# Patient Record
Sex: FEMALE | Race: BLACK OR AFRICAN AMERICAN | NOT HISPANIC OR LATINO
[De-identification: names, ages, dates, MRNs, and addresses within clinical notes are randomized per-mention and may not be internally consistent; named-entity substitution may affect disease eponyms.]

---

## 2017-04-04 ENCOUNTER — MEDICATION RENEWAL (OUTPATIENT)
Age: 62
End: 2017-04-04

## 2017-04-07 ENCOUNTER — EMERGENCY (EMERGENCY)
Facility: HOSPITAL | Age: 62
LOS: 1 days | Discharge: HOME | End: 2017-04-07
Attending: EMERGENCY MEDICINE | Admitting: EMERGENCY MEDICINE
Payer: COMMERCIAL

## 2017-04-07 VITALS
OXYGEN SATURATION: 100 % | DIASTOLIC BLOOD PRESSURE: 86 MMHG | HEART RATE: 100 BPM | SYSTOLIC BLOOD PRESSURE: 136 MMHG | RESPIRATION RATE: 20 BRPM

## 2017-04-07 PROCEDURE — 93010 ELECTROCARDIOGRAM REPORT: CPT | Mod: 59

## 2017-04-07 PROCEDURE — 99284 EMERGENCY DEPT VISIT MOD MDM: CPT | Mod: 25

## 2017-04-07 NOTE — ED PROVIDER NOTE - OBJECTIVE STATEMENT
60yo f pmh asthma, chf, DM, htn, thyroid cancer, presents s/p mechanical fall. Hit head. No LOC. Pt on ASA. Mild dizziness after the fall but able to ambulate. No nausea or vomiting. Pt now complaining of left head and shoulder pain.

## 2017-04-07 NOTE — ED ADULT NURSE NOTE - PMH
Asthma    CHF (congestive heart failure)  hypothyroid  DM (diabetes mellitus)    HTN (hypertension)    Thyroid ca

## 2017-04-07 NOTE — ED PROVIDER NOTE - ATTENDING CONTRIBUTION TO CARE
62 yo F past medical history of asthma, CHF, DM, Hypertension, thyroid cancer, presents s/p mechanical fall after slipping on Lysol on the floor. Hit head. No LOC, no N/V, no visual changes. Pt on daily ASA. Patient able to ambulate after fall. Patient states she struck her Left shoulder and chest wall.  JODEE LUCIANO, ATTENDING NOTE:  Patient is awake and alert and in no acute distress.  Normocephalic/atraumatic, No Romeo's sign, no Raccoon eyes..  Auricles are normal.  Neck supple.  Lungs CTAB, no wheeze, no rhonchi,  no rales.  Heart is regular rate and rhythm.  Abdomen is soft, not distended +BS.  Back is nontender, no CVAT.  Moving all 4 extremities.   Neurologically grossly intact.  Affect is appropriate.

## 2017-04-07 NOTE — ED PROVIDER NOTE - PROGRESS NOTE DETAILS
JODEE BUNCH MD: Patient received Tylenol for pain.  Will reassess for adequate pain control prior to discharge.

## 2017-04-07 NOTE — ED ADULT NURSE NOTE - OBJECTIVE STATEMENT
Terence RN: Patient received to room 18, Aox4 and ambulatory. States she was in her kitchen today when she slipped and fell and hit the left side of her head on the door frame then landed on her left shoulder on the floor. Denies being on anticoagulants. States hx of CHF. States she did not get dizzy. States she feels a little sleepy after hitting her head. Vitally stable, in no acute distress. denies any symptoms other than left sided head 6/10 pain scale pain radiating to her neck and left shoulder pain 6/10. Able to move shoulder. Oriented to person, place and time. Report given to Irma DOWNING.

## 2017-04-07 NOTE — ED PROVIDER NOTE - ENMT, MLM
Airway patent, Nasal mucosa clear. Mouth with normal mucosa. Throat has no vesicles, no oropharyngeal exudates and uvula is midline. Airway patent.  No stridor, no oropharyngeal edema.

## 2017-04-07 NOTE — ED ADULT TRIAGE NOTE - CHIEF COMPLAINT QUOTE
Patient states that she slipped and fell today in her kitchen, 4 :30 pm.  Hit her  head on the left side, denies any loc. Has left neck and shoulder pain, headache.

## 2017-04-08 VITALS
DIASTOLIC BLOOD PRESSURE: 80 MMHG | OXYGEN SATURATION: 98 % | RESPIRATION RATE: 15 BRPM | SYSTOLIC BLOOD PRESSURE: 110 MMHG | HEART RATE: 83 BPM

## 2017-04-08 PROCEDURE — 71020: CPT | Mod: 26

## 2017-04-08 PROCEDURE — 72125 CT NECK SPINE W/O DYE: CPT | Mod: 26

## 2017-04-08 PROCEDURE — 73030 X-RAY EXAM OF SHOULDER: CPT | Mod: 26,LT

## 2017-04-08 PROCEDURE — 70450 CT HEAD/BRAIN W/O DYE: CPT | Mod: 26

## 2017-04-08 RX ORDER — ACETAMINOPHEN 500 MG
650 TABLET ORAL ONCE
Qty: 0 | Refills: 0 | Status: COMPLETED | OUTPATIENT
Start: 2017-04-08 | End: 2017-04-08

## 2017-04-08 RX ADMIN — Medication 650 MILLIGRAM(S): at 01:17

## 2017-05-08 ENCOUNTER — EMERGENCY (EMERGENCY)
Facility: HOSPITAL | Age: 62
LOS: 1 days | Discharge: HOME | End: 2017-05-08
Attending: EMERGENCY MEDICINE | Admitting: EMERGENCY MEDICINE
Payer: COMMERCIAL

## 2017-05-08 VITALS
HEART RATE: 100 BPM | OXYGEN SATURATION: 100 % | TEMPERATURE: 98 F | DIASTOLIC BLOOD PRESSURE: 96 MMHG | SYSTOLIC BLOOD PRESSURE: 134 MMHG | RESPIRATION RATE: 20 BRPM

## 2017-05-08 VITALS
HEART RATE: 89 BPM | RESPIRATION RATE: 18 BRPM | TEMPERATURE: 98 F | HEIGHT: 67 IN | SYSTOLIC BLOOD PRESSURE: 117 MMHG | WEIGHT: 229.94 LBS | OXYGEN SATURATION: 99 % | DIASTOLIC BLOOD PRESSURE: 90 MMHG

## 2017-05-08 PROCEDURE — 99284 EMERGENCY DEPT VISIT MOD MDM: CPT | Mod: 25

## 2017-05-08 PROCEDURE — 93010 ELECTROCARDIOGRAM REPORT: CPT

## 2017-05-08 NOTE — ED ADULT TRIAGE NOTE - CHIEF COMPLAINT QUOTE
Pt c/o abd pain with SOB x1 week.  Hx CHF.  No pedal edema, vomiting, or diarrhea.  Appears comfortable.  Able to complete sentences

## 2017-05-08 NOTE — ED ADULT NURSE NOTE - OBJECTIVE STATEMENT
A&Ox3, respirations even and unlabored, lung sounds clear on auscultation, skin warm and dry good for color, skin intact, speaks in clear and full sentences, moves all extremities, ambulatory at baseline, no lower extremity swelling, + capillary refill, + pedal pulses. As per patient had abdominal pain with SOB x 8 days, states she feels "a bubbling feeling in my stomach and I feel short of breathe", loose stool last BM 8pm today. Denies chest pain, LOC, fevers, chills, dysuria, hematuria. Recent fall was April due to slip and fall and hit head, seen in [facility] ED for fall. HX of thyroidectomy s/p thyroid CA, CHF, borderline DM, cardiac arrest in 12/2013.

## 2017-05-09 LAB
ALBUMIN SERPL ELPH-MCNC: 4.3 G/DL — SIGNIFICANT CHANGE UP (ref 3.3–5)
ALP SERPL-CCNC: 160 U/L — HIGH (ref 40–120)
ALT FLD-CCNC: 118 U/L — HIGH (ref 4–33)
AST SERPL-CCNC: 74 U/L — HIGH (ref 4–32)
BASOPHILS # BLD AUTO: 0.05 K/UL — SIGNIFICANT CHANGE UP (ref 0–0.2)
BASOPHILS NFR BLD AUTO: 0.6 % — SIGNIFICANT CHANGE UP (ref 0–2)
BILIRUB SERPL-MCNC: 0.5 MG/DL — SIGNIFICANT CHANGE UP (ref 0.2–1.2)
BUN SERPL-MCNC: 19 MG/DL — SIGNIFICANT CHANGE UP (ref 7–23)
CALCIUM SERPL-MCNC: 6.6 MG/DL — LOW (ref 8.4–10.5)
CHLORIDE SERPL-SCNC: 95 MMOL/L — LOW (ref 98–107)
CO2 SERPL-SCNC: 27 MMOL/L — SIGNIFICANT CHANGE UP (ref 22–31)
CREAT SERPL-MCNC: 1.18 MG/DL — SIGNIFICANT CHANGE UP (ref 0.5–1.3)
EOSINOPHIL # BLD AUTO: 0.14 K/UL — SIGNIFICANT CHANGE UP (ref 0–0.5)
EOSINOPHIL NFR BLD AUTO: 1.6 % — SIGNIFICANT CHANGE UP (ref 0–6)
GLUCOSE SERPL-MCNC: 200 MG/DL — HIGH (ref 70–99)
HCT VFR BLD CALC: 38.6 % — SIGNIFICANT CHANGE UP (ref 34.5–45)
HGB BLD-MCNC: 12.5 G/DL — SIGNIFICANT CHANGE UP (ref 11.5–15.5)
IMM GRANULOCYTES NFR BLD AUTO: 0.3 % — SIGNIFICANT CHANGE UP (ref 0–1.5)
LYMPHOCYTES # BLD AUTO: 2.59 K/UL — SIGNIFICANT CHANGE UP (ref 1–3.3)
LYMPHOCYTES # BLD AUTO: 29.6 % — SIGNIFICANT CHANGE UP (ref 13–44)
MCHC RBC-ENTMCNC: 25.6 PG — LOW (ref 27–34)
MCHC RBC-ENTMCNC: 32.4 % — SIGNIFICANT CHANGE UP (ref 32–36)
MCV RBC AUTO: 79.1 FL — LOW (ref 80–100)
MONOCYTES # BLD AUTO: 0.62 K/UL — SIGNIFICANT CHANGE UP (ref 0–0.9)
MONOCYTES NFR BLD AUTO: 7.1 % — SIGNIFICANT CHANGE UP (ref 2–14)
NEUTROPHILS # BLD AUTO: 5.33 K/UL — SIGNIFICANT CHANGE UP (ref 1.8–7.4)
NEUTROPHILS NFR BLD AUTO: 60.8 % — SIGNIFICANT CHANGE UP (ref 43–77)
PLATELET # BLD AUTO: 279 K/UL — SIGNIFICANT CHANGE UP (ref 150–400)
PMV BLD: 11.5 FL — SIGNIFICANT CHANGE UP (ref 7–13)
POTASSIUM SERPL-MCNC: 4.3 MMOL/L — SIGNIFICANT CHANGE UP (ref 3.5–5.3)
POTASSIUM SERPL-SCNC: 4.3 MMOL/L — SIGNIFICANT CHANGE UP (ref 3.5–5.3)
PROT SERPL-MCNC: 7.8 G/DL — SIGNIFICANT CHANGE UP (ref 6–8.3)
RBC # BLD: 4.88 M/UL — SIGNIFICANT CHANGE UP (ref 3.8–5.2)
RBC # FLD: 16.3 % — HIGH (ref 10.3–14.5)
SODIUM SERPL-SCNC: 138 MMOL/L — SIGNIFICANT CHANGE UP (ref 135–145)
WBC # BLD: 8.76 K/UL — SIGNIFICANT CHANGE UP (ref 3.8–10.5)
WBC # FLD AUTO: 8.76 K/UL — SIGNIFICANT CHANGE UP (ref 3.8–10.5)

## 2017-05-09 RX ORDER — METOCLOPRAMIDE HCL 10 MG
10 TABLET ORAL ONCE
Qty: 0 | Refills: 0 | Status: COMPLETED | OUTPATIENT
Start: 2017-05-09 | End: 2017-05-09

## 2017-05-09 RX ORDER — METOCLOPRAMIDE HCL 10 MG
1 TABLET ORAL
Qty: 15 | Refills: 0 | OUTPATIENT
Start: 2017-05-09

## 2017-05-09 RX ADMIN — Medication 10 MILLIGRAM(S): at 02:05

## 2017-05-09 RX ADMIN — Medication 20 MILLIGRAM(S): at 00:53

## 2017-05-09 NOTE — ED PROVIDER NOTE - ATTENDING CONTRIBUTION TO CARE
pt presenting with a vague complaint of abdominal "bubbling" that at times radiates into her chest.  When she belches the symptoms resolve.  Has a history of CHF and DM.  Reports decrease in appetite recently as well as nausea.  Abd exam is benign and lungs are clear.  Is not consistent with ADHF or cardiac in etiology.  likely GI related along the lines of a gastroparesis.  Will treat symptomatically while ensuring normal lytes and HgB

## 2017-05-09 NOTE — ED PROVIDER NOTE - OBJECTIVE STATEMENT
60yo f pmh asthma, chf, DM, htn, thyroid cancer, p/w nn, stomach pain, sob x  1.5 weeks; last bm yesterday; no vomiting; has had  in past, no other abd surgeries; + sob when lies flat, + mild pedal edema; takes toresmide 20mg  and 10mg every other day;  no cp/ + lightheadedness intermittently for 1.5 weeks; + exerctional dyspnea, no recent stress test; sugars in 160s-    dr. tan On license of [facility];' 62yo f pmh asthma, chf, DM, htn, thyroid cancer, p/w nn, stomach pain, sob x  1.5 weeks; last bm yesterday; no vomiting; has had  in past, no other abd surgeries; + sob when lies flat, however at baseline, + mild pedal edema; takes toresmide 20mg  and 10mg every other day;  no cp/ + lightheadedness intermittently for 1.5 weeks; + exerctional dyspnea when belly bloating is present, no recent stress test; sugars in 160s-    dr. [facility];'

## 2017-05-13 ENCOUNTER — INPATIENT (INPATIENT)
Facility: HOSPITAL | Age: 62
LOS: 4 days | Discharge: HOME | End: 2017-05-18
Attending: INTERNAL MEDICINE | Admitting: INTERNAL MEDICINE
Payer: COMMERCIAL

## 2017-05-13 VITALS
SYSTOLIC BLOOD PRESSURE: 133 MMHG | DIASTOLIC BLOOD PRESSURE: 85 MMHG | RESPIRATION RATE: 18 BRPM | HEART RATE: 89 BPM | TEMPERATURE: 98 F | OXYGEN SATURATION: 99 %

## 2017-05-13 DIAGNOSIS — I50.22 CHRONIC SYSTOLIC (CONGESTIVE) HEART FAILURE: ICD-10-CM

## 2017-05-13 DIAGNOSIS — R10.84 GENERALIZED ABDOMINAL PAIN: ICD-10-CM

## 2017-05-13 DIAGNOSIS — I10 ESSENTIAL (PRIMARY) HYPERTENSION: ICD-10-CM

## 2017-05-13 DIAGNOSIS — R11.0 NAUSEA: ICD-10-CM

## 2017-05-13 DIAGNOSIS — K80.20 CALCULUS OF GALLBLADDER WITHOUT CHOLECYSTITIS WITHOUT OBSTRUCTION: ICD-10-CM

## 2017-05-13 DIAGNOSIS — R10.11 RIGHT UPPER QUADRANT PAIN: ICD-10-CM

## 2017-05-13 DIAGNOSIS — E11.9 TYPE 2 DIABETES MELLITUS WITHOUT COMPLICATIONS: ICD-10-CM

## 2017-05-13 DIAGNOSIS — E87.1 HYPO-OSMOLALITY AND HYPONATREMIA: ICD-10-CM

## 2017-05-13 LAB
ALBUMIN SERPL ELPH-MCNC: 4 G/DL — SIGNIFICANT CHANGE UP (ref 3.3–5)
ALP SERPL-CCNC: 181 U/L — HIGH (ref 40–120)
ALT FLD-CCNC: 137 U/L — HIGH (ref 4–33)
AST SERPL-CCNC: 124 U/L — HIGH (ref 4–32)
BASOPHILS # BLD AUTO: 0.03 K/UL — SIGNIFICANT CHANGE UP (ref 0–0.2)
BASOPHILS NFR BLD AUTO: 0.3 % — SIGNIFICANT CHANGE UP (ref 0–2)
BILIRUB SERPL-MCNC: 1.3 MG/DL — HIGH (ref 0.2–1.2)
BUN SERPL-MCNC: 17 MG/DL — SIGNIFICANT CHANGE UP (ref 7–23)
CALCIUM SERPL-MCNC: 6.3 MG/DL — CRITICAL LOW (ref 8.4–10.5)
CHLORIDE SERPL-SCNC: 90 MMOL/L — LOW (ref 98–107)
CO2 SERPL-SCNC: 23 MMOL/L — SIGNIFICANT CHANGE UP (ref 22–31)
CREAT SERPL-MCNC: 1.06 MG/DL — SIGNIFICANT CHANGE UP (ref 0.5–1.3)
EOSINOPHIL # BLD AUTO: 0.07 K/UL — SIGNIFICANT CHANGE UP (ref 0–0.5)
EOSINOPHIL NFR BLD AUTO: 0.8 % — SIGNIFICANT CHANGE UP (ref 0–6)
GLUCOSE SERPL-MCNC: 247 MG/DL — HIGH (ref 70–99)
HCT VFR BLD CALC: 37.1 % — SIGNIFICANT CHANGE UP (ref 34.5–45)
HGB BLD-MCNC: 12.2 G/DL — SIGNIFICANT CHANGE UP (ref 11.5–15.5)
IMM GRANULOCYTES NFR BLD AUTO: 0.4 % — SIGNIFICANT CHANGE UP (ref 0–1.5)
LIDOCAIN IGE QN: 34.4 U/L — SIGNIFICANT CHANGE UP (ref 7–60)
LYMPHOCYTES # BLD AUTO: 2.18 K/UL — SIGNIFICANT CHANGE UP (ref 1–3.3)
LYMPHOCYTES # BLD AUTO: 23.9 % — SIGNIFICANT CHANGE UP (ref 13–44)
MCHC RBC-ENTMCNC: 25.6 PG — LOW (ref 27–34)
MCHC RBC-ENTMCNC: 32.9 % — SIGNIFICANT CHANGE UP (ref 32–36)
MCV RBC AUTO: 77.8 FL — LOW (ref 80–100)
MONOCYTES # BLD AUTO: 0.54 K/UL — SIGNIFICANT CHANGE UP (ref 0–0.9)
MONOCYTES NFR BLD AUTO: 5.9 % — SIGNIFICANT CHANGE UP (ref 2–14)
NEUTROPHILS # BLD AUTO: 6.26 K/UL — SIGNIFICANT CHANGE UP (ref 1.8–7.4)
NEUTROPHILS NFR BLD AUTO: 68.7 % — SIGNIFICANT CHANGE UP (ref 43–77)
PLATELET # BLD AUTO: 266 K/UL — SIGNIFICANT CHANGE UP (ref 150–400)
PMV BLD: 11.5 FL — SIGNIFICANT CHANGE UP (ref 7–13)
POTASSIUM SERPL-MCNC: 4.5 MMOL/L — SIGNIFICANT CHANGE UP (ref 3.5–5.3)
POTASSIUM SERPL-SCNC: 4.5 MMOL/L — SIGNIFICANT CHANGE UP (ref 3.5–5.3)
PROT SERPL-MCNC: 7.4 G/DL — SIGNIFICANT CHANGE UP (ref 6–8.3)
RBC # BLD: 4.77 M/UL — SIGNIFICANT CHANGE UP (ref 3.8–5.2)
RBC # FLD: 16.7 % — HIGH (ref 10.3–14.5)
SODIUM SERPL-SCNC: 132 MMOL/L — LOW (ref 135–145)
TROPONIN T SERPL-MCNC: < 0.06 NG/ML — SIGNIFICANT CHANGE UP (ref 0–0.06)
WBC # BLD: 9.12 K/UL — SIGNIFICANT CHANGE UP (ref 3.8–10.5)
WBC # FLD AUTO: 9.12 K/UL — SIGNIFICANT CHANGE UP (ref 3.8–10.5)

## 2017-05-13 PROCEDURE — 78226 HEPATOBILIARY SYSTEM IMAGING: CPT | Mod: 26,GC

## 2017-05-13 PROCEDURE — 78227 HEPATOBIL SYST IMAGE W/DRUG: CPT | Mod: 26,GC

## 2017-05-13 PROCEDURE — 99223 1ST HOSP IP/OBS HIGH 75: CPT

## 2017-05-13 PROCEDURE — 74177 CT ABD & PELVIS W/CONTRAST: CPT | Mod: 26

## 2017-05-13 PROCEDURE — 76705 ECHO EXAM OF ABDOMEN: CPT | Mod: 26

## 2017-05-13 PROCEDURE — 71020: CPT | Mod: 26

## 2017-05-13 RX ORDER — SODIUM CHLORIDE 9 MG/ML
1000 INJECTION INTRAMUSCULAR; INTRAVENOUS; SUBCUTANEOUS ONCE
Qty: 0 | Refills: 0 | Status: COMPLETED | OUTPATIENT
Start: 2017-05-13 | End: 2017-05-13

## 2017-05-13 RX ORDER — CARVEDILOL PHOSPHATE 80 MG/1
3.12 CAPSULE, EXTENDED RELEASE ORAL EVERY 12 HOURS
Qty: 0 | Refills: 0 | Status: DISCONTINUED | OUTPATIENT
Start: 2017-05-13 | End: 2017-05-18

## 2017-05-13 RX ORDER — LISINOPRIL 2.5 MG/1
10 TABLET ORAL DAILY
Qty: 0 | Refills: 0 | Status: DISCONTINUED | OUTPATIENT
Start: 2017-05-13 | End: 2017-05-18

## 2017-05-13 RX ORDER — MORPHINE SULFATE 50 MG/1
4 CAPSULE, EXTENDED RELEASE ORAL ONCE
Qty: 0 | Refills: 0 | Status: DISCONTINUED | OUTPATIENT
Start: 2017-05-13 | End: 2017-05-13

## 2017-05-13 RX ORDER — ERGOCALCIFEROL 1.25 MG/1
50000 CAPSULE ORAL
Qty: 0 | Refills: 0 | Status: DISCONTINUED | OUTPATIENT
Start: 2017-05-13 | End: 2017-05-18

## 2017-05-13 RX ORDER — KETOROLAC TROMETHAMINE 30 MG/ML
15 SYRINGE (ML) INJECTION ONCE
Qty: 0 | Refills: 0 | Status: DISCONTINUED | OUTPATIENT
Start: 2017-05-13 | End: 2017-05-13

## 2017-05-13 RX ORDER — METRONIDAZOLE 500 MG
500 TABLET ORAL ONCE
Qty: 0 | Refills: 0 | Status: COMPLETED | OUTPATIENT
Start: 2017-05-13 | End: 2017-05-13

## 2017-05-13 RX ORDER — LEVOTHYROXINE SODIUM 125 MCG
200 TABLET ORAL DAILY
Qty: 0 | Refills: 0 | Status: DISCONTINUED | OUTPATIENT
Start: 2017-05-13 | End: 2017-05-18

## 2017-05-13 RX ORDER — CALCIUM CARBONATE 500(1250)
3 TABLET ORAL
Qty: 0 | Refills: 0 | Status: DISCONTINUED | OUTPATIENT
Start: 2017-05-13 | End: 2017-05-18

## 2017-05-13 RX ORDER — SIMETHICONE 80 MG/1
80 TABLET, CHEWABLE ORAL ONCE
Qty: 0 | Refills: 0 | Status: COMPLETED | OUTPATIENT
Start: 2017-05-13 | End: 2017-05-13

## 2017-05-13 RX ORDER — METOCLOPRAMIDE HCL 10 MG
10 TABLET ORAL ONCE
Qty: 0 | Refills: 0 | Status: COMPLETED | OUTPATIENT
Start: 2017-05-13 | End: 2017-05-13

## 2017-05-13 RX ORDER — ONDANSETRON 8 MG/1
4 TABLET, FILM COATED ORAL ONCE
Qty: 0 | Refills: 0 | Status: COMPLETED | OUTPATIENT
Start: 2017-05-13 | End: 2017-05-13

## 2017-05-13 RX ORDER — INSULIN LISPRO 100/ML
VIAL (ML) SUBCUTANEOUS AT BEDTIME
Qty: 0 | Refills: 0 | Status: DISCONTINUED | OUTPATIENT
Start: 2017-05-13 | End: 2017-05-18

## 2017-05-13 RX ORDER — MORPHINE SULFATE 50 MG/1
4 CAPSULE, EXTENDED RELEASE ORAL EVERY 4 HOURS
Qty: 0 | Refills: 0 | Status: DISCONTINUED | OUTPATIENT
Start: 2017-05-13 | End: 2017-05-18

## 2017-05-13 RX ORDER — CALCITRIOL 0.5 UG/1
0.5 CAPSULE ORAL DAILY
Qty: 0 | Refills: 0 | Status: DISCONTINUED | OUTPATIENT
Start: 2017-05-13 | End: 2017-05-18

## 2017-05-13 RX ORDER — FAMOTIDINE 10 MG/ML
20 INJECTION INTRAVENOUS ONCE
Qty: 0 | Refills: 0 | Status: COMPLETED | OUTPATIENT
Start: 2017-05-13 | End: 2017-05-13

## 2017-05-13 RX ORDER — INSULIN LISPRO 100/ML
VIAL (ML) SUBCUTANEOUS
Qty: 0 | Refills: 0 | Status: DISCONTINUED | OUTPATIENT
Start: 2017-05-13 | End: 2017-05-18

## 2017-05-13 RX ORDER — CIPROFLOXACIN LACTATE 400MG/40ML
400 VIAL (ML) INTRAVENOUS ONCE
Qty: 0 | Refills: 0 | Status: COMPLETED | OUTPATIENT
Start: 2017-05-13 | End: 2017-05-13

## 2017-05-13 RX ORDER — ASPIRIN/CALCIUM CARB/MAGNESIUM 324 MG
81 TABLET ORAL DAILY
Qty: 0 | Refills: 0 | Status: DISCONTINUED | OUTPATIENT
Start: 2017-05-13 | End: 2017-05-18

## 2017-05-13 RX ORDER — SENNA PLUS 8.6 MG/1
2 TABLET ORAL AT BEDTIME
Qty: 0 | Refills: 0 | Status: DISCONTINUED | OUTPATIENT
Start: 2017-05-13 | End: 2017-05-18

## 2017-05-13 RX ADMIN — FAMOTIDINE 20 MILLIGRAM(S): 10 INJECTION INTRAVENOUS at 17:52

## 2017-05-13 RX ADMIN — Medication 200 MILLIGRAM(S): at 18:11

## 2017-05-13 RX ADMIN — Medication 100 MILLIGRAM(S): at 17:16

## 2017-05-13 RX ADMIN — ONDANSETRON 4 MILLIGRAM(S): 8 TABLET, FILM COATED ORAL at 12:10

## 2017-05-13 RX ADMIN — SODIUM CHLORIDE 1000 MILLILITER(S): 9 INJECTION INTRAMUSCULAR; INTRAVENOUS; SUBCUTANEOUS at 12:09

## 2017-05-13 RX ADMIN — Medication 15 MILLIGRAM(S): at 12:25

## 2017-05-13 RX ADMIN — Medication 15 MILLIGRAM(S): at 12:10

## 2017-05-13 RX ADMIN — Medication 3 TABLET(S): at 23:55

## 2017-05-13 RX ADMIN — Medication 10 MILLIGRAM(S): at 21:07

## 2017-05-13 NOTE — H&P ADULT. - RS GEN PE MLT RESP DETAILS PC
breath sounds equal/airway patent/clear to auscultation bilaterally/good air movement/respirations non-labored

## 2017-05-13 NOTE — H&P ADULT. - PROBLEM SELECTOR PLAN 1
CT abd/pelv showed cholelithiasis, NM hepatobiliary scan was normal w/ no evidence of acute cholecystitis. Supportive care for now, repeat CMP in AM, hold Abx.

## 2017-05-13 NOTE — H&P ADULT. - LAB RESULTS AND INTERPRETATION
Reviewed labs above, remarkable for hyponatremia, hyperglycemia, hypocalcemia, Transaminitis and elevated alk phos, bili

## 2017-05-13 NOTE — ED PROVIDER NOTE - PROGRESS NOTE DETAILS
Pt continues to have RUQ pain. Given morphine w/ some improvement in pain. HIDA scan negative for cholecystitis; will admit to medicine.

## 2017-05-13 NOTE — H&P ADULT. - HISTORY OF PRESENT ILLNESS
Patient is a 60 y/o F PMH DM2, sCHF, HTN, thyroid CA s/p thyroidectomy on synthroid and calcitriol, Cholelithiasis p/w abdominal pain, N/V, and diarrhea. Has been having upper abdominal pain x 2 weeks w/ postprandial worsening with belching and early satiety. Reports pain is constant, throughout the day, worsened with eating, relieved with passing gas. Pain is 8/10, now 0/10 s/p morphine in the ER. Patient was in the ER a few days ago with same complaint and had mildly elevated LFTs. Was discharged on reglan for presumed diabetic gastroparesis but did not fill prescription.

## 2017-05-13 NOTE — H&P ADULT. - PROBLEM SELECTOR PLAN 3
Patient w/ prolonged QTc, will hold off on Reglan for now, if severe nausea, will trial low dose ativan

## 2017-05-13 NOTE — ED ADULT NURSE NOTE - FALLEN IN THE PAST
yes/Pt. states she slipped and fell in her kitchen in April 2017. Pt. states she was evaluated after the fall.

## 2017-05-13 NOTE — ED PROVIDER NOTE - OBJECTIVE STATEMENT
60 y/o F with h/o DM, CHF, HTN, thyroid CA s/p thyroidectomy on synthroid and calcitriol presents with complaint of abdominal pain, nausea/vomiting and diarrhea. Patient has been having upper abdominal pain x 2 weeks worsened with meals and along with belching and early satiety. Reports that she has had constipation for the past 2 weeks and yesterday began to have some loose BMs. Also complaining of chronic SOB secondary to CHF. Patient presented 5 days ago with same complaint and had mildly elevated LFTs. Was discharged on reglan for presumed diabetic gastroparesis but did not fill prescription.

## 2017-05-13 NOTE — H&P ADULT. - PROBLEM SELECTOR PLAN 7
Patient not on any meds at home. Reports fasting AM FS of 150-170s (does not check regularly). HgA1C in AM, DM diet.

## 2017-05-13 NOTE — H&P ADULT. - MUSCULOSKELETAL
negative detailed exam no joint erythema/ROM intact/no calf tenderness/no joint swelling/no joint warmth

## 2017-05-13 NOTE — ED PROVIDER NOTE - ATTENDING CONTRIBUTION TO CARE
Attending note:   After face to face evaluation of this patient, I concur with above noted hx, pe, and care plan for this patient. +DM, +vomiting, mild ruq pain on exam.    Also c/o dark stool but patient on pepto-bismol.  evaluation in progress

## 2017-05-13 NOTE — ED ADULT TRIAGE NOTE - CHIEF COMPLAINT QUOTE
pt c/o n/v/ and diarrhea pt also reports shortness of breath, respirations even and unlabored in triage.

## 2017-05-13 NOTE — H&P ADULT. - GASTROINTESTINAL DETAILS
no masses palpable/bowel sounds normal/no rebound tenderness/nontender/no distention/no rigidity/soft/no organomegaly/no guarding

## 2017-05-13 NOTE — ED ADULT NURSE NOTE - OBJECTIVE STATEMENT
Patient received in room 24. Pt. is A&O x3 and ambulatory at baseline. Pt. c/o Patient received in room 24. Pt. is A&O x3 and ambulatory at baseline. Pt. c/o n/v/d and upper abdominal pain for the past 2 weeks that worsens with meals. Pt. also c/o belching and chronic SOB upon exertion and states "it's because of my CHF." Pt. states she vomited this morning after eating a yogurt and her last BM was today which was loose and dark colored as per pt. 20 gauge IV in right hand. Labs drawn and sent. VS as noted. Daughter at bedside.

## 2017-05-13 NOTE — H&P ADULT. - ASSESSMENT
Patient is a 60 y/o F PMH DM2, sCHF, HTN, thyroid CA s/p thyroidectomy on synthroid and calcitriol, Cholelithiasis p/w abdominal pain, N/V, and diarrhea, FTH cholelithiasis, transaminitis.

## 2017-05-13 NOTE — ED ADULT NURSE REASSESSMENT NOTE - NS ED NURSE REASSESS COMMENT FT1
Spoke to Sonja from Nuclear Medicine who said pt. must remain NPO until tomorrow morning for scan. MD Mcleod made aware. Pt. made aware.

## 2017-05-14 LAB
ALBUMIN SERPL ELPH-MCNC: 4 G/DL — SIGNIFICANT CHANGE UP (ref 3.3–5)
ALP SERPL-CCNC: 192 U/L — HIGH (ref 40–120)
ALT FLD-CCNC: 166 U/L — HIGH (ref 4–33)
AST SERPL-CCNC: 130 U/L — HIGH (ref 4–32)
BASOPHILS # BLD AUTO: 0.03 K/UL — SIGNIFICANT CHANGE UP (ref 0–0.2)
BASOPHILS NFR BLD AUTO: 0.3 % — SIGNIFICANT CHANGE UP (ref 0–2)
BILIRUB SERPL-MCNC: 1.6 MG/DL — HIGH (ref 0.2–1.2)
BUN SERPL-MCNC: 19 MG/DL — SIGNIFICANT CHANGE UP (ref 7–23)
CALCIUM SERPL-MCNC: 6.3 MG/DL — CRITICAL LOW (ref 8.4–10.5)
CHLORIDE SERPL-SCNC: 92 MMOL/L — LOW (ref 98–107)
CO2 SERPL-SCNC: 20 MMOL/L — LOW (ref 22–31)
CREAT SERPL-MCNC: 1.18 MG/DL — SIGNIFICANT CHANGE UP (ref 0.5–1.3)
EOSINOPHIL # BLD AUTO: 0.05 K/UL — SIGNIFICANT CHANGE UP (ref 0–0.5)
EOSINOPHIL NFR BLD AUTO: 0.4 % — SIGNIFICANT CHANGE UP (ref 0–6)
GLUCOSE SERPL-MCNC: 160 MG/DL — HIGH (ref 70–99)
HBA1C BLD-MCNC: 7.6 % — HIGH (ref 4–5.6)
HCT VFR BLD CALC: 37.3 % — SIGNIFICANT CHANGE UP (ref 34.5–45)
HGB BLD-MCNC: 12.1 G/DL — SIGNIFICANT CHANGE UP (ref 11.5–15.5)
IMM GRANULOCYTES NFR BLD AUTO: 0.7 % — SIGNIFICANT CHANGE UP (ref 0–1.5)
LYMPHOCYTES # BLD AUTO: 2.97 K/UL — SIGNIFICANT CHANGE UP (ref 1–3.3)
LYMPHOCYTES # BLD AUTO: 25.7 % — SIGNIFICANT CHANGE UP (ref 13–44)
MAGNESIUM SERPL-MCNC: 1.7 MG/DL — SIGNIFICANT CHANGE UP (ref 1.6–2.6)
MCHC RBC-ENTMCNC: 25.2 PG — LOW (ref 27–34)
MCHC RBC-ENTMCNC: 32.4 % — SIGNIFICANT CHANGE UP (ref 32–36)
MCV RBC AUTO: 77.5 FL — LOW (ref 80–100)
MONOCYTES # BLD AUTO: 0.75 K/UL — SIGNIFICANT CHANGE UP (ref 0–0.9)
MONOCYTES NFR BLD AUTO: 6.5 % — SIGNIFICANT CHANGE UP (ref 2–14)
NEUTROPHILS # BLD AUTO: 7.66 K/UL — HIGH (ref 1.8–7.4)
NEUTROPHILS NFR BLD AUTO: 66.4 % — SIGNIFICANT CHANGE UP (ref 43–77)
PHOSPHATE SERPL-MCNC: 5.9 MG/DL — HIGH (ref 2.5–4.5)
PLATELET # BLD AUTO: 278 K/UL — SIGNIFICANT CHANGE UP (ref 150–400)
PMV BLD: 11.5 FL — SIGNIFICANT CHANGE UP (ref 7–13)
POTASSIUM SERPL-MCNC: 4.3 MMOL/L — SIGNIFICANT CHANGE UP (ref 3.5–5.3)
POTASSIUM SERPL-SCNC: 4.3 MMOL/L — SIGNIFICANT CHANGE UP (ref 3.5–5.3)
PROT SERPL-MCNC: 7.3 G/DL — SIGNIFICANT CHANGE UP (ref 6–8.3)
RBC # BLD: 4.81 M/UL — SIGNIFICANT CHANGE UP (ref 3.8–5.2)
RBC # FLD: 16.6 % — HIGH (ref 10.3–14.5)
SODIUM SERPL-SCNC: 133 MMOL/L — LOW (ref 135–145)
WBC # BLD: 11.54 K/UL — HIGH (ref 3.8–10.5)
WBC # FLD AUTO: 11.54 K/UL — HIGH (ref 3.8–10.5)

## 2017-05-14 PROCEDURE — 99223 1ST HOSP IP/OBS HIGH 75: CPT | Mod: GC

## 2017-05-14 RX ORDER — ENOXAPARIN SODIUM 100 MG/ML
40 INJECTION SUBCUTANEOUS DAILY
Qty: 0 | Refills: 0 | Status: DISCONTINUED | OUTPATIENT
Start: 2017-05-14 | End: 2017-05-17

## 2017-05-14 RX ORDER — SIMETHICONE 80 MG/1
80 TABLET, CHEWABLE ORAL ONCE
Qty: 0 | Refills: 0 | Status: COMPLETED | OUTPATIENT
Start: 2017-05-14 | End: 2017-05-14

## 2017-05-14 RX ADMIN — Medication 2: at 17:25

## 2017-05-14 RX ADMIN — MORPHINE SULFATE 4 MILLIGRAM(S): 50 CAPSULE, EXTENDED RELEASE ORAL at 10:03

## 2017-05-14 RX ADMIN — Medication 0.5 MILLIGRAM(S): at 21:18

## 2017-05-14 RX ADMIN — Medication 2: at 08:40

## 2017-05-14 RX ADMIN — LISINOPRIL 10 MILLIGRAM(S): 2.5 TABLET ORAL at 06:11

## 2017-05-14 RX ADMIN — CARVEDILOL PHOSPHATE 3.12 MILLIGRAM(S): 80 CAPSULE, EXTENDED RELEASE ORAL at 17:26

## 2017-05-14 RX ADMIN — SENNA PLUS 2 TABLET(S): 8.6 TABLET ORAL at 21:18

## 2017-05-14 RX ADMIN — Medication 81 MILLIGRAM(S): at 11:15

## 2017-05-14 RX ADMIN — Medication 3 TABLET(S): at 17:26

## 2017-05-14 RX ADMIN — CALCITRIOL 0.5 MICROGRAM(S): 0.5 CAPSULE ORAL at 11:15

## 2017-05-14 RX ADMIN — MORPHINE SULFATE 4 MILLIGRAM(S): 50 CAPSULE, EXTENDED RELEASE ORAL at 09:48

## 2017-05-14 RX ADMIN — Medication 3 TABLET(S): at 21:18

## 2017-05-14 RX ADMIN — Medication 200 MICROGRAM(S): at 06:11

## 2017-05-14 RX ADMIN — Medication 3 TABLET(S): at 06:11

## 2017-05-14 RX ADMIN — SIMETHICONE 80 MILLIGRAM(S): 80 TABLET, CHEWABLE ORAL at 00:49

## 2017-05-14 RX ADMIN — Medication 0.5 MILLIGRAM(S): at 13:31

## 2017-05-14 RX ADMIN — Medication 10 MILLIGRAM(S): at 06:11

## 2017-05-14 RX ADMIN — CARVEDILOL PHOSPHATE 3.12 MILLIGRAM(S): 80 CAPSULE, EXTENDED RELEASE ORAL at 06:11

## 2017-05-14 RX ADMIN — Medication 3 TABLET(S): at 11:15

## 2017-05-15 LAB
24R-OH-CALCIDIOL SERPL-MCNC: 43.4 NG/ML — SIGNIFICANT CHANGE UP (ref 30–100)
ALBUMIN SERPL ELPH-MCNC: 3.7 G/DL — SIGNIFICANT CHANGE UP (ref 3.3–5)
ALP SERPL-CCNC: 178 U/L — HIGH (ref 40–120)
ALT FLD-CCNC: 168 U/L — HIGH (ref 4–33)
AST SERPL-CCNC: 123 U/L — HIGH (ref 4–32)
BILIRUB DIRECT SERPL-MCNC: 0.5 MG/DL — HIGH (ref 0.1–0.2)
BILIRUB SERPL-MCNC: 1.1 MG/DL — SIGNIFICANT CHANGE UP (ref 0.2–1.2)
BUN SERPL-MCNC: 22 MG/DL — SIGNIFICANT CHANGE UP (ref 7–23)
CALCIUM SERPL-MCNC: 6.3 MG/DL — CRITICAL LOW (ref 8.4–10.5)
CHLORIDE SERPL-SCNC: 93 MMOL/L — LOW (ref 98–107)
CO2 SERPL-SCNC: 23 MMOL/L — SIGNIFICANT CHANGE UP (ref 22–31)
CREAT SERPL-MCNC: 1.24 MG/DL — SIGNIFICANT CHANGE UP (ref 0.5–1.3)
GLUCOSE SERPL-MCNC: 157 MG/DL — HIGH (ref 70–99)
HCT VFR BLD CALC: 36.6 % — SIGNIFICANT CHANGE UP (ref 34.5–45)
HGB BLD-MCNC: 12 G/DL — SIGNIFICANT CHANGE UP (ref 11.5–15.5)
MCHC RBC-ENTMCNC: 25.4 PG — LOW (ref 27–34)
MCHC RBC-ENTMCNC: 32.8 % — SIGNIFICANT CHANGE UP (ref 32–36)
MCV RBC AUTO: 77.5 FL — LOW (ref 80–100)
PLATELET # BLD AUTO: 249 K/UL — SIGNIFICANT CHANGE UP (ref 150–400)
PMV BLD: 10.9 FL — SIGNIFICANT CHANGE UP (ref 7–13)
POTASSIUM SERPL-MCNC: 4.3 MMOL/L — SIGNIFICANT CHANGE UP (ref 3.5–5.3)
POTASSIUM SERPL-SCNC: 4.3 MMOL/L — SIGNIFICANT CHANGE UP (ref 3.5–5.3)
PROT SERPL-MCNC: 7 G/DL — SIGNIFICANT CHANGE UP (ref 6–8.3)
PTH-INTACT SERPL-MCNC: 20.64 PG/ML — SIGNIFICANT CHANGE UP (ref 15–65)
RBC # BLD: 4.72 M/UL — SIGNIFICANT CHANGE UP (ref 3.8–5.2)
RBC # FLD: 16.5 % — HIGH (ref 10.3–14.5)
SODIUM SERPL-SCNC: 134 MMOL/L — LOW (ref 135–145)
T4 FREE SERPL-MCNC: 1.8 NG/DL — SIGNIFICANT CHANGE UP (ref 0.9–1.8)
TSH SERPL-MCNC: 0.49 UIU/ML — SIGNIFICANT CHANGE UP (ref 0.27–4.2)
WBC # BLD: 10.9 K/UL — HIGH (ref 3.8–10.5)
WBC # FLD AUTO: 10.9 K/UL — HIGH (ref 3.8–10.5)

## 2017-05-15 PROCEDURE — 74181 MRI ABDOMEN W/O CONTRAST: CPT | Mod: 26

## 2017-05-15 RX ORDER — INSULIN GLARGINE 100 [IU]/ML
12 INJECTION, SOLUTION SUBCUTANEOUS AT BEDTIME
Qty: 0 | Refills: 0 | Status: DISCONTINUED | OUTPATIENT
Start: 2017-05-15 | End: 2017-05-18

## 2017-05-15 RX ORDER — DEXTROSE 50 % IN WATER 50 %
25 SYRINGE (ML) INTRAVENOUS ONCE
Qty: 0 | Refills: 0 | Status: DISCONTINUED | OUTPATIENT
Start: 2017-05-15 | End: 2017-05-18

## 2017-05-15 RX ORDER — SODIUM CHLORIDE 9 MG/ML
1000 INJECTION, SOLUTION INTRAVENOUS
Qty: 0 | Refills: 0 | Status: DISCONTINUED | OUTPATIENT
Start: 2017-05-15 | End: 2017-05-18

## 2017-05-15 RX ORDER — INSULIN GLARGINE 100 [IU]/ML
6 INJECTION, SOLUTION SUBCUTANEOUS ONCE
Qty: 0 | Refills: 0 | Status: COMPLETED | OUTPATIENT
Start: 2017-05-15 | End: 2017-05-15

## 2017-05-15 RX ORDER — SIMETHICONE 80 MG/1
80 TABLET, CHEWABLE ORAL ONCE
Qty: 0 | Refills: 0 | Status: COMPLETED | OUTPATIENT
Start: 2017-05-15 | End: 2017-05-15

## 2017-05-15 RX ORDER — GLUCAGON INJECTION, SOLUTION 0.5 MG/.1ML
1 INJECTION, SOLUTION SUBCUTANEOUS ONCE
Qty: 0 | Refills: 0 | Status: DISCONTINUED | OUTPATIENT
Start: 2017-05-15 | End: 2017-05-18

## 2017-05-15 RX ORDER — DEXTROSE 50 % IN WATER 50 %
1 SYRINGE (ML) INTRAVENOUS ONCE
Qty: 0 | Refills: 0 | Status: DISCONTINUED | OUTPATIENT
Start: 2017-05-15 | End: 2017-05-18

## 2017-05-15 RX ORDER — DEXTROSE 50 % IN WATER 50 %
12.5 SYRINGE (ML) INTRAVENOUS ONCE
Qty: 0 | Refills: 0 | Status: DISCONTINUED | OUTPATIENT
Start: 2017-05-15 | End: 2017-05-18

## 2017-05-15 RX ADMIN — Medication 3 TABLET(S): at 22:02

## 2017-05-15 RX ADMIN — Medication 81 MILLIGRAM(S): at 11:27

## 2017-05-15 RX ADMIN — CARVEDILOL PHOSPHATE 3.12 MILLIGRAM(S): 80 CAPSULE, EXTENDED RELEASE ORAL at 05:50

## 2017-05-15 RX ADMIN — LISINOPRIL 10 MILLIGRAM(S): 2.5 TABLET ORAL at 05:50

## 2017-05-15 RX ADMIN — SENNA PLUS 2 TABLET(S): 8.6 TABLET ORAL at 22:02

## 2017-05-15 RX ADMIN — CARVEDILOL PHOSPHATE 3.12 MILLIGRAM(S): 80 CAPSULE, EXTENDED RELEASE ORAL at 22:05

## 2017-05-15 RX ADMIN — Medication 200 MICROGRAM(S): at 05:50

## 2017-05-15 RX ADMIN — INSULIN GLARGINE 6 UNIT(S): 100 INJECTION, SOLUTION SUBCUTANEOUS at 22:07

## 2017-05-15 RX ADMIN — Medication 0.5 MILLIGRAM(S): at 22:02

## 2017-05-15 RX ADMIN — Medication 3 TABLET(S): at 11:27

## 2017-05-15 RX ADMIN — Medication 1: at 08:34

## 2017-05-15 RX ADMIN — Medication 2: at 17:25

## 2017-05-15 RX ADMIN — Medication 10 MILLIGRAM(S): at 05:49

## 2017-05-15 RX ADMIN — Medication 3 TABLET(S): at 17:25

## 2017-05-15 RX ADMIN — Medication 3 TABLET(S): at 05:49

## 2017-05-15 RX ADMIN — SIMETHICONE 80 MILLIGRAM(S): 80 TABLET, CHEWABLE ORAL at 19:37

## 2017-05-15 RX ADMIN — CALCITRIOL 0.5 MICROGRAM(S): 0.5 CAPSULE ORAL at 11:27

## 2017-05-16 LAB
BUN SERPL-MCNC: 19 MG/DL — SIGNIFICANT CHANGE UP (ref 7–23)
CA-I BLD-SCNC: 0.73 MMOL/L — CRITICAL LOW (ref 1.03–1.23)
CALCIUM SERPL-MCNC: 6.5 MG/DL — CRITICAL LOW (ref 8.4–10.5)
CHLORIDE SERPL-SCNC: 94 MMOL/L — LOW (ref 98–107)
CO2 SERPL-SCNC: 27 MMOL/L — SIGNIFICANT CHANGE UP (ref 22–31)
CREAT SERPL-MCNC: 1.19 MG/DL — SIGNIFICANT CHANGE UP (ref 0.5–1.3)
GLUCOSE SERPL-MCNC: 112 MG/DL — HIGH (ref 70–99)
HCT VFR BLD CALC: 37.4 % — SIGNIFICANT CHANGE UP (ref 34.5–45)
HGB BLD-MCNC: 12.1 G/DL — SIGNIFICANT CHANGE UP (ref 11.5–15.5)
MCHC RBC-ENTMCNC: 25.4 PG — LOW (ref 27–34)
MCHC RBC-ENTMCNC: 32.4 % — SIGNIFICANT CHANGE UP (ref 32–36)
MCV RBC AUTO: 78.4 FL — LOW (ref 80–100)
PLATELET # BLD AUTO: 264 K/UL — SIGNIFICANT CHANGE UP (ref 150–400)
PMV BLD: 11.4 FL — SIGNIFICANT CHANGE UP (ref 7–13)
POTASSIUM SERPL-MCNC: 3.6 MMOL/L — SIGNIFICANT CHANGE UP (ref 3.5–5.3)
POTASSIUM SERPL-SCNC: 3.6 MMOL/L — SIGNIFICANT CHANGE UP (ref 3.5–5.3)
RBC # BLD: 4.77 M/UL — SIGNIFICANT CHANGE UP (ref 3.8–5.2)
RBC # FLD: 17 % — HIGH (ref 10.3–14.5)
SODIUM SERPL-SCNC: 137 MMOL/L — SIGNIFICANT CHANGE UP (ref 135–145)
WBC # BLD: 7.84 K/UL — SIGNIFICANT CHANGE UP (ref 3.8–10.5)
WBC # FLD AUTO: 7.84 K/UL — SIGNIFICANT CHANGE UP (ref 3.8–10.5)

## 2017-05-16 PROCEDURE — 93010 ELECTROCARDIOGRAM REPORT: CPT

## 2017-05-16 PROCEDURE — 93306 TTE W/DOPPLER COMPLETE: CPT | Mod: 26

## 2017-05-16 RX ORDER — CALCIUM GLUCONATE 100 MG/ML
1 VIAL (ML) INTRAVENOUS DAILY
Qty: 0 | Refills: 0 | Status: DISCONTINUED | OUTPATIENT
Start: 2017-05-16 | End: 2017-05-18

## 2017-05-16 RX ORDER — DOCUSATE SODIUM 100 MG
100 CAPSULE ORAL
Qty: 0 | Refills: 0 | Status: DISCONTINUED | OUTPATIENT
Start: 2017-05-16 | End: 2017-05-18

## 2017-05-16 RX ORDER — SIMETHICONE 80 MG/1
80 TABLET, CHEWABLE ORAL EVERY 6 HOURS
Qty: 0 | Refills: 0 | Status: DISCONTINUED | OUTPATIENT
Start: 2017-05-16 | End: 2017-05-18

## 2017-05-16 RX ADMIN — CALCITRIOL 0.5 MICROGRAM(S): 0.5 CAPSULE ORAL at 15:20

## 2017-05-16 RX ADMIN — Medication 200 GRAM(S): at 17:10

## 2017-05-16 RX ADMIN — Medication 10 MILLIGRAM(S): at 18:40

## 2017-05-16 RX ADMIN — ERGOCALCIFEROL 50000 UNIT(S): 1.25 CAPSULE ORAL at 18:20

## 2017-05-16 RX ADMIN — SENNA PLUS 2 TABLET(S): 8.6 TABLET ORAL at 23:43

## 2017-05-16 RX ADMIN — Medication 3 TABLET(S): at 18:20

## 2017-05-16 RX ADMIN — Medication 3 TABLET(S): at 23:44

## 2017-05-16 RX ADMIN — Medication 81 MILLIGRAM(S): at 15:20

## 2017-05-16 RX ADMIN — Medication 200 MICROGRAM(S): at 14:29

## 2017-05-16 RX ADMIN — SIMETHICONE 80 MILLIGRAM(S): 80 TABLET, CHEWABLE ORAL at 23:44

## 2017-05-16 RX ADMIN — INSULIN GLARGINE 12 UNIT(S): 100 INJECTION, SOLUTION SUBCUTANEOUS at 23:43

## 2017-05-16 RX ADMIN — SIMETHICONE 80 MILLIGRAM(S): 80 TABLET, CHEWABLE ORAL at 18:00

## 2017-05-16 RX ADMIN — Medication 100 MILLIGRAM(S): at 18:21

## 2017-05-16 RX ADMIN — Medication: at 17:51

## 2017-05-16 RX ADMIN — CARVEDILOL PHOSPHATE 3.12 MILLIGRAM(S): 80 CAPSULE, EXTENDED RELEASE ORAL at 18:21

## 2017-05-16 RX ADMIN — Medication 3 TABLET(S): at 15:20

## 2017-05-17 ENCOUNTER — RESULT REVIEW (OUTPATIENT)
Age: 62
End: 2017-05-17

## 2017-05-17 LAB
BASOPHILS # BLD AUTO: 0.04 K/UL — SIGNIFICANT CHANGE UP (ref 0–0.2)
BASOPHILS NFR BLD AUTO: 0.5 % — SIGNIFICANT CHANGE UP (ref 0–2)
BUN SERPL-MCNC: 16 MG/DL — SIGNIFICANT CHANGE UP (ref 7–23)
CA-I BLD-SCNC: 0.79 MMOL/L — CRITICAL LOW (ref 1.03–1.23)
CALCIUM SERPL-MCNC: 7.5 MG/DL — LOW (ref 8.4–10.5)
CHLORIDE SERPL-SCNC: 97 MMOL/L — LOW (ref 98–107)
CO2 SERPL-SCNC: 26 MMOL/L — SIGNIFICANT CHANGE UP (ref 22–31)
CREAT SERPL-MCNC: 1.07 MG/DL — SIGNIFICANT CHANGE UP (ref 0.5–1.3)
EOSINOPHIL # BLD AUTO: 0.09 K/UL — SIGNIFICANT CHANGE UP (ref 0–0.5)
EOSINOPHIL NFR BLD AUTO: 1.2 % — SIGNIFICANT CHANGE UP (ref 0–6)
GLUCOSE SERPL-MCNC: 115 MG/DL — HIGH (ref 70–99)
HCT VFR BLD CALC: 38.7 % — SIGNIFICANT CHANGE UP (ref 34.5–45)
HGB BLD-MCNC: 12.5 G/DL — SIGNIFICANT CHANGE UP (ref 11.5–15.5)
IMM GRANULOCYTES NFR BLD AUTO: 0.3 % — SIGNIFICANT CHANGE UP (ref 0–1.5)
LYMPHOCYTES # BLD AUTO: 2.61 K/UL — SIGNIFICANT CHANGE UP (ref 1–3.3)
LYMPHOCYTES # BLD AUTO: 34.7 % — SIGNIFICANT CHANGE UP (ref 13–44)
MAGNESIUM SERPL-MCNC: 1.6 MG/DL — SIGNIFICANT CHANGE UP (ref 1.6–2.6)
MCHC RBC-ENTMCNC: 25.5 PG — LOW (ref 27–34)
MCHC RBC-ENTMCNC: 32.3 % — SIGNIFICANT CHANGE UP (ref 32–36)
MCV RBC AUTO: 78.8 FL — LOW (ref 80–100)
MONOCYTES # BLD AUTO: 0.58 K/UL — SIGNIFICANT CHANGE UP (ref 0–0.9)
MONOCYTES NFR BLD AUTO: 7.7 % — SIGNIFICANT CHANGE UP (ref 2–14)
NEUTROPHILS # BLD AUTO: 4.19 K/UL — SIGNIFICANT CHANGE UP (ref 1.8–7.4)
NEUTROPHILS NFR BLD AUTO: 55.6 % — SIGNIFICANT CHANGE UP (ref 43–77)
PLATELET # BLD AUTO: 249 K/UL — SIGNIFICANT CHANGE UP (ref 150–400)
PMV BLD: 10.8 FL — SIGNIFICANT CHANGE UP (ref 7–13)
POTASSIUM SERPL-MCNC: 3.7 MMOL/L — SIGNIFICANT CHANGE UP (ref 3.5–5.3)
POTASSIUM SERPL-SCNC: 3.7 MMOL/L — SIGNIFICANT CHANGE UP (ref 3.5–5.3)
RBC # BLD: 4.91 M/UL — SIGNIFICANT CHANGE UP (ref 3.8–5.2)
RBC # FLD: 17.2 % — HIGH (ref 10.3–14.5)
SODIUM SERPL-SCNC: 140 MMOL/L — SIGNIFICANT CHANGE UP (ref 135–145)
WBC # BLD: 7.53 K/UL — SIGNIFICANT CHANGE UP (ref 3.8–10.5)
WBC # FLD AUTO: 7.53 K/UL — SIGNIFICANT CHANGE UP (ref 3.8–10.5)

## 2017-05-17 PROCEDURE — 88312 SPECIAL STAINS GROUP 1: CPT | Mod: 26

## 2017-05-17 PROCEDURE — 88305 TISSUE EXAM BY PATHOLOGIST: CPT | Mod: 26

## 2017-05-17 RX ORDER — SUCRALFATE 1 G
1 TABLET ORAL
Qty: 0 | Refills: 0 | Status: DISCONTINUED | OUTPATIENT
Start: 2017-05-17 | End: 2017-05-18

## 2017-05-17 RX ORDER — HEPARIN SODIUM 5000 [USP'U]/ML
5000 INJECTION INTRAVENOUS; SUBCUTANEOUS EVERY 8 HOURS
Qty: 0 | Refills: 0 | Status: DISCONTINUED | OUTPATIENT
Start: 2017-05-17 | End: 2017-05-18

## 2017-05-17 RX ORDER — PANTOPRAZOLE SODIUM 20 MG/1
40 TABLET, DELAYED RELEASE ORAL
Qty: 0 | Refills: 0 | Status: DISCONTINUED | OUTPATIENT
Start: 2017-05-17 | End: 2017-05-18

## 2017-05-17 RX ADMIN — Medication 3 TABLET(S): at 22:58

## 2017-05-17 RX ADMIN — Medication 3 TABLET(S): at 18:22

## 2017-05-17 RX ADMIN — SIMETHICONE 80 MILLIGRAM(S): 80 TABLET, CHEWABLE ORAL at 18:22

## 2017-05-17 RX ADMIN — SIMETHICONE 80 MILLIGRAM(S): 80 TABLET, CHEWABLE ORAL at 22:58

## 2017-05-17 RX ADMIN — CARVEDILOL PHOSPHATE 3.12 MILLIGRAM(S): 80 CAPSULE, EXTENDED RELEASE ORAL at 18:22

## 2017-05-17 RX ADMIN — SENNA PLUS 2 TABLET(S): 8.6 TABLET ORAL at 22:58

## 2017-05-17 RX ADMIN — Medication 200 GRAM(S): at 13:20

## 2017-05-17 RX ADMIN — Medication 1 GRAM(S): at 18:22

## 2017-05-17 RX ADMIN — INSULIN GLARGINE 12 UNIT(S): 100 INJECTION, SOLUTION SUBCUTANEOUS at 22:58

## 2017-05-17 NOTE — PROGRESS NOTE ADULT - SUBJECTIVE AND OBJECTIVE BOX
Subjective: No anginal symptoms   	  MEDICATIONS:  MEDICATIONS  (STANDING):  aspirin enteric coated 81milliGRAM(s) Oral daily  lisinopril 10milliGRAM(s) Oral daily  calcium carbonate 1250 mG (OsCal) 3Tablet(s) Oral four times a day  carvedilol 3.125milliGRAM(s) Oral every 12 hours  torsemide 10milliGRAM(s) Oral daily  calcitriol   Capsule 0.5MICROGram(s) Oral daily  levothyroxine 200MICROGram(s) Oral daily  ergocalciferol 48085Nlcx(s) Oral <User Schedule>  senna 2Tablet(s) Oral at bedtime  insulin lispro (HumaLOG) corrective regimen sliding scale  SubCutaneous three times a day before meals  insulin lispro (HumaLOG) corrective regimen sliding scale  SubCutaneous at bedtime  enoxaparin Injectable 40milliGRAM(s) SubCutaneous daily  insulin glargine Injectable (LANTUS) 12Unit(s) SubCutaneous at bedtime  dextrose 5%. 1000milliLiter(s) IV Continuous <Continuous>  dextrose 50% Injectable 12.5Gram(s) IV Push once  dextrose 50% Injectable 25Gram(s) IV Push once  dextrose 50% Injectable 25Gram(s) IV Push once  simethicone 80milliGRAM(s) Chew every 6 hours  calcium gluconate IVPB 1Gram(s) IV Intermittent daily  docusate sodium 100milliGRAM(s) Oral two times a day      LABS:	 	                       12.5   7.53  )-----------( 249      ( 17 May 2017 06:05 )             38.7     05-17    140  |  97<L>  |  16  ----------------------------<  115<H>  3.7   |  26  |  1.07    Ca    7.5<L>      17 May 2017 06:05  Mg     1.6     05-17      PHYSICAL EXAM:  T(C): 36.8, Max: 37 (05-16 @ 13:24)  HR: 84 (70 - 84)  BP: 112/79 (112/79 - 119/87)  RR: 18 (17 - 18)  SpO2: 99% (99% - 99%)  Wt(kg): --  I&O's Summary    I & Os for current day (as of 17 May 2017 09:52)  =============================================  IN: 100 ml / OUT: 0 ml / NET: 100 ml        Appearance: Normal	  HEENT:   Normal oral mucosa, PERRL, EOMI	  Lymphatic: No lymphadenopathy , no edema  Cardiovascular: Normal S1 S2, No JVD, No murmurs , Peripheral pulses palpable 2+ bilaterally  Respiratory: Lungs clear to auscultation, normal effort 	  Gastrointestinal:  Soft, Non-tender, + BS	  Skin: No rashes, No ecchymoses, No cyanosis, warm to touch  Musculoskeletal: Normal range of motion, normal strength  Psychiatry:  Mood & affect appropriate    TELEMETRY: 	  NSR no events       DIAGNOSTIC TESTING:  [ ] Echocardiogram:   CONCLUSIONS:  1. Mitral annular calcification, otherwise normal mitral  valve. Moderate mitral regurgitation.  2. Moderately dilated left atrium.  LA volume index = 46  cc/m2.  3. Severe left ventricular enlargement.  4. Severe global left ventricular systolic dysfunction.  Endocardial visualization enhanced with intravenous  injection of echo contrast (Definity).  5. Normal right ventricular size with decreased right  ventricular systolic function.  6. Estimated right ventricular systolic pressure equals 53  mm Hg, assuming right atrial pressure equals 10 mm Hg,  consistent with moderate pulmonary hypertension.  *** Compared with echocardiogram of 5/22/2014, no  significant changes noted.  -------------------------------------------	      ASSESSMENT/PLAN: 	61y Female with HTN HLD DM thyroid cancer s/p thyroidectomy known RBBB with a known history of NICM with normal coronary arteries on cardiac cath 2014 admitted with complaints of abdominal pain with evidence of cholelithiasis on MRCP but no evidence of acute cholecystitis with hypocalcemia of unclear origin with no evidence of anginal symptoms or evidence of decompensated CHF :     1) Repeat TTE revealed unchanged severe global LV dysfunction with an EF of 22%-  We  have discussed the above results with her primary cardiologist Dr. Jacobo Davidson. He agrees there is no need for a repeat ischemic evaluation at this time and does agree that the patient should be offered a BI-V ICD for prevention of sudden cardiac death. The patient is considering an ICD. Ep follow up with Dr. Hallman is noted.   2) Would continue with her Coreg and Lisinopril for NICM.   3) She is optimized from a cardiac perspective for EGD as she has no anginal symptoms and no evidence of decompensated CHF.

## 2017-05-17 NOTE — PROGRESS NOTE ADULT - ASSESSMENT
Patient is a 60 y/o F PMH DM2, sCHF, HTN, thyroid CA s/p thyroidectomy on synthroid and calcitriol, Cholelithiasis p/w abdominal pain, N/V, and diarrhea, FTH cholelithiasis, transaminitis.    Problem/Plan - 1:  ·  Abd Pain Calculus of gallbladder without cholecystitis without obstruction. S/P US,CT Scan,MRI and for EGD today.     Problem/Plan - 2:  ·  Severe Non Ischemic Cardiomyopathy. EP following and possible AICD on Friday.     Problem/Plan - 3:  ·  Hypocalcemia : repleting.     Problem/Plan - 4:   Hyponatremia.  Corrected.     Problem/Plan - 5:  Chronic systolic congestive heart failure.  Plan:Plan: C/w coreg, lisinopril, ASA.     Problem/Plan - 6:   Essential hypertension. Plan:Plan: Coreg, lisinopril as above.    Problem/Plan - 7:   Diabetes mellitus type 2 in obese.  Endo helping and sugars better.       Discharge Planning:  ·  Tentative Discharge Location: home.

## 2017-05-17 NOTE — PROGRESS NOTE ADULT - SUBJECTIVE AND OBJECTIVE BOX
INTERVAL HPI/OVERNIGHT EVENTS: Upset that EGD is happening late. Feel much better after two BMs.   Vital Signs Last 24 Hrs  T(C): 36.8, Max: 36.8 (05-16 @ 23:05)  T(F): 98.3, Max: 98.3 (05-16 @ 23:05)  HR: 84 (84 - 84)  BP: 112/79 (112/79 - 112/79)  BP(mean): --  RR: 18 (18 - 18)  SpO2: 99% (99% - 99%)  I&O's Summary    I & Os for current day (as of 17 May 2017 16:05)  =============================================  IN: 100 ml / OUT: 0 ml / NET: 100 ml    MEDICATIONS  (STANDING):  aspirin enteric coated 81milliGRAM(s) Oral daily  lisinopril 10milliGRAM(s) Oral daily  calcium carbonate 1250 mG (OsCal) 3Tablet(s) Oral four times a day  carvedilol 3.125milliGRAM(s) Oral every 12 hours  torsemide 10milliGRAM(s) Oral daily  calcitriol   Capsule 0.5MICROGram(s) Oral daily  levothyroxine 200MICROGram(s) Oral daily  ergocalciferol 70863Auro(s) Oral <User Schedule>  senna 2Tablet(s) Oral at bedtime  insulin lispro (HumaLOG) corrective regimen sliding scale  SubCutaneous three times a day before meals  insulin lispro (HumaLOG) corrective regimen sliding scale  SubCutaneous at bedtime  enoxaparin Injectable 40milliGRAM(s) SubCutaneous daily  insulin glargine Injectable (LANTUS) 12Unit(s) SubCutaneous at bedtime  dextrose 5%. 1000milliLiter(s) IV Continuous <Continuous>  dextrose 50% Injectable 12.5Gram(s) IV Push once  dextrose 50% Injectable 25Gram(s) IV Push once  dextrose 50% Injectable 25Gram(s) IV Push once  simethicone 80milliGRAM(s) Chew every 6 hours  calcium gluconate IVPB 1Gram(s) IV Intermittent daily  docusate sodium 100milliGRAM(s) Oral two times a day  pantoprazole    Tablet 40milliGRAM(s) Oral before breakfast  sucralfate 1Gram(s) Oral two times a day    MEDICATIONS  (PRN):  morphine  - Injectable 4milliGRAM(s) IV Push every 4 hours PRN mod-severe pain  dextrose Gel 1Dose(s) Oral once PRN Blood Glucose LESS THAN 70 milliGRAM(s)/deciLiter  glucagon  Injectable 1milliGRAM(s) IntraMuscular once PRN Glucose <70 milliGRAM(s)/deciLiter    LABS:                        12.5   7.53  )-----------( 249      ( 17 May 2017 06:05 )             38.7     05-17    140  |  97<L>  |  16  ----------------------------<  115<H>  3.7   |  26  |  1.07    Ca    7.5<L>      17 May 2017 06:05  Mg     1.6     05-17          CAPILLARY BLOOD GLUCOSE  121 (17 May 2017 11:47)  126 (17 May 2017 08:32)  167 (16 May 2017 22:32)  203 (16 May 2017 17:26)          REVIEW OF SYSTEMS:  CONSTITUTIONAL: No fever, weight loss, or fatigue  EYES: No eye pain, visual disturbances, or discharge  ENMT:  No difficulty hearing, tinnitus, vertigo; No sinus or throat pain  NECK: No pain or stiffness  BREASTS: No pain, masses, or nipple discharge  RESPIRATORY: No cough, wheezing, chills or hemoptysis; No shortness of breath  CARDIOVASCULAR: No chest pain, palpitations, dizziness, or leg swelling  GASTROINTESTINAL: No abdominal or epigastric pain. No nausea, vomiting, or hematemesis; No diarrhea or constipation. No melena or hematochezia.  GENITOURINARY: No dysuria, frequency, hematuria, or incontinence  NEUROLOGICAL: No headaches, memory loss, loss of strength, numbness, or tremors  SKIN: No itching, burning, rashes, or lesions   LYMPH NODES: No enlarged glands  ENDOCRINE: No heat or cold intolerance; No hair loss  MUSCULOSKELETAL: No joint pain or swelling; No muscle, back, or extremity pain  PSYCHIATRIC: No depression, anxiety, mood swings, or difficulty sleeping  HEME/LYMPH: No easy bruising, or bleeding gums  ALLERY AND IMMUNOLOGIC: No hives or eczema    RADIOLOGY & ADDITIONAL TESTS:    Imaging Personally Reviewed:  [ ] YES  [ ] NO    Consultant(s) Notes Reviewed:  [x ] YES  [ ] NO    PHYSICAL EXAM:  GENERAL: NAD, well-groomed, well-developed  HEAD:  Atraumatic, Normocephalic  EYES: EOMI, PERRLA, conjunctiva and sclera clear  ENMT: No tonsillar erythema, exudates, or enlargement; Moist mucous membranes, Good dentition, No lesions  NECK: Supple, No JVD, Normal thyroid  NERVOUS SYSTEM:  Alert & Oriented X3, Good concentration; Motor Strength 5/5 B/L upper and lower extremities; DTRs 2+ intact and symmetric  CHEST/LUNG: Clear to percussion bilaterally; No rales, rhonchi, wheezing, or rubs  HEART: Regular rate and rhythm; No murmurs, rubs, or gallops  ABDOMEN: Soft, Nontender, Nondistended; Bowel sounds present  EXTREMITIES:  2+ Peripheral Pulses, No clubbing, cyanosis,  edema+  LYMPH: No lymphadenopathy noted  SKIN: No rashes or lesions    Care Discussed with Consultants/Other Providers [ x] YES  [ ] NO

## 2017-05-17 NOTE — PROGRESS NOTE ADULT - SUBJECTIVE AND OBJECTIVE BOX
Telemetry: SR with bifasicular block, NSVT    Subjective: no palpitations, no syncope, no angina  	  MEDICATIONS  (STANDING):  aspirin enteric coated 81milliGRAM(s) Oral daily  lisinopril 10milliGRAM(s) Oral daily  calcium carbonate 1250 mG (OsCal) 3Tablet(s) Oral four times a day  carvedilol 3.125milliGRAM(s) Oral every 12 hours  torsemide 10milliGRAM(s) Oral daily  calcitriol   Capsule 0.5MICROGram(s) Oral daily  levothyroxine 200MICROGram(s) Oral daily  ergocalciferol 06097Gxrf(s) Oral <User Schedule>  senna 2Tablet(s) Oral at bedtime  insulin lispro (HumaLOG) corrective regimen sliding scale  SubCutaneous three times a day before meals  insulin lispro (HumaLOG) corrective regimen sliding scale  SubCutaneous at bedtime  enoxaparin Injectable 40milliGRAM(s) SubCutaneous daily  insulin glargine Injectable (LANTUS) 12Unit(s) SubCutaneous at bedtime  dextrose 5%. 1000milliLiter(s) IV Continuous <Continuous>  dextrose 50% Injectable 12.5Gram(s) IV Push once  dextrose 50% Injectable 25Gram(s) IV Push once  dextrose 50% Injectable 25Gram(s) IV Push once  simethicone 80milliGRAM(s) Chew every 6 hours  calcium gluconate IVPB 1Gram(s) IV Intermittent daily  docusate sodium 100milliGRAM(s) Oral two times a day      LABS:	 	                        12.5   7.53  )-----------( 249      ( 17 May 2017 06:05 )             38.7     05-17    140  |  97<L>  |  16  ----------------------------<  115<H>  3.7   |  26  |  1.07    Ca    7.5<L>      17 May 2017 06:05  Mg     1.6     05-17          PHYSICAL EXAM:  T(C): 36.8, Max: 37 (05-16 @ 13:24)  HR: 84 (70 - 84)  BP: 112/79 (112/79 - 119/87)  RR: 18 (17 - 18)  SpO2: 99% (99% - 99%)  Wt(kg): --    Appearance: Normal	  HEENT:   Normal oral mucosa, PERRL, EOMI	  Lymphatic: No lymphadenopathy , no edema  Cardiovascular: Normal S1 S2, No JVD, No murmurs , Peripheral pulses palpable 2+ bilaterally  Respiratory: Lungs clear to auscultation, normal effort 	  Gastrointestinal:  Soft, Non-tender, + BS	  Skin: No rashes, No ecchymoses, No cyanosis, warm to touch  Psychiatry:  Mood & affect appropriate      ECHO: LVEF 22%    	  ASSESSMENT/PLAN: 	62 y/o female with a long standing history of a dilated non-ischemic cardiomyopathy. Despite greater than 9 months of optimal medical therapy she continues to have NYHA Class III CHF and a persistently reduced LVEF (22%). Previous cath was normal about 2 years ago. In addition her EKG shows bifascicular block with QRS > 120 ms.     -given the above I recommended BiV-ICD implantation for the primary prevention of sudden cardiac death. I cited a 3% risk of bleeding or infection, and a 1% risk of major complication including but not limited to heart attack, stroke, death or need for emergency open heart surgery. Understanding her R/B/A she elects to discuss my recommendation further with her /family. She fully understands the risks without an ICD - including preventible sudden cardiac death. I provided my contact information and informative literature. Awaiting patient/family decision regarding ICD implantation.  If she elects ICD implantation I will schedule surgery for Friday      Dariana Hallman M.D., [facility]  [phone]

## 2017-05-17 NOTE — PROGRESS NOTE ADULT - SUBJECTIVE AND OBJECTIVE BOX
INTERVAL HPI/OVERNIGHT EVENTS:    MEDICATIONS  (STANDING):  aspirin enteric coated 81milliGRAM(s) Oral daily  lisinopril 10milliGRAM(s) Oral daily  calcium carbonate 1250 mG (OsCal) 3Tablet(s) Oral four times a day  carvedilol 3.125milliGRAM(s) Oral every 12 hours  torsemide 10milliGRAM(s) Oral daily  calcitriol   Capsule 0.5MICROGram(s) Oral daily  levothyroxine 200MICROGram(s) Oral daily  ergocalciferol 56658Uvid(s) Oral <User Schedule>  senna 2Tablet(s) Oral at bedtime  insulin lispro (HumaLOG) corrective regimen sliding scale  SubCutaneous three times a day before meals  insulin lispro (HumaLOG) corrective regimen sliding scale  SubCutaneous at bedtime  enoxaparin Injectable 40milliGRAM(s) SubCutaneous daily  insulin glargine Injectable (LANTUS) 12Unit(s) SubCutaneous at bedtime  dextrose 5%. 1000milliLiter(s) IV Continuous <Continuous>  dextrose 50% Injectable 12.5Gram(s) IV Push once  dextrose 50% Injectable 25Gram(s) IV Push once  dextrose 50% Injectable 25Gram(s) IV Push once  simethicone 80milliGRAM(s) Chew every 6 hours  calcium gluconate IVPB 1Gram(s) IV Intermittent daily  docusate sodium 100milliGRAM(s) Oral two times a day    MEDICATIONS  (PRN):  morphine  - Injectable 4milliGRAM(s) IV Push every 4 hours PRN mod-severe pain  dextrose Gel 1Dose(s) Oral once PRN Blood Glucose LESS THAN 70 milliGRAM(s)/deciLiter  glucagon  Injectable 1milliGRAM(s) IntraMuscular once PRN Glucose <70 milliGRAM(s)/deciLiter      Allergies    penicillin (Rash)    Intolerances        ROS:   General:  No wt loss, fevers, chills, night sweats, fatigue,   Eyes:  Good vision, no reported pain  ENT:  No sore throat, pain, runny nose, dysphagia  CV:  No pain, palpitations, hypo/hypertension  Resp:  No dyspnea, cough, tachypnea, wheezing  GI:  No pain, No nausea, No vomiting, No diarrhea, No constipation, No weight loss, No fever, No pruritis, No rectal bleeding, No tarry stools, No dysphagia,  :  No pain, bleeding, incontinence, nocturia  Muscle:  No pain, weakness  Neuro:  No weakness, tingling, memory problems  Psych:  No fatigue, insomnia, mood problems, depression  Endocrine:  No polyuria, polydipsia, cold/heat intolerance  Heme:  No petechiae, ecchymosis, easy bruisability  Skin:  No rash, tattoos, scars, edema      PHYSICAL EXAM:   Vital Signs:  Vital Signs Last 24 Hrs  T(C): 36.8, Max: 36.8 ( @ 23:05)  T(F): 98.3, Max: 98.3 ( @ 23:05)  HR: 84 (84 - 84)  BP: 112/79 (112/79 - 112/79)  BP(mean): --  RR: 18 (18 - 18)  SpO2: 99% (99% - 99%)  Daily     Daily Weight in k (17 May 2017 07:30)    GENERAL:  Appears stated age, well-groomed, well-nourished, no distress  HEENT:  NC/AT,  conjunctivae clear and pink, no thyromegaly, nodules, adenopathy, no JVD, sclera -anicteric  CHEST:  Full & symmetric excursion, no increased effort, breath sounds clear  HEART:  Regular rhythm, S1, S2, no murmur/rub/S3/S4, no abdominal bruit, no edema  ABDOMEN:  Soft, non-tender, non-distended, normoactive bowel sounds,  no masses ,no hepato-splenomegaly, no signs of chronic liver disease  EXTEREMITIES:  no cyanosis,clubbing or edema  SKIN:  No rash/erythema/ecchymoses/petechiae/wounds/abscess/warm/dry  NEURO:  Alert, oriented, no asterixis, no tremor, no encephalopathy      LABS:                        12.5   7.53  )-----------( 249      ( 17 May 2017 06:05 )             38.7         140  |  97<L>  |  16  ----------------------------<  115<H>  3.7   |  26  |  1.07    Ca    7.5<L>      17 May 2017 06:05  Mg     1.6                 RADIOLOGY & ADDITIONAL TESTS:

## 2017-05-17 NOTE — PROGRESS NOTE ADULT - ASSESSMENT
Abdominal pain with increased LFTs and evidence of cholelithiasis on MRCP but no evidence of acute cholecystitis or choledocholithiasis  -am labs   -avoid hepatotoxins  -ppi qd  -carafate bid  -hepatitis panel P  -consider repeat US Abd    -simethicone q6h  -surgery appreciated

## 2017-05-18 ENCOUNTER — TRANSCRIPTION ENCOUNTER (OUTPATIENT)
Age: 62
End: 2017-05-18

## 2017-05-18 VITALS — WEIGHT: 239.42 LBS

## 2017-05-18 DIAGNOSIS — R10.9 UNSPECIFIED ABDOMINAL PAIN: ICD-10-CM

## 2017-05-18 DIAGNOSIS — I42.9 CARDIOMYOPATHY, UNSPECIFIED: ICD-10-CM

## 2017-05-18 LAB
ALBUMIN SERPL ELPH-MCNC: 3.7 G/DL — SIGNIFICANT CHANGE UP (ref 3.3–5)
ALP SERPL-CCNC: 204 U/L — HIGH (ref 40–120)
ALT FLD-CCNC: 150 U/L — HIGH (ref 4–33)
AST SERPL-CCNC: 85 U/L — HIGH (ref 4–32)
BASOPHILS # BLD AUTO: 0.05 K/UL — SIGNIFICANT CHANGE UP (ref 0–0.2)
BASOPHILS NFR BLD AUTO: 0.6 % — SIGNIFICANT CHANGE UP (ref 0–2)
BILIRUB DIRECT SERPL-MCNC: 0.3 MG/DL — HIGH (ref 0.1–0.2)
BILIRUB SERPL-MCNC: 0.9 MG/DL — SIGNIFICANT CHANGE UP (ref 0.2–1.2)
BUN SERPL-MCNC: 16 MG/DL — SIGNIFICANT CHANGE UP (ref 7–23)
CA-I BLD-SCNC: 0.91 MMOL/L — LOW (ref 1.03–1.23)
CALCIUM SERPL-MCNC: 7.6 MG/DL — LOW (ref 8.4–10.5)
CHLORIDE SERPL-SCNC: 96 MMOL/L — LOW (ref 98–107)
CO2 SERPL-SCNC: 32 MMOL/L — HIGH (ref 22–31)
CREAT SERPL-MCNC: 1.13 MG/DL — SIGNIFICANT CHANGE UP (ref 0.5–1.3)
EOSINOPHIL # BLD AUTO: 0.11 K/UL — SIGNIFICANT CHANGE UP (ref 0–0.5)
EOSINOPHIL NFR BLD AUTO: 1.4 % — SIGNIFICANT CHANGE UP (ref 0–6)
GLUCOSE SERPL-MCNC: 196 MG/DL — HIGH (ref 70–99)
HAV IGM SER-ACNC: NONREACTIVE — SIGNIFICANT CHANGE UP
HBV CORE IGM SER-ACNC: NONREACTIVE — SIGNIFICANT CHANGE UP
HBV SURFACE AG SER-ACNC: NONREACTIVE — SIGNIFICANT CHANGE UP
HCT VFR BLD CALC: 38.4 % — SIGNIFICANT CHANGE UP (ref 34.5–45)
HCV AB S/CO SERPL IA: 0.11 S/CO — SIGNIFICANT CHANGE UP
HCV AB SERPL-IMP: SIGNIFICANT CHANGE UP
HGB BLD-MCNC: 12.4 G/DL — SIGNIFICANT CHANGE UP (ref 11.5–15.5)
IMM GRANULOCYTES NFR BLD AUTO: 0.5 % — SIGNIFICANT CHANGE UP (ref 0–1.5)
LYMPHOCYTES # BLD AUTO: 2.26 K/UL — SIGNIFICANT CHANGE UP (ref 1–3.3)
LYMPHOCYTES # BLD AUTO: 29 % — SIGNIFICANT CHANGE UP (ref 13–44)
MAGNESIUM SERPL-MCNC: 1.6 MG/DL — SIGNIFICANT CHANGE UP (ref 1.6–2.6)
MCHC RBC-ENTMCNC: 25.4 PG — LOW (ref 27–34)
MCHC RBC-ENTMCNC: 32.3 % — SIGNIFICANT CHANGE UP (ref 32–36)
MCV RBC AUTO: 78.7 FL — LOW (ref 80–100)
MONOCYTES # BLD AUTO: 0.6 K/UL — SIGNIFICANT CHANGE UP (ref 0–0.9)
MONOCYTES NFR BLD AUTO: 7.7 % — SIGNIFICANT CHANGE UP (ref 2–14)
NEUTROPHILS # BLD AUTO: 4.73 K/UL — SIGNIFICANT CHANGE UP (ref 1.8–7.4)
NEUTROPHILS NFR BLD AUTO: 60.8 % — SIGNIFICANT CHANGE UP (ref 43–77)
PLATELET # BLD AUTO: 275 K/UL — SIGNIFICANT CHANGE UP (ref 150–400)
PMV BLD: 10.6 FL — SIGNIFICANT CHANGE UP (ref 7–13)
POTASSIUM SERPL-MCNC: 3.9 MMOL/L — SIGNIFICANT CHANGE UP (ref 3.5–5.3)
POTASSIUM SERPL-SCNC: 3.9 MMOL/L — SIGNIFICANT CHANGE UP (ref 3.5–5.3)
PROT SERPL-MCNC: 7 G/DL — SIGNIFICANT CHANGE UP (ref 6–8.3)
RBC # BLD: 4.88 M/UL — SIGNIFICANT CHANGE UP (ref 3.8–5.2)
RBC # FLD: 17.2 % — HIGH (ref 10.3–14.5)
SODIUM SERPL-SCNC: 141 MMOL/L — SIGNIFICANT CHANGE UP (ref 135–145)
SURGICAL PATHOLOGY STUDY: SIGNIFICANT CHANGE UP
WBC # BLD: 7.79 K/UL — SIGNIFICANT CHANGE UP (ref 3.8–10.5)
WBC # FLD AUTO: 7.79 K/UL — SIGNIFICANT CHANGE UP (ref 3.8–10.5)

## 2017-05-18 RX ORDER — CALCITRIOL 0.5 UG/1
1 CAPSULE ORAL
Qty: 0 | Refills: 0 | COMMUNITY
Start: 2017-05-18

## 2017-05-18 RX ORDER — LISINOPRIL 2.5 MG/1
1 TABLET ORAL
Qty: 0 | Refills: 0 | COMMUNITY

## 2017-05-18 RX ORDER — CALCIUM CARBONATE 500(1250)
3 TABLET ORAL
Qty: 0 | Refills: 0 | COMMUNITY
Start: 2017-05-18

## 2017-05-18 RX ORDER — POTASSIUM CHLORIDE 20 MEQ
10 PACKET (EA) ORAL ONCE
Qty: 0 | Refills: 0 | Status: COMPLETED | OUTPATIENT
Start: 2017-05-18 | End: 2017-05-18

## 2017-05-18 RX ORDER — CALCIUM GLUCONATE 100 MG/ML
1 VIAL (ML) INTRAVENOUS ONCE
Qty: 0 | Refills: 0 | Status: COMPLETED | OUTPATIENT
Start: 2017-05-18 | End: 2017-05-18

## 2017-05-18 RX ORDER — CALCITRIOL 0.5 UG/1
1 CAPSULE ORAL
Qty: 0 | Refills: 0 | COMMUNITY

## 2017-05-18 RX ORDER — CARVEDILOL PHOSPHATE 80 MG/1
1 CAPSULE, EXTENDED RELEASE ORAL
Qty: 0 | Refills: 0 | COMMUNITY
Start: 2017-05-18

## 2017-05-18 RX ORDER — LEVOTHYROXINE SODIUM 125 MCG
1 TABLET ORAL
Qty: 0 | Refills: 0 | COMMUNITY
Start: 2017-05-18

## 2017-05-18 RX ORDER — SUCRALFATE 1 G
1 TABLET ORAL
Qty: 60 | Refills: 0 | OUTPATIENT
Start: 2017-05-18 | End: 2017-06-17

## 2017-05-18 RX ORDER — LISINOPRIL 2.5 MG/1
1 TABLET ORAL
Qty: 0 | Refills: 0 | COMMUNITY
Start: 2017-05-18

## 2017-05-18 RX ORDER — ASPIRIN/CALCIUM CARB/MAGNESIUM 324 MG
1 TABLET ORAL
Qty: 0 | Refills: 0 | COMMUNITY
Start: 2017-05-18

## 2017-05-18 RX ADMIN — Medication 200 GRAM(S): at 13:55

## 2017-05-18 RX ADMIN — CARVEDILOL PHOSPHATE 3.12 MILLIGRAM(S): 80 CAPSULE, EXTENDED RELEASE ORAL at 05:16

## 2017-05-18 RX ADMIN — Medication 1: at 08:51

## 2017-05-18 RX ADMIN — Medication 200 MICROGRAM(S): at 05:16

## 2017-05-18 RX ADMIN — Medication 3 TABLET(S): at 11:33

## 2017-05-18 RX ADMIN — Medication 10 MILLIGRAM(S): at 05:15

## 2017-05-18 RX ADMIN — Medication 81 MILLIGRAM(S): at 11:33

## 2017-05-18 RX ADMIN — SIMETHICONE 80 MILLIGRAM(S): 80 TABLET, CHEWABLE ORAL at 05:16

## 2017-05-18 RX ADMIN — CALCITRIOL 0.5 MICROGRAM(S): 0.5 CAPSULE ORAL at 11:33

## 2017-05-18 RX ADMIN — LISINOPRIL 10 MILLIGRAM(S): 2.5 TABLET ORAL at 05:15

## 2017-05-18 RX ADMIN — PANTOPRAZOLE SODIUM 40 MILLIGRAM(S): 20 TABLET, DELAYED RELEASE ORAL at 05:15

## 2017-05-18 RX ADMIN — Medication 10 MILLIEQUIVALENT(S): at 11:34

## 2017-05-18 RX ADMIN — Medication 1: at 11:34

## 2017-05-18 RX ADMIN — Medication 3 TABLET(S): at 05:16

## 2017-05-18 RX ADMIN — SIMETHICONE 80 MILLIGRAM(S): 80 TABLET, CHEWABLE ORAL at 11:33

## 2017-05-18 RX ADMIN — Medication 1 GRAM(S): at 05:15

## 2017-05-18 RX ADMIN — Medication 200 GRAM(S): at 11:34

## 2017-05-18 NOTE — DISCHARGE NOTE ADULT - MEDICATION SUMMARY - MEDICATIONS TO STOP TAKING
I will STOP taking the medications listed below when I get home from the hospital:    metoclopramide 10 mg oral tablet, disintegrating  -- 1 tab(s) by mouth every 6 hours, As Needed - for nausea  -- Check with your doctor before becoming pregnant.  Do not drink alcoholic beverages when taking this medication.  May cause drowsiness or dizziness.  Obtain medical advice before taking any non-prescription drugs as some may affect the action of this medication.  Take medication on an empty stomach 1 hour before or 2 to 3 hours after a meal unless otherwise directed by your doctor.  This drug may impair the ability to drive or operate machinery.  Use care until you become familiar with its effects.

## 2017-05-18 NOTE — DISCHARGE NOTE ADULT - MEDICATION SUMMARY - MEDICATIONS TO TAKE
I will START or STAY ON the medications listed below when I get home from the hospital:    aspirin 81 mg oral delayed release tablet  -- 1 tab(s) by mouth once a day  -- Indication: For Cardioprotective     lisinopril 10 mg oral tablet  -- 1 tab(s) by mouth once a day  -- Indication: For Hypertension    calcium carbonate 1250 mg (500 mg elemental calcium) oral tablet  -- 3 tab(s) by mouth 4 times a day  -- Indication: For Calcium supplement     Janumet 50 mg-1000 mg oral tablet  -- 1 tab(s) by mouth 2 times a day  -- Indication: For Diabetes mellitus type 2 in obese    carvedilol 3.125 mg oral tablet  -- 1 tab(s) by mouth every 12 hours  -- Indication: For Hypertension, chronic systolic heart failure     torsemide 10 mg oral tablet  -- 1 tab(s) by mouth once a day  -- Indication: For Chronic systolic congestive heart failure    levothyroxine 200 mcg (0.2 mg) oral tablet  -- 1 tab(s) by mouth once a day  -- Indication: For Hypothyroidism     calcitriol 0.5 mcg oral capsule  -- 1 cap(s) by mouth once a day  -- Indication: For Vitamin D    ergocalciferol  -- 27873 unit(s) by mouth every 7 days; take on Tuesdays at 6pm  -- Indication: For Vitamin D I will START or STAY ON the medications listed below when I get home from the hospital:    aspirin 81 mg oral delayed release tablet  -- 1 tab(s) by mouth once a day  -- Indication: For Cardioprotective     lisinopril 10 mg oral tablet  -- 1 tab(s) by mouth once a day  -- Indication: For Hypertension    calcium carbonate 1250 mg (500 mg elemental calcium) oral tablet  -- 3 tab(s) by mouth 4 times a day  -- Indication: For Calcium supplement     Janumet 50 mg-1000 mg oral tablet  -- 1 tab(s) by mouth 2 times a day  -- Indication: For Diabetes mellitus type 2 in obese    carvedilol 3.125 mg oral tablet  -- 1 tab(s) by mouth every 12 hours  -- Indication: For Hypertension, chronic systolic heart failure     torsemide 10 mg oral tablet  -- 1 tab(s) by mouth once a day  -- Indication: For Chronic systolic congestive heart failure    sucralfate 1 g oral tablet  -- 1 tab(s) by mouth 2 times a day  -- Indication: For Acute gastritis     levothyroxine 200 mcg (0.2 mg) oral tablet  -- 1 tab(s) by mouth once a day  -- Indication: For Hypothyroidism     calcitriol 0.5 mcg oral capsule  -- 1 cap(s) by mouth once a day  -- Indication: For Vitamin D    ergocalciferol  -- 98614 unit(s) by mouth every 7 days; take on Tuesdays at 6pm  -- Indication: For Vitamin D

## 2017-05-18 NOTE — DIETITIAN INITIAL EVALUATION ADULT. - ENERGY NEEDS
Current weight: 239 pounds (108.6 kg) (5/18)  BMI: 38 kg/m^2  Ideal Body Weight: 135 pounds (61 kg) +/- 10%   *IBW used for protein calculation

## 2017-05-18 NOTE — PROGRESS NOTE ADULT - SUBJECTIVE AND OBJECTIVE BOX
Subjective: No anginal symptoms   	    aspirin enteric coated 81milliGRAM(s) Oral daily  lisinopril 10milliGRAM(s) Oral daily  calcium carbonate 1250 mG (OsCal) 3Tablet(s) Oral four times a day  carvedilol 3.125milliGRAM(s) Oral every 12 hours  torsemide 10milliGRAM(s) Oral daily  calcitriol   Capsule 0.5MICROGram(s) Oral daily  levothyroxine 200MICROGram(s) Oral daily  ergocalciferol 00126Xaxa(s) Oral <User Schedule>  senna 2Tablet(s) Oral at bedtime  morphine  - Injectable 4milliGRAM(s) IV Push every 4 hours PRN  insulin lispro (HumaLOG) corrective regimen sliding scale  SubCutaneous three times a day before meals  insulin lispro (HumaLOG) corrective regimen sliding scale  SubCutaneous at bedtime  insulin glargine Injectable (LANTUS) 12Unit(s) SubCutaneous at bedtime  dextrose 5%. 1000milliLiter(s) IV Continuous <Continuous>  dextrose Gel 1Dose(s) Oral once PRN  dextrose 50% Injectable 12.5Gram(s) IV Push once  dextrose 50% Injectable 25Gram(s) IV Push once  dextrose 50% Injectable 25Gram(s) IV Push once  glucagon  Injectable 1milliGRAM(s) IntraMuscular once PRN  simethicone 80milliGRAM(s) Chew every 6 hours  calcium gluconate IVPB 1Gram(s) IV Intermittent daily  docusate sodium 100milliGRAM(s) Oral two times a day  pantoprazole    Tablet 40milliGRAM(s) Oral before breakfast  sucralfate 1Gram(s) Oral two times a day  heparin  Injectable 5000Unit(s) SubCutaneous every 8 hours                            12.4   7.79  )-----------( 275      ( 18 May 2017 06:00 )             38.4       05-18    141  |  96<L>  |  16  ----------------------------<  196<H>  3.9   |  32<H>  |  1.13    Ca    7.6<L>      18 May 2017 06:00  Mg     1.6     05-18    TPro  7.0  /  Alb  3.7  /  TBili  0.9  /  DBili  0.3<H>  /  AST  85<H>  /  ALT  150<H>  /  AlkPhos  204<H>  05-18            T(C): 36.8, Max: 36.8 (05-18 @ 13:34)  HR: 69 (69 - 86)  BP: 100/69 (100/69 - 129/82)  RR: 18 (18 - 18)  SpO2: 100% (99% - 100%)  Wt(kg): --    I&O's Summary  I & Os for 24h ending 18 May 2017 07:00  =============================================  IN: 320 ml / OUT: 0 ml / NET: 320 ml    I & Os for current day (as of 18 May 2017 15:00)  =============================================  IN: 615 ml / OUT: 0 ml / NET: 615 ml        Appearance: Normal	  HEENT:   Normal oral mucosa, PERRL, EOMI	  Lymphatic: No lymphadenopathy , no edema  Cardiovascular: Normal S1 S2, No JVD, No murmurs , Peripheral pulses palpable 2+ bilaterally  Respiratory: Lungs clear to auscultation, normal effort 	  Gastrointestinal:  Soft, Non-tender, + BS	  Skin: No rashes, No ecchymoses, No cyanosis, warm to touch  Musculoskeletal: Normal range of motion, normal strength  Psychiatry:  Mood & affect appropriate    TELEMETRY: 	  NSR no events       DIAGNOSTIC TESTING:  [ ] Echocardiogram:   CONCLUSIONS:  1. Mitral annular calcification, otherwise normal mitral  valve. Moderate mitral regurgitation.  2. Moderately dilated left atrium.  LA volume index = 46  cc/m2.  3. Severe left ventricular enlargement.  4. Severe global left ventricular systolic dysfunction.  Endocardial visualization enhanced with intravenous  injection of echo contrast (Definity).  5. Normal right ventricular size with decreased right  ventricular systolic function.  6. Estimated right ventricular systolic pressure equals 53  mm Hg, assuming right atrial pressure equals 10 mm Hg,  consistent with moderate pulmonary hypertension.  *** Compared with echocardiogram of 5/22/2014, no  significant changes noted.  -------------------------------------------	      ASSESSMENT/PLAN: 	61y Female with HTN HLD DM thyroid cancer s/p thyroidectomy known RBBB with persistent NICM with normal coronary arteries on cardiac cath 2014 admitted with complaints of abdominal pain with evidence of cholelithiasis on MRCP but no evidence of acute cholecystitis with hypocalcemia of unclear origin with no evidence of anginal symptoms or evidence of decompensated CHF with gastritis and esophagitis on EGD with persistent LV dysfunction on repeat TTE despite optimal medical management:     1)The patient tolerated her EGD well with no complaints.  2)  We  have discussed the above results with her primary cardiologist Dr. Jacobo Davidson. He agrees there is no need for a repeat ischemic evaluation at this time and does agree that the patient should be offered a BI-V ICD for prevention of sudden cardiac death.  3) The patient was offered ICD and refused. She understands R/B/A including sudden cardiac death.   4) Follow up with her primary cardiology Dr. Jacobo Davidson.    5) Would continue with her Coreg and Lisinopril for NICM.     [facility]  O:  [phone]  P: [phone]

## 2017-05-18 NOTE — DISCHARGE NOTE ADULT - HOSPITAL COURSE
Abdominal pain with increased LFTs and evidence of cholelithiasis on MRCP but no evidence of acute cholecystitis or choledocholithiasis s/p EGD with esophagitis and gastritis   --EGD biopsy results testing  -regular diet Patient is a 62 y/o F PMH DM2, sCHF, HTN, thyroid CA s/p thyroidectomy on synthroid and calcitriol, Cholelithiasis p/w abdominal pain, N/V, and diarrhea. Has been having upper abdominal pain x 2 weeks w/ postprandial worsening with belching and early satiety. Reports pain is constant, throughout the day, worsened with eating, relieved with passing gas. Pain is 8/10, now 0/10 s/p morphine in the ER. Patient was in the ER a few days ago with same complaint and had mildly elevated LFTs. Was discharged on reglan for presumed diabetic gastroparesis but did not fill prescription.    The patient was offered and rejected a BI-V ICD for prevention of sudden cardiac death. She understands R/B/A including sudden cardiac death. --> continue with her Coreg and Lisinopril for NICM.     Abdominal pain with increased LFTs and evidence of cholelithiasis on MRCP but no evidence of acute cholecystitis or choledocholithiasis s/p EGD with esophagitis and gastritis   --EGD biopsy results testing;  -regular diet

## 2017-05-18 NOTE — DISCHARGE NOTE ADULT - SECONDARY DIAGNOSIS.
Essential hypertension Type 2 diabetes mellitus without complication, without long-term current use of insulin Chronic systolic congestive heart failure

## 2017-05-18 NOTE — PROGRESS NOTE ADULT - ATTENDING COMMENTS
Pt. seen and examined. Agree with above.   -TTE with severe LV dysfunction  -Continue with medical therapy of NICM  -EPS eval for AICD  -Primary care per medicine

## 2017-05-18 NOTE — DISCHARGE NOTE ADULT - CARE PROVIDER_API CALL
Stephanie Benoit), Internal Medicine  [address] [place] [address]  Phone: [phone]  Fax: [phone]    Efrain Washington), Cardiovascular Disease; Internal Medicine; Nuclear Cardiology  [address] [place] [address]  Phone: [phone]  Fax: [phone]    Carolina Lloyd), EndocrinologyMetabDiabetes; Internal Medicine  [address] [place] [address]  Phone: [phone]  Fax: [phone]    Katie Alva), EndocrinologyMetabDiabetes; Internal Medicine  [address] [place] [address]  Phone: [phone]  Fax: [phone]

## 2017-05-18 NOTE — DIETITIAN INITIAL EVALUATION ADULT. - NS AS NUTRI INTERV MEALS SNACK
1. Recommend to continue Consistent Carbohydrate diet, which remains appropriate at this time/General/healthful diet

## 2017-05-18 NOTE — DIETITIAN INITIAL EVALUATION ADULT. - SIGNS/SYMPTOMS
as evidenced by patient diet recall, not consuming meals for ~2 weeks and NPO status in-house as evidenced by elevated HbA1c 7.6% (5/14)

## 2017-05-18 NOTE — DISCHARGE NOTE ADULT - PATIENT PORTAL LINK FT
“You can access the FollowHealth Patient Portal, offered by [facility], by registering with the following website: [email or web address]”

## 2017-05-18 NOTE — DISCHARGE NOTE ADULT - CARE PROVIDERS DIRECT ADDRESSES
,[email or web address],DirectAddress_Unknown

## 2017-05-18 NOTE — PROGRESS NOTE ADULT - SUBJECTIVE AND OBJECTIVE BOX
INTERVAL HPI/OVERNIGHT EVENTS:    pt with no new gi events. denied n/v, abdominal pain improved    MEDICATIONS  (STANDING):  aspirin enteric coated 81milliGRAM(s) Oral daily  lisinopril 10milliGRAM(s) Oral daily  calcium carbonate 1250 mG (OsCal) 3Tablet(s) Oral four times a day  carvedilol 3.125milliGRAM(s) Oral every 12 hours  torsemide 10milliGRAM(s) Oral daily  calcitriol   Capsule 0.5MICROGram(s) Oral daily  levothyroxine 200MICROGram(s) Oral daily  ergocalciferol 48495Abuw(s) Oral <User Schedule>  senna 2Tablet(s) Oral at bedtime  insulin lispro (HumaLOG) corrective regimen sliding scale  SubCutaneous three times a day before meals  insulin lispro (HumaLOG) corrective regimen sliding scale  SubCutaneous at bedtime  enoxaparin Injectable 40milliGRAM(s) SubCutaneous daily  insulin glargine Injectable (LANTUS) 12Unit(s) SubCutaneous at bedtime  dextrose 5%. 1000milliLiter(s) IV Continuous <Continuous>  dextrose 50% Injectable 12.5Gram(s) IV Push once  dextrose 50% Injectable 25Gram(s) IV Push once  dextrose 50% Injectable 25Gram(s) IV Push once  simethicone 80milliGRAM(s) Chew every 6 hours  calcium gluconate IVPB 1Gram(s) IV Intermittent daily  docusate sodium 100milliGRAM(s) Oral two times a day    MEDICATIONS  (PRN):  morphine  - Injectable 4milliGRAM(s) IV Push every 4 hours PRN mod-severe pain  dextrose Gel 1Dose(s) Oral once PRN Blood Glucose LESS THAN 70 milliGRAM(s)/deciLiter  glucagon  Injectable 1milliGRAM(s) IntraMuscular once PRN Glucose <70 milliGRAM(s)/deciLiter      Allergies    penicillin (Rash)    Intolerances        ROS:   General:  No wt loss, fevers, chills, night sweats, fatigue,   Eyes:  Good vision, no reported pain  ENT:  No sore throat, pain, runny nose, dysphagia  CV:  No pain, palpitations, hypo/hypertension  Resp:  No dyspnea, cough, tachypnea, wheezing  GI:  No pain, No nausea, No vomiting, No diarrhea, No constipation, No weight loss, No fever, No pruritis, No rectal bleeding, No tarry stools, No dysphagia,  :  No pain, bleeding, incontinence, nocturia  Muscle:  No pain, weakness  Neuro:  No weakness, tingling, memory problems  Psych:  No fatigue, insomnia, mood problems, depression  Endocrine:  No polyuria, polydipsia, cold/heat intolerance  Heme:  No petechiae, ecchymosis, easy bruisability  Skin:  No rash, tattoos, scars, edema      PHYSICAL EXAM:   Vital Signs:   Vital Signs Last 24 Hrs  T(C): 36.7, Max: 36.7 ( @ 05:12)  T(F): 98.1, Max: 98.1 ( @ 05:12)  HR: 83 (77 - 86)  BP: 108/61 (108/61 - 129/82)  BP(mean): --  RR: 18 ( - )  SpO2: 99% (99% - 100%)  Daily     Daily Weight in k.4 (18 May 2017 11:28)      GENERAL:  Appears stated age, well-groomed, well-nourished, no distress  HEENT:  NC/AT,  conjunctivae clear and pink, no thyromegaly, nodules, adenopathy, no JVD, sclera -anicteric  CHEST:  Full & symmetric excursion, no increased effort, breath sounds clear  HEART:  Regular rhythm, S1, S2, no murmur/rub/S3/S4, no abdominal bruit, no edema  ABDOMEN:  Soft, non-tender, non-distended, normoactive bowel sounds,  no masses ,no hepato-splenomegaly, no signs of chronic liver disease  EXTEREMITIES:  no cyanosis,clubbing or edema  SKIN:  No rash/erythema/ecchymoses/petechiae/wounds/abscess/warm/dry  NEURO:  Alert, oriented, no asterixis, no tremor, no encephalopathy      LABS:                        12.4   7.79  )-----------( 275      ( 18 May 2017 06:00 )             38.4         141  |  96<L>  |  16  ----------------------------<  196<H>  3.9   |  32<H>  |  1.13    Ca    7.6<L>      18 May 2017 06:00  Mg     1.6     05-18    Hepatitis panel negative          RADIOLOGY & ADDITIONAL TESTS:  Impression:          - LA Grade B reflux esophagitis.                       - Acute gastritis. Biopsied.              - Erythematous duodenopathy.

## 2017-05-18 NOTE — DIETITIAN INITIAL EVALUATION ADULT. - INDICATOR
3. Monitor weights, labs, BM's, skin integrity, p.o. intake. RD to remain available, Georgina Rodriguez, DENITA, CDN

## 2017-05-18 NOTE — PROGRESS NOTE ADULT - PROBLEM SELECTOR PLAN 1
S/P EGDImpression:          - LA Grade B reflux esophagitis.                       - Acute gastritis. Biopsied.              - Erythematous duodenopathy.  Recommendation:      - Await pathology results.                                                                                  Outpt F/U

## 2017-05-18 NOTE — PROGRESS NOTE ADULT - SUBJECTIVE AND OBJECTIVE BOX
EP ATTENDING    EGD showed gastritis    Telemetry: SR with bifasicular block, NSVT    Subjective: no palpitations, no syncope, no angina  	  aspirin enteric coated 81milliGRAM(s) Oral daily  lisinopril 10milliGRAM(s) Oral daily  calcium carbonate 1250 mG (OsCal) 3Tablet(s) Oral four times a day  carvedilol 3.125milliGRAM(s) Oral every 12 hours  torsemide 10milliGRAM(s) Oral daily  calcitriol   Capsule 0.5MICROGram(s) Oral daily  levothyroxine 200MICROGram(s) Oral daily  ergocalciferol 33548Crcg(s) Oral <User Schedule>  senna 2Tablet(s) Oral at bedtime  morphine  - Injectable 4milliGRAM(s) IV Push every 4 hours PRN  insulin lispro (HumaLOG) corrective regimen sliding scale  SubCutaneous three times a day before meals  insulin lispro (HumaLOG) corrective regimen sliding scale  SubCutaneous at bedtime  insulin glargine Injectable (LANTUS) 12Unit(s) SubCutaneous at bedtime  dextrose 5%. 1000milliLiter(s) IV Continuous <Continuous>  dextrose Gel 1Dose(s) Oral once PRN  dextrose 50% Injectable 12.5Gram(s) IV Push once  dextrose 50% Injectable 25Gram(s) IV Push once  dextrose 50% Injectable 25Gram(s) IV Push once  glucagon  Injectable 1milliGRAM(s) IntraMuscular once PRN  simethicone 80milliGRAM(s) Chew every 6 hours  calcium gluconate IVPB 1Gram(s) IV Intermittent daily  docusate sodium 100milliGRAM(s) Oral two times a day  pantoprazole    Tablet 40milliGRAM(s) Oral before breakfast  sucralfate 1Gram(s) Oral two times a day  heparin  Injectable 5000Unit(s) SubCutaneous every 8 hours  calcium gluconate IVPB 1Gram(s) IV Intermittent once  potassium chloride    Tablet ER 10milliEquivalent(s) Oral once                            12.4   7.79  )-----------( 275      ( 18 May 2017 06:00 )             38.4       05-18    141  |  96<L>  |  16  ----------------------------<  196<H>  3.9   |  32<H>  |  1.13    Ca    7.6<L>      18 May 2017 06:00  Mg     1.6     05-18              T(C): 36.7, Max: 36.7 (05-18 @ 05:12)  HR: 83 (77 - 86)  BP: 108/61 (108/61 - 129/82)  RR: 18 (18 - 18)  SpO2: 99% (99% - 100%)  Wt(kg): --    I&O's Summary  I & Os for 24h ending 18 May 2017 07:00  =============================================  IN: 320 ml / OUT: 0 ml / NET: 320 ml    I & Os for current day (as of 18 May 2017 10:49)  =============================================  IN: 320 ml / OUT: 0 ml / NET: 320 ml    Appearance: Normal	  HEENT:   Normal oral mucosa, PERRL, EOMI	  Lymphatic: No lymphadenopathy , no edema  Cardiovascular: Normal S1 S2, No JVD, No murmurs , Peripheral pulses palpable 2+ bilaterally  Respiratory: Lungs clear to auscultation, normal effort 	  Gastrointestinal:  Soft, Non-tender, + BS	  Skin: No rashes, No ecchymoses, No cyanosis, warm to touch  Psychiatry:  Mood & affect appropriate      ECHO: LVEF 22%    	  ASSESSMENT/PLAN: 	60 y/o female with a long standing history of a dilated non-ischemic cardiomyopathy. Despite greater than 9 months of optimal medical therapy she continues to have NYHA Class III CHF and a persistently reduced LVEF (22%). Previous cath was normal about 2 years ago. In addition her EKG shows bifascicular block with QRS > 120 ms.     -given the above I recommended BiV-ICD implantation for the primary prevention of sudden cardiac death. I cited a 3% risk of bleeding or infection, and a 1% risk of major complication including but not limited to heart attack, stroke, death or need for emergency open heart surgery. Understanding her R/B/A she refuses ICD implantation at this time. She fully understands the risks without an ICD - including preventible sudden cardiac death. I provided my contact information and informative literature in case she wishes to pursue BIV-ICD implantation as an outpatient.  -d/c planning as per primary team      Dariana Hallman M.D., [facility]  [phone]

## 2017-05-18 NOTE — DIETITIAN INITIAL EVALUATION ADULT. - OTHER INFO
Patient seen for nutrition consult (BMI > 40 kg/m^2). Patient reports having persistent nausea/vomiting and poor appetite for 2 weeks prior to admission. Prior to GI distress patient reports she would eat well and would try to limit salt and sugar intake. Since admission patient has primarily been NPO for tests. Patient states she was able to tolerate dinner last night (5/17). Patient reports her nausea has improved. Patient denies any change in weight.

## 2017-05-18 NOTE — PROGRESS NOTE ADULT - ASSESSMENT
Abdominal pain with increased LFTs and evidence of cholelithiasis on MRCP but no evidence of acute cholecystitis or choledocholithiasis s/p EGD with esophagitis and gastritis   -am labs   -avoid hepatotoxins  -ppi qd  -carafate bid  -simethicone q6h  -EGD biopsy results testing  -regular diet  -cardiac alfred in progress

## 2017-05-18 NOTE — DISCHARGE NOTE ADULT - CARE PLAN
Principal Discharge DX:	Transaminitis  Goal:	to prevent future episodes  Instructions for follow-up, activity and diet:	follow up with PCP in  week, take medications as prescribed,  Secondary Diagnosis:	Essential hypertension  Goal:	follow up with PCP in  week, take medications as prescribed,  Secondary Diagnosis:	Type 2 diabetes mellitus without complication, without long-term current use of insulin  Goal:	follow up with Endocrine in  2 weeks, take medications as prescribed,  Secondary Diagnosis:	Chronic systolic congestive heart failure  Goal:	follow up with PCP in  week, take medications as prescribed, Principal Discharge DX:	Transaminitis  Goal:	to prevent future episodes  Instructions for follow-up, activity and diet:	follow up with PCP in  week, take medications as prescribed,  Secondary Diagnosis:	Essential hypertension  Goal:	follow up with PCP in  week, take medications as prescribed,  Secondary Diagnosis:	Type 2 diabetes mellitus without complication, without long-term current use of insulin  Goal:	follow up with Endocrine in  2 weeks, take medications as prescribed,  Instructions for follow-up, activity and diet:	pt was started on Janumet and will follow with endocrine in  weeks  Secondary Diagnosis:	Chronic systolic congestive heart failure  Goal:	follow up with PCP in  week, take medications as prescribed,

## 2017-05-18 NOTE — DISCHARGE NOTE ADULT - PLAN OF CARE
to prevent future episodes follow up with PCP in  week, take medications as prescribed, follow up with Endocrine in  2 weeks, take medications as prescribed, pt was started on Janumet and will follow with endocrine in  weeks

## 2017-05-18 NOTE — PROGRESS NOTE ADULT - SUBJECTIVE AND OBJECTIVE BOX
INTERVAL HPI/OVERNIGHT EVENTS: I feel great and want to go home. Will sit down with family and decide about AICD.   Vital Signs Last 24 Hrs  T(C): 36.8, Max: 36.8 (05-18 @ 13:34)  T(F): 98.2, Max: 98.2 (05-18 @ 13:34)  HR: 69 (69 - 86)  BP: 100/69 (100/69 - 129/82)  BP(mean): --  RR: 18 (18 - 18)  SpO2: 100% (99% - 100%)  I&O's Summary  I & Os for 24h ending 18 May 2017 07:00  =============================================  IN: 320 ml / OUT: 0 ml / NET: 320 ml    I & Os for current day (as of 18 May 2017 15:50)  =============================================  IN: 615 ml / OUT: 0 ml / NET: 615 ml    MEDICATIONS  (STANDING):  aspirin enteric coated 81milliGRAM(s) Oral daily  lisinopril 10milliGRAM(s) Oral daily  calcium carbonate 1250 mG (OsCal) 3Tablet(s) Oral four times a day  carvedilol 3.125milliGRAM(s) Oral every 12 hours  torsemide 10milliGRAM(s) Oral daily  calcitriol   Capsule 0.5MICROGram(s) Oral daily  levothyroxine 200MICROGram(s) Oral daily  ergocalciferol 55731Zlcp(s) Oral <User Schedule>  senna 2Tablet(s) Oral at bedtime  insulin lispro (HumaLOG) corrective regimen sliding scale  SubCutaneous three times a day before meals  insulin lispro (HumaLOG) corrective regimen sliding scale  SubCutaneous at bedtime  insulin glargine Injectable (LANTUS) 12Unit(s) SubCutaneous at bedtime  dextrose 5%. 1000milliLiter(s) IV Continuous <Continuous>  dextrose 50% Injectable 12.5Gram(s) IV Push once  dextrose 50% Injectable 25Gram(s) IV Push once  dextrose 50% Injectable 25Gram(s) IV Push once  simethicone 80milliGRAM(s) Chew every 6 hours  calcium gluconate IVPB 1Gram(s) IV Intermittent daily  docusate sodium 100milliGRAM(s) Oral two times a day  pantoprazole    Tablet 40milliGRAM(s) Oral before breakfast  sucralfate 1Gram(s) Oral two times a day  heparin  Injectable 5000Unit(s) SubCutaneous every 8 hours    MEDICATIONS  (PRN):  morphine  - Injectable 4milliGRAM(s) IV Push every 4 hours PRN mod-severe pain  dextrose Gel 1Dose(s) Oral once PRN Blood Glucose LESS THAN 70 milliGRAM(s)/deciLiter  glucagon  Injectable 1milliGRAM(s) IntraMuscular once PRN Glucose <70 milliGRAM(s)/deciLiter    LABS:                        12.4   7.79  )-----------( 275      ( 18 May 2017 06:00 )             38.4     05-18    141  |  96<L>  |  16  ----------------------------<  196<H>  3.9   |  32<H>  |  1.13    Ca    7.6<L>      18 May 2017 06:00  Mg     1.6     05-18    TPro  7.0  /  Alb  3.7  /  TBili  0.9  /  DBili  0.3<H>  /  AST  85<H>  /  ALT  150<H>  /  AlkPhos  204<H>  05-18        CAPILLARY BLOOD GLUCOSE  158 (18 May 2017 11:26)  200 (18 May 2017 08:28)  218 (17 May 2017 22:44)  115 (17 May 2017 18:14)          REVIEW OF SYSTEMS:  CONSTITUTIONAL: No fever, weight loss, or fatigue  EYES: No eye pain, visual disturbances, or discharge  ENMT:  No difficulty hearing, tinnitus, vertigo; No sinus or throat pain  NECK: No pain or stiffness  BREASTS: No pain, masses, or nipple discharge  RESPIRATORY: No cough, wheezing, chills or hemoptysis; No shortness of breath  CARDIOVASCULAR: No chest pain, palpitations, dizziness, or leg swelling  GASTROINTESTINAL: No abdominal or epigastric pain. No nausea, vomiting, or hematemesis; No diarrhea or constipation. No melena or hematochezia.  GENITOURINARY: No dysuria, frequency, hematuria, or incontinence  NEUROLOGICAL: No headaches, memory loss, loss of strength, numbness, or tremors  SKIN: No itching, burning, rashes, or lesions   LYMPH NODES: No enlarged glands  ENDOCRINE: No heat or cold intolerance; No hair loss  MUSCULOSKELETAL: No joint pain or swelling; No muscle, back, or extremity pain  PSYCHIATRIC: No depression, anxiety, mood swings, or difficulty sleeping  HEME/LYMPH: No easy bruising, or bleeding gums  ALLERY AND IMMUNOLOGIC: No hives or eczema    RADIOLOGY & ADDITIONAL TESTS:    Imaging Personally Reviewed:  [ ] YES  [ ] NO    Consultant(s) Notes Reviewed:  [x ] YES  [ ] NO    PHYSICAL EXAM:  GENERAL: NAD, well-groomed, well-developed  HEAD:  Atraumatic, Normocephalic  EYES: EOMI, PERRLA, conjunctiva and sclera clear  ENMT: No tonsillar erythema, exudates, or enlargement; Moist mucous membranes, Good dentition, No lesions  NECK: Supple, No JVD, Normal thyroid  NERVOUS SYSTEM:  Alert & Oriented X3, Good concentration; Motor Strength 5/5 B/L upper and lower extremities; DTRs 2+ intact and symmetric  CHEST/LUNG: Clear to percussion bilaterally; No rales, rhonchi, wheezing, or rubs  HEART: Regular rate and rhythm; No murmurs, rubs, or gallops  ABDOMEN: Soft, Nontender, Nondistended; Bowel sounds present  EXTREMITIES:  2+ Peripheral Pulses, No clubbing, cyanosis, or edema  LYMPH: No lymphadenopathy noted  SKIN: No rashes or lesions    Care Discussed with Consultants/Other Providers [ x] YES  [ ] NO

## 2017-06-07 ENCOUNTER — EMERGENCY (EMERGENCY)
Facility: HOSPITAL | Age: 62
LOS: 1 days | Discharge: HOME | End: 2017-06-07
Attending: EMERGENCY MEDICINE | Admitting: EMERGENCY MEDICINE
Payer: COMMERCIAL

## 2017-06-07 VITALS — TEMPERATURE: 98 F | HEART RATE: 94 BPM | RESPIRATION RATE: 18 BRPM | OXYGEN SATURATION: 100 %

## 2017-06-07 PROCEDURE — 99285 EMERGENCY DEPT VISIT HI MDM: CPT | Mod: 25

## 2017-06-07 PROCEDURE — 93010 ELECTROCARDIOGRAM REPORT: CPT

## 2017-06-07 NOTE — ED ADULT TRIAGE NOTE - CHIEF COMPLAINT QUOTE
Pt comes in for SOB and nausea/vomiting for last two weeks, reports abd pain and recent admission for similar symptoms. Pt in no acute distress, vs as noted.

## 2017-06-08 VITALS — HEART RATE: 91 BPM | SYSTOLIC BLOOD PRESSURE: 113 MMHG | DIASTOLIC BLOOD PRESSURE: 56 MMHG | TEMPERATURE: 98 F

## 2017-06-08 DIAGNOSIS — R10.10 UPPER ABDOMINAL PAIN, UNSPECIFIED: ICD-10-CM

## 2017-06-08 LAB
ALBUMIN SERPL ELPH-MCNC: 4.2 G/DL — SIGNIFICANT CHANGE UP (ref 3.3–5)
ALP SERPL-CCNC: 161 U/L — HIGH (ref 40–120)
ALT FLD-CCNC: 75 U/L — HIGH (ref 4–33)
AST SERPL-CCNC: 86 U/L — HIGH (ref 4–32)
BASE EXCESS BLDV CALC-SCNC: 2.6 MMOL/L — SIGNIFICANT CHANGE UP
BASOPHILS # BLD AUTO: 0.04 K/UL — SIGNIFICANT CHANGE UP (ref 0–0.2)
BASOPHILS NFR BLD AUTO: 0.5 % — SIGNIFICANT CHANGE UP (ref 0–2)
BILIRUB SERPL-MCNC: 0.7 MG/DL — SIGNIFICANT CHANGE UP (ref 0.2–1.2)
BLOOD GAS VENOUS - CREATININE: 1.12 MG/DL — SIGNIFICANT CHANGE UP (ref 0.5–1.3)
BUN SERPL-MCNC: 19 MG/DL — SIGNIFICANT CHANGE UP (ref 7–23)
CALCIUM SERPL-MCNC: 6 MG/DL — CRITICAL LOW (ref 8.4–10.5)
CHLORIDE BLDV-SCNC: 94 MMOL/L — LOW (ref 96–108)
CHLORIDE SERPL-SCNC: 89 MMOL/L — LOW (ref 98–107)
CO2 SERPL-SCNC: 22 MMOL/L — SIGNIFICANT CHANGE UP (ref 22–31)
CREAT SERPL-MCNC: 1.22 MG/DL — SIGNIFICANT CHANGE UP (ref 0.5–1.3)
EOSINOPHIL # BLD AUTO: 0.05 K/UL — SIGNIFICANT CHANGE UP (ref 0–0.5)
EOSINOPHIL NFR BLD AUTO: 0.6 % — SIGNIFICANT CHANGE UP (ref 0–6)
GAS PNL BLDV: 131 MMOL/L — LOW (ref 136–146)
GLUCOSE BLDV-MCNC: 231 — HIGH (ref 70–99)
GLUCOSE SERPL-MCNC: 222 MG/DL — HIGH (ref 70–99)
HCO3 BLDV-SCNC: 26 MMOL/L — SIGNIFICANT CHANGE UP (ref 20–27)
HCT VFR BLD CALC: 40.8 % — SIGNIFICANT CHANGE UP (ref 34.5–45)
HCT VFR BLDV CALC: 40.9 % — SIGNIFICANT CHANGE UP (ref 34.5–45)
HGB BLD-MCNC: 13.1 G/DL — SIGNIFICANT CHANGE UP (ref 11.5–15.5)
HGB BLDV-MCNC: 13.3 G/DL — SIGNIFICANT CHANGE UP (ref 11.5–15.5)
IMM GRANULOCYTES NFR BLD AUTO: 0.5 % — SIGNIFICANT CHANGE UP (ref 0–1.5)
LACTATE BLDV-MCNC: 2.3 MMOL/L — HIGH (ref 0.5–2)
LIDOCAIN IGE QN: 48.1 U/L — SIGNIFICANT CHANGE UP (ref 7–60)
LYMPHOCYTES # BLD AUTO: 1.66 K/UL — SIGNIFICANT CHANGE UP (ref 1–3.3)
LYMPHOCYTES # BLD AUTO: 19 % — SIGNIFICANT CHANGE UP (ref 13–44)
MCHC RBC-ENTMCNC: 25.5 PG — LOW (ref 27–34)
MCHC RBC-ENTMCNC: 32.1 % — SIGNIFICANT CHANGE UP (ref 32–36)
MCV RBC AUTO: 79.4 FL — LOW (ref 80–100)
MONOCYTES # BLD AUTO: 0.44 K/UL — SIGNIFICANT CHANGE UP (ref 0–0.9)
MONOCYTES NFR BLD AUTO: 5 % — SIGNIFICANT CHANGE UP (ref 2–14)
NEUTROPHILS # BLD AUTO: 6.51 K/UL — SIGNIFICANT CHANGE UP (ref 1.8–7.4)
NEUTROPHILS NFR BLD AUTO: 74.4 % — SIGNIFICANT CHANGE UP (ref 43–77)
PCO2 BLDV: 45 MMHG — SIGNIFICANT CHANGE UP (ref 41–51)
PH BLDV: 7.4 PH — SIGNIFICANT CHANGE UP (ref 7.32–7.43)
PLATELET # BLD AUTO: 331 K/UL — SIGNIFICANT CHANGE UP (ref 150–400)
PMV BLD: 11.5 FL — SIGNIFICANT CHANGE UP (ref 7–13)
PO2 BLDV: 40 MMHG — SIGNIFICANT CHANGE UP (ref 35–40)
POTASSIUM BLDV-SCNC: 7.3 MMOL/L — CRITICAL HIGH (ref 3.4–4.5)
POTASSIUM SERPL-MCNC: 5 MMOL/L — SIGNIFICANT CHANGE UP (ref 3.5–5.3)
POTASSIUM SERPL-SCNC: 5 MMOL/L — SIGNIFICANT CHANGE UP (ref 3.5–5.3)
PROT SERPL-MCNC: 8 G/DL — SIGNIFICANT CHANGE UP (ref 6–8.3)
RBC # BLD: 5.14 M/UL — SIGNIFICANT CHANGE UP (ref 3.8–5.2)
RBC # FLD: 16.4 % — HIGH (ref 10.3–14.5)
SAO2 % BLDV: 69.4 % — SIGNIFICANT CHANGE UP (ref 60–85)
SODIUM SERPL-SCNC: 133 MMOL/L — LOW (ref 135–145)
WBC # BLD: 8.74 K/UL — SIGNIFICANT CHANGE UP (ref 3.8–10.5)
WBC # FLD AUTO: 8.74 K/UL — SIGNIFICANT CHANGE UP (ref 3.8–10.5)

## 2017-06-08 PROCEDURE — 76705 ECHO EXAM OF ABDOMEN: CPT | Mod: 26

## 2017-06-08 PROCEDURE — 71010: CPT | Mod: 26

## 2017-06-08 RX ORDER — CALCIUM GLUCONATE 100 MG/ML
1 VIAL (ML) INTRAVENOUS ONCE
Qty: 0 | Refills: 0 | Status: COMPLETED | OUTPATIENT
Start: 2017-06-08 | End: 2017-06-08

## 2017-06-08 RX ORDER — FAMOTIDINE 10 MG/ML
20 INJECTION INTRAVENOUS ONCE
Qty: 0 | Refills: 0 | Status: COMPLETED | OUTPATIENT
Start: 2017-06-08 | End: 2017-06-08

## 2017-06-08 RX ORDER — MORPHINE SULFATE 50 MG/1
2 CAPSULE, EXTENDED RELEASE ORAL ONCE
Qty: 0 | Refills: 0 | Status: DISCONTINUED | OUTPATIENT
Start: 2017-06-08 | End: 2017-06-08

## 2017-06-08 RX ORDER — METOCLOPRAMIDE HCL 10 MG
10 TABLET ORAL ONCE
Qty: 0 | Refills: 0 | Status: COMPLETED | OUTPATIENT
Start: 2017-06-08 | End: 2017-06-08

## 2017-06-08 RX ADMIN — MORPHINE SULFATE 2 MILLIGRAM(S): 50 CAPSULE, EXTENDED RELEASE ORAL at 03:32

## 2017-06-08 RX ADMIN — FAMOTIDINE 20 MILLIGRAM(S): 10 INJECTION INTRAVENOUS at 01:24

## 2017-06-08 RX ADMIN — Medication 10 MILLIGRAM(S): at 05:31

## 2017-06-08 RX ADMIN — MORPHINE SULFATE 2 MILLIGRAM(S): 50 CAPSULE, EXTENDED RELEASE ORAL at 04:02

## 2017-06-08 RX ADMIN — Medication 30 MILLILITER(S): at 09:05

## 2017-06-08 RX ADMIN — Medication 200 GRAM(S): at 02:34

## 2017-06-08 RX ADMIN — Medication 30 MILLILITER(S): at 01:24

## 2017-06-08 NOTE — ED PROVIDER NOTE - OBJECTIVE STATEMENT
61F h/o CHF, DM, HTN, thyroid ca s/p resection p/w abd pain, n/v. Pt states admitted to hospital in May for same symptoms. US, HIDA, and MRI done to assess for tin, all showed stones but no acute issues. EGD at that time also showed gastritis. Pt states had some relief at discahrge but symptoms have now returned. Reports "turning" tight pain in upper adomen without radiation to back. Denies fever/chills. No diarrhea. Also reports reflux pain. States pain is simialr to what she was previously having at time of last hospitalization. 61F h/o CHF, DM, HTN, thyroid ca s/p resection p/w abd pain, n/v. Pt states admitted to hospital in May for same symptoms. US, HIDA, and MRI done to assess for tin, all showed stones but no acute issues. EGD at that time also showed gastritis. Pt states had some relief at discharge but symptoms have now returned. Reports "turning" tight pain in upper adomen without radiation to back. Denies fever/chills. No diarrhea. Also reports reflux pain. States pain is simialr to what she was previously having at time of last hospitalization. 61F h/o CHF, DM, HTN, thyroid ca s/p resection p/w abd pain, n/v. Pt states admitted to hospital in May for same symptoms. US, HIDA, and MRI done to assess for tin, all showed stones but no acute issues. EGD at that time also showed gastritis. Pt states had some relief at discharge but symptoms have now returned. Reports "turning" tight pain in upper abdomen without radiation to back. Denies fever/chills. No diarrhea. Also reports reflux pain. States pain is simialr to what she was previously having at time of last hospitalization.

## 2017-06-08 NOTE — ED PROVIDER NOTE - PROGRESS NOTE DETAILS
pt tolerated PO, seen and cleared by surgery.  calcium repleted, pt has appointment with PMD today at 4

## 2017-06-08 NOTE — ED PROVIDER NOTE - MEDICAL DECISION MAKING DETAILS
61F c/o abd pain w/ n/v w/ recent admission with thorough w/u for same pain showing gallstones and gastritis; check CBC, CMP, lipase; pepcid, maalox for pain, morphine if not improved; imaging from previous hospitalization reviewed; EKG and cardiac enxymes

## 2017-06-08 NOTE — CONSULT NOTE ADULT - SUBJECTIVE AND OBJECTIVE BOX
This is a morbidly obese 61- year old female who present the second time in a month with persistent vague symptoms of upper abdominal pain, nausea, vomiting, fatigue dyspnea and aches. She states she wakes "every morning" with abdominal pain and sometimes worsens with eating. Per the patient it is located in the upper abdomen. She describes the pain as dull non-radiating and constant. No alleviating factors could be elicited.  She is accompanied by her . She denies any recent trauma. The pain was without fever, chills, or, diarrhea. She has not had any recent sick exposures and denied having had foreign travel. She has not had urinary symptoms of urgency, frequency or dysuria. She is not sexually active.    Previous work-up on the admission this year was negative for Acute Cholecystitis. Including MRCP/EGD/CT Abdomen/Pelvis/RUQ US/HIDA    She has multiple medical comorbidities which would more likely explain her pain, including This is a morbidly obese 61- year old female who present the second time in a month with persistent vague symptoms of upper abdominal pain, nausea, vomiting, fatigue dyspnea and aches. She states she wakes "every morning" with abdominal pain and sometimes worsens with eating. Per the patient it is located in the upper abdomen. She describes the pain as dull non-radiating and constant. No alleviating factors could be elicited.  She is accompanied by her . She denies any recent trauma. The pain was without fever, chills, or, diarrhea. She has not had any recent sick exposures and denied having had foreign travel. She has not had urinary symptoms of urgency, frequency or dysuria. She is not sexually active.    Previous work-up on the admission this year was negative for Acute Cholecystitis. Including MRCP/EGD/CT Abdomen/Pelvis/RUQ US/HIDA.       MEDICAL & SURGICAL HISTORY: Asthma, CHF, hypothyroid, DM, HTN, Thyroid ca: S/P thyroidectomy  Allergies: penicillin (Rash)  Medications: Lasix, Calcitriol, Levothyroxine, sucralfate,  	lisinopril, aspirin, carvedilol, torsemide. ergocalciferol.   She does not use ethanol or tobacco.    Vital Signs T(C): 36.4, Max: 36.9 HR: 92 (90 - 94) BP: 130/94 (112/76 - 130/94) RR: 19 (18 - 22) SpO2: 98% (98% - 100%)    She was awake, alert and in no distress  She was anicteric and she did not have thrush. Her oropharyngeal mucosa was normal. She had reactive pupils and her extra-occular movements were intact. She did not have JVD. Her lungs were clear bilaterally and she had non-labored respirations. She had symmetrical chest wall movements. She had regular heart tones and she did not have a murmur or gallop. Her abdomen was obese, soft, nondistended with tenderness in the right upper quadrant and epigastrium. She did have rebound. - Psoas and obturator signs. There were no palpable masses or abdominal wall hernias. She had active bowel sounds. She had normal external genitalia. She did not have a rash. Her extremities were well perfused. She had a normal musculoskeletal exam.    An 22 gauge IV cannula was inserted into a vein of her left upper extremity and fluid was given.     CBC Full  -  ( 07 Jun 2017 00:30 )  WBC Count : 8.74 K/uL  Hemoglobin : 13.1 g/dL  Hematocrit : 40.8 %  Platelet Count - Automated : 331 K/uL  Mean Cell Volume : 79.4 fL  Mean Cell Hemoglobin : 25.5 pg  Mean Cell Hemoglobin Concentration : 32.1 %  Auto Neutrophil # : 6.51 K/uL  Auto Lymphocyte # : 1.66 K/uL  Auto Monocyte # : 0.44 K/uL  Auto Eosinophil # : 0.05 K/uL  Auto Basophil # : 0.04 K/uL  Auto Neutrophil % : 74.4 %  Auto Lymphocyte % : 19.0 %  Auto Monocyte % : 5.0 %  Auto Eosinophil % : 0.6 %  Auto Basophil % : 0.5 %    06-08    133<L>  |  89<L>  |  19  ----------------------------<  222<H>  5.0   |  22  |  1.22    Ca    6.0<LL>      08 Jun 2017 00:30    TPro  8.0  /  Alb  4.2  /  TBili  0.7  /  DBili  x   /  AST  86<H>  /  ALT  75<H>  /  AlkPhos  161<H>  06-08    Radiology:   EXAM:  US ABDOMEN LIMITED    PROCEDURE DATE:  Jun 8 2017   INTERPRETATION:  CLINICAL INFORMATION: Known history of gall stones. Worsening right upper quadrant pain.  COMPARISON: Right upper quadrant sonogram dated 5/13/2017.  TECHNIQUE: Sonography of the right upper quadrant.   FINDINGS: The study is limited by excessive bowel gas.  Liver: Not well visualized. The right hepatic lobe measures 17.8 cm in length.  Bile ducts: Normal caliber. Common duct measures up to 6 mm.   Gallbladder: Not identified. Unable to evaluate for sonographic Davila sign as patient received pain medication prior to the study.  Pancreas: Obscured by bowel gas but the visualized portions of the head and body are unremarkable.  Right kidney: 10.9 cm in length. No hydronephrosis or shadowing stones.  Ascites: None.  Aorta and IVC: Within normal limits.  IMPRESSION: Limited exam examination due to excessive bowel gas. The gallbladder is not identified. No biliary ductal dilatation.  JOHANNA CONROY M.D., RADIOLOGY RESIDENT  This document has been electronically signed.  BARI BALL M.D., ATTENDING RADIOLOGIST  This document has been electronically signed. Jun 8 2017  2:42AM    Historical Exams:    Location: [place] [address] Sonographer: Max Cole  Study quality: Technically Difficult  Referring Physician: Tiara Rojas MD  Blood Pressure: 152/71 mmHg  Height: 170 cm  Weight: 107 kg  BSA: 2.2 m2  ------------------------------------------------------------------------  PROCEDURE: Transthoracic echocardiogram with 2-D, M-Mode  and complete spectral and color flow Doppler.  Verbal consent was obtained for injection of echo contrast.  Following  intravenous injection of contrast, harmonic  imaging was performed.lot#[id number]  INDICATION: Unspecified systolic (congestive) heart failure  (I50.20)  ------------------------------------------------------------------------  DIMENSIONS:  Dimensions:     Normal Values:  LA:     4.6 cm    2.0 - 4.0 cm  Ao:     2.5 cm    2.0 - 3.8 cm  SEPTUM: 0.8 cm    0.6 - 1.2 cm  PWT:    0.7 cm    0.6 - 1.1 cm  LVIDd:  6.8 cm    3.0 - 5.6 cm  LVIDs:  6.1cm    1.8 - 4.0 cm  Derived Variables:  LVMI: 99 g/m2  RWT: 0.20  Fractional short: 10 %  Ejection Fraction (Teicholtz): 22 %  ------------------------------------------------------------------------  OBSERVATIONS:  Mitral Valve: Mitral annular calcification, otherwise  normal mitral valve. Moderate mitral regurgitation.  Aortic Root: Normal aortic root.  Aortic Valve: Calcified trileaflet aortic valve with normal  opening.  Left Atrium: Moderately dilated left atrium.  LA volume  index = 46 cc/m2.  Left Ventricle: Severe global left ventricular systolic  dysfunction.  Endocardial visualization enhanced with  intravenous injection of echo contrast (Definity). Severe  left ventricular enlargement.  Right Heart: Normal right atrium. Normal right ventricular  size with decreased right ventricular systolic function.  Normal tricuspid valve.  Moderate tricuspid regurgitation.  Normal pulmonic valve.  Pericardium/PleuraNormal pericardium with no pericardial  effusion.  Hemodynamic: Estimated right ventricular systolic pressure  equals 53 mm Hg, assuming right atrial pressure equals 10  mm Hg, consistent with moderate pulmonary hypertension.  ------------------------------------------------------------------------  CONCLUSIONS:  1. Mitral annular calcification, otherwise normal mitral  valve. Moderate mitral regurgitation.  2. Moderately dilated left atrium.  LA volume index = 46  cc/m2.  3. Severe left ventricular enlargement.  4. Severe global left ventricular systolic dysfunction.  Endocardial visualization enhanced with intravenous  injection of echo contrast (Definity).  5. Normal right ventricular size with decreased right  ventricular systolic function.  6. Estimated right ventricular systolic pressure equals 53  mm Hg, assuming right atrial pressure equals 10 mm Hg,  consistent with moderate pulmonary hypertension.  *** Compared with echocardiogram of 5/22/2014, no  significant changes noted.  ------------------------------------------------------------------------  Confirmed on  5/16/2017 - 11:49:02 by Xander Tyson M.D.  ------------------------------------------------------------------------  EXAM:  MRI UPPER ABDOMEN W O C    PROCEDURE DATE:  May 15 2017   INTERPRETATION:  CLINICAL INFORMATION: Elevated liver function tests and gallbladder sludge. Evaluate for stone.  COMPARISON: CT abdomen and pelvis 5/13/2017.  PROCEDURE: MRI/MRCP was performed without intravenous contrast. Radial and 3D MRCP sequences were obtained.   FINDINGS:  LOWER CHEST: Cardiomegaly.  LIVER: Within normal limits.  BILE DUCTS: No choledocholithiasis.  GALLBLADDER: Cholelithiasis. Trace pericholecystic fluid.  SPLEEN: Within normal limits.  PANCREAS: Within normal limits.  ADRENALS: Within normal limits.  KIDNEYS/URETERS: Subcentimeter cyst in the left kidney. No hydronephrosis.  VISUALIZED PORTIONS:  BOWEL: Within normal limits.   PERITONEUM: No ascites.  VESSELS: Within normal limits.  RETROPERITONEUM: No lymphadenopathy.    ABDOMINAL WALL: Within normal limits.    IMPRESSION: Cholelithiasis. No choledocholithiasis.  POLLO OGDEN M.D., RADIOLOGY RESIDENT  This document has been electronically signed.  JOSUÉ BABB M.D., ATTENDING RADIOLOGIST  This document has been electronically signed. May 15 2017  4:26PM    EXAM:  CT ABDOMEN AND PELVIS IC    PROCEDURE DATE:  May 13 2017   INTERPRETATION:  CLINICAL INFORMATION: Right upper quadrant abdominal pain  COMPARISON: None  PROCEDURE: CT of the Abdomen and Pelvis was performed with intravenous contrast. Intravenous contrast: 90 ml Omnipaque 350. 10 ml discarded.  Oral contrast: positive contrast was administered.Sagittal and coronal reformats were performed.  FINDINGS:  LOWER CHEST: Basilar subsegmental atelectasis.  LIVER: Diffusehepatic steatosis.  BILE DUCTS: Normal caliber.  GALLBLADDER: Contracted with cholelithiasis and  trace pericholecystic fluid.  SPLEEN: Within normal limits.  PANCREAS: Within normal limits.  ADRENALS: Within normal limits.  KIDNEYS/URETERS: Withinnormal limits.  BLADDER: Within normal limits.  REPRODUCTIVE ORGANS: No adnexal masses  BOWEL: No bowel obstruction. Appendix is normal.  PERITONEUM: No ascites.  VESSELS:  Calcified and noncalcified atherosclerotic plaque is noted throughout the aorta and its branches. Retrograde enhancement of the IVC and hepatic veins suggesting tricuspid insufficiency.  RETROPERITONEUM: No lymphadenopathy.    ABDOMINAL WALL: Within normal limits.  BONES: Degenerative changes of the thoracolumbar spine.  IMPRESSION: Gallbladder is contracted with cholelithiasis and trace pericholecystic fluid. If further evaluation is needed a HIDA may be performed.  TAMIKO DEL ROSARIO M.D., RADIOLOGY RESIDENT  This document has been electronically signed.  LILLIAN MARTINEZ M.D., ATTENDING RADIOLOGIST  This document has been electronically signed. May 13 2017  3:23PM      EXAM:  NM HEPATOBILIARY IMG    PROCEDURE DATE:  May 13 2017   INTERPRETATION:  CLINICAL INFORMATION: 61-year-old female with right upper quadrant abdominal pain and CT findings of cholelithiasis and trace   pericholecystic fluid, evaluatefor acute cholecystitis.  RADIOPHARMACEUTICAL: 3 mCi Tc-99m-Mebrofenin, I.V.; 2 doses  TECHNIQUE:  Dynamic imaging of the anterior abdomen was performed for 85 minutes after injection of radiotracer followed by static images of the   abdomen inthe anterior, right lateral and right anterior oblique projections.  Morphine 4 mg I.V. and a second dose of radiotracer were administered at 1 hour.    COMPARISON: No prior hepatobiliary scan available.  FINDINGS: There is prompt, homogeneous uptake of radiotracer by the hepatocytes. Activity is first seen in the bowel at 40 minutes. The   gallbladder is visualized 15 minutes after the administration of morphine. There is good clearance of activity from the liver at the end of the study.  IMPRESSION: Normal morphine-augmented hepatobiliary scan. No evidence of acute cholecystitis.  NELIDA ERVIN M.D., NUCLEAR MEDICINE ATTENDING  This document has been electronically signed. May 13 2017  8:16PM             [facility]  _______________________________________________________________________________  Patient Name: Pearl Carson           Procedure Date: 5/17/2017 4:28 PM  MRN: [id number]                     Account Number: [id number]  YOB: 1955             Admit Type: Inpatient  Room: [place] [address]                         Gender: Female  Attending MD: LAZARO PLAZA MD        _______________________________________________________________________________     Procedure:           Upper GI endoscopy  Indications:         Generalized abdominal pain  Providers:           LAZARO PLAZA MD  Medicines:           Monitored Anesthesia Care  Complications:       No immediate complications.  Procedure:           Pre-Anesthesia Assessment:                       - Prior to the procedure, a History and Physical was                        performed, and patient medications, allergies and                        sensitivities were reviewed. The patient's tolerance of                   previous anesthesia was reviewed.                       After obtaining informed consent, the endoscope was                        passed under direct vision. Throughout the procedure,                        the patient's blood pressure, pulse, and oxygen                        saturations were monitored continuously. The was                        introduced through the mouth, and advanced to the second                        part of duodenum.                                               Findings:       LA Grade B (one or more mucosal breaks greater than 5 mm, not extending        between the tops of two mucosal folds) esophagitis with no bleeding was        found.       Diffuse moderate inflammation was found in the gastric body and in the        gastric antrum. Biopsies were taken with a cold forceps for histology.        There was evidence of bile reflux.       Moderately erythematous mucosa without active bleeding and with no    stigmata of bleeding was found in the duodenal bulb.                                                                                   Impression:          - LA Grade B reflux esophagitis.                       - Acute gastritis. Biopsied.              - Erythematous duodenopathy.  Recommendation:      - Await pathology results.                                                                                   Attending Participation:       I personally performed the entire procedure.                                                                            _________________  LAZARO PLAZA MD  5/17/2017 5:00:15 PM  Number of Addenda: 0    Note Initiated On: 5/17/2017 4:28 PM This is a morbidly obese 61- year old female who present the second time in a month with persistent vague symptoms of upper abdominal pain, nausea, vomiting, fatigue dyspnea and aches. She states she wakes "every morning" with abdominal pain and sometimes worsens with eating. Per the patient it is located in the upper abdomen. She describes the pain as dull non-radiating and constant. No alleviating factors could be elicited.  She is accompanied by her . She denies any recent trauma. The pain was without fever, chills, or, diarrhea. She has not had any recent sick exposures and denied having had foreign travel. She has not had urinary symptoms of urgency, frequency or dysuria. She is not sexually active.    Previous work-up on the admission this year was negative for Acute Cholecystitis. Including MRCP/EGD/CT Abdomen/Pelvis/RUQ US/HIDA.     MEDICAL & SURGICAL HISTORY: Asthma, CHF, hypothyroid, DM, HTN, Thyroid ca: S/P thyroidectomy  Allergies: penicillin (Rash)  Medications: Lasix, Calcitriol, Levothyroxine, sucralfate,  	lisinopril, aspirin, carvedilol, torsemide. ergocalciferol.   She does not use ethanol or tobacco.    Vital Signs T(C): 36.4, Max: 36.9 HR: 92 (90 - 94) BP: 130/94 (112/76 - 130/94) RR: 19 (18 - 22) SpO2: 98% (98% - 100%)    She was awake, alert and in no distress  She was anicteric and she did not have thrush. Her oropharyngeal mucosa was normal. She had reactive pupils and her extra-occular movements were intact. She did not have JVD. Her lungs were clear bilaterally and she had non-labored respirations. She had symmetrical chest wall movements. She had regular heart tones and she did not have a murmur or gallop. Her abdomen was obese, soft, nondistended with tenderness in the right upper quadrant and epigastrium. There were no palpable masses or abdominal wall hernias. She had active bowel sounds. She had normal external genitalia. She did not have a rash. Her extremities were well perfused. She had a normal musculoskeletal exam.    An 22 gauge IV cannula was inserted into a vein of her left upper extremity and fluid was given.     CBC Full  -  ( 07 Jun 2017 00:30 )  WBC Count : 8.74 K/uL  Hemoglobin : 13.1 g/dL  Hematocrit : 40.8 %  Platelet Count - Automated : 331 K/uL  Mean Cell Volume : 79.4 fL  Mean Cell Hemoglobin : 25.5 pg  Mean Cell Hemoglobin Concentration : 32.1 %  Auto Neutrophil # : 6.51 K/uL  Auto Lymphocyte # : 1.66 K/uL  Auto Monocyte # : 0.44 K/uL  Auto Eosinophil # : 0.05 K/uL  Auto Basophil # : 0.04 K/uL  Auto Neutrophil % : 74.4 %  Auto Lymphocyte % : 19.0 %  Auto Monocyte % : 5.0 %  Auto Eosinophil % : 0.6 %  Auto Basophil % : 0.5 %    06-08    133<L>  |  89<L>  |  19  ----------------------------<  222<H>  5.0   |  22  |  1.22    Ca    6.0<LL>      08 Jun 2017 00:30    TPro  8.0  /  Alb  4.2  /  TBili  0.7  /  DBili  x   /  AST  86<H>  /  ALT  75<H>  /  AlkPhos  161<H>  06-08    Radiology:   EXAM:  US ABDOMEN LIMITED    PROCEDURE DATE:  Jun 8 2017   INTERPRETATION:  CLINICAL INFORMATION: Known history of gall stones. Worsening right upper quadrant pain.  COMPARISON: Right upper quadrant sonogram dated 5/13/2017.  TECHNIQUE: Sonography of the right upper quadrant.   FINDINGS: The study is limited by excessive bowel gas.  Liver: Not well visualized. The right hepatic lobe measures 17.8 cm in length.  Bile ducts: Normal caliber. Common duct measures up to 6 mm.   Gallbladder: Not identified. Unable to evaluate for sonographic Davila sign as patient received pain medication prior to the study.  Pancreas: Obscured by bowel gas but the visualized portions of the head and body are unremarkable.  Right kidney: 10.9 cm in length. No hydronephrosis or shadowing stones.  Ascites: None.  Aorta and IVC: Within normal limits.  IMPRESSION: Limited exam examination due to excessive bowel gas. The gallbladder is not identified. No biliary ductal dilatation.  JOHANNA CONROY M.D., RADIOLOGY RESIDENT  This document has been electronically signed.  BARI BALL M.D., ATTENDING RADIOLOGIST  This document has been electronically signed. Jun 8 2017  2:42AM    Historical Exams:    Location: [place] [address] Sonographer: Max Cole  Study quality: Technically Difficult  Referring Physician: Tiara Rojas MD  Blood Pressure: 152/71 mmHg  Height: 170 cm  Weight: 107 kg  BSA: 2.2 m2  ------------------------------------------------------------------------  PROCEDURE: Transthoracic echocardiogram with 2-D, M-Mode  and complete spectral and color flow Doppler.  Verbal consent was obtained for injection of echo contrast.  Following  intravenous injection of contrast, harmonic  imaging was performed.lot#[id number]  INDICATION: Unspecified systolic (congestive) heart failure  (I50.20)  ------------------------------------------------------------------------  DIMENSIONS:  Dimensions:     Normal Values:  LA:     4.6 cm    2.0 - 4.0 cm  Ao:     2.5 cm    2.0 - 3.8 cm  SEPTUM: 0.8 cm    0.6 - 1.2 cm  PWT:    0.7 cm    0.6 - 1.1 cm  LVIDd:  6.8 cm    3.0 - 5.6 cm  LVIDs:  6.1cm    1.8 - 4.0 cm  Derived Variables:  LVMI: 99 g/m2  RWT: 0.20  Fractional short: 10 %  Ejection Fraction (Teicholtz): 22 %  ------------------------------------------------------------------------  OBSERVATIONS:  Mitral Valve: Mitral annular calcification, otherwise  normal mitral valve. Moderate mitral regurgitation.  Aortic Root: Normal aortic root.  Aortic Valve: Calcified trileaflet aortic valve with normal  opening.  Left Atrium: Moderately dilated left atrium.  LA volume  index = 46 cc/m2.  Left Ventricle: Severe global left ventricular systolic  dysfunction.  Endocardial visualization enhanced with  intravenous injection of echo contrast (Definity). Severe  left ventricular enlargement.  Right Heart: Normal right atrium. Normal right ventricular  size with decreased right ventricular systolic function.  Normal tricuspid valve.  Moderate tricuspid regurgitation.  Normal pulmonic valve.  Pericardium/PleuraNormal pericardium with no pericardial  effusion.  Hemodynamic: Estimated right ventricular systolic pressure  equals 53 mm Hg, assuming right atrial pressure equals 10  mm Hg, consistent with moderate pulmonary hypertension.  ------------------------------------------------------------------------  CONCLUSIONS:  1. Mitral annular calcification, otherwise normal mitral  valve. Moderate mitral regurgitation.  2. Moderately dilated left atrium.  LA volume index = 46  cc/m2.  3. Severe left ventricular enlargement.  4. Severe global left ventricular systolic dysfunction.  Endocardial visualization enhanced with intravenous  injection of echo contrast (Definity).  5. Normal right ventricular size with decreased right  ventricular systolic function.  6. Estimated right ventricular systolic pressure equals 53  mm Hg, assuming right atrial pressure equals 10 mm Hg,  consistent with moderate pulmonary hypertension.  *** Compared with echocardiogram of 5/22/2014, no  significant changes noted.  ------------------------------------------------------------------------  Confirmed on  5/16/2017 - 11:49:02 by Xander Tyson M.D.  ------------------------------------------------------------------------  EXAM:  MRI UPPER ABDOMEN W O C    PROCEDURE DATE:  May 15 2017   INTERPRETATION:  CLINICAL INFORMATION: Elevated liver function tests and gallbladder sludge. Evaluate for stone.  COMPARISON: CT abdomen and pelvis 5/13/2017.  PROCEDURE: MRI/MRCP was performed without intravenous contrast. Radial and 3D MRCP sequences were obtained.   FINDINGS:  LOWER CHEST: Cardiomegaly.  LIVER: Within normal limits.  BILE DUCTS: No choledocholithiasis.  GALLBLADDER: Cholelithiasis. Trace pericholecystic fluid.  SPLEEN: Within normal limits.  PANCREAS: Within normal limits.  ADRENALS: Within normal limits.  KIDNEYS/URETERS: Subcentimeter cyst in the left kidney. No hydronephrosis.  VISUALIZED PORTIONS:  BOWEL: Within normal limits.   PERITONEUM: No ascites.  VESSELS: Within normal limits.  RETROPERITONEUM: No lymphadenopathy.    ABDOMINAL WALL: Within normal limits.    IMPRESSION: Cholelithiasis. No choledocholithiasis.  POLLO OGDEN M.D., RADIOLOGY RESIDENT  This document has been electronically signed.  JOSUÉ BABB M.D., ATTENDING RADIOLOGIST  This document has been electronically signed. May 15 2017  4:26PM    EXAM:  CT ABDOMEN AND PELVIS IC    PROCEDURE DATE:  May 13 2017   INTERPRETATION:  CLINICAL INFORMATION: Right upper quadrant abdominal pain  COMPARISON: None  PROCEDURE: CT of the Abdomen and Pelvis was performed with intravenous contrast. Intravenous contrast: 90 ml Omnipaque 350. 10 ml discarded.  Oral contrast: positive contrast was administered.Sagittal and coronal reformats were performed.  FINDINGS:  LOWER CHEST: Basilar subsegmental atelectasis.  LIVER: Diffusehepatic steatosis.  BILE DUCTS: Normal caliber.  GALLBLADDER: Contracted with cholelithiasis and  trace pericholecystic fluid.  SPLEEN: Within normal limits.  PANCREAS: Within normal limits.  ADRENALS: Within normal limits.  KIDNEYS/URETERS: Withinnormal limits.  BLADDER: Within normal limits.  REPRODUCTIVE ORGANS: No adnexal masses  BOWEL: No bowel obstruction. Appendix is normal.  PERITONEUM: No ascites.  VESSELS:  Calcified and noncalcified atherosclerotic plaque is noted throughout the aorta and its branches. Retrograde enhancement of the IVC and hepatic veins suggesting tricuspid insufficiency.  RETROPERITONEUM: No lymphadenopathy.    ABDOMINAL WALL: Within normal limits.  BONES: Degenerative changes of the thoracolumbar spine.  IMPRESSION: Gallbladder is contracted with cholelithiasis and trace pericholecystic fluid. If further evaluation is needed a HIDA may be performed.  TAMIKO DEL ROSARIO M.D., RADIOLOGY RESIDENT  This document has been electronically signed.  LILLIAN MARTINEZ M.D., ATTENDING RADIOLOGIST  This document has been electronically signed. May 13 2017  3:23PM      EXAM:  NM HEPATOBILIARY IMG    PROCEDURE DATE:  May 13 2017   INTERPRETATION:  CLINICAL INFORMATION: 61-year-old female with right upper quadrant abdominal pain and CT findings of cholelithiasis and trace   pericholecystic fluid, evaluatefor acute cholecystitis.  RADIOPHARMACEUTICAL: 3 mCi Tc-99m-Mebrofenin, I.V.; 2 doses  TECHNIQUE:  Dynamic imaging of the anterior abdomen was performed for 85 minutes after injection of radiotracer followed by static images of the   abdomen inthe anterior, right lateral and right anterior oblique projections.  Morphine 4 mg I.V. and a second dose of radiotracer were administered at 1 hour.    COMPARISON: No prior hepatobiliary scan available.  FINDINGS: There is prompt, homogeneous uptake of radiotracer by the hepatocytes. Activity is first seen in the bowel at 40 minutes. The   gallbladder is visualized 15 minutes after the administration of morphine. There is good clearance of activity from the liver at the end of the study.  IMPRESSION: Normal morphine-augmented hepatobiliary scan. No evidence of acute cholecystitis.  NELIDA ERVIN M.D., NUCLEAR MEDICINE ATTENDING  This document has been electronically signed. May 13 2017  8:16PM             [facility]  _______________________________________________________________________________  Patient Name: Pearl Carson           Procedure Date: 5/17/2017 4:28 PM  MRN: [id number]                     Account Number: [id number]  YOB: 1955             Admit Type: Inpatient  Room: [place] [address]                         Gender: Female  Attending MD: LAZARO PLAZA MD        _______________________________________________________________________________     Procedure:           Upper GI endoscopy  Indications:         Generalized abdominal pain  Providers:           LAZARO PLAZA MD  Medicines:           Monitored Anesthesia Care  Complications:       No immediate complications.  Procedure:           Pre-Anesthesia Assessment:                       - Prior to the procedure, a History and Physical was                        performed, and patient medications, allergies and                        sensitivities were reviewed. The patient's tolerance of                   previous anesthesia was reviewed.                       After obtaining informed consent, the endoscope was                        passed under direct vision. Throughout the procedure,                        the patient's blood pressure, pulse, and oxygen                        saturations were monitored continuously. The was                        introduced through the mouth, and advanced to the second                        part of duodenum.                                               Findings:       LA Grade B (one or more mucosal breaks greater than 5 mm, not extending        between the tops of two mucosal folds) esophagitis with no bleeding was        found.       Diffuse moderate inflammation was found in the gastric body and in the        gastric antrum. Biopsies were taken with a cold forceps for histology.        There was evidence of bile reflux.       Moderately erythematous mucosa without active bleeding and with no    stigmata of bleeding was found in the duodenal bulb.                                                                                   Impression:          - LA Grade B reflux esophagitis.                       - Acute gastritis. Biopsied.              - Erythematous duodenopathy.  Recommendation:      - Await pathology results.                                                                                   Attending Participation:       I personally performed the entire procedure.                                                                            _________________  LAZARO PLAZA MD  5/17/2017 5:00:15 PM  Number of Addenda: 0    Note Initiated On: 5/17/2017 4:28 PM

## 2017-06-08 NOTE — CONSULT NOTE ADULT - PROBLEM SELECTOR RECOMMENDATION 9
This patient's current condition does not warrant a general surgical intervention or cholecystectomy. However, considering this patient was previously diagnosed and treated for gastritis and has evidence of bile reflux, a PPI would be warranted. Pro-motility agents can be considered in patients with bile reflux causing duodenopathy, gastritis, esophagitis. Furthermore, correcting this patient's calcium to a normal level might help symptomatology.

## 2017-06-08 NOTE — CONSULT NOTE ADULT - ASSESSMENT
This patient is assessed as being a 61-year-old female with a contracted gallbladder and cholelithiasis as well as a constellation of symptoms more likely related to both her cardiac failure (EF 22% 5/17/17)/backward failure resulting in her dyspnea, fatigue, nausea, vomiting, right-sided abdominal pain (liver congestion/ persistent transaminitis), bloating, and, her persistent hypocalcemia which can also worsen her symptoms of dyspnea, fatigue, and abdominal pain.

## 2017-06-08 NOTE — ED PROVIDER NOTE - ATTENDING CONTRIBUTION TO CARE
I, Xenia Okeefe M.D. have examined the patient and confirmed the essential components of the history, physical examination, diagnosis, and treatment plan. I agree with the patient's care as documented by the resident and amended herein by me. See note above for complete details of service.  62 yo F Hx HTN, CHF, DM, Thyroid CA s/p resection who was recently presented  with upper abdominal pain associated with NV, dyspnea and transaminitis. Pt was admitted and had evidence of cholelithiasis on MRCP but no evidence of acute cholecystitis or choledocholithiasis and  EGD with esophagitis and gastritis. Pt states that symptoms returned and she is having bloating, epigastric discomfort, and sob. Symptoms are worsened with food. Pt has been using Torsemide for CHF but notes dyspnea not improved, exacerbated by abdominal pain. Denies chest pain, fever, chills or other complaints. Plan - labs, EKG, supportive measures/symptom control, CXR, repeat RUQ US. Reassess. I, Xenia Okeefe M.D. have examined the patient and confirmed the essential components of the history, physical examination, diagnosis, and treatment plan. I agree with the patient's care as documented by the resident and amended herein by me. See note above for complete details of service.  60 yo F Hx HTN, CHF, DM, Thyroid CA s/p resection who was recently presented  with upper abdominal pain associated with NV, dyspnea and transaminitis. Pt was admitted and had evidence of cholelithiasis on MRCP but no evidence of acute cholecystitis or choledocholithiasis and  EGD with esophagitis and gastritis. Pt states that symptoms returned and she is having bloating, epigastric discomfort, and sob. Symptoms are worsened with food. Pt has been using Torsemide for CHF but notes dyspnea not improved, exacerbated by abdominal pain. Denies chest pain, fever, chills or other complaints. Plan - labs, EKG, supportive measures/symptom control, CXR, repeat RUQ US. Reassess. Pt has a follow up appointment tomorrow.

## 2017-06-08 NOTE — ED ADULT NURSE NOTE - OBJECTIVE STATEMENT
61 years old female presents with complaints of nausea, vomiting, sharp epigastric pain accompanied with shortness of breath. Patient states that she was admitted one month ago with same issues. On arrival, patient is alert and oriented x 4, lungs are clear bilaterally, 100% on room air, normal sinus rhythm on cardiac monitor,  abdomen obese, soft but mid upper abdomen tender to palpation, +1 edema noted bilaterally, 20 gauge saline lock inserted on right AC, blood drawn and sent. Will follow up and monitor.

## 2017-06-08 NOTE — ED PROVIDER NOTE - ABDOMINAL TENDER
diffuse tenderness w/o rebound/guarding but reports worst in band like distribution across upper abdomen

## 2017-06-08 NOTE — ED ADULT NURSE NOTE - CHPI ED SYMPTOMS NEG
no dysuria/no blood in stool/no chills/no abdominal distension/no fever/no diarrhea/no hematuria/no burning urination

## 2017-06-09 ENCOUNTER — EMERGENCY (EMERGENCY)
Facility: HOSPITAL | Age: 62
LOS: 1 days | Discharge: HOME | End: 2017-06-09
Attending: EMERGENCY MEDICINE | Admitting: EMERGENCY MEDICINE
Payer: COMMERCIAL

## 2017-06-09 VITALS
OXYGEN SATURATION: 99 % | RESPIRATION RATE: 16 BRPM | TEMPERATURE: 97 F | HEART RATE: 88 BPM | SYSTOLIC BLOOD PRESSURE: 117 MMHG | DIASTOLIC BLOOD PRESSURE: 86 MMHG

## 2017-06-09 PROCEDURE — 99285 EMERGENCY DEPT VISIT HI MDM: CPT | Mod: 25

## 2017-06-09 NOTE — ED ADULT TRIAGE NOTE - CHIEF COMPLAINT QUOTE
Pt st" I have abd pain and vomiting....I was seen here on the 13th for the same thing they found some kind of ulcer in stomach started on pepcid but does not work...I was here yesterday and discharged ." Pt st" I have abd pain and vomiting....I was seen here on the 13th for the same thing they found some kind of ulcer in stomach started on pepcid but does not work...I was here yesterday and discharged .""I have gallstones don't know if I have any new stones."  Hx of DM, CHF.

## 2017-06-10 VITALS
SYSTOLIC BLOOD PRESSURE: 107 MMHG | OXYGEN SATURATION: 100 % | HEART RATE: 85 BPM | DIASTOLIC BLOOD PRESSURE: 62 MMHG | RESPIRATION RATE: 16 BRPM

## 2017-06-10 LAB
ALBUMIN SERPL ELPH-MCNC: 4.3 G/DL — SIGNIFICANT CHANGE UP (ref 3.3–5)
ALP SERPL-CCNC: 179 U/L — HIGH (ref 40–120)
ALT FLD-CCNC: 103 U/L — HIGH (ref 4–33)
AST SERPL-CCNC: 75 U/L — HIGH (ref 4–32)
BASE EXCESS BLDV CALC-SCNC: 2.4 MMOL/L — SIGNIFICANT CHANGE UP
BASOPHILS # BLD AUTO: 0.05 K/UL — SIGNIFICANT CHANGE UP (ref 0–0.2)
BASOPHILS NFR BLD AUTO: 0.5 % — SIGNIFICANT CHANGE UP (ref 0–2)
BILIRUB SERPL-MCNC: 1.7 MG/DL — HIGH (ref 0.2–1.2)
BLOOD GAS VENOUS - CREATININE: 1.23 MG/DL — SIGNIFICANT CHANGE UP (ref 0.5–1.3)
BUN SERPL-MCNC: 24 MG/DL — HIGH (ref 7–23)
CALCIUM SERPL-MCNC: 5.7 MG/DL — CRITICAL LOW (ref 8.4–10.5)
CHLORIDE BLDV-SCNC: 89 MMOL/L — LOW (ref 96–108)
CHLORIDE SERPL-SCNC: 86 MMOL/L — LOW (ref 98–107)
CO2 SERPL-SCNC: 22 MMOL/L — SIGNIFICANT CHANGE UP (ref 22–31)
CREAT SERPL-MCNC: 1.36 MG/DL — HIGH (ref 0.5–1.3)
EOSINOPHIL # BLD AUTO: 0.04 K/UL — SIGNIFICANT CHANGE UP (ref 0–0.5)
EOSINOPHIL NFR BLD AUTO: 0.4 % — SIGNIFICANT CHANGE UP (ref 0–6)
GAS PNL BLDV: 130 MMOL/L — LOW (ref 136–146)
GLUCOSE BLDV-MCNC: 176 — HIGH (ref 70–99)
GLUCOSE SERPL-MCNC: 180 MG/DL — HIGH (ref 70–99)
HCO3 BLDV-SCNC: 25 MMOL/L — SIGNIFICANT CHANGE UP (ref 20–27)
HCT VFR BLD CALC: 38.1 % — SIGNIFICANT CHANGE UP (ref 34.5–45)
HCT VFR BLDV CALC: 40 % — SIGNIFICANT CHANGE UP (ref 34.5–45)
HGB BLD-MCNC: 12.7 G/DL — SIGNIFICANT CHANGE UP (ref 11.5–15.5)
HGB BLDV-MCNC: 13 G/DL — SIGNIFICANT CHANGE UP (ref 11.5–15.5)
IMM GRANULOCYTES NFR BLD AUTO: 0.5 % — SIGNIFICANT CHANGE UP (ref 0–1.5)
LACTATE BLDV-MCNC: 3.8 MMOL/L — HIGH (ref 0.5–2)
LIDOCAIN IGE QN: 27.4 U/L — SIGNIFICANT CHANGE UP (ref 7–60)
LYMPHOCYTES # BLD AUTO: 2.57 K/UL — SIGNIFICANT CHANGE UP (ref 1–3.3)
LYMPHOCYTES # BLD AUTO: 23.5 % — SIGNIFICANT CHANGE UP (ref 13–44)
MCHC RBC-ENTMCNC: 25.7 PG — LOW (ref 27–34)
MCHC RBC-ENTMCNC: 33.3 % — SIGNIFICANT CHANGE UP (ref 32–36)
MCV RBC AUTO: 77 FL — LOW (ref 80–100)
MONOCYTES # BLD AUTO: 1.05 K/UL — HIGH (ref 0–0.9)
MONOCYTES NFR BLD AUTO: 9.6 % — SIGNIFICANT CHANGE UP (ref 2–14)
NEUTROPHILS # BLD AUTO: 7.17 K/UL — SIGNIFICANT CHANGE UP (ref 1.8–7.4)
NEUTROPHILS NFR BLD AUTO: 65.5 % — SIGNIFICANT CHANGE UP (ref 43–77)
PCO2 BLDV: 41 MMHG — SIGNIFICANT CHANGE UP (ref 41–51)
PH BLDV: 7.42 PH — SIGNIFICANT CHANGE UP (ref 7.32–7.43)
PLATELET # BLD AUTO: 319 K/UL — SIGNIFICANT CHANGE UP (ref 150–400)
PMV BLD: 11 FL — SIGNIFICANT CHANGE UP (ref 7–13)
PO2 BLDV: 30 MMHG — LOW (ref 35–40)
POTASSIUM BLDV-SCNC: 3.9 MMOL/L — SIGNIFICANT CHANGE UP (ref 3.4–4.5)
POTASSIUM SERPL-MCNC: 4 MMOL/L — SIGNIFICANT CHANGE UP (ref 3.5–5.3)
POTASSIUM SERPL-SCNC: 4 MMOL/L — SIGNIFICANT CHANGE UP (ref 3.5–5.3)
PROT SERPL-MCNC: 7.7 G/DL — SIGNIFICANT CHANGE UP (ref 6–8.3)
RBC # BLD: 4.95 M/UL — SIGNIFICANT CHANGE UP (ref 3.8–5.2)
RBC # FLD: 16.1 % — HIGH (ref 10.3–14.5)
SAO2 % BLDV: 48.2 % — LOW (ref 60–85)
SODIUM SERPL-SCNC: 133 MMOL/L — LOW (ref 135–145)
WBC # BLD: 10.94 K/UL — HIGH (ref 3.8–10.5)
WBC # FLD AUTO: 10.94 K/UL — HIGH (ref 3.8–10.5)

## 2017-06-10 PROCEDURE — 74177 CT ABD & PELVIS W/CONTRAST: CPT | Mod: 26

## 2017-06-10 RX ORDER — SODIUM CHLORIDE 9 MG/ML
1000 INJECTION INTRAMUSCULAR; INTRAVENOUS; SUBCUTANEOUS ONCE
Qty: 0 | Refills: 0 | Status: COMPLETED | OUTPATIENT
Start: 2017-06-10 | End: 2017-06-10

## 2017-06-10 RX ORDER — METOCLOPRAMIDE HCL 10 MG
10 TABLET ORAL ONCE
Qty: 0 | Refills: 0 | Status: COMPLETED | OUTPATIENT
Start: 2017-06-10 | End: 2017-06-10

## 2017-06-10 RX ORDER — FAMOTIDINE 10 MG/ML
20 INJECTION INTRAVENOUS ONCE
Qty: 0 | Refills: 0 | Status: COMPLETED | OUTPATIENT
Start: 2017-06-10 | End: 2017-06-10

## 2017-06-10 RX ORDER — SODIUM CHLORIDE 9 MG/ML
1000 INJECTION INTRAMUSCULAR; INTRAVENOUS; SUBCUTANEOUS ONCE
Qty: 0 | Refills: 0 | Status: DISCONTINUED | OUTPATIENT
Start: 2017-06-10 | End: 2017-06-10

## 2017-06-10 RX ORDER — ONDANSETRON 8 MG/1
4 TABLET, FILM COATED ORAL ONCE
Qty: 0 | Refills: 0 | Status: COMPLETED | OUTPATIENT
Start: 2017-06-10 | End: 2017-06-10

## 2017-06-10 RX ORDER — ONDANSETRON 8 MG/1
1 TABLET, FILM COATED ORAL
Qty: 30 | Refills: 0 | OUTPATIENT
Start: 2017-06-10 | End: 2017-06-20

## 2017-06-10 RX ORDER — CALCIUM GLUCONATE 100 MG/ML
1 VIAL (ML) INTRAVENOUS ONCE
Qty: 0 | Refills: 0 | Status: COMPLETED | OUTPATIENT
Start: 2017-06-10 | End: 2017-06-10

## 2017-06-10 RX ADMIN — ONDANSETRON 4 MILLIGRAM(S): 8 TABLET, FILM COATED ORAL at 02:04

## 2017-06-10 RX ADMIN — Medication 30 MILLILITER(S): at 02:03

## 2017-06-10 RX ADMIN — Medication 10 MILLIGRAM(S): at 03:31

## 2017-06-10 RX ADMIN — SODIUM CHLORIDE 1000 MILLILITER(S): 9 INJECTION INTRAMUSCULAR; INTRAVENOUS; SUBCUTANEOUS at 02:04

## 2017-06-10 RX ADMIN — Medication 100 GRAM(S): at 03:31

## 2017-06-10 RX ADMIN — FAMOTIDINE 20 MILLIGRAM(S): 10 INJECTION INTRAVENOUS at 02:04

## 2017-06-10 NOTE — ED PROVIDER NOTE - OBJECTIVE STATEMENT
61F hx of CHF, DM, HTN, HLD, thyroid CA s/p resection presents with n/v and abd pain. Pt was seen in ED yesterday for same reason and d/c home with pepcid. Pt still has nausea and vomiting and doesn't eat well and so came back to ED. Pt wants a GI doctor to see outpatient.

## 2017-06-10 NOTE — ED ADULT NURSE NOTE - CHIEF COMPLAINT QUOTE
Pt st" I have abd pain and vomiting....I was seen here on the 13th for the same thing they found some kind of ulcer in stomach started on pepcid but does not work...I was here yesterday and discharged .""I have gallstones don't know if I have any new stones."  Hx of DM, CHF.

## 2017-06-10 NOTE — ED ADULT NURSE NOTE - OBJECTIVE STATEMENT
Pt received into trauma room B CO Epigastric ABD pain nausea and vomiting today. Pt A&Ox4, appears uncomfortable.· H Pt was seen in ED yesterday for same reason and d/c home with pepcid. Pt still has nausea and vomiting and doesn't eat well and so came back to ED. Pt states she wants a GI doctor to see outpatient. Pt denies CP, SOB, pain radiation, blood in vomit. Md evaluated, VS as noted. 20 G IV inserted into L Ac, labs sent. Medicated as ordered. Family at bedside, will continue to monitor for safety and comofort.

## 2017-06-10 NOTE — ED PROVIDER NOTE - ATTENDING CONTRIBUTION TO CARE
JODEE BUNCH MD: 62 yo F with a past medical history of CHF, DM, Hypertension, hyperlipidemia, thyroid CA s/p resection presents with abd pain and N/V. Pt was seen in ED yesterday for the same symptoms and after feeling better, discharged home with Pepcid.  Patient would like a gastroenterologist to see as an outpatient.  JULIO C ZAMUDIO NOTE:  Patient is awake and alert and in no acute distress.  Normocephalic/atraumatic.  Auricles are normal.  Neck supple.  Lungs CTAB, no wheeze, no rhonchi,  no rales.  Heart is regular rate and rhythm.  Abdomen is soft, not distended mild diffuse tenderness to palpation, No R/G, +BS.  Back is nontender, no CVAT.  Moving all 4 extremities.   Neurologically grossly intact.  Affect is appropriate.   DR. LUCIANO, ATTENDING MD-  I performed a face to face bedside interview with patient regarding history of present illness, review of symptoms and past medical history. I completed an independent physical exam.  I have discussed patient's plan of care with Dr. Wilson.   I agree with note as stated above, having amended the EMR as needed to reflect my findings. I have discussed the assessment and plan of care.  This includes during the time I functioned as the attending physician for this patient.

## 2017-06-11 ENCOUNTER — INPATIENT (INPATIENT)
Facility: HOSPITAL | Age: 62
LOS: 2 days | Discharge: LEFT BEFORE DISCHARGE | End: 2017-06-14
Attending: INTERNAL MEDICINE | Admitting: INTERNAL MEDICINE
Payer: COMMERCIAL

## 2017-06-11 VITALS
TEMPERATURE: 99 F | DIASTOLIC BLOOD PRESSURE: 77 MMHG | RESPIRATION RATE: 16 BRPM | OXYGEN SATURATION: 100 % | HEART RATE: 88 BPM | SYSTOLIC BLOOD PRESSURE: 119 MMHG

## 2017-06-11 DIAGNOSIS — I50.9 HEART FAILURE, UNSPECIFIED: ICD-10-CM

## 2017-06-11 DIAGNOSIS — Z29.9 ENCOUNTER FOR PROPHYLACTIC MEASURES, UNSPECIFIED: ICD-10-CM

## 2017-06-11 DIAGNOSIS — N17.9 ACUTE KIDNEY FAILURE, UNSPECIFIED: ICD-10-CM

## 2017-06-11 DIAGNOSIS — R74.0 NONSPECIFIC ELEVATION OF LEVELS OF TRANSAMINASE AND LACTIC ACID DEHYDROGENASE [LDH]: ICD-10-CM

## 2017-06-11 DIAGNOSIS — R10.9 UNSPECIFIED ABDOMINAL PAIN: ICD-10-CM

## 2017-06-11 DIAGNOSIS — E87.1 HYPO-OSMOLALITY AND HYPONATREMIA: ICD-10-CM

## 2017-06-11 DIAGNOSIS — E11.9 TYPE 2 DIABETES MELLITUS WITHOUT COMPLICATIONS: ICD-10-CM

## 2017-06-11 DIAGNOSIS — I10 ESSENTIAL (PRIMARY) HYPERTENSION: ICD-10-CM

## 2017-06-11 DIAGNOSIS — R10.13 EPIGASTRIC PAIN: ICD-10-CM

## 2017-06-11 DIAGNOSIS — E83.51 HYPOCALCEMIA: ICD-10-CM

## 2017-06-11 LAB
ALBUMIN SERPL ELPH-MCNC: 4.1 G/DL — SIGNIFICANT CHANGE UP (ref 3.3–5)
ALBUMIN SERPL ELPH-MCNC: 4.2 G/DL — SIGNIFICANT CHANGE UP (ref 3.3–5)
ALP SERPL-CCNC: 209 U/L — HIGH (ref 40–120)
ALP SERPL-CCNC: 212 U/L — HIGH (ref 40–120)
ALT FLD-CCNC: 127 U/L — HIGH (ref 4–33)
ALT FLD-CCNC: 153 U/L — HIGH (ref 4–33)
AST SERPL-CCNC: 126 U/L — HIGH (ref 4–32)
AST SERPL-CCNC: 99 U/L — HIGH (ref 4–32)
BASE EXCESS BLDV CALC-SCNC: 3.8 MMOL/L — SIGNIFICANT CHANGE UP
BASOPHILS # BLD AUTO: 0.04 K/UL — SIGNIFICANT CHANGE UP (ref 0–0.2)
BASOPHILS NFR BLD AUTO: 0.3 % — SIGNIFICANT CHANGE UP (ref 0–2)
BILIRUB SERPL-MCNC: 1.7 MG/DL — HIGH (ref 0.2–1.2)
BILIRUB SERPL-MCNC: 2 MG/DL — HIGH (ref 0.2–1.2)
BLOOD GAS VENOUS - CREATININE: 1.41 MG/DL — HIGH (ref 0.5–1.3)
BUN SERPL-MCNC: 24 MG/DL — HIGH (ref 7–23)
BUN SERPL-MCNC: 24 MG/DL — HIGH (ref 7–23)
CALCIUM SERPL-MCNC: 5.6 MG/DL — CRITICAL LOW (ref 8.4–10.5)
CALCIUM SERPL-MCNC: 5.7 MG/DL — CRITICAL LOW (ref 8.4–10.5)
CHLORIDE BLDV-SCNC: 86 MMOL/L — LOW (ref 96–108)
CHLORIDE SERPL-SCNC: 84 MMOL/L — LOW (ref 98–107)
CHLORIDE SERPL-SCNC: 84 MMOL/L — LOW (ref 98–107)
CHLORIDE UR-SCNC: 4 MMOL/L — SIGNIFICANT CHANGE UP
CK MB BLD-MCNC: 0.6 — SIGNIFICANT CHANGE UP (ref 0–2.5)
CK MB BLD-MCNC: 5.66 NG/ML — HIGH (ref 1–4.7)
CK SERPL-CCNC: 856 U/L — HIGH (ref 25–170)
CK SERPL-CCNC: 928 U/L — HIGH (ref 25–170)
CO2 SERPL-SCNC: 21 MMOL/L — LOW (ref 22–31)
CO2 SERPL-SCNC: 24 MMOL/L — SIGNIFICANT CHANGE UP (ref 22–31)
CREAT SERPL-MCNC: 1.44 MG/DL — HIGH (ref 0.5–1.3)
CREAT SERPL-MCNC: 1.59 MG/DL — HIGH (ref 0.5–1.3)
EOSINOPHIL # BLD AUTO: 0.04 K/UL — SIGNIFICANT CHANGE UP (ref 0–0.5)
EOSINOPHIL NFR BLD AUTO: 0.3 % — SIGNIFICANT CHANGE UP (ref 0–6)
GAS PNL BLDV: 124 MMOL/L — LOW (ref 136–146)
GLUCOSE BLDV-MCNC: 194 — HIGH (ref 70–99)
GLUCOSE SERPL-MCNC: 167 MG/DL — HIGH (ref 70–99)
GLUCOSE SERPL-MCNC: 205 MG/DL — HIGH (ref 70–99)
HCO3 BLDV-SCNC: 26 MMOL/L — SIGNIFICANT CHANGE UP (ref 20–27)
HCT VFR BLD CALC: 37.4 % — SIGNIFICANT CHANGE UP (ref 34.5–45)
HCT VFR BLD CALC: 39.3 % — SIGNIFICANT CHANGE UP (ref 34.5–45)
HCT VFR BLDV CALC: 40.7 % — SIGNIFICANT CHANGE UP (ref 34.5–45)
HGB BLD-MCNC: 12.3 G/DL — SIGNIFICANT CHANGE UP (ref 11.5–15.5)
HGB BLD-MCNC: 13 G/DL — SIGNIFICANT CHANGE UP (ref 11.5–15.5)
HGB BLDV-MCNC: 13.2 G/DL — SIGNIFICANT CHANGE UP (ref 11.5–15.5)
IMM GRANULOCYTES NFR BLD AUTO: 0.6 % — SIGNIFICANT CHANGE UP (ref 0–1.5)
LACTATE BLDV-MCNC: 2.8 MMOL/L — HIGH (ref 0.5–2)
LIDOCAIN IGE QN: 40.2 U/L — SIGNIFICANT CHANGE UP (ref 7–60)
LYMPHOCYTES # BLD AUTO: 2.82 K/UL — SIGNIFICANT CHANGE UP (ref 1–3.3)
LYMPHOCYTES # BLD AUTO: 24.4 % — SIGNIFICANT CHANGE UP (ref 13–44)
MAGNESIUM SERPL-MCNC: 1.6 MG/DL — SIGNIFICANT CHANGE UP (ref 1.6–2.6)
MAGNESIUM SERPL-MCNC: 1.6 MG/DL — SIGNIFICANT CHANGE UP (ref 1.6–2.6)
MCHC RBC-ENTMCNC: 25.2 PG — LOW (ref 27–34)
MCHC RBC-ENTMCNC: 25.5 PG — LOW (ref 27–34)
MCHC RBC-ENTMCNC: 32.9 % — SIGNIFICANT CHANGE UP (ref 32–36)
MCHC RBC-ENTMCNC: 33.1 % — SIGNIFICANT CHANGE UP (ref 32–36)
MCV RBC AUTO: 76.5 FL — LOW (ref 80–100)
MCV RBC AUTO: 77.1 FL — LOW (ref 80–100)
MONOCYTES # BLD AUTO: 1.2 K/UL — HIGH (ref 0–0.9)
MONOCYTES NFR BLD AUTO: 10.4 % — SIGNIFICANT CHANGE UP (ref 2–14)
NEUTROPHILS # BLD AUTO: 7.41 K/UL — HIGH (ref 1.8–7.4)
NEUTROPHILS NFR BLD AUTO: 64 % — SIGNIFICANT CHANGE UP (ref 43–77)
OSMOLALITY UR: 432 MOSMO/KG — SIGNIFICANT CHANGE UP (ref 50–1200)
PCO2 BLDV: 47 MMHG — SIGNIFICANT CHANGE UP (ref 41–51)
PH BLDV: 7.4 PH — SIGNIFICANT CHANGE UP (ref 7.32–7.43)
PHOSPHATE SERPL-MCNC: 5.2 MG/DL — HIGH (ref 2.5–4.5)
PHOSPHATE SERPL-MCNC: 5.5 MG/DL — HIGH (ref 2.5–4.5)
PLATELET # BLD AUTO: 313 K/UL — SIGNIFICANT CHANGE UP (ref 150–400)
PLATELET # BLD AUTO: 327 K/UL — SIGNIFICANT CHANGE UP (ref 150–400)
PMV BLD: 11.1 FL — SIGNIFICANT CHANGE UP (ref 7–13)
PMV BLD: 11.2 FL — SIGNIFICANT CHANGE UP (ref 7–13)
PO2 BLDV: 26 MMHG — LOW (ref 35–40)
POTASSIUM BLDV-SCNC: 3.7 MMOL/L — SIGNIFICANT CHANGE UP (ref 3.4–4.5)
POTASSIUM SERPL-MCNC: 4.1 MMOL/L — SIGNIFICANT CHANGE UP (ref 3.5–5.3)
POTASSIUM SERPL-MCNC: 4.2 MMOL/L — SIGNIFICANT CHANGE UP (ref 3.5–5.3)
POTASSIUM SERPL-SCNC: 4.1 MMOL/L — SIGNIFICANT CHANGE UP (ref 3.5–5.3)
POTASSIUM SERPL-SCNC: 4.2 MMOL/L — SIGNIFICANT CHANGE UP (ref 3.5–5.3)
POTASSIUM UR-SCNC: 32.1 MEQ/L — SIGNIFICANT CHANGE UP
PROT SERPL-MCNC: 7.4 G/DL — SIGNIFICANT CHANGE UP (ref 6–8.3)
PROT SERPL-MCNC: 7.5 G/DL — SIGNIFICANT CHANGE UP (ref 6–8.3)
RBC # BLD: 4.89 M/UL — SIGNIFICANT CHANGE UP (ref 3.8–5.2)
RBC # BLD: 5.1 M/UL — SIGNIFICANT CHANGE UP (ref 3.8–5.2)
RBC # FLD: 16.2 % — HIGH (ref 10.3–14.5)
RBC # FLD: 16.2 % — HIGH (ref 10.3–14.5)
SAO2 % BLDV: 37.3 % — LOW (ref 60–85)
SODIUM SERPL-SCNC: 127 MMOL/L — LOW (ref 135–145)
SODIUM SERPL-SCNC: 129 MMOL/L — LOW (ref 135–145)
SODIUM UR-SCNC: 3 MEQ/L — SIGNIFICANT CHANGE UP
TROPONIN T SERPL-MCNC: < 0.06 NG/ML — SIGNIFICANT CHANGE UP (ref 0–0.06)
TROPONIN T SERPL-MCNC: < 0.06 NG/ML — SIGNIFICANT CHANGE UP (ref 0–0.06)
WBC # BLD: 10.08 K/UL — SIGNIFICANT CHANGE UP (ref 3.8–10.5)
WBC # BLD: 11.58 K/UL — HIGH (ref 3.8–10.5)
WBC # FLD AUTO: 10.08 K/UL — SIGNIFICANT CHANGE UP (ref 3.8–10.5)
WBC # FLD AUTO: 11.58 K/UL — HIGH (ref 3.8–10.5)

## 2017-06-11 PROCEDURE — 71010: CPT | Mod: 26

## 2017-06-11 PROCEDURE — 76705 ECHO EXAM OF ABDOMEN: CPT | Mod: 26

## 2017-06-11 RX ORDER — DEXTROSE 50 % IN WATER 50 %
1 SYRINGE (ML) INTRAVENOUS ONCE
Qty: 0 | Refills: 0 | Status: DISCONTINUED | OUTPATIENT
Start: 2017-06-11 | End: 2017-06-14

## 2017-06-11 RX ORDER — CARVEDILOL PHOSPHATE 80 MG/1
3.12 CAPSULE, EXTENDED RELEASE ORAL EVERY 12 HOURS
Qty: 0 | Refills: 0 | Status: DISCONTINUED | OUTPATIENT
Start: 2017-06-11 | End: 2017-06-14

## 2017-06-11 RX ORDER — CALCITRIOL 0.5 UG/1
0.5 CAPSULE ORAL DAILY
Qty: 0 | Refills: 0 | Status: DISCONTINUED | OUTPATIENT
Start: 2017-06-11 | End: 2017-06-11

## 2017-06-11 RX ORDER — SODIUM CHLORIDE 9 MG/ML
1000 INJECTION, SOLUTION INTRAVENOUS
Qty: 0 | Refills: 0 | Status: DISCONTINUED | OUTPATIENT
Start: 2017-06-11 | End: 2017-06-14

## 2017-06-11 RX ORDER — CALCITRIOL 0.5 UG/1
0.5 CAPSULE ORAL
Qty: 0 | Refills: 0 | Status: DISCONTINUED | OUTPATIENT
Start: 2017-06-11 | End: 2017-06-14

## 2017-06-11 RX ORDER — GLUCAGON INJECTION, SOLUTION 0.5 MG/.1ML
1 INJECTION, SOLUTION SUBCUTANEOUS ONCE
Qty: 0 | Refills: 0 | Status: DISCONTINUED | OUTPATIENT
Start: 2017-06-11 | End: 2017-06-14

## 2017-06-11 RX ORDER — MORPHINE SULFATE 50 MG/1
4 CAPSULE, EXTENDED RELEASE ORAL ONCE
Qty: 0 | Refills: 0 | Status: DISCONTINUED | OUTPATIENT
Start: 2017-06-11 | End: 2017-06-11

## 2017-06-11 RX ORDER — ONDANSETRON 8 MG/1
8 TABLET, FILM COATED ORAL ONCE
Qty: 0 | Refills: 0 | Status: COMPLETED | OUTPATIENT
Start: 2017-06-11 | End: 2017-06-11

## 2017-06-11 RX ORDER — FAMOTIDINE 10 MG/ML
20 INJECTION INTRAVENOUS ONCE
Qty: 0 | Refills: 0 | Status: COMPLETED | OUTPATIENT
Start: 2017-06-11 | End: 2017-06-11

## 2017-06-11 RX ORDER — LEVOTHYROXINE SODIUM 125 MCG
200 TABLET ORAL DAILY
Qty: 0 | Refills: 0 | Status: DISCONTINUED | OUTPATIENT
Start: 2017-06-11 | End: 2017-06-14

## 2017-06-11 RX ORDER — SODIUM CHLORIDE 9 MG/ML
500 INJECTION INTRAMUSCULAR; INTRAVENOUS; SUBCUTANEOUS ONCE
Qty: 0 | Refills: 0 | Status: COMPLETED | OUTPATIENT
Start: 2017-06-11 | End: 2017-06-11

## 2017-06-11 RX ORDER — CALCIUM GLUCONATE 100 MG/ML
1 VIAL (ML) INTRAVENOUS ONCE
Qty: 0 | Refills: 0 | Status: COMPLETED | OUTPATIENT
Start: 2017-06-11 | End: 2017-06-11

## 2017-06-11 RX ORDER — SODIUM CHLORIDE 9 MG/ML
1000 INJECTION, SOLUTION INTRAVENOUS
Qty: 0 | Refills: 0 | Status: DISCONTINUED | OUTPATIENT
Start: 2017-06-11 | End: 2017-06-12

## 2017-06-11 RX ORDER — INSULIN LISPRO 100/ML
VIAL (ML) SUBCUTANEOUS
Qty: 0 | Refills: 0 | Status: DISCONTINUED | OUTPATIENT
Start: 2017-06-11 | End: 2017-06-14

## 2017-06-11 RX ORDER — HEPARIN SODIUM 5000 [USP'U]/ML
5000 INJECTION INTRAVENOUS; SUBCUTANEOUS EVERY 8 HOURS
Qty: 0 | Refills: 0 | Status: DISCONTINUED | OUTPATIENT
Start: 2017-06-11 | End: 2017-06-14

## 2017-06-11 RX ORDER — DEXTROSE 50 % IN WATER 50 %
25 SYRINGE (ML) INTRAVENOUS ONCE
Qty: 0 | Refills: 0 | Status: DISCONTINUED | OUTPATIENT
Start: 2017-06-11 | End: 2017-06-14

## 2017-06-11 RX ORDER — ERGOCALCIFEROL 1.25 MG/1
50000 CAPSULE ORAL
Qty: 0 | Refills: 0 | Status: DISCONTINUED | OUTPATIENT
Start: 2017-06-11 | End: 2017-06-14

## 2017-06-11 RX ORDER — DEXTROSE 50 % IN WATER 50 %
12.5 SYRINGE (ML) INTRAVENOUS ONCE
Qty: 0 | Refills: 0 | Status: DISCONTINUED | OUTPATIENT
Start: 2017-06-11 | End: 2017-06-14

## 2017-06-11 RX ORDER — METOCLOPRAMIDE HCL 10 MG
10 TABLET ORAL ONCE
Qty: 0 | Refills: 0 | Status: COMPLETED | OUTPATIENT
Start: 2017-06-11 | End: 2017-06-11

## 2017-06-11 RX ORDER — ASPIRIN/CALCIUM CARB/MAGNESIUM 324 MG
81 TABLET ORAL DAILY
Qty: 0 | Refills: 0 | Status: DISCONTINUED | OUTPATIENT
Start: 2017-06-11 | End: 2017-06-14

## 2017-06-11 RX ORDER — SUCRALFATE 1 G
1 TABLET ORAL
Qty: 0 | Refills: 0 | Status: DISCONTINUED | OUTPATIENT
Start: 2017-06-11 | End: 2017-06-14

## 2017-06-11 RX ORDER — CALCIUM CARBONATE 500(1250)
1 TABLET ORAL
Qty: 0 | Refills: 0 | Status: DISCONTINUED | OUTPATIENT
Start: 2017-06-11 | End: 2017-06-14

## 2017-06-11 RX ORDER — METOCLOPRAMIDE HCL 10 MG
10 TABLET ORAL EVERY 6 HOURS
Qty: 0 | Refills: 0 | Status: DISCONTINUED | OUTPATIENT
Start: 2017-06-11 | End: 2017-06-14

## 2017-06-11 RX ADMIN — SODIUM CHLORIDE 50 MILLILITER(S): 9 INJECTION, SOLUTION INTRAVENOUS at 14:25

## 2017-06-11 RX ADMIN — Medication 81 MILLIGRAM(S): at 14:24

## 2017-06-11 RX ADMIN — SODIUM CHLORIDE 500 MILLILITER(S): 9 INJECTION INTRAMUSCULAR; INTRAVENOUS; SUBCUTANEOUS at 13:00

## 2017-06-11 RX ADMIN — HEPARIN SODIUM 5000 UNIT(S): 5000 INJECTION INTRAVENOUS; SUBCUTANEOUS at 21:53

## 2017-06-11 RX ADMIN — Medication 1: at 17:49

## 2017-06-11 RX ADMIN — FAMOTIDINE 20 MILLIGRAM(S): 10 INJECTION INTRAVENOUS at 07:59

## 2017-06-11 RX ADMIN — MORPHINE SULFATE 4 MILLIGRAM(S): 50 CAPSULE, EXTENDED RELEASE ORAL at 05:05

## 2017-06-11 RX ADMIN — ERGOCALCIFEROL 50000 UNIT(S): 1.25 CAPSULE ORAL at 14:24

## 2017-06-11 RX ADMIN — HEPARIN SODIUM 5000 UNIT(S): 5000 INJECTION INTRAVENOUS; SUBCUTANEOUS at 14:24

## 2017-06-11 RX ADMIN — Medication 200 GRAM(S): at 17:56

## 2017-06-11 RX ADMIN — Medication 200 GRAM(S): at 05:29

## 2017-06-11 RX ADMIN — Medication 30 MILLILITER(S): at 07:59

## 2017-06-11 RX ADMIN — MORPHINE SULFATE 4 MILLIGRAM(S): 50 CAPSULE, EXTENDED RELEASE ORAL at 04:19

## 2017-06-11 RX ADMIN — ONDANSETRON 8 MILLIGRAM(S): 8 TABLET, FILM COATED ORAL at 09:16

## 2017-06-11 RX ADMIN — Medication 1 TABLET(S): at 23:34

## 2017-06-11 RX ADMIN — Medication 10 MILLIGRAM(S): at 22:01

## 2017-06-11 RX ADMIN — CALCITRIOL 0.5 MICROGRAM(S): 0.5 CAPSULE ORAL at 14:24

## 2017-06-11 RX ADMIN — Medication 10 MILLIGRAM(S): at 14:33

## 2017-06-11 RX ADMIN — Medication 10 MILLIGRAM(S): at 04:18

## 2017-06-11 RX ADMIN — SODIUM CHLORIDE 500 MILLILITER(S): 9 INJECTION INTRAMUSCULAR; INTRAVENOUS; SUBCUTANEOUS at 04:19

## 2017-06-11 RX ADMIN — Medication 10 MILLIGRAM(S): at 08:03

## 2017-06-11 NOTE — H&P ADULT - NSHPREVIEWOFSYSTEMS_GEN_ALL_CORE
CONSTITUTIONAL: No weakness, fevers or chills  EYES/ENT: No visual changes;  No vertigo or throat pain   NECK: No pain or stiffness  RESPIRATORY: SOB on ambulation  CARDIOVASCULAR: No chest pain or palpitations  GASTROINTESTINAL: Abdominal pain w/ N/V  GENITOURINARY: No dysuria, frequency or hematuria  NEUROLOGICAL: No numbness or weakness  SKIN: No itching, burning, rashes, or lesions   All other review of systems is negative unless indicated above.  .

## 2017-06-11 NOTE — ED ADULT NURSE NOTE - OBJECTIVE STATEMENT
Break coverage RN received pt to spot 28 A&Ox4 c/o epigastric pain associated with N/V x several days seen and d/c'd earlier today but returned for worsening symptoms. Pt instructed to f/u with gastro but unable to get appointment until monday and couldn't wait. Pt appears uncomfortable on assessment, abdomen noted to be distended but pt reports looks normal for her. VS as noted, will reassess.

## 2017-06-11 NOTE — CONSULT NOTE ADULT - PROBLEM SELECTOR RECOMMENDATION 9
This patient's current condition does not warrant an acute general surgical intervention or cholecystectomy. However, considering this patient was previously diagnosed and treated for gastritis and has evidence of bile reflux, a PPI would be warranted. Pro-motility agents can be considered in patients with bile reflux causing duodenopathy, gastritis, esophagitis. Furthermore, correcting this patient's calcium to a normal level might help symptomatology. Although hyperbilirubinemia, nonspecific gallbladder wall thickening can be found in the setting of congestive hepatopathy and backward failure we recommend GI evaluation for previous work-up of gastroparesis as well as MRCP to evaluate for biliary obstruction. This patient's current condition does not warrant an acute general surgical intervention or cholecystectomy. However, considering this patient was previously diagnosed and treated for gastritis and has evidence of bile reflux, a PPI would be warranted. Pro-motility agents can be considered in patients with bile reflux causing duodenopathy, gastritis, esophagitis. Furthermore, correcting this patient's calcium to a normal level might help symptomatology. Although hyperbilirubinemia, nonspecific gallbladder wall thickening can be found in the setting of congestive hepatopathy and backward failure we recommend GI evaluation and Autoimmune hepatitis work-up. Also given previous concern for gastroparesis, recommend gastric emptying study.  Ursodiol for cholelithiasis. Discussed with Dr. Smalls

## 2017-06-11 NOTE — CONSULT NOTE ADULT - ASSESSMENT
This patient is assessed as being a 61-year-old female with a contracted gallbladder and cholelithiasis as well as a constellation of symptoms more likely related to both her cardiac failure (EF 22% 5/17/17)/backward failure resulting in her dyspnea, fatigue, nausea, vomiting, right-sided abdominal pain (liver congestion/ persistent transaminitis), bloating, and, her persistent hypocalcemia which can also worsen her symptoms of dyspnea, fatigue, and abdominal pain. Given her new hyperbilirubinemia recommend MRCP and GI Evaluation. Patient is at very high risk given her respiratory symptoms and congestive heart failure for any surgical intervention. This patient is assessed as being a 61-year-old female with a contracted gallbladder and cholelithiasis as well as a constellation of symptoms more likely related to both her cardiac failure (EF 22% 5/17/17)/backward failure resulting in her dyspnea, fatigue, nausea, vomiting, right-sided abdominal pain (liver congestion/ persistent transaminitis), bloating, and, her persistent hypocalcemia which can also worsen her symptoms of dyspnea, fatigue, and abdominal pain. Given her new hyperbilirubinemia recommend GI Evaluation. Patient is at very high risk given her respiratory symptoms and congestive heart failure for any surgical intervention.

## 2017-06-11 NOTE — H&P ADULT - HISTORY OF PRESENT ILLNESS
62 y/o F PMH DM2 (A1C 7.5 5/17), CHFrEF, HTN, thyroid CA s/p thyroidectomy c/b persistent hypocalcemia, Cholelithiasis p/w abdominal pain. Has been having persistent abdominal pain for past month with multiple ED visits, last yesterday, and one admission  in 5/17 for similar symptoms. She state pain is present in the upper abdomen R>L. She notes that it is a constant pain, dull throughout most of the day but worsens when she tries to eat but is often unable to keep any food down. She has been having nausea and vomiting throughout the duration of the abdominal pain but has occurred more frequently lately. On ROS she notes she intermittently has SOB while walking. Denies fever, chest pain, headache, constipation or diarrhea.   In ED VS T 98.8 HR 88 /77. Labs significant for Cr of 1.59, ca 5.6, Tbili 1.7 w/ elevated alk phos and transaminitis. Given 500 cc NS bolus and several doses of Reglan for n/v and 1x morphine

## 2017-06-11 NOTE — CONSULT NOTE ADULT - SUBJECTIVE AND OBJECTIVE BOX
HPI:  Ms. Carson is a 60 y/o woman with hypertension, type 2 diabetes mellitus, systolic CHF, thyroid CA s/p thyroidectomy c/b persistent hypocalcemia, and cholelithiasis who today was admitted at [facility] with upper abdominal pain for 1 month. She has visited the ER several times in the past month with abdominal pain, and given the persistence of her pain, she was admitted this time. The pain is dull, constant, more right than left sided, exacerbated by eating, and is associated with nausea and vomiting.     In the ER, her serum creatinine was 1.59 and her serum calcium was 5.6mg/dL. Therefore, a renal consultation was requested.    PAST MEDICAL & SURGICAL HISTORY:  Asthma  CHF (congestive heart failure): hypothyroid  DM (diabetes mellitus)  HTN (hypertension)  Thyroid ca  S/P thyroidectomy      MEDICATIONS  (STANDING):  heparin  Injectable 5000Unit(s) SubCutaneous every 8 hours  lactated ringers. 1000milliLiter(s) IV Continuous <Continuous>  sucralfate 1Gram(s) Oral two times a day  aspirin enteric coated 81milliGRAM(s) Oral daily  carvedilol 3.125milliGRAM(s) Oral every 12 hours  levothyroxine 200MICROGram(s) Oral daily  calcitriol   Capsule 0.5MICROGram(s) Oral daily  ergocalciferol 97993Nxyz(s) Oral every week  calcium carbonate 1250 mG (OsCal) 1Tablet(s) Oral four times a day  insulin lispro (HumaLOG) corrective regimen sliding scale  SubCutaneous three times a day before meals  dextrose 5%. 1000milliLiter(s) IV Continuous <Continuous>  dextrose 50% Injectable 12.5Gram(s) IV Push once  dextrose 50% Injectable 25Gram(s) IV Push once  dextrose 50% Injectable 25Gram(s) IV Push once      Allergies  penicillin (Rash)    SOCIAL HISTORY:  Denies ETOh,Smoking,     FAMILY HISTORY:  No pertinent family history in first degree relatives      REVIEW OF SYSTEMS:  CONSTITUTIONAL: No weakness, fevers or chills  EYES/ENT: No visual changes;  No vertigo or throat pain   NECK: No pain or stiffness  RESPIRATORY: (+)EDDY; no cough, wheezing, hemoptysis;   CARDIOVASCULAR: No chest pain or palpitations  GASTROINTESTINAL: (+)abdominal pain, (+)N, (+)V  GENITOURINARY: No dysuria, frequency or hematuria  NEUROLOGICAL: No numbness or weakness  SKIN: No itching, burning, rashes, or lesions   All other review of systems is negative unless indicated above.    VITAL:  T(C): , Max: 37.2 (06-11 @ 12:13)  T(F): , Max: 98.9 (06-11 @ 12:13)  HR: 77  BP: 115/87  RR: 19  SpO2: 100%    PHYSICAL EXAM:  Constitutional: NAD, Alert  HEENT: NCAT, MMM  Neck: Supple, No JVD  Respiratory: CTA-b/l  Cardiovascular: RRR s1s2, no m/r/g  Gastrointestinal: BS+, soft, NT/ND  Extremities: No peripheral edema b/l  Neurological: no focal deficits; strength grossly intact  Psychiatric: Normal mood, normal affect  Back: no CVAT b/l  Skin: No rashes, no nevi    LABS:                        12.3   10.08 )-----------( 313      ( 11 Jun 2017 15:20 )             37.4     Na(129)/K(4.2)/Cl(84)/HCO3(24)/BUN(24)/Cr(1.59)Glu(205)/Ca(5.6)/Mg(1.6)/PO4(5.5)    06-11 @ 03:45  Na(133)/K(4.0)/Cl(86)/HCO3(22)/BUN(24)/Cr(1.36)Glu(180)/Ca(5.7)/Mg(--)/PO4(--)    06-10 @ 02:15      IMAGING:    RUQUS 6/11/17 - (+)gallstones, (+)mild GB wall thickening  CT A/P O+I+ 6/10/17 (+)gallstones, no hydro b/l  CXR 6/11/17 clear      IMPRESSION:    (1)Renal - MAGNUS - likely contrast nephropathy, s/p CT I+ 6/10/17    (2)Hypocalcemia - surgically-induced hypoparathyroidism    (3)Hyponatremia - mild - unclear exact etiology. SIADH due to pain?    (4)Abdominal pain - due to gallstones/cholecystitis?      RECOMMEND:    (1)Follow up GI input  (2)BMP daily  (3)Calcium PO as ordered  (4)Increase calcitriol to 0.5mg po bid  (5)LR 50cc/h x 12 hours okay as ordered for now  (6)Avoid NSAIDs for pain  (7)Dose new meds for GFR 30ml/min for now      Thank you for involving [facility] in this patient's care.    With warm regards,      Brian Henry MD  [facility]  [phone] HPI:  Ms. Carson is a 60 y/o woman with hypertension, type 2 diabetes mellitus, systolic CHF, thyroid CA s/p thyroidectomy c/b persistent hypocalcemia, and cholelithiasis who today was admitted at [facility] with upper abdominal pain for 1 month. She has visited the ER several times in the past month with abdominal pain, and given the persistence of her pain, she was admitted this time. The pain is dull, constant, more right than left sided, exacerbated by eating, and is associated with nausea and vomiting.     In the ER, her serum creatinine was 1.59 and her serum calcium was 5.6mg/dL. Therefore, a renal consultation was requested. Patient is s/p 1gm of IV calcium gluconate.    PAST MEDICAL & SURGICAL HISTORY:  Asthma  CHF (congestive heart failure): hypothyroid  DM (diabetes mellitus)  HTN (hypertension)  Thyroid ca  S/P thyroidectomy      MEDICATIONS  (STANDING):  heparin  Injectable 5000Unit(s) SubCutaneous every 8 hours  lactated ringers. 1000milliLiter(s) IV Continuous <Continuous>  sucralfate 1Gram(s) Oral two times a day  aspirin enteric coated 81milliGRAM(s) Oral daily  carvedilol 3.125milliGRAM(s) Oral every 12 hours  levothyroxine 200MICROGram(s) Oral daily  calcitriol   Capsule 0.5MICROGram(s) Oral daily  ergocalciferol [id number](s) Oral every week  calcium carbonate 1250 mG (OsCal) 1Tablet(s) Oral four times a day  insulin lispro (HumaLOG) corrective regimen sliding scale  SubCutaneous three times a day before meals  dextrose 5%. 1000milliLiter(s) IV Continuous <Continuous>  dextrose 50% Injectable 12.5Gram(s) IV Push once  dextrose 50% Injectable 25Gram(s) IV Push once  dextrose 50% Injectable 25Gram(s) IV Push once      Allergies  penicillin (Rash)    SOCIAL HISTORY:  Denies ETOh,Smoking,     FAMILY HISTORY:  No pertinent family history in first degree relatives      REVIEW OF SYSTEMS:  CONSTITUTIONAL: No weakness, fevers or chills  EYES/ENT: No visual changes;  No vertigo or throat pain   NECK: No pain or stiffness  RESPIRATORY: (+)EDDY; no cough, wheezing, hemoptysis;   CARDIOVASCULAR: No chest pain or palpitations  GASTROINTESTINAL: (+)abdominal pain, (+)N, (+)V  GENITOURINARY: No dysuria, frequency or hematuria  NEUROLOGICAL: No numbness or weakness  SKIN: No itching, burning, rashes, or lesions   All other review of systems is negative unless indicated above.    VITAL:  T(C): , Max: 37.2 (06-11 @ 12:13)  T(F): , Max: 98.9 (06-11 @ 12:13)  HR: 77  BP: 115/87  RR: 19  SpO2: 100%    PHYSICAL EXAM:  Constitutional: NAD, Alert  HEENT: NCAT, MMM  Neck: Supple, No JVD  Respiratory: CTA-b/l  Cardiovascular: RRR s1s2, no m/r/g  Gastrointestinal: BS+, soft, NT/ND  Extremities: No peripheral edema b/l  Neurological: no focal deficits; strength grossly intact  Psychiatric: Normal mood, normal affect  Back: no CVAT b/l  Skin: No rashes, no nevi    LABS:                        12.3   10.08 )-----------( 313      ( 11 Jun 2017 15:20 )             37.4     Na(129)/K(4.2)/Cl(84)/HCO3(24)/BUN(24)/Cr(1.59)Glu(205)/Ca(5.6)/Mg(1.6)/PO4(5.5)    06-11 @ 03:45  Na(133)/K(4.0)/Cl(86)/HCO3(22)/BUN(24)/Cr(1.36)Glu(180)/Ca(5.7)/Mg(--)/PO4(--)    06-10 @ 02:15      IMAGING:    RUQUS 6/11/17 - (+)gallstones, (+)mild GB wall thickening  CT A/P O+I+ 6/10/17 (+)gallstones, no hydro b/l  CXR 6/11/17 clear      IMPRESSION:    (1)Renal - MAGNUS - likely contrast nephropathy, s/p CT I+ 6/10/17    (2)Hypocalcemia - surgically-induced hypoparathyroidism. Chronic.    (3)Hyponatremia - mild - unclear exact etiology. SIADH due to pain?    (4)Abdominal pain - due to gallstones/cholecystitis?      RECOMMEND:    (1)Follow up GI input  (2)BMP daily; IV calcium 1amp prn calcium 5.6-6.0; 2amp prn calcium <5.6  (3)Calcium PO as ordered  (4)Increase calcitriol to 0.5mg po bid  (5)LR 50cc/h x 12 hours okay as ordered for now  (6)Avoid NSAIDs for pain  (7)Dose new meds for GFR 30ml/min for now      Thank you for involving [facility] in this patient's care.    With warm regards,      Brian Henry MD  [facility]  [phone] HPI:  Ms. Carson is a 60 y/o woman with hypertension, type 2 diabetes mellitus, systolic CHF, thyroid CA s/p thyroidectomy c/b persistent hypocalcemia, and cholelithiasis who today was admitted at [facility] with upper abdominal pain for 1 month. She has visited the ER several times in the past month with abdominal pain, and given the persistence of her pain, she was admitted this time. The pain is dull, constant, more right than left sided, exacerbated by eating, and is associated with nausea and vomiting.     In the ER, her serum creatinine was 1.59 and her serum calcium was 5.6mg/dL. Therefore, a renal consultation was requested. Patient is s/p 1gm of IV calcium gluconate.    Ms. Carson denies CKD. She tells me that hypercalcemia has been an issue for her ever since she underwent thyroid surgery in 2000, but the numbers have trended down significantly further ever since she was placed on Torsemide in 2013. She has had to go from taking Calcium bid to taking it QID, and yet the numbers still trend significantly further below where they were years ago.    PAST MEDICAL & SURGICAL HISTORY:  Asthma  CHF (congestive heart failure): hypothyroid  DM (diabetes mellitus)  HTN (hypertension)  Thyroid ca  S/P thyroidectomy      MEDICATIONS  (STANDING):  heparin  Injectable 5000Unit(s) SubCutaneous every 8 hours  lactated ringers. 1000milliLiter(s) IV Continuous <Continuous>  sucralfate 1Gram(s) Oral two times a day  aspirin enteric coated 81milliGRAM(s) Oral daily  carvedilol 3.125milliGRAM(s) Oral every 12 hours  levothyroxine 200MICROGram(s) Oral daily  calcitriol   Capsule 0.5MICROGram(s) Oral daily  ergocalciferol 51861Expi(s) Oral every week  calcium carbonate 1250 mG (OsCal) 1Tablet(s) Oral four times a day  insulin lispro (HumaLOG) corrective regimen sliding scale  SubCutaneous three times a day before meals  dextrose 5%. 1000milliLiter(s) IV Continuous <Continuous>  dextrose 50% Injectable 12.5Gram(s) IV Push once  dextrose 50% Injectable 25Gram(s) IV Push once  dextrose 50% Injectable 25Gram(s) IV Push once      Allergies  penicillin (Rash)    SOCIAL HISTORY:  Denies ETOh,Smoking,     FAMILY HISTORY:  No pertinent family history in first degree relatives      REVIEW OF SYSTEMS:  CONSTITUTIONAL: No weakness, fevers or chills  EYES/ENT: No visual changes;  No vertigo or throat pain   NECK: No pain or stiffness  RESPIRATORY: (+)EDDY; no cough, wheezing, hemoptysis;   CARDIOVASCULAR: No chest pain or palpitations  GASTROINTESTINAL: (+)abdominal pain, (+)N, (+)V  GENITOURINARY: No dysuria, frequency or hematuria  NEUROLOGICAL: No numbness or weakness  SKIN: No itching, burning, rashes, or lesions   All other review of systems is negative unless indicated above.    VITAL:  T(C): , Max: 37.2 (06-11 @ 12:13)  T(F): , Max: 98.9 (06-11 @ 12:13)  HR: 77  BP: 115/87  RR: 19  SpO2: 100%    PHYSICAL EXAM:  Constitutional: NAD, Alert  HEENT: NCAT, MMM;  Neck: Supple, No JVD  Respiratory: CTA-b/l  Cardiovascular: RRR s1s2, no m/r/g  Gastrointestinal: BS+, soft, NT/ND  Extremities: No peripheral edema b/l  Neurological: no focal deficits; strength grossly intact; no tremor; Chvostek's negative  Psychiatric: Normal mood, normal affect  Back: no CVAT b/l  Skin: No rashes, no nevi    LABS:                        12.3   10.08 )-----------( 313      ( 11 Jun 2017 15:20 )             37.4     Na(129)/K(4.2)/Cl(84)/HCO3(24)/BUN(24)/Cr(1.59)Glu(205)/Ca(5.6)/Mg(1.6)/PO4(5.5)    06-11 @ 03:45  Na(133)/K(4.0)/Cl(86)/HCO3(22)/BUN(24)/Cr(1.36)Glu(180)/Ca(5.7)/Mg(--)/PO4(--)    06-10 @ 02:15      IMAGING:    RUQUS 6/11/17 - (+)gallstones, (+)mild GB wall thickening  CT A/P O+I+ 6/10/17 (+)gallstones, no hydro b/l  CXR 6/11/17 clear      IMPRESSION:    (1)Renal - MAGNUS - likely contrast nephropathy, s/p CT I+ 6/10/17    (2)Hypocalcemia - surgically-induced hypoparathyroidism; exacerbated by loop diuretics. Can we use thiazides instead of loop diuretics eventually? Would only look to do so if hyponatremia does not remain an issue for her.    (3)Hyponatremia - mild - unclear exact etiology. SIADH due to pain?    (4)Abdominal pain - due to gallstones/cholecystitis?      RECOMMEND:    (1)Follow up GI input  (2)BMP daily; IV calcium 1amp prn calcium 5.6-6.0; 2amp prn calcium <5.6  (3)Calcium PO as ordered  (4)Increase calcitriol to 0.5mg po bid  (5)LR 50cc/h x 12 hours okay as ordered for now; no diuretics for now  (6)Avoid NSAIDs for pain  (7)Dose new meds for GFR 30ml/min for now      Thank you for involving [facility] in this patient's care.    With warm regards,      Brian Henry MD  [facility]  [phone]

## 2017-06-11 NOTE — ED PROVIDER NOTE - MEDICAL DECISION MAKING DETAILS
61 F with gall stones, returning to ER for persistent RUQ pain, n/v. Will repeat labs, RUQ US, d/w surgery and admit to pain control, inability to tolerate PO

## 2017-06-11 NOTE — H&P ADULT - NSHPLABSRESULTS_GEN_ALL_CORE
13.0   11.58 )-----------( 327      ( 11 Jun 2017 03:45 )             39.3   06-11    129<L>  |  84<L>  |  24<H>  ----------------------------<  205<H>  4.2   |  24  |  1.59<H>    Ca    5.6<LL>      11 Jun 2017 03:45  Phos  5.5     06-11  Mg     1.6     06-11    TPro  7.5  /  Alb  4.2  /  TBili  1.7<H>  /  DBili  x   /  AST  99<H>  /  ALT  127<H>  /  AlkPhos  209<H>  06-11      Abominal U/S 6/11  FINDINGS:    Liver: Hepatic steatosis.  Bile ducts: Normal caliber. Common bile duct measures 6 mm.   Gallbladder:  Evaluation is somewhat limited due to overlying bowel gas.   Nonmobilegallstones. Suggestion of mild gallbladder wall thickening.   Sonographic Davila sign cannot be assessed in the setting of pain   medication.  Pancreas: Not well visualized due to overlying bowel gas.  Right kidney: 11.6 cm. No hydronephrosis.  Ascites: None  Aorta/IVC: The visualized portions are are within normal limits.    IMPRESSION:     Overlying bowel gas somewhat limits elevation of the right upper   quadrant.     Nonmobile gallstones and suggestion of mild gallbladder wall thickening.   Sonographic Davila sign cannot be assessed in setting of pain medication.    CT abdomen 6/10  FINDINGS:    LOWER CHEST: The heart size is enlarged.    LIVER: Within normal limits.  BILE DUCTS: Normal caliber.  GALLBLADDER: There is redemonstration of contractionof the gallbladder   with multiple intraluminal stones. There is mild nonspecific gallbladder   wall edema and/or thickening. Appearance is unchanged from the prior.  SPLEEN: Within normal limits.  PANCREAS: Within normal limits.  ADRENALS: Within normal limits.  KIDNEYS/URETERS: Within normal limits.    BLADDER: Within normal limits.  REPRODUCTIVE ORGANS: The uterus and adnexa are within normal limits.    BOWEL: No bowel obstruction. Appendix is normal.  PERITONEUM: No ascites.  VESSELS:  Withinnormal limits.  RETROPERITONEUM: No lymphadenopathy.    ABDOMINAL WALL: Within normal limits.  BONES: Within normal limits.    IMPRESSION: Contracted gallbladder with intraluminal stones. Nonspecific   gallbladder wall thickening or edema. Appearanceis unchanged from the   prior recent CT and MRI examinations.    HIDA 5/13/17  FINDINGS: There is prompt, homogeneous uptake of radiotracer by the   hepatocytes. Activity is first seen in the bowel at 40 minutes. The   gallbladder is visualized 15 minutes after the administration of   morphine. There is good clearance of activity from the liver at the end   of the study.    IMPRESSION: Normal morphine-augmented hepatobiliary scan.    No evidence of acute cholecystitis.

## 2017-06-11 NOTE — CONSULT NOTE ADULT - SUBJECTIVE AND OBJECTIVE BOX
[facility] General Surgical Consultation  Consultation of : Dr WENDY Smalls  B-Team Pager [phone]    This patient was previously evaluated on 6/9/17 AM for abdominal pain which has been persistent and previously been worked up to be negative for gallbladder pathology this year including but not limited to  MRCP/EGD/CT Abdomen/Pelvis/RUQ US/HIDA. In addition, she has been diagnosed and attempted to be treated for gastroparesis gastritis as of this year and recommended to establish care with gastroenterology, which she has not done. This patient was discharged from the ED the evening of 6/11/17 with the understanding she would follow-up with Gastroenterology, instead returned to the ED 6/10/17 with the complaint of persistent nausea and "bilious" vomiting. In short this is a morbidly obese 61- year old female who presents now a third time in a month with persistent vague symptoms of upper abdominal pain, nausea, vomiting, fatigue, dyspnea, and body aches. She states she wakes "every morning" with abdominal pain and sometimes worsens with eating. Per the patient it is located in the upper abdomen. She describes the pain as dull non-radiating and constant. No alleviating factors could be elicited.  She is accompanied by her daughter. She denies any recent trauma. The pain was without fever, chills, or, diarrhea. She has not had any recent sick exposures and denied having had foreign travel. She has not had urinary symptoms of urgency, frequency or dysuria. She is not sexually active.    Medical and Surgical History: Asthma, CHF, hypothyroid, DM, HTN, Hypocalcemia s/p thyroid cancer: S/P thyroidectomy  Allergies: penicillin (Rash)  Medications: Lasix, Calcitriol, Levothyroxine, sucralfate, lisinopril, aspirin, carvedilol, torsemide. ergocalciferol.   Social: No ethanol or tobacco use    Physical Exam:  Vital Signs: T(C): 36.4, Max: 37.2 HR: 77 (70 - 88) BP: 115/87 (104/67 - 119/77) RR: 19 (16 - 20) SpO2: 100% (95% - 100%)    She was awake, alert and in no distress  She was anicteric and she did not have thrush. Her oropharyngeal mucosa was normal. She had reactive pupils and her extra-occular movements were intact. She did not have JVD. Her lungs were clear bilaterally and she had non-labored respirations. She had symmetrical chest wall movements. She had regular heart tones and she did not have a murmur or gallop. Her abdomen was obese, soft, nondistended with tenderness in the right upper quadrant and epigastrium. There were no palpable masses or abdominal wall hernias. She had active bowel sounds. She had normal external genitalia. She did not have a rash. Her extremities were well perfused. She had a normal musculoskeletal exam.    An 22 gauge IV cannula was inserted into a vein of her left upper extremity and fluid was given.     CBC Full  -  ( 11 Jun 2017 15:20 )  WBC Count : 10.08 K/uL  Hemoglobin : 12.3 g/dL  Hematocrit : 37.4 %  Platelet Count - Automated : 313 K/uL  Mean Cell Volume : 76.5 fL  Mean Cell Hemoglobin : 25.2 pg  Mean Cell Hemoglobin Concentration : 32.9 %    127<L>  |  84<L>  |  24<H>  ----------------------------<  167<H>  4.1   |  21<L>  |  1.44<H>    Ca    5.7<LL>      11 Jun 2017 15:20  Phos  5.2     06-11  Mg     1.6     06-11    TPro  7.4  /  Alb  4.1  /  TBili  2.0<H>  /  DBili  x   /  AST  126<H>  /  ALT  153<H>  /  AlkPhos  212<H>  06-11    Radiology:   EXAM:  US ABDOMEN LIMITED    PROCEDURE DATE:  Jun 11 2017   INTERPRETATION:  CLINICAL INFORMATION: Right upper quadrant pain for 2 days.  COMPARISON: CT abdomen/pelvis from Renee 10, 2017. Right upper quadrant ultrasound from June 8,2017.  TECHNIQUE: Sonography of the right upper quadrant.   FINDINGS:  Liver: Hepatic steatosis.  Bile ducts: Normal caliber. Common bile duct measures 6 mm.   Gallbladder:  Evaluation is somewhat limited due to overlying bowel gas. Nonmobile gallstones. Suggestion of mild gallbladder wall thickening. Sonographic Davila sign cannot be assessed in the setting of pain medication.  Pancreas: Not well visualized due to overlying bowel gas.  Right kidney: 11.6 cm. No hydronephrosis.  Ascites: None  Aorta/IVC: The visualized portions are are within normal limits.  IMPRESSION:   Overlying bowel gas somewhat limits elevation of the right upper quadrant. Nonmobile gallstones and suggestion of mild gallbladder wall thickening. Sonographic Davila sign cannot be assessed in setting of pain medication.  STACI BRUNO M.D., ATTENDING RADIOLOGIST  This document has been electronically signed. Jun 11 2017  7:37AM      EXAM:  CT ABDOMEN AND PELVIS OC IC    PROCEDURE DATE:  Po 10 2017   INTERPRETATION:  CLINICAL INFORMATION: Abdominal pain  COMPARISON: CT abdomen and pelvis dated 5/30/2017. Comparison is also made to prior MRI examination of the abdomen dated 5/15/2017. Prior ultrasound examination of the abdomen from 6/8/2017.  PROCEDURE: CT of the Abdomen and Pelvis was performed with intravenous contrast. Intravenous contrast: 90 ml Omnipaque 350. 10 ml discarded.  Oral contrast: positive contrast was administered.  Sagittal and coronal reformats were performed.  FINDINGS:  LOWER CHEST: The heart size is enlarged.  LIVER: Within normal limits.  BILE DUCTS: Normal caliber.  GALLBLADDER: There is redemonstration of contraction of the gallbladder with multiple intraluminal stones. There is mild nonspecific gallbladder wall edema and/or thickening. Appearance is unchanged from the prior.  SPLEEN: Within normal limits.  PANCREAS: Within normal limits.  ADRENALS: Within normal limits.  KIDNEYS/URETERS: Within normal limits.  BLADDER: Within normal limits.  REPRODUCTIVE ORGANS: The uterus and adnexa are within normal limits.  BOWEL: No bowel obstruction. Appendix is normal.  PERITONEUM: No ascites.  VESSELS:  Within normal limits.  RETROPERITONEUM: No lymphadenopathy.    ABDOMINAL WALL: Within normal limits.  BONES: Within normal limits.  IMPRESSION: Contracted gallbladder with intraluminal stones. Nonspecific gallbladder wall thickening or edema. Appearance is unchanged from the prior recent CT and MRI examinations.  STACI BRUNO M.D., ATTENDING RADIOLOGIST  This document has been electronically signed. Po 10 2017  6:21AM    Historical Exams:      EXAM:  US ABDOMEN LIMITED    PROCEDURE DATE:  Jun 8 2017   INTERPRETATION:  CLINICAL INFORMATION: Known history of gall stones. Worsening right upper quadrant pain.  COMPARISON: Right upper quadrant sonogram dated 5/13/2017.  TECHNIQUE: Sonography of the right upper quadrant.   FINDINGS: The study is limited by excessive bowel gas.  Liver: Not well visualized. The right hepatic lobe measures 17.8 cm in length.  Bile ducts: Normal caliber. Common duct measures up to 6 mm.   Gallbladder: Not identified. Unable to evaluate for sonographic Davila sign as patient received pain medication prior to the study.  Pancreas: Obscured by bowel gas but the visualized portions of the head and body are unremarkable.  Right kidney: 10.9 cm in length. No hydronephrosis or shadowing stones.  Ascites: None.  Aorta and IVC: Within normal limits.  IMPRESSION: Limited exam examination due to excessive bowel gas. The gallbladder is not identified. No biliary ductal dilatation.  JOHANNA CONROY M.D., RADIOLOGY RESIDENT  This document has been electronically signed.  BARI BALL M.D., ATTENDING RADIOLOGIST  This document has been electronically signed. Jun 8 2017  2:42AM    PROCEDURE: Transthoracic echocardiogram with 2-D, M-Mode  and complete spectral and color flow Doppler.  Verbal consent was obtained for injection of echo contrast.  Following  intravenous injection of contrast, harmonic  imaging was performed.lot#[id number]  INDICATION: Unspecified systolic (congestive) heart failure  (I50.20)  ------------------------------------------------------------------------  DIMENSIONS:  Dimensions:     Normal Values:  LA:     4.6 cm    2.0 - 4.0 cm  Ao:     2.5 cm    2.0 - 3.8 cm  SEPTUM: 0.8 cm    0.6 - 1.2 cm  PWT:    0.7 cm    0.6 - 1.1 cm  LVIDd:  6.8 cm    3.0 - 5.6 cm  LVIDs:  6.1cm    1.8 - 4.0 cm  Derived Variables:  LVMI: 99 g/m2  RWT: 0.20  Fractional short: 10 %  Ejection Fraction (Teicholtz): 22 %  ------------------------------------------------------------------------  OBSERVATIONS:  Mitral Valve: Mitral annular calcification, otherwise  normal mitral valve. Moderate mitral regurgitation.  Aortic Root: Normal aortic root.  Aortic Valve: Calcified trileaflet aortic valve with normal  opening.  Left Atrium: Moderately dilated left atrium.  LA volume  index = 46 cc/m2.  Left Ventricle: Severe global left ventricular systolic  dysfunction.  Endocardial visualization enhanced with  intravenous injection of echo contrast (Definity). Severe  left ventricular enlargement.  Right Heart: Normal right atrium. Normal right ventricular  size with decreased right ventricular systolic function.  Normal tricuspid valve.  Moderate tricuspid regurgitation.  Normal pulmonic valve.  Pericardium/PleuraNormal pericardium with no pericardial  effusion.  Hemodynamic: Estimated right ventricular systolic pressure  equals 53 mm Hg, assuming right atrial pressure equals 10  mm Hg, consistent with moderate pulmonary hypertension.  ------------------------------------------------------------------------  CONCLUSIONS:  1. Mitral annular calcification, otherwise normal mitral  valve. Moderate mitral regurgitation.  2. Moderately dilated left atrium.  LA volume index = 46  cc/m2.  3. Severe left ventricular enlargement.  4. Severe global left ventricular systolic dysfunction.  Endocardial visualization enhanced with intravenous  injection of echo contrast (Definity).  5. Normal right ventricular size with decreased right  ventricular systolic function.  6. Estimated right ventricular systolic pressure equals 53  mm Hg, assuming right atrial pressure equals 10 mm Hg,  consistent with moderate pulmonary hypertension.  *** Compared with echocardiogram of 5/22/2014, no  significant changes noted.  ------------------------------------------------------------------------  Confirmed on  5/16/2017 - 11:49:02 by Xander Tyson M.D.  ------------------------------------------------------------------------  EXAM:  MRI UPPER ABDOMEN W O C    PROCEDURE DATE:  May 15 2017   INTERPRETATION:  CLINICAL INFORMATION: Elevated liver function tests and gallbladder sludge. Evaluate for stone.  COMPARISON: CT abdomen and pelvis 5/13/2017.  PROCEDURE: MRI/MRCP was performed without intravenous contrast. Radial and 3D MRCP sequences were obtained.   FINDINGS:  LOWER CHEST: Cardiomegaly.  LIVER: Within normal limits.  BILE DUCTS: No choledocholithiasis.  GALLBLADDER: Cholelithiasis. Trace pericholecystic fluid.  SPLEEN: Within normal limits.  PANCREAS: Within normal limits.  ADRENALS: Within normal limits.  KIDNEYS/URETERS: Subcentimeter cyst in the left kidney. No hydronephrosis.  VISUALIZED PORTIONS:  BOWEL: Within normal limits.   PERITONEUM: No ascites.  VESSELS: Within normal limits.  RETROPERITONEUM: No lymphadenopathy.    ABDOMINAL WALL: Within normal limits.    IMPRESSION: Cholelithiasis. No choledocholithiasis.  JOSUÉ BABB M.D., ATTENDING RADIOLOGIST  This document has been electronically signed. May 15 2017  4:26PM    EXAM:  CT ABDOMEN AND PELVIS IC    PROCEDURE DATE:  May 13 2017   INTERPRETATION:  CLINICAL INFORMATION: Right upper quadrant abdominal pain  COMPARISON: None  PROCEDURE: CT of the Abdomen and Pelvis was performed with intravenous contrast. Intravenous contrast: 90 ml Omnipaque 350. 10 ml discarded.  Oral contrast: positive contrast was administered.Sagittal and coronal reformats were performed.  FINDINGS:  LOWER CHEST: Basilar subsegmental atelectasis.  LIVER: Diffusehepatic steatosis.  BILE DUCTS: Normal caliber.  GALLBLADDER: Contracted with cholelithiasis and  trace pericholecystic fluid.  SPLEEN: Within normal limits.  PANCREAS: Within normal limits.  ADRENALS: Within normal limits.  KIDNEYS/URETERS: Withinnormal limits.  BLADDER: Within normal limits.  REPRODUCTIVE ORGANS: No adnexal masses  BOWEL: No bowel obstruction. Appendix is normal.  PERITONEUM: No ascites.  VESSELS:  Calcified and noncalcified atherosclerotic plaque is noted throughout the aorta and its branches. Retrograde enhancement of the IVC and hepatic veins suggesting tricuspid insufficiency.  RETROPERITONEUM: No lymphadenopathy.    ABDOMINAL WALL: Within normal limits.  BONES: Degenerative changes of the thoracolumbar spine.  IMPRESSION: Gallbladder is contracted with cholelithiasis and trace pericholecystic fluid. If further evaluation is needed a HIDA may be performed.  TAMIKO DEL ROSARIO M.D., RADIOLOGY RESIDENT  This document has been electronically signed.  LILLIAN MARTINEZ M.D., ATTENDING RADIOLOGIST  This document has been electronically signed. May 13 2017  3:23PM    EXAM:  NM HEPATOBILIARY IMG    PROCEDURE DATE:  May 13 2017   INTERPRETATION:  CLINICAL INFORMATION: 61-year-old female with right upper quadrant abdominal pain and CT findings of cholelithiasis and trace   pericholecystic fluid, evaluatefor acute cholecystitis.  RADIOPHARMACEUTICAL: 3 mCi Tc-99m-Mebrofenin, I.V.; 2 doses  TECHNIQUE:  Dynamic imaging of the anterior abdomen was performed for 85 minutes after injection of radiotracer followed by static images of the   abdomen inthe anterior, right lateral and right anterior oblique projections.  Morphine 4 mg I.V. and a second dose of radiotracer were administered at 1 hour.    COMPARISON: No prior hepatobiliary scan available.  FINDINGS: There is prompt, homogeneous uptake of radiotracer by the hepatocytes. Activity is first seen in the bowel at 40 minutes. The   gallbladder is visualized 15 minutes after the administration of morphine. There is good clearance of activity from the liver at the end of the study.  IMPRESSION: Normal morphine-augmented hepatobiliary scan. No evidence of acute cholecystitis.  NELIDA ERVIN M.D., NUCLEAR MEDICINE ATTENDING  This document has been electronically signed. May 13 2017  8:16PM     Procedure:           Upper GI endoscopy  Indications:         Generalized abdominal pain  Providers:           LAZARO PLAZA MD  Medicines:           Monitored Anesthesia Care  Complications:       No immediate complications.  Procedure:           Pre-Anesthesia Assessment:                       - Prior to the procedure, a History and Physical was                        performed, and patient medications, allergies and                        sensitivities were reviewed. The patient's tolerance of                   previous anesthesia was reviewed.                       After obtaining informed consent, the endoscope was                        passed under direct vision. Throughout the procedure,                        the patient's blood pressure, pulse, and oxygen                        saturations were monitored continuously. The was                        introduced through the mouth, and advanced to the second                        part of duodenum.                                               Findings:       LA Grade B (one or more mucosal breaks greater than 5 mm, not extending        between the tops of two mucosal folds) esophagitis with no bleeding was        found.       Diffuse moderate inflammation was found in the gastric body and in the        gastric antrum. Biopsies were taken with a cold forceps for histology.        There was evidence of bile reflux.       Moderately erythematous mucosa without active bleeding and with no    stigmata of bleeding was found in the duodenal bulb.                                                                                   Impression:          - LA Grade B reflux esophagitis.                       - Acute gastritis. Biopsied.              - Erythematous duodenopathy.  Recommendation:      - Await pathology results.                                                                                   LAZARO PLAZA MD  5/17/2017 5:00:15 PM

## 2017-06-11 NOTE — H&P ADULT - NSHPPHYSICALEXAM_GEN_ALL_CORE
VITAL SIGNS:  T(C): 37.2, Max: 37.2 (06-11 @ 12:13)  T(F): 98.9, Max: 98.9 (06-11 @ 12:13)  HR: 87 (70 - 88)  BP: 104/67 (104/67 - 119/77)  BP(mean): --  RR: 20 (16 - 20)  SpO2: 98% (95% - 100%)  Wt(kg): --    PHYSICAL EXAM:    Constitutional: Obese; NAD  Head: NC/AT  Eyes: PERRL, EOMI, anicteric sclera  ENT: no nasal discharge; uvula midline, no oropharyngeal erythema or exudates; MMM  Neck: supple; no JVD or thyromegaly  Respiratory: CTA B/L; no W/R/R, no retractions  Cardiac: +S1/S2; RRR; no M/R/G; PMI non-displaced  Gastrointestinal: Mild tenderness in epigastrum, RUQ and LUQ, No rebound or guarding, no HSM  Extremities: WWP, no clubbing or cyanosis; no peripheral edema  Musculoskeletal: NROM x4; no joint swelling, tenderness or erythema  Neurologic: AAOx3; CNII-XII grossly intact; no focal deficits  Psychiatric: affect and characteristics of appearance, verbalizations, behaviors are appropriate

## 2017-06-11 NOTE — ED ADULT TRIAGE NOTE - CHIEF COMPLAINT QUOTE
Pt c/o abd pain with N/V, recently dc this AM & returns for worsening symptoms. Also c/o HA, vision changes.

## 2017-06-11 NOTE — H&P ADULT - ASSESSMENT
60 y/o F PMH DM2 (A1C 7.5 5/17), CHFrEF, HTN, thyroid CA s/p thyroidectomy c/b persistent hypocalcemia, Cholelithiasis p/w persistent abdominal pain, nausea and vomiting. Patient has come to hospital, several ED visits and multiple medical admissions, in past month for similar symptoms. On current admission patient also has metabolic derangements of MAGNUS, hyponatremia likely in setting of dehydration from nausea and vomiting.

## 2017-06-11 NOTE — H&P ADULT - ATTENDING COMMENTS
Seen and examined. known to my service on last admission. [place] better for few days and then started throwing up again with abdominal pain.  No fever and chills. On exam . Vitals stable with no fever or RUQ tenderness. Labs and imaging noted so  GI, surgery and Renal consulted . Will consult house Cardiology also.

## 2017-06-11 NOTE — ED PROVIDER NOTE - PROGRESS NOTE DETAILS
Ca noted to be elevated, given calcium, also d/w surgery who feels pain is not related to gall bladder, will admit for pain control, inability to tolerate PO and ?GI vs srugery re gall bladder Ivan: Pt resting comfortably, states her abdominal pain is still present but has improved. No N/V, fever or chills. Awaiting final disposition.

## 2017-06-11 NOTE — H&P ADULT - PROBLEM SELECTOR PLAN 2
TTE in 5/17 showed severe LV dysfunction with mod pulm HTN  On ACE and Torsemide outpt- will hold both in setting of MAGNUS and restart when resolved  Was recommended to have AICD evaluation outpt but did not follow up- Cards will see pt for further evaluation

## 2017-06-11 NOTE — ED PROVIDER NOTE - ATTENDING CONTRIBUTION TO CARE
Seen and examined, mild distress after meds in ED, anicteric, clear lungs, soft abd, tender RUQ, no marques/guarding, no CVAT, no edema, NT calves. Pt. with mult. episodes of food-related vomiting and RUQ pains, +alfred for gallstones prev., also concern for gastroparesis or congestive hepatopathy. Seen in May for same c/o and after workup, was following up with GI, had neg. endoscopy. Seen more recently by surgery who felt not a gallbladder issue but now inc. vomiting and pain so returns to ED.

## 2017-06-11 NOTE — H&P ADULT - PROBLEM SELECTOR PLAN 1
Patient has had persistent abd pain for past month with workup including several abd u/s and CT abd significant for cholelithiasis w/out cholecystitis, negative HIDA scan, MRCP significant for cholecystitis and EGD significant for gastritis   -Sx likely 2/2 gastroparesis exacerbations with components of hypocalcemia + congestive hepatopathy   -C/w Reglan for nausea control, will attempt to avoid opiates as they should not be used for gastroparesis  -Reconsult GI for further recs on necessary workup

## 2017-06-12 DIAGNOSIS — I42.8 OTHER CARDIOMYOPATHIES: ICD-10-CM

## 2017-06-12 DIAGNOSIS — I50.23 ACUTE ON CHRONIC SYSTOLIC (CONGESTIVE) HEART FAILURE: ICD-10-CM

## 2017-06-12 LAB
ALBUMIN SERPL ELPH-MCNC: 4 G/DL — SIGNIFICANT CHANGE UP (ref 3.3–5)
ALP SERPL-CCNC: 209 U/L — HIGH (ref 40–120)
ALT FLD-CCNC: 207 U/L — HIGH (ref 4–33)
AST SERPL-CCNC: 197 U/L — HIGH (ref 4–32)
BASOPHILS # BLD AUTO: 0.04 K/UL — SIGNIFICANT CHANGE UP (ref 0–0.2)
BASOPHILS NFR BLD AUTO: 0.4 % — SIGNIFICANT CHANGE UP (ref 0–2)
BILIRUB SERPL-MCNC: 2.4 MG/DL — HIGH (ref 0.2–1.2)
BUN SERPL-MCNC: 22 MG/DL — SIGNIFICANT CHANGE UP (ref 7–23)
BUN SERPL-MCNC: 23 MG/DL — SIGNIFICANT CHANGE UP (ref 7–23)
CALCIUM SERPL-MCNC: 6.1 MG/DL — CRITICAL LOW (ref 8.4–10.5)
CALCIUM SERPL-MCNC: 6.3 MG/DL — CRITICAL LOW (ref 8.4–10.5)
CHLORIDE SERPL-SCNC: 82 MMOL/L — LOW (ref 98–107)
CHLORIDE SERPL-SCNC: 85 MMOL/L — LOW (ref 98–107)
CK MB BLD-MCNC: 0.6 — SIGNIFICANT CHANGE UP (ref 0–2.5)
CK MB BLD-MCNC: 0.7 — SIGNIFICANT CHANGE UP (ref 0–2.5)
CK MB BLD-MCNC: 5.04 NG/ML — HIGH (ref 1–4.7)
CK MB BLD-MCNC: 5.25 NG/ML — HIGH (ref 1–4.7)
CK SERPL-CCNC: 801 U/L — HIGH (ref 25–170)
CK SERPL-CCNC: 806 U/L — HIGH (ref 25–170)
CO2 SERPL-SCNC: 22 MMOL/L — SIGNIFICANT CHANGE UP (ref 22–31)
CO2 SERPL-SCNC: 23 MMOL/L — SIGNIFICANT CHANGE UP (ref 22–31)
CREAT SERPL-MCNC: 1.32 MG/DL — HIGH (ref 0.5–1.3)
CREAT SERPL-MCNC: 1.39 MG/DL — HIGH (ref 0.5–1.3)
EOSINOPHIL # BLD AUTO: 0.06 K/UL — SIGNIFICANT CHANGE UP (ref 0–0.5)
EOSINOPHIL NFR BLD AUTO: 0.6 % — SIGNIFICANT CHANGE UP (ref 0–6)
GLUCOSE SERPL-MCNC: 101 MG/DL — HIGH (ref 70–99)
GLUCOSE SERPL-MCNC: 220 MG/DL — HIGH (ref 70–99)
HBA1C BLD-MCNC: 6.8 % — HIGH (ref 4–5.6)
HCT VFR BLD CALC: 37.6 % — SIGNIFICANT CHANGE UP (ref 34.5–45)
HGB BLD-MCNC: 12.7 G/DL — SIGNIFICANT CHANGE UP (ref 11.5–15.5)
IMM GRANULOCYTES NFR BLD AUTO: 0.4 % — SIGNIFICANT CHANGE UP (ref 0–1.5)
LYMPHOCYTES # BLD AUTO: 3.25 K/UL — SIGNIFICANT CHANGE UP (ref 1–3.3)
LYMPHOCYTES # BLD AUTO: 31.4 % — SIGNIFICANT CHANGE UP (ref 13–44)
MCHC RBC-ENTMCNC: 25.6 PG — LOW (ref 27–34)
MCHC RBC-ENTMCNC: 33.8 % — SIGNIFICANT CHANGE UP (ref 32–36)
MCV RBC AUTO: 75.7 FL — LOW (ref 80–100)
MONOCYTES # BLD AUTO: 1.02 K/UL — HIGH (ref 0–0.9)
MONOCYTES NFR BLD AUTO: 9.9 % — SIGNIFICANT CHANGE UP (ref 2–14)
NEUTROPHILS # BLD AUTO: 5.93 K/UL — SIGNIFICANT CHANGE UP (ref 1.8–7.4)
NEUTROPHILS NFR BLD AUTO: 57.3 % — SIGNIFICANT CHANGE UP (ref 43–77)
NT-PROBNP SERPL-SCNC: 2278 PG/ML — SIGNIFICANT CHANGE UP
PLATELET # BLD AUTO: 301 K/UL — SIGNIFICANT CHANGE UP (ref 150–400)
PMV BLD: 11 FL — SIGNIFICANT CHANGE UP (ref 7–13)
POTASSIUM SERPL-MCNC: 4.3 MMOL/L — SIGNIFICANT CHANGE UP (ref 3.5–5.3)
POTASSIUM SERPL-MCNC: 4.4 MMOL/L — SIGNIFICANT CHANGE UP (ref 3.5–5.3)
POTASSIUM SERPL-SCNC: 4.3 MMOL/L — SIGNIFICANT CHANGE UP (ref 3.5–5.3)
POTASSIUM SERPL-SCNC: 4.4 MMOL/L — SIGNIFICANT CHANGE UP (ref 3.5–5.3)
PROT SERPL-MCNC: 7 G/DL — SIGNIFICANT CHANGE UP (ref 6–8.3)
RBC # BLD: 4.97 M/UL — SIGNIFICANT CHANGE UP (ref 3.8–5.2)
RBC # FLD: 16.2 % — HIGH (ref 10.3–14.5)
SODIUM SERPL-SCNC: 127 MMOL/L — LOW (ref 135–145)
SODIUM SERPL-SCNC: 129 MMOL/L — LOW (ref 135–145)
TROPONIN T SERPL-MCNC: < 0.06 NG/ML — SIGNIFICANT CHANGE UP (ref 0–0.06)
TROPONIN T SERPL-MCNC: < 0.06 NG/ML — SIGNIFICANT CHANGE UP (ref 0–0.06)
URATE SERPL-MCNC: 12.4 MG/DL — HIGH (ref 2.5–7)
WBC # BLD: 10.34 K/UL — SIGNIFICANT CHANGE UP (ref 3.8–10.5)
WBC # FLD AUTO: 10.34 K/UL — SIGNIFICANT CHANGE UP (ref 3.8–10.5)

## 2017-06-12 PROCEDURE — 71010: CPT | Mod: 26

## 2017-06-12 PROCEDURE — 93010 ELECTROCARDIOGRAM REPORT: CPT | Mod: 77

## 2017-06-12 PROCEDURE — 93010 ELECTROCARDIOGRAM REPORT: CPT

## 2017-06-12 RX ORDER — CALCIUM GLUCONATE 100 MG/ML
1 VIAL (ML) INTRAVENOUS ONCE
Qty: 0 | Refills: 0 | Status: COMPLETED | OUTPATIENT
Start: 2017-06-12 | End: 2017-06-12

## 2017-06-12 RX ORDER — PANTOPRAZOLE SODIUM 20 MG/1
40 TABLET, DELAYED RELEASE ORAL
Qty: 0 | Refills: 0 | Status: DISCONTINUED | OUTPATIENT
Start: 2017-06-12 | End: 2017-06-14

## 2017-06-12 RX ORDER — FUROSEMIDE 40 MG
40 TABLET ORAL ONCE
Qty: 0 | Refills: 0 | Status: COMPLETED | OUTPATIENT
Start: 2017-06-12 | End: 2017-06-12

## 2017-06-12 RX ORDER — URSODIOL 250 MG/1
300 TABLET, FILM COATED ORAL EVERY 8 HOURS
Qty: 0 | Refills: 0 | Status: DISCONTINUED | OUTPATIENT
Start: 2017-06-12 | End: 2017-06-14

## 2017-06-12 RX ADMIN — Medication 10 MILLIGRAM(S): at 18:46

## 2017-06-12 RX ADMIN — Medication 200 GRAM(S): at 20:57

## 2017-06-12 RX ADMIN — Medication 200 GRAM(S): at 17:39

## 2017-06-12 RX ADMIN — URSODIOL 300 MILLIGRAM(S): 250 TABLET, FILM COATED ORAL at 13:47

## 2017-06-12 RX ADMIN — Medication 1 GRAM(S): at 06:00

## 2017-06-12 RX ADMIN — Medication 1 TABLET(S): at 05:59

## 2017-06-12 RX ADMIN — CARVEDILOL PHOSPHATE 3.12 MILLIGRAM(S): 80 CAPSULE, EXTENDED RELEASE ORAL at 17:41

## 2017-06-12 RX ADMIN — ERGOCALCIFEROL 50000 UNIT(S): 1.25 CAPSULE ORAL at 12:57

## 2017-06-12 RX ADMIN — Medication 2: at 17:48

## 2017-06-12 RX ADMIN — URSODIOL 300 MILLIGRAM(S): 250 TABLET, FILM COATED ORAL at 22:22

## 2017-06-12 RX ADMIN — CALCITRIOL 0.5 MICROGRAM(S): 0.5 CAPSULE ORAL at 06:00

## 2017-06-12 RX ADMIN — Medication 40 MILLIGRAM(S): at 17:33

## 2017-06-12 RX ADMIN — PANTOPRAZOLE SODIUM 40 MILLIGRAM(S): 20 TABLET, DELAYED RELEASE ORAL at 12:47

## 2017-06-12 RX ADMIN — Medication 81 MILLIGRAM(S): at 12:41

## 2017-06-12 RX ADMIN — HEPARIN SODIUM 5000 UNIT(S): 5000 INJECTION INTRAVENOUS; SUBCUTANEOUS at 21:31

## 2017-06-12 RX ADMIN — HEPARIN SODIUM 5000 UNIT(S): 5000 INJECTION INTRAVENOUS; SUBCUTANEOUS at 13:47

## 2017-06-12 RX ADMIN — HEPARIN SODIUM 5000 UNIT(S): 5000 INJECTION INTRAVENOUS; SUBCUTANEOUS at 06:00

## 2017-06-12 RX ADMIN — Medication 200 MICROGRAM(S): at 06:00

## 2017-06-12 RX ADMIN — CARVEDILOL PHOSPHATE 3.12 MILLIGRAM(S): 80 CAPSULE, EXTENDED RELEASE ORAL at 06:00

## 2017-06-12 RX ADMIN — Medication 1 TABLET(S): at 12:41

## 2017-06-12 NOTE — PROVIDER CONTACT NOTE (OTHER) - ASSESSMENT
O2 sat 100% on room air. Pt feels need to sit up in order to breathe better. States chest feels tight. Lung sounds clear.

## 2017-06-12 NOTE — CONSULT NOTE ADULT - SUBJECTIVE AND OBJECTIVE BOX
HISTORY OF PRESENT ILLNESS:  Pearl Carson is a 61-year-old female with past medical history of hypertension, hyperlipidemia, chronic RBBB,  obesity, thyroid CA, history of nonischemic cardiomyopathy with an ejection fraction of about 25%and normal coronaries on a cardiac catheterization performed in 2014.  The patient states she wore a LifeVest for three months, however, after that time it was determined that she did not require a defibrillator. She follows with Dr. Xander Ji for cardiology. She was admitted last month with c/o RUQ discomfort and transaminitis and had an EGD that revealed gastritis. Her TTE at that time did reveal persistent LV dysfunction with an EF of 22%. Despite continuous symptoms of NYHA CHF, she refused ICD.   She is now readmittied with persistent aching abdominal discomfort with no evidence of acute cholecystitis on CT a/p or on abd  u/s. Surgery evaluation has been noted, and no acute surgical intervention has been deemed necessary at this time (though a GI consult was recommended).  She does admit to chronic EDDY (unchanged for many years) with no complaints of any active anginal symptoms.       PAST MEDICAL & SURGICAL HISTORY:  Asthma  CHF (congestive heart failure): hypothyroid  DM (diabetes mellitus)  HTN (hypertension)  Thyroid ca  S/P thyroidectomy    	    MEDICATIONS:  heparin  Injectable 5000Unit(s) SubCutaneous every 8 hours  aspirin enteric coated 81milliGRAM(s) Oral daily  carvedilol 3.125milliGRAM(s) Oral every 12 hours  metoclopramide Injectable 10milliGRAM(s) IV Push every 6 hours PRN  aluminum hydroxide/magnesium hydroxide/simethicone Suspension 30milliLiter(s) Oral every 4 hours PRN  sucralfate 1Gram(s) Oral two times a day  pantoprazole    Tablet 40milliGRAM(s) Oral before breakfast  ursodiol Capsule 300milliGRAM(s) Oral every 8 hours    levothyroxine 200MICROGram(s) Oral daily  insulin lispro (HumaLOG) corrective regimen sliding scale  SubCutaneous three times a day before meals  dextrose Gel 1Dose(s) Oral once PRN  dextrose 50% Injectable 12.5Gram(s) IV Push once  dextrose 50% Injectable 25Gram(s) IV Push once  dextrose 50% Injectable 25Gram(s) IV Push once  glucagon  Injectable 1milliGRAM(s) IntraMuscular once PRN    lactated ringers. 1000milliLiter(s) IV Continuous <Continuous>  ergocalciferol [id number](s) Oral every week  calcium carbonate 1250 mG (OsCal) 1Tablet(s) Oral four times a day  dextrose 5%. 1000milliLiter(s) IV Continuous <Continuous>  calcitriol   Capsule 0.5MICROGram(s) Oral two times a day      Allergies    penicillin (Rash)    Intolerances        FAMILY HISTORY:  No pertinent family history in first degree relatives      SOCIAL HISTORY:    [ ] Non-smoker  [ ] Smoker  [ ] Alcohol      REVIEW OF SYSTEMS:        CONSTITUTIONAL: No fever, weight loss, or fatigue  EYES: No eye pain, visual disturbances, or discharge  ENMT:  No difficulty hearing, tinnitus, vertigo; No sinus or throat pain  NECK: No pain or stiffness  RESPIRATORY: No cough, wheezing, chills or hemoptysis; CHRONIC EDDY  CARDIOVASCULAR: No chest pain, palpitations, passing out, dizziness, or leg swelling  GASTROINTESTINAL: +epigastric pain. No nausea, vomiting, or hematemesis; No diarrhea or constipation. No melena or hematochezia.  GENITOURINARY: No dysuria, frequency, hematuria, or incontinence  NEUROLOGICAL: No headaches, memory loss, loss of strength, numbness, or tremors  SKIN: No itching, burning, rashes, or lesions   LYMPH Nodes: No enlarged glands  ENDOCRINE: No heat or cold intolerance; No hair loss  MUSCULOSKELETAL: No joint pain or swelling; No muscle, back, or extremity pain  PSYCHIATRIC: No depression, anxiety, mood swings, or difficulty sleeping  HEME/LYMPH: No easy bruising, or bleeding gums  ALLERY AND IMMUNOLOGIC: No hives or eczema	    [ ] All others negative	  [ ] Unable to obtain    PHYSICAL EXAM:  T(C): 36.4, Max: 37.2 (06-11 @ 12:13)  HR: 84 (77 - 87)  BP: 113/82 (104/67 - 130/90)  RR: 18 (18 - 20)  SpO2: 100% (98% - 100%)  Wt(kg): --  I&O's Summary      Appearance: Normal	  HEENT:   Normal oral mucosa, PERRL, EOMI	  Lymphatic: No lymphadenopathy  Cardiovascular: Normal S1 S2, No JVD, No murmurs, No edema  Respiratory: Lungs clear to auscultation	  Psychiatry: A & O x 3, Mood & affect appropriate  Gastrointestinal:  Soft, Non-tender, + BS	  Skin: No rashes, No ecchymoses, No cyanosis	  Neurologic: Non-focal  Extremities: Normal range of motion, No clubbing, cyanosis or edema  Vascular: Peripheral pulses palpable 2+ bilaterally    TELEMETRY: no tele   	    ECG:  	NSR RBBB    RADIOLOGY: ct/ a p no non specific GB wall thickening   abd u/s - no acute tin     	    CARDIAC MARKERS:      CKMB: 5.04 ng/mL (06-12 @ 03:00)    CKMB Relative Index: 0.6 (06-12 @ 03:00)                            12.7   10.34 )-----------( 301      ( 12 Jun 2017 03:10 )             37.6       06-12    129<L>  |  85<L>  |  23  ----------------------------<  101<H>  4.3   |  22  |  1.39<H>    Ca    6.1<LL>      12 Jun 2017 03:00  Phos  5.2     06-11  Mg     1.6     06-11    TPro  7.0  /  Alb  4.0  /  TBili  2.4<H>  /  DBili  x   /  AST  197<H>  /  ALT  207<H>  /  AlkPhos  209<H>  06-12      proBNP:     Lipid Profile:     HgA1c: Hemoglobin A1C, Whole Blood: 6.8 % (06-12 @ 03:10)      TSH:     ASSESSMENT/PLAN: 	Pearl Carson is a 61-year-old female with past medical history of hypertension, hyperlipidemia, chronic RBBB,  obesity, thyroid CA, history of nonischemic cardiomyopathy with an ejection fraction of about 25%and normal coronaries on a cardiac catheterization performed in 2014.  The patient states she wore a LifeVest for three months, however, after that time it was determined that she did not require a defibrillator. She follows with Dr. Xander Ji for cardiology. She was admitted last month with c/o RUQ discomfort and transaminitis and had an EGD that revealed gastritis. Her TTE at that time did reveal persistent LV dysfunction with an EF of 22%. Despite continuous symptoms of NYHA CHF, she refused ICD.   She is now readmittied with persistent aching abdominal discomfort with no evidence of acute cholecystitis on CT a/p or on abd  u/s. Surgery evaluation has been noted, and no acute surgical intervention has been deemed necessary at this time (though a GI consult was recommended).  She does admit to chronic EDDY (unchanged for many years) with no complaints of any active anginal symptoms.    She was found to have MAGNUS on admission.   -  The patient has ruled out for acute coronary syndrome with negative serial cardiac enzymes  - There is no acute ischemia on EKG  - The patient should be continued on Coreg for h/o NICM - her diuretics and Lisinopril are on hold secondary to MAGNUS  - f/u renal  - EP follow up regarding ICD   - at this time no need for repeat ischemic evaluation or repeat TTE as patient with no anginal symptoms  - f/u GI eval --> possible gastritis vs gastroparesis  - no acute surg intervention per surgery eval  - d/w primary team

## 2017-06-12 NOTE — CONSULT NOTE ADULT - SUBJECTIVE AND OBJECTIVE BOX
EP ATTENDING    HISTORY OF PRESENT ILLNESS:  Ms. Carson is a pleasant 61-year-old female with past medical history of hypertension, hyperlipidemia, obesity, history of nonischemic cardiomyopathy with an ejection fraction of about 25% and normal coronaries on a cardiac catheterization performed in 2014.  Despite greater than 9 months of optimal medical therapy she continues to have NYHA Class III CHF and a persistently reduced LVEF. In addition her EKG shows bifascicular block with QRS > 150 ms. She denies syncope or angina      PAST MEDICAL & SURGICAL HISTORY:  Asthma  CHF (congestive heart failure) from dilated non-ischemic cardiomyopathy  hypothyroidism  DM (diabetes mellitus)  HTN (hypertension)  Thyroid ca  S/P thyroidectomy        MEDICATIONS  (STANDING):  heparin  Injectable 5000Unit(s) SubCutaneous every 8 hours  lactated ringers. 1000milliLiter(s) IV Continuous <Continuous>  sucralfate 1Gram(s) Oral two times a day  aspirin enteric coated 81milliGRAM(s) Oral daily  carvedilol 3.125milliGRAM(s) Oral every 12 hours  levothyroxine 200MICROGram(s) Oral daily  ergocalciferol 60144Koes(s) Oral every week  calcium carbonate 1250 mG (OsCal) 1Tablet(s) Oral four times a day  insulin lispro (HumaLOG) corrective regimen sliding scale  SubCutaneous three times a day before meals  dextrose 5%. 1000milliLiter(s) IV Continuous <Continuous>  dextrose 50% Injectable 12.5Gram(s) IV Push once  dextrose 50% Injectable 25Gram(s) IV Push once  dextrose 50% Injectable 25Gram(s) IV Push once  calcitriol   Capsule 0.5MICROGram(s) Oral two times a day  pantoprazole    Tablet 40milliGRAM(s) Oral before breakfast  ursodiol Capsule 300milliGRAM(s) Oral every 8 hours  dicyclomine 10milliGRAM(s) Oral three times a day before meals    Allergies penicillin (Rash)    FAMILY HISTORY:  No pertinent family history in first degree relatives    Non-contributary for premature coronary disease or sudden cardiac death    SOCIAL HISTORY:    [x ] Non-smoker  [ ] Smoker  [ ] Alcohol      REVIEW OF SYSTEMS:  [ ]chest pain  [ x ]shortness of breath  [  ]palpitations  [  ]syncope  [ ]near syncope [ ]upper extremity weakness   [ ] lower extremity weakness  [  ]diplopia  [  ]altered mental status   [  ]fevers  [ ]chills [ ]nausea  [ ]vomitting  [  ]dysphagia    [x ]abdominal pain  [ ]melena  [ ]BRBPR    [  ]epistaxis  [  ]rash    [x ]lower extremity edema        [x ] All others negative	  [ ] Unable to obtain    PHYSICAL EXAM:  T(C): 36.4, Max: 36.7 (06-11 @ 21:19)  HR: 84 (77 - 87)  BP: 113/82 (113/82 - 130/90)  RR: 18 (18 - 19)  SpO2: 100% (100% - 100%)  Wt(kg): --    Appearance: Normal	  HEENT:   Normal oral mucosa, PERRL, EOMI	  Lymphatic: No lymphadenopathy , no edema  Cardiovascular: Normal S1 S2, No JVD, No murmurs , Peripheral pulses palpable 2+ bilaterally  Respiratory: Lungs clear to auscultation, normal effort 	  Gastrointestinal:  Soft, Non-tender, + BS	  Skin: No rashes, No ecchymoses, No cyanosis, warm to touch  Musculoskeletal: Normal range of motion, normal strength  Psychiatry:  Mood & affect appropriate      TELEMETRY: none  ECG: NSR, bifascicular block,  ms    Echo: LVEF 22%  NST: n/a  Cath: normal cors  	  	  LABS:	 	                          12.7   10.34 )-----------( 301      ( 12 Jun 2017 03:10 )             37.6     06-12    129<L>  |  85<L>  |  23  ----------------------------<  101<H>  4.3   |  22  |  1.39<H>    Ca    6.1<LL>      12 Jun 2017 03:00  Phos  5.2     06-11  Mg     1.6     06-11    TPro  7.0  /  Alb  4.0  /  TBili  2.4<H>  /  DBili  x   /  AST  197<H>  /  ALT  207<H>  /  AlkPhos  209<H>  06-12    proBNP:   Lipid Profile:   HgA1c: Hemoglobin A1C, Whole Blood: 6.8 % (06-12 @ 03:10)    TSH:     A/P) Ms. Carson is a pleasant 61-year-old female with past medical history of hypertension, hyperlipidemia, obesity, history of nonischemic cardiomyopathy with an ejection fraction of about 25% and normal coronaries on a cardiac catheterization performed in 2014.  Despite greater than 9 months of optimal medical therapy she continues to have NYHA Class III CHF and a persistently reduced LVEF. In addition her EKG shows bifascicular block with QRS > 150 ms. She denies syncope or angina    -given the above I recommended a BiV-ICD. I cited a 3% risk of infection or bleeding and a 1% risk of major complication including but not limited to heart attack, stroke, death or need for emergency open heart surgery or pericardiocentesis. Understanding her R/B/A she is refusing BiV-ICD implantation at this time. She fully understands the risks of refusal, including preventible sudden cardiac death or life disabling syncope with injury. I provided my contact information as well as informative literature, and instructed her to contact me if she and her  wish to pursue BiV-ICD implantation.      Dariana Hallman M.D., [facility]  [phone]

## 2017-06-12 NOTE — PROGRESS NOTE ADULT - SUBJECTIVE AND OBJECTIVE BOX
INTERVAL HPI/OVERNIGHT EVENTS:Seen and examined. Feeling better .  Vital Signs Last 24 Hrs  T(C): 36.4, Max: 37.2 (06-11 @ 12:13)  T(F): 97.6, Max: 98.9 (06-11 @ 12:13)  HR: 84 (77 - 87)  BP: 113/82 (104/67 - 130/90)  BP(mean): --  RR: 18 (18 - 20)  SpO2: 100% (98% - 100%)  I&O's Summary    MEDICATIONS  (STANDING):  heparin  Injectable 5000Unit(s) SubCutaneous every 8 hours  lactated ringers. 1000milliLiter(s) IV Continuous <Continuous>  sucralfate 1Gram(s) Oral two times a day  aspirin enteric coated 81milliGRAM(s) Oral daily  carvedilol 3.125milliGRAM(s) Oral every 12 hours  levothyroxine 200MICROGram(s) Oral daily  ergocalciferol 64700Aizy(s) Oral every week  calcium carbonate 1250 mG (OsCal) 1Tablet(s) Oral four times a day  insulin lispro (HumaLOG) corrective regimen sliding scale  SubCutaneous three times a day before meals  dextrose 5%. 1000milliLiter(s) IV Continuous <Continuous>  dextrose 50% Injectable 12.5Gram(s) IV Push once  dextrose 50% Injectable 25Gram(s) IV Push once  dextrose 50% Injectable 25Gram(s) IV Push once  calcitriol   Capsule 0.5MICROGram(s) Oral two times a day  pantoprazole    Tablet 40milliGRAM(s) Oral before breakfast  ursodiol Capsule 300milliGRAM(s) Oral every 8 hours    MEDICATIONS  (PRN):  metoclopramide Injectable 10milliGRAM(s) IV Push every 6 hours PRN Nausea and/or Vomiting  aluminum hydroxide/magnesium hydroxide/simethicone Suspension 30milliLiter(s) Oral every 4 hours PRN Dyspepsia  dextrose Gel 1Dose(s) Oral once PRN Blood Glucose LESS THAN 70 milliGRAM(s)/deciliter  glucagon  Injectable 1milliGRAM(s) IntraMuscular once PRN Glucose LESS THAN 70 milligrams/deciliter    LABS:                        12.7   10.34 )-----------( 301      ( 12 Jun 2017 03:10 )             37.6     06-12    129<L>  |  85<L>  |  23  ----------------------------<  101<H>  4.3   |  22  |  1.39<H>    Ca    6.1<LL>      12 Jun 2017 03:00  Phos  5.2     06-11  Mg     1.6     06-11    TPro  7.0  /  Alb  4.0  /  TBili  2.4<H>  /  DBili  x   /  AST  197<H>  /  ALT  207<H>  /  AlkPhos  209<H>  06-12        CAPILLARY BLOOD GLUCOSE  94 (12 Jun 2017 08:55)  173 (11 Jun 2017 21:10)  186 (11 Jun 2017 17:21)  158 (11 Jun 2017 13:22)          REVIEW OF SYSTEMS:  CONSTITUTIONAL: No fever, weight loss, or fatigue  EYES: No eye pain, visual disturbances, or discharge  ENMT:  No difficulty hearing, tinnitus, vertigo; No sinus or throat pain  NECK: No pain or stiffness  BREASTS: No pain, masses, or nipple discharge  RESPIRATORY: No cough, wheezing, chills or hemoptysis; No shortness of breath  CARDIOVASCULAR: No chest pain, palpitations, dizziness, or leg swelling  GASTROINTESTINAL: No abdominal or epigastric pain. No nausea, vomiting, or hematemesis; No diarrhea or constipation. No melena or hematochezia.  GENITOURINARY: No dysuria, frequency, hematuria, or incontinence  NEUROLOGICAL: No headaches, memory loss, loss of strength, numbness, or tremors  SKIN: No itching, burning, rashes, or lesions   LYMPH NODES: No enlarged glands  ENDOCRINE: No heat or cold intolerance; No hair loss  MUSCULOSKELETAL: No joint pain or swelling; No muscle, back, or extremity pain  PSYCHIATRIC: No depression, anxiety, mood swings, or difficulty sleeping  HEME/LYMPH: No easy bruising, or bleeding gums  ALLERY AND IMMUNOLOGIC: No hives or eczema    RADIOLOGY & ADDITIONAL TESTS:    Imaging Personally Reviewed:  [ ] YES  [ ] NO    Consultant(s) Notes Reviewed:  [x ] YES  [ ] NO    PHYSICAL EXAM:  GENERAL: NAD, well-groomed, well-developed  HEAD:  Atraumatic, Normocephalic  EYES: EOMI, PERRLA, conjunctiva and sclera clear  ENMT: No tonsillar erythema, exudates, or enlargement; Moist mucous membranes, Good dentition, No lesions  NECK: Supple, No JVD, Normal thyroid  NERVOUS SYSTEM:  Alert & Oriented X3, Good concentration; Motor Strength 5/5 B/L upper and lower extremities; DTRs 2+ intact and symmetric  CHEST/LUNG: Clear to percussion bilaterally; No rales, rhonchi, wheezing, or rubs  HEART: Regular rate and rhythm; No murmurs, rubs, or gallops  ABDOMEN: Soft, Nontender, Nondistended; Bowel sounds present  EXTREMITIES:  2+ Peripheral Pulses, No clubbing, cyanosis, or edema  LYMPH: No lymphadenopathy noted  SKIN: No rashes or lesions    Care Discussed with Consultants/Other Providers [ x] YES  [ ] NO

## 2017-06-12 NOTE — CONSULT NOTE ADULT - SUBJECTIVE AND OBJECTIVE BOX
Chief Complaint:  Patient is a 61y old  Female who presents with a chief complaint of Abdominal pain (2017 12:31)    Asthma  CHF (congestive heart failure)  DM (diabetes mellitus)  HTN (hypertension)  Thyroid ca  S/P thyroidectomy     HPI:  60 y/o F PMH DM2 (A1C 7.5 ), CHFrEF,  thyroid CA s/p thyroidectomy c/b persistent hypocalcemia, hypertension, hyperlipidemia, chronic RBBB,  obesity, thyroid CA, history of nonischemic cardiomyopathy with an ejection fraction of about 25%and normal coronaries on a cardiac catheterization performed in .  The patient states she wore a LifeVest for three months, however, after that time it was determined that she did not require a defibrillator Cholelithiasis p/w abdominal pain. Has been having persistent abdominal pain for past month with multiple ED visits, last yesterday, and one admission  in  for similar symptoms. She state pain is present in the upper abdomen R>L. She notes that it is a constant pain, dull throughout most of the day but worsens when she tries to eat but is often unable to keep any food down. She has been having nausea and vomiting throughout the duration of the abdominal pain but has occurred more frequently lately. On ROS she notes she intermittently has SOB while walking and has not had any episodes of vomiting since Saturday and nausea is improved as well as the abdominal pain. Denies fever, chest pain, headache, constipation, diarrhea, melena, brbpr, hematemesis. Pt had an EGD by Dr. Shabazz 2017 showing LA Grade B reflux esophagitis. Acute gastritis and erythematous duodenopathy. In ED VS T 98.8 HR 88 /77. Labs significant for Cr of 1.59, ca 5.6, Tbili 1.7 w/ elevated alk phos and transaminitis.       penicillin (Rash)      metoclopramide Injectable 10milliGRAM(s) IV Push every 6 hours PRN  aluminum hydroxide/magnesium hydroxide/simethicone Suspension 30milliLiter(s) Oral every 4 hours PRN  heparin  Injectable 5000Unit(s) SubCutaneous every 8 hours  lactated ringers. 1000milliLiter(s) IV Continuous <Continuous>  sucralfate 1Gram(s) Oral two times a day  aspirin enteric coated 81milliGRAM(s) Oral daily  carvedilol 3.125milliGRAM(s) Oral every 12 hours  levothyroxine 200MICROGram(s) Oral daily  ergocalciferol 25437Rzmc(s) Oral every week  calcium carbonate 1250 mG (OsCal) 1Tablet(s) Oral four times a day  insulin lispro (HumaLOG) corrective regimen sliding scale  SubCutaneous three times a day before meals  dextrose 5%. 1000milliLiter(s) IV Continuous <Continuous>  dextrose Gel 1Dose(s) Oral once PRN  dextrose 50% Injectable 12.5Gram(s) IV Push once  dextrose 50% Injectable 25Gram(s) IV Push once  dextrose 50% Injectable 25Gram(s) IV Push once  glucagon  Injectable 1milliGRAM(s) IntraMuscular once PRN  calcitriol   Capsule 0.5MICROGram(s) Oral two times a day  pantoprazole    Tablet 40milliGRAM(s) Oral before breakfast  ursodiol Capsule 300milliGRAM(s) Oral every 8 hours        FAMILY HISTORY:  No pertinent family history in first degree relatives        Review of Systems:    General:  No wt loss, fevers, chills, night sweats,fatigue,   Eyes:  Good vision, no reported pain  ENT:  No sore throat, pain, runny nose, dysphagia  CV:  No pain, palpitatioins, hypo/hypertension  Resp:  No dyspnea, cough, tachypnea, wheezing  GI: +nausea, +bloating/abd pain R>L, vomiting at home   :  No pain, bleeding, incontinence, nocturia  Muscle:  No pain, weakness  Neuro:  No weakness, tingling, memory problems  Psych:  No fatigue, insomnia, mood problems, depression  Endocrine:  No polyuria, polydypsia, cold/heat intolerance  Heme:  No petechiae, ecchymosis, easy bruisability  Skin:  No rash, tattoos, scars, edema    Relevant Family History:       Relevant Social History:       Physical Exam:    Vital Signs:  Vital Signs Last 24 Hrs  T(C): 36.4, Max: 37.2 ( @ 12:13)  T(F): 97.6, Max: 98.9 ( @ 12:13)  HR: 84 (77 - 87)  BP: 113/82 (104/67 - 130/90)  BP(mean): --  RR: 18 (18 - 20)  SpO2: 100% (98% - 100%)  Daily Height in cm: 170.18 (2017 13:22)    Daily Weight in k.9 (2017 08:45)    General:  Appears stated age, well-groomed, well-nourished, no distressl; obese  HEENT:  NC/AT,  conjunctivae clear and pink, no thyromegaly, nodules, adenopathy, no JVD  Chest:  Full & symmetric excursion, no increased effort, breath sounds clear  Cardiovascular:  Regular rhythm, S1, S2, no murmur/rub/S3/S4, no abdominal bruit, no edema  Abdomen:  Soft, non-tender, non-distended, normoactive bowel sounds,  no masses   Extremities:  no cyanosis,clubbing or edema  Skin:  No rash/erythema/ecchymoses/petechiae/wounds/abscess/warm/dry  Neuro/Psych:  Alert, oriented, no asterixis, no tremor, no encephalopathy    Laboratory:                            12.7   10.34 )-----------( 301      ( 2017 03:10 )             37.6     06-12    129<L>  |  85<L>  |  23  ----------------------------<  101<H>  4.3   |  22  |  1.39<H>    Ca    6.1<LL>      2017 03:00  Phos  5.2     06-11  Mg     1.6     06-11    TPro  7.0  /  Alb  4.0  /  TBili  2.4<H>  /  DBili  x   /  AST  197<H>  /  ALT  207<H>  /  AlkPhos  209<H>  06-12    LIVER FUNCTIONS - ( 2017 03:00 )  Alb: 4.0 g/dL / Pro: 7.0 g/dL / ALK PHOS: 209 u/L / ALT: 207 u/L / AST: 197 u/L / GGT: x                 Imaging:    EGD 2017: LA Grade B reflux esophagitis. Acute gastritis and erythematous duodenopathy    CT Abd/Pelvis: 6/10/17  IMPRESSION: Contracted gallbladder with intraluminal stones. Nonspecific   gallbladder wall thickening or edema. Appearance is unchanged from the   prior recent CT and MRI examinations.    US Abd FINDINGS:  Liver: Hepatic steatosis.  Bile ducts: Normal caliber. Common bile duct measures 6 mm.   Gallbladder:  Evaluation is somewhat limited due to overlying bowel gas.   Nonmobilegallstones. Suggestion of mild gallbladder wall thickening.   Sonographic Davila sign cannot be assessed in the setting of pain   medication.  Pancreas: Not well visualized due to overlying bowel gas.  Right kidney: 11.6 cm. No hydronephrosis.  Ascites: None  Aorta/IVC: The visualized portions are are within normal limits.    IMPRESSION:   Overlying bowel gas somewhat limits elevation of the right upper   quadrant.     Nonmobile gallstones and suggestion of mild gallbladder wall thickening.   Sonographic Davila sign cannot be assessed in setting of pain medication. Chief Complaint:  Patient is a 61y old  Female who presents with a chief complaint of Abdominal pain (2017 12:31)    Asthma  CHF (congestive heart failure)  DM (diabetes mellitus)  HTN (hypertension)  Thyroid ca  S/P thyroidectomy     HPI:  62 y/o F PMH DM2 (A1C 7.5 ), CHFrEF,  thyroid CA s/p thyroidectomy c/b persistent hypocalcemia, hypertension, hyperlipidemia, chronic RBBB,  obesity, thyroid CA, history of nonischemic cardiomyopathy with an ejection fraction of about 25%and normal coronaries on a cardiac catheterization performed in .  The patient states she wore a LifeVest for three months, however, after that time it was determined that she did not require a defibrillator Cholelithiasis p/w abdominal pain. Has been having persistent abdominal pain for past month with multiple ED visits, last yesterday, and one admission  in  for similar symptoms. She state pain is present in the upper abdomen R>L. She notes that it is a constant pain, dull throughout most of the day but worsens when she tries to eat but is often unable to keep any food down. She has been having nausea and vomiting throughout the duration of the abdominal pain but has occurred more frequently lately. On ROS she notes she intermittently has SOB while walking and has not had any episodes of vomiting since Saturday and nausea is improved as well as the abdominal pain. Denies fever, chest pain, headache, constipation, diarrhea, melena, brbpr, hematemesis. Pt had an EGD by Dr. Shabazz 2017 showing LA Grade B reflux esophagitis. Acute gastritis and erythematous duodenopathy. In ED VS T 98.8 HR 88 /77. Labs significant for Cr of 1.59, ca 5.6, Tbili 1.7 w/ elevated alk phos and transaminitis.       penicillin (Rash)      metoclopramide Injectable 10milliGRAM(s) IV Push every 6 hours PRN  aluminum hydroxide/magnesium hydroxide/simethicone Suspension 30milliLiter(s) Oral every 4 hours PRN  heparin  Injectable 5000Unit(s) SubCutaneous every 8 hours  lactated ringers. 1000milliLiter(s) IV Continuous <Continuous>  sucralfate 1Gram(s) Oral two times a day  aspirin enteric coated 81milliGRAM(s) Oral daily  carvedilol 3.125milliGRAM(s) Oral every 12 hours  levothyroxine 200MICROGram(s) Oral daily  ergocalciferol 32381Ujcr(s) Oral every week  calcium carbonate 1250 mG (OsCal) 1Tablet(s) Oral four times a day  insulin lispro (HumaLOG) corrective regimen sliding scale  SubCutaneous three times a day before meals  dextrose 5%. 1000milliLiter(s) IV Continuous <Continuous>  dextrose Gel 1Dose(s) Oral once PRN  dextrose 50% Injectable 12.5Gram(s) IV Push once  dextrose 50% Injectable 25Gram(s) IV Push once  dextrose 50% Injectable 25Gram(s) IV Push once  glucagon  Injectable 1milliGRAM(s) IntraMuscular once PRN  calcitriol   Capsule 0.5MICROGram(s) Oral two times a day  pantoprazole    Tablet 40milliGRAM(s) Oral before breakfast  ursodiol Capsule 300milliGRAM(s) Oral every 8 hours        FAMILY HISTORY:  No pertinent family history in first degree relatives        Review of Systems:    General:  No wt loss, fevers, chills, night sweats,fatigue,   Eyes:  Good vision, no reported pain  ENT:  No sore throat, pain, runny nose, dysphagia  CV:  No pain, palpitatioins, hypo/hypertension  Resp:  No dyspnea, cough, tachypnea, wheezing  GI: +nausea, +bloating/abd pain R>L, vomiting at home   :  No pain, bleeding, incontinence, nocturia  Muscle:  No pain, weakness  Neuro:  No weakness, tingling, memory problems  Psych:  No fatigue, insomnia, mood problems, depression  Endocrine:  No polyuria, polydypsia, cold/heat intolerance  Heme:  No petechiae, ecchymosis, easy bruisability  Skin:  No rash, tattoos, scars, edema    Relevant Family History:       Relevant Social History:       Physical Exam:    Vital Signs:  Vital Signs Last 24 Hrs  T(C): 36.4, Max: 37.2 ( @ 12:13)  T(F): 97.6, Max: 98.9 ( @ 12:13)  HR: 84 (77 - 87)  BP: 113/82 (104/67 - 130/90)  BP(mean): --  RR: 18 (18 - 20)  SpO2: 100% (98% - 100%)  Daily Height in cm: 170.18 (2017 13:22)    Daily Weight in k.9 (2017 08:45)    General:  Appears stated age, well-groomed, well-nourished, no distressl; obese  HEENT:  NC/AT,  conjunctivae clear and pink, no thyromegaly, nodules, adenopathy, no JVD  Chest:  Full & symmetric excursion, no increased effort, breath sounds clear  Cardiovascular:  Regular rhythm, S1, S2, no murmur/rub/S3/S4, no abdominal bruit, no edema  Abdomen:  Soft, non-tender, non-distended, normoactive bowel sounds,  no masses   Extremities:  no cyanosis,clubbing or edema  Skin:  No rash/erythema/ecchymoses/petechiae/wounds/abscess/warm/dry  Neuro/Psych:  Alert, oriented, no asterixis, no tremor, no encephalopathy    Laboratory:                            12.7   10.34 )-----------( 301      ( 2017 03:10 )             37.6     06-12    129<L>  |  85<L>  |  23  ----------------------------<  101<H>  4.3   |  22  |  1.39<H>    Ca    6.1<LL>      2017 03:00  Phos  5.2     06-11  Mg     1.6     06-11    TPro  7.0  /  Alb  4.0  /  TBili  2.4<H>  /  DBili  x   /  AST  197<H>  /  ALT  207<H>  /  AlkPhos  209<H>  06-12    LIVER FUNCTIONS - ( 2017 03:00 )  Alb: 4.0 g/dL / Pro: 7.0 g/dL / ALK PHOS: 209 u/L / ALT: 207 u/L / AST: 197 u/L / GGT: x                 Imaging:    EGD 2017: LA Grade B reflux esophagitis. Acute gastritis and erythematous duodenopathy    CT Abd/Pelvis: 6/10/17  IMPRESSION: Contracted gallbladder with intraluminal stones. Nonspecific   gallbladder wall thickening or edema. Appearance is unchanged from the   prior recent CT and MRI examinations.    US Abd FINDINGS:  Liver: Hepatic steatosis.  Bile ducts: Normal caliber. Common bile duct measures 6 mm.   Gallbladder:  Evaluation is somewhat limited due to overlying bowel gas.   Nonmobilegallstones. Suggestion of mild gallbladder wall thickening.   Sonographic Davila sign cannot be assessed in the setting of pain   medication.  Pancreas: Not well visualized due to overlying bowel gas.  Right kidney: 11.6 cm. No hydronephrosis.  Ascites: None  Aorta/IVC: The visualized portions are are within normal limits.    IMPRESSION:   Overlying bowel gas somewhat limits elevation of the right upper   quadrant.     Nonmobile gallstones and suggestion of mild gallbladder wall thickening.   Sonographic Davila sign cannot be assessed in setting of pain medication.    MRCP   PROCEDURE:     MRI/MRCP was performed without intravenous contrast. Radial and 3D MRCP   sequences were obtained.     FINDINGS:    LOWER CHEST: Cardiomegaly.    LIVER: Within normal limits.  BILE DUCTS: No choledocholithiasis.  GALLBLADDER: Cholelithiasis. Trace pericholecystic fluid.  SPLEEN: Within normal limits.  PANCREAS: Within normal limits.  ADRENALS: Within normal limits.  KIDNEYS/URETERS: Subcentimeter cyst in the left kidney. No hydronephrosis.    VISUALIZED PORTIONS:    BOWEL: Within normal limits.   PERITONEUM: No ascites.  VESSELS: Within normal limits.  RETROPERITONEUM: No lymphadenopathy.    ABDOMINAL WALL: Within normal limits.    IMPRESSION:     Cholelithiasis. No choledocholithiasis.

## 2017-06-12 NOTE — PROGRESS NOTE ADULT - SUBJECTIVE AND OBJECTIVE BOX
No pain, no shortness of breath          VITAL:  T(C): , Max: 37.2 (06-11 @ 12:13)  T(F): , Max: 98.9 (06-11 @ 12:13)  HR: 84  BP: 113/82  BP(mean): --  RR: 18  SpO2: 100%    PHYSICAL EXAM:  Constitutional: NAD, Alert  HEENT: NCAT, MMM;  Neck: Supple, No JVD  Respiratory: CTA-b/l  Cardiovascular: RRR s1s2, no m/r/g  Gastrointestinal: BS+, soft, NT/ND  Extremities: No peripheral edema b/l  Neurological: no focal deficits; strength grossly intact; no tremor; Chvostek's negative  Psychiatric: Normal mood, normal affect    LABS:                        12.7   10.34 )-----------( 301      ( 12 Jun 2017 03:10 )             37.6     Na(129)/K(4.3)/Cl(85)/HCO3(22)/BUN(23)/Cr(1.39)Glu(101)/Ca(6.1)/Mg(--)/PO4(--)    06-12 @ 03:00  Na(127)/K(4.1)/Cl(84)/HCO3(21)/BUN(24)/Cr(1.44)Glu(167)/Ca(5.7)/Mg(1.6)/PO4(5.2)    06-11 @ 15:20  Na(129)/K(4.2)/Cl(84)/HCO3(24)/BUN(24)/Cr(1.59)Glu(205)/Ca(5.6)/Mg(1.6)/PO4(5.5)    06-11 @ 03:45  Na(133)/K(4.0)/Cl(86)/HCO3(22)/BUN(24)/Cr(1.36)Glu(180)/Ca(5.7)/Mg(--)/PO4(--)    06-10 @ 02:15    (6/12) - serum uric acid 12.4    Sodium, Random Urine: 3 meq/L (06-11 @ 17:30)  Potassium, Random Urine: 32.1 meq/L (06-11 @ 17:30)  Chloride, Random Urine: 4 mmol/L (06-11 @ 17:30)  Osmolality, Random Urine: 432 mosmo/kg (06-11 @ 17:30)      IMPRESSION:    (1)Renal - MAGNUS - prerenal +/- mild contrast nephropathy - resolving/resolved.    (2)Hypocalcemia - surgically-induced hypoparathyroidism; exacerbated by loop diuretics. Improving, with IV calcium repletion, increased calcitriol, and off loop diuretics    (3)Hyponatremia - hypovolemic - resolving off diuretics and s/p gentle volume repletion    (4)Abdominal pain - GI + Surgery on board    (5)CV - known systolic CHF - that being said, was hypovolemic on admission. Resolving/resolved.      RECOMMEND:  (1)Follow up GI + Surgery input  (2)BMP daily; IV calcium 1amp prn calcium 5.6-6.0; 2amp prn calcium <5.6  (3)Oscal/Calcitriol as ordered  (4)No IVF/No diuretics for now; eventual thiazide diuretics rather than loop diuretics, if possible, once diuretics are reinstituted  (5)1.2 liter free water restriction            Brian Henry MD  [facility]  [phone] No shortness of breath; (+)intermittent nausea and vomiting yesterday; no nausea today. No abdominal pain today.    VITAL:  T(C): , Max: 37.2 (06-11 @ 12:13)  T(F): , Max: 98.9 (06-11 @ 12:13)  HR: 84  BP: 113/82  RR: 18  SpO2: 100%    PHYSICAL EXAM:  Constitutional: NAD, Alert  HEENT: NCAT, DMM;  Neck: Supple, No JVD  Respiratory: CTA-b/l  Cardiovascular: RRR s1s2, no m/r/g  Gastrointestinal: BS+, soft, NT/ND  Extremities: No peripheral edema b/l      LABS:                        12.7   10.34 )-----------( 301      ( 12 Jun 2017 03:10 )             37.6     Na(129)/K(4.3)/Cl(85)/HCO3(22)/BUN(23)/Cr(1.39)Glu(101)/Ca(6.1)/Mg(--)/PO4(--)    06-12 @ 03:00  Na(127)/K(4.1)/Cl(84)/HCO3(21)/BUN(24)/Cr(1.44)Glu(167)/Ca(5.7)/Mg(1.6)/PO4(5.2)    06-11 @ 15:20  Na(129)/K(4.2)/Cl(84)/HCO3(24)/BUN(24)/Cr(1.59)Glu(205)/Ca(5.6)/Mg(1.6)/PO4(5.5)    06-11 @ 03:45  Na(133)/K(4.0)/Cl(86)/HCO3(22)/BUN(24)/Cr(1.36)Glu(180)/Ca(5.7)/Mg(--)/PO4(--)    06-10 @ 02:15    (6/12) - serum uric acid 12.4    Sodium, Random Urine: 3 meq/L (06-11 @ 17:30)  Potassium, Random Urine: 32.1 meq/L (06-11 @ 17:30)  Chloride, Random Urine: 4 mmol/L (06-11 @ 17:30)  Osmolality, Random Urine: 432 mosmo/kg (06-11 @ 17:30)      IMPRESSION:    (1)Renal - MAGNUS - prerenal +/- mild contrast nephropathy - resolving/resolved.    (2)Hypocalcemia - surgically-induced hypoparathyroidism; exacerbated by loop diuretics. Improving, with IV calcium repletion, increased calcitriol, and off loop diuretics    (3)Hyponatremia - hypovolemic - resolving off diuretics and s/p gentle volume repletion    (4)Abdominal pain - GI + Surgery on board    (5)CV - known systolic CHF - that being said, was hypovolemic on admission. Resolving/resolved.      RECOMMEND:  (1)Follow up GI + Surgery input  (2)BMP daily; IV calcium 1amp prn calcium 5.6-6.0; 2amp prn calcium <5.6  (3)Oscal/Calcitriol as ordered  (4)No IVF/No diuretics for now; eventual thiazide diuretics rather than loop diuretics? Favor eventual HCTZ 50mg po qd and no loop diuretics if sodium does not further worsen.  (5)1.2 liter free water restriction            Brian Henry MD  [facility]  [phone]

## 2017-06-12 NOTE — CHART NOTE - NSCHARTNOTEFT_GEN_A_CORE
HPI:  62 y/o F PMH DM2 (A1C 7.5 5/17), CHFrEF, HTN, thyroid CA s/p thyroidectomy c/b persistent hypocalcemia, Cholelithiasis p/w abdominal pain. Has been having persistent abdominal pain for past month with multiple ED visits, last yesterday, and one admission  in 5/17 for similar symptoms. She state pain is present in the upper abdomen R>L.     Patient complaining of increased SOB and chest pressure. called to patient bedside patient stating she has had increased SOB since the am. EKG reviewed RBBB w/ no acute changes from prior . respiratory exam showing mild crackles R>L LL. will check cxr. Considering hx of CHF w/ reduced EF and recent IVF. will check, probnp, and BMP  trop ckmb x 2 sets. vilma given lasix 40mg ivp x 1. Patient in no acute distress at time of exam .

## 2017-06-12 NOTE — CONSULT NOTE ADULT - ATTENDING COMMENTS
I suspect her abdominal pain and elevated lfts are from right heart failure  prior (extensive) gi workup is negative  discussed with patient and family at bedside I suspect her abdominal pain and elevated lfts are from right heart failure  prior (extensive) gi workup is negative  discussed with patient and family at bedside  I spoek to her cardiologist at [facility] he suspects gastroparesis, will order emptying study

## 2017-06-12 NOTE — PROGRESS NOTE ADULT - SUBJECTIVE AND OBJECTIVE BOX
Patient is a 61y old  Female who presents with a chief complaint of Abdominal pain (11 Jun 2017 12:31)      SUBJECTIVE / OVERNIGHT EVENTS: Pt still complaining of abdominal pain. Limited PO intake. Nausea but no vomiting. Denies chest pain, palpitations, SOB.     MEDICATIONS  (STANDING):  heparin  Injectable 5000Unit(s) SubCutaneous every 8 hours  lactated ringers. 1000milliLiter(s) IV Continuous <Continuous>  sucralfate 1Gram(s) Oral two times a day  aspirin enteric coated 81milliGRAM(s) Oral daily  carvedilol 3.125milliGRAM(s) Oral every 12 hours  levothyroxine 200MICROGram(s) Oral daily  ergocalciferol [id number](s) Oral every week  calcium carbonate 1250 mG (OsCal) 1Tablet(s) Oral four times a day  insulin lispro (HumaLOG) corrective regimen sliding scale  SubCutaneous three times a day before meals  dextrose 5%. 1000milliLiter(s) IV Continuous <Continuous>  dextrose 50% Injectable 12.5Gram(s) IV Push once  dextrose 50% Injectable 25Gram(s) IV Push once  dextrose 50% Injectable 25Gram(s) IV Push once  calcitriol   Capsule 0.5MICROGram(s) Oral two times a day  pantoprazole    Tablet 40milliGRAM(s) Oral before breakfast  ursodiol Capsule 300milliGRAM(s) Oral every 8 hours    MEDICATIONS  (PRN):  metoclopramide Injectable 10milliGRAM(s) IV Push every 6 hours PRN Nausea and/or Vomiting  aluminum hydroxide/magnesium hydroxide/simethicone Suspension 30milliLiter(s) Oral every 4 hours PRN Dyspepsia  dextrose Gel 1Dose(s) Oral once PRN Blood Glucose LESS THAN 70 milliGRAM(s)/deciliter  glucagon  Injectable 1milliGRAM(s) IntraMuscular once PRN Glucose LESS THAN 70 milligrams/deciliter      Vital Signs Last 24 Hrs  T(C): 36.4, Max: 37.2 (06-11 @ 12:13)  HR: 84 (77 - 87)  BP: 113/82 (104/67 - 130/90)  RR: 18 (18 - 20)  SpO2: 100% (98% - 100%)  Wt(kg): --  CAPILLARY BLOOD GLUCOSE  94 (12 Jun 2017 08:55)  173 (11 Jun 2017 21:10)  186 (11 Jun 2017 17:21)  158 (11 Jun 2017 13:22)    I&O's Summary      PHYSICAL EXAM:  GENERAL: NAD, obese   HEAD:  Atraumatic, Normocephalic  EYES: EOMI, PERRLA, conjunctiva and sclera clear  NECK: Supple, No JVD  CHEST/LUNG: Clear to auscultation bilaterally; No wheeze  HEART: Regular rate and rhythm; No murmurs, rubs, or gallops  ABDOMEN: Soft, Nontender, Nondistended; Bowel sounds present  EXTREMITIES:  2+ Peripheral Pulses, No clubbing, cyanosis, or edema  PSYCH: AAOx3  NEUROLOGY: non-focal  SKIN: No rashes or lesions    LABS:                        12.7   10.34 )-----------( 301      ( 12 Jun 2017 03:10 )             37.6     06-12    129<L>  |  85<L>  |  23  ----------------------------<  101<H>  4.3   |  22  |  1.39<H>    Ca    6.1<LL>      12 Jun 2017 03:00  Phos  5.2     06-11  Mg     1.6     06-11    TPro  7.0  /  Alb  4.0  /  TBili  2.4<H>  /  DBili  x   /  AST  197<H>  /  ALT  207<H>  /  AlkPhos  209<H>  06-12      CARDIAC MARKERS ( 12 Jun 2017 03:00 )  x     / < 0.06 ng/mL / 801 u/L / 5.04 ng/mL / x      CARDIAC MARKERS ( 11 Jun 2017 15:20 )  x     / < 0.06 ng/mL / 856 u/L / x     / x      CARDIAC MARKERS ( 11 Jun 2017 03:45 )  x     / < 0.06 ng/mL / 928 u/L / 5.66 ng/mL / x                Consultant(s) Notes Reviewed: Nephro, Surg    Care Discussed with Consultants/Other Providers: Cards

## 2017-06-12 NOTE — CONSULT NOTE ADULT - ATTENDING COMMENTS
Pt. seen and examined.  Agree with above.   -No clinical heart failure  -continue with coreg for NICM  -f/u EPS re: AICD  -f/u GI, surgery

## 2017-06-12 NOTE — CONSULT NOTE ADULT - ASSESSMENT
60yo F h/o DM2 (A1C 7.5 5/17), CHFrEF,  thyroid CA s/p thyroidectomy c/b persistent hypocalcemia, hypertension, hyperlipidemia, chronic RBBB,  obesity, thyroid CA, history of nonischemic cardiomyopathy with an ejection fraction of about 25%and normal coronaries on a cardiac catheterization performed in 2014.  The patient states she wore a LifeVest for three months, however, after that time it was determined that she did not require a defibrillator and Cholelithiasis p/w persistent abdominal pain with nausea and found to have transaminitis. On patients last admission in May 2017 she underwent EGD with esophagitis and duodenopathy and MRCP was also obtained and did not show any evidence of biliary obstruction at that time. Further imaging done on this admission with CT Abd showed 'Contracted gallbladder with intraluminal stones. Nonspecific   gallbladder wall thickening or edema. Appearance is unchanged from the prior recent CT and MRI examinations.' Surgery evaluated the patient and feels the RUQ abdominal pain with nausea and SOB are more likely related to the patient's CHF w/low EF that is causing hepatic congestion/transaminitis. The patient is also noted with increasing hyperbilirubinemia from previous admission. 60yo F h/o DM2 (A1C 7.5 5/17), CHFrEF,  thyroid CA s/p thyroidectomy c/b persistent hypocalcemia, hypertension, hyperlipidemia, chronic RBBB,  obesity, thyroid CA, history of nonischemic cardiomyopathy with an ejection fraction of about 25%and normal coronaries on a cardiac catheterization performed in 2014.  The patient states she wore a LifeVest for three months, however, after that time it was determined that she did not require a defibrillator and Cholelithiasis p/w persistent abdominal pain with nausea and found to have transaminitis. On patients last admission in May 2017 she underwent EGD with esophagitis and duodenopathy and MRCP was also obtained and did not show any evidence of biliary obstruction at that time. Further imaging done on this admission with CT Abd showed 'Contracted gallbladder with intraluminal stones. Nonspecific   gallbladder wall thickening or edema. Appearance is unchanged from the prior recent CT and MRI examinations.' Surgery evaluated the patient and feels the RUQ abdominal pain with nausea and SOB are more likely related to the patient's NICM/CHF w/low EF that is causing hepatic congestion/transaminitis. The patient is also noted with increasing hyperbilirubinemia from previous admission also likely a result of cardiac issues given recent MRCP and CT reveal no biliary obstruction

## 2017-06-13 DIAGNOSIS — I10 ESSENTIAL (PRIMARY) HYPERTENSION: ICD-10-CM

## 2017-06-13 DIAGNOSIS — K31.84 GASTROPARESIS: ICD-10-CM

## 2017-06-13 DIAGNOSIS — K83.8 OTHER SPECIFIED DISEASES OF BILIARY TRACT: ICD-10-CM

## 2017-06-13 LAB
ALBUMIN SERPL ELPH-MCNC: 3.8 G/DL — SIGNIFICANT CHANGE UP (ref 3.3–5)
ALP SERPL-CCNC: 218 U/L — HIGH (ref 40–120)
ALT FLD-CCNC: 414 U/L — HIGH (ref 4–33)
AST SERPL-CCNC: 373 U/L — HIGH (ref 4–32)
BILIRUB DIRECT SERPL-MCNC: 1.8 MG/DL — HIGH (ref 0.1–0.2)
BILIRUB SERPL-MCNC: 3.2 MG/DL — HIGH (ref 0.2–1.2)
BUN SERPL-MCNC: 20 MG/DL — SIGNIFICANT CHANGE UP (ref 7–23)
CALCIUM SERPL-MCNC: 6.2 MG/DL — CRITICAL LOW (ref 8.4–10.5)
CHLORIDE SERPL-SCNC: 83 MMOL/L — LOW (ref 98–107)
CK MB BLD-MCNC: 0.7 — SIGNIFICANT CHANGE UP (ref 0–2.5)
CK MB BLD-MCNC: 5.27 NG/ML — HIGH (ref 1–4.7)
CK SERPL-CCNC: 755 U/L — HIGH (ref 25–170)
CO2 SERPL-SCNC: 23 MMOL/L — SIGNIFICANT CHANGE UP (ref 22–31)
CREAT SERPL-MCNC: 1.26 MG/DL — SIGNIFICANT CHANGE UP (ref 0.5–1.3)
GLUCOSE SERPL-MCNC: 151 MG/DL — HIGH (ref 70–99)
HCT VFR BLD CALC: 37.1 % — SIGNIFICANT CHANGE UP (ref 34.5–45)
HGB BLD-MCNC: 12.5 G/DL — SIGNIFICANT CHANGE UP (ref 11.5–15.5)
MAGNESIUM SERPL-MCNC: 1.5 MG/DL — LOW (ref 1.6–2.6)
MCHC RBC-ENTMCNC: 25.6 PG — LOW (ref 27–34)
MCHC RBC-ENTMCNC: 33.7 % — SIGNIFICANT CHANGE UP (ref 32–36)
MCV RBC AUTO: 76 FL — LOW (ref 80–100)
PHOSPHATE SERPL-MCNC: 4.5 MG/DL — SIGNIFICANT CHANGE UP (ref 2.5–4.5)
PLATELET # BLD AUTO: 313 K/UL — SIGNIFICANT CHANGE UP (ref 150–400)
PMV BLD: 11.4 FL — SIGNIFICANT CHANGE UP (ref 7–13)
POTASSIUM SERPL-MCNC: 3.9 MMOL/L — SIGNIFICANT CHANGE UP (ref 3.5–5.3)
POTASSIUM SERPL-SCNC: 3.9 MMOL/L — SIGNIFICANT CHANGE UP (ref 3.5–5.3)
PROT SERPL-MCNC: 6.8 G/DL — SIGNIFICANT CHANGE UP (ref 6–8.3)
RBC # BLD: 4.88 M/UL — SIGNIFICANT CHANGE UP (ref 3.8–5.2)
RBC # FLD: 16.2 % — HIGH (ref 10.3–14.5)
SODIUM SERPL-SCNC: 127 MMOL/L — LOW (ref 135–145)
TROPONIN T SERPL-MCNC: < 0.06 NG/ML — SIGNIFICANT CHANGE UP (ref 0–0.06)
WBC # BLD: 9.35 K/UL — SIGNIFICANT CHANGE UP (ref 3.8–10.5)
WBC # FLD AUTO: 9.35 K/UL — SIGNIFICANT CHANGE UP (ref 3.8–10.5)

## 2017-06-13 PROCEDURE — 74181 MRI ABDOMEN W/O CONTRAST: CPT | Mod: 26

## 2017-06-13 RX ORDER — FUROSEMIDE 40 MG
40 TABLET ORAL
Qty: 0 | Refills: 0 | Status: DISCONTINUED | OUTPATIENT
Start: 2017-06-13 | End: 2017-06-13

## 2017-06-13 RX ORDER — METOCLOPRAMIDE HCL 10 MG
1 TABLET ORAL
Qty: 120 | Refills: 0 | OUTPATIENT
Start: 2017-06-13 | End: 2017-07-13

## 2017-06-13 RX ORDER — CALCIUM GLUCONATE 100 MG/ML
1 VIAL (ML) INTRAVENOUS ONCE
Qty: 0 | Refills: 0 | Status: COMPLETED | OUTPATIENT
Start: 2017-06-13 | End: 2017-06-13

## 2017-06-13 RX ORDER — MAGNESIUM SULFATE 500 MG/ML
2 VIAL (ML) INJECTION ONCE
Qty: 0 | Refills: 0 | Status: COMPLETED | OUTPATIENT
Start: 2017-06-13 | End: 2017-06-13

## 2017-06-13 RX ORDER — URSODIOL 250 MG/1
1 TABLET, FILM COATED ORAL
Qty: 90 | Refills: 0 | OUTPATIENT
Start: 2017-06-13 | End: 2017-07-13

## 2017-06-13 RX ORDER — FUROSEMIDE 40 MG
20 TABLET ORAL
Qty: 0 | Refills: 0 | Status: DISCONTINUED | OUTPATIENT
Start: 2017-06-13 | End: 2017-06-14

## 2017-06-13 RX ORDER — FUROSEMIDE 40 MG
20 TABLET ORAL
Qty: 0 | Refills: 0 | Status: DISCONTINUED | OUTPATIENT
Start: 2017-06-13 | End: 2017-06-13

## 2017-06-13 RX ORDER — PANTOPRAZOLE SODIUM 20 MG/1
1 TABLET, DELAYED RELEASE ORAL
Qty: 30 | Refills: 0 | OUTPATIENT
Start: 2017-06-13 | End: 2017-07-13

## 2017-06-13 RX ADMIN — Medication 1 TABLET(S): at 05:35

## 2017-06-13 RX ADMIN — Medication 1: at 09:12

## 2017-06-13 RX ADMIN — CARVEDILOL PHOSPHATE 3.12 MILLIGRAM(S): 80 CAPSULE, EXTENDED RELEASE ORAL at 18:02

## 2017-06-13 RX ADMIN — Medication 50 GRAM(S): at 18:02

## 2017-06-13 RX ADMIN — URSODIOL 300 MILLIGRAM(S): 250 TABLET, FILM COATED ORAL at 21:15

## 2017-06-13 RX ADMIN — Medication 10 MILLIGRAM(S): at 07:08

## 2017-06-13 RX ADMIN — Medication 50 GRAM(S): at 09:15

## 2017-06-13 RX ADMIN — CARVEDILOL PHOSPHATE 3.12 MILLIGRAM(S): 80 CAPSULE, EXTENDED RELEASE ORAL at 05:32

## 2017-06-13 RX ADMIN — URSODIOL 300 MILLIGRAM(S): 250 TABLET, FILM COATED ORAL at 15:19

## 2017-06-13 RX ADMIN — CALCITRIOL 0.5 MICROGRAM(S): 0.5 CAPSULE ORAL at 05:32

## 2017-06-13 RX ADMIN — Medication 1 TABLET(S): at 12:26

## 2017-06-13 RX ADMIN — URSODIOL 300 MILLIGRAM(S): 250 TABLET, FILM COATED ORAL at 05:35

## 2017-06-13 RX ADMIN — Medication 81 MILLIGRAM(S): at 12:26

## 2017-06-13 RX ADMIN — Medication 30 MILLILITER(S): at 16:32

## 2017-06-13 RX ADMIN — Medication 200 MICROGRAM(S): at 05:32

## 2017-06-13 RX ADMIN — Medication 20 MILLIGRAM(S): at 14:52

## 2017-06-13 RX ADMIN — Medication 20 MILLIGRAM(S): at 21:22

## 2017-06-13 RX ADMIN — CALCITRIOL 0.5 MICROGRAM(S): 0.5 CAPSULE ORAL at 18:01

## 2017-06-13 RX ADMIN — Medication 1 GRAM(S): at 05:32

## 2017-06-13 RX ADMIN — Medication 200 GRAM(S): at 11:41

## 2017-06-13 RX ADMIN — PANTOPRAZOLE SODIUM 40 MILLIGRAM(S): 20 TABLET, DELAYED RELEASE ORAL at 07:07

## 2017-06-13 RX ADMIN — HEPARIN SODIUM 5000 UNIT(S): 5000 INJECTION INTRAVENOUS; SUBCUTANEOUS at 21:15

## 2017-06-13 RX ADMIN — Medication 1: at 17:59

## 2017-06-13 RX ADMIN — Medication 1 GRAM(S): at 18:02

## 2017-06-13 RX ADMIN — Medication 3: at 12:27

## 2017-06-13 RX ADMIN — Medication 1 TABLET(S): at 00:26

## 2017-06-13 RX ADMIN — HEPARIN SODIUM 5000 UNIT(S): 5000 INJECTION INTRAVENOUS; SUBCUTANEOUS at 05:32

## 2017-06-13 RX ADMIN — Medication 1 TABLET(S): at 18:01

## 2017-06-13 NOTE — DISCHARGE NOTE ADULT - HOSPITAL COURSE
62 y/o F PMH DM2 (A1C 7.5 5/17), CHFrEF, HTN, thyroid CA s/p thyroidectomy c/b persistent hypocalcemia, Cholelithiasis p/w abdominal pain. Has been having persistent abdominal pain for past month with multiple ED visits, last yesterday, and one admission  in 5/17 for similar symptoms. She state pain is present in the upper abdomen R>L. She notes that it is a constant pain, dull throughout most of the day but worsens when she tries to eat but is often unable to keep any food down. She has been having nausea and vomiting throughout the duration of the abdominal pain but has occurred more frequently lately. On ROS she notes she intermittently has SOB while walking. Denies fever, chest pain, headache, constipation or diarrhea.   In ED VS T 98.8 HR 88 /77. Labs significant for Cr of 1.59, ca 5.6, Tbili 1.7 w/ elevated alk phos and transaminitis. Given 500 cc NS bolus and several doses of Reglan for n/v and 1x morphine  Patient was admitted to medicine for further evaluation. Surgery was reconsulted to evaluate for any surgical intervention and did not believe the patient's presentation warranted any surgical intervention at this time. Patient's diet was advanced and her nausea regimen was optimized with reglan and carafate. Nephrology was consulted for her metabolic abnormalities and recommended aggressive repletion of calcium and light hydration for MAGNUS. During her hospital course her MAGNUS resolved and her calcium stabilized to >6.0, her baseline. The admit was c/b worsening transaminitis and hyperbilirubinemia so GI was consulted. GI believed the uptrending LFTs were the result of worsening Right sided CHF causing hepatic congestion. Cardiology was consulted and believed 62 y/o F PMH DM2 (A1C 7.5 5/17), CHFrEF, HTN, thyroid CA s/p thyroidectomy c/b persistent hypocalcemia, Cholelithiasis p/w abdominal pain. Has been having persistent abdominal pain for past month with multiple ED visits, last yesterday, and one admission  in 5/17 for similar symptoms. She state pain is present in the upper abdomen R>L. She notes that it is a constant pain, dull throughout most of the day but worsens when she tries to eat but is often unable to keep any food down. She has been having nausea and vomiting throughout the duration of the abdominal pain but has occurred more frequently lately. On ROS she notes she intermittently has SOB while walking. Denies fever, chest pain, headache, constipation or diarrhea.   In ED VS T 98.8 HR 88 /77. Labs significant for Cr of 1.59, ca 5.6, Tbili 1.7 w/ elevated alk phos and transaminitis. Given 500 cc NS bolus and several doses of Reglan for n/v and 1x morphine  Patient was admitted to medicine for further evaluation. Surgery was reconsulted to evaluate for any surgical intervention and did not believe the patient's presentation warranted any surgical intervention at this time. Patient's diet was advanced and her nausea regimen was optimized with reglan and carafate. Nephrology was consulted for her metabolic abnormalities and recommended aggressive repletion of calcium and light hydration for MAGNUS. During her hospital course her MAGNUS resolved and her calcium stabilized to >6.0, her baseline. The admit was c/b worsening transaminitis and hyperbilirubinemia so GI was consulted. GI believed the uptrending LFTs were the result of worsening Right sided CHF causing hepatic congestion. Cardiology was consulted and believed it was still hepatic in etiology so another MRCP was performed which showed no abnormalities. The Tbili still continued to rise prompting team to offer patient right heart catheterization for further evaluation. She was explained procedure and she decided to not get the procedure and was adamant in wanting to leave the hospital. Dr. Vidal and myself explained to patient risks of leaving the hospital with her rising transaminitis, acute kidney injury and hyponatremia including worsening deterioration and death. She listened to the risks and understood them but still wanted to leave the hospital stating that she will get second opinion at [facility]. She will be discharged against medical advice.

## 2017-06-13 NOTE — DISCHARGE NOTE ADULT - CARE PROVIDER_API CALL
Manny Torres (DO), Internal Medicine  [address] [place] [address]  Phone: [phone]  Fax: [phone]

## 2017-06-13 NOTE — DISCHARGE NOTE ADULT - CARE PLAN
Principal Discharge DX:	Abdominal pain  Goal:	follow up  Instructions for follow-up, activity and diet:	Please take reglan as prescribed  Secondary Diagnosis:	MAGNUS (acute kidney injury)  Instructions for follow-up, activity and diet:	Your kidney function is rising. Please follow up to get creatinine checked to confirm status  Secondary Diagnosis:	CHF (congestive heart failure)  Instructions for follow-up, activity and diet:	You have been explained risks of denying AICD placement. Please follow up with cardiology for further questions  Secondary Diagnosis:	Transaminitis  Instructions for follow-up, activity and diet:	Your liver function tests have worsened but you have decided to leave when it was recommended you undergo further tests but you have decided to leave against medical advice  Secondary Diagnosis:	Hyponatremia  Instructions for follow-up, activity and diet:	Your sodium levels have gotten worse but you have decided to leave Principal Discharge DX:	Abdominal pain  Goal:	follow up  Instructions for follow-up, activity and diet:	Please take reglan as prescribed  Secondary Diagnosis:	MAGNUS (acute kidney injury)  Instructions for follow-up, activity and diet:	Your kidney function is rising. Please follow up to get creatinine checked to confirm status  Secondary Diagnosis:	CHF (congestive heart failure)  Instructions for follow-up, activity and diet:	You have been explained risks of denying AICD placement. Please follow up with cardiology for further questions  Secondary Diagnosis:	Transaminitis  Instructions for follow-up, activity and diet:	Your liver function tests have worsened but you have decided to leave when it was recommended you undergo further tests but you have decided to leave against medical advice  Secondary Diagnosis:	Hyponatremia  Instructions for follow-up, activity and diet:	Your sodium levels have gotten worse but you have decided to leave. Please limit fluid intake.  if you experience worsening weakness, nausea please seek emergent medical attention

## 2017-06-13 NOTE — PROVIDER CONTACT NOTE (MEDICATION) - SITUATION
Pt refusing to get medications although pt given educational materials and verbalized understanding.

## 2017-06-13 NOTE — PROGRESS NOTE ADULT - PROBLEM SELECTOR PLAN 3
surgically-induced hypoparathyroidism.  Stable.   - IV calcium 1amp prn calcium 5.6-6.0; 2amp prn calcium <5.6

## 2017-06-13 NOTE — PROGRESS NOTE ADULT - SUBJECTIVE AND OBJECTIVE BOX
No chest pain or sob.     metoclopramide Injectable 10milliGRAM(s) IV Push every 6 hours PRN  aluminum hydroxide/magnesium hydroxide/simethicone Suspension 30milliLiter(s) Oral every 4 hours PRN  heparin  Injectable 5000Unit(s) SubCutaneous every 8 hours  sucralfate 1Gram(s) Oral two times a day  aspirin enteric coated 81milliGRAM(s) Oral daily  carvedilol 3.125milliGRAM(s) Oral every 12 hours  levothyroxine 200MICROGram(s) Oral daily  ergocalciferol 55731Doqc(s) Oral every week  calcium carbonate 1250 mG (OsCal) 1Tablet(s) Oral four times a day  insulin lispro (HumaLOG) corrective regimen sliding scale  SubCutaneous three times a day before meals  dextrose 5%. 1000milliLiter(s) IV Continuous <Continuous>  dextrose Gel 1Dose(s) Oral once PRN  dextrose 50% Injectable 12.5Gram(s) IV Push once  dextrose 50% Injectable 25Gram(s) IV Push once  dextrose 50% Injectable 25Gram(s) IV Push once  glucagon  Injectable 1milliGRAM(s) IntraMuscular once PRN  calcitriol   Capsule 0.5MICROGram(s) Oral two times a day  pantoprazole    Tablet 40milliGRAM(s) Oral before breakfast  ursodiol Capsule 300milliGRAM(s) Oral every 8 hours  dicyclomine 10milliGRAM(s) Oral three times a day before meals  furosemide   Injectable 20milliGRAM(s) IV Push two times a day                            12.5   9.35  )-----------( 313      ( 13 Jun 2017 07:30 )             37.1       06-13    127<L>  |  83<L>  |  20  ----------------------------<  151<H>  3.9   |  23  |  1.26    Ca    6.2<LL>      13 Jun 2017 07:30  Phos  4.5     06-13  Mg     1.5     06-13    TPro  6.8  /  Alb  3.8  /  TBili  3.2<H>  /  DBili  1.8<H>  /  AST  373<H>  /  ALT  414<H>  /  AlkPhos  218<H>  06-13      CARDIAC MARKERS ( 12 Jun 2017 23:45 )  x     / < 0.06 ng/mL / 755 u/L / 5.27 ng/mL / x      CARDIAC MARKERS ( 12 Jun 2017 19:30 )  x     / < 0.06 ng/mL / 806 u/L / 5.25 ng/mL / x      CARDIAC MARKERS ( 12 Jun 2017 03:00 )  x     / < 0.06 ng/mL / 801 u/L / 5.04 ng/mL / x      CARDIAC MARKERS ( 11 Jun 2017 15:20 )  x     / < 0.06 ng/mL / 856 u/L / x     / x      CARDIAC MARKERS ( 11 Jun 2017 03:45 )  x     / < 0.06 ng/mL / 928 u/L / 5.66 ng/mL / x            T(C): 36.8, Max: 36.8 (06-13 @ 05:27)  HR: 78 (78 - 88)  BP: 114/72 (104/76 - 124/91)  RR: 19 (16 - 19)  SpO2: 95% (95% - 100%)  Wt(kg): --    I&O's Summary    I & Os for current day (as of 13 Jun 2017 14:28)  =============================================  IN: 0 ml / OUT: 200 ml / NET: -200 ml          T(C): 36.8, Max: 36.8 (06-13 @ 05:27)  HR: 78 (78 - 88)  BP: 114/72 (104/76 - 124/91)  RR: 19 (16 - 19)  SpO2: 95% (95% - 100%)  Wt(kg): --    Heart: nl s1, s2, rrr, no m/r/g  -Lungs: cta b/l  No jvd  Ext no pitting edema       A/P) Ms. Carson is a pleasant 61-year-old female with past medical history of hypertension, hyperlipidemia, obesity, history of nonischemic cardiomyopathy admitted with abdominal pain.     - Pt. does not appear to have clinical signs of heart failure.  I do not suspect her LFT's are secondary to heart failure due to the fact the patient has no other signs of chf such as jvd or volume overload.  Could more definitively assess her hemodynamics and right sided pressures with a right heart catheterization however the patient is refusing.  The patient has been explained all risks and benefits of right heart cath and further CV workup.  She understands all risks of refusal including but not limited to death and continues to refuse.      -EPS follow up appreciated - pt. refusing AICD    -Continue with medical management of NICM    -F/u GI

## 2017-06-13 NOTE — PROGRESS NOTE ADULT - SUBJECTIVE AND OBJECTIVE BOX
INTERVAL HPI/OVERNIGHT EVENTS: Feel fine and eating so want to go home.   Vital Signs Last 24 Hrs  T(C): 36.8, Max: 36.8 (06-13 @ 05:27)  T(F): 98.3, Max: 98.3 (06-13 @ 05:27)  HR: 78 (78 - 88)  BP: 114/72 (104/76 - 124/91)  BP(mean): --  RR: 19 (16 - 19)  SpO2: 95% (95% - 100%)  I&O's Summary    I & Os for current day (as of 13 Jun 2017 10:57)  =============================================  IN: 0 ml / OUT: 200 ml / NET: -200 ml    MEDICATIONS  (STANDING):  heparin  Injectable 5000Unit(s) SubCutaneous every 8 hours  sucralfate 1Gram(s) Oral two times a day  aspirin enteric coated 81milliGRAM(s) Oral daily  carvedilol 3.125milliGRAM(s) Oral every 12 hours  levothyroxine 200MICROGram(s) Oral daily  ergocalciferol [id number](s) Oral every week  calcium carbonate 1250 mG (OsCal) 1Tablet(s) Oral four times a day  insulin lispro (HumaLOG) corrective regimen sliding scale  SubCutaneous three times a day before meals  dextrose 5%. 1000milliLiter(s) IV Continuous <Continuous>  dextrose 50% Injectable 12.5Gram(s) IV Push once  dextrose 50% Injectable 25Gram(s) IV Push once  dextrose 50% Injectable 25Gram(s) IV Push once  calcitriol   Capsule 0.5MICROGram(s) Oral two times a day  pantoprazole    Tablet 40milliGRAM(s) Oral before breakfast  ursodiol Capsule 300milliGRAM(s) Oral every 8 hours  dicyclomine 10milliGRAM(s) Oral three times a day before meals  calcium gluconate IVPB 1Gram(s) IV Intermittent once  furosemide   Injectable 20milliGRAM(s) IV Push two times a day    MEDICATIONS  (PRN):  metoclopramide Injectable 10milliGRAM(s) IV Push every 6 hours PRN Nausea and/or Vomiting  aluminum hydroxide/magnesium hydroxide/simethicone Suspension 30milliLiter(s) Oral every 4 hours PRN Dyspepsia  dextrose Gel 1Dose(s) Oral once PRN Blood Glucose LESS THAN 70 milliGRAM(s)/deciliter  glucagon  Injectable 1milliGRAM(s) IntraMuscular once PRN Glucose LESS THAN 70 milligrams/deciliter    LABS:                        12.5   9.35  )-----------( 313      ( 13 Jun 2017 07:30 )             37.1     06-13    127<L>  |  83<L>  |  20  ----------------------------<  151<H>  3.9   |  23  |  1.26    Ca    6.2<LL>      13 Jun 2017 07:30  Phos  4.5     06-13  Mg     1.5     06-13    TPro  6.8  /  Alb  3.8  /  TBili  3.2<H>  /  DBili  1.8<H>  /  AST  373<H>  /  ALT  414<H>  /  AlkPhos  218<H>  06-13        CAPILLARY BLOOD GLUCOSE  171 (13 Jun 2017 08:40)  223 (12 Jun 2017 22:05)  225 (12 Jun 2017 17:27)  144 (12 Jun 2017 11:59)          REVIEW OF SYSTEMS:  CONSTITUTIONAL: No fever, weight loss, or fatigue  EYES: No eye pain, visual disturbances, or discharge  ENMT:  No difficulty hearing, tinnitus, vertigo; No sinus or throat pain  NECK: No pain or stiffness  BREASTS: No pain, masses, or nipple discharge  RESPIRATORY: No cough, wheezing, chills or hemoptysis; No shortness of breath  CARDIOVASCULAR: No chest pain, palpitations, dizziness, or leg swelling  GASTROINTESTINAL: No abdominal or epigastric pain. No nausea, vomiting, or hematemesis; No diarrhea or constipation. No melena or hematochezia.  GENITOURINARY: No dysuria, frequency, hematuria, or incontinence  NEUROLOGICAL: No headaches, memory loss, loss of strength, numbness, or tremors  SKIN: No itching, burning, rashes, or lesions   LYMPH NODES: No enlarged glands  ENDOCRINE: No heat or cold intolerance; No hair loss  MUSCULOSKELETAL: No joint pain or swelling; No muscle, back, or extremity pain  PSYCHIATRIC: No depression, anxiety, mood swings, or difficulty sleeping  HEME/LYMPH: No easy bruising, or bleeding gums  ALLERY AND IMMUNOLOGIC: No hives or eczema    RADIOLOGY & ADDITIONAL TESTS:    Imaging Personally Reviewed:  [ ] YES  [ ] NO    Consultant(s) Notes Reviewed:  [x ] YES  [ ] NO    PHYSICAL EXAM:  GENERAL: NAD, well-groomed, well-developed  HEAD:  Atraumatic, Normocephalic  EYES: EOMI, PERRLA, conjunctiva and sclera clear  ENMT: No tonsillar erythema, exudates, or enlargement; Moist mucous membranes, Good dentition, No lesions  NECK: Supple, No JVD, Normal thyroid  NERVOUS SYSTEM:  Alert & Oriented X3, Good concentration; Motor Strength 5/5 B/L upper and lower extremities; DTRs 2+ intact and symmetric  CHEST/LUNG: Clear to percussion bilaterally; No rales, rhonchi, wheezing, or rubs  HEART: Regular rate and rhythm; No murmurs, rubs, or gallops  ABDOMEN: Soft, Nontender, Nondistended; Bowel sounds present  EXTREMITIES:  2+ Peripheral Pulses, No clubbing, cyanosis, or edema  LYMPH: No lymphadenopathy noted  SKIN: No rashes or lesions    Care Discussed with Consultants/Other Providers [ x] YES  [ ] NO

## 2017-06-13 NOTE — PROGRESS NOTE ADULT - SUBJECTIVE AND OBJECTIVE BOX
INTERVAL HPI/OVERNIGHT EVENTS:    c/o nausea small vomit and abd pain, however, nausea improving  decreased po intake    MEDICATIONS  (STANDING):  heparin  Injectable 5000Unit(s) SubCutaneous every 8 hours  sucralfate 1Gram(s) Oral two times a day  aspirin enteric coated 81milliGRAM(s) Oral daily  carvedilol 3.125milliGRAM(s) Oral every 12 hours  levothyroxine 200MICROGram(s) Oral daily  ergocalciferol 08266Fyru(s) Oral every week  calcium carbonate 1250 mG (OsCal) 1Tablet(s) Oral four times a day  insulin lispro (HumaLOG) corrective regimen sliding scale  SubCutaneous three times a day before meals  dextrose 5%. 1000milliLiter(s) IV Continuous <Continuous>  dextrose 50% Injectable 12.5Gram(s) IV Push once  dextrose 50% Injectable 25Gram(s) IV Push once  dextrose 50% Injectable 25Gram(s) IV Push once  calcitriol   Capsule 0.5MICROGram(s) Oral two times a day  pantoprazole    Tablet 40milliGRAM(s) Oral before breakfast  ursodiol Capsule 300milliGRAM(s) Oral every 8 hours  dicyclomine 10milliGRAM(s) Oral three times a day before meals  calcium gluconate IVPB 1Gram(s) IV Intermittent once    MEDICATIONS  (PRN):  metoclopramide Injectable 10milliGRAM(s) IV Push every 6 hours PRN Nausea and/or Vomiting  aluminum hydroxide/magnesium hydroxide/simethicone Suspension 30milliLiter(s) Oral every 4 hours PRN Dyspepsia  dextrose Gel 1Dose(s) Oral once PRN Blood Glucose LESS THAN 70 milliGRAM(s)/deciliter  glucagon  Injectable 1milliGRAM(s) IntraMuscular once PRN Glucose LESS THAN 70 milligrams/deciliter      Allergies    penicillin (Rash)    Intolerances        Review of Systems:    General:  No wt loss, fevers, chills, night sweats,fatigue,   Eyes:  Good vision, no reported pain  ENT:  No sore throat, pain, runny nose, dysphagia  CV:  No pain, palpitatioins, hypo/hypertension  Resp:  No dyspnea, cough, tachypnea, wheezing  GI:  No pain, No nausea, No vomiting, No diarrhea, No constipatiion, No weight loss, No fever, No pruritis, No rectal bleeding, No tarry stools, No dysphagia,  :  No pain, bleeding, incontinence, nocturia  Muscle:  No pain, weakness  Neuro:  No weakness, tingling, memory problems  Psych:  No fatigue, insomnia, mood problems, depression  Endocrine:  No polyuria, polydypsia, cold/heat intolerance  Heme:  No petechiae, ecchymosis, easy bruisability  Skin:  No rash, tattoos, scars, edema      Vital Signs Last 24 Hrs  T(C): 36.8, Max: 36.8 (06-13 @ 05:27)  T(F): 98.3, Max: 98.3 (06-13 @ 05:27)  HR: 78 (78 - 88)  BP: 114/72 (104/76 - 124/91)  BP(mean): --  RR: 19 (16 - 19)  SpO2: 95% (95% - 100%)    PHYSICAL EXAM:    Constitutional: NAD, well-developed  HEENT: EOMI, throat clear  Neck: No LAD, supple  Respiratory: CTA and P  Cardiovascular: S1 and S2, RRR, no M  Gastrointestinal: BS+, soft, NT/ND, neg HSM,  Extremities: No peripheral edema, neg clubing, cyanosis  Vascular: 2+ peripheral pulses  Neurological: A/O x 3, no focal deficits  Psychiatric: Normal mood, normal affect  Skin: No rashes      LABS:                        12.5   9.35  )-----------( 313      ( 13 Jun 2017 07:30 )             37.1     06-13    127<L>  |  83<L>  |  20  ----------------------------<  151<H>  3.9   |  23  |  1.26    Ca    6.2<LL>      13 Jun 2017 07:30  Phos  4.5     06-13  Mg     1.5     06-13    TPro  6.8  /  Alb  3.8  /  TBili  3.2<H>  /  DBili  1.8<H>  /  AST  373<H>  /  ALT  414<H>  /  AlkPhos  218<H>  06-13          RADIOLOGY & ADDITIONAL TESTS:

## 2017-06-13 NOTE — DISCHARGE NOTE ADULT - PATIENT PORTAL LINK FT
“You can access the [facility] Patient Portal, offered by [facility], by registering with the following website: [email or web address]”

## 2017-06-13 NOTE — PROGRESS NOTE ADULT - SUBJECTIVE AND OBJECTIVE BOX
EP ATTENDING    No palpitations, no syncope, no angina    metoclopramide Injectable 10milliGRAM(s) IV Push every 6 hours PRN  aluminum hydroxide/magnesium hydroxide/simethicone Suspension 30milliLiter(s) Oral every 4 hours PRN  heparin  Injectable 5000Unit(s) SubCutaneous every 8 hours  sucralfate 1Gram(s) Oral two times a day  aspirin enteric coated 81milliGRAM(s) Oral daily  carvedilol 3.125milliGRAM(s) Oral every 12 hours  levothyroxine 200MICROGram(s) Oral daily  ergocalciferol 95534Owgt(s) Oral every week  calcium carbonate 1250 mG (OsCal) 1Tablet(s) Oral four times a day  insulin lispro (HumaLOG) corrective regimen sliding scale  SubCutaneous three times a day before meals  dextrose 5%. 1000milliLiter(s) IV Continuous <Continuous>  dextrose Gel 1Dose(s) Oral once PRN  dextrose 50% Injectable 12.5Gram(s) IV Push once  dextrose 50% Injectable 25Gram(s) IV Push once  dextrose 50% Injectable 25Gram(s) IV Push once  glucagon  Injectable 1milliGRAM(s) IntraMuscular once PRN  calcitriol   Capsule 0.5MICROGram(s) Oral two times a day  pantoprazole    Tablet 40milliGRAM(s) Oral before breakfast  ursodiol Capsule 300milliGRAM(s) Oral every 8 hours  dicyclomine 10milliGRAM(s) Oral three times a day before meals  furosemide   Injectable 20milliGRAM(s) IV Push two times a day                            12.5   9.35  )-----------( 313      ( 13 Jun 2017 07:30 )             37.1       06-13    127<L>  |  83<L>  |  20  ----------------------------<  151<H>  3.9   |  23  |  1.26    Ca    6.2<LL>      13 Jun 2017 07:30  Phos  4.5     06-13  Mg     1.5     06-13    TPro  6.8  /  Alb  3.8  /  TBili  3.2<H>  /  DBili  1.8<H>  /  AST  373<H>  /  ALT  414<H>  /  AlkPhos  218<H>  06-13      CARDIAC MARKERS ( 12 Jun 2017 23:45 )  x     / < 0.06 ng/mL / 755 u/L / 5.27 ng/mL / x      CARDIAC MARKERS ( 12 Jun 2017 19:30 )  x     / < 0.06 ng/mL / 806 u/L / 5.25 ng/mL / x      CARDIAC MARKERS ( 12 Jun 2017 03:00 )  x     / < 0.06 ng/mL / 801 u/L / 5.04 ng/mL / x      CARDIAC MARKERS ( 11 Jun 2017 15:20 )  x     / < 0.06 ng/mL / 856 u/L / x     / x      CARDIAC MARKERS ( 11 Jun 2017 03:45 )  x     / < 0.06 ng/mL / 928 u/L / 5.66 ng/mL / x            T(C): 36.8, Max: 36.8 (06-13 @ 05:27)  HR: 78 (78 - 88)  BP: 114/72 (104/76 - 124/91)  RR: 19 (16 - 19)  SpO2: 95% (95% - 100%)  Wt(kg): --  no JVD  RRR, no murmurs  Soft nt/nd  no c/c/e  CTAB      A/P) MsAlex Carson is a pleasant 61-year-old female with past medical history of hypertension, hyperlipidemia, obesity, history of nonischemic cardiomyopathy with an ejection fraction of about 25% and normal coronaries on a cardiac catheterization performed in 2014.  Despite greater than 9 months of optimal medical therapy she continues to have NYHA Class III CHF and a persistently reduced LVEF. In addition her EKG shows bifascicular block with QRS > 150 ms. She denies syncope or angina    -given the above I recommended a BiV-ICD. I cited a 3% risk of infection or bleeding and a 1% risk of major complication including but not limited to heart attack, stroke, death or need for emergency open heart surgery or pericardiocentesis. Understanding her R/B/A she is refusing BiV-ICD implantation at this time. She fully understands the risks of refusal, including preventible sudden cardiac death or life disabling syncope with injury. I provided my contact information as well as informative literature, and instructed her to contact me if she and her  wish to pursue BiV-ICD implantation.      Dariana Hallman M.D., [facility]  [phone]

## 2017-06-13 NOTE — DISCHARGE NOTE ADULT - PLAN OF CARE
follow up Please take reglan as prescribed Your kidney function is rising. Please follow up to get creatinine checked to confirm status You have been explained risks of denying AICD placement. Please follow up with cardiology for further questions Your liver function tests have worsened but you have decided to leave when it was recommended you undergo further tests but you have decided to leave against medical advice Your sodium levels have gotten worse but you have decided to leave Your sodium levels have gotten worse but you have decided to leave. Please limit fluid intake.  if you experience worsening weakness, nausea please seek emergent medical attention

## 2017-06-13 NOTE — PROGRESS NOTE ADULT - SUBJECTIVE AND OBJECTIVE BOX
Events noted yesterday for SOB. EKG with no changes and lasix 40 mg IV given with good effect.  Resting comfortably in bed with no complaints.                             12.5   9.35  )-----------( 313      ( 2017 07:30 )             37.1                           12.5   9.35  )-----------( 313      ( 2017 07:30 )             37.1     06    127<L>  |  83<L>  |  20  ----------------------------<  151<H>  3.9   |  23  |  1.26    Ca    6.2<LL>      2017 07:30  Phos  4.5       Mg     1.5         TPro  6.8  /  Alb  3.8  /  TBili  3.2<H>  /  DBili  1.8<H>  /  AST  373<H>  /  ALT  414<H>  /  AlkPhos  218<H>        I&O's Detail    I & Os for current day (as of 2017 12:08)  =============================================  IN:    Total IN: 0 ml  ---------------------------------------------  OUT:    Voided: 200 ml    Total OUT: 200 ml  ---------------------------------------------  Total NET: -200 ml      I&O's Summary    I & Os for current day (as of 2017 12:08)  =============================================  IN: 0 ml / OUT: 200 ml / NET: -200 ml      Daily     Daily Weight in k.3 (2017 08:38)    Vital Signs Last 24 Hrs  T(C): 36.8, Max: 36.8 ( @ 05:27)  T(F): 98.3, Max: 98.3 ( @ 05:27)  HR: 78 (78 - 88)  BP: 114/72 (104/76 - 124/91)  BP(mean): --  RR: 19 (16 - 19)  SpO2: 95% (95% - 100%)      MEDICATIONS  (STANDING):  heparin  Injectable 5000Unit(s) SubCutaneous every 8 hours  sucralfate 1Gram(s) Oral two times a day  aspirin enteric coated 81milliGRAM(s) Oral daily  carvedilol 3.125milliGRAM(s) Oral every 12 hours  levothyroxine 200MICROGram(s) Oral daily  ergocalciferol 38278Mxkz(s) Oral every week  calcium carbonate 1250 mG (OsCal) 1Tablet(s) Oral four times a day  insulin lispro (HumaLOG) corrective regimen sliding scale  SubCutaneous three times a day before meals  dextrose 5%. 1000milliLiter(s) IV Continuous <Continuous>  dextrose 50% Injectable 12.5Gram(s) IV Push once  dextrose 50% Injectable 25Gram(s) IV Push once  dextrose 50% Injectable 25Gram(s) IV Push once  calcitriol   Capsule 0.5MICROGram(s) Oral two times a day  pantoprazole    Tablet 40milliGRAM(s) Oral before breakfast  ursodiol Capsule 300milliGRAM(s) Oral every 8 hours  dicyclomine 10milliGRAM(s) Oral three times a day before meals  furosemide   Injectable 20milliGRAM(s) IV Push two times a day    MEDICATIONS  (PRN):  metoclopramide Injectable 10milliGRAM(s) IV Push every 6 hours PRN Nausea and/or Vomiting  aluminum hydroxide/magnesium hydroxide/simethicone Suspension 30milliLiter(s) Oral every 4 hours PRN Dyspepsia  dextrose Gel 1Dose(s) Oral once PRN Blood Glucose LESS THAN 70 milliGRAM(s)/deciliter  glucagon  Injectable 1milliGRAM(s) IntraMuscular once PRN Glucose LESS THAN 70 milligrams/deciliter            EXAM:  RAD CHEST PORTABLE URGENT        PROCEDURE DATE:  2017         INTERPRETATION:  EXAMINATION: RAD CHEST PORTABLE URGENT    CLINICAL INDICATION:  Shortness of Breath.     TECHNIQUE: Frontal radiograph of the chest was obtained on 2017 at   5:29 PM     COMPARISON: Frontal radiograph of the chest 2017 at 4:00 AM.    FINDINGS:     The cardiomediastinal silhouette is enlarged in size. There is no pleural   effusion. There is no pneumothorax. No focal consolidation identified.   Unremarkable osseous structures.    IMPRESSION:   Clear lungs. Stable cardiomegaly.

## 2017-06-13 NOTE — DISCHARGE NOTE ADULT - MEDICATION SUMMARY - MEDICATIONS TO TAKE
I will START or STAY ON the medications listed below when I get home from the hospital:    aspirin 81 mg oral delayed release tablet  -- 1 tab(s) by mouth once a day  -- Indication: For CAD    calcium carbonate 1250 mg (500 mg elemental calcium) oral tablet  -- 3 tab(s) by mouth 4 times a day  -- Indication: For Hypocalcemia    Tradjenta 5 mg oral tablet  -- 1 tab(s) by mouth once a day  -- Indication: For DM (diabetes mellitus)    Reglan 10 mg oral tablet  -- 1 tab(s) by mouth every 6 hours  -- It is very important that you take or use this exactly as directed.  Do not skip doses or discontinue unless directed by your doctor.  May cause drowsiness.  Alcohol may intensify this effect.  Use care when operating dangerous machinery.  Take medication on an empty stomach 1 hour before or 2 to 3 hours after a meal unless otherwise directed by your doctor.    -- Indication: For Nausea    carvedilol 3.125 mg oral tablet  -- 1 tab(s) by mouth every 12 hours  -- Indication: For HTN (hypertension)    metOLazone 5 mg oral tablet  -- 1 tab(s) by mouth once a day  -- Indication: For HTN (hypertension)    hydroCHLOROthiazide 12.5 mg oral tablet  -- 1 tab(s) by mouth 2 times a day  -- Indication: For HTN (hypertension)    dicyclomine 10 mg oral capsule  -- 1 cap(s) by mouth 3 times a day (before meals)  -- Indication: For PPx    ursodiol 300 mg oral capsule  -- 1 cap(s) by mouth every 8 hours  -- Indication: For Abdominal pain    sucralfate 1 g oral tablet  -- 1 tab(s) by mouth 2 times a day  -- Indication: For Abdominal pain    pantoprazole 40 mg oral delayed release tablet  -- 1 tab(s) by mouth once a day (before a meal)  -- Indication: For Abdominal pain    levothyroxine 200 mcg (0.2 mg) oral tablet  -- 1 tab(s) by mouth once a day  -- Indication: For Hypothyroidism    calcitriol 0.5 mcg oral capsule  -- 1 cap(s) by mouth once a day  -- Indication: For Hypocalcemia    ergocalciferol  -- 12091 unit(s) by mouth every 7 days; take on Tuesdays at 6pm  -- Indication: For Hypocalcemia

## 2017-06-14 VITALS — DIASTOLIC BLOOD PRESSURE: 91 MMHG | HEART RATE: 78 BPM | SYSTOLIC BLOOD PRESSURE: 124 MMHG

## 2017-06-14 LAB
ALBUMIN SERPL ELPH-MCNC: 4 G/DL — SIGNIFICANT CHANGE UP (ref 3.3–5)
ALP SERPL-CCNC: 221 U/L — HIGH (ref 40–120)
ALT FLD-CCNC: 492 U/L — HIGH (ref 4–33)
AST SERPL-CCNC: 398 U/L — HIGH (ref 4–32)
BILIRUB DIRECT SERPL-MCNC: 2.4 MG/DL — HIGH (ref 0.1–0.2)
BILIRUB SERPL-MCNC: 3.8 MG/DL — HIGH (ref 0.2–1.2)
BUN SERPL-MCNC: 21 MG/DL — SIGNIFICANT CHANGE UP (ref 7–23)
CALCIUM SERPL-MCNC: 6.2 MG/DL — CRITICAL LOW (ref 8.4–10.5)
CHLORIDE SERPL-SCNC: 79 MMOL/L — LOW (ref 98–107)
CO2 SERPL-SCNC: 20 MMOL/L — LOW (ref 22–31)
CREAT SERPL-MCNC: 1.36 MG/DL — HIGH (ref 0.5–1.3)
GLUCOSE SERPL-MCNC: 155 MG/DL — HIGH (ref 70–99)
HCT VFR BLD CALC: 37.9 % — SIGNIFICANT CHANGE UP (ref 34.5–45)
HGB BLD-MCNC: 12.7 G/DL — SIGNIFICANT CHANGE UP (ref 11.5–15.5)
MAGNESIUM SERPL-MCNC: 2 MG/DL — SIGNIFICANT CHANGE UP (ref 1.6–2.6)
MCHC RBC-ENTMCNC: 25.1 PG — LOW (ref 27–34)
MCHC RBC-ENTMCNC: 33.5 % — SIGNIFICANT CHANGE UP (ref 32–36)
MCV RBC AUTO: 74.9 FL — LOW (ref 80–100)
PHOSPHATE SERPL-MCNC: 4.4 MG/DL — SIGNIFICANT CHANGE UP (ref 2.5–4.5)
PLATELET # BLD AUTO: 311 K/UL — SIGNIFICANT CHANGE UP (ref 150–400)
PMV BLD: 11.5 FL — SIGNIFICANT CHANGE UP (ref 7–13)
POTASSIUM SERPL-MCNC: 4 MMOL/L — SIGNIFICANT CHANGE UP (ref 3.5–5.3)
POTASSIUM SERPL-SCNC: 4 MMOL/L — SIGNIFICANT CHANGE UP (ref 3.5–5.3)
PROT SERPL-MCNC: 7 G/DL — SIGNIFICANT CHANGE UP (ref 6–8.3)
RBC # BLD: 5.06 M/UL — SIGNIFICANT CHANGE UP (ref 3.8–5.2)
RBC # FLD: 16 % — HIGH (ref 10.3–14.5)
SODIUM SERPL-SCNC: 123 MMOL/L — LOW (ref 135–145)
WBC # BLD: 10.54 K/UL — HIGH (ref 3.8–10.5)
WBC # FLD AUTO: 10.54 K/UL — HIGH (ref 3.8–10.5)

## 2017-06-14 RX ORDER — FUROSEMIDE 40 MG
20 TABLET ORAL DAILY
Qty: 0 | Refills: 0 | Status: DISCONTINUED | OUTPATIENT
Start: 2017-06-15 | End: 2017-06-14

## 2017-06-14 RX ORDER — CARVEDILOL PHOSPHATE 80 MG/1
3.12 CAPSULE, EXTENDED RELEASE ORAL EVERY 12 HOURS
Qty: 0 | Refills: 0 | Status: DISCONTINUED | OUTPATIENT
Start: 2017-06-14 | End: 2017-06-14

## 2017-06-14 RX ORDER — CALCIUM GLUCONATE 100 MG/ML
1 VIAL (ML) INTRAVENOUS ONCE
Qty: 0 | Refills: 0 | Status: COMPLETED | OUTPATIENT
Start: 2017-06-14 | End: 2017-06-14

## 2017-06-14 RX ADMIN — Medication 20 MILLIGRAM(S): at 05:57

## 2017-06-14 RX ADMIN — HEPARIN SODIUM 5000 UNIT(S): 5000 INJECTION INTRAVENOUS; SUBCUTANEOUS at 05:58

## 2017-06-14 RX ADMIN — Medication 1 TABLET(S): at 05:57

## 2017-06-14 RX ADMIN — Medication 100 GRAM(S): at 09:54

## 2017-06-14 RX ADMIN — URSODIOL 300 MILLIGRAM(S): 250 TABLET, FILM COATED ORAL at 05:58

## 2017-06-14 RX ADMIN — Medication 81 MILLIGRAM(S): at 11:56

## 2017-06-14 RX ADMIN — Medication 10 MILLIGRAM(S): at 11:56

## 2017-06-14 RX ADMIN — Medication 10 MILLIGRAM(S): at 07:07

## 2017-06-14 RX ADMIN — Medication 1 TABLET(S): at 00:08

## 2017-06-14 RX ADMIN — Medication 200 MICROGRAM(S): at 05:57

## 2017-06-14 RX ADMIN — CARVEDILOL PHOSPHATE 3.12 MILLIGRAM(S): 80 CAPSULE, EXTENDED RELEASE ORAL at 05:57

## 2017-06-14 RX ADMIN — PANTOPRAZOLE SODIUM 40 MILLIGRAM(S): 20 TABLET, DELAYED RELEASE ORAL at 07:07

## 2017-06-14 RX ADMIN — Medication 1: at 09:53

## 2017-06-14 RX ADMIN — Medication 1 GRAM(S): at 05:57

## 2017-06-14 RX ADMIN — Medication 1 TABLET(S): at 11:56

## 2017-06-14 RX ADMIN — CALCITRIOL 0.5 MICROGRAM(S): 0.5 CAPSULE ORAL at 05:56

## 2017-06-14 RX ADMIN — Medication 10 MILLIGRAM(S): at 05:06

## 2017-06-14 NOTE — PROVIDER CONTACT NOTE (CRITICAL VALUE NOTIFICATION) - ASSESSMENT
Pt is A&OX4. VSS.
Pt asymptomatic.
Pt asymptomatic.
Pt asymptomatic, VSS, afebrile.
pt asymptomatic, no signs of distress noted.

## 2017-06-14 NOTE — PROGRESS NOTE ADULT - NSHPATTENDINGPLANDISCUSS_GEN_ALL_CORE
patient in detail . 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 I can take decision myself.
Daughter [phone]  in detail. Gastroentrology and cardiology.

## 2017-06-14 NOTE — PROGRESS NOTE ADULT - SUBJECTIVE AND OBJECTIVE BOX
S: patient complains of nausea, denies abdominal pain, she describes her urine as "orange"     O: Vital Signs Last 24 Hrs  T(C): 36.5, Max: 36.5 (06-14 @ 05:54)  T(F): 97.7, Max: 97.7 (06-14 @ 05:54)  HR: 75 (74 - 80)  BP: 128/91 (114/83 - 128/91)  BP(mean): --  RR: 18 (18 - 18)  SpO2: 98% (98% - 100%)    Gen: jaundiced   Abd: soft, ND, NTTP                             12.7   10.54 )-----------( 311      ( 14 Jun 2017 05:45 )             37.9     06-13    127<L>  |  83<L>  |  20  ----------------------------<  151<H>  3.9   |  23  |  1.26    Ca    6.2<LL>      13 Jun 2017 07:30  Phos  4.5     06-13  Mg     1.5     06-13    TPro  6.8  /  Alb  3.8  /  TBili  3.2<H>  /  DBili  1.8<H>  /  AST  373<H>  /  ALT  414<H>  /  AlkPhos  218<H>  06-13

## 2017-06-14 NOTE — PROGRESS NOTE ADULT - PROBLEM SELECTOR PLAN 5
Refusing ICD and will think about it.
BP meds.
Better.
Resolved. Tolerating po. Refusing tests to R/O gastroparesis.

## 2017-06-14 NOTE — PROGRESS NOTE ADULT - ASSESSMENT
62yo F h/o DM2 (A1C 7.5 5/17), CHFrEF,  thyroid CA s/p thyroidectomy c/b persistent hypocalcemia, hypertension, hyperlipidemia, chronic RBBB,  obesity, thyroid CA, history of nonischemic cardiomyopathy with an ejection fraction of about 25%and normal coronaries on a cardiac catheterization performed in 2014.  The patient states she wore a LifeVest for three months, however, after that time it was determined that she did not require a defibrillator and Cholelithiasis p/w persistent abdominal pain with nausea and found to have transaminitis. On patients last admission in May 2017 she underwent EGD with esophagitis and duodenopathy and MRCP was also obtained and did not show any evidence of biliary obstruction at that time. Further imaging done on this admission with CT Abd showed 'Contracted gallbladder with intraluminal stones. Nonspecific   gallbladder wall thickening or edema. Appearance is unchanged from the prior recent CT and MRI examinations.' Surgery evaluated the patient and feels the RUQ abdominal pain with nausea and SOB are more likely related to the patient's NICM/CHF w/low EF that is causing hepatic congestion/transaminitis. The patient is also noted with increasing hyperbilirubinemia from previous admission also likely a result of cardiac issues given recent MRCP and CT reveal no biliary obstruction
62yo F h/o DM2 (A1C 7.5 5/17), CHFrEF,  thyroid CA s/p thyroidectomy c/b persistent hypocalcemia, hypertension, hyperlipidemia, chronic RBBB,  obesity, thyroid CA, history of nonischemic cardiomyopathy with an ejection fraction of about 25%and normal coronaries on a cardiac catheterization performed in 2014.  The patient states she wore a LifeVest for three months, however, after that time it was determined that she did not require a defibrillator and Cholelithiasis p/w persistent abdominal pain with nausea and found to have transaminitis. On patients last admission in May 2017 she underwent EGD with esophagitis and duodenopathy and MRCP was also obtained and did not show any evidence of biliary obstruction at that time. Further imaging done on this admission with CT Abd showed 'Contracted gallbladder with intraluminal stones. Nonspecific   gallbladder wall thickening or edema. Appearance is unchanged from the prior recent CT and MRI examinations.' Surgery evaluated the patient and feels the RUQ abdominal pain with nausea and SOB are more likely related to the patient's NICM/CHF w/low EF that is causing hepatic congestion/transaminitis. The patient is also noted with increasing hyperbilirubinemia from previous admission also likely a result of cardiac issues given recent MRCP and CT reveal no biliary obstruction
A/P: This patient was previously evaluated on 6/9/17 AM for abdominal pain which has been persistent and previously been worked up to be negative for gallbladder pathology this year including but not limited to  MRCP/EGD/CT Abdomen/Pelvis/RUQ US/HIDA. In addition, she has been diagnosed and attempted to be treated for gastroparesis gastritis as of this year and recommended to establish care with gastroenterology, which she has not done. This patient was discharged from the ED the evening of 6/11/17 with the understanding she would follow-up with Gastroenterology, instead returned to the ED 6/10/17 with the complaint of persistent nausea and "bilious" vomiting. In short this is a morbidly obese 61- year old female who presents now a third time in a month with persistent vague symptoms of upper abdominal pain, nausea, vomiting, fatigue, dyspnea, and body aches. Her LFTs were elevated yesterday, she underwent an MRCP yesterday, awaiting final report.   - Trend LFTs   - Follow official report of MRCP.   - Obtain gastric emptying study.
60 y/o woman with sCHF, HTN, DM, thyroid CA s/p thyroidectomy c/b persistent hypocalcemia and cholelithiasis p/w upper abdominal pain.
60 y/o F PMH DM2 (A1C 7.5 5/17), CHFrEF, HTN, thyroid CA s/p thyroidectomy c/b persistent hypocalcemia, Cholelithiasis p/w persistent abdominal pain, nausea and vomiting. Patient has come to hospital, several ED visits and multiple medical admissions, in past month for similar symptoms. On current admission patient also has metabolic derangements of MAGNUS, hyponatremia likely in setting of dehydration from nausea and vomiting.

## 2017-06-14 NOTE — PROGRESS NOTE ADULT - PROBLEM SELECTOR PROBLEM 2
NICM (nonischemic cardiomyopathy)
Chronic systolic congestive heart failure
NICM (nonischemic cardiomyopathy)
MAGNUS (acute kidney injury)
NICM (nonischemic cardiomyopathy)
Transaminitis
Hyponatremia

## 2017-06-14 NOTE — PROGRESS NOTE ADULT - SUBJECTIVE AND OBJECTIVE BOX
No chest pain or sob.       aluminum hydroxide/magnesium hydroxide/simethicone Suspension 30milliLiter(s) Oral every 4 hours PRN  heparin  Injectable 5000Unit(s) SubCutaneous every 8 hours  sucralfate 1Gram(s) Oral two times a day  aspirin enteric coated 81milliGRAM(s) Oral daily  levothyroxine 200MICROGram(s) Oral daily  ergocalciferol 60486Zygr(s) Oral every week  calcium carbonate 1250 mG (OsCal) 1Tablet(s) Oral four times a day  insulin lispro (HumaLOG) corrective regimen sliding scale  SubCutaneous three times a day before meals  dextrose 5%. 1000milliLiter(s) IV Continuous <Continuous>  dextrose Gel 1Dose(s) Oral once PRN  dextrose 50% Injectable 12.5Gram(s) IV Push once  dextrose 50% Injectable 25Gram(s) IV Push once  dextrose 50% Injectable 25Gram(s) IV Push once  glucagon  Injectable 1milliGRAM(s) IntraMuscular once PRN  calcitriol   Capsule 0.5MICROGram(s) Oral two times a day  pantoprazole    Tablet 40milliGRAM(s) Oral before breakfast  ursodiol Capsule 300milliGRAM(s) Oral every 8 hours  dicyclomine 10milliGRAM(s) Oral three times a day before meals  carvedilol 3.125milliGRAM(s) Oral every 12 hours                            12.7   10.54 )-----------( 311      ( 14 Jun 2017 05:45 )             37.9       06-14    123<L>  |  79<L>  |  21  ----------------------------<  155<H>  4.0   |  20<L>  |  1.36<H>    Ca    6.2<LL>      14 Jun 2017 05:45  Phos  4.4     06-14  Mg     2.0     06-14    TPro  7.0  /  Alb  4.0  /  TBili  3.8<H>  /  DBili  2.4<H>  /  AST  398<H>  /  ALT  492<H>  /  AlkPhos  221<H>  06-14      CARDIAC MARKERS ( 12 Jun 2017 23:45 )  x     / < 0.06 ng/mL / 755 u/L / 5.27 ng/mL / x      CARDIAC MARKERS ( 12 Jun 2017 19:30 )  x     / < 0.06 ng/mL / 806 u/L / 5.25 ng/mL / x      CARDIAC MARKERS ( 12 Jun 2017 03:00 )  x     / < 0.06 ng/mL / 801 u/L / 5.04 ng/mL / x      CARDIAC MARKERS ( 11 Jun 2017 15:20 )  x     / < 0.06 ng/mL / 856 u/L / x     / x            T(C): 36.5, Max: 36.5 (06-14 @ 05:54)  HR: 78 (74 - 80)  BP: 124/91 (114/83 - 128/91)  RR: 18 (18 - 18)  SpO2: 98% (98% - 100%)  Wt(kg): --    I&O's Summary    I & Os for current day (as of 14 Jun 2017 14:16)  =============================================  IN: 0 ml / OUT: 400 ml / NET: -400 ml            T(C): 36.8, Max: 36.8 (06-13 @ 05:27)  HR: 78 (78 - 88)  BP: 114/72 (104/76 - 124/91)  RR: 19 (16 - 19)  SpO2: 95% (95% - 100%)  Wt(kg): --    Heart: nl s1, s2, rrr, no m/r/g  -Lungs: cta b/l  No jvd  Ext no pitting edema       A/P) Ms. Carson is a pleasant 61-year-old female with past medical history of hypertension, hyperlipidemia, obesity, history of nonischemic cardiomyopathy admitted with abdominal pain.     - No clinical heart failure on exam (no JVD, clear lungs)  - Pt. refusing further CV workup including AICD and possible RHC despite understanding risks of refusal including but not limited to death   - F/u GI re: abnormal LFT's  - Primary care per medicine

## 2017-06-14 NOTE — PROGRESS NOTE ADULT - PROBLEM SELECTOR PLAN 1
Patient has had persistent abd pain for past month with workup including several abd u/s and CT abd significant for cholelithiasis w/out cholecystitis, negative HIDA scan, MRCP significant for cholecystitis and EGD significant for gastritis   -Sx likely 2/2 gastroparesis exacerbations with components of hypocalcemia + congestive hepatopathy   -C/w Reglan for nausea control, will attempt to avoid opiates as they should not be used for gastroparesis  -No acute surg intervention- Ursodial for gallstones  -F/u GI recs in regards to rising Tbili
Exact cause not know.. S/P MRCP   IMPRESSION:   Suboptimal exam.  No obvious choledocholithiasis or biliary ductal dilatation.  Gallstones in a contracted gallbladder.    D/W GI attending and plan is RT heart Cath . If negative then Liver BX. Pt refusing both . Said I very well understand risks but will like to go to [place] for all tests.
IV lasix .
Resolved and was able to tolerate liquid diet.
cmp daily  avoid hepatotoxins  elevated lft's/bili & abdominal pain likely 2/2 RHF but r/o cbd stone with MRCP results pending
cmp daily  avoid hepatotoxins  elevated lft's/bili & abdominal pain likely 2/2 RHF
Prerenal. Renal function improving.

## 2017-06-14 NOTE — PROGRESS NOTE ADULT - ATTENDING COMMENTS
lfts worsening   mrcp does not show biliary obstruction  I suggested patient undergo right heart cath to eval right sided pressures, if that is negative patient may need liver biopsy  patient is refusing to stay here  she wants to go to her "own doctors at [facility]"  I advised her that since her liver enzymes are worse she should consider staying
Pt is very adamant in leaving today . Risks of leaving AMA explained in detail including worsening of  liver condition etc.  Said I very well understand and will go to [place] for further treatment.

## 2017-06-14 NOTE — PROVIDER CONTACT NOTE (CRITICAL VALUE NOTIFICATION) - SITUATION
Patient calcium levels 6.2
Pt with total calcium of 6.1
as above
calcium total 5.7
Pt with calcium of 6.3

## 2017-06-14 NOTE — PROGRESS NOTE ADULT - PROBLEM SELECTOR PLAN 2
Hold Diuretics in setting of MAGNUS  Doesn't appear to be in acute CHF   F/u cards recs in regards to EDG
Refusing AICD.
Renal consult noted and help appreciated.
Same plan as above.
care per cardiology
care per cardiology
Na+ stable. Lasix 40 mg IV once given last night. Lasix 20 mg IV bid ordered today  -  Favor HCTZ 50mg po qd and no loop diuretics if sodium does not further worsen

## 2017-06-14 NOTE — PROGRESS NOTE ADULT - SUBJECTIVE AND OBJECTIVE BOX
No pain, no shortness of breath      MEDICATIONS  (STANDING):  heparin  Injectable 5000Unit(s) SubCutaneous every 8 hours  sucralfate 1Gram(s) Oral two times a day  aspirin enteric coated 81milliGRAM(s) Oral daily  carvedilol 3.125milliGRAM(s) Oral every 12 hours  levothyroxine 200MICROGram(s) Oral daily  ergocalciferol 65406Ejfz(s) Oral every week  calcium carbonate 1250 mG (OsCal) 1Tablet(s) Oral four times a day  insulin lispro (HumaLOG) corrective regimen sliding scale  SubCutaneous three times a day before meals  dextrose 5%. 1000milliLiter(s) IV Continuous <Continuous>  dextrose 50% Injectable 12.5Gram(s) IV Push once  dextrose 50% Injectable 25Gram(s) IV Push once  dextrose 50% Injectable 25Gram(s) IV Push once  calcitriol   Capsule 0.5MICROGram(s) Oral two times a day  pantoprazole    Tablet 40milliGRAM(s) Oral before breakfast  ursodiol Capsule 300milliGRAM(s) Oral every 8 hours  dicyclomine 10milliGRAM(s) Oral three times a day before meals  furosemide   Injectable 20milliGRAM(s) IV Push two times a day      VITAL:  T(C): , Max: 36.5 (06-14 @ 05:54)  T(F): , Max: 97.7 (06-14 @ 05:54)  HR: 75  BP: 128/91  BP(mean): --  RR: 18  SpO2: 98%      PHYSICAL EXAM:  Constitutional: NAD, Alert  HEENT: NCAT, DMM;  Neck: Supple, No JVD  Respiratory: CTA-b/l  Cardiovascular: RRR s1s2, no m/r/g  Gastrointestinal: BS+, soft, NT/ND  Extremities: No peripheral edema b/l        LABS:                        12.7   10.54 )-----------( 311      ( 14 Jun 2017 05:45 )             37.9     Na(127)/K(3.9)/Cl(83)/HCO3(23)/BUN(20)/Cr(1.26)Glu(151)/Ca(6.2)/Mg(1.5)/PO4(4.5)    06-13 @ 07:30  Na(127)/K(4.4)/Cl(82)/HCO3(23)/BUN(22)/Cr(1.32)Glu(220)/Ca(6.3)/Mg(--)/PO4(--)    06-12 @ 19:30  Na(129)/K(4.3)/Cl(85)/HCO3(22)/BUN(23)/Cr(1.39)Glu(101)/Ca(6.1)/Mg(--)/PO4(--)    06-12 @ 03:00  Na(127)/K(4.1)/Cl(84)/HCO3(21)/BUN(24)/Cr(1.44)Glu(167)/Ca(5.7)/Mg(1.6)/PO4(5.2)    06-11 @ 15:20    IMPRESSION:  (1)Renal - MAGNUS - prerenal +/- CLAU; resolved.    (2)Hypocalcemia - surgically-induced hypoparathyroidism; exacerbated by loop diuretics. Somewhat improved relative to admission; labs today are pending.    (3)Hyponatremia - stable at 127 of late; labs today are pending.    (4)CV - systolic CHF - appreciate Cardiology input. Does she need the Lasix? She has been refusing RHC. I would favor a regimen that allows her not to become too hypocalcemic. If sodium allows, would maximize use of thiazide, and limit use of loop diuretics as able.    RECOMMEND:    (1)Oscal + Calcitriol as ordered  (2)1 liter free water restriction  (3)Lasix 20mg po qd + Metolazone 5qd                    Brian Henry MD  [facility]  [phone] No SOB. (+)crampy epigastric pain. (+)mild nausea.      MEDICATIONS  (STANDING):  heparin  Injectable 5000Unit(s) SubCutaneous every 8 hours  sucralfate 1Gram(s) Oral two times a day  aspirin enteric coated 81milliGRAM(s) Oral daily  carvedilol 3.125milliGRAM(s) Oral every 12 hours  levothyroxine 200MICROGram(s) Oral daily  ergocalciferol 30448Jegn(s) Oral every week  calcium carbonate 1250 mG (OsCal) 1Tablet(s) Oral four times a day  insulin lispro (HumaLOG) corrective regimen sliding scale  SubCutaneous three times a day before meals  dextrose 5%. 1000milliLiter(s) IV Continuous <Continuous>  dextrose 50% Injectable 12.5Gram(s) IV Push once  dextrose 50% Injectable 25Gram(s) IV Push once  dextrose 50% Injectable 25Gram(s) IV Push once  calcitriol   Capsule 0.5MICROGram(s) Oral two times a day  pantoprazole    Tablet 40milliGRAM(s) Oral before breakfast  ursodiol Capsule 300milliGRAM(s) Oral every 8 hours  dicyclomine 10milliGRAM(s) Oral three times a day before meals  furosemide   Injectable 20milliGRAM(s) IV Push two times a day      VITAL:  T(C): , Max: 36.5 (06-14 @ 05:54)  T(F): , Max: 97.7 (06-14 @ 05:54)  HR: 75  BP: 128/91  BP(mean): --  RR: 18  SpO2: 98%      PHYSICAL EXAM:  Constitutional: NAD, Alert  HEENT: NCAT, DMM;  Neck: Supple, No JVD  Respiratory: CTA-b/l  Cardiovascular: RRR s1s2, no m/r/g  Gastrointestinal: BS+, soft, NT; (+)mild distension  Extremities: No peripheral edema b/l        LABS:                        12.7   10.54 )-----------( 311      ( 14 Jun 2017 05:45 )             37.9     Na(127)/K(3.9)/Cl(83)/HCO3(23)/BUN(20)/Cr(1.26)Glu(151)/Ca(6.2)/Mg(1.5)/PO4(4.5)    06-13 @ 07:30  Na(127)/K(4.4)/Cl(82)/HCO3(23)/BUN(22)/Cr(1.32)Glu(220)/Ca(6.3)/Mg(--)/PO4(--)    06-12 @ 19:30  Na(129)/K(4.3)/Cl(85)/HCO3(22)/BUN(23)/Cr(1.39)Glu(101)/Ca(6.1)/Mg(--)/PO4(--)    06-12 @ 03:00  Na(127)/K(4.1)/Cl(84)/HCO3(21)/BUN(24)/Cr(1.44)Glu(167)/Ca(5.7)/Mg(1.6)/PO4(5.2)    06-11 @ 15:20    IMPRESSION:  (1)Renal - MAGNUS - prerenal +/- CLAU; resolved.    (2)Hypocalcemia - surgically-induced hypoparathyroidism; exacerbated by loop diuretics. Somewhat improved relative to admission; labs today are pending.    (3)Hyponatremia - stable at 127 of late; labs today are pending.    (4)CV - systolic CHF - appreciate Cardiology input. Does she need the Lasix? She has been refusing RHC. I would favor a regimen that allows her not to become too hypocalcemic. If sodium allows, would maximize use of thiazide, and limit use of loop diuretics as able.    RECOMMEND:    (1)Oscal + Calcitriol as ordered  (2)1 liter free water restriction  (3)Lasix 20mg po qd + Metolazone 5qd                    Brian Henry MD  [facility]  [phone]

## 2017-06-14 NOTE — PROGRESS NOTE ADULT - PROBLEM SELECTOR PLAN 4
BP meds.
Diuretics and refusing RT heart caath.
Replacing.
cardiology consult noted and needs AICD which she is d/w her cardiologist Dr Chavarria at [facility] .
Improving with no pain today. UGI Series pending

## 2017-06-14 NOTE — PROGRESS NOTE ADULT - SUBJECTIVE AND OBJECTIVE BOX
INTERVAL HPI/OVERNIGHT EVENTS: Patient dressed and said please send me home as I feel fine. resident in room with me. I am eating ok and no more abd pain,nausea or vomiting.   Vital Signs Last 24 Hrs  T(C): 36.5, Max: 36.5 (06-14 @ 05:54)  T(F): 97.7, Max: 97.7 (06-14 @ 05:54)  HR: 78 (74 - 80)  BP: 124/91 (114/83 - 128/91)  BP(mean): --  RR: 18 (18 - 18)  SpO2: 98% (98% - 100%)  I&O's Summary    I & Os for current day (as of 14 Jun 2017 13:56)  =============================================  IN: 0 ml / OUT: 400 ml / NET: -400 ml    MEDICATIONS  (STANDING):  heparin  Injectable 5000Unit(s) SubCutaneous every 8 hours  sucralfate 1Gram(s) Oral two times a day  aspirin enteric coated 81milliGRAM(s) Oral daily  levothyroxine 200MICROGram(s) Oral daily  ergocalciferol [id number](s) Oral every week  calcium carbonate 1250 mG (OsCal) 1Tablet(s) Oral four times a day  insulin lispro (HumaLOG) corrective regimen sliding scale  SubCutaneous three times a day before meals  dextrose 5%. 1000milliLiter(s) IV Continuous <Continuous>  dextrose 50% Injectable 12.5Gram(s) IV Push once  dextrose 50% Injectable 25Gram(s) IV Push once  dextrose 50% Injectable 25Gram(s) IV Push once  calcitriol   Capsule 0.5MICROGram(s) Oral two times a day  pantoprazole    Tablet 40milliGRAM(s) Oral before breakfast  ursodiol Capsule 300milliGRAM(s) Oral every 8 hours  dicyclomine 10milliGRAM(s) Oral three times a day before meals  carvedilol 3.125milliGRAM(s) Oral every 12 hours    MEDICATIONS  (PRN):  aluminum hydroxide/magnesium hydroxide/simethicone Suspension 30milliLiter(s) Oral every 4 hours PRN Dyspepsia  dextrose Gel 1Dose(s) Oral once PRN Blood Glucose LESS THAN 70 milliGRAM(s)/deciliter  glucagon  Injectable 1milliGRAM(s) IntraMuscular once PRN Glucose LESS THAN 70 milligrams/deciliter    LABS:                        12.7   10.54 )-----------( 311      ( 14 Jun 2017 05:45 )             37.9     06-14    123<L>  |  79<L>  |  21  ----------------------------<  155<H>  4.0   |  20<L>  |  1.36<H>    Ca    6.2<LL>      14 Jun 2017 05:45  Phos  4.4     06-14  Mg     2.0     06-14    TPro  7.0  /  Alb  4.0  /  TBili  3.8<H>  /  DBili  2.4<H>  /  AST  398<H>  /  ALT  492<H>  /  AlkPhos  221<H>  06-14        CAPILLARY BLOOD GLUCOSE  191 (14 Jun 2017 12:34)  151 (14 Jun 2017 08:28)  155 (13 Jun 2017 21:17)  184 (13 Jun 2017 17:21)          REVIEW OF SYSTEMS:  CONSTITUTIONAL: No fever, weight loss, or fatigue  EYES: No eye pain, visual disturbances, or discharge  ENMT:  No difficulty hearing, tinnitus, vertigo; No sinus or throat pain  NECK: No pain or stiffness  BREASTS: No pain, masses, or nipple discharge  RESPIRATORY: No cough, wheezing, chills or hemoptysis; No shortness of breath  CARDIOVASCULAR: No chest pain, palpitations, dizziness, or leg swelling  GASTROINTESTINAL: No abdominal or epigastric pain. No nausea, vomiting, or hematemesis; No diarrhea or constipation. No melena or hematochezia.  GENITOURINARY: No dysuria, frequency, hematuria, or incontinence  NEUROLOGICAL: No headaches, memory loss, loss of strength, numbness, or tremors  SKIN: No itching, burning, rashes, or lesions   LYMPH NODES: No enlarged glands  ENDOCRINE: No heat or cold intolerance; No hair loss  MUSCULOSKELETAL: No joint pain or swelling; No muscle, back, or extremity pain  PSYCHIATRIC: No depression, anxiety, mood swings, or difficulty sleeping  HEME/LYMPH: No easy bruising, or bleeding gums  ALLERY AND IMMUNOLOGIC: No hives or eczema    RADIOLOGY & ADDITIONAL TESTS:    Imaging Personally Reviewed:  [ ] YES  [ ] NO    Consultant(s) Notes Reviewed:  [x ] YES  [ ] NO    PHYSICAL EXAM:  GENERAL: NAD, well-groomed, well-developed  HEAD:  Atraumatic, Normocephalic  EYES: EOMI, PERRLA, conjunctiva and sclera clear  ENMT: No tonsillar erythema, exudates, or enlargement; Moist mucous membranes, Good dentition, No lesions  NECK: Supple, No JVD, Normal thyroid  NERVOUS SYSTEM:  Alert & Oriented X3, Good concentration; Motor Strength 5/5 B/L upper and lower extremities; DTRs 2+ intact and symmetric  CHEST/LUNG: Clear to percussion bilaterally; No rales, rhonchi, wheezing, or rubs  HEART: Regular rate and rhythm; No murmurs, rubs, or gallops  ABDOMEN: Soft, Nontender, Nondistended; Bowel sounds present  EXTREMITIES:  2+ Peripheral Pulses, No clubbing, cyanosis, or edema  LYMPH: No lymphadenopathy noted  SKIN: No rashes or lesions    Care Discussed with Consultants/Other Providers [ x] YES  [ ] NO

## 2017-06-14 NOTE — PROGRESS NOTE ADULT - PROBLEM SELECTOR PLAN 7
GI and Surgery helping and no intervention presently.
On replacement.
Resolved.
Likely in setting of dehydration  C/w IVF

## 2017-06-14 NOTE — PROGRESS NOTE ADULT - PROBLEM SELECTOR PROBLEM 3
Gastroparesis
Diabetes mellitus type 2 in obese
Gastroparesis
Hypocalcemia
NICM (nonischemic cardiomyopathy)
Obstructive jaundice
Hypocalcemia

## 2017-06-14 NOTE — PROGRESS NOTE ADULT - SUBJECTIVE AND OBJECTIVE BOX
INTERVAL HPI/OVERNIGHT EVENTS:    c/o nausea for which patient received reglan for with relief; no vomiting  denied abdominal pain     MEDICATIONS  (STANDING):  heparin  Injectable 5000Unit(s) SubCutaneous every 8 hours  sucralfate 1Gram(s) Oral two times a day  aspirin enteric coated 81milliGRAM(s) Oral daily  carvedilol 3.125milliGRAM(s) Oral every 12 hours  levothyroxine 200MICROGram(s) Oral daily  ergocalciferol 22651Qcfd(s) Oral every week  calcium carbonate 1250 mG (OsCal) 1Tablet(s) Oral four times a day  insulin lispro (HumaLOG) corrective regimen sliding scale  SubCutaneous three times a day before meals  dextrose 5%. 1000milliLiter(s) IV Continuous <Continuous>  dextrose 50% Injectable 12.5Gram(s) IV Push once  dextrose 50% Injectable 25Gram(s) IV Push once  dextrose 50% Injectable 25Gram(s) IV Push once  calcitriol   Capsule 0.5MICROGram(s) Oral two times a day  pantoprazole    Tablet 40milliGRAM(s) Oral before breakfast  ursodiol Capsule 300milliGRAM(s) Oral every 8 hours  dicyclomine 10milliGRAM(s) Oral three times a day before meals  calcium gluconate IVPB 1Gram(s) IV Intermittent once    MEDICATIONS  (PRN):  aluminum hydroxide/magnesium hydroxide/simethicone Suspension 30milliLiter(s) Oral every 4 hours PRN Dyspepsia  dextrose Gel 1Dose(s) Oral once PRN Blood Glucose LESS THAN 70 milliGRAM(s)/deciliter  glucagon  Injectable 1milliGRAM(s) IntraMuscular once PRN Glucose LESS THAN 70 milligrams/deciliter      Allergies    penicillin (Rash)    Intolerances        Review of Systems:    General:  No wt loss, fevers, chills, night sweats,fatigue,   Eyes:  Good vision, no reported pain  ENT:  No sore throat, pain, runny nose, dysphagia  CV:  No pain, palpitatioins, hypo/hypertension  Resp:  No dyspnea, cough, tachypnea, wheezing  GI:  No pain, No nausea, No vomiting, No diarrhea, No constipatiion, No weight loss, No fever, No pruritis, No rectal bleeding, No tarry stools, No dysphagia,  :  No pain, bleeding, incontinence, nocturia  Muscle:  No pain, weakness  Neuro:  No weakness, tingling, memory problems  Psych:  No fatigue, insomnia, mood problems, depression  Endocrine:  No polyuria, polydypsia, cold/heat intolerance  Heme:  No petechiae, ecchymosis, easy bruisability  Skin:  No rash, tattoos, scars, edema      Vital Signs Last 24 Hrs  T(C): 36.5, Max: 36.5 (06-14 @ 05:54)  T(F): 97.7, Max: 97.7 (06-14 @ 05:54)  HR: 75 (74 - 80)  BP: 128/91 (114/83 - 128/91)  BP(mean): --  RR: 18 (18 - 18)  SpO2: 98% (98% - 100%)    PHYSICAL EXAM:    Constitutional: NAD, well-developed  HEENT: EOMI, throat clear  Neck: No LAD, supple  Respiratory: CTA and P  Cardiovascular: S1 and S2, RRR, no M  Gastrointestinal: BS+, soft, NT/ND, neg HSM,  Extremities: No peripheral edema, neg clubing, cyanosis  Vascular: 2+ peripheral pulses  Neurological: A/O x 3, no focal deficits  Psychiatric: Normal mood, normal affect  Skin: No rashes      LABS:                        12.7   10.54 )-----------( 311      ( 14 Jun 2017 05:45 )             37.9     06-14    123<L>  |  79<L>  |  21  ----------------------------<  155<H>  4.0   |  20<L>  |  1.36<H>    Ca    6.2<LL>      14 Jun 2017 05:45  Phos  4.4     06-14  Mg     2.0     06-14    TPro  7.0  /  Alb  4.0  /  TBili  3.8<H>  /  DBili  2.4<H>  /  AST  398<H>  /  ALT  492<H>  /  AlkPhos  221<H>  06-14          RADIOLOGY & ADDITIONAL TESTS:    MRCP -- pending results

## 2017-06-14 NOTE — PROGRESS NOTE ADULT - PROBLEM SELECTOR PLAN 3
as per discussions with patients cardiologist at [facility], suspect nausea and abdominal pain associated with RHF and possibly gastroparesis  cont ppi/carafate  UGI Series pending; need to hold reglan for this test

## 2017-06-14 NOTE — PROVIDER CONTACT NOTE (CRITICAL VALUE NOTIFICATION) - BACKGROUND
Pt admitted w/ abdominal pain, nausea, vomiting / CHF.
Pt with CHF. Admit dx of abdominal pain, n/v. Hx of chronic hypocalcemia.
Pt dx with abdominal pain,. hx of CHF on Lasix IV
pt admitted with abdominal pain, nausea/ vomiting
Pt with admit dx of abdominal pain. Hx of CHF, DM II, and chronic hypocalcemia.

## 2017-06-14 NOTE — PROGRESS NOTE ADULT - PROBLEM SELECTOR PROBLEM 1
Abdominal pain
Transaminitis
Transaminitis
Abdominal pain
Acute on chronic systolic congestive heart failure
Obstructive jaundice
MAGNUS (acute kidney injury)

## 2017-06-14 NOTE — PROGRESS NOTE ADULT - PROBLEM SELECTOR PLAN 6
BP meds.
Contniue home regimen. Sugars in good range.
Fluid restrictions and off diuretics.
Cr elevated likely in setting of dehydration  C/w IVF

## 2017-06-14 NOTE — PROGRESS NOTE ADULT - SUBJECTIVE AND OBJECTIVE BOX
EP ATTENDING    No palpitations, no syncope, no angina    LFTs/bilirubin continue to rise despite no evidence of right sided volume overload on exam    aluminum hydroxide/magnesium hydroxide/simethicone Suspension 30milliLiter(s) Oral every 4 hours PRN  heparin  Injectable 5000Unit(s) SubCutaneous every 8 hours  sucralfate 1Gram(s) Oral two times a day  aspirin enteric coated 81milliGRAM(s) Oral daily  carvedilol 3.125milliGRAM(s) Oral every 12 hours  levothyroxine 200MICROGram(s) Oral daily  ergocalciferol 17099Kgia(s) Oral every week  calcium carbonate 1250 mG (OsCal) 1Tablet(s) Oral four times a day  insulin lispro (HumaLOG) corrective regimen sliding scale  SubCutaneous three times a day before meals  dextrose 5%. 1000milliLiter(s) IV Continuous <Continuous>  dextrose Gel 1Dose(s) Oral once PRN  dextrose 50% Injectable 12.5Gram(s) IV Push once  dextrose 50% Injectable 25Gram(s) IV Push once  dextrose 50% Injectable 25Gram(s) IV Push once  glucagon  Injectable 1milliGRAM(s) IntraMuscular once PRN  calcitriol   Capsule 0.5MICROGram(s) Oral two times a day  pantoprazole    Tablet 40milliGRAM(s) Oral before breakfast  ursodiol Capsule 300milliGRAM(s) Oral every 8 hours  dicyclomine 10milliGRAM(s) Oral three times a day before meals                            12.7   10.54 )-----------( 311      ( 14 Jun 2017 05:45 )             37.9       06-14    123<L>  |  79<L>  |  21  ----------------------------<  155<H>  4.0   |  20<L>  |  1.36<H>    Ca    6.2<LL>      14 Jun 2017 05:45  Phos  4.4     06-14  Mg     2.0     06-14    TPro  7.0  /  Alb  4.0  /  TBili  3.8<H>  /  DBili  2.4<H>  /  AST  398<H>  /  ALT  492<H>  /  AlkPhos  221<H>  06-14      CARDIAC MARKERS ( 12 Jun 2017 23:45 )  x     / < 0.06 ng/mL / 755 u/L / 5.27 ng/mL / x      CARDIAC MARKERS ( 12 Jun 2017 19:30 )  x     / < 0.06 ng/mL / 806 u/L / 5.25 ng/mL / x      CARDIAC MARKERS ( 12 Jun 2017 03:00 )  x     / < 0.06 ng/mL / 801 u/L / 5.04 ng/mL / x      CARDIAC MARKERS ( 11 Jun 2017 15:20 )  x     / < 0.06 ng/mL / 856 u/L / x     / x            T(C): 36.5, Max: 36.5 (06-14 @ 05:54)  HR: 75 (74 - 80)  BP: 116/74 (114/83 - 128/91)  RR: 18 (18 - 18)  SpO2: 98% (98% - 100%)  Wt(kg): --  no JVD  RRR, no murmurs  Soft nt/nd  no c/c/e  CTAB      A/P) Ms. Carson is a pleasant 61-year-old female with past medical history of hypertension, hyperlipidemia, obesity, history of nonischemic cardiomyopathy with an ejection fraction of about 25% and normal coronaries on a cardiac catheterization performed in 2014.  Despite greater than 9 months of optimal medical therapy she continues to have NYHA Class III CHF and a persistently reduced LVEF. In addition her EKG shows bifascicular block with QRS > 150 ms. She denies syncope or angina    -given the above I recommended a BiV-ICD. I cited a 3% risk of infection or bleeding and a 1% risk of major complication including but not limited to heart attack, stroke, death or need for emergency open heart surgery or pericardiocentesis. Understanding her R/B/A she is refusing BiV-ICD implantation at this time. She fully understands the risks of refusal, including preventible sudden cardiac death or life disabling syncope with injury. I provided my contact information as well as informative literature, and instructed her to contact me if she and her  wish to pursue BiV-ICD implantation.  -I discussed my recommendations with the patient's outpatient cardiologist at [facility], who agrees with my recommendations  -f/u GI regarding increasing LFTs and bilirubin    Dariana Hallman M.D., [facility]  [phone]

## 2017-06-14 NOTE — PROGRESS NOTE ADULT - PROBLEM SELECTOR PROBLEM 4
Essential hypertension
Acute on chronic systolic congestive heart failure
Acute on chronic systolic congestive heart failure
Hypocalcemia
Abdominal pain

## 2017-10-16 NOTE — ED PROVIDER NOTE - OBJECTIVE STATEMENT
61 F h/o DM, CHF, HTN recent admission in May for n/v found to have gall stones but no acute tin, EGD with gastritis, with n/v again x 1 week. This is patients 4th ED visit this week with similar syptoms. No fever/chills. Severe RUQ pain. Unable to tolerate any PO. Statement Selected 61 F h/o DM, CHF, HTN recent admission in May for n/v found to have gall stones but no acute tin, EGD with gastritis, with n/v again x 1 week. This is patients 4th ED visit this week with similar symptoms. No fever/chills. Severe RUQ pain. Unable to tolerate any PO.

## 2018-01-13 ENCOUNTER — EMERGENCY (EMERGENCY)
Facility: HOSPITAL | Age: 63
LOS: 1 days | Discharge: HOME | End: 2018-01-13
Attending: EMERGENCY MEDICINE | Admitting: EMERGENCY MEDICINE
Payer: COMMERCIAL

## 2018-01-13 VITALS
OXYGEN SATURATION: 100 % | RESPIRATION RATE: 18 BRPM | TEMPERATURE: 98 F | SYSTOLIC BLOOD PRESSURE: 123 MMHG | HEART RATE: 89 BPM | DIASTOLIC BLOOD PRESSURE: 80 MMHG

## 2018-01-13 LAB
ALBUMIN SERPL ELPH-MCNC: 4 G/DL — SIGNIFICANT CHANGE UP (ref 3.3–5)
ALP SERPL-CCNC: 204 U/L — HIGH (ref 40–120)
ALT FLD-CCNC: 70 U/L — HIGH (ref 4–33)
APPEARANCE UR: CLEAR — SIGNIFICANT CHANGE UP
AST SERPL-CCNC: 66 U/L — HIGH (ref 4–32)
BASE EXCESS BLDV CALC-SCNC: 6.1 MMOL/L — SIGNIFICANT CHANGE UP
BASOPHILS # BLD AUTO: 0.07 K/UL — SIGNIFICANT CHANGE UP (ref 0–0.2)
BASOPHILS NFR BLD AUTO: 1 % — SIGNIFICANT CHANGE UP (ref 0–2)
BILIRUB SERPL-MCNC: 1.4 MG/DL — HIGH (ref 0.2–1.2)
BILIRUB UR-MCNC: NEGATIVE — SIGNIFICANT CHANGE UP
BLOOD GAS VENOUS - CREATININE: 1.3 MG/DL — SIGNIFICANT CHANGE UP (ref 0.5–1.3)
BLOOD UR QL VISUAL: NEGATIVE — SIGNIFICANT CHANGE UP
BUN SERPL-MCNC: 17 MG/DL — SIGNIFICANT CHANGE UP (ref 7–23)
CALCIUM SERPL-MCNC: 6.1 MG/DL — CRITICAL LOW (ref 8.4–10.5)
CHLORIDE BLDV-SCNC: 94 MMOL/L — LOW (ref 96–108)
CHLORIDE SERPL-SCNC: 92 MMOL/L — LOW (ref 98–107)
CO2 SERPL-SCNC: 29 MMOL/L — SIGNIFICANT CHANGE UP (ref 22–31)
COLOR SPEC: YELLOW — SIGNIFICANT CHANGE UP
CREAT SERPL-MCNC: 1.24 MG/DL — SIGNIFICANT CHANGE UP (ref 0.5–1.3)
EOSINOPHIL # BLD AUTO: 0.06 K/UL — SIGNIFICANT CHANGE UP (ref 0–0.5)
EOSINOPHIL NFR BLD AUTO: 0.8 % — SIGNIFICANT CHANGE UP (ref 0–6)
GAS PNL BLDV: 132 MMOL/L — LOW (ref 136–146)
GLUCOSE BLDV-MCNC: 181 — HIGH (ref 70–99)
GLUCOSE SERPL-MCNC: 169 MG/DL — HIGH (ref 70–99)
GLUCOSE UR-MCNC: NEGATIVE — SIGNIFICANT CHANGE UP
HCO3 BLDV-SCNC: 28 MMOL/L — HIGH (ref 20–27)
HCT VFR BLD CALC: 36.5 % — SIGNIFICANT CHANGE UP (ref 34.5–45)
HCT VFR BLDV CALC: 38.2 % — SIGNIFICANT CHANGE UP (ref 34.5–45)
HGB BLD-MCNC: 11.9 G/DL — SIGNIFICANT CHANGE UP (ref 11.5–15.5)
HGB BLDV-MCNC: 12.4 G/DL — SIGNIFICANT CHANGE UP (ref 11.5–15.5)
IMM GRANULOCYTES # BLD AUTO: 0.04 # — SIGNIFICANT CHANGE UP
IMM GRANULOCYTES NFR BLD AUTO: 0.5 % — SIGNIFICANT CHANGE UP (ref 0–1.5)
KETONES UR-MCNC: NEGATIVE — SIGNIFICANT CHANGE UP
LACTATE BLDV-MCNC: 1.7 MMOL/L — SIGNIFICANT CHANGE UP (ref 0.5–2)
LEUKOCYTE ESTERASE UR-ACNC: NEGATIVE — SIGNIFICANT CHANGE UP
LIDOCAIN IGE QN: 33.5 U/L — SIGNIFICANT CHANGE UP (ref 7–60)
LYMPHOCYTES # BLD AUTO: 1.95 K/UL — SIGNIFICANT CHANGE UP (ref 1–3.3)
LYMPHOCYTES # BLD AUTO: 26.5 % — SIGNIFICANT CHANGE UP (ref 13–44)
MCHC RBC-ENTMCNC: 24.9 PG — LOW (ref 27–34)
MCHC RBC-ENTMCNC: 32.6 % — SIGNIFICANT CHANGE UP (ref 32–36)
MCV RBC AUTO: 76.4 FL — LOW (ref 80–100)
MONOCYTES # BLD AUTO: 0.68 K/UL — SIGNIFICANT CHANGE UP (ref 0–0.9)
MONOCYTES NFR BLD AUTO: 9.3 % — SIGNIFICANT CHANGE UP (ref 2–14)
NEUTROPHILS # BLD AUTO: 4.55 K/UL — SIGNIFICANT CHANGE UP (ref 1.8–7.4)
NEUTROPHILS NFR BLD AUTO: 61.9 % — SIGNIFICANT CHANGE UP (ref 43–77)
NITRITE UR-MCNC: NEGATIVE — SIGNIFICANT CHANGE UP
NRBC # FLD: 0 — SIGNIFICANT CHANGE UP
PCO2 BLDV: 49 MMHG — SIGNIFICANT CHANGE UP (ref 41–51)
PH BLDV: 7.41 PH — SIGNIFICANT CHANGE UP (ref 7.32–7.43)
PH UR: 7.5 — SIGNIFICANT CHANGE UP (ref 5–8)
PLATELET # BLD AUTO: 230 K/UL — SIGNIFICANT CHANGE UP (ref 150–400)
PMV BLD: 11.3 FL — SIGNIFICANT CHANGE UP (ref 7–13)
PO2 BLDV: 28 MMHG — LOW (ref 35–40)
POTASSIUM BLDV-SCNC: 4.4 MMOL/L — SIGNIFICANT CHANGE UP (ref 3.4–4.5)
POTASSIUM SERPL-MCNC: 4.4 MMOL/L — SIGNIFICANT CHANGE UP (ref 3.5–5.3)
POTASSIUM SERPL-SCNC: 4.4 MMOL/L — SIGNIFICANT CHANGE UP (ref 3.5–5.3)
PROT SERPL-MCNC: 7.4 G/DL — SIGNIFICANT CHANGE UP (ref 6–8.3)
PROT UR-MCNC: NEGATIVE MG/DL — SIGNIFICANT CHANGE UP
RBC # BLD: 4.78 M/UL — SIGNIFICANT CHANGE UP (ref 3.8–5.2)
RBC # FLD: 18.2 % — HIGH (ref 10.3–14.5)
RBC CASTS # UR COMP ASSIST: SIGNIFICANT CHANGE UP (ref 0–?)
SAO2 % BLDV: 42.5 % — LOW (ref 60–85)
SODIUM SERPL-SCNC: 135 MMOL/L — SIGNIFICANT CHANGE UP (ref 135–145)
SP GR SPEC: 1.01 — SIGNIFICANT CHANGE UP (ref 1–1.04)
UROBILINOGEN FLD QL: NORMAL MG/DL — SIGNIFICANT CHANGE UP
WBC # BLD: 7.35 K/UL — SIGNIFICANT CHANGE UP (ref 3.8–10.5)
WBC # FLD AUTO: 7.35 K/UL — SIGNIFICANT CHANGE UP (ref 3.8–10.5)
WBC UR QL: SIGNIFICANT CHANGE UP (ref 0–?)

## 2018-01-13 PROCEDURE — 76705 ECHO EXAM OF ABDOMEN: CPT | Mod: 26

## 2018-01-13 PROCEDURE — 99284 EMERGENCY DEPT VISIT MOD MDM: CPT

## 2018-01-13 RX ORDER — CALCIUM GLUCONATE 100 MG/ML
2 VIAL (ML) INTRAVENOUS ONCE
Qty: 0 | Refills: 0 | Status: COMPLETED | OUTPATIENT
Start: 2018-01-13 | End: 2018-01-13

## 2018-01-13 RX ORDER — SODIUM CHLORIDE 9 MG/ML
1000 INJECTION INTRAMUSCULAR; INTRAVENOUS; SUBCUTANEOUS ONCE
Qty: 0 | Refills: 0 | Status: COMPLETED | OUTPATIENT
Start: 2018-01-13 | End: 2018-01-13

## 2018-01-13 RX ORDER — ONDANSETRON 8 MG/1
4 TABLET, FILM COATED ORAL ONCE
Qty: 0 | Refills: 0 | Status: COMPLETED | OUTPATIENT
Start: 2018-01-13 | End: 2018-01-13

## 2018-01-13 RX ADMIN — Medication 200 GRAM(S): at 23:38

## 2018-01-13 RX ADMIN — SODIUM CHLORIDE 2000 MILLILITER(S): 9 INJECTION INTRAMUSCULAR; INTRAVENOUS; SUBCUTANEOUS at 21:29

## 2018-01-13 RX ADMIN — ONDANSETRON 4 MILLIGRAM(S): 8 TABLET, FILM COATED ORAL at 21:29

## 2018-01-13 NOTE — ED PROVIDER NOTE - PROGRESS NOTE DETAILS
emma: PT seen and reassessed.  Patient symptomatically improved.   AAOX3, NAD, VSS.  Discussed test results w/ patient. Patient verbalized understanding of hospital course and outpatient plans, has decisional making capacity.  Will f/u w/ pmd in the next few days; patient will call for an appointment. Will return to the ED if there is any worsening of symptoms.  Patient able to ambulate at baseline, is tolerating PO intake emma: pt s/o to me, has cholelithiasis, ct scan unremarkable, t bili high, but lower than baseline, will dc w/ surg f/u [facility]: Feeling much better, no abd pain. CT no acute pathology. Labs grossly at pt's baseline. Abd soft ntnd. Tolerating solid/liquid, given copy of results, comfortable for dc, outpt pmd/surg/gi f/u.

## 2018-01-13 NOTE — ED ADULT NURSE NOTE - OBJECTIVE STATEMENT
break coverage RN received pt to spot 27 A&ox4 c/o N/V worsening x 1 week immediately after eating, pt reports recent admission @ [facility] for similar complaints unable to report dx. Pt reports epigastric pain a/w vomiting, denies CP or SOB, denies burning on urination or difficulties with BMs, appears otherwise comfortable on assessment, abdomen noted to be soft nontender nondistended. IV placed, labs sent, VS as documented, MD at bedside, will continue to monitor.

## 2018-01-13 NOTE — ED ADULT NURSE NOTE - CHIEF COMPLAINT QUOTE
Pt c/o lower abd pain and vomiting since her discharge from [facility] 1 week ago. Pt states she was at [facility] "for the same thing". Pt also reports upper abd pain after she eats, and a lot of gas and indigestion after vomiting. Pt also states she has AICD which she feels "palpating" from time to time. Denies chest pain/SOB

## 2018-01-13 NOTE — ED ADULT TRIAGE NOTE - CHIEF COMPLAINT QUOTE
Pt c/o lower abd pain and vomiting since her discharge from [facility] 1 week ago. Pt states she was at [facility] "for the same thing". Pt also reports upper abd pain after she eats, and a lot of gas and indigestion after vomiting. Pt also states she has AICD which she feels "palpating" from time to time. Denies chest pain/SOB

## 2018-01-13 NOTE — ED PROVIDER NOTE - OBJECTIVE STATEMENT
62 YOF pmh CHF, AICD with pacemaker presents to ed with abdominal pain and distension, "pt feels gassy" Pt states pain is mainly post prandial and associated with bilious vomiting. Pt denies back pain, denies lower abdominal pain. Pt states abdominal pain is mainly epigastric and right upper quadrant abdominal pain. Pt denies chest pain, denies SOB. Pt had recent 10 day admission d/c last week from [facility]. Pt states she had a workup of her heart, unsure of what happened with her abdomen. Pt states this abdominal pain and distension has been intermittent since november and has become more constant lately. Pt had normal bowel movement this morning.

## 2018-01-13 NOTE — ED ADULT NURSE REASSESSMENT NOTE - CONDITION
rec'd pt in 20b ...c/o nausea...given zofran and ivf....reports nausea decreased..... no vomiting. calcium and ivf given as per order. for re-eval

## 2018-01-13 NOTE — ED PROVIDER NOTE - NS ED ROS FT
CONSTITUTIONAL: No fevers, no chills  Eyes: no visual changes  Ears: no ear drainage, no ear pain  Nose: no nasal congestion  Mouth/Throat: no sore throat  Cardiovascular: No Chest pain  Respiratory: No SOB  Gastrointestinal: +n/v and abdominal pain  Genitourinary: no dysuria, no hematuria  SKIN: no rashes.  NEURO: no headache  PSYCHIATRIC: no known mental health issues.

## 2018-01-13 NOTE — ED PROVIDER NOTE - CARE PLAN
Principal Discharge DX:	Calculus of gallbladder without cholecystitis without obstruction  Secondary Diagnosis:	LFT elevation

## 2018-01-13 NOTE — ED PROVIDER NOTE - ATTENDING CONTRIBUTION TO CARE
[place]: 63 yo female with extensive cardiac history c/o epigastric, RUQ abdominal pain with associated nausea and vomiting. These symptoms typically occur after eating. No diarrhea, fevesr, chills or dysuria. Pt had recent 10 day admission at an outside hospital and had an extensive cardiac workup but felt that no one addressed her abdominal pain. Pt reports that these symptoms have been occurring intermittently x 3 months. denies chest pain and SOB. Exam; +RUQ TTP, negative murphys sign. no guarding, abdomen is soft. cardiac and lung exam unremarkable. 2+ distal pulses x 4 extremities. No Le edema or calf TTp. A/P- 63 yo female with RUQ pain r/o cholecystitis- will obtain labs, RUQ US give antiemetics and pain control and reassess

## 2018-01-14 VITALS
RESPIRATION RATE: 18 BRPM | DIASTOLIC BLOOD PRESSURE: 91 MMHG | HEART RATE: 86 BPM | OXYGEN SATURATION: 100 % | SYSTOLIC BLOOD PRESSURE: 129 MMHG

## 2018-01-14 PROCEDURE — 74177 CT ABD & PELVIS W/CONTRAST: CPT | Mod: 26

## 2018-01-14 RX ORDER — ONDANSETRON 8 MG/1
1 TABLET, FILM COATED ORAL
Qty: 14 | Refills: 0 | OUTPATIENT
Start: 2018-01-14 | End: 2018-01-20

## 2018-01-15 LAB
BACTERIA UR CULT: SIGNIFICANT CHANGE UP
SPECIMEN SOURCE: SIGNIFICANT CHANGE UP

## 2018-01-16 ENCOUNTER — INPATIENT (INPATIENT)
Facility: HOSPITAL | Age: 63
LOS: 3 days | Discharge: HOME | End: 2018-01-20
Attending: INTERNAL MEDICINE | Admitting: INTERNAL MEDICINE
Payer: COMMERCIAL

## 2018-01-16 ENCOUNTER — APPOINTMENT (OUTPATIENT)
Dept: SURGERY | Facility: CLINIC | Age: 63
End: 2018-01-16
Payer: COMMERCIAL

## 2018-01-16 VITALS
HEART RATE: 94 BPM | HEIGHT: 67 IN | WEIGHT: 223 LBS | BODY MASS INDEX: 35 KG/M2 | SYSTOLIC BLOOD PRESSURE: 108 MMHG | DIASTOLIC BLOOD PRESSURE: 80 MMHG | TEMPERATURE: 98.5 F

## 2018-01-16 VITALS
OXYGEN SATURATION: 100 % | SYSTOLIC BLOOD PRESSURE: 134 MMHG | HEART RATE: 84 BPM | RESPIRATION RATE: 10 BRPM | DIASTOLIC BLOOD PRESSURE: 98 MMHG | TEMPERATURE: 98 F

## 2018-01-16 DIAGNOSIS — R10.9 UNSPECIFIED ABDOMINAL PAIN: ICD-10-CM

## 2018-01-16 DIAGNOSIS — Z82.5 FAMILY HISTORY OF ASTHMA AND OTHER CHRONIC LOWER RESPIRATORY DISEASES: ICD-10-CM

## 2018-01-16 DIAGNOSIS — K80.20 CALCULUS OF GALLBLADDER W/OUT CHOLECYSTITIS W/OUT OBSTRUCTION: ICD-10-CM

## 2018-01-16 LAB
ALBUMIN SERPL ELPH-MCNC: 3.7 G/DL — SIGNIFICANT CHANGE UP (ref 3.3–5)
ALP SERPL-CCNC: 238 U/L — HIGH (ref 40–120)
ALT FLD-CCNC: 89 U/L — HIGH (ref 4–33)
APTT BLD: 31.4 SEC — SIGNIFICANT CHANGE UP (ref 27.5–37.4)
AST SERPL-CCNC: 88 U/L — HIGH (ref 4–32)
BASOPHILS # BLD AUTO: 0.09 K/UL — SIGNIFICANT CHANGE UP (ref 0–0.2)
BASOPHILS NFR BLD AUTO: 1.3 % — SIGNIFICANT CHANGE UP (ref 0–2)
BILIRUB SERPL-MCNC: 2.2 MG/DL — HIGH (ref 0.2–1.2)
BLD GP AB SCN SERPL QL: NEGATIVE — SIGNIFICANT CHANGE UP
BUN SERPL-MCNC: 16 MG/DL — SIGNIFICANT CHANGE UP (ref 7–23)
CALCIUM SERPL-MCNC: 5.7 MG/DL — CRITICAL LOW (ref 8.4–10.5)
CHLORIDE SERPL-SCNC: 91 MMOL/L — LOW (ref 98–107)
CO2 SERPL-SCNC: 23 MMOL/L — SIGNIFICANT CHANGE UP (ref 22–31)
CREAT SERPL-MCNC: 1.37 MG/DL — HIGH (ref 0.5–1.3)
EOSINOPHIL # BLD AUTO: 0.06 K/UL — SIGNIFICANT CHANGE UP (ref 0–0.5)
EOSINOPHIL NFR BLD AUTO: 0.9 % — SIGNIFICANT CHANGE UP (ref 0–6)
GLUCOSE SERPL-MCNC: 144 MG/DL — HIGH (ref 70–99)
HBA1C BLD-MCNC: 7 % — HIGH (ref 4–5.6)
HCT VFR BLD CALC: 34.1 % — LOW (ref 34.5–45)
HGB BLD-MCNC: 11.7 G/DL — SIGNIFICANT CHANGE UP (ref 11.5–15.5)
IMM GRANULOCYTES # BLD AUTO: 0.05 # — SIGNIFICANT CHANGE UP
IMM GRANULOCYTES NFR BLD AUTO: 0.7 % — SIGNIFICANT CHANGE UP (ref 0–1.5)
INR BLD: 2.86 — HIGH (ref 0.88–1.17)
LIDOCAIN IGE QN: 31.8 U/L — SIGNIFICANT CHANGE UP (ref 7–60)
LYMPHOCYTES # BLD AUTO: 1.57 K/UL — SIGNIFICANT CHANGE UP (ref 1–3.3)
LYMPHOCYTES # BLD AUTO: 22.3 % — SIGNIFICANT CHANGE UP (ref 13–44)
MCHC RBC-ENTMCNC: 26.4 PG — LOW (ref 27–34)
MCHC RBC-ENTMCNC: 34.3 % — SIGNIFICANT CHANGE UP (ref 32–36)
MCV RBC AUTO: 76.8 FL — LOW (ref 80–100)
MONOCYTES # BLD AUTO: 0.71 K/UL — SIGNIFICANT CHANGE UP (ref 0–0.9)
MONOCYTES NFR BLD AUTO: 10.1 % — SIGNIFICANT CHANGE UP (ref 2–14)
NEUTROPHILS # BLD AUTO: 4.57 K/UL — SIGNIFICANT CHANGE UP (ref 1.8–7.4)
NEUTROPHILS NFR BLD AUTO: 64.7 % — SIGNIFICANT CHANGE UP (ref 43–77)
NRBC # FLD: 0.04 — SIGNIFICANT CHANGE UP
PLATELET # BLD AUTO: 219 K/UL — SIGNIFICANT CHANGE UP (ref 150–400)
PMV BLD: 10.9 FL — SIGNIFICANT CHANGE UP (ref 7–13)
POTASSIUM SERPL-MCNC: 4.3 MMOL/L — SIGNIFICANT CHANGE UP (ref 3.5–5.3)
POTASSIUM SERPL-SCNC: 4.3 MMOL/L — SIGNIFICANT CHANGE UP (ref 3.5–5.3)
PROT SERPL-MCNC: 7 G/DL — SIGNIFICANT CHANGE UP (ref 6–8.3)
PROTHROM AB SERPL-ACNC: 33.6 SEC — HIGH (ref 9.8–13.1)
RBC # BLD: 4.44 M/UL — SIGNIFICANT CHANGE UP (ref 3.8–5.2)
RBC # FLD: 18.8 % — HIGH (ref 10.3–14.5)
RH IG SCN BLD-IMP: POSITIVE — SIGNIFICANT CHANGE UP
SODIUM SERPL-SCNC: 133 MMOL/L — LOW (ref 135–145)
WBC # BLD: 7.05 K/UL — SIGNIFICANT CHANGE UP (ref 3.8–10.5)
WBC # FLD AUTO: 7.05 K/UL — SIGNIFICANT CHANGE UP (ref 3.8–10.5)

## 2018-01-16 PROCEDURE — 99202 OFFICE O/P NEW SF 15 MIN: CPT

## 2018-01-16 PROCEDURE — 99223 1ST HOSP IP/OBS HIGH 75: CPT

## 2018-01-16 RX ORDER — CALCIUM GLUCONATE 100 MG/ML
2 VIAL (ML) INTRAVENOUS ONCE
Qty: 0 | Refills: 0 | Status: COMPLETED | OUTPATIENT
Start: 2018-01-16 | End: 2018-01-16

## 2018-01-16 RX ORDER — METOCLOPRAMIDE HCL 10 MG
10 TABLET ORAL EVERY 6 HOURS
Qty: 0 | Refills: 0 | Status: DISCONTINUED | OUTPATIENT
Start: 2018-01-16 | End: 2018-01-17

## 2018-01-16 RX ORDER — ONDANSETRON 8 MG/1
4 TABLET, FILM COATED ORAL ONCE
Qty: 0 | Refills: 0 | Status: COMPLETED | OUTPATIENT
Start: 2018-01-16 | End: 2018-01-16

## 2018-01-16 RX ORDER — ONDANSETRON 8 MG/1
4 TABLET, FILM COATED ORAL EVERY 6 HOURS
Qty: 0 | Refills: 0 | Status: DISCONTINUED | OUTPATIENT
Start: 2018-01-16 | End: 2018-01-20

## 2018-01-16 RX ADMIN — Medication 200 GRAM(S): at 20:46

## 2018-01-16 RX ADMIN — ONDANSETRON 4 MILLIGRAM(S): 8 TABLET, FILM COATED ORAL at 20:38

## 2018-01-16 NOTE — CONSULT NOTE ADULT - SUBJECTIVE AND OBJECTIVE BOX
61yo F with PMHx CHF (AICD/PPM), DM, HTN, thyroid ca s/p thyroidectomy, now presents with persistent upper abdominal pain a/w n/v. She has been having these symptoms for the better part of a year. She was admitted last  with similar symptoms and was found to have a contracted gallbladder with stones but no signs/symptoms of acute cholecystitis; she was being worked up by GI and cardiology for possible cardiac etiology causing hepatic congestion but the pt left AMA to get seen at [place] by "her doctors." She more recently presented to [facility] ED last Saturday () again with abd pain and n/v; US and CT both showed cholelithiasis with no signs of acute cholecystitis; d/c'd home with GI/surg f/u. She saw Dr. Smalls today in the office and he sent her to the ED for further work-up.    Currently, her main complaints are upper abdominal pain that is difficult to localize. She also complains of poor PO tolerance with nausea/vomiting.    PMH  Asthma  CHF (congestive heart failure)  DM (diabetes mellitus)  HTN (hypertension)  Thyroid ca    PSH  S/P thyroidectomy   x1 (1990s)    Allergies  penicillin (Rash)      Physical Exam  T(C): 36.4 (18 @ 16:25), Max: 36.4 (18 @ 16:25)  HR: 84 (18 @ 16:25) (84 - 84)  BP: 134/98 (18 @ 16:25) (134/98 - 134/98)  RR: 10 (18 @ 16:25) (10 - 10)  SpO2: 100% (18 @ 16:25) (100% - 100%)  Wt(kg): --  Tmax: T(C): , Max: 36.4 (18 @ 16:25)  Wt(kg): --    Gen: NAD  Abd: Soft, obese, mildly tender though not focally tender in any area, no rebound, no guarding, no palpable organomegaly/masses  Ext: warm, no edema    Labs:                        11.7   7.05  )-----------( 219      ( 2018 18:41 )             34.1       PT/INR - ( 2018 18:41 )   PT: 33.6 SEC;   INR: 2.86       PTT - ( 2018 18:41 )  PTT:31.4 SEC      Imaging    < from: CT Abdomen and Pelvis w/ IV Cont (18 @ 01:57) >  IMPRESSION: Contracted gallbladder with intraluminal stones, unchanged.    < end of copied text >    < from: US Abdomen Limited (18 @ 18:58) >  IMPRESSION:     Cholelithiasis. No sonographic evidence of acute cholecystitis.    < end of copied text > 61yo F with PMHx CHF (AICD/PPM), DM, HTN, thyroid ca s/p thyroidectomy, now presents with persistent upper abdominal pain a/w n/v. She has been having these symptoms for the better part of a year. She was admitted last  with similar symptoms and was found to have a contracted gallbladder with stones but no signs/symptoms of acute cholecystitis; she was being worked up by GI and cardiology for possible cardiac etiology causing hepatic congestion but the pt left AMA to get seen at [place] by "her doctors." She more recently presented to [facility] ED last Saturday () again with abd pain and n/v; US and CT both showed cholelithiasis with no signs of acute cholecystitis; d/c'd home with GI/surg f/u. She saw Dr. Smalls today in the office and he sent her to the ED for further work-up.    Currently, her main complaints are upper abdominal pain that is difficult to localize. She also complains of poor PO tolerance with nausea/vomiting.    PMH  Asthma  CHF (congestive heart failure)  DM (diabetes mellitus)  HTN (hypertension)  Thyroid ca    PSH  S/P thyroidectomy   x1 (1990s)    Allergies  penicillin (Rash)      Physical Exam  T(C): 36.4 (18 @ 16:25), Max: 36.4 (18 @ 16:25)  HR: 84 (18 @ 16:25) (84 - 84)  BP: 134/98 (18 @ 16:25) (134/98 - 134/98)  RR: 10 (18 @ 16:25) (10 - 10)  SpO2: 100% (18 @ 16:25) (100% - 100%)  Wt(kg): --  Tmax: T(C): , Max: 36.4 (18 @ 16:25)  Wt(kg): --    Gen: NAD  Abd: Soft, obese, mildly tender though not focally tender in any area, no rebound, no guarding, no palpable organomegaly/masses  Ext: warm, no edema    Labs:             CBC Full  -  ( 2018 18:41 )  WBC Count : 7.05 K/uL  Hemoglobin : 11.7 g/dL  Hematocrit : 34.1 %  Platelet Count - Automated : 219 K/uL  Mean Cell Volume : 76.8 fL  Mean Cell Hemoglobin : 26.4 pg  Mean Cell Hemoglobin Concentration : 34.3 %  Auto Neutrophil # : 4.57 K/uL  Auto Lymphocyte # : 1.57 K/uL  Auto Monocyte # : 0.71 K/uL  Auto Eosinophil # : 0.06 K/uL  Auto Basophil # : 0.09 K/uL  Auto Neutrophil % : 64.7 %  Auto Lymphocyte % : 22.3 %  Auto Monocyte % : 10.1 %  Auto Eosinophil % : 0.9 %  Auto Basophil % : 1.3 %          133<L>  |  91<L>  |  16  ----------------------------<  144<H>  4.3   |  23  |  1.37<H>      TPro  7.0  /  Alb  3.7  /  TBili  2.2<H>  /  DBili  x   /  AST  88<H>  /  ALT  89<H>  /  AlkPhos  238<H>        Imaging    < from: CT Abdomen and Pelvis w/ IV Cont (18 @ 01:57) >  IMPRESSION: Contracted gallbladder with intraluminal stones, unchanged.    < end of copied text >    < from: US Abdomen Limited (18 @ 18:58) >  IMPRESSION:     Cholelithiasis. No sonographic evidence of acute cholecystitis.    < end of copied text >

## 2018-01-16 NOTE — H&P ADULT - NSHPPHYSICALEXAM_GEN_ALL_CORE
T(C): 36.4 (01-16-18 @ 16:25), Max: 36.4 (01-16-18 @ 16:25)  HR: 84 (01-16-18 @ 16:25) (84 - 84)  BP: 134/98 (01-16-18 @ 16:25) (134/98 - 134/98)  RR: 10 (01-16-18 @ 16:25) (10 - 10)  SpO2: 100% (01-16-18 @ 16:25) (100% - 100%)    GENERAL: No acute distress, obese  HEAD:  Atraumatic, Normocephalic  ENT: EOMI, PERRLA, conjunctiva and sclera clear, Neck supple, No JVD, moist mucosa, no pharyngeal erythema, no tonsillar enlargement or exudate  CHEST/LUNG: Clear to auscultation bilaterally; No wheeze, equal breath sounds bilaterally   HEART: Regular rate and rhythm; No murmurs, rubs, or gallops  ABDOMEN: Soft, Slightly tender in RUQ, Nondistended; Bowel sounds present, no organomegaly  EXTREMITIES:  2+ Peripheral Pulses, No clubbing, cyanosis, 1+ edema  PSYCH: AAOx3, normal affect   NEUROLOGY: non-focal, cranial nerves intact  SKIN: Normal color, No rashes or lesions

## 2018-01-16 NOTE — ED PROVIDER NOTE - PHYSICAL EXAMINATION
General: uncomfortable appearing female   Respiratory: normal work of breathing, lungs clear to auscultation bilaterally   Cardiac: regular rate and rhythm   Abdomen: soft, mild RUQ tenderness to palpation, no CVA tenderness, no guarding or rebound   MSK: no swelling or tenderness of lower extremities General: obese, nad  Respiratory: normal work of breathing, lungs clear to auscultation bilaterally   Cardiac: regular rate and rhythm   Abdomen: soft, nontender, no CVA tenderness, no guarding or rebound   MSK: no swelling or tenderness of lower extremities

## 2018-01-16 NOTE — ED PROVIDER NOTE - MEDICAL DECISION MAKING DETAILS
62F, PMH of DM, CHF with ICD sent in by surgeon for admission for cholecystitis. patient recently had abdominal ultrasound and CT. will consult surgery. plan for cbc, cmp, pt/inr. will reassess need for repeat imaging. 62F, PMH of DM, CHF with ICD sent in by surgeon for admission for medical optimization and cholecystectomy. patient recently had abdominal ultrasound and CT. will consult surgery. plan for cbc, cmp, pt/inr.

## 2018-01-16 NOTE — H&P ADULT - HISTORY OF PRESENT ILLNESS
63 yo F with h/o DM, HTN, HFrEF s/p ICD p/w abd pain x 1 week. Her abd pain is 10/10, intermittent, diffuse, crampy, non radiating, worse after eating and sometimes relieved after BMs. She has had associated NBNB vomiting, often provoked by coughing spells. She initially was constipated but started having diarrhea yesterday- no blood/melena was noted. She has not had any fevers or chills, no change in diet, no sick contacts, no recent travel. Of note, pt was recently hospitalized at [facility] 3 weeks ago- went for similar symptoms. During hospitalization she had cardiac cath done and was on milrinone drip for about 2 days. Since discharge she has not noticed any increase in weight, LE edema is stable, no SOB, no orthopnea or PND. She has had a dry cough.    ED VS:  134/ 98  84  97.5  10  100% on RA  Pt was given calcium gluconate 2g IV for hypocalcemia and zofran 4mg IV

## 2018-01-16 NOTE — ED ADULT TRIAGE NOTE - CHIEF COMPLAINT QUOTE
Pt diagnosed w cholecystitis 3 days ago (here at [facility]).  Seen by Surg MD and sent back for admission.  ****see md note.   Pt c/o generalized abd pain.   h/o ICD, DM, CHF

## 2018-01-16 NOTE — ED PROVIDER NOTE - ATTENDING CONTRIBUTION TO CARE
61 y/o present with from surgeons office for admission. Patient has had several months of intermittent upper abd pain. Worsened last week, b/l upper abdomen, worsens with eating. Has had extensive GI and cardiac eval both at [facility] (ama'd before gi w/u was complete in june), cardiac w/u at [place]-with possibility symptoms may be 2/2 complications from chf. Patient was evaluated in ed few days ago for the abd pain, had us and ct showing gallstones, was told to f/u with surgeon. Today saw dr. parham who told her to go to [place] to be admitted for cholecystectomy. Patient elected to come to [facility] instead. Denies fevers, chills, ha, cp, sob, vomiting, diarrhea, dysuria. Notes nausea and upper abd pain with eating.   exam  GEN - obese, appears comfortable; A+O x3   HEAD - NC/AT   EYES- PERRL, EOMI  ENT: Airway patent, mmm, Oral cavity and pharynx normal. No inflammation, swelling, exudate, or lesions.  NECK: Neck supple, non-tender without lymphadenopathy, no masses.  PULMONARY - CTA b/l, symmetric breath sounds.   CARDIAC -s1s2, RRR, no M,G,R  ABDOMEN - +BS, ND, NT, soft, no guarding, no rebound, no masses   BACK - no CVA tenderness, Normal  spine   EXTREMITIES - FROM, symmetric pulses, capillary refill < 2 seconds, no edema   SKIN - no rash or bruising   NEUROLOGIC - alert, speech clear, no focal deficits  PSYCH -nl mood/affect, nl insight.  a/p-61 y/o f sent in surgeon for admission (went to hospital surgeon does not come to), no abd ttp, nontoxic appearing, vss, will consult surgery, repeat labs, pain ctrl prn, monitor, reass.

## 2018-01-16 NOTE — H&P ADULT - PROBLEM SELECTOR PLAN 2
- Appears to be compensated at this time- resp status stable and minimal LE edema  - Recent admit at [place] for CHF exac?- though pt denies any resp symptoms at the time  - Will need to obtain records from [place]- Dr Chavarria  - Pt reports most recent EF of 20%  - Continue home meds - coreg, lisinopril and torsemide  - Daily weight, I/Os

## 2018-01-16 NOTE — ED PROVIDER NOTE - OBJECTIVE STATEMENT
62F, PMH of asthma, DM, CHF with ICD presenting for admission for cholecystitis. Patient was seen in the ED three days ago for similar symptoms, CT was negative for SBO, ultrasound showed gallstones. Patient pain was treated and patient was able to tolerate PO, was discharged home with surgery follow-up. Since then the patient reports her pain has returned and is worse with eating and has some vomiting. Patient was seen today by her surgeon Dr. David Smalls who sent the patient in for admission for cholecystitis. Also complaining of palpitations. Denies any chest pain, difficulty breathing, dysuria, pain or swelling in lower extremities. 62F, PMH of asthma, DM, CHF with ICD presenting for admission for medical optimization and cholecystectomy. Patient was seen in the ED three days ago for similar symptoms, CT was negative for SBO, ultrasound showed gallstones. Patient pain was treated and patient was able to tolerate PO, was discharged home with surgery follow-up. Since then the patient reports her pain has returned and is worse with eating and has some nausea. Patient was seen today by her surgeon Dr. David Smalls who sent the patient in for admission for. Denies any chest pain, difficulty breathing, dysuria, pain or swelling in lower extremities.

## 2018-01-16 NOTE — H&P ADULT - NSHPLABSRESULTS_GEN_ALL_CORE
.  LABS:                         11.7   7.05  )-----------( 219      ( 16 Jan 2018 18:41 )             34.1     01-16    133<L>  |  91<L>  |  16  ----------------------------<  144<H>  4.3   |  23  |  1.37<H>    Ca    5.7<LL>      16 Jan 2018 18:41    TPro  7.0  /  Alb  3.7  /  TBili  2.2<H>  /  DBili  x   /  AST  88<H>  /  ALT  89<H>  /  AlkPhos  238<H>  01-16    PT/INR - ( 16 Jan 2018 18:41 )   PT: 33.6 SEC;   INR: 2.86          PTT - ( 16 Jan 2018 18:41 )  PTT:31.4 SEC              RADIOLOGY, EKG & ADDITIONAL TESTS: Reviewed.

## 2018-01-16 NOTE — ED ADULT NURSE NOTE - OBJECTIVE STATEMENT
63 y/o female, awake, A&O x 3, NAD, presents to ED complaining of abdominal pain.  Seen in the ED 3 days for similar symptoms.  Returned to ED because she not able to tolerate PO intake.  Complaining of nausea and vomiting.  History of DM, CHF, ICD and Asthma.  Denies chest pain, SOB or dizziness at this time.  Patient examined by MD.

## 2018-01-16 NOTE — H&P ADULT - NSHPREVIEWOFSYSTEMS_GEN_ALL_CORE
REVIEW OF SYSTEMS:    CONSTITUTIONAL: No weakness, fevers or chills, no weight loss  EYES/ENT: No visual changes;  No dysphagia or odynophagia, no tinnitus  NECK: No pain or stiffness  RESPIRATORY: + cough, no wheezing, no hemoptysis; No shortness of breath  CARDIOVASCULAR: No chest pain or palpitations; No lower extremity edema  GASTROINTESTINAL: + abdominal pain. + nausea and vomiting, no hematemesis; + diarrhea, no constipation. No melena or hematochezia.  MUSCULOSKELETAL: No joint pain, swelling, erythema or warmth, no back pain  GENITOURINARY: No dysuria, frequency or hematuria, no suprapubic pain  NEUROLOGICAL: No numbness or weakness, no headache, no syncope, no gait abnormalities   SKIN: No itching, burning, rashes, or lesions   All other review of systems is negative unless indicated above.

## 2018-01-16 NOTE — ED PROVIDER NOTE - PROGRESS NOTE DETAILS
Admitted to medicine as donna attending [facility].  Surgery does not think this is acute tin, believes all 2/2 chf.    [facility] GI will consult on patient as well as nighat Arango  regarding admission (  admission MD requested by GI).  DONNA MAR admission.

## 2018-01-16 NOTE — ED ADULT NURSE NOTE - CHIEF COMPLAINT QUOTE
Pt diagnosed w cholecystitis 3 days ago (here at [facility]).  Seen by Surg MD and sent back for admission.  ****see md note.   Pt c/o generalized abd pain.   h/o ICD, DM, CHF

## 2018-01-17 DIAGNOSIS — I10 ESSENTIAL (PRIMARY) HYPERTENSION: ICD-10-CM

## 2018-01-17 DIAGNOSIS — I50.22 CHRONIC SYSTOLIC (CONGESTIVE) HEART FAILURE: ICD-10-CM

## 2018-01-17 DIAGNOSIS — R11.2 NAUSEA WITH VOMITING, UNSPECIFIED: ICD-10-CM

## 2018-01-17 DIAGNOSIS — R74.0 NONSPECIFIC ELEVATION OF LEVELS OF TRANSAMINASE AND LACTIC ACID DEHYDROGENASE [LDH]: ICD-10-CM

## 2018-01-17 DIAGNOSIS — R10.84 GENERALIZED ABDOMINAL PAIN: ICD-10-CM

## 2018-01-17 DIAGNOSIS — E03.9 HYPOTHYROIDISM, UNSPECIFIED: ICD-10-CM

## 2018-01-17 DIAGNOSIS — E11.9 TYPE 2 DIABETES MELLITUS WITHOUT COMPLICATIONS: ICD-10-CM

## 2018-01-17 LAB
ALBUMIN SERPL ELPH-MCNC: 3.6 G/DL — SIGNIFICANT CHANGE UP (ref 3.3–5)
ALP SERPL-CCNC: 229 U/L — HIGH (ref 40–120)
ALT FLD-CCNC: 87 U/L — HIGH (ref 4–33)
AST SERPL-CCNC: 83 U/L — HIGH (ref 4–32)
BASOPHILS # BLD AUTO: 0.1 K/UL — SIGNIFICANT CHANGE UP (ref 0–0.2)
BASOPHILS NFR BLD AUTO: 1.5 % — SIGNIFICANT CHANGE UP (ref 0–2)
BILIRUB SERPL-MCNC: 1.8 MG/DL — HIGH (ref 0.2–1.2)
BUN SERPL-MCNC: 18 MG/DL — SIGNIFICANT CHANGE UP (ref 7–23)
CA-I BLD-SCNC: 0.67 MMOL/L — CRITICAL LOW (ref 1.03–1.23)
CALCIUM SERPL-MCNC: 6 MG/DL — CRITICAL LOW (ref 8.4–10.5)
CHLORIDE SERPL-SCNC: 91 MMOL/L — LOW (ref 98–107)
CO2 SERPL-SCNC: 26 MMOL/L — SIGNIFICANT CHANGE UP (ref 22–31)
CREAT SERPL-MCNC: 1.48 MG/DL — HIGH (ref 0.5–1.3)
EOSINOPHIL # BLD AUTO: 0.1 K/UL — SIGNIFICANT CHANGE UP (ref 0–0.5)
EOSINOPHIL NFR BLD AUTO: 1.5 % — SIGNIFICANT CHANGE UP (ref 0–6)
GLUCOSE BLDC GLUCOMTR-MCNC: 119 MG/DL — HIGH (ref 70–99)
GLUCOSE BLDC GLUCOMTR-MCNC: 129 MG/DL — HIGH (ref 70–99)
GLUCOSE BLDC GLUCOMTR-MCNC: 133 MG/DL — HIGH (ref 70–99)
GLUCOSE BLDC GLUCOMTR-MCNC: 174 MG/DL — HIGH (ref 70–99)
GLUCOSE SERPL-MCNC: 128 MG/DL — HIGH (ref 70–99)
HCT VFR BLD CALC: 35.9 % — SIGNIFICANT CHANGE UP (ref 34.5–45)
HGB BLD-MCNC: 11.7 G/DL — SIGNIFICANT CHANGE UP (ref 11.5–15.5)
IMM GRANULOCYTES # BLD AUTO: 0.03 # — SIGNIFICANT CHANGE UP
IMM GRANULOCYTES NFR BLD AUTO: 0.5 % — SIGNIFICANT CHANGE UP (ref 0–1.5)
LYMPHOCYTES # BLD AUTO: 1.41 K/UL — SIGNIFICANT CHANGE UP (ref 1–3.3)
LYMPHOCYTES # BLD AUTO: 21.2 % — SIGNIFICANT CHANGE UP (ref 13–44)
MAGNESIUM SERPL-MCNC: 1.4 MG/DL — LOW (ref 1.6–2.6)
MCHC RBC-ENTMCNC: 24.9 PG — LOW (ref 27–34)
MCHC RBC-ENTMCNC: 32.6 % — SIGNIFICANT CHANGE UP (ref 32–36)
MCV RBC AUTO: 76.5 FL — LOW (ref 80–100)
MONOCYTES # BLD AUTO: 0.58 K/UL — SIGNIFICANT CHANGE UP (ref 0–0.9)
MONOCYTES NFR BLD AUTO: 8.7 % — SIGNIFICANT CHANGE UP (ref 2–14)
NEUTROPHILS # BLD AUTO: 4.43 K/UL — SIGNIFICANT CHANGE UP (ref 1.8–7.4)
NEUTROPHILS NFR BLD AUTO: 66.6 % — SIGNIFICANT CHANGE UP (ref 43–77)
NRBC # FLD: 0.02 — SIGNIFICANT CHANGE UP
PHOSPHATE SERPL-MCNC: 5.8 MG/DL — HIGH (ref 2.5–4.5)
PLATELET # BLD AUTO: 264 K/UL — SIGNIFICANT CHANGE UP (ref 150–400)
PMV BLD: 11.3 FL — SIGNIFICANT CHANGE UP (ref 7–13)
POTASSIUM SERPL-MCNC: 3.9 MMOL/L — SIGNIFICANT CHANGE UP (ref 3.5–5.3)
POTASSIUM SERPL-SCNC: 3.9 MMOL/L — SIGNIFICANT CHANGE UP (ref 3.5–5.3)
PROT SERPL-MCNC: 6.8 G/DL — SIGNIFICANT CHANGE UP (ref 6–8.3)
RBC # BLD: 4.69 M/UL — SIGNIFICANT CHANGE UP (ref 3.8–5.2)
RBC # FLD: 18.7 % — HIGH (ref 10.3–14.5)
SODIUM SERPL-SCNC: 134 MMOL/L — LOW (ref 135–145)
WBC # BLD: 6.65 K/UL — SIGNIFICANT CHANGE UP (ref 3.8–10.5)
WBC # FLD AUTO: 6.65 K/UL — SIGNIFICANT CHANGE UP (ref 3.8–10.5)

## 2018-01-17 RX ORDER — DEXTROSE 50 % IN WATER 50 %
12.5 SYRINGE (ML) INTRAVENOUS ONCE
Qty: 0 | Refills: 0 | Status: DISCONTINUED | OUTPATIENT
Start: 2018-01-17 | End: 2018-01-20

## 2018-01-17 RX ORDER — BUMETANIDE 0.25 MG/ML
1 INJECTION INTRAMUSCULAR; INTRAVENOUS EVERY 12 HOURS
Qty: 0 | Refills: 0 | Status: DISCONTINUED | OUTPATIENT
Start: 2018-01-17 | End: 2018-01-17

## 2018-01-17 RX ORDER — SIMETHICONE 80 MG/1
80 TABLET, CHEWABLE ORAL
Qty: 0 | Refills: 0 | Status: DISCONTINUED | OUTPATIENT
Start: 2018-01-17 | End: 2018-01-17

## 2018-01-17 RX ORDER — ASPIRIN/CALCIUM CARB/MAGNESIUM 324 MG
81 TABLET ORAL DAILY
Qty: 0 | Refills: 0 | Status: DISCONTINUED | OUTPATIENT
Start: 2018-01-17 | End: 2018-01-20

## 2018-01-17 RX ORDER — BUMETANIDE 0.25 MG/ML
1 INJECTION INTRAMUSCULAR; INTRAVENOUS
Qty: 0 | Refills: 0 | Status: DISCONTINUED | OUTPATIENT
Start: 2018-01-17 | End: 2018-01-18

## 2018-01-17 RX ORDER — INFLUENZA VIRUS VACCINE 15; 15; 15; 15 UG/.5ML; UG/.5ML; UG/.5ML; UG/.5ML
0.5 SUSPENSION INTRAMUSCULAR ONCE
Qty: 0 | Refills: 0 | Status: COMPLETED | OUTPATIENT
Start: 2018-01-17 | End: 2018-01-17

## 2018-01-17 RX ORDER — SODIUM CHLORIDE 9 MG/ML
1000 INJECTION, SOLUTION INTRAVENOUS
Qty: 0 | Refills: 0 | Status: DISCONTINUED | OUTPATIENT
Start: 2018-01-17 | End: 2018-01-20

## 2018-01-17 RX ORDER — CARVEDILOL PHOSPHATE 80 MG/1
3.12 CAPSULE, EXTENDED RELEASE ORAL EVERY 12 HOURS
Qty: 0 | Refills: 0 | Status: DISCONTINUED | OUTPATIENT
Start: 2018-01-17 | End: 2018-01-17

## 2018-01-17 RX ORDER — DEXTROSE 50 % IN WATER 50 %
25 SYRINGE (ML) INTRAVENOUS ONCE
Qty: 0 | Refills: 0 | Status: DISCONTINUED | OUTPATIENT
Start: 2018-01-17 | End: 2018-01-20

## 2018-01-17 RX ORDER — CALCITRIOL 0.5 UG/1
0.5 CAPSULE ORAL DAILY
Qty: 0 | Refills: 0 | Status: DISCONTINUED | OUTPATIENT
Start: 2018-01-17 | End: 2018-01-20

## 2018-01-17 RX ORDER — CARVEDILOL PHOSPHATE 80 MG/1
3.12 CAPSULE, EXTENDED RELEASE ORAL EVERY 12 HOURS
Qty: 0 | Refills: 0 | Status: DISCONTINUED | OUTPATIENT
Start: 2018-01-17 | End: 2018-01-20

## 2018-01-17 RX ORDER — CALCIUM GLUCONATE 100 MG/ML
1 VIAL (ML) INTRAVENOUS ONCE
Qty: 0 | Refills: 0 | Status: COMPLETED | OUTPATIENT
Start: 2018-01-17 | End: 2018-01-17

## 2018-01-17 RX ORDER — PANTOPRAZOLE SODIUM 20 MG/1
40 TABLET, DELAYED RELEASE ORAL
Qty: 0 | Refills: 0 | Status: DISCONTINUED | OUTPATIENT
Start: 2018-01-17 | End: 2018-01-20

## 2018-01-17 RX ORDER — INSULIN LISPRO 100/ML
VIAL (ML) SUBCUTANEOUS
Qty: 0 | Refills: 0 | Status: DISCONTINUED | OUTPATIENT
Start: 2018-01-17 | End: 2018-01-20

## 2018-01-17 RX ORDER — HEPARIN SODIUM 5000 [USP'U]/ML
5000 INJECTION INTRAVENOUS; SUBCUTANEOUS EVERY 12 HOURS
Qty: 0 | Refills: 0 | Status: DISCONTINUED | OUTPATIENT
Start: 2018-01-17 | End: 2018-01-20

## 2018-01-17 RX ORDER — CALCIUM GLUCONATE 100 MG/ML
2 VIAL (ML) INTRAVENOUS ONCE
Qty: 0 | Refills: 0 | Status: COMPLETED | OUTPATIENT
Start: 2018-01-18 | End: 2018-01-18

## 2018-01-17 RX ORDER — SIMETHICONE 80 MG/1
80 TABLET, CHEWABLE ORAL EVERY 6 HOURS
Qty: 0 | Refills: 0 | Status: DISCONTINUED | OUTPATIENT
Start: 2018-01-17 | End: 2018-01-20

## 2018-01-17 RX ORDER — LEVOTHYROXINE SODIUM 125 MCG
150 TABLET ORAL DAILY
Qty: 0 | Refills: 0 | Status: DISCONTINUED | OUTPATIENT
Start: 2018-01-17 | End: 2018-01-20

## 2018-01-17 RX ORDER — DEXTROSE 50 % IN WATER 50 %
1 SYRINGE (ML) INTRAVENOUS ONCE
Qty: 0 | Refills: 0 | Status: DISCONTINUED | OUTPATIENT
Start: 2018-01-17 | End: 2018-01-20

## 2018-01-17 RX ORDER — CALCIUM ACETATE 667 MG
667 TABLET ORAL
Qty: 0 | Refills: 0 | Status: DISCONTINUED | OUTPATIENT
Start: 2018-01-17 | End: 2018-01-20

## 2018-01-17 RX ORDER — CALCIUM GLUCONATE 100 MG/ML
2 VIAL (ML) INTRAVENOUS ONCE
Qty: 0 | Refills: 0 | Status: DISCONTINUED | OUTPATIENT
Start: 2018-01-17 | End: 2018-01-17

## 2018-01-17 RX ORDER — MAGNESIUM OXIDE 400 MG ORAL TABLET 241.3 MG
400 TABLET ORAL
Qty: 0 | Refills: 0 | Status: DISCONTINUED | OUTPATIENT
Start: 2018-01-17 | End: 2018-01-20

## 2018-01-17 RX ORDER — BUMETANIDE 0.25 MG/ML
1 INJECTION INTRAMUSCULAR; INTRAVENOUS DAILY
Qty: 0 | Refills: 0 | Status: DISCONTINUED | OUTPATIENT
Start: 2018-01-17 | End: 2018-01-17

## 2018-01-17 RX ORDER — GLUCAGON INJECTION, SOLUTION 0.5 MG/.1ML
1 INJECTION, SOLUTION SUBCUTANEOUS ONCE
Qty: 0 | Refills: 0 | Status: DISCONTINUED | OUTPATIENT
Start: 2018-01-17 | End: 2018-01-20

## 2018-01-17 RX ORDER — LISINOPRIL 2.5 MG/1
10 TABLET ORAL DAILY
Qty: 0 | Refills: 0 | Status: DISCONTINUED | OUTPATIENT
Start: 2018-01-17 | End: 2018-01-19

## 2018-01-17 RX ORDER — LISINOPRIL 2.5 MG/1
10 TABLET ORAL DAILY
Qty: 0 | Refills: 0 | Status: DISCONTINUED | OUTPATIENT
Start: 2018-01-17 | End: 2018-01-17

## 2018-01-17 RX ADMIN — Medication 200 GRAM(S): at 10:23

## 2018-01-17 RX ADMIN — Medication 81 MILLIGRAM(S): at 11:47

## 2018-01-17 RX ADMIN — Medication 100 MILLIGRAM(S): at 07:45

## 2018-01-17 RX ADMIN — Medication 100 MILLIGRAM(S): at 21:23

## 2018-01-17 RX ADMIN — Medication 150 MICROGRAM(S): at 06:36

## 2018-01-17 RX ADMIN — LISINOPRIL 10 MILLIGRAM(S): 2.5 TABLET ORAL at 11:47

## 2018-01-17 RX ADMIN — Medication 20 MILLIGRAM(S): at 11:47

## 2018-01-17 RX ADMIN — CALCITRIOL 0.5 MICROGRAM(S): 0.5 CAPSULE ORAL at 11:46

## 2018-01-17 RX ADMIN — CARVEDILOL PHOSPHATE 3.12 MILLIGRAM(S): 80 CAPSULE, EXTENDED RELEASE ORAL at 17:35

## 2018-01-17 RX ADMIN — Medication 100 MILLIGRAM(S): at 14:31

## 2018-01-17 RX ADMIN — PANTOPRAZOLE SODIUM 40 MILLIGRAM(S): 20 TABLET, DELAYED RELEASE ORAL at 07:45

## 2018-01-17 NOTE — CONSULT NOTE ADULT - PROBLEM SELECTOR RECOMMENDATION 4
- avoid hepatotoxins   - elevated lft's/bili & abdominal pain likely 2/2 RHF  - US Abd pending to assess gallbladder and CBD

## 2018-01-17 NOTE — CONSULT NOTE ADULT - PROBLEM SELECTOR RECOMMENDATION 2
- zofran prn   - diet as tolerated  - avoid reglan and opioids for possible gastric emptying study vs UGI series - zofran prn   - diet as tolerated  - avoid reglan and opioids for gastric emptying study

## 2018-01-17 NOTE — CONSULT NOTE ADULT - SUBJECTIVE AND OBJECTIVE BOX
Chief Complaint:  Patient is a 62y old  Female who presents with a chief complaint of Abdominal pain (16 Jan 2018 23:50)    Asthma  CHF (congestive heart failure)  DM (diabetes mellitus)  HTN (hypertension)  Thyroid ca  S/P thyroidectomy     HPI:  61yo F h/o DM2 (A1C 7.5 5/17), CHFrEF,  thyroid CA s/p thyroidectomy c/b persistent hypocalcemia, hypertension, hyperlipidemia, chronic RBBB,  obesity, thyroid CA, history of nonischemic cardiomyopathy HFrEF s/p ICD, EGD 5/2017 with esophagitis, gastritis and duodenopathy p/w abd pain x 1 week. Her abd pain is 10/10, intermittent, diffuse, crampy, non radiating, worse after eating and sometimes relieved after BMs. She has had associated NBNB vomiting, often provoked by coughing spells. She initially was constipated but started having diarrhea yesterday- no blood/melena was noted. She has not had any fevers or chills, no change in diet, no sick contacts, no recent travel. Of note, pt was recently hospitalized at [facility] 3 weeks ago- went for similar symptoms. During hospitalization she had cardiac cath done and was on milrinone drip for about 2 days. Since discharge she has not noticed any increase in weight, LE edema is stable, no SOB, no orthopnea or PND. She has had a dry cough. Pt reports that her abdominal pain is is diffuse and usually comes on with coughing and increased phlegm which is what she is "vomiting" up     ED VS:  134/ 98  84  97.5  10  100% on RA  Pt was given calcium gluconate 2g IV for hypocalcemia and zofran 4mg IV (16 Jan 2018 23:50)      penicillin (Rash)      aluminum hydroxide/magnesium hydroxide/simethicone Suspension 30 milliLiter(s) Oral every 4 hours PRN  aspirin enteric coated 81 milliGRAM(s) Oral daily  benzonatate 100 milliGRAM(s) Oral every 8 hours  calcitriol   Capsule 0.5 MICROGram(s) Oral daily  carvedilol 3.125 milliGRAM(s) Oral every 12 hours  dextrose 5%. 1000 milliLiter(s) IV Continuous <Continuous>  dextrose 50% Injectable 12.5 Gram(s) IV Push once  dextrose 50% Injectable 25 Gram(s) IV Push once  dextrose 50% Injectable 25 Gram(s) IV Push once  dextrose Gel 1 Dose(s) Oral once PRN  glucagon  Injectable 1 milliGRAM(s) IntraMuscular once PRN  influenza   Vaccine 0.5 milliLiter(s) IntraMuscular once  insulin lispro (HumaLOG) corrective regimen sliding scale   SubCutaneous three times a day before meals  levothyroxine 150 MICROGram(s) Oral daily  lisinopril 10 milliGRAM(s) Oral daily  metoclopramide Injectable 10 milliGRAM(s) IV Push every 6 hours PRN  ondansetron Injectable 4 milliGRAM(s) IV Push every 6 hours PRN  pantoprazole    Tablet 40 milliGRAM(s) Oral before breakfast  simethicone 80 milliGRAM(s) Chew four times a day PRN  torsemide 20 milliGRAM(s) Oral every other day        FAMILY HISTORY:  No pertinent family history in first degree relatives        Review of Systems:    General:  No wt loss, fevers, chills, night sweats, fatigue  Eyes:  Good vision, no reported pain  ENT:  No sore throat, pain, runny nose, dysphagia  CV:  No pain, palpitations, no lightheadedness  Resp:  No dyspnea, cough, tachypnea, wheezing  GI: as above  :  No pain, bleeding, incontinence, nocturia  Muscle:  No pain, weakness  Neuro:  No weakness, tingling, memory problems  Psych:  No fatigue, insomnia, mood problems, depression  Endocrine:  No polyuria, polydypsia, cold/heat intolerance  Heme:  No petechiae, ecchymosis, easy bruisability  Skin:  No rash, tattoos, scars, edema    Relevant Family History:   n/c    Relevant Social History: n/c      Physical Exam:    Vital Signs:  Vital Signs Last 24 Hrs  T(C): 37.1 (17 Jan 2018 10:37), Max: 37.1 (17 Jan 2018 10:37)  T(F): 98.8 (17 Jan 2018 10:37), Max: 98.8 (17 Jan 2018 10:37)  HR: 82 (17 Jan 2018 10:37) (62 - 84)  BP: 111/84 (17 Jan 2018 10:37) (100/65 - 134/98)  BP(mean): --  RR: 16 (17 Jan 2018 10:37) (10 - 16)  SpO2: 100% (17 Jan 2018 10:37) (98% - 100%)  Daily Height in cm: 170.18 (17 Jan 2018 12:20)    Daily     General:  Appears stated age, well-groomed, nad  HEENT:  NC/AT,  conjunctivae clear and pink, no thyromegaly, nodules, adenopathy, no JVD  Chest:  Full & symmetric excursion, no increased effort, breath sounds clear  Cardiovascular:  Regular rhythm, S1, S2, no murmur/rub/S3/S4, no abdominal bruit, no edema  Abdomen:  Soft, non-tender, non-distended, normoactive bowel sounds,  no masses ,no hepatosplenomeagaly, no signs of chronic liver disease  Extremities:  no cyanosis,clubbing or edema  Skin:  No rash/erythema/ecchymoses/petechiae/wounds/abscess/warm/dry  Neuro/Psych:  A&Ox3  , no asterixis, no tremor, no encephalopathy    Laboratory:                            11.7   6.65  )-----------( 264      ( 17 Jan 2018 06:12 )             35.9     01-17    134<L>  |  91<L>  |  18  ----------------------------<  128<H>  3.9   |  26  |  1.48<H>    Ca    6.0<LL>      17 Jan 2018 06:12  Phos  5.8     01-17  Mg     1.4     01-17    TPro  6.8  /  Alb  3.6  /  TBili  1.8<H>  /  DBili  x   /  AST  83<H>  /  ALT  87<H>  /  AlkPhos  229<H>  01-17    LIVER FUNCTIONS - ( 17 Jan 2018 06:12 )  Alb: 3.6 g/dL / Pro: 6.8 g/dL / ALK PHOS: 229 u/L / ALT: 87 u/L / AST: 83 u/L / GGT: x           PT/INR - ( 16 Jan 2018 18:41 )   PT: 33.6 SEC;   INR: 2.86          PTT - ( 16 Jan 2018 18:41 )  PTT:31.4 SEC    Amylase Serum--      Lipase serum31.8       Ammonia--    Imaging:      < from: Upper Endoscopy (05.17.17 @ 16:28) >    [facility]  _______________________________________________________________________________  Patient Name: Pearl Carson           Procedure Date: 5/17/2017 4:28 PM  MRN: [id number]                     Account Number: [id number]  YOB: 1955             Admit Type: Inpatient  Room: [place] [address]                         Gender: Female  Attending MD: LAZARO PLAZA MD        _______________________________________________________________________________     Procedure:           Upper GI endoscopy  Indications:         Generalized abdominal pain  Providers:           LAZARO PLAZA MD  Medicines:           Monitored Anesthesia Care  Complications:       No immediate complications.  Procedure:           Pre-Anesthesia Assessment:                       - Prior to the procedure, a History and Physical was                        performed, and patient medications, allergies and                        sensitivities were reviewed. The patient's tolerance of                   previous anesthesia was reviewed.                       After obtaining informed consent, the endoscope was                        passed under direct vision. Throughout the procedure,                        the patient's blood pressure, pulse, and oxygen                        saturations were monitored continuously. The was                        introduced through the mouth, and advanced to the second                        part of duodenum.                                               Findings:       LA Grade B (one or more mucosal breaks greater than 5 mm, not extending        between the tops of two mucosal folds) esophagitis with no bleeding was        found.       Diffuse moderate inflammation was found in the gastric body and in the        gastric antrum. Biopsies were taken with a cold forceps for histology.        There was evidence of bile reflux.       Moderately erythematous mucosa without active bleeding and with no    stigmata of bleeding was found in the duodenal bulb.                                                                                   Impression:          - LA Grade B reflux esophagitis.                       - Acute gastritis. Biopsied.              - Erythematous duodenopathy.  Recommendation:      - Await pathology results.                                                                                   Attending Participation:       I personally performed the entire procedure.                                                                                   _________________  LAZARO PLAZA MD  5/17/2017 5:00:15 PM  Number of Addenda: 0    Note Initiated On: 5/17/2017 4:28 PM    < end of copied text >    Surgical Pathology Report (05.17.17 @ 17:00)    Surgical Pathology Report:   ACCESSION No:  [id number]    PEARL CARSON                        1        Surgical Final Report          Final Diagnosis    1. Stomach, antrum, biopsy  - Mild chronic gastritis  - No H. pylori seen on Warthin-Starry stain    SP DAWSON MD  (Electronic Signature)  Reported on: 05/18/17    Clinical History  Abdominal pain, gastritis, EGD    Specimen(s) Submitted  1- Antral bx    Gross Description  05/17/17 21:14  The specimen is received in formalin and the specimen container  is labeled: Antral biopsy.  It consists of two tissue fragments  measuring 0.2 cm and 0.3 cm in greatest dimension.  H. pylori  staining has been ordered.   Entirely submitted.  One cassette.    In addition to other data that may appear on the specimen  container, the label has been inspected to confirm the presence  of the patient's name and date of birth.  TK05/17/17 21:16

## 2018-01-17 NOTE — CHART NOTE - NSCHARTNOTEFT_GEN_A_CORE
Pt was seen and examined.  Briefly this is a young female c NICM HTN DM likely hypoparathyroidism pw hepatitis that may be hepatic congestion from decompensated heart failure  CKD stage 3  Hyponatremia-Hypervolemic    Suggest  -IV Bumex and zaroxoyln  -IV calcium gluconate and start Phoslo as well  -Mag oxide bid  -Check BNP  -May benefit from heart failure consult            Sayed Yaneth  [facility]  [phone]

## 2018-01-18 LAB
ALBUMIN SERPL ELPH-MCNC: 3.5 G/DL — SIGNIFICANT CHANGE UP (ref 3.3–5)
ALP SERPL-CCNC: 219 U/L — HIGH (ref 40–120)
ALT FLD-CCNC: 94 U/L — HIGH (ref 4–33)
AST SERPL-CCNC: 97 U/L — HIGH (ref 4–32)
BILIRUB DIRECT SERPL-MCNC: 0.9 MG/DL — HIGH (ref 0.1–0.2)
BILIRUB SERPL-MCNC: 1.4 MG/DL — HIGH (ref 0.2–1.2)
BUN SERPL-MCNC: 23 MG/DL — SIGNIFICANT CHANGE UP (ref 7–23)
CALCIUM SERPL-MCNC: 6.3 MG/DL — CRITICAL LOW (ref 8.4–10.5)
CHLORIDE SERPL-SCNC: 94 MMOL/L — LOW (ref 98–107)
CO2 SERPL-SCNC: 26 MMOL/L — SIGNIFICANT CHANGE UP (ref 22–31)
CREAT SERPL-MCNC: 1.44 MG/DL — HIGH (ref 0.5–1.3)
GLUCOSE BLDC GLUCOMTR-MCNC: 116 MG/DL — HIGH (ref 70–99)
GLUCOSE BLDC GLUCOMTR-MCNC: 118 MG/DL — HIGH (ref 70–99)
GLUCOSE BLDC GLUCOMTR-MCNC: 125 MG/DL — HIGH (ref 70–99)
GLUCOSE BLDC GLUCOMTR-MCNC: 153 MG/DL — HIGH (ref 70–99)
GLUCOSE SERPL-MCNC: 125 MG/DL — HIGH (ref 70–99)
HCT VFR BLD CALC: 34.3 % — LOW (ref 34.5–45)
HGB BLD-MCNC: 11.4 G/DL — LOW (ref 11.5–15.5)
MCHC RBC-ENTMCNC: 25.4 PG — LOW (ref 27–34)
MCHC RBC-ENTMCNC: 33.2 % — SIGNIFICANT CHANGE UP (ref 32–36)
MCV RBC AUTO: 76.6 FL — LOW (ref 80–100)
NRBC # FLD: 0 — SIGNIFICANT CHANGE UP
PLATELET # BLD AUTO: 249 K/UL — SIGNIFICANT CHANGE UP (ref 150–400)
PMV BLD: 10.8 FL — SIGNIFICANT CHANGE UP (ref 7–13)
POTASSIUM SERPL-MCNC: 4 MMOL/L — SIGNIFICANT CHANGE UP (ref 3.5–5.3)
POTASSIUM SERPL-SCNC: 4 MMOL/L — SIGNIFICANT CHANGE UP (ref 3.5–5.3)
PROT SERPL-MCNC: 5.9 G/DL — LOW (ref 6–8.3)
RBC # BLD: 4.48 M/UL — SIGNIFICANT CHANGE UP (ref 3.8–5.2)
RBC # FLD: 19.2 % — HIGH (ref 10.3–14.5)
SODIUM SERPL-SCNC: 137 MMOL/L — SIGNIFICANT CHANGE UP (ref 135–145)
T4 FREE SERPL-MCNC: 1.66 NG/DL — SIGNIFICANT CHANGE UP (ref 0.9–1.8)
TSH SERPL-MCNC: 1.4 UIU/ML — SIGNIFICANT CHANGE UP (ref 0.27–4.2)
WBC # BLD: 6.5 K/UL — SIGNIFICANT CHANGE UP (ref 3.8–10.5)
WBC # FLD AUTO: 6.5 K/UL — SIGNIFICANT CHANGE UP (ref 3.8–10.5)

## 2018-01-18 PROCEDURE — 78264 GASTRIC EMPTYING IMG STUDY: CPT | Mod: 26

## 2018-01-18 RX ORDER — BUMETANIDE 0.25 MG/ML
1 INJECTION INTRAMUSCULAR; INTRAVENOUS
Qty: 0 | Refills: 0 | Status: DISCONTINUED | OUTPATIENT
Start: 2018-01-18 | End: 2018-01-19

## 2018-01-18 RX ORDER — CALCIUM GLUCONATE 100 MG/ML
2 VIAL (ML) INTRAVENOUS ONCE
Qty: 0 | Refills: 0 | Status: COMPLETED | OUTPATIENT
Start: 2018-01-18 | End: 2018-01-18

## 2018-01-18 RX ADMIN — CALCITRIOL 0.5 MICROGRAM(S): 0.5 CAPSULE ORAL at 15:30

## 2018-01-18 RX ADMIN — MAGNESIUM OXIDE 400 MG ORAL TABLET 400 MILLIGRAM(S): 241.3 TABLET ORAL at 17:04

## 2018-01-18 RX ADMIN — BUMETANIDE 1 MILLIGRAM(S): 0.25 INJECTION INTRAMUSCULAR; INTRAVENOUS at 17:03

## 2018-01-18 RX ADMIN — Medication 100 MILLIGRAM(S): at 21:05

## 2018-01-18 RX ADMIN — Medication 81 MILLIGRAM(S): at 15:30

## 2018-01-18 RX ADMIN — Medication 200 GRAM(S): at 18:38

## 2018-01-18 RX ADMIN — CARVEDILOL PHOSPHATE 3.12 MILLIGRAM(S): 80 CAPSULE, EXTENDED RELEASE ORAL at 17:03

## 2018-01-18 RX ADMIN — Medication 667 MILLIGRAM(S): at 17:03

## 2018-01-18 RX ADMIN — Medication 200 GRAM(S): at 01:16

## 2018-01-18 RX ADMIN — Medication 100 MILLIGRAM(S): at 15:30

## 2018-01-18 NOTE — PROGRESS NOTE ADULT - PROBLEM SELECTOR PLAN 2
- zofran prn   - diet as tolerated  - avoid reglan and opioids for gastric emptying study -- > pending  - low fat diet after GES

## 2018-01-18 NOTE — CONSULT NOTE ADULT - SUBJECTIVE AND OBJECTIVE BOX
HISTORY OF PRESENT ILLNESS:  Cardiology ([facility])- Dr Gurmeet Chavarria [phone]  63 yo F with h/o DM, HTN, HFrEF s/p ICD p/w abd pain x 1 week. Her abd pain is 10/10, intermittent, diffuse, crampy, non radiating, worse after eating and sometimes relieved after BMs. She has had associated NBNB vomiting, often provoked by coughing spells. She initially was constipated but started having diarrhea yesterday- no blood/melena was noted. She has not had any fevers or chills, no change in diet, no sick contacts, no recent travel. Of note, pt was recently hospitalized at [facility] 3 weeks ago- went for similar symptoms. During hospitalization she had cardiac cath done and was on milrinone drip for about 2 days. Since discharge she has not noticed any increase in weight, LE edema is stable, no SOB, no orthopnea or PND. She has had a dry cough.      PAST MEDICAL & SURGICAL HISTORY:  Asthma  CHF (congestive heart failure): hypothyroid  DM (diabetes mellitus)  HTN (hypertension)  Thyroid ca  S/P thyroidectomy        PREVIOUS DIAGNOSTIC TESTING:    [ ] Echocardiogram:   < from: TTE with Doppler (w/Cont) (05.16.17 @ 09:19) >  CONCLUSIONS:  1. Mitral annular calcification, otherwise normal mitral  valve. Moderate mitral regurgitation.  2. Moderately dilated left atrium.  LA volume index = 46  cc/m2.  3. Severe left ventricular enlargement.  4. Severe global left ventricular systolic dysfunction.  Endocardial visualization enhanced with intravenous  injection of echo contrast (Definity).  5. Normal right ventricular size with decreased right  ventricular systolic function.  6. Estimated right ventricular systolic pressure equals 53  mm Hg, assuming right atrial pressure equals 10 mm Hg,  consistent with moderate pulmonary hypertension.  *** Compared with echocardiogram of 5/22/2014, no  < end of copied text >      [ ]  Catheterization:  < from: Cardiac Cath Lab (01.08.14 @ 16:49) >  VENTRICLES: No LV gram was performed; however, a recent echocardiogram  demonstrated an EF of 25 %.  CORONARY VESSELS: The coronary circulation is co-dominant.  LM:   -- LM: Normal.  LAD:   --  LAD: Normal.  CX:   --  Circumflex: Normal.  RCA:   --  RCA: Normal.  COMPLICATIONS: There were no complications.  SUMMARY:  HEMODYNAMICS: Hemodynamic assessment demonstrates no systemic hypertension,  severely elevated LVEDP, borderline low cardiac output, and markedly  elevated pulmonary capillary wedge pressure.  DIAGNOSTIC RECOMMENDATIONS: The patient's diuretic regimen should be  carefully increased. Patient management should include aggressive medical  therapy. The patient should follow a low fat and low calorie diet. Medical  management is recommended.  Prepared and signed by  Anni Leos M.D.    < end of copied text >    [ ] Stress Test:  	    MEDICATIONS:  aspirin enteric coated 81 milliGRAM(s) Oral daily  buMETAnide Injectable 1 milliGRAM(s) IV Push two times a day  carvedilol 3.125 milliGRAM(s) Oral every 12 hours  heparin  Injectable 5000 Unit(s) SubCutaneous every 12 hours  lisinopril 10 milliGRAM(s) Oral daily  metolazone 5 milliGRAM(s) Oral daily      benzonatate 100 milliGRAM(s) Oral every 8 hours    ondansetron Injectable 4 milliGRAM(s) IV Push every 6 hours PRN    aluminum hydroxide/magnesium hydroxide/simethicone Suspension 30 milliLiter(s) Oral every 4 hours PRN  pantoprazole    Tablet 40 milliGRAM(s) Oral before breakfast  simethicone 80 milliGRAM(s) Chew every 6 hours    dextrose 50% Injectable 12.5 Gram(s) IV Push once  dextrose 50% Injectable 25 Gram(s) IV Push once  dextrose 50% Injectable 25 Gram(s) IV Push once  dextrose Gel 1 Dose(s) Oral once PRN  glucagon  Injectable 1 milliGRAM(s) IntraMuscular once PRN  insulin lispro (HumaLOG) corrective regimen sliding scale   SubCutaneous three times a day before meals  levothyroxine 150 MICROGram(s) Oral daily    calcitriol   Capsule 0.5 MICROGram(s) Oral daily  calcium acetate 667 milliGRAM(s) Oral three times a day with meals  calcium gluconate IVPB 2 Gram(s) IV Intermittent once  dextrose 5%. 1000 milliLiter(s) IV Continuous <Continuous>  magnesium oxide 400 milliGRAM(s) Oral three times a day with meals      Allergies    penicillin (Rash)    Intolerances        FAMILY HISTORY:  No pertinent family history in first degree relatives      SOCIAL HISTORY:    [x ] Non-smoker  [ ] Former Smoker  [ ] Current Smoker  [ ] Alcohol      REVIEW OF SYSTEMS:  [ ]chest pain  [  ]shortness of breath  [  ]palpitations  [  ]syncope  [ ]near syncope [  ]diplopia  [  ]altered mental status   [  ]fevers  [ ]chills [x ]nausea PTA   [ ] vomiting  [x ]abdominal pain PTA [ ]melena  [ ]BRBPR  [  ]epistaxis  [  ]rash  [  ]lower extremity edema  [x] cough        [x ] All others negative	  [ ] Unable to obtain    PHYSICAL EXAM:  T(C): 36.6 (01-18-18 @ 15:28), Max: 36.6 (01-18-18 @ 15:28)  HR: 83 (01-18-18 @ 16:58) (69 - 83)  BP: 113/65 (01-18-18 @ 16:58) (99/62 - 113/90)  RR: 18 (01-18-18 @ 16:58) (17 - 18)  SpO2: 100% (01-18-18 @ 16:58) (99% - 100%)  Wt(kg): --  I&O's Summary    17 Jan 2018 07:01  -  18 Jan 2018 07:00  --------------------------------------------------------  IN: 100 mL / OUT: 0 mL / NET: 100 mL        Appearance: No Signs of acute distress  HEENT:   Normal oral mucosa, PERRL, EOMI	  Lymphatic: No lymphadenopathy  Cardiovascular: Normal S1 S2, No JVD, No murmurs, No edema  Respiratory: Lungs clear to auscultation	  Gastrointestinal:  Soft, Non-tender, + BS		  Extremities:  No clubbing, cyanosis or edema  Vascular: Peripheral pulses palpable 2+ bilaterally    TELEMETRY: 	  Paced   ECG:  	< from: 12 Lead ECG (01.13.18 @ 15:45) >  Diagnosis Line Atrial-sensed ventricular-paced rhythm  Abnormal ECG    < end of copied text >    RADIOLOGY:  Gastric emptying    < from: NM Gastric Empty Solid/Liquid (01.18.18 @ 14:40) >  IMPRESSION: Normal liquid and solid phase gastric emptying study.       No evidence of gastroparesis.      < end of copied text >      OTHER: 	  	  LABS:	 	    CARDIAC MARKERS:                                  11.4   6.50  )-----------( 249      ( 18 Jan 2018 05:30 )             34.3     01-18    137  |  94<L>  |  23  ----------------------------<  125<H>  4.0   |  26  |  1.44<H>    Ca    6.3<LL>      18 Jan 2018 05:30  Phos  5.8     01-17  Mg     1.4     01-17    TPro  5.9<L>  /  Alb  3.5  /  TBili  1.4<H>  /  DBili  0.9<H>  /  AST  97<H>  /  ALT  94<H>  /  AlkPhos  219<H>  01-18    proBNP:   Lipid Profile:   HgA1c:   TSH: Thyroid Stimulating Hormone, Serum: 1.40 uIU/mL (01-18 @ 05:30)      ASSESSMENT/PLAN: Cardiology ([facility])- Dr Gurmeet Chavarria [phone]  63 yo F with h/o DM, HTN, HFrEF s/p ICD p/w abd pain x 1 week. Her abd pain is 10/10, intermittent, diffuse, crampy, non radiating, worse after eating and sometimes relieved after BMs. She has had associated NBNB vomiting, often provoked by coughing spells. She initially was constipated but started having diarrhea - no blood/melena was noted. She has not had any fevers or chills, no change in diet, no sick contacts, no recent travel. Of note, pt was recently hospitalized at [facility] 3 weeks ago- went for similar symptoms. During hospitalization she had cardiac cath done and was on milrinone drip for about 2 days. Since discharge she has not noticed any increase in weight, LE edema is stable, no SOB, no orthopnea or PND. She has had a dry cough.    pt reports no abdominal pain no chest pain no SOB no LE edema   s/p Gastric Emptying Study w/o gastroparesis   CHF   c/w Bumex ASA Lisinopril Coreg, Zaroxolyn   Obtain records from Dr Chavarria

## 2018-01-18 NOTE — PROGRESS NOTE ADULT - PROBLEM SELECTOR PLAN 4
avoid hepatotoxins   - elevated lft's/bili & abdominal pain likely d/t Hepatic Congestion 2/2 RHF  - patient refused US Abdomen

## 2018-01-19 DIAGNOSIS — I50.23 ACUTE ON CHRONIC SYSTOLIC (CONGESTIVE) HEART FAILURE: ICD-10-CM

## 2018-01-19 LAB
ALBUMIN SERPL ELPH-MCNC: 3.7 G/DL — SIGNIFICANT CHANGE UP (ref 3.3–5)
ALP SERPL-CCNC: 236 U/L — HIGH (ref 40–120)
ALT FLD-CCNC: 119 U/L — HIGH (ref 4–33)
AST SERPL-CCNC: 114 U/L — HIGH (ref 4–32)
BILIRUB SERPL-MCNC: 1.3 MG/DL — HIGH (ref 0.2–1.2)
BUN SERPL-MCNC: 21 MG/DL — SIGNIFICANT CHANGE UP (ref 7–23)
CALCIUM SERPL-MCNC: 6.1 MG/DL — CRITICAL LOW (ref 8.4–10.5)
CHLORIDE SERPL-SCNC: 94 MMOL/L — LOW (ref 98–107)
CO2 SERPL-SCNC: 25 MMOL/L — SIGNIFICANT CHANGE UP (ref 22–31)
CREAT SERPL-MCNC: 1.22 MG/DL — SIGNIFICANT CHANGE UP (ref 0.5–1.3)
GLUCOSE BLDC GLUCOMTR-MCNC: 122 MG/DL — HIGH (ref 70–99)
GLUCOSE BLDC GLUCOMTR-MCNC: 142 MG/DL — HIGH (ref 70–99)
GLUCOSE BLDC GLUCOMTR-MCNC: 143 MG/DL — HIGH (ref 70–99)
GLUCOSE BLDC GLUCOMTR-MCNC: 172 MG/DL — HIGH (ref 70–99)
GLUCOSE SERPL-MCNC: 140 MG/DL — HIGH (ref 70–99)
HCT VFR BLD CALC: 36.9 % — SIGNIFICANT CHANGE UP (ref 34.5–45)
HGB BLD-MCNC: 12.4 G/DL — SIGNIFICANT CHANGE UP (ref 11.5–15.5)
MAGNESIUM SERPL-MCNC: 1.4 MG/DL — LOW (ref 1.6–2.6)
MCHC RBC-ENTMCNC: 26.4 PG — LOW (ref 27–34)
MCHC RBC-ENTMCNC: 33.6 % — SIGNIFICANT CHANGE UP (ref 32–36)
MCV RBC AUTO: 78.5 FL — LOW (ref 80–100)
NRBC # FLD: 0 — SIGNIFICANT CHANGE UP
NT-PROBNP SERPL-SCNC: 2489 PG/ML — SIGNIFICANT CHANGE UP
PLATELET # BLD AUTO: 263 K/UL — SIGNIFICANT CHANGE UP (ref 150–400)
PMV BLD: 10.9 FL — SIGNIFICANT CHANGE UP (ref 7–13)
POTASSIUM SERPL-MCNC: 3.9 MMOL/L — SIGNIFICANT CHANGE UP (ref 3.5–5.3)
POTASSIUM SERPL-SCNC: 3.9 MMOL/L — SIGNIFICANT CHANGE UP (ref 3.5–5.3)
PROT SERPL-MCNC: 7.1 G/DL — SIGNIFICANT CHANGE UP (ref 6–8.3)
RBC # BLD: 4.7 M/UL — SIGNIFICANT CHANGE UP (ref 3.8–5.2)
RBC # FLD: 19.7 % — HIGH (ref 10.3–14.5)
SODIUM SERPL-SCNC: 137 MMOL/L — SIGNIFICANT CHANGE UP (ref 135–145)
WBC # BLD: 6.34 K/UL — SIGNIFICANT CHANGE UP (ref 3.8–10.5)
WBC # FLD AUTO: 6.34 K/UL — SIGNIFICANT CHANGE UP (ref 3.8–10.5)

## 2018-01-19 PROCEDURE — 99223 1ST HOSP IP/OBS HIGH 75: CPT

## 2018-01-19 RX ORDER — CALCIUM GLUCONATE 100 MG/ML
2 VIAL (ML) INTRAVENOUS ONCE
Qty: 0 | Refills: 0 | Status: COMPLETED | OUTPATIENT
Start: 2018-01-19 | End: 2018-01-19

## 2018-01-19 RX ORDER — VALSARTAN 80 MG/1
40 TABLET ORAL
Qty: 0 | Refills: 0 | Status: DISCONTINUED | OUTPATIENT
Start: 2018-01-19 | End: 2018-01-20

## 2018-01-19 RX ORDER — SPIRONOLACTONE 25 MG/1
12.5 TABLET, FILM COATED ORAL DAILY
Qty: 0 | Refills: 0 | Status: DISCONTINUED | OUTPATIENT
Start: 2018-01-19 | End: 2018-01-19

## 2018-01-19 RX ORDER — FUROSEMIDE 40 MG
20 TABLET ORAL
Qty: 500 | Refills: 0 | Status: DISCONTINUED | OUTPATIENT
Start: 2018-01-19 | End: 2018-01-20

## 2018-01-19 RX ORDER — FUROSEMIDE 40 MG
10 TABLET ORAL
Qty: 500 | Refills: 0 | Status: DISCONTINUED | OUTPATIENT
Start: 2018-01-19 | End: 2018-01-19

## 2018-01-19 RX ORDER — SPIRONOLACTONE 25 MG/1
25 TABLET, FILM COATED ORAL
Qty: 0 | Refills: 0 | Status: DISCONTINUED | OUTPATIENT
Start: 2018-01-19 | End: 2018-01-20

## 2018-01-19 RX ORDER — VALSARTAN 80 MG/1
40 TABLET ORAL
Qty: 0 | Refills: 0 | Status: DISCONTINUED | OUTPATIENT
Start: 2018-01-19 | End: 2018-01-19

## 2018-01-19 RX ORDER — MAGNESIUM SULFATE 500 MG/ML
2 VIAL (ML) INJECTION ONCE
Qty: 0 | Refills: 0 | Status: COMPLETED | OUTPATIENT
Start: 2018-01-19 | End: 2018-01-19

## 2018-01-19 RX ORDER — BUMETANIDE 0.25 MG/ML
1 INJECTION INTRAMUSCULAR; INTRAVENOUS ONCE
Qty: 0 | Refills: 0 | Status: COMPLETED | OUTPATIENT
Start: 2018-01-19 | End: 2018-01-19

## 2018-01-19 RX ADMIN — MAGNESIUM OXIDE 400 MG ORAL TABLET 400 MILLIGRAM(S): 241.3 TABLET ORAL at 09:35

## 2018-01-19 RX ADMIN — SIMETHICONE 80 MILLIGRAM(S): 80 TABLET, CHEWABLE ORAL at 05:26

## 2018-01-19 RX ADMIN — Medication 150 MICROGRAM(S): at 05:25

## 2018-01-19 RX ADMIN — SPIRONOLACTONE 25 MILLIGRAM(S): 25 TABLET, FILM COATED ORAL at 18:23

## 2018-01-19 RX ADMIN — VALSARTAN 40 MILLIGRAM(S): 80 TABLET ORAL at 22:40

## 2018-01-19 RX ADMIN — Medication 81 MILLIGRAM(S): at 18:24

## 2018-01-19 RX ADMIN — BUMETANIDE 1 MILLIGRAM(S): 0.25 INJECTION INTRAMUSCULAR; INTRAVENOUS at 18:22

## 2018-01-19 RX ADMIN — Medication 667 MILLIGRAM(S): at 18:23

## 2018-01-19 RX ADMIN — Medication 100 MILLIGRAM(S): at 05:25

## 2018-01-19 RX ADMIN — SIMETHICONE 80 MILLIGRAM(S): 80 TABLET, CHEWABLE ORAL at 13:37

## 2018-01-19 RX ADMIN — Medication 100 MILLIGRAM(S): at 22:39

## 2018-01-19 RX ADMIN — Medication 667 MILLIGRAM(S): at 09:35

## 2018-01-19 RX ADMIN — BUMETANIDE 1 MILLIGRAM(S): 0.25 INJECTION INTRAMUSCULAR; INTRAVENOUS at 05:25

## 2018-01-19 RX ADMIN — CALCITRIOL 0.5 MICROGRAM(S): 0.5 CAPSULE ORAL at 18:23

## 2018-01-19 RX ADMIN — Medication 200 GRAM(S): at 13:38

## 2018-01-19 RX ADMIN — Medication 50 GRAM(S): at 09:48

## 2018-01-19 RX ADMIN — LISINOPRIL 10 MILLIGRAM(S): 2.5 TABLET ORAL at 05:26

## 2018-01-19 RX ADMIN — PANTOPRAZOLE SODIUM 40 MILLIGRAM(S): 20 TABLET, DELAYED RELEASE ORAL at 05:26

## 2018-01-19 RX ADMIN — CARVEDILOL PHOSPHATE 3.12 MILLIGRAM(S): 80 CAPSULE, EXTENDED RELEASE ORAL at 05:26

## 2018-01-19 RX ADMIN — MAGNESIUM OXIDE 400 MG ORAL TABLET 400 MILLIGRAM(S): 241.3 TABLET ORAL at 18:24

## 2018-01-19 RX ADMIN — Medication 200 GRAM(S): at 10:46

## 2018-01-19 RX ADMIN — MAGNESIUM OXIDE 400 MG ORAL TABLET 400 MILLIGRAM(S): 241.3 TABLET ORAL at 13:37

## 2018-01-19 RX ADMIN — Medication 667 MILLIGRAM(S): at 13:37

## 2018-01-19 RX ADMIN — CARVEDILOL PHOSPHATE 3.12 MILLIGRAM(S): 80 CAPSULE, EXTENDED RELEASE ORAL at 18:24

## 2018-01-19 RX ADMIN — Medication 200 GRAM(S): at 22:41

## 2018-01-19 NOTE — PROVIDER CONTACT NOTE (OTHER) - SITUATION
Pt refusing Furosemide Infusion because she does not want to have to use the bathroom throughout the night and wants to sleep. HF notified.

## 2018-01-19 NOTE — PROVIDER CONTACT NOTE (OTHER) - ACTION/TREATMENT ORDERED:
Explain to patient the risks of refusing Furosemide infusion when in ADHF. Document patient refusal.

## 2018-01-19 NOTE — CONSULT NOTE ADULT - ATTENDING COMMENTS
Agree with above.   -Obtain outpatient cardiac records  -Check TTE  -Further workup pending above    Kunal Muller MD  [facility]  [address], [id number]  [place] [address]  office: [phone]  pager: [phone]
This is a pleasant, 63 yo F with longstanding NICM, LVEF 22%, reduced RV function, severe MR, hypertension, DM II, and s/p CRT-D. Of note, she is s/p thyroidectomy for cancer and has surgical hypo-parathyroidism with chronically reduced calcium. She has had multiple hospitalizations for R>L HF (abdominal symptoms and told she needs GB surgery), including 3 weeks ago at [facility]. She reports that they put her on milrinone and that gave her a severe HA. Her outpatient medications included Coreg 3.125 BID and Lisinopril 10 mg daily. Last 2017, she had a RHC here:    RA 11, PA 47/24/36, PCW 24, CO/CI (F) 3.5/1.6, PA 59%, Ao 99%, PVR 3.4 Franco, SVR 1966 dsc. /83/97.    PE notable for JVP 18-20 cm H2O, +HJR, clear lungs, obese abdomen, and 1+ BLE edema. I had a lengthy conversation with the patient about the importance of removing the excess volume completely to reduce the likelihood of readmission. I have recommended the followin. After her next dose of bumex 1 mg IV, please start a lasix infusion of 20 mg/hr (double concentrated) for maximal diuresis.  2. Add spironolactone 25 mg po BID to prevent hypokalemia. If morning K > 4.5, ok to hold afternoon dose of spironolactone.  3. Start valsartan 40 mg po BID and increase to 80 mg po BID if SBP > 100 on Saturday. Similarly, if SBP > 100 on , please further increase to 120 mg po BID on .  4. Please do not give metolazone at this time.  5. Dr. Wolfe will closely monitor her calcium and treat, as needed.    The [facility] HF attending on call is available for any urgent questions. My colleague, Dr. Mckeon, will be here on Monday to follow-up. This is a young patient with severe cardiomyopathy and I have recommended that she follow-up with me in the office after discharge.

## 2018-01-19 NOTE — CONSULT NOTE ADULT - PROBLEM SELECTOR RECOMMENDATION 9
-Abodominal pain and elevated LVTs concerning for hepatic congestion secondary to ADHF.   -Pt has had RHC which shows low flow in past.   -She has moderately elevated JVP and 1+ LE edema.   - -Abodominal pain and elevated LVTs concerning for hepatic congestion secondary to ADHF.   -Pt has had RHC which shows low flow in past.   -She has moderately elevated JVP and 1+ LE edema.   -Renal function and LFTs have improved w/ diuresis.   -Would increase Bumex to 2 mg IV BID and d/c metolazone.  -Also consider d/c lisinopril and switch to valsartan 40 mg po BID w/ eventual transition to Entresto.   -Will uptitrate Coreg when more euvolemic.   -Get ICD interrogation.  -She wants to follow up at [facility]. -Abodominal pain and elevated LVTs concerning for hepatic congestion secondary to ADHF.   -Pt has had RHC which shows low flow in past.   -She has moderately elevated JVP and 1+ LE edema.   -Renal function has improved w/ diuresis. LFTs remain elevated.   -Would increase Bumex to 2 mg IV BID and d/c metolazone.  -Also consider d/c lisinopril and switch to valsartan 40 mg po BID w/ eventual transition to Entresto. Add aldactone 12.5 mg po qd.   -Will uptitrate Coreg when more euvolemic.   -Get ICD interrogation.  -She wants to follow up at [facility]. -Abodominal pain and elevated LVTs concerning for hepatic congestion secondary to ADHF.   -Pt has had RHC which shows low flow in past.   -She has moderately elevated JVP and 1+ LE edema.   -Renal function has improved w/ diuresis. LFTs remain elevated.   -As d/w Dr. Gallego will give Bumex 1 mg IV x 1, followed by Lasix gtt 20 mg/hr. Add Aldactone 25 mg po BID for potassium sparing effect.   -Also  d/c lisinopril and switch to valsartan 40 mg po BID w/ eventual transition to Entresto.  -Will uptitrate Coreg when more euvolemic.   -Get ICD interrogation.  -She wants to follow up at [facility].

## 2018-01-19 NOTE — CONSULT NOTE ADULT - ASSESSMENT
61yo F with abd pain, n/v of unclear etiology. Unlikely related to gallbladder given no inflammatory signs seen on US and no focal tenderness on physical exam. Favor etiology related to CHF causing hepatic congestion. Pt was undergoing workup by GI in June 2017 before pt left AMA. Would recommend continuing workup including UGI to assess for gastroparesis (pt with poorly controlled DM)    - No acute surgical intervention at this time  - Consider GI/cardiology consults  - Dispo per ED  - D/w Dr. Harv[facility] team  [id number]
63yo female h/o DM2 (A1C 7.5 5/17), CHFrEF,  thyroid CA s/p thyroidectomy c/b persistent hypocalcemia, hypertension, hyperlipidemia, chronic RBBB,  obesity, thyroid CA, history of NICM s/p ICD 6/2017 p/w abdominal pain. Pt is knows to our service from previous admission in May 2017 for persistent abdominal pain with nausea and found to have transaminitis. During that admission she underwent EGD with esophagitis and duodenopathy and MRCP was also obtained and did not show any evidence of biliary obstruction at that time. Further imaging with CT Abd showed 'Contracted gallbladder with intraluminal stones. Nonspecific gallbladder wall thickening or edema. Appearance is unchanged from the prior recent CT and MRI examinations.
61 y/o female w/ PMHX of NICM, HFrEF (LVEF 22%, LVIDd 6.8), moderate MR, moderately dilated LA, decreased RV systolic function, mod TR and moderate pulm HTN, ICD, HTN, HLD, DM, asthma, thyroid CA s/p thyroidectomy, comes in with abdominal pain x 1 week. She states she was at [facility] for 10 days with increased fluid levels and thinks this time her fluid levels are balanced. Denies SOB at rest, orthopnea, PND, CP, palpitations, dizziness, syncope.     Has moderately elevated JVP and 1+ LE edema b/l.

## 2018-01-19 NOTE — CONSULT NOTE ADULT - SUBJECTIVE AND OBJECTIVE BOX
Date of Admission: 1/16/18    CHIEF COMPLAINT: abdominal pain x 1 week.     HISTORY OF PRESENT ILLNESS:    61 y/o female w/ PMHX of NICM, HFrEF (LVEF 22%, LVIDd 6.8), moderate MR, moderately dilated LA, decreased RV systolic function, mod TR and moderate pulm HTN, ICD, HTN, HLD, DM, asthma, thyroid CA s/p thyroidectomy, comes in with abdominal pain x 1 week.       Allergies    penicillin (Rash)      MEDICATIONS:  aspirin enteric coated 81 milliGRAM(s) Oral daily  buMETAnide Injectable 1 milliGRAM(s) IV Push two times a day  carvedilol 3.125 milliGRAM(s) Oral every 12 hours  heparin  Injectable 5000 Unit(s) SubCutaneous every 12 hours  lisinopril 10 milliGRAM(s) Oral daily  metolazone 5 milliGRAM(s) Oral daily  benzonatate 100 milliGRAM(s) Oral every 8 hours  ondansetron Injectable 4 milliGRAM(s) IV Push every 6 hours PRN  aluminum hydroxide/magnesium hydroxide/simethicone Suspension 30 milliLiter(s) Oral every 4 hours PRN  dicyclomine 10 milliGRAM(s) Oral two times a day before meals PRN  pantoprazole    Tablet 40 milliGRAM(s) Oral before breakfast  simethicone 80 milliGRAM(s) Chew every 6 hours    dextrose 50% Injectable 12.5 Gram(s) IV Push once  dextrose 50% Injectable 25 Gram(s) IV Push once  dextrose 50% Injectable 25 Gram(s) IV Push once  dextrose Gel 1 Dose(s) Oral once PRN  glucagon  Injectable 1 milliGRAM(s) IntraMuscular once PRN  insulin lispro (HumaLOG) corrective regimen sliding scale   SubCutaneous three times a day before meals  levothyroxine 150 MICROGram(s) Oral daily    calcitriol   Capsule 0.5 MICROGram(s) Oral daily  calcium acetate 667 milliGRAM(s) Oral three times a day with meals  calcium gluconate IVPB 2 Gram(s) IV Intermittent once  dextrose 5%. 1000 milliLiter(s) IV Continuous <Continuous>  magnesium oxide 400 milliGRAM(s) Oral three times a day with meals      PAST MEDICAL & SURGICAL HISTORY:  Asthma  CHF (congestive heart failure): hypothyroid  DM (diabetes mellitus)  HTN (hypertension)  Thyroid ca  S/P thyroidectomy      FAMILY HISTORY:  No pertinent family history in first degree relatives      SOCIAL HISTORY:          REVIEW OF SYSTEMS:  CONSTITUTIONAL: No fever, weight loss, or fatigue  EYES: No eye pain, visual disturbances, or discharge  ENMT:  No difficulty hearing, tinnitus, vertigo; No sinus or throat pain  NECK: No pain or stiffness  RESPIRATORY: No cough, wheezing, chills or hemoptysis; No Shortness of Breath  CARDIOVASCULAR: No chest pain, palpitations, passing out, dizziness, or leg swelling  GASTROINTESTINAL: No abdominal or epigastric pain. No nausea, vomiting, or hematemesis; No diarrhea or constipation. No melena or hematochezia.  GENITOURINARY: No dysuria, frequency, hematuria, or incontinence  NEUROLOGICAL: No headaches, memory loss, loss of strength, numbness, or tremors  SKIN: No itching, burning, rashes, or lesions   LYMPH Nodes: No enlarged glands  ENDOCRINE: No heat or cold intolerance; No hair loss  MUSCULOSKELETAL: No joint pain or swelling; No muscle, back, or extremity pain  PSYCHIATRIC: No depression, anxiety, mood swings, or difficulty sleeping  HEME/LYMPH: No easy bruising, or bleeding gums  ALLERY AND IMMUNOLOGIC: No hives or eczema	    [ ] All others negative	  [ ] Unable to obtain    PHYSICAL EXAM:  T(C): 36.8 (01-19-18 @ 04:40), Max: 36.8 (01-19-18 @ 04:40)  HR: 84 (01-19-18 @ 04:40) (80 - 85)  BP: 112/69 (01-19-18 @ 04:40) (109/79 - 113/90)  RR: 18 (01-19-18 @ 04:40) (17 - 18)  SpO2: 99% (01-19-18 @ 04:40) (99% - 100%)  Wt(kg): --  I&O's Summary    18 Jan 2018 07:01  -  19 Jan 2018 07:00  --------------------------------------------------------  IN: 0 mL / OUT: 1300 mL / NET: -1300 mL    19 Jan 2018 07:01  -  19 Jan 2018 12:42  --------------------------------------------------------  IN: 0 mL / OUT: 800 mL / NET: -800 mL        Appearance: Normal	  HEENT:   Normal oral mucosa, PERRL, EOMI	  Lymphatic: No lymphadenopathy  Cardiovascular: Normal S1 S2, No JVD, No murmurs, No edema  Respiratory: Lungs clear to auscultation	  Psychiatry: A & O x 3, Mood & affect appropriate  Gastrointestinal:  Soft, Non-tender, + BS	  Skin: No rashes, No ecchymoses, No cyanosis	  Neurologic: Non-focal  Extremities: Normal range of motion, No clubbing, cyanosis or edema  Vascular: Peripheral pulses palpable 2+ bilaterally        LABS:	 	    CBC Full  -  ( 19 Jan 2018 06:40 )  WBC Count : 6.34 K/uL  Hemoglobin : 12.4 g/dL  Hematocrit : 36.9 %  Platelet Count - Automated : 263 K/uL  Mean Cell Volume : 78.5 fL  Mean Cell Hemoglobin : 26.4 pg  Mean Cell Hemoglobin Concentration : 33.6 %      01-19    137  |  94<L>  |  21  ----------------------------<  140<H>  3.9   |  25  |  1.22  01-18    137  |  94<L>  |  23  ----------------------------<  125<H>  4.0   |  26  |  1.44<H>    Ca    6.1<LL>      19 Jan 2018 06:40  Ca    6.3<LL>      18 Jan 2018 05:30  Mg     1.4     01-19    TPro  7.1  /  Alb  3.7  /  TBili  1.3<H>  /  DBili  x   /  AST  114<H>  /  ALT  119<H>  /  AlkPhos  236<H>  01-19  TPro  5.9<L>  /  Alb  3.5  /  TBili  1.4<H>  /  DBili  0.9<H>  /  AST  97<H>  /  ALT  94<H>  /  AlkPhos  219<H>  01-18      proBNP:   Lipid Profile:   HgA1c:   TSH:     < from: TTE with Doppler (w/Cont) (05.16.17 @ 09:19) >  DIMENSIONS:  Dimensions:     Normal Values:  LA:     4.6 cm    2.0 - 4.0 cm  Ao:     2.5 cm    2.0 - 3.8 cm  SEPTUM: 0.8 cm    0.6 - 1.2 cm  PWT:    0.7 cm    0.6 - 1.1 cm  LVIDd:  6.8 cm    3.0 - 5.6 cm  LVIDs:  6.1cm    1.8 - 4.0 cm  Derived Variables:  LVMI: 99 g/m2  RWT: 0.20  Fractional short: 10 %  Ejection Fraction (Teicholtz): 22 %  ------------------------------------------------------------------------  OBSERVATIONS:  Mitral Valve: Mitral annular calcification, otherwise  normal mitral valve. Moderate mitral regurgitation.  Aortic Root: Normal aortic root.  Aortic Valve: Calcified trileaflet aortic valve with normal  opening.  Left Atrium: Moderately dilated left atrium.  LA volume  index = 46 cc/m2.  Left Ventricle: Severe global left ventricular systolic  dysfunction.  Endocardial visualization enhanced with  intravenous injection of echo contrast (Definity). Severe  left ventricular enlargement.  Right Heart: Normal right atrium. Normal right ventricular  size with decreased right ventricular systolic function.  Normal tricuspid valve.  Moderate tricuspid regurgitation.  Normal pulmonic valve.  Pericardium/PleuraNormal pericardium with no pericardial  effusion.  Hemodynamic: Estimated right ventricular systolic pressure  equals 53 mm Hg, assuming right atrial pressure equals 10  mm Hg, consistent with moderate pulmonary hypertension.  ------------------------------------------------------------------------    < end of copied text >    < from: Xray Chest 1 View AP- PORTABLE-Urgent (06.12.17 @ 17:47) >  The cardiomediastinal silhouette is enlarged in size. There is no pleural   effusion. There is no pneumothorax. No focal consolidation identified.   Unremarkable osseous structures.    IMPRESSION:   Clear lungs. Stable cardiomegaly.    < end of copied text > Date of Admission: 1/16/18    CHIEF COMPLAINT: abdominal pain x 1 week.     HISTORY OF PRESENT ILLNESS:    61 y/o female w/ PMHX of NICM, HFrEF (LVEF 22%, LVIDd 6.8), moderate MR, moderately dilated LA, decreased RV systolic function, mod TR and moderate pulm HTN, ICD, HTN, HLD, DM, asthma, thyroid CA s/p thyroidectomy, comes in with abdominal pain x 1 week. She states she was at [facility] for 10 days with increased fluid levels and thinks this time her fluid levels are balanced. Denies SOB at rest, orthopnea, PND, CP, palpitations, dizziness, syncope.       Allergies    penicillin (Rash)      MEDICATIONS:  aspirin enteric coated 81 milliGRAM(s) Oral daily  buMETAnide Injectable 1 milliGRAM(s) IV Push two times a day  carvedilol 3.125 milliGRAM(s) Oral every 12 hours  heparin  Injectable 5000 Unit(s) SubCutaneous every 12 hours  lisinopril 10 milliGRAM(s) Oral daily  metolazone 5 milliGRAM(s) Oral daily  benzonatate 100 milliGRAM(s) Oral every 8 hours  ondansetron Injectable 4 milliGRAM(s) IV Push every 6 hours PRN  aluminum hydroxide/magnesium hydroxide/simethicone Suspension 30 milliLiter(s) Oral every 4 hours PRN  dicyclomine 10 milliGRAM(s) Oral two times a day before meals PRN  pantoprazole    Tablet 40 milliGRAM(s) Oral before breakfast  simethicone 80 milliGRAM(s) Chew every 6 hours    dextrose 50% Injectable 12.5 Gram(s) IV Push once  dextrose 50% Injectable 25 Gram(s) IV Push once  dextrose 50% Injectable 25 Gram(s) IV Push once  dextrose Gel 1 Dose(s) Oral once PRN  glucagon  Injectable 1 milliGRAM(s) IntraMuscular once PRN  insulin lispro (HumaLOG) corrective regimen sliding scale   SubCutaneous three times a day before meals  levothyroxine 150 MICROGram(s) Oral daily    calcitriol   Capsule 0.5 MICROGram(s) Oral daily  calcium acetate 667 milliGRAM(s) Oral three times a day with meals  calcium gluconate IVPB 2 Gram(s) IV Intermittent once  dextrose 5%. 1000 milliLiter(s) IV Continuous <Continuous>  magnesium oxide 400 milliGRAM(s) Oral three times a day with meals      PAST MEDICAL & SURGICAL HISTORY:  Asthma  CHF (congestive heart failure): hypothyroid  DM (diabetes mellitus)  HTN (hypertension)  Thyroid ca  S/P thyroidectomy      FAMILY HISTORY:  Father passed away at age 30 of sudden death.  Mother alive and well at age 86.        SOCIAL HISTORY:    Works as  a , has 4 children.   No ETOH, drug use or smoking.         REVIEW OF SYSTEMS:  CONSTITUTIONAL: No fever, weight loss, or fatigue  EYES: No eye pain, visual disturbances, or discharge  ENMT:  No difficulty hearing, tinnitus, vertigo; No sinus or throat pain  NECK: No pain or stiffness  RESPIRATORY: No cough, wheezing, chills or hemoptysis; No Shortness of Breath  CARDIOVASCULAR: No chest pain, palpitations, passing out, dizziness, has some LE edema   GASTROINTESTINAL: no more abdominal or epigastric pain. No nausea, vomiting, or hematemesis; No diarrhea or constipation. No melena or hematochezia.  GENITOURINARY: No dysuria, frequency, hematuria, or incontinence  NEUROLOGICAL: No headaches, memory loss, loss of strength, numbness, or tremors  SKIN: No itching, burning, rashes, or lesions   LYMPH Nodes: No enlarged glands  ENDOCRINE: No heat or cold intolerance; No hair loss  MUSCULOSKELETAL: No joint pain or swelling; No muscle, back, or extremity pain  PSYCHIATRIC: No depression, anxiety, mood swings, or difficulty sleeping  HEME/LYMPH: No easy bruising, or bleeding gums  ALLERY AND IMMUNOLOGIC: No hives or eczema	        PHYSICAL EXAM:  T(C): 36.8 (01-19-18 @ 04:40), Max: 36.8 (01-19-18 @ 04:40)  HR: 84 (01-19-18 @ 04:40) (80 - 85)  BP: 112/69 (01-19-18 @ 04:40) (109/79 - 113/90)  RR: 18 (01-19-18 @ 04:40) (17 - 18)  SpO2: 99% (01-19-18 @ 04:40) (99% - 100%)  Wt(kg): --  I&O's Summary    18 Jan 2018 07:01  -  19 Jan 2018 07:00  --------------------------------------------------------  IN: 0 mL / OUT: 1300 mL / NET: -1300 mL    19 Jan 2018 07:01  -  19 Jan 2018 12:42  --------------------------------------------------------  IN: 0 mL / OUT: 800 mL / NET: -800 mL        Appearance: Normal	  HEENT:   Normal oral mucosa, PERRL, EOMI	  Lymphatic: No lymphadenopathy  Cardiovascular: Normal S1 S2, No JVD, No murmurs, No edema  Respiratory: Lungs clear to auscultation	  Psychiatry: A & O x 3, Mood & affect appropriate  Gastrointestinal:  Soft, Non-tender, + BS	  Skin: No rashes, No ecchymoses, No cyanosis	  Neurologic: Non-focal  Extremities: Normal range of motion, No clubbing, cyanosis or edema  Vascular: Peripheral pulses palpable 2+ bilaterally        LABS:	 	    CBC Full  -  ( 19 Jan 2018 06:40 )  WBC Count : 6.34 K/uL  Hemoglobin : 12.4 g/dL  Hematocrit : 36.9 %  Platelet Count - Automated : 263 K/uL  Mean Cell Volume : 78.5 fL  Mean Cell Hemoglobin : 26.4 pg  Mean Cell Hemoglobin Concentration : 33.6 %      01-19    137  |  94<L>  |  21  ----------------------------<  140<H>  3.9   |  25  |  1.22  01-18    137  |  94<L>  |  23  ----------------------------<  125<H>  4.0   |  26  |  1.44<H>    Ca    6.1<LL>      19 Jan 2018 06:40  Ca    6.3<LL>      18 Jan 2018 05:30  Mg     1.4     01-19    TPro  7.1  /  Alb  3.7  /  TBili  1.3<H>  /  DBili  x   /  AST  114<H>  /  ALT  119<H>  /  AlkPhos  236<H>  01-19  TPro  5.9<L>  /  Alb  3.5  /  TBili  1.4<H>  /  DBili  0.9<H>  /  AST  97<H>  /  ALT  94<H>  /  AlkPhos  219<H>  01-18      proBNP:   Lipid Profile:   HgA1c:   TSH:     < from: TTE with Doppler (w/Cont) (05.16.17 @ 09:19) >  DIMENSIONS:  Dimensions:     Normal Values:  LA:     4.6 cm    2.0 - 4.0 cm  Ao:     2.5 cm    2.0 - 3.8 cm  SEPTUM: 0.8 cm    0.6 - 1.2 cm  PWT:    0.7 cm    0.6 - 1.1 cm  LVIDd:  6.8 cm    3.0 - 5.6 cm  LVIDs:  6.1cm    1.8 - 4.0 cm  Derived Variables:  LVMI: 99 g/m2  RWT: 0.20  Fractional short: 10 %  Ejection Fraction (Teicholtz): 22 %  ------------------------------------------------------------------------  OBSERVATIONS:  Mitral Valve: Mitral annular calcification, otherwise  normal mitral valve. Moderate mitral regurgitation.  Aortic Root: Normal aortic root.  Aortic Valve: Calcified trileaflet aortic valve with normal  opening.  Left Atrium: Moderately dilated left atrium.  LA volume  index = 46 cc/m2.  Left Ventricle: Severe global left ventricular systolic  dysfunction.  Endocardial visualization enhanced with  intravenous injection of echo contrast (Definity). Severe  left ventricular enlargement.  Right Heart: Normal right atrium. Normal right ventricular  size with decreased right ventricular systolic function.  Normal tricuspid valve.  Moderate tricuspid regurgitation.  Normal pulmonic valve.  Pericardium/PleuraNormal pericardium with no pericardial  effusion.  Hemodynamic: Estimated right ventricular systolic pressure  equals 53 mm Hg, assuming right atrial pressure equals 10  mm Hg, consistent with moderate pulmonary hypertension.  ------------------------------------------------------------------------    < end of copied text >    < from: Xray Chest 1 View AP- PORTABLE-Urgent (06.12.17 @ 17:47) >  The cardiomediastinal silhouette is enlarged in size. There is no pleural   effusion. There is no pneumothorax. No focal consolidation identified.   Unremarkable osseous structures.    IMPRESSION:   Clear lungs. Stable cardiomegaly.    < end of copied text > Date of Admission: 1/16/18    CHIEF COMPLAINT: abdominal pain x 1 week.     HISTORY OF PRESENT ILLNESS:    63 y/o female w/ PMHX of NICM, HFrEF (LVEF 22%, LVIDd 6.8), moderate MR, moderately dilated LA, decreased RV systolic function, mod TR and moderate pulm HTN, ICD, HTN, HLD, DM, asthma, thyroid CA s/p thyroidectomy, comes in with abdominal pain x 1 week. She states she was at [facility] for 10 days with increased fluid levels and thinks this time her fluid levels are balanced. Denies SOB at rest, orthopnea, PND, CP, palpitations, dizziness, syncope.       Allergies    penicillin (Rash)      MEDICATIONS:  aspirin enteric coated 81 milliGRAM(s) Oral daily  buMETAnide Injectable 1 milliGRAM(s) IV Push two times a day  carvedilol 3.125 milliGRAM(s) Oral every 12 hours  heparin  Injectable 5000 Unit(s) SubCutaneous every 12 hours  lisinopril 10 milliGRAM(s) Oral daily  metolazone 5 milliGRAM(s) Oral daily  benzonatate 100 milliGRAM(s) Oral every 8 hours  ondansetron Injectable 4 milliGRAM(s) IV Push every 6 hours PRN  aluminum hydroxide/magnesium hydroxide/simethicone Suspension 30 milliLiter(s) Oral every 4 hours PRN  dicyclomine 10 milliGRAM(s) Oral two times a day before meals PRN  pantoprazole    Tablet 40 milliGRAM(s) Oral before breakfast  simethicone 80 milliGRAM(s) Chew every 6 hours    dextrose 50% Injectable 12.5 Gram(s) IV Push once  dextrose 50% Injectable 25 Gram(s) IV Push once  dextrose 50% Injectable 25 Gram(s) IV Push once  dextrose Gel 1 Dose(s) Oral once PRN  glucagon  Injectable 1 milliGRAM(s) IntraMuscular once PRN  insulin lispro (HumaLOG) corrective regimen sliding scale   SubCutaneous three times a day before meals  levothyroxine 150 MICROGram(s) Oral daily    calcitriol   Capsule 0.5 MICROGram(s) Oral daily  calcium acetate 667 milliGRAM(s) Oral three times a day with meals  calcium gluconate IVPB 2 Gram(s) IV Intermittent once  dextrose 5%. 1000 milliLiter(s) IV Continuous <Continuous>  magnesium oxide 400 milliGRAM(s) Oral three times a day with meals      PAST MEDICAL & SURGICAL HISTORY:  Asthma  CHF (congestive heart failure): hypothyroid  DM (diabetes mellitus)  HTN (hypertension)  Thyroid ca  S/P thyroidectomy      FAMILY HISTORY:  Father passed away at age 30 of sudden death.  Mother alive and well at age 86.        SOCIAL HISTORY:    Works as  a , has 4 children.   No ETOH, drug use or smoking.         REVIEW OF SYSTEMS:  CONSTITUTIONAL: No fever, weight loss, or fatigue  EYES: No eye pain, visual disturbances, or discharge  ENMT:  No difficulty hearing, tinnitus, vertigo; No sinus or throat pain  NECK: No pain or stiffness  RESPIRATORY: No cough, wheezing, chills or hemoptysis; No Shortness of Breath  CARDIOVASCULAR: No chest pain, palpitations, passing out, dizziness, has some LE edema   GASTROINTESTINAL: no more abdominal or epigastric pain. No nausea, vomiting, or hematemesis; No diarrhea or constipation. No melena or hematochezia.  GENITOURINARY: No dysuria, frequency, hematuria, or incontinence  NEUROLOGICAL: No headaches, memory loss, loss of strength, numbness, or tremors  SKIN: No itching, burning, rashes, or lesions   LYMPH Nodes: No enlarged glands  ENDOCRINE: No heat or cold intolerance; No hair loss  MUSCULOSKELETAL: No joint pain or swelling; No muscle, back, or extremity pain  PSYCHIATRIC: No depression, anxiety, mood swings, or difficulty sleeping  HEME/LYMPH: No easy bruising, or bleeding gums  ALLERY AND IMMUNOLOGIC: No hives or eczema	        PHYSICAL EXAM:  T(C): 36.8 (01-19-18 @ 04:40), Max: 36.8 (01-19-18 @ 04:40)  HR: 84 (01-19-18 @ 04:40) (80 - 85)  BP: 112/69 (01-19-18 @ 04:40) (109/79 - 113/90)  RR: 18 (01-19-18 @ 04:40) (17 - 18)  SpO2: 99% (01-19-18 @ 04:40) (99% - 100%)  Wt(kg): --  I&O's Summary    18 Jan 2018 07:01  -  19 Jan 2018 07:00  --------------------------------------------------------  IN: 0 mL / OUT: 1300 mL / NET: -1300 mL    19 Jan 2018 07:01  -  19 Jan 2018 12:42  --------------------------------------------------------  IN: 0 mL / OUT: 800 mL / NET: -800 mL        Appearance: Normal	  HEENT:   Normal oral mucosa, PERRL, EOMI	  Lymphatic: No lymphadenopathy  Cardiovascular: Normal S1 S2, Moderate JVP to jawline, +ROSALINDA, 1+ LE edema bl.   Respiratory: Lungs clear to auscultation	  Psychiatry: A & O x 3, Mood & affect appropriate  Gastrointestinal:  Soft, Non-tender, + BS	  Skin: No rashes, No ecchymoses, No cyanosis	  Neurologic: Non-focal  Extremities: Normal range of motion, No clubbing, cyanosis or edema  Vascular: Peripheral pulses palpable 2+ bilaterally        LABS:	 	    CBC Full  -  ( 19 Jan 2018 06:40 )  WBC Count : 6.34 K/uL  Hemoglobin : 12.4 g/dL  Hematocrit : 36.9 %  Platelet Count - Automated : 263 K/uL  Mean Cell Volume : 78.5 fL  Mean Cell Hemoglobin : 26.4 pg  Mean Cell Hemoglobin Concentration : 33.6 %      01-19    137  |  94<L>  |  21  ----------------------------<  140<H>  3.9   |  25  |  1.22  01-18    137  |  94<L>  |  23  ----------------------------<  125<H>  4.0   |  26  |  1.44<H>    Ca    6.1<LL>      19 Jan 2018 06:40  Ca    6.3<LL>      18 Jan 2018 05:30  Mg     1.4     01-19    TPro  7.1  /  Alb  3.7  /  TBili  1.3<H>  /  DBili  x   /  AST  114<H>  /  ALT  119<H>  /  AlkPhos  236<H>  01-19  TPro  5.9<L>  /  Alb  3.5  /  TBili  1.4<H>  /  DBili  0.9<H>  /  AST  97<H>  /  ALT  94<H>  /  AlkPhos  219<H>  01-18      proBNP:   Lipid Profile:   HgA1c:   TSH:     < from: TTE with Doppler (w/Cont) (05.16.17 @ 09:19) >  DIMENSIONS:  Dimensions:     Normal Values:  LA:     4.6 cm    2.0 - 4.0 cm  Ao:     2.5 cm    2.0 - 3.8 cm  SEPTUM: 0.8 cm    0.6 - 1.2 cm  PWT:    0.7 cm    0.6 - 1.1 cm  LVIDd:  6.8 cm    3.0 - 5.6 cm  LVIDs:  6.1cm    1.8 - 4.0 cm  Derived Variables:  LVMI: 99 g/m2  RWT: 0.20  Fractional short: 10 %  Ejection Fraction (Teicholtz): 22 %  ------------------------------------------------------------------------  OBSERVATIONS:  Mitral Valve: Mitral annular calcification, otherwise  normal mitral valve. Moderate mitral regurgitation.  Aortic Root: Normal aortic root.  Aortic Valve: Calcified trileaflet aortic valve with normal  opening.  Left Atrium: Moderately dilated left atrium.  LA volume  index = 46 cc/m2.  Left Ventricle: Severe global left ventricular systolic  dysfunction.  Endocardial visualization enhanced with  intravenous injection of echo contrast (Definity). Severe  left ventricular enlargement.  Right Heart: Normal right atrium. Normal right ventricular  size with decreased right ventricular systolic function.  Normal tricuspid valve.  Moderate tricuspid regurgitation.  Normal pulmonic valve.  Pericardium/PleuraNormal pericardium with no pericardial  effusion.  Hemodynamic: Estimated right ventricular systolic pressure  equals 53 mm Hg, assuming right atrial pressure equals 10  mm Hg, consistent with moderate pulmonary hypertension.  ------------------------------------------------------------------------    < end of copied text >    < from: Xray Chest 1 View AP- PORTABLE-Urgent (06.12.17 @ 17:47) >  The cardiomediastinal silhouette is enlarged in size. There is no pleural   effusion. There is no pneumothorax. No focal consolidation identified.   Unremarkable osseous structures.    IMPRESSION:   Clear lungs. Stable cardiomegaly.    < end of copied text > Date of Admission: 18    CHIEF COMPLAINT: abdominal pain x 1 week.     HISTORY OF PRESENT ILLNESS:    61 y/o female w/ PMHX of NICM, HFrEF (LVEF 22%, LVIDd 6.8), moderate MR, moderately dilated LA, decreased RV systolic function, mod TR and moderate pulm HTN, ICD, HTN, HLD, DM, asthma, thyroid CA s/p thyroidectomy, comes in with abdominal pain x 1 week. She states she was at [facility] for 10 days with increased fluid levels and thinks this time her fluid levels are balanced. Denies SOB at rest, orthopnea, PND, CP, palpitations, dizziness, syncope.       Allergies    penicillin (Rash)      MEDICATIONS:  aspirin enteric coated 81 milliGRAM(s) Oral daily  buMETAnide Injectable 1 milliGRAM(s) IV Push two times a day  carvedilol 3.125 milliGRAM(s) Oral every 12 hours  heparin  Injectable 5000 Unit(s) SubCutaneous every 12 hours  lisinopril 10 milliGRAM(s) Oral daily  metolazone 5 milliGRAM(s) Oral daily  benzonatate 100 milliGRAM(s) Oral every 8 hours  ondansetron Injectable 4 milliGRAM(s) IV Push every 6 hours PRN  aluminum hydroxide/magnesium hydroxide/simethicone Suspension 30 milliLiter(s) Oral every 4 hours PRN  dicyclomine 10 milliGRAM(s) Oral two times a day before meals PRN  pantoprazole    Tablet 40 milliGRAM(s) Oral before breakfast  simethicone 80 milliGRAM(s) Chew every 6 hours    dextrose 50% Injectable 12.5 Gram(s) IV Push once  dextrose 50% Injectable 25 Gram(s) IV Push once  dextrose 50% Injectable 25 Gram(s) IV Push once  dextrose Gel 1 Dose(s) Oral once PRN  glucagon  Injectable 1 milliGRAM(s) IntraMuscular once PRN  insulin lispro (HumaLOG) corrective regimen sliding scale   SubCutaneous three times a day before meals  levothyroxine 150 MICROGram(s) Oral daily    calcitriol   Capsule 0.5 MICROGram(s) Oral daily  calcium acetate 667 milliGRAM(s) Oral three times a day with meals  calcium gluconate IVPB 2 Gram(s) IV Intermittent once  dextrose 5%. 1000 milliLiter(s) IV Continuous <Continuous>  magnesium oxide 400 milliGRAM(s) Oral three times a day with meals      PAST MEDICAL & SURGICAL HISTORY:  Asthma  CHF (congestive heart failure): hypothyroid  DM (diabetes mellitus)  HTN (hypertension)  Thyroid ca  S/P thyroidectomy      FAMILY HISTORY:  Father passed away at age 30 of sudden death.  Mother alive and well at age 86.        SOCIAL HISTORY:    Works as  a , has 4 children.   No ETOH, drug use or smoking.         REVIEW OF SYSTEMS:  CONSTITUTIONAL: No fever, weight loss, or fatigue  EYES: No eye pain, visual disturbances, or discharge  ENMT:  No difficulty hearing, tinnitus, vertigo; No sinus or throat pain  NECK: No pain or stiffness  RESPIRATORY: No cough, wheezing, chills or hemoptysis; No Shortness of Breath  CARDIOVASCULAR: No chest pain, palpitations, passing out, dizziness, has some LE edema   GASTROINTESTINAL: no more abdominal or epigastric pain. No nausea, vomiting, or hematemesis; No diarrhea or constipation. No melena or hematochezia.  GENITOURINARY: No dysuria, frequency, hematuria, or incontinence  NEUROLOGICAL: No headaches, memory loss, loss of strength, numbness, or tremors  SKIN: No itching, burning, rashes, or lesions   LYMPH Nodes: No enlarged glands  ENDOCRINE: No heat or cold intolerance; No hair loss  MUSCULOSKELETAL: No joint pain or swelling; No muscle, back, or extremity pain  PSYCHIATRIC: No depression, anxiety, mood swings, or difficulty sleeping  HEME/LYMPH: No easy bruising, or bleeding gums  ALLERY AND IMMUNOLOGIC: No hives or eczema	        PHYSICAL EXAM:  T(C): 36.8 (18 @ 04:40), Max: 36.8 (18 @ 04:40)  HR: 84 (18 @ 04:40) (80 - 85)  BP: 112/69 (18 @ 04:40) (109/79 - 113/90)  RR: 18 (18 @ 04:40) (17 - 18)  SpO2: 99% (18 @ 04:40) (99% - 100%)  Wt(kg): --  I&O's Summary    2018 07:01  -  2018 07:00  --------------------------------------------------------  IN: 0 mL / OUT: 1300 mL / NET: -1300 mL    2018 07:01  -  2018 12:42  --------------------------------------------------------  IN: 0 mL / OUT: 800 mL / NET: -800 mL        Appearance: Normal	  HEENT:   Normal oral mucosa, PERRL, EOMI	  Lymphatic: No lymphadenopathy  Cardiovascular: Normal S1 S2, Moderate JVP to jawline, +ROSALINDA, 1+ LE edema bl.   Respiratory: Lungs clear to auscultation	  Psychiatry: A & O x 3, Mood & affect appropriate  Gastrointestinal:  Soft, Non-tender, + BS	  Skin: No rashes, No ecchymoses, No cyanosis	  Neurologic: Non-focal  Extremities: Normal range of motion, No clubbing, cyanosis or edema  Vascular: Peripheral pulses palpable 2+ bilaterally        LABS:	 	    CBC Full  -  ( 2018 06:40 )  WBC Count : 6.34 K/uL  Hemoglobin : 12.4 g/dL  Hematocrit : 36.9 %  Platelet Count - Automated : 263 K/uL  Mean Cell Volume : 78.5 fL  Mean Cell Hemoglobin : 26.4 pg  Mean Cell Hemoglobin Concentration : 33.6 %          137  |  94<L>  |  21  ----------------------------<  140<H>  3.9   |  25  |  1.22      137  |  94<L>  |  23  ----------------------------<  125<H>  4.0   |  26  |  1.44<H>    Ca    6.1<LL>      2018 06:40  Ca    6.3<LL>      2018 05:30  Mg     1.4         TPro  7.1  /  Alb  3.7  /  TBili  1.3<H>  /  DBili  x   /  AST  114<H>  /  ALT  119<H>  /  AlkPhos  236<H>    TPro  5.9<L>  /  Alb  3.5  /  TBili  1.4<H>  /  DBili  0.9<H>  /  AST  97<H>  /  ALT  94<H>  /  AlkPhos  219<H>  -18      proBNP:   Lipid Profile:   HgA1c:   TSH:     < from: TTE with Doppler (w/Cont) (17 @ 09:19) >  DIMENSIONS:  Dimensions:     Normal Values:  LA:     4.6 cm    2.0 - 4.0 cm  Ao:     2.5 cm    2.0 - 3.8 cm  SEPTUM: 0.8 cm    0.6 - 1.2 cm  PWT:    0.7 cm    0.6 - 1.1 cm  LVIDd:  6.8 cm    3.0 - 5.6 cm  LVIDs:  6.1cm    1.8 - 4.0 cm  Derived Variables:  LVMI: 99 g/m2  RWT: 0.20  Fractional short: 10 %  Ejection Fraction (Teicholtz): 22 %  ------------------------------------------------------------------------  OBSERVATIONS:  Mitral Valve: Mitral annular calcification, otherwise  normal mitral valve. Moderate mitral regurgitation.  Aortic Root: Normal aortic root.  Aortic Valve: Calcified trileaflet aortic valve with normal  opening.  Left Atrium: Moderately dilated left atrium.  LA volume  index = 46 cc/m2.  Left Ventricle: Severe global left ventricular systolic  dysfunction.  Endocardial visualization enhanced with  intravenous injection of echo contrast (Definity). Severe  left ventricular enlargement.  Right Heart: Normal right atrium. Normal right ventricular  size with decreased right ventricular systolic function.  Normal tricuspid valve.  Moderate tricuspid regurgitation.  Normal pulmonic valve.  Pericardium/PleuraNormal pericardium with no pericardial  effusion.  Hemodynamic: Estimated right ventricular systolic pressure  equals 53 mm Hg, assuming right atrial pressure equals 10  mm Hg, consistent with moderate pulmonary hypertension.  ------------------------------------------------------------------------    < end of copied text >    < from: Xray Chest 1 View AP- PORTABLE-Urgent (17 @ 17:47) >  The cardiomediastinal silhouette is enlarged in size. There is no pleural   effusion. There is no pneumothorax. No focal consolidation identified.   Unremarkable osseous structures.    IMPRESSION:   Clear lungs. Stable cardiomegaly.    < end of copied text >    < from: Cardiac Cath Lab (14 @ 16:49) >  CORONARY VESSELS: The coronary circulation is co-dominant.  LM:   -- LM: Normal.  LAD:   --  LAD: Normal.  CX:   --  Circumflex: Normal.  RCA:   --  RCA: Normal.  COMPLICATIONS: There were no complications.  SUMMARY:  HEMODYNAMICS: Hemodynamic assessment demonstrates no systemic hypertension,  severely elevated LVEDP, borderline low cardiac output, and markedly  elevated pulmonary capillary wedge pressure.  DIAGNOSTIC RECOMMENDATIONS: The patient's diuretic regimen should be  carefully increased. Patient management should include aggressive medical  therapy. The patient should follow a low fat and low calorie diet. Medical  management is recommended.  Prepared and signed by  Anni Leos M.D.  Signed 01/10/2014 20:19:01  HEMODYNAMIC TABLES  Pressures:  Baseline  Pressures:  - HR: 85  Pressures:  - Rhythm:  Pressures:  -- Left Ventricle (s/edp): 124/36/--  Pressures:  -- Pulmonary Artery (S/D/M): 47/24/36  Pressures:  -- Pulmonary Capillary Wedge: 29/31/24  Pressures:  -- Right Atrium (a/v/M): 15/12/11  Pressures:  -- Right Ventricle (s/edp): 43/15/--  Pressures:  Post Angio  Pressures:  - HR: 85  Pressures:  - Rhythm:  Pressures:  -- Aortic Pressure (S/D/M): 116/83/97  Pressures:  -- Left Ventricle (s/edp): 122/31/--  O2 Sats:  Baseline  O2 Sats:  - HR: 85  O2 Sats:  - Rhythm:  O2 Sats:  -- AO: 13.1/99.4/17.71  O2 Sats:  -- PA: 12.5/58.8/10  Outputs:  Baseline  Outputs:  -- CALCULATIONS[age]  Outputs:  -- CALCULATIONS: Body Surface Area: 2.17  Outputs:  -- CALCULATIONS: Height in cm: 170.00  Outputs:  -- CALCULATIONS: Sex: Female  Outputs:  -- CALCULATIONS: Weight in k.30  Outputs:  -- OUTPUTS: CO by Salvador: 3.52  Outputs:  -- OUTPUTS: Savlador cardiac index: 1.62  Outputs:  -- OUTPUTS: O2 consumption: 271.24  Outputs:  -- OUTPUTS: Vo2 Indexed: 125.00  Outputs:  -- RESISTANCES: Pulmonary vascular index (dsc): 592.22  Outputs:  -- RESISTANCES: Pulmonary vascular index (Wood Units): 7.40  Outputs:  -- RESISTANCES: Pulmonary vascular resistance (dsc): 272.92  Outputs:  -- RESISTANCES: Pulmonary vascular resistance (Wood Units): 3.41  Outputs:  -- RESISTANCES: Right ventricular stroke work: 13.00  Outputs:  -- RESISTANCES: Right ventricular stroke work index: 5.99  Outputs:  -- RESISTANCES: Total pulmonary index (dsc): 1776.65  Outputs:  -- RESISTANCES: Total pulmonary index (Wood Units): 22.21  Outputs:  -- RESISTANCES: Total pulmonary resistance (dsc): 818.76  Outputs:  -- RESISTANCES: Total pulmonary resistance (Wood Units): 10.24  Outputs:  -- SHUNTS: Pulmonary flow: 3.52  Outputs:  -- SHUNTS: Qp Indexed: 1.62  Outputs:  -- SHUNTS: Qs Indexed: 1.62  Outputs:  -- SHUNTS: Systemic flow: 3.52  Outputs:  Post Angio  Outputs:  -- OUTPUTS: O2 consumption: 271.24  Outputs:  -- OUTPUTS: Vo2 Indexed: 125.00    < end of copied text >

## 2018-01-19 NOTE — PROGRESS NOTE ADULT - PROBLEM SELECTOR PLAN 1
- likely related to Hepatic Congestion from RHF  - improved since admission   - Bentyl PRN   - ppi qd  - cont simethicone q6h

## 2018-01-19 NOTE — CHART NOTE - NSCHARTNOTEFT_GEN_A_CORE
Patient was seen by CHF team who recommended start Lasix gtt for ADHF.  Patient is currently refusing the Lasix gtt, stating that she doesn't want it to run all night because she'll have to get up to use the restroom too often.  Heart Failure was made aware.  Patient was made aware of the risks of not diuresing with Lasix gtt and still refuses.  Will continue to monitor.  Start Lasix gtt when patient is amenable. Patient was seen by CHF team who recommended start Lasix gtt for ADHF.  Patient was initially refusing the Lasix gtt, stating that she doesn't want it to run all night.  She then stated that she wants to continue following with her team at [facility] and as such does not want be on Lasix gtt at this time.  Heart Failure was made aware about patient refusing Lasix.  Patient was made aware of the risks of not diuresing with Lasix gtt and still refuses.  Will continue to monitor.  Start Lasix gtt when patient if amenable.

## 2018-01-20 ENCOUNTER — TRANSCRIPTION ENCOUNTER (OUTPATIENT)
Age: 63
End: 2018-01-20

## 2018-01-20 VITALS — WEIGHT: 220.68 LBS

## 2018-01-20 LAB
ALBUMIN SERPL ELPH-MCNC: 3.9 G/DL — SIGNIFICANT CHANGE UP (ref 3.3–5)
ALP SERPL-CCNC: 231 U/L — HIGH (ref 40–120)
ALT FLD-CCNC: 106 U/L — HIGH (ref 4–33)
AST SERPL-CCNC: 82 U/L — HIGH (ref 4–32)
BILIRUB SERPL-MCNC: 1.9 MG/DL — HIGH (ref 0.2–1.2)
BUN SERPL-MCNC: 24 MG/DL — HIGH (ref 7–23)
CALCIUM SERPL-MCNC: 7.7 MG/DL — LOW (ref 8.4–10.5)
CHLORIDE SERPL-SCNC: 91 MMOL/L — LOW (ref 98–107)
CO2 SERPL-SCNC: 30 MMOL/L — SIGNIFICANT CHANGE UP (ref 22–31)
CREAT SERPL-MCNC: 1.24 MG/DL — SIGNIFICANT CHANGE UP (ref 0.5–1.3)
GLUCOSE BLDC GLUCOMTR-MCNC: 130 MG/DL — HIGH (ref 70–99)
GLUCOSE BLDC GLUCOMTR-MCNC: 153 MG/DL — HIGH (ref 70–99)
GLUCOSE SERPL-MCNC: 217 MG/DL — HIGH (ref 70–99)
HCT VFR BLD CALC: 37.8 % — SIGNIFICANT CHANGE UP (ref 34.5–45)
HGB BLD-MCNC: 12.7 G/DL — SIGNIFICANT CHANGE UP (ref 11.5–15.5)
MAGNESIUM SERPL-MCNC: 1.5 MG/DL — LOW (ref 1.6–2.6)
MCHC RBC-ENTMCNC: 26 PG — LOW (ref 27–34)
MCHC RBC-ENTMCNC: 33.6 % — SIGNIFICANT CHANGE UP (ref 32–36)
MCV RBC AUTO: 77.5 FL — LOW (ref 80–100)
NRBC # FLD: 0 — SIGNIFICANT CHANGE UP
PHOSPHATE SERPL-MCNC: 5.6 MG/DL — HIGH (ref 2.5–4.5)
PLATELET # BLD AUTO: 283 K/UL — SIGNIFICANT CHANGE UP (ref 150–400)
PMV BLD: 11.2 FL — SIGNIFICANT CHANGE UP (ref 7–13)
POTASSIUM SERPL-MCNC: 3.6 MMOL/L — SIGNIFICANT CHANGE UP (ref 3.5–5.3)
POTASSIUM SERPL-SCNC: 3.6 MMOL/L — SIGNIFICANT CHANGE UP (ref 3.5–5.3)
PROT SERPL-MCNC: 7.5 G/DL — SIGNIFICANT CHANGE UP (ref 6–8.3)
RBC # BLD: 4.88 M/UL — SIGNIFICANT CHANGE UP (ref 3.8–5.2)
RBC # FLD: 19.6 % — HIGH (ref 10.3–14.5)
SODIUM SERPL-SCNC: 138 MMOL/L — SIGNIFICANT CHANGE UP (ref 135–145)
WBC # BLD: 5.76 K/UL — SIGNIFICANT CHANGE UP (ref 3.8–10.5)
WBC # FLD AUTO: 5.76 K/UL — SIGNIFICANT CHANGE UP (ref 3.8–10.5)

## 2018-01-20 RX ORDER — MAGNESIUM OXIDE 400 MG ORAL TABLET 241.3 MG
0 TABLET ORAL
Qty: 0 | Refills: 0 | COMMUNITY
Start: 2018-01-20

## 2018-01-20 RX ORDER — CALCIUM ACETATE 667 MG
1 TABLET ORAL
Qty: 90 | Refills: 0 | OUTPATIENT
Start: 2018-01-20 | End: 2018-02-18

## 2018-01-20 RX ORDER — VALSARTAN 80 MG/1
1 TABLET ORAL
Qty: 60 | Refills: 0 | OUTPATIENT
Start: 2018-01-20 | End: 2018-02-18

## 2018-01-20 RX ORDER — SIMETHICONE 80 MG/1
1 TABLET, CHEWABLE ORAL
Qty: 0 | Refills: 0 | COMMUNITY
Start: 2018-01-20

## 2018-01-20 RX ORDER — PANTOPRAZOLE SODIUM 20 MG/1
1 TABLET, DELAYED RELEASE ORAL
Qty: 0 | Refills: 0 | COMMUNITY
Start: 2018-01-20

## 2018-01-20 RX ORDER — SPIRONOLACTONE 25 MG/1
1 TABLET, FILM COATED ORAL
Qty: 60 | Refills: 0 | OUTPATIENT
Start: 2018-01-20 | End: 2018-02-18

## 2018-01-20 RX ORDER — LEVOTHYROXINE SODIUM 125 MCG
1 TABLET ORAL
Qty: 30 | Refills: 0 | OUTPATIENT
Start: 2018-01-20 | End: 2018-02-18

## 2018-01-20 RX ORDER — MAGNESIUM OXIDE 400 MG ORAL TABLET 241.3 MG
1 TABLET ORAL
Qty: 0 | Refills: 0 | COMMUNITY
Start: 2018-01-20

## 2018-01-20 RX ORDER — FUROSEMIDE 40 MG
1 TABLET ORAL
Qty: 60 | Refills: 0 | OUTPATIENT
Start: 2018-01-20 | End: 2018-02-18

## 2018-01-20 RX ADMIN — CARVEDILOL PHOSPHATE 3.12 MILLIGRAM(S): 80 CAPSULE, EXTENDED RELEASE ORAL at 05:52

## 2018-01-20 RX ADMIN — Medication 100 MILLIGRAM(S): at 11:15

## 2018-01-20 RX ADMIN — Medication 667 MILLIGRAM(S): at 08:28

## 2018-01-20 RX ADMIN — MAGNESIUM OXIDE 400 MG ORAL TABLET 400 MILLIGRAM(S): 241.3 TABLET ORAL at 08:27

## 2018-01-20 RX ADMIN — Medication 100 MILLIGRAM(S): at 05:52

## 2018-01-20 RX ADMIN — Medication 81 MILLIGRAM(S): at 11:14

## 2018-01-20 RX ADMIN — MAGNESIUM OXIDE 400 MG ORAL TABLET 400 MILLIGRAM(S): 241.3 TABLET ORAL at 11:15

## 2018-01-20 RX ADMIN — VALSARTAN 40 MILLIGRAM(S): 80 TABLET ORAL at 06:40

## 2018-01-20 RX ADMIN — Medication 1 TABLET(S): at 06:40

## 2018-01-20 RX ADMIN — Medication 667 MILLIGRAM(S): at 11:15

## 2018-01-20 RX ADMIN — Medication 150 MICROGRAM(S): at 05:53

## 2018-01-20 RX ADMIN — SPIRONOLACTONE 25 MILLIGRAM(S): 25 TABLET, FILM COATED ORAL at 05:52

## 2018-01-20 RX ADMIN — CALCITRIOL 0.5 MICROGRAM(S): 0.5 CAPSULE ORAL at 11:14

## 2018-01-20 NOTE — DISCHARGE NOTE ADULT - HAS THE PATIENT RECEIVED THE INFLUENZA VACCINE DURING THIS VISIT
This case was discussed with the off going physician. We will recheck a second troponin. This patient is well-known to the ED for frequent weekly visits for chest pain. I will review the second troponin for any acute changes.    8:09 PM  Second troponin is normal patient is encouraged to follow-up with cardiology per his usual discharge plans.    That was Erum Danielson MD  09/30/17 2009     No

## 2018-01-20 NOTE — DISCHARGE NOTE ADULT - CARE PROVIDERS DIRECT ADDRESSES
,DirectAddress_Unknown,[email or web address]..[facility]

## 2018-01-20 NOTE — PROGRESS NOTE ADULT - SUBJECTIVE AND OBJECTIVE BOX
[facility] [phone]        Patient seen and examined in bed.  SHe was not SOB        MEDICATIONS  (STANDING):  aspirin enteric coated 81 milliGRAM(s) Oral daily  benzonatate 100 milliGRAM(s) Oral every 8 hours  buMETAnide Injectable 1 milliGRAM(s) IV Push two times a day  calcitriol   Capsule 0.5 MICROGram(s) Oral daily  calcium acetate 667 milliGRAM(s) Oral three times a day with meals  carvedilol 3.125 milliGRAM(s) Oral every 12 hours  dextrose 5%. 1000 milliLiter(s) (50 mL/Hr) IV Continuous <Continuous>  dextrose 50% Injectable 12.5 Gram(s) IV Push once  dextrose 50% Injectable 25 Gram(s) IV Push once  dextrose 50% Injectable 25 Gram(s) IV Push once  heparin  Injectable 5000 Unit(s) SubCutaneous every 12 hours  insulin lispro (HumaLOG) corrective regimen sliding scale   SubCutaneous three times a day before meals  levothyroxine 150 MICROGram(s) Oral daily  lisinopril 10 milliGRAM(s) Oral daily  magnesium oxide 400 milliGRAM(s) Oral three times a day with meals  metolazone 5 milliGRAM(s) Oral daily  pantoprazole    Tablet 40 milliGRAM(s) Oral before breakfast  simethicone 80 milliGRAM(s) Chew every 6 hours      VITAL:  T(C): , Max: 36.8 (01-19-18 @ 04:40)  T(F): , Max: 98.3 (01-19-18 @ 04:40)  HR: 76 (01-19-18 @ 14:33)  BP: 117/79 (01-19-18 @ 14:33)  BP(mean): --  RR: 18 (01-19-18 @ 14:33)  SpO2: 100% (01-19-18 @ 14:33)  Wt(kg): --    I and O's:    01-18 @ 07:01  -  01-19 @ 07:00  --------------------------------------------------------  IN: 0 mL / OUT: 1300 mL / NET: -1300 mL    01-19 @ 07:01  -  01-19 @ 15:44  --------------------------------------------------------  IN: 0 mL / OUT: 800 mL / NET: -800 mL          PHYSICAL EXAM:    Constitutional: NAD  HEENT: PERRLA    Neck:  No JVD  Respiratory: CTAB/L  Cardiovascular: S1 and S2  Gastrointestinal: BS+, soft, NT/ND  Extremities: No peripheral edema  Neurological: A/O x 3, no focal deficits  Psychiatric: Normal mood, normal affect  : No Gustafson  Skin: No rashes  Access: Not applicable    LABS:                        12.4   6.34  )-----------( 263      ( 19 Jan 2018 06:40 )             36.9     01-19    137  |  94<L>  |  21  ----------------------------<  140<H>  3.9   |  25  |  1.22    Ca    6.1<LL>      19 Jan 2018 06:40  Mg     1.4     01-19    TPro  7.1  /  Alb  3.7  /  TBili  1.3<H>  /  DBili  x   /  AST  114<H>  /  ALT  119<H>  /  AlkPhos  236<H>  01-19          Urine Studies:          RADIOLOGY & ADDITIONAL STUDIES:
INTERVAL HPI/OVERNIGHT EVENTS:    family bedside this am   pt reports no n/v this morning  no abdominal pain   tolerating po intake  "normal bm's, no blood"    MEDICATIONS  (STANDING):  aspirin enteric coated 81 milliGRAM(s) Oral daily  benzonatate 100 milliGRAM(s) Oral every 8 hours  buMETAnide Injectable 1 milliGRAM(s) IV Push two times a day  calcitriol   Capsule 0.5 MICROGram(s) Oral daily  calcium acetate 667 milliGRAM(s) Oral three times a day with meals  calcium gluconate IVPB 2 Gram(s) IV Intermittent once  carvedilol 3.125 milliGRAM(s) Oral every 12 hours  dextrose 5%. 1000 milliLiter(s) (50 mL/Hr) IV Continuous <Continuous>  dextrose 50% Injectable 12.5 Gram(s) IV Push once  dextrose 50% Injectable 25 Gram(s) IV Push once  dextrose 50% Injectable 25 Gram(s) IV Push once  heparin  Injectable 5000 Unit(s) SubCutaneous every 12 hours  insulin lispro (HumaLOG) corrective regimen sliding scale   SubCutaneous three times a day before meals  levothyroxine 150 MICROGram(s) Oral daily  lisinopril 10 milliGRAM(s) Oral daily  magnesium oxide 400 milliGRAM(s) Oral three times a day with meals  metolazone 5 milliGRAM(s) Oral daily  pantoprazole    Tablet 40 milliGRAM(s) Oral before breakfast  simethicone 80 milliGRAM(s) Chew every 6 hours    MEDICATIONS  (PRN):  aluminum hydroxide/magnesium hydroxide/simethicone Suspension 30 milliLiter(s) Oral every 4 hours PRN Dyspepsia  dextrose Gel 1 Dose(s) Oral once PRN Blood Glucose LESS THAN 70 milliGRAM(s)/deciliter  dicyclomine 10 milliGRAM(s) Oral two times a day before meals PRN abdominal pain/spasms  glucagon  Injectable 1 milliGRAM(s) IntraMuscular once PRN Glucose LESS THAN 70 milligrams/deciliter  ondansetron Injectable 4 milliGRAM(s) IV Push every 6 hours PRN Nausea and/or Vomiting      Allergies    penicillin (Rash)    Intolerances        Review of Systems:    General:  No wt loss, fevers, chills, night sweats, fatigue   Eyes:  Good vision, no reported pain  ENT:  No sore throat, pain, runny nose, dysphagia  CV:  No pain, palpitations, hypo/hypertension  Resp:  No dyspnea, cough, tachypnea, wheezing  GI:  No pain, No nausea, No vomiting, No diarrhea, No constipation, No weight loss, No fever, No pruritis, No rectal bleeding, No melena, No dysphagia  :  No pain, bleeding, incontinence, nocturia  Muscle:  No pain, weakness  Neuro:  No weakness, tingling, memory problems  Psych:  No fatigue, insomnia, mood problems, depression  Endocrine:  No polyuria, polydypsia, cold/heat intolerance  Heme:  No petechiae, ecchymosis, easy bruisability  Skin:  No rash, tattoos, scars, edema      Vital Signs Last 24 Hrs  T(C): 36.8 (19 Jan 2018 04:40), Max: 36.8 (19 Jan 2018 04:40)  T(F): 98.3 (19 Jan 2018 04:40), Max: 98.3 (19 Jan 2018 04:40)  HR: 84 (19 Jan 2018 04:40) (80 - 85)  BP: 112/69 (19 Jan 2018 04:40) (109/79 - 113/90)  BP(mean): --  RR: 18 (19 Jan 2018 04:40) (17 - 18)  SpO2: 99% (19 Jan 2018 04:40) (99% - 100%)    PHYSICAL EXAM:    Constitutional: NAD, obese  HEENT: EOMI, throat clear  Neck: No LAD, supple  Respiratory: CTA and P  Cardiovascular: S1 and S2, RRR, no M  Gastrointestinal: BS+, soft, NT/ND, neg HSM,  Extremities: No peripheral edema, neg clubbing, cyanosis  Vascular: 2+ peripheral pulses  Neurological: A/O x 3, no focal deficits  Psychiatric: Normal mood, normal affect  Skin: No rashes      LABS:                        12.4   6.34  )-----------( 263      ( 19 Jan 2018 06:40 )             36.9     01-19    137  |  94<L>  |  21  ----------------------------<  140<H>  3.9   |  25  |  1.22    Ca    6.1<LL>      19 Jan 2018 06:40  Mg     1.4     01-19    TPro  7.1  /  Alb  3.7  /  TBili  1.3<H>  /  DBili  x   /  AST  114<H>  /  ALT  119<H>  /  AlkPhos  236<H>  01-19          RADIOLOGY & ADDITIONAL TESTS:  < from: NM Gastric Empty Solid/Liquid (01.18.18 @ 14:40) >    EXAM:  NM GASTRIC EMPTY SOLID/LIQUID        PROCEDURE DATE:  Jan 18 2018       INTERPRETATION:  RADIOPHARMACEUTICAL: 200 microcuries In-111 DTPA in 300   mL water; 1.0 mCi 99mTc-sulfur colloid in a standardized meal.     CLINICAL INFORMATION: 62 year old diabetic female with eructations and   abdominal pain; referred to evaluate for gastroparesis.    MEDICATIONS: Within 24 hours before the test, the patient was not taking   any medications that could affect the test results.    TECHNIQUE: Immediately before the beginning of the study, the patient's   fasting blood sugar was measured and was 116 mg/dL.  For the liquid phase   of the test, dynamic imaging of the abdomen was performed in the Kisw[place]ili   projection for 30 minutes following the oral ingestion of 300 mL water   mixed with In-111 DTPA. There was no postprandial vomiting.     After completion of the liquid phase, the solid phase component of the   test was performed. The patient consumed 100% of the radiolabeled meal   consisting of 4 ounces of cooked egg whites, two slices of toasted white   bread with approximately 30 g of strawberry jam, and 4 ounces of water   over about 12 minutes. There was no postprandial vomiting. Anterior and   posterior one-minute images of the stomach were obtained in the erect   position as soon as the patient finished the meal, and were repeated 1,   2, 3 and 4 hours later. After calculation of the geometric mean, and   correction for isotope decay, the percent of activity remaining in the   stomach at 1,2,3, and 4 hours was determined.    COMPARISON: No previous gastric emptying studies were available for   comparison.    FINDINGS: The T-1/2 emptying of gastric activity, for the liquid phase,   was 15 minutes (normal: 20 minutes or less).     The results of the solid phase component of the study were:           TIME              % RETENTION                  NORMAL VALUES             0                           100%           1                              48%       37-90%           2                              42%                                     30-60%           3                              13%                                    -----------------           4                              10%                                0-10%        IMPRESSION: Normal liquid and solid phase gastric emptying study.       No evidence of gastroparesis.                  CINTHYA GROVE M.D., CHIEF OF NUCLEAR MEDICINE  This document has been electronically signed. Jan 18 2018  2:52PM                < end of copied text >
INTERVAL HPI/OVERNIGHT EVENTS:    family bedside this am   pt reports no n/v this morning  no abdominal pain   tolerating po intake  "normal bm's, no blood"    MEDICATIONS  (STANDING):  aspirin enteric coated 81 milliGRAM(s) Oral daily  benzonatate 100 milliGRAM(s) Oral every 8 hours  buMETAnide Injectable 1 milliGRAM(s) IV Push two times a day  calcitriol   Capsule 0.5 MICROGram(s) Oral daily  calcium acetate 667 milliGRAM(s) Oral three times a day with meals  calcium gluconate IVPB 2 Gram(s) IV Intermittent once  carvedilol 3.125 milliGRAM(s) Oral every 12 hours  dextrose 5%. 1000 milliLiter(s) (50 mL/Hr) IV Continuous <Continuous>  dextrose 50% Injectable 12.5 Gram(s) IV Push once  dextrose 50% Injectable 25 Gram(s) IV Push once  dextrose 50% Injectable 25 Gram(s) IV Push once  heparin  Injectable 5000 Unit(s) SubCutaneous every 12 hours  insulin lispro (HumaLOG) corrective regimen sliding scale   SubCutaneous three times a day before meals  levothyroxine 150 MICROGram(s) Oral daily  lisinopril 10 milliGRAM(s) Oral daily  magnesium oxide 400 milliGRAM(s) Oral three times a day with meals  metolazone 5 milliGRAM(s) Oral daily  pantoprazole    Tablet 40 milliGRAM(s) Oral before breakfast  simethicone 80 milliGRAM(s) Chew every 6 hours    MEDICATIONS  (PRN):  aluminum hydroxide/magnesium hydroxide/simethicone Suspension 30 milliLiter(s) Oral every 4 hours PRN Dyspepsia  dextrose Gel 1 Dose(s) Oral once PRN Blood Glucose LESS THAN 70 milliGRAM(s)/deciliter  dicyclomine 10 milliGRAM(s) Oral two times a day before meals PRN abdominal pain/spasms  glucagon  Injectable 1 milliGRAM(s) IntraMuscular once PRN Glucose LESS THAN 70 milligrams/deciliter  ondansetron Injectable 4 milliGRAM(s) IV Push every 6 hours PRN Nausea and/or Vomiting      Allergies    penicillin (Rash)    Intolerances        Review of Systems:    General:  No wt loss, fevers, chills, night sweats, fatigue   Eyes:  Good vision, no reported pain  ENT:  No sore throat, pain, runny nose, dysphagia  CV:  No pain, palpitations, hypo/hypertension  Resp:  No dyspnea, cough, tachypnea, wheezing  GI:  No pain, No nausea, No vomiting, No diarrhea, No constipation, No weight loss, No fever, No pruritis, No rectal bleeding, No melena, No dysphagia  :  No pain, bleeding, incontinence, nocturia  Muscle:  No pain, weakness  Neuro:  No weakness, tingling, memory problems  Psych:  No fatigue, insomnia, mood problems, depression  Endocrine:  No polyuria, polydypsia, cold/heat intolerance  Heme:  No petechiae, ecchymosis, easy bruisability  Skin:  No rash, tattoos, scars, edema      Vital Signs Last 24 Hrs  T(C): 36.8 (19 Jan 2018 04:40), Max: 36.8 (19 Jan 2018 04:40)  T(F): 98.3 (19 Jan 2018 04:40), Max: 98.3 (19 Jan 2018 04:40)  HR: 84 (19 Jan 2018 04:40) (80 - 85)  BP: 112/69 (19 Jan 2018 04:40) (109/79 - 113/90)  BP(mean): --  RR: 18 (19 Jan 2018 04:40) (17 - 18)  SpO2: 99% (19 Jan 2018 04:40) (99% - 100%)    PHYSICAL EXAM:    Constitutional: NAD, obese  HEENT: EOMI, throat clear  Neck: No LAD, supple  Respiratory: CTA and P  Cardiovascular: S1 and S2, RRR, no M  Gastrointestinal: BS+, soft, NT/ND, neg HSM,  Extremities: No peripheral edema, neg clubbing, cyanosis  Vascular: 2+ peripheral pulses  Neurological: A/O x 3, no focal deficits  Psychiatric: Normal mood, normal affect  Skin: No rashes      LABS:                        12.4   6.34  )-----------( 263      ( 19 Jan 2018 06:40 )             36.9     01-19    137  |  94<L>  |  21  ----------------------------<  140<H>  3.9   |  25  |  1.22    Ca    6.1<LL>      19 Jan 2018 06:40  Mg     1.4     01-19    TPro  7.1  /  Alb  3.7  /  TBili  1.3<H>  /  DBili  x   /  AST  114<H>  /  ALT  119<H>  /  AlkPhos  236<H>  01-19          RADIOLOGY & ADDITIONAL TESTS:  < from: NM Gastric Empty Solid/Liquid (01.18.18 @ 14:40) >    EXAM:  NM GASTRIC EMPTY SOLID/LIQUID        PROCEDURE DATE:  Jan 18 2018       INTERPRETATION:  RADIOPHARMACEUTICAL: 200 microcuries In-111 DTPA in 300   mL water; 1.0 mCi 99mTc-sulfur colloid in a standardized meal.     CLINICAL INFORMATION: 62 year old diabetic female with eructations and   abdominal pain; referred to evaluate for gastroparesis.    MEDICATIONS: Within 24 hours before the test, the patient was not taking   any medications that could affect the test results.    TECHNIQUE: Immediately before the beginning of the study, the patient's   fasting blood sugar was measured and was 116 mg/dL.  For the liquid phase   of the test, dynamic imaging of the abdomen was performed in the Urdu   projection for 30 minutes following the oral ingestion of 300 mL water   mixed with In-111 DTPA. There was no postprandial vomiting.     After completion of the liquid phase, the solid phase component of the   test was performed. The patient consumed 100% of the radiolabeled meal   consisting of 4 ounces of cooked egg whites, two slices of toasted white   bread with approximately 30 g of strawberry jam, and 4 ounces of water   over about 12 minutes. There was no postprandial vomiting. Anterior and   posterior one-minute images of the stomach were obtained in the erect   position as soon as the patient finished the meal, and were repeated 1,   2, 3 and 4 hours later. After calculation of the geometric mean, and   correction for isotope decay, the percent of activity remaining in the   stomach at 1,2,3, and 4 hours was determined.    COMPARISON: No previous gastric emptying studies were available for   comparison.    FINDINGS: The T-1/2 emptying of gastric activity, for the liquid phase,   was 15 minutes (normal: 20 minutes or less).     The results of the solid phase component of the study were:           TIME              % RETENTION                  NORMAL VALUES             0                           100%           1                              48%       37-90%           2                              42%                                     30-60%           3                              13%                                    -----------------           4                              10%                                0-10%        IMPRESSION: Normal liquid and solid phase gastric emptying study.       No evidence of gastroparesis.                  CINTHYA GROVE M.D., CHIEF OF NUCLEAR MEDICINE  This document has been electronically signed. Jan 18 2018  2:52PM                < end of copied text >
INTERVAL HPI/OVERNIGHT EVENTS:    pt reports improvement in discomfort overnight into this morning  refused US Abd this morning      MEDICATIONS  (STANDING):  aspirin enteric coated 81 milliGRAM(s) Oral daily  benzonatate 100 milliGRAM(s) Oral every 8 hours  buMETAnide Injectable 1 milliGRAM(s) IV Push two times a day  calcitriol   Capsule 0.5 MICROGram(s) Oral daily  calcium acetate 667 milliGRAM(s) Oral three times a day with meals  carvedilol 3.125 milliGRAM(s) Oral every 12 hours  dextrose 5%. 1000 milliLiter(s) (50 mL/Hr) IV Continuous <Continuous>  dextrose 50% Injectable 12.5 Gram(s) IV Push once  dextrose 50% Injectable 25 Gram(s) IV Push once  dextrose 50% Injectable 25 Gram(s) IV Push once  heparin  Injectable 5000 Unit(s) SubCutaneous every 12 hours  insulin lispro (HumaLOG) corrective regimen sliding scale   SubCutaneous three times a day before meals  levothyroxine 150 MICROGram(s) Oral daily  lisinopril 10 milliGRAM(s) Oral daily  magnesium oxide 400 milliGRAM(s) Oral three times a day with meals  metolazone 5 milliGRAM(s) Oral daily  pantoprazole    Tablet 40 milliGRAM(s) Oral before breakfast  simethicone 80 milliGRAM(s) Chew every 6 hours    MEDICATIONS  (PRN):  aluminum hydroxide/magnesium hydroxide/simethicone Suspension 30 milliLiter(s) Oral every 4 hours PRN Dyspepsia  dextrose Gel 1 Dose(s) Oral once PRN Blood Glucose LESS THAN 70 milliGRAM(s)/deciliter  glucagon  Injectable 1 milliGRAM(s) IntraMuscular once PRN Glucose LESS THAN 70 milligrams/deciliter  ondansetron Injectable 4 milliGRAM(s) IV Push every 6 hours PRN Nausea and/or Vomiting      Allergies    penicillin (Rash)    Intolerances        Review of Systems:    General:  No wt loss, fevers, chills, night sweats, fatigue   Eyes:  Good vision, no reported pain  ENT:  No sore throat, pain, runny nose, dysphagia  CV:  No pain, palpitations, hypo/hypertension  Resp:  No dyspnea, cough, tachypnea, wheezing  GI:  No pain, No nausea, No vomiting, No diarrhea, No constipation, No weight loss, No fever, No pruritis, No rectal bleeding, No melena, No dysphagia  :  No pain, bleeding, incontinence, nocturia  Muscle:  No pain, weakness  Neuro:  No weakness, tingling, memory problems  Psych:  No fatigue, insomnia, mood problems, depression  Endocrine:  No polyuria, polydypsia, cold/heat intolerance  Heme:  No petechiae, ecchymosis, easy bruisability  Skin:  No rash, tattoos, scars, edema      Vital Signs Last 24 Hrs  T(C): 36.4 (18 Jan 2018 05:41), Max: 36.7 (17 Jan 2018 13:59)  T(F): 97.5 (18 Jan 2018 05:41), Max: 98.1 (17 Jan 2018 13:59)  HR: 69 (18 Jan 2018 05:41) (69 - 86)  BP: 99/62 (18 Jan 2018 05:41) (99/62 - 115/76)  BP(mean): --  RR: 18 (18 Jan 2018 05:41) (16 - 18)  SpO2: 99% (18 Jan 2018 05:41) (99% - 100%)    PHYSICAL EXAM:    Constitutional: NAD  HEENT: EOMI, throat clear  Neck: No LAD, supple  Respiratory: CTA and P  Cardiovascular: S1 and S2, RRR, no M  Gastrointestinal: BS+, soft, NT/ND, neg HSM,  Extremities: No peripheral edema, neg clubbing, cyanosis  Vascular: 2+ peripheral pulses  Neurological: A/O x 3, no focal deficits  Psychiatric: Normal mood, normal affect  Skin: No rashes      LABS:                        11.4   6.50  )-----------( 249      ( 18 Jan 2018 05:30 )             34.3     01-18    137  |  94<L>  |  23  ----------------------------<  125<H>  4.0   |  26  |  1.44<H>    Ca    6.3<LL>      18 Jan 2018 05:30  Phos  5.8     01-17  Mg     1.4     01-17    TPro  5.9<L>  /  Alb  3.5  /  TBili  1.4<H>  /  DBili  0.9<H>  /  AST  97<H>  /  ALT  94<H>  /  AlkPhos  219<H>  01-18    PT/INR - ( 16 Jan 2018 18:41 )   PT: 33.6 SEC;   INR: 2.86          PTT - ( 16 Jan 2018 18:41 )  PTT:31.4 SEC      RADIOLOGY & ADDITIONAL TESTS:
INTERVAL HPI/OVERNIGHT EVENTS: I feel better . No more abdominal pain or vomiting . Less SOB.   Vital Signs Last 24 Hrs  T(C): 36.6 (18 Jan 2018 15:28), Max: 36.6 (18 Jan 2018 15:28)  T(F): 97.9 (18 Jan 2018 15:28), Max: 97.9 (18 Jan 2018 15:28)  HR: 80 (18 Jan 2018 15:28) (69 - 84)  BP: 113/90 (18 Jan 2018 15:28) (99/62 - 113/90)  BP(mean): --  RR: 17 (18 Jan 2018 15:28) (17 - 18)  SpO2: 99% (18 Jan 2018 15:28) (99% - 100%)  I&O's Summary    17 Jan 2018 07:01  -  18 Jan 2018 07:00  --------------------------------------------------------  IN: 100 mL / OUT: 0 mL / NET: 100 mL      MEDICATIONS  (STANDING):  aspirin enteric coated 81 milliGRAM(s) Oral daily  benzonatate 100 milliGRAM(s) Oral every 8 hours  buMETAnide Injectable 1 milliGRAM(s) IV Push two times a day  calcitriol   Capsule 0.5 MICROGram(s) Oral daily  calcium acetate 667 milliGRAM(s) Oral three times a day with meals  calcium gluconate IVPB 2 Gram(s) IV Intermittent once  carvedilol 3.125 milliGRAM(s) Oral every 12 hours  dextrose 5%. 1000 milliLiter(s) (50 mL/Hr) IV Continuous <Continuous>  dextrose 50% Injectable 12.5 Gram(s) IV Push once  dextrose 50% Injectable 25 Gram(s) IV Push once  dextrose 50% Injectable 25 Gram(s) IV Push once  heparin  Injectable 5000 Unit(s) SubCutaneous every 12 hours  insulin lispro (HumaLOG) corrective regimen sliding scale   SubCutaneous three times a day before meals  levothyroxine 150 MICROGram(s) Oral daily  lisinopril 10 milliGRAM(s) Oral daily  magnesium oxide 400 milliGRAM(s) Oral three times a day with meals  metolazone 5 milliGRAM(s) Oral daily  pantoprazole    Tablet 40 milliGRAM(s) Oral before breakfast  simethicone 80 milliGRAM(s) Chew every 6 hours    MEDICATIONS  (PRN):  aluminum hydroxide/magnesium hydroxide/simethicone Suspension 30 milliLiter(s) Oral every 4 hours PRN Dyspepsia  dextrose Gel 1 Dose(s) Oral once PRN Blood Glucose LESS THAN 70 milliGRAM(s)/deciliter  glucagon  Injectable 1 milliGRAM(s) IntraMuscular once PRN Glucose LESS THAN 70 milligrams/deciliter  ondansetron Injectable 4 milliGRAM(s) IV Push every 6 hours PRN Nausea and/or Vomiting    LABS:                        11.4   6.50  )-----------( 249      ( 18 Jan 2018 05:30 )             34.3     01-18    137  |  94<L>  |  23  ----------------------------<  125<H>  4.0   |  26  |  1.44<H>    Ca    6.3<LL>      18 Jan 2018 05:30  Phos  5.8     01-17  Mg     1.4     01-17    TPro  5.9<L>  /  Alb  3.5  /  TBili  1.4<H>  /  DBili  0.9<H>  /  AST  97<H>  /  ALT  94<H>  /  AlkPhos  219<H>  01-18    PT/INR - ( 16 Jan 2018 18:41 )   PT: 33.6 SEC;   INR: 2.86          PTT - ( 16 Jan 2018 18:41 )  PTT:31.4 SEC    CAPILLARY BLOOD GLUCOSE      POCT Blood Glucose.: 116 mg/dL (18 Jan 2018 09:19)  POCT Blood Glucose.: 118 mg/dL (18 Jan 2018 08:06)  POCT Blood Glucose.: 174 mg/dL (17 Jan 2018 21:45)  POCT Blood Glucose.: 119 mg/dL (17 Jan 2018 17:49)          REVIEW OF SYSTEMS:  CONSTITUTIONAL: No fever, weight loss, or fatigue  EYES: No eye pain, visual disturbances, or discharge  ENMT:  No difficulty hearing, tinnitus, vertigo; No sinus or throat pain  NECK: No pain or stiffness  BREASTS: No pain, masses, or nipple discharge  RESPIRATORY: No cough, wheezing, chills or hemoptysis; No shortness of breath  CARDIOVASCULAR: No chest pain, palpitations, dizziness, or leg swelling  GASTROINTESTINAL: No abdominal or epigastric pain. No nausea, vomiting, or hematemesis; No diarrhea or constipation. No melena or hematochezia.  GENITOURINARY: No dysuria, frequency, hematuria, or incontinence  NEUROLOGICAL: No headaches, memory loss, loss of strength, numbness, or tremors  SKIN: No itching, burning, rashes, or lesions   LYMPH NODES: No enlarged glands  ENDOCRINE: No heat or cold intolerance; No hair loss  MUSCULOSKELETAL: No joint pain or swelling; No muscle, back, or extremity pain  PSYCHIATRIC: No depression, anxiety, mood swings, or difficulty sleeping  HEME/LYMPH: No easy bruising, or bleeding gums  ALLERY AND IMMUNOLOGIC: No hives or eczema    RADIOLOGY & ADDITIONAL TESTS:    Consultant(s) Notes Reviewed:  [x ] YES  [ ] NO    PHYSICAL EXAM:  GENERAL: NAD, Obese , not in any distress ,  HEAD:  Atraumatic, Normocephalic  EYES: EOMI, PERRLA, conjunctiva and sclera clear  ENMT: No tonsillar erythema, exudates, or enlargement; Moist mucous membranes, Good dentition, No lesions  NECK: Supple, + JVD, Normal thyroid  NERVOUS SYSTEM:  Alert & Oriented X3, No focal deficit   CHEST/LUNG: Good air entry bilateral with no  rales, rhonchi, wheezing, or rubs  HEART: Regular rate and rhythm; No murmurs, rubs, or gallops  ABDOMEN: Soft, Nontender, Nondistended; Bowel sounds present  EXTREMITIES:  2+ Peripheral Pulses, No clubbing, cyanosis, or edema  SKIN: No rashes or lesions    Care Discussed with Consultants/Other Providers [ x] YES  [ ] NO
INTERVAL HPI/OVERNIGHT EVENTS: I feel better.   Vital Signs Last 24 Hrs  T(C): 36.6 (19 Jan 2018 14:33), Max: 36.8 (19 Jan 2018 04:40)  T(F): 97.9 (19 Jan 2018 14:33), Max: 98.3 (19 Jan 2018 04:40)  HR: 76 (19 Jan 2018 14:33) (76 - 85)  BP: 117/79 (19 Jan 2018 14:33) (109/79 - 117/79)  BP(mean): --  RR: 18 (19 Jan 2018 14:33) (18 - 18)  SpO2: 100% (19 Jan 2018 14:33) (99% - 100%)  I&O's Summary    18 Jan 2018 07:01  -  19 Jan 2018 07:00  --------------------------------------------------------  IN: 0 mL / OUT: 1300 mL / NET: -1300 mL    19 Jan 2018 07:01  -  19 Jan 2018 18:53  --------------------------------------------------------  IN: 0 mL / OUT: 800 mL / NET: -800 mL      MEDICATIONS  (STANDING):  aspirin enteric coated 81 milliGRAM(s) Oral daily  benzonatate 100 milliGRAM(s) Oral every 8 hours  calcitriol   Capsule 0.5 MICROGram(s) Oral daily  calcium acetate 667 milliGRAM(s) Oral three times a day with meals  calcium gluconate IVPB 2 Gram(s) IV Intermittent once  carvedilol 3.125 milliGRAM(s) Oral every 12 hours  dextrose 5%. 1000 milliLiter(s) (50 mL/Hr) IV Continuous <Continuous>  dextrose 50% Injectable 12.5 Gram(s) IV Push once  dextrose 50% Injectable 25 Gram(s) IV Push once  dextrose 50% Injectable 25 Gram(s) IV Push once  furosemide Infusion 20 mG/Hr (10 mL/Hr) IV Continuous <Continuous>  heparin  Injectable 5000 Unit(s) SubCutaneous every 12 hours  insulin lispro (HumaLOG) corrective regimen sliding scale   SubCutaneous three times a day before meals  levothyroxine 150 MICROGram(s) Oral daily  magnesium oxide 400 milliGRAM(s) Oral three times a day with meals  pantoprazole    Tablet 40 milliGRAM(s) Oral before breakfast  simethicone 80 milliGRAM(s) Chew every 6 hours  spironolactone 25 milliGRAM(s) Oral two times a day  valsartan 40 milliGRAM(s) Oral two times a day    MEDICATIONS  (PRN):  aluminum hydroxide/magnesium hydroxide/simethicone Suspension 30 milliLiter(s) Oral every 4 hours PRN Dyspepsia  dextrose Gel 1 Dose(s) Oral once PRN Blood Glucose LESS THAN 70 milliGRAM(s)/deciliter  dicyclomine 10 milliGRAM(s) Oral two times a day before meals PRN abdominal pain/spasms  glucagon  Injectable 1 milliGRAM(s) IntraMuscular once PRN Glucose LESS THAN 70 milligrams/deciliter  ondansetron Injectable 4 milliGRAM(s) IV Push every 6 hours PRN Nausea and/or Vomiting    LABS:                        12.4   6.34  )-----------( 263      ( 19 Jan 2018 06:40 )             36.9     01-19    137  |  94<L>  |  21  ----------------------------<  140<H>  3.9   |  25  |  1.22    Ca    6.1<LL>      19 Jan 2018 06:40  Mg     1.4     01-19    TPro  7.1  /  Alb  3.7  /  TBili  1.3<H>  /  DBili  x   /  AST  114<H>  /  ALT  119<H>  /  AlkPhos  236<H>  01-19        CAPILLARY BLOOD GLUCOSE      POCT Blood Glucose.: 122 mg/dL (19 Jan 2018 17:00)  POCT Blood Glucose.: 172 mg/dL (19 Jan 2018 12:11)  POCT Blood Glucose.: 143 mg/dL (19 Jan 2018 08:29)  POCT Blood Glucose.: 153 mg/dL (18 Jan 2018 21:27)          REVIEW OF SYSTEMS:  CONSTITUTIONAL: No fever, weight loss, or fatigue  EYES: No eye pain, visual disturbances, or discharge  ENMT:  No difficulty hearing, tinnitus, vertigo; No sinus or throat pain  NECK: No pain or stiffness  BREASTS: No pain, masses, or nipple discharge  RESPIRATORY: No cough, wheezing, chills or hemoptysis; No shortness of breath  CARDIOVASCULAR: No chest pain, palpitations, dizziness, or leg swelling  GASTROINTESTINAL: No abdominal or epigastric pain. No nausea, vomiting, or hematemesis; No diarrhea or constipation. No melena or hematochezia.  GENITOURINARY: No dysuria, frequency, hematuria, or incontinence  NEUROLOGICAL: No headaches, memory loss, loss of strength, numbness, or tremors  SKIN: No itching, burning, rashes, or lesions   LYMPH NODES: No enlarged glands  ENDOCRINE: No heat or cold intolerance; No hair loss  MUSCULOSKELETAL: No joint pain or swelling; No muscle, back, or extremity pain  PSYCHIATRIC: No depression, anxiety, mood swings, or difficulty sleeping  HEME/LYMPH: No easy bruising, or bleeding gums  ALLERY AND IMMUNOLOGIC: No hives or eczema    RADIOLOGY & ADDITIONAL TESTS:    Consultant(s) Notes Reviewed:  [x ] YES  [ ] NO    PHYSICAL EXAM:  GENERAL: NAD, well-groomed, well-developed,not in any distress ,  HEAD:  Atraumatic, Normocephalic  EYES: EOMI, PERRLA, conjunctiva and sclera clear  ENMT: No tonsillar erythema, exudates, or enlargement; Moist mucous membranes, Good dentition, No lesions  NECK: Supple, No JVD, Normal thyroid  NERVOUS SYSTEM:  Alert & Oriented X3, No focal deficit   CHEST/LUNG: Good air entry bilateral with no  rales, rhonchi, wheezing, or rubs  HEART: Regular rate and rhythm; No murmurs, rubs, or gallops  ABDOMEN: Soft, Nontender, Nondistended; Bowel sounds present  EXTREMITIES:  2+ Peripheral Pulses, No clubbing, cyanosis, or edema  SKIN: No rashes or lesions    Care Discussed with Consultants/Other Providers [ x] YES  [ ] NO
INTERVAL HPI/OVERNIGHT EVENTS: Seen and examined . Daughter in room . Patient refused IV lasix  drip . I just want to go home. I feel so better and will not take IV medication .   Vital Signs Last 24 Hrs  T(C): 36.8 (19 Jan 2018 22:42), Max: 36.8 (19 Jan 2018 19:30)  T(F): 98.3 (19 Jan 2018 22:42), Max: 98.3 (19 Jan 2018 22:42)  HR: 69 (20 Jan 2018 05:56) (69 - 89)  BP: 122/79 (20 Jan 2018 05:56) (110/61 - 130/95)  BP(mean): --  RR: 18 (20 Jan 2018 05:56) (18 - 18)  SpO2: 100% (20 Jan 2018 05:56) (100% - 100%)  I&O's Summary    19 Jan 2018 07:01  -  20 Jan 2018 07:00  --------------------------------------------------------  IN: 0 mL / OUT: 2400 mL / NET: -2400 mL      MEDICATIONS  (STANDING):  aspirin enteric coated 81 milliGRAM(s) Oral daily  benzonatate 100 milliGRAM(s) Oral every 8 hours  calcitriol   Capsule 0.5 MICROGram(s) Oral daily  calcium acetate 667 milliGRAM(s) Oral three times a day with meals  calcium carbonate 1250 mG + Vitamin D (OsCal 500 + D) 1 Tablet(s) Oral two times a day  carvedilol 3.125 milliGRAM(s) Oral every 12 hours  dextrose 5%. 1000 milliLiter(s) (50 mL/Hr) IV Continuous <Continuous>  dextrose 50% Injectable 12.5 Gram(s) IV Push once  dextrose 50% Injectable 25 Gram(s) IV Push once  dextrose 50% Injectable 25 Gram(s) IV Push once  furosemide Infusion 20 mG/Hr (10 mL/Hr) IV Continuous <Continuous>  heparin  Injectable 5000 Unit(s) SubCutaneous every 12 hours  insulin lispro (HumaLOG) corrective regimen sliding scale   SubCutaneous three times a day before meals  levothyroxine 150 MICROGram(s) Oral daily  magnesium oxide 400 milliGRAM(s) Oral three times a day with meals  pantoprazole    Tablet 40 milliGRAM(s) Oral before breakfast  simethicone 80 milliGRAM(s) Chew every 6 hours  spironolactone 25 milliGRAM(s) Oral two times a day  valsartan 40 milliGRAM(s) Oral two times a day    MEDICATIONS  (PRN):  aluminum hydroxide/magnesium hydroxide/simethicone Suspension 30 milliLiter(s) Oral every 4 hours PRN Dyspepsia  dextrose Gel 1 Dose(s) Oral once PRN Blood Glucose LESS THAN 70 milliGRAM(s)/deciliter  dicyclomine 10 milliGRAM(s) Oral two times a day before meals PRN abdominal pain/spasms  glucagon  Injectable 1 milliGRAM(s) IntraMuscular once PRN Glucose LESS THAN 70 milligrams/deciliter  ondansetron Injectable 4 milliGRAM(s) IV Push every 6 hours PRN Nausea and/or Vomiting    LABS:                        12.7   5.76  )-----------( 283      ( 20 Jan 2018 06:40 )             37.8     01-20    138  |  91<L>  |  24<H>  ----------------------------<  217<H>  3.6   |  30  |  1.24    Ca    7.7<L>      20 Jan 2018 09:40  Phos  5.6     01-20  Mg     1.5     01-20    TPro  7.5  /  Alb  3.9  /  TBili  1.9<H>  /  DBili  x   /  AST  82<H>  /  ALT  106<H>  /  AlkPhos  231<H>  01-20        CAPILLARY BLOOD GLUCOSE      POCT Blood Glucose.: 153 mg/dL (20 Jan 2018 12:13)  POCT Blood Glucose.: 130 mg/dL (20 Jan 2018 08:22)  POCT Blood Glucose.: 142 mg/dL (19 Jan 2018 22:08)  POCT Blood Glucose.: 122 mg/dL (19 Jan 2018 17:00)          REVIEW OF SYSTEMS:  CONSTITUTIONAL: No fever, weight loss, or fatigue  EYES: No eye pain, visual disturbances, or discharge  ENMT:  No difficulty hearing, tinnitus, vertigo; No sinus or throat pain  NECK: No pain or stiffness  BREASTS: No pain, masses, or nipple discharge  RESPIRATORY: No cough, wheezing, chills or hemoptysis; No shortness of breath  CARDIOVASCULAR: No chest pain, palpitations, dizziness, or leg swelling  GASTROINTESTINAL: No abdominal or epigastric pain. No nausea, vomiting, or hematemesis; No diarrhea or constipation. No melena or hematochezia.  GENITOURINARY: No dysuria, frequency, hematuria, or incontinence  NEUROLOGICAL: No headaches, memory loss, loss of strength, numbness, or tremors  SKIN: No itching, burning, rashes, or lesions   LYMPH NODES: No enlarged glands  ENDOCRINE: No heat or cold intolerance; No hair loss  MUSCULOSKELETAL: No joint pain or swelling; No muscle, back, or extremity pain  PSYCHIATRIC: No depression, anxiety, mood swings, or difficulty sleeping  HEME/LYMPH: No easy bruising, or bleeding gums  ALLERY AND IMMUNOLOGIC: No hives or eczema    RADIOLOGY & ADDITIONAL TESTS:    Consultant(s) Notes Reviewed:  [x ] YES  [ ] NO    PHYSICAL EXAM:  GENERAL: NAD, well-groomed, well-developed ,not in any distress ,  HEAD:  Atraumatic, Normocephalic  EYES: EOMI, PERRLA, conjunctiva and sclera clear  ENMT: No tonsillar erythema, exudates, or enlargement; Moist mucous membranes, Good dentition, No lesions  NECK: Supple, No JVD, Normal thyroid  NERVOUS SYSTEM:  Alert & Oriented X3, No focal deficit   CHEST/LUNG: Good air entry bilateral with no  rales, rhonchi, wheezing, or rubs  HEART: Regular rate and rhythm; No murmurs, rubs, or gallops  ABDOMEN: Soft, Nontender, Nondistended; Bowel sounds present  EXTREMITIES:  2+ Peripheral Pulses, No clubbing, cyanosis, or edema  SKIN: No rashes or lesions    Care Discussed with Consultants/Other Providers [ x] YES  [ ] NO
INTERVAL HPI/OVERNIGHT EVENTS: Seen and examined earlier today . Known to my service .  Vital Signs Last 24 Hrs  T(C): 36.7 (17 Jan 2018 13:59), Max: 37.1 (17 Jan 2018 10:37)  T(F): 98.1 (17 Jan 2018 13:59), Max: 98.8 (17 Jan 2018 10:37)  HR: 84 (17 Jan 2018 17:32) (62 - 86)  BP: 108/74 (17 Jan 2018 17:32) (100/65 - 115/76)  BP(mean): --  RR: 16 (17 Jan 2018 13:59) (16 - 16)  SpO2: 100% (17 Jan 2018 13:59) (98% - 100%)  I&O's Summary    17 Jan 2018 07:01  -  17 Jan 2018 19:26  --------------------------------------------------------  IN: 100 mL / OUT: 0 mL / NET: 100 mL      MEDICATIONS  (STANDING):  aspirin enteric coated 81 milliGRAM(s) Oral daily  benzonatate 100 milliGRAM(s) Oral every 8 hours  buMETAnide Injectable 1 milliGRAM(s) IV Push two times a day  calcitriol   Capsule 0.5 MICROGram(s) Oral daily  calcium acetate 667 milliGRAM(s) Oral three times a day with meals  carvedilol 3.125 milliGRAM(s) Oral every 12 hours  dextrose 5%. 1000 milliLiter(s) (50 mL/Hr) IV Continuous <Continuous>  dextrose 50% Injectable 12.5 Gram(s) IV Push once  dextrose 50% Injectable 25 Gram(s) IV Push once  dextrose 50% Injectable 25 Gram(s) IV Push once  insulin lispro (HumaLOG) corrective regimen sliding scale   SubCutaneous three times a day before meals  levothyroxine 150 MICROGram(s) Oral daily  lisinopril 10 milliGRAM(s) Oral daily  magnesium oxide 400 milliGRAM(s) Oral three times a day with meals  metolazone 5 milliGRAM(s) Oral daily  pantoprazole    Tablet 40 milliGRAM(s) Oral before breakfast  simethicone 80 milliGRAM(s) Chew every 6 hours    MEDICATIONS  (PRN):  aluminum hydroxide/magnesium hydroxide/simethicone Suspension 30 milliLiter(s) Oral every 4 hours PRN Dyspepsia  dextrose Gel 1 Dose(s) Oral once PRN Blood Glucose LESS THAN 70 milliGRAM(s)/deciliter  glucagon  Injectable 1 milliGRAM(s) IntraMuscular once PRN Glucose LESS THAN 70 milligrams/deciliter  ondansetron Injectable 4 milliGRAM(s) IV Push every 6 hours PRN Nausea and/or Vomiting    LABS:                        11.7   6.65  )-----------( 264      ( 17 Jan 2018 06:12 )             35.9     01-17    134<L>  |  91<L>  |  18  ----------------------------<  128<H>  3.9   |  26  |  1.48<H>    Ca    6.0<LL>      17 Jan 2018 06:12  Phos  5.8     01-17  Mg     1.4     01-17    TPro  6.8  /  Alb  3.6  /  TBili  1.8<H>  /  DBili  x   /  AST  83<H>  /  ALT  87<H>  /  AlkPhos  229<H>  01-17    PT/INR - ( 16 Jan 2018 18:41 )   PT: 33.6 SEC;   INR: 2.86          PTT - ( 16 Jan 2018 18:41 )  PTT:31.4 SEC    CAPILLARY BLOOD GLUCOSE      POCT Blood Glucose.: 119 mg/dL (17 Jan 2018 17:49)  POCT Blood Glucose.: 133 mg/dL (17 Jan 2018 13:01)  POCT Blood Glucose.: 129 mg/dL (17 Jan 2018 08:28)          REVIEW OF SYSTEMS:  CONSTITUTIONAL: No fever, weight loss, or fatigue  EYES: No eye pain, visual disturbances, or discharge  ENMT:  No difficulty hearing, tinnitus, vertigo; No sinus or throat pain  NECK: No pain or stiffness  BREASTS: No pain, masses, or nipple discharge  RESPIRATORY:  cough, wheezing, chills or hemoptysis; shortness of breath  CARDIOVASCULAR: No chest pain, palpitations, dizziness, or leg swelling  GASTROINTESTINAL:  abdominal or epigastric pain.  nausea, vomiting,  No diarrhea or constipation. No melena or hematochezia.  GENITOURINARY: No dysuria, frequency, hematuria, or incontinence  NEUROLOGICAL: No headaches, memory loss, loss of strength, numbness, or tremors  SKIN: No itching, burning, rashes, or lesions   LYMPH NODES: No enlarged glands  ENDOCRINE: No heat or cold intolerance; No hair loss  MUSCULOSKELETAL: No joint pain or swelling; No muscle, back, or extremity pain  PSYCHIATRIC: No depression, anxiety, mood swings, or difficulty sleeping  HEME/LYMPH: No easy bruising, or bleeding gums  ALLERY AND IMMUNOLOGIC: No hives or eczema    RADIOLOGY & ADDITIONAL TESTS:    Consultant(s) Notes Reviewed:  [x ] YES  [ ] NO    PHYSICAL EXAM:  GENERAL: NAD, Obese ,not in any distress ,  HEAD:  Atraumatic, Normocephalic  EYES: EOMI, PERRLA, conjunctiva and sclera clear  ENMT: No tonsillar erythema, exudates, or enlargement; Moist mucous membranes, Good dentition, No lesions  NECK: Supple,  JVD, Normal thyroid  NERVOUS SYSTEM:  Alert & Oriented X3, No focal deficit   CHEST/LUNG: Good air entry bilateral with no  rales, rhonchi, wheezing, or rubs  HEART: Regular rate and rhythm; No murmurs, rubs, or gallops  ABDOMEN: Soft, Nontender, Nondistended; Bowel sounds present  EXTREMITIES:  2+ Peripheral Pulses, No clubbing, cyanosis, or +edema  SKIN: No rashes or lesions    Care Discussed with Consultants/Other Providers [ x] YES  [ ] NO
NEPHR[facility] [phone]        Patient seen and examined         MEDICATIONS  (STANDING):  aspirin enteric coated 81 milliGRAM(s) Oral daily  benzonatate 100 milliGRAM(s) Oral every 8 hours  buMETAnide Injectable 1 milliGRAM(s) IV Push two times a day  calcitriol   Capsule 0.5 MICROGram(s) Oral daily  calcium acetate 667 milliGRAM(s) Oral three times a day with meals  calcium gluconate IVPB 2 Gram(s) IV Intermittent once  carvedilol 3.125 milliGRAM(s) Oral every 12 hours  dextrose 5%. 1000 milliLiter(s) (50 mL/Hr) IV Continuous <Continuous>  dextrose 50% Injectable 12.5 Gram(s) IV Push once  dextrose 50% Injectable 25 Gram(s) IV Push once  dextrose 50% Injectable 25 Gram(s) IV Push once  heparin  Injectable 5000 Unit(s) SubCutaneous every 12 hours  insulin lispro (HumaLOG) corrective regimen sliding scale   SubCutaneous three times a day before meals  levothyroxine 150 MICROGram(s) Oral daily  lisinopril 10 milliGRAM(s) Oral daily  magnesium oxide 400 milliGRAM(s) Oral three times a day with meals  metolazone 5 milliGRAM(s) Oral daily  pantoprazole    Tablet 40 milliGRAM(s) Oral before breakfast  simethicone 80 milliGRAM(s) Chew every 6 hours      VITAL:  T(C): , Max: 36.6 (01-18-18 @ 15:28)  T(F): , Max: 97.9 (01-18-18 @ 15:28)  HR: 80 (01-18-18 @ 15:28)  BP: 113/90 (01-18-18 @ 15:28)  BP(mean): --  RR: 17 (01-18-18 @ 15:28)  SpO2: 99% (01-18-18 @ 15:28)  Wt(kg): --    I and O's:    01-17 @ 07:01  -  01-18 @ 07:00  --------------------------------------------------------  IN: 100 mL / OUT: 0 mL / NET: 100 mL          PHYSICAL EXAM:    Constitutional: NAD  HEENT: PERRLA    Neck:  No JVD  Respiratory: CTAB/L  Cardiovascular: S1 and S2  Gastrointestinal: BS+, soft, NT/ND  Extremities: No peripheral edema  Neurological: A/O x 3, no focal deficits  Psychiatric: Normal mood, normal affect  : No Gustafson  Skin: No rashes  Access: Not applicable    LABS:                        11.4   6.50  )-----------( 249      ( 18 Jan 2018 05:30 )             34.3     01-18    137  |  94<L>  |  23  ----------------------------<  125<H>  4.0   |  26  |  1.44<H>    Ca    6.3<LL>      18 Jan 2018 05:30  Phos  5.8     01-17  Mg     1.4     01-17    TPro  5.9<L>  /  Alb  3.5  /  TBili  1.4<H>  /  DBili  0.9<H>  /  AST  97<H>  /  ALT  94<H>  /  AlkPhos  219<H>  01-18          Urine Studies:          RADIOLOGY & ADDITIONAL STUDIES:
Subjective:   	 no chest pain   no shortness of breath         MEDICATIONS  (STANDING):  aspirin enteric coated 81 milliGRAM(s) Oral daily  benzonatate 100 milliGRAM(s) Oral every 8 hours  calcitriol   Capsule 0.5 MICROGram(s) Oral daily  calcium acetate 667 milliGRAM(s) Oral three times a day with meals  calcium carbonate 1250 mG + Vitamin D (OsCal 500 + D) 1 Tablet(s) Oral two times a day  carvedilol 3.125 milliGRAM(s) Oral every 12 hours  dextrose 5%. 1000 milliLiter(s) (50 mL/Hr) IV Continuous <Continuous>  dextrose 50% Injectable 12.5 Gram(s) IV Push once  dextrose 50% Injectable 25 Gram(s) IV Push once  dextrose 50% Injectable 25 Gram(s) IV Push once  furosemide Infusion 20 mG/Hr (10 mL/Hr) IV Continuous <Continuous>  heparin  Injectable 5000 Unit(s) SubCutaneous every 12 hours  insulin lispro (HumaLOG) corrective regimen sliding scale   SubCutaneous three times a day before meals  levothyroxine 150 MICROGram(s) Oral daily  magnesium oxide 400 milliGRAM(s) Oral three times a day with meals  pantoprazole    Tablet 40 milliGRAM(s) Oral before breakfast  simethicone 80 milliGRAM(s) Chew every 6 hours  spironolactone 25 milliGRAM(s) Oral two times a day  valsartan 40 milliGRAM(s) Oral two times a day    MEDICATIONS  (PRN):  aluminum hydroxide/magnesium hydroxide/simethicone Suspension 30 milliLiter(s) Oral every 4 hours PRN Dyspepsia  dextrose Gel 1 Dose(s) Oral once PRN Blood Glucose LESS THAN 70 milliGRAM(s)/deciliter  dicyclomine 10 milliGRAM(s) Oral two times a day before meals PRN abdominal pain/spasms  glucagon  Injectable 1 milliGRAM(s) IntraMuscular once PRN Glucose LESS THAN 70 milligrams/deciliter  ondansetron Injectable 4 milliGRAM(s) IV Push every 6 hours PRN Nausea and/or Vomiting      LABS:                        12.7   5.76  )-----------( 283      ( 20 Jan 2018 06:40 )             37.8     Hemoglobin: 12.7 g/dL (01-20 @ 06:40)  Hemoglobin: 12.4 g/dL (01-19 @ 06:40)  Hemoglobin: 11.4 g/dL (01-18 @ 05:30)  Hemoglobin: 11.7 g/dL (01-17 @ 06:12)  Hemoglobin: 11.7 g/dL (01-16 @ 18:41)    01-20    138  |  91<L>  |  24<H>  ----------------------------<  217<H>  3.6   |  30  |  1.24    Ca    7.7<L>      20 Jan 2018 09:40  Phos  5.6     01-20  Mg     1.5     01-20    TPro  7.5  /  Alb  3.9  /  TBili  1.9<H>  /  DBili  x   /  AST  82<H>  /  ALT  106<H>  /  AlkPhos  231<H>  01-20    Creatinine Trend: 1.24<--, 1.22<--, 1.44<--, 1.48<--, 1.37<--, 1.24<--           PHYSICAL EXAM  Vital Signs Last 24 Hrs  T(C): 36.8 (19 Jan 2018 22:42), Max: 36.8 (19 Jan 2018 19:30)  T(F): 98.3 (19 Jan 2018 22:42), Max: 98.3 (19 Jan 2018 22:42)  HR: 69 (20 Jan 2018 05:56) (69 - 89)  BP: 122/79 (20 Jan 2018 05:56) (110/61 - 130/95)  BP(mean): --  RR: 18 (20 Jan 2018 05:56) (18 - 18)  SpO2: 100% (20 Jan 2018 05:56) (100% - 100%)    Cardiovascular: Normal S1 S2, RRR    No JVD, 1/6 ROSALINDA murmur, Peripheral pulses palpable 2+ bilaterally  Respiratory: Lungs clear to auscultation, normal effort 	  Gastrointestinal:  Soft, Non-tender, + BS	  Extremities no edema, cyanosis, clubbing B/L LE's       TELEMETRY: 	paced     ECG:  Atrial-sensed ventricular-paced rhythm	  RADIOLOGY:   Gastric emptying    < from: NM Gastric Empty Solid/Liquid (01.18.18 @ 14:40) >  IMPRESSION: Normal liquid and solid phase gastric emptying study.       No evidence of gastroparesis.      < end of copied text >      DIAGNOSTIC TESTING:  [ ] Echocardiogram:   < from: TTE with Doppler (w/Cont) (05.16.17 @ 09:19) >  CONCLUSIONS:  1. Mitral annular calcification, otherwise normal mitral  valve. Moderate mitral regurgitation.  2. Moderately dilated left atrium.  LA volume index = 46  cc/m2.  3. Severe left ventricular enlargement.  4. Severe global left ventricular systolic dysfunction.  Endocardial visualization enhanced with intravenous  injection of echo contrast (Definity).  5. Normal right ventricular size with decreased right  ventricular systolic function.  6. Estimated right ventricular systolic pressure equals 53  mm Hg, assuming right atrial pressure equals 10 mm Hg,  consistent with moderate pulmonary hypertension.  *** Compared with echocardiogram of 5/22/2014, no  significant changes noted.  ------------------------------------------------------------------------    < end of copied text >    [ ]  Catheterization:  [ ] Stress Test:    OTHER: 	      ASSESSMENT/PLAN: 	62y Female Cardiology ([place])- Dr Gurmeet Chavarria [phone]  63 yo F with h/o DM, HTN, HFrEF s/p ICD p/w abd pain x 1 week. Her abd pain is 10/10, intermittent, diffuse, crampy, non radiating, worse after eating and sometimes relieved after BMs. She has had associated NBNB vomiting, often provoked by coughing spells. She initially was constipated but started having diarrhea - no blood/melena was noted. She has not had any fevers or chills, no change in diet, no sick contacts, no recent travel. Of note, pt was recently hospitalized at [facility] [place] 3 weeks ago- went for similar symptoms. During hospitalization she had cardiac cath done and was on milrinone drip for about 2 days. Since discharge she has not noticed any increase in weight, LE edema is stable, no SOB, no orthopnea or PND. She has had a dry cough.    pt reports no abdominal pain no chest pain no SOB no LE edema   s/p Gastric Emptying Study w/o gastroparesis   Interrogate device   CHF   c/w lasix aldactone ASA  Coreg, valsartan    ACE to ARB per Heart Failure.  Eventual Dereck.    HF recommended Lasix gtt but pt refused     HF input appreciated   check Echo   Obtain records from Dr Chavarria   d/w Dr Rojas pt to be discharged    would send home on 40 lasix bid & aldactone ASA BB ARB   Pt to follow up with her cardiologist [facility]
Subjective:   	 no chest pain   no shortness of breath       MEDICATIONS:  MEDICATIONS  (STANDING):  aspirin enteric coated 81 milliGRAM(s) Oral daily  benzonatate 100 milliGRAM(s) Oral every 8 hours  calcitriol   Capsule 0.5 MICROGram(s) Oral daily  calcium acetate 667 milliGRAM(s) Oral three times a day with meals  calcium gluconate IVPB 2 Gram(s) IV Intermittent once  carvedilol 3.125 milliGRAM(s) Oral every 12 hours  dextrose 5%. 1000 milliLiter(s) (50 mL/Hr) IV Continuous <Continuous>  dextrose 50% Injectable 12.5 Gram(s) IV Push once  dextrose 50% Injectable 25 Gram(s) IV Push once  dextrose 50% Injectable 25 Gram(s) IV Push once  furosemide Infusion 10 mG/Hr (5 mL/Hr) IV Continuous <Continuous>  heparin  Injectable 5000 Unit(s) SubCutaneous every 12 hours  insulin lispro (HumaLOG) corrective regimen sliding scale   SubCutaneous three times a day before meals  levothyroxine 150 MICROGram(s) Oral daily  lisinopril 10 milliGRAM(s) Oral daily  magnesium oxide 400 milliGRAM(s) Oral three times a day with meals  metolazone 5 milliGRAM(s) Oral daily  pantoprazole    Tablet 40 milliGRAM(s) Oral before breakfast  simethicone 80 milliGRAM(s) Chew every 6 hours  spironolactone 12.5 milliGRAM(s) Oral daily    MEDICATIONS  (PRN):  aluminum hydroxide/magnesium hydroxide/simethicone Suspension 30 milliLiter(s) Oral every 4 hours PRN Dyspepsia  dextrose Gel 1 Dose(s) Oral once PRN Blood Glucose LESS THAN 70 milliGRAM(s)/deciliter  dicyclomine 10 milliGRAM(s) Oral two times a day before meals PRN abdominal pain/spasms  glucagon  Injectable 1 milliGRAM(s) IntraMuscular once PRN Glucose LESS THAN 70 milligrams/deciliter  ondansetron Injectable 4 milliGRAM(s) IV Push every 6 hours PRN Nausea and/or Vomiting      LABS:	 	    CARDIAC MARKERS:                                12.4   6.34  )-----------( 263      ( 19 Jan 2018 06:40 )             36.9     01-19    137  |  94<L>  |  21  ----------------------------<  140<H>  3.9   |  25  |  1.22    Ca    6.1<LL>      19 Jan 2018 06:40  Mg     1.4     01-19    TPro  7.1  /  Alb  3.7  /  TBili  1.3<H>  /  DBili  x   /  AST  114<H>  /  ALT  119<H>  /  AlkPhos  236<H>  01-19    COAGS:       proBNP:   Lipid Profile:   HgA1c:   TSH:       PHYSICAL EXAM:  T(C): 36.6 (01-19-18 @ 14:33), Max: 36.8 (01-19-18 @ 04:40)  HR: 76 (01-19-18 @ 14:33) (76 - 85)  BP: 117/79 (01-19-18 @ 14:33) (109/79 - 117/79)  RR: 18 (01-19-18 @ 14:33) (18 - 18)  SpO2: 100% (01-19-18 @ 14:33) (99% - 100%)  Wt(kg): --  I&O's Summary    18 Jan 2018 07:01  -  19 Jan 2018 07:00  --------------------------------------------------------  IN: 0 mL / OUT: 1300 mL / NET: -1300 mL    19 Jan 2018 07:01  -  19 Jan 2018 17:22  --------------------------------------------------------  IN: 0 mL / OUT: 800 mL / NET: -800 mL          	    Cardiovascular: Normal S1 S2, RRR    No JVD, 1/6 ROSALINDA murmur, Peripheral pulses palpable 2+ bilaterally  Respiratory: Lungs clear to auscultation, normal effort 	  Gastrointestinal:  Soft, Non-tender, + BS	  Extremities no edema, cyanosis, clubbing B/L LE's       TELEMETRY: 	paced     ECG:  Atrial-sensed ventricular-paced rhythm	  RADIOLOGY:   Gastric emptying    < from: NM Gastric Empty Solid/Liquid (01.18.18 @ 14:40) >  IMPRESSION: Normal liquid and solid phase gastric emptying study.       No evidence of gastroparesis.      < end of copied text >      DIAGNOSTIC TESTING:  [ ] Echocardiogram:   < from: TTE with Doppler (w/Cont) (05.16.17 @ 09:19) >  CONCLUSIONS:  1. Mitral annular calcification, otherwise normal mitral  valve. Moderate mitral regurgitation.  2. Moderately dilated left atrium.  LA volume index = 46  cc/m2.  3. Severe left ventricular enlargement.  4. Severe global left ventricular systolic dysfunction.  Endocardial visualization enhanced with intravenous  injection of echo contrast (Definity).  5. Normal right ventricular size with decreased right  ventricular systolic function.  6. Estimated right ventricular systolic pressure equals 53  mm Hg, assuming right atrial pressure equals 10 mm Hg,  consistent with moderate pulmonary hypertension.  *** Compared with echocardiogram of 5/22/2014, no  significant changes noted.  ------------------------------------------------------------------------    < end of copied text >    [ ]  Catheterization:  [ ] Stress Test:    OTHER: 	      ASSESSMENT/PLAN: 	62y Female Cardiology ([place])- Dr Gurmeet Chavarria [phone]  61 yo F with h/o DM, HTN, HFrEF s/p ICD p/w abd pain x 1 week. Her abd pain is 10/10, intermittent, diffuse, crampy, non radiating, worse after eating and sometimes relieved after BMs. She has had associated NBNB vomiting, often provoked by coughing spells. She initially was constipated but started having diarrhea - no blood/melena was noted. She has not had any fevers or chills, no change in diet, no sick contacts, no recent travel. Of note, pt was recently hospitalized at [place] 3 weeks ago- went for similar symptoms. During hospitalization she had cardiac cath done and was on milrinone drip for about 2 days. Since discharge she has not noticed any increase in weight, LE edema is stable, no SOB, no orthopnea or PND. She has had a dry cough.    pt reports no abdominal pain no chest pain no SOB no LE edema   s/p Gastric Emptying Study w/o gastroparesis   Interrogate device   CHF   c/w lasix aldactone ASA  Coreg, valsartan    ACE to ARB per Heart Failure.  Eventual Dereck.    HF input appreciated   check Echo   Obtain records from Dr Chavarria

## 2018-01-20 NOTE — DISCHARGE NOTE ADULT - ADDITIONAL INSTRUCTIONS
Repeat chemistry (CMP) with your PCP or Dr. Wolfe (renal) within 3 days of discharge.   Follow up with your heart failure cardiologist as soon as possible at [place]. Repeat chemistry (CMP) with your nephrologist or Dr. Wolfe within 3 days of discharge.   Follow up with your heart failure cardiologist as soon as possible at [place].

## 2018-01-20 NOTE — DISCHARGE NOTE ADULT - PATIENT PORTAL LINK FT
“You can access the Follow[employer] Patient Portal, offered by [facility], by registering with the following website: [email or web address]”

## 2018-01-20 NOTE — DISCHARGE NOTE ADULT - CARE PLAN
Principal Discharge DX:	Abdominal pain  Goal:	Your gastric emptying study was unremarkable.  Assessment and plan of treatment:	Follow up with your primary care physician for further monitoring in 1-2 weeks. Please call to arrange appointment.  Secondary Diagnosis:	Acute on chronic systolic heart failure  Assessment and plan of treatment:	Low salt intake. Restrict fluid intake based on your doctors recommendations. Monitor daily weights. If you note weight gain >3-4lbs in one week, follow up with your doctor or return to the hospital immediately. Follow up with your cardiologist at [place] within 1 week of discharge, arrange an appointment for as soon as possible. Principal Discharge DX:	Abdominal pain  Goal:	Your gastric emptying study was unremarkable. Continue your medications for symptom relief.  Assessment and plan of treatment:	Follow up with your gastroenterologist for further monitoring in 1 week. Please call to arrange appointment.  Secondary Diagnosis:	Acute on chronic systolic heart failure  Goal:	Continue Lasix and Aldactone as directed.  Assessment and plan of treatment:	Low salt intake. Restrict fluid intake based on your doctors recommendations. Monitor daily weights. If you note weight gain >3-4lbs in one week, follow up with your doctor or return to the hospital immediately. Follow up with your cardiologist at [place] within 1 week of discharge, arrange an appointment for as soon as possible.  Secondary Diagnosis:	DM (diabetes mellitus)  Goal:	Continue Tradjenta.  Assessment and plan of treatment:	Follow up with your primary care physician for further monitoring in 1-2 weeks. Please call to arrange appointment. Continue diet modification. Avoid complex carbohydrates such as bread, pasta, cereal, white rice, white potatoes, etc. Avoid concentrated sugar as found in desserts, candy, soda, juice, etc. Consume a diet based on lean protein (chicken, fish) and vegetables.  Secondary Diagnosis:	HTN (hypertension)  Goal:	Continue current medications as directed, goal /80mmhg.  Assessment and plan of treatment:	Follow up with your primary care physician for further monitoring in 1-2 weeks. Please call to arrange appointment.  Secondary Diagnosis:	Hypothyroidism, unspecified type  Goal:	Continue Synthroid daily.  Assessment and plan of treatment:	Follow up with your primary care physician for further monitoring in 1-2 weeks. Please call to arrange appointment.  Secondary Diagnosis:	Hypocalcemia  Goal:	Continue your medications as directed.  Assessment and plan of treatment:	Follow up with your nephrologist Dr. Wolfe for further monitoring in 1-2 weeks. Please call to arrange appointment.  Secondary Diagnosis:	Hyperphosphatemia  Goal:	Continue Phoslo with meals.  Assessment and plan of treatment:	Follow up with your nephrologist Dr. Wolfe for further monitoring in 1-2 weeks. Please call to arrange appointment. Principal Discharge DX:	Abdominal pain  Goal:	Your gastric emptying study was unremarkable. Continue your medications for symptom relief.  Assessment and plan of treatment:	Follow up with your gastroenterologist for further monitoring in 1 week. Please call to arrange appointment.  Secondary Diagnosis:	Acute on chronic systolic heart failure  Goal:	Continue Lasix, Aldactone, Valsartan as directed.  Assessment and plan of treatment:	Low salt intake. Restrict fluid intake based on your doctors recommendations. Monitor daily weights. If you note weight gain >3-4lbs in one week, follow up with your doctor or return to the hospital immediately. Follow up with your cardiologist at [place] within 1 week of discharge, arrange an appointment for as soon as possible.  Secondary Diagnosis:	DM (diabetes mellitus)  Goal:	Continue Tradjenta as you were previously taking at home.  Assessment and plan of treatment:	Follow up with your primary care physician for further monitoring in 1-2 weeks. Please call to arrange appointment. Continue diet modification. Avoid complex carbohydrates such as bread, pasta, cereal, white rice, white potatoes, etc. Avoid concentrated sugar as found in desserts, candy, soda, juice, etc. Consume a diet based on lean protein (chicken, fish) and vegetables.  Secondary Diagnosis:	HTN (hypertension)  Goal:	Continue current medications as directed, goal /80mmhg.  Assessment and plan of treatment:	Follow up with your primary care physician for further monitoring in 1-2 weeks. Please call to arrange appointment.  Secondary Diagnosis:	Hypothyroidism, unspecified type  Goal:	Continue Synthroid daily.  Assessment and plan of treatment:	Follow up with your primary care physician for further monitoring in 1-2 weeks. Please call to arrange appointment.  Secondary Diagnosis:	Hypocalcemia  Goal:	Continue your medications as directed.  Assessment and plan of treatment:	Follow up with your nephrologist Dr. Wolfe for further monitoring in in 1 week Please call to arrange appointment.  Secondary Diagnosis:	Hyperphosphatemia  Goal:	Continue Phoslo with meals.  Assessment and plan of treatment:	Follow up with your nephrologist Dr. Wolfe for further monitoring in 1 week. Please call to arrange appointment.

## 2018-01-20 NOTE — PROGRESS NOTE ADULT - ASSESSMENT
The patient is a 62-year-old female with a past medical history of nonischemic cardiomyopathy, hypertension, diabetes, and chronic kidney disease, stage III, who presents with severe abdominal pain.  The patient was found to have hepatitis that does not seem to be related to gallstones.  Unclear if this is hepatic congestion from decompensated heart failure.  She clearly has JVD sitting up.   Patient also with evidence of hypocalcemia and hyperphosphatemia.  This may be from hypoparathyroidism.  She also has evidence of hypomagnesemia.       1.	Renal.     PhosLo for the hyperphosphatemia.  Start Oscal with Vit D for tomorrow.   Will give another dose of IV calcium as well--2 grams.(already got 2 doses earlier today); Resolved MAGNUS  2.	Cardiology. Change ACE to ARB per cards.  Eventual Dereck.  3.	Gastrointestinal.  Trend LFT.  Gi follow up
61 yo F with h/o DM, HTN, HFrEF s/p ICD p/w abd pain x 1 week. Her abd pain is 10/10, intermittent, diffuse, crampy, non radiating, worse after eating and sometimes relieved after BMs. She has had associated NBNB vomiting, often provoked by coughing spells. She initially was constipated but started having diarrhea yesterday- no blood/melena was noted. She has not had any fevers or chills, no change in diet, no sick contacts, no recent travel. Of note, pt was recently hospitalized at [place] 3 weeks ago- went for similar symptoms. During hospitalization she had cardiac cath done and was on milrinone drip for about 2 days. Since discharge she has not noticed any increase in weight, LE edema is stable, no SOB, no orthopnea or PND. She has had a dry cough.    ED VS:  134/ 98  84  97.5  10  100% on RA  Pt was given calcium gluconate 2g IV for hypocalcemia and zofran 4mg IV     Problem/Plan - 1:  ·  Problem: Acute on Chronic systolic congestive heart failure with CMP S/P AICD .  Plan: - breathing better. Started on IV Bumex and Zaroxolyn . Will Cardiology and  Heart Failure team consulted.      Problem/Plan - 2:   ·  Problem: Generalized abdominal pain.  Plan: - Gastric Emptying study negative for gastroparesis.  Likely from CHF . S/P US abdomen and CT abdomen . GI consult and surgery consults  noted.    Problem/Plan - 3:  ·  Problem: Essential hypertension.  Plan: - BP readings fine .   - Continue home meds.      Problem/Plan - 4:  ·  Problem: Hypocalcemia :   Plan: - Replacing. Renal help appreciated.      Problem/Plan - 5:  ·  Problem: Hypothyroidism, unspecified type.  Plan: - h/o thyroid CA s/p thyroidectomy  - Continue synthroid.  TFT WNL.    Problem/Plan - 6:  ·  Problem: CKD .  Plan: - Renal consulted . Creatinine stable.    Problem/Plan - 7:  ·  Problem: Type 2 diabetes mellitus without complication, without long-term current use of insulin.  Plan: - Sugars in good range .Monitor finger sticks  - Sliding scale insulin.    Problem/Plan - 8:  ·  Problem: Abnormal LFT .  Plan: - Secondary to Hepatic Congestion . Stable.    Problem/Plan - 9:  ·  Problem: DVT Prophylaxis   Plan: - Heparin
61 yo F with h/o DM, HTN, HFrEF s/p ICD p/w abd pain x 1 week. Her abd pain is 10/10, intermittent, diffuse, crampy, non radiating, worse after eating and sometimes relieved after BMs. She has had associated NBNB vomiting, often provoked by coughing spells. She initially was constipated but started having diarrhea yesterday- no blood/melena was noted. She has not had any fevers or chills, no change in diet, no sick contacts, no recent travel. Of note, pt was recently hospitalized at [place] 3 weeks ago- went for similar symptoms. During hospitalization she had cardiac cath done and was on milrinone drip for about 2 days. Since discharge she has not noticed any increase in weight, LE edema is stable, no SOB, no orthopnea or PND. She has had a dry cough.    ED VS:  134/ 98  84  97.5  10  100% on RA  Pt was given calcium gluconate 2g IV for hypocalcemia and zofran 4mg IV     Problem/Plan - 1:  ·  Problem: Acute on Chronic systolic congestive heart failure with CMP S/P AICD .  Plan: - Started on IV Bumex and Zaroxolyn . Will Consult CHF team.      Problem/Plan - 2:   ·  Problem: Generalized abdominal pain.  Plan: - Likely from CHF . S/P US abdomen and CT abdomen . GI consult and surgery consults  noted.    Problem/Plan - 3:  ·  Problem: Essential hypertension.  Plan: - BP readings fine .   - Continue home meds.      Problem/Plan - 4:  ·  Problem: Hypocalcemia :   Plan: - Replacing. Renal help appreciated.      Problem/Plan - 5:  ·  Problem: Hypothyroidism, unspecified type.  Plan: - h/o thyroid CA s/p thyroidectomy  - Continue synthroid. Will check TFT    Problem/Plan - 6:  ·  Problem: CKD .  Plan: - Renal consulted .    Problem/Plan - 7:  ·  Problem: Type 2 diabetes mellitus without complication, without long-term current use of insulin.  Plan: - Sugars in good range .Monitor finger sticks  - Sliding scale insulin.    Problem/Plan - 4:  ·  Problem: Abnormal LFT .  Plan: - Secondary to Hepatic Congestion .    Problem/Plan - 4:  ·  Problem: DVT Prophylaxis   Plan: - Heparin
61yo female h/o DM2 (A1C 7.5 5/17), CHFrEF,  thyroid CA s/p thyroidectomy c/b persistent hypocalcemia, hypertension, hyperlipidemia, chronic RBBB,  obesity, thyroid CA, history of NICM s/p ICD 6/2017 p/w abdominal pain. Pt is knows to our service from previous admission in May 2017 for persistent abdominal pain with nausea and found to have transaminitis. During that admission she underwent EGD with esophagitis and duodenopathy and MRCP was also obtained and did not show any evidence of biliary obstruction at that time. Further imaging with CT Abd showed 'Contracted gallbladder with intraluminal stones. Nonspecific gallbladder wall thickening or edema. Appearance is unchanged from the prior recent CT and MRI examinations.
63 yo F with h/o DM, HTN, HFrEF s/p ICD p/w abd pain x 1 week. Her abd pain is 10/10, intermittent, diffuse, crampy, non radiating, worse after eating and sometimes relieved after BMs. She has had associated NBNB vomiting, often provoked by coughing spells. She initially was constipated but started having diarrhea yesterday- no blood/melena was noted. She has not had any fevers or chills, no change in diet, no sick contacts, no recent travel. Of note, pt was recently hospitalized at [place] 3 weeks ago- went for similar symptoms. During hospitalization she had cardiac cath done and was on milrinone drip for about 2 days. Since discharge she has not noticed any increase in weight, LE edema is stable, no SOB, no orthopnea or PND. She has had a dry cough.    ED VS:  134/ 98  84  97.5  10  100% on RA  Pt was given calcium gluconate 2g IV for hypocalcemia and zofran 4mg IV     Problem/Plan - 1:  ·  Problem: Acute on Chronic systolic congestive heart failure with CMP S/P AICD .  Plan: - breathing better. Started on IV Bumex and now on Lasix ggt  .  Cardiology and  Heart Failure team helping.  Awaiting ICD Interrogation      Problem/Plan - 2:   ·  Problem: Generalized abdominal pain.  Plan: - Gastric Emptying study negative for gastroparesis.  Likely from CHF . S/P US abdomen and CT abdomen . GI consult and surgery consults  noted.    Problem/Plan - 3:  ·  Problem: Essential hypertension.  Plan: - BP readings fine .   - Continue home meds.      Problem/Plan - 4:  ·  Problem: Hypocalcemia :   Plan: - Replacing. Renal help appreciated.      Problem/Plan - 5:  ·  Problem: Hypothyroidism, unspecified type.  Plan: - h/o thyroid CA s/p thyroidectomy  - Continue synthroid.  TFT WNL.    Problem/Plan - 6:  ·  Problem: CKD .  Plan: - Renal consulted . Creatinine better .     Problem/Plan - 7:  ·  Problem: Type 2 diabetes mellitus without complication, without long-term current use of insulin.  Plan: - Sugars in good range .Monitor finger sticks  - Sliding scale insulin.    Problem/Plan - 8:  ·  Problem: Abnormal LFT .  Plan: - Secondary to Hepatic Congestion . Stable.    Problem/Plan - 9:  ·  Problem: DVT Prophylaxis   Plan: - Heparin
63 yo F with h/o DM, HTN, HFrEF s/p ICD p/w abd pain x 1 week. Her abd pain is 10/10, intermittent, diffuse, crampy, non radiating, worse after eating and sometimes relieved after BMs. She has had associated NBNB vomiting, often provoked by coughing spells. She initially was constipated but started having diarrhea yesterday- no blood/melena was noted. She has not had any fevers or chills, no change in diet, no sick contacts, no recent travel. Of note, pt was recently hospitalized at [place] 3 weeks ago- went for similar symptoms. During hospitalization she had cardiac cath done and was on milrinone drip for about 2 days. Since discharge she has not noticed any increase in weight, LE edema is stable, no SOB, no orthopnea or PND. She has had a dry cough.    ED VS:  134/ 98  84  97.5  10  100% on RA  Pt was given calcium gluconate 2g IV for hypocalcemia and zofran 4mg IV     Problem/Plan - 1:  ·  Problem: Acute on Chronic systolic congestive heart failure with CMP S/P AICD .  Plan: - Breathing better. Refusing IV Laix etc . She wants to go back to [place] and continue Rx there . Cardiology and  Heart Failure team helping.  Awaiting ICD Interrogation      Problem/Plan - 2:   ·  Problem: Generalized abdominal pain.  Plan: - Gastric Emptying study negative for gastroparesis.  Likely from CHF . S/P US abdomen and CT abdomen . GI consult and surgery consults  noted.    Problem/Plan - 3:  ·  Problem: Essential hypertension.  Plan: - BP readings fine .   - Continue home meds.      Problem/Plan - 4:  ·  Problem: Hypocalcemia :   Plan: - Replacing. Renal help appreciated.      Problem/Plan - 5:  ·  Problem: Hypothyroidism, unspecified type.  Plan: - h/o thyroid CA s/p thyroidectomy  - Continue synthroid.  TFT WNL.    Problem/Plan - 6:  ·  Problem: CKD .  Plan: - Renal helping  . Creatinine better .     Problem/Plan - 7:  ·  Problem: Type 2 diabetes mellitus without complication, without long-term current use of insulin.  Plan: - Sugars in good range .Monitor finger sticks  - Sliding scale insulin.    Problem/Plan - 8:  ·  Problem: Abnormal LFT .  Plan: - Secondary to Hepatic Congestion . Stable.    Problem/Plan - 9:  ·  Problem: DVT Prophylaxis   Plan: - Heparin       Disposition : D/W pt and her daughter about gong home today and refusing IV Diuretics. explaine dto them that her CHf is getting better but needs to continue IV meds. Risk including  worsening of CHF including death explained . Said I have F/u appointment on Monday at [place].
The patient is a 62-year-old female with a past medical history of nonischemic cardiomyopathy, hypertension, diabetes, and chronic kidney disease, stage III, who presents with severe abdominal pain.  The patient was found to have hepatitis that does not seem to be related to gallstones.  Unclear if this is hepatic congestion from decompensated heart failure.  She clearly has JVD sitting up.   Patient also with evidence of hypocalcemia and hyperphosphatemia.  This may be from hypoparathyroidism.  She also has evidence of hypomagnesemia.       1.	Renal.  She never got one dose of IV Bumex due to hold parameters!     No need for renal sonogram at present.   PhosLo for the hyperphosphatemia.  Will give another dose of IV calcium as well--2 grams.  2.	Cardiology.  No reason to stop the Lisinopril.  Will check a BNP with the next blood draw.  She may benefit from a heart failure consult.  3.	Gastrointestinal.  Trend LFT.  Gi follow up

## 2018-01-20 NOTE — DISCHARGE NOTE ADULT - CARE PROVIDER_API CALL
Abbi Wolfe), Internal Medicine; Nephrology  [address]  [address]  [place] [address]  Phone: [phone]  Fax: [phone]    Tiara Rojas), Internal Medicine  [address] [place] [id number]  Phone: [phone]  Fax: [phone]

## 2018-01-20 NOTE — DISCHARGE NOTE ADULT - PLAN OF CARE
Your gastric emptying study was unremarkable. Follow up with your primary care physician for further monitoring in 1-2 weeks. Please call to arrange appointment. Low salt intake. Restrict fluid intake based on your doctors recommendations. Monitor daily weights. If you note weight gain >3-4lbs in one week, follow up with your doctor or return to the hospital immediately. Follow up with your cardiologist at [place] within 1 week of discharge, arrange an appointment for as soon as possible. Your gastric emptying study was unremarkable. Continue your medications for symptom relief. Follow up with your gastroenterologist for further monitoring in 1 week. Please call to arrange appointment. Continue Lasix and Aldactone as directed. Continue Tradjenta. Follow up with your primary care physician for further monitoring in 1-2 weeks. Please call to arrange appointment. Continue diet modification. Avoid complex carbohydrates such as bread, pasta, cereal, white rice, white potatoes, etc. Avoid concentrated sugar as found in desserts, candy, soda, juice, etc. Consume a diet based on lean protein (chicken, fish) and vegetables. Continue current medications as directed, goal /80mmhg. Continue Synthroid daily. Continue your medications as directed. Follow up with your nephrologist Dr. Wolfe for further monitoring in 1-2 weeks. Please call to arrange appointment. Continue Phoslo with meals. Continue Lasix, Aldactone, Valsartan as directed. Continue Tradjenta as you were previously taking at home. Follow up with your nephrologist Dr. Wolfe for further monitoring in in 1 week Please call to arrange appointment. Follow up with your nephrologist Dr. Wolfe for further monitoring in 1 week. Please call to arrange appointment.

## 2018-01-20 NOTE — DISCHARGE NOTE ADULT - HOSPITAL COURSE
63 yo F with h/o DM, HTN, HFrEF s/p ICD p/w abd pain x 1 week. Her abd pain is 10/10, intermittent, diffuse, crampy, non radiating, worse after eating and sometimes relieved after BMs. She has had associated NBNB vomiting, often provoked by coughing spells. She initially was constipated but started having diarrhea yesterday- no blood/melena was noted. She has not had any fevers or chills, no change in diet, no sick contacts, no recent travel. Of note, pt was recently hospitalized at [facility] 3 weeks ago- went for similar symptoms. During hospitalization she had cardiac cath done and was on milrinone drip for about 2 days. Since discharge she has not noticed any increase in weight, LE edema is stable, no SOB, no orthopnea or PND.    May 2017, Echo: EF 22%. Severe left ventricular enlargement. Severe global left ventricular systolic dysfunction. Normal right ventricular size with decreased right ventricular systolic function. Moderate pulmonary hypertension.    On admission: EKG: NSR. Na: 133. BUN/Cr: 16/1.37. Ca: 5.7. Bili: 2.2. Alk phos: 238. AST/ALT: 88/89. Gastric empty studying: Normal liquid and solid phase gastric emptying study. No evidence of gastroparesis.     Surgery consult: Abdominal pain of unclear etiology. Unlikely related to gallbladder given no inflammatory signs seen on US and no focal tenderness on physical exam. Favor etiology related to CHF causing hepatic congestion. Pt was undergoing workup by GI in June 2017 before pt left AMA. Would recommend continuing workup including UGI to assess for gastroparesis (pt with poorly controlled DM). No acute surgical intervention at this time.     GI consult: Generalized abdominal pain: - likely related to Hepatic Congestion from RHF - Bentyl QD - ppi qd - cont simethicone q6h. Nausea and vomiting: - zofran prn- diet as tolerated- avoid reglan and opioids for gastric emptying study -- > pending- low fat diet after GES. Chronic systolic congestive heart failure: - s/p ICD in June 2017. Monitor I&Os- low sodium diet. Transaminitis: avoid hepatotoxins- elevated lft's/bili & abdominal pain likely d/t Hepatic Congestion 2/2 RHF- patient refused US Abdomen.    Renal: She never got one dose of IV Bumex due to hold parameters!  No need for renal sonogram at present. PhosLo for the hyperphosphatemia.  Will give another dose of IV calcium as well--2 grams. Cardiology.  No reason to stop the Lisinopril.  Will check a BNP with the next blood draw.  She may benefit from a heart failure consult. Gastrointestinal.  Trend LFT.  Gi follow up     HF c/s: After her next dose of Bumex 1 mg IV, please start a Lasix infusion of 20 mg/hr (double concentrated) for maximal diuresis. Add spironolactone 25 mg po BID to prevent hypokalemia. If morning K > 4.5, ok to hold afternoon dose of spironolactone. Start valsartan 40 mg po BID and increase to 80 mg po BID if SBP > 100 on Saturday. Similarly, if SBP > 100 on Sunday, please further increase to 120 mg po BID on Sunday. Please do not give metolazone at this time. Dr. Wolfe will closely monitor her calcium and treat, as needed.     Renal: Start Oscal with Vit D for tomorrow. Will give another dose of IV calcium as well--2 grams.(already got 2 doses earlier today); Resolved MAGNUS.     Pt refusing lasix gtt, does not want to comply with our heart failure teams recommendations, would prefer to follow up with HF doctor at [facility].     **********incomplete 61 yo F with h/o DM, HTN, HFrEF s/p ICD p/w abd pain x 1 week. Her abd pain is 10/10, intermittent, diffuse, crampy, non radiating, worse after eating and sometimes relieved after BMs. She has had associated NBNB vomiting, often provoked by coughing spells. She initially was constipated but started having diarrhea yesterday- no blood/melena was noted. She has not had any fevers or chills, no change in diet, no sick contacts, no recent travel. Of note, pt was recently hospitalized at [facility] 3 weeks ago- went for similar symptoms. During hospitalization she had cardiac cath done and was on milrinone drip for about 2 days. Since discharge she has not noticed any increase in weight, LE edema is stable, no SOB, no orthopnea or PND.    May 2017, Echo: EF 22%. Severe left ventricular enlargement. Severe global left ventricular systolic dysfunction. Normal right ventricular size with decreased right ventricular systolic function. Moderate pulmonary hypertension.    On admission: EKG: NSR. Na: 133. BUN/Cr: 16/1.37. Ca: 5.7. Bili: 2.2. Alk phos: 238. AST/ALT: 88/89. Gastric empty studying: Normal liquid and solid phase gastric emptying study. No evidence of gastroparesis.     Surgery consult: Abdominal pain of unclear etiology. Unlikely related to gallbladder given no inflammatory signs seen on US and no focal tenderness on physical exam. Favor etiology related to CHF causing hepatic congestion. Pt was undergoing workup by GI in June 2017 before pt left AMA. Would recommend continuing workup including UGI to assess for gastroparesis (pt with poorly controlled DM). No acute surgical intervention at this time.     GI consult: Generalized abdominal pain: - likely related to Hepatic Congestion from RHF - Bentyl QD - ppi qd - cont simethicone q6h. Nausea and vomiting: - zofran prn- diet as tolerated- avoid reglan and opioids for gastric emptying study -- > pending- low fat diet after GES. Chronic systolic congestive heart failure: - s/p ICD in June 2017. Monitor I&Os- low sodium diet. Transaminitis: avoid hepatotoxins- elevated lft's/bili & abdominal pain likely d/t Hepatic Congestion 2/2 RHF- patient refused US Abdomen.    Renal: She never got one dose of IV Bumex due to hold parameters!  No need for renal sonogram at present. PhosLo for the hyperphosphatemia.  Will give another dose of IV calcium as well--2 grams. Cardiology.  No reason to stop the Lisinopril.  Will check a BNP with the next blood draw.  She may benefit from a heart failure consult. Gastrointestinal.  Trend LFT.  Gi follow up     HF c/s: After her next dose of Bumex 1 mg IV, please start a Lasix infusion of 20 mg/hr (double concentrated) for maximal diuresis. Add spironolactone 25 mg po BID to prevent hypokalemia. If morning K > 4.5, ok to hold afternoon dose of spironolactone. Start valsartan 40 mg po BID and increase to 80 mg po BID if SBP > 100 on Saturday. Similarly, if SBP > 100 on Sunday, please further increase to 120 mg po BID on Sunday. Please do not give metolazone at this time. Dr. Wolfe will closely monitor her calcium and treat, as needed.     Renal: Start Oscal with Vit D for tomorrow. Will give another dose of IV calcium as well--2 grams.(already got 2 doses earlier today); Resolved MAGNUS.     Pt refusing lasix gtt, does not want to comply with our heart failure teams recommendations, would prefer to follow up with HF doctor at [facility]. Risks of leaving without appropriate treatment of heart failure and electrolyte abnormalities reviewed including pulmonary edema, SOB, MI, potentially death, but understands and states she has an appt with her cardiologist at [facility].     Case discussed with Dr. Rojas, labs/vitals/medications reviewed, Pt medically cleared for discharge to home with outpt cardiology follow up as soon as possible. 63 yo F with h/o DM, HTN, HFrEF s/p ICD p/w abd pain x 1 week. Her abd pain is 10/10, intermittent, diffuse, crampy, non radiating, worse after eating and sometimes relieved after BMs. She has had associated NBNB vomiting, often provoked by coughing spells. She initially was constipated but started having diarrhea yesterday- no blood/melena was noted. She has not had any fevers or chills, no change in diet, no sick contacts, no recent travel. Of note, pt was recently hospitalized at [facility] 3 weeks ago- went for similar symptoms. During hospitalization she had cardiac cath done and was on milrinone drip for about 2 days. Since discharge she has not noticed any increase in weight, LE edema is stable, no SOB, no orthopnea or PND.    May 2017, Echo: EF 22%. Severe left ventricular enlargement. Severe global left ventricular systolic dysfunction. Normal right ventricular size with decreased right ventricular systolic function. Moderate pulmonary hypertension.    On admission: EKG: NSR. Na: 133. BUN/Cr: 16/1.37. Ca: 5.7. Bili: 2.2. Alk phos: 238. AST/ALT: 88/89. Gastric empty studying: Normal liquid and solid phase gastric emptying study. No evidence of gastroparesis.     Surgery consult: Abdominal pain of unclear etiology. Unlikely related to gallbladder given no inflammatory signs seen on US and no focal tenderness on physical exam. Favor etiology related to CHF causing hepatic congestion. Pt was undergoing workup by GI in June 2017 before pt left AMA. Would recommend continuing workup including UGI to assess for gastroparesis (pt with poorly controlled DM). No acute surgical intervention at this time.     GI consult: Generalized abdominal pain: - likely related to Hepatic Congestion from RHF - Bentyl QD - ppi qd - cont simethicone q6h. Nausea and vomiting: - zofran prn- diet as tolerated- avoid reglan and opioids for gastric emptying study -- > pending- low fat diet after GES. Chronic systolic congestive heart failure: - s/p ICD in June 2017. Monitor I&Os- low sodium diet. Transaminitis: avoid hepatotoxins- elevated lft's/bili & abdominal pain likely d/t Hepatic Congestion 2/2 RHF- patient refused US Abdomen.    Renal: She never got one dose of IV Bumex due to hold parameters!  No need for renal sonogram at present. PhosLo for the hyperphosphatemia.  Will give another dose of IV calcium as well--2 grams. Cardiology.  No reason to stop the Lisinopril.  Will check a BNP with the next blood draw.  She may benefit from a heart failure consult. Gastrointestinal.  Trend LFT.  Gi follow up     HF c/s: After her next dose of Bumex 1 mg IV, please start a Lasix infusion of 20 mg/hr (double concentrated) for maximal diuresis. Add spironolactone 25 mg po BID to prevent hypokalemia. If morning K > 4.5, ok to hold afternoon dose of spironolactone. Start valsartan 40 mg po BID and increase to 80 mg po BID if SBP > 100 on Saturday. Similarly, if SBP > 100 on Sunday, please further increase to 120 mg po BID on Sunday. Please do not give metolazone at this time. Dr. Wolfe will closely monitor her calcium and treat, as needed.     Renal: Start Oscal with Vit D for tomorrow. Will give another dose of IV calcium as well--2 grams.(already got 2 doses earlier today); Resolved MAGNUS.     Pt refusing lasix gtt, does not want to comply with our heart failure teams recommendations, would prefer to follow up with HF doctor at [facility]. Risks of leaving without appropriate treatment of heart failure and electrolyte abnormalities reviewed including pulmonary edema, SOB, MI, potentially death, but understands and states she has an appt with her cardiologist at [facility].     Case discussed with Dr. Rojas, labs/vitals/medications reviewed, Pt medically cleared for discharge to home with outpt cardiology follow up as soon as possible, states she has HF appointment on Monday and renal appt on Thursday at [facility].

## 2018-01-20 NOTE — DISCHARGE NOTE ADULT - MEDICATION SUMMARY - MEDICATIONS TO TAKE
I will START or STAY ON the medications listed below when I get home from the hospital:    spironolactone 25 mg oral tablet  -- 1 tab(s) by mouth 2 times a day  -- Indication: For Heart Failure    aspirin 81 mg oral delayed release tablet  -- 1 tab(s) by mouth once a day  -- Indication: For Heart Health    valsartan 40 mg oral tablet  -- 1 tab(s) by mouth 2 times a day  -- Indication: For Heart Failure, Blood pressure    Tradjenta 5 mg oral tablet  -- 1 tab(s) by mouth once a day  -- Indication: For Diabetes mellitus    Zofran ODT 4 mg oral tablet, disintegrating  -- 1 tab(s) by mouth 2 times a day, As Needed  for nausea  -- Indication: For Nausea and vomiting    carvedilol 3.125 mg oral tablet  -- 1 tab(s) by mouth every 12 hours  -- Indication: For Heart Failure, blood pressure    furosemide 40 mg oral tablet  -- 1 tab(s) by mouth 2 times a day   -- Avoid prolonged or excessive exposure to direct and/or artificial sunlight while taking this medication.  It is very important that you take or use this exactly as directed.  Do not skip doses or discontinue unless directed by your doctor.  It may be advisable to drink a full glass orange juice or eat a banana daily while taking this medication.    -- Indication: For Heart Failure    dicyclomine 10 mg oral capsule  -- 1 cap(s) by mouth 2 times a day (before meals), As needed, abdominal pain/spasms  -- Indication: For Abdominal pain    magnesium oxide 400 mg (241.3 mg elemental magnesium) oral tablet  -- 1 tab(s) by mouth 3 times a day (with meals)  -- Indication: For Low magnesium    sucralfate 1 g oral tablet  -- 1 tab(s) by mouth 2 times a day  -- Indication: For Abdominal pain/Acid reflux    simethicone 80 mg oral tablet, chewable  -- 1 tab(s) by mouth every 6 hours  -- Indication: For Abdominal pain    calcium acetate 667 mg oral tablet  -- 1 tab(s) by mouth 3 times a day (with meals)   -- Indication: For High phosphorus    pantoprazole 40 mg oral delayed release tablet  -- 1 tab(s) by mouth once a day (before a meal)  -- Indication: For Acid reflux    levothyroxine 150 mcg (0.15 mg) oral tablet  -- 1 tab(s) by mouth once a day  -- Indication: For Hypothyroidism, unspecified type    calcium-vitamin D 500 mg-200 intl units oral tablet  -- 1 tab(s) by mouth 2 times a day  -- Indication: For Low calcium    calcitriol 0.5 mcg oral capsule  -- 1 cap(s) by mouth once a day  -- Indication: For Low calcium

## 2018-01-20 NOTE — PROGRESS NOTE ADULT - ATTENDING COMMENTS
Agree with above.   -Pt. volume overloaded  -Lasix gtt per CHF to keep O > I    Kunal Muller MD  [facility]  [address], [address]  [place] [address]  office: [phone]  pager: [phone]
Patient seen and examined, agree with above assessment and plan as transcribed above.    - D/C planning per primary team  - Patient wishes to f/u with hr primary cardio next week    Augusto Grimes MD, [facility], [facility]  [address] [place] [address]  PHONE:  [phone]  BEEPER : [phone]
dc planning per primary team

## 2018-01-20 NOTE — PROGRESS NOTE ADULT - PROVIDER SPECIALTY LIST ADULT
Cardiology
Cardiology
Gastroenterology
Internal Medicine
Nephrology
Nephrology

## 2018-01-20 NOTE — DISCHARGE NOTE ADULT - MEDICATION SUMMARY - MEDICATIONS TO STOP TAKING
I will STOP taking the medications listed below when I get home from the hospital:    ergocalciferol  -- 41431 unit(s) by mouth every 7 days; take on Tuesdays at 6pm

## 2018-01-20 NOTE — DISCHARGE NOTE ADULT - SECONDARY DIAGNOSIS.
Acute on chronic systolic heart failure DM (diabetes mellitus) HTN (hypertension) Hypothyroidism, unspecified type Hypocalcemia Hyperphosphatemia

## 2018-03-06 NOTE — PROGRESS NOTE ADULT - PROBLEM/PLAN-4
Aortic valve prosthesis present    Arthritis    CAD (coronary artery disease)    Clostridium difficile colitis    Emphysema    Generalized intra-abdominal and pelvic swelling, mass and lump    Glaucoma    Heart murmur    Hyperlipemia    Hypertension    Hypothyroid    Other iron deficiency anemia    Pneumonia  2/2018  PVD (peripheral vascular disease) DISPLAY PLAN FREE TEXT

## 2018-04-06 ENCOUNTER — RX RENEWAL (OUTPATIENT)
Age: 63
End: 2018-04-06

## 2018-04-18 ENCOUNTER — INPATIENT (INPATIENT)
Facility: HOSPITAL | Age: 63
LOS: 7 days | Discharge: HOME | End: 2018-04-26
Attending: INTERNAL MEDICINE | Admitting: INTERNAL MEDICINE
Payer: COMMERCIAL

## 2018-04-18 VITALS
SYSTOLIC BLOOD PRESSURE: 117 MMHG | HEART RATE: 91 BPM | OXYGEN SATURATION: 95 % | DIASTOLIC BLOOD PRESSURE: 78 MMHG | RESPIRATION RATE: 17 BRPM | TEMPERATURE: 98 F

## 2018-04-18 DIAGNOSIS — E11.9 TYPE 2 DIABETES MELLITUS WITHOUT COMPLICATIONS: ICD-10-CM

## 2018-04-18 DIAGNOSIS — I10 ESSENTIAL (PRIMARY) HYPERTENSION: ICD-10-CM

## 2018-04-18 DIAGNOSIS — E83.51 HYPOCALCEMIA: ICD-10-CM

## 2018-04-18 DIAGNOSIS — I50.9 HEART FAILURE, UNSPECIFIED: ICD-10-CM

## 2018-04-18 DIAGNOSIS — Z29.9 ENCOUNTER FOR PROPHYLACTIC MEASURES, UNSPECIFIED: ICD-10-CM

## 2018-04-18 LAB
ALBUMIN SERPL ELPH-MCNC: 3.3 G/DL — SIGNIFICANT CHANGE UP (ref 3.3–5)
ALP SERPL-CCNC: 284 U/L — HIGH (ref 40–120)
ALT FLD-CCNC: 39 U/L — HIGH (ref 4–33)
ANISOCYTOSIS BLD QL: SLIGHT — SIGNIFICANT CHANGE UP
AST SERPL-CCNC: 56 U/L — HIGH (ref 4–32)
BASE EXCESS BLDV CALC-SCNC: 6.2 MMOL/L — SIGNIFICANT CHANGE UP
BASOPHILS # BLD AUTO: 0.06 K/UL — SIGNIFICANT CHANGE UP (ref 0–0.2)
BASOPHILS NFR BLD AUTO: 0.9 % — SIGNIFICANT CHANGE UP (ref 0–2)
BILIRUB SERPL-MCNC: 3.3 MG/DL — HIGH (ref 0.2–1.2)
BLOOD GAS VENOUS - CREATININE: 1.18 MG/DL — SIGNIFICANT CHANGE UP (ref 0.5–1.3)
BUN SERPL-MCNC: 27 MG/DL — HIGH (ref 7–23)
CALCIUM SERPL-MCNC: 6.3 MG/DL — CRITICAL LOW (ref 8.4–10.5)
CHLORIDE BLDV-SCNC: 89 MMOL/L — LOW (ref 96–108)
CHLORIDE SERPL-SCNC: 82 MMOL/L — LOW (ref 98–107)
CO2 SERPL-SCNC: 26 MMOL/L — SIGNIFICANT CHANGE UP (ref 22–31)
CREAT SERPL-MCNC: 1.2 MG/DL — SIGNIFICANT CHANGE UP (ref 0.5–1.3)
EOSINOPHIL # BLD AUTO: 0.05 K/UL — SIGNIFICANT CHANGE UP (ref 0–0.5)
EOSINOPHIL NFR BLD AUTO: 0.7 % — SIGNIFICANT CHANGE UP (ref 0–6)
GAS PNL BLDV: 118 MMOL/L — CRITICAL LOW (ref 136–146)
GLUCOSE BLDV-MCNC: 170 — HIGH (ref 70–99)
GLUCOSE SERPL-MCNC: 168 MG/DL — HIGH (ref 70–99)
HCO3 BLDV-SCNC: 27 MMOL/L — SIGNIFICANT CHANGE UP (ref 20–27)
HCT VFR BLD CALC: 38.3 % — SIGNIFICANT CHANGE UP (ref 34.5–45)
HCT VFR BLDV CALC: 40.9 % — SIGNIFICANT CHANGE UP (ref 34.5–45)
HGB BLD-MCNC: 13 G/DL — SIGNIFICANT CHANGE UP (ref 11.5–15.5)
HGB BLDV-MCNC: 13.3 G/DL — SIGNIFICANT CHANGE UP (ref 11.5–15.5)
IMM GRANULOCYTES # BLD AUTO: 0.03 # — SIGNIFICANT CHANGE UP
IMM GRANULOCYTES NFR BLD AUTO: 0.4 % — SIGNIFICANT CHANGE UP (ref 0–1.5)
LACTATE BLDV-MCNC: 2.2 MMOL/L — HIGH (ref 0.5–2)
LYMPHOCYTES # BLD AUTO: 1.36 K/UL — SIGNIFICANT CHANGE UP (ref 1–3.3)
LYMPHOCYTES # BLD AUTO: 19.3 % — SIGNIFICANT CHANGE UP (ref 13–44)
MANUAL SMEAR VERIFICATION: SIGNIFICANT CHANGE UP
MCHC RBC-ENTMCNC: 24.7 PG — LOW (ref 27–34)
MCHC RBC-ENTMCNC: 33.9 % — SIGNIFICANT CHANGE UP (ref 32–36)
MCV RBC AUTO: 72.8 FL — LOW (ref 80–100)
MICROCYTES BLD QL: SLIGHT — SIGNIFICANT CHANGE UP
MONOCYTES # BLD AUTO: 0.57 K/UL — SIGNIFICANT CHANGE UP (ref 0–0.9)
MONOCYTES NFR BLD AUTO: 8.1 % — SIGNIFICANT CHANGE UP (ref 2–14)
NEUTROPHILS # BLD AUTO: 4.97 K/UL — SIGNIFICANT CHANGE UP (ref 1.8–7.4)
NEUTROPHILS NFR BLD AUTO: 70.6 % — SIGNIFICANT CHANGE UP (ref 43–77)
NRBC # FLD: 0 — SIGNIFICANT CHANGE UP
NT-PROBNP SERPL-SCNC: 1832 PG/ML — SIGNIFICANT CHANGE UP
OVALOCYTES BLD QL SMEAR: SLIGHT — SIGNIFICANT CHANGE UP
PCO2 BLDV: 54 MMHG — HIGH (ref 41–51)
PH BLDV: 7.38 PH — SIGNIFICANT CHANGE UP (ref 7.32–7.43)
PLATELET # BLD AUTO: 252 K/UL — SIGNIFICANT CHANGE UP (ref 150–400)
PLATELET COUNT - ESTIMATE: NORMAL — SIGNIFICANT CHANGE UP
PMV BLD: 11.1 FL — SIGNIFICANT CHANGE UP (ref 7–13)
PO2 BLDV: < 24 MMHG — LOW (ref 35–40)
POLYCHROMASIA BLD QL SMEAR: SLIGHT — SIGNIFICANT CHANGE UP
POTASSIUM BLDV-SCNC: 4.9 MMOL/L — HIGH (ref 3.4–4.5)
POTASSIUM SERPL-MCNC: 3.8 MMOL/L — SIGNIFICANT CHANGE UP (ref 3.5–5.3)
POTASSIUM SERPL-SCNC: 3.8 MMOL/L — SIGNIFICANT CHANGE UP (ref 3.5–5.3)
PROT SERPL-MCNC: 7.1 G/DL — SIGNIFICANT CHANGE UP (ref 6–8.3)
RBC # BLD: 5.26 M/UL — HIGH (ref 3.8–5.2)
RBC # FLD: 19.2 % — HIGH (ref 10.3–14.5)
SAO2 % BLDV: 24.4 % — LOW (ref 60–85)
SODIUM SERPL-SCNC: 124 MMOL/L — LOW (ref 135–145)
TARGETS BLD QL SMEAR: SLIGHT — SIGNIFICANT CHANGE UP
TROPONIN T SERPL-MCNC: < 0.06 NG/ML — SIGNIFICANT CHANGE UP (ref 0–0.06)
WBC # BLD: 7.04 K/UL — SIGNIFICANT CHANGE UP (ref 3.8–10.5)
WBC # FLD AUTO: 7.04 K/UL — SIGNIFICANT CHANGE UP (ref 3.8–10.5)

## 2018-04-18 PROCEDURE — 71045 X-RAY EXAM CHEST 1 VIEW: CPT | Mod: 26

## 2018-04-18 RX ORDER — DEXTROSE 50 % IN WATER 50 %
25 SYRINGE (ML) INTRAVENOUS ONCE
Qty: 0 | Refills: 0 | Status: DISCONTINUED | OUTPATIENT
Start: 2018-04-18 | End: 2018-04-26

## 2018-04-18 RX ORDER — INSULIN LISPRO 100/ML
VIAL (ML) SUBCUTANEOUS AT BEDTIME
Qty: 0 | Refills: 0 | Status: DISCONTINUED | OUTPATIENT
Start: 2018-04-18 | End: 2018-04-26

## 2018-04-18 RX ORDER — FUROSEMIDE 40 MG
80 TABLET ORAL
Qty: 0 | Refills: 0 | Status: DISCONTINUED | OUTPATIENT
Start: 2018-04-19 | End: 2018-04-26

## 2018-04-18 RX ORDER — LEVOTHYROXINE SODIUM 125 MCG
150 TABLET ORAL DAILY
Qty: 0 | Refills: 0 | Status: DISCONTINUED | OUTPATIENT
Start: 2018-04-18 | End: 2018-04-26

## 2018-04-18 RX ORDER — FUROSEMIDE 40 MG
80 TABLET ORAL ONCE
Qty: 0 | Refills: 0 | Status: COMPLETED | OUTPATIENT
Start: 2018-04-18 | End: 2018-04-18

## 2018-04-18 RX ORDER — DEXTROSE 50 % IN WATER 50 %
1 SYRINGE (ML) INTRAVENOUS ONCE
Qty: 0 | Refills: 0 | Status: DISCONTINUED | OUTPATIENT
Start: 2018-04-18 | End: 2018-04-26

## 2018-04-18 RX ORDER — CALCITRIOL 0.5 UG/1
0.5 CAPSULE ORAL DAILY
Qty: 0 | Refills: 0 | Status: DISCONTINUED | OUTPATIENT
Start: 2018-04-18 | End: 2018-04-24

## 2018-04-18 RX ORDER — HEPARIN SODIUM 5000 [USP'U]/ML
5000 INJECTION INTRAVENOUS; SUBCUTANEOUS EVERY 12 HOURS
Qty: 0 | Refills: 0 | Status: DISCONTINUED | OUTPATIENT
Start: 2018-04-18 | End: 2018-04-26

## 2018-04-18 RX ORDER — GLUCAGON INJECTION, SOLUTION 0.5 MG/.1ML
1 INJECTION, SOLUTION SUBCUTANEOUS ONCE
Qty: 0 | Refills: 0 | Status: DISCONTINUED | OUTPATIENT
Start: 2018-04-18 | End: 2018-04-26

## 2018-04-18 RX ORDER — MAGNESIUM OXIDE 400 MG ORAL TABLET 241.3 MG
400 TABLET ORAL
Qty: 0 | Refills: 0 | Status: DISCONTINUED | OUTPATIENT
Start: 2018-04-18 | End: 2018-04-19

## 2018-04-18 RX ORDER — CALCITRIOL 0.5 UG/1
0.25 CAPSULE ORAL AT BEDTIME
Qty: 0 | Refills: 0 | Status: DISCONTINUED | OUTPATIENT
Start: 2018-04-18 | End: 2018-04-24

## 2018-04-18 RX ORDER — CHOLECALCIFEROL (VITAMIN D3) 125 MCG
1000 CAPSULE ORAL DAILY
Qty: 0 | Refills: 0 | Status: DISCONTINUED | OUTPATIENT
Start: 2018-04-18 | End: 2018-04-26

## 2018-04-18 RX ORDER — DEXTROSE 50 % IN WATER 50 %
12.5 SYRINGE (ML) INTRAVENOUS ONCE
Qty: 0 | Refills: 0 | Status: DISCONTINUED | OUTPATIENT
Start: 2018-04-18 | End: 2018-04-26

## 2018-04-18 RX ORDER — CARVEDILOL PHOSPHATE 80 MG/1
3.12 CAPSULE, EXTENDED RELEASE ORAL EVERY 12 HOURS
Qty: 0 | Refills: 0 | Status: DISCONTINUED | OUTPATIENT
Start: 2018-04-18 | End: 2018-04-26

## 2018-04-18 RX ORDER — CALCIUM ACETATE 667 MG
667 TABLET ORAL
Qty: 0 | Refills: 0 | Status: DISCONTINUED | OUTPATIENT
Start: 2018-04-18 | End: 2018-04-23

## 2018-04-18 RX ORDER — FAMOTIDINE 10 MG/ML
20 INJECTION INTRAVENOUS
Qty: 0 | Refills: 0 | Status: DISCONTINUED | OUTPATIENT
Start: 2018-04-18 | End: 2018-04-26

## 2018-04-18 RX ORDER — SODIUM CHLORIDE 9 MG/ML
1000 INJECTION, SOLUTION INTRAVENOUS
Qty: 0 | Refills: 0 | Status: DISCONTINUED | OUTPATIENT
Start: 2018-04-18 | End: 2018-04-26

## 2018-04-18 RX ORDER — LISINOPRIL 2.5 MG/1
10 TABLET ORAL DAILY
Qty: 0 | Refills: 0 | Status: DISCONTINUED | OUTPATIENT
Start: 2018-04-18 | End: 2018-04-26

## 2018-04-18 RX ORDER — ASPIRIN/CALCIUM CARB/MAGNESIUM 324 MG
81 TABLET ORAL DAILY
Qty: 0 | Refills: 0 | Status: DISCONTINUED | OUTPATIENT
Start: 2018-04-18 | End: 2018-04-26

## 2018-04-18 RX ORDER — CALCIUM GLUCONATE 100 MG/ML
1 VIAL (ML) INTRAVENOUS ONCE
Qty: 0 | Refills: 0 | Status: COMPLETED | OUTPATIENT
Start: 2018-04-18 | End: 2018-04-18

## 2018-04-18 RX ORDER — INSULIN LISPRO 100/ML
VIAL (ML) SUBCUTANEOUS
Qty: 0 | Refills: 0 | Status: DISCONTINUED | OUTPATIENT
Start: 2018-04-18 | End: 2018-04-26

## 2018-04-18 RX ADMIN — CALCITRIOL 0.25 MICROGRAM(S): 0.5 CAPSULE ORAL at 22:26

## 2018-04-18 RX ADMIN — Medication 80 MILLIGRAM(S): at 18:24

## 2018-04-18 RX ADMIN — Medication 200 GRAM(S): at 20:25

## 2018-04-18 NOTE — H&P ADULT - ASSESSMENT
63 y/o F with h/o CHF, HTN, DM, asthma, thyroid Ca presents to the ED for shortness of breath X 2 weeks.

## 2018-04-18 NOTE — ED PROVIDER NOTE - ATTENDING CONTRIBUTION TO CARE
Kingsley:  63 yo female with a h/o CHF, HTN, and DM s/p recent admission to [facility] for CHF exacerbation (2 weeks ago) c/o progressively worsening SOB, LE edema and abdominal distention. Pt normally on torsemide but recently had furosemide added to her outpatient regimen but has continued to have worsening SOB. + non productive cough. NO fevers ro chills. NO abdominal pain, nausea or vomiting. + associated chest pain- currently chest pain free. NO LE pain. +EDDY Exam: chronically ill appearing, NAD, speaking in full sentences, no tachypnea or accessory muscle use. MMM. NO scleral icterus. +S1/S2, no tachycardia, decreased breath sounds at b/l bases- otherwise CTA. abdomen is distended but non tender. 4+ b/l LE pitting edema. NO calf TTP. NVI distally. A/P- 63 yo female with worsening CHF exacerbation and failure of outpatient treatment. Will obtain cbc, cmp, troponin, probnp, CXR, EKG, give IV lasix and reassess.

## 2018-04-18 NOTE — ED ADULT NURSE NOTE - OBJECTIVE STATEMENT
Pt recd A+Ox3. C/o SOB and b/l leg pain and swelling, and abd swelling x1.5 weeks, worsening. Pt with PMH CHF and recent change in diuretic medications with decreased effectives. B/L LE and abd with pitting edema on assessment. Also c.o orthopnea and productive cough. Endorses some intermittent chest tightness. Pt speaking in full sentences on assessment and O2 sat 100% on RA. Cardiac monitor in place. Will continue to monitor.

## 2018-04-18 NOTE — H&P ADULT - ATTENDING COMMENTS
Seen and examined . known to my service on previous admissions. Feeling better as urinating more. Agree with above A/P . Renal and Cardiology consulted .

## 2018-04-18 NOTE — H&P ADULT - HISTORY OF PRESENT ILLNESS
63 y/o F with h/o CHF, HTN, DM, asthma, thyroid Ca presents to the ED for shortness of breath. Pt states she has been getting increasingly short of breath for the past 2 weeks after she was discharged from [facility]. Pt states she has EDDY, shortness of breath at rest. Pt also has swelling to her abdomen, back and legs. Pt reports decreased urine output. Pt also states she was taking torsemide and started on lasix 40 mg BID. Pt denies LOC , syncope,fever, chills, N/V/D/C, chest pain, palpitations, numbness, tingling, headache, recent infection, urinary/bowel incontinence or any other complaints at this time.

## 2018-04-18 NOTE — H&P ADULT - NSHPREVIEWOFSYSTEMS_GEN_ALL_CORE
Constitutional: No fever, fatigue or weight loss.  Skin: No rash.  Eyes: No recent vision problems or eye pain.  ENT: No congestion, ear pain, or sore throat.  Endocrine: No thyroid problems.  Cardiovascular: No chest pain or palpitation.  Respiratory: + shortness of breath, + cough. No congestion, or wheezing.  Gastrointestinal: No abdominal pain, nausea, vomiting, or diarrhea.  Genitourinary: No dysuria.  Musculoskeletal: No joint swelling.  Neurologic: No headache.

## 2018-04-18 NOTE — H&P ADULT - NSHPPHYSICALEXAM_GEN_ALL_CORE
GENERAL APPEARANCE: Well developed, well nourished, alert and cooperative, and appears to be in no acute distress.  HEAD: normocephalic.  EYES: PERRL, EOMI.   EARS: External auditory canals clear, hearing grossly intact.  NECK: Neck supple, non-tender without lymphadenopathy, masses or thyromegaly.  CARDIAC: Normal S1 and S2. No S3, S4 or murmurs. Rhythm is regular.   LUNGS: Clear to auscultation and percussion without rales, rhonchi, wheezing or diminished breath sounds.  ABDOMEN: Positive bowel sounds. Soft, nondistended, nontender. No guarding or rebound. No masses.  EXTREMITIES: No significant deformity or joint abnormality. No edema. Peripheral pulses intact.   NEUROLOGICAL: CN II-XII intact. Strength and sensation symmetric and intact throughout.   SKIN: Skin normal color, texture and turgor with no lesions or eruptions.  PSYCHIATRIC: The mental examination revealed the patient was oriented to person, place, and time. The patient was able to demonstrate good judgement and reason, without hallucinations, abnormal affect or abnormal behaviors during the examination. Patient is not suicidal. GENERAL APPEARANCE: Well developed, well nourished, alert and cooperative, and appears to be in no acute distress.  HEAD: normocephalic.  EYES: PERRL, EOMI.   EARS: External auditory canals clear, hearing grossly intact.  NECK: Neck supple, non-tender without lymphadenopathy, masses or thyromegaly.  CARDIAC: Normal S1 and S2. No S3, S4 or murmurs. Rhythm is regular.   LUNGS: Clear to auscultation and percussion without rales, rhonchi, wheezing or diminished breath sounds.  ABDOMEN: Positive bowel sounds. Soft, distended, nontender. No guarding or rebound. No masses.  EXTREMITIES: No significant deformity or joint abnormality. 4+ edema bilaterally. Peripheral pulses intact.   NEUROLOGICAL: CN II-XII intact. Strength and sensation symmetric and intact throughout.   SKIN: Skin normal color, texture and turgor with no lesions or eruptions.  PSYCHIATRIC: The mental examination revealed the patient was oriented to person, place, and time. The patient was able to demonstrate good judgement and reason, without hallucinations, abnormal affect or abnormal behaviors during the examination. Patient is not suicidal.

## 2018-04-18 NOTE — H&P ADULT - PROBLEM SELECTOR PLAN 1
Admit to telemetry.   Trend CE. Strict I and O, daily weights.   Continue to monitor electrolytes. Continue with lasix 80 IV BID.   Renal consult and cards consult.   check cbc,tsh,lipid, hemoglobin a1c, cmp.  f/u MD note

## 2018-04-18 NOTE — ED ADULT TRIAGE NOTE - CHIEF COMPLAINT QUOTE
pt c/o SOB and difficulty breathing. pt very SOB in triage, abd noted to be distended and hard. PMH, CHF, AICD, thyroid CA

## 2018-04-18 NOTE — ED PROVIDER NOTE - CARE PLAN
Principal Discharge DX:	CHF exacerbation  Secondary Diagnosis:	Failure of outpatient treatment  Secondary Diagnosis:	Chest pain

## 2018-04-18 NOTE — H&P ADULT - NSHPLABSRESULTS_GEN_ALL_CORE
LABS:                        13.0   7.04  )-----------( 252      ( 18 Apr 2018 17:30 )             38.3     04-18    124<L>  |  82<L>  |  27<H>  ----------------------------<  168<H>  3.8   |  26  |  1.20    Ca    6.3<LL>      18 Apr 2018 17:30    TPro  7.1  /  Alb  3.3  /  TBili  3.3<H>  /  DBili  x   /  AST  56<H>  /  ALT  39<H>  /  AlkPhos  284<H>  04-18        CAPILLARY BLOOD GLUCOSE      EKG shows A paced @ 90 bpm

## 2018-04-18 NOTE — ED PROVIDER NOTE - PROGRESS NOTE DETAILS
Morad: LFTs noted to be elevated, likely in the setting of congestion as no tenderness on exam. Pt accepted by Dr Rojas who has seen patient in the past for admission Morad: LFTs noted to be elevated, likely in the setting of congestion as no tenderness on exam. Pt accepted by Dr Rojas who has seen patient in the past for admission.

## 2018-04-18 NOTE — ED PROVIDER NOTE - MEDICAL DECISION MAKING DETAILS
62 F with worsening SOB, abd distension and LE edema. VSS. Likely worsening chf. Labs, incl pro BNP, xr, ekg, lasix, admit

## 2018-04-18 NOTE — ED PROVIDER NOTE - ENMT, MLM
Airway patent, Nasal mucosa clear. Mouth with normal mucosa. Throat has no vesicles, no oropharyngeal exudates and uvula is midline.
no tenderness

## 2018-04-18 NOTE — ED PROVIDER NOTE - OBJECTIVE STATEMENT
62 F h/o CHF, htn, dm, recent admission to [facility] for chf exacerbation, d/c'd 1 week ago p/w worsening LE swelling, abd distension, and SOB. Was started on torsemide at [facility], but 2 days ago with worsening symptoms, started on lasix 40 bid, but symptoms persisting. Decreased UOP. No abd pain. No fever/chills. Mild chest tightness.

## 2018-04-19 LAB
24R-OH-CALCIDIOL SERPL-MCNC: 42.7 NG/ML — SIGNIFICANT CHANGE UP (ref 30–80)
ALBUMIN SERPL ELPH-MCNC: 3.2 G/DL — LOW (ref 3.3–5)
ALBUMIN SERPL ELPH-MCNC: 3.7 G/DL — SIGNIFICANT CHANGE UP (ref 3.3–5)
ALP SERPL-CCNC: 276 U/L — HIGH (ref 40–120)
ALP SERPL-CCNC: 311 U/L — HIGH (ref 40–120)
ALT FLD-CCNC: 41 U/L — HIGH (ref 4–33)
ALT FLD-CCNC: 41 U/L — HIGH (ref 4–33)
APPEARANCE UR: CLEAR — SIGNIFICANT CHANGE UP
AST SERPL-CCNC: 57 U/L — HIGH (ref 4–32)
AST SERPL-CCNC: 59 U/L — HIGH (ref 4–32)
BACTERIA # UR AUTO: SIGNIFICANT CHANGE UP
BASOPHILS # BLD AUTO: 0.07 K/UL — SIGNIFICANT CHANGE UP (ref 0–0.2)
BASOPHILS NFR BLD AUTO: 1.1 % — SIGNIFICANT CHANGE UP (ref 0–2)
BILIRUB SERPL-MCNC: 3.2 MG/DL — HIGH (ref 0.2–1.2)
BILIRUB SERPL-MCNC: 3.4 MG/DL — HIGH (ref 0.2–1.2)
BILIRUB UR-MCNC: NEGATIVE — SIGNIFICANT CHANGE UP
BLOOD UR QL VISUAL: NEGATIVE — SIGNIFICANT CHANGE UP
BUN SERPL-MCNC: 23 MG/DL — SIGNIFICANT CHANGE UP (ref 7–23)
BUN SERPL-MCNC: 25 MG/DL — HIGH (ref 7–23)
BUN SERPL-MCNC: 25 MG/DL — HIGH (ref 7–23)
BUN SERPL-MCNC: 26 MG/DL — HIGH (ref 7–23)
CA-I BLD-SCNC: 0.68 MMOL/L — CRITICAL LOW (ref 1.03–1.23)
CA-I BLD-SCNC: SIGNIFICANT CHANGE UP MMOL/L (ref 1.03–1.23)
CALCIUM SERPL-MCNC: 6.3 MG/DL — CRITICAL LOW (ref 8.4–10.5)
CALCIUM SERPL-MCNC: 6.5 MG/DL — CRITICAL LOW (ref 8.4–10.5)
CALCIUM SERPL-MCNC: 6.5 MG/DL — CRITICAL LOW (ref 8.4–10.5)
CALCIUM SERPL-MCNC: 6.7 MG/DL — LOW (ref 8.4–10.5)
CHLORIDE SERPL-SCNC: 82 MMOL/L — LOW (ref 98–107)
CHLORIDE SERPL-SCNC: 83 MMOL/L — LOW (ref 98–107)
CHLORIDE SERPL-SCNC: 85 MMOL/L — LOW (ref 98–107)
CHLORIDE SERPL-SCNC: 85 MMOL/L — LOW (ref 98–107)
CHOLEST SERPL-MCNC: 100 MG/DL — LOW (ref 120–199)
CK MB BLD-MCNC: 6.53 NG/ML — HIGH (ref 1–4.7)
CK SERPL-CCNC: 718 U/L — HIGH (ref 25–170)
CO2 SERPL-SCNC: 21 MMOL/L — LOW (ref 22–31)
CO2 SERPL-SCNC: 24 MMOL/L — SIGNIFICANT CHANGE UP (ref 22–31)
CO2 SERPL-SCNC: 25 MMOL/L — SIGNIFICANT CHANGE UP (ref 22–31)
CO2 SERPL-SCNC: 26 MMOL/L — SIGNIFICANT CHANGE UP (ref 22–31)
COLOR SPEC: SIGNIFICANT CHANGE UP
CREAT SERPL-MCNC: 1.12 MG/DL — SIGNIFICANT CHANGE UP (ref 0.5–1.3)
CREAT SERPL-MCNC: 1.14 MG/DL — SIGNIFICANT CHANGE UP (ref 0.5–1.3)
CREAT SERPL-MCNC: 1.17 MG/DL — SIGNIFICANT CHANGE UP (ref 0.5–1.3)
CREAT SERPL-MCNC: 1.19 MG/DL — SIGNIFICANT CHANGE UP (ref 0.5–1.3)
EOSINOPHIL # BLD AUTO: 0.06 K/UL — SIGNIFICANT CHANGE UP (ref 0–0.5)
EOSINOPHIL NFR BLD AUTO: 0.9 % — SIGNIFICANT CHANGE UP (ref 0–6)
GLUCOSE BLDC GLUCOMTR-MCNC: 125 MG/DL — HIGH (ref 70–99)
GLUCOSE BLDC GLUCOMTR-MCNC: 131 MG/DL — HIGH (ref 70–99)
GLUCOSE BLDC GLUCOMTR-MCNC: 159 MG/DL — HIGH (ref 70–99)
GLUCOSE BLDC GLUCOMTR-MCNC: 160 MG/DL — HIGH (ref 70–99)
GLUCOSE BLDC GLUCOMTR-MCNC: 185 MG/DL — HIGH (ref 70–99)
GLUCOSE SERPL-MCNC: 133 MG/DL — HIGH (ref 70–99)
GLUCOSE SERPL-MCNC: 140 MG/DL — HIGH (ref 70–99)
GLUCOSE SERPL-MCNC: 155 MG/DL — HIGH (ref 70–99)
GLUCOSE SERPL-MCNC: 179 MG/DL — HIGH (ref 70–99)
GLUCOSE UR-MCNC: NEGATIVE — SIGNIFICANT CHANGE UP
HBA1C BLD-MCNC: 6.8 % — HIGH (ref 4–5.6)
HCT VFR BLD CALC: 38.7 % — SIGNIFICANT CHANGE UP (ref 34.5–45)
HCT VFR BLD CALC: 39.1 % — SIGNIFICANT CHANGE UP (ref 34.5–45)
HDLC SERPL-MCNC: 34 MG/DL — LOW (ref 45–65)
HGB BLD-MCNC: 12.9 G/DL — SIGNIFICANT CHANGE UP (ref 11.5–15.5)
HGB BLD-MCNC: 13 G/DL — SIGNIFICANT CHANGE UP (ref 11.5–15.5)
IMM GRANULOCYTES # BLD AUTO: 0.03 # — SIGNIFICANT CHANGE UP
IMM GRANULOCYTES NFR BLD AUTO: 0.5 % — SIGNIFICANT CHANGE UP (ref 0–1.5)
KETONES UR-MCNC: NEGATIVE — SIGNIFICANT CHANGE UP
LEUKOCYTE ESTERASE UR-ACNC: NEGATIVE — SIGNIFICANT CHANGE UP
LIPID PNL WITH DIRECT LDL SERPL: 64 MG/DL — SIGNIFICANT CHANGE UP
LYMPHOCYTES # BLD AUTO: 1.25 K/UL — SIGNIFICANT CHANGE UP (ref 1–3.3)
LYMPHOCYTES # BLD AUTO: 19.2 % — SIGNIFICANT CHANGE UP (ref 13–44)
MAGNESIUM SERPL-MCNC: 1.5 MG/DL — LOW (ref 1.6–2.6)
MAGNESIUM SERPL-MCNC: 1.5 MG/DL — LOW (ref 1.6–2.6)
MAGNESIUM SERPL-MCNC: 3 MG/DL — HIGH (ref 1.6–2.6)
MCHC RBC-ENTMCNC: 23.9 PG — LOW (ref 27–34)
MCHC RBC-ENTMCNC: 24.1 PG — LOW (ref 27–34)
MCHC RBC-ENTMCNC: 33 % — SIGNIFICANT CHANGE UP (ref 32–36)
MCHC RBC-ENTMCNC: 33.6 % — SIGNIFICANT CHANGE UP (ref 32–36)
MCV RBC AUTO: 71.7 FL — LOW (ref 80–100)
MCV RBC AUTO: 72.5 FL — LOW (ref 80–100)
MONOCYTES # BLD AUTO: 0.64 K/UL — SIGNIFICANT CHANGE UP (ref 0–0.9)
MONOCYTES NFR BLD AUTO: 9.8 % — SIGNIFICANT CHANGE UP (ref 2–14)
NEUTROPHILS # BLD AUTO: 4.45 K/UL — SIGNIFICANT CHANGE UP (ref 1.8–7.4)
NEUTROPHILS NFR BLD AUTO: 68.5 % — SIGNIFICANT CHANGE UP (ref 43–77)
NITRITE UR-MCNC: NEGATIVE — SIGNIFICANT CHANGE UP
NRBC # FLD: 0 — SIGNIFICANT CHANGE UP
NRBC # FLD: 0.02 — SIGNIFICANT CHANGE UP
PH UR: 6.5 — SIGNIFICANT CHANGE UP (ref 4.6–8)
PHOSPHATE SERPL-MCNC: 4.7 MG/DL — HIGH (ref 2.5–4.5)
PHOSPHATE SERPL-MCNC: 5 MG/DL — HIGH (ref 2.5–4.5)
PLATELET # BLD AUTO: 213 K/UL — SIGNIFICANT CHANGE UP (ref 150–400)
PLATELET # BLD AUTO: 240 K/UL — SIGNIFICANT CHANGE UP (ref 150–400)
PMV BLD: 11.5 FL — SIGNIFICANT CHANGE UP (ref 7–13)
PMV BLD: SIGNIFICANT CHANGE UP FL (ref 7–13)
POTASSIUM SERPL-MCNC: 3.4 MMOL/L — LOW (ref 3.5–5.3)
POTASSIUM SERPL-MCNC: 3.5 MMOL/L — SIGNIFICANT CHANGE UP (ref 3.5–5.3)
POTASSIUM SERPL-MCNC: 3.8 MMOL/L — SIGNIFICANT CHANGE UP (ref 3.5–5.3)
POTASSIUM SERPL-MCNC: 5.1 MMOL/L — SIGNIFICANT CHANGE UP (ref 3.5–5.3)
POTASSIUM SERPL-SCNC: 3.4 MMOL/L — LOW (ref 3.5–5.3)
POTASSIUM SERPL-SCNC: 3.5 MMOL/L — SIGNIFICANT CHANGE UP (ref 3.5–5.3)
POTASSIUM SERPL-SCNC: 3.8 MMOL/L — SIGNIFICANT CHANGE UP (ref 3.5–5.3)
POTASSIUM SERPL-SCNC: 5.1 MMOL/L — SIGNIFICANT CHANGE UP (ref 3.5–5.3)
PROT SERPL-MCNC: 6.9 G/DL — SIGNIFICANT CHANGE UP (ref 6–8.3)
PROT SERPL-MCNC: 7.3 G/DL — SIGNIFICANT CHANGE UP (ref 6–8.3)
PROT UR-MCNC: NEGATIVE MG/DL — SIGNIFICANT CHANGE UP
PTH-INTACT SERPL-MCNC: 12.9 PG/ML — LOW (ref 15–65)
RBC # BLD: 5.39 M/UL — HIGH (ref 3.8–5.2)
RBC # BLD: 5.4 M/UL — HIGH (ref 3.8–5.2)
RBC # FLD: 18.8 % — HIGH (ref 10.3–14.5)
RBC # FLD: 19.1 % — HIGH (ref 10.3–14.5)
RBC CASTS # UR COMP ASSIST: SIGNIFICANT CHANGE UP (ref 0–?)
SODIUM SERPL-SCNC: 124 MMOL/L — LOW (ref 135–145)
SODIUM SERPL-SCNC: 126 MMOL/L — LOW (ref 135–145)
SODIUM SERPL-SCNC: 126 MMOL/L — LOW (ref 135–145)
SODIUM SERPL-SCNC: 127 MMOL/L — LOW (ref 135–145)
SP GR SPEC: 1.01 — SIGNIFICANT CHANGE UP (ref 1–1.04)
SQUAMOUS # UR AUTO: SIGNIFICANT CHANGE UP
TRIGL SERPL-MCNC: 48 MG/DL — SIGNIFICANT CHANGE UP (ref 10–149)
TROPONIN T SERPL-MCNC: < 0.06 NG/ML — SIGNIFICANT CHANGE UP (ref 0–0.06)
TSH SERPL-MCNC: 2.55 UIU/ML — SIGNIFICANT CHANGE UP (ref 0.27–4.2)
UROBILINOGEN FLD QL: NORMAL MG/DL — SIGNIFICANT CHANGE UP
WBC # BLD: 6.02 K/UL — SIGNIFICANT CHANGE UP (ref 3.8–10.5)
WBC # BLD: 6.5 K/UL — SIGNIFICANT CHANGE UP (ref 3.8–10.5)
WBC # FLD AUTO: 6.02 K/UL — SIGNIFICANT CHANGE UP (ref 3.8–10.5)
WBC # FLD AUTO: 6.5 K/UL — SIGNIFICANT CHANGE UP (ref 3.8–10.5)
WBC UR QL: SIGNIFICANT CHANGE UP (ref 0–?)

## 2018-04-19 PROCEDURE — 93281 PM DEVICE PROGR EVAL MULTI: CPT | Mod: 26

## 2018-04-19 RX ORDER — MAGNESIUM OXIDE 400 MG ORAL TABLET 241.3 MG
400 TABLET ORAL
Qty: 0 | Refills: 0 | Status: DISCONTINUED | OUTPATIENT
Start: 2018-04-19 | End: 2018-04-19

## 2018-04-19 RX ORDER — CALCIUM CARBONATE 500(1250)
1 TABLET ORAL
Qty: 0 | Refills: 0 | Status: DISCONTINUED | OUTPATIENT
Start: 2018-04-19 | End: 2018-04-21

## 2018-04-19 RX ORDER — POTASSIUM CHLORIDE 20 MEQ
40 PACKET (EA) ORAL ONCE
Qty: 0 | Refills: 0 | Status: COMPLETED | OUTPATIENT
Start: 2018-04-19 | End: 2018-04-19

## 2018-04-19 RX ORDER — CALCIUM GLUCONATE 100 MG/ML
1 VIAL (ML) INTRAVENOUS ONCE
Qty: 0 | Refills: 0 | Status: COMPLETED | OUTPATIENT
Start: 2018-04-19 | End: 2018-04-19

## 2018-04-19 RX ORDER — POTASSIUM CHLORIDE 20 MEQ
20 PACKET (EA) ORAL ONCE
Qty: 0 | Refills: 0 | Status: COMPLETED | OUTPATIENT
Start: 2018-04-20 | End: 2018-04-20

## 2018-04-19 RX ORDER — CALCIUM GLUCONATE 100 MG/ML
2 VIAL (ML) INTRAVENOUS ONCE
Qty: 0 | Refills: 0 | Status: COMPLETED | OUTPATIENT
Start: 2018-04-19 | End: 2018-04-19

## 2018-04-19 RX ORDER — MAGNESIUM SULFATE 500 MG/ML
2 VIAL (ML) INJECTION ONCE
Qty: 0 | Refills: 0 | Status: COMPLETED | OUTPATIENT
Start: 2018-04-19 | End: 2018-04-19

## 2018-04-19 RX ADMIN — Medication 200 GRAM(S): at 22:19

## 2018-04-19 RX ADMIN — Medication 40 MILLIEQUIVALENT(S): at 22:45

## 2018-04-19 RX ADMIN — Medication 200 GRAM(S): at 03:01

## 2018-04-19 RX ADMIN — FAMOTIDINE 20 MILLIGRAM(S): 10 INJECTION INTRAVENOUS at 17:21

## 2018-04-19 RX ADMIN — Medication 150 MICROGRAM(S): at 06:37

## 2018-04-19 RX ADMIN — LISINOPRIL 10 MILLIGRAM(S): 2.5 TABLET ORAL at 06:37

## 2018-04-19 RX ADMIN — CARVEDILOL PHOSPHATE 3.12 MILLIGRAM(S): 80 CAPSULE, EXTENDED RELEASE ORAL at 06:16

## 2018-04-19 RX ADMIN — CALCITRIOL 0.25 MICROGRAM(S): 0.5 CAPSULE ORAL at 21:59

## 2018-04-19 RX ADMIN — FAMOTIDINE 20 MILLIGRAM(S): 10 INJECTION INTRAVENOUS at 06:17

## 2018-04-19 RX ADMIN — Medication 80 MILLIGRAM(S): at 18:03

## 2018-04-19 RX ADMIN — MAGNESIUM OXIDE 400 MG ORAL TABLET 400 MILLIGRAM(S): 241.3 TABLET ORAL at 09:21

## 2018-04-19 RX ADMIN — Medication 80 MILLIGRAM(S): at 06:20

## 2018-04-19 RX ADMIN — Medication 667 MILLIGRAM(S): at 09:21

## 2018-04-19 RX ADMIN — CARVEDILOL PHOSPHATE 3.12 MILLIGRAM(S): 80 CAPSULE, EXTENDED RELEASE ORAL at 18:03

## 2018-04-19 RX ADMIN — Medication 667 MILLIGRAM(S): at 12:35

## 2018-04-19 RX ADMIN — Medication 667 MILLIGRAM(S): at 17:21

## 2018-04-19 RX ADMIN — HEPARIN SODIUM 5000 UNIT(S): 5000 INJECTION INTRAVENOUS; SUBCUTANEOUS at 06:20

## 2018-04-19 RX ADMIN — Medication 50 GRAM(S): at 21:59

## 2018-04-19 RX ADMIN — Medication 1: at 13:23

## 2018-04-19 RX ADMIN — Medication 1000 UNIT(S): at 11:08

## 2018-04-19 RX ADMIN — Medication 81 MILLIGRAM(S): at 11:08

## 2018-04-19 RX ADMIN — Medication 1 TABLET(S): at 17:21

## 2018-04-19 RX ADMIN — CALCITRIOL 0.5 MICROGRAM(S): 0.5 CAPSULE ORAL at 11:08

## 2018-04-19 RX ADMIN — Medication 200 GRAM(S): at 13:45

## 2018-04-19 RX ADMIN — HEPARIN SODIUM 5000 UNIT(S): 5000 INJECTION INTRAVENOUS; SUBCUTANEOUS at 17:21

## 2018-04-19 NOTE — PROGRESS NOTE ADULT - ASSESSMENT
61 y/o F with h/o CHF, HTN, DM, asthma, thyroid Ca presents to the ED for shortness of breath. Pt states she has been getting increasingly short of breath for the past 2 weeks after she was discharged from [facility]. Pt states she has EDDY, shortness of breath at rest. Pt also has swelling to her abdomen, back and legs. Pt reports decreased urine output. Pt also states she was taking torsemide and started on lasix 40 mg BID. Pt denies LOC , syncope,fever, chills, N/V/D/C, chest pain, palpitations, numbness, tingling, headache, recent infection, urinary/bowel incontinence or any other complaints at this time.      Problem/Plan - 1:  ·  Problem: CHF exacerbation acute on chronic systolic .  Plan:  Continue with lasix 80 IV BID.   Cardiology consult noted .   TTE pending.    Problem/Plan - 2:  ·  Problem: Hypocalcemia secondary to Hypoparathyroidism .  Plan: S/p 1 gram of calcium gluconate.   Continue to monitor calcium levels. Will check Ionized calcium .      Problem/Plan - 3:  ·  Problem: DM (diabetes mellitus).  Plan: Sugars in good range.   Stop PO medications and start sliding scale.   Check hemoglobin a1c.   Low carb diet.      Problem/Plan - 4:  ·  Problem: Nonischemic Cardiomyopathy .  Plan: AICD interrogation pending.      Problem/Plan - 5:  ·  Problem: HTN (hypertension).  Plan: BP readings okay .   Continue with current medications.   Low salt,low cholesterol, DASH diet.     Problem/Plan - 6:  ·  Problem: Need for prophylactic measure.  Plan: Heparin SQ for vte prophylaxis.

## 2018-04-19 NOTE — PHYSICAL THERAPY INITIAL EVALUATION ADULT - AMBULATION SKILLS, REHAB EVAL
Follow the SI joint injection handout and Facet Injection handouts you received today.    If you have questions or concerns you should call Dr. Small's office at [phone].   independent

## 2018-04-19 NOTE — CHART NOTE - NSCHARTNOTEFT_GEN_A_CORE
ELECTROPHYSIOLOGY    Device Interrogation Performed                                  Date/Time:   04/19/2018  @ 3:00pm  :      Medtronic                                  Model:     Claria MRI CRT-D                           Mode:                DDD                                       Rate:  50/130                                                                                                      Tachy zones:  194 and 240  Atrial Lead:  P wave amplitude:        2.0                  mv            Impedance         437                              Ohms  Threshold          1.25                                V @   0.4             ms      Ventricular Lead: RV  R wave amplitude          4.3                 mv  Impedance             399                        Ohms  Threshold                0.5                         V @       0.5          ms      Ventricular Lead: LV  amplitude                                         mv  Impedance            646                           Ohms  Threshold              2.75                           V @    0.5            ms                                  Battery Status:                     Good                                                   Underlying Rhythm:   Normal sinus rhythm 75 b/min 1st degree AVB    Events/Observation:   One episode of NSVT @ 214 b/min x 8 beats on 4/18/2018.  No atrial tachyarrhythmias. Optivol fluid level increased beyond the threshold since March 1, 2018.  Total BiV pacing 99%                Impression/Plan:  Normal ICD function.   Normal sensing and pacing via iterative testing. Good battery status. Excellent threshold capture.  No reprogramming.

## 2018-04-19 NOTE — CONSULT NOTE ADULT - ATTENDING COMMENTS
Patient seen and examined.  Agree with above.   -61 yo F with NICM admitted with volume overload  -Recommend IV diuresis to keep O > I  -replete lytes per renal  -elevated LFt's may be secondary to hepatic congestion - monitor daily, check abdominal ultrasound  -check tte  -check MTD aicd interrogation  -obtain outpatient records    Kunal Muller MD  [facility]  [address], [id number]  [place] [address]  office: [phone]  pager: [phone]

## 2018-04-20 DIAGNOSIS — I50.9 HEART FAILURE, UNSPECIFIED: ICD-10-CM

## 2018-04-20 DIAGNOSIS — E03.9 HYPOTHYROIDISM, UNSPECIFIED: ICD-10-CM

## 2018-04-20 LAB
ALBUMIN SERPL ELPH-MCNC: 3 G/DL — LOW (ref 3.3–5)
ALP SERPL-CCNC: 262 U/L — HIGH (ref 40–120)
ALT FLD-CCNC: 43 U/L — HIGH (ref 4–33)
AST SERPL-CCNC: 59 U/L — HIGH (ref 4–32)
BILIRUB DIRECT SERPL-MCNC: 1.7 MG/DL — HIGH (ref 0.1–0.2)
BILIRUB SERPL-MCNC: 3 MG/DL — HIGH (ref 0.2–1.2)
BUN SERPL-MCNC: 27 MG/DL — HIGH (ref 7–23)
CALCIUM SERPL-MCNC: 6.8 MG/DL — LOW (ref 8.4–10.5)
CHLORIDE SERPL-SCNC: 87 MMOL/L — LOW (ref 98–107)
CHLORIDE UR-SCNC: 47 MMOL/L — SIGNIFICANT CHANGE UP
CO2 SERPL-SCNC: 24 MMOL/L — SIGNIFICANT CHANGE UP (ref 22–31)
CREAT ?TM UR-MCNC: 22.97 MG/DL — SIGNIFICANT CHANGE UP
CREAT SERPL-MCNC: 1.25 MG/DL — SIGNIFICANT CHANGE UP (ref 0.5–1.3)
GLUCOSE BLDC GLUCOMTR-MCNC: 119 MG/DL — HIGH (ref 70–99)
GLUCOSE BLDC GLUCOMTR-MCNC: 121 MG/DL — HIGH (ref 70–99)
GLUCOSE BLDC GLUCOMTR-MCNC: 165 MG/DL — HIGH (ref 70–99)
GLUCOSE BLDC GLUCOMTR-MCNC: 194 MG/DL — HIGH (ref 70–99)
GLUCOSE SERPL-MCNC: 132 MG/DL — HIGH (ref 70–99)
HCT VFR BLD CALC: 38.2 % — SIGNIFICANT CHANGE UP (ref 34.5–45)
HGB BLD-MCNC: 12.6 G/DL — SIGNIFICANT CHANGE UP (ref 11.5–15.5)
MAGNESIUM SERPL-MCNC: 1.7 MG/DL — SIGNIFICANT CHANGE UP (ref 1.6–2.6)
MCHC RBC-ENTMCNC: 24.1 PG — LOW (ref 27–34)
MCHC RBC-ENTMCNC: 33 % — SIGNIFICANT CHANGE UP (ref 32–36)
MCV RBC AUTO: 73 FL — LOW (ref 80–100)
NRBC # FLD: 0 — SIGNIFICANT CHANGE UP
OSMOLALITY UR: 214 MOSMO/KG — SIGNIFICANT CHANGE UP (ref 50–1200)
PHOSPHATE SERPL-MCNC: 5.4 MG/DL — HIGH (ref 2.5–4.5)
PLATELET # BLD AUTO: 233 K/UL — SIGNIFICANT CHANGE UP (ref 150–400)
PMV BLD: 11.2 FL — SIGNIFICANT CHANGE UP (ref 7–13)
POTASSIUM SERPL-MCNC: 3.9 MMOL/L — SIGNIFICANT CHANGE UP (ref 3.5–5.3)
POTASSIUM SERPL-SCNC: 3.9 MMOL/L — SIGNIFICANT CHANGE UP (ref 3.5–5.3)
POTASSIUM UR-SCNC: 19.9 MMOL/L — SIGNIFICANT CHANGE UP
PROT SERPL-MCNC: 6.7 G/DL — SIGNIFICANT CHANGE UP (ref 6–8.3)
RBC # BLD: 5.23 M/UL — HIGH (ref 3.8–5.2)
RBC # FLD: 19.6 % — HIGH (ref 10.3–14.5)
SODIUM SERPL-SCNC: 129 MMOL/L — LOW (ref 135–145)
SODIUM UR-SCNC: 45 MMOL/L — SIGNIFICANT CHANGE UP
URATE UR-MCNC: 25.9 MG/DL — SIGNIFICANT CHANGE UP
UUN UR-MCNC: 195.2 MG/DL — SIGNIFICANT CHANGE UP
WBC # BLD: 7.28 K/UL — SIGNIFICANT CHANGE UP (ref 3.8–10.5)
WBC # FLD AUTO: 7.28 K/UL — SIGNIFICANT CHANGE UP (ref 3.8–10.5)

## 2018-04-20 PROCEDURE — 76705 ECHO EXAM OF ABDOMEN: CPT | Mod: 26

## 2018-04-20 RX ORDER — ACETAMINOPHEN 500 MG
650 TABLET ORAL EVERY 6 HOURS
Qty: 0 | Refills: 0 | Status: DISCONTINUED | OUTPATIENT
Start: 2018-04-20 | End: 2018-04-26

## 2018-04-20 RX ORDER — MAGNESIUM OXIDE 400 MG ORAL TABLET 241.3 MG
400 TABLET ORAL
Qty: 0 | Refills: 0 | Status: DISCONTINUED | OUTPATIENT
Start: 2018-04-20 | End: 2018-04-26

## 2018-04-20 RX ORDER — INSULIN GLARGINE 100 [IU]/ML
6 INJECTION, SOLUTION SUBCUTANEOUS AT BEDTIME
Qty: 0 | Refills: 0 | Status: DISCONTINUED | OUTPATIENT
Start: 2018-04-20 | End: 2018-04-26

## 2018-04-20 RX ADMIN — CARVEDILOL PHOSPHATE 3.12 MILLIGRAM(S): 80 CAPSULE, EXTENDED RELEASE ORAL at 05:59

## 2018-04-20 RX ADMIN — Medication 81 MILLIGRAM(S): at 13:25

## 2018-04-20 RX ADMIN — MAGNESIUM OXIDE 400 MG ORAL TABLET 400 MILLIGRAM(S): 241.3 TABLET ORAL at 18:29

## 2018-04-20 RX ADMIN — FAMOTIDINE 20 MILLIGRAM(S): 10 INJECTION INTRAVENOUS at 18:26

## 2018-04-20 RX ADMIN — CALCITRIOL 0.5 MICROGRAM(S): 0.5 CAPSULE ORAL at 13:25

## 2018-04-20 RX ADMIN — Medication 667 MILLIGRAM(S): at 13:25

## 2018-04-20 RX ADMIN — Medication 667 MILLIGRAM(S): at 18:26

## 2018-04-20 RX ADMIN — FAMOTIDINE 20 MILLIGRAM(S): 10 INJECTION INTRAVENOUS at 05:59

## 2018-04-20 RX ADMIN — Medication 20 MILLIEQUIVALENT(S): at 05:59

## 2018-04-20 RX ADMIN — MAGNESIUM OXIDE 400 MG ORAL TABLET 400 MILLIGRAM(S): 241.3 TABLET ORAL at 13:28

## 2018-04-20 RX ADMIN — Medication 1: at 13:28

## 2018-04-20 RX ADMIN — Medication 1000 UNIT(S): at 13:25

## 2018-04-20 RX ADMIN — Medication 667 MILLIGRAM(S): at 09:58

## 2018-04-20 RX ADMIN — CALCITRIOL 0.25 MICROGRAM(S): 0.5 CAPSULE ORAL at 21:58

## 2018-04-20 RX ADMIN — Medication 1 TABLET(S): at 05:59

## 2018-04-20 RX ADMIN — Medication 80 MILLIGRAM(S): at 18:26

## 2018-04-20 RX ADMIN — CARVEDILOL PHOSPHATE 3.12 MILLIGRAM(S): 80 CAPSULE, EXTENDED RELEASE ORAL at 18:26

## 2018-04-20 RX ADMIN — Medication 150 MICROGRAM(S): at 05:59

## 2018-04-20 RX ADMIN — LISINOPRIL 10 MILLIGRAM(S): 2.5 TABLET ORAL at 05:59

## 2018-04-20 RX ADMIN — INSULIN GLARGINE 6 UNIT(S): 100 INJECTION, SOLUTION SUBCUTANEOUS at 21:57

## 2018-04-20 RX ADMIN — Medication 1 TABLET(S): at 18:29

## 2018-04-20 RX ADMIN — Medication 80 MILLIGRAM(S): at 05:59

## 2018-04-20 NOTE — CONSULT NOTE ADULT - ASSESSMENT
Assessment  DMT2: 62y Female with DM T2 with hyperglycemia on insulin coverage, blood sugars running high, no hypoglycemic episode,  eating meals, compliant with low carb diet.  Hypothyroidism:  S/P Thyroidectomy for thyroid cancer, s/p ZULETA ablation, on synthroid 150 mcg po daily, asymptomatic.  Hypocalcemia: S/P Parathyroiectomy/thyroidectomy on Calcium, Vit D, Calcitriol, Phos supp.   CHF: On diuretics, stable, monitored,

## 2018-04-20 NOTE — CONSULT NOTE ADULT - SUBJECTIVE AND OBJECTIVE BOX
HISTORY OF PRESENT ILLNESS:  This is a 61-year-old female with past medical history of hypertension, hyperlipidemia, chronic RBBB,  obesity, thyroid CA,  nonischemic cardiomyopathy with an ejection fraction of about 25% and normal coronaries on recent  cardiac catheterization at [facility] with Dr Chavarria , s/p Medtronic ICD placement in June 2017  admitted with complaints of progressively worsening abdominal distention, bilateral lower extremity edema and shortness of breath.  She states about 2 weeks ago she was admitted at [place] for hypocalcemia at which time her diuretics were held. She was placed back on Torsemide upon discharge however despite medication and dietary compliance, she has developed worsening LE edema and abdominal distention and SOB. She denies any anginal chest pain, palpitations, syncope or near syncope or ICD fire.     PAST MEDICAL & SURGICAL HISTORY:  Asthma  CHF (congestive heart failure): hypothyroid  DM (diabetes mellitus)  HTN (hypertension)  Thyroid ca  S/P thyroidectomy    	    MEDICATIONS:  aspirin enteric coated 81 milliGRAM(s) Oral daily  carvedilol 3.125 milliGRAM(s) Oral every 12 hours  furosemide   Injectable 80 milliGRAM(s) IV Push two times a day  heparin  Injectable 5000 Unit(s) SubCutaneous every 12 hours  lisinopril 10 milliGRAM(s) Oral daily  famotidine    Tablet 20 milliGRAM(s) Oral two times a day  glucagon  Injectable 1 milliGRAM(s) IntraMuscular once PRN  insulin lispro (HumaLOG) corrective regimen sliding scale   SubCutaneous three times a day before meals  insulin lispro (HumaLOG) corrective regimen sliding scale   SubCutaneous at bedtime  levothyroxine 150 MICROGram(s) Oral daily  calcitriol   Capsule 0.25 MICROGram(s) Oral at bedtime  calcitriol   Capsule 0.5 MICROGram(s) Oral daily  calcium acetate 667 milliGRAM(s) Oral three times a day with meals  calcium carbonate 1250 mG (OsCal) 1 Tablet(s) Oral two times a day  cholecalciferol 1000 Unit(s) Oral daily  dextrose 5%. 1000 milliLiter(s) IV Continuous <Continuous>      Allergies  penicillin (Rash)      FAMILY HISTORY:  No pertinent family history in first degree relatives      SOCIAL HISTORY:    [ +] Non-smoker  [ ] Smoker  [- ] No Alcohol      REVIEW OF SYSTEMS:  abdominal distention, BL LE edema, SOB  otherwise negative     PHYSICAL EXAM:  T(C): 36.7 (04-19-18 @ 10:55), Max: 36.8 (04-18-18 @ 22:03)  HR: 75 (04-19-18 @ 10:55) (75 - 91)  BP: 112/74 (04-19-18 @ 10:55) (102/75 - 127/95)  RR: 18 (04-19-18 @ 10:55) (17 - 18)  SpO2: 99% (04-19-18 @ 10:55) (95% - 100%)  Wt(kg): --  I&O's Summary    18 Apr 2018 07:01  -  19 Apr 2018 07:00  --------------------------------------------------------  IN: 0 mL / OUT: 300 mL / NET: -300 mL        Appearance: Normal	  HEENT:   Normal oral mucosa, PERRL, EOMI	  Lymphatic: No lymphadenopathy  Cardiovascular: Normal S1 S2, No JVD, No murmurs, No edema  Respiratory: Lungs clear to auscultation	  Psychiatry: A & O x 3, Mood & affect appropriate  Gastrointestinal:  +abd distention + edema BS+   Skin: No rashes, No ecchymoses, No cyanosis	  Neurologic: Non-focal  Extremities:+BL LE edema up to the level of her abdomen  Normal range of motion, No clubbing, cyanosis   Vascular: Peripheral pulses palpable 2+ bilaterally    TELEMETRY:  paced  	    ECG:  < from: 12 Lead ECG (01.16.18 @ 21:35) >  Atrial-sensed ventricular-paced rhythm    < end of copied text >  	    RADIOLOGY:   < from: Xray Chest 1 View- PORTABLE-Urgent (04.18.18 @ 17:16) >    IMPRESSION:    Cardiomegaly with clear lungs.  	  LABS:	 	    CARDIAC MARKERS: Troponin T, Serum (04.19.18 @ 00:15)    Troponin T, Serum: < 0.06:   CKMB: 6.53 ng/mL (04-19 @ 00:15)                          13.0   6.02  )-----------( 213      ( 19 Apr 2018 05:45 )             38.7       04-19    124<L>  |  83<L>  |  25<H>  ----------------------------<  133<H>  3.8   |  21<L>  |  1.14    Ca    6.5<LL>      19 Apr 2018 05:45  Mg     3.0     04-19    TPro  6.9  /  Alb  3.2<L>  /  TBili  3.2<H>  /  DBili  x   /  AST  57<H>  /  ALT  41<H>  /  AlkPhos  276<H>  04-19      proBNP: Serum Pro-Brain Natriuretic Peptide: 1832 pg/mL (04-18 @ 17:30)      HgA1c: Hemoglobin A1C, Whole Blood: 6.8 % (04-19 @ 05:45)      TSH: Thyroid Stimulating Hormone, Serum: 2.55 uIU/mL (04-19 @ 05:45)    < from: Transthoracic Echocardiogram (05.22.14 @ 11:45) >  CONCLUSIONS:  1. Tethered mitral valve leaflets with normal opening.  Moderate-severe mitral regurgitation.  2. Left atrial enlargement.  3. Mild left ventricular enlargement.  Eccentric left  ventricular hypertrophy (dilated left ventricle with normal  relative wall thickness).  4. Severe global left ventricular systolic dysfunction.  5. Moderate right atrial enlargement.  6. Right ventricular enlargement with decreased right  ventricular systolic function.  7. Normal tricuspid valve. Severe tricuspid regurgitation.  8. Estimated pulmonary artery systolic pressure equals 50  mm Hg, assuming right atrial pressure equals 10  mm Hg,  consistent with moderate pulmonary hypertension.    < end of copied text >        ASSESSMENT/PLAN: 	 This is a 61-year-old female with past medical history of hypertension, hyperlipidemia, chronic RBBB,  obesity, thyroid CA,  nonischemic cardiomyopathy with an ejection fraction of about 25% and normal coronaries on recent  cardiac catheterization at [facility] with Dr Chavarria , s/p Medtronic ICD placement in June 2017  admitted with complaints of progressively worsening abdominal distention, bilateral lower extremity edema and shortness of breath.  She states about 2 weeks ago she was admitted at [place] for hypocalcemia at which time her diuretics were held. She was placed back on Torsemide upon discharge however despite medication and dietary compliance, she has developed worsening LE edema and abdominal distention and SOB. She denies any anginal chest pain, palpitations, syncope or near syncope or ICD fire.     -- The patient has ruled out for acute coronary syndrome with negative serial cardiac enzymes  -- would continue with Lasix 80 mg  IV BID  diuresis  - to keep output> input - if not diuresing well may need to give Torsemide   -- c/w BB/ACE for NICM  -- repeat TTE to assess LV function/r/o pericardial effusion  -- will call for ICD interrogation   -- consider abdominal ultrasound to r/o ascites  -- would obtain records from Dr Chavarria  -- HD stable  -- final recs pending above  -- of note patient states she would like to follow with a cardiologist in this area as going to [facility] to see Dr Chavarria is not possible for her anymore    [facility]ohanne Boyce [facility]  O:  [phone]  P: [phone]
HPI:  61 y/o F with h/o CHF, HTN, DM, asthma, thyroid Ca presents to the ED for shortness of breath. Pt states she has been getting increasingly short of breath for the past 2 weeks after she was discharged from [facility]. Pt states she has EDDY, shortness of breath at rest. Pt also has swelling to her abdomen, back and legs. Pt reports decreased urine output. Pt also states she was taking torsemide and started on lasix 40 mg BID. Pt denies LOC , syncope,fever, chills, N/V/D/C, chest pain, palpitations, numbness, tingling, headache, recent infection, urinary/bowel incontinence or any other complaints at this time. (18 Apr 2018 20:09)  Patient has history of diabetes, on oral meds at home, no recent hypoglycemic episodes, no polyuria polydipsia. Patient follows up with PCP for diabetes management.  Also has history of thyroid cancer s/p thyroidectomy likely had full parathyroidectomy now on calcium and synthroid. She FU with [facility] in [facility].  PAST MEDICAL & SURGICAL HISTORY:  Asthma  CHF (congestive heart failure): hypothyroid  DM (diabetes mellitus)  HTN (hypertension)  Thyroid ca  S/P thyroidectomy      FAMILY HISTORY:  No pertinent family history in first degree relatives      Social History:    Outpatient Medications:    MEDICATIONS  (STANDING):  aspirin enteric coated 81 milliGRAM(s) Oral daily  calcitriol   Capsule 0.25 MICROGram(s) Oral at bedtime  calcitriol   Capsule 0.5 MICROGram(s) Oral daily  calcium acetate 667 milliGRAM(s) Oral three times a day with meals  calcium carbonate 1250 mG (OsCal) 1 Tablet(s) Oral two times a day  carvedilol 3.125 milliGRAM(s) Oral every 12 hours  cholecalciferol 1000 Unit(s) Oral daily  dextrose 5%. 1000 milliLiter(s) (50 mL/Hr) IV Continuous <Continuous>  dextrose 50% Injectable 12.5 Gram(s) IV Push once  dextrose 50% Injectable 25 Gram(s) IV Push once  dextrose 50% Injectable 25 Gram(s) IV Push once  famotidine    Tablet 20 milliGRAM(s) Oral two times a day  furosemide   Injectable 80 milliGRAM(s) IV Push two times a day  heparin  Injectable 5000 Unit(s) SubCutaneous every 12 hours  insulin glargine Injectable (LANTUS) 6 Unit(s) SubCutaneous at bedtime  insulin lispro (HumaLOG) corrective regimen sliding scale   SubCutaneous three times a day before meals  insulin lispro (HumaLOG) corrective regimen sliding scale   SubCutaneous at bedtime  levothyroxine 150 MICROGram(s) Oral daily  lisinopril 10 milliGRAM(s) Oral daily  magnesium oxide 400 milliGRAM(s) Oral three times a day with meals    MEDICATIONS  (PRN):  acetaminophen   Tablet. 650 milliGRAM(s) Oral every 6 hours PRN Mild Pain (1 - 3)  dextrose Gel 1 Dose(s) Oral once PRN Blood Glucose LESS THAN 70 milliGRAM(s)/deciliter  glucagon  Injectable 1 milliGRAM(s) IntraMuscular once PRN Glucose LESS THAN 70 milligrams/deciliter      Allergies    penicillin (Rash)    Intolerances      Review of Systems:  Constitutional: No fever, no chills  Eyes: No blurry vision  Neuro: No tremors  HEENT: No pain, no neck swelling  Cardiovascular: No chest pain, no palpitations  Respiratory: Has SOB, no cough  GI: No nausea, vomiting, abdominal pain  : No dysuria  Skin: no rash  MSK: Has leg swelling.  Psych: no depression  Endocrine: no polyuria, polydipsia    ALL OTHER SYSTEMS REVIEWED AND NEGATIVE    UNABLE TO OBTAIN    PHYSICAL EXAM:  VITALS: T(C): 36.5 (04-20-18 @ 12:30)  T(F): 97.7 (04-20-18 @ 12:30), Max: 98.3 (04-19-18 @ 18:09)  HR: 92 (04-20-18 @ 12:30) (80 - 92)  BP: 105/72 (04-20-18 @ 12:30) (104/80 - 122/76)  RR:  (16 - 17)  SpO2:  (98% - 100%)  Wt(kg): --  GENERAL: NAD, well-groomed, well-developed  EYES: No proptosis, no lid lag  HEENT:  Atraumatic, Normocephalic  THYROID: Normal size, no palpable nodules  RESPIRATORY: Clear to auscultation bilaterally; No rales, rhonchi, wheezing  CARDIOVASCULAR: Si S2, No murmurs;  GI: Soft, non distended, normal bowel sounds  SKIN: Dry, intact, No rashes or lesions  MUSCULOSKELETAL: Has BL lower extremity edema.  NEURO:  no tremor, sensation decreased in feet BL,    POCT Blood Glucose.: 194 mg/dL (04-20-18 @ 12:41)  POCT Blood Glucose.: 119 mg/dL (04-20-18 @ 09:38)  POCT Blood Glucose.: 159 mg/dL (04-19-18 @ 21:40)  POCT Blood Glucose.: 131 mg/dL (04-19-18 @ 18:20)  POCT Blood Glucose.: 160 mg/dL (04-19-18 @ 13:16)  POCT Blood Glucose.: 125 mg/dL (04-19-18 @ 09:12)  POCT Blood Glucose.: 185 mg/dL (04-19-18 @ 01:10)                            12.6   7.28  )-----------( 233      ( 20 Apr 2018 07:30 )             38.2       04-20    129<L>  |  87<L>  |  27<H>  ----------------------------<  132<H>  3.9   |  24  |  1.25    EGFR if : 54  EGFR if non : 46    Ca    6.8<L>      04-20  Mg     1.7     04-20  Phos  5.4     04-20    TPro  6.7  /  Alb  3.0<L>  /  TBili  3.0<H>  /  DBili  1.7<H>  /  AST  59<H>  /  ALT  43<H>  /  AlkPhos  262<H>  04-20      Thyroid Function Tests:  04-19 @ 05:45 TSH 2.55 FreeT4 -- T3 -- Anti TPO -- Anti Thyroglobulin Ab -- TSI --      Hemoglobin A1C, Whole Blood: 6.8 % <H> [4.0 - 5.6] (04-19-18 @ 05:45)      04-19 Chol 100<L> LDL 64 HDL 34<L> Trig 48    Radiology:
REASON FOR ADMISSION: Patient is a 62y old  Female who presents with a chief complaint of shortness of breath (18 Apr 2018 20:09)    HISTORY OF PRESENT ILLNESS: 61 y/o F with h/o CHF, HTN, DM, asthma, thyroid Ca post thyroidectomy complicated with hypoparathyroidism, ch hypocalcemia and hypomagnesemia who is admitted with CHF exacerbation and started on lasix IV. Nephrology consulted for management of Hyponatremia; hypocalcemia and CKD III A    Patient seen and evaluated in the ED. Feels better but still significant peripheral edema and abdominal distention which started to build up since 2 weeks ago. Patient reports SOB at rest and significant EDDY, decreased UOP despite being on torsemide. She was recently switched to lasix 40 mg BID with no improvement.   Patient denies dizziness, headache, CP, palpitations, nausea, vomiting, abd pain, fever, chills etc.   She reports on being on calcium 3 tabs TID (unknown type of calcium - likely calcium carbonate or the dose) and calcitriol 0.5/0.25 mcg AM/PM  She is on magnesium oxide 800 mg BID as well; other meds reviewed and as in the chart.    REVIEW OF SYSTEMS: 12 review of systems negative except what is stated in HPI    PAST MEDICAL & SURGICAL HISTORY:  Asthma  CHF (congestive heart failure): hypothyroid  DM (diabetes mellitus)  HTN (hypertension)  Thyroid ca  S/P thyroidectomy      SOCIAL HISTORY: no smoking, drinking or drugs    FAMILY HISTORY: FAMILY HISTORY:  No pertinent family history in first degree relatives    ALLERGIES: Allergies  penicillin (Rash)  Intolerances    Home Medications:  aspirin 81 mg oral delayed release tablet: 1 tab(s) orally once a day (18 Apr 2018 20:01)  calcitriol 0.5 mcg oral capsule: 1 cap(s) orally once a day, 1/2 a cap at night (18 Apr 2018 20:01)  carvedilol 3.125 mg oral tablet: 1 tab(s) orally every 12 hours (18 Apr 2018 20:01)  famotidine 20 mg oral tablet: 1 tab(s) orally 2 times a day (18 Apr 2018 20:01)  lisinopril 10 mg oral tablet: 1 tab(s) orally once a day (18 Apr 2018 20:01)  magnesium oxide 400 mg (241.3 mg elemental magnesium) oral tablet: 1 tab(s) orally 3 times a day (with meals) (18 Apr 2018 20:01)  torsemide 10 mg oral tablet: alternating 2-3 tab(s) orally every other day   (18 Apr 2018 20:01)  Tradjenta 5 mg oral tablet: 1 tab(s) orally once a day (18 Apr 2018 20:01)  Vitamin D3 1000 intl units oral tablet: 1 tab(s) orally once a day (18 Apr 2018 20:01)      MEDICATIONS  (STANDING):  aspirin enteric coated 81 milliGRAM(s) Oral daily  calcitriol   Capsule 0.25 MICROGram(s) Oral at bedtime  calcitriol   Capsule 0.5 MICROGram(s) Oral daily  calcium acetate 667 milliGRAM(s) Oral three times a day with meals  carvedilol 3.125 milliGRAM(s) Oral every 12 hours  cholecalciferol 1000 Unit(s) Oral daily  dextrose 5%. 1000 milliLiter(s) (50 mL/Hr) IV Continuous <Continuous>  dextrose 50% Injectable 12.5 Gram(s) IV Push once  dextrose 50% Injectable 25 Gram(s) IV Push once  dextrose 50% Injectable 25 Gram(s) IV Push once  famotidine    Tablet 20 milliGRAM(s) Oral two times a day  furosemide   Injectable 80 milliGRAM(s) IV Push two times a day  heparin  Injectable 5000 Unit(s) SubCutaneous every 12 hours  insulin lispro (HumaLOG) corrective regimen sliding scale   SubCutaneous three times a day before meals  insulin lispro (HumaLOG) corrective regimen sliding scale   SubCutaneous at bedtime  levothyroxine 150 MICROGram(s) Oral daily  lisinopril 10 milliGRAM(s) Oral daily  magnesium oxide 400 milliGRAM(s) Oral three times a day with meals    MEDICATIONS  (PRN):  dextrose Gel 1 Dose(s) Oral once PRN Blood Glucose LESS THAN 70 milliGRAM(s)/deciliter  glucagon  Injectable 1 milliGRAM(s) IntraMuscular once PRN Glucose LESS THAN 70 milligrams/deciliter      PHYSICAL EXAMINATION  VITAL SIGNS:  Vital Signs Last 24 Hrs  T(C): 36.3 (19 Apr 2018 05:41), Max: 36.8 (18 Apr 2018 22:03)  T(F): 97.4 (19 Apr 2018 05:41), Max: 98.3 (18 Apr 2018 22:03)  HR: 86 (19 Apr 2018 05:41) (85 - 91)  BP: 108/62 (19 Apr 2018 05:41) (102/75 - 127/95)  BP(mean): --  RR: 17 (19 Apr 2018 05:41) (17 - 18)  SpO2: 99% (19 Apr 2018 05:41) (95% - 100%)  I&O's Summary    18 Apr 2018 07:01  -  19 Apr 2018 07:00  --------------------------------------------------------  IN: 0 mL / OUT: 300 mL / NET: -300 mL      GA: awake and alert, no distress  EYES: EOMI, clear conj, anicteric sclera  ENT: MMM, clear OP, neck supple  LUNGS: CTABL, no wrc, normal effort  HEART; RS1S2 normal, no mrg, +++ peripheral edema  ABD; Soft, NT/ND/BS+, no peritoneal signs  EXT; well perfused, ++ edema up to mid abdomen;   ; no arteaga  NEURO: AAO x 3, sensation and motor intact  SKIN: warm and dry, no rash on visible skin    LABORATORY DATA:                        13.0   6.02  )-----------( 213      ( 19 Apr 2018 05:45 )             38.7     04-19    124<L>  |  83<L>  |  25<H>  ----------------------------<  133<H>  3.8   |  21<L>  |  1.14    Ca    6.5<LL>      19 Apr 2018 05:45  Mg     3.0     04-19    TPro  6.9  /  Alb  3.2<L>  /  TBili  3.2<H>  /  DBili  x   /  AST  57<H>  /  ALT  41<H>  /  AlkPhos  276<H>  04-19    LIVER FUNCTIONS - ( 19 Apr 2018 05:45 )  Alb: 3.2 g/dL / Pro: 6.9 g/dL / ALK PHOS: 276 u/L / ALT: 41 u/L / AST: 57 u/L / GGT: x             IMAGING: Reviewed and as per records: pertinent positives:       ASSESSMENT: This is a 61 y/o F with h/o HFrEF, HTN, DM, asthma, thyroid Ca post thyroidectomy complicated with hypoparathyroidism, ch hypocalcemia and hypomagnesemia who is admitted with CHF exacerbation and started on lasix IV. Nephrology consulted for management of hypocalcemia and CKD III A    1. Hyponatremia likely multifactorial; unknown exact cause at present  2. Hypocalcemia likely due to primary hypoparathyroidisms in the setting of parathyroidectomy when thyroidectomy done few years back; on calcium and active vitamin D supplements at home  3. Hypomagnesemia on magnesium supplements- Mag level seems stable   4. CKD 3 likely due to prior AKIs; no proteinuria on prior UAs  5. Hypervolemia and CHF exacerbation on lasix 80 mg BID IV  6. Hypoalbuminemia  7. Hypophosphatemia due to hypoparathyroidism      RECOMMENDATIONS:  - BMP BID; let me know if serum sodium trending down  - protein intake; ensure with meals TID  - limit fluid intake to less than 1 liter a day  - continue lasix 80 mg IV BID; consider continues drip if UOP unsatisfactory and no significant improvement next 24 hrs  - calcium carbonate 1250 mg BID  - continue phoslo 667 mg TID with meals  - continue calcitriol 0.5 mcg AM and 0.25 mcg PM  - check ionized calcium and give IV calcium if ionized calcium less than 1  - check PTH and vitamin D;   - will send urine electrolytes, urine creatinine, urine prot/creat ratio, urine osmolality, UA  - keep MAP>70  - avoid nephrotoxins including NSAIDs, Iodinated contrast, Aminoglycoside etc as much as possible,   - strict record of input and output, daily weight; keep net negative  - renally dose all medications for a GFR ~ 60 mL/min/1.73 square meters     Thank you for allowing me to participate on this patient's care. Please do not hesitate to call with questions.    I will follow with you.    Larisa Lay MD   [facility]  [phone]

## 2018-04-20 NOTE — CONSULT NOTE ADULT - PROBLEM SELECTOR RECOMMENDATION 9
Will start on Lantus 6u qhs, continue current Humalog correction scale for now. Will continue monitoring FS, log, will Follow up.  Patient counseled for compliance with consistent low carb diet.

## 2018-04-20 NOTE — PROGRESS NOTE ADULT - ASSESSMENT
63 y/o F with h/o CHF, HTN, DM, asthma, thyroid Ca presents to the ED for shortness of breath. Pt states she has been getting increasingly short of breath for the past 2 weeks after she was discharged from [facility]. Pt states she has EDDY, shortness of breath at rest. Pt also has swelling to her abdomen, back and legs. Pt reports decreased urine output. Pt also states she was taking torsemide and started on lasix 40 mg BID. Pt denies LOC , syncope,fever, chills, N/V/D/C, chest pain, palpitations, numbness, tingling, headache, recent infection, urinary/bowel incontinence or any other complaints at this time.      Problem/Plan - 1:  ·  Problem: CHF exacerbation acute on chronic systolic .  Plan:  Continue with lasix 80 IV BID. Diuresing well.   Cardiology helping.   TTE pending.    Problem/Plan - 2:  ·  Problem: Hypocalcemia secondary to Hypoparathyroidism .  Plan: S/p 1 gram of calcium gluconate.   Continue to monitor calcium levels. Renal helping.      Problem/Plan - 3:  ·  Problem: DM (diabetes mellitus).  Plan: Sugars in good range.   Stop PO medications and start sliding scale.   Check hemoglobin a1c.   Low carb diet.      Problem/Plan - 4:  ·  Problem: Nonischemic Cardiomyopathy .  Plan: AICD interrogation pending.      Problem/Plan - 5:  ·  Problem: HTN (hypertension).  Plan: BP readings okay .   Continue with current medications.   Low salt,low cholesterol, DASH diet.     Problem/Plan - 6:  ·  Problem: Hyponatremia .  Plan: Correcting slowly .     Problem/Plan - 7:  ·  Problem: Abdominal Pain with abnormal LFT .  Plan: US abdomen pending. Likely secondary to Hepatic congestion from CHF.     Problem/Plan - 8:  ·  Problem: Need for prophylactic measure.  Plan: Heparin SQ for vte prophylaxis.

## 2018-04-21 LAB
BUN SERPL-MCNC: 27 MG/DL — HIGH (ref 7–23)
CA-I BLD-SCNC: 0.68 MMOL/L — CRITICAL LOW (ref 1.03–1.23)
CALCIUM SERPL-MCNC: 6.7 MG/DL — LOW (ref 8.4–10.5)
CHLORIDE SERPL-SCNC: 87 MMOL/L — LOW (ref 98–107)
CO2 SERPL-SCNC: 27 MMOL/L — SIGNIFICANT CHANGE UP (ref 22–31)
CREAT SERPL-MCNC: 1.29 MG/DL — SIGNIFICANT CHANGE UP (ref 0.5–1.3)
GLUCOSE BLDC GLUCOMTR-MCNC: 113 MG/DL — HIGH (ref 70–99)
GLUCOSE BLDC GLUCOMTR-MCNC: 121 MG/DL — HIGH (ref 70–99)
GLUCOSE BLDC GLUCOMTR-MCNC: 189 MG/DL — HIGH (ref 70–99)
GLUCOSE BLDC GLUCOMTR-MCNC: 206 MG/DL — HIGH (ref 70–99)
GLUCOSE SERPL-MCNC: 143 MG/DL — HIGH (ref 70–99)
HCT VFR BLD CALC: 38.2 % — SIGNIFICANT CHANGE UP (ref 34.5–45)
HGB BLD-MCNC: 12.4 G/DL — SIGNIFICANT CHANGE UP (ref 11.5–15.5)
MAGNESIUM SERPL-MCNC: 1.8 MG/DL — SIGNIFICANT CHANGE UP (ref 1.6–2.6)
MCHC RBC-ENTMCNC: 23.9 PG — LOW (ref 27–34)
MCHC RBC-ENTMCNC: 32.5 % — SIGNIFICANT CHANGE UP (ref 32–36)
MCV RBC AUTO: 73.7 FL — LOW (ref 80–100)
NRBC # FLD: 0 — SIGNIFICANT CHANGE UP
PHOSPHATE SERPL-MCNC: 5 MG/DL — HIGH (ref 2.5–4.5)
PLATELET # BLD AUTO: 226 K/UL — SIGNIFICANT CHANGE UP (ref 150–400)
PMV BLD: 12 FL — SIGNIFICANT CHANGE UP (ref 7–13)
POTASSIUM SERPL-MCNC: 3.5 MMOL/L — SIGNIFICANT CHANGE UP (ref 3.5–5.3)
POTASSIUM SERPL-SCNC: 3.5 MMOL/L — SIGNIFICANT CHANGE UP (ref 3.5–5.3)
RBC # BLD: 5.18 M/UL — SIGNIFICANT CHANGE UP (ref 3.8–5.2)
RBC # FLD: 19 % — HIGH (ref 10.3–14.5)
SODIUM SERPL-SCNC: 130 MMOL/L — LOW (ref 135–145)
T4 FREE SERPL-MCNC: 1.61 NG/DL — SIGNIFICANT CHANGE UP (ref 0.9–1.8)
TSH SERPL-MCNC: 2.24 UIU/ML — SIGNIFICANT CHANGE UP (ref 0.27–4.2)
URATE SERPL-MCNC: 12.6 MG/DL — HIGH (ref 2.5–7)
WBC # BLD: 6.91 K/UL — SIGNIFICANT CHANGE UP (ref 3.8–10.5)
WBC # FLD AUTO: 6.91 K/UL — SIGNIFICANT CHANGE UP (ref 3.8–10.5)

## 2018-04-21 RX ORDER — POTASSIUM CHLORIDE 20 MEQ
40 PACKET (EA) ORAL EVERY 4 HOURS
Qty: 0 | Refills: 0 | Status: COMPLETED | OUTPATIENT
Start: 2018-04-21 | End: 2018-04-21

## 2018-04-21 RX ORDER — CALCIUM CARBONATE 500(1250)
2 TABLET ORAL
Qty: 0 | Refills: 0 | Status: DISCONTINUED | OUTPATIENT
Start: 2018-04-21 | End: 2018-04-26

## 2018-04-21 RX ORDER — COLCHICINE 0.6 MG
0.6 TABLET ORAL
Qty: 0 | Refills: 0 | Status: DISCONTINUED | OUTPATIENT
Start: 2018-04-21 | End: 2018-04-26

## 2018-04-21 RX ORDER — CALCIUM GLUCONATE 100 MG/ML
2 VIAL (ML) INTRAVENOUS ONCE
Qty: 0 | Refills: 0 | Status: COMPLETED | OUTPATIENT
Start: 2018-04-21 | End: 2018-04-21

## 2018-04-21 RX ADMIN — MAGNESIUM OXIDE 400 MG ORAL TABLET 400 MILLIGRAM(S): 241.3 TABLET ORAL at 17:46

## 2018-04-21 RX ADMIN — Medication 0.6 MILLIGRAM(S): at 13:51

## 2018-04-21 RX ADMIN — Medication 2: at 13:08

## 2018-04-21 RX ADMIN — Medication 80 MILLIGRAM(S): at 17:45

## 2018-04-21 RX ADMIN — Medication 667 MILLIGRAM(S): at 17:45

## 2018-04-21 RX ADMIN — Medication 667 MILLIGRAM(S): at 13:08

## 2018-04-21 RX ADMIN — CARVEDILOL PHOSPHATE 3.12 MILLIGRAM(S): 80 CAPSULE, EXTENDED RELEASE ORAL at 17:45

## 2018-04-21 RX ADMIN — Medication 40 MILLIEQUIVALENT(S): at 16:34

## 2018-04-21 RX ADMIN — CALCITRIOL 0.5 MICROGRAM(S): 0.5 CAPSULE ORAL at 11:05

## 2018-04-21 RX ADMIN — Medication 150 MICROGRAM(S): at 05:20

## 2018-04-21 RX ADMIN — Medication 1 TABLET(S): at 05:19

## 2018-04-21 RX ADMIN — Medication 81 MILLIGRAM(S): at 11:05

## 2018-04-21 RX ADMIN — INSULIN GLARGINE 6 UNIT(S): 100 INJECTION, SOLUTION SUBCUTANEOUS at 22:33

## 2018-04-21 RX ADMIN — MAGNESIUM OXIDE 400 MG ORAL TABLET 400 MILLIGRAM(S): 241.3 TABLET ORAL at 11:04

## 2018-04-21 RX ADMIN — LISINOPRIL 10 MILLIGRAM(S): 2.5 TABLET ORAL at 05:19

## 2018-04-21 RX ADMIN — HEPARIN SODIUM 5000 UNIT(S): 5000 INJECTION INTRAVENOUS; SUBCUTANEOUS at 05:19

## 2018-04-21 RX ADMIN — FAMOTIDINE 20 MILLIGRAM(S): 10 INJECTION INTRAVENOUS at 17:46

## 2018-04-21 RX ADMIN — Medication 1000 UNIT(S): at 11:05

## 2018-04-21 RX ADMIN — CARVEDILOL PHOSPHATE 3.12 MILLIGRAM(S): 80 CAPSULE, EXTENDED RELEASE ORAL at 05:20

## 2018-04-21 RX ADMIN — Medication 667 MILLIGRAM(S): at 11:04

## 2018-04-21 RX ADMIN — Medication 80 MILLIGRAM(S): at 05:18

## 2018-04-21 RX ADMIN — MAGNESIUM OXIDE 400 MG ORAL TABLET 400 MILLIGRAM(S): 241.3 TABLET ORAL at 13:08

## 2018-04-21 RX ADMIN — Medication 200 GRAM(S): at 16:34

## 2018-04-21 RX ADMIN — Medication 2 TABLET(S): at 17:46

## 2018-04-21 RX ADMIN — CALCITRIOL 0.25 MICROGRAM(S): 0.5 CAPSULE ORAL at 21:43

## 2018-04-21 RX ADMIN — FAMOTIDINE 20 MILLIGRAM(S): 10 INJECTION INTRAVENOUS at 05:19

## 2018-04-21 RX ADMIN — Medication 40 MILLIEQUIVALENT(S): at 21:43

## 2018-04-21 NOTE — DIETITIAN INITIAL EVALUATION ADULT. - OTHER INFO
Nutrition Consult X Registered Dietitian. Pt 63 yo female with admit diagnosis: Heart Failure. Pt appears alert, oriented. Per Pt her appetite okay now; No chew/swallow problem voiced; no nausea/vomiting/diarrhea reported @ present. Per Pt her UBW: ~225#; Pt gained ~30# in a week 2/2 fluid retention. At home Pt tries to avoid salt & sugar reported. Of note Pt's HbA1c level 6.8% (4/19). Better food choices discussed with Pt; RDN answered questions/concerns related to diet. Written materials on diet also provided. Pt verbalized understanding. RDN remains available, Pt made aware.

## 2018-04-21 NOTE — PROGRESS NOTE ADULT - ASSESSMENT
Assessment  DMT2: 62y Female with DM T2 with hyperglycemia started on basal bolus insulin, blood sugars improving, no hypoglycemic episode,  eating meals, compliant with low carb diet.  Hypothyroidism:  S/P Thyroidectomy for thyroid cancer, s/p ZULETA ablation, on synthroid 150 mcg po daily, asymptomatic.  Hypocalcemia: S/P Parathyroiectomy/thyroidectomy on Calcium, Vit D, Calcitriol, Phos supp.   CHF: On diuretics, stable, monitored,

## 2018-04-21 NOTE — DIETITIAN INITIAL EVALUATION ADULT. - PERTINENT LABORATORY DATA
(4/21) Na 130 L, Cl 87 L, BUN 27 H, Glu 143 H phosphorus 5.0 H;                (4/20) Albumin 3.0 L, AST 59 H,  H, ALT 43 H;         (4/19) HbA1c 6.8% H

## 2018-04-21 NOTE — DIETITIAN INITIAL EVALUATION ADULT. - NS AS NUTRI INTERV MEALS SNACK
Diets modified for specific foods and ingredients/Other (specify)/1. Suggest: PO diet rx: Regular, Consistent Carbohydrate (no snacks), DASH/TLC (cholesterol and sodium restricted), Low Fat; Fluid restriction per MD discretion;             2. Encourage & assist Pt with meals; Monitor PO diet tolerance; Honor food preferences;            3. Monitor labs, daily weights, hydration status;

## 2018-04-21 NOTE — PROGRESS NOTE ADULT - ASSESSMENT
61 y/o F with h/o CHF, HTN, DM, asthma, thyroid Ca presents to the ED for shortness of breath. Pt states she has been getting increasingly short of breath for the past 2 weeks after she was discharged from [facility]. Pt states she has EDDY, shortness of breath at rest. Pt also has swelling to her abdomen, back and legs. Pt reports decreased urine output. Pt also states she was taking torsemide and started on lasix 40 mg BID. Pt denies LOC , syncope,fever, chills, N/V/D/C, chest pain, palpitations, numbness, tingling, headache, recent infection, urinary/bowel incontinence or any other complaints at this time.      Problem/Plan - 1:  ·  Problem: CHF exacerbation acute on chronic systolic .  Plan:  Continue with lasix 80 IV BID. Diuresing well.   Cardiology helping.   TTE pending.    Problem/Plan - 2:  ·  Problem: Hypocalcemia secondary to Hypoparathyroidism .  Plan: Renal and Endo helping . Replacing.   Continue to monitor calcium levels. Renal helping.      Problem/Plan - 3:  ·  Problem: DM (diabetes mellitus).  Plan: Sugars in good range. Endo helping .   Stop PO medications and start sliding scale.   Check hemoglobin a1c.   Low carb diet.      Problem/Plan - 4:  ·  Problem: Nonischemic Cardiomyopathy .  Plan: AICD interrogation pending.      Problem/Plan - 5:  ·  Problem: HTN (hypertension).  Plan: BP readings okay .   Continue with current medications.   Low salt,low cholesterol, DASH diet.     Problem/Plan - 6:  ·  Problem: Hyponatremia .  Plan: Correcting slowly . Renal helping.     Problem/Plan - 7:  ·  Problem: Abdominal Pain with abnormal LFT .  Plan: US abdomen noted .  Likely secondary to Hepatic congestion from CHF.     Problem/Plan - 8:  ·  Problem: Need for prophylactic measure.  Plan: Heparin SQ for vte prophylaxis. 63 y/o F with h/o CHF, HTN, DM, asthma, thyroid Ca presents to the ED for shortness of breath. Pt states she has been getting increasingly short of breath for the past 2 weeks after she was discharged from [facility]. Pt states she has EDDY, shortness of breath at rest. Pt also has swelling to her abdomen, back and legs. Pt reports decreased urine output. Pt also states she was taking torsemide and started on lasix 40 mg BID. Pt denies LOC , syncope,fever, chills, N/V/D/C, chest pain, palpitations, numbness, tingling, headache, recent infection, urinary/bowel incontinence or any other complaints at this time.      Problem/Plan - 1:  ·  Problem: CHF exacerbation acute on chronic systolic .  Plan:  Continue with lasix 80 IV BID. Diuresing well.   Cardiology helping.   TTE pending.    Problem/Plan - 2:  ·  Problem: Hypocalcemia secondary to Hypoparathyroidism .  Plan: Renal and Endo helping . Replacing.   Continue to monitor calcium levels. Renal helping.      Problem/Plan - 3:  ·  Problem: DM (diabetes mellitus).  Plan: Sugars in good range. Endo helping .   Stop PO medications and start sliding scale.   Check hemoglobin a1c.   Low carb diet.      Problem/Plan - 4:  ·  Problem: Nonischemic Cardiomyopathy .  Plan: AICD interrogation pending.      Problem/Plan - 5:  ·  Problem: HTN (hypertension).  Plan: BP readings okay .   Continue with current medications.   Low salt,low cholesterol, DASH diet.     Problem/Plan - 6:  ·  Problem: Hyponatremia .  Plan: Correcting slowly . Renal helping.     Problem/Plan - 7:  ·  Problem: Abdominal Pain with abnormal LFT .  Plan: US abdomen noted .  Likely secondary to Hepatic congestion from CHF.     Problem/Plan - 8:  ·  Problem: Gout flare up .  Plan: Ankle pain so will start Colchicine . Will need Allopurinol once pain resolved.     Problem/Plan - 9:  ·  Problem: Need for prophylactic measure.  Plan: Heparin SQ for vte prophylaxis.

## 2018-04-21 NOTE — DIETITIAN INITIAL EVALUATION ADULT. - DIET TYPE
consistent carbohydrate (no snacks)/low sodium/supplement (specify)/regular/1000ml/PO supplement: Ensure Enlive 8oz. 3x daily (will provide additional ~1050 Kcal, ~60 gm Protein);

## 2018-04-21 NOTE — DIETITIAN INITIAL EVALUATION ADULT. - PERTINENT MEDS FT
Aspirin, Heparin, Insulin (Humalog), Insulin (Lantus), Mag-Ox, Lasix, Protonix, Coreg, Vit D3, Pepcid

## 2018-04-22 LAB
BUN SERPL-MCNC: 27 MG/DL — HIGH (ref 7–23)
CALCIUM SERPL-MCNC: 7.2 MG/DL — LOW (ref 8.4–10.5)
CHLORIDE SERPL-SCNC: 88 MMOL/L — LOW (ref 98–107)
CO2 SERPL-SCNC: 29 MMOL/L — SIGNIFICANT CHANGE UP (ref 22–31)
CREAT SERPL-MCNC: 1.26 MG/DL — SIGNIFICANT CHANGE UP (ref 0.5–1.3)
GLUCOSE BLDC GLUCOMTR-MCNC: 125 MG/DL — HIGH (ref 70–99)
GLUCOSE BLDC GLUCOMTR-MCNC: 137 MG/DL — HIGH (ref 70–99)
GLUCOSE BLDC GLUCOMTR-MCNC: 192 MG/DL — HIGH (ref 70–99)
GLUCOSE BLDC GLUCOMTR-MCNC: 206 MG/DL — HIGH (ref 70–99)
GLUCOSE SERPL-MCNC: 148 MG/DL — HIGH (ref 70–99)
HCT VFR BLD CALC: 36.5 % — SIGNIFICANT CHANGE UP (ref 34.5–45)
HGB BLD-MCNC: 12.1 G/DL — SIGNIFICANT CHANGE UP (ref 11.5–15.5)
MCHC RBC-ENTMCNC: 24.3 PG — LOW (ref 27–34)
MCHC RBC-ENTMCNC: 33.2 % — SIGNIFICANT CHANGE UP (ref 32–36)
MCV RBC AUTO: 73.3 FL — LOW (ref 80–100)
NRBC # FLD: 0.02 — SIGNIFICANT CHANGE UP
PLATELET # BLD AUTO: 213 K/UL — SIGNIFICANT CHANGE UP (ref 150–400)
PMV BLD: 11.5 FL — SIGNIFICANT CHANGE UP (ref 7–13)
POTASSIUM SERPL-MCNC: 3.8 MMOL/L — SIGNIFICANT CHANGE UP (ref 3.5–5.3)
POTASSIUM SERPL-SCNC: 3.8 MMOL/L — SIGNIFICANT CHANGE UP (ref 3.5–5.3)
RBC # BLD: 4.98 M/UL — SIGNIFICANT CHANGE UP (ref 3.8–5.2)
RBC # FLD: 19 % — HIGH (ref 10.3–14.5)
SODIUM SERPL-SCNC: 132 MMOL/L — LOW (ref 135–145)
WBC # BLD: 5.86 K/UL — SIGNIFICANT CHANGE UP (ref 3.8–10.5)
WBC # FLD AUTO: 5.86 K/UL — SIGNIFICANT CHANGE UP (ref 3.8–10.5)

## 2018-04-22 RX ORDER — MAGNESIUM SULFATE 500 MG/ML
1 VIAL (ML) INJECTION ONCE
Qty: 0 | Refills: 0 | Status: COMPLETED | OUTPATIENT
Start: 2018-04-22 | End: 2018-04-22

## 2018-04-22 RX ORDER — CALCIUM GLUCONATE 100 MG/ML
2 VIAL (ML) INTRAVENOUS ONCE
Qty: 0 | Refills: 0 | Status: COMPLETED | OUTPATIENT
Start: 2018-04-22 | End: 2018-04-22

## 2018-04-22 RX ADMIN — CALCITRIOL 0.5 MICROGRAM(S): 0.5 CAPSULE ORAL at 12:59

## 2018-04-22 RX ADMIN — HEPARIN SODIUM 5000 UNIT(S): 5000 INJECTION INTRAVENOUS; SUBCUTANEOUS at 05:02

## 2018-04-22 RX ADMIN — FAMOTIDINE 20 MILLIGRAM(S): 10 INJECTION INTRAVENOUS at 17:09

## 2018-04-22 RX ADMIN — Medication 1000 UNIT(S): at 12:59

## 2018-04-22 RX ADMIN — FAMOTIDINE 20 MILLIGRAM(S): 10 INJECTION INTRAVENOUS at 05:02

## 2018-04-22 RX ADMIN — Medication 667 MILLIGRAM(S): at 17:09

## 2018-04-22 RX ADMIN — LISINOPRIL 10 MILLIGRAM(S): 2.5 TABLET ORAL at 05:01

## 2018-04-22 RX ADMIN — CARVEDILOL PHOSPHATE 3.12 MILLIGRAM(S): 80 CAPSULE, EXTENDED RELEASE ORAL at 05:02

## 2018-04-22 RX ADMIN — HEPARIN SODIUM 5000 UNIT(S): 5000 INJECTION INTRAVENOUS; SUBCUTANEOUS at 17:12

## 2018-04-22 RX ADMIN — Medication 0.6 MILLIGRAM(S): at 17:09

## 2018-04-22 RX ADMIN — Medication 2 TABLET(S): at 17:09

## 2018-04-22 RX ADMIN — CARVEDILOL PHOSPHATE 3.12 MILLIGRAM(S): 80 CAPSULE, EXTENDED RELEASE ORAL at 17:08

## 2018-04-22 RX ADMIN — Medication 150 MICROGRAM(S): at 05:02

## 2018-04-22 RX ADMIN — INSULIN GLARGINE 6 UNIT(S): 100 INJECTION, SOLUTION SUBCUTANEOUS at 21:53

## 2018-04-22 RX ADMIN — Medication 1: at 13:03

## 2018-04-22 RX ADMIN — CALCITRIOL 0.25 MICROGRAM(S): 0.5 CAPSULE ORAL at 21:48

## 2018-04-22 RX ADMIN — Medication 80 MILLIGRAM(S): at 17:10

## 2018-04-22 RX ADMIN — Medication 100 GRAM(S): at 01:43

## 2018-04-22 RX ADMIN — Medication 200 GRAM(S): at 13:04

## 2018-04-22 RX ADMIN — Medication 2 TABLET(S): at 05:01

## 2018-04-22 RX ADMIN — Medication 80 MILLIGRAM(S): at 05:02

## 2018-04-22 RX ADMIN — Medication 0.6 MILLIGRAM(S): at 05:01

## 2018-04-22 RX ADMIN — MAGNESIUM OXIDE 400 MG ORAL TABLET 400 MILLIGRAM(S): 241.3 TABLET ORAL at 17:09

## 2018-04-22 RX ADMIN — MAGNESIUM OXIDE 400 MG ORAL TABLET 400 MILLIGRAM(S): 241.3 TABLET ORAL at 08:24

## 2018-04-22 RX ADMIN — Medication 667 MILLIGRAM(S): at 13:00

## 2018-04-22 RX ADMIN — Medication 667 MILLIGRAM(S): at 08:24

## 2018-04-22 RX ADMIN — MAGNESIUM OXIDE 400 MG ORAL TABLET 400 MILLIGRAM(S): 241.3 TABLET ORAL at 12:59

## 2018-04-22 RX ADMIN — Medication 81 MILLIGRAM(S): at 12:59

## 2018-04-22 NOTE — PROGRESS NOTE ADULT - ASSESSMENT
61 y/o F with h/o CHF, HTN, DM, asthma, thyroid Ca presents to the ED for shortness of breath. Pt states she has been getting increasingly short of breath for the past 2 weeks after she was discharged from [facility]. Pt states she has EDDY, shortness of breath at rest. Pt also has swelling to her abdomen, back and legs. Pt reports decreased urine output. Pt also states she was taking torsemide and started on lasix 40 mg BID. Pt denies LOC , syncope,fever, chills, N/V/D/C, chest pain, palpitations, numbness, tingling, headache, recent infection, urinary/bowel incontinence or any other complaints at this time.      Problem/Plan - 1:  ·  Problem: CHF exacerbation acute on chronic systolic .  Plan:  Continue with lasix 80 IV BID. Diuresing well.   Cardiology helping.   TTE pending.    Problem/Plan - 2:  ·  Problem: Hypocalcemia secondary to Hypoparathyroidism .  Plan: Renal and Endo helping . Replacing.   Continue to monitor calcium levels. Renal helping.      Problem/Plan - 3:  ·  Problem: DM (diabetes mellitus).  Plan: Sugars in good range. Endo helping .   Stop PO medications and start sliding scale.   Check hemoglobin a1c.   Low carb diet.      Problem/Plan - 4:  ·  Problem: Nonischemic Cardiomyopathy .  Plan: AICD interrogation noted.      Problem/Plan - 5:  ·  Problem: HTN (hypertension).  Plan: BP readings okay .   Continue with current medications.   Low salt,low cholesterol, DASH diet.     Problem/Plan - 6:  ·  Problem: Hyponatremia .  Plan: Correcting slowly . Renal helping.     Problem/Plan - 7:  ·  Problem: Abdominal Pain with abnormal LFT .  Plan: US abdomen noted .  Likely secondary to Hepatic congestion from CHF.     Problem/Plan - 8:  ·  Problem: Gout flare up .  Plan: Ankle pain resolved . Will need Allopurinol once pain resolved.     Problem/Plan - 9:  ·  Problem: Need for prophylactic measure.  Plan: Heparin SQ for vte prophylaxis.

## 2018-04-23 LAB
BUN SERPL-MCNC: 26 MG/DL — HIGH (ref 7–23)
CA-I BLD-SCNC: 0.8 MMOL/L — LOW (ref 1.03–1.23)
CALCIUM SERPL-MCNC: 7.2 MG/DL — LOW (ref 8.4–10.5)
CHLORIDE SERPL-SCNC: 87 MMOL/L — LOW (ref 98–107)
CO2 SERPL-SCNC: 27 MMOL/L — SIGNIFICANT CHANGE UP (ref 22–31)
CREAT SERPL-MCNC: 1.32 MG/DL — HIGH (ref 0.5–1.3)
GLUCOSE BLDC GLUCOMTR-MCNC: 107 MG/DL — HIGH (ref 70–99)
GLUCOSE BLDC GLUCOMTR-MCNC: 129 MG/DL — HIGH (ref 70–99)
GLUCOSE BLDC GLUCOMTR-MCNC: 142 MG/DL — HIGH (ref 70–99)
GLUCOSE BLDC GLUCOMTR-MCNC: 165 MG/DL — HIGH (ref 70–99)
GLUCOSE SERPL-MCNC: 103 MG/DL — HIGH (ref 70–99)
HCT VFR BLD CALC: 37.3 % — SIGNIFICANT CHANGE UP (ref 34.5–45)
HGB BLD-MCNC: 12.2 G/DL — SIGNIFICANT CHANGE UP (ref 11.5–15.5)
MAGNESIUM SERPL-MCNC: 1.7 MG/DL — SIGNIFICANT CHANGE UP (ref 1.6–2.6)
MCHC RBC-ENTMCNC: 23.6 PG — LOW (ref 27–34)
MCHC RBC-ENTMCNC: 32.7 % — SIGNIFICANT CHANGE UP (ref 32–36)
MCV RBC AUTO: 72.3 FL — LOW (ref 80–100)
NRBC # FLD: 0 — SIGNIFICANT CHANGE UP
PHOSPHATE SERPL-MCNC: 5.5 MG/DL — HIGH (ref 2.5–4.5)
PLATELET # BLD AUTO: 230 K/UL — SIGNIFICANT CHANGE UP (ref 150–400)
PMV BLD: 10.6 FL — SIGNIFICANT CHANGE UP (ref 7–13)
POTASSIUM SERPL-MCNC: 3.4 MMOL/L — LOW (ref 3.5–5.3)
POTASSIUM SERPL-SCNC: 3.4 MMOL/L — LOW (ref 3.5–5.3)
RBC # BLD: 5.16 M/UL — SIGNIFICANT CHANGE UP (ref 3.8–5.2)
RBC # FLD: 19.4 % — HIGH (ref 10.3–14.5)
SODIUM SERPL-SCNC: 131 MMOL/L — LOW (ref 135–145)
WBC # BLD: 6.25 K/UL — SIGNIFICANT CHANGE UP (ref 3.8–10.5)
WBC # FLD AUTO: 6.25 K/UL — SIGNIFICANT CHANGE UP (ref 3.8–10.5)

## 2018-04-23 RX ORDER — CALCIUM ACETATE 667 MG
1334 TABLET ORAL
Qty: 0 | Refills: 0 | Status: DISCONTINUED | OUTPATIENT
Start: 2018-04-23 | End: 2018-04-26

## 2018-04-23 RX ORDER — BACITRACIN ZINC 500 UNIT/G
1 OINTMENT IN PACKET (EA) TOPICAL
Qty: 0 | Refills: 0 | Status: COMPLETED | OUTPATIENT
Start: 2018-04-23 | End: 2018-04-25

## 2018-04-23 RX ORDER — POTASSIUM CHLORIDE 20 MEQ
40 PACKET (EA) ORAL ONCE
Qty: 0 | Refills: 0 | Status: COMPLETED | OUTPATIENT
Start: 2018-04-23 | End: 2018-04-23

## 2018-04-23 RX ORDER — CALCIUM GLUCONATE 100 MG/ML
1 VIAL (ML) INTRAVENOUS ONCE
Qty: 0 | Refills: 0 | Status: COMPLETED | OUTPATIENT
Start: 2018-04-23 | End: 2018-04-23

## 2018-04-23 RX ADMIN — Medication 1334 MILLIGRAM(S): at 12:59

## 2018-04-23 RX ADMIN — Medication 2 TABLET(S): at 05:40

## 2018-04-23 RX ADMIN — CALCITRIOL 0.25 MICROGRAM(S): 0.5 CAPSULE ORAL at 22:32

## 2018-04-23 RX ADMIN — Medication 1 APPLICATION(S): at 18:23

## 2018-04-23 RX ADMIN — Medication 150 MICROGRAM(S): at 05:40

## 2018-04-23 RX ADMIN — CALCITRIOL 0.5 MICROGRAM(S): 0.5 CAPSULE ORAL at 09:45

## 2018-04-23 RX ADMIN — LISINOPRIL 10 MILLIGRAM(S): 2.5 TABLET ORAL at 05:40

## 2018-04-23 RX ADMIN — CARVEDILOL PHOSPHATE 3.12 MILLIGRAM(S): 80 CAPSULE, EXTENDED RELEASE ORAL at 05:40

## 2018-04-23 RX ADMIN — INSULIN GLARGINE 6 UNIT(S): 100 INJECTION, SOLUTION SUBCUTANEOUS at 21:56

## 2018-04-23 RX ADMIN — Medication 0.6 MILLIGRAM(S): at 05:40

## 2018-04-23 RX ADMIN — Medication 81 MILLIGRAM(S): at 09:46

## 2018-04-23 RX ADMIN — MAGNESIUM OXIDE 400 MG ORAL TABLET 400 MILLIGRAM(S): 241.3 TABLET ORAL at 09:27

## 2018-04-23 RX ADMIN — CARVEDILOL PHOSPHATE 3.12 MILLIGRAM(S): 80 CAPSULE, EXTENDED RELEASE ORAL at 18:22

## 2018-04-23 RX ADMIN — Medication 200 GRAM(S): at 13:00

## 2018-04-23 RX ADMIN — Medication 667 MILLIGRAM(S): at 09:27

## 2018-04-23 RX ADMIN — Medication 80 MILLIGRAM(S): at 05:40

## 2018-04-23 RX ADMIN — Medication 1000 UNIT(S): at 09:46

## 2018-04-23 RX ADMIN — Medication 80 MILLIGRAM(S): at 18:20

## 2018-04-23 RX ADMIN — Medication 1334 MILLIGRAM(S): at 18:21

## 2018-04-23 RX ADMIN — Medication 2 TABLET(S): at 18:22

## 2018-04-23 RX ADMIN — Medication 40 MILLIEQUIVALENT(S): at 09:45

## 2018-04-23 RX ADMIN — FAMOTIDINE 20 MILLIGRAM(S): 10 INJECTION INTRAVENOUS at 18:22

## 2018-04-23 RX ADMIN — MAGNESIUM OXIDE 400 MG ORAL TABLET 400 MILLIGRAM(S): 241.3 TABLET ORAL at 12:19

## 2018-04-23 RX ADMIN — Medication 667 MILLIGRAM(S): at 12:19

## 2018-04-23 RX ADMIN — Medication 0.6 MILLIGRAM(S): at 18:22

## 2018-04-23 RX ADMIN — FAMOTIDINE 20 MILLIGRAM(S): 10 INJECTION INTRAVENOUS at 05:40

## 2018-04-23 RX ADMIN — MAGNESIUM OXIDE 400 MG ORAL TABLET 400 MILLIGRAM(S): 241.3 TABLET ORAL at 18:21

## 2018-04-23 RX ADMIN — Medication 1 APPLICATION(S): at 09:27

## 2018-04-23 NOTE — PROGRESS NOTE ADULT - ASSESSMENT
61 y/o F with h/o CHF, HTN, DM, asthma, thyroid Ca presents to the ED for shortness of breath. Pt states she has been getting increasingly short of breath for the past 2 weeks after she was discharged from [facility]. Pt states she has EDDY, shortness of breath at rest. Pt also has swelling to her abdomen, back and legs. Pt reports decreased urine output. Pt also states she was taking torsemide and started on lasix 40 mg BID. Pt denies LOC , syncope,fever, chills, N/V/D/C, chest pain, palpitations, numbness, tingling, headache, recent infection, urinary/bowel incontinence or any other complaints at this time.      Problem/Plan - 1:  ·  Problem: CHF exacerbation acute on chronic systolic .  Plan:  Continue with lasix 80 IV BID. Diuresing well.   Cardiology helping.   TTE pending.    Problem/Plan - 2:  ·  Problem: Hypocalcemia secondary to Hypoparathyroidism .  Plan: Renal and Endo helping . Replacing.   Continue to monitor calcium levels. Renal helping.      Problem/Plan - 3:  ·  Problem: DM (diabetes mellitus).  Plan: Sugars in good range. Endo helping .   Stop PO medications and start sliding scale.   Check hemoglobin a1c.   Low carb diet.      Problem/Plan - 4:  ·  Problem: Nonischemic Cardiomyopathy .  Plan: AICD interrogation noted.      Problem/Plan - 5:  ·  Problem: HTN (hypertension).  Plan: BP readings okay .   Continue with current medications.   Low salt,low cholesterol, DASH diet.     Problem/Plan - 6:  ·  Problem: Hyponatremia with Hypokalemia  .  Plan: Correcting . Renal helping.     Problem/Plan - 7:  ·  Problem: Abdominal Pain with abnormal LFT .  Plan: US abdomen noted .  Likely secondary to Hepatic congestion from CHF.     Problem/Plan - 8:  ·  Problem: Gout flare up .  Plan: Ankle pain resolved . Will need Allopurinol once pain resolved. Foot pain so will get Duplex to R/O PVD.     Problem/Plan - 9:  ·  Problem: Need for prophylactic measure.  Plan: Heparin SQ for vte prophylaxis.

## 2018-04-24 LAB
ALBUMIN SERPL ELPH-MCNC: 3.2 G/DL — LOW (ref 3.3–5)
ALP SERPL-CCNC: 279 U/L — HIGH (ref 40–120)
ALT FLD-CCNC: 70 U/L — HIGH (ref 4–33)
AST SERPL-CCNC: 89 U/L — HIGH (ref 4–32)
BASOPHILS # BLD AUTO: 0.11 K/UL — SIGNIFICANT CHANGE UP (ref 0–0.2)
BASOPHILS NFR BLD AUTO: 1.7 % — SIGNIFICANT CHANGE UP (ref 0–2)
BILIRUB DIRECT SERPL-MCNC: 1.3 MG/DL — HIGH (ref 0.1–0.2)
BILIRUB SERPL-MCNC: 2.3 MG/DL — HIGH (ref 0.2–1.2)
BUN SERPL-MCNC: 27 MG/DL — HIGH (ref 7–23)
CA-I BLD-SCNC: SIGNIFICANT CHANGE UP MMOL/L (ref 1.03–1.23)
CALCIUM SERPL-MCNC: 7.3 MG/DL — LOW (ref 8.4–10.5)
CHLORIDE SERPL-SCNC: 87 MMOL/L — LOW (ref 98–107)
CO2 SERPL-SCNC: 27 MMOL/L — SIGNIFICANT CHANGE UP (ref 22–31)
CREAT SERPL-MCNC: 1.28 MG/DL — SIGNIFICANT CHANGE UP (ref 0.5–1.3)
EOSINOPHIL # BLD AUTO: 0.09 K/UL — SIGNIFICANT CHANGE UP (ref 0–0.5)
EOSINOPHIL NFR BLD AUTO: 1.4 % — SIGNIFICANT CHANGE UP (ref 0–6)
GLUCOSE BLDC GLUCOMTR-MCNC: 113 MG/DL — HIGH (ref 70–99)
GLUCOSE BLDC GLUCOMTR-MCNC: 137 MG/DL — HIGH (ref 70–99)
GLUCOSE BLDC GLUCOMTR-MCNC: 176 MG/DL — HIGH (ref 70–99)
GLUCOSE BLDC GLUCOMTR-MCNC: 233 MG/DL — HIGH (ref 70–99)
GLUCOSE SERPL-MCNC: 115 MG/DL — HIGH (ref 70–99)
HCT VFR BLD CALC: 37.4 % — SIGNIFICANT CHANGE UP (ref 34.5–45)
HGB BLD-MCNC: 12.3 G/DL — SIGNIFICANT CHANGE UP (ref 11.5–15.5)
IMM GRANULOCYTES # BLD AUTO: 0.03 # — SIGNIFICANT CHANGE UP
IMM GRANULOCYTES NFR BLD AUTO: 0.5 % — SIGNIFICANT CHANGE UP (ref 0–1.5)
LYMPHOCYTES # BLD AUTO: 1.45 K/UL — SIGNIFICANT CHANGE UP (ref 1–3.3)
LYMPHOCYTES # BLD AUTO: 22.6 % — SIGNIFICANT CHANGE UP (ref 13–44)
MAGNESIUM SERPL-MCNC: 1.8 MG/DL — SIGNIFICANT CHANGE UP (ref 1.6–2.6)
MCHC RBC-ENTMCNC: 23.9 PG — LOW (ref 27–34)
MCHC RBC-ENTMCNC: 32.9 % — SIGNIFICANT CHANGE UP (ref 32–36)
MCV RBC AUTO: 72.6 FL — LOW (ref 80–100)
MONOCYTES # BLD AUTO: 0.6 K/UL — SIGNIFICANT CHANGE UP (ref 0–0.9)
MONOCYTES NFR BLD AUTO: 9.3 % — SIGNIFICANT CHANGE UP (ref 2–14)
NEUTROPHILS # BLD AUTO: 4.14 K/UL — SIGNIFICANT CHANGE UP (ref 1.8–7.4)
NEUTROPHILS NFR BLD AUTO: 64.5 % — SIGNIFICANT CHANGE UP (ref 43–77)
NRBC # FLD: 0 — SIGNIFICANT CHANGE UP
PHOSPHATE SERPL-MCNC: 5.2 MG/DL — HIGH (ref 2.5–4.5)
PLATELET # BLD AUTO: 222 K/UL — SIGNIFICANT CHANGE UP (ref 150–400)
PMV BLD: 11.2 FL — SIGNIFICANT CHANGE UP (ref 7–13)
POTASSIUM SERPL-MCNC: 4.1 MMOL/L — SIGNIFICANT CHANGE UP (ref 3.5–5.3)
POTASSIUM SERPL-SCNC: 4.1 MMOL/L — SIGNIFICANT CHANGE UP (ref 3.5–5.3)
PROT SERPL-MCNC: 6.8 G/DL — SIGNIFICANT CHANGE UP (ref 6–8.3)
RBC # BLD: 5.15 M/UL — SIGNIFICANT CHANGE UP (ref 3.8–5.2)
RBC # FLD: 19.9 % — HIGH (ref 10.3–14.5)
SODIUM SERPL-SCNC: 132 MMOL/L — LOW (ref 135–145)
WBC # BLD: 6.42 K/UL — SIGNIFICANT CHANGE UP (ref 3.8–10.5)
WBC # FLD AUTO: 6.42 K/UL — SIGNIFICANT CHANGE UP (ref 3.8–10.5)

## 2018-04-24 PROCEDURE — 93923 UPR/LXTR ART STDY 3+ LVLS: CPT | Mod: 26

## 2018-04-24 RX ORDER — CALCITRIOL 0.5 UG/1
0.5 CAPSULE ORAL
Qty: 0 | Refills: 0 | Status: DISCONTINUED | OUTPATIENT
Start: 2018-04-24 | End: 2018-04-26

## 2018-04-24 RX ORDER — ACETAMINOPHEN 500 MG
650 TABLET ORAL EVERY 6 HOURS
Qty: 0 | Refills: 0 | Status: DISCONTINUED | OUTPATIENT
Start: 2018-04-24 | End: 2018-04-26

## 2018-04-24 RX ADMIN — Medication 2 TABLET(S): at 17:30

## 2018-04-24 RX ADMIN — Medication 1000 UNIT(S): at 13:01

## 2018-04-24 RX ADMIN — MAGNESIUM OXIDE 400 MG ORAL TABLET 400 MILLIGRAM(S): 241.3 TABLET ORAL at 13:01

## 2018-04-24 RX ADMIN — Medication 650 MILLIGRAM(S): at 08:49

## 2018-04-24 RX ADMIN — Medication 1334 MILLIGRAM(S): at 17:30

## 2018-04-24 RX ADMIN — Medication 80 MILLIGRAM(S): at 05:57

## 2018-04-24 RX ADMIN — Medication 1 APPLICATION(S): at 05:57

## 2018-04-24 RX ADMIN — Medication 650 MILLIGRAM(S): at 09:44

## 2018-04-24 RX ADMIN — Medication 2 TABLET(S): at 05:56

## 2018-04-24 RX ADMIN — CARVEDILOL PHOSPHATE 3.12 MILLIGRAM(S): 80 CAPSULE, EXTENDED RELEASE ORAL at 17:31

## 2018-04-24 RX ADMIN — CARVEDILOL PHOSPHATE 3.12 MILLIGRAM(S): 80 CAPSULE, EXTENDED RELEASE ORAL at 05:57

## 2018-04-24 RX ADMIN — LISINOPRIL 10 MILLIGRAM(S): 2.5 TABLET ORAL at 05:56

## 2018-04-24 RX ADMIN — CALCITRIOL 0.5 MICROGRAM(S): 0.5 CAPSULE ORAL at 17:30

## 2018-04-24 RX ADMIN — Medication 1 APPLICATION(S): at 17:31

## 2018-04-24 RX ADMIN — Medication 1: at 13:00

## 2018-04-24 RX ADMIN — CALCITRIOL 0.5 MICROGRAM(S): 0.5 CAPSULE ORAL at 13:01

## 2018-04-24 RX ADMIN — MAGNESIUM OXIDE 400 MG ORAL TABLET 400 MILLIGRAM(S): 241.3 TABLET ORAL at 17:30

## 2018-04-24 RX ADMIN — Medication 1334 MILLIGRAM(S): at 13:00

## 2018-04-24 RX ADMIN — Medication 0.6 MILLIGRAM(S): at 17:30

## 2018-04-24 RX ADMIN — Medication 80 MILLIGRAM(S): at 17:31

## 2018-04-24 RX ADMIN — INSULIN GLARGINE 6 UNIT(S): 100 INJECTION, SOLUTION SUBCUTANEOUS at 22:08

## 2018-04-24 RX ADMIN — Medication 81 MILLIGRAM(S): at 13:01

## 2018-04-24 RX ADMIN — Medication 150 MICROGRAM(S): at 05:57

## 2018-04-24 RX ADMIN — MAGNESIUM OXIDE 400 MG ORAL TABLET 400 MILLIGRAM(S): 241.3 TABLET ORAL at 08:49

## 2018-04-24 RX ADMIN — Medication 0.6 MILLIGRAM(S): at 05:57

## 2018-04-24 RX ADMIN — Medication 1334 MILLIGRAM(S): at 08:49

## 2018-04-24 NOTE — PROVIDER CONTACT NOTE (MEDICATION) - ASSESSMENT
Patient in NAD. Refusing Heparin SQ due to the fact that she ambulates frequently and famotidine due to the fact that as per patient it causes diarrhea.

## 2018-04-24 NOTE — PROGRESS NOTE ADULT - ASSESSMENT
63 y/o F with h/o CHF, HTN, DM, asthma, thyroid Ca presents to the ED for shortness of breath. Pt states she has been getting increasingly short of breath for the past 2 weeks after she was discharged from [facility]. Pt states she has EDDY, shortness of breath at rest. Pt also has swelling to her abdomen, back and legs. Pt reports decreased urine output. Pt also states she was taking torsemide and started on lasix 40 mg BID. Pt denies LOC , syncope,fever, chills, N/V/D/C, chest pain, palpitations, numbness, tingling, headache, recent infection, urinary/bowel incontinence or any other complaints at this time.      Problem/Plan - 1:  ·  Problem: CHF exacerbation acute on chronic systolic .  Plan:  Clinically getting better.   Continue with lasix 80 IV BID. Diuresing well.   Cardiology helping.   TTE pending.    Problem/Plan - 2:  ·  Problem: Hypocalcemia secondary to Hypoparathyroidism .  Plan: Renal and Endo helping . Replacing.   Continue to monitor calcium levels. Renal helping.      Problem/Plan - 3:  ·  Problem: DM (diabetes mellitus).  Plan: Sugars in good range. Endo helping .   Stop PO medications and start sliding scale.   Check hemoglobin a1c.   Low carb diet.      Problem/Plan - 4:  ·  Problem: Nonischemic Cardiomyopathy .  Plan: AICD interrogation noted.      Problem/Plan - 5:  ·  Problem: HTN (hypertension).  Plan: BP readings okay .   Continue with current medications.   Low salt,low cholesterol, DASH diet.     Problem/Plan - 6:  ·  Problem: Hyponatremia with Hypokalemia  .  Plan: Correcting . Renal helping.     Problem/Plan - 7:  ·  Problem: Abdominal Pain with abnormal LFT .  Plan: trending down. US abdomen noted .  Likely secondary to Hepatic congestion from CHF.     Problem/Plan - 8:  ·  Problem: Gout flare up .  Plan: Ankle pain resolved . Will need Allopurinol once pain resolved. PARVIN noted.No PVD.     Problem/Plan - 9:  ·  Problem: Need for prophylactic measure.  Plan: Heparin SQ for vte prophylaxis.

## 2018-04-25 LAB
ALBUMIN SERPL ELPH-MCNC: 3.5 G/DL — SIGNIFICANT CHANGE UP (ref 3.3–5)
ALP SERPL-CCNC: 308 U/L — HIGH (ref 40–120)
ALT FLD-CCNC: 71 U/L — HIGH (ref 4–33)
AST SERPL-CCNC: 82 U/L — HIGH (ref 4–32)
BASOPHILS # BLD AUTO: 0.09 K/UL — SIGNIFICANT CHANGE UP (ref 0–0.2)
BASOPHILS NFR BLD AUTO: 1.5 % — SIGNIFICANT CHANGE UP (ref 0–2)
BILIRUB DIRECT SERPL-MCNC: 1.6 MG/DL — HIGH (ref 0.1–0.2)
BILIRUB SERPL-MCNC: 2.4 MG/DL — HIGH (ref 0.2–1.2)
BUN SERPL-MCNC: 29 MG/DL — HIGH (ref 7–23)
CA-I BLD-SCNC: 0.84 MMOL/L — LOW (ref 1.03–1.23)
CALCIUM SERPL-MCNC: 7.5 MG/DL — LOW (ref 8.4–10.5)
CHLORIDE SERPL-SCNC: 89 MMOL/L — LOW (ref 98–107)
CO2 SERPL-SCNC: 29 MMOL/L — SIGNIFICANT CHANGE UP (ref 22–31)
CREAT SERPL-MCNC: 1.31 MG/DL — HIGH (ref 0.5–1.3)
EOSINOPHIL # BLD AUTO: 0.11 K/UL — SIGNIFICANT CHANGE UP (ref 0–0.5)
EOSINOPHIL NFR BLD AUTO: 1.9 % — SIGNIFICANT CHANGE UP (ref 0–6)
GLUCOSE BLDC GLUCOMTR-MCNC: 112 MG/DL — HIGH (ref 70–99)
GLUCOSE BLDC GLUCOMTR-MCNC: 151 MG/DL — HIGH (ref 70–99)
GLUCOSE BLDC GLUCOMTR-MCNC: 178 MG/DL — HIGH (ref 70–99)
GLUCOSE BLDC GLUCOMTR-MCNC: 190 MG/DL — HIGH (ref 70–99)
GLUCOSE SERPL-MCNC: 110 MG/DL — HIGH (ref 70–99)
HCT VFR BLD CALC: 37.9 % — SIGNIFICANT CHANGE UP (ref 34.5–45)
HGB BLD-MCNC: 12.4 G/DL — SIGNIFICANT CHANGE UP (ref 11.5–15.5)
IMM GRANULOCYTES # BLD AUTO: 0.03 # — SIGNIFICANT CHANGE UP
IMM GRANULOCYTES NFR BLD AUTO: 0.5 % — SIGNIFICANT CHANGE UP (ref 0–1.5)
LYMPHOCYTES # BLD AUTO: 1.23 K/UL — SIGNIFICANT CHANGE UP (ref 1–3.3)
LYMPHOCYTES # BLD AUTO: 20.8 % — SIGNIFICANT CHANGE UP (ref 13–44)
MAGNESIUM SERPL-MCNC: 1.8 MG/DL — SIGNIFICANT CHANGE UP (ref 1.6–2.6)
MCHC RBC-ENTMCNC: 24 PG — LOW (ref 27–34)
MCHC RBC-ENTMCNC: 32.7 % — SIGNIFICANT CHANGE UP (ref 32–36)
MCV RBC AUTO: 73.4 FL — LOW (ref 80–100)
MONOCYTES # BLD AUTO: 0.65 K/UL — SIGNIFICANT CHANGE UP (ref 0–0.9)
MONOCYTES NFR BLD AUTO: 11 % — SIGNIFICANT CHANGE UP (ref 2–14)
NEUTROPHILS # BLD AUTO: 3.8 K/UL — SIGNIFICANT CHANGE UP (ref 1.8–7.4)
NEUTROPHILS NFR BLD AUTO: 64.3 % — SIGNIFICANT CHANGE UP (ref 43–77)
NRBC # FLD: 0 — SIGNIFICANT CHANGE UP
PLATELET # BLD AUTO: 221 K/UL — SIGNIFICANT CHANGE UP (ref 150–400)
PMV BLD: 11.1 FL — SIGNIFICANT CHANGE UP (ref 7–13)
POTASSIUM SERPL-MCNC: 3.8 MMOL/L — SIGNIFICANT CHANGE UP (ref 3.5–5.3)
POTASSIUM SERPL-SCNC: 3.8 MMOL/L — SIGNIFICANT CHANGE UP (ref 3.5–5.3)
PROT SERPL-MCNC: 7.1 G/DL — SIGNIFICANT CHANGE UP (ref 6–8.3)
RBC # BLD: 5.16 M/UL — SIGNIFICANT CHANGE UP (ref 3.8–5.2)
RBC # FLD: 19.6 % — HIGH (ref 10.3–14.5)
SODIUM SERPL-SCNC: 135 MMOL/L — SIGNIFICANT CHANGE UP (ref 135–145)
WBC # BLD: 5.91 K/UL — SIGNIFICANT CHANGE UP (ref 3.8–10.5)
WBC # FLD AUTO: 5.91 K/UL — SIGNIFICANT CHANGE UP (ref 3.8–10.5)

## 2018-04-25 RX ORDER — CALCIUM GLUCONATE 100 MG/ML
1 VIAL (ML) INTRAVENOUS ONCE
Qty: 0 | Refills: 0 | Status: COMPLETED | OUTPATIENT
Start: 2018-04-25 | End: 2018-04-25

## 2018-04-25 RX ADMIN — Medication 150 MICROGRAM(S): at 05:42

## 2018-04-25 RX ADMIN — Medication 80 MILLIGRAM(S): at 17:11

## 2018-04-25 RX ADMIN — Medication 1 APPLICATION(S): at 17:11

## 2018-04-25 RX ADMIN — Medication 1334 MILLIGRAM(S): at 13:15

## 2018-04-25 RX ADMIN — MAGNESIUM OXIDE 400 MG ORAL TABLET 400 MILLIGRAM(S): 241.3 TABLET ORAL at 13:16

## 2018-04-25 RX ADMIN — Medication 2 TABLET(S): at 17:11

## 2018-04-25 RX ADMIN — MAGNESIUM OXIDE 400 MG ORAL TABLET 400 MILLIGRAM(S): 241.3 TABLET ORAL at 17:11

## 2018-04-25 RX ADMIN — Medication 1 APPLICATION(S): at 05:42

## 2018-04-25 RX ADMIN — Medication 1: at 18:02

## 2018-04-25 RX ADMIN — INSULIN GLARGINE 6 UNIT(S): 100 INJECTION, SOLUTION SUBCUTANEOUS at 22:00

## 2018-04-25 RX ADMIN — CALCITRIOL 0.5 MICROGRAM(S): 0.5 CAPSULE ORAL at 05:42

## 2018-04-25 RX ADMIN — Medication 1334 MILLIGRAM(S): at 09:02

## 2018-04-25 RX ADMIN — MAGNESIUM OXIDE 400 MG ORAL TABLET 400 MILLIGRAM(S): 241.3 TABLET ORAL at 09:02

## 2018-04-25 RX ADMIN — CARVEDILOL PHOSPHATE 3.12 MILLIGRAM(S): 80 CAPSULE, EXTENDED RELEASE ORAL at 17:11

## 2018-04-25 RX ADMIN — Medication 200 GRAM(S): at 13:15

## 2018-04-25 RX ADMIN — Medication 1: at 13:16

## 2018-04-25 RX ADMIN — Medication 80 MILLIGRAM(S): at 05:43

## 2018-04-25 RX ADMIN — LISINOPRIL 10 MILLIGRAM(S): 2.5 TABLET ORAL at 05:43

## 2018-04-25 RX ADMIN — Medication 81 MILLIGRAM(S): at 13:15

## 2018-04-25 RX ADMIN — FAMOTIDINE 20 MILLIGRAM(S): 10 INJECTION INTRAVENOUS at 05:43

## 2018-04-25 RX ADMIN — Medication 2 TABLET(S): at 05:43

## 2018-04-25 RX ADMIN — CALCITRIOL 0.5 MICROGRAM(S): 0.5 CAPSULE ORAL at 17:11

## 2018-04-25 RX ADMIN — Medication 1000 UNIT(S): at 13:16

## 2018-04-25 RX ADMIN — CARVEDILOL PHOSPHATE 3.12 MILLIGRAM(S): 80 CAPSULE, EXTENDED RELEASE ORAL at 05:43

## 2018-04-25 RX ADMIN — Medication 1334 MILLIGRAM(S): at 17:11

## 2018-04-25 RX ADMIN — Medication 0.6 MILLIGRAM(S): at 05:43

## 2018-04-25 NOTE — PROGRESS NOTE ADULT - ASSESSMENT
61 y/o F with h/o CHF, HTN, DM, asthma, thyroid Ca presents to the ED for shortness of breath. Pt states she has been getting increasingly short of breath for the past 2 weeks after she was discharged from [facility]. Pt states she has EDDY, shortness of breath at rest. Pt also has swelling to her abdomen, back and legs. Pt reports decreased urine output. Pt also states she was taking torsemide and started on lasix 40 mg BID. Pt denies LOC , syncope,fever, chills, N/V/D/C, chest pain, palpitations, numbness, tingling, headache, recent infection, urinary/bowel incontinence or any other complaints at this time.      Problem/Plan - 1:  ·  Problem: CHF exacerbation acute on chronic systolic .  Plan:  Clinically getting better.   Continue with lasix 80 IV BID. Diuresing well.   Cardiology helping.   TTE pending.    Problem/Plan - 2:  ·  Problem: Hypocalcemia secondary to Hypoparathyroidism .  Plan: Renal and Endo helping . Replacing IV .   Continue to monitor calcium levels.      Problem/Plan - 3:  ·  Problem: DM (diabetes mellitus).  Plan: Sugars in good range. Endo helping .   Stop PO medications and start sliding scale.   Check hemoglobin a1c.   Low carb diet.      Problem/Plan - 4:  ·  Problem: Nonischemic Cardiomyopathy .  Plan: AICD interrogation noted.      Problem/Plan - 5:  ·  Problem: HTN (hypertension).  Plan: BP readings okay .   Continue with current medications.   Low salt,low cholesterol, DASH diet.     Problem/Plan - 6:  ·  Problem: Hyponatremia with Hypokalemia  .  Plan: Correcting . Renal helping.     Problem/Plan - 7:  ·  Problem: Abdominal Pain with abnormal LFT .  Plan: trending down. US abdomen noted .  Likely secondary to Hepatic congestion from CHF.     Problem/Plan - 8:  ·  Problem: Gout flare up .  Plan: Ankle pain resolved . Will need Allopurinol once pain resolved. PARVIN noted.No PVD.     Problem/Plan - 9:  ·  Problem: Need for prophylactic measure.  Plan: Heparin SQ for vte prophylaxis.     Disposition : DC planning .

## 2018-04-26 ENCOUNTER — TRANSCRIPTION ENCOUNTER (OUTPATIENT)
Age: 63
End: 2018-04-26

## 2018-04-26 VITALS
RESPIRATION RATE: 16 BRPM | DIASTOLIC BLOOD PRESSURE: 74 MMHG | OXYGEN SATURATION: 100 % | HEART RATE: 81 BPM | SYSTOLIC BLOOD PRESSURE: 120 MMHG

## 2018-04-26 LAB
ALBUMIN SERPL ELPH-MCNC: 3.1 G/DL — LOW (ref 3.3–5)
ALP SERPL-CCNC: 280 U/L — HIGH (ref 40–120)
ALT FLD-CCNC: 75 U/L — HIGH (ref 4–33)
AST SERPL-CCNC: 84 U/L — HIGH (ref 4–32)
BASOPHILS # BLD AUTO: 0.08 K/UL — SIGNIFICANT CHANGE UP (ref 0–0.2)
BASOPHILS NFR BLD AUTO: 1.6 % — SIGNIFICANT CHANGE UP (ref 0–2)
BILIRUB DIRECT SERPL-MCNC: 1.5 MG/DL — HIGH (ref 0.1–0.2)
BILIRUB SERPL-MCNC: 2.3 MG/DL — HIGH (ref 0.2–1.2)
BUN SERPL-MCNC: 27 MG/DL — HIGH (ref 7–23)
CA-I BLD-SCNC: 0.79 MMOL/L — CRITICAL LOW (ref 1.03–1.23)
CALCIUM SERPL-MCNC: 7.2 MG/DL — LOW (ref 8.4–10.5)
CHLORIDE SERPL-SCNC: 89 MMOL/L — LOW (ref 98–107)
CO2 SERPL-SCNC: 30 MMOL/L — SIGNIFICANT CHANGE UP (ref 22–31)
CREAT SERPL-MCNC: 1.22 MG/DL — SIGNIFICANT CHANGE UP (ref 0.5–1.3)
EOSINOPHIL # BLD AUTO: 0.12 K/UL — SIGNIFICANT CHANGE UP (ref 0–0.5)
EOSINOPHIL NFR BLD AUTO: 2.4 % — SIGNIFICANT CHANGE UP (ref 0–6)
GLUCOSE BLDC GLUCOMTR-MCNC: 193 MG/DL — HIGH (ref 70–99)
GLUCOSE BLDC GLUCOMTR-MCNC: 257 MG/DL — HIGH (ref 70–99)
GLUCOSE BLDC GLUCOMTR-MCNC: 98 MG/DL — SIGNIFICANT CHANGE UP (ref 70–99)
GLUCOSE SERPL-MCNC: 117 MG/DL — HIGH (ref 70–99)
HCT VFR BLD CALC: 36.2 % — SIGNIFICANT CHANGE UP (ref 34.5–45)
HGB BLD-MCNC: 11.8 G/DL — SIGNIFICANT CHANGE UP (ref 11.5–15.5)
IMM GRANULOCYTES # BLD AUTO: 0.02 # — SIGNIFICANT CHANGE UP
IMM GRANULOCYTES NFR BLD AUTO: 0.4 % — SIGNIFICANT CHANGE UP (ref 0–1.5)
LYMPHOCYTES # BLD AUTO: 1.15 K/UL — SIGNIFICANT CHANGE UP (ref 1–3.3)
LYMPHOCYTES # BLD AUTO: 22.9 % — SIGNIFICANT CHANGE UP (ref 13–44)
MAGNESIUM SERPL-MCNC: 1.8 MG/DL — SIGNIFICANT CHANGE UP (ref 1.6–2.6)
MCHC RBC-ENTMCNC: 24.1 PG — LOW (ref 27–34)
MCHC RBC-ENTMCNC: 32.6 % — SIGNIFICANT CHANGE UP (ref 32–36)
MCV RBC AUTO: 73.9 FL — LOW (ref 80–100)
MONOCYTES # BLD AUTO: 0.58 K/UL — SIGNIFICANT CHANGE UP (ref 0–0.9)
MONOCYTES NFR BLD AUTO: 11.6 % — SIGNIFICANT CHANGE UP (ref 2–14)
NEUTROPHILS # BLD AUTO: 3.07 K/UL — SIGNIFICANT CHANGE UP (ref 1.8–7.4)
NEUTROPHILS NFR BLD AUTO: 61.1 % — SIGNIFICANT CHANGE UP (ref 43–77)
NRBC # FLD: 0 — SIGNIFICANT CHANGE UP
PHOSPHATE SERPL-MCNC: 4.9 MG/DL — HIGH (ref 2.5–4.5)
PLATELET # BLD AUTO: 209 K/UL — SIGNIFICANT CHANGE UP (ref 150–400)
PMV BLD: 11.1 FL — SIGNIFICANT CHANGE UP (ref 7–13)
POTASSIUM SERPL-MCNC: 3.4 MMOL/L — LOW (ref 3.5–5.3)
POTASSIUM SERPL-SCNC: 3.4 MMOL/L — LOW (ref 3.5–5.3)
PROT SERPL-MCNC: 6.5 G/DL — SIGNIFICANT CHANGE UP (ref 6–8.3)
RBC # BLD: 4.9 M/UL — SIGNIFICANT CHANGE UP (ref 3.8–5.2)
RBC # FLD: 19.6 % — HIGH (ref 10.3–14.5)
SODIUM SERPL-SCNC: 134 MMOL/L — LOW (ref 135–145)
WBC # BLD: 5.02 K/UL — SIGNIFICANT CHANGE UP (ref 3.8–10.5)
WBC # FLD AUTO: 5.02 K/UL — SIGNIFICANT CHANGE UP (ref 3.8–10.5)

## 2018-04-26 PROCEDURE — 93306 TTE W/DOPPLER COMPLETE: CPT | Mod: 26

## 2018-04-26 RX ORDER — POTASSIUM CHLORIDE 20 MEQ
20 PACKET (EA) ORAL ONCE
Qty: 0 | Refills: 0 | Status: COMPLETED | OUTPATIENT
Start: 2018-04-26 | End: 2018-04-26

## 2018-04-26 RX ORDER — FUROSEMIDE 40 MG
2 TABLET ORAL
Qty: 120 | Refills: 0 | OUTPATIENT
Start: 2018-04-26 | End: 2018-05-25

## 2018-04-26 RX ORDER — COLCHICINE 0.6 MG
1 TABLET ORAL
Qty: 10 | Refills: 0 | OUTPATIENT
Start: 2018-04-26 | End: 2018-04-30

## 2018-04-26 RX ORDER — CALCIUM CARBONATE 500(1250)
2 TABLET ORAL THREE TIMES A DAY
Qty: 0 | Refills: 0 | Status: DISCONTINUED | OUTPATIENT
Start: 2018-04-26 | End: 2018-04-26

## 2018-04-26 RX ORDER — CALCIUM GLUCONATE 100 MG/ML
1 VIAL (ML) INTRAVENOUS ONCE
Qty: 0 | Refills: 0 | Status: COMPLETED | OUTPATIENT
Start: 2018-04-26 | End: 2018-04-26

## 2018-04-26 RX ORDER — CALCIUM CARBONATE 500(1250)
2 TABLET ORAL
Qty: 180 | Refills: 0 | OUTPATIENT
Start: 2018-04-26 | End: 2018-05-25

## 2018-04-26 RX ORDER — CALCIUM ACETATE 667 MG
2 TABLET ORAL
Qty: 180 | Refills: 0 | OUTPATIENT
Start: 2018-04-26 | End: 2018-05-25

## 2018-04-26 RX ADMIN — CALCITRIOL 0.5 MICROGRAM(S): 0.5 CAPSULE ORAL at 06:19

## 2018-04-26 RX ADMIN — CARVEDILOL PHOSPHATE 3.12 MILLIGRAM(S): 80 CAPSULE, EXTENDED RELEASE ORAL at 06:19

## 2018-04-26 RX ADMIN — MAGNESIUM OXIDE 400 MG ORAL TABLET 400 MILLIGRAM(S): 241.3 TABLET ORAL at 18:06

## 2018-04-26 RX ADMIN — Medication 1334 MILLIGRAM(S): at 09:36

## 2018-04-26 RX ADMIN — Medication 80 MILLIGRAM(S): at 06:19

## 2018-04-26 RX ADMIN — MAGNESIUM OXIDE 400 MG ORAL TABLET 400 MILLIGRAM(S): 241.3 TABLET ORAL at 13:43

## 2018-04-26 RX ADMIN — Medication 3: at 13:47

## 2018-04-26 RX ADMIN — Medication 2 TABLET(S): at 06:19

## 2018-04-26 RX ADMIN — CALCITRIOL 0.5 MICROGRAM(S): 0.5 CAPSULE ORAL at 17:54

## 2018-04-26 RX ADMIN — Medication 150 MICROGRAM(S): at 06:18

## 2018-04-26 RX ADMIN — FAMOTIDINE 20 MILLIGRAM(S): 10 INJECTION INTRAVENOUS at 06:19

## 2018-04-26 RX ADMIN — Medication 81 MILLIGRAM(S): at 09:37

## 2018-04-26 RX ADMIN — MAGNESIUM OXIDE 400 MG ORAL TABLET 400 MILLIGRAM(S): 241.3 TABLET ORAL at 09:36

## 2018-04-26 RX ADMIN — Medication 2 TABLET(S): at 13:43

## 2018-04-26 RX ADMIN — Medication 1334 MILLIGRAM(S): at 17:53

## 2018-04-26 RX ADMIN — Medication 0.6 MILLIGRAM(S): at 06:19

## 2018-04-26 RX ADMIN — LISINOPRIL 10 MILLIGRAM(S): 2.5 TABLET ORAL at 06:19

## 2018-04-26 RX ADMIN — Medication 1334 MILLIGRAM(S): at 13:43

## 2018-04-26 RX ADMIN — Medication 200 GRAM(S): at 09:35

## 2018-04-26 RX ADMIN — Medication 1000 UNIT(S): at 09:37

## 2018-04-26 RX ADMIN — Medication 1: at 18:06

## 2018-04-26 RX ADMIN — Medication 0.6 MILLIGRAM(S): at 17:54

## 2018-04-26 NOTE — DISCHARGE NOTE ADULT - OTHER SIGNIFICANT FINDINGS
Labs:   H/H 13.0/38.3  Na 124>127  Ca 6.3-> 6.5   BUN/Cr 27/1.20  Bili 3.3    AST 56 ALT 39   CXR: clear lungs   Ion Ca+ 0.68-->supplementing  4/19-- s/p AICD interrogation -- Normal ICD function. Normal sensing and pacing via iterative testing. Good battery status. Excellent threshold capture.  No reprogramming.    4/19: Normal PARVIN/PVR study.

## 2018-04-26 NOTE — DISCHARGE NOTE ADULT - MEDICATION SUMMARY - MEDICATIONS TO TAKE
I will START or STAY ON the medications listed below when I get home from the hospital:    aspirin 81 mg oral delayed release tablet  -- 1 tab(s) by mouth once a day  -- Indication: For preventative    lisinopril 10 mg oral tablet  -- 1 tab(s) by mouth once a day  -- Indication: For HTN (hypertension)    calcium carbonate 1250 mg (500 mg elemental calcium) oral tablet  -- 2 tab(s) by mouth 3 times a day  -- Indication: For supplement    Tradjenta 5 mg oral tablet  -- 1 tab(s) by mouth once a day  -- Indication: For DM (diabetes mellitus)    colchicine 0.6 mg oral tablet  -- 1 tab(s) by mouth 2 times a day  -- Indication: For gout    carvedilol 3.125 mg oral tablet  -- 1 tab(s) by mouth every 12 hours  -- Indication: For HTN (hypertension)    torsemide 10 mg oral tablet  -- alternating 2-3 tab(s) by mouth every other day    -- Indication: For CHF exacerbation    furosemide 40 mg oral tablet  -- 2 tab(s) by mouth 2 times a day   -- Avoid prolonged or excessive exposure to direct and/or artificial sunlight while taking this medication.  It is very important that you take or use this exactly as directed.  Do not skip doses or discontinue unless directed by your doctor.  It may be advisable to drink a full glass orange juice or eat a banana daily while taking this medication.    -- Indication: For CHF (congestive heart failure)    famotidine 20 mg oral tablet  -- 1 tab(s) by mouth 2 times a day  -- Indication: For GERD    magnesium oxide 400 mg (241.3 mg elemental magnesium) oral tablet  -- 1 tab(s) by mouth 3 times a day (with meals)  -- Indication: For supplement    calcium acetate 667 mg oral tablet  -- 2 tab(s) by mouth 3 times a day (with meals)   -- Indication: For supplement    levothyroxine 150 mcg (0.15 mg) oral tablet  -- 1 tab(s) by mouth once a day  -- Indication: For Hypothyroidism    calcitriol 0.5 mcg oral capsule  -- 1 cap(s) by mouth once a day, 1/2 a cap at night  -- Indication: For supplement    Vitamin D3 1000 intl units oral tablet  -- 1 tab(s) by mouth once a day  -- Indication: For supplement

## 2018-04-26 NOTE — DISCHARGE NOTE ADULT - CARE PLAN
Principal Discharge DX:	CHF exacerbation  Goal:	To relieve and prevent worsening symptoms associated with congestive heart failure, to improve quality of life, and to treat underlying conditions such as coronary heart disease, high blood pressure, or diabetes, and to maintain euvolemia.  Assessment and plan of treatment:	Low salt diet, fluid restriction to 1500 ml daily, monitor your fluid intake and weight daily, exercise as tolerated 30 minutes daily, and follow up with your physician within 1 to 2 weeks.  Secondary Diagnosis:	HTN (hypertension)  Goal:	To maintain a normal blood pressure to prevent heart attack, stroke and renal failure.  Assessment and plan of treatment:	Low sodium and fat diet, continue anti-hypertensive medications, and follow up with primary care physician.  Secondary Diagnosis:	DM (diabetes mellitus)  Goal:	To maintain a normal blood glucose level and HgA1C level < 5.7 and to prevent diabetic complications such as uncontrolled diabetes, diabetic coma, blindness, renal failure, and amputations.  Assessment and plan of treatment:	Monitor finger sticks pre-meal and bedtime, low salt, fat and carbohydrate diet, minimize glucose intake.  Exercise daily for at least 30 minutes and weight loss.  Follow up with primary care physician and endocrinologist for routine Hemoglobin A1C checks and management.  Follow up with your ophthalmologist for routine yearly vision exams.  Secondary Diagnosis:	Hypothyroidism Principal Discharge DX:	CHF exacerbation  Goal:	To relieve and prevent worsening symptoms associated with congestive heart failure, to improve quality of life, and to treat underlying conditions such as coronary heart disease, high blood pressure, or diabetes, and to maintain euvolemia.  Assessment and plan of treatment:	Low salt diet, fluid restriction to 1500 ml daily, monitor your fluid intake and weight daily, exercise as tolerated 30 minutes daily, and follow up with your physician within 1 to 2 weeks.  Secondary Diagnosis:	HTN (hypertension)  Goal:	To maintain a normal blood pressure to prevent heart attack, stroke and renal failure.  Assessment and plan of treatment:	Low sodium and fat diet, continue anti-hypertensive medications, and follow up with primary care physician.  Secondary Diagnosis:	DM (diabetes mellitus)  Goal:	To maintain a normal blood glucose level and HgA1C level < 5.7 and to prevent diabetic complications such as uncontrolled diabetes, diabetic coma, blindness, renal failure, and amputations.  Assessment and plan of treatment:	Monitor finger sticks pre-meal and bedtime, low salt, fat and carbohydrate diet, minimize glucose intake.  Exercise daily for at least 30 minutes and weight loss.  Follow up with primary care physician and endocrinologist for routine Hemoglobin A1C checks and management.  Follow up with your ophthalmologist for routine yearly vision exams.  Secondary Diagnosis:	Hypothyroidism  Assessment and plan of treatment:	continue current meds

## 2018-04-26 NOTE — DISCHARGE NOTE ADULT - HOSPITAL COURSE
61 y/o F with h/o CHF, HTN, DM, asthma, thyroid Ca presents to the ED for shortness of breath. Pt states she has been getting increasingly short of breath for the past 2 weeks after she was discharged from [facility]. Pt states she has EDDY, shortness of breath at rest. Pt also has swelling to her abdomen, back and legs. Pt reports decreased urine output. Pt also states she was taking torsemide and started on lasix 40 mg BID. Pt denies LOC , syncope,fever, chills, N/V/D/C, chest pain, palpitations, numbness, tingling, headache, recent infection, urinary/bowel incontinence or any other complaints at this time. Clinically getting better. Continue with lasix 80 IV BID. Diuresing well. Will change to PO Demadex on DC. Cardiology consulted. TTE pending. DC planning P Echo results.  Cath and Echo from previously requested from Dr. Edge's office. 61 y/o F with h/o CHF, HTN, DM, asthma, thyroid Ca presents to the ED for shortness of breath. Pt states she has been getting increasingly short of breath for the past 2 weeks after she was discharged from [facility]. Pt states she has EDDY, shortness of breath at rest. Pt also has swelling to her abdomen, back and legs. Pt reports decreased urine output. Pt also states she was taking torsemide and started on lasix 40 mg BID. Pt denies LOC , syncope,fever, chills, N/V/D/C, chest pain, palpitations, numbness, tingling, headache, recent infection, urinary/bowel incontinence or any other complaints at this time. Clinically getting better. Continue with lasix 80 IV BID. Diuresing well. Will change to PO Demadex on DC. Cardiology consulted. TTE pending. DC planning P Echo results.  Cath and Echo from previously requested from Dr. Edge's office.      4/26 Echo: CONCLUSIONS:  1. Mitral annular calcification, otherwise normal mitral  valve. Moderate mitral regurgitation.  2. Moderately dilated left atrium.  LA volume index = 47  cc/m2.  3. Mild left ventricular enlargement.  4. Severe global left ventricular systolic dysfunction.  Endocardial visualization enhanced with intravenous  injection of echo contrast (Definity).  No LV thrombus  seen.  5. Right ventricular enlargement with decreased right  ventricular systolic function.  A device wire is noted in  the right heart.  6. Estimated right ventricular systolic pressure equals 42  mm Hg, assuming right atrial pressure equals 10 mm Hg,  consistent with mild pulmonary hypertension.  7. Normal tricuspid valve.  Severe tricuspid regurgitation.  *** Compared with echocardiogram of 5/16/2017, left  ventricular systolic function has deteriorated.    Above findings were discussed with attendng, cardiology and patient.  Cardiology wanted HF evaluation but patient does not want to stay as she has a follow up with Dr. Edge her cardiologist tomorrow and is wanting to follow up in [place].  She is aware of not having a full work up here and is aware of risks including death, but is insisting she will follow up tomorrow with her doctor but does want to follow up with the heart failure clinic here.  We will give her the information for follow up and also will give heart failure her info and they will call her to set up appointment.  D/W attending and can DC pt.

## 2018-04-26 NOTE — DISCHARGE NOTE ADULT - MEDICATION SUMMARY - MEDICATIONS TO CHANGE
I will SWITCH the dose or number of times a day I take the medications listed below when I get home from the hospital:    furosemide 40 mg oral tablet  -- 1 tab(s) by mouth 2 times a day   -- Avoid prolonged or excessive exposure to direct and/or artificial sunlight while taking this medication.  It is very important that you take or use this exactly as directed.  Do not skip doses or discontinue unless directed by your doctor.  It may be advisable to drink a full glass orange juice or eat a banana daily while taking this medication.    calcium acetate 667 mg oral tablet  -- 1 tab(s) by mouth 3 times a day (with meals)

## 2018-04-26 NOTE — PROGRESS NOTE ADULT - ASSESSMENT
63 y/o F with h/o CHF, HTN, DM, asthma, thyroid Ca presents to the ED for shortness of breath. Pt states she has been getting increasingly short of breath for the past 2 weeks after she was discharged from [facility]. Pt states she has EDDY, shortness of breath at rest. Pt also has swelling to her abdomen, back and legs. Pt reports decreased urine output. Pt also states she was taking torsemide and started on lasix 40 mg BID. Pt denies LOC , syncope,fever, chills, N/V/D/C, chest pain, palpitations, numbness, tingling, headache, recent infection, urinary/bowel incontinence or any other complaints at this time.      Problem/Plan - 1:  ·  Problem: CHF exacerbation acute on chronic systolic .  Plan:  Clinically getting better.   Continue with lasix 80 IV BID. Diuresing well. Will change to PO Demadex on DC.   Cardiology helping.   TTE pending.    Problem/Plan - 2:  ·  Problem: Hypocalcemia secondary to Hypoparathyroidism .  Plan: Renal and Endo helping . Replacing IV .   Continue to monitor calcium levels.      Problem/Plan - 3:  ·  Problem: DM (diabetes mellitus).  Plan: Sugars in good range. Endo helping .   Stop PO medications and start sliding scale.   Check hemoglobin a1c.   Low carb diet.      Problem/Plan - 4:  ·  Problem: Nonischemic Cardiomyopathy .  Plan: AICD interrogation noted.      Problem/Plan - 5:  ·  Problem: HTN (hypertension).  Plan: BP readings okay .   Continue with current medications.   Low salt,low cholesterol, DASH diet.     Problem/Plan - 6:  ·  Problem: Hyponatremia with Hypokalemia  .  Plan: Correcting . Renal helping.     Problem/Plan - 7:  ·  Problem: Abdominal Pain with abnormal LFT .  Plan: trending down. US abdomen noted .  Likely secondary to Hepatic congestion from CHF.     Problem/Plan - 8:  ·  Problem: Gout flare up .  Plan: Ankle pain resolved . Will need Allopurinol once pain resolved. PARVIN noted.No PVD.     Problem/Plan - 9:  ·  Problem: Need for prophylactic measure.  Plan: Heparin SQ for vte prophylaxis.     Disposition : DC planning pending TTE .

## 2018-04-26 NOTE — PROGRESS NOTE ADULT - SUBJECTIVE AND OBJECTIVE BOX
Subjective:  No CP, SOB improved  	  MEDICATIONS  (STANDING):  aspirin enteric coated 81 milliGRAM(s) Oral daily  BACItracin   Ointment 1 Application(s) Topical two times a day  calcitriol   Capsule 0.25 MICROGram(s) Oral at bedtime  calcitriol   Capsule 0.5 MICROGram(s) Oral daily  calcium acetate 1334 milliGRAM(s) Oral three times a day with meals  calcium carbonate 1250 mG (OsCal) 2 Tablet(s) Oral two times a day  carvedilol 3.125 milliGRAM(s) Oral every 12 hours  cholecalciferol 1000 Unit(s) Oral daily  colchicine 0.6 milliGRAM(s) Oral two times a day  famotidine    Tablet 20 milliGRAM(s) Oral two times a day  furosemide   Injectable 80 milliGRAM(s) IV Push two times a day  heparin  Injectable 5000 Unit(s) SubCutaneous every 12 hours  insulin glargine Injectable (LANTUS) 6 Unit(s) SubCutaneous at bedtime  insulin lispro (HumaLOG) corrective regimen sliding scale   SubCutaneous three times a day before meals  insulin lispro (HumaLOG) corrective regimen sliding scale   SubCutaneous at bedtime  levothyroxine 150 MICROGram(s) Oral daily  lisinopril 10 milliGRAM(s) Oral daily  magnesium oxide 400 milliGRAM(s) Oral three times a day with meals    MEDICATIONS  (PRN):  acetaminophen   Tablet. 650 milliGRAM(s) Oral every 6 hours PRN Mild Pain (1 - 3)  acetaminophen   Tablet. 650 milliGRAM(s) Oral every 6 hours PRN Moderate Pain (4 - 6)  dextrose Gel 1 Dose(s) Oral once PRN Blood Glucose LESS THAN 70 milliGRAM(s)/deciliter  glucagon  Injectable 1 milliGRAM(s) IntraMuscular once PRN Glucose LESS THAN 70 milligrams/deciliter      LABS:                        12.3   6.42  )-----------( 222      ( 24 Apr 2018 06:19 )             37.4    132<L>  |  87<L>  |  27<H>  ----------------------------<  115<H>  4.1   |  27  |  1.28    Ca    7.3<L>      24 Apr 2018 06:19  Phos  5.2     04-24  Mg     1.8     04-24    TPro  6.8  /  Alb  3.2<L>  /  TBili  2.3<H>  /  DBili  1.3<H>  /  AST  89<H>  /  ALT  70<H>  /  AlkPhos  279<H>  04-24    Creatinine Trend: 1.28<--, 1.32<--, 1.26<--, 1.29<--, 1.25<--, 1.17<--     PHYSICAL EXAM  Vital Signs Last 24 Hrs  T(C): 36.9 (24 Apr 2018 05:52), Max: 36.9 (23 Apr 2018 22:29)  T(F): 98.4 (24 Apr 2018 05:52), Max: 98.5 (23 Apr 2018 22:29)  HR: 87 (24 Apr 2018 05:52) (71 - 87)  BP: 122/83 (24 Apr 2018 05:52) (100/71 - 122/83)  BP(mean): 97 (24 Apr 2018 05:52) (90 - 97)  RR: 18 (24 Apr 2018 05:52) (17 - 18)  SpO2: 100% (24 Apr 2018 05:52) (98% - 100%)      Cardiovascular: Normal S1 S2,  No JVD, 1/6 ROSALINDA murmur,  Respiratory: Lungs clear to auscultation, normal effort 	  Gastrointestinal:  Soft, Non-tender, + BS	  Extremities + edema, much improved,  no cyanosis, clubbing B/L LE's     DATA:    TELEMETRY: 	V paced     	  < from: TTE with Doppler (w/Cont) (05.16.17 @ 09:19) >  CONCLUSIONS:  1. Mitral annular calcification, otherwise normal mitral  valve. Moderate mitral regurgitation.  2. Moderately dilated left atrium.  LA volume index = 46  cc/m2.  3. Severe left ventricular enlargement.  4. Severe global left ventricular systolic dysfunction.  Endocardial visualization enhanced with intravenous  injection of echo contrast (Definity).  5. Normal right ventricular size with decreased right  ventricular systolic function.  6. Estimated right ventricular systolic pressure equals 53  mm Hg, assuming right atrial pressure equals 10 mm Hg,  consistent with moderate pulmonary hypertension.  *** Compared with echocardiogram of 5/22/2014, no  significant changes noted.  ------------------------------------------------------------------------  Confirmed on  5/16/2017 - 11:49:02 by Xander Tyson M.D.    < end of copied text >    < from: US Abdomen Limited (04.20.18 @ 09:34) >  IMPRESSION:     No ascites.  Gallbladder not identified and likely contracted.    < end of copied text >      ASSESSMENT/PLAN: 	62y Female with past medical history of hypertension, hyperlipidemia, chronic RBBB,  obesity, thyroid CA,  nonischemic cardiomyopathy with an ejection fraction of about 25% and normal coronaries on recent  cardiac catheterization at [facility] with Dr Chavarria , s/p [employer] ICD placement in June 2017  admitted with complaints of progressively worsening abdominal distention, bilateral lower extremity edema and shortness of breath.  She states about 2 weeks ago she was admitted at [place] for hypocalcemia at which time her diuretics were held. She was placed back on Torsemide upon discharge however despite medication and dietary compliance, she has developed worsening LE edema and abdominal distention and SOB. She denies any anginal chest pain, palpitations, syncope or near syncope or ICD fire.     -- The patient has ruled out for acute coronary syndrome with negative serial cardiac enzymes  -- would continue with Lasix 80 mg  IV BID  diuresis  - to keep output> input - if not diuresing well may need to give Torsemide   -- monitor electrolytes supplement as required  -- c/w BB/ACE for NICM  -- repeat TTE to assess LV function/r/o pericardial effusion  --PVRs were normal B/L  --  ICD interrogation w/ One episode of NSVT @ 214 b/min x 8 beats on 4/18/2018.  No atrial tachyarrhythmias. Optivol fluid level increased beyond the threshold since March 1, 2018.  Total BiV pacing 99%             --  abdominal ultrasound w/o ascites   -- would obtain records from Dr Chavarria  -- HD stable  -- final recs pending above  -- of note patient states she would like to follow with a cardiologist in this area as going to [facility] to see Dr Chavarria is not possible for her anymore    Reina Chino PA-C
Chief complaint  Patient is a 62y old  Female who presents with a chief complaint of shortness of breath (18 Apr 2018 20:09)   Review of systems  Patient in bed, looks comfortable, no fever,  no hypoglycemia.    Labs and Fingersticks  CAPILLARY BLOOD GLUCOSE      POCT Blood Glucose.: 98 mg/dL (26 Apr 2018 08:43)  POCT Blood Glucose.: 190 mg/dL (25 Apr 2018 21:45)  POCT Blood Glucose.: 178 mg/dL (25 Apr 2018 17:51)  POCT Blood Glucose.: 151 mg/dL (25 Apr 2018 12:37)          Calcium, Total Serum: 7.2 <L> (04-26 @ 05:45)  Calcium, Total Serum: 7.5 <L> (04-25 @ 06:15)  Albumin, Serum: 3.1 <L> (04-26 @ 05:45)  Albumin, Serum: 3.5 (04-25 @ 06:15)    Alanine Aminotransferase (ALT/SGPT): 75 <H> (04-26 @ 05:45)  Alanine Aminotransferase (ALT/SGPT): 71 <H> (04-25 @ 06:15)  Alkaline Phosphatase, Serum: 280 <H> (04-26 @ 05:45)  Alkaline Phosphatase, Serum: 308 <H> (04-25 @ 06:15)  Aspartate Aminotransferase (AST/SGOT): 84 <H> (04-26 @ 05:45)  Aspartate Aminotransferase (AST/SGOT): 82 <H> (04-25 @ 06:15)        04-26    134<L>  |  89<L>  |  27<H>  ----------------------------<  117<H>  3.4<L>   |  30  |  1.22    Ca    7.2<L>      26 Apr 2018 05:45  Phos  4.9     04-26  Mg     1.8     04-26    TPro  6.5  /  Alb  3.1<L>  /  TBili  2.3<H>  /  DBili  1.5<H>  /  AST  84<H>  /  ALT  75<H>  /  AlkPhos  280<H>  04-26                        11.8   5.02  )-----------( 209      ( 26 Apr 2018 05:45 )             36.2     Medications  MEDICATIONS  (STANDING):  aspirin enteric coated 81 milliGRAM(s) Oral daily  calcitriol   Capsule 0.5 MICROGram(s) Oral two times a day  calcium acetate 1334 milliGRAM(s) Oral three times a day with meals  calcium carbonate 1250 mG (OsCal) 2 Tablet(s) Oral three times a day  carvedilol 3.125 milliGRAM(s) Oral every 12 hours  cholecalciferol 1000 Unit(s) Oral daily  colchicine 0.6 milliGRAM(s) Oral two times a day  dextrose 5%. 1000 milliLiter(s) (50 mL/Hr) IV Continuous <Continuous>  dextrose 50% Injectable 12.5 Gram(s) IV Push once  dextrose 50% Injectable 25 Gram(s) IV Push once  dextrose 50% Injectable 25 Gram(s) IV Push once  famotidine    Tablet 20 milliGRAM(s) Oral two times a day  furosemide   Injectable 80 milliGRAM(s) IV Push two times a day  heparin  Injectable 5000 Unit(s) SubCutaneous every 12 hours  insulin glargine Injectable (LANTUS) 6 Unit(s) SubCutaneous at bedtime  insulin lispro (HumaLOG) corrective regimen sliding scale   SubCutaneous three times a day before meals  insulin lispro (HumaLOG) corrective regimen sliding scale   SubCutaneous at bedtime  levothyroxine 150 MICROGram(s) Oral daily  lisinopril 10 milliGRAM(s) Oral daily  magnesium oxide 400 milliGRAM(s) Oral three times a day with meals      Physical Exam  General: Patient comfortable in bed  Vital Signs Last 12 Hrs  T(F): 97.6 (04-26-18 @ 06:22), Max: 97.6 (04-26-18 @ 06:22)  HR: 80 (04-26-18 @ 06:22) (80 - 80)  BP: 115/69 (04-26-18 @ 06:22) (115/69 - 115/69)  BP(mean): --  RR: 16 (04-26-18 @ 06:22) (16 - 16)  SpO2: 100% (04-26-18 @ 06:22) (100% - 100%)  Neck: No palpable thyroid nodules.  CVS: S1S2, No murmurs  Respiratory: No wheezing, no crepitations  GI: Abdomen soft, bowel sounds positive  Musculoskeletal:  edema lower extremities.   Skin: No skin rashes, no ecchymosis    Diagnostics    Free Thyroxine, Serum: AM Sched. Collection: 21-Apr-2018 04:00 (04-20 @ 16:07)  Thyroid Stimulating Hormone, Serum: AM Sched. Collection: 21-Apr-2018 04:00 (04-20 @ 16:07)
Chief complaint  Patient is a 62y old  Female who presents with a chief complaint of shortness of breath (18 Apr 2018 20:09)   Review of systems  Patient in bed, looks comfortable, no fever, no hypoglycemia.    Labs and Fingersticks  CAPILLARY BLOOD GLUCOSE      POCT Blood Glucose.: 112 mg/dL (25 Apr 2018 08:36)  POCT Blood Glucose.: 233 mg/dL (24 Apr 2018 21:46)  POCT Blood Glucose.: 137 mg/dL (24 Apr 2018 18:09)  POCT Blood Glucose.: 176 mg/dL (24 Apr 2018 12:30)          Calcium, Total Serum: 7.5 <L> (04-25 @ 06:15)  Calcium, Total Serum: 7.3 <L> (04-24 @ 06:19)  Albumin, Serum: 3.5 (04-25 @ 06:15)  Albumin, Serum: 3.2 <L> (04-24 @ 06:19)    Alanine Aminotransferase (ALT/SGPT): 71 <H> (04-25 @ 06:15)  Alanine Aminotransferase (ALT/SGPT): 70 <H> (04-24 @ 06:19)  Alkaline Phosphatase, Serum: 308 <H> (04-25 @ 06:15)  Alkaline Phosphatase, Serum: 279 <H> (04-24 @ 06:19)  Aspartate Aminotransferase (AST/SGOT): 82 <H> (04-25 @ 06:15)  Aspartate Aminotransferase (AST/SGOT): 89 <H> (04-24 @ 06:19)        04-25    135  |  89<L>  |  29<H>  ----------------------------<  110<H>  3.8   |  29  |  1.31<H>    Ca    7.5<L>      25 Apr 2018 06:15  Phos  5.2     04-24  Mg     1.8     04-25    TPro  7.1  /  Alb  3.5  /  TBili  2.4<H>  /  DBili  1.6<H>  /  AST  82<H>  /  ALT  71<H>  /  AlkPhos  308<H>  04-25                        12.4   5.91  )-----------( 221      ( 25 Apr 2018 06:15 )             37.9     Medications  MEDICATIONS  (STANDING):  aspirin enteric coated 81 milliGRAM(s) Oral daily  BACItracin   Ointment 1 Application(s) Topical two times a day  calcitriol   Capsule 0.5 MICROGram(s) Oral two times a day  calcium acetate 1334 milliGRAM(s) Oral three times a day with meals  calcium carbonate 1250 mG (OsCal) 2 Tablet(s) Oral two times a day  carvedilol 3.125 milliGRAM(s) Oral every 12 hours  cholecalciferol 1000 Unit(s) Oral daily  colchicine 0.6 milliGRAM(s) Oral two times a day  dextrose 5%. 1000 milliLiter(s) (50 mL/Hr) IV Continuous <Continuous>  dextrose 50% Injectable 12.5 Gram(s) IV Push once  dextrose 50% Injectable 25 Gram(s) IV Push once  dextrose 50% Injectable 25 Gram(s) IV Push once  famotidine    Tablet 20 milliGRAM(s) Oral two times a day  furosemide   Injectable 80 milliGRAM(s) IV Push two times a day  heparin  Injectable 5000 Unit(s) SubCutaneous every 12 hours  insulin glargine Injectable (LANTUS) 6 Unit(s) SubCutaneous at bedtime  insulin lispro (HumaLOG) corrective regimen sliding scale   SubCutaneous three times a day before meals  insulin lispro (HumaLOG) corrective regimen sliding scale   SubCutaneous at bedtime  levothyroxine 150 MICROGram(s) Oral daily  lisinopril 10 milliGRAM(s) Oral daily  magnesium oxide 400 milliGRAM(s) Oral three times a day with meals      Physical Exam  General: Patient comfortable in bed  Vital Signs Last 12 Hrs  T(F): 98.8 (04-25-18 @ 05:49), Max: 98.8 (04-25-18 @ 05:49)  HR: 78 (04-25-18 @ 05:49) (78 - 78)  BP: 120/70 (04-25-18 @ 05:49) (120/70 - 120/70)  BP(mean): --  RR: 16 (04-25-18 @ 05:49) (16 - 16)  SpO2: 99% (04-25-18 @ 05:49) (99% - 99%)  Neck: No palpable thyroid nodules.  CVS: S1S2, No murmurs  Respiratory: No wheezing, no crepitations  GI: Abdomen soft, bowel sounds positive  Musculoskeletal:  edema lower extremities.   Skin: No skin rashes, no ecchymosis    Diagnostics    Free Thyroxine, Serum: AM Sched. Collection: 21-Apr-2018 04:00 (04-20 @ 16:07)  Thyroid Stimulating Hormone, Serum: AM Sched. Collection: 21-Apr-2018 04:00 (04-20 @ 16:07)
Chief complaint  Patient is a 62y old  Female who presents with a chief complaint of shortness of breath (18 Apr 2018 20:09)   Review of systems  Patient in bed, looks comfortable, no fever, no hypoglycemia.    Labs and Fingersticks  CAPILLARY BLOOD GLUCOSE      POCT Blood Glucose.: 137 mg/dL (22 Apr 2018 17:51)  POCT Blood Glucose.: 192 mg/dL (22 Apr 2018 12:29)  POCT Blood Glucose.: 125 mg/dL (22 Apr 2018 08:33)  POCT Blood Glucose.: 189 mg/dL (21 Apr 2018 22:08)          Calcium, Total Serum: 7.2 <L> (04-22 @ 07:15)  Calcium, Total Serum: 6.7 <L> (04-21 @ 06:50)          04-22    132<L>  |  88<L>  |  27<H>  ----------------------------<  148<H>  3.8   |  29  |  1.26    Ca    7.2<L>      22 Apr 2018 07:15  Phos  5.0     04-21  Mg     1.8     04-21                          12.1   5.86  )-----------( 213      ( 22 Apr 2018 07:15 )             36.5     Medications  MEDICATIONS  (STANDING):  aspirin enteric coated 81 milliGRAM(s) Oral daily  calcitriol   Capsule 0.25 MICROGram(s) Oral at bedtime  calcitriol   Capsule 0.5 MICROGram(s) Oral daily  calcium acetate 667 milliGRAM(s) Oral three times a day with meals  calcium carbonate 1250 mG (OsCal) 2 Tablet(s) Oral two times a day  carvedilol 3.125 milliGRAM(s) Oral every 12 hours  cholecalciferol 1000 Unit(s) Oral daily  colchicine 0.6 milliGRAM(s) Oral two times a day  dextrose 5%. 1000 milliLiter(s) (50 mL/Hr) IV Continuous <Continuous>  dextrose 50% Injectable 12.5 Gram(s) IV Push once  dextrose 50% Injectable 25 Gram(s) IV Push once  dextrose 50% Injectable 25 Gram(s) IV Push once  famotidine    Tablet 20 milliGRAM(s) Oral two times a day  furosemide   Injectable 80 milliGRAM(s) IV Push two times a day  heparin  Injectable 5000 Unit(s) SubCutaneous every 12 hours  insulin glargine Injectable (LANTUS) 6 Unit(s) SubCutaneous at bedtime  insulin lispro (HumaLOG) corrective regimen sliding scale   SubCutaneous three times a day before meals  insulin lispro (HumaLOG) corrective regimen sliding scale   SubCutaneous at bedtime  levothyroxine 150 MICROGram(s) Oral daily  lisinopril 10 milliGRAM(s) Oral daily  magnesium oxide 400 milliGRAM(s) Oral three times a day with meals      Physical Exam  General: Patient comfortable in bed  Vital Signs Last 12 Hrs  T(F): 97.1 (04-22-18 @ 11:57), Max: 97.1 (04-22-18 @ 11:57)  HR: 75 (04-22-18 @ 17:04) (72 - 78)  BP: 101/62 (04-22-18 @ 17:04) (101/62 - 113/84)  BP(mean): --  RR: 14 (04-22-18 @ 17:04) (14 - 16)  SpO2: 100% (04-22-18 @ 17:04) (100% - 100%)  Neck: No palpable thyroid nodules.  CVS: S1S2, No murmurs  Respiratory: No wheezing, no crepitations  GI: Abdomen soft, bowel sounds positive  Musculoskeletal:  edema lower extremities.   Skin: No skin rashes, no ecchymosis    Diagnostics    Free Thyroxine, Serum: AM Sched. Collection: 21-Apr-2018 04:00 (04-20 @ 16:07)  Thyroid Stimulating Hormone, Serum: AM Sched. Collection: 21-Apr-2018 04:00 (04-20 @ 16:07)
Chief complaint  Patient is a 62y old  Female who presents with a chief complaint of shortness of breath (18 Apr 2018 20:09)   Review of systems  Patient in bed, looks comfortable, no fever, no hypoglycemia.    Labs and Fingersticks  CAPILLARY BLOOD GLUCOSE      POCT Blood Glucose.: 142 mg/dL (23 Apr 2018 12:31)  POCT Blood Glucose.: 107 mg/dL (23 Apr 2018 09:09)  POCT Blood Glucose.: 206 mg/dL (22 Apr 2018 21:30)  POCT Blood Glucose.: 137 mg/dL (22 Apr 2018 17:51)          Calcium, Total Serum: 7.2 <L> (04-23 @ 06:56)  Calcium, Total Serum: 7.2 <L> (04-22 @ 07:15)          04-23    131<L>  |  87<L>  |  26<H>  ----------------------------<  103<H>  3.4<L>   |  27  |  1.32<H>    Ca    7.2<L>      23 Apr 2018 06:56  Phos  5.5     04-23  Mg     1.7     04-23                          12.2   6.25  )-----------( 230      ( 23 Apr 2018 06:56 )             37.3     Medications  MEDICATIONS  (STANDING):  aspirin enteric coated 81 milliGRAM(s) Oral daily  BACItracin   Ointment 1 Application(s) Topical two times a day  calcitriol   Capsule 0.25 MICROGram(s) Oral at bedtime  calcitriol   Capsule 0.5 MICROGram(s) Oral daily  calcium acetate 1334 milliGRAM(s) Oral three times a day with meals  calcium carbonate 1250 mG (OsCal) 2 Tablet(s) Oral two times a day  carvedilol 3.125 milliGRAM(s) Oral every 12 hours  cholecalciferol 1000 Unit(s) Oral daily  colchicine 0.6 milliGRAM(s) Oral two times a day  dextrose 5%. 1000 milliLiter(s) (50 mL/Hr) IV Continuous <Continuous>  dextrose 50% Injectable 12.5 Gram(s) IV Push once  dextrose 50% Injectable 25 Gram(s) IV Push once  dextrose 50% Injectable 25 Gram(s) IV Push once  famotidine    Tablet 20 milliGRAM(s) Oral two times a day  furosemide   Injectable 80 milliGRAM(s) IV Push two times a day  heparin  Injectable 5000 Unit(s) SubCutaneous every 12 hours  insulin glargine Injectable (LANTUS) 6 Unit(s) SubCutaneous at bedtime  insulin lispro (HumaLOG) corrective regimen sliding scale   SubCutaneous three times a day before meals  insulin lispro (HumaLOG) corrective regimen sliding scale   SubCutaneous at bedtime  levothyroxine 150 MICROGram(s) Oral daily  lisinopril 10 milliGRAM(s) Oral daily  magnesium oxide 400 milliGRAM(s) Oral three times a day with meals      Physical Exam  General: Patient comfortable in bed  Vital Signs Last 12 Hrs  T(F): 98 (04-23-18 @ 05:37), Max: 98 (04-23-18 @ 05:37)  HR: 77 (04-23-18 @ 05:37) (77 - 77)  BP: 102/73 (04-23-18 @ 05:37) (102/73 - 102/73)  BP(mean): --  RR: 16 (04-23-18 @ 05:37) (16 - 16)  SpO2: 100% (04-23-18 @ 05:37) (100% - 100%)  Neck: No palpable thyroid nodules.  CVS: S1S2, No murmurs  Respiratory: No wheezing, no crepitations  GI: Abdomen soft, bowel sounds positive  Musculoskeletal:  edema lower extremities.   Skin: No skin rashes, no ecchymosis    Diagnostics    Free Thyroxine, Serum: AM Sched. Collection: 21-Apr-2018 04:00 (04-20 @ 16:07)  Thyroid Stimulating Hormone, Serum: AM Sched. Collection: 21-Apr-2018 04:00 (04-20 @ 16:07)
Chief complaint  Patient is a 62y old  Female who presents with a chief complaint of shortness of breath (18 Apr 2018 20:09)   Review of systems  Patient in bed, looks comfortable, no fever, no hypoglycemia.    Labs and Fingersticks  CAPILLARY BLOOD GLUCOSE      POCT Blood Glucose.: 176 mg/dL (24 Apr 2018 12:30)  POCT Blood Glucose.: 113 mg/dL (24 Apr 2018 08:44)  POCT Blood Glucose.: 165 mg/dL (23 Apr 2018 21:24)  POCT Blood Glucose.: 129 mg/dL (23 Apr 2018 17:43)          Calcium, Total Serum: 7.3 <L> (04-24 @ 06:19)  Calcium, Total Serum: 7.2 <L> (04-23 @ 06:56)  Albumin, Serum: 3.2 <L> (04-24 @ 06:19)    Alanine Aminotransferase (ALT/SGPT): 70 <H> (04-24 @ 06:19)  Alkaline Phosphatase, Serum: 279 <H> (04-24 @ 06:19)  Aspartate Aminotransferase (AST/SGOT): 89 <H> (04-24 @ 06:19)        04-24    132<L>  |  87<L>  |  27<H>  ----------------------------<  115<H>  4.1   |  27  |  1.28    Ca    7.3<L>      24 Apr 2018 06:19  Phos  5.2     04-24  Mg     1.8     04-24    TPro  6.8  /  Alb  3.2<L>  /  TBili  2.3<H>  /  DBili  1.3<H>  /  AST  89<H>  /  ALT  70<H>  /  AlkPhos  279<H>  04-24                        12.3   6.42  )-----------( 222      ( 24 Apr 2018 06:19 )             37.4     Medications  MEDICATIONS  (STANDING):  aspirin enteric coated 81 milliGRAM(s) Oral daily  BACItracin   Ointment 1 Application(s) Topical two times a day  calcitriol   Capsule 0.5 MICROGram(s) Oral two times a day  calcium acetate 1334 milliGRAM(s) Oral three times a day with meals  calcium carbonate 1250 mG (OsCal) 2 Tablet(s) Oral two times a day  carvedilol 3.125 milliGRAM(s) Oral every 12 hours  cholecalciferol 1000 Unit(s) Oral daily  colchicine 0.6 milliGRAM(s) Oral two times a day  dextrose 5%. 1000 milliLiter(s) (50 mL/Hr) IV Continuous <Continuous>  dextrose 50% Injectable 12.5 Gram(s) IV Push once  dextrose 50% Injectable 25 Gram(s) IV Push once  dextrose 50% Injectable 25 Gram(s) IV Push once  famotidine    Tablet 20 milliGRAM(s) Oral two times a day  furosemide   Injectable 80 milliGRAM(s) IV Push two times a day  heparin  Injectable 5000 Unit(s) SubCutaneous every 12 hours  insulin glargine Injectable (LANTUS) 6 Unit(s) SubCutaneous at bedtime  insulin lispro (HumaLOG) corrective regimen sliding scale   SubCutaneous three times a day before meals  insulin lispro (HumaLOG) corrective regimen sliding scale   SubCutaneous at bedtime  levothyroxine 150 MICROGram(s) Oral daily  lisinopril 10 milliGRAM(s) Oral daily  magnesium oxide 400 milliGRAM(s) Oral three times a day with meals      Physical Exam  General: Patient comfortable in bed  Vital Signs Last 12 Hrs  T(F): 97.3 (04-24-18 @ 12:58), Max: 98.4 (04-24-18 @ 05:52)  HR: 74 (04-24-18 @ 12:58) (74 - 87)  BP: 107/71 (04-24-18 @ 12:58) (107/71 - 122/83)  BP(mean): 97 (04-24-18 @ 05:52) (97 - 97)  RR: 18 (04-24-18 @ 12:58) (18 - 18)  SpO2: 100% (04-24-18 @ 12:58) (100% - 100%)  Neck: No palpable thyroid nodules.  CVS: S1S2, No murmurs  Respiratory: No wheezing, no crepitations  GI: Abdomen soft, bowel sounds positive  Musculoskeletal:  edema lower extremities.   Skin: No skin rashes, no ecchymosis    Diagnostics    Free Thyroxine, Serum: AM Sched. Collection: 21-Apr-2018 04:00 (04-20 @ 16:07)  Thyroid Stimulating Hormone, Serum: AM Sched. Collection: 21-Apr-2018 04:00 (04-20 @ 16:07)
Chief complaint  Patient is a 62y old  Female who presents with a chief complaint of shortness of breath (18 Apr 2018 20:09)   Review of systems  Patient in bed, looks comfortable, no fever, no hypoglycemia.    Labs and Fingersticks  CAPILLARY BLOOD GLUCOSE      POCT Blood Glucose.: 206 mg/dL (21 Apr 2018 12:06)  POCT Blood Glucose.: 121 mg/dL (21 Apr 2018 08:45)  POCT Blood Glucose.: 165 mg/dL (20 Apr 2018 21:48)  POCT Blood Glucose.: 121 mg/dL (20 Apr 2018 17:44)          Calcium, Total Serum: 6.7 <L> (04-21 @ 06:50)  Calcium, Total Serum: 6.8 <L> (04-20 @ 07:30)  Calcium, Total Serum: 6.7 <L> (04-19 @ 18:00)  Albumin, Serum: 3.0 <L> (04-20 @ 07:30)    Alanine Aminotransferase (ALT/SGPT): 43 <H> (04-20 @ 07:30)  Alkaline Phosphatase, Serum: 262 <H> (04-20 @ 07:30)  Aspartate Aminotransferase (AST/SGOT): 59 <H> (04-20 @ 07:30)        04-21    130<L>  |  87<L>  |  27<H>  ----------------------------<  143<H>  3.5   |  27  |  1.29    Ca    6.7<L>      21 Apr 2018 06:50  Phos  5.0     04-21  Mg     1.8     04-21    TPro  6.7  /  Alb  3.0<L>  /  TBili  3.0<H>  /  DBili  1.7<H>  /  AST  59<H>  /  ALT  43<H>  /  AlkPhos  262<H>  04-20                        12.4   6.91  )-----------( 226      ( 21 Apr 2018 06:50 )             38.2     Medications  MEDICATIONS  (STANDING):  aspirin enteric coated 81 milliGRAM(s) Oral daily  calcitriol   Capsule 0.25 MICROGram(s) Oral at bedtime  calcitriol   Capsule 0.5 MICROGram(s) Oral daily  calcium acetate 667 milliGRAM(s) Oral three times a day with meals  calcium carbonate 1250 mG (OsCal) 2 Tablet(s) Oral two times a day  carvedilol 3.125 milliGRAM(s) Oral every 12 hours  cholecalciferol 1000 Unit(s) Oral daily  colchicine 0.6 milliGRAM(s) Oral two times a day  dextrose 5%. 1000 milliLiter(s) (50 mL/Hr) IV Continuous <Continuous>  dextrose 50% Injectable 12.5 Gram(s) IV Push once  dextrose 50% Injectable 25 Gram(s) IV Push once  dextrose 50% Injectable 25 Gram(s) IV Push once  famotidine    Tablet 20 milliGRAM(s) Oral two times a day  furosemide   Injectable 80 milliGRAM(s) IV Push two times a day  heparin  Injectable 5000 Unit(s) SubCutaneous every 12 hours  insulin glargine Injectable (LANTUS) 6 Unit(s) SubCutaneous at bedtime  insulin lispro (HumaLOG) corrective regimen sliding scale   SubCutaneous three times a day before meals  insulin lispro (HumaLOG) corrective regimen sliding scale   SubCutaneous at bedtime  levothyroxine 150 MICROGram(s) Oral daily  lisinopril 10 milliGRAM(s) Oral daily  magnesium oxide 400 milliGRAM(s) Oral three times a day with meals  potassium chloride    Tablet ER 40 milliEquivalent(s) Oral every 4 hours      Physical Exam  General: Patient comfortable in bed  Vital Signs Last 12 Hrs  T(F): 97.9 (04-21-18 @ 12:47), Max: 97.9 (04-21-18 @ 12:47)  HR: 72 (04-21-18 @ 12:47) (72 - 94)  BP: 103/76 (04-21-18 @ 12:47) (103/76 - 127/77)  BP(mean): --  RR: 17 (04-21-18 @ 12:47) (17 - 17)  SpO2: 97% (04-21-18 @ 12:47) (97% - 98%)  Neck: No palpable thyroid nodules.  CVS: S1S2, No murmurs  Respiratory: No wheezing, no crepitations  GI: Abdomen soft, bowel sounds positive  Musculoskeletal:  edema lower extremities.   Skin: No skin rashes, no ecchymosis    Diagnostics    Free Thyroxine, Serum: AM Sched. Collection: 21-Apr-2018 04:00 (04-20 @ 16:07)  Thyroid Stimulating Hormone, Serum: AM Sched. Collection: 21-Apr-2018 04:00 (04-20 @ 16:07)
INTERVAL HPI/OVERNIGHT EVENTS: Feeling slightly better .   Vital Signs Last 24 Hrs  T(C): 36.7 (19 Apr 2018 10:55), Max: 36.8 (18 Apr 2018 22:03)  T(F): 98 (19 Apr 2018 10:55), Max: 98.3 (18 Apr 2018 22:03)  HR: 75 (19 Apr 2018 10:55) (75 - 91)  BP: 112/74 (19 Apr 2018 10:55) (102/75 - 127/95)  BP(mean): --  RR: 18 (19 Apr 2018 10:55) (17 - 18)  SpO2: 99% (19 Apr 2018 10:55) (99% - 100%)  I&O's Summary    18 Apr 2018 07:01  -  19 Apr 2018 07:00  --------------------------------------------------------  IN: 0 mL / OUT: 300 mL / NET: -300 mL      MEDICATIONS  (STANDING):  aspirin enteric coated 81 milliGRAM(s) Oral daily  calcitriol   Capsule 0.25 MICROGram(s) Oral at bedtime  calcitriol   Capsule 0.5 MICROGram(s) Oral daily  calcium acetate 667 milliGRAM(s) Oral three times a day with meals  calcium carbonate 1250 mG (OsCal) 1 Tablet(s) Oral two times a day  carvedilol 3.125 milliGRAM(s) Oral every 12 hours  cholecalciferol 1000 Unit(s) Oral daily  dextrose 5%. 1000 milliLiter(s) (50 mL/Hr) IV Continuous <Continuous>  dextrose 50% Injectable 12.5 Gram(s) IV Push once  dextrose 50% Injectable 25 Gram(s) IV Push once  dextrose 50% Injectable 25 Gram(s) IV Push once  famotidine    Tablet 20 milliGRAM(s) Oral two times a day  furosemide   Injectable 80 milliGRAM(s) IV Push two times a day  heparin  Injectable 5000 Unit(s) SubCutaneous every 12 hours  insulin lispro (HumaLOG) corrective regimen sliding scale   SubCutaneous three times a day before meals  insulin lispro (HumaLOG) corrective regimen sliding scale   SubCutaneous at bedtime  levothyroxine 150 MICROGram(s) Oral daily  lisinopril 10 milliGRAM(s) Oral daily    MEDICATIONS  (PRN):  dextrose Gel 1 Dose(s) Oral once PRN Blood Glucose LESS THAN 70 milliGRAM(s)/deciliter  glucagon  Injectable 1 milliGRAM(s) IntraMuscular once PRN Glucose LESS THAN 70 milligrams/deciliter    LABS:                        13.0   6.02  )-----------( 213      ( 19 Apr 2018 05:45 )             38.7     04-19    126<L>  |  85<L>  |  23  ----------------------------<  140<H>  5.1   |  26  |  1.12    Ca    6.3<LL>      19 Apr 2018 11:45  Phos  5.0     04-19  Mg     1.5     04-19    TPro  6.9  /  Alb  3.2<L>  /  TBili  3.2<H>  /  DBili  x   /  AST  57<H>  /  ALT  41<H>  /  AlkPhos  276<H>  04-19        CAPILLARY BLOOD GLUCOSE      POCT Blood Glucose.: 160 mg/dL (19 Apr 2018 13:16)  POCT Blood Glucose.: 125 mg/dL (19 Apr 2018 09:12)  POCT Blood Glucose.: 185 mg/dL (19 Apr 2018 01:10)          REVIEW OF SYSTEMS:  CONSTITUTIONAL: No fever, weight loss, or fatigue  EYES: No eye pain, visual disturbances, or discharge  ENMT:  No difficulty hearing, tinnitus, vertigo; No sinus or throat pain  NECK: No pain or stiffness  BREASTS: No pain, masses, or nipple discharge  RESPIRATORY: No cough, wheezing, chills or hemoptysis;  shortness of breath  CARDIOVASCULAR: No chest pain, palpitations, dizziness, but  leg swelling  GASTROINTESTINAL: No abdominal or epigastric pain. No nausea, vomiting, or hematemesis; No diarrhea or constipation. No melena or hematochezia.  GENITOURINARY: No dysuria, frequency, hematuria, or incontinence  NEUROLOGICAL: No headaches, memory loss, loss of strength, numbness, or tremors  SKIN: No itching, burning, rashes, or lesions   LYMPH NODES: No enlarged glands  ENDOCRINE: No heat or cold intolerance; No hair loss      Consultant(s) Notes Reviewed:  [x ] YES  [ ] NO    PHYSICAL EXAM:  GENERAL: NAD, well-groomed, well-developed, not in any distress ,  HEAD:  Atraumatic, Normocephalic  EYES: EOMI, PERRLA, conjunctiva and sclera clear  ENMT: No tonsillar erythema, exudates, or enlargement; Moist mucous membranes, Good dentition, No lesions  NECK: Supple, No JVD, Normal thyroid  NERVOUS SYSTEM:  Alert & Oriented X3, No focal deficit   CHEST/LUNG: Good air entry bilateral with basal  rales,   HEART: Regular rate and rhythm; No murmurs, rubs, or gallops  ABDOMEN: Soft, Nontender, distended; Bowel sounds present  EXTREMITIES:  2+ Peripheral Pulses, No clubbing, cyanosis, but 3+ edema  SKIN: No rashes or lesions    Care Discussed with Consultants/Other Providers [ x] YES  [ ] NO
INTERVAL HPI/OVERNIGHT EVENTS: Feet still hurt but better. Swelling better .   Vital Signs Last 24 Hrs  T(C): 36.9 (24 Apr 2018 05:52), Max: 36.9 (23 Apr 2018 22:29)  T(F): 98.4 (24 Apr 2018 05:52), Max: 98.5 (23 Apr 2018 22:29)  HR: 87 (24 Apr 2018 05:52) (71 - 87)  BP: 122/83 (24 Apr 2018 05:52) (100/71 - 122/83)  BP(mean): 97 (24 Apr 2018 05:52) (90 - 97)  RR: 18 (24 Apr 2018 05:52) (17 - 18)  SpO2: 100% (24 Apr 2018 05:52) (98% - 100%)  I&O's Summary    23 Apr 2018 07:01  -  24 Apr 2018 07:00  --------------------------------------------------------  IN: 900 mL / OUT: 2000 mL / NET: -1100 mL    24 Apr 2018 07:01  -  24 Apr 2018 12:38  --------------------------------------------------------  IN: 240 mL / OUT: 0 mL / NET: 240 mL      MEDICATIONS  (STANDING):  aspirin enteric coated 81 milliGRAM(s) Oral daily  BACItracin   Ointment 1 Application(s) Topical two times a day  calcitriol   Capsule 0.25 MICROGram(s) Oral at bedtime  calcitriol   Capsule 0.5 MICROGram(s) Oral daily  calcium acetate 1334 milliGRAM(s) Oral three times a day with meals  calcium carbonate 1250 mG (OsCal) 2 Tablet(s) Oral two times a day  carvedilol 3.125 milliGRAM(s) Oral every 12 hours  cholecalciferol 1000 Unit(s) Oral daily  colchicine 0.6 milliGRAM(s) Oral two times a day  dextrose 5%. 1000 milliLiter(s) (50 mL/Hr) IV Continuous <Continuous>  dextrose 50% Injectable 12.5 Gram(s) IV Push once  dextrose 50% Injectable 25 Gram(s) IV Push once  dextrose 50% Injectable 25 Gram(s) IV Push once  famotidine    Tablet 20 milliGRAM(s) Oral two times a day  furosemide   Injectable 80 milliGRAM(s) IV Push two times a day  heparin  Injectable 5000 Unit(s) SubCutaneous every 12 hours  insulin glargine Injectable (LANTUS) 6 Unit(s) SubCutaneous at bedtime  insulin lispro (HumaLOG) corrective regimen sliding scale   SubCutaneous three times a day before meals  insulin lispro (HumaLOG) corrective regimen sliding scale   SubCutaneous at bedtime  levothyroxine 150 MICROGram(s) Oral daily  lisinopril 10 milliGRAM(s) Oral daily  magnesium oxide 400 milliGRAM(s) Oral three times a day with meals    MEDICATIONS  (PRN):  acetaminophen   Tablet. 650 milliGRAM(s) Oral every 6 hours PRN Mild Pain (1 - 3)  acetaminophen   Tablet. 650 milliGRAM(s) Oral every 6 hours PRN Moderate Pain (4 - 6)  dextrose Gel 1 Dose(s) Oral once PRN Blood Glucose LESS THAN 70 milliGRAM(s)/deciliter  glucagon  Injectable 1 milliGRAM(s) IntraMuscular once PRN Glucose LESS THAN 70 milligrams/deciliter    LABS:                        12.3   6.42  )-----------( 222      ( 24 Apr 2018 06:19 )             37.4     04-24    132<L>  |  87<L>  |  27<H>  ----------------------------<  115<H>  4.1   |  27  |  1.28    Ca    7.3<L>      24 Apr 2018 06:19  Phos  5.2     04-24  Mg     1.8     04-24    TPro  6.8  /  Alb  3.2<L>  /  TBili  2.3<H>  /  DBili  1.3<H>  /  AST  89<H>  /  ALT  70<H>  /  AlkPhos  279<H>  04-24        CAPILLARY BLOOD GLUCOSE      POCT Blood Glucose.: 113 mg/dL (24 Apr 2018 08:44)  POCT Blood Glucose.: 165 mg/dL (23 Apr 2018 21:24)  POCT Blood Glucose.: 129 mg/dL (23 Apr 2018 17:43)          REVIEW OF SYSTEMS:  CONSTITUTIONAL: No fever, weight loss, or fatigue  EYES: No eye pain, visual disturbances, or discharge  ENMT:  No difficulty hearing, tinnitus, vertigo; No sinus or throat pain  NECK: No pain or stiffness  BREASTS: No pain, masses, or nipple discharge  RESPIRATORY: No cough, wheezing, chills or hemoptysis; No shortness of breath  CARDIOVASCULAR: No chest pain, palpitations, dizziness, or leg swelling  GASTROINTESTINAL: No abdominal or epigastric pain. No nausea, vomiting, or hematemesis; No diarrhea or constipation. No melena or hematochezia.  GENITOURINARY: No dysuria, frequency, hematuria, or incontinence  NEUROLOGICAL: No headaches, memory loss, loss of strength, numbness, or tremors  SKIN: No itching, burning, rashes, or lesions   LYMPH NODES: No enlarged glands  ENDOCRINE: No heat or cold intolerance; No hair loss  MUSCULOSKELETAL: pain feet       Consultant(s) Notes Reviewed:  [x ] YES  [ ] NO    PHYSICAL EXAM:  GENERAL: NAD, well-groomed, well-developed,not in any distress ,  HEAD:  Atraumatic, Normocephalic  EYES: EOMI, PERRLA, conjunctiva and sclera clear  ENMT: No tonsillar erythema, exudates, or enlargement; Moist mucous membranes, Good dentition, No lesions  NECK: Supple, No JVD, Normal thyroid  NERVOUS SYSTEM:  Alert & Oriented X3, No focal deficit   CHEST/LUNG: Good air entry bilateral with no  rales, rhonchi, wheezing, or rubs  HEART: Regular rate and rhythm; No murmurs, rubs, or gallops  ABDOMEN: Soft, Nontender, Nondistended; Bowel sounds present  EXTREMITIES:  2+ Peripheral Pulses, No clubbing, cyanosis,  2+  edema  SKIN: No rashes or lesions    Care Discussed with Consultants/Other Providers [ x] YES  [ ] NO
INTERVAL HPI/OVERNIGHT EVENTS: I feel better and want to go home.   Vital Signs Last 24 Hrs  T(C): 36.5 (25 Apr 2018 12:55), Max: 37.1 (25 Apr 2018 05:49)  T(F): 97.7 (25 Apr 2018 12:55), Max: 98.8 (25 Apr 2018 05:49)  HR: 77 (25 Apr 2018 12:55) (70 - 78)  BP: 106/79 (25 Apr 2018 12:55) (103/70 - 127/78)  BP(mean): --  RR: 17 (25 Apr 2018 12:55) (16 - 17)  SpO2: 100% (25 Apr 2018 12:55) (98% - 100%)  I&O's Summary    24 Apr 2018 07:01  -  25 Apr 2018 07:00  --------------------------------------------------------  IN: 660 mL / OUT: 1900 mL / NET: -1240 mL    25 Apr 2018 07:01  -  25 Apr 2018 15:24  --------------------------------------------------------  IN: 560 mL / OUT: 700 mL / NET: -140 mL      MEDICATIONS  (STANDING):  aspirin enteric coated 81 milliGRAM(s) Oral daily  BACItracin   Ointment 1 Application(s) Topical two times a day  calcitriol   Capsule 0.5 MICROGram(s) Oral two times a day  calcium acetate 1334 milliGRAM(s) Oral three times a day with meals  calcium carbonate 1250 mG (OsCal) 2 Tablet(s) Oral two times a day  carvedilol 3.125 milliGRAM(s) Oral every 12 hours  cholecalciferol 1000 Unit(s) Oral daily  colchicine 0.6 milliGRAM(s) Oral two times a day  dextrose 5%. 1000 milliLiter(s) (50 mL/Hr) IV Continuous <Continuous>  dextrose 50% Injectable 12.5 Gram(s) IV Push once  dextrose 50% Injectable 25 Gram(s) IV Push once  dextrose 50% Injectable 25 Gram(s) IV Push once  famotidine    Tablet 20 milliGRAM(s) Oral two times a day  furosemide   Injectable 80 milliGRAM(s) IV Push two times a day  heparin  Injectable 5000 Unit(s) SubCutaneous every 12 hours  insulin glargine Injectable (LANTUS) 6 Unit(s) SubCutaneous at bedtime  insulin lispro (HumaLOG) corrective regimen sliding scale   SubCutaneous three times a day before meals  insulin lispro (HumaLOG) corrective regimen sliding scale   SubCutaneous at bedtime  levothyroxine 150 MICROGram(s) Oral daily  lisinopril 10 milliGRAM(s) Oral daily  magnesium oxide 400 milliGRAM(s) Oral three times a day with meals    MEDICATIONS  (PRN):  acetaminophen   Tablet. 650 milliGRAM(s) Oral every 6 hours PRN Mild Pain (1 - 3)  acetaminophen   Tablet. 650 milliGRAM(s) Oral every 6 hours PRN Moderate Pain (4 - 6)  dextrose Gel 1 Dose(s) Oral once PRN Blood Glucose LESS THAN 70 milliGRAM(s)/deciliter  glucagon  Injectable 1 milliGRAM(s) IntraMuscular once PRN Glucose LESS THAN 70 milligrams/deciliter    LABS:                        12.4   5.91  )-----------( 221      ( 25 Apr 2018 06:15 )             37.9     04-25    135  |  89<L>  |  29<H>  ----------------------------<  110<H>  3.8   |  29  |  1.31<H>    Ca    7.5<L>      25 Apr 2018 06:15  Phos  5.2     04-24  Mg     1.8     04-25    TPro  7.1  /  Alb  3.5  /  TBili  2.4<H>  /  DBili  1.6<H>  /  AST  82<H>  /  ALT  71<H>  /  AlkPhos  308<H>  04-25        CAPILLARY BLOOD GLUCOSE      POCT Blood Glucose.: 151 mg/dL (25 Apr 2018 12:37)  POCT Blood Glucose.: 112 mg/dL (25 Apr 2018 08:36)  POCT Blood Glucose.: 233 mg/dL (24 Apr 2018 21:46)  POCT Blood Glucose.: 137 mg/dL (24 Apr 2018 18:09)          REVIEW OF SYSTEMS:  CONSTITUTIONAL: No fever, weight loss, or fatigue  EYES: No eye pain, visual disturbances, or discharge  ENMT:  No difficulty hearing, tinnitus, vertigo; No sinus or throat pain  NECK: No pain or stiffness  BREASTS: No pain, masses, or nipple discharge  RESPIRATORY: No cough, wheezing, chills or hemoptysis; No shortness of breath  CARDIOVASCULAR: No chest pain, palpitations, dizziness, or leg swelling  GASTROINTESTINAL: No abdominal or epigastric pain. No nausea, vomiting, or hematemesis; No diarrhea or constipation. No melena or hematochezia.  GENITOURINARY: No dysuria, frequency, hematuria, or incontinence  NEUROLOGICAL: No headaches, memory loss, loss of strength, numbness, or tremors  SKIN: No itching, burning, rashes, or lesions   LYMPH NODES: No enlarged glands  ENDOCRINE: No heat or cold intolerance; No hair loss  MUSCULOSKELETAL: No joint pain or swelling; No muscle, back, or extremity pain  PSYCHIATRIC: No depression, anxiety, mood swings, or difficulty sleeping  HEME/LYMPH: No easy bruising, or bleeding gums  ALLERY AND IMMUNOLOGIC: No hives or eczema    RADIOLOGY & ADDITIONAL TESTS:    Consultant(s) Notes Reviewed:  [x ] YES  [ ] NO    PHYSICAL EXAM:  GENERAL: NAD, well-groomed, well-developed,not in any distress ,  HEAD:  Atraumatic, Normocephalic  EYES: EOMI, PERRLA, conjunctiva and sclera clear  ENMT: No tonsillar erythema, exudates, or enlargement; Moist mucous membranes, Good dentition, No lesions  NECK: Supple, No JVD, Normal thyroid  NERVOUS SYSTEM:  Alert & Oriented X3, No focal deficit   CHEST/LUNG: Good air entry bilateral with no  rales, rhonchi, wheezing, or rubs  HEART: Regular rate and rhythm; No murmurs, rubs, or gallops  ABDOMEN: Soft, Nontender, Nondistended; Bowel sounds present  EXTREMITIES:  2+ Peripheral Pulses, No clubbing, cyanosis,  less  edema  SKIN: No rashes or lesions    Care Discussed with Consultants/Other Providers [ x] YES  [ ] NO
INTERVAL HPI/OVERNIGHT EVENTS: My ankles hurting .   Vital Signs Last 24 Hrs  T(C): 36.6 (2018 12:47), Max: 36.7 (2018 20:26)  T(F): 97.9 (2018 12:47), Max: 98 (2018 20:26)  HR: 72 (2018 12:47) (72 - 94)  BP: 103/76 (2018 12:47) (103/76 - 127/77)  BP(mean): --  RR: 17 (2018 12:47) (16 - 17)  SpO2: 97% (2018 12:47) (97% - 98%)  I&O's Summary    2018 07:  -  2018 07:00  --------------------------------------------------------  IN: 680 mL / OUT: 2050 mL / NET: -1370 mL    2018 07:01  -  2018 13:14  --------------------------------------------------------  IN: 240 mL / OUT: 0 mL / NET: 240 mL      MEDICATIONS  (STANDING):  aspirin enteric coated 81 milliGRAM(s) Oral daily  calcitriol   Capsule 0.25 MICROGram(s) Oral at bedtime  calcitriol   Capsule 0.5 MICROGram(s) Oral daily  calcium acetate 667 milliGRAM(s) Oral three times a day with meals  calcium carbonate 1250 mG (OsCal) 1 Tablet(s) Oral two times a day  carvedilol 3.125 milliGRAM(s) Oral every 12 hours  cholecalciferol 1000 Unit(s) Oral daily  colchicine 0.6 milliGRAM(s) Oral two times a day  dextrose 5%. 1000 milliLiter(s) (50 mL/Hr) IV Continuous <Continuous>  dextrose 50% Injectable 12.5 Gram(s) IV Push once  dextrose 50% Injectable 25 Gram(s) IV Push once  dextrose 50% Injectable 25 Gram(s) IV Push once  famotidine    Tablet 20 milliGRAM(s) Oral two times a day  furosemide   Injectable 80 milliGRAM(s) IV Push two times a day  heparin  Injectable 5000 Unit(s) SubCutaneous every 12 hours  insulin glargine Injectable (LANTUS) 6 Unit(s) SubCutaneous at bedtime  insulin lispro (HumaLOG) corrective regimen sliding scale   SubCutaneous three times a day before meals  insulin lispro (HumaLOG) corrective regimen sliding scale   SubCutaneous at bedtime  levothyroxine 150 MICROGram(s) Oral daily  lisinopril 10 milliGRAM(s) Oral daily  magnesium oxide 400 milliGRAM(s) Oral three times a day with meals    MEDICATIONS  (PRN):  acetaminophen   Tablet. 650 milliGRAM(s) Oral every 6 hours PRN Mild Pain (1 - 3)  dextrose Gel 1 Dose(s) Oral once PRN Blood Glucose LESS THAN 70 milliGRAM(s)/deciliter  glucagon  Injectable 1 milliGRAM(s) IntraMuscular once PRN Glucose LESS THAN 70 milligrams/deciliter    LABS:                        12.4   6.91  )-----------( 226      ( 2018 06:50 )             38.2     04-21    130<L>  |  87<L>  |  27<H>  ----------------------------<  143<H>  3.5   |  27  |  1.29    Ca    6.7<L>      2018 06:50  Phos  5.0     04-21  Mg     1.8     -    TPro  6.7  /  Alb  3.0<L>  /  TBili  3.0<H>  /  DBili  1.7<H>  /  AST  59<H>  /  ALT  43<H>  /  AlkPhos  262<H>  04-20      Urinalysis Basic - ( 2018 22:00 )    Color: PLYEL / Appearance: CLEAR / S.006 / pH: 6.5  Gluc: NEGATIVE / Ketone: NEGATIVE  / Bili: NEGATIVE / Urobili: NORMAL mg/dL   Blood: NEGATIVE / Protein: NEGATIVE mg/dL / Nitrite: NEGATIVE   Leuk Esterase: NEGATIVE / RBC: 2-5 / WBC 2-5   Sq Epi: OCC / Non Sq Epi: x / Bacteria: FEW      CAPILLARY BLOOD GLUCOSE      POCT Blood Glucose.: 206 mg/dL (2018 12:06)  POCT Blood Glucose.: 121 mg/dL (2018 08:45)  POCT Blood Glucose.: 165 mg/dL (2018 21:48)  POCT Blood Glucose.: 121 mg/dL (2018 17:44)        Urinalysis Basic - ( 2018 22:00 )    Color: PLYEL / Appearance: CLEAR / S.006 / pH: 6.5  Gluc: NEGATIVE / Ketone: NEGATIVE  / Bili: NEGATIVE / Urobili: NORMAL mg/dL   Blood: NEGATIVE / Protein: NEGATIVE mg/dL / Nitrite: NEGATIVE   Leuk Esterase: NEGATIVE / RBC: 2-5 / WBC 2-5   Sq Epi: OCC / Non Sq Epi: x / Bacteria: FEW      REVIEW OF SYSTEMS:  CONSTITUTIONAL: No fever, weight loss, or fatigue  EYES: No eye pain, visual disturbances, or discharge  ENMT:  No difficulty hearing, tinnitus, vertigo; No sinus or throat pain  NECK: No pain or stiffness  BREASTS: No pain, masses, or nipple discharge  RESPIRATORY: No cough, wheezing, chills or hemoptysis;  shortness of breath  CARDIOVASCULAR: No chest pain, palpitations, dizziness, or leg swelling  GASTROINTESTINAL: No abdominal or epigastric pain. No nausea, vomiting, or hematemesis; No diarrhea or constipation. No melena or hematochezia.  GENITOURINARY: No dysuria, frequency, hematuria, or incontinence  NEUROLOGICAL: No headaches, memory loss, loss of strength, numbness, or tremors      Consultant(s) Notes Reviewed:  [x ] YES  [ ] NO    PHYSICAL EXAM:  GENERAL: NAD, well-groomed, well-developed, not in any distress ,  HEAD:  Atraumatic, Normocephalic  EYES: EOMI, PERRLA, conjunctiva and sclera clear  ENMT: No tonsillar erythema, exudates, or enlargement; Moist mucous membranes, Good dentition, No lesions  NECK: Supple, No JVD, Normal thyroid  NERVOUS SYSTEM:  Alert & Oriented X3, No focal deficit   CHEST/LUNG: Good air entry bilateral except bases  with basal  rales,   HEART: Regular rate and rhythm; No murmurs, rubs, or gallops  ABDOMEN: Soft, Nontender, Nondistended; Bowel sounds present  EXTREMITIES:  2+ Peripheral Pulses, No clubbing, cyanosis, or edema  Bilateral painful ROM and tenderness + ankles     Care Discussed with Consultants/Other Providers [ x] YES  [ ] NO
INTERVAL HPI/OVERNIGHT EVENTS: My both ankles hurting .   Vital Signs Last 24 Hrs  T(C): 36.4 (2018 09:50), Max: 36.8 (2018 18:09)  T(F): 97.5 (2018 09:50), Max: 98.3 (2018 18:09)  HR: 89 (2018 09:50) (80 - 89)  BP: 113/79 (2018 09:50) (104/80 - 122/76)  BP(mean): --  RR: 17 (2018 09:50) (16 - 17)  SpO2: 98% (2018 09:50) (98% - 100%)  I&O's Summary    2018 07:01  -  2018 07:00  --------------------------------------------------------  IN: 900 mL / OUT: 1250 mL / NET: -350 mL    2018 07:01  -  2018 12:12  --------------------------------------------------------  IN: 240 mL / OUT: 800 mL / NET: -560 mL      MEDICATIONS  (STANDING):  aspirin enteric coated 81 milliGRAM(s) Oral daily  calcitriol   Capsule 0.25 MICROGram(s) Oral at bedtime  calcitriol   Capsule 0.5 MICROGram(s) Oral daily  calcium acetate 667 milliGRAM(s) Oral three times a day with meals  calcium carbonate 1250 mG (OsCal) 1 Tablet(s) Oral two times a day  carvedilol 3.125 milliGRAM(s) Oral every 12 hours  cholecalciferol 1000 Unit(s) Oral daily  dextrose 5%. 1000 milliLiter(s) (50 mL/Hr) IV Continuous <Continuous>  dextrose 50% Injectable 12.5 Gram(s) IV Push once  dextrose 50% Injectable 25 Gram(s) IV Push once  dextrose 50% Injectable 25 Gram(s) IV Push once  famotidine    Tablet 20 milliGRAM(s) Oral two times a day  furosemide   Injectable 80 milliGRAM(s) IV Push two times a day  heparin  Injectable 5000 Unit(s) SubCutaneous every 12 hours  insulin lispro (HumaLOG) corrective regimen sliding scale   SubCutaneous three times a day before meals  insulin lispro (HumaLOG) corrective regimen sliding scale   SubCutaneous at bedtime  levothyroxine 150 MICROGram(s) Oral daily  lisinopril 10 milliGRAM(s) Oral daily  magnesium oxide 400 milliGRAM(s) Oral three times a day with meals    MEDICATIONS  (PRN):  acetaminophen   Tablet. 650 milliGRAM(s) Oral every 6 hours PRN Mild Pain (1 - 3)  dextrose Gel 1 Dose(s) Oral once PRN Blood Glucose LESS THAN 70 milliGRAM(s)/deciliter  glucagon  Injectable 1 milliGRAM(s) IntraMuscular once PRN Glucose LESS THAN 70 milligrams/deciliter    LABS:                        12.6   7.28  )-----------( 233      ( 2018 07:30 )             38.2     -20    129<L>  |  87<L>  |  27<H>  ----------------------------<  132<H>  3.9   |  24  |  1.25    Ca    6.8<L>      2018 07:30  Phos  5.4       Mg     1.7         TPro  6.7  /  Alb  3.0<L>  /  TBili  3.0<H>  /  DBili  1.7<H>  /  AST  59<H>  /  ALT  43<H>  /  AlkPhos  262<H>        Urinalysis Basic - ( 2018 22:00 )    Color: PLYEL / Appearance: CLEAR / S.006 / pH: 6.5  Gluc: NEGATIVE / Ketone: NEGATIVE  / Bili: NEGATIVE / Urobili: NORMAL mg/dL   Blood: NEGATIVE / Protein: NEGATIVE mg/dL / Nitrite: NEGATIVE   Leuk Esterase: NEGATIVE / RBC: 2-5 / WBC 2-5   Sq Epi: OCC / Non Sq Epi: x / Bacteria: FEW      CAPILLARY BLOOD GLUCOSE      POCT Blood Glucose.: 119 mg/dL (2018 09:38)  POCT Blood Glucose.: 159 mg/dL (2018 21:40)  POCT Blood Glucose.: 131 mg/dL (2018 18:20)  POCT Blood Glucose.: 160 mg/dL (2018 13:16)        Urinalysis Basic - ( 2018 22:00 )    Color: PLYEL / Appearance: CLEAR / S.006 / pH: 6.5  Gluc: NEGATIVE / Ketone: NEGATIVE  / Bili: NEGATIVE / Urobili: NORMAL mg/dL   Blood: NEGATIVE / Protein: NEGATIVE mg/dL / Nitrite: NEGATIVE   Leuk Esterase: NEGATIVE / RBC: 2-5 / WBC 2-5   Sq Epi: OCC / Non Sq Epi: x / Bacteria: FEW      REVIEW OF SYSTEMS:  CONSTITUTIONAL: No fever, weight loss, or fatigue  EYES: No eye pain, visual disturbances, or discharge  ENMT:  No difficulty hearing, tinnitus, vertigo; No sinus or throat pain  NECK: No pain or stiffness  BREASTS: No pain, masses, or nipple discharge  RESPIRATORY: No cough, wheezing, chills or hemoptysis; shortness of breath  CARDIOVASCULAR: No chest pain, palpitations, dizziness,but  both  leg swelling  GASTROINTESTINAL: No abdominal or epigastric pain. No nausea, vomiting, or hematemesis; No diarrhea or constipation. No melena or hematochezia.  GENITOURINARY: No dysuria, frequency, hematuria, or incontinence  NEUROLOGICAL: No headaches, memory loss, loss of strength, numbness, or tremors  SKIN: No itching, burning, rashes, or lesions   LYMPH NODES: No enlarged glands  ENDOCRINE: No heat or cold intolerance; No hair loss  MUSCULOSKELETAL: ankle  joint pain   PSYCHIATRIC: No depression, anxiety, mood swings, or difficulty sleeping  HEME/LYMPH: No easy bruising, or bleeding gums  ALLERY AND IMMUNOLOGIC: No hives or eczema    RADIOLOGY & ADDITIONAL TESTS:    Consultant(s) Notes Reviewed:  [x ] YES  [ ] NO    PHYSICAL EXAM:  GENERAL: NAD, well-groomed, well-developed,not in any distress ,  HEAD:  Atraumatic, Normocephalic  EYES: EOMI, PERRLA, conjunctiva and sclera clear  ENMT: No tonsillar erythema, exudates, or enlargement; Moist mucous membranes, Good dentition, No lesions  NECK: Supple, No JVD, Normal thyroid  NERVOUS SYSTEM:  Alert & Oriented X3, No focal deficit   CHEST/LUNG: Good air entry bilateral with no  rales, rhonchi, wheezing, or rubs  HEART: Regular rate and rhythm; No murmurs, rubs, or gallops  ABDOMEN: Soft, Nontender, Nondistended; Bowel sounds present  EXTREMITIES:  2+ Peripheral Pulses, No clubbing, cyanosis, but 2+ edema  SKIN: No rashes or lesions    Care Discussed with Consultants/Other Providers [ x] YES  [ ] NO
INTERVAL HPI/OVERNIGHT EVENTS: Seen and examined with daughter in room . My dorsum of feet hurting with discoloration.   Vital Signs Last 24 Hrs  T(C): 36.7 (23 Apr 2018 05:37), Max: 36.7 (22 Apr 2018 21:47)  T(F): 98 (23 Apr 2018 05:37), Max: 98.1 (22 Apr 2018 21:47)  HR: 77 (23 Apr 2018 05:37) (75 - 78)  BP: 102/73 (23 Apr 2018 05:37) (101/62 - 104/75)  BP(mean): --  RR: 16 (23 Apr 2018 05:37) (14 - 16)  SpO2: 100% (23 Apr 2018 05:37) (100% - 100%)  I&O's Summary    22 Apr 2018 07:01  -  23 Apr 2018 07:00  --------------------------------------------------------  IN: 990 mL / OUT: 2900 mL / NET: -1910 mL    23 Apr 2018 07:01  -  23 Apr 2018 13:38  --------------------------------------------------------  IN: 240 mL / OUT: 700 mL / NET: -460 mL      MEDICATIONS  (STANDING):  aspirin enteric coated 81 milliGRAM(s) Oral daily  BACItracin   Ointment 1 Application(s) Topical two times a day  calcitriol   Capsule 0.25 MICROGram(s) Oral at bedtime  calcitriol   Capsule 0.5 MICROGram(s) Oral daily  calcium acetate 1334 milliGRAM(s) Oral three times a day with meals  calcium carbonate 1250 mG (OsCal) 2 Tablet(s) Oral two times a day  carvedilol 3.125 milliGRAM(s) Oral every 12 hours  cholecalciferol 1000 Unit(s) Oral daily  colchicine 0.6 milliGRAM(s) Oral two times a day  dextrose 5%. 1000 milliLiter(s) (50 mL/Hr) IV Continuous <Continuous>  dextrose 50% Injectable 12.5 Gram(s) IV Push once  dextrose 50% Injectable 25 Gram(s) IV Push once  dextrose 50% Injectable 25 Gram(s) IV Push once  famotidine    Tablet 20 milliGRAM(s) Oral two times a day  furosemide   Injectable 80 milliGRAM(s) IV Push two times a day  heparin  Injectable 5000 Unit(s) SubCutaneous every 12 hours  insulin glargine Injectable (LANTUS) 6 Unit(s) SubCutaneous at bedtime  insulin lispro (HumaLOG) corrective regimen sliding scale   SubCutaneous three times a day before meals  insulin lispro (HumaLOG) corrective regimen sliding scale   SubCutaneous at bedtime  levothyroxine 150 MICROGram(s) Oral daily  lisinopril 10 milliGRAM(s) Oral daily  magnesium oxide 400 milliGRAM(s) Oral three times a day with meals    MEDICATIONS  (PRN):  acetaminophen   Tablet. 650 milliGRAM(s) Oral every 6 hours PRN Mild Pain (1 - 3)  dextrose Gel 1 Dose(s) Oral once PRN Blood Glucose LESS THAN 70 milliGRAM(s)/deciliter  glucagon  Injectable 1 milliGRAM(s) IntraMuscular once PRN Glucose LESS THAN 70 milligrams/deciliter    LABS:                        12.2   6.25  )-----------( 230      ( 23 Apr 2018 06:56 )             37.3     04-23    131<L>  |  87<L>  |  26<H>  ----------------------------<  103<H>  3.4<L>   |  27  |  1.32<H>    Ca    7.2<L>      23 Apr 2018 06:56  Phos  5.5     04-23  Mg     1.7     04-23          CAPILLARY BLOOD GLUCOSE      POCT Blood Glucose.: 142 mg/dL (23 Apr 2018 12:31)  POCT Blood Glucose.: 107 mg/dL (23 Apr 2018 09:09)  POCT Blood Glucose.: 206 mg/dL (22 Apr 2018 21:30)  POCT Blood Glucose.: 137 mg/dL (22 Apr 2018 17:51)          REVIEW OF SYSTEMS:  CONSTITUTIONAL: No fever, weight loss, or fatigue  EYES: No eye pain, visual disturbances, or discharge  ENMT:  No difficulty hearing, tinnitus, vertigo; No sinus or throat pain  NECK: No pain or stiffness  BREASTS: No pain, masses, or nipple discharge  RESPIRATORY: No cough, wheezing, chills or hemoptysis; No shortness of breath  CARDIOVASCULAR: No chest pain, palpitations, dizziness, or leg swelling  GASTROINTESTINAL: No abdominal or epigastric pain. No nausea, vomiting, or hematemesis; No diarrhea or constipation. No melena or hematochezia.  GENITOURINARY: No dysuria, frequency, hematuria, or incontinence  NEUROLOGICAL: No headaches, memory loss, loss of strength, numbness, or tremors  SKIN: No itching, burning, rashes, or lesions   LYMPH NODES: No enlarged glands  ENDOCRINE: No heat or cold intolerance; No hair loss  MUSCULOSKELETAL: feet pain .    Consultant(s) Notes Reviewed:  [x ] YES  [ ] NO    PHYSICAL EXAM:  GENERAL: NAD, well-groomed, well-developed, not in any distress ,  HEAD:  Atraumatic, Normocephalic  EYES: EOMI, PERRLA, conjunctiva and sclera clear  ENMT: No tonsillar erythema, exudates, or enlargement; Moist mucous membranes, Good dentition, No lesions  NECK: Supple, ++ JVD, Normal thyroid  NERVOUS SYSTEM:  Alert & Oriented X3, No focal deficit   CHEST/LUNG: Good air entry bilateral with basal  rales  HEART: Regular rate and rhythm; No murmurs, rubs, or gallops  ABDOMEN: Soft, Nontender, Nondistended; Bowel sounds present  EXTREMITIES:   No clubbing, cyanosis, but 2+ edema    Care Discussed with Consultants/Other Providers [ x] YES  [ ] NO
INTERVAL HPI/OVERNIGHT EVENTS:No new concerns . I feel better and want to go home.   Vital Signs Last 24 Hrs  T(C): 36.4 (26 Apr 2018 06:22), Max: 36.7 (25 Apr 2018 19:56)  T(F): 97.6 (26 Apr 2018 06:22), Max: 98 (25 Apr 2018 19:56)  HR: 80 (26 Apr 2018 06:22) (77 - 80)  BP: 115/69 (26 Apr 2018 06:22) (106/79 - 140/90)  BP(mean): --  RR: 16 (26 Apr 2018 06:22) (16 - 17)  SpO2: 100% (26 Apr 2018 06:22) (100% - 100%)  I&O's Summary    25 Apr 2018 07:01  -  26 Apr 2018 07:00  --------------------------------------------------------  IN: 760 mL / OUT: 2900 mL / NET: -2140 mL    26 Apr 2018 07:01  -  26 Apr 2018 09:09  --------------------------------------------------------  IN: 0 mL / OUT: 900 mL / NET: -900 mL      MEDICATIONS  (STANDING):  aspirin enteric coated 81 milliGRAM(s) Oral daily  calcitriol   Capsule 0.5 MICROGram(s) Oral two times a day  calcium acetate 1334 milliGRAM(s) Oral three times a day with meals  calcium carbonate 1250 mG (OsCal) 2 Tablet(s) Oral three times a day  calcium gluconate IVPB 1 Gram(s) IV Intermittent once  carvedilol 3.125 milliGRAM(s) Oral every 12 hours  cholecalciferol 1000 Unit(s) Oral daily  colchicine 0.6 milliGRAM(s) Oral two times a day  dextrose 5%. 1000 milliLiter(s) (50 mL/Hr) IV Continuous <Continuous>  dextrose 50% Injectable 12.5 Gram(s) IV Push once  dextrose 50% Injectable 25 Gram(s) IV Push once  dextrose 50% Injectable 25 Gram(s) IV Push once  famotidine    Tablet 20 milliGRAM(s) Oral two times a day  furosemide   Injectable 80 milliGRAM(s) IV Push two times a day  heparin  Injectable 5000 Unit(s) SubCutaneous every 12 hours  insulin glargine Injectable (LANTUS) 6 Unit(s) SubCutaneous at bedtime  insulin lispro (HumaLOG) corrective regimen sliding scale   SubCutaneous three times a day before meals  insulin lispro (HumaLOG) corrective regimen sliding scale   SubCutaneous at bedtime  levothyroxine 150 MICROGram(s) Oral daily  lisinopril 10 milliGRAM(s) Oral daily  magnesium oxide 400 milliGRAM(s) Oral three times a day with meals  potassium chloride    Tablet ER 20 milliEquivalent(s) Oral once    MEDICATIONS  (PRN):  acetaminophen   Tablet. 650 milliGRAM(s) Oral every 6 hours PRN Mild Pain (1 - 3)  acetaminophen   Tablet. 650 milliGRAM(s) Oral every 6 hours PRN Moderate Pain (4 - 6)  dextrose Gel 1 Dose(s) Oral once PRN Blood Glucose LESS THAN 70 milliGRAM(s)/deciliter  glucagon  Injectable 1 milliGRAM(s) IntraMuscular once PRN Glucose LESS THAN 70 milligrams/deciliter    LABS:                        11.8   5.02  )-----------( 209      ( 26 Apr 2018 05:45 )             36.2     04-26    134<L>  |  89<L>  |  27<H>  ----------------------------<  117<H>  3.4<L>   |  30  |  1.22    Ca    7.2<L>      26 Apr 2018 05:45  Phos  4.9     04-26  Mg     1.8     04-26    TPro  6.5  /  Alb  3.1<L>  /  TBili  2.3<H>  /  DBili  1.5<H>  /  AST  84<H>  /  ALT  75<H>  /  AlkPhos  280<H>  04-26        CAPILLARY BLOOD GLUCOSE      POCT Blood Glucose.: 98 mg/dL (26 Apr 2018 08:43)  POCT Blood Glucose.: 190 mg/dL (25 Apr 2018 21:45)  POCT Blood Glucose.: 178 mg/dL (25 Apr 2018 17:51)  POCT Blood Glucose.: 151 mg/dL (25 Apr 2018 12:37)          REVIEW OF SYSTEMS:  CONSTITUTIONAL: No fever, weight loss, or fatigue  EYES: No eye pain, visual disturbances, or discharge  ENMT:  No difficulty hearing, tinnitus, vertigo; No sinus or throat pain  NECK: No pain or stiffness  BREASTS: No pain, masses, or nipple discharge  RESPIRATORY: No cough, wheezing, chills or hemoptysis; No shortness of breath  CARDIOVASCULAR: No chest pain, palpitations, dizziness, or leg swelling  GASTROINTESTINAL: No abdominal or epigastric pain. No nausea, vomiting, or hematemesis; No diarrhea or constipation. No melena or hematochezia.  GENITOURINARY: No dysuria, frequency, hematuria, or incontinence  NEUROLOGICAL: No headaches, memory loss, loss of strength, numbness, or tremors  SKIN: No itching, burning, rashes, or lesions   LYMPH NODES: No enlarged glands  ENDOCRINE: No heat or cold intolerance; No hair loss  MUSCULOSKELETAL: No joint pain or swelling; No muscle, back, or extremity pain  PSYCHIATRIC: No depression, anxiety, mood swings, or difficulty sleeping  HEME/LYMPH: No easy bruising, or bleeding gums  ALLERY AND IMMUNOLOGIC: No hives or eczema    RADIOLOGY & ADDITIONAL TESTS:    Consultant(s) Notes Reviewed:  [x ] YES  [ ] NO    PHYSICAL EXAM:  GENERAL: NAD, well-groomed, well-developed, not in any distress ,  HEAD:  Atraumatic, Normocephalic  EYES: EOMI, PERRLA, conjunctiva and sclera clear  ENMT: No tonsillar erythema, exudates, or enlargement; Moist mucous membranes, Good dentition, No lesions  NECK: Supple, No JVD, Normal thyroid  NERVOUS SYSTEM:  Alert & Oriented X3, No focal deficit   CHEST/LUNG: Good air entry bilateral with no  rales, rhonchi, wheezing, or rubs  HEART: Regular rate and rhythm; No murmurs, rubs, or gallops  ABDOMEN: Soft, Nontender, Nondistended; Bowel sounds present  EXTREMITIES:  2+ Peripheral Pulses, No clubbing, cyanosis, but less  edema  SKIN: No rashes or lesions    Care Discussed with Consultants/Other Providers [ x] YES  [ ] NO
Nephrology Progress Note    No acute events overnight  Patient c/o ankle pains, no SOB or chest pain, LE edema improving      PHYSICAL EXAM    Vital Signs Last 24 Hrs  T(C): 36.4 (2018 09:50), Max: 36.8 (2018 18:09)  T(F): 97.5 (2018 09:50), Max: 98.3 (2018 18:09)  HR: 89 (2018 09:50) (80 - 89)  BP: 113/79 (2018 09:50) (104/80 - 122/76)  BP(mean): --  RR: 17 (2018 09:50) (16 - 17)  SpO2: 98% (2018 09:50) (98% - 100%)  I&O's Summary    2018 07:01  -  2018 07:00  --------------------------------------------------------  IN: 900 mL / OUT: 1250 mL / NET: -350 mL    2018 07:01  -  2018 11:16  --------------------------------------------------------  IN: 240 mL / OUT: 800 mL / NET: -560 mL    GA: awake and alert, no distress  EYES: EOMI, clear conj, anicteric sclera  ENT: MMM, clear OP, neck supple  LUNGS: CTABL, no wrc, normal effort  HEART; RS1S2 normal, no mrg, +++ peripheral edema  ABD; Soft, NT/ND/BS+, no peritoneal signs  EXT; well perfused, ++ edema up to mid abdomen;   ; no arteaga  NEURO: AAO x 3, sensation and motor intact  SKIN: warm and dry, no rash on visible skin    LABORATORY DATA:                        12.6   7.28  )-----------( 233      ( 2018 07:30 )             38.2     04-20    129<L>  |  87<L>  |  27<H>  ----------------------------<  132<H>  3.9   |  24  |  1.25    Ca    6.8<L>      2018 07:30  Phos  5.4     -  Mg     1.7         TPro  6.7  /  Alb  3.0<L>  /  TBili  3.0<H>  /  DBili  1.7<H>  /  AST  59<H>  /  ALT  43<H>  /  AlkPhos  262<H>      LIVER FUNCTIONS - ( 2018 07:30 )  Alb: 3.0 g/dL / Pro: 6.7 g/dL / ALK PHOS: 262 u/L / ALT: 43 u/L / AST: 59 u/L / GGT: x           Urinalysis Basic - ( 2018 22:00 )    Color: PLYEL / Appearance: CLEAR / S.006 / pH: 6.5  Gluc: NEGATIVE / Ketone: NEGATIVE  / Bili: NEGATIVE / Urobili: NORMAL mg/dL   Blood: NEGATIVE / Protein: NEGATIVE mg/dL / Nitrite: NEGATIVE   Leuk Esterase: NEGATIVE / RBC: 2-5 / WBC 2-5   Sq Epi: OCC / Non Sq Epi: x / Bacteria: FEW      IMAGING:    ASSESSMENT:  This is a 63 y/o F with h/o HFrEF, HTN, DM, asthma, thyroid Ca post thyroidectomy complicated with hypoparathyroidism, ch hypocalcemia and hypomagnesemia who is admitted with CHF exacerbation and started on lasix IV. Nephrology consulted for management of hypocalcemia and CKD III A    1. Hyponatremia likely multifactorial; likely CRS/Hypervolemia; UOsm 214- inappropriately high >100;   2. Hypocalcemia likely due to primary hypoparathyroidisms in the setting of parathyroidectomy when thyroidectomy done few years back; on calcium and active vitamin D supplements at home  3. Hypomagnesemia on magnesium supplements- Mag level seems stable   4. CKD 3 likely due to prior AKIs; no proteinuria and UA bland  5. Hypervolemia and CHF exacerbation on lasix 80 mg BID IV  6. Hypoalbuminemia  7. Hypophosphatemia due to hypoparathyroidism      RECOMMENDATIONS:  - BMP daily while inpatient  - serum Mag and phos daily  - ionized calcium daily and IV supplement if less than 1  - protein intake; ensure with meals TID  - limit fluid intake to less than 1 liter a day  - continue lasix 80 mg IV BID; consider continues drip if UOP unsatisfactory and no significant improvement next 24 hrs  - calcium carbonate 1250 mg BID; increase dose /frequency if needed  - continue phoslo 667 mg TID with meals  - continue calcitriol 0.5 mcg AM and 0.25 mcg PM  - avoid nephrotoxins including NSAIDs, Iodinated contrast, Aminoglycoside etc as much as possible,   - strict record of input and output, daily weight; keep net negative  - renally dose all medications for a GFR ~ 60 mL/min/1.73 square meters     Rest pe primary team    Larisa Lay MD  [facility]  [phone]
Nephrology Progress Note    No acute events overnight  Patient feels better, improving peripheral edema and able to move easier  No CP or sob    PHYSICAL EXAM    Vital Signs Last 24 Hrs  T(C): 36.7 (23 Apr 2018 05:37), Max: 36.7 (22 Apr 2018 21:47)  T(F): 98 (23 Apr 2018 05:37), Max: 98.1 (22 Apr 2018 21:47)  HR: 77 (23 Apr 2018 05:37) (75 - 78)  BP: 102/73 (23 Apr 2018 05:37) (101/62 - 104/75)  BP(mean): --  RR: 16 (23 Apr 2018 05:37) (14 - 16)  SpO2: 100% (23 Apr 2018 05:37) (100% - 100%)  I&O's Summary    22 Apr 2018 07:01  -  23 Apr 2018 07:00  --------------------------------------------------------  IN: 990 mL / OUT: 2900 mL / NET: -1910 mL    23 Apr 2018 07:01  -  23 Apr 2018 12:24  --------------------------------------------------------  IN: 240 mL / OUT: 700 mL / NET: -460 mL      GA: awake and alert, no distress  EYES: EOMI, clear conj, anicteric sclera  ENT: MMM, clear OP, neck supple  LUNGS: CTABL, no wrc, normal effort  HEART; RS1S2 normal, no mrg, ++ peripheral edema  ABD; Soft, NT/ND/BS+, no peritoneal signs  EXT; well perfused, ++ edema up to mid abdomen;   ; no arteaga  NEURO: AAO x 3, sensation and motor intact  SKIN: warm and dry, no rash on visible skin    LABORATORY DATA:                        12.2   6.25  )-----------( 230      ( 23 Apr 2018 06:56 )             37.3     04-23    131<L>  |  87<L>  |  26<H>  ----------------------------<  103<H>  3.4<L>   |  27  |  1.32<H>    Ca    7.2<L>      23 Apr 2018 06:56  Phos  5.5     04-23  Mg     1.7     04-23      IMAGING:  < from: US Abdomen Limited (04.20.18 @ 09:34) >    IMPRESSION:     No ascites.  Gallbladder not identified and likely contracted.    < end of copied text >    ASSESSMENT:  This is a 61 y/o F with h/o HFrEF, HTN, DM, asthma, thyroid Ca post thyroidectomy complicated with hypoparathyroidism, ch hypocalcemia and hypomagnesemia who is admitted with CHF exacerbation and started on lasix IV. Nephrology consulted for management of hypocalcemia and CKD III A    1. Hyponatremia likely multifactorial; likely CRS/Hypervolemia; UOsm 214- inappropriately high >100;   2. Hypocalcemia likely due to primary hypoparathyroidisms in the setting of parathyroidectomy when thyroidectomy done few years back; on calcium and active vitamin D supplements at home  3. Hypomagnesemia on magnesium supplements- Mag level seems stable   4. CKD 3 likely due to prior AKIs; no proteinuria and UA bland  5. Hypervolemia and CHF exacerbation on lasix 80 mg BID IV  6. Hypoalbuminemia  7. Hypophosphatemia due to hypoparathyroidism  8. Hypokalemia due to diuresis      RECOMMENDATIONS:  - agree with K supplement as ordered by primary tea; continue KCl 40 mEq daily PRN for K+<3.5  - calcium gluconate 1 gram IV today  - continue lasix 80 mg IV BID  - calcium carbonate 2 tabs BID  - increase phoslo 1334 mg TID with meals  - continue calcitriol 0.5 mcg AM and 0.25 mcg PM  - BMP daily while inpatient  - serum Mag and phos daily  - ionized calcium daily and IV supplement if less than 1  - limit fluid intake to less than 1 liter a day  - strict record of input and output, daily weight; keep net negative;  - avoid nephrotoxins including NSAIDs, Iodinated contrast, Aminoglycoside etc as much as possible,   - renally dose all medications for a GFR ~ 60 mL/min/1.73 square meters     Rest pe primary team    Larisa Lay MD  [facility]  [phone]
Nephrology Progress Note    No acute events overnight  Patient seen and evaluated this morning; no complaints, feels better  feet pain still but swelling is better    PHYSICAL EXAM    Vital Signs Last 24 Hrs  T(C): 36.3 (24 Apr 2018 12:58), Max: 36.9 (23 Apr 2018 22:29)  T(F): 97.3 (24 Apr 2018 12:58), Max: 98.5 (23 Apr 2018 22:29)  HR: 74 (24 Apr 2018 12:58) (73 - 87)  BP: 107/71 (24 Apr 2018 12:58) (100/71 - 122/83)  BP(mean): 97 (24 Apr 2018 05:52) (90 - 97)  RR: 18 (24 Apr 2018 12:58) (18 - 18)  SpO2: 100% (24 Apr 2018 12:58) (98% - 100%)  I&O's Summary    23 Apr 2018 07:01  -  24 Apr 2018 07:00  --------------------------------------------------------  IN: 900 mL / OUT: 2000 mL / NET: -1100 mL    24 Apr 2018 07:01  -  24 Apr 2018 15:23  --------------------------------------------------------  IN: 460 mL / OUT: 800 mL / NET: -340 mL      GA: awake and alert, no distress  EYES: EOMI, clear conj, anicteric sclera  ENT: MMM, clear OP, neck supple  LUNGS: CTABL, no wrc, normal effort  HEART; RS1S2 normal, no mrg, ++ peripheral edema  ABD; Soft, NT/ND/BS+, no peritoneal signs  EXT; well perfused, ++ edema up to mid abdomen;   ; no arteaga  NEURO: AAO x 3, sensation and motor intact  SKIN: warm and dry, no rash on visible skin    LABORATORY DATA:                        12.3   6.42  )-----------( 222      ( 24 Apr 2018 06:19 )             37.4     04-24    132<L>  |  87<L>  |  27<H>  ----------------------------<  115<H>  4.1   |  27  |  1.28    Ca    7.3<L>      24 Apr 2018 06:19  Phos  5.2     04-24  Mg     1.8     04-24    TPro  6.8  /  Alb  3.2<L>  /  TBili  2.3<H>  /  DBili  1.3<H>  /  AST  89<H>  /  ALT  70<H>  /  AlkPhos  279<H>  04-24    IMAGING:  < from: US Abdomen Limited (04.20.18 @ 09:34) >    IMPRESSION:     No ascites.  Gallbladder not identified and likely contracted.    < end of copied text >    ASSESSMENT:  This is a 61 y/o F with h/o HFrEF, HTN, DM, asthma, thyroid Ca post thyroidectomy complicated with hypoparathyroidism, ch hypocalcemia and hypomagnesemia who is admitted with CHF exacerbation and started on lasix IV. Nephrology consulted for management of hypocalcemia and CKD III A    1. Hyponatremia likely multifactorial; likely CRS/Hypervolemia; UOsm 214- inappropriately high >100; slowly improving  2. Hypocalcemia likely due to primary hypoparathyroidisms in the setting of parathyroidectomy when thyroidectomy done few years back; on calcium and active vitamin D supplements at home - slowly improving on supplements  3. Hypomagnesemia on magnesium supplements- Mag level seems stable   4. CKD 3 likely due to prior AKIs; no proteinuria and UA bland - cr fluctuating but around her baseline and better today  5. Hypervolemia and CHF exacerbation on lasix IV  6. Hypoalbuminemia  7. Hyperphosphatemia due to hypoparathyroidism  8. Hypokalemia due to diuresis - improved      RECOMMENDATIONS:  - continue lasix 80 mg IV BID per cardiology- still significant peripheral edema  - calcium carbonate 2 tabs BID  - phoslo 1334 mg TID with meals  - increase calcitriol 0.5 mcg BID  - BMP daily while inpatient  - serum Mag and phos daily  - ionized calcium daily and IV supplement if less than 1  - limit fluid intake to less than 1 liter a day  - strict record of input and output, daily weight; keep net negative;  - avoid nephrotoxins including NSAIDs, Iodinated contrast, Aminoglycoside etc as much as possible,   - renally dose all medications for a GFR ~ 60 mL/min/1.73 square meters     Rest of management per primary team    Larisa Lay MD  [facility]  [phone]
Nephrology Progress Note  Patient had an episode of Vtach on telemetry- no symptoms reported  Denies sob, chest pain, palpitations etc  Feels better, improving peripheral edema    PHYSICAL EXAM    Vital Signs Last 24 Hrs  T(C): 37.1 (25 Apr 2018 05:49), Max: 37.1 (25 Apr 2018 05:49)  T(F): 98.8 (25 Apr 2018 05:49), Max: 98.8 (25 Apr 2018 05:49)  HR: 78 (25 Apr 2018 05:49) (70 - 78)  BP: 120/70 (25 Apr 2018 05:49) (103/70 - 127/78)  BP(mean): --  RR: 16 (25 Apr 2018 05:49) (16 - 18)  SpO2: 99% (25 Apr 2018 05:49) (98% - 100%)  I&O's Summary    24 Apr 2018 07:01  -  25 Apr 2018 07:00  --------------------------------------------------------  IN: 660 mL / OUT: 1900 mL / NET: -1240 mL    GA: awake and alert, no distress  EYES: EOMI, clear conj, anicteric sclera  ENT: MMM, clear OP, neck supple  LUNGS: CTABL, no wrc, normal effort  HEART; RS1S2 normal, no mrg, improving peripheral edema  ABD; Soft, NT/ND/BS+, no peritoneal signs  EXT; well perfused, improving edema  ; no arteaga  NEURO: AAO x 3, sensation and motor intact  SKIN: warm and dry, no rash on visible skin    LABORATORY DATA:                        12.3   6.42  )-----------( 222      ( 24 Apr 2018 06:19 )             37.4     04-24    132<L>  |  87<L>  |  27<H>  ----------------------------<  115<H>  4.1   |  27  |  1.28    Ca    7.3<L>      24 Apr 2018 06:19  Phos  5.2     04-24  Mg     1.8     04-24    TPro  6.8  /  Alb  3.2<L>  /  TBili  2.3<H>  /  DBili  1.3<H>  /  AST  89<H>  /  ALT  70<H>  /  AlkPhos  279<H>  04-24    IMAGING:  < from: US Abdomen Limited (04.20.18 @ 09:34) >    IMPRESSION:     No ascites.  Gallbladder not identified and likely contracted.    < end of copied text >    ASSESSMENT:  This is a 63 y/o F with h/o HFrEF, HTN, DM, asthma, thyroid Ca post thyroidectomy complicated with hypoparathyroidism, ch hypocalcemia and hypomagnesemia who is admitted with CHF exacerbation and started on lasix IV. Nephrology consulted for management of hypocalcemia and CKD III A    1. Hyponatremia likely multifactorial; likely CRS/Hypervolemia; UOsm 214- inappropriately high (>100); slowly improving  2. Hypocalcemia likely due to primary hypoparathyroidisms in the setting of parathyroidectomy when thyroidectomy done few years back; on calcium and active vitamin D supplements at home - slowly improving on supplements and no related symptoms  3. Hypomagnesemia on magnesium supplements- Mag level seems stable   4. CKD 3 likely due to prior AKIs; no proteinuria and UA bland - cr fluctuating but around her baseline and better today  5. Hypervolemia and CHF exacerbation on lasix IV- improving  6. Hypoalbuminemia  7. Hyperphosphatemia due to hypoparathyroidism  8. Hypokalemia due to diuresis - improved      RECOMMENDATIONS:  - calcium gluconate 1 gram once; ionized calcium remains low  - continue lasix 80 mg IV BID per cardiology- still significant peripheral edema  - calcium carbonate 2 tabs BID;   - phoslo 1334 mg TID with meals; check phos level in AM  - continue calcitriol 0.5 mcg BID  - BMP daily while inpatient  - ionized calcium daily while inpatient and IV supplement if less than 1 or related symptoms  - limit fluid intake to less than 1 liter a day  - strict record of input and output, daily weight; keep net negative;  - avoid nephrotoxins including NSAIDs, Iodinated contrast, Aminoglycoside etc as much as possible,   - renally dose all medications for a GFR ~ 60 mL/min/1.73 square meters     Rest of management per primary team      Larisa Lay MD  [facility]  [phone]
Patient seen and examined in bed; reports b/l ankle pain but has found relief post colchicine administration.  NAD.  Patient reports significant improvement of edema.    REVIEW OF SYSTEMS:  As per HPI, otherwise 8 full 10 ROS were unremarkable    MEDICATIONS  (STANDING):  aspirin enteric coated 81 milliGRAM(s) Oral daily  calcitriol   Capsule 0.25 MICROGram(s) Oral at bedtime  calcitriol   Capsule 0.5 MICROGram(s) Oral daily  calcium acetate 667 milliGRAM(s) Oral three times a day with meals  calcium carbonate 1250 mG (OsCal) 1 Tablet(s) Oral two times a day  calcium gluconate IVPB 2 Gram(s) IV Intermittent once  carvedilol 3.125 milliGRAM(s) Oral every 12 hours  cholecalciferol 1000 Unit(s) Oral daily  colchicine 0.6 milliGRAM(s) Oral two times a day  dextrose 5%. 1000 milliLiter(s) (50 mL/Hr) IV Continuous <Continuous>  dextrose 50% Injectable 12.5 Gram(s) IV Push once  dextrose 50% Injectable 25 Gram(s) IV Push once  dextrose 50% Injectable 25 Gram(s) IV Push once  famotidine    Tablet 20 milliGRAM(s) Oral two times a day  furosemide   Injectable 80 milliGRAM(s) IV Push two times a day  heparin  Injectable 5000 Unit(s) SubCutaneous every 12 hours  insulin glargine Injectable (LANTUS) 6 Unit(s) SubCutaneous at bedtime  insulin lispro (HumaLOG) corrective regimen sliding scale   SubCutaneous three times a day before meals  insulin lispro (HumaLOG) corrective regimen sliding scale   SubCutaneous at bedtime  levothyroxine 150 MICROGram(s) Oral daily  lisinopril 10 milliGRAM(s) Oral daily  magnesium oxide 400 milliGRAM(s) Oral three times a day with meals  potassium chloride    Tablet ER 40 milliEquivalent(s) Oral every 4 hours      VITAL:  T(C): , Max: 36.7 (18 @ 20:26)  T(F): , Max: 98 (18 @ 20:26)  HR: 72 (18 @ 12:47)  BP: 103/76 (18 @ 12:47)  BP(mean): --  RR: 17 (18 @ 12:47)  SpO2: 97% (18 @ 12:47)  Wt(kg): --    I and O's:     @ 07:  -   @ 07:00  --------------------------------------------------------  IN: 680 mL / OUT: 2050 mL / NET: -1370 mL     @ 07:  -   @ 16:14  --------------------------------------------------------  IN: 240 mL / OUT: 0 mL / NET: 240 mL          PHYSICAL EXAM:    Constitutional: NAD  HEENT: PERRLA, EOMI,  MMM  Neck: No LAD, No JVD  Respiratory: diminished b/l  Cardiovascular: S1 and S2  Gastrointestinal: BS+, soft, NT; +distension   Extremities: ++ l/e edema  Neurological: A/O x 3, no focal deficits  Psychiatric: Normal mood, normal affect  : No Gustafson  Skin: No rashes  Access: Not applicable    LABS:                        12.4   6.91  )-----------( 226      ( 2018 06:50 )             38.2     -21    130<L>  |  87<L>  |  27<H>  ----------------------------<  143<H>  3.5   |  27  |  1.29    Ca    6.7<L>      2018 06:50  Phos  5.0       Mg     1.8         TPro  6.7  /  Alb  3.0<L>  /  TBili  3.0<H>  /  DBili  1.7<H>  /  AST  59<H>  /  ALT  43<H>  /  AlkPhos  262<H>        Urine Studies:  Urinalysis Basic - ( 2018 22:00 )    Color: PLYEL / Appearance: CLEAR / S.006 / pH: 6.5  Gluc: NEGATIVE / Ketone: NEGATIVE  / Bili: NEGATIVE / Urobili: NORMAL mg/dL   Blood: NEGATIVE / Protein: NEGATIVE mg/dL / Nitrite: NEGATIVE   Leuk Esterase: NEGATIVE / RBC: 2-5 / WBC 2-5   Sq Epi: OCC / Non Sq Epi: x / Bacteria: FEW      Sodium, Random Urine: 45 mmol/L ( @ 22:00)  Potassium, Random Urine: 19.9 mmol/L ( @ 22:00)  Chloride, Random Urine: 47 mmol/L ( @ 22:00)  Creatinine, Random Urine: 22.97 mg/dL ( @ 22:00)  Osmolality, Random Urine: 214 mosmo/kg ( @ 22:00)      ASSESSMENT:  This is a 61 y/o F with h/o HFrEF, HTN, DM, asthma, thyroid Ca post thyroidectomy complicated with hypoparathyroidism, ch hypocalcemia and hypomagnesemia who is admitted with CHF exacerbation and started on lasix IV. Nephrology consulted for management of hypocalcemia and CKD III A    1. Hyponatremia likely multifactorial; likely CRS/Hypervolemia; UOsm 214- inappropriately high >100; Na+ slowly improving  2. Hypocalcemia likely due to primary hypoparathyroidisms in the setting of parathyroidectomy when thyroidectomy done few years back; on calcium and active vitamin D supplements at home; Ca+ trending down  3. Hypomagnesemia on magnesium supplements- stable  4. CKD 3 likely due to prior AKIs; no proteinuria and UA bland  5. Hypervolemia and CHF exacerbation on lasix 80 mg BID IV  6. Hypoalbuminemia  7. Hypophosphatemia due to hypoparathyroidism; slowly trending down  8. Hypokalemia; dt diuretics; mild    RECOMMENDATIONS:  - BMP daily while inpatient  - serum Mag and phos daily  - A/w Calcium gluconate 2 G IV x 1 dose               - ionized calcium daily and IV supplement if less than 1; will repeat tomorrow AM  - A/w KCL PO 40 meq x 2 doses as ordered  - increase calcium carbonate 2 tabs BID for now  - continue calcitriol 0.5 mcg AM and 0.25 mcg PM  - limit fluid intake to less than 1 liter a day  - continue lasix 80 mg IV BID for now  - continue phoslo 667 mg TID with meals  - strict record of input and output, daily weight; keep net negative  - avoid nephrotoxins including NSAIDs, Iodinated contrast, Aminoglycoside etc as much as possible,   - renally dose all medications for a GFR ~ 60 mL/min/1.73 square meters
Patient seen and examined in chair; no new events overnight.  Denies ankle pain.  L/e edema further improving.  NAD.    REVIEW OF SYSTEMS:  As per HPI, otherwise 8 full 10 ROS were unremarkable    MEDICATIONS  (STANDING):  aspirin enteric coated 81 milliGRAM(s) Oral daily  calcitriol   Capsule 0.25 MICROGram(s) Oral at bedtime  calcitriol   Capsule 0.5 MICROGram(s) Oral daily  calcium acetate 667 milliGRAM(s) Oral three times a day with meals  calcium carbonate 1250 mG (OsCal) 2 Tablet(s) Oral two times a day  carvedilol 3.125 milliGRAM(s) Oral every 12 hours  cholecalciferol 1000 Unit(s) Oral daily  colchicine 0.6 milliGRAM(s) Oral two times a day  dextrose 5%. 1000 milliLiter(s) (50 mL/Hr) IV Continuous <Continuous>  dextrose 50% Injectable 12.5 Gram(s) IV Push once  dextrose 50% Injectable 25 Gram(s) IV Push once  dextrose 50% Injectable 25 Gram(s) IV Push once  famotidine    Tablet 20 milliGRAM(s) Oral two times a day  furosemide   Injectable 80 milliGRAM(s) IV Push two times a day  heparin  Injectable 5000 Unit(s) SubCutaneous every 12 hours  insulin glargine Injectable (LANTUS) 6 Unit(s) SubCutaneous at bedtime  insulin lispro (HumaLOG) corrective regimen sliding scale   SubCutaneous three times a day before meals  insulin lispro (HumaLOG) corrective regimen sliding scale   SubCutaneous at bedtime  levothyroxine 150 MICROGram(s) Oral daily  lisinopril 10 milliGRAM(s) Oral daily  magnesium oxide 400 milliGRAM(s) Oral three times a day with meals      VITAL:  T(C): , Max: 36.7 (04-22-18 @ 05:09)  T(F): , Max: 98 (04-22-18 @ 05:09)  HR: 78 (04-22-18 @ 11:57)  BP: 113/84 (04-22-18 @ 11:57)  BP(mean): --  RR: 16 (04-22-18 @ 11:57)  SpO2: 100% (04-22-18 @ 11:57)  Wt(kg): --    I and O's:    04-21 @ 07:01  -  04-22 @ 07:00  --------------------------------------------------------  IN: 540 mL / OUT: 2300 mL / NET: -1760 mL    04-22 @ 07:01  - 04-22 @ 16:03  --------------------------------------------------------  IN: 640 mL / OUT: 700 mL / NET: -60 mL          PHYSICAL EXAM:    Constitutional: NAD  HEENT: PERRLA, EOMI,  MMM  Neck: No LAD, No JVD  Respiratory: diminished b/l  Cardiovascular: S1 and S2  Gastrointestinal: BS+, soft, NT; +distension   Extremities: ++ l/e edema  Neurological: A/O x 3, no focal deficits  Psychiatric: Normal mood, normal affect  : No Gustafson  Skin: No rashes  Access: Not applicable    LABS:                        12.1   5.86  )-----------( 213      ( 22 Apr 2018 07:15 )             36.5     04-22    132<L>  |  88<L>  |  27<H>  ----------------------------<  148<H>  3.8   |  29  |  1.26    Ca    7.2<L>      22 Apr 2018 07:15  Phos  5.0     04-21  Mg     1.8     04-21      ASSESSMENT:  This is a 61 y/o F with h/o HFrEF, HTN, DM, asthma, thyroid Ca post thyroidectomy complicated with hypoparathyroidism, ch hypocalcemia and hypomagnesemia who is admitted with CHF exacerbation and started on lasix IV. Nephrology consulted for management of hypocalcemia and CKD III A    1. Hyponatremia likely multifactorial; likely CRS/Hypervolemia; UOsm 214- inappropriately high >100; Na+ continues to improve  2. Hypocalcemia likely due to primary hypoparathyroidisms in the setting of parathyroidectomy when thyroidectomy done few years back; on calcium and active vitamin D supplements at home; calcium supplements improving; s/p calcium IV ( yesterday & today);  Ca++ improving.  3. Hypomagnesemia on magnesium supplements- stable as of late  4. CKD 3 likely due to prior AKIs; no proteinuria and UA bland  5. Hypervolemia and CHF exacerbation on lasix 80 mg BID IV; volume status overall improving   6. Hypoalbuminemia  7. Hypophosphatemia due to hypoparathyroidism; slowly trending down as of late  8. Hypokalemia; dt diuretics; mild; improved    RECOMMENDATIONS:  - BMP daily while inpatient  - serum Mag and phos daily  - A/w Calcium gluconate 2 G IV x 1 dose administered today              - ionized calcium daily and IV supplement if less than 1; will repeat tomorrow AM  - continue calcium carbonate 2 tabs BID for now  - continue calcitriol 0.5 mcg AM and 0.25 mcg PM  - limit fluid intake to less than 1 liter a day  - continue lasix 80 mg IV BID for now  - continue phoslo 667 mg TID with meals  - strict record of input and output, daily weight; keep net negative  - avoid nephrotoxins including NSAIDs, Iodinated contrast, Aminoglycoside etc as much as possible,   - renally dose all medications for a GFR ~ 60 mL/min/1.73 square meters
Subjective:   	 no chest pain   shortness of breath   improving           c/o ankle pain       MEDICATIONS  (STANDING):  aspirin enteric coated 81 milliGRAM(s) Oral daily  calcitriol   Capsule 0.25 MICROGram(s) Oral at bedtime  calcitriol   Capsule 0.5 MICROGram(s) Oral daily  calcium acetate 667 milliGRAM(s) Oral three times a day with meals  calcium carbonate 1250 mG (OsCal) 1 Tablet(s) Oral two times a day  carvedilol 3.125 milliGRAM(s) Oral every 12 hours  cholecalciferol 1000 Unit(s) Oral daily  colchicine 0.6 milliGRAM(s) Oral two times a day  dextrose 5%. 1000 milliLiter(s) (50 mL/Hr) IV Continuous <Continuous>  dextrose 50% Injectable 12.5 Gram(s) IV Push once  dextrose 50% Injectable 25 Gram(s) IV Push once  dextrose 50% Injectable 25 Gram(s) IV Push once  famotidine    Tablet 20 milliGRAM(s) Oral two times a day  furosemide   Injectable 80 milliGRAM(s) IV Push two times a day  heparin  Injectable 5000 Unit(s) SubCutaneous every 12 hours  insulin glargine Injectable (LANTUS) 6 Unit(s) SubCutaneous at bedtime  insulin lispro (HumaLOG) corrective regimen sliding scale   SubCutaneous three times a day before meals  insulin lispro (HumaLOG) corrective regimen sliding scale   SubCutaneous at bedtime  levothyroxine 150 MICROGram(s) Oral daily  lisinopril 10 milliGRAM(s) Oral daily  magnesium oxide 400 milliGRAM(s) Oral three times a day with meals    MEDICATIONS  (PRN):  acetaminophen   Tablet. 650 milliGRAM(s) Oral every 6 hours PRN Mild Pain (1 - 3)  dextrose Gel 1 Dose(s) Oral once PRN Blood Glucose LESS THAN 70 milliGRAM(s)/deciliter  glucagon  Injectable 1 milliGRAM(s) IntraMuscular once PRN Glucose LESS THAN 70 milligrams/deciliter      LABS:                        12.4   6.91  )-----------( 226      ( 21 Apr 2018 06:50 )             38.2     Hemoglobin: 12.4 g/dL (04-21 @ 06:50)  Hemoglobin: 12.6 g/dL (04-20 @ 07:30)  Hemoglobin: 13.0 g/dL (04-19 @ 05:45)  Hemoglobin: 12.9 g/dL (04-19 @ 00:15)  Hemoglobin: 13.0 g/dL (04-18 @ 17:30)    04-21    130<L>  |  87<L>  |  27<H>  ----------------------------<  143<H>  3.5   |  27  |  1.29    Ca    6.7<L>      21 Apr 2018 06:50  Phos  5.0     04-21  Mg     1.8     04-21    TPro  6.7  /  Alb  3.0<L>  /  TBili  3.0<H>  /  DBili  1.7<H>  /  AST  59<H>  /  ALT  43<H>  /  AlkPhos  262<H>  04-20    Creatinine Trend: 1.29<--, 1.25<--, 1.17<--, 1.12<--, 1.14<--, 1.19<--     CARDIAC MARKERS ( 19 Apr 2018 00:15 )  x     / < 0.06 ng/mL / 718 u/L / 6.53 ng/mL / x      CARDIAC MARKERS ( 18 Apr 2018 17:30 )  x     / < 0.06 ng/mL / x     / x     / x            PHYSICAL EXAM  Vital Signs Last 24 Hrs  T(C): 36.6 (21 Apr 2018 12:47), Max: 36.7 (20 Apr 2018 20:26)  T(F): 97.9 (21 Apr 2018 12:47), Max: 98 (20 Apr 2018 20:26)  HR: 72 (21 Apr 2018 12:47) (72 - 94)  BP: 103/76 (21 Apr 2018 12:47) (103/76 - 127/77)  BP(mean): --  RR: 17 (21 Apr 2018 12:47) (16 - 17)  SpO2: 97% (21 Apr 2018 12:47) (97% - 98%)    Cardiovascular: Normal S1 S2,     No JVD, 1/6 ROSALINDA murmur, Peripheral pulses palpable 2+ bilaterally  Respiratory: Lungs clear to auscultation, normal effort 	  Gastrointestinal:  Soft, Non-tender, + BS	  Extremities + edema,  no cyanosis, clubbing B/L LE's       TELEMETRY: 	paced     ECG: Atrial-sensed ventricular-paced rhythm   	  RADIOLOGY:   CXR    Cardiomegaly with clear lungs.       DIAGNOSTIC TESTING:  [ ] Echocardiogram:  [ ]  Catheterization:  [ ] Stress Test:    OTHER: 	  < from: Transthoracic Echocardiogram (05.22.14 @ 11:45) >  CONCLUSIONS:  1. Tethered mitral valve leaflets with normal opening.  Moderate-severe mitral regurgitation.  2. Left atrial enlargement.  3. Mild left ventricular enlargement.  Eccentric left  ventricular hypertrophy (dilated left ventricle with normal  relative wall thickness).  4. Severe global left ventricular systolic dysfunction.  5. Moderate right atrial enlargement.  6. Right ventricular enlargement with decreased right  ventricular systolic function.  7. Normal tricuspid valve. Severe tricuspid regurgitation.  8. Estimated pulmonary artery systolic pressure equals 50  mm Hg, assuming right atrial pressure equals 10  mm Hg,  consistent with moderate pulmonary hypertension.    < end of copied text >    < from: US Abdomen Limited (04.20.18 @ 09:34) >  IMPRESSION:     No ascites.  Gallbladder not identified and likely contracted.    < end of copied text >      ASSESSMENT/PLAN: 	62y Female with past medical history of hypertension, hyperlipidemia, chronic RBBB,  obesity, thyroid CA,  nonischemic cardiomyopathy with an ejection fraction of about 25% and normal coronaries on recent  cardiac catheterization at [facility] with Dr Chavarria , s/p [employer] ICD placement in June 2017  admitted with complaints of progressively worsening abdominal distention, bilateral lower extremity edema and shortness of breath.  She states about 2 weeks ago she was admitted at [facility] for hypocalcemia at which time her diuretics were held. She was placed back on Torsemide upon discharge however despite medication and dietary compliance, she has developed worsening LE edema and abdominal distention and SOB. She denies any anginal chest pain, palpitations, syncope or near syncope or ICD fire.     -- The patient has ruled out for acute coronary syndrome with negative serial cardiac enzymes  -- would continue with Lasix 80 mg  IV BID  diuresis  - to keep output> input - if not diuresing well may need to give Torsemide   -- monitor electrolytes supplement as required  -- c/w BB/ACE for NICM  -- repeat TTE to assess LV function/r/o pericardial effusion  --  ICD interrogation w/ One episode of NSVT @ 214 b/min x 8 beats on 4/18/2018.  No atrial tachyarrhythmias. Optivol fluid level increased beyond the threshold since March 1, 2018.  Total BiV pacing 99%             --  abdominal ultrasound w/o ascites   -- would obtain records from Dr Chavarria  -- HD stable  -- final recs pending above  -- of note patient states she would like to follow with a cardiologist in this area as going to [facility] to see Dr Chavarria is not possible for her anymore
Subjective:   	 no chest pain   shortness of breath   improving           c/o ankle pain       MEDICATIONS  (STANDING):  aspirin enteric coated 81 milliGRAM(s) Oral daily  calcitriol   Capsule 0.25 MICROGram(s) Oral at bedtime  calcitriol   Capsule 0.5 MICROGram(s) Oral daily  calcium acetate 667 milliGRAM(s) Oral three times a day with meals  calcium carbonate 1250 mG (OsCal) 2 Tablet(s) Oral two times a day  carvedilol 3.125 milliGRAM(s) Oral every 12 hours  cholecalciferol 1000 Unit(s) Oral daily  colchicine 0.6 milliGRAM(s) Oral two times a day  dextrose 5%. 1000 milliLiter(s) (50 mL/Hr) IV Continuous <Continuous>  dextrose 50% Injectable 12.5 Gram(s) IV Push once  dextrose 50% Injectable 25 Gram(s) IV Push once  dextrose 50% Injectable 25 Gram(s) IV Push once  famotidine    Tablet 20 milliGRAM(s) Oral two times a day  furosemide   Injectable 80 milliGRAM(s) IV Push two times a day  heparin  Injectable 5000 Unit(s) SubCutaneous every 12 hours  insulin glargine Injectable (LANTUS) 6 Unit(s) SubCutaneous at bedtime  insulin lispro (HumaLOG) corrective regimen sliding scale   SubCutaneous three times a day before meals  insulin lispro (HumaLOG) corrective regimen sliding scale   SubCutaneous at bedtime  levothyroxine 150 MICROGram(s) Oral daily  lisinopril 10 milliGRAM(s) Oral daily  magnesium oxide 400 milliGRAM(s) Oral three times a day with meals    MEDICATIONS  (PRN):  acetaminophen   Tablet. 650 milliGRAM(s) Oral every 6 hours PRN Mild Pain (1 - 3)  dextrose Gel 1 Dose(s) Oral once PRN Blood Glucose LESS THAN 70 milliGRAM(s)/deciliter  glucagon  Injectable 1 milliGRAM(s) IntraMuscular once PRN Glucose LESS THAN 70 milligrams/deciliter      LABS:                        12.1   5.86  )-----------( 213      ( 22 Apr 2018 07:15 )             36.5     Hemoglobin: 12.1 g/dL (04-22 @ 07:15)  Hemoglobin: 12.4 g/dL (04-21 @ 06:50)  Hemoglobin: 12.6 g/dL (04-20 @ 07:30)  Hemoglobin: 13.0 g/dL (04-19 @ 05:45)  Hemoglobin: 12.9 g/dL (04-19 @ 00:15)    04-22    132<L>  |  88<L>  |  27<H>  ----------------------------<  148<H>  3.8   |  29  |  1.26    Ca    7.2<L>      22 Apr 2018 07:15  Phos  5.0     04-21  Mg     1.8     04-21      Creatinine Trend: 1.26<--, 1.29<--, 1.25<--, 1.17<--, 1.12<--, 1.14<--           PHYSICAL EXAM  Vital Signs Last 24 Hrs  T(C): 36.2 (22 Apr 2018 11:57), Max: 36.7 (22 Apr 2018 05:09)  T(F): 97.1 (22 Apr 2018 11:57), Max: 98 (22 Apr 2018 05:09)  HR: 78 (22 Apr 2018 11:57) (70 - 78)  BP: 113/84 (22 Apr 2018 11:57) (110/77 - 120/70)  BP(mean): --  RR: 16 (22 Apr 2018 11:57) (16 - 17)  SpO2: 100% (22 Apr 2018 11:57) (98% - 100%)    Cardiovascular: Normal S1 S2,  No JVD, 1/6 ROSALINDA murmur,  Respiratory: Lungs clear to auscultation, normal effort 	  Gastrointestinal:  Soft, Non-tender, + BS	  Extremities + edema,  no cyanosis, clubbing B/L LE's    Peripheral pulses palpable 2+ bilaterally      TELEMETRY: 	paced     ECG: Atrial-sensed ventricular-paced rhythm   	  RADIOLOGY:   CXR    Cardiomegaly with clear lungs.       DIAGNOSTIC TESTING:  [ ] Echocardiogram:  [ ]  Catheterization:  [ ] Stress Test:    OTHER: 	  < from: Transthoracic Echocardiogram (05.22.14 @ 11:45) >  CONCLUSIONS:  1. Tethered mitral valve leaflets with normal opening.  Moderate-severe mitral regurgitation.  2. Left atrial enlargement.  3. Mild left ventricular enlargement.  Eccentric left  ventricular hypertrophy (dilated left ventricle with normal  relative wall thickness).  4. Severe global left ventricular systolic dysfunction.  5. Moderate right atrial enlargement.  6. Right ventricular enlargement with decreased right  ventricular systolic function.  7. Normal tricuspid valve. Severe tricuspid regurgitation.  8. Estimated pulmonary artery systolic pressure equals 50  mm Hg, assuming right atrial pressure equals 10  mm Hg,  consistent with moderate pulmonary hypertension.    < end of copied text >    < from: US Abdomen Limited (04.20.18 @ 09:34) >  IMPRESSION:     No ascites.  Gallbladder not identified and likely contracted.    < end of copied text >      ASSESSMENT/PLAN: 	62y Female with past medical history of hypertension, hyperlipidemia, chronic RBBB,  obesity, thyroid CA,  nonischemic cardiomyopathy with an ejection fraction of about 25% and normal coronaries on recent  cardiac catheterization at [facility] with Dr Chavarria , s/p [employer] ICD placement in June 2017  admitted with complaints of progressively worsening abdominal distention, bilateral lower extremity edema and shortness of breath.  She states about 2 weeks ago she was admitted at [facility] for hypocalcemia at which time her diuretics were held. She was placed back on Torsemide upon discharge however despite medication and dietary compliance, she has developed worsening LE edema and abdominal distention and SOB. She denies any anginal chest pain, palpitations, syncope or near syncope or ICD fire.     -- The patient has ruled out for acute coronary syndrome with negative serial cardiac enzymes  -- would continue with Lasix 80 mg  IV BID  diuresis  - to keep output> input - if not diuresing well may need to give Torsemide   -- monitor electrolytes supplement as required  -- c/w BB/ACE for NICM  -- repeat TTE to assess LV function/r/o pericardial effusion  --  ICD interrogation w/ One episode of NSVT @ 214 b/min x 8 beats on 4/18/2018.  No atrial tachyarrhythmias. Optivol fluid level increased beyond the threshold since March 1, 2018.  Total BiV pacing 99%             --  abdominal ultrasound w/o ascites   -- would obtain records from Dr Chavarria  -- HD stable  -- final recs pending above  -- of note patient states she would like to follow with a cardiologist in this area [facility] not possible for her anymore
Subjective:   	 no chest pain   shortness of breath   improving     MEDICATIONS:  MEDICATIONS  (STANDING):  aspirin enteric coated 81 milliGRAM(s) Oral daily  calcitriol   Capsule 0.25 MICROGram(s) Oral at bedtime  calcitriol   Capsule 0.5 MICROGram(s) Oral daily  calcium acetate 667 milliGRAM(s) Oral three times a day with meals  calcium carbonate 1250 mG (OsCal) 1 Tablet(s) Oral two times a day  carvedilol 3.125 milliGRAM(s) Oral every 12 hours  cholecalciferol 1000 Unit(s) Oral daily  dextrose 5%. 1000 milliLiter(s) (50 mL/Hr) IV Continuous <Continuous>  dextrose 50% Injectable 12.5 Gram(s) IV Push once  dextrose 50% Injectable 25 Gram(s) IV Push once  dextrose 50% Injectable 25 Gram(s) IV Push once  famotidine    Tablet 20 milliGRAM(s) Oral two times a day  furosemide   Injectable 80 milliGRAM(s) IV Push two times a day  heparin  Injectable 5000 Unit(s) SubCutaneous every 12 hours  insulin lispro (HumaLOG) corrective regimen sliding scale   SubCutaneous three times a day before meals  insulin lispro (HumaLOG) corrective regimen sliding scale   SubCutaneous at bedtime  levothyroxine 150 MICROGram(s) Oral daily  lisinopril 10 milliGRAM(s) Oral daily  magnesium oxide 400 milliGRAM(s) Oral three times a day with meals    MEDICATIONS  (PRN):  acetaminophen   Tablet. 650 milliGRAM(s) Oral every 6 hours PRN Mild Pain (1 - 3)  dextrose Gel 1 Dose(s) Oral once PRN Blood Glucose LESS THAN 70 milliGRAM(s)/deciliter  glucagon  Injectable 1 milliGRAM(s) IntraMuscular once PRN Glucose LESS THAN 70 milligrams/deciliter      LABS:	 	    CARDIAC MARKERS:  CARDIAC MARKERS ( 19 Apr 2018 00:15 )  x     / < 0.06 ng/mL / 718 u/L / 6.53 ng/mL / x      CARDIAC MARKERS ( 18 Apr 2018 17:30 )  x     / < 0.06 ng/mL / x     / x     / x                                    12.6   7.28  )-----------( 233      ( 20 Apr 2018 07:30 )             38.2     04-20    129<L>  |  87<L>  |  27<H>  ----------------------------<  132<H>  3.9   |  24  |  1.25    Ca    6.8<L>      20 Apr 2018 07:30  Phos  5.4     04-20  Mg     1.7     04-20    TPro  6.7  /  Alb  3.0<L>  /  TBili  3.0<H>  /  DBili  1.7<H>  /  AST  59<H>  /  ALT  43<H>  /  AlkPhos  262<H>  04-20    COAGS:       proBNP:   Lipid Profile:   HgA1c:   TSH:       PHYSICAL EXAM:  T(C): 36.5 (04-20-18 @ 12:30), Max: 36.8 (04-19-18 @ 18:09)  HR: 92 (04-20-18 @ 12:30) (80 - 92)  BP: 105/72 (04-20-18 @ 12:30) (104/80 - 122/76)  RR: 17 (04-20-18 @ 12:30) (16 - 17)  SpO2: 100% (04-20-18 @ 12:30) (98% - 100%)  Wt(kg): --  I&O's Summary    19 Apr 2018 07:01  -  20 Apr 2018 07:00  --------------------------------------------------------  IN: 900 mL / OUT: 1250 mL / NET: -350 mL    20 Apr 2018 07:01  -  20 Apr 2018 13:38  --------------------------------------------------------  IN: 240 mL / OUT: 800 mL / NET: -560 mL      Height (cm): 170.18 (04-19 @ 21:03)  Weight (kg): 114.7 (04-20 @ 05:56)  BMI (kg/m2): 39.6 (04-20 @ 05:56)  BSA (m2): 2.23 (04-20 @ 05:56)    	    Cardiovascular: Normal S1 S2,     No JVD, 1/6 ROSALINDA murmur, Peripheral pulses palpable 2+ bilaterally  Respiratory: Lungs clear to auscultation, normal effort 	  Gastrointestinal:  Soft, Non-tender, + BS	  Extremities + edema,  no cyanosis, clubbing B/L LE's       TELEMETRY: 	paced     ECG: Atrial-sensed ventricular-paced rhythm   	  RADIOLOGY:   CXR    Cardiomegaly with clear lungs.       DIAGNOSTIC TESTING:  [ ] Echocardiogram:  [ ]  Catheterization:  [ ] Stress Test:    OTHER: 	  < from: Transthoracic Echocardiogram (05.22.14 @ 11:45) >  CONCLUSIONS:  1. Tethered mitral valve leaflets with normal opening.  Moderate-severe mitral regurgitation.  2. Left atrial enlargement.  3. Mild left ventricular enlargement.  Eccentric left  ventricular hypertrophy (dilated left ventricle with normal  relative wall thickness).  4. Severe global left ventricular systolic dysfunction.  5. Moderate right atrial enlargement.  6. Right ventricular enlargement with decreased right  ventricular systolic function.  7. Normal tricuspid valve. Severe tricuspid regurgitation.  8. Estimated pulmonary artery systolic pressure equals 50  mm Hg, assuming right atrial pressure equals 10  mm Hg,  consistent with moderate pulmonary hypertension.    < end of copied text >    < from: US Abdomen Limited (04.20.18 @ 09:34) >  IMPRESSION:     No ascites.  Gallbladder not identified and likely contracted.    < end of copied text >      ASSESSMENT/PLAN: 	62y Female with past medical history of hypertension, hyperlipidemia, chronic RBBB,  obesity, thyroid CA,  nonischemic cardiomyopathy with an ejection fraction of about 25% and normal coronaries on recent  cardiac catheterization at [facility] with Dr Chavarria , s/p [employer] ICD placement in June 2017  admitted with complaints of progressively worsening abdominal distention, bilateral lower extremity edema and shortness of breath.  She states about 2 weeks ago she was admitted at [place] for hypocalcemia at which time her diuretics were held. She was placed back on Torsemide upon discharge however despite medication and dietary compliance, she has developed worsening LE edema and abdominal distention and SOB. She denies any anginal chest pain, palpitations, syncope or near syncope or ICD fire.     -- The patient has ruled out for acute coronary syndrome with negative serial cardiac enzymes  -- would continue with Lasix 80 mg  IV BID  diuresis  - to keep output> input - if not diuresing well may need to give Torsemide   -- monitor electrolytes supplement as required  -- c/w BB/ACE for NICM  -- repeat TTE to assess LV function/r/o pericardial effusion  --  ICD interrogation One episode of NSVT @ 214 b/min x 8 beats on 4/18/2018.  No atrial tachyarrhythmias. Optivol fluid level increased beyond the threshold since March 1, 2018.  Total BiV pacing 99%             --  abdominal ultrasound w/o ascites   -- would obtain records from Dr Chavarria  -- HD stable  -- final recs pending above  -- of note patient states she would like to follow with a cardiologist in this area as going to [facility] to see Dr Chavarria is not possible for her anymore
Subjective:  No CP, SOB improved  	    MEDICATIONS  (STANDING):  aspirin enteric coated 81 milliGRAM(s) Oral daily  BACItracin   Ointment 1 Application(s) Topical two times a day  calcitriol   Capsule 0.25 MICROGram(s) Oral at bedtime  calcitriol   Capsule 0.5 MICROGram(s) Oral daily  calcium acetate 1334 milliGRAM(s) Oral three times a day with meals  calcium carbonate 1250 mG (OsCal) 2 Tablet(s) Oral two times a day  carvedilol 3.125 milliGRAM(s) Oral every 12 hours  cholecalciferol 1000 Unit(s) Oral daily  colchicine 0.6 milliGRAM(s) Oral two times a day  famotidine    Tablet 20 milliGRAM(s) Oral two times a day  furosemide   Injectable 80 milliGRAM(s) IV Push two times a day  heparin  Injectable 5000 Unit(s) SubCutaneous every 12 hours  insulin glargine Injectable (LANTUS) 6 Unit(s) SubCutaneous at bedtime  insulin lispro (HumaLOG) corrective regimen sliding scale   SubCutaneous three times a day before meals  insulin lispro (HumaLOG) corrective regimen sliding scale   SubCutaneous at bedtime  levothyroxine 150 MICROGram(s) Oral daily  lisinopril 10 milliGRAM(s) Oral daily  magnesium oxide 400 milliGRAM(s) Oral three times a day with meals    MEDICATIONS  (PRN):  acetaminophen   Tablet. 650 milliGRAM(s) Oral every 6 hours PRN Mild Pain (1 - 3)  dextrose Gel 1 Dose(s) Oral once PRN Blood Glucose LESS THAN 70 milliGRAM(s)/deciliter  glucagon  Injectable 1 milliGRAM(s) IntraMuscular once PRN Glucose LESS THAN 70 milligrams/deciliter      LABS:                        12.2   6.25  )-----------( 230      ( 23 Apr 2018 06:56 )             37.3     131<L>  |  87<L>  |  26<H>  ----------------------------<  103<H>  3.4<L>   |  27  |  1.32<H>    Ca    7.2<L>      23 Apr 2018 06:56  Phos  5.5     04-23  Mg     1.7     04-23    Creatinine Trend: 1.32<--, 1.26<--, 1.29<--, 1.25<--, 1.17<--, 1.12<--     PHYSICAL EXAM  Vital Signs Last 24 Hrs  T(C): 36.7 (23 Apr 2018 05:37), Max: 36.7 (22 Apr 2018 21:47)  T(F): 98 (23 Apr 2018 05:37), Max: 98.1 (22 Apr 2018 21:47)  HR: 77 (23 Apr 2018 05:37) (75 - 78)  BP: 102/73 (23 Apr 2018 05:37) (101/62 - 104/75)  RR: 16 (23 Apr 2018 05:37) (14 - 16)  SpO2: 100% (23 Apr 2018 05:37) (100% - 100%)    Cardiovascular: Normal S1 S2,  No JVD, 1/6 ROSALINDA murmur,  Respiratory: Lungs clear to auscultation, normal effort 	  Gastrointestinal:  Soft, Non-tender, + BS	  Extremities + edema,  no cyanosis, clubbing B/L LE's     DATA:    TELEMETRY: 	V paced     	  < from: TTE with Doppler (w/Cont) (05.16.17 @ 09:19) >  CONCLUSIONS:  1. Mitral annular calcification, otherwise normal mitral  valve. Moderate mitral regurgitation.  2. Moderately dilated left atrium.  LA volume index = 46  cc/m2.  3. Severe left ventricular enlargement.  4. Severe global left ventricular systolic dysfunction.  Endocardial visualization enhanced with intravenous  injection of echo contrast (Definity).  5. Normal right ventricular size with decreased right  ventricular systolic function.  6. Estimated right ventricular systolic pressure equals 53  mm Hg, assuming right atrial pressure equals 10 mm Hg,  consistent with moderate pulmonary hypertension.  *** Compared with echocardiogram of 5/22/2014, no  significant changes noted.  ------------------------------------------------------------------------  Confirmed on  5/16/2017 - 11:49:02 by Xander Tyson M.D.    < end of copied text >    < from: US Abdomen Limited (04.20.18 @ 09:34) >  IMPRESSION:     No ascites.  Gallbladder not identified and likely contracted.    < end of copied text >      ASSESSMENT/PLAN: 	62y Female with past medical history of hypertension, hyperlipidemia, chronic RBBB,  obesity, thyroid CA,  nonischemic cardiomyopathy with an ejection fraction of about 25% and normal coronaries on recent  cardiac catheterization at [facility] with Dr Chavarria , s/p [employer] ICD placement in June 2017  admitted with complaints of progressively worsening abdominal distention, bilateral lower extremity edema and shortness of breath.  She states about 2 weeks ago she was admitted at [place] for hypocalcemia at which time her diuretics were held. She was placed back on Torsemide upon discharge however despite medication and dietary compliance, she has developed worsening LE edema and abdominal distention and SOB. She denies any anginal chest pain, palpitations, syncope or near syncope or ICD fire.     -- The patient has ruled out for acute coronary syndrome with negative serial cardiac enzymes  -- would continue with Lasix 80 mg  IV BID  diuresis  - to keep output> input - if not diuresing well may need to give Torsemide   -- monitor electrolytes supplement as required  -- c/w BB/ACE for NICM  -- repeat TTE to assess LV function/r/o pericardial effusion  --  ICD interrogation w/ One episode of NSVT @ 214 b/min x 8 beats on 4/18/2018.  No atrial tachyarrhythmias. Optivol fluid level increased beyond the threshold since March 1, 2018.  Total BiV pacing 99%             --  abdominal ultrasound w/o ascites   -- would obtain records from Dr Chavarria  -- HD stable  -- final recs pending above  -- of note patient states she would like to follow with a cardiologist in this area as going to [facility] to see Dr Chavarria is not possible for her anymore
Subjective:  No CP, SOB improved  	    MEDICATIONS  (STANDING):  aspirin enteric coated 81 milliGRAM(s) Oral daily  calcitriol   Capsule 0.5 MICROGram(s) Oral two times a day  calcium acetate 1334 milliGRAM(s) Oral three times a day with meals  calcium carbonate 1250 mG (OsCal) 2 Tablet(s) Oral three times a day  carvedilol 3.125 milliGRAM(s) Oral every 12 hours  cholecalciferol 1000 Unit(s) Oral daily  colchicine 0.6 milliGRAM(s) Oral two times a day  dextrose 5%. 1000 milliLiter(s) (50 mL/Hr) IV Continuous <Continuous>  dextrose 50% Injectable 12.5 Gram(s) IV Push once  dextrose 50% Injectable 25 Gram(s) IV Push once  dextrose 50% Injectable 25 Gram(s) IV Push once  famotidine    Tablet 20 milliGRAM(s) Oral two times a day  furosemide   Injectable 80 milliGRAM(s) IV Push two times a day  heparin  Injectable 5000 Unit(s) SubCutaneous every 12 hours  insulin glargine Injectable (LANTUS) 6 Unit(s) SubCutaneous at bedtime  insulin lispro (HumaLOG) corrective regimen sliding scale   SubCutaneous three times a day before meals  insulin lispro (HumaLOG) corrective regimen sliding scale   SubCutaneous at bedtime  levothyroxine 150 MICROGram(s) Oral daily  lisinopril 10 milliGRAM(s) Oral daily  magnesium oxide 400 milliGRAM(s) Oral three times a day with meals    MEDICATIONS  (PRN):  acetaminophen   Tablet. 650 milliGRAM(s) Oral every 6 hours PRN Mild Pain (1 - 3)  acetaminophen   Tablet. 650 milliGRAM(s) Oral every 6 hours PRN Moderate Pain (4 - 6)  dextrose Gel 1 Dose(s) Oral once PRN Blood Glucose LESS THAN 70 milliGRAM(s)/deciliter  glucagon  Injectable 1 milliGRAM(s) IntraMuscular once PRN Glucose LESS THAN 70 milligrams/deciliter      LABS:                        11.8   5.02  )-----------( 209      ( 26 Apr 2018 05:45 )             36.2     134<L>  |  89<L>  |  27<H>  ----------------------------<  117<H>  3.4<L>   |  30  |  1.22    Ca    7.2<L>      26 Apr 2018 05:45  Phos  4.9     04-26  Mg     1.8     04-26    TPro  6.5  /  Alb  3.1<L>  /  TBili  2.3<H>  /  DBili  1.5<H>  /  AST  84<H>  /  ALT  75<H>  /  AlkPhos  280<H>  04-26  Creatinine Trend: 1.22<--, 1.31<--, 1.28<--, 1.32<--, 1.26<--, 1.29<--    PHYSICAL EXAM  Vital Signs Last 24 Hrs  T(C): 36.4 (26 Apr 2018 06:22), Max: 36.7 (25 Apr 2018 19:56)  T(F): 97.6 (26 Apr 2018 06:22), Max: 98 (25 Apr 2018 19:56)  HR: 80 (26 Apr 2018 06:22) (77 - 80)  BP: 115/69 (26 Apr 2018 06:22) (106/79 - 140/90)  RR: 16 (26 Apr 2018 06:22) (16 - 17)  SpO2: 100% (26 Apr 2018 06:22) (100% - 100%)    Cardiovascular: Normal S1 S2,  No JVD, 1/6 ROSALINDA murmur,  Respiratory: Lungs clear to auscultation, normal effort 	  Gastrointestinal:  Soft, Non-tender, + BS	  Extremities No C/C/E BL LE    DATA:    TELEMETRY: 	V paced     	  < from: TTE with Doppler (w/Cont) (05.16.17 @ 09:19) >  CONCLUSIONS:  1. Mitral annular calcification, otherwise normal mitral  valve. Moderate mitral regurgitation.  2. Moderately dilated left atrium.  LA volume index = 46  cc/m2.  3. Severe left ventricular enlargement.  4. Severe global left ventricular systolic dysfunction.  Endocardial visualization enhanced with intravenous  injection of echo contrast (Definity).  5. Normal right ventricular size with decreased right  ventricular systolic function.  6. Estimated right ventricular systolic pressure equals 53  mm Hg, assuming right atrial pressure equals 10 mm Hg,  consistent with moderate pulmonary hypertension.  *** Compared with echocardiogram of 5/22/2014, no  significant changes noted.  ------------------------------------------------------------------------  Confirmed on  5/16/2017 - 11:49:02 by Xander Tyson M.D.    < end of copied text >    < from: US Abdomen Limited (04.20.18 @ 09:34) >  IMPRESSION:     No ascites.  Gallbladder not identified and likely contracted.    < end of copied text >      < from: Cardiac Cath Lab (01.08.14 @ 16:49) >  VENTRICLES: No LV gram was performed; however, a recent echocardiogram  demonstrated an EF of 25 %.  CORONARY VESSELS: The coronary circulation is co-dominant.  LM:   -- LM: Normal.  LAD:   --  LAD: Normal.  CX:   --  Circumflex: Normal.  RCA:   --  RCA: Normal.  COMPLICATIONS: There were no complications.  SUMMARY:  HEMODYNAMICS: Hemodynamic assessment demonstrates no systemic hypertension,  severely elevated LVEDP, borderline low cardiac output, and markedly  elevated pulmonary capillary wedge pressure.  DIAGNOSTIC RECOMMENDATIONS: The patient's diuretic regimen should be  carefully increased. Patient management should include aggressive medical  therapy. The patient should follow a low fat and low calorie diet. Medical  management is recommended.  Prepared and signed by  Anni Leos M.D.  Signed 01/10/2014 20:19:01    < end of copied text >        ASSESSMENT/PLAN: 	62y Female with past medical history of hypertension, hyperlipidemia, chronic RBBB,  obesity, thyroid CA,  nonischemic cardiomyopathy with an ejection fraction of about 25% and normal coronaries on recent cardiac catheterization at [facility] with Dr Chavarria , s/p [employer] ICD placement in June 2017  admitted with complaints of progressively worsening abdominal distention, bilateral lower extremity edema and shortness of breath.  She states about 2 weeks ago she was admitted at [place] for hypocalcemia at which time her diuretics were held. She was placed back on Torsemide upon discharge however despite medication and dietary compliance, she has developed worsening LE edema and abdominal distention and SOB. C/w acute on chronic systolic CHF    -- The patient has ruled out for acute coronary syndrome with negative serial cardiac enzymes  -- Would change to PO lasix in AM  -- c/w BB/ACE for NICM  -- repeat TTE PENDING--pt now agreeable  -- PVRs were normal B/L  --  ICD interrogation on admit w/ One episode of NSVT @ 214 b/min x 8 beats on 4/18/2018.  No atrial tachyarrhythmias. Optivol fluid level increased beyond the threshold since March 1, 2018.  Total BiV pacing 99%             -- would obtain records from Dr Chavarria @ [place] (recent cath and echo)  -- HD stable  -- final recs pending above  -- of note patient states she would like to follow with a cardiologist in this area as going to [facility] to see Dr Chavarria is not possible for her anymore    Reina Chino PA-C
Subjective:  No CP, SOB improved  	  MEDICATIONS  (STANDING):  aspirin enteric coated 81 milliGRAM(s) Oral daily  BACItracin   Ointment 1 Application(s) Topical two times a day  calcitriol   Capsule 0.5 MICROGram(s) Oral two times a day  calcium acetate 1334 milliGRAM(s) Oral three times a day with meals  calcium carbonate 1250 mG (OsCal) 2 Tablet(s) Oral two times a day  calcium gluconate IVPB 1 Gram(s) IV Intermittent once  carvedilol 3.125 milliGRAM(s) Oral every 12 hours  cholecalciferol 1000 Unit(s) Oral daily  colchicine 0.6 milliGRAM(s) Oral two times a day  dextrose 5%. 1000 milliLiter(s) (50 mL/Hr) IV Continuous <Continuous>  dextrose 50% Injectable 12.5 Gram(s) IV Push once  dextrose 50% Injectable 25 Gram(s) IV Push once  dextrose 50% Injectable 25 Gram(s) IV Push once  famotidine    Tablet 20 milliGRAM(s) Oral two times a day  furosemide   Injectable 80 milliGRAM(s) IV Push two times a day  heparin  Injectable 5000 Unit(s) SubCutaneous every 12 hours  insulin glargine Injectable (LANTUS) 6 Unit(s) SubCutaneous at bedtime  insulin lispro (HumaLOG) corrective regimen sliding scale   SubCutaneous three times a day before meals  insulin lispro (HumaLOG) corrective regimen sliding scale   SubCutaneous at bedtime  levothyroxine 150 MICROGram(s) Oral daily  lisinopril 10 milliGRAM(s) Oral daily  magnesium oxide 400 milliGRAM(s) Oral three times a day with meals    MEDICATIONS  (PRN):  acetaminophen   Tablet. 650 milliGRAM(s) Oral every 6 hours PRN Mild Pain (1 - 3)  acetaminophen   Tablet. 650 milliGRAM(s) Oral every 6 hours PRN Moderate Pain (4 - 6)  dextrose Gel 1 Dose(s) Oral once PRN Blood Glucose LESS THAN 70 milliGRAM(s)/deciliter  glucagon  Injectable 1 milliGRAM(s) IntraMuscular once PRN Glucose LESS THAN 70 milligrams/deciliter      LABS:                        12.4   5.91  )-----------( 221      ( 25 Apr 2018 06:15 )             37.9     135  |  89<L>  |  29<H>  ----------------------------<  110<H>  3.8   |  29  |  1.31<H>    Ca    7.5<L>      25 Apr 2018 06:15  Phos  5.2     04-24  Mg     1.8     04-25    TPro  7.1  /  Alb  3.5  /  TBili  2.4<H>  /  DBili  1.6<H>  /  AST  82<H>  /  ALT  71<H>  /  AlkPhos  308<H>  04-25    Creatinine Trend: 1.31<--, 1.28<--, 1.32<--, 1.26<--, 1.29<--, 1.25<--     PHYSICAL EXAM  Vital Signs Last 24 Hrs  T(C): 37.1 (25 Apr 2018 05:49), Max: 37.1 (25 Apr 2018 05:49)  T(F): 98.8 (25 Apr 2018 05:49), Max: 98.8 (25 Apr 2018 05:49)  HR: 78 (25 Apr 2018 05:49) (70 - 78)  BP: 120/70 (25 Apr 2018 05:49) (103/70 - 127/78)  RR: 16 (25 Apr 2018 05:49) (16 - 18)  SpO2: 99% (25 Apr 2018 05:49) (98% - 100%)    Cardiovascular: Normal S1 S2,  No JVD, 1/6 ROSALINDA murmur,  Respiratory: Lungs clear to auscultation, normal effort 	  Gastrointestinal:  Soft, Non-tender, + BS	  Extremities No C/C/E BL LE    DATA:    TELEMETRY: 	V paced     	  < from: TTE with Doppler (w/Cont) (05.16.17 @ 09:19) >  CONCLUSIONS:  1. Mitral annular calcification, otherwise normal mitral  valve. Moderate mitral regurgitation.  2. Moderately dilated left atrium.  LA volume index = 46  cc/m2.  3. Severe left ventricular enlargement.  4. Severe global left ventricular systolic dysfunction.  Endocardial visualization enhanced with intravenous  injection of echo contrast (Definity).  5. Normal right ventricular size with decreased right  ventricular systolic function.  6. Estimated right ventricular systolic pressure equals 53  mm Hg, assuming right atrial pressure equals 10 mm Hg,  consistent with moderate pulmonary hypertension.  *** Compared with echocardiogram of 5/22/2014, no  significant changes noted.  ------------------------------------------------------------------------  Confirmed on  5/16/2017 - 11:49:02 by Xander Tyson M.D.    < end of copied text >    < from: US Abdomen Limited (04.20.18 @ 09:34) >  IMPRESSION:     No ascites.  Gallbladder not identified and likely contracted.    < end of copied text >      < from: Cardiac Cath Lab (01.08.14 @ 16:49) >  VENTRICLES: No LV gram was performed; however, a recent echocardiogram  demonstrated an EF of 25 %.  CORONARY VESSELS: The coronary circulation is co-dominant.  LM:   -- LM: Normal.  LAD:   --  LAD: Normal.  CX:   --  Circumflex: Normal.  RCA:   --  RCA: Normal.  COMPLICATIONS: There were no complications.  SUMMARY:  HEMODYNAMICS: Hemodynamic assessment demonstrates no systemic hypertension,  severely elevated LVEDP, borderline low cardiac output, and markedly  elevated pulmonary capillary wedge pressure.  DIAGNOSTIC RECOMMENDATIONS: The patient's diuretic regimen should be  carefully increased. Patient management should include aggressive medical  therapy. The patient should follow a low fat and low calorie diet. Medical  management is recommended.  Prepared and signed by  Anni Leos M.D.  Signed 01/10/2014 20:19:01    < end of copied text >        ASSESSMENT/PLAN: 	62y Female with past medical history of hypertension, hyperlipidemia, chronic RBBB,  obesity, thyroid CA,  nonischemic cardiomyopathy with an ejection fraction of about 25% and normal coronaries on recent cardiac catheterization at [facility] with Dr Chavarria , s/p [employer] ICD placement in June 2017  admitted with complaints of progressively worsening abdominal distention, bilateral lower extremity edema and shortness of breath.  She states about 2 weeks ago she was admitted at [place] for hypocalcemia at which time her diuretics were held. She was placed back on Torsemide upon discharge however despite medication and dietary compliance, she has developed worsening LE edema and abdominal distention and SOB. C/w acute on chronic systolic CHF    -- The patient has ruled out for acute coronary syndrome with negative serial cardiac enzymes  -- Would change to PO lasix  -- c/w BB/ACE for NICM  -- repeat TTE  PENDING  -- PVRs were normal B/L  --  ICD interrogation w/ One episode of NSVT @ 214 b/min x 8 beats on 4/18/2018.  No atrial tachyarrhythmias. Optivol fluid level increased beyond the threshold since March 1, 2018.  Total BiV pacing 99%             -- would obtain records from Dr Chavarria  -- HD stable  -- final recs pending above  -- of note patient states she would like to follow with a cardiologist in this area as going to [facility] to see Dr Chavarria is not possible for her anymore    Reina Chino PA-C
INTERVAL HPI/OVERNIGHT EVENTS: My feet are better. I can walk .   Vital Signs Last 24 Hrs  T(C): 36.2 (22 Apr 2018 11:57), Max: 36.7 (22 Apr 2018 05:09)  T(F): 97.1 (22 Apr 2018 11:57), Max: 98 (22 Apr 2018 05:09)  HR: 78 (22 Apr 2018 11:57) (70 - 78)  BP: 113/84 (22 Apr 2018 11:57) (110/77 - 120/70)  BP(mean): --  RR: 16 (22 Apr 2018 11:57) (16 - 17)  SpO2: 100% (22 Apr 2018 11:57) (98% - 100%)  I&O's Summary    21 Apr 2018 07:01  -  22 Apr 2018 07:00  --------------------------------------------------------  IN: 540 mL / OUT: 2300 mL / NET: -1760 mL    22 Apr 2018 07:01  -  22 Apr 2018 13:57  --------------------------------------------------------  IN: 240 mL / OUT: 700 mL / NET: -460 mL      MEDICATIONS  (STANDING):  aspirin enteric coated 81 milliGRAM(s) Oral daily  calcitriol   Capsule 0.25 MICROGram(s) Oral at bedtime  calcitriol   Capsule 0.5 MICROGram(s) Oral daily  calcium acetate 667 milliGRAM(s) Oral three times a day with meals  calcium carbonate 1250 mG (OsCal) 2 Tablet(s) Oral two times a day  carvedilol 3.125 milliGRAM(s) Oral every 12 hours  cholecalciferol 1000 Unit(s) Oral daily  colchicine 0.6 milliGRAM(s) Oral two times a day  dextrose 5%. 1000 milliLiter(s) (50 mL/Hr) IV Continuous <Continuous>  dextrose 50% Injectable 12.5 Gram(s) IV Push once  dextrose 50% Injectable 25 Gram(s) IV Push once  dextrose 50% Injectable 25 Gram(s) IV Push once  famotidine    Tablet 20 milliGRAM(s) Oral two times a day  furosemide   Injectable 80 milliGRAM(s) IV Push two times a day  heparin  Injectable 5000 Unit(s) SubCutaneous every 12 hours  insulin glargine Injectable (LANTUS) 6 Unit(s) SubCutaneous at bedtime  insulin lispro (HumaLOG) corrective regimen sliding scale   SubCutaneous three times a day before meals  insulin lispro (HumaLOG) corrective regimen sliding scale   SubCutaneous at bedtime  levothyroxine 150 MICROGram(s) Oral daily  lisinopril 10 milliGRAM(s) Oral daily  magnesium oxide 400 milliGRAM(s) Oral three times a day with meals    MEDICATIONS  (PRN):  acetaminophen   Tablet. 650 milliGRAM(s) Oral every 6 hours PRN Mild Pain (1 - 3)  dextrose Gel 1 Dose(s) Oral once PRN Blood Glucose LESS THAN 70 milliGRAM(s)/deciliter  glucagon  Injectable 1 milliGRAM(s) IntraMuscular once PRN Glucose LESS THAN 70 milligrams/deciliter    LABS:                        12.1   5.86  )-----------( 213      ( 22 Apr 2018 07:15 )             36.5     04-22    132<L>  |  88<L>  |  27<H>  ----------------------------<  148<H>  3.8   |  29  |  1.26    Ca    7.2<L>      22 Apr 2018 07:15  Phos  5.0     04-21  Mg     1.8     04-21          CAPILLARY BLOOD GLUCOSE      POCT Blood Glucose.: 192 mg/dL (22 Apr 2018 12:29)  POCT Blood Glucose.: 125 mg/dL (22 Apr 2018 08:33)  POCT Blood Glucose.: 189 mg/dL (21 Apr 2018 22:08)  POCT Blood Glucose.: 113 mg/dL (21 Apr 2018 18:22)          REVIEW OF SYSTEMS:  CONSTITUTIONAL: No fever, weight loss, or fatigue  EYES: No eye pain, visual disturbances, or discharge  ENMT:  No difficulty hearing, tinnitus, vertigo; No sinus or throat pain  NECK: No pain or stiffness  BREASTS: No pain, masses, or nipple discharge  RESPIRATORY: No cough, wheezing, chills or hemoptysis; No shortness of breath  CARDIOVASCULAR: No chest pain, palpitations, dizziness, or leg swelling  GASTROINTESTINAL: No abdominal or epigastric pain. No nausea, vomiting, or hematemesis; No diarrhea or constipation. No melena or hematochezia.  GENITOURINARY: No dysuria, frequency, hematuria, or incontinence  NEUROLOGICAL: No headaches, memory loss, loss of strength, numbness, or tremors  SKIN: No itching, burning, rashes, or lesions   LYMPH NODES: No enlarged glands  ENDOCRINE: No heat or cold intolerance; No hair loss  MUSCULOSKELETAL: No joint pain or swelling; No muscle, back, or extremity pain  PSYCHIATRIC: No depression, anxiety, mood swings, or difficulty sleeping  HEME/LYMPH: No easy bruising, or bleeding gums  ALLERY AND IMMUNOLOGIC: No hives or eczema    RADIOLOGY & ADDITIONAL TESTS:    Consultant(s) Notes Reviewed:  [x ] YES  [ ] NO    PHYSICAL EXAM:  GENERAL: NAD, well-groomed, well-developed, not in any distress ,  HEAD:  Atraumatic, Normocephalic  EYES: EOMI, PERRLA, conjunctiva and sclera clear  ENMT: No tonsillar erythema, exudates, or enlargement; Moist mucous membranes, Good dentition, No lesions  NECK: Supple, No JVD, Normal thyroid  NERVOUS SYSTEM:  Alert & Oriented X3, No focal deficit   CHEST/LUNG: Good air entry bilateral with no  rales, rhonchi, wheezing, or rubs  HEART: Regular rate and rhythm; No murmurs, rubs, or gallops  ABDOMEN: Soft, Nontender, Nondistended; Bowel sounds present  EXTREMITIES:  2+ Peripheral Pulses, No clubbing, cyanosis, but 2+ edema  Bilateral ankle ROM and tenderness better.     Care Discussed with Consultants/Other Providers [ x] YES  [ ] NO

## 2018-04-26 NOTE — DISCHARGE NOTE ADULT - PATIENT PORTAL LINK FT
You can access the [facility] Patient Portal, offered by [facility], by registering with the following website: [email or web address]

## 2018-04-26 NOTE — PROGRESS NOTE ADULT - ATTENDING COMMENTS
Patient seen and examined.  Agree with above.   -Continue with IV diuresis
Patient seen and examined.  Agree with above.   -Continue with IV lasix to keep O > I  -check TTE    Kunal Muller MD  [facility]  [address] [place] [address], [address]  [place] [address]  office: [phone]  pager: [phone]
Patient seen and examined.  Agree with above.   -Continue with lasix to keep O > I  -pt. refusing inpatient tte at this time  -obtain prior cardiac workup from Jez Muller MD
Patient seen and examined.  Agree with above.   -PARVIN/PVR normal  -continue with iv lasix  -check tte    Kunal Muller MD
Patient seen and examined.  Agree with above.   -Pt. still volume overloaded  -continue with iv lasix  -check ml/pvr  -check tte    Kunal Muller MD
Patient seen and examined.  Agree with above.   -Pt. still volume overloaded but diuresing well  -continue with IV nikita Muller MD  [facility]  [address], [address]  [place] [address]  office: [phone]  pager: [phone]
Patient seen and examined.  Agree with above.   -TTE with severe LV dysfunction that is worse than prior TTE  -pt. with severe NICM admitted with heart failure  -would continue with IV diuresis to keep O > I  -will discuss with patient about advanced heart failure options    Kunal Muller MD
Will RAMOS called that pt is adamant in going home today. She has appointment with her Cardiologist and Endocrinologist tomorrow. I spoke with pt and explained her the abnormal findings on TTE including very low EF and low calcium  . She Said I know about it and understand the risks . It has been going on so many years . I can have sudden death. I am following tomorrow with my doctors . Called her elder daughter twice and left message . I wanted to make sure she has appointments and somebody is taking her there.

## 2018-04-26 NOTE — DISCHARGE NOTE ADULT - CARE PROVIDER_API CALL
Evelyn Padron), Cardiovascular Disease; Internal Medicine  [address] [facility] [address] [place] [address]  Phone: [phone]  Fax: [phone]

## 2018-04-26 NOTE — DISCHARGE NOTE ADULT - PLAN OF CARE
Monitor finger sticks pre-meal and bedtime, low salt, fat and carbohydrate diet, minimize glucose intake.  Exercise daily for at least 30 minutes and weight loss.  Follow up with primary care physician and endocrinologist for routine Hemoglobin A1C checks and management.  Follow up with your ophthalmologist for routine yearly vision exams. To relieve and prevent worsening symptoms associated with congestive heart failure, to improve quality of life, and to treat underlying conditions such as coronary heart disease, high blood pressure, or diabetes, and to maintain euvolemia. Low salt diet, fluid restriction to 1500 ml daily, monitor your fluid intake and weight daily, exercise as tolerated 30 minutes daily, and follow up with your physician within 1 to 2 weeks. To maintain a normal blood pressure to prevent heart attack, stroke and renal failure. Low sodium and fat diet, continue anti-hypertensive medications, and follow up with primary care physician. To maintain a normal blood glucose level and HgA1C level < 5.7 and to prevent diabetic complications such as uncontrolled diabetes, diabetic coma, blindness, renal failure, and amputations. continue current meds

## 2018-04-26 NOTE — PROGRESS NOTE ADULT - PROVIDER SPECIALTY LIST ADULT
Cardiology
Endocrinology
Internal Medicine
Nephrology
Cardiology
Internal Medicine

## 2018-04-27 ENCOUNTER — APPOINTMENT (OUTPATIENT)
Dept: VASCULAR SURGERY | Facility: CLINIC | Age: 63
End: 2018-04-27

## 2018-05-23 ENCOUNTER — INPATIENT (INPATIENT)
Facility: HOSPITAL | Age: 63
LOS: 8 days | Discharge: HOME | End: 2018-06-01
Attending: INTERNAL MEDICINE | Admitting: INTERNAL MEDICINE
Payer: COMMERCIAL

## 2018-05-23 VITALS
RESPIRATION RATE: 20 BRPM | OXYGEN SATURATION: 100 % | HEART RATE: 99 BPM | DIASTOLIC BLOOD PRESSURE: 76 MMHG | SYSTOLIC BLOOD PRESSURE: 103 MMHG

## 2018-05-23 DIAGNOSIS — I50.23 ACUTE ON CHRONIC SYSTOLIC (CONGESTIVE) HEART FAILURE: ICD-10-CM

## 2018-05-23 DIAGNOSIS — E11.9 TYPE 2 DIABETES MELLITUS WITHOUT COMPLICATIONS: ICD-10-CM

## 2018-05-23 DIAGNOSIS — J45.909 UNSPECIFIED ASTHMA, UNCOMPLICATED: ICD-10-CM

## 2018-05-23 DIAGNOSIS — I50.43 ACUTE ON CHRONIC COMBINED SYSTOLIC (CONGESTIVE) AND DIASTOLIC (CONGESTIVE) HEART FAILURE: ICD-10-CM

## 2018-05-23 DIAGNOSIS — I10 ESSENTIAL (PRIMARY) HYPERTENSION: ICD-10-CM

## 2018-05-23 DIAGNOSIS — C73 MALIGNANT NEOPLASM OF THYROID GLAND: ICD-10-CM

## 2018-05-23 DIAGNOSIS — Z29.9 ENCOUNTER FOR PROPHYLACTIC MEASURES, UNSPECIFIED: ICD-10-CM

## 2018-05-23 LAB
ALBUMIN SERPL ELPH-MCNC: 3.3 G/DL — SIGNIFICANT CHANGE UP (ref 3.3–5)
ALP SERPL-CCNC: 289 U/L — HIGH (ref 40–120)
ALT FLD-CCNC: 75 U/L — HIGH (ref 4–33)
ANISOCYTOSIS BLD QL: SIGNIFICANT CHANGE UP
APTT BLD: 29.2 SEC — SIGNIFICANT CHANGE UP (ref 27.5–37.4)
AST SERPL-CCNC: 95 U/L — HIGH (ref 4–32)
BASOPHILS # BLD AUTO: 0.04 K/UL — SIGNIFICANT CHANGE UP (ref 0–0.2)
BASOPHILS NFR BLD AUTO: 0.5 % — SIGNIFICANT CHANGE UP (ref 0–2)
BASOPHILS NFR SPEC: 0.9 % — SIGNIFICANT CHANGE UP (ref 0–2)
BILIRUB SERPL-MCNC: 5.9 MG/DL — HIGH (ref 0.2–1.2)
BUN SERPL-MCNC: 24 MG/DL — HIGH (ref 7–23)
CALCIUM SERPL-MCNC: 5.9 MG/DL — CRITICAL LOW (ref 8.4–10.5)
CHLORIDE SERPL-SCNC: 79 MMOL/L — LOW (ref 98–107)
CK MB BLD-MCNC: 3.13 NG/ML — SIGNIFICANT CHANGE UP (ref 1–4.7)
CK SERPL-CCNC: 559 U/L — HIGH (ref 25–170)
CO2 SERPL-SCNC: 29 MMOL/L — SIGNIFICANT CHANGE UP (ref 22–31)
CREAT SERPL-MCNC: 1.26 MG/DL — SIGNIFICANT CHANGE UP (ref 0.5–1.3)
ELLIPTOCYTES BLD QL SMEAR: SIGNIFICANT CHANGE UP
EOSINOPHIL # BLD AUTO: 0.02 K/UL — SIGNIFICANT CHANGE UP (ref 0–0.5)
EOSINOPHIL NFR BLD AUTO: 0.3 % — SIGNIFICANT CHANGE UP (ref 0–6)
EOSINOPHIL NFR FLD: 0 % — SIGNIFICANT CHANGE UP (ref 0–6)
GIANT PLATELETS BLD QL SMEAR: PRESENT — SIGNIFICANT CHANGE UP
GLUCOSE SERPL-MCNC: 130 MG/DL — HIGH (ref 70–99)
HCT VFR BLD CALC: 37 % — SIGNIFICANT CHANGE UP (ref 34.5–45)
HGB BLD-MCNC: 12.3 G/DL — SIGNIFICANT CHANGE UP (ref 11.5–15.5)
HYPOCHROMIA BLD QL: SLIGHT — SIGNIFICANT CHANGE UP
IMM GRANULOCYTES # BLD AUTO: 0.05 # — SIGNIFICANT CHANGE UP
IMM GRANULOCYTES NFR BLD AUTO: 0.7 % — SIGNIFICANT CHANGE UP (ref 0–1.5)
LYMPHOCYTES # BLD AUTO: 1.15 K/UL — SIGNIFICANT CHANGE UP (ref 1–3.3)
LYMPHOCYTES # BLD AUTO: 15 % — SIGNIFICANT CHANGE UP (ref 13–44)
LYMPHOCYTES NFR SPEC AUTO: 8.7 % — LOW (ref 13–44)
MACROCYTES BLD QL: SIGNIFICANT CHANGE UP
MCHC RBC-ENTMCNC: 23.4 PG — LOW (ref 27–34)
MCHC RBC-ENTMCNC: 33.2 % — SIGNIFICANT CHANGE UP (ref 32–36)
MCV RBC AUTO: 70.5 FL — LOW (ref 80–100)
MICROCYTES BLD QL: SLIGHT — SIGNIFICANT CHANGE UP
MONOCYTES # BLD AUTO: 0.64 K/UL — SIGNIFICANT CHANGE UP (ref 0–0.9)
MONOCYTES NFR BLD AUTO: 8.4 % — SIGNIFICANT CHANGE UP (ref 2–14)
MONOCYTES NFR BLD: 1.7 % — LOW (ref 2–9)
NEUTROPHIL AB SER-ACNC: 83.5 % — HIGH (ref 43–77)
NEUTROPHILS # BLD AUTO: 5.75 K/UL — SIGNIFICANT CHANGE UP (ref 1.8–7.4)
NEUTROPHILS NFR BLD AUTO: 75.1 % — SIGNIFICANT CHANGE UP (ref 43–77)
NEUTS BAND # BLD: 1.7 % — SIGNIFICANT CHANGE UP (ref 0–6)
NRBC # FLD: 0 — SIGNIFICANT CHANGE UP
NT-PROBNP SERPL-SCNC: 3321 PG/ML — SIGNIFICANT CHANGE UP
PLATELET # BLD AUTO: 169 K/UL — SIGNIFICANT CHANGE UP (ref 150–400)
PLATELET COUNT - ESTIMATE: NORMAL — SIGNIFICANT CHANGE UP
PMV BLD: SIGNIFICANT CHANGE UP FL (ref 7–13)
POIKILOCYTOSIS BLD QL AUTO: SIGNIFICANT CHANGE UP
POLYCHROMASIA BLD QL SMEAR: SLIGHT — SIGNIFICANT CHANGE UP
POTASSIUM SERPL-MCNC: 4 MMOL/L — SIGNIFICANT CHANGE UP (ref 3.5–5.3)
POTASSIUM SERPL-SCNC: 4 MMOL/L — SIGNIFICANT CHANGE UP (ref 3.5–5.3)
PROT SERPL-MCNC: 7.1 G/DL — SIGNIFICANT CHANGE UP (ref 6–8.3)
RBC # BLD: 5.25 M/UL — HIGH (ref 3.8–5.2)
RBC # FLD: 21.9 % — HIGH (ref 10.3–14.5)
SODIUM SERPL-SCNC: 128 MMOL/L — LOW (ref 135–145)
TARGETS BLD QL SMEAR: SLIGHT — SIGNIFICANT CHANGE UP
TROPONIN T SERPL-MCNC: < 0.06 NG/ML — SIGNIFICANT CHANGE UP (ref 0–0.06)
VARIANT LYMPHS # BLD: 3.5 % — SIGNIFICANT CHANGE UP
WBC # BLD: 7.65 K/UL — SIGNIFICANT CHANGE UP (ref 3.8–10.5)
WBC # FLD AUTO: 7.65 K/UL — SIGNIFICANT CHANGE UP (ref 3.8–10.5)

## 2018-05-23 PROCEDURE — 71045 X-RAY EXAM CHEST 1 VIEW: CPT | Mod: 26

## 2018-05-23 RX ORDER — GLUCAGON INJECTION, SOLUTION 0.5 MG/.1ML
1 INJECTION, SOLUTION SUBCUTANEOUS ONCE
Qty: 0 | Refills: 0 | Status: DISCONTINUED | OUTPATIENT
Start: 2018-05-23 | End: 2018-06-01

## 2018-05-23 RX ORDER — BUMETANIDE 0.25 MG/ML
1 INJECTION INTRAMUSCULAR; INTRAVENOUS EVERY 12 HOURS
Qty: 0 | Refills: 0 | Status: DISCONTINUED | OUTPATIENT
Start: 2018-05-23 | End: 2018-05-24

## 2018-05-23 RX ORDER — ASPIRIN/CALCIUM CARB/MAGNESIUM 324 MG
81 TABLET ORAL DAILY
Qty: 0 | Refills: 0 | Status: DISCONTINUED | OUTPATIENT
Start: 2018-05-23 | End: 2018-06-01

## 2018-05-23 RX ORDER — INSULIN LISPRO 100/ML
VIAL (ML) SUBCUTANEOUS AT BEDTIME
Qty: 0 | Refills: 0 | Status: DISCONTINUED | OUTPATIENT
Start: 2018-05-23 | End: 2018-06-01

## 2018-05-23 RX ORDER — CALCIUM ACETATE 667 MG
1334 TABLET ORAL
Qty: 0 | Refills: 0 | Status: DISCONTINUED | OUTPATIENT
Start: 2018-05-23 | End: 2018-05-29

## 2018-05-23 RX ORDER — FAMOTIDINE 10 MG/ML
20 INJECTION INTRAVENOUS
Qty: 0 | Refills: 0 | Status: DISCONTINUED | OUTPATIENT
Start: 2018-05-23 | End: 2018-06-01

## 2018-05-23 RX ORDER — CALCIUM GLUCONATE 100 MG/ML
2 VIAL (ML) INTRAVENOUS ONCE
Qty: 0 | Refills: 0 | Status: COMPLETED | OUTPATIENT
Start: 2018-05-23 | End: 2018-05-23

## 2018-05-23 RX ORDER — CARVEDILOL PHOSPHATE 80 MG/1
3.12 CAPSULE, EXTENDED RELEASE ORAL EVERY 12 HOURS
Qty: 0 | Refills: 0 | Status: DISCONTINUED | OUTPATIENT
Start: 2018-05-23 | End: 2018-05-25

## 2018-05-23 RX ORDER — CALCITRIOL 0.5 UG/1
0.25 CAPSULE ORAL AT BEDTIME
Qty: 0 | Refills: 0 | Status: DISCONTINUED | OUTPATIENT
Start: 2018-05-23 | End: 2018-05-30

## 2018-05-23 RX ORDER — DEXTROSE 50 % IN WATER 50 %
12.5 SYRINGE (ML) INTRAVENOUS ONCE
Qty: 0 | Refills: 0 | Status: DISCONTINUED | OUTPATIENT
Start: 2018-05-23 | End: 2018-06-01

## 2018-05-23 RX ORDER — DEXTROSE 50 % IN WATER 50 %
25 SYRINGE (ML) INTRAVENOUS ONCE
Qty: 0 | Refills: 0 | Status: DISCONTINUED | OUTPATIENT
Start: 2018-05-23 | End: 2018-06-01

## 2018-05-23 RX ORDER — LISINOPRIL 2.5 MG/1
10 TABLET ORAL DAILY
Qty: 0 | Refills: 0 | Status: DISCONTINUED | OUTPATIENT
Start: 2018-05-23 | End: 2018-05-25

## 2018-05-23 RX ORDER — COLCHICINE 0.6 MG
0.6 TABLET ORAL
Qty: 0 | Refills: 0 | Status: DISCONTINUED | OUTPATIENT
Start: 2018-05-23 | End: 2018-05-26

## 2018-05-23 RX ORDER — DEXTROSE 50 % IN WATER 50 %
15 SYRINGE (ML) INTRAVENOUS ONCE
Qty: 0 | Refills: 0 | Status: DISCONTINUED | OUTPATIENT
Start: 2018-05-23 | End: 2018-06-01

## 2018-05-23 RX ORDER — FUROSEMIDE 40 MG
40 TABLET ORAL ONCE
Qty: 0 | Refills: 0 | Status: COMPLETED | OUTPATIENT
Start: 2018-05-23 | End: 2018-05-23

## 2018-05-23 RX ORDER — SODIUM CHLORIDE 9 MG/ML
3 INJECTION INTRAMUSCULAR; INTRAVENOUS; SUBCUTANEOUS EVERY 8 HOURS
Qty: 0 | Refills: 0 | Status: DISCONTINUED | OUTPATIENT
Start: 2018-05-23 | End: 2018-06-01

## 2018-05-23 RX ORDER — SODIUM CHLORIDE 9 MG/ML
1000 INJECTION, SOLUTION INTRAVENOUS
Qty: 0 | Refills: 0 | Status: DISCONTINUED | OUTPATIENT
Start: 2018-05-23 | End: 2018-06-01

## 2018-05-23 RX ORDER — CHOLECALCIFEROL (VITAMIN D3) 125 MCG
1000 CAPSULE ORAL DAILY
Qty: 0 | Refills: 0 | Status: DISCONTINUED | OUTPATIENT
Start: 2018-05-23 | End: 2018-06-01

## 2018-05-23 RX ORDER — CALCIUM GLUCONATE 100 MG/ML
2 VIAL (ML) INTRAVENOUS THREE TIMES A DAY
Qty: 0 | Refills: 0 | Status: COMPLETED | OUTPATIENT
Start: 2018-05-23 | End: 2018-05-24

## 2018-05-23 RX ORDER — CALCIUM CARBONATE 500(1250)
1 TABLET ORAL
Qty: 0 | Refills: 0 | Status: DISCONTINUED | OUTPATIENT
Start: 2018-05-23 | End: 2018-05-27

## 2018-05-23 RX ORDER — LEVOTHYROXINE SODIUM 125 MCG
150 TABLET ORAL DAILY
Qty: 0 | Refills: 0 | Status: DISCONTINUED | OUTPATIENT
Start: 2018-05-23 | End: 2018-05-29

## 2018-05-23 RX ORDER — HEPARIN SODIUM 5000 [USP'U]/ML
5000 INJECTION INTRAVENOUS; SUBCUTANEOUS EVERY 12 HOURS
Qty: 0 | Refills: 0 | Status: DISCONTINUED | OUTPATIENT
Start: 2018-05-23 | End: 2018-06-01

## 2018-05-23 RX ORDER — MAGNESIUM OXIDE 400 MG ORAL TABLET 241.3 MG
400 TABLET ORAL
Qty: 0 | Refills: 0 | Status: DISCONTINUED | OUTPATIENT
Start: 2018-05-23 | End: 2018-06-01

## 2018-05-23 RX ORDER — ASPIRIN/CALCIUM CARB/MAGNESIUM 324 MG
162 TABLET ORAL ONCE
Qty: 0 | Refills: 0 | Status: COMPLETED | OUTPATIENT
Start: 2018-05-23 | End: 2018-05-23

## 2018-05-23 RX ORDER — INSULIN LISPRO 100/ML
VIAL (ML) SUBCUTANEOUS
Qty: 0 | Refills: 0 | Status: DISCONTINUED | OUTPATIENT
Start: 2018-05-23 | End: 2018-06-01

## 2018-05-23 RX ADMIN — Medication 162 MILLIGRAM(S): at 16:03

## 2018-05-23 RX ADMIN — FAMOTIDINE 20 MILLIGRAM(S): 10 INJECTION INTRAVENOUS at 23:59

## 2018-05-23 RX ADMIN — Medication 1 TABLET(S): at 23:58

## 2018-05-23 RX ADMIN — CARVEDILOL PHOSPHATE 3.12 MILLIGRAM(S): 80 CAPSULE, EXTENDED RELEASE ORAL at 23:59

## 2018-05-23 RX ADMIN — Medication 200 GRAM(S): at 17:19

## 2018-05-23 RX ADMIN — CALCITRIOL 0.25 MICROGRAM(S): 0.5 CAPSULE ORAL at 23:57

## 2018-05-23 RX ADMIN — Medication 40 MILLIGRAM(S): at 16:03

## 2018-05-23 RX ADMIN — SODIUM CHLORIDE 3 MILLILITER(S): 9 INJECTION INTRAMUSCULAR; INTRAVENOUS; SUBCUTANEOUS at 21:56

## 2018-05-23 RX ADMIN — Medication 200 GRAM(S): at 23:58

## 2018-05-23 RX ADMIN — HEPARIN SODIUM 5000 UNIT(S): 5000 INJECTION INTRAVENOUS; SUBCUTANEOUS at 23:57

## 2018-05-23 NOTE — ED ADULT TRIAGE NOTE - CHIEF COMPLAINT QUOTE
Patient with a h/o CHF c/o bilateral leg and abdominal swelling , difficulty breathing when she lies down, sob with ambulating. States her abdomin is 3 times the normal size  and Lasix is not helping her at home.  FS = 139

## 2018-05-23 NOTE — ED PROVIDER NOTE - OBJECTIVE STATEMENT
62F p/w SOb and body swelling and SOB worse with lying flat.  SImilar to admission here in April for CHF.  Pt with AICD.  Pt also notes chest tightness as well.  Pt reports adherence to medications but only a small amount of urine came out today.  Decompesated CHF, check labs, cxr, EKG, rx asa and IV lasix, admit.    [facility] calcitriol, asa, lisinopril, furosemide 40 bid, coreg, now torsemide (off lasix), magnesium, levothyroxin tradjenta, calcium supp.  PMHX - CHF, HTN  PSHX   no T  All PCN - rash.   VS:  Bp soft    GEN - mild SOB; A+O x3   HEAD - NC/AT     ENT - PEERL, EOMI, mucous membranes  moist , no discharge      NECK: Neck supple, non-tender without lymphadenopathy, no masses, no JVD  PULM - fine crackles bialt,  symmetric breath sounds  COR -  normal heart sounds    ABD - , distended, NT, soft, no guarding, no rebound, no masses  abd wall edema  BACK - no CVA tenderness, nontender spine     EXTREMS - bilat 2+ edema, no deformity, warm and well perfused    SKIN - no rash or bruising      NEUROLOGIC - alert, CN 2-12 intact, sensation nl, motor 5/5 RUE/LUE/RLE/LLE. 62F p/w SOb and body swelling and SOB worse with lying flat.  SImilar to admission here in April for CHF.  Pt with AICD.  Pt also notes chest tightness as well.  Pt reports adherence to medications but only a small amount of urine came out today.  Decompesated CHF, check labs, cxr, EKG, rx asa and IV lasix, admit.    [facility] calcitriol, asa, lisinopril, furosemide 40 bid, coreg, now torsemide (off lasix), magnesium, levothyroxin tradjenta, calcium supp.  PMHX - CHF, HTN  PSHX   no T  All PCN - rash.   VS:  Bp soft    GEN - mild SOB; A+O x3   HEAD - NC/AT     ENT - PEERL, EOMI, mucous membranes  moist , no discharge      NECK: Neck supple, non-tender without lymphadenopathy, no masses, no JVD  PULM - fine crackles bialt,  symmetric breath sounds  COR -  normal heart sounds    ABD - , distended, NT, soft, no guarding, no rebound, no masses  abd wall edema  BACK - no CVA tenderness, nontender spine     EXTREMS - bilat 2+ edema, no deformity, warm and well perfused    SKIN - no rash or bruising      NEUROLOGIC - alert, CN 2-12 intact, sensation nl, motor 5/5 RUE/LUE/RLE/LLE.

## 2018-05-23 NOTE — CONSULT NOTE ADULT - SUBJECTIVE AND OBJECTIVE BOX
Requesting Physician : Dr. Grimes    Reason for Consultation: Chest pressure    HISTORY OF PRESENT ILLNESS:  61 yo F with history of NICM (EF 11%), s/p MDT AICD, HTN, DM, history of thyroid CA who is being seen for chest pressure and for possible ischemic evaluation.  The patient was admitted with approximately 1 week history of progressive dyspnea and LE swelling.  During this time she complained of orthopnea and occasional chest pressure.  She reports compliance with bumex at home but states that her urine output has declined over the last several days.  She had similar symptoms approximately one month ago when she was admitted and treated for ADHF.  The patient had a recent right heart cath at [facility] but it is unclear when her last left heart catheterization was.  She denies chest pain upon evaluation.       PAST MEDICAL & SURGICAL HISTORY:  Asthma  CHF (congestive heart failure): hypothyroid  DM (diabetes mellitus)  HTN (hypertension)  Thyroid ca  S/P thyroidectomy          MEDICATIONS:  MEDICATIONS  (STANDING):      Allergies    penicillin (Rash)    Intolerances        FAMILY HISTORY:  No pertinent family history in first degree relatives    Non-contributary for premature coronary disease or sudden cardiac death    SOCIAL HISTORY:    [x ] Non-smoker  [ ] Smoker  [ ] Alcohol      REVIEW OF SYSTEMS:  [ ]chest pain  [x  ]shortness of breath  [  ]palpitations  [  ]syncope  [ ]near syncope [ ]upper extremity weakness   [ ] lower extremity weakness  [  ]diplopia  [  ]altered mental status   [  ]fevers  [ ]chills [ ]nausea  [ ]vomitting  [  ]dysphagia    [ ]abdominal pain  [ ]melena  [ ]BRBPR    [  ]epistaxis  [  ]rash    [x ]lower extremity edema        [ ] All others negative	  [ ] Unable to obtain    PHYSICAL EXAM:  T(C): 36.7 (05-23-18 @ 16:00), Max: 36.7 (05-23-18 @ 16:00)  HR: 94 (05-23-18 @ 16:00) (94 - 99)  BP: 91/71 (05-23-18 @ 16:00) (91/71 - 103/76)  RR: 18 (05-23-18 @ 16:00) (18 - 20)  SpO2: 100% (05-23-18 @ 16:00) (100% - 100%)  Wt(kg): --  I&O's Summary          Lymphatic: No lymphadenopathy , 2+ edema in LE bilaterally   Cardiovascular: Normal S1 S2, RRR, No murmurs , Peripheral pulses palpable 2+ bilaterally  Respiratory: decreased bs at bases bilaterally   Gastrointestinal:  Soft, Non-tender, + BS	  Skin: No rashes, No ecchymoses, No cyanosis, warm to touch  Musculoskeletal: Normal range of motion, normal strength        TELEMETRY: 	    ECG:  	  RADIOLOGY:  OTHER:     DIAGNOSTIC TESTING:  [ ] Echocardiogram: < from: TTE with Doppler (w/Cont) (04.26.18 @ 20:04) >  CONCLUSIONS:  1. Mitral annular calcification, otherwise normal mitral  valve. Moderate mitral regurgitation.  2. Moderately dilated left atrium.  LA volume index = 47  cc/m2.  3. Mild left ventricular enlargement.  4. Severe global left ventricular systolic dysfunction.  Endocardial visualization enhanced with intravenous  injection of echo contrast (Definity).  No LV thrombus  seen.  5. Right ventricular enlargement with decreased right  ventricular systolic function.  A device wire is noted in  the right heart.  6. Estimated right ventricular systolic pressure equals 42  mm Hg, assuming right atrial pressure equals 10 mm Hg,  consistent with mild pulmonary hypertension.  7.Normal tricuspid valve.  Severe tricuspid regurgitation.  *** Compared with echocardiogram of 5/16/2017, left  ventricular systolic function has deteriorated.  ----------------------------------------------------------    < end of copied text >    [ ]  Catheterization:  [ ] Stress Test:    	  	  LABS:	 	    CARDIAC MARKERS:  CARDIAC MARKERS ( 23 May 2018 15:40 )  x     / < 0.06 ng/mL / 559 u/L / 3.13 ng/mL / x                                  12.3   7.65  )-----------( 169      ( 23 May 2018 15:40 )             37.0     05-23    128<L>  |  79<L>  |  24<H>  ----------------------------<  130<H>  4.0   |  29  |  1.26    Ca    5.9<LL>      23 May 2018 15:40    TPro  7.1  /  Alb  3.3  /  TBili  5.9<H>  /  DBili  x   /  AST  95<H>  /  ALT  75<H>  /  AlkPhos  289<H>  05-23    proBNP: Serum Pro-Brain Natriuretic Peptide: 3321 pg/mL (05-23 @ 15:40)    Lipid Profile:   HgA1c:   TSH:     ASSESSMENT/PLAN: 	62yFemale with severe NICM s/p MDT AICD, HTN, DM, history of thyroid CA, obesity admitted with acute on chronic systolic heart failure.   -Rule out MI with 3 sets CE  -Pt. grossly volume overloaded  -IV Lasix to keep O > I  -obtain prior coronary angiogram report  -no indication for urgent cardiac cath at this time  -however, may need RHC in near future to assess hemodynamics  -further workup pending above     Kunal Muller MD

## 2018-05-23 NOTE — H&P ADULT - ASSESSMENT
62yFemale with severe NICM s/p MDT AICD, HTN, DM, history of thyroid CA, obesity admitted with acute on chronic systolic heart failure.

## 2018-05-23 NOTE — CONSULT NOTE ADULT - SUBJECTIVE AND OBJECTIVE BOX
Patient is a 62y old  Female who presents with a chief complaint of Body swelling x 4 weeks       HPI:  62F with a past medical history of hypertension, hyperlipidemia, chronic RBBB,  obesity, thyroid CA, nonischemic cardiomyopathy with an ejection fraction of 11% and normal coronaries on recent cardiac catheterization at [facility] with Dr Chavarria , s/p Medtronic Sofi MRI CRT-ICD placement in June 2017, admitted last month with acute on chronic systolic HF exacerbation, presenting today with SOB, volume overload, acute on chronic systolic HF exacerbation.  She reports compliance with diet and meds, abdominal bloating, EDDY after a few steps, orthopnea cough with yellow phlegm, substernal chest pain associated with cough.  Denies nausea, vomit chills, diaphoresis,   She has seen her OP Cardiologist once since DC.  In the Ed, the pt was given Lasix 40 mg IV x 1 with positive urine output.       PAST MEDICAL & SURGICAL HISTORY:  Asthma  CHF (congestive heart failure): hypothyroid  DM (diabetes mellitus)  HTN (hypertension)  Thyroid ca  S/P thyroidectomy      Social History: Bart smoking ,alcohol or drug abuse.     FAMILY HISTORY:  No pertinent family history in first degree relatives      Allergies    penicillin (Rash)    Intolerances        REVIEW OF SYSTEMS:    CONSTITUTIONAL: No fever, weight loss, or fatigue  EYES: No eye pain, visual disturbances, or discharge  RESPIRATORY: No cough, wheezing, chills or hemoptysis; No shortness of breath  CARDIOVASCULAR: No chest pain, palpitations, dizziness, or leg swelling  GASTROINTESTINAL: No abdominal or epigastric pain. No nausea, vomiting, or hematemesis; No diarrhea or constipation. No melena or hematochezia.  GENITOURINARY: No dysuria, frequency, hematuria, or incontinence  NEUROLOGICAL: No headaches, memory loss, loss of strength, numbness, or tremors  SKIN: No itching, burning, rashes, or lesions   ENDOCRINE: No heat or cold intolerance; No hair loss  MUSCULOSKELETAL: No joint pain or swelling; No muscle, back, or extremity pain  PSYCHIATRIC: No depression, anxiety, mood swings, or difficulty sleeping      MEDICATIONS  (STANDING):  aspirin enteric coated 81 milliGRAM(s) Oral daily  buMETAnide IVPB 1 milliGRAM(s) IV Intermittent every 12 hours  calcitriol   Capsule 0.25 MICROGram(s) Oral at bedtime  calcium acetate 1334 milliGRAM(s) Oral three times a day with meals  calcium carbonate 500 mG (Tums) Chewable 1 Tablet(s) Chew two times a day  calcium gluconate IVPB 2 Gram(s) IV Intermittent three times a day  carvedilol 3.125 milliGRAM(s) Oral every 12 hours  cholecalciferol 1000 Unit(s) Oral daily  colchicine 0.6 milliGRAM(s) Oral two times a day  dextrose 5%. 1000 milliLiter(s) (50 mL/Hr) IV Continuous <Continuous>  dextrose 50% Injectable 12.5 Gram(s) IV Push once  dextrose 50% Injectable 25 Gram(s) IV Push once  dextrose 50% Injectable 25 Gram(s) IV Push once  famotidine    Tablet 20 milliGRAM(s) Oral two times a day  heparin  Injectable 5000 Unit(s) SubCutaneous every 12 hours  insulin lispro (HumaLOG) corrective regimen sliding scale   SubCutaneous three times a day before meals  insulin lispro (HumaLOG) corrective regimen sliding scale   SubCutaneous at bedtime  levothyroxine 150 MICROGram(s) Oral daily  lisinopril 10 milliGRAM(s) Oral daily  magnesium oxide 400 milliGRAM(s) Oral two times a day with meals  sodium chloride 0.9% lock flush 3 milliLiter(s) IV Push every 8 hours    MEDICATIONS  (PRN):  dextrose 40% Gel 15 Gram(s) Oral once PRN Blood Glucose LESS THAN 70 milliGRAM(s)/deciliter  glucagon  Injectable 1 milliGRAM(s) IntraMuscular once PRN Glucose LESS THAN 70 milligrams/deciliter      Vital Signs Last 24 Hrs  T(C): 36.7 (23 May 2018 16:00), Max: 36.7 (23 May 2018 16:00)  T(F): 98 (23 May 2018 16:00), Max: 98 (23 May 2018 16:00)  HR: 95 (23 May 2018 19:01) (94 - 99)  BP: 111/92 (23 May 2018 19:01) (91/71 - 111/92)  BP(mean): --  RR: 18 (23 May 2018 19:01) (18 - 20)  SpO2: 98% (23 May 2018 19:01) (98% - 100%)    PHYSICAL EXAM:    GENERAL: NAD, well-groomed, well-developed  HEAD:  Atraumatic, Normocephalic  EYES: EOMI, PERRLA, conjunctiva and sclera clear  ENMT: No tonsillar erythema, exudates, or enlargement; Moist mucous membranes, Good dentition, No lesions  NECK: Supple, No JVD, Normal thyroid  NERVOUS SYSTEM:  Alert & Oriented X3, Good concentration; Motor Strength 5/5 B/L upper and lower extremities; DTRs 2+ intact and symmetric  CHEST/LUNG: Clear to percussion bilaterally; No rales, rhonchi, wheezing, or rubs  HEART: Regular rate and rhythm; No murmurs, rubs, or gallops  ABDOMEN: Soft, Nontender, Nondistended; Bowel sounds present  EXTREMITIES:  2+ Peripheral Pulses, No clubbing, cyanosis, or edema  LYMPH: No lymphadenopathy noted  SKIN: No rashes or lesions    LABS:                        12.3   7.65  )-----------( 169      ( 23 May 2018 15:40 )             37.0     05-23    128<L>  |  79<L>  |  24<H>  ----------------------------<  130<H>  4.0   |  29  |  1.26    Ca    5.9<LL>      23 May 2018 15:40    TPro  7.1  /  Alb  3.3  /  TBili  5.9<H>  /  DBili  x   /  AST  95<H>  /  ALT  75<H>  /  AlkPhos  289<H>  05-23    PTT - ( 23 May 2018 15:40 )  PTT:29.2 SEC        RADIOLOGY & ADDITIONAL STUDIES: Patient is a 62y old  Female who presents with a chief complaint of Body swelling x 4 weeks       HPI:  62F with a past medical history of hypertension, hyperlipidemia, chronic RBBB,  obesity, thyroid CA, nonischemic cardiomyopathy with an ejection fraction of 11% and normal coronaries on recent cardiac catheterization at [facility] with Dr Chavarria , s/p Medtronic Sofi MRI CRT-ICD placement in June 2017, admitted last month with acute on chronic systolic HF exacerbation, presenting today with SOB, volume overload, acute on chronic systolic HF exacerbation.  She reports compliance with diet and meds, abdominal bloating, EDDY after a few steps, orthopnea cough with yellow phlegm, substernal chest pain associated with cough.  Denies nausea, vomit chills, diaphoresis,   She has seen her OP Cardiologist once since DC.  In the Ed, the pt was given Lasix 40 mg IV x 1 with positive urine output.       PAST MEDICAL & SURGICAL HISTORY:  Asthma  CHF (congestive heart failure): hypothyroid  DM (diabetes mellitus)  HTN (hypertension)  Thyroid ca  S/P thyroidectomy      Social History: Bart smoking ,alcohol or drug abuse.     FAMILY HISTORY:  No pertinent family history in first degree relatives      Allergies    penicillin (Rash)    Intolerances        REVIEW OF SYSTEMS:    CONSTITUTIONAL: No fever, weight loss, or fatigue  EYES: No eye pain, visual disturbances, or discharge  RESPIRATORY: No cough, wheezing, chills or hemoptysis; shortness of breath  CARDIOVASCULAR:  chest pain and  leg swelling  GASTROINTESTINAL: No abdominal or epigastric pain. No nausea, vomiting, or hematemesis; No diarrhea or constipation. No melena or hematochezia.  GENITOURINARY: No dysuria, frequency, hematuria, or incontinence  NEUROLOGICAL: No headaches, memory loss, loss of strength, numbness, or tremors  SKIN: No itching, burning, rashes, or lesions   ENDOCRINE: No heat or cold intolerance; No hair loss  MUSCULOSKELETAL: No joint pain or swelling; No muscle, back, or extremity pain  PSYCHIATRIC: No depression, anxiety, mood swings, or difficulty sleeping      MEDICATIONS  (STANDING):  aspirin enteric coated 81 milliGRAM(s) Oral daily  buMETAnide IVPB 1 milliGRAM(s) IV Intermittent every 12 hours  calcitriol   Capsule 0.25 MICROGram(s) Oral at bedtime  calcium acetate 1334 milliGRAM(s) Oral three times a day with meals  calcium carbonate 500 mG (Tums) Chewable 1 Tablet(s) Chew two times a day  calcium gluconate IVPB 2 Gram(s) IV Intermittent three times a day  carvedilol 3.125 milliGRAM(s) Oral every 12 hours  cholecalciferol 1000 Unit(s) Oral daily  colchicine 0.6 milliGRAM(s) Oral two times a day  dextrose 5%. 1000 milliLiter(s) (50 mL/Hr) IV Continuous <Continuous>  dextrose 50% Injectable 12.5 Gram(s) IV Push once  dextrose 50% Injectable 25 Gram(s) IV Push once  dextrose 50% Injectable 25 Gram(s) IV Push once  famotidine    Tablet 20 milliGRAM(s) Oral two times a day  heparin  Injectable 5000 Unit(s) SubCutaneous every 12 hours  insulin lispro (HumaLOG) corrective regimen sliding scale   SubCutaneous three times a day before meals  insulin lispro (HumaLOG) corrective regimen sliding scale   SubCutaneous at bedtime  levothyroxine 150 MICROGram(s) Oral daily  lisinopril 10 milliGRAM(s) Oral daily  magnesium oxide 400 milliGRAM(s) Oral two times a day with meals  sodium chloride 0.9% lock flush 3 milliLiter(s) IV Push every 8 hours    MEDICATIONS  (PRN):  dextrose 40% Gel 15 Gram(s) Oral once PRN Blood Glucose LESS THAN 70 milliGRAM(s)/deciliter  glucagon  Injectable 1 milliGRAM(s) IntraMuscular once PRN Glucose LESS THAN 70 milligrams/deciliter      Vital Signs Last 24 Hrs  T(C): 36.7 (23 May 2018 16:00), Max: 36.7 (23 May 2018 16:00)  T(F): 98 (23 May 2018 16:00), Max: 98 (23 May 2018 16:00)  HR: 95 (23 May 2018 19:01) (94 - 99)  BP: 111/92 (23 May 2018 19:01) (91/71 - 111/92)  BP(mean): --  RR: 18 (23 May 2018 19:01) (18 - 20)  SpO2: 98% (23 May 2018 19:01) (98% - 100%)    PHYSICAL EXAM:    GENERAL: NAD, well-groomed, well-developed  HEAD:  Atraumatic, Normocephalic  EYES: EOMI, PERRLA, conjunctiva and sclera clear  ENMT: No tonsillar erythema, exudates, or enlargement; Moist mucous membranes, Good dentition, No lesions  NECK: Supple, No JVD, Normal thyroid  NERVOUS SYSTEM:  Alert & Oriented X3, Good concentration; Motor Strength 5/5 B/L upper and lower extremities; DTRs 2+ intact and symmetric  CHEST/LUNG: Clear to percussion bilaterally; No rales, rhonchi, wheezing, or rubs  HEART: Regular rate and rhythm; No murmurs, rubs, or gallops  ABDOMEN: Soft, Nontender, Nondistended; Bowel sounds present  EXTREMITIES:  2+ Peripheral Pulses, No clubbing, cyanosis, or edema  LYMPH: No lymphadenopathy noted  SKIN: No rashes or lesions    LABS:                        12.3   7.65  )-----------( 169      ( 23 May 2018 15:40 )             37.0     05-23    128<L>  |  79<L>  |  24<H>  ----------------------------<  130<H>  4.0   |  29  |  1.26    Ca    5.9<LL>      23 May 2018 15:40    TPro  7.1  /  Alb  3.3  /  TBili  5.9<H>  /  DBili  x   /  AST  95<H>  /  ALT  75<H>  /  AlkPhos  289<H>  05-23    PTT - ( 23 May 2018 15:40 )  PTT:29.2 SEC        RADIOLOGY & ADDITIONAL STUDIES:

## 2018-05-23 NOTE — CONSULT NOTE ADULT - SUBJECTIVE AND OBJECTIVE BOX
HISTORY OF PRESENT ILLNESS:    62 year old Female with past medical history of hypertension, hyperlipidemia, chronic RBBB,  obesity, thyroid CA, nonischemic cardiomyopathy with an ejection fraction of 11% and normal coronaries on recent cardiac catheterization at [facility] with Dr Chavarria , s/p Medtronic Sofi MRI CRT-ICD placement in June 2017, admitted last month with acute on chronic systolic HF exacerbation, presenting today with SOB, volume overload, acute on chronic systolic HF exacerbation.    She reports compliance with diet and meds.  She has seen her OP Cardiologist once since [place].        PAST MEDICAL & SURGICAL HISTORY:  Asthma  CHF (congestive heart failure): hypothyroid  DM (diabetes mellitus)  HTN (hypertension)  Thyroid ca  S/P thyroidectomy      FAMILY HISTORY:  No pertinent family history in first degree relatives      SOCIAL HISTORY:    (x ) Non-smoker ( ) Smoker ( ) Alcohol Abuse ( ) IVDA    Allergies  penicillin (Rash)      MEDICATIONS  (STANDING): list pending      REVIEW OF SYSTEMS:     CONSTITUTIONAL: No fever, chills, weight loss, or fatigue  RESPIRATORY: No cough, wheezing +SOB, +EDDY, +Orthopnea  CARDIOVASCULAR: No chest pain, palpitations, syncope +LE edema  GASTROINTESTINAL: No nausea, vomiting, diarrhea or constipation. No melena or hematochezia. +abdominal discomfort  GENITOURINARY: No dysuria, frequency, hematuria, or incontinence  NEUROLOGICAL: No headaches, memory loss, loss of strength, numbness, or tremors	    [x ] All others negative	  [ ] Unable to obtain    PHYSICAL EXAM:  Vital Signs Last 24 Hrs  T(C): 36.7 (23 May 2018 16:00), Max: 36.7 (23 May 2018 16:00)  T(F): 98 (23 May 2018 16:00), Max: 98 (23 May 2018 16:00)  HR: 94 (23 May 2018 16:00) (94 - 99)  BP: 91/71 (23 May 2018 16:00) (91/71 - 103/76)  RR: 18 (23 May 2018 16:00) (18 - 20)  SpO2: 100% (23 May 2018 16:00) (100% - 100%)    Cardiovascular:  S1 S2 RRR, +JVD  Respiratory: decreased BS bases BL  Psychiatry: A & O x 3, Mood & affect appropriate  Gastrointestinal:  Soft, Non-tender, + BS	  Skin: No rashes, No ecchymoses	  Extremities: 3+pitting edema up thighs    DATA:    ECG:  	A sense V paced    PREVIOUS DIAGNOSTIC TESTING:      < from: Cardiac Cath Lab (01.08.14 @ 16:49) >  VENTRICLES: No LV gram was performed; however, a recent echocardiogram  demonstrated an EF of 25 %.  CORONARY VESSELS: The coronary circulation is co-dominant.  LM:   -- LM: Normal.  LAD:   --  LAD: Normal.  CX:   --  Circumflex: Normal.  RCA:   --  RCA: Normal.  COMPLICATIONS: There were no complications.  SUMMARY:  HEMODYNAMICS: Hemodynamic assessment demonstrates no systemic hypertension,  severely elevated LVEDP, borderline low cardiac output, and markedly  elevated pulmonary capillary wedge pressure.  DIAGNOSTIC RECOMMENDATIONS: The patient's diuretic regimen should be  carefully increased. Patient management should include aggressive medical  therapy. The patient should follow a low fat and low calorie diet. Medical  management is recommended.  Prepared and signed by  Anni Leos M.D.  Signed 01/10/2014 20:19:01    < end of copied text >    < from: TTE with Doppler (w/Cont) (04.26.18 @ 20:04) >  CONCLUSIONS:  1. Mitral annular calcification, otherwise normal mitral  valve. Moderate mitral regurgitation.  2. Moderately dilated left atrium.  LA volume index = 47  cc/m2.  3. Mild left ventricular enlargement.  4. Severe global left ventricular systolic dysfunction.  Endocardial visualization enhanced with intravenous  injection of echo contrast (Definity).  No LV thrombus  seen.  5. Right ventricular enlargement with decreased right  ventricular systolic function.  A device wire is noted in  the right heart.  6. Estimated right ventricular systolic pressure equals 42  mm Hg, assuming right atrial pressure equals 10 mm Hg,  consistent with mild pulmonary hypertension.  7.Normal tricuspid valve.  Severe tricuspid regurgitation.  *** Compared with echocardiogram of 5/16/2017, left  ventricular systolic function has deteriorated.  ------------------------------------------------------------------------  Confirmed on  4/26/2018 - 13:50:18 by Xander Tyson M.D.    < end of copied text >    LAST INTERROGATION 4/19/18: Underlying Rhythm:   Normal sinus rhythm 75 b/min 1st degree AVB    Events/Observation:   One episode of NSVT @ 214 b/min x 8 beats on 4/18/2018.  No atrial tachyarrhythmias. Optivol fluid level increased beyond the threshold since March 1, 2018.  Total BiV pacing 99%                Impression/Plan:  Normal ICD function.  Normal sensing and pacing via iterative testing. Good battery status. Excellent threshold capture.  No reprogramming.      RADIOLOGY:    < from: Xray Chest 1 View- PORTABLE-Urgent (05.23.18 @ 14:41) >  IMPRESSION:  Clear lungs. No pleural effusions or pneumothorax.    Stable left chest wall biventricular AICD and marked cardiomegaly.     Trachea midline.    Unremarkable osseous structures.    < end of copied text >    		  LABS:	 	    CARDIAC MARKERS ( 23 May 2018 15:40 )  x     / < 0.06 ng/mL / 559 u/L / 3.13 ng/mL / x                         12.3   7.65  )-----------( 169      ( 23 May 2018 15:40 )             37.0   128<L>  |  79<L>  |  24<H>  ----------------------------<  130<H>  4.0   |  29  |  1.26    Ca    5.9<LL>      23 May 2018 15:40    TPro  7.1  /  Alb  3.3  /  TBili  5.9<H>  /  DBili  x   /  AST  95<H>  /  ALT  75<H>  /  AlkPhos  289<H>  05-23    PTT - ( 23 May 2018 15:40 )  PTT:29.2 SEC  Serum Pro-Brain Natriuretic Peptide: 3321 pg/mL (05-23 @ 15:40)    ASSESSMENT/PLAN: 	  62 year old Female with past medical history of hypertension, hyperlipidemia, chronic RBBB, obesity, thyroid CA s/p thyroidectomy, hypocalcemia (managed by OP endo Dr murray), nonischemic cardiomyopathy with an ejection fraction of 11% and  normal coronaries on left heart cardiac catheterization 2014 , s/p Medtronic Claria MRI CRT-ICD placement in June 2017, admitted last month with acute on chronic systolic HF exacerbation, presenting today with SOB, volume overload, acute on chronic systolic HF exacerbation    --Last ICD interrogation noted 99% BIV pacing with normal function  --needs aggressive diuresis  --further EP reccs pending hospital course    Reina Chino PA-C

## 2018-05-23 NOTE — H&P ADULT - ATTENDING COMMENTS
Agree with above assessment and plan as outlined above.    - Acute decompensated systolic CHF  - IV lasix  - Keep O>I  - Echo  - EP eval to optimized CRT-D    Augusto Grimes MD, FACC

## 2018-05-23 NOTE — ED PROVIDER NOTE - MEDICAL DECISION MAKING DETAILS
62 y.o female with past medical history of hypertension, hyperlipidemia, chronic RBBB,  obesity, thyroid CA,  nonischemic cardiomyopathy with an ejection fraction of about 25% ,s/p Medtronic ICD placement in June 2017 with SOB and worsening LE and abdominal swelling, recently admitted for the same complaint. Will obtain labs, EKG, xray chest, Rufus, coags, pro BNP. 62 y.o female with past medical history of hypertension, hyperlipidemia, chronic RBBB,  obesity, thyroid CA,  nonischemic cardiomyopathy with an ejection fraction of about 25% ,s/p Medtronic ICD placement in June 2017 with SOB and worsening LE and abdominal swelling, recently admitted for the same complaint. Will obtain labs, EKG, xray chest, Rufus, coags, pro BNP. Admit

## 2018-05-23 NOTE — H&P ADULT - RS GEN PE MLT RESP DETAILS PC
airway patent/breath sounds equal/clear to auscultation bilaterally/respirations non-labored/good air movement/no chest wall tenderness/no intercostal retractions

## 2018-05-23 NOTE — ED PROVIDER NOTE - ATTENDING CONTRIBUTION TO CARE
62F p/w SOb and body swelling and SOB worse with lying flat.  SImilar to admission here in April for CHF.  Pt with AICD.  Pt also notes chest tightness as well.  Pt reports adherence to medications but only a small amount of urine came out today.  Decompesated CHF, check labs, cxr, EKG, rx asa and IV lasix, admit.    [facility] calcitriol, asa, lisinopril, furosemide 40 bid, coreg, now torsemide (off lasix), magnesium, levothyroxin tradjenta, calcium supp.  PMHX - CHF, HTN  PSHX   no T  All PCN - rash.   VS:  Bp soft    GEN - mild SOB; A+O x3   HEAD - NC/AT     ENT - PEERL, EOMI, mucous membranes  moist , no discharge      NECK: Neck supple, non-tender without lymphadenopathy, no masses, no JVD  PULM - fine crackles bialt,  symmetric breath sounds  COR -  normal heart sounds    ABD - , distended, NT, soft, no guarding, no rebound, no masses  abd wall edema  BACK - no CVA tenderness, nontender spine     EXTREMS - bilat 2+ edema, no deformity, warm and well perfused    SKIN - no rash or bruising      NEUROLOGIC - alert, CN 2-12 intact, sensation nl, motor 5/5 RUE/LUE/RLE/LLE.

## 2018-05-23 NOTE — H&P ADULT - PROBLEM SELECTOR PLAN 1
CM  F/U CE's, EKG, I's, O's, Daily weights, Lytes, PT, Dietary consult.  Give O2, ASA Bumex as ordered by renal, ACE, BB.  -obtain prior coronary angiogram report  -no indication for urgent cardiac cath at this time  -however, may need RHC in near future to assess hemodynamics

## 2018-05-23 NOTE — H&P ADULT - NSHPLABSRESULTS_GEN_ALL_CORE
CXR Clear lungs. No pleural effusions or pneumothorax. Stable left chest wall biventricular AICD and marked cardiomegaly.   H &H = 12.3/ 37  Na/ Cl= 128/ 79  B/C= 24/ 1.26  Glucose= 130  AST/ ALT= 95/ 75  CK= 559  Trop< 0.06  BNP = 3321  ON BUMEX 1 mg BID  A1C= 6.8 from 4/2018  TSH= 2.24 from 4/2018 4/26/18--TTE---EF= 11%  1. Mitral annular calcification, otherwise normal mitral  valve. Moderate mitral regurgitation.  2. Moderately dilated left atrium.  LA volume index = 47  cc/m2.  3. Mild left ventricular enlargement.  4. Severe global left ventricular systolic dysfunction.  Endocardial visualization enhanced with intravenous  injection of echo contrast (Definity).  No LV thrombus  seen.  5. Right ventricular enlargement with decreased right  ventricular systolic function.  A device wire is noted in  the right heart.  6. Estimated right ventricular systolic pressure equals 42  mm Hg, assuming right atrial pressure equals 10 mm Hg,  consistent with mild pulmonary hypertension.  7. Normal tricuspid valve.  Severe tricuspid regurgitation.

## 2018-05-23 NOTE — H&P ADULT - HISTORY OF PRESENT ILLNESS
62F with a past medical history of hypertension, hyperlipidemia, chronic RBBB,  obesity, thyroid CA, nonischemic cardiomyopathy with an ejection fraction of 11% and normal coronaries on recent cardiac catheterization at [facility] with Dr Chavarria , s/p Medtronic Sofi MRI CRT-ICD placement in June 2017, admitted last month with acute on chronic systolic HF exacerbation, presenting today with SOB, volume overload, acute on chronic systolic HF exacerbation.  She reports compliance with diet and meds, abdominal bloating, EDDY after a few steps, orthopnea cough with yellow phlegm, substernal chest pain associated with cough.  Denies nausea, vomit chills, diaphoresis,   She has seen her OP Cardiologist once since DC.  In the Ed, the pt was given Lasix 40 mg IV x 1 with positive urine output.

## 2018-05-23 NOTE — H&P ADULT - NEGATIVE NEUROLOGICAL SYMPTOMS
no paresthesias/no weakness/no generalized seizures/no focal seizures/no syncope/no vertigo/no tremors

## 2018-05-23 NOTE — ED ADULT NURSE NOTE - OBJECTIVE STATEMENT
pt alert oriented x 4 c/o feeling sob bi-lateral leg edema SL placed admission labs sent vitals as stated NSR on monitor MD at bedside

## 2018-05-23 NOTE — H&P ADULT - NEUROLOGICAL DETAILS
responds to verbal commands/alert and oriented x 3/no spontaneous movement/sensation intact/normal strength

## 2018-05-24 LAB
BUN SERPL-MCNC: 29 MG/DL — HIGH (ref 7–23)
BUN SERPL-MCNC: 29 MG/DL — HIGH (ref 7–23)
CA-I BLD-SCNC: 0.82 MMOL/L — LOW (ref 1.03–1.23)
CALCIUM SERPL-MCNC: 6.4 MG/DL — CRITICAL LOW (ref 8.4–10.5)
CALCIUM SERPL-MCNC: 7.1 MG/DL — LOW (ref 8.4–10.5)
CHLORIDE SERPL-SCNC: 80 MMOL/L — LOW (ref 98–107)
CHLORIDE SERPL-SCNC: 82 MMOL/L — LOW (ref 98–107)
CHOLEST SERPL-MCNC: 99 MG/DL — LOW (ref 120–199)
CK SERPL-CCNC: 483 U/L — HIGH (ref 25–170)
CK SERPL-CCNC: 500 U/L — HIGH (ref 25–170)
CO2 SERPL-SCNC: 26 MMOL/L — SIGNIFICANT CHANGE UP (ref 22–31)
CO2 SERPL-SCNC: 31 MMOL/L — SIGNIFICANT CHANGE UP (ref 22–31)
CREAT SERPL-MCNC: 1.42 MG/DL — HIGH (ref 0.5–1.3)
CREAT SERPL-MCNC: 1.54 MG/DL — HIGH (ref 0.5–1.3)
GLUCOSE SERPL-MCNC: 131 MG/DL — HIGH (ref 70–99)
GLUCOSE SERPL-MCNC: 86 MG/DL — SIGNIFICANT CHANGE UP (ref 70–99)
HCT VFR BLD CALC: 34.8 % — SIGNIFICANT CHANGE UP (ref 34.5–45)
HDLC SERPL-MCNC: 37 MG/DL — LOW (ref 45–65)
HGB BLD-MCNC: 11.8 G/DL — SIGNIFICANT CHANGE UP (ref 11.5–15.5)
LIPID PNL WITH DIRECT LDL SERPL: 64 MG/DL — SIGNIFICANT CHANGE UP
MAGNESIUM SERPL-MCNC: 1.4 MG/DL — LOW (ref 1.6–2.6)
MAGNESIUM SERPL-MCNC: 1.7 MG/DL — SIGNIFICANT CHANGE UP (ref 1.6–2.6)
MCHC RBC-ENTMCNC: 23.3 PG — LOW (ref 27–34)
MCHC RBC-ENTMCNC: 33.9 % — SIGNIFICANT CHANGE UP (ref 32–36)
MCV RBC AUTO: 68.8 FL — LOW (ref 80–100)
NRBC # FLD: 0.03 — SIGNIFICANT CHANGE UP
PHOSPHATE SERPL-MCNC: 6 MG/DL — HIGH (ref 2.5–4.5)
PLATELET # BLD AUTO: 164 K/UL — SIGNIFICANT CHANGE UP (ref 150–400)
PMV BLD: SIGNIFICANT CHANGE UP FL (ref 7–13)
POTASSIUM SERPL-MCNC: 3.4 MMOL/L — LOW (ref 3.5–5.3)
POTASSIUM SERPL-MCNC: 3.6 MMOL/L — SIGNIFICANT CHANGE UP (ref 3.5–5.3)
POTASSIUM SERPL-SCNC: 3.4 MMOL/L — LOW (ref 3.5–5.3)
POTASSIUM SERPL-SCNC: 3.6 MMOL/L — SIGNIFICANT CHANGE UP (ref 3.5–5.3)
RBC # BLD: 5.06 M/UL — SIGNIFICANT CHANGE UP (ref 3.8–5.2)
RBC # FLD: 22.2 % — HIGH (ref 10.3–14.5)
SODIUM SERPL-SCNC: 127 MMOL/L — LOW (ref 135–145)
SODIUM SERPL-SCNC: 129 MMOL/L — LOW (ref 135–145)
TRIGL SERPL-MCNC: 38 MG/DL — SIGNIFICANT CHANGE UP (ref 10–149)
TROPONIN T SERPL-MCNC: < 0.06 NG/ML — SIGNIFICANT CHANGE UP (ref 0–0.06)
TROPONIN T SERPL-MCNC: < 0.06 NG/ML — SIGNIFICANT CHANGE UP (ref 0–0.06)
WBC # BLD: 7.03 K/UL — SIGNIFICANT CHANGE UP (ref 3.8–10.5)
WBC # FLD AUTO: 7.03 K/UL — SIGNIFICANT CHANGE UP (ref 3.8–10.5)

## 2018-05-24 RX ORDER — BUMETANIDE 0.25 MG/ML
1 INJECTION INTRAMUSCULAR; INTRAVENOUS EVERY 12 HOURS
Qty: 0 | Refills: 0 | Status: DISCONTINUED | OUTPATIENT
Start: 2018-05-24 | End: 2018-05-24

## 2018-05-24 RX ORDER — BUMETANIDE 0.25 MG/ML
1 INJECTION INTRAMUSCULAR; INTRAVENOUS
Qty: 10 | Refills: 0 | Status: DISCONTINUED | OUTPATIENT
Start: 2018-05-24 | End: 2018-05-25

## 2018-05-24 RX ORDER — POTASSIUM CHLORIDE 20 MEQ
40 PACKET (EA) ORAL EVERY 4 HOURS
Qty: 0 | Refills: 0 | Status: COMPLETED | OUTPATIENT
Start: 2018-05-24 | End: 2018-05-25

## 2018-05-24 RX ORDER — POTASSIUM CHLORIDE 20 MEQ
20 PACKET (EA) ORAL ONCE
Qty: 0 | Refills: 0 | Status: COMPLETED | OUTPATIENT
Start: 2018-05-24 | End: 2018-05-24

## 2018-05-24 RX ORDER — MAGNESIUM SULFATE 500 MG/ML
1 VIAL (ML) INJECTION ONCE
Qty: 0 | Refills: 0 | Status: COMPLETED | OUTPATIENT
Start: 2018-05-24 | End: 2018-05-24

## 2018-05-24 RX ORDER — CALCIUM GLUCONATE 100 MG/ML
2 VIAL (ML) INTRAVENOUS THREE TIMES A DAY
Qty: 0 | Refills: 0 | Status: COMPLETED | OUTPATIENT
Start: 2018-05-24 | End: 2018-05-25

## 2018-05-24 RX ORDER — MAGNESIUM OXIDE 400 MG ORAL TABLET 241.3 MG
400 TABLET ORAL ONCE
Qty: 0 | Refills: 0 | Status: COMPLETED | OUTPATIENT
Start: 2018-05-24 | End: 2018-05-24

## 2018-05-24 RX ADMIN — SODIUM CHLORIDE 3 MILLILITER(S): 9 INJECTION INTRAMUSCULAR; INTRAVENOUS; SUBCUTANEOUS at 20:56

## 2018-05-24 RX ADMIN — Medication 1 TABLET(S): at 09:36

## 2018-05-24 RX ADMIN — Medication 200 GRAM(S): at 13:50

## 2018-05-24 RX ADMIN — Medication 1000 UNIT(S): at 12:03

## 2018-05-24 RX ADMIN — Medication 0.6 MILLIGRAM(S): at 05:55

## 2018-05-24 RX ADMIN — SODIUM CHLORIDE 3 MILLILITER(S): 9 INJECTION INTRAMUSCULAR; INTRAVENOUS; SUBCUTANEOUS at 05:56

## 2018-05-24 RX ADMIN — Medication 20 MILLIEQUIVALENT(S): at 12:03

## 2018-05-24 RX ADMIN — CALCITRIOL 0.25 MICROGRAM(S): 0.5 CAPSULE ORAL at 22:13

## 2018-05-24 RX ADMIN — Medication 4: at 12:53

## 2018-05-24 RX ADMIN — Medication 150 MICROGRAM(S): at 05:55

## 2018-05-24 RX ADMIN — Medication 1 TABLET(S): at 17:56

## 2018-05-24 RX ADMIN — MAGNESIUM OXIDE 400 MG ORAL TABLET 400 MILLIGRAM(S): 241.3 TABLET ORAL at 17:56

## 2018-05-24 RX ADMIN — FAMOTIDINE 20 MILLIGRAM(S): 10 INJECTION INTRAVENOUS at 17:56

## 2018-05-24 RX ADMIN — SODIUM CHLORIDE 3 MILLILITER(S): 9 INJECTION INTRAMUSCULAR; INTRAVENOUS; SUBCUTANEOUS at 13:50

## 2018-05-24 RX ADMIN — Medication 200 GRAM(S): at 22:13

## 2018-05-24 RX ADMIN — LISINOPRIL 10 MILLIGRAM(S): 2.5 TABLET ORAL at 05:55

## 2018-05-24 RX ADMIN — Medication 100 GRAM(S): at 15:19

## 2018-05-24 RX ADMIN — Medication 40 MILLIEQUIVALENT(S): at 20:44

## 2018-05-24 RX ADMIN — Medication 1334 MILLIGRAM(S): at 09:37

## 2018-05-24 RX ADMIN — Medication 0.6 MILLIGRAM(S): at 17:56

## 2018-05-24 RX ADMIN — Medication 200 GRAM(S): at 09:36

## 2018-05-24 RX ADMIN — BUMETANIDE 108 MILLIGRAM(S): 0.25 INJECTION INTRAMUSCULAR; INTRAVENOUS at 05:55

## 2018-05-24 RX ADMIN — MAGNESIUM OXIDE 400 MG ORAL TABLET 400 MILLIGRAM(S): 241.3 TABLET ORAL at 09:37

## 2018-05-24 RX ADMIN — Medication 1334 MILLIGRAM(S): at 17:56

## 2018-05-24 RX ADMIN — Medication 1334 MILLIGRAM(S): at 12:03

## 2018-05-24 RX ADMIN — FAMOTIDINE 20 MILLIGRAM(S): 10 INJECTION INTRAVENOUS at 09:37

## 2018-05-24 RX ADMIN — CARVEDILOL PHOSPHATE 3.12 MILLIGRAM(S): 80 CAPSULE, EXTENDED RELEASE ORAL at 09:37

## 2018-05-24 RX ADMIN — Medication 81 MILLIGRAM(S): at 12:03

## 2018-05-24 RX ADMIN — MAGNESIUM OXIDE 400 MG ORAL TABLET 400 MILLIGRAM(S): 241.3 TABLET ORAL at 12:03

## 2018-05-24 RX ADMIN — CARVEDILOL PHOSPHATE 3.12 MILLIGRAM(S): 80 CAPSULE, EXTENDED RELEASE ORAL at 17:56

## 2018-05-24 RX ADMIN — BUMETANIDE 10 MG/HR: 0.25 INJECTION INTRAMUSCULAR; INTRAVENOUS at 15:19

## 2018-05-24 NOTE — DIETITIAN INITIAL EVALUATION ADULT. - ENERGY NEEDS
Weight: 215# (114kg) (5/24), 257.9# (117kg) (5/23) Weight change likely 2/2 fluid shifts as patient admitted with edema.   Height: 67 inches   BMI: 39.3kg/m^2  IBW: 135# (61.3kg)+/-10%

## 2018-05-24 NOTE — PROGRESS NOTE ADULT - SUBJECTIVE AND OBJECTIVE BOX
NEPHROLOGY-[facility] [phone]        Patient seen and examined in bed.  She  felt about the same        MEDICATIONS  (STANDING):  aspirin enteric coated 81 milliGRAM(s) Oral daily  buMETAnide IVPB 1 milliGRAM(s) IV Intermittent every 12 hours  calcitriol   Capsule 0.25 MICROGram(s) Oral at bedtime  calcium acetate 1334 milliGRAM(s) Oral three times a day with meals  calcium carbonate 500 mG (Tums) Chewable 1 Tablet(s) Chew two times a day  calcium gluconate IVPB 2 Gram(s) IV Intermittent three times a day  carvedilol 3.125 milliGRAM(s) Oral every 12 hours  cholecalciferol 1000 Unit(s) Oral daily  colchicine 0.6 milliGRAM(s) Oral two times a day  dextrose 5%. 1000 milliLiter(s) (50 mL/Hr) IV Continuous <Continuous>  dextrose 50% Injectable 12.5 Gram(s) IV Push once  dextrose 50% Injectable 25 Gram(s) IV Push once  dextrose 50% Injectable 25 Gram(s) IV Push once  famotidine    Tablet 20 milliGRAM(s) Oral two times a day  heparin  Injectable 5000 Unit(s) SubCutaneous every 12 hours  insulin lispro (HumaLOG) corrective regimen sliding scale   SubCutaneous three times a day before meals  insulin lispro (HumaLOG) corrective regimen sliding scale   SubCutaneous at bedtime  levothyroxine 150 MICROGram(s) Oral daily  lisinopril 10 milliGRAM(s) Oral daily  magnesium oxide 400 milliGRAM(s) Oral two times a day with meals  sodium chloride 0.9% lock flush 3 milliLiter(s) IV Push every 8 hours      VITAL:  T(C): , Max: 36.7 (05-23-18 @ 16:00)  T(F): , Max: 98 (05-23-18 @ 16:00)  HR: 81 (05-24-18 @ 05:47)  BP: 104/74 (05-24-18 @ 05:47)  BP(mean): --  RR: 20 (05-24-18 @ 05:47)  SpO2: 100% (05-24-18 @ 05:47)  Wt(kg): --    I and O's:    05-23 @ 07:01  -  05-24 @ 07:00  --------------------------------------------------------  IN: 100 mL / OUT: 100 mL / NET: 0 mL    05-24 @ 07:01  -  05-24 @ 08:50  --------------------------------------------------------  IN: 0 mL / OUT: 200 mL / NET: -200 mL      Height (cm): 170.18 (05-23 @ 19:57)  Weight (kg): 117 (05-23 @ 19:57)  BMI (kg/m2): 40.4 (05-23 @ 19:57)  BSA (m2): 2.25 (05-23 @ 19:57)    PHYSICAL EXAM:    Constitutional: NAD  HEENT: PERRLA    Neck:  No JVD  Respiratory: CTAB/L  Cardiovascular: S1 and S2  Gastrointestinal: BS+, soft, NT/ND  Extremities: No peripheral edema  Neurological: A/O x 3, no focal deficits  Psychiatric: Normal mood, normal affect  : No Gustafson  Skin: No rashes  Access: Not applicable    LABS:                        11.8   7.03  )-----------( 164      ( 24 May 2018 07:15 )             34.8     05-23    128<L>  |  79<L>  |  24<H>  ----------------------------<  130<H>  4.0   |  29  |  1.26    Ca    5.9<LL>      23 May 2018 15:40    TPro  7.1  /  Alb  3.3  /  TBili  5.9<H>  /  DBili  x   /  AST  95<H>  /  ALT  75<H>  /  AlkPhos  289<H>  05-23          Urine Studies:          RADIOLOGY & ADDITIONAL STUDIES:

## 2018-05-24 NOTE — PHYSICAL THERAPY INITIAL EVALUATION ADULT - PERTINENT HX OF CURRENT PROBLEM, REHAB EVAL
Pt. is a 63 y/o female admitted to [facility] on 05/23/18 with a dx of acute on chronic combined systolic and diastolic heart failure & body swelling x 4 weeks.  PT consult request secondary to CHF.  H/O Thyroid CA, s/p thyroidectomy.  Cardiac Enzymes (-) x 2.  (-) CXR.

## 2018-05-24 NOTE — PHYSICAL THERAPY INITIAL EVALUATION ADULT - PRECAUTIONS/LIMITATIONS, REHAB EVAL
seizure precautions/(+) Holter monitor, safety precautions, (+) AICD/aspiration precautions/fall precautions

## 2018-05-24 NOTE — PHYSICAL THERAPY INITIAL EVALUATION ADULT - LEVEL OF INDEPENDENCE: STAIR NEGOTIATION, REHAB EVAL
If Millie would like her ears pierced, I think it is a very good idea. My wife is from [place] so little girls get there ears pierced immediately when they are born, I was able to hold her off until 2 months of age.   She took all of the girls to [facility] and watched as it was being done.    Millie's left ear is lobeless, which is normal for many people including myself. Millie is blessed that she has both a lobed and a lobeless ear.   not assessed --> will progress to

## 2018-05-24 NOTE — DIETITIAN INITIAL EVALUATION ADULT. - PHYSICAL APPEARANCE
1+ generalized edema, 3+ edema: right, left feet, right, left ankles, right, left legs/overweight/obese

## 2018-05-24 NOTE — DIETITIAN INITIAL EVALUATION ADULT. - OTHER INFO
Patient seen for nutrition consult - heart failure/DM2 linked order set. Per medical record patient with medical history of h severe NICM s/p MDT AICD, HTN, DM, history of thyroid CA, obesity admitted with acute on chronic systolic heart failure. Patient reports good appetite and PO intake PTA. Consumes 3 meals daily which she cooks/prepares. Reports not using salt while cooking. NKFA. Patient states she checks her Finger sticks 1x daily and that the reading is "normal". HbA1c 6.8% (4/19). Endorses weight gain PTA due to fluid retention. Patient states she may feel nauseous after coughing. No vomiting, diarrhea, constipation noted. Discussed Consistent Carbohydrate diet, portion control and low sodium diet with patient. Also reviewed Fluid Restriction. Patient amenable to education. Written materials left at bedside.

## 2018-05-24 NOTE — PROGRESS NOTE ADULT - ASSESSMENT
62yFemale with severe NICM s/p MDT AICD, HTN, DM, history of thyroid CA, obesity admitted with acute on chronic systolic heart failure.      Problem/Plan - 1:  ·  Problem: Acute on chronic systolic congestive heart failure with severe Cardiomyopathy .  Plan: D/W Renal attending and will start Bumex drip. Heart failure consult pending.  Cardiology helping.     Problem/Plan - 2:  ·  Problem: Essential hypertension.  Plan: BP readings fine. Low salt diet.  Continue BP meds.      Problem/Plan - 3:  ·  Problem: Diabetes mellitus, type 2.  Plan: Sugars in acceptable range .   FS QID  HISS   DM diet.      Problem/Plan - 4:  ·  Problem: Asthma.  Plan: Currently stable.   Not on any medications.      Problem/Plan - 5:  ·  Problem: Hypocalcemia .  Plan :Replacing.      Problem/Plan -6:  ·  Problem: Abnormal LFT  .  Plan : Secondary to Hepatic congestion from CHF      Problem/Plan -7:  ·  Problem: MAGNUS  .  Plan :Renal helping.       Problem/Plan -8:  Problem: Need for prophylactic measure. Plan: VTE with Heparin 5000U sub cut BID.  Fall, Aspirations and safety precautions,

## 2018-05-24 NOTE — PHYSICAL THERAPY INITIAL EVALUATION ADULT - CRITERIA FOR SKILLED THERAPEUTIC INTERVENTIONS
predicted duration of therapy intervention/Home --> no skilled PT needs for discharge/impairments found/therapy frequency/anticipated discharge recommendation

## 2018-05-24 NOTE — PHYSICAL THERAPY INITIAL EVALUATION ADULT - ADDITIONAL COMMENTS
Pt. left sitting at edge of bed post-PT, as received, in NAD with all lines/tubes intact & table/call bell within reach.  RN Louann rahman.

## 2018-05-24 NOTE — PHYSICAL THERAPY INITIAL EVALUATION ADULT - ACTIVE RANGE OF MOTION EXAMINATION, REHAB EVAL
deficits as listed below/Bilateral UE's WFL's except shoulder flexion no greater than 0-90 degrees secondary to h/o AICD placement

## 2018-05-24 NOTE — PROGRESS NOTE ADULT - SUBJECTIVE AND OBJECTIVE BOX
Subjective:   	 denies  chest pain    shortness of breath / LE edema persists       MEDICATIONS:  MEDICATIONS  (STANDING):  aspirin enteric coated 81 milliGRAM(s) Oral daily  buMETAnide Injectable 1 milliGRAM(s) IV Push every 12 hours  calcitriol   Capsule 0.25 MICROGram(s) Oral at bedtime  calcium acetate 1334 milliGRAM(s) Oral three times a day with meals  calcium carbonate 500 mG (Tums) Chewable 1 Tablet(s) Chew two times a day  calcium gluconate IVPB 2 Gram(s) IV Intermittent three times a day  carvedilol 3.125 milliGRAM(s) Oral every 12 hours  cholecalciferol 1000 Unit(s) Oral daily  colchicine 0.6 milliGRAM(s) Oral two times a day  dextrose 5%. 1000 milliLiter(s) (50 mL/Hr) IV Continuous <Continuous>  dextrose 50% Injectable 12.5 Gram(s) IV Push once  dextrose 50% Injectable 25 Gram(s) IV Push once  dextrose 50% Injectable 25 Gram(s) IV Push once  famotidine    Tablet 20 milliGRAM(s) Oral two times a day  heparin  Injectable 5000 Unit(s) SubCutaneous every 12 hours  insulin lispro (HumaLOG) corrective regimen sliding scale   SubCutaneous three times a day before meals  insulin lispro (HumaLOG) corrective regimen sliding scale   SubCutaneous at bedtime  levothyroxine 150 MICROGram(s) Oral daily  lisinopril 10 milliGRAM(s) Oral daily  magnesium oxide 400 milliGRAM(s) Oral two times a day with meals  sodium chloride 0.9% lock flush 3 milliLiter(s) IV Push every 8 hours    MEDICATIONS  (PRN):  dextrose 40% Gel 15 Gram(s) Oral once PRN Blood Glucose LESS THAN 70 milliGRAM(s)/deciliter  glucagon  Injectable 1 milliGRAM(s) IntraMuscular once PRN Glucose LESS THAN 70 milligrams/deciliter      LABS:	 	    CARDIAC MARKERS:  CARDIAC MARKERS ( 24 May 2018 07:15 )  x     / < 0.06 ng/mL / 500 u/L / x     / x      CARDIAC MARKERS ( 24 May 2018 01:20 )  x     / < 0.06 ng/mL / 483 u/L / x     / x      CARDIAC MARKERS ( 23 May 2018 15:40 )  x     / < 0.06 ng/mL / 559 u/L / 3.13 ng/mL / x                                    11.8   7.03  )-----------( 164      ( 24 May 2018 07:15 )             34.8     05-24    127<L>  |  80<L>  |  29<H>  ----------------------------<  131<H>  3.6   |  26  |  1.42<H>    Ca    6.4<LL>      24 May 2018 07:15  Phos  6.0     05-24  Mg     1.4     05-24    TPro  7.1  /  Alb  3.3  /  TBili  5.9<H>  /  DBili  x   /  AST  95<H>  /  ALT  75<H>  /  AlkPhos  289<H>  05-23    COAGS:       proBNP: Serum Pro-Brain Natriuretic Peptide: 3321 pg/mL (05-23 @ 15:40)    Lipid Profile:   HgA1c:   TSH:       PHYSICAL EXAM:  T(C): 36.5 (05-24-18 @ 05:47), Max: 36.7 (05-23-18 @ 16:00)  HR: 82 (05-24-18 @ 09:34) (64 - 95)  BP: 108/79 (05-24-18 @ 09:34) (91/71 - 111/92)  RR: 18 (05-24-18 @ 09:34) (18 - 20)  SpO2: 100% (05-24-18 @ 09:34) (98% - 100%)  Wt(kg): --  I&O's Summary    23 May 2018 07:01  -  24 May 2018 07:00  --------------------------------------------------------  IN: 100 mL / OUT: 100 mL / NET: 0 mL    24 May 2018 07:01  -  24 May 2018 13:09  --------------------------------------------------------  IN: 0 mL / OUT: 300 mL / NET: -300 mL      Height (cm): 170.18 (05-23 @ 19:57)  Weight (kg): 117 (05-23 @ 19:57)  BMI (kg/m2): 40.4 (05-23 @ 19:57)  BSA (m2): 2.25 (05-23 @ 19:57)    	    Cardiovascular: Normal S1 S2,   No JVD, 1/6 ROSALINDA murmur,  Respiratory: Lungs clear to auscultation, normal effort 	  Gastrointestinal:  Soft, Non-tender, + BS	  Extremities + edema,  no cyanosis, no clubbing B/L LE's    Peripheral pulses palpable 2+ bilaterally    TELEMETRY:  	    ECG:  	   RADIOLOGY:   < from: Xray Chest 1 View- PORTABLE-Urgent (05.23.18 @ 14:41) >  IMPRESSION:  Clear lungs. No pleural effusions or pneumothorax.    Stable left chest wall biventricular AICD and marked cardiomegaly.     Trachea midline.    Unremarkable osseous structures.    < end of copied text >      DIAGNOSTIC TESTING:  [ ] Echocardiogram:  < from: TTE with Doppler (w/Cont) (04.26.18 @ 20:04) >  CONCLUSIONS:  EF 11 %   1. Mitral annular calcification, otherwise normal mitral  valve. Moderate mitral regurgitation.  2. Moderately dilated left atrium.  LA volume index = 47  cc/m2.  3. Mild left ventricular enlargement.  4. Severe global left ventricular systolic dysfunction.  Endocardial visualization enhanced with intravenous  injection of echo contrast (Definity).  No LV thrombus  seen.  5. Right ventricular enlargement with decreased right  ventricular systolic function.  A device wire is noted in  the right heart.  6. Estimated right ventricular systolic pressure equals 42  mm Hg, assuming right atrial pressure equals 10 mm Hg,  consistent with mild pulmonary hypertension.  7.Normal tricuspid valve.  Severe tricuspid regurgitation.  *** Compared with echocardiogram of 5/16/2017, left  ventricular systolic function has deteriorated.  ------------------------------------------------------------------------    < end of copied text >    [ ]  Catheterization:  [ ] Stress Test:    OTHER: 	      ASSESSMENT/PLAN: 	62y Female with severe NICM s/p MDT AICD, HTN, DM, history of thyroid CA, obesity   Admitted with acute on chronic systolic heart failure.   -CE  trops neg x 3                  ck 539-->483--> 500   SBNP  3321   - grossly volume overloaded     -IV Lasix to keep O > I     out put 300 cc so far today      monitor electrolytes supplement as required   -obtain prior coronary angiogram report  - F/ U Interventional cardiology            --no indication for urgent cardiac cath at this time             -- may need RHC in near future to assess hemodynamics  F/U EP, medicine and Renal recommendations

## 2018-05-24 NOTE — DIETITIAN INITIAL EVALUATION ADULT. - NS AS NUTRI INTERV MEALS SNACK
1. Recommend Consistent Carbohydrate, DASH/TLC (cholesterol and sodium restricted), No Concentrated Phosphorous diet. 1000mL Fluid Restriction per medical team discretion. 2. Monitor weights, labs, BM's, skin integrity, p.o. intake.

## 2018-05-24 NOTE — PROGRESS NOTE ADULT - SUBJECTIVE AND OBJECTIVE BOX
SUBJECTIVE: +SOB, +orthopnea.  No CP, Palps, syncope.    MEDICATIONS  (STANDING):  aspirin enteric coated 81 milliGRAM(s) Oral daily  buMETAnide Injectable 1 milliGRAM(s) IV Push every 12 hours  calcitriol   Capsule 0.25 MICROGram(s) Oral at bedtime  calcium acetate 1334 milliGRAM(s) Oral three times a day with meals  calcium carbonate 500 mG (Tums) Chewable 1 Tablet(s) Chew two times a day  calcium gluconate IVPB 2 Gram(s) IV Intermittent three times a day  carvedilol 3.125 milliGRAM(s) Oral every 12 hours  cholecalciferol 1000 Unit(s) Oral daily  colchicine 0.6 milliGRAM(s) Oral two times a day  famotidine    Tablet 20 milliGRAM(s) Oral two times a day  heparin  Injectable 5000 Unit(s) SubCutaneous every 12 hours  insulin lispro (HumaLOG) corrective regimen sliding scale   SubCutaneous three times a day before meals  insulin lispro (HumaLOG) corrective regimen sliding scale   SubCutaneous at bedtime  levothyroxine 150 MICROGram(s) Oral daily  lisinopril 10 milliGRAM(s) Oral daily  magnesium oxide 400 milliGRAM(s) Oral two times a day with meals  sodium chloride 0.9% lock flush 3 milliLiter(s) IV Push every 8 hours    MEDICATIONS  (PRN):  dextrose 40% Gel 15 Gram(s) Oral once PRN Blood Glucose LESS THAN 70 milliGRAM(s)/deciliter  glucagon  Injectable 1 milliGRAM(s) IntraMuscular once PRN Glucose LESS THAN 70 milligrams/deciliter      LABS:                        11.8   7.03  )-----------( 164      ( 24 May 2018 07:15 )             34.8     127<L>  |  80<L>  |  29<H>  ----------------------------<  131<H>  3.6   |  26  |  1.42<H>    Ca    6.4<LL>      24 May 2018 07:15  Phos  6.0     05-24  Mg     1.4     05-24  TPro  7.1  /  Alb  3.3  /  TBili  5.9<H>  /  DBili  x   /  AST  95<H>  /  ALT  75<H>  /  AlkPhos  289<H>  05-2    CARDIAC MARKERS ( 24 May 2018 07:15 )  x     / < 0.06 ng/mL / 500 u/L / x     / x      CARDIAC MARKERS ( 24 May 2018 01:20 )  x     / < 0.06 ng/mL / 483 u/L / x     / x      CARDIAC MARKERS ( 23 May 2018 15:40 )  x     / < 0.06 ng/mL / 559 u/L / 3.13 ng/mL / x        Serum Pro-Brain Natriuretic Peptide: 3321 pg/mL (05-23 @ 15:40)    PHYSICAL EXAM:  Vital Signs Last 24 Hrs  T(C): 36.5 (24 May 2018 05:47), Max: 36.7 (23 May 2018 16:00)  T(F): 97.7 (24 May 2018 05:47), Max: 98 (23 May 2018 16:00)  HR: 82 (24 May 2018 09:34) (64 - 95)  BP: 108/79 (24 May 2018 09:34) (91/71 - 111/92)  RR: 18 (24 May 2018 09:34) (18 - 20)  SpO2: 100% (24 May 2018 09:34) (98% - 100%)    Cardiovascular:  S1S2 RRR, No JVD  Respiratory: Lungs clear to auscultation, normal effort  Gastrointestinal: Abdomen soft, ND, NT, +BS  Skin: Warm, dry, intact. No rash.  Musculoskeletal: Normal ROM, normal strength  Ext: No C/C/E B/L LE    DIAGNOSTIC DATA  TELEMETRY: SR/    < from: Cardiac Cath Lab (01.08.14 @ 16:49) >  VENTRICLES: No LV gram was performed; however, a recent echocardiogram  demonstrated an EF of 25 %.  CORONARY VESSELS: The coronary circulation is co-dominant.  LM:   -- LM: Normal.  LAD:   --  LAD: Normal.  CX:   --  Circumflex: Normal.  RCA:   --  RCA: Normal.  COMPLICATIONS: There were no complications.  SUMMARY:  HEMODYNAMICS: Hemodynamic assessment demonstrates no systemic hypertension,  severely elevated LVEDP, borderline low cardiac output, and markedly  elevated pulmonary capillary wedge pressure.  DIAGNOSTIC RECOMMENDATIONS: The patient's diuretic regimen should be  carefully increased. Patient management should include aggressive medical  therapy. The patient should follow a low fat and low calorie diet. Medical  management is recommended.  Prepared and signed by  Anni Leos M.D.  Signed 01/10/2014 20:19:01    < end of copied text >    < from: TTE with Doppler (w/Cont) (04.26.18 @ 20:04) >  CONCLUSIONS:  1. Mitral annular calcification, otherwise normal mitral  valve. Moderate mitral regurgitation.  2. Moderately dilated left atrium.  LA volume index = 47  cc/m2.  3. Mild left ventricular enlargement.  4. Severe global left ventricular systolic dysfunction.  Endocardial visualization enhanced with intravenous  injection of echo contrast (Definity).  No LV thrombus  seen.  5. Right ventricular enlargement with decreased right  ventricular systolic function.  A device wire is noted in  the right heart.  6. Estimated right ventricular systolic pressure equals 42  mm Hg, assuming right atrial pressure equals 10 mm Hg,  consistent with mild pulmonary hypertension.  7.Normal tricuspid valve.  Severe tricuspid regurgitation.  *** Compared with echocardiogram of 5/16/2017, left  ventricular systolic function has deteriorated.  ------------------------------------------------------------------------  Confirmed on  4/26/2018 - 13:50:18 by Xander Tyson M.D.    < end of copied text >    LAST INTERROGATION 4/19/18: Underlying Rhythm:   Normal sinus rhythm 75 b/min 1st degree AVB    Events/Observation:   One episode of NSVT @ 214 b/min x 8 beats on 4/18/2018.  No atrial tachyarrhythmias. Optivol fluid level increased beyond the threshold since March 1, 2018.  Total BiV pacing 99%                Impression/Plan:  Normal ICD function.  Normal sensing and pacing via iterative testing. Good battery status. Excellent threshold capture.  No reprogramming.      RADIOLOGY:    < from: Xray Chest 1 View- PORTABLE-Urgent (05.23.18 @ 14:41) >  IMPRESSION:  Clear lungs. No pleural effusions or pneumothorax.    Stable left chest wall biventricular AICD and marked cardiomegaly.     Trachea midline.    Unremarkable osseous structures.    < end of copied text >    ASSESSMENT AND PLAN:  62 year old Female with past medical history of hypertension, hyperlipidemia, chronic RBBB, obesity, thyroid CA s/p thyroidectomy, hypocalcemia (managed by OP endo Dr murray), nonischemic cardiomyopathy with an ejection fraction of 11% and  normal coronaries on left heart cardiac catheterization 2014 , s/p [employer] MRI CRT-ICD placement in June 2017, admitted last month with acute on chronic systolic HF exacerbation, presenting today with SOB, volume overload, acute on chronic systolic HF exacerbation    --Last interrogation noted 99% BIV pacing with normal function, will review that her BIVICD is optimized  --needs aggressive diuresis  --further EP reccs pending hospital course    Reina Chino PA-C

## 2018-05-24 NOTE — DIETITIAN INITIAL EVALUATION ADULT. - DIET TYPE
1000ml/DASH/TLC (sodium and cholesterol restricted diet)/regular/consistent carbohydrate (no snacks)

## 2018-05-24 NOTE — PROGRESS NOTE ADULT - SUBJECTIVE AND OBJECTIVE BOX
INTERVAL HPI/OVERNIGHT EVENTS: Did not urinate a lot .  in room.   Vital Signs Last 24 Hrs  T(C): 36.5 (24 May 2018 05:47), Max: 36.7 (23 May 2018 16:00)  T(F): 97.7 (24 May 2018 05:47), Max: 98 (23 May 2018 16:00)  HR: 82 (24 May 2018 09:34) (64 - 99)  BP: 108/79 (24 May 2018 09:34) (91/71 - 111/92)  BP(mean): --  RR: 18 (24 May 2018 09:34) (18 - 20)  SpO2: 100% (24 May 2018 09:34) (98% - 100%)  I&O's Summary    23 May 2018 07:01  -  24 May 2018 07:00  --------------------------------------------------------  IN: 100 mL / OUT: 100 mL / NET: 0 mL    24 May 2018 07:01  -  24 May 2018 12:28  --------------------------------------------------------  IN: 0 mL / OUT: 300 mL / NET: -300 mL      MEDICATIONS  (STANDING):  aspirin enteric coated 81 milliGRAM(s) Oral daily  buMETAnide Injectable 1 milliGRAM(s) IV Push every 12 hours  calcitriol   Capsule 0.25 MICROGram(s) Oral at bedtime  calcium acetate 1334 milliGRAM(s) Oral three times a day with meals  calcium carbonate 500 mG (Tums) Chewable 1 Tablet(s) Chew two times a day  calcium gluconate IVPB 2 Gram(s) IV Intermittent three times a day  carvedilol 3.125 milliGRAM(s) Oral every 12 hours  cholecalciferol 1000 Unit(s) Oral daily  colchicine 0.6 milliGRAM(s) Oral two times a day  dextrose 5%. 1000 milliLiter(s) (50 mL/Hr) IV Continuous <Continuous>  dextrose 50% Injectable 12.5 Gram(s) IV Push once  dextrose 50% Injectable 25 Gram(s) IV Push once  dextrose 50% Injectable 25 Gram(s) IV Push once  famotidine    Tablet 20 milliGRAM(s) Oral two times a day  heparin  Injectable 5000 Unit(s) SubCutaneous every 12 hours  insulin lispro (HumaLOG) corrective regimen sliding scale   SubCutaneous three times a day before meals  insulin lispro (HumaLOG) corrective regimen sliding scale   SubCutaneous at bedtime  levothyroxine 150 MICROGram(s) Oral daily  lisinopril 10 milliGRAM(s) Oral daily  magnesium oxide 400 milliGRAM(s) Oral two times a day with meals  sodium chloride 0.9% lock flush 3 milliLiter(s) IV Push every 8 hours    MEDICATIONS  (PRN):  dextrose 40% Gel 15 Gram(s) Oral once PRN Blood Glucose LESS THAN 70 milliGRAM(s)/deciliter  glucagon  Injectable 1 milliGRAM(s) IntraMuscular once PRN Glucose LESS THAN 70 milligrams/deciliter    LABS:                        11.8   7.03  )-----------( 164      ( 24 May 2018 07:15 )             34.8     05-24    127<L>  |  80<L>  |  29<H>  ----------------------------<  131<H>  3.6   |  26  |  1.42<H>    Ca    6.4<LL>      24 May 2018 07:15  Phos  6.0     05-24  Mg     1.4     05-24    TPro  7.1  /  Alb  3.3  /  TBili  5.9<H>  /  DBili  x   /  AST  95<H>  /  ALT  75<H>  /  AlkPhos  289<H>  05-23    PTT - ( 23 May 2018 15:40 )  PTT:29.2 SEC    CAPILLARY BLOOD GLUCOSE      POCT Blood Glucose.: 207 mg/dL (24 May 2018 12:18)  POCT Blood Glucose.: 126 mg/dL (24 May 2018 08:18)  POCT Blood Glucose.: 182 mg/dL (23 May 2018 22:05)  POCT Blood Glucose.: 139 mg/dL (23 May 2018 12:48)          REVIEW OF SYSTEMS:  CONSTITUTIONAL: No fever, weight loss, or fatigue  EYES: No eye pain, visual disturbances, or discharge  ENMT:  No difficulty hearing, tinnitus, vertigo; No sinus or throat pain  NECK: No pain or stiffness  BREASTS: No pain, masses, or nipple discharge  RESPIRATORY: No cough, wheezing, chills or hemoptysis;  shortness of breath  CARDIOVASCULAR: No chest pain, palpitations, dizziness, but leg swelling  GASTROINTESTINAL: No abdominal or epigastric pain. No nausea, vomiting, or hematemesis; No diarrhea or constipation. No melena or hematochezia.  GENITOURINARY: No dysuria, frequency, hematuria, or incontinence  NEUROLOGICAL: No headaches, memory loss, loss of strength, numbness, or tremors  SKIN: No itching, burning, rashes, or lesions   LYMPH NODES: No enlarged glands  ENDOCRINE: No heat or cold intolerance; No hair loss  MUSCULOSKELETAL: No joint pain or swelling; No muscle, back, or extremity pain  PSYCHIATRIC: No depression, anxiety, mood swings, or difficulty sleeping  HEME/LYMPH: No easy bruising, or bleeding gums  ALLERY AND IMMUNOLOGIC: No hives or eczema    RADIOLOGY & ADDITIONAL TESTS:    Consultant(s) Notes Reviewed:  [x ] YES  [ ] NO    PHYSICAL EXAM:  GENERAL: NAD, well-groomed, well-developed, not in any distress ,  HEAD:  Atraumatic, Normocephalic  EYES: EOMI, PERRLA, conjunctiva and sclera clear  ENMT: No tonsillar erythema, exudates, or enlargement; Moist mucous membranes, Good dentition, No lesions  NECK: Supple, No JVD, Normal thyroid  NERVOUS SYSTEM:  Alert & Oriented X3, No focal deficit   CHEST/LUNG: Good air entry bilateral with no  rales, rhonchi, wheezing, or rubs  HEART: Regular rate and rhythm; No murmurs, rubs, or gallops  ABDOMEN: Soft, Nontender, distended; Bowel sounds present  EXTREMITIES:  2+ Peripheral Pulses, No clubbing, cyanosis, but 3+edema  SKIN: No rashes or lesions    Care Discussed with Consultants/Other Providers [ x] YES  [ ] NO

## 2018-05-24 NOTE — PROGRESS NOTE ADULT - SUBJECTIVE AND OBJECTIVE BOX
Subjective: No chest pain; sob slightly improved   	  MEDICATIONS:  MEDICATIONS  (STANDING):  aspirin enteric coated 81 milliGRAM(s) Oral daily  buMETAnide Infusion 1 mG/Hr (10 mL/Hr) IV Continuous <Continuous>  calcitriol   Capsule 0.25 MICROGram(s) Oral at bedtime  calcium acetate 1334 milliGRAM(s) Oral three times a day with meals  calcium carbonate 500 mG (Tums) Chewable 1 Tablet(s) Chew two times a day  calcium gluconate IVPB 2 Gram(s) IV Intermittent three times a day  carvedilol 3.125 milliGRAM(s) Oral every 12 hours  cholecalciferol 1000 Unit(s) Oral daily  colchicine 0.6 milliGRAM(s) Oral two times a day  dextrose 5%. 1000 milliLiter(s) (50 mL/Hr) IV Continuous <Continuous>  dextrose 50% Injectable 12.5 Gram(s) IV Push once  dextrose 50% Injectable 25 Gram(s) IV Push once  dextrose 50% Injectable 25 Gram(s) IV Push once  famotidine    Tablet 20 milliGRAM(s) Oral two times a day  heparin  Injectable 5000 Unit(s) SubCutaneous every 12 hours  insulin lispro (HumaLOG) corrective regimen sliding scale   SubCutaneous three times a day before meals  insulin lispro (HumaLOG) corrective regimen sliding scale   SubCutaneous at bedtime  levothyroxine 150 MICROGram(s) Oral daily  lisinopril 10 milliGRAM(s) Oral daily  magnesium oxide 400 milliGRAM(s) Oral two times a day with meals  magnesium sulfate  IVPB 1 Gram(s) IV Intermittent once  sodium chloride 0.9% lock flush 3 milliLiter(s) IV Push every 8 hours      LABS:	 	    CARDIAC MARKERS:  CARDIAC MARKERS ( 24 May 2018 07:15 )  x     / < 0.06 ng/mL / 500 u/L / x     / x      CARDIAC MARKERS ( 24 May 2018 01:20 )  x     / < 0.06 ng/mL / 483 u/L / x     / x      CARDIAC MARKERS ( 23 May 2018 15:40 )  x     / < 0.06 ng/mL / 559 u/L / 3.13 ng/mL / x                                    11.8   7.03  )-----------( 164      ( 24 May 2018 07:15 )             34.8     05-24    127<L>  |  80<L>  |  29<H>  ----------------------------<  131<H>  3.6   |  26  |  1.42<H>    Ca    6.4<LL>      24 May 2018 07:15  Phos  6.0     05-24  Mg     1.4     05-24    TPro  7.1  /  Alb  3.3  /  TBili  5.9<H>  /  DBili  x   /  AST  95<H>  /  ALT  75<H>  /  AlkPhos  289<H>  05-23    proBNP: Serum Pro-Brain Natriuretic Peptide: 3321 pg/mL (05-23 @ 15:40)    Lipid Profile:   HgA1c:   TSH:       PHYSICAL EXAM:  T(C): 36.3 (05-24-18 @ 13:53), Max: 36.7 (05-23-18 @ 16:00)  HR: 83 (05-24-18 @ 13:53) (64 - 95)  BP: 99/64 (05-24-18 @ 13:53) (91/71 - 111/92)  RR: 18 (05-24-18 @ 13:53) (18 - 20)  SpO2: 99% (05-24-18 @ 13:53) (98% - 100%)  Wt(kg): --  I&O's Summary    23 May 2018 07:01  -  24 May 2018 07:00  --------------------------------------------------------  IN: 100 mL / OUT: 100 mL / NET: 0 mL    24 May 2018 07:01  -  24 May 2018 15:09  --------------------------------------------------------  IN: 0 mL / OUT: 300 mL / NET: -300 mL      Height (cm): 170.18 (05-23 @ 19:57)  Weight (kg): 117 (05-23 @ 19:57)  BMI (kg/m2): 40.4 (05-23 @ 19:57)  BSA (m2): 2.25 (05-23 @ 19:57)    Heart: normal S1, S2, RRR, no m/r/g  Lungs: cta bilaterally  Abd: soft nT, nD  Ext: 2+ pitting edema bilaterally; warm to touch     TELEMETRY: 	    ECG:  	  RADIOLOGY:   DIAGNOSTIC TESTING:  [ ] Echocardiogram: < from: TTE with Doppler (w/Cont) (04.26.18 @ 20:04) >  CONCLUSIONS:  1. Mitral annular calcification, otherwise normal mitral  valve. Moderate mitral regurgitation.  2. Moderately dilated left atrium.  LA volume index = 47  cc/m2.  3. Mild left ventricular enlargement.  4. Severe global left ventricular systolic dysfunction.  Endocardial visualization enhanced with intravenous  injection of echo contrast (Definity).  No LV thrombus  seen.  5. Right ventricular enlargement with decreased right  ventricular systolic function.  A device wire is noted in  the right heart.  6. Estimated right ventricular systolic pressure equals 42  mm Hg, assuming right atrial pressure equals 10 mm Hg,  consistent with mild pulmonary hypertension.  7.Normal tricuspid valve.  Severe tricuspid regurgitation.  *** Compared with echocardiogram of 5/16/2017, left  ventricular systolic function has deteriorated.    < end of copied text >    [ ]  Catheterization:  < from: Cardiac Cath Lab (01.08.14 @ 16:49) >  CORONARY VESSELS: The coronary circulation is co-dominant.  LM:   -- LM: Normal.  LAD:   --  LAD: Normal.  CX:   --  Circumflex: Normal.  RCA:   --  RCA: Normal.  COMPLICATIONS: There were no complications.    < end of copied text >    [ ] Stress Test:    OTHER: 	      ASSESSMENT/PLAN: 	62y Female with severe NICM s/p AICD, HTN, DM admitted with acute on chronic systolic heart failure.   -Pt. still volume overloaded but warm to touch   -will change Bumex IV to Bumex gtt  -pt. chest pressure likely secondary to heart failure - given negative enzymes and prior cardiac cath in 2014 showing normal coronary arteries, no urgent coronary angiogram needed at this time  -if patient volume status / UO does not improve, will plan for RHC in am  -management of heart failure per cardiology  -follow up renal    Kunal Muller MD

## 2018-05-24 NOTE — PHYSICAL THERAPY INITIAL EVALUATION ADULT - DISCHARGE DISPOSITION, PT EVAL
pt. will benefit from skilled PT while inpatient at [facility] for 1-2 more sessions/home/no skilled PT needs

## 2018-05-24 NOTE — PROGRESS NOTE ADULT - ASSESSMENT
The patient is a deidre 62-year-old female with past medical history of hypertension, diabetes, thyroid cancer, with presumed removal of parathyroid gland, congestive nonischemic cardiomyopathy, congestive heart failure, and pulmonary hypertension who presents with shortness of breath.  She is grossly volume overloaded.  The patient has hypervolemic hyponatremia likely secondary to heart failure.  She also has hypocalcemia likely secondary to the fact that she likely has no parathyroid gland.    1.	Endocrine.  Cont IV calcium 2 g q. 8 h. times three doses and then reassess tomorrow.  Will order vitamin D level.  I suspect that she will benefit from this as well.  She can continue p.o. calcium supplementation and rocaltrol.  2.	Renal.  Will start Bumex 1 mg IV twice a day(no dose given last night).  She needs out greater than in.  She is grossly volume overloaded.  3.	Gastrointestinal.  Will trend LFT.  This might be congestive hepatic pathology.

## 2018-05-25 DIAGNOSIS — R74.8 ABNORMAL LEVELS OF OTHER SERUM ENZYMES: ICD-10-CM

## 2018-05-25 DIAGNOSIS — R57.0 CARDIOGENIC SHOCK: ICD-10-CM

## 2018-05-25 DIAGNOSIS — R10.84 GENERALIZED ABDOMINAL PAIN: ICD-10-CM

## 2018-05-25 DIAGNOSIS — E03.9 HYPOTHYROIDISM, UNSPECIFIED: ICD-10-CM

## 2018-05-25 DIAGNOSIS — E83.51 HYPOCALCEMIA: ICD-10-CM

## 2018-05-25 DIAGNOSIS — I50.23 ACUTE ON CHRONIC SYSTOLIC (CONGESTIVE) HEART FAILURE: ICD-10-CM

## 2018-05-25 LAB
24R-OH-CALCIDIOL SERPL-MCNC: 47.9 NG/ML — SIGNIFICANT CHANGE UP (ref 30–80)
ALBUMIN SERPL ELPH-MCNC: 3.1 G/DL — LOW (ref 3.3–5)
ALP SERPL-CCNC: 293 U/L — HIGH (ref 40–120)
ALT FLD-CCNC: 57 U/L — HIGH (ref 4–33)
APPEARANCE UR: CLEAR — SIGNIFICANT CHANGE UP
AST SERPL-CCNC: 61 U/L — HIGH (ref 4–32)
BASE EXCESS BLDV CALC-SCNC: 8.3 MMOL/L — SIGNIFICANT CHANGE UP
BASE EXCESS BLDV CALC-SCNC: 9.8 MMOL/L — SIGNIFICANT CHANGE UP
BILIRUB SERPL-MCNC: 4.5 MG/DL — HIGH (ref 0.2–1.2)
BILIRUB UR-MCNC: NEGATIVE — SIGNIFICANT CHANGE UP
BLOOD UR QL VISUAL: HIGH
BUN SERPL-MCNC: 29 MG/DL — HIGH (ref 7–23)
BUN SERPL-MCNC: 32 MG/DL — HIGH (ref 7–23)
CALCIUM SERPL-MCNC: 6.9 MG/DL — LOW (ref 8.4–10.5)
CALCIUM SERPL-MCNC: 7.3 MG/DL — LOW (ref 8.4–10.5)
CHLORIDE SERPL-SCNC: 82 MMOL/L — LOW (ref 98–107)
CHLORIDE SERPL-SCNC: 84 MMOL/L — LOW (ref 98–107)
CO2 SERPL-SCNC: 27 MMOL/L — SIGNIFICANT CHANGE UP (ref 22–31)
CO2 SERPL-SCNC: 30 MMOL/L — SIGNIFICANT CHANGE UP (ref 22–31)
COLOR SPEC: SIGNIFICANT CHANGE UP
CREAT SERPL-MCNC: 1.13 MG/DL — SIGNIFICANT CHANGE UP (ref 0.5–1.3)
CREAT SERPL-MCNC: 1.39 MG/DL — HIGH (ref 0.5–1.3)
GLUCOSE SERPL-MCNC: 146 MG/DL — HIGH (ref 70–99)
GLUCOSE SERPL-MCNC: 178 MG/DL — HIGH (ref 70–99)
GLUCOSE UR-MCNC: NEGATIVE — SIGNIFICANT CHANGE UP
HCO3 BLDV-SCNC: 30 MMOL/L — HIGH (ref 20–27)
HCO3 BLDV-SCNC: 32 MMOL/L — HIGH (ref 20–27)
HCT VFR BLD CALC: 35.7 % — SIGNIFICANT CHANGE UP (ref 34.5–45)
HGB BLD-MCNC: 12.1 G/DL — SIGNIFICANT CHANGE UP (ref 11.5–15.5)
KETONES UR-MCNC: NEGATIVE — SIGNIFICANT CHANGE UP
LEUKOCYTE ESTERASE UR-ACNC: NEGATIVE — SIGNIFICANT CHANGE UP
MAGNESIUM SERPL-MCNC: 1.6 MG/DL — SIGNIFICANT CHANGE UP (ref 1.6–2.6)
MAGNESIUM SERPL-MCNC: 1.6 MG/DL — SIGNIFICANT CHANGE UP (ref 1.6–2.6)
MCHC RBC-ENTMCNC: 23.4 PG — LOW (ref 27–34)
MCHC RBC-ENTMCNC: 33.9 % — SIGNIFICANT CHANGE UP (ref 32–36)
MCV RBC AUTO: 68.9 FL — LOW (ref 80–100)
MUCOUS THREADS # UR AUTO: SIGNIFICANT CHANGE UP
NITRITE UR-MCNC: NEGATIVE — SIGNIFICANT CHANGE UP
NRBC # FLD: 0 — SIGNIFICANT CHANGE UP
NT-PROBNP SERPL-SCNC: 1996 PG/ML — SIGNIFICANT CHANGE UP
PCO2 BLDV: 48 MMHG — SIGNIFICANT CHANGE UP (ref 41–51)
PCO2 BLDV: 52 MMHG — HIGH (ref 41–51)
PH BLDV: 7.42 PH — SIGNIFICANT CHANGE UP (ref 7.32–7.43)
PH BLDV: 7.47 PH — HIGH (ref 7.32–7.43)
PH UR: 7 — SIGNIFICANT CHANGE UP (ref 4.6–8)
PHOSPHATE SERPL-MCNC: 5.6 MG/DL — HIGH (ref 2.5–4.5)
PLATELET # BLD AUTO: 185 K/UL — SIGNIFICANT CHANGE UP (ref 150–400)
PMV BLD: SIGNIFICANT CHANGE UP FL (ref 7–13)
PO2 BLDV: 25 MMHG — LOW (ref 35–40)
PO2 BLDV: 29 MMHG — LOW (ref 35–40)
POTASSIUM SERPL-MCNC: 3.8 MMOL/L — SIGNIFICANT CHANGE UP (ref 3.5–5.3)
POTASSIUM SERPL-MCNC: 4.2 MMOL/L — SIGNIFICANT CHANGE UP (ref 3.5–5.3)
POTASSIUM SERPL-SCNC: 3.8 MMOL/L — SIGNIFICANT CHANGE UP (ref 3.5–5.3)
POTASSIUM SERPL-SCNC: 4.2 MMOL/L — SIGNIFICANT CHANGE UP (ref 3.5–5.3)
PROT SERPL-MCNC: 6.8 G/DL — SIGNIFICANT CHANGE UP (ref 6–8.3)
PROT UR-MCNC: NEGATIVE MG/DL — SIGNIFICANT CHANGE UP
RBC # BLD: 5.18 M/UL — SIGNIFICANT CHANGE UP (ref 3.8–5.2)
RBC # FLD: 22.2 % — HIGH (ref 10.3–14.5)
RBC CASTS # UR COMP ASSIST: SIGNIFICANT CHANGE UP (ref 0–?)
SAO2 % BLDV: 35.6 % — LOW (ref 60–85)
SAO2 % BLDV: 47.2 % — LOW (ref 60–85)
SODIUM SERPL-SCNC: 126 MMOL/L — LOW (ref 135–145)
SODIUM SERPL-SCNC: 128 MMOL/L — LOW (ref 135–145)
SP GR SPEC: 1.01 — SIGNIFICANT CHANGE UP (ref 1–1.04)
UROBILINOGEN FLD QL: NORMAL MG/DL — SIGNIFICANT CHANGE UP
WBC # BLD: 7.69 K/UL — SIGNIFICANT CHANGE UP (ref 3.8–10.5)
WBC # FLD AUTO: 7.69 K/UL — SIGNIFICANT CHANGE UP (ref 3.8–10.5)
WBC UR QL: SIGNIFICANT CHANGE UP (ref 0–?)

## 2018-05-25 PROCEDURE — 99291 CRITICAL CARE FIRST HOUR: CPT

## 2018-05-25 PROCEDURE — 71045 X-RAY EXAM CHEST 1 VIEW: CPT | Mod: 26

## 2018-05-25 RX ORDER — DOBUTAMINE HCL 250MG/20ML
1 VIAL (ML) INTRAVENOUS
Qty: 500 | Refills: 0 | Status: DISCONTINUED | OUTPATIENT
Start: 2018-05-25 | End: 2018-05-29

## 2018-05-25 RX ORDER — HYDRALAZINE HCL 50 MG
12.5 TABLET ORAL EVERY 8 HOURS
Qty: 0 | Refills: 0 | Status: DISCONTINUED | OUTPATIENT
Start: 2018-05-25 | End: 2018-05-25

## 2018-05-25 RX ORDER — HYDRALAZINE HCL 50 MG
10 TABLET ORAL EVERY 8 HOURS
Qty: 0 | Refills: 0 | Status: DISCONTINUED | OUTPATIENT
Start: 2018-05-25 | End: 2018-05-26

## 2018-05-25 RX ORDER — HYDRALAZINE HCL 50 MG
25 TABLET ORAL EVERY 8 HOURS
Qty: 0 | Refills: 0 | Status: DISCONTINUED | OUTPATIENT
Start: 2018-05-25 | End: 2018-05-25

## 2018-05-25 RX ORDER — BUMETANIDE 0.25 MG/ML
2 INJECTION INTRAMUSCULAR; INTRAVENOUS ONCE
Qty: 0 | Refills: 0 | Status: COMPLETED | OUTPATIENT
Start: 2018-05-25 | End: 2018-05-25

## 2018-05-25 RX ORDER — BUMETANIDE 0.25 MG/ML
1 INJECTION INTRAMUSCULAR; INTRAVENOUS
Qty: 10 | Refills: 0 | Status: DISCONTINUED | OUTPATIENT
Start: 2018-05-25 | End: 2018-05-27

## 2018-05-25 RX ORDER — MILRINONE LACTATE 1 MG/ML
0.25 INJECTION, SOLUTION INTRAVENOUS
Qty: 20 | Refills: 0 | Status: DISCONTINUED | OUTPATIENT
Start: 2018-05-25 | End: 2018-05-25

## 2018-05-25 RX ADMIN — Medication 40 MILLIEQUIVALENT(S): at 01:00

## 2018-05-25 RX ADMIN — Medication 17.05 MICROGRAM(S)/KG/MIN: at 20:50

## 2018-05-25 RX ADMIN — BUMETANIDE 2 MILLIGRAM(S): 0.25 INJECTION INTRAMUSCULAR; INTRAVENOUS at 21:28

## 2018-05-25 RX ADMIN — Medication 8.53 MICROGRAM(S)/KG/MIN: at 17:47

## 2018-05-25 RX ADMIN — Medication 1 TABLET(S): at 21:29

## 2018-05-25 RX ADMIN — Medication 2: at 09:10

## 2018-05-25 RX ADMIN — CALCITRIOL 0.25 MICROGRAM(S): 0.5 CAPSULE ORAL at 23:21

## 2018-05-25 RX ADMIN — Medication 1334 MILLIGRAM(S): at 09:13

## 2018-05-25 RX ADMIN — Medication 10 MILLIGRAM(S): at 21:30

## 2018-05-25 RX ADMIN — BUMETANIDE 10 MG/HR: 0.25 INJECTION INTRAMUSCULAR; INTRAVENOUS at 20:08

## 2018-05-25 RX ADMIN — Medication 200 GRAM(S): at 06:50

## 2018-05-25 RX ADMIN — MAGNESIUM OXIDE 400 MG ORAL TABLET 400 MILLIGRAM(S): 241.3 TABLET ORAL at 18:44

## 2018-05-25 RX ADMIN — SODIUM CHLORIDE 3 MILLILITER(S): 9 INJECTION INTRAMUSCULAR; INTRAVENOUS; SUBCUTANEOUS at 18:30

## 2018-05-25 RX ADMIN — MAGNESIUM OXIDE 400 MG ORAL TABLET 400 MILLIGRAM(S): 241.3 TABLET ORAL at 09:09

## 2018-05-25 RX ADMIN — Medication 81 MILLIGRAM(S): at 10:07

## 2018-05-25 RX ADMIN — SODIUM CHLORIDE 3 MILLILITER(S): 9 INJECTION INTRAMUSCULAR; INTRAVENOUS; SUBCUTANEOUS at 07:12

## 2018-05-25 RX ADMIN — FAMOTIDINE 20 MILLIGRAM(S): 10 INJECTION INTRAVENOUS at 18:43

## 2018-05-25 RX ADMIN — HEPARIN SODIUM 5000 UNIT(S): 5000 INJECTION INTRAVENOUS; SUBCUTANEOUS at 18:43

## 2018-05-25 RX ADMIN — Medication 200 GRAM(S): at 16:00

## 2018-05-25 RX ADMIN — Medication 1 TABLET(S): at 05:07

## 2018-05-25 RX ADMIN — Medication 1334 MILLIGRAM(S): at 18:42

## 2018-05-25 NOTE — CONSULT NOTE ADULT - SUBJECTIVE AND OBJECTIVE BOX
HPI:  Ms. Carson is a 62 year old woman with a past medical history of chronic systolic heart failure that was first diagnosed in 2013. She is s/p [employer] CRT-ICD placement in June 2017. She has had multiple admissions for heart failure over the last year. She was admitted last month with acute on chronic systolic HF exacerbation, presenting today with SOB, volume overload, acute on chronic systolic HF exacerbation.  She reports compliance with diet and meds, abdominal bloating, EDDY after a few steps, orthopnea cough with yellow phlegm, substernal chest pain associated with cough.  Denies nausea, vomit chills, diaphoresis,   She has seen her OP Cardiologist once since DC.  In the Ed, the pt was given Lasix 40 mg IV x 1 with positive urine output.     Currently she notes abdominal pain, dyspnea at rest, and cool extremities. She has no nausea or vomiting.     PAST MEDICAL & SURGICAL HISTORY:  CHF (congestive heart failure): diagnosed in 2013.    Hypothyroid with history of thyroid cancer, s/p thyroidectomy  DM (diabetes mellitus)      Allergies    penicillin (Rash)    Intolerances    MEDICATIONS  (STANDING):  aspirin enteric coated 81 milliGRAM(s) Oral daily  buMETAnide Infusion 1 mG/Hr (10 mL/Hr) IV Continuous <Continuous>  calcitriol   Capsule 0.25 MICROGram(s) Oral at bedtime  calcium acetate 1334 milliGRAM(s) Oral three times a day with meals  calcium carbonate 500 mG (Tums) Chewable 1 Tablet(s) Chew two times a day  calcium gluconate IVPB 2 Gram(s) IV Intermittent three times a day  cholecalciferol 1000 Unit(s) Oral daily  colchicine 0.6 milliGRAM(s) Oral two times a day  dextrose 5%. 1000 milliLiter(s) (50 mL/Hr) IV Continuous <Continuous>  dextrose 50% Injectable 12.5 Gram(s) IV Push once  dextrose 50% Injectable 25 Gram(s) IV Push once  dextrose 50% Injectable 25 Gram(s) IV Push once  DOBUTamine Infusion 2.5 MICROgram(s)/kG/Min (8.527 mL/Hr) IV Continuous <Continuous>  famotidine    Tablet 20 milliGRAM(s) Oral two times a day  heparin  Injectable 5000 Unit(s) SubCutaneous every 12 hours  hydrALAZINE 25 milliGRAM(s) Oral every 8 hours  insulin lispro (HumaLOG) corrective regimen sliding scale   SubCutaneous three times a day before meals  insulin lispro (HumaLOG) corrective regimen sliding scale   SubCutaneous at bedtime  levothyroxine 150 MICROGram(s) Oral daily  magnesium oxide 400 milliGRAM(s) Oral two times a day with meals  sodium chloride 0.9% lock flush 3 milliLiter(s) IV Push every 8 hours    MEDICATIONS  (PRN):  dextrose 40% Gel 15 Gram(s) Oral once PRN Blood Glucose LESS THAN 70 milliGRAM(s)/deciliter  glucagon  Injectable 1 milliGRAM(s) IntraMuscular once PRN Glucose LESS THAN 70 milligrams/deciliter      FAMILY HISTORY:  No pertinent family history in first degree relatives. Her mother is alive in her 80s in good health.     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 She does not smoke cigarettes.    ROS: 10 point review of systems otherwise negative in detail.     Physical Exam:     ICU Vital Signs Last 24 Hrs  T(C): 36 (25 May 2018 15:00), Max: 36.7 (25 May 2018 06:22)  T(F): 96.8 (25 May 2018 15:00), Max: 98 (25 May 2018 06:22)  HR: 73 (25 May 2018 15:00) (73 - 87)  BP: 89/64 (25 May 2018 15:00) (89/64 - 152/87)  BP(mean): 70 (25 May 2018 15:00) (70 - 70)  RR: 16 (25 May 2018 15:00) (16 - 18)  SpO2: 100% (25 May 2018 15:00) (100% - 100%)      General: No distress. Comfortable.  HEENT: EOM intact.  Neck: Neck supple. No masses  Chest: Clear to auscultation bilaterally  CV: Distant heart sounds. Regular rate and rhythm with normal S1 and S2. No murmurs rubs or gallops. Normal pulses. 3+ pitting edema.   Abdomen: Soft, non-distended, non-tender  Skin: No rashes or skin breakdown. Cool skin to touch.   Neurology: Alert and oriented times three. Sensation intact  Psych: Affect normal                          12.1   7.69  )-----------( 185      ( 25 May 2018 07:20 )             35.7     05-25    126<L>  |  82<L>  |  32<H>  ----------------------------<  178<H>  4.2   |  27  |  1.39<H>    Ca    6.9<L>      25 May 2018 07:20  Phos  5.6     05-25  Mg     1.6     05-25    TPro  6.8  /  Alb  3.1<L>  /  TBili  4.5<H>  /  DBili  x   /  AST  61<H>  /  ALT  57<H>  /  AlkPhos  293<H>  05-25 HPI:  Ms. Carson is a 62 year old woman with a past medical history of chronic systolic heart failure that was first diagnosed in 2013. She is s/p Med[employer] CRT-ICD placement in June 2017. She has had multiple admissions for heart failure over the last year. She was admitted last month with acute on chronic systolic HF exacerbation, presenting today with SOB, volume overload, acute on chronic systolic HF exacerbation.  She reports compliance with diet and meds, abdominal bloating, EDDY after a few steps, orthopnea cough with yellow phlegm, substernal chest pain associated with cough.  Denies nausea, vomit chills, diaphoresis,   She has seen her OP Cardiologist once since DC.  In the Ed, the pt was given Lasix 40 mg IV x 1 with positive urine output.     Currently she notes abdominal pain, dyspnea at rest, and cool extremities. She has no nausea or vomiting.     PAST MEDICAL & SURGICAL HISTORY:  CHF (congestive heart failure): diagnosed in 2013.    Hypothyroid with history of thyroid cancer, s/p thyroidectomy  DM (diabetes mellitus)      Allergies    penicillin (Rash)    Intolerances    MEDICATIONS  (STANDING):  aspirin enteric coated 81 milliGRAM(s) Oral daily  buMETAnide Infusion 1 mG/Hr (10 mL/Hr) IV Continuous <Continuous>  calcitriol   Capsule 0.25 MICROGram(s) Oral at bedtime  calcium acetate 1334 milliGRAM(s) Oral three times a day with meals  calcium carbonate 500 mG (Tums) Chewable 1 Tablet(s) Chew two times a day  calcium gluconate IVPB 2 Gram(s) IV Intermittent three times a day  cholecalciferol 1000 Unit(s) Oral daily  colchicine 0.6 milliGRAM(s) Oral two times a day  dextrose 5%. 1000 milliLiter(s) (50 mL/Hr) IV Continuous <Continuous>  dextrose 50% Injectable 12.5 Gram(s) IV Push once  dextrose 50% Injectable 25 Gram(s) IV Push once  dextrose 50% Injectable 25 Gram(s) IV Push once  DOBUTamine Infusion 2.5 MICROgram(s)/kG/Min (8.527 mL/Hr) IV Continuous <Continuous>  famotidine    Tablet 20 milliGRAM(s) Oral two times a day  heparin  Injectable 5000 Unit(s) SubCutaneous every 12 hours  hydrALAZINE 25 milliGRAM(s) Oral every 8 hours  insulin lispro (HumaLOG) corrective regimen sliding scale   SubCutaneous three times a day before meals  insulin lispro (HumaLOG) corrective regimen sliding scale   SubCutaneous at bedtime  levothyroxine 150 MICROGram(s) Oral daily  magnesium oxide 400 milliGRAM(s) Oral two times a day with meals  sodium chloride 0.9% lock flush 3 milliLiter(s) IV Push every 8 hours    MEDICATIONS  (PRN):  dextrose 40% Gel 15 Gram(s) Oral once PRN Blood Glucose LESS THAN 70 milliGRAM(s)/deciliter  glucagon  Injectable 1 milliGRAM(s) IntraMuscular once PRN Glucose LESS THAN 70 milligrams/deciliter      FAMILY HISTORY:  No pertinent family history in first degree relatives. Her mother is alive in her 80s in good health.     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 She does not smoke cigarettes.    ROS: 10 point review of systems otherwise negative in detail.     Physical Exam:     ICU Vital Signs Last 24 Hrs  T(C): 36 (25 May 2018 15:00), Max: 36.7 (25 May 2018 06:22)  T(F): 96.8 (25 May 2018 15:00), Max: 98 (25 May 2018 06:22)  HR: 73 (25 May 2018 15:00) (73 - 87)  BP: 89/64 (25 May 2018 15:00) (89/64 - 152/87)  BP(mean): 70 (25 May 2018 15:00) (70 - 70)  RR: 16 (25 May 2018 15:00) (16 - 18)  SpO2: 100% (25 May 2018 15:00) (100% - 100%)      General: No distress. Comfortable.  HEENT: EOM intact.  Neck: Neck supple. No masses  Chest: Clear to auscultation bilaterally  CV: Distant heart sounds. Regular rate and rhythm with normal S1 and S2. No murmurs rubs or gallops. Normal pulses. 3+ pitting edema.   Abdomen: Soft, non-distended, non-tender  Skin: No rashes or skin breakdown. Cool skin to touch.   Neurology: Alert and oriented times three. Sensation intact  Psych: Affect normal                          12.1   7.69  )-----------( 185      ( 25 May 2018 07:20 )             35.7     05-25    126<L>  |  82<L>  |  32<H>  ----------------------------<  178<H>  4.2   |  27  |  1.39<H>    Ca    6.9<L>      25 May 2018 07:20  Phos  5.6     05-25  Mg     1.6     05-25    TPro  6.8  /  Alb  3.1<L>  /  TBili  4.5<H>  /  DBili  x   /  AST  61<H>  /  ALT  57<H>  /  AlkPhos  293<H>  05-25        < from: TTE with Doppler (w/Cont) (04.26.18 @ 20:04) >  DIMENSIONS:  Dimensions:     Normal Values:  LA:     4.7 cm    2.0 - 4.0 cm  Ao:     3.3 cm    2.0 - 3.8 cm  SEPTUM: 0.9 cm    0.6 - 1.2 cm  PWT:    0.8 cm    0.6 - 1.1 cm  LVIDd:  6.3 cm    3.0 - 5.6 cm  LVIDs:  6.0 cm    1.8 -4.0 cm  Derived Variables:  LVMI: 98 g/m2  RWT: 0.25  Fractional short: 5 %  Ejection Fraction (Teicholtz): 11 %  ------------------------------------------------------------------------  OBSERVATIONS:  Mitral Valve: Mitral annular calcification, otherwise  normal mitral valve. Moderate mitral regurgitation.  Aortic Root: Normal aortic root.  Aortic Valve: Calcified trileaflet aortic valve with  decreased opening.  Left Atrium: Moderately dilated left atrium.  LA volume  index = 47 cc/m2.  Left Ventricle: Severe global left ventricular systolic  dysfunction.  Endocardial visualization enhanced with  intravenous injection of echo contrast (Definity).  No LV  thrombus seen. Mild left ventricular enlargement.  Right Heart: Moderate right atrial enlargement. Right  ventricular enlargement with decreased right ventricular  systolic function.  A device wire is noted in the right  heart. Normal tricuspid valve.  Severe tricuspid  regurgitation. Normal pulmonic valve.  Mild pulmonic  regurgitation.  Pericardium/PleuraNormal pericardium with no pericardial  effusion.  Hemodynamic: Estimated right ventricular systolic pressure  equals 42 mm Hg, assuming right atrial pressure equals 10  mm Hg, consistent with mild pulmonary hypertension.  ------------------------------------------------------------------------  CONCLUSIONS:  1. Mitral annular calcification, otherwise normal mitral  valve. Moderate mitral regurgitation.  2. Moderately dilated left atrium.  LA volume index = 47  cc/m2.  3. Mild left ventricular enlargement.  4. Severe global left ventricular systolic dysfunction.  Endocardial visualization enhanced with intravenous  injection of echo contrast (Definity).  No LV thrombus  seen.  5. Right ventricular enlargement with decreased right  ventricular systolic function.  A device wire is noted in  the right heart.  6. Estimated right ventricular systolic pressure equals 42  mm Hg, assuming right atrial pressure equals 10 mm Hg,  consistent with mild pulmonary hypertension.  7.Normal tricuspid valve.  Severe tricuspid regurgitation.  *** Compared with echocardiogram of 5/16/2017, left  ventricular systolic function has deteriorated.  ------------------------------------------------------------------------  Confirmed on  4/26/2018 - 13:50:18 by Xander Tyson M.D.  ------------------------------------------------------------------------    < end of copied text >

## 2018-05-25 NOTE — PROGRESS NOTE ADULT - SUBJECTIVE AND OBJECTIVE BOX
SUBJECTIVE:   No CP, Palps, syncope.  c/o abdominal discomfort since yesterday evening when Bumex gtt initiated with one episode of diarrhea overnight and +flatus this morning  patient refusing to continue with Bumex gtt  given abd pain  She is agreeable to [facility] today  She diuresed 700cc overnight        MEDICATIONS  (STANDING):  aspirin enteric coated 81 milliGRAM(s) Oral daily  buMETAnide Infusion 1 mG/Hr (10 mL/Hr) IV Continuous <Continuous>  calcitriol   Capsule 0.25 MICROGram(s) Oral at bedtime  calcium acetate 1334 milliGRAM(s) Oral three times a day with meals  calcium carbonate 500 mG (Tums) Chewable 1 Tablet(s) Chew two times a day  calcium gluconate IVPB 2 Gram(s) IV Intermittent three times a day  carvedilol 3.125 milliGRAM(s) Oral every 12 hours  cholecalciferol 1000 Unit(s) Oral daily  colchicine 0.6 milliGRAM(s) Oral two times a day  dextrose 5%. 1000 milliLiter(s) (50 mL/Hr) IV Continuous <Continuous>  dextrose 50% Injectable 12.5 Gram(s) IV Push once  dextrose 50% Injectable 25 Gram(s) IV Push once  dextrose 50% Injectable 25 Gram(s) IV Push once  famotidine    Tablet 20 milliGRAM(s) Oral two times a day  heparin  Injectable 5000 Unit(s) SubCutaneous every 12 hours  insulin lispro (HumaLOG) corrective regimen sliding scale   SubCutaneous three times a day before meals  insulin lispro (HumaLOG) corrective regimen sliding scale   SubCutaneous at bedtime  levothyroxine 150 MICROGram(s) Oral daily  lisinopril 10 milliGRAM(s) Oral daily  magnesium oxide 400 milliGRAM(s) Oral two times a day with meals  sodium chloride 0.9% lock flush 3 milliLiter(s) IV Push every 8 hours    MEDICATIONS  (PRN):  dextrose 40% Gel 15 Gram(s) Oral once PRN Blood Glucose LESS THAN 70 milliGRAM(s)/deciliter  glucagon  Injectable 1 milliGRAM(s) IntraMuscular once PRN Glucose LESS THAN 70 milligrams/deciliter      LABS:                        12.1   7.69  )-----------( 185      ( 25 May 2018 07:20 )             35.7     Hemoglobin: 12.1 g/dL (05-25 @ 07:20)  Hemoglobin: 11.8 g/dL (05-24 @ 07:15)  Hemoglobin: 12.3 g/dL (05-23 @ 15:40)    05-25    126<L>  |  82<L>  |  32<H>  ----------------------------<  178<H>  4.2   |  27  |  1.39<H>    Ca    6.9<L>      25 May 2018 07:20  Phos  5.6     05-25  Mg     1.6     05-25    TPro  6.8  /  Alb  3.1<L>  /  TBili  4.5<H>  /  DBili  x   /  AST  61<H>  /  ALT  57<H>  /  AlkPhos  293<H>  05-25    Creatinine Trend: 1.39<--, 1.54<--, 1.42<--, 1.26<--, 1.22<--     CARDIAC MARKERS ( 24 May 2018 07:15 )  x     / < 0.06 ng/mL / 500 u/L / x     / x      CARDIAC MARKERS ( 24 May 2018 01:20 )  x     / < 0.06 ng/mL / 483 u/L / x     / x      CARDIAC MARKERS ( 23 May 2018 15:40 )  x     / < 0.06 ng/mL / 559 u/L / 3.13 ng/mL / x            PHYSICAL EXAM  Vital Signs Last 24 Hrs  T(C): 36.7 (25 May 2018 06:22), Max: 36.7 (25 May 2018 06:22)  T(F): 98 (25 May 2018 06:22), Max: 98 (25 May 2018 06:22)  HR: 82 (25 May 2018 06:22) (77 - 87)  BP: 122/87 (25 May 2018 06:22) (99/64 - 152/87)  BP(mean): --  RR: 18 (25 May 2018 06:22) (18 - 18)  SpO2: 100% (25 May 2018 06:22) (99% - 100%)              Cardiovascular:  S1S2 RRR, No JVD  Respiratory: Lungs clear to auscultation, normal effort  Gastrointestinal: Abdomen soft, ND, NT, +BS + ascites and edema  Skin: Warm, dry, intact. No rash.  Musculoskeletal: Normal ROM, normal strength  Ext: No C/C/E B/L LE 3+     DIAGNOSTIC DATA  TELEMETRY: SR/    < from: Cardiac Cath Lab (01.08.14 @ 16:49) >  VENTRICLES: No LV gram was performed; however, a recent echocardiogram  demonstrated an EF of 25 %.  CORONARY VESSELS: The coronary circulation is co-dominant.  LM:   -- LM: Normal.  LAD:   --  LAD: Normal.  CX:   --  Circumflex: Normal.  RCA:   --  RCA: Normal.  COMPLICATIONS: There were no complications.  SUMMARY:  HEMODYNAMICS: Hemodynamic assessment demonstrates no systemic hypertension,  severely elevated LVEDP, borderline low cardiac output, and markedly  elevated pulmonary capillary wedge pressure.  DIAGNOSTIC RECOMMENDATIONS: The patient's diuretic regimen should be  carefully increased. Patient management should include aggressive medical  therapy. The patient should follow a low fat and low calorie diet. Medical  management is recommended.  Prepared and signed by  Anni Leos M.D.  Signed 01/10/2014 20:19:01    < end of copied text >    < from: TTE with Doppler (w/Cont) (04.26.18 @ 20:04) >  CONCLUSIONS:  1. Mitral annular calcification, otherwise normal mitral  valve. Moderate mitral regurgitation.  2. Moderately dilated left atrium.  LA volume index = 47  cc/m2.  3. Mild left ventricular enlargement.  4. Severe global left ventricular systolic dysfunction.  Endocardial visualization enhanced with intravenous  injection of echo contrast (Definity).  No LV thrombus  seen.  5. Right ventricular enlargement with decreased right  ventricular systolic function.  A device wire is noted in  the right heart.  6. Estimated right ventricular systolic pressure equals 42  mm Hg, assuming right atrial pressure equals 10 mm Hg,  consistent with mild pulmonary hypertension.  7.Normal tricuspid valve.  Severe tricuspid regurgitation.  *** Compared with echocardiogram of 5/16/2017, left  ventricular systolic function has deteriorated.  ------------------------------------------------------------------------  Confirmed on  4/26/2018 - 13:50:18 by Xander Tyson M.D.    < end of copied text >    LAST INTERROGATION 4/19/18: Underlying Rhythm:   Normal sinus rhythm 75 b/min 1st degree AVB    Events/Observation:   One episode of NSVT @ 214 b/min x 8 beats on 4/18/2018.  No atrial tachyarrhythmias. Optivol fluid level increased beyond the threshold since March 1, 2018.  Total BiV pacing 99%                Impression/Plan:  Normal ICD function.  Normal sensing and pacing via iterative testing. Good battery status. Excellent threshold capture.  No reprogramming.      RADIOLOGY:    < from: Xray Chest 1 View- PORTABLE-Urgent (05.23.18 @ 14:41) >  IMPRESSION:  Clear lungs. No pleural effusions or pneumothorax.    Stable left chest wall biventricular AICD and marked cardiomegaly.     Trachea midline.    Unremarkable osseous structures.    < end of copied text >    ASSESSMENT AND PLAN:  62 year old Female with past medical history of hypertension, hyperlipidemia, chronic RBBB, obesity, thyroid CA s/p thyroidectomy, hypocalcemia (managed by OP endo Dr murray), nonischemic cardiomyopathy with an ejection fraction of 11% and  normal coronaries on left heart cardiac catheterization 2014 , s/p [employer] Claria MRI CRT-ICD placement in June 2017, admitted last month with acute on chronic systolic HF exacerbation, presenting today with SOB, volume overload, acute on chronic systolic HF exacerbation    --Last interrogation noted 99% BIV pacing with normal function, will review that her BIVICD is optimized  --needs aggressive diuresis- for RHC today  -- no further EP work up necessary at this time     Johanne COTE

## 2018-05-25 NOTE — PROGRESS NOTE ADULT - ASSESSMENT
Patient is a 61 y/o F with a PMH significant for severe NICM s/p MDT AICD (EF 11%), HTN, DM, history of thyroid CA, and obesity who was admitted with acute on chronic systolic heart failure and transferred to the CCU for further management of cardiogenic shock with inotrope assisted diuresis.

## 2018-05-25 NOTE — PROGRESS NOTE ADULT - ASSESSMENT
62yFemale with severe NICM s/p MDT AICD, HTN, DM, history of thyroid CA, obesity admitted with acute on chronic systolic heart failure.      Problem/Plan - 1:  ·  Problem: Acute on chronic systolic congestive heart failure with severe Cardiomyopathy S/P AICD  .  Plan: S/P RHC. Will be transferred to CCU for Dobutamine and Diuresis . Heart Failure team to follow.      Problem/Plan - 2:  ·  Problem: Essential hypertension.  Plan: BP readings fine. Low salt diet.  Continue BP meds.      Problem/Plan - 3:  ·  Problem: Diabetes mellitus, type 2.  Plan: Sugars in acceptable range .   FS QID  HISS   DM diet.      Problem/Plan - 4:  ·  Problem: Asthma.  Plan: Currently stable.   Not on any medications.      Problem/Plan - 5:  ·  Problem: Hypocalcemia .  Plan :Replacing. Renal helping.      Problem/Plan -6:  ·  Problem: Abnormal LFT  .  Plan : Secondary to Hepatic congestion from CHF . GI consulted.      Problem/Plan -7:  ·  Problem: MAGNUS  .  Plan :Renal helping.       Problem/Plan -8:  Problem: Need for prophylactic measure. Plan: VTE with Heparin 5000U sub cut BID.  Fall, Aspirations and safety precautions,

## 2018-05-25 NOTE — PROGRESS NOTE ADULT - PROBLEM SELECTOR PLAN 2
- patient with NICM, s/p AICD placement in June 2017, EF of 11%  - patient will likely need long term management with LVAD or heart transplant  - continue current management as outlined above  - coreg and lisinopril discontinued for now given initiation of inotropic support

## 2018-05-25 NOTE — PROGRESS NOTE ADULT - SUBJECTIVE AND OBJECTIVE BOX
Patient denies CP, Breathin g a little better.  S/p cath Review of systems otherwise (-)  	  MEDICATIONS:  MEDICATIONS  (STANDING):  aspirin enteric coated 81 milliGRAM(s) Oral daily  buMETAnide Infusion 1 mG/Hr (10 mL/Hr) IV Continuous <Continuous>  calcitriol   Capsule 0.25 MICROGram(s) Oral at bedtime  calcium acetate 1334 milliGRAM(s) Oral three times a day with meals  calcium carbonate 500 mG (Tums) Chewable 1 Tablet(s) Chew two times a day  calcium gluconate IVPB 2 Gram(s) IV Intermittent three times a day  carvedilol 3.125 milliGRAM(s) Oral every 12 hours  cholecalciferol 1000 Unit(s) Oral daily  colchicine 0.6 milliGRAM(s) Oral two times a day  dextrose 5%. 1000 milliLiter(s) (50 mL/Hr) IV Continuous <Continuous>  dextrose 50% Injectable 12.5 Gram(s) IV Push once  dextrose 50% Injectable 25 Gram(s) IV Push once  dextrose 50% Injectable 25 Gram(s) IV Push once  famotidine    Tablet 20 milliGRAM(s) Oral two times a day  heparin  Injectable 5000 Unit(s) SubCutaneous every 12 hours  insulin lispro (HumaLOG) corrective regimen sliding scale   SubCutaneous three times a day before meals  insulin lispro (HumaLOG) corrective regimen sliding scale   SubCutaneous at bedtime  levothyroxine 150 MICROGram(s) Oral daily  lisinopril 10 milliGRAM(s) Oral daily  magnesium oxide 400 milliGRAM(s) Oral two times a day with meals  sodium chloride 0.9% lock flush 3 milliLiter(s) IV Push every 8 hours      LABS:	 	    CARDIAC MARKERS:  CARDIAC MARKERS ( 24 May 2018 07:15 )  x     / < 0.06 ng/mL / 500 u/L / x     / x      CARDIAC MARKERS ( 24 May 2018 01:20 )  x     / < 0.06 ng/mL / 483 u/L / x     / x      CARDIAC MARKERS ( 23 May 2018 15:40 )  x     / < 0.06 ng/mL / 559 u/L / 3.13 ng/mL / x                                    12.1   7.69  )-----------( 185      ( 25 May 2018 07:20 )             35.7     Hemoglobin: 12.1 g/dL ( @ 07:20)  Hemoglobin: 11.8 g/dL ( @ 07:15)  Hemoglobin: 12.3 g/dL ( @ 15:40)          126<L>  |  82<L>  |  32<H>  ----------------------------<  178<H>  4.2   |  27  |  1.39<H>    Ca    6.9<L>      25 May 2018 07:20  Phos  5.6       Mg     1.6         TPro  6.8  /  Alb  3.1<L>  /  TBili  4.5<H>  /  DBili  x   /  AST  61<H>  /  ALT  57<H>  /  AlkPhos  293<H>      Creatinine Trend: 1.39<--, 1.54<--, 1.42<--, 1.26<--, 1.22<--    COAGS:       proBNP: Serum Pro-Brain Natriuretic Peptide: 1996 pg/mL ( @ 07:20)    Lipid Profile:   HgA1c:   TSH:       PHYSICAL EXAM:  T(C): 36 (18 @ 15:00), Max: 36.7 (18 @ 06:22)  HR: 73 (18 @ 15:00) (73 - 87)  BP: 89/64 (18 @ 15:00) (89/64 - 152/87)  RR: 16 (18 @ 15:00) (16 - 18)  SpO2: 100% (18 @ 15:00) (100% - 100%)  Wt(kg): --  I&O's Summary    24 May 2018 07:  -  25 May 2018 07:00  --------------------------------------------------------  IN: 450 mL / OUT: 1150 mL / NET: -700 mL    25 May 2018 07:  -  25 May 2018 15:58  --------------------------------------------------------  IN: 0 mL / OUT: 100 mL / NET: -100 mL      Height (cm): 170.18 ( @ 15:00)  Weight (kg): 113.7 ( @ 15:00)  BMI (kg/m2): 39.3 ( @ 15:00)  BSA (m2): 2.23 ( @ 15:00)    HEENT:   Normal oral mucosa, PERRL, EOMI	  Lymphatic: No obvious lymphadenopathy , (+) edema  Cardiovascular: Normal S1 S2, No JVD, 1/6 ROSALINDA murmur, Peripheral pulses palpable 2+ bilaterally  Respiratory: Lungs clear to auscultation, normal effort 	  Gastrointestinal:  Soft, Non-tender, + BS	  Skin: No rashes,  No cyanosis, warm to touch  Musculoskeletal: Normal range of motion, normal strength  Psychiatry:  Appropriate Mood & affect     TELEMETRY: Vpaced	         ASSESSMENT/PLAN: 	62y Female  with severe NICM s/p MDT AICD, HTN, DM, history of thyroid CA, obesity   Admitted with acute on chronic systolic heart failure.     - S/P cath revealing low cardiac index.  Admitted to CCU.  Start  2.5 mcg/kg/Min  - D/C coreg  - hkeep net negative  - cont tele  - consultants appreciated    Augusto Grimes MD, [facility]

## 2018-05-25 NOTE — PROGRESS NOTE ADULT - SUBJECTIVE AND OBJECTIVE BOX
INTERVAL HPI/OVERNIGHT EVENTS: S/P Rt Heart cath and swan   Vital Signs Last 24 Hrs  T(C): 36.7 (25 May 2018 06:22), Max: 36.7 (25 May 2018 06:22)  T(F): 98 (25 May 2018 06:22), Max: 98 (25 May 2018 06:22)  HR: 82 (25 May 2018 06:22) (77 - 87)  BP: 122/87 (25 May 2018 06:22) (107/84 - 152/87)  BP(mean): --  RR: 18 (25 May 2018 06:22) (18 - 18)  SpO2: 100% (25 May 2018 06:22) (100% - 100%)  I&O's Summary    24 May 2018 07:01  -  25 May 2018 07:00  --------------------------------------------------------  IN: 450 mL / OUT: 1150 mL / NET: -700 mL    25 May 2018 07:01  -  25 May 2018 14:17  --------------------------------------------------------  IN: 0 mL / OUT: 100 mL / NET: -100 mL      MEDICATIONS  (STANDING):  aspirin enteric coated 81 milliGRAM(s) Oral daily  buMETAnide Infusion 1 mG/Hr (10 mL/Hr) IV Continuous <Continuous>  calcitriol   Capsule 0.25 MICROGram(s) Oral at bedtime  calcium acetate 1334 milliGRAM(s) Oral three times a day with meals  calcium carbonate 500 mG (Tums) Chewable 1 Tablet(s) Chew two times a day  calcium gluconate IVPB 2 Gram(s) IV Intermittent three times a day  carvedilol 3.125 milliGRAM(s) Oral every 12 hours  cholecalciferol 1000 Unit(s) Oral daily  colchicine 0.6 milliGRAM(s) Oral two times a day  dextrose 5%. 1000 milliLiter(s) (50 mL/Hr) IV Continuous <Continuous>  dextrose 50% Injectable 12.5 Gram(s) IV Push once  dextrose 50% Injectable 25 Gram(s) IV Push once  dextrose 50% Injectable 25 Gram(s) IV Push once  famotidine    Tablet 20 milliGRAM(s) Oral two times a day  heparin  Injectable 5000 Unit(s) SubCutaneous every 12 hours  insulin lispro (HumaLOG) corrective regimen sliding scale   SubCutaneous three times a day before meals  insulin lispro (HumaLOG) corrective regimen sliding scale   SubCutaneous at bedtime  levothyroxine 150 MICROGram(s) Oral daily  lisinopril 10 milliGRAM(s) Oral daily  magnesium oxide 400 milliGRAM(s) Oral two times a day with meals  sodium chloride 0.9% lock flush 3 milliLiter(s) IV Push every 8 hours    MEDICATIONS  (PRN):  dextrose 40% Gel 15 Gram(s) Oral once PRN Blood Glucose LESS THAN 70 milliGRAM(s)/deciliter  glucagon  Injectable 1 milliGRAM(s) IntraMuscular once PRN Glucose LESS THAN 70 milligrams/deciliter    LABS:                        12.1   7.69  )-----------( 185      ( 25 May 2018 07:20 )             35.7     05-25    126<L>  |  82<L>  |  32<H>  ----------------------------<  178<H>  4.2   |  27  |  1.39<H>    Ca    6.9<L>      25 May 2018 07:20  Phos  5.6     05-25  Mg     1.6     05-25    TPro  6.8  /  Alb  3.1<L>  /  TBili  4.5<H>  /  DBili  x   /  AST  61<H>  /  ALT  57<H>  /  AlkPhos  293<H>  05-25    PTT - ( 23 May 2018 15:40 )  PTT:29.2 SEC    CAPILLARY BLOOD GLUCOSE      POCT Blood Glucose.: 174 mg/dL (25 May 2018 08:44)  POCT Blood Glucose.: 132 mg/dL (24 May 2018 21:58)  POCT Blood Glucose.: 125 mg/dL (24 May 2018 17:42)          REVIEW OF SYSTEMS:  CONSTITUTIONAL: No fever, weight loss, or fatigue  EYES: No eye pain, visual disturbances, or discharge  ENMT:  No difficulty hearing, tinnitus, vertigo; No sinus or throat pain  NECK: No pain or stiffness  BREASTS: No pain, masses, or nipple discharge  RESPIRATORY: No cough, wheezing, chills or hemoptysis;  shortness of breath  CARDIOVASCULAR:  leg swelling  GASTROINTESTINAL: No abdominal or epigastric pain. No nausea, vomiting, or hematemesis; No diarrhea or constipation. No melena or hematochezia.  GENITOURINARY: No dysuria, frequency, hematuria, or incontinence  NEUROLOGICAL: No headaches, memory loss, loss of strength, numbness, or tremors  SKIN: No itching, burning, rashes, or lesions     Consultant(s) Notes Reviewed:  [x ] YES  [ ] NO    PHYSICAL EXAM:  GENERAL: NAD, well-groomed, well-developed,not in any distress ,  HEAD:  Atraumatic, Normocephalic  EYES: EOMI, PERRLA, conjunctiva and sclera clear  ENMT: No tonsillar erythema, exudates, or enlargement; Moist mucous membranes, Good dentition, No lesions  NECK: Supple, No JVD, Normal thyroid  NERVOUS SYSTEM:  Alert & Oriented X3, No focal deficit   CHEST/LUNG: Good air entry bilateral with no  rales, rhonchi, wheezing, or rubs  HEART: Regular rate and rhythm; No murmurs, rubs, or gallops  ABDOMEN: Soft, Nontender, Nondistended; Bowel sounds present  EXTREMITIES:  2+ Peripheral Pulses, No clubbing, cyanosis, but 3+ edema  SKIN: No rashes or lesions    Care Discussed with Consultants/Other Providers [ x] YES  [ ] NO

## 2018-05-25 NOTE — PROGRESS NOTE ADULT - ASSESSMENT
The patient is a deidre 62-year-old female with past medical history of hypertension, diabetes, thyroid cancer, with presumed removal of parathyroid gland, congestive nonischemic cardiomyopathy, congestive heart failure, and pulmonary hypertension who presents with shortness of breath.  She is grossly volume overloaded.  The patient has hypervolemic hyponatremia likely secondary to heart failure.  She also has hypocalcemia likely secondary to the fact that she likely has no parathyroid gland.  Acute decompensated heart failure    1.	Endocrine.  Cont IV calcium 2 g q. 8 h. times three doses.  She can continue p.o. calcium supplementation and rocaltrol.  2.	Renal.   Bumex 1 mg gtt and  gtt.  Cold and wet at present.   She is grossly volume overloaded.  Strict ins and outs  3.	Gastrointestinal.  Will trend LFT.  This is likely  congestive hepatic pathology.

## 2018-05-25 NOTE — PROGRESS NOTE ADULT - SUBJECTIVE AND OBJECTIVE BOX
CCU ACCEPT NOTE    HPI:  62F with a past medical history of hypertension, hyperlipidemia, chronic RBBB,  obesity, thyroid CA, nonischemic cardiomyopathy with an ejection fraction of 11% and normal coronaries on recent cardiac catheterization at [facility] with Dr Chavarria , s/p Medtronic Sofi MRI CRT-ICD placement in 2017, admitted last month with acute on chronic systolic HF exacerbation, presenting today with SOB, volume overload, acute on chronic systolic HF exacerbation.  She reports compliance with diet and meds, abdominal bloating, EDDY after a few steps, orthopnea cough with yellow phlegm, substernal chest pain associated with cough.  Denies nausea, vomit chills, diaphoresis,   She has seen her OP Cardiologist once since DC.  In the Ed, the pt was given Lasix 40 mg IV x 1 with positive urine output. (23 May 2018 19:57)    Once hospitalized, the patient was initially on bumex pushes and then transitioned to a bumex drip with minimal improvement in her urine output. She underwent a RHC on  which was significant for both elevated left and right sided pressures with a CO of 2.9 with a CI of 1.318. She was then transferred to the CCU for inotrope assisted diuresis.         Allergy:  Allergies    penicillin (Rash)    Intolerances        Medications:  MEDICATIONS  (STANDING):  aspirin enteric coated 81 milliGRAM(s) Oral daily  buMETAnide Infusion 1 mG/Hr (10 mL/Hr) IV Continuous <Continuous>  calcitriol   Capsule 0.25 MICROGram(s) Oral at bedtime  calcium acetate 1334 milliGRAM(s) Oral three times a day with meals  calcium carbonate 500 mG (Tums) Chewable 1 Tablet(s) Chew two times a day  cholecalciferol 1000 Unit(s) Oral daily  colchicine 0.6 milliGRAM(s) Oral two times a day  dextrose 5%. 1000 milliLiter(s) (50 mL/Hr) IV Continuous <Continuous>  dextrose 50% Injectable 12.5 Gram(s) IV Push once  dextrose 50% Injectable 25 Gram(s) IV Push once  dextrose 50% Injectable 25 Gram(s) IV Push once  DOBUTamine Infusion 2.5 MICROgram(s)/kG/Min (8.527 mL/Hr) IV Continuous <Continuous>  famotidine    Tablet 20 milliGRAM(s) Oral two times a day  heparin  Injectable 5000 Unit(s) SubCutaneous every 12 hours  hydrALAZINE 10 milliGRAM(s) Oral every 8 hours  insulin lispro (HumaLOG) corrective regimen sliding scale   SubCutaneous three times a day before meals  insulin lispro (HumaLOG) corrective regimen sliding scale   SubCutaneous at bedtime  levothyroxine 150 MICROGram(s) Oral daily  magnesium oxide 400 milliGRAM(s) Oral two times a day with meals  sodium chloride 0.9% lock flush 3 milliLiter(s) IV Push every 8 hours    MEDICATIONS  (PRN):  dextrose 40% Gel 15 Gram(s) Oral once PRN Blood Glucose LESS THAN 70 milliGRAM(s)/deciliter  glucagon  Injectable 1 milliGRAM(s) IntraMuscular once PRN Glucose LESS THAN 70 milligrams/deciliter      PMH/PSH/FH/SH: [ ] Unchanged    Vitals:  T(C): 35.7 (18 @ 17:00), Max: 36.7 (18 @ 06:22)  HR: 73 (18 @ 17:07) (64 - 87)  BP: 92/76 (18 @ 17:00) (89/64 - 122/87)  BP(mean): 79 (18 @ 17:00) (70 - 88)  RR: 17 (18 @ 17:07) (13 - 19)  SpO2: 100% (18 @ 17:07) (99% - 100%)  Wt(kg): --  Daily Height in cm: 170.18 (25 May 2018 15:00)    Daily Weight in k.7 (25 May 2018 15:00)  I&O's Summary    24 May 2018 07:  -  25 May 2018 07:00  --------------------------------------------------------  IN: 450 mL / OUT: 1150 mL / NET: -700 mL    25 May 2018 07:  -  25 May 2018 18:24  --------------------------------------------------------  IN: 344 mL / OUT: 925 mL / NET: -581 mL        Labs:                        12.1   7.69  )-----------( 185      ( 25 May 2018 07:20 )             35.7     05-25    126<L>  |  82<L>  |  32<H>  ----------------------------<  178<H>  4.2   |  27  |  1.39<H>    Ca    6.9<L>      25 May 2018 07:20  Phos  5.6       Mg     1.6         TPro  6.8  /  Alb  3.1<L>  /  TBili  4.5<H>  /  DBili  x   /  AST  61<H>  /  ALT  57<H>  /  AlkPhos  293<H>        CARDIAC MARKERS ( 24 May 2018 07:15 )  x     / < 0.06 ng/mL / 500 u/L / x     / x      CARDIAC MARKERS ( 24 May 2018 01:20 )  x     / < 0.06 ng/mL / 483 u/L / x     / x          Serum Pro-Brain Natriuretic Peptide: 1996 pg/mL ( @ 07:20)  Phosphorus Level, Serum: 5.6 mg/dL ( @ 07:20)  Magnesium, Serum: 1.6 mg/dL ( @ 07:20)            Physical Exam:  Appearance: [X ] Normal  [ ] abnormal [ ] NAD   Eyes: [ ] PERRL [ ] EOMI  HENT: [ ] Normal [X ] Abnormal oral mucosa [ ]NC/AT  Cardiovascular: [ X] S1 [ X] S2 [ X] RRR [ ] m/r/g [X ] pitting edema in LE bilaterally  [ ] JVP  Procedural Access Site: [ ]  hematoma [ ] tender to palpation [ ] 2+ pulse [ ] bruit [ ] Ecchymosis  Respiratory: [X ] Clear to auscultation bilaterally  Gastrointestinal: [X ] Soft [ ] tenderness[ ] distension [X ] BS  Musculoskeletal: [ ] clubbing [ ] joint deformity   Neurologic: [ ] Non-focal  Lymphatic: [ ] lymphadenopathy  Psychiatry: [X ] AAOx3  [ ] confused [ ] disoriented [ ] Mood & affect appropriate  Skin: [ ]  rashes [ ] ecchymoses [ ] cyanosis [X ] cool to touch        < from: TTE with Doppler (w/Cont) (.18 @ 20:04) >  CONCLUSIONS:  1. Mitral annular calcification, otherwise normal mitral  valve. Moderate mitral regurgitation.  2. Moderately dilated left atrium.  LA volume index = 47  cc/m2.  3. Mild left ventricular enlargement.  4. Severe global left ventricular systolic dysfunction.  Endocardial visualization enhanced with intravenous  injection of echo contrast (Definity).  No LV thrombus  seen.  5. Right ventricular enlargement with decreased right  ventricular systolic function.  A device wire is noted in  the right heart.  6. Estimated right ventricular systolic pressure equals 42  mm Hg, assuming right atrial pressure equals 10 mm Hg,  consistent with mild pulmonary hypertension.  7.Normal tricuspid valve.  Severe tricuspid regurgitation.  *** Compared with echocardiogram of 2017, left  ventricular systolic function has deteriorated.    < end of copied text >

## 2018-05-25 NOTE — CHART NOTE - NSCHARTNOTEFT_GEN_A_CORE
CCU Fellow Chart Update    Saw pt at bedside. Pt was laying at 30 degrees, breathing comfortably on NC, her lungs were clear anteriorly, abd distended, LE w/ significant edema and cool to touch. Repeat Spelter numbers: CVP:27, PA:43/31, PCWP:31, PA Sat:47.2 (up from 35.6), CO:2.4, CO:1 (Salvador), B: 107/78 (85), HR 80s. The patient's UO for the past 4hrs has been 325, 150, 140, 70. The Bumex gtt @ 1mg/hr was only hung at 8pm per CCU Nurse. The patient is also on  @ 2.5. Given current hemodynamics will increase  from 2.5 to 5 and give 3mg of IV Bumex. d/w HF Attending.  Repeat Spelter numbers and full labs frequently overnight.    Shanda  (p): [phone]

## 2018-05-25 NOTE — CONSULT NOTE ADULT - SUBJECTIVE AND OBJECTIVE BOX
Chief Complaint:  Patient is a 62y old  Female who presents with a chief complaint of Body swelling x 4 weeks (23 May 2018 19:57)    Asthma  CHF (congestive heart failure)  DM (diabetes mellitus)  HTN (hypertension)  Thyroid ca  S/P thyroidectomy     HPI:  62F with a past medical history of hypertension, hyperlipidemia, chronic RBBB,  obesity, thyroid CA, nonischemic cardiomyopathy with an ejection fraction of 11% and normal coronaries on recent cardiac catheterization at [facility] with Dr Chavarria , s/p Medtronic Claria MRI CRT-ICD placement in 2017, admitted last month with acute on chronic systolic HF exacerbation, presenting today with SOB, volume overload, acute on chronic systolic HF exacerbation.  She reports compliance with diet and meds, abdominal bloating, EDDY after a few steps, orthopnea cough with yellow phlegm, substernal chest pain associated with cough.  Denies nausea, vomit chills, diaphoresis,   She has seen her OP Cardiologist once since DC.  In the Ed, the pt was given Lasix 40 mg IV x 1 with positive urine output. GI consulted for increased lfts with abdominal discomfort. The patient reports that at home she rarely has any abdominal pain, just has c/o distention home without pain. She had regular bowel movements at home that are formed and brown in color; no melena or brbpr. She continues to have a good appetite and is without dysphagia or odynophagia. Since being in the hospital she was given a dose of Bumex which she reports she instantly felt increased abdominal cramping and increased pain diffusely and that she felt flushed and with chills. Since then her pain has subsided.         penicillin (Rash)      aspirin enteric coated 81 milliGRAM(s) Oral daily  buMETAnide Infusion 1 mG/Hr IV Continuous <Continuous>  calcitriol   Capsule 0.25 MICROGram(s) Oral at bedtime  calcium acetate 1334 milliGRAM(s) Oral three times a day with meals  calcium carbonate 500 mG (Tums) Chewable 1 Tablet(s) Chew two times a day  calcium gluconate IVPB 2 Gram(s) IV Intermittent three times a day  carvedilol 3.125 milliGRAM(s) Oral every 12 hours  cholecalciferol 1000 Unit(s) Oral daily  colchicine 0.6 milliGRAM(s) Oral two times a day  dextrose 40% Gel 15 Gram(s) Oral once PRN  dextrose 5%. 1000 milliLiter(s) IV Continuous <Continuous>  dextrose 50% Injectable 12.5 Gram(s) IV Push once  dextrose 50% Injectable 25 Gram(s) IV Push once  dextrose 50% Injectable 25 Gram(s) IV Push once  famotidine    Tablet 20 milliGRAM(s) Oral two times a day  glucagon  Injectable 1 milliGRAM(s) IntraMuscular once PRN  heparin  Injectable 5000 Unit(s) SubCutaneous every 12 hours  insulin lispro (HumaLOG) corrective regimen sliding scale   SubCutaneous three times a day before meals  insulin lispro (HumaLOG) corrective regimen sliding scale   SubCutaneous at bedtime  levothyroxine 150 MICROGram(s) Oral daily  lisinopril 10 milliGRAM(s) Oral daily  magnesium oxide 400 milliGRAM(s) Oral two times a day with meals  sodium chloride 0.9% lock flush 3 milliLiter(s) IV Push every 8 hours        FAMILY HISTORY:  No pertinent family history in first degree relatives        Review of Systems:    General:  No wt loss, fevers, chills, night sweats, fatigue  Eyes:  Good vision, no reported pain  ENT:  No sore throat, pain, runny nose, dysphagia  CV:  No pain, palpitations, no lightheadedness  Resp:  No dyspnea, cough, tachypnea, wheezing  GI: as above  :  No pain, bleeding, incontinence, nocturia  Muscle:  No pain, weakness  Neuro:  No weakness, tingling, memory problems  Psych:  No fatigue, insomnia, mood problems, depression  Endocrine:  No polyuria, polydypsia, cold/heat intolerance  Heme:  No petechiae, ecchymosis, easy bruisability  Skin:  No rash, tattoos, scars, edema    Relevant Family History:   n/c    Relevant Social History: n/c      Physical Exam:    Vital Signs:  Vital Signs Last 24 Hrs  T(C): 36.7 (25 May 2018 06:22), Max: 36.7 (25 May 2018 06:22)  T(F): 98 (25 May 2018 06:22), Max: 98 (25 May 2018 06:22)  HR: 82 (25 May 2018 06:22) (77 - 87)  BP: 122/87 (25 May 2018 06:22) (99/64 - 152/87)  BP(mean): --  RR: 18 (25 May 2018 06:22) (18 - 18)  SpO2: 100% (25 May 2018 06:22) (99% - 100%)  Daily     Daily Weight in k (25 May 2018 07:54)    General:  Appears stated age, well-groomed, nad  HEENT:  NC/AT,  conjunctivae clear and pink, no thyromegaly, nodules, adenopathy, no JVD; +icterus  Chest:  Full & symmetric excursion, no increased effort, breath sounds clear  Cardiovascular:  Regular rhythm, S1, S2, no murmur/rub/S3/S4, no abdominal bruit, no edema  Abdomen:  Soft, non-tender, non-distended, normoactive bowel sounds,  no masses ,no hepatosplenomeagaly, no signs of chronic liver disease  Extremities:  no cyanosis,clubbing or edema  Skin:  No rash/erythema/ecchymoses/petechiae/wounds/abscess/warm/dry  Neuro/Psych:  A&Ox3 , no asterixis, no tremor, no encephalopathy    Laboratory:                            12.1   7.69  )-----------( 185      ( 25 May 2018 07:20 )             35.7     05-25    126<L>  |  82<L>  |  32<H>  ----------------------------<  178<H>  4.2   |  27  |  1.39<H>    Ca    6.9<L>      25 May 2018 07:20  Phos  5.6     05-25  Mg     1.6     05-25    TPro  6.8  /  Alb  3.1<L>  /  TBili  4.5<H>  /  DBili  x   /  AST  61<H>  /  ALT  57<H>  /  AlkPhos  293<H>  05-25    LIVER FUNCTIONS - ( 25 May 2018 07:20 )  Alb: 3.1 g/dL / Pro: 6.8 g/dL / ALK PHOS: 293 u/L / ALT: 57 u/L / AST: 61 u/L / GGT: x           PTT - ( 23 May 2018 15:40 )  PTT:29.2 SEC      Imaging:

## 2018-05-25 NOTE — PROGRESS NOTE ADULT - PROBLEM SELECTOR PLAN 5
- patient with hypocalcemia that is being managed by renal  - continue supplementation as needed based on daily labs

## 2018-05-25 NOTE — PROGRESS NOTE ADULT - SUBJECTIVE AND OBJECTIVE BOX
NEPHROLOGY-[facility] [phone]        Patient seen and examined in bed.  She is on  gtt and Bumex        MEDICATIONS  (STANDING):  aspirin enteric coated 81 milliGRAM(s) Oral daily  buMETAnide Infusion 1 mG/Hr (10 mL/Hr) IV Continuous <Continuous>  calcitriol   Capsule 0.25 MICROGram(s) Oral at bedtime  calcium acetate 1334 milliGRAM(s) Oral three times a day with meals  calcium carbonate 500 mG (Tums) Chewable 1 Tablet(s) Chew two times a day  calcium gluconate IVPB 2 Gram(s) IV Intermittent three times a day  carvedilol 3.125 milliGRAM(s) Oral every 12 hours  cholecalciferol 1000 Unit(s) Oral daily  colchicine 0.6 milliGRAM(s) Oral two times a day  dextrose 5%. 1000 milliLiter(s) (50 mL/Hr) IV Continuous <Continuous>  dextrose 50% Injectable 12.5 Gram(s) IV Push once  dextrose 50% Injectable 25 Gram(s) IV Push once  dextrose 50% Injectable 25 Gram(s) IV Push once  famotidine    Tablet 20 milliGRAM(s) Oral two times a day  heparin  Injectable 5000 Unit(s) SubCutaneous every 12 hours  insulin lispro (HumaLOG) corrective regimen sliding scale   SubCutaneous three times a day before meals  insulin lispro (HumaLOG) corrective regimen sliding scale   SubCutaneous at bedtime  levothyroxine 150 MICROGram(s) Oral daily  lisinopril 10 milliGRAM(s) Oral daily  magnesium oxide 400 milliGRAM(s) Oral two times a day with meals  sodium chloride 0.9% lock flush 3 milliLiter(s) IV Push every 8 hours      VITAL:  T(C): , Max: 36.7 (18 @ 06:22)  T(F): , Max: 98 (18 @ 06:22)  HR: 73 (18 @ 15:00)  BP: 89/64 (18 @ 15:00)  BP(mean): 70 (18 @ 15:00)  RR: 16 (18 @ 15:00)  SpO2: 100% (18 @ 15:00)  Wt(kg): --    I and O's:     @ 07:01  -   @ 07:00  --------------------------------------------------------  IN: 450 mL / OUT: 1150 mL / NET: -700 mL     @ 07:01  -   @ 16:17  --------------------------------------------------------  IN: 0 mL / OUT: 100 mL / NET: -100 mL      Height (cm): 170.18 ( 15:00)  Weight (kg): 113.7 ( 15:00)  BMI (kg/m2): 39.3 ( 15:00)  BSA (m2): 2.23 ( 15:00)    PHYSICAL EXAM:    Constitutional: NAD  HEENT: PERRLA    Neck:  No JVD  Respiratory: reduced breath sounds  Cardiovascular: S1 and S2  Gastrointestinal: BS+, soft, NT/ND  Extremities: +  peripheral edema  Neurological: A/O x 3, no focal deficits  Psychiatric: Normal mood, normal affect  : No Gustafson  Skin: No rashes  Access: Not applicable    LABS:                        12.1   7.69  )-----------( 185      ( 25 May 2018 07:20 )             35.7         126<L>  |  82<L>  |  32<H>  ----------------------------<  178<H>  4.2   |  27  |  1.39<H>    Ca    6.9<L>      25 May 2018 07:20  Phos  5.6       Mg     1.6         TPro  6.8  /  Alb  3.1<L>  /  TBili  4.5<H>  /  DBili  x   /  AST  61<H>  /  ALT  57<H>  /  AlkPhos  293<H>            Urine Studies:          RADIOLOGY & ADDITIONAL STUDIES:

## 2018-05-25 NOTE — PROGRESS NOTE ADULT - SUBJECTIVE AND OBJECTIVE BOX
Subjective: No chest pain; pt. complaining of abdominal pain and swelling in her abdomen.   	  aspirin enteric coated 81 milliGRAM(s) Oral daily  buMETAnide Infusion 1 mG/Hr IV Continuous <Continuous>  calcitriol   Capsule 0.25 MICROGram(s) Oral at bedtime  calcium acetate 1334 milliGRAM(s) Oral three times a day with meals  calcium carbonate 500 mG (Tums) Chewable 1 Tablet(s) Chew two times a day  calcium gluconate IVPB 2 Gram(s) IV Intermittent three times a day  carvedilol 3.125 milliGRAM(s) Oral every 12 hours  cholecalciferol 1000 Unit(s) Oral daily  colchicine 0.6 milliGRAM(s) Oral two times a day  dextrose 40% Gel 15 Gram(s) Oral once PRN  dextrose 5%. 1000 milliLiter(s) IV Continuous <Continuous>  dextrose 50% Injectable 12.5 Gram(s) IV Push once  dextrose 50% Injectable 25 Gram(s) IV Push once  dextrose 50% Injectable 25 Gram(s) IV Push once  famotidine    Tablet 20 milliGRAM(s) Oral two times a day  glucagon  Injectable 1 milliGRAM(s) IntraMuscular once PRN  heparin  Injectable 5000 Unit(s) SubCutaneous every 12 hours  insulin lispro (HumaLOG) corrective regimen sliding scale   SubCutaneous three times a day before meals  insulin lispro (HumaLOG) corrective regimen sliding scale   SubCutaneous at bedtime  levothyroxine 150 MICROGram(s) Oral daily  lisinopril 10 milliGRAM(s) Oral daily  magnesium oxide 400 milliGRAM(s) Oral two times a day with meals  sodium chloride 0.9% lock flush 3 milliLiter(s) IV Push every 8 hours                            12.1   7.69  )-----------( 185      ( 25 May 2018 07:20 )             35.7       05-25    126<L>  |  82<L>  |  32<H>  ----------------------------<  178<H>  4.2   |  27  |  1.39<H>    Ca    6.9<L>      25 May 2018 07:20  Phos  5.6     05-25  Mg     1.6     05-25    TPro  6.8  /  Alb  3.1<L>  /  TBili  4.5<H>  /  DBili  x   /  AST  61<H>  /  ALT  57<H>  /  AlkPhos  293<H>  05-25      CARDIAC MARKERS ( 24 May 2018 07:15 )  x     / < 0.06 ng/mL / 500 u/L / x     / x      CARDIAC MARKERS ( 24 May 2018 01:20 )  x     / < 0.06 ng/mL / 483 u/L / x     / x      CARDIAC MARKERS ( 23 May 2018 15:40 )  x     / < 0.06 ng/mL / 559 u/L / 3.13 ng/mL / x            T(C): 36.7 (05-25-18 @ 06:22), Max: 36.7 (05-25-18 @ 06:22)  HR: 82 (05-25-18 @ 06:22) (77 - 87)  BP: 122/87 (05-25-18 @ 06:22) (107/84 - 152/87)  RR: 18 (05-25-18 @ 06:22) (18 - 18)  SpO2: 100% (05-25-18 @ 06:22) (100% - 100%)  Wt(kg): --    I&O's Summary    24 May 2018 07:01  -  25 May 2018 07:00  --------------------------------------------------------  IN: 450 mL / OUT: 1150 mL / NET: -700 mL    25 May 2018 07:01  -  25 May 2018 14:32  --------------------------------------------------------  IN: 0 mL / OUT: 100 mL / NET: -100 mL        Heart: normal S1, S2, RRR, no m/r/g  Lungs: cta bilaterally  Abd: soft nT, nD; + anasarca   Ext: 2+ pitting edema bilaterally; warm to touch     TELEMETRY: 	    ECG:  	  RADIOLOGY:   DIAGNOSTIC TESTING:  [ ] Echocardiogram: < from: TTE with Doppler (w/Cont) (04.26.18 @ 20:04) >  CONCLUSIONS:  1. Mitral annular calcification, otherwise normal mitral  valve. Moderate mitral regurgitation.  2. Moderately dilated left atrium.  LA volume index = 47  cc/m2.  3. Mild left ventricular enlargement.  4. Severe global left ventricular systolic dysfunction.  Endocardial visualization enhanced with intravenous  injection of echo contrast (Definity).  No LV thrombus  seen.  5. Right ventricular enlargement with decreased right  ventricular systolic function.  A device wire is noted in  the right heart.  6. Estimated right ventricular systolic pressure equals 42  mm Hg, assuming right atrial pressure equals 10 mm Hg,  consistent with mild pulmonary hypertension.  7.Normal tricuspid valve.  Severe tricuspid regurgitation.  *** Compared with echocardiogram of 5/16/2017, left  ventricular systolic function has deteriorated.    < end of copied text >    [ ]  Catheterization:  < from: Cardiac Cath Lab (01.08.14 @ 16:49) >  CORONARY VESSELS: The coronary circulation is co-dominant.  LM:   -- LM: Normal.  LAD:   --  LAD: Normal.  CX:   --  Circumflex: Normal.  RCA:   --  RCA: Normal.  COMPLICATIONS: There were no complications.    < end of copied text >    [ ] Stress Test:    OTHER: 	      ASSESSMENT/PLAN: 	62y Female with severe NICM s/p AICD, HTN, DM admitted with acute on chronic systolic heart failure.   -pt. urine output inadequate and patient still volume overloaded  -therefore, RHC was performed in cath lab showing PCWP in 40's and CI of 1.2.   -recommend transfer to CCU and inotropic support and inotropic assisted diuresis  -heart failure consult with Dr. Mckeon called  -continue with medical management of NICM  -discussed with patient, ccu staff, and family     Kunal Muller MD

## 2018-05-26 LAB
ALBUMIN SERPL ELPH-MCNC: 2.8 G/DL — LOW (ref 3.3–5)
ALBUMIN SERPL ELPH-MCNC: 2.9 G/DL — LOW (ref 3.3–5)
ALBUMIN SERPL ELPH-MCNC: 3.2 G/DL — LOW (ref 3.3–5)
ALP SERPL-CCNC: 256 U/L — HIGH (ref 40–120)
ALP SERPL-CCNC: 266 U/L — HIGH (ref 40–120)
ALP SERPL-CCNC: 269 U/L — HIGH (ref 40–120)
ALT FLD-CCNC: 48 U/L — HIGH (ref 4–33)
ALT FLD-CCNC: 48 U/L — HIGH (ref 4–33)
ALT FLD-CCNC: 51 U/L — HIGH (ref 4–33)
AST SERPL-CCNC: 48 U/L — HIGH (ref 4–32)
AST SERPL-CCNC: 48 U/L — HIGH (ref 4–32)
AST SERPL-CCNC: 53 U/L — HIGH (ref 4–32)
BASE EXCESS BLDA CALC-SCNC: 10.1 MMOL/L — SIGNIFICANT CHANGE UP
BASE EXCESS BLDA CALC-SCNC: 10.4 MMOL/L — SIGNIFICANT CHANGE UP
BASE EXCESS BLDV CALC-SCNC: 10.6 MMOL/L — SIGNIFICANT CHANGE UP
BASE EXCESS BLDV CALC-SCNC: 12 MMOL/L — SIGNIFICANT CHANGE UP
BASE EXCESS BLDV CALC-SCNC: 12.1 MMOL/L — SIGNIFICANT CHANGE UP
BASE EXCESS BLDV CALC-SCNC: 14.6 MMOL/L — SIGNIFICANT CHANGE UP
BASE EXCESS BLDV CALC-SCNC: 9.6 MMOL/L — SIGNIFICANT CHANGE UP
BILIRUB SERPL-MCNC: 3.8 MG/DL — HIGH (ref 0.2–1.2)
BILIRUB SERPL-MCNC: 3.9 MG/DL — HIGH (ref 0.2–1.2)
BILIRUB SERPL-MCNC: 3.9 MG/DL — HIGH (ref 0.2–1.2)
BLOOD GAS VENOUS - CREATININE: 1 MG/DL — SIGNIFICANT CHANGE UP (ref 0.5–1.3)
BUN SERPL-MCNC: 21 MG/DL — SIGNIFICANT CHANGE UP (ref 7–23)
BUN SERPL-MCNC: 22 MG/DL — SIGNIFICANT CHANGE UP (ref 7–23)
BUN SERPL-MCNC: 26 MG/DL — HIGH (ref 7–23)
BUN SERPL-MCNC: 28 MG/DL — HIGH (ref 7–23)
CA-I BLD-SCNC: 0.76 MMOL/L — CRITICAL LOW (ref 1.03–1.23)
CALCIUM SERPL-MCNC: 6.7 MG/DL — LOW (ref 8.4–10.5)
CALCIUM SERPL-MCNC: 6.8 MG/DL — LOW (ref 8.4–10.5)
CHLORIDE BLDA-SCNC: 89 MMOL/L — LOW (ref 96–108)
CHLORIDE BLDA-SCNC: 89 MMOL/L — LOW (ref 96–108)
CHLORIDE BLDV-SCNC: 87 MMOL/L — LOW (ref 96–108)
CHLORIDE SERPL-SCNC: 83 MMOL/L — LOW (ref 98–107)
CHLORIDE SERPL-SCNC: 83 MMOL/L — LOW (ref 98–107)
CHLORIDE SERPL-SCNC: 84 MMOL/L — LOW (ref 98–107)
CHLORIDE SERPL-SCNC: 86 MMOL/L — LOW (ref 98–107)
CO2 SERPL-SCNC: 31 MMOL/L — SIGNIFICANT CHANGE UP (ref 22–31)
CO2 SERPL-SCNC: 32 MMOL/L — HIGH (ref 22–31)
CO2 SERPL-SCNC: 33 MMOL/L — HIGH (ref 22–31)
CO2 SERPL-SCNC: 35 MMOL/L — HIGH (ref 22–31)
CREAT SERPL-MCNC: 1.07 MG/DL — SIGNIFICANT CHANGE UP (ref 0.5–1.3)
CREAT SERPL-MCNC: 1.07 MG/DL — SIGNIFICANT CHANGE UP (ref 0.5–1.3)
CREAT SERPL-MCNC: 1.22 MG/DL — SIGNIFICANT CHANGE UP (ref 0.5–1.3)
CREAT SERPL-MCNC: 1.23 MG/DL — SIGNIFICANT CHANGE UP (ref 0.5–1.3)
GAS PNL BLDV: 129 MMOL/L — LOW (ref 136–146)
GAS PNL BLDV: 131 MMOL/L — LOW (ref 136–146)
GLUCOSE BLDA-MCNC: 142 MG/DL — HIGH (ref 70–99)
GLUCOSE BLDA-MCNC: 152 MG/DL — HIGH (ref 70–99)
GLUCOSE BLDV-MCNC: 105 — HIGH (ref 70–99)
GLUCOSE BLDV-MCNC: 150 — HIGH (ref 70–99)
GLUCOSE SERPL-MCNC: 105 MG/DL — HIGH (ref 70–99)
GLUCOSE SERPL-MCNC: 139 MG/DL — HIGH (ref 70–99)
GLUCOSE SERPL-MCNC: 149 MG/DL — HIGH (ref 70–99)
GLUCOSE SERPL-MCNC: 192 MG/DL — HIGH (ref 70–99)
HCO3 BLDA-SCNC: 33 MMOL/L — HIGH (ref 22–26)
HCO3 BLDA-SCNC: 34 MMOL/L — HIGH (ref 22–26)
HCO3 BLDV-SCNC: 33 MMOL/L — HIGH (ref 20–27)
HCO3 BLDV-SCNC: 34 MMOL/L — HIGH (ref 20–27)
HCO3 BLDV-SCNC: 35 MMOL/L — HIGH (ref 20–27)
HCO3 BLDV-SCNC: 35 MMOL/L — HIGH (ref 20–27)
HCO3 BLDV-SCNC: 37 MMOL/L — HIGH (ref 20–27)
HCT VFR BLD CALC: 33.2 % — LOW (ref 34.5–45)
HCT VFR BLD CALC: 33.3 % — LOW (ref 34.5–45)
HCT VFR BLDA CALC: 35.5 % — SIGNIFICANT CHANGE UP (ref 34.5–46.5)
HCT VFR BLDA CALC: 35.9 % — SIGNIFICANT CHANGE UP (ref 34.5–46.5)
HCT VFR BLDV CALC: 36 % — SIGNIFICANT CHANGE UP (ref 34.5–45)
HCT VFR BLDV CALC: 36.6 % — SIGNIFICANT CHANGE UP (ref 34.5–45)
HGB BLD-MCNC: 11.2 G/DL — LOW (ref 11.5–15.5)
HGB BLD-MCNC: 11.2 G/DL — LOW (ref 11.5–15.5)
HGB BLDA-MCNC: 11.5 G/DL — SIGNIFICANT CHANGE UP (ref 11.5–15.5)
HGB BLDA-MCNC: 11.7 G/DL — SIGNIFICANT CHANGE UP (ref 11.5–15.5)
HGB BLDV-MCNC: 11.7 G/DL — SIGNIFICANT CHANGE UP (ref 11.5–15.5)
HGB BLDV-MCNC: 11.9 G/DL — SIGNIFICANT CHANGE UP (ref 11.5–15.5)
LACTATE BLDA-SCNC: 1.2 MMOL/L — SIGNIFICANT CHANGE UP (ref 0.5–2)
LACTATE BLDA-SCNC: 1.3 MMOL/L — SIGNIFICANT CHANGE UP (ref 0.5–2)
LACTATE BLDV-MCNC: 1.8 MMOL/L — SIGNIFICANT CHANGE UP (ref 0.5–2)
LACTATE SERPL-SCNC: 2.2 MMOL/L — HIGH (ref 0.5–2)
MAGNESIUM SERPL-MCNC: 1.4 MG/DL — LOW (ref 1.6–2.6)
MAGNESIUM SERPL-MCNC: 1.8 MG/DL — SIGNIFICANT CHANGE UP (ref 1.6–2.6)
MAGNESIUM SERPL-MCNC: 1.9 MG/DL — SIGNIFICANT CHANGE UP (ref 1.6–2.6)
MAGNESIUM SERPL-MCNC: 2 MG/DL — SIGNIFICANT CHANGE UP (ref 1.6–2.6)
MCHC RBC-ENTMCNC: 23.4 PG — LOW (ref 27–34)
MCHC RBC-ENTMCNC: 23.4 PG — LOW (ref 27–34)
MCHC RBC-ENTMCNC: 33.6 % — SIGNIFICANT CHANGE UP (ref 32–36)
MCHC RBC-ENTMCNC: 33.7 % — SIGNIFICANT CHANGE UP (ref 32–36)
MCV RBC AUTO: 69.3 FL — LOW (ref 80–100)
MCV RBC AUTO: 69.7 FL — LOW (ref 80–100)
NRBC # FLD: 0 — SIGNIFICANT CHANGE UP
NRBC # FLD: 0 — SIGNIFICANT CHANGE UP
PCO2 BLDA: 42 MMHG — SIGNIFICANT CHANGE UP (ref 32–48)
PCO2 BLDA: 48 MMHG — SIGNIFICANT CHANGE UP (ref 32–48)
PCO2 BLDV: 41 MMHG — SIGNIFICANT CHANGE UP (ref 41–51)
PCO2 BLDV: 47 MMHG — SIGNIFICANT CHANGE UP (ref 41–51)
PCO2 BLDV: 48 MMHG — SIGNIFICANT CHANGE UP (ref 41–51)
PCO2 BLDV: 50 MMHG — SIGNIFICANT CHANGE UP (ref 41–51)
PCO2 BLDV: 51 MMHG — SIGNIFICANT CHANGE UP (ref 41–51)
PH BLDA: 7.47 PH — HIGH (ref 7.35–7.45)
PH BLDA: 7.52 PH — HIGH (ref 7.35–7.45)
PH BLDV: 7.46 PH — HIGH (ref 7.32–7.43)
PH BLDV: 7.49 PH — HIGH (ref 7.32–7.43)
PH BLDV: 7.5 PH — HIGH (ref 7.32–7.43)
PH BLDV: 7.51 PH — HIGH (ref 7.32–7.43)
PH BLDV: 7.52 PH — HIGH (ref 7.32–7.43)
PHOSPHATE SERPL-MCNC: 4.5 MG/DL — SIGNIFICANT CHANGE UP (ref 2.5–4.5)
PHOSPHATE SERPL-MCNC: 4.6 MG/DL — HIGH (ref 2.5–4.5)
PHOSPHATE SERPL-MCNC: 5.4 MG/DL — HIGH (ref 2.5–4.5)
PHOSPHATE SERPL-MCNC: 5.5 MG/DL — HIGH (ref 2.5–4.5)
PLATELET # BLD AUTO: 145 K/UL — LOW (ref 150–400)
PLATELET # BLD AUTO: 155 K/UL — SIGNIFICANT CHANGE UP (ref 150–400)
PMV BLD: SIGNIFICANT CHANGE UP FL (ref 7–13)
PMV BLD: SIGNIFICANT CHANGE UP FL (ref 7–13)
PO2 BLDA: 122 MMHG — HIGH (ref 83–108)
PO2 BLDA: 35 MMHG — CRITICAL LOW (ref 83–108)
PO2 BLDV: 159 MMHG — HIGH (ref 35–40)
PO2 BLDV: 28 MMHG — LOW (ref 35–40)
PO2 BLDV: 34 MMHG — LOW (ref 35–40)
PO2 BLDV: 35 MMHG — SIGNIFICANT CHANGE UP (ref 35–40)
PO2 BLDV: 38 MMHG — SIGNIFICANT CHANGE UP (ref 35–40)
POTASSIUM BLDA-SCNC: 3.1 MMOL/L — LOW (ref 3.4–4.5)
POTASSIUM BLDA-SCNC: 4.2 MMOL/L — SIGNIFICANT CHANGE UP (ref 3.4–4.5)
POTASSIUM BLDV-SCNC: 3.4 MMOL/L — SIGNIFICANT CHANGE UP (ref 3.4–4.5)
POTASSIUM BLDV-SCNC: 3.4 MMOL/L — SIGNIFICANT CHANGE UP (ref 3.4–4.5)
POTASSIUM SERPL-MCNC: 3.3 MMOL/L — LOW (ref 3.5–5.3)
POTASSIUM SERPL-MCNC: 3.4 MMOL/L — LOW (ref 3.5–5.3)
POTASSIUM SERPL-MCNC: 3.4 MMOL/L — LOW (ref 3.5–5.3)
POTASSIUM SERPL-MCNC: 4 MMOL/L — SIGNIFICANT CHANGE UP (ref 3.5–5.3)
POTASSIUM SERPL-SCNC: 3.3 MMOL/L — LOW (ref 3.5–5.3)
POTASSIUM SERPL-SCNC: 3.4 MMOL/L — LOW (ref 3.5–5.3)
POTASSIUM SERPL-SCNC: 3.4 MMOL/L — LOW (ref 3.5–5.3)
POTASSIUM SERPL-SCNC: 4 MMOL/L — SIGNIFICANT CHANGE UP (ref 3.5–5.3)
PROT SERPL-MCNC: 6.3 G/DL — SIGNIFICANT CHANGE UP (ref 6–8.3)
PROT SERPL-MCNC: 6.3 G/DL — SIGNIFICANT CHANGE UP (ref 6–8.3)
PROT SERPL-MCNC: 6.6 G/DL — SIGNIFICANT CHANGE UP (ref 6–8.3)
RBC # BLD: 4.78 M/UL — SIGNIFICANT CHANGE UP (ref 3.8–5.2)
RBC # BLD: 4.79 M/UL — SIGNIFICANT CHANGE UP (ref 3.8–5.2)
RBC # FLD: 22.4 % — HIGH (ref 10.3–14.5)
RBC # FLD: 22.4 % — HIGH (ref 10.3–14.5)
SAO2 % BLDA: 100 % — HIGH (ref 95–99)
SAO2 % BLDA: 64.6 % — LOW (ref 95–99)
SAO2 % BLDV: 54.7 % — LOW (ref 60–85)
SAO2 % BLDV: 64 % — SIGNIFICANT CHANGE UP (ref 60–85)
SAO2 % BLDV: 66.4 % — SIGNIFICANT CHANGE UP (ref 60–85)
SAO2 % BLDV: 71.1 % — SIGNIFICANT CHANGE UP (ref 60–85)
SAO2 % BLDV: 98.9 % — HIGH (ref 60–85)
SODIUM BLDA-SCNC: 127 MMOL/L — LOW (ref 136–146)
SODIUM BLDA-SCNC: 129 MMOL/L — LOW (ref 136–146)
SODIUM SERPL-SCNC: 131 MMOL/L — LOW (ref 135–145)
SODIUM SERPL-SCNC: 132 MMOL/L — LOW (ref 135–145)
SODIUM SERPL-SCNC: 132 MMOL/L — LOW (ref 135–145)
SODIUM SERPL-SCNC: 134 MMOL/L — LOW (ref 135–145)
WBC # BLD: 6.95 K/UL — SIGNIFICANT CHANGE UP (ref 3.8–10.5)
WBC # BLD: 7.33 K/UL — SIGNIFICANT CHANGE UP (ref 3.8–10.5)
WBC # FLD AUTO: 6.95 K/UL — SIGNIFICANT CHANGE UP (ref 3.8–10.5)
WBC # FLD AUTO: 7.33 K/UL — SIGNIFICANT CHANGE UP (ref 3.8–10.5)

## 2018-05-26 PROCEDURE — 71045 X-RAY EXAM CHEST 1 VIEW: CPT | Mod: 26

## 2018-05-26 PROCEDURE — 99233 SBSQ HOSP IP/OBS HIGH 50: CPT | Mod: GC

## 2018-05-26 RX ORDER — HYDRALAZINE HCL 50 MG
37.5 TABLET ORAL EVERY 8 HOURS
Qty: 0 | Refills: 0 | Status: DISCONTINUED | OUTPATIENT
Start: 2018-05-26 | End: 2018-05-28

## 2018-05-26 RX ORDER — POTASSIUM CHLORIDE 20 MEQ
40 PACKET (EA) ORAL EVERY 4 HOURS
Qty: 0 | Refills: 0 | Status: DISCONTINUED | OUTPATIENT
Start: 2018-05-26 | End: 2018-05-26

## 2018-05-26 RX ORDER — POTASSIUM CHLORIDE 20 MEQ
20 PACKET (EA) ORAL ONCE
Qty: 0 | Refills: 0 | Status: COMPLETED | OUTPATIENT
Start: 2018-05-26 | End: 2018-05-26

## 2018-05-26 RX ORDER — MAGNESIUM SULFATE 500 MG/ML
2 VIAL (ML) INJECTION
Qty: 0 | Refills: 0 | Status: COMPLETED | OUTPATIENT
Start: 2018-05-26 | End: 2018-05-26

## 2018-05-26 RX ORDER — CALCIUM GLUCONATE 100 MG/ML
2 VIAL (ML) INTRAVENOUS ONCE
Qty: 0 | Refills: 0 | Status: COMPLETED | OUTPATIENT
Start: 2018-05-26 | End: 2018-05-26

## 2018-05-26 RX ORDER — POTASSIUM CHLORIDE 20 MEQ
20 PACKET (EA) ORAL
Qty: 0 | Refills: 0 | Status: COMPLETED | OUTPATIENT
Start: 2018-05-26 | End: 2018-05-26

## 2018-05-26 RX ORDER — HYDRALAZINE HCL 50 MG
10 TABLET ORAL ONCE
Qty: 0 | Refills: 0 | Status: COMPLETED | OUTPATIENT
Start: 2018-05-26 | End: 2018-05-26

## 2018-05-26 RX ORDER — MAGNESIUM SULFATE 500 MG/ML
2 VIAL (ML) INJECTION ONCE
Qty: 0 | Refills: 0 | Status: COMPLETED | OUTPATIENT
Start: 2018-05-26 | End: 2018-05-26

## 2018-05-26 RX ORDER — CHLORHEXIDINE GLUCONATE 213 G/1000ML
1 SOLUTION TOPICAL
Qty: 0 | Refills: 0 | Status: DISCONTINUED | OUTPATIENT
Start: 2018-05-26 | End: 2018-06-01

## 2018-05-26 RX ORDER — GABAPENTIN 400 MG/1
100 CAPSULE ORAL EVERY 8 HOURS
Qty: 0 | Refills: 0 | Status: DISCONTINUED | OUTPATIENT
Start: 2018-05-26 | End: 2018-06-01

## 2018-05-26 RX ORDER — HYDRALAZINE HCL 50 MG
25 TABLET ORAL EVERY 8 HOURS
Qty: 0 | Refills: 0 | Status: DISCONTINUED | OUTPATIENT
Start: 2018-05-26 | End: 2018-05-26

## 2018-05-26 RX ADMIN — Medication 1 TABLET(S): at 22:52

## 2018-05-26 RX ADMIN — BUMETANIDE 10 MG/HR: 0.25 INJECTION INTRAMUSCULAR; INTRAVENOUS at 06:00

## 2018-05-26 RX ADMIN — Medication 1000 UNIT(S): at 13:23

## 2018-05-26 RX ADMIN — Medication 10 MILLIGRAM(S): at 10:25

## 2018-05-26 RX ADMIN — Medication 50 GRAM(S): at 21:12

## 2018-05-26 RX ADMIN — Medication 50 GRAM(S): at 03:01

## 2018-05-26 RX ADMIN — FAMOTIDINE 20 MILLIGRAM(S): 10 INJECTION INTRAVENOUS at 06:27

## 2018-05-26 RX ADMIN — Medication 100 MILLIEQUIVALENT(S): at 16:36

## 2018-05-26 RX ADMIN — Medication 1334 MILLIGRAM(S): at 17:44

## 2018-05-26 RX ADMIN — Medication 10 MILLIGRAM(S): at 06:28

## 2018-05-26 RX ADMIN — Medication 100 MILLIEQUIVALENT(S): at 03:01

## 2018-05-26 RX ADMIN — MAGNESIUM OXIDE 400 MG ORAL TABLET 400 MILLIGRAM(S): 241.3 TABLET ORAL at 17:45

## 2018-05-26 RX ADMIN — CHLORHEXIDINE GLUCONATE 1 APPLICATION(S): 213 SOLUTION TOPICAL at 13:26

## 2018-05-26 RX ADMIN — Medication 100 MILLIEQUIVALENT(S): at 04:03

## 2018-05-26 RX ADMIN — Medication 200 GRAM(S): at 16:03

## 2018-05-26 RX ADMIN — FAMOTIDINE 20 MILLIGRAM(S): 10 INJECTION INTRAVENOUS at 17:44

## 2018-05-26 RX ADMIN — BUMETANIDE 10 MG/HR: 0.25 INJECTION INTRAMUSCULAR; INTRAVENOUS at 10:27

## 2018-05-26 RX ADMIN — Medication 1334 MILLIGRAM(S): at 10:24

## 2018-05-26 RX ADMIN — Medication 150 MICROGRAM(S): at 10:25

## 2018-05-26 RX ADMIN — Medication 17.05 MICROGRAM(S)/KG/MIN: at 10:26

## 2018-05-26 RX ADMIN — Medication 100 MILLIEQUIVALENT(S): at 14:36

## 2018-05-26 RX ADMIN — CALCITRIOL 0.25 MICROGRAM(S): 0.5 CAPSULE ORAL at 22:52

## 2018-05-26 RX ADMIN — Medication 25 MILLIGRAM(S): at 14:16

## 2018-05-26 RX ADMIN — Medication 81 MILLIGRAM(S): at 13:27

## 2018-05-26 RX ADMIN — Medication 2: at 13:22

## 2018-05-26 RX ADMIN — Medication 37.5 MILLIGRAM(S): at 22:53

## 2018-05-26 RX ADMIN — Medication 50 GRAM(S): at 02:05

## 2018-05-26 RX ADMIN — Medication 0.6 MILLIGRAM(S): at 06:27

## 2018-05-26 RX ADMIN — Medication 17.05 MICROGRAM(S)/KG/MIN: at 06:29

## 2018-05-26 RX ADMIN — Medication 200 GRAM(S): at 06:51

## 2018-05-26 RX ADMIN — SODIUM CHLORIDE 3 MILLILITER(S): 9 INJECTION INTRAMUSCULAR; INTRAVENOUS; SUBCUTANEOUS at 20:34

## 2018-05-26 RX ADMIN — Medication 17.05 MICROGRAM(S)/KG/MIN: at 22:54

## 2018-05-26 RX ADMIN — SODIUM CHLORIDE 3 MILLILITER(S): 9 INJECTION INTRAMUSCULAR; INTRAVENOUS; SUBCUTANEOUS at 06:30

## 2018-05-26 RX ADMIN — Medication 1 TABLET(S): at 06:28

## 2018-05-26 RX ADMIN — MAGNESIUM OXIDE 400 MG ORAL TABLET 400 MILLIGRAM(S): 241.3 TABLET ORAL at 10:24

## 2018-05-26 RX ADMIN — Medication 100 MILLIEQUIVALENT(S): at 02:05

## 2018-05-26 RX ADMIN — Medication 100 MILLIEQUIVALENT(S): at 19:38

## 2018-05-26 RX ADMIN — BUMETANIDE 10 MG/HR: 0.25 INJECTION INTRAMUSCULAR; INTRAVENOUS at 22:54

## 2018-05-26 RX ADMIN — HEPARIN SODIUM 5000 UNIT(S): 5000 INJECTION INTRAVENOUS; SUBCUTANEOUS at 06:27

## 2018-05-26 RX ADMIN — HEPARIN SODIUM 5000 UNIT(S): 5000 INJECTION INTRAVENOUS; SUBCUTANEOUS at 17:44

## 2018-05-26 RX ADMIN — Medication 100 MILLIEQUIVALENT(S): at 18:04

## 2018-05-26 RX ADMIN — GABAPENTIN 100 MILLIGRAM(S): 400 CAPSULE ORAL at 22:52

## 2018-05-26 RX ADMIN — GABAPENTIN 100 MILLIGRAM(S): 400 CAPSULE ORAL at 14:16

## 2018-05-26 RX ADMIN — Medication 1334 MILLIGRAM(S): at 13:23

## 2018-05-26 NOTE — PROGRESS NOTE ADULT - SUBJECTIVE AND OBJECTIVE BOX
[facility]  Wale Tovar PA-C  [address] [place] [address]  [phone]      INTERVAL HPI/OVERNIGHT EVENTS:    no new events  lfts improving       MEDICATIONS  (STANDING):  aspirin enteric coated 81 milliGRAM(s) Oral daily  buMETAnide Infusion 1 mG/Hr (10 mL/Hr) IV Continuous <Continuous>  calcitriol   Capsule 0.25 MICROGram(s) Oral at bedtime  calcium acetate 1334 milliGRAM(s) Oral three times a day with meals  calcium carbonate 500 mG (Tums) Chewable 1 Tablet(s) Chew two times a day  chlorhexidine 4% Liquid 1 Application(s) Topical <User Schedule>  cholecalciferol 1000 Unit(s) Oral daily  dextrose 5%. 1000 milliLiter(s) (50 mL/Hr) IV Continuous <Continuous>  dextrose 50% Injectable 12.5 Gram(s) IV Push once  dextrose 50% Injectable 25 Gram(s) IV Push once  dextrose 50% Injectable 25 Gram(s) IV Push once  DOBUTamine Infusion 5 MICROgram(s)/kG/Min (17.055 mL/Hr) IV Continuous <Continuous>  famotidine    Tablet 20 milliGRAM(s) Oral two times a day  heparin  Injectable 5000 Unit(s) SubCutaneous every 12 hours  hydrALAZINE 25 milliGRAM(s) Oral every 8 hours  insulin lispro (HumaLOG) corrective regimen sliding scale   SubCutaneous three times a day before meals  insulin lispro (HumaLOG) corrective regimen sliding scale   SubCutaneous at bedtime  levothyroxine 150 MICROGram(s) Oral daily  magnesium oxide 400 milliGRAM(s) Oral two times a day with meals  sodium chloride 0.9% lock flush 3 milliLiter(s) IV Push every 8 hours    MEDICATIONS  (PRN):  dextrose 40% Gel 15 Gram(s) Oral once PRN Blood Glucose LESS THAN 70 milliGRAM(s)/deciliter  glucagon  Injectable 1 milliGRAM(s) IntraMuscular once PRN Glucose LESS THAN 70 milligrams/deciliter      Allergies    penicillin (Rash)    Intolerances        ROS:   General:  No wt loss, fevers, chills, night sweats, fatigue,   Eyes:  Good vision, no reported pain  ENT:  No sore throat, pain, runny nose, dysphagia  CV:  No pain, palpitations, hypo/hypertension  Resp:  No dyspnea, cough, tachypnea, wheezing  GI:  No pain, No nausea, No vomiting, No diarrhea, No constipation, No weight loss, No fever, No pruritis, No rectal bleeding, No tarry stools, No dysphagia,  :  No pain, bleeding, incontinence, nocturia  Muscle:  No pain, weakness  Neuro:  No weakness, tingling, memory problems  Psych:  No fatigue, insomnia, mood problems, depression  Endocrine:  No polyuria, polydipsia, cold/heat intolerance  Heme:  No petechiae, ecchymosis, easy bruisability  Skin:  No rash, tattoos, scars, edema      PHYSICAL EXAM:   Vital Signs:  Vital Signs Last 24 Hrs  T(C): 36.4 (26 May 2018 08:00), Max: 36.4 (26 May 2018 08:00)  T(F): 97.5 (26 May 2018 08:00), Max: 97.5 (26 May 2018 08:00)  HR: 88 (26 May 2018 11:00) (64 - 91)  BP: 124/83 (26 May 2018 11:00) (89/64 - 131/85)  BP(mean): 91 (26 May 2018 11:00) (70 - 91)  RR: 11 (26 May 2018 11:00) (9 - 22)  SpO2: 100% (26 May 2018 11:00) (99% - 100%)  Daily Height in cm: 170.18 (25 May 2018 15:00)    Daily Weight in k.3 (26 May 2018 06:00)    GENERAL:  Appears stated age, well-groomed, well-nourished, no distress  HEENT:  NC/AT,  conjunctivae clear and pink, no thyromegaly, nodules, adenopathy, no JVD, sclera -anicteric  CHEST:  Full & symmetric excursion, no increased effort, breath sounds clear  HEART:  Regular rhythm, S1, S2, no murmur/rub/S3/S4, no abdominal bruit, no edema  ABDOMEN:  Soft, non-tender, non-distended, normoactive bowel sounds,  no masses ,no hepato-splenomegaly, no signs of chronic liver disease  EXTEREMITIES:  no cyanosis,clubbing or edema  SKIN:  No rash/erythema/ecchymoses/petechiae/wounds/abscess/warm/dry  NEURO:  Alert, oriented, no asterixis, no tremor, no encephalopathy      LABS:                        11.2   7.33  )-----------( 155      ( 26 May 2018 06:16 )             33.3     05-    132<L>  |  84<L>  |  26<H>  ----------------------------<  139<H>  4.0   |  33<H>  |  1.22    Ca    6.7<L>      26 May 2018 06:16  Phos  5.4       Mg     2.0         TPro  6.3  /  Alb  2.9<L>  /  TBili  3.8<H>  /  DBili  x   /  AST  48<H>  /  ALT  48<H>  /  AlkPhos  266<H>        Urinalysis Basic - ( 25 May 2018 15:43 )    Color: PLYEL / Appearance: CLEAR / S.006 / pH: 7.0  Gluc: NEGATIVE / Ketone: NEGATIVE  / Bili: NEGATIVE / Urobili: NORMAL mg/dL   Blood: SMALL / Protein: NEGATIVE mg/dL / Nitrite: NEGATIVE   Leuk Esterase: NEGATIVE / RBC: 0-2 / WBC 0-2   Sq Epi: x / Non Sq Epi: x / Bacteria: x        RADIOLOGY & ADDITIONAL TESTS:

## 2018-05-26 NOTE — PROGRESS NOTE ADULT - PROBLEM SELECTOR PLAN 2
Continue Bumex gtt @ 1 mg/hr. Diuresing well with net neg 5600 and wt down by 6 kg. Continue Bumex gtt @ 1 mg/hr. Diuresing well with net neg 5600 and wt down by 6 kg. Per HF recs patient will need eventual evaluation for LVAD/transplant.

## 2018-05-26 NOTE — PROGRESS NOTE ADULT - SUBJECTIVE AND OBJECTIVE BOX
INTERVAL HPI/OVERNIGHT EVENTS: I feel much better . swelling of legs is down. Have pain ankles.   Vital Signs Last 24 Hrs  T(C): 36.4 (26 May 2018 08:00), Max: 36.4 (26 May 2018 08:00)  T(F): 97.5 (26 May 2018 08:00), Max: 97.5 (26 May 2018 08:00)  HR: 89 (26 May 2018 13:00) (64 - 91)  BP: 109/74 (26 May 2018 13:00) (89/64 - 131/85)  BP(mean): 82 (26 May 2018 13:00) (70 - 91)  RR: 22 (26 May 2018 13:00) (9 - 22)  SpO2: 98% (26 May 2018 13:00) (98% - 100%)  I&O's Summary    25 May 2018 07:  -  26 May 2018 07:00  --------------------------------------------------------  IN: 1296.7 mL / OUT: 5110 mL / NET: -3813.3 mL    26 May 2018 07:  -  26 May 2018 14:51  --------------------------------------------------------  IN: 689.7 mL / OUT: 4300 mL / NET: -3610.3 mL      MEDICATIONS  (STANDING):  aspirin enteric coated 81 milliGRAM(s) Oral daily  buMETAnide Infusion 1 mG/Hr (10 mL/Hr) IV Continuous <Continuous>  calcitriol   Capsule 0.25 MICROGram(s) Oral at bedtime  calcium acetate 1334 milliGRAM(s) Oral three times a day with meals  calcium carbonate 500 mG (Tums) Chewable 1 Tablet(s) Chew two times a day  chlorhexidine 4% Liquid 1 Application(s) Topical <User Schedule>  cholecalciferol 1000 Unit(s) Oral daily  dextrose 5%. 1000 milliLiter(s) (50 mL/Hr) IV Continuous <Continuous>  dextrose 50% Injectable 12.5 Gram(s) IV Push once  dextrose 50% Injectable 25 Gram(s) IV Push once  dextrose 50% Injectable 25 Gram(s) IV Push once  DOBUTamine Infusion 5 MICROgram(s)/kG/Min (17.055 mL/Hr) IV Continuous <Continuous>  famotidine    Tablet 20 milliGRAM(s) Oral two times a day  gabapentin 100 milliGRAM(s) Oral every 8 hours  heparin  Injectable 5000 Unit(s) SubCutaneous every 12 hours  hydrALAZINE 25 milliGRAM(s) Oral every 8 hours  insulin lispro (HumaLOG) corrective regimen sliding scale   SubCutaneous three times a day before meals  insulin lispro (HumaLOG) corrective regimen sliding scale   SubCutaneous at bedtime  levothyroxine 150 MICROGram(s) Oral daily  magnesium oxide 400 milliGRAM(s) Oral two times a day with meals  sodium chloride 0.9% lock flush 3 milliLiter(s) IV Push every 8 hours    MEDICATIONS  (PRN):  dextrose 40% Gel 15 Gram(s) Oral once PRN Blood Glucose LESS THAN 70 milliGRAM(s)/deciliter  glucagon  Injectable 1 milliGRAM(s) IntraMuscular once PRN Glucose LESS THAN 70 milligrams/deciliter    LABS:                        11.2   6.95  )-----------( 145      ( 26 May 2018 12:30 )             33.2         132<L>  |  83<L>  |  22  ----------------------------<  192<H>  3.4<L>   |  32<H>  |  1.07    Ca    6.8<L>      26 May 2018 13:17  Phos  4.6       Mg     1.9         TPro  6.3  /  Alb  2.9<L>  /  TBili  3.8<H>  /  DBili  x   /  AST  48<H>  /  ALT  48<H>  /  AlkPhos  266<H>        Urinalysis Basic - ( 25 May 2018 15:43 )    Color: PLYEL / Appearance: CLEAR / S.006 / pH: 7.0  Gluc: NEGATIVE / Ketone: NEGATIVE  / Bili: NEGATIVE / Urobili: NORMAL mg/dL   Blood: SMALL / Protein: NEGATIVE mg/dL / Nitrite: NEGATIVE   Leuk Esterase: NEGATIVE / RBC: 0-2 / WBC 0-2   Sq Epi: x / Non Sq Epi: x / Bacteria: x      CAPILLARY BLOOD GLUCOSE      POCT Blood Glucose.: 188 mg/dL (26 May 2018 12:50)  POCT Blood Glucose.: 140 mg/dL (26 May 2018 08:51)  POCT Blood Glucose.: 129 mg/dL (25 May 2018 22:13)  POCT Blood Glucose.: 89 mg/dL (25 May 2018 16:42)      ABG - ( 26 May 2018 05:30 )  pH, Arterial: 7.52  pH, Blood: x     /  pCO2: 42    /  pO2: 122   / HCO3: 34    / Base Excess: 10.1  /  SaO2: 100               Urinalysis Basic - ( 25 May 2018 15:43 )    Color: PLYEL / Appearance: CLEAR / S.006 / pH: 7.0  Gluc: NEGATIVE / Ketone: NEGATIVE  / Bili: NEGATIVE / Urobili: NORMAL mg/dL   Blood: SMALL / Protein: NEGATIVE mg/dL / Nitrite: NEGATIVE   Leuk Esterase: NEGATIVE / RBC: 0-2 / WBC 0-2   Sq Epi: x / Non Sq Epi: x / Bacteria: x      REVIEW OF SYSTEMS:  CONSTITUTIONAL: No fever, weight loss, or fatigue  EYES: No eye pain, visual disturbances, or discharge  ENMT:  No difficulty hearing, tinnitus, vertigo; No sinus or throat pain  NECK: No pain or stiffness  BREASTS: No pain, masses, or nipple discharge  RESPIRATORY: No cough, wheezing, chills or hemoptysis; No shortness of breath  CARDIOVASCULAR: No chest pain, palpitations, dizziness, now less  leg swelling  GASTROINTESTINAL: No abdominal or epigastric pain. No nausea, vomiting, or hematemesis; No diarrhea or constipation. No melena or hematochezia.  GENITOURINARY: No dysuria, frequency, hematuria, or incontinence  NEUROLOGICAL: No headaches, memory loss, loss of strength, numbness, or tremors  SKIN: No itching, burning, rashes, or lesions   LYMPH NODES: No enlarged glands  ENDOCRINE: No heat or cold intolerance; No hair loss  MUSCULOSKELETAL: ankle pain       Consultant(s) Notes Reviewed:  [x ] YES  [ ] NO    PHYSICAL EXAM:  GENERAL: NAD, well-groomed, well-developed, not in any distress ,  HEAD:  Atraumatic, Normocephalic  EYES: EOMI, PERRLA, conjunctiva and sclera clear  ENMT: No tonsillar erythema, exudates, or enlargement; Moist mucous membranes, Good dentition, No lesions  NECK: Supple, No JVD, Normal thyroid  NERVOUS SYSTEM:  Alert & Oriented X3, No focal deficit   CHEST/LUNG: Good air entry bilateral with no  rales, rhonchi, wheezing, or rubs  HEART: Regular rate and rhythm; No murmurs, rubs, or gallops  ABDOMEN: Soft, Nontender, Nondistended; Bowel sounds present  EXTREMITIES:  2+ Peripheral Pulses, No clubbing, cyanosis,but less edema  SKIN: No rashes or lesions    Care Discussed with Consultants/Other Providers [ x] YES  [ ] NO

## 2018-05-26 NOTE — PROGRESS NOTE ADULT - ASSESSMENT
· Assessment      The patient is a deidre 62yearold female with past medical history of hypertension, diabetes, thyroid cancer, with presumed removal of parathyroid gland, congestive nonischemic cardiomyopathy, congestive heart failure, and pulmonary hypertension who presents with shortness of breath.  She is grossly volume overloaded.  The patient has hypervolemic hyponatremia likely secondary to heart failure.  She also has hypocalcemia likely secondary to the fact that she likely has no parathyroid gland.  Acute decompensated heart failure    Endocrine. continue p.o. calcium supplementation and rocaltrol. calcium slowly improving   Renal.   Diuresis per CACT/HF team (Bumex 1 mg gtt and  gtt). Renal function intact  Gastrointestinal.  Will trend LFT.  This is likely  congestive hepatic pathology.  Hypervolemic hyponatremia - improving  Hyperphosphatemia - due to diet? mild, trend for now    Nannette Rice NP  [facility]  [phone] · Assessment      The patient is a deidre 62yearold female with past medical history of hypertension, diabetes, thyroid cancer, with presumed removal of parathyroid gland, congestive nonischemic cardiomyopathy, congestive heart failure, and pulmonary hypertension who presents with shortness of breath.  She is grossly volume overloaded.  The patient has hypervolemic hyponatremia likely secondary to heart failure.  She also has hypocalcemia likely secondary to the fact that she likely has no parathyroid gland.  Acute decompensated heart failure    Endocrine. continue p.o. calcium supplementation and rocaltrol. calcium slowly improving   Renal.   Diuresis per CACT/HF team (Bumex 1 mg gtt and  gtt). Renal function intact. Maintain fluid restriction, daily weights, I&O's  Gastrointestinal.  Will trend LFT.  This is likely  congestive hepatic pathology.  Hypervolemic hyponatremia - improving  Hyperphosphatemia - due to diet? improving, maintain low phos diet  Appreciate current CCU care	    Nannette Rice NP  [facility]  [phone]

## 2018-05-26 NOTE — PROGRESS NOTE ADULT - SUBJECTIVE AND OBJECTIVE BOX
Subjective: No chest pain; sob improving LE edema improving      MEDICATIONS  (STANDING):  aspirin enteric coated 81 milliGRAM(s) Oral daily  buMETAnide Infusion 1 mG/Hr (10 mL/Hr) IV Continuous <Continuous>  calcitriol   Capsule 0.25 MICROGram(s) Oral at bedtime  calcium acetate 1334 milliGRAM(s) Oral three times a day with meals  calcium carbonate 500 mG (Tums) Chewable 1 Tablet(s) Chew two times a day  chlorhexidine 4% Liquid 1 Application(s) Topical <User Schedule>  cholecalciferol 1000 Unit(s) Oral daily  dextrose 5%. 1000 milliLiter(s) (50 mL/Hr) IV Continuous <Continuous>  dextrose 50% Injectable 12.5 Gram(s) IV Push once  dextrose 50% Injectable 25 Gram(s) IV Push once  dextrose 50% Injectable 25 Gram(s) IV Push once  DOBUTamine Infusion 5 MICROgram(s)/kG/Min (17.055 mL/Hr) IV Continuous <Continuous>  famotidine    Tablet 20 milliGRAM(s) Oral two times a day  heparin  Injectable 5000 Unit(s) SubCutaneous every 12 hours  hydrALAZINE 25 milliGRAM(s) Oral every 8 hours  insulin lispro (HumaLOG) corrective regimen sliding scale   SubCutaneous three times a day before meals  insulin lispro (HumaLOG) corrective regimen sliding scale   SubCutaneous at bedtime  levothyroxine 150 MICROGram(s) Oral daily  magnesium oxide 400 milliGRAM(s) Oral two times a day with meals  sodium chloride 0.9% lock flush 3 milliLiter(s) IV Push every 8 hours    MEDICATIONS  (PRN):  dextrose 40% Gel 15 Gram(s) Oral once PRN Blood Glucose LESS THAN 70 milliGRAM(s)/deciliter  glucagon  Injectable 1 milliGRAM(s) IntraMuscular once PRN Glucose LESS THAN 70 milligrams/deciliter      LABS:                        11.2   7.33  )-----------( 155      ( 26 May 2018 06:16 )             33.3     Hemoglobin: 11.2 g/dL (05-26 @ 06:16)  Hemoglobin: 12.1 g/dL (05-25 @ 07:20)  Hemoglobin: 11.8 g/dL (05-24 @ 07:15)  Hemoglobin: 12.3 g/dL (05-23 @ 15:40)    05-26    132<L>  |  84<L>  |  26<H>  ----------------------------<  139<H>  4.0   |  33<H>  |  1.22    Ca    6.7<L>      26 May 2018 06:16  Phos  5.4     05-26  Mg     2.0     05-26    TPro  6.3  /  Alb  2.9<L>  /  TBili  3.8<H>  /  DBili  x   /  AST  48<H>  /  ALT  48<H>  /  AlkPhos  266<H>  05-26    Creatinine Trend: 1.22<--, 1.23<--, 1.13<--, 1.39<--, 1.54<--, 1.42<--     CARDIAC MARKERS ( 24 May 2018 07:15 )  x     / < 0.06 ng/mL / 500 u/L / x     / x      CARDIAC MARKERS ( 24 May 2018 01:20 )  x     / < 0.06 ng/mL / 483 u/L / x     / x      CARDIAC MARKERS ( 23 May 2018 15:40 )  x     / < 0.06 ng/mL / 559 u/L / 3.13 ng/mL / x            PHYSICAL EXAM  Vital Signs Last 24 Hrs  T(C): 36.4 (26 May 2018 08:00), Max: 36.4 (26 May 2018 08:00)  T(F): 97.5 (26 May 2018 08:00), Max: 97.5 (26 May 2018 08:00)  HR: 91 (26 May 2018 10:00) (64 - 91)  BP: 110/75 (26 May 2018 10:00) (89/64 - 131/85)  BP(mean): 84 (26 May 2018 10:00) (70 - 91)  RR: 16 (26 May 2018 10:00) (9 - 22)  SpO2: 100% (26 May 2018 10:00) (99% - 100%)    Heart: normal S1, S2, RRR, no m/r/g  Lungs: cta bilaterally  Abd: soft nT, nD  Ext: 2+ pitting edema bilaterally; warm to touch     TELEMETRY: 	    ECG:  	  RADIOLOGY:   < from: Xray Chest 1 View- PORTABLE-Urgent (05.26.18 @ 10:32) >  INTERPRETATION:     May 26 at 7:15 AM:  Westwood-Radha catheter seen with its tip in the right interlobar artery   slightly advanced since the last study.    Lungs are clear, heart is enlarged and no effusion or pneumothorax.    9:39 AM:  Tip of the Westwood-Radha catheter has been pulled back so it is now in the   right main pulmonary artery segment. Left-sided AICD unchanged, stable   cardiomegaly and clear lungs.      COMPARISON:  May 25      IMPRESSION:  Follow-up studies post Westwood-Radha catheter placement and     < end of copied text >    DIAGNOSTIC TESTING:  [ ] Echocardiogram: < from: TTE with Doppler (w/Cont) (04.26.18 @ 20:04) >  CONCLUSIONS:  1. Mitral annular calcification, otherwise normal mitral  valve. Moderate mitral regurgitation.  2. Moderately dilated left atrium.  LA volume index = 47  cc/m2.  3. Mild left ventricular enlargement.  4. Severe global left ventricular systolic dysfunction.  Endocardial visualization enhanced with intravenous  injection of echo contrast (Definity).  No LV thrombus  seen.  5. Right ventricular enlargement with decreased right  ventricular systolic function.  A device wire is noted in  the right heart.  6. Estimated right ventricular systolic pressure equals 42  mm Hg, assuming right atrial pressure equals 10 mm Hg,  consistent with mild pulmonary hypertension.  7.Normal tricuspid valve.  Severe tricuspid regurgitation.  *** Compared with echocardiogram of 5/16/2017, left  ventricular systolic function has deteriorated.    < end of copied text >    [ ]  Catheterization:  < from: Cardiac Cath Lab (01.08.14 @ 16:49) >  CORONARY VESSELS: The coronary circulation is co-dominant.  LM:   -- LM: Normal.  LAD:   --  LAD: Normal.  CX:   --  Circumflex: Normal.  RCA:   --  RCA: Normal.  COMPLICATIONS: There were no complications.    < end of copied text >    [ ] Stress Test:    OTHER: 	      ASSESSMENT/PLAN: 	62y Female with severe NICM s/p AICD, HTN, DM admitted with acute on chronic systolic heart failure.   -Pt still volume overloaded but warm to touch   -c/w  Bumex gtt  -pt chest pressure likely secondary to heart failure - given negative enzymes and prior cardiac cath in 2014 showing normal coronary arteries, no urgent coronary angiogram needed at this time  -if patient volume status / UO does not improve, will plan for RHC   -management of heart failure per cardiology  -follow up renal  -cont care ccu Subjective: No chest pain; sob improving LE edema improving      MEDICATIONS  (STANDING):  aspirin enteric coated 81 milliGRAM(s) Oral daily  buMETAnide Infusion 1 mG/Hr (10 mL/Hr) IV Continuous <Continuous>  calcitriol   Capsule 0.25 MICROGram(s) Oral at bedtime  calcium acetate 1334 milliGRAM(s) Oral three times a day with meals  calcium carbonate 500 mG (Tums) Chewable 1 Tablet(s) Chew two times a day  chlorhexidine 4% Liquid 1 Application(s) Topical <User Schedule>  cholecalciferol 1000 Unit(s) Oral daily  dextrose 5%. 1000 milliLiter(s) (50 mL/Hr) IV Continuous <Continuous>  dextrose 50% Injectable 12.5 Gram(s) IV Push once  dextrose 50% Injectable 25 Gram(s) IV Push once  dextrose 50% Injectable 25 Gram(s) IV Push once  DOBUTamine Infusion 5 MICROgram(s)/kG/Min (17.055 mL/Hr) IV Continuous <Continuous>  famotidine    Tablet 20 milliGRAM(s) Oral two times a day  heparin  Injectable 5000 Unit(s) SubCutaneous every 12 hours  hydrALAZINE 25 milliGRAM(s) Oral every 8 hours  insulin lispro (HumaLOG) corrective regimen sliding scale   SubCutaneous three times a day before meals  insulin lispro (HumaLOG) corrective regimen sliding scale   SubCutaneous at bedtime  levothyroxine 150 MICROGram(s) Oral daily  magnesium oxide 400 milliGRAM(s) Oral two times a day with meals  sodium chloride 0.9% lock flush 3 milliLiter(s) IV Push every 8 hours    MEDICATIONS  (PRN):  dextrose 40% Gel 15 Gram(s) Oral once PRN Blood Glucose LESS THAN 70 milliGRAM(s)/deciliter  glucagon  Injectable 1 milliGRAM(s) IntraMuscular once PRN Glucose LESS THAN 70 milligrams/deciliter      LABS:                        11.2   7.33  )-----------( 155      ( 26 May 2018 06:16 )             33.3     Hemoglobin: 11.2 g/dL (05-26 @ 06:16)  Hemoglobin: 12.1 g/dL (05-25 @ 07:20)  Hemoglobin: 11.8 g/dL (05-24 @ 07:15)  Hemoglobin: 12.3 g/dL (05-23 @ 15:40)    05-26    132<L>  |  84<L>  |  26<H>  ----------------------------<  139<H>  4.0   |  33<H>  |  1.22    Ca    6.7<L>      26 May 2018 06:16  Phos  5.4     05-26  Mg     2.0     05-26    TPro  6.3  /  Alb  2.9<L>  /  TBili  3.8<H>  /  DBili  x   /  AST  48<H>  /  ALT  48<H>  /  AlkPhos  266<H>  05-26    Creatinine Trend: 1.22<--, 1.23<--, 1.13<--, 1.39<--, 1.54<--, 1.42<--     CARDIAC MARKERS ( 24 May 2018 07:15 )  x     / < 0.06 ng/mL / 500 u/L / x     / x      CARDIAC MARKERS ( 24 May 2018 01:20 )  x     / < 0.06 ng/mL / 483 u/L / x     / x      CARDIAC MARKERS ( 23 May 2018 15:40 )  x     / < 0.06 ng/mL / 559 u/L / 3.13 ng/mL / x            PHYSICAL EXAM  Vital Signs Last 24 Hrs  T(C): 36.4 (26 May 2018 08:00), Max: 36.4 (26 May 2018 08:00)  T(F): 97.5 (26 May 2018 08:00), Max: 97.5 (26 May 2018 08:00)  HR: 91 (26 May 2018 10:00) (64 - 91)  BP: 110/75 (26 May 2018 10:00) (89/64 - 131/85)  BP(mean): 84 (26 May 2018 10:00) (70 - 91)  RR: 16 (26 May 2018 10:00) (9 - 22)  SpO2: 100% (26 May 2018 10:00) (99% - 100%)    Heart: normal S1, S2, RRR, no m/r/g  Lungs: cta bilaterally  Abd: soft nT, nD  Ext: 2+ pitting edema bilaterally; warm to touch     TELEMETRY: 	    ECG:  	  RADIOLOGY:   < from: Xray Chest 1 View- PORTABLE-Urgent (05.26.18 @ 10:32) >  INTERPRETATION:     May 26 at 7:15 AM:  [place]ill-Radha catheter seen with its tip in the right interlobar artery   slightly advanced since the last study.    Lungs are clear, heart is enlarged and no effusion or pneumothorax.    9:39 AM:  Tip of the Hargill-Radha catheter has been pulled back so it is now in the   right main pulmonary artery segment. Left-sided AICD unchanged, stable   cardiomegaly and clear lungs.      COMPARISON:  May 25      IMPRESSION:  Follow-up studies post Hargill-Radha catheter placement and     < end of copied text >    DIAGNOSTIC TESTING:  [ ] Echocardiogram: < from: TTE with Doppler (w/Cont) (04.26.18 @ 20:04) >  CONCLUSIONS:  1. Mitral annular calcification, otherwise normal mitral  valve. Moderate mitral regurgitation.  2. Moderately dilated left atrium.  LA volume index = 47  cc/m2.  3. Mild left ventricular enlargement.  4. Severe global left ventricular systolic dysfunction.  Endocardial visualization enhanced with intravenous  injection of echo contrast (Definity).  No LV thrombus  seen.  5. Right ventricular enlargement with decreased right  ventricular systolic function.  A device wire is noted in  the right heart.  6. Estimated right ventricular systolic pressure equals 42  mm Hg, assuming right atrial pressure equals 10 mm Hg,  consistent with mild pulmonary hypertension.  7.Normal tricuspid valve.  Severe tricuspid regurgitation.  *** Compared with echocardiogram of 5/16/2017, left  ventricular systolic function has deteriorated.    < end of copied text >    [ ]  Catheterization:  < from: Cardiac Cath Lab (01.08.14 @ 16:49) >  CORONARY VESSELS: The coronary circulation is co-dominant.  LM:   -- LM: Normal.  LAD:   --  LAD: Normal.  CX:   --  Circumflex: Normal.  RCA:   --  RCA: Normal.  COMPLICATIONS: There were no complications.    < end of copied text >    [ ] Stress Test:    OTHER: 	      ASSESSMENT/PLAN: 	62y Female with severe NICM s/p AICD, HTN, DM admitted with acute on chronic systolic heart failure.   -Pt still volume overloaded but warm to touch   -c/w  Bumex gtt  -pt chest pressure likely secondary to heart failure - given negative enzymes and prior cardiac cath in 2014 showing normal coronary arteries, no urgent coronary angiogram needed at this time  -management of heart failure per cardiology  -follow up renal  -cont care ccu

## 2018-05-26 NOTE — PROGRESS NOTE ADULT - SUBJECTIVE AND OBJECTIVE BOX
Patient denies CP, Breathin g a little better.  S/p cath Review of systems otherwise (-)  	  aspirin enteric coated 81 milliGRAM(s) Oral daily  buMETAnide Infusion 1 mG/Hr IV Continuous <Continuous>  calcitriol   Capsule 0.25 MICROGram(s) Oral at bedtime  calcium acetate 1334 milliGRAM(s) Oral three times a day with meals  calcium carbonate 500 mG (Tums) Chewable 1 Tablet(s) Chew two times a day  chlorhexidine 4% Liquid 1 Application(s) Topical <User Schedule>  cholecalciferol 1000 Unit(s) Oral daily  dextrose 40% Gel 15 Gram(s) Oral once PRN  dextrose 5%. 1000 milliLiter(s) IV Continuous <Continuous>  dextrose 50% Injectable 12.5 Gram(s) IV Push once  dextrose 50% Injectable 25 Gram(s) IV Push once  dextrose 50% Injectable 25 Gram(s) IV Push once  DOBUTamine Infusion 5 MICROgram(s)/kG/Min IV Continuous <Continuous>  famotidine    Tablet 20 milliGRAM(s) Oral two times a day  glucagon  Injectable 1 milliGRAM(s) IntraMuscular once PRN  heparin  Injectable 5000 Unit(s) SubCutaneous every 12 hours  hydrALAZINE 25 milliGRAM(s) Oral every 8 hours  insulin lispro (HumaLOG) corrective regimen sliding scale   SubCutaneous three times a day before meals  insulin lispro (HumaLOG) corrective regimen sliding scale   SubCutaneous at bedtime  levothyroxine 150 MICROGram(s) Oral daily  magnesium oxide 400 milliGRAM(s) Oral two times a day with meals  sodium chloride 0.9% lock flush 3 milliLiter(s) IV Push every 8 hours                            11.2   7.33  )-----------( 155      ( 26 May 2018 06:16 )             33.3       Hemoglobin: 11.2 g/dL (05-26 @ 06:16)  Hemoglobin: 12.1 g/dL (05-25 @ 07:20)  Hemoglobin: 11.8 g/dL (05-24 @ 07:15)  Hemoglobin: 12.3 g/dL (05-23 @ 15:40)      05-26    132<L>  |  84<L>  |  26<H>  ----------------------------<  139<H>  4.0   |  33<H>  |  1.22    Ca    6.7<L>      26 May 2018 06:16  Phos  5.4     05-26  Mg     2.0     05-26    TPro  6.3  /  Alb  2.9<L>  /  TBili  3.8<H>  /  DBili  x   /  AST  48<H>  /  ALT  48<H>  /  AlkPhos  266<H>  05-26    Creatinine Trend: 1.22<--, 1.23<--, 1.13<--, 1.39<--, 1.54<--, 1.42<--    COAGS:     CARDIAC MARKERS ( 24 May 2018 07:15 )  x     / < 0.06 ng/mL / 500 u/L / x     / x      CARDIAC MARKERS ( 24 May 2018 01:20 )  x     / < 0.06 ng/mL / 483 u/L / x     / x      CARDIAC MARKERS ( 23 May 2018 15:40 )  x     / < 0.06 ng/mL / 559 u/L / 3.13 ng/mL / x            T(C): 36.4 (05-26-18 @ 08:00), Max: 36.4 (05-26-18 @ 08:00)  HR: 91 (05-26-18 @ 10:00) (64 - 91)  BP: 110/75 (05-26-18 @ 10:00) (89/64 - 131/85)  RR: 16 (05-26-18 @ 10:00) (9 - 22)  SpO2: 100% (05-26-18 @ 10:00) (99% - 100%)  Wt(kg): --    I&O's Summary    25 May 2018 07:01  -  26 May 2018 07:00  --------------------------------------------------------  IN: 1296.7 mL / OUT: 5110 mL / NET: -3813.3 mL    26 May 2018 07:01  -  26 May 2018 11:46  --------------------------------------------------------  IN: 388.4 mL / OUT: 1950 mL / NET: -1561.6 mL          HEENT:   Normal oral mucosa, PERRL, EOMI	  Lymphatic: No obvious lymphadenopathy , (+) edema  Cardiovascular: Normal S1 S2, No JVD, 1/6 ROSALINDA murmur, Peripheral pulses palpable 2+ bilaterally  Respiratory: Lungs clear to auscultation, normal effort 	  Gastrointestinal:  Soft, Non-tender, + BS	  Skin: No rashes,  No cyanosis, warm to touch  Musculoskeletal: Normal range of motion, normal strength  Psychiatry:  Appropriate Mood & affect     TELEMETRY: Vpaced	         ASSESSMENT/PLAN: 	62y Female  with severe NICM s/p MDT AICD, HTN, DM, history of thyroid CA, obesity   Admitted with acute on chronic systolic heart failure.     -  Cardiac index remains low by thermodilution, please repeat by JACKSON as the patient has severe TR  - Diuresing well  - Cont bumex for now    Augusto Grimes MD, FACC

## 2018-05-26 NOTE — CHART NOTE - NSCHARTNOTEFT_GEN_A_CORE
[place] Radha catheter repositioned this am, confirmed via CXR. CO/CI calculated at that time via thermodilution with CO 2.9 and CI 1.3. CVP 24, PCWP 27, SVR 1920. Continues on Dobutamine 5 mcg and Bumex gtt at 1 mg/hr.  Labs repeated at 1230: CO/CI calculated via JACKSON with CO 4.43 and CI 1.91, SVR 1100. PCWP not measured at that time. Continues to diurese well.   all reviewed with cardio fellow.  Will plan to repeat [place] readings and labs at 1800. [place] Radha catheter repositioned this am, confirmed via CXR. CO/CI calculated at that time via thermodilution with CO 2.9 and CI 1.3. CVP 24, PCWP 27, SVR 1920. Continues on Dobutamine 5 mcg and Bumex gtt at 1 mg/hr.  Labs repeated at 1230: CO/CI calculated via JACKSON with CO 4.43 and CI 1.91, SVR 1100. PCWP not measured at that time. Continues to diurese well,   all reviewed with cardio fellow. Uptitrating hydralazine with each scheduled dose as BP tolerates.  Will plan to repeat [place] readings and labs at 1800.

## 2018-05-26 NOTE — CHART NOTE - NSCHARTNOTEFT_GEN_A_CORE
Patient continues to diurese well on Bumex gtt @ 1mg/hr and Dobutamine gtt @ 5 mcg. JACKSON calculated with 6 PM labs: CO 4.86, CI 2.09, CVP now 17 (down from 24 this am), and SVR 1135. Lactate 1.8. VS remain stable, uptitrating hydralazine. Plan for 37.5 mg po at 10 PM. Net negative output this shift 5L. K and Mg repleted as indicated. Continue current management. Patient continues to diurese well on Bumex gtt @ 1mg/hr and Dobutamine gtt @ 5 mcg. JACKSON calculated with 6 PM labs: CO 4.86, CI 2.09, CVP now 17 (down from 24 this am), and SVR 1135 (down from 1920 this am). Lactate 1.8. VS remain stable, uptitrating hydralazine. Plan for 37.5 mg po at 10 PM and will continue Q8H. Net negative output this shift 5L. K and Mg repleted as indicated. Continue current management. Patient continues to diurese well on Bumex gtt @ 1mg/hr and Dobutamine gtt @ 5 mcg. JACKSON calculated with 6 PM labs: CO 4.86, CI 2.09, CVP now 17 (down from 24 this am), and SVR 1135 (down from 1920 this am). Lactate 1.8. VS remain stable, uptitrating hydralazine. Plan for 37.5 mg po at 10 PM and will continue Q8H. Net negative output this shift 5L. K and Mg repleted as indicated. Previously D/W Dr. Mckeon and no change in current management.

## 2018-05-26 NOTE — PROGRESS NOTE ADULT - ASSESSMENT
62F with severe NICM, s/p MDT AICD (EF 11%), HTN, DM, history of thyroid CA, and obesity who was admitted with acute on chronic systolic heart failure and transferred to the CCU for further management of cardiogenic shock with inotrope assisted diuresis.

## 2018-05-26 NOTE — PROGRESS NOTE ADULT - SUBJECTIVE AND OBJECTIVE BOX
Subjective/Objective: Patient resting comfortably at present, no overnight events.    MEDICATIONS  (STANDING):  aspirin enteric coated 81 milliGRAM(s) Oral daily  buMETAnide Infusion 1 mG/Hr (10 mL/Hr) IV Continuous <Continuous>  calcitriol   Capsule 0.25 MICROGram(s) Oral at bedtime  calcium acetate 1334 milliGRAM(s) Oral three times a day with meals  calcium carbonate 500 mG (Tums) Chewable 1 Tablet(s) Chew two times a day  chlorhexidine 4% Liquid 1 Application(s) Topical <User Schedule>  cholecalciferol 1000 Unit(s) Oral daily  colchicine 0.6 milliGRAM(s) Oral two times a day  dextrose 5%. 1000 milliLiter(s) (50 mL/Hr) IV Continuous <Continuous>  dextrose 50% Injectable 12.5 Gram(s) IV Push once  dextrose 50% Injectable 25 Gram(s) IV Push once  dextrose 50% Injectable 25 Gram(s) IV Push once  DOBUTamine Infusion 5 MICROgram(s)/kG/Min (17.055 mL/Hr) IV Continuous <Continuous>  famotidine    Tablet 20 milliGRAM(s) Oral two times a day  heparin  Injectable 5000 Unit(s) SubCutaneous every 12 hours  hydrALAZINE 10 milliGRAM(s) Oral every 8 hours  insulin lispro (HumaLOG) corrective regimen sliding scale   SubCutaneous three times a day before meals  insulin lispro (HumaLOG) corrective regimen sliding scale   SubCutaneous at bedtime  levothyroxine 150 MICROGram(s) Oral daily  magnesium oxide 400 milliGRAM(s) Oral two times a day with meals  sodium chloride 0.9% lock flush 3 milliLiter(s) IV Push every 8 hours    MEDICATIONS  (PRN):  dextrose 40% Gel 15 Gram(s) Oral once PRN Blood Glucose LESS THAN 70 milliGRAM(s)/deciliter  glucagon  Injectable 1 milliGRAM(s) IntraMuscular once PRN Glucose LESS THAN 70 milligrams/deciliter          Vital Signs Last 24 Hrs  T(C): 36.2 (26 May 2018 04:00), Max: 36.2 (26 May 2018 04:00)  T(F): 97.2 (26 May 2018 04:00), Max: 97.2 (26 May 2018 04:00)  HR: 83 (26 May 2018 07:00) (64 - 87)  BP: 104/75 (26 May 2018 07:00) (89/64 - 131/85)  BP(mean): 82 (26 May 2018 07:00) (70 - 91)  RR: 9 (26 May 2018 07:00) (9 - 22)  SpO2: 100% (26 May 2018 07:00) (99% - 100%)  I&O's Detail    25 May 2018 07:01  -  26 May 2018 07:00  --------------------------------------------------------  IN:    bumetanide Infusion: 100 mL    DOBUTamine Infusion: 52.8 mL    DOBUTamine Infusion: 153.9 mL    IV PiggyBack: 500 mL    Oral Fluid: 490 mL  Total IN: 1296.7 mL    OUT:    Indwelling Catheter - Urethral: 5010 mL    Voided: 100 mL  Total OUT: 5110 mL    Total NET: -3813.3 mL      PHYSICAL EXAM  GEN: NAD< skin W & D  RESP: CTA ant  CV: nl S1S2, ? S3  GI: soft, NT/ND, BS +  EXT: 2+ B/L LE edema  NEURO: A & O X 3    EKG/ TELEM: ventricular paced rhythm    LABS:                          11.2   7.33  )-----------( 155      ( 26 May 2018 06:16 )             33.3       26 May 2018 06:16    132<L>  |  84<L>  |  26<H>  ----------------------------<  139<H>  4.0     |  33<H>  |  1.22     26 May 2018 00:10    131<L>  |  83<L>  |  28<H>  ----------------------------<  149<H>  3.3<L>   |  31     |  1.23     Ca    6.7<L>      26 May 2018 06:16  Ca    6.8<L>      26 May 2018 00:10  Phos  5.4<H>     26 May 2018 06:16  Phos  5.5<H>     26 May 2018 00:10  Mg     2.0       26 May 2018 06:16  Mg     1.4<L>     26 May 2018 00:10    TPro  6.3    /  Alb  2.9<L>  /  TBili  3.8<H>  /  DBili  x      /  AST  48<H>  /  ALT  48<H>  /  AlkPhos  266<H>  26 May 2018 06:16  TPro  6.3    /  Alb  2.8<L>  /  TBili  3.9<H>  /  DBili  x      /  AST  53<H>  /  ALT  51<H>  /  AlkPhos  269<H>  26 May 2018 00:10        Creatine Kinase, Serum: 500 u/L (05-24-18 @ 07:15)  Creatine Kinase, Serum: 483 u/L (05-24-18 @ 01:20)  Creatine Kinase, Serum: 559 u/L (05-23-18 @ 15:40)    CKMB: 3.13 ng/mL (05-23-18 @ 15:40)    Troponin T, Serum: < 0.06 ng/mL (05-24-18 @ 07:15)  Troponin T, Serum: < 0.06 ng/mL (05-24-18 @ 01:20)  Troponin T, Serum: < 0.06 ng/mL (05-23-18 @ 15:40)

## 2018-05-26 NOTE — PROGRESS NOTE ADULT - SUBJECTIVE AND OBJECTIVE BOX
pt seen and examined, no complaints on exam.     aspirin enteric coated 81 milliGRAM(s) Oral daily  buMETAnide Infusion 1 mG/Hr IV Continuous <Continuous>  calcitriol   Capsule 0.25 MICROGram(s) Oral at bedtime  calcium acetate 1334 milliGRAM(s) Oral three times a day with meals  calcium carbonate 500 mG (Tums) Chewable 1 Tablet(s) Chew two times a day  chlorhexidine 4% Liquid 1 Application(s) Topical <User Schedule>  cholecalciferol 1000 Unit(s) Oral daily  colchicine 0.6 milliGRAM(s) Oral two times a day  dextrose 40% Gel 15 Gram(s) Oral once PRN  dextrose 5%. 1000 milliLiter(s) IV Continuous <Continuous>  dextrose 50% Injectable 12.5 Gram(s) IV Push once  dextrose 50% Injectable 25 Gram(s) IV Push once  dextrose 50% Injectable 25 Gram(s) IV Push once  DOBUTamine Infusion 5 MICROgram(s)/kG/Min IV Continuous <Continuous>  famotidine    Tablet 20 milliGRAM(s) Oral two times a day  glucagon  Injectable 1 milliGRAM(s) IntraMuscular once PRN  heparin  Injectable 5000 Unit(s) SubCutaneous every 12 hours  hydrALAZINE 10 milliGRAM(s) Oral every 8 hours  insulin lispro (HumaLOG) corrective regimen sliding scale   SubCutaneous three times a day before meals  insulin lispro (HumaLOG) corrective regimen sliding scale   SubCutaneous at bedtime  levothyroxine 150 MICROGram(s) Oral daily  magnesium oxide 400 milliGRAM(s) Oral two times a day with meals  sodium chloride 0.9% lock flush 3 milliLiter(s) IV Push every 8 hours                            11.2   7.33  )-----------( 155      ( 26 May 2018 06:16 )             33.3       Hemoglobin: 11.2 g/dL (05-26 @ 06:16)  Hemoglobin: 12.1 g/dL (05-25 @ 07:20)  Hemoglobin: 11.8 g/dL (05-24 @ 07:15)  Hemoglobin: 12.3 g/dL (05-23 @ 15:40)      05-26    132<L>  |  84<L>  |  26<H>  ----------------------------<  139<H>  4.0   |  33<H>  |  1.22    Ca    6.7<L>      26 May 2018 06:16  Phos  5.4     05-26  Mg     2.0     05-26    TPro  6.3  /  Alb  2.9<L>  /  TBili  3.8<H>  /  DBili  x   /  AST  48<H>  /  ALT  48<H>  /  AlkPhos  266<H>  05-26    Creatinine Trend: 1.22<--, 1.23<--, 1.13<--, 1.39<--, 1.54<--, 1.42<--    COAGS:     CARDIAC MARKERS ( 24 May 2018 07:15 )  x     / < 0.06 ng/mL / 500 u/L / x     / x      CARDIAC MARKERS ( 24 May 2018 01:20 )  x     / < 0.06 ng/mL / 483 u/L / x     / x      CARDIAC MARKERS ( 23 May 2018 15:40 )  x     / < 0.06 ng/mL / 559 u/L / 3.13 ng/mL / x            T(C): 36.2 (05-26-18 @ 04:00), Max: 36.2 (05-26-18 @ 04:00)  HR: 84 (05-26-18 @ 06:00) (64 - 87)  BP: 99/70 (05-26-18 @ 06:00) (89/64 - 131/85)  RR: 22 (05-26-18 @ 06:00) (13 - 22)  SpO2: 100% (05-26-18 @ 06:00) (99% - 100%)  Wt(kg): --    I&O's Summary    24 May 2018 07:01  -  25 May 2018 07:00  --------------------------------------------------------  IN: 450 mL / OUT: 1150 mL / NET: -700 mL    25 May 2018 07:01  -  26 May 2018 06:55  --------------------------------------------------------  IN: 1296.7 mL / OUT: 4585 mL / NET: -3288.3 mL        HEENT:   Normal oral mucosa, PERRL, EOMI	  Lymphatic: No obvious lymphadenopathy , (+) edema  Cardiovascular: Normal S1 S2, No JVD, 1/6 ROSALINDA murmur, Peripheral pulses palpable 2+ bilaterally  Respiratory: Lungs clear to auscultation, normal effort 	  Gastrointestinal:  Soft, Non-tender, + BS	  Skin: No rashes,  No cyanosis, warm to touch  Musculoskeletal: Normal range of motion, normal strength  Psychiatry:  Appropriate Mood & affect     TELEMETRY: Vpaced	         ASSESSMENT/PLAN: 	62 year old Female with past medical history of hypertension, hyperlipidemia, chronic RBBB, obesity, thyroid CA s/p thyroidectomy, hypocalcemia (managed by OP endo Dr murray), nonischemic cardiomyopathy with an ejection fraction of 11% and  normal coronaries on left heart cardiac catheterization 2014 , s/p [employer] Claria MRI CRT-ICD placement in June 2017, admitted last month with acute on chronic systolic HF exacerbation, presenting today with SOB, volume overload, acute on chronic systolic HF exacerbation    --Last interrogation noted 99% BIV pacing with normal function, will review that her BIVICD is optimized  --needs aggressive diuresis-   -- no further EP work up necessary at this time

## 2018-05-26 NOTE — PROGRESS NOTE ADULT - SUBJECTIVE AND OBJECTIVE BOX
NEPHROLOGY - NSN    Patient seen and examined.    MEDICATIONS  (STANDING):  aspirin enteric coated 81 milliGRAM(s) Oral daily  buMETAnide Infusion 1 mG/Hr (10 mL/Hr) IV Continuous <Continuous>  calcitriol   Capsule 0.25 MICROGram(s) Oral at bedtime  calcium acetate 1334 milliGRAM(s) Oral three times a day with meals  calcium carbonate 500 mG (Tums) Chewable 1 Tablet(s) Chew two times a day  chlorhexidine 4% Liquid 1 Application(s) Topical <User Schedule>  cholecalciferol 1000 Unit(s) Oral daily  dextrose 5%. 1000 milliLiter(s) (50 mL/Hr) IV Continuous <Continuous>  dextrose 50% Injectable 12.5 Gram(s) IV Push once  dextrose 50% Injectable 25 Gram(s) IV Push once  dextrose 50% Injectable 25 Gram(s) IV Push once  DOBUTamine Infusion 5 MICROgram(s)/kG/Min (17.055 mL/Hr) IV Continuous <Continuous>  famotidine    Tablet 20 milliGRAM(s) Oral two times a day  gabapentin 100 milliGRAM(s) Oral every 8 hours  heparin  Injectable 5000 Unit(s) SubCutaneous every 12 hours  hydrALAZINE 25 milliGRAM(s) Oral every 8 hours  insulin lispro (HumaLOG) corrective regimen sliding scale   SubCutaneous three times a day before meals  insulin lispro (HumaLOG) corrective regimen sliding scale   SubCutaneous at bedtime  levothyroxine 150 MICROGram(s) Oral daily  magnesium oxide 400 milliGRAM(s) Oral two times a day with meals  sodium chloride 0.9% lock flush 3 milliLiter(s) IV Push every 8 hours    VITALS:  T(C): , Max: 36.4 (18 @ 08:00)  T(F): , Max: 97.5 (18 @ 08:00)  HR: 89 (18 @ 13:00)  BP: 109/74 (18 @ 13:00)  BP(mean): 82 (18 @ 13:00)  RR: 22 (18 @ 13:00)  SpO2: 98% (18 @ 13:00)  Wt(kg): --  I and O's:     @ 07:01  -   @ 07:00  --------------------------------------------------------  IN: 1296.7 mL / OUT: 5110 mL / NET: -3813.3 mL     @ 07:01  -   @ 15:00  --------------------------------------------------------  IN: 689.7 mL / OUT: 4300 mL / NET: -3610.3 mL          REVIEW OF SYSTEMS:  Denies any nausea, vomiting, diarrhea, fever or chills. Denies chest pain, SOB, focal weakness, hematuria or dysuria. Good oral intake and denies fatigue or weakness. All other pertinent systems are reviewed and are negative.     PHYSICAL EXAM:  Constitutional: NAD  Respiratory: CTA B/L  Cardiovascular: S1 and S2  Gastrointestinal: BS+, soft, NT/ND  Extremities: No peripheral edema  Neurological: AAO x 3  : No Gustafson  Access: Not applicable    LABS:                        11.2   6.95  )-----------( 145      ( 26 May 2018 12:30 )             33.2         132<L>  |  83<L>  |  22  ----------------------------<  192<H>  3.4<L>   |  32<H>  |  1.07    Ca    6.8<L>      26 May 2018 13:17  Phos  4.6       Mg     1.9         TPro  6.3  /  Alb  2.9<L>  /  TBili  3.8<H>  /  DBili  x   /  AST  48<H>  /  ALT  48<H>  /  AlkPhos  266<H>        Urine Studies:  Urinalysis Basic - ( 25 May 2018 15:43 )    Color: PLYEL / Appearance: CLEAR / S.006 / pH: 7.0  Gluc: NEGATIVE / Ketone: NEGATIVE  / Bili: NEGATIVE / Urobili: NORMAL mg/dL   Blood: SMALL / Protein: NEGATIVE mg/dL / Nitrite: NEGATIVE   Leuk Esterase: NEGATIVE / RBC: 0-2 / WBC 0-2   Sq Epi: x / Non Sq Epi: x / Bacteria: x          RADIOLOGY & ADDITIONAL STUDIES: NEPHROLOGY - NSN    Patient seen and examined. [place] repositioned. Bumex and  titrated o/n     MEDICATIONS  (STANDING):  aspirin enteric coated 81 milliGRAM(s) Oral daily  buMETAnide Infusion 1 mG/Hr (10 mL/Hr) IV Continuous <Continuous>  calcitriol   Capsule 0.25 MICROGram(s) Oral at bedtime  calcium acetate 1334 milliGRAM(s) Oral three times a day with meals  calcium carbonate 500 mG (Tums) Chewable 1 Tablet(s) Chew two times a day  chlorhexidine 4% Liquid 1 Application(s) Topical <User Schedule>  cholecalciferol 1000 Unit(s) Oral daily  dextrose 5%. 1000 milliLiter(s) (50 mL/Hr) IV Continuous <Continuous>  dextrose 50% Injectable 12.5 Gram(s) IV Push once  dextrose 50% Injectable 25 Gram(s) IV Push once  dextrose 50% Injectable 25 Gram(s) IV Push once  DOBUTamine Infusion 5 MICROgram(s)/kG/Min (17.055 mL/Hr) IV Continuous <Continuous>  famotidine    Tablet 20 milliGRAM(s) Oral two times a day  gabapentin 100 milliGRAM(s) Oral every 8 hours  heparin  Injectable 5000 Unit(s) SubCutaneous every 12 hours  hydrALAZINE 25 milliGRAM(s) Oral every 8 hours  insulin lispro (HumaLOG) corrective regimen sliding scale   SubCutaneous three times a day before meals  insulin lispro (HumaLOG) corrective regimen sliding scale   SubCutaneous at bedtime  levothyroxine 150 MICROGram(s) Oral daily  magnesium oxide 400 milliGRAM(s) Oral two times a day with meals  sodium chloride 0.9% lock flush 3 milliLiter(s) IV Push every 8 hours    VITALS:  T(C): , Max: 36.4 (18 @ 08:00)  T(F): , Max: 97.5 (18 @ 08:00)  HR: 89 (18 @ 13:00)  BP: 109/74 (18 @ 13:00)  BP(mean): 82 (18 @ 13:00)  RR: 22 (18 @ 13:00)  SpO2: 98% (18 @ 13:00)  Wt(kg): --  I and O's:    05-25 @ 07: @ 07:00  --------------------------------------------------------  IN: 1296.7 mL / OUT: 5110 mL / NET: -3813.3 mL     @ 07: @ 15:00  --------------------------------------------------------  IN: 689.7 mL / OUT: 4300 mL / NET: -3610.3 mL        PHYSICAL EXAM:  Constitutional: NAD  Respiratory: diminished B/L  Cardiovascular: S1 and S2  Gastrointestinal: BS+, soft, NT/ND  Extremities: ++ edema  Neurological: AAO x 3  : + Gustafson  Access: Not applicable    LABS:                        11.2   6.95  )-----------( 145      ( 26 May 2018 12:30 )             33.2         132<L>  |  83<L>  |  22  ----------------------------<  192<H>  3.4<L>   |  32<H>  |  1.07    Ca    6.8<L>      26 May 2018 13:17  Phos  4.6       Mg     1.9         TPro  6.3  /  Alb  2.9<L>  /  TBili  3.8<H>  /  DBili  x   /  AST  48<H>  /  ALT  48<H>  /  AlkPhos  266<H>        Urine Studies:  Urinalysis Basic - ( 25 May 2018 15:43 )    Color: PLYEL / Appearance: CLEAR / S.006 / pH: 7.0  Gluc: NEGATIVE / Ketone: NEGATIVE  / Bili: NEGATIVE / Urobili: NORMAL mg/dL   Blood: SMALL / Protein: NEGATIVE mg/dL / Nitrite: NEGATIVE   Leuk Esterase: NEGATIVE / RBC: 0-2 / WBC 0-2   Sq Epi: x / Non Sq Epi: x / Bacteria: x          RADIOLOGY & ADDITIONAL STUDIES:

## 2018-05-26 NOTE — PROGRESS NOTE ADULT - PROBLEM SELECTOR PLAN 1
s/p RHC, continues on Dobutamine gtt at 5 mcg. Hydralazine added and uptitrate as BP tolerates. PCWP 27 and CVP 24 this am.

## 2018-05-26 NOTE — PROGRESS NOTE ADULT - ASSESSMENT
62yFemale with severe NICM s/p MDT AICD, HTN, DM, history of thyroid CA, obesity admitted with acute on chronic systolic heart failure.      Problem/Plan - 1:  ·  Problem: Acute on chronic systolic congestive heart failure with severe Cardiomyopathy S/P AICD  .  Plan: S/P RHC. On Bumex and Diuresing well . HF team recommending evaluation for LVAD/Transplant .       Problem/Plan - 2:  ·  Problem: Cardiogenic Shock .  Plan: S/P RHC. On Dobutamine.     Problem/Plan - 3:  ·  Problem: Essential hypertension.  Plan: BP readings fine. Low salt diet.  Continue BP meds.      Problem/Plan - 4:  ·  Problem: Diabetes mellitus, type 2.  Plan: Sugars in acceptable range .   FS QID  HISS   DM diet.      Problem/Plan - 5:  ·  Problem: Hypocalcemia .  Plan :Replacing. Renal helping.      Problem/Plan -6:  ·  Problem: Abnormal LFT  .  Plan : Secondary to Hepatic congestion from CHF . GI consulted.      Problem/Plan -7:  ·  Problem: MAGNUS  .  Plan : Resolved. Renal helping.       Problem/Plan -8:  Problem: Need for prophylactic measure. Plan: VTE with Heparin 5000U sub cut BID.  Fall, Aspirations and safety precautions,

## 2018-05-27 LAB
ALBUMIN SERPL ELPH-MCNC: 3 G/DL — LOW (ref 3.3–5)
ALBUMIN SERPL ELPH-MCNC: 3.2 G/DL — LOW (ref 3.3–5)
ALP SERPL-CCNC: 254 U/L — HIGH (ref 40–120)
ALP SERPL-CCNC: 263 U/L — HIGH (ref 40–120)
ALT FLD-CCNC: 45 U/L — HIGH (ref 4–33)
ALT FLD-CCNC: 46 U/L — HIGH (ref 4–33)
AST SERPL-CCNC: 47 U/L — HIGH (ref 4–32)
AST SERPL-CCNC: 47 U/L — HIGH (ref 4–32)
BASE EXCESS BLDA CALC-SCNC: 12.4 MMOL/L — SIGNIFICANT CHANGE UP
BASE EXCESS BLDV CALC-SCNC: 13.8 MMOL/L — SIGNIFICANT CHANGE UP
BASE EXCESS BLDV CALC-SCNC: 14.1 MMOL/L — SIGNIFICANT CHANGE UP
BASE EXCESS BLDV CALC-SCNC: 14.4 MMOL/L — SIGNIFICANT CHANGE UP
BILIRUB SERPL-MCNC: 3.8 MG/DL — HIGH (ref 0.2–1.2)
BILIRUB SERPL-MCNC: 3.9 MG/DL — HIGH (ref 0.2–1.2)
BLOOD GAS VENOUS - CREATININE: 0.81 MG/DL — SIGNIFICANT CHANGE UP (ref 0.5–1.3)
BLOOD GAS VENOUS - CREATININE: 0.93 MG/DL — SIGNIFICANT CHANGE UP (ref 0.5–1.3)
BLOOD GAS VENOUS - CREATININE: 1 MG/DL — SIGNIFICANT CHANGE UP (ref 0.5–1.3)
BUN SERPL-MCNC: 15 MG/DL — SIGNIFICANT CHANGE UP (ref 7–23)
BUN SERPL-MCNC: 16 MG/DL — SIGNIFICANT CHANGE UP (ref 7–23)
BUN SERPL-MCNC: 19 MG/DL — SIGNIFICANT CHANGE UP (ref 7–23)
BUN SERPL-MCNC: 22 MG/DL — SIGNIFICANT CHANGE UP (ref 7–23)
CA-I BLDA-SCNC: 0.79 MMOL/L — LOW (ref 1.15–1.29)
CALCIUM SERPL-MCNC: 6 MG/DL — CRITICAL LOW (ref 8.4–10.5)
CALCIUM SERPL-MCNC: 6.1 MG/DL — CRITICAL LOW (ref 8.4–10.5)
CALCIUM SERPL-MCNC: 6.2 MG/DL — CRITICAL LOW (ref 8.4–10.5)
CALCIUM SERPL-MCNC: 6.6 MG/DL — LOW (ref 8.4–10.5)
CHLORIDE BLDV-SCNC: 87 MMOL/L — LOW (ref 96–108)
CHLORIDE BLDV-SCNC: 87 MMOL/L — LOW (ref 96–108)
CHLORIDE BLDV-SCNC: 88 MMOL/L — LOW (ref 96–108)
CHLORIDE SERPL-SCNC: 83 MMOL/L — LOW (ref 98–107)
CHLORIDE SERPL-SCNC: 84 MMOL/L — LOW (ref 98–107)
CHLORIDE SERPL-SCNC: 85 MMOL/L — LOW (ref 98–107)
CHLORIDE SERPL-SCNC: 87 MMOL/L — LOW (ref 98–107)
CO2 SERPL-SCNC: 34 MMOL/L — HIGH (ref 22–31)
CO2 SERPL-SCNC: 35 MMOL/L — HIGH (ref 22–31)
CO2 SERPL-SCNC: 35 MMOL/L — HIGH (ref 22–31)
CO2 SERPL-SCNC: 36 MMOL/L — HIGH (ref 22–31)
CREAT SERPL-MCNC: 0.93 MG/DL — SIGNIFICANT CHANGE UP (ref 0.5–1.3)
CREAT SERPL-MCNC: 1.02 MG/DL — SIGNIFICANT CHANGE UP (ref 0.5–1.3)
CREAT SERPL-MCNC: 1.03 MG/DL — SIGNIFICANT CHANGE UP (ref 0.5–1.3)
CREAT SERPL-MCNC: 1.08 MG/DL — SIGNIFICANT CHANGE UP (ref 0.5–1.3)
GAS PNL BLDV: 130 MMOL/L — LOW (ref 136–146)
GAS PNL BLDV: 131 MMOL/L — LOW (ref 136–146)
GAS PNL BLDV: 132 MMOL/L — LOW (ref 136–146)
GLUCOSE BLDA-MCNC: 189 MG/DL — HIGH (ref 70–99)
GLUCOSE BLDV-MCNC: 130 — HIGH (ref 70–99)
GLUCOSE BLDV-MCNC: 181 — HIGH (ref 70–99)
GLUCOSE BLDV-MCNC: 218 — HIGH (ref 70–99)
GLUCOSE SERPL-MCNC: 128 MG/DL — HIGH (ref 70–99)
GLUCOSE SERPL-MCNC: 135 MG/DL — HIGH (ref 70–99)
GLUCOSE SERPL-MCNC: 183 MG/DL — HIGH (ref 70–99)
GLUCOSE SERPL-MCNC: 209 MG/DL — HIGH (ref 70–99)
HCO3 BLDA-SCNC: 36 MMOL/L — HIGH (ref 22–26)
HCO3 BLDV-SCNC: 36 MMOL/L — HIGH (ref 20–27)
HCO3 BLDV-SCNC: 37 MMOL/L — HIGH (ref 20–27)
HCO3 BLDV-SCNC: 37 MMOL/L — HIGH (ref 20–27)
HCT VFR BLD CALC: 33.8 % — LOW (ref 34.5–45)
HCT VFR BLDA CALC: 35.9 % — SIGNIFICANT CHANGE UP (ref 34.5–46.5)
HCT VFR BLDV CALC: 35.2 % — SIGNIFICANT CHANGE UP (ref 34.5–45)
HCT VFR BLDV CALC: 35.8 % — SIGNIFICANT CHANGE UP (ref 34.5–45)
HCT VFR BLDV CALC: 37.2 % — SIGNIFICANT CHANGE UP (ref 34.5–45)
HGB BLD-MCNC: 11.2 G/DL — LOW (ref 11.5–15.5)
HGB BLDA-MCNC: 11.6 G/DL — SIGNIFICANT CHANGE UP (ref 11.5–15.5)
HGB BLDV-MCNC: 11.4 G/DL — LOW (ref 11.5–15.5)
HGB BLDV-MCNC: 11.6 G/DL — SIGNIFICANT CHANGE UP (ref 11.5–15.5)
HGB BLDV-MCNC: 12.1 G/DL — SIGNIFICANT CHANGE UP (ref 11.5–15.5)
LACTATE BLDA-SCNC: 2.8 MMOL/L — HIGH (ref 0.5–2)
LACTATE BLDV-MCNC: 1.8 MMOL/L — SIGNIFICANT CHANGE UP (ref 0.5–2)
LACTATE BLDV-MCNC: 2.1 MMOL/L — HIGH (ref 0.5–2)
LACTATE BLDV-MCNC: 2.6 MMOL/L — HIGH (ref 0.5–2)
MAGNESIUM SERPL-MCNC: 1.7 MG/DL — SIGNIFICANT CHANGE UP (ref 1.6–2.6)
MAGNESIUM SERPL-MCNC: 1.7 MG/DL — SIGNIFICANT CHANGE UP (ref 1.6–2.6)
MAGNESIUM SERPL-MCNC: 1.9 MG/DL — SIGNIFICANT CHANGE UP (ref 1.6–2.6)
MAGNESIUM SERPL-MCNC: 2.1 MG/DL — SIGNIFICANT CHANGE UP (ref 1.6–2.6)
MCHC RBC-ENTMCNC: 23.7 PG — LOW (ref 27–34)
MCHC RBC-ENTMCNC: 33.1 % — SIGNIFICANT CHANGE UP (ref 32–36)
MCV RBC AUTO: 71.5 FL — LOW (ref 80–100)
NRBC # FLD: 0 — SIGNIFICANT CHANGE UP
PCO2 BLDA: 43 MMHG — SIGNIFICANT CHANGE UP (ref 32–48)
PCO2 BLDV: 51 MMHG — SIGNIFICANT CHANGE UP (ref 41–51)
PCO2 BLDV: 51 MMHG — SIGNIFICANT CHANGE UP (ref 41–51)
PCO2 BLDV: 52 MMHG — HIGH (ref 41–51)
PH BLDA: 7.53 PH — HIGH (ref 7.35–7.45)
PH BLDV: 7.48 PH — HIGH (ref 7.32–7.43)
PH BLDV: 7.49 PH — HIGH (ref 7.32–7.43)
PH BLDV: 7.49 PH — HIGH (ref 7.32–7.43)
PHOSPHATE SERPL-MCNC: 3.1 MG/DL — SIGNIFICANT CHANGE UP (ref 2.5–4.5)
PHOSPHATE SERPL-MCNC: 3.5 MG/DL — SIGNIFICANT CHANGE UP (ref 2.5–4.5)
PHOSPHATE SERPL-MCNC: 3.9 MG/DL — SIGNIFICANT CHANGE UP (ref 2.5–4.5)
PHOSPHATE SERPL-MCNC: 4 MG/DL — SIGNIFICANT CHANGE UP (ref 2.5–4.5)
PLATELET # BLD AUTO: 150 K/UL — SIGNIFICANT CHANGE UP (ref 150–400)
PMV BLD: SIGNIFICANT CHANGE UP FL (ref 7–13)
PO2 BLDA: 66 MMHG — LOW (ref 83–108)
PO2 BLDV: 31 MMHG — LOW (ref 35–40)
PO2 BLDV: 36 MMHG — SIGNIFICANT CHANGE UP (ref 35–40)
PO2 BLDV: 39 MMHG — SIGNIFICANT CHANGE UP (ref 35–40)
POTASSIUM BLDA-SCNC: 3.3 MMOL/L — LOW (ref 3.4–4.5)
POTASSIUM BLDV-SCNC: 2.9 MMOL/L — CRITICAL LOW (ref 3.4–4.5)
POTASSIUM BLDV-SCNC: 3.2 MMOL/L — LOW (ref 3.4–4.5)
POTASSIUM BLDV-SCNC: 3.5 MMOL/L — SIGNIFICANT CHANGE UP (ref 3.4–4.5)
POTASSIUM SERPL-MCNC: 3.3 MMOL/L — LOW (ref 3.5–5.3)
POTASSIUM SERPL-MCNC: 3.5 MMOL/L — SIGNIFICANT CHANGE UP (ref 3.5–5.3)
POTASSIUM SERPL-MCNC: 3.7 MMOL/L — SIGNIFICANT CHANGE UP (ref 3.5–5.3)
POTASSIUM SERPL-MCNC: 4.1 MMOL/L — SIGNIFICANT CHANGE UP (ref 3.5–5.3)
POTASSIUM SERPL-SCNC: 3.3 MMOL/L — LOW (ref 3.5–5.3)
POTASSIUM SERPL-SCNC: 3.5 MMOL/L — SIGNIFICANT CHANGE UP (ref 3.5–5.3)
POTASSIUM SERPL-SCNC: 3.7 MMOL/L — SIGNIFICANT CHANGE UP (ref 3.5–5.3)
POTASSIUM SERPL-SCNC: 4.1 MMOL/L — SIGNIFICANT CHANGE UP (ref 3.5–5.3)
PROT SERPL-MCNC: 6.7 G/DL — SIGNIFICANT CHANGE UP (ref 6–8.3)
PROT SERPL-MCNC: 6.7 G/DL — SIGNIFICANT CHANGE UP (ref 6–8.3)
PTH-INTACT SERPL-MCNC: 15.17 PG/ML — SIGNIFICANT CHANGE UP (ref 15–65)
RBC # BLD: 4.73 M/UL — SIGNIFICANT CHANGE UP (ref 3.8–5.2)
RBC # FLD: 22.9 % — HIGH (ref 10.3–14.5)
SAO2 % BLDA: 95.2 % — SIGNIFICANT CHANGE UP (ref 95–99)
SAO2 % BLDV: 58.7 % — LOW (ref 60–85)
SAO2 % BLDV: 67.2 % — SIGNIFICANT CHANGE UP (ref 60–85)
SAO2 % BLDV: 71.3 % — SIGNIFICANT CHANGE UP (ref 60–85)
SODIUM BLDA-SCNC: 129 MMOL/L — LOW (ref 136–146)
SODIUM SERPL-SCNC: 131 MMOL/L — LOW (ref 135–145)
SODIUM SERPL-SCNC: 133 MMOL/L — LOW (ref 135–145)
SODIUM SERPL-SCNC: 133 MMOL/L — LOW (ref 135–145)
SODIUM SERPL-SCNC: 134 MMOL/L — LOW (ref 135–145)
WBC # BLD: 6.51 K/UL — SIGNIFICANT CHANGE UP (ref 3.8–10.5)
WBC # FLD AUTO: 6.51 K/UL — SIGNIFICANT CHANGE UP (ref 3.8–10.5)

## 2018-05-27 PROCEDURE — 71045 X-RAY EXAM CHEST 1 VIEW: CPT | Mod: 26

## 2018-05-27 PROCEDURE — 99233 SBSQ HOSP IP/OBS HIGH 50: CPT | Mod: GC

## 2018-05-27 RX ORDER — ONDANSETRON 8 MG/1
4 TABLET, FILM COATED ORAL ONCE
Qty: 0 | Refills: 0 | Status: DISCONTINUED | OUTPATIENT
Start: 2018-05-27 | End: 2018-05-27

## 2018-05-27 RX ORDER — MAGNESIUM SULFATE 500 MG/ML
2 VIAL (ML) INJECTION ONCE
Qty: 0 | Refills: 0 | Status: COMPLETED | OUTPATIENT
Start: 2018-05-27 | End: 2018-05-27

## 2018-05-27 RX ORDER — POTASSIUM CHLORIDE 20 MEQ
20 PACKET (EA) ORAL ONCE
Qty: 0 | Refills: 0 | Status: COMPLETED | OUTPATIENT
Start: 2018-05-27 | End: 2018-05-27

## 2018-05-27 RX ORDER — POTASSIUM CHLORIDE 20 MEQ
20 PACKET (EA) ORAL
Qty: 0 | Refills: 0 | Status: COMPLETED | OUTPATIENT
Start: 2018-05-27 | End: 2018-05-27

## 2018-05-27 RX ORDER — ISOSORBIDE DINITRATE 5 MG/1
10 TABLET ORAL EVERY 8 HOURS
Qty: 0 | Refills: 0 | Status: DISCONTINUED | OUTPATIENT
Start: 2018-05-27 | End: 2018-05-29

## 2018-05-27 RX ORDER — CALCIUM CARBONATE 500(1250)
1 TABLET ORAL
Qty: 0 | Refills: 0 | Status: DISCONTINUED | OUTPATIENT
Start: 2018-05-27 | End: 2018-05-31

## 2018-05-27 RX ADMIN — CALCITRIOL 0.25 MICROGRAM(S): 0.5 CAPSULE ORAL at 22:22

## 2018-05-27 RX ADMIN — HEPARIN SODIUM 5000 UNIT(S): 5000 INJECTION INTRAVENOUS; SUBCUTANEOUS at 06:26

## 2018-05-27 RX ADMIN — SODIUM CHLORIDE 3 MILLILITER(S): 9 INJECTION INTRAMUSCULAR; INTRAVENOUS; SUBCUTANEOUS at 05:05

## 2018-05-27 RX ADMIN — SODIUM CHLORIDE 3 MILLILITER(S): 9 INJECTION INTRAMUSCULAR; INTRAVENOUS; SUBCUTANEOUS at 22:23

## 2018-05-27 RX ADMIN — Medication 1334 MILLIGRAM(S): at 09:10

## 2018-05-27 RX ADMIN — Medication 1 TABLET(S): at 06:26

## 2018-05-27 RX ADMIN — HEPARIN SODIUM 5000 UNIT(S): 5000 INJECTION INTRAVENOUS; SUBCUTANEOUS at 18:29

## 2018-05-27 RX ADMIN — Medication 17.05 MICROGRAM(S)/KG/MIN: at 22:23

## 2018-05-27 RX ADMIN — Medication 1000 UNIT(S): at 12:41

## 2018-05-27 RX ADMIN — Medication 1334 MILLIGRAM(S): at 18:30

## 2018-05-27 RX ADMIN — FAMOTIDINE 20 MILLIGRAM(S): 10 INJECTION INTRAVENOUS at 18:30

## 2018-05-27 RX ADMIN — Medication 17.05 MICROGRAM(S)/KG/MIN: at 15:27

## 2018-05-27 RX ADMIN — BUMETANIDE 10 MG/HR: 0.25 INJECTION INTRAMUSCULAR; INTRAVENOUS at 09:18

## 2018-05-27 RX ADMIN — Medication 17.05 MICROGRAM(S)/KG/MIN: at 09:17

## 2018-05-27 RX ADMIN — Medication 37.5 MILLIGRAM(S): at 22:22

## 2018-05-27 RX ADMIN — GABAPENTIN 100 MILLIGRAM(S): 400 CAPSULE ORAL at 06:26

## 2018-05-27 RX ADMIN — Medication 37.5 MILLIGRAM(S): at 13:09

## 2018-05-27 RX ADMIN — Medication 50 MILLIEQUIVALENT(S): at 12:59

## 2018-05-27 RX ADMIN — Medication 50 GRAM(S): at 18:29

## 2018-05-27 RX ADMIN — Medication 100 MILLIEQUIVALENT(S): at 04:00

## 2018-05-27 RX ADMIN — Medication 1 TABLET(S): at 18:35

## 2018-05-27 RX ADMIN — FAMOTIDINE 20 MILLIGRAM(S): 10 INJECTION INTRAVENOUS at 06:26

## 2018-05-27 RX ADMIN — Medication 81 MILLIGRAM(S): at 12:41

## 2018-05-27 RX ADMIN — Medication 100 MILLIEQUIVALENT(S): at 05:01

## 2018-05-27 RX ADMIN — GABAPENTIN 100 MILLIGRAM(S): 400 CAPSULE ORAL at 13:09

## 2018-05-27 RX ADMIN — GABAPENTIN 100 MILLIGRAM(S): 400 CAPSULE ORAL at 22:21

## 2018-05-27 RX ADMIN — ISOSORBIDE DINITRATE 10 MILLIGRAM(S): 5 TABLET ORAL at 18:35

## 2018-05-27 RX ADMIN — Medication 50 MILLIEQUIVALENT(S): at 15:27

## 2018-05-27 RX ADMIN — CHLORHEXIDINE GLUCONATE 1 APPLICATION(S): 213 SOLUTION TOPICAL at 12:43

## 2018-05-27 RX ADMIN — Medication 37.5 MILLIGRAM(S): at 06:28

## 2018-05-27 RX ADMIN — ISOSORBIDE DINITRATE 10 MILLIGRAM(S): 5 TABLET ORAL at 10:24

## 2018-05-27 RX ADMIN — MAGNESIUM OXIDE 400 MG ORAL TABLET 400 MILLIGRAM(S): 241.3 TABLET ORAL at 09:09

## 2018-05-27 RX ADMIN — ISOSORBIDE DINITRATE 10 MILLIGRAM(S): 5 TABLET ORAL at 22:22

## 2018-05-27 RX ADMIN — Medication 1334 MILLIGRAM(S): at 12:41

## 2018-05-27 RX ADMIN — Medication 100 MILLIEQUIVALENT(S): at 09:09

## 2018-05-27 RX ADMIN — Medication 2: at 12:42

## 2018-05-27 RX ADMIN — Medication 50 MILLIEQUIVALENT(S): at 14:11

## 2018-05-27 RX ADMIN — Medication 100 MILLIEQUIVALENT(S): at 02:09

## 2018-05-27 RX ADMIN — Medication 150 MICROGRAM(S): at 06:26

## 2018-05-27 RX ADMIN — MAGNESIUM OXIDE 400 MG ORAL TABLET 400 MILLIGRAM(S): 241.3 TABLET ORAL at 18:30

## 2018-05-27 NOTE — PROGRESS NOTE ADULT - ASSESSMENT
62F with severe NICM, s/p MDT AICD (EF 11%), HTN, DM, history of thyroid CA, and obesity who was admitted with acute on chronic systolic heart failure and transferred to the CCU for further management of cardiogenic shock with inotrope assisted diuresis, now showing slow improvement.

## 2018-05-27 NOTE — PROGRESS NOTE ADULT - ASSESSMENT
62yFemale with severe NICM s/p MDT AICD, HTN, DM, history of thyroid CA, obesity admitted with acute on chronic systolic heart failure.      Problem/Plan - 1:  ·  Problem: Acute on chronic systolic congestive heart failure with severe Cardiomyopathy S/P AICD  .  Plan: S/P RHC. Diuresing very well . Holding Bumex to avoid over diuresis . HF team recommending evaluation for LVAD/Transplant .       Problem/Plan - 2:  ·  Problem: Cardiogenic Shock .  Plan: S/P RHC. On Dobutamine.     Problem/Plan - 3:  ·  Problem: Essential hypertension.  Plan: BP readings fine. Low salt diet.  Continue BP meds.      Problem/Plan - 4:  ·  Problem: Diabetes mellitus, type 2.  Plan: Sugars in acceptable range .   FS QID  HISS   DM diet.      Problem/Plan - 5:  ·  Problem: Hypocalcemia .  Plan :Replacing. Renal helping.      Problem/Plan -6:  ·  Problem: Abnormal LFT  .  Plan : Secondary to Hepatic congestion from CHF . GI helping.      Problem/Plan -7:  ·  Problem: MAGNUS  .  Plan : Resolved. Renal helping.       Problem/Plan -8:  Problem: Need for prophylactic measure. Plan: VTE with Heparin 5000U sub cut BID.  Fall, Aspirations and safety precautions,

## 2018-05-27 NOTE — CHART NOTE - NSCHARTNOTEFT_GEN_A_CORE
JACKSON calculation done at 12 noon: CO 5.3, CI 2.4, SVR 1100, CVP 9. Bumex was discontinued, maintain net neg 2L today, currently at 3.4L out.  K 2.9 continue aggressive repletion of K. Endocrine consulted re: hypocalcemia, will increase calcium carbonate and rocaltrol to achieve Ca goal of 7-8, patient was on larger doses at home. Endocrine will see in am.

## 2018-05-27 NOTE — PROGRESS NOTE ADULT - PROBLEM SELECTOR PLAN 1
Continues on dobutamine gtt @ 5 mcg and Bumex gtt @ 1 mg/hr. CVP 9-18 with this am JACKSON calculation co 5.3,, CI 2.3, and .

## 2018-05-27 NOTE — PROGRESS NOTE ADULT - SUBJECTIVE AND OBJECTIVE BOX
pt seen and examined, no complaints on exam.     aspirin enteric coated 81 milliGRAM(s) Oral daily  buMETAnide Infusion 1 mG/Hr IV Continuous <Continuous>  calcitriol   Capsule 0.25 MICROGram(s) Oral at bedtime  calcium acetate 1334 milliGRAM(s) Oral three times a day with meals  calcium carbonate 500 mG (Tums) Chewable 1 Tablet(s) Chew two times a day  chlorhexidine 4% Liquid 1 Application(s) Topical <User Schedule>  cholecalciferol 1000 Unit(s) Oral daily  dextrose 40% Gel 15 Gram(s) Oral once PRN  dextrose 5%. 1000 milliLiter(s) IV Continuous <Continuous>  dextrose 50% Injectable 12.5 Gram(s) IV Push once  dextrose 50% Injectable 25 Gram(s) IV Push once  dextrose 50% Injectable 25 Gram(s) IV Push once  DOBUTamine Infusion 5 MICROgram(s)/kG/Min IV Continuous <Continuous>  famotidine    Tablet 20 milliGRAM(s) Oral two times a day  gabapentin 100 milliGRAM(s) Oral every 8 hours  glucagon  Injectable 1 milliGRAM(s) IntraMuscular once PRN  heparin  Injectable 5000 Unit(s) SubCutaneous every 12 hours  hydrALAZINE 37.5 milliGRAM(s) Oral every 8 hours  insulin lispro (HumaLOG) corrective regimen sliding scale   SubCutaneous three times a day before meals  insulin lispro (HumaLOG) corrective regimen sliding scale   SubCutaneous at bedtime  levothyroxine 150 MICROGram(s) Oral daily  magnesium oxide 400 milliGRAM(s) Oral two times a day with meals  sodium chloride 0.9% lock flush 3 milliLiter(s) IV Push every 8 hours                            11.2   6.51  )-----------( 150      ( 27 May 2018 06:23 )             33.8       Hemoglobin: 11.2 g/dL (05-27 @ 06:23)  Hemoglobin: 11.2 g/dL (05-26 @ 12:30)  Hemoglobin: 11.2 g/dL (05-26 @ 06:16)  Hemoglobin: 12.1 g/dL (05-25 @ 07:20)  Hemoglobin: 11.8 g/dL (05-24 @ 07:15)      05-27    131<L>  |  83<L>  |  22  ----------------------------<  183<H>  3.5   |  34<H>  |  1.08    Ca    6.6<L>      27 May 2018 00:17  Phos  3.9     05-27  Mg     2.1     05-27    TPro  6.6  /  Alb  3.2<L>  /  TBili  3.9<H>  /  DBili  x   /  AST  48<H>  /  ALT  48<H>  /  AlkPhos  256<H>  05-26    Creatinine Trend: 1.08<--, 1.07<--, 1.07<--, 1.22<--, 1.23<--, 1.13<--    COAGS:           T(C): 37 (05-27-18 @ 05:39), Max: 37.1 (05-26-18 @ 20:16)  HR: 91 (05-27-18 @ 07:00) (81 - 97)  BP: 114/76 (05-27-18 @ 07:00) (81/62 - 124/83)  RR: 16 (05-27-18 @ 07:00) (11 - 22)  SpO2: 99% (05-27-18 @ 07:00) (97% - 100%)  Wt(kg): --    I&O's Summary    26 May 2018 07:01  -  27 May 2018 07:00  --------------------------------------------------------  IN: 2589.2 mL / OUT: 88735 mL / NET: -9740.8 mL      HEENT:   Normal oral mucosa, PERRL, EOMI	  Lymphatic: No obvious lymphadenopathy , (+) edema  Cardiovascular: Normal S1 S2, No JVD, 1/6 ROSALINDA murmur, Peripheral pulses palpable 2+ bilaterally  Respiratory: Lungs clear to auscultation, normal effort 	  Gastrointestinal:  Soft, Non-tender, + BS	  Skin: No rashes,  No cyanosis, warm to touch  Musculoskeletal: Normal range of motion, normal strength  Psychiatry:  Appropriate Mood & affect     TELEMETRY: Vpaced	         ASSESSMENT/PLAN: 	62 year old Female with past medical history of hypertension, hyperlipidemia, chronic RBBB, obesity, thyroid CA s/p thyroidectomy, hypocalcemia (managed by OP endo Dr murray), nonischemic cardiomyopathy with an ejection fraction of 11% and  normal coronaries on left heart cardiac catheterization 2014 , s/p [employer] Claria MRI CRT-ICD placement in June 2017, admitted last month with acute on chronic systolic HF exacerbation, presenting today with SOB, volume overload, acute on chronic systolic HF exacerbation    --Last interrogation noted 99% BIV pacing with normal function, will review that her BIVICD is optimized  --needs aggressive diuresis-   -- no further EP work up necessary at this time   D/W Dr Hallman pt seen and examined, no complaints on exam.     aspirin enteric coated 81 milliGRAM(s) Oral daily  buMETAnide Infusion 1 mG/Hr IV Continuous <Continuous>  calcitriol   Capsule 0.25 MICROGram(s) Oral at bedtime  calcium acetate 1334 milliGRAM(s) Oral three times a day with meals  calcium carbonate 500 mG (Tums) Chewable 1 Tablet(s) Chew two times a day  chlorhexidine 4% Liquid 1 Application(s) Topical <User Schedule>  cholecalciferol 1000 Unit(s) Oral daily  dextrose 40% Gel 15 Gram(s) Oral once PRN  dextrose 5%. 1000 milliLiter(s) IV Continuous <Continuous>  dextrose 50% Injectable 12.5 Gram(s) IV Push once  dextrose 50% Injectable 25 Gram(s) IV Push once  dextrose 50% Injectable 25 Gram(s) IV Push once  DOBUTamine Infusion 5 MICROgram(s)/kG/Min IV Continuous <Continuous>  famotidine    Tablet 20 milliGRAM(s) Oral two times a day  gabapentin 100 milliGRAM(s) Oral every 8 hours  glucagon  Injectable 1 milliGRAM(s) IntraMuscular once PRN  heparin  Injectable 5000 Unit(s) SubCutaneous every 12 hours  hydrALAZINE 37.5 milliGRAM(s) Oral every 8 hours  insulin lispro (HumaLOG) corrective regimen sliding scale   SubCutaneous three times a day before meals  insulin lispro (HumaLOG) corrective regimen sliding scale   SubCutaneous at bedtime  levothyroxine 150 MICROGram(s) Oral daily  magnesium oxide 400 milliGRAM(s) Oral two times a day with meals  sodium chloride 0.9% lock flush 3 milliLiter(s) IV Push every 8 hours                            11.2   6.51  )-----------( 150      ( 27 May 2018 06:23 )             33.8       Hemoglobin: 11.2 g/dL (05-27 @ 06:23)  Hemoglobin: 11.2 g/dL (05-26 @ 12:30)  Hemoglobin: 11.2 g/dL (05-26 @ 06:16)  Hemoglobin: 12.1 g/dL (05-25 @ 07:20)  Hemoglobin: 11.8 g/dL (05-24 @ 07:15)      05-27    131<L>  |  83<L>  |  22  ----------------------------<  183<H>  3.5   |  34<H>  |  1.08    Ca    6.6<L>      27 May 2018 00:17  Phos  3.9     05-27  Mg     2.1     05-27    TPro  6.6  /  Alb  3.2<L>  /  TBili  3.9<H>  /  DBili  x   /  AST  48<H>  /  ALT  48<H>  /  AlkPhos  256<H>  05-26    Creatinine Trend: 1.08<--, 1.07<--, 1.07<--, 1.22<--, 1.23<--, 1.13<--    COAGS:           T(C): 37 (05-27-18 @ 05:39), Max: 37.1 (05-26-18 @ 20:16)  HR: 91 (05-27-18 @ 07:00) (81 - 97)  BP: 114/76 (05-27-18 @ 07:00) (81/62 - 124/83)  RR: 16 (05-27-18 @ 07:00) (11 - 22)  SpO2: 99% (05-27-18 @ 07:00) (97% - 100%)  Wt(kg): --    I&O's Summary    26 May 2018 07:01  -  27 May 2018 07:00  --------------------------------------------------------  IN: 2589.2 mL / OUT: 81335 mL / NET: -9740.8 mL      HEENT:   Normal oral mucosa, PERRL, EOMI	  Lymphatic: No obvious lymphadenopathy , (+) edema  Cardiovascular: Normal S1 S2, No JVD, 1/6 ROSALINDA murmur, Peripheral pulses palpable 2+ bilaterally  Respiratory: Lungs clear to auscultation, normal effort 	  Gastrointestinal:  Soft, Non-tender, + BS	  Skin: No rashes,  No cyanosis, warm to touch  Musculoskeletal: Normal range of motion, normal strength  Psychiatry:  Appropriate Mood & affect     TELEMETRY: Vpaced	         ASSESSMENT/PLAN: 	62 year old Female with past medical history of hypertension, hyperlipidemia, chronic RBBB, obesity, thyroid CA s/p thyroidectomy, hypocalcemia (managed by OP endo Dr murray), nonischemic cardiomyopathy with an ejection fraction of 11% and  normal coronaries on left heart cardiac catheterization 2014 , s/p [employer] Claria MRI CRT-ICD placement in June 2017, admitted last month with acute on chronic systolic HF exacerbation, presenting today with SOB, volume overload, acute on chronic systolic HF exacerbation    --Last interrogation noted 99% BIV pacing with normal function, based on EKG her BIVICD is optimized  --needs aggressive diuresis-   -- no further EP work up necessary at this time   D/W Dr Hallman

## 2018-05-27 NOTE — PROGRESS NOTE ADULT - SUBJECTIVE AND OBJECTIVE BOX
[facility]  Wale Tovar PA-C  [address] [place] [address]  [phone]      INTERVAL HPI/OVERNIGHT EVENTS:    no new events  lfts improving       MEDICATIONS  (STANDING):  aspirin enteric coated 81 milliGRAM(s) Oral daily  buMETAnide Infusion 1 mG/Hr (10 mL/Hr) IV Continuous <Continuous>  calcitriol   Capsule 0.25 MICROGram(s) Oral at bedtime  calcium acetate 1334 milliGRAM(s) Oral three times a day with meals  calcium carbonate 500 mG (Tums) Chewable 1 Tablet(s) Chew two times a day  chlorhexidine 4% Liquid 1 Application(s) Topical <User Schedule>  cholecalciferol 1000 Unit(s) Oral daily  dextrose 5%. 1000 milliLiter(s) (50 mL/Hr) IV Continuous <Continuous>  dextrose 50% Injectable 12.5 Gram(s) IV Push once  dextrose 50% Injectable 25 Gram(s) IV Push once  dextrose 50% Injectable 25 Gram(s) IV Push once  DOBUTamine Infusion 5 MICROgram(s)/kG/Min (17.055 mL/Hr) IV Continuous <Continuous>  famotidine    Tablet 20 milliGRAM(s) Oral two times a day  heparin  Injectable 5000 Unit(s) SubCutaneous every 12 hours  hydrALAZINE 25 milliGRAM(s) Oral every 8 hours  insulin lispro (HumaLOG) corrective regimen sliding scale   SubCutaneous three times a day before meals  insulin lispro (HumaLOG) corrective regimen sliding scale   SubCutaneous at bedtime  levothyroxine 150 MICROGram(s) Oral daily  magnesium oxide 400 milliGRAM(s) Oral two times a day with meals  sodium chloride 0.9% lock flush 3 milliLiter(s) IV Push every 8 hours    MEDICATIONS  (PRN):  dextrose 40% Gel 15 Gram(s) Oral once PRN Blood Glucose LESS THAN 70 milliGRAM(s)/deciliter  glucagon  Injectable 1 milliGRAM(s) IntraMuscular once PRN Glucose LESS THAN 70 milligrams/deciliter      Allergies    penicillin (Rash)    Intolerances        ROS:   General:  No wt loss, fevers, chills, night sweats, fatigue,   Eyes:  Good vision, no reported pain  ENT:  No sore throat, pain, runny nose, dysphagia  CV:  No pain, palpitations, hypo/hypertension  Resp:  No dyspnea, cough, tachypnea, wheezing  GI:  No pain, No nausea, No vomiting, No diarrhea, No constipation, No weight loss, No fever, No pruritis, No rectal bleeding, No tarry stools, No dysphagia,  :  No pain, bleeding, incontinence, nocturia  Muscle:  No pain, weakness  Neuro:  No weakness, tingling, memory problems  Psych:  No fatigue, insomnia, mood problems, depression  Endocrine:  No polyuria, polydipsia, cold/heat intolerance  Heme:  No petechiae, ecchymosis, easy bruisability  Skin:  No rash, tattoos, scars, edema      PHYSICAL EXAM:   Vital Signs:  Vital Signs Last 24 Hrs  T(C): 36.4 (26 May 2018 08:00), Max: 36.4 (26 May 2018 08:00)  T(F): 97.5 (26 May 2018 08:00), Max: 97.5 (26 May 2018 08:00)  HR: 88 (26 May 2018 11:00) (64 - 91)  BP: 124/83 (26 May 2018 11:00) (89/64 - 131/85)  BP(mean): 91 (26 May 2018 11:00) (70 - 91)  RR: 11 (26 May 2018 11:00) (9 - 22)  SpO2: 100% (26 May 2018 11:00) (99% - 100%)  Daily Height in cm: 170.18 (25 May 2018 15:00)    Daily Weight in k.3 (26 May 2018 06:00)    GENERAL:  Appears stated age, well-groomed, well-nourished, no distress  HEENT:  NC/AT,  conjunctivae clear and pink, no thyromegaly, nodules, adenopathy, no JVD, sclera -anicteric  CHEST:  Full & symmetric excursion, no increased effort, breath sounds clear  HEART:  Regular rhythm, S1, S2, no murmur/rub/S3/S4, no abdominal bruit, no edema  ABDOMEN:  Soft, non-tender, non-distended, normoactive bowel sounds,  no masses ,no hepato-splenomegaly, no signs of chronic liver disease  EXTEREMITIES:  no cyanosis,clubbing or edema  SKIN:  No rash/erythema/ecchymoses/petechiae/wounds/abscess/warm/dry  NEURO:  Alert, oriented, no asterixis, no tremor, no encephalopathy      LABS:                        11.2   7.33  )-----------( 155      ( 26 May 2018 06:16 )             33.3     05-    132<L>  |  84<L>  |  26<H>  ----------------------------<  139<H>  4.0   |  33<H>  |  1.22    Ca    6.7<L>      26 May 2018 06:16  Phos  5.4       Mg     2.0         TPro  6.3  /  Alb  2.9<L>  /  TBili  3.8<H>  /  DBili  x   /  AST  48<H>  /  ALT  48<H>  /  AlkPhos  266<H>        Urinalysis Basic - ( 25 May 2018 15:43 )    Color: PLYEL / Appearance: CLEAR / S.006 / pH: 7.0  Gluc: NEGATIVE / Ketone: NEGATIVE  / Bili: NEGATIVE / Urobili: NORMAL mg/dL   Blood: SMALL / Protein: NEGATIVE mg/dL / Nitrite: NEGATIVE   Leuk Esterase: NEGATIVE / RBC: 0-2 / WBC 0-2   Sq Epi: x / Non Sq Epi: x / Bacteria: x        RADIOLOGY & ADDITIONAL TESTS:

## 2018-05-27 NOTE — PROGRESS NOTE ADULT - SUBJECTIVE AND OBJECTIVE BOX
NEPHROLOGY - NSN    Patient seen and examined.    MEDICATIONS  (STANDING):  aspirin enteric coated 81 milliGRAM(s) Oral daily  calcitriol   Capsule 0.25 MICROGram(s) Oral at bedtime  calcium acetate 1334 milliGRAM(s) Oral three times a day with meals  calcium carbonate 500 mG (Tums) Chewable 1 Tablet(s) Chew four times a day  chlorhexidine 4% Liquid 1 Application(s) Topical <User Schedule>  cholecalciferol 1000 Unit(s) Oral daily  dextrose 5%. 1000 milliLiter(s) (50 mL/Hr) IV Continuous <Continuous>  dextrose 50% Injectable 12.5 Gram(s) IV Push once  dextrose 50% Injectable 25 Gram(s) IV Push once  dextrose 50% Injectable 25 Gram(s) IV Push once  DOBUTamine Infusion 5 MICROgram(s)/kG/Min (17.055 mL/Hr) IV Continuous <Continuous>  famotidine    Tablet 20 milliGRAM(s) Oral two times a day  gabapentin 100 milliGRAM(s) Oral every 8 hours  heparin  Injectable 5000 Unit(s) SubCutaneous every 12 hours  hydrALAZINE 37.5 milliGRAM(s) Oral every 8 hours  insulin lispro (HumaLOG) corrective regimen sliding scale   SubCutaneous three times a day before meals  insulin lispro (HumaLOG) corrective regimen sliding scale   SubCutaneous at bedtime  isosorbide   dinitrate Tablet (ISORDIL) 10 milliGRAM(s) Oral every 8 hours  levothyroxine 150 MICROGram(s) Oral daily  magnesium oxide 400 milliGRAM(s) Oral two times a day with meals  potassium chloride  20 mEq/100 mL IVPB 20 milliEquivalent(s) IV Intermittent every 2 hours  sodium chloride 0.9% lock flush 3 milliLiter(s) IV Push every 8 hours    VITALS:  T(C): , Max: 37.1 (18 @ 20:16)  T(F): , Max: 98.8 (18 @ 20:16)  HR: 92 (18 @ 14:00)  BP: 113/82 (18 @ 14:00)  BP(mean): 89 (18 @ 14:00)  RR: 15 (18 @ 14:00)  SpO2: 99% (18 @ 14:00)  Wt(kg): --  I and O's:     @ 07: @ 07:00  --------------------------------------------------------  IN: 2589.2 mL / OUT: 01047 mL / NET: -9740.8 mL     @ 07: @ 14:30  --------------------------------------------------------  IN: 1049.7 mL / OUT: 4700 mL / NET: -3650.3 mL          REVIEW OF SYSTEMS:  Denies any nausea, vomiting, diarrhea, fever or chills. Denies chest pain, SOB, focal weakness, hematuria or dysuria. Good oral intake and denies fatigue or weakness. All other pertinent systems are reviewed and are negative.     PHYSICAL EXAM:  Constitutional: NAD  Respiratory: CTA B/L  Cardiovascular: S1 and S2  Gastrointestinal: BS+, soft, NT/ND  Extremities: No peripheral edema  Neurological: AAO x 3  : No Gustafson  Access: Not applicable    LABS:                        11.2   6.51  )-----------( 150      ( 27 May 2018 06:23 )             33.8         134<L>  |  85<L>  |  16  ----------------------------<  209<H>  3.3<L>   |  35<H>  |  0.93    Ca    6.1<LL>      27 May 2018 12:20  Phos  3.5       Mg     1.7         TPro  6.7  /  Alb  3.2<L>  /  TBili  3.9<H>  /  DBili  x   /  AST  47<H>  /  ALT  46<H>  /  AlkPhos  254<H>        Urine Studies:  Urinalysis Basic - ( 25 May 2018 15:43 )    Color: PLYEL / Appearance: CLEAR / S.006 / pH: 7.0  Gluc: NEGATIVE / Ketone: NEGATIVE  / Bili: NEGATIVE / Urobili: NORMAL mg/dL   Blood: SMALL / Protein: NEGATIVE mg/dL / Nitrite: NEGATIVE   Leuk Esterase: NEGATIVE / RBC: 0-2 / WBC 0-2   Sq Epi: x / Non Sq Epi: x / Bacteria: x          RADIOLOGY & ADDITIONAL STUDIES: NEPHROLOGY - NSN    Patient seen and examined. Doing well.     MEDICATIONS  (STANDING):  aspirin enteric coated 81 milliGRAM(s) Oral daily  calcitriol   Capsule 0.25 MICROGram(s) Oral at bedtime  calcium acetate 1334 milliGRAM(s) Oral three times a day with meals  calcium carbonate 500 mG (Tums) Chewable 1 Tablet(s) Chew four times a day  chlorhexidine 4% Liquid 1 Application(s) Topical <User Schedule>  cholecalciferol 1000 Unit(s) Oral daily  dextrose 5%. 1000 milliLiter(s) (50 mL/Hr) IV Continuous <Continuous>  dextrose 50% Injectable 12.5 Gram(s) IV Push once  dextrose 50% Injectable 25 Gram(s) IV Push once  dextrose 50% Injectable 25 Gram(s) IV Push once  DOBUTamine Infusion 5 MICROgram(s)/kG/Min (17.055 mL/Hr) IV Continuous <Continuous>  famotidine    Tablet 20 milliGRAM(s) Oral two times a day  gabapentin 100 milliGRAM(s) Oral every 8 hours  heparin  Injectable 5000 Unit(s) SubCutaneous every 12 hours  hydrALAZINE 37.5 milliGRAM(s) Oral every 8 hours  insulin lispro (HumaLOG) corrective regimen sliding scale   SubCutaneous three times a day before meals  insulin lispro (HumaLOG) corrective regimen sliding scale   SubCutaneous at bedtime  isosorbide   dinitrate Tablet (ISORDIL) 10 milliGRAM(s) Oral every 8 hours  levothyroxine 150 MICROGram(s) Oral daily  magnesium oxide 400 milliGRAM(s) Oral two times a day with meals  potassium chloride  20 mEq/100 mL IVPB 20 milliEquivalent(s) IV Intermittent every 2 hours  sodium chloride 0.9% lock flush 3 milliLiter(s) IV Push every 8 hours    VITALS:  T(C): , Max: 37.1 (18 @ 20:16)  T(F): , Max: 98.8 (18 @ 20:16)  HR: 92 (18 @ 14:00)  BP: 113/82 (18 @ 14:00)  BP(mean): 89 (18 @ 14:00)  RR: 15 (18 @ 14:00)  SpO2: 99% (18 @ 14:00)  Wt(kg): --  I and O's:     @ 07: @ 07:00  --------------------------------------------------------  IN: 2589.2 mL / OUT: 09449 mL / NET: -9740.8 mL     @ 07: @ 14:30  --------------------------------------------------------  IN: 1049.7 mL / OUT: 4700 mL / NET: -3650.3 mL        PHYSICAL EXAM:  Constitutional: NAD  HEENT: MICHELLE Phillips  Respiratory: diminished B/L  Cardiovascular: S1 and S2  Gastrointestinal: BS+, soft, NT, + distended/edema  Extremities: + edema  Neurological: AAO x 3  : + Gustafson  Access: Not applicable    LABS:                        11.2   6.51  )-----------( 150      ( 27 May 2018 06:23 )             33.8         134<L>  |  85<L>  |  16  ----------------------------<  209<H>  3.3<L>   |  35<H>  |  0.93    Ca    6.1<LL>      27 May 2018 12:20  Phos  3.5       Mg     1.7         TPro  6.7  /  Alb  3.2<L>  /  TBili  3.9<H>  /  DBili  x   /  AST  47<H>  /  ALT  46<H>  /  AlkPhos  254<H>        Urine Studies:  Urinalysis Basic - ( 25 May 2018 15:43 )    Color: PLYEL / Appearance: CLEAR / S.006 / pH: 7.0  Gluc: NEGATIVE / Ketone: NEGATIVE  / Bili: NEGATIVE / Urobili: NORMAL mg/dL   Blood: SMALL / Protein: NEGATIVE mg/dL / Nitrite: NEGATIVE   Leuk Esterase: NEGATIVE / RBC: 0-2 / WBC 0-2   Sq Epi: x / Non Sq Epi: x / Bacteria: x          RADIOLOGY & ADDITIONAL STUDIES:

## 2018-05-27 NOTE — PROGRESS NOTE ADULT - SUBJECTIVE AND OBJECTIVE BOX
INTERVAL HPI/OVERNIGHT EVENTS: Seen and examined with daughter in room . I feel so better.   Vital Signs Last 24 Hrs  T(C): 36.5 (27 May 2018 08:00), Max: 37.1 (26 May 2018 20:16)  T(F): 97.7 (27 May 2018 08:00), Max: 98.8 (26 May 2018 20:16)  HR: 88 (27 May 2018 11:00) (86 - 97)  BP: 110/68 (27 May 2018 11:00) (81/62 - 122/73)  BP(mean): 73 (27 May 2018 11:00) (66 - 93)  RR: 16 (27 May 2018 11:00) (12 - 22)  SpO2: 100% (27 May 2018 11:00) (97% - 100%)  I&O's Summary    26 May 2018 07:  -  27 May 2018 07:00  --------------------------------------------------------  IN: 2589.2 mL / OUT: 72169 mL / NET: -9740.8 mL    27 May 2018 07:01  -  27 May 2018 12:11  --------------------------------------------------------  IN: 661.3 mL / OUT: 2850 mL / NET: -2188.7 mL      MEDICATIONS  (STANDING):  aspirin enteric coated 81 milliGRAM(s) Oral daily  calcitriol   Capsule 0.25 MICROGram(s) Oral at bedtime  calcium acetate 1334 milliGRAM(s) Oral three times a day with meals  calcium carbonate 500 mG (Tums) Chewable 1 Tablet(s) Chew two times a day  chlorhexidine 4% Liquid 1 Application(s) Topical <User Schedule>  cholecalciferol 1000 Unit(s) Oral daily  dextrose 5%. 1000 milliLiter(s) (50 mL/Hr) IV Continuous <Continuous>  dextrose 50% Injectable 12.5 Gram(s) IV Push once  dextrose 50% Injectable 25 Gram(s) IV Push once  dextrose 50% Injectable 25 Gram(s) IV Push once  DOBUTamine Infusion 5 MICROgram(s)/kG/Min (17.055 mL/Hr) IV Continuous <Continuous>  famotidine    Tablet 20 milliGRAM(s) Oral two times a day  gabapentin 100 milliGRAM(s) Oral every 8 hours  heparin  Injectable 5000 Unit(s) SubCutaneous every 12 hours  hydrALAZINE 37.5 milliGRAM(s) Oral every 8 hours  insulin lispro (HumaLOG) corrective regimen sliding scale   SubCutaneous three times a day before meals  insulin lispro (HumaLOG) corrective regimen sliding scale   SubCutaneous at bedtime  isosorbide   dinitrate Tablet (ISORDIL) 10 milliGRAM(s) Oral every 8 hours  levothyroxine 150 MICROGram(s) Oral daily  magnesium oxide 400 milliGRAM(s) Oral two times a day with meals  sodium chloride 0.9% lock flush 3 milliLiter(s) IV Push every 8 hours    MEDICATIONS  (PRN):  dextrose 40% Gel 15 Gram(s) Oral once PRN Blood Glucose LESS THAN 70 milliGRAM(s)/deciliter  glucagon  Injectable 1 milliGRAM(s) IntraMuscular once PRN Glucose LESS THAN 70 milligrams/deciliter    LABS:                        11.2   6.51  )-----------( 150      ( 27 May 2018 06:23 )             33.8         133<L>  |  84<L>  |  19  ----------------------------<  128<H>  3.7   |  36<H>  |  1.03    Ca    6.2<LL>      27 May 2018 06:23  Phos  4.0       Mg     1.9         TPro  6.7  /  Alb  3.0<L>  /  TBili  3.8<H>  /  DBili  x   /  AST  47<H>  /  ALT  45<H>  /  AlkPhos  263<H>        Urinalysis Basic - ( 25 May 2018 15:43 )    Color: PLYEL / Appearance: CLEAR / S.006 / pH: 7.0  Gluc: NEGATIVE / Ketone: NEGATIVE  / Bili: NEGATIVE / Urobili: NORMAL mg/dL   Blood: SMALL / Protein: NEGATIVE mg/dL / Nitrite: NEGATIVE   Leuk Esterase: NEGATIVE / RBC: 0-2 / WBC 0-2   Sq Epi: x / Non Sq Epi: x / Bacteria: x      CAPILLARY BLOOD GLUCOSE      POCT Blood Glucose.: 116 mg/dL (27 May 2018 08:23)  POCT Blood Glucose.: 139 mg/dL (26 May 2018 22:23)  POCT Blood Glucose.: 110 mg/dL (26 May 2018 18:00)  POCT Blood Glucose.: 188 mg/dL (26 May 2018 12:50)      ABG - ( 26 May 2018 23:56 )  pH, Arterial: 7.53  pH, Blood: x     /  pCO2: 43    /  pO2: 66    / HCO3: 36    / Base Excess: 12.4  /  SaO2: 95.2              Urinalysis Basic - ( 25 May 2018 15:43 )    Color: PLYEL / Appearance: CLEAR / S.006 / pH: 7.0  Gluc: NEGATIVE / Ketone: NEGATIVE  / Bili: NEGATIVE / Urobili: NORMAL mg/dL   Blood: SMALL / Protein: NEGATIVE mg/dL / Nitrite: NEGATIVE   Leuk Esterase: NEGATIVE / RBC: 0-2 / WBC 0-2   Sq Epi: x / Non Sq Epi: x / Bacteria: x      REVIEW OF SYSTEMS:  CONSTITUTIONAL: No fever, weight loss, or fatigue  EYES: No eye pain, visual disturbances, or discharge  ENMT:  No difficulty hearing, tinnitus, vertigo; No sinus or throat pain  NECK: No pain or stiffness  BREASTS: No pain, masses, or nipple discharge  RESPIRATORY: No cough, wheezing, chills or hemoptysis; No shortness of breath  CARDIOVASCULAR: No chest pain, palpitations, dizziness, or leg swelling  GASTROINTESTINAL: No abdominal or epigastric pain. No nausea, vomiting, or hematemesis; No diarrhea or constipation. No melena or hematochezia.  GENITOURINARY: No dysuria, frequency, hematuria, or incontinence  NEUROLOGICAL: No headaches, memory loss, loss of strength, numbness, or tremors  SKIN: No itching, burning, rashes, or lesions     Consultant(s) Notes Reviewed:  [x ] YES  [ ] NO    PHYSICAL EXAM:  GENERAL: NAD, well-groomed, well-developed, not in any distress ,  HEAD:  Atraumatic, Normocephalic  EYES: EOMI, PERRLA, conjunctiva and sclera clear  ENMT: No tonsillar erythema, exudates, or enlargement; Moist mucous membranes, Good dentition, No lesions  NECK: Supple, No JVD, Normal thyroid  NERVOUS SYSTEM:  Alert & Oriented X3, No focal deficit   CHEST/LUNG: Good air entry bilateral with no  rales, rhonchi, wheezing, or rubs  HEART: Regular rate and rhythm; No murmurs, rubs, or gallops  ABDOMEN: Soft, Nontender, Nondistended; Bowel sounds present  EXTREMITIES:  2+ Peripheral Pulses, No clubbing, cyanosis, less edema  SKIN: No rashes or lesions    Care Discussed with Consultants/Other Providers [ x] YES  [ ] NO

## 2018-05-27 NOTE — PROGRESS NOTE ADULT - SUBJECTIVE AND OBJECTIVE BOX
Subjective: No chest pain; sob improving LE edema improving        MEDICATIONS  (STANDING):  aspirin enteric coated 81 milliGRAM(s) Oral daily  calcitriol   Capsule 0.25 MICROGram(s) Oral at bedtime  calcium acetate 1334 milliGRAM(s) Oral three times a day with meals  calcium carbonate 500 mG (Tums) Chewable 1 Tablet(s) Chew four times a day  chlorhexidine 4% Liquid 1 Application(s) Topical <User Schedule>  cholecalciferol 1000 Unit(s) Oral daily  dextrose 5%. 1000 milliLiter(s) (50 mL/Hr) IV Continuous <Continuous>  dextrose 50% Injectable 12.5 Gram(s) IV Push once  dextrose 50% Injectable 25 Gram(s) IV Push once  dextrose 50% Injectable 25 Gram(s) IV Push once  DOBUTamine Infusion 5 MICROgram(s)/kG/Min (17.055 mL/Hr) IV Continuous <Continuous>  famotidine    Tablet 20 milliGRAM(s) Oral two times a day  gabapentin 100 milliGRAM(s) Oral every 8 hours  heparin  Injectable 5000 Unit(s) SubCutaneous every 12 hours  hydrALAZINE 37.5 milliGRAM(s) Oral every 8 hours  insulin lispro (HumaLOG) corrective regimen sliding scale   SubCutaneous three times a day before meals  insulin lispro (HumaLOG) corrective regimen sliding scale   SubCutaneous at bedtime  isosorbide   dinitrate Tablet (ISORDIL) 10 milliGRAM(s) Oral every 8 hours  levothyroxine 150 MICROGram(s) Oral daily  magnesium oxide 400 milliGRAM(s) Oral two times a day with meals  potassium chloride  20 mEq/100 mL IVPB 20 milliEquivalent(s) IV Intermittent every 2 hours  sodium chloride 0.9% lock flush 3 milliLiter(s) IV Push every 8 hours    MEDICATIONS  (PRN):  dextrose 40% Gel 15 Gram(s) Oral once PRN Blood Glucose LESS THAN 70 milliGRAM(s)/deciliter  glucagon  Injectable 1 milliGRAM(s) IntraMuscular once PRN Glucose LESS THAN 70 milligrams/deciliter      LABS:                        11.2   6.51  )-----------( 150      ( 27 May 2018 06:23 )             33.8     Hemoglobin: 11.2 g/dL ( @ 06:23)  Hemoglobin: 11.2 g/dL ( @ 12:30)  Hemoglobin: 11.2 g/dL ( @ 06:16)  Hemoglobin: 12.1 g/dL ( @ 07:20)  Hemoglobin: 11.8 g/dL ( @ 07:15)        134<L>  |  85<L>  |  16  ----------------------------<  209<H>  3.3<L>   |  35<H>  |  0.93    Ca    6.1<LL>      27 May 2018 12:20  Phos  3.5       Mg     1.7         TPro  6.7  /  Alb  3.2<L>  /  TBili  3.9<H>  /  DBili  x   /  AST  47<H>  /  ALT  46<H>  /  AlkPhos  254<H>      Creatinine Trend: 0.93<--, 1.03<--, 1.08<--, 1.07<--, 1.07<--, 1.22<--           PHYSICAL EXAM  Vital Signs Last 24 Hrs  T(C): 36.6 (27 May 2018 12:00), Max: 37.1 (26 May 2018 20:16)  T(F): 97.9 (27 May 2018 12:00), Max: 98.8 (26 May 2018 20:16)  HR: 92 (27 May 2018 14:00) (86 - 97)  BP: 113/82 (27 May 2018 14:00) (81/62 - 122/73)  BP(mean): 89 (27 May 2018 14:00) (66 - 93)  RR: 15 (27 May 2018 14:00) (12 - 22)  SpO2: 99% (27 May 2018 14:00) (97% - 100%)      Heart: normal S1, S2, RRR, no m/r/g  Lungs: cta bilaterally  Abd: soft nT, nD  Ext: 2+ pitting edema bilaterally; warm to touch     TELEMETRY: 	    ECG:  	  RADIOLOGY:   < from: Xray Chest 1 View- PORTABLE-Urgent (18 @ 10:32) >  INTERPRETATION:     May 26 at 7:15 AM:  Van Buren-Radha catheter seen with its tip in the right interlobar artery   slightly advanced since the last study.    Lungs are clear, heart is enlarged and no effusion or pneumothorax.    9:39 AM:  Tip of the Van Buren-Radha catheter has been pulled back so it is now in the   right main pulmonary artery segment. Left-sided AICD unchanged, stable   cardiomegaly and clear lungs.      COMPARISON:  May 25      IMPRESSION:  Follow-up studies post Van Buren-Radha catheter placement and     < end of copied text >    DIAGNOSTIC TESTING:  [ ] Echocardiogram: < from: TTE with Doppler (w/Cont) (18 @ 20:04) >  CONCLUSIONS:  1. Mitral annular calcification, otherwise normal mitral  valve. Moderate mitral regurgitation.  2. Moderately dilated left atrium.  LA volume index = 47  cc/m2.  3. Mild left ventricular enlargement.  4. Severe global left ventricular systolic dysfunction.  Endocardial visualization enhanced with intravenous  injection of echo contrast (Definity).  No LV thrombus  seen.  5. Right ventricular enlargement with decreased right  ventricular systolic function.  A device wire is noted in  the right heart.  6. Estimated right ventricular systolic pressure equals 42  mm Hg, assuming right atrial pressure equals 10 mm Hg,  consistent with mild pulmonary hypertension.  7.Normal tricuspid valve.  Severe tricuspid regurgitation.  *** Compared with echocardiogram of 2017, left  ventricular systolic function has deteriorated.    < end of copied text >    [ ]  Catheterization:  < from: Cardiac Cath Lab (14 @ 16:49) >  CORONARY VESSELS: The coronary circulation is co-dominant.  LM:   -- LM: Normal.  LAD:   --  LAD: Normal.  CX:   --  Circumflex: Normal.  RCA:   --  RCA: Normal.  COMPLICATIONS: There were no complications.    < end of copied text >    [ ] Stress Test:    OTHER: 	      ASSESSMENT/PLAN: 	62y Female with severe NICM s/p AICD, HTN, DM admitted with acute on chronic systolic heart failure.   -Pt still volume overloaded but warm to touch   -pt chest pressure likely secondary to heart failure - given negative enzymes and prior cardiac cath in  showing normal coronary arteries, no urgent coronary angiogram needed at this time  -management of heart failure per cardiology    c/w  assisted diuresis off bumex gtt   -follow up renal  f/u endo  RE; hypocalcemia not responding to supplementation     supplement electrolytes as required   -cont care ccu

## 2018-05-27 NOTE — PROGRESS NOTE ADULT - SUBJECTIVE AND OBJECTIVE BOX
Patient denies CP, Breathing much better.  Clinically improving  diuresing well    aspirin enteric coated 81 milliGRAM(s) Oral daily  calcitriol   Capsule 0.25 MICROGram(s) Oral at bedtime  calcium acetate 1334 milliGRAM(s) Oral three times a day with meals  calcium carbonate 500 mG (Tums) Chewable 1 Tablet(s) Chew four times a day  chlorhexidine 4% Liquid 1 Application(s) Topical <User Schedule>  cholecalciferol 1000 Unit(s) Oral daily  dextrose 40% Gel 15 Gram(s) Oral once PRN  dextrose 5%. 1000 milliLiter(s) IV Continuous <Continuous>  dextrose 50% Injectable 12.5 Gram(s) IV Push once  dextrose 50% Injectable 25 Gram(s) IV Push once  dextrose 50% Injectable 25 Gram(s) IV Push once  DOBUTamine Infusion 5 MICROgram(s)/kG/Min IV Continuous <Continuous>  famotidine    Tablet 20 milliGRAM(s) Oral two times a day  gabapentin 100 milliGRAM(s) Oral every 8 hours  glucagon  Injectable 1 milliGRAM(s) IntraMuscular once PRN  heparin  Injectable 5000 Unit(s) SubCutaneous every 12 hours  hydrALAZINE 37.5 milliGRAM(s) Oral every 8 hours  insulin lispro (HumaLOG) corrective regimen sliding scale   SubCutaneous three times a day before meals  insulin lispro (HumaLOG) corrective regimen sliding scale   SubCutaneous at bedtime  isosorbide   dinitrate Tablet (ISORDIL) 10 milliGRAM(s) Oral every 8 hours  levothyroxine 150 MICROGram(s) Oral daily  magnesium oxide 400 milliGRAM(s) Oral two times a day with meals  potassium chloride  20 mEq/100 mL IVPB 20 milliEquivalent(s) IV Intermittent every 2 hours  sodium chloride 0.9% lock flush 3 milliLiter(s) IV Push every 8 hours                            11.2   6.51  )-----------( 150      ( 27 May 2018 06:23 )             33.8       Hemoglobin: 11.2 g/dL ( @ 06:23)  Hemoglobin: 11.2 g/dL ( @ 12:30)  Hemoglobin: 11.2 g/dL ( @ 06:16)  Hemoglobin: 12.1 g/dL ( @ 07:20)  Hemoglobin: 11.8 g/dL ( @ 07:15)          134<L>  |  85<L>  |  16  ----------------------------<  209<H>  3.3<L>   |  35<H>  |  0.93    Ca    6.1<LL>      27 May 2018 12:20  Phos  3.5       Mg     1.7         TPro  6.7  /  Alb  3.2<L>  /  TBili  3.9<H>  /  DBili  x   /  AST  47<H>  /  ALT  46<H>  /  AlkPhos  254<H>      Creatinine Trend: 0.93<--, 1.03<--, 1.08<--, 1.07<--, 1.07<--, 1.22<--    COAGS:           T(C): 36.6 (18 @ 12:00), Max: 37.1 (18 @ 20:16)  HR: 92 (18 @ 14:00) (86 - 97)  BP: 113/82 (18 @ 14:00) (102/72 - 122/73)  RR: 15 (18 @ 14:00) (12 - 22)  SpO2: 99% (18 @ 14:00) (97% - 100%)  Wt(kg): --    I&O's Summary    26 May 2018 07:  -  27 May 2018 07:00  --------------------------------------------------------  IN: 2589.2 mL / OUT: 95732 mL / NET: -9740.8 mL    27 May 2018 07:  -  27 May 2018 15:12  --------------------------------------------------------  IN: 1049.7 mL / OUT: 4700 mL / NET: -3650.3 mL      CVP- 9  CI 2.4  CO 5.3    HEENT:   Normal oral mucosa, PERRL, EOMI	  Lymphatic: No obvious lymphadenopathy , (+) edema decreaseing  Cardiovascular: Normal S1 S2, No JVD, 1/6 ROSALINDA murmur, Peripheral pulses palpable 2+ bilaterally  Respiratory: Lungs clear to auscultation, normal effort 	  Gastrointestinal:  Soft, Non-tender, + BS	  Skin: No rashes,  No cyanosis, warm to touch  Musculoskeletal: Normal range of motion, normal strength  Psychiatry:  Appropriate Mood & affect     TELEMETRY: Vpaced	         ASSESSMENT/PLAN: 	62y Female  with severe NICM s/p MDT AICD, HTN, DM, history of thyroid CA, obesity   Admitted with acute on chronic systolic heart failure.     -  cardiac index with [place] much better  - hold bumex gtt  - cont   - Strict I+O's   - Repelet K+  - hypocalcemia, Endo eval called      Augusto Grimes MD, FACC

## 2018-05-27 NOTE — PROGRESS NOTE ADULT - SUBJECTIVE AND OBJECTIVE BOX
Subjective/Objective: Patient is resting comfortably in bed, denies SOB. c/o some B/L LE aches but improved overnight with addition of gabapentin.     MEDICATIONS  (STANDING):  aspirin enteric coated 81 milliGRAM(s) Oral daily  buMETAnide Infusion 1 mG/Hr (10 mL/Hr) IV Continuous <Continuous>  calcitriol   Capsule 0.25 MICROGram(s) Oral at bedtime  calcium acetate 1334 milliGRAM(s) Oral three times a day with meals  calcium carbonate 500 mG (Tums) Chewable 1 Tablet(s) Chew two times a day  chlorhexidine 4% Liquid 1 Application(s) Topical <User Schedule>  cholecalciferol 1000 Unit(s) Oral daily  dextrose 5%. 1000 milliLiter(s) (50 mL/Hr) IV Continuous <Continuous>  dextrose 50% Injectable 12.5 Gram(s) IV Push once  dextrose 50% Injectable 25 Gram(s) IV Push once  dextrose 50% Injectable 25 Gram(s) IV Push once  DOBUTamine Infusion 5 MICROgram(s)/kG/Min (17.055 mL/Hr) IV Continuous <Continuous>  famotidine    Tablet 20 milliGRAM(s) Oral two times a day  gabapentin 100 milliGRAM(s) Oral every 8 hours  heparin  Injectable 5000 Unit(s) SubCutaneous every 12 hours  hydrALAZINE 37.5 milliGRAM(s) Oral every 8 hours  insulin lispro (HumaLOG) corrective regimen sliding scale   SubCutaneous three times a day before meals  insulin lispro (HumaLOG) corrective regimen sliding scale   SubCutaneous at bedtime  levothyroxine 150 MICROGram(s) Oral daily  magnesium oxide 400 milliGRAM(s) Oral two times a day with meals  sodium chloride 0.9% lock flush 3 milliLiter(s) IV Push every 8 hours    MEDICATIONS  (PRN):  dextrose 40% Gel 15 Gram(s) Oral once PRN Blood Glucose LESS THAN 70 milliGRAM(s)/deciliter  glucagon  Injectable 1 milliGRAM(s) IntraMuscular once PRN Glucose LESS THAN 70 milligrams/deciliter          Vital Signs Last 24 Hrs  T(C): 37 (27 May 2018 05:39), Max: 37.1 (26 May 2018 20:16)  T(F): 98.6 (27 May 2018 05:39), Max: 98.8 (26 May 2018 20:16)  HR: 91 (27 May 2018 07:00) (81 - 97)  BP: 114/76 (27 May 2018 07:00) (81/62 - 124/83)  BP(mean): 84 (27 May 2018 07:00) (66 - 91)  RR: 16 (27 May 2018 07:00) (11 - 22)  SpO2: 99% (27 May 2018 07:00) (97% - 100%)  I&O's Detail    26 May 2018 07:01  -  27 May 2018 07:00  --------------------------------------------------------  IN:    bumetanide Infusion: 240 mL    DOBUTamine Infusion: 409.2 mL    IV PiggyBack: 850 mL    Oral Fluid: 1090 mL  Total IN: 2589.2 mL    OUT:    Indwelling Catheter - Urethral: 66825 mL  Total OUT: 92581 mL    Total NET: -9740.8 mL      PHYSICAL EXAM  GEN: NAD, skin W & D  RESP: CTA ant  CV: nl S1S2, no M/R/C, no S3 today  GI: soft, NT/ND, BS +  EXT: B/L LE edema is improving however remains with edema to thighs and lower back  NEURO: A & O X 3    EKG/ TELEM: paced rhyhtm occ [facility]    LABS:                          11.2   6.51  )-----------( 150      ( 27 May 2018 06:23 )             33.8       27 May 2018 06:23    133<L>  |  84<L>  |  19     ----------------------------<  128<H>  3.7     |  36<H>  |  1.03     27 May 2018 00:17    131<L>  |  83<L>  |  22     ----------------------------<  183<H>  3.5     |  34<H>  |  1.08     Ca    6.6<L>      27 May 2018 00:17  Ca    6.8<L>      26 May 2018 18:19  Phos  4.0       27 May 2018 06:23  Phos  3.9       27 May 2018 00:17  Mg     1.9       27 May 2018 06:23  Mg     2.1       27 May 2018 00:17    TPro  6.7    /  Alb  3.0<L>  /  TBili  3.8<H>  /  DBili  x      /  AST  47<H>  /  ALT  45<H>  /  AlkPhos  263<H>  27 May 2018 06:23  TPro  6.6    /  Alb  3.2<L>  /  TBili  3.9<H>  /  DBili  x      /  AST  48<H>  /  ALT  48<H>  /  AlkPhos  256<H>  26 May 2018 18:19

## 2018-05-27 NOTE — PROGRESS NOTE ADULT - ASSESSMENT
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  She is grossly volume overloaded.  The patient has hypervolemic hyponatremia likely secondary to heart failure.  She also has hypocalcemia likely secondary to the fact that she likely has no parathyroid gland.  Acute decompensated heart failure    Endocrine. continue p.o. calcium supplementation and rocaltrol. calcium slowly improving - endo consult called  Renal.   Diuresis per CACT/HF team. Bumex d/c. On . Renal function intact. Maintain fluid restriction, daily weights, I&O's  Gastrointestinal.  likely  congestive hepatic pathology.  Hypervolemic hyponatremia - improving  Hyperphosphatemia - due to diet? improving, maintain low phos diet  Appreciate current CCU care	    Nannette Rice NP  [facility]  [phone] The patient is a deidre 62yearold female with past medical history of hypertension, diabetes, thyroid cancer, with presumed removal of parathyroid gland, congestive nonischemic cardiomyopathy, congestive heart failure, and pulmonary hypertension who presents with shortness of breath.  She is grossly volume overloaded.  The patient has hypervolemic hyponatremia likely secondary to heart failure.  She also has hypocalcemia likely secondary to the fact that she likely has no parathyroid gland.  Acute decompensated heart failure  Recommended for LVAD/heart transplant per CACT/HF	    Endocrine. continue p.o. calcium supplementation and rocaltrol. calcium slowly improving - endo consult called  Renal.   Diuresis per CACT/HF team. Bumex d/c. On . Maintain K >3.5   Renal function intact. Maintain fluid restriction, daily weights, I&O's  Gastrointestinal.  likely  congestive hepatic pathology.  Hypervolemic hyponatremia - improving  Hyperphosphatemia - due to diet? improving, can d/c low phos diet  Appreciate current CCU care	    Nannette Rice NP  [facility]  [phone] The patient is a deidre 62yearold female with past medical history of hypertension, diabetes, thyroid cancer, with presumed removal of parathyroid gland, congestive nonischemic cardiomyopathy, congestive heart failure, and pulmonary hypertension who presents with shortness of breath.  She is grossly volume overloaded.  The patient has hypervolemic hyponatremia likely secondary to heart failure.  She also has hypocalcemia likely secondary to the fact that she likely has no parathyroid gland.  Acute decompensated heart failure  Recommended for LVAD/heart transplant per CACT/HF	    Endocrine. continue p.o. calcium supplementation and rocaltrol. calcium slowly improving - endo consult called  Renal.   Diuresis per CACT/HF team. Bumex d/c. On . Maintain K >3.5   Renal function intact. Maintain fluid restriction, daily weights, I&O's  goal negative 2 L 	  Gastrointestinal.  likely  congestive hepatic pathology.  Hypervolemic hyponatremia - improving  Hyperphosphatemia - due to diet? improving, can d/c low phos diet  Appreciate current CCU care	    Nannette Rice NP  [facility]  [phone]

## 2018-05-27 NOTE — PROGRESS NOTE ADULT - PROBLEM SELECTOR PLAN 2
Continues to diurese well with 9.7L output over 24 hrs. Wt. down to 99.5 from 107.3 yesterday. Continue bumex and dobutamine as noted. Hydralazine titrated up to 37.5 mg TID will consider continuing to increase as BP tolerates. Repleting K and Mg as needed. Check BMPs at least Q6hr. Per HF recs patient will need eventual evaluation for LVAD/transplant. Continues to diurese well with 9.7L output over 24 hrs. Wt. down to 101 kg from 107.3 yesterday. Continue bumex and dobutamine as noted. Hydralazine titrated up to 37.5 mg TID will consider continuing to increase as BP tolerates. Repleting K and Mg as needed. Check BMPs at least Q6hr. Per HF recs patient will need eventual evaluation for LVAD/transplant.

## 2018-05-28 LAB
ALBUMIN SERPL ELPH-MCNC: 2.9 G/DL — LOW (ref 3.3–5)
ALP SERPL-CCNC: 231 U/L — HIGH (ref 40–120)
ALT FLD-CCNC: 37 U/L — HIGH (ref 4–33)
AST SERPL-CCNC: 42 U/L — HIGH (ref 4–32)
BASE EXCESS BLDA CALC-SCNC: 12.3 MMOL/L — SIGNIFICANT CHANGE UP
BASE EXCESS BLDV CALC-SCNC: 14 MMOL/L — SIGNIFICANT CHANGE UP
BILIRUB SERPL-MCNC: 3.5 MG/DL — HIGH (ref 0.2–1.2)
BLOOD GAS VENOUS - CREATININE: 1.03 MG/DL — SIGNIFICANT CHANGE UP (ref 0.5–1.3)
BUN SERPL-MCNC: 14 MG/DL — SIGNIFICANT CHANGE UP (ref 7–23)
BUN SERPL-MCNC: 16 MG/DL — SIGNIFICANT CHANGE UP (ref 7–23)
BUN SERPL-MCNC: 17 MG/DL — SIGNIFICANT CHANGE UP (ref 7–23)
CA-I BLD-SCNC: 0.73 MMOL/L — CRITICAL LOW (ref 1.03–1.23)
CALCIUM SERPL-MCNC: 6 MG/DL — CRITICAL LOW (ref 8.4–10.5)
CALCIUM SERPL-MCNC: 6.6 MG/DL — LOW (ref 8.4–10.5)
CALCIUM SERPL-MCNC: 7.2 MG/DL — LOW (ref 8.4–10.5)
CHLORIDE BLDA-SCNC: 88 MMOL/L — LOW (ref 96–108)
CHLORIDE BLDV-SCNC: 87 MMOL/L — LOW (ref 96–108)
CHLORIDE SERPL-SCNC: 85 MMOL/L — LOW (ref 98–107)
CHLORIDE SERPL-SCNC: 86 MMOL/L — LOW (ref 98–107)
CHLORIDE SERPL-SCNC: 87 MMOL/L — LOW (ref 98–107)
CO2 SERPL-SCNC: 32 MMOL/L — HIGH (ref 22–31)
CO2 SERPL-SCNC: 32 MMOL/L — HIGH (ref 22–31)
CO2 SERPL-SCNC: 34 MMOL/L — HIGH (ref 22–31)
CREAT SERPL-MCNC: 0.97 MG/DL — SIGNIFICANT CHANGE UP (ref 0.5–1.3)
CREAT SERPL-MCNC: 0.99 MG/DL — SIGNIFICANT CHANGE UP (ref 0.5–1.3)
CREAT SERPL-MCNC: 1.11 MG/DL — SIGNIFICANT CHANGE UP (ref 0.5–1.3)
GAS PNL BLDV: 129 MMOL/L — LOW (ref 136–146)
GLUCOSE BLDA-MCNC: 117 MG/DL — HIGH (ref 70–99)
GLUCOSE BLDV-MCNC: 114 — HIGH (ref 70–99)
GLUCOSE SERPL-MCNC: 114 MG/DL — HIGH (ref 70–99)
GLUCOSE SERPL-MCNC: 134 MG/DL — HIGH (ref 70–99)
GLUCOSE SERPL-MCNC: 172 MG/DL — HIGH (ref 70–99)
HCO3 BLDA-SCNC: 36 MMOL/L — HIGH (ref 22–26)
HCO3 BLDV-SCNC: 36 MMOL/L — HIGH (ref 20–27)
HCT VFR BLD CALC: 32.2 % — LOW (ref 34.5–45)
HCT VFR BLDA CALC: 34.8 % — SIGNIFICANT CHANGE UP (ref 34.5–46.5)
HCT VFR BLDV CALC: 34.6 % — SIGNIFICANT CHANGE UP (ref 34.5–45)
HGB BLD-MCNC: 10.8 G/DL — LOW (ref 11.5–15.5)
HGB BLDA-MCNC: 11.3 G/DL — LOW (ref 11.5–15.5)
HGB BLDV-MCNC: 11.2 G/DL — LOW (ref 11.5–15.5)
LACTATE BLDA-SCNC: 1.2 MMOL/L — SIGNIFICANT CHANGE UP (ref 0.5–2)
LACTATE BLDV-MCNC: 1.1 MMOL/L — SIGNIFICANT CHANGE UP (ref 0.5–2)
MAGNESIUM SERPL-MCNC: 1.9 MG/DL — SIGNIFICANT CHANGE UP (ref 1.6–2.6)
MAGNESIUM SERPL-MCNC: 2 MG/DL — SIGNIFICANT CHANGE UP (ref 1.6–2.6)
MAGNESIUM SERPL-MCNC: 2 MG/DL — SIGNIFICANT CHANGE UP (ref 1.6–2.6)
MCHC RBC-ENTMCNC: 23.9 PG — LOW (ref 27–34)
MCHC RBC-ENTMCNC: 33.5 % — SIGNIFICANT CHANGE UP (ref 32–36)
MCV RBC AUTO: 71.2 FL — LOW (ref 80–100)
NRBC # FLD: 0 — SIGNIFICANT CHANGE UP
PCO2 BLDA: 43 MMHG — SIGNIFICANT CHANGE UP (ref 32–48)
PCO2 BLDV: 49 MMHG — SIGNIFICANT CHANGE UP (ref 41–51)
PH BLDA: 7.53 PH — HIGH (ref 7.35–7.45)
PH BLDV: 7.51 PH — HIGH (ref 7.32–7.43)
PHOSPHATE SERPL-MCNC: 2.8 MG/DL — SIGNIFICANT CHANGE UP (ref 2.5–4.5)
PHOSPHATE SERPL-MCNC: 2.9 MG/DL — SIGNIFICANT CHANGE UP (ref 2.5–4.5)
PHOSPHATE SERPL-MCNC: 3 MG/DL — SIGNIFICANT CHANGE UP (ref 2.5–4.5)
PLATELET # BLD AUTO: 125 K/UL — LOW (ref 150–400)
PMV BLD: SIGNIFICANT CHANGE UP FL (ref 7–13)
PO2 BLDA: 115 MMHG — HIGH (ref 83–108)
PO2 BLDV: 32 MMHG — LOW (ref 35–40)
POTASSIUM BLDA-SCNC: 3.3 MMOL/L — LOW (ref 3.4–4.5)
POTASSIUM BLDV-SCNC: 3.4 MMOL/L — SIGNIFICANT CHANGE UP (ref 3.4–4.5)
POTASSIUM SERPL-MCNC: 3.3 MMOL/L — LOW (ref 3.5–5.3)
POTASSIUM SERPL-MCNC: 3.5 MMOL/L — SIGNIFICANT CHANGE UP (ref 3.5–5.3)
POTASSIUM SERPL-MCNC: 3.7 MMOL/L — SIGNIFICANT CHANGE UP (ref 3.5–5.3)
POTASSIUM SERPL-SCNC: 3.3 MMOL/L — LOW (ref 3.5–5.3)
POTASSIUM SERPL-SCNC: 3.5 MMOL/L — SIGNIFICANT CHANGE UP (ref 3.5–5.3)
POTASSIUM SERPL-SCNC: 3.7 MMOL/L — SIGNIFICANT CHANGE UP (ref 3.5–5.3)
PROT SERPL-MCNC: 6.3 G/DL — SIGNIFICANT CHANGE UP (ref 6–8.3)
RBC # BLD: 4.52 M/UL — SIGNIFICANT CHANGE UP (ref 3.8–5.2)
RBC # FLD: 22.9 % — HIGH (ref 10.3–14.5)
SAO2 % BLDA: 100 % — HIGH (ref 95–99)
SAO2 % BLDV: 62.5 % — SIGNIFICANT CHANGE UP (ref 60–85)
SODIUM BLDA-SCNC: 127 MMOL/L — LOW (ref 136–146)
SODIUM SERPL-SCNC: 129 MMOL/L — LOW (ref 135–145)
SODIUM SERPL-SCNC: 131 MMOL/L — LOW (ref 135–145)
SODIUM SERPL-SCNC: 131 MMOL/L — LOW (ref 135–145)
WBC # BLD: 6.72 K/UL — SIGNIFICANT CHANGE UP (ref 3.8–10.5)
WBC # FLD AUTO: 6.72 K/UL — SIGNIFICANT CHANGE UP (ref 3.8–10.5)

## 2018-05-28 PROCEDURE — 99233 SBSQ HOSP IP/OBS HIGH 50: CPT | Mod: GC

## 2018-05-28 PROCEDURE — 71045 X-RAY EXAM CHEST 1 VIEW: CPT | Mod: 26

## 2018-05-28 RX ORDER — CALCIUM GLUCONATE 100 MG/ML
2 VIAL (ML) INTRAVENOUS ONCE
Qty: 0 | Refills: 0 | Status: DISCONTINUED | OUTPATIENT
Start: 2018-05-28 | End: 2018-05-28

## 2018-05-28 RX ORDER — MAGNESIUM SULFATE 500 MG/ML
2 VIAL (ML) INJECTION ONCE
Qty: 0 | Refills: 0 | Status: COMPLETED | OUTPATIENT
Start: 2018-05-28 | End: 2018-05-28

## 2018-05-28 RX ORDER — POTASSIUM CHLORIDE 20 MEQ
20 PACKET (EA) ORAL ONCE
Qty: 0 | Refills: 0 | Status: COMPLETED | OUTPATIENT
Start: 2018-05-28 | End: 2018-05-28

## 2018-05-28 RX ORDER — CALCIUM GLUCONATE 100 MG/ML
2 VIAL (ML) INTRAVENOUS ONCE
Qty: 0 | Refills: 0 | Status: COMPLETED | OUTPATIENT
Start: 2018-05-28 | End: 2018-05-28

## 2018-05-28 RX ORDER — CALCIUM GLUCONATE 100 MG/ML
2 VIAL (ML) INTRAVENOUS EVERY 4 HOURS
Qty: 0 | Refills: 0 | Status: COMPLETED | OUTPATIENT
Start: 2018-05-28 | End: 2018-05-28

## 2018-05-28 RX ORDER — POTASSIUM CHLORIDE 20 MEQ
40 PACKET (EA) ORAL EVERY 4 HOURS
Qty: 0 | Refills: 0 | Status: COMPLETED | OUTPATIENT
Start: 2018-05-28 | End: 2018-05-28

## 2018-05-28 RX ORDER — HYDRALAZINE HCL 50 MG
50 TABLET ORAL THREE TIMES A DAY
Qty: 0 | Refills: 0 | Status: DISCONTINUED | OUTPATIENT
Start: 2018-05-28 | End: 2018-06-01

## 2018-05-28 RX ORDER — ACETAMINOPHEN 500 MG
650 TABLET ORAL EVERY 6 HOURS
Qty: 0 | Refills: 0 | Status: DISCONTINUED | OUTPATIENT
Start: 2018-05-28 | End: 2018-06-01

## 2018-05-28 RX ADMIN — Medication 40 MILLIEQUIVALENT(S): at 15:46

## 2018-05-28 RX ADMIN — Medication 1334 MILLIGRAM(S): at 13:07

## 2018-05-28 RX ADMIN — Medication 40 MILLIEQUIVALENT(S): at 19:47

## 2018-05-28 RX ADMIN — Medication 1 TABLET(S): at 22:55

## 2018-05-28 RX ADMIN — HEPARIN SODIUM 5000 UNIT(S): 5000 INJECTION INTRAVENOUS; SUBCUTANEOUS at 06:33

## 2018-05-28 RX ADMIN — ISOSORBIDE DINITRATE 10 MILLIGRAM(S): 5 TABLET ORAL at 22:55

## 2018-05-28 RX ADMIN — Medication 200 GRAM(S): at 17:34

## 2018-05-28 RX ADMIN — Medication 81 MILLIGRAM(S): at 13:06

## 2018-05-28 RX ADMIN — FAMOTIDINE 20 MILLIGRAM(S): 10 INJECTION INTRAVENOUS at 17:34

## 2018-05-28 RX ADMIN — GABAPENTIN 100 MILLIGRAM(S): 400 CAPSULE ORAL at 06:33

## 2018-05-28 RX ADMIN — SODIUM CHLORIDE 3 MILLILITER(S): 9 INJECTION INTRAMUSCULAR; INTRAVENOUS; SUBCUTANEOUS at 23:00

## 2018-05-28 RX ADMIN — Medication 1334 MILLIGRAM(S): at 17:34

## 2018-05-28 RX ADMIN — CHLORHEXIDINE GLUCONATE 1 APPLICATION(S): 213 SOLUTION TOPICAL at 13:07

## 2018-05-28 RX ADMIN — Medication 1 TABLET(S): at 13:06

## 2018-05-28 RX ADMIN — SODIUM CHLORIDE 3 MILLILITER(S): 9 INJECTION INTRAMUSCULAR; INTRAVENOUS; SUBCUTANEOUS at 13:04

## 2018-05-28 RX ADMIN — Medication 1 TABLET(S): at 17:34

## 2018-05-28 RX ADMIN — SODIUM CHLORIDE 3 MILLILITER(S): 9 INJECTION INTRAMUSCULAR; INTRAVENOUS; SUBCUTANEOUS at 06:34

## 2018-05-28 RX ADMIN — Medication 200 GRAM(S): at 09:32

## 2018-05-28 RX ADMIN — ISOSORBIDE DINITRATE 10 MILLIGRAM(S): 5 TABLET ORAL at 15:46

## 2018-05-28 RX ADMIN — Medication 650 MILLIGRAM(S): at 09:31

## 2018-05-28 RX ADMIN — GABAPENTIN 100 MILLIGRAM(S): 400 CAPSULE ORAL at 15:46

## 2018-05-28 RX ADMIN — Medication 17.05 MICROGRAM(S)/KG/MIN: at 22:52

## 2018-05-28 RX ADMIN — Medication 650 MILLIGRAM(S): at 10:32

## 2018-05-28 RX ADMIN — MAGNESIUM OXIDE 400 MG ORAL TABLET 400 MILLIGRAM(S): 241.3 TABLET ORAL at 09:32

## 2018-05-28 RX ADMIN — Medication 37.5 MILLIGRAM(S): at 06:33

## 2018-05-28 RX ADMIN — Medication 2: at 13:07

## 2018-05-28 RX ADMIN — Medication 50 MILLIGRAM(S): at 22:55

## 2018-05-28 RX ADMIN — HEPARIN SODIUM 5000 UNIT(S): 5000 INJECTION INTRAVENOUS; SUBCUTANEOUS at 17:33

## 2018-05-28 RX ADMIN — MAGNESIUM OXIDE 400 MG ORAL TABLET 400 MILLIGRAM(S): 241.3 TABLET ORAL at 17:34

## 2018-05-28 RX ADMIN — Medication 17.05 MICROGRAM(S)/KG/MIN: at 19:47

## 2018-05-28 RX ADMIN — Medication 200 GRAM(S): at 22:45

## 2018-05-28 RX ADMIN — Medication 150 MICROGRAM(S): at 06:33

## 2018-05-28 RX ADMIN — Medication 50 GRAM(S): at 22:45

## 2018-05-28 RX ADMIN — ISOSORBIDE DINITRATE 10 MILLIGRAM(S): 5 TABLET ORAL at 06:33

## 2018-05-28 RX ADMIN — Medication 1 TABLET(S): at 06:33

## 2018-05-28 RX ADMIN — Medication 50 MILLIGRAM(S): at 15:46

## 2018-05-28 RX ADMIN — Medication 1000 UNIT(S): at 13:06

## 2018-05-28 RX ADMIN — Medication 1 TABLET(S): at 00:00

## 2018-05-28 RX ADMIN — FAMOTIDINE 20 MILLIGRAM(S): 10 INJECTION INTRAVENOUS at 06:33

## 2018-05-28 RX ADMIN — CALCITRIOL 0.25 MICROGRAM(S): 0.5 CAPSULE ORAL at 22:54

## 2018-05-28 RX ADMIN — GABAPENTIN 100 MILLIGRAM(S): 400 CAPSULE ORAL at 22:54

## 2018-05-28 RX ADMIN — Medication 1334 MILLIGRAM(S): at 09:32

## 2018-05-28 RX ADMIN — Medication 20 MILLIEQUIVALENT(S): at 23:04

## 2018-05-28 NOTE — PROGRESS NOTE ADULT - SUBJECTIVE AND OBJECTIVE BOX
NEPHROLOGY - NSN    Patient seen and examined. Feels well.     MEDICATIONS  (STANDING):  aspirin enteric coated 81 milliGRAM(s) Oral daily  calcitriol   Capsule 0.25 MICROGram(s) Oral at bedtime  calcium acetate 1334 milliGRAM(s) Oral three times a day with meals  calcium carbonate 500 mG (Tums) Chewable 1 Tablet(s) Chew four times a day  calcium gluconate IVPB 2 Gram(s) IV Intermittent every 4 hours  chlorhexidine 4% Liquid 1 Application(s) Topical <User Schedule>  cholecalciferol 1000 Unit(s) Oral daily  dextrose 5%. 1000 milliLiter(s) (50 mL/Hr) IV Continuous <Continuous>  dextrose 50% Injectable 12.5 Gram(s) IV Push once  dextrose 50% Injectable 25 Gram(s) IV Push once  dextrose 50% Injectable 25 Gram(s) IV Push once  DOBUTamine Infusion 5 MICROgram(s)/kG/Min (17.055 mL/Hr) IV Continuous <Continuous>  famotidine    Tablet 20 milliGRAM(s) Oral two times a day  gabapentin 100 milliGRAM(s) Oral every 8 hours  heparin  Injectable 5000 Unit(s) SubCutaneous every 12 hours  hydrALAZINE 50 milliGRAM(s) Oral three times a day  insulin lispro (HumaLOG) corrective regimen sliding scale   SubCutaneous three times a day before meals  insulin lispro (HumaLOG) corrective regimen sliding scale   SubCutaneous at bedtime  isosorbide   dinitrate Tablet (ISORDIL) 10 milliGRAM(s) Oral every 8 hours  levothyroxine 150 MICROGram(s) Oral daily  magnesium oxide 400 milliGRAM(s) Oral two times a day with meals  potassium chloride    Tablet ER 40 milliEquivalent(s) Oral every 4 hours  sodium chloride 0.9% lock flush 3 milliLiter(s) IV Push every 8 hours    VITALS:  T(C): , Max: 36.7 (05-28-18 @ 13:00)  T(F): , Max: 98 (05-28-18 @ 13:00)  HR: 91 (05-28-18 @ 15:00)  BP: 121/74 (05-28-18 @ 15:00)  BP(mean): 83 (05-28-18 @ 15:00)  RR: 25 (05-28-18 @ 15:00)  SpO2: 100% (05-28-18 @ 15:00)  Wt(kg): --  I and O's:    05-27 @ 07:01  -  05-28 @ 07:00  --------------------------------------------------------  IN: 2185.9 mL / OUT: 6585 mL / NET: -4399.1 mL    05-28 @ 07:01  -  05-28 @ 15:51  --------------------------------------------------------  IN: 496 mL / OUT: 1070 mL / NET: -574 mL      Constitutional: NAD  HEENT: MICHELLE Phillips  Respiratory: diminished B/L  Cardiovascular: S1 and S2  Gastrointestinal: BS+, soft, NT, mildly distended/edema  Extremities: + edema  Neurological: AAO x 3  : + [place]af[place]  Access: Not applicable    LABS:                        10.8   6.72  )-----------( 125      ( 28 May 2018 06:01 )             32.2     05-28    129<L>  |  85<L>  |  14  ----------------------------<  134<H>  3.3<L>   |  32<H>  |  0.97    Ca    6.6<L>      28 May 2018 13:40  Phos  2.8     05-28  Mg     2.0     05-28    TPro  6.3  /  Alb  2.9<L>  /  TBili  3.5<H>  /  DBili  x   /  AST  42<H>  /  ALT  37<H>  /  AlkPhos  231<H>  05-28

## 2018-05-28 NOTE — PROGRESS NOTE ADULT - PROBLEM SELECTOR PLAN 1
Continues on dobutamine gtt @ 5 mcg and Bumex gtt @ 1 mg/hr. CVP 9-18 with this am JACKSON calculation co 5.3,, CI 2.3, and . - Continues on dobutamine gtt @ 5 mcg and Bumex gtt @ 1 mg/hr. CVP 9-18 with this am JACKSON calculation co 5.3,, CI 2.3, and . - Continues on dobutamine gtt @ 5 mcg. CVP 16 with this am JACKSON calculation co 5.9, CI 2.6, and . - Continues on dobutamine gtt @ 5 mcg. CVP 16 mm Hg with this am JACKSON calculation co 5.9 L/min, CI 2.6 L/min/sqm, and  dynes sec cm(-5) .

## 2018-05-28 NOTE — PROGRESS NOTE ADULT - SUBJECTIVE AND OBJECTIVE BOX
HPI/INTERVAL HISTORY:  Patient seen and examined at bedside.    OBJECTIVE:  VITAL SIGNS:  ICU Vital Signs Last 24 Hrs  T(C): 36.6 (27 May 2018 12:00), Max: 36.6 (27 May 2018 12:00)  T(F): 97.9 (27 May 2018 12:00), Max: 97.9 (27 May 2018 12:00)  HR: 92 (28 May 2018 06:00) (86 - 100)  BP: 120/66 (28 May 2018 06:00) (104/60 - 132/69)  BP(mean): 79 (28 May 2018 06:00) (70 - 93)  ABP: --  ABP(mean): --  RR: 16 (28 May 2018 06:00) (11 - 22)  SpO2: 100% (28 May 2018 06:00) (96% - 100%)        05-27 @ 07:01  -  05-28 @ 07:00  --------------------------------------------------------  IN: 2185.9 mL / OUT: 6585 mL / NET: -4399.1 mL      CAPILLARY BLOOD GLUCOSE      POCT Blood Glucose.: 212 mg/dL (27 May 2018 22:15)      PHYSICAL EXAM:  Gen:   HEENT: NC/AT; PERRL, anicteric sclera  Neck: supple, no JVD  Resp: clear to ausculation B/L; no wheezes, rales or rhonchi  Cardiovasc: S1S2 normal; RRR; no murmurs, rubs or gallops  GI: soft, nondistended, nontender; +BS  Extr: warm, well-perfused, PT/DP pulses 2+ B/L; no LE edema  Skin: normal color and turgor  Neuro:     LABS:                        10.8   6.72  )-----------( 125      ( 28 May 2018 06:01 )             32.2     05-28    131<L>  |  86<L>  |  17  ----------------------------<  114<H>  3.5   |  34<H>  |  1.11    Ca    6.0<LL>      28 May 2018 06:01  Phos  2.9     05-28  Mg     2.0     05-28    TPro  6.3  /  Alb  2.9<L>  /  TBili  3.5<H>  /  DBili  x   /  AST  42<H>  /  ALT  37<H>  /  AlkPhos  231<H>  05-28    LIVER FUNCTIONS - ( 28 May 2018 06:01 )  Alb: 2.9 g/dL / Pro: 6.3 g/dL / ALK PHOS: 231 u/L / ALT: 37 u/L / AST: 42 u/L / GGT: x                     RADIOLOGY & ADDITIONAL TESTS: Reviewed.    aspirin enteric coated 81 milliGRAM(s) Oral daily  calcitriol   Capsule 0.25 MICROGram(s) Oral at bedtime  calcium acetate 1334 milliGRAM(s) Oral three times a day with meals  calcium carbonate 500 mG (Tums) Chewable 1 Tablet(s) Chew four times a day  chlorhexidine 4% Liquid 1 Application(s) Topical <User Schedule>  cholecalciferol 1000 Unit(s) Oral daily  dextrose 40% Gel 15 Gram(s) Oral once PRN  dextrose 5%. 1000 milliLiter(s) IV Continuous <Continuous>  dextrose 50% Injectable 12.5 Gram(s) IV Push once  dextrose 50% Injectable 25 Gram(s) IV Push once  dextrose 50% Injectable 25 Gram(s) IV Push once  DOBUTamine Infusion 5 MICROgram(s)/kG/Min IV Continuous <Continuous>  famotidine    Tablet 20 milliGRAM(s) Oral two times a day  gabapentin 100 milliGRAM(s) Oral every 8 hours  glucagon  Injectable 1 milliGRAM(s) IntraMuscular once PRN  heparin  Injectable 5000 Unit(s) SubCutaneous every 12 hours  hydrALAZINE 37.5 milliGRAM(s) Oral every 8 hours  insulin lispro (HumaLOG) corrective regimen sliding scale   SubCutaneous three times a day before meals  insulin lispro (HumaLOG) corrective regimen sliding scale   SubCutaneous at bedtime  isosorbide   dinitrate Tablet (ISORDIL) 10 milliGRAM(s) Oral every 8 hours  levothyroxine 150 MICROGram(s) Oral daily  magnesium oxide 400 milliGRAM(s) Oral two times a day with meals  sodium chloride 0.9% lock flush 3 milliLiter(s) IV Push every 8 hours      penicillin (Rash) HPI/INTERVAL HISTORY:  Patient seen and examined at bedside.    Patient complaining of mild headache after isordil dose last night. Longstanding intermittent sharp foot and leg pain improving with diuresis likely 2/2 severe swelling on presentation. Otherwise asymptomatic. Net negative >4 L as below in the last 24 hours. On dobutamine 5 mg gtt.    OBJECTIVE:  VITAL SIGNS:  ICU Vital Signs Last 24 Hrs  T(C): 36.6 (27 May 2018 12:00), Max: 36.6 (27 May 2018 12:00)  T(F): 97.9 (27 May 2018 12:00), Max: 97.9 (27 May 2018 12:00)  HR: 92 (28 May 2018 06:00) (86 - 100)  BP: 120/66 (28 May 2018 06:00) (104/60 - 132/69)  BP(mean): 79 (28 May 2018 06:00) (70 - 93)  ABP: --  ABP(mean): --  RR: 16 (28 May 2018 06:00) (11 - 22)  SpO2: 100% (28 May 2018 06:00) (96% - 100%)        05-27 @ 07:01  -  05-28 @ 07:00  --------------------------------------------------------  IN: 2185.9 mL / OUT: 6585 mL / NET: -4399.1 mL      CAPILLARY BLOOD GLUCOSE      POCT Blood Glucose.: 212 mg/dL (27 May 2018 22:15)      PHYSICAL EXAM:  Gen: NAD, pleasant, conversant  HEENT: NC/AT; PERRL, anicteric sclera  Neck: supple, JVD estimated 11 cm.   Resp: Mild left > right basilar crackles. Otherwise CTA.  Cardiovasc: S1S2 normal; RRR; no murmurs, rubs or gallops  GI: soft, nondistended, nontender; +BS  Extr: 2+ PPE to the proximal anterior tibia b/l. palpable DP pulses present b/l.  Skin: Changes 2/2 PVD b/l LE.  Neuro: Non-focal    LABS:                        10.8   6.72  )-----------( 125      ( 28 May 2018 06:01 )             32.2     05-28    131<L>  |  86<L>  |  17  ----------------------------<  114<H>  3.5   |  34<H>  |  1.11    Ca    6.0<LL>      28 May 2018 06:01  Phos  2.9     05-28  Mg     2.0     05-28    TPro  6.3  /  Alb  2.9<L>  /  TBili  3.5<H>  /  DBili  x   /  AST  42<H>  /  ALT  37<H>  /  AlkPhos  231<H>  05-28    LIVER FUNCTIONS - ( 28 May 2018 06:01 )  Alb: 2.9 g/dL / Pro: 6.3 g/dL / ALK PHOS: 231 u/L / ALT: 37 u/L / AST: 42 u/L / GGT: x                     RADIOLOGY & ADDITIONAL TESTS: Reviewed.    aspirin enteric coated 81 milliGRAM(s) Oral daily  calcitriol   Capsule 0.25 MICROGram(s) Oral at bedtime  calcium acetate 1334 milliGRAM(s) Oral three times a day with meals  calcium carbonate 500 mG (Tums) Chewable 1 Tablet(s) Chew four times a day  chlorhexidine 4% Liquid 1 Application(s) Topical <User Schedule>  cholecalciferol 1000 Unit(s) Oral daily  dextrose 40% Gel 15 Gram(s) Oral once PRN  dextrose 5%. 1000 milliLiter(s) IV Continuous <Continuous>  dextrose 50% Injectable 12.5 Gram(s) IV Push once  dextrose 50% Injectable 25 Gram(s) IV Push once  dextrose 50% Injectable 25 Gram(s) IV Push once  DOBUTamine Infusion 5 MICROgram(s)/kG/Min IV Continuous <Continuous>  famotidine    Tablet 20 milliGRAM(s) Oral two times a day  gabapentin 100 milliGRAM(s) Oral every 8 hours  glucagon  Injectable 1 milliGRAM(s) IntraMuscular once PRN  heparin  Injectable 5000 Unit(s) SubCutaneous every 12 hours  hydrALAZINE 37.5 milliGRAM(s) Oral every 8 hours  insulin lispro (HumaLOG) corrective regimen sliding scale   SubCutaneous three times a day before meals  insulin lispro (HumaLOG) corrective regimen sliding scale   SubCutaneous at bedtime  isosorbide   dinitrate Tablet (ISORDIL) 10 milliGRAM(s) Oral every 8 hours  levothyroxine 150 MICROGram(s) Oral daily  magnesium oxide 400 milliGRAM(s) Oral two times a day with meals  sodium chloride 0.9% lock flush 3 milliLiter(s) IV Push every 8 hours      penicillin (Rash) HPI/INTERVAL HISTORY:  Patient seen and examined at bedside.    Patient complaining of mild headache after isordil dose last night. Longstanding intermittent sharp foot and leg pain improving with diuresis likely 2/2 severe swelling on presentation. Otherwise asymptomatic. Net negative >4 L as below in the last 24 hours. On dobutamine 5 mg gtt.    OBJECTIVE:  VITAL SIGNS:  ICU Vital Signs Last 24 Hrs  T(C): 36.6 (27 May 2018 12:00), Max: 36.6 (27 May 2018 12:00)  T(F): 97.9 (27 May 2018 12:00), Max: 97.9 (27 May 2018 12:00)  HR: 92 (28 May 2018 06:00) (86 - 100)  BP: 120/66 (28 May 2018 06:00) (104/60 - 132/69)  BP(mean): 79 (28 May 2018 06:00) (70 - 93)  ABP: --  ABP(mean): --  RR: 16 (28 May 2018 06:00) (11 - 22)  SpO2: 100% (28 May 2018 06:00) (96% - 100%)    05-27 @ 07:01  -  05-28 @ 07:00  --------------------------------------------------------  IN: 2185.9 mL / OUT: 6585 mL / NET: -4399.1 mL    CAPILLARY BLOOD GLUCOSE    POCT Blood Glucose.: 212 mg/dL (27 May 2018 22:15)    PHYSICAL EXAM:  Gen: NAD, pleasant, conversant  HEENT: NC/AT; PERRL, anicteric sclera  Neck: supple, JVD estimated 11 cm.   Resp: Mild left > right basilar crackles. Otherwise CTA.  Cardiovasc: S1S2 normal; RRR; no murmurs, rubs or gallops  GI: soft, nondistended, nontender; +BS  Extr: 2+ PPE to the proximal anterior tibia b/l. palpable DP pulses present b/l.  Skin: Changes 2/2 PVD b/l LE.  Neuro: Non-focal    LABS:                        10.8   6.72  )-----------( 125      ( 28 May 2018 06:01 )             32.2     05-28    131<L>  |  86<L>  |  17  ----------------------------<  114<H>  3.5   |  34<H>  |  1.11    Ca    6.0<LL>      28 May 2018 06:01  Phos  2.9     05-28  Mg     2.0     05-28    TPro  6.3  /  Alb  2.9<L>  /  TBili  3.5<H>  /  DBili  x   /  AST  42<H>  /  ALT  37<H>  /  AlkPhos  231<H>  05-28    LIVER FUNCTIONS - ( 28 May 2018 06:01 )  Alb: 2.9 g/dL / Pro: 6.3 g/dL / ALK PHOS: 231 u/L / ALT: 37 u/L / AST: 42 u/L / GGT: x                     RADIOLOGY & ADDITIONAL TESTS: Reviewed.    aspirin enteric coated 81 milliGRAM(s) Oral daily  calcitriol   Capsule 0.25 MICROGram(s) Oral at bedtime  calcium acetate 1334 milliGRAM(s) Oral three times a day with meals  calcium carbonate 500 mG (Tums) Chewable 1 Tablet(s) Chew four times a day  chlorhexidine 4% Liquid 1 Application(s) Topical <User Schedule>  cholecalciferol 1000 Unit(s) Oral daily  dextrose 40% Gel 15 Gram(s) Oral once PRN  dextrose 5%. 1000 milliLiter(s) IV Continuous <Continuous>  dextrose 50% Injectable 12.5 Gram(s) IV Push once  dextrose 50% Injectable 25 Gram(s) IV Push once  dextrose 50% Injectable 25 Gram(s) IV Push once  DOBUTamine Infusion 5 MICROgram(s)/kG/Min IV Continuous <Continuous>  famotidine    Tablet 20 milliGRAM(s) Oral two times a day  gabapentin 100 milliGRAM(s) Oral every 8 hours  glucagon  Injectable 1 milliGRAM(s) IntraMuscular once PRN  heparin  Injectable 5000 Unit(s) SubCutaneous every 12 hours  hydrALAZINE 37.5 milliGRAM(s) Oral every 8 hours  insulin lispro (HumaLOG) corrective regimen sliding scale   SubCutaneous three times a day before meals  insulin lispro (HumaLOG) corrective regimen sliding scale   SubCutaneous at bedtime  isosorbide   dinitrate Tablet (ISORDIL) 10 milliGRAM(s) Oral every 8 hours  levothyroxine 150 MICROGram(s) Oral daily  magnesium oxide 400 milliGRAM(s) Oral two times a day with meals  sodium chloride 0.9% lock flush 3 milliLiter(s) IV Push every 8 hours      penicillin (Rash)

## 2018-05-28 NOTE — PROGRESS NOTE ADULT - SUBJECTIVE AND OBJECTIVE BOX
Subjective: No chest pain; sob improving LE edema improving        MEDICATIONS  (STANDING):  aspirin enteric coated 81 milliGRAM(s) Oral daily  calcitriol   Capsule 0.25 MICROGram(s) Oral at bedtime  calcium acetate 1334 milliGRAM(s) Oral three times a day with meals  calcium carbonate 500 mG (Tums) Chewable 1 Tablet(s) Chew four times a day  chlorhexidine 4% Liquid 1 Application(s) Topical <User Schedule>  cholecalciferol 1000 Unit(s) Oral daily  dextrose 5%. 1000 milliLiter(s) (50 mL/Hr) IV Continuous <Continuous>  dextrose 50% Injectable 12.5 Gram(s) IV Push once  dextrose 50% Injectable 25 Gram(s) IV Push once  dextrose 50% Injectable 25 Gram(s) IV Push once  DOBUTamine Infusion 5 MICROgram(s)/kG/Min (17.055 mL/Hr) IV Continuous <Continuous>  famotidine    Tablet 20 milliGRAM(s) Oral two times a day  gabapentin 100 milliGRAM(s) Oral every 8 hours  heparin  Injectable 5000 Unit(s) SubCutaneous every 12 hours  hydrALAZINE 50 milliGRAM(s) Oral three times a day  insulin lispro (HumaLOG) corrective regimen sliding scale   SubCutaneous three times a day before meals  insulin lispro (HumaLOG) corrective regimen sliding scale   SubCutaneous at bedtime  isosorbide   dinitrate Tablet (ISORDIL) 10 milliGRAM(s) Oral every 8 hours  levothyroxine 150 MICROGram(s) Oral daily  magnesium oxide 400 milliGRAM(s) Oral two times a day with meals  sodium chloride 0.9% lock flush 3 milliLiter(s) IV Push every 8 hours    MEDICATIONS  (PRN):  acetaminophen   Tablet. 650 milliGRAM(s) Oral every 6 hours PRN Moderate Pain (4 - 6)  dextrose 40% Gel 15 Gram(s) Oral once PRN Blood Glucose LESS THAN 70 milliGRAM(s)/deciliter  glucagon  Injectable 1 milliGRAM(s) IntraMuscular once PRN Glucose LESS THAN 70 milligrams/deciliter      LABS:                        10.8   6.72  )-----------( 125      ( 28 May 2018 06:01 )             32.2     Hemoglobin: 10.8 g/dL ( @ 06:01)  Hemoglobin: 11.2 g/dL ( @ 06:23)  Hemoglobin: 11.2 g/dL ( @ 12:30)  Hemoglobin: 11.2 g/dL ( @ 06:16)  Hemoglobin: 12.1 g/dL ( @ 07:20)        131<L>  |  86<L>  |  17  ----------------------------<  114<H>  3.5   |  34<H>  |  1.11    Ca    6.0<LL>      28 May 2018 06:01  Phos  2.9       Mg     2.0         TPro  6.3  /  Alb  2.9<L>  /  TBili  3.5<H>  /  DBili  x   /  AST  42<H>  /  ALT  37<H>  /  AlkPhos  231<H>      Creatinine Trend: 1.11<--, 1.02<--, 0.93<--, 1.03<--, 1.08<--, 1.07<--           PHYSICAL EXAM  Vital Signs Last 24 Hrs  T(C): --  T(F): --  HR: 90 (28 May 2018 12:00) (86 - 100)  BP: 126/81 (28 May 2018 12:00) (104/60 - 132/69)  BP(mean): 91 (28 May 2018 12:00) (70 - 94)  RR: 15 (28 May 2018 12:00) (11 - 22)  SpO2: 100% (28 May 2018 12:00) (96% - 100%)    Heart: normal S1, S2, RRR, no m/r/g  Lungs: cta bilaterally  Abd: soft nT, nD  Ext: 1+ pitting edema bilaterally; warm to touch     TELEMETRY: 	    ECG:  	  RADIOLOGY:   < from: Xray Chest 1 View- PORTABLE-Urgent (18 @ 10:32) >  INTERPRETATION:     May 26 at 7:15 AM:  Manley-Radha catheter seen with its tip in the right interlobar artery   slightly advanced since the last study.    Lungs are clear, heart is enlarged and no effusion or pneumothorax.    9:39 AM:  Tip of the Manley-Radha catheter has been pulled back so it is now in the   right main pulmonary artery segment. Left-sided AICD unchanged, stable   cardiomegaly and clear lungs.      COMPARISON:  May 25      IMPRESSION:  Follow-up studies post Manley-Radha catheter placement and     < end of copied text >    DIAGNOSTIC TESTING:  [ ] Echocardiogram: < from: TTE with Doppler (w/Cont) (18 @ 20:04) >  CONCLUSIONS:  1. Mitral annular calcification, otherwise normal mitral  valve. Moderate mitral regurgitation.  2. Moderately dilated left atrium.  LA volume index = 47  cc/m2.  3. Mild left ventricular enlargement.  4. Severe global left ventricular systolic dysfunction.  Endocardial visualization enhanced with intravenous  injection of echo contrast (Definity).  No LV thrombus  seen.  5. Right ventricular enlargement with decreased right  ventricular systolic function.  A device wire is noted in  the right heart.  6. Estimated right ventricular systolic pressure equals 42  mm Hg, assuming right atrial pressure equals 10 mm Hg,  consistent with mild pulmonary hypertension.  7.Normal tricuspid valve.  Severe tricuspid regurgitation.  *** Compared with echocardiogram of 2017, left  ventricular systolic function has deteriorated.    < end of copied text >    [ ]  Catheterization:  < from: Cardiac Cath Lab (14 @ 16:49) >  CORONARY VESSELS: The coronary circulation is co-dominant.  LM:   -- LM: Normal.  LAD:   --  LAD: Normal.  CX:   --  Circumflex: Normal.  RCA:   --  RCA: Normal.  COMPLICATIONS: There were no complications.    < end of copied text >    [ ] Stress Test:    OTHER: 	      ASSESSMENT/PLAN: 	62y Female with severe NICM s/p AICD, HTN, DM admitted with acute on chronic systolic heart failure.   -Pt still volume overloaded but warm to touch   -pt chest pressure likely secondary to heart failure - given negative enzymes and prior cardiac cath in  showing normal coronary arteries, no urgent coronary angiogram needed at this time  -management of heart failure per cardiology    c/w  assisted diuresis off bumex gtt   -follow up renal  f/u endo  RE; hypocalcemia not responding to supplementation     supplement electrolytes as required   -cont care ccu

## 2018-05-28 NOTE — PROGRESS NOTE ADULT - ASSESSMENT
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  She is grossly volume overloaded.  The patient has hypervolemic hyponatremia likely secondary to heart failure.  She also has hypocalcemia likely secondary to the fact that she likely has no parathyroid gland. Acute decompensated heart failure. Recommended for LVAD/heart transplant per CACT/HF -- Cardiac index remains adequate per cardiology     Endocrine. continue p.o. calcium supplementation and rocaltrol. calcium slowly improving  pending endo consult, supplements per CCU  Renal.   Diuresis per CACT/HF team. Bumex d/c. On .  Maintain K >3.5 per CCU  Renal function intact. Maintain fluid restriction, daily weights, I&O's  goal negative 2 L, was ~4.4 L net negative over the past 24 hours	  Gastrointestinal.  likely congestive hepatic pathology.  Hypervolemic hyponatremia Na ~ stable   Hyperphosphatemia - now on low side of normal limits due to calcium carbonate tabs  Appreciate current CCU care	    Nannette Rice NP  [facility]  [phone]

## 2018-05-28 NOTE — PROGRESS NOTE ADULT - ASSESSMENT
62yFemale with severe NICM s/p MDT AICD, HTN, DM, history of thyroid CA, obesity admitted with acute on chronic systolic heart failure.      Problem/Plan - 1:  ·  Problem: Acute on chronic systolic congestive heart failure with severe Cardiomyopathy S/P AICD  .  Plan: S/P RHC. Diuresed very well . Holding Bumex to avoid over diuresis . HF team recommending evaluation for LVAD/Transplant .       Problem/Plan - 2:  ·  Problem: Cardiogenic Shock .  Plan: S/P RHC. On Dobutamine.     Problem/Plan - 3:  ·  Problem: Essential hypertension.  Plan: BP readings fine. Low salt diet.  Continue BP meds.      Problem/Plan - 4:  ·  Problem: Diabetes mellitus, type 2.  Plan: Sugars in acceptable range .   FS QID  HISS   DM diet.      Problem/Plan - 5:  ·  Problem: Hypocalcemia .  Plan :Replacing. Renal helping. Will need IV replacement also.      Problem/Plan -6:  ·  Problem: Abnormal LFT  .  Plan : Secondary to Hepatic congestion from CHF . GI helping.      Problem/Plan -7:  ·  Problem: MAGNUS  .  Plan : Resolved. Renal helping.       Problem/Plan -8:  Problem: Need for prophylactic measure. Plan: VTE with Heparin 5000U sub cut BID.  Fall, Aspirations and safety precautions,

## 2018-05-28 NOTE — PROGRESS NOTE ADULT - SUBJECTIVE AND OBJECTIVE BOX
INTERVAL HPI/OVERNIGHT EVENTS: I feel better and pain ankles gone.   Vital Signs Last 24 Hrs  T(C): --  T(F): --  HR: 90 (28 May 2018 12:00) (86 - 100)  BP: 126/81 (28 May 2018 12:00) (104/60 - 132/69)  BP(mean): 91 (28 May 2018 12:00) (70 - 94)  RR: 15 (28 May 2018 12:00) (11 - 22)  SpO2: 100% (28 May 2018 12:00) (96% - 100%)  I&O's Summary    27 May 2018 07:01  -  28 May 2018 07:00  --------------------------------------------------------  IN: 2185.9 mL / OUT: 6585 mL / NET: -4399.1 mL    28 May 2018 07:01  -  28 May 2018 12:39  --------------------------------------------------------  IN: 305 mL / OUT: 525 mL / NET: -220 mL      MEDICATIONS  (STANDING):  aspirin enteric coated 81 milliGRAM(s) Oral daily  calcitriol   Capsule 0.25 MICROGram(s) Oral at bedtime  calcium acetate 1334 milliGRAM(s) Oral three times a day with meals  calcium carbonate 500 mG (Tums) Chewable 1 Tablet(s) Chew four times a day  chlorhexidine 4% Liquid 1 Application(s) Topical <User Schedule>  cholecalciferol 1000 Unit(s) Oral daily  dextrose 5%. 1000 milliLiter(s) (50 mL/Hr) IV Continuous <Continuous>  dextrose 50% Injectable 12.5 Gram(s) IV Push once  dextrose 50% Injectable 25 Gram(s) IV Push once  dextrose 50% Injectable 25 Gram(s) IV Push once  DOBUTamine Infusion 5 MICROgram(s)/kG/Min (17.055 mL/Hr) IV Continuous <Continuous>  famotidine    Tablet 20 milliGRAM(s) Oral two times a day  gabapentin 100 milliGRAM(s) Oral every 8 hours  heparin  Injectable 5000 Unit(s) SubCutaneous every 12 hours  hydrALAZINE 50 milliGRAM(s) Oral three times a day  insulin lispro (HumaLOG) corrective regimen sliding scale   SubCutaneous three times a day before meals  insulin lispro (HumaLOG) corrective regimen sliding scale   SubCutaneous at bedtime  isosorbide   dinitrate Tablet (ISORDIL) 10 milliGRAM(s) Oral every 8 hours  levothyroxine 150 MICROGram(s) Oral daily  magnesium oxide 400 milliGRAM(s) Oral two times a day with meals  sodium chloride 0.9% lock flush 3 milliLiter(s) IV Push every 8 hours    MEDICATIONS  (PRN):  acetaminophen   Tablet. 650 milliGRAM(s) Oral every 6 hours PRN Moderate Pain (4 - 6)  dextrose 40% Gel 15 Gram(s) Oral once PRN Blood Glucose LESS THAN 70 milliGRAM(s)/deciliter  glucagon  Injectable 1 milliGRAM(s) IntraMuscular once PRN Glucose LESS THAN 70 milligrams/deciliter    LABS:                        10.8   6.72  )-----------( 125      ( 28 May 2018 06:01 )             32.2     05-28    131<L>  |  86<L>  |  17  ----------------------------<  114<H>  3.5   |  34<H>  |  1.11    Ca    6.0<LL>      28 May 2018 06:01  Phos  2.9     05-28  Mg     2.0     05-28    TPro  6.3  /  Alb  2.9<L>  /  TBili  3.5<H>  /  DBili  x   /  AST  42<H>  /  ALT  37<H>  /  AlkPhos  231<H>  05-28        CAPILLARY BLOOD GLUCOSE      POCT Blood Glucose.: 93 mg/dL (28 May 2018 08:52)  POCT Blood Glucose.: 212 mg/dL (27 May 2018 22:15)  POCT Blood Glucose.: 124 mg/dL (27 May 2018 17:28)      ABG - ( 28 May 2018 06:01 )  pH, Arterial: 7.53  pH, Blood: x     /  pCO2: 43    /  pO2: 115   / HCO3: 36    / Base Excess: 12.3  /  SaO2: 100                 REVIEW OF SYSTEMS:  CONSTITUTIONAL: No fever, weight loss, or fatigue  EYES: No eye pain, visual disturbances, or discharge  ENMT:  No difficulty hearing, tinnitus, vertigo; No sinus or throat pain  NECK: No pain or stiffness  BREASTS: No pain, masses, or nipple discharge  RESPIRATORY: No cough, wheezing, chills or hemoptysis; No shortness of breath  CARDIOVASCULAR: No chest pain, palpitations, dizziness, or leg swelling  GASTROINTESTINAL: No abdominal or epigastric pain. No nausea, vomiting, or hematemesis; No diarrhea or constipation. No melena or hematochezia.  GENITOURINARY: No dysuria, frequency, hematuria, or incontinence  NEUROLOGICAL: No headaches, memory loss, loss of strength, numbness, or tremors  SKIN: No itching, burning, rashes, or lesions   LYMPH NODES: No enlarged glands  ENDOCRINE: No heat or cold intolerance; No hair loss    Consultant(s) Notes Reviewed:  [x ] YES  [ ] NO    PHYSICAL EXAM:  GENERAL: NAD, well-groomed, well-developed, not in any distress ,  HEAD:  Atraumatic, Normocephalic  EYES: EOMI, PERRLA, conjunctiva and sclera clear  ENMT: No tonsillar erythema, exudates, or enlargement; Moist mucous membranes, Good dentition, No lesions  NECK: Supple, No JVD, Normal thyroid  NERVOUS SYSTEM:  Alert & Oriented X3, No focal deficit   CHEST/LUNG: Good air entry bilateral with no  rales, rhonchi, wheezing, or rubs  HEART: Regular rate and rhythm; No murmurs, rubs, or gallops  ABDOMEN: Soft, Nontender, Nondistended; Bowel sounds present  EXTREMITIES:  No clubbing, cyanosis, less  edema    Care Discussed with Consultants/Other Providers [ x] YES  [ ] NO

## 2018-05-28 NOTE — PROGRESS NOTE ADULT - PROBLEM SELECTOR PLAN 2
Continues to diurese well with 9.7L output over 24 hrs. Wt. down to 101 kg from 107.3 yesterday. Continue bumex and dobutamine as noted. Hydralazine titrated up to 37.5 mg TID will consider continuing to increase as BP tolerates. Repleting K and Mg as needed. Check BMPs at least Q6hr. Per HF recs patient will need eventual evaluation for LVAD/transplant. - Remains hypocalcemic, continue replacement per renal. Check ionized calcium.  - On further history patient has had hypocalcemia chronically that started after thyroidectomy in 2000. Suspect primary hypoparathyroidism.   - Repeat Vit D25 wnl, PTH borderline low normal.  - Patient is asymptomatic, QTc unreliable in the setting of pacemaker. Would recommend 2 g calcium carbonate IV and consider slow infusion.  - Appreciate HF opinion on electrolyte induced cardiomyopathy?  - Appreciate renal input.   - Consult endocrine.

## 2018-05-28 NOTE — PROGRESS NOTE ADULT - SUBJECTIVE AND OBJECTIVE BOX
pt seen and examined, no complaints on exam.     BP stable . tele stable overnight    aspirin enteric coated 81 milliGRAM(s) Oral daily  calcitriol   Capsule 0.25 MICROGram(s) Oral at bedtime  calcium acetate 1334 milliGRAM(s) Oral three times a day with meals  calcium carbonate 500 mG (Tums) Chewable 1 Tablet(s) Chew four times a day  chlorhexidine 4% Liquid 1 Application(s) Topical <User Schedule>  cholecalciferol 1000 Unit(s) Oral daily  dextrose 40% Gel 15 Gram(s) Oral once PRN  dextrose 5%. 1000 milliLiter(s) IV Continuous <Continuous>  dextrose 50% Injectable 12.5 Gram(s) IV Push once  dextrose 50% Injectable 25 Gram(s) IV Push once  dextrose 50% Injectable 25 Gram(s) IV Push once  DOBUTamine Infusion 5 MICROgram(s)/kG/Min IV Continuous <Continuous>  famotidine    Tablet 20 milliGRAM(s) Oral two times a day  gabapentin 100 milliGRAM(s) Oral every 8 hours  glucagon  Injectable 1 milliGRAM(s) IntraMuscular once PRN  heparin  Injectable 5000 Unit(s) SubCutaneous every 12 hours  hydrALAZINE 37.5 milliGRAM(s) Oral every 8 hours  insulin lispro (HumaLOG) corrective regimen sliding scale   SubCutaneous three times a day before meals  insulin lispro (HumaLOG) corrective regimen sliding scale   SubCutaneous at bedtime  isosorbide   dinitrate Tablet (ISORDIL) 10 milliGRAM(s) Oral every 8 hours  levothyroxine 150 MICROGram(s) Oral daily  magnesium oxide 400 milliGRAM(s) Oral two times a day with meals  sodium chloride 0.9% lock flush 3 milliLiter(s) IV Push every 8 hours                            10.8   6.72  )-----------( 125      ( 28 May 2018 06:01 )             32.2       Hemoglobin: 10.8 g/dL (05-28 @ 06:01)  Hemoglobin: 11.2 g/dL (05-27 @ 06:23)  Hemoglobin: 11.2 g/dL (05-26 @ 12:30)  Hemoglobin: 11.2 g/dL (05-26 @ 06:16)  Hemoglobin: 12.1 g/dL (05-25 @ 07:20)      05-28    131<L>  |  86<L>  |  17  ----------------------------<  114<H>  3.5   |  34<H>  |  1.11    Ca    6.0<LL>      28 May 2018 06:01  Phos  2.9     05-28  Mg     2.0     05-28    TPro  6.3  /  Alb  2.9<L>  /  TBili  3.5<H>  /  DBili  x   /  AST  42<H>  /  ALT  37<H>  /  AlkPhos  231<H>  05-28    Creatinine Trend: 1.11<--, 1.02<--, 0.93<--, 1.03<--, 1.08<--, 1.07<--    COAGS:           T(C): 36.6 (05-27-18 @ 12:00), Max: 36.6 (05-27-18 @ 12:00)  HR: 92 (05-28-18 @ 06:00) (86 - 100)  BP: 120/66 (05-28-18 @ 06:00) (104/60 - 132/69)  RR: 16 (05-28-18 @ 06:00) (11 - 22)  SpO2: 100% (05-28-18 @ 06:00) (96% - 100%)  Wt(kg): --    I&O's Summary    27 May 2018 07:01  -  28 May 2018 07:00  --------------------------------------------------------  IN: 2185.9 mL / OUT: 6585 mL / NET: -4399.1 mL        HEENT:   Normal oral mucosa, PERRL, EOMI	  Lymphatic: No obvious lymphadenopathy , (+) edema  Cardiovascular: Normal S1 S2, No JVD, 1/6 ROSALINDA murmur, Peripheral pulses palpable 2+ bilaterally  Respiratory: Lungs clear to auscultation, normal effort 	  Gastrointestinal:  Soft, Non-tender, + BS	  Skin: No rashes,  No cyanosis, warm to touch  Musculoskeletal: Normal range of motion, normal strength  Psychiatry:  Appropriate Mood & affect     TELEMETRY: Vpaced	         ASSESSMENT/PLAN: 	62 year old Female with past medical history of hypertension, hyperlipidemia, chronic RBBB, obesity, thyroid CA s/p thyroidectomy, hypocalcemia (managed by OP endo Dr murray), nonischemic cardiomyopathy with an ejection fraction of 11% and  normal coronaries on left heart cardiac catheterization 2014 , s/p Medtronic Cl[place] MRI CRT-ICD placement in June 2017, admitted last month with acute on chronic systolic HF exacerbation, presenting today with SOB, volume overload, acute on chronic systolic HF exacerbation    --Last interrogation noted 99% BIV pacing with normal function, based on EKG her BIVICD is optimized  --needs aggressive diuresis-   -  GI / DVT prophylaxis,   keep K>4, mag >2.0   -- no further EP work up necessary at this time   D/W Dr Hallman

## 2018-05-28 NOTE — PROGRESS NOTE ADULT - SUBJECTIVE AND OBJECTIVE BOX
Patient denies CP, Breathing much better.  Clinically improving  diuresing well    acetaminophen   Tablet. 650 milliGRAM(s) Oral every 6 hours PRN  aspirin enteric coated 81 milliGRAM(s) Oral daily  calcitriol   Capsule 0.25 MICROGram(s) Oral at bedtime  calcium acetate 1334 milliGRAM(s) Oral three times a day with meals  calcium carbonate 500 mG (Tums) Chewable 1 Tablet(s) Chew four times a day  calcium gluconate IVPB 2 Gram(s) IV Intermittent once  chlorhexidine 4% Liquid 1 Application(s) Topical <User Schedule>  cholecalciferol 1000 Unit(s) Oral daily  dextrose 40% Gel 15 Gram(s) Oral once PRN  dextrose 5%. 1000 milliLiter(s) IV Continuous <Continuous>  dextrose 50% Injectable 12.5 Gram(s) IV Push once  dextrose 50% Injectable 25 Gram(s) IV Push once  dextrose 50% Injectable 25 Gram(s) IV Push once  DOBUTamine Infusion 5 MICROgram(s)/kG/Min IV Continuous <Continuous>  famotidine    Tablet 20 milliGRAM(s) Oral two times a day  gabapentin 100 milliGRAM(s) Oral every 8 hours  glucagon  Injectable 1 milliGRAM(s) IntraMuscular once PRN  heparin  Injectable 5000 Unit(s) SubCutaneous every 12 hours  hydrALAZINE 50 milliGRAM(s) Oral three times a day  insulin lispro (HumaLOG) corrective regimen sliding scale   SubCutaneous three times a day before meals  insulin lispro (HumaLOG) corrective regimen sliding scale   SubCutaneous at bedtime  isosorbide   dinitrate Tablet (ISORDIL) 10 milliGRAM(s) Oral every 8 hours  levothyroxine 150 MICROGram(s) Oral daily  magnesium oxide 400 milliGRAM(s) Oral two times a day with meals  sodium chloride 0.9% lock flush 3 milliLiter(s) IV Push every 8 hours                            10.8   6.72  )-----------( 125      ( 28 May 2018 06:01 )             32.2       Hemoglobin: 10.8 g/dL ( @ 06:01)  Hemoglobin: 11.2 g/dL ( @ 06:23)  Hemoglobin: 11.2 g/dL ( @ 12:30)  Hemoglobin: 11.2 g/dL ( @ 06:16)  Hemoglobin: 12.1 g/dL (05-25 @ 07:20)          129<L>  |  85<L>  |  14  ----------------------------<  134<H>  3.3<L>   |  32<H>  |  0.97    Ca    6.6<L>      28 May 2018 13:40  Phos  2.8       Mg     2.0         TPro  6.3  /  Alb  2.9<L>  /  TBili  3.5<H>  /  DBili  x   /  AST  42<H>  /  ALT  37<H>  /  AlkPhos  231<H>      Creatinine Trend: 0.97<--, 1.11<--, 1.02<--, 0.93<--, 1.03<--, 1.08<--    COAGS:           T(C): 36.7 (18 @ 13:00), Max: 36.7 (18 @ 13:00)  HR: 100 (18 @ 14:00) (89 - 100)  BP: 123/87 (18 @ 14:00) (104/60 - 132/69)  RR: 19 (18 @ 14:00) (11 - 22)  SpO2: 100% (18 @ 14:00) (96% - 100%)  Wt(kg): --    I&O's Summary    27 May 2018 07:01  -  28 May 2018 07:00  --------------------------------------------------------  IN: 2185.9 mL / OUT: 6585 mL / NET: -4399.1 mL    28 May 2018 07:  -  28 May 2018 14:58  --------------------------------------------------------  IN: 479 mL / OUT: 745 mL / NET: -266 mL              HEENT:   Normal oral mucosa, PERRL, EOMI	  Lymphatic: No obvious lymphadenopathy , (+) edema decreaseing  Cardiovascular: Normal S1 S2, No JVD, 1/6 ROSALINDA murmur, Peripheral pulses palpable 2+ bilaterally  Respiratory: Lungs clear to auscultation, normal effort 	  Gastrointestinal:  Soft, Non-tender, + BS	  Skin: No rashes,  No cyanosis, warm to touch  Musculoskeletal: Normal range of motion, normal strength  Psychiatry:  Appropriate Mood & affect     TELEMETRY: Vpaced	         ASSESSMENT/PLAN: 	62y Female  with severe NICM s/p MDT AICD, HTN, DM, history of thyroid CA, obesity   Admitted with acute on chronic systolic heart failure.     - cardiac index remains adequate  - cont   - Strict I+O's   - Repelet K+  - hypocalcemia, Endo f/u cont to replete      Augusto Grimes MD, FACC

## 2018-05-28 NOTE — PROGRESS NOTE ADULT - PROBLEM SELECTOR PLAN 3
Continue synthroid. Continues to diurese well with 4.3L output over 24 hrs. Wt. down to 98 kg from 107.3 on 5/26. c/w dobutamine as noted. Hydralazine titrated up to 50 mg TID will consider continuing to increase as BP tolerates. Repleting K and Mg as needed. Check BMPs at least Q6hr. Per HF recs patient will need eventual evaluation for LVAD/transplant.

## 2018-05-28 NOTE — PROGRESS NOTE ADULT - SUBJECTIVE AND OBJECTIVE BOX
[facility]  Wale Tovar PA-C  [address] [place] [address]  [phone]      INTERVAL HPI/OVERNIGHT EVENTS:    no new events  lfts improving       MEDICATIONS  (STANDING):  aspirin enteric coated 81 milliGRAM(s) Oral daily  buMETAnide Infusion 1 mG/Hr (10 mL/Hr) IV Continuous <Continuous>  calcitriol   Capsule 0.25 MICROGram(s) Oral at bedtime  calcium acetate 1334 milliGRAM(s) Oral three times a day with meals  calcium carbonate 500 mG (Tums) Chewable 1 Tablet(s) Chew two times a day  chlorhexidine 4% Liquid 1 Application(s) Topical <User Schedule>  cholecalciferol 1000 Unit(s) Oral daily  dextrose 5%. 1000 milliLiter(s) (50 mL/Hr) IV Continuous <Continuous>  dextrose 50% Injectable 12.5 Gram(s) IV Push once  dextrose 50% Injectable 25 Gram(s) IV Push once  dextrose 50% Injectable 25 Gram(s) IV Push once  DOBUTamine Infusion 5 MICROgram(s)/kG/Min (17.055 mL/Hr) IV Continuous <Continuous>  famotidine    Tablet 20 milliGRAM(s) Oral two times a day  heparin  Injectable 5000 Unit(s) SubCutaneous every 12 hours  hydrALAZINE 25 milliGRAM(s) Oral every 8 hours  insulin lispro (HumaLOG) corrective regimen sliding scale   SubCutaneous three times a day before meals  insulin lispro (HumaLOG) corrective regimen sliding scale   SubCutaneous at bedtime  levothyroxine 150 MICROGram(s) Oral daily  magnesium oxide 400 milliGRAM(s) Oral two times a day with meals  sodium chloride 0.9% lock flush 3 milliLiter(s) IV Push every 8 hours    MEDICATIONS  (PRN):  dextrose 40% Gel 15 Gram(s) Oral once PRN Blood Glucose LESS THAN 70 milliGRAM(s)/deciliter  glucagon  Injectable 1 milliGRAM(s) IntraMuscular once PRN Glucose LESS THAN 70 milligrams/deciliter      Allergies    penicillin (Rash)    Intolerances        ROS:   General:  No wt loss, fevers, chills, night sweats, fatigue,   Eyes:  Good vision, no reported pain  ENT:  No sore throat, pain, runny nose, dysphagia  CV:  No pain, palpitations, hypo/hypertension  Resp:  No dyspnea, cough, tachypnea, wheezing  GI:  No pain, No nausea, No vomiting, No diarrhea, No constipation, No weight loss, No fever, No pruritis, No rectal bleeding, No tarry stools, No dysphagia,  :  No pain, bleeding, incontinence, nocturia  Muscle:  No pain, weakness  Neuro:  No weakness, tingling, memory problems  Psych:  No fatigue, insomnia, mood problems, depression  Endocrine:  No polyuria, polydipsia, cold/heat intolerance  Heme:  No petechiae, ecchymosis, easy bruisability  Skin:  No rash, tattoos, scars, edema      PHYSICAL EXAM:   Vital Signs:  Vital Signs Last 24 Hrs  T(C): 36.4 (26 May 2018 08:00), Max: 36.4 (26 May 2018 08:00)  T(F): 97.5 (26 May 2018 08:00), Max: 97.5 (26 May 2018 08:00)  HR: 88 (26 May 2018 11:00) (64 - 91)  BP: 124/83 (26 May 2018 11:00) (89/64 - 131/85)  BP(mean): 91 (26 May 2018 11:00) (70 - 91)  RR: 11 (26 May 2018 11:00) (9 - 22)  SpO2: 100% (26 May 2018 11:00) (99% - 100%)  Daily Height in cm: 170.18 (25 May 2018 15:00)    Daily Weight in k.3 (26 May 2018 06:00)    GENERAL:  Appears stated age, well-groomed, well-nourished, no distress  HEENT:  NC/AT,  conjunctivae clear and pink, no thyromegaly, nodules, adenopathy, no JVD, sclera -anicteric  CHEST:  Full & symmetric excursion, no increased effort, breath sounds clear  HEART:  Regular rhythm, S1, S2, no murmur/rub/S3/S4, no abdominal bruit, no edema  ABDOMEN:  Soft, non-tender, non-distended, normoactive bowel sounds,  no masses ,no hepato-splenomegaly, no signs of chronic liver disease  EXTEREMITIES:  no cyanosis,clubbing or edema  SKIN:  No rash/erythema/ecchymoses/petechiae/wounds/abscess/warm/dry  NEURO:  Alert, oriented, no asterixis, no tremor, no encephalopathy      LABS:                        11.2   7.33  )-----------( 155      ( 26 May 2018 06:16 )             33.3     05-    132<L>  |  84<L>  |  26<H>  ----------------------------<  139<H>  4.0   |  33<H>  |  1.22    Ca    6.7<L>      26 May 2018 06:16  Phos  5.4       Mg     2.0         TPro  6.3  /  Alb  2.9<L>  /  TBili  3.8<H>  /  DBili  x   /  AST  48<H>  /  ALT  48<H>  /  AlkPhos  266<H>        Urinalysis Basic - ( 25 May 2018 15:43 )    Color: PLYEL / Appearance: CLEAR / S.006 / pH: 7.0  Gluc: NEGATIVE / Ketone: NEGATIVE  / Bili: NEGATIVE / Urobili: NORMAL mg/dL   Blood: SMALL / Protein: NEGATIVE mg/dL / Nitrite: NEGATIVE   Leuk Esterase: NEGATIVE / RBC: 0-2 / WBC 0-2   Sq Epi: x / Non Sq Epi: x / Bacteria: x        RADIOLOGY & ADDITIONAL TESTS:

## 2018-05-29 LAB
ALBUMIN SERPL ELPH-MCNC: 2.8 G/DL — LOW (ref 3.3–5)
ALP SERPL-CCNC: 224 U/L — HIGH (ref 40–120)
ALT FLD-CCNC: 38 U/L — HIGH (ref 4–33)
AST SERPL-CCNC: 40 U/L — HIGH (ref 4–32)
BASE EXCESS BLDA CALC-SCNC: 9.9 MMOL/L — SIGNIFICANT CHANGE UP
BASE EXCESS BLDV CALC-SCNC: 11.7 MMOL/L — SIGNIFICANT CHANGE UP
BASE EXCESS BLDV CALC-SCNC: 7.8 MMOL/L — SIGNIFICANT CHANGE UP
BASE EXCESS BLDV CALC-SCNC: 9.3 MMOL/L — SIGNIFICANT CHANGE UP
BILIRUB SERPL-MCNC: 3.6 MG/DL — HIGH (ref 0.2–1.2)
BUN SERPL-MCNC: 15 MG/DL — SIGNIFICANT CHANGE UP (ref 7–23)
CA-I BLD-SCNC: 0.89 MMOL/L — LOW (ref 1.03–1.23)
CALCIUM SERPL-MCNC: 7.2 MG/DL — LOW (ref 8.4–10.5)
CHLORIDE SERPL-SCNC: 90 MMOL/L — LOW (ref 98–107)
CO2 SERPL-SCNC: 32 MMOL/L — HIGH (ref 22–31)
CREAT SERPL-MCNC: 0.89 MG/DL — SIGNIFICANT CHANGE UP (ref 0.5–1.3)
GAS PNL BLDV: 129 MMOL/L — LOW (ref 136–146)
GAS PNL BLDV: 130 MMOL/L — LOW (ref 136–146)
GLUCOSE BLDV-MCNC: 191 — HIGH (ref 70–99)
GLUCOSE BLDV-MCNC: 192 — HIGH (ref 70–99)
GLUCOSE SERPL-MCNC: 144 MG/DL — HIGH (ref 70–99)
HCO3 BLDA-SCNC: 34 MMOL/L — HIGH (ref 22–26)
HCO3 BLDV-SCNC: 31 MMOL/L — HIGH (ref 20–27)
HCO3 BLDV-SCNC: 32 MMOL/L — HIGH (ref 20–27)
HCO3 BLDV-SCNC: 34 MMOL/L — HIGH (ref 20–27)
HCT VFR BLD CALC: 34.3 % — LOW (ref 34.5–45)
HCT VFR BLDV CALC: 33.9 % — LOW (ref 34.5–45)
HCT VFR BLDV CALC: 37.9 % — SIGNIFICANT CHANGE UP (ref 34.5–45)
HGB BLD-MCNC: 11.2 G/DL — LOW (ref 11.5–15.5)
HGB BLDV-MCNC: 11 G/DL — LOW (ref 11.5–15.5)
HGB BLDV-MCNC: 12.3 G/DL — SIGNIFICANT CHANGE UP (ref 11.5–15.5)
LACTATE BLDA-SCNC: 1 MMOL/L — SIGNIFICANT CHANGE UP (ref 0.5–2)
MAGNESIUM SERPL-MCNC: 2.3 MG/DL — SIGNIFICANT CHANGE UP (ref 1.6–2.6)
MCHC RBC-ENTMCNC: 23.7 PG — LOW (ref 27–34)
MCHC RBC-ENTMCNC: 32.7 % — SIGNIFICANT CHANGE UP (ref 32–36)
MCV RBC AUTO: 72.5 FL — LOW (ref 80–100)
NRBC # FLD: 0 — SIGNIFICANT CHANGE UP
PCO2 BLDA: 40 MMHG — SIGNIFICANT CHANGE UP (ref 32–48)
PCO2 BLDV: 44 MMHG — SIGNIFICANT CHANGE UP (ref 41–51)
PCO2 BLDV: 46 MMHG — SIGNIFICANT CHANGE UP (ref 41–51)
PCO2 BLDV: 51 MMHG — SIGNIFICANT CHANGE UP (ref 41–51)
PH BLDA: 7.53 PH — HIGH (ref 7.35–7.45)
PH BLDV: 7.46 PH — HIGH (ref 7.32–7.43)
PH BLDV: 7.48 PH — HIGH (ref 7.32–7.43)
PH BLDV: 7.48 PH — HIGH (ref 7.32–7.43)
PHOSPHATE SERPL-MCNC: 3.4 MG/DL — SIGNIFICANT CHANGE UP (ref 2.5–4.5)
PLATELET # BLD AUTO: 133 K/UL — LOW (ref 150–400)
PMV BLD: SIGNIFICANT CHANGE UP FL (ref 7–13)
PO2 BLDA: 90 MMHG — SIGNIFICANT CHANGE UP (ref 83–108)
PO2 BLDV: 33 MMHG — LOW (ref 35–40)
PO2 BLDV: 34 MMHG — LOW (ref 35–40)
PO2 BLDV: 35 MMHG — SIGNIFICANT CHANGE UP (ref 35–40)
POTASSIUM BLDV-SCNC: 3.5 MMOL/L — SIGNIFICANT CHANGE UP (ref 3.4–4.5)
POTASSIUM BLDV-SCNC: 3.9 MMOL/L — SIGNIFICANT CHANGE UP (ref 3.4–4.5)
POTASSIUM SERPL-MCNC: 4.1 MMOL/L — SIGNIFICANT CHANGE UP (ref 3.5–5.3)
POTASSIUM SERPL-SCNC: 4.1 MMOL/L — SIGNIFICANT CHANGE UP (ref 3.5–5.3)
PROT SERPL-MCNC: 6.3 G/DL — SIGNIFICANT CHANGE UP (ref 6–8.3)
RBC # BLD: 4.73 M/UL — SIGNIFICANT CHANGE UP (ref 3.8–5.2)
RBC # FLD: 23.2 % — HIGH (ref 10.3–14.5)
SAO2 % BLDA: 99.2 % — HIGH (ref 95–99)
SAO2 % BLDV: 62.6 % — SIGNIFICANT CHANGE UP (ref 60–85)
SAO2 % BLDV: 66.1 % — SIGNIFICANT CHANGE UP (ref 60–85)
SAO2 % BLDV: 68.7 % — SIGNIFICANT CHANGE UP (ref 60–85)
SODIUM SERPL-SCNC: 132 MMOL/L — LOW (ref 135–145)
WBC # BLD: 6.39 K/UL — SIGNIFICANT CHANGE UP (ref 3.8–10.5)
WBC # FLD AUTO: 6.39 K/UL — SIGNIFICANT CHANGE UP (ref 3.8–10.5)

## 2018-05-29 PROCEDURE — 99233 SBSQ HOSP IP/OBS HIGH 50: CPT

## 2018-05-29 PROCEDURE — 71045 X-RAY EXAM CHEST 1 VIEW: CPT | Mod: 26

## 2018-05-29 RX ORDER — VALSARTAN 80 MG/1
40 TABLET ORAL
Qty: 0 | Refills: 0 | Status: DISCONTINUED | OUTPATIENT
Start: 2018-05-29 | End: 2018-05-30

## 2018-05-29 RX ORDER — CALCIUM ACETATE 667 MG
667 TABLET ORAL
Qty: 0 | Refills: 0 | Status: DISCONTINUED | OUTPATIENT
Start: 2018-05-29 | End: 2018-05-30

## 2018-05-29 RX ORDER — CALCIUM GLUCONATE 100 MG/ML
2 VIAL (ML) INTRAVENOUS ONCE
Qty: 0 | Refills: 0 | Status: COMPLETED | OUTPATIENT
Start: 2018-05-29 | End: 2018-05-29

## 2018-05-29 RX ORDER — LEVOTHYROXINE SODIUM 125 MCG
175 TABLET ORAL DAILY
Qty: 0 | Refills: 0 | Status: DISCONTINUED | OUTPATIENT
Start: 2018-05-29 | End: 2018-06-01

## 2018-05-29 RX ORDER — BUMETANIDE 0.25 MG/ML
1 INJECTION INTRAMUSCULAR; INTRAVENOUS ONCE
Qty: 0 | Refills: 0 | Status: COMPLETED | OUTPATIENT
Start: 2018-05-29 | End: 2018-05-29

## 2018-05-29 RX ADMIN — HEPARIN SODIUM 5000 UNIT(S): 5000 INJECTION INTRAVENOUS; SUBCUTANEOUS at 18:18

## 2018-05-29 RX ADMIN — CALCITRIOL 0.25 MICROGRAM(S): 0.5 CAPSULE ORAL at 22:21

## 2018-05-29 RX ADMIN — Medication 2: at 12:44

## 2018-05-29 RX ADMIN — Medication 2: at 09:00

## 2018-05-29 RX ADMIN — BUMETANIDE 1 MILLIGRAM(S): 0.25 INJECTION INTRAMUSCULAR; INTRAVENOUS at 16:57

## 2018-05-29 RX ADMIN — Medication 50 MILLIGRAM(S): at 05:33

## 2018-05-29 RX ADMIN — Medication 200 GRAM(S): at 05:45

## 2018-05-29 RX ADMIN — FAMOTIDINE 20 MILLIGRAM(S): 10 INJECTION INTRAVENOUS at 05:33

## 2018-05-29 RX ADMIN — Medication 1 TABLET(S): at 05:32

## 2018-05-29 RX ADMIN — Medication 150 MICROGRAM(S): at 05:33

## 2018-05-29 RX ADMIN — GABAPENTIN 100 MILLIGRAM(S): 400 CAPSULE ORAL at 22:21

## 2018-05-29 RX ADMIN — Medication 1 TABLET(S): at 12:44

## 2018-05-29 RX ADMIN — MAGNESIUM OXIDE 400 MG ORAL TABLET 400 MILLIGRAM(S): 241.3 TABLET ORAL at 09:00

## 2018-05-29 RX ADMIN — ISOSORBIDE DINITRATE 10 MILLIGRAM(S): 5 TABLET ORAL at 14:28

## 2018-05-29 RX ADMIN — GABAPENTIN 100 MILLIGRAM(S): 400 CAPSULE ORAL at 14:28

## 2018-05-29 RX ADMIN — Medication 50 MILLIGRAM(S): at 22:22

## 2018-05-29 RX ADMIN — Medication 50 MILLIGRAM(S): at 14:28

## 2018-05-29 RX ADMIN — Medication 650 MILLIGRAM(S): at 07:23

## 2018-05-29 RX ADMIN — Medication 1000 UNIT(S): at 12:43

## 2018-05-29 RX ADMIN — CHLORHEXIDINE GLUCONATE 1 APPLICATION(S): 213 SOLUTION TOPICAL at 12:44

## 2018-05-29 RX ADMIN — Medication 650 MILLIGRAM(S): at 08:23

## 2018-05-29 RX ADMIN — Medication 1334 MILLIGRAM(S): at 09:00

## 2018-05-29 RX ADMIN — SODIUM CHLORIDE 3 MILLILITER(S): 9 INJECTION INTRAMUSCULAR; INTRAVENOUS; SUBCUTANEOUS at 05:33

## 2018-05-29 RX ADMIN — Medication 81 MILLIGRAM(S): at 12:44

## 2018-05-29 RX ADMIN — SODIUM CHLORIDE 3 MILLILITER(S): 9 INJECTION INTRAMUSCULAR; INTRAVENOUS; SUBCUTANEOUS at 13:44

## 2018-05-29 RX ADMIN — SODIUM CHLORIDE 3 MILLILITER(S): 9 INJECTION INTRAMUSCULAR; INTRAVENOUS; SUBCUTANEOUS at 22:06

## 2018-05-29 RX ADMIN — Medication 667 MILLIGRAM(S): at 18:18

## 2018-05-29 RX ADMIN — ISOSORBIDE DINITRATE 10 MILLIGRAM(S): 5 TABLET ORAL at 05:32

## 2018-05-29 RX ADMIN — Medication 1 TABLET(S): at 18:18

## 2018-05-29 RX ADMIN — HEPARIN SODIUM 5000 UNIT(S): 5000 INJECTION INTRAVENOUS; SUBCUTANEOUS at 05:32

## 2018-05-29 RX ADMIN — Medication 2: at 18:18

## 2018-05-29 RX ADMIN — Medication 1334 MILLIGRAM(S): at 12:43

## 2018-05-29 RX ADMIN — Medication 8.53 MICROGRAM(S)/KG/MIN: at 10:43

## 2018-05-29 RX ADMIN — GABAPENTIN 100 MILLIGRAM(S): 400 CAPSULE ORAL at 05:35

## 2018-05-29 RX ADMIN — FAMOTIDINE 20 MILLIGRAM(S): 10 INJECTION INTRAVENOUS at 18:18

## 2018-05-29 RX ADMIN — MAGNESIUM OXIDE 400 MG ORAL TABLET 400 MILLIGRAM(S): 241.3 TABLET ORAL at 22:38

## 2018-05-29 RX ADMIN — Medication 1 TABLET(S): at 23:06

## 2018-05-29 NOTE — PROGRESS NOTE ADULT - SUBJECTIVE AND OBJECTIVE BOX
Subjective: No chest pain or sob     acetaminophen   Tablet. 650 milliGRAM(s) Oral every 6 hours PRN  aspirin enteric coated 81 milliGRAM(s) Oral daily  calcitriol   Capsule 0.25 MICROGram(s) Oral at bedtime  calcium acetate 1334 milliGRAM(s) Oral three times a day with meals  calcium carbonate 500 mG (Tums) Chewable 1 Tablet(s) Chew four times a day  chlorhexidine 4% Liquid 1 Application(s) Topical <User Schedule>  cholecalciferol 1000 Unit(s) Oral daily  dextrose 40% Gel 15 Gram(s) Oral once PRN  dextrose 5%. 1000 milliLiter(s) IV Continuous <Continuous>  dextrose 50% Injectable 12.5 Gram(s) IV Push once  dextrose 50% Injectable 25 Gram(s) IV Push once  dextrose 50% Injectable 25 Gram(s) IV Push once  DOBUTamine Infusion 2.5 MICROgram(s)/kG/Min IV Continuous <Continuous>  famotidine    Tablet 20 milliGRAM(s) Oral two times a day  gabapentin 100 milliGRAM(s) Oral every 8 hours  glucagon  Injectable 1 milliGRAM(s) IntraMuscular once PRN  heparin  Injectable 5000 Unit(s) SubCutaneous every 12 hours  hydrALAZINE 50 milliGRAM(s) Oral three times a day  insulin lispro (HumaLOG) corrective regimen sliding scale   SubCutaneous three times a day before meals  insulin lispro (HumaLOG) corrective regimen sliding scale   SubCutaneous at bedtime  isosorbide   dinitrate Tablet (ISORDIL) 10 milliGRAM(s) Oral every 8 hours  levothyroxine 175 MICROGram(s) Oral daily  magnesium oxide 400 milliGRAM(s) Oral two times a day with meals  sodium chloride 0.9% lock flush 3 milliLiter(s) IV Push every 8 hours                            11.2   6.39  )-----------( 133      ( 29 May 2018 04:45 )             34.3       05-29    132<L>  |  90<L>  |  15  ----------------------------<  144<H>  4.1   |  32<H>  |  0.89    Ca    7.2<L>      29 May 2018 04:45  Phos  3.4     -  Mg     2.3         TPro  6.3  /  Alb  2.8<L>  /  TBili  3.6<H>  /  DBili  x   /  AST  40<H>  /  ALT  38<H>  /  AlkPhos  224<H>              T(C): 36.7 (18 @ 08:39), Max: 36.7 (18 @ 19:00)  HR: 99 (18 @ 14:00) (89 - 110)  BP: 120/93 (18 @ 14:00) (103/65 - 129/104)  RR: 25 (18 @ 14:00) (10 - 25)  SpO2: 100% (18 @ 14:00) (96% - 100%)  Wt(kg): --    I&O's Summary    28 May 2018 07:  -  29 May 2018 07:00  --------------------------------------------------------  IN: 1819.3 mL / OUT: 2825 mL / NET: -1005.7 mL    29 May 2018 07:  -  29 May 2018 15:09  --------------------------------------------------------  IN: 385 mL / OUT: 760 mL / NET: -375 mL      Heart: normal S1, S2, RRR, no m/r/g  Lungs: cta bilaterally  Abd: soft nT, nD  Ext: no edema     TELEMETRY: 	    ECG:  	  RADIOLOGY:   < from: Xray Chest 1 View- PORTABLE-Urgent (18 @ 10:32) >  INTERPRETATION:     May 26 at 7:15 AM:  [place] catheter seen with its tip in the right interlobar artery   slightly advanced since the last study.    Lungs are clear, heart is enlarged and no effusion or pneumothorax.    9:39 AM:  Tip of the [place] catheter has been pulled back so it is now in the   right main pulmonary artery segment. Left-sided AICD unchanged, stable   cardiomegaly and clear lungs.      COMPARISON:  May 25      IMPRESSION:  Follow-up studies post [place]-Rad[place] catheter placement and     < end of copied text >    DIAGNOSTIC TESTING:  [ ] Echocardiogram: < from: TTE with Doppler (w/Cont) (18 @ 20:04) >  CONCLUSIONS:  1. Mitral annular calcification, otherwise normal mitral  valve. Moderate mitral regurgitation.  2. Moderately dilated left atrium.  LA volume index = 47  cc/m2.  3. Mild left ventricular enlargement.  4. Severe global left ventricular systolic dysfunction.  Endocardial visualization enhanced with intravenous  injection of echo contrast (Definity).  No LV thrombus  seen.  5. Right ventricular enlargement with decreased right  ventricular systolic function.  A device wire is noted in  the right heart.  6. Estimated right ventricular systolic pressure equals 42  mm Hg, assuming right atrial pressure equals 10 mm Hg,  consistent with mild pulmonary hypertension.  7.Normal tricuspid valve.  Severe tricuspid regurgitation.  *** Compared with echocardiogram of 2017, left  ventricular systolic function has deteriorated.    < end of copied text >    [ ]  Catheterization:  < from: Cardiac Cath Lab (14 @ 16:49) >  CORONARY VESSELS: The coronary circulation is co-dominant.  LM:   -- LM: Normal.  LAD:   --  LAD: Normal.  CX:   --  Circumflex: Normal.  RCA:   --  RCA: Normal.  COMPLICATIONS: There were no complications.    < end of copied text >    [ ] Stress Test:    OTHER: 	      ASSESSMENT/PLAN: 	62y Female with severe NICM s/p AICD, HTN, DM admitted with acute on chronic systolic heart failure.     -Pt. volume status improved  -now off bumex gtt and dobutamine decreased to 2.5  -monitor CI and PCWP on decreased   -pt. with known NICM - no need for urgent ischemic eval at this time  -follow up heart failure  -continue with supportive care per CCU    Kunal Muller MD

## 2018-05-29 NOTE — CONSULT NOTE ADULT - PROVIDER SPECIALTY LIST ADULT
Electrophysiology
Gastroenterology
Heart Failure
Intervent Cardiology
Internal Medicine
Endocrinology

## 2018-05-29 NOTE — CONSULT NOTE ADULT - CONSULT REQUESTED DATE/TIME
23-May-2018 17:57
23-May-2018 18:01
25-May-2018 12:37
25-May-2018 16:46
23-May-2018 21:23
29-May-2018 19:44

## 2018-05-29 NOTE — CONSULT NOTE ADULT - SUBJECTIVE AND OBJECTIVE BOX
HPI:  62F with a past medical history of hypertension, hyperlipidemia, chronic RBBB,  obesity, thyroid CA, nonischemic cardiomyopathy with an ejection fraction of 11% and normal coronaries on recent cardiac catheterization at [facility] with Dr Chavarria , s/p Medtronic Sofi MRI CRT-ICD placement in June 2017, admitted last month with acute on chronic systolic HF exacerbation, presenting today with SOB, volume overload, acute on chronic systolic HF exacerbation.  She reports compliance with diet and meds, abdominal bloating, EDDY after a few steps, orthopnea cough with yellow phlegm, substernal chest pain associated with cough.  Denies nausea, vomit chills, diaphoresis,   She has seen her OP Cardiologist once since DC.  In the Ed, the pt was given Lasix 40 mg IV x 1 with positive urine output. (23 May 2018 19:57)  Patient has history of hypothyroidism s/p thyroidectomy for thyroid cancer, on synthroid 150 mcg daily, says she is compliant with synthroid intake, she FU with her Endo regularly.     PAST MEDICAL & SURGICAL HISTORY:  Asthma  CHF (congestive heart failure): hypothyroid  DM (diabetes mellitus)  HTN (hypertension)  Thyroid ca  S/P thyroidectomy      FAMILY HISTORY:  No pertinent family history in first degree relatives      Social History:    Outpatient Medications:    MEDICATIONS  (STANDING):  aspirin enteric coated 81 milliGRAM(s) Oral daily  calcitriol   Capsule 0.25 MICROGram(s) Oral at bedtime  calcium acetate 667 milliGRAM(s) Oral three times a day with meals  calcium carbonate 500 mG (Tums) Chewable 1 Tablet(s) Chew four times a day  chlorhexidine 4% Liquid 1 Application(s) Topical <User Schedule>  cholecalciferol 1000 Unit(s) Oral daily  dextrose 5%. 1000 milliLiter(s) (50 mL/Hr) IV Continuous <Continuous>  dextrose 50% Injectable 12.5 Gram(s) IV Push once  dextrose 50% Injectable 25 Gram(s) IV Push once  dextrose 50% Injectable 25 Gram(s) IV Push once  DOBUTamine Infusion 1 MICROgram(s)/kG/Min (3.411 mL/Hr) IV Continuous <Continuous>  famotidine    Tablet 20 milliGRAM(s) Oral two times a day  gabapentin 100 milliGRAM(s) Oral every 8 hours  heparin  Injectable 5000 Unit(s) SubCutaneous every 12 hours  hydrALAZINE 50 milliGRAM(s) Oral three times a day  insulin lispro (HumaLOG) corrective regimen sliding scale   SubCutaneous three times a day before meals  insulin lispro (HumaLOG) corrective regimen sliding scale   SubCutaneous at bedtime  levothyroxine 175 MICROGram(s) Oral daily  magnesium oxide 400 milliGRAM(s) Oral two times a day with meals  sodium chloride 0.9% lock flush 3 milliLiter(s) IV Push every 8 hours  valsartan 40 milliGRAM(s) Oral two times a day    MEDICATIONS  (PRN):  acetaminophen   Tablet. 650 milliGRAM(s) Oral every 6 hours PRN Moderate Pain (4 - 6)  dextrose 40% Gel 15 Gram(s) Oral once PRN Blood Glucose LESS THAN 70 milliGRAM(s)/deciliter  glucagon  Injectable 1 milliGRAM(s) IntraMuscular once PRN Glucose LESS THAN 70 milligrams/deciliter      Allergies    Isordil (Headache)  penicillin (Rash)    Intolerances      Review of Systems:  Constitutional: No fever, no chills  Eyes: No blurry vision  Neuro: No tremors  HEENT: No pain, no neck swelling  Cardiovascular: No chest pain, no palpitations  Respiratory: Has SOB, no cough  GI: No nausea, vomiting, abdominal pain  : No dysuria  Skin: no rash  MSK: Has leg swelling.  Psych: no depression  Endocrine: no polyuria, polydipsia    ALL OTHER SYSTEMS REVIEWED AND NEGATIVE    UNABLE TO OBTAIN    PHYSICAL EXAM:  VITALS: T(C): 36.7 (05-29-18 @ 16:00)  T(F): 98.1 (05-29-18 @ 16:00), Max: 98.1 (05-29-18 @ 00:00)  HR: 90 (05-29-18 @ 18:00) (89 - 110)  BP: 118/86 (05-29-18 @ 18:00) (103/65 - 125/76)  RR:  (10 - 30)  SpO2:  (98% - 100%)  Wt(kg): --  GENERAL: NAD, well-groomed, well-developed  EYES: No proptosis, no lid lag  HEENT:  Atraumatic, Normocephalic  THYROID: Normal size, no palpable nodules  RESPIRATORY: Clear to auscultation bilaterally; No rales, rhonchi, wheezing  CARDIOVASCULAR: Si S2, No murmurs;  GI: Soft, non distended, normal bowel sounds  SKIN: Dry, intact, No rashes or lesions  MUSCULOSKELETAL: Has BL lower extremity edema.  NEURO:  no tremor, sensation decreased in feet BL,    POCT Blood Glucose.: 154 mg/dL (05-29-18 @ 18:07)  POCT Blood Glucose.: 182 mg/dL (05-29-18 @ 12:25)  POCT Blood Glucose.: 154 mg/dL (05-29-18 @ 08:31)  POCT Blood Glucose.: 191 mg/dL (05-28-18 @ 22:40)  POCT Blood Glucose.: 145 mg/dL (05-28-18 @ 18:16)  POCT Blood Glucose.: 176 mg/dL (05-28-18 @ 12:51)  POCT Blood Glucose.: 93 mg/dL (05-28-18 @ 08:52)  POCT Blood Glucose.: 212 mg/dL (05-27-18 @ 22:15)  POCT Blood Glucose.: 124 mg/dL (05-27-18 @ 17:28)  POCT Blood Glucose.: 197 mg/dL (05-27-18 @ 12:28)  POCT Blood Glucose.: 116 mg/dL (05-27-18 @ 08:23)  POCT Blood Glucose.: 139 mg/dL (05-26-18 @ 22:23)                            11.2   6.39  )-----------( 133      ( 29 May 2018 04:45 )             34.3       05-29    132<L>  |  90<L>  |  15  ----------------------------<  144<H>  4.1   |  32<H>  |  0.89    EGFR if : 81  EGFR if non : 70    Ca    7.2<L>      05-29  Mg     2.3     05-29  Phos  3.4     05-29    TPro  6.3  /  Alb  2.8<L>  /  TBili  3.6<H>  /  DBili  x   /  AST  40<H>  /  ALT  38<H>  /  AlkPhos  224<H>  05-29      Thyroid Function Tests:      Hemoglobin A1C, Whole Blood: 6.8 % <H> [4.0 - 5.6] (04-19-18 @ 05:45)      05-24 Chol 99<L> LDL 64 HDL 37<L> Trig 38    Radiology:

## 2018-05-29 NOTE — PROGRESS NOTE ADULT - PROBLEM SELECTOR PLAN 1
- Continues on dobutamine gtt @ 5 mcg. CVP 16 mm Hg with this am JACKSON calculation co 5.9 L/min, CI 2.6 L/min/sqm, and  dynes sec cm(-5) . - Dobutamine decreased at 10 AM to 2.5 mg/hr.  - JCAKSON CO= 6.25; CI= 2.99. CVP 7, calculated .  - Appreciate HF recs.

## 2018-05-29 NOTE — PROGRESS NOTE ADULT - ASSESSMENT
62yFemale with severe NICM s/p MDT AICD, HTN, DM, history of thyroid CA, obesity admitted with acute on chronic systolic heart failure.      Problem/Plan - 1:  ·  Problem: Acute on chronic systolic congestive heart failure with severe Cardiomyopathy S/P AICD  .  Plan: S/P RHC. Diuresed very well . Holding Bumex to avoid over diuresis . Up titrating Hydralazine . HF team following. .       Problem/Plan - 2:  ·  Problem: Cardiogenic Shock .  Plan: S/P RHC. On Dobutamine.     Problem/Plan - 3:  ·  Problem: Essential hypertension.  Plan: BP readings fine. Low salt diet.  Continue BP meds.      Problem/Plan - 4:  ·  Problem: Diabetes mellitus, type 2.  Plan: Sugars in acceptable range .   FS QID  HISS   DM diet.      Problem/Plan - 5:  ·  Problem: Hypocalcemia .  Plan :Replacing. Renal helping. Will need IV replacement also.      Problem/Plan -6:  ·  Problem: Abnormal LFT  .  Plan : Secondary to Hepatic congestion from CHF . GI helping.      Problem/Plan -7:  ·  Problem: MAGNUS  .  Plan : Resolved. Renal helping.       Problem/Plan -8:  Problem: Need for prophylactic measure. Plan: VTE with Heparin 5000U sub cut BID.  Fall, Aspirations and safety precautions,

## 2018-05-29 NOTE — PROGRESS NOTE ADULT - ASSESSMENT
Ms. Carson is a 62 year old woman with acute on chronic systolic heart failure due to a nonischemic cardiomyopathy with a severely dilated left ventricle and severely decreased LV ejection fraction currently in cardiogenic shock requiring IV inotropic support. Her abdominal pain is likely reflective of her low cardiac output. She is urinating well on the current support. She has had multiple hospitalizations and is at high risk for further decline and death. Dr. Mckeon offered transfer to [facility] for evaluation for advanced therapies, but the family would like to wait to be transferred. She has responded well to dobutamine and bumex gtt.

## 2018-05-29 NOTE — PROGRESS NOTE ADULT - PROBLEM SELECTOR PLAN 4
Continue synthroid. Continue synthroid.    ppx: HSQ    Dispo: wean dobutamine per HF recommendations.     Tim Genao MD  [facility] Internal Medicine   Pager # [phone]/ [phone]

## 2018-05-29 NOTE — PROGRESS NOTE ADULT - PROBLEM SELECTOR PLAN 2
- Remains hypocalcemic, continue replacement per renal. Check ionized calcium.  - On further history patient has had hypocalcemia chronically that started after thyroidectomy in 2000. Suspect primary hypoparathyroidism.   - Repeat Vit D25 wnl, PTH borderline low normal.  - Patient is asymptomatic, QTc unreliable in the setting of pacemaker. Would recommend 2 g calcium carbonate IV and consider slow infusion.  - Appreciate HF opinion on electrolyte induced cardiomyopathy?  - Appreciate renal input.   - Consult endocrine. - Endocrinology consult Dr. Alva today. Ca improved. Hypocalcemia in the setting of thyroidectomy for thyroid CA and resultant primary hypoparathyroidism. (procedure in 2000)  - Repeat Vit D25 wnl, PTH borderline low normal.  - Patient is asymptomatic, QTc unreliable in the setting of pacemaker. Aggressive repletion to corrected total serum Ca 7.5 in the setting of loop diuresis.  - Appreciate HF opinion on electrolyte induced cardiomyopathy?  - Appreciate renal input.   - Consult endocrine this AM.

## 2018-05-29 NOTE — PROGRESS NOTE ADULT - SUBJECTIVE AND OBJECTIVE BOX
HPI/INTERVAL HISTORY:  Patient seen and examined at bedside.    OBJECTIVE:  VITAL SIGNS:  ICU Vital Signs Last 24 Hrs  T(C): 36.5 (29 May 2018 04:00), Max: 36.7 (28 May 2018 13:00)  T(F): 97.7 (29 May 2018 04:00), Max: 98.1 (28 May 2018 19:00)  HR: 97 (29 May 2018 06:00) (89 - 110)  BP: 116/77 (29 May 2018 06:00) (113/71 - 130/79)  BP(mean): 86 (29 May 2018 06:00) (77 - 110)  ABP: --  ABP(mean): --  RR: 16 (29 May 2018 06:00) (10 - 25)  SpO2: 100% (29 May 2018 06:00) (96% - 100%)        05-28 @ 07:01  -  05-29 @ 07:00  --------------------------------------------------------  IN: 1819.3 mL / OUT: 2825 mL / NET: -1005.7 mL      CAPILLARY BLOOD GLUCOSE      POCT Blood Glucose.: 191 mg/dL (28 May 2018 22:40)    Gen: NAD, pleasant, conversant  HEENT: NC/AT; PERRL, anicteric sclera  Neck: supple, JVD estimated 11 cm.   Resp: Mild left > right basilar crackles. Otherwise CTA.  Cardiovasc: S1S2 normal; RRR; no murmurs, rubs or gallops  GI: soft, nondistended, nontender; +BS  Extr: 2+ PPE to the proximal anterior tibia b/l. palpable DP pulses present b/l.  Skin: Changes 2/2 PVD b/l LE.  Neuro: Non-foca    LABS:                        11.2   6.39  )-----------( 133      ( 29 May 2018 04:45 )             34.3     05-29    132<L>  |  90<L>  |  15  ----------------------------<  144<H>  4.1   |  32<H>  |  0.89    Ca    7.2<L>      29 May 2018 04:45  Phos  3.4     05-29  Mg     2.3     05-29    TPro  6.3  /  Alb  2.8<L>  /  TBili  3.6<H>  /  DBili  x   /  AST  40<H>  /  ALT  38<H>  /  AlkPhos  224<H>  05-29    LIVER FUNCTIONS - ( 29 May 2018 04:45 )  Alb: 2.8 g/dL / Pro: 6.3 g/dL / ALK PHOS: 224 u/L / ALT: 38 u/L / AST: 40 u/L / GGT: x                     RADIOLOGY & ADDITIONAL TESTS: Reviewed.    acetaminophen   Tablet. 650 milliGRAM(s) Oral every 6 hours PRN  aspirin enteric coated 81 milliGRAM(s) Oral daily  calcitriol   Capsule 0.25 MICROGram(s) Oral at bedtime  calcium acetate 1334 milliGRAM(s) Oral three times a day with meals  calcium carbonate 500 mG (Tums) Chewable 1 Tablet(s) Chew four times a day  chlorhexidine 4% Liquid 1 Application(s) Topical <User Schedule>  cholecalciferol 1000 Unit(s) Oral daily  dextrose 40% Gel 15 Gram(s) Oral once PRN  dextrose 5%. 1000 milliLiter(s) IV Continuous <Continuous>  dextrose 50% Injectable 12.5 Gram(s) IV Push once  dextrose 50% Injectable 25 Gram(s) IV Push once  dextrose 50% Injectable 25 Gram(s) IV Push once  DOBUTamine Infusion 5 MICROgram(s)/kG/Min IV Continuous <Continuous>  famotidine    Tablet 20 milliGRAM(s) Oral two times a day  gabapentin 100 milliGRAM(s) Oral every 8 hours  glucagon  Injectable 1 milliGRAM(s) IntraMuscular once PRN  heparin  Injectable 5000 Unit(s) SubCutaneous every 12 hours  hydrALAZINE 50 milliGRAM(s) Oral three times a day  insulin lispro (HumaLOG) corrective regimen sliding scale   SubCutaneous three times a day before meals  insulin lispro (HumaLOG) corrective regimen sliding scale   SubCutaneous at bedtime  isosorbide   dinitrate Tablet (ISORDIL) 10 milliGRAM(s) Oral every 8 hours  levothyroxine 150 MICROGram(s) Oral daily  magnesium oxide 400 milliGRAM(s) Oral two times a day with meals  sodium chloride 0.9% lock flush 3 milliLiter(s) IV Push every 8 hours      penicillin (Rash) HPI/INTERVAL HISTORY:  Patient seen and examined at bedside.    Somnolent this AM. Complaining of headache with imdur dosing, continue to treat symptomatically with tylenol.    Denies chest pain, SOB, rash, diarrhea, constipation.    OBJECTIVE:  VITAL SIGNS:  ICU Vital Signs Last 24 Hrs  T(C): 36.5 (29 May 2018 04:00), Max: 36.7 (28 May 2018 13:00)  T(F): 97.7 (29 May 2018 04:00), Max: 98.1 (28 May 2018 19:00)  HR: 97 (29 May 2018 06:00) (89 - 110)  BP: 116/77 (29 May 2018 06:00) (113/71 - 130/79)  BP(mean): 86 (29 May 2018 06:00) (77 - 110)  ABP: --  ABP(mean): --  RR: 16 (29 May 2018 06:00) (10 - 25)  SpO2: 100% (29 May 2018 06:00) (96% - 100%)        05-28 @ 07:01  -  05-29 @ 07:00  --------------------------------------------------------  IN: 1819.3 mL / OUT: 2825 mL / NET: -1005.7 mL      CAPILLARY BLOOD GLUCOSE      POCT Blood Glucose.: 191 mg/dL (28 May 2018 22:40)    Gen: NAD, pleasant, conversant  HEENT: NC/AT; PERRL, anicteric sclera  Neck: supple, JVD estimated 11 cm.   Resp: Mild left > right basilar crackles. Otherwise CTA.  Cardiovasc: S1S2 normal; RRR; no murmurs, rubs or gallops  GI: soft, nondistended, nontender; +BS  Extr: 2+ PPE to the proximal anterior tibia b/l. palpable DP pulses present b/l.  Skin: Changes 2/2 PVD b/l LE.  Neuro: Non-foca    LABS:                        11.2   6.39  )-----------( 133      ( 29 May 2018 04:45 )             34.3     05-29    132<L>  |  90<L>  |  15  ----------------------------<  144<H>  4.1   |  32<H>  |  0.89    Ca    7.2<L>      29 May 2018 04:45  Phos  3.4     05-29  Mg     2.3     05-29    TPro  6.3  /  Alb  2.8<L>  /  TBili  3.6<H>  /  DBili  x   /  AST  40<H>  /  ALT  38<H>  /  AlkPhos  224<H>  05-29    LIVER FUNCTIONS - ( 29 May 2018 04:45 )  Alb: 2.8 g/dL / Pro: 6.3 g/dL / ALK PHOS: 224 u/L / ALT: 38 u/L / AST: 40 u/L / GGT: x                     RADIOLOGY & ADDITIONAL TESTS: Reviewed.    acetaminophen   Tablet. 650 milliGRAM(s) Oral every 6 hours PRN  aspirin enteric coated 81 milliGRAM(s) Oral daily  calcitriol   Capsule 0.25 MICROGram(s) Oral at bedtime  calcium acetate 1334 milliGRAM(s) Oral three times a day with meals  calcium carbonate 500 mG (Tums) Chewable 1 Tablet(s) Chew four times a day  chlorhexidine 4% Liquid 1 Application(s) Topical <User Schedule>  cholecalciferol 1000 Unit(s) Oral daily  dextrose 40% Gel 15 Gram(s) Oral once PRN  dextrose 5%. 1000 milliLiter(s) IV Continuous <Continuous>  dextrose 50% Injectable 12.5 Gram(s) IV Push once  dextrose 50% Injectable 25 Gram(s) IV Push once  dextrose 50% Injectable 25 Gram(s) IV Push once  DOBUTamine Infusion 5 MICROgram(s)/kG/Min IV Continuous <Continuous>  famotidine    Tablet 20 milliGRAM(s) Oral two times a day  gabapentin 100 milliGRAM(s) Oral every 8 hours  glucagon  Injectable 1 milliGRAM(s) IntraMuscular once PRN  heparin  Injectable 5000 Unit(s) SubCutaneous every 12 hours  hydrALAZINE 50 milliGRAM(s) Oral three times a day  insulin lispro (HumaLOG) corrective regimen sliding scale   SubCutaneous three times a day before meals  insulin lispro (HumaLOG) corrective regimen sliding scale   SubCutaneous at bedtime  isosorbide   dinitrate Tablet (ISORDIL) 10 milliGRAM(s) Oral every 8 hours  levothyroxine 150 MICROGram(s) Oral daily  magnesium oxide 400 milliGRAM(s) Oral two times a day with meals  sodium chloride 0.9% lock flush 3 milliLiter(s) IV Push every 8 hours      penicillin (Rash) HPI/INTERVAL HISTORY:  Patient seen and examined at bedside.    Somnolent this AM. Complaining of headache with imdur dosing, continue to treat symptomatically with tylenol.    Denies chest pain, SOB, rash, diarrhea, constipation.    OBJECTIVE:  VITAL SIGNS:  ICU Vital Signs Last 24 Hrs  T(C): 36.5 (29 May 2018 04:00), Max: 36.7 (28 May 2018 13:00)  T(F): 97.7 (29 May 2018 04:00), Max: 98.1 (28 May 2018 19:00)  HR: 97 (29 May 2018 06:00) (89 - 110)  BP: 116/77 (29 May 2018 06:00) (113/71 - 130/79)  BP(mean): 86 (29 May 2018 06:00) (77 - 110)  ABP: --  ABP(mean): --  RR: 16 (29 May 2018 06:00) (10 - 25)  SpO2: 100% (29 May 2018 06:00) (96% - 100%)    05-28 @ 07:01  -  05-29 @ 07:00  --------------------------------------------------------  IN: 1819.3 mL / OUT: 2825 mL / NET: -1005.7 mL    CAPILLARY BLOOD GLUCOSE    POCT Blood Glucose.: 191 mg/dL (28 May 2018 22:40)    Gen: NAD, pleasant, conversant  HEENT: NC/AT; PERRL, anicteric sclera  Neck: supple, JVD estimated 11 cm.   Resp: Mild left > right basilar crackles. Otherwise CTA.  Cardiovasc: S1S2 normal; RRR; no murmurs, rubs or gallops  GI: soft, nondistended, nontender; +BS  Extr: 2+ PPE to the proximal anterior tibia b/l. palpable DP pulses present b/l.  Skin: Changes 2/2 PVD b/l LE.  Neuro: Non-focal    LABS:                        11.2   6.39  )-----------( 133      ( 29 May 2018 04:45 )             34.3     05-29    132<L>  |  90<L>  |  15  ----------------------------<  144<H>  4.1   |  32<H>  |  0.89    Ca    7.2<L>      29 May 2018 04:45  Phos  3.4     05-29  Mg     2.3     05-29    TPro  6.3  /  Alb  2.8<L>  /  TBili  3.6<H>  /  DBili  x   /  AST  40<H>  /  ALT  38<H>  /  AlkPhos  224<H>  05-29    LIVER FUNCTIONS - ( 29 May 2018 04:45 )  Alb: 2.8 g/dL / Pro: 6.3 g/dL / ALK PHOS: 224 u/L / ALT: 38 u/L / AST: 40 u/L / GGT: x                     RADIOLOGY & ADDITIONAL TESTS: Reviewed.    acetaminophen   Tablet. 650 milliGRAM(s) Oral every 6 hours PRN  aspirin enteric coated 81 milliGRAM(s) Oral daily  calcitriol   Capsule 0.25 MICROGram(s) Oral at bedtime  calcium acetate 1334 milliGRAM(s) Oral three times a day with meals  calcium carbonate 500 mG (Tums) Chewable 1 Tablet(s) Chew four times a day  chlorhexidine 4% Liquid 1 Application(s) Topical <User Schedule>  cholecalciferol 1000 Unit(s) Oral daily  dextrose 40% Gel 15 Gram(s) Oral once PRN  dextrose 5%. 1000 milliLiter(s) IV Continuous <Continuous>  dextrose 50% Injectable 12.5 Gram(s) IV Push once  dextrose 50% Injectable 25 Gram(s) IV Push once  dextrose 50% Injectable 25 Gram(s) IV Push once  DOBUTamine Infusion 5 MICROgram(s)/kG/Min IV Continuous <Continuous>  famotidine    Tablet 20 milliGRAM(s) Oral two times a day  gabapentin 100 milliGRAM(s) Oral every 8 hours  glucagon  Injectable 1 milliGRAM(s) IntraMuscular once PRN  heparin  Injectable 5000 Unit(s) SubCutaneous every 12 hours  hydrALAZINE 50 milliGRAM(s) Oral three times a day  insulin lispro (HumaLOG) corrective regimen sliding scale   SubCutaneous three times a day before meals  insulin lispro (HumaLOG) corrective regimen sliding scale   SubCutaneous at bedtime  isosorbide   dinitrate Tablet (ISORDIL) 10 milliGRAM(s) Oral every 8 hours  levothyroxine 150 MICROGram(s) Oral daily  magnesium oxide 400 milliGRAM(s) Oral two times a day with meals  sodium chloride 0.9% lock flush 3 milliLiter(s) IV Push every 8 hours      penicillin (Rash) HPI/INTERVAL HISTORY:  Patient seen and examined at bedside.    Somnolent this AM. Complaining of headache with imdur dosing, continue to treat symptomatically with tylenol.    Denies chest pain, SOB, rash, diarrhea, constipation.    OBJECTIVE:  VITAL SIGNS:  ICU Vital Signs Last 24 Hrs  T(C): 36.5 (29 May 2018 04:00), Max: 36.7 (28 May 2018 13:00)  T(F): 97.7 (29 May 2018 04:00), Max: 98.1 (28 May 2018 19:00)  HR: 97 (29 May 2018 06:00) (89 - 110)  BP: 116/77 (29 May 2018 06:00) (113/71 - 130/79)  BP(mean): 86 (29 May 2018 06:00) (77 - 110)  ABP: --  ABP(mean): --  RR: 16 (29 May 2018 06:00) (10 - 25)  SpO2: 100% (29 May 2018 06:00) (96% - 100%)    05-28 @ 07:01  -  05-29 @ 07:00  --------------------------------------------------------  IN: 1819.3 mL / OUT: 2825 mL / NET: -1005.7 mL    CAPILLARY BLOOD GLUCOSE    POCT Blood Glucose.: 191 mg/dL (28 May 2018 22:40)    Gen: NAD, pleasant, conversant  HEENT: NC/AT; PERRL, anicteric sclera  Neck: supple, JVD estimated 11 cm.   Resp: Mild left > right basilar crackles. Otherwise CTA.  Cardiovasc: S1S2 normal; RRR; no murmurs, rubs or gallops  GI: soft, nondistended, nontender; +BS  Extr: 2+ PPE to the proximal anterior tibia b/l. palpable DP pulses present b/l.  Skin: Changes 2/2 PVD b/l LE.  Neuro: Non-focal    LABS:                        11.2   6.39  )-----------( 133      ( 29 May 2018 04:45 )             34.3     05-29    132<L>  |  90<L>  |  15  ----------------------------<  144<H>  4.1   |  32<H>  |  0.89    Ca    7.2<L>      29 May 2018 04:45  Phos  3.4     05-29  Mg     2.3     05-29    TPro  6.3  /  Alb  2.8<L>  /  TBili  3.6<H>  /  DBili  x   /  AST  40<H>  /  ALT  38<H>  /  AlkPhos  224<H>  05-29    LIVER FUNCTIONS - ( 29 May 2018 04:45 )  Alb: 2.8 g/dL / Pro: 6.3 g/dL / ALK PHOS: 224 u/L / ALT: 38 u/L / AST: 40 u/L / GGT: x           RADIOLOGY & ADDITIONAL TESTS: Reviewed.    acetaminophen   Tablet. 650 milliGRAM(s) Oral every 6 hours PRN  aspirin enteric coated 81 milliGRAM(s) Oral daily  calcitriol   Capsule 0.25 MICROGram(s) Oral at bedtime  calcium acetate 1334 milliGRAM(s) Oral three times a day with meals  calcium carbonate 500 mG (Tums) Chewable 1 Tablet(s) Chew four times a day  chlorhexidine 4% Liquid 1 Application(s) Topical <User Schedule>  cholecalciferol 1000 Unit(s) Oral daily  dextrose 40% Gel 15 Gram(s) Oral once PRN  dextrose 5%. 1000 milliLiter(s) IV Continuous <Continuous>  dextrose 50% Injectable 12.5 Gram(s) IV Push once  dextrose 50% Injectable 25 Gram(s) IV Push once  dextrose 50% Injectable 25 Gram(s) IV Push once  DOBUTamine Infusion 5 MICROgram(s)/kG/Min IV Continuous <Continuous>  famotidine    Tablet 20 milliGRAM(s) Oral two times a day  gabapentin 100 milliGRAM(s) Oral every 8 hours  glucagon  Injectable 1 milliGRAM(s) IntraMuscular once PRN  heparin  Injectable 5000 Unit(s) SubCutaneous every 12 hours  hydrALAZINE 50 milliGRAM(s) Oral three times a day  insulin lispro (HumaLOG) corrective regimen sliding scale   SubCutaneous three times a day before meals  insulin lispro (HumaLOG) corrective regimen sliding scale   SubCutaneous at bedtime  isosorbide   dinitrate Tablet (ISORDIL) 10 milliGRAM(s) Oral every 8 hours  levothyroxine 150 MICROGram(s) Oral daily  magnesium oxide 400 milliGRAM(s) Oral two times a day with meals  sodium chloride 0.9% lock flush 3 milliLiter(s) IV Push every 8 hours      penicillin (Rash)

## 2018-05-29 NOTE — PROGRESS NOTE ADULT - ASSESSMENT
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    The patient has hypervolemic hyponatremia likely secondary to heart failure.  She also has hypocalcemia likely secondary to the fact that she likely has no parathyroid gland. Acute decompensated heart failure.      Endocrine. continue p.o. calcium supplementation and rocaltrol. SP IV Calcium  pending endo consult,    Renal.   Diuresis per CACT/HF team.  . On  gtt at present.  Maintain K >3.5 per CCU  Renal function intact. Maintain fluid restriction, daily weights, I&O's  goal negative 2 L, was ~4.4 L net negative over the past 24 hours	  Gastrointestinal.  likely congestive hepatic pathology.  Hypervolemic hyponatremia Na ~ stable   Phosphorus- Reduce Phoslo to 667 tid.    CVS-Possible Captopril vs Hydralazine and nitrates    [facility]  [phone]

## 2018-05-29 NOTE — CONSULT NOTE ADULT - ASSESSMENT
62F with a past medical history of hypertension, hyperlipidemia, chronic RBBB,  obesity, thyroid CA, nonischemic cardiomyopathy with an ejection fraction of 11% and normal coronaries on recent cardiac catheterization at [facility] with Dr Chavarria , s/p [employer] Sofi MRI CRT-ICD placement in June 2017, admitted last month with acute on chronic systolic HF exacerbation, presenting today with SOB, volume overload, acute on chronic systolic HF exacerbation. Gi called to assess for abdominal pain after taking Bumex and with increased LFTs. Pt known to our service from previous admission in May 2017 for persistent abdominal pain with nausea and found to have transaminitis. During that admission she underwent EGD with esophagitis and duodenopathy and MRCP was also obtained and did not show any evidence of biliary obstruction at that time. Further imaging with CT Abd showed 'Contracted gallbladder with intraluminal stones. Nonspecific gallbladder wall thickening or edema. Appearance is unchanged from the prior recent CT and MRI examinations.
Ms. Carson is a 62 year old woman with acute on chronic systolic heart failure due to a nonischemic cardiomyopathy with a severely dilated left ventricle and severely decreased LV ejection fraction currently in cardiogenic shock requiring IV inotropic support. Her abdominal pain is likely reflective of her low cardiac output. She is urinating well on the current support. She has had multiple hospitalizations and is at high risk for further decline and death. I offered transfer to [facility] for evaluation for advanced therapies, but the family would like to wait to be transferred.     Please call me with questions at [phone]. Plan discussed with Dr. Muller and the CCU team.
62yFemale with severe NICM s/p MDT AICD, HTN, DM, history of thyroid CA, obesity admitted with acute on chronic systolic heart failure.      Problem/Plan - 1:  ·  Problem: Acute on chronic systolic congestive heart failure.  Plan: IV Bumex and diuresing well. Will need Heart failure consult.  Give O2, ASA Bumex as ordered by renal, ACE, BB.  Cardiology helping.     Problem/Plan - 2:  ·  Problem: Essential hypertension.  Plan: Low salt diet.  Continue BP meds.      Problem/Plan - 3:  ·  Problem: Diabetes mellitus, type 2.  Plan: FS QID  HISS   DM diet.      Problem/Plan - 4:  ·  Problem: Asthma.  Plan: Currently stable.   Not on any medications.      Problem/Plan - 5:  ·  Problem: Hypocalcemia .  Plan :Replacing.      Problem/Plan -6:  ·  Problem: Abnormal LFT  .  Plan : Secondary to Hepatic congestion from CHF        Problem/Plan - 7:  Problem: Need for prophylactic measure. Plan: VTE with Heparin 5000U sub cut BID.  Fall, Aspirations and safety precautions,
Assessment  Hypothyroidism:  On synthroid 150 mcg po daily, asymptomatic, tsh not at target considering hx of thyroid cancer.  CHF: On medications, stable, monitored.  HTN: Controlled, On med.

## 2018-05-29 NOTE — CONSULT NOTE ADULT - PROBLEM SELECTOR RECOMMENDATION 3
On meds primary team following up
- now resolved  - pain control prn  - pt reports pain associated with Bumex intake

## 2018-05-29 NOTE — CONSULT NOTE ADULT - PROBLEM SELECTOR RECOMMENDATION 9
Will increase Synthroid dose to 175 mcg po daily, suggest to keep Tsh < 0.5, repeat TFTs in 4 weeks.
- RHC per cardiology   - continue diuresis per cardiology
Continue dobutamine 2.5 mcg/kg/min  Discontinue carvedilol  Start hydralazine 12.5 mg PO TID and uptitrate as tolerated

## 2018-05-29 NOTE — CONSULT NOTE ADULT - PROBLEM SELECTOR RECOMMENDATION 2
On  medications, monitored and followed up by CCU/cardiology team
- secondary to Hepatic congestion from CHF   - previous admissions with similar laboratory findings with negative MRCP/CT scans for biliary obstruction  - will monitor clinically during admission; if becomes symptomatic will further assess with US Abd
She will need evaluation for LVAD/Transplant.

## 2018-05-29 NOTE — PROGRESS NOTE ADULT - PROBLEM SELECTOR PLAN 1
-Patient has had large volume diuresis on this admission 5-7L each day, yesterday into today was -1L.   -Continues to improve w/ Dobutamine, which was decreased to 2.5 mcg/kg/min today. Bumex gtt at 1 mg/hr has been held.    -Renal function stable BUN/Cr 15/0.89. Transaminitis improving. SBP stable 109/61.  -Hemodynamics today: CVP 7, PA sat 62.6%, PA 40/23/30, CO 5.29, CI 2.53. Repeat labs sent off now.   -Would hold off on weaning off  for now. Will discuss w/ Dr. Gallego about plan for this evening.   -Continue hydral 50 mg po TID, Isordil 10 TID. Off BB while on . Can we consider low dose -Patient has had large volume diuresis on this admission 5-7L each day, yesterday into today was -1L.   -Continues to improve w/ Dobutamine, which was decreased to 2.5 mcg/kg/min today. Bumex gtt at 1 mg/hr has been held.    -Renal function stable BUN/Cr 15/0.89. Transaminitis improving. SBP stable 109/61.  -Hemodynamics today: CVP 7, PA sat 62.6%, PA 40/23/30, CO 5.29, CI 2.53. Repeat labs sent off now.   -Would hold off on weaning off  for now. Will discuss w/ Dr. Gallego about plan for this evening.   -Continue hydral 50 mg po TID, Isordil 10 TID. Off BB while on . Can we consider low dose captopril and uptitrate?

## 2018-05-29 NOTE — PROGRESS NOTE ADULT - SUBJECTIVE AND OBJECTIVE BOX
EP ATTENDING    tele: NSR, Biv-pacing, brief episode of NSVT (on dobutamine)    no palpitations, no syncope, no angina, less dyspnea, no ICD shocks    acetaminophen   Tablet. 650 milliGRAM(s) Oral every 6 hours PRN  aspirin enteric coated 81 milliGRAM(s) Oral daily  calcitriol   Capsule 0.25 MICROGram(s) Oral at bedtime  calcium acetate 1334 milliGRAM(s) Oral three times a day with meals  calcium carbonate 500 mG (Tums) Chewable 1 Tablet(s) Chew four times a day  chlorhexidine 4% Liquid 1 Application(s) Topical <User Schedule>  cholecalciferol 1000 Unit(s) Oral daily  dextrose 40% Gel 15 Gram(s) Oral once PRN  dextrose 5%. 1000 milliLiter(s) IV Continuous <Continuous>  dextrose 50% Injectable 12.5 Gram(s) IV Push once  dextrose 50% Injectable 25 Gram(s) IV Push once  dextrose 50% Injectable 25 Gram(s) IV Push once  DOBUTamine Infusion 2.5 MICROgram(s)/kG/Min IV Continuous <Continuous>  famotidine    Tablet 20 milliGRAM(s) Oral two times a day  gabapentin 100 milliGRAM(s) Oral every 8 hours  glucagon  Injectable 1 milliGRAM(s) IntraMuscular once PRN  heparin  Injectable 5000 Unit(s) SubCutaneous every 12 hours  hydrALAZINE 50 milliGRAM(s) Oral three times a day  insulin lispro (HumaLOG) corrective regimen sliding scale   SubCutaneous three times a day before meals  insulin lispro (HumaLOG) corrective regimen sliding scale   SubCutaneous at bedtime  isosorbide   dinitrate Tablet (ISORDIL) 10 milliGRAM(s) Oral every 8 hours  levothyroxine 150 MICROGram(s) Oral daily  magnesium oxide 400 milliGRAM(s) Oral two times a day with meals  sodium chloride 0.9% lock flush 3 milliLiter(s) IV Push every 8 hours                            11.2   6.39  )-----------( 133      ( 29 May 2018 04:45 )             34.3       05-29    132<L>  |  90<L>  |  15  ----------------------------<  144<H>  4.1   |  32<H>  |  0.89    Ca    7.2<L>      29 May 2018 04:45  Phos  3.4     05-29  Mg     2.3     05-29    TPro  6.3  /  Alb  2.8<L>  /  TBili  3.6<H>  /  DBili  x   /  AST  40<H>  /  ALT  38<H>  /  AlkPhos  224<H>  05-29      T(C): 36.7 (05-29-18 @ 08:39), Max: 36.7 (05-28-18 @ 13:00)  HR: 98 (05-29-18 @ 11:00) (89 - 110)  BP: 109/61 (05-29-18 @ 11:00) (109/61 - 129/104)  RR: 21 (05-29-18 @ 11:00) (10 - 25)  SpO2: 100% (05-29-18 @ 11:00) (96% - 100%)  Wt(kg): --    CVP 4  RRR, no murmurs  CTAB  soft nt/nd  no c/c/e      A/P) 61 y/o female PMH hypertension, hyperlipidemia, chronic RBBB, thyroid CA s/p thyroidectomy, nonischemic cardiomyopathy with an ejection fraction of 11% and normal coronaries on left heart cardiac catheterization 2014, s/p [employer] Claria MRI CRT-ICD placement in June 2017 ([place]), admitted with decompensated CHF. EP call for BiV-optimization and NSVT on tele. She denies ICD shocks    -Last interrogation noted 99% BIV pacing with normal function, based on EKG her BIVICD is optimized  -medical management as per cardiology and CHF  -no need for AAD therapy given no ICD shocks because her VT is non-sustained  -no further EP work up needed as long as VT remains non-sustained not requiring ICD shocks  -supportive care as per CCU

## 2018-05-29 NOTE — PROGRESS NOTE ADULT - ASSESSMENT
62F with a past medical history of hypertension, hyperlipidemia, chronic RBBB,  obesity, thyroid CA, nonischemic cardiomyopathy with an ejection fraction of 11% and normal coronaries on recent cardiac catheterization at [facility] with Dr Chavarria , s/p [employer]tronic Sofi MRI CRT-ICD placement in June 2017, admitted last month with acute on chronic systolic HF exacerbation, presenting today with SOB, volume overload, acute on chronic systolic HF exacerbation. Gi called to assess for abdominal pain after taking Bumex and with increased LFTs. Pt known to our service from previous admission in May 2017 for persistent abdominal pain with nausea and found to have transaminitis. During that admission she underwent EGD with esophagitis and duodenopathy and MRCP was also obtained and did not show any evidence of biliary obstruction at that time. Further imaging with CT Abd showed 'Contracted gallbladder with intraluminal stones. Nonspecific gallbladder wall thickening or edema. Appearance is unchanged from the prior recent CT and MRI examinations.

## 2018-05-29 NOTE — PROGRESS NOTE ADULT - SUBJECTIVE AND OBJECTIVE BOX
INTERVAL HPI/OVERNIGHT EVENTS: I feel better .   Vital Signs Last 24 Hrs  T(C): 36.7 (29 May 2018 08:39), Max: 36.7 (28 May 2018 19:00)  T(F): 98 (29 May 2018 08:39), Max: 98.1 (28 May 2018 19:00)  HR: 99 (29 May 2018 14:00) (89 - 110)  BP: 120/93 (29 May 2018 14:00) (103/65 - 129/104)  BP(mean): 101 (29 May 2018 14:00) (71 - 110)  RR: 25 (29 May 2018 14:00) (10 - 25)  SpO2: 100% (29 May 2018 14:00) (96% - 100%)  I&O's Summary    28 May 2018 07:01  -  29 May 2018 07:00  --------------------------------------------------------  IN: 1819.3 mL / OUT: 2825 mL / NET: -1005.7 mL    29 May 2018 07:01  -  29 May 2018 15:51  --------------------------------------------------------  IN: 385 mL / OUT: 760 mL / NET: -375 mL      MEDICATIONS  (STANDING):  aspirin enteric coated 81 milliGRAM(s) Oral daily  calcitriol   Capsule 0.25 MICROGram(s) Oral at bedtime  calcium acetate 667 milliGRAM(s) Oral three times a day with meals  calcium carbonate 500 mG (Tums) Chewable 1 Tablet(s) Chew four times a day  chlorhexidine 4% Liquid 1 Application(s) Topical <User Schedule>  cholecalciferol 1000 Unit(s) Oral daily  dextrose 5%. 1000 milliLiter(s) (50 mL/Hr) IV Continuous <Continuous>  dextrose 50% Injectable 12.5 Gram(s) IV Push once  dextrose 50% Injectable 25 Gram(s) IV Push once  dextrose 50% Injectable 25 Gram(s) IV Push once  DOBUTamine Infusion 2.5 MICROgram(s)/kG/Min (8.527 mL/Hr) IV Continuous <Continuous>  famotidine    Tablet 20 milliGRAM(s) Oral two times a day  gabapentin 100 milliGRAM(s) Oral every 8 hours  heparin  Injectable 5000 Unit(s) SubCutaneous every 12 hours  hydrALAZINE 50 milliGRAM(s) Oral three times a day  insulin lispro (HumaLOG) corrective regimen sliding scale   SubCutaneous three times a day before meals  insulin lispro (HumaLOG) corrective regimen sliding scale   SubCutaneous at bedtime  isosorbide   dinitrate Tablet (ISORDIL) 10 milliGRAM(s) Oral every 8 hours  levothyroxine 175 MICROGram(s) Oral daily  magnesium oxide 400 milliGRAM(s) Oral two times a day with meals  sodium chloride 0.9% lock flush 3 milliLiter(s) IV Push every 8 hours    MEDICATIONS  (PRN):  acetaminophen   Tablet. 650 milliGRAM(s) Oral every 6 hours PRN Moderate Pain (4 - 6)  dextrose 40% Gel 15 Gram(s) Oral once PRN Blood Glucose LESS THAN 70 milliGRAM(s)/deciliter  glucagon  Injectable 1 milliGRAM(s) IntraMuscular once PRN Glucose LESS THAN 70 milligrams/deciliter    LABS:                        11.2   6.39  )-----------( 133      ( 29 May 2018 04:45 )             34.3     05-29    132<L>  |  90<L>  |  15  ----------------------------<  144<H>  4.1   |  32<H>  |  0.89    Ca    7.2<L>      29 May 2018 04:45  Phos  3.4     05-29  Mg     2.3     05-29    TPro  6.3  /  Alb  2.8<L>  /  TBili  3.6<H>  /  DBili  x   /  AST  40<H>  /  ALT  38<H>  /  AlkPhos  224<H>  05-29        CAPILLARY BLOOD GLUCOSE      POCT Blood Glucose.: 182 mg/dL (29 May 2018 12:25)  POCT Blood Glucose.: 154 mg/dL (29 May 2018 08:31)  POCT Blood Glucose.: 191 mg/dL (28 May 2018 22:40)  POCT Blood Glucose.: 145 mg/dL (28 May 2018 18:16)      ABG - ( 29 May 2018 05:30 )  pH, Arterial: 7.53  pH, Blood: x     /  pCO2: 40    /  pO2: 90    / HCO3: 34    / Base Excess: 9.9   /  SaO2: 99.2                REVIEW OF SYSTEMS:  CONSTITUTIONAL: No fever, weight loss, or fatigue  EYES: No eye pain, visual disturbances, or discharge  ENMT:  No difficulty hearing, tinnitus, vertigo; No sinus or throat pain  NECK: No pain or stiffness  BREASTS: No pain, masses, or nipple discharge  RESPIRATORY: No cough, wheezing, chills or hemoptysis; No shortness of breath  CARDIOVASCULAR: No chest pain, palpitations, dizziness, or leg swelling  GASTROINTESTINAL: No abdominal or epigastric pain. No nausea, vomiting, or hematemesis; No diarrhea or constipation. No melena or hematochezia.  GENITOURINARY: No dysuria, frequency, hematuria, or incontinence  NEUROLOGICAL: No headaches, memory loss, loss of strength, numbness, or tremors  SKIN: No itching, burning, rashes, or lesions   LYMPH NODES: No enlarged glands  ENDOCRINE: No heat or cold intolerance; No hair loss  MUSCULOSKELETAL: No joint pain or swelling; No muscle, back, or extremity pain    Consultant(s) Notes Reviewed:  [x ] YES  [ ] NO    PHYSICAL EXAM:  GENERAL: NAD, well-groomed, well-developed,not in any distress ,  HEAD:  Atraumatic, Normocephalic  EYES: EOMI, PERRLA, conjunctiva and sclera clear  ENMT: No tonsillar erythema, exudates, or enlargement; Moist mucous membranes, Good dentition, No lesions  NECK: Supple, No JVD, Normal thyroid  NERVOUS SYSTEM:  Alert & Oriented X3, No focal deficit   CHEST/LUNG: Good air entry bilateral with no  rales, rhonchi, wheezing, or rubs  HEART: Regular rate and rhythm; No murmurs, rubs, or gallops  ABDOMEN: Soft, Nontender, Nondistended; Bowel sounds present  EXTREMITIES:  2+ Peripheral Pulses, No clubbing, cyanosis, but less  edema  SKIN: No rashes or lesions    Care Discussed with Consultants/Other Providers [ x] YES  [ ] NO

## 2018-05-29 NOTE — PROGRESS NOTE ADULT - SUBJECTIVE AND OBJECTIVE BOX
NEPHROLOGY-NSN [phone]        Patient seen and examined in bed.  She remains on  gtt        MEDICATIONS  (STANDING):  aspirin enteric coated 81 milliGRAM(s) Oral daily  calcitriol   Capsule 0.25 MICROGram(s) Oral at bedtime  calcium acetate 1334 milliGRAM(s) Oral three times a day with meals  calcium carbonate 500 mG (Tums) Chewable 1 Tablet(s) Chew four times a day  chlorhexidine 4% Liquid 1 Application(s) Topical <User Schedule>  cholecalciferol 1000 Unit(s) Oral daily  dextrose 5%. 1000 milliLiter(s) (50 mL/Hr) IV Continuous <Continuous>  dextrose 50% Injectable 12.5 Gram(s) IV Push once  dextrose 50% Injectable 25 Gram(s) IV Push once  dextrose 50% Injectable 25 Gram(s) IV Push once  DOBUTamine Infusion 2.5 MICROgram(s)/kG/Min (8.527 mL/Hr) IV Continuous <Continuous>  famotidine    Tablet 20 milliGRAM(s) Oral two times a day  gabapentin 100 milliGRAM(s) Oral every 8 hours  heparin  Injectable 5000 Unit(s) SubCutaneous every 12 hours  hydrALAZINE 50 milliGRAM(s) Oral three times a day  insulin lispro (HumaLOG) corrective regimen sliding scale   SubCutaneous three times a day before meals  insulin lispro (HumaLOG) corrective regimen sliding scale   SubCutaneous at bedtime  isosorbide   dinitrate Tablet (ISORDIL) 10 milliGRAM(s) Oral every 8 hours  levothyroxine 175 MICROGram(s) Oral daily  magnesium oxide 400 milliGRAM(s) Oral two times a day with meals  sodium chloride 0.9% lock flush 3 milliLiter(s) IV Push every 8 hours      VITAL:  T(C): , Max: 36.7 (18 @ 19:00)  T(F): , Max: 98.1 (18 @ 19:00)  HR: 99 (18 @ 14:00)  BP: 120/93 (18 @ 14:00)  BP(mean): 101 (18 @ 14:00)  RR: 25 (18 @ 14:00)  SpO2: 100% (18 @ 14:00)  Wt(kg): --    I and O's:     @ 07: @ 07:00  --------------------------------------------------------  IN: 1819.3 mL / OUT: 2825 mL / NET: -1005.7 mL     @ 07: @ 15:23  --------------------------------------------------------  IN: 385 mL / OUT: 760 mL / NET: -375 mL          PHYSICAL EXAM:    Constitutional: NAD  HEENT: PERRLA    Neck:  No JVD  Respiratory: reduced breath sounds  Cardiovascular: S1 and S2  Gastrointestinal: BS+, soft, NT/ND  Extremities: wrinkled  peripheral edema  Neurological: A/O x 3, no focal deficits  Psychiatric: Normal mood, normal affect  : No Gustafson  Skin: No rashes  Access: Not applicable    LABS:                        11.2   6.39  )-----------( 133      ( 29 May 2018 04:45 )             34.3         132<L>  |  90<L>  |  15  ----------------------------<  144<H>  4.1   |  32<H>  |  0.89    Ca    7.2<L>      29 May 2018 04:45  Phos  3.4       Mg     2.3         TPro  6.3  /  Alb  2.8<L>  /  TBili  3.6<H>  /  DBili  x   /  AST  40<H>  /  ALT  38<H>  /  AlkPhos  224<H>            Urine Studies:          RADIOLOGY & ADDITIONAL STUDIES:      < from: Xray Chest 1 View- PORTABLE-Routine (18 @ 07:09) >    EXAM:  XR CHEST PORTABLE ROUTINE 1V        PROCEDURE DATE:  May 29 2018         INTERPRETATION:    CLINICAL INDICATION: Status post central line placement.    TECHNIQUE: AP view of the chest.    COMPARISON: Chest radiograph from 2018.    FINDINGS:   Left chest wall biventricular pacemaker ICD seen. Generator obscures part   of left lung.  Right IJ approach [place]-Radha catheter, its tip terminating in the right   pulmonary artery.  Cardiomediastinal silhouette is enlarged.   No focal lung consolidation.  No pleural effusion.   No pneumothorax.     IMPRESSION:   [place]-Radha catheter in place.  Clear lungs. Cardiomegaly.              CHRIS BRUNO M.D., RADIOLOGY RESIDENT  This document has been electronically signed.  BELLA HERMAN M.D., ATTENDING RADIOLOGIST  This document has been electronically signed. May 29 2018 10:44AM                  < end of copied text >

## 2018-05-29 NOTE — PROGRESS NOTE ADULT - SUBJECTIVE AND OBJECTIVE BOX
Patient seen and examined.     Medications:  acetaminophen   Tablet. 650 milliGRAM(s) Oral every 6 hours PRN  aspirin enteric coated 81 milliGRAM(s) Oral daily  calcitriol   Capsule 0.25 MICROGram(s) Oral at bedtime  calcium acetate 1334 milliGRAM(s) Oral three times a day with meals  calcium carbonate 500 mG (Tums) Chewable 1 Tablet(s) Chew four times a day  chlorhexidine 4% Liquid 1 Application(s) Topical <User Schedule>  cholecalciferol 1000 Unit(s) Oral daily  dextrose 40% Gel 15 Gram(s) Oral once PRN  dextrose 5%. 1000 milliLiter(s) IV Continuous <Continuous>  dextrose 50% Injectable 12.5 Gram(s) IV Push once  dextrose 50% Injectable 25 Gram(s) IV Push once  dextrose 50% Injectable 25 Gram(s) IV Push once  DOBUTamine Infusion 2.5 MICROgram(s)/kG/Min IV Continuous <Continuous>  famotidine    Tablet 20 milliGRAM(s) Oral two times a day  gabapentin 100 milliGRAM(s) Oral every 8 hours  glucagon  Injectable 1 milliGRAM(s) IntraMuscular once PRN  heparin  Injectable 5000 Unit(s) SubCutaneous every 12 hours  hydrALAZINE 50 milliGRAM(s) Oral three times a day  insulin lispro (HumaLOG) corrective regimen sliding scale   SubCutaneous three times a day before meals  insulin lispro (HumaLOG) corrective regimen sliding scale   SubCutaneous at bedtime  isosorbide   dinitrate Tablet (ISORDIL) 10 milliGRAM(s) Oral every 8 hours  levothyroxine 150 MICROGram(s) Oral daily  magnesium oxide 400 milliGRAM(s) Oral two times a day with meals  sodium chloride 0.9% lock flush 3 milliLiter(s) IV Push every 8 hours      Vitals:  Vital Signs Last 24 Hrs  T(C): 36.7 (29 May 2018 08:39), Max: 36.7 (28 May 2018 13:00)  T(F): 98 (29 May 2018 08:39), Max: 98.1 (28 May 2018 19:00)  HR: 98 (29 May 2018 11:00) (89 - 110)  BP: 109/61 (29 May 2018 11:00) (109/61 - 129/104)  BP(mean): 71 (29 May 2018 11:00) (71 - 110)  RR: 21 (29 May 2018 11:00) (10 - 25)  SpO2: 100% (29 May 2018 11:00) (96% - 100%)    Daily     Daily Weight in k.4 (29 May 2018 04:00)    I&O's Detail    28 May 2018 07:01  -  29 May 2018 07:00  --------------------------------------------------------  IN:    DOBUTamine Infusion: 409.3 mL    IV PiggyBack: 450 mL    Oral Fluid: 960 mL  Total IN: 1819.3 mL    OUT:    Indwelling Catheter - Urethral: 2825 mL  Total OUT: 2825 mL    Total NET: -1005.7 mL      29 May 2018 07:01  -  29 May 2018 11:55  --------------------------------------------------------  IN:    DOBUTamine Infusion: 51 mL    DOBUTamine Infusion: 8.5 mL    Oral Fluid: 180 mL  Total IN: 239.5 mL    OUT:    Indwelling Catheter - Urethral: 545 mL  Total OUT: 545 mL    Total NET: -305.5 mL          Physical Exam:     General: No distress. Comfortable.  HEENT: EOM intact.  Neck: Neck supple. JVP not elevated. No masses  Chest: Clear to auscultation bilaterally  CV: Normal S1 and S2. No murmurs, rub, or gallops. Radial pulses normal.  Abdomen: Soft, non-distended, non-tender  Skin: No rashes or skin breakdown  Neurology: Alert and oriented times three. Sensation intact  Psych: Affect normal    Labs:                        11.2   6.39  )-----------( 133      ( 29 May 2018 04:45 )             34.3     -    132<L>  |  90<L>  |  15  ----------------------------<  144<H>  4.1   |  32<H>  |  0.89    Ca    7.2<L>      29 May 2018 04:45  Phos  3.4     05-29  Mg     2.3         TPro  6.3  /  Alb  2.8<L>  /  TBili  3.6<H>  /  DBili  x   /  AST  40<H>  /  ALT  38<H>  /  AlkPhos  224<H>   Patient seen and examined. She has been having large volume diuresis, and Bumex gtt at 1 mg/hr was held yesterday at 10 am. She continued to self diurese. Today she states she feels a lot better since admission. LE edema has come down tremendously per patient. Has PA catheter in place. Not SOB at rest, or with movement in bed, no orthopnea, PND, CP, palpitations.     Medications:  acetaminophen   Tablet. 650 milliGRAM(s) Oral every 6 hours PRN  aspirin enteric coated 81 milliGRAM(s) Oral daily  calcitriol   Capsule 0.25 MICROGram(s) Oral at bedtime  calcium acetate 1334 milliGRAM(s) Oral three times a day with meals  calcium carbonate 500 mG (Tums) Chewable 1 Tablet(s) Chew four times a day  chlorhexidine 4% Liquid 1 Application(s) Topical <User Schedule>  cholecalciferol 1000 Unit(s) Oral daily  dextrose 40% Gel 15 Gram(s) Oral once PRN  dextrose 5%. 1000 milliLiter(s) IV Continuous <Continuous>  dextrose 50% Injectable 12.5 Gram(s) IV Push once  dextrose 50% Injectable 25 Gram(s) IV Push once  dextrose 50% Injectable 25 Gram(s) IV Push once  DOBUTamine Infusion 2.5 MICROgram(s)/kG/Min IV Continuous <Continuous>  famotidine    Tablet 20 milliGRAM(s) Oral two times a day  gabapentin 100 milliGRAM(s) Oral every 8 hours  glucagon  Injectable 1 milliGRAM(s) IntraMuscular once PRN  heparin  Injectable 5000 Unit(s) SubCutaneous every 12 hours  hydrALAZINE 50 milliGRAM(s) Oral three times a day  insulin lispro (HumaLOG) corrective regimen sliding scale   SubCutaneous three times a day before meals  insulin lispro (HumaLOG) corrective regimen sliding scale   SubCutaneous at bedtime  isosorbide   dinitrate Tablet (ISORDIL) 10 milliGRAM(s) Oral every 8 hours  levothyroxine 150 MICROGram(s) Oral daily  magnesium oxide 400 milliGRAM(s) Oral two times a day with meals  sodium chloride 0.9% lock flush 3 milliLiter(s) IV Push every 8 hours      Vitals:  Vital Signs Last 24 Hrs  T(C): 36.7 (29 May 2018 08:39), Max: 36.7 (28 May 2018 13:00)  T(F): 98 (29 May 2018 08:39), Max: 98.1 (28 May 2018 19:00)  HR: 98 (29 May 2018 11:00) (89 - 110)  BP: 109/61 (29 May 2018 11:00) (109/61 - 129/104)  BP(mean): 71 (29 May 2018 11:00) (71 - 110)  RR: 21 (29 May 2018 11:00) (10 - 25)  SpO2: 100% (29 May 2018 11:00) (96% - 100%)    Daily     Daily Weight in k.4 (29 May 2018 04:00)    I&O's Detail    28 May 2018 07:01  -  29 May 2018 07:00  --------------------------------------------------------  IN:    DOBUTamine Infusion: 409.3 mL    IV PiggyBack: 450 mL    Oral Fluid: 960 mL  Total IN: 1819.3 mL    OUT:    Indwelling Catheter - Urethral: 2825 mL  Total OUT: 2825 mL    Total NET: -1005.7 mL      29 May 2018 07:01  -  29 May 2018 11:55  --------------------------------------------------------  IN:    DOBUTamine Infusion: 51 mL    DOBUTamine Infusion: 8.5 mL    Oral Fluid: 180 mL  Total IN: 239.5 mL    OUT:    Indwelling Catheter - Urethral: 545 mL  Total OUT: 545 mL    Total NET: -305.5 mL          Physical Exam:     General: No distress. Comfortable.  HEENT: EOM intact.  Neck: Neck supple. JVP not elevated. No masses  Chest: Clear to auscultation bilaterally  CV: Normal S1 and S2. No murmurs, rub, or gallops. Radial pulses normal. Trace b/l LE edema.   Abdomen: Soft, non-distended, non-tender  Skin: No rashes or skin breakdown  Neurology: Alert and oriented times three. Sensation intact  Psych: Affect normal    Labs:                        11.2   6.39  )-----------( 133      ( 29 May 2018 04:45 )             34.3         132<L>  |  90<L>  |  15  ----------------------------<  144<H>  4.1   |  32<H>  |  0.89    Ca    7.2<L>      29 May 2018 04:45  Phos  3.4       Mg     2.3         TPro  6.3  /  Alb  2.8<L>  /  TBili  3.6<H>  /  DBili  x   /  AST  40<H>  /  ALT  38<H>  /  AlkPhos  224<H>      < from: TTE with Doppler (w/Cont) (18 @ 20:04) >  DIMENSIONS:  Dimensions:     Normal Values:  LA:     4.7 cm    2.0 - 4.0 cm  Ao:     3.3 cm    2.0 - 3.8 cm  SEPTUM: 0.9 cm    0.6 - 1.2 cm  PWT:    0.8 cm    0.6 - 1.1 cm  LVIDd:  6.3 cm    3.0 - 5.6 cm  LVIDs:  6.0 cm    1.8 -4.0 cm  Derived Variables:  LVMI: 98 g/m2  RWT: 0.25  Fractional short: 5 %  Ejection Fraction (Teicholtz): 11 %  ------------------------------------------------------------------------  OBSERVATIONS:  Mitral Valve: Mitral annular calcification, otherwise  normal mitral valve. Moderate mitral regurgitation.  Aortic Root: Normal aortic root.  Aortic Valve: Calcified trileaflet aortic valve with  decreased opening.  Left Atrium: Moderately dilated left atrium.  LA volume  index = 47 cc/m2.  Left Ventricle: Severe global left ventricular systolic  dysfunction.  Endocardial visualization enhanced with  intravenous injection of echo contrast (Definity).  No LV  thrombus seen. Mild left ventricular enlargement.  Right Heart: Moderate right atrial enlargement. Right  ventricular enlargement with decreased right ventricular  systolic function.  A device wire is noted in the right  heart. Normal tricuspid valve.  Severe tricuspid  regurgitation. Normal pulmonic valve.  Mild pulmonic  regurgitation.  Pericardium/PleuraNormal pericardium with no pericardial  effusion.  Hemodynamic: Estimated right ventricular systolic pressure  equals 42 mm Hg, assuming right atrial pressure equals 10  mm Hg, consistent with mild pulmonary hypertension.    < end of copied text >

## 2018-05-29 NOTE — PROGRESS NOTE ADULT - PROBLEM SELECTOR PLAN 2
- LFTs stable  - secondary to Hepatic congestion from CHF   - previous admissions with similar laboratory findings with negative MRCP/CT scans for biliary obstruction  - will monitor clinically during admission; if becomes symptomatic will further assess with US Abd

## 2018-05-29 NOTE — CONSULT NOTE ADULT - PROBLEM SELECTOR PROBLEM 2
Acute on chronic systolic heart failure, NYHA class 4
Acute on chronic systolic heart failure, NYHA class 4
Liver enzyme elevation

## 2018-05-29 NOTE — PROGRESS NOTE ADULT - PROBLEM SELECTOR PLAN 3
Continues to diurese well with 4.3L output over 24 hrs. Wt. down to 98 kg from 107.3 on 5/26. c/w dobutamine as noted. Hydralazine titrated up to 50 mg TID will consider continuing to increase as BP tolerates. Repleting K and Mg as needed. Check BMPs at least Q6hr. Per HF recs patient will need eventual evaluation for LVAD/transplant. Continues to diurese well with 2.8L output over 24 hrs. Wt. down to 98 kg from 107.3 on 5/26. c/w dobutamine as noted. c/w hydralazine 50 mg tid, isordil 10 mg q8hrs. Repleting K and Mg as needed. Per HF recs patient will need eventual evaluation for LVAD/transplant.

## 2018-05-29 NOTE — PROGRESS NOTE ADULT - SUBJECTIVE AND OBJECTIVE BOX
INTERVAL HPI/OVERNIGHT EVENTS:    sleeping  no acute GI events overnight/over the weekend    MEDICATIONS  (STANDING):  aspirin enteric coated 81 milliGRAM(s) Oral daily  calcitriol   Capsule 0.25 MICROGram(s) Oral at bedtime  calcium acetate 1334 milliGRAM(s) Oral three times a day with meals  calcium carbonate 500 mG (Tums) Chewable 1 Tablet(s) Chew four times a day  chlorhexidine 4% Liquid 1 Application(s) Topical <User Schedule>  cholecalciferol 1000 Unit(s) Oral daily  dextrose 5%. 1000 milliLiter(s) (50 mL/Hr) IV Continuous <Continuous>  dextrose 50% Injectable 12.5 Gram(s) IV Push once  dextrose 50% Injectable 25 Gram(s) IV Push once  dextrose 50% Injectable 25 Gram(s) IV Push once  DOBUTamine Infusion 2.5 MICROgram(s)/kG/Min (8.527 mL/Hr) IV Continuous <Continuous>  famotidine    Tablet 20 milliGRAM(s) Oral two times a day  gabapentin 100 milliGRAM(s) Oral every 8 hours  heparin  Injectable 5000 Unit(s) SubCutaneous every 12 hours  hydrALAZINE 50 milliGRAM(s) Oral three times a day  insulin lispro (HumaLOG) corrective regimen sliding scale   SubCutaneous three times a day before meals  insulin lispro (HumaLOG) corrective regimen sliding scale   SubCutaneous at bedtime  isosorbide   dinitrate Tablet (ISORDIL) 10 milliGRAM(s) Oral every 8 hours  levothyroxine 150 MICROGram(s) Oral daily  magnesium oxide 400 milliGRAM(s) Oral two times a day with meals  sodium chloride 0.9% lock flush 3 milliLiter(s) IV Push every 8 hours    MEDICATIONS  (PRN):  acetaminophen   Tablet. 650 milliGRAM(s) Oral every 6 hours PRN Moderate Pain (4 - 6)  dextrose 40% Gel 15 Gram(s) Oral once PRN Blood Glucose LESS THAN 70 milliGRAM(s)/deciliter  glucagon  Injectable 1 milliGRAM(s) IntraMuscular once PRN Glucose LESS THAN 70 milligrams/deciliter      Allergies    penicillin (Rash)    Intolerances        Review of Systems:    General:  No wt loss, fevers, chills, night sweats, fatigue   Eyes:  Good vision, no reported pain  ENT:  No sore throat, pain, runny nose, dysphagia  CV:  No pain, palpitations, hypo/hypertension  Resp:  No dyspnea, cough, tachypnea, wheezing  GI:  No pain, No nausea, No vomiting, No diarrhea, No constipation, No weight loss, No fever, No pruritis, No rectal bleeding, No melena, No dysphagia  :  No pain, bleeding, incontinence, nocturia  Muscle:  No pain, weakness  Neuro:  No weakness, tingling, memory problems  Psych:  No fatigue, insomnia, mood problems, depression  Endocrine:  No polyuria, polydypsia, cold/heat intolerance  Heme:  No petechiae, ecchymosis, easy bruisability  Skin:  No rash, tattoos, scars, edema      Vital Signs Last 24 Hrs  T(C): 36.7 (29 May 2018 08:39), Max: 36.7 (28 May 2018 13:00)  T(F): 98 (29 May 2018 08:39), Max: 98.1 (28 May 2018 19:00)  HR: 96 (29 May 2018 10:00) (89 - 110)  BP: 119/75 (29 May 2018 10:00) (113/71 - 129/104)  BP(mean): 85 (29 May 2018 10:00) (76 - 110)  RR: 10 (29 May 2018 10:00) (10 - 25)  SpO2: 100% (29 May 2018 10:00) (96% - 100%)    PHYSICAL EXAM:    Constitutional: NAD  HEENT: EOMI, throat clear  Neck: No LAD, supple  Respiratory: CTA and P  Cardiovascular: S1 and S2, RRR, no M  Gastrointestinal: BS+, soft, NT/ND, neg HSM,  Extremities: No peripheral edema, neg clubbing, cyanosis  Vascular: 2+ peripheral pulses  Neurological: A/O x 3, no focal deficits  Psychiatric: Normal mood, normal affect  Skin: No rashes      LABS:                        11.2   6.39  )-----------( 133      ( 29 May 2018 04:45 )             34.3     05-29    132<L>  |  90<L>  |  15  ----------------------------<  144<H>  4.1   |  32<H>  |  0.89    Ca    7.2<L>      29 May 2018 04:45  Phos  3.4     05-29  Mg     2.3     05-29    TPro  6.3  /  Alb  2.8<L>  /  TBili  3.6<H>  /  DBili  x   /  AST  40<H>  /  ALT  38<H>  /  AlkPhos  224<H>  05-29          RADIOLOGY & ADDITIONAL TESTS:

## 2018-05-29 NOTE — PROGRESS NOTE ADULT - SUBJECTIVE AND OBJECTIVE BOX
Patient denies CP, Breathing much better.  Clinically improving  diuresing well    MEDICATIONS  (STANDING):  aspirin enteric coated 81 milliGRAM(s) Oral daily  calcitriol   Capsule 0.25 MICROGram(s) Oral at bedtime  calcium acetate 667 milliGRAM(s) Oral three times a day with meals  calcium carbonate 500 mG (Tums) Chewable 1 Tablet(s) Chew four times a day  chlorhexidine 4% Liquid 1 Application(s) Topical <User Schedule>  cholecalciferol 1000 Unit(s) Oral daily  DOBUTamine Infusion 2.5 MICROgram(s)/kG/Min (8.527 mL/Hr) IV Continuous <Continuous>  famotidine    Tablet 20 milliGRAM(s) Oral two times a day  gabapentin 100 milliGRAM(s) Oral every 8 hours  heparin  Injectable 5000 Unit(s) SubCutaneous every 12 hours  hydrALAZINE 50 milliGRAM(s) Oral three times a day  insulin lispro (HumaLOG) corrective regimen sliding scale   SubCutaneous three times a day before meals  insulin lispro (HumaLOG) corrective regimen sliding scale   SubCutaneous at bedtime  isosorbide   dinitrate Tablet (ISORDIL) 10 milliGRAM(s) Oral every 8 hours  levothyroxine 175 MICROGram(s) Oral daily  magnesium oxide 400 milliGRAM(s) Oral two times a day with meals  sodium chloride 0.9% lock flush 3 milliLiter(s) IV Push every 8 hours    MEDICATIONS  (PRN):  acetaminophen   Tablet. 650 milliGRAM(s) Oral every 6 hours PRN Moderate Pain (4 - 6)  dextrose 40% Gel 15 Gram(s) Oral once PRN Blood Glucose LESS THAN 70 milliGRAM(s)/deciliter  glucagon  Injectable 1 milliGRAM(s) IntraMuscular once PRN Glucose LESS THAN 70 milligrams/deciliter      LABS:                        11.2   6.39  )-----------( 133      ( 29 May 2018 04:45 )             34.3     132<L>  |  90<L>  |  15  ----------------------------<  144<H>  4.1   |  32<H>  |  0.89    Ca    7.2<L>      29 May 2018 04:45  Phos  3.4     05-  Mg     2.3     05-    TPro  6.3  /  Alb  2.8<L>  /  TBili  3.6<H>  /  DBili  x   /  AST  40<H>  /  ALT  38<H>  /  AlkPhos  224<H>      Creatinine Trend: 0.89<--, 0.99<--, 0.97<--, 1.11<--, 1.02<--, 0.93<--     PHYSICAL EXAM  Vital Signs Last 24 Hrs  T(C): 36.7 (29 May 2018 08:39), Max: 36.7 (28 May 2018 19:00)  T(F): 98 (29 May 2018 08:39), Max: 98.1 (28 May 2018 19:00)  HR: 99 (29 May 2018 14:00) (89 - 110)  BP: 120/93 (29 May 2018 14:00) (103/65 - 129/104)  BP(mean): 101 (29 May 2018 14:00) (71 - 110)  RR: 25 (29 May 2018 14:00) (10 - 25)  SpO2: 100% (29 May 2018 14:00) (96% - 100%)    HEENT:   Normal oral mucosa, PERRL, EOMI	  Lymphatic: No obvious lymphadenopathy , (+) edema decreaseing  Cardiovascular: Normal S1 S2, No JVD, 1/6 ROSALINDA murmur, Peripheral pulses palpable 2+ bilaterally  Respiratory: Lungs clear to auscultation, normal effort 	  Gastrointestinal:  Soft, Non-tender, + BS	  Skin: No rashes,  No cyanosis, warm to touch  Musculoskeletal: Normal range of motion, normal strength  Psychiatry:  Appropriate Mood & affect     TELEMETRY: Vpaced	       < from: TTE with Doppler (w/Cont) (18 @ 20:04) >  CONCLUSIONS:  1. Mitral annular calcification, otherwise normal mitral  valve. Moderate mitral regurgitation.  2. Moderately dilated left atrium.  LA volume index = 47  cc/m2.  3. Mild left ventricular enlargement.  4. Severe global left ventricular systolic dysfunction.  Endocardial visualization enhanced with intravenous  injection of echo contrast (Definity).  No LV thrombus  seen.  5. Right ventricular enlargement with decreased right  ventricular systolic function.  A device wire is noted in  the right heart.  6. Estimated right ventricular systolic pressure equals 42  mm Hg, assuming right atrial pressure equals 10 mm Hg,  consistent with mild pulmonary hypertension.  7.Normal tricuspid valve.  Severe tricuspid regurgitation.  *** Compared with echocardiogram of 2017, left  ventricular systolic function has deteriorated.  ------------------------------------------------------------------------  Confirmed on  2018 - 13:50:18 by Xander Tyson M.D.    < end of copied text >      ASSESSMENT/PLAN:   62 year old Female with past medical history of hypertension, hyperlipidemia, chronic RBBB, obesity, thyroid CA s/p thyroidectomy, hypocalcemia (managed by [facility] endo Dr murray), nonischemic cardiomyopathy with an ejection fraction of 11% and  normal coronaries on left heart cardiac catheterization  , s/p [employer] Claria MRI CRT-ICD placement in 2017, admitted last month with acute on chronic systolic HF exacerbation, re admitted with acute on chronic systolic HF exacerbation, s/p RHC revealing low CI, trx to CCU for 	    - cardiac index remains adequate  - cont  per HF team  - Strict I+O's   - hypocalcemia, Endo f/u cont to replete      Reina Chino PA-C Patient denies CP, Breathing much better.  Clinically improving  diuresing well    MEDICATIONS  (STANDING):  aspirin enteric coated 81 milliGRAM(s) Oral daily  calcitriol   Capsule 0.25 MICROGram(s) Oral at bedtime  calcium acetate 667 milliGRAM(s) Oral three times a day with meals  calcium carbonate 500 mG (Tums) Chewable 1 Tablet(s) Chew four times a day  chlorhexidine 4% Liquid 1 Application(s) Topical <User Schedule>  cholecalciferol 1000 Unit(s) Oral daily  DOBUTamine Infusion 2.5 MICROgram(s)/kG/Min (8.527 mL/Hr) IV Continuous <Continuous>  famotidine    Tablet 20 milliGRAM(s) Oral two times a day  gabapentin 100 milliGRAM(s) Oral every 8 hours  heparin  Injectable 5000 Unit(s) SubCutaneous every 12 hours  hydrALAZINE 50 milliGRAM(s) Oral three times a day  insulin lispro (HumaLOG) corrective regimen sliding scale   SubCutaneous three times a day before meals  insulin lispro (HumaLOG) corrective regimen sliding scale   SubCutaneous at bedtime  isosorbide   dinitrate Tablet (ISORDIL) 10 milliGRAM(s) Oral every 8 hours  levothyroxine 175 MICROGram(s) Oral daily  magnesium oxide 400 milliGRAM(s) Oral two times a day with meals  sodium chloride 0.9% lock flush 3 milliLiter(s) IV Push every 8 hours    MEDICATIONS  (PRN):  acetaminophen   Tablet. 650 milliGRAM(s) Oral every 6 hours PRN Moderate Pain (4 - 6)  dextrose 40% Gel 15 Gram(s) Oral once PRN Blood Glucose LESS THAN 70 milliGRAM(s)/deciliter  glucagon  Injectable 1 milliGRAM(s) IntraMuscular once PRN Glucose LESS THAN 70 milligrams/deciliter      LABS:                        11.2   6.39  )-----------( 133      ( 29 May 2018 04:45 )             34.3     132<L>  |  90<L>  |  15  ----------------------------<  144<H>  4.1   |  32<H>  |  0.89    Ca    7.2<L>      29 May 2018 04:45  Phos  3.4     05-  Mg     2.3     05-    TPro  6.3  /  Alb  2.8<L>  /  TBili  3.6<H>  /  DBili  x   /  AST  40<H>  /  ALT  38<H>  /  AlkPhos  224<H>      Creatinine Trend: 0.89<--, 0.99<--, 0.97<--, 1.11<--, 1.02<--, 0.93<--     PHYSICAL EXAM  Vital Signs Last 24 Hrs  T(C): 36.7 (29 May 2018 08:39), Max: 36.7 (28 May 2018 19:00)  T(F): 98 (29 May 2018 08:39), Max: 98.1 (28 May 2018 19:00)  HR: 99 (29 May 2018 14:00) (89 - 110)  BP: 120/93 (29 May 2018 14:00) (103/65 - 129/104)  BP(mean): 101 (29 May 2018 14:00) (71 - 110)  RR: 25 (29 May 2018 14:00) (10 - 25)  SpO2: 100% (29 May 2018 14:00) (96% - 100%)    HEENT:   Normal oral mucosa, PERRL, EOMI	  Lymphatic: No obvious lymphadenopathy , (+) edema decreaseing  Cardiovascular: Normal S1 S2, No JVD, 1/6 ROSALINDA murmur, Peripheral pulses palpable 2+ bilaterally  Respiratory: Lungs clear to auscultation, normal effort 	  Gastrointestinal:  Soft, Non-tender, + BS	  Skin: No rashes,  No cyanosis, warm to touch  Musculoskeletal: Normal range of motion, normal strength  Psychiatry:  Appropriate Mood & affect     TELEMETRY: Vpaced	       < from: TTE with Doppler (w/Cont) (18 @ 20:04) >  CONCLUSIONS:  1. Mitral annular calcification, otherwise normal mitral  valve. Moderate mitral regurgitation.  2. Moderately dilated left atrium.  LA volume index = 47  cc/m2.  3. Mild left ventricular enlargement.  4. Severe global left ventricular systolic dysfunction.  Endocardial visualization enhanced with intravenous  injection of echo contrast (Definity).  No LV thrombus  seen.  5. Right ventricular enlargement with decreased right  ventricular systolic function.  A device wire is noted in  the right heart.  6. Estimated right ventricular systolic pressure equals 42  mm Hg, assuming right atrial pressure equals 10 mm Hg,  consistent with mild pulmonary hypertension.  7.Normal tricuspid valve.  Severe tricuspid regurgitation.  *** Compared with echocardiogram of 2017, left  ventricular systolic function has deteriorated.  ------------------------------------------------------------------------  Confirmed on  2018 - 13:50:18 by Xander Tyson M.D.    < end of copied text >      ASSESSMENT/PLAN:   62 year old Female with past medical history of hypertension, hyperlipidemia, chronic RBBB, obesity, thyroid CA s/p thyroidectomy, hypocalcemia (managed by [facility] endo Dr murray), nonischemic cardiomyopathy with an ejection fraction of 11% and  normal coronaries on left heart cardiac catheterization  , s/p [employer] Claria MRI CRT-ICD placement in 2017, admitted last month with acute on chronic systolic HF exacerbation, re admitted with acute on chronic systolic HF exacerbation, s/p RHC revealing low CI, trx to CCU for 	    - cardiac index remains adequate  - cont    - Strict I+O's   - hypocalcemia, Endo f/u cont to replete      Reina Chino PA-C

## 2018-05-30 LAB
ALBUMIN SERPL ELPH-MCNC: 2.9 G/DL — LOW (ref 3.3–5)
ALP SERPL-CCNC: 251 U/L — HIGH (ref 40–120)
ALT FLD-CCNC: 44 U/L — HIGH (ref 4–33)
AST SERPL-CCNC: 55 U/L — HIGH (ref 4–32)
BASE EXCESS BLDV CALC-SCNC: 8.6 MMOL/L — SIGNIFICANT CHANGE UP
BASE EXCESS BLDV CALC-SCNC: 8.8 MMOL/L — SIGNIFICANT CHANGE UP
BASOPHILS # BLD AUTO: 0.08 K/UL — SIGNIFICANT CHANGE UP (ref 0–0.2)
BASOPHILS NFR BLD AUTO: 1.2 % — SIGNIFICANT CHANGE UP (ref 0–2)
BILIRUB SERPL-MCNC: 3.6 MG/DL — HIGH (ref 0.2–1.2)
BUN SERPL-MCNC: 12 MG/DL — SIGNIFICANT CHANGE UP (ref 7–23)
BUN SERPL-MCNC: 15 MG/DL — SIGNIFICANT CHANGE UP (ref 7–23)
CA-I BLD-SCNC: 0.83 MMOL/L — LOW (ref 1.03–1.23)
CALCIUM SERPL-MCNC: 7.1 MG/DL — LOW (ref 8.4–10.5)
CALCIUM SERPL-MCNC: 7.4 MG/DL — LOW (ref 8.4–10.5)
CHLORIDE SERPL-SCNC: 91 MMOL/L — LOW (ref 98–107)
CHLORIDE SERPL-SCNC: 91 MMOL/L — LOW (ref 98–107)
CO2 SERPL-SCNC: 31 MMOL/L — SIGNIFICANT CHANGE UP (ref 22–31)
CO2 SERPL-SCNC: 31 MMOL/L — SIGNIFICANT CHANGE UP (ref 22–31)
CREAT SERPL-MCNC: 0.91 MG/DL — SIGNIFICANT CHANGE UP (ref 0.5–1.3)
CREAT SERPL-MCNC: 0.93 MG/DL — SIGNIFICANT CHANGE UP (ref 0.5–1.3)
EOSINOPHIL # BLD AUTO: 0.12 K/UL — SIGNIFICANT CHANGE UP (ref 0–0.5)
EOSINOPHIL NFR BLD AUTO: 1.8 % — SIGNIFICANT CHANGE UP (ref 0–6)
GAS PNL BLDV: 131 MMOL/L — LOW (ref 136–146)
GLUCOSE BLDV-MCNC: 175 — HIGH (ref 70–99)
GLUCOSE SERPL-MCNC: 145 MG/DL — HIGH (ref 70–99)
GLUCOSE SERPL-MCNC: 197 MG/DL — HIGH (ref 70–99)
HCO3 BLDV-SCNC: 31 MMOL/L — HIGH (ref 20–27)
HCO3 BLDV-SCNC: 31 MMOL/L — HIGH (ref 20–27)
HCT VFR BLD CALC: 35.2 % — SIGNIFICANT CHANGE UP (ref 34.5–45)
HCT VFR BLDV CALC: 38.4 % — SIGNIFICANT CHANGE UP (ref 34.5–45)
HGB BLD-MCNC: 11.5 G/DL — SIGNIFICANT CHANGE UP (ref 11.5–15.5)
HGB BLDV-MCNC: 12.5 G/DL — SIGNIFICANT CHANGE UP (ref 11.5–15.5)
IMM GRANULOCYTES # BLD AUTO: 0.08 # — SIGNIFICANT CHANGE UP
IMM GRANULOCYTES NFR BLD AUTO: 1.2 % — SIGNIFICANT CHANGE UP (ref 0–1.5)
LYMPHOCYTES # BLD AUTO: 1.11 K/UL — SIGNIFICANT CHANGE UP (ref 1–3.3)
LYMPHOCYTES # BLD AUTO: 16.8 % — SIGNIFICANT CHANGE UP (ref 13–44)
MAGNESIUM SERPL-MCNC: 1.7 MG/DL — SIGNIFICANT CHANGE UP (ref 1.6–2.6)
MAGNESIUM SERPL-MCNC: 1.8 MG/DL — SIGNIFICANT CHANGE UP (ref 1.6–2.6)
MCHC RBC-ENTMCNC: 23 PG — LOW (ref 27–34)
MCHC RBC-ENTMCNC: 32.7 % — SIGNIFICANT CHANGE UP (ref 32–36)
MCV RBC AUTO: 70.4 FL — LOW (ref 80–100)
MONOCYTES # BLD AUTO: 0.7 K/UL — SIGNIFICANT CHANGE UP (ref 0–0.9)
MONOCYTES NFR BLD AUTO: 10.6 % — SIGNIFICANT CHANGE UP (ref 2–14)
NEUTROPHILS # BLD AUTO: 4.51 K/UL — SIGNIFICANT CHANGE UP (ref 1.8–7.4)
NEUTROPHILS NFR BLD AUTO: 68.4 % — SIGNIFICANT CHANGE UP (ref 43–77)
NRBC # FLD: 0 — SIGNIFICANT CHANGE UP
PCO2 BLDV: 46 MMHG — SIGNIFICANT CHANGE UP (ref 41–51)
PCO2 BLDV: 48 MMHG — SIGNIFICANT CHANGE UP (ref 41–51)
PH BLDV: 7.45 PH — HIGH (ref 7.32–7.43)
PH BLDV: 7.46 PH — HIGH (ref 7.32–7.43)
PHOSPHATE SERPL-MCNC: 2.5 MG/DL — SIGNIFICANT CHANGE UP (ref 2.5–4.5)
PHOSPHATE SERPL-MCNC: 2.8 MG/DL — SIGNIFICANT CHANGE UP (ref 2.5–4.5)
PLATELET # BLD AUTO: 156 K/UL — SIGNIFICANT CHANGE UP (ref 150–400)
PMV BLD: SIGNIFICANT CHANGE UP FL (ref 7–13)
PO2 BLDV: 31 MMHG — LOW (ref 35–40)
PO2 BLDV: 34 MMHG — LOW (ref 35–40)
POTASSIUM BLDV-SCNC: 3.7 MMOL/L — SIGNIFICANT CHANGE UP (ref 3.4–4.5)
POTASSIUM SERPL-MCNC: 3.7 MMOL/L — SIGNIFICANT CHANGE UP (ref 3.5–5.3)
POTASSIUM SERPL-MCNC: 3.9 MMOL/L — SIGNIFICANT CHANGE UP (ref 3.5–5.3)
POTASSIUM SERPL-SCNC: 3.7 MMOL/L — SIGNIFICANT CHANGE UP (ref 3.5–5.3)
POTASSIUM SERPL-SCNC: 3.9 MMOL/L — SIGNIFICANT CHANGE UP (ref 3.5–5.3)
PROT SERPL-MCNC: 6.7 G/DL — SIGNIFICANT CHANGE UP (ref 6–8.3)
RBC # BLD: 5 M/UL — SIGNIFICANT CHANGE UP (ref 3.8–5.2)
RBC # FLD: 24.2 % — HIGH (ref 10.3–14.5)
SAO2 % BLDV: 59.3 % — LOW (ref 60–85)
SAO2 % BLDV: 65.4 % — SIGNIFICANT CHANGE UP (ref 60–85)
SODIUM SERPL-SCNC: 132 MMOL/L — LOW (ref 135–145)
SODIUM SERPL-SCNC: 133 MMOL/L — LOW (ref 135–145)
WBC # BLD: 6.6 K/UL — SIGNIFICANT CHANGE UP (ref 3.8–10.5)
WBC # FLD AUTO: 6.6 K/UL — SIGNIFICANT CHANGE UP (ref 3.8–10.5)

## 2018-05-30 PROCEDURE — 99233 SBSQ HOSP IP/OBS HIGH 50: CPT

## 2018-05-30 RX ORDER — BUMETANIDE 0.25 MG/ML
1 INJECTION INTRAMUSCULAR; INTRAVENOUS ONCE
Qty: 0 | Refills: 0 | Status: COMPLETED | OUTPATIENT
Start: 2018-05-30 | End: 2018-05-30

## 2018-05-30 RX ORDER — CALCITRIOL 0.5 UG/1
0.5 CAPSULE ORAL AT BEDTIME
Qty: 0 | Refills: 0 | Status: DISCONTINUED | OUTPATIENT
Start: 2018-05-30 | End: 2018-06-01

## 2018-05-30 RX ORDER — VALSARTAN 80 MG/1
80 TABLET ORAL EVERY 12 HOURS
Qty: 0 | Refills: 0 | Status: DISCONTINUED | OUTPATIENT
Start: 2018-05-30 | End: 2018-06-01

## 2018-05-30 RX ORDER — MAGNESIUM SULFATE 500 MG/ML
1 VIAL (ML) INJECTION ONCE
Qty: 0 | Refills: 0 | Status: COMPLETED | OUTPATIENT
Start: 2018-05-30 | End: 2018-05-30

## 2018-05-30 RX ORDER — DOBUTAMINE HCL 250MG/20ML
1 VIAL (ML) INTRAVENOUS
Qty: 500 | Refills: 0 | Status: DISCONTINUED | OUTPATIENT
Start: 2018-05-30 | End: 2018-05-30

## 2018-05-30 RX ORDER — POTASSIUM CHLORIDE 20 MEQ
40 PACKET (EA) ORAL ONCE
Qty: 0 | Refills: 0 | Status: COMPLETED | OUTPATIENT
Start: 2018-05-30 | End: 2018-05-30

## 2018-05-30 RX ORDER — MAGNESIUM SULFATE 500 MG/ML
2 VIAL (ML) INJECTION ONCE
Qty: 0 | Refills: 0 | Status: COMPLETED | OUTPATIENT
Start: 2018-05-30 | End: 2018-05-30

## 2018-05-30 RX ORDER — CALCIUM GLUCONATE 100 MG/ML
2 VIAL (ML) INTRAVENOUS ONCE
Qty: 0 | Refills: 0 | Status: COMPLETED | OUTPATIENT
Start: 2018-05-30 | End: 2018-05-30

## 2018-05-30 RX ORDER — BUMETANIDE 0.25 MG/ML
1 INJECTION INTRAMUSCULAR; INTRAVENOUS
Qty: 0 | Refills: 0 | Status: DISCONTINUED | OUTPATIENT
Start: 2018-05-30 | End: 2018-06-01

## 2018-05-30 RX ADMIN — Medication 100 GRAM(S): at 16:41

## 2018-05-30 RX ADMIN — Medication 3.41 MICROGRAM(S)/KG/MIN: at 03:36

## 2018-05-30 RX ADMIN — Medication 650 MILLIGRAM(S): at 04:10

## 2018-05-30 RX ADMIN — FAMOTIDINE 20 MILLIGRAM(S): 10 INJECTION INTRAVENOUS at 17:42

## 2018-05-30 RX ADMIN — BUMETANIDE 1 MILLIGRAM(S): 0.25 INJECTION INTRAMUSCULAR; INTRAVENOUS at 22:59

## 2018-05-30 RX ADMIN — Medication 667 MILLIGRAM(S): at 11:55

## 2018-05-30 RX ADMIN — Medication 175 MICROGRAM(S): at 06:42

## 2018-05-30 RX ADMIN — CALCITRIOL 0.5 MICROGRAM(S): 0.5 CAPSULE ORAL at 22:59

## 2018-05-30 RX ADMIN — SODIUM CHLORIDE 3 MILLILITER(S): 9 INJECTION INTRAMUSCULAR; INTRAVENOUS; SUBCUTANEOUS at 16:35

## 2018-05-30 RX ADMIN — Medication 650 MILLIGRAM(S): at 03:36

## 2018-05-30 RX ADMIN — Medication 40 MILLIEQUIVALENT(S): at 16:41

## 2018-05-30 RX ADMIN — Medication 650 MILLIGRAM(S): at 11:16

## 2018-05-30 RX ADMIN — GABAPENTIN 100 MILLIGRAM(S): 400 CAPSULE ORAL at 14:48

## 2018-05-30 RX ADMIN — CHLORHEXIDINE GLUCONATE 1 APPLICATION(S): 213 SOLUTION TOPICAL at 11:55

## 2018-05-30 RX ADMIN — Medication 50 MILLIGRAM(S): at 22:14

## 2018-05-30 RX ADMIN — Medication 1 TABLET(S): at 17:42

## 2018-05-30 RX ADMIN — BUMETANIDE 1 MILLIGRAM(S): 0.25 INJECTION INTRAMUSCULAR; INTRAVENOUS at 14:48

## 2018-05-30 RX ADMIN — Medication 200 GRAM(S): at 07:43

## 2018-05-30 RX ADMIN — Medication 50 MILLIGRAM(S): at 06:42

## 2018-05-30 RX ADMIN — SODIUM CHLORIDE 3 MILLILITER(S): 9 INJECTION INTRAMUSCULAR; INTRAVENOUS; SUBCUTANEOUS at 22:08

## 2018-05-30 RX ADMIN — VALSARTAN 40 MILLIGRAM(S): 80 TABLET ORAL at 08:15

## 2018-05-30 RX ADMIN — Medication 1 TABLET(S): at 06:40

## 2018-05-30 RX ADMIN — BUMETANIDE 1 MILLIGRAM(S): 0.25 INJECTION INTRAMUSCULAR; INTRAVENOUS at 07:43

## 2018-05-30 RX ADMIN — Medication 4: at 17:52

## 2018-05-30 RX ADMIN — HEPARIN SODIUM 5000 UNIT(S): 5000 INJECTION INTRAVENOUS; SUBCUTANEOUS at 06:42

## 2018-05-30 RX ADMIN — VALSARTAN 80 MILLIGRAM(S): 80 TABLET ORAL at 17:43

## 2018-05-30 RX ADMIN — Medication 667 MILLIGRAM(S): at 07:46

## 2018-05-30 RX ADMIN — HEPARIN SODIUM 5000 UNIT(S): 5000 INJECTION INTRAVENOUS; SUBCUTANEOUS at 17:42

## 2018-05-30 RX ADMIN — Medication 50 GRAM(S): at 03:37

## 2018-05-30 RX ADMIN — Medication 650 MILLIGRAM(S): at 09:50

## 2018-05-30 RX ADMIN — GABAPENTIN 100 MILLIGRAM(S): 400 CAPSULE ORAL at 06:41

## 2018-05-30 RX ADMIN — Medication 50 MILLIGRAM(S): at 14:48

## 2018-05-30 RX ADMIN — MAGNESIUM OXIDE 400 MG ORAL TABLET 400 MILLIGRAM(S): 241.3 TABLET ORAL at 07:46

## 2018-05-30 RX ADMIN — MAGNESIUM OXIDE 400 MG ORAL TABLET 400 MILLIGRAM(S): 241.3 TABLET ORAL at 17:43

## 2018-05-30 RX ADMIN — Medication 81 MILLIGRAM(S): at 11:55

## 2018-05-30 RX ADMIN — FAMOTIDINE 20 MILLIGRAM(S): 10 INJECTION INTRAVENOUS at 06:41

## 2018-05-30 RX ADMIN — Medication 1 TABLET(S): at 11:55

## 2018-05-30 RX ADMIN — Medication 2: at 12:29

## 2018-05-30 RX ADMIN — SODIUM CHLORIDE 3 MILLILITER(S): 9 INJECTION INTRAMUSCULAR; INTRAVENOUS; SUBCUTANEOUS at 05:10

## 2018-05-30 RX ADMIN — GABAPENTIN 100 MILLIGRAM(S): 400 CAPSULE ORAL at 22:14

## 2018-05-30 RX ADMIN — Medication 1000 UNIT(S): at 12:29

## 2018-05-30 NOTE — PROGRESS NOTE ADULT - SUBJECTIVE AND OBJECTIVE BOX
Chief complaint  Patient is a 62y old  Female who presents with a chief complaint of Body swelling x 4 weeks (23 May 2018 19:57)   Review of systems  Patient in bed, looks comfortable, no fever, no hypoglycemia.    Labs and Fingersticks  CAPILLARY BLOOD GLUCOSE      POCT Blood Glucose.: 164 mg/dL (30 May 2018 12:25)  POCT Blood Glucose.: 125 mg/dL (30 May 2018 08:10)  POCT Blood Glucose.: 168 mg/dL (29 May 2018 22:11)  POCT Blood Glucose.: 154 mg/dL (29 May 2018 18:07)          Calcium, Total Serum: 7.4 <L> (05-30 @ 15:14)  Calcium, Total Serum: 7.1 <L> (05-30 @ 02:30)  Calcium, Total Serum: 7.2 <L> (05-29 @ 04:45)  Calcium, Total Serum: 7.2 <L> (05-28 @ 20:30)  Albumin, Serum: 2.9 <L> (05-30 @ 02:30)  Albumin, Serum: 2.8 <L> (05-29 @ 04:45)    Alanine Aminotransferase (ALT/SGPT): 44 <H> (05-30 @ 02:30)  Alanine Aminotransferase (ALT/SGPT): 38 <H> (05-29 @ 04:45)  Alkaline Phosphatase, Serum: 251 <H> (05-30 @ 02:30)  Alkaline Phosphatase, Serum: 224 <H> (05-29 @ 04:45)  Aspartate Aminotransferase (AST/SGOT): 55 <H> (05-30 @ 02:30)  Aspartate Aminotransferase (AST/SGOT): 40 <H> (05-29 @ 04:45)        05-30    133<L>  |  91<L>  |  12  ----------------------------<  197<H>  3.7   |  31  |  0.93    Ca    7.4<L>      30 May 2018 15:14  Phos  2.8     05-30  Mg     1.8     05-30    TPro  6.7  /  Alb  2.9<L>  /  TBili  3.6<H>  /  DBili  x   /  AST  55<H>  /  ALT  44<H>  /  AlkPhos  251<H>  05-30                        11.5   6.60  )-----------( 156      ( 30 May 2018 02:30 )             35.2     Medications  MEDICATIONS  (STANDING):  aspirin enteric coated 81 milliGRAM(s) Oral daily  calcitriol   Capsule 0.5 MICROGram(s) Oral at bedtime  calcium carbonate 500 mG (Tums) Chewable 1 Tablet(s) Chew four times a day  chlorhexidine 4% Liquid 1 Application(s) Topical <User Schedule>  cholecalciferol 1000 Unit(s) Oral daily  dextrose 5%. 1000 milliLiter(s) (50 mL/Hr) IV Continuous <Continuous>  dextrose 50% Injectable 12.5 Gram(s) IV Push once  dextrose 50% Injectable 25 Gram(s) IV Push once  dextrose 50% Injectable 25 Gram(s) IV Push once  famotidine    Tablet 20 milliGRAM(s) Oral two times a day  gabapentin 100 milliGRAM(s) Oral every 8 hours  heparin  Injectable 5000 Unit(s) SubCutaneous every 12 hours  hydrALAZINE 50 milliGRAM(s) Oral three times a day  insulin lispro (HumaLOG) corrective regimen sliding scale   SubCutaneous three times a day before meals  insulin lispro (HumaLOG) corrective regimen sliding scale   SubCutaneous at bedtime  levothyroxine 175 MICROGram(s) Oral daily  magnesium oxide 400 milliGRAM(s) Oral two times a day with meals  sodium chloride 0.9% lock flush 3 milliLiter(s) IV Push every 8 hours  valsartan 80 milliGRAM(s) Oral every 12 hours      Physical Exam  General: Patient comfortable in bed  Vital Signs Last 12 Hrs  T(F): 98.2 (05-30-18 @ 16:00), Max: 98.4 (05-30-18 @ 07:00)  HR: 102 (05-30-18 @ 16:00) (90 - 108)  BP: 112/80 (05-30-18 @ 16:00) (100/74 - 129/84)  BP(mean): 88 (05-30-18 @ 16:00) (77 - 91)  RR: 15 (05-30-18 @ 16:00) (11 - 22)  SpO2: 100% (05-30-18 @ 16:00) (100% - 100%)  Neck: No palpable thyroid nodules.  CVS: S1S2, No murmurs  Respiratory: No wheezing, no crepitations  GI: Abdomen soft, bowel sounds positive  Musculoskeletal:  edema lower extremities.   Skin: No skin rashes, no ecchymosis    Diagnostics

## 2018-05-30 NOTE — PROGRESS NOTE ADULT - SUBJECTIVE AND OBJECTIVE BOX
Patient seen and examined. She was sleeping and did not want to talk much. Said she was comfortable. CVP done at bedside w/ nurse, elevated at 13. Giving bumex 1 mg po x 1 now.     Medications:  acetaminophen   Tablet. 650 milliGRAM(s) Oral every 6 hours PRN  aspirin enteric coated 81 milliGRAM(s) Oral daily  calcitriol   Capsule 0.25 MICROGram(s) Oral at bedtime  calcium acetate 667 milliGRAM(s) Oral three times a day with meals  calcium carbonate 500 mG (Tums) Chewable 1 Tablet(s) Chew four times a day  chlorhexidine 4% Liquid 1 Application(s) Topical <User Schedule>  cholecalciferol 1000 Unit(s) Oral daily  dextrose 40% Gel 15 Gram(s) Oral once PRN  dextrose 5%. 1000 milliLiter(s) IV Continuous <Continuous>  dextrose 50% Injectable 12.5 Gram(s) IV Push once  dextrose 50% Injectable 25 Gram(s) IV Push once  dextrose 50% Injectable 25 Gram(s) IV Push once  DOBUTamine Infusion 1 MICROgram(s)/kG/Min IV Continuous <Continuous>  famotidine    Tablet 20 milliGRAM(s) Oral two times a day  gabapentin 100 milliGRAM(s) Oral every 8 hours  glucagon  Injectable 1 milliGRAM(s) IntraMuscular once PRN  heparin  Injectable 5000 Unit(s) SubCutaneous every 12 hours  hydrALAZINE 50 milliGRAM(s) Oral three times a day  insulin lispro (HumaLOG) corrective regimen sliding scale   SubCutaneous three times a day before meals  insulin lispro (HumaLOG) corrective regimen sliding scale   SubCutaneous at bedtime  levothyroxine 175 MICROGram(s) Oral daily  magnesium oxide 400 milliGRAM(s) Oral two times a day with meals  sodium chloride 0.9% lock flush 3 milliLiter(s) IV Push every 8 hours  valsartan 40 milliGRAM(s) Oral two times a day      Vitals:  Vital Signs Last 24 Hrs  T(C): 36.6 (30 May 2018 00:00), Max: 37.1 (29 May 2018 20:00)  T(F): 97.8 (30 May 2018 00:00), Max: 98.7 (29 May 2018 20:00)  HR: 98 (30 May 2018 06:00) (89 - 115)  BP: 107/70 (30 May 2018 06:00) (99/46 - 129/84)  BP(mean): 79 (30 May 2018 06:00) (51 - 101)  RR: 18 (30 May 2018 06:00) (10 - 30)  SpO2: 100% (30 May 2018 06:00) (91% - 100%)    Daily     Daily Weight in k.8 (30 May 2018 05:00)    I&O's Detail    29 May 2018 07:01  -  30 May 2018 07:00  --------------------------------------------------------  IN:    DOBUTamine Infusion: 51 mL    DOBUTamine Infusion: 42.5 mL    DOBUTamine Infusion: 13.6 mL    DOBUTamine Infusion: 6.8 mL    Oral Fluid: 420 mL  Total IN: 533.9 mL    OUT:    Indwelling Catheter - Urethral: 1805 mL  Total OUT: 1805 mL    Total NET: -1271.1 mL            Physical Exam:     General: No distress. Comfortable.  HEENT: EOM intact.  Neck: Neck supple. JVP mildly elevated. No masses  Chest: Clear to auscultation bilaterally  CV: Normal S1 and S2. No murmurs, rub, or gallops. Radial pulses normal. No LE edema b/l.  Abdomen: Soft, non-distended, non-tender  Skin: No rashes or skin breakdown  Neurology: Alert and oriented times three. Sensation intact  Psych: Affect normal    Labs:                        11.5   6.60  )-----------( 156      ( 30 May 2018 02:30 )             35.2     05-30    132<L>  |  91<L>  |  15  ----------------------------<  145<H>  3.9   |  31  |  0.91    Ca    7.1<L>      30 May 2018 02:30  Phos  2.5     05-30  Mg     1.7     30    TPro  6.7  /  Alb  2.9<L>  /  TBili  3.6<H>  /  DBili  x   /  AST  55<H>  /  ALT  44<H>  /  AlkPhos  251<H>  30    < from: TTE with Doppler (w/Cont) (04.26.18 @ 20:04) >  DIMENSIONS:  Dimensions:     Normal Values:  LA:     4.7 cm    2.0 - 4.0 cm  Ao:     3.3 cm    2.0 - 3.8 cm  SEPTUM: 0.9 cm    0.6 - 1.2 cm  PWT:    0.8 cm    0.6 - 1.1 cm  LVIDd:  6.3 cm    3.0 - 5.6 cm  LVIDs:  6.0 cm    1.8 -4.0 cm  Derived Variables:  LVMI: 98 g/m2  RWT: 0.25  Fractional short: 5 %  Ejection Fraction (Teicholtz): 11 %  ------------------------------------------------------------------------  OBSERVATIONS:  Mitral Valve: Mitral annular calcification, otherwise  normal mitral valve. Moderate mitral regurgitation.  Aortic Root: Normal aortic root.  Aortic Valve: Calcified trileaflet aortic valve with  decreased opening.  Left Atrium: Moderately dilated left atrium.  LA volume  index = 47 cc/m2.  Left Ventricle: Severe global left ventricular systolic  dysfunction.  Endocardial visualization enhanced with  intravenous injection of echo contrast (Definity).  No LV  thrombus seen. Mild left ventricular enlargement.  Right Heart: Moderate right atrial enlargement. Right  ventricular enlargement with decreased right ventricular  systolic function.  A device wire is noted in the right  heart. Normal tricuspid valve.  Severe tricuspid  regurgitation. Normal pulmonic valve.  Mild pulmonic  regurgitation.  Pericardium/PleuraNormal pericardium with no pericardial  effusion.  Hemodynamic: Estimated right ventricular systolic pressure  equals 42 mm Hg, assuming right atrial pressure equals 10  mm Hg, consistent with mild pulmonary hypertension.    < end of copied text >

## 2018-05-30 NOTE — PROGRESS NOTE ADULT - SUBJECTIVE AND OBJECTIVE BOX
Subjective: No chest pain or sob       acetaminophen   Tablet. 650 milliGRAM(s) Oral every 6 hours PRN  aspirin enteric coated 81 milliGRAM(s) Oral daily  calcitriol   Capsule 0.25 MICROGram(s) Oral at bedtime  calcium carbonate 500 mG (Tums) Chewable 1 Tablet(s) Chew four times a day  chlorhexidine 4% Liquid 1 Application(s) Topical <User Schedule>  cholecalciferol 1000 Unit(s) Oral daily  dextrose 40% Gel 15 Gram(s) Oral once PRN  dextrose 5%. 1000 milliLiter(s) IV Continuous <Continuous>  dextrose 50% Injectable 12.5 Gram(s) IV Push once  dextrose 50% Injectable 25 Gram(s) IV Push once  dextrose 50% Injectable 25 Gram(s) IV Push once  famotidine    Tablet 20 milliGRAM(s) Oral two times a day  gabapentin 100 milliGRAM(s) Oral every 8 hours  glucagon  Injectable 1 milliGRAM(s) IntraMuscular once PRN  heparin  Injectable 5000 Unit(s) SubCutaneous every 12 hours  hydrALAZINE 50 milliGRAM(s) Oral three times a day  insulin lispro (HumaLOG) corrective regimen sliding scale   SubCutaneous three times a day before meals  insulin lispro (HumaLOG) corrective regimen sliding scale   SubCutaneous at bedtime  levothyroxine 175 MICROGram(s) Oral daily  magnesium oxide 400 milliGRAM(s) Oral two times a day with meals  sodium chloride 0.9% lock flush 3 milliLiter(s) IV Push every 8 hours  valsartan 40 milliGRAM(s) Oral two times a day                            11.5   6.60  )-----------( 156      ( 30 May 2018 02:30 )             35.2       05-30    132<L>  |  91<L>  |  15  ----------------------------<  145<H>  3.9   |  31  |  0.91    Ca    7.1<L>      30 May 2018 02:30  Phos  2.5     05-30  Mg     1.7     05-30    TPro  6.7  /  Alb  2.9<L>  /  TBili  3.6<H>  /  DBili  x   /  AST  55<H>  /  ALT  44<H>  /  AlkPhos  251<H>  05-30            T(C): 36.9 (05-30-18 @ 07:00), Max: 37.1 (05-29-18 @ 20:00)  HR: 90 (05-30-18 @ 12:00) (90 - 115)  BP: 109/74 (05-30-18 @ 12:00) (99/46 - 129/84)  RR: 11 (05-30-18 @ 12:00) (11 - 30)  SpO2: 100% (05-30-18 @ 12:00) (91% - 100%)  Wt(kg): --    I&O's Summary    29 May 2018 07:01  -  30 May 2018 07:00  --------------------------------------------------------  IN: 533.9 mL / OUT: 1805 mL / NET: -1271.1 mL    30 May 2018 07:01  -  30 May 2018 13:33  --------------------------------------------------------  IN: 250.2 mL / OUT: 950 mL / NET: -699.8 mL        Heart: normal S1, S2, RRR, no m/r/g  Lungs: cta bilaterally  Abd: soft nT, nD  Ext: no edema     TELEMETRY: 	    ECG:  	  RADIOLOGY:   < from: Xray Chest 1 View- PORTABLE-Urgent (05.26.18 @ 10:32) >  INTERPRETATION:     May 26 at 7:15 AM:  Perry-Radha catheter seen with its tip in the right interlobar artery   slightly advanced since the last study.    Lungs are clear, heart is enlarged and no effusion or pneumothorax.    9:39 AM:  Tip of the Perry-Radha catheter has been pulled back so it is now in the   right main pulmonary artery segment. Left-sided AICD unchanged, stable   cardiomegaly and clear lungs.      COMPARISON:  May 25      IMPRESSION:  Follow-up studies post Perry-Radha catheter placement and     < end of copied text >    DIAGNOSTIC TESTING:  [ ] Echocardiogram: < from: TTE with Doppler (w/Cont) (04.26.18 @ 20:04) >  CONCLUSIONS:  1. Mitral annular calcification, otherwise normal mitral  valve. Moderate mitral regurgitation.  2. Moderately dilated left atrium.  LA volume index = 47  cc/m2.  3. Mild left ventricular enlargement.  4. Severe global left ventricular systolic dysfunction.  Endocardial visualization enhanced with intravenous  injection of echo contrast (Definity).  No LV thrombus  seen.  5. Right ventricular enlargement with decreased right  ventricular systolic function.  A device wire is noted in  the right heart.  6. Estimated right ventricular systolic pressure equals 42  mm Hg, assuming right atrial pressure equals 10 mm Hg,  consistent with mild pulmonary hypertension.  7.Normal tricuspid valve.  Severe tricuspid regurgitation.  *** Compared with echocardiogram of 5/16/2017, left  ventricular systolic function has deteriorated.    < end of copied text >    [ ]  Catheterization:  < from: Cardiac Cath Lab (01.08.14 @ 16:49) >  CORONARY VESSELS: The coronary circulation is co-dominant.  LM:   -- LM: Normal.  LAD:   --  LAD: Normal.  CX:   --  Circumflex: Normal.  RCA:   --  RCA: Normal.  COMPLICATIONS: There were no complications.    < end of copied text >    [ ] Stress Test:    OTHER: 	      ASSESSMENT/PLAN: 	62y Female with severe NICM s/p AICD, HTN, DM admitted with acute on chronic systolic heart failure.     -Pt. volume status improved  -now off bumex gtt and dobutmaine  -monitor swan numbers  -follow up heart failure  -continue with supportive care per CCU  -discussed with CCU staff    Kunal Muller MD

## 2018-05-30 NOTE — PROGRESS NOTE ADULT - ASSESSMENT
Assessment  Hypothyroidism:  On synthroid 150 mcg po daily, asymptomatic, tsh not at target considering hx of thyroid cancer.  CHF: On medications, stable, monitored.  HTN: Controlled, On med.  DMT2: On insulin, sugars in acceptable range.

## 2018-05-30 NOTE — PROGRESS NOTE ADULT - SUBJECTIVE AND OBJECTIVE BOX
HPI/INTERVAL HISTORY:  Patient seen and examined at bedside.    OBJECTIVE:  VITAL SIGNS:  ICU Vital Signs Last 24 Hrs  T(C): 36.9 (30 May 2018 07:00), Max: 37.1 (29 May 2018 20:00)  T(F): 98.4 (30 May 2018 07:00), Max: 98.7 (29 May 2018 20:00)  HR: 91 (30 May 2018 11:00) (89 - 115)  BP: 113/75 (30 May 2018 11:00) (99/46 - 129/84)  BP(mean): 83 (30 May 2018 11:00) (51 - 101)  ABP: --  ABP(mean): --  RR: 12 (30 May 2018 11:00) (12 - 30)  SpO2: 100% (30 May 2018 11:00) (91% - 100%)        05-29 @ 07:01  -  05-30 @ 07:00  --------------------------------------------------------  IN: 533.9 mL / OUT: 1805 mL / NET: -1271.1 mL    05-30 @ 07:01  -  05-30 @ 11:57  --------------------------------------------------------  IN: 250.2 mL / OUT: 850 mL / NET: -599.8 mL      CAPILLARY BLOOD GLUCOSE      POCT Blood Glucose.: 125 mg/dL (30 May 2018 08:10)      Gen: NAD, pleasant, conversant  HEENT: NC/AT; PERRL, anicteric sclera  Neck: supple, JVD estimated 11 cm.   Resp: Mild left > right basilar crackles. Otherwise CTA.  Cardiovasc: S1S2 normal; RRR; no murmurs, rubs or gallops  GI: soft, nondistended, nontender; +BS  Extr: 2+ PPE to the proximal anterior tibia b/l. palpable DP pulses present b/l.  Skin: Changes 2/2 PVD b/l LE.  Neuro: Non-focal    LABS:                        11.5   6.60  )-----------( 156      ( 30 May 2018 02:30 )             35.2     05-30    132<L>  |  91<L>  |  15  ----------------------------<  145<H>  3.9   |  31  |  0.91    Ca    7.1<L>      30 May 2018 02:30  Phos  2.5     05-30  Mg     1.7     05-30    TPro  6.7  /  Alb  2.9<L>  /  TBili  3.6<H>  /  DBili  x   /  AST  55<H>  /  ALT  44<H>  /  AlkPhos  251<H>  05-30    LIVER FUNCTIONS - ( 30 May 2018 02:30 )  Alb: 2.9 g/dL / Pro: 6.7 g/dL / ALK PHOS: 251 u/L / ALT: 44 u/L / AST: 55 u/L / GGT: x                     RADIOLOGY & ADDITIONAL TESTS: Reviewed.    acetaminophen   Tablet. 650 milliGRAM(s) Oral every 6 hours PRN  aspirin enteric coated 81 milliGRAM(s) Oral daily  calcitriol   Capsule 0.25 MICROGram(s) Oral at bedtime  calcium acetate 667 milliGRAM(s) Oral three times a day with meals  calcium carbonate 500 mG (Tums) Chewable 1 Tablet(s) Chew four times a day  chlorhexidine 4% Liquid 1 Application(s) Topical <User Schedule>  cholecalciferol 1000 Unit(s) Oral daily  dextrose 40% Gel 15 Gram(s) Oral once PRN  dextrose 5%. 1000 milliLiter(s) IV Continuous <Continuous>  dextrose 50% Injectable 12.5 Gram(s) IV Push once  dextrose 50% Injectable 25 Gram(s) IV Push once  dextrose 50% Injectable 25 Gram(s) IV Push once  famotidine    Tablet 20 milliGRAM(s) Oral two times a day  gabapentin 100 milliGRAM(s) Oral every 8 hours  glucagon  Injectable 1 milliGRAM(s) IntraMuscular once PRN  heparin  Injectable 5000 Unit(s) SubCutaneous every 12 hours  hydrALAZINE 50 milliGRAM(s) Oral three times a day  insulin lispro (HumaLOG) corrective regimen sliding scale   SubCutaneous three times a day before meals  insulin lispro (HumaLOG) corrective regimen sliding scale   SubCutaneous at bedtime  levothyroxine 175 MICROGram(s) Oral daily  magnesium oxide 400 milliGRAM(s) Oral two times a day with meals  sodium chloride 0.9% lock flush 3 milliLiter(s) IV Push every 8 hours  valsartan 40 milliGRAM(s) Oral two times a day      Isordil (Headache)  penicillin (Rash) HPI/INTERVAL HISTORY:  Patient seen and examined at bedside.    Foot and ankle pain improved on the gabapentin. Persistent dry cough and hoarse voice. Denies shortness of breath, chest pain, chest pressure, abdominal pain, constipation, diarrhea.    OBJECTIVE:  VITAL SIGNS:  ICU Vital Signs Last 24 Hrs  T(C): 36.9 (30 May 2018 07:00), Max: 37.1 (29 May 2018 20:00)  T(F): 98.4 (30 May 2018 07:00), Max: 98.7 (29 May 2018 20:00)  HR: 91 (30 May 2018 11:00) (89 - 115)  BP: 113/75 (30 May 2018 11:00) (99/46 - 129/84)  BP(mean): 83 (30 May 2018 11:00) (51 - 101)  ABP: --  ABP(mean): --  RR: 12 (30 May 2018 11:00) (12 - 30)  SpO2: 100% (30 May 2018 11:00) (91% - 100%)        05-29 @ 07:01  -  05-30 @ 07:00  --------------------------------------------------------  IN: 533.9 mL / OUT: 1805 mL / NET: -1271.1 mL    05-30 @ 07:01 - 05-30 @ 11:57  --------------------------------------------------------  IN: 250.2 mL / OUT: 850 mL / NET: -599.8 mL      CAPILLARY BLOOD GLUCOSE      POCT Blood Glucose.: 125 mg/dL (30 May 2018 08:10)      Gen: NAD, pleasant, conversant  HEENT: NC/AT; PERRL, anicteric sclera  Neck: supple, JVD estimated 11 cm.   Resp: CTA b/l.  Cardiovasc: S1S2 normal; RRR; no murmurs, rubs or gallops  GI: soft, nondistended, nontender; +BS  Extr: 2+ PPE to the proximal anterior tibia b/l. palpable DP pulses present b/l.  Skin: Changes 2/2 PVD b/l LE.  Neuro: Non-focal    LABS:                        11.5   6.60  )-----------( 156      ( 30 May 2018 02:30 )             35.2     05-30    132<L>  |  91<L>  |  15  ----------------------------<  145<H>  3.9   |  31  |  0.91    Ca    7.1<L>      30 May 2018 02:30  Phos  2.5     05-30  Mg     1.7     05-30    TPro  6.7  /  Alb  2.9<L>  /  TBili  3.6<H>  /  DBili  x   /  AST  55<H>  /  ALT  44<H>  /  AlkPhos  251<H>  05-30    LIVER FUNCTIONS - ( 30 May 2018 02:30 )  Alb: 2.9 g/dL / Pro: 6.7 g/dL / ALK PHOS: 251 u/L / ALT: 44 u/L / AST: 55 u/L / GGT: x                     RADIOLOGY & ADDITIONAL TESTS: Reviewed.    acetaminophen   Tablet. 650 milliGRAM(s) Oral every 6 hours PRN  aspirin enteric coated 81 milliGRAM(s) Oral daily  calcitriol   Capsule 0.25 MICROGram(s) Oral at bedtime  calcium acetate 667 milliGRAM(s) Oral three times a day with meals  calcium carbonate 500 mG (Tums) Chewable 1 Tablet(s) Chew four times a day  chlorhexidine 4% Liquid 1 Application(s) Topical <User Schedule>  cholecalciferol 1000 Unit(s) Oral daily  dextrose 40% Gel 15 Gram(s) Oral once PRN  dextrose 5%. 1000 milliLiter(s) IV Continuous <Continuous>  dextrose 50% Injectable 12.5 Gram(s) IV Push once  dextrose 50% Injectable 25 Gram(s) IV Push once  dextrose 50% Injectable 25 Gram(s) IV Push once  famotidine    Tablet 20 milliGRAM(s) Oral two times a day  gabapentin 100 milliGRAM(s) Oral every 8 hours  glucagon  Injectable 1 milliGRAM(s) IntraMuscular once PRN  heparin  Injectable 5000 Unit(s) SubCutaneous every 12 hours  hydrALAZINE 50 milliGRAM(s) Oral three times a day  insulin lispro (HumaLOG) corrective regimen sliding scale   SubCutaneous three times a day before meals  insulin lispro (HumaLOG) corrective regimen sliding scale   SubCutaneous at bedtime  levothyroxine 175 MICROGram(s) Oral daily  magnesium oxide 400 milliGRAM(s) Oral two times a day with meals  sodium chloride 0.9% lock flush 3 milliLiter(s) IV Push every 8 hours  valsartan 40 milliGRAM(s) Oral two times a day      Isordil (Headache)  penicillin (Rash)

## 2018-05-30 NOTE — PROGRESS NOTE ADULT - SUBJECTIVE AND OBJECTIVE BOX
Nephrology Progress Note    No acute events overnight  Patient seen and evaluated this afternoon; no new complaints, improving sob      PHYSICAL EXAM    Vital Signs Last 24 Hrs  T(C): 36.9 (30 May 2018 07:00), Max: 37.1 (29 May 2018 20:00)  T(F): 98.4 (30 May 2018 07:00), Max: 98.7 (29 May 2018 20:00)  HR: 90 (30 May 2018 12:00) (90 - 115)  BP: 109/74 (30 May 2018 12:00) (99/46 - 129/84)  BP(mean): 83 (30 May 2018 12:00) (51 - 101)  RR: 11 (30 May 2018 12:00) (11 - 30)  SpO2: 100% (30 May 2018 12:00) (91% - 100%)  I&O's Summary    29 May 2018 07:01  -  30 May 2018 07:00  --------------------------------------------------------  IN: 533.9 mL / OUT: 1805 mL / NET: -1271.1 mL    30 May 2018 07:01  -  30 May 2018 13:32  --------------------------------------------------------  IN: 250.2 mL / OUT: 950 mL / NET: -699.8 mL      GA: awake and alert, no distress  EYES: EOMI, clear conj, anicteric sclera  ENT: MMM; clear OP, neck supple  LUNGS: CTABL, no wrc, normal effort  HEART: S1S2 normal, no mrg, no peripheral edema  ABD: soft, NT/ND/BS+, no peritoneal signs  EXT: well perfused, no edema or cyanosis  NEURO: AAO x 3, sensation and motor intact  SKIN: warm and dry, no rash on visible skin    LABORATORY DATA:                        11.5   6.60  )-----------( 156      ( 30 May 2018 02:30 )             35.2     05-30    132<L>  |  91<L>  |  15  ----------------------------<  145<H>  3.9   |  31  |  0.91    Ca    7.1<L>      30 May 2018 02:30  Phos  2.5     05-30  Mg     1.7     05-30    TPro  6.7  /  Alb  2.9<L>  /  TBili  3.6<H>  /  DBili  x   /  AST  55<H>  /  ALT  44<H>  /  AlkPhos  251<H>  05-30    LIVER FUNCTIONS - ( 30 May 2018 02:30 )  Alb: 2.9 g/dL / Pro: 6.7 g/dL / ALK PHOS: 251 u/L / ALT: 44 u/L / AST: 55 u/L / GGT: x             IMAGING:  ASSESSMENT: This is a 62 year old women with PMHx of HTN, HLD, chronic RBBB, obesity, thyroid CA s/p thyroidectomy, hypocalcemia due to hypoparathyroidisms  nonischemic CM with an ejection fraction of 11% and  normal coronaries on left heart cardiac catheterization 2014 , s/p AICD admitted with chronic systolic HF exacerbation, s/p RHC revealing low CI, treated with dobutamine drip which is discontinued today.     1. Hypocalcemia and Hyperphosphatemia due to hypoparathyroidism after thryoid surgery  2. Mild hyponatremia likely due to diuresis  3. Hypoalbuminemia likely due to chronic disease; HF  4. Hypervolemia due to HF exacerbation on dobutamine drip which was discontinued; doing better  5. HTN: BP acceptable    RECOMMENDATIONS:  - continue calcium supplement;   - continue calcitriol; increase to 0.5 mg daily  - hold phoslo as phos on the lower side  - continue bumex per cardiology/HF team  - continue valsartan and hydralazine  - BMP daily; mag and phos tomorrow  - food rich in calcium  - continue to monitor ins and outs, daily weight; limit fluid intake to less than 1.2 liters a day    Rest of management per primary team    Larisa Lay MD  [facility]  [phone]

## 2018-05-30 NOTE — PROGRESS NOTE ADULT - SUBJECTIVE AND OBJECTIVE BOX
Patient with no anginal chest pain; dyspnea improved    Clinically improving  diuresing well -  negative ~ 1 Liter today       MEDICATIONS  (STANDING):  aspirin enteric coated 81 milliGRAM(s) Oral daily  calcitriol   Capsule 0.5 MICROGram(s) Oral at bedtime  calcium carbonate 500 mG (Tums) Chewable 1 Tablet(s) Chew four times a day  chlorhexidine 4% Liquid 1 Application(s) Topical <User Schedule>  cholecalciferol 1000 Unit(s) Oral daily  famotidine    Tablet 20 milliGRAM(s) Oral two times a day  gabapentin 100 milliGRAM(s) Oral every 8 hours  heparin  Injectable 5000 Unit(s) SubCutaneous every 12 hours  hydrALAZINE 50 milliGRAM(s) Oral three times a day  insulin lispro (HumaLOG) corrective regimen sliding scale   SubCutaneous three times a day before meals  insulin lispro (HumaLOG) corrective regimen sliding scale   SubCutaneous at bedtime  levothyroxine 175 MICROGram(s) Oral daily  magnesium oxide 400 milliGRAM(s) Oral two times a day with meals  sodium chloride 0.9% lock flush 3 milliLiter(s) IV Push every 8 hours  valsartan 80 milliGRAM(s) Oral every 12 hours    MEDICATIONS  (PRN):  acetaminophen   Tablet. 650 milliGRAM(s) Oral every 6 hours PRN Moderate Pain (4 - 6)  dextrose 40% Gel 15 Gram(s) Oral once PRN Blood Glucose LESS THAN 70 milliGRAM(s)/deciliter  glucagon  Injectable 1 milliGRAM(s) IntraMuscular once PRN Glucose LESS THAN 70 milligrams/deciliter      LABS:                        11.5   6.60  )-----------( 156      ( 30 May 2018 02:30 )             35.2     Hemoglobin: 11.5 g/dL ( @ 02:30)  Hemoglobin: 11.2 g/dL ( @ 04:45)  Hemoglobin: 10.8 g/dL ( @ 06:01)  Hemoglobin: 11.2 g/dL ( @ 06:23)  Hemoglobin: 11.2 g/dL ( @ 12:30)        133<L>  |  91<L>  |  12  ----------------------------<  197<H>  3.7   |  31  |  0.93    Ca    7.4<L>      30 May 2018 15:14  Phos  2.8     05-30  Mg     1.8     05-30    TPro  6.7  /  Alb  2.9<L>  /  TBili  3.6<H>  /  DBili  x   /  AST  55<H>  /  ALT  44<H>  /  AlkPhos  251<H>  05-30    Creatinine Trend: 0.93<--, 0.91<--, 0.89<--, 0.99<--, 0.97<--, 1.11<--           PHYSICAL EXAM  Vital Signs Last 24 Hrs  T(C): 36.9 (30 May 2018 07:00), Max: 37.1 (29 May 2018 20:00)  T(F): 98.4 (30 May 2018 07:00), Max: 98.7 (29 May 2018 20:00)  HR: 93 (30 May 2018 15:00) (90 - 115)  BP: 100/74 (30 May 2018 15:00) (99/46 - 129/84)  BP(mean): 81 (30 May 2018 15:00) (51 - 94)  RR: 14 (30 May 2018 15:00) (11 - 30)  SpO2: 100% (30 May 2018 15:00) (91% - 100%)      HEENT:   Normal oral mucosa, PERRL, EOMI	  Lymphatic: No obvious lymphadenopathy , (+) edema decreaseing  Cardiovascular: Normal S1 S2, No JVD, 1/6 ROSALINDA murmur, Peripheral pulses palpable 2+ bilaterally  Respiratory: Lungs clear to auscultation, normal effort 	  Gastrointestinal:  Soft, Non-tender, + BS	  Skin: No rashes,  No cyanosis, warm to touch  Musculoskeletal: Normal range of motion, normal strength  Psychiatry:  Appropriate Mood & affect     TELEMETRY: Vpaced	       < from: TTE with Doppler (w/Cont) (18 @ 20:04) >  CONCLUSIONS:  1. Mitral annular calcification, otherwise normal mitral  valve. Moderate mitral regurgitation.  2. Moderately dilated left atrium.  LA volume index = 47  cc/m2.  3. Mild left ventricular enlargement.  4. Severe global left ventricular systolic dysfunction.  Endocardial visualization enhanced with intravenous  injection of echo contrast (Definity).  No LV thrombus  seen.  5. Right ventricular enlargement with decreased right  ventricular systolic function.  A device wire is noted in  the right heart.  6. Estimated right ventricular systolic pressure equals 42  mm Hg, assuming right atrial pressure equals 10 mm Hg,  consistent with mild pulmonary hypertension.  7.Normal tricuspid valve.  Severe tricuspid regurgitation.  *** Compared with echocardiogram of 2017, left  ventricular systolic function has deteriorated.  ------------------------------------------------------------------------  Confirmed on  2018 - 13:50:18 by Xander Tyson M.D.    < end of copied text >      ASSESSMENT/PLAN:   62 year old Female with past medical history of hypertension, hyperlipidemia, chronic RBBB, obesity, thyroid CA s/p thyroidectomy, hypocalcemia (managed by OP endo Dr murray), nonischemic cardiomyopathy with an ejection fraction of 11% and  normal coronaries on left heart cardiac catheterization  , s/p [employer] Cl[employer] MRI CRT-ICD placement in 2017, admitted last month with acute on chronic systolic HF exacerbation, re admitted with acute on chronic systolic HF exacerbation, s/p RHC revealing low CI, trx to CCU for 	    - heart failure noted - Dobutamine discontinued  - Bumex dosed 1mg PO at 7am and 3pm today given elevated CVP (13)   - diuresing well - continuing to monitor  strict I+Os  - hypocalcemia, Endo/renal  f/u cont to replete  - CCU care appreciated     Johanne Boyce RPA-C

## 2018-05-30 NOTE — PROGRESS NOTE ADULT - ASSESSMENT
Ms. Carson is a 62 year old woman with acute on chronic systolic heart failure due to a nonischemic cardiomyopathy with a severely dilated left ventricle and severely decreased LV ejection fraction currently in cardiogenic shock requiring IV inotropic support. Her abdominal pain is likely reflective of her low cardiac output. She is urinating well on the current support. She has had multiple hospitalizations and is at high risk for further decline and death. Dr. Mckeon offered transfer to [facility] for evaluation for advanced therapies, but the family would like to wait to be transferred. She has responded well to dobutamine and bumex gtt. Dobutamine was turned off on 5/29/18, but was restarted early Am on 5/30/18 due to low PA sat. CVP on 5/30/18 was still elevated at 12, Bumex 1 mg po qd was given again.

## 2018-05-30 NOTE — PROGRESS NOTE ADULT - PROBLEM SELECTOR PLAN 4
Continue synthroid.    ppx: HSQ    Dispo: wean dobutamine per HF recommendations.     Tim Genao MD  [facility]   Pager # [phone]/ [phone]

## 2018-05-30 NOTE — PROGRESS NOTE ADULT - SUBJECTIVE AND OBJECTIVE BOX
INTERVAL HPI/OVERNIGHT EVENTS:    pt sleeping this morning/arousable  "i am fine id like to sleep"    MEDICATIONS  (STANDING):  aspirin enteric coated 81 milliGRAM(s) Oral daily  calcitriol   Capsule 0.25 MICROGram(s) Oral at bedtime  calcium acetate 667 milliGRAM(s) Oral three times a day with meals  calcium carbonate 500 mG (Tums) Chewable 1 Tablet(s) Chew four times a day  chlorhexidine 4% Liquid 1 Application(s) Topical <User Schedule>  cholecalciferol 1000 Unit(s) Oral daily  dextrose 5%. 1000 milliLiter(s) (50 mL/Hr) IV Continuous <Continuous>  dextrose 50% Injectable 12.5 Gram(s) IV Push once  dextrose 50% Injectable 25 Gram(s) IV Push once  dextrose 50% Injectable 25 Gram(s) IV Push once  famotidine    Tablet 20 milliGRAM(s) Oral two times a day  gabapentin 100 milliGRAM(s) Oral every 8 hours  heparin  Injectable 5000 Unit(s) SubCutaneous every 12 hours  hydrALAZINE 50 milliGRAM(s) Oral three times a day  insulin lispro (HumaLOG) corrective regimen sliding scale   SubCutaneous three times a day before meals  insulin lispro (HumaLOG) corrective regimen sliding scale   SubCutaneous at bedtime  levothyroxine 175 MICROGram(s) Oral daily  magnesium oxide 400 milliGRAM(s) Oral two times a day with meals  sodium chloride 0.9% lock flush 3 milliLiter(s) IV Push every 8 hours  valsartan 40 milliGRAM(s) Oral two times a day    MEDICATIONS  (PRN):  acetaminophen   Tablet. 650 milliGRAM(s) Oral every 6 hours PRN Moderate Pain (4 - 6)  dextrose 40% Gel 15 Gram(s) Oral once PRN Blood Glucose LESS THAN 70 milliGRAM(s)/deciliter  glucagon  Injectable 1 milliGRAM(s) IntraMuscular once PRN Glucose LESS THAN 70 milligrams/deciliter      Allergies    Isordil (Headache)  penicillin (Rash)    Intolerances        Review of Systems:    General:  No wt loss, fevers, chills, night sweats, fatigue   Eyes:  Good vision, no reported pain  ENT:  No sore throat, pain, runny nose, dysphagia  CV:  No pain, palpitations, hypo/hypertension  Resp:  No dyspnea, cough, tachypnea, wheezing  GI:  No pain, No nausea, No vomiting, No diarrhea, No constipation, No weight loss, No fever, No pruritis, No rectal bleeding, No melena, No dysphagia  :  No pain, bleeding, incontinence, nocturia  Muscle:  No pain, weakness  Neuro:  No weakness, tingling, memory problems  Psych:  No fatigue, insomnia, mood problems, depression  Endocrine:  No polyuria, polydypsia, cold/heat intolerance  Heme:  No petechiae, ecchymosis, easy bruisability  Skin:  No rash, tattoos, scars, edema      Vital Signs Last 24 Hrs  T(C): 36.9 (30 May 2018 07:00), Max: 37.1 (29 May 2018 20:00)  T(F): 98.4 (30 May 2018 07:00), Max: 98.7 (29 May 2018 20:00)  HR: 90 (30 May 2018 12:00) (90 - 115)  BP: 109/74 (30 May 2018 12:00) (99/46 - 129/84)  BP(mean): 83 (30 May 2018 12:00) (51 - 101)  RR: 11 (30 May 2018 12:00) (11 - 30)  SpO2: 100% (30 May 2018 12:00) (91% - 100%)    PHYSICAL EXAM:    Constitutional: NAD  HEENT: EOMI, throat clear  Neck: No LAD, supple  Respiratory: CTA and P  Cardiovascular: S1 and S2, RRR, no M  Gastrointestinal: BS+, soft, NT/ND, neg HSM,  Extremities: No peripheral edema, neg clubbing, cyanosis  Vascular: 2+ peripheral pulses  Neurological: A/O x 3, no focal deficits  Psychiatric: Normal mood, normal affect  Skin: No rashes      LABS:                        11.5   6.60  )-----------( 156      ( 30 May 2018 02:30 )             35.2     05-30    132<L>  |  91<L>  |  15  ----------------------------<  145<H>  3.9   |  31  |  0.91    Ca    7.1<L>      30 May 2018 02:30  Phos  2.5     05-30  Mg     1.7     05-30    TPro  6.7  /  Alb  2.9<L>  /  TBili  3.6<H>  /  DBili  x   /  AST  55<H>  /  ALT  44<H>  /  AlkPhos  251<H>  05-30          RADIOLOGY & ADDITIONAL TESTS:

## 2018-05-30 NOTE — PROGRESS NOTE ADULT - PROBLEM SELECTOR PLAN 3
- now resolved  - pain control prn  - pt reports pain was associated with Bumex intake; monitor for further pain while giving bumex

## 2018-05-30 NOTE — PROGRESS NOTE ADULT - PROBLEM SELECTOR PLAN 2
- Endocrinology consult Dr. Alva today. Ca improved. Hypocalcemia in the setting of thyroidectomy for thyroid CA and resultant primary hypoparathyroidism. (procedure in 2000)  - Repeat Vit D25 wnl, PTH borderline low normal.  - Patient is asymptomatic, QTc unreliable in the setting of pacemaker. Aggressive repletion to corrected total serum Ca 7.5 in the setting of loop diuresis.  - Appreciate HF opinion on electrolyte induced cardiomyopathy?  - Appreciate renal input.   - Consult endocrine this AM. - Dr. Alva following.  - Repeat Vit D25 wnl, PTH borderline low normal.  - Patient is asymptomatic, QTc unreliable in the setting of pacemaker. Aggressive repletion to corrected total serum Ca 7.5 in the setting of loop diuresis.

## 2018-05-30 NOTE — PROGRESS NOTE ADULT - PROBLEM SELECTOR PLAN 1
- Dobutamine decreased at 10 AM to 2.5 mg/hr.  - JACKSON CO= 6.25; CI= 2.99. CVP 7, calculated .  - Appreciate HF recs. - Dobutamine was stopped at 8 PM on 5/29.  - JACKSON calculated at 2:30 AM - CO= 4.708; CI= 2.27. CVP 7, calculated .  - Appreciate HF recs. - Dobutamine was stopped at 8 PM on 5/29.  - JACKSON calculated at 2:30 AM - CO= 4.708; CI= 2.27. CVP 16.  - Dobutamine was restarted at 4 AM. JACKSON pending.  - Appreciate HF recs.

## 2018-05-30 NOTE — PROGRESS NOTE ADULT - PROBLEM SELECTOR PLAN 1
-Dobutamine was turned off on 18 evening, but was restarted early AM on 18 due to low PA sat. CVP on 18 AM was still elevated at 12, Bumex 1 mg po qd was given again.   -Continue Dobutamine 1mcg/kg/min.   -Renal function stable BUN/Cr 15/0.91.  SBP stable 107/70.  -Hemodynamics today: CVP 12, PA sat 59%, PA 47/29/35.   -Continue hydral 50 mg po TID, Isordil 10 TID, valsartan 40 mg po BID.  Off BB while on . -Dobutamine was turned off on 18 evening, but was restarted early AM on 18 due to low PA sat. CVP on 18 AM was still elevated at 12, Bumex 1 mg po qd was given again.   -Continue Dobutamine 1mcg/kg/min.   -Renal function stable BUN/Cr 15/0.91.  SBP stable 107/70.  -Hemodynamics today: CVP 12, PA sat 59%, PA 47/29/35.   -Continue hydral 50 mg po TID. Did not get valsartan last night due to low BP. Will try today, hold only if SBP <90. Off BB while on .   -Repeat hemodynamics and labs 4 hours post valsartan dose.

## 2018-05-30 NOTE — PROGRESS NOTE ADULT - ASSESSMENT
62F with severe NICM, s/p MDT AICD (EF 11%), HTN, DM, history of thyroid CA, and obesity who was admitted with acute on chronic systolic heart failure and transferred to the CCU for further management of cardiogenic shock with inotrope assisted diuresis, now showing slow improvement. 62F with severe NICM, s/p MDT AICD (EF 11%), HTN, DM, history of thyroid CA, and obesity who was admitted with acute on chronic systolic heart failure and transferred to the CCU for further management of cardiogenic shock with inotrope assisted diuresis, now showing slow improvement, weaned from dobutamine as of 5/30 and continuing with bumex assisted diuresis.

## 2018-05-30 NOTE — PROGRESS NOTE ADULT - SUBJECTIVE AND OBJECTIVE BOX
EP ATTENDING    tele: NSR, Biv-pacing, brief episode of NSVT     no palpitations, no syncope, no angina, less dyspnea, no ICD shocks    acetaminophen   Tablet. 650 milliGRAM(s) Oral every 6 hours PRN  aspirin enteric coated 81 milliGRAM(s) Oral daily  calcitriol   Capsule 0.25 MICROGram(s) Oral at bedtime  calcium acetate 667 milliGRAM(s) Oral three times a day with meals  calcium carbonate 500 mG (Tums) Chewable 1 Tablet(s) Chew four times a day  chlorhexidine 4% Liquid 1 Application(s) Topical <User Schedule>  cholecalciferol 1000 Unit(s) Oral daily  dextrose 40% Gel 15 Gram(s) Oral once PRN  dextrose 5%. 1000 milliLiter(s) IV Continuous <Continuous>  dextrose 50% Injectable 12.5 Gram(s) IV Push once  dextrose 50% Injectable 25 Gram(s) IV Push once  dextrose 50% Injectable 25 Gram(s) IV Push once  famotidine    Tablet 20 milliGRAM(s) Oral two times a day  gabapentin 100 milliGRAM(s) Oral every 8 hours  glucagon  Injectable 1 milliGRAM(s) IntraMuscular once PRN  heparin  Injectable 5000 Unit(s) SubCutaneous every 12 hours  hydrALAZINE 50 milliGRAM(s) Oral three times a day  insulin lispro (HumaLOG) corrective regimen sliding scale   SubCutaneous three times a day before meals  insulin lispro (HumaLOG) corrective regimen sliding scale   SubCutaneous at bedtime  levothyroxine 175 MICROGram(s) Oral daily  magnesium oxide 400 milliGRAM(s) Oral two times a day with meals  sodium chloride 0.9% lock flush 3 milliLiter(s) IV Push every 8 hours  valsartan 40 milliGRAM(s) Oral two times a day                            11.5   6.60  )-----------( 156      ( 30 May 2018 02:30 )             35.2       05-30    132<L>  |  91<L>  |  15  ----------------------------<  145<H>  3.9   |  31  |  0.91    Ca    7.1<L>      30 May 2018 02:30  Phos  2.5     05-30  Mg     1.7     05-30    TPro  6.7  /  Alb  2.9<L>  /  TBili  3.6<H>  /  DBili  x   /  AST  55<H>  /  ALT  44<H>  /  AlkPhos  251<H>  05-30      T(C): 36.9 (05-30-18 @ 07:00), Max: 37.1 (05-29-18 @ 20:00)  HR: 90 (05-30-18 @ 12:00) (90 - 115)  BP: 109/74 (05-30-18 @ 12:00) (99/46 - 129/84)  RR: 11 (05-30-18 @ 12:00) (11 - 30)  SpO2: 100% (05-30-18 @ 12:00) (91% - 100%)  Wt(kg): --      CVP 17  RRR, no murmurs  CTAB  soft nt/nd  no c/c/e      A/P) 63 y/o female PMH hypertension, hyperlipidemia, chronic RBBB, thyroid CA s/p thyroidectomy, nonischemic cardiomyopathy with an ejection fraction of 11% and normal coronaries on left heart cardiac catheterization 2014, s/p [employer] Claria MRI CRT-ICD placement in June 2017 ([place]), admitted with decompensated CHF. EP call for BiV-optimization and NSVT on tele. She denies ICD shocks    -Last interrogation noted 99% BIV pacing with normal function, based on EKG her BIVICD is optimized  -medical management as per cardiology and CHF  -no need for AAD therapy given no ICD shocks because her VT is non-sustained  -no further EP work up needed as long as VT remains non-sustained not requiring ICD shocks  -supportive care as per CCU

## 2018-05-30 NOTE — PROGRESS NOTE ADULT - PROBLEM SELECTOR PLAN 3
Continues to diurese well with 2.8L output over 24 hrs. Wt. down to 98 kg from 107.3 on 5/26. c/w dobutamine as noted. c/w hydralazine 50 mg tid, isordil 10 mg q8hrs. Repleting K and Mg as needed. Per HF recs patient will need eventual evaluation for LVAD/transplant. Continues to diurese well with 2.8L output over 24 hrs. Wt. down to 96.8 kg from 107.3 on . wean  to dc today. c/w hydralazine 50 mg tid. Repleting K and Mg as needed. Per HF recs patient will need eventual evaluation for LVAD/transplant.

## 2018-05-30 NOTE — PROGRESS NOTE ADULT - ASSESSMENT
62F with a past medical history of hypertension, hyperlipidemia, chronic RBBB,  obesity, thyroid CA, nonischemic cardiomyopathy with an ejection fraction of 11% and normal coronaries on recent cardiac catheterization at [facility] with Dr Chavarria , s/p [employer] Sofi MRI CRT-ICD placement in June 2017, admitted last month with acute on chronic systolic HF exacerbation, presenting today with SOB, volume overload, acute on chronic systolic HF exacerbation. Gi called to assess for abdominal pain after taking Bumex and with increased LFTs. Pt known to our service from previous admission in May 2017 for persistent abdominal pain with nausea and found to have transaminitis. During that admission she underwent EGD with esophagitis and duodenopathy and MRCP was also obtained and did not show any evidence of biliary obstruction at that time. Further imaging with CT Abd showed 'Contracted gallbladder with intraluminal stones. Nonspecific gallbladder wall thickening or edema. Appearance is unchanged from the prior recent CT and MRI examinations.

## 2018-05-30 NOTE — PROGRESS NOTE ADULT - SUBJECTIVE AND OBJECTIVE BOX
INTERVAL HPI/OVERNIGHT EVENTS: I feel better .   Vital Signs Last 24 Hrs  T(C): 36.9 (30 May 2018 07:00), Max: 37.1 (29 May 2018 20:00)  T(F): 98.4 (30 May 2018 07:00), Max: 98.7 (29 May 2018 20:00)  HR: 90 (30 May 2018 10:00) (89 - 115)  BP: 114/69 (30 May 2018 10:00) (99/46 - 129/84)  BP(mean): 77 (30 May 2018 10:00) (51 - 101)  RR: 14 (30 May 2018 10:00) (12 - 30)  SpO2: 100% (30 May 2018 10:00) (91% - 100%)  I&O's Summary    29 May 2018 07:01  -  30 May 2018 07:00  --------------------------------------------------------  IN: 533.9 mL / OUT: 1805 mL / NET: -1271.1 mL    30 May 2018 07:01  -  30 May 2018 10:48  --------------------------------------------------------  IN: 246.8 mL / OUT: 600 mL / NET: -353.2 mL      MEDICATIONS  (STANDING):  aspirin enteric coated 81 milliGRAM(s) Oral daily  calcitriol   Capsule 0.25 MICROGram(s) Oral at bedtime  calcium acetate 667 milliGRAM(s) Oral three times a day with meals  calcium carbonate 500 mG (Tums) Chewable 1 Tablet(s) Chew four times a day  chlorhexidine 4% Liquid 1 Application(s) Topical <User Schedule>  cholecalciferol 1000 Unit(s) Oral daily  dextrose 5%. 1000 milliLiter(s) (50 mL/Hr) IV Continuous <Continuous>  dextrose 50% Injectable 12.5 Gram(s) IV Push once  dextrose 50% Injectable 25 Gram(s) IV Push once  dextrose 50% Injectable 25 Gram(s) IV Push once  DOBUTamine Infusion 1 MICROgram(s)/kG/Min (3.411 mL/Hr) IV Continuous <Continuous>  famotidine    Tablet 20 milliGRAM(s) Oral two times a day  gabapentin 100 milliGRAM(s) Oral every 8 hours  heparin  Injectable 5000 Unit(s) SubCutaneous every 12 hours  hydrALAZINE 50 milliGRAM(s) Oral three times a day  insulin lispro (HumaLOG) corrective regimen sliding scale   SubCutaneous three times a day before meals  insulin lispro (HumaLOG) corrective regimen sliding scale   SubCutaneous at bedtime  levothyroxine 175 MICROGram(s) Oral daily  magnesium oxide 400 milliGRAM(s) Oral two times a day with meals  sodium chloride 0.9% lock flush 3 milliLiter(s) IV Push every 8 hours  valsartan 40 milliGRAM(s) Oral two times a day    MEDICATIONS  (PRN):  acetaminophen   Tablet. 650 milliGRAM(s) Oral every 6 hours PRN Moderate Pain (4 - 6)  dextrose 40% Gel 15 Gram(s) Oral once PRN Blood Glucose LESS THAN 70 milliGRAM(s)/deciliter  glucagon  Injectable 1 milliGRAM(s) IntraMuscular once PRN Glucose LESS THAN 70 milligrams/deciliter    LABS:                        11.5   6.60  )-----------( 156      ( 30 May 2018 02:30 )             35.2     05-30    132<L>  |  91<L>  |  15  ----------------------------<  145<H>  3.9   |  31  |  0.91    Ca    7.1<L>      30 May 2018 02:30  Phos  2.5     05-30  Mg     1.7     05-30    TPro  6.7  /  Alb  2.9<L>  /  TBili  3.6<H>  /  DBili  x   /  AST  55<H>  /  ALT  44<H>  /  AlkPhos  251<H>  05-30        CAPILLARY BLOOD GLUCOSE      POCT Blood Glucose.: 125 mg/dL (30 May 2018 08:10)  POCT Blood Glucose.: 168 mg/dL (29 May 2018 22:11)  POCT Blood Glucose.: 154 mg/dL (29 May 2018 18:07)  POCT Blood Glucose.: 182 mg/dL (29 May 2018 12:25)      ABG - ( 29 May 2018 05:30 )  pH, Arterial: 7.53  pH, Blood: x     /  pCO2: 40    /  pO2: 90    / HCO3: 34    / Base Excess: 9.9   /  SaO2: 99.2                REVIEW OF SYSTEMS:  CONSTITUTIONAL: No fever, weight loss, or fatigue  EYES: No eye pain, visual disturbances, or discharge  ENMT:  No difficulty hearing, tinnitus, vertigo; No sinus or throat pain  NECK: No pain or stiffness  BREASTS: No pain, masses, or nipple discharge  RESPIRATORY: No cough, wheezing, chills or hemoptysis; No shortness of breath  CARDIOVASCULAR: No chest pain, palpitations, dizziness, or leg swelling  GASTROINTESTINAL: No abdominal or epigastric pain. No nausea, vomiting, or hematemesis; No diarrhea or constipation. No melena or hematochezia.  GENITOURINARY: No dysuria, frequency, hematuria, or incontinence  NEUROLOGICAL: No headaches, memory loss, loss of strength, numbness, or tremors  SKIN: No itching, burning, rashes, or lesions   LYMPH NODES: No enlarged glands  ENDOCRINE: No heat or cold intolerance; No hair loss  MUSCULOSKELETAL: No joint pain or swelling; No muscle, back, or extremity pain    Consultant(s) Notes Reviewed:  [x ] YES  [ ] NO    PHYSICAL EXAM:  GENERAL: NAD, well-groomed, well-developed ,not in any distress ,  HEAD:  Atraumatic, Normocephalic  EYES: EOMI, PERRLA, conjunctiva and sclera clear  ENMT: No tonsillar erythema, exudates, or enlargement; Moist mucous membranes, Good dentition, No lesions  NECK: Supple, No JVD, Normal thyroid  NERVOUS SYSTEM:  Alert & Oriented X3, No focal deficit   CHEST/LUNG: Good air entry bilateral with no  rales, rhonchi, wheezing, or rubs  HEART: Regular rate and rhythm; No murmurs, rubs, or gallops  ABDOMEN: Soft, Nontender, Nondistended; Bowel sounds present  EXTREMITIES:  2+ Peripheral Pulses, No clubbing, cyanosis, but no edema  SKIN: No rashes or lesions    Care Discussed with Consultants/Other Providers [ x] YES  [ ] NO

## 2018-05-30 NOTE — PROGRESS NOTE ADULT - ASSESSMENT
62yFemale with severe NICM s/p MDT AICD, HTN, DM, history of thyroid CA, obesity admitted with acute on chronic systolic heart failure.      Problem/Plan - 1:  ·  Problem: Acute on chronic systolic congestive heart failure with severe Cardiomyopathy S/P AICD  .  Plan: S/P RHC. Bumex restarted. Diuresing well.  HF team following. .       Problem/Plan - 2:  ·  Problem: Cardiogenic Shock .  Plan: S/P RHC.  Dobutamine restarted .     Problem/Plan - 3:  ·  Problem: Essential hypertension.  Plan: BP readings fine. Low salt diet.  Continue BP meds.      Problem/Plan - 4:  ·  Problem: Diabetes mellitus, type 2.  Plan: Sugars in acceptable range .   FS QID  HISS   DM diet.      Problem/Plan - 5:  ·  Problem: Hypocalcemia .  Plan :Replacing. Endo helping. . Will need IV replacement also.      Problem/Plan -6:  ·  Problem: Abnormal LFT  .  Plan : Secondary to Hepatic congestion from CHF . GI helping.      Problem/Plan -7:  ·  Problem: MAGNUS  .  Plan : Resolved. Renal helping.       Problem/Plan -8:  Problem: Need for prophylactic measure. Plan: VTE with Heparin 5000U sub cut BID.  Fall, Aspirations and safety precautions,

## 2018-05-31 ENCOUNTER — TRANSCRIPTION ENCOUNTER (OUTPATIENT)
Age: 63
End: 2018-05-31

## 2018-05-31 LAB
ALBUMIN SERPL ELPH-MCNC: 3 G/DL — LOW (ref 3.3–5)
ALP SERPL-CCNC: 274 U/L — HIGH (ref 40–120)
ALT FLD-CCNC: 52 U/L — HIGH (ref 4–33)
AST SERPL-CCNC: 68 U/L — HIGH (ref 4–32)
BASE EXCESS BLDV CALC-SCNC: 7 MMOL/L — SIGNIFICANT CHANGE UP
BILIRUB SERPL-MCNC: 3.1 MG/DL — HIGH (ref 0.2–1.2)
BUN SERPL-MCNC: 15 MG/DL — SIGNIFICANT CHANGE UP (ref 7–23)
CALCIUM SERPL-MCNC: 7 MG/DL — LOW (ref 8.4–10.5)
CHLORIDE SERPL-SCNC: 91 MMOL/L — LOW (ref 98–107)
CO2 SERPL-SCNC: 29 MMOL/L — SIGNIFICANT CHANGE UP (ref 22–31)
CREAT SERPL-MCNC: 0.93 MG/DL — SIGNIFICANT CHANGE UP (ref 0.5–1.3)
GAS PNL BLDV: 130 MMOL/L — LOW (ref 136–146)
GLUCOSE BLDV-MCNC: 163 — HIGH (ref 70–99)
GLUCOSE SERPL-MCNC: 156 MG/DL — HIGH (ref 70–99)
HCO3 BLDV-SCNC: 30 MMOL/L — HIGH (ref 20–27)
HCT VFR BLD CALC: 36.8 % — SIGNIFICANT CHANGE UP (ref 34.5–45)
HCT VFR BLDV CALC: 39.2 % — SIGNIFICANT CHANGE UP (ref 34.5–45)
HGB BLD-MCNC: 12 G/DL — SIGNIFICANT CHANGE UP (ref 11.5–15.5)
HGB BLDV-MCNC: 12.7 G/DL — SIGNIFICANT CHANGE UP (ref 11.5–15.5)
MAGNESIUM SERPL-MCNC: 1.8 MG/DL — SIGNIFICANT CHANGE UP (ref 1.6–2.6)
MCHC RBC-ENTMCNC: 23 PG — LOW (ref 27–34)
MCHC RBC-ENTMCNC: 32.6 % — SIGNIFICANT CHANGE UP (ref 32–36)
MCV RBC AUTO: 70.6 FL — LOW (ref 80–100)
NRBC # FLD: 0 — SIGNIFICANT CHANGE UP
PCO2 BLDV: 48 MMHG — SIGNIFICANT CHANGE UP (ref 41–51)
PH BLDV: 7.43 PH — SIGNIFICANT CHANGE UP (ref 7.32–7.43)
PHOSPHATE SERPL-MCNC: 2.8 MG/DL — SIGNIFICANT CHANGE UP (ref 2.5–4.5)
PLATELET # BLD AUTO: 196 K/UL — SIGNIFICANT CHANGE UP (ref 150–400)
PMV BLD: SIGNIFICANT CHANGE UP FL (ref 7–13)
PO2 BLDV: 36 MMHG — SIGNIFICANT CHANGE UP (ref 35–40)
POTASSIUM BLDV-SCNC: 3.8 MMOL/L — SIGNIFICANT CHANGE UP (ref 3.4–4.5)
POTASSIUM SERPL-MCNC: 4 MMOL/L — SIGNIFICANT CHANGE UP (ref 3.5–5.3)
POTASSIUM SERPL-SCNC: 4 MMOL/L — SIGNIFICANT CHANGE UP (ref 3.5–5.3)
PROT SERPL-MCNC: 6.8 G/DL — SIGNIFICANT CHANGE UP (ref 6–8.3)
RBC # BLD: 5.21 M/UL — HIGH (ref 3.8–5.2)
RBC # FLD: 25.1 % — HIGH (ref 10.3–14.5)
SAO2 % BLDV: 68.7 % — SIGNIFICANT CHANGE UP (ref 60–85)
SODIUM SERPL-SCNC: 134 MMOL/L — LOW (ref 135–145)
WBC # BLD: 6.35 K/UL — SIGNIFICANT CHANGE UP (ref 3.8–10.5)
WBC # FLD AUTO: 6.35 K/UL — SIGNIFICANT CHANGE UP (ref 3.8–10.5)

## 2018-05-31 PROCEDURE — 99233 SBSQ HOSP IP/OBS HIGH 50: CPT

## 2018-05-31 RX ORDER — CALCIUM GLUCONATE 100 MG/ML
2 VIAL (ML) INTRAVENOUS ONCE
Qty: 0 | Refills: 0 | Status: COMPLETED | OUTPATIENT
Start: 2018-05-31 | End: 2018-05-31

## 2018-05-31 RX ORDER — MAGNESIUM SULFATE 500 MG/ML
2 VIAL (ML) INJECTION ONCE
Qty: 0 | Refills: 0 | Status: COMPLETED | OUTPATIENT
Start: 2018-05-31 | End: 2018-05-31

## 2018-05-31 RX ADMIN — Medication 200 GRAM(S): at 07:47

## 2018-05-31 RX ADMIN — Medication 81 MILLIGRAM(S): at 12:07

## 2018-05-31 RX ADMIN — Medication 1 TABLET(S): at 00:40

## 2018-05-31 RX ADMIN — SODIUM CHLORIDE 3 MILLILITER(S): 9 INJECTION INTRAMUSCULAR; INTRAVENOUS; SUBCUTANEOUS at 22:06

## 2018-05-31 RX ADMIN — Medication 50 MILLIGRAM(S): at 07:01

## 2018-05-31 RX ADMIN — FAMOTIDINE 20 MILLIGRAM(S): 10 INJECTION INTRAVENOUS at 18:07

## 2018-05-31 RX ADMIN — HEPARIN SODIUM 5000 UNIT(S): 5000 INJECTION INTRAVENOUS; SUBCUTANEOUS at 06:12

## 2018-05-31 RX ADMIN — SODIUM CHLORIDE 3 MILLILITER(S): 9 INJECTION INTRAMUSCULAR; INTRAVENOUS; SUBCUTANEOUS at 12:19

## 2018-05-31 RX ADMIN — FAMOTIDINE 20 MILLIGRAM(S): 10 INJECTION INTRAVENOUS at 06:12

## 2018-05-31 RX ADMIN — Medication 50 MILLIGRAM(S): at 22:59

## 2018-05-31 RX ADMIN — Medication 50 MILLIGRAM(S): at 13:55

## 2018-05-31 RX ADMIN — Medication 50 GRAM(S): at 06:11

## 2018-05-31 RX ADMIN — VALSARTAN 80 MILLIGRAM(S): 80 TABLET ORAL at 06:12

## 2018-05-31 RX ADMIN — Medication 175 MICROGRAM(S): at 06:12

## 2018-05-31 RX ADMIN — Medication 1000 UNIT(S): at 12:07

## 2018-05-31 RX ADMIN — GABAPENTIN 100 MILLIGRAM(S): 400 CAPSULE ORAL at 06:12

## 2018-05-31 RX ADMIN — GABAPENTIN 100 MILLIGRAM(S): 400 CAPSULE ORAL at 13:55

## 2018-05-31 RX ADMIN — Medication 1 TABLET(S): at 12:07

## 2018-05-31 RX ADMIN — Medication 2: at 23:00

## 2018-05-31 RX ADMIN — MAGNESIUM OXIDE 400 MG ORAL TABLET 400 MILLIGRAM(S): 241.3 TABLET ORAL at 18:07

## 2018-05-31 RX ADMIN — MAGNESIUM OXIDE 400 MG ORAL TABLET 400 MILLIGRAM(S): 241.3 TABLET ORAL at 12:07

## 2018-05-31 RX ADMIN — BUMETANIDE 1 MILLIGRAM(S): 0.25 INJECTION INTRAMUSCULAR; INTRAVENOUS at 13:56

## 2018-05-31 RX ADMIN — SODIUM CHLORIDE 3 MILLILITER(S): 9 INJECTION INTRAMUSCULAR; INTRAVENOUS; SUBCUTANEOUS at 06:13

## 2018-05-31 RX ADMIN — Medication 1 TABLET(S): at 22:59

## 2018-05-31 RX ADMIN — HEPARIN SODIUM 5000 UNIT(S): 5000 INJECTION INTRAVENOUS; SUBCUTANEOUS at 18:07

## 2018-05-31 RX ADMIN — Medication 650 MILLIGRAM(S): at 00:06

## 2018-05-31 RX ADMIN — CALCITRIOL 0.5 MICROGRAM(S): 0.5 CAPSULE ORAL at 22:59

## 2018-05-31 RX ADMIN — GABAPENTIN 100 MILLIGRAM(S): 400 CAPSULE ORAL at 22:59

## 2018-05-31 RX ADMIN — VALSARTAN 80 MILLIGRAM(S): 80 TABLET ORAL at 18:07

## 2018-05-31 RX ADMIN — CHLORHEXIDINE GLUCONATE 1 APPLICATION(S): 213 SOLUTION TOPICAL at 12:18

## 2018-05-31 RX ADMIN — Medication 1 TABLET(S): at 06:11

## 2018-05-31 RX ADMIN — Medication 650 MILLIGRAM(S): at 06:27

## 2018-05-31 RX ADMIN — BUMETANIDE 1 MILLIGRAM(S): 0.25 INJECTION INTRAMUSCULAR; INTRAVENOUS at 06:13

## 2018-05-31 NOTE — PROGRESS NOTE ADULT - ASSESSMENT
62F with severe NICM, s/p MDT AICD (EF 11%), HTN, DM, history of thyroid CA, and obesity who was admitted with acute on chronic systolic heart failure and transferred to the CCU for further management of cardiogenic shock with inotrope assisted diuresis, now showing slow improvement, weaned from dobutamine as of 5/30 and continuing with bumex assisted diuresis.

## 2018-05-31 NOTE — PROGRESS NOTE ADULT - SUBJECTIVE AND OBJECTIVE BOX
Chief complaint  Patient is a 62y old  Female who presents with a chief complaint of Body swelling x 4 weeks (31 May 2018 13:42)   Review of systems  Patient in bed, looks comfortable, no fever, no hypoglycemia.    Labs and Fingersticks  CAPILLARY BLOOD GLUCOSE      POCT Blood Glucose.: 134 mg/dL (31 May 2018 12:01)  POCT Blood Glucose.: 124 mg/dL (31 May 2018 08:02)  POCT Blood Glucose.: 183 mg/dL (30 May 2018 22:26)  POCT Blood Glucose.: 215 mg/dL (30 May 2018 17:49)          Calcium, Total Serum: 7.0 <L> (05-31 @ 04:30)  Calcium, Total Serum: 7.4 <L> (05-30 @ 15:14)  Calcium, Total Serum: 7.1 <L> (05-30 @ 02:30)  Albumin, Serum: 3.0 <L> (05-31 @ 04:30)  Albumin, Serum: 2.9 <L> (05-30 @ 02:30)    Alanine Aminotransferase (ALT/SGPT): 52 <H> (05-31 @ 04:30)  Alanine Aminotransferase (ALT/SGPT): 44 <H> (05-30 @ 02:30)  Alkaline Phosphatase, Serum: 274 <H> (05-31 @ 04:30)  Alkaline Phosphatase, Serum: 251 <H> (05-30 @ 02:30)  Aspartate Aminotransferase (AST/SGOT): 68 <H> (05-31 @ 04:30)  Aspartate Aminotransferase (AST/SGOT): 55 <H> (05-30 @ 02:30)        05-31    134<L>  |  91<L>  |  15  ----------------------------<  156<H>  4.0   |  29  |  0.93    Ca    7.0<L>      31 May 2018 04:30  Phos  2.8     05-31  Mg     1.8     05-31    TPro  6.8  /  Alb  3.0<L>  /  TBili  3.1<H>  /  DBili  x   /  AST  68<H>  /  ALT  52<H>  /  AlkPhos  274<H>  05-31                        12.0   6.35  )-----------( 196      ( 31 May 2018 04:30 )             36.8     Medications  MEDICATIONS  (STANDING):  aspirin enteric coated 81 milliGRAM(s) Oral daily  buMETAnide 1 milliGRAM(s) Oral <User Schedule>  calcitriol   Capsule 0.5 MICROGram(s) Oral at bedtime  calcium carbonate 1250 mG + Vitamin D (OsCal 500 + D) 1 Tablet(s) Oral three times a day  chlorhexidine 4% Liquid 1 Application(s) Topical <User Schedule>  cholecalciferol 1000 Unit(s) Oral daily  dextrose 5%. 1000 milliLiter(s) (50 mL/Hr) IV Continuous <Continuous>  dextrose 50% Injectable 12.5 Gram(s) IV Push once  dextrose 50% Injectable 25 Gram(s) IV Push once  dextrose 50% Injectable 25 Gram(s) IV Push once  famotidine    Tablet 20 milliGRAM(s) Oral two times a day  gabapentin 100 milliGRAM(s) Oral every 8 hours  heparin  Injectable 5000 Unit(s) SubCutaneous every 12 hours  hydrALAZINE 50 milliGRAM(s) Oral three times a day  insulin lispro (HumaLOG) corrective regimen sliding scale   SubCutaneous three times a day before meals  insulin lispro (HumaLOG) corrective regimen sliding scale   SubCutaneous at bedtime  levothyroxine 175 MICROGram(s) Oral daily  magnesium oxide 400 milliGRAM(s) Oral two times a day with meals  sodium chloride 0.9% lock flush 3 milliLiter(s) IV Push every 8 hours  valsartan 80 milliGRAM(s) Oral every 12 hours      Physical Exam  General: Patient comfortable in bed  Vital Signs Last 12 Hrs  T(F): 97.9 (05-31-18 @ 12:00), Max: 99 (05-31-18 @ 04:00)  HR: 96 (05-31-18 @ 15:00) (88 - 102)  BP: 109/77 (05-31-18 @ 15:00) (93/62 - 112/76)  BP(mean): 84 (05-31-18 @ 15:00) (69 - 88)  RR: 17 (05-31-18 @ 15:00) (10 - 23)  SpO2: 99% (05-31-18 @ 15:00) (96% - 100%)  Neck: No palpable thyroid nodules.  CVS: S1S2, No murmurs  Respiratory: No wheezing, no crepitations  GI: Abdomen soft, bowel sounds positive  Musculoskeletal:  edema lower extremities.   Skin: No skin rashes, no ecchymosis    Diagnostics

## 2018-05-31 NOTE — PROGRESS NOTE ADULT - ASSESSMENT
62F with a past medical history of hypertension, hyperlipidemia, chronic RBBB,  obesity, thyroid CA, nonischemic cardiomyopathy with an ejection fraction of 11% and normal coronaries on recent cardiac catheterization at [facility] with Dr Chavarria , s/p [employer] Sofi MRI CRT-ICD placement in June 2017, admitted last month with acute on chronic systolic HF exacerbation, presenting today with SOB, volume overload, acute on chronic systolic HF exacerbation. Gi called to assess for abdominal pain after taking Bumex and with increased LFTs. Pt known to our service from previous admission in May 2017 for persistent abdominal pain with nausea and found to have transaminitis. During that admission she underwent EGD with esophagitis and duodenopathy and MRCP was also obtained and did not show any evidence of biliary obstruction at that time. Further imaging with CT Abd showed 'Contracted gallbladder with intraluminal stones. Nonspecific gallbladder wall thickening or edema. Appearance is unchanged from the prior recent CT and MRI examinations.

## 2018-05-31 NOTE — DISCHARGE NOTE ADULT - NSTOBACCOHOTLINE_GEN_A_NCS
[facility] Smokers Quitline ([id number]) [facility] Smokers Quitline ([id number]) [facility] Smokers Quitline (820-MX-[id number])

## 2018-05-31 NOTE — PROGRESS NOTE ADULT - SUBJECTIVE AND OBJECTIVE BOX
HPI/INTERVAL HISTORY:  Patient seen and examined at bedside.    OBJECTIVE:  VITAL SIGNS:  ICU Vital Signs Last 24 Hrs  T(C): 36.7 (31 May 2018 07:00), Max: 37.2 (31 May 2018 04:00)  T(F): 98.1 (31 May 2018 07:00), Max: 99 (31 May 2018 04:00)  HR: 90 (31 May 2018 08:00) (90 - 108)  BP: 106/79 (31 May 2018 08:00) (92/64 - 120/83)  BP(mean): 86 (31 May 2018 08:00) (68 - 90)  ABP: --  ABP(mean): --  RR: 18 (31 May 2018 08:00) (11 - 23)  SpO2: 97% (31 May 2018 08:00) (96% - 100%)        05-30 @ 07:01  -  05-31 @ 07:00  --------------------------------------------------------  IN: 590.2 mL / OUT: 3040 mL / NET: -2449.8 mL      CAPILLARY BLOOD GLUCOSE      POCT Blood Glucose.: 124 mg/dL (31 May 2018 08:02)    Gen: NAD, pleasant, conversant  HEENT: NC/AT; PERRL, anicteric sclera  Neck: supple, JVD estimated 11 cm.   Resp: CTA b/l.  Cardiovasc: S1S2 normal; RRR; no murmurs, rubs or gallops  GI: soft, nondistended, nontender; +BS  Extr: 2+ PPE to the proximal anterior tibia b/l. palpable DP pulses present b/l.  Skin: Changes 2/2 PVD b/l LE.  Neuro: Non-focal    LABS:                        12.0   6.35  )-----------( 196      ( 31 May 2018 04:30 )             36.8     05-31    134<L>  |  91<L>  |  15  ----------------------------<  156<H>  4.0   |  29  |  0.93    Ca    7.0<L>      31 May 2018 04:30  Phos  2.8     05-31  Mg     1.8     05-31    TPro  6.8  /  Alb  3.0<L>  /  TBili  3.1<H>  /  DBili  x   /  AST  68<H>  /  ALT  52<H>  /  AlkPhos  274<H>  05-31    LIVER FUNCTIONS - ( 31 May 2018 04:30 )  Alb: 3.0 g/dL / Pro: 6.8 g/dL / ALK PHOS: 274 u/L / ALT: 52 u/L / AST: 68 u/L / GGT: x                     RADIOLOGY & ADDITIONAL TESTS: Reviewed.    acetaminophen   Tablet. 650 milliGRAM(s) Oral every 6 hours PRN  aspirin enteric coated 81 milliGRAM(s) Oral daily  buMETAnide 1 milliGRAM(s) Oral <User Schedule>  calcitriol   Capsule 0.5 MICROGram(s) Oral at bedtime  calcium carbonate 500 mG (Tums) Chewable 1 Tablet(s) Chew four times a day  chlorhexidine 4% Liquid 1 Application(s) Topical <User Schedule>  cholecalciferol 1000 Unit(s) Oral daily  dextrose 40% Gel 15 Gram(s) Oral once PRN  dextrose 5%. 1000 milliLiter(s) IV Continuous <Continuous>  dextrose 50% Injectable 12.5 Gram(s) IV Push once  dextrose 50% Injectable 25 Gram(s) IV Push once  dextrose 50% Injectable 25 Gram(s) IV Push once  famotidine    Tablet 20 milliGRAM(s) Oral two times a day  gabapentin 100 milliGRAM(s) Oral every 8 hours  glucagon  Injectable 1 milliGRAM(s) IntraMuscular once PRN  heparin  Injectable 5000 Unit(s) SubCutaneous every 12 hours  hydrALAZINE 50 milliGRAM(s) Oral three times a day  insulin lispro (HumaLOG) corrective regimen sliding scale   SubCutaneous three times a day before meals  insulin lispro (HumaLOG) corrective regimen sliding scale   SubCutaneous at bedtime  levothyroxine 175 MICROGram(s) Oral daily  magnesium oxide 400 milliGRAM(s) Oral two times a day with meals  sodium chloride 0.9% lock flush 3 milliLiter(s) IV Push every 8 hours  valsartan 80 milliGRAM(s) Oral every 12 hours      Isordil (Headache)  penicillin (Rash) HPI/INTERVAL HISTORY:  Patient seen and examined at bedside.    No events overnight. States she got better sleep last night, walked with PT yesterday and felt good. Received extra Bumex 1 mg PO last night at 10 PM. Started 80 mg Valsartan bid as of last night. Off bumex over 24 hours. Net negative about 2 L over the last 24 hours. CVP (calibrated to 0 at bedside) 12 last night and 15 this AM. Headache improved off isordil.    Denies chest pain, SOB, abdominal pain, palpitations, n/v, diarrhea, light headedness, vision changes.    OBJECTIVE:  VITAL SIGNS:  ICU Vital Signs Last 24 Hrs  T(C): 36.7 (31 May 2018 07:00), Max: 37.2 (31 May 2018 04:00)  T(F): 98.1 (31 May 2018 07:00), Max: 99 (31 May 2018 04:00)  HR: 90 (31 May 2018 08:00) (90 - 108)  BP: 106/79 (31 May 2018 08:00) (92/64 - 120/83)  BP(mean): 86 (31 May 2018 08:00) (68 - 90)  ABP: --  ABP(mean): --  RR: 18 (31 May 2018 08:00) (11 - 23)  SpO2: 97% (31 May 2018 08:00) (96% - 100%)        05-30 @ 07:01  -  05-31 @ 07:00  --------------------------------------------------------  IN: 590.2 mL / OUT: 3040 mL / NET: -2449.8 mL      CAPILLARY BLOOD GLUCOSE      POCT Blood Glucose.: 124 mg/dL (31 May 2018 08:02)    Gen: NAD, pleasant, conversant  HEENT: NC/AT; PERRL, anicteric sclera  Neck: supple, JVD estimated 9-10.   Resp: CTA b/l.  Cardiovasc: S1 S2 normal; RRR; no murmurs, rubs or gallops  GI: soft, nondistended, nontender; +BS  Extr: 2+ PPE to the proximal anterior tibia b/l. palpable DP pulses present b/l.  Skin: Changes 2/2 PVD b/l LE.  Neuro: Non-focal    LABS:                        12.0   6.35  )-----------( 196      ( 31 May 2018 04:30 )             36.8     05-31    134<L>  |  91<L>  |  15  ----------------------------<  156<H>  4.0   |  29  |  0.93    Ca    7.0<L>      31 May 2018 04:30  Phos  2.8     05-31  Mg     1.8     05-31    TPro  6.8  /  Alb  3.0<L>  /  TBili  3.1<H>  /  DBili  x   /  AST  68<H>  /  ALT  52<H>  /  AlkPhos  274<H>  05-31    LIVER FUNCTIONS - ( 31 May 2018 04:30 )  Alb: 3.0 g/dL / Pro: 6.8 g/dL / ALK PHOS: 274 u/L / ALT: 52 u/L / AST: 68 u/L / GGT: x           RADIOLOGY & ADDITIONAL TESTS: Reviewed.    acetaminophen   Tablet. 650 milliGRAM(s) Oral every 6 hours PRN  aspirin enteric coated 81 milliGRAM(s) Oral daily  buMETAnide 1 milliGRAM(s) Oral <User Schedule>  calcitriol   Capsule 0.5 MICROGram(s) Oral at bedtime  calcium carbonate 500 mG (Tums) Chewable 1 Tablet(s) Chew four times a day  chlorhexidine 4% Liquid 1 Application(s) Topical <User Schedule>  cholecalciferol 1000 Unit(s) Oral daily  dextrose 40% Gel 15 Gram(s) Oral once PRN  dextrose 5%. 1000 milliLiter(s) IV Continuous <Continuous>  dextrose 50% Injectable 12.5 Gram(s) IV Push once  dextrose 50% Injectable 25 Gram(s) IV Push once  dextrose 50% Injectable 25 Gram(s) IV Push once  famotidine    Tablet 20 milliGRAM(s) Oral two times a day  gabapentin 100 milliGRAM(s) Oral every 8 hours  glucagon  Injectable 1 milliGRAM(s) IntraMuscular once PRN  heparin  Injectable 5000 Unit(s) SubCutaneous every 12 hours  hydrALAZINE 50 milliGRAM(s) Oral three times a day  insulin lispro (HumaLOG) corrective regimen sliding scale   SubCutaneous three times a day before meals  insulin lispro (HumaLOG) corrective regimen sliding scale   SubCutaneous at bedtime  levothyroxine 175 MICROGram(s) Oral daily  magnesium oxide 400 milliGRAM(s) Oral two times a day with meals  sodium chloride 0.9% lock flush 3 milliLiter(s) IV Push every 8 hours  valsartan 80 milliGRAM(s) Oral every 12 hours      Isordil (Headache)  penicillin (Rash)

## 2018-05-31 NOTE — DISCHARGE NOTE ADULT - CARE PROVIDER_API CALL
Evelyn Robbins  [address]  [address]   [place], [place]   June 4th 1:30 pm  Phone: (   )    -  Fax: (   )    - Javier Gallego (MD; PhD), Adv Heart Fail Trnsplnt Cardio; Cardiovascular Disease; Internal Medicine  [address] [place] [address]  Phone: [phone]  Fax: [phone]    Nicolas Cade (MD), Internal Medicine  [address] [facility] [address]  [place] [address]  Phone: [phone]  Fax: [phone]    Evelyn Robbins  [address]  [address]   [place], [place]   June 4th 1:30 pm  Phone: (   )    -  Fax: (   )    -    Katie Alva), EndocrinologyMetabDiabetes; Internal Medicine  [address] [place] [address]  Phone: [phone]  Fax: [phone]

## 2018-05-31 NOTE — PROGRESS NOTE ADULT - PROBLEM SELECTOR PLAN 3
Continues to diurese well with 2.8L output over 24 hrs. Wt. down to 96.8 kg from 107.3 on . wean  to dc today. c/w hydralazine 50 mg tid. Repleting K and Mg as needed. Per HF recs patient will need eventual evaluation for LVAD/transplant. Continues to diurese well with 2.4L output over 24 hrs. Wt. down to 95.3 kg from 107.3 on 5/26. off dobutamine. c/w hydralazine 50 mg tid. Repleting K and Mg as needed. Per HF recs patient will need eventual evaluation for LVAD/transplant.  - Ensure follow up with HF less than 3 days after discharge.

## 2018-05-31 NOTE — PROGRESS NOTE ADULT - ASSESSMENT
Ms. Carson is a 62 year old woman with acute on chronic systolic heart failure due to a nonischemic cardiomyopathy with a severely dilated left ventricle and severely decreased LV ejection fraction currently in cardiogenic shock requiring IV inotropic support. Her abdominal pain is likely reflective of her low cardiac output. She is urinating well on the current support. She has had multiple hospitalizations and is at high risk for further decline and death. Dr. Mckeon offered transfer to [facility] for evaluation for advanced therapies, but the family would like to wait to be transferred. She has responded well to dobutamine and bumex gtt. Dobutamine was turned off on 5/29/18, but was restarted early Am on 5/30/18 due to low PA sat. CVP on 5/30/18 was still elevated at 12, Bumex 1 mg po qd was given again. 5/31/18 CVP 9, PCWP 16, PA 38/22/28, PA sat 68.7%, CO 6.58, CI 2.93. [place] was removed.

## 2018-05-31 NOTE — PROGRESS NOTE ADULT - ASSESSMENT
62yFemale with severe NICM s/p MDT AICD, HTN, DM, history of thyroid CA, obesity admitted with acute on chronic systolic heart failure.      Problem/Plan - 1:  ·  Problem: Acute on chronic systolic congestive heart failure with severe Cardiomyopathy S/P AICD  .  Plan: S/P RHC. Bumex restarted. Diuresing well.  HF team following. ARB and Hydralazine. .       Problem/Plan - 2:  ·  Problem: Cardiogenic Shock .  Plan: S/P RHC.  Dobutamine stopped.      Problem/Plan - 3:  ·  Problem: Essential hypertension.  Plan: BP readings fine. Low salt diet.  Continue BP meds.      Problem/Plan - 4:  ·  Problem: Diabetes mellitus, type 2.  Plan: Sugars in acceptable range .   FS QID  HISS   DM diet.      Problem/Plan - 5:  ·  Problem: Hypocalcemia .  Plan :Replacing. Endo helping. . Will need IV replacement also.      Problem/Plan -6:  ·  Problem: Abnormal LFT  .  Plan : Secondary to Hepatic congestion from CHF . GI helping.      Problem/Plan -7:  ·  Problem: MAGNUS  .  Plan : Resolved. Renal helping.       Problem/Plan -8:  Problem: Need for prophylactic measure. Plan: VTE with Heparin 5000U sub cut BID.  Fall, Aspirations and safety precautions,

## 2018-05-31 NOTE — PROGRESS NOTE ADULT - SUBJECTIVE AND OBJECTIVE BOX
Patient seen and examined. She feels great. No acute SOB, orthopnea, PND, CP, palpitations, dizziness.   CVP 9. [place] was removed.    Medications:  acetaminophen   Tablet. 650 milliGRAM(s) Oral every 6 hours PRN  aspirin enteric coated 81 milliGRAM(s) Oral daily  buMETAnide 1 milliGRAM(s) Oral <User Schedule>  calcitriol   Capsule 0.5 MICROGram(s) Oral at bedtime  calcium carbonate 500 mG (Tums) Chewable 1 Tablet(s) Chew four times a day  chlorhexidine 4% Liquid 1 Application(s) Topical <User Schedule>  cholecalciferol 1000 Unit(s) Oral daily  dextrose 40% Gel 15 Gram(s) Oral once PRN  dextrose 5%. 1000 milliLiter(s) IV Continuous <Continuous>  dextrose 50% Injectable 12.5 Gram(s) IV Push once  dextrose 50% Injectable 25 Gram(s) IV Push once  dextrose 50% Injectable 25 Gram(s) IV Push once  famotidine    Tablet 20 milliGRAM(s) Oral two times a day  gabapentin 100 milliGRAM(s) Oral every 8 hours  glucagon  Injectable 1 milliGRAM(s) IntraMuscular once PRN  heparin  Injectable 5000 Unit(s) SubCutaneous every 12 hours  hydrALAZINE 50 milliGRAM(s) Oral three times a day  insulin lispro (HumaLOG) corrective regimen sliding scale   SubCutaneous three times a day before meals  insulin lispro (HumaLOG) corrective regimen sliding scale   SubCutaneous at bedtime  levothyroxine 175 MICROGram(s) Oral daily  magnesium oxide 400 milliGRAM(s) Oral two times a day with meals  sodium chloride 0.9% lock flush 3 milliLiter(s) IV Push every 8 hours  valsartan 80 milliGRAM(s) Oral every 12 hours      Vitals:  Vital Signs Last 24 Hrs  T(C): 36.6 (31 May 2018 12:00), Max: 37.2 (31 May 2018 04:00)  T(F): 97.9 (31 May 2018 12:00), Max: 99 (31 May 2018 04:00)  HR: 90 (31 May 2018 12:00) (88 - 107)  BP: 108/75 (31 May 2018 12:00) (92/64 - 112/80)  BP(mean): 83 (31 May 2018 12:00) (68 - 88)  RR: 15 (31 May 2018 12:00) (10 - 23)  SpO2: 100% (31 May 2018 12:00) (96% - 100%)    Daily     Daily Weight in k.3 (31 May 2018 05:00)    I&O's Detail    30 May 2018 07:01  -  31 May 2018 07:00  --------------------------------------------------------  IN:    DOBUTamine Infusion: 10.2 mL    Oral Fluid: 580 mL  Total IN: 590.2 mL    OUT:    Indwelling Catheter - Urethral: 3340 mL  Total OUT: 3340 mL    Total NET: -2749.8 mL      31 May 2018 07:01  -  31 May 2018 13:23  --------------------------------------------------------  IN:    Oral Fluid: 240 mL  Total IN: 240 mL    OUT:    Indwelling Catheter - Urethral: 1450 mL  Total OUT: 1450 mL    Total NET: -1210 mL          Physical Exam:     General: No distress. Comfortable.  HEENT: EOM intact.  Neck: Neck supple. JVP not elevated. No masses  Chest: Clear to auscultation bilaterally  CV: Normal S1 and S2. No murmurs, rub, or gallops. Radial pulses normal. No LE edema b/l.   Abdomen: Soft, non-distended, non-tender  Skin: No rashes or skin breakdown  Neurology: Alert and oriented times three. Sensation intact  Psych: Affect normal    Labs:                        12.0   6.35  )-----------( 196      ( 31 May 2018 04:30 )             36.8     05-31    134<L>  |  91<L>  |  15  ----------------------------<  156<H>  4.0   |  29  |  0.93    Ca    7.0<L>      31 May 2018 04:30  Phos  2.8       Mg     1.8         TPro  6.8  /  Alb  3.0<L>  /  TBili  3.1<H>  /  DBili  x   /  AST  68<H>  /  ALT  52<H>  /  AlkPhos  274<H>

## 2018-05-31 NOTE — PROGRESS NOTE ADULT - SUBJECTIVE AND OBJECTIVE BOX
INTERVAL HPI/OVERNIGHT EVENTS: No new concerns. My breathing better.   Vital Signs Last 24 Hrs  T(C): 36.7 (31 May 2018 07:00), Max: 37.2 (31 May 2018 04:00)  T(F): 98.1 (31 May 2018 07:00), Max: 99 (31 May 2018 04:00)  HR: 88 (31 May 2018 10:00) (88 - 107)  BP: 93/62 (31 May 2018 10:00) (92/64 - 112/80)  BP(mean): 69 (31 May 2018 10:00) (68 - 88)  RR: 10 (31 May 2018 10:00) (10 - 23)  SpO2: 98% (31 May 2018 10:00) (96% - 100%)  I&O's Summary    30 May 2018 07:01  -  31 May 2018 07:00  --------------------------------------------------------  IN: 590.2 mL / OUT: 3340 mL / NET: -2749.8 mL    31 May 2018 07:01  -  31 May 2018 11:51  --------------------------------------------------------  IN: 240 mL / OUT: 1250 mL / NET: -1010 mL      MEDICATIONS  (STANDING):  aspirin enteric coated 81 milliGRAM(s) Oral daily  buMETAnide 1 milliGRAM(s) Oral <User Schedule>  calcitriol   Capsule 0.5 MICROGram(s) Oral at bedtime  calcium carbonate 500 mG (Tums) Chewable 1 Tablet(s) Chew four times a day  chlorhexidine 4% Liquid 1 Application(s) Topical <User Schedule>  cholecalciferol 1000 Unit(s) Oral daily  dextrose 5%. 1000 milliLiter(s) (50 mL/Hr) IV Continuous <Continuous>  dextrose 50% Injectable 12.5 Gram(s) IV Push once  dextrose 50% Injectable 25 Gram(s) IV Push once  dextrose 50% Injectable 25 Gram(s) IV Push once  famotidine    Tablet 20 milliGRAM(s) Oral two times a day  gabapentin 100 milliGRAM(s) Oral every 8 hours  heparin  Injectable 5000 Unit(s) SubCutaneous every 12 hours  hydrALAZINE 50 milliGRAM(s) Oral three times a day  insulin lispro (HumaLOG) corrective regimen sliding scale   SubCutaneous three times a day before meals  insulin lispro (HumaLOG) corrective regimen sliding scale   SubCutaneous at bedtime  levothyroxine 175 MICROGram(s) Oral daily  magnesium oxide 400 milliGRAM(s) Oral two times a day with meals  sodium chloride 0.9% lock flush 3 milliLiter(s) IV Push every 8 hours  valsartan 80 milliGRAM(s) Oral every 12 hours    MEDICATIONS  (PRN):  acetaminophen   Tablet. 650 milliGRAM(s) Oral every 6 hours PRN Moderate Pain (4 - 6)  dextrose 40% Gel 15 Gram(s) Oral once PRN Blood Glucose LESS THAN 70 milliGRAM(s)/deciliter  glucagon  Injectable 1 milliGRAM(s) IntraMuscular once PRN Glucose LESS THAN 70 milligrams/deciliter    LABS:                        12.0   6.35  )-----------( 196      ( 31 May 2018 04:30 )             36.8     05-31    134<L>  |  91<L>  |  15  ----------------------------<  156<H>  4.0   |  29  |  0.93    Ca    7.0<L>      31 May 2018 04:30  Phos  2.8     05-31  Mg     1.8     05-31    TPro  6.8  /  Alb  3.0<L>  /  TBili  3.1<H>  /  DBili  x   /  AST  68<H>  /  ALT  52<H>  /  AlkPhos  274<H>  05-31        CAPILLARY BLOOD GLUCOSE      POCT Blood Glucose.: 124 mg/dL (31 May 2018 08:02)  POCT Blood Glucose.: 183 mg/dL (30 May 2018 22:26)  POCT Blood Glucose.: 215 mg/dL (30 May 2018 17:49)  POCT Blood Glucose.: 164 mg/dL (30 May 2018 12:25)          REVIEW OF SYSTEMS:  CONSTITUTIONAL: No fever, weight loss, or fatigue  EYES: No eye pain, visual disturbances, or discharge  ENMT:  No difficulty hearing, tinnitus, vertigo; No sinus or throat pain  NECK: No pain or stiffness  BREASTS: No pain, masses, or nipple discharge  RESPIRATORY: No cough, wheezing, chills or hemoptysis; No shortness of breath  CARDIOVASCULAR: No chest pain, palpitations, dizziness, or leg swelling  GASTROINTESTINAL: No abdominal or epigastric pain. No nausea, vomiting, or hematemesis; No diarrhea or constipation. No melena or hematochezia.  GENITOURINARY: No dysuria, frequency, hematuria, or incontinence  NEUROLOGICAL: No headaches, memory loss, loss of strength, numbness, or tremors  SKIN: No itching, burning, rashes, or lesions   LYMPH NODES: No enlarged glands  ENDOCRINE: No heat or cold intolerance; No hair loss  MUSCULOSKELETAL: No joint pain or swelling; No muscle, back, or extremity pain  PSYCHIATRIC: No depression, anxiety, mood swings, or difficulty sleeping  HEME/LYMPH: No easy bruising, or bleeding gums    Consultant(s) Notes Reviewed:  [x ] YES  [ ] NO    PHYSICAL EXAM:  GENERAL: NAD, well-groomed, well-developed, not in any distress ,  HEAD:  Atraumatic, Normocephalic  EYES: EOMI, PERRLA, conjunctiva and sclera clear  ENMT: No tonsillar erythema, exudates, or enlargement; Moist mucous membranes, Good dentition, No lesions  NECK: Supple, No JVD, Normal thyroid  NERVOUS SYSTEM:  Alert & Oriented X3, No focal deficit   CHEST/LUNG: Good air entry bilateral with no  rales, rhonchi, wheezing, or rubs  HEART: Regular rate and rhythm; No murmurs, rubs, or gallops  ABDOMEN: Soft, Nontender, Nondistended; Bowel sounds present  EXTREMITIES:  2+ Peripheral Pulses, No clubbing, cyanosis, but no  edema  SKIN: No rashes or lesions    Care Discussed with Consultants/Other Providers [ x] YES  [ ] NO

## 2018-05-31 NOTE — DISCHARGE NOTE ADULT - MEDICATION SUMMARY - MEDICATIONS TO STOP TAKING
I will STOP taking the medications listed below when I get home from the hospital:    carvedilol 3.125 mg oral tablet  -- 1 tab(s) by mouth every 12 hours    torsemide 10 mg oral tablet  -- alternating 2-3 tab(s) by mouth every other day    lisinopril 10 mg oral tablet  -- 1 tab(s) by mouth once a day    colchicine 0.6 mg oral tablet  -- 1 tab(s) by mouth 2 times a day    furosemide 40 mg oral tablet  -- 2 tab(s) by mouth 2 times a day   -- Avoid prolonged or excessive exposure to direct and/or artificial sunlight while taking this medication.  It is very important that you take or use this exactly as directed.  Do not skip doses or discontinue unless directed by your doctor.  It may be advisable to drink a full glass orange juice or eat a banana daily while taking this medication.    calcium acetate 667 mg oral tablet  -- 2 tab(s) by mouth 3 times a day (with meals) I will STOP taking the medications listed below when I get home from the hospital:    carvedilol 3.125 mg oral tablet  -- 1 tab(s) by mouth every 12 hours    torsemide 10 mg oral tablet  -- alternating 2-3 tab(s) by mouth every other day    lisinopril 10 mg oral tablet  -- 1 tab(s) by mouth once a day    colchicine 0.6 mg oral tablet  -- 1 tab(s) by mouth 2 times a day    furosemide 40 mg oral tablet  -- 2 tab(s) by mouth 2 times a day   -- Avoid prolonged or excessive exposure to direct and/or artificial sunlight while taking this medication.  It is very important that you take or use this exactly as directed.  Do not skip doses or discontinue unless directed by your doctor.  It may be advisable to drink a full glass orange juice or eat a banana daily while taking this medication.    calcium acetate 667 mg oral tablet  -- 2 tab(s) by mouth 3 times a day (with meals)    calcium carbonate 1250 mg (500 mg elemental calcium) oral tablet  -- 2 tab(s) by mouth 3 times a day

## 2018-05-31 NOTE — DISCHARGE NOTE ADULT - PLAN OF CARE
Follow up with Dr. Javier Gallego within 3 days after you are discharged from the hospital. You were admitted to the hospital for heart failure likely secondary to long term uncontrolled blood pressure. The "squeeze" of your heart is very poor and as a result you built up excess fluid on your body including the swelling in your legs and water on the lungs. You required admission to the CCU for medications that helped your heart squeeze while on medications to help you urinate excess fluid from the body. This is likely to recur without good follow up with your heart failure doctor. You must take your medications as prescribed and on time. Follow up with Dr. Alva for your history of thyroid CA and primary hypoparathyroidism. Your synthroid was increased from 150 mcg every day to 175 mcg daily. You will need repeat thyroid testing with Dr. Alva in 4-6 weeks to determine if your dose needs to be adjusted further. This is important not only to minimize your symptoms of hyperthyroidism, but also to minimize your risk of recurrence of your cancer. Follow up with Dr. Alva. Your A1c, a measure of your 3 month control of your blood sugar was 6.8. This number is affected by the need for blood transfusions or conditions that increase turn over of blood cells. It is important to record your blood sugars especially leading up to your endocrinology appointments to determine the need to change your medications. Excellent blood sugar control significantly reduces your risk of further stroke, heart attack, kidney disease. It also helps to protect your vision and prevent painful pins and needles sensation in your feet and hands. c/w oral calcium supplements. Follow up with Dr. Javier Gallego on 6/4/2018 at 3 pm You were admitted to the hospital for heart failure likely secondary to long term uncontrolled blood pressure. The "squeeze" of your heart is very poor and as a result you built up excess fluid on your body including the swelling in your legs and water on the lungs. You required admission to the CCU for medications that helped your heart squeeze while on medications to help you urinate excess fluid from the body. This is likely to recur without good follow up with your heart failure doctor. You must take your medications as prescribed and on time. Please check your blood pressure prior to taking Toprol XL. If the top number for the blood pressure is below 100 or if your heart rate if less than 60, please hold off on taking the medication. Please take your medications as instructed.

## 2018-05-31 NOTE — DISCHARGE NOTE ADULT - MEDICATION SUMMARY - MEDICATIONS TO TAKE
I will START or STAY ON the medications listed below when I get home from the hospital:    aspirin 81 mg oral delayed release tablet  -- 1 tab(s) by mouth once a day  -- Indication: For Cardiovascular health    valsartan 80 mg oral tablet  -- 1 tab(s) by mouth every 12 hours  -- Indication: For Heart Failure    gabapentin 100 mg oral capsule  -- 1 cap(s) by mouth every 8 hours  -- Indication: For Dibaetic Neuropathy    Tradjenta 5 mg oral tablet  -- 1 tab(s) by mouth once a day  -- Indication: For Diabetes mellitus, type 2    metoprolol succinate 25 mg oral tablet, extended release  -- 1 tab(s) by mouth once a day  -- Indication: For Heart Failure    bumetanide 1 mg oral tablet  -- 1 tab(s) by mouth 2 times a day , twice a day 7 AM and 3 pm   -- Indication: For Heart Failure    famotidine 20 mg oral tablet  -- 1 tab(s) by mouth 2 times a day  -- Indication: For GERD    magnesium oxide 400 mg (241.3 mg elemental magnesium) oral tablet  -- 1 tab(s) by mouth 3 times a day (with meals)  -- Indication: For Supplemental    levothyroxine 175 mcg (0.175 mg) oral tablet  -- 1 tab(s) by mouth once a day  -- Indication: For Hypothyroidism (acquired)    hydrALAZINE 50 mg oral tablet  -- 1 tab(s) by mouth 3 times a day  -- Indication: For Heart Failure    calcium-vitamin D 500 mg-200 intl units oral tablet  -- 1 tab(s) by mouth 3 times a day  -- Indication: For Supplement: Hypocalcemia    calcitriol 0.5 mcg oral capsule  -- 1 cap(s) by mouth once a day (at bedtime)  -- Indication: For Supplement: Hypocalcemia    cholecalciferol oral tablet  -- 1 tab(s) by mouth once a day , 1000 units  -- Indication: For Supplement: Hypocalcemia I will START or STAY ON the medications listed below when I get home from the hospital:    aspirin 81 mg oral delayed release tablet  -- 1 tab(s) by mouth once a day  -- Indication: For Reduce risk of stroke    valsartan 80 mg oral tablet  -- 1 tab(s) by mouth every 12 hours  -- Indication: For Heart health    gabapentin 100 mg oral capsule  -- 1 cap(s) by mouth every 8 hours  -- Indication: For Nerve pain (neuropathy)    Tradjenta 5 mg oral tablet  -- 1 tab(s) by mouth once a day  -- Indication: For Diabetes mellitus, type 2    metoprolol succinate 25 mg oral tablet, extended release  -- 1 tab(s) by mouth once a day  -- Indication: For Heart health    bumetanide 1 mg oral tablet  -- 1 tab(s) by mouth 2 times a day , twice a day 7 AM and 3 pm   -- Indication: For Diuretic (water pill) to keep fluid off    famotidine 20 mg oral tablet  -- 1 tab(s) by mouth 2 times a day  -- Indication: For Gastrointestinal irritation    magnesium oxide 400 mg (241.3 mg elemental magnesium) oral tablet  -- 1 tab(s) by mouth 3 times a day (with meals)  -- Indication: For Supplement magnesium    levothyroxine 175 mcg (0.175 mg) oral tablet  -- 1 tab(s) by mouth once a day  -- Indication: For Supplement thyroid hormone    hydrALAZINE 50 mg oral tablet  -- 1 tab(s) by mouth 3 times a day  -- Indication: For Control blood pressure    calcium-vitamin D 500 mg-200 intl units oral tablet  -- 1 tab(s) by mouth 3 times a day  -- Indication: For Supplement calcium     calcitriol 0.5 mcg oral capsule  -- 1 cap(s) by mouth once a day (at bedtime)  -- Indication: For Supplement vitamin D    cholecalciferol oral tablet  -- 1 tab(s) by mouth once a day , 1000 units  -- Indication: For Supplement vitamin D

## 2018-05-31 NOTE — PROGRESS NOTE ADULT - SUBJECTIVE AND OBJECTIVE BOX
EP ATTENDING    tele: NSR, Biv-pacing, brief episode of NSVT     no palpitations, no syncope, no angina, less dyspnea, no ICD shocks      MEDICATIONS  (STANDING):  aspirin enteric coated 81 milliGRAM(s) Oral daily  buMETAnide 1 milliGRAM(s) Oral <User Schedule>  calcitriol   Capsule 0.5 MICROGram(s) Oral at bedtime  calcium carbonate 500 mG (Tums) Chewable 1 Tablet(s) Chew four times a day  chlorhexidine 4% Liquid 1 Application(s) Topical <User Schedule>  cholecalciferol 1000 Unit(s) Oral daily  dextrose 5%. 1000 milliLiter(s) (50 mL/Hr) IV Continuous <Continuous>  dextrose 50% Injectable 12.5 Gram(s) IV Push once  dextrose 50% Injectable 25 Gram(s) IV Push once  dextrose 50% Injectable 25 Gram(s) IV Push once  famotidine    Tablet 20 milliGRAM(s) Oral two times a day  gabapentin 100 milliGRAM(s) Oral every 8 hours  heparin  Injectable 5000 Unit(s) SubCutaneous every 12 hours  hydrALAZINE 50 milliGRAM(s) Oral three times a day  insulin lispro (HumaLOG) corrective regimen sliding scale   SubCutaneous three times a day before meals  insulin lispro (HumaLOG) corrective regimen sliding scale   SubCutaneous at bedtime  levothyroxine 175 MICROGram(s) Oral daily  magnesium oxide 400 milliGRAM(s) Oral two times a day with meals  sodium chloride 0.9% lock flush 3 milliLiter(s) IV Push every 8 hours  valsartan 80 milliGRAM(s) Oral every 12 hours    MEDICATIONS  (PRN):  acetaminophen   Tablet. 650 milliGRAM(s) Oral every 6 hours PRN Moderate Pain (4 - 6)  dextrose 40% Gel 15 Gram(s) Oral once PRN Blood Glucose LESS THAN 70 milliGRAM(s)/deciliter  glucagon  Injectable 1 milliGRAM(s) IntraMuscular once PRN Glucose LESS THAN 70 milligrams/deciliter    LABS:                        12.0   6.35  )-----------( 196      ( 31 May 2018 04:30 )    134<L>  |  91<L>  |  15  ----------------------------<  156<H>  4.0   |  29  |  0.93    Ca    7.0<L>      31 May 2018 04:30  Phos  2.8     05-31  Mg     1.8     05-31    TPro  6.8  /  Alb  3.0<L>  /  TBili  3.1<H>  /  DBili  x   /  AST  68<H>  /  ALT  52<H>  /  AlkPhos  274<H>  05-31    Creatinine Trend: 0.93<--, 0.93<--, 0.91<--, 0.89<--, 0.99<--, 0.97<--     PHYSICAL EXAM  Vital Signs Last 24 Hrs  T(C): 36.6 (31 May 2018 12:00), Max: 37.2 (31 May 2018 04:00)  T(F): 97.9 (31 May 2018 12:00), Max: 99 (31 May 2018 04:00)  HR: 90 (31 May 2018 12:00) (88 - 107)  BP: 108/75 (31 May 2018 12:00) (92/64 - 112/80)  BP(mean): 83 (31 May 2018 12:00) (68 - 88)  RR: 15 (31 May 2018 12:00) (10 - 23)  SpO2: 100% (31 May 2018 12:00)       CVP 6  RRR, no murmurs  CTAB  soft nt/nd  no c/c/e      A/P) 63 y/o female PMH hypertension, hyperlipidemia, chronic RBBB, thyroid CA s/p thyroidectomy, nonischemic cardiomyopathy with an ejection fraction of 11% and normal coronaries on left heart cardiac catheterization 2014, s/p [employer] MRI CRT-ICD placement in June 2017 ([place]), admitted with decompensated CHF. EP call for BiV-optimization and NSVT on tele. She denies ICD shocks    -Last interrogation noted 99% BIV pacing with normal function, based on EKG her BIVICD is optimized  -medical management as per cardiology and CHF  -no need for AAD therapy given no ICD shocks because her VT is non-sustained  -no further EP work up needed as long as VT remains non-sustained not requiring ICD shocks  -supportive care as per CCU

## 2018-05-31 NOTE — PROGRESS NOTE ADULT - SUBJECTIVE AND OBJECTIVE BOX
INTERVAL HPI/OVERNIGHT EVENTS:    family bedside  pt sleeping, not participating in exam   no abdominal discomfort noted w/palpation   had BM this am  per staff    MEDICATIONS  (STANDING):  aspirin enteric coated 81 milliGRAM(s) Oral daily  buMETAnide 1 milliGRAM(s) Oral <User Schedule>  calcitriol   Capsule 0.5 MICROGram(s) Oral at bedtime  calcium carbonate 500 mG (Tums) Chewable 1 Tablet(s) Chew four times a day  chlorhexidine 4% Liquid 1 Application(s) Topical <User Schedule>  cholecalciferol 1000 Unit(s) Oral daily  dextrose 5%. 1000 milliLiter(s) (50 mL/Hr) IV Continuous <Continuous>  dextrose 50% Injectable 12.5 Gram(s) IV Push once  dextrose 50% Injectable 25 Gram(s) IV Push once  dextrose 50% Injectable 25 Gram(s) IV Push once  famotidine    Tablet 20 milliGRAM(s) Oral two times a day  gabapentin 100 milliGRAM(s) Oral every 8 hours  heparin  Injectable 5000 Unit(s) SubCutaneous every 12 hours  hydrALAZINE 50 milliGRAM(s) Oral three times a day  insulin lispro (HumaLOG) corrective regimen sliding scale   SubCutaneous three times a day before meals  insulin lispro (HumaLOG) corrective regimen sliding scale   SubCutaneous at bedtime  levothyroxine 175 MICROGram(s) Oral daily  magnesium oxide 400 milliGRAM(s) Oral two times a day with meals  sodium chloride 0.9% lock flush 3 milliLiter(s) IV Push every 8 hours  valsartan 80 milliGRAM(s) Oral every 12 hours    MEDICATIONS  (PRN):  acetaminophen   Tablet. 650 milliGRAM(s) Oral every 6 hours PRN Moderate Pain (4 - 6)  dextrose 40% Gel 15 Gram(s) Oral once PRN Blood Glucose LESS THAN 70 milliGRAM(s)/deciliter  glucagon  Injectable 1 milliGRAM(s) IntraMuscular once PRN Glucose LESS THAN 70 milligrams/deciliter      Allergies    Isordil (Headache)  penicillin (Rash)    Intolerances        Review of Systems: *minimally participating    General:  No wt loss, fevers, chills, night sweats, fatigue   Eyes:  Good vision, no reported pain  ENT:  No sore throat, pain, runny nose, dysphagia  CV:  No pain, palpitations, hypo/hypertension  Resp:  No dyspnea, cough, tachypnea, wheezing  GI:  No pain, No nausea, No vomiting, No diarrhea, No constipation, No weight loss, No fever, No pruritis, No rectal bleeding, No melena, No dysphagia  :  No pain, bleeding, incontinence, nocturia  Muscle:  No pain, weakness  Neuro:  No weakness, tingling, memory problems  Psych:  No fatigue, insomnia, mood problems, depression  Endocrine:  No polyuria, polydypsia, cold/heat intolerance  Heme:  No petechiae, ecchymosis, easy bruisability  Skin:  No rash, tattoos, scars, edema      Vital Signs Last 24 Hrs  T(C): 36.7 (31 May 2018 07:00), Max: 37.2 (31 May 2018 04:00)  T(F): 98.1 (31 May 2018 07:00), Max: 99 (31 May 2018 04:00)  HR: 88 (31 May 2018 10:00) (88 - 107)  BP: 93/62 (31 May 2018 10:00) (92/64 - 112/80)  BP(mean): 69 (31 May 2018 10:00) (68 - 88)  RR: 10 (31 May 2018 10:00) (10 - 23)  SpO2: 98% (31 May 2018 10:00) (96% - 100%)    PHYSICAL EXAM:    Constitutional: NAD  HEENT: EOMI, throat clear  Neck: No LAD, supple  Respiratory: CTA and P  Cardiovascular: S1 and S2, RRR, no M  Gastrointestinal: BS+, soft, NT/ND, neg HSM,  Extremities: No peripheral edema, neg clubbing, cyanosis  Vascular: 2+ peripheral pulses  Neurological: A/O , no focal deficits  Psychiatric: Normal mood, normal affect  Skin: No rashes      LABS:                        12.0   6.35  )-----------( 196      ( 31 May 2018 04:30 )             36.8     05-31    134<L>  |  91<L>  |  15  ----------------------------<  156<H>  4.0   |  29  |  0.93    Ca    7.0<L>      31 May 2018 04:30  Phos  2.8     05-31  Mg     1.8     05-31    TPro  6.8  /  Alb  3.0<L>  /  TBili  3.1<H>  /  DBili  x   /  AST  68<H>  /  ALT  52<H>  /  AlkPhos  274<H>  05-31          RADIOLOGY & ADDITIONAL TESTS:

## 2018-05-31 NOTE — PROGRESS NOTE ADULT - SUBJECTIVE AND OBJECTIVE BOX
NEPHROLOGY-[facility] [phone]        Patient seen and examined in bed.  She was not on  and breathing well        MEDICATIONS  (STANDING):  aspirin enteric coated 81 milliGRAM(s) Oral daily  buMETAnide 1 milliGRAM(s) Oral <User Schedule>  calcitriol   Capsule 0.5 MICROGram(s) Oral at bedtime  calcium carbonate 500 mG (Tums) Chewable 1 Tablet(s) Chew four times a day  chlorhexidine 4% Liquid 1 Application(s) Topical <User Schedule>  cholecalciferol 1000 Unit(s) Oral daily  dextrose 5%. 1000 milliLiter(s) (50 mL/Hr) IV Continuous <Continuous>  dextrose 50% Injectable 12.5 Gram(s) IV Push once  dextrose 50% Injectable 25 Gram(s) IV Push once  dextrose 50% Injectable 25 Gram(s) IV Push once  famotidine    Tablet 20 milliGRAM(s) Oral two times a day  gabapentin 100 milliGRAM(s) Oral every 8 hours  heparin  Injectable 5000 Unit(s) SubCutaneous every 12 hours  hydrALAZINE 50 milliGRAM(s) Oral three times a day  insulin lispro (HumaLOG) corrective regimen sliding scale   SubCutaneous three times a day before meals  insulin lispro (HumaLOG) corrective regimen sliding scale   SubCutaneous at bedtime  levothyroxine 175 MICROGram(s) Oral daily  magnesium oxide 400 milliGRAM(s) Oral two times a day with meals  sodium chloride 0.9% lock flush 3 milliLiter(s) IV Push every 8 hours  valsartan 80 milliGRAM(s) Oral every 12 hours      VITAL:  T(C): , Max: 37.2 (18 @ 04:00)  T(F): , Max: 99 (18 @ 04:00)  HR: 99 (18 @ 14:00)  BP: 95/69 (18 @ 14:00)  BP(mean): 75 (18 @ 14:00)  RR: 20 (18 @ 14:00)  SpO2: 96% (18 @ 14:00)  Wt(kg): --    I and O's:     @ 07:01  -   07:00  --------------------------------------------------------  IN: 590.2 mL / OUT: 3340 mL / NET: -2749.8 mL     @ 07:01  -   @ 14:03  --------------------------------------------------------  IN: 480 mL / OUT: 1750 mL / NET: -1270 mL          PHYSICAL EXAM:    Constitutional: NAD  HEENT: PERRLA    Neck:  No JVD  Respiratory: CTAB/L  Cardiovascular: S1 and S2  Gastrointestinal: BS+, soft, NT/ND  Extremities: wrinkled peripheral edema  Neurological: A/O x 3, no focal deficits  Psychiatric: Normal mood, normal affect  : No Gustafson  Skin: No rashes  Access: Not applicable    LABS:                        12.0   6.35  )-----------( 196      ( 31 May 2018 04:30 )             36.8         134<L>  |  91<L>  |  15  ----------------------------<  156<H>  4.0   |  29  |  0.93    Ca    7.0<L>      31 May 2018 04:30  Phos  2.8       Mg     1.8         TPro  6.8  /  Alb  3.0<L>  /  TBili  3.1<H>  /  DBili  x   /  AST  68<H>  /  ALT  52<H>  /  AlkPhos  274<H>            Urine Studies:          RADIOLOGY & ADDITIONAL STUDIES:

## 2018-05-31 NOTE — PROGRESS NOTE ADULT - PROBLEM SELECTOR PLAN 1
- Dobutamine was stopped at 8 PM on 5/29.  - JACKSON calculated at 2:30 AM - CO= 4.708; CI= 2.27. CVP 16.  - Dobutamine was restarted at 4 AM. JACKSON pending.  - Appreciate HF recs. - Dobutamine was stopped at 8 PM on 5/29.  - JACKSON calculated at 4:30 AM - CO= 5.91; CI= 2.86. CVP 15.  (based on CVP 15, MAP 75)  - Likely removal of swan today.  - Appreciate HF recs.

## 2018-05-31 NOTE — PROGRESS NOTE ADULT - PROBLEM SELECTOR PLAN 4
Continue synthroid.    ppx: HSQ    Dispo: wean dobutamine per HF recommendations.     Tim Genao MD  [facility]   Pager # [phone]/ [phone] Continue synthroid.     ppx: HSQ    Dispo: transfer to floor s/p swan removal. likely d/c within 24 hours.    Tim Genao MD  [facility]   Pager # [phone]/ [phone]

## 2018-05-31 NOTE — PROGRESS NOTE ADULT - ASSESSMENT
Assessment  Hypothyroidism:  On synthroid 150 mcg po daily, asymptomatic, tsh not at target considering hx of thyroid cancer.  CHF: On medications, stable, monitored.  HTN: Controlled, On med.  DMT2: On insulin, sugars in acceptable range, eating full meals.

## 2018-05-31 NOTE — DISCHARGE NOTE ADULT - CARE PLAN
Principal Discharge DX:	Acute on chronic systolic congestive heart failure  Goal:	Follow up with Dr. Javier Gallego within 3 days after you are discharged from the hospital.  Assessment and plan of treatment:	You were admitted to the hospital for heart failure likely secondary to long term uncontrolled blood pressure. The "squeeze" of your heart is very poor and as a result you built up excess fluid on your body including the swelling in your legs and water on the lungs. You required admission to the CCU for medications that helped your heart squeeze while on medications to help you urinate excess fluid from the body. This is likely to recur without good follow up with your heart failure doctor. You must take your medications as prescribed and on time.  Secondary Diagnosis:	Thyroid ca  Goal:	Follow up with Dr. Alva for your history of thyroid CA and primary hypoparathyroidism.  Assessment and plan of treatment:	Your synthroid was increased from 150 mcg every day to 175 mcg daily. You will need repeat thyroid testing with Dr. Alva in 4-6 weeks to determine if your dose needs to be adjusted further. This is important not only to minimize your symptoms of hyperthyroidism, but also to minimize your risk of recurrence of your cancer.  Secondary Diagnosis:	Diabetes mellitus, type 2  Goal:	Follow up with Dr. Alva.  Assessment and plan of treatment:	Your A1c, a measure of your 3 month control of your blood sugar was 6.8. This number is affected by the need for blood transfusions or conditions that increase turn over of blood cells. It is important to record your blood sugars especially leading up to your endocrinology appointments to determine the need to change your medications. Excellent blood sugar control significantly reduces your risk of further stroke, heart attack, kidney disease. It also helps to protect your vision and prevent painful pins and needles sensation in your feet and hands.  Secondary Diagnosis:	Hypocalcemia Principal Discharge DX:	Acute on chronic systolic congestive heart failure  Goal:	Follow up with Dr. Javier Gallego within 3 days after you are discharged from the hospital.  Assessment and plan of treatment:	You were admitted to the hospital for heart failure likely secondary to long term uncontrolled blood pressure. The "squeeze" of your heart is very poor and as a result you built up excess fluid on your body including the swelling in your legs and water on the lungs. You required admission to the CCU for medications that helped your heart squeeze while on medications to help you urinate excess fluid from the body. This is likely to recur without good follow up with your heart failure doctor. You must take your medications as prescribed and on time.  Secondary Diagnosis:	Thyroid ca  Goal:	Follow up with Dr. Alva for your history of thyroid CA and primary hypoparathyroidism.  Assessment and plan of treatment:	Your synthroid was increased from 150 mcg every day to 175 mcg daily. You will need repeat thyroid testing with Dr. Alva in 4-6 weeks to determine if your dose needs to be adjusted further. This is important not only to minimize your symptoms of hyperthyroidism, but also to minimize your risk of recurrence of your cancer.  Secondary Diagnosis:	Diabetes mellitus, type 2  Goal:	Follow up with Dr. Alva.  Assessment and plan of treatment:	Your A1c, a measure of your 3 month control of your blood sugar was 6.8. This number is affected by the need for blood transfusions or conditions that increase turn over of blood cells. It is important to record your blood sugars especially leading up to your endocrinology appointments to determine the need to change your medications. Excellent blood sugar control significantly reduces your risk of further stroke, heart attack, kidney disease. It also helps to protect your vision and prevent painful pins and needles sensation in your feet and hands.  Secondary Diagnosis:	Hypocalcemia  Goal:	c/w oral calcium supplements. Principal Discharge DX:	Acute on chronic systolic congestive heart failure  Goal:	Follow up with Dr. Javier Gallego on 6/4/2018 at 3 pm  Assessment and plan of treatment:	You were admitted to the hospital for heart failure likely secondary to long term uncontrolled blood pressure. The "squeeze" of your heart is very poor and as a result you built up excess fluid on your body including the swelling in your legs and water on the lungs. You required admission to the CCU for medications that helped your heart squeeze while on medications to help you urinate excess fluid from the body. This is likely to recur without good follow up with your heart failure doctor. You must take your medications as prescribed and on time. Please check your blood pressure prior to taking Toprol XL. If the top number for the blood pressure is below 100 or if your heart rate if less than 60, please hold off on taking the medication.  Secondary Diagnosis:	Thyroid ca  Goal:	Follow up with Dr. Alva for your history of thyroid CA and primary hypoparathyroidism.  Assessment and plan of treatment:	Your synthroid was increased from 150 mcg every day to 175 mcg daily. You will need repeat thyroid testing with Dr. Alva in 4-6 weeks to determine if your dose needs to be adjusted further. This is important not only to minimize your symptoms of hyperthyroidism, but also to minimize your risk of recurrence of your cancer.  Secondary Diagnosis:	Diabetes mellitus, type 2  Goal:	Follow up with Dr. Alva.  Assessment and plan of treatment:	Your A1c, a measure of your 3 month control of your blood sugar was 6.8. This number is affected by the need for blood transfusions or conditions that increase turn over of blood cells. It is important to record your blood sugars especially leading up to your endocrinology appointments to determine the need to change your medications. Excellent blood sugar control significantly reduces your risk of further stroke, heart attack, kidney disease. It also helps to protect your vision and prevent painful pins and needles sensation in your feet and hands.  Secondary Diagnosis:	Hypocalcemia  Goal:	c/w oral calcium supplements.  Assessment and plan of treatment:	Please take your medications as instructed.

## 2018-05-31 NOTE — DISCHARGE NOTE ADULT - HOSPITAL COURSE
62F with a past medical history of hypertension, hyperlipidemia, chronic RBBB,  obesity, thyroid CA, nonischemic cardiomyopathy with an ejection fraction of 11% and normal coronaries on recent cardiac catheterization at [facility] with Dr Chavarria , s/p Medtronic Sofi MRI CRT-ICD placement in June 2017, admitted last month with acute on chronic systolic HF exacerbation, presented on 5/23 with SOB, volume overload, acute on chronic systolic HF exacerbation.  She reports compliance with diet and meds, abdominal bloating, EDDY after a few steps, orthopnea cough with yellow phlegm, substernal chest pain associated with cough.  Denies nausea, vomit chills, diaphoresis,   She has seen her OP Cardiologist once since DC. Hospital course was complicated by escalating diuretic requirements, initially on lasix push, converted to bumex and finally admitted to the CCU for inotropy assisted diuresis with dobutamine gtt and bumex. RHC and swanganz catheter placed for HD monitoring. On presentation patient weight was 62F with a past medical history of hypertension, hyperlipidemia, chronic RBBB,  obesity, thyroid CA, nonischemic cardiomyopathy with an ejection fraction of 11% and normal coronaries on recent cardiac catheterization at [facility] with Dr Chavarria , s/p Medtronic Sofi MRI CRT-ICD placement in June 2017, admitted last month with acute on chronic systolic HF exacerbation, presented on 5/23 with SOB, volume overload, acute on chronic systolic HF exacerbation.  She reports compliance with diet and meds, abdominal bloating, EDDY after a few steps, orthopnea cough with yellow phlegm, substernal chest pain associated with cough.  Denies nausea, vomit chills, diaphoresis,   She has seen her OP Cardiologist once since DC. Hospital course was complicated by escalating diuretic requirements, initially on lasix push, converted to bumex and finally admitted to the CCU for inotropy assisted diuresis with dobutamine gtt and bumex. RHC and swanganz catheter placed for HD monitoring. During CCU stay patient net negative 25L. On presentation patient weight was 114 kg, dry weight on discharge was _______________. CCU course complicated by acute on chronic hypocalcemia 2/2 aggressive diuresis. Endocrinology and Nephrology following, and patient was repleted via IV bolus calcium gluconate to a goal of 7.5 corrected. Patient remained asymptomatic from hypocalcemia. Course also complicated by episodes of NSVT on tele ( asy , followed by EP cardiology during admission, BiVAICD functioning appropriately on interrogation and patient did not receive shock from episodes. Heart failure service following throughout the course of admission. Patient was transferred back to medicine ________________________ 62F with a past medical history of hypertension, hyperlipidemia, chronic RBBB,  obesity, thyroid CA, nonischemic cardiomyopathy with an ejection fraction of 11% and normal coronaries on recent cardiac catheterization at [facility] with Dr Chavarria , s/p Medtronic Sofi MRI CRT-ICD placement in June 2017, admitted last month with acute on chronic systolic HF exacerbation, presented on 5/23 with SOB, volume overload, acute on chronic systolic HF exacerbation.  She reports compliance with diet and meds, abdominal bloating, EDDY after a few steps, orthopnea cough with yellow phlegm, substernal chest pain associated with cough.  Denies nausea, vomit chills, diaphoresis,  She has seen her OP Cardiologist once since DC. Hospital course was complicated by escalating diuretic requirements, initially on lasix push, converted to bumex and finally admitted to the CCU for inotropy assisted diuresis with dobutamine gtt and bumex. RHC and swanganz catheter placed for HD monitoring. During CCU stay patient net negative 25L. On presentation patient weight was 114 kg, dry weight on discharge was 94.8kg. CCU course complicated by acute on chronic hypocalcemia 2/2 aggressive diuresis. Endocrinology and Nephrology following, and patient was repleted via IV bolus calcium gluconate to a goal of 7.5 corrected. Patient remained asymptomatic from hypocalcemia. Course also complicated by episodes of NSVT on tele ( asy , followed by EP cardiology during admission, BiVAICD functioning appropriately on interrogation and patient did not receive shock from episodes. Heart failure service following throughout the course of admission. Patient will go home on a regimen of bumex 1mg BID, hydralazine 50 PO TID, Valsartan 80 mg BID and Toprol 25 PO XL if blood pressure permits. Patient instructed to check her blood pressure and HR at least on a daily basis. Gustafson discontinued and patient has been voiding appropriately.     Patient is currently medically stable for discharge. Medications sent to pharmacy. Patient will follow up with Dr. Robbins from Cardiology on June 4th at 1:30 pm and will see Dr. Gallego from Heart Failure on June 4th 3:00pm. Number and contact information provided for Medicine clinic at [address] [place] [address]. Patient will eventually will need evaluation for LVAD vs transplant.

## 2018-05-31 NOTE — PROGRESS NOTE ADULT - ASSESSMENT
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    The patient has hypervolemic hyponatremia likely secondary to heart failure.  She also has hypocalcemia likely secondary to the fact that she likely has no parathyroid gland. Acute decompensated heart failure.      Endocrine. continue p.o. calcium supplementation and rocaltrol. DC Tums and start Oscal with Vit D  Renal.   Diuresis per CACT/HF team.     Maintain K >3.5 per CCU  Renal function intact. Maintain fluid restriction, daily weights, I&O's  goal negative 2 L, was ~4.4 L net negative over the past 24 hours	  Gastrointestinal.  likely congestive hepatic pathology.  Hypervolemic hyponatremia Na ~ stable   Phosphorus- Off  Phoslo due to low phos.      [facility] is bid and outpt [facility]  [phone]

## 2018-05-31 NOTE — DISCHARGE NOTE ADULT - PATIENT PORTAL LINK FT
You can access the [facility] Portal, offered by [facility], by registering with the following website: [email or web address]

## 2018-05-31 NOTE — DISCHARGE NOTE ADULT - MEDICATION SUMMARY - MEDICATIONS TO CHANGE
I will SWITCH the dose or number of times a day I take the medications listed below when I get home from the hospital:    calcitriol 0.5 mcg oral capsule  -- 1 cap(s) by mouth once a day, 1/2 a cap at night    levothyroxine 150 mcg (0.15 mg) oral tablet  -- 1 tab(s) by mouth once a day    magnesium oxide 400 mg (241.3 mg elemental magnesium) oral tablet  -- 1 tab(s) by mouth 3 times a day (with meals) I will SWITCH the dose or number of times a day I take the medications listed below when I get home from the hospital:    calcitriol 0.5 mcg oral capsule  -- 1 cap(s) by mouth once a day, 1/2 a cap at night    levothyroxine 150 mcg (0.15 mg) oral tablet  -- 1 tab(s) by mouth once a day

## 2018-05-31 NOTE — PROGRESS NOTE ADULT - SUBJECTIVE AND OBJECTIVE BOX
Patient with no anginal chest pain; dyspnea improved    Clinically improving  diuresing well -  negative ~ 1 L today so far       MEDICATIONS  (STANDING):  aspirin enteric coated 81 milliGRAM(s) Oral daily  buMETAnide 1 milliGRAM(s) Oral <User Schedule>  calcitriol   Capsule 0.5 MICROGram(s) Oral at bedtime  calcium carbonate 500 mG (Tums) Chewable 1 Tablet(s) Chew four times a day  chlorhexidine 4% Liquid 1 Application(s) Topical <User Schedule>  cholecalciferol 1000 Unit(s) Oral daily  dextrose 5%. 1000 milliLiter(s) (50 mL/Hr) IV Continuous <Continuous>  dextrose 50% Injectable 12.5 Gram(s) IV Push once  dextrose 50% Injectable 25 Gram(s) IV Push once  dextrose 50% Injectable 25 Gram(s) IV Push once  famotidine    Tablet 20 milliGRAM(s) Oral two times a day  gabapentin 100 milliGRAM(s) Oral every 8 hours  heparin  Injectable 5000 Unit(s) SubCutaneous every 12 hours  hydrALAZINE 50 milliGRAM(s) Oral three times a day  insulin lispro (HumaLOG) corrective regimen sliding scale   SubCutaneous three times a day before meals  insulin lispro (HumaLOG) corrective regimen sliding scale   SubCutaneous at bedtime  levothyroxine 175 MICROGram(s) Oral daily  magnesium oxide 400 milliGRAM(s) Oral two times a day with meals  sodium chloride 0.9% lock flush 3 milliLiter(s) IV Push every 8 hours  valsartan 80 milliGRAM(s) Oral every 12 hours    MEDICATIONS  (PRN):  acetaminophen   Tablet. 650 milliGRAM(s) Oral every 6 hours PRN Moderate Pain (4 - 6)  dextrose 40% Gel 15 Gram(s) Oral once PRN Blood Glucose LESS THAN 70 milliGRAM(s)/deciliter  glucagon  Injectable 1 milliGRAM(s) IntraMuscular once PRN Glucose LESS THAN 70 milligrams/deciliter      LABS:                        12.0   6.35  )-----------( 196      ( 31 May 2018 04:30 )             36.8     Hemoglobin: 12.0 g/dL ( @ 04:30)  Hemoglobin: 11.5 g/dL ( @ 02:30)  Hemoglobin: 11.2 g/dL ( @ 04:45)  Hemoglobin: 10.8 g/dL ( @ 06:01)  Hemoglobin: 11.2 g/dL ( @ 06:23)        134<L>  |  91<L>  |  15  ----------------------------<  156<H>  4.0   |  29  |  0.93    Ca    7.0<L>      31 May 2018 04:30  Phos  2.8       Mg     1.8         TPro  6.8  /  Alb  3.0<L>  /  TBili  3.1<H>  /  DBili  x   /  AST  68<H>  /  ALT  52<H>  /  AlkPhos  274<H>      Creatinine Trend: 0.93<--, 0.93<--, 0.91<--, 0.89<--, 0.99<--, 0.97<--           PHYSICAL EXAM  Vital Signs Last 24 Hrs  T(C): 36.6 (31 May 2018 12:00), Max: 37.2 (31 May 2018 04:00)  T(F): 97.9 (31 May 2018 12:00), Max: 99 (31 May 2018 04:00)  HR: 99 (31 May 2018 14:00) (88 - 107)  BP: 95/69 (31 May 2018 14:00) (92/64 - 112/80)  BP(mean): 75 (31 May 2018 14:00) (68 - 88)  RR: 20 (31 May 2018 14:00) (10 - 23)  SpO2: 96% (31 May 2018 14:00) (96% - 100%)        Cardiovascular: Normal S1 S2, No JVD, 1/6 ROSALINDA murmur  Respiratory: Lungs clear to auscultation, normal effort 	  Gastrointestinal:  Soft, Non-tender, + BS	  Extremities no c/c/+ edema B/LLE's   Peripheral pulses palpable 2+ bilaterally    TELEMETRY: NSR, Biv-pacing, brief episode of NSVT 	       < from: TTE with Doppler (w/Cont) (18 @ 20:04) >  CONCLUSIONS:  1. Mitral annular calcification, otherwise normal mitral  valve. Moderate mitral regurgitation.  2. Moderately dilated left atrium.  LA volume index = 47  cc/m2.  3. Mild left ventricular enlargement.  4. Severe global left ventricular systolic dysfunction.  Endocardial visualization enhanced with intravenous  injection of echo contrast (Definity).  No LV thrombus  seen.  5. Right ventricular enlargement with decreased right  ventricular systolic function.  A device wire is noted in  the right heart.  6. Estimated right ventricular systolic pressure equals 42  mm Hg, assuming right atrial pressure equals 10 mm Hg,  consistent with mild pulmonary hypertension.  7.Normal tricuspid valve.  Severe tricuspid regurgitation.  *** Compared with echocardiogram of 2017, left  ventricular systolic function has deteriorated.  ------------------------------------------------------------------------  Confirmed on  2018 - 13:50:18 by Xander Tyson M.D.    < end of copied text >      ASSESSMENT/PLAN:   62 year old Female with past medical history of hypertension, hyperlipidemia, chronic RBBB, obesity, thyroid CA s/p thyroidectomy, hypocalcemia (managed by OP endo Dr murray), nonischemic cardiomyopathy with an ejection fraction of 11% and  normal coronaries on left heart cardiac catheterization  , s/p Medtronic Claria MRI CRT-ICD placement in 2017, admitted last month with acute on chronic systolic HF exacerbation, re admitted with acute on chronic systolic HF exacerbation, s/p RHC revealing low CI, trx to CCU for 	    - heart failure noted - Bumex 1 mg po BID  - diuresing well - continuing to monitor  strict I+Os  - hypocalcemia, Endo/renal  f/u cont to replete  - CCU care appreciated

## 2018-05-31 NOTE — PROGRESS NOTE ADULT - PROBLEM SELECTOR PLAN 2
- Dr. Alva following.  - Repeat Vit D25 wnl, PTH borderline low normal.  - Patient is asymptomatic, QTc unreliable in the setting of pacemaker. Aggressive repletion to corrected total serum Ca 7.5 in the setting of loop diuresis. - Dr. Alva following. resolved.  - Repeat Vit D25 wnl, PTH borderline low normal.  - Patient is asymptomatic, QTc unreliable in the setting of pacemaker. Aggressive repletion to corrected total serum Ca 7.5 in the setting of loop diuresis.

## 2018-05-31 NOTE — DISCHARGE NOTE ADULT - PROVIDER TOKENS
FREE:[LAST:[Rosendo],FIRST:[Evelyn],PHONE:[(   )    -],FAX:[(   )    -],ADDRESS:[[address] [place] [address]   June 4th 1:30 pm]] TOKEN:'[id number]:'[id number]',FREE:[LAST:[Rosendo],FIRST:[Evelyn],PHONE:[(   )    -],FAX:[(   )    -],ADDRESS:[[address] [place] [address]   June 4th 1:30 pm]],TOKEN:'[id number]'

## 2018-05-31 NOTE — DISCHARGE NOTE ADULT - ADDITIONAL INSTRUCTIONS
1) Please follow up with Dr. Evelyn Robbins on June 4th at 1:30 pm. His address will be provided below 1) Please follow up with Dr. Evelyn Robbins from Cardiology on June 4th at 1:30 pm. His address will be provided below  2) Please follow up with Dr. Javier Gallego from the heart failure team on June 4th at 3:00 pm  3) Please make sure you check your blood pressure prior to taking metoprolol succinate. If the top number is below 100 or if your heart rate is less than 60, please do not take the medication and please address this at your appointments on June 4th   4) Please follow up with Dr. Alva for repeat thyroid testing in 3 weeks. Their information will be provided below  5) Please ensure that you have repeat blood work at your visit on June 4th to asses your calcium level to ensure that your levels are within appropriate range.  6) Please find Dr. Cade contact information. This contact number is for our primary care office for you to establish care.

## 2018-05-31 NOTE — DISCHARGE NOTE ADULT - INSTRUCTIONS
Please maintain a low salt diet and please limit the amount of fluids intake. Please monitor the amount of sugar based products in your diet and monitor your finger sticks

## 2018-05-31 NOTE — PROGRESS NOTE ADULT - PROBLEM SELECTOR PLAN 1
-No longer in cardiogenic shock.  -18 CVP 9, PCWP 16, PA 38//, PA sat 68.7%, CO 6.58, CI 2.93. [place] was removed.   -Renal function stable BUN/Cr 15/0.93.    -Continue hydral 50 mg po TID, valsartan 80 mg po BID hold only if SBP <90. Off BB while on . Will hold off on uptitration of valsartan today as SBP borderline low. Plans are to get her on Entresto as an outpatient.   -D/c planning for Saturday? -No longer in cardiogenic shock.  -18 CVP 9, PCWP 16, PA 38//, PA sat 68.7%, CO 6.58, CI 2.93. [place] was removed.   -Got 3 doses of Bumex 1 mg yesterday, diuresed well -2.7 L.   -Agree to continue w/ Bumex 1 mg po BID. Has diuresed 1 L this morning.   -Renal function stable BUN/Cr 15/0.93.    -Continue hydral 50 mg po TID, valsartan 80 mg po BID hold only if SBP <90. Off BB while on . Will hold off on uptitration of valsartan today as SBP borderline low. Plans are to get her on [facility] as an outpatient.   -D/c planning for Saturday?

## 2018-05-31 NOTE — DISCHARGE NOTE ADULT - CARE PROVIDERS DIRECT ADDRESSES
,DirectAddress_Unknown ,[email or web address],DirectAddress_Unknown,DirectAddress_Unknown

## 2018-06-01 VITALS
OXYGEN SATURATION: 100 % | RESPIRATION RATE: 17 BRPM | SYSTOLIC BLOOD PRESSURE: 87 MMHG | DIASTOLIC BLOOD PRESSURE: 51 MMHG | HEART RATE: 94 BPM

## 2018-06-01 LAB
ALBUMIN SERPL ELPH-MCNC: 2.9 G/DL — LOW (ref 3.3–5)
ALP SERPL-CCNC: 283 U/L — HIGH (ref 40–120)
ALT FLD-CCNC: 52 U/L — HIGH (ref 4–33)
AST SERPL-CCNC: 69 U/L — HIGH (ref 4–32)
BILIRUB SERPL-MCNC: 2.7 MG/DL — HIGH (ref 0.2–1.2)
BUN SERPL-MCNC: 17 MG/DL — SIGNIFICANT CHANGE UP (ref 7–23)
CALCIUM SERPL-MCNC: 6.8 MG/DL — LOW (ref 8.4–10.5)
CHLORIDE SERPL-SCNC: 96 MMOL/L — LOW (ref 98–107)
CO2 SERPL-SCNC: 25 MMOL/L — SIGNIFICANT CHANGE UP (ref 22–31)
CREAT SERPL-MCNC: 0.91 MG/DL — SIGNIFICANT CHANGE UP (ref 0.5–1.3)
GLUCOSE SERPL-MCNC: 119 MG/DL — HIGH (ref 70–99)
HCT VFR BLD CALC: 36.6 % — SIGNIFICANT CHANGE UP (ref 34.5–45)
HGB BLD-MCNC: 11.9 G/DL — SIGNIFICANT CHANGE UP (ref 11.5–15.5)
MAGNESIUM SERPL-MCNC: 2 MG/DL — SIGNIFICANT CHANGE UP (ref 1.6–2.6)
MCHC RBC-ENTMCNC: 23.8 PG — LOW (ref 27–34)
MCHC RBC-ENTMCNC: 32.5 % — SIGNIFICANT CHANGE UP (ref 32–36)
MCV RBC AUTO: 73.3 FL — LOW (ref 80–100)
NRBC # FLD: 0 — SIGNIFICANT CHANGE UP
PHOSPHATE SERPL-MCNC: 2.5 MG/DL — SIGNIFICANT CHANGE UP (ref 2.5–4.5)
PLATELET # BLD AUTO: 230 K/UL — SIGNIFICANT CHANGE UP (ref 150–400)
PMV BLD: SIGNIFICANT CHANGE UP FL (ref 7–13)
POTASSIUM SERPL-MCNC: 4.3 MMOL/L — SIGNIFICANT CHANGE UP (ref 3.5–5.3)
POTASSIUM SERPL-SCNC: 4.3 MMOL/L — SIGNIFICANT CHANGE UP (ref 3.5–5.3)
PROT SERPL-MCNC: 6.5 G/DL — SIGNIFICANT CHANGE UP (ref 6–8.3)
RBC # BLD: 4.99 M/UL — SIGNIFICANT CHANGE UP (ref 3.8–5.2)
RBC # FLD: 25.4 % — HIGH (ref 10.3–14.5)
SODIUM SERPL-SCNC: 134 MMOL/L — LOW (ref 135–145)
WBC # BLD: 6.3 K/UL — SIGNIFICANT CHANGE UP (ref 3.8–10.5)
WBC # FLD AUTO: 6.3 K/UL — SIGNIFICANT CHANGE UP (ref 3.8–10.5)

## 2018-06-01 PROCEDURE — 99233 SBSQ HOSP IP/OBS HIGH 50: CPT

## 2018-06-01 RX ORDER — METOPROLOL TARTRATE 50 MG
1 TABLET ORAL
Qty: 30 | Refills: 0 | OUTPATIENT
Start: 2018-06-01 | End: 2018-06-30

## 2018-06-01 RX ORDER — GABAPENTIN 400 MG/1
1 CAPSULE ORAL
Qty: 90 | Refills: 0 | OUTPATIENT
Start: 2018-06-01 | End: 2018-06-30

## 2018-06-01 RX ORDER — CALCITRIOL 0.5 UG/1
1 CAPSULE ORAL
Qty: 30 | Refills: 0 | OUTPATIENT
Start: 2018-06-01 | End: 2018-06-30

## 2018-06-01 RX ORDER — HYDRALAZINE HCL 50 MG
1 TABLET ORAL
Qty: 90 | Refills: 0 | OUTPATIENT
Start: 2018-06-01 | End: 2018-06-30

## 2018-06-01 RX ORDER — METOPROLOL TARTRATE 50 MG
25 TABLET ORAL DAILY
Qty: 0 | Refills: 0 | Status: DISCONTINUED | OUTPATIENT
Start: 2018-06-01 | End: 2018-06-01

## 2018-06-01 RX ORDER — CARVEDILOL PHOSPHATE 80 MG/1
3.12 CAPSULE, EXTENDED RELEASE ORAL EVERY 12 HOURS
Qty: 0 | Refills: 0 | Status: DISCONTINUED | OUTPATIENT
Start: 2018-06-01 | End: 2018-06-01

## 2018-06-01 RX ORDER — VALSARTAN 80 MG/1
1 TABLET ORAL
Qty: 60 | Refills: 0 | OUTPATIENT
Start: 2018-06-01 | End: 2018-06-30

## 2018-06-01 RX ORDER — CHOLECALCIFEROL (VITAMIN D3) 125 MCG
1 CAPSULE ORAL
Qty: 0 | Refills: 0 | COMMUNITY

## 2018-06-01 RX ORDER — CALCIUM GLUCONATE 100 MG/ML
1 VIAL (ML) INTRAVENOUS ONCE
Qty: 0 | Refills: 0 | Status: COMPLETED | OUTPATIENT
Start: 2018-06-01 | End: 2018-06-01

## 2018-06-01 RX ORDER — CHOLECALCIFEROL (VITAMIN D3) 125 MCG
1 CAPSULE ORAL
Qty: 30 | Refills: 0 | OUTPATIENT
Start: 2018-06-01 | End: 2018-06-30

## 2018-06-01 RX ORDER — BUMETANIDE 0.25 MG/ML
1 INJECTION INTRAMUSCULAR; INTRAVENOUS
Qty: 60 | Refills: 0 | OUTPATIENT
Start: 2018-06-01 | End: 2018-06-30

## 2018-06-01 RX ORDER — LISINOPRIL 2.5 MG/1
1 TABLET ORAL
Qty: 0 | Refills: 0 | COMMUNITY

## 2018-06-01 RX ADMIN — Medication 175 MICROGRAM(S): at 06:17

## 2018-06-01 RX ADMIN — BUMETANIDE 1 MILLIGRAM(S): 0.25 INJECTION INTRAMUSCULAR; INTRAVENOUS at 06:17

## 2018-06-01 RX ADMIN — GABAPENTIN 100 MILLIGRAM(S): 400 CAPSULE ORAL at 12:48

## 2018-06-01 RX ADMIN — Medication 1 TABLET(S): at 06:20

## 2018-06-01 RX ADMIN — Medication 2: at 12:41

## 2018-06-01 RX ADMIN — MAGNESIUM OXIDE 400 MG ORAL TABLET 400 MILLIGRAM(S): 241.3 TABLET ORAL at 09:44

## 2018-06-01 RX ADMIN — MAGNESIUM OXIDE 400 MG ORAL TABLET 400 MILLIGRAM(S): 241.3 TABLET ORAL at 18:11

## 2018-06-01 RX ADMIN — FAMOTIDINE 20 MILLIGRAM(S): 10 INJECTION INTRAVENOUS at 18:11

## 2018-06-01 RX ADMIN — Medication 1 TABLET(S): at 12:47

## 2018-06-01 RX ADMIN — Medication 1000 UNIT(S): at 12:50

## 2018-06-01 RX ADMIN — CHLORHEXIDINE GLUCONATE 1 APPLICATION(S): 213 SOLUTION TOPICAL at 12:47

## 2018-06-01 RX ADMIN — VALSARTAN 80 MILLIGRAM(S): 80 TABLET ORAL at 09:44

## 2018-06-01 RX ADMIN — SODIUM CHLORIDE 3 MILLILITER(S): 9 INJECTION INTRAMUSCULAR; INTRAVENOUS; SUBCUTANEOUS at 06:01

## 2018-06-01 RX ADMIN — GABAPENTIN 100 MILLIGRAM(S): 400 CAPSULE ORAL at 06:17

## 2018-06-01 RX ADMIN — Medication 200 GRAM(S): at 09:44

## 2018-06-01 RX ADMIN — HEPARIN SODIUM 5000 UNIT(S): 5000 INJECTION INTRAVENOUS; SUBCUTANEOUS at 06:21

## 2018-06-01 RX ADMIN — Medication 81 MILLIGRAM(S): at 12:47

## 2018-06-01 RX ADMIN — BUMETANIDE 1 MILLIGRAM(S): 0.25 INJECTION INTRAMUSCULAR; INTRAVENOUS at 15:21

## 2018-06-01 RX ADMIN — Medication 50 MILLIGRAM(S): at 06:17

## 2018-06-01 RX ADMIN — SODIUM CHLORIDE 3 MILLILITER(S): 9 INJECTION INTRAMUSCULAR; INTRAVENOUS; SUBCUTANEOUS at 12:46

## 2018-06-01 RX ADMIN — FAMOTIDINE 20 MILLIGRAM(S): 10 INJECTION INTRAVENOUS at 06:17

## 2018-06-01 NOTE — PROGRESS NOTE ADULT - PROBLEM SELECTOR PLAN 1
- Dobutamine was stopped at 8 PM on 5/29   - [place] removed, will discuss starting coreg today once SBP consistently above 100  - Appreciate HF recs.

## 2018-06-01 NOTE — PROGRESS NOTE ADULT - SUBJECTIVE AND OBJECTIVE BOX
Patient denies any chest pain or shortness of breath OOB in chair    Clinically improving  diuresing well (-) 1830 cc yesterday       MEDICATIONS  (STANDING):  aspirin enteric coated 81 milliGRAM(s) Oral daily  buMETAnide 1 milliGRAM(s) Oral <User Schedule>  calcitriol   Capsule 0.5 MICROGram(s) Oral at bedtime  calcium carbonate 1250 mG + Vitamin D (OsCal 500 + D) 1 Tablet(s) Oral three times a day  chlorhexidine 4% Liquid 1 Application(s) Topical <User Schedule>  cholecalciferol 1000 Unit(s) Oral daily  dextrose 5%. 1000 milliLiter(s) (50 mL/Hr) IV Continuous <Continuous>  dextrose 50% Injectable 12.5 Gram(s) IV Push once  dextrose 50% Injectable 25 Gram(s) IV Push once  dextrose 50% Injectable 25 Gram(s) IV Push once  famotidine    Tablet 20 milliGRAM(s) Oral two times a day  gabapentin 100 milliGRAM(s) Oral every 8 hours  heparin  Injectable 5000 Unit(s) SubCutaneous every 12 hours  hydrALAZINE 50 milliGRAM(s) Oral three times a day  insulin lispro (HumaLOG) corrective regimen sliding scale   SubCutaneous three times a day before meals  insulin lispro (HumaLOG) corrective regimen sliding scale   SubCutaneous at bedtime  levothyroxine 175 MICROGram(s) Oral daily  magnesium oxide 400 milliGRAM(s) Oral two times a day with meals  metoprolol succinate ER 25 milliGRAM(s) Oral daily  sodium chloride 0.9% lock flush 3 milliLiter(s) IV Push every 8 hours  valsartan 80 milliGRAM(s) Oral every 12 hours    MEDICATIONS  (PRN):  acetaminophen   Tablet. 650 milliGRAM(s) Oral every 6 hours PRN Moderate Pain (4 - 6)  dextrose 40% Gel 15 Gram(s) Oral once PRN Blood Glucose LESS THAN 70 milliGRAM(s)/deciliter  glucagon  Injectable 1 milliGRAM(s) IntraMuscular once PRN Glucose LESS THAN 70 milligrams/deciliter      LABS:                        11.9   6.30  )-----------( 230      ( 2018 05:30 )             36.6     Hemoglobin: 11.9 g/dL ( @ 05:30)  Hemoglobin: 12.0 g/dL ( @ 04:30)  Hemoglobin: 11.5 g/dL ( @ 02:30)  Hemoglobin: 11.2 g/dL ( @ 04:45)  Hemoglobin: 10.8 g/dL ( @ 06:01)        134<L>  |  96<L>  |  17  ----------------------------<  119<H>  4.3   |  25  |  0.91    Ca    6.8<L>      2018 05:30  Phos  2.5       Mg     2.0         TPro  6.5  /  Alb  2.9<L>  /  TBili  2.7<H>  /  DBili  x   /  AST  69<H>  /  ALT  52<H>  /  AlkPhos  283<H>      Creatinine Trend: 0.91<--, 0.93<--, 0.93<--, 0.91<--, 0.89<--, 0.99<--           PHYSICAL EXAM  Vital Signs Last 24 Hrs  T(C): 36.9 (2018 08:00), Max: 36.9 (31 May 2018 16:00)  T(F): 98.4 (2018 08:00), Max: 98.4 (31 May 2018 16:00)  HR: 101 (2018 14:00) (91 - 113)  BP: 95/69 (2018 14:00) (84/56 - 115/92)  BP(mean): 75 (2018 14:00) (63 - 100)  RR: 20 (2018 14:00) (11 - 27)  SpO2: 100% (2018 14:00) (96% - 100%)      Cardiovascular: Normal S1 S2, No JVD, 1/6 ROSALINDA murmur  Respiratory: Lungs clear to auscultation, normal effort 	  Gastrointestinal:  Soft, Non-tender, + BS	  Extremities no c/c/+ edema B/LLE's [dependent position ]   Peripheral pulses palpable 2+ bilaterally    TELEMETRY: NSR, Biv-pacing, brief episode of NSVT 	       < from: TTE with Doppler (w/Cont) (. @ 20:04) >  CONCLUSIONS:  1. Mitral annular calcification, otherwise normal mitral  valve. Moderate mitral regurgitation.  2. Moderately dilated left atrium.  LA volume index = 47  cc/m2.  3. Mild left ventricular enlargement.  4. Severe global left ventricular systolic dysfunction.  Endocardial visualization enhanced with intravenous  injection of echo contrast (Definity).  No LV thrombus  seen.  5. Right ventricular enlargement with decreased right  ventricular systolic function.  A device wire is noted in  the right heart.  6. Estimated right ventricular systolic pressure equals 42  mm Hg, assuming right atrial pressure equals 10 mm Hg,  consistent with mild pulmonary hypertension.  7.Normal tricuspid valve.  Severe tricuspid regurgitation.  *** Compared with echocardiogram of 2017, left  ventricular systolic function has deteriorated.  ------------------------------------------------------------------------  Confirmed on  2018 - 13:50:18 by Xander Tyson M.D.    < end of copied text >      ASSESSMENT/PLAN:   62 year old Female with past medical history of hypertension, hyperlipidemia, chronic RBBB, obesity, thyroid CA s/p thyroidectomy, hypocalcemia (managed by OP endo Dr murray), nonischemic cardiomyopathy with an ejection fraction of 11% and  normal coronaries on left heart cardiac catheterization  , s/p [employer] Claria MRI CRT-ICD placement in 2017, admitted last month with acute on chronic systolic HF exacerbation, re admitted with acute on chronic systolic HF exacerbation, s/p RHC revealing low CI, trx to CCU for 	    - heart failure noted - Bumex 1 mg po BID  - diuresing well - continuing to monitor  strict I+Os  -on toprol , diovan and hydralazine   - hypocalcemia, Endo/renal  f/u cont to replete as required   - CCU care appreciated   -has appointment with Dr Robbins 18 @ 130 pm Patient denies any chest pain or shortness of breath OOB in chair    Clinically improving  diuresing well (-) 1830 cc yesterday       MEDICATIONS  (STANDING):  aspirin enteric coated 81 milliGRAM(s) Oral daily  buMETAnide 1 milliGRAM(s) Oral <User Schedule>  calcitriol   Capsule 0.5 MICROGram(s) Oral at bedtime  calcium carbonate 1250 mG + Vitamin D (OsCal 500 + D) 1 Tablet(s) Oral three times a day  chlorhexidine 4% Liquid 1 Application(s) Topical <User Schedule>  cholecalciferol 1000 Unit(s) Oral daily  dextrose 5%. 1000 milliLiter(s) (50 mL/Hr) IV Continuous <Continuous>  dextrose 50% Injectable 12.5 Gram(s) IV Push once  dextrose 50% Injectable 25 Gram(s) IV Push once  dextrose 50% Injectable 25 Gram(s) IV Push once  famotidine    Tablet 20 milliGRAM(s) Oral two times a day  gabapentin 100 milliGRAM(s) Oral every 8 hours  heparin  Injectable 5000 Unit(s) SubCutaneous every 12 hours  hydrALAZINE 50 milliGRAM(s) Oral three times a day  insulin lispro (HumaLOG) corrective regimen sliding scale   SubCutaneous three times a day before meals  insulin lispro (HumaLOG) corrective regimen sliding scale   SubCutaneous at bedtime  levothyroxine 175 MICROGram(s) Oral daily  magnesium oxide 400 milliGRAM(s) Oral two times a day with meals  metoprolol succinate ER 25 milliGRAM(s) Oral daily  sodium chloride 0.9% lock flush 3 milliLiter(s) IV Push every 8 hours  valsartan 80 milliGRAM(s) Oral every 12 hours    MEDICATIONS  (PRN):  acetaminophen   Tablet. 650 milliGRAM(s) Oral every 6 hours PRN Moderate Pain (4 - 6)  dextrose 40% Gel 15 Gram(s) Oral once PRN Blood Glucose LESS THAN 70 milliGRAM(s)/deciliter  glucagon  Injectable 1 milliGRAM(s) IntraMuscular once PRN Glucose LESS THAN 70 milligrams/deciliter      LABS:                        11.9   6.30  )-----------( 230      ( 2018 05:30 )             36.6     Hemoglobin: 11.9 g/dL ( @ 05:30)  Hemoglobin: 12.0 g/dL ( @ 04:30)  Hemoglobin: 11.5 g/dL ( @ 02:30)  Hemoglobin: 11.2 g/dL ( @ 04:45)  Hemoglobin: 10.8 g/dL ( @ 06:01)        134<L>  |  96<L>  |  17  ----------------------------<  119<H>  4.3   |  25  |  0.91    Ca    6.8<L>      2018 05:30  Phos  2.5       Mg     2.0         TPro  6.5  /  Alb  2.9<L>  /  TBili  2.7<H>  /  DBili  x   /  AST  69<H>  /  ALT  52<H>  /  AlkPhos  283<H>      Creatinine Trend: 0.91<--, 0.93<--, 0.93<--, 0.91<--, 0.89<--, 0.99<--           PHYSICAL EXAM  Vital Signs Last 24 Hrs  T(C): 36.9 (2018 08:00), Max: 36.9 (31 May 2018 16:00)  T(F): 98.4 (2018 08:00), Max: 98.4 (31 May 2018 16:00)  HR: 101 (2018 14:00) (91 - 113)  BP: 95/69 (2018 14:00) (84/56 - 115/92)  BP(mean): 75 (2018 14:00) (63 - 100)  RR: 20 (2018 14:00) (11 - 27)  SpO2: 100% (2018 14:00) (96% - 100%)      Cardiovascular: Normal S1 S2, No JVD, 1/6 ROSALINDA murmur  Respiratory: Lungs clear to auscultation, normal effort 	  Gastrointestinal:  Soft, Non-tender, + BS	  Extremities no c/c/+ edema B/LLE's [dependent position ]   Peripheral pulses palpable 2+ bilaterally    TELEMETRY: NSR, Biv-pacing, brief episode of NSVT 	       < from: TTE with Doppler (w/Cont) (. @ 20:04) >  CONCLUSIONS:  1. Mitral annular calcification, otherwise normal mitral  valve. Moderate mitral regurgitation.  2. Moderately dilated left atrium.  LA volume index = 47  cc/m2.  3. Mild left ventricular enlargement.  4. Severe global left ventricular systolic dysfunction.  Endocardial visualization enhanced with intravenous  injection of echo contrast (Definity).  No LV thrombus  seen.  5. Right ventricular enlargement with decreased right  ventricular systolic function.  A device wire is noted in  the right heart.  6. Estimated right ventricular systolic pressure equals 42  mm Hg, assuming right atrial pressure equals 10 mm Hg,  consistent with mild pulmonary hypertension.  7.Normal tricuspid valve.  Severe tricuspid regurgitation.  *** Compared with echocardiogram of 2017, left  ventricular systolic function has deteriorated.  ------------------------------------------------------------------------  Confirmed on  2018 - 13:50:18 by Xander Tyson M.D.    < end of copied text >      ASSESSMENT/PLAN:   62 year old Female with past medical history of hypertension, hyperlipidemia, chronic RBBB, obesity, thyroid CA s/p thyroidectomy, hypocalcemia (managed by OP endo Dr murray), nonischemic cardiomyopathy with an ejection fraction of 11% and  normal coronaries on left heart cardiac catheterization  , s/p [employer] Claria MRI CRT-ICD placement in 2017, admitted last month with acute on chronic systolic HF exacerbation, re admitted with acute on chronic systolic HF exacerbation, s/p RHC revealing low CI, trx to CCU for 	    - heart failure noted - Bumex 1 mg po BID  - diuresing well - continuing to monitor  strict I+Os  -on toprol , diovan and hydralazine   - hypocalcemia, Endo/renal  f/u cont to replete as required   -has appointment with Dr Robbins 18 @ 130 pm

## 2018-06-01 NOTE — PROGRESS NOTE ADULT - PROBLEM SELECTOR PLAN 2
- Endocrinology following, calcium continued to trend down with Ca today of 7.6 on [place], Vit D3 and calcitriol   - Repeat Vit D25 wnl, PTH borderline low normal.  - Patient is asymptomatic, QTc unreliable in the setting of pacemaker. Aggressive repletion to corrected total serum Ca 7.5 in the setting of loop diuresis.

## 2018-06-01 NOTE — PROGRESS NOTE ADULT - ASSESSMENT
62F with a past medical history of hypertension, hyperlipidemia, chronic RBBB,  obesity, thyroid CA, nonischemic cardiomyopathy with an ejection fraction of 11% and normal coronaries on recent cardiac catheterization at [facility] with Dr Chavarria , s/p [employer]tronic Sofi MRI CRT-ICD placement in June 2017, admitted last month with acute on chronic systolic HF exacerbation, presenting today with SOB, volume overload, acute on chronic systolic HF exacerbation. Gi called to assess for abdominal pain after taking Bumex and with increased LFTs. Pt known to our service from previous admission in May 2017 for persistent abdominal pain with nausea and found to have transaminitis. During that admission she underwent EGD with esophagitis and duodenopathy and MRCP was also obtained and did not show any evidence of biliary obstruction at that time. Further imaging with CT Abd showed 'Contracted gallbladder with intraluminal stones. Nonspecific gallbladder wall thickening or edema. Appearance is unchanged from the prior recent CT and MRI examinations.

## 2018-06-01 NOTE — PROGRESS NOTE ADULT - PROBLEM SELECTOR PROBLEM 1
Liver enzyme elevation
Liver enzyme elevation
Cardiogenic shock
Liver enzyme elevation
Acute on chronic systolic congestive heart failure
Cardiogenic shock
Hypothyroidism (acquired)
Acute on chronic systolic congestive heart failure
Cardiogenic shock

## 2018-06-01 NOTE — PROGRESS NOTE ADULT - SUBJECTIVE AND OBJECTIVE BOX
INTERVAL HPI/OVERNIGHT EVENTS:    pt in chair this morning   no abdominal pain "thats long gone"  no n/v    MEDICATIONS  (STANDING):  aspirin enteric coated 81 milliGRAM(s) Oral daily  buMETAnide 1 milliGRAM(s) Oral <User Schedule>  calcitriol   Capsule 0.5 MICROGram(s) Oral at bedtime  calcium carbonate 1250 mG + Vitamin D (OsCal 500 + D) 1 Tablet(s) Oral three times a day  carvedilol 3.125 milliGRAM(s) Oral every 12 hours  chlorhexidine 4% Liquid 1 Application(s) Topical <User Schedule>  cholecalciferol 1000 Unit(s) Oral daily  dextrose 5%. 1000 milliLiter(s) (50 mL/Hr) IV Continuous <Continuous>  dextrose 50% Injectable 12.5 Gram(s) IV Push once  dextrose 50% Injectable 25 Gram(s) IV Push once  dextrose 50% Injectable 25 Gram(s) IV Push once  famotidine    Tablet 20 milliGRAM(s) Oral two times a day  gabapentin 100 milliGRAM(s) Oral every 8 hours  heparin  Injectable 5000 Unit(s) SubCutaneous every 12 hours  hydrALAZINE 50 milliGRAM(s) Oral three times a day  insulin lispro (HumaLOG) corrective regimen sliding scale   SubCutaneous three times a day before meals  insulin lispro (HumaLOG) corrective regimen sliding scale   SubCutaneous at bedtime  levothyroxine 175 MICROGram(s) Oral daily  magnesium oxide 400 milliGRAM(s) Oral two times a day with meals  sodium chloride 0.9% lock flush 3 milliLiter(s) IV Push every 8 hours  valsartan 80 milliGRAM(s) Oral every 12 hours    MEDICATIONS  (PRN):  acetaminophen   Tablet. 650 milliGRAM(s) Oral every 6 hours PRN Moderate Pain (4 - 6)  dextrose 40% Gel 15 Gram(s) Oral once PRN Blood Glucose LESS THAN 70 milliGRAM(s)/deciliter  glucagon  Injectable 1 milliGRAM(s) IntraMuscular once PRN Glucose LESS THAN 70 milligrams/deciliter      Allergies    Isordil (Headache)  penicillin (Rash)    Intolerances        Review of Systems:    General:  No wt loss, fevers, chills, night sweats, fatigue   Eyes:  Good vision, no reported pain  ENT:  No sore throat, pain, runny nose, dysphagia  CV:  No pain, palpitations, hypo/hypertension  Resp:  No dyspnea, cough, tachypnea, wheezing  GI:  No pain, No nausea, No vomiting, No diarrhea, No constipation, No weight loss, No fever, No pruritis, No rectal bleeding, No melena, No dysphagia  :  No pain, bleeding, incontinence, nocturia  Muscle:  No pain, weakness  Neuro:  No weakness, tingling, memory problems  Psych:  No fatigue, insomnia, mood problems, depression  Endocrine:  No polyuria, polydypsia, cold/heat intolerance  Heme:  No petechiae, ecchymosis, easy bruisability  Skin:  No rash, tattoos, scars, edema      Vital Signs Last 24 Hrs  T(C): 36.9 (01 Jun 2018 08:00), Max: 36.9 (31 May 2018 16:00)  T(F): 98.4 (01 Jun 2018 08:00), Max: 98.4 (31 May 2018 16:00)  HR: 95 (01 Jun 2018 11:00) (90 - 113)  BP: 94/68 (01 Jun 2018 11:00) (84/56 - 115/92)  BP(mean): 74 (01 Jun 2018 11:00) (63 - 100)  RR: 26 (01 Jun 2018 11:00) (11 - 27)  SpO2: 100% (01 Jun 2018 11:00) (96% - 100%)    PHYSICAL EXAM:    Constitutional: NAD  HEENT: EOMI, throat clear  Neck: No LAD, supple  Respiratory: CTA and P  Cardiovascular: S1 and S2, RRR, no M  Gastrointestinal: BS+, soft, NT/ND, neg HSM,  Extremities: No peripheral edema, neg clubbing, cyanosis  Vascular: 2+ peripheral pulses  Neurological: A/O x 3, no focal deficits  Psychiatric: Normal mood, normal affect  Skin: No rashes      LABS:                        11.9   6.30  )-----------( 230      ( 01 Jun 2018 05:30 )             36.6     06-01    134<L>  |  96<L>  |  17  ----------------------------<  119<H>  4.3   |  25  |  0.91    Ca    6.8<L>      01 Jun 2018 05:30  Phos  2.5     06-01  Mg     2.0     06-01    TPro  6.5  /  Alb  2.9<L>  /  TBili  2.7<H>  /  DBili  x   /  AST  69<H>  /  ALT  52<H>  /  AlkPhos  283<H>  06-01          RADIOLOGY & ADDITIONAL TESTS:

## 2018-06-01 NOTE — PROGRESS NOTE ADULT - SUBJECTIVE AND OBJECTIVE BOX
Subjective: No chest pain or sob     acetaminophen   Tablet. 650 milliGRAM(s) Oral every 6 hours PRN  aspirin enteric coated 81 milliGRAM(s) Oral daily  buMETAnide 1 milliGRAM(s) Oral <User Schedule>  calcitriol   Capsule 0.5 MICROGram(s) Oral at bedtime  calcium carbonate 1250 mG + Vitamin D (OsCal 500 + D) 1 Tablet(s) Oral three times a day  carvedilol 3.125 milliGRAM(s) Oral every 12 hours  chlorhexidine 4% Liquid 1 Application(s) Topical <User Schedule>  cholecalciferol 1000 Unit(s) Oral daily  dextrose 40% Gel 15 Gram(s) Oral once PRN  dextrose 5%. 1000 milliLiter(s) IV Continuous <Continuous>  dextrose 50% Injectable 12.5 Gram(s) IV Push once  dextrose 50% Injectable 25 Gram(s) IV Push once  dextrose 50% Injectable 25 Gram(s) IV Push once  famotidine    Tablet 20 milliGRAM(s) Oral two times a day  gabapentin 100 milliGRAM(s) Oral every 8 hours  glucagon  Injectable 1 milliGRAM(s) IntraMuscular once PRN  heparin  Injectable 5000 Unit(s) SubCutaneous every 12 hours  hydrALAZINE 50 milliGRAM(s) Oral three times a day  insulin lispro (HumaLOG) corrective regimen sliding scale   SubCutaneous three times a day before meals  insulin lispro (HumaLOG) corrective regimen sliding scale   SubCutaneous at bedtime  levothyroxine 175 MICROGram(s) Oral daily  magnesium oxide 400 milliGRAM(s) Oral two times a day with meals  sodium chloride 0.9% lock flush 3 milliLiter(s) IV Push every 8 hours  valsartan 80 milliGRAM(s) Oral every 12 hours                            11.9   6.30  )-----------( 230      ( 01 Jun 2018 05:30 )             36.6       06-01    134<L>  |  96<L>  |  17  ----------------------------<  119<H>  4.3   |  25  |  0.91    Ca    6.8<L>      01 Jun 2018 05:30  Phos  2.5     06-01  Mg     2.0     06-01    TPro  6.5  /  Alb  2.9<L>  /  TBili  2.7<H>  /  DBili  x   /  AST  69<H>  /  ALT  52<H>  /  AlkPhos  283<H>  06-01            T(C): 36.9 (06-01-18 @ 08:00), Max: 36.9 (05-31-18 @ 16:00)  HR: 95 (06-01-18 @ 11:00) (92 - 113)  BP: 94/68 (06-01-18 @ 11:00) (84/56 - 115/92)  RR: 26 (06-01-18 @ 11:00) (11 - 27)  SpO2: 100% (06-01-18 @ 11:00) (96% - 100%)  Wt(kg): --    I&O's Summary    31 May 2018 07:01  -  01 Jun 2018 07:00  --------------------------------------------------------  IN: 720 mL / OUT: 2870 mL / NET: -2150 mL    01 Jun 2018 07:01  -  01 Jun 2018 12:43  --------------------------------------------------------  IN: 440 mL / OUT: 525 mL / NET: -85 mL        Heart: normal S1, S2, RRR, no m/r/g  Lungs: cta bilaterally  Abd: soft nT, nD  Ext: no edema     TELEMETRY: 	    ECG:  	  RADIOLOGY:   < from: Xray Chest 1 View- PORTABLE-Urgent (05.26.18 @ 10:32) >  INTERPRETATION:     May 26 at 7:15 AM:  [place] catheter seen with its tip in the right interlobar artery   slightly advanced since the last study.    Lungs are clear, heart is enlarged and no effusion or pneumothorax.    9:39 AM:  Tip of the [place] catheter has been pulled back so it is now in the   right main pulmonary artery segment. Left-sided AICD unchanged, stable   cardiomegaly and clear lungs.      COMPARISON:  May 25      IMPRESSION:  Follow-up studies post [place] catheter placement and     < end of copied text >    DIAGNOSTIC TESTING:  [ ] Echocardiogram: < from: TTE with Doppler (w/Cont) (04.26.18 @ 20:04) >  CONCLUSIONS:  1. Mitral annular calcification, otherwise normal mitral  valve. Moderate mitral regurgitation.  2. Moderately dilated left atrium.  LA volume index = 47  cc/m2.  3. Mild left ventricular enlargement.  4. Severe global left ventricular systolic dysfunction.  Endocardial visualization enhanced with intravenous  injection of echo contrast (Definity).  No LV thrombus  seen.  5. Right ventricular enlargement with decreased right  ventricular systolic function.  A device wire is noted in  the right heart.  6. Estimated right ventricular systolic pressure equals 42  mm Hg, assuming right atrial pressure equals 10 mm Hg,  consistent with mild pulmonary hypertension.  7.Normal tricuspid valve.  Severe tricuspid regurgitation.  *** Compared with echocardiogram of 5/16/2017, left  ventricular systolic function has deteriorated.    < end of copied text >    [ ]  Catheterization:  < from: Cardiac Cath Lab (01.08.14 @ 16:49) >  CORONARY VESSELS: The coronary circulation is co-dominant.  LM:   -- LM: Normal.  LAD:   --  LAD: Normal.  CX:   --  Circumflex: Normal.  RCA:   --  RCA: Normal.  COMPLICATIONS: There were no complications.    < end of copied text >    [ ] Stress Test:    OTHER: 	      ASSESSMENT/PLAN: 	62y Female with severe NICM s/p AICD, HTN, DM admitted with acute on chronic systolic heart failure.     -Pt. appears euvolemic  -continue with PO bumex  -swan removed   -coreg added today  -continue with ARB and afterload reduction with hydralazine (pt. unable to tolerate isordil due to headaches)  -follow up heart failure  -possible dc planning within next 24-48 hours  -Pt. has follow up appointment with Dr. Robbins on 06/04/18 at 1:30 pm    Kunal Muller MD

## 2018-06-01 NOTE — PROGRESS NOTE ADULT - PROBLEM SELECTOR PLAN 4
Continue synthroid.     ppx: HSQ    Dispo: transfer to floor s/p swan removal. likely d/c within 24 hours.    Ann-Marie Langford MD   [facility] Internal Medicine   Pager # [phone]

## 2018-06-01 NOTE — PROGRESS NOTE ADULT - SUBJECTIVE AND OBJECTIVE BOX
Patient is a 62y old  Female who presents with a chief complaint of Body swelling x 4 weeks (31 May 2018 13:42)    Event	  HPI:  62F with a past medical history of hypertension, hyperlipidemia, chronic RBBB,  obesity, thyroid CA, nonischemic cardiomyopathy with an ejection fraction of 11% and normal coronaries on recent cardiac catheterization at [facility] with Dr Chavarria , s/p Medtronic Sofi MRI CRT-ICD placement in June 2017, admitted last month with acute on chronic systolic HF exacerbation, presenting today with SOB, volume overload, acute on chronic systolic HF exacerbation.  She reports compliance with diet and meds, abdominal bloating, EDDY after a few steps, orthopnea cough with yellow phlegm, substernal chest pain associated with cough.  Denies nausea, vomit chills, diaphoresis,   She has seen her OP Cardiologist once since DC.  In the Ed, the pt was given Lasix 40 mg IV x 1 with positive urine output. (23 May 2018 19:57)    Overnight:   In past 24 hrs swan removed and patient transitioned to PO bumex. Currently feels well however still endorsing b/l leg pain and stomach soreness.     MEDICATIONS  (STANDING):  aspirin enteric coated 81 milliGRAM(s) Oral daily  buMETAnide 1 milliGRAM(s) Oral <User Schedule>  calcitriol   Capsule 0.5 MICROGram(s) Oral at bedtime  calcium carbonate 1250 mG + Vitamin D (OsCal 500 + D) 1 Tablet(s) Oral three times a day  chlorhexidine 4% Liquid 1 Application(s) Topical <User Schedule>  cholecalciferol 1000 Unit(s) Oral daily  dextrose 5%. 1000 milliLiter(s) (50 mL/Hr) IV Continuous <Continuous>  dextrose 50% Injectable 12.5 Gram(s) IV Push once  dextrose 50% Injectable 25 Gram(s) IV Push once  dextrose 50% Injectable 25 Gram(s) IV Push once  famotidine    Tablet 20 milliGRAM(s) Oral two times a day  gabapentin 100 milliGRAM(s) Oral every 8 hours  heparin  Injectable 5000 Unit(s) SubCutaneous every 12 hours  hydrALAZINE 50 milliGRAM(s) Oral three times a day  insulin lispro (HumaLOG) corrective regimen sliding scale   SubCutaneous three times a day before meals  insulin lispro (HumaLOG) corrective regimen sliding scale   SubCutaneous at bedtime  levothyroxine 175 MICROGram(s) Oral daily  magnesium oxide 400 milliGRAM(s) Oral two times a day with meals  sodium chloride 0.9% lock flush 3 milliLiter(s) IV Push every 8 hours  valsartan 80 milliGRAM(s) Oral every 12 hours    MEDICATIONS  (PRN):  acetaminophen   Tablet. 650 milliGRAM(s) Oral every 6 hours PRN Moderate Pain (4 - 6)  dextrose 40% Gel 15 Gram(s) Oral once PRN Blood Glucose LESS THAN 70 milliGRAM(s)/deciliter  glucagon  Injectable 1 milliGRAM(s) IntraMuscular once PRN Glucose LESS THAN 70 milligrams/deciliter      REVIEW OF SYSTEMS:  Otherwise, 10 point ROS done and otherwise negative.    PHYSICAL EXAM:  Vital Signs Last 24 Hrs  T(C): 36.7 (01 Jun 2018 04:00), Max: 36.9 (31 May 2018 16:00)  T(F): 98 (01 Jun 2018 04:00), Max: 98.4 (31 May 2018 16:00)  HR: 99 (01 Jun 2018 06:00) (88 - 113)  BP: 104/77 (01 Jun 2018 06:00) (84/56 - 115/92)  BP(mean): 83 (01 Jun 2018 06:00) (63 - 100)  RR: 16 (01 Jun 2018 06:00) (10 - 27)  SpO2: 100% (01 Jun 2018 06:00) (96% - 100%)  I&O's Summary    31 May 2018 07:01  -  01 Jun 2018 07:00  --------------------------------------------------------  IN: 720 mL / OUT: 2870 mL / NET: -2150 mL    Appearance: Elderly female, NAD	  HEENT:   Normal oral mucosa, PERRL, EOMI	  Lymphatic: No lymphadenopathy  Cardiovascular: Normal S1 and S2, with regular rhythm  Respiratory: Lungs clear to auscultation, no wheezes/ rales or rhonchi 	  Psychiatry: A & O x 3, Mood & affect appropriate  Gastrointestinal:  Soft, Non-tender, + BS. Umbilical hernia appreciated on exam   Skin: No rashes, No ecchymoses, No cyanosis	  Neurologic: Non-focal  Extremities: Normal range of motion, trace edema   Vascular: Peripheral pulses palpable 2+ bilaterally    LABS:	 	                        11.9   6.30  )-----------( 230      ( 01 Jun 2018 05:30 )             36.6     Auto Eosinophil # x     / Auto Eosinophil % x     / Auto Neutrophil # x     / Auto Neutrophil % x     / BANDS % x                            12.0   6.35  )-----------( 196      ( 31 May 2018 04:30 )             36.8     Auto Eosinophil # x     / Auto Eosinophil % x     / Auto Neutrophil # x     / Auto Neutrophil % x     / BANDS % x          06-01    134<L>  |  96<L>  |  17  ----------------------------<  119<H>  4.3   |  25  |  0.91  05-31    134<L>  |  91<L>  |  15  ----------------------------<  156<H>  4.0   |  29  |  0.93  05-30    133<L>  |  91<L>  |  12  ----------------------------<  197<H>  3.7   |  31  |  0.93    Ca    6.8<L>      01 Jun 2018 05:30  Mg     2.0     06-01  Phos  2.5     06-01  TPro  6.5  /  Alb  2.9<L>  /  TBili  2.7<H>  /  DBili  x   /  AST  69<H>  /  ALT  52<H>  /  AlkPhos  283<H>  06-01  TPro  6.8  /  Alb  3.0<L>  /  TBili  3.1<H>  /  DBili  x   /  AST  68<H>  /  ALT  52<H>  /  AlkPhos  274<H>  05-31        proBNP:   Lipid Profile: 05-24 Chol 99<L> LDL 64 HDL 37<L> Trig 38, 04-19 Chol 100<L> LDL 64 HDL 34<L> Trig 48  HgA1c: 6.8 % (04-19 @ 05:45)    TSH:     CARDIAC MARKERS:   24 May 2018 07:15 Troponin < 0.06 ng/mL / Creatine Kinase 500 u/L /  CKMB x     / CPK Mass Assay % x       24 May 2018 01:20 Troponin < 0.06 ng/mL / Creatine Kinase 483 u/L /  CKMB x     / CPK Mass Assay % x       23 May 2018 15:40 Troponin < 0.06 ng/mL / Creatine Kinase 559 u/L /  CKMB x     / CPK Mass Assay % x            TELEMETRY: 	    ECG:  	  RADIOLOGY:  OTHER: 	    PREVIOUS DIAGNOSTIC TESTING:    [ ] Echocardiogram:  [ ]  Catheterization:  [ ] Stress Test:

## 2018-06-01 NOTE — PROGRESS NOTE ADULT - ATTENDING COMMENTS
Agree with above assessment and plan as outlined above.    - progressing well  - hold Negative ionotropes for now  - cont afterload reducers    Augusto Grimes MD, FACC
EP ATTENDING    Agree with above. Admitted with acute on chronic systolic CHF from a known NICM s/p Medtronic BiV-ICD. EKG shows adequate BiV (i.e. LV) pacing. Interrogation in April 19, 2018 unremarkable. Medical management of CHF as per cardiology. No further inpatient EP workup needed.  Right heart cath as per interventional cardiology.
EP ATTENDING    Agree with above. No further inpatient EP workup needed. F/U cardiology and CHF.
EP ATTENDING    Agree with above. Supportive care as per CHF, cardiology and CCU. No further inpatient EP workup needed as long as VT remains non-sustained not requiring high voltage ICD therapy.
EP ATTENDING    Patient seen and examined. Agree with above. Admitted with acute on chronic systolic CHF from a known NICM s/p Medtronic BiV-ICD. EKG shows adequate BiV (i.e. LV) pacing. Interrogation in April 19, 2018 unremarkable. Medical management of CHF as per cardiology. No further inpatient EP workup needed.
Patient seen and examined, agree with above assessment and plan as transcribed above.    - Cnt to keep O>I  - EP f/u to optimize pacing    Augusto Grimes MD, FACC
Patient seen and examined, agree with above assessment and plan as transcribed above.    - progressing well  - now off     Augusto Grimes MD, FACC
Agree with above assessment and plan as outlined above.    - tolerating BB  - D/C planning      Augusto Grimes MD, FACC
Agree with above.   -continue with  assisted diuresis    Kunal Muller MD
EP ATTENDING    Agree with above. 12-lead EKG shows good BiV-pacing, and device interrogation unremarkable last month. No further inpatient EP workup needed.
EP ATTENDING    Agree with above. No further inpatient EP workup needed. Would transfer to [place] [facility] for advanced CHF therapies. D/W CHF attending Dr. Jayce Schmidt.
Patient seen and examined, agree with above assessment and plan as transcribed above.    - PO Bumex, as filling pressures still elevated  - cont  for now  -hope to wean dobutamine in next 24-48hrs    Augusto Grmies MD, FACC
She is feeling great. Has tolerated oral meds, but has had a few low BPs. Currently on valsartan 80 mg BID, bumex 1 mg BID, hydralazine 50 mg TID and off Isodil due to headache. The PAC was removed yesterday and she has ambulated in the CCU without SOB.    Please start Toprol XL 25 mg po daily. First dose now.  OK from my perspective for her to go [place] today. F/U with HF clinic on Monday.
 is off. Good hemodynamics.  Feels well. However, transaminases slightly up. Will monitor.  Consider starting Coreg 3.125 bid tomorrow.
Did well overnight.     1. Schedule bumex 1 mg po BID at 7 am & 3 pm and redose at 10 pm if CVP > 8.  2. Stop  now, please.  3. Increase valsartan to 80 mg po BID tonight. Hold for SBP < 90.  4. Continue hydralazine 50 mg po TID. Hold for SBP < 90.    Please check PA saturation this evening and again tomorrow morning. Anticipate that we will remove the [place] tomorrow. The goal is to push the vasodilators, as tolerated, and to make sure she has close follow-up with the [facility] outpatient HF team.
Patient known to me from a prior admission. She has diuresed well with , but her filling pressures are still elevated:  RA 12, PA 42/24/31, PCW 21, CO/CI 6.7/3.0, PA 69% on  2.5 mcg/kg/min.    1. Give bumex 1 mg po now and repeat the dose tomorrow if her CVP > 8.  2. Reduce  to 1 mcg/kg/min now, send PA saturation in 2h, if it is >60%, discontinue  tonight.  3. Start valsartan 40 mg po BID tonight. Hold for SBP < 90.  4. OK to stop Isordil given her complaint of steady headache.  5. Continue hydralazine 50 mg po TID. Hold for SBP < 90.    Anticipate that we will remove the [place] in 24-36 hours once she is off  for a period of time and looking reasonably well. The goal is to push the vasodilators, as tolerated, and to make sure she has close follow-up with the [facility] outpatient HF team.
Advanced care planning was discussed with patient and family.  Advanced care planning forms were reviewed and discussed.  Risks, benefits and alternatives of gastroenterologic procedures were discussed in detail and all questions were answered.    25 minutes spent.
Pearl Carson is a 62 year old woman with severe NICM, s/p Medtronic  ICD (EF 11%), HTN, DM, history of thyroid CA, and obesity who was admitted with acute on chronic systolic heart failure and transferred to the CCU for further management of cardiogenic shock with inotrope assisted diuresis. TTE from 4/26/18 noted moderate MR, severe global LV systolic dysfunction with EF 11%, RV enlargement with decreased function and mild pulmonary hypertension with estimated RVSP 42 mm Hg. Right heart catheterization done 5/25/18 noted PCWP 37 mm Hg, CO 2.9 mL/min and CI 1.32 mL/min/sqm. SVR was 1765 dynes sec cm(-5).  [place] Radha catheter was repositioned on 5/26/18 and confirmed good by CXR. Thermodilution  CO 2.9 L/min and CI 1.3 L/min/sqm, CVP 24 mmHg, PCWP 27 mmHg, SVR 1920 dynes sec cm(-5), on dobutamine IV 5 mcg/kg/min  (17.055 mL/Hr) and bumetanide (Bumex)  IV 1 mG/Hr (10 mL/Hr). Other regimen:  EC aspirin 81 mg daily, calcitriol capsule 0.25 mcg daily at bedtime, calcium acetate 1334 mg three times a day with meals, calcium carbonate 500 mG (Tums) Chewable 1 Tablet(s) Chew two times a day, cholecalciferol 1000 Unit(s) by mouth once daily, colchicine 0.6 mg twice daily, famotidine  20 mg twice daily, heparin  Injectable 5000 Unit(s) subcutaneously every 12 hours, hydralazine 10 mg every 8 hours, levothyroxine 150 mcgl daily and  magnesium oxide mg twice daily with meals.
Pearl Carson is a 62 year old woman with severe NICM, s/p Medtronic  ICD (EF 11%), HTN, DM, history of thyroid CA, and obesity who was admitted with acute on chronic systolic heart failure and transferred to the CCU for further management of cardiogenic shock with inotrope assisted diuresis. TTE from 4/26/18 noted moderate MR, severe global LV systolic dysfunction with EF 11%, RV enlargement with decreased function and mild pulmonary hypertension with estimated RVSP 42 mm Hg. Right heart catheterization done 5/25/18 noted PCWP 37 mm Hg, CO 2.9 mL/min and CI 1.32 mL/min/sqm. SVR was 1765 dynes sec cm(-5).  [place] Radha catheter was repositioned on 5/26/18 and confirmed good by CXR. Thermodilution  CO 5.3 L/min on 5/27/18; 2.9 L/min on 5/26/18.  CI 2.3 L/min/sqm on 5/27/18; 1.3 L/min/sqm on 5/26/18, CVP 9 – 18 mm Hg on 5/27/18; 24 mmHg on 5/26/18. PCWP 27 mmHg,  dynes sec cm(-5) on 5/27/18; 1920 dynes sec cm(-5) on 5/26/18.  There was 9.7L output over past 24 hours as of 5/27/18. Wt. down to 101 kg on 5/27/18; 107.3 on 5/26/18. Maintaining dobutamine IV 5 mcg/kg/min  (17.055 mL/Hr) and bumetanide (Bumex)  IV 1 mG/Hr (10 mL/Hr). Other regimen:  EC aspirin 81 mg daily, calcitriol capsule 0.25 mcg daily at bedtime, calcium acetate 1334 mg three times a day with meals, calcium carbonate 500 mG (Tums) Chewable 1 Tablet(s) Chew two times a day, cholecalciferol 1000 Unit(s) by mouth once daily, colchicine 0.6 mg twice daily, famotidine  20 mg twice daily, heparin  Injectable 5000 Unit(s) subcutaneously every 12 hours, hydralazine 10 mg every 8 hours, levothyroxine 150 mcgl daily and  magnesium oxide mg twice daily with meals
Pearl Carson is a 62 year old woman with severe NICM, s/p Medtronic  ICD (EF 11%), HTN, DM, history of thyroid CA, and obesity who was admitted with acute on chronic systolic heart failure and transferred to the CCU for further management of cardiogenic shock with inotrope assisted diuresis. TTE from 4/26/18 noted moderate MR, severe global LV systolic dysfunction with EF 11%, RV enlargement with decreased function and mild pulmonary hypertension with estimated RVSP 42 mm Hg. Right heart catheterization done 5/25/18 noted PCWP 37 mm Hg, CO 2.9 mL/min and CI 1.32 mL/min/sqm. SVR was 1765 dynes sec cm(-5).  [place] Radha catheter was repositioned on 5/26/18 and confirmed good by CXR. Thermodilution  CO 5.1 L/min on 5/28/18; 5.3 L/min on 5/27/18; 2.9 L/min on 5/26/18.  CI 2.6 L/min/sqm on 5/28/18; 2.3 L/min/sqm on 5/27/18; 1.3 L/min/sqm on 5/26/18, CVP 16 mm Hg on 5/28/18; 9 – 18 mm Hg on 5/27/18; 24 mmHg on 5/26/18. PCWP 32 mm Hg on 5/28/18; 27 mmHg on 5/27/18.   dynes sec cm(-5) on 5/28/18 and 5/27/18; 1920 dynes sec cm(-5) on 5/26/18.  Weight is down to 98 kg on 5/28/18;  101 kg on 5/27/18; 107.3 on 5/26/18. Maintaining dobutamine IV 5 mcg/kg/min  (17.055 mL/Hr). Discontinuing bumetanide (Bumex)  IV 1 mG/Hr (10 mL/Hr) as of 5/28/18. Other regimen:  EC aspirin 81 mg daily, calcitriol capsule 0.25 mcg daily at bedtime, calcium acetate 1334 mg three times a day with meals, calcium carbonate 500 mG (Tums) Chewable 1 Tablet(s) Chew two times a day, cholecalciferol 1000 Unit(s) by mouth once daily, colchicine 0.6 mg twice daily, famotidine  20 mg twice daily, heparin  Injectable 5000 Unit(s) subcutaneously every 12 hours, hydralazine 50 mg every 8 hours, (dose increased from 25 mg) levothyroxine 150 mcg daily and  magnesium oxide mg twice daily with meals.

## 2018-06-01 NOTE — PROGRESS NOTE ADULT - SUBJECTIVE AND OBJECTIVE BOX
Chief complaint  Patient is a 62y old  Female who presents with a chief complaint of Body swelling x 4 weeks (31 May 2018 13:42)   Review of systems  Patient in bed, looks comfortable, no fever, no hypoglycemia.    Labs and Fingersticks  CAPILLARY BLOOD GLUCOSE      POCT Blood Glucose.: 155 mg/dL (01 Jun 2018 12:35)  POCT Blood Glucose.: 118 mg/dL (01 Jun 2018 08:11)  POCT Blood Glucose.: 281 mg/dL (31 May 2018 22:48)  POCT Blood Glucose.: 120 mg/dL (31 May 2018 18:01)          Calcium, Total Serum: 6.8 <L> (06-01 @ 05:30)  Calcium, Total Serum: 7.0 <L> (05-31 @ 04:30)  Albumin, Serum: 2.9 <L> (06-01 @ 05:30)  Albumin, Serum: 3.0 <L> (05-31 @ 04:30)    Alanine Aminotransferase (ALT/SGPT): 52 <H> (06-01 @ 05:30)  Alanine Aminotransferase (ALT/SGPT): 52 <H> (05-31 @ 04:30)  Alkaline Phosphatase, Serum: 283 <H> (06-01 @ 05:30)  Alkaline Phosphatase, Serum: 274 <H> (05-31 @ 04:30)  Aspartate Aminotransferase (AST/SGOT): 69 <H> (06-01 @ 05:30)  Aspartate Aminotransferase (AST/SGOT): 68 <H> (05-31 @ 04:30)        06-01    134<L>  |  96<L>  |  17  ----------------------------<  119<H>  4.3   |  25  |  0.91    Ca    6.8<L>      01 Jun 2018 05:30  Phos  2.5     06-01  Mg     2.0     06-01    TPro  6.5  /  Alb  2.9<L>  /  TBili  2.7<H>  /  DBili  x   /  AST  69<H>  /  ALT  52<H>  /  AlkPhos  283<H>  06-01                        11.9   6.30  )-----------( 230      ( 01 Jun 2018 05:30 )             36.6     Medications  MEDICATIONS  (STANDING):  aspirin enteric coated 81 milliGRAM(s) Oral daily  buMETAnide 1 milliGRAM(s) Oral <User Schedule>  calcitriol   Capsule 0.5 MICROGram(s) Oral at bedtime  calcium carbonate 1250 mG + Vitamin D (OsCal 500 + D) 1 Tablet(s) Oral three times a day  chlorhexidine 4% Liquid 1 Application(s) Topical <User Schedule>  cholecalciferol 1000 Unit(s) Oral daily  dextrose 5%. 1000 milliLiter(s) (50 mL/Hr) IV Continuous <Continuous>  dextrose 50% Injectable 12.5 Gram(s) IV Push once  dextrose 50% Injectable 25 Gram(s) IV Push once  dextrose 50% Injectable 25 Gram(s) IV Push once  famotidine    Tablet 20 milliGRAM(s) Oral two times a day  gabapentin 100 milliGRAM(s) Oral every 8 hours  heparin  Injectable 5000 Unit(s) SubCutaneous every 12 hours  hydrALAZINE 50 milliGRAM(s) Oral three times a day  insulin lispro (HumaLOG) corrective regimen sliding scale   SubCutaneous three times a day before meals  insulin lispro (HumaLOG) corrective regimen sliding scale   SubCutaneous at bedtime  levothyroxine 175 MICROGram(s) Oral daily  magnesium oxide 400 milliGRAM(s) Oral two times a day with meals  metoprolol succinate ER 25 milliGRAM(s) Oral daily  sodium chloride 0.9% lock flush 3 milliLiter(s) IV Push every 8 hours  valsartan 80 milliGRAM(s) Oral every 12 hours      Physical Exam  General: Patient comfortable in bed  Vital Signs Last 12 Hrs  T(F): 98 (06-01-18 @ 12:00), Max: 98.4 (06-01-18 @ 08:00)  HR: 96 (06-01-18 @ 15:00) (91 - 102)  BP: 86/50 (06-01-18 @ 15:00) (84/56 - 110/75)  BP(mean): 58 (06-01-18 @ 15:00) (58 - 83)  RR: 20 (06-01-18 @ 15:00) (13 - 26)  SpO2: 100% (06-01-18 @ 14:00) (98% - 100%)  Neck: No palpable thyroid nodules.  CVS: S1S2, No murmurs  Respiratory: No wheezing, no crepitations  GI: Abdomen soft, bowel sounds positive  Musculoskeletal:  edema lower extremities.   Skin: No skin rashes, no ecchymosis    Diagnostics

## 2018-06-01 NOTE — PROGRESS NOTE ADULT - PROBLEM SELECTOR PLAN 1
-No longer in cardiogenic shock.  -5/31/18 CVP 9, PCWP 16, PA 38/22/28, PA sat 68.7%, CO 6.58, CI 2.93. [place] was removed.   -Got 2 doses of Bumex 1 mg yesterday, diuresed well -2.1 L.   -Agree to continue w/ Bumex 1 mg po BID.   -Renal function stable BUN/Cr 17/0.91.   -Continue hydral 50 mg po TID, valsartan 80 mg po BID hold only if SBP <90. Will hold off on uptitration of valsartan today as SBP borderline low. Plans are to get her on Entresto as an outpatient.   -Ok to start Coreg 3.125 mg po BID today.  -D/c planning for Saturday?

## 2018-06-01 NOTE — PROGRESS NOTE ADULT - SUBJECTIVE AND OBJECTIVE BOX
Patient seen and examined. She feels excellent. No acute SOB, orthopnea, PND, CP, palpitations, dizziness or syncope. Sitting up in chair today.     Medications:  acetaminophen   Tablet. 650 milliGRAM(s) Oral every 6 hours PRN  aspirin enteric coated 81 milliGRAM(s) Oral daily  buMETAnide 1 milliGRAM(s) Oral <User Schedule>  calcitriol   Capsule 0.5 MICROGram(s) Oral at bedtime  calcium carbonate 1250 mG + Vitamin D (OsCal 500 + D) 1 Tablet(s) Oral three times a day  chlorhexidine 4% Liquid 1 Application(s) Topical <User Schedule>  cholecalciferol 1000 Unit(s) Oral daily  dextrose 40% Gel 15 Gram(s) Oral once PRN  dextrose 5%. 1000 milliLiter(s) IV Continuous <Continuous>  dextrose 50% Injectable 12.5 Gram(s) IV Push once  dextrose 50% Injectable 25 Gram(s) IV Push once  dextrose 50% Injectable 25 Gram(s) IV Push once  famotidine    Tablet 20 milliGRAM(s) Oral two times a day  gabapentin 100 milliGRAM(s) Oral every 8 hours  glucagon  Injectable 1 milliGRAM(s) IntraMuscular once PRN  heparin  Injectable 5000 Unit(s) SubCutaneous every 12 hours  hydrALAZINE 50 milliGRAM(s) Oral three times a day  insulin lispro (HumaLOG) corrective regimen sliding scale   SubCutaneous three times a day before meals  insulin lispro (HumaLOG) corrective regimen sliding scale   SubCutaneous at bedtime  levothyroxine 175 MICROGram(s) Oral daily  magnesium oxide 400 milliGRAM(s) Oral two times a day with meals  sodium chloride 0.9% lock flush 3 milliLiter(s) IV Push every 8 hours  valsartan 80 milliGRAM(s) Oral every 12 hours      Vitals:  Vital Signs Last 24 Hrs  T(C): 36.9 (2018 08:00), Max: 36.9 (31 May 2018 16:00)  T(F): 98.4 (2018 08:00), Max: 98.4 (31 May 2018 16:00)  HR: 100 (2018 09:00) (90 - 113)  BP: 103/73 (2018 08:00) (84/56 - 115/92)  BP(mean): 79 (2018 08:00) (63 - 100)  RR: 16 (2018 09:00) (11 - 27)  SpO2: 99% (2018 09:00) (96% - 100%)    Daily     Daily Weight in k.8 (2018 06:00)    I&O's Detail    31 May 2018 07:01  -  2018 07:00  --------------------------------------------------------  IN:    Oral Fluid: 720 mL  Total IN: 720 mL    OUT:    Indwelling Catheter - Urethral: 2870 mL  Total OUT: 2870 mL    Total NET: -2150 mL      2018 07:01  -  2018 10:53  --------------------------------------------------------  IN:    Oral Fluid: 240 mL  Total IN: 240 mL    OUT:    Indwelling Catheter - Urethral: 275 mL  Total OUT: 275 mL    Total NET: -35 mL          Physical Exam:     General: No distress. Comfortable.  HEENT: EOM intact.  Neck: Neck supple. JVP not elevated. No masses  Chest: Clear to auscultation bilaterally  CV: Normal S1 and S2. +ROSALINDA. No rub, or gallops. Radial pulses normal. Trace b/l LE edema.   Abdomen: Soft, non-distended, non-tender  Skin: No rashes or skin breakdown  Neurology: Alert and oriented times three. Sensation intact  Psych: Affect normal    Labs:                        11.9   6.30  )-----------( 230      ( 2018 05:30 )             36.6     06-01    134<L>  |  96<L>  |  17  ----------------------------<  119<H>  4.3   |  25  |  0.91    Ca    6.8<L>      2018 05:30  Phos  2.5     06-01  Mg     2.0     06-    TPro  6.5  /  Alb  2.9<L>  /  TBili  2.7<H>  /  DBili  x   /  AST  69<H>  /  ALT  52<H>  /  AlkPhos  283<H>      < from: TTE with Doppler (w/Cont) (18 @ 20:04) >  DIMENSIONS:  Dimensions:     Normal Values:  LA:     4.7 cm    2.0 - 4.0 cm  Ao:     3.3 cm    2.0 - 3.8 cm  SEPTUM: 0.9 cm    0.6 - 1.2 cm  PWT:    0.8 cm    0.6 - 1.1 cm  LVIDd:  6.3 cm    3.0 - 5.6 cm  LVIDs:  6.0 cm    1.8 -4.0 cm  Derived Variables:  LVMI: 98 g/m2  RWT: 0.25  Fractional short: 5 %  Ejection Fraction (Teicholtz): 11 %  ------------------------------------------------------------------------  OBSERVATIONS:  Mitral Valve: Mitral annular calcification, otherwise  normal mitral valve. Moderate mitral regurgitation.  Aortic Root: Normal aortic root.  Aortic Valve: Calcified trileaflet aortic valve with  decreased opening.  Left Atrium: Moderately dilated left atrium.  LA volume  index = 47 cc/m2.  Left Ventricle: Severe global left ventricular systolic  dysfunction.  Endocardial visualization enhanced with  intravenous injection of echo contrast (Definity).  No LV  thrombus seen. Mild left ventricular enlargement.  Right Heart: Moderate right atrial enlargement. Right  ventricular enlargement with decreased right ventricular  systolic function.  A device wire is noted in the right  heart. Normal tricuspid valve.  Severe tricuspid  regurgitation. Normal pulmonic valve.  Mild pulmonic  regurgitation.  Pericardium/PleuraNormal pericardium with no pericardial  effusion.  Hemodynamic: Estimated right ventricular systolic pressure  equals 42 mm Hg, assuming right atrial pressure equals 10  mm Hg, consistent with mild pulmonary hypertension.  ------------------------------------------------------------------------    < end of copied text >

## 2018-06-01 NOTE — PROGRESS NOTE ADULT - ASSESSMENT
Ms. Carson is a 62 year old woman with acute on chronic systolic heart failure due to a nonischemic cardiomyopathy with a severely dilated left ventricle and severely decreased LV ejection fraction currently in cardiogenic shock requiring IV inotropic support. Her abdominal pain is likely reflective of her low cardiac output. She is urinating well on the current support. She has had multiple hospitalizations and is at high risk for further decline and death. Dr. Mckeon offered transfer to [facility] for evaluation for advanced therapies, but the family would like to wait to be transferred. She has responded well to dobutamine and bumex gtt. Dobutamine was turned off on 5/29/18, but was restarted early Am on 5/30/18 due to low PA sat. CVP on 5/30/18 was still elevated at 12, Bumex 1 mg po qd was given again. 5/31/18 CVP 9, PCWP 16, PA 38/22/28, PA sat 68.7%, CO 6.58, CI 2.93. [place] was removed.     Appears euvolemic today.

## 2018-06-01 NOTE — PROGRESS NOTE ADULT - SUBJECTIVE AND OBJECTIVE BOX
Nephrology Progress Note    No acute events overnight  Patient seen and evaluated this morning; no new complaints, improving sob      PHYSICAL EXAM    Vital Signs Last 24 Hrs  T(C): 36.9 (01 Jun 2018 08:00), Max: 36.9 (31 May 2018 16:00)  T(F): 98.4 (01 Jun 2018 08:00), Max: 98.4 (31 May 2018 16:00)  HR: 95 (01 Jun 2018 11:00) (90 - 113)  BP: 94/68 (01 Jun 2018 11:00) (84/56 - 115/92)  BP(mean): 74 (01 Jun 2018 11:00) (63 - 100)  RR: 26 (01 Jun 2018 11:00) (11 - 27)  SpO2: 100% (01 Jun 2018 11:00) (96% - 100%)  I&O's Summary    31 May 2018 07:01  -  01 Jun 2018 07:00  --------------------------------------------------------  IN: 720 mL / OUT: 2870 mL / NET: -2150 mL    01 Jun 2018 07:01  -  01 Jun 2018 11:59  --------------------------------------------------------  IN: 440 mL / OUT: 525 mL / NET: -85 mL    GA: awake and alert, no distress  EYES: EOMI, clear conj, anicteric sclera  ENT: MMM; clear OP, neck supple  LUNGS: CTABL, no wrc, normal effort  HEART: S1S2 normal, no mrg, no peripheral edema  ABD: soft, NT/ND/BS+, no peritoneal signs  EXT: well perfused, no edema or cyanosis  NEURO: AAO x 3, sensation and motor intact  SKIN: warm and dry, no rash on visible skin    LABORATORY DATA:                        11.9   6.30  )-----------( 230      ( 01 Jun 2018 05:30 )             36.6     06-01    134<L>  |  96<L>  |  17  ----------------------------<  119<H>  4.3   |  25  |  0.91    Ca    6.8<L>      01 Jun 2018 05:30  Phos  2.5     06-01  Mg     2.0     06-01    TPro  6.5  /  Alb  2.9<L>  /  TBili  2.7<H>  /  DBili  x   /  AST  69<H>  /  ALT  52<H>  /  AlkPhos  283<H>  06-01    IMAGING:  < from: Xray Chest 1 View- PORTABLE-Routine (05.29.18 @ 07:09) >  IMPRESSION:   Hobson-Radha catheter in place.  Clear lungs. Cardiomegaly.    < end of copied text >    ASSESSMENT: This is a 62 year old women with PMHx of HTN, HLD, chronic RBBB, obesity, thyroid CA s/p thyroidectomy, hypocalcemia due to hypoparathyroidisms  nonischemic CM with an ejection fraction of 11% and  normal coronaries on left heart cardiac catheterization 2014 , s/p AICD admitted with chronic systolic HF exacerbation, s/p RHC revealing low CI, treated with dobutamine drip which is discontinued today.     1. Hypocalcemia and Hyperphosphatemia due to hypoparathyroidism after thyroid surgery  2. Mild hyponatremia likely due to diuresis  3. Hypoalbuminemia likely due to chronic disease; HF  4. Hypervolemia due to HF exacerbation on dobutamine drip which was discontinued; doing better  5. HTN: BP acceptable    RECOMMENDATIONS:  - continue calcium supplement; Oscal/D  - continue calcitriol  - hold phoslo as phos on the lower side  - continue bumex per cardiology/HF team  - continue valsartan and hydralazine  - BMP daily; mag and phos tomorrow  - food rich in calcium  - continue to monitor ins and outs, daily weight; limit fluid intake to less than 1.2 liters a day    Rest of management per primary team    Larisa Lay MD  [facility]  [phone]

## 2018-06-01 NOTE — PROGRESS NOTE ADULT - ASSESSMENT
62yFemale with severe NICM s/p MDT AICD, HTN, DM, history of thyroid CA, obesity admitted with acute on chronic systolic heart failure.      Problem/Plan - 1:  ·  Problem: Acute on chronic systolic congestive heart failure with severe Cardiomyopathy S/P AICD  .  Plan: Clinically much better. S/P RHC. Bumex restarted. Diuresing well.  HF team following. ARB and Hydralazine. .       Problem/Plan - 2:  ·  Problem: Cardiogenic Shock .  Plan: S/P RHC.  Dobutamine stopped.  Doing well.     Problem/Plan - 3:  ·  Problem: Essential hypertension.  Plan: BP readings fine. Low salt diet.  Continue BP meds.      Problem/Plan - 4:  ·  Problem: Diabetes mellitus, type 2.  Plan: Sugars in acceptable range .   FS QID  HISS   DM diet.      Problem/Plan - 5:  ·  Problem: Hypocalcemia .  Plan :Replacing. Endo helping. . Will need IV replacement also.      Problem/Plan -6:  ·  Problem: Abnormal LFT  .  Plan : Trending down. Secondary to Hepatic congestion from CHF . GI helping.      Problem/Plan -7:  ·  Problem: MAGNUS  .  Plan : Resolved. Renal helping.       Problem/Plan -8:  Problem: Need for prophylactic measure. Plan: VTE with Heparin 5000U sub cut BID.  Fall, Aspirations and safety precautions,

## 2018-06-01 NOTE — PROGRESS NOTE ADULT - SUBJECTIVE AND OBJECTIVE BOX
EP     tele: NSR, Biv-pacing     no palpitations, no syncope, no angina, less dyspnea, no ICD shocks        MEDICATIONS  (STANDING):  aspirin enteric coated 81 milliGRAM(s) Oral daily  buMETAnide 1 milliGRAM(s) Oral <User Schedule>  calcitriol   Capsule 0.5 MICROGram(s) Oral at bedtime  calcium carbonate 1250 mG + Vitamin D (OsCal 500 + D) 1 Tablet(s) Oral three times a day  chlorhexidine 4% Liquid 1 Application(s) Topical <User Schedule>  cholecalciferol 1000 Unit(s) Oral daily  dextrose 5%. 1000 milliLiter(s) (50 mL/Hr) IV Continuous <Continuous>  dextrose 50% Injectable 12.5 Gram(s) IV Push once  dextrose 50% Injectable 25 Gram(s) IV Push once  dextrose 50% Injectable 25 Gram(s) IV Push once  famotidine    Tablet 20 milliGRAM(s) Oral two times a day  gabapentin 100 milliGRAM(s) Oral every 8 hours  heparin  Injectable 5000 Unit(s) SubCutaneous every 12 hours  hydrALAZINE 50 milliGRAM(s) Oral three times a day  insulin lispro (HumaLOG) corrective regimen sliding scale   SubCutaneous three times a day before meals  insulin lispro (HumaLOG) corrective regimen sliding scale   SubCutaneous at bedtime  levothyroxine 175 MICROGram(s) Oral daily  magnesium oxide 400 milliGRAM(s) Oral two times a day with meals  metoprolol succinate ER 25 milliGRAM(s) Oral daily  sodium chloride 0.9% lock flush 3 milliLiter(s) IV Push every 8 hours  valsartan 80 milliGRAM(s) Oral every 12 hours    MEDICATIONS  (PRN):  acetaminophen   Tablet. 650 milliGRAM(s) Oral every 6 hours PRN Moderate Pain (4 - 6)  dextrose 40% Gel 15 Gram(s) Oral once PRN Blood Glucose LESS THAN 70 milliGRAM(s)/deciliter  glucagon  Injectable 1 milliGRAM(s) IntraMuscular once PRN Glucose LESS THAN 70 milligrams/deciliter      LABS:                        11.9   6.30  )-----------( 230      ( 01 Jun 2018 05:30 )             36.6     Hemoglobin: 11.9 g/dL (06-01 @ 05:30)  Hemoglobin: 12.0 g/dL (05-31 @ 04:30)  Hemoglobin: 11.5 g/dL (05-30 @ 02:30)  Hemoglobin: 11.2 g/dL (05-29 @ 04:45)  Hemoglobin: 10.8 g/dL (05-28 @ 06:01)    06-01    134<L>  |  96<L>  |  17  ----------------------------<  119<H>  4.3   |  25  |  0.91    Ca    6.8<L>      01 Jun 2018 05:30  Phos  2.5     06-01  Mg     2.0     06-01    TPro  6.5  /  Alb  2.9<L>  /  TBili  2.7<H>  /  DBili  x   /  AST  69<H>  /  ALT  52<H>  /  AlkPhos  283<H>  06-01    Creatinine Trend: 0.91<--, 0.93<--, 0.93<--, 0.91<--, 0.89<--, 0.99<--           PHYSICAL EXAM  Vital Signs Last 24 Hrs  T(C): 36.9 (01 Jun 2018 08:00), Max: 36.9 (31 May 2018 16:00)  T(F): 98.4 (01 Jun 2018 08:00), Max: 98.4 (31 May 2018 16:00)  HR: 101 (01 Jun 2018 14:00) (91 - 113)  BP: 95/69 (01 Jun 2018 14:00) (84/56 - 115/92)  BP(mean): 75 (01 Jun 2018 14:00) (63 - 100)  RR: 20 (01 Jun 2018 14:00) (11 - 27)  SpO2: 100% (01 Jun 2018 14:00) (96% - 100%)      CVP 6  RRR, no murmurs  CTAB  soft nt/nd  no c/c/e      A/P) 61 y/o female PMH hypertension, hyperlipidemia, chronic RBBB, thyroid CA s/p thyroidectomy, nonischemic cardiomyopathy with an ejection fraction of 11% and normal coronaries on left heart cardiac catheterization 2014, s/p Medtronic Claria MRI CRT-ICD placement in June 2017 ([place]), admitted with decompensated CHF. EP call for BiV-optimization and NSVT on tele. She denies ICD shocks    -Last interrogation noted 99% BIV pacing with normal function, based on EKG her BIVICD is optimized  -medical management as per cardiology and CHF  -no need for AAD therapy given no ICD shocks because her VT is non-sustained  -no further EP work up needed as long as VT remains non-sustained not requiring ICD shocks  -supportive care as per CCU  - f/u with Dr Robbins 6/4 at 130pm for cardiology       Johanne Boyce RPA-C

## 2018-06-01 NOTE — PROGRESS NOTE ADULT - PROBLEM SELECTOR PLAN 3
Continues to diurese well with 2.1L output over 24 hrs. Wt. down to 94.8 kg from 107.3 on 5/26. off dobutamine. c/w hydralazine 50 mg tid. Repleting K and Mg as needed. Per HF recs patient will need eventual evaluation for LVAD/transplant.  - Ensure follow up with HF less than 3 days after discharge.

## 2018-06-01 NOTE — PROGRESS NOTE ADULT - SUBJECTIVE AND OBJECTIVE BOX
INTERVAL HPI/OVERNIGHT EVENTS: no new concerns. I feel better .   Vital Signs Last 24 Hrs  T(C): 36.9 (01 Jun 2018 08:00), Max: 36.9 (31 May 2018 16:00)  T(F): 98.4 (01 Jun 2018 08:00), Max: 98.4 (31 May 2018 16:00)  HR: 95 (01 Jun 2018 11:00) (92 - 113)  BP: 94/68 (01 Jun 2018 11:00) (84/56 - 115/92)  BP(mean): 74 (01 Jun 2018 11:00) (63 - 100)  RR: 26 (01 Jun 2018 11:00) (11 - 27)  SpO2: 100% (01 Jun 2018 11:00) (96% - 100%)  I&O's Summary    31 May 2018 07:01  -  01 Jun 2018 07:00  --------------------------------------------------------  IN: 720 mL / OUT: 2870 mL / NET: -2150 mL    01 Jun 2018 07:01  -  01 Jun 2018 13:48  --------------------------------------------------------  IN: 440 mL / OUT: 525 mL / NET: -85 mL      MEDICATIONS  (STANDING):  aspirin enteric coated 81 milliGRAM(s) Oral daily  buMETAnide 1 milliGRAM(s) Oral <User Schedule>  calcitriol   Capsule 0.5 MICROGram(s) Oral at bedtime  calcium carbonate 1250 mG + Vitamin D (OsCal 500 + D) 1 Tablet(s) Oral three times a day  carvedilol 3.125 milliGRAM(s) Oral every 12 hours  chlorhexidine 4% Liquid 1 Application(s) Topical <User Schedule>  cholecalciferol 1000 Unit(s) Oral daily  dextrose 5%. 1000 milliLiter(s) (50 mL/Hr) IV Continuous <Continuous>  dextrose 50% Injectable 12.5 Gram(s) IV Push once  dextrose 50% Injectable 25 Gram(s) IV Push once  dextrose 50% Injectable 25 Gram(s) IV Push once  famotidine    Tablet 20 milliGRAM(s) Oral two times a day  gabapentin 100 milliGRAM(s) Oral every 8 hours  heparin  Injectable 5000 Unit(s) SubCutaneous every 12 hours  hydrALAZINE 50 milliGRAM(s) Oral three times a day  insulin lispro (HumaLOG) corrective regimen sliding scale   SubCutaneous three times a day before meals  insulin lispro (HumaLOG) corrective regimen sliding scale   SubCutaneous at bedtime  levothyroxine 175 MICROGram(s) Oral daily  magnesium oxide 400 milliGRAM(s) Oral two times a day with meals  sodium chloride 0.9% lock flush 3 milliLiter(s) IV Push every 8 hours  valsartan 80 milliGRAM(s) Oral every 12 hours    MEDICATIONS  (PRN):  acetaminophen   Tablet. 650 milliGRAM(s) Oral every 6 hours PRN Moderate Pain (4 - 6)  dextrose 40% Gel 15 Gram(s) Oral once PRN Blood Glucose LESS THAN 70 milliGRAM(s)/deciliter  glucagon  Injectable 1 milliGRAM(s) IntraMuscular once PRN Glucose LESS THAN 70 milligrams/deciliter    LABS:                        11.9   6.30  )-----------( 230      ( 01 Jun 2018 05:30 )             36.6     06-01    134<L>  |  96<L>  |  17  ----------------------------<  119<H>  4.3   |  25  |  0.91    Ca    6.8<L>      01 Jun 2018 05:30  Phos  2.5     06-01  Mg     2.0     06-01    TPro  6.5  /  Alb  2.9<L>  /  TBili  2.7<H>  /  DBili  x   /  AST  69<H>  /  ALT  52<H>  /  AlkPhos  283<H>  06-01        CAPILLARY BLOOD GLUCOSE      POCT Blood Glucose.: 155 mg/dL (01 Jun 2018 12:35)  POCT Blood Glucose.: 118 mg/dL (01 Jun 2018 08:11)  POCT Blood Glucose.: 281 mg/dL (31 May 2018 22:48)  POCT Blood Glucose.: 120 mg/dL (31 May 2018 18:01)          REVIEW OF SYSTEMS:  CONSTITUTIONAL: No fever, weight loss, or fatigue  EYES: No eye pain, visual disturbances, or discharge  ENMT:  No difficulty hearing, tinnitus, vertigo; No sinus or throat pain  NECK: No pain or stiffness  BREASTS: No pain, masses, or nipple discharge  RESPIRATORY: No cough, wheezing, chills or hemoptysis; No shortness of breath  CARDIOVASCULAR: No chest pain, palpitations, dizziness, or leg swelling  GASTROINTESTINAL: No abdominal or epigastric pain. No nausea, vomiting, or hematemesis; No diarrhea or constipation. No melena or hematochezia.  GENITOURINARY: No dysuria, frequency, hematuria, or incontinence  NEUROLOGICAL: No headaches, memory loss, loss of strength, numbness, or tremors  SKIN: No itching, burning, rashes, or lesions   LYMPH NODES: No enlarged glands  ENDOCRINE: No heat or cold intolerance; No hair loss  MUSCULOSKELETAL: No joint pain or swelling; No muscle, back, or extremity pain  PSYCHIATRIC: No depression, anxiety, mood swings, or difficulty sleeping  HEME/LYMPH: No easy bruising, or bleeding gums  ALLERY AND IMMUNOLOGIC: No hives or eczema    RADIOLOGY & ADDITIONAL TESTS:    Consultant(s) Notes Reviewed:  [x ] YES  [ ] NO    PHYSICAL EXAM:  GENERAL: NAD, well-groomed, well-developed, not in any distress ,  HEAD:  Atraumatic, Normocephalic  EYES: EOMI, PERRLA, conjunctiva and sclera clear  ENMT: No tonsillar erythema, exudates, or enlargement; Moist mucous membranes, Good dentition, No lesions  NECK: Supple, No JVD, Normal thyroid  NERVOUS SYSTEM:  Alert & Oriented X3, No focal deficit   CHEST/LUNG: Good air entry bilateral with no  rales, rhonchi, wheezing, or rubs  HEART: Regular rate and rhythm; No murmurs, rubs, or gallops  ABDOMEN: Soft, Nontender, Nondistended; Bowel sounds present  EXTREMITIES:  2+ Peripheral Pulses, No clubbing, cyanosis, trace  edema  SKIN: No rashes or lesions    Care Discussed with Consultants/Other Providers [ x] YES  [ ] NO

## 2018-06-01 NOTE — PROGRESS NOTE ADULT - NSHPATTENDINGPLANDISCUSS_GEN_ALL_CORE
Dr Mckeon
pt and her 
pt and her daughter and 
CCU team
Dr. Juan Luis Land (Jake) and Hailee Mena NP
Juan Luis Land MD (Jake) and Hailee Mena NP
Vin Damico MD, Johnny Yu, NP

## 2018-06-01 NOTE — PROGRESS NOTE ADULT - PROBLEM SELECTOR PLAN 2
- LFTs stable  - secondary to Hepatic congestion from CHF   - previous admissions with similar laboratory findings with negative MRCP/CT scans for biliary obstruction  - clinically stable; if becomes symptomatic will further assess with US Abd

## 2018-06-04 ENCOUNTER — APPOINTMENT (OUTPATIENT)
Dept: CARDIOLOGY | Facility: CLINIC | Age: 63
End: 2018-06-04
Payer: COMMERCIAL

## 2018-06-04 ENCOUNTER — LABORATORY RESULT (OUTPATIENT)
Age: 63
End: 2018-06-04

## 2018-06-04 ENCOUNTER — NON-APPOINTMENT (OUTPATIENT)
Age: 63
End: 2018-06-04

## 2018-06-04 VITALS
SYSTOLIC BLOOD PRESSURE: 94 MMHG | HEIGHT: 67 IN | HEART RATE: 90 BPM | WEIGHT: 223 LBS | OXYGEN SATURATION: 100 % | RESPIRATION RATE: 16 BRPM | DIASTOLIC BLOOD PRESSURE: 69 MMHG | BODY MASS INDEX: 35 KG/M2

## 2018-06-04 DIAGNOSIS — Z86.79 PERSONAL HISTORY OF OTHER DISEASES OF THE CIRCULATORY SYSTEM: ICD-10-CM

## 2018-06-04 PROCEDURE — 36415 COLL VENOUS BLD VENIPUNCTURE: CPT

## 2018-06-04 PROCEDURE — 93000 ELECTROCARDIOGRAM COMPLETE: CPT

## 2018-06-04 PROCEDURE — 99214 OFFICE O/P EST MOD 30 MIN: CPT

## 2018-06-06 ENCOUNTER — MEDICATION RENEWAL (OUTPATIENT)
Age: 63
End: 2018-06-06

## 2018-06-06 LAB
ALBUMIN SERPL ELPH-MCNC: 3.8 G/DL
ALP BLD-CCNC: 386 U/L
ALT SERPL-CCNC: 79 U/L
ANION GAP SERPL CALC-SCNC: 18 MMOL/L
AST SERPL-CCNC: 89 U/L
BASOPHILS # BLD AUTO: 0.07 K/UL
BASOPHILS NFR BLD AUTO: 1.2 %
BILIRUB SERPL-MCNC: 4.2 MG/DL
BUN SERPL-MCNC: 28 MG/DL
CALCIUM SERPL-MCNC: 7.4 MG/DL
CHLORIDE SERPL-SCNC: 94 MMOL/L
CO2 SERPL-SCNC: 22 MMOL/L
CREAT SERPL-MCNC: 1.3 MG/DL
EOSINOPHIL # BLD AUTO: 0.05 K/UL
EOSINOPHIL NFR BLD AUTO: 0.8 %
GLUCOSE SERPL-MCNC: 118 MG/DL
HCT VFR BLD CALC: 34.4 %
HGB BLD-MCNC: 11.4 G/DL
IMM GRANULOCYTES NFR BLD AUTO: 0.8 %
INR PPP: 1.71 RATIO
LYMPHOCYTES # BLD AUTO: 1.64 K/UL
LYMPHOCYTES NFR BLD AUTO: 27.6 %
MAN DIFF?: NORMAL
MCHC RBC-ENTMCNC: 24.4 PG
MCHC RBC-ENTMCNC: 33.1 GM/DL
MCV RBC AUTO: 73.5 FL
MONOCYTES # BLD AUTO: 0.82 K/UL
MONOCYTES NFR BLD AUTO: 13.8 %
NEUTROPHILS # BLD AUTO: 3.31 K/UL
NEUTROPHILS NFR BLD AUTO: 55.8 %
NT-PROBNP SERPL-MCNC: 4264 PG/ML
PLATELET # BLD AUTO: 372 K/UL
POTASSIUM SERPL-SCNC: 5.1 MMOL/L
PROT SERPL-MCNC: 8 G/DL
PT BLD: 19.5 SEC
RBC # BLD: 4.68 M/UL
RBC # FLD: 24.9 %
SODIUM SERPL-SCNC: 134 MMOL/L
WBC # FLD AUTO: 5.94 K/UL

## 2018-06-08 ENCOUNTER — OTHER (OUTPATIENT)
Age: 63
End: 2018-06-08

## 2018-06-14 ENCOUNTER — NON-APPOINTMENT (OUTPATIENT)
Age: 63
End: 2018-06-14

## 2018-06-14 ENCOUNTER — APPOINTMENT (OUTPATIENT)
Dept: CARDIOLOGY | Facility: CLINIC | Age: 63
End: 2018-06-14
Payer: COMMERCIAL

## 2018-06-14 VITALS
HEIGHT: 67 IN | WEIGHT: 223 LBS | OXYGEN SATURATION: 100 % | SYSTOLIC BLOOD PRESSURE: 111 MMHG | RESPIRATION RATE: 16 BRPM | DIASTOLIC BLOOD PRESSURE: 78 MMHG | BODY MASS INDEX: 35 KG/M2 | HEART RATE: 92 BPM

## 2018-06-14 DIAGNOSIS — E03.9 HYPOTHYROIDISM, UNSPECIFIED: ICD-10-CM

## 2018-06-14 PROCEDURE — 99215 OFFICE O/P EST HI 40 MIN: CPT

## 2018-06-14 PROCEDURE — 93000 ELECTROCARDIOGRAM COMPLETE: CPT

## 2018-06-14 PROCEDURE — 36415 COLL VENOUS BLD VENIPUNCTURE: CPT

## 2018-06-14 RX ORDER — BUMETANIDE 1 MG/1
1 TABLET ORAL TWICE DAILY
Qty: 90 | Refills: 3 | Status: DISCONTINUED | COMMUNITY
Start: 2018-06-04 | End: 2018-06-14

## 2018-06-14 RX ORDER — PNV NO.95/FERROUS FUM/FOLIC AC 28MG-0.8MG
500-125 TABLET ORAL
Refills: 0 | Status: DISCONTINUED | COMMUNITY
Start: 2018-06-14 | End: 2018-06-14

## 2018-06-15 LAB
ANION GAP SERPL CALC-SCNC: 29 MMOL/L
BUN SERPL-MCNC: 28 MG/DL
CALCIUM SERPL-MCNC: 6.7 MG/DL
CHLORIDE SERPL-SCNC: 92 MMOL/L
CO2 SERPL-SCNC: 15 MMOL/L
CREAT SERPL-MCNC: 1.49 MG/DL
GLUCOSE SERPL-MCNC: 66 MG/DL
NT-PROBNP SERPL-MCNC: 1492 PG/ML
POTASSIUM SERPL-SCNC: 5.5 MMOL/L
SODIUM SERPL-SCNC: 136 MMOL/L

## 2018-06-18 ENCOUNTER — OTHER (OUTPATIENT)
Age: 63
End: 2018-06-18

## 2018-06-18 ENCOUNTER — APPOINTMENT (OUTPATIENT)
Dept: CARDIOLOGY | Facility: CLINIC | Age: 63
End: 2018-06-18
Payer: COMMERCIAL

## 2018-06-18 VITALS
HEART RATE: 86 BPM | SYSTOLIC BLOOD PRESSURE: 96 MMHG | BODY MASS INDEX: 32.96 KG/M2 | OXYGEN SATURATION: 97 % | RESPIRATION RATE: 16 BRPM | HEIGHT: 67 IN | WEIGHT: 210 LBS | DIASTOLIC BLOOD PRESSURE: 65 MMHG

## 2018-06-18 PROCEDURE — 36415 COLL VENOUS BLD VENIPUNCTURE: CPT

## 2018-06-18 PROCEDURE — 99214 OFFICE O/P EST MOD 30 MIN: CPT

## 2018-06-19 ENCOUNTER — MEDICATION RENEWAL (OUTPATIENT)
Age: 63
End: 2018-06-19

## 2018-06-19 ENCOUNTER — OTHER (OUTPATIENT)
Age: 63
End: 2018-06-19

## 2018-06-20 LAB
ALBUMIN SERPL ELPH-MCNC: 3.8 G/DL
ALP BLD-CCNC: 504 U/L
ALT SERPL-CCNC: 63 U/L
ANION GAP SERPL CALC-SCNC: 25 MMOL/L
AST SERPL-CCNC: 82 U/L
BILIRUB SERPL-MCNC: 3.7 MG/DL
BUN SERPL-MCNC: 25 MG/DL
CALCIUM SERPL-MCNC: 6.7 MG/DL
CHLORIDE SERPL-SCNC: 85 MMOL/L
CO2 SERPL-SCNC: 32 MMOL/L
CREAT SERPL-MCNC: 1.17 MG/DL
GLUCOSE SERPL-MCNC: 105 MG/DL
MAGNESIUM SERPL-MCNC: 1.7 MG/DL
NT-PROBNP SERPL-MCNC: 1734 PG/ML
POTASSIUM SERPL-SCNC: 2.7 MMOL/L
PROT SERPL-MCNC: 8 G/DL
SODIUM SERPL-SCNC: 142 MMOL/L

## 2018-06-25 ENCOUNTER — APPOINTMENT (OUTPATIENT)
Dept: CARDIOLOGY | Facility: CLINIC | Age: 63
End: 2018-06-25

## 2018-06-28 ENCOUNTER — INPATIENT (INPATIENT)
Facility: HOSPITAL | Age: 63
LOS: 3 days | Discharge: HOME | End: 2018-07-02
Attending: INTERNAL MEDICINE | Admitting: INTERNAL MEDICINE
Payer: COMMERCIAL

## 2018-06-28 VITALS
DIASTOLIC BLOOD PRESSURE: 67 MMHG | RESPIRATION RATE: 20 BRPM | HEART RATE: 88 BPM | SYSTOLIC BLOOD PRESSURE: 107 MMHG | TEMPERATURE: 98 F | OXYGEN SATURATION: 100 %

## 2018-06-28 DIAGNOSIS — E11.51 TYPE 2 DIABETES MELLITUS WITH DIABETIC PERIPHERAL ANGIOPATHY WITHOUT GANGRENE: ICD-10-CM

## 2018-06-28 DIAGNOSIS — E03.9 HYPOTHYROIDISM, UNSPECIFIED: ICD-10-CM

## 2018-06-28 DIAGNOSIS — I50.9 HEART FAILURE, UNSPECIFIED: ICD-10-CM

## 2018-06-28 DIAGNOSIS — I82.90 ACUTE EMBOLISM AND THROMBOSIS OF UNSPECIFIED VEIN: ICD-10-CM

## 2018-06-28 DIAGNOSIS — E83.52 HYPERCALCEMIA: ICD-10-CM

## 2018-06-28 DIAGNOSIS — I50.23 ACUTE ON CHRONIC SYSTOLIC (CONGESTIVE) HEART FAILURE: ICD-10-CM

## 2018-06-28 LAB
ALBUMIN SERPL ELPH-MCNC: 4 G/DL — SIGNIFICANT CHANGE UP (ref 3.3–5)
ALP SERPL-CCNC: 675 U/L — HIGH (ref 40–120)
ALT FLD-CCNC: 99 U/L — HIGH (ref 4–33)
AST SERPL-CCNC: 161 U/L — HIGH (ref 4–32)
BASE EXCESS BLDV CALC-SCNC: -2.7 MMOL/L — SIGNIFICANT CHANGE UP
BASOPHILS # BLD AUTO: 0.1 K/UL — SIGNIFICANT CHANGE UP (ref 0–0.2)
BASOPHILS NFR BLD AUTO: 1.3 % — SIGNIFICANT CHANGE UP (ref 0–2)
BILIRUB SERPL-MCNC: 6 MG/DL — HIGH (ref 0.2–1.2)
BLOOD GAS VENOUS - CREATININE: 1.43 MG/DL — HIGH (ref 0.5–1.3)
BUN SERPL-MCNC: 37 MG/DL — HIGH (ref 7–23)
CALCIUM SERPL-MCNC: 6.5 MG/DL — CRITICAL LOW (ref 8.4–10.5)
CHLORIDE BLDV-SCNC: 96 MMOL/L — SIGNIFICANT CHANGE UP (ref 96–108)
CHLORIDE SERPL-SCNC: 86 MMOL/L — LOW (ref 98–107)
CO2 SERPL-SCNC: 20 MMOL/L — LOW (ref 22–31)
CREAT SERPL-MCNC: 1.3 MG/DL — SIGNIFICANT CHANGE UP (ref 0.5–1.3)
EOSINOPHIL # BLD AUTO: 0.07 K/UL — SIGNIFICANT CHANGE UP (ref 0–0.5)
EOSINOPHIL NFR BLD AUTO: 0.9 % — SIGNIFICANT CHANGE UP (ref 0–6)
GAS PNL BLDV: 130 MMOL/L — LOW (ref 136–146)
GLUCOSE BLDV-MCNC: 134 — HIGH (ref 70–99)
GLUCOSE SERPL-MCNC: 145 MG/DL — HIGH (ref 70–99)
HCO3 BLDV-SCNC: 21 MMOL/L — SIGNIFICANT CHANGE UP (ref 20–27)
HCT VFR BLD CALC: 36.3 % — SIGNIFICANT CHANGE UP (ref 34.5–45)
HCT VFR BLDV CALC: 38.3 % — SIGNIFICANT CHANGE UP (ref 34.5–45)
HGB BLD-MCNC: 11.7 G/DL — SIGNIFICANT CHANGE UP (ref 11.5–15.5)
HGB BLDV-MCNC: 12.5 G/DL — SIGNIFICANT CHANGE UP (ref 11.5–15.5)
IMM GRANULOCYTES # BLD AUTO: 0.05 # — SIGNIFICANT CHANGE UP
IMM GRANULOCYTES NFR BLD AUTO: 0.7 % — SIGNIFICANT CHANGE UP (ref 0–1.5)
LACTATE BLDV-MCNC: 4 MMOL/L — CRITICAL HIGH (ref 0.5–2)
LYMPHOCYTES # BLD AUTO: 1.83 K/UL — SIGNIFICANT CHANGE UP (ref 1–3.3)
LYMPHOCYTES # BLD AUTO: 24.2 % — SIGNIFICANT CHANGE UP (ref 13–44)
MCHC RBC-ENTMCNC: 23.6 PG — LOW (ref 27–34)
MCHC RBC-ENTMCNC: 32.2 % — SIGNIFICANT CHANGE UP (ref 32–36)
MCV RBC AUTO: 73.3 FL — LOW (ref 80–100)
MONOCYTES # BLD AUTO: 1.06 K/UL — HIGH (ref 0–0.9)
MONOCYTES NFR BLD AUTO: 14 % — SIGNIFICANT CHANGE UP (ref 2–14)
NEUTROPHILS # BLD AUTO: 4.46 K/UL — SIGNIFICANT CHANGE UP (ref 1.8–7.4)
NEUTROPHILS NFR BLD AUTO: 58.9 % — SIGNIFICANT CHANGE UP (ref 43–77)
NRBC # FLD: 0.07 — SIGNIFICANT CHANGE UP
NT-PROBNP SERPL-SCNC: 2348 PG/ML — SIGNIFICANT CHANGE UP
PCO2 BLDV: 39 MMHG — LOW (ref 41–51)
PH BLDV: 7.36 PH — SIGNIFICANT CHANGE UP (ref 7.32–7.43)
PLATELET # BLD AUTO: 265 K/UL — SIGNIFICANT CHANGE UP (ref 150–400)
PMV BLD: SIGNIFICANT CHANGE UP FL (ref 7–13)
PO2 BLDV: < 24 MMHG — LOW (ref 35–40)
POTASSIUM BLDV-SCNC: 5.5 MMOL/L — HIGH (ref 3.4–4.5)
POTASSIUM SERPL-MCNC: 5.9 MMOL/L — HIGH (ref 3.5–5.3)
POTASSIUM SERPL-SCNC: 5.9 MMOL/L — HIGH (ref 3.5–5.3)
PROT SERPL-MCNC: 8.6 G/DL — HIGH (ref 6–8.3)
RBC # BLD: 4.95 M/UL — SIGNIFICANT CHANGE UP (ref 3.8–5.2)
RBC # FLD: 25.3 % — HIGH (ref 10.3–14.5)
SAO2 % BLDV: 24 % — LOW (ref 60–85)
SODIUM SERPL-SCNC: 131 MMOL/L — LOW (ref 135–145)
WBC # BLD: 7.57 K/UL — SIGNIFICANT CHANGE UP (ref 3.8–10.5)
WBC # FLD AUTO: 7.57 K/UL — SIGNIFICANT CHANGE UP (ref 3.8–10.5)

## 2018-06-28 PROCEDURE — 71045 X-RAY EXAM CHEST 1 VIEW: CPT | Mod: 26

## 2018-06-28 RX ORDER — MILRINONE LACTATE 1 MG/ML
0.25 INJECTION, SOLUTION INTRAVENOUS
Qty: 20 | Refills: 0 | Status: DISCONTINUED | OUTPATIENT
Start: 2018-06-28 | End: 2018-07-01

## 2018-06-28 RX ORDER — SODIUM CHLORIDE 9 MG/ML
1000 INJECTION, SOLUTION INTRAVENOUS
Qty: 0 | Refills: 0 | Status: DISCONTINUED | OUTPATIENT
Start: 2018-06-28 | End: 2018-07-02

## 2018-06-28 RX ORDER — INSULIN LISPRO 100/ML
VIAL (ML) SUBCUTANEOUS
Qty: 0 | Refills: 0 | Status: DISCONTINUED | OUTPATIENT
Start: 2018-06-28 | End: 2018-07-02

## 2018-06-28 RX ORDER — DEXTROSE 50 % IN WATER 50 %
25 SYRINGE (ML) INTRAVENOUS ONCE
Qty: 0 | Refills: 0 | Status: DISCONTINUED | OUTPATIENT
Start: 2018-06-28 | End: 2018-07-02

## 2018-06-28 RX ORDER — DEXTROSE 50 % IN WATER 50 %
15 SYRINGE (ML) INTRAVENOUS ONCE
Qty: 0 | Refills: 0 | Status: DISCONTINUED | OUTPATIENT
Start: 2018-06-28 | End: 2018-07-02

## 2018-06-28 RX ORDER — CHOLECALCIFEROL (VITAMIN D3) 125 MCG
1000 CAPSULE ORAL DAILY
Qty: 0 | Refills: 0 | Status: DISCONTINUED | OUTPATIENT
Start: 2018-06-28 | End: 2018-07-02

## 2018-06-28 RX ORDER — GLUCAGON INJECTION, SOLUTION 0.5 MG/.1ML
1 INJECTION, SOLUTION SUBCUTANEOUS ONCE
Qty: 0 | Refills: 0 | Status: DISCONTINUED | OUTPATIENT
Start: 2018-06-28 | End: 2018-07-02

## 2018-06-28 RX ORDER — DEXTROSE 50 % IN WATER 50 %
12.5 SYRINGE (ML) INTRAVENOUS ONCE
Qty: 0 | Refills: 0 | Status: DISCONTINUED | OUTPATIENT
Start: 2018-06-28 | End: 2018-07-02

## 2018-06-28 RX ORDER — CALCITRIOL 0.5 UG/1
0.5 CAPSULE ORAL DAILY
Qty: 0 | Refills: 0 | Status: DISCONTINUED | OUTPATIENT
Start: 2018-06-28 | End: 2018-06-29

## 2018-06-28 RX ORDER — FAMOTIDINE 10 MG/ML
20 INJECTION INTRAVENOUS
Qty: 0 | Refills: 0 | Status: DISCONTINUED | OUTPATIENT
Start: 2018-06-28 | End: 2018-07-02

## 2018-06-28 RX ORDER — GABAPENTIN 400 MG/1
100 CAPSULE ORAL EVERY 8 HOURS
Qty: 0 | Refills: 0 | Status: DISCONTINUED | OUTPATIENT
Start: 2018-06-28 | End: 2018-07-02

## 2018-06-28 RX ORDER — ASPIRIN/CALCIUM CARB/MAGNESIUM 324 MG
81 TABLET ORAL DAILY
Qty: 0 | Refills: 0 | Status: DISCONTINUED | OUTPATIENT
Start: 2018-06-28 | End: 2018-07-02

## 2018-06-28 RX ORDER — FUROSEMIDE 40 MG
20 TABLET ORAL
Qty: 500 | Refills: 0 | Status: DISCONTINUED | OUTPATIENT
Start: 2018-06-28 | End: 2018-06-29

## 2018-06-28 RX ORDER — LEVOTHYROXINE SODIUM 125 MCG
175 TABLET ORAL DAILY
Qty: 0 | Refills: 0 | Status: DISCONTINUED | OUTPATIENT
Start: 2018-06-28 | End: 2018-07-02

## 2018-06-28 RX ORDER — INSULIN LISPRO 100/ML
VIAL (ML) SUBCUTANEOUS AT BEDTIME
Qty: 0 | Refills: 0 | Status: DISCONTINUED | OUTPATIENT
Start: 2018-06-28 | End: 2018-07-02

## 2018-06-28 RX ORDER — MILRINONE LACTATE 1 MG/ML
0.25 INJECTION, SOLUTION INTRAVENOUS
Qty: 20 | Refills: 0 | Status: DISCONTINUED | OUTPATIENT
Start: 2018-06-28 | End: 2018-06-28

## 2018-06-28 RX ORDER — CALCIUM GLUCONATE 100 MG/ML
1 VIAL (ML) INTRAVENOUS ONCE
Qty: 0 | Refills: 0 | Status: COMPLETED | OUTPATIENT
Start: 2018-06-28 | End: 2018-06-28

## 2018-06-28 RX ORDER — FUROSEMIDE 40 MG
80 TABLET ORAL ONCE
Qty: 0 | Refills: 0 | Status: COMPLETED | OUTPATIENT
Start: 2018-06-28 | End: 2018-06-28

## 2018-06-28 RX ADMIN — MILRINONE LACTATE 7.5 MICROGRAM(S)/KG/MIN: 1 INJECTION, SOLUTION INTRAVENOUS at 21:11

## 2018-06-28 RX ADMIN — Medication 5 MG/HR: at 19:15

## 2018-06-28 RX ADMIN — MILRINONE LACTATE 16.5 MICROGRAM(S)/KG/MIN: 1 INJECTION, SOLUTION INTRAVENOUS at 19:43

## 2018-06-28 RX ADMIN — Medication 200 GRAM(S): at 19:06

## 2018-06-28 RX ADMIN — Medication 80 MILLIGRAM(S): at 19:06

## 2018-06-28 NOTE — H&P ADULT - NSHPPHYSICALEXAM_GEN_ALL_CORE
PHYSICAL EXAM:      Appearance: SOB	  HEENT:   Normal oral mucosa, PERRL, EOMI	  Lymphatic: No lymphadenopathy  Cardiovascular: Normal S1 S2, + JVD, No murmurs, No edema  Respiratory: Lungs clear to auscultation	  Psychiatry: A & O x 3, Mood & affect appropriate  Gastrointestinal:  Soft, Non-tender, + BS, distention   Skin: No rashes, No ecchymoses, No cyanosis	  Neurologic: Non-focal  Extremities: Normal range of motion, No clubbing, cyanosis ,2+pedal edema  Vascular: Peripheral pulses palpable 2+ bilaterally

## 2018-06-28 NOTE — CONSULT NOTE ADULT - SUBJECTIVE AND OBJECTIVE BOX
EP ATTENDING    History: She is a pleasant 61 y/o female PMH severe NICM (LVEF 10-15%) who had a Medtronic Biv-ICD implanted at [place]. A recent LHC at [place] was also unremarkable. She now returns for with a severe decompensation of her NICM (JVP > 20, hepatic congestion, abdominal pain, LE edema and a near 20 lb weight gain). Her device interrogation demonstrates excellent RA, RV and LV lead functions, but with frequent episodes of NSVT. She denies palpitations nor high voltage therapy.     PAST MEDICAL & SURGICAL HISTORY:  Asthma  NICM  DM (diabetes mellitus)  HTN (hypertension)  Thyroid ca S/P thyroidectomy    PShx: thyroidectomy, Medtronic BiV-ICD    MEDICATIONS  (STANDING): Entresto 24/26 bid, hydralazine 50 tid, synthroid 175 mcg daily, Mg, KCl, torsemide 60 bid, toprol xl 25mg daily, ASA 81 daily, tradjenta 5mg daily    Allergies: penicillin    FAMILY HISTORY:  No pertinent family history in first degree relatives    SOCIAL HISTORY:    (x ) Non-smoker ( ) Smoker ( ) Alcohol Abuse ( ) IVDA    REVIEW OF SYSTEMS:     CONSTITUTIONAL: No fever, chills, weight loss, or fatigue  RESPIRATORY: No cough, wheezing +SOB, +EDDY, +Orthopnea  CARDIOVASCULAR: No chest pain, palpitations, syncope +LE edema  GASTROINTESTINAL: No nausea, vomiting, diarrhea or constipation. No melena or hematochezia. +abdominal discomfort  GENITOURINARY: No dysuria, frequency, hematuria, or incontinence  NEUROLOGICAL: No headaches, memory loss, loss of strength, numbness, or tremors	    [x ] All others negative	  [ ] Unable to obtain    T(C): 36.7 (06-28-18 @ 17:31), Max: 36.7 (06-28-18 @ 17:31)  HR: 90 (06-28-18 @ 18:27) (88 - 90)  BP: 102/81 (06-28-18 @ 18:27) (102/81 - 107/67)  RR: 18 (06-28-18 @ 18:27) (18 - 20)  SpO2: 100% (06-28-18 @ 18:27) (100% - 100%)  Wt(kg): --    JVP 25  tachy, no murmurs  lungs with rales  soft nt/nd  + 2 edema                          11.7   7.57  )-----------( 265      ( 28 Jun 2018 17:55 )             36.3       06-28    131<L>  |  86<L>  |  37<H>  ----------------------------<  145<H>  5.9<H>   |  20<L>  |  1.30    Ca    6.5<LL>      28 Jun 2018 17:55    TPro  8.6<H>  /  Alb  4.0  /  TBili  6.0<H>  /  DBili  x   /  AST  161<H>  /  ALT  99<H>  /  AlkPhos  675<H>  06-28      EKG: NSR, Biv pacing    A/P) She is a pleasant 61 y/o female PMH severe NICM (LVEF 10-15%) who had a Medtronic Biv-ICD implanted at [place]. A recent LHC at [place] was also unremarkable. She now returns for with a severe decompensation of her NICM (JVP > 20, hepatic congestion, abdominal pain, LE edema and a near 20 lb weight gain). Her device interrogation demonstrates excellent RA, RV and LV lead functions, but with frequent episodes of NSVT. She denies palpitations nor high voltage therapy.     -admit to CCU under [place]  -start lasix drip  -start dobutamine drip  -CHF consult for advanced therapies (VAD vs transplant)  -no need for AAD  -no need to recheck ICD  -will follow  -may need right heart cath when closer to euvolemic - would consult interventional cardiology today for possible mechanical assist device  -she remains critically ill

## 2018-06-28 NOTE — ED PROVIDER NOTE - ATTENDING CONTRIBUTION TO CARE
agree with resident  62 y.o. PMH CHF, DM, HTN, Thyroid Ca, p/w fluid overload.   Recent admission to CCU EF 11% was on dobutamine and lasix IV drip.  Presents for increasing fluid retention and SOB.  States has gained over 20 lbs.  Saw her PMD/cardiologist today and was told to come to ED to get admitted for diuresis, dobutamine.      PE: uncomfortable, mildly dyspneic, normotensive; CTAB/L; s1 s2 no m/r/g abd soft but distended ext: pedal edema    clinical course  started on IV lasix drip and Iv milrinone drip; to be admitted to CCU

## 2018-06-28 NOTE — H&P ADULT - PROBLEM SELECTOR PLAN 2
Has h/o diabetes and is on oral hypoglycemic agents   Hold oral hypoglycemic agents  Monitor blood sugars  Lispro insulin sliding scale  Low carbohydrate diet  c/w gabapentin 100mg q 8hr     Nutrition consult

## 2018-06-28 NOTE — ED ADULT TRIAGE NOTE - CHIEF COMPLAINT QUOTE
Pt coming from doctors office sent in for fluid over load and to be admitted and have CHF CONSULT. Pt reports 4 days of sob and cough.

## 2018-06-28 NOTE — H&P ADULT - ASSESSMENT
62F with a past medical history of hypertension, hyperlipidemia, chronic RBBB,  obesity, thyroid CA, nonischemic cardiomyopathy with an ejection fraction of 11% and normal coronaries on recent cardiac catheterization at [facility] with Dr Chavarria , s/p Medtronic Sofi MRI CRT-ICD placement in June 2017, HFrEF 11% , multiple admission  for decompensated HF presents acute on chronic decompensated HF requiring inotrope support diuresis 62F with a past medical history of hypertension, hyperlipidemia, chronic RBBB,  obesity, thyroid CA s/p thyroidectomy ,chronic hypocalcemia  , nonischemic cardiomyopathy with an ejection fraction of 11% and normal coronaries on recent cardiac catheterization at [facility] with Dr Chavarria , s/p Medtronic Sofi MRI CRT-ICD placement in June 2017, HFrEF 11% , multiple admission  for decompensated HF presents  with acute on chronic decompensated HF requiring inotrope support diuresis

## 2018-06-28 NOTE — H&P ADULT - PROBLEM SELECTOR PLAN 1
received lasix 80mg IVP x1 in ED  started on lasix at 10mg /hr and milrinone drip at 0.25mcg    Daily weight monitoring, Strict I/O, Low sodium DASH diet  AICD interrogation showed NSVT, no shock   monitor BMP and replete electrolytes as needed  hold Metroprolol , valsartan and torsemide received lasix 80mg IVP x1 in ED  started on lasix at 10mg /hr and milrinone drip at 0.25mcg    Daily weight monitoring, Strict I/O, Low sodium DASH diet  AICD interrogation showed NSVT, no shock   monitor BMP and replete electrolytes as needed  hold Metroprolol , valsartan and torsemide  -CHF consult for advanced therapies (VAD vs transplant)

## 2018-06-28 NOTE — ED ADULT NURSE NOTE - OBJECTIVE STATEMENT
Received pt to spot 2 A&Ox4 c/o worsening EDDY and SOB associated with hoarse cough x 1 week. Pt reports seeing her PCP prescribed entresto without relief. Pt noted to be tachypneic on assessment, pt reports multiple admissions r/t CHF exacerbation. Denies CP, denies other medical complaints, appears comfortable. IV placed, labs sent, VS as documented, will continue to monitor.

## 2018-06-28 NOTE — ED PROVIDER NOTE - OBJECTIVE STATEMENT
62 y.o. PMH CHF, DM, HTN   p/w fluid overload.  She has been having diarrhea x 3 days, which started taking Entresto.  She noticed that she is gaining weight.  She is also feeling more short of breath than usual, and swelling in the legs.  She usually takes furosemide 80mg BID.  She takes it everyday twice a day, but has noticed reduced urination. 62 y.o. PMH CHF, DM, HTN, Thyroid Ca, p/w fluid overload.  She has been having diarrhea x 3 days, when she started taking Entresto.  She noticed that she is gaining weight.  She is also feeling more short of breath than usual, and swelling in the legs.  She usually takes torsemide 80mg BID.  She takes it regularly, but has noticed reduced urination.    Note in chart from primary Cardiologist [place] saying to start lasix gtt and admit to CCU under Dr. Grimes. 62 y.o. PMH CHF, DM, HTN, Thyroid Ca, p/w fluid overload.  She has been having diarrhea x 3 days, when she started taking Entresto.  She noticed that she is gaining weight.  She is also feeling more short of breath than usual, and swelling in the legs.  She usually takes torsemide 80mg BID.  She takes it regularly, but has noticed reduced urination.    Note in chart from primary Cardiologist [place] saying to start lasix gtt and admit to CCU under Dr. Grimes.  Recent CCU admission early June for heart failure.  LVEF% 11.

## 2018-06-28 NOTE — CONSULT NOTE ADULT - SUBJECTIVE AND OBJECTIVE BOX
Requesting Physician : Dr. Grimes    Reason for Consultation: CAD/acute on chronic systolic heart failure    HISTORY OF PRESENT ILLNESS: HPI:  62F with a past medical history of hypertension, hyperlipidemia, chronic RBBB,  obesity, thyroid CA, nonischemic cardiomyopathy with an ejection fraction of 11% and normal coronaries on recent cardiac catheterization at [facility] with Dr Chavarria , s/p Medtronic Sofi MRI CRT-ICD placement in June 2017,mutliple admission decompensated HF .She was recently admitted on 5/23/18 for cardiogenic shock and admitted to the CCU for inotropy assisted diuresis with dobutamine gtt and bumex. RHC and swanganz catheter placed for HD monitoring.CCU course complicated by acute on chronic hypocalcemia 2/2 aggressive diuresis. Endocrinology and Nephrology consulted (28 Jun 2018 22:24)  patient seen.  patient is complaining of dyspnea with minimal exertion.  No chest pain but worsening dyspnea past 2 weeks.      PAST MEDICAL & SURGICAL HISTORY:  Asthma  CHF (congestive heart failure): hypothyroid  DM (diabetes mellitus)  HTN (hypertension)  Thyroid ca  S/P thyroidectomy          MEDICATIONS:  MEDICATIONS  (STANDING):  furosemide Infusion 10 mG/Hr (5 mL/Hr) IV Continuous <Continuous>  milrinone Infusion 0.25 MICROgram(s)/kG/Min (7.5 mL/Hr) IV Continuous <Continuous>      Allergies    Isordil (Headache)  penicillin (Rash)    Intolerances        FAMILY HISTORY:  No pertinent family history in first degree relatives    Non-contributary for premature coronary disease or sudden cardiac death    SOCIAL HISTORY:    [ ] Non-smoker  [ ] Smoker  [ ] Alcohol      REVIEW OF SYSTEMS:  [n ]chest pain  [y  ]shortness of breath  [y  ]palpitations  [ n ]syncope  [n ]near syncope [ n]upper extremity weakness   [ n] lower extremity weakness  [n  ]diplopia  [n  ]altered mental status     [y ]lower extremity edema        [ ] All others negative	  [ ] Unable to obtain    PHYSICAL EXAM:  T(C): 36.7 (06-28-18 @ 20:21), Max: 36.7 (06-28-18 @ 17:31)  HR: 100 (06-28-18 @ 22:00) (80 - 100)  BP: 125/80 (06-28-18 @ 22:00) (102/81 - 125/80)  RR: 23 (06-28-18 @ 22:00) (17 - 24)  SpO2: 76% (06-28-18 @ 22:00) (76% - 100%)  Wt(kg): --  I&O's Summary    28 Jun 2018 07:01  -  28 Jun 2018 22:44  --------------------------------------------------------  IN: 0 mL / OUT: 450 mL / NET: -450 mL          HEENT:   Normal oral mucosa, PERRL, EOMI	  Lymphatic: No obvious lymphadenopathy , no edema  Cardiovascular: Normal S1 S2, No JVD,  1/6 ROSALINDA murmur , Peripheral pulses palpable 2+ bilaterally  Respiratory: Lungs clear to auscultation, normal effort 	  Gastrointestinal:  Soft, Non-tender, + BS	  Skin: No rashes, No cyanosis, warm to touch  Musculoskeletal: Normal range of motion, normal strength  Psychiatry:  Appropriate Mood & affect   EXT: no clubbing/ 2 to 3+ edema b/l    TELEMETRY: NSR	    ECG:  	      DIAGNOSTIC TESTING:  [ ] Echocardiogram:  [ ]  Catheterization:  [ ] Stress Test:    	  	  LABS:	 	    CARDIAC MARKERS:                              11.7   7.57  )-----------( 265      ( 28 Jun 2018 17:55 )             36.3     Hb Trend: 11.7<--    06-28    131<L>  |  86<L>  |  37<H>  ----------------------------<  145<H>  5.9<H>   |  20<L>  |  1.30    Ca    6.5<LL>      28 Jun 2018 17:55    TPro  8.6<H>  /  Alb  4.0  /  TBili  6.0<H>  /  DBili  x   /  AST  161<H>  /  ALT  99<H>  /  AlkPhos  675<H>  06-28    Creatinine Trend: 1.30<--, 0.91<--, 0.93<--, 0.93<--, 0.91<--        proBNP: Serum Pro-Brain Natriuretic Peptide: 2348 pg/mL (06-28 @ 17:55)      ASSESSMENT/PLAN: 	62yFemale with pmhx of NICM with known EF of 11 percent/asthma/obesity/ chronic systolic heart failure now with acute on chronic heart failure.  1) no need for cath. Cath at [facility] with luminal CAD  2) no need for ischemic evaluation.  Patient is non complaint with diet.  patient had increasd salt intake last week and now with progressive  Dyspnea  3) agree with CCU--will need IV lasix/dobutamine given acute on chronic systolic heart failure  4) continue Tele  5) EP eval--patient with recent ICD

## 2018-06-28 NOTE — H&P ADULT - NSHPREVIEWOFSYSTEMS_GEN_ALL_CORE
REVIEW OF SYSTEMS    General: + fatigue, + weight gain.  Skin: no rashes.  Ophthalmologic: no blurred vision, no loss of vision. 	  ENT: no sore throat, rhinorrhea, sinus congestion.  Cardiovascular: no chest pain ,no palpitation, no dizziness, no diaphoresis, + pedal  edema  Respiratory: + EDDY, no cough or wheeze.  Gastrointestinal:  + diarrhoea, no melena/hematemesis/hematochezia.+ abdomen bloating  Genitourinary: no dysuria/hesitancy or hematuria, decrease urine out put   Musculoskeletal: no myalgias or arthralgias.  Neurological: no changes in vision or hearing, no lightheadedness/dizziness, no syncope/near syncope	  Psychiatric: no unusual stress/anxiety

## 2018-06-28 NOTE — ED PROVIDER NOTE - PHYSICAL EXAMINATION
Gen:  NAD, awake, alert  HEENT: Gen:  NAD, awake, alert  HEENT:  Sclera clear, JVD  Heart:  RRR, no M/R/G  Lung:  CTA b/l, no rales or wheeze  Abd:  Obese, NT, ND  Ext:  1+ pitting edema on LE b/l up shins  Neuro:  Nonfocal  Skin:  No rash

## 2018-06-28 NOTE — H&P ADULT - HISTORY OF PRESENT ILLNESS
62F with a past medical history of hypertension, hyperlipidemia, chronic RBBB,  obesity, thyroid CA, nonischemic cardiomyopathy with an ejection fraction of 11% and normal coronaries on recent cardiac catheterization at [facility] with Dr Chavarria , s/p Medtronic Sofi MRI CRT-ICD placement in June 2017,mutliple admission decompensated HF .She was recently admitted on 5/23/18 for cardiogenic shock and admitted to the CCU for inotropy assisted diuresis with dobutamine gtt and bumex. RHC and swanganz catheter placed for HD monitoring.CCU course complicated by acute on chronic hypocalcemia 2/2 aggressive diuresis. Endocrinology and Nephrology consulted 62F with a past medical history of hypertension, hyperlipidemia, chronic RBBB,  obesity, thyroid CA, nonischemic cardiomyopathy with an ejection fraction of 11% and normal coronaries on recent cardiac catheterization at [facility] with Dr Chavarria , s/p Medtronic Sofi MRI CRT-ICD placement in June 2017, HFrEF 11% , multiple admission decompensated HF .She was recently admitted on 5/23/18 for cardiogenic shock and admitted to the CCU for inotrope assisted diuresis with dobutamine gtt and bumex. RHC and swan fili catheter placed for HD monitoring. CCU course complicated by acute on chronic hypocalcemia 2/2 aggressive diuresis. Endocrinology and Nephrology consulted and was discharge on PO Bumex on 6/1/18 .She now presented with worsening EDDY diarrhoea and abdomen bloating x 3days.As per patient she was doing  Ok until she was started on Entresto on last Friday 6/22/18 .She endorse abdomen discomfort after 1st dose of entresto on Friday  so she did not take entresto on Saturday and Sunday .She restarted on Monday and since then she had non bloody diarrhoea, decrease  urine output  and bloated abdomen which got worse today .She had loose watery stool x 5-6 times today .She also endorse worsening DEDY since Monday .As per patient she has chronic pedal edema which she think is better. She uses 2 pillows to sleep which is her baseline .She denies chest pain, palpitation ,diaphoresis, dizziness ,n/v ,fever or chills .  In ED Vital signs ; Blood Pressure 107/67,HR 88,RR 20,sat 100% on 2 L n/c ,temp 98F.She was found in acute on chronic decompensated HF and received lasix 80mg IVP and started on Lasix drip and milrinone for  aggressive diuresis    .She was transfer to CCU 62F with a past medical history of hypertension, hyperlipidemia, chronic RBBB,  obesity, thyroid CA, nonischemic cardiomyopathy with an ejection fraction of 11% and normal coronaries on recent cardiac catheterization at [facility] with Dr Chavarria , s/p Medtronic Sofi MRI CRT-ICD placement in June 2017, HFrEF 11% , multiple admission decompensated HF .She was recently admitted on 5/23/18 for cardiogenic shock and admitted to the CCU for inotrope assisted diuresis with dobutamine gtt and bumex. RHC and swan fili catheter placed for HD monitoring. CCU course complicated by acute on chronic hypocalcemia 2/2 aggressive diuresis. Endocrinology and Nephrology consulted and was discharge on PO Bumex on 6/1/18 .She now presented with worsening EDDY diarrhoea and abdomen bloating x 3days.As per patient she was doing  Ok until she was started on Entresto on last Friday 6/22/18 .She endorse abdomen discomfort after 1st dose of entresto on Friday  so she did not take entresto on Saturday and Sunday .She restarted on Monday and since then she had multiple  non bloody loose stool, decrease  urine output  and bloated abdomen which got worse today .She had loose watery diarrhoea  x 5-6 times today .She also endorse worsening EDDY since Monday .As per patient she has chronic pedal edema which she think is better. She uses 2 pillows to sleep which is her baseline . She also stop taking metoprolol, and  hydralazine  due to low Blood Pressure. She admit to nonadherence to dietary compliance . She denies chest pain, palpitation ,diaphoresis, dizziness ,n/v ,fever or chills .  In ED Vital signs ; Blood Pressure 107/67,HR 88,RR 20,sat 100% on 2 L n/c ,temp 98F.She was found in acute on chronic decompensated HF and received lasix 80mg IVP and started on Lasix drip and milrinone for  aggressive diuresis    .She was transfer to CCU 62F with a past medical history of hypertension, hyperlipidemia, chronic RBBB,  obesity, thyroid CA s/p thyroidectomy , nonischemic cardiomyopathy with an ejection fraction of 11% ,CHF and normal coronaries on recent cardiac catheterization at [facility] with Dr Chavarria , s/p Medtronic Sofi MRI CRT-ICD placement in June 2017, , multiple admission decompensated HF .She was recently admitted on 5/23/18 for cardiogenic shock and admitted to the CCU for inotrope assisted diuresis with dobutamine gtt and bumex. RHC and swan fili catheter placed for HD monitoring. CCU course complicated by acute on chronic hypocalcemia 2/2 aggressive diuresis. Endocrinology and Nephrology consulted and was discharge on PO Bumex on 6/1/18 .She now presented with worsening EDDY diarrhoea and abdomen bloating x 3days.As per patient she was doing  Ok until she was started on Entresto on last Friday 6/22/18 .She endorse abdomen discomfort after 1st dose of entresto on Friday  so she did not take entresto on Saturday and Sunday .She restarted on Monday and since then she had multiple  non bloody loose stool, decrease  urine output  and bloated abdomen which got worse today .She had loose watery diarrhoea  x 5-6 times today .She also endorse worsening EDDY since Monday .As per patient she has chronic pedal edema which she think is better. She uses 2 pillows to sleep which is her baseline . She also stop taking metoprolol, and  hydralazine  due to low Blood Pressure. She admit to nonadherence to dietary compliance . She denies chest pain, palpitation ,diaphoresis, dizziness ,n/v ,fever or chills .  In ED Vital signs ; Blood Pressure 107/67,HR 88,RR 20,sat 100% on 2 L n/c ,temp 98F.She was found in acute on chronic decompensated HF and received lasix 80mg IVP and started on Lasix drip and milrinone for  aggressive diuresis    .She was transfer to CCU

## 2018-06-28 NOTE — H&P ADULT - PROBLEM SELECTOR PLAN 4
thyroid ca s/p thyroidectomy with chronic hypocalcemia  - replete  calcium to goal of 7.5  c/w  home dose Oysco  and calcitriol

## 2018-06-28 NOTE — H&P ADULT - PROBLEM SELECTOR PROBLEM 2
Type 2 diabetes mellitus with diabetic peripheral angiopathy without gangrene, without long-term current use of insulin

## 2018-06-29 DIAGNOSIS — I82.90 ACUTE EMBOLISM AND THROMBOSIS OF UNSPECIFIED VEIN: ICD-10-CM

## 2018-06-29 DIAGNOSIS — I50.23 ACUTE ON CHRONIC SYSTOLIC (CONGESTIVE) HEART FAILURE: ICD-10-CM

## 2018-06-29 DIAGNOSIS — E83.51 HYPOCALCEMIA: ICD-10-CM

## 2018-06-29 LAB
ALBUMIN SERPL ELPH-MCNC: 3.4 G/DL — SIGNIFICANT CHANGE UP (ref 3.3–5)
ALBUMIN SERPL ELPH-MCNC: 3.8 G/DL — SIGNIFICANT CHANGE UP (ref 3.3–5)
ALP SERPL-CCNC: 565 U/L — HIGH (ref 40–120)
ALP SERPL-CCNC: 635 U/L — HIGH (ref 40–120)
ALT FLD-CCNC: 80 U/L — HIGH (ref 4–33)
ALT FLD-CCNC: 90 U/L — HIGH (ref 4–33)
AST SERPL-CCNC: 118 U/L — HIGH (ref 4–32)
AST SERPL-CCNC: 127 U/L — HIGH (ref 4–32)
B PERT DNA SPEC QL NAA+PROBE: SIGNIFICANT CHANGE UP
BASE EXCESS BLDV CALC-SCNC: 1.9 MMOL/L — SIGNIFICANT CHANGE UP
BILIRUB SERPL-MCNC: 5.2 MG/DL — HIGH (ref 0.2–1.2)
BILIRUB SERPL-MCNC: 5.3 MG/DL — HIGH (ref 0.2–1.2)
BUN SERPL-MCNC: 30 MG/DL — HIGH (ref 7–23)
BUN SERPL-MCNC: 35 MG/DL — HIGH (ref 7–23)
C PNEUM DNA SPEC QL NAA+PROBE: NOT DETECTED — SIGNIFICANT CHANGE UP
CA-I BLD-SCNC: 0.7 MMOL/L — CRITICAL LOW (ref 1.03–1.23)
CA-I BLD-SCNC: 0.78 MMOL/L — CRITICAL LOW (ref 1.03–1.23)
CALCIUM SERPL-MCNC: 5.9 MG/DL — CRITICAL LOW (ref 8.4–10.5)
CALCIUM SERPL-MCNC: 6.6 MG/DL — LOW (ref 8.4–10.5)
CHLORIDE SERPL-SCNC: 90 MMOL/L — LOW (ref 98–107)
CHLORIDE SERPL-SCNC: 91 MMOL/L — LOW (ref 98–107)
CO2 SERPL-SCNC: 22 MMOL/L — SIGNIFICANT CHANGE UP (ref 22–31)
CO2 SERPL-SCNC: 27 MMOL/L — SIGNIFICANT CHANGE UP (ref 22–31)
CREAT SERPL-MCNC: 1.15 MG/DL — SIGNIFICANT CHANGE UP (ref 0.5–1.3)
CREAT SERPL-MCNC: 1.19 MG/DL — SIGNIFICANT CHANGE UP (ref 0.5–1.3)
FERRITIN SERPL-MCNC: 63.51 NG/ML — SIGNIFICANT CHANGE UP (ref 15–150)
FLUAV H1 2009 PAND RNA SPEC QL NAA+PROBE: NOT DETECTED — SIGNIFICANT CHANGE UP
FLUAV H1 RNA SPEC QL NAA+PROBE: NOT DETECTED — SIGNIFICANT CHANGE UP
FLUAV H3 RNA SPEC QL NAA+PROBE: NOT DETECTED — SIGNIFICANT CHANGE UP
FLUAV SUBTYP SPEC NAA+PROBE: SIGNIFICANT CHANGE UP
FLUBV RNA SPEC QL NAA+PROBE: NOT DETECTED — SIGNIFICANT CHANGE UP
GAS PNL BLDV: 132 MMOL/L — LOW (ref 136–146)
GLUCOSE BLDC GLUCOMTR-MCNC: 119 MG/DL — HIGH (ref 70–99)
GLUCOSE BLDC GLUCOMTR-MCNC: 121 MG/DL — HIGH (ref 70–99)
GLUCOSE BLDC GLUCOMTR-MCNC: 191 MG/DL — HIGH (ref 70–99)
GLUCOSE BLDC GLUCOMTR-MCNC: 236 MG/DL — HIGH (ref 70–99)
GLUCOSE BLDV-MCNC: 135 — HIGH (ref 70–99)
GLUCOSE SERPL-MCNC: 128 MG/DL — HIGH (ref 70–99)
GLUCOSE SERPL-MCNC: 146 MG/DL — HIGH (ref 70–99)
HADV DNA SPEC QL NAA+PROBE: NOT DETECTED — SIGNIFICANT CHANGE UP
HAPTOGLOB SERPL-MCNC: 147 MG/DL — SIGNIFICANT CHANGE UP (ref 34–200)
HCO3 BLDV-SCNC: 26 MMOL/L — SIGNIFICANT CHANGE UP (ref 20–27)
HCOV 229E RNA SPEC QL NAA+PROBE: NOT DETECTED — SIGNIFICANT CHANGE UP
HCOV HKU1 RNA SPEC QL NAA+PROBE: NOT DETECTED — SIGNIFICANT CHANGE UP
HCOV NL63 RNA SPEC QL NAA+PROBE: NOT DETECTED — SIGNIFICANT CHANGE UP
HCOV OC43 RNA SPEC QL NAA+PROBE: NOT DETECTED — SIGNIFICANT CHANGE UP
HCT VFR BLD CALC: 32.3 % — LOW (ref 34.5–45)
HCT VFR BLDV CALC: 34.9 % — SIGNIFICANT CHANGE UP (ref 34.5–45)
HGB BLD-MCNC: 10.7 G/DL — LOW (ref 11.5–15.5)
HGB BLDV-MCNC: 11.3 G/DL — LOW (ref 11.5–15.5)
HMPV RNA SPEC QL NAA+PROBE: NOT DETECTED — SIGNIFICANT CHANGE UP
HPIV1 RNA SPEC QL NAA+PROBE: NOT DETECTED — SIGNIFICANT CHANGE UP
HPIV2 RNA SPEC QL NAA+PROBE: NOT DETECTED — SIGNIFICANT CHANGE UP
HPIV3 RNA SPEC QL NAA+PROBE: NOT DETECTED — SIGNIFICANT CHANGE UP
HPIV4 RNA SPEC QL NAA+PROBE: NOT DETECTED — SIGNIFICANT CHANGE UP
IRON SATN MFR SERPL: 392 UG/DL — SIGNIFICANT CHANGE UP (ref 140–530)
IRON SATN MFR SERPL: 40 UG/DL — SIGNIFICANT CHANGE UP (ref 30–160)
LACTATE SERPL-SCNC: 1.5 MMOL/L — SIGNIFICANT CHANGE UP (ref 0.5–2)
M PNEUMO DNA SPEC QL NAA+PROBE: NOT DETECTED — SIGNIFICANT CHANGE UP
MAGNESIUM SERPL-MCNC: 1.8 MG/DL — SIGNIFICANT CHANGE UP (ref 1.6–2.6)
MAGNESIUM SERPL-MCNC: 1.8 MG/DL — SIGNIFICANT CHANGE UP (ref 1.6–2.6)
MCHC RBC-ENTMCNC: 24.1 PG — LOW (ref 27–34)
MCHC RBC-ENTMCNC: 33.1 % — SIGNIFICANT CHANGE UP (ref 32–36)
MCV RBC AUTO: 72.7 FL — LOW (ref 80–100)
NRBC # FLD: 0.04 — SIGNIFICANT CHANGE UP
NT-PROBNP SERPL-SCNC: 1795 PG/ML — SIGNIFICANT CHANGE UP
PCO2 BLDV: 40 MMHG — LOW (ref 41–51)
PH BLDV: 7.43 PH — SIGNIFICANT CHANGE UP (ref 7.32–7.43)
PHOSPHATE SERPL-MCNC: 4.7 MG/DL — HIGH (ref 2.5–4.5)
PHOSPHATE SERPL-MCNC: 5.6 MG/DL — HIGH (ref 2.5–4.5)
PLATELET # BLD AUTO: 246 K/UL — SIGNIFICANT CHANGE UP (ref 150–400)
PMV BLD: 10.7 FL — SIGNIFICANT CHANGE UP (ref 7–13)
PO2 BLDV: 54 MMHG — HIGH (ref 35–40)
POTASSIUM BLDV-SCNC: 3.6 MMOL/L — SIGNIFICANT CHANGE UP (ref 3.4–4.5)
POTASSIUM SERPL-MCNC: 3.5 MMOL/L — SIGNIFICANT CHANGE UP (ref 3.5–5.3)
POTASSIUM SERPL-MCNC: 3.6 MMOL/L — SIGNIFICANT CHANGE UP (ref 3.5–5.3)
POTASSIUM SERPL-SCNC: 3.5 MMOL/L — SIGNIFICANT CHANGE UP (ref 3.5–5.3)
POTASSIUM SERPL-SCNC: 3.6 MMOL/L — SIGNIFICANT CHANGE UP (ref 3.5–5.3)
PROT SERPL-MCNC: 7.1 G/DL — SIGNIFICANT CHANGE UP (ref 6–8.3)
PROT SERPL-MCNC: 8.2 G/DL — SIGNIFICANT CHANGE UP (ref 6–8.3)
PTH-INTACT SERPL-MCNC: 17.75 PG/ML — SIGNIFICANT CHANGE UP (ref 15–65)
RBC # BLD: 4.44 M/UL — SIGNIFICANT CHANGE UP (ref 3.8–5.2)
RBC # FLD: 24.4 % — HIGH (ref 10.3–14.5)
RSV RNA SPEC QL NAA+PROBE: NOT DETECTED — SIGNIFICANT CHANGE UP
RV+EV RNA SPEC QL NAA+PROBE: NOT DETECTED — SIGNIFICANT CHANGE UP
SAO2 % BLDV: 86.1 % — HIGH (ref 60–85)
SODIUM SERPL-SCNC: 132 MMOL/L — LOW (ref 135–145)
SODIUM SERPL-SCNC: 137 MMOL/L — SIGNIFICANT CHANGE UP (ref 135–145)
UIBC SERPL-MCNC: 352.4 UG/DL — SIGNIFICANT CHANGE UP (ref 110–370)
WBC # BLD: 6.1 K/UL — SIGNIFICANT CHANGE UP (ref 3.8–10.5)
WBC # FLD AUTO: 6.1 K/UL — SIGNIFICANT CHANGE UP (ref 3.8–10.5)

## 2018-06-29 PROCEDURE — 99255 IP/OBS CONSLTJ NEW/EST HI 80: CPT

## 2018-06-29 PROCEDURE — 93306 TTE W/DOPPLER COMPLETE: CPT | Mod: 26

## 2018-06-29 RX ORDER — POTASSIUM CHLORIDE 20 MEQ
20 PACKET (EA) ORAL ONCE
Qty: 0 | Refills: 0 | Status: COMPLETED | OUTPATIENT
Start: 2018-06-29 | End: 2018-06-29

## 2018-06-29 RX ORDER — FUROSEMIDE 40 MG
80 TABLET ORAL ONCE
Qty: 0 | Refills: 0 | Status: COMPLETED | OUTPATIENT
Start: 2018-06-29 | End: 2018-06-29

## 2018-06-29 RX ORDER — CALCIUM ACETATE 667 MG
667 TABLET ORAL
Qty: 0 | Refills: 0 | Status: DISCONTINUED | OUTPATIENT
Start: 2018-06-29 | End: 2018-07-02

## 2018-06-29 RX ORDER — CALCITRIOL 0.5 UG/1
1 CAPSULE ORAL DAILY
Qty: 0 | Refills: 0 | Status: DISCONTINUED | OUTPATIENT
Start: 2018-06-29 | End: 2018-07-02

## 2018-06-29 RX ORDER — CALCIUM GLUCONATE 100 MG/ML
2 VIAL (ML) INTRAVENOUS ONCE
Qty: 0 | Refills: 0 | Status: COMPLETED | OUTPATIENT
Start: 2018-06-29 | End: 2018-06-29

## 2018-06-29 RX ORDER — MAGNESIUM OXIDE 400 MG ORAL TABLET 241.3 MG
400 TABLET ORAL ONCE
Qty: 0 | Refills: 0 | Status: COMPLETED | OUTPATIENT
Start: 2018-06-29 | End: 2018-06-29

## 2018-06-29 RX ORDER — FUROSEMIDE 40 MG
5 TABLET ORAL
Qty: 500 | Refills: 0 | Status: DISCONTINUED | OUTPATIENT
Start: 2018-06-29 | End: 2018-07-01

## 2018-06-29 RX ORDER — MAGNESIUM SULFATE 500 MG/ML
1 VIAL (ML) INJECTION ONCE
Qty: 0 | Refills: 0 | Status: COMPLETED | OUTPATIENT
Start: 2018-06-29 | End: 2018-06-29

## 2018-06-29 RX ORDER — CAPTOPRIL 12.5 MG/1
6.25 TABLET ORAL THREE TIMES A DAY
Qty: 0 | Refills: 0 | Status: DISCONTINUED | OUTPATIENT
Start: 2018-06-29 | End: 2018-07-02

## 2018-06-29 RX ORDER — CHLORHEXIDINE GLUCONATE 213 G/1000ML
1 SOLUTION TOPICAL
Qty: 0 | Refills: 0 | Status: DISCONTINUED | OUTPATIENT
Start: 2018-06-29 | End: 2018-07-02

## 2018-06-29 RX ORDER — POTASSIUM CHLORIDE 20 MEQ
40 PACKET (EA) ORAL ONCE
Qty: 0 | Refills: 0 | Status: COMPLETED | OUTPATIENT
Start: 2018-06-29 | End: 2018-06-29

## 2018-06-29 RX ORDER — HEPARIN SODIUM 5000 [USP'U]/ML
5000 INJECTION INTRAVENOUS; SUBCUTANEOUS EVERY 8 HOURS
Qty: 0 | Refills: 0 | Status: DISCONTINUED | OUTPATIENT
Start: 2018-06-29 | End: 2018-07-02

## 2018-06-29 RX ORDER — MAGNESIUM OXIDE 400 MG ORAL TABLET 241.3 MG
400 TABLET ORAL
Qty: 0 | Refills: 0 | Status: DISCONTINUED | OUTPATIENT
Start: 2018-06-29 | End: 2018-07-02

## 2018-06-29 RX ADMIN — GABAPENTIN 100 MILLIGRAM(S): 400 CAPSULE ORAL at 14:35

## 2018-06-29 RX ADMIN — CAPTOPRIL 6.25 MILLIGRAM(S): 12.5 TABLET ORAL at 14:41

## 2018-06-29 RX ADMIN — Medication 81 MILLIGRAM(S): at 12:03

## 2018-06-29 RX ADMIN — FAMOTIDINE 20 MILLIGRAM(S): 10 INJECTION INTRAVENOUS at 17:50

## 2018-06-29 RX ADMIN — Medication 1: at 11:58

## 2018-06-29 RX ADMIN — Medication 40 MILLIEQUIVALENT(S): at 05:50

## 2018-06-29 RX ADMIN — GABAPENTIN 100 MILLIGRAM(S): 400 CAPSULE ORAL at 22:30

## 2018-06-29 RX ADMIN — FAMOTIDINE 20 MILLIGRAM(S): 10 INJECTION INTRAVENOUS at 05:51

## 2018-06-29 RX ADMIN — HEPARIN SODIUM 5000 UNIT(S): 5000 INJECTION INTRAVENOUS; SUBCUTANEOUS at 05:51

## 2018-06-29 RX ADMIN — MILRINONE LACTATE 7.5 MICROGRAM(S)/KG/MIN: 1 INJECTION, SOLUTION INTRAVENOUS at 18:32

## 2018-06-29 RX ADMIN — Medication 200 GRAM(S): at 20:33

## 2018-06-29 RX ADMIN — CALCITRIOL 1 MICROGRAM(S): 0.5 CAPSULE ORAL at 13:04

## 2018-06-29 RX ADMIN — Medication 667 MILLIGRAM(S): at 13:04

## 2018-06-29 RX ADMIN — Medication 175 MICROGRAM(S): at 05:51

## 2018-06-29 RX ADMIN — Medication 667 MILLIGRAM(S): at 17:50

## 2018-06-29 RX ADMIN — Medication 1000 UNIT(S): at 12:04

## 2018-06-29 RX ADMIN — Medication 1 TABLET(S): at 22:31

## 2018-06-29 RX ADMIN — Medication 20 MILLIEQUIVALENT(S): at 20:33

## 2018-06-29 RX ADMIN — CAPTOPRIL 6.25 MILLIGRAM(S): 12.5 TABLET ORAL at 22:31

## 2018-06-29 RX ADMIN — GABAPENTIN 100 MILLIGRAM(S): 400 CAPSULE ORAL at 05:51

## 2018-06-29 RX ADMIN — MILRINONE LACTATE 7.5 MICROGRAM(S)/KG/MIN: 1 INJECTION, SOLUTION INTRAVENOUS at 09:36

## 2018-06-29 RX ADMIN — Medication 10 MG/HR: at 09:37

## 2018-06-29 RX ADMIN — Medication 200 GRAM(S): at 05:50

## 2018-06-29 RX ADMIN — HEPARIN SODIUM 5000 UNIT(S): 5000 INJECTION INTRAVENOUS; SUBCUTANEOUS at 22:30

## 2018-06-29 RX ADMIN — CHLORHEXIDINE GLUCONATE 1 APPLICATION(S): 213 SOLUTION TOPICAL at 09:37

## 2018-06-29 RX ADMIN — MAGNESIUM OXIDE 400 MG ORAL TABLET 400 MILLIGRAM(S): 241.3 TABLET ORAL at 05:50

## 2018-06-29 RX ADMIN — Medication 1 TABLET(S): at 05:51

## 2018-06-29 RX ADMIN — Medication 1 TABLET(S): at 14:35

## 2018-06-29 RX ADMIN — Medication 200 GRAM(S): at 09:37

## 2018-06-29 RX ADMIN — Medication 100 GRAM(S): at 20:33

## 2018-06-29 RX ADMIN — Medication 10 MG/HR: at 01:12

## 2018-06-29 RX ADMIN — Medication 80 MILLIGRAM(S): at 01:11

## 2018-06-29 NOTE — PROGRESS NOTE ADULT - SUBJECTIVE AND OBJECTIVE BOX
Subjective: patient seen/examined in ccu.  still dyspenic.  no chest pain.  	  MEDICATIONS:  MEDICATIONS  (STANDING):  aspirin enteric coated 81 milliGRAM(s) Oral daily  calcitriol   Capsule 1 MICROGram(s) Oral daily  calcium acetate 667 milliGRAM(s) Oral three times a day with meals  calcium carbonate 1250 mG + Vitamin D (OsCal 500 + D) 1 Tablet(s) Oral three times a day  captopril 6.25 milliGRAM(s) Oral three times a day  chlorhexidine 4% Liquid 1 Application(s) Topical <User Schedule>  cholecalciferol 1000 Unit(s) Oral daily  dextrose 5%. 1000 milliLiter(s) (50 mL/Hr) IV Continuous <Continuous>  dextrose 50% Injectable 12.5 Gram(s) IV Push once  dextrose 50% Injectable 25 Gram(s) IV Push once  dextrose 50% Injectable 25 Gram(s) IV Push once  famotidine    Tablet 20 milliGRAM(s) Oral two times a day  furosemide Infusion 10 mG/Hr (5 mL/Hr) IV Continuous <Continuous>  gabapentin 100 milliGRAM(s) Oral every 8 hours  heparin  Injectable 5000 Unit(s) SubCutaneous every 8 hours  insulin lispro (HumaLOG) corrective regimen sliding scale   SubCutaneous three times a day before meals  insulin lispro (HumaLOG) corrective regimen sliding scale   SubCutaneous at bedtime  levothyroxine 175 MICROGram(s) Oral daily  milrinone Infusion 0.25 MICROgram(s)/kG/Min (7.5 mL/Hr) IV Continuous <Continuous>      LABS:	 	    CARDIAC MARKERS:                       10.7   6.10  )-----------( 246      ( 29 Jun 2018 03:30 )             32.3     Hemoglobin: 10.7 g/dL (06-29 @ 03:30)  Hemoglobin: 11.7 g/dL (06-28 @ 17:55)      06-29    132<L>  |  91<L>  |  35<H>  ----------------------------<  128<H>  3.6   |  22  |  1.19    Ca    5.9<LL>      29 Jun 2018 03:30  Phos  5.6     06-29  Mg     1.8     06-29    TPro  7.1  /  Alb  3.4  /  TBili  5.3<H>  /  DBili  x   /  AST  118<H>  /  ALT  80<H>  /  AlkPhos  565<H>  06-29    Creatinine Trend: 1.19<--, 1.30<--, 0.91<--, 0.93<--, 0.93<--      proBNP: Serum Pro-Brain Natriuretic Peptide: 1795 pg/mL (06-29 @ 03:30)  Serum Pro-Brain Natriuretic Peptide: 2348 pg/mL (06-28 @ 17:55)          PHYSICAL EXAM:  T(C): 36.3 (06-29-18 @ 08:00), Max: 36.9 (06-29-18 @ 04:00)  HR: 96 (06-29-18 @ 10:00) (80 - 105)  BP: 110/80 (06-29-18 @ 09:00) (86/52 - 130/92)  RR: 22 (06-29-18 @ 10:00) (17 - 24)  SpO2: 96% (06-29-18 @ 09:00) (96% - 100%)  Wt(kg): --  I&O's Summary    28 Jun 2018 07:01 - 29 Jun 2018 07:00  --------------------------------------------------------  IN: 605 mL / OUT: 3150 mL / NET: -2545 mL    29 Jun 2018 07:01 - 29 Jun 2018 11:05  --------------------------------------------------------  IN: 135 mL / OUT: 1000 mL / NET: -865 mL      Height (cm): 170.18 (06-28 @ 22:00)  Weight (kg): 100.1 (06-28 @ 22:00)  BMI (kg/m2): 34.6 (06-28 @ 22:00)  BSA (m2): 2.11 (06-28 @ 22:00)    HEENT:   Normal oral mucosa, PERRL, EOMI	  Lymphatic: No obvious lymphadenopathy , no edema  Cardiovascular: Normal S1 S2, No JVD, 1/6 ROSALINDA murmur, Peripheral pulses palpable 2+ bilaterally  Respiratory: Lungs clear to auscultation, normal effort 	  Gastrointestinal:  Soft, Non-tender, + BS	  Skin: No rashes,  No cyanosis, warm to touch  Musculoskeletal: Normal range of motion, normal strength  Psychiatry:  Appropriate Mood & affect   ext: 3+ edema b/l    TELEMETRY: 	  NSR	      ASSESSMENT/PLAN: 	62y Female with pmhx of NICM/DM/ medically non compliant admitted with acute on chronic systolic heart failure.  1) acute CHF probably secondary to dietary non compliance.  no need for ischemic evaluation.  2) continue IV lasix/milrinone  3) cath at [place] with non obsructive CAD

## 2018-06-29 NOTE — PROGRESS NOTE ADULT - SUBJECTIVE AND OBJECTIVE BOX
EP ATTENDING    EKG: NSR, Biv pacing, no further VT    less dyspnea, no angina, no syncope    aspirin enteric coated 81 milliGRAM(s) Oral daily  calcitriol   Capsule 0.5 MICROGram(s) Oral daily  calcium carbonate 1250 mG + Vitamin D (OsCal 500 + D) 1 Tablet(s) Oral three times a day  chlorhexidine 4% Liquid 1 Application(s) Topical <User Schedule>  cholecalciferol 1000 Unit(s) Oral daily  dextrose 40% Gel 15 Gram(s) Oral once PRN  dextrose 5%. 1000 milliLiter(s) IV Continuous <Continuous>  dextrose 50% Injectable 12.5 Gram(s) IV Push once  dextrose 50% Injectable 25 Gram(s) IV Push once  dextrose 50% Injectable 25 Gram(s) IV Push once  famotidine    Tablet 20 milliGRAM(s) Oral two times a day  furosemide Infusion 20 mG/Hr IV Continuous <Continuous>  gabapentin 100 milliGRAM(s) Oral every 8 hours  glucagon  Injectable 1 milliGRAM(s) IntraMuscular once PRN  heparin  Injectable 5000 Unit(s) SubCutaneous every 8 hours  insulin lispro (HumaLOG) corrective regimen sliding scale   SubCutaneous three times a day before meals  insulin lispro (HumaLOG) corrective regimen sliding scale   SubCutaneous at bedtime  levothyroxine 175 MICROGram(s) Oral daily  milrinone Infusion 0.25 MICROgram(s)/kG/Min IV Continuous <Continuous>                            10.7   6.10  )-----------( 246      ( 29 Jun 2018 03:30 )             32.3       06-29    132<L>  |  91<L>  |  35<H>  ----------------------------<  128<H>  3.6   |  22  |  1.19    Ca    5.9<LL>      29 Jun 2018 03:30  Phos  5.6     06-29  Mg     1.8     06-29    TPro  7.1  /  Alb  3.4  /  TBili  5.3<H>  /  DBili  x   /  AST  118<H>  /  ALT  80<H>  /  AlkPhos  565<H>  06-29      T(C): 36.9 (06-29-18 @ 04:00), Max: 36.9 (06-29-18 @ 04:00)  HR: 96 (06-29-18 @ 07:00) (80 - 105)  BP: 130/92 (06-29-18 @ 06:00) (86/52 - 130/92)  RR: 24 (06-29-18 @ 07:00) (17 - 24)  SpO2: 96% (06-29-18 @ 07:00) (96% - 100%)  Wt(kg): --    JVP 15  RRR, no murmurs  CTAB  soft nt/nd  +2 edema    repeat echo pending      A/P) She is a pleasant 63 y/o female PMH severe NICM (LVEF 10-15%) who had a Medtronic Biv-ICD implanted at [place]. A recent LHC at [place] was also unremarkable. She now returns for with a severe decompensation of her NICM (JVP > 20, hepatic congestion, abdominal pain, LE edema and a near 20 lb weight gain). Her device interrogation demonstrates excellent RA, RV and LV lead functions, but with frequent episodes of NSVT. She denies palpitations nor high voltage therapy.     -continue lasix and milrinone drips   -CHF consult for advanced therapies (VAD vs transplant)   -no need for AAD at this time as no HV therapy  -no need to recheck ICD  -repeat echo pending  -she remains critically ill and will likely require long term mechanical support (LVAD vs transplant)

## 2018-06-29 NOTE — PROGRESS NOTE ADULT - SUBJECTIVE AND OBJECTIVE BOX
Tele:    Overnight:    MEDICATIONS  (STANDING):  aspirin enteric coated 81 milliGRAM(s) Oral daily  calcitriol   Capsule 1 MICROGram(s) Oral daily  calcium acetate 667 milliGRAM(s) Oral three times a day with meals  calcium carbonate 1250 mG + Vitamin D (OsCal 500 + D) 1 Tablet(s) Oral three times a day  captopril 6.25 milliGRAM(s) Oral three times a day  chlorhexidine 4% Liquid 1 Application(s) Topical <User Schedule>  cholecalciferol 1000 Unit(s) Oral daily  dextrose 5%. 1000 milliLiter(s) (50 mL/Hr) IV Continuous <Continuous>  dextrose 50% Injectable 12.5 Gram(s) IV Push once  dextrose 50% Injectable 25 Gram(s) IV Push once  dextrose 50% Injectable 25 Gram(s) IV Push once  famotidine    Tablet 20 milliGRAM(s) Oral two times a day  furosemide Infusion 20 mG/Hr (10 mL/Hr) IV Continuous <Continuous>  gabapentin 100 milliGRAM(s) Oral every 8 hours  heparin  Injectable 5000 Unit(s) SubCutaneous every 8 hours  insulin lispro (HumaLOG) corrective regimen sliding scale   SubCutaneous three times a day before meals  insulin lispro (HumaLOG) corrective regimen sliding scale   SubCutaneous at bedtime  levothyroxine 175 MICROGram(s) Oral daily  milrinone Infusion 0.25 MICROgram(s)/kG/Min (7.5 mL/Hr) IV Continuous <Continuous>    MEDICATIONS  (PRN):  dextrose 40% Gel 15 Gram(s) Oral once PRN Blood Glucose LESS THAN 70 milliGRAM(s)/deciliter  glucagon  Injectable 1 milliGRAM(s) IntraMuscular once PRN Glucose LESS THAN 70 milligrams/deciliter          Vital Signs Last 24 Hrs  T(C): 36.3 (29 Jun 2018 08:00), Max: 36.9 (29 Jun 2018 04:00)  T(F): 97.4 (29 Jun 2018 08:00), Max: 98.4 (29 Jun 2018 04:00)  HR: 92 (29 Jun 2018 09:00) (80 - 105)  BP: 110/80 (29 Jun 2018 09:00) (86/52 - 130/92)  BP(mean): 88 (29 Jun 2018 09:00) (60 - 101)  RR: 18 (29 Jun 2018 09:00) (17 - 24)  SpO2: 96% (29 Jun 2018 09:00) (96% - 100%)  I&O's Detail    28 Jun 2018 07:01  -  29 Jun 2018 07:00  --------------------------------------------------------  IN:    furosemide Infusion: 65 mL    furosemide Infusion: 15 mL    IV PiggyBack: 100 mL    milrinone  Infusion: 75 mL    Oral Fluid: 350 mL  Total IN: 605 mL    OUT:    Voided: 3150 mL  Total OUT: 3150 mL    Total NET: -2545 mL      29 Jun 2018 07:01  -  29 Jun 2018 09:41  --------------------------------------------------------  IN:    furosemide Infusion: 20 mL    milrinone  Infusion: 15 mL  Total IN: 35 mL    OUT:    Voided: 1000 mL  Total OUT: 1000 mL    Total NET: -965 mL            Physical exam:   Gen-  Resp-   CV-   ABD-  EXT-  Neuro-                            10.7   6.10  )-----------( 246      ( 29 Jun 2018 03:30 )             32.3       29 Jun 2018 03:30    132<L>  |  91<L>  |  35<H>  ----------------------------<  128<H>  3.6     |  22     |  1.19   28 Jun 2018 17:55    131<L>  |  86<L>  |  37<H>  ----------------------------<  145<H>  5.9<H>   |  20<L>  |  1.30     Ca    5.9<LL>      29 Jun 2018 03:30  Ca    6.5<LL>      28 Jun 2018 17:55  Phos  5.6<H>     29 Jun 2018 03:30  Mg     1.8       29 Jun 2018 03:30    TPro  7.1    /  Alb  3.4    /  TBili  5.3<H>  /  DBili  x      /  AST  118<H>  /  ALT  80<H>  /  AlkPhos  565<H>  29 Jun 2018 03:30  TPro  8.6<H>  /  Alb  4.0    /  TBili  6.0<H>  /  DBili  x      /  AST  161<H>  /  ALT  99<H>  /  AlkPhos  675<H>  28 Jun 2018 17:55                    Diagnostic testing:        Assessment and Plan: Tele:    Overnight: 62F with a past medical history of hypertension, hyperlipidemia, chronic RBBB,  obesity, thyroid CA s/p thyroidectomy , nonischemic cardiomyopathy with an ejection fraction of 11% ,CHF and normal coronaries on recent cardiac catheterization at [facility] with Dr Chavarria , s/p Med[employer] Sofi MRI CRT-ICD placement in June 2017, , multiple admission decompensated HF .She was recently admitted on 5/23/18 for cardiogenic shock and admitted to the CCU for inotrope assisted diuresis with dobutamine gtt and bumex. RHC and swan fili catheter placed for HD monitoring. CCU course complicated by acute on chronic hypocalcemia 2/2 aggressive diuresis. Endocrinology and Nephrology consulted and was discharge on PO Bumex on 6/1/18 .She now presented with worsening EDDY diarrhoea and abdomen bloating x 3days.As per patient she was doing  Ok until she was started on Entresto on last Friday 6/22/18 .She endorse abdomen discomfort after 1st dose of entresto on Friday  so she did not take entresto on Saturday and Sunday .She restarted on Monday and since then she had multiple  non bloody loose stool, decrease  urine output  and bloated abdomen which got worse today .She had loose watery diarrhoea  x 5-6 times today .She also endorse worsening EDDY since Monday .As per patient she has chronic pedal edema which she think is better. She uses 2 pillows to sleep which is her baseline . She also stop taking metoprolol, and  hydralazine  due to low Blood Pressure. She admit to nonadherence to dietary compliance . She denies chest pain, palpitation ,diaphoresis, dizziness ,n/v ,fever or chills .  In ED Vital signs ; Blood Pressure 107/67,HR 88,RR 20,sat 100% on 2 L n/c ,temp 98F.She was found in acute on chronic decompensated HF and received lasix 80mg IVP and started on Lasix drip and milrinone for  aggressive diuresis    .She was transfer to CCU     MEDICATIONS  (STANDING):  aspirin enteric coated 81 milliGRAM(s) Oral daily  calcitriol   Capsule 1 MICROGram(s) Oral daily  calcium acetate 667 milliGRAM(s) Oral three times a day with meals  calcium carbonate 1250 mG + Vitamin D (OsCal 500 + D) 1 Tablet(s) Oral three times a day  captopril 6.25 milliGRAM(s) Oral three times a day  chlorhexidine 4% Liquid 1 Application(s) Topical <User Schedule>  cholecalciferol 1000 Unit(s) Oral daily  dextrose 5%. 1000 milliLiter(s) (50 mL/Hr) IV Continuous <Continuous>  dextrose 50% Injectable 12.5 Gram(s) IV Push once  dextrose 50% Injectable 25 Gram(s) IV Push once  dextrose 50% Injectable 25 Gram(s) IV Push once  famotidine    Tablet 20 milliGRAM(s) Oral two times a day  furosemide Infusion 10 mG/Hr (5 mL/Hr) IV Continuous <Continuous>  gabapentin 100 milliGRAM(s) Oral every 8 hours  heparin  Injectable 5000 Unit(s) SubCutaneous every 8 hours  insulin lispro (HumaLOG) corrective regimen sliding scale   SubCutaneous three times a day before meals  insulin lispro (HumaLOG) corrective regimen sliding scale   SubCutaneous at bedtime  levothyroxine 175 MICROGram(s) Oral daily  milrinone Infusion 0.25 MICROgram(s)/kG/Min (7.5 mL/Hr) IV Continuous <Continuous>    MEDICATIONS  (PRN):  dextrose 40% Gel 15 Gram(s) Oral once PRN Blood Glucose LESS THAN 70 milliGRAM(s)/deciliter  glucagon  Injectable 1 milliGRAM(s) IntraMuscular once PRN Glucose LESS THAN 70 milligrams/deciliter          Vital Signs Last 24 Hrs  T(C): 36.3 (29 Jun 2018 08:00), Max: 36.9 (29 Jun 2018 04:00)  T(F): 97.4 (29 Jun 2018 08:00), Max: 98.4 (29 Jun 2018 04:00)  HR: 96 (29 Jun 2018 10:00) (80 - 105)  BP: 110/80 (29 Jun 2018 09:00) (86/52 - 130/92)  BP(mean): 88 (29 Jun 2018 09:00) (60 - 101)  RR: 22 (29 Jun 2018 10:00) (17 - 24)  SpO2: 96% (29 Jun 2018 09:00) (96% - 100%)  I&O's Detail    28 Jun 2018 07:01  -  29 Jun 2018 07:00  --------------------------------------------------------  IN:    furosemide Infusion: 65 mL    furosemide Infusion: 15 mL    IV PiggyBack: 100 mL    milrinone  Infusion: 75 mL    Oral Fluid: 350 mL  Total IN: 605 mL    OUT:    Voided: 3150 mL  Total OUT: 3150 mL    Total NET: -2545 mL      29 Jun 2018 07:01  -  29 Jun 2018 11:32  --------------------------------------------------------  IN:    furosemide Infusion: 20 mL    IV PiggyBack: 100 mL    milrinone  Infusion: 15 mL  Total IN: 135 mL    OUT:    Voided: 1000 mL  Total OUT: 1000 mL    Total NET: -865 mL            Physical exam:   Appearance: SOB	  HEENT:   Normal oral mucosa, PERRL, EOMI	  Lymphatic: No lymphadenopathy  Cardiovascular: Normal S1 S2, + JVD, No murmurs, No edema  Respiratory: Lungs clear to auscultation	  Psychiatry: A & O x 3, Mood & affect appropriate  Gastrointestinal:  Soft, Non-tender, + BS, distention   Skin: No rashes, No ecchymoses, No cyanosis	  Neurologic: Non-focal  Extremities: Normal range of motion, No clubbing, cyanosis ,2+pedal edema  Vascular: Peripheral pulses palpable 2+ bilaterally                            10.7   6.10  )-----------( 246      ( 29 Jun 2018 03:30 )             32.3       29 Jun 2018 03:30    132<L>  |  91<L>  |  35<H>  ----------------------------<  128<H>  3.6     |  22     |  1.19   28 Jun 2018 17:55    131<L>  |  86<L>  |  37<H>  ----------------------------<  145<H>  5.9<H>   |  20<L>  |  1.30     Ca    5.9<LL>      29 Jun 2018 03:30  Ca    6.5<LL>      28 Jun 2018 17:55  Phos  5.6<H>     29 Jun 2018 03:30  Mg     1.8       29 Jun 2018 03:30    TPro  7.1    /  Alb  3.4    /  TBili  5.3<H>  /  DBili  x      /  AST  118<H>  /  ALT  80<H>  /  AlkPhos  565<H>  29 Jun 2018 03:30  TPro  8.6<H>  /  Alb  4.0    /  TBili  6.0<H>  /  DBili  x      /  AST  161<H>  /  ALT  99<H>  /  AlkPhos  675<H>  28 Jun 2018 17:55        Diagnostic testing:  < from: Xray Chest 1 View AP/PA (06.28.18 @ 19:54) >    IMPRESSION:   Cardiomediastinal silhouette is prominent, and has a globular contour,   suggesting underlying pericardial effusion.  Echocardiogram or cross-sectional imaging can be performed for further   evaluation. Tele:    Overnight: 62F with a past medical history of hypertension, hyperlipidemia, chronic RBBB,  obesity, thyroid CA s/p thyroidectomy , nonischemic cardiomyopathy with an ejection fraction of 11% ,CHF and normal coronaries on recent cardiac catheterization at [facility] with Dr Chavarria, s/p [employer] Sofi MRI CRT-ICD placement in June 2017, multiple admission decompensated HF comes in for sxs of acute on chronic CHF (worsening EDDY, abdominal bloating, decreased UOP). She was recently admitted on 5/23/18 for cardiogenic shock and admitted to the CCU for inotrope assisted diuresis with dobutamine gtt and bumex. RHC and swan fili catheter placed for HD monitoring. CCU course complicated by acute on chronic hypocalcemia 2/2 aggressive diuresis. Endocrinology and Nephrology consulted and was discharge on PO Bumex on 6/1/18. She now presented with worsening EDDY diarrhea and abdomen bloating x 3days. As per patient she was doing  Ok until she was started on Entresto on last Friday 6/22/18. She endorse abdomen discomfort after 1st dose of entresto on Friday  so she did not take entresto on Saturday and Sunday .She restarted on Monday and since then she had multiple  non bloody loose stool, decrease  urine output  and bloated abdomen which got worse today .She had loose watery diarrhoea  x 5-6 times today .She also endorse worsening EDDY since Monday .As per patient she has chronic pedal edema which she think is better. She uses 2 pillows to sleep which is her baseline . She also stop taking metoprolol, and  hydralazine  due to low Blood Pressure. She admit to nonadherence to dietary compliance . She denies chest pain, palpitation ,diaphoresis, dizziness ,n/v ,fever or chills .  In ED Vital signs ; Blood Pressure 107/67,HR 88,RR 20,sat 100% on 2 L n/c ,temp 98F.She was found in acute on chronic decompensated HF and received lasix 80mg IVP and started on Lasix drip and milrinone for  aggressive diuresis. She was transfered to CCU for management.    NAOE. Currently, the patient reports no fevers/chills, nausea/vomiting, CP/SOB, palpitations, abdominal pain, dysuria. She also reports resolution of her sxs of cough and diarrhea.     MEDICATIONS  (STANDING):  aspirin enteric coated 81 milliGRAM(s) Oral daily  calcitriol   Capsule 1 MICROGram(s) Oral daily  calcium acetate 667 milliGRAM(s) Oral three times a day with meals  calcium carbonate 1250 mG + Vitamin D (OsCal 500 + D) 1 Tablet(s) Oral three times a day  captopril 6.25 milliGRAM(s) Oral three times a day  chlorhexidine 4% Liquid 1 Application(s) Topical <User Schedule>  cholecalciferol 1000 Unit(s) Oral daily  dextrose 5%. 1000 milliLiter(s) (50 mL/Hr) IV Continuous <Continuous>  dextrose 50% Injectable 12.5 Gram(s) IV Push once  dextrose 50% Injectable 25 Gram(s) IV Push once  dextrose 50% Injectable 25 Gram(s) IV Push once  famotidine    Tablet 20 milliGRAM(s) Oral two times a day  furosemide Infusion 10 mG/Hr (5 mL/Hr) IV Continuous <Continuous>  gabapentin 100 milliGRAM(s) Oral every 8 hours  heparin  Injectable 5000 Unit(s) SubCutaneous every 8 hours  insulin lispro (HumaLOG) corrective regimen sliding scale   SubCutaneous three times a day before meals  insulin lispro (HumaLOG) corrective regimen sliding scale   SubCutaneous at bedtime  levothyroxine 175 MICROGram(s) Oral daily  milrinone Infusion 0.25 MICROgram(s)/kG/Min (7.5 mL/Hr) IV Continuous <Continuous>    MEDICATIONS  (PRN):  dextrose 40% Gel 15 Gram(s) Oral once PRN Blood Glucose LESS THAN 70 milliGRAM(s)/deciliter  glucagon  Injectable 1 milliGRAM(s) IntraMuscular once PRN Glucose LESS THAN 70 milligrams/deciliter          Vital Signs Last 24 Hrs  T(C): 36.3 (29 Jun 2018 08:00), Max: 36.9 (29 Jun 2018 04:00)  T(F): 97.4 (29 Jun 2018 08:00), Max: 98.4 (29 Jun 2018 04:00)  HR: 96 (29 Jun 2018 10:00) (80 - 105)  BP: 110/80 (29 Jun 2018 09:00) (86/52 - 130/92)  BP(mean): 88 (29 Jun 2018 09:00) (60 - 101)  RR: 22 (29 Jun 2018 10:00) (17 - 24)  SpO2: 96% (29 Jun 2018 09:00) (96% - 100%)  I&O's Detail    28 Jun 2018 07:01  -  29 Jun 2018 07:00  --------------------------------------------------------  IN:    furosemide Infusion: 65 mL    furosemide Infusion: 15 mL    IV PiggyBack: 100 mL    milrinone  Infusion: 75 mL    Oral Fluid: 350 mL  Total IN: 605 mL    OUT:    Voided: 3150 mL  Total OUT: 3150 mL    Total NET: -2545 mL      29 Jun 2018 07:01  -  29 Jun 2018 11:32  --------------------------------------------------------  IN:    furosemide Infusion: 20 mL    IV PiggyBack: 100 mL    milrinone  Infusion: 15 mL  Total IN: 135 mL    OUT:    Voided: 1000 mL  Total OUT: 1000 mL    Total NET: -865 mL            Physical exam:   Appearance: No acute distress	  HEENT:   Normal oral mucosa, PERRL, EOMI	  Lymphatic: No lymphadenopathy  Cardiovascular: Normal S1 S2, No m/r/g  Respiratory: Lungs clear to auscultation and percussion bilaterally	  Psychiatry: A & O x 3, Mood & affect appropriate  Gastrointestinal:  Soft, Non-tender, No distension, Positive bowel sounds  Skin: No rashes, No ecchymoses, No cyanosis	  Neurologic: Non-focal  Extremities: No lower extremity edema, Normal range of motion, No clubbing/cyanosis  Vascular: Peripheral pulses palpable 2+ bilaterally                            10.7   6.10  )-----------( 246      ( 29 Jun 2018 03:30 )             32.3       29 Jun 2018 03:30    132<L>  |  91<L>  |  35<H>  ----------------------------<  128<H>  3.6     |  22     |  1.19   28 Jun 2018 17:55    131<L>  |  86<L>  |  37<H>  ----------------------------<  145<H>  5.9<H>   |  20<L>  |  1.30     Ca    5.9<LL>      29 Jun 2018 03:30  Ca    6.5<LL>      28 Jun 2018 17:55  Phos  5.6<H>     29 Jun 2018 03:30  Mg     1.8       29 Jun 2018 03:30    TPro  7.1    /  Alb  3.4    /  TBili  5.3<H>  /  DBili  x      /  AST  118<H>  /  ALT  80<H>  /  AlkPhos  565<H>  29 Jun 2018 03:30  TPro  8.6<H>  /  Alb  4.0    /  TBili  6.0<H>  /  DBili  x      /  AST  161<H>  /  ALT  99<H>  /  AlkPhos  675<H>  28 Jun 2018 17:55        Diagnostic testing:  < from: Xray Chest 1 View AP/PA (06.28.18 @ 19:54) >    IMPRESSION:   Cardiomediastinal silhouette is prominent, and has a globular contour,   suggesting underlying pericardial effusion.  Echocardiogram or cross-sectional imaging can be performed for further   evaluation.

## 2018-06-29 NOTE — PROGRESS NOTE ADULT - PROBLEM SELECTOR PLAN 4
thyroid ca s/p thyroidectomy with chronic hypocalcemia  - replete  calcium to goal of 7.5  c/w  home dose Oysco  and calcitriol - C/w levothyroxine - C/w Levothyroxine

## 2018-06-29 NOTE — CONSULT NOTE ADULT - SUBJECTIVE AND OBJECTIVE BOX
Date of Admission: 6/28/18    CHIEF COMPLAINT: SOB    HISTORY OF PRESENT ILLNESS:          Allergies    Isordil (Headache)  penicillin (Rash)    Intolerances    	    MEDICATIONS:  aspirin enteric coated 81 milliGRAM(s) Oral daily  captopril 6.25 milliGRAM(s) Oral three times a day  furosemide Infusion 10 mG/Hr IV Continuous <Continuous>  heparin  Injectable 5000 Unit(s) SubCutaneous every 8 hours  milrinone Infusion 0.25 MICROgram(s)/kG/Min IV Continuous <Continuous>        gabapentin 100 milliGRAM(s) Oral every 8 hours    famotidine    Tablet 20 milliGRAM(s) Oral two times a day    dextrose 40% Gel 15 Gram(s) Oral once PRN  dextrose 50% Injectable 12.5 Gram(s) IV Push once  dextrose 50% Injectable 25 Gram(s) IV Push once  dextrose 50% Injectable 25 Gram(s) IV Push once  glucagon  Injectable 1 milliGRAM(s) IntraMuscular once PRN  insulin lispro (HumaLOG) corrective regimen sliding scale   SubCutaneous three times a day before meals  insulin lispro (HumaLOG) corrective regimen sliding scale   SubCutaneous at bedtime  levothyroxine 175 MICROGram(s) Oral daily    calcitriol   Capsule 1 MICROGram(s) Oral daily  calcium acetate 667 milliGRAM(s) Oral three times a day with meals  calcium carbonate 1250 mG + Vitamin D (OsCal 500 + D) 1 Tablet(s) Oral three times a day  chlorhexidine 4% Liquid 1 Application(s) Topical <User Schedule>  cholecalciferol 1000 Unit(s) Oral daily  dextrose 5%. 1000 milliLiter(s) IV Continuous <Continuous>      PAST MEDICAL & SURGICAL HISTORY:  Asthma  CHF (congestive heart failure): hypothyroid  DM (diabetes mellitus)  HTN (hypertension)  Thyroid ca  S/P thyroidectomy      FAMILY HISTORY:  No pertinent family history in first degree relatives      SOCIAL HISTORY:    [ ] Non-smoker  [ ] Smoker  [ ] Alcohol      REVIEW OF SYSTEMS:  CONSTITUTIONAL: No fever, weight loss, or fatigue  EYES: No eye pain, visual disturbances, or discharge  ENMT:  No difficulty hearing, tinnitus, vertigo; No sinus or throat pain  NECK: No pain or stiffness  RESPIRATORY: No cough, wheezing, chills or hemoptysis; No Shortness of Breath  CARDIOVASCULAR: No chest pain, palpitations, passing out, dizziness, or leg swelling  GASTROINTESTINAL: No abdominal or epigastric pain. No nausea, vomiting, or hematemesis; No diarrhea or constipation. No melena or hematochezia.  GENITOURINARY: No dysuria, frequency, hematuria, or incontinence  NEUROLOGICAL: No headaches, memory loss, loss of strength, numbness, or tremors  SKIN: No itching, burning, rashes, or lesions   LYMPH Nodes: No enlarged glands  ENDOCRINE: No heat or cold intolerance; No hair loss  MUSCULOSKELETAL: No joint pain or swelling; No muscle, back, or extremity pain  PSYCHIATRIC: No depression, anxiety, mood swings, or difficulty sleeping  HEME/LYMPH: No easy bruising, or bleeding gums  ALLERY AND IMMUNOLOGIC: No hives or eczema	    [ ] All others negative	  [ ] Unable to obtain    PHYSICAL EXAM:  T(C): 36.3 (06-29-18 @ 08:00), Max: 36.9 (06-29-18 @ 04:00)  HR: 92 (06-29-18 @ 15:00) (80 - 105)  BP: 104/74 (06-29-18 @ 15:00) (86/52 - 130/92)  RR: 15 (06-29-18 @ 15:00) (14 - 24)  SpO2: 96% (06-29-18 @ 09:00) (96% - 100%)  Wt(kg): --  I&O's Summary    28 Jun 2018 07:01  -  29 Jun 2018 07:00  --------------------------------------------------------  IN: 605 mL / OUT: 3150 mL / NET: -2545 mL    29 Jun 2018 07:01  -  29 Jun 2018 15:36  --------------------------------------------------------  IN: 882.5 mL / OUT: 1450 mL / NET: -567.5 mL        Appearance: Normal	  HEENT:   Normal oral mucosa, PERRL, EOMI	  Lymphatic: No lymphadenopathy  Cardiovascular: Normal S1 S2, No JVD, No murmurs, No edema  Respiratory: Lungs clear to auscultation	  Psychiatry: A & O x 3, Mood & affect appropriate  Gastrointestinal:  Soft, Non-tender, + BS	  Skin: No rashes, No ecchymoses, No cyanosis	  Neurologic: Non-focal  Extremities: Normal range of motion, No clubbing, cyanosis or edema  Vascular: Peripheral pulses palpable 2+ bilaterally        LABS:	 	    CBC Full  -  ( 29 Jun 2018 03:30 )  WBC Count : 6.10 K/uL  Hemoglobin : 10.7 g/dL  Hematocrit : 32.3 %  Platelet Count - Automated : 246 K/uL  Mean Cell Volume : 72.7 fL  Mean Cell Hemoglobin : 24.1 pg  Mean Cell Hemoglobin Concentration : 33.1 %  Auto Neutrophil # : x  Auto Lymphocyte # : x  Auto Monocyte # : x  Auto Eosinophil # : x  Auto Basophil # : x  Auto Neutrophil % : x  Auto Lymphocyte % : x  Auto Monocyte % : x  Auto Eosinophil % : x  Auto Basophil % : x    06-29    132<L>  |  91<L>  |  35<H>  ----------------------------<  128<H>  3.6   |  22  |  1.19  06-28    131<L>  |  86<L>  |  37<H>  ----------------------------<  145<H>  5.9<H>   |  20<L>  |  1.30    Ca    5.9<LL>      29 Jun 2018 03:30  Ca    6.5<LL>      28 Jun 2018 17:55  Phos  5.6     06-29  Mg     1.8     06-29    TPro  7.1  /  Alb  3.4  /  TBili  5.3<H>  /  DBili  x   /  AST  118<H>  /  ALT  80<H>  /  AlkPhos  565<H>  06-29  TPro  8.6<H>  /  Alb  4.0  /  TBili  6.0<H>  /  DBili  x   /  AST  161<H>  /  ALT  99<H>  /  AlkPhos  675<H>  06-28      proBNP: Serum Pro-Brain Natriuretic Peptide: 1795 pg/mL (06-29 @ 03:30)  Serum Pro-Brain Natriuretic Peptide: 2348 pg/mL (06-28 @ 17:55)    Lipid Profile:   HgA1c:   TSH:       CARDIAC MARKERS:            TELEMETRY: 	    ECG:  	  RADIOLOGY:  OTHER: 	    PREVIOUS DIAGNOSTIC TESTING:    [ ] Echocardiogram:  [ ]  Catheterization:  [ ] Stress Test:  	  	  ASSESSMENT/PLAN: Date of Admission: 6/28/18    CHIEF COMPLAINT: SOB    HISTORY OF PRESENT ILLNESS:              Allergies    Isordil (Headache)  penicillin (Rash)    Intolerances    	    MEDICATIONS:  aspirin enteric coated 81 milliGRAM(s) Oral daily  captopril 6.25 milliGRAM(s) Oral three times a day  furosemide Infusion 10 mG/Hr IV Continuous <Continuous>  heparin  Injectable 5000 Unit(s) SubCutaneous every 8 hours  milrinone Infusion 0.25 MICROgram(s)/kG/Min IV Continuous <Continuous>        gabapentin 100 milliGRAM(s) Oral every 8 hours    famotidine    Tablet 20 milliGRAM(s) Oral two times a day    dextrose 40% Gel 15 Gram(s) Oral once PRN  dextrose 50% Injectable 12.5 Gram(s) IV Push once  dextrose 50% Injectable 25 Gram(s) IV Push once  dextrose 50% Injectable 25 Gram(s) IV Push once  glucagon  Injectable 1 milliGRAM(s) IntraMuscular once PRN  insulin lispro (HumaLOG) corrective regimen sliding scale   SubCutaneous three times a day before meals  insulin lispro (HumaLOG) corrective regimen sliding scale   SubCutaneous at bedtime  levothyroxine 175 MICROGram(s) Oral daily    calcitriol   Capsule 1 MICROGram(s) Oral daily  calcium acetate 667 milliGRAM(s) Oral three times a day with meals  calcium carbonate 1250 mG + Vitamin D (OsCal 500 + D) 1 Tablet(s) Oral three times a day  chlorhexidine 4% Liquid 1 Application(s) Topical <User Schedule>  cholecalciferol 1000 Unit(s) Oral daily  dextrose 5%. 1000 milliLiter(s) IV Continuous <Continuous>      PAST MEDICAL & SURGICAL HISTORY:  Asthma  CHF (congestive heart failure): hypothyroid  DM (diabetes mellitus)  HTN (hypertension)  Thyroid ca  S/P thyroidectomy      FAMILY HISTORY:  No pertinent family history in first degree relatives      SOCIAL HISTORY:    [ ] Non-smoker  [ ] Smoker  [ ] Alcohol      REVIEW OF SYSTEMS:  CONSTITUTIONAL: No fever, weight loss, or fatigue  EYES: No eye pain, visual disturbances, or discharge  ENMT:  No difficulty hearing, tinnitus, vertigo; No sinus or throat pain  NECK: No pain or stiffness  RESPIRATORY: No cough, wheezing, chills or hemoptysis; No Shortness of Breath  CARDIOVASCULAR: No chest pain, palpitations, passing out, dizziness, or leg swelling  GASTROINTESTINAL: No abdominal or epigastric pain. No nausea, vomiting, or hematemesis; No diarrhea or constipation. No melena or hematochezia.  GENITOURINARY: No dysuria, frequency, hematuria, or incontinence  NEUROLOGICAL: No headaches, memory loss, loss of strength, numbness, or tremors  SKIN: No itching, burning, rashes, or lesions   LYMPH Nodes: No enlarged glands  ENDOCRINE: No heat or cold intolerance; No hair loss  MUSCULOSKELETAL: No joint pain or swelling; No muscle, back, or extremity pain  PSYCHIATRIC: No depression, anxiety, mood swings, or difficulty sleeping  HEME/LYMPH: No easy bruising, or bleeding gums  ALLERY AND IMMUNOLOGIC: No hives or eczema	    [ ] All others negative	  [ ] Unable to obtain    PHYSICAL EXAM:  T(C): 36.3 (06-29-18 @ 08:00), Max: 36.9 (06-29-18 @ 04:00)  HR: 92 (06-29-18 @ 15:00) (80 - 105)  BP: 104/74 (06-29-18 @ 15:00) (86/52 - 130/92)  RR: 15 (06-29-18 @ 15:00) (14 - 24)  SpO2: 96% (06-29-18 @ 09:00) (96% - 100%)  Wt(kg): --  I&O's Summary    28 Jun 2018 07:01  -  29 Jun 2018 07:00  --------------------------------------------------------  IN: 605 mL / OUT: 3150 mL / NET: -2545 mL    29 Jun 2018 07:01  -  29 Jun 2018 15:36  --------------------------------------------------------  IN: 882.5 mL / OUT: 1450 mL / NET: -567.5 mL        Appearance: Normal	  HEENT:   Normal oral mucosa, PERRL, EOMI	  Lymphatic: No lymphadenopathy  Cardiovascular: Normal S1 S2, No JVD, No murmurs, No edema  Respiratory: Lungs clear to auscultation	  Psychiatry: A & O x 3, Mood & affect appropriate  Gastrointestinal:  Soft, Non-tender, + BS	  Skin: No rashes, No ecchymoses, No cyanosis	  Neurologic: Non-focal  Extremities: Normal range of motion, No clubbing, cyanosis or edema  Vascular: Peripheral pulses palpable 2+ bilaterally        LABS:	 	    CBC Full  -  ( 29 Jun 2018 03:30 )  WBC Count : 6.10 K/uL  Hemoglobin : 10.7 g/dL  Hematocrit : 32.3 %  Platelet Count - Automated : 246 K/uL  Mean Cell Volume : 72.7 fL  Mean Cell Hemoglobin : 24.1 pg  Mean Cell Hemoglobin Concentration : 33.1 %  Auto Neutrophil # : x  Auto Lymphocyte # : x  Auto Monocyte # : x  Auto Eosinophil # : x  Auto Basophil # : x  Auto Neutrophil % : x  Auto Lymphocyte % : x  Auto Monocyte % : x  Auto Eosinophil % : x  Auto Basophil % : x    06-29    132<L>  |  91<L>  |  35<H>  ----------------------------<  128<H>  3.6   |  22  |  1.19  06-28    131<L>  |  86<L>  |  37<H>  ----------------------------<  145<H>  5.9<H>   |  20<L>  |  1.30    Ca    5.9<LL>      29 Jun 2018 03:30  Ca    6.5<LL>      28 Jun 2018 17:55  Phos  5.6     06-29  Mg     1.8     06-29    TPro  7.1  /  Alb  3.4  /  TBili  5.3<H>  /  DBili  x   /  AST  118<H>  /  ALT  80<H>  /  AlkPhos  565<H>  06-29  TPro  8.6<H>  /  Alb  4.0  /  TBili  6.0<H>  /  DBili  x   /  AST  161<H>  /  ALT  99<H>  /  AlkPhos  675<H>  06-28      proBNP: Serum Pro-Brain Natriuretic Peptide: 1795 pg/mL (06-29 @ 03:30)  Serum Pro-Brain Natriuretic Peptide: 2348 pg/mL (06-28 @ 17:55)    Lipid Profile:   HgA1c:   TSH:       CARDIAC MARKERS:            TELEMETRY: 	    ECG:  	  RADIOLOGY:  OTHER: 	    PREVIOUS DIAGNOSTIC TESTING:    [ ] Echocardiogram:  [ ]  Catheterization:  [ ] Stress Test:  	  	  ASSESSMENT/PLAN: Date of Admission: 18    CHIEF COMPLAINT: SOB    HISTORY OF PRESENT ILLNESS:    This is a 62 year old woman with a past medical history of HTN, HLD, DM II, thyroid cancer s/p thyroidectomy, chronic systolic heart failure (LVEF 10%, LVIDd 6.6 cm), first diagnosed in , CRT-ICD placement in 2017. She had recent cardiac cath at [facility] with Dr. Xander Chavarria and reports  normal coronaries. Pt was admitted to [facility] 18-18 for acute on chronic systolic HF, course in hospital complicated by cardiogenic shock requiring IV inotropic support and episodes of NSVT. RHC on 18 which showed RA of 28, PCWP 37, PA 57/39/46 , EDP 23, PA sat 34.7%, CO 2.72, CI 1.21. She was offered a transfer to [facility] for consideration in LVAD, but patient refused. [place] was kept in and meds adjusted and she was eventually weaned off inotrope.  Of note, she was seen in [facility] clinic on 18 and was in ADHF, was told she should be admitted for treatment, but patient refused admission. She has had multiple admissions for heart failure over the last year at various hospitals.       Allergies    Isordil (Headache)  penicillin (Rash)  	    MEDICATIONS:  aspirin enteric coated 81 milliGRAM(s) Oral daily  captopril 6.25 milliGRAM(s) Oral three times a day  furosemide Infusion 10 mG/Hr IV Continuous <Continuous>  heparin  Injectable 5000 Unit(s) SubCutaneous every 8 hours  milrinone Infusion 0.25 MICROgram(s)/kG/Min IV Continuous <Continuous>  gabapentin 100 milliGRAM(s) Oral every 8 hours  famotidine    Tablet 20 milliGRAM(s) Oral two times a day  dextrose 40% Gel 15 Gram(s) Oral once PRN  dextrose 50% Injectable 12.5 Gram(s) IV Push once  dextrose 50% Injectable 25 Gram(s) IV Push once  dextrose 50% Injectable 25 Gram(s) IV Push once  glucagon  Injectable 1 milliGRAM(s) IntraMuscular once PRN  insulin lispro (HumaLOG) corrective regimen sliding scale   SubCutaneous three times a day before meals  insulin lispro (HumaLOG) corrective regimen sliding scale   SubCutaneous at bedtime  levothyroxine 175 MICROGram(s) Oral daily  calcitriol   Capsule 1 MICROGram(s) Oral daily  calcium acetate 667 milliGRAM(s) Oral three times a day with meals  calcium carbonate 1250 mG + Vitamin D (OsCal 500 + D) 1 Tablet(s) Oral three times a day  chlorhexidine 4% Liquid 1 Application(s) Topical <User Schedule>  cholecalciferol 1000 Unit(s) Oral daily  dextrose 5%. 1000 milliLiter(s) IV Continuous <Continuous>      PAST MEDICAL & SURGICAL HISTORY:  Asthma  CHF (congestive heart failure): hypothyroid  DM (diabetes mellitus)  HTN (hypertension)  Thyroid ca  S/P thyroidectomy      FAMILY HISTORY:  No pertinent family history in first degree relatives      SOCIAL HISTORY:    [ ] Non-smoker  [ ] Smoker  [ ] Alcohol      REVIEW OF SYSTEMS:  CONSTITUTIONAL: No fever, weight loss, or fatigue  EYES: No eye pain, visual disturbances, or discharge  ENMT:  No difficulty hearing, tinnitus, vertigo; No sinus or throat pain  NECK: No pain or stiffness  RESPIRATORY: No cough, wheezing, chills or hemoptysis; No Shortness of Breath  CARDIOVASCULAR: No chest pain, palpitations, passing out, dizziness, or leg swelling  GASTROINTESTINAL: No abdominal or epigastric pain. No nausea, vomiting, or hematemesis; No diarrhea or constipation. No melena or hematochezia.  GENITOURINARY: No dysuria, frequency, hematuria, or incontinence  NEUROLOGICAL: No headaches, memory loss, loss of strength, numbness, or tremors  SKIN: No itching, burning, rashes, or lesions   LYMPH Nodes: No enlarged glands  ENDOCRINE: No heat or cold intolerance; No hair loss  MUSCULOSKELETAL: No joint pain or swelling; No muscle, back, or extremity pain  PSYCHIATRIC: No depression, anxiety, mood swings, or difficulty sleeping  HEME/LYMPH: No easy bruising, or bleeding gums  ALLERY AND IMMUNOLOGIC: No hives or eczema	      PHYSICAL EXAM:  T(C): 36.3 (18 @ 08:00), Max: 36.9 (18 @ 04:00)  HR: 92 (18 @ 15:00) (80 - 105)  BP: 104/74 (18 @ 15:00) (86/52 - 130/92)  RR: 15 (18 @ 15:00) (14 - 24)  SpO2: 96% (18 @ 09:00) (96% - 100%)  Wt(kg): --  I&O's Summary    2018 07:  -  2018 07:00  --------------------------------------------------------  IN: 605 mL / OUT: 3150 mL / NET: -2545 mL    2018 07:  -  2018 15:36  --------------------------------------------------------  IN: 882.5 mL / OUT: 1450 mL / NET: -567.5 mL        Appearance: Normal	  HEENT:   Normal oral mucosa, PERRL, EOMI	  Lymphatic: No lymphadenopathy  Cardiovascular: Normal S1 S2, No JVD, No murmurs, No edema  Respiratory: Lungs clear to auscultation	  Psychiatry: A & O x 3, Mood & affect appropriate  Gastrointestinal:  Soft, Non-tender, + BS	  Skin: No rashes, No ecchymoses, No cyanosis	  Neurologic: Non-focal  Extremities: Normal range of motion, No clubbing, cyanosis or edema  Vascular: Peripheral pulses palpable 2+ bilaterally        LABS:	 	    CBC Full  -  ( 2018 03:30 )  WBC Count : 6.10 K/uL  Hemoglobin : 10.7 g/dL  Hematocrit : 32.3 %  Platelet Count - Automated : 246 K/uL  Mean Cell Volume : 72.7 fL  Mean Cell Hemoglobin : 24.1 pg  Mean Cell Hemoglobin Concentration : 33.1 %          132<L>  |  91<L>  |  35<H>  ----------------------------<  128<H>  3.6   |  22  |  1.19      131<L>  |  86<L>  |  37<H>  ----------------------------<  145<H>  5.9<H>   |  20<L>  |  1.30    Ca    5.9<LL>      2018 03:30  Ca    6.5<LL>      2018 17:55  Phos  5.6       Mg     1.8         TPro  7.1  /  Alb  3.4  /  TBili  5.3<H>  /  DBili  x   /  AST  118<H>  /  ALT  80<H>  /  AlkPhos  565<H>    TPro  8.6<H>  /  Alb  4.0  /  TBili  6.0<H>  /  DBili  x   /  AST  161<H>  /  ALT  99<H>  /  AlkPhos  675<H>        proBNP: Serum Pro-Brain Natriuretic Peptide: 1795 pg/mL ( @ 03:30)  Serum Pro-Brain Natriuretic Peptide: 2348 pg/mL ( @ 17:55)    < from: TTE with Doppler (w/Cont) (18 @ 10:45) >  DIMENSIONS:  Dimensions:     Normal Values:  LA:     4.2 cm    2.0 - 4.0 cm  Ao:     2.7 cm    2.0 - 3.8 cm  SEPTUM: 0.7 cm    0.6 - 1.2 cm  PWT:    0.7 cm    0.6 - 1.1 cm  LVIDd:  6.6 cm    3.0 - 5.6 cm  LVIDs:  6.3 cm    1.8 - 4.0 cm  Derived Variables:  LVMI: 90 g/m2  RWT: 0.21  Fractional short: 5 %  Ejection Fraction (Teicholtz): 10 %  ------------------------------------------------------------------------  OBSERVATIONS:  Mitral Valve: Mitral annular calcification, otherwise  normal mitral valve. Moderate mitral regurgitation.  Aortic Root: Normal aortic root.  Aortic Valve: Calcified trileaflet aortic valve with normal  opening.  Left Atrium: Moderately dilated left atrium.  LA volume  index = 42 cc/m2.  Left Ventricle: Severe global left ventricular systolic  dysfunction.  Endocardial visualization enhanced with  intravenous injection of echo contrast (Definity).  No LV  thrombus seen. Moderate left ventricular enlargement.  Right Heart: Moderate right atrial enlargement. Right  ventricular enlargement with decreased right ventricular  systolic function.  A device wire is noted in the right  heart. Normal tricuspid valve.  Severe tricuspid  regurgitation. Normal pulmonic valve.  Pericardium/PleuraNormal pericardium with no pericardial  effusion.    < end of copied text >      < from: Cardiac Cath Lab (14 @ 16:49) >  CORONARY VESSELS: The coronary circulation is co-dominant.  LM:   -- LM: Normal.  LAD:   --  LAD: Normal.  CX:   --  Circumflex: Normal.  RCA:   --  RCA: Normal.  COMPLICATIONS: There were no complications.    < end of copied text >      < from: Cardiac Cath Lab - Adult (18 @ 11:46) >  Pressures:  Baseline  Pressures:  - HR: 70  Pressures:  - Rhythm:  Pressures:  -- Pulmonary Artery (S/D/M): 57/39/46  Pressures:  -- Pulmonary Capillary Wedge: 39/45/37  Pressures:  -- Right Atrium (a/v/M): 30/33/28  Pressures:  -- Right Ventricle (s/edp): 50/23/--  O2 Sats:  Baseline  O2 Sats:  - HR: 70  O2 Sats:  - Rhythm:  O2 Sats:  -- AO: 11.7/100/15.91  O2 Sats:  -- PA: 11.7/35.2/5.6  O2 Sats:  -- PA: 11.7/34.7/5.52  Outputs:  Baseline  Outputs:  -- CALCULATIONS[age]  Outputs:  -- CALCULATIONS: Body Surface Area: 2.25  Outputs:  -- CALCULATIONS: Height in cm: 170.00  Outputs:  -- CALCULATIONS: Sex: Female  Outputs:  -- CALCULATIONS: Weight in k.00  Outputs:  -- OUTPUTS: CO by Salvador: 2.72  Outputs:  -- OUTPUTS: [place] cardiac index: 1.21  Outputs:  -- OUTPUTS: O2 consumption: 281.40  Outputs:  -- OUTPUTS: Vo2 Indexed: 125.00  Outputs:  -- RESISTANCES: Pulmonary vascular index (dsc): 596.05  Outputs:  -- RESISTANCES: Pulmonary vascular index (Wood Units): 7.45  Outputs:  -- RESISTANCES: Pulmonary vascular resistance (dsc): 264.77  Outputs:  -- RESISTANCES: Pulmonary vascular resistance (Wood Units): 3.31  Outputs:  -- RESISTANCES: Right ventricular stroke work: 7.92  Outputs:  -- RESISTANCES: Right ventricular stroke work index: 3.52  Outputs:  -- RESISTANCES: Total pulmonary index (dsc): 3046.50  Outputs:  -- RESISTANCES: Total pulmonary index (Wood Units): 38.09  Outputs:  -- RESISTANCES: Total pulmonary resistance (dsc): 1353.26  Outputs:  -- RESISTANCES: Total pulmonary resistance (Wood Units): 16.92  Outputs:  -- SHUNTS: Pulmonary flow: 2.72  Outputs:  -- SHUNTS: Qp Indexed: 1.21  Outputs:  -- SHUNTS: Qs Indexed: 1.21  Outputs:  -- SHUNTS: Systemic flow: 2.72    < end of copied text > Date of Admission: 18    CHIEF COMPLAINT: SOB    HISTORY OF PRESENT ILLNESS:    This is a 62 year old woman with a past medical history of HTN, HLD, DM II, thyroid cancer s/p thyroidectomy, chronic systolic heart failure (LVEF 10%, LVIDd 6.6 cm), first diagnosed in , CRT-ICD placement in 2017. She had recent cardiac cath at [place] with Dr. Xander Chavarria and reports  normal coronaries. Pt was admitted to [facility] 18-18 for acute on chronic systolic HF, course in hospital complicated by cardiogenic shock requiring IV inotropic support and episodes of NSVT. RHC on 18 which showed RA of 28, PCWP 37, PA 57/39/46 , EDP 23, PA sat 34.7%, CO 2.72, CI 1.21. She was offered a transfer to [facility] for consideration in LVAD, but patient refused. [place] was kept in and meds adjusted and she was eventually weaned off inotrope.  Of note, she was seen in [facility] clinic on 18 and was in ADHF, was told she should be admitted for treatment, but patient refused admission. She has had multiple admissions for heart failure over the last year at various hospitals.       Allergies    Isordil (Headache)  penicillin (Rash)  	    MEDICATIONS:  aspirin enteric coated 81 milliGRAM(s) Oral daily  captopril 6.25 milliGRAM(s) Oral three times a day  furosemide Infusion 10 mG/Hr IV Continuous <Continuous>  heparin  Injectable 5000 Unit(s) SubCutaneous every 8 hours  milrinone Infusion 0.25 MICROgram(s)/kG/Min IV Continuous <Continuous>  gabapentin 100 milliGRAM(s) Oral every 8 hours  famotidine    Tablet 20 milliGRAM(s) Oral two times a day  dextrose 40% Gel 15 Gram(s) Oral once PRN  dextrose 50% Injectable 12.5 Gram(s) IV Push once  dextrose 50% Injectable 25 Gram(s) IV Push once  dextrose 50% Injectable 25 Gram(s) IV Push once  glucagon  Injectable 1 milliGRAM(s) IntraMuscular once PRN  insulin lispro (HumaLOG) corrective regimen sliding scale   SubCutaneous three times a day before meals  insulin lispro (HumaLOG) corrective regimen sliding scale   SubCutaneous at bedtime  levothyroxine 175 MICROGram(s) Oral daily  calcitriol   Capsule 1 MICROGram(s) Oral daily  calcium acetate 667 milliGRAM(s) Oral three times a day with meals  calcium carbonate 1250 mG + Vitamin D (OsCal 500 + D) 1 Tablet(s) Oral three times a day  chlorhexidine 4% Liquid 1 Application(s) Topical <User Schedule>  cholecalciferol 1000 Unit(s) Oral daily  dextrose 5%. 1000 milliLiter(s) IV Continuous <Continuous>      PAST MEDICAL & SURGICAL HISTORY:  Asthma  CHF (congestive heart failure): hypothyroid  DM (diabetes mellitus)  HTN (hypertension)  Thyroid ca  S/P thyroidectomy      FAMILY HISTORY:  No pertinent family history in first degree relatives      SOCIAL HISTORY:    Non-smoker, no ETOH or drug abuse.       REVIEW OF SYSTEMS:  CONSTITUTIONAL: No fever, weight loss, or fatigue  EYES: No eye pain, visual disturbances, or discharge  ENMT:  No difficulty hearing, tinnitus, vertigo; No sinus or throat pain  NECK: No pain or stiffness  RESPIRATORY: No cough, wheezing, chills or hemoptysis; admits to Shortness of Breath  CARDIOVASCULAR: No chest pain, palpitations, passing out, dizziness, admits to leg swelling  GASTROINTESTINAL: No abdominal or epigastric pain. No nausea, vomiting, or hematemesis; No diarrhea or constipation. No melena or hematochezia.  GENITOURINARY: No dysuria, frequency, hematuria, or incontinence  NEUROLOGICAL: No headaches, memory loss, loss of strength, numbness, or tremors  SKIN: No itching, burning, rashes, or lesions   LYMPH Nodes: No enlarged glands  ENDOCRINE: No heat or cold intolerance; No hair loss  MUSCULOSKELETAL: No joint pain or swelling; No muscle, back, or extremity pain  PSYCHIATRIC: No depression, anxiety, mood swings, or difficulty sleeping  HEME/LYMPH: No easy bruising, or bleeding gums  ALLERY AND IMMUNOLOGIC: No hives or eczema	      PHYSICAL EXAM:  T(C): 36.3 (18 @ 08:00), Max: 36.9 (18 @ 04:00)  HR: 92 (18 @ 15:00) (80 - 105)  BP: 104/74 (18 @ 15:00) (86/52 - 130/92)  RR: 15 (18 @ 15:00) (14 - 24)  SpO2: 96% (18 @ 09:00) (96% - 100%)  Wt(kg): --  I&O's Summary    2018 07:  -  2018 07:00  --------------------------------------------------------  IN: 605 mL / OUT: 3150 mL / NET: -2545 mL    2018 07:  -  2018 15:36  --------------------------------------------------------  IN: 882.5 mL / OUT: 1450 mL / NET: -567.5 mL        Appearance: Normal	  HEENT:   Normal oral mucosa, PERRL, EOMI	  Lymphatic: No lymphadenopathy  Cardiovascular: RRR S1 S2, JVP 12, No murmurs, trace b/l LE edema, warm b/l.   Respiratory: Lungs clear to auscultation	  Psychiatry: A & O x 3, Mood & affect appropriate  Gastrointestinal:  Soft, Non-tender, + BS	  Skin: No rashes, No ecchymoses, No cyanosis	  Neurologic: Non-focal  Extremities: Normal range of motion, No clubbing, cyanosis   Vascular: Peripheral pulses palpable 2+ bilaterally        LABS:	 	    CBC Full  -  ( 2018 03:30 )  WBC Count : 6.10 K/uL  Hemoglobin : 10.7 g/dL  Hematocrit : 32.3 %  Platelet Count - Automated : 246 K/uL  Mean Cell Volume : 72.7 fL  Mean Cell Hemoglobin : 24.1 pg  Mean Cell Hemoglobin Concentration : 33.1 %          132<L>  |  91<L>  |  35<H>  ----------------------------<  128<H>  3.6   |  22  |  1.19      131<L>  |  86<L>  |  37<H>  ----------------------------<  145<H>  5.9<H>   |  20<L>  |  1.30    Ca    5.9<LL>      2018 03:30  Ca    6.5<LL>      2018 17:55  Phos  5.6       Mg     1.8         TPro  7.1  /  Alb  3.4  /  TBili  5.3<H>  /  DBili  x   /  AST  118<H>  /  ALT  80<H>  /  AlkPhos  565<H>    TPro  8.6<H>  /  Alb  4.0  /  TBili  6.0<H>  /  DBili  x   /  AST  161<H>  /  ALT  99<H>  /  AlkPhos  675<H>        proBNP: Serum Pro-Brain Natriuretic Peptide: 1795 pg/mL ( @ 03:30)  Serum Pro-Brain Natriuretic Peptide: 2348 pg/mL ( @ 17:55)    < from: TTE with Doppler (w/Cont) (18 @ 10:45) >  DIMENSIONS:  Dimensions:     Normal Values:  LA:     4.2 cm    2.0 - 4.0 cm  Ao:     2.7 cm    2.0 - 3.8 cm  SEPTUM: 0.7 cm    0.6 - 1.2 cm  PWT:    0.7 cm    0.6 - 1.1 cm  LVIDd:  6.6 cm    3.0 - 5.6 cm  LVIDs:  6.3 cm    1.8 - 4.0 cm  Derived Variables:  LVMI: 90 g/m2  RWT: 0.21  Fractional short: 5 %  Ejection Fraction (Teicholtz): 10 %  ------------------------------------------------------------------------  OBSERVATIONS:  Mitral Valve: Mitral annular calcification, otherwise  normal mitral valve. Moderate mitral regurgitation.  Aortic Root: Normal aortic root.  Aortic Valve: Calcified trileaflet aortic valve with normal  opening.  Left Atrium: Moderately dilated left atrium.  LA volume  index = 42 cc/m2.  Left Ventricle: Severe global left ventricular systolic  dysfunction.  Endocardial visualization enhanced with  intravenous injection of echo contrast (Definity).  No LV  thrombus seen. Moderate left ventricular enlargement.  Right Heart: Moderate right atrial enlargement. Right  ventricular enlargement with decreased right ventricular  systolic function.  A device wire is noted in the right  heart. Normal tricuspid valve.  Severe tricuspid  regurgitation. Normal pulmonic valve.  Pericardium/PleuraNormal pericardium with no pericardial  effusion.    < end of copied text >      < from: Cardiac Cath Lab (14 @ 16:49) >  CORONARY VESSELS: The coronary circulation is co-dominant.  LM:   -- LM: Normal.  LAD:   --  LAD: Normal.  CX:   --  Circumflex: Normal.  RCA:   --  RCA: Normal.  COMPLICATIONS: There were no complications.    < end of copied text >      < from: Cardiac Cath Lab - Adult (18 @ 11:46) >  Pressures:  Baseline  Pressures:  - HR: 70  Pressures:  - Rhythm:  Pressures:  -- Pulmonary Artery (S/D/M): 57/39/46  Pressures:  -- Pulmonary Capillary Wedge: 39/45/37  Pressures:  -- Right Atrium (a/v/M): 30/33/28  Pressures:  -- Right Ventricle (s/edp): 50/23/--  O2 Sats:  Baseline  O2 Sats:  - HR: 70  O2 Sats:  - Rhythm:  O2 Sats:  -- AO: 11.7/100/15.91  O2 Sats:  -- PA: 11.7/35.2/5.6  O2 Sats:  -- PA: 11.7/34.7/5.52  Outputs:  Baseline  Outputs:  -- CALCULATIONS[age]  Outputs:  -- CALCULATIONS: Body Surface Area: 2.25  Outputs:  -- CALCULATIONS: Height in cm: 170.00  Outputs:  -- CALCULATIONS: Sex: Female  Outputs:  -- CALCULATIONS: Weight in k.00  Outputs:  -- OUTPUTS: CO by Salvador: 2.72  Outputs:  -- OUTPUTS: Salvador cardiac index: 1.21  Outputs:  -- OUTPUTS: O2 consumption: 281.40  Outputs:  -- OUTPUTS: Vo2 Indexed: 125.00  Outputs:  -- RESISTANCES: Pulmonary vascular index (dsc): 596.05  Outputs:  -- RESISTANCES: Pulmonary vascular index (Wood Units): 7.45  Outputs:  -- RESISTANCES: Pulmonary vascular resistance (dsc): 264.77  Outputs:  -- RESISTANCES: Pulmonary vascular resistance (Wood Units): 3.31  Outputs:  -- RESISTANCES: Right ventricular stroke work: 7.92  Outputs:  -- RESISTANCES: Right ventricular stroke work index: 3.52  Outputs:  -- RESISTANCES: Total pulmonary index (dsc): 3046.50  Outputs:  -- RESISTANCES: Total pulmonary index (Wood Units): 38.09  Outputs:  -- RESISTANCES: Total pulmonary resistance (dsc): 1353.26  Outputs:  -- RESISTANCES: Total pulmonary resistance (Wood Units): 16.92  Outputs:  -- SHUNTS: Pulmonary flow: 2.72  Outputs:  -- SHUNTS: Qp Indexed: 1.21  Outputs:  -- SHUNTS: Qs Indexed: 1.21  Outputs:  -- SHUNTS: Systemic flow: 2.72    < end of copied text > Date of Admission: 18    CHIEF COMPLAINT: SOB    HISTORY OF PRESENT ILLNESS:    This is a 62 year old woman with a past medical history of HTN, HLD, DM II, thyroid cancer, chronic systolic heart failure (LVEF 10%, LVIDd 6.6 cm), first diagnosed in , CRT-ICD placement in 2017. She had recent cardiac cath at [facility] with Dr. Xander Chavarria and reports  normal coronaries. Pt was admitted to [facility] 18-18 for acute on chronic systolic HF, course in hospital complicated by cardiogenic shock requiring IV inotropic support and episodes of NSVT. RHC on 18 which showed RA of 28, PCWP 37, PA 57/39/46 , EDP 23, PA sat 34.7%, CO 2.72, CI 1.21. She was offered a transfer to [facility] for consideration in LVAD, but patient refused. [place] was kept in and meds adjusted and she was eventually weaned off inotrope.  Of note, she was seen in [facility] clinic on 18 and was in ADHF, was told she should be admitted for treatment, but patient refused admission. She has had multiple admissions for heart failure over the last year at various hospitals.       Allergies    Isordil (Headache)  penicillin (Rash)  	    MEDICATIONS:  aspirin enteric coated 81 milliGRAM(s) Oral daily  captopril 6.25 milliGRAM(s) Oral three times a day  furosemide Infusion 10 mG/Hr IV Continuous <Continuous>  heparin  Injectable 5000 Unit(s) SubCutaneous every 8 hours  milrinone Infusion 0.25 MICROgram(s)/kG/Min IV Continuous <Continuous>  gabapentin 100 milliGRAM(s) Oral every 8 hours  famotidine    Tablet 20 milliGRAM(s) Oral two times a day  dextrose 40% Gel 15 Gram(s) Oral once PRN  dextrose 50% Injectable 12.5 Gram(s) IV Push once  dextrose 50% Injectable 25 Gram(s) IV Push once  dextrose 50% Injectable 25 Gram(s) IV Push once  glucagon  Injectable 1 milliGRAM(s) IntraMuscular once PRN  insulin lispro (HumaLOG) corrective regimen sliding scale   SubCutaneous three times a day before meals  insulin lispro (HumaLOG) corrective regimen sliding scale   SubCutaneous at bedtime  levothyroxine 175 MICROGram(s) Oral daily  calcitriol   Capsule 1 MICROGram(s) Oral daily  calcium acetate 667 milliGRAM(s) Oral three times a day with meals  calcium carbonate 1250 mG + Vitamin D (OsCal 500 + D) 1 Tablet(s) Oral three times a day  chlorhexidine 4% Liquid 1 Application(s) Topical <User Schedule>  cholecalciferol 1000 Unit(s) Oral daily  dextrose 5%. 1000 milliLiter(s) IV Continuous <Continuous>      PAST MEDICAL & SURGICAL HISTORY:  Asthma  CHF (congestive heart failure): hypothyroid  DM (diabetes mellitus)  HTN (hypertension)  Thyroid ca  S/P thyroidectomy      FAMILY HISTORY:  No pertinent family history in first degree relatives      SOCIAL HISTORY:    Non-smoker, no ETOH or drug abuse.       REVIEW OF SYSTEMS:  CONSTITUTIONAL: No fever, weight loss, or fatigue  EYES: No eye pain, visual disturbances, or discharge  ENMT:  No difficulty hearing, tinnitus, vertigo; No sinus or throat pain  NECK: No pain or stiffness  RESPIRATORY: No cough, wheezing, chills or hemoptysis; admits to Shortness of Breath  CARDIOVASCULAR: No chest pain, palpitations, passing out, dizziness, admits to leg swelling  GASTROINTESTINAL: No abdominal or epigastric pain. No nausea, vomiting, or hematemesis; No diarrhea or constipation. No melena or hematochezia.  GENITOURINARY: No dysuria, frequency, hematuria, or incontinence  NEUROLOGICAL: No headaches, memory loss, loss of strength, numbness, or tremors  SKIN: No itching, burning, rashes, or lesions   LYMPH Nodes: No enlarged glands  ENDOCRINE: No heat or cold intolerance; No hair loss  MUSCULOSKELETAL: No joint pain or swelling; No muscle, back, or extremity pain  PSYCHIATRIC: No depression, anxiety, mood swings, or difficulty sleeping  HEME/LYMPH: No easy bruising, or bleeding gums  ALLERY AND IMMUNOLOGIC: No hives or eczema	      PHYSICAL EXAM:  T(C): 36.3 (18 @ 08:00), Max: 36.9 (18 @ 04:00)  HR: 92 (18 @ 15:00) (80 - 105)  BP: 104/74 (18 @ 15:00) (86/52 - 130/92)  RR: 15 (18 @ 15:00) (14 - 24)  SpO2: 96% (18 @ 09:00) (96% - 100%)  Wt(kg): --  I&O's Summary    2018 07:  -  2018 07:00  --------------------------------------------------------  IN: 605 mL / OUT: 3150 mL / NET: -2545 mL    2018 07:  -  2018 15:36  --------------------------------------------------------  IN: 882.5 mL / OUT: 1450 mL / NET: -567.5 mL        Appearance: Normal	  HEENT:   Normal oral mucosa, PERRL, EOMI	  Lymphatic: No lymphadenopathy  Cardiovascular: RRR S1 S2, JVP 12, No murmurs, trace b/l LE edema, warm b/l.   Respiratory: Lungs clear to auscultation	  Psychiatry: A & O x 3, Mood & affect appropriate  Gastrointestinal:  Soft, Non-tender, + BS	  Skin: No rashes, No ecchymoses, No cyanosis	  Neurologic: Non-focal  Extremities: Normal range of motion, No clubbing, cyanosis   Vascular: Peripheral pulses palpable 2+ bilaterally        LABS:	 	    CBC Full  -  ( 2018 03:30 )  WBC Count : 6.10 K/uL  Hemoglobin : 10.7 g/dL  Hematocrit : 32.3 %  Platelet Count - Automated : 246 K/uL  Mean Cell Volume : 72.7 fL  Mean Cell Hemoglobin : 24.1 pg  Mean Cell Hemoglobin Concentration : 33.1 %          132<L>  |  91<L>  |  35<H>  ----------------------------<  128<H>  3.6   |  22  |  1.19      131<L>  |  86<L>  |  37<H>  ----------------------------<  145<H>  5.9<H>   |  20<L>  |  1.30    Ca    5.9<LL>      2018 03:30  Ca    6.5<LL>      2018 17:55  Phos  5.6       Mg     1.8         TPro  7.1  /  Alb  3.4  /  TBili  5.3<H>  /  DBili  x   /  AST  118<H>  /  ALT  80<H>  /  AlkPhos  565<H>    TPro  8.6<H>  /  Alb  4.0  /  TBili  6.0<H>  /  DBili  x   /  AST  161<H>  /  ALT  99<H>  /  AlkPhos  675<H>        proBNP: Serum Pro-Brain Natriuretic Peptide: 1795 pg/mL ( @ 03:30)  Serum Pro-Brain Natriuretic Peptide: 2348 pg/mL ( @ 17:55)    < from: TTE with Doppler (w/Cont) (18 @ 10:45) >  DIMENSIONS:  Dimensions:     Normal Values:  LA:     4.2 cm    2.0 - 4.0 cm  Ao:     2.7 cm    2.0 - 3.8 cm  SEPTUM: 0.7 cm    0.6 - 1.2 cm  PWT:    0.7 cm    0.6 - 1.1 cm  LVIDd:  6.6 cm    3.0 - 5.6 cm  LVIDs:  6.3 cm    1.8 - 4.0 cm  Derived Variables:  LVMI: 90 g/m2  RWT: 0.21  Fractional short: 5 %  Ejection Fraction (Teicholtz): 10 %  ------------------------------------------------------------------------  OBSERVATIONS:  Mitral Valve: Mitral annular calcification, otherwise  normal mitral valve. Moderate mitral regurgitation.  Aortic Root: Normal aortic root.  Aortic Valve: Calcified trileaflet aortic valve with normal  opening.  Left Atrium: Moderately dilated left atrium.  LA volume  index = 42 cc/m2.  Left Ventricle: Severe global left ventricular systolic  dysfunction.  Endocardial visualization enhanced with  intravenous injection of echo contrast (Definity).  No LV  thrombus seen. Moderate left ventricular enlargement.  Right Heart: Moderate right atrial enlargement. Right  ventricular enlargement with decreased right ventricular  systolic function.  A device wire is noted in the right  heart. Normal tricuspid valve.  Severe tricuspid  regurgitation. Normal pulmonic valve.  Pericardium/PleuraNormal pericardium with no pericardial  effusion.    < end of copied text >      < from: Cardiac Cath Lab (14 @ 16:49) >  CORONARY VESSELS: The coronary circulation is co-dominant.  LM:   -- LM: Normal.  LAD:   --  LAD: Normal.  CX:   --  Circumflex: Normal.  RCA:   --  RCA: Normal.  COMPLICATIONS: There were no complications.    < end of copied text >      < from: Cardiac Cath Lab - Adult (18 @ 11:46) >  Pressures:  Baseline  Pressures:  - HR: 70  Pressures:  - Rhythm:  Pressures:  -- Pulmonary Artery (S/D/M): 57/39/46  Pressures:  -- Pulmonary Capillary Wedge: 39/45/37  Pressures:  -- Right Atrium (a/v/M): 30/33/28  Pressures:  -- Right Ventricle (s/edp): 50/23/--  O2 Sats:  Baseline  O2 Sats:  - HR: 70  O2 Sats:  - Rhythm:  O2 Sats:  -- AO: 11.7/100/15.91  O2 Sats:  -- PA: 11.7/35.2/5.6  O2 Sats:  -- PA: 11.7/34.7/5.52  Outputs:  Baseline  Outputs:  -- CALCULATIONS[age]  Outputs:  -- CALCULATIONS: Body Surface Area: 2.25  Outputs:  -- CALCULATIONS: Height in cm: 170.00  Outputs:  -- CALCULATIONS: Sex: Female  Outputs:  -- CALCULATIONS: Weight in k.00  Outputs:  -- OUTPUTS: CO by Salvador: 2.72  Outputs:  -- OUTPUTS: Salvador cardiac index: 1.21  Outputs:  -- OUTPUTS: O2 consumption: 281.40  Outputs:  -- OUTPUTS: Vo2 Indexed: 125.00  Outputs:  -- RESISTANCES: Pulmonary vascular index (dsc): 596.05  Outputs:  -- RESISTANCES: Pulmonary vascular index (Wood Units): 7.45  Outputs:  -- RESISTANCES: Pulmonary vascular resistance (dsc): 264.77  Outputs:  -- RESISTANCES: Pulmonary vascular resistance (Wood Units): 3.31  Outputs:  -- RESISTANCES: Right ventricular stroke work: 7.92  Outputs:  -- RESISTANCES: Right ventricular stroke work index: 3.52  Outputs:  -- RESISTANCES: Total pulmonary index (dsc): 3046.50  Outputs:  -- RESISTANCES: Total pulmonary index (Wood Units): 38.09  Outputs:  -- RESISTANCES: Total pulmonary resistance (dsc): 1353.26  Outputs:  -- RESISTANCES: Total pulmonary resistance (Wood Units): 16.92  Outputs:  -- SHUNTS: Pulmonary flow: 2.72  Outputs:  -- SHUNTS: Qp Indexed: 1.21  Outputs:  -- SHUNTS: Qs Indexed: 1.21  Outputs:  -- SHUNTS: Systemic flow: 2.72    < end of copied text >

## 2018-06-29 NOTE — CHART NOTE - NSCHARTNOTEFT_GEN_A_CORE
The patient is a deidre 62yearold female with past medical history of hypertension, diabetes, thyroid cancer, with presumed removal of parathyroid gland, congestive nonischemic cardiomyopathy, congestive heart failure, and pulmonary hypertension who presents with acute decompensated systolic heart failure.    The patient has hypervolemic hyponatremia likely secondary to heart failure.  She also has hypocalcemia likely secondary to the fact that she likely has no parathyroid gland.      Endocrine.  SP IV Calcium 2grams and will give another dose now;  Increase Calcitrol to 1 mcg po qd    Renal.   Lasix gtt and Milrinone	    Gastrointestinal.  likely congestive hepatic pathology.  Trend LFT's     Phosphorus- Start  Phoslo to 667 tid.      CVS-  Hydralazine and nitrates??? for afterload reduction        Sayed Yaneth  [phone]

## 2018-06-29 NOTE — CONSULT NOTE ADULT - CONSULT REASON
acute on chronic systolic Heart failure
ADHF
non-sustained ventricular tachycardia in the setting of SEVERE chf decompensation

## 2018-06-29 NOTE — PROGRESS NOTE ADULT - PROBLEM SELECTOR PLAN 3
continue with levothyroxine 0.175mcg daily - Thyroid CA s/p thyroidectomy with chronic hypocalcemia (likely secondary to resection of parathyroid gland)  - Ca2+ today is 5.9; Replete Calcium to goal of 7.5  - Given 2 doses of Calcium gluconate IV 2 grams  - C/w Calcitriol, Phoslo, Calcium carbonate, and Vit. D3  - Appreciate endo reccs. - Thyroid CA s/p thyroidectomy with chronic hypocalcemia (likely secondary to resection of parathyroid gland)  - Ca2+ today is 5.9; Replete Calcium to goal of 7.5  - Given 2 doses of Calcium gluconate IV 2 grams  - C/w Calcitriol, Phoslo, Calcium carbonate, and Vit. D3  - Repeat BMP w/ Mg & Phos, LFTs.  - Check PTH, Vit. D, iron studies (given the MCV of 72). If iron is low, replete with IV iron.  - Appreciate nephrology reccs. - Thyroid CA s/p thyroidectomy with chronic hypocalcemia (likely secondary to resection of parathyroid gland)  - Ca2+ today is 5.9; Replete Calcium to goal of 7.5  - Given 2 doses of Calcium gluconate IV 2 grams  - C/w Calcitriol, Phoslo, Calcium carbonate, and Vit. D3  - Order CMP w/ Mg & Phos, LFTs.  - Check PTH, Vit. D, iron studies (given the MCV of 72). If iron is low, replete with IV iron.  - Appreciate nephrology reccs. - Thyroid CA s/p thyroidectomy with chronic hypocalcemia (likely secondary to resection of parathyroid gland)  - Ca2+ today is 5.9; Replete Calcium to goal of 7.5  - Given 2 doses of Calcium gluconate IV 2 grams  - C/w Calcitriol, Phoslo, Calcium carbonate, and Vit. D3  - Order CMP w/ Mg & Phos.  - Check PTH, Calcium, Vit. D, iron studies (given the MCV of 72). If iron is low, replete with IV iron.  - Appreciate nephrology reccs. - Thyroid CA s/p thyroidectomy with chronic hypocalcemia (likely secondary to resection of parathyroid gland)  - Ca2+ today is 5.9; Replete Calcium to goal of 7.5  - Given 2 doses of Calcium gluconate IV 2 grams  - C/w Calcitriol, Phoslo, Calcium carbonate, and Vit. D3  - 6/29: Ordered CMP w/ Mg & Phos.  - 6/29: Ordered PTH, Calcium, Vit. D, iron studies (given the MCV of 72). If iron is low, replete with IV iron.  - Appreciate nephrology reccs.

## 2018-06-29 NOTE — PROGRESS NOTE ADULT - SUBJECTIVE AND OBJECTIVE BOX
Patient denenies CP, breathing better Review of systems otherwise (-)  	  MEDICATIONS:  MEDICATIONS  (STANDING):  aspirin enteric coated 81 milliGRAM(s) Oral daily  calcitriol   Capsule 1 MICROGram(s) Oral daily  calcium acetate 667 milliGRAM(s) Oral three times a day with meals  calcium carbonate 1250 mG + Vitamin D (OsCal 500 + D) 1 Tablet(s) Oral three times a day  captopril 6.25 milliGRAM(s) Oral three times a day  chlorhexidine 4% Liquid 1 Application(s) Topical <User Schedule>  cholecalciferol 1000 Unit(s) Oral daily  dextrose 5%. 1000 milliLiter(s) (50 mL/Hr) IV Continuous <Continuous>  dextrose 50% Injectable 12.5 Gram(s) IV Push once  dextrose 50% Injectable 25 Gram(s) IV Push once  dextrose 50% Injectable 25 Gram(s) IV Push once  famotidine    Tablet 20 milliGRAM(s) Oral two times a day  furosemide Infusion 10 mG/Hr (5 mL/Hr) IV Continuous <Continuous>  gabapentin 100 milliGRAM(s) Oral every 8 hours  heparin  Injectable 5000 Unit(s) SubCutaneous every 8 hours  insulin lispro (HumaLOG) corrective regimen sliding scale   SubCutaneous three times a day before meals  insulin lispro (HumaLOG) corrective regimen sliding scale   SubCutaneous at bedtime  levothyroxine 175 MICROGram(s) Oral daily  milrinone Infusion 0.25 MICROgram(s)/kG/Min (7.5 mL/Hr) IV Continuous <Continuous>      LABS:	 	    CARDIAC MARKERS:                                10.7   6.10  )-----------( 246      ( 29 Jun 2018 03:30 )             32.3     Hemoglobin: 10.7 g/dL (06-29 @ 03:30)  Hemoglobin: 11.7 g/dL (06-28 @ 17:55)      06-29    132<L>  |  91<L>  |  35<H>  ----------------------------<  128<H>  3.6   |  22  |  1.19    Ca    5.9<LL>      29 Jun 2018 03:30  Phos  5.6     06-29  Mg     1.8     06-29    TPro  7.1  /  Alb  3.4  /  TBili  5.3<H>  /  DBili  x   /  AST  118<H>  /  ALT  80<H>  /  AlkPhos  565<H>  06-29    Creatinine Trend: 1.19<--, 1.30<--, 0.91<--, 0.93<--, 0.93<--    COAGS:       proBNP: Serum Pro-Brain Natriuretic Peptide: 1795 pg/mL (06-29 @ 03:30)  Serum Pro-Brain Natriuretic Peptide: 2348 pg/mL (06-28 @ 17:55)        PHYSICAL EXAM:  T(C): 36.3 (06-29-18 @ 08:00), Max: 36.9 (06-29-18 @ 04:00)  HR: 97 (06-29-18 @ 11:00) (80 - 105)  BP: 116/74 (06-29-18 @ 10:00) (86/52 - 130/92)  RR: 16 (06-29-18 @ 11:00) (16 - 24)  SpO2: 96% (06-29-18 @ 09:00) (96% - 100%)  Wt(kg): --  I&O's Summary    28 Jun 2018 07:01 - 29 Jun 2018 07:00  --------------------------------------------------------  IN: 605 mL / OUT: 3150 mL / NET: -2545 mL    29 Jun 2018 07:01 - 29 Jun 2018 12:45  --------------------------------------------------------  IN: 372.5 mL / OUT: 1000 mL / NET: -627.5 mL      Height (cm): 170.18 (06-28 @ 22:00)  Weight (kg): 100.1 (06-28 @ 22:00)  BMI (kg/m2): 34.6 (06-28 @ 22:00)  BSA (m2): 2.11 (06-28 @ 22:00)    Gen: NAD  HEENT:  (-)icterus (-)pallor  CV: N S1 S2 1/6 ROSALINDA (+)2 Pulses B/l  Resp:  Clear to ausculatation B/L, normal effort  GI: (+) BS Soft, NT, ND  Lymph:  (+)Edema, (-)obvious lymphadenopathy  Skin: Warm to touch, Normal turgor  Psych: Appropriate mood and affect    TELEMETRY: 	  sinus VPACED  	      ASSESSMENT/PLAN: 	62y Female severe NICM (LVEF 10-15%) who had a [employer] Biv-ICD implanted at [facility]. A recent LHC at [facility] was also unremarkable. She now returns for with a severe decompensation of her NICM (JVP > 20, hepatic congestion, abdominal pain, LE edema and a near 20 lb weight gain).    - Responding to milrinone and lasix gtt  - keep net negative  - hold BB  - May be beta blocker at this point  intolerant given severe myopathy  - cont after load reeducers    Augusto Grimes MD, FACC

## 2018-06-29 NOTE — CONSULT NOTE ADULT - PROBLEM SELECTOR RECOMMENDATION 9
-Moderately volume overloaded.   -Continue w/ milrinone 0.25 mcg/kg/min.   -Continue Lasix gtt 10 mg/hr.  -Start Captopril 6.25 mg po q 8 hrs, uptitrate if SBP allows.

## 2018-06-29 NOTE — CONSULT NOTE ADULT - ATTENDING COMMENTS
Briefly, 62 year old F h/o HTN, HLD, DM II, thyroid cancer s/p thyroidectomy, hypocalcemia, HFrEF (LVEF 10%, LVIDd 6.6 cm; first diagnosed in 2013), s/p CRT-D placement in June 2017 with recent admission for cardiogenic shock requiring inotropes (5/23-6/1) who presents from primary cardiologist's (Dr. Robbins) for decompensated heart failure. In ER, was noted to be volume overloaded and in low output state so started on lasix gtt and milrinone 0.25 with which she has improved. On exam, JVD approx 12 cm, RRR, grade II/VI systolic murmur, S3, CTAB, nontender abdomen, trace edema. Labs reviewed - K 3.5, BUN/Cr 30/1.17, Ca 6, ionized calcium 0.79, BNP 2k. RHC on 5/25/18 which showed RA of 28, PCWP 37, PA 57/39/46 , EDP 23, PA sat 34.7%, CO 2.72, CI 1.21. TTE reviewed - EF 10%, LVEDD 6.6 cm, mod MR, severe TR. Overall, stage D HF, NYHA class IV with evidence of volume overload.  - continue milrinone 0.25 mcg/kg/min  - reduce lasix gtt to 10 mg/hr  - standing weights daily  - please c/s endocrine regarding hypocalcemia likely 2/2 prior thyroid/parathyroid resection; likely contributed by lasix gtt as well; replete calcium aggressively as per them  - keep K>4, Mg>2  - will need to have ongoing discussion regarding advanced therapies which pt has refused in the past Briefly, 62 year old F h/o HTN, HLD, DM II, thyroid cancer s/p thyroidectomy, hypocalcemia, HFrEF (LVEF 10%, LVIDd 6.6 cm; first diagnosed in 2013), s/p CRT-D placement in June 2017 with recent admission for cardiogenic shock requiring inotropes (5/23-6/1) who presents from primary cardiologist's (Dr. Robbins) for decompensated heart failure. In ER, was noted to be volume overloaded and in low output state so started on lasix gtt and milrinone 0.25 with which she has improved. On exam, JVD approx 12 cm, RRR, grade II/VI systolic murmur, S3, CTAB, nontender abdomen, trace edema. Labs reviewed - K 3.5, BUN/Cr 30/1.17, Ca 6, ionized calcium 0.79, BNP 2k. RHC on 5/25/18 which showed RA of 28, PCWP 37, PA 57/39/46 , EDP 23, PA sat 34.7%, CO 2.72, CI 1.21. TTE reviewed - EF 10%, LVEDD 6.6 cm, mod MR, severe TR. Overall, stage D HF, NYHA class IV with evidence of volume overload.  - continue milrinone 0.25 mcg/kg/min  - reduce lasix gtt to 10 mg/hr  - start captopril 6.25 mg q8h; titrate up as tolerated; if tolerates 6.25, increase to 12.5 mg q8h and so on; hold for SBP <90  - standing weights daily  - please c/s endocrine regarding hypocalcemia likely 2/2 prior thyroid/parathyroid resection; likely contributed by lasix gtt as well; replete calcium aggressively as per them  - keep K>4, Mg>2  - will need to have ongoing discussion regarding advanced therapies which pt has refused in the past

## 2018-06-29 NOTE — CONSULT NOTE ADULT - ASSESSMENT
This is a 62 year old woman with a past medical history of HTN, HLD, DM II, thyroid cancer s/p thyroidectomy, chronic systolic heart failure (LVEF 10%, LVIDd 6.6 cm), first diagnosed in 2013, CRT-ICD placement in June 2017. She had recent cardiac cath at [place] with Dr. Xander Chavarria and reports  normal coronaries. Pt was admitted to [facility] 5/23/18-6/1/18 for acute on chronic systolic HF, course in hospital complicated by cardiogenic shock requiring IV inotropic support and episodes of NSVT. RHC on 5/25/18 which showed RA of 28, PCWP 37, PA 57/39/46 , EDP 23, PA sat 34.7%, CO 2.72, CI 1.21. She was offered a transfer to [facility] for consideration in LVAD, but patient refused. [place] was kept in and meds adjusted and she was eventually weaned off inotrope.  Of note, she was seen in [facility] clinic on 6/18/18 and was in ADHF, was told she should be admitted for treatment, but patient refused admission. She has had multiple admissions for heart failure over the last year at various hospitals. This is a 62 year old woman with a past medical history of HTN, HLD, DM II, thyroid cancer, chronic systolic heart failure (LVEF 10%, LVIDd 6.6 cm), first diagnosed in 2013, CRT-ICD placement in June 2017. She had recent cardiac cath at [place] with Dr. Xander Chavarria and reports  normal coronaries. Pt was admitted to [facility] 5/23/18-6/1/18 for acute on chronic systolic HF, course in hospital complicated by cardiogenic shock requiring IV inotropic support and episodes of NSVT. RHC on 5/25/18 which showed RA of 28, PCWP 37, PA 57/39/46 , EDP 23, PA sat 34.7%, CO 2.72, CI 1.21. She was offered a transfer to [facility] for consideration in LVAD, but patient refused. [place] was kept in and meds adjusted and she was eventually weaned off inotrope.  Of note, she was seen in [facility] clinic on 6/18/18 and was in ADHF, was told she should be admitted for treatment, but patient refused admission. She has had multiple admissions for heart failure over the last year at various hospitals. She comes in w/ complaints of diarrhea, abdominal bloating and decrease urination s/p initiating Entresto.

## 2018-06-29 NOTE — PROGRESS NOTE ADULT - ASSESSMENT
62F with a past medical history of hypertension, hyperlipidemia, chronic RBBB,  obesity, thyroid CA s/p thyroidectomy ,chronic hypocalcemia  , nonischemic cardiomyopathy with an ejection fraction of 11% and normal coronaries on recent cardiac catheterization at [facility] with Dr Chavarria , s/p Medtronic Sofi MRI CRT-ICD placement in June 2017, HFrEF 11% , multiple admission  for decompensated HF presents  with acute on chronic decompensated HF requiring inotrope support diuresis 62F with a past medical history of hypertension, hyperlipidemia, chronic RBBB,  obesity, thyroid CA s/p thyroidectomy ,chronic hypocalcemia  , nonischemic cardiomyopathy with an ejection fraction of 11% and normal coronaries on recent cardiac catheterization at [facility] with Dr Chavarria , s/p Medtronic Sofi MRI CRT-ICD placement in June 2017, HFrEF 11% , multiple admission  for decompensated HF presents  with acute on chronic decompensated HF requiring inotrope support diuresis.

## 2018-06-29 NOTE — PROGRESS NOTE ADULT - PROBLEM SELECTOR PLAN 2
Has h/o diabetes and is on oral hypoglycemic agents   Hold oral hypoglycemic agents  Monitor blood sugars  Lispro insulin sliding scale  Low carbohydrate diet  c/w gabapentin 100mg q 8hr     Nutrition consult Has h/o diabetes and is on oral hypoglycemic agents   Hold oral hypoglycemic agents  Monitor blood sugars  Humalog insulin  Low carbohydrate diet  c/w gabapentin 100mg q 8hr     Nutrition consult - Hold oral hypoglycemic agents  - C/w Humalog and gabapentin  - Monitor blood sugars  - Low carbohydrate diet  - Nutrition consult

## 2018-06-29 NOTE — PROGRESS NOTE ADULT - PROBLEM SELECTOR PLAN 1
received lasix 80mg IVP x1 in ED  started on lasix at 10mg /hr and milrinone drip at 0.25mcg    Daily weight monitoring, Strict I/O, Low sodium DASH diet  AICD interrogation showed NSVT, no shock   monitor BMP and replete electrolytes as needed  hold Metroprolol , valsartan and torsemide  -CHF consult for advanced therapies (VAD vs transplant) received lasix 80mg IVP x1 in ED  started on lasix at 10mg /hr and milrinone drip at 0.25mcg    Daily standing weight monitoring, Strict I/O, Low sodium DASH diet  AICD interrogation showed NSVT, no shock   monitor BMP and replete electrolytes as needed  - Started on Captopril 6.25 mg PO TID on 6/29.  hold Metroprolol , valsartan and torsemide  - CHF consult for advanced therapies (VAD vs transplant) - Presented with severe HF sxs (worsening EDDY, abdominal pain/bloating, diarrhea, decreased UOP, JVP > 20, hepatic congestion, b/l LE edema), which have now all resolved.  received lasix 80mg IVP x1 in ED  C/w lasix at 10mg /hr and milrinone drip at 0.25mcg    Daily standing weight monitoring, Strict I/O, Low sodium DASH diet  AICD interrogation showed NSVT, no shock   monitor BMP and replete electrolytes as needed  - Started on Captopril 6.25 mg PO TID on 6/29.  - C/w ASA  hold Metroprolol , valsartan and torsemide  - Repeat echo  - CHF consult for advanced therapies (VAD vs transplant) - Presented with severe HF sxs (worsening EDDY, abdominal pain/bloating, diarrhea, decreased UOP, JVP > 20, hepatic congestion, b/l LE edema), which have now all resolved.  - AICD interrogation showed NSVT, no shock  - TTE performed 6/29 due to the pericardial effusion seen on CXR. Awaiting official results.  - Received Lasix 80mg IVP x1 in ED. C/w Lasix at 10mg/hr and milrinone drip at 0.25mcg.  - C/w ASA  - Started on Captopril 6.25 mg PO TID on 6/29. Increase as tolerated.  - Hold Metroprolol, Valsartan and Torsemide  - Daily standing weight monitoring, Strict I/O, Low sodium DASH diet  - Monitor BMP and electrolytes as needed.  - CHF consult for advanced therapies (VAD vs transplant)

## 2018-06-30 ENCOUNTER — TRANSCRIPTION ENCOUNTER (OUTPATIENT)
Age: 63
End: 2018-06-30

## 2018-06-30 LAB
24R-OH-CALCIDIOL SERPL-MCNC: 50.7 NG/ML — SIGNIFICANT CHANGE UP (ref 30–80)
ALBUMIN SERPL ELPH-MCNC: 3.5 G/DL — SIGNIFICANT CHANGE UP (ref 3.3–5)
ALBUMIN SERPL ELPH-MCNC: 3.7 G/DL — SIGNIFICANT CHANGE UP (ref 3.3–5)
ALP SERPL-CCNC: 569 U/L — HIGH (ref 40–120)
ALP SERPL-CCNC: 606 U/L — HIGH (ref 40–120)
ALT FLD-CCNC: 76 U/L — HIGH (ref 4–33)
ALT FLD-CCNC: 80 U/L — HIGH (ref 4–33)
AST SERPL-CCNC: 102 U/L — HIGH (ref 4–32)
AST SERPL-CCNC: 104 U/L — HIGH (ref 4–32)
BILIRUB SERPL-MCNC: 3.9 MG/DL — HIGH (ref 0.2–1.2)
BILIRUB SERPL-MCNC: 3.9 MG/DL — HIGH (ref 0.2–1.2)
BUN SERPL-MCNC: 19 MG/DL — SIGNIFICANT CHANGE UP (ref 7–23)
BUN SERPL-MCNC: 24 MG/DL — HIGH (ref 7–23)
CA-I BLD-SCNC: 0.77 MMOL/L — CRITICAL LOW (ref 1.03–1.23)
CA-I BLD-SCNC: 0.88 MMOL/L — LOW (ref 1.03–1.23)
CALCIUM SERPL-MCNC: 7 MG/DL — LOW (ref 8.4–10.5)
CALCIUM SERPL-MCNC: 7 MG/DL — LOW (ref 8.4–10.5)
CHLORIDE SERPL-SCNC: 89 MMOL/L — LOW (ref 98–107)
CHLORIDE SERPL-SCNC: 91 MMOL/L — LOW (ref 98–107)
CO2 SERPL-SCNC: 28 MMOL/L — SIGNIFICANT CHANGE UP (ref 22–31)
CO2 SERPL-SCNC: 29 MMOL/L — SIGNIFICANT CHANGE UP (ref 22–31)
CREAT SERPL-MCNC: 0.98 MG/DL — SIGNIFICANT CHANGE UP (ref 0.5–1.3)
CREAT SERPL-MCNC: 0.99 MG/DL — SIGNIFICANT CHANGE UP (ref 0.5–1.3)
GLUCOSE BLDC GLUCOMTR-MCNC: 105 MG/DL — HIGH (ref 70–99)
GLUCOSE BLDC GLUCOMTR-MCNC: 137 MG/DL — HIGH (ref 70–99)
GLUCOSE BLDC GLUCOMTR-MCNC: 190 MG/DL — HIGH (ref 70–99)
GLUCOSE BLDC GLUCOMTR-MCNC: 246 MG/DL — HIGH (ref 70–99)
GLUCOSE SERPL-MCNC: 114 MG/DL — HIGH (ref 70–99)
GLUCOSE SERPL-MCNC: 151 MG/DL — HIGH (ref 70–99)
HCT VFR BLD CALC: 35.1 % — SIGNIFICANT CHANGE UP (ref 34.5–45)
HGB BLD-MCNC: 11.6 G/DL — SIGNIFICANT CHANGE UP (ref 11.5–15.5)
MAGNESIUM SERPL-MCNC: 1.7 MG/DL — SIGNIFICANT CHANGE UP (ref 1.6–2.6)
MAGNESIUM SERPL-MCNC: 2.3 MG/DL — SIGNIFICANT CHANGE UP (ref 1.6–2.6)
MCHC RBC-ENTMCNC: 24.1 PG — LOW (ref 27–34)
MCHC RBC-ENTMCNC: 33 % — SIGNIFICANT CHANGE UP (ref 32–36)
MCV RBC AUTO: 73 FL — LOW (ref 80–100)
NRBC # FLD: 0 — SIGNIFICANT CHANGE UP
PHOSPHATE SERPL-MCNC: 4.6 MG/DL — HIGH (ref 2.5–4.5)
PHOSPHATE SERPL-MCNC: 5.1 MG/DL — HIGH (ref 2.5–4.5)
PLATELET # BLD AUTO: 278 K/UL — SIGNIFICANT CHANGE UP (ref 150–400)
PMV BLD: 10.3 FL — SIGNIFICANT CHANGE UP (ref 7–13)
POTASSIUM SERPL-MCNC: 3.3 MMOL/L — LOW (ref 3.5–5.3)
POTASSIUM SERPL-MCNC: 3.6 MMOL/L — SIGNIFICANT CHANGE UP (ref 3.5–5.3)
POTASSIUM SERPL-SCNC: 3.3 MMOL/L — LOW (ref 3.5–5.3)
POTASSIUM SERPL-SCNC: 3.6 MMOL/L — SIGNIFICANT CHANGE UP (ref 3.5–5.3)
PROT SERPL-MCNC: 7.5 G/DL — SIGNIFICANT CHANGE UP (ref 6–8.3)
PROT SERPL-MCNC: 8.2 G/DL — SIGNIFICANT CHANGE UP (ref 6–8.3)
RBC # BLD: 4.81 M/UL — SIGNIFICANT CHANGE UP (ref 3.8–5.2)
RBC # FLD: 24.4 % — HIGH (ref 10.3–14.5)
SODIUM SERPL-SCNC: 135 MMOL/L — SIGNIFICANT CHANGE UP (ref 135–145)
SODIUM SERPL-SCNC: 135 MMOL/L — SIGNIFICANT CHANGE UP (ref 135–145)
VIT D25+D1,25 OH+D1,25 PNL SERPL-MCNC: 55.9 PG/ML — SIGNIFICANT CHANGE UP (ref 19.9–79.3)
WBC # BLD: 5.71 K/UL — SIGNIFICANT CHANGE UP (ref 3.8–10.5)
WBC # FLD AUTO: 5.71 K/UL — SIGNIFICANT CHANGE UP (ref 3.8–10.5)

## 2018-06-30 RX ORDER — POTASSIUM CHLORIDE 20 MEQ
40 PACKET (EA) ORAL EVERY 4 HOURS
Qty: 0 | Refills: 0 | Status: COMPLETED | OUTPATIENT
Start: 2018-06-30 | End: 2018-06-30

## 2018-06-30 RX ORDER — MAGNESIUM SULFATE 500 MG/ML
2 VIAL (ML) INJECTION ONCE
Qty: 0 | Refills: 0 | Status: COMPLETED | OUTPATIENT
Start: 2018-06-30 | End: 2018-06-30

## 2018-06-30 RX ORDER — CALCIUM GLUCONATE 100 MG/ML
1 VIAL (ML) INTRAVENOUS ONCE
Qty: 0 | Refills: 0 | Status: COMPLETED | OUTPATIENT
Start: 2018-06-30 | End: 2018-06-30

## 2018-06-30 RX ORDER — CALCIUM GLUCONATE 100 MG/ML
2 VIAL (ML) INTRAVENOUS ONCE
Qty: 0 | Refills: 0 | Status: COMPLETED | OUTPATIENT
Start: 2018-06-30 | End: 2018-06-30

## 2018-06-30 RX ADMIN — Medication 667 MILLIGRAM(S): at 09:04

## 2018-06-30 RX ADMIN — CAPTOPRIL 6.25 MILLIGRAM(S): 12.5 TABLET ORAL at 22:00

## 2018-06-30 RX ADMIN — Medication 667 MILLIGRAM(S): at 17:43

## 2018-06-30 RX ADMIN — MAGNESIUM OXIDE 400 MG ORAL TABLET 400 MILLIGRAM(S): 241.3 TABLET ORAL at 17:43

## 2018-06-30 RX ADMIN — Medication 81 MILLIGRAM(S): at 12:35

## 2018-06-30 RX ADMIN — Medication 1 TABLET(S): at 22:00

## 2018-06-30 RX ADMIN — Medication 1 TABLET(S): at 13:55

## 2018-06-30 RX ADMIN — Medication 200 GRAM(S): at 10:19

## 2018-06-30 RX ADMIN — Medication 40 MILLIEQUIVALENT(S): at 12:40

## 2018-06-30 RX ADMIN — Medication 1000 UNIT(S): at 12:39

## 2018-06-30 RX ADMIN — Medication 175 MICROGRAM(S): at 05:27

## 2018-06-30 RX ADMIN — Medication 200 GRAM(S): at 15:48

## 2018-06-30 RX ADMIN — MILRINONE LACTATE 7.5 MICROGRAM(S)/KG/MIN: 1 INJECTION, SOLUTION INTRAVENOUS at 07:05

## 2018-06-30 RX ADMIN — GABAPENTIN 100 MILLIGRAM(S): 400 CAPSULE ORAL at 21:59

## 2018-06-30 RX ADMIN — CHLORHEXIDINE GLUCONATE 1 APPLICATION(S): 213 SOLUTION TOPICAL at 09:03

## 2018-06-30 RX ADMIN — HEPARIN SODIUM 5000 UNIT(S): 5000 INJECTION INTRAVENOUS; SUBCUTANEOUS at 21:59

## 2018-06-30 RX ADMIN — Medication 5 MG/HR: at 07:06

## 2018-06-30 RX ADMIN — Medication 50 GRAM(S): at 09:06

## 2018-06-30 RX ADMIN — FAMOTIDINE 20 MILLIGRAM(S): 10 INJECTION INTRAVENOUS at 17:43

## 2018-06-30 RX ADMIN — CALCITRIOL 1 MICROGRAM(S): 0.5 CAPSULE ORAL at 12:34

## 2018-06-30 RX ADMIN — FAMOTIDINE 20 MILLIGRAM(S): 10 INJECTION INTRAVENOUS at 05:27

## 2018-06-30 RX ADMIN — Medication 667 MILLIGRAM(S): at 12:35

## 2018-06-30 RX ADMIN — Medication 40 MILLIEQUIVALENT(S): at 09:04

## 2018-06-30 RX ADMIN — MAGNESIUM OXIDE 400 MG ORAL TABLET 400 MILLIGRAM(S): 241.3 TABLET ORAL at 09:04

## 2018-06-30 RX ADMIN — GABAPENTIN 100 MILLIGRAM(S): 400 CAPSULE ORAL at 13:55

## 2018-06-30 RX ADMIN — GABAPENTIN 100 MILLIGRAM(S): 400 CAPSULE ORAL at 05:27

## 2018-06-30 RX ADMIN — Medication 2: at 12:40

## 2018-06-30 RX ADMIN — CAPTOPRIL 6.25 MILLIGRAM(S): 12.5 TABLET ORAL at 05:27

## 2018-06-30 RX ADMIN — HEPARIN SODIUM 5000 UNIT(S): 5000 INJECTION INTRAVENOUS; SUBCUTANEOUS at 05:27

## 2018-06-30 RX ADMIN — Medication 1 TABLET(S): at 05:27

## 2018-06-30 NOTE — DISCHARGE NOTE ADULT - HOSPITAL COURSE
This is a 62 year old woman with a past medical history of HTN, HLD, DM II, thyroid cancer, chronic systolic heart failure (LVEF 10%, LVIDd 6.6 cm), first diagnosed in 2013, CRT-ICD placement in June 2017. She had recent cardiac cath at [place] with Dr. Xander Chavarria and reports  normal coronaries. Pt was admitted to [facility] 5/23/18-6/1/18 for acute on chronic systolic HF, course in hospital complicated by cardiogenic shock requiring IV inotropic support and episodes of NSVT. RHC on 5/25/18 which showed RA of 28, PCWP 37, PA 57/39/46 , EDP 23, PA sat 34.7%, CO 2.72, CI 1.21. She was offered a transfer to [facility] for consideration in LVAD, but patient refused. [place] was kept in and meds adjusted and she was eventually weaned off inotrope.  Of note, she was seen in [facility] clinic on 6/18/18 and was in ADHF, was told she should be admitted for treatment, but patient refused admission. She has had multiple admissions for heart failure over the last year at various hospitals. She  complaints of diarrhea, abdominal bloating and decrease urination s/p initiating Entresto.She presents from primary cardiologist's (Dr. Robibns) for decompensated heart failure .In  ER, was noted to be volume overloaded and in low output state so started on lasix gtt and milrinone 0.25 with which she has improved.transfer to CCU This is a 62 year old woman with a past medical history of HTN, HLD, DM II, thyroid cancer, chronic systolic heart failure (LVEF 10%, LVIDd 6.6 cm), first diagnosed in 2013, CRT-ICD placement in June 2017. She had recent cardiac cath at [place] with Dr. Xander Chavarria and reports  normal coronaries. Pt was admitted to [facility] 5/23/18-6/1/18 for acute on chronic systolic HF, course in hospital complicated by cardiogenic shock requiring IV inotropic support and episodes of NSVT. RHC on 5/25/18 which showed RA of 28, PCWP 37, PA 57/39/46 , EDP 23, PA sat 34.7%, CO 2.72, CI 1.21. She was offered a transfer to [facility] for consideration in LVAD, but patient refused. [place] was kept in and meds adjusted and she was eventually weaned off inotrope.  Of note, she was seen in [facility] clinic on 6/18/18 and was in ADHF, was told she should be admitted for treatment, but patient refused admission. She has had multiple admissions for heart failure over the last year at various hospitals. She complaints of diarrhea, abdominal bloating and decrease urination s/p initiating Entresto. She presents from primary cardiologist's (Dr. Robbins) for decompensated heart failure . In  ER, was noted to be volume overloaded and in low output state so started on lasix 80 mg IVP and lasix gtt and milrinone 0.25 for aggressive diuresis, which she has improved. Transfer to CCU.    AICD interrogation showed NSVT, no shock. CXR showed pericardial effusion, and TTE on 6/29 showed no significant changes. Started on captopril 6.25 mg PO TID on 6/29 and hold metoprolol, valsartan, torsemide. Milrinone discontinued, and lasix switched to 60 IV BID. Patient improved clinically with cough and diarrhea resolved. She wants to get discharged today to attend a wedding. She has a history of  noncompliance with meds and low-sodium diet, so they need to be re-emphasized to her. HF team cleared her with the following outpatient medication regimens and will follow up on her.    Change lasix to Torsemide 40 BID  Valsartan 20 BID -> discontinue on Wednesday 7/4 and start Entresto  Metoprolol 25 daily  Potassium 20 daily  Aldactone (spironolactone) 25 daily  Discontinue captopril This is a 62 year old woman with a past medical history of HTN, HLD, DM II, thyroid cancer, chronic systolic heart failure (LVEF 10%, LVIDd 6.6 cm), first diagnosed in 2013, CRT-ICD placement in June 2017. She had recent cardiac cath at [place] with Dr. Xander Chavarria and reports  normal coronaries. Pt was admitted to [facility] 5/23/18-6/1/18 for acute on chronic systolic HF, course in hospital complicated by cardiogenic shock requiring IV inotropic support and episodes of NSVT. RHC on 5/25/18 which showed RA of 28, PCWP 37, PA 57/39/46 , EDP 23, PA sat 34.7%, CO 2.72, CI 1.21. She was offered a transfer to [facility] for consideration in LVAD, but patient refused. [place] was kept in and meds adjusted and she was eventually weaned off inotrope.  Of note, she was seen in [facility] clinic on 6/18/18 and was in ADHF, was told she should be admitted for treatment, but patient refused admission. She has had multiple admissions for heart failure over the last year at various hospitals. She complaints of diarrhea, abdominal bloating and decrease urination s/p initiating Entresto. She presents from primary cardiologist's (Dr. Robbins) for decompensated heart failure . In  ER, was noted to be volume overloaded and in low output state so started on lasix 80 mg IVP and lasix gtt and milrinone 0.25 for aggressive diuresis, which she has improved. Transfer to CCU.    AICD interrogation showed NSVT, no shock. CXR showed pericardial effusion, and TTE on 6/29 showed no significant changes. Started on captopril 6.25 mg PO TID on 6/29 and hold metoprolol, valsartan, torsemide. Milrinone discontinued, and lasix switched to 60 IV BID. Patient improved clinically with cough and diarrhea resolved. She wants to get discharged today to attend a wedding. She has a history of  noncompliance with meds and low-sodium diet, so they need to be re-emphasized to her. HF team cleared her with the following outpatient medication regimens and will follow up on her.    Change lasix to Torsemide 40 BID  Valsartan 20 BID until Wednesday 7/4, and then discontinue valsartan and start Entresto 24-26 on Thursday 7/5  Metoprolol 25 daily  Potassium 20 daily  Aldactone (spironolactone) 25 daily  Discontinue captopril This is a 62 year old woman with a past medical history of HTN, HLD, DM II, thyroid cancer, chronic systolic heart failure (LVEF 10%, LVIDd 6.6 cm), first diagnosed in 2013, CRT-ICD placement in June 2017. She had recent cardiac cath at [place] with Dr. Xander Chavarria and reports  normal coronaries. Pt was admitted to [facility] 5/23/18-6/1/18 for acute on chronic systolic HF, course in hospital complicated by cardiogenic shock requiring IV inotropic support and episodes of NSVT. RHC on 5/25/18 which showed RA of 28, PCWP 37, PA 57/39/46 , EDP 23, PA sat 34.7%, CO 2.72, CI 1.21. She was offered a transfer to [facility] for consideration in LVAD, but patient refused. [place] was kept in and meds adjusted and she was eventually weaned off inotrope.  Of note, she was seen in [facility] clinic on 6/18/18 and was in ADHF, was told she should be admitted for treatment, but patient refused admission. She has had multiple admissions for heart failure over the last year at various hospitals. She complaints of diarrhea, abdominal bloating and decrease urination s/p initiating Entresto. She presents from primary cardiologist's (Dr. Robbins) for decompensated heart failure . In  ER, was noted to be volume overloaded and in low output state so started on lasix 80 mg IVP and lasix gtt and milrinone 0.25 for aggressive diuresis, which she has improved. Transfer to CCU.    AICD interrogation showed NSVT, no shock. CXR showed pericardial effusion, and TTE on 6/29 showed no significant changes. Started on captopril 6.25 mg PO TID on 6/29 and hold metoprolol, valsartan, torsemide. Milrinone discontinued, and lasix switched to 60 IV BID. Patient improved clinically with cough and diarrhea resolved. She wants to get discharged today to attend a wedding. She has a history of  noncompliance with meds and low-sodium diet, so they need to be re-emphasized to her. HF team cleared her with the following outpatient medication regimens and will follow up on her.    Change lasix to Torsemide 40 BID  Valsartan 20 BID until Wednesday 7/4, and then discontinue valsartan and start Entresto 24-26 on Thursday 7/5  Metoprolol 25 daily  Potassium 20 daily  Aldactone (spironolactone) 25 daily  Discontinue captopril      Follow up with Dr. Evelyn Robbins for general cardiology and follow up with Dr. Santana for CHF for possible VAD or transplant as outpatient.

## 2018-06-30 NOTE — PROGRESS NOTE ADULT - PROBLEM SELECTOR PLAN 2
- Hold oral hypoglycemic agents  - C/w Humalog and gabapentin  - Monitor blood sugars  - Low carbohydrate diet  - Nutrition consult

## 2018-06-30 NOTE — DISCHARGE NOTE ADULT - PATIENT PORTAL LINK FT
You can access the [facility] Patient Portal, offered by [facility], by registering with the following website: [email or web address]

## 2018-06-30 NOTE — DISCHARGE NOTE ADULT - MEDICATION SUMMARY - MEDICATIONS TO CHANGE
I will SWITCH the dose or number of times a day I take the medications listed below when I get home from the hospital:    aspirin 81 mg oral delayed release tablet  -- 1 tab(s) by mouth once a day    Tradjenta 5 mg oral tablet  -- 1 tab(s) by mouth once a day    magnesium oxide 400 mg (241.3 mg elemental magnesium) oral tablet  -- 1 tab(s) by mouth 3 times a day (with meals)    famotidine 20 mg oral tablet  -- 1 tab(s) by mouth 2 times a day    gabapentin 100 mg oral capsule  -- 1 cap(s) by mouth every 8 hours    metoprolol succinate 25 mg oral tablet, extended release  -- 1 tab(s) by mouth once a day    levothyroxine 175 mcg (0.175 mg) oral tablet  -- 1 tab(s) by mouth once a day    calcium-vitamin D 500 mg-200 intl units oral tablet  -- 1 tab(s) by mouth 3 times a day    calcitriol 0.5 mcg oral capsule  -- 1 cap(s) by mouth once a day (at bedtime)    cholecalciferol oral tablet  -- 1 tab(s) by mouth once a day , 1000 units    calcium acetate 667 mg oral tablet  --  by mouth I will SWITCH the dose or number of times a day I take the medications listed below when I get home from the hospital:    Demadex  -- 80 milligram(s) by mouth 2 times a day    potassium chloride  -- 40 milliequivalent(s) by mouth once a day    calcium acetate 667 mg oral tablet  --  by mouth

## 2018-06-30 NOTE — PROGRESS NOTE ADULT - SUBJECTIVE AND OBJECTIVE BOX
EKG: NSR, Biv pacing, no further VT    less dyspnea, no angina, no syncope      MEDICATIONS  (STANDING):  aspirin enteric coated 81 milliGRAM(s) Oral daily  calcitriol   Capsule 1 MICROGram(s) Oral daily  calcium acetate 667 milliGRAM(s) Oral three times a day with meals  calcium carbonate 1250 mG + Vitamin D (OsCal 500 + D) 1 Tablet(s) Oral three times a day  captopril 6.25 milliGRAM(s) Oral three times a day  chlorhexidine 4% Liquid 1 Application(s) Topical <User Schedule>  cholecalciferol 1000 Unit(s) Oral daily  dextrose 5%. 1000 milliLiter(s) (50 mL/Hr) IV Continuous <Continuous>  dextrose 50% Injectable 12.5 Gram(s) IV Push once  dextrose 50% Injectable 25 Gram(s) IV Push once  dextrose 50% Injectable 25 Gram(s) IV Push once  famotidine    Tablet 20 milliGRAM(s) Oral two times a day  furosemide Infusion 10 mG/Hr (5 mL/Hr) IV Continuous <Continuous>  gabapentin 100 milliGRAM(s) Oral every 8 hours  heparin  Injectable 5000 Unit(s) SubCutaneous every 8 hours  insulin lispro (HumaLOG) corrective regimen sliding scale   SubCutaneous three times a day before meals  insulin lispro (HumaLOG) corrective regimen sliding scale   SubCutaneous at bedtime  levothyroxine 175 MICROGram(s) Oral daily  magnesium oxide 400 milliGRAM(s) Oral two times a day with meals  milrinone Infusion 0.25 MICROgram(s)/kG/Min (7.5 mL/Hr) IV Continuous <Continuous>    MEDICATIONS  (PRN):  dextrose 40% Gel 15 Gram(s) Oral once PRN Blood Glucose LESS THAN 70 milliGRAM(s)/deciliter  glucagon  Injectable 1 milliGRAM(s) IntraMuscular once PRN Glucose LESS THAN 70 milligrams/deciliter      LABS:                        11.6   5.71  )-----------( 278      ( 30 Jun 2018 05:30 )             35.1     Hemoglobin: 11.6 g/dL (06-30 @ 05:30)  Hemoglobin: 10.7 g/dL (06-29 @ 03:30)  Hemoglobin: 11.7 g/dL (06-28 @ 17:55)    06-30    135  |  91<L>  |  24<H>  ----------------------------<  114<H>  3.3<L>   |  29  |  0.98    Ca    7.0<L>      30 Jun 2018 05:30  Phos  5.1     06-30  Mg     1.7     06-30    TPro  7.5  /  Alb  3.5  /  TBili  3.9<H>  /  DBili  x   /  AST  104<H>  /  ALT  76<H>  /  AlkPhos  569<H>  06-30    Creatinine Trend: 0.98<--, 1.15<--, 1.19<--, 1.30<--, 0.91<--           PHYSICAL EXAM  Vital Signs Last 24 Hrs  T(C): 36.5 (30 Jun 2018 12:30), Max: 36.8 (30 Jun 2018 04:00)  T(F): 97.7 (30 Jun 2018 12:30), Max: 98.2 (30 Jun 2018 04:00)  HR: 88 (30 Jun 2018 12:30) (85 - 99)  BP: 105/66 (30 Jun 2018 12:30) (89/56 - 132/83)  BP(mean): 76 (30 Jun 2018 12:30) (64 - 96)  RR: 28 (30 Jun 2018 12:30) (12 - 28)  SpO2: 100% (30 Jun 2018 12:30) (96% - 100%)    RRR, no murmurs  CTAB  soft nt/nd  +2 edema B/L LE's     repeat echo      EF 10 %   < from: TTE with Doppler (w/Cont) (06.29.18 @ 10:45) >  CONCLUSIONS:  1. Mitral annular calcification, otherwise normal mitral  valve. Moderate mitral regurgitation.  2. Moderately dilated left atrium.  LA volume index = 42  cc/m2.  3. Moderate left ventricular enlargement.  4. Severe global left ventricular systolic dysfunction.  Endocardial visualization enhanced with intravenous  injection of echo contrast (Definity).  No LV thrombus  seen.  5. Moderate right atrial enlargement.  6. Right ventricular enlargement with decreased right  ventricular systolic function.  A device wire is noted in  the right heart.  7. Normal tricuspid valve.  Severe tricuspid regurgitation.  *** Compared with echocardiogram of 4/26/2018,no  significant changes noted.    < end of copied text >      A/P) She is a pleasant 63 y/o female PMH severe NICM (LVEF 10-15%) who had a Medtronic Biv-ICD implanted at [place]. A recent LHC at [place] was also unremarkable. She now returns for with a severe decompensation of her NICM (JVP > 20, hepatic congestion, abdominal pain, LE edema and a near 20 lb weight gain). Her device interrogation demonstrates excellent RA, RV and LV lead functions, but with frequent episodes of NSVT. She denies palpitations nor high voltage therapy.     -continue lasix and milrinone drips   -CHF consult for advanced therapies (VAD vs transplant)   -no need for AAD at this time as no HV therapy  -no need to recheck ICD  -repeat echo as noted above   -she remains critically ill and will likely require long term mechanical support (LVAD vs transplant)  - F/U HF recommendations

## 2018-06-30 NOTE — PROGRESS NOTE ADULT - SUBJECTIVE AND OBJECTIVE BOX
Subjective:  Patient seen  in CCU this morning sitting upright eating and doing well. Denies complaints and states her breathing had greatly improve as has her LE edema. Denies chest pain/pleuritc chest pain, dizziness or lightheadedness.      MEDICATIONS  (STANDING):  aspirin enteric coated 81 milliGRAM(s) Oral daily  calcitriol   Capsule 1 MICROGram(s) Oral daily  calcium acetate 667 milliGRAM(s) Oral three times a day with meals  calcium carbonate 1250 mG + Vitamin D (OsCal 500 + D) 1 Tablet(s) Oral three times a day  calcium gluconate IVPB 1 Gram(s) IV Intermittent once  captopril 6.25 milliGRAM(s) Oral three times a day  chlorhexidine 4% Liquid 1 Application(s) Topical <User Schedule>  cholecalciferol 1000 Unit(s) Oral daily  dextrose 5%. 1000 milliLiter(s) (50 mL/Hr) IV Continuous <Continuous>  dextrose 50% Injectable 12.5 Gram(s) IV Push once  dextrose 50% Injectable 25 Gram(s) IV Push once  dextrose 50% Injectable 25 Gram(s) IV Push once  famotidine    Tablet 20 milliGRAM(s) Oral two times a day  furosemide Infusion 10 mG/Hr (5 mL/Hr) IV Continuous <Continuous>  gabapentin 100 milliGRAM(s) Oral every 8 hours  heparin  Injectable 5000 Unit(s) SubCutaneous every 8 hours  insulin lispro (HumaLOG) corrective regimen sliding scale   SubCutaneous three times a day before meals  insulin lispro (HumaLOG) corrective regimen sliding scale   SubCutaneous at bedtime  levothyroxine 175 MICROGram(s) Oral daily  magnesium oxide 400 milliGRAM(s) Oral two times a day with meals  milrinone Infusion 0.25 MICROgram(s)/kG/Min (7.5 mL/Hr) IV Continuous <Continuous>  potassium chloride    Tablet ER 40 milliEquivalent(s) Oral every 4 hours    MEDICATIONS  (PRN):  dextrose 40% Gel 15 Gram(s) Oral once PRN Blood Glucose LESS THAN 70 milliGRAM(s)/deciliter  glucagon  Injectable 1 milliGRAM(s) IntraMuscular once PRN Glucose LESS THAN 70 milligrams/deciliter      LABS:                        11.6   5.71  )-----------( 278      ( 30 Jun 2018 05:30 )             35.1     Hemoglobin: 11.6 g/dL (06-30 @ 05:30)  Hemoglobin: 10.7 g/dL (06-29 @ 03:30)  Hemoglobin: 11.7 g/dL (06-28 @ 17:55)    06-30    135  |  91<L>  |  24<H>  ----------------------------<  114<H>  3.3<L>   |  29  |  0.98    Ca    7.0<L>      30 Jun 2018 05:30  Phos  5.1     06-30  Mg     1.7     06-30    TPro  7.5  /  Alb  3.5  /  TBili  3.9<H>  /  DBili  x   /  AST  104<H>  /  ALT  76<H>  /  AlkPhos  569<H>  06-30    Creatinine Trend: 0.98<--, 1.15<--, 1.19<--, 1.30<--, 0.91<--         PHYSICAL EXAM  Vital Signs Last 24 Hrs  T(C): 36.5 (30 Jun 2018 09:00), Max: 36.8 (30 Jun 2018 04:00)  T(F): 97.7 (30 Jun 2018 09:00), Max: 98.2 (30 Jun 2018 04:00)  HR: 94 (30 Jun 2018 10:00) (85 - 99)  BP: 89/56 (30 Jun 2018 10:00) (89/56 - 132/83)  BP(mean): 64 (30 Jun 2018 10:00) (64 - 96)  RR: 25 (30 Jun 2018 10:00) (12 - 27)  SpO2: 97% (30 Jun 2018 09:00) (96% - 98%)      HEENT:   Normal oral mucosa, PERRL, EOMI	  Lymphatic: No obvious lymphadenopathy , no edema  Cardiovascular: Normal S1 S2, No JVD, 1/6 ROSALINDA murmur, Peripheral pulses palpable 2+ bilaterally  Respiratory: Lungs clear to auscultation, normal effort 	  Gastrointestinal:  Soft, Non-tender, + BS	  Skin: No rashes,  No cyanosis, warm to touch  Musculoskeletal: Normal range of motion, normal strength  Psychiatry:  Appropriate Mood & affect   ext: 3+ edema b/l    TELEMETRY: 	  NSR	      ASSESSMENT/PLAN: 	62y Female with pmhx of NICM/DM/ medically non-compliant admitted with acute on chronic systolic heart failure.    -acute on chronic CHF secondary to medication non-complaince as well as dietary   -recent cardiac cath at [facility] revealed nonobstructive CAD.  No need for ischemic evaluation at this time.  -continue IV lasix/milrinone at this time as patient is improving and tolerating well.  -continue discussion of transplant vs. LVAD        Adele Miller PA-C

## 2018-06-30 NOTE — PROGRESS NOTE ADULT - SUBJECTIVE AND OBJECTIVE BOX
Date of Admission:  6/28/18  24 hour events:  no events overnight  Vital Signs Last 24 Hrs  T(C): 36.8 (30 Jun 2018 04:00), Max: 36.8 (30 Jun 2018 04:00)  T(F): 98.2 (30 Jun 2018 04:00), Max: 98.2 (30 Jun 2018 04:00)  HR: 89 (30 Jun 2018 09:00) (85 - 99)  BP: 101/56 (30 Jun 2018 09:00) (92/58 - 132/83)  BP(mean): 65 (30 Jun 2018 09:00) (65 - 96)  RR: 27 (30 Jun 2018 09:00) (12 - 27)  SpO2: 97% (30 Jun 2018 09:00) (96% - 98%)  I&O's Summary    29 Jun 2018 07:01  -  30 Jun 2018 07:00  --------------------------------------------------------  IN: 1070 mL / OUT: 4050 mL / NET: -2980 mL        MEDICATIONS:  aspirin enteric coated 81 milliGRAM(s) Oral daily  captopril 6.25 milliGRAM(s) Oral three times a day  furosemide Infusion 10 mG/Hr IV Continuous <Continuous>  heparin  Injectable 5000 Unit(s) SubCutaneous every 8 hours  milrinone Infusion 0.25 MICROgram(s)/kG/Min IV Continuous <Continuous>  gabapentin 100 milliGRAM(s) Oral every 8 hours  famotidine    Tablet 20 milliGRAM(s) Oral two times a day  dextrose 40% Gel 15 Gram(s) Oral once PRN  dextrose 50% Injectable 12.5 Gram(s) IV Push once  dextrose 50% Injectable 25 Gram(s) IV Push once  dextrose 50% Injectable 25 Gram(s) IV Push once  glucagon  Injectable 1 milliGRAM(s) IntraMuscular once PRN  insulin lispro (HumaLOG) corrective regimen sliding scale   SubCutaneous three times a day before meals  insulin lispro (HumaLOG) corrective regimen sliding scale   SubCutaneous at bedtime  levothyroxine 175 MICROGram(s) Oral daily    calcitriol   Capsule 1 MICROGram(s) Oral daily  calcium acetate 667 milliGRAM(s) Oral three times a day with meals  calcium carbonate 1250 mG + Vitamin D (OsCal 500 + D) 1 Tablet(s) Oral three times a day  chlorhexidine 4% Liquid 1 Application(s) Topical <User Schedule>  cholecalciferol 1000 Unit(s) Oral daily  dextrose 5%. 1000 milliLiter(s) IV Continuous <Continuous>  magnesium oxide 400 milliGRAM(s) Oral two times a day with meals  magnesium sulfate  IVPB 2 Gram(s) IV Intermittent once  potassium chloride    Tablet ER 40 milliEquivalent(s) Oral every 4 hours      REVIEW OF SYSTEMS:  Complete 10point ROS negative.    PHYSICAL EXAM:  General: NAD  Cardiovascular: Normal S1 S2, No JVD, No murmurs, No edema  Respiratory: Lungs clear to auscultation	  Gastrointestinal:  Soft, Non-tender, + BS	  Skin: warm and dry, No rashes, No ecchymoses, No cyanosis	  Extremities: Normal range of motion, No clubbing, cyanosis or edema  Vascular: Peripheral pulses palpable 2+ bilaterally    LABS:	 	    CBC Full  -  ( 30 Jun 2018 05:30 )  WBC Count : 5.71 K/uL  Hemoglobin : 11.6 g/dL  Hematocrit : 35.1 %  Platelet Count - Automated : 278 K/uL  Mean Cell Volume : 73.0 fL  Mean Cell Hemoglobin : 24.1 pg  Mean Cell Hemoglobin Concentration : 33.0 %  Auto Neutrophil # : x  Auto Lymphocyte # : x  Auto Monocyte # : x  Auto Eosinophil # : x  Auto Basophil # : x  Auto Neutrophil % : x  Auto Lymphocyte % : x  Auto Monocyte % : x  Auto Eosinophil % : x  Auto Basophil % : x    06-30    135  |  91<L>  |  24<H>  ----------------------------<  114<H>  3.3<L>   |  29  |  0.98  06-29    137  |  90<L>  |  30<H>  ----------------------------<  146<H>  3.5   |  27  |  1.15    Ca    7.0<L>      30 Jun 2018 05:30  Ca    6.6<L>      29 Jun 2018 15:00  Phos  5.1     06-30  Phos  4.7     06-29  Mg     1.7     06-30  Mg     1.8     06-29    TPro  7.5  /  Alb  3.5  /  TBili  3.9<H>  /  DBili  x   /  AST  104<H>  /  ALT  76<H>  /  AlkPhos  569<H>  06-30  TPro  8.2  /  Alb  3.8  /  TBili  5.2<H>  /  DBili  x   /  AST  127<H>  /  ALT  90<H>  /  AlkPhos  635<H>  06-29      proBNP: Serum Pro-Brain Natriuretic Peptide: 1795 pg/mL (06-29 @ 03:30)  Serum Pro-Brain Natriuretic Peptide: 2348 pg/mL (06-28 @ 17:55)

## 2018-06-30 NOTE — DISCHARGE NOTE ADULT - MEDICATION SUMMARY - MEDICATIONS TO STOP TAKING
I will STOP taking the medications listed below when I get home from the hospital:    hydrALAZINE 50 mg oral tablet  -- 1 tab(s) by mouth 3 times a day    Demadex  -- 80 milligram(s) by mouth 2 times a day    potassium chloride  -- 40 milliequivalent(s) by mouth once a day I will STOP taking the medications listed below when I get home from the hospital:    hydrALAZINE 50 mg oral tablet  -- 1 tab(s) by mouth 3 times a day

## 2018-06-30 NOTE — DISCHARGE NOTE ADULT - SECONDARY DIAGNOSIS.
Hypertension, unspecified type Type 2 diabetes mellitus with diabetic peripheral angiopathy without gangrene, without long-term current use of insulin

## 2018-06-30 NOTE — PROGRESS NOTE ADULT - SUBJECTIVE AND OBJECTIVE BOX
Patient denenies CP, breathing better Review of systems otherwise (-)    aspirin enteric coated 81 milliGRAM(s) Oral daily  calcitriol   Capsule 1 MICROGram(s) Oral daily  calcium acetate 667 milliGRAM(s) Oral three times a day with meals  calcium carbonate 1250 mG + Vitamin D (OsCal 500 + D) 1 Tablet(s) Oral three times a day  captopril 6.25 milliGRAM(s) Oral three times a day  chlorhexidine 4% Liquid 1 Application(s) Topical <User Schedule>  cholecalciferol 1000 Unit(s) Oral daily  dextrose 40% Gel 15 Gram(s) Oral once PRN  dextrose 5%. 1000 milliLiter(s) IV Continuous <Continuous>  dextrose 50% Injectable 12.5 Gram(s) IV Push once  dextrose 50% Injectable 25 Gram(s) IV Push once  dextrose 50% Injectable 25 Gram(s) IV Push once  famotidine    Tablet 20 milliGRAM(s) Oral two times a day  furosemide Infusion 10 mG/Hr IV Continuous <Continuous>  gabapentin 100 milliGRAM(s) Oral every 8 hours  glucagon  Injectable 1 milliGRAM(s) IntraMuscular once PRN  heparin  Injectable 5000 Unit(s) SubCutaneous every 8 hours  insulin lispro (HumaLOG) corrective regimen sliding scale   SubCutaneous three times a day before meals  insulin lispro (HumaLOG) corrective regimen sliding scale   SubCutaneous at bedtime  levothyroxine 175 MICROGram(s) Oral daily  magnesium oxide 400 milliGRAM(s) Oral two times a day with meals  milrinone Infusion 0.25 MICROgram(s)/kG/Min IV Continuous <Continuous>                            11.6   5.71  )-----------( 278      ( 30 Jun 2018 05:30 )             35.1       Hemoglobin: 11.6 g/dL (06-30 @ 05:30)  Hemoglobin: 10.7 g/dL (06-29 @ 03:30)  Hemoglobin: 11.7 g/dL (06-28 @ 17:55)      06-30    135  |  91<L>  |  24<H>  ----------------------------<  114<H>  3.3<L>   |  29  |  0.98    Ca    7.0<L>      30 Jun 2018 05:30  Phos  5.1     06-30  Mg     1.7     06-30    TPro  7.5  /  Alb  3.5  /  TBili  3.9<H>  /  DBili  x   /  AST  104<H>  /  ALT  76<H>  /  AlkPhos  569<H>  06-30    Creatinine Trend: 0.98<--, 1.15<--, 1.19<--, 1.30<--, 0.91<--    COAGS:           T(C): 36.5 (06-30-18 @ 12:30), Max: 36.8 (06-30-18 @ 04:00)  HR: 94 (06-30-18 @ 13:49) (85 - 99)  BP: 112/65 (06-30-18 @ 13:49) (89/56 - 132/83)  RR: 23 (06-30-18 @ 13:49) (12 - 28)  SpO2: 100% (06-30-18 @ 13:49) (96% - 100%)  Wt(kg): --    I&O's Summary    29 Jun 2018 07:01 - 30 Jun 2018 07:00  --------------------------------------------------------  IN: 1070 mL / OUT: 4050 mL / NET: -2980 mL    30 Jun 2018 07:01 - 30 Jun 2018 14:03  --------------------------------------------------------  IN: 705 mL / OUT: 1350 mL / NET: -645 mL        Height (cm): 170.18 (06-28 @ 22:00)  Weight (kg): 100.1 (06-28 @ 22:00)  BMI (kg/m2): 34.6 (06-28 @ 22:00)  BSA (m2): 2.11 (06-28 @ 22:00)    Gen: NAD  HEENT:  (-)icterus (-)pallor  CV: N S1 S2 1/6 ROSALINDA (+)2 Pulses B/l  Resp:  Clear to ausculatation B/L, normal effort  GI: (+) BS Soft, NT, ND  Lymph:  (+)Edema, (-)obvious lymphadenopathy  Skin: Warm to touch, Normal turgor  Psych: Appropriate mood and affect    TELEMETRY: 	  sinus VPACED  	      ASSESSMENT/PLAN: 	62y Female severe NICM (LVEF 10-15%) who had a Med[employer]onic Biv-ICD implanted at [facility]. A recent LHC at [facility] was also unremarkable. She now returns for with a severe decompensation of her NICM (JVP > 20, hepatic congestion, abdominal pain, LE edema and a near 20 lb weight gain).    -  clinically much improved, at dry weight  - Change Lasix gtt to Torsemide 40 mg PO BID  - Start Digoxin 0.125mg PO daily  - Anticipate can D/C milrinone later today  - No urgency to starting Beta blocker, cont to hold      Augusto Grimes MD, FACC

## 2018-06-30 NOTE — PROGRESS NOTE ADULT - ASSESSMENT
62F with a past medical history of hypertension, hyperlipidemia, chronic RBBB,  obesity, thyroid CA s/p thyroidectomy ,chronic hypocalcemia  , nonischemic cardiomyopathy with an ejection fraction of 11% and normal coronaries on recent cardiac catheterization at [facility] with Dr Chavarria , s/p Medtronic Sofi MRI CRT-ICD placement in June 2017, HFrEF 11% , multiple admission  for decompensated HF presents  with acute on chronic decompensated HF requiring inotrope support diuresis.

## 2018-06-30 NOTE — PROGRESS NOTE ADULT - SUBJECTIVE AND OBJECTIVE BOX
NEPHROLOGY-[facility]  [phone]  Negin Woodson NP      Patient seen and examined. ambulating in room. She denies sob, chest pain, no nausea/vomiting.  on Lasix and Milrinone infusions      MEDICATIONS  (STANDING):  aspirin enteric coated 81 milliGRAM(s) Oral daily  calcitriol   Capsule 1 MICROGram(s) Oral daily  calcium acetate 667 milliGRAM(s) Oral three times a day with meals  calcium carbonate 1250 mG + Vitamin D (OsCal 500 + D) 1 Tablet(s) Oral three times a day  captopril 6.25 milliGRAM(s) Oral three times a day  chlorhexidine 4% Liquid 1 Application(s) Topical <User Schedule>  cholecalciferol 1000 Unit(s) Oral daily  dextrose 5%. 1000 milliLiter(s) (50 mL/Hr) IV Continuous <Continuous>  dextrose 50% Injectable 12.5 Gram(s) IV Push once  dextrose 50% Injectable 25 Gram(s) IV Push once  dextrose 50% Injectable 25 Gram(s) IV Push once  famotidine    Tablet 20 milliGRAM(s) Oral two times a day  furosemide Infusion 10 mG/Hr (5 mL/Hr) IV Continuous <Continuous>  gabapentin 100 milliGRAM(s) Oral every 8 hours  heparin  Injectable 5000 Unit(s) SubCutaneous every 8 hours  insulin lispro (HumaLOG) corrective regimen sliding scale   SubCutaneous three times a day before meals  insulin lispro (HumaLOG) corrective regimen sliding scale   SubCutaneous at bedtime  levothyroxine 175 MICROGram(s) Oral daily  magnesium oxide 400 milliGRAM(s) Oral two times a day with meals  milrinone Infusion 0.25 MICROgram(s)/kG/Min (7.5 mL/Hr) IV Continuous <Continuous>      VITAL:  T(C): , Max: 36.8 (06-30-18 @ 04:00)  T(F): , Max: 98.2 (06-30-18 @ 04:00)  HR: 85 (06-30-18 @ 16:18)  BP: 117/71 (06-30-18 @ 16:18)  BP(mean): 64 (06-30-18 @ 15:00)  RR: 23 (06-30-18 @ 16:18)  SpO2: 100% (06-30-18 @ 16:18)  Wt(kg): --    I and O's:    06-29 @ 07:01 - 06-30 @ 07:00  --------------------------------------------------------  IN: 1070 mL / OUT: 4050 mL / NET: -2980 mL    06-30 @ 07:01 - 06-30 @ 16:28  --------------------------------------------------------  IN: 975 mL / OUT: 2000 mL / NET: -1025 mL          PHYSICAL EXAM:    Constitutional: NAD     Neck:  No JVD  Respiratory: CTAB/L  Cardiovascular: S1 and S2  Gastrointestinal: BS+, soft, NT, Distended  Extremities: + LE edema  : No Gustafson  Skin: No rashes  Access: Not applicable    LABS:                        11.6   5.71  )-----------( 278      ( 30 Jun 2018 05:30 )             35.1     30 Jun 2018 14:40    135    |  89<L>  |  19     ----------------------------<  151<H>  3.6     |  28     |  0.99   30 Jun 2018 05:30    135    |  91<L>  |  24<H>  ----------------------------<  114<H>  3.3<L>   |  29     |  0.98     Ca    7.0<L>      30 Jun 2018 14:40  Ca    7.0<L>      30 Jun 2018 05:30  Phos  4.6<H>     30 Jun 2018 14:40  Phos  5.1<H>     30 Jun 2018 05:30  Mg     2.3       30 Jun 2018 14:40  Mg     1.7       30 Jun 2018 05:30    TPro  8.2    /  Alb  3.7    /  TBili  3.9<H>  /  DBili  x      /  AST  102<H>  /  ALT  80<H>  /  AlkPhos  606<H>  30 Jun 2018 14:40  TPro  7.5    /  Alb  3.5    /  TBili  3.9<H>  /  DBili  x      /  AST  104<H>  /  ALT  76<H>  /  AlkPhos  569<H>  30 Jun 2018 05:30        Urine Studies:      RADIOLOGY & ADDITIONAL STUDIES:      ASSESSMENT AND PLAN:  A 62-year-old female with past medical history of hypertension, diabetes, thyroid cancer, congestive heart failure, who presents to the hospital with acute decompensated systolic heart failure.  The patient was found to have worsening hepatitis in addition to an elevated lactate.  Now, the patient is perfused and warm and urinating without issues.  She is on constant inotropic support with milrinone and on a Lasix drip.  Hypocalcemia is chronic in nature and likely related to the parathyroid removal.  1.	Endocrine.   calcitriol increased to 1 mcg a day. Agree with additonal doses of calcium gluconate today  2.	Renal.  Continue with the Lasix drip; diuresing well. Keep out's greater than in's.  Her hyponatremia is secondary to heart failure and is improving    Hyponatremia is obviously a poor prognostic indicator for heart failure  3.	Cardiovascular.  Continue with milrinone.  Consider hydralazine and nitrates for after load reduction.

## 2018-06-30 NOTE — DISCHARGE NOTE ADULT - MEDICATION SUMMARY - MEDICATIONS TO TAKE
I will START or STAY ON the medications listed below when I get home from the hospital:    Aldactone 25 mg oral tablet  -- 1 tab(s) by mouth once a day   -- It is very important that you take or use this exactly as directed.  Do not skip doses or discontinue unless directed by your doctor.  May cause drowsiness or dizziness.    -- Indication: For Acute on chronic systolic heart failure    aspirin 81 mg oral delayed release tablet  -- 1 tab(s) by mouth once a day  -- Indication: For coronary artery disease    valsartan 40 mg oral tablet  -- 0.5 tab(s) by mouth 2 times a day   -- Do not take this drug if you are pregnant.  It is very important that you take or use this exactly as directed.  Do not skip doses or discontinue unless directed by your doctor.  Some non-prescription drugs may aggravate your condition.  Read all labels carefully.  If a warning appears, check with your doctor before taking.    -- Indication: For Acute on chronic systolic congestive heart failure    sacubitril-valsartan 24 mg-26 mg oral tablet  -- 1 tab(s) by mouth 2 times a day  -- Indication: For Acute on chronic systolic congestive heart failure    gabapentin 100 mg oral capsule  -- 1 cap(s) by mouth every 8 hours  -- Indication: For Low extremity pain    Tradjenta 5 mg oral tablet  -- 1 tab(s) by mouth once a day  -- Indication: For Diabetes    metoprolol succinate 25 mg oral tablet, extended release  -- 1 tab(s) by mouth once a day  -- Indication: For Acute on chronic systolic congestive heart failure    torsemide 20 mg oral tablet  -- 2 tab(s) by mouth 2 times a day   -- Avoid prolonged or excessive exposure to direct and/or artificial sunlight while taking this medication.  It is very important that you take or use this exactly as directed.  Do not skip doses or discontinue unless directed by your doctor.  It may be advisable to drink a full glass orange juice or eat a banana daily while taking this medication.    -- Indication: For Acute on chronic systolic congestive heart failure    famotidine 20 mg oral tablet  -- 1 tab(s) by mouth 2 times a day  -- Indication: For GERD    K-Tab 20 mEq oral tablet, extended release  -- 1 tab(s) by mouth once a day   -- It is very important that you take or use this exactly as directed.  Do not skip doses or discontinue unless directed by your doctor.  Medication should be taken with plenty of water.  Take with food or milk.    -- Indication: For Potassium supplement    magnesium oxide 400 mg (241.3 mg elemental magnesium) oral tablet  -- 1 tab(s) by mouth 3 times a day (with meals)  -- Indication: For Magnesium supplement    calcium acetate 667 mg oral tablet  --  by mouth   -- Indication: For Bones    levothyroxine 175 mcg (0.175 mg) oral tablet  -- 1 tab(s) by mouth once a day  -- Indication: For Thyroid    calcium-vitamin D 500 mg-200 intl units oral tablet  -- 1 tab(s) by mouth 3 times a day  -- Indication: For Calcium    calcitriol 0.5 mcg oral capsule  -- 1 cap(s) by mouth once a day (at bedtime)  -- Indication: For Supplement    cholecalciferol oral tablet  -- 1 tab(s) by mouth once a day , 1000 units  -- Indication: For Supplement I will START or STAY ON the medications listed below when I get home from the hospital:    Aldactone 25 mg oral tablet  -- 1 tab(s) by mouth once a day   -- It is very important that you take or use this exactly as directed.  Do not skip doses or discontinue unless directed by your doctor.  May cause drowsiness or dizziness.    -- Indication: For Acute on chronic systolic heart failure    aspirin 81 mg oral delayed release tablet  -- 1 tab(s) by mouth once a day  -- Indication: For coronary artery disease    valsartan 40 mg oral tablet  -- 0.5 tab(s) by mouth 2 times a day   -- Do not take this drug if you are pregnant.  It is very important that you take or use this exactly as directed.  Do not skip doses or discontinue unless directed by your doctor.  Some non-prescription drugs may aggravate your condition.  Read all labels carefully.  If a warning appears, check with your doctor before taking.    -- Indication: For Acute on chronic systolic congestive heart failure    sacubitril-valsartan 24 mg-26 mg oral tablet  -- 1 tab(s) by mouth 2 times a day  -- Indication: For Acute on chronic systolic congestive heart failure    gabapentin 100 mg oral capsule  -- 1 cap(s) by mouth every 8 hours  -- Indication: For Low extremity pain    Tradjenta 5 mg oral tablet  -- 1 tab(s) by mouth once a day  -- Indication: For Diabetes    metoprolol succinate 25 mg oral tablet, extended release  -- 1 tab(s) by mouth once a day  -- Indication: For Acute on chronic systolic congestive heart failure    torsemide 20 mg oral tablet  -- 2 tab(s) by mouth 2 times a day   -- Avoid prolonged or excessive exposure to direct and/or artificial sunlight while taking this medication.  It is very important that you take or use this exactly as directed.  Do not skip doses or discontinue unless directed by your doctor.  It may be advisable to drink a full glass orange juice or eat a banana daily while taking this medication.    -- Indication: For Acute on chronic systolic congestive heart failure    famotidine 20 mg oral tablet  -- 1 tab(s) by mouth 2 times a day  -- Indication: For GERD    K-Tab 20 mEq oral tablet, extended release  -- 1 tab(s) by mouth once a day   -- It is very important that you take or use this exactly as directed.  Do not skip doses or discontinue unless directed by your doctor.  Medication should be taken with plenty of water.  Take with food or milk.    -- Indication: For Potassium supplement    magnesium oxide 400 mg (241.3 mg elemental magnesium) oral tablet  -- 1 tab(s) by mouth 3 times a day (with meals)  -- Indication: For Magnesium supplement    calcium acetate 667 mg oral tablet  -- 1 tab(s) by mouth 3 times a day  -- Indication: For Calcium supplement    levothyroxine 175 mcg (0.175 mg) oral tablet  -- 1 tab(s) by mouth once a day  -- Indication: For Thyroid    calcium-vitamin D 500 mg-200 intl units oral tablet  -- 1 tab(s) by mouth 3 times a day  -- Indication: For Calcium    calcitriol 0.5 mcg oral capsule  -- 1 cap(s) by mouth once a day (at bedtime)  -- Indication: For Supplement    cholecalciferol oral tablet  -- 1 tab(s) by mouth once a day , 1000 units  -- Indication: For Supplement

## 2018-06-30 NOTE — DISCHARGE NOTE ADULT - PLAN OF CARE
Maintain current weight Daily weights, continue current medications, follow up with heart failure team Maintain blood pressure Continue blood pressure medications Maintain blood sugar level Continue diabetes medications and diet modifications

## 2018-06-30 NOTE — DISCHARGE NOTE ADULT - NS AS DC HF EDUCATION INSTRUCTIONS
Monitor Weight Daily/Report weight gain of 2 or more pounds in one day or 3 or more pounds in one week, worsening shortness of breath, fatigue, weakness, increased swelling of hands and feet to primary care provider/wt today=91.4 kg/Activities as tolerated/Call Primary Care Provider for follow-up after discharge/Low salt diet

## 2018-06-30 NOTE — DISCHARGE NOTE ADULT - CARE PROVIDER_API CALL
Augusto Grimes), Cardiovascular Disease  [address] [facility] [address]  [place] [address]  Phone: [phone]  Fax: [phone]    Bill Santana), Adv Heart Fail [facility] Card[facility]  [address] [place] [address]  Phone: [phone]  Fax: [phone]

## 2018-06-30 NOTE — PROGRESS NOTE ADULT - PROBLEM SELECTOR PLAN 3
- Thyroid CA s/p thyroidectomy with chronic hypocalcemia (likely secondary to resection of parathyroid gland)  - Ca2+ today is 5.9; Replete Calcium to goal of 7.5  - Given 2 doses of Calcium gluconate IV 2 grams  - C/w Calcitriol, Phoslo, Calcium carbonate, and Vit. D3  - 6/29: Ordered CMP w/ Mg & Phos.  - 6/29: Ordered PTH, Calcium, Vit. D, iron studies (given the MCV of 72). If iron is low, replete with IV iron.  - Appreciate nephrology reccs.

## 2018-06-30 NOTE — PROGRESS NOTE ADULT - PROBLEM SELECTOR PLAN 1
- Presented with acute decompensated systolic HF s  - AICD interrogated-no shocks, NSVT  - HF consulted  - for advanced therapies (VAD vs transplant)

## 2018-06-30 NOTE — DISCHARGE NOTE ADULT - CARE PLAN
Principal Discharge DX:	Acute on chronic systolic congestive heart failure  Goal:	Maintain current weight  Assessment and plan of treatment:	Daily weights, continue current medications, follow up with heart failure team  Secondary Diagnosis:	Hypertension, unspecified type  Goal:	Maintain blood pressure  Assessment and plan of treatment:	Continue blood pressure medications  Secondary Diagnosis:	Type 2 diabetes mellitus with diabetic peripheral angiopathy without gangrene, without long-term current use of insulin  Goal:	Maintain blood sugar level  Assessment and plan of treatment:	Continue diabetes medications and diet modifications

## 2018-06-30 NOTE — DISCHARGE NOTE ADULT - CARE PROVIDERS DIRECT ADDRESSES
,DirectAddress_Unknown,[email or web address]

## 2018-07-01 LAB
BUN SERPL-MCNC: 25 MG/DL — HIGH (ref 7–23)
CA-I BLD-SCNC: 0.72 MMOL/L — CRITICAL LOW (ref 1.03–1.23)
CALCIUM SERPL-MCNC: 7.3 MG/DL — LOW (ref 8.4–10.5)
CHLORIDE SERPL-SCNC: 91 MMOL/L — LOW (ref 98–107)
CO2 SERPL-SCNC: 29 MMOL/L — SIGNIFICANT CHANGE UP (ref 22–31)
CREAT SERPL-MCNC: 1.27 MG/DL — SIGNIFICANT CHANGE UP (ref 0.5–1.3)
GLUCOSE BLDC GLUCOMTR-MCNC: 116 MG/DL — HIGH (ref 70–99)
GLUCOSE BLDC GLUCOMTR-MCNC: 120 MG/DL — HIGH (ref 70–99)
GLUCOSE BLDC GLUCOMTR-MCNC: 227 MG/DL — HIGH (ref 70–99)
GLUCOSE BLDC GLUCOMTR-MCNC: 271 MG/DL — HIGH (ref 70–99)
GLUCOSE SERPL-MCNC: 191 MG/DL — HIGH (ref 70–99)
HCT VFR BLD CALC: 36.3 % — SIGNIFICANT CHANGE UP (ref 34.5–45)
HGB BLD-MCNC: 11.8 G/DL — SIGNIFICANT CHANGE UP (ref 11.5–15.5)
MAGNESIUM SERPL-MCNC: 2 MG/DL — SIGNIFICANT CHANGE UP (ref 1.6–2.6)
MCHC RBC-ENTMCNC: 23.8 PG — LOW (ref 27–34)
MCHC RBC-ENTMCNC: 32.5 % — SIGNIFICANT CHANGE UP (ref 32–36)
MCV RBC AUTO: 73.3 FL — LOW (ref 80–100)
NRBC # FLD: 0 — SIGNIFICANT CHANGE UP
PHOSPHATE SERPL-MCNC: 4.8 MG/DL — HIGH (ref 2.5–4.5)
PLATELET # BLD AUTO: 276 K/UL — SIGNIFICANT CHANGE UP (ref 150–400)
PMV BLD: 9.9 FL — SIGNIFICANT CHANGE UP (ref 7–13)
POTASSIUM SERPL-MCNC: 3.8 MMOL/L — SIGNIFICANT CHANGE UP (ref 3.5–5.3)
POTASSIUM SERPL-SCNC: 3.8 MMOL/L — SIGNIFICANT CHANGE UP (ref 3.5–5.3)
RBC # BLD: 4.95 M/UL — SIGNIFICANT CHANGE UP (ref 3.8–5.2)
RBC # FLD: 25 % — HIGH (ref 10.3–14.5)
SODIUM SERPL-SCNC: 134 MMOL/L — LOW (ref 135–145)
WBC # BLD: 6.39 K/UL — SIGNIFICANT CHANGE UP (ref 3.8–10.5)
WBC # FLD AUTO: 6.39 K/UL — SIGNIFICANT CHANGE UP (ref 3.8–10.5)

## 2018-07-01 RX ORDER — POTASSIUM CHLORIDE 20 MEQ
20 PACKET (EA) ORAL ONCE
Qty: 0 | Refills: 0 | Status: COMPLETED | OUTPATIENT
Start: 2018-07-01 | End: 2018-07-01

## 2018-07-01 RX ORDER — FUROSEMIDE 40 MG
60 TABLET ORAL
Qty: 0 | Refills: 0 | Status: DISCONTINUED | OUTPATIENT
Start: 2018-07-01 | End: 2018-07-02

## 2018-07-01 RX ORDER — CALCIUM GLUCONATE 100 MG/ML
2 VIAL (ML) INTRAVENOUS ONCE
Qty: 0 | Refills: 0 | Status: COMPLETED | OUTPATIENT
Start: 2018-07-01 | End: 2018-07-01

## 2018-07-01 RX ADMIN — FAMOTIDINE 20 MILLIGRAM(S): 10 INJECTION INTRAVENOUS at 17:21

## 2018-07-01 RX ADMIN — Medication 1000 UNIT(S): at 12:34

## 2018-07-01 RX ADMIN — Medication 1 TABLET(S): at 06:15

## 2018-07-01 RX ADMIN — Medication 81 MILLIGRAM(S): at 12:33

## 2018-07-01 RX ADMIN — Medication 667 MILLIGRAM(S): at 12:34

## 2018-07-01 RX ADMIN — GABAPENTIN 100 MILLIGRAM(S): 400 CAPSULE ORAL at 22:20

## 2018-07-01 RX ADMIN — GABAPENTIN 100 MILLIGRAM(S): 400 CAPSULE ORAL at 15:32

## 2018-07-01 RX ADMIN — Medication 1 TABLET(S): at 22:23

## 2018-07-01 RX ADMIN — Medication 175 MICROGRAM(S): at 06:15

## 2018-07-01 RX ADMIN — Medication 20 MILLIEQUIVALENT(S): at 03:53

## 2018-07-01 RX ADMIN — Medication 2: at 12:32

## 2018-07-01 RX ADMIN — Medication 1 TABLET(S): at 15:29

## 2018-07-01 RX ADMIN — Medication 200 GRAM(S): at 08:22

## 2018-07-01 RX ADMIN — MAGNESIUM OXIDE 400 MG ORAL TABLET 400 MILLIGRAM(S): 241.3 TABLET ORAL at 08:25

## 2018-07-01 RX ADMIN — CHLORHEXIDINE GLUCONATE 1 APPLICATION(S): 213 SOLUTION TOPICAL at 08:23

## 2018-07-01 RX ADMIN — Medication 667 MILLIGRAM(S): at 08:26

## 2018-07-01 RX ADMIN — CAPTOPRIL 6.25 MILLIGRAM(S): 12.5 TABLET ORAL at 22:20

## 2018-07-01 RX ADMIN — Medication 60 MILLIGRAM(S): at 17:21

## 2018-07-01 RX ADMIN — FAMOTIDINE 20 MILLIGRAM(S): 10 INJECTION INTRAVENOUS at 06:15

## 2018-07-01 RX ADMIN — MAGNESIUM OXIDE 400 MG ORAL TABLET 400 MILLIGRAM(S): 241.3 TABLET ORAL at 17:21

## 2018-07-01 RX ADMIN — Medication 1: at 22:14

## 2018-07-01 RX ADMIN — CAPTOPRIL 6.25 MILLIGRAM(S): 12.5 TABLET ORAL at 15:29

## 2018-07-01 RX ADMIN — CALCITRIOL 1 MICROGRAM(S): 0.5 CAPSULE ORAL at 12:33

## 2018-07-01 RX ADMIN — GABAPENTIN 100 MILLIGRAM(S): 400 CAPSULE ORAL at 06:15

## 2018-07-01 RX ADMIN — CAPTOPRIL 6.25 MILLIGRAM(S): 12.5 TABLET ORAL at 06:15

## 2018-07-01 RX ADMIN — Medication 667 MILLIGRAM(S): at 17:21

## 2018-07-01 NOTE — PROGRESS NOTE ADULT - PROBLEM SELECTOR PROBLEM 1
Acute on chronic systolic congestive heart failure

## 2018-07-01 NOTE — PROGRESS NOTE ADULT - SUBJECTIVE AND OBJECTIVE BOX
NEPHROLOGY-[facility]  [phone]  Negin Woodson NP      Patient seen and examined in chair. Awake, alert, oriented. She wants to go home.  Not sob, ambulating to bathroom.   Denies chest pain      MEDICATIONS  (STANDING):  aspirin enteric coated 81 milliGRAM(s) Oral daily  calcitriol   Capsule 1 MICROGram(s) Oral daily  calcium acetate 667 milliGRAM(s) Oral three times a day with meals  calcium carbonate 1250 mG + Vitamin D (OsCal 500 + D) 1 Tablet(s) Oral three times a day  captopril 6.25 milliGRAM(s) Oral three times a day  chlorhexidine 4% Liquid 1 Application(s) Topical <User Schedule>  cholecalciferol 1000 Unit(s) Oral daily  dextrose 5%. 1000 milliLiter(s) (50 mL/Hr) IV Continuous <Continuous>  dextrose 50% Injectable 12.5 Gram(s) IV Push once  dextrose 50% Injectable 25 Gram(s) IV Push once  dextrose 50% Injectable 25 Gram(s) IV Push once  famotidine    Tablet 20 milliGRAM(s) Oral two times a day  furosemide   Injectable 60 milliGRAM(s) IV Push two times a day  gabapentin 100 milliGRAM(s) Oral every 8 hours  heparin  Injectable 5000 Unit(s) SubCutaneous every 8 hours  insulin lispro (HumaLOG) corrective regimen sliding scale   SubCutaneous three times a day before meals  insulin lispro (HumaLOG) corrective regimen sliding scale   SubCutaneous at bedtime  levothyroxine 175 MICROGram(s) Oral daily  magnesium oxide 400 milliGRAM(s) Oral two times a day with meals      VITAL:  T(C): , Max: 36.7 (07-01-18 @ 04:00)  T(F): , Max: 98 (07-01-18 @ 04:00)  HR: 100 (07-01-18 @ 11:00)  BP: 114/72 (07-01-18 @ 10:00)  BP(mean): 82 (07-01-18 @ 10:00)  RR: 25 (07-01-18 @ 11:00)  SpO2: 96% (07-01-18 @ 06:00)  Wt(kg): --    I and O's:    06-30 @ 07:01  -  07-01 @ 07:00  --------------------------------------------------------  IN: 1487.5 mL / OUT: 4550 mL / NET: -3062.5 mL          PHYSICAL EXAM:    Constitutional: NAD  Neck:  No JVD  Respiratory: CTAB/L  Cardiovascular: S1 and S2  Gastrointestinal: BS+, soft, NT/ND  Extremities: + LE edema  : No Gustafson  Skin: No rashes  Access: Not applicable    LABS:                        11.8   6.39  )-----------( 276      ( 01 Jul 2018 00:50 )             36.3     01 Jul 2018 00:50    134<L>  |  91<L>  |  25<H>  ----------------------------<  191<H>  3.8     |  29     |  1.27   30 Jun 2018 14:40    135    |  89<L>  |  19     ----------------------------<  151<H>  3.6     |  28     |  0.99     Ca    7.3<L>      01 Jul 2018 00:50  Ca    7.0<L>      30 Jun 2018 14:40  Phos  4.8<H>     01 Jul 2018 00:50  Phos  4.6<H>     30 Jun 2018 14:40  Mg     2.0       01 Jul 2018 00:50  Mg     2.3       30 Jun 2018 14:40    TPro  8.2    /  Alb  3.7    /  TBili  3.9<H>  /  DBili  x      /  AST  102<H>  /  ALT  80<H>  /  AlkPhos  606<H>  30 Jun 2018 14:40  TPro  7.5    /  Alb  3.5    /  TBili  3.9<H>  /  DBili  x      /  AST  104<H>  /  ALT  76<H>  /  AlkPhos  569<H>  30 Jun 2018 05:30        Urine Studies:      RADIOLOGY & ADDITIONAL STUDIES:    ASSESSMENT AND PLAN:    62yearold female with past medical history of hypertension, diabetes, thyroid cancer, congestive heart failure, who presents to the hospital with acute decompensated systolic heart failure.  The patient was found to have worsening hepatitis in addition to an elevated lactate.  Now, the patient is perfused and warm and urinating without issues.  She is on constant inotropic support with milrinone and on a Lasix drip.  Hypocalcemia is chronic in nature and likely related to the parathyroid removal.  Endocrine.  c/w Calcitriol 1 mcg a day. Agree with additional doses of calcium gluconate   Renal. Now on Lasix 60mg IV BID; diuresing well. Keep out's greater than in's.  Her hyponatremia is secondary to heart failure and is stable.  Hyponatremia is obviously a poor prognostic indicator for heart failure  Cardiovascular. off milrinone. Consider hydralazine and nitrates for after load reduction.

## 2018-07-01 NOTE — PROGRESS NOTE ADULT - SUBJECTIVE AND OBJECTIVE BOX
pt seen and examined, no complaints on exam.   Off primacor gtt , tele stable   SOB less today     aspirin enteric coated 81 milliGRAM(s) Oral daily  calcitriol   Capsule 1 MICROGram(s) Oral daily  calcium acetate 667 milliGRAM(s) Oral three times a day with meals  calcium carbonate 1250 mG + Vitamin D (OsCal 500 + D) 1 Tablet(s) Oral three times a day  captopril 6.25 milliGRAM(s) Oral three times a day  chlorhexidine 4% Liquid 1 Application(s) Topical <User Schedule>  cholecalciferol 1000 Unit(s) Oral daily  dextrose 40% Gel 15 Gram(s) Oral once PRN  dextrose 5%. 1000 milliLiter(s) IV Continuous <Continuous>  dextrose 50% Injectable 12.5 Gram(s) IV Push once  dextrose 50% Injectable 25 Gram(s) IV Push once  dextrose 50% Injectable 25 Gram(s) IV Push once  famotidine    Tablet 20 milliGRAM(s) Oral two times a day  furosemide   Injectable 60 milliGRAM(s) IV Push two times a day  gabapentin 100 milliGRAM(s) Oral every 8 hours  glucagon  Injectable 1 milliGRAM(s) IntraMuscular once PRN  heparin  Injectable 5000 Unit(s) SubCutaneous every 8 hours  insulin lispro (HumaLOG) corrective regimen sliding scale   SubCutaneous three times a day before meals  insulin lispro (HumaLOG) corrective regimen sliding scale   SubCutaneous at bedtime  levothyroxine 175 MICROGram(s) Oral daily  magnesium oxide 400 milliGRAM(s) Oral two times a day with meals                            11.8   6.39  )-----------( 276      ( 01 Jul 2018 00:50 )             36.3       Hemoglobin: 11.8 g/dL (07-01 @ 00:50)  Hemoglobin: 11.6 g/dL (06-30 @ 05:30)  Hemoglobin: 10.7 g/dL (06-29 @ 03:30)  Hemoglobin: 11.7 g/dL (06-28 @ 17:55)      07-01    134<L>  |  91<L>  |  25<H>  ----------------------------<  191<H>  3.8   |  29  |  1.27    Ca    7.3<L>      01 Jul 2018 00:50  Phos  4.8     07-01  Mg     2.0     07-01    TPro  8.2  /  Alb  3.7  /  TBili  3.9<H>  /  DBili  x   /  AST  102<H>  /  ALT  80<H>  /  AlkPhos  606<H>  06-30    Creatinine Trend: 1.27<--, 0.99<--, 0.98<--, 1.15<--, 1.19<--, 1.30<--    COAGS:           T(C): 36.6 (07-01-18 @ 16:00), Max: 36.7 (07-01-18 @ 04:00)  HR: 91 (07-01-18 @ 18:00) (85 - 105)  BP: 108/74 (07-01-18 @ 18:00) (89/49 - 117/70)  RR: 17 (07-01-18 @ 18:00) (13 - 32)  SpO2: 96% (07-01-18 @ 06:00) (93% - 98%)  Wt(kg): --    I&O's Summary    30 Jun 2018 07:01  -  01 Jul 2018 07:00  --------------------------------------------------------  IN: 1487.5 mL / OUT: 4550 mL / NET: -3062.5 mL    01 Jul 2018 07:01  -  01 Jul 2018 18:41  --------------------------------------------------------  IN: 340.5 mL / OUT: 1100 mL / NET: -759.5 mL        Gen: NAD  HEENT:  (-)icterus (-)pallor  CV: N S1 S2 1/6 ROSALINDA (+)2 Pulses B/l  Resp:  Clear to ausculatation B/L, normal effort  GI: (+) BS Soft, NT, ND  Lymph:  (+)Edema, (-)obvious lymphadenopathy  Skin: Warm to touch, Normal turgor  Psych: Appropriate mood and affect    TELEMETRY: 	  sinus/ VPACED  	      ASSESSMENT/PLAN: 	62y Female severe NICM (LVEF 10-15%) who had a Medtronic Biv-ICD implanted at [place]. A recent LHC at [facility] was also unremarkable. She now returns for with a severe decompensation of her NICM (JVP > 20, hepatic congestion, abdominal pain, LE edema and a near 20 lb weight gain).    -  clinically much improved, at dry weight  - cont Lasix IV q 12 hr  -cont ACE/ ASA, statin   - monitor off primacor gtt ,  No urgency to starting Beta blocker, cont to hold  -  GI / DVT prophylaxis,  keep K>4, mag >2.0   - daily weights   EPS follow up   D/W Dr Grimes

## 2018-07-01 NOTE — PROGRESS NOTE ADULT - PROBLEM SELECTOR PLAN 1
- Presented with acute decompensated systolic HF s  - AICD interrogated-no shocks, NSVT  - HF consulted  - for advanced therapies (VAD vs transplant) - Presented with acute decompensated systolic HF s  - AICD interrogated-no shocks, NSVT  - HF consulted  - For advanced therapies (VAD vs transplant) - Presented with acute decompensated systolic HF  - AICD interrogated-no shocks, NSVT  - Milrinone discontinued. Lasix switched to 60 IV BID.  - HF consulted for advanced therapies (VAD vs transplant) - AICD interrogation showed NSVT, no shock  - TTE performed 6/29 due to the pericardial effusion seen on CXR. Results as above.  - Milrinone discontinued. Lasix switched to 60 IV BID.  - HF consulted for advanced therapies (VAD vs transplant)  - C/w ASA  - Started on Captopril 6.25 mg PO TID on 6/29. Increase as tolerated.  - Hold Metroprolol, Valsartan and Torsemide  - Daily standing weight monitoring, Strict I/O, Low sodium DASH diet  - Monitor BMP and electrolytes as needed.

## 2018-07-01 NOTE — PROGRESS NOTE ADULT - ASSESSMENT
62F with a past medical history of hypertension, hyperlipidemia, chronic RBBB,  obesity, thyroid CA s/p thyroidectomy ,chronic hypocalcemia  , nonischemic cardiomyopathy with an ejection fraction of 11% and normal coronaries on recent cardiac catheterization at [facility] with Dr Chavarria , s/p Medtronic Sofi MRI CRT-ICD placement in June 2017, HFrEF 11% , multiple admission  for decompensated HF presents  with acute on chronic decompensated HF requiring inotrope support diuresis.

## 2018-07-01 NOTE — PROGRESS NOTE ADULT - SUBJECTIVE AND OBJECTIVE BOX
EKG: NSR, Biv pacing, no further VT    denies any palpations Anginal pain or near syncope Dyspnea has improved       MEDICATIONS  (STANDING):  aspirin enteric coated 81 milliGRAM(s) Oral daily  calcitriol   Capsule 1 MICROGram(s) Oral daily  calcium acetate 667 milliGRAM(s) Oral three times a day with meals  calcium carbonate 1250 mG + Vitamin D (OsCal 500 + D) 1 Tablet(s) Oral three times a day  captopril 6.25 milliGRAM(s) Oral three times a day  chlorhexidine 4% Liquid 1 Application(s) Topical <User Schedule>  cholecalciferol 1000 Unit(s) Oral daily  dextrose 5%. 1000 milliLiter(s) (50 mL/Hr) IV Continuous <Continuous>  dextrose 50% Injectable 12.5 Gram(s) IV Push once  dextrose 50% Injectable 25 Gram(s) IV Push once  dextrose 50% Injectable 25 Gram(s) IV Push once  famotidine    Tablet 20 milliGRAM(s) Oral two times a day  furosemide   Injectable 60 milliGRAM(s) IV Push two times a day  gabapentin 100 milliGRAM(s) Oral every 8 hours  heparin  Injectable 5000 Unit(s) SubCutaneous every 8 hours  insulin lispro (HumaLOG) corrective regimen sliding scale   SubCutaneous three times a day before meals  insulin lispro (HumaLOG) corrective regimen sliding scale   SubCutaneous at bedtime  levothyroxine 175 MICROGram(s) Oral daily  magnesium oxide 400 milliGRAM(s) Oral two times a day with meals    MEDICATIONS  (PRN):  dextrose 40% Gel 15 Gram(s) Oral once PRN Blood Glucose LESS THAN 70 milliGRAM(s)/deciliter  glucagon  Injectable 1 milliGRAM(s) IntraMuscular once PRN Glucose LESS THAN 70 milligrams/deciliter      LABS:                        11.8   6.39  )-----------( 276      ( 01 Jul 2018 00:50 )             36.3     Hemoglobin: 11.8 g/dL (07-01 @ 00:50)  Hemoglobin: 11.6 g/dL (06-30 @ 05:30)  Hemoglobin: 10.7 g/dL (06-29 @ 03:30)  Hemoglobin: 11.7 g/dL (06-28 @ 17:55)    07-01    134<L>  |  91<L>  |  25<H>  ----------------------------<  191<H>  3.8   |  29  |  1.27    Ca    7.3<L>      01 Jul 2018 00:50  Phos  4.8     07-01  Mg     2.0     07-01    TPro  8.2  /  Alb  3.7  /  TBili  3.9<H>  /  DBili  x   /  AST  102<H>  /  ALT  80<H>  /  AlkPhos  606<H>  06-30    Creatinine Trend: 1.27<--, 0.99<--, 0.98<--, 1.15<--, 1.19<--, 1.30<--           PHYSICAL EXAM  Vital Signs Last 24 Hrs  T(C): 36.1 (01 Jul 2018 08:00), Max: 36.7 (01 Jul 2018 04:00)  T(F): 97 (01 Jul 2018 08:00), Max: 98 (01 Jul 2018 04:00)  HR: 94 (01 Jul 2018 12:00) (83 - 105)  BP: 114/72 (01 Jul 2018 10:00) (89/49 - 126/87)  BP(mean): 82 (01 Jul 2018 10:00) (57 - 94)  RR: 22 (01 Jul 2018 12:00) (14 - 32)  SpO2: 96% (01 Jul 2018 06:00) (93% - 100%)      RRR, no murmurs  CTAB  soft nt/nd  +2 edema B/L LE's     repeat echo      EF 10 %   < from: TTE with Doppler (w/Cont) (06.29.18 @ 10:45) >  CONCLUSIONS:  1. Mitral annular calcification, otherwise normal mitral  valve. Moderate mitral regurgitation.  2. Moderately dilated left atrium.  LA volume index = 42  cc/m2.  3. Moderate left ventricular enlargement.  4. Severe global left ventricular systolic dysfunction.  Endocardial visualization enhanced with intravenous  injection of echo contrast (Definity).  No LV thrombus  seen.  5. Moderate right atrial enlargement.  6. Right ventricular enlargement with decreased right  ventricular systolic function.  A device wire is noted in  the right heart.  7. Normal tricuspid valve.  Severe tricuspid regurgitation.  *** Compared with echocardiogram of 4/26/2018,no  significant changes noted.    < end of copied text >      A/P) She is a pleasant 63 y/o female PMH severe NICM (LVEF 10-15%) who had a Medtronic Biv-ICD implanted at [place]. A recent LHC at [place] was also unremarkable. She now returns for with a severe decompensation of her NICM (JVP > 20, hepatic congestion, abdominal pain, LE edema and a near 20 lb weight gain). Her device interrogation demonstrates excellent RA, RV and LV lead functions, but with frequent episodes of NSVT. She denies palpitations nor high voltage therapy.     -continue IV Lasix    -CHF consult for advanced therapies (VAD vs transplant)       f/u recommendations   -no need for AAD at this time as no HV therapy  -no need to recheck ICD  -repeat echo as noted above   - monitor CA+ levels   -cont care as per ccu

## 2018-07-01 NOTE — PROGRESS NOTE ADULT - SUBJECTIVE AND OBJECTIVE BOX
Subjective:  Patient seen  in CCU this morning walking in her room  doing well. Denies complaints and states her breathing had greatly improve as has her LE edema. Denies chest pain/pleuritc chest pain, dizziness or lightheadedness.      MEDICATIONS  (STANDING):  aspirin enteric coated 81 milliGRAM(s) Oral daily  calcitriol   Capsule 1 MICROGram(s) Oral daily  calcium acetate 667 milliGRAM(s) Oral three times a day with meals  calcium carbonate 1250 mG + Vitamin D (OsCal 500 + D) 1 Tablet(s) Oral three times a day  captopril 6.25 milliGRAM(s) Oral three times a day  chlorhexidine 4% Liquid 1 Application(s) Topical <User Schedule>  cholecalciferol 1000 Unit(s) Oral daily  dextrose 5%. 1000 milliLiter(s) (50 mL/Hr) IV Continuous <Continuous>  dextrose 50% Injectable 12.5 Gram(s) IV Push once  dextrose 50% Injectable 25 Gram(s) IV Push once  dextrose 50% Injectable 25 Gram(s) IV Push once  famotidine    Tablet 20 milliGRAM(s) Oral two times a day  furosemide   Injectable 60 milliGRAM(s) IV Push two times a day  gabapentin 100 milliGRAM(s) Oral every 8 hours  heparin  Injectable 5000 Unit(s) SubCutaneous every 8 hours  insulin lispro (HumaLOG) corrective regimen sliding scale   SubCutaneous three times a day before meals  insulin lispro (HumaLOG) corrective regimen sliding scale   SubCutaneous at bedtime  levothyroxine 175 MICROGram(s) Oral daily  magnesium oxide 400 milliGRAM(s) Oral two times a day with meals    MEDICATIONS  (PRN):  dextrose 40% Gel 15 Gram(s) Oral once PRN Blood Glucose LESS THAN 70 milliGRAM(s)/deciliter  glucagon  Injectable 1 milliGRAM(s) IntraMuscular once PRN Glucose LESS THAN 70 milligrams/deciliter      LABS:                        11.8   6.39  )-----------( 276      ( 01 Jul 2018 00:50 )             36.3     Hemoglobin: 11.8 g/dL (07-01 @ 00:50)  Hemoglobin: 11.6 g/dL (06-30 @ 05:30)  Hemoglobin: 10.7 g/dL (06-29 @ 03:30)  Hemoglobin: 11.7 g/dL (06-28 @ 17:55)    07-01    134<L>  |  91<L>  |  25<H>  ----------------------------<  191<H>  3.8   |  29  |  1.27    Ca    7.3<L>      01 Jul 2018 00:50  Phos  4.8     07-01  Mg     2.0     07-01    TPro  8.2  /  Alb  3.7  /  TBili  3.9<H>  /  DBili  x   /  AST  102<H>  /  ALT  80<H>  /  AlkPhos  606<H>  06-30    Creatinine Trend: 1.27<--, 0.99<--, 0.98<--, 1.15<--, 1.19<--, 1.30<--           PHYSICAL EXAM  Vital Signs Last 24 Hrs  T(C): 36.1 (01 Jul 2018 08:00), Max: 36.7 (01 Jul 2018 04:00)  T(F): 97 (01 Jul 2018 08:00), Max: 98 (01 Jul 2018 04:00)  HR: 100 (01 Jul 2018 11:00) (83 - 105)  BP: 114/72 (01 Jul 2018 10:00) (89/49 - 126/87)  BP(mean): 82 (01 Jul 2018 10:00) (57 - 94)  RR: 25 (01 Jul 2018 11:00) (14 - 32)  SpO2: 96% (01 Jul 2018 06:00) (93% - 100%)        HEENT:   Normal oral mucosa, PERRL, EOMI	  Lymphatic: No obvious lymphadenopathy , no edema  Cardiovascular: Normal S1 S2, No JVD, 1/6 ROSALINDA murmur, Peripheral pulses palpable 2+ bilaterally  Respiratory: Lungs clear to auscultation, normal effort 	  Gastrointestinal:  Soft, Non-tender, + BS	  Skin: No rashes,  No cyanosis, warm to touch  Musculoskeletal: Normal range of motion, normal strength  Psychiatry:  Appropriate Mood & affect   ext: 3+ edema b/l    TELEMETRY: 	  NSR	      ASSESSMENT/PLAN: 	62y Female with pmhx of NICM/DM/ medically non-compliant admitted with acute on chronic systolic heart failure.    -acute on chronic CHF secondary to medication non-complaince as well as dietary   -recent cardiac cath at [place] revealed nonobstructive CAD.  No need for ischemic evaluation at this time.  -consider changing to oral lasix given she in now stable and no longer decompensated HF/overloaded  -pending Ca  levels to normalize prior to D/C  -continue discussion of transplant vs. LVAD        Adele Miller PA-C

## 2018-07-01 NOTE — PROGRESS NOTE ADULT - ATTENDING COMMENTS
Agree with above assessment and plan as outlined above.    - Replete Ca+  - hold BB  - Anticipate can switch to PO torsemide in 24-48hrs    Augusto Grimes MD, FACC

## 2018-07-01 NOTE — PROGRESS NOTE ADULT - PROBLEM SELECTOR PLAN 3
- Thyroid CA s/p thyroidectomy with chronic hypocalcemia (likely secondary to resection of parathyroid gland)  - Ca2+ today is 5.9; Replete Calcium to goal of 7.5  - Given 2 doses of Calcium gluconate IV 2 grams  - C/w Calcitriol, Phoslo, Calcium carbonate, and Vit. D3  - 6/29: Ordered CMP w/ Mg & Phos.  - 6/29: Ordered PTH, Calcium, Vit. D, iron studies (given the MCV of 72). If iron is low, replete with IV iron.  - Appreciate nephrology reccs. - Thyroid CA s/p thyroidectomy with chronic hypocalcemia (likely secondary to resection of parathyroid gland)  - Ca2+ today is 7.3; Replete Calcium to goal of 7.5  - Ionized Calcium still at 0.72; give additional doses of Calcium gluconate IV 2 grams  - C/w Calcitriol, Phoslo, Calcium carbonate, and Vit. D3  - 6/29: Ordered CMP w/ Mg & Phos.  - 6/29: Ordered PTH, Calcium, Vit. D, iron studies (given the MCV of 72). If iron is low, replete with IV iron.  - Appreciate nephrology reccs.

## 2018-07-01 NOTE — PROGRESS NOTE ADULT - SUBJECTIVE AND OBJECTIVE BOX
Tele:    Overnight:    MEDICATIONS  (STANDING):  aspirin enteric coated 81 milliGRAM(s) Oral daily  calcitriol   Capsule 1 MICROGram(s) Oral daily  calcium acetate 667 milliGRAM(s) Oral three times a day with meals  calcium carbonate 1250 mG + Vitamin D (OsCal 500 + D) 1 Tablet(s) Oral three times a day  captopril 6.25 milliGRAM(s) Oral three times a day  chlorhexidine 4% Liquid 1 Application(s) Topical <User Schedule>  cholecalciferol 1000 Unit(s) Oral daily  dextrose 5%. 1000 milliLiter(s) (50 mL/Hr) IV Continuous <Continuous>  dextrose 50% Injectable 12.5 Gram(s) IV Push once  dextrose 50% Injectable 25 Gram(s) IV Push once  dextrose 50% Injectable 25 Gram(s) IV Push once  famotidine    Tablet 20 milliGRAM(s) Oral two times a day  furosemide Infusion 5 mG/Hr (2.5 mL/Hr) IV Continuous <Continuous>  gabapentin 100 milliGRAM(s) Oral every 8 hours  heparin  Injectable 5000 Unit(s) SubCutaneous every 8 hours  insulin lispro (HumaLOG) corrective regimen sliding scale   SubCutaneous three times a day before meals  insulin lispro (HumaLOG) corrective regimen sliding scale   SubCutaneous at bedtime  levothyroxine 175 MICROGram(s) Oral daily  magnesium oxide 400 milliGRAM(s) Oral two times a day with meals  milrinone Infusion 0.25 MICROgram(s)/kG/Min (7.5 mL/Hr) IV Continuous <Continuous>    MEDICATIONS  (PRN):  dextrose 40% Gel 15 Gram(s) Oral once PRN Blood Glucose LESS THAN 70 milliGRAM(s)/deciliter  glucagon  Injectable 1 milliGRAM(s) IntraMuscular once PRN Glucose LESS THAN 70 milligrams/deciliter          Vital Signs Last 24 Hrs  T(C): 36.7 (01 Jul 2018 04:00), Max: 36.7 (01 Jul 2018 04:00)  T(F): 98 (01 Jul 2018 04:00), Max: 98 (01 Jul 2018 04:00)  HR: 95 (01 Jul 2018 06:00) (83 - 105)  BP: 116/68 (01 Jul 2018 06:00) (89/49 - 126/87)  BP(mean): 79 (01 Jul 2018 06:00) (57 - 94)  RR: 16 (01 Jul 2018 06:00) (14 - 29)  SpO2: 96% (01 Jul 2018 06:00) (93% - 100%)  I&O's Detail    30 Jun 2018 07:01  -  01 Jul 2018 07:00  --------------------------------------------------------  IN:    furosemide Infusion: 55 mL    furosemide Infusion: 30 mL    IV PiggyBack: 250 mL    milrinone  Infusion: 172.5 mL    Oral Fluid: 980 mL  Total IN: 1487.5 mL    OUT:    Voided: 4550 mL  Total OUT: 4550 mL    Total NET: -3062.5 mL    PHYSICAL EXAM:  General: NAD  Cardiovascular: Normal S1 S2, No JVD, No murmurs, No edema  Respiratory: Lungs clear to auscultation	  Gastrointestinal:  Soft, Non-tender, + BS	  Skin: warm and dry, No rashes, No ecchymoses, No cyanosis	  Extremities: Normal range of motion, No clubbing, cyanosis or edema  Vascular: Peripheral pulses palpable 2+ bilaterally                            11.8   6.39  )-----------( 276      ( 01 Jul 2018 00:50 )             36.3       01 Jul 2018 00:50    134<L>  |  91<L>  |  25<H>  ----------------------------<  191<H>  3.8     |  29     |  1.27   30 Jun 2018 14:40    135    |  89<L>  |  19     ----------------------------<  151<H>  3.6     |  28     |  0.99     Ca    7.3<L>      01 Jul 2018 00:50  Ca    7.0<L>      30 Jun 2018 14:40  Phos  4.8<H>     01 Jul 2018 00:50  Phos  4.6<H>     30 Jun 2018 14:40  Mg     2.0       01 Jul 2018 00:50  Mg     2.3       30 Jun 2018 14:40    TPro  8.2    /  Alb  3.7    /  TBili  3.9<H>  /  DBili  x      /  AST  102<H>  /  ALT  80<H>  /  AlkPhos  606<H>  30 Jun 2018 14:40  TPro  7.5    /  Alb  3.5    /  TBili  3.9<H>  /  DBili  x      /  AST  104<H>  /  ALT  76<H>  /  AlkPhos  569<H>  30 Jun 2018 05:30                    Diagnostic testing: Subjective: 62F with a past medical history of hypertension, hyperlipidemia, chronic RBBB,  obesity, thyroid CA s/p thyroidectomy , nonischemic cardiomyopathy with an ejection fraction of 11% ,CHF and normal coronaries on recent cardiac catheterization at [facility] with Dr Chavarria, s/p [employer] Sofi MRI CRT-ICD placement in June 2017, multiple admission decompensated HF comes in for sxs of acute on chronic CHF (worsening EDDY, abdominal bloating, decreased UOP). She was recently admitted on 5/23/18 for cardiogenic shock and admitted to the CCU for inotrope assisted diuresis with dobutamine gtt and bumex. RHC and swan fili catheter placed for HD monitoring. CCU course complicated by acute on chronic hypocalcemia 2/2 aggressive diuresis. Endocrinology and Nephrology consulted and was discharge on PO Bumex on 6/1/18. She now presented with worsening EDDY diarrhea and abdomen bloating x 3days. As per patient she was doing  Ok until she was started on Entresto on last Friday 6/22/18. She endorse abdomen discomfort after 1st dose of entresto on Friday  so she did not take entresto on Saturday and Sunday .She restarted on Monday and since then she had multiple  non bloody loose stool, decrease  urine output  and bloated abdomen which got worse today .She had loose watery diarrhoea  x 5-6 times today .She also endorse worsening EDDY since Monday .As per patient she has chronic pedal edema which she think is better. She uses 2 pillows to sleep which is her baseline . She also stop taking metoprolol, and  hydralazine  due to low Blood Pressure. She admit to nonadherence to dietary compliance . She denies chest pain, palpitation ,diaphoresis, dizziness ,n/v ,fever or chills .  In ED Vital signs ; Blood Pressure 107/67,HR 88,RR 20,sat 100% on 2 L n/c ,temp 98F.She was found in acute on chronic decompensated HF and received lasix 80mg IVP and started on Lasix drip and milrinone for  aggressive diuresis. She was transfered to CCU for management.    NAOE. Currently, the patient reports no fevers/chills, nausea/vomiting, CP/SOB, palpitations, abdominal pain, dysuria. She also reports resolution of her sxs of cough and diarrhea.     MEDICATIONS  (STANDING):  aspirin enteric coated 81 milliGRAM(s) Oral daily  calcitriol   Capsule 1 MICROGram(s) Oral daily  calcium acetate 667 milliGRAM(s) Oral three times a day with meals  calcium carbonate 1250 mG + Vitamin D (OsCal 500 + D) 1 Tablet(s) Oral three times a day  captopril 6.25 milliGRAM(s) Oral three times a day  chlorhexidine 4% Liquid 1 Application(s) Topical <User Schedule>  cholecalciferol 1000 Unit(s) Oral daily  dextrose 5%. 1000 milliLiter(s) (50 mL/Hr) IV Continuous <Continuous>  dextrose 50% Injectable 12.5 Gram(s) IV Push once  dextrose 50% Injectable 25 Gram(s) IV Push once  dextrose 50% Injectable 25 Gram(s) IV Push once  famotidine    Tablet 20 milliGRAM(s) Oral two times a day  furosemide Infusion 5 mG/Hr (2.5 mL/Hr) IV Continuous <Continuous>  gabapentin 100 milliGRAM(s) Oral every 8 hours  heparin  Injectable 5000 Unit(s) SubCutaneous every 8 hours  insulin lispro (HumaLOG) corrective regimen sliding scale   SubCutaneous three times a day before meals  insulin lispro (HumaLOG) corrective regimen sliding scale   SubCutaneous at bedtime  levothyroxine 175 MICROGram(s) Oral daily  magnesium oxide 400 milliGRAM(s) Oral two times a day with meals  milrinone Infusion 0.25 MICROgram(s)/kG/Min (7.5 mL/Hr) IV Continuous <Continuous>    MEDICATIONS  (PRN):  dextrose 40% Gel 15 Gram(s) Oral once PRN Blood Glucose LESS THAN 70 milliGRAM(s)/deciliter  glucagon  Injectable 1 milliGRAM(s) IntraMuscular once PRN Glucose LESS THAN 70 milligrams/deciliter          Vital Signs Last 24 Hrs  T(C): 36.7 (01 Jul 2018 04:00), Max: 36.7 (01 Jul 2018 04:00)  T(F): 98 (01 Jul 2018 04:00), Max: 98 (01 Jul 2018 04:00)  HR: 95 (01 Jul 2018 06:00) (83 - 105)  BP: 116/68 (01 Jul 2018 06:00) (89/49 - 126/87)  BP(mean): 79 (01 Jul 2018 06:00) (57 - 94)  RR: 16 (01 Jul 2018 06:00) (14 - 29)  SpO2: 96% (01 Jul 2018 06:00) (93% - 100%)  I&O's Detail    30 Jun 2018 07:01  -  01 Jul 2018 07:00  --------------------------------------------------------  IN:    furosemide Infusion: 55 mL    furosemide Infusion: 30 mL    IV PiggyBack: 250 mL    milrinone  Infusion: 172.5 mL    Oral Fluid: 980 mL  Total IN: 1487.5 mL    OUT:    Voided: 4550 mL  Total OUT: 4550 mL    Total NET: -3062.5 mL    PHYSICAL EXAM:  General: NAD  Cardiovascular: Normal S1 S2, No JVD, No murmurs, No edema  Respiratory: Lungs clear to auscultation	  Gastrointestinal:  Soft, Non-tender, + BS	  Skin: warm and dry, No rashes, No ecchymoses, No cyanosis	  Extremities: Normal range of motion, No clubbing, cyanosis or edema  Vascular: Peripheral pulses palpable 2+ bilaterally                            11.8   6.39  )-----------( 276      ( 01 Jul 2018 00:50 )             36.3       01 Jul 2018 00:50    134<L>  |  91<L>  |  25<H>  ----------------------------<  191<H>  3.8     |  29     |  1.27   30 Jun 2018 14:40    135    |  89<L>  |  19     ----------------------------<  151<H>  3.6     |  28     |  0.99     Ca    7.3<L>      01 Jul 2018 00:50  Ca    7.0<L>      30 Jun 2018 14:40  Phos  4.8<H>     01 Jul 2018 00:50  Phos  4.6<H>     30 Jun 2018 14:40  Mg     2.0       01 Jul 2018 00:50  Mg     2.3       30 Jun 2018 14:40    TPro  8.2    /  Alb  3.7    /  TBili  3.9<H>  /  DBili  x      /  AST  102<H>  /  ALT  80<H>  /  AlkPhos  606<H>  30 Jun 2018 14:40  TPro  7.5    /  Alb  3.5    /  TBili  3.9<H>  /  DBili  x      /  AST  104<H>  /  ALT  76<H>  /  AlkPhos  569<H>  30 Jun 2018 05:30                    Diagnostic testing: Subjective: 62F with a past medical history of hypertension, hyperlipidemia, chronic RBBB,  obesity, thyroid CA s/p thyroidectomy , nonischemic cardiomyopathy with an ejection fraction of 11% ,CHF and normal coronaries on recent cardiac catheterization at [facility] with Dr Chavarria, s/p [employer] Sofi MRI CRT-ICD placement in June 2017, multiple admission decompensated HF comes in for sxs of acute on chronic CHF (worsening EDDY, abdominal bloating, decreased UOP). She was recently admitted on 5/23/18 for cardiogenic shock and admitted to the CCU for inotrope assisted diuresis with dobutamine gtt and bumex. RHC and swan fili catheter placed for HD monitoring. CCU course complicated by acute on chronic hypocalcemia 2/2 aggressive diuresis. Endocrinology and Nephrology consulted and was discharge on PO Bumex on 6/1/18. She now presented with worsening EDDY diarrhea and abdomen bloating x 3days. As per patient she was doing  Ok until she was started on Entresto on last Friday 6/22/18. She endorse abdomen discomfort after 1st dose of entresto on Friday  so she did not take entresto on Saturday and Sunday .She restarted on Monday and since then she had multiple  non bloody loose stool, decrease  urine output  and bloated abdomen which got worse today .She had loose watery diarrhoea  x 5-6 times today .She also endorse worsening EDDY since Monday .As per patient she has chronic pedal edema which she think is better. She uses 2 pillows to sleep which is her baseline . She also stop taking metoprolol, and  hydralazine  due to low Blood Pressure. She admit to nonadherence to dietary compliance . She denies chest pain, palpitation ,diaphoresis, dizziness ,n/v ,fever or chills .  In ED Vital signs ; Blood Pressure 107/67,HR 88,RR 20,sat 100% on 2 L n/c ,temp 98F.She was found in acute on chronic decompensated HF and received lasix 80mg IVP and started on Lasix drip and milrinone for  aggressive diuresis. She was transfered to CCU for management.    NAOE. Currently, the patient reports no fevers/chills, nausea/vomiting, CP/SOB, palpitations, abdominal pain, dysuria. She also reports resolution of her sxs of cough and diarrhea.     MEDICATIONS  (STANDING):  aspirin enteric coated 81 milliGRAM(s) Oral daily  calcitriol   Capsule 1 MICROGram(s) Oral daily  calcium acetate 667 milliGRAM(s) Oral three times a day with meals  calcium carbonate 1250 mG + Vitamin D (OsCal 500 + D) 1 Tablet(s) Oral three times a day  captopril 6.25 milliGRAM(s) Oral three times a day  chlorhexidine 4% Liquid 1 Application(s) Topical <User Schedule>  cholecalciferol 1000 Unit(s) Oral daily  dextrose 5%. 1000 milliLiter(s) (50 mL/Hr) IV Continuous <Continuous>  dextrose 50% Injectable 12.5 Gram(s) IV Push once  dextrose 50% Injectable 25 Gram(s) IV Push once  dextrose 50% Injectable 25 Gram(s) IV Push once  famotidine    Tablet 20 milliGRAM(s) Oral two times a day  furosemide Infusion 5 mG/Hr (2.5 mL/Hr) IV Continuous <Continuous>  gabapentin 100 milliGRAM(s) Oral every 8 hours  heparin  Injectable 5000 Unit(s) SubCutaneous every 8 hours  insulin lispro (HumaLOG) corrective regimen sliding scale   SubCutaneous three times a day before meals  insulin lispro (HumaLOG) corrective regimen sliding scale   SubCutaneous at bedtime  levothyroxine 175 MICROGram(s) Oral daily  magnesium oxide 400 milliGRAM(s) Oral two times a day with meals  milrinone Infusion 0.25 MICROgram(s)/kG/Min (7.5 mL/Hr) IV Continuous <Continuous>    MEDICATIONS  (PRN):  dextrose 40% Gel 15 Gram(s) Oral once PRN Blood Glucose LESS THAN 70 milliGRAM(s)/deciliter  glucagon  Injectable 1 milliGRAM(s) IntraMuscular once PRN Glucose LESS THAN 70 milligrams/deciliter          Vital Signs Last 24 Hrs  T(C): 36.7 (01 Jul 2018 04:00), Max: 36.7 (01 Jul 2018 04:00)  T(F): 98 (01 Jul 2018 04:00), Max: 98 (01 Jul 2018 04:00)  HR: 95 (01 Jul 2018 06:00) (83 - 105)  BP: 116/68 (01 Jul 2018 06:00) (89/49 - 126/87)  BP(mean): 79 (01 Jul 2018 06:00) (57 - 94)  RR: 16 (01 Jul 2018 06:00) (14 - 29)  SpO2: 96% (01 Jul 2018 06:00) (93% - 100%)  I&O's Detail    30 Jun 2018 07:01  -  01 Jul 2018 07:00  --------------------------------------------------------  IN:    furosemide Infusion: 55 mL    furosemide Infusion: 30 mL    IV PiggyBack: 250 mL    milrinone  Infusion: 172.5 mL    Oral Fluid: 980 mL  Total IN: 1487.5 mL    OUT:    Voided: 4550 mL  Total OUT: 4550 mL    Total NET: -3062.5 mL    PHYSICAL EXAM:  General: NAD  Cardiovascular: Normal S1 S2, No JVD, No murmurs, No edema  Respiratory: Lungs clear to auscultation	  Gastrointestinal:  Soft, Non-tender, + BS	  Skin: warm and dry, No rashes, No ecchymoses, No cyanosis	  Extremities: Normal range of motion, No clubbing, cyanosis or edema  Vascular: Peripheral pulses palpable 2+ bilaterally                            11.8   6.39  )-----------( 276      ( 01 Jul 2018 00:50 )             36.3       01 Jul 2018 00:50    134<L>  |  91<L>  |  25<H>  ----------------------------<  191<H>  3.8     |  29     |  1.27   30 Jun 2018 14:40    135    |  89<L>  |  19     ----------------------------<  151<H>  3.6     |  28     |  0.99     Ca    7.3<L>      01 Jul 2018 00:50  Ca    7.0<L>      30 Jun 2018 14:40  Phos  4.8<H>     01 Jul 2018 00:50  Phos  4.6<H>     30 Jun 2018 14:40  Mg     2.0       01 Jul 2018 00:50  Mg     2.3       30 Jun 2018 14:40    TPro  8.2    /  Alb  3.7    /  TBili  3.9<H>  /  DBili  x      /  AST  102<H>  /  ALT  80<H>  /  AlkPhos  606<H>  30 Jun 2018 14:40  TPro  7.5    /  Alb  3.5    /  TBili  3.9<H>  /  DBili  x      /  AST  104<H>  /  ALT  76<H>  /  AlkPhos  569<H>  30 Jun 2018 05:30                    Diagnostic testing:  < from: TTE with Doppler (w/Cont) (06.29.18 @ 10:45) >  CONCLUSIONS:  1. Mitral annular calcification, otherwise normal mitral  valve. Moderate mitral regurgitation.  2. Moderately dilated left atrium.  LA volume index = 42  cc/m2.  3. Moderate left ventricular enlargement.  4. Severe global left ventricular systolic dysfunction.  Endocardial visualization enhanced with intravenous  injection of echo contrast (Definity).  No LV thrombus  seen.  5. Moderate right atrial enlargement.  6. Right ventricular enlargement with decreased right  ventricular systolic function.  A device wire is noted in  the right heart.  7. Normal tricuspid valve.  Severe tricuspid regurgitation.  *** Compared with echocardiogram of 4/26/2018,no  significant changes noted. Subjective: 62F with a past medical history of hypertension, hyperlipidemia, chronic RBBB,  obesity, thyroid CA s/p thyroidectomy , nonischemic cardiomyopathy with an ejection fraction of 11% ,CHF and normal coronaries on recent cardiac catheterization at [facility] with Dr Chavarria, s/p [employer] Sofi MRI CRT-ICD placement in June 2017, multiple admission decompensated HF comes in for sxs of acute on chronic CHF (worsening EDDY, abdominal bloating, decreased UOP). She was recently admitted on 5/23/18 for cardiogenic shock and admitted to the CCU for inotrope assisted diuresis with dobutamine gtt and bumex. RHC and swan fili catheter placed for HD monitoring. CCU course complicated by acute on chronic hypocalcemia 2/2 aggressive diuresis. Endocrinology and Nephrology consulted and was discharge on PO Bumex on 6/1/18. She now presented with worsening EDDY diarrhea and abdomen bloating x 3days. As per patient she was doing  Ok until she was started on Entresto on last Friday 6/22/18. She endorse abdomen discomfort after 1st dose of entresto on Friday  so she did not take entresto on Saturday and Sunday .She restarted on Monday and since then she had multiple  non bloody loose stool, decrease  urine output  and bloated abdomen which got worse today .She had loose watery diarrhoea  x 5-6 times today .She also endorse worsening EDDY since Monday .As per patient she has chronic pedal edema which she think is better. She uses 2 pillows to sleep which is her baseline . She also stop taking metoprolol, and  hydralazine  due to low Blood Pressure. She admit to nonadherence to dietary compliance . She denies chest pain, palpitation ,diaphoresis, dizziness ,n/v ,fever or chills. In ED Vital signs ; Blood Pressure 107/67,HR 88,RR 20,sat 100% on 2 L n/c ,temp 98F.She was found in acute on chronic decompensated HF and received lasix 80mg IVP and started on Lasix drip and milrinone for  aggressive diuresis. She was transfered to CCU for management.    NAOE. Currently, the patient reports no fevers/chills, nausea/vomiting, CP/SOB, palpitations, abdominal pain, dysuria. She also reports resolution of her sxs of cough and diarrhea.     MEDICATIONS  (STANDING):  aspirin enteric coated 81 milliGRAM(s) Oral daily  calcitriol   Capsule 1 MICROGram(s) Oral daily  calcium acetate 667 milliGRAM(s) Oral three times a day with meals  calcium carbonate 1250 mG + Vitamin D (OsCal 500 + D) 1 Tablet(s) Oral three times a day  captopril 6.25 milliGRAM(s) Oral three times a day  chlorhexidine 4% Liquid 1 Application(s) Topical <User Schedule>  cholecalciferol 1000 Unit(s) Oral daily  dextrose 5%. 1000 milliLiter(s) (50 mL/Hr) IV Continuous <Continuous>  dextrose 50% Injectable 12.5 Gram(s) IV Push once  dextrose 50% Injectable 25 Gram(s) IV Push once  dextrose 50% Injectable 25 Gram(s) IV Push once  famotidine    Tablet 20 milliGRAM(s) Oral two times a day  furosemide Infusion 5 mG/Hr (2.5 mL/Hr) IV Continuous <Continuous>  gabapentin 100 milliGRAM(s) Oral every 8 hours  heparin  Injectable 5000 Unit(s) SubCutaneous every 8 hours  insulin lispro (HumaLOG) corrective regimen sliding scale   SubCutaneous three times a day before meals  insulin lispro (HumaLOG) corrective regimen sliding scale   SubCutaneous at bedtime  levothyroxine 175 MICROGram(s) Oral daily  magnesium oxide 400 milliGRAM(s) Oral two times a day with meals  milrinone Infusion 0.25 MICROgram(s)/kG/Min (7.5 mL/Hr) IV Continuous <Continuous>    MEDICATIONS  (PRN):  dextrose 40% Gel 15 Gram(s) Oral once PRN Blood Glucose LESS THAN 70 milliGRAM(s)/deciliter  glucagon  Injectable 1 milliGRAM(s) IntraMuscular once PRN Glucose LESS THAN 70 milligrams/deciliter          Vital Signs Last 24 Hrs  T(C): 36.7 (01 Jul 2018 04:00), Max: 36.7 (01 Jul 2018 04:00)  T(F): 98 (01 Jul 2018 04:00), Max: 98 (01 Jul 2018 04:00)  HR: 95 (01 Jul 2018 06:00) (83 - 105)  BP: 116/68 (01 Jul 2018 06:00) (89/49 - 126/87)  BP(mean): 79 (01 Jul 2018 06:00) (57 - 94)  RR: 16 (01 Jul 2018 06:00) (14 - 29)  SpO2: 96% (01 Jul 2018 06:00) (93% - 100%)  I&O's Detail    30 Jun 2018 07:01  -  01 Jul 2018 07:00  --------------------------------------------------------  IN:    furosemide Infusion: 55 mL    furosemide Infusion: 30 mL    IV PiggyBack: 250 mL    milrinone  Infusion: 172.5 mL    Oral Fluid: 980 mL  Total IN: 1487.5 mL    OUT:    Voided: 4550 mL  Total OUT: 4550 mL    Total NET: -3062.5 mL    PHYSICAL EXAM:  General: NAD  Cardiovascular: Normal S1 S2, No JVD, No murmurs, No edema  Respiratory: Lungs clear to auscultation	  Gastrointestinal:  Soft, Non-tender, + BS	  Skin: warm and dry, No rashes, No ecchymoses, No cyanosis	  Extremities: Normal range of motion, No clubbing, cyanosis or edema  Vascular: Peripheral pulses palpable 2+ bilaterally                            11.8   6.39  )-----------( 276      ( 01 Jul 2018 00:50 )             36.3       01 Jul 2018 00:50    134<L>  |  91<L>  |  25<H>  ----------------------------<  191<H>  3.8     |  29     |  1.27   30 Jun 2018 14:40    135    |  89<L>  |  19     ----------------------------<  151<H>  3.6     |  28     |  0.99     Ca    7.3<L>      01 Jul 2018 00:50  Ca    7.0<L>      30 Jun 2018 14:40  Phos  4.8<H>     01 Jul 2018 00:50  Phos  4.6<H>     30 Jun 2018 14:40  Mg     2.0       01 Jul 2018 00:50  Mg     2.3       30 Jun 2018 14:40    TPro  8.2    /  Alb  3.7    /  TBili  3.9<H>  /  DBili  x      /  AST  102<H>  /  ALT  80<H>  /  AlkPhos  606<H>  30 Jun 2018 14:40  TPro  7.5    /  Alb  3.5    /  TBili  3.9<H>  /  DBili  x      /  AST  104<H>  /  ALT  76<H>  /  AlkPhos  569<H>  30 Jun 2018 05:30                    Diagnostic testing:  < from: TTE with Doppler (w/Cont) (06.29.18 @ 10:45) >  CONCLUSIONS:  1. Mitral annular calcification, otherwise normal mitral  valve. Moderate mitral regurgitation.  2. Moderately dilated left atrium.  LA volume index = 42  cc/m2.  3. Moderate left ventricular enlargement.  4. Severe global left ventricular systolic dysfunction.  Endocardial visualization enhanced with intravenous  injection of echo contrast (Definity).  No LV thrombus  seen.  5. Moderate right atrial enlargement.  6. Right ventricular enlargement with decreased right  ventricular systolic function.  A device wire is noted in  the right heart.  7. Normal tricuspid valve.  Severe tricuspid regurgitation.  *** Compared with echocardiogram of 4/26/2018,no  significant changes noted.

## 2018-07-02 VITALS — RESPIRATION RATE: 18 BRPM | HEART RATE: 91 BPM

## 2018-07-02 LAB
BUN SERPL-MCNC: 18 MG/DL — SIGNIFICANT CHANGE UP (ref 7–23)
CA-I BLD-SCNC: 0.85 MMOL/L — LOW (ref 1.03–1.23)
CALCIUM SERPL-MCNC: 7.9 MG/DL — LOW (ref 8.4–10.5)
CHLORIDE SERPL-SCNC: 93 MMOL/L — LOW (ref 98–107)
CO2 SERPL-SCNC: 27 MMOL/L — SIGNIFICANT CHANGE UP (ref 22–31)
CREAT SERPL-MCNC: 0.88 MG/DL — SIGNIFICANT CHANGE UP (ref 0.5–1.3)
GLUCOSE BLDC GLUCOMTR-MCNC: 105 MG/DL — HIGH (ref 70–99)
GLUCOSE SERPL-MCNC: 81 MG/DL — SIGNIFICANT CHANGE UP (ref 70–99)
HCT VFR BLD CALC: 37.9 % — SIGNIFICANT CHANGE UP (ref 34.5–45)
HGB BLD-MCNC: 12 G/DL — SIGNIFICANT CHANGE UP (ref 11.5–15.5)
MCHC RBC-ENTMCNC: 23.4 PG — LOW (ref 27–34)
MCHC RBC-ENTMCNC: 31.7 % — LOW (ref 32–36)
MCV RBC AUTO: 74 FL — LOW (ref 80–100)
NRBC # FLD: 0 — SIGNIFICANT CHANGE UP
PLATELET # BLD AUTO: 300 K/UL — SIGNIFICANT CHANGE UP (ref 150–400)
PMV BLD: 9.9 FL — SIGNIFICANT CHANGE UP (ref 7–13)
POTASSIUM SERPL-MCNC: 3.6 MMOL/L — SIGNIFICANT CHANGE UP (ref 3.5–5.3)
POTASSIUM SERPL-SCNC: 3.6 MMOL/L — SIGNIFICANT CHANGE UP (ref 3.5–5.3)
RBC # BLD: 5.12 M/UL — SIGNIFICANT CHANGE UP (ref 3.8–5.2)
RBC # FLD: 25.1 % — HIGH (ref 10.3–14.5)
SODIUM SERPL-SCNC: 134 MMOL/L — LOW (ref 135–145)
WBC # BLD: 5.36 K/UL — SIGNIFICANT CHANGE UP (ref 3.8–10.5)
WBC # FLD AUTO: 5.36 K/UL — SIGNIFICANT CHANGE UP (ref 3.8–10.5)

## 2018-07-02 PROCEDURE — 99233 SBSQ HOSP IP/OBS HIGH 50: CPT

## 2018-07-02 RX ORDER — CALCITRIOL 0.5 UG/1
1 CAPSULE ORAL
Qty: 0 | Refills: 0 | COMMUNITY
Start: 2018-07-02

## 2018-07-02 RX ORDER — GABAPENTIN 400 MG/1
1 CAPSULE ORAL
Qty: 90 | Refills: 0 | OUTPATIENT
Start: 2018-07-02 | End: 2018-07-31

## 2018-07-02 RX ORDER — CALCIUM ACETATE 667 MG
1 TABLET ORAL
Qty: 90 | Refills: 0 | OUTPATIENT
Start: 2018-07-02 | End: 2018-07-31

## 2018-07-02 RX ORDER — POTASSIUM CHLORIDE 20 MEQ
1 PACKET (EA) ORAL
Qty: 30 | Refills: 2 | OUTPATIENT
Start: 2018-07-02 | End: 2018-09-29

## 2018-07-02 RX ORDER — SACUBITRIL AND VALSARTAN 24; 26 MG/1; MG/1
1 TABLET, FILM COATED ORAL
Qty: 60 | Refills: 0 | OUTPATIENT
Start: 2018-07-02 | End: 2018-07-31

## 2018-07-02 RX ORDER — CALCITRIOL 0.5 UG/1
1 CAPSULE ORAL
Qty: 30 | Refills: 0 | OUTPATIENT
Start: 2018-07-02 | End: 2018-07-31

## 2018-07-02 RX ORDER — VALSARTAN 80 MG/1
0.5 TABLET ORAL
Qty: 3 | Refills: 0 | OUTPATIENT
Start: 2018-07-02 | End: 2018-07-04

## 2018-07-02 RX ORDER — FAMOTIDINE 10 MG/ML
1 INJECTION INTRAVENOUS
Qty: 60 | Refills: 0 | OUTPATIENT
Start: 2018-07-02 | End: 2018-07-31

## 2018-07-02 RX ORDER — ASPIRIN/CALCIUM CARB/MAGNESIUM 324 MG
1 TABLET ORAL
Qty: 30 | Refills: 1 | OUTPATIENT
Start: 2018-07-02

## 2018-07-02 RX ORDER — SPIRONOLACTONE 25 MG/1
1 TABLET, FILM COATED ORAL
Qty: 30 | Refills: 0 | OUTPATIENT
Start: 2018-07-02 | End: 2018-07-31

## 2018-07-02 RX ORDER — CALCIUM ACETATE 667 MG
1 TABLET ORAL
Qty: 0 | Refills: 0 | COMMUNITY
Start: 2018-07-02

## 2018-07-02 RX ORDER — METOPROLOL TARTRATE 50 MG
1 TABLET ORAL
Qty: 30 | Refills: 0 | OUTPATIENT
Start: 2018-07-02 | End: 2018-07-31

## 2018-07-02 RX ORDER — ASPIRIN/CALCIUM CARB/MAGNESIUM 324 MG
1 TABLET ORAL
Qty: 30 | Refills: 1 | OUTPATIENT
Start: 2018-07-02 | End: 2018-08-30

## 2018-07-02 RX ORDER — FAMOTIDINE 10 MG/ML
1 INJECTION INTRAVENOUS
Qty: 0 | Refills: 0 | COMMUNITY

## 2018-07-02 RX ORDER — CALCIUM ACETATE 667 MG
0 TABLET ORAL
Qty: 0 | Refills: 0 | COMMUNITY
Start: 2018-07-02

## 2018-07-02 RX ORDER — POTASSIUM CHLORIDE 20 MEQ
40 PACKET (EA) ORAL
Qty: 0 | Refills: 0 | COMMUNITY

## 2018-07-02 RX ORDER — CHOLECALCIFEROL (VITAMIN D3) 125 MCG
1 CAPSULE ORAL
Qty: 30 | Refills: 0 | OUTPATIENT
Start: 2018-07-02 | End: 2018-07-31

## 2018-07-02 RX ADMIN — Medication 60 MILLIGRAM(S): at 05:54

## 2018-07-02 RX ADMIN — Medication 1 TABLET(S): at 12:13

## 2018-07-02 RX ADMIN — Medication 81 MILLIGRAM(S): at 12:08

## 2018-07-02 RX ADMIN — FAMOTIDINE 20 MILLIGRAM(S): 10 INJECTION INTRAVENOUS at 05:54

## 2018-07-02 RX ADMIN — GABAPENTIN 100 MILLIGRAM(S): 400 CAPSULE ORAL at 05:54

## 2018-07-02 RX ADMIN — CHLORHEXIDINE GLUCONATE 1 APPLICATION(S): 213 SOLUTION TOPICAL at 10:48

## 2018-07-02 RX ADMIN — Medication 1000 UNIT(S): at 12:13

## 2018-07-02 RX ADMIN — Medication 175 MICROGRAM(S): at 05:54

## 2018-07-02 RX ADMIN — CAPTOPRIL 6.25 MILLIGRAM(S): 12.5 TABLET ORAL at 05:55

## 2018-07-02 RX ADMIN — Medication 1 TABLET(S): at 05:54

## 2018-07-02 RX ADMIN — MAGNESIUM OXIDE 400 MG ORAL TABLET 400 MILLIGRAM(S): 241.3 TABLET ORAL at 09:38

## 2018-07-02 RX ADMIN — Medication 667 MILLIGRAM(S): at 12:09

## 2018-07-02 RX ADMIN — CALCITRIOL 1 MICROGRAM(S): 0.5 CAPSULE ORAL at 12:09

## 2018-07-02 RX ADMIN — Medication 667 MILLIGRAM(S): at 10:49

## 2018-07-02 RX ADMIN — GABAPENTIN 100 MILLIGRAM(S): 400 CAPSULE ORAL at 14:07

## 2018-07-02 NOTE — PROGRESS NOTE ADULT - ATTENDING COMMENTS
patient with acute on chronic systolic heart failure now improved  continue diuretic  medical and dietary compliance encouraged  d/c planning

## 2018-07-02 NOTE — PROGRESS NOTE ADULT - SUBJECTIVE AND OBJECTIVE BOX
Subjective:  Patient seen  in CCU this morning walking in her room  doing well. Denies complaints  Denies chest pain/pleuritc chest pain,  dyspnea, dizziness or lightheadedness. No PND/Orthopnea      MEDICATIONS  (STANDING):  aspirin enteric coated 81 milliGRAM(s) Oral daily  calcitriol   Capsule 1 MICROGram(s) Oral daily  calcium acetate 667 milliGRAM(s) Oral three times a day with meals  calcium carbonate 1250 mG + Vitamin D (OsCal 500 + D) 1 Tablet(s) Oral three times a day  captopril 6.25 milliGRAM(s) Oral three times a day  chlorhexidine 4% Liquid 1 Application(s) Topical <User Schedule>  cholecalciferol 1000 Unit(s) Oral daily  dextrose 5%. 1000 milliLiter(s) (50 mL/Hr) IV Continuous <Continuous>  dextrose 50% Injectable 12.5 Gram(s) IV Push once  dextrose 50% Injectable 25 Gram(s) IV Push once  dextrose 50% Injectable 25 Gram(s) IV Push once  famotidine    Tablet 20 milliGRAM(s) Oral two times a day  furosemide   Injectable 60 milliGRAM(s) IV Push two times a day  gabapentin 100 milliGRAM(s) Oral every 8 hours  heparin  Injectable 5000 Unit(s) SubCutaneous every 8 hours  insulin lispro (HumaLOG) corrective regimen sliding scale   SubCutaneous three times a day before meals  insulin lispro (HumaLOG) corrective regimen sliding scale   SubCutaneous at bedtime  levothyroxine 175 MICROGram(s) Oral daily  magnesium oxide 400 milliGRAM(s) Oral two times a day with meals    MEDICATIONS  (PRN):  dextrose 40% Gel 15 Gram(s) Oral once PRN Blood Glucose LESS THAN 70 milliGRAM(s)/deciliter  glucagon  Injectable 1 milliGRAM(s) IntraMuscular once PRN Glucose LESS THAN 70 milligrams/deciliter      LABS:                        12.0   5.36  )-----------( 300      ( 02 Jul 2018 04:40 )             37.9     Hemoglobin: 12.0 g/dL (07-02 @ 04:40)  Hemoglobin: 11.8 g/dL (07-01 @ 00:50)  Hemoglobin: 11.6 g/dL (06-30 @ 05:30)  Hemoglobin: 10.7 g/dL (06-29 @ 03:30)  Hemoglobin: 11.7 g/dL (06-28 @ 17:55)    07-02    134<L>  |  93<L>  |  18  ----------------------------<  81  3.6   |  27  |  0.88    Ca    7.9<L>      02 Jul 2018 04:40  Phos  4.8     07-01  Mg     2.0     07-01    TPro  8.2  /  Alb  3.7  /  TBili  3.9<H>  /  DBili  x   /  AST  102<H>  /  ALT  80<H>  /  AlkPhos  606<H>  06-30    Creatinine Trend: 0.88<--, 1.27<--, 0.99<--, 0.98<--, 1.15<--, 1.19<--       PHYSICAL EXAM  Vital Signs Last 24 Hrs  T(C): 36.5 (02 Jul 2018 08:13), Max: 37 (02 Jul 2018 00:00)  T(F): 97.7 (02 Jul 2018 08:13), Max: 98.6 (02 Jul 2018 00:00)  HR: 93 (02 Jul 2018 08:01) (82 - 108)  BP: 97/68 (02 Jul 2018 07:00) (93/62 - 119/76)  BP(mean): 75 (02 Jul 2018 07:00) (53 - 88)  RR: 18 (02 Jul 2018 08:01) (11 - 25)  SpO2: 98% (02 Jul 2018 08:01) (95% - 100%)        HEENT:   Normal oral mucosa, PERRL, EOMI	  Lymphatic: No obvious lymphadenopathy , no edema  Cardiovascular: Normal S1 S2, No JVD, 1/6 ROSALINDA murmur, Peripheral pulses palpable 2+ bilaterally  Respiratory: Lungs clear to auscultation, normal effort 	  Gastrointestinal:  Soft, Non-tender, + BS	  Skin: No rashes,  No cyanosis, warm to touch  Musculoskeletal: Normal range of motion, normal strength  Psychiatry:  Appropriate Mood & affect   ext: 3+ edema b/l    TELEMETRY: 	  NSR	      ASSESSMENT/PLAN: 	62y Female with pmhx of NICM/DM/ medically non-compliant admitted with acute on chronic systolic heart failure.    -acute on chronic CHF secondary to medication non-compliance as well as dietary   -recent cardiac cath at [facility] revealed nonobstructive CAD.  No need for ischemic evaluation at this time.  -Ca levels improved, to be d/c on oral supplementation  -As per Dr. Obrien,  captopril to d/c today prior to discharge and will d/c on valsartan for today and tomorrow and Entresto to be restarted on Wednesday  -Has F/u appt with Dr. Robbins( cardiologist) tomorrow  -continue discussion of transplant vs. LVAD  -plan to d/c today        Adele Miller PA-C

## 2018-07-02 NOTE — PROGRESS NOTE ADULT - PROVIDER SPECIALTY LIST ADULT
CCU
CCU
Cardiology
Electrophysiology
Heart Failure
Intervent Cardiology
Nephrology
Nephrology
CCU

## 2018-07-02 NOTE — PROGRESS NOTE ADULT - SUBJECTIVE AND OBJECTIVE BOX
Date of Admission:    24H hour events:   Pt seen at bedside. Denies CP, SOB. Desires to go home.     MEDICATIONS:  aspirin enteric coated 81 milliGRAM(s) Oral daily  furosemide   Injectable 60 milliGRAM(s) IV Push two times a day        gabapentin 100 milliGRAM(s) Oral every 8 hours    famotidine    Tablet 20 milliGRAM(s) Oral two times a day    dextrose 40% Gel 15 Gram(s) Oral once PRN  dextrose 50% Injectable 12.5 Gram(s) IV Push once  dextrose 50% Injectable 25 Gram(s) IV Push once  dextrose 50% Injectable 25 Gram(s) IV Push once  glucagon  Injectable 1 milliGRAM(s) IntraMuscular once PRN  levothyroxine 175 MICROGram(s) Oral daily    calcitriol   Capsule 1 MICROGram(s) Oral daily  calcium acetate 667 milliGRAM(s) Oral three times a day with meals  calcium carbonate 1250 mG + Vitamin D (OsCal 500 + D) 1 Tablet(s) Oral three times a day  cholecalciferol 1000 Unit(s) Oral daily  dextrose 5%. 1000 milliLiter(s) IV Continuous <Continuous>  magnesium oxide 400 milliGRAM(s) Oral two times a day with meals      REVIEW OF SYSTEMS:  General: no fatigue/malaise, weight loss/gain.  Skin: no rashes.  Ophthalmologic: no blurred vision, no loss of vision. 	  ENT: no sore throat, rhinorrhea, sinus congestion.  Respiratory: no SOB, cough or wheeze.  Gastrointestinal:  no N/V/D, no melena/hematemesis/hematochezia.  Genitourinary: no dysuria/hesitancy or hematuria.  Musculoskeletal: no myalgias or arthralgias.  Neurological: no changes in vision or hearing, no lightheadedness/dizziness, no syncope/near syncope	  Psychiatric: no unusual stress/anxiety.   Hematology/Lymphatics: no unusual bleeding, bruising and no lymphadenopathy.  Endocrine: no unusual thirst.   All others negative except as stated above and in HPI.    PHYSICAL EXAM:  T(C): 36.5 (07-02-18 @ 08:13), Max: 37 (07-02-18 @ 00:00)  HR: 94 (07-02-18 @ 10:00) (82 - 108)  BP: 91/73 (07-02-18 @ 10:00) (91/73 - 119/76)  RR: 30 (07-02-18 @ 10:00) (11 - 30)  SpO2: 98% (07-02-18 @ 09:00) (95% - 100%)  Wt(kg): --  I&O's Summary    01 Jul 2018 07:01  -  02 Jul 2018 07:00  --------------------------------------------------------  IN: 450.5 mL / OUT: 3050 mL / NET: -2599.5 mL        Appearance: Normal	  HEENT:   Normal oral mucosa, PERRL, EOMI	  Lymphatic: No lymphadenopathy  Cardiovascular: Normal S1 S2, No JVD, No murmurs  Respiratory: Lungs clear to auscultation	  Psychiatry: A & O x 3, Mood & affect appropriate  Gastrointestinal:  Soft, Non-tender, + BS	  Skin: No rashes, No ecchymoses, No cyanosis	  Neurologic: Non-focal  Extremities: Normal range of motion, No clubbing, cyanosis + bilateral edema  Vascular: Peripheral pulses palpable 2+ bilaterally        LABS:	 	    CBC Full  -  ( 02 Jul 2018 04:40 )  WBC Count : 5.36 K/uL  Hemoglobin : 12.0 g/dL  Hematocrit : 37.9 %  Platelet Count - Automated : 300 K/uL  Mean Cell Volume : 74.0 fL  Mean Cell Hemoglobin : 23.4 pg  Mean Cell Hemoglobin Concentration : 31.7 %  Auto Neutrophil # : x  Auto Lymphocyte # : x  Auto Monocyte # : x  Auto Eosinophil # : x  Auto Basophil # : x  Auto Neutrophil % : x  Auto Lymphocyte % : x  Auto Monocyte % : x  Auto Eosinophil % : x  Auto Basophil % : x    07-02    134<L>  |  93<L>  |  18  ----------------------------<  81  3.6   |  27  |  0.88  07-01    134<L>  |  91<L>  |  25<H>  ----------------------------<  191<H>  3.8   |  29  |  1.27    Ca    7.9<L>      02 Jul 2018 04:40  Ca    7.3<L>      01 Jul 2018 00:50  Phos  4.8     07-01  Phos  4.6     06-30  Mg     2.0     07-01  Mg     2.3     06-30    TPro  8.2  /  Alb  3.7  /  TBili  3.9<H>  /  DBili  x   /  AST  102<H>  /  ALT  80<H>  /  AlkPhos  606<H>  06-30      proBNP: Serum Pro-Brain Natriuretic Peptide: 1795 pg/mL (06-29-18 @ 03:30)  Serum Pro-Brain Natriuretic Peptide: 2348 pg/mL (06-28-18 @ 17:55)

## 2018-07-02 NOTE — PROGRESS NOTE ADULT - ASSESSMENT
62 year old woman PMH HTN, HLD, DM II, thyroid cancer, chronic systolic heart failure (LVEF 10%, LVIDd 6.6 cm), first diagnosed in 2013, CRT-ICD placement in June 2017. Recent admission to [facility] 5/23/18-6/1/18 for acute on chronic systolic HF complicated by cardiogenic shock requiring IV inotropic support and episodes of NSVT. RHC on 5/25/18 which showed RA of 28, PCWP 37, PA 57/39/46 , EDP 23, PA sat 34.7%, CO 2.72, CI 1.21. She was offered a transfer to [facility] for consideration in LVAD, but patient refused. She was eventually weaned off inotrope.    She was seen in [facility] clinic on 6/18/18 and was in ADHF, was told she should be admitted for treatment, but patient refused admission. She has had multiple admissions for heart failure over the last year at various hospitals. She comes in w/ complaints of diarrhea, abdominal bloating and decrease urination s/p initiating Entresto.     1. Acute on chronic HF exacerbation - currently clinically improved   - pt tolerated captopril, to be initialed on valsartan 20 bid   - plan to bridge to entresto on Wed   - increase spironolactone to 12.5 bid   - start torsemide 40 po bid   - cont toprol XL 25 qd   - follow up next week as outpt     Kylie Amaya MD

## 2018-07-02 NOTE — PROGRESS NOTE ADULT - SUBJECTIVE AND OBJECTIVE BOX
Patient denies CP, Breathing comfortably Review of systems otherwise (-)    aspirin enteric coated 81 milliGRAM(s) Oral daily  calcitriol   Capsule 1 MICROGram(s) Oral daily  calcium acetate 667 milliGRAM(s) Oral three times a day with meals  calcium carbonate 1250 mG + Vitamin D (OsCal 500 + D) 1 Tablet(s) Oral three times a day  cholecalciferol 1000 Unit(s) Oral daily  dextrose 40% Gel 15 Gram(s) Oral once PRN  dextrose 5%. 1000 milliLiter(s) IV Continuous <Continuous>  dextrose 50% Injectable 12.5 Gram(s) IV Push once  dextrose 50% Injectable 25 Gram(s) IV Push once  dextrose 50% Injectable 25 Gram(s) IV Push once  famotidine    Tablet 20 milliGRAM(s) Oral two times a day  furosemide   Injectable 60 milliGRAM(s) IV Push two times a day  gabapentin 100 milliGRAM(s) Oral every 8 hours  glucagon  Injectable 1 milliGRAM(s) IntraMuscular once PRN  levothyroxine 175 MICROGram(s) Oral daily  magnesium oxide 400 milliGRAM(s) Oral two times a day with meals                            12.0   5.36  )-----------( 300      ( 02 Jul 2018 04:40 )             37.9       Hemoglobin: 12.0 g/dL (07-02 @ 04:40)  Hemoglobin: 11.8 g/dL (07-01 @ 00:50)  Hemoglobin: 11.6 g/dL (06-30 @ 05:30)  Hemoglobin: 10.7 g/dL (06-29 @ 03:30)  Hemoglobin: 11.7 g/dL (06-28 @ 17:55)      07-02    134<L>  |  93<L>  |  18  ----------------------------<  81  3.6   |  27  |  0.88    Ca    7.9<L>      02 Jul 2018 04:40  Phos  4.8     07-01  Mg     2.0     07-01    TPro  8.2  /  Alb  3.7  /  TBili  3.9<H>  /  DBili  x   /  AST  102<H>  /  ALT  80<H>  /  AlkPhos  606<H>  06-30    Creatinine Trend: 0.88<--, 1.27<--, 0.99<--, 0.98<--, 1.15<--, 1.19<--    COAGS:           T(C): 36.5 (07-02-18 @ 08:13), Max: 37 (07-02-18 @ 00:00)  HR: 94 (07-02-18 @ 10:00) (82 - 108)  BP: 91/73 (07-02-18 @ 10:00) (91/73 - 119/76)  RR: 30 (07-02-18 @ 10:00) (11 - 30)  SpO2: 98% (07-02-18 @ 09:00) (95% - 100%)  Wt(kg): --    I&O's Summary    01 Jul 2018 07:01  -  02 Jul 2018 07:00  --------------------------------------------------------  IN: 450.5 mL / OUT: 3050 mL / NET: -2599.5 mL        Gen: NAD  HEENT:  (-)icterus (-)pallor  CV: N S1 S2 1/6 ROSALINDA (+)2 Pulses B/l  Resp:  Clear to ausculatation B/L, normal effort  GI: (+) BS Soft, NT, ND  Lymph:  (-)Edema, (-)obvious lymphadenopathy  Skin: Warm to touch, Normal turgor  Psych: Appropriate mood and affect    TELEMETRY: 	  sinus/ VPACED  	      ASSESSMENT/PLAN: 	62y Female severe NICM (LVEF 10-15%) who had a [employer] Biv-ICD implanted at [place]. A recent LHC at [facility] was also unremarkable. She now returns for with a severe decompensation of her NICM (JVP > 20, hepatic congestion, abdominal pain, LE edema and a near 20 lb weight gain).    -  clinically much improved, at dry weight  - change to torsemide 40 mg PO BID  - calcium improved   - Start low dose ARB    Augusto Grimes MD, FACC

## 2018-07-05 RX ORDER — SACUBITRIL AND VALSARTAN 24; 26 MG/1; MG/1
1 TABLET, FILM COATED ORAL
Qty: 60 | Refills: 0 | OUTPATIENT
Start: 2018-07-05 | End: 2018-08-03

## 2018-07-09 ENCOUNTER — APPOINTMENT (OUTPATIENT)
Dept: CARDIOLOGY | Facility: CLINIC | Age: 63
End: 2018-07-09

## 2018-08-02 NOTE — ED PROVIDER NOTE - PROGRESS NOTE DETAILS
House Cards consulted, will see patient. Atherosclerosis of coronary artery Admitted to CCU under Dr. Grimes.

## 2018-08-20 ENCOUNTER — INPATIENT (INPATIENT)
Facility: HOSPITAL | Age: 63
LOS: 7 days | Discharge: LEFT BEFORE DISCHARGE | End: 2018-08-28
Attending: INTERNAL MEDICINE | Admitting: INTERNAL MEDICINE
Payer: COMMERCIAL

## 2018-08-20 VITALS
HEART RATE: 94 BPM | TEMPERATURE: 98 F | SYSTOLIC BLOOD PRESSURE: 100 MMHG | DIASTOLIC BLOOD PRESSURE: 71 MMHG | RESPIRATION RATE: 20 BRPM | OXYGEN SATURATION: 100 %

## 2018-08-20 DIAGNOSIS — I50.9 HEART FAILURE, UNSPECIFIED: ICD-10-CM

## 2018-08-20 LAB
ALBUMIN SERPL ELPH-MCNC: 3.5 G/DL — SIGNIFICANT CHANGE UP (ref 3.3–5)
ALP SERPL-CCNC: 420 U/L — HIGH (ref 40–120)
ALT FLD-CCNC: 33 U/L — SIGNIFICANT CHANGE UP (ref 4–33)
ANISOCYTOSIS BLD QL: SLIGHT — SIGNIFICANT CHANGE UP
AST SERPL-CCNC: 80 U/L — HIGH (ref 4–32)
BASOPHILS # BLD AUTO: 0.04 K/UL — SIGNIFICANT CHANGE UP (ref 0–0.2)
BASOPHILS NFR BLD AUTO: 0.5 % — SIGNIFICANT CHANGE UP (ref 0–2)
BASOPHILS NFR SPEC: 0.9 % — SIGNIFICANT CHANGE UP (ref 0–2)
BILIRUB SERPL-MCNC: 13.2 MG/DL — HIGH (ref 0.2–1.2)
BLASTS # FLD: 0 % — SIGNIFICANT CHANGE UP (ref 0–0)
BUN SERPL-MCNC: 51 MG/DL — HIGH (ref 7–23)
CALCIUM SERPL-MCNC: 6.3 MG/DL — CRITICAL LOW (ref 8.4–10.5)
CHLORIDE SERPL-SCNC: 76 MMOL/L — LOW (ref 98–107)
CK MB BLD-MCNC: 0.4 — SIGNIFICANT CHANGE UP (ref 0–2.5)
CK MB BLD-MCNC: 4.54 NG/ML — SIGNIFICANT CHANGE UP (ref 1–4.7)
CK MB BLD-MCNC: 4.71 NG/ML — HIGH (ref 1–4.7)
CK SERPL-CCNC: 1022 U/L — HIGH (ref 25–170)
CK SERPL-CCNC: 1026 U/L — HIGH (ref 25–170)
CO2 SERPL-SCNC: 26 MMOL/L — SIGNIFICANT CHANGE UP (ref 22–31)
CREAT SERPL-MCNC: 1.78 MG/DL — HIGH (ref 0.5–1.3)
EOSINOPHIL # BLD AUTO: 0.03 K/UL — SIGNIFICANT CHANGE UP (ref 0–0.5)
EOSINOPHIL NFR BLD AUTO: 0.4 % — SIGNIFICANT CHANGE UP (ref 0–6)
EOSINOPHIL NFR FLD: 0 % — SIGNIFICANT CHANGE UP (ref 0–6)
GIANT PLATELETS BLD QL SMEAR: PRESENT — SIGNIFICANT CHANGE UP
GLUCOSE SERPL-MCNC: 188 MG/DL — HIGH (ref 70–99)
HCT VFR BLD CALC: 33.8 % — LOW (ref 34.5–45)
HGB BLD-MCNC: 11.4 G/DL — LOW (ref 11.5–15.5)
IMM GRANULOCYTES # BLD AUTO: 0.07 # — SIGNIFICANT CHANGE UP
IMM GRANULOCYTES NFR BLD AUTO: 0.9 % — SIGNIFICANT CHANGE UP (ref 0–1.5)
LYMPHOCYTES # BLD AUTO: 1.33 K/UL — SIGNIFICANT CHANGE UP (ref 1–3.3)
LYMPHOCYTES # BLD AUTO: 17 % — SIGNIFICANT CHANGE UP (ref 13–44)
LYMPHOCYTES NFR SPEC AUTO: 16.1 % — SIGNIFICANT CHANGE UP (ref 13–44)
MACROCYTES BLD QL: SLIGHT — SIGNIFICANT CHANGE UP
MAGNESIUM SERPL-MCNC: 1.7 MG/DL — SIGNIFICANT CHANGE UP (ref 1.6–2.6)
MCHC RBC-ENTMCNC: 23.1 PG — LOW (ref 27–34)
MCHC RBC-ENTMCNC: 33.7 % — SIGNIFICANT CHANGE UP (ref 32–36)
MCV RBC AUTO: 68.6 FL — LOW (ref 80–100)
METAMYELOCYTES # FLD: 0 % — SIGNIFICANT CHANGE UP (ref 0–1)
MONOCYTES # BLD AUTO: 1.03 K/UL — HIGH (ref 0–0.9)
MONOCYTES NFR BLD AUTO: 13.2 % — SIGNIFICANT CHANGE UP (ref 2–14)
MONOCYTES NFR BLD: 6.2 % — SIGNIFICANT CHANGE UP (ref 2–9)
MYELOCYTES NFR BLD: 0 % — SIGNIFICANT CHANGE UP (ref 0–0)
NEUTROPHIL AB SER-ACNC: 71.4 % — SIGNIFICANT CHANGE UP (ref 43–77)
NEUTROPHILS # BLD AUTO: 5.33 K/UL — SIGNIFICANT CHANGE UP (ref 1.8–7.4)
NEUTROPHILS NFR BLD AUTO: 68 % — SIGNIFICANT CHANGE UP (ref 43–77)
NEUTS BAND # BLD: 0.9 % — SIGNIFICANT CHANGE UP (ref 0–6)
NRBC # FLD: 0.05 — SIGNIFICANT CHANGE UP
NT-PROBNP SERPL-SCNC: 2262 PG/ML — SIGNIFICANT CHANGE UP
OTHER - HEMATOLOGY %: 0 — SIGNIFICANT CHANGE UP
PHOSPHATE SERPL-MCNC: 5.4 MG/DL — HIGH (ref 2.5–4.5)
PLATELET # BLD AUTO: 239 K/UL — SIGNIFICANT CHANGE UP (ref 150–400)
PLATELET COUNT - ESTIMATE: NORMAL — SIGNIFICANT CHANGE UP
PMV BLD: SIGNIFICANT CHANGE UP FL (ref 7–13)
POIKILOCYTOSIS BLD QL AUTO: SLIGHT — SIGNIFICANT CHANGE UP
POTASSIUM SERPL-MCNC: 4.7 MMOL/L — SIGNIFICANT CHANGE UP (ref 3.5–5.3)
POTASSIUM SERPL-SCNC: 4.7 MMOL/L — SIGNIFICANT CHANGE UP (ref 3.5–5.3)
PROMYELOCYTES # FLD: 0 % — SIGNIFICANT CHANGE UP (ref 0–0)
PROT SERPL-MCNC: 7.6 G/DL — SIGNIFICANT CHANGE UP (ref 6–8.3)
RBC # BLD: 4.93 M/UL — SIGNIFICANT CHANGE UP (ref 3.8–5.2)
RBC # FLD: 21.5 % — HIGH (ref 10.3–14.5)
SODIUM SERPL-SCNC: 125 MMOL/L — LOW (ref 135–145)
TARGETS BLD QL SMEAR: SIGNIFICANT CHANGE UP
TROPONIN T, HIGH SENSITIVITY: 28 NG/L — SIGNIFICANT CHANGE UP (ref ?–14)
TROPONIN T, HIGH SENSITIVITY: 29 NG/L — SIGNIFICANT CHANGE UP (ref ?–14)
VARIANT LYMPHS # BLD: 4.5 % — SIGNIFICANT CHANGE UP
WBC # BLD: 7.83 K/UL — SIGNIFICANT CHANGE UP (ref 3.8–10.5)
WBC # FLD AUTO: 7.83 K/UL — SIGNIFICANT CHANGE UP (ref 3.8–10.5)

## 2018-08-20 PROCEDURE — 71046 X-RAY EXAM CHEST 2 VIEWS: CPT | Mod: 26

## 2018-08-20 RX ORDER — BUMETANIDE 0.25 MG/ML
2 INJECTION INTRAMUSCULAR; INTRAVENOUS ONCE
Qty: 0 | Refills: 0 | Status: COMPLETED | OUTPATIENT
Start: 2018-08-20 | End: 2018-08-20

## 2018-08-20 RX ORDER — CALCIUM GLUCONATE 100 MG/ML
1 VIAL (ML) INTRAVENOUS ONCE
Qty: 0 | Refills: 0 | Status: COMPLETED | OUTPATIENT
Start: 2018-08-20 | End: 2018-08-20

## 2018-08-20 RX ORDER — BUMETANIDE 0.25 MG/ML
1 INJECTION INTRAMUSCULAR; INTRAVENOUS ONCE
Qty: 0 | Refills: 0 | Status: DISCONTINUED | OUTPATIENT
Start: 2018-08-20 | End: 2018-08-20

## 2018-08-20 RX ORDER — FUROSEMIDE 40 MG
80 TABLET ORAL ONCE
Qty: 0 | Refills: 0 | Status: DISCONTINUED | OUTPATIENT
Start: 2018-08-20 | End: 2018-08-20

## 2018-08-20 RX ORDER — BUMETANIDE 0.25 MG/ML
2 INJECTION INTRAMUSCULAR; INTRAVENOUS ONCE
Qty: 0 | Refills: 0 | Status: DISCONTINUED | OUTPATIENT
Start: 2018-08-20 | End: 2018-08-20

## 2018-08-20 RX ADMIN — BUMETANIDE 2 MILLIGRAM(S): 0.25 INJECTION INTRAMUSCULAR; INTRAVENOUS at 21:36

## 2018-08-20 RX ADMIN — Medication 200 GRAM(S): at 22:35

## 2018-08-20 NOTE — ED ADULT NURSE NOTE - NSIMPLEMENTINTERV_GEN_ALL_ED
Implemented All Universal Safety Interventions:  [place] to call system. Call bell, personal items and telephone within reach. Instruct patient to call for assistance. Room bathroom lighting operational. Non-slip footwear when patient is off stretcher. Physically safe environment: no spills, clutter or unnecessary equipment. Stretcher in lowest position, wheels locked, appropriate side rails in place.

## 2018-08-20 NOTE — ED PROVIDER NOTE - MEDICAL DECISION MAKING DETAILS
62F, presenting with fluid overload. anasarca, scleral icterus, lungs clear. plan for cbc, cmp, trop, bnp, ekg, cxr, diuresis. will reassess. 62F, presenting with apparent volume overload in setting of CHF. Scleral icterus, lungs clear. plan for cbc, cmp, trop, bnp, ekg, cxr, diuresis. will reassess.  Suspect CHF exacerbation.  Likely admit.

## 2018-08-20 NOTE — ED PROVIDER NOTE - OBJECTIVE STATEMENT
62F, PMH of CHF, DM, HTN, thyroid Ca presenting with fluid retention. Patient reports gaining 10lbs over the past week. Realized she was retaining fluid in her legs and abdomen. Increasingly short of breath when walking up stairs. Reports her eyes have turned yellow which has not occurred before. Has abdominal pain, one episode of diarrhea today. Denies fever, chest pain, shortness of breathing, nausea, urinary symptoms. 62F, PMH of CHF, DM, HTN, thyroid Ca presenting with fluid retention. Patient reports gaining 10lbs over the past week. Realized she was retaining fluid in her legs and abdomen. Increasingly short of breath when walking up stairs. Reports her eyes have turned yellow which occurred once before, during her last CHF exacerbation. Has abdominal pain, one episode of diarrhea today. Denies fever, chest pain, shortness of breathing, nausea, urinary symptoms.

## 2018-08-20 NOTE — ED ADULT TRIAGE NOTE - CHIEF COMPLAINT QUOTE
C/o worsening SOB and B/L lower extremity pitting edema. Pt reports CHF history taking diuretic at home without relief. Respirations even and unlabored in triage, EKG in progress

## 2018-08-20 NOTE — ED ADULT NURSE NOTE - OBJECTIVE STATEMENT
pt received to room 15, A&Ox4, ambulatory, c/o worsening BLE edema and EDDY x1 week. pt has a hx CHF. pt endorses 1 episode diaerrhea earlier today, and generalized abdominal pain. abdomen is distended, soft, non tender. respirations equal and non labored, lung sounds clear and equal bilaterally. BLE edema extends to mid thigh. skin on legs is taut, ankles are pitting. skin intact. pt received to room 15, A&Ox4, ambulatory, c/o worsening BLE edema and EDDY x1 week. pt has a hx CHF. pt endorses 1 episode diaerrhea earlier today, and generalized abdominal pain. abdomen is distended, soft, non tender. respirations equal and non labored, lung sounds clear and equal bilaterally. BLE edema extends to mid thigh. skin on legs is taut, ankles are pitting. skin intact. JVD noted bilaterally at HOB 90*. yellowing noted to sclera of eyes.

## 2018-08-20 NOTE — ED PROVIDER NOTE - ATTENDING CONTRIBUTION TO CARE
Attending Attestation: Dr. Del Toro  I have personally performed a history and physical examination of the patient and discussed management with the resident as well as the patient.  I reviewed the resident's note and agree with the documented findings and plan of care.  I have authored and modified critical sections of the Provider Note, including but not limited to HPI, Physical Exam and MDM. 62F, presenting with apparent volume overload in setting of CHF. Scleral icterus, lungs clear. plan for cbc, cmp, trop, bnp, ekg, cxr, diuresis. will reassess.  Suspect CHF exacerbation.  Likely admit.

## 2018-08-20 NOTE — ED PROVIDER NOTE - PHYSICAL EXAMINATION
General: well appearing female, no acute distress   HEENT: scleral icterus   Respiratory: normal work of breathing, lungs clear to auscultation bilaterally   Cardiac: regular rate and rhythm   Abdomen: soft, distended, non-tender   MSK: 3+ pitting edema to thighs, thighs appear tight and edematous, non-tender   Skin: no rashes   Neuro: A&Ox3

## 2018-08-21 DIAGNOSIS — R94.5 ABNORMAL RESULTS OF LIVER FUNCTION STUDIES: ICD-10-CM

## 2018-08-21 DIAGNOSIS — E83.51 HYPOCALCEMIA: ICD-10-CM

## 2018-08-21 DIAGNOSIS — I50.23 ACUTE ON CHRONIC SYSTOLIC (CONGESTIVE) HEART FAILURE: ICD-10-CM

## 2018-08-21 DIAGNOSIS — E11.65 TYPE 2 DIABETES MELLITUS WITH HYPERGLYCEMIA: ICD-10-CM

## 2018-08-21 DIAGNOSIS — N17.9 ACUTE KIDNEY FAILURE, UNSPECIFIED: ICD-10-CM

## 2018-08-21 DIAGNOSIS — I10 ESSENTIAL (PRIMARY) HYPERTENSION: ICD-10-CM

## 2018-08-21 DIAGNOSIS — D64.9 ANEMIA, UNSPECIFIED: ICD-10-CM

## 2018-08-21 DIAGNOSIS — M62.82 RHABDOMYOLYSIS: ICD-10-CM

## 2018-08-21 DIAGNOSIS — Z29.9 ENCOUNTER FOR PROPHYLACTIC MEASURES, UNSPECIFIED: ICD-10-CM

## 2018-08-21 DIAGNOSIS — Z95.810 PRESENCE OF AUTOMATIC (IMPLANTABLE) CARDIAC DEFIBRILLATOR: Chronic | ICD-10-CM

## 2018-08-21 LAB
24R-OH-CALCIDIOL SERPL-MCNC: 59.3 NG/ML — SIGNIFICANT CHANGE UP (ref 30–80)
BUN SERPL-MCNC: 49 MG/DL — HIGH (ref 7–23)
CA-I BLD-SCNC: 0.63 MMOL/L — CRITICAL LOW (ref 1.03–1.23)
CALCIUM SERPL-MCNC: 6.5 MG/DL — CRITICAL LOW (ref 8.4–10.5)
CHLORIDE SERPL-SCNC: 77 MMOL/L — LOW (ref 98–107)
CHOLEST SERPL-MCNC: 85 MG/DL — LOW (ref 120–199)
CK MB BLD-MCNC: 4.61 NG/ML — SIGNIFICANT CHANGE UP (ref 1–4.7)
CK SERPL-CCNC: 1013 U/L — HIGH (ref 25–170)
CO2 SERPL-SCNC: 27 MMOL/L — SIGNIFICANT CHANGE UP (ref 22–31)
CREAT SERPL-MCNC: 1.51 MG/DL — HIGH (ref 0.5–1.3)
FERRITIN SERPL-MCNC: 54.86 NG/ML — SIGNIFICANT CHANGE UP (ref 15–150)
GLUCOSE SERPL-MCNC: 141 MG/DL — HIGH (ref 70–99)
HAV IGM SER-ACNC: NONREACTIVE — SIGNIFICANT CHANGE UP
HBA1C BLD-MCNC: 6.4 % — HIGH (ref 4–5.6)
HBV CORE IGM SER-ACNC: NONREACTIVE — SIGNIFICANT CHANGE UP
HBV SURFACE AG SER-ACNC: NONREACTIVE — SIGNIFICANT CHANGE UP
HCT VFR BLD CALC: 35.7 % — SIGNIFICANT CHANGE UP (ref 34.5–45)
HCV AB S/CO SERPL IA: 0.14 S/CO — SIGNIFICANT CHANGE UP
HCV AB SERPL-IMP: SIGNIFICANT CHANGE UP
HDLC SERPL-MCNC: 9 MG/DL — LOW (ref 45–65)
HGB BLD-MCNC: 12.1 G/DL — SIGNIFICANT CHANGE UP (ref 11.5–15.5)
IRON SATN MFR SERPL: 30 UG/DL — SIGNIFICANT CHANGE UP (ref 30–160)
IRON SATN MFR SERPL: 396 UG/DL — SIGNIFICANT CHANGE UP (ref 140–530)
LIPID PNL WITH DIRECT LDL SERPL: 55 MG/DL — SIGNIFICANT CHANGE UP
MAGNESIUM SERPL-MCNC: 1.8 MG/DL — SIGNIFICANT CHANGE UP (ref 1.6–2.6)
MCHC RBC-ENTMCNC: 23.2 PG — LOW (ref 27–34)
MCHC RBC-ENTMCNC: 33.9 % — SIGNIFICANT CHANGE UP (ref 32–36)
MCV RBC AUTO: 68.5 FL — LOW (ref 80–100)
NRBC # FLD: 0.04 — SIGNIFICANT CHANGE UP
PHOSPHATE SERPL-MCNC: 5.1 MG/DL — HIGH (ref 2.5–4.5)
PLATELET # BLD AUTO: 230 K/UL — SIGNIFICANT CHANGE UP (ref 150–400)
PMV BLD: SIGNIFICANT CHANGE UP FL (ref 7–13)
POTASSIUM SERPL-MCNC: 3.7 MMOL/L — SIGNIFICANT CHANGE UP (ref 3.5–5.3)
POTASSIUM SERPL-SCNC: 3.7 MMOL/L — SIGNIFICANT CHANGE UP (ref 3.5–5.3)
PTH-INTACT SERPL-MCNC: 21.37 PG/ML — SIGNIFICANT CHANGE UP (ref 15–65)
RBC # BLD: 5.21 M/UL — HIGH (ref 3.8–5.2)
RBC # FLD: 21.5 % — HIGH (ref 10.3–14.5)
SODIUM SERPL-SCNC: 126 MMOL/L — LOW (ref 135–145)
TRIGL SERPL-MCNC: 93 MG/DL — SIGNIFICANT CHANGE UP (ref 10–149)
TROPONIN T, HIGH SENSITIVITY: 26 NG/L — SIGNIFICANT CHANGE UP (ref ?–14)
TSH SERPL-MCNC: 1.4 UIU/ML — SIGNIFICANT CHANGE UP (ref 0.27–4.2)
UIBC SERPL-MCNC: 366.3 UG/DL — SIGNIFICANT CHANGE UP (ref 110–370)
WBC # BLD: 7.12 K/UL — SIGNIFICANT CHANGE UP (ref 3.8–10.5)
WBC # FLD AUTO: 7.12 K/UL — SIGNIFICANT CHANGE UP (ref 3.8–10.5)

## 2018-08-21 PROCEDURE — 76700 US EXAM ABDOM COMPLETE: CPT | Mod: 26

## 2018-08-21 RX ORDER — DEXTROSE 50 % IN WATER 50 %
15 SYRINGE (ML) INTRAVENOUS ONCE
Qty: 0 | Refills: 0 | Status: DISCONTINUED | OUTPATIENT
Start: 2018-08-21 | End: 2018-08-28

## 2018-08-21 RX ORDER — ASPIRIN/CALCIUM CARB/MAGNESIUM 324 MG
81 TABLET ORAL DAILY
Qty: 0 | Refills: 0 | Status: DISCONTINUED | OUTPATIENT
Start: 2018-08-21 | End: 2018-08-28

## 2018-08-21 RX ORDER — CALCITRIOL 0.5 UG/1
0.5 CAPSULE ORAL DAILY
Qty: 0 | Refills: 0 | Status: DISCONTINUED | OUTPATIENT
Start: 2018-08-21 | End: 2018-08-21

## 2018-08-21 RX ORDER — DEXTROSE 50 % IN WATER 50 %
25 SYRINGE (ML) INTRAVENOUS ONCE
Qty: 0 | Refills: 0 | Status: DISCONTINUED | OUTPATIENT
Start: 2018-08-21 | End: 2018-08-28

## 2018-08-21 RX ORDER — CHOLECALCIFEROL (VITAMIN D3) 125 MCG
1000 CAPSULE ORAL DAILY
Qty: 0 | Refills: 0 | Status: DISCONTINUED | OUTPATIENT
Start: 2018-08-21 | End: 2018-08-28

## 2018-08-21 RX ORDER — CALCITRIOL 0.5 UG/1
0.5 CAPSULE ORAL
Qty: 0 | Refills: 0 | Status: DISCONTINUED | OUTPATIENT
Start: 2018-08-21 | End: 2018-08-28

## 2018-08-21 RX ORDER — FUROSEMIDE 40 MG
80 TABLET ORAL
Qty: 0 | Refills: 0 | Status: DISCONTINUED | OUTPATIENT
Start: 2018-08-21 | End: 2018-08-24

## 2018-08-21 RX ORDER — SODIUM CHLORIDE 9 MG/ML
1000 INJECTION, SOLUTION INTRAVENOUS
Qty: 0 | Refills: 0 | Status: DISCONTINUED | OUTPATIENT
Start: 2018-08-21 | End: 2018-08-28

## 2018-08-21 RX ORDER — GLUCAGON INJECTION, SOLUTION 0.5 MG/.1ML
1 INJECTION, SOLUTION SUBCUTANEOUS ONCE
Qty: 0 | Refills: 0 | Status: DISCONTINUED | OUTPATIENT
Start: 2018-08-21 | End: 2018-08-28

## 2018-08-21 RX ORDER — INSULIN LISPRO 100/ML
VIAL (ML) SUBCUTANEOUS
Qty: 0 | Refills: 0 | Status: DISCONTINUED | OUTPATIENT
Start: 2018-08-21 | End: 2018-08-28

## 2018-08-21 RX ORDER — LEVOTHYROXINE SODIUM 125 MCG
175 TABLET ORAL DAILY
Qty: 0 | Refills: 0 | Status: DISCONTINUED | OUTPATIENT
Start: 2018-08-21 | End: 2018-08-28

## 2018-08-21 RX ORDER — CALCIUM CARBONATE 500(1250)
2 TABLET ORAL THREE TIMES A DAY
Qty: 0 | Refills: 0 | Status: DISCONTINUED | OUTPATIENT
Start: 2018-08-21 | End: 2018-08-28

## 2018-08-21 RX ORDER — INSULIN LISPRO 100/ML
VIAL (ML) SUBCUTANEOUS AT BEDTIME
Qty: 0 | Refills: 0 | Status: DISCONTINUED | OUTPATIENT
Start: 2018-08-21 | End: 2018-08-28

## 2018-08-21 RX ORDER — METOPROLOL TARTRATE 50 MG
25 TABLET ORAL DAILY
Qty: 0 | Refills: 0 | Status: DISCONTINUED | OUTPATIENT
Start: 2018-08-21 | End: 2018-08-28

## 2018-08-21 RX ORDER — DEXTROSE 50 % IN WATER 50 %
12.5 SYRINGE (ML) INTRAVENOUS ONCE
Qty: 0 | Refills: 0 | Status: DISCONTINUED | OUTPATIENT
Start: 2018-08-21 | End: 2018-08-28

## 2018-08-21 RX ORDER — MAGNESIUM OXIDE 400 MG ORAL TABLET 241.3 MG
400 TABLET ORAL
Qty: 0 | Refills: 0 | Status: DISCONTINUED | OUTPATIENT
Start: 2018-08-21 | End: 2018-08-28

## 2018-08-21 RX ORDER — HEPARIN SODIUM 5000 [USP'U]/ML
5000 INJECTION INTRAVENOUS; SUBCUTANEOUS EVERY 12 HOURS
Qty: 0 | Refills: 0 | Status: DISCONTINUED | OUTPATIENT
Start: 2018-08-21 | End: 2018-08-28

## 2018-08-21 RX ADMIN — Medication 1: at 18:09

## 2018-08-21 RX ADMIN — CALCITRIOL 0.5 MICROGRAM(S): 0.5 CAPSULE ORAL at 22:03

## 2018-08-21 RX ADMIN — CALCITRIOL 0.5 MICROGRAM(S): 0.5 CAPSULE ORAL at 13:21

## 2018-08-21 RX ADMIN — Medication 2 TABLET(S): at 17:07

## 2018-08-21 RX ADMIN — MAGNESIUM OXIDE 400 MG ORAL TABLET 400 MILLIGRAM(S): 241.3 TABLET ORAL at 13:21

## 2018-08-21 RX ADMIN — HEPARIN SODIUM 5000 UNIT(S): 5000 INJECTION INTRAVENOUS; SUBCUTANEOUS at 07:19

## 2018-08-21 RX ADMIN — Medication 80 MILLIGRAM(S): at 07:54

## 2018-08-21 RX ADMIN — MAGNESIUM OXIDE 400 MG ORAL TABLET 400 MILLIGRAM(S): 241.3 TABLET ORAL at 18:09

## 2018-08-21 RX ADMIN — Medication 2 TABLET(S): at 22:04

## 2018-08-21 RX ADMIN — Medication 25 MILLIGRAM(S): at 07:20

## 2018-08-21 RX ADMIN — Medication 1000 UNIT(S): at 13:20

## 2018-08-21 RX ADMIN — Medication 1 TABLET(S): at 13:21

## 2018-08-21 RX ADMIN — Medication 81 MILLIGRAM(S): at 13:21

## 2018-08-21 RX ADMIN — Medication 100 MILLIGRAM(S): at 13:24

## 2018-08-21 RX ADMIN — Medication 1 TABLET(S): at 07:53

## 2018-08-21 RX ADMIN — Medication 175 MICROGRAM(S): at 07:21

## 2018-08-21 RX ADMIN — MAGNESIUM OXIDE 400 MG ORAL TABLET 400 MILLIGRAM(S): 241.3 TABLET ORAL at 07:55

## 2018-08-21 RX ADMIN — Medication 1: at 13:20

## 2018-08-21 RX ADMIN — HEPARIN SODIUM 5000 UNIT(S): 5000 INJECTION INTRAVENOUS; SUBCUTANEOUS at 18:09

## 2018-08-21 NOTE — H&P ADULT - PROBLEM SELECTOR PLAN 6
CK on admission noted to be 1026->1022 with negative HsT and mild elevated CKMB  Will hold off on IVF as patient admitted for ADHF   Will check CK with morning labs to trend level

## 2018-08-21 NOTE — H&P ADULT - ATTENDING COMMENTS
Patient seen and examined.  Agree with above.   -admitted with volume overload and weight gain  -on exam, patient with + JVD and lower extremity edema  -iv lasix to keep O > I  -gi eval  -renal eval    Kunal Muller MD

## 2018-08-21 NOTE — H&P ADULT - PMH
Asthma    CHF (congestive heart failure)  with EF of 10% s/p AICD  DM (diabetes mellitus)    HTN (hypertension)    Thyroid ca

## 2018-08-21 NOTE — H&P ADULT - NEGATIVE MUSCULOSKELETAL SYMPTOMS
no muscle cramps/no arthralgia/no stiffness/no arthritis/no joint swelling/no muscle weakness/no neck pain/no myalgia

## 2018-08-21 NOTE — H&P ADULT - PROBLEM SELECTOR PLAN 3
BUN/Cr: 51/1.78(with prior labs revealing normal Cr). Likely cardio-renal mediated from underline CHF  Will monitor for now   Avoid nephrotoxic medications   Will need to call Nephrology consult in am

## 2018-08-21 NOTE — H&P ADULT - NEGATIVE NEUROLOGICAL SYMPTOMS
no loss of consciousness/no generalized seizures/no focal seizures/no paresthesias/no syncope/no weakness/no difficulty walking/no loss of sensation/no transient paralysis/no headache/no confusion/no tremors/no vertigo/no hemiparesis

## 2018-08-21 NOTE — H&P ADULT - NEGATIVE GASTROINTESTINAL SYMPTOMS
no abdominal pain/no hematochezia/no nausea/no vomiting/no melena/no diarrhea/no constipation/no change in bowel habits

## 2018-08-21 NOTE — H&P ADULT - PROBLEM SELECTOR PLAN 5
Bili: 13.2, Alk Phos: 420, AST: 80(patient with prior labs revealing also elevated LFTs) likely 2/2 to hepatic congestion   Will check Acute hepatitis panel  Abdominal US ordered

## 2018-08-21 NOTE — CONSULT NOTE ADULT - SUBJECTIVE AND OBJECTIVE BOX
Chief Complaint:  Patient is a 62y old  Female who presents with a chief complaint of SOB and bilateral LE swelling (21 Aug 2018 00:36)    Asthma  CHF (congestive heart failure)  DM (diabetes mellitus)  HTN (hypertension)  Thyroid ca  AICD (automatic cardioverter/defibrillator) present  S/P thyroidectomy     HPI:  63yo F with PMH of CHF(with EF of 10% s/p AICD) with recent CCU stay for inotrope assisted diuresis, DM, HTN, Thyroid cancer s/p thyroidectomy presented with the complaint of weight gain and LE swelling. As per the patient when she got discharged she was feeling fine and had gone to [place] to see her mother and was complaint with her medications(especially Torsemide) and was urinating a lot. Patient stated that she gained about 10 lb in 1 week and stated that it was 2/2 to not urinating as much even though she is taking the Torsemide. Patient stated that she was told by her cardiologist if she either eats salt or is not urinating much then double the dose of her Torsemide which she has been doing for the past week without much improvement of the LE. Patient stated that the swelling has progressed to her thighs which has caused her inability to move much. Patient stated that she normally sleeps with 1 pillow at night but if she is coughing she sleeps one 3 pillows. Patient also endorsed of orthopnea and PND. Patient stated that she does not eat food high in salt and also limits her water intake to 32 oz. Patient also had noticed of abdominal distention for the past few weeks. Patient also endorsed of productive cough with white/clear sputum production. Patient denied any CP, fevers, chills, N/V/D/C, abdominal pain, dysuria, melena, hematochezia, sick contact, pleuritic or positional chest pain.   On ED admission EKG revealed Atrial sensed V-paced rhythm at a rate of 94 with QTc of 617, CE x 1: Trop: 28, CK: 1026, CE x 2: Trop: 29, CK: 1022, H&H: 11.4/33.8, Na: 125, BUN/Cr: 51/1.78, Gluc: 188, Ca: 6.3, Bili: 13.2, Alk Phos: 420, AST: 80, BNP: 2262. Prelim CXR: Cardiomegaly. Clear lungs. In the ED patient received 1x dose of IV Bumex 2mg.      GI consulted for increased LFTs/Bilirubin. The patient reports that she intermittently will have abdominal discomfort and nausea after eating. Gastric emptying study in 1/2018 with no gastroparesis. She had an EGD 5/2017 with esophagitis and gastritis. She has had MRCPs, US, and HIDA scans with no evidence of obstructive findings on any of these images; increase in lft and bilirubin during those admissions were thought to be associated with congestive hepatopathy. This admission the Bilirubin is significantly higher than previous admissions. She does note some distention in her abdomen but is without any epigastric or ruq discomfort. She noted icterus over the past month. US Abdomen during this admission with cholelithiasis. She endorses recent travel to [place] during July x 3 weeks (recently returned). Denying any recreational drug use, etoh use, herbal supplements, increased nsaid or tylenol use or new medications. denies n/v/d/c, abdominal pain, melena or brbpr     Isordil (Headache)  penicillin (Rash)      aspirin enteric coated 81 milliGRAM(s) Oral daily  calcitriol   Capsule 0.5 MICROGram(s) Oral two times a day  calcium carbonate    500 mG (Tums) Chewable 2 Tablet(s) Chew three times a day  cholecalciferol 1000 Unit(s) Oral daily  dextrose 40% Gel 15 Gram(s) Oral once PRN  dextrose 5%. 1000 milliLiter(s) IV Continuous <Continuous>  dextrose 50% Injectable 12.5 Gram(s) IV Push once  dextrose 50% Injectable 25 Gram(s) IV Push once  dextrose 50% Injectable 25 Gram(s) IV Push once  furosemide   Injectable 80 milliGRAM(s) IV Push two times a day  glucagon  Injectable 1 milliGRAM(s) IntraMuscular once PRN  guaiFENesin   Syrup  (Sugar-Free) 100 milliGRAM(s) Oral every 6 hours PRN  heparin  Injectable 5000 Unit(s) SubCutaneous every 12 hours  insulin lispro (HumaLOG) corrective regimen sliding scale   SubCutaneous three times a day before meals  insulin lispro (HumaLOG) corrective regimen sliding scale   SubCutaneous at bedtime  levothyroxine 175 MICROGram(s) Oral daily  magnesium oxide 400 milliGRAM(s) Oral three times a day with meals  metoprolol succinate ER 25 milliGRAM(s) Oral daily        FAMILY HISTORY:  No pertinent family history in first degree relatives        Review of Systems:    General:  No wt loss, fevers, chills, night sweats, fatigue  Eyes:  Good vision, no reported pain  ENT:  No sore throat, pain, runny nose, dysphagia  CV:  No pain, palpitations, no lightheadedness  Resp:  No dyspnea, cough, tachypnea, wheezing  GI: as above  :  No pain, bleeding, incontinence, nocturia  Muscle:  No pain, weakness  Neuro:  No weakness, tingling, memory problems  Psych:  No fatigue, insomnia, mood problems, depression  Endocrine:  No polyuria, polydypsia, cold/heat intolerance  Heme:  No petechiae, ecchymosis, easy bruisability  Skin:  No rash, tattoos, scars, edema    Relevant Family History:   n/c    Relevant Social History: n/c      Physical Exam:    Vital Signs:  Vital Signs Last 24 Hrs  T(C): 36.8 (21 Aug 2018 10:30), Max: 37.1 (21 Aug 2018 07:52)  T(F): 98.3 (21 Aug 2018 10:30), Max: 98.8 (21 Aug 2018 07:52)  HR: 89 (21 Aug 2018 10:30) (86 - 95)  BP: 106/79 (21 Aug 2018 10:30) (99/77 - 122/80)  BP(mean): --  RR: 18 (21 Aug 2018 10:30) (15 - 25)  SpO2: 100% (21 Aug 2018 10:30) (100% - 100%)  Daily     Daily     General:  Appears stated age, well-groomed, nad  HEENT:  NC/AT,  conjunctivae clear and pink, no thyromegaly, nodules, adenopathy, no JVD; +icterus  Chest:  Full & symmetric excursion, no increased effort, breath sounds clear  Cardiovascular:  Regular rhythm, S1, S2, no murmur/rub/S3/S4, no abdominal bruit, no edema  Abdomen:  Soft, non-tender, +distended, normoactive bowel sounds,  no masses ,no hepatosplenomeagaly, no signs of chronic liver disease  Extremities:  no cyanosis,clubbing or edema  Skin:  No rash/erythema/ecchymoses/petechiae/wounds/abscess/warm/dry  Neuro/Psych:  A&Ox3  , no asterixis, no tremor, no encephalopathy    Laboratory:                            12.1   7.12  )-----------( 230      ( 21 Aug 2018 07:47 )             35.7     08-21    126<L>  |  77<L>  |  49<H>  ----------------------------<  141<H>  3.7   |  27  |  1.51<H>    Ca    6.5<LL>      21 Aug 2018 07:47  Phos  5.1     08-21  Mg     1.8     08-21    TPro  7.6  /  Alb  3.5  /  TBili  13.2<H>  /  DBili  x   /  AST  80<H>  /  ALT  33  /  AlkPhos  420<H>  08-20    LIVER FUNCTIONS - ( 20 Aug 2018 20:30 )  Alb: 3.5 g/dL / Pro: 7.6 g/dL / ALK PHOS: 420 u/L / ALT: 33 u/L / AST: 80 u/L / GGT: x                 Imaging:    < from: US Abdomen Complete (08.21.18 @ 12:05) >    EXAM:  US ABDOMEN COMPLETE        PROCEDURE DATE:  Aug 21 2018         INTERPRETATION:  CLINICAL INFORMATION: Elevated LFTs.    COMPARISON: 1/14/2018    TECHNIQUE: Sonography of the abdomen.     FINDINGS:    Liver: Within normal limits.    Bile ducts: Normal caliber. Common bile duct measures 4 mm.     Gallbladder: Cholelithiasis. Contracted.        Pancreas: Visualized portions are within normal limits.    Spleen: 12 cm. Within normal limits.    Right kidney: 10 cm. No hydronephrosis.        Left kidney: 11 cm.  No hydronephrosis.        Ascites: None.    Aorta and IVC: Visualized portions are within normal limits.    IMPRESSION:     Cholelithiasis.                  BRADY AGUILAR M.D., ATTENDING RADIOLOGIST  This document has been electronically signed. Aug 21 2018  1:04PM                  < end of copied text >

## 2018-08-21 NOTE — H&P ADULT - NSHPLABSRESULTS_GEN_ALL_CORE
11.4   7.83  )-----------( 239      ( 20 Aug 2018 20:30 )             33.8     08-20    125<L>  |  76<L>  |  51<H>  ----------------------------<  188<H>  4.7   |  26  |  1.78<H>    Ca    6.3<LL>      20 Aug 2018 20:30  Phos  5.4     08-20  Mg     1.7     08-20    TPro  7.6  /  Alb  3.5  /  TBili  13.2<H>  /  DBili  x   /  AST  80<H>  /  ALT  33  /  AlkPhos  420<H>  08-20    Prelim CXR: Cardiomegaly. Clear lungs.    EKG: Atrial sensed V-paced rhythm at a rate of 94 with QTc of 617

## 2018-08-21 NOTE — H&P ADULT - PROBLEM SELECTOR PLAN 4
Ca: 6.3 and patient received IV Calcium gluconate in the ED  Continue with Calcitrol, Oscal with D, and Vitamin D3  Will check Ionized calcium level, Vitam D-hydroxy, PTH   Will need to call Endocrine consult in am

## 2018-08-21 NOTE — H&P ADULT - HISTORY OF PRESENT ILLNESS
63 y/o F with PMH of CHF(with EF of 10% s/p AICD) with recent CCU stay for inotrope assisted diuresis, DM, HTN, Thyroid cancer s/p thyroidectomy presented with the complaint of weight gain and LE swelling. As per the patient when she got discharged she was 61 y/o F with PMH of CHF(with EF of 10% s/p AICD) with recent CCU stay for inotrope assisted diuresis, DM, HTN, Thyroid cancer s/p thyroidectomy presented with the complaint of weight gain and LE swelling. As per the patient when she got discharged she was feeling fine and had gone to [place] to see her mother and was complaint with her medications(especially Torsemide) and was urinating a lot. Patient stated that she gained about 10 lb in 1 week and stated that it was 2/2 to not urinating as much even though she is taking the Torsemide. Patient stated that she was told by her cardiologist if she either eats salt or is not urinating much then double the dose of her Torsemide which she has been doing for the past week without much improvement of the LE. Patient stated that the swelling has progressed to her thighs which has caused her inability to move much. Patient stated that she normally sleeps with 1 pillow at night but if she is coughing she sleeps one 3 pillows. Patient also endorsed of orthopnea and PND. Patient stated that she does not eat food high in salt and also limits her water intake to 32 oz. Patient also had noticed of abdominal distention for the past few weeks. Patient also endorsed of productive cough with white/clear sputum production. Patient denied any CP, fevers, chills, N/V/D/C, abdominal pain, dysuria, melena, hematochezia, sick contact, pleuritic or positional chest pain.     On ED admission EKG revealed Atrial sensed V-paced rhythm at a rate of 94 with QTc of 617, CE x 1: Trop: 28, CK: 1026, CE x 2: Trop: 29, CK: 1022, H&H: 11.4/33.8, Na: 125, BUN/Cr: 51/1.78, Gluc: 188, Ca: 6.3, Bili: 13.2, Alk Phos: 420, AST: 80, BNP: 2262. Prelim CXR: Cardiomegaly. Clear lungs. In the ED patient received 1x dose of IV Bumex 2mg. When examined patient is resting in the stretcher and stated that she urinated "a little after getting the Bumex but she is not SOB when she is resting, but did endorse to bilateral LE swelling", denied any other complaints.

## 2018-08-21 NOTE — H&P ADULT - PROBLEM SELECTOR PLAN 1
Patient with prior Hx of multiple CCU admission for inotropic assisted diuresis here with ADHF. Patient s/p IV Bumex 2mg in the ED  Admit to telemetry, serial Rufus, serial EKGs  HgbA1C, TSH, lipid profile, CBC, CMP in am   Low sodium diet, daily weights, monitor I's and O's, fluid restrictions 1000cc/day  Check BNP in 48 hours   Started on Lasix IV 80mg BID   TTE ordered to evaluate LVEF   As per patient she does not take the Entresto due to its side-effects of causing abdominal pain and diarrhea  Will need to call HF team in am

## 2018-08-21 NOTE — CONSULT NOTE ADULT - SUBJECTIVE AND OBJECTIVE BOX
Patient is a 62y old  Female who presents with a chief complaint of SOB and bilateral LE swelling (21 Aug 2018 00:36)        HPI:  61 y/o F with PMH of CHF(with EF of 10% s/p AICD) with recent CCU stay for inotrope assisted diuresis, DM, HTN, Thyroid cancer s/p thyroidectomy presented with the complaint of weight gain and LE swelling.     As per patient, after her recent hospitalization, she had spent time in [place] to visit family and was compliant with her medication regimen.  SHe noted a weight gain of nearly 10 lbs in one week with her urinary output diminishing over time.  Patient noted increasing swelling of l/e in addition to thigh region and inability to lay flat in bed in addition to worsening abdominal distention  Once orthopnea and dyspnea worsened, patient began taking larger dose of Torsemide (was advised by cardiologist to double dose).  Patient denied any CP, fevers, chills, N/V/D/C and abdominal pain.  Patient denies any recent use of NSAID/nephrotoxic agents.   Patient has no known history of kidney disease.  During recent hospitalization in July, Creatinine was stable and near ~1 upon discharge.     Upon this admission, patient found to have MAGNUS (creatinine ~1.7 mg/dl).  Patient was also found to have hyponatremia and hypocalcemia in which a renal consult was called for further evaluation.  In the ED patient received 1x dose of IV Bumex 2mg.  Patient now on Lasix IV 80 mg BID and reports some improvement in breathing.      PAST MEDICAL & SURGICAL HISTORY:  Asthma  CHF (congestive heart failure): with EF of 10% s/p AICD  DM (diabetes mellitus)  HTN (hypertension)  Thyroid ca  AICD (automatic cardioverter/defibrillator) present  S/P thyroidectomy      MEDICATIONS  (STANDING):  aspirin enteric coated 81 milliGRAM(s) Oral daily  calcitriol   Capsule 0.5 MICROGram(s) Oral daily  calcium carbonate 1250 mG  + Vitamin D (OsCal 500 + D) 1 Tablet(s) Oral three times a day  cholecalciferol 1000 Unit(s) Oral daily  dextrose 5%. 1000 milliLiter(s) (50 mL/Hr) IV Continuous <Continuous>  dextrose 50% Injectable 12.5 Gram(s) IV Push once  dextrose 50% Injectable 25 Gram(s) IV Push once  dextrose 50% Injectable 25 Gram(s) IV Push once  furosemide   Injectable 80 milliGRAM(s) IV Push two times a day  heparin  Injectable 5000 Unit(s) SubCutaneous every 12 hours  insulin lispro (HumaLOG) corrective regimen sliding scale   SubCutaneous three times a day before meals  insulin lispro (HumaLOG) corrective regimen sliding scale   SubCutaneous at bedtime  levothyroxine 175 MICROGram(s) Oral daily  magnesium oxide 400 milliGRAM(s) Oral three times a day with meals  metoprolol succinate ER 25 milliGRAM(s) Oral daily      Allergies    Isordil (Headache)  penicillin (Rash)    Intolerances        SOCIAL HISTORY:  Denies ETOh,Smoking,     FAMILY HISTORY:  No pertinent family history in first degree relatives      REVIEW OF SYSTEMS:  CONSTITUTIONAL: No weakness, fevers or chills  EYES/ENT: No visual changes;  No vertigo or throat pain   NECK: No pain or stiffness  RESPIRATORY: No cough, wheezing, hemoptysis; No shortness of breath  CARDIOVASCULAR: No chest pain or palpitations  GASTROINTESTINAL: No abdominal or epigastric pain. No nausea, vomiting, or hematemesis; No diarrhea or constipation. No melena or hematochezia.  GENITOURINARY: No dysuria, frequency or hematuria  NEUROLOGICAL: No numbness or weakness  SKIN: No itching, burning, rashes, or lesions   All other review of systems is negative unless indicated above.    VITAL:  T(C): , Max: 37.1 (08-21-18 @ 07:52)  T(F): , Max: 98.8 (08-21-18 @ 07:52)  HR: 89 (08-21-18 @ 10:30)  BP: 106/79 (08-21-18 @ 10:30)  BP(mean): --  RR: 18 (08-21-18 @ 10:30)  SpO2: 100% (08-21-18 @ 10:30)  Wt(kg): --    PHYSICAL EXAM:  Constitutional: NAD, Alert  HEENT: NCAT, MMM  Neck: Supple, No JVD  Respiratory: diminished b/l   Cardiovascular: RRR s1s2, no m/r/g  Gastrointestinal: BS+, soft, NT/ND  Extremities: +++ b/l l/e and thigh edema   Neurological: no focal deficits; strength grossly intact  Psychiatric: Normal mood, normal affect  Back: no CVAT b/l  Skin: No rashes, no nevi    LABS:                        12.1   7.12  )-----------( 230      ( 21 Aug 2018 07:47 )             35.7     Na(126)/K(3.7)/Cl(77)/HCO3(27)/BUN(49)/Cr(1.51)Glu(141)/Ca(6.5)/Mg(1.8)/PO4(5.1)    08-21 @ 07:47  Na(125)/K(4.7)/Cl(76)/HCO3(26)/BUN(51)/Cr(1.78)Glu(188)/Ca(6.3)/Mg(1.7)/PO4(5.4)    08-20 @ 20:30    Assessment:  61 y/o F with PMH of CHF(with EF of 10% s/p AICD) with recent CCU stay for inotrope assisted diuresis, DM, HTN, Thyroid cancer s/p thyroidectomy presented with the complaint of weight gain and LE swelling.  Patient was found to have MAGNUS, hyponatremia and hypocalcemia.      MAGNUS:  non-oliguric:  Etiology likely CRS in nature:  currently on IV Lasix BID; renal fxn slowly improving  Hypocalcemia:  s/p Calcium gluconate administration in ED; Calcitrol Oscal + Vit D initiated Ca++ slowly improving  Hyponatremia:  likely hypervolemic in nature:  Na+ slowly improving   Hyperphosphatemia  dt MAGNUS; mild     Recommendation  - BMP daily; continue to trend renal fxn  - Ionized calcium, PTH and Vitam D-hydroxy pending  - A/w Lasix 80 mg IV BID for now; HF team to f/u   - Daily weights & I/Os  - Maintain FR as ordered   - TTE pending    - Defer use of ACEi/ARB for now given MAGNUS  - Avoid NSAIDs/nephrotoxins as able      RENAL ATTENDING NOTE:    Seen/examined with NP. Exam as above. In brief, 62 year-old woman with history of asthma, diabetes, hypertension, severe systolic congestive heart failure (EF 10%), with baseline creatinine of ~1mg/dL, who presented yesterday to the [facility] ER with acute on chronic HFrEF.     (1)Renal - MAGNUS - prerenally mediated in association with her decompensated CHF. Starting to improve with IV lasix.    (2)Hypocalcemia - hypoparathyroidism - PTH should be much higher than 21 given the severely depressed calcium level. S/p IV calcium gluconate in ER yesterday. Her high PO4 (5.1) is also from hypoparathyroidism. Indicated for Tums, 1gm TID with meals. Indicated for Calcitriol as well.    (3)Hyponatremia - hypervolemic hyponatremia, most likely    (4)CPK - mildly elevated - can monitor for now, as well as avoid Statins/other meds for hyperlipidemia. No need for IVF, especially given the decompensated CHF.    RECOMMEND:    (1)Tums 1000mg PO TID with meals  (2)Calcitriol 0.5mcg bid  (3)Diuretics per primary team; favor usage of thiazides as able and limitation of loop diuretics as able, in effort to drive up and maintain appropriate calcium levels.  (4)Urine: lytes, osm  (5)CPK daily; no meds for hyperlipidemia; no IVF needed for high CPK  (6)BMP daily  (7)1L Free water restriction    With warm regards,      Brian Henry MD

## 2018-08-21 NOTE — CONSULT NOTE ADULT - PROBLEM SELECTOR RECOMMENDATION 2
- suspect congestive hepatopathy, however, cannot rule out amyloid  - Liver serologies ordered  - avoid hepatotoxins   - US Abdomen reviewed with cholelithiasis; no MRI as pt has AICD   - diurese per cardiology and nephrology   - further recommendations pending liver serologies; may need liver biopsy - suspect congestive hepatopathy, however, cannot rule out amyloid  - Liver serologies ordered -- KING, AMA, Anti Smooth muscle antibody, Ceruloplasmin, Alpha-1 Antitrypsin, Iron, TIBC, Ferritin  - avoid hepatotoxins; Monitor LFTs daily   - US Abdomen reviewed with cholelithiasis; no MRI as pt has AICD   - diurese per cardiology and nephrology   - further recommendations pending liver serologies; may need liver biopsy

## 2018-08-21 NOTE — CONSULT NOTE ADULT - ASSESSMENT
61yo F with PMH of CHF(with EF of 10% s/p AICD) with recent CCU stay for inotrope assisted diuresis, DM, HTN, Thyroid cancer s/p thyroidectomy presented with the complaint of weight gain and LE swelling noted to have elevation in bilirubin and lfts 63yo F with PMH of CHF(with EF of 10% s/p AICD) with recent CCU stay for inotrope assisted diuresis, DM, HTN, Thyroid cancer s/p thyroidectomy presented with the complaint of weight gain and LE swelling noted to have elevation in bilirubin and lfts

## 2018-08-21 NOTE — CONSULT NOTE ADULT - SUBJECTIVE AND OBJECTIVE BOX
HPI:      She is a pleasant 61 y/o female, well known to our practice, with PMH severe NICM (LVEF 10-15%) who had a [employer]onic Biv-ICD implanted at [place], hx DM, HTN, thyroid ca s/p thyroidectomy, recent LHC at [place] was also unremarkable.  She was hospitalized last month for decompensated CHF requiring ionotrope assisted diuresis.  ICD interrogation at that time with no events.  She comes to ED with c/o 10 lb weight gain in 1 week, LE edema, orthopnea, c/w acute on chronic systolic HF exacerbation, also with hypocalcemia, MAGNUS, elevated LFTs and elevated CPK.      Patient stated that she does not eat food high in salt and also limits her water intake to 32 oz. Patient also had noticed of abdominal distention for the past few weeks. Patient also endorsed of productive cough with white/clear sputum production. Patient denied any CP, fevers, chills, N/V/D/C, abdominal pain, dysuria, melena, hematochezia, sick contact, pleuritic or positional chest pain.      PAST MEDICAL & SURGICAL HISTORY:  Asthma  CHF (congestive heart failure): with EF of 10% s/p AICD  DM (diabetes mellitus)  HTN (hypertension)  Thyroid ca  AICD (automatic cardioverter/defibrillator) present  S/P thyroidectomy        MEDICATIONS  (STANDING):  aspirin enteric coated 81 milliGRAM(s) Oral daily  calcitriol   Capsule 0.5 MICROGram(s) Oral two times a day  calcium carbonate    500 mG (Tums) Chewable 2 Tablet(s) Chew three times a day  cholecalciferol 1000 Unit(s) Oral daily  dextrose 5%. 1000 milliLiter(s) (50 mL/Hr) IV Continuous <Continuous>  furosemide   Injectable 80 milliGRAM(s) IV Push two times a day  heparin  Injectable 5000 Unit(s) SubCutaneous every 12 hours  insulin lispro (HumaLOG) corrective regimen sliding scale   SubCutaneous three times a day before meals  insulin lispro (HumaLOG) corrective regimen sliding scale   SubCutaneous at bedtime  levothyroxine 175 MICROGram(s) Oral daily  magnesium oxide 400 milliGRAM(s) Oral three times a day with meals  metoprolol succinate ER 25 milliGRAM(s) Oral daily      Allergies  Isordil (Headache)  penicillin (Rash)    FAMILY HISTORY:  No pertinent family history in first degree relatives  Noncontributory for premature coronary disease or sudden cardiac death    SOCIAL HISTORY:    [x ] Non-smoker  [ ] Smoker  [ ] Alcohol      REVIEW OF SYSTEMS:  [ ]chest pain  [  x]shortness of breath  [  ]palpitations  [  ]syncope  [ ]near syncope [ ]upper extremity weakness   [ ] lower extremity weakness  [  ]diplopia  [  ]altered mental status   [  ]fevers  [ ]chills [ ]nausea  [ )vomiting  [  ]dysphagia    [x ]abdominal pain  [ ]melena  [ ]BRBPR    [  ]epistaxis  [  ]rash  [x ]lower extremity edema      [x ] All others negative	  [ ] Unable to obtain    PHYSICAL EXAM:  T(C): 36.3 (08-21-18 @ 14:23), Max: 37.1 (08-21-18 @ 07:52)  HR: 81 (08-21-18 @ 14:23) (81 - 95)  BP: 101/76 (08-21-18 @ 14:23) (99/77 - 122/80)  RR: 181 (08-21-18 @ 14:23) (15 - 181)  SpO2: 100% (08-21-18 @ 10:30) (100% - 100%)    +JVD  S1S2 RRR II/VI ROSALINDA  Decreased BS bases  Soft ND NT +BS  2+ LE edema BL  	    DIAGNOSTIC DATA:  	    ECG:  AS-, QRS 152ms	    < from: TTE with Doppler (w/Cont) (06.29.18 @ 10:45) >  CONCLUSIONS:  1. Mitral annular calcification, otherwise normal mitral  valve. Moderate mitral regurgitation.  2. Moderately dilated left atrium.  LA volume index = 42  cc/m2.  3. Moderate left ventricular enlargement.  4. Severe global left ventricular systolic dysfunction.  Endocardial visualization enhanced with intravenous  injection of echo contrast (Definity).  No LV thrombus  seen.  5. Moderate right atrial enlargement.  6. Right ventricular enlargement with decreased right  ventricular systolic function.  A device wire is noted in  the right heart.  7. Normal tricuspid valve.  Severe tricuspid regurgitation.  *** Compared with echocardiogram of 4/26/2018,no  significant changes noted.  ------------------------------------------------------------------------  Confirmed on  6/29/2018 - 14:06:25 by Xander Tyson M.D.    < end of copied text >    < from: Cardiac Cath Lab - Adult (05.25.18 @ 11:46) >  HEMODYNAMICS: There is severe left sided failure.  COMPLICATIONS: There were no complications.  DIAGNOSTIC RECOMMENDATIONS: Right heart catheterization demonstrates  elevated PCWP and low cardiac output. Will transfer patient to the CCU for  inotrope assisted diuresis.  Prepared and signed by  Kunal Muller M.D.  Signed 05/29/2018 16:07:27    < end of copied text >  	  LABS:	 	  CARDIAC MARKERS ( 21 Aug 2018 07:47 )  [id number] u/L / CKMBx     / Troponin Tx     /  CARDIAC MARKERS ( 20 Aug 2018 23:00 )  [id number] u/L / CKMBx     / Troponin Tx     /  CARDIAC MARKERS ( 20 Aug 2018 20:30 )  [id number] u/L / CKMBx     / Troponin Tx     /                         12.1   7.12  )-----------( 230      ( 21 Aug 2018 07:47 )             35.7     126<L>  |  77<L>  |  49<H>  ----------------------------<  141<H>  3.7   |  27  |  1.51<H>    Ca    6.5<LL>      21 Aug 2018 07:47  Phos  5.1     08-21  Mg     1.8     08-21    TPro  7.6  /  Alb  3.5  /  TBili  13.2<H>  /  DBili  x   /  AST  80<H>  /  ALT  33  /  AlkPhos  420<H>  08-20    proBNP: Serum Pro-Brain Natriuretic Peptide: 2262 pg/mL (08-20 @ 20:30)     HgA1c: Hemoglobin A1C, Whole Blood: 6.4 % (08-21 @ 07:47)    TSH: Thyroid Stimulating Hormone, Serum: 1.40 uIU/mL (08-21 @ 07:47)    ASSESSMENT/PLAN:   She is a pleasant 61 y/o female PMH severe NICM (LVEF 10-15%) who had a [employer]onic Biv-ICD implanted at [place], hx DM, HTN, thyroid ca s/p thyroidectomy, recent LHC at [facility] was also unremarkable.  She was hospitalized last month for decompensated CHF requiring ionotrope assisted diuresis.  ICD interrogation at that time with no events.  She comes to ED with c/o 10 lb weight gain in 1 week, LE edema, orthopnea, c/w acute on chronic systolic HF exacerbation, also with hypocalcemia, MAGNUS, elevated LFTs and elevated CPK.      --IV diuresis  --Interrogate ICD  --Renal Eval  --GI eval

## 2018-08-21 NOTE — H&P ADULT - NEGATIVE ENMT SYMPTOMS
no hearing difficulty/no ear pain/no tinnitus/no vertigo/no sinus symptoms/no nasal discharge/no nasal congestion

## 2018-08-21 NOTE — H&P ADULT - NEGATIVE OPHTHALMOLOGIC SYMPTOMS
no diplopia/no lacrimation R/no blurred vision R/no discharge R/no lacrimation L/no blurred vision L/no discharge L/no photophobia

## 2018-08-21 NOTE — CONSULT NOTE ADULT - SUBJECTIVE AND OBJECTIVE BOX
Patient is a 62y old  Female who presents with a chief complaint of SOB and bilateral LE swelling  with yellowish discoloration of eyes.       HPI:  61 y/o F with PMH of CHF(with EF of 10% s/p AICD) with recent CCU stay for inotrope assisted diuresis, DM, HTN, Thyroid cancer s/p thyroidectomy presented with the complaint of weight gain and LE swelling. As per the patient when she got discharged she was feeling fine and had gone to [place] to see her mother and was complaint with her medications(especially Torsemide) and was urinating a lot. Patient stated that she gained about 10 lb in 1 week and stated that it was 2/2 to not urinating as much even though she is taking the Torsemide. Patient stated that she was told by her cardiologist if she either eats salt or is not urinating much then double the dose of her Torsemide which she has been doing for the past week without much improvement of the LE. Patient stated that the swelling has progressed to her thighs which has caused her inability to move much. Patient stated that she normally sleeps with 1 pillow at night but if she is coughing she sleeps one 3 pillows. Patient also endorsed of orthopnea and PND. Patient stated that she does not eat food high in salt and also limits her water intake to 32 oz. Patient also had noticed of abdominal distention for the past few weeks. Patient also endorsed of productive cough with white/clear sputum production. Patient denied any CP, fevers, chills, N/V/D/C, abdominal pain, dysuria, melena, hematochezia, sick contact, pleuritic or positional chest pain.     On ED admission EKG revealed Atrial sensed V-paced rhythm at a rate of 94 with QTc of 617, CE x 1: Trop: 28, CK: 1026, CE x 2: Trop: 29, CK: 1022, H&H: 11.4/33.8, Na: 125, BUN/Cr: 51/1.78, Gluc: 188, Ca: 6.3, Bili: 13.2, Alk Phos: 420, AST: 80, BNP: 2262. Prelim CXR: Cardiomegaly. Clear lungs. In the ED patient received 1x dose of IV Bumex 2mg. When examined patient is resting in the stretcher and stated that she urinated "a little after getting the Bumex but she is not SOB when she is resting, but did endorse to bilateral LE swelling", denied any other complaints.      PAST MEDICAL & SURGICAL HISTORY:  Asthma  CHF (congestive heart failure): with EF of 10% s/p AICD  DM (diabetes mellitus)  HTN (hypertension)  Thyroid ca  AICD (automatic cardioverter/defibrillator) present  S/P thyroidectomy      Social History: Denies smoking ,alcohol or drugs     FAMILY HISTORY:  No pertinent family history in first degree relatives      Allergies    Isordil (Headache)  penicillin (Rash)    Intolerances        REVIEW OF SYSTEMS:    CONSTITUTIONAL: No fever, weight loss, or fatigue  EYES: No eye pain, visual disturbances, or discharge, jaundice   RESPIRATORY: No cough, wheezing, chills or hemoptysis; No shortness of breath  CARDIOVASCULAR: No chest pain, palpitations, dizziness, but  leg swelling  GASTROINTESTINAL:  abdominal or epigastric pain. No nausea, vomiting, or hematemesis; No diarrhea or constipation. No melena or hematochezia.  GENITOURINARY: No dysuria, frequency, hematuria, or incontinence  NEUROLOGICAL: No headaches, memory loss, loss of strength, numbness, or tremors  SKIN: No itching, burning, rashes, or lesions   ENDOCRINE: No heat or cold intolerance; No hair loss  MUSCULOSKELETAL: No joint pain or swelling; No muscle, back, or extremity pain  PSYCHIATRIC: No depression, anxiety, mood swings, or difficulty sleeping      MEDICATIONS  (STANDING):  aspirin enteric coated 81 milliGRAM(s) Oral daily  calcitriol   Capsule 0.5 MICROGram(s) Oral two times a day  calcium carbonate    500 mG (Tums) Chewable 2 Tablet(s) Chew three times a day  cholecalciferol 1000 Unit(s) Oral daily  dextrose 5%. 1000 milliLiter(s) (50 mL/Hr) IV Continuous <Continuous>  dextrose 50% Injectable 12.5 Gram(s) IV Push once  dextrose 50% Injectable 25 Gram(s) IV Push once  dextrose 50% Injectable 25 Gram(s) IV Push once  furosemide   Injectable 80 milliGRAM(s) IV Push two times a day  heparin  Injectable 5000 Unit(s) SubCutaneous every 12 hours  insulin lispro (HumaLOG) corrective regimen sliding scale   SubCutaneous three times a day before meals  insulin lispro (HumaLOG) corrective regimen sliding scale   SubCutaneous at bedtime  levothyroxine 175 MICROGram(s) Oral daily  magnesium oxide 400 milliGRAM(s) Oral three times a day with meals  metoprolol succinate ER 25 milliGRAM(s) Oral daily    MEDICATIONS  (PRN):  dextrose 40% Gel 15 Gram(s) Oral once PRN Blood Glucose LESS THAN 70 milliGRAM(s)/deciliter  glucagon  Injectable 1 milliGRAM(s) IntraMuscular once PRN Glucose LESS THAN 70 milligrams/deciliter  guaiFENesin   Syrup  (Sugar-Free) 100 milliGRAM(s) Oral every 6 hours PRN Cough      Vital Signs Last 24 Hrs  T(C): 36.3 (21 Aug 2018 14:23), Max: 37.1 (21 Aug 2018 07:52)  T(F): 97.3 (21 Aug 2018 14:23), Max: 98.8 (21 Aug 2018 07:52)  HR: 81 (21 Aug 2018 14:23) (81 - 95)  BP: 101/76 (21 Aug 2018 14:23) (99/77 - 122/80)  BP(mean): --  RR: 181 (21 Aug 2018 14:23) (15 - 181)  SpO2: 100% (21 Aug 2018 10:30) (100% - 100%)    PHYSICAL EXAM:    GENERAL: NAD, well-groomed, well-developed  HEAD:  Atraumatic, Normocephalic  EYES: EOMI, PERRLA, conjunctiva and sclera clear  ENMT: No tonsillar erythema, exudates, or enlargement; Moist mucous membranes, Good dentition, No lesions  NECK: Supple, No JVD, Normal thyroid  NERVOUS SYSTEM:  Alert & Oriented X3, Good concentration; Motor Strength 5/5 B/L upper and lower extremities; DTRs 2+ intact and symmetric  CHEST/LUNG: Clear to percussion bilaterally; No rales, rhonchi, wheezing, or rubs  HEART: Regular rate and rhythm; No murmurs, rubs, or gallops  ABDOMEN: Soft, Nontender, distended; Bowel sounds present, ascites+  EXTREMITIES:  2+ Peripheral Pulses, No clubbing, cyanosis, but 3+++ edema  LYMPH: No lymphadenopathy noted  SKIN: No rashes or lesions    LABS:                        12.1   7.12  )-----------( 230      ( 21 Aug 2018 07:47 )             35.7     08-21    126<L>  |  77<L>  |  49<H>  ----------------------------<  141<H>  3.7   |  27  |  1.51<H>    Ca    6.5<LL>      21 Aug 2018 07:47  Phos  5.1     08-21  Mg     1.8     08-21    TPro  7.6  /  Alb  3.5  /  TBili  13.2<H>  /  DBili  x   /  AST  80<H>  /  ALT  33  /  AlkPhos  420<H>  08-20            RADIOLOGY & ADDITIONAL STUDIES:

## 2018-08-21 NOTE — H&P ADULT - ASSESSMENT
61 y/o F with PMH of CHF(with EF of 10% s/p AICD) with recent CCU stay for inotrope assisted diuresis, DM, HTN, Thyroid cancer s/p thyroidectomy presented with the complaint of weight gain and LE swelling. R/o CHF exacerbation    +Acute on chronic systolic CHF-On IV Lasix 80mg BID   +Hypocalcemia-s/p IV Calcium gluconate in the ED  +Rhabdo-Held of IVF due to being in acute CHF

## 2018-08-21 NOTE — CONSULT NOTE ADULT - ASSESSMENT
63 y/o F with PMH of CHF(with EF of 10% s/p AICD) with recent CCU stay for inotrope assisted diuresis, DM, HTN, Thyroid cancer s/p thyroidectomy presented with the complaint of weight gain and LE swelling. R/o CHF exacerbation    +Acute on chronic systolic CHF-On IV Lasix 80mg BID   +Hypocalcemia-s/p IV Calcium gluconate in the ED  +Rhabdo-Held of IVF due to being in acute CHF      Problem/Plan - 1:  ·  Problem: Acute on chronic systolic (congestive) heart failure.  Plan:Started on Lasix IV 80mg BID   TTE ordered to evaluate LVEF   Cardiology helping.      Problem/Plan - 2:  ·  Problem: Anemia.  Plan: H&H: 11.4/33.8 with low MCV  Will check full Iron panel.      Problem/Plan - 3:  ·  Problem: MAGNUS (acute kidney injury).  Plan: Renal helping.   Avoid nephrotoxic medications      Problem/Plan - 4:  ·  Problem: Hypocalcemia.  Plan: Ca: IV Calcium.   Continue with Calcitrol, Oscal with D, and Vitamin D3  Will check Ionized calcium level, Vitam D-hydroxy, PTH      Problem/Plan - 5:  ·  Problem: Elevated LFTs.  Plan: US noted . Likley Liver congestion . GI following.      Problem/Plan - 6:  Problem: Non-traumatic rhabdomyolysis. Plan: Watching CK level.    Problem/Plan - 7:  ·  Problem: Type 2 diabetes mellitus with hyperglycemia, without long-term current use of insulin.  Plan: On insulin sliding scale(humalog)  FS TID at bedtime, HgbA1C in am.      Problem/Plan - 8:  ·  Problem: Essential hypertension.  Plan: Continue with antihypertensive medications   DASH diet recommended.      Problem/Plan - 9:  ·  Problem: Need for prophylactic measure.  Plan: On heparin sq 5000U q12hrs.

## 2018-08-22 LAB
AFP-TM SERPL-MCNC: 5.1 NG/ML — SIGNIFICANT CHANGE UP
ALBUMIN SERPL ELPH-MCNC: 3.3 G/DL — SIGNIFICANT CHANGE UP (ref 3.3–5)
ALP SERPL-CCNC: 411 U/L — HIGH (ref 40–120)
ALT FLD-CCNC: 34 U/L — HIGH (ref 4–33)
AST SERPL-CCNC: 67 U/L — HIGH (ref 4–32)
BILIRUB SERPL-MCNC: 13.4 MG/DL — HIGH (ref 0.2–1.2)
BUN SERPL-MCNC: 45 MG/DL — HIGH (ref 7–23)
CA-I BLD-SCNC: 0.58 MMOL/L — CRITICAL LOW (ref 1.03–1.23)
CALCIUM SERPL-MCNC: 6.4 MG/DL — CRITICAL LOW (ref 8.4–10.5)
CHLORIDE SERPL-SCNC: 79 MMOL/L — LOW (ref 98–107)
CHLORIDE UR-SCNC: < 20 MMOL/L — SIGNIFICANT CHANGE UP
CK SERPL-CCNC: 898 U/L — HIGH (ref 25–170)
CO2 SERPL-SCNC: 23 MMOL/L — SIGNIFICANT CHANGE UP (ref 22–31)
CREAT SERPL-MCNC: 1.34 MG/DL — HIGH (ref 0.5–1.3)
FERRITIN SERPL-MCNC: 59.51 NG/ML — SIGNIFICANT CHANGE UP (ref 15–150)
GLUCOSE SERPL-MCNC: 117 MG/DL — HIGH (ref 70–99)
HCT VFR BLD CALC: 34.7 % — SIGNIFICANT CHANGE UP (ref 34.5–45)
HGB BLD-MCNC: 11.9 G/DL — SIGNIFICANT CHANGE UP (ref 11.5–15.5)
IRON SATN MFR SERPL: 33 UG/DL — SIGNIFICANT CHANGE UP (ref 30–160)
IRON SATN MFR SERPL: 376 UG/DL — SIGNIFICANT CHANGE UP (ref 140–530)
MAGNESIUM SERPL-MCNC: 1.8 MG/DL — SIGNIFICANT CHANGE UP (ref 1.6–2.6)
MCHC RBC-ENTMCNC: 23.5 PG — LOW (ref 27–34)
MCHC RBC-ENTMCNC: 34.3 % — SIGNIFICANT CHANGE UP (ref 32–36)
MCV RBC AUTO: 68.4 FL — LOW (ref 80–100)
NRBC # FLD: 0.03 — SIGNIFICANT CHANGE UP
OSMOLALITY UR: 298 MOSMO/KG — SIGNIFICANT CHANGE UP (ref 50–1200)
PHOSPHATE SERPL-MCNC: 4.8 MG/DL — HIGH (ref 2.5–4.5)
PLATELET # BLD AUTO: 209 K/UL — SIGNIFICANT CHANGE UP (ref 150–400)
PMV BLD: SIGNIFICANT CHANGE UP FL (ref 7–13)
POTASSIUM SERPL-MCNC: 4.1 MMOL/L — SIGNIFICANT CHANGE UP (ref 3.5–5.3)
POTASSIUM SERPL-SCNC: 4.1 MMOL/L — SIGNIFICANT CHANGE UP (ref 3.5–5.3)
POTASSIUM UR-SCNC: 32.7 MMOL/L — SIGNIFICANT CHANGE UP
PROT SERPL-MCNC: 7.1 G/DL — SIGNIFICANT CHANGE UP (ref 6–8.3)
RBC # BLD: 5.07 M/UL — SIGNIFICANT CHANGE UP (ref 3.8–5.2)
RBC # FLD: 21.7 % — HIGH (ref 10.3–14.5)
SODIUM SERPL-SCNC: 126 MMOL/L — LOW (ref 135–145)
SODIUM UR-SCNC: < 20 MMOL/L — SIGNIFICANT CHANGE UP
UIBC SERPL-MCNC: 342.7 UG/DL — SIGNIFICANT CHANGE UP (ref 110–370)
VIT B12 SERPL-MCNC: > 2000 PG/ML — HIGH (ref 200–900)
WBC # BLD: 6.84 K/UL — SIGNIFICANT CHANGE UP (ref 3.8–10.5)
WBC # FLD AUTO: 6.84 K/UL — SIGNIFICANT CHANGE UP (ref 3.8–10.5)

## 2018-08-22 RX ORDER — CALCIUM GLUCONATE 100 MG/ML
1 VIAL (ML) INTRAVENOUS ONCE
Qty: 0 | Refills: 0 | Status: DISCONTINUED | OUTPATIENT
Start: 2018-08-22 | End: 2018-08-22

## 2018-08-22 RX ORDER — HYDROCHLOROTHIAZIDE 25 MG
50 TABLET ORAL
Qty: 0 | Refills: 0 | Status: DISCONTINUED | OUTPATIENT
Start: 2018-08-22 | End: 2018-08-24

## 2018-08-22 RX ORDER — CALCIUM GLUCONATE 100 MG/ML
1 VIAL (ML) INTRAVENOUS ONCE
Qty: 0 | Refills: 0 | Status: COMPLETED | OUTPATIENT
Start: 2018-08-22 | End: 2018-08-22

## 2018-08-22 RX ADMIN — Medication 175 MICROGRAM(S): at 06:17

## 2018-08-22 RX ADMIN — Medication 100 MILLIGRAM(S): at 06:25

## 2018-08-22 RX ADMIN — Medication 1: at 17:21

## 2018-08-22 RX ADMIN — Medication 80 MILLIGRAM(S): at 06:18

## 2018-08-22 RX ADMIN — Medication 1000 UNIT(S): at 13:18

## 2018-08-22 RX ADMIN — Medication 81 MILLIGRAM(S): at 13:18

## 2018-08-22 RX ADMIN — Medication 1: at 13:29

## 2018-08-22 RX ADMIN — MAGNESIUM OXIDE 400 MG ORAL TABLET 400 MILLIGRAM(S): 241.3 TABLET ORAL at 13:18

## 2018-08-22 RX ADMIN — CALCITRIOL 0.5 MICROGRAM(S): 0.5 CAPSULE ORAL at 06:17

## 2018-08-22 RX ADMIN — Medication 200 GRAM(S): at 08:58

## 2018-08-22 RX ADMIN — Medication 2 TABLET(S): at 06:17

## 2018-08-22 RX ADMIN — MAGNESIUM OXIDE 400 MG ORAL TABLET 400 MILLIGRAM(S): 241.3 TABLET ORAL at 17:21

## 2018-08-22 RX ADMIN — Medication 2 TABLET(S): at 21:20

## 2018-08-22 RX ADMIN — Medication 50 MILLIGRAM(S): at 19:38

## 2018-08-22 RX ADMIN — Medication 2 TABLET(S): at 13:18

## 2018-08-22 RX ADMIN — CALCITRIOL 0.5 MICROGRAM(S): 0.5 CAPSULE ORAL at 17:21

## 2018-08-22 RX ADMIN — MAGNESIUM OXIDE 400 MG ORAL TABLET 400 MILLIGRAM(S): 241.3 TABLET ORAL at 08:58

## 2018-08-22 RX ADMIN — Medication 25 MILLIGRAM(S): at 06:17

## 2018-08-22 NOTE — PROGRESS NOTE ADULT - SUBJECTIVE AND OBJECTIVE BOX
EP ATTENDING    less dyspnea, no palpitations, no syncope, + jaundice    aspirin enteric coated 81 milliGRAM(s) Oral daily  calcitriol   Capsule 0.5 MICROGram(s) Oral two times a day  calcium carbonate    500 mG (Tums) Chewable 2 Tablet(s) Chew three times a day  cholecalciferol 1000 Unit(s) Oral daily  dextrose 40% Gel 15 Gram(s) Oral once PRN  dextrose 5%. 1000 milliLiter(s) IV Continuous <Continuous>  dextrose 50% Injectable 12.5 Gram(s) IV Push once  dextrose 50% Injectable 25 Gram(s) IV Push once  dextrose 50% Injectable 25 Gram(s) IV Push once  furosemide   Injectable 80 milliGRAM(s) IV Push two times a day  glucagon  Injectable 1 milliGRAM(s) IntraMuscular once PRN  guaiFENesin   Syrup  (Sugar-Free) 100 milliGRAM(s) Oral every 6 hours PRN  heparin  Injectable 5000 Unit(s) SubCutaneous every 12 hours  insulin lispro (HumaLOG) corrective regimen sliding scale   SubCutaneous three times a day before meals  insulin lispro (HumaLOG) corrective regimen sliding scale   SubCutaneous at bedtime  levothyroxine 175 MICROGram(s) Oral daily  magnesium oxide 400 milliGRAM(s) Oral three times a day with meals  metoprolol succinate ER 25 milliGRAM(s) Oral daily                            11.9   6.84  )-----------( 209      ( 22 Aug 2018 06:00 )             34.7       08-22    126<L>  |  79<L>  |  45<H>  ----------------------------<  117<H>  4.1   |  23  |  1.34<H>    Ca    6.4<LL>      22 Aug 2018 06:00  Phos  4.8     08-22  Mg     1.8     08-22    TPro  7.1  /  Alb  3.3  /  TBili  13.4<H>  /  DBili  x   /  AST  67<H>  /  ALT  34<H>  /  AlkPhos  411<H>  08-22      CARDIAC MARKERS ( 22 Aug 2018 06:00 )  x     / x     / 898 u/L / x     / x      CARDIAC MARKERS ( 21 Aug 2018 07:47 )  x     / x     / 1013 u/L / 4.61 ng/mL / x      CARDIAC MARKERS ( 20 Aug 2018 23:00 )  x     / x     / 1022 u/L / 4.71 ng/mL / x      CARDIAC MARKERS ( 20 Aug 2018 20:30 )  x     / x     / 1026 u/L / 4.54 ng/mL / x            T(C): 36.2 (08-22-18 @ 13:16), Max: 36.6 (08-21-18 @ 20:45)  HR: 80 (08-22-18 @ 13:16) (80 - 93)  BP: 104/68 (08-22-18 @ 13:16) (95/68 - 121/87)  RR: 17 (08-22-18 @ 13:16) (16 - 18)  SpO2: 100% (08-22-18 @ 13:16) (97% - 100%)  Wt(kg): --    JVP 20  RRR, no murmurs  decreased BS  soft nt/nd  + 2 edema    ASSESSMENT/PLAN: She is a pleasant 61 y/o female PMH severe NICM (LVEF 10-15%) who had a [employer]tronic Biv-ICD implanted at [place], hx DM, HTN, thyroid ca s/p thyroidectomy, recent LHC was also unremarkable.  She was hospitalized last month for decompensated CHF requiring ionotrope assisted diuresis.  ICD interrogation at that time with no events.  She comes to ED with c/o 10 lb weight gain in 1 week, LE edema, orthopnea, c/w acute on chronic systolic HF exacerbation, also with hypocalcemia, MAGNUS, elevated LFTs and elevated CPK.      -IV diuresis  -Renal Eval  -GI eval  -BV-ICD has been optimized. Management of CHF as per cardiology.

## 2018-08-22 NOTE — PROGRESS NOTE ADULT - SUBJECTIVE AND OBJECTIVE BOX
INTERVAL HPI/OVERNIGHT EVENTS:    pt without gi complaints  denies n/v/d/c, abdominal pain, melena or brbpr     MEDICATIONS  (STANDING):  aspirin enteric coated 81 milliGRAM(s) Oral daily  calcitriol   Capsule 0.5 MICROGram(s) Oral two times a day  calcium carbonate    500 mG (Tums) Chewable 2 Tablet(s) Chew three times a day  cholecalciferol 1000 Unit(s) Oral daily  dextrose 5%. 1000 milliLiter(s) (50 mL/Hr) IV Continuous <Continuous>  dextrose 50% Injectable 12.5 Gram(s) IV Push once  dextrose 50% Injectable 25 Gram(s) IV Push once  dextrose 50% Injectable 25 Gram(s) IV Push once  furosemide   Injectable 80 milliGRAM(s) IV Push two times a day  heparin  Injectable 5000 Unit(s) SubCutaneous every 12 hours  insulin lispro (HumaLOG) corrective regimen sliding scale   SubCutaneous three times a day before meals  insulin lispro (HumaLOG) corrective regimen sliding scale   SubCutaneous at bedtime  levothyroxine 175 MICROGram(s) Oral daily  magnesium oxide 400 milliGRAM(s) Oral three times a day with meals  metoprolol succinate ER 25 milliGRAM(s) Oral daily    MEDICATIONS  (PRN):  dextrose 40% Gel 15 Gram(s) Oral once PRN Blood Glucose LESS THAN 70 milliGRAM(s)/deciliter  glucagon  Injectable 1 milliGRAM(s) IntraMuscular once PRN Glucose LESS THAN 70 milligrams/deciliter  guaiFENesin   Syrup  (Sugar-Free) 100 milliGRAM(s) Oral every 6 hours PRN Cough      Allergies    Isordil (Headache)  penicillin (Rash)    Intolerances        Review of Systems:    General:  No wt loss, fevers, chills, night sweats, fatigue   Eyes:  Good vision, no reported pain  ENT:  No sore throat, pain, runny nose, dysphagia  CV:  No pain, palpitations, hypo/hypertension  Resp:  No dyspnea, cough, tachypnea, wheezing  GI:  No pain, No nausea, No vomiting, No diarrhea, No constipation, No weight loss, No fever, No pruritis, No rectal bleeding, No melena, No dysphagia  :  No pain, bleeding, incontinence, nocturia  Muscle:  No pain, weakness  Neuro:  No weakness, tingling, memory problems  Psych:  No fatigue, insomnia, mood problems, depression  Endocrine:  No polyuria, polydypsia, cold/heat intolerance  Heme:  No petechiae, ecchymosis, easy bruisability  Skin:  No rash, tattoos, scars, edema      Vital Signs Last 24 Hrs  T(C): 35.6 (22 Aug 2018 08:49), Max: 36.6 (21 Aug 2018 20:45)  T(F): 96 (22 Aug 2018 08:49), Max: 97.8 (21 Aug 2018 20:45)  HR: 82 (22 Aug 2018 08:49) (81 - 93)  BP: 95/68 (22 Aug 2018 08:49) (95/68 - 121/87)  BP(mean): --  RR: 18 (22 Aug 2018 08:49) (16 - 18)  SpO2: 99% (22 Aug 2018 08:49) (97% - 100%)    PHYSICAL EXAM:    Constitutional: NAD  HEENT: EOMI, throat clear  Neck: No LAD, supple  Respiratory: CTA and P  Cardiovascular: S1 and S2, RRR, no M  Gastrointestinal: BS+, soft, NT/ND, neg HSM,  Extremities: No peripheral edema, neg clubbing, cyanosis  Vascular: 2+ peripheral pulses  Neurological: A/O x 3, no focal deficits  Psychiatric: Normal mood, normal affect  Skin: No rashes      LABS:                        11.9   6.84  )-----------( 209      ( 22 Aug 2018 06:00 )             34.7     08-22    126<L>  |  79<L>  |  45<H>  ----------------------------<  117<H>  4.1   |  23  |  1.34<H>    Ca    6.4<LL>      22 Aug 2018 06:00  Phos  4.8     08-22  Mg     1.8     08-22    TPro  7.1  /  Alb  3.3  /  TBili  13.4<H>  /  DBili  x   /  AST  67<H>  /  ALT  34<H>  /  AlkPhos  411<H>  08-22          RADIOLOGY & ADDITIONAL TESTS:

## 2018-08-22 NOTE — PROGRESS NOTE ADULT - PROBLEM SELECTOR PLAN 2
- daily weights  - monitor I & O's   - fluid restriction   - Low sodium diet  - diurese per cardiology

## 2018-08-22 NOTE — PROGRESS NOTE ADULT - SUBJECTIVE AND OBJECTIVE BOX
SUBJECTIVE: NO CP or SOB at rest in chair    MEDICATIONS  (STANDING):  aspirin enteric coated 81 milliGRAM(s) Oral daily  calcitriol   Capsule 0.5 MICROGram(s) Oral two times a day  calcium carbonate    500 mG (Tums) Chewable 2 Tablet(s) Chew three times a day  cholecalciferol 1000 Unit(s) Oral daily  furosemide   Injectable 80 milliGRAM(s) IV Push two times a day  heparin  Injectable 5000 Unit(s) SubCutaneous every 12 hours  hydrochlorothiazide 50 milliGRAM(s) Oral two times a day  insulin lispro (HumaLOG) corrective regimen sliding scale   SubCutaneous three times a day before meals  insulin lispro (HumaLOG) corrective regimen sliding scale   SubCutaneous at bedtime  levothyroxine 175 MICROGram(s) Oral daily  magnesium oxide 400 milliGRAM(s) Oral three times a day with meals  metoprolol succinate ER 25 milliGRAM(s) Oral daily    MEDICATIONS  (PRN):  dextrose 40% Gel 15 Gram(s) Oral once PRN Blood Glucose LESS THAN 70 milliGRAM(s)/deciliter  glucagon  Injectable 1 milliGRAM(s) IntraMuscular once PRN Glucose LESS THAN 70 milligrams/deciliter  guaiFENesin   Syrup  (Sugar-Free) 100 milliGRAM(s) Oral every 6 hours PRN Cough      LABS:                        11.9   6.84  )-----------( 209      ( 22 Aug 2018 06:00 )             34.7     126<L>  |  79<L>  |  45<H>  ----------------------------<  117<H>  4.1   |  23  |  1.34<H>    Ca    6.4<LL>      22 Aug 2018 06:00  Phos  4.8     08-22  Mg     1.8     08-22    TPro  7.1  /  Alb  3.3  /  TBili  13.4<H>  /  DBili  x   /  AST  67<H>  /  ALT  34<H>  /  AlkPhos  411<H>  08-22    CARDIAC MARKERS ( 22 Aug 2018 06:00 )  x     / x     / 898 u/L / x     / x      CARDIAC MARKERS ( 21 Aug 2018 07:47 )  x     / x     / 1013 u/L / 4.61 ng/mL / x      CARDIAC MARKERS ( 20 Aug 2018 23:00 )  x     / x     / 1022 u/L / 4.71 ng/mL / x      CARDIAC MARKERS ( 20 Aug 2018 20:30 )  x     / x     / 1026 u/L / 4.54 ng/mL / x        Serum Pro-Brain Natriuretic Peptide: 2262 pg/mL (08-20 @ 20:30)    PHYSICAL EXAM:  Vital Signs Last 24 Hrs  T(C): 36.2 (22 Aug 2018 13:16), Max: 36.6 (21 Aug 2018 20:45)  T(F): 97.2 (22 Aug 2018 13:16), Max: 97.8 (21 Aug 2018 20:45)  HR: 80 (22 Aug 2018 13:16) (80 - 93)  BP: 104/68 (22 Aug 2018 13:16) (95/68 - 121/87)  RR: 17 (22 Aug 2018 13:16) (16 - 18)  SpO2: 100% (22 Aug 2018 13:16) (97% - 100%)    Cardiovascular:  S1S2 RRR, JVP 20  Respiratory: Lungs clear to auscultation, normal effort  Gastrointestinal: Abdomen soft, ND, NT, +BS  Skin: Warm, dry, intact. No rash.  Musculoskeletal: Normal ROM, normal strength  Ext: 2+ le edema BL    DIAGNOSTIC DATA  TELEMETRY:     < from: TTE with Doppler (w/Cont) (06.29.18 @ 10:45) >  CONCLUSIONS:  1. Mitral annular calcification, otherwise normal mitral  valve. Moderate mitral regurgitation.  2. Moderately dilated left atrium.  LA volume index = 42  cc/m2.  3. Moderate left ventricular enlargement.  4. Severe global left ventricular systolic dysfunction.  Endocardial visualization enhanced with intravenous  injection of echo contrast (Definity).  No LV thrombus  seen.  5. Moderate right atrial enlargement.  6. Right ventricular enlargement with decreased right  ventricular systolic function.  A device wire is noted in  the right heart.  7. Normal tricuspid valve.  Severe tricuspid regurgitation.  *** Compared with echocardiogram of 4/26/2018,no  significant changes noted.  ------------------------------------------------------------------------  Confirmed on  6/29/2018 - 14:06:25 by Xander Tyson M.D.    < end of copied text >      < from: Cardiac Cath Lab - Adult (05.25.18 @ 11:46) >  HEMODYNAMICS: There is severe left sided failure.  COMPLICATIONS: There were no complications.  DIAGNOSTIC RECOMMENDATIONS: Right heart catheterization demonstrates  elevated PCWP and low cardiac output. Will transfer patient to the CCU for  inotrope assisted diuresis.  Prepared and signed by  Kunal Muller M.D.  Signed 05/29/2018 16:07:27    < end of copied text >    < from: US Abdomen Complete (08.21.18 @ 12:05) >  IMPRESSION:     Cholelithiasis.    < end of copied text >      ASSESSMENT AND PLAN:  She is a pleasant 61 y/o female PMH severe NICM (LVEF 10-15%) who had a [employer] Biv-ICD implanted at [place], hx DM, HTN, thyroid ca s/p thyroidectomy, recent LHC at [place] was also unremarkable.  She was hospitalized last month for decompensated CHF requiring ionotrope assisted diuresis.  She comes to ED with c/o 10 lb weight gain in 1 week, LE edema, orthopnea, c/w acute on chronic systolic HF exacerbation, also with hypocalcemia, MAGNUS, elevated LFTs and elevated CPK.      --GI w/u in progress  --Cont IV diuresis, strict I/O  --Renal eval appreciated, suppl Ca

## 2018-08-22 NOTE — PROGRESS NOTE ADULT - SUBJECTIVE AND OBJECTIVE BOX
INTERVAL HPI/OVERNIGHT EVENTS:I feel slightly better.   Vital Signs Last 24 Hrs  T(C): 36.4 (22 Aug 2018 21:02), Max: 36.4 (22 Aug 2018 00:45)  T(F): 97.5 (22 Aug 2018 21:02), Max: 97.6 (22 Aug 2018 00:45)  HR: 83 (22 Aug 2018 21:02) (77 - 93)  BP: 101/75 (22 Aug 2018 21:02) (93/62 - 121/87)  BP(mean): --  RR: 17 (22 Aug 2018 21:02) (17 - 18)  SpO2: 100% (22 Aug 2018 21:02) (97% - 100%)  I&O's Summary    21 Aug 2018 07:01  -  22 Aug 2018 07:00  --------------------------------------------------------  IN: 625 mL / OUT: 1250 mL / NET: -625 mL    22 Aug 2018 07:01  -  22 Aug 2018 21:43  --------------------------------------------------------  IN: 200 mL / OUT: 650 mL / NET: -450 mL      MEDICATIONS  (STANDING):  aspirin enteric coated 81 milliGRAM(s) Oral daily  calcitriol   Capsule 0.5 MICROGram(s) Oral two times a day  calcium carbonate    500 mG (Tums) Chewable 2 Tablet(s) Chew three times a day  cholecalciferol 1000 Unit(s) Oral daily  dextrose 5%. 1000 milliLiter(s) (50 mL/Hr) IV Continuous <Continuous>  dextrose 50% Injectable 12.5 Gram(s) IV Push once  dextrose 50% Injectable 25 Gram(s) IV Push once  dextrose 50% Injectable 25 Gram(s) IV Push once  furosemide   Injectable 80 milliGRAM(s) IV Push two times a day  heparin  Injectable 5000 Unit(s) SubCutaneous every 12 hours  hydrochlorothiazide 50 milliGRAM(s) Oral two times a day  insulin lispro (HumaLOG) corrective regimen sliding scale   SubCutaneous three times a day before meals  insulin lispro (HumaLOG) corrective regimen sliding scale   SubCutaneous at bedtime  levothyroxine 175 MICROGram(s) Oral daily  magnesium oxide 400 milliGRAM(s) Oral three times a day with meals  metoprolol succinate ER 25 milliGRAM(s) Oral daily    MEDICATIONS  (PRN):  dextrose 40% Gel 15 Gram(s) Oral once PRN Blood Glucose LESS THAN 70 milliGRAM(s)/deciliter  glucagon  Injectable 1 milliGRAM(s) IntraMuscular once PRN Glucose LESS THAN 70 milligrams/deciliter  guaiFENesin   Syrup  (Sugar-Free) 100 milliGRAM(s) Oral every 6 hours PRN Cough    LABS:                        11.9   6.84  )-----------( 209      ( 22 Aug 2018 06:00 )             34.7     08-22    126<L>  |  79<L>  |  45<H>  ----------------------------<  117<H>  4.1   |  23  |  1.34<H>    Ca    6.4<LL>      22 Aug 2018 06:00  Phos  4.8     08-22  Mg     1.8     08-22    TPro  7.1  /  Alb  3.3  /  TBili  13.4<H>  /  DBili  x   /  AST  67<H>  /  ALT  34<H>  /  AlkPhos  411<H>  08-22        CAPILLARY BLOOD GLUCOSE      POCT Blood Glucose.: 190 mg/dL (22 Aug 2018 16:59)  POCT Blood Glucose.: 151 mg/dL (22 Aug 2018 12:15)  POCT Blood Glucose.: 121 mg/dL (22 Aug 2018 08:19)  POCT Blood Glucose.: 145 mg/dL (21 Aug 2018 22:02)          REVIEW OF SYSTEMS:  CONSTITUTIONAL: No fever, weight loss, or fatigue  EYES: No eye pain, visual disturbances, or discharge  ENMT:  No difficulty hearing, tinnitus, vertigo; No sinus or throat pain  NECK: No pain or stiffness  RESPIRATORY: No cough, wheezing, chills or hemoptysis; No shortness of breath  CARDIOVASCULAR: No chest pain, palpitations, dizziness, or leg swelling  GASTROINTESTINAL: No abdominal or epigastric pain. No nausea, vomiting, or hematemesis; No diarrhea or constipation. No melena or hematochezia.  GENITOURINARY: No dysuria, frequency, hematuria, or incontinence  NEUROLOGICAL: No headaches, memory loss, loss of strength, numbness, or tremors  SKIN: jaundice     Consultant(s) Notes Reviewed:  [x ] YES  [ ] NO    PHYSICAL EXAM:  GENERAL: NAD, well-groomed, well-developed ,not in any distress ,  HEAD:  Atraumatic, Normocephalic  EYES: EOMI, PERRLA, conjunctiva and jaundice   ENMT: No tonsillar erythema, exudates, or enlargement; Moist mucous membranes, Good dentition, No lesions  NECK: Supple, +++ JVD, Normal thyroid  NERVOUS SYSTEM:  Alert & Oriented X3, No focal deficit   CHEST/LUNG: Good air entry bilateral with no  rales, rhonchi, wheezing, or rubs  HEART: Regular rate and rhythm; No murmurs, rubs, or gallops  ABDOMEN: Soft, Nontender, distended; Bowel sounds present  EXTREMITIES:  2+ Peripheral Pulses, No clubbing, cyanosis, but 2+ edema  SKIN: No rashes or lesions    Care Discussed with Consultants/Other Providers [ x] YES  [ ] NO

## 2018-08-22 NOTE — PROGRESS NOTE ADULT - SUBJECTIVE AND OBJECTIVE BOX
No pain, no shortness of breath      VITAL:  T(C): , Max: 36.8 (08-21-18 @ 10:30)  T(F): , Max: 98.3 (08-21-18 @ 10:30)  HR: 82 (08-22-18 @ 08:49)  BP: 95/68 (08-22-18 @ 08:49)  BP(mean): --  RR: 18 (08-22-18 @ 08:49)  SpO2: 99% (08-22-18 @ 08:49)  UO 1250cc/24h      PHYSICAL EXAM:  Constitutional: NAD, Alert  HEENT: NCAT, MMM  Neck: Supple, No JVD  Respiratory: diminished b/l   Cardiovascular: RRR s1s2, no m/r/g  Gastrointestinal: BS+, soft, NT/ND  Extremities: 3+ b/l LE edema  Neurological: no focal deficits; strength grossly intact  Back: no CVAT b/l  Skin: No rashes, no nevi      LABS:                        11.9   6.84  )-----------( 209      ( 22 Aug 2018 06:00 )             34.7     Na(126)/K(4.1)/Cl(79)/HCO3(23)/BUN(45)/Cr(1.34)Glu(117)/Ca(6.4)/Mg(1.8)/PO4(4.8)    08-22 @ 06:00  Na(126)/K(3.7)/Cl(77)/HCO3(27)/BUN(49)/Cr(1.51)Glu(141)/Ca(6.5)/Mg(1.8)/PO4(5.1)    08-21 @ 07:47  Na(125)/K(4.7)/Cl(76)/HCO3(26)/BUN(51)/Cr(1.78)Glu(188)/Ca(6.3)/Mg(1.7)/PO4(5.4)    08-20 @ 20:30        IMPRESSION: 62F w/ HFrEF (10%)-AICD, DM2, HTN, thyroid CA-thyroidectomy, and hypoparathyroidism, 8/20/18 a/w acute on chronic HFrEF/MAGNUS    (1)Renal - MAGNUS - prerenally mediated in association with her decompensated CHF. Starting to improve with IV lasix.    (2)Hypocalcemia - hypoparathyroidism - Ca quite low...but this is a very chronic phenomenon for her, and as a result her risk of complications from the hypocalcemia is quite low. That being said, we should certainly try to get the calcium level above 7, at the very least. Oral calcium and calcitriol increased yesterday.    (3)Hyponatremia - hypervolemic hyponatremia, most likely    (4)CPK - mildly elevated; slowly trending down, without the assistance of IVF      RECOMMEND:    (1)Tums 1000mg PO TID with meals  (2)Calcitriol 0.5mcg bid  (3)Diuretics per primary team; favor usage of thiazides as able and limitation of loop diuretics as able, in effort to drive up and maintain appropriate calcium levels.  (4)CPK daily; no meds for hyperlipidemia; no IVF needed for high CPK  (5)BMP daily  (6)1L Free water restriction  (7)IV calcium gluconate 1gm today and 1gm IV prn Ca <7 in general during the admission    Brian Henry MD  [facility]  [phone] No pain, no shortness of breath      VITAL:  T(C): , Max: 36.8 (08-21-18 @ 10:30)  T(F): , Max: 98.3 (08-21-18 @ 10:30)  HR: 82 (08-22-18 @ 08:49)  BP: 95/68 (08-22-18 @ 08:49)  BP(mean): --  RR: 18 (08-22-18 @ 08:49)  SpO2: 99% (08-22-18 @ 08:49)  UO 1250cc/24h      PHYSICAL EXAM:  Constitutional: NAD, Alert  HEENT: NCAT, MMM  Neck: Supple, No JVD  Respiratory: diminished b/l   Cardiovascular: RRR s1s2, no m/r/g  Gastrointestinal: BS+, soft, NT/ND  Extremities: 3+ b/l LE edema  Neurological: no focal deficits; strength grossly intact  Back: no CVAT b/l  Skin: No rashes, no nevi      LABS:                        11.9   6.84  )-----------( 209      ( 22 Aug 2018 06:00 )             34.7     Na(126)/K(4.1)/Cl(79)/HCO3(23)/BUN(45)/Cr(1.34)Glu(117)/Ca(6.4)/Mg(1.8)/PO4(4.8)    08-22 @ 06:00  Na(126)/K(3.7)/Cl(77)/HCO3(27)/BUN(49)/Cr(1.51)Glu(141)/Ca(6.5)/Mg(1.8)/PO4(5.1)    08-21 @ 07:47  Na(125)/K(4.7)/Cl(76)/HCO3(26)/BUN(51)/Cr(1.78)Glu(188)/Ca(6.3)/Mg(1.7)/PO4(5.4)    08-20 @ 20:30        IMPRESSION: 62F w/ HFrEF (10%)-AICD, DM2, HTN, thyroid CA-thyroidectomy, and hypoparathyroidism, 8/20/18 a/w acute on chronic HFrEF/MAGNUS    (1)Renal - MAGNUS - prerenally mediated in association with her decompensated CHF. Starting to improve with IV lasix.    (2)Hypocalcemia - hypoparathyroidism - Ca quite low...but this is a very chronic phenomenon for her, and as a result her risk of complications from the hypocalcemia is quite low. That being said, we should certainly try to get the calcium level above 7, at the very least. Oral calcium and calcitriol increased yesterday.    (3)Hyponatremia - hypervolemic hyponatremia, most likely    (4)CPK - mildly elevated; slowly trending down, without the assistance of IVF      RECOMMEND:    (1)Tums 1000mg PO TID with meals  (2)Calcitriol 0.5mcg bid  (3)Diuretics per primary team; favor usage of thiazides as able and limitation of loop diuretics as able, in effort to drive up and maintain appropriate calcium levels.  (4)CPK daily; no meds for hyperlipidemia; no IVF needed for high CPK  (5)BMP daily  (6)1L Free water restriction  (7)IV calcium gluconate 1gm today and 1gm IV prn Ca <7 in general during the admission  (8)Check urine: lytes, osm  (9)Check serum: osm, TSH, uric acid      Brian Henry MD  [facility]  [phone] No SOB. Complains about her yellow eyes.      VITAL:  T(C): , Max: 36.8 (08-21-18 @ 10:30)  T(F): , Max: 98.3 (08-21-18 @ 10:30)  HR: 82 (08-22-18 @ 08:49)  BP: 95/68 (08-22-18 @ 08:49)  BP(mean): --  RR: 18 (08-22-18 @ 08:49)  SpO2: 99% (08-22-18 @ 08:49)  UO 1250cc/24h      PHYSICAL EXAM:  Constitutional: NAD, Alert  HEENT: NCAT, MMM; (+)scleral icterus  Neck: Supple, (+) JVD  Respiratory: CTA-R; decreased at left base   Cardiovascular: RRR s1s2, no m/r/g  Gastrointestinal: BS+, soft, NT/ND  Extremities: 2-3+ b/l LE edema  Neurological: no focal deficits; strength grossly intact  Back: no CVAT b/l  Skin: No rashes, no nevi      LABS:                        11.9   6.84  )-----------( 209      ( 22 Aug 2018 06:00 )             34.7     Na(126)/K(4.1)/Cl(79)/HCO3(23)/BUN(45)/Cr(1.34)Glu(117)/Ca(6.4)/Mg(1.8)/PO4(4.8)    08-22 @ 06:00  Na(126)/K(3.7)/Cl(77)/HCO3(27)/BUN(49)/Cr(1.51)Glu(141)/Ca(6.5)/Mg(1.8)/PO4(5.1)    08-21 @ 07:47  Na(125)/K(4.7)/Cl(76)/HCO3(26)/BUN(51)/Cr(1.78)Glu(188)/Ca(6.3)/Mg(1.7)/PO4(5.4)    08-20 @ 20:30        IMPRESSION: 62F w/ HFrEF (10%)-AICD, DM2, HTN, thyroid CA-thyroidectomy, and hypoparathyroidism, 8/20/18 a/w acute on chronic HFrEF/MAGNUS    (1)Renal - MAGNUS - prerenally mediated in association with her decompensated CHF. Starting to improve with IV lasix.    (2)Hypocalcemia - hypoparathyroidism - Ca quite low...but this is a very chronic phenomenon for her, and as a result her risk of complications from the hypocalcemia is quite low. That being said, we should certainly try to get the calcium level above 7, at the very least. Oral calcium and calcitriol increased yesterday.    (3)Hyponatremia - hypervolemic hyponatremia, most likely    (4)CPK - mildly elevated; slowly trending down, without the assistance of IVF      RECOMMEND:  (1)Tums and Calcitriol as ordered  (2)Regardless of what other diuretics we use, I would give HCTZ 50mg po bid to maximize urinary calcium reabsorption  (3)CPK daily; no meds for hyperlipidemia; no IVF needed for high CPK  (4)BMP daily  (5)1L Free water restriction  (6)IV calcium gluconate 1gm today and 1gm IV prn Ca <7 in general during the admission  (7)Check urine: lytes, osm  (8)Check serum: osm, TSH, uric acid  (9)GI f/u    Brian Henry MD  [facility]  [phone]

## 2018-08-23 DIAGNOSIS — K21.9 GASTRO-ESOPHAGEAL REFLUX DISEASE WITHOUT ESOPHAGITIS: ICD-10-CM

## 2018-08-23 LAB
ALBUMIN SERPL ELPH-MCNC: 3.7 G/DL — SIGNIFICANT CHANGE UP (ref 3.3–5)
ALP SERPL-CCNC: 461 U/L — HIGH (ref 40–120)
ALT FLD-CCNC: 35 U/L — HIGH (ref 4–33)
ANA TITR SER: NEGATIVE — SIGNIFICANT CHANGE UP
AST SERPL-CCNC: 67 U/L — HIGH (ref 4–32)
BASOPHILS # BLD AUTO: 0.07 K/UL — SIGNIFICANT CHANGE UP (ref 0–0.2)
BASOPHILS NFR BLD AUTO: 1 % — SIGNIFICANT CHANGE UP (ref 0–2)
BILIRUB DIRECT SERPL-MCNC: 9.2 MG/DL — HIGH (ref 0.1–0.2)
BILIRUB SERPL-MCNC: 12.7 MG/DL — HIGH (ref 0.2–1.2)
BUN SERPL-MCNC: 40 MG/DL — HIGH (ref 7–23)
CALCIUM SERPL-MCNC: 7.1 MG/DL — LOW (ref 8.4–10.5)
CHLORIDE SERPL-SCNC: 79 MMOL/L — LOW (ref 98–107)
CK SERPL-CCNC: 773 U/L — HIGH (ref 25–170)
CO2 SERPL-SCNC: 31 MMOL/L — SIGNIFICANT CHANGE UP (ref 22–31)
CREAT SERPL-MCNC: 1.27 MG/DL — SIGNIFICANT CHANGE UP (ref 0.5–1.3)
EOSINOPHIL # BLD AUTO: 0.07 K/UL — SIGNIFICANT CHANGE UP (ref 0–0.5)
EOSINOPHIL NFR BLD AUTO: 1 % — SIGNIFICANT CHANGE UP (ref 0–6)
GLUCOSE SERPL-MCNC: 137 MG/DL — HIGH (ref 70–99)
HAV IGM SER-ACNC: NONREACTIVE — SIGNIFICANT CHANGE UP
HBV CORE IGM SER-ACNC: NONREACTIVE — SIGNIFICANT CHANGE UP
HBV SURFACE AG SER-ACNC: NONREACTIVE — SIGNIFICANT CHANGE UP
HCT VFR BLD CALC: 36 % — SIGNIFICANT CHANGE UP (ref 34.5–45)
HCV AB S/CO SERPL IA: 0.18 S/CO — SIGNIFICANT CHANGE UP
HCV AB SERPL-IMP: SIGNIFICANT CHANGE UP
HGB BLD-MCNC: 12.4 G/DL — SIGNIFICANT CHANGE UP (ref 11.5–15.5)
IMM GRANULOCYTES # BLD AUTO: 0.06 # — SIGNIFICANT CHANGE UP
IMM GRANULOCYTES NFR BLD AUTO: 0.9 % — SIGNIFICANT CHANGE UP (ref 0–1.5)
LYMPHOCYTES # BLD AUTO: 1.23 K/UL — SIGNIFICANT CHANGE UP (ref 1–3.3)
LYMPHOCYTES # BLD AUTO: 17.7 % — SIGNIFICANT CHANGE UP (ref 13–44)
MAGNESIUM SERPL-MCNC: 1.9 MG/DL — SIGNIFICANT CHANGE UP (ref 1.6–2.6)
MCHC RBC-ENTMCNC: 23.6 PG — LOW (ref 27–34)
MCHC RBC-ENTMCNC: 34.4 % — SIGNIFICANT CHANGE UP (ref 32–36)
MCV RBC AUTO: 68.6 FL — LOW (ref 80–100)
MONOCYTES # BLD AUTO: 0.89 K/UL — SIGNIFICANT CHANGE UP (ref 0–0.9)
MONOCYTES NFR BLD AUTO: 12.8 % — SIGNIFICANT CHANGE UP (ref 2–14)
NEUTROPHILS # BLD AUTO: 4.63 K/UL — SIGNIFICANT CHANGE UP (ref 1.8–7.4)
NEUTROPHILS NFR BLD AUTO: 66.6 % — SIGNIFICANT CHANGE UP (ref 43–77)
NRBC # FLD: 0.02 — SIGNIFICANT CHANGE UP
NT-PROBNP SERPL-SCNC: 2108 PG/ML — SIGNIFICANT CHANGE UP
OSMOLALITY SERPL: 277 MOSMO/KG — SIGNIFICANT CHANGE UP (ref 275–295)
PLATELET # BLD AUTO: 217 K/UL — SIGNIFICANT CHANGE UP (ref 150–400)
PMV BLD: SIGNIFICANT CHANGE UP FL (ref 7–13)
POTASSIUM SERPL-MCNC: 2.9 MMOL/L — CRITICAL LOW (ref 3.5–5.3)
POTASSIUM SERPL-SCNC: 2.9 MMOL/L — CRITICAL LOW (ref 3.5–5.3)
PROT SERPL-MCNC: 7.7 G/DL — SIGNIFICANT CHANGE UP (ref 6–8.3)
RBC # BLD: 5.25 M/UL — HIGH (ref 3.8–5.2)
RBC # FLD: 22 % — HIGH (ref 10.3–14.5)
SODIUM SERPL-SCNC: 130 MMOL/L — LOW (ref 135–145)
TSH SERPL-MCNC: 1.51 UIU/ML — SIGNIFICANT CHANGE UP (ref 0.27–4.2)
URATE SERPL-MCNC: 15.4 MG/DL — HIGH (ref 2.5–7)
WBC # BLD: 6.95 K/UL — SIGNIFICANT CHANGE UP (ref 3.8–10.5)
WBC # FLD AUTO: 6.95 K/UL — SIGNIFICANT CHANGE UP (ref 3.8–10.5)

## 2018-08-23 PROCEDURE — 93306 TTE W/DOPPLER COMPLETE: CPT | Mod: 26

## 2018-08-23 RX ORDER — PANTOPRAZOLE SODIUM 20 MG/1
40 TABLET, DELAYED RELEASE ORAL
Qty: 0 | Refills: 0 | Status: DISCONTINUED | OUTPATIENT
Start: 2018-08-23 | End: 2018-08-28

## 2018-08-23 RX ORDER — POTASSIUM CHLORIDE 20 MEQ
40 PACKET (EA) ORAL EVERY 4 HOURS
Qty: 0 | Refills: 0 | Status: COMPLETED | OUTPATIENT
Start: 2018-08-23 | End: 2018-08-23

## 2018-08-23 RX ADMIN — Medication 50 MILLIGRAM(S): at 20:16

## 2018-08-23 RX ADMIN — Medication 2 TABLET(S): at 05:26

## 2018-08-23 RX ADMIN — MAGNESIUM OXIDE 400 MG ORAL TABLET 400 MILLIGRAM(S): 241.3 TABLET ORAL at 09:28

## 2018-08-23 RX ADMIN — CALCITRIOL 0.5 MICROGRAM(S): 0.5 CAPSULE ORAL at 18:22

## 2018-08-23 RX ADMIN — MAGNESIUM OXIDE 400 MG ORAL TABLET 400 MILLIGRAM(S): 241.3 TABLET ORAL at 18:22

## 2018-08-23 RX ADMIN — Medication 80 MILLIGRAM(S): at 09:28

## 2018-08-23 RX ADMIN — Medication 40 MILLIEQUIVALENT(S): at 09:28

## 2018-08-23 RX ADMIN — Medication 2: at 18:22

## 2018-08-23 RX ADMIN — Medication 1000 UNIT(S): at 13:16

## 2018-08-23 RX ADMIN — Medication 40 MILLIEQUIVALENT(S): at 18:22

## 2018-08-23 RX ADMIN — Medication 2 TABLET(S): at 13:16

## 2018-08-23 RX ADMIN — Medication 40 MILLIEQUIVALENT(S): at 13:16

## 2018-08-23 RX ADMIN — Medication 25 MILLIGRAM(S): at 13:16

## 2018-08-23 RX ADMIN — Medication 81 MILLIGRAM(S): at 13:16

## 2018-08-23 RX ADMIN — CALCITRIOL 0.5 MICROGRAM(S): 0.5 CAPSULE ORAL at 05:25

## 2018-08-23 RX ADMIN — Medication 175 MICROGRAM(S): at 05:25

## 2018-08-23 RX ADMIN — HEPARIN SODIUM 5000 UNIT(S): 5000 INJECTION INTRAVENOUS; SUBCUTANEOUS at 18:23

## 2018-08-23 RX ADMIN — MAGNESIUM OXIDE 400 MG ORAL TABLET 400 MILLIGRAM(S): 241.3 TABLET ORAL at 13:16

## 2018-08-23 RX ADMIN — Medication 100 MILLIGRAM(S): at 09:36

## 2018-08-23 RX ADMIN — Medication 2 TABLET(S): at 22:46

## 2018-08-23 NOTE — PROGRESS NOTE ADULT - PROBLEM SELECTOR PLAN 2
- daily weights  - monitor I & O's   - fluid restriction   - Low sodium diet  - continue diureses per cardiology - gerd precautions   - protonix 40mg qd started  - maalox prn

## 2018-08-23 NOTE — PROGRESS NOTE ADULT - SUBJECTIVE AND OBJECTIVE BOX
EP ATTENDING    less dyspnea, no palpitations, no syncope, + jaundice      MEDICATIONS  (STANDING):  aspirin enteric coated 81 milliGRAM(s) Oral daily  calcitriol   Capsule 0.5 MICROGram(s) Oral two times a day  calcium carbonate    500 mG (Tums) Chewable 2 Tablet(s) Chew three times a day  cholecalciferol 1000 Unit(s) Oral daily  furosemide   Injectable 80 milliGRAM(s) IV Push two times a day  heparin  Injectable 5000 Unit(s) SubCutaneous every 12 hours  hydrochlorothiazide 50 milliGRAM(s) Oral two times a day  insulin lispro (HumaLOG) corrective regimen sliding scale   SubCutaneous three times a day before meals  insulin lispro (HumaLOG) corrective regimen sliding scale   SubCutaneous at bedtime  levothyroxine 175 MICROGram(s) Oral daily  magnesium oxide 400 milliGRAM(s) Oral three times a day with meals  metoprolol succinate ER 25 milliGRAM(s) Oral daily  pantoprazole    Tablet 40 milliGRAM(s) Oral before breakfast  potassium chloride    Tablet ER 40 milliEquivalent(s) Oral every 4 hours    MEDICATIONS  (PRN):  dextrose 40% Gel 15 Gram(s) Oral once PRN Blood Glucose LESS THAN 70 milliGRAM(s)/deciliter  glucagon  Injectable 1 milliGRAM(s) IntraMuscular once PRN Glucose LESS THAN 70 milligrams/deciliter  guaiFENesin   Syrup  (Sugar-Free) 100 milliGRAM(s) Oral every 6 hours PRN Cough      LABS:                        12.4   6.95  )-----------( 217      ( 23 Aug 2018 05:45 )             36.0     130<L>  |  79<L>  |  40<H>  ----------------------------<  137<H>  2.9<LL>   |  31  |  1.27    Ca    7.1<L>      23 Aug 2018 05:45  Phos  4.8     08-22  Mg     1.9     08-23    TPro  7.7  /  Alb  3.7  /  TBili  12.7<H>  /  DBili  9.2<H>  /  AST  67<H>  /  ALT  35<H>  /  AlkPhos  461<H>  08-23    Creatinine Trend: 1.27<--, 1.34<--, 1.51<--, 1.78<--     CARDIAC MARKERS ( 23 Aug 2018 05:45 )  x     / x     / 773 u/L / x     / x      CARDIAC MARKERS ( 22 Aug 2018 06:00 )  x     / x     / 898 u/L / x     / x      CARDIAC MARKERS ( 21 Aug 2018 07:47 )  x     / x     / 1013 u/L / 4.61 ng/mL / x      CARDIAC MARKERS ( 20 Aug 2018 23:00 )  x     / x     / 1022 u/L / 4.71 ng/mL / x      CARDIAC MARKERS ( 20 Aug 2018 20:30 )  x     / x     / 1026 u/L / 4.54 ng/mL / x        PHYSICAL EXAM  Vital Signs Last 24 Hrs  T(C): 36.3 (23 Aug 2018 12:22), Max: 36.4 (22 Aug 2018 21:02)  T(F): 97.3 (23 Aug 2018 12:22), Max: 97.6 (23 Aug 2018 05:19)  HR: 78 (23 Aug 2018 12:22) (76 - 86)  BP: 110/74 (23 Aug 2018 12:22) (93/62 - 120/85)  RR: 18 (23 Aug 2018 12:22) (17 - 18)  SpO2: 100% (23 Aug 2018 12:22) (98% - 100%)    JVP 20  RRR, no murmurs  decreased BS  soft nt/nd  + 2 edema    ASSESSMENT/PLAN: She is a pleasant 63 y/o female PMH severe NICM (LVEF 10-15%) who had a [employer]onic Biv-ICD implanted at [place], hx DM, HTN, thyroid ca s/p thyroidectomy, recent LHC was also unremarkable.  She was hospitalized last month for decompensated CHF requiring ionotrope assisted diuresis.  ICD interrogation at that time with no events.  She comes to ED with c/o 10 lb weight gain in 1 week, LE edema, orthopnea, c/w acute on chronic systolic HF exacerbation, also with hypocalcemia, MAGNUS, elevated LFTs and elevated CPK.      -IV diuresis  -GI w/u in progress  -BV-ICD has been optimized. Management of CHF as per cardiology.

## 2018-08-23 NOTE — PROGRESS NOTE ADULT - SUBJECTIVE AND OBJECTIVE BOX
No pain, no shortness of breath      VITAL:  T(C): , Max: 36.4 (08-22-18 @ 21:02)  T(F): , Max: 97.6 (08-23-18 @ 05:19)  HR: 79 (08-23-18 @ 05:19)  BP: 120/85 (08-23-18 @ 05:19)  BP(mean): --  RR: 18 (08-23-18 @ 05:19)  SpO2: 100% (08-23-18 @ 05:19)      PHYSICAL EXAM:  Constitutional: NAD, Alert  HEENT: NCAT, MMM; (+)scleral icterus  Neck: Supple, (+) JVD  Respiratory: CTA-R; decreased at left base   Cardiovascular: RRR s1s2, no m/r/g  Gastrointestinal: BS+, soft, NT/ND  Extremities: 2-3+ b/l LE edema  Neurological: no focal deficits; strength grossly intact  Back: no CVAT b/l  Skin: No rashes, no nevi      LABS:                        12.4   6.95  )-----------( 217      ( 23 Aug 2018 05:45 )             36.0     Na(130)/K(2.9)/Cl(79)/HCO3(31)/BUN(40)/Cr(1.27)Glu(137)/Ca(7.1)/Mg(1.9)/PO4(--)    08-23 @ 05:45  Na(126)/K(4.1)/Cl(79)/HCO3(23)/BUN(45)/Cr(1.34)Glu(117)/Ca(6.4)/Mg(1.8)/PO4(4.8)    08-22 @ 06:00  Na(126)/K(3.7)/Cl(77)/HCO3(27)/BUN(49)/Cr(1.51)Glu(141)/Ca(6.5)/Mg(1.8)/PO4(5.1)    08-21 @ 07:47  Na(125)/K(4.7)/Cl(76)/HCO3(26)/BUN(51)/Cr(1.78)Glu(188)/Ca(6.3)/Mg(1.7)/PO4(5.4)    08-20 @ 20:30  Sodium, Random Urine: < 20 mmol/L (08-22 @ 12:30)  Potassium, Random Urine: 32.7 mmol/L (08-22 @ 12:30)  Chloride, Random Urine: < 20 mmol/L (08-22 @ 12:30)  Osmolality, Random Urine: 298 mosmo/kg (08-22 @ 12:30)      IMPRESSION: 62F w/ HFrEF (10%)-AICD, DM2, HTN, thyroid CA-thyroidectomy, and hypoparathyroidism, 8/20/18 a/w acute on chronic HFrEF/MAGNUS    (1)Renal - MAGNUS - prerenally mediated in association with her decompensated CHF. Starting to improve with IV lasix.    (2)Hypocalcemia - hypoparathyroidism - Ca low but trending up, on combination of Tums, Calcitriol, and HCTZ    (3)Hyponatremia - hypervolemic hyponatremia - improving with diuresis      RECOMMEND:  (1)Tums and Calcitriol, and HCTZ as ordered  (2)No objection to continuing IV lasix      Brian Henry MD  [facility]  [phone] No pain, no shortness of breath      VITAL:  T(C): , Max: 36.4 (08-22-18 @ 21:02)  T(F): , Max: 97.6 (08-23-18 @ 05:19)  HR: 79 (08-23-18 @ 05:19)  BP: 120/85 (08-23-18 @ 05:19)  BP(mean): --  RR: 18 (08-23-18 @ 05:19)  SpO2: 100% (08-23-18 @ 05:19)      PHYSICAL EXAM:  Constitutional: NAD, Alert  HEENT: NCAT, MMM; (+)scleral icterus  Neck: Supple, (+) JVD  Respiratory: CTA-R; decreased at left base   Cardiovascular: RRR s1s2, no m/r/g  Gastrointestinal: BS+, soft, NT/ND  Extremities: 2-3+ b/l LE edema  Neurological: no focal deficits; strength grossly intact  Back: no CVAT b/l  Skin: No rashes, no nevi      LABS:                        12.4   6.95  )-----------( 217      ( 23 Aug 2018 05:45 )             36.0     Na(130)/K(2.9)/Cl(79)/HCO3(31)/BUN(40)/Cr(1.27)Glu(137)/Ca(7.1)/Mg(1.9)/PO4(--)    08-23 @ 05:45  Na(126)/K(4.1)/Cl(79)/HCO3(23)/BUN(45)/Cr(1.34)Glu(117)/Ca(6.4)/Mg(1.8)/PO4(4.8)    08-22 @ 06:00  Na(126)/K(3.7)/Cl(77)/HCO3(27)/BUN(49)/Cr(1.51)Glu(141)/Ca(6.5)/Mg(1.8)/PO4(5.1)    08-21 @ 07:47  Na(125)/K(4.7)/Cl(76)/HCO3(26)/BUN(51)/Cr(1.78)Glu(188)/Ca(6.3)/Mg(1.7)/PO4(5.4)    08-20 @ 20:30  Sodium, Random Urine: < 20 mmol/L (08-22 @ 12:30)  Potassium, Random Urine: 32.7 mmol/L (08-22 @ 12:30)  Chloride, Random Urine: < 20 mmol/L (08-22 @ 12:30)  Osmolality, Random Urine: 298 mosmo/kg (08-22 @ 12:30)      IMPRESSION: 62F w/ HFrEF (10%)-AICD, DM2, HTN, thyroid CA-thyroidectomy, and hypoparathyroidism, 8/20/18 a/w acute on chronic HFrEF/MAGNUS    (1)Renal - MAGNUS - prerenally mediated in association with her decompensated CHF. Starting to improve with IV lasix.    (2)Hypocalcemia - hypoparathyroidism - Ca low but trending up, on combination of Tums, Calcitriol, and HCTZ    (3)Hyponatremia - hypervolemic hyponatremia - improving with diuresis    (4)Hypokalemia - diuretic-associated    (5)Liver - to require biopsy? GI on board. Serologies pending.    RECOMMEND:  (1)Tums and Calcitriol, and HCTZ as ordered  (2)No objection to continuing IV lasix  (3)K+ repletion - 20meq po x 3  (4)GI f/u      Brian Henry MD  [facility]  [phone] No pain, no shortness of breath      VITAL:  T(C): , Max: 36.4 (08-22-18 @ 21:02)  T(F): , Max: 97.6 (08-23-18 @ 05:19)  HR: 79 (08-23-18 @ 05:19)  BP: 120/85 (08-23-18 @ 05:19)  BP(mean): --  RR: 18 (08-23-18 @ 05:19)  SpO2: 100% (08-23-18 @ 05:19)      PHYSICAL EXAM:  Constitutional: NAD, Alert  HEENT: NCAT, MMM; (+)scleral icterus  Neck: Supple, (+) JVD  Respiratory: CTA-R; decreased at left base   Cardiovascular: RRR s1s2, no m/r/g  Gastrointestinal: BS+, soft, NT/ND  Extremities: 2-3+ b/l LE edema  Neurological: no focal deficits; strength grossly intact  Back: no CVAT b/l  Skin: No rashes, no nevi      LABS:                        12.4   6.95  )-----------( 217      ( 23 Aug 2018 05:45 )             36.0     Na(130)/K(2.9)/Cl(79)/HCO3(31)/BUN(40)/Cr(1.27)Glu(137)/Ca(7.1)/Mg(1.9)/PO4(--)    08-23 @ 05:45  Na(126)/K(4.1)/Cl(79)/HCO3(23)/BUN(45)/Cr(1.34)Glu(117)/Ca(6.4)/Mg(1.8)/PO4(4.8)    08-22 @ 06:00  Na(126)/K(3.7)/Cl(77)/HCO3(27)/BUN(49)/Cr(1.51)Glu(141)/Ca(6.5)/Mg(1.8)/PO4(5.1)    08-21 @ 07:47  Na(125)/K(4.7)/Cl(76)/HCO3(26)/BUN(51)/Cr(1.78)Glu(188)/Ca(6.3)/Mg(1.7)/PO4(5.4)    08-20 @ 20:30  Sodium, Random Urine: < 20 mmol/L (08-22 @ 12:30)  Potassium, Random Urine: 32.7 mmol/L (08-22 @ 12:30)  Chloride, Random Urine: < 20 mmol/L (08-22 @ 12:30)  Osmolality, Random Urine: 298 mosmo/kg (08-22 @ 12:30)      IMPRESSION: 62F w/ HFrEF (10%)-AICD, DM2, HTN, thyroid CA-thyroidectomy, and hypoparathyroidism, 8/20/18 a/w acute on chronic HFrEF/MAGNUS    (1)Renal - MAGNUS - prerenally mediated in association with her decompensated CHF. Starting to improve with IV lasix.    (2)Hypocalcemia - hypoparathyroidism - Ca low but trending up, on combination of Tums, Calcitriol, and HCTZ    (3)Hyponatremia - hypervolemic hyponatremia - improving with diuresis    (4)Hypokalemia - diuretic-associated    (5)Liver - to require biopsy? GI on board. Serologies pending.    RECOMMEND:  (1)Tums and Calcitriol, and HCTZ as ordered  (2)No objection to continuing IV lasix  (3)K+ repletion - 40meq po x 3  (4)GI f/u      Brian Henry MD  [facility]  [phone] No pain, no shortness of breath      VITAL:  T(C): , Max: 36.4 (08-22-18 @ 21:02)  T(F): , Max: 97.6 (08-23-18 @ 05:19)  HR: 79 (08-23-18 @ 05:19)  BP: 120/85 (08-23-18 @ 05:19)  BP(mean): --  RR: 18 (08-23-18 @ 05:19)  SpO2: 100% (08-23-18 @ 05:19)      PHYSICAL EXAM:  Constitutional: NAD, Alert  HEENT: NCAT, MMM; (+)scleral icterus  Neck: Supple, (+) JVD  Respiratory: CTA-b/l  Cardiovascular: RRR s1s2, no m/r/g  Gastrointestinal: BS+, soft, NT/ND  Extremities: 2+ b/l LE edema  Neurological: no focal deficits; strength grossly intact  Back: no CVAT b/l  Skin: No rashes, no nevi      LABS:                        12.4   6.95  )-----------( 217      ( 23 Aug 2018 05:45 )             36.0     Na(130)/K(2.9)/Cl(79)/HCO3(31)/BUN(40)/Cr(1.27)Glu(137)/Ca(7.1)/Mg(1.9)/PO4(--)    08-23 @ 05:45  Na(126)/K(4.1)/Cl(79)/HCO3(23)/BUN(45)/Cr(1.34)Glu(117)/Ca(6.4)/Mg(1.8)/PO4(4.8)    08-22 @ 06:00  Na(126)/K(3.7)/Cl(77)/HCO3(27)/BUN(49)/Cr(1.51)Glu(141)/Ca(6.5)/Mg(1.8)/PO4(5.1)    08-21 @ 07:47  Na(125)/K(4.7)/Cl(76)/HCO3(26)/BUN(51)/Cr(1.78)Glu(188)/Ca(6.3)/Mg(1.7)/PO4(5.4)    08-20 @ 20:30  Sodium, Random Urine: < 20 mmol/L (08-22 @ 12:30)  Potassium, Random Urine: 32.7 mmol/L (08-22 @ 12:30)  Chloride, Random Urine: < 20 mmol/L (08-22 @ 12:30)  Osmolality, Random Urine: 298 mosmo/kg (08-22 @ 12:30)      IMPRESSION: 62F w/ HFrEF (10%)-AICD, DM2, HTN, thyroid CA-thyroidectomy, and hypoparathyroidism, 8/20/18 a/w acute on chronic HFrEF/MAGNUS    (1)Renal - MAGNUS - prerenally mediated in association with her decompensated CHF. Starting to improve with IV lasix.    (2)Hypocalcemia - hypoparathyroidism - Ca low but trending up, on combination of Tums, Calcitriol, and HCTZ    (3)Hyponatremia - hypervolemic hyponatremia - improving with diuresis    (4)Hypokalemia - diuretic-associated    (5)Liver - to require biopsy? GI on board. Serologies pending.    RECOMMEND:  (1)Tums and Calcitriol, and HCTZ as ordered  (2)No objection to continuing IV lasix  (3)K+ repletion - 40meq po x 3  (4)GI f/u      Brian Henry MD  [facility]  [phone] No pain, no shortness of breath      VITAL:  T(C): , Max: 36.4 (08-22-18 @ 21:02)  T(F): , Max: 97.6 (08-23-18 @ 05:19)  HR: 79 (08-23-18 @ 05:19)  BP: 120/85 (08-23-18 @ 05:19)  BP(mean): --  RR: 18 (08-23-18 @ 05:19)  SpO2: 100% (08-23-18 @ 05:19)      PHYSICAL EXAM:  Constitutional: NAD, Alert  HEENT: NCAT, MMM; (+)scleral icterus  Neck: Supple, (+) JVD  Respiratory: CTA-b/l  Cardiovascular: RRR s1s2, no m/r/g  Gastrointestinal: BS+, soft, NT/ND  Extremities: 2+ b/l LE edema  Neurological: no focal deficits; strength grossly intact  Back: no CVAT b/l  Skin: No rashes, no nevi      LABS:                        12.4   6.95  )-----------( 217      ( 23 Aug 2018 05:45 )             36.0     Na(130)/K(2.9)/Cl(79)/HCO3(31)/BUN(40)/Cr(1.27)Glu(137)/Ca(7.1)/Mg(1.9)/PO4(--)    08-23 @ 05:45  Na(126)/K(4.1)/Cl(79)/HCO3(23)/BUN(45)/Cr(1.34)Glu(117)/Ca(6.4)/Mg(1.8)/PO4(4.8)    08-22 @ 06:00  Na(126)/K(3.7)/Cl(77)/HCO3(27)/BUN(49)/Cr(1.51)Glu(141)/Ca(6.5)/Mg(1.8)/PO4(5.1)    08-21 @ 07:47  Na(125)/K(4.7)/Cl(76)/HCO3(26)/BUN(51)/Cr(1.78)Glu(188)/Ca(6.3)/Mg(1.7)/PO4(5.4)    08-20 @ 20:30  Sodium, Random Urine: < 20 mmol/L (08-22 @ 12:30)  Potassium, Random Urine: 32.7 mmol/L (08-22 @ 12:30)  Chloride, Random Urine: < 20 mmol/L (08-22 @ 12:30)  Osmolality, Random Urine: 298 mosmo/kg (08-22 @ 12:30)      IMPRESSION: 62F w/ HFrEF (10%)-AICD, DM2, HTN, thyroid CA-thyroidectomy, and hypoparathyroidism, 8/20/18 a/w acute on chronic HFrEF/MAGNUS    (1)Renal - MAGNUS - prerenally mediated in association with her decompensated CHF. Substantially improved over past few days, with diuresis    (2)Hypocalcemia - hypoparathyroidism - Ca low but trending up, on combination of Tums, Calcitriol, and HCTZ    (3)Hyponatremia - hypervolemic hyponatremia - improving with diuresis    (4)Hypokalemia - diuretic-associated    (5)Liver - to require biopsy? GI on board. Serologies pending.    RECOMMEND:  (1)Tums and Calcitriol, and HCTZ as ordered  (2)No objection to continuing IV lasix  (3)K+ repletion - 40meq po x 3  (4)GI f/u      Brian Henry MD  [facility]  [phone]

## 2018-08-23 NOTE — DIETITIAN INITIAL EVALUATION ADULT. - DIET TYPE
consistent carbohydrate (no snacks)/1000ml/DASH/TLC (sodium and cholesterol restricted diet)/regular/renal replacement pts:no protein restr,no conc K & phos, low sodium

## 2018-08-23 NOTE — DIETITIAN INITIAL EVALUATION ADULT. - OTHER INFO
61 y/o F with PMH of CHF(with EF of 10% s/p AICD) with recent CCU stay for inotrope assisted diuresis, DM, HTN, Thyroid cancer s/p thyroidectomy presented with the complaint of weight gain and LE swelling. R/o CHF exacerbation. Reports good appetite and po intake and denies nausea/vomiting/diarrhea/constipation or any issues with chewing/swallowing. Pt was educated on low na, fluid restricted diet. Stated height 67 inches.

## 2018-08-23 NOTE — PROGRESS NOTE ADULT - PROBLEM SELECTOR PROBLEM 2
Acute on chronic systolic (congestive) heart failure Gastroesophageal reflux disease without esophagitis

## 2018-08-23 NOTE — PROGRESS NOTE ADULT - SUBJECTIVE AND OBJECTIVE BOX
INTERVAL HPI/OVERNIGHT EVENTS:    +icterus   no abdominal complaints this morning   no n/v  tolerating po intake  c/o cough    MEDICATIONS  (STANDING):  aspirin enteric coated 81 milliGRAM(s) Oral daily  calcitriol   Capsule 0.5 MICROGram(s) Oral two times a day  calcium carbonate    500 mG (Tums) Chewable 2 Tablet(s) Chew three times a day  cholecalciferol 1000 Unit(s) Oral daily  dextrose 5%. 1000 milliLiter(s) (50 mL/Hr) IV Continuous <Continuous>  dextrose 50% Injectable 12.5 Gram(s) IV Push once  dextrose 50% Injectable 25 Gram(s) IV Push once  dextrose 50% Injectable 25 Gram(s) IV Push once  furosemide   Injectable 80 milliGRAM(s) IV Push two times a day  heparin  Injectable 5000 Unit(s) SubCutaneous every 12 hours  hydrochlorothiazide 50 milliGRAM(s) Oral two times a day  insulin lispro (HumaLOG) corrective regimen sliding scale   SubCutaneous three times a day before meals  insulin lispro (HumaLOG) corrective regimen sliding scale   SubCutaneous at bedtime  levothyroxine 175 MICROGram(s) Oral daily  magnesium oxide 400 milliGRAM(s) Oral three times a day with meals  metoprolol succinate ER 25 milliGRAM(s) Oral daily  potassium chloride    Tablet ER 40 milliEquivalent(s) Oral every 4 hours    MEDICATIONS  (PRN):  dextrose 40% Gel 15 Gram(s) Oral once PRN Blood Glucose LESS THAN 70 milliGRAM(s)/deciliter  glucagon  Injectable 1 milliGRAM(s) IntraMuscular once PRN Glucose LESS THAN 70 milligrams/deciliter  guaiFENesin   Syrup  (Sugar-Free) 100 milliGRAM(s) Oral every 6 hours PRN Cough      Allergies    Isordil (Headache)  penicillin (Rash)    Intolerances        Review of Systems:    General:  No wt loss, fevers, chills, night sweats, fatigue   Eyes:  Good vision, no reported pain  ENT:  No sore throat, pain, runny nose, dysphagia; +cough  CV:  No pain, palpitations, hypo/hypertension  Resp:  No dyspnea, cough, tachypnea, wheezing  GI:  No pain, No nausea, No vomiting, No diarrhea, No constipation, No weight loss, No fever, No pruritis, No rectal bleeding, No melena, No dysphagia  :  No pain, bleeding, incontinence, nocturia  Muscle:  No pain, weakness  Neuro:  No weakness, tingling, memory problems  Psych:  No fatigue, insomnia, mood problems, depression  Endocrine:  No polyuria, polydypsia, cold/heat intolerance  Heme:  No petechiae, ecchymosis, easy bruisability  Skin:  No rash, tattoos, scars, edema      Vital Signs Last 24 Hrs  T(C): 36.4 (23 Aug 2018 09:24), Max: 36.4 (22 Aug 2018 21:02)  T(F): 97.5 (23 Aug 2018 09:24), Max: 97.6 (23 Aug 2018 05:19)  HR: 76 (23 Aug 2018 09:24) (76 - 86)  BP: 100/71 (23 Aug 2018 09:24) (93/62 - 120/85)  BP(mean): --  RR: 18 (23 Aug 2018 09:24) (17 - 18)  SpO2: 98% (23 Aug 2018 09:24) (98% - 100%)    PHYSICAL EXAM:    Constitutional: NAD  HEENT: EOMI, throat clear; +icterus  Neck: No LAD, supple  Respiratory: CTA and P  Cardiovascular: S1 and S2, RRR, no M  Gastrointestinal: BS+, soft, NT/ND, neg HSM,  Extremities: No peripheral edema, neg clubbing, cyanosis  Vascular: 2+ peripheral pulses  Neurological: A/O x 3, no focal deficits  Psychiatric: Normal mood, normal affect  Skin: No rashes      LABS:                        12.4   6.95  )-----------( 217      ( 23 Aug 2018 05:45 )             36.0     08-23    130<L>  |  79<L>  |  40<H>  ----------------------------<  137<H>  2.9<LL>   |  31  |  1.27    Ca    7.1<L>      23 Aug 2018 05:45  Phos  4.8     08-22  Mg     1.9     08-23    TPro  7.7  /  Alb  3.7  /  TBili  12.7<H>  /  DBili  9.2<H>  /  AST  67<H>  /  ALT  35<H>  /  AlkPhos  461<H>  08-23          RADIOLOGY & ADDITIONAL TESTS:

## 2018-08-23 NOTE — DIETITIAN INITIAL EVALUATION ADULT. - NS AS NUTRI INTERV MEALS SNACK
General/healthful diet/1. Recommend change diet to Low Na, No Concentrated Phosphorus, CSTCHO, 1000 mL fluid restriction

## 2018-08-23 NOTE — PROGRESS NOTE ADULT - SUBJECTIVE AND OBJECTIVE BOX
INTERVAL HPI/OVERNIGHT EVENTS: Feel better .   Vital Signs Last 24 Hrs  T(C): 36.4 (23 Aug 2018 21:36), Max: 36.7 (23 Aug 2018 20:12)  T(F): 97.5 (23 Aug 2018 21:36), Max: 98 (23 Aug 2018 20:12)  HR: 88 (23 Aug 2018 21:36) (76 - 88)  BP: 100/74 (23 Aug 2018 21:36) (100/71 - 120/85)  BP(mean): --  RR: 18 (23 Aug 2018 21:36) (17 - 18)  SpO2: 100% (23 Aug 2018 21:36) (98% - 100%)  I&O's Summary    22 Aug 2018 07:01  -  23 Aug 2018 07:00  --------------------------------------------------------  IN: 320 mL / OUT: 2350 mL / NET: -2030 mL    23 Aug 2018 07:01  -  23 Aug 2018 23:43  --------------------------------------------------------  IN: 720 mL / OUT: 1900 mL / NET: -1180 mL      MEDICATIONS  (STANDING):  aspirin enteric coated 81 milliGRAM(s) Oral daily  calcitriol   Capsule 0.5 MICROGram(s) Oral two times a day  calcium carbonate    500 mG (Tums) Chewable 2 Tablet(s) Chew three times a day  cholecalciferol 1000 Unit(s) Oral daily  dextrose 5%. 1000 milliLiter(s) (50 mL/Hr) IV Continuous <Continuous>  dextrose 50% Injectable 12.5 Gram(s) IV Push once  dextrose 50% Injectable 25 Gram(s) IV Push once  dextrose 50% Injectable 25 Gram(s) IV Push once  furosemide   Injectable 80 milliGRAM(s) IV Push two times a day  heparin  Injectable 5000 Unit(s) SubCutaneous every 12 hours  hydrochlorothiazide 50 milliGRAM(s) Oral two times a day  insulin lispro (HumaLOG) corrective regimen sliding scale   SubCutaneous three times a day before meals  insulin lispro (HumaLOG) corrective regimen sliding scale   SubCutaneous at bedtime  levothyroxine 175 MICROGram(s) Oral daily  magnesium oxide 400 milliGRAM(s) Oral three times a day with meals  metoprolol succinate ER 25 milliGRAM(s) Oral daily  pantoprazole    Tablet 40 milliGRAM(s) Oral before breakfast    MEDICATIONS  (PRN):  dextrose 40% Gel 15 Gram(s) Oral once PRN Blood Glucose LESS THAN 70 milliGRAM(s)/deciliter  glucagon  Injectable 1 milliGRAM(s) IntraMuscular once PRN Glucose LESS THAN 70 milligrams/deciliter  guaiFENesin   Syrup  (Sugar-Free) 100 milliGRAM(s) Oral every 6 hours PRN Cough    LABS:                        12.4   6.95  )-----------( 217      ( 23 Aug 2018 05:45 )             36.0     08-23    130<L>  |  79<L>  |  40<H>  ----------------------------<  137<H>  2.9<LL>   |  31  |  1.27    Ca    7.1<L>      23 Aug 2018 05:45  Phos  4.8     08-22  Mg     1.9     08-23    TPro  7.7  /  Alb  3.7  /  TBili  12.7<H>  /  DBili  9.2<H>  /  AST  67<H>  /  ALT  35<H>  /  AlkPhos  461<H>  08-23        CAPILLARY BLOOD GLUCOSE      POCT Blood Glucose.: 142 mg/dL (23 Aug 2018 22:45)  POCT Blood Glucose.: 144 mg/dL (23 Aug 2018 21:36)  POCT Blood Glucose.: 224 mg/dL (23 Aug 2018 17:16)  POCT Blood Glucose.: 133 mg/dL (23 Aug 2018 12:28)  POCT Blood Glucose.: 137 mg/dL (23 Aug 2018 08:20)          Consultant(s) Notes Reviewed:  [x ] YES  [ ] NO    PHYSICAL EXAM:  GENERAL: NAD, well-groomed, well-developed, not in any distress ,  HEAD:  Atraumatic, Normocephalic  EYES: EOMI, PERRLA, conjunctiva and sclera clear  ENMT: No tonsillar erythema, exudates, or enlargement; Moist mucous membranes, Good dentition, No lesions  NECK: Supple, +++JVD, Normal thyroid  NERVOUS SYSTEM:  Alert & Oriented X3, No focal deficit   CHEST/LUNG: Good air entry bilateral with no  rales, rhonchi, wheezing, or rubs  HEART: Regular rate and rhythm; No murmurs, rubs, or gallops  ABDOMEN: Soft, Nontender, Nondistended; Bowel sounds present  EXTREMITIES:  2+ Peripheral Pulses, No clubbing, cyanosis, but ++ edema  SKIN: No rashes or lesions    Care Discussed with Consultants/Other Providers [ x] YES  [ ] NO

## 2018-08-23 NOTE — PROGRESS NOTE ADULT - SUBJECTIVE AND OBJECTIVE BOX
S: no chest pain; sob improving     aspirin enteric coated 81 milliGRAM(s) Oral daily  calcitriol   Capsule 0.5 MICROGram(s) Oral two times a day  calcium carbonate    500 mG (Tums) Chewable 2 Tablet(s) Chew three times a day  cholecalciferol 1000 Unit(s) Oral daily  dextrose 40% Gel 15 Gram(s) Oral once PRN  dextrose 5%. 1000 milliLiter(s) IV Continuous <Continuous>  dextrose 50% Injectable 12.5 Gram(s) IV Push once  dextrose 50% Injectable 25 Gram(s) IV Push once  dextrose 50% Injectable 25 Gram(s) IV Push once  furosemide   Injectable 80 milliGRAM(s) IV Push two times a day  glucagon  Injectable 1 milliGRAM(s) IntraMuscular once PRN  guaiFENesin   Syrup  (Sugar-Free) 100 milliGRAM(s) Oral every 6 hours PRN  heparin  Injectable 5000 Unit(s) SubCutaneous every 12 hours  hydrochlorothiazide 50 milliGRAM(s) Oral two times a day  insulin lispro (HumaLOG) corrective regimen sliding scale   SubCutaneous three times a day before meals  insulin lispro (HumaLOG) corrective regimen sliding scale   SubCutaneous at bedtime  levothyroxine 175 MICROGram(s) Oral daily  magnesium oxide 400 milliGRAM(s) Oral three times a day with meals  metoprolol succinate ER 25 milliGRAM(s) Oral daily  pantoprazole    Tablet 40 milliGRAM(s) Oral before breakfast  potassium chloride    Tablet ER 40 milliEquivalent(s) Oral every 4 hours                            12.4   6.95  )-----------( 217      ( 23 Aug 2018 05:45 )             36.0       08-23    130<L>  |  79<L>  |  40<H>  ----------------------------<  137<H>  2.9<LL>   |  31  |  1.27    Ca    7.1<L>      23 Aug 2018 05:45  Phos  4.8     08-22  Mg     1.9     08-23    TPro  7.7  /  Alb  3.7  /  TBili  12.7<H>  /  DBili  9.2<H>  /  AST  67<H>  /  ALT  35<H>  /  AlkPhos  461<H>  08-23      CARDIAC MARKERS ( 23 Aug 2018 05:45 )  x     / x     / 773 u/L / x     / x      CARDIAC MARKERS ( 22 Aug 2018 06:00 )  x     / x     / 898 u/L / x     / x      CARDIAC MARKERS ( 21 Aug 2018 07:47 )  x     / x     / 1013 u/L / 4.61 ng/mL / x      CARDIAC MARKERS ( 20 Aug 2018 23:00 )  x     / x     / 1022 u/L / 4.71 ng/mL / x      CARDIAC MARKERS ( 20 Aug 2018 20:30 )  x     / x     / 1026 u/L / 4.54 ng/mL / x            T(C): 36.3 (08-23-18 @ 12:22), Max: 36.4 (08-22-18 @ 21:02)  HR: 78 (08-23-18 @ 12:22) (76 - 86)  BP: 110/74 (08-23-18 @ 12:22) (93/62 - 120/85)  RR: 18 (08-23-18 @ 12:22) (17 - 18)  SpO2: 100% (08-23-18 @ 12:22) (98% - 100%)  Wt(kg): --    I&O's Summary    22 Aug 2018 07:01  -  23 Aug 2018 07:00  --------------------------------------------------------  IN: 320 mL / OUT: 2350 mL / NET: -2030 mL    23 Aug 2018 07:01  -  23 Aug 2018 14:13  --------------------------------------------------------  IN: 0 mL / OUT: 900 mL / NET: -900 mL        Cardiovascular:  S1S2 RRR, JVP 20  Respiratory: Lungs clear to auscultation, normal effort  Gastrointestinal: Abdomen soft, ND, NT, +BS  Skin: Warm, dry, intact. No rash.  Musculoskeletal: Normal ROM, normal strength  Ext: 2+ le edema BL    DIAGNOSTIC DATA  TELEMETRY:     < from: TTE with Doppler (w/Cont) (06.29.18 @ 10:45) >  CONCLUSIONS:  1. Mitral annular calcification, otherwise normal mitral  valve. Moderate mitral regurgitation.  2. Moderately dilated left atrium.  LA volume index = 42  cc/m2.  3. Moderate left ventricular enlargement.  4. Severe global left ventricular systolic dysfunction.  Endocardial visualization enhanced with intravenous  injection of echo contrast (Definity).  No LV thrombus  seen.  5. Moderate right atrial enlargement.  6. Right ventricular enlargement with decreased right  ventricular systolic function.  A device wire is noted in  the right heart.  7. Normal tricuspid valve.  Severe tricuspid regurgitation.  *** Compared with echocardiogram of 4/26/2018,no  significant changes noted.  ------------------------------------------------------------------------  Confirmed on  6/29/2018 - 14:06:25 by Xander Tyson M.D.    < end of copied text >      < from: Cardiac Cath Lab - Adult (05.25.18 @ 11:46) >  HEMODYNAMICS: There is severe left sided failure.  COMPLICATIONS: There were no complications.  DIAGNOSTIC RECOMMENDATIONS: Right heart catheterization demonstrates  elevated PCWP and low cardiac output. Will transfer patient to the CCU for  inotrope assisted diuresis.  Prepared and signed by  Kunal Muller M.D.  Signed 05/29/2018 16:07:27    < end of copied text >    < from: US Abdomen Complete (08.21.18 @ 12:05) >  IMPRESSION:     Cholelithiasis.    < end of copied text >      ASSESSMENT AND PLAN:  She is a pleasant 63 y/o female PMH severe NICM (LVEF 10-15%) who had a [employer] Biv-ICD implanted at [place], hx DM, HTN, thyroid ca s/p thyroidectomy, recent LHC at [place] was also unremarkable.  She was hospitalized last month for decompensated CHF requiring ionotrope assisted diuresis.  She comes to ED with c/o 10 lb weight gain in 1 week, LE edema, orthopnea, c/w acute on chronic systolic HF exacerbation, also with hypocalcemia, MAGNUS, elevated LFTs and elevated CPK.      -continue with IV diuresis to keep O > I  -pt. improving but still volume overloaded  -follow up TTE  -ep follow up   -GI workup in progress - possible liver biopsy pending above    Kunal Muller MD

## 2018-08-24 LAB
ALBUMIN SERPL ELPH-MCNC: 3.4 G/DL — SIGNIFICANT CHANGE UP (ref 3.3–5)
ALP SERPL-CCNC: 424 U/L — HIGH (ref 40–120)
ALT FLD-CCNC: 36 U/L — HIGH (ref 4–33)
AST SERPL-CCNC: 68 U/L — HIGH (ref 4–32)
BASOPHILS # BLD AUTO: 0.06 K/UL — SIGNIFICANT CHANGE UP (ref 0–0.2)
BASOPHILS NFR BLD AUTO: 0.9 % — SIGNIFICANT CHANGE UP (ref 0–2)
BILIRUB DIRECT SERPL-MCNC: 6.9 MG/DL — HIGH (ref 0.1–0.2)
BILIRUB SERPL-MCNC: 10 MG/DL — HIGH (ref 0.2–1.2)
BUN SERPL-MCNC: 35 MG/DL — HIGH (ref 7–23)
CALCIUM SERPL-MCNC: 7.3 MG/DL — LOW (ref 8.4–10.5)
CERULOPLASMIN SERPL-MCNC: 76 MG/DL — HIGH (ref 20–60)
CHLORIDE SERPL-SCNC: 82 MMOL/L — LOW (ref 98–107)
CK SERPL-CCNC: 552 U/L — HIGH (ref 25–170)
CO2 SERPL-SCNC: 29 MMOL/L — SIGNIFICANT CHANGE UP (ref 22–31)
CREAT SERPL-MCNC: 1.13 MG/DL — SIGNIFICANT CHANGE UP (ref 0.5–1.3)
EOSINOPHIL # BLD AUTO: 0.04 K/UL — SIGNIFICANT CHANGE UP (ref 0–0.5)
EOSINOPHIL NFR BLD AUTO: 0.6 % — SIGNIFICANT CHANGE UP (ref 0–6)
GLUCOSE SERPL-MCNC: 128 MG/DL — HIGH (ref 70–99)
HCT VFR BLD CALC: 35.9 % — SIGNIFICANT CHANGE UP (ref 34.5–45)
HGB BLD-MCNC: 12.1 G/DL — SIGNIFICANT CHANGE UP (ref 11.5–15.5)
IMM GRANULOCYTES # BLD AUTO: 0.04 # — SIGNIFICANT CHANGE UP
IMM GRANULOCYTES NFR BLD AUTO: 0.6 % — SIGNIFICANT CHANGE UP (ref 0–1.5)
LYMPHOCYTES # BLD AUTO: 1.2 K/UL — SIGNIFICANT CHANGE UP (ref 1–3.3)
LYMPHOCYTES # BLD AUTO: 17.1 % — SIGNIFICANT CHANGE UP (ref 13–44)
MAGNESIUM SERPL-MCNC: 1.8 MG/DL — SIGNIFICANT CHANGE UP (ref 1.6–2.6)
MCHC RBC-ENTMCNC: 22.9 PG — LOW (ref 27–34)
MCHC RBC-ENTMCNC: 33.7 % — SIGNIFICANT CHANGE UP (ref 32–36)
MCV RBC AUTO: 67.9 FL — LOW (ref 80–100)
MONOCYTES # BLD AUTO: 0.83 K/UL — SIGNIFICANT CHANGE UP (ref 0–0.9)
MONOCYTES NFR BLD AUTO: 11.8 % — SIGNIFICANT CHANGE UP (ref 2–14)
NEUTROPHILS # BLD AUTO: 4.86 K/UL — SIGNIFICANT CHANGE UP (ref 1.8–7.4)
NEUTROPHILS NFR BLD AUTO: 69 % — SIGNIFICANT CHANGE UP (ref 43–77)
NRBC # FLD: 0.03 — SIGNIFICANT CHANGE UP
PLATELET # BLD AUTO: 191 K/UL — SIGNIFICANT CHANGE UP (ref 150–400)
PMV BLD: SIGNIFICANT CHANGE UP FL (ref 7–13)
POTASSIUM SERPL-MCNC: 3.5 MMOL/L — SIGNIFICANT CHANGE UP (ref 3.5–5.3)
POTASSIUM SERPL-SCNC: 3.5 MMOL/L — SIGNIFICANT CHANGE UP (ref 3.5–5.3)
PROT SERPL-MCNC: 7.2 G/DL — SIGNIFICANT CHANGE UP (ref 6–8.3)
RBC # BLD: 5.29 M/UL — HIGH (ref 3.8–5.2)
RBC # FLD: 22.4 % — HIGH (ref 10.3–14.5)
SODIUM SERPL-SCNC: 128 MMOL/L — LOW (ref 135–145)
WBC # BLD: 7.03 K/UL — SIGNIFICANT CHANGE UP (ref 3.8–10.5)
WBC # FLD AUTO: 7.03 K/UL — SIGNIFICANT CHANGE UP (ref 3.8–10.5)

## 2018-08-24 RX ORDER — FUROSEMIDE 40 MG
80 TABLET ORAL
Qty: 0 | Refills: 0 | Status: DISCONTINUED | OUTPATIENT
Start: 2018-08-24 | End: 2018-08-26

## 2018-08-24 RX ORDER — POTASSIUM CHLORIDE 20 MEQ
40 PACKET (EA) ORAL DAILY
Qty: 0 | Refills: 0 | Status: DISCONTINUED | OUTPATIENT
Start: 2018-08-25 | End: 2018-08-28

## 2018-08-24 RX ORDER — POTASSIUM CHLORIDE 20 MEQ
40 PACKET (EA) ORAL ONCE
Qty: 0 | Refills: 0 | Status: COMPLETED | OUTPATIENT
Start: 2018-08-24 | End: 2018-08-24

## 2018-08-24 RX ADMIN — Medication 2 TABLET(S): at 05:50

## 2018-08-24 RX ADMIN — Medication 1: at 12:45

## 2018-08-24 RX ADMIN — Medication 40 MILLIEQUIVALENT(S): at 09:50

## 2018-08-24 RX ADMIN — MAGNESIUM OXIDE 400 MG ORAL TABLET 400 MILLIGRAM(S): 241.3 TABLET ORAL at 09:50

## 2018-08-24 RX ADMIN — CALCITRIOL 0.5 MICROGRAM(S): 0.5 CAPSULE ORAL at 05:50

## 2018-08-24 RX ADMIN — MAGNESIUM OXIDE 400 MG ORAL TABLET 400 MILLIGRAM(S): 241.3 TABLET ORAL at 12:45

## 2018-08-24 RX ADMIN — Medication 175 MICROGRAM(S): at 05:50

## 2018-08-24 RX ADMIN — Medication 2 TABLET(S): at 22:11

## 2018-08-24 RX ADMIN — Medication 1: at 17:01

## 2018-08-24 RX ADMIN — Medication 2 TABLET(S): at 15:13

## 2018-08-24 RX ADMIN — MAGNESIUM OXIDE 400 MG ORAL TABLET 400 MILLIGRAM(S): 241.3 TABLET ORAL at 17:01

## 2018-08-24 RX ADMIN — CALCITRIOL 0.5 MICROGRAM(S): 0.5 CAPSULE ORAL at 17:01

## 2018-08-24 RX ADMIN — Medication 1000 UNIT(S): at 12:45

## 2018-08-24 RX ADMIN — Medication 81 MILLIGRAM(S): at 12:45

## 2018-08-24 NOTE — PROGRESS NOTE ADULT - SUBJECTIVE AND OBJECTIVE BOX
S: no chest pain     aspirin enteric coated 81 milliGRAM(s) Oral daily  calcitriol   Capsule 0.5 MICROGram(s) Oral two times a day  calcium carbonate    500 mG (Tums) Chewable 2 Tablet(s) Chew three times a day  cholecalciferol 1000 Unit(s) Oral daily  dextrose 40% Gel 15 Gram(s) Oral once PRN  dextrose 5%. 1000 milliLiter(s) IV Continuous <Continuous>  dextrose 50% Injectable 12.5 Gram(s) IV Push once  dextrose 50% Injectable 25 Gram(s) IV Push once  dextrose 50% Injectable 25 Gram(s) IV Push once  furosemide   Injectable 80 milliGRAM(s) IV Push two times a day  glucagon  Injectable 1 milliGRAM(s) IntraMuscular once PRN  heparin  Injectable 5000 Unit(s) SubCutaneous every 12 hours  insulin lispro (HumaLOG) corrective regimen sliding scale   SubCutaneous three times a day before meals  insulin lispro (HumaLOG) corrective regimen sliding scale   SubCutaneous at bedtime  levothyroxine 175 MICROGram(s) Oral daily  magnesium oxide 400 milliGRAM(s) Oral three times a day with meals  metoprolol succinate ER 25 milliGRAM(s) Oral daily  pantoprazole    Tablet 40 milliGRAM(s) Oral before breakfast  potassium chloride    Tablet ER 40 milliEquivalent(s) Oral once                            12.1   7.03  )-----------( 191      ( 24 Aug 2018 07:00 )             35.9       08-24    128<L>  |  82<L>  |  35<H>  ----------------------------<  128<H>  3.5   |  29  |  1.13    Ca    7.3<L>      24 Aug 2018 07:00  Mg     1.8     08-24    TPro  7.2  /  Alb  3.4  /  TBili  10.0<H>  /  DBili  6.9<H>  /  AST  68<H>  /  ALT  36<H>  /  AlkPhos  424<H>  08-24      CARDIAC MARKERS ( 24 Aug 2018 07:00 )  x     / x     / 552 u/L / x     / x      CARDIAC MARKERS ( 23 Aug 2018 05:45 )  x     / x     / 773 u/L / x     / x      CARDIAC MARKERS ( 22 Aug 2018 06:00 )  x     / x     / 898 u/L / x     / x            T(C): 36.6 (08-24-18 @ 05:47), Max: 36.7 (08-23-18 @ 20:12)  HR: 79 (08-24-18 @ 05:47) (78 - 88)  BP: 99/68 (08-24-18 @ 05:47) (99/68 - 110/74)  RR: 17 (08-24-18 @ 05:47) (17 - 18)  SpO2: 100% (08-24-18 @ 05:47) (100% - 100%)  Wt(kg): --    I&O's Summary    23 Aug 2018 07:01  -  24 Aug 2018 07:00  --------------------------------------------------------  IN: 820 mL / OUT: 2700 mL / NET: -1880 mL          Cardiovascular:  S1S2 RRR, no m/r/g/  Respiratory: Lungs clear to auscultation, normal effort  Gastrointestinal: Abdomen soft, ND, NT, +BS  Skin: Warm, dry, intact. No rash.  Musculoskeletal: Normal ROM, normal strength  Ext: 2+ le edema BL    DIAGNOSTIC DATA  TELEMETRY:     < from: TTE with Doppler (w/Cont) (06.29.18 @ 10:45) >  CONCLUSIONS:  1. Mitral annular calcification, otherwise normal mitral  valve. Moderate mitral regurgitation.  2. Moderately dilated left atrium.  LA volume index = 42  cc/m2.  3. Moderate left ventricular enlargement.  4. Severe global left ventricular systolic dysfunction.  Endocardial visualization enhanced with intravenous  injection of echo contrast (Definity).  No LV thrombus  seen.  5. Moderate right atrial enlargement.  6. Right ventricular enlargement with decreased right  ventricular systolic function.  A device wire is noted in  the right heart.  7. Normal tricuspid valve.  Severe tricuspid regurgitation.  *** Compared with echocardiogram of 4/26/2018,no  significant changes noted.  ------------------------------------------------------------------------  Confirmed on  6/29/2018 - 14:06:25 by Xander Tyson M.D.    < end of copied text >      < from: Cardiac Cath Lab - Adult (05.25.18 @ 11:46) >  HEMODYNAMICS: There is severe left sided failure.  COMPLICATIONS: There were no complications.  DIAGNOSTIC RECOMMENDATIONS: Right heart catheterization demonstrates  elevated PCWP and low cardiac output. Will transfer patient to the CCU for  inotrope assisted diuresis.  Prepared and signed by  Kunal Muller M.D.  Signed 05/29/2018 16:07:27    < end of copied text >    < from: US Abdomen Complete (08.21.18 @ 12:05) >  IMPRESSION:     Cholelithiasis.       Transthoracic Echocardiogram (08.23.18 @ 11:25) >  CONCLUSIONS:  1. Tethered mitral valve leaflets with normal opening.  Moderate-severe mitral regurgitation.  2. Normal left ventricular internal dimensions and wall  thicknesses.  3. Severe global left ventricular systolic dysfunction.  4. Moderate right atrial enlargement.  A device wire is  noted in the right heart.  5. Right ventricular enlargement with decreased right  ventricular systolic function.  6. Normal tricuspid valve.  Severe tricuspid regurgitation.  7. Estimated pulmonary artery systolic pressure equals 24  mm Hg, assuming right atrial pressure equals 10  mm Hg,  consistent with normal pulmonary pressures.  *** Compared with echocardiogram of 6/29/2018, no  significant changes noted.        ASSESSMENT AND PLAN:  She is a pleasant 63 y/o female PMH severe NICM (LVEF 10-15%) who had a [employer]onic Biv-ICD implanted at [place], hx DM, HTN, thyroid ca s/p thyroidectomy, recent LHC at [place] was also unremarkable.  She was hospitalized last month for decompensated CHF requiring ionotrope assisted diuresis.  She comes to ED with c/o 10 lb weight gain in 1 week, LE edema, orthopnea, c/w acute on chronic systolic HF exacerbation, also with hypocalcemia, MAGNUS, elevated LFTs and elevated CPK.      -continue with IV diuresis to keep O > I  -TTE with severe LV dysfunction and mod-severe MR  -may need ANDER after adequately diuresed to assess MR  -follow up renal  -Liver biopsy per GI  -replete sherrie Muller MD

## 2018-08-24 NOTE — PROGRESS NOTE ADULT - SUBJECTIVE AND OBJECTIVE BOX
INTERVAL HPI/OVERNIGHT EVENTS: I feel better.   Vital Signs Last 24 Hrs  T(C): 36.1 (24 Aug 2018 14:26), Max: 36.7 (23 Aug 2018 20:12)  T(F): 97 (24 Aug 2018 14:26), Max: 98 (23 Aug 2018 20:12)  HR: 82 (24 Aug 2018 14:26) (71 - 88)  BP: 114/78 (24 Aug 2018 14:26) (98/65 - 114/78)  BP(mean): --  RR: 18 (24 Aug 2018 14:26) (17 - 18)  SpO2: 98% (24 Aug 2018 14:26) (98% - 100%)  I&O's Summary    23 Aug 2018 07:01  -  24 Aug 2018 07:00  --------------------------------------------------------  IN: 820 mL / OUT: 2700 mL / NET: -1880 mL    24 Aug 2018 07:01  -  24 Aug 2018 16:30  --------------------------------------------------------  IN: 0 mL / OUT: 1200 mL / NET: -1200 mL      MEDICATIONS  (STANDING):  aspirin enteric coated 81 milliGRAM(s) Oral daily  calcitriol   Capsule 0.5 MICROGram(s) Oral two times a day  calcium carbonate    500 mG (Tums) Chewable 2 Tablet(s) Chew three times a day  cholecalciferol 1000 Unit(s) Oral daily  dextrose 5%. 1000 milliLiter(s) (50 mL/Hr) IV Continuous <Continuous>  dextrose 50% Injectable 12.5 Gram(s) IV Push once  dextrose 50% Injectable 25 Gram(s) IV Push once  dextrose 50% Injectable 25 Gram(s) IV Push once  furosemide   Injectable 80 milliGRAM(s) IV Push two times a day  heparin  Injectable 5000 Unit(s) SubCutaneous every 12 hours  insulin lispro (HumaLOG) corrective regimen sliding scale   SubCutaneous three times a day before meals  insulin lispro (HumaLOG) corrective regimen sliding scale   SubCutaneous at bedtime  levothyroxine 175 MICROGram(s) Oral daily  magnesium oxide 400 milliGRAM(s) Oral three times a day with meals  metoprolol succinate ER 25 milliGRAM(s) Oral daily  pantoprazole    Tablet 40 milliGRAM(s) Oral before breakfast    MEDICATIONS  (PRN):  dextrose 40% Gel 15 Gram(s) Oral once PRN Blood Glucose LESS THAN 70 milliGRAM(s)/deciliter  glucagon  Injectable 1 milliGRAM(s) IntraMuscular once PRN Glucose LESS THAN 70 milligrams/deciliter    LABS:                        12.1   7.03  )-----------( 191      ( 24 Aug 2018 07:00 )             35.9     08-24    128<L>  |  82<L>  |  35<H>  ----------------------------<  128<H>  3.5   |  29  |  1.13    Ca    7.3<L>      24 Aug 2018 07:00  Mg     1.8     08-24    TPro  7.2  /  Alb  3.4  /  TBili  10.0<H>  /  DBili  6.9<H>  /  AST  68<H>  /  ALT  36<H>  /  AlkPhos  424<H>  08-24        CAPILLARY BLOOD GLUCOSE      POCT Blood Glucose.: 172 mg/dL (24 Aug 2018 11:54)  POCT Blood Glucose.: 114 mg/dL (24 Aug 2018 08:54)  POCT Blood Glucose.: 142 mg/dL (23 Aug 2018 22:45)  POCT Blood Glucose.: 144 mg/dL (23 Aug 2018 21:36)  POCT Blood Glucose.: 224 mg/dL (23 Aug 2018 17:16)          REVIEW OF SYSTEMS:  CONSTITUTIONAL: No fever, weight loss, or fatigue  EYES: No eye pain, visual disturbances, or discharge  ENMT:  No difficulty hearing, tinnitus, vertigo; No sinus or throat pain  NECK: No pain or stiffness  RESPIRATORY: No cough, wheezing, chills or hemoptysis;  shortness of breath  CARDIOVASCULAR: No chest pain, palpitations, dizziness, or leg swelling  GASTROINTESTINAL: No abdominal or epigastric pain. No nausea, vomiting, or hematemesis; No diarrhea or constipation. No melena or hematochezia.  GENITOURINARY: No dysuria, frequency, hematuria, or incontinence  NEUROLOGICAL: No headaches, memory loss, loss of strength, numbness, or tremors      Consultant(s) Notes Reviewed:  [x ] YES  [ ] NO    PHYSICAL EXAM:  GENERAL: NAD, well-groomed, well-developed,not in any distress ,  HEAD:  Atraumatic, Normocephalic  EYES: EOMI, PERRLA, conjunctiva and sclera clear  ENMT: No tonsillar erythema, exudates, or enlargement; Moist mucous membranes, Good dentition, No lesions  NECK: Supple, ++ JVD, Normal thyroid  NERVOUS SYSTEM:  Alert & Oriented X3, No focal deficit   CHEST/LUNG: Good air entry bilateral with no  rales, rhonchi, wheezing, or rubs  HEART: Regular rate and rhythm; No murmurs, rubs, or gallops  ABDOMEN: Soft, Nontender, Nondistended; Bowel sounds present  EXTREMITIES:  2+ Peripheral Pulses, No clubbing, cyanosis, but 2+edema  SKIN: No rashes or lesions    Care Discussed with Consultants/Other Providers [ x] YES  [ ] NO

## 2018-08-24 NOTE — PROGRESS NOTE ADULT - SUBJECTIVE AND OBJECTIVE BOX
Patient with no anginal chest pain ; dyspnea improved         MEDICATIONS  (STANDING):  aspirin enteric coated 81 milliGRAM(s) Oral daily  calcitriol   Capsule 0.5 MICROGram(s) Oral two times a day  calcium carbonate    500 mG (Tums) Chewable 2 Tablet(s) Chew three times a day  cholecalciferol 1000 Unit(s) Oral daily  furosemide   Injectable 80 milliGRAM(s) IV Push two times a day  heparin  Injectable 5000 Unit(s) SubCutaneous every 12 hours  hydrochlorothiazide 50 milliGRAM(s) Oral two times a day  insulin lispro (HumaLOG) corrective regimen sliding scale   SubCutaneous three times a day before meals  insulin lispro (HumaLOG) corrective regimen sliding scale   SubCutaneous at bedtime  levothyroxine 175 MICROGram(s) Oral daily  magnesium oxide 400 milliGRAM(s) Oral three times a day with meals  metoprolol succinate ER 25 milliGRAM(s) Oral daily  pantoprazole    Tablet 40 milliGRAM(s) Oral before breakfast    MEDICATIONS  (PRN):  dextrose 40% Gel 15 Gram(s) Oral once PRN Blood Glucose LESS THAN 70 milliGRAM(s)/deciliter  glucagon  Injectable 1 milliGRAM(s) IntraMuscular once PRN Glucose LESS THAN 70 milligrams/deciliter  guaiFENesin   Syrup  (Sugar-Free) 100 milliGRAM(s) Oral every 6 hours PRN Cough      LABS:                        12.4   6.95  )-----------( 217      ( 23 Aug 2018 05:45 )             36.0     Hemoglobin: 12.4 g/dL (08-23 @ 05:45)  Hemoglobin: 11.9 g/dL (08-22 @ 06:00)  Hemoglobin: 12.1 g/dL (08-21 @ 07:47)  Hemoglobin: 11.4 g/dL (08-20 @ 20:30)    08-23    130<L>  |  79<L>  |  40<H>  ----------------------------<  137<H>  2.9<LL>   |  31  |  1.27    Ca    7.1<L>      23 Aug 2018 05:45  Mg     1.9     08-23    TPro  7.7  /  Alb  3.7  /  TBili  12.7<H>  /  DBili  9.2<H>  /  AST  67<H>  /  ALT  35<H>  /  AlkPhos  461<H>  08-23    Creatinine Trend: 1.27<--, 1.34<--, 1.51<--, 1.78<--     CARDIAC MARKERS ( 23 Aug 2018 05:45 )  x     / x     / 773 u/L / x     / x      CARDIAC MARKERS ( 22 Aug 2018 06:00 )  x     / x     / 898 u/L / x     / x            PHYSICAL EXAM  Vital Signs Last 24 Hrs  T(C): 36.6 (24 Aug 2018 05:47), Max: 36.7 (23 Aug 2018 20:12)  T(F): 97.8 (24 Aug 2018 05:47), Max: 98 (23 Aug 2018 20:12)  HR: 79 (24 Aug 2018 05:47) (76 - 88)  BP: 99/68 (24 Aug 2018 05:47) (99/68 - 110/74)  BP(mean): --  RR: 17 (24 Aug 2018 05:47) (17 - 18)  SpO2: 100% (24 Aug 2018 05:47) (98% - 100%)      Cardiovascular:  S1S2 RRR, JVP 20  Respiratory: Lungs clear to auscultation, normal effort  Gastrointestinal: Abdomen soft, ND, NT, +BS  Skin: Warm, dry, intact. No rash.  Musculoskeletal: Normal ROM, normal strength  Ext: 2+ le edema BL    DIAGNOSTIC DATA  TELEMETRY:     < from: TTE with Doppler (w/Cont) (06.29.18 @ 10:45) >  CONCLUSIONS:  1. Mitral annular calcification, otherwise normal mitral  valve. Moderate mitral regurgitation.  2. Moderately dilated left atrium.  LA volume index = 42  cc/m2.  3. Moderate left ventricular enlargement.  4. Severe global left ventricular systolic dysfunction.  Endocardial visualization enhanced with intravenous  injection of echo contrast (Definity).  No LV thrombus  seen.  5. Moderate right atrial enlargement.  6. Right ventricular enlargement with decreased right  ventricular systolic function.  A device wire is noted in  the right heart.  7. Normal tricuspid valve.  Severe tricuspid regurgitation.  *** Compared with echocardiogram of 4/26/2018,no  significant changes noted.  ------------------------------------------------------------------------  Confirmed on  6/29/2018 - 14:06:25 by Xander Tyson M.D.    < end of copied text >      < from: Cardiac Cath Lab - Adult (05.25.18 @ 11:46) >  HEMODYNAMICS: There is severe left sided failure.  COMPLICATIONS: There were no complications.  DIAGNOSTIC RECOMMENDATIONS: Right heart catheterization demonstrates  elevated PCWP and low cardiac output. Will transfer patient to the CCU for  inotrope assisted diuresis.  Prepared and signed by  Kunal Muller M.D.  Signed 05/29/2018 16:07:27    < end of copied text >    < from: US Abdomen Complete (08.21.18 @ 12:05) >  IMPRESSION:     Cholelithiasis.       Transthoracic Echocardiogram (08.23.18 @ 11:25) >  CONCLUSIONS:  1. Tethered mitral valve leaflets with normal opening.  Moderate-severe mitral regurgitation.  2. Normal left ventricular internal dimensions and wall  thicknesses.  3. Severe global left ventricular systolic dysfunction.  4. Moderate right atrial enlargement.  A device wire is  noted in the right heart.  5. Right ventricular enlargement with decreased right  ventricular systolic function.  6. Normal tricuspid valve.  Severe tricuspid regurgitation.  7. Estimated pulmonary artery systolic pressure equals 24  mm Hg, assuming right atrial pressure equals 10  mm Hg,  consistent with normal pulmonary pressures.  *** Compared with echocardiogram of 6/29/2018, no  significant changes noted.        ASSESSMENT AND PLAN:  She is a pleasant 61 y/o female PMH severe NICM (LVEF 10-15%) who had a [employer]onic Biv-ICD implanted at [place], hx DM, HTN, thyroid ca s/p thyroidectomy, recent LHC at [place] was also unremarkable.  She was hospitalized last month for decompensated CHF requiring ionotrope assisted diuresis.  She comes to ED with c/o 10 lb weight gain in 1 week, LE edema, orthopnea, c/w acute on chronic systolic HF exacerbation, also with hypocalcemia, MAGNUS, elevated LFTs and elevated CPK.      -continue with IV diuresis to keep O > I  -renal appreciated - follow up BMP especially creatinine and potassium  -pt. improving but still volume overloaded  -TTE noted - unchanged severe LV dysfxn with unchanged mod-severe MR/severe TR  -ep follow up noted - BV-ICD has been optimized  - c/w ASA/BB  -GI workup in progress - possible liver biopsy when patient is euvolemic

## 2018-08-24 NOTE — PROGRESS NOTE ADULT - SUBJECTIVE AND OBJECTIVE BOX
EP ATTENDING    tele: NSR, BiV-pacing    less dyspnea, no palpitations, no syncope, + jaundice    aspirin enteric coated 81 milliGRAM(s) Oral daily  calcitriol   Capsule 0.5 MICROGram(s) Oral two times a day  calcium carbonate    500 mG (Tums) Chewable 2 Tablet(s) Chew three times a day  cholecalciferol 1000 Unit(s) Oral daily  dextrose 40% Gel 15 Gram(s) Oral once PRN  dextrose 5%. 1000 milliLiter(s) IV Continuous <Continuous>  dextrose 50% Injectable 12.5 Gram(s) IV Push once  dextrose 50% Injectable 25 Gram(s) IV Push once  dextrose 50% Injectable 25 Gram(s) IV Push once  furosemide   Injectable 80 milliGRAM(s) IV Push two times a day  glucagon  Injectable 1 milliGRAM(s) IntraMuscular once PRN  guaiFENesin   Syrup  (Sugar-Free) 100 milliGRAM(s) Oral every 6 hours PRN  heparin  Injectable 5000 Unit(s) SubCutaneous every 12 hours  hydrochlorothiazide 50 milliGRAM(s) Oral two times a day  insulin lispro (HumaLOG) corrective regimen sliding scale   SubCutaneous three times a day before meals  insulin lispro (HumaLOG) corrective regimen sliding scale   SubCutaneous at bedtime  levothyroxine 175 MICROGram(s) Oral daily  magnesium oxide 400 milliGRAM(s) Oral three times a day with meals  metoprolol succinate ER 25 milliGRAM(s) Oral daily  pantoprazole    Tablet 40 milliGRAM(s) Oral before breakfast                            12.4   6.95  )-----------( 217      ( 23 Aug 2018 05:45 )             36.0       08-23    130<L>  |  79<L>  |  40<H>  ----------------------------<  137<H>  2.9<LL>   |  31  |  1.27    Ca    7.1<L>      23 Aug 2018 05:45  Mg     1.9     08-23    TPro  7.7  /  Alb  3.7  /  TBili  12.7<H>  /  DBili  9.2<H>  /  AST  67<H>  /  ALT  35<H>  /  AlkPhos  461<H>  08-23      CARDIAC MARKERS ( 23 Aug 2018 05:45 )  x     / x     / 773 u/L / x     / x      CARDIAC MARKERS ( 22 Aug 2018 06:00 )  x     / x     / 898 u/L / x     / x            T(C): 36.6 (08-24-18 @ 05:47), Max: 36.7 (08-23-18 @ 20:12)  HR: 79 (08-24-18 @ 05:47) (76 - 88)  BP: 99/68 (08-24-18 @ 05:47) (99/68 - 110/74)  RR: 17 (08-24-18 @ 05:47) (17 - 18)  SpO2: 100% (08-24-18 @ 05:47) (98% - 100%)  Wt(kg): --    JVP 12  RRR, no murmurs  decreased BS  soft nt/nd  + 2 edema    ASSESSMENT/PLAN: She is a pleasant 63 y/o female PMH severe NICM (LVEF 10-15%) who had a [employer]onic Biv-ICD implanted at [place], hx DM, HTN, thyroid ca s/p thyroidectomy, recent LHC was also unremarkable.  She was hospitalized last month for decompensated CHF requiring ionotrope assisted diuresis.  ICD interrogation at that time with no events.  She comes to ED with c/o 10 lb weight gain in 1 week, LE edema, orthopnea, c/w acute on chronic systolic HF exacerbation, also with hypocalcemia, MAGNUS, elevated LFTs and elevated CPK.      -IV diuresis  -Renal Eval  -GI eval  -BV-ICD has been optimized. Management of CHF as per cardiology  -if potassium remains low would start standing supplemental po KCL

## 2018-08-24 NOTE — PROGRESS NOTE ADULT - SUBJECTIVE AND OBJECTIVE BOX
No pain, no shortness of breath      VITAL:  T(C): , Max: 36.7 (08-23-18 @ 20:12)  T(F): , Max: 98 (08-23-18 @ 20:12)  HR: 79 (08-24-18 @ 05:47)  BP: 99/68 (08-24-18 @ 05:47)  RR: 17 (08-24-18 @ 05:47)  SpO2: 100% (08-24-18 @ 05:47)      PHYSICAL EXAM:  Constitutional: NAD, Alert  HEENT: NCAT, MMM; (+)scleral icterus  Neck: Supple, (+) JVD  Respiratory: CTA-b/l  Cardiovascular: RRR s1s2, no m/r/g  Gastrointestinal: BS+, soft, NT/ND  Extremities: 2+ b/l LE edema  Neurological: no focal deficits; strength grossly intact  Back: no CVAT b/l  Skin: No rashes, no nevi      LABS:                        12.1   7.03  )-----------( 191      ( 24 Aug 2018 07:00 )             35.9     Na(130)/K(2.9)/Cl(79)/HCO3(31)/BUN(40)/Cr(1.27)Glu(137)/Ca(7.1)/Mg(1.9)/PO4(--)    08-23 @ 05:45  Na(126)/K(4.1)/Cl(79)/HCO3(23)/BUN(45)/Cr(1.34)Glu(117)/Ca(6.4)/Mg(1.8)/PO4(4.8)    08-22 @ 06:00      Sodium, Random Urine: < 20 mmol/L (08-22 @ 12:30)  Potassium, Random Urine: 32.7 mmol/L (08-22 @ 12:30)  Chloride, Random Urine: < 20 mmol/L (08-22 @ 12:30)  Osmolality, Random Urine: 298 mosmo/kg (08-22 @ 12:30)      IMPRESSION: 62F w/ HFrEF (10%)-AICD, DM2, HTN, thyroid CA-thyroidectomy, and hypoparathyroidism, 8/20/18 a/w acute on chronic HFrEF/MAGNUS    (1)Renal - MAGNUS - prerenally mediated in association with her decompensated CHF. Substantially improved over past few days, with diuresis    (2)Hypocalcemia - hypoparathyroidism - Ca low but trending up, on combination of Tums, Calcitriol, and HCTZ. Labs today pending.    (3)Hyponatremia - hypervolemic hyponatremia - improved with diuresis. Labs today pending.    (4)Hypokalemia - diuretic-associated. Repleted yesterday. Labs today pending.    (5)Liver - likely to require liver biopsy to rule out amyloid      RECOMMEND:  (1)Tums and Calcitriol, and HCTZ as ordered  (2)Could switch to oral lasix (high-dose) at this point - 80mg po bid?  (3)K+ repletion based on levels  (4)Liver biopsy per GI  (5)BMP daily        Brian Henry MD  [facility]  [phone]

## 2018-08-24 NOTE — PROGRESS NOTE ADULT - SUBJECTIVE AND OBJECTIVE BOX
INTERVAL HPI/OVERNIGHT EVENTS:    pt is without abdominal pain or nausea  tolerating po intake   reports feeling better this morning     MEDICATIONS  (STANDING):  aspirin enteric coated 81 milliGRAM(s) Oral daily  calcitriol   Capsule 0.5 MICROGram(s) Oral two times a day  calcium carbonate    500 mG (Tums) Chewable 2 Tablet(s) Chew three times a day  cholecalciferol 1000 Unit(s) Oral daily  dextrose 5%. 1000 milliLiter(s) (50 mL/Hr) IV Continuous <Continuous>  dextrose 50% Injectable 12.5 Gram(s) IV Push once  dextrose 50% Injectable 25 Gram(s) IV Push once  dextrose 50% Injectable 25 Gram(s) IV Push once  furosemide   Injectable 80 milliGRAM(s) IV Push two times a day  heparin  Injectable 5000 Unit(s) SubCutaneous every 12 hours  insulin lispro (HumaLOG) corrective regimen sliding scale   SubCutaneous three times a day before meals  insulin lispro (HumaLOG) corrective regimen sliding scale   SubCutaneous at bedtime  levothyroxine 175 MICROGram(s) Oral daily  magnesium oxide 400 milliGRAM(s) Oral three times a day with meals  metoprolol succinate ER 25 milliGRAM(s) Oral daily  pantoprazole    Tablet 40 milliGRAM(s) Oral before breakfast    MEDICATIONS  (PRN):  dextrose 40% Gel 15 Gram(s) Oral once PRN Blood Glucose LESS THAN 70 milliGRAM(s)/deciliter  glucagon  Injectable 1 milliGRAM(s) IntraMuscular once PRN Glucose LESS THAN 70 milligrams/deciliter      Allergies    Isordil (Headache)  penicillin (Rash)    Intolerances        Review of Systems:    General:  No wt loss, fevers, chills, night sweats, fatigue   Eyes:  Good vision, no reported pain  ENT:  No sore throat, pain, runny nose, dysphagia  CV:  No pain, palpitations, hypo/hypertension  Resp:  No dyspnea, cough, tachypnea, wheezing  GI:  No pain, No nausea, No vomiting, No diarrhea, No constipation, No weight loss, No fever, No pruritis, No rectal bleeding, No melena, No dysphagia  :  No pain, bleeding, incontinence, nocturia  Muscle:  No pain, weakness  Neuro:  No weakness, tingling, memory problems  Psych:  No fatigue, insomnia, mood problems, depression  Endocrine:  No polyuria, polydypsia, cold/heat intolerance  Heme:  No petechiae, ecchymosis, easy bruisability  Skin:  No rash, tattoos, scars, edema      Vital Signs Last 24 Hrs  T(C): 36.6 (24 Aug 2018 09:51), Max: 36.7 (23 Aug 2018 20:12)  T(F): 97.8 (24 Aug 2018 09:51), Max: 98 (23 Aug 2018 20:12)  HR: 71 (24 Aug 2018 09:51) (71 - 88)  BP: 102/66 (24 Aug 2018 09:51) (99/68 - 110/74)  BP(mean): --  RR: 18 (24 Aug 2018 09:51) (17 - 18)  SpO2: 100% (24 Aug 2018 09:51) (100% - 100%)    PHYSICAL EXAM:    Constitutional: NAD  HEENT: EOMI, throat clear; +Icterus  Neck: No LAD, supple  Respiratory: CTA and P  Cardiovascular: S1 and S2, RRR, no M  Gastrointestinal: BS+, soft, NT/ND, neg HSM,  Extremities: No peripheral edema, neg clubbing, cyanosis  Vascular: 2+ peripheral pulses  Neurological: A/O x 3, no focal deficits  Psychiatric: Normal mood, normal affect  Skin: No rashes      LABS:                        12.1   7.03  )-----------( 191      ( 24 Aug 2018 07:00 )             35.9     08-24    128<L>  |  82<L>  |  35<H>  ----------------------------<  128<H>  3.5   |  29  |  1.13    Ca    7.3<L>      24 Aug 2018 07:00  Mg     1.8     08-24    TPro  7.2  /  Alb  3.4  /  TBili  10.0<H>  /  DBili  6.9<H>  /  AST  68<H>  /  ALT  36<H>  /  AlkPhos  424<H>  08-24          RADIOLOGY & ADDITIONAL TESTS:

## 2018-08-25 LAB
A1AT SERPL-MCNC: 224 MG/DL — HIGH (ref 90–200)
BASOPHILS # BLD AUTO: 0.05 K/UL — SIGNIFICANT CHANGE UP (ref 0–0.2)
BASOPHILS NFR BLD AUTO: 0.6 % — SIGNIFICANT CHANGE UP (ref 0–2)
EOSINOPHIL # BLD AUTO: 0.07 K/UL — SIGNIFICANT CHANGE UP (ref 0–0.5)
EOSINOPHIL NFR BLD AUTO: 0.9 % — SIGNIFICANT CHANGE UP (ref 0–6)
HCT VFR BLD CALC: 35.2 % — SIGNIFICANT CHANGE UP (ref 34.5–45)
HGB BLD-MCNC: 11.7 G/DL — SIGNIFICANT CHANGE UP (ref 11.5–15.5)
IMM GRANULOCYTES # BLD AUTO: 0.06 # — SIGNIFICANT CHANGE UP
IMM GRANULOCYTES NFR BLD AUTO: 0.8 % — SIGNIFICANT CHANGE UP (ref 0–1.5)
LYMPHOCYTES # BLD AUTO: 1.67 K/UL — SIGNIFICANT CHANGE UP (ref 1–3.3)
LYMPHOCYTES # BLD AUTO: 21.2 % — SIGNIFICANT CHANGE UP (ref 13–44)
MCHC RBC-ENTMCNC: 23 PG — LOW (ref 27–34)
MCHC RBC-ENTMCNC: 33.2 % — SIGNIFICANT CHANGE UP (ref 32–36)
MCV RBC AUTO: 69.2 FL — LOW (ref 80–100)
MONOCYTES # BLD AUTO: 0.87 K/UL — SIGNIFICANT CHANGE UP (ref 0–0.9)
MONOCYTES NFR BLD AUTO: 11.1 % — SIGNIFICANT CHANGE UP (ref 2–14)
NEUTROPHILS # BLD AUTO: 5.14 K/UL — SIGNIFICANT CHANGE UP (ref 1.8–7.4)
NEUTROPHILS NFR BLD AUTO: 65.4 % — SIGNIFICANT CHANGE UP (ref 43–77)
NRBC # FLD: 0 — SIGNIFICANT CHANGE UP
PLATELET # BLD AUTO: 164 K/UL — SIGNIFICANT CHANGE UP (ref 150–400)
PMV BLD: SIGNIFICANT CHANGE UP FL (ref 7–13)
RBC # BLD: 5.09 M/UL — SIGNIFICANT CHANGE UP (ref 3.8–5.2)
RBC # FLD: 22.3 % — HIGH (ref 10.3–14.5)
WBC # BLD: 7.86 K/UL — SIGNIFICANT CHANGE UP (ref 3.8–10.5)
WBC # FLD AUTO: 7.86 K/UL — SIGNIFICANT CHANGE UP (ref 3.8–10.5)

## 2018-08-25 RX ADMIN — CALCITRIOL 0.5 MICROGRAM(S): 0.5 CAPSULE ORAL at 05:57

## 2018-08-25 RX ADMIN — MAGNESIUM OXIDE 400 MG ORAL TABLET 400 MILLIGRAM(S): 241.3 TABLET ORAL at 12:00

## 2018-08-25 RX ADMIN — MAGNESIUM OXIDE 400 MG ORAL TABLET 400 MILLIGRAM(S): 241.3 TABLET ORAL at 17:09

## 2018-08-25 RX ADMIN — Medication 1: at 17:09

## 2018-08-25 RX ADMIN — Medication 2 TABLET(S): at 21:12

## 2018-08-25 RX ADMIN — Medication 2 TABLET(S): at 05:57

## 2018-08-25 RX ADMIN — PANTOPRAZOLE SODIUM 40 MILLIGRAM(S): 20 TABLET, DELAYED RELEASE ORAL at 06:05

## 2018-08-25 RX ADMIN — Medication 80 MILLIGRAM(S): at 17:55

## 2018-08-25 RX ADMIN — Medication 1000 UNIT(S): at 11:29

## 2018-08-25 RX ADMIN — Medication 175 MICROGRAM(S): at 05:58

## 2018-08-25 RX ADMIN — Medication 1: at 12:12

## 2018-08-25 RX ADMIN — CALCITRIOL 0.5 MICROGRAM(S): 0.5 CAPSULE ORAL at 17:09

## 2018-08-25 RX ADMIN — Medication 80 MILLIGRAM(S): at 06:06

## 2018-08-25 RX ADMIN — Medication 25 MILLIGRAM(S): at 11:29

## 2018-08-25 RX ADMIN — Medication 2 TABLET(S): at 14:47

## 2018-08-25 RX ADMIN — MAGNESIUM OXIDE 400 MG ORAL TABLET 400 MILLIGRAM(S): 241.3 TABLET ORAL at 08:50

## 2018-08-25 RX ADMIN — Medication 81 MILLIGRAM(S): at 11:28

## 2018-08-25 NOTE — PROGRESS NOTE ADULT - SUBJECTIVE AND OBJECTIVE BOX
INTERVAL HPI/OVERNIGHT EVENTS:  I want to go home.  I feel much better .  Vital Signs Last 24 Hrs  T(C): 36.4 (25 Aug 2018 05:51), Max: 36.6 (24 Aug 2018 09:51)  T(F): 97.5 (25 Aug 2018 05:51), Max: 97.8 (24 Aug 2018 09:51)  HR: 88 (25 Aug 2018 05:51) (71 - 88)  BP: 109/64 (25 Aug 2018 05:51) (98/65 - 114/78)  BP(mean): --  RR: 15 (25 Aug 2018 05:51) (15 - 18)  SpO2: 100% (25 Aug 2018 05:51) (98% - 100%)  I&O's Summary    24 Aug 2018 07:01  -  25 Aug 2018 07:00  --------------------------------------------------------  IN: 250 mL / OUT: 2275 mL / NET: -2025 mL    25 Aug 2018 07:01  -  25 Aug 2018 09:31  --------------------------------------------------------  IN: 0 mL / OUT: 500 mL / NET: -500 mL      MEDICATIONS  (STANDING):  aspirin enteric coated 81 milliGRAM(s) Oral daily  calcitriol   Capsule 0.5 MICROGram(s) Oral two times a day  calcium carbonate    500 mG (Tums) Chewable 2 Tablet(s) Chew three times a day  cholecalciferol 1000 Unit(s) Oral daily  dextrose 5%. 1000 milliLiter(s) (50 mL/Hr) IV Continuous <Continuous>  dextrose 50% Injectable 12.5 Gram(s) IV Push once  dextrose 50% Injectable 25 Gram(s) IV Push once  dextrose 50% Injectable 25 Gram(s) IV Push once  furosemide   Injectable 80 milliGRAM(s) IV Push two times a day  heparin  Injectable 5000 Unit(s) SubCutaneous every 12 hours  insulin lispro (HumaLOG) corrective regimen sliding scale   SubCutaneous three times a day before meals  insulin lispro (HumaLOG) corrective regimen sliding scale   SubCutaneous at bedtime  levothyroxine 175 MICROGram(s) Oral daily  magnesium oxide 400 milliGRAM(s) Oral three times a day with meals  metoprolol succinate ER 25 milliGRAM(s) Oral daily  pantoprazole    Tablet 40 milliGRAM(s) Oral before breakfast  potassium chloride   Powder 40 milliEquivalent(s) Oral daily    MEDICATIONS  (PRN):  dextrose 40% Gel 15 Gram(s) Oral once PRN Blood Glucose LESS THAN 70 milliGRAM(s)/deciliter  glucagon  Injectable 1 milliGRAM(s) IntraMuscular once PRN Glucose LESS THAN 70 milligrams/deciliter    LABS:                        11.7   7.86  )-----------( 164      ( 25 Aug 2018 06:20 )             35.2     08-24    128<L>  |  82<L>  |  35<H>  ----------------------------<  128<H>  3.5   |  29  |  1.13    Ca    7.3<L>      24 Aug 2018 07:00  Mg     1.8     08-24    TPro  7.2  /  Alb  3.4  /  TBili  10.0<H>  /  DBili  6.9<H>  /  AST  68<H>  /  ALT  36<H>  /  AlkPhos  424<H>  08-24        CAPILLARY BLOOD GLUCOSE      POCT Blood Glucose.: 168 mg/dL (24 Aug 2018 21:34)  POCT Blood Glucose.: 171 mg/dL (24 Aug 2018 16:58)  POCT Blood Glucose.: 172 mg/dL (24 Aug 2018 11:54)          REVIEW OF SYSTEMS:  CONSTITUTIONAL: No fever, weight loss, or fatigue  EYES: No eye pain, visual disturbances, or discharge  ENMT:  No difficulty hearing, tinnitus, vertigo; No sinus or throat pain  NECK: No pain or stiffness  RESPIRATORY: No cough, wheezing, chills or hemoptysis; No shortness of breath  CARDIOVASCULAR: No chest pain, palpitations, dizziness, or leg swelling  GASTROINTESTINAL: No abdominal or epigastric pain. No nausea, vomiting, or hematemesis; No diarrhea or constipation. No melena or hematochezia.  GENITOURINARY: No dysuria, frequency, hematuria, or incontinence  NEUROLOGICAL: No headaches, memory loss, loss of strength, numbness, or tremors      Consultant(s) Notes Reviewed:  [x ] YES  [ ] NO    PHYSICAL EXAM:  GENERAL: NAD, well-groomed, well-developed, not in any distress ,  HEAD:  Atraumatic, Normocephalic  EYES: EOMI, PERRLA, conjunctiva and sclera clear  ENMT: No tonsillar erythema, exudates, or enlargement; Moist mucous membranes, Good dentition, No lesions  NECK: Supple, No JVD, Normal thyroid  NERVOUS SYSTEM:  Alert & Oriented X3, No focal deficit   CHEST/LUNG: Good air entry bilateral with few  rales,   HEART: Regular rate and rhythm; No murmurs, rubs, or gallops  ABDOMEN: Soft, Nontender, Nondistended; Bowel sounds present  EXTREMITIES:  2+ Peripheral Pulses, No clubbing, cyanosis, but 2+edema      Care Discussed with Consultants/Other Providers [ x] YES  [ ] NO

## 2018-08-25 NOTE — PROGRESS NOTE ADULT - SUBJECTIVE AND OBJECTIVE BOX
EP ATTENDING    tele: NSR, BiV-pacing    less dyspnea, no palpitations, no syncope, + jaundice    aspirin enteric coated 81 milliGRAM(s) Oral daily  calcitriol   Capsule 0.5 MICROGram(s) Oral two times a day  calcium carbonate    500 mG (Tums) Chewable 2 Tablet(s) Chew three times a day  cholecalciferol 1000 Unit(s) Oral daily  dextrose 40% Gel 15 Gram(s) Oral once PRN  dextrose 5%. 1000 milliLiter(s) IV Continuous <Continuous>  dextrose 50% Injectable 12.5 Gram(s) IV Push once  dextrose 50% Injectable 25 Gram(s) IV Push once  dextrose 50% Injectable 25 Gram(s) IV Push once  furosemide   Injectable 80 milliGRAM(s) IV Push two times a day  glucagon  Injectable 1 milliGRAM(s) IntraMuscular once PRN  heparin  Injectable 5000 Unit(s) SubCutaneous every 12 hours  insulin lispro (HumaLOG) corrective regimen sliding scale   SubCutaneous three times a day before meals  insulin lispro (HumaLOG) corrective regimen sliding scale   SubCutaneous at bedtime  levothyroxine 175 MICROGram(s) Oral daily  magnesium oxide 400 milliGRAM(s) Oral three times a day with meals  metoprolol succinate ER 25 milliGRAM(s) Oral daily  pantoprazole    Tablet 40 milliGRAM(s) Oral before breakfast  potassium chloride   Powder 40 milliEquivalent(s) Oral daily                            11.7   7.86  )-----------( 164      ( 25 Aug 2018 06:20 )             35.2       08-24    128<L>  |  82<L>  |  35<H>  ----------------------------<  128<H>  3.5   |  29  |  1.13    Ca    7.3<L>      24 Aug 2018 07:00  Mg     1.8     08-24    TPro  7.2  /  Alb  3.4  /  TBili  10.0<H>  /  DBili  6.9<H>  /  AST  68<H>  /  ALT  36<H>  /  AlkPhos  424<H>  08-24      CARDIAC MARKERS ( 24 Aug 2018 07:00 )  x     / x     / 552 u/L / x     / x      CARDIAC MARKERS ( 23 Aug 2018 05:45 )  x     / x     / 773 u/L / x     / x            T(C): 36.3 (08-25-18 @ 11:26), Max: 36.4 (08-24-18 @ 12:47)  HR: 84 (08-25-18 @ 11:26) (80 - 88)  BP: 114/75 (08-25-18 @ 11:26) (98/65 - 114/78)  RR: 17 (08-25-18 @ 11:26) (15 - 18)  SpO2: 100% (08-25-18 @ 11:26) (98% - 100%)  Wt(kg): --    JVP 12  RRR, no murmurs  decreased BS  soft nt/nd  + 2 edema    ASSESSMENT/PLAN: She is a pleasant 63 y/o female PMH severe NICM (LVEF 10-15%) who had a [employer] Biv-ICD implanted at [place], hx DM, HTN, thyroid ca s/p thyroidectomy, recent LHC was also unremarkable.  She was hospitalized last month for decompensated CHF requiring ionotrope assisted diuresis.  ICD interrogation at that time with no events.  She comes to ED with c/o 10 lb weight gain in 1 week, LE edema, orthopnea, c/w acute on chronic systolic HF exacerbation, also with hypocalcemia, MAGNUS, elevated LFTs and elevated CPK.      -IV diuresis  -Renal Eval  -GI eval  -BV-ICD has been optimized. Management of CHF as per cardiology  -if potassium remains low would start standing supplemental po KCL

## 2018-08-25 NOTE — PROGRESS NOTE ADULT - SUBJECTIVE AND OBJECTIVE BOX
INTERVAL HPI/OVERNIGHT EVENTS:    pt is without abdominal pain or nausea  tolerating po intake       MEDICATIONS  (STANDING):  aspirin enteric coated 81 milliGRAM(s) Oral daily  calcitriol   Capsule 0.5 MICROGram(s) Oral two times a day  calcium carbonate    500 mG (Tums) Chewable 2 Tablet(s) Chew three times a day  cholecalciferol 1000 Unit(s) Oral daily  dextrose 5%. 1000 milliLiter(s) (50 mL/Hr) IV Continuous <Continuous>  dextrose 50% Injectable 12.5 Gram(s) IV Push once  dextrose 50% Injectable 25 Gram(s) IV Push once  dextrose 50% Injectable 25 Gram(s) IV Push once  furosemide   Injectable 80 milliGRAM(s) IV Push two times a day  heparin  Injectable 5000 Unit(s) SubCutaneous every 12 hours  insulin lispro (HumaLOG) corrective regimen sliding scale   SubCutaneous three times a day before meals  insulin lispro (HumaLOG) corrective regimen sliding scale   SubCutaneous at bedtime  levothyroxine 175 MICROGram(s) Oral daily  magnesium oxide 400 milliGRAM(s) Oral three times a day with meals  metoprolol succinate ER 25 milliGRAM(s) Oral daily  pantoprazole    Tablet 40 milliGRAM(s) Oral before breakfast    MEDICATIONS  (PRN):  dextrose 40% Gel 15 Gram(s) Oral once PRN Blood Glucose LESS THAN 70 milliGRAM(s)/deciliter  glucagon  Injectable 1 milliGRAM(s) IntraMuscular once PRN Glucose LESS THAN 70 milligrams/deciliter      Allergies    Isordil (Headache)  penicillin (Rash)    Intolerances        Review of Systems:    General:  No wt loss, fevers, chills, night sweats, fatigue   Eyes:  Good vision, no reported pain  ENT:  No sore throat, pain, runny nose, dysphagia  CV:  No pain, palpitations, hypo/hypertension  Resp:  No dyspnea, cough, tachypnea, wheezing  GI:  No pain, No nausea, No vomiting, No diarrhea, No constipation, No weight loss, No fever, No pruritis, No rectal bleeding, No melena, No dysphagia  :  No pain, bleeding, incontinence, nocturia  Muscle:  No pain, weakness  Neuro:  No weakness, tingling, memory problems  Psych:  No fatigue, insomnia, mood problems, depression  Endocrine:  No polyuria, polydypsia, cold/heat intolerance  Heme:  No petechiae, ecchymosis, easy bruisability  Skin:  No rash, tattoos, scars, edema      Vital Signs Last 24 Hrs  T(C): 36.6 (24 Aug 2018 09:51), Max: 36.7 (23 Aug 2018 20:12)  T(F): 97.8 (24 Aug 2018 09:51), Max: 98 (23 Aug 2018 20:12)  HR: 71 (24 Aug 2018 09:51) (71 - 88)  BP: 102/66 (24 Aug 2018 09:51) (99/68 - 110/74)  BP(mean): --  RR: 18 (24 Aug 2018 09:51) (17 - 18)  SpO2: 100% (24 Aug 2018 09:51) (100% - 100%)    PHYSICAL EXAM:    Constitutional: NAD  HEENT: EOMI, throat clear; +Icterus  Neck: No LAD, supple  Respiratory: CTA and P  Cardiovascular: S1 and S2, RRR, no M  Gastrointestinal: BS+, soft, NT/ND, neg HSM,  Extremities: No peripheral edema, neg clubbing, cyanosis  Vascular: 2+ peripheral pulses  Neurological: A/O x 3, no focal deficits  Psychiatric: Normal mood, normal affect  Skin: No rashes      LABS:                        12.1   7.03  )-----------( 191      ( 24 Aug 2018 07:00 )             35.9     08-24    128<L>  |  82<L>  |  35<H>  ----------------------------<  128<H>  3.5   |  29  |  1.13    Ca    7.3<L>      24 Aug 2018 07:00  Mg     1.8     08-24    TPro  7.2  /  Alb  3.4  /  TBili  10.0<H>  /  DBili  6.9<H>  /  AST  68<H>  /  ALT  36<H>  /  AlkPhos  424<H>  08-24          RADIOLOGY & ADDITIONAL TESTS:

## 2018-08-25 NOTE — PROGRESS NOTE ADULT - SUBJECTIVE AND OBJECTIVE BOX
Subjective: no chest pain.  dyspnea improved  	  MEDICATIONS:  MEDICATIONS  (STANDING):  aspirin enteric coated 81 milliGRAM(s) Oral daily  calcitriol   Capsule 0.5 MICROGram(s) Oral two times a day  calcium carbonate    500 mG (Tums) Chewable 2 Tablet(s) Chew three times a day  cholecalciferol 1000 Unit(s) Oral daily  dextrose 5%. 1000 milliLiter(s) (50 mL/Hr) IV Continuous <Continuous>  dextrose 50% Injectable 12.5 Gram(s) IV Push once  dextrose 50% Injectable 25 Gram(s) IV Push once  dextrose 50% Injectable 25 Gram(s) IV Push once  furosemide   Injectable 80 milliGRAM(s) IV Push two times a day  heparin  Injectable 5000 Unit(s) SubCutaneous every 12 hours  insulin lispro (HumaLOG) corrective regimen sliding scale   SubCutaneous three times a day before meals  insulin lispro (HumaLOG) corrective regimen sliding scale   SubCutaneous at bedtime  levothyroxine 175 MICROGram(s) Oral daily  magnesium oxide 400 milliGRAM(s) Oral three times a day with meals  metoprolol succinate ER 25 milliGRAM(s) Oral daily  pantoprazole    Tablet 40 milliGRAM(s) Oral before breakfast  potassium chloride   Powder 40 milliEquivalent(s) Oral daily      LABS:	 	    CARDIAC MARKERS:  CARDIAC MARKERS ( 24 Aug 2018 07:00 )  x     / x     / 552 u/L / x     / x      CARDIAC MARKERS ( 23 Aug 2018 05:45 )  x     / x     / 773 u/L / x     / x                                    11.7   7.86  )-----------( 164      ( 25 Aug 2018 06:20 )             35.2     Hemoglobin: 11.7 g/dL (08-25 @ 06:20)  Hemoglobin: 12.1 g/dL (08-24 @ 07:00)  Hemoglobin: 12.4 g/dL (08-23 @ 05:45)  Hemoglobin: 11.9 g/dL (08-22 @ 06:00)  Hemoglobin: 12.1 g/dL (08-21 @ 07:47)      08-24    128<L>  |  82<L>  |  35<H>  ----------------------------<  128<H>  3.5   |  29  |  1.13    Ca    7.3<L>      24 Aug 2018 07:00  Mg     1.8     08-24    TPro  7.2  /  Alb  3.4  /  TBili  10.0<H>  /  DBili  6.9<H>  /  AST  68<H>  /  ALT  36<H>  /  AlkPhos  424<H>  08-24    Creatinine Trend: 1.13<--, 1.27<--, 1.34<--, 1.51<--, 1.78<--        PHYSICAL EXAM:  T(C): 36.3 (08-25-18 @ 11:26), Max: 36.4 (08-25-18 @ 05:51)  HR: 80 (08-25-18 @ 17:04) (80 - 88)  BP: 105/76 (08-25-18 @ 17:04) (105/76 - 114/75)  RR: 17 (08-25-18 @ 17:04) (15 - 17)  SpO2: 98% (08-25-18 @ 17:04) (98% - 100%)  Wt(kg): --  I&O's Summary    24 Aug 2018 07:01  -  25 Aug 2018 07:00  --------------------------------------------------------  IN: 250 mL / OUT: 2275 mL / NET: -2025 mL    25 Aug 2018 07:01  -  25 Aug 2018 21:22  --------------------------------------------------------  IN: 0 mL / OUT: 2700 mL / NET: -2700 mL          HEENT:   Normal oral mucosa, PERRL, EOMI	  Lymphatic: No obvious lymphadenopathy , no edema  Cardiovascular: Normal S1 S2, No JVD, 1/6 ROSALINDA murmur, Peripheral pulses palpable 2+ bilaterally  Respiratory: Lungs clear to auscultation, normal effort 	  Gastrointestinal:  Soft, Non-tender, + BS	  Skin: No rashes,  No cyanosis, warm to touch  Musculoskeletal: Normal range of motion, normal strength  Psychiatry:  Appropriate Mood & affect   ext: + edema b/l    TELEMETRY: 	          ASSESSMENT/PLAN: 	62y Female with pmhx of NICM/known hx of systolic heart failure with EF 10-15% now with fluid overload  1) patient with recent cath with no significant CAD  2) no need for repeat ischemic evaluation  3) aggressive diuresis

## 2018-08-26 LAB
ALBUMIN SERPL ELPH-MCNC: 3.3 G/DL — SIGNIFICANT CHANGE UP (ref 3.3–5)
ALP SERPL-CCNC: 388 U/L — HIGH (ref 40–120)
ALT FLD-CCNC: 41 U/L — HIGH (ref 4–33)
AST SERPL-CCNC: 78 U/L — HIGH (ref 4–32)
BASOPHILS # BLD AUTO: 0.09 K/UL — SIGNIFICANT CHANGE UP (ref 0–0.2)
BASOPHILS NFR BLD AUTO: 1.3 % — SIGNIFICANT CHANGE UP (ref 0–2)
BILIRUB DIRECT SERPL-MCNC: 5.1 MG/DL — HIGH (ref 0.1–0.2)
BILIRUB SERPL-MCNC: 7.4 MG/DL — HIGH (ref 0.2–1.2)
BUN SERPL-MCNC: 28 MG/DL — HIGH (ref 7–23)
BUN SERPL-MCNC: 28 MG/DL — HIGH (ref 7–23)
CALCIUM SERPL-MCNC: 7.4 MG/DL — LOW (ref 8.4–10.5)
CALCIUM SERPL-MCNC: 7.5 MG/DL — LOW (ref 8.4–10.5)
CHLORIDE SERPL-SCNC: 84 MMOL/L — LOW (ref 98–107)
CHLORIDE SERPL-SCNC: 85 MMOL/L — LOW (ref 98–107)
CK SERPL-CCNC: 323 U/L — HIGH (ref 25–170)
CO2 SERPL-SCNC: 29 MMOL/L — SIGNIFICANT CHANGE UP (ref 22–31)
CO2 SERPL-SCNC: 32 MMOL/L — HIGH (ref 22–31)
CREAT SERPL-MCNC: 1.14 MG/DL — SIGNIFICANT CHANGE UP (ref 0.5–1.3)
CREAT SERPL-MCNC: 1.16 MG/DL — SIGNIFICANT CHANGE UP (ref 0.5–1.3)
EOSINOPHIL # BLD AUTO: 0.06 K/UL — SIGNIFICANT CHANGE UP (ref 0–0.5)
EOSINOPHIL NFR BLD AUTO: 0.9 % — SIGNIFICANT CHANGE UP (ref 0–6)
GLUCOSE SERPL-MCNC: 140 MG/DL — HIGH (ref 70–99)
GLUCOSE SERPL-MCNC: 197 MG/DL — HIGH (ref 70–99)
HCT VFR BLD CALC: 34.7 % — SIGNIFICANT CHANGE UP (ref 34.5–45)
HGB BLD-MCNC: 11.6 G/DL — SIGNIFICANT CHANGE UP (ref 11.5–15.5)
IMM GRANULOCYTES # BLD AUTO: 0.04 # — SIGNIFICANT CHANGE UP
IMM GRANULOCYTES NFR BLD AUTO: 0.6 % — SIGNIFICANT CHANGE UP (ref 0–1.5)
LYMPHOCYTES # BLD AUTO: 1.18 K/UL — SIGNIFICANT CHANGE UP (ref 1–3.3)
LYMPHOCYTES # BLD AUTO: 17.6 % — SIGNIFICANT CHANGE UP (ref 13–44)
MAGNESIUM SERPL-MCNC: 1.8 MG/DL — SIGNIFICANT CHANGE UP (ref 1.6–2.6)
MCHC RBC-ENTMCNC: 23.5 PG — LOW (ref 27–34)
MCHC RBC-ENTMCNC: 33.4 % — SIGNIFICANT CHANGE UP (ref 32–36)
MCV RBC AUTO: 70.4 FL — LOW (ref 80–100)
MONOCYTES # BLD AUTO: 0.7 K/UL — SIGNIFICANT CHANGE UP (ref 0–0.9)
MONOCYTES NFR BLD AUTO: 10.5 % — SIGNIFICANT CHANGE UP (ref 2–14)
NEUTROPHILS # BLD AUTO: 4.62 K/UL — SIGNIFICANT CHANGE UP (ref 1.8–7.4)
NEUTROPHILS NFR BLD AUTO: 69.1 % — SIGNIFICANT CHANGE UP (ref 43–77)
NRBC # FLD: 0 — SIGNIFICANT CHANGE UP
PLATELET # BLD AUTO: 172 K/UL — SIGNIFICANT CHANGE UP (ref 150–400)
PMV BLD: SIGNIFICANT CHANGE UP FL (ref 7–13)
POTASSIUM SERPL-MCNC: 2.7 MMOL/L — CRITICAL LOW (ref 3.5–5.3)
POTASSIUM SERPL-MCNC: 3.4 MMOL/L — LOW (ref 3.5–5.3)
POTASSIUM SERPL-SCNC: 2.7 MMOL/L — CRITICAL LOW (ref 3.5–5.3)
POTASSIUM SERPL-SCNC: 3.4 MMOL/L — LOW (ref 3.5–5.3)
PROT SERPL-MCNC: 7 G/DL — SIGNIFICANT CHANGE UP (ref 6–8.3)
RBC # BLD: 4.93 M/UL — SIGNIFICANT CHANGE UP (ref 3.8–5.2)
RBC # FLD: 23 % — HIGH (ref 10.3–14.5)
SODIUM SERPL-SCNC: 133 MMOL/L — LOW (ref 135–145)
SODIUM SERPL-SCNC: 134 MMOL/L — LOW (ref 135–145)
WBC # BLD: 6.69 K/UL — SIGNIFICANT CHANGE UP (ref 3.8–10.5)
WBC # FLD AUTO: 6.69 K/UL — SIGNIFICANT CHANGE UP (ref 3.8–10.5)

## 2018-08-26 RX ORDER — POTASSIUM CHLORIDE 20 MEQ
40 PACKET (EA) ORAL EVERY 4 HOURS
Qty: 0 | Refills: 0 | Status: COMPLETED | OUTPATIENT
Start: 2018-08-26 | End: 2018-08-27

## 2018-08-26 RX ORDER — FUROSEMIDE 40 MG
80 TABLET ORAL
Qty: 0 | Refills: 0 | Status: DISCONTINUED | OUTPATIENT
Start: 2018-08-26 | End: 2018-08-27

## 2018-08-26 RX ORDER — POTASSIUM CHLORIDE 20 MEQ
40 PACKET (EA) ORAL EVERY 4 HOURS
Qty: 0 | Refills: 0 | Status: COMPLETED | OUTPATIENT
Start: 2018-08-26 | End: 2018-08-26

## 2018-08-26 RX ORDER — POTASSIUM CHLORIDE 20 MEQ
10 PACKET (EA) ORAL
Qty: 0 | Refills: 0 | Status: COMPLETED | OUTPATIENT
Start: 2018-08-26 | End: 2018-08-26

## 2018-08-26 RX ADMIN — CALCITRIOL 0.5 MICROGRAM(S): 0.5 CAPSULE ORAL at 18:41

## 2018-08-26 RX ADMIN — CALCITRIOL 0.5 MICROGRAM(S): 0.5 CAPSULE ORAL at 06:15

## 2018-08-26 RX ADMIN — Medication 40 MILLIEQUIVALENT(S): at 11:36

## 2018-08-26 RX ADMIN — Medication 2 TABLET(S): at 12:38

## 2018-08-26 RX ADMIN — Medication 80 MILLIGRAM(S): at 18:40

## 2018-08-26 RX ADMIN — Medication 40 MILLIEQUIVALENT(S): at 08:16

## 2018-08-26 RX ADMIN — Medication 100 MILLIEQUIVALENT(S): at 08:15

## 2018-08-26 RX ADMIN — MAGNESIUM OXIDE 400 MG ORAL TABLET 400 MILLIGRAM(S): 241.3 TABLET ORAL at 11:36

## 2018-08-26 RX ADMIN — PANTOPRAZOLE SODIUM 40 MILLIGRAM(S): 20 TABLET, DELAYED RELEASE ORAL at 06:15

## 2018-08-26 RX ADMIN — Medication 175 MICROGRAM(S): at 06:15

## 2018-08-26 RX ADMIN — HEPARIN SODIUM 5000 UNIT(S): 5000 INJECTION INTRAVENOUS; SUBCUTANEOUS at 18:41

## 2018-08-26 RX ADMIN — Medication 40 MILLIEQUIVALENT(S): at 18:40

## 2018-08-26 RX ADMIN — Medication 100 MILLIEQUIVALENT(S): at 10:22

## 2018-08-26 RX ADMIN — MAGNESIUM OXIDE 400 MG ORAL TABLET 400 MILLIGRAM(S): 241.3 TABLET ORAL at 18:41

## 2018-08-26 RX ADMIN — MAGNESIUM OXIDE 400 MG ORAL TABLET 400 MILLIGRAM(S): 241.3 TABLET ORAL at 08:17

## 2018-08-26 RX ADMIN — Medication 81 MILLIGRAM(S): at 11:36

## 2018-08-26 RX ADMIN — Medication 40 MILLIEQUIVALENT(S): at 12:37

## 2018-08-26 RX ADMIN — Medication 1000 UNIT(S): at 11:36

## 2018-08-26 RX ADMIN — Medication 80 MILLIGRAM(S): at 06:16

## 2018-08-26 RX ADMIN — Medication 25 MILLIGRAM(S): at 11:36

## 2018-08-26 RX ADMIN — Medication 2: at 12:36

## 2018-08-26 RX ADMIN — HEPARIN SODIUM 5000 UNIT(S): 5000 INJECTION INTRAVENOUS; SUBCUTANEOUS at 06:15

## 2018-08-26 RX ADMIN — Medication 2 TABLET(S): at 06:15

## 2018-08-26 RX ADMIN — Medication 2 TABLET(S): at 22:27

## 2018-08-26 NOTE — PROGRESS NOTE ADULT - SUBJECTIVE AND OBJECTIVE BOX
Subjective: no chest pain.  dyspnea improved  	    aspirin enteric coated 81 milliGRAM(s) Oral daily  calcitriol   Capsule 0.5 MICROGram(s) Oral two times a day  calcium carbonate    500 mG (Tums) Chewable 2 Tablet(s) Chew three times a day  cholecalciferol 1000 Unit(s) Oral daily  dextrose 40% Gel 15 Gram(s) Oral once PRN  dextrose 5%. 1000 milliLiter(s) IV Continuous <Continuous>  dextrose 50% Injectable 12.5 Gram(s) IV Push once  dextrose 50% Injectable 25 Gram(s) IV Push once  dextrose 50% Injectable 25 Gram(s) IV Push once  furosemide    Tablet 80 milliGRAM(s) Oral two times a day  glucagon  Injectable 1 milliGRAM(s) IntraMuscular once PRN  heparin  Injectable 5000 Unit(s) SubCutaneous every 12 hours  insulin lispro (HumaLOG) corrective regimen sliding scale   SubCutaneous three times a day before meals  insulin lispro (HumaLOG) corrective regimen sliding scale   SubCutaneous at bedtime  levothyroxine 175 MICROGram(s) Oral daily  magnesium oxide 400 milliGRAM(s) Oral three times a day with meals  metoprolol succinate ER 25 milliGRAM(s) Oral daily  pantoprazole    Tablet 40 milliGRAM(s) Oral before breakfast  potassium chloride   Powder 40 milliEquivalent(s) Oral daily                            11.6   6.69  )-----------( 172      ( 26 Aug 2018 06:31 )             34.7       08-26    134<L>  |  84<L>  |  28<H>  ----------------------------<  140<H>  2.7<LL>   |  32<H>  |  1.14    Ca    7.5<L>      26 Aug 2018 06:31  Mg     1.8     08-26    TPro  7.0  /  Alb  3.3  /  TBili  7.4<H>  /  DBili  5.1<H>  /  AST  78<H>  /  ALT  41<H>  /  AlkPhos  388<H>  08-26      CARDIAC MARKERS ( 26 Aug 2018 06:31 )  x     / x     / 323 u/L / x     / x      CARDIAC MARKERS ( 24 Aug 2018 07:00 )  x     / x     / 552 u/L / x     / x            T(C): 36.7 (08-26-18 @ 12:11), Max: 36.7 (08-26-18 @ 12:11)  HR: 78 (08-26-18 @ 12:11) (78 - 81)  BP: 114/78 (08-26-18 @ 12:11) (114/78 - 116/85)  RR: 18 (08-26-18 @ 12:11) (18 - 18)  SpO2: 99% (08-26-18 @ 12:11) (99% - 100%)  Wt(kg): --    I&O's Summary    25 Aug 2018 07:01  -  26 Aug 2018 07:00  --------------------------------------------------------  IN: 0 mL / OUT: 2700 mL / NET: -2700 mL    26 Aug 2018 07:01  -  26 Aug 2018 19:52  --------------------------------------------------------  IN: 0 mL / OUT: 2125 mL / NET: -2125 mL            HEENT:   Normal oral mucosa, PERRL, EOMI	  Lymphatic: No obvious lymphadenopathy , no edema  Cardiovascular: Normal S1 S2, No JVD, 1/6 ROSALINDA murmur, Peripheral pulses palpable 2+ bilaterally  Respiratory: Lungs clear to auscultation, normal effort 	  Gastrointestinal:  Soft, Non-tender, + BS	  Skin: No rashes,  No cyanosis, warm to touch  Musculoskeletal: Normal range of motion, normal strength  Psychiatry:  Appropriate Mood & affect   ext: + edema b/l    TELEMETRY: 	          ASSESSMENT/PLAN: 	62y Female with pmhx of NICM/known hx of systolic heart failure with EF 10-15% now with fluid overload  1) patient with recent cath with no significant CAD  2) no need for repeat ischemic evaluation  3) continue with IV diuresis  4) replete lytes - repeat chem-7 after repletion  5) will check repeat tte after diuresis to assess MR Kunal Muller MD

## 2018-08-26 NOTE — PROGRESS NOTE ADULT - SUBJECTIVE AND OBJECTIVE BOX
INTERVAL HPI/OVERNIGHT EVENTS: I feel much better so want to go home tomorrow.   Vital Signs Last 24 Hrs  T(C): 36.6 (26 Aug 2018 06:13), Max: 36.6 (26 Aug 2018 06:13)  T(F): 97.8 (26 Aug 2018 06:13), Max: 97.8 (26 Aug 2018 06:13)  HR: 81 (26 Aug 2018 06:13) (80 - 81)  BP: 116/85 (26 Aug 2018 06:13) (105/76 - 116/85)  BP(mean): --  RR: 18 (26 Aug 2018 06:13) (17 - 18)  SpO2: 100% (26 Aug 2018 06:13) (98% - 100%)  I&O's Summary    25 Aug 2018 07:01  -  26 Aug 2018 07:00  --------------------------------------------------------  IN: 0 mL / OUT: 2700 mL / NET: -2700 mL    26 Aug 2018 07:01  -  26 Aug 2018 11:44  --------------------------------------------------------  IN: 0 mL / OUT: 1100 mL / NET: -1100 mL      MEDICATIONS  (STANDING):  aspirin enteric coated 81 milliGRAM(s) Oral daily  calcitriol   Capsule 0.5 MICROGram(s) Oral two times a day  calcium carbonate    500 mG (Tums) Chewable 2 Tablet(s) Chew three times a day  cholecalciferol 1000 Unit(s) Oral daily  dextrose 5%. 1000 milliLiter(s) (50 mL/Hr) IV Continuous <Continuous>  dextrose 50% Injectable 12.5 Gram(s) IV Push once  dextrose 50% Injectable 25 Gram(s) IV Push once  dextrose 50% Injectable 25 Gram(s) IV Push once  furosemide    Tablet 80 milliGRAM(s) Oral two times a day  heparin  Injectable 5000 Unit(s) SubCutaneous every 12 hours  insulin lispro (HumaLOG) corrective regimen sliding scale   SubCutaneous three times a day before meals  insulin lispro (HumaLOG) corrective regimen sliding scale   SubCutaneous at bedtime  levothyroxine 175 MICROGram(s) Oral daily  magnesium oxide 400 milliGRAM(s) Oral three times a day with meals  metoprolol succinate ER 25 milliGRAM(s) Oral daily  pantoprazole    Tablet 40 milliGRAM(s) Oral before breakfast  potassium chloride    Tablet ER 40 milliEquivalent(s) Oral every 4 hours  potassium chloride   Powder 40 milliEquivalent(s) Oral daily  potassium chloride  10 mEq/100 mL IVPB 10 milliEquivalent(s) IV Intermittent every 1 hour    MEDICATIONS  (PRN):  dextrose 40% Gel 15 Gram(s) Oral once PRN Blood Glucose LESS THAN 70 milliGRAM(s)/deciliter  glucagon  Injectable 1 milliGRAM(s) IntraMuscular once PRN Glucose LESS THAN 70 milligrams/deciliter    LABS:                        11.6   6.69  )-----------( 172      ( 26 Aug 2018 06:31 )             34.7     08-26    134<L>  |  84<L>  |  28<H>  ----------------------------<  140<H>  2.7<LL>   |  32<H>  |  1.14    Ca    7.5<L>      26 Aug 2018 06:31  Mg     1.8     08-26    TPro  7.0  /  Alb  3.3  /  TBili  7.4<H>  /  DBili  5.1<H>  /  AST  78<H>  /  ALT  41<H>  /  AlkPhos  388<H>  08-26        CAPILLARY BLOOD GLUCOSE      POCT Blood Glucose.: 94 mg/dL (26 Aug 2018 08:21)  POCT Blood Glucose.: 150 mg/dL (25 Aug 2018 21:34)  POCT Blood Glucose.: 169 mg/dL (25 Aug 2018 17:05)  POCT Blood Glucose.: 181 mg/dL (25 Aug 2018 12:02)          REVIEW OF SYSTEMS:  CONSTITUTIONAL: No fever, weight loss, or fatigue  EYES: No eye pain, visual disturbances, or discharge  ENMT:  No difficulty hearing, tinnitus, vertigo; No sinus or throat pain  NECK: No pain or stiffness  BREASTS: No pain, masses, or nipple discharge  RESPIRATORY: No cough, wheezing, chills or hemoptysis; No shortness of breath  CARDIOVASCULAR: No chest pain, palpitations, dizziness, or leg swelling  GASTROINTESTINAL: No abdominal or epigastric pain. No nausea, vomiting, or hematemesis; No diarrhea or constipation. No melena or hematochezia.  GENITOURINARY: No dysuria, frequency, hematuria, or incontinence  NEUROLOGICAL: No headaches, memory loss, loss of strength, numbness, or tremors      Consultant(s) Notes Reviewed:  [x ] YES  [ ] NO    PHYSICAL EXAM:  GENERAL: NAD, well-groomed, well-developed, not in any distress ,  HEAD:  Atraumatic, Normocephalic  EYES: EOMI, PERRLA, conjunctiva and sclera clear  ENMT: No tonsillar erythema, exudates, or enlargement; Moist mucous membranes, Good dentition, No lesions  NECK: Supple, JVD less   NERVOUS SYSTEM:  Alert & Oriented X3, No focal deficit   CHEST/LUNG: Good air entry bilateral with no  rales, rhonchi, wheezing, or rubs  HEART: Regular rate and rhythm; No murmurs, rubs, or gallops  ABDOMEN: Soft, Nontender, Nondistended; Bowel sounds present  EXTREMITIES:  2+ Peripheral Pulses, No clubbing, cyanosis, but less  edema  SKIN: No rashes or lesions    Care Discussed with Consultants/Other Providers [ x] YES  [ ] NO

## 2018-08-26 NOTE — PROGRESS NOTE ADULT - SUBJECTIVE AND OBJECTIVE BOX
EP    tele: NSR, BiV-pacing    less dyspnea, no palpitations, no syncope, + jaundice        MEDICATIONS  (STANDING):  aspirin enteric coated 81 milliGRAM(s) Oral daily  calcitriol   Capsule 0.5 MICROGram(s) Oral two times a day  calcium carbonate    500 mG (Tums) Chewable 2 Tablet(s) Chew three times a day  cholecalciferol 1000 Unit(s) Oral daily  dextrose 5%. 1000 milliLiter(s) (50 mL/Hr) IV Continuous <Continuous>  dextrose 50% Injectable 12.5 Gram(s) IV Push once  dextrose 50% Injectable 25 Gram(s) IV Push once  dextrose 50% Injectable 25 Gram(s) IV Push once  furosemide   Injectable 80 milliGRAM(s) IV Push two times a day  heparin  Injectable 5000 Unit(s) SubCutaneous every 12 hours  insulin lispro (HumaLOG) corrective regimen sliding scale   SubCutaneous three times a day before meals  insulin lispro (HumaLOG) corrective regimen sliding scale   SubCutaneous at bedtime  levothyroxine 175 MICROGram(s) Oral daily  magnesium oxide 400 milliGRAM(s) Oral three times a day with meals  metoprolol succinate ER 25 milliGRAM(s) Oral daily  pantoprazole    Tablet 40 milliGRAM(s) Oral before breakfast  potassium chloride    Tablet ER 40 milliEquivalent(s) Oral every 4 hours  potassium chloride   Powder 40 milliEquivalent(s) Oral daily  potassium chloride  10 mEq/100 mL IVPB 10 milliEquivalent(s) IV Intermittent every 1 hour    MEDICATIONS  (PRN):  dextrose 40% Gel 15 Gram(s) Oral once PRN Blood Glucose LESS THAN 70 milliGRAM(s)/deciliter  glucagon  Injectable 1 milliGRAM(s) IntraMuscular once PRN Glucose LESS THAN 70 milligrams/deciliter      LABS:                        11.6   6.69  )-----------( 172      ( 26 Aug 2018 06:31 )             34.7     Hemoglobin: 11.6 g/dL (08-26 @ 06:31)  Hemoglobin: 11.7 g/dL (08-25 @ 06:20)  Hemoglobin: 12.1 g/dL (08-24 @ 07:00)  Hemoglobin: 12.4 g/dL (08-23 @ 05:45)  Hemoglobin: 11.9 g/dL (08-22 @ 06:00)    08-26    134<L>  |  84<L>  |  28<H>  ----------------------------<  140<H>  2.7<LL>   |  32<H>  |  1.14    Ca    7.5<L>      26 Aug 2018 06:31  Mg     1.8     08-26    TPro  7.0  /  Alb  3.3  /  TBili  7.4<H>  /  DBili  5.1<H>  /  AST  78<H>  /  ALT  41<H>  /  AlkPhos  388<H>  08-26    Creatinine Trend: 1.14<--, 1.13<--, 1.27<--, 1.34<--, 1.51<--, 1.78<--     CARDIAC MARKERS ( 26 Aug 2018 06:31 )  x     / x     / 323 u/L / x     / x      CARDIAC MARKERS ( 24 Aug 2018 07:00 )  x     / x     / 552 u/L / x     / x            PHYSICAL EXAM  Vital Signs Last 24 Hrs  T(C): 36.6 (26 Aug 2018 06:13), Max: 36.6 (26 Aug 2018 06:13)  T(F): 97.8 (26 Aug 2018 06:13), Max: 97.8 (26 Aug 2018 06:13)  HR: 81 (26 Aug 2018 06:13) (80 - 84)  BP: 116/85 (26 Aug 2018 06:13) (105/76 - 116/85)  BP(mean): --  RR: 18 (26 Aug 2018 06:13) (17 - 18)  SpO2: 100% (26 Aug 2018 06:13) (98% - 100%)      JVP 12  RRR, no murmurs  decreased BS  soft nt/nd  + 1 edema    ASSESSMENT/PLAN: She is a pleasant 61 y/o female PMH severe NICM (LVEF 10-15%) who had a Medtronic Biv-ICD implanted at [place], hx DM, HTN, thyroid ca s/p thyroidectomy, recent LHC was also unremarkable.  She was hospitalized last month for decompensated CHF requiring ionotrope assisted diuresis.  ICD interrogation at that time with no events.  She comes to ED with c/o 10 lb weight gain in 1 week, LE edema, orthopnea, c/w acute on chronic systolic HF exacerbation, also with hypocalcemia, MAGNUS, elevated LFTs and elevated CPK.      -IV diuresis  -Renal Eval appreciated - f/  -GI eval appreciated - possible   -BV-ICD has been optimized. Management of CHF as per cardiology  -hypokalemia - d/w renal - supplementation ordered - will need daily K repletion                       - repeat K s/p supplementation EP    tele: NSR, BiV-pacing    less dyspnea, no palpitations, no syncope, + jaundice        MEDICATIONS  (STANDING):  aspirin enteric coated 81 milliGRAM(s) Oral daily  calcitriol   Capsule 0.5 MICROGram(s) Oral two times a day  calcium carbonate    500 mG (Tums) Chewable 2 Tablet(s) Chew three times a day  cholecalciferol 1000 Unit(s) Oral daily  dextrose 5%. 1000 milliLiter(s) (50 mL/Hr) IV Continuous <Continuous>  dextrose 50% Injectable 12.5 Gram(s) IV Push once  dextrose 50% Injectable 25 Gram(s) IV Push once  dextrose 50% Injectable 25 Gram(s) IV Push once  furosemide   Injectable 80 milliGRAM(s) IV Push two times a day  heparin  Injectable 5000 Unit(s) SubCutaneous every 12 hours  insulin lispro (HumaLOG) corrective regimen sliding scale   SubCutaneous three times a day before meals  insulin lispro (HumaLOG) corrective regimen sliding scale   SubCutaneous at bedtime  levothyroxine 175 MICROGram(s) Oral daily  magnesium oxide 400 milliGRAM(s) Oral three times a day with meals  metoprolol succinate ER 25 milliGRAM(s) Oral daily  pantoprazole    Tablet 40 milliGRAM(s) Oral before breakfast  potassium chloride    Tablet ER 40 milliEquivalent(s) Oral every 4 hours  potassium chloride   Powder 40 milliEquivalent(s) Oral daily  potassium chloride  10 mEq/100 mL IVPB 10 milliEquivalent(s) IV Intermittent every 1 hour    MEDICATIONS  (PRN):  dextrose 40% Gel 15 Gram(s) Oral once PRN Blood Glucose LESS THAN 70 milliGRAM(s)/deciliter  glucagon  Injectable 1 milliGRAM(s) IntraMuscular once PRN Glucose LESS THAN 70 milligrams/deciliter      LABS:                        11.6   6.69  )-----------( 172      ( 26 Aug 2018 06:31 )             34.7     Hemoglobin: 11.6 g/dL (08-26 @ 06:31)  Hemoglobin: 11.7 g/dL (08-25 @ 06:20)  Hemoglobin: 12.1 g/dL (08-24 @ 07:00)  Hemoglobin: 12.4 g/dL (08-23 @ 05:45)  Hemoglobin: 11.9 g/dL (08-22 @ 06:00)    08-26    134<L>  |  84<L>  |  28<H>  ----------------------------<  140<H>  2.7<LL>   |  32<H>  |  1.14    Ca    7.5<L>      26 Aug 2018 06:31  Mg     1.8     08-26    TPro  7.0  /  Alb  3.3  /  TBili  7.4<H>  /  DBili  5.1<H>  /  AST  78<H>  /  ALT  41<H>  /  AlkPhos  388<H>  08-26    Creatinine Trend: 1.14<--, 1.13<--, 1.27<--, 1.34<--, 1.51<--, 1.78<--     CARDIAC MARKERS ( 26 Aug 2018 06:31 )  x     / x     / 323 u/L / x     / x      CARDIAC MARKERS ( 24 Aug 2018 07:00 )  x     / x     / 552 u/L / x     / x            PHYSICAL EXAM  Vital Signs Last 24 Hrs  T(C): 36.6 (26 Aug 2018 06:13), Max: 36.6 (26 Aug 2018 06:13)  T(F): 97.8 (26 Aug 2018 06:13), Max: 97.8 (26 Aug 2018 06:13)  HR: 81 (26 Aug 2018 06:13) (80 - 84)  BP: 116/85 (26 Aug 2018 06:13) (105/76 - 116/85)  BP(mean): --  RR: 18 (26 Aug 2018 06:13) (17 - 18)  SpO2: 100% (26 Aug 2018 06:13) (98% - 100%)      JVP 12  RRR, no murmurs  decreased BS  soft nt/nd  + 1 edema    ASSESSMENT/PLAN: She is a pleasant 61 y/o female PMH severe NICM (LVEF 10-15%) who had a Medtronic Biv-ICD implanted at [place], hx DM, HTN, thyroid ca s/p thyroidectomy, recent LHC was also unremarkable.  She was hospitalized last month for decompensated CHF requiring ionotrope assisted diuresis.  ICD interrogation at that time with no events.  She comes to ED with c/o 10 lb weight gain in 1 week, LE edema, orthopnea, c/w acute on chronic systolic HF exacerbation, also with hypocalcemia, MAGNUS, elevated LFTs and elevated CPK.      -d/w Renal - transition to Lasix 80mg PO BID  with KCL 40meq po daily for K repletion in AM  (8/27)  -GI eval appreciated - possible IR transjugular liver biopsy with pressures   -BV-ICD has been optimized. Management of CHF as per cardiology  -hypokalemia - d/w renal - supplementation ordered                       - repeat K level s/p supplementation   - care per primary team

## 2018-08-26 NOTE — PROGRESS NOTE ADULT - SUBJECTIVE AND OBJECTIVE BOX
Patient seen and examined in bed; no new events.  NAD.    REVIEW OF SYSTEMS:  As per HPI, otherwise 8 full 10 ROS were unremarkable    MEDICATIONS  (STANDING):  aspirin enteric coated 81 milliGRAM(s) Oral daily  calcitriol   Capsule 0.5 MICROGram(s) Oral two times a day  calcium carbonate    500 mG (Tums) Chewable 2 Tablet(s) Chew three times a day  cholecalciferol 1000 Unit(s) Oral daily  dextrose 5%. 1000 milliLiter(s) (50 mL/Hr) IV Continuous <Continuous>  dextrose 50% Injectable 12.5 Gram(s) IV Push once  dextrose 50% Injectable 25 Gram(s) IV Push once  dextrose 50% Injectable 25 Gram(s) IV Push once  furosemide    Tablet 80 milliGRAM(s) Oral two times a day  heparin  Injectable 5000 Unit(s) SubCutaneous every 12 hours  insulin lispro (HumaLOG) corrective regimen sliding scale   SubCutaneous three times a day before meals  insulin lispro (HumaLOG) corrective regimen sliding scale   SubCutaneous at bedtime  levothyroxine 175 MICROGram(s) Oral daily  magnesium oxide 400 milliGRAM(s) Oral three times a day with meals  metoprolol succinate ER 25 milliGRAM(s) Oral daily  pantoprazole    Tablet 40 milliGRAM(s) Oral before breakfast  potassium chloride    Tablet ER 40 milliEquivalent(s) Oral every 4 hours  potassium chloride   Powder 40 milliEquivalent(s) Oral daily      VITAL:  T(C): , Max: 36.7 (08-26-18 @ 12:11)  T(F): , Max: 98 (08-26-18 @ 12:11)  HR: 78 (08-26-18 @ 12:11)  BP: 114/78 (08-26-18 @ 12:11)  BP(mean): --  RR: 18 (08-26-18 @ 12:11)  SpO2: 99% (08-26-18 @ 12:11)  Wt(kg): --    I and O's:    08-25 @ 07:01  -  08-26 @ 07:00  --------------------------------------------------------  IN: 0 mL / OUT: 2700 mL / NET: -2700 mL    08-26 @ 07:01  -  08-26 @ 13:38  --------------------------------------------------------  IN: 0 mL / OUT: 1100 mL / NET: -1100 mL          PHYSICAL EXAM:    Constitutional: NAD, Alert  HEENT: NCAT, MMM; (+)scleral icterus  Neck: Supple, (+) JVD  Respiratory: CTA-b/l  Cardiovascular: RRR s1s2, no m/r/g  Gastrointestinal: BS+, soft, NT/ND  Extremities: 2+ b/l LE edema  Neurological: no focal deficits; strength grossly intact  Back: no CVAT b/l  Skin: No rashes, no nevi    LABS:                        11.6   6.69  )-----------( 172      ( 26 Aug 2018 06:31 )             34.7     08-26    134<L>  |  84<L>  |  28<H>  ----------------------------<  140<H>  2.7<LL>   |  32<H>  |  1.14    Ca    7.5<L>      26 Aug 2018 06:31  Mg     1.8     08-26    TPro  7.0  /  Alb  3.3  /  TBili  7.4<H>  /  DBili  5.1<H>  /  AST  78<H>  /  ALT  41<H>  /  AlkPhos  388<H>  08-26      IMPRESSION: 62F w/ HFrEF (10%)-AICD, DM2, HTN, thyroid CA-thyroidectomy, and hypoparathyroidism, 8/20/18 a/w acute on chronic HFrEF/MAGNUS    (1)Renal - MAGNUS - prerenally mediated in association with her decompensated CHF. Substantially improved over past few days, with diuresis; Resolved    (2)Hypocalcemia - hypoparathyroidism - Ca continues to trend upward, on combination of Tums, Calcitriol, and HCTZ.    (3)Hyponatremia - hypervolemic hyponatremia - improved with diuresis. Na+ 134 today    (4)Hypokalemia - diuretic-associated. Low today; ordered for repletion     (5)Liver - likely to require liver biopsy to rule out amyloid      RECOMMEND:  (1)Tums and Calcitriol, and HCTZ as ordered  (2)A/w Lasix 80mg po bid  (3)A/w KCL 40 meq PO x 3 dose  (4)Liver biopsy per GI  (5)BMP daily

## 2018-08-27 ENCOUNTER — TRANSCRIPTION ENCOUNTER (OUTPATIENT)
Age: 63
End: 2018-08-27

## 2018-08-27 VITALS — WEIGHT: 208.78 LBS

## 2018-08-27 DIAGNOSIS — I50.23 ACUTE ON CHRONIC SYSTOLIC (CONGESTIVE) HEART FAILURE: ICD-10-CM

## 2018-08-27 LAB
ALBUMIN SERPL ELPH-MCNC: 3.4 G/DL — SIGNIFICANT CHANGE UP (ref 3.3–5)
ALP SERPL-CCNC: 432 U/L — HIGH (ref 40–120)
ALT FLD-CCNC: 57 U/L — HIGH (ref 4–33)
AST SERPL-CCNC: 113 U/L — HIGH (ref 4–32)
BASOPHILS # BLD AUTO: 0.08 K/UL — SIGNIFICANT CHANGE UP (ref 0–0.2)
BASOPHILS NFR BLD AUTO: 1.3 % — SIGNIFICANT CHANGE UP (ref 0–2)
BILIRUB DIRECT SERPL-MCNC: 5.4 MG/DL — HIGH (ref 0.1–0.2)
BILIRUB SERPL-MCNC: 8.2 MG/DL — HIGH (ref 0.2–1.2)
BUN SERPL-MCNC: 28 MG/DL — HIGH (ref 7–23)
BUN SERPL-MCNC: 28 MG/DL — HIGH (ref 7–23)
CALCIUM SERPL-MCNC: 7.6 MG/DL — LOW (ref 8.4–10.5)
CALCIUM SERPL-MCNC: 7.6 MG/DL — LOW (ref 8.4–10.5)
CHLORIDE SERPL-SCNC: 86 MMOL/L — LOW (ref 98–107)
CHLORIDE SERPL-SCNC: 86 MMOL/L — LOW (ref 98–107)
CK SERPL-CCNC: 313 U/L — HIGH (ref 25–170)
CO2 SERPL-SCNC: 25 MMOL/L — SIGNIFICANT CHANGE UP (ref 22–31)
CO2 SERPL-SCNC: 25 MMOL/L — SIGNIFICANT CHANGE UP (ref 22–31)
CREAT SERPL-MCNC: 1.06 MG/DL — SIGNIFICANT CHANGE UP (ref 0.5–1.3)
CREAT SERPL-MCNC: 1.06 MG/DL — SIGNIFICANT CHANGE UP (ref 0.5–1.3)
EOSINOPHIL # BLD AUTO: 0.03 K/UL — SIGNIFICANT CHANGE UP (ref 0–0.5)
EOSINOPHIL NFR BLD AUTO: 0.5 % — SIGNIFICANT CHANGE UP (ref 0–6)
GLUCOSE SERPL-MCNC: 138 MG/DL — HIGH (ref 70–99)
GLUCOSE SERPL-MCNC: 138 MG/DL — HIGH (ref 70–99)
HCT VFR BLD CALC: 40.6 % — SIGNIFICANT CHANGE UP (ref 34.5–45)
HGB BLD-MCNC: 13.2 G/DL — SIGNIFICANT CHANGE UP (ref 11.5–15.5)
IMM GRANULOCYTES # BLD AUTO: 0.03 # — SIGNIFICANT CHANGE UP
IMM GRANULOCYTES NFR BLD AUTO: 0.5 % — SIGNIFICANT CHANGE UP (ref 0–1.5)
LYMPHOCYTES # BLD AUTO: 1.33 K/UL — SIGNIFICANT CHANGE UP (ref 1–3.3)
LYMPHOCYTES # BLD AUTO: 22.1 % — SIGNIFICANT CHANGE UP (ref 13–44)
MAGNESIUM SERPL-MCNC: 1.9 MG/DL — SIGNIFICANT CHANGE UP (ref 1.6–2.6)
MAGNESIUM SERPL-MCNC: 1.9 MG/DL — SIGNIFICANT CHANGE UP (ref 1.6–2.6)
MCHC RBC-ENTMCNC: 23.1 PG — LOW (ref 27–34)
MCHC RBC-ENTMCNC: 32.5 % — SIGNIFICANT CHANGE UP (ref 32–36)
MCV RBC AUTO: 71.1 FL — LOW (ref 80–100)
MITOCHONDRIA AB SER-ACNC: SIGNIFICANT CHANGE UP
MONOCYTES # BLD AUTO: 0.6 K/UL — SIGNIFICANT CHANGE UP (ref 0–0.9)
MONOCYTES NFR BLD AUTO: 10 % — SIGNIFICANT CHANGE UP (ref 2–14)
NEUTROPHILS # BLD AUTO: 3.96 K/UL — SIGNIFICANT CHANGE UP (ref 1.8–7.4)
NEUTROPHILS NFR BLD AUTO: 65.6 % — SIGNIFICANT CHANGE UP (ref 43–77)
NRBC # FLD: 0.02 — SIGNIFICANT CHANGE UP
PHOSPHATE SERPL-MCNC: 5.1 MG/DL — HIGH (ref 2.5–4.5)
PLATELET # BLD AUTO: 156 K/UL — SIGNIFICANT CHANGE UP (ref 150–400)
PMV BLD: SIGNIFICANT CHANGE UP FL (ref 7–13)
POTASSIUM SERPL-MCNC: 4.8 MMOL/L — SIGNIFICANT CHANGE UP (ref 3.5–5.3)
POTASSIUM SERPL-MCNC: 4.8 MMOL/L — SIGNIFICANT CHANGE UP (ref 3.5–5.3)
POTASSIUM SERPL-SCNC: 4.8 MMOL/L — SIGNIFICANT CHANGE UP (ref 3.5–5.3)
POTASSIUM SERPL-SCNC: 4.8 MMOL/L — SIGNIFICANT CHANGE UP (ref 3.5–5.3)
PROT SERPL-MCNC: 7.8 G/DL — SIGNIFICANT CHANGE UP (ref 6–8.3)
RBC # BLD: 5.71 M/UL — HIGH (ref 3.8–5.2)
RBC # FLD: 23.5 % — HIGH (ref 10.3–14.5)
SMOOTH MUSCLE AB SER-ACNC: SIGNIFICANT CHANGE UP
SODIUM SERPL-SCNC: 132 MMOL/L — LOW (ref 135–145)
SODIUM SERPL-SCNC: 132 MMOL/L — LOW (ref 135–145)
WBC # BLD: 6.03 K/UL — SIGNIFICANT CHANGE UP (ref 3.8–10.5)
WBC # FLD AUTO: 6.03 K/UL — SIGNIFICANT CHANGE UP (ref 3.8–10.5)

## 2018-08-27 PROCEDURE — 99233 SBSQ HOSP IP/OBS HIGH 50: CPT

## 2018-08-27 RX ORDER — HYDROCHLOROTHIAZIDE 25 MG
50 TABLET ORAL
Qty: 0 | Refills: 0 | Status: DISCONTINUED | OUTPATIENT
Start: 2018-08-27 | End: 2018-08-28

## 2018-08-27 RX ORDER — FUROSEMIDE 40 MG
80 TABLET ORAL
Qty: 0 | Refills: 0 | Status: DISCONTINUED | OUTPATIENT
Start: 2018-08-27 | End: 2018-08-27

## 2018-08-27 RX ORDER — PANTOPRAZOLE SODIUM 20 MG/1
1 TABLET, DELAYED RELEASE ORAL
Qty: 0 | Refills: 0 | COMMUNITY
Start: 2018-08-27

## 2018-08-27 RX ORDER — BUMETANIDE 0.25 MG/ML
1 INJECTION INTRAMUSCULAR; INTRAVENOUS
Qty: 20 | Refills: 0 | Status: DISCONTINUED | OUTPATIENT
Start: 2018-08-27 | End: 2018-08-27

## 2018-08-27 RX ORDER — LINAGLIPTIN 5 MG/1
1 TABLET, FILM COATED ORAL
Qty: 0 | Refills: 0 | COMMUNITY

## 2018-08-27 RX ADMIN — Medication 175 MICROGRAM(S): at 06:52

## 2018-08-27 RX ADMIN — Medication 2 TABLET(S): at 06:52

## 2018-08-27 RX ADMIN — Medication 40 MILLIEQUIVALENT(S): at 00:11

## 2018-08-27 RX ADMIN — Medication 2 TABLET(S): at 13:00

## 2018-08-27 RX ADMIN — Medication 80 MILLIGRAM(S): at 17:16

## 2018-08-27 RX ADMIN — CALCITRIOL 0.5 MICROGRAM(S): 0.5 CAPSULE ORAL at 06:52

## 2018-08-27 RX ADMIN — Medication 80 MILLIGRAM(S): at 06:52

## 2018-08-27 RX ADMIN — Medication 1: at 17:15

## 2018-08-27 RX ADMIN — Medication 1: at 12:52

## 2018-08-27 RX ADMIN — MAGNESIUM OXIDE 400 MG ORAL TABLET 400 MILLIGRAM(S): 241.3 TABLET ORAL at 17:15

## 2018-08-27 RX ADMIN — Medication 2 TABLET(S): at 23:17

## 2018-08-27 RX ADMIN — Medication 1000 UNIT(S): at 12:52

## 2018-08-27 RX ADMIN — Medication 40 MILLIEQUIVALENT(S): at 02:26

## 2018-08-27 RX ADMIN — MAGNESIUM OXIDE 400 MG ORAL TABLET 400 MILLIGRAM(S): 241.3 TABLET ORAL at 09:44

## 2018-08-27 RX ADMIN — MAGNESIUM OXIDE 400 MG ORAL TABLET 400 MILLIGRAM(S): 241.3 TABLET ORAL at 12:53

## 2018-08-27 RX ADMIN — Medication 50 MILLIGRAM(S): at 17:15

## 2018-08-27 RX ADMIN — Medication 81 MILLIGRAM(S): at 12:52

## 2018-08-27 RX ADMIN — PANTOPRAZOLE SODIUM 40 MILLIGRAM(S): 20 TABLET, DELAYED RELEASE ORAL at 06:52

## 2018-08-27 RX ADMIN — CALCITRIOL 0.5 MICROGRAM(S): 0.5 CAPSULE ORAL at 17:16

## 2018-08-27 NOTE — DISCHARGE NOTE ADULT - PLAN OF CARE
To relieve and prevent worsening symptoms associated with congestive heart failure, to improve quality of life, and to treat underlying conditions such as coronary heart disease, high blood pressure, or diabetes, and to maintain euvolemia. Low salt diet, fluid restriction to 1500 ml daily, monitor your fluid intake and weight daily, exercise as tolerated 30 minutes daily, and follow up with your physician within 1 to 2 weeks. To prevent shortness of breath, fluid overload, and electrolytes imbalance, and to slow down worsening kidney disease. Continue blood pressure, cholesterol and diabetic medications. Goal of hemoglobin A1C (HgbA1C) < 7%.  Avoid nephrotoxic drugs such as nonsteroidal anti-inflammatory agents (NSAIDs).   Please follow up with your nephrologist to monitor your kidney function, continue with low protein and potassium diet. To treat the underlying cause and restore a normal red blood cells level, to improve  the amount of oxygen that your blood can carry by increasing your hemoglobin and hematocrit level, and to prevent signs and symptoms such as shortness of breath, dizziness, weakness, congestive heart failure and death. Continue to take current medications as prescribed.  Follow up your routine physician's appointments.  If you notice any bleeding, please notify your doctor immediately or go to the nearest emergency room. To prevent long-term (chronic) symptoms that interfere with daily living, such as coughing, wheezing or shortness of breath during the night or after exercise.  To be able to participate in all activities of daily living, including work, school, and exercise.  To maintain near normal pulmonary function test and prevent asthma exacerbation. Continue current medications as prescribed.  Avoid exposures to environmental allergens such as carpets, pets and both first-hand and second-hand smoking.  During seasonal allergy period, take a shower as soon as you get home and change your clothes immediately.  Follow up your routine medical appointments. To maintain a normal blood glucose level and HgA1C level < 5.7 and to prevent diabetic complications such as uncontrolled diabetes, diabetic coma, blindness, renal failure, and amputations. Monitor finger sticks pre-meal and bedtime, low salt, fat and carbohydrate diet, minimize glucose intake.  Exercise daily for at least 30 minutes and weight loss.  Follow up with primary care physician and endocrinologist for routine Hemoglobin A1C checks and management.  Follow up with your ophthalmologist for routine yearly vision exams. To maintain a normal blood pressure to prevent heart attack, stroke and renal failure. Low sodium and fat diet, continue anti-hypertensive medications, and follow up with primary care physician. To prevent acid reflux, heartburn and chest pain. Avoid fatty, fried foods, acidic foods such as tomatoes, lime and chocolate. Continue to take your medications as prescribed, exercise daily and weight loss.

## 2018-08-27 NOTE — DISCHARGE NOTE ADULT - MEDICATION SUMMARY - MEDICATIONS TO TAKE
I will START or STAY ON the medications listed below when I get home from the hospital:    Aldactone 25 mg oral tablet  -- 1 tab(s) by mouth once a day   -- It is very important that you take or use this exactly as directed.  Do not skip doses or discontinue unless directed by your doctor.  May cause drowsiness or dizziness.    -- Indication: For Acute on chronic systolic heart failure    aspirin 81 mg oral delayed release tablet  -- 1 tab(s) by mouth once a day  -- Indication: For CAD    sacubitril-valsartan 24 mg-26 mg oral tablet  -- 1 tab(s) by mouth 2 times a day  -- Indication: For HTN    metoprolol succinate 25 mg oral tablet, extended release  -- 1 tab(s) by mouth once a day  -- Indication: For HTN    torsemide 20 mg oral tablet  -- 2 tab(s) by mouth 2 times a day   -- Avoid prolonged or excessive exposure to direct and/or artificial sunlight while taking this medication.  It is very important that you take or use this exactly as directed.  Do not skip doses or discontinue unless directed by your doctor.  It may be advisable to drink a full glass orange juice or eat a banana daily while taking this medication.    -- Indication: For CHF (congestive heart failure)    hydroCHLOROthiazide 50 mg oral tablet  -- 1 tab(s) by mouth 2 times a day  -- Indication: For HTN    famotidine 20 mg oral tablet  -- 1 tab(s) by mouth 2 times a day  -- Indication: For GERD    K-Tab 20 mEq oral tablet, extended release  -- 1 tab(s) by mouth once a day   -- It is very important that you take or use this exactly as directed.  Do not skip doses or discontinue unless directed by your doctor.  Medication should be taken with plenty of water.  Take with food or milk.    -- Indication: For supplements    magnesium oxide 400 mg (241.3 mg elemental magnesium) oral tablet  -- 1 tab(s) by mouth 3 times a day (with meals)  -- Indication: For supplements    Calphron 667 mg oral tablet  -- 1 tab(s) by mouth 3 times a day  -- Indication: For supplements    pantoprazole 40 mg oral delayed release tablet  -- 1 tab(s) by mouth once a day (before a meal)  -- Indication: For GERD    levothyroxine 175 mcg (0.175 mg) oral tablet  -- 1 tab(s) by mouth once a day  -- Indication: For Thyroid    calcium-vitamin D 500 mg-200 intl units oral tablet  -- 1 tab(s) by mouth 3 times a day  -- Indication: For supplements    calcitriol 0.5 mcg oral capsule  -- 1 cap(s) by mouth once a day (at bedtime)  -- Indication: For suppliments    cholecalciferol oral tablet  -- 1 tab(s) by mouth once a day , 1000 units  -- Indication: For suppliments

## 2018-08-27 NOTE — CONSULT NOTE ADULT - ASSESSMENT
This is a 62 year old woman with a past medical history of HTN, HLD, DM II, thyroid cancer, chronic systolic heart failure (LVEF 20%, LVIDd 6.5 cm), first diagnosed in 2013, CRT-ICD placement in June 2017, mod-severe MR, severe TR. She had recent cardiac cath at [place] with Dr. Xander Chavarria and reports  normal coronaries. Pt was admitted to [facility] 5/23/18-6/1/18 for acute on chronic systolic HF, course in hospital complicated by cardiogenic shock requiring IV inotropic support and episodes of NSVT. RHC on 5/25/18 which showed RA of 28, PCWP 37, PA 57/39/46 , EDP 23, PA sat 34.7%, CO 2.72, CI 1.21. She was offered a transfer to [facility] for consideration in LVAD, but patient refused. [place] was kept in and meds adjusted and she was eventually weaned off inotrope.      She was in [place] visiting her mother for 4 weeks. States she took all her medications (including torsemide 80 po BID), and ate low salt diet. She comes into hospital now b/c of icteric eyes and LE edema. Since admission she has diuresed -11 L, but still has severely elevated JVP, abdominal distension and LE edema and is cool b/l.  She states her weight is much better and wants to go home.

## 2018-08-27 NOTE — DISCHARGE NOTE ADULT - CARE PROVIDERS DIRECT ADDRESSES
,DirectAddress_Unknown ,DirectAddress_Unknown,DirectAddress_Unknown,[email or web address]

## 2018-08-27 NOTE — CONSULT NOTE ADULT - SUBJECTIVE AND OBJECTIVE BOX
HISTORY OF PRESENT ILLNESS: HPI:  61 y/o F with PMH of CHF(with EF of 10% s/p AICD) with recent CCU stay for inotrope assisted diuresis, DM, HTN, Thyroid cancer s/p thyroidectomy presented with the complaint of weight gain and LE swelling. As per the patient when she got discharged she was feeling fine and had gone to [place] to see her mother and was complaint with her medications(especially Torsemide) and was urinating a lot. Patient stated that she gained about 10 lb in 1 week and stated that it was 2/2 to not urinating as much even though she is taking the Torsemide. Patient stated that she was told by her cardiologist if she either eats salt or is not urinating much then double the dose of her Torsemide which she has been doing for the past week without much improvement of the LE. Patient stated that the swelling has progressed to her thighs which has caused her inability to move much. Patient stated that she normally sleeps with 1 pillow at night but if she is coughing she sleeps one 3 pillows. Patient also endorsed of orthopnea and PND. Patient stated that she does not eat food high in salt and also limits her water intake to 32 oz. Patient also had noticed of abdominal distention for the past few weeks. Patient also endorsed of productive cough with white/clear sputum production. Patient denied any CP, fevers, chills, N/V/D/C, abdominal pain, dysuria, melena, hematochezia, sick contact, pleuritic or positional chest pain.     On ED admission EKG revealed Atrial sensed V-paced rhythm at a rate of 94 with QTc of 617, CE x 1: Trop: 28, CK: 1026, CE x 2: Trop: 29, CK: 1022, H&H: 11.4/33.8, Na: 125, BUN/Cr: 51/1.78, Gluc: 188, Ca: 6.3, Bili: 13.2, Alk Phos: 420, AST: 80, BNP: 2262. Prelim CXR: Cardiomegaly. Clear lungs. In the ED patient received 1x dose of IV Bumex 2mg. When examined patient is resting in the stretcher and stated that she urinated "a little after getting the Bumex but she is not SOB when she is resting, but did endorse to bilateral LE swelling", denied any other complaints. (21 Aug 2018 00:36)      PAST MEDICAL & SURGICAL HISTORY:  Asthma  CHF (congestive heart failure): with EF of 10% s/p AICD  DM (diabetes mellitus)  HTN (hypertension)  Thyroid ca  AICD (automatic cardioverter/defibrillator) present  S/P thyroidectomy          MEDICATIONS:  MEDICATIONS  (STANDING):  aspirin enteric coated 81 milliGRAM(s) Oral daily  calcitriol   Capsule 0.5 MICROGram(s) Oral two times a day  calcium carbonate    500 mG (Tums) Chewable 2 Tablet(s) Chew three times a day  cholecalciferol 1000 Unit(s) Oral daily  dextrose 5%. 1000 milliLiter(s) (50 mL/Hr) IV Continuous <Continuous>  dextrose 50% Injectable 12.5 Gram(s) IV Push once  dextrose 50% Injectable 25 Gram(s) IV Push once  dextrose 50% Injectable 25 Gram(s) IV Push once  furosemide    Tablet 80 milliGRAM(s) Oral two times a day  heparin  Injectable 5000 Unit(s) SubCutaneous every 12 hours  insulin lispro (HumaLOG) corrective regimen sliding scale   SubCutaneous three times a day before meals  insulin lispro (HumaLOG) corrective regimen sliding scale   SubCutaneous at bedtime  levothyroxine 175 MICROGram(s) Oral daily  magnesium oxide 400 milliGRAM(s) Oral three times a day with meals  metoprolol succinate ER 25 milliGRAM(s) Oral daily  pantoprazole    Tablet 40 milliGRAM(s) Oral before breakfast  potassium chloride   Powder 40 milliEquivalent(s) Oral daily      Allergies    Isordil (Headache)  penicillin (Rash)    Intolerances        FAMILY HISTORY:  No pertinent family history in first degree relatives    Non-contributary for premature coronary disease or sudden cardiac death    SOCIAL HISTORY:    [ X] Non-smoker  [ ] Smoker  [ ] Alcohol      REVIEW OF SYSTEMS:  [ ]chest pain  [ X ]shortness of breath  [  ]palpitations  [  ]syncope  [ ]near syncope [ ]upper extremity weakness   [ ] lower extremity weakness  [  ]diplopia  [  ]altered mental status   [  ]fevers  [ ]chills [ ]nausea  [ ]vomitting  [  ]dysphagia    [ ]abdominal pain  [ ]melena  [ ]BRBPR    [  ]epistaxis  [  ]rash    [ X]lower extremity edema        [ X] All others negative	  [ ] Unable to obtain    PHYSICAL EXAM:  T(C): 36.7 (08-27-18 @ 06:10), Max: 36.7 (08-26-18 @ 12:11)  HR: 91 (08-27-18 @ 06:10) (78 - 91)  BP: 111/77 (08-27-18 @ 06:10) (111/77 - 114/78)  RR: 19 (08-27-18 @ 06:10) (18 - 19)  SpO2: 100% (08-27-18 @ 06:10) (99% - 100%)  Wt(kg): --  I&O's Summary    26 Aug 2018 07:01  -  27 Aug 2018 07:00  --------------------------------------------------------  IN: 0 mL / OUT: 2425 mL / NET: -2425 mL    27 Aug 2018 07:01  -  27 Aug 2018 11:38  --------------------------------------------------------  IN: 0 mL / OUT: 220 mL / NET: -220 mL          Gen: Appears well in NAD  HEENT:  (+) mild icterus (-)pallor  CV: N S1 S2 1/6 ROSALINDA (+)2 Pulses B/l  Resp:  Clear to ausculatation B/L, normal effort  GI: (+) BS Soft, NT, ND  Lymph:  (+)Edema, (-)obvious lymphadenopathy  Skin: Warm to touch, Normal turgor  Psych: Appropriate mood and affect      TELEMETRY: 	Sinus Vpaced    ECG:  	Sinus V paced 94 BPM  RADIOLOGY:      CXR:Cardiomediastinal silhouette is prominent, and has a globular contour,   suggesting underlying pericardial effusion.  Echocardiogram or cross-sectional imaging can be performed for further   evaluation.        	  	  LABS:	 	    CARDIAC MARKERS:  CARDIAC MARKERS ( 27 Aug 2018 06:07 )  x     / x     / 313 u/L / x     / x      CARDIAC MARKERS ( 26 Aug 2018 06:31 )  x     / x     / 323 u/L / x     / x                                  13.2   6.03  )-----------( 156      ( 27 Aug 2018 06:07 )             40.6     Hb Trend: 13.2<--    08-27    132<L>  |  86<L>  |  28<H>  ----------------------------<  138<H>  4.8   |  25  |  1.06    Ca    7.6<L>      27 Aug 2018 06:07  Phos  5.1     08-27  Mg     1.9     08-27    TPro  7.8  /  Alb  3.4  /  TBili  8.2<H>  /  DBili  5.4<H>  /  AST  113<H>  /  ALT  57<H>  /  AlkPhos  432<H>  08-27    Creatinine Trend: 1.06<--, 1.16<--, 1.14<--, 1.13<--, 1.27<--, 1.34<--      ASSESSMENT/PLAN: 	62y Female  61 y/o female PMH severe NICM (LVEF 10-15%) who had a [employer] Biv-ICD implanted at [facility], hx DM, HTN, thyroid ca s/p thyroidectomy, recent LHC at [facility] was also unremarkable.  She was hospitalized last month for decompensated CHF requiring ionotrope assisted diuresis.  She comes to ED with c/o 10 lb weight gain in 1 week, LE edema, orthopnea, c/w acute on chronic systolic HF exacerbation, also with hypocalcemia, MAGNUS, elevated LFTs and elevated CPK.      - Cont IV lasix to keep O>I  - If she is refusing IV diuretics would give PO torsemide  - Appears to be perfusing adequately cont BB for Now  - GI f/u    Augusto Grimes MD, FACC    -

## 2018-08-27 NOTE — CHART NOTE - NSCHARTNOTEFT_GEN_A_CORE
Bumex gtt ordered per CHF team recommendations. Patient continues to refuse IV diuretics. Will reorder Lasix 80mg PO BID as this is the only diuretic that she is accepting at the moment. Per conversation with Dr. Muller earlier this AM, if patient wants to leave the hospital, it will be AMA Bumex gtt ordered per CHF team recommendations. Patient continues to refuse IV diuretics. Will reorder Lasix 80mg PO BID as this is the only diuretic that she is accepting at the moment. Per conversation with Dr. Muller earlier this AM, if patient wants to leave the hospital, it will be AMA as patient is still fluid overloaded. Bumex gtt ordered per CHF team recommendations. Patient continues to refuse IV diuretics. Will reorder Lasix 80mg PO BID as this is the only diuretic that she is accepting at the moment. Per conversation with Dr. Muller earlier this AM, if patient wants to leave the hospital, it will be AMA as patient is still fluid overloaded.  ADDENDUM: per CHF recommendations, will switch Lasix to Torsemide 80mg po BID as pt refusing Bumex gtt.

## 2018-08-27 NOTE — DISCHARGE NOTE ADULT - SECONDARY DIAGNOSIS.
MAGNUS (acute kidney injury) Anemia Asthma DM (diabetes mellitus) Essential hypertension Gastroesophageal reflux disease without esophagitis

## 2018-08-27 NOTE — PROGRESS NOTE ADULT - SUBJECTIVE AND OBJECTIVE BOX
No pain, no shortness of breath      VITAL:  T(C): , Max: 36.8 (08-27-18 @ 12:46)  T(F): , Max: 98.2 (08-27-18 @ 12:46)  HR: 82 (08-27-18 @ 12:46)  BP: 96/64 (08-27-18 @ 12:46)  RR: 18 (08-27-18 @ 12:46)  SpO2: 99% (08-27-18 @ 12:46)      PHYSICAL EXAM:  Constitutional: NAD, Alert  HEENT: NCAT, MMM; (+)scleral icterus  Neck: Supple, (+) JVD  Respiratory: CTA-b/l  Cardiovascular: RRR s1s2, no m/r/g  Gastrointestinal: BS+, soft, NT/ND  Extremities: 2+ b/l LE edema  Neurological: no focal deficits; strength grossly intact  Back: no CVAT b/l  Skin: No rashes, no nevi      LABS:                        13.2   6.03  )-----------( 156      ( 27 Aug 2018 06:07 )             40.6     Na(132)/K(4.8)/Cl(86)/HCO3(25)/BUN(28)/Cr(1.06)Glu(138)/Ca(7.6)/Mg(1.9)/PO4(5.1)    08-27 @ 06:07  Na(133)/K(3.4)/Cl(85)/HCO3(29)/BUN(28)/Cr(1.16)Glu(197)/Ca(7.4)/Mg(--)/PO4(--)    08-26 @ 20:30  Na(134)/K(2.7)/Cl(84)/HCO3(32)/BUN(28)/Cr(1.14)Glu(140)/Ca(7.5)/Mg(1.8)/PO4(--)    08-26 @ 06:31      IMPRESSION: 62F w/ HFrEF (10%)-AICD, DM2, HTN, thyroid CA-thyroidectomy, and hypoparathyroidism, 8/20/18 a/w acute on chronic HFrEF/MAGNUS    (1)Renal - MAGNUS - prerenally mediated in association with her decompensated CHF. Substantially improved over past few days, with diuresis    (2)Hypocalcemia - hypoparathyroidism - on Tums and Calcitriol. HCTZ discontinued over weekend. I strongly favor having her on high dose HCTZ as part of her diuretic regimen given her hypocalcemia.    (3)Hyponatremia - hypervolemic hyponatremia - improved with diuresis. Labs today pending.    (4)Hypokalemia - diuretic-associated. Repleted yesterday. Labs today pending.    (5)Liver - refusing liver biopsy    (6)CV - Cardiology and CHF team input noted-  there is interest in a bumex gtt, provided patient is agreeable      RECOMMEND:  (1)Reinstate high-dose HCTZ (50mg po bid)  (2)Tums/Calcitriol as ordered  (3)No objection to bumex gtt            Brian Henry MD  [facility]  [phone] No pain, no shortness of breath  Wants to go home.    VITAL:  T(C): , Max: 36.8 (08-27-18 @ 12:46)  T(F): , Max: 98.2 (08-27-18 @ 12:46)  HR: 82 (08-27-18 @ 12:46)  BP: 96/64 (08-27-18 @ 12:46)  RR: 18 (08-27-18 @ 12:46)  SpO2: 99% (08-27-18 @ 12:46)      PHYSICAL EXAM:  Constitutional: NAD, Alert  HEENT: NCAT, MMM; (+)scleral icterus  Neck: Supple, (+) JVD  Respiratory: CTA-b/l  Cardiovascular: RRR s1s2, no m/r/g  Gastrointestinal: BS+, soft, (+)distension  Extremities: 3+ b/l LE edema  Neurological: no focal deficits; strength grossly intact  Back: no CVAT b/l  Skin: No rashes, no nevi      LABS:                        13.2   6.03  )-----------( 156      ( 27 Aug 2018 06:07 )             40.6     Na(132)/K(4.8)/Cl(86)/HCO3(25)/BUN(28)/Cr(1.06)Glu(138)/Ca(7.6)/Mg(1.9)/PO4(5.1)    08-27 @ 06:07  Na(133)/K(3.4)/Cl(85)/HCO3(29)/BUN(28)/Cr(1.16)Glu(197)/Ca(7.4)/Mg(--)/PO4(--)    08-26 @ 20:30  Na(134)/K(2.7)/Cl(84)/HCO3(32)/BUN(28)/Cr(1.14)Glu(140)/Ca(7.5)/Mg(1.8)/PO4(--)    08-26 @ 06:31      IMPRESSION: 62F w/ HFrEF (10%)-AICD, DM2, HTN, thyroid CA-thyroidectomy, and hypoparathyroidism, 8/20/18 a/w acute on chronic HFrEF/MAGNUS    (1)Renal - MAGNUS - prerenally mediated in association with her decompensated CHF. Substantially improved over past few days, with diuresis    (2)Hypocalcemia - hypoparathyroidism - on Tums and Calcitriol. HCTZ discontinued over weekend. I strongly favor having her on high dose HCTZ as part of her diuretic regimen given her hypocalcemia.    (3)Hyponatremia - hypervolemic hyponatremia - improved with diuresis. Labs today pending.    (4)Hypokalemia - diuretic-associated. Repleted yesterday. Labs today pending.    (5)Liver - refusing liver biopsy    (6)CV - Cardiology and CHF team input noted-  there is interest in a bumex gtt, provided patient is agreeable      RECOMMEND:  (1)Reinstate high-dose HCTZ (50mg po bid)  (2)Tums/Calcitriol as ordered  (3)No objection to bumex gtt            Brian Henry MD  [facility]  [phone]

## 2018-08-27 NOTE — CONSULT NOTE ADULT - PROBLEM SELECTOR RECOMMENDATION 9
- daily weights  - monitor I & O's   - fluid restriction   - Low sodium diet  - diurese per cardiology
She is still severely volume overloaded, and is cool on exam.   JVP 20 cm. 2-3+ LE edema.   Spent > 30 minutes explaining her cardiac condition and  the risks of cardiac event if she leaves while still in [facility]. She states she will take the risks, but wants to go home.   -If she stays I would suggest Bumex gtt at 1 mg/hr.   Need to follow up LFTs.

## 2018-08-27 NOTE — PROGRESS NOTE ADULT - PROBLEM SELECTOR PROBLEM 3
Acute on chronic systolic (congestive) heart failure

## 2018-08-27 NOTE — CONSULT NOTE ADULT - SUBJECTIVE AND OBJECTIVE BOX
Date of Admission: 18    CHIEF COMPLAINT: SOB    HISTORY OF PRESENT ILLNESS:    This is a 62 year old woman with a past medical history of HTN, HLD, DM II, thyroid cancer, chronic systolic heart failure (LVEF 20%, LVIDd 6.5 cm), first diagnosed in , CRT-ICD placement in 2017, mod-severe MR, severe TR. She had recent cardiac cath at [place] with Dr. Xander Chavarria and reports  normal coronaries. Pt was admitted to [facility] 18-18 for acute on chronic systolic HF, course in hospital complicated by cardiogenic shock requiring IV inotropic support and episodes of NSVT. RHC on 18 which showed RA of 28, PCWP 37, PA 57/39/46 , EDP 23, PA sat 34.7%, CO 2.72, CI 1.21. She was offered a transfer to [facility] for consideration in LVAD, but patient refused. [place] was kept in and meds adjusted and she was eventually weaned off inotrope.          Allergies    Isordil (Headache)  penicillin (Rash)  	    MEDICATIONS:  aspirin enteric coated 81 milliGRAM(s) Oral daily  furosemide    Tablet 80 milliGRAM(s) Oral two times a day  heparin  Injectable 5000 Unit(s) SubCutaneous every 12 hours  metoprolol succinate ER 25 milliGRAM(s) Oral daily  calcium carbonate    500 mG (Tums) Chewable 2 Tablet(s) Chew three times a day  pantoprazole    Tablet 40 milliGRAM(s) Oral before breakfast  dextrose 40% Gel 15 Gram(s) Oral once PRN  dextrose 50% Injectable 12.5 Gram(s) IV Push once  dextrose 50% Injectable 25 Gram(s) IV Push once  dextrose 50% Injectable 25 Gram(s) IV Push once  glucagon  Injectable 1 milliGRAM(s) IntraMuscular once PRN  insulin lispro (HumaLOG) corrective regimen sliding scale   SubCutaneous three times a day before meals  insulin lispro (HumaLOG) corrective regimen sliding scale   SubCutaneous at bedtime  levothyroxine 175 MICROGram(s) Oral daily  calcitriol   Capsule 0.5 MICROGram(s) Oral two times a day  cholecalciferol 1000 Unit(s) Oral daily  dextrose 5%. 1000 milliLiter(s) IV Continuous <Continuous>  magnesium oxide 400 milliGRAM(s) Oral three times a day with meals  potassium chloride   Powder 40 milliEquivalent(s) Oral daily      PAST MEDICAL & SURGICAL HISTORY:  Asthma  CHF (congestive heart failure): with EF of 10% s/p AICD  DM (diabetes mellitus)  HTN (hypertension)  Thyroid ca  AICD (automatic cardioverter/defibrillator) present  S/P thyroidectomy      FAMILY HISTORY:  No pertinent family history in first degree relatives      SOCIAL HISTORY:        REVIEW OF SYSTEMS:  CONSTITUTIONAL: No fever, weight loss, or fatigue  EYES: No eye pain, visual disturbances, or discharge  ENMT:  No difficulty hearing, tinnitus, vertigo; No sinus or throat pain  NECK: No pain or stiffness  RESPIRATORY: No cough, wheezing, chills or hemoptysis; No Shortness of Breath  CARDIOVASCULAR: No chest pain, palpitations, passing out, dizziness, or leg swelling  GASTROINTESTINAL: No abdominal or epigastric pain. No nausea, vomiting, or hematemesis; No diarrhea or constipation. No melena or hematochezia.  GENITOURINARY: No dysuria, frequency, hematuria, or incontinence  NEUROLOGICAL: No headaches, memory loss, loss of strength, numbness, or tremors  SKIN: No itching, burning, rashes, or lesions   LYMPH Nodes: No enlarged glands  ENDOCRINE: No heat or cold intolerance; No hair loss  MUSCULOSKELETAL: No joint pain or swelling; No muscle, back, or extremity pain  PSYCHIATRIC: No depression, anxiety, mood swings, or difficulty sleeping  HEME/LYMPH: No easy bruising, or bleeding gums  ALLERY AND IMMUNOLOGIC: No hives or eczema	    [ ] All others negative	  [ ] Unable to obtain    PHYSICAL EXAM:  T(C): 36.7 (18 @ 06:10), Max: 36.7 (18 @ 12:11)  HR: 91 (18 @ 06:10) (78 - 91)  BP: 111/77 (18 @ 06:10) (111/77 - 114/78)  RR: 19 (18 @ 06:10) (18 - 19)  SpO2: 100% (18 @ 06:10) (99% - 100%)  Wt(kg): --  I&O's Summary    26 Aug 2018 07:01  -  27 Aug 2018 07:00  --------------------------------------------------------  IN: 0 mL / OUT: 2425 mL / NET: -2425 mL    27 Aug 2018 07:  -  27 Aug 2018 11:55  --------------------------------------------------------  IN: 0 mL / OUT: 220 mL / NET: -220 mL        Appearance: Normal	  HEENT:   Normal oral mucosa, PERRL, EOMI	  Lymphatic: No lymphadenopathy  Cardiovascular: Normal S1 S2, No JVD, No murmurs, No edema  Respiratory: Lungs clear to auscultation	  Psychiatry: A & O x 3, Mood & affect appropriate  Gastrointestinal:  Soft, Non-tender, + BS	  Skin: No rashes, No ecchymoses, No cyanosis	  Neurologic: Non-focal  Extremities: Normal range of motion, No clubbing, cyanosis or edema  Vascular: Peripheral pulses palpable 2+ bilaterally        LABS:	 	    CBC Full  -  ( 27 Aug 2018 06:07 )  WBC Count : 6.03 K/uL  Hemoglobin : 13.2 g/dL  Hematocrit : 40.6 %  Platelet Count - Automated : 156 K/uL  Mean Cell Volume : 71.1 fL  Mean Cell Hemoglobin : 23.1 pg  Mean Cell Hemoglobin Concentration : 32.5 %  Auto Neutrophil # : 3.96 K/uL  Auto Lymphocyte # : 1.33 K/uL  Auto Monocyte # : 0.60 K/uL  Auto Eosinophil # : 0.03 K/uL  Auto Basophil # : 0.08 K/uL  Auto Neutrophil % : 65.6 %  Auto Lymphocyte % : 22.1 %  Auto Monocyte % : 10.0 %  Auto Eosinophil % : 0.5 %  Auto Basophil % : 1.3 %        132<L>  |  86<L>  |  28<H>  ----------------------------<  138<H>  4.8   |  25  |  1.06  08    133<L>  |  85<L>  |  28<H>  ----------------------------<  197<H>  3.4<L>   |  29  |  1.16    Ca    7.6<L>      27 Aug 2018 06:07  Ca    7.4<L>      26 Aug 2018 20:30  Phos  5.1       Mg     1.9       Mg     1.8         TPro  7.8  /  Alb  3.4  /  TBili  8.2<H>  /  DBili  5.4<H>  /  AST  113<H>  /  ALT  57<H>  /  AlkPhos  432<H>    TPro  7.0  /  Alb  3.3  /  TBili  7.4<H>  /  DBili  5.1<H>  /  AST  78<H>  /  ALT  41<H>  /  AlkPhos  388<H>        < from: Transthoracic Echocardiogram (18 @ 11:25) >  DIMENSIONS:  Dimensions:     Normal Values:  LA:     4.7 cm    2.0 - 4.0 cm  Ao:     2.7 cm    2.0 - 3.8 cm  SEPTUM: 0.6 cm    0.6 - 1.2 cm  PWT:    0.8 cm    0.6 - 1.1 cm  LVIDd:  6.5 cm    3.0 - 5.6 cm  LVIDs:  5.9 cm    1.8 - 4.0 cm  Derived Variables:  LVMI: 87 g/m2  RWT: 0.24  Fractional short: 9 %  Ejection Fraction (Teicholtz): 20 %  ------------------------------------------------------------------------  OBSERVATIONS:  Mitral Valve: Tethered mitral valve leaflets with normal  opening. Moderate-severe mitral regurgitation.  Aortic Root: Normal aortic root.  Aortic Valve: Calcified trileaflet aortic valve with normal  opening. Minimal aortic regurgitation.  Left Atrium: Moderately dilated left atrium.  LA volume  index = 46 cc/m2.  Left Ventricle: Severe global left ventricular systolic  dysfunction. Normal left ventricular internal dimensions  and wall thicknesses.  Right Heart: Moderate right atrial enlargement.  A device  wire is noted in the right heart. Right ventricular  enlargement with decreased right ventricular systolic  function. Normal tricuspid valve.  Severe tricuspid  regurgitation. Normal pulmonic valve. Minimal pulmonic  regurgitation.  Pericardium/PleuraNormal pericardium with no pericardial  effusion.  Hemodynamic: Estimated right ventricular systolic pressure  equals 24 mm Hg, assuming right atrial pressure equals 10  mm Hg, consistent with normal pulmonary pressures.    < end of copied text >    < from: Cardiac Cath Lab - Adult (18 @ 11:46) >  HEMODYNAMIC TABLES  Pressures:  Baseline  Pressures:  - HR: 70  Pressures:  - Rhythm:  Pressures:  -- Pulmonary Artery (S/D/M): 57/39/46  Pressures:  -- Pulmonary Capillary Wedge: 39/45/37  Pressures:  -- Right Atrium (a/v/M): 30/33/28  Pressures:  -- Right Ventricle (s/edp): 50/23/--  O2 Sats:  Baseline  O2 Sats:  - HR: 70  O2 Sats:  - Rhythm:  O2 Sats:  -- AO: 11.7/100/15.91  O2 Sats:  -- PA: 11.7/35.2/5.6  O2 Sats:  -- PA: 11.7/34.7/5.52  Outputs:  Baseline  Outputs:  -- CALCULATIONS[age]  Outputs:  -- CALCULATIONS: Body Surface Area: 2.25  Outputs:  -- CALCULATIONS: Height in cm: 170.00  Outputs:  -- CALCULATIONS: Sex: Female  Outputs:  -- CALCULATIONS: Weight in k.00  Outputs:  -- OUTPUTS: CO by Salvador: 2.72  Outputs:  -- OUTPUTS: Salvador cardiac index: 1.21  Outputs:  -- OUTPUTS: O2 consumption: 281.40  Outputs:  -- OUTPUTS: Vo2 Indexed: 125.00  Outputs:  -- RESISTANCES: Pulmonary vascular index (dsc): 596.05  Outputs:  -- RESISTANCES: Pulmonary vascular index (Wood Units): 7.45  Outputs:  -- RESISTANCES: Pulmonary vascular resistance (dsc): 264.77  Outputs:  -- RESISTANCES: Pulmonary vascular resistance (Wood Units): 3.31  Outputs:  -- RESISTANCES: Right ventricular stroke work: 7.92  Outputs:  -- RESISTANCES: Right ventricular stroke work index: 3.52  Outputs:  -- RESISTANCES: Total pulmonary index (dsc): 3046.50  Outputs:  -- RESISTANCES: Total pulmonary index (Wood Units): 38.09  Outputs:  -- RESISTANCES: Total pulmonary resistance (dsc): 1353.26  Outputs:  -- RESISTANCES: Total pulmonary resistance (Wood Units): 16.92  Outputs:  -- SHUNTS: Pulmonary flow: 2.72  Outputs:  -- SHUNTS: Qp Indexed: 1.21  Outputs:  -- SHUNTS: Qs Indexed: 1.21  Outputs:  -- SHUNTS: Systemic flow: 2.72    < end of copied text >    < from: Cardiac Cath Lab (14 @ 16:49) >  CORONARY VESSELS: The coronary circulation is co-dominant.  LM:   -- LM: Normal.  LAD:   --  LAD: Normal.  CX:   --  Circumflex: Normal.  RCA:   --  RCA: Normal.    < end of copied text > Date of Admission: 18    CHIEF COMPLAINT: SOB    HISTORY OF PRESENT ILLNESS:    This is a 62 year old woman with a past medical history of HTN, HLD, DM II, thyroid cancer, chronic systolic heart failure (LVEF 20%, LVIDd 6.5 cm), first diagnosed in , CRT-ICD placement in 2017, mod-severe MR, severe TR. She had recent cardiac cath at [place] with Dr. Xander Chavarria and reports  normal coronaries. Pt was admitted to [facility] 18-18 for acute on chronic systolic HF, course in hospital complicated by cardiogenic shock requiring IV inotropic support and episodes of NSVT. RHC on 18 which showed RA of 28, PCWP 37, PA 57/39/46 , EDP 23, PA sat 34.7%, CO 2.72, CI 1.21. She was offered a transfer to [facility] for consideration in LVAD, but patient refused. [place] was kept in and meds adjusted and she was eventually weaned off inotrope.      She was in [place] visiting her mother for 4 weeks. States she took all her medications (including torsemide 80 po BID), and ate low salt diet. She comes into hospital now b/c of icteric eyes and LE edema. Since admission she has diuresed -11 L, but still has severely elevated JVP, abdominal distension and LE edema and is cool b/l.  She states her weight is much better and wants to go home.         Allergies    Isordil (Headache)  penicillin (Rash)  	    MEDICATIONS:  aspirin enteric coated 81 milliGRAM(s) Oral daily  furosemide    Tablet 80 milliGRAM(s) Oral two times a day  heparin  Injectable 5000 Unit(s) SubCutaneous every 12 hours  metoprolol succinate ER 25 milliGRAM(s) Oral daily  calcium carbonate    500 mG (Tums) Chewable 2 Tablet(s) Chew three times a day  pantoprazole    Tablet 40 milliGRAM(s) Oral before breakfast  dextrose 40% Gel 15 Gram(s) Oral once PRN  dextrose 50% Injectable 12.5 Gram(s) IV Push once  dextrose 50% Injectable 25 Gram(s) IV Push once  dextrose 50% Injectable 25 Gram(s) IV Push once  glucagon  Injectable 1 milliGRAM(s) IntraMuscular once PRN  insulin lispro (HumaLOG) corrective regimen sliding scale   SubCutaneous three times a day before meals  insulin lispro (HumaLOG) corrective regimen sliding scale   SubCutaneous at bedtime  levothyroxine 175 MICROGram(s) Oral daily  calcitriol   Capsule 0.5 MICROGram(s) Oral two times a day  cholecalciferol 1000 Unit(s) Oral daily  dextrose 5%. 1000 milliLiter(s) IV Continuous <Continuous>  magnesium oxide 400 milliGRAM(s) Oral three times a day with meals  potassium chloride   Powder 40 milliEquivalent(s) Oral daily      PAST MEDICAL & SURGICAL HISTORY:  Asthma  CHF (congestive heart failure): with EF of 10% s/p AICD  DM (diabetes mellitus)  HTN (hypertension)  Thyroid ca  AICD (automatic cardioverter/defibrillator) present  S/P thyroidectomy      FAMILY HISTORY:  No pertinent family history in first degree relatives      SOCIAL HISTORY:        REVIEW OF SYSTEMS:  CONSTITUTIONAL: No fever, weight loss, or fatigue  EYES: No eye pain, visual disturbances, or discharge  ENMT:  No difficulty hearing, tinnitus, vertigo; No sinus or throat pain  NECK: No pain or stiffness  RESPIRATORY: No cough, wheezing, chills or hemoptysis; No Shortness of Breath  CARDIOVASCULAR: No chest pain, palpitations, passing out, dizziness, or leg swelling  GASTROINTESTINAL: No abdominal or epigastric pain. No nausea, vomiting, or hematemesis; No diarrhea or constipation. No melena or hematochezia.  GENITOURINARY: No dysuria, frequency, hematuria, or incontinence  NEUROLOGICAL: No headaches, memory loss, loss of strength, numbness, or tremors  SKIN: No itching, burning, rashes, or lesions   LYMPH Nodes: No enlarged glands  ENDOCRINE: No heat or cold intolerance; No hair loss  MUSCULOSKELETAL: No joint pain or swelling; No muscle, back, or extremity pain  PSYCHIATRIC: No depression, anxiety, mood swings, or difficulty sleeping  HEME/LYMPH: No easy bruising, or bleeding gums  ALLERY AND IMMUNOLOGIC: No hives or eczema	    [ ] All others negative	  [ ] Unable to obtain    PHYSICAL EXAM:  T(C): 36.7 (18 @ 06:10), Max: 36.7 (18 @ 12:11)  HR: 91 (18 @ 06:10) (78 - 91)  BP: 111/77 (18 @ 06:10) (111/77 - 114/78)  RR: 19 (08-27-18 @ 06:10) (18 - 19)  SpO2: 100% (18 @ 06:10) (99% - 100%)  Wt(kg): --  I&O's Summary    26 Aug 2018 07:  -  27 Aug 2018 07:00  --------------------------------------------------------  IN: 0 mL / OUT: 2425 mL / NET: -2425 mL    27 Aug 2018 07:01  -  27 Aug 2018 11:55  --------------------------------------------------------  IN: 0 mL / OUT: 220 mL / NET: -220 mL        Appearance: Normal	  HEENT:   Normal oral mucosa, PERRL, EOMI	  Lymphatic: No lymphadenopathy  Cardiovascular: Normal S1 S2, No JVD, No murmurs, No edema  Respiratory: Lungs clear to auscultation	  Psychiatry: A & O x 3, Mood & affect appropriate  Gastrointestinal:  Soft, Non-tender, + BS	  Skin: No rashes, No ecchymoses, No cyanosis	  Neurologic: Non-focal  Extremities: Normal range of motion, No clubbing, cyanosis or edema  Vascular: Peripheral pulses palpable 2+ bilaterally        LABS:	 	    CBC Full  -  ( 27 Aug 2018 06:07 )  WBC Count : 6.03 K/uL  Hemoglobin : 13.2 g/dL  Hematocrit : 40.6 %  Platelet Count - Automated : 156 K/uL  Mean Cell Volume : 71.1 fL  Mean Cell Hemoglobin : 23.1 pg  Mean Cell Hemoglobin Concentration : 32.5 %  Auto Neutrophil # : 3.96 K/uL  Auto Lymphocyte # : 1.33 K/uL  Auto Monocyte # : 0.60 K/uL  Auto Eosinophil # : 0.03 K/uL  Auto Basophil # : 0.08 K/uL  Auto Neutrophil % : 65.6 %  Auto Lymphocyte % : 22.1 %  Auto Monocyte % : 10.0 %  Auto Eosinophil % : 0.5 %  Auto Basophil % : 1.3 %        132<L>  |  86<L>  |  28<H>  ----------------------------<  138<H>  4.8   |  25  |  1.06      133<L>  |  85<L>  |  28<H>  ----------------------------<  197<H>  3.4<L>   |  29  |  1.16    Ca    7.6<L>      27 Aug 2018 06:07  Ca    7.4<L>      26 Aug 2018 20:30  Phos  5.1       Mg     1.9       Mg     1.8         TPro  7.8  /  Alb  3.4  /  TBili  8.2<H>  /  DBili  5.4<H>  /  AST  113<H>  /  ALT  57<H>  /  AlkPhos  432<H>    TPro  7.0  /  Alb  3.3  /  TBili  7.4<H>  /  DBili  5.1<H>  /  AST  78<H>  /  ALT  41<H>  /  AlkPhos  388<H>        < from: Transthoracic Echocardiogram (18 @ 11:25) >  DIMENSIONS:  Dimensions:     Normal Values:  LA:     4.7 cm    2.0 - 4.0 cm  Ao:     2.7 cm    2.0 - 3.8 cm  SEPTUM: 0.6 cm    0.6 - 1.2 cm  PWT:    0.8 cm    0.6 - 1.1 cm  LVIDd:  6.5 cm    3.0 - 5.6 cm  LVIDs:  5.9 cm    1.8 - 4.0 cm  Derived Variables:  LVMI: 87 g/m2  RWT: 0.24  Fractional short: 9 %  Ejection Fraction (Teicholtz): 20 %  ------------------------------------------------------------------------  OBSERVATIONS:  Mitral Valve: Tethered mitral valve leaflets with normal  opening. Moderate-severe mitral regurgitation.  Aortic Root: Normal aortic root.  Aortic Valve: Calcified trileaflet aortic valve with normal  opening. Minimal aortic regurgitation.  Left Atrium: Moderately dilated left atrium.  LA volume  index = 46 cc/m2.  Left Ventricle: Severe global left ventricular systolic  dysfunction. Normal left ventricular internal dimensions  and wall thicknesses.  Right Heart: Moderate right atrial enlargement.  A device  wire is noted in the right heart. Right ventricular  enlargement with decreased right ventricular systolic  function. Normal tricuspid valve.  Severe tricuspid  regurgitation. Normal pulmonic valve. Minimal pulmonic  regurgitation.  Pericardium/PleuraNormal pericardium with no pericardial  effusion.  Hemodynamic: Estimated right ventricular systolic pressure  equals 24 mm Hg, assuming right atrial pressure equals 10  mm Hg, consistent with normal pulmonary pressures.    < end of copied text >    < from: Cardiac Cath Lab - Adult (18 @ 11:46) >  HEMODYNAMIC TABLES  Pressures:  Baseline  Pressures:  - HR: 70  Pressures:  - Rhythm:  Pressures:  -- Pulmonary Artery (S/D/M): 57/39/46  Pressures:  -- Pulmonary Capillary Wedge: 39/45/37  Pressures:  -- Right Atrium (a/v/M): 30/33/28  Pressures:  -- Right Ventricle (s/edp): 50/23/--  O2 Sats:  Baseline  O2 Sats:  - HR: 70  O2 Sats:  - Rhythm:  O2 Sats:  -- AO: 11.7/100/15.91  O2 Sats:  -- PA: 11.7/35.2/5.6  O2 Sats:  -- PA: 11.7/34.7/5.52  Outputs:  Baseline  Outputs:  -- CALCULATIONS[age]  Outputs:  -- CALCULATIONS: Body Surface Area: 2.25  Outputs:  -- CALCULATIONS: Height in cm: 170.00  Outputs:  -- CALCULATIONS: Sex: Female  Outputs:  -- CALCULATIONS: Weight in k.00  Outputs:  -- OUTPUTS: CO by Salvador: 2.72  Outputs:  -- OUTPUTS: Salvador cardiac index: 1.21  Outputs:  -- OUTPUTS: O2 consumption: 281.40  Outputs:  -- OUTPUTS: Vo2 Indexed: 125.00  Outputs:  -- RESISTANCES: Pulmonary vascular index (dsc): 596.05  Outputs:  -- RESISTANCES: Pulmonary vascular index (Wood Units): 7.45  Outputs:  -- RESISTANCES: Pulmonary vascular resistance (dsc): 264.77  Outputs:  -- RESISTANCES: Pulmonary vascular resistance (Wood Units): 3.31  Outputs:  -- RESISTANCES: Right ventricular stroke work: 7.92  Outputs:  -- RESISTANCES: Right ventricular stroke work index: 3.52  Outputs:  -- RESISTANCES: Total pulmonary index (dsc): 3046.50  Outputs:  -- RESISTANCES: Total pulmonary index (Wood Units): 38.09  Outputs:  -- RESISTANCES: Total pulmonary resistance (dsc): 1353.26  Outputs:  -- RESISTANCES: Total pulmonary resistance (Wood Units): 16.92  Outputs:  -- SHUNTS: Pulmonary flow: 2.72  Outputs:  -- SHUNTS: Qp Indexed: 1.21  Outputs:  -- SHUNTS: Qs Indexed: 1.21  Outputs:  -- SHUNTS: Systemic flow: 2.72    < end of copied text >    < from: Cardiac Cath Lab (14 @ 16:49) >  CORONARY VESSELS: The coronary circulation is co-dominant.  LM:   -- LM: Normal.  LAD:   --  LAD: Normal.  CX:   --  Circumflex: Normal.  RCA:   --  RCA: Normal.    < end of copied text >

## 2018-08-27 NOTE — PROGRESS NOTE ADULT - PROBLEM SELECTOR PLAN 3
- daily weights  - monitor I & O's   - fluid restriction   - Low sodium diet  - continue diureses per cardiology

## 2018-08-27 NOTE — DISCHARGE NOTE ADULT - CARE PLAN
Principal Discharge DX:	Acute on chronic systolic (congestive) heart failure  Goal:	To relieve and prevent worsening symptoms associated with congestive heart failure, to improve quality of life, and to treat underlying conditions such as coronary heart disease, high blood pressure, or diabetes, and to maintain euvolemia.  Assessment and plan of treatment:	Low salt diet, fluid restriction to 1500 ml daily, monitor your fluid intake and weight daily, exercise as tolerated 30 minutes daily, and follow up with your physician within 1 to 2 weeks.  Secondary Diagnosis:	MAGNUS (acute kidney injury)  Goal:	To prevent shortness of breath, fluid overload, and electrolytes imbalance, and to slow down worsening kidney disease.  Assessment and plan of treatment:	Continue blood pressure, cholesterol and diabetic medications. Goal of hemoglobin A1C (HgbA1C) < 7%.  Avoid nephrotoxic drugs such as nonsteroidal anti-inflammatory agents (NSAIDs).   Please follow up with your nephrologist to monitor your kidney function, continue with low protein and potassium diet.  Secondary Diagnosis:	Anemia  Goal:	To treat the underlying cause and restore a normal red blood cells level, to improve  the amount of oxygen that your blood can carry by increasing your hemoglobin and hematocrit level, and to prevent signs and symptoms such as shortness of breath, dizziness, weakness, congestive heart failure and death.  Assessment and plan of treatment:	Continue to take current medications as prescribed.  Follow up your routine physician's appointments.  If you notice any bleeding, please notify your doctor immediately or go to the nearest emergency room.  Secondary Diagnosis:	Asthma  Goal:	To prevent long-term (chronic) symptoms that interfere with daily living, such as coughing, wheezing or shortness of breath during the night or after exercise.  To be able to participate in all activities of daily living, including work, school, and exercise.  To maintain near normal pulmonary function test and prevent asthma exacerbation.  Assessment and plan of treatment:	Continue current medications as prescribed.  Avoid exposures to environmental allergens such as carpets, pets and both first-hand and second-hand smoking.  During seasonal allergy period, take a shower as soon as you get home and change your clothes immediately.  Follow up your routine medical appointments.  Secondary Diagnosis:	DM (diabetes mellitus)  Goal:	To maintain a normal blood glucose level and HgA1C level < 5.7 and to prevent diabetic complications such as uncontrolled diabetes, diabetic coma, blindness, renal failure, and amputations.  Assessment and plan of treatment:	Monitor finger sticks pre-meal and bedtime, low salt, fat and carbohydrate diet, minimize glucose intake.  Exercise daily for at least 30 minutes and weight loss.  Follow up with primary care physician and endocrinologist for routine Hemoglobin A1C checks and management.  Follow up with your ophthalmologist for routine yearly vision exams.  Secondary Diagnosis:	Essential hypertension  Goal:	To maintain a normal blood pressure to prevent heart attack, stroke and renal failure.  Assessment and plan of treatment:	Low sodium and fat diet, continue anti-hypertensive medications, and follow up with primary care physician.  Secondary Diagnosis:	Gastroesophageal reflux disease without esophagitis  Goal:	To prevent acid reflux, heartburn and chest pain.  Assessment and plan of treatment:	Avoid fatty, fried foods, acidic foods such as tomatoes, lime and chocolate. Continue to take your medications as prescribed, exercise daily and weight loss.

## 2018-08-27 NOTE — PROGRESS NOTE ADULT - SUBJECTIVE AND OBJECTIVE BOX
EP Nurse Practitioner note     tele: Pacing     less dyspnea, no palpitations, no syncope, + jaundice    aspirin enteric coated 81 milliGRAM(s) Oral daily  calcitriol   Capsule 0.5 MICROGram(s) Oral two times a day  calcium carbonate    500 mG (Tums) Chewable 2 Tablet(s) Chew three times a day  cholecalciferol 1000 Unit(s) Oral daily  dextrose 40% Gel 15 Gram(s) Oral once PRN  dextrose 5%. 1000 milliLiter(s) IV Continuous <Continuous>  dextrose 50% Injectable 12.5 Gram(s) IV Push once  dextrose 50% Injectable 25 Gram(s) IV Push once  dextrose 50% Injectable 25 Gram(s) IV Push once  furosemide    Tablet 80 milliGRAM(s) Oral two times a day  glucagon  Injectable 1 milliGRAM(s) IntraMuscular once PRN  heparin  Injectable 5000 Unit(s) SubCutaneous every 12 hours  insulin lispro (HumaLOG) corrective regimen sliding scale   SubCutaneous three times a day before meals  insulin lispro (HumaLOG) corrective regimen sliding scale   SubCutaneous at bedtime  levothyroxine 175 MICROGram(s) Oral daily  magnesium oxide 400 milliGRAM(s) Oral three times a day with meals  metoprolol succinate ER 25 milliGRAM(s) Oral daily  pantoprazole    Tablet 40 milliGRAM(s) Oral before breakfast  potassium chloride   Powder 40 milliEquivalent(s) Oral daily                            11.6   6.69  )-----------( 172      ( 26 Aug 2018 06:31 )             34.7       Hemoglobin: 11.6 g/dL (08-26 @ 06:31)  Hemoglobin: 11.7 g/dL (08-25 @ 06:20)  Hemoglobin: 12.1 g/dL (08-24 @ 07:00)  Hemoglobin: 12.4 g/dL (08-23 @ 05:45)  Hemoglobin: 11.9 g/dL (08-22 @ 06:00)      08-26    133<L>  |  85<L>  |  28<H>  ----------------------------<  197<H>  3.4<L>   |  29  |  1.16    Ca    7.4<L>      26 Aug 2018 20:30  Mg     1.8     08-26    TPro  7.0  /  Alb  3.3  /  TBili  7.4<H>  /  DBili  5.1<H>  /  AST  78<H>  /  ALT  41<H>  /  AlkPhos  388<H>  08-26    Creatinine Trend: 1.16<--, 1.14<--, 1.13<--, 1.27<--, 1.34<--, 1.51<--    COAGS:     CARDIAC MARKERS ( 26 Aug 2018 06:31 )  x     / x     / 323 u/L / x     / x      CARDIAC MARKERS ( 24 Aug 2018 07:00 )  x     / x     / 552 u/L / x     / x            T(C): 36.6 (08-26-18 @ 22:21), Max: 36.7 (08-26-18 @ 12:11)  HR: 91 (08-26-18 @ 22:21) (78 - 91)  BP: 113/92 (08-26-18 @ 22:21) (113/92 - 116/85)  RR: 18 (08-26-18 @ 22:21) (18 - 18)  SpO2: 100% (08-26-18 @ 22:21) (99% - 100%)  Wt(kg): --    I&O's Summary    25 Aug 2018 07:01  -  26 Aug 2018 07:00  --------------------------------------------------------  IN: 0 mL / OUT: 2700 mL / NET: -2700 mL    26 Aug 2018 07:01  -  27 Aug 2018 05:43  --------------------------------------------------------  IN: 0 mL / OUT: 2425 mL / NET: -2425 mL        JVP 12  RRR, no murmurs  decreased BS  soft nt/nd  + 1 edema    ASSESSMENT/PLAN: She is a pleasant 63 y/o female PMH severe NICM (LVEF 10-15%) who had a [employer] Biv-ICD implanted at [place], hx DM, HTN, thyroid ca s/p thyroidectomy, recent LHC was also unremarkable.  She was hospitalized last month for decompensated CHF requiring ionotrope assisted diuresis.  ICD interrogation at that time with no events.  She comes to ED with c/o 10 lb weight gain in 1 week, LE edema, orthopnea, c/w acute on chronic systolic HF exacerbation, also with hypocalcemia, MAGNUS, elevated LFTs and elevated CPK.      - cont lasix PO, renal follow up    -GI follow up , +/- BX   -BV-ICD has been optimized. Management of CHF as per cardiology  - daily weights,  - tele stable   D/W Dr Hallman

## 2018-08-27 NOTE — PROGRESS NOTE ADULT - SUBJECTIVE AND OBJECTIVE BOX
S: no chest pain or sob today         aspirin enteric coated 81 milliGRAM(s) Oral daily  calcitriol   Capsule 0.5 MICROGram(s) Oral two times a day  calcium carbonate    500 mG (Tums) Chewable 2 Tablet(s) Chew three times a day  cholecalciferol 1000 Unit(s) Oral daily  dextrose 40% Gel 15 Gram(s) Oral once PRN  dextrose 5%. 1000 milliLiter(s) IV Continuous <Continuous>  dextrose 50% Injectable 12.5 Gram(s) IV Push once  dextrose 50% Injectable 25 Gram(s) IV Push once  dextrose 50% Injectable 25 Gram(s) IV Push once  furosemide    Tablet 80 milliGRAM(s) Oral two times a day  glucagon  Injectable 1 milliGRAM(s) IntraMuscular once PRN  heparin  Injectable 5000 Unit(s) SubCutaneous every 12 hours  insulin lispro (HumaLOG) corrective regimen sliding scale   SubCutaneous three times a day before meals  insulin lispro (HumaLOG) corrective regimen sliding scale   SubCutaneous at bedtime  levothyroxine 175 MICROGram(s) Oral daily  magnesium oxide 400 milliGRAM(s) Oral three times a day with meals  metoprolol succinate ER 25 milliGRAM(s) Oral daily  pantoprazole    Tablet 40 milliGRAM(s) Oral before breakfast  potassium chloride   Powder 40 milliEquivalent(s) Oral daily                            13.2   6.03  )-----------( 156      ( 27 Aug 2018 06:07 )             40.6       08-27    132<L>  |  86<L>  |  28<H>  ----------------------------<  138<H>  4.8   |  25  |  1.06    Ca    7.6<L>      27 Aug 2018 06:07  Phos  5.1     08-27  Mg     1.9     08-27    TPro  7.8  /  Alb  3.4  /  TBili  8.2<H>  /  DBili  5.4<H>  /  AST  113<H>  /  ALT  57<H>  /  AlkPhos  432<H>  08-27      CARDIAC MARKERS ( 27 Aug 2018 06:07 )  x     / x     / 313 u/L / x     / x      CARDIAC MARKERS ( 26 Aug 2018 06:31 )  x     / x     / 323 u/L / x     / x            T(C): 36.7 (08-27-18 @ 06:10), Max: 36.7 (08-26-18 @ 12:11)  HR: 91 (08-27-18 @ 06:10) (78 - 91)  BP: 111/77 (08-27-18 @ 06:10) (111/77 - 114/78)  RR: 19 (08-27-18 @ 06:10) (18 - 19)  SpO2: 100% (08-27-18 @ 06:10) (99% - 100%)  Wt(kg): --    I&O's Summary    26 Aug 2018 07:01  -  27 Aug 2018 07:00  --------------------------------------------------------  IN: 0 mL / OUT: 2425 mL / NET: -2425 mL        Cardiovascular:  S1S2 RRR,   Respiratory: Lungs clear to auscultation, normal effort  Gastrointestinal: Abdomen soft, ND, NT, +BS  Skin: Warm, dry, intact. No rash.  Musculoskeletal: Normal ROM, normal strength  Ext: 2+ le edema BL    DIAGNOSTIC DATA  TELEMETRY:     < from: TTE with Doppler (w/Cont) (06.29.18 @ 10:45) >  CONCLUSIONS:  1. Mitral annular calcification, otherwise normal mitral  valve. Moderate mitral regurgitation.  2. Moderately dilated left atrium.  LA volume index = 42  cc/m2.  3. Moderate left ventricular enlargement.  4. Severe global left ventricular systolic dysfunction.  Endocardial visualization enhanced with intravenous  injection of echo contrast (Definity).  No LV thrombus  seen.  5. Moderate right atrial enlargement.  6. Right ventricular enlargement with decreased right  ventricular systolic function.  A device wire is noted in  the right heart.  7. Normal tricuspid valve.  Severe tricuspid regurgitation.  *** Compared with echocardiogram of 4/26/2018,no  significant changes noted.  ------------------------------------------------------------------------  Confirmed on  6/29/2018 - 14:06:25 by Xander Lafayette, M.D.    < end of copied text >      < from: Cardiac Cath Lab - Adult (05.25.18 @ 11:46) >  HEMODYNAMICS: There is severe left sided failure.  COMPLICATIONS: There were no complications.  DIAGNOSTIC RECOMMENDATIONS: Right heart catheterization demonstrates  elevated PCWP and low cardiac output. Will transfer patient to the CCU for  inotrope assisted diuresis.  Prepared and signed by  Kunal Muller M.D.  Signed 05/29/2018 16:07:27    < end of copied text >    < from: US Abdomen Complete (08.21.18 @ 12:05) >  IMPRESSION:     Cholelithiasis.       Transthoracic Echocardiogram (08.23.18 @ 11:25) >  CONCLUSIONS:  1. Tethered mitral valve leaflets with normal opening.  Moderate-severe mitral regurgitation.  2. Normal left ventricular internal dimensions and wall  thicknesses.  3. Severe global left ventricular systolic dysfunction.  4. Moderate right atrial enlargement.  A device wire is  noted in the right heart.  5. Right ventricular enlargement with decreased right  ventricular systolic function.  6. Normal tricuspid valve.  Severe tricuspid regurgitation.  7. Estimated pulmonary artery systolic pressure equals 24  mm Hg, assuming right atrial pressure equals 10  mm Hg,  consistent with normal pulmonary pressures.  *** Compared with echocardiogram of 6/29/2018, no  significant changes noted.        ASSESSMENT AND PLAN:  She is a pleasant 61 y/o female PMH severe NICM (LVEF 10-15%) who had a [employer]onic Biv-ICD implanted at [place], hx DM, HTN, thyroid ca s/p thyroidectomy, recent LHC at [place] was also unremarkable.  She was hospitalized last month for decompensated CHF requiring ionotrope assisted diuresis.  She comes to ED with c/o 10 lb weight gain in 1 week, LE edema, orthopnea, c/w acute on chronic systolic HF exacerbation, also with hypocalcemia, MAGNUS, elevated LFTs and elevated CPK.      -pt. currently refusing IV lasix  -continue with po lasix to keep O > I  -K repleted - monitor electrolytes  -no need for repeat ischemic evaluation at this time  -follow up GI re: liver biopsy  -repeat TTE to assess MR Kunal Muller MD

## 2018-08-27 NOTE — PROGRESS NOTE ADULT - ATTENDING COMMENTS
Agree with above  still volume overloaded  continue with iv lasix  follow up gi  follow up renal    Kunal Muller MD
EP ATTENDING    Agree with above. No further inpatient EP workup needed.
EP ATTENDING    Agree with above. No further inpatient EP workup needed. Management of severe MR, severe TR and CHF as per cardiology. F/u with GI re hyperbilirubinemia.
Advanced care planning was discussed with patient and family.  Advanced care planning forms were reviewed and discussed.  Risks, benefits and alternatives of gastroenterologic procedures were discussed in detail and all questions were answered.    30 minutes spent.

## 2018-08-27 NOTE — PROGRESS NOTE ADULT - SUBJECTIVE AND OBJECTIVE BOX
INTERVAL HPI/OVERNIGHT EVENTS: No new concerns. I just want to go home today . Was coughing but said it has been there.   Vital Signs Last 24 Hrs  T(C): 36.7 (27 Aug 2018 06:10), Max: 36.7 (26 Aug 2018 12:11)  T(F): 98.1 (27 Aug 2018 06:10), Max: 98.1 (27 Aug 2018 06:10)  HR: 91 (27 Aug 2018 06:10) (78 - 91)  BP: 111/77 (27 Aug 2018 06:10) (111/77 - 114/78)  BP(mean): --  RR: 19 (27 Aug 2018 06:10) (18 - 19)  SpO2: 100% (27 Aug 2018 06:10) (99% - 100%)  I&O's Summary    26 Aug 2018 07:01  -  27 Aug 2018 07:00  --------------------------------------------------------  IN: 0 mL / OUT: 2425 mL / NET: -2425 mL      MEDICATIONS  (STANDING):  aspirin enteric coated 81 milliGRAM(s) Oral daily  calcitriol   Capsule 0.5 MICROGram(s) Oral two times a day  calcium carbonate    500 mG (Tums) Chewable 2 Tablet(s) Chew three times a day  cholecalciferol 1000 Unit(s) Oral daily  dextrose 5%. 1000 milliLiter(s) (50 mL/Hr) IV Continuous <Continuous>  dextrose 50% Injectable 12.5 Gram(s) IV Push once  dextrose 50% Injectable 25 Gram(s) IV Push once  dextrose 50% Injectable 25 Gram(s) IV Push once  furosemide    Tablet 80 milliGRAM(s) Oral two times a day  heparin  Injectable 5000 Unit(s) SubCutaneous every 12 hours  insulin lispro (HumaLOG) corrective regimen sliding scale   SubCutaneous three times a day before meals  insulin lispro (HumaLOG) corrective regimen sliding scale   SubCutaneous at bedtime  levothyroxine 175 MICROGram(s) Oral daily  magnesium oxide 400 milliGRAM(s) Oral three times a day with meals  metoprolol succinate ER 25 milliGRAM(s) Oral daily  pantoprazole    Tablet 40 milliGRAM(s) Oral before breakfast  potassium chloride   Powder 40 milliEquivalent(s) Oral daily    MEDICATIONS  (PRN):  dextrose 40% Gel 15 Gram(s) Oral once PRN Blood Glucose LESS THAN 70 milliGRAM(s)/deciliter  glucagon  Injectable 1 milliGRAM(s) IntraMuscular once PRN Glucose LESS THAN 70 milligrams/deciliter    LABS:                        13.2   6.03  )-----------( 156      ( 27 Aug 2018 06:07 )             40.6     08-27    132<L>  |  86<L>  |  28<H>  ----------------------------<  138<H>  4.8   |  25  |  1.06    Ca    7.6<L>      27 Aug 2018 06:07  Phos  5.1     08-27  Mg     1.9     08-27    TPro  7.8  /  Alb  3.4  /  TBili  8.2<H>  /  DBili  5.4<H>  /  AST  113<H>  /  ALT  57<H>  /  AlkPhos  432<H>  08-27        CAPILLARY BLOOD GLUCOSE      POCT Blood Glucose.: 125 mg/dL (27 Aug 2018 08:22)  POCT Blood Glucose.: 242 mg/dL (26 Aug 2018 20:47)  POCT Blood Glucose.: 139 mg/dL (26 Aug 2018 17:14)  POCT Blood Glucose.: 222 mg/dL (26 Aug 2018 12:10)          REVIEW OF SYSTEMS:  CONSTITUTIONAL: No fever, weight loss, or fatigue  EYES: No eye pain, visual disturbances, or discharge  ENMT:  No difficulty hearing, tinnitus, vertigo; No sinus or throat pain  NECK: No pain or stiffness  RESPIRATORY: cough,  shortness of breath  CARDIOVASCULAR: No chest pain, palpitations, dizziness, but  leg swelling  GASTROINTESTINAL: No abdominal or epigastric pain. No nausea, vomiting, or hematemesis; No diarrhea or constipation. No melena or hematochezia.  GENITOURINARY: No dysuria, frequency, hematuria, or incontinence  NEUROLOGICAL: No headaches, memory loss, loss of strength, numbness, or tremors      Consultant(s) Notes Reviewed:  [x ] YES  [ ] NO    PHYSICAL EXAM:  GENERAL: NAD, well-groomed, well-developed ,not in any distress ,  HEAD:  Atraumatic, Normocephalic  EYES: EOMI, PERRLA, conjunctiva and sclera clear  ENMT: No tonsillar erythema, exudates, or enlargement; Moist mucous membranes, Good dentition, No lesions  NECK: JVD++  NERVOUS SYSTEM:  Alert & Oriented X3, No focal deficit   CHEST/LUNG: Good air entry bilateral with basal  rales, rhonchi, wheezing, or rubs  HEART: Regular rate and rhythm; No murmurs, rubs, or gallops  ABDOMEN: Soft, Nontender, Nondistended; Bowel sounds present  EXTREMITIES:  2+ Peripheral Pulses, No clubbing, cyanosis, but 2+edema  SKIN: No rashes or lesions    Care Discussed with Consultants/Other Providers [ x] YES  [ ] NO

## 2018-08-27 NOTE — CONSULT NOTE ADULT - CONSULT REQUESTED DATE/TIME
21-Aug-2018 13:06
21-Aug-2018 15:17
21-Aug-2018 15:43
27-Aug-2018 11:38
27-Aug-2018 11:55
21-Aug-2018 14:10

## 2018-08-27 NOTE — PROGRESS NOTE ADULT - PROBLEM SELECTOR PLAN 1
- Liver serologies testing and thus far what has returned have been negative   - continue to avoid hepatotoxins  - diurese per cardiology and nephrology   - suspect congestive hepatopathy, however, cannot rule out amyloid  - will need IR transjugular liver biopsy with pressures once cleared by cardiology
- Liver serologies have been negative   - continue to avoid hepatotoxins  - trend lfts/bilirubin; down trending   - diurese per cardiology and nephrology   - suspect congestive hepatopathy, however, cannot rule out amyloid  - will need IR transjugular liver biopsy with pressures once cleared by cardiology, however, pt refusing as she wants to go home
- Liver serologies testing and thus far what has returned have been negative   - continue to avoid hepatotoxins  - lfts/bilirubin down trending with diuresis  - diurese per cardiology and nephrology   - suspect congestive hepatopathy, however, cannot rule out amyloid  - will need IR transjugular liver biopsy with pressures once cleared by cardiology
- suspect congestive hepatopathy, however, cannot rule out amyloid  - Liver serologies testing (KING, AMA, Anti Smooth muscle antibody, Ceruloplasmin, Alpha-1 Antitrypsin)  - Monitor LFTs daily   - avoid hepatotoxins  - US Abdomen reviewed with cholelithiasis; no MRI as pt has AICD   - diurese per cardiology and nephrology   - further recommendations pending liver serologies; may need liver biopsy

## 2018-08-27 NOTE — PROGRESS NOTE ADULT - SUBJECTIVE AND OBJECTIVE BOX
INTERVAL HPI/OVERNIGHT EVENTS:    +icterus  denies n/v/d/c, abdominal pain, melena or brbpr   "i need to go home already"    MEDICATIONS  (STANDING):  aspirin enteric coated 81 milliGRAM(s) Oral daily  calcitriol   Capsule 0.5 MICROGram(s) Oral two times a day  calcium carbonate    500 mG (Tums) Chewable 2 Tablet(s) Chew three times a day  cholecalciferol 1000 Unit(s) Oral daily  dextrose 5%. 1000 milliLiter(s) (50 mL/Hr) IV Continuous <Continuous>  dextrose 50% Injectable 12.5 Gram(s) IV Push once  dextrose 50% Injectable 25 Gram(s) IV Push once  dextrose 50% Injectable 25 Gram(s) IV Push once  furosemide    Tablet 80 milliGRAM(s) Oral two times a day  heparin  Injectable 5000 Unit(s) SubCutaneous every 12 hours  insulin lispro (HumaLOG) corrective regimen sliding scale   SubCutaneous three times a day before meals  insulin lispro (HumaLOG) corrective regimen sliding scale   SubCutaneous at bedtime  levothyroxine 175 MICROGram(s) Oral daily  magnesium oxide 400 milliGRAM(s) Oral three times a day with meals  metoprolol succinate ER 25 milliGRAM(s) Oral daily  pantoprazole    Tablet 40 milliGRAM(s) Oral before breakfast  potassium chloride   Powder 40 milliEquivalent(s) Oral daily    MEDICATIONS  (PRN):  dextrose 40% Gel 15 Gram(s) Oral once PRN Blood Glucose LESS THAN 70 milliGRAM(s)/deciliter  glucagon  Injectable 1 milliGRAM(s) IntraMuscular once PRN Glucose LESS THAN 70 milligrams/deciliter      Allergies    Isordil (Headache)  penicillin (Rash)    Intolerances        Review of Systems:    General:  No wt loss, fevers, chills, night sweats, fatigue   Eyes:  Good vision, no reported pain  ENT:  No sore throat, pain, runny nose, dysphagia  CV:  No pain, palpitations, hypo/hypertension  Resp:  No dyspnea, cough, tachypnea, wheezing  GI:  No pain, No nausea, No vomiting, No diarrhea, No constipation, No weight loss, No fever, No pruritis, No rectal bleeding, No melena, No dysphagia  :  No pain, bleeding, incontinence, nocturia  Muscle:  No pain, weakness  Neuro:  No weakness, tingling, memory problems  Psych:  No fatigue, insomnia, mood problems, depression  Endocrine:  No polyuria, polydypsia, cold/heat intolerance  Heme:  No petechiae, ecchymosis, easy bruisability  Skin:  No rash, tattoos, scars, edema      Vital Signs Last 24 Hrs  T(C): 36.7 (27 Aug 2018 06:10), Max: 36.7 (26 Aug 2018 12:11)  T(F): 98.1 (27 Aug 2018 06:10), Max: 98.1 (27 Aug 2018 06:10)  HR: 91 (27 Aug 2018 06:10) (78 - 91)  BP: 111/77 (27 Aug 2018 06:10) (111/77 - 114/78)  BP(mean): --  RR: 19 (27 Aug 2018 06:10) (18 - 19)  SpO2: 100% (27 Aug 2018 06:10) (99% - 100%)    PHYSICAL EXAM:    Constitutional: NAD  HEENT: EOMI, throat clear; +icterus  Neck: No LAD, supple  Respiratory: CTA and P  Cardiovascular: S1 and S2, RRR, no M  Gastrointestinal: BS+, soft, NT/ND, neg HSM,  Extremities: No peripheral edema, neg clubbing, cyanosis  Vascular: 2+ peripheral pulses  Neurological: A/O x 3, no focal deficits  Psychiatric: Normal mood, normal affect  Skin: No rashes      LABS:                        13.2   6.03  )-----------( 156      ( 27 Aug 2018 06:07 )             40.6     08-27    132<L>  |  86<L>  |  28<H>  ----------------------------<  138<H>  4.8   |  25  |  1.06    Ca    7.6<L>      27 Aug 2018 06:07  Phos  5.1     08-27  Mg     1.9     08-27    TPro  7.8  /  Alb  3.4  /  TBili  8.2<H>  /  DBili  5.4<H>  /  AST  113<H>  /  ALT  57<H>  /  AlkPhos  432<H>  08-27          RADIOLOGY & ADDITIONAL TESTS:

## 2018-08-27 NOTE — DISCHARGE NOTE ADULT - CARE PROVIDER_API CALL
Kunal Muller), Cardiovascular Disease; Internal Medicine; Interventional Cardiology  [address] [facility] [address] [place] [address]  Phone: [phone]  Fax: [phone] Kunal Muller), Cardiovascular Disease; Internal Medicine; Interventional Cardiology  [address] [facility] [address]  [place] [address]  Phone: [phone]  Fax: [phone]    Brian [place]), Internal Medicine; Nephrology  [address] [facility] [address]  [place] [address]  Phone: [phone]  Fax: [phone]    Tiara Rojas), Internal Medicine  [address] [place] [address]  Phone: [phone]  Fax: [phone]

## 2018-08-27 NOTE — DISCHARGE NOTE ADULT - HOSPITAL COURSE
61 y/o female with a PMH NICM with severe LV dysfunction (EF 10%) S/P ICD placement, HTN, HLD, DM, thyroid cancer S/P thyroidectomy, with a recent CCU stay for inotrope assisted diuresis, presents to ED with shortness of breath, lower extremity edema and weight gain secondary to acute on chronic systolic heart failure.    + Acute on chronic systolic heart failure: EF 20%-- s/p  Lasix 80mg IVP BID  + MAGNUS on CKD- renal following (Dr. [place]), likely pre-renal in setting of diuresis, resolved  + Hypervolemic hyponatremia- on free water restriction, monitor Na, improving  + Hypocalcemia- Due to Hypoparathyroidism (Chronic)  on Tums and Calcitriol  + elevated LFT suspect congestive hepatopathy, however, cannot rule out amyloid  + Elevated CPK ( No Need For IVF)   June 2018, Echo: EF 10%. Moderate mitral regurgitation. Moderate left ventricular enlargement. Severe global left ventricular systolic dysfunction. Moderate right atrial enlargement. Right ventricular enlargement with decreased right ventricular systolic function. Severe tricuspid regurgitation.  May 2018, Cardiac cath: There is severe left sided failure. Right heart catheterization demonstrates elevated PCWP and low cardiac output. Will transfer patient to the CCU for  inotrope assisted diuresis.  ----------  EKG: AV paced at 94 bpm, QTc 617  CE x3: Trop 28-->29-->26, CK 1026-->1022-->1013  H/H: 11.4/33.8  Na: 125  BUN/Cr: 51/1.78  Glucose: 188  Ca: 6.3  Bili: 13.2  Alk phos: 420  AST: 80  ProBNP: 2262    8/20 CXR: Cardiomegaly. Clear lungs.  8/21 US abdomen: Cholelithiasis.  8/23 ECHO: EF 20%  1. Tethered mitral valve leaflets with normal opening.  Moderate-severe mitral regurgitation.  2. Normal left ventricular internal dimensions and wall thicknesses.  3. Severe global left ventricular systolic dysfunction.  4. Moderate right atrial enlargement.  A device wire is noted in the right heart.  5. Right ventricular enlargement with decreased right ventricular systolic function.  6. Normal tricuspid valve.  Severe tricuspid regurgitation.  7. Estimated pulmonary artery systolic pressure equals 24mm Hg, assuming right atrial pressure equals 10  mm Hg, consistent with normal pulmonary pressures.  *** Compared with echocardiogram of 6/29/2018, no significant changes noted.    CHF team called - pt refusing bumex gtt - on Torsemide 80mg PO BID per CHF recs    Repeat Echo ordered to assess MR - pt refusing    Pt is refusing to comply with medical management and wants to leave Against medical advice. Called by nurse to evaluate patient who wishes to leave the hospital against medical advice. Patient is A&O x3 and has full capacity to make his or her own medical decisions. Pt was informed of their medical conditions, benefits, and alternatives to treatment as well as the risks of refusing treatment and the seriousness of leaving against medical advice such as the risks of heart attack, stroke, seizure, and even death were fully explained to the patient.  After expressing full understanding, patient then signs out against medical advice witnessed by the nurse.  Attending made aware..

## 2018-08-27 NOTE — DISCHARGE NOTE ADULT - PATIENT PORTAL LINK FT
You can access the [email or web address] Patient Portal, offered by [facility], by registering with the following website: [email or web address]

## 2018-08-27 NOTE — PROGRESS NOTE ADULT - ASSESSMENT
63 y/o F with PMH of CHF(with EF of 10% s/p AICD) with recent CCU stay for inotrope assisted diuresis, DM, HTN, Thyroid cancer s/p thyroidectomy presented with the complaint of weight gain and LE swelling. R/o CHF exacerbation    +Acute on chronic systolic CHF-On IV Lasix 80mg BID   +Hypocalcemia-s/p IV Calcium gluconate in the ED  +Rhabdo-Held of IVF due to being in acute CHF      Problem/Plan - 1:  ·  Problem: Acute on chronic systolic (congestive) heart failure.  Plan: On  Lasix IV 80mg BID .  Clinically much better.   TTE noted ..< from: Transthoracic Echocardiogram (08.23.18 @ 11:25) >  CONCLUSIONS:  1. Tethered mitral valve leaflets with normal opening.  Moderate-severe mitral regurgitation.  2. Normal left ventricular internal dimensions and wall  thicknesses.  3. Severe global left ventricular systolic dysfunction.  4. Moderate right atrial enlargement.  A device wire is  noted in the right heart.  5. Right ventricular enlargement with decreased right  ventricular systolic function.  6. Normal tricuspid valve.  Severe tricuspid regurgitation.  7. Estimated pulmonary artery systolic pressure equals 24  mm Hg, assuming right atrial pressure equals 10  mm Hg,  consistent with normal pulmonary pressures.  *** Compared with echocardiogram of 6/29/2018, no  significant changes noted.    < end of copied text >    Cardiology helping.      Problem/Plan - 2:  ·  Problem: Anemia.  Plan: H&H: 11.4/33.8 with low MCV  Will check full Iron panel.      Problem/Plan - 3:  ·  Problem: MAGNUS (acute kidney injury).  Plan: Renal helping.   Avoid nephrotoxic medications      Problem/Plan - 4:  ·  Problem: Hypocalcemia.  Plan: Ca: IV Calcium.   Continue with Calcitrol, Oscal with D, and Vitamin D3      Problem/Plan - 5:  ·  Problem: Elevated LFTs.  Plan: LFT trending down .  US noted . Likely from Liver congestion . GI following.      Problem/Plan - 6:  Problem: Non-traumatic rhabdomyolysis. Plan: CK trending down.      Problem/Plan - 7:  ·  Problem: Type 2 diabetes mellitus with hyperglycemia, without long-term current use of insulin.  Plan: Sugars in good range.  On insulin sliding scale(humalog)  FS TID at bedtime, HgbA1C in am.      Problem/Plan - 8:  ·  Problem: Essential hypertension.  Plan: Continue with antihypertensive medications   DASH diet recommended.      Problem/Plan - 9:  ·  Problem: Need for prophylactic measure.  Plan: On heparin sq 5000U q12hrs.
63yo F with PMH of CHF(with EF of 10% s/p AICD) with recent CCU stay for inotrope assisted diuresis, DM, HTN, Thyroid cancer s/p thyroidectomy presented with the complaint of weight gain and LE swelling noted to have elevation in bilirubin and lfts
61 y/o F with PMH of CHF(with EF of 10% s/p AICD) with recent CCU stay for inotrope assisted diuresis, DM, HTN, Thyroid cancer s/p thyroidectomy presented with the complaint of weight gain and LE swelling. R/o CHF exacerbation    +Acute on chronic systolic CHF-On IV Lasix 80mg BID   +Hypocalcemia-s/p IV Calcium gluconate in the ED  +Rhabdo-Held of IVF due to being in acute CHF      Problem/Plan - 1:  ·  Problem: Acute on chronic systolic (congestive) heart failure.  Plan: On  Lasix PO 80mg BID .  Clinically much better.   TTE noted ..< from: Transthoracic Echocardiogram (08.23.18 @ 11:25) >  CONCLUSIONS:  1. Tethered mitral valve leaflets with normal opening.  Moderate-severe mitral regurgitation.  2. Normal left ventricular internal dimensions and wall  thicknesses.  3. Severe global left ventricular systolic dysfunction.  4. Moderate right atrial enlargement.  A device wire is  noted in the right heart.  5. Right ventricular enlargement with decreased right  ventricular systolic function.  6. Normal tricuspid valve.  Severe tricuspid regurgitation.  7. Estimated pulmonary artery systolic pressure equals 24  mm Hg, assuming right atrial pressure equals 10  mm Hg,  consistent with normal pulmonary pressures.  *** Compared with echocardiogram of 6/29/2018, no  significant changes noted.    < end of copied text >    Cardiology helping.      Problem/Plan - 2:  ·  Problem: Anemia.  Plan: H&H: 11.4/33.8 with low MCV  Will check full Iron panel.      Problem/Plan - 3:  ·  Problem: MAGNUS (acute kidney injury).  Plan: Renal helping.   Avoid nephrotoxic medications      Problem/Plan - 4:  ·  Problem: Hypocalcemia.  Plan: Ca: IV Calcium.   Continue with Calcitrol, Oscal with D, and Vitamin D3      Problem/Plan - 5:  ·  Problem: Elevated LFTs.  Plan: LFT slightly up .  US noted . Refusing transjugular BX. GI following.      Problem/Plan - 6:  Problem: Non-traumatic rhabdomyolysis. Plan: CK trending down.      Problem/Plan - 7:  ·  Problem: Type 2 diabetes mellitus with hyperglycemia, without long-term current use of insulin.  Plan: Sugars in good range.  On insulin sliding scale(humalog)  FS TID at bedtime, HgbA1C in am.      Problem/Plan - 8:  ·  Problem: Essential hypertension.  Plan: Continue with antihypertensive medications   DASH diet recommended.      Problem/Plan - 9:  ·  Problem: Need for prophylactic measure.  Plan: On heparin sq 5000U q12hrs.
61 y/o F with PMH of CHF(with EF of 10% s/p AICD) with recent CCU stay for inotrope assisted diuresis, DM, HTN, Thyroid cancer s/p thyroidectomy presented with the complaint of weight gain and LE swelling. R/o CHF exacerbation    +Acute on chronic systolic CHF-On IV Lasix 80mg BID   +Hypocalcemia-s/p IV Calcium gluconate in the ED  +Rhabdo-Held of IVF due to being in acute CHF      Problem/Plan - 1:  ·  Problem: Acute on chronic systolic (congestive) heart failure.  Plan: Started on Lasix IV 80mg BID .  Clinically better.   TTE noted ..< from: Transthoracic Echocardiogram (08.23.18 @ 11:25) >  CONCLUSIONS:  1. Tethered mitral valve leaflets with normal opening.  Moderate-severe mitral regurgitation.  2. Normal left ventricular internal dimensions and wall  thicknesses.  3. Severe global left ventricular systolic dysfunction.  4. Moderate right atrial enlargement.  A device wire is  noted in the right heart.  5. Right ventricular enlargement with decreased right  ventricular systolic function.  6. Normal tricuspid valve.  Severe tricuspid regurgitation.  7. Estimated pulmonary artery systolic pressure equals 24  mm Hg, assuming right atrial pressure equals 10  mm Hg,  consistent with normal pulmonary pressures.  *** Compared with echocardiogram of 6/29/2018, no  significant changes noted.    < end of copied text >    Cardiology helping.      Problem/Plan - 2:  ·  Problem: Anemia.  Plan: H&H: 11.4/33.8 with low MCV  Will check full Iron panel.      Problem/Plan - 3:  ·  Problem: MAGNUS (acute kidney injury).  Plan: Renal helping.   Avoid nephrotoxic medications      Problem/Plan - 4:  ·  Problem: Hypocalcemia.  Plan: Ca: IV Calcium.   Continue with Calcitrol, Oscal with D, and Vitamin D3      Problem/Plan - 5:  ·  Problem: Elevated LFTs.  Plan: Trending LFT. US noted . Likely Liver congestion . GI following. May need Liver Bx      Problem/Plan - 6:  Problem: Non-traumatic rhabdomyolysis. Plan: CK trending down.      Problem/Plan - 7:  ·  Problem: Type 2 diabetes mellitus with hyperglycemia, without long-term current use of insulin.  Plan: Sugars in good range.  On insulin sliding scale(humalog)  FS TID at bedtime, HgbA1C in am.      Problem/Plan - 8:  ·  Problem: Essential hypertension.  Plan: Continue with antihypertensive medications   DASH diet recommended.      Problem/Plan - 9:  ·  Problem: Need for prophylactic measure.  Plan: On heparin sq 5000U q12hrs.
61 y/o F with PMH of CHF(with EF of 10% s/p AICD) with recent CCU stay for inotrope assisted diuresis, DM, HTN, Thyroid cancer s/p thyroidectomy presented with the complaint of weight gain and LE swelling. R/o CHF exacerbation    +Acute on chronic systolic CHF-On IV Lasix 80mg BID   +Hypocalcemia-s/p IV Calcium gluconate in the ED  +Rhabdo-Held of IVF due to being in acute CHF      Problem/Plan - 1:  ·  Problem: Acute on chronic systolic (congestive) heart failure.  Plan: Started on Lasix IV 80mg BID . HCTZ 50mg BID .   TTE noted ..< from: Transthoracic Echocardiogram (08.23.18 @ 11:25) >  CONCLUSIONS:  1. Tethered mitral valve leaflets with normal opening.  Moderate-severe mitral regurgitation.  2. Normal left ventricular internal dimensions and wall  thicknesses.  3. Severe global left ventricular systolic dysfunction.  4. Moderate right atrial enlargement.  A device wire is  noted in the right heart.  5. Right ventricular enlargement with decreased right  ventricular systolic function.  6. Normal tricuspid valve.  Severe tricuspid regurgitation.  7. Estimated pulmonary artery systolic pressure equals 24  mm Hg, assuming right atrial pressure equals 10  mm Hg,  consistent with normal pulmonary pressures.  *** Compared with echocardiogram of 6/29/2018, no  significant changes noted.    < end of copied text >    Cardiology helping.      Problem/Plan - 2:  ·  Problem: Anemia.  Plan: H&H: 11.4/33.8 with low MCV  Will check full Iron panel.      Problem/Plan - 3:  ·  Problem: MAGNUS (acute kidney injury).  Plan: Renal helping.   Avoid nephrotoxic medications      Problem/Plan - 4:  ·  Problem: Hypocalcemia.  Plan: Ca: IV Calcium.   Continue with Calcitrol, Oscal with D, and Vitamin D3      Problem/Plan - 5:  ·  Problem: Elevated LFTs.  Plan: Trending LFT. US noted . Likely Liver congestion . GI following. May need Liver Bx      Problem/Plan - 6:  Problem: Non-traumatic rhabdomyolysis. Plan: Watching CK level.      Problem/Plan - 7:  ·  Problem: Type 2 diabetes mellitus with hyperglycemia, without long-term current use of insulin.  Plan: On insulin sliding scale(humalog)  FS TID at bedtime, HgbA1C in am.      Problem/Plan - 8:  ·  Problem: Essential hypertension.  Plan: Continue with antihypertensive medications   DASH diet recommended.      Problem/Plan - 9:  ·  Problem: Need for prophylactic measure.  Plan: On heparin sq 5000U q12hrs.
61 y/o F with PMH of CHF(with EF of 10% s/p AICD) with recent CCU stay for inotrope assisted diuresis, DM, HTN, Thyroid cancer s/p thyroidectomy presented with the complaint of weight gain and LE swelling. R/o CHF exacerbation    +Acute on chronic systolic CHF-On IV Lasix 80mg BID   +Hypocalcemia-s/p IV Calcium gluconate in the ED  +Rhabdo-Held of IVF due to being in acute CHF      Problem/Plan - 1:  ·  Problem: Acute on chronic systolic (congestive) heart failure.  Plan: Started on Lasix IV 80mg BID . HCTZ 50mg BID .   TTE ordered to evaluate LVEF   Cardiology helping.      Problem/Plan - 2:  ·  Problem: Anemia.  Plan: H&H: 11.4/33.8 with low MCV  Will check full Iron panel.      Problem/Plan - 3:  ·  Problem: MAGNUS (acute kidney injury).  Plan: Renal helping.   Avoid nephrotoxic medications      Problem/Plan - 4:  ·  Problem: Hypocalcemia.  Plan: Ca: IV Calcium.   Continue with Calcitrol, Oscal with D, and Vitamin D3      Problem/Plan - 5:  ·  Problem: Elevated LFTs.  Plan: US noted . Likely Liver congestion . GI following. May need Liver Bx      Problem/Plan - 6:  Problem: Non-traumatic rhabdomyolysis. Plan: Watching CK level.      Problem/Plan - 7:  ·  Problem: Type 2 diabetes mellitus with hyperglycemia, without long-term current use of insulin.  Plan: On insulin sliding scale(humalog)  FS TID at bedtime, HgbA1C in am.      Problem/Plan - 8:  ·  Problem: Essential hypertension.  Plan: Continue with antihypertensive medications   DASH diet recommended.      Problem/Plan - 9:  ·  Problem: Need for prophylactic measure.  Plan: On heparin sq 5000U q12hrs.
61 y/o F with PMH of CHF(with EF of 10% s/p AICD) with recent CCU stay for inotrope assisted diuresis, DM, HTN, Thyroid cancer s/p thyroidectomy presented with the complaint of weight gain and LE swelling. R/o CHF exacerbation    +Acute on chronic systolic CHF-On IV Lasix 80mg BID   +Hypocalcemia-s/p IV Calcium gluconate in the ED  +Rhabdo-Held of IVF due to being in acute CHF      Problem/Plan - 1:  ·  Problem: Acute on chronic systolic (congestive) heart failure.  Plan: Started on Lasix IV 80mg BID . HCTZ 50mg BID .   TTE ordered to evaluate LVEF   Cardiology helping.      Problem/Plan - 2:  ·  Problem: Anemia.  Plan: H&H: 11.4/33.8 with low MCV  Will check full Iron panel.      Problem/Plan - 3:  ·  Problem: MAGNUS (acute kidney injury).  Plan: Renal helping.   Avoid nephrotoxic medications      Problem/Plan - 4:  ·  Problem: Hypocalcemia.  Plan: Ca: IV Calcium.   Continue with Calcitrol, Oscal with D, and Vitamin D3  Will check Ionized calcium level, Vitam D-hydroxy, PTH      Problem/Plan - 5:  ·  Problem: Elevated LFTs.  Plan: US noted . Likely Liver congestion . GI following.      Problem/Plan - 6:  Problem: Non-traumatic rhabdomyolysis. Plan: Watching CK level.    Problem/Plan - 7:  ·  Problem: Type 2 diabetes mellitus with hyperglycemia, without long-term current use of insulin.  Plan: On insulin sliding scale(humalog)  FS TID at bedtime, HgbA1C in am.      Problem/Plan - 8:  ·  Problem: Essential hypertension.  Plan: Continue with antihypertensive medications   DASH diet recommended.      Problem/Plan - 9:  ·  Problem: Need for prophylactic measure.  Plan: On heparin sq 5000U q12hrs.
61yo F with PMH of CHF(with EF of 10% s/p AICD) with recent CCU stay for inotrope assisted diuresis, DM, HTN, Thyroid cancer s/p thyroidectomy presented with the complaint of weight gain and LE swelling noted to have elevation in bilirubin and lfts
63yo F with PMH of CHF(with EF of 10% s/p AICD) with recent CCU stay for inotrope assisted diuresis, DM, HTN, Thyroid cancer s/p thyroidectomy presented with the complaint of weight gain and LE swelling noted to have elevation in bilirubin and lfts

## 2018-08-27 NOTE — CONSULT NOTE ADULT - ATTENDING COMMENTS
EP ATTENDING    Agree with above. Her BiV-ICD has been optimized. Management of CHF as per cardiology.
62 year old woman w complex medical history here with ADHF.    Reports travelling for 1 month outside country and noted for about 7-10 days swelling of LE and change in sclerae color.  As of today she has been refusing oral medications, endorses she wants "outpatient doctor" to follow her in the office instead and wants to leave.    ACtive issues  Acute decompensated heart failure  NICM, HFrEF, Stage C-D, NYHA IIIb (EF 20%, LVEDD 6.5cm) s/p CRT 2017  mod-severe MR, severe TR  hx HTN, HLD, DM II  hx of thyroid cancer s/p surgical resection + secondary hypoparathyroidism    RHC 5/25/17: RA 28, W 37, mPA 46, EDP 23, CI 1.2  TTE: LVEDD 6.5cm, wall thickness 0.6cm!!, biv dysfunction  ECG biV pacing rhythm  ADHF readmissions this year: 2 (5/25, 6/18)    JVP 20, clear lungs, edema 2+ up to thighs, soft abdomen  SCr 1.05, tbili 8, direct bili 5.4,     Volume up on exam, I explained to her importance of compliance w medications while inpatient but she refuses to follow suggestions (she is asking for torsemide only); I also explained to here worsening of disease and risk of death but she refused " to have that conversation, because she has had it before"; I also explained need for advanced therapies (it had already been offered during admission in May and she refused) but she categorically said "I do not need that I have looked it up on TV" and refused to have the conversation. She is her own major adversary when it comes to treatment of her medical  illness.    She is not on GDMT due to lack of compliance    - would diurese with bumex drip @ 1, if refusing at least torsemide 80mg po bid while inpatient and monitor for 24-48 hs on oral regimen with CMP monitoring as well  - would need to uptitrate beta blocker once euvolemic  -she would benefit from neurohormonal antagonists however we cannot start them if she does not comply while inpatient or even follow up w us  (I don't see a reason for contraindication for ACEi; spironolactone I would only start it if she decides to stay as her K is >4.5)  - unfortunately she was extremely poor insight and her prognosis is poor.

## 2018-08-29 LAB — HEV AB FLD QL: NEGATIVE — SIGNIFICANT CHANGE UP

## 2018-09-07 ENCOUNTER — EMERGENCY (EMERGENCY)
Facility: HOSPITAL | Age: 63
LOS: 1 days | Discharge: HOME | End: 2018-09-07
Attending: EMERGENCY MEDICINE
Payer: COMMERCIAL

## 2018-09-07 VITALS
TEMPERATURE: 98 F | SYSTOLIC BLOOD PRESSURE: 113 MMHG | OXYGEN SATURATION: 96 % | DIASTOLIC BLOOD PRESSURE: 73 MMHG | RESPIRATION RATE: 18 BRPM | HEART RATE: 90 BPM

## 2018-09-07 VITALS
RESPIRATION RATE: 20 BRPM | DIASTOLIC BLOOD PRESSURE: 68 MMHG | TEMPERATURE: 97 F | HEART RATE: 90 BPM | OXYGEN SATURATION: 99 % | SYSTOLIC BLOOD PRESSURE: 112 MMHG

## 2018-09-07 DIAGNOSIS — Z95.810 PRESENCE OF AUTOMATIC (IMPLANTABLE) CARDIAC DEFIBRILLATOR: Chronic | ICD-10-CM

## 2018-09-07 PROCEDURE — 70486 CT MAXILLOFACIAL W/O DYE: CPT

## 2018-09-07 PROCEDURE — 72170 X-RAY EXAM OF PELVIS: CPT

## 2018-09-07 PROCEDURE — 99284 EMERGENCY DEPT VISIT MOD MDM: CPT

## 2018-09-07 PROCEDURE — 70486 CT MAXILLOFACIAL W/O DYE: CPT | Mod: 26

## 2018-09-07 PROCEDURE — 76377 3D RENDER W/INTRP POSTPROCES: CPT | Mod: 26

## 2018-09-07 PROCEDURE — 76377 3D RENDER W/INTRP POSTPROCES: CPT

## 2018-09-07 PROCEDURE — 70450 CT HEAD/BRAIN W/O DYE: CPT | Mod: 26

## 2018-09-07 PROCEDURE — 73030 X-RAY EXAM OF SHOULDER: CPT | Mod: 26,RT

## 2018-09-07 PROCEDURE — 71045 X-RAY EXAM CHEST 1 VIEW: CPT | Mod: 26

## 2018-09-07 PROCEDURE — 71045 X-RAY EXAM CHEST 1 VIEW: CPT

## 2018-09-07 PROCEDURE — 99284 EMERGENCY DEPT VISIT MOD MDM: CPT | Mod: 25

## 2018-09-07 PROCEDURE — 72170 X-RAY EXAM OF PELVIS: CPT | Mod: 26

## 2018-09-07 PROCEDURE — 73030 X-RAY EXAM OF SHOULDER: CPT

## 2018-09-07 PROCEDURE — 70450 CT HEAD/BRAIN W/O DYE: CPT

## 2018-09-07 RX ORDER — ACETAMINOPHEN 500 MG
975 TABLET ORAL ONCE
Qty: 0 | Refills: 0 | Status: COMPLETED | OUTPATIENT
Start: 2018-09-07 | End: 2018-09-07

## 2018-09-07 RX ADMIN — Medication 975 MILLIGRAM(S): at 16:20

## 2018-09-07 NOTE — ED PROVIDER NOTE - PROGRESS NOTE DETAILS
C- collar cleared, no cervical tender, swelling or lesions. Full ROMs of neck without pain. Neuro- intact. Endorsed to Dr JARED Kemp MD, Facep

## 2018-09-07 NOTE — ED PROVIDER NOTE - ATTENDING CONTRIBUTION TO CARE
62y female pmh ,chf,dm,htn, comes to ed co pain sp fall. Pt was at [employer] office and got up to walk to toilet. Pt foot stuck to floor and pt fell. Co pain rt face shoulder head. No loc,cp,sob,nvdc,diplopia,neck pain. PE WDWN awake alert . NC +ttp rt cheek, eom intact va grossly intact  neck supple chest clear ap cv no rgm, abd soft neruo no focal defects, Extr ya pedal edema full rom le  Law Kemp MD, Facep

## 2018-09-07 NOTE — ED ADULT NURSE NOTE - OBJECTIVE STATEMENT
62 yr old male BIBEMS s/p mechanical trip and fall. per EMS pt fell hitting right side, NO loc, PT ON ASA.  upon assessment pt is a&OX3 clear speech, moving all extremities, strength equal NESS, pt has full ROM of rt shoulder, elbow and wrist, lungs clear ness. skin WDL. no facial welling noted.

## 2018-09-07 NOTE — ED ADULT NURSE NOTE - NSIMPLEMENTINTERV_GEN_ALL_ED
Implemented All Universal Safety Interventions:  [place] to call system. Call bell, personal items and telephone within reach. Instruct patient to call for assistance. Room bathroom lighting operational. Non-slip footwear when patient is off stretcher. Physically safe environment: no spills, clutter or unnecessary equipment. Stretcher in lowest position, wheels locked, appropriate side rails in place.

## 2018-09-07 NOTE — ED PROVIDER NOTE - PHYSICAL EXAMINATION
NAD, VSS, Afebrile, PERRL with full EOMs, + Superficial abrasion on inner upper lip, mild right upper lip swelling, no bleeding, no dental tender, + Right sided submandibular/ tender without obvious swelling, No scalp tender or hematoma, No spinal tender, + Right sided shoulder tender without obvious swelling, full ROMs, No RIB or CVA tender, + Right iliac crust tender on palpation, no hip tender, full ROMs of hips, +B/L pitting edema (chronic, no changes as per pt.), Neuro- intact.

## 2018-09-07 NOTE — ED PROVIDER NOTE - CARE PLAN
Principal Discharge DX:	Head injury  Secondary Diagnosis:	Facial contusion, initial encounter  Secondary Diagnosis:	Musculoskeletal pain

## 2018-09-07 NOTE — ED PROVIDER NOTE - OBJECTIVE STATEMENT
63yo female pt with PMHx of HTN, DM, CHF, was brought to ED by EMS with cervical immobilization c/o head/ face injury and right shoulder pain s/p mechanical fall today. Pt stated she fell on right sided of head/ face and body after her shoe struck on the sticky floor of bathroom at [employer] office. Denies LOC, visual changes or dizziness. Denies N/V. Denies neck or back pain. Denies CP/SOB/ABD pain 63yo female pt with PMHx of HTN, DM, CHF, was brought to ED by EMS with cervical immobilization c/o head/ face injury and right shoulder pain s/p mechanical fall today. Pt stated she fell on right sided of head/ face and body after her shoe struck on the sticky floor of bathroom at [employer] office. Denies LOC, visual changes or dizziness. Denies N/V. Denies neck or back pain. Denies CP/SOB/ABD pain. Denies sensory changes or weakness to extremities. Denies fever, chills or recent sickness. Denies urinary problems.

## 2018-09-13 ENCOUNTER — EMERGENCY (EMERGENCY)
Facility: HOSPITAL | Age: 63
LOS: 0 days | Discharge: HOME | End: 2018-09-13
Attending: EMERGENCY MEDICINE
Payer: COMMERCIAL

## 2018-09-13 VITALS
HEART RATE: 88 BPM | OXYGEN SATURATION: 99 % | SYSTOLIC BLOOD PRESSURE: 106 MMHG | TEMPERATURE: 98 F | RESPIRATION RATE: 18 BRPM | DIASTOLIC BLOOD PRESSURE: 84 MMHG

## 2018-09-13 DIAGNOSIS — Z95.810 PRESENCE OF AUTOMATIC (IMPLANTABLE) CARDIAC DEFIBRILLATOR: Chronic | ICD-10-CM

## 2018-09-13 PROCEDURE — 72100 X-RAY EXAM L-S SPINE 2/3 VWS: CPT | Mod: 26

## 2018-09-13 PROCEDURE — 99283 EMERGENCY DEPT VISIT LOW MDM: CPT

## 2018-09-13 PROCEDURE — 71045 X-RAY EXAM CHEST 1 VIEW: CPT | Mod: 26

## 2018-09-13 RX ORDER — METHOCARBAMOL 500 MG/1
1000 TABLET, FILM COATED ORAL ONCE
Qty: 0 | Refills: 0 | Status: COMPLETED | OUTPATIENT
Start: 2018-09-13 | End: 2018-09-13

## 2018-09-13 RX ORDER — METHOCARBAMOL 500 MG/1
1 TABLET, FILM COATED ORAL
Qty: 15 | Refills: 0 | OUTPATIENT
Start: 2018-09-13 | End: 2018-09-17

## 2018-09-13 RX ORDER — IBUPROFEN 200 MG
600 TABLET ORAL ONCE
Qty: 0 | Refills: 0 | Status: COMPLETED | OUTPATIENT
Start: 2018-09-13 | End: 2018-09-13

## 2018-09-13 RX ORDER — IBUPROFEN 200 MG
1 TABLET ORAL
Qty: 15 | Refills: 0 | OUTPATIENT
Start: 2018-09-13 | End: 2018-09-17

## 2018-09-13 RX ADMIN — Medication 600 MILLIGRAM(S): at 17:33

## 2018-09-13 NOTE — ED PROVIDER NOTE - SKIN, MLM
Skin normal color for race, warm, dry and intact. No evidence of rash. No wounds no ecchymosis to the anterior chest walls bilaterally

## 2018-09-13 NOTE — ED PROVIDER NOTE - ENMT, MLM
Airway patent, Nasal mucosa clear. Mouth with normal mucosa. Throat has no vesicles, no oropharyngeal exudates and uvula is midline. No photophobia bilaterally

## 2018-09-13 NOTE — ED PROVIDER NOTE - CONSTITUTIONAL, MLM
normal... Well appearing, well nourished, awake, alert, oriented to person, place, time/situation and in no apparent distress. Speaking in clear full sentences no nasal flaring no shoulders retractions not holding her head/chest/abdomen, sitting up eating ice chips, appears very comfortable sitting up in the chair in a bright light room.

## 2018-09-13 NOTE — ED ADULT NURSE NOTE - NSIMPLEMENTINTERV_GEN_ALL_ED
Implemented All Universal Safety Interventions:  [place] to call system. Call bell, personal items and telephone within reach. Instruct patient to call for assistance. Room bathroom lighting operational. Non-slip footwear when patient is off stretcher. Physically safe environment: no spills, clutter or unnecessary equipment. Stretcher in lowest position, wheels locked, appropriate side rails in place.

## 2018-09-13 NOTE — ED PROVIDER NOTE - MUSCULOSKELETAL MINIMAL EXAM
tender to palp bilateral trapezius muscles, tender to palp bilateral para lumbar muscles + slight tender to plap bilateral upper anterior ribs/normal range of motion/neck supple/TENDERNESS/motor intact

## 2018-09-13 NOTE — ED PROVIDER NOTE - CARE PLAN
Principal Discharge DX:	Back strain, initial encounter  Secondary Diagnosis:	Contusion of chest wall, initial encounter

## 2018-09-13 NOTE — ED PROVIDER NOTE - NONTENDER LOCATION
right upper quadrant/periumbilical/left costovertebral angle/right lower quadrant/left upper quadrant/left lower quadrant/umbilical/suprapubic/right costovertebral angle

## 2018-09-13 NOTE — ED PROVIDER NOTE - EXTREMITY EXAM
Pt is able actively flex/extend/ abduct/adduct all joints of bilateral upper/lower extremities against resistances/no deformity, pain or tenderness, no restriction of movement

## 2018-09-13 NOTE — ED PROVIDER NOTE - PROGRESS NOTE DETAILS
Pt sts she is feeling muc better now ambulating with normal gaits without assists Pt denies sob, chest pain, headache, dizziness, abd pain, nausea, vomiting. Pt is given and explained all test reports and advised to follow up with Dr. Hall spinal doctor for further managements.
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
## 2018-09-14 DIAGNOSIS — Z79.82 LONG TERM (CURRENT) USE OF ASPIRIN: ICD-10-CM

## 2018-09-14 DIAGNOSIS — M25.532 PAIN IN LEFT WRIST: ICD-10-CM
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
## 2018-09-14 DIAGNOSIS — E11.9 TYPE 2 DIABETES MELLITUS WITHOUT COMPLICATIONS: ICD-10-CM

## 2018-09-14 DIAGNOSIS — Z88.0 ALLERGY STATUS TO PENICILLIN: ICD-10-CM
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
## 2018-09-14 DIAGNOSIS — I10 ESSENTIAL (PRIMARY) HYPERTENSION: ICD-10-CM

## 2018-09-14 DIAGNOSIS — I50.9 HEART FAILURE, UNSPECIFIED: ICD-10-CM

## 2018-09-14 DIAGNOSIS — M54.9 DORSALGIA, UNSPECIFIED: ICD-10-CM

## 2018-09-14 DIAGNOSIS — Z85.850 PERSONAL HISTORY OF MALIGNANT NEOPLASM OF THYROID: ICD-10-CM

## 2018-09-14 DIAGNOSIS — S20.219A CONTUSION OF UNSPECIFIED FRONT WALL OF THORAX, INITIAL ENCOUNTER: ICD-10-CM

## 2018-09-14 DIAGNOSIS — M54.5 LOW BACK PAIN: ICD-10-CM

## 2018-09-14 DIAGNOSIS — S39.012A STRAIN OF MUSCLE, FASCIA AND TENDON OF LOWER BACK, INITIAL ENCOUNTER: ICD-10-CM

## 2018-09-14 DIAGNOSIS — J45.909 UNSPECIFIED ASTHMA, UNCOMPLICATED: ICD-10-CM

## 2018-11-01 ENCOUNTER — APPOINTMENT (OUTPATIENT)
Dept: CARDIOLOGY | Facility: CLINIC | Age: 63
End: 2018-11-01

## 2018-11-06 ENCOUNTER — INPATIENT (INPATIENT)
Facility: HOSPITAL | Age: 63
LOS: 16 days | Discharge: HOME | DRG: 291 | End: 2018-11-23
Attending: INTERNAL MEDICINE | Admitting: INTERNAL MEDICINE
Payer: COMMERCIAL

## 2018-11-06 VITALS — SYSTOLIC BLOOD PRESSURE: 101 MMHG | HEART RATE: 77 BPM | DIASTOLIC BLOOD PRESSURE: 67 MMHG | TEMPERATURE: 98 F

## 2018-11-06 DIAGNOSIS — I50.23 ACUTE ON CHRONIC SYSTOLIC (CONGESTIVE) HEART FAILURE: ICD-10-CM

## 2018-11-06 DIAGNOSIS — I10 ESSENTIAL (PRIMARY) HYPERTENSION: ICD-10-CM

## 2018-11-06 DIAGNOSIS — N17.9 ACUTE KIDNEY FAILURE, UNSPECIFIED: ICD-10-CM

## 2018-11-06 DIAGNOSIS — N93.9 ABNORMAL UTERINE AND VAGINAL BLEEDING, UNSPECIFIED: ICD-10-CM

## 2018-11-06 DIAGNOSIS — E11.9 TYPE 2 DIABETES MELLITUS WITHOUT COMPLICATIONS: ICD-10-CM

## 2018-11-06 DIAGNOSIS — E87.5 HYPERKALEMIA: ICD-10-CM

## 2018-11-06 DIAGNOSIS — E87.1 HYPO-OSMOLALITY AND HYPONATREMIA: ICD-10-CM

## 2018-11-06 DIAGNOSIS — E03.9 HYPOTHYROIDISM, UNSPECIFIED: ICD-10-CM

## 2018-11-06 DIAGNOSIS — Z95.810 PRESENCE OF AUTOMATIC (IMPLANTABLE) CARDIAC DEFIBRILLATOR: Chronic | ICD-10-CM

## 2018-11-06 LAB
ALBUMIN SERPL ELPH-MCNC: 3.4 G/DL — SIGNIFICANT CHANGE UP (ref 3.3–5)
ALBUMIN SERPL ELPH-MCNC: 3.5 G/DL — SIGNIFICANT CHANGE UP (ref 3.3–5)
ALP SERPL-CCNC: 395 U/L — HIGH (ref 40–120)
ALP SERPL-CCNC: 398 U/L — HIGH (ref 40–120)
ALT FLD-CCNC: 36 U/L — SIGNIFICANT CHANGE UP (ref 10–45)
ALT FLD-CCNC: 38 U/L — SIGNIFICANT CHANGE UP (ref 10–45)
ANION GAP SERPL CALC-SCNC: 23 MMOL/L — HIGH (ref 5–17)
ANION GAP SERPL CALC-SCNC: 24 MMOL/L — HIGH (ref 5–17)
ANION GAP SERPL CALC-SCNC: 26 MMOL/L — HIGH (ref 5–17)
APTT BLD: 25.8 SEC — LOW (ref 27.5–36.3)
APTT BLD: 31.4 SEC — SIGNIFICANT CHANGE UP (ref 27.5–36.3)
AST SERPL-CCNC: 120 U/L — HIGH (ref 10–40)
AST SERPL-CCNC: 124 U/L — HIGH (ref 10–40)
BASOPHILS # BLD AUTO: 0 K/UL — SIGNIFICANT CHANGE UP (ref 0–0.2)
BASOPHILS NFR BLD AUTO: 0.2 % — SIGNIFICANT CHANGE UP (ref 0–2)
BILIRUB SERPL-MCNC: 11.3 MG/DL — HIGH (ref 0.2–1.2)
BILIRUB SERPL-MCNC: 11.7 MG/DL — HIGH (ref 0.2–1.2)
BLD GP AB SCN SERPL QL: NEGATIVE — SIGNIFICANT CHANGE UP
BUN SERPL-MCNC: 72 MG/DL — HIGH (ref 7–23)
BUN SERPL-MCNC: 74 MG/DL — HIGH (ref 7–23)
BUN SERPL-MCNC: 75 MG/DL — HIGH (ref 7–23)
CALCIUM SERPL-MCNC: 5.9 MG/DL — CRITICAL LOW (ref 8.4–10.5)
CALCIUM SERPL-MCNC: 6.1 MG/DL — CRITICAL LOW (ref 8.4–10.5)
CALCIUM SERPL-MCNC: 6.2 MG/DL — CRITICAL LOW (ref 8.4–10.5)
CHLORIDE SERPL-SCNC: 73 MMOL/L — LOW (ref 96–108)
CHLORIDE SERPL-SCNC: 75 MMOL/L — LOW (ref 96–108)
CHLORIDE SERPL-SCNC: 77 MMOL/L — LOW (ref 96–108)
CO2 SERPL-SCNC: 17 MMOL/L — LOW (ref 22–31)
CO2 SERPL-SCNC: 22 MMOL/L — SIGNIFICANT CHANGE UP (ref 22–31)
CO2 SERPL-SCNC: 23 MMOL/L — SIGNIFICANT CHANGE UP (ref 22–31)
CREAT SERPL-MCNC: 1.95 MG/DL — HIGH (ref 0.5–1.3)
CREAT SERPL-MCNC: 1.98 MG/DL — HIGH (ref 0.5–1.3)
CREAT SERPL-MCNC: 2.02 MG/DL — HIGH (ref 0.5–1.3)
EOSINOPHIL # BLD AUTO: 0.1 K/UL — SIGNIFICANT CHANGE UP (ref 0–0.5)
EOSINOPHIL NFR BLD AUTO: 0.6 % — SIGNIFICANT CHANGE UP (ref 0–6)
GLUCOSE SERPL-MCNC: 142 MG/DL — HIGH (ref 70–99)
GLUCOSE SERPL-MCNC: 151 MG/DL — HIGH (ref 70–99)
GLUCOSE SERPL-MCNC: 168 MG/DL — HIGH (ref 70–99)
HCT VFR BLD CALC: 39 % — SIGNIFICANT CHANGE UP (ref 34.5–45)
HGB BLD-MCNC: 12.3 G/DL — SIGNIFICANT CHANGE UP (ref 11.5–15.5)
HYPOCHROMIA BLD QL: SLIGHT — SIGNIFICANT CHANGE UP
INR BLD: 3.22 RATIO — HIGH (ref 0.88–1.16)
INR BLD: 3.33 RATIO — HIGH (ref 0.88–1.16)
LG PLATELETS BLD QL AUTO: SLIGHT — SIGNIFICANT CHANGE UP
LYMPHOCYTES # BLD AUTO: 1.1 K/UL — SIGNIFICANT CHANGE UP (ref 1–3.3)
LYMPHOCYTES # BLD AUTO: 11.7 % — LOW (ref 13–44)
MAGNESIUM SERPL-MCNC: 1.8 MG/DL — SIGNIFICANT CHANGE UP (ref 1.6–2.6)
MCHC RBC-ENTMCNC: 22.9 PG — LOW (ref 27–34)
MCHC RBC-ENTMCNC: 31.5 GM/DL — LOW (ref 32–36)
MCV RBC AUTO: 72.5 FL — LOW (ref 80–100)
MONOCYTES # BLD AUTO: 0.8 K/UL — SIGNIFICANT CHANGE UP (ref 0–0.9)
MONOCYTES NFR BLD AUTO: 9.1 % — SIGNIFICANT CHANGE UP (ref 2–14)
NEUTROPHILS # BLD AUTO: 7.2 K/UL — SIGNIFICANT CHANGE UP (ref 1.8–7.4)
NEUTROPHILS NFR BLD AUTO: 78.4 % — HIGH (ref 43–77)
PHOSPHATE SERPL-MCNC: 6.6 MG/DL — HIGH (ref 2.5–4.5)
PLAT MORPH BLD: NORMAL — SIGNIFICANT CHANGE UP
PLATELET # BLD AUTO: 178 K/UL — SIGNIFICANT CHANGE UP (ref 150–400)
POTASSIUM SERPL-MCNC: 4.5 MMOL/L — SIGNIFICANT CHANGE UP (ref 3.5–5.3)
POTASSIUM SERPL-MCNC: 6.1 MMOL/L — HIGH (ref 3.5–5.3)
POTASSIUM SERPL-MCNC: 6.4 MMOL/L — CRITICAL HIGH (ref 3.5–5.3)
POTASSIUM SERPL-SCNC: 4.5 MMOL/L — SIGNIFICANT CHANGE UP (ref 3.5–5.3)
POTASSIUM SERPL-SCNC: 6.1 MMOL/L — HIGH (ref 3.5–5.3)
POTASSIUM SERPL-SCNC: 6.4 MMOL/L — CRITICAL HIGH (ref 3.5–5.3)
PROT SERPL-MCNC: 7.7 G/DL — SIGNIFICANT CHANGE UP (ref 6–8.3)
PROT SERPL-MCNC: 8.1 G/DL — SIGNIFICANT CHANGE UP (ref 6–8.3)
PROTHROM AB SERPL-ACNC: 38.4 SEC — HIGH (ref 10–12.9)
PROTHROM AB SERPL-ACNC: 39.7 SEC — HIGH (ref 10–12.9)
RAPID RVP RESULT: SIGNIFICANT CHANGE UP
RBC # BLD: 5.37 M/UL — HIGH (ref 3.8–5.2)
RBC # FLD: 23.7 % — HIGH (ref 10.3–14.5)
RBC BLD AUTO: ABNORMAL
RH IG SCN BLD-IMP: POSITIVE — SIGNIFICANT CHANGE UP
RH IG SCN BLD-IMP: POSITIVE — SIGNIFICANT CHANGE UP
SODIUM SERPL-SCNC: 116 MMOL/L — CRITICAL LOW (ref 135–145)
SODIUM SERPL-SCNC: 121 MMOL/L — LOW (ref 135–145)
SODIUM SERPL-SCNC: 123 MMOL/L — LOW (ref 135–145)
TARGETS BLD QL SMEAR: SIGNIFICANT CHANGE UP
WBC # BLD: 9.2 K/UL — SIGNIFICANT CHANGE UP (ref 3.8–10.5)
WBC # FLD AUTO: 9.2 K/UL — SIGNIFICANT CHANGE UP (ref 3.8–10.5)

## 2018-11-06 PROCEDURE — 76830 TRANSVAGINAL US NON-OB: CPT | Mod: 26

## 2018-11-06 PROCEDURE — 71045 X-RAY EXAM CHEST 1 VIEW: CPT | Mod: 26

## 2018-11-06 PROCEDURE — 74176 CT ABD & PELVIS W/O CONTRAST: CPT | Mod: 26

## 2018-11-06 PROCEDURE — 76856 US EXAM PELVIC COMPLETE: CPT | Mod: 26,59

## 2018-11-06 PROCEDURE — 93010 ELECTROCARDIOGRAM REPORT: CPT

## 2018-11-06 PROCEDURE — 99223 1ST HOSP IP/OBS HIGH 75: CPT | Mod: GC

## 2018-11-06 PROCEDURE — 99285 EMERGENCY DEPT VISIT HI MDM: CPT

## 2018-11-06 RX ORDER — SODIUM CHLORIDE 9 MG/ML
500 INJECTION INTRAMUSCULAR; INTRAVENOUS; SUBCUTANEOUS ONCE
Qty: 0 | Refills: 0 | Status: COMPLETED | OUTPATIENT
Start: 2018-11-06 | End: 2018-11-06

## 2018-11-06 RX ORDER — INSULIN LISPRO 100/ML
VIAL (ML) SUBCUTANEOUS
Qty: 0 | Refills: 0 | Status: DISCONTINUED | OUTPATIENT
Start: 2018-11-06 | End: 2018-11-08

## 2018-11-06 RX ORDER — LEVOTHYROXINE SODIUM 125 MCG
175 TABLET ORAL DAILY
Qty: 0 | Refills: 0 | Status: DISCONTINUED | OUTPATIENT
Start: 2018-11-06 | End: 2018-11-23

## 2018-11-06 RX ORDER — CALCIUM GLUCONATE 100 MG/ML
1 VIAL (ML) INTRAVENOUS ONCE
Qty: 0 | Refills: 0 | Status: COMPLETED | OUTPATIENT
Start: 2018-11-06 | End: 2018-11-06

## 2018-11-06 RX ORDER — ASPIRIN/CALCIUM CARB/MAGNESIUM 324 MG
81 TABLET ORAL DAILY
Qty: 0 | Refills: 0 | Status: DISCONTINUED | OUTPATIENT
Start: 2018-11-06 | End: 2018-11-23

## 2018-11-06 RX ORDER — CALCITRIOL 0.5 UG/1
0.5 CAPSULE ORAL
Qty: 0 | Refills: 0 | Status: DISCONTINUED | OUTPATIENT
Start: 2018-11-06 | End: 2018-11-11

## 2018-11-06 RX ORDER — MAGNESIUM SULFATE 500 MG/ML
1 VIAL (ML) INJECTION ONCE
Qty: 0 | Refills: 0 | Status: COMPLETED | OUTPATIENT
Start: 2018-11-06 | End: 2018-11-06

## 2018-11-06 RX ORDER — INSULIN LISPRO 100/ML
VIAL (ML) SUBCUTANEOUS AT BEDTIME
Qty: 0 | Refills: 0 | Status: DISCONTINUED | OUTPATIENT
Start: 2018-11-06 | End: 2018-11-08

## 2018-11-06 RX ORDER — INFLUENZA VIRUS VACCINE 15; 15; 15; 15 UG/.5ML; UG/.5ML; UG/.5ML; UG/.5ML
0.5 SUSPENSION INTRAMUSCULAR ONCE
Qty: 0 | Refills: 0 | Status: DISCONTINUED | OUTPATIENT
Start: 2018-11-06 | End: 2018-11-23

## 2018-11-06 RX ORDER — CALCIUM GLUCONATE 100 MG/ML
1 VIAL (ML) INTRAVENOUS ONCE
Qty: 0 | Refills: 0 | Status: DISCONTINUED | OUTPATIENT
Start: 2018-11-06 | End: 2018-11-06

## 2018-11-06 RX ORDER — ASPIRIN/CALCIUM CARB/MAGNESIUM 324 MG
81 TABLET ORAL DAILY
Qty: 0 | Refills: 0 | Status: DISCONTINUED | OUTPATIENT
Start: 2018-11-06 | End: 2018-11-06

## 2018-11-06 RX ORDER — CALCIUM ACETATE 667 MG
667 TABLET ORAL
Qty: 0 | Refills: 0 | Status: DISCONTINUED | OUTPATIENT
Start: 2018-11-06 | End: 2018-11-07

## 2018-11-06 RX ORDER — PHYTONADIONE (VIT K1) 5 MG
5 TABLET ORAL ONCE
Qty: 0 | Refills: 0 | Status: COMPLETED | OUTPATIENT
Start: 2018-11-06 | End: 2018-11-07

## 2018-11-06 RX ORDER — FUROSEMIDE 40 MG
80 TABLET ORAL
Qty: 0 | Refills: 0 | Status: DISCONTINUED | OUTPATIENT
Start: 2018-11-06 | End: 2018-11-07

## 2018-11-06 RX ADMIN — Medication 200 GRAM(S): at 20:09

## 2018-11-06 RX ADMIN — SODIUM CHLORIDE 500 MILLILITER(S): 9 INJECTION INTRAMUSCULAR; INTRAVENOUS; SUBCUTANEOUS at 14:50

## 2018-11-06 NOTE — H&P ADULT - ASSESSMENT
62yo post-menopausal F w/ pmh of CHF, thyroid ca, HTN, DM, daily ASA user,  c/o vaginal bleeding that she noticed this morning. Pt states she woke up feeling fatigued with abdominal pressure and that when she sat down, she noticed that she had bled through her underwear. Pt has not experienced any bleeding episodes like this in the past. she admits to normal urinary and bowel functions and denies any HA, dizziness, SOB, CP, N/V/D, constipation. Pt has no known uterine pathologies and has not seen a GYN in 15-20 years. Currently Pt reports fatigue and daughter states she has been in this state for the past couple weeks since she was discharged from hospital for CHF exacerbation.

## 2018-11-06 NOTE — ED ADULT NURSE REASSESSMENT NOTE - NS ED NURSE REASSESS COMMENT FT1
offered pt repositioning, pt refusing reports feeling okay in the position that she is in currently. family at bedside.

## 2018-11-06 NOTE — ED ADULT NURSE NOTE - OBJECTIVE STATEMENT
Female 63 years old with history of HTN, DM, CHF and Thyroid Cancer, was brought in by EMS for vaginal bleeding with abdominal cramping. Pt reports she was sitting in the couch and felt she was wet with blood. Feels very and sob for the past few days. Denies chest pain, N/V, fever and chills. Female 63 years old with history of HTN, DM, CHF and Thyroid Cancer, was brought in by EMS for vaginal bleeding with abdominal cramping. Pt reports she woke up feeling very weak and noticed that her underwear was soaked with blood. Denies chest pain, N/V, fever and chills. Reports she has not seen [facility] for 15-20 years. Denies nausea, vomiting and urinary symptoms. Sclerae is jaundice. Bilateral lower legs are swollen. Comfort/safety maintained. Daughter at bedside.

## 2018-11-06 NOTE — ED ADULT NURSE REASSESSMENT NOTE - NS ED NURSE REASSESS COMMENT FT1
family at bedside, pt lethargic but arousable. VSS. IV placed under USG by USG team, repeat labs sent. IVF running as ordered

## 2018-11-06 NOTE — CONSULT NOTE ADULT - SUBJECTIVE AND OBJECTIVE BOX
Requesting Physician : Dr. Rosendo Rincon    Reason for Consultation: CHF    HISTORY OF PRESENT ILLNESS:  63 y/o F with PMHx of NICM s/p AICD, HTN, DM, ? medication compliance who was admitted with volume overload and heart failure.  The patient initially presented to the ED with vaginal bleeding.  She was admitted and found to have renal and hepatic failure with INR of 3.  The patient reports a 3 week history of worsening lower extremity swelling, orthopnea, and cough.  She was recently admitted to [facility] where she admitted with heart failure.  During that admission, according to the patient, she was advised to be discharged on home milrinone but refused and went home.  The patient has had several admissions over the past year for heart failure requiring CCU admission and inotropes.  She reports medication non-compliance and missing outpatient appointments in the past. No chest pain or anginal symptoms.       PAST MEDICAL & SURGICAL HISTORY:  Asthma  CHF (congestive heart failure): with EF of 10% s/p AICD  DM (diabetes mellitus)  HTN (hypertension)  Thyroid ca  AICD (automatic cardioverter/defibrillator) present  S/P thyroidectomy          MEDICATIONS:  MEDICATIONS  (STANDING):      Allergies    Isordil (Headache)  penicillin (Rash)    Intolerances        FAMILY HISTORY:  No pertinent family history in first degree relatives    Non-contributary for premature coronary disease or sudden cardiac death    SOCIAL HISTORY:    [x ] Non-smoker  [ ] Smoker  [ ] Alcohol      REVIEW OF SYSTEMS:  [ ]chest pain  [ x ]shortness of breath  [  ]palpitations  [  ]syncope  [ ]near syncope [ ]upper extremity weakness   [ ] lower extremity weakness  [  ]diplopia  [  ]altered mental status   [  ]fevers  [ ]chills [ ]nausea  [ ]vomitting  [  ]dysphagia    [ ]abdominal pain  [ ]melena  [ ]BRBPR    [  ]epistaxis  [  ]rash    [x ]lower extremity edema        [ x] All others negative	  [ ] Unable to obtain    PHYSICAL EXAM:  T(C): 36.3 (11-06-18 @ 14:47), Max: 36.8 (11-06-18 @ 12:01)  HR: 93 (11-06-18 @ 14:47) (77 - 93)  BP: 91/75 (11-06-18 @ 14:47) (91/75 - 101/67)  RR: 17 (11-06-18 @ 14:47) (16 - 17)  SpO2: 100% (11-06-18 @ 14:47) (100% - 100%)  Wt(kg): --  I&O's Summary        	  Lymphatic: No lymphadenopathy ,2+ pitting edema in LE bilaterally   Cardiovascular: Normal S1 S2, RRR, + JVD, No murmurs , Peripheral pulses palpable 2+ bilaterally  Respiratory: Lungs clear to auscultation, normal effort 	  Gastrointestinal:  Soft, Non-tender, + BS	  Skin: No rashes, No ecchymoses, No cyanosis, warm to touch        TELEMETRY: 	    ECG: no ecg performed  	  RADIOLOGY:  OTHER:     DIAGNOSTIC TESTING:  [ ] Echocardiogram: < from: Transthoracic Echocardiogram (08.23.18 @ 11:25) >  CONCLUSIONS:  1. Tethered mitral valve leaflets with normal opening.  Moderate-severe mitral regurgitation.  2. Normal left ventricular internal dimensions and wall  thicknesses.  3. Severe global left ventricular systolic dysfunction.  4. Moderate right atrial enlargement.  A device wire is  noted in the right heart.  5. Right ventricular enlargement with decreased right  ventricular systolic function.  6. Normal tricuspid valve.  Severe tricuspid regurgitation.  7. Estimated pulmonary artery systolic pressure equals 24  mm Hg, assuming right atrial pressure equals 10  mm Hg,  consistent with normal pulmonary pressures.  *** Compared with echocardiogram of 6/29/2018, no  significant changes noted.    < end of copied text >    [ ]  Catheterization: < from: Cardiac Cath Lab - Adult (05.25.18 @ 11:46) >  DIAGNOSTIC RECOMMENDATIONS: Right heart catheterization demonstrates  elevated PCWP and low cardiac output. Will transfer patient to the CCU for  inotrope assisted diuresis.  Prepared and signed by  Kunal Muller M.D.  Signed 05/29/2018 16:07:27  HEMODYNAMIC TABLES    < end of copied text >    [ ] Stress Test:    	  	  LABS:	 	    CARDIAC MARKERS:                              12.3   9.2   )-----------( 178      ( 06 Nov 2018 13:24 )             39.0     11-06    121<L>  |  75<L>  |  74<H>  ----------------------------<  142<H>  6.1<H>   |  23  |  2.02<H>    Ca    6.2<LL>      06 Nov 2018 15:24    TPro  8.1  /  Alb  3.5  /  TBili  11.7<H>  /  DBili  x   /  AST  120<H>  /  ALT  36  /  AlkPhos  398<H>  11-06    proBNP:   Lipid Profile:   HgA1c:   TSH:     ASSESSMENT/PLAN: 	63yFemale with history of CHF s/p AICD, HTN, DM admitted with acute on chronic systolic heart failure.   -Pt. developing multiorgan failure with elevated cr and LFTs  -Pt. is grossly volume overloaded  -Appreciate renal evaluation  -IV diuresis to keep O > I  -check lactate  -check 12 lead ECG  -Recommend transfer to CCU for further monitoring - discussed with CCU fellow  -no urgent ischemic eval needed at this time  -heart failure consultation called - follow up recommendations    Kunal Muller MD

## 2018-11-06 NOTE — CONSULT NOTE ADULT - SUBJECTIVE AND OBJECTIVE BOX
HPI: Ms. Carson is a 63 year-old woman with history of multiple medical issues including hypertension, type 2 diabetes mellitus, thyroid cancer s/p resection, and severe systolic congestive heart failure (EF 10%). She presents today to the [facility] ER with vaginal bleeding that she noticed this morning. Her hemoglobin on presentation was at goal (Hb 12.3), but she was noted to have acute kidney injury, hyperkalemia, and hypocalcemia on ER labwork. Therefore, a renal consultation was requested.    At home, Ms. Carson takes Entresto 24-26, twice per day. She takes Torsemide 40mg twice per day and HCTZ 50mg twice per day. She Takes Ibuprofen as needed. She takes KCl 20meq po daily. She takes Oscal, Phoslo, and Calcitriol.    PAST MEDICAL & SURGICAL HISTORY:  Asthma  CHF (congestive heart failure): with EF of 10% s/p AICD  DM (diabetes mellitus)  HTN (hypertension)  Thyroid ca  AICD (automatic cardioverter/defibrillator) present  S/P thyroidectomy  Hypoparathyroidism    Allergies  Isordil (Headache)  penicillin (Rash)    SOCIAL HISTORY:  Denies ETOh,Smoking,     FAMILY HISTORY:  No pertinent family history in first degree relatives    REVIEW OF SYSTEMS:  CONSTITUTIONAL: No weakness, fevers or chills  EYES/ENT: No visual changes;  No vertigo or throat pain   NECK: No pain or stiffness  RESPIRATORY: No cough, wheezing, hemoptysis; No shortness of breath  CARDIOVASCULAR: No chest pain or palpitations  GASTROINTESTINAL: No abdominal or epigastric pain. No nausea, vomiting, or hematemesis; No diarrhea or constipation. No melena or hematochezia.  GENITOURINARY: No dysuria, frequency or hematuria  NEUROLOGICAL: No numbness or weakness  SKIN: No itching, burning, rashes, or lesions   All other review of systems is negative unless indicated above.    VITAL:  T(C): , Max: 36.8 (11-06-18 @ 12:01)  T(F): , Max: 98.2 (11-06-18 @ 12:01)  HR: 93 (11-06-18 @ 14:47)  BP: 91/75 (11-06-18 @ 14:47)  BP(mean): --  RR: 17 (11-06-18 @ 14:47)  SpO2: 100% (11-06-18 @ 14:47)  Wt(kg): --    PHYSICAL EXAM:  Constitutional: NAD, Alert  HEENT: NCAT, MMM  Neck: Supple, No JVD  Respiratory: CTA-b/l  Cardiovascular: RRR s1s2, no m/r/g  Gastrointestinal: BS+, soft, NT/ND  Extremities: No peripheral edema b/l  Neurological: no focal deficits; strength grossly intact  Psychiatric: Normal mood, normal affect  Back: no CVAT b/l  Skin: No rashes, no nevi    LABS:                        12.3   9.2   )-----------( 178      ( 06 Nov 2018 13:24 )             39.0     Na(121)/K(6.1)/Cl(75)/HCO3(23)/BUN(74)/Cr(2.02)Glu(142)/Ca(6.2)/Mg(--)/PO4(--)    11-06 @ 15:24  Na(116)/K(6.4)/Cl(73)/HCO3(17)/BUN(75)/Cr(1.95)Glu(168)/Ca(6.1)/Mg(--)/PO4(--)    11-06 @ 13:24    (8/2018) - BUN 28;  Creatinine 1.06; K 4.8; Ca 7.6    IMAGING:  < from: CT Abdomen and Pelvis No Cont (11.06.18 @ 17:12) >  When compared with pelvic sonogram of same date:  Unchanged endometrial thickening and endometrial free fluid. 2 high   density foci measuring approximately 1.5 cm may represent small fibroids.  Patchy opacities in the right lower lobe may reflect infection or   aspiration.  Anasarca    ASSESSMENT:  (1)Renal - MAGNUS - Likely prerenally mediated in association with her diuretics, Entresto, and her underlying cardiac disease. GFR now likely around 20-30ml/min.    (2)Hyperkalemia - multifactorial due to factors including MAGNUS, exogenous KCl tabs, and Entresto    (3)Hypocalcemia - due to hypoparathyroidism (s/p parathyroidectomy). Very chronic.    (4)Hyponatremia - unclear exact etiology. Likely chronically with a high level of ADH secretion given her cardiac impairment.    RECOMMEND:  (1)Low-K diet for now  (2)No KCl tabs  (3)Hold Entresto for now  (4)Hold HCTZ for now (given the hyponatremia); would continue Torsemide as taken at home  (5)Continue Phoslo and Oscal as taken at home  (6)Increase Calcitriol from 0.5qd to 0.5bid  (7)No NSAIDs for now  (8)Dose new meds for GFR 20-30ml/min  (9)1 liter free water restriction  (10)No further IVF for now  (11)BMP [id number] for now; goal to increase Na by <0.5meq/L/h. If next serum sodium is below 120, would give Tolvaptan, 15mg po x 1. If next serum sodium is 120-124, would not . If next serum sodium is >124, would give DDAVP 0.1mg x 1.      65 minutes critical care time spent.    Thank you for involving [facility] in this patient's care.    With warm regards,    Brian Henry MD   [facility]  [phone] HPI: Ms. Carson is a 63 year-old woman with history of multiple medical issues including hypertension, type 2 diabetes mellitus, thyroid cancer s/p resection, and severe systolic congestive heart failure (EF 10%). She presents today to the [facility] ER with vaginal bleeding that she noticed this morning. Her hemoglobin on presentation was at goal (Hb 12.3), but her INR was ~3 (no A/C), and she was noted to have acute kidney injury, hyperkalemia, hyponatremia, and hypocalcemia on ER labwork. Therefore, a renal consultation was requested.    At home, Ms. Carson takes Entresto 24-26, twice per day. She takes Torsemide 40mg twice per day and HCTZ 50mg twice per day. She Takes Ibuprofen as needed. She takes KCl 20meq po daily. She takes Oscal, Phoslo, and Calcitriol.    She is s/p discharge from [facility] ~3 weeks ago; she was admitted there with decompensated CHF. She sleeps with 2 pillows; she denies shortness of breath of late. She does attest to a lingering cough for the past several days. She attests to worsening bilateral leg pain of late. She denies past history of acute kidney injury or hyponatremia.     PAST MEDICAL & SURGICAL HISTORY:  Asthma  CHF (congestive heart failure): with EF of 10% s/p AICD  DM (diabetes mellitus)  HTN (hypertension)  Thyroid ca  AICD (automatic cardioverter/defibrillator) present  S/P thyroidectomy  Hypoparathyroidism    Allergies  Isordil (Headache)  penicillin (Rash)    SOCIAL HISTORY:  Denies ETOh,Smoking,     FAMILY HISTORY:  No pertinent family history in first degree relatives    REVIEW OF SYSTEMS:  CONSTITUTIONAL: No weakness, fevers or chills  EYES/ENT: No visual changes;  No vertigo or throat pain   NECK: No pain or stiffness  RESPIRATORY: (+)orthopnea, (+)cough  CARDIOVASCULAR: No chest pain or palpitations  GASTROINTESTINAL: No abdominal or epigastric pain. No nausea, vomiting, or hematemesis; No diarrhea or constipation. No melena or hematochezia.  GENITOURINARY: (+)vaginal bleeding  NEUROLOGICAL: No numbness or weakness; (+)b/l LE pain  SKIN: No itching, burning, rashes, or lesions   All other review of systems is negative unless indicated above.    VITAL:  T(C): , Max: 36.8 (11-06-18 @ 12:01)  T(F): , Max: 98.2 (11-06-18 @ 12:01)  HR: 93 (11-06-18 @ 14:47)  BP: 91/75 (11-06-18 @ 14:47)  RR: 17 (11-06-18 @ 14:47)  SpO2: 100% (11-06-18 @ 14:47)    PHYSICAL EXAM:  Constitutional: NAD, Alert  HEENT: NCAT, MMM  Neck: Supple, (+) large JVD  Respiratory: decreased BS b/l bases  Cardiovascular: RRR s1s2, no m/r/g  Gastrointestinal: BS+, soft, NT, (+)large distension  Extremities: 2-3+ b/l LE edema  Neurological: no focal deficits; strength grossly intact  Back: no CVAT b/l  Skin: No rashes, no nevi    LABS:                        12.3   9.2   )-----------( 178      ( 06 Nov 2018 13:24 )             39.0     Na(121)/K(6.1)/Cl(75)/HCO3(23)/BUN(74)/Cr(2.02)Glu(142)/Ca(6.2)/Mg(--)/PO4(--)    11-06 @ 15:24  Na(116)/K(6.4)/Cl(73)/HCO3(17)/BUN(75)/Cr(1.95)Glu(168)/Ca(6.1)/Mg(--)/PO4(--)    11-06 @ 13:24    (8/2018) - BUN 28;  Creatinine 1.06; K 4.8; Ca 7.6    IMAGING:  < from: CT Abdomen and Pelvis No Cont (11.06.18 @ 17:12) >  When compared with pelvic sonogram of same date:  Unchanged endometrial thickening and endometrial free fluid. 2 high   density foci measuring approximately 1.5 cm may represent small fibroids.  Patchy opacities in the right lower lobe may reflect infection or   aspiration.  Anasarca    ASSESSMENT:  (1)Renal - MAGNUS - Likely prerenally mediated in association with her diuretics, Entresto, and her underlying cardiac disease. GFR now likely around 20-30ml/min.    (2)Hyperkalemia - multifactorial due to factors including MAGNUS, exogenous KCl tabs, and Entresto    (3)Hypocalcemia - due to hypoparathyroidism (s/p parathyroidectomy). Very chronic; does not require overly aggressive treatment.    (4)Hyponatremia - due to decompensated CHF    RECOMMEND:  (1)Low-K diet for now  (2)No KCl tabs  (3)Hold Entresto for now  (4)Hold HCTZ for now (given the hyponatremia); can give Lasix 80mg iv bid for now  (5)Continue Phoslo and Oscal as taken at home  (6)Increase Calcitriol from 0.5qd to 0.5bid  (7)Calcium 1gm IV x 1  (8)No NSAIDs for now  (9)Dose new meds for GFR 20-30ml/min  (10)1 liter free water restriction  (11)No further IVF for now  (12)BMP [id number] for now; goal to increase Na by <0.5meq/L/h. If next serum sodium is below 120, would give Tolvaptan, 15mg po x 1. If next serum sodium is 120-124, would not . If next serum sodium is >124, would give DDAVP 0.1mg x 1.  (13)No inotropes just yet; would wait at least another 12 hours, as adding inotropy could serve to rapidly normalize the sodium    Recommendations discussed both with Medicine attending Dr. Rojas, as well as Cardiology attending Dr. Muller. 65 minutes critical care time spent.    Thank you for involving [facility] in this patient's care.    With warm regards,    Brian Henry MD   [facility]  [phone]

## 2018-11-06 NOTE — ED PROVIDER NOTE - OBJECTIVE STATEMENT
this is a 64yo post-menopausal F w/ pmh of CHF, thyroid ca, HTN, DM, daily ASA user,  c/o vaginal bleeding that she noticed this morning. Pt states she woke up feeling fatigued with abdominal pressure and that when she sat down, she noticed that she had bled through her underwear. Pt has not experienced any bleeding episodes like this in the past. she admits to normal urinary and bowel functions and denies any HA, dizziness, SOB, CP, N/V/D, constipation. Pt has no known uterine pathologies and has not seen a GYN in 15-20 years. Currently Pt is lethargic, and daughter states she has been in this state for the past couple weeks since she was discharged from hospital for CHF exacerbation. this is a 64yo post-menopausal F w/ pmh of CHF, thyroid ca, HTN, DM, daily ASA user,  c/o vaginal bleeding that she noticed this morning. Pt states she woke up feeling fatigued with abdominal pressure and that when she sat down, she noticed that she had bled through her underwear. Pt has not experienced any bleeding episodes like this in the past. she admits to normal urinary and bowel functions and denies any HA, dizziness, SOB, CP, N/V/D, constipation. Pt has no known uterine pathologies and has not seen a GYN in 15-20 years. Currently Pt reports fatigue and daughter states she has been in this state for the past couple weeks since she was discharged from hospital for CHF exacerbation.

## 2018-11-06 NOTE — ED ADULT NURSE NOTE - NSIMPLEMENTINTERV_GEN_ALL_ED
Implemented All Fall Risk Interventions:  [place] to call system. Call bell, personal items and telephone within reach. Instruct patient to call for assistance. Room bathroom lighting operational. Non-slip footwear when patient is off stretcher. Physically safe environment: no spills, clutter or unnecessary equipment. Stretcher in lowest position, wheels locked, appropriate side rails in place. Provide visual cue, wrist band, yellow gown, etc. Monitor gait and stability. Monitor for mental status changes and reorient to person, place, and time. Review medications for side effects contributing to fall risk. Reinforce activity limits and safety measures with patient and family.

## 2018-11-06 NOTE — ED PROVIDER NOTE - MEDICAL DECISION MAKING DETAILS
Caridad: 63 year old female post-menopausal with vaginal bleeding today and feeling of pressure suprapubic. has not seen gyn in years. on ASA, no other AC. will get labs, ivf, u/s, ct. Patient also lightheaded and fatigue.

## 2018-11-06 NOTE — CHART NOTE - NSCHARTNOTEFT_GEN_A_CORE
CCU Accept Note    DELIA SERRANO  63y  Patient is a 63y old  Female who presents with a chief complaint of SOB, Vaginal bleeding and weakness. (06 Nov 2018 19:12)    ====================  HPI & Hospital Course:   HPI:  64yo post-menopausal F w/ pmh of CHF, thyroid ca, HTN, DM, daily ASA user,  c/o vaginal bleeding that she noticed this morning. Pt states she woke up feeling fatigued with abdominal pressure and that when she sat down, she noticed that she had bled through her underwear. Pt has not experienced any bleeding episodes like this in the past. she admits to normal urinary and bowel functions and denies any HA, dizziness, SOB, CP, N/V/D, constipation. Pt has no known uterine pathologies and has not seen a GYN in 15-20 years. Currently Pt reports fatigue and daughter states she has been in this state for the past couple weeks since she was discharged from hospital for CHF exacerbation. (06 Nov 2018 19:12)      Past Medical History:     Past Surgical History:     Home Medications:  calcitriol 0.25 mcg oral capsule: 1 cap(s) orally once a day (06 Nov 2018 20:12)  calcitriol 0.5 mcg oral capsule: 1 cap(s) orally once a day (06 Nov 2018 20:12)  calcium-vitamin D: 1 tab(s) orally 3 times a day (06 Nov 2018 20:12)  levothyroxine 175 mcg (0.175 mg) oral tablet: 1 tab(s) orally once a day (06 Nov 2018 20:12)  magnesium oxide: ***see internal chadwick*** (06 Nov 2018 20:12)  Pepcid: As Needed (06 Nov 2018 20:12)  potassium: 1 tab(s) orally once a day    ***see internal chadwick*** (06 Nov 2018 20:12)  torsemide 20 mg oral tablet: 5 tab(s) orally 2 times a day    ***see internal chadwick*** (06 Nov 2018 20:12)  Tradjenta 5 mg oral tablet: 1 tab(s) orally once a day (06 Nov 2018 20:12)      Current Medications:   MEDICATIONS  (STANDING):  aspirin enteric coated 81 milliGRAM(s) Oral daily  calcitriol   Capsule 0.5 MICROGram(s) Oral two times a day  calcium acetate 667 milliGRAM(s) Oral three times a day with meals  calcium carbonate 1250 mG  + Vitamin D (OsCal 500 + D) 1 Tablet(s) Oral three times a day  furosemide   Injectable 80 milliGRAM(s) IV Push two times a day  influenza   Vaccine 0.5 milliLiter(s) IntraMuscular once  insulin lispro (HumaLOG) corrective regimen sliding scale   SubCutaneous three times a day before meals  insulin lispro (HumaLOG) corrective regimen sliding scale   SubCutaneous at bedtime  levothyroxine 175 MICROGram(s) Oral daily  magnesium sulfate  IVPB 1 Gram(s) IV Intermittent once    MEDICATIONS  (PRN):      Allergies:     Family History:     Social History:     ====================  Vital Signs Last 24 Hrs  T(C): 36.3 (06 Nov 2018 22:20), Max: 36.8 (06 Nov 2018 12:01)  T(F): 97.4 (06 Nov 2018 22:20), Max: 98.2 (06 Nov 2018 12:01)  HR: 90 (06 Nov 2018 22:20) (76 - 93)  BP: 100/66 (06 Nov 2018 22:20) (90/70 - 105/82)  BP(mean): 60 (06 Nov 2018 22:20) (60 - 60)  RR: 15 (06 Nov 2018 22:20) (15 - 17)  SpO2: 100% (06 Nov 2018 21:24) (100% - 100%)    Adult Advanced Hemodynamics Last 24 Hrs  CVP(mm Hg): --  CVP(cm H2O): --  CO: --  CI: --  PA: --  PA(mean): --  PCWP: --  SVR: --  SVRI: --  PVR: --  PVRI: --    Physical Exam:   General: NAD  HEENT: PERRL, EOMI, normal sclera and conjunctiva, no oral lesions  Neck: Supple, no JVD  Lungs: CTA bilaterally  Heart: RRR, normal S1S2, no murmurs/rubs/gallops  Abdomen: Soft, ND/NT  Extremities: 2+ peripheral pulses, no cyanosis/clubbing/edema, full ROM  Skin: Warm, well-perfused  Neuro: A&O x3, no focal deficits      ====================  Labs & Imaging:   CBC Full  -  ( 06 Nov 2018 13:24 )  WBC Count : 9.2 K/uL  Hemoglobin : 12.3 g/dL  Hematocrit : 39.0 %  Platelet Count - Automated : 178 K/uL  Mean Cell Volume : 72.5 fl  Mean Cell Hemoglobin : 22.9 pg  Mean Cell Hemoglobin Concentration : 31.5 gm/dL  Auto Neutrophil # : 7.2 K/uL  Auto Lymphocyte # : 1.1 K/uL  Auto Monocyte # : 0.8 K/uL  Auto Eosinophil # : 0.1 K/uL  Auto Basophil # : 0.0 K/uL  Auto Neutrophil % : 78.4 %  Auto Lymphocyte % : 11.7 %  Auto Monocyte % : 9.1 %  Auto Eosinophil % : 0.6 %  Auto Basophil % : 0.2 %    11-06    123<L>  |  77<L>  |  72<H>  ----------------------------<  151<H>  4.5   |  22  |  1.98<H>    Ca    5.9<LL>      06 Nov 2018 21:13  Phos  6.6     11-06  Mg     1.8     11-06    TPro  8.1  /  Alb  3.5  /  TBili  11.7<H>  /  DBili  x   /  AST  120<H>  /  ALT  36  /  AlkPhos  398<H>  11-06    PT/INR - ( 06 Nov 2018 15:24 )   PT: 38.4 sec;   INR: 3.22 ratio         PTT - ( 06 Nov 2018 15:24 )  PTT:31.4 sec      ====================  Assessment & Plan:   ====================        Lowell Parker MD PGY2 CCU Accept Note    DELIA SERRANO  63y  Patient is a 63y old  Female who presents with a chief complaint of SOB, Vaginal bleeding and weakness. (06 Nov 2018 19:12)    ====================  HPI & Hospital Course:   HPI:  62yo post-menopausal F w/ pmh of CHF, thyroid ca, HTN, DM, daily ASA user,  c/o vaginal bleeding that she noticed this morning. Pt states she woke up feeling fatigued with abdominal pressure and that when she sat down, she noticed that she had bled through her underwear. Pt has not experienced any bleeding episodes like this in the past. she admits to normal urinary and bowel functions and denies any HA, dizziness, SOB, CP, N/V/D, constipation. Pt has no known uterine pathologies and has not seen a GYN in 15-20 years. Currently Pt reports fatigue and daughter states she has been in this state for the past couple weeks since she was discharged from hospital for CHF exacerbation. (06 Nov 2018 19:12)      Past Medical History:     Past Surgical History:     Home Medications:  calcitriol 0.25 mcg oral capsule: 1 cap(s) orally once a day (06 Nov 2018 20:12)  calcitriol 0.5 mcg oral capsule: 1 cap(s) orally once a day (06 Nov 2018 20:12)  calcium-vitamin D: 1 tab(s) orally 3 times a day (06 Nov 2018 20:12)  levothyroxine 175 mcg (0.175 mg) oral tablet: 1 tab(s) orally once a day (06 Nov 2018 20:12)  magnesium oxide: ***see internal chadwick*** (06 Nov 2018 20:12)  Pepcid: As Needed (06 Nov 2018 20:12)  potassium: 1 tab(s) orally once a day    ***see internal chadwick*** (06 Nov 2018 20:12)  torsemide 20 mg oral tablet: 5 tab(s) orally 2 times a day    ***see internal chadwick*** (06 Nov 2018 20:12)  Tradjenta 5 mg oral tablet: 1 tab(s) orally once a day (06 Nov 2018 20:12)      Current Medications:   MEDICATIONS  (STANDING):  aspirin enteric coated 81 milliGRAM(s) Oral daily  calcitriol   Capsule 0.5 MICROGram(s) Oral two times a day  calcium acetate 667 milliGRAM(s) Oral three times a day with meals  calcium carbonate 1250 mG  + Vitamin D (OsCal 500 + D) 1 Tablet(s) Oral three times a day  furosemide   Injectable 80 milliGRAM(s) IV Push two times a day  influenza   Vaccine 0.5 milliLiter(s) IntraMuscular once  insulin lispro (HumaLOG) corrective regimen sliding scale   SubCutaneous three times a day before meals  insulin lispro (HumaLOG) corrective regimen sliding scale   SubCutaneous at bedtime  levothyroxine 175 MICROGram(s) Oral daily  magnesium sulfate  IVPB 1 Gram(s) IV Intermittent once    MEDICATIONS  (PRN):      Allergies:     Family History:     Social History:     ====================  Vital Signs Last 24 Hrs  T(C): 36.3 (06 Nov 2018 22:20), Max: 36.8 (06 Nov 2018 12:01)  T(F): 97.4 (06 Nov 2018 22:20), Max: 98.2 (06 Nov 2018 12:01)  HR: 90 (06 Nov 2018 22:20) (76 - 93)  BP: 100/66 (06 Nov 2018 22:20) (90/70 - 105/82)  BP(mean): 60 (06 Nov 2018 22:20) (60 - 60)  RR: 15 (06 Nov 2018 22:20) (15 - 17)  SpO2: 100% (06 Nov 2018 21:24) (100% - 100%)    Adult Advanced Hemodynamics Last 24 Hrs  CVP(mm Hg): --  CVP(cm H2O): --  CO: --  CI: --  PA: --  PA(mean): --  PCWP: --  SVR: --  SVRI: --  PVR: --  PVRI: --    Physical Exam:   General: NAD  HEENT: PERRL, EOMI, normal sclera and conjunctiva, no oral lesions  Neck: Supple, no JVD  Lungs: CTA bilaterally  Heart: RRR, normal S1S2, no murmurs/rubs/gallops  Abdomen: Soft, ND/NT  Extremities: 2+ peripheral pulses, no cyanosis/clubbing/edema, full ROM  Skin: Warm, well-perfused  Neuro: A&O x3, no focal deficits      ====================  Labs & Imaging:   CBC Full  -  ( 06 Nov 2018 13:24 )  WBC Count : 9.2 K/uL  Hemoglobin : 12.3 g/dL  Hematocrit : 39.0 %  Platelet Count - Automated : 178 K/uL  Mean Cell Volume : 72.5 fl  Mean Cell Hemoglobin : 22.9 pg  Mean Cell Hemoglobin Concentration : 31.5 gm/dL  Auto Neutrophil # : 7.2 K/uL  Auto Lymphocyte # : 1.1 K/uL  Auto Monocyte # : 0.8 K/uL  Auto Eosinophil # : 0.1 K/uL  Auto Basophil # : 0.0 K/uL  Auto Neutrophil % : 78.4 %  Auto Lymphocyte % : 11.7 %  Auto Monocyte % : 9.1 %  Auto Eosinophil % : 0.6 %  Auto Basophil % : 0.2 %    11-06    123<L>  |  77<L>  |  72<H>  ----------------------------<  151<H>  4.5   |  22  |  1.98<H>    Ca    5.9<LL>      06 Nov 2018 21:13  Phos  6.6     11-06  Mg     1.8     11-06    TPro  8.1  /  Alb  3.5  /  TBili  11.7<H>  /  DBili  x   /  AST  120<H>  /  ALT  36  /  AlkPhos  398<H>  11-06    PT/INR - ( 06 Nov 2018 15:24 )   PT: 38.4 sec;   INR: 3.22 ratio         PTT - ( 06 Nov 2018 15:24 )  PTT:31.4 sec      ====================  Assessment & Plan:   ====================  62yo post-menopausal F w/ PMHx of CHFrEF (20% 8/2018), thyroid ca s/p resection, HTN, DM, daily ASA user p/w vaginal bleeding, found to be in ADHF, MAGNUS, w/ severe hyponatremia 2/2 thiazide use    #Neuro     #CV    #Respiratory     #GI    #    #Renal/FEN    #ID    #DVT ppx       Lowell Parker MD PGY2 CCU Accept Note    DELIA SERRANO  63y  Patient is a 63y old  Female who presents with a chief complaint of SOB, Vaginal bleeding and weakness. (06 Nov 2018 19:12)    ====================  HPI & Hospital Course:   HPI:  64yo post-menopausal F w/ pmh of CHF, thyroid ca, HTN, DM, daily ASA user,  c/o vaginal bleeding that she noticed this morning. Pt states she woke up feeling fatigued with abdominal pressure and that when she sat down, she noticed that she had bled through her underwear. Pt has not experienced any bleeding episodes like this in the past. she admits to normal urinary and bowel functions and denies any HA, dizziness, SOB, CP, N/V/D, constipation. Pt has no known uterine pathologies and has not seen a GYN in 15-20 years. Currently Pt reports fatigue and daughter states she has been in this state for the past couple weeks since she was discharged from hospital for CHF exacerbation. (06 Nov 2018 19:12)      Past Medical History:     Past Surgical History:     Home Medications:  calcitriol 0.25 mcg oral capsule: 1 cap(s) orally once a day (06 Nov 2018 20:12)  calcitriol 0.5 mcg oral capsule: 1 cap(s) orally once a day (06 Nov 2018 20:12)  calcium-vitamin D: 1 tab(s) orally 3 times a day (06 Nov 2018 20:12)  levothyroxine 175 mcg (0.175 mg) oral tablet: 1 tab(s) orally once a day (06 Nov 2018 20:12)  magnesium oxide: ***see internal chadwick*** (06 Nov 2018 20:12)  Pepcid: As Needed (06 Nov 2018 20:12)  potassium: 1 tab(s) orally once a day    ***see internal chadwick*** (06 Nov 2018 20:12)  torsemide 20 mg oral tablet: 5 tab(s) orally 2 times a day    ***see internal chadwick*** (06 Nov 2018 20:12)  Tradjenta 5 mg oral tablet: 1 tab(s) orally once a day (06 Nov 2018 20:12)      Current Medications:   MEDICATIONS  (STANDING):  aspirin enteric coated 81 milliGRAM(s) Oral daily  calcitriol   Capsule 0.5 MICROGram(s) Oral two times a day  calcium acetate 667 milliGRAM(s) Oral three times a day with meals  calcium carbonate 1250 mG  + Vitamin D (OsCal 500 + D) 1 Tablet(s) Oral three times a day  furosemide   Injectable 80 milliGRAM(s) IV Push two times a day  influenza   Vaccine 0.5 milliLiter(s) IntraMuscular once  insulin lispro (HumaLOG) corrective regimen sliding scale   SubCutaneous three times a day before meals  insulin lispro (HumaLOG) corrective regimen sliding scale   SubCutaneous at bedtime  levothyroxine 175 MICROGram(s) Oral daily  magnesium sulfate  IVPB 1 Gram(s) IV Intermittent once    MEDICATIONS  (PRN):      Allergies:     Family History:     Social History:     ====================  Vital Signs Last 24 Hrs  T(C): 36.3 (06 Nov 2018 22:20), Max: 36.8 (06 Nov 2018 12:01)  T(F): 97.4 (06 Nov 2018 22:20), Max: 98.2 (06 Nov 2018 12:01)  HR: 90 (06 Nov 2018 22:20) (76 - 93)  BP: 100/66 (06 Nov 2018 22:20) (90/70 - 105/82)  BP(mean): 60 (06 Nov 2018 22:20) (60 - 60)  RR: 15 (06 Nov 2018 22:20) (15 - 17)  SpO2: 100% (06 Nov 2018 21:24) (100% - 100%)    Adult Advanced Hemodynamics Last 24 Hrs  CVP(mm Hg): --  CVP(cm H2O): --  CO: --  CI: --  PA: --  PA(mean): --  PCWP: --  SVR: --  SVRI: --  PVR: --  PVRI: --    Physical Exam:   General: NAD  HEENT: PERRL, EOMI, normal sclera and conjunctiva, no oral lesions  Neck: Supple, no JVD  Lungs: CTA bilaterally  Heart: RRR, normal S1S2, no murmurs/rubs/gallops  Abdomen: Soft, ND/NT  Extremities: 2+ peripheral pulses, no cyanosis/clubbing/edema, full ROM  Skin: Warm, well-perfused  Neuro: A&O x3, no focal deficits      ====================  Labs & Imaging:   CBC Full  -  ( 06 Nov 2018 13:24 )  WBC Count : 9.2 K/uL  Hemoglobin : 12.3 g/dL  Hematocrit : 39.0 %  Platelet Count - Automated : 178 K/uL  Mean Cell Volume : 72.5 fl  Mean Cell Hemoglobin : 22.9 pg  Mean Cell Hemoglobin Concentration : 31.5 gm/dL  Auto Neutrophil # : 7.2 K/uL  Auto Lymphocyte # : 1.1 K/uL  Auto Monocyte # : 0.8 K/uL  Auto Eosinophil # : 0.1 K/uL  Auto Basophil # : 0.0 K/uL  Auto Neutrophil % : 78.4 %  Auto Lymphocyte % : 11.7 %  Auto Monocyte % : 9.1 %  Auto Eosinophil % : 0.6 %  Auto Basophil % : 0.2 %    11-06    123<L>  |  77<L>  |  72<H>  ----------------------------<  151<H>  4.5   |  22  |  1.98<H>    Ca    5.9<LL>      06 Nov 2018 21:13  Phos  6.6     11-06  Mg     1.8     11-06    TPro  8.1  /  Alb  3.5  /  TBili  11.7<H>  /  DBili  x   /  AST  120<H>  /  ALT  36  /  AlkPhos  398<H>  11-06    PT/INR - ( 06 Nov 2018 15:24 )   PT: 38.4 sec;   INR: 3.22 ratio         PTT - ( 06 Nov 2018 15:24 )  PTT:31.4 sec      ====================  Assessment & Plan:   ====================  64yo post-menopausal F w/ PMHx of CHFrEF (mod-severe MR, EF 20% 8/2018), thyroid ca s/p resection, HTN, DM, daily ASA user p/w vaginal bleeding, found to be in ADHF, MAGNUS, w/ severe hyponatremia 2/2 thiazide use    #Neuro   AAO x 4  No acute issues    #CV  CHFrEF (mod-severe MR, EF 20% 8/2018) on torsemide 100 mg BID at home, recently at [facility] for CHF exacerbation  Now presenting w/ ADHF with elevated LFTs, INR, and LE edema  C/w Lasix IV 80 mg BID    Hypotension   BP soft 90/60s, likely 2/2 to low flow state   continue to monitor for now     #Respiratory   Saturating well on RA  Lungs clear on CXR    #GI  No acute issues  Consistent carb diet, low K, phos     #  P/w vaginal bleeding   Hgb stable at this time, trend q 6   Gyn c/s     #Renal/FEN  Severe hyponatremia, intially 116, now 123 after approximately 10 hours   Ideal correction rate 0.5 mEq/hr, exceeding at this time  continue to check BMP q6, consider DDAVP if correctly too quickly but likely would worsening CHF exacerbation   F/u Renal recs   Holding HCTZ    MAGNUS  MAGNUS on CKD, baseline SCr 1, now ~2  Likely prerenal given diuretic use, low flow state   Continue to trend at this time     #ID  No acute issues     #HEME/DVT ppx   SCDs, holding pharmaological ppx in the setting of bleeding,   INR elevated likely congestive form CHF exacerbation, s/p Vit K 5 mg x 1     Lowell Parker MD PGY2 CCU Accept Note    DELIA SERRANO  63y  Patient is a 63y old  Female who presents with a chief complaint of SOB, Vaginal bleeding and weakness. (06 Nov 2018 19:12)    ====================  HPI & Hospital Course:   HPI:  64yo post-menopausal F w/ pmh of CHF, thyroid ca, HTN, DM, daily ASA user,  c/o vaginal bleeding that she noticed this morning. Pt states she woke up feeling fatigued with abdominal pressure and that when she sat down, she noticed that she had bled through her underwear. Pt has not experienced any bleeding episodes like this in the past. she admits to normal urinary and bowel functions and denies any HA, dizziness, SOB, CP, N/V/D, constipation. Pt has no known uterine pathologies and has not seen a GYN in 15-20 years. Currently Pt reports fatigue and daughter states she has been in this state for the past couple weeks since she was discharged from hospital for CHF exacerbation. (06 Nov 2018 19:12)      Past Medical History:     Past Surgical History:     Home Medications:  calcitriol 0.25 mcg oral capsule: 1 cap(s) orally once a day (06 Nov 2018 20:12)  calcitriol 0.5 mcg oral capsule: 1 cap(s) orally once a day (06 Nov 2018 20:12)  calcium-vitamin D: 1 tab(s) orally 3 times a day (06 Nov 2018 20:12)  levothyroxine 175 mcg (0.175 mg) oral tablet: 1 tab(s) orally once a day (06 Nov 2018 20:12)  magnesium oxide: ***see internal chadwick*** (06 Nov 2018 20:12)  Pepcid: As Needed (06 Nov 2018 20:12)  potassium: 1 tab(s) orally once a day    ***see internal chadwick*** (06 Nov 2018 20:12)  torsemide 20 mg oral tablet: 5 tab(s) orally 2 times a day    ***see internal chadwick*** (06 Nov 2018 20:12)  Tradjenta 5 mg oral tablet: 1 tab(s) orally once a day (06 Nov 2018 20:12)      Current Medications:   MEDICATIONS  (STANDING):  aspirin enteric coated 81 milliGRAM(s) Oral daily  calcitriol   Capsule 0.5 MICROGram(s) Oral two times a day  calcium acetate 667 milliGRAM(s) Oral three times a day with meals  calcium carbonate 1250 mG  + Vitamin D (OsCal 500 + D) 1 Tablet(s) Oral three times a day  furosemide   Injectable 80 milliGRAM(s) IV Push two times a day  influenza   Vaccine 0.5 milliLiter(s) IntraMuscular once  insulin lispro (HumaLOG) corrective regimen sliding scale   SubCutaneous three times a day before meals  insulin lispro (HumaLOG) corrective regimen sliding scale   SubCutaneous at bedtime  levothyroxine 175 MICROGram(s) Oral daily  magnesium sulfate  IVPB 1 Gram(s) IV Intermittent once    MEDICATIONS  (PRN):      Allergies:     Family History:     Social History:     ====================  Vital Signs Last 24 Hrs  T(C): 36.3 (06 Nov 2018 22:20), Max: 36.8 (06 Nov 2018 12:01)  T(F): 97.4 (06 Nov 2018 22:20), Max: 98.2 (06 Nov 2018 12:01)  HR: 90 (06 Nov 2018 22:20) (76 - 93)  BP: 100/66 (06 Nov 2018 22:20) (90/70 - 105/82)  BP(mean): 60 (06 Nov 2018 22:20) (60 - 60)  RR: 15 (06 Nov 2018 22:20) (15 - 17)  SpO2: 100% (06 Nov 2018 21:24) (100% - 100%)    Adult Advanced Hemodynamics Last 24 Hrs  CVP(mm Hg): --  CVP(cm H2O): --  CO: --  CI: --  PA: --  PA(mean): --  PCWP: --  SVR: --  SVRI: --  PVR: --  PVRI: --    Physical Exam:   General: NAD  HEENT: PERRL, EOMI, normal sclera and conjunctiva, no oral lesions  Neck: Supple, no JVD  Lungs: CTA bilaterally  Heart: RRR, normal S1S2, no murmurs/rubs/gallops  Abdomen: Soft, ND/NT  Extremities: 2+ peripheral pulses, no cyanosis/clubbing/edema, full ROM  Skin: Warm, well-perfused  Neuro: A&O x3, no focal deficits      ====================  Labs & Imaging:   CBC Full  -  ( 06 Nov 2018 13:24 )  WBC Count : 9.2 K/uL  Hemoglobin : 12.3 g/dL  Hematocrit : 39.0 %  Platelet Count - Automated : 178 K/uL  Mean Cell Volume : 72.5 fl  Mean Cell Hemoglobin : 22.9 pg  Mean Cell Hemoglobin Concentration : 31.5 gm/dL  Auto Neutrophil # : 7.2 K/uL  Auto Lymphocyte # : 1.1 K/uL  Auto Monocyte # : 0.8 K/uL  Auto Eosinophil # : 0.1 K/uL  Auto Basophil # : 0.0 K/uL  Auto Neutrophil % : 78.4 %  Auto Lymphocyte % : 11.7 %  Auto Monocyte % : 9.1 %  Auto Eosinophil % : 0.6 %  Auto Basophil % : 0.2 %    11-06    123<L>  |  77<L>  |  72<H>  ----------------------------<  151<H>  4.5   |  22  |  1.98<H>    Ca    5.9<LL>      06 Nov 2018 21:13  Phos  6.6     11-06  Mg     1.8     11-06    TPro  8.1  /  Alb  3.5  /  TBili  11.7<H>  /  DBili  x   /  AST  120<H>  /  ALT  36  /  AlkPhos  398<H>  11-06    PT/INR - ( 06 Nov 2018 15:24 )   PT: 38.4 sec;   INR: 3.22 ratio         PTT - ( 06 Nov 2018 15:24 )  PTT:31.4 sec      ====================  Assessment & Plan:   ====================  64yo post-menopausal F w/ PMHx of CHFrEF (mod-severe MR, EF 20% 8/2018) s/p ICD, thyroid ca s/p resection, HTN, DM, daily ASA user p/w vaginal bleeding, found to be in ADHF, MAGNUS, w/ severe hyponatremia 2/2 thiazide use    #Neuro   AAO x 4  No acute issues    #CV  CHFrEF (mod-severe MR, EF 20% 8/2018) on torsemide 100 mg BID at home, recently at [facility] for CHF exacerbation  Now presenting w/ ADHF with elevated LFTs, INR, and LE edema  C/w Lasix IV 80 mg BID  Monitor I/Os  Aim net neg    Hypotension   BP soft 90/60s, likely 2/2 to low flow state   continue to monitor for now     #Respiratory   Saturating well on RA  Lungs clear on CXR    #GI  Elevation in INR, transaminitis (roughly similar to LFTs on 8/18)  likely 2/2 congestive hepathoty    Consistent carb diet, low K, phos     #  P/w vaginal bleeding   Hgb stable at this time, trend q 6   Gyn c/s     #Renal/FEN  Severe hyponatremia, intially 116, now 123 after approximately 10 hours   Ideal correction rate 0.5 mEq/hr, exceeding at this time  continue to check BMP q6, consider DDAVP if correctly too quickly but likely would worsening CHF exacerbation   F/u Renal recs   Holding HCTZ    MAGNUS  MAGNUS on CKD, baseline SCr 1, now ~2  Likely prerenal given diuretic use, low flow state   Continue to trend at this time     #ID  No acute issues     #HEME/DVT ppx   SCDs, holding pharmaological ppx in the setting of bleeding,   INR elevated likely congestive form CHF exacerbation, s/p Vit K 5 mg x 1     Lowell Parker MD PGY2 CCU Accept Note    DELIA SERRANO  63y  Patient is a 63y old  Female who presents with a chief complaint of SOB, Vaginal bleeding and weakness. (06 Nov 2018 19:12)    ====================  HPI & Hospital Course:   HPI:  64yo post-menopausal F w/ pmh of CHF, thyroid ca, HTN, DM, daily ASA user,  c/o vaginal bleeding that she noticed this morning. Pt states she woke up feeling fatigued with abdominal pressure and that when she sat down, she noticed that she had bled through her underwear. Pt has not experienced any bleeding episodes like this in the past. she admits to normal urinary and bowel functions and denies any HA, dizziness, SOB, CP, N/V/D, constipation. Pt has no known uterine pathologies and has not seen a GYN in 15-20 years. Currently Pt reports fatigue and daughter states she has been in this state for the past couple weeks since she was discharged from hospital for CHF exacerbation. (06 Nov 2018 19:12)      Past Medical History:     Past Surgical History:     Home Medications:  calcitriol 0.25 mcg oral capsule: 1 cap(s) orally once a day (06 Nov 2018 20:12)  calcitriol 0.5 mcg oral capsule: 1 cap(s) orally once a day (06 Nov 2018 20:12)  calcium-vitamin D: 1 tab(s) orally 3 times a day (06 Nov 2018 20:12)  levothyroxine 175 mcg (0.175 mg) oral tablet: 1 tab(s) orally once a day (06 Nov 2018 20:12)  magnesium oxide: ***see internal chadwick*** (06 Nov 2018 20:12)  Pepcid: As Needed (06 Nov 2018 20:12)  potassium: 1 tab(s) orally once a day    ***see internal chadwick*** (06 Nov 2018 20:12)  torsemide 20 mg oral tablet: 5 tab(s) orally 2 times a day    ***see internal chadwick*** (06 Nov 2018 20:12)  Tradjenta 5 mg oral tablet: 1 tab(s) orally once a day (06 Nov 2018 20:12)      Current Medications:   MEDICATIONS  (STANDING):  aspirin enteric coated 81 milliGRAM(s) Oral daily  calcitriol   Capsule 0.5 MICROGram(s) Oral two times a day  calcium acetate 667 milliGRAM(s) Oral three times a day with meals  calcium carbonate 1250 mG  + Vitamin D (OsCal 500 + D) 1 Tablet(s) Oral three times a day  furosemide   Injectable 80 milliGRAM(s) IV Push two times a day  influenza   Vaccine 0.5 milliLiter(s) IntraMuscular once  insulin lispro (HumaLOG) corrective regimen sliding scale   SubCutaneous three times a day before meals  insulin lispro (HumaLOG) corrective regimen sliding scale   SubCutaneous at bedtime  levothyroxine 175 MICROGram(s) Oral daily  magnesium sulfate  IVPB 1 Gram(s) IV Intermittent once    MEDICATIONS  (PRN):      Allergies:     Family History:     Social History:     ====================  Vital Signs Last 24 Hrs  T(C): 36.3 (06 Nov 2018 22:20), Max: 36.8 (06 Nov 2018 12:01)  T(F): 97.4 (06 Nov 2018 22:20), Max: 98.2 (06 Nov 2018 12:01)  HR: 90 (06 Nov 2018 22:20) (76 - 93)  BP: 100/66 (06 Nov 2018 22:20) (90/70 - 105/82)  BP(mean): 60 (06 Nov 2018 22:20) (60 - 60)  RR: 15 (06 Nov 2018 22:20) (15 - 17)  SpO2: 100% (06 Nov 2018 21:24) (100% - 100%)    Adult Advanced Hemodynamics Last 24 Hrs  CVP(mm Hg): --  CVP(cm H2O): --  CO: --  CI: --  PA: --  PA(mean): --  PCWP: --  SVR: --  SVRI: --  PVR: --  PVRI: --    Physical Exam:   General: NAD  HEENT: PERRL, EOMI, normal sclera and conjunctiva, no oral lesions  Neck: Supple, no JVD  Lungs: CTA bilaterally  Heart: RRR, normal S1S2, no murmurs/rubs/gallops  Abdomen: Soft, ND/NT  Extremities: 2+ peripheral pulses, no cyanosis/clubbing/edema, full ROM  Skin: Warm, well-perfused  Neuro: A&O x3, no focal deficits      ====================  Labs & Imaging:   CBC Full  -  ( 06 Nov 2018 13:24 )  WBC Count : 9.2 K/uL  Hemoglobin : 12.3 g/dL  Hematocrit : 39.0 %  Platelet Count - Automated : 178 K/uL  Mean Cell Volume : 72.5 fl  Mean Cell Hemoglobin : 22.9 pg  Mean Cell Hemoglobin Concentration : 31.5 gm/dL  Auto Neutrophil # : 7.2 K/uL  Auto Lymphocyte # : 1.1 K/uL  Auto Monocyte # : 0.8 K/uL  Auto Eosinophil # : 0.1 K/uL  Auto Basophil # : 0.0 K/uL  Auto Neutrophil % : 78.4 %  Auto Lymphocyte % : 11.7 %  Auto Monocyte % : 9.1 %  Auto Eosinophil % : 0.6 %  Auto Basophil % : 0.2 %    11-06    123<L>  |  77<L>  |  72<H>  ----------------------------<  151<H>  4.5   |  22  |  1.98<H>    Ca    5.9<LL>      06 Nov 2018 21:13  Phos  6.6     11-06  Mg     1.8     11-06    TPro  8.1  /  Alb  3.5  /  TBili  11.7<H>  /  DBili  x   /  AST  120<H>  /  ALT  36  /  AlkPhos  398<H>  11-06    PT/INR - ( 06 Nov 2018 15:24 )   PT: 38.4 sec;   INR: 3.22 ratio         PTT - ( 06 Nov 2018 15:24 )  PTT:31.4 sec      ====================  Assessment & Plan:   ====================  64yo post-menopausal F w/ PMHx of CHFrEF (mod-severe MR, EF 20% 8/2018) s/p ICD, thyroid ca s/p resection, HTN, DM, daily ASA user p/w vaginal bleeding, found to be in ADHF, MAGNUS, w/ severe hyponatremia 2/2 thiazide use    #Neuro   AAO x 4  No acute issues    #CV  CHFrEF (mod-severe MR, EF 20% 8/2018) on torsemide 100 mg BID at home, recently at [facility] for CHF exacerbation  Now presenting w/ ADHF with elevated LFTs, INR, and LE edema  C/w Lasix IV 80 mg BID  Monitor I/Os  Aim net neg    Hypotension   BP soft 90/60s, likely 2/2 to low flow state   continue to monitor for now     #Respiratory   Saturating well on RA  Lungs clear on CXR    #GI  Elevation in INR, transaminitis (roughly similar to LFTs on 8/18)  likely 2/2 congestive hepathoty    Consistent carb diet, low K, phos     #  P/w vaginal bleeding   Hgb stable at this time, trend q 6   Gyn c/s, TVUS showed thickened endometrium, will need endometrial biopsy when INR improved     #Renal/FEN  Severe hyponatremia, intially 116, now 123 after approximately 10 hours   Ideal correction rate 0.5 mEq/hr, exceeding at this time  continue to check BMP q6, consider DDAVP if correctly too quickly but likely would worsening CHF exacerbation   F/u Renal recs   Holding HCTZ    MAGNUS  MAGNUS on CKD, baseline SCr 1, now ~2  Likely prerenal given diuretic use, low flow state   Continue to trend at this time     #ID  No acute issues     #HEME/DVT ppx   SCDs, holding pharmaological ppx in the setting of bleeding,   INR elevated likely congestive form CHF exacerbation, s/p Vit K 5 mg x 1     Lowell Parker MD PGY2 CCU Accept Note    DELIA SERRANO  63y  Patient is a 63y old  Female who presents with a chief complaint of SOB, Vaginal bleeding and weakness. (06 Nov 2018 19:12)    ====================  HPI & Hospital Course:   HPI:  62yo post-menopausal F w/ pmh of CHF, thyroid ca, HTN, DM, daily ASA user,  c/o vaginal bleeding that she noticed this morning. Pt states she woke up feeling fatigued with abdominal pressure and that when she sat down, she noticed that she had bled through her underwear. Pt has not experienced any bleeding episodes like this in the past. she admits to normal urinary and bowel functions and denies any HA, dizziness, SOB, CP, N/V/D, constipation. Pt has no known uterine pathologies and has not seen a GYN in 15-20 years. Currently Pt reports fatigue and daughter states she has been in this state for the past couple weeks since she was discharged from hospital for CHF exacerbation. (06 Nov 2018 19:12)      Past Medical History:     Past Surgical History:     Home Medications:  calcitriol 0.25 mcg oral capsule: 1 cap(s) orally once a day (06 Nov 2018 20:12)  calcitriol 0.5 mcg oral capsule: 1 cap(s) orally once a day (06 Nov 2018 20:12)  calcium-vitamin D: 1 tab(s) orally 3 times a day (06 Nov 2018 20:12)  levothyroxine 175 mcg (0.175 mg) oral tablet: 1 tab(s) orally once a day (06 Nov 2018 20:12)  magnesium oxide: ***see internal chadwick*** (06 Nov 2018 20:12)  Pepcid: As Needed (06 Nov 2018 20:12)  potassium: 1 tab(s) orally once a day    ***see internal chadwick*** (06 Nov 2018 20:12)  torsemide 20 mg oral tablet: 5 tab(s) orally 2 times a day    ***see internal chadwick*** (06 Nov 2018 20:12)  Tradjenta 5 mg oral tablet: 1 tab(s) orally once a day (06 Nov 2018 20:12)      Current Medications:   MEDICATIONS  (STANDING):  aspirin enteric coated 81 milliGRAM(s) Oral daily  calcitriol   Capsule 0.5 MICROGram(s) Oral two times a day  calcium acetate 667 milliGRAM(s) Oral three times a day with meals  calcium carbonate 1250 mG  + Vitamin D (OsCal 500 + D) 1 Tablet(s) Oral three times a day  furosemide   Injectable 80 milliGRAM(s) IV Push two times a day  influenza   Vaccine 0.5 milliLiter(s) IntraMuscular once  insulin lispro (HumaLOG) corrective regimen sliding scale   SubCutaneous three times a day before meals  insulin lispro (HumaLOG) corrective regimen sliding scale   SubCutaneous at bedtime  levothyroxine 175 MICROGram(s) Oral daily  magnesium sulfate  IVPB 1 Gram(s) IV Intermittent once    MEDICATIONS  (PRN):      Allergies:     Family History:     Social History:     ====================  Vital Signs Last 24 Hrs  T(C): 36.3 (06 Nov 2018 22:20), Max: 36.8 (06 Nov 2018 12:01)  T(F): 97.4 (06 Nov 2018 22:20), Max: 98.2 (06 Nov 2018 12:01)  HR: 90 (06 Nov 2018 22:20) (76 - 93)  BP: 100/66 (06 Nov 2018 22:20) (90/70 - 105/82)  BP(mean): 60 (06 Nov 2018 22:20) (60 - 60)  RR: 15 (06 Nov 2018 22:20) (15 - 17)  SpO2: 100% (06 Nov 2018 21:24) (100% - 100%)    Adult Advanced Hemodynamics Last 24 Hrs  CVP(mm Hg): --  CVP(cm H2O): --  CO: --  CI: --  PA: --  PA(mean): --  PCWP: --  SVR: --  SVRI: --  PVR: --  PVRI: --    Physical Exam:   General: NAD  HEENT: PERRL, EOMI, normal sclera and conjunctiva, no oral lesions  Neck: Supple, no JVD  Lungs: CTA bilaterally  Heart: RRR, normal S1S2, no murmurs/rubs/gallops  Abdomen: Soft, ND/NT  Extremities: 2+ peripheral pulses, no cyanosis/clubbing/edema, full ROM  Skin: Warm, well-perfused  Neuro: A&O x3, no focal deficits      ====================  Labs & Imaging:   CBC Full  -  ( 06 Nov 2018 13:24 )  WBC Count : 9.2 K/uL  Hemoglobin : 12.3 g/dL  Hematocrit : 39.0 %  Platelet Count - Automated : 178 K/uL  Mean Cell Volume : 72.5 fl  Mean Cell Hemoglobin : 22.9 pg  Mean Cell Hemoglobin Concentration : 31.5 gm/dL  Auto Neutrophil # : 7.2 K/uL  Auto Lymphocyte # : 1.1 K/uL  Auto Monocyte # : 0.8 K/uL  Auto Eosinophil # : 0.1 K/uL  Auto Basophil # : 0.0 K/uL  Auto Neutrophil % : 78.4 %  Auto Lymphocyte % : 11.7 %  Auto Monocyte % : 9.1 %  Auto Eosinophil % : 0.6 %  Auto Basophil % : 0.2 %    11-06    123<L>  |  77<L>  |  72<H>  ----------------------------<  151<H>  4.5   |  22  |  1.98<H>    Ca    5.9<LL>      06 Nov 2018 21:13  Phos  6.6     11-06  Mg     1.8     11-06    TPro  8.1  /  Alb  3.5  /  TBili  11.7<H>  /  DBili  x   /  AST  120<H>  /  ALT  36  /  AlkPhos  398<H>  11-06    PT/INR - ( 06 Nov 2018 15:24 )   PT: 38.4 sec;   INR: 3.22 ratio         PTT - ( 06 Nov 2018 15:24 )  PTT:31.4 sec      ====================  Assessment & Plan:   ====================  62yo post-menopausal F w/ PMHx of CHFrEF (mod-severe MR, EF 20% 8/2018) s/p ICD, thyroid ca s/p resection, HTN, DM, daily ASA user p/w vaginal bleeding, found to be in ADHF, MAGNUS, w/ severe hyponatremia 2/2 thiazide use    #Neuro   AAO x 4  No acute issues    #CV  CHFrEF (mod-severe MR, EF 20% 8/2018) on torsemide 100 mg BID at home, recently at [facility] for CHF exacerbation  Now presenting w/ ADHF with elevated LFTs, INR, and LE edema  C/w Lasix IV 80 mg BID  Monitor I/Os  Aim net neg    Hypotension   BP soft 90/60s, likely 2/2 to low flow state   continue to monitor for now     #Respiratory   Saturating well on RA  Lungs clear on CXR    #GI  Elevation in INR, transaminitis (roughly similar to LFTs on 8/18)  likely 2/2 congestive hepathoty    Consistent carb diet, low K, phos     #  P/w vaginal bleeding   Hgb stable at this time, trend q 6   Gyn c/s, TVUS showed thickened endometrium, will need endometrial biopsy when INR improved     #Renal/FEN  Severe hyponatremia, intially 116, now 123 after approximately 10 hours   Ideal correction rate 0.5 mEq/hr, exceeding at this time  continue to check BMP q6, consider DDAVP if correctly too quickly but likely would worsening CHF exacerbation   F/u Renal recs   Holding HCTZ    MAGNUS  MAGNUS on CKD, baseline SCr 1, now ~2  Likely prerenal given diuretic use, low flow state   Continue to trend at this time     #ID  No acute issues     #HEME/DVT ppx   SCDs, holding pharmaological ppx in the setting of bleeding,   INR elevated likely congestive form CHF exacerbation, s/p Vit K 5 mg x 1     Lowell Parker MD PGY2    CCU Fellow Addendum    Agree with above. Pt is a 62 yo female w h/o non-ischemic CM (EF 20%, severe MR, severe TR), recently hospitalized at [facility] for decompensated HF, who p/w a chief complaint of vaginal bleeding, found to be volume overloaded on exam with evidence of multi-organ dysfunction in setting of decompensated HF.    Neuro: No acute issues    CV: Volume overloaded on exam. Start Lasix 80 IV BID. Hold Entresto. Strict I/O, daily weights. Will hold off on inotropic support for now as may lead to rapid change in Na level. ASA 81 daily. Check TTE. Heart failure eval in AM.    Pulm: O2 sats stable    GI: Elevated AST, ALT and INR likely due to congestive hepatopathy. Vit K given. Monitor labs.    Renal: MAGUNS on CKD likely cardiorenal. Monitor kidney function with diuresis. Marked hypoNa on admission. Renal input appreciated. Monitor levels closely.    GYN: Needs endometrial biopsy when not coagulopathic.    Eliazar Schulz MD  Cardiology Fellow  p: [phone]  [id number] CCU Accept Note    DELIA SERRANO  63y  Patient is a 63y old  Female who presents with a chief complaint of SOB, Vaginal bleeding and weakness. (06 Nov 2018 19:12)    ====================  HPI & Hospital Course:   HPI:  64yo post-menopausal F w/ pmh of CHF, thyroid ca, HTN, DM, daily ASA user,  c/o vaginal bleeding that she noticed this morning. Pt states she woke up feeling fatigued with abdominal pressure and that when she sat down, she noticed that she had bled through her underwear. Pt has not experienced any bleeding episodes like this in the past. she admits to normal urinary and bowel functions and denies any HA, dizziness, SOB, CP, N/V/D, constipation. Pt has no known uterine pathologies and has not seen a GYN in 15-20 years. Currently Pt reports fatigue and daughter states she has been in this state for the past couple weeks since she was discharged from hospital for CHF exacerbation. (06 Nov 2018 19:12)      Past Medical History:     Past Surgical History:     Home Medications:  calcitriol 0.25 mcg oral capsule: 1 cap(s) orally once a day (06 Nov 2018 20:12)  calcitriol 0.5 mcg oral capsule: 1 cap(s) orally once a day (06 Nov 2018 20:12)  calcium-vitamin D: 1 tab(s) orally 3 times a day (06 Nov 2018 20:12)  levothyroxine 175 mcg (0.175 mg) oral tablet: 1 tab(s) orally once a day (06 Nov 2018 20:12)  magnesium oxide: ***see internal chadwick*** (06 Nov 2018 20:12)  Pepcid: As Needed (06 Nov 2018 20:12)  potassium: 1 tab(s) orally once a day    ***see internal chadwick*** (06 Nov 2018 20:12)  torsemide 20 mg oral tablet: 5 tab(s) orally 2 times a day    ***see internal chadwick*** (06 Nov 2018 20:12)  Tradjenta 5 mg oral tablet: 1 tab(s) orally once a day (06 Nov 2018 20:12)      Current Medications:   MEDICATIONS  (STANDING):  aspirin enteric coated 81 milliGRAM(s) Oral daily  calcitriol   Capsule 0.5 MICROGram(s) Oral two times a day  calcium acetate 667 milliGRAM(s) Oral three times a day with meals  calcium carbonate 1250 mG  + Vitamin D (OsCal 500 + D) 1 Tablet(s) Oral three times a day  furosemide   Injectable 80 milliGRAM(s) IV Push two times a day  influenza   Vaccine 0.5 milliLiter(s) IntraMuscular once  insulin lispro (HumaLOG) corrective regimen sliding scale   SubCutaneous three times a day before meals  insulin lispro (HumaLOG) corrective regimen sliding scale   SubCutaneous at bedtime  levothyroxine 175 MICROGram(s) Oral daily  magnesium sulfate  IVPB 1 Gram(s) IV Intermittent once    MEDICATIONS  (PRN):      Allergies:     Family History:     Social History:     ====================  Vital Signs Last 24 Hrs  T(C): 36.3 (06 Nov 2018 22:20), Max: 36.8 (06 Nov 2018 12:01)  T(F): 97.4 (06 Nov 2018 22:20), Max: 98.2 (06 Nov 2018 12:01)  HR: 90 (06 Nov 2018 22:20) (76 - 93)  BP: 100/66 (06 Nov 2018 22:20) (90/70 - 105/82)  BP(mean): 60 (06 Nov 2018 22:20) (60 - 60)  RR: 15 (06 Nov 2018 22:20) (15 - 17)  SpO2: 100% (06 Nov 2018 21:24) (100% - 100%)    Adult Advanced Hemodynamics Last 24 Hrs  CVP(mm Hg): --  CVP(cm H2O): --  CO: --  CI: --  PA: --  PA(mean): --  PCWP: --  SVR: --  SVRI: --  PVR: --  PVRI: --    Physical Exam:   General: NAD  HEENT: PERRL, EOMI, normal sclera and conjunctiva, no oral lesions  Neck: Supple, + JVD   Lungs: CTA bilaterally  Heart: RRR, normal S1S2, no murmurs/rubs/gallops  Abdomen: Soft, ND/NT  Extremities: 2+ peripheral pulses, pitting edema b/l LE   Skin: Warm, well-perfused  Neuro: A&O x3, no focal deficits      ====================  Labs & Imaging:   CBC Full  -  ( 06 Nov 2018 13:24 )  WBC Count : 9.2 K/uL  Hemoglobin : 12.3 g/dL  Hematocrit : 39.0 %  Platelet Count - Automated : 178 K/uL  Mean Cell Volume : 72.5 fl  Mean Cell Hemoglobin : 22.9 pg  Mean Cell Hemoglobin Concentration : 31.5 gm/dL  Auto Neutrophil # : 7.2 K/uL  Auto Lymphocyte # : 1.1 K/uL  Auto Monocyte # : 0.8 K/uL  Auto Eosinophil # : 0.1 K/uL  Auto Basophil # : 0.0 K/uL  Auto Neutrophil % : 78.4 %  Auto Lymphocyte % : 11.7 %  Auto Monocyte % : 9.1 %  Auto Eosinophil % : 0.6 %  Auto Basophil % : 0.2 %    11-06    123<L>  |  77<L>  |  72<H>  ----------------------------<  151<H>  4.5   |  22  |  1.98<H>    Ca    5.9<LL>      06 Nov 2018 21:13  Phos  6.6     11-06  Mg     1.8     11-06    TPro  8.1  /  Alb  3.5  /  TBili  11.7<H>  /  DBili  x   /  AST  120<H>  /  ALT  36  /  AlkPhos  398<H>  11-06    PT/INR - ( 06 Nov 2018 15:24 )   PT: 38.4 sec;   INR: 3.22 ratio         PTT - ( 06 Nov 2018 15:24 )  PTT:31.4 sec      ====================  Assessment & Plan:   ====================  64yo post-menopausal F w/ PMHx of CHFrEF (mod-severe MR, EF 20% 8/2018) s/p ICD, thyroid ca s/p resection, HTN, DM, daily ASA user p/w vaginal bleeding, found to be in ADHF, MAGNUS, w/ severe hyponatremia 2/2 thiazide use    #Neuro   AAO x 4  No acute issues    #CV  CHFrEF (mod-severe MR, EF 20% 8/2018) on torsemide 100 mg BID at home, recently at [facility] for CHF exacerbation  Now presenting w/ ADHF with elevated LFTs, INR, and LE edema  C/w Lasix IV 80 mg BID  HF c/s, f/u recs  Monitor I/Os  Aim net neg    Hypotension   BP soft 90/60s, likely 2/2 to low flow state   continue to monitor for now     #Respiratory   Saturating well on RA  Lungs clear on CXR    #GI  Elevation in INR, transaminitis (roughly similar to LFTs on 8/18)  likely 2/2 congestive hepatopathy   Consistent carb diet, low K, phos     #  P/w vaginal bleeding   Hgb stable at this time, trend q 6   Gyn c/s, TVUS showed thickened endometrium, will need endometrial biopsy when INR improved     #Renal/FEN  Severe hyponatremia, intially 116, now 123 after approximately 10 hours   Ideal correction rate 0.5 mEq/hr, exceeding at this time  continue to check BMP q6, consider DDAVP if correctly too quickly but likely would worsening CHF exacerbation   F/u Renal recs   Holding HCTZ    MAGNUS  MAGNUS on CKD, baseline SCr 1, now ~2  Likely prerenal given diuretic use, low flow state   Continue to trend at this time     #ID  No acute issues     #HEME/DVT ppx   SCDs, holding pharmaological ppx in the setting of bleeding,   INR elevated likely congestive form CHF exacerbation, s/p Vit K 5 mg x 1     Lowell Parker MD PGY2    CCU Fellow Addendum    Agree with above. Pt is a 62 yo female w h/o non-ischemic CM (EF 20%, severe MR, severe TR), recently hospitalized at [facility] for decompensated HF, who p/w a chief complaint of vaginal bleeding, found to be volume overloaded on exam with evidence of multi-organ dysfunction in setting of decompensated HF.    Neuro: No acute issues    CV: Volume overloaded on exam. Start Lasix 80 IV BID. Hold Entresto. Strict I/O, daily weights. Will hold off on inotropic support for now as may lead to rapid change in Na level. ASA 81 daily. Check TTE. Heart failure eval in AM.    Pulm: O2 sats stable    GI: Elevated AST, ALT and INR likely due to congestive hepatopathy. Vit K given. Monitor labs.    Renal: MAGNUS on CKD likely cardiorenal. Monitor kidney function with diuresis. Marked hypoNa on admission. Renal input appreciated. Monitor levels closely.    GYN: Needs endometrial biopsy when not coagulopathic.    Elizaar Schulz MD  Cardiology Fellow  p: [phone] [id number] CCU Accept Note    DELIA SERRANO  63y  Patient is a 63y old  Female who presents with a chief complaint of SOB, Vaginal bleeding and weakness. (06 Nov 2018 19:12)    ====================  HPI & Hospital Course:   HPI:  62yo post-menopausal F w/ pmh of CHF, thyroid ca, HTN, DM, daily ASA user,  c/o vaginal bleeding that she noticed this morning. Pt states she woke up feeling fatigued with abdominal pressure and that when she sat down, she noticed that she had bled through her underwear. Pt has not experienced any bleeding episodes like this in the past. she admits to normal urinary and bowel functions and denies any HA, dizziness, SOB, CP, N/V/D, constipation. Pt has no known uterine pathologies and has not seen a GYN in 15-20 years. Currently Pt reports fatigue and daughter states she has been in this state for the past couple weeks since she was discharged from hospital for CHF exacerbation. (06 Nov 2018 19:12)      Past Medical History:     Past Surgical History:     Home Medications:  calcitriol 0.25 mcg oral capsule: 1 cap(s) orally once a day (06 Nov 2018 20:12)  calcitriol 0.5 mcg oral capsule: 1 cap(s) orally once a day (06 Nov 2018 20:12)  calcium-vitamin D: 1 tab(s) orally 3 times a day (06 Nov 2018 20:12)  levothyroxine 175 mcg (0.175 mg) oral tablet: 1 tab(s) orally once a day (06 Nov 2018 20:12)  magnesium oxide: ***see internal chadwick*** (06 Nov 2018 20:12)  Pepcid: As Needed (06 Nov 2018 20:12)  potassium: 1 tab(s) orally once a day    ***see internal chadwick*** (06 Nov 2018 20:12)  torsemide 20 mg oral tablet: 5 tab(s) orally 2 times a day    ***see internal chadwick*** (06 Nov 2018 20:12)  Tradjenta 5 mg oral tablet: 1 tab(s) orally once a day (06 Nov 2018 20:12)      Current Medications:   MEDICATIONS  (STANDING):  aspirin enteric coated 81 milliGRAM(s) Oral daily  calcitriol   Capsule 0.5 MICROGram(s) Oral two times a day  calcium acetate 667 milliGRAM(s) Oral three times a day with meals  calcium carbonate 1250 mG  + Vitamin D (OsCal 500 + D) 1 Tablet(s) Oral three times a day  furosemide   Injectable 80 milliGRAM(s) IV Push two times a day  influenza   Vaccine 0.5 milliLiter(s) IntraMuscular once  insulin lispro (HumaLOG) corrective regimen sliding scale   SubCutaneous three times a day before meals  insulin lispro (HumaLOG) corrective regimen sliding scale   SubCutaneous at bedtime  levothyroxine 175 MICROGram(s) Oral daily  magnesium sulfate  IVPB 1 Gram(s) IV Intermittent once    MEDICATIONS  (PRN):      Allergies:     Family History:     Social History:     ====================  Vital Signs Last 24 Hrs  T(C): 36.3 (06 Nov 2018 22:20), Max: 36.8 (06 Nov 2018 12:01)  T(F): 97.4 (06 Nov 2018 22:20), Max: 98.2 (06 Nov 2018 12:01)  HR: 90 (06 Nov 2018 22:20) (76 - 93)  BP: 100/66 (06 Nov 2018 22:20) (90/70 - 105/82)  BP(mean): 60 (06 Nov 2018 22:20) (60 - 60)  RR: 15 (06 Nov 2018 22:20) (15 - 17)  SpO2: 100% (06 Nov 2018 21:24) (100% - 100%)    Adult Advanced Hemodynamics Last 24 Hrs  CVP(mm Hg): --  CVP(cm H2O): --  CO: --  CI: --  PA: --  PA(mean): --  PCWP: --  SVR: --  SVRI: --  PVR: --  PVRI: --    Physical Exam:   General: NAD  HEENT: PERRL, EOMI, normal sclera and conjunctiva, no oral lesions  Neck: Supple, + JVD   Lungs: CTA bilaterally  Heart: RRR, normal S1S2, no murmurs/rubs/gallops  Abdomen: Soft, ND/NT  Extremities: 2+ peripheral pulses, pitting edema b/l LE   Skin: Warm, well-perfused  Neuro: A&O x3, no focal deficits      ====================  Labs & Imaging:   CBC Full  -  ( 06 Nov 2018 13:24 )  WBC Count : 9.2 K/uL  Hemoglobin : 12.3 g/dL  Hematocrit : 39.0 %  Platelet Count - Automated : 178 K/uL  Mean Cell Volume : 72.5 fl  Mean Cell Hemoglobin : 22.9 pg  Mean Cell Hemoglobin Concentration : 31.5 gm/dL  Auto Neutrophil # : 7.2 K/uL  Auto Lymphocyte # : 1.1 K/uL  Auto Monocyte # : 0.8 K/uL  Auto Eosinophil # : 0.1 K/uL  Auto Basophil # : 0.0 K/uL  Auto Neutrophil % : 78.4 %  Auto Lymphocyte % : 11.7 %  Auto Monocyte % : 9.1 %  Auto Eosinophil % : 0.6 %  Auto Basophil % : 0.2 %    11-06    123<L>  |  77<L>  |  72<H>  ----------------------------<  151<H>  4.5   |  22  |  1.98<H>    Ca    5.9<LL>      06 Nov 2018 21:13  Phos  6.6     11-06  Mg     1.8     11-06    TPro  8.1  /  Alb  3.5  /  TBili  11.7<H>  /  DBili  x   /  AST  120<H>  /  ALT  36  /  AlkPhos  398<H>  11-06    PT/INR - ( 06 Nov 2018 15:24 )   PT: 38.4 sec;   INR: 3.22 ratio         PTT - ( 06 Nov 2018 15:24 )  PTT:31.4 sec      ====================  Assessment & Plan:   ====================  62yo post-menopausal F w/ PMHx of CHFrEF (mod-severe MR, EF 20% 8/2018) s/p ICD, thyroid ca s/p resection, HTN, DM, daily ASA user p/w vaginal bleeding, found to be in ADHF, MAGNUS, w/ severe hyponatremia 2/2 thiazide use    #Neuro   AAO x 4  No acute issues    #CV  CHFrEF (mod-severe MR, EF 20% 8/2018) on torsemide 100 mg BID at home, recently at [facility] for CHF exacerbation was reportable told to me d/c on milrinone but pt refused   Now presenting w/ ADHF with elevated LFTs, INR, and LE edema  C/w Lasix IV 80 mg BID  HF c/s, f/u recs  Monitor I/Os  Aim net neg    Hypotension   BP soft 90/60s, likely 2/2 to low flow state   continue to monitor for now     #Respiratory   Saturating well on RA  Lungs clear on CXR    #GI  Elevation in INR, transaminitis (roughly similar to LFTs on 8/18)  likely 2/2 congestive hepatopathy   Consistent carb diet, low K, phos     #  P/w vaginal bleeding   Hgb stable at this time, trend q 6   Gyn c/s, TVUS showed thickened endometrium, will need endometrial biopsy when INR improved     #Renal/FEN  Severe hyponatremia, intially 116, now 123 after approximately 10 hours   Ideal correction rate 0.5 mEq/hr, exceeding at this time  continue to check BMP q6, consider DDAVP if correctly too quickly but likely would worsening CHF exacerbation   F/u Renal recs   Holding HCTZ    MAGNUS  MAGNUS on CKD, baseline SCr 1, now ~2  Likely prerenal given diuretic use, low flow state   Continue to trend at this time     #ID  No acute issues     #HEME/DVT ppx   SCDs, holding pharmaological ppx in the setting of bleeding,   INR elevated likely congestive form CHF exacerbation, s/p Vit K 5 mg x 1     Lowell Parker MD PGY2    CCU Fellow Addendum    Agree with above. Pt is a 64 yo female w h/o non-ischemic CM (EF 20%, severe MR, severe TR), recently hospitalized at [facility] for decompensated HF, who p/w a chief complaint of vaginal bleeding, found to be volume overloaded on exam with evidence of multi-organ dysfunction in setting of decompensated HF.    Neuro: No acute issues    CV: Volume overloaded on exam. Start Lasix 80 IV BID. Hold Entresto. Strict I/O, daily weights. Will hold off on inotropic support for now as may lead to rapid change in Na level. ASA 81 daily. Check TTE. Heart failure eval in AM.    Pulm: O2 sats stable    GI: Elevated AST, ALT and INR likely due to congestive hepatopathy. Vit K given. Monitor labs.    Renal: MAGNUS on CKD likely cardiorenal. Monitor kidney function with diuresis. Marked hypoNa on admission. Renal input appreciated. Monitor levels closely.    GYN: Needs endometrial biopsy when not coagulopathic.    Eliazar Schulz MD  Cardiology Fellow  p: [phone]

## 2018-11-07 DIAGNOSIS — R57.0 CARDIOGENIC SHOCK: ICD-10-CM

## 2018-11-07 DIAGNOSIS — R74.8 ABNORMAL LEVELS OF OTHER SERUM ENZYMES: ICD-10-CM

## 2018-11-07 DIAGNOSIS — N95.0 POSTMENOPAUSAL BLEEDING: ICD-10-CM

## 2018-11-07 DIAGNOSIS — N39.0 URINARY TRACT INFECTION, SITE NOT SPECIFIED: ICD-10-CM

## 2018-11-07 LAB
ALBUMIN SERPL ELPH-MCNC: 2.9 G/DL — LOW (ref 3.3–5)
ALBUMIN SERPL ELPH-MCNC: 3.1 G/DL — LOW (ref 3.3–5)
ALP SERPL-CCNC: 336 U/L — HIGH (ref 40–120)
ALP SERPL-CCNC: 364 U/L — HIGH (ref 40–120)
ALT FLD-CCNC: 30 U/L — SIGNIFICANT CHANGE UP (ref 10–45)
ALT FLD-CCNC: 32 U/L — SIGNIFICANT CHANGE UP (ref 10–45)
ANION GAP SERPL CALC-SCNC: 21 MMOL/L — HIGH (ref 5–17)
ANION GAP SERPL CALC-SCNC: 22 MMOL/L — HIGH (ref 5–17)
APPEARANCE UR: ABNORMAL
APTT BLD: 31.9 SEC — SIGNIFICANT CHANGE UP (ref 27.5–36.3)
AST SERPL-CCNC: 62 U/L — HIGH (ref 10–40)
AST SERPL-CCNC: 63 U/L — HIGH (ref 10–40)
BASOPHILS # BLD AUTO: 0.1 K/UL — SIGNIFICANT CHANGE UP (ref 0–0.2)
BASOPHILS NFR BLD AUTO: 1.1 % — SIGNIFICANT CHANGE UP (ref 0–2)
BILIRUB SERPL-MCNC: 10.3 MG/DL — HIGH (ref 0.2–1.2)
BILIRUB SERPL-MCNC: 10.8 MG/DL — HIGH (ref 0.2–1.2)
BILIRUB UR-MCNC: ABNORMAL
BUN SERPL-MCNC: 74 MG/DL — HIGH (ref 7–23)
BUN SERPL-MCNC: 76 MG/DL — HIGH (ref 7–23)
CALCIUM SERPL-MCNC: 5.9 MG/DL — CRITICAL LOW (ref 8.4–10.5)
CALCIUM SERPL-MCNC: 6 MG/DL — CRITICAL LOW (ref 8.4–10.5)
CHLORIDE SERPL-SCNC: 79 MMOL/L — LOW (ref 96–108)
CHLORIDE SERPL-SCNC: 80 MMOL/L — LOW (ref 96–108)
CO2 SERPL-SCNC: 21 MMOL/L — LOW (ref 22–31)
CO2 SERPL-SCNC: 22 MMOL/L — SIGNIFICANT CHANGE UP (ref 22–31)
COLOR SPEC: ABNORMAL
CREAT SERPL-MCNC: 1.98 MG/DL — HIGH (ref 0.5–1.3)
CREAT SERPL-MCNC: 2.05 MG/DL — HIGH (ref 0.5–1.3)
DIFF PNL FLD: ABNORMAL
EOSINOPHIL # BLD AUTO: 0 K/UL — SIGNIFICANT CHANGE UP (ref 0–0.5)
EOSINOPHIL NFR BLD AUTO: 0.5 % — SIGNIFICANT CHANGE UP (ref 0–6)
GAS PNL BLDV: SIGNIFICANT CHANGE UP
GLUCOSE BLDC GLUCOMTR-MCNC: 142 MG/DL — HIGH (ref 70–99)
GLUCOSE BLDC GLUCOMTR-MCNC: 163 MG/DL — HIGH (ref 70–99)
GLUCOSE BLDC GLUCOMTR-MCNC: 169 MG/DL — HIGH (ref 70–99)
GLUCOSE BLDC GLUCOMTR-MCNC: 189 MG/DL — HIGH (ref 70–99)
GLUCOSE SERPL-MCNC: 150 MG/DL — HIGH (ref 70–99)
GLUCOSE SERPL-MCNC: 156 MG/DL — HIGH (ref 70–99)
GLUCOSE UR QL: NEGATIVE — SIGNIFICANT CHANGE UP
HCT VFR BLD CALC: 35.1 % — SIGNIFICANT CHANGE UP (ref 34.5–45)
HCT VFR BLD CALC: 36.1 % — SIGNIFICANT CHANGE UP (ref 34.5–45)
HGB BLD-MCNC: 11.1 G/DL — LOW (ref 11.5–15.5)
HGB BLD-MCNC: 11.3 G/DL — LOW (ref 11.5–15.5)
INR BLD: 3.42 RATIO — HIGH (ref 0.88–1.16)
KETONES UR-MCNC: NEGATIVE — SIGNIFICANT CHANGE UP
LACTATE SERPL-SCNC: 2.1 MMOL/L — HIGH (ref 0.7–2)
LACTATE SERPL-SCNC: 4.8 MMOL/L — CRITICAL HIGH (ref 0.7–2)
LEUKOCYTE ESTERASE UR-ACNC: ABNORMAL
LYMPHOCYTES # BLD AUTO: 0.8 K/UL — LOW (ref 1–3.3)
LYMPHOCYTES # BLD AUTO: 10.7 % — LOW (ref 13–44)
MAGNESIUM SERPL-MCNC: 1.8 MG/DL — SIGNIFICANT CHANGE UP (ref 1.6–2.6)
MAGNESIUM SERPL-MCNC: 2 MG/DL — SIGNIFICANT CHANGE UP (ref 1.6–2.6)
MCHC RBC-ENTMCNC: 22.7 PG — LOW (ref 27–34)
MCHC RBC-ENTMCNC: 22.9 PG — LOW (ref 27–34)
MCHC RBC-ENTMCNC: 31.4 GM/DL — LOW (ref 32–36)
MCHC RBC-ENTMCNC: 31.6 GM/DL — LOW (ref 32–36)
MCV RBC AUTO: 72.2 FL — LOW (ref 80–100)
MCV RBC AUTO: 72.3 FL — LOW (ref 80–100)
MONOCYTES # BLD AUTO: 1 K/UL — HIGH (ref 0–0.9)
MONOCYTES NFR BLD AUTO: 12.4 % — SIGNIFICANT CHANGE UP (ref 2–14)
NEUTROPHILS # BLD AUTO: 5.8 K/UL — SIGNIFICANT CHANGE UP (ref 1.8–7.4)
NEUTROPHILS NFR BLD AUTO: 75.3 % — SIGNIFICANT CHANGE UP (ref 43–77)
NITRITE UR-MCNC: NEGATIVE — SIGNIFICANT CHANGE UP
PH UR: 6 — SIGNIFICANT CHANGE UP (ref 5–8)
PHOSPHATE SERPL-MCNC: 6.5 MG/DL — HIGH (ref 2.5–4.5)
PHOSPHATE SERPL-MCNC: 6.5 MG/DL — HIGH (ref 2.5–4.5)
PLATELET # BLD AUTO: 150 K/UL — SIGNIFICANT CHANGE UP (ref 150–400)
PLATELET # BLD AUTO: 175 K/UL — SIGNIFICANT CHANGE UP (ref 150–400)
POTASSIUM SERPL-MCNC: 4.5 MMOL/L — SIGNIFICANT CHANGE UP (ref 3.5–5.3)
POTASSIUM SERPL-MCNC: 4.6 MMOL/L — SIGNIFICANT CHANGE UP (ref 3.5–5.3)
POTASSIUM SERPL-SCNC: 4.5 MMOL/L — SIGNIFICANT CHANGE UP (ref 3.5–5.3)
POTASSIUM SERPL-SCNC: 4.6 MMOL/L — SIGNIFICANT CHANGE UP (ref 3.5–5.3)
PROT SERPL-MCNC: 6.6 G/DL — SIGNIFICANT CHANGE UP (ref 6–8.3)
PROT SERPL-MCNC: 7.1 G/DL — SIGNIFICANT CHANGE UP (ref 6–8.3)
PROT UR-MCNC: ABNORMAL
PROTHROM AB SERPL-ACNC: 40.9 SEC — HIGH (ref 10–12.9)
RBC # BLD: 4.86 M/UL — SIGNIFICANT CHANGE UP (ref 3.8–5.2)
RBC # BLD: 5 M/UL — SIGNIFICANT CHANGE UP (ref 3.8–5.2)
RBC # FLD: 23.3 % — HIGH (ref 10.3–14.5)
RBC # FLD: 23.8 % — HIGH (ref 10.3–14.5)
SODIUM SERPL-SCNC: 122 MMOL/L — LOW (ref 135–145)
SODIUM SERPL-SCNC: 123 MMOL/L — LOW (ref 135–145)
SP GR SPEC: 1.01 — SIGNIFICANT CHANGE UP (ref 1.01–1.02)
UROBILINOGEN FLD QL: ABNORMAL
WBC # BLD: 7.2 K/UL — SIGNIFICANT CHANGE UP (ref 3.8–10.5)
WBC # BLD: 7.7 K/UL — SIGNIFICANT CHANGE UP (ref 3.8–10.5)
WBC # FLD AUTO: 7.2 K/UL — SIGNIFICANT CHANGE UP (ref 3.8–10.5)
WBC # FLD AUTO: 7.7 K/UL — SIGNIFICANT CHANGE UP (ref 3.8–10.5)

## 2018-11-07 PROCEDURE — 93306 TTE W/DOPPLER COMPLETE: CPT | Mod: 26

## 2018-11-07 PROCEDURE — 71045 X-RAY EXAM CHEST 1 VIEW: CPT | Mod: 26

## 2018-11-07 PROCEDURE — 93010 ELECTROCARDIOGRAM REPORT: CPT

## 2018-11-07 PROCEDURE — 99291 CRITICAL CARE FIRST HOUR: CPT

## 2018-11-07 RX ORDER — CALCIUM CHLORIDE
200 POWDER (GRAM) MISCELLANEOUS ONCE
Qty: 0 | Refills: 0 | Status: COMPLETED | OUTPATIENT
Start: 2018-11-07 | End: 2018-11-07

## 2018-11-07 RX ORDER — CEFTRIAXONE 500 MG/1
1 INJECTION, POWDER, FOR SOLUTION INTRAMUSCULAR; INTRAVENOUS EVERY 24 HOURS
Qty: 0 | Refills: 0 | Status: DISCONTINUED | OUTPATIENT
Start: 2018-11-07 | End: 2018-11-09

## 2018-11-07 RX ORDER — BUMETANIDE 0.25 MG/ML
2 INJECTION INTRAMUSCULAR; INTRAVENOUS ONCE
Qty: 0 | Refills: 0 | Status: COMPLETED | OUTPATIENT
Start: 2018-11-07 | End: 2018-11-07

## 2018-11-07 RX ORDER — CALCIUM GLUCONATE 100 MG/ML
1 VIAL (ML) INTRAVENOUS ONCE
Qty: 0 | Refills: 0 | Status: COMPLETED | OUTPATIENT
Start: 2018-11-07 | End: 2018-11-07

## 2018-11-07 RX ORDER — CALCIUM ACETATE 667 MG
2001 TABLET ORAL
Qty: 0 | Refills: 0 | Status: DISCONTINUED | OUTPATIENT
Start: 2018-11-07 | End: 2018-11-23

## 2018-11-07 RX ORDER — DOBUTAMINE HCL 250MG/20ML
2.5 VIAL (ML) INTRAVENOUS
Qty: 500 | Refills: 0 | Status: DISCONTINUED | OUTPATIENT
Start: 2018-11-07 | End: 2018-11-08

## 2018-11-07 RX ORDER — ACETAMINOPHEN 500 MG
650 TABLET ORAL EVERY 6 HOURS
Qty: 0 | Refills: 0 | Status: DISCONTINUED | OUTPATIENT
Start: 2018-11-07 | End: 2018-11-09

## 2018-11-07 RX ORDER — NYSTATIN/TRIAMCINOLONE ACET
1 OINTMENT (GRAM) TOPICAL EVERY 12 HOURS
Qty: 0 | Refills: 0 | Status: DISCONTINUED | OUTPATIENT
Start: 2018-11-07 | End: 2018-11-09

## 2018-11-07 RX ORDER — FUROSEMIDE 40 MG
80 TABLET ORAL ONCE
Qty: 0 | Refills: 0 | Status: COMPLETED | OUTPATIENT
Start: 2018-11-07 | End: 2018-11-07

## 2018-11-07 RX ORDER — HEPARIN SODIUM 5000 [USP'U]/ML
5000 INJECTION INTRAVENOUS; SUBCUTANEOUS EVERY 8 HOURS
Qty: 0 | Refills: 0 | Status: DISCONTINUED | OUTPATIENT
Start: 2018-11-07 | End: 2018-11-07

## 2018-11-07 RX ORDER — CHLORHEXIDINE GLUCONATE 213 G/1000ML
1 SOLUTION TOPICAL
Qty: 0 | Refills: 0 | Status: DISCONTINUED | OUTPATIENT
Start: 2018-11-07 | End: 2018-11-09

## 2018-11-07 RX ADMIN — Medication 80 MILLIGRAM(S): at 05:51

## 2018-11-07 RX ADMIN — Medication 1: at 13:10

## 2018-11-07 RX ADMIN — Medication 667 MILLIGRAM(S): at 08:52

## 2018-11-07 RX ADMIN — Medication 175 MICROGRAM(S): at 05:24

## 2018-11-07 RX ADMIN — Medication 7.26 MICROGRAM(S)/KG/MIN: at 22:31

## 2018-11-07 RX ADMIN — CEFTRIAXONE 100 GRAM(S): 500 INJECTION, POWDER, FOR SOLUTION INTRAMUSCULAR; INTRAVENOUS at 17:26

## 2018-11-07 RX ADMIN — Medication 1: at 17:26

## 2018-11-07 RX ADMIN — Medication 80 MILLIGRAM(S): at 11:51

## 2018-11-07 RX ADMIN — Medication 1 TABLET(S): at 13:10

## 2018-11-07 RX ADMIN — BUMETANIDE 2 MILLIGRAM(S): 0.25 INJECTION INTRAMUSCULAR; INTRAVENOUS at 23:20

## 2018-11-07 RX ADMIN — Medication 2001 MILLIGRAM(S): at 14:49

## 2018-11-07 RX ADMIN — Medication 81 MILLIGRAM(S): at 13:10

## 2018-11-07 RX ADMIN — Medication 7.26 MICROGRAM(S)/KG/MIN: at 10:30

## 2018-11-07 RX ADMIN — CALCITRIOL 0.5 MICROGRAM(S): 0.5 CAPSULE ORAL at 17:26

## 2018-11-07 RX ADMIN — Medication 1 APPLICATION(S): at 21:18

## 2018-11-07 RX ADMIN — Medication 200 GRAM(S): at 05:24

## 2018-11-07 RX ADMIN — Medication 1 TABLET(S): at 22:31

## 2018-11-07 RX ADMIN — Medication 650 MILLIGRAM(S): at 06:06

## 2018-11-07 RX ADMIN — Medication 1 TABLET(S): at 05:24

## 2018-11-07 RX ADMIN — Medication 5 MILLIGRAM(S): at 00:27

## 2018-11-07 RX ADMIN — Medication 650 MILLIGRAM(S): at 07:02

## 2018-11-07 RX ADMIN — Medication 100 GRAM(S): at 00:28

## 2018-11-07 RX ADMIN — Medication 200 MILLIGRAM(S): at 22:30

## 2018-11-07 RX ADMIN — CALCITRIOL 0.5 MICROGRAM(S): 0.5 CAPSULE ORAL at 05:24

## 2018-11-07 NOTE — PROVIDER CONTACT NOTE (OTHER) - SITUATION
pt voided 1 time today, however incontinent and unable to measure. Pt since has not been able to void after several attempts on bed pan. Pt in pain.  4 hours since voiding. Bladder scan 516 mls

## 2018-11-07 NOTE — PROGRESS NOTE ADULT - SUBJECTIVE AND OBJECTIVE BOX
No pain, no shortness of breath      VITAL:  T(C): , Max: 36.8 (11-06-18 @ 12:01)  T(F): , Max: 98.2 (11-06-18 @ 12:01)  HR: 90 (11-07-18 @ 07:00)  BP: 78/63 (11-07-18 @ 07:00)  BP(mean): 68 (11-07-18 @ 07:00)  RR: 11 (11-07-18 @ 07:00)  SpO2: 98% (11-07-18 @ 07:00)      PHYSICAL EXAM:  Constitutional: NAD, Alert  HEENT: NCAT, MMM  Neck: Supple, (+) large JVD  Respiratory: decreased BS b/l bases  Cardiovascular: RRR s1s2, no m/r/g  Gastrointestinal: BS+, soft, NT, (+)large distension  Extremities: 2-3+ b/l LE edema  Neurological: no focal deficits; strength grossly intact  Back: no CVAT b/l  Skin: No rashes, no nevi      LABS:                        11.3   7.2   )-----------( 175      ( 07 Nov 2018 03:59 )             36.1     Na(123)/K(4.5)/Cl(80)/HCO3(21)/BUN(76)/Cr(1.98)Glu(150)/Ca(5.9)/Mg(2.0)/PO4(6.5)    11-07 @ 03:59  Na(123)/K(4.5)/Cl(77)/HCO3(22)/BUN(72)/Cr(1.98)Glu(151)/Ca(5.9)/Mg(1.8)/PO4(6.6)    11-06 @ 21:13  Na(121)/K(6.1)/Cl(75)/HCO3(23)/BUN(74)/Cr(2.02)Glu(142)/Ca(6.2)/Mg(--)/PO4(--)    11-06 @ 15:24  Na(116)/K(6.4)/Cl(73)/HCO3(17)/BUN(75)/Cr(1.95)Glu(168)/Ca(6.1)/Mg(--)/PO4(--)    11-06 @ 13:24          ASSESSMENT: 63F w/ HTN, DM2, thyroid CA s/p resection, acquired hypoparathyroidism, and severe HFrEF, 11/6/18 a/w vaginal bleeding/MAGNUS/electrolyte derangements/ acute on chronic HFrEF    (1)Renal - MAGNUS - Prerenally mediated in setting of decompensated CHF    (2)Hyperkalemia - much improved, off KCl tabs and off Entresto    (3)Bone - hypocalcemia/hyperphosphatemia due to hypoparathyroidism (s/p iatrogenic parathyroidectomy). Very chronic.    (4)Hyponatremia - due to decompensated CHF. Slowly improving (ie improving at an appropriate rate). I anticipate that the hyponatremia will improve further if inotropic therapy is added. Risk of induction of central pontine myelinosis from rapid correction of hyponatremia is negligible at this point      RECOMMEND:  (1)Can eliminate the dietary K+ restriction  (2)Hold Entresto for now  (3)No objection to adding inotropic therapy at this point; if inotropic therapy is not to be added, then would give a dose of Tolvaptan (15mg) PO x 1 in effort to raise the serum sodium (would not give the Tolvaptan if inotropic therapy is to be added)  (4)Increas Phoslo from 667mg po tid with meals to 2001mg po tid with meals  (5)Calcium 1gm IV x 1  (6)BMP +Mg + PO4 at least q12h  (7)Dose new meds for GFR 20-30ml/min      Brian Henry MD  [facility]  [phone] No pain, (+)cough/pinkish sputum. (+)mild sob.      VITAL:  T(C): , Max: 36.8 (11-06-18 @ 12:01)  T(F): , Max: 98.2 (11-06-18 @ 12:01)  HR: 90 (11-07-18 @ 07:00)  BP: 78/63 (11-07-18 @ 07:00)  BP(mean): 68 (11-07-18 @ 07:00)  RR: 11 (11-07-18 @ 07:00)  SpO2: 98% (11-07-18 @ 07:00)      PHYSICAL EXAM:  Constitutional: NAD, Alert  HEENT: NCAT, MMM  Neck: Supple, (+) large JVD  Respiratory: decreased BS b/l bases  Cardiovascular: RRR s1s2, no m/r/g  Gastrointestinal: BS+, soft, NT, (+)large distension  Extremities: 2-3+ b/l LE edema  Neurological: no focal deficits; strength grossly intact  Back: no CVAT b/l  Skin: No rashes, no nevi      LABS:                        11.3   7.2   )-----------( 175      ( 07 Nov 2018 03:59 )             36.1     Na(123)/K(4.5)/Cl(80)/HCO3(21)/BUN(76)/Cr(1.98)Glu(150)/Ca(5.9)/Mg(2.0)/PO4(6.5)    11-07 @ 03:59  Na(123)/K(4.5)/Cl(77)/HCO3(22)/BUN(72)/Cr(1.98)Glu(151)/Ca(5.9)/Mg(1.8)/PO4(6.6)    11-06 @ 21:13  Na(121)/K(6.1)/Cl(75)/HCO3(23)/BUN(74)/Cr(2.02)Glu(142)/Ca(6.2)/Mg(--)/PO4(--)    11-06 @ 15:24  Na(116)/K(6.4)/Cl(73)/HCO3(17)/BUN(75)/Cr(1.95)Glu(168)/Ca(6.1)/Mg(--)/PO4(--)    11-06 @ 13:24          ASSESSMENT: 63F w/ HTN, DM2, thyroid CA s/p resection, acquired hypoparathyroidism, and severe HFrEF, 11/6/18 a/w vaginal bleeding/MAGNUS/electrolyte derangements/ acute on chronic HFrEF    (1)Renal - MAGNUS - Prerenally mediated in setting of decompensated CHF    (2)Hyperkalemia - much improved, off KCl tabs and off Entresto    (3)Bone - hypocalcemia/hyperphosphatemia due to hypoparathyroidism (s/p iatrogenic parathyroidectomy). Very chronic.    (4)Hyponatremia - due to decompensated CHF. Slowly improving (ie improving at an appropriate rate). I anticipate that the hyponatremia will improve further if inotropic therapy is added. Risk of induction of central pontine myelinosis from rapid correction of hyponatremia is negligible at this point      RECOMMEND:  (1)Can eliminate the dietary K+ restriction  (2)Hold Entresto for now  (3)No objection to adding inotropic therapy at this point; if inotropic therapy is not to be added, then would give a dose of Tolvaptan (15mg) PO x 1 in effort to raise the serum sodium (would not give the Tolvaptan if inotropic therapy is to be added)  (4)Increas Phoslo from 667mg po tid with meals to 2001mg po tid with meals  (5)Calcium 1gm IV x 1  (6)BMP +Mg + PO4 at least q12h  (7)Dose new meds for GFR 20-30ml/min      Brian Henry MD  [facility]  [phone] (+)b/l ankle pain; (+)cough/pinkish sputum; (+)mild sob.      VITAL:  T(C): , Max: 36.8 (11-06-18 @ 12:01)  T(F): , Max: 98.2 (11-06-18 @ 12:01)  HR: 90 (11-07-18 @ 07:00)  BP: 78/63 (11-07-18 @ 07:00)  BP(mean): 68 (11-07-18 @ 07:00)  RR: 11 (11-07-18 @ 07:00)  SpO2: 98% (11-07-18 @ 07:00)      PHYSICAL EXAM:  Constitutional: NAD, Alert  HEENT: NCAT, MMM  Neck: Supple, (+) large JVD  Respiratory: decreased BS b/l bases  Cardiovascular: RRR s1s2, no m/r/g  Gastrointestinal: BS+, soft, NT, (+)large distension  Extremities: 2-3+ b/l LE edema  Neurological: no focal deficits; strength grossly intact  Back: no CVAT b/l  Skin: No rashes, no nevi      LABS:                        11.3   7.2   )-----------( 175      ( 07 Nov 2018 03:59 )             36.1     Na(123)/K(4.5)/Cl(80)/HCO3(21)/BUN(76)/Cr(1.98)Glu(150)/Ca(5.9)/Mg(2.0)/PO4(6.5)    11-07 @ 03:59  Na(123)/K(4.5)/Cl(77)/HCO3(22)/BUN(72)/Cr(1.98)Glu(151)/Ca(5.9)/Mg(1.8)/PO4(6.6)    11-06 @ 21:13  Na(121)/K(6.1)/Cl(75)/HCO3(23)/BUN(74)/Cr(2.02)Glu(142)/Ca(6.2)/Mg(--)/PO4(--)    11-06 @ 15:24  Na(116)/K(6.4)/Cl(73)/HCO3(17)/BUN(75)/Cr(1.95)Glu(168)/Ca(6.1)/Mg(--)/PO4(--)    11-06 @ 13:24          ASSESSMENT: 63F w/ HTN, DM2, thyroid CA s/p resection, acquired hypoparathyroidism, and severe HFrEF, 11/6/18 a/w vaginal bleeding/MAGNUS/electrolyte derangements/ acute on chronic HFrEF    (1)Renal - MAGNUS - Prerenally mediated in setting of decompensated CHF    (2)Hyperkalemia - much improved, off KCl tabs and off Entresto    (3)Bone - hypocalcemia/hyperphosphatemia due to hypoparathyroidism (s/p iatrogenic parathyroidectomy). Very chronic.    (4)Hyponatremia - due to decompensated CHF. Slowly improving (ie improving at an appropriate rate). I anticipate that the hyponatremia will improve further if inotropic therapy is added. Risk of induction of central pontine myelinosis from rapid correction of hyponatremia is negligible at this point      RECOMMEND:  (1)Can eliminate the dietary K+ restriction  (2)Hold Entresto for now  (3)No objection to adding inotropic therapy at this point; if inotropic therapy is not to be added, then would give a dose of Tolvaptan (15mg) PO x 1 in effort to raise the serum sodium (would not give the Tolvaptan if inotropic therapy is to be added)  (4)Increas Phoslo from 667mg po tid with meals to 2001mg po tid with meals  (5)Calcium 1gm IV x 1  (6)BMP +Mg + PO4 at least q12h  (7)Dose new meds for GFR 20-30ml/min      Brian Henry MD  [facility]  [phone]

## 2018-11-07 NOTE — CONSULT NOTE ADULT - SUBJECTIVE AND OBJECTIVE BOX
CHIEF COMPLAINT: Vaginal Bleeding    HISTORY OF PRESENT ILLNESS: The Pt is a 63 y/o woman with h/o NICM (LVEF 20%, LVIDd 6.5 cm), initially identified in 2013, s/p CRT-D (6/2017), Mod-Severe MR, Severe TR, HTN, HLD, DM II, and Thyroid Cancer s/p Resection, Surgical Hypoparathyroidism with Chronic Hypcalcemia who presented to the ED with c/o vaginal bleeding which began on the day of presentation.     She has had multiple hospitalizations in the past year with ADHF, intermittently requiring inotropic support, and has refused consideration of advanced therapies in the past.         Allergies    Isordil (Headache)  penicillin (Rash)    Intolerances    	    MEDICATIONS:  aspirin  chewable 81 milliGRAM(s) Oral daily  DOBUTamine Infusion 2.5 MICROgram(s)/kG/Min IV Continuous <Continuous>        acetaminophen   Tablet .. 650 milliGRAM(s) Oral every 6 hours PRN      insulin lispro (HumaLOG) corrective regimen sliding scale   SubCutaneous three times a day before meals  insulin lispro (HumaLOG) corrective regimen sliding scale   SubCutaneous at bedtime  levothyroxine 175 MICROGram(s) Oral daily    calcitriol   Capsule 0.5 MICROGram(s) Oral two times a day  calcium acetate 667 milliGRAM(s) Oral three times a day with meals  calcium carbonate 1250 mG  + Vitamin D (OsCal 500 + D) 1 Tablet(s) Oral three times a day  influenza   Vaccine 0.5 milliLiter(s) IntraMuscular once      PAST MEDICAL & SURGICAL HISTORY:  Asthma  CHF (congestive heart failure): with EF of 10% s/p AICD  DM (diabetes mellitus)  HTN (hypertension)  Thyroid ca  AICD (automatic cardioverter/defibrillator) present  S/P thyroidectomy      FAMILY HISTORY:  No pertinent family history in first degree relatives      SOCIAL HISTORY: Non-smoker, denies EtOH or illicit drug abuse, , lives with  and children    REVIEW OF SYSTEMS:  General: no fatigue/malaise, weight loss/gain.  Skin: no rashes.  Ophthalmologic: no blurred vision, no loss of vision. 	  ENT: no sore throat, rhinorrhea, sinus congestion.  Respiratory: no SOB, cough or wheeze.  Gastrointestinal:  no N/V/D, no melena/hematemesis/hematochezia.  Genitourinary: no dysuria/hesitancy or hematuria.  Musculoskeletal: no myalgias or arthralgias.  Neurological: no changes in vision or hearing, no lightheadedness/dizziness, no syncope/near syncope	  Psychiatric: no unusual stress/anxiety.   Hematology/Lymphatics: +vaginal bleeding   Endocrine: no unusual thirst.   All others negative except as stated above and in HPI.    PHYSICAL EXAM:  T(C): 36.4 (11-07-18 @ 08:00), Max: 36.7 (11-06-18 @ 21:24)  HR: 88 (11-07-18 @ 12:00) (76 - 94)  BP: 86/62 (11-07-18 @ 12:00) (78/55 - 106/67)  RR: 10 (11-07-18 @ 12:00) (10 - 30)  SpO2: 99% (11-07-18 @ 12:00) (94% - 100%)  Wt(kg): --  I&O's Summary    06 Nov 2018 07:01  -  07 Nov 2018 07:00  --------------------------------------------------------  IN: 200 mL / OUT: 550 mL / NET: -350 mL    07 Nov 2018 07:01  -  07 Nov 2018 12:06  --------------------------------------------------------  IN: 171.9 mL / OUT: 15 mL / NET: 156.9 mL        Appearance: Normal	  HEENT:   Normal oral mucosa  Cardiovascular: normal rate, regular rhythm, audible S1 and S2, no murmurs, rubs, or gallops, no JVD, no edema  Respiratory: Lungs clear to auscultation	  Psychiatry: Mood & affect appropriate  Gastrointestinal:  Soft  Skin: No rashes, No ecchymoses, No cyanosis	  Neurologic: Non-focal  Extremities: No clubbing, cyanosis or edema  Vascular: Peripheral pulses palpable         LABS:	 	    CBC Full  -  ( 07 Nov 2018 03:59 )  WBC Count : 7.2 K/uL  Hemoglobin : 11.3 g/dL  Hematocrit : 36.1 %  Platelet Count - Automated : 175 K/uL  Mean Cell Volume : 72.2 fl  Mean Cell Hemoglobin : 22.7 pg  Mean Cell Hemoglobin Concentration : 31.4 gm/dL  Auto Neutrophil # : x  Auto Lymphocyte # : x  Auto Monocyte # : x  Auto Eosinophil # : x  Auto Basophil # : x  Auto Neutrophil % : x  Auto Lymphocyte % : x  Auto Monocyte % : x  Auto Eosinophil % : x  Auto Basophil % : x    11-07    123<L>  |  80<L>  |  76<H>  ----------------------------<  150<H>  4.5   |  21<L>  |  1.98<H>  11-06    123<L>  |  77<L>  |  72<H>  ----------------------------<  151<H>  4.5   |  22  |  1.98<H>    Ca    5.9<LL>      07 Nov 2018 03:59  Ca    5.9<LL>      06 Nov 2018 21:13  Phos  6.5     11-07  Phos  6.6     11-06  Mg     2.0     11-07  Mg     1.8     11-06    TPro  7.1  /  Alb  3.1<L>  /  TBili  10.8<H>  /  DBili  x   /  AST  62<H>  /  ALT  32  /  AlkPhos  364<H>  11-07  TPro  8.1  /  Alb  3.5  /  TBili  11.7<H>  /  DBili  x   /  AST  120<H>  /  ALT  36  /  AlkPhos  398<H>  11-06    CARDIAC MARKERS:    Troponin T, High Sensitivity: 26    TELEMETRY: 	    ECG:  	  RADIOLOGY:  OTHER: 	    PREVIOUS DIAGNOSTIC TESTING:      Echocardiogram:    < from: Transthoracic Echocardiogram (08.23.18 @ 11:25) >  CONCLUSIONS:  1. Tethered mitral valve leaflets with normal opening.  Moderate-severe mitral regurgitation.  2. Normal left ventricular internal dimensions and wall  thicknesses.  3. Severe global left ventricular systolic dysfunction.  4. Moderate right atrial enlargement.  A device wire is  noted in the right heart.  5. Right ventricular enlargement with decreased right  ventricular systolic function.  6. Normal tricuspid valve.  Severe tricuspid regurgitation.  7. Estimated pulmonary artery systolic pressure equals 24  mm Hg, assuming right atrial pressure equals 10  mm Hg,  consistent with normal pulmonary pressures.  *** Compared with echocardiogram of 6/29/2018, no  significant changes noted.    Catheterization:    < from: Cardiac Cath Lab - Adult (05.25.18 @ 11:46) >    Pressures:  - HR: 70  Pressures:  -- Pulmonary Artery (S/D/M): 57/39/46  Pressures:  -- Pulmonary Capillary Wedge: 39/45/37  Pressures:  -- Right Atrium (a/v/M): 30/33/28  Pressures:  -- Right Ventricle (s/edp): 50/23/--  O2 Sats:  -- AO: 11.7/100/15.91  O2 Sats:  -- PA: 11.7/35.2/5.6  O2 Sats:  -- PA: 11.7/34.7/5.52  Outputs:  -- OUTPUTS: CO by Salvador: 2.72  Outputs:  -- OUTPUTS: [place] cardiac index: 1.21  Outputs:  -- RESISTANCES: Pulmonary vascular resistance (Wood Units): 3.31 CHIEF COMPLAINT: Vaginal Bleeding    HISTORY OF PRESENT ILLNESS: The Pt is a 61 y/o woman with h/o NICM (LVEF 20%, LVIDd 6.5 cm), initially identified in 2013, s/p CRT-D (6/2017), Mod-Severe MR, Severe TR, HTN, HLD, DM II, and Thyroid Cancer s/p Resection, Surgical Hypoparathyroidism with Chronic Hypcalcemia who presented to the ED with c/o vaginal bleeding which began on the day of presentation. She also reports generalized malaise since a recent discharge from [facility], where she was admitted about one week ago after experiencing an "episode" of what appears to have been ADHF. The patient left AMA because she did not get along with her doctor there. Her adherence to medical therapy and diet are unclear. She has had multiple hospitalizations in the past year with ADHF, intermittently requiring inotropic support, and has refused consideration of advanced therapies in the past. In addition to vaginal bleeding and malaise, she also reports chronic bilateral ankle pain since 3/2018.        Allergies    Isordil (Headache)  penicillin (Rash)    Intolerances    	    MEDICATIONS:  aspirin  chewable 81 milliGRAM(s) Oral daily  DOBUTamine Infusion 2.5 MICROgram(s)/kG/Min IV Continuous <Continuous>        acetaminophen   Tablet .. 650 milliGRAM(s) Oral every 6 hours PRN      insulin lispro (HumaLOG) corrective regimen sliding scale   SubCutaneous three times a day before meals  insulin lispro (HumaLOG) corrective regimen sliding scale   SubCutaneous at bedtime  levothyroxine 175 MICROGram(s) Oral daily    calcitriol   Capsule 0.5 MICROGram(s) Oral two times a day  calcium acetate 667 milliGRAM(s) Oral three times a day with meals  calcium carbonate 1250 mG  + Vitamin D (OsCal 500 + D) 1 Tablet(s) Oral three times a day  influenza   Vaccine 0.5 milliLiter(s) IntraMuscular once      PAST MEDICAL & SURGICAL HISTORY:  Asthma  CHF (congestive heart failure): with EF of 10% s/p AICD  DM (diabetes mellitus)  HTN (hypertension)  Thyroid ca  AICD (automatic cardioverter/defibrillator) present  S/P thyroidectomy      FAMILY HISTORY:  No pertinent family history in first degree relatives      SOCIAL HISTORY: Non-smoker, denies EtOH or illicit drug abuse, , lives with  and children    REVIEW OF SYSTEMS:  General: +fatigue/malaise  Skin: no rashes.  Ophthalmologic: no blurred vision, no loss of vision. 	  ENT: no sore throat, rhinorrhea, sinus congestion.  Respiratory: +SOB  Gastrointestinal:  no N/V/D, no melena/hematemesis/hematochezia.  Genitourinary: no dysuria/hesitancy or hematuria.  Musculoskeletal: +arthralgias.  Neurological: no changes in vision or hearing, no lightheadedness/dizziness, no syncope/near syncope	  Psychiatric: no unusual stress/anxiety.   Hematology/Lymphatics: +vaginal bleeding   Endocrine: no unusual thirst.   All others negative except as stated above and in HPI.    PHYSICAL EXAM:  T(C): 36.4 (11-07-18 @ 08:00), Max: 36.7 (11-06-18 @ 21:24)  HR: 88 (11-07-18 @ 12:00) (76 - 94)  BP: 86/62 (11-07-18 @ 12:00) (78/55 - 106/67)  RR: 10 (11-07-18 @ 12:00) (10 - 30)  SpO2: 99% (11-07-18 @ 12:00) (94% - 100%)  Wt(kg): --  I&O's Summary    06 Nov 2018 07:01  -  07 Nov 2018 07:00  --------------------------------------------------------  IN: 200 mL / OUT: 550 mL / NET: -350 mL    07 Nov 2018 07:01  -  07 Nov 2018 12:06  --------------------------------------------------------  IN: 171.9 mL / OUT: 15 mL / NET: 156.9 mL        Appearance: Normal	  HEENT:   Normal oral mucosa  Cardiovascular: normal rate, regular rhythm, audible S1 and S2, no murmurs, rubs, or gallops, no JVD, no edema  Respiratory: Lungs clear to auscultation	  Psychiatry: Mood & affect appropriate  Gastrointestinal:  Soft  Skin: No rashes, No ecchymoses, No cyanosis	  Neurologic: Non-focal  Extremities: No clubbing, cyanosis or edema  Vascular: Peripheral pulses palpable         LABS:	 	    CBC Full  -  ( 07 Nov 2018 03:59 )  WBC Count : 7.2 K/uL  Hemoglobin : 11.3 g/dL  Hematocrit : 36.1 %  Platelet Count - Automated : 175 K/uL  Mean Cell Volume : 72.2 fl  Mean Cell Hemoglobin : 22.7 pg  Mean Cell Hemoglobin Concentration : 31.4 gm/dL  Auto Neutrophil # : x  Auto Lymphocyte # : x  Auto Monocyte # : x  Auto Eosinophil # : x  Auto Basophil # : x  Auto Neutrophil % : x  Auto Lymphocyte % : x  Auto Monocyte % : x  Auto Eosinophil % : x  Auto Basophil % : x    11-07    123<L>  |  80<L>  |  76<H>  ----------------------------<  150<H>  4.5   |  21<L>  |  1.98<H>  11-06    123<L>  |  77<L>  |  72<H>  ----------------------------<  151<H>  4.5   |  22  |  1.98<H>    Ca    5.9<LL>      07 Nov 2018 03:59  Ca    5.9<LL>      06 Nov 2018 21:13  Phos  6.5     11-07  Phos  6.6     11-06  Mg     2.0     11-07  Mg     1.8     11-06    TPro  7.1  /  Alb  3.1<L>  /  TBili  10.8<H>  /  DBili  x   /  AST  62<H>  /  ALT  32  /  AlkPhos  364<H>  11-07  TPro  8.1  /  Alb  3.5  /  TBili  11.7<H>  /  DBili  x   /  AST  120<H>  /  ALT  36  /  AlkPhos  398<H>  11-06    CARDIAC MARKERS:    Troponin T, High Sensitivity: 26    TELEMETRY: 	    ECG:  	  RADIOLOGY:  OTHER: 	    PREVIOUS DIAGNOSTIC TESTING:      Echocardiogram:    < from: Transthoracic Echocardiogram (08.23.18 @ 11:25) >  CONCLUSIONS:  1. Tethered mitral valve leaflets with normal opening.  Moderate-severe mitral regurgitation.  2. Normal left ventricular internal dimensions and wall  thicknesses.  3. Severe global left ventricular systolic dysfunction.  4. Moderate right atrial enlargement.  A device wire is  noted in the right heart.  5. Right ventricular enlargement with decreased right  ventricular systolic function.  6. Normal tricuspid valve.  Severe tricuspid regurgitation.  7. Estimated pulmonary artery systolic pressure equals 24  mm Hg, assuming right atrial pressure equals 10  mm Hg,  consistent with normal pulmonary pressures.  *** Compared with echocardiogram of 6/29/2018, no  significant changes noted.    Catheterization:    < from: Cardiac Cath Lab - Adult (05.25.18 @ 11:46) >    Pressures:  - HR: 70  Pressures:  -- Pulmonary Artery (S/D/M): 57/39/46  Pressures:  -- Pulmonary Capillary Wedge: 39/45/37  Pressures:  -- Right Atrium (a/v/M): 30/33/28  Pressures:  -- Right Ventricle (s/edp): 50/23/--  O2 Sats:  -- AO: 11.7/100/15.91  O2 Sats:  -- PA: 11.7/35.2/5.6  O2 Sats:  -- PA: 11.7/34.7/5.52  Outputs:  -- OUTPUTS: CO by [place]: 2.72  Outputs:  -- OUTPUTS: [place] cardiac index: 1.21  Outputs:  -- RESISTANCES: Pulmonary vascular resistance (Wood Units): 3.31

## 2018-11-07 NOTE — PROGRESS NOTE ADULT - SUBJECTIVE AND OBJECTIVE BOX
Subjective: No chest pain; pt. complains of abdominal pain   	  MEDICATIONS:  MEDICATIONS  (STANDING):  aspirin  chewable 81 milliGRAM(s) Oral daily  calcitriol   Capsule 0.5 MICROGram(s) Oral two times a day  calcium acetate 667 milliGRAM(s) Oral three times a day with meals  calcium carbonate 1250 mG  + Vitamin D (OsCal 500 + D) 1 Tablet(s) Oral three times a day  DOBUTamine Infusion 2.5 MICROgram(s)/kG/Min (7.26 mL/Hr) IV Continuous <Continuous>  influenza   Vaccine 0.5 milliLiter(s) IntraMuscular once  insulin lispro (HumaLOG) corrective regimen sliding scale   SubCutaneous three times a day before meals  insulin lispro (HumaLOG) corrective regimen sliding scale   SubCutaneous at bedtime  levothyroxine 175 MICROGram(s) Oral daily      LABS:	 	    CARDIAC MARKERS:                                11.3   7.2   )-----------( 175      ( 2018 03:59 )             36.1         123<L>  |  80<L>  |  76<H>  ----------------------------<  150<H>  4.5   |  21<L>  |  1.98<H>    Ca    5.9<LL>      2018 03:59  Phos  6.5       Mg     2.0         TPro  7.1  /  Alb  3.1<L>  /  TBili  10.8<H>  /  DBili  x   /  AST  62<H>  /  ALT  32  /  AlkPhos  364<H>      proBNP:   Lipid Profile:   HgA1c:   TSH:       PHYSICAL EXAM:  T(C): 36.4 (18 @ 08:00), Max: 36.7 (18 @ 21:24)  HR: 88 (18 @ 12:00) (76 - 94)  BP: 86/62 (18 @ 12:00) (78/55 - 106/67)  RR: 10 (18 @ 12:00) (10 - 30)  SpO2: 99% (18 @ 12:00) (94% - 100%)  Wt(kg): --  I&O's Summary    2018 07: 07:00  --------------------------------------------------------  IN: 200 mL / OUT: 550 mL / NET: -350 mL    2018 07: 12:54  --------------------------------------------------------  IN: 171.9 mL / OUT: 15 mL / NET: 156.9 mL      Height (cm): 170.18 (:)  Weight (kg): 96.8 (:)  BMI (kg/m2): 33.4 (:)  BSA (m2): 2.08 (:)    	  Lymphatic: No lymphadenopathy + pitting edema in LE bilaterally   Cardiovascular: Normal S1 S2,RRR, No murmurs , Peripheral pulses palpable 2+ bilaterally  Respiratory: Lungs clear to auscultation, normal effort 	  Gastrointestinal:  Soft, distended   Skin: No rashes, No ecchymoses, No cyanosis,       TELEMETRY:	    ECG: < from: 12 Lead ECG (18 @ 19:34) >  Atrial-sensed ventricular-paced rhythm  Biventricular pacemaker detected  ABNORMAL ECG    < end of copied text >    	  RADIOLOGY:   DIAGNOSTIC TESTING:  [ ] Echocardiogram: < from: Transthoracic Echocardiogram (18 @ 11:25) >  1. Tethered mitral valve leaflets with normal opening.  Moderate-severe mitral regurgitation.  2. Normal left ventricular internal dimensions and wall  thicknesses.  3. Severe global left ventricular systolic dysfunction.  4. Moderate right atrial enlargement.  A device wire is  noted in the right heart.  5. Right ventricular enlargement with decreased right  ventricular systolic function.  6. Normal tricuspid valve.  Severe tricuspid regurgitation.  7. Estimated pulmonary artery systolic pressure equals 24  mm Hg, assuming right atrial pressure equals 10  mm Hg,  consistent with normal pulmonary pressures.  *** Compared with echocardiogram of 2018, no  significant changes noted.    < end of copied text >    [ ]  Catheterization:  [ ] Stress Test:    OTHER: 	      ASSESSMENT/PLAN: 62 y/o F with PMHx of NICM s/p AICD, HTN, moderate-severe MR, DM, ? medication compliance who was admitted with volume overload and heart failure.  -Pt. still volume overloaded  -continue with  assissted diuresis  -monitor lactate, LFT's I/O  -no urgent ischemic evaluation needed at this time  -interrogate MDT AICD   -monitor INR  -appreciate GYN consultation  -follow up heart failure  -continue with supportive care per CCU    Kunal Muller MD

## 2018-11-07 NOTE — CONSULT NOTE ADULT - SUBJECTIVE AND OBJECTIVE BOX
R2 GYNECOLOGIC CONSULT NOTE (in progress)    63y  menopausal F, with a PMH of CHF, thyroid ca, HTN, DM, presented to the ED today with vaginal bleeding. Pt states that     OB/GYN HISTORY:     Surgical History:    AICD (automatic cardioverter/defibrillator) present  S/P thyroidectomy      Past Medical History:   Asthma  CHF (congestive heart failure)  DM (diabetes mellitus)  HTN (hypertension)  Thyroid ca      Isordil (Headache)  penicillin (Rash)      aspirin  chewable 81 milliGRAM(s) Oral daily  calcitriol   Capsule 0.5 MICROGram(s) Oral two times a day  calcium acetate 667 milliGRAM(s) Oral three times a day with meals  calcium carbonate 1250 mG  + Vitamin D (OsCal 500 + D) 1 Tablet(s) Oral three times a day  furosemide   Injectable 80 milliGRAM(s) IV Push two times a day  influenza   Vaccine 0.5 milliLiter(s) IntraMuscular once  insulin lispro (HumaLOG) corrective regimen sliding scale   SubCutaneous three times a day before meals  insulin lispro (HumaLOG) corrective regimen sliding scale   SubCutaneous at bedtime  levothyroxine 175 MICROGram(s) Oral daily  magnesium sulfate  IVPB 1 Gram(s) IV Intermittent once  phytonadione   Solution 5 milliGRAM(s) Oral once      FAMILY HISTORY:  No pertinent family history in first degree relatives      Social History:     REVIEW OF SYSTEMS:    CONSTITUTIONAL: No fever, weight loss, or fatigue  EYES: No eye pain, visual disturbances, or discharge  ENMT:  No difficulty hearing, tinnitus, vertigo; No sinus or throat pain  NECK: No pain or stiffness  BREASTS: No pain, masses, or nipple discharge  RESPIRATORY: No cough, wheezing, chills or hemoptysis; No shortness of breath  CARDIOVASCULAR: No chest pain, palpitations, dizziness, or leg swelling  GASTROINTESTINAL: No abdominal or epigastric pain. No nausea, vomiting, or hematemesis; No diarrhea or constipation. No melena or hematochezia.  GENITOURINARY: No dysuria, frequency, hematuria, or incontinence  NEUROLOGICAL: No headaches, memory loss, loss of strength, numbness, or tremors  SKIN: No itching, burning, rashes, or lesions   LYMPH NODES: No enlarged glands  ENDOCRINE: No heat or cold intolerance; No hair loss  MUSCULOSKELETAL: No joint pain or swelling; No muscle, back, or extremity pain  PSYCHIATRIC: No depression, anxiety, mood swings, or difficulty sleeping  HEME/LYMPH: No easy bruising, or bleeding gums  ALLERY AND IMMUNOLOGIC: No hives or eczema      MEDICATIONS  (STANDING):  aspirin  chewable 81 milliGRAM(s) Oral daily  calcitriol   Capsule 0.5 MICROGram(s) Oral two times a day  calcium acetate 667 milliGRAM(s) Oral three times a day with meals  calcium carbonate 1250 mG  + Vitamin D (OsCal 500 + D) 1 Tablet(s) Oral three times a day  furosemide   Injectable 80 milliGRAM(s) IV Push two times a day  influenza   Vaccine 0.5 milliLiter(s) IntraMuscular once  insulin lispro (HumaLOG) corrective regimen sliding scale   SubCutaneous three times a day before meals  insulin lispro (HumaLOG) corrective regimen sliding scale   SubCutaneous at bedtime  levothyroxine 175 MICROGram(s) Oral daily  magnesium sulfate  IVPB 1 Gram(s) IV Intermittent once  phytonadione   Solution 5 milliGRAM(s) Oral once    MEDICATIONS  (PRN):      OBJECTIVE FINDINGS:    Vital Signs Last 24 Hrs  T(C): 36.3 (2018 22:20), Max: 36.8 (2018 12:01)  T(F): 97.4 (2018 22:20), Max: 98.2 (2018 12:01)  HR: 90 (2018 00:00) (76 - 94)  BP: 81/64 (2018 00:00) (81/64 - 105/82)  BP(mean): 75 (2018 00:00) (60 - 80)  RR: 18 (2018 00:00) (11 - 30)  SpO2: 97% (2018 00:00) (96% - 100%)    PHYSICAL EXAM:    GENERAL: NAD, well-developed  HEAD:  Atraumatic, Normocephalic  EYES: EOMI, PERRLA, conjunctiva and sclera clear  ENMT: No tonsillar erythema, exudates, or enlargement; Moist mucous membranes, Good dentition, No lesions  NECK: Supple, No JVD, Normal thyroid  NERVOUS SYSTEM:  Alert & Oriented X3, Good concentration; Motor Strength 5/5 B/L upper and lower extremities; DTRs 2+ intact and symmetric  CHEST/LUNG: Clear to percussion bilaterally; No rales, rhonchi, wheezing, or rubs  HEART: Regular rate and rhythm; No murmurs, rubs, or gallops  ABDOMEN: Soft, Nontender, Nondistended; Bowel sounds present, No rebound, No guarding  EXTREMITIES:  2+ Peripheral Pulses, No clubbing, cyanosis, or edema, Rob's sign negative  LYMPH: No lymphadenopathy noted  SKIN: No rashes or lesions  PELVIC:   RECTAL:      LABS:                        12.3   9.2   )-----------( 178      ( 2018 13:24 )             39.0         123<L>  |  77<L>  |  72<H>  ----------------------------<  151<H>  4.5   |  22  |  1.98<H>    Ca    5.9<LL>      2018 21:13  Phos  6.6       Mg     1.8         TPro  8.1  /  Alb  3.5  /  TBili  11.7<H>  /  DBili  x   /  AST  120<H>  /  ALT  36  /  AlkPhos  398<H>      PT/INR - ( 2018 15:24 )   PT: 38.4 sec;   INR: 3.22 ratio         PTT - ( 2018 15:24 )  PTT:31.4 sec      RADIOLOGY & ADDITIONAL STUDIES: R2 GYNECOLOGIC CONSULT NOTE (in progress)    63y  menopausal F, with a PMH of CHF, thyroid ca, HTN, DM, presented to the ED today with vaginal bleeding. Pt states that she first started to have vaginal bleeding 3 months ago after her [facility] admission for     OB/GYN HISTORY:     Surgical History:    AICD (automatic cardioverter/defibrillator) present  S/P thyroidectomy      Past Medical History:   Asthma  CHF (congestive heart failure)  DM (diabetes mellitus)  HTN (hypertension)  Thyroid ca      Isordil (Headache)  penicillin (Rash)      aspirin  chewable 81 milliGRAM(s) Oral daily  calcitriol   Capsule 0.5 MICROGram(s) Oral two times a day  calcium acetate 667 milliGRAM(s) Oral three times a day with meals  calcium carbonate 1250 mG  + Vitamin D (OsCal 500 + D) 1 Tablet(s) Oral three times a day  furosemide   Injectable 80 milliGRAM(s) IV Push two times a day  influenza   Vaccine 0.5 milliLiter(s) IntraMuscular once  insulin lispro (HumaLOG) corrective regimen sliding scale   SubCutaneous three times a day before meals  insulin lispro (HumaLOG) corrective regimen sliding scale   SubCutaneous at bedtime  levothyroxine 175 MICROGram(s) Oral daily  magnesium sulfate  IVPB 1 Gram(s) IV Intermittent once  phytonadione   Solution 5 milliGRAM(s) Oral once      FAMILY HISTORY:  No pertinent family history in first degree relatives      Social History:     REVIEW OF SYSTEMS:    CONSTITUTIONAL: No fever, weight loss, or fatigue  EYES: No eye pain, visual disturbances, or discharge  ENMT:  No difficulty hearing, tinnitus, vertigo; No sinus or throat pain  NECK: No pain or stiffness  BREASTS: No pain, masses, or nipple discharge  RESPIRATORY: No cough, wheezing, chills or hemoptysis; No shortness of breath  CARDIOVASCULAR: No chest pain, palpitations, dizziness, or leg swelling  GASTROINTESTINAL: No abdominal or epigastric pain. No nausea, vomiting, or hematemesis; No diarrhea or constipation. No melena or hematochezia.  GENITOURINARY: No dysuria, frequency, hematuria, or incontinence  NEUROLOGICAL: No headaches, memory loss, loss of strength, numbness, or tremors  SKIN: No itching, burning, rashes, or lesions   LYMPH NODES: No enlarged glands  ENDOCRINE: No heat or cold intolerance; No hair loss  MUSCULOSKELETAL: No joint pain or swelling; No muscle, back, or extremity pain  PSYCHIATRIC: No depression, anxiety, mood swings, or difficulty sleeping  HEME/LYMPH: No easy bruising, or bleeding gums  ALLERY AND IMMUNOLOGIC: No hives or eczema      MEDICATIONS  (STANDING):  aspirin  chewable 81 milliGRAM(s) Oral daily  calcitriol   Capsule 0.5 MICROGram(s) Oral two times a day  calcium acetate 667 milliGRAM(s) Oral three times a day with meals  calcium carbonate 1250 mG  + Vitamin D (OsCal 500 + D) 1 Tablet(s) Oral three times a day  furosemide   Injectable 80 milliGRAM(s) IV Push two times a day  influenza   Vaccine 0.5 milliLiter(s) IntraMuscular once  insulin lispro (HumaLOG) corrective regimen sliding scale   SubCutaneous three times a day before meals  insulin lispro (HumaLOG) corrective regimen sliding scale   SubCutaneous at bedtime  levothyroxine 175 MICROGram(s) Oral daily  magnesium sulfate  IVPB 1 Gram(s) IV Intermittent once  phytonadione   Solution 5 milliGRAM(s) Oral once    MEDICATIONS  (PRN):      OBJECTIVE FINDINGS:    Vital Signs Last 24 Hrs  T(C): 36.3 (2018 22:20), Max: 36.8 (2018 12:01)  T(F): 97.4 (2018 22:20), Max: 98.2 (2018 12:01)  HR: 90 (2018 00:00) (76 - 94)  BP: 81/64 (2018 00:00) (81/64 - 105/82)  BP(mean): 75 (2018 00:00) (60 - 80)  RR: 18 (2018 00:00) (11 - 30)  SpO2: 97% (2018 00:00) (96% - 100%)    PHYSICAL EXAM:    GENERAL: NAD, well-developed  HEAD:  Atraumatic, Normocephalic  EYES: EOMI, PERRLA, conjunctiva and sclera clear  ENMT: No tonsillar erythema, exudates, or enlargement; Moist mucous membranes, Good dentition, No lesions  NECK: Supple, No JVD, Normal thyroid  NERVOUS SYSTEM:  Alert & Oriented X3, Good concentration; Motor Strength 5/5 B/L upper and lower extremities; DTRs 2+ intact and symmetric  CHEST/LUNG: Clear to percussion bilaterally; No rales, rhonchi, wheezing, or rubs  HEART: Regular rate and rhythm; No murmurs, rubs, or gallops  ABDOMEN: Soft, Nontender, Nondistended; Bowel sounds present, No rebound, No guarding  EXTREMITIES:  2+ Peripheral Pulses, No clubbing, cyanosis, or edema, Rob's sign negative  LYMPH: No lymphadenopathy noted  SKIN: No rashes or lesions  PELVIC:   RECTAL:      LABS:                        12.3   9.2   )-----------( 178      ( 2018 13:24 )             39.0     -    123<L>  |  77<L>  |  72<H>  ----------------------------<  151<H>  4.5   |  22  |  1.98<H>    Ca    5.9<LL>      2018 21:13  Phos  6.6       Mg     1.8         TPro  8.1  /  Alb  3.5  /  TBili  11.7<H>  /  DBili  x   /  AST  120<H>  /  ALT  36  /  AlkPhos  398<H>  11-    PT/INR - ( 2018 15:24 )   PT: 38.4 sec;   INR: 3.22 ratio         PTT - ( 2018 15:24 )  PTT:31.4 sec      RADIOLOGY & ADDITIONAL STUDIES: R2 GYNECOLOGIC CONSULT NOTE (in progress)    63y F , menopausal, with a PMH of CHF, thyroid ca, HTN, DM, presented to the ED today with vaginal bleeding. Pt states that she first started to have vaginal bleeding 3 months ago after her [facility] admission. That episode of vaginal bleeding had resolved and restarted again today. Pt states that she was sitting on a chair and felt a gush of fluid. She got up and noted that she saturated her pants and the chair and estimates that she saturated 1 pad. Pt went to the ED and was admitted volume overload 2/2 an acute exacerbation of CHF, renal and hepatic failure with an INR of 3. Pt currently denies lightheadedness, dizziness, SOB, chest pain. Denies fever, chills, N/V, dizziness.    OB/GYN HISTORY:  Currently does not see an OB/GYN  NSVDx3  cs x1  Denies fibroids, polyps, abl paps, STIs    Surgical History:    AICD (automatic cardioverter/defibrillator) present  S/P thyroidectomy      Past Medical History:   Asthma  CHF (congestive heart failure)  DM (diabetes mellitus)  HTN (hypertension)  Thyroid ca    Allergies:  Isordil (Headache)  penicillin (Rash)    Medications:  aspirin  chewable 81 milliGRAM(s) Oral daily  calcitriol   Capsule 0.5 MICROGram(s) Oral two times a day  calcium acetate 667 milliGRAM(s) Oral three times a day with meals  calcium carbonate 1250 mG  + Vitamin D (OsCal 500 + D) 1 Tablet(s) Oral three times a day  furosemide   Injectable 80 milliGRAM(s) IV Push two times a day  influenza   Vaccine 0.5 milliLiter(s) IntraMuscular once  insulin lispro (HumaLOG) corrective regimen sliding scale   SubCutaneous three times a day before meals  insulin lispro (HumaLOG) corrective regimen sliding scale   SubCutaneous at bedtime  levothyroxine 175 MICROGram(s) Oral daily  magnesium sulfate  IVPB 1 Gram(s) IV Intermittent once  phytonadione   Solution 5 milliGRAM(s) Oral once      FAMILY HISTORY:  No pertinent family history in first degree relatives      Social History:   denies etoh, tobacco, illicit drug use    REVIEW OF SYSTEMS: WNL except as stated in above      MEDICATIONS  (STANDING):  aspirin  chewable 81 milliGRAM(s) Oral daily  calcitriol   Capsule 0.5 MICROGram(s) Oral two times a day  calcium acetate 667 milliGRAM(s) Oral three times a day with meals  calcium carbonate 1250 mG  + Vitamin D (OsCal 500 + D) 1 Tablet(s) Oral three times a day  furosemide   Injectable 80 milliGRAM(s) IV Push two times a day  influenza   Vaccine 0.5 milliLiter(s) IntraMuscular once  insulin lispro (HumaLOG) corrective regimen sliding scale   SubCutaneous three times a day before meals  insulin lispro (HumaLOG) corrective regimen sliding scale   SubCutaneous at bedtime  levothyroxine 175 MICROGram(s) Oral daily  magnesium sulfate  IVPB 1 Gram(s) IV Intermittent once  phytonadione   Solution 5 milliGRAM(s) Oral once      OBJECTIVE FINDINGS:    Vital Signs Last 24 Hrs  T(C): 36.3 (2018 22:20), Max: 36.8 (2018 12:01)  T(F): 97.4 (2018 22:20), Max: 98.2 (2018 12:01)  HR: 90 (2018 00:00) (76 - 94)  BP: 81/64 (2018 00:00) (81/64 - 105/82)  BP(mean): 75 (2018 00:00) (60 - 80)  RR: 18 (2018 00:00) (11 - 30)  SpO2: 97% (2018 00:00) (96% - 100%)    PHYSICAL EXAM:    GENERAL: NAD, sleepy, anisarca,   ABDOMEN: Soft, Nontender, Nondistended; No rebound, No guarding  EXTREMITIES: 2+ Peripheral Pulses, No clubbing, cyanosis, or edema, Rob's sign negative  : no bleeding noted on sheets. Pelvic exam deferred at this time.      LABS:                        12.3   9.2   )-----------( 178      ( 2018 13:24 )             39.0     11-06    123<L>  |  77<L>  |  72<H>  ----------------------------<  151<H>  4.5   |  22  |  1.98<H>    Ca    5.9<LL>      2018 21:13  Phos  6.6     11-06  Mg     1.8     11-    TPro  8.1  /  Alb  3.5  /  TBili  11.7<H>  /  DBili  x   /  AST  120<H>  /  ALT  36  /  AlkPhos  398<H>  11-    PT/INR - ( 2018 15:24 )   PT: 38.4 sec;   INR: 3.22 ratio         PTT - ( 2018 15:24 )  PTT:31.4 sec      RADIOLOGY & ADDITIONAL STUDIES:  < from: US Transvaginal (18 @ 14:03) >  EXAM:  US PELVIC COMPLETE                          EXAM:  US TRANSVAGINAL                            PROCEDURE DATE:  2018            INTERPRETATION:  CLINICAL INFORMATION: 63-year-old postmenopausal female   with vaginal bleeding this morning.    COMPARISON: Centimeters CT abdomen pelvis 2018.    TECHNIQUE:     Endovaginal and transabdominal pelvic sonogram. Color and Spectral   Doppler was performed.        FINDINGS:    Examination is limited by patient body habitus.    Uterus: 10.3 x 4.9 x 6.4 cm. Within normal limits.    Endometrium: 10 mm, abnormally thickened in a postmenopausal woman. Trace   free fluid is noted within the endometrial canal.    Right ovary: Not well visualized.    Left ovary: 1.4 x 1.5 x 1.8 cm. Within normal limits.    Fluid: Trace free fluid in the cul-de-sac. To the right of the lower   uterine body and adjacent to the urinary bladder, there is a discrete   area of free fluid, which likely corresponds with a region of free fluid   seen on CT abdomenpelvis 2018 (601:73, 602:66, 2:107).    IMPRESSION:    Limited examination due to patient body habitus.    < end of copied text >    < from: CT Abdomen and Pelvis No Cont (18 @ 17:12) >  EXAM:  CT ABDOMEN AND PELVIS                            PROCEDURE DATE:  2018            INTERPRETATION:  CLINICAL INFORMATION: Vaginal bleeding; acute renal   failure    COMPARISON: Transvaginal ultrasound performed same day    PROCEDURE:   CT of the Abdomen and Pelvis was performed without intravenous contrast.   Intravenous contrast: None.  Oral contrast: None.  Sagittal and coronal reformats were performed.    FINDINGS:    Evaluation of the intra-abdominal organs is limited without the   administration of IV contrast.    LOWER CHEST: Partially visualized leads overlie the heart. Patchy   opacities in the right lower lobe may reflect infection or aspiration.    LIVER: Within normal limits.  BILE DUCTS: Normal caliber.  GALLBLADDER:Cholelithiasis.  SPLEEN: Within normal limits.  PANCREAS: Within normal limits.  ADRENALS: Within normal limits.  KIDNEYS/URETERS: Within normal limits.    BLADDER: Within normal limits.  REPRODUCTIVE ORGANS: The endometrium is thickened measuring upto 1.3 cm   in greatest dimension with adjacent fluid within the endometrial canal.   No adnexal masses.    BOWEL: No bowel obstruction. Scattered colonic diverticulosis without   evidence of diverticulitis. Appendix is not clearly visualized.        VESSELS:  Mild atherosclerotic changes.  RETROPERITONEUM: No lymphadenopathy.    ABDOMINAL WALL: Small anterior abdominal wall hernia containing fat and a   nonobstructed loop of small bowel. Diffuse anasarca.  BONES: Degenerative changes of the spine.    IMPRESSION:     When compared with pelvic sonogram of same date:    Unchanged endometrial thickening and endometrial free fluid. 2 high   density foci measuring approximately 1.5 cm may represent small fibroids.    < end of copied text >

## 2018-11-07 NOTE — CHART NOTE - NSCHARTNOTEFT_GEN_A_CORE
Procedure note - RIJ triple lumen catheter insertion    - consented obtained prior to procedure  - sterile field  - Seldinger technique with u/s   - minimal blood loss during the procedure  - pt tolerated well with minimal sob which improved with venting from drape  - stat chest x-ray ordered  - c/w dobutamine and abx as pt has poor peripheral access  - discussed and signed out to Dr. Xander Lay, #[id number]  CCU Fellow

## 2018-11-07 NOTE — PROGRESS NOTE ADULT - ATTENDING COMMENTS
Patient is seen and examined with fellow, NP and the CCU house-staff. I agree with the history, physical and the assessment and plan.  known NICM s/p ICD presented with acute on chronic decompensated systolic right and left heart cath  will start dobutamine for inotropic assisted diuresis  Lasix and monitor urine output  structural team evaluation for the MR  f/u with CHF team

## 2018-11-07 NOTE — CONSULT NOTE ADULT - SUBJECTIVE AND OBJECTIVE BOX
EP ATTENDING    History: She is a pleasant 64 y/o female PMH severe NICM (LVEF 10-15%) who had a Medtronic Biv-ICD implanted at [facility]. A recent LHC at [facility] was also unremarkable. She now returns for with a severe decompensation of her NICM (JVP > 20, hepatic congestion, abdominal pain, LE edema and a near 20 lb weight gain). Her device interrogation recently demonstrates excellent RA, RV and LV lead functions, but with frequent episodes of NSVT. She denies palpitations nor high voltage therapy.     PAST MEDICAL & SURGICAL HISTORY:  Asthma  NICM  DM (diabetes mellitus)  HTN (hypertension)    PShx: thyroid CA s/p thyroidectomy, Medtronic BiV-ICD    MEDS:  acetaminophen   Tablet .. 650 milliGRAM(s) Oral every 6 hours PRN  aspirin  chewable 81 milliGRAM(s) Oral daily  calcitriol   Capsule 0.5 MICROGram(s) Oral two times a day  calcium acetate 667 milliGRAM(s) Oral three times a day with meals  calcium carbonate 1250 mG  + Vitamin D (OsCal 500 + D) 1 Tablet(s) Oral three times a day  DOBUTamine Infusion 2.5 MICROgram(s)/kG/Min IV Continuous <Continuous>  furosemide   Injectable 80 milliGRAM(s) IV Push two times a day  influenza   Vaccine 0.5 milliLiter(s) IntraMuscular once  insulin lispro (HumaLOG) corrective regimen sliding scale   SubCutaneous three times a day before meals  insulin lispro (HumaLOG) corrective regimen sliding scale   SubCutaneous at bedtime  levothyroxine 175 MICROGram(s) Oral daily    Allergies: penicillin    FAMILY HISTORY:  No pertinent family history in first degree relatives    SOCIAL HISTORY:    (x ) Non-smoker ( ) Smoker ( ) Alcohol Abuse ( ) IVDA    REVIEW OF SYSTEMS:     CONSTITUTIONAL: No fever, chills, weight loss, or fatigue  RESPIRATORY: No cough, wheezing +SOB, +EDDY, +Orthopnea  CARDIOVASCULAR: No chest pain, palpitations, syncope +LE edema  GASTROINTESTINAL: No nausea, vomiting, diarrhea or constipation. No melena or hematochezia. +abdominal discomfort  GENITOURINARY: No dysuria, frequency, hematuria, or incontinence  NEUROLOGICAL: No headaches, memory loss, loss of strength, numbness, or tremors	    [x ] All others negative	  [ ] Unable to obtain                                11.3   7.2   )-----------( 175      ( 07 Nov 2018 03:59 )             36.1       11-07    123<L>  |  80<L>  |  76<H>  ----------------------------<  150<H>  4.5   |  21<L>  |  1.98<H>    Ca    5.9<LL>      07 Nov 2018 03:59  Phos  6.5     11-07  Mg     2.0     11-07    TPro  7.1  /  Alb  3.1<L>  /  TBili  10.8<H>  /  DBili  x   /  AST  62<H>  /  ALT  32  /  AlkPhos  364<H>  11-07      T(C): 36.4 (11-07-18 @ 08:00), Max: 36.8 (11-06-18 @ 12:01)  HR: 90 (11-07-18 @ 11:00) (76 - 94)  BP: 89/63 (11-07-18 @ 11:00) (78/55 - 106/67)  RR: 15 (11-07-18 @ 11:00) (11 - 30)  SpO2: 98% (11-07-18 @ 11:00) (94% - 100%)  Wt(kg): --    JVP 20  tachy, no murmurs  lungs with rales  soft nt/nd  + 2 edema      EKG: NSR, Biv pacing    A/P) She is a pleasant 64 y/o female PMH severe NICM (LVEF 10-15%) who had a Medtronic Biv-ICD implanted at [facility]. A recent LHC at [facility] was also unremarkable. She now returns for with a severe decompensation of her NICM (JVP > 20, hepatic congestion, abdominal pain, LE edema and a near 20 lb weight gain). Her device interrogation recently demonstrates excellent RA, RV and LV lead functions, but with frequent episodes of NSVT. She denies palpitations nor high voltage therapy.     -admit to CCU  -consider lasix and dobutamine drips  -CHF consult for advanced therapies (VAD vs transplant)  -no need for AAD  -no need to recheck ICD, it was recently optimized by me. Future options including turning off BiV pacing to see if she responds better. This can be addressed when she's euvolemic  -will follow  -she remains critically ill

## 2018-11-07 NOTE — CONSULT NOTE ADULT - PROBLEM SELECTOR RECOMMENDATION 9
-No acute gyn surgical interventions needed at this time  -Recommend endometrial biopsy to r/o malignancy, but INR =3. Awaiting clearance for CCU to perform inpatient EMBx  -Strict pad counts    Pt seen and d/w Dr. Alli Perez PGY2

## 2018-11-07 NOTE — CONSULT NOTE ADULT - SUBJECTIVE AND OBJECTIVE BOX
[facility]  Wale Tovar PA-C  [facility] [place] [address]  [phone]      Chief Complaint:  Patient is a 63y old  Female who presents with a chief complaint of SOB, Vaginal bleeding and weakness. (2018 13:34)      HPI: Ms. Carson is a 63 year-old woman with history of multiple medical issues including hypertension, type 2 diabetes mellitus, thyroid cancer s/p resection, and severe systolic congestive heart failure (EF 10%). She presents today to the [facility] ER with vaginal bleeding that she noticed this morning. Her hemoglobin on presentation was at goal (Hb 12.3), but her INR was ~3; She has chronically elevated lfts, previous serologic workup was negative i had recommended a transjugular liver biopsy on prior admission which she had refused.     Allergies:  Isordil (Headache)  penicillin (Rash)      Medications:  acetaminophen   Tablet .. 650 milliGRAM(s) Oral every 6 hours PRN  aspirin  chewable 81 milliGRAM(s) Oral daily  calcitriol   Capsule 0.5 MICROGram(s) Oral two times a day  calcium acetate 2001 milliGRAM(s) Oral three times a day with meals  calcium carbonate 1250 mG  + Vitamin D (OsCal 500 + D) 1 Tablet(s) Oral three times a day  calcium chloride Injectable 200 milliGRAM(s) IV Push once  cefTRIAXone   IVPB 1 Gram(s) IV Intermittent every 24 hours  chlorhexidine 4% Liquid 1 Application(s) Topical <User Schedule>  DOBUTamine Infusion 2.5 MICROgram(s)/kG/Min IV Continuous <Continuous>  influenza   Vaccine 0.5 milliLiter(s) IntraMuscular once  insulin lispro (HumaLOG) corrective regimen sliding scale   SubCutaneous three times a day before meals  insulin lispro (HumaLOG) corrective regimen sliding scale   SubCutaneous at bedtime  levothyroxine 175 MICROGram(s) Oral daily  nystatin/triamcinolone Cream 1 Application(s) Topical every 12 hours      PMHX/PSHX:  Asthma  CHF (congestive heart failure)  DM (diabetes mellitus)  HTN (hypertension)  Thyroid ca  AICD (automatic cardioverter/defibrillator) present  S/P thyroidectomy      Family history:  No pertinent family history in first degree relatives      Social History:     ROS:     General:  No wt loss, fevers, chills, night sweats, + fatigue,   Eyes:  Good vision, no reported pain  ENT:  No sore throat, pain, runny nose, dysphagia  CV:  No pain, palpitations, hypo/hypertension  Resp:  + dyspnea, cough, tachypnea, wheezing  GI:  No pain, No nausea, No vomiting, No diarrhea, No constipation, No weight loss, No fever, No pruritis, No rectal bleeding, No tarry stools, No dysphagia,  :  No pain, bleeding, incontinence, nocturia  Muscle:  No pain, weakness  Neuro:  No weakness, tingling, memory problems  Psych:  No fatigue, insomnia, mood problems, depression  Endocrine:  No polyuria, polydipsia, cold/heat intolerance  Heme:  No petechiae, ecchymosis, easy bruisability  Skin:  No rash, tattoos, scars, edema      PHYSICAL EXAM:   Vital Signs:  Vital Signs Last 24 Hrs  T(C): 36.4 (2018 13:00), Max: 36.7 (2018 21:24)  T(F): 97.5 (2018 13:00), Max: 98 (2018 21:24)  HR: 94 (2018 19:00) (76 - 94)  BP: 90/70 (2018 19:00) (73/62 - 106/67)  BP(mean): 76 (2018 19:00) (60 - 83)  RR: 11 (2018 19:00) (10 - 30)  SpO2: 96% (2018 19:00) (94% - 100%)  Daily Height in cm: 170.18 (2018 08:00)    Daily     GENERAL:  Appears stated age  HEENT:  NC/AT,    CHEST:  Full & symmetric excursion,   HEART:  + edema  ABDOMEN:  Soft, non-tender,  EXTEREMITIES:  +edema  SKIN:  No rash  NEURO:  Alert, oriented, no asterixis, no tremor, no encephalopathy    LABS:                        11.1   7.7   )-----------( 150      ( 2018 13:01 )             35.1     11-07    122<L>  |  79<L>  |  74<H>  ----------------------------<  156<H>  4.6   |  22  |  2.05<H>    Ca    6.0<LL>      2018 13:01  Phos  6.5       Mg     1.8         TPro  6.6  /  Alb  2.9<L>  /  TBili  10.3<H>  /  DBili  x   /  AST  63<H>  /  ALT  30  /  AlkPhos  336<H>      LIVER FUNCTIONS - ( 2018 13:01 )  Alb: 2.9 g/dL / Pro: 6.6 g/dL / ALK PHOS: 336 U/L / ALT: 30 U/L / AST: 63 U/L / GGT: x           PT/INR - ( 2018 03:59 )   PT: 40.9 sec;   INR: 3.42 ratio         PTT - ( 2018 03:59 )  PTT:31.9 sec  Urinalysis Basic - ( 2018 15:45 )    Color: Dark Yellow / Appearance: Turbid / S.013 / pH: x  Gluc: x / Ketone: Negative  / Bili: Small / Urobili: 2 mg/dL   Blood: x / Protein: Trace / Nitrite: Negative   Leuk Esterase: Large / RBC: 10 /hpf /  /hpf   Sq Epi: x / Non Sq Epi: 1 /hpf / Bacteria: Many          Imaging:

## 2018-11-07 NOTE — CONSULT NOTE ADULT - ASSESSMENT
63y F , menopausal, with a PMH of CHF, thyroid ca, HTN, DM, a/w acute on chronic systolic heart failure, renal failure and liver failure. GYN consulted for postmenopausal vaginal bleeding. Thickened endometrium of 1-1.3 cm with possible small fibroids. H/H stable, VSS. Postmenopausal vaginal bleeding ddx: malignancy vs. endometrial hyperplasia vs. coagulopathy vs. structural causes (fibroids vs polyp).

## 2018-11-07 NOTE — PROGRESS NOTE ADULT - SUBJECTIVE AND OBJECTIVE BOX
INTERVAL HPI/OVERNIGHT EVENTS: My feet still hurting .   Vital Signs Last 24 Hrs  T(C): 36.4 (07 Nov 2018 13:00), Max: 36.7 (06 Nov 2018 21:24)  T(F): 97.5 (07 Nov 2018 13:00), Max: 98 (06 Nov 2018 21:24)  HR: 90 (07 Nov 2018 13:00) (76 - 94)  BP: 79/66 (07 Nov 2018 13:00) (78/55 - 106/67)  BP(mean): 71 (07 Nov 2018 13:00) (60 - 83)  RR: 17 (07 Nov 2018 13:00) (10 - 30)  SpO2: 97% (07 Nov 2018 13:00) (94% - 100%)  I&O's Summary    06 Nov 2018 07:01  -  07 Nov 2018 07:00  --------------------------------------------------------  IN: 200 mL / OUT: 550 mL / NET: -350 mL    07 Nov 2018 07:01  -  07 Nov 2018 13:34  --------------------------------------------------------  IN: 386.5 mL / OUT: 15 mL / NET: 371.5 mL      MEDICATIONS  (STANDING):  aspirin  chewable 81 milliGRAM(s) Oral daily  calcitriol   Capsule 0.5 MICROGram(s) Oral two times a day  calcium acetate 2001 milliGRAM(s) Oral three times a day with meals  calcium carbonate 1250 mG  + Vitamin D (OsCal 500 + D) 1 Tablet(s) Oral three times a day  DOBUTamine Infusion 2.5 MICROgram(s)/kG/Min (7.26 mL/Hr) IV Continuous <Continuous>  influenza   Vaccine 0.5 milliLiter(s) IntraMuscular once  insulin lispro (HumaLOG) corrective regimen sliding scale   SubCutaneous three times a day before meals  insulin lispro (HumaLOG) corrective regimen sliding scale   SubCutaneous at bedtime  levothyroxine 175 MICROGram(s) Oral daily    MEDICATIONS  (PRN):  acetaminophen   Tablet .. 650 milliGRAM(s) Oral every 6 hours PRN Moderate Pain (4 - 6)    LABS:                        11.1   7.7   )-----------( 150      ( 07 Nov 2018 13:01 )             35.1     11-07    122<L>  |  79<L>  |  74<H>  ----------------------------<  156<H>  4.6   |  22  |  2.05<H>    Ca    5.9<LL>      07 Nov 2018 03:59  Phos  6.5     11-07  Mg     1.8     11-07    TPro  6.6  /  Alb  2.9<L>  /  TBili  10.3<H>  /  DBili  x   /  AST  63<H>  /  ALT  30  /  AlkPhos  336<H>  11-07    PT/INR - ( 07 Nov 2018 03:59 )   PT: 40.9 sec;   INR: 3.42 ratio         PTT - ( 07 Nov 2018 03:59 )  PTT:31.9 sec    CAPILLARY BLOOD GLUCOSE      POCT Blood Glucose.: 163 mg/dL (07 Nov 2018 13:02)  POCT Blood Glucose.: 142 mg/dL (07 Nov 2018 08:14)          REVIEW OF SYSTEMS:  CONSTITUTIONAL: No fever, weight loss, or fatigue  EYES: No eye pain, visual disturbances, or discharge  ENMT:  No difficulty hearing, tinnitus, vertigo; No sinus or throat pain  NECK: No pain or stiffness  BREASTS: No pain, masses, or nipple discharge  RESPIRATORY: No cough, wheezing, chills or hemoptysis; No shortness of breath  CARDIOVASCULAR: No chest pain, palpitations, dizziness, or leg swelling  GASTROINTESTINAL: No abdominal or epigastric pain. No nausea, vomiting, or hematemesis; No diarrhea or constipation. No melena or hematochezia.  GENITOURINARY: No dysuria, frequency, hematuria, or incontinence  NEUROLOGICAL: No headaches, memory loss, loss of strength, numbness, or tremors  SKIN: No itching, burning, rashes, or lesions   LYMPH NODES: No enlarged glands  ENDOCRINE: No heat or cold intolerance; No hair loss  MUSCULOSKELETAL: No joint pain or swelling; No muscle, back, or extremity pain  PSYCHIATRIC: No depression, anxiety, mood swings, or difficulty sleeping  HEME/LYMPH: No easy bruising, or bleeding gums  ALLERY AND IMMUNOLOGIC: No hives or eczema    RADIOLOGY & ADDITIONAL TESTS:    Consultant(s) Notes Reviewed:  [x ] YES  [ ] NO    PHYSICAL EXAM:  GENERAL: NAD, well-groomed, well-developed,not in any distress ,  HEAD:  Atraumatic, Normocephalic  EYES: EOMI, PERRLA, conjunctiva and sclera clear  ENMT: No tonsillar erythema, exudates, or enlargement; Moist mucous membranes, Good dentition, No lesions  NECK: Supple, No JVD, Normal thyroid  NERVOUS SYSTEM:  Alert & Oriented X3, No focal deficit   CHEST/LUNG: Good air entry bilateral with no  rales, rhonchi, wheezing, or rubs  HEART: Regular rate and rhythm; No murmurs, rubs, or gallops  ABDOMEN: Soft, Nontender, Nondistended; Bowel sounds present  EXTREMITIES:  2+ Peripheral Pulses, No clubbing, cyanosis, or edema  SKIN: No rashes or lesions    Care Discussed with Consultants/Other Providers [ x] YES  [ ] NO INTERVAL HPI/OVERNIGHT EVENTS: My feet still hurting .   Vital Signs Last 24 Hrs  T(C): 36.4 (07 Nov 2018 13:00), Max: 36.7 (06 Nov 2018 21:24)  T(F): 97.5 (07 Nov 2018 13:00), Max: 98 (06 Nov 2018 21:24)  HR: 90 (07 Nov 2018 13:00) (76 - 94)  BP: 79/66 (07 Nov 2018 13:00) (78/55 - 106/67)  BP(mean): 71 (07 Nov 2018 13:00) (60 - 83)  RR: 17 (07 Nov 2018 13:00) (10 - 30)  SpO2: 97% (07 Nov 2018 13:00) (94% - 100%)  I&O's Summary    06 Nov 2018 07:01  -  07 Nov 2018 07:00  --------------------------------------------------------  IN: 200 mL / OUT: 550 mL / NET: -350 mL    07 Nov 2018 07:01  -  07 Nov 2018 13:34  --------------------------------------------------------  IN: 386.5 mL / OUT: 15 mL / NET: 371.5 mL      MEDICATIONS  (STANDING):  aspirin  chewable 81 milliGRAM(s) Oral daily  calcitriol   Capsule 0.5 MICROGram(s) Oral two times a day  calcium acetate 2001 milliGRAM(s) Oral three times a day with meals  calcium carbonate 1250 mG  + Vitamin D (OsCal 500 + D) 1 Tablet(s) Oral three times a day  DOBUTamine Infusion 2.5 MICROgram(s)/kG/Min (7.26 mL/Hr) IV Continuous <Continuous>  influenza   Vaccine 0.5 milliLiter(s) IntraMuscular once  insulin lispro (HumaLOG) corrective regimen sliding scale   SubCutaneous three times a day before meals  insulin lispro (HumaLOG) corrective regimen sliding scale   SubCutaneous at bedtime  levothyroxine 175 MICROGram(s) Oral daily    MEDICATIONS  (PRN):  acetaminophen   Tablet .. 650 milliGRAM(s) Oral every 6 hours PRN Moderate Pain (4 - 6)    LABS:                        11.1   7.7   )-----------( 150      ( 07 Nov 2018 13:01 )             35.1     11-07    122<L>  |  79<L>  |  74<H>  ----------------------------<  156<H>  4.6   |  22  |  2.05<H>    Ca    5.9<LL>      07 Nov 2018 03:59  Phos  6.5     11-07  Mg     1.8     11-07    TPro  6.6  /  Alb  2.9<L>  /  TBili  10.3<H>  /  DBili  x   /  AST  63<H>  /  ALT  30  /  AlkPhos  336<H>  11-07    PT/INR - ( 07 Nov 2018 03:59 )   PT: 40.9 sec;   INR: 3.42 ratio         PTT - ( 07 Nov 2018 03:59 )  PTT:31.9 sec    CAPILLARY BLOOD GLUCOSE      POCT Blood Glucose.: 163 mg/dL (07 Nov 2018 13:02)  POCT Blood Glucose.: 142 mg/dL (07 Nov 2018 08:14)          REVIEW OF SYSTEMS:  CONSTITUTIONAL: No fever, weight loss, or fatigue  EYES: No eye pain, visual disturbances, or discharge  ENMT:  No difficulty hearing, tinnitus, vertigo; No sinus or throat pain  NECK: No pain or stiffness  RESPIRATORY: No cough, wheezing, chills or hemoptysis;  shortness of breath  CARDIOVASCULAR: No chest pain, palpitations, dizziness, or leg swelling  GASTROINTESTINAL: No abdominal or epigastric pain. No nausea, vomiting, or hematemesis; No diarrhea or constipation. No melena or hematochezia.  GENITOURINARY: No dysuria, frequency, hematuria, or incontinence  NEUROLOGICAL: No headaches, memory loss, loss of strength, numbness, or tremors  ENDOCRINE: No heat or cold intolerance; No hair loss  MUSCULOSKELETAL: feet and ankles hurting     Consultant(s) Notes Reviewed:  [x ] YES  [ ] NO    PHYSICAL EXAM:  GENERAL: NAD, well-groomed, well-developed, not in any distress ,NC oxygen   HEAD:  Atraumatic, Normocephalic  EYES: EOMI, PERRLA, conjunctiva and sclera clear  ENMT: No tonsillar erythema, exudates, or enlargement; Moist mucous membranes, Good dentition, No lesions  NECK: Supple, No JVD, Normal thyroid  NERVOUS SYSTEM:  Alert & Oriented X3, No focal deficit   CHEST/LUNG: Good air entry bilateral except bases with no  rales, rhonchi, wheezing, or rubs  HEART: Regular rate and rhythm; No murmurs, rubs, or gallops  ABDOMEN: Soft, Nontender, Nondistended; Bowel sounds present  EXTREMITIES:  2+ Peripheral Pulses, No clubbing, cyanosis, but +edema  SKIN: No rashes or lesions    Care Discussed with Consultants/Other Providers [ x] YES  [ ] NO

## 2018-11-07 NOTE — PROGRESS NOTE ADULT - ASSESSMENT
64yo post-menopausal F w/ pmh of CHF, thyroid ca, HTN, DM, daily ASA user,  c/o vaginal bleeding that she noticed this morning. Pt states she woke up feeling fatigued with abdominal pressure and that when she sat down, she noticed that she had bled through her underwear. Pt has not experienced any bleeding episodes like this in the past. she admits to normal urinary and bowel functions and denies any HA, dizziness, SOB, CP, N/V/D, constipation. Pt has no known uterine pathologies and has not seen a GYN in 15-20 years. Currently Pt reports fatigue and daughter states she has been in this state for the past couple weeks since she was discharged from hospital for CHF exacerbation. Assessment:  62yo post-menopausal F w/ pmh of NICM, thyroid ca, HTN, DM, daily ASA user, c/o vaginal bleeding x1d and was found to be in acute decompensated HFrEF. Pt has no known uterine pathologies and has not seen a GYN in 15-20 years.      Plan    #Neuro  AAOx4    #Cardiovascular  Acute on chronic decompensated HFrEF c/b severe TR  -Urgent TTE  -start dobutamine 2.5 for inotropic support and to aid diuresis w/t forward flow  -Diuresis w/t 80mg IV lasix and then gtt if no response  -Strict Is/Os  -Trend lactate  -Interrogate ICD  -Consult HF & Structural cards  -Will place a CVC    -Interventional cards recs: No indication for ischemic eval at this time.  -EP recs: once euvolemic, may try turning off BiV pacing to see if she responds better    #Pulmonary  Cough likely 2/2 acute CHF  -Keep head of bed above 30degrees  -O2 support PRN to maintain goal SpO2 > 90    #Renal  MAGNUS on chronic CKD 2/2 prerenal azotemia 2/2 ADHF   -Dobutamine as above to increase EABP seen by [facility]  -Strict I/Os  -Renally dose meds to GFR 20-30    Hyponatremia  -Improving at an appropriate rate  - likely to help  -will monitor Q12 at least    Hyperkalemia   -improved  -Hold entresto and KCl tabs    Hypocalcemia  -s/p 1g IV Ca  -c/w CaCO3 & calcitriol supps    Hyperphosphatemia  -increase phoslo to 2001mg tid w/t meals    #Infectious disease  No active issues    #Hematology/Oncology  Elevated INR 2/2 congestive hepatopathy 2/2 CHF  -CHF Tx as above    Mild acute blood loss anemia 2/2 vaginal bleed  -Pelvic US showing thickened endometrial stripe  GYN recs: endometrial bx once INR improves    #Gastrointestinal  Congestive hepatopathy 2/2 CHF  -CHF Tx as above    #Endocrine  T2DM  -Hba1c  -FSG Q6H  -SSI    #Psychiatric  No active issues    #FEN  Carb consistent DASH diet    #PPx  SCDs 2/2 elevated INR    Emmett Tesfaye MD  Internal Medicine, PGY-1  Pager [phone]

## 2018-11-07 NOTE — PROCEDURE NOTE - ADDITIONAL PROCEDURE DETAILS
call to interrogate device to check arrhythmias  normal sensing and pacing thresholds , stable lead impedances  AT AF burden <0.1 % ,optivol fluid index below impedence line indicating no fluid accumulation call to interrogate device to check arrhythmias  normal sensing and pacing thresholds , stable lead impedances  AT AF burden <0.1 % ,optivol fluid index below impedence line indicating no fluid accumulation  No stored data for review

## 2018-11-07 NOTE — CONSULT NOTE ADULT - PROBLEM SELECTOR RECOMMENDATION 9
diet as tolerated  suspect component of congestive hepatopathy but prior imaging unimpressive  bilirubin is higher than expected  would avoid hepatotoxic agents including statin  no need to repeat serologic workup  dobutamine assisted diuresis per cardiology  may consider transjugular liver biopsy once more stable

## 2018-11-07 NOTE — PROGRESS NOTE ADULT - ASSESSMENT
62yo post-menopausal F w/ pmh of CHF, thyroid ca, HTN, DM, daily ASA user,  c/o vaginal bleeding that she noticed this morning. Pt states she woke up feeling fatigued with abdominal pressure and that when she sat down, she noticed that she had bled through her underwear. Pt has not experienced any bleeding episodes like this in the past. she admits to normal urinary and bowel functions and denies any HA, dizziness, SOB, CP, N/V/D, constipation. Pt has no known uterine pathologies and has not seen a GYN in 15-20 years. Currently Pt reports fatigue and daughter states she has been in this state for the past couple weeks since she was discharged from hospital for CHF exacerbation.      Problem/Plan - 1:  ·  Problem: Acute on chronic systolic congestive heart failure.  Plan: IV Lasix . Cardiology and CHF team helping.      Problem/Plan - 2:  ·  Problem: Acute renal failure.  Plan: Creatinine high Renal following. . Has been Taking NSAID prn also.      Problem/Plan - 3:  ·  Problem: Hyperkalemia.  Plan: Corrected .     Problem/Plan - 4:  ·  Problem: Hyponatremia.  Plan: Still low. Management per renal.      Problem/Plan - 5:  ·  Problem:  Hypocalcemia.  Plan: Chronic . Replacing IV and PO ..      Problem/Plan - 6:  Problem:  Jaundice, hepatocellular. Plan: Likely from CHF . Monitoring LFT      Problem/Plan - 7:  ·  Problem: DM (diabetes mellitus).  Plan: SSI and Lantus . Sugars in good control.      Problem/Plan - 8:  ·  Problem: Coagulopathy .  Plan: Sec to Liver injury. Can be Given Vitamin K.      Problem/Plan - 9:  ·  Problem: Hypothyroidism.  Plan: Synthroid home dose and will check TFT in am.      Problem/Plan - 10:  Problem: Vaginal bleeding. Plan; GYn consult consulted and awaiting Endometrial BX.

## 2018-11-07 NOTE — PROGRESS NOTE ADULT - SUBJECTIVE AND OBJECTIVE BOX
INTERVAL HISTORY:    REVIEW OF SYSTEMS: As above; all others negative    MEDICATIONS  (STANDING):  aspirin  chewable 81 milliGRAM(s) Oral daily  calcitriol   Capsule 0.5 MICROGram(s) Oral two times a day  calcium acetate 667 milliGRAM(s) Oral three times a day with meals  calcium carbonate 1250 mG  + Vitamin D (OsCal 500 + D) 1 Tablet(s) Oral three times a day  furosemide   Injectable 80 milliGRAM(s) IV Push two times a day  influenza   Vaccine 0.5 milliLiter(s) IntraMuscular once  insulin lispro (HumaLOG) corrective regimen sliding scale   SubCutaneous three times a day before meals  insulin lispro (HumaLOG) corrective regimen sliding scale   SubCutaneous at bedtime  levothyroxine 175 MICROGram(s) Oral daily    MEDICATIONS  (PRN):  acetaminophen   Tablet .. 650 milliGRAM(s) Oral every 6 hours PRN Moderate Pain (4 - 6)      Objective:  ICU Vital Signs Last 24 Hrs  T(C): 36.3 (06 Nov 2018 22:20), Max: 36.8 (06 Nov 2018 12:01)  T(F): 97.4 (06 Nov 2018 22:20), Max: 98.2 (06 Nov 2018 12:01)  HR: 90 (07 Nov 2018 07:00) (76 - 94)  BP: 78/63 (07 Nov 2018 07:00) (78/60 - 105/82)  BP(mean): 68 (07 Nov 2018 07:00) (60 - 80)  ABP: --  ABP(mean): --  RR: 11 (07 Nov 2018 07:00) (11 - 30)  SpO2: 98% (07 Nov 2018 07:00) (95% - 100%)      Adult Advanced Hemodynamics Last 24 Hrs  CVP(mm Hg): --  CVP(cm H2O): --  CO: --  CI: --  PA: --  PA(mean): --  PCWP: --  SVR: --  SVRI: --  PVR: --  PVRI: --      11-06 @ 07:01  -  11-07 @ 07:00  --------------------------------------------------------  IN: 200 mL / OUT: 550 mL / NET: -350 mL      Daily     Daily     PHYSICAL EXAM:      Constitutional:    HEENT:    Respiratory:    Cardiovascular:    Gastrointestinal:    Genitourinary:    Extremities:    Vascular:    Neurological:    Skin:      TELEMETRY:     EKG:     IMAGING:    Labs:                          11.3   7.2   )-----------( 175      ( 07 Nov 2018 03:59 )             36.1     11-07    123<L>  |  80<L>  |  76<H>  ----------------------------<  150<H>  4.5   |  21<L>  |  1.98<H>    Ca    5.9<LL>      07 Nov 2018 03:59  Phos  6.5     11-07  Mg     2.0     11-07    TPro  7.1  /  Alb  3.1<L>  /  TBili  10.8<H>  /  DBili  x   /  AST  62<H>  /  ALT  32  /  AlkPhos  364<H>  11-07    LIVER FUNCTIONS - ( 07 Nov 2018 03:59 )  Alb: 3.1 g/dL / Pro: 7.1 g/dL / ALK PHOS: 364 U/L / ALT: 32 U/L / AST: 62 U/L / GGT: x           PT/INR - ( 07 Nov 2018 03:59 )   PT: 40.9 sec;   INR: 3.42 ratio         PTT - ( 07 Nov 2018 03:59 )  PTT:31.9 sec        HEALTH ISSUES - PROBLEM Dx:  Postmenopausal bleeding: Postmenopausal bleeding  Vaginal bleeding: Vaginal bleeding  Hypothyroidism: Hypothyroidism  HTN (hypertension): HTN (hypertension)  DM (diabetes mellitus): DM (diabetes mellitus)  Hyponatremia: Hyponatremia  Hyperkalemia: Hyperkalemia  Acute renal failure: Acute renal failure  Acute on chronic systolic congestive heart failure: Acute on chronic systolic congestive heart failure INTERVAL HISTORY:   No CP,     REVIEW OF SYSTEMS: As above; all others negative    MEDICATIONS  (STANDING):  aspirin  chewable 81 milliGRAM(s) Oral daily  calcitriol   Capsule 0.5 MICROGram(s) Oral two times a day  calcium acetate 667 milliGRAM(s) Oral three times a day with meals  calcium carbonate 1250 mG  + Vitamin D (OsCal 500 + D) 1 Tablet(s) Oral three times a day  furosemide   Injectable 80 milliGRAM(s) IV Push two times a day  influenza   Vaccine 0.5 milliLiter(s) IntraMuscular once  insulin lispro (HumaLOG) corrective regimen sliding scale   SubCutaneous three times a day before meals  insulin lispro (HumaLOG) corrective regimen sliding scale   SubCutaneous at bedtime  levothyroxine 175 MICROGram(s) Oral daily    MEDICATIONS  (PRN):  acetaminophen   Tablet .. 650 milliGRAM(s) Oral every 6 hours PRN Moderate Pain (4 - 6)      Objective:  ICU Vital Signs Last 24 Hrs  T(C): 36.3 (06 Nov 2018 22:20), Max: 36.8 (06 Nov 2018 12:01)  T(F): 97.4 (06 Nov 2018 22:20), Max: 98.2 (06 Nov 2018 12:01)  HR: 90 (07 Nov 2018 07:00) (76 - 94)  BP: 78/63 (07 Nov 2018 07:00) (78/60 - 105/82)  BP(mean): 68 (07 Nov 2018 07:00) (60 - 80)  ABP: --  ABP(mean): --  RR: 11 (07 Nov 2018 07:00) (11 - 30)  SpO2: 98% (07 Nov 2018 07:00) (95% - 100%)      Adult Advanced Hemodynamics Last 24 Hrs  CVP(mm Hg): --  CVP(cm H2O): --  CO: --  CI: --  PA: --  PA(mean): --  PCWP: --  SVR: --  SVRI: --  PVR: --  PVRI: --      11-06 @ 07:01  -  11-07 @ 07:00  --------------------------------------------------------  IN: 200 mL / OUT: 550 mL / NET: -350 mL      Daily     Daily         TELEMETRY:     EKG:     IMAGING:    Labs:                          11.3   7.2   )-----------( 175      ( 07 Nov 2018 03:59 )             36.1     11-07    123<L>  |  80<L>  |  76<H>  ----------------------------<  150<H>  4.5   |  21<L>  |  1.98<H>    Ca    5.9<LL>      07 Nov 2018 03:59  Phos  6.5     11-07  Mg     2.0     11-07    TPro  7.1  /  Alb  3.1<L>  /  TBili  10.8<H>  /  DBili  x   /  AST  62<H>  /  ALT  32  /  AlkPhos  364<H>  11-07    LIVER FUNCTIONS - ( 07 Nov 2018 03:59 )  Alb: 3.1 g/dL / Pro: 7.1 g/dL / ALK PHOS: 364 U/L / ALT: 32 U/L / AST: 62 U/L / GGT: x           PT/INR - ( 07 Nov 2018 03:59 )   PT: 40.9 sec;   INR: 3.42 ratio         PTT - ( 07 Nov 2018 03:59 )  PTT:31.9 sec        HEALTH ISSUES - PROBLEM Dx:  Postmenopausal bleeding: Postmenopausal bleeding  Vaginal bleeding: Vaginal bleeding  Hypothyroidism: Hypothyroidism  HTN (hypertension): HTN (hypertension)  DM (diabetes mellitus): DM (diabetes mellitus)  Hyponatremia: Hyponatremia  Hyperkalemia: Hyperkalemia  Acute renal failure: Acute renal failure  Acute on chronic systolic congestive heart failure: Acute on chronic systolic congestive heart failure INTERVAL HISTORY:   Initially overcorrected Na, now stable increase. Having ongoing cough for several weeks. Especially wakes up at night coughing. Cannot lay flat on her back without feeling "uncomfortable" since March. Cannot go up a flight of stairs or walk 1 block w/t EDDY. No CP, SOB, palpitations, N/V, diaphoresis, syncope, lightheadedness, or abdominal pain.      REVIEW OF SYSTEMS: As above; all others negative    MEDICATIONS  (STANDING):  aspirin  chewable 81 milliGRAM(s) Oral daily  calcitriol   Capsule 0.5 MICROGram(s) Oral two times a day  calcium acetate 667 milliGRAM(s) Oral three times a day with meals  calcium carbonate 1250 mG  + Vitamin D (OsCal 500 + D) 1 Tablet(s) Oral three times a day  furosemide   Injectable 80 milliGRAM(s) IV Push two times a day  influenza   Vaccine 0.5 milliLiter(s) IntraMuscular once  insulin lispro (HumaLOG) corrective regimen sliding scale   SubCutaneous three times a day before meals  insulin lispro (HumaLOG) corrective regimen sliding scale   SubCutaneous at bedtime  levothyroxine 175 MICROGram(s) Oral daily    MEDICATIONS  (PRN):  acetaminophen   Tablet .. 650 milliGRAM(s) Oral every 6 hours PRN Moderate Pain (4 - 6)      Objective:  ICU Vital Signs Last 24 Hrs  T(C): 36.3 (06 Nov 2018 22:20), Max: 36.8 (06 Nov 2018 12:01)  T(F): 97.4 (06 Nov 2018 22:20), Max: 98.2 (06 Nov 2018 12:01)  HR: 90 (07 Nov 2018 07:00) (76 - 94)  BP: 78/63 (07 Nov 2018 07:00) (78/60 - 105/82)  BP(mean): 68 (07 Nov 2018 07:00) (60 - 80)  ABP: --  ABP(mean): --  RR: 11 (07 Nov 2018 07:00) (11 - 30)  SpO2: 98% (07 Nov 2018 07:00) (95% - 100%)      Adult Advanced Hemodynamics Last 24 Hrs  CVP(mm Hg): --  CVP(cm H2O): --  CO: --  CI: --  PA: --  PA(mean): --  PCWP: --  SVR: --  SVRI: --  PVR: --  PVRI: --      11-06 @ 07:01  -  11-07 @ 07:00  --------------------------------------------------------  IN: 200 mL / OUT: 550 mL / NET: -350 mL      Daily     Daily     PHYSICAL EXAM:  GENERAL: NAD, well-developed  HEAD:  Atraumatic, Normocephalic  EYES: EOMI, PERRLA, conjunctiva and sclera clear  NECK: Supple, marked JVD up to the ear  CHEST/LUNG: Crackles up to mid lung fields  HEART: NL s1s2, regular rate w/t intermittent ectopic beats; No murmurs, rubs, or gallops  ABDOMEN: Soft, Nontender, Nondistended; Bowel sounds present  EXTREMITIES:  2+ Peripheral Pulses. 2+ Pitting edema to the knees, 1+ from the knees to the pelvis.    PSYCH: AAOx3  NEUROLOGY: non-focal  SKIN: No rashes or lesions    TELEMETRY: V-paced by ICD    EKG:     IMAGING:    Labs:                          11.3   7.2   )-----------( 175      ( 07 Nov 2018 03:59 )             36.1     11-07    123<L>  |  80<L>  |  76<H>  ----------------------------<  150<H>  4.5   |  21<L>  |  1.98<H>    Ca    5.9<LL>      07 Nov 2018 03:59  Phos  6.5     11-07  Mg     2.0     11-07    TPro  7.1  /  Alb  3.1<L>  /  TBili  10.8<H>  /  DBili  x   /  AST  62<H>  /  ALT  32  /  AlkPhos  364<H>  11-07    LIVER FUNCTIONS - ( 07 Nov 2018 03:59 )  Alb: 3.1 g/dL / Pro: 7.1 g/dL / ALK PHOS: 364 U/L / ALT: 32 U/L / AST: 62 U/L / GGT: x           PT/INR - ( 07 Nov 2018 03:59 )   PT: 40.9 sec;   INR: 3.42 ratio         PTT - ( 07 Nov 2018 03:59 )  PTT:31.9 sec        HEALTH ISSUES - PROBLEM Dx:  Postmenopausal bleeding: Postmenopausal bleeding  Vaginal bleeding: Vaginal bleeding  Hypothyroidism: Hypothyroidism  HTN (hypertension): HTN (hypertension)  DM (diabetes mellitus): DM (diabetes mellitus)  Hyponatremia: Hyponatremia  Hyperkalemia: Hyperkalemia  Acute renal failure: Acute renal failure  Acute on chronic systolic congestive heart failure: Acute on chronic systolic congestive heart failure

## 2018-11-07 NOTE — CONSULT NOTE ADULT - PROBLEM SELECTOR RECOMMENDATION 9
continue  2.5  if does not improve, recommend RHC with IABP for MCS as bridge to possibly palliative inotropic therapy  - start loop diuretic infusion continue  2.5 - recommend RHC with IABP for MCS as bridge to possibly palliative inotropic therapy  - start loop diuretic infusion

## 2018-11-07 NOTE — CONSULT NOTE ADULT - ASSESSMENT
The Pt is a 61 y/o woman with h/o NICM (LVEF 20%, LVIDd 6.5 cm), initially identified in 2013, s/p CRT-D (6/2017), Mod-Severe MR, Severe TR, HTN, HLD, DM II who presented to the ED with c/o vaginal bleeding in the setting of an elevated INR, which was likely the result of Congestive Hepatopathy. She has multiple findings that suggest that she is in Cardiogenic Shock, and has been started on empiric inotropic support. The Pt is a 63 y/o woman with h/o NICM (LVEF 20%, LVIDd 6.5 cm), initially identified in 2013, s/p CRT-D (6/2017), Mod-Severe MR, Severe TR, HTN, HLD, DM II who presented to the ED with c/o vaginal bleeding in the setting of an elevated INR, which was likely the result of Congestive Hepatopathy. She has multiple findings that suggest that she is in Cardiogenic Shock complicated by UTI, and has been started on empiric inotropic support.

## 2018-11-07 NOTE — PROCEDURE NOTE - SUPERVISORY STATEMENT
EP ATTENDING    Agree with BiV-ICD interrogation report. Demonstrates excellent and normal function.

## 2018-11-08 DIAGNOSIS — E20.9 HYPOPARATHYROIDISM, UNSPECIFIED: ICD-10-CM

## 2018-11-08 DIAGNOSIS — E89.0 POSTPROCEDURAL HYPOTHYROIDISM: ICD-10-CM

## 2018-11-08 DIAGNOSIS — I34.0 NONRHEUMATIC MITRAL (VALVE) INSUFFICIENCY: ICD-10-CM

## 2018-11-08 DIAGNOSIS — E11.9 TYPE 2 DIABETES MELLITUS WITHOUT COMPLICATIONS: ICD-10-CM

## 2018-11-08 LAB
ALBUMIN SERPL ELPH-MCNC: 3.1 G/DL — LOW (ref 3.3–5)
ALP SERPL-CCNC: 338 U/L — HIGH (ref 40–120)
ALT FLD-CCNC: 27 U/L — SIGNIFICANT CHANGE UP (ref 10–45)
ANION GAP SERPL CALC-SCNC: 19 MMOL/L — HIGH (ref 5–17)
APTT BLD: 29.2 SEC — SIGNIFICANT CHANGE UP (ref 27.5–36.3)
AST SERPL-CCNC: 56 U/L — HIGH (ref 10–40)
BASOPHILS # BLD AUTO: 0 K/UL — SIGNIFICANT CHANGE UP (ref 0–0.2)
BASOPHILS NFR BLD AUTO: 0.4 % — SIGNIFICANT CHANGE UP (ref 0–2)
BILIRUB SERPL-MCNC: 10.2 MG/DL — HIGH (ref 0.2–1.2)
BUN SERPL-MCNC: 67 MG/DL — HIGH (ref 7–23)
CA-I BLD-SCNC: 0.7 MMOL/L — CRITICAL LOW (ref 1.12–1.3)
CALCIUM SERPL-MCNC: 6.5 MG/DL — CRITICAL LOW (ref 8.4–10.5)
CHLORIDE SERPL-SCNC: 79 MMOL/L — LOW (ref 96–108)
CHOLEST SERPL-MCNC: 79 MG/DL — SIGNIFICANT CHANGE UP (ref 10–199)
CO2 SERPL-SCNC: 28 MMOL/L — SIGNIFICANT CHANGE UP (ref 22–31)
CREAT SERPL-MCNC: 1.83 MG/DL — HIGH (ref 0.5–1.3)
EOSINOPHIL # BLD AUTO: 0.1 K/UL — SIGNIFICANT CHANGE UP (ref 0–0.5)
EOSINOPHIL NFR BLD AUTO: 1 % — SIGNIFICANT CHANGE UP (ref 0–6)
GLUCOSE BLDC GLUCOMTR-MCNC: 127 MG/DL — HIGH (ref 70–99)
GLUCOSE BLDC GLUCOMTR-MCNC: 157 MG/DL — HIGH (ref 70–99)
GLUCOSE SERPL-MCNC: 165 MG/DL — HIGH (ref 70–99)
HBA1C BLD-MCNC: 6 % — HIGH (ref 4–5.6)
HCT VFR BLD CALC: 32.5 % — LOW (ref 34.5–45)
HDLC SERPL-MCNC: 6 MG/DL — LOW
HGB BLD-MCNC: 10.9 G/DL — LOW (ref 11.5–15.5)
INR BLD: 3.03 RATIO — HIGH (ref 0.88–1.16)
LACTATE SERPL-SCNC: 1.9 MMOL/L — SIGNIFICANT CHANGE UP (ref 0.7–2)
LIPID PNL WITH DIRECT LDL SERPL: 59 MG/DL — SIGNIFICANT CHANGE UP
LYMPHOCYTES # BLD AUTO: 0.6 K/UL — LOW (ref 1–3.3)
LYMPHOCYTES # BLD AUTO: 7.9 % — LOW (ref 13–44)
MAGNESIUM SERPL-MCNC: 1.7 MG/DL — SIGNIFICANT CHANGE UP (ref 1.6–2.6)
MCHC RBC-ENTMCNC: 24.2 PG — LOW (ref 27–34)
MCHC RBC-ENTMCNC: 33.7 GM/DL — SIGNIFICANT CHANGE UP (ref 32–36)
MCV RBC AUTO: 71.8 FL — LOW (ref 80–100)
MONOCYTES # BLD AUTO: 0.7 K/UL — SIGNIFICANT CHANGE UP (ref 0–0.9)
MONOCYTES NFR BLD AUTO: 9.3 % — SIGNIFICANT CHANGE UP (ref 2–14)
NEUTROPHILS # BLD AUTO: 6.3 K/UL — SIGNIFICANT CHANGE UP (ref 1.8–7.4)
NEUTROPHILS NFR BLD AUTO: 81.4 % — HIGH (ref 43–77)
NT-PROBNP SERPL-SCNC: 2414 PG/ML — HIGH (ref 0–300)
PHOSPHATE SERPL-MCNC: 6.1 MG/DL — HIGH (ref 2.5–4.5)
PLATELET # BLD AUTO: 148 K/UL — LOW (ref 150–400)
POTASSIUM SERPL-MCNC: 3.8 MMOL/L — SIGNIFICANT CHANGE UP (ref 3.5–5.3)
POTASSIUM SERPL-SCNC: 3.8 MMOL/L — SIGNIFICANT CHANGE UP (ref 3.5–5.3)
PROT SERPL-MCNC: 6.6 G/DL — SIGNIFICANT CHANGE UP (ref 6–8.3)
PROTHROM AB SERPL-ACNC: 35.8 SEC — HIGH (ref 10–12.9)
PTH-INTACT FLD-MCNC: 15 PG/ML — SIGNIFICANT CHANGE UP (ref 15–65)
RBC # BLD: 4.52 M/UL — SIGNIFICANT CHANGE UP (ref 3.8–5.2)
RBC # FLD: 22.7 % — HIGH (ref 10.3–14.5)
SODIUM SERPL-SCNC: 126 MMOL/L — LOW (ref 135–145)
TOTAL CHOLESTEROL/HDL RATIO MEASUREMENT: 13.2 RATIO — HIGH (ref 3.3–7.1)
TRIGL SERPL-MCNC: 69 MG/DL — SIGNIFICANT CHANGE UP (ref 10–149)
WBC # BLD: 7.7 K/UL — SIGNIFICANT CHANGE UP (ref 3.8–10.5)
WBC # FLD AUTO: 7.7 K/UL — SIGNIFICANT CHANGE UP (ref 3.8–10.5)

## 2018-11-08 PROCEDURE — 99254 IP/OBS CNSLTJ NEW/EST MOD 60: CPT | Mod: GC

## 2018-11-08 PROCEDURE — 71045 X-RAY EXAM CHEST 1 VIEW: CPT | Mod: 26

## 2018-11-08 PROCEDURE — 93010 ELECTROCARDIOGRAM REPORT: CPT

## 2018-11-08 PROCEDURE — 99291 CRITICAL CARE FIRST HOUR: CPT

## 2018-11-08 RX ORDER — PHYTONADIONE (VIT K1) 5 MG
5 TABLET ORAL ONCE
Qty: 0 | Refills: 0 | Status: COMPLETED | OUTPATIENT
Start: 2018-11-08 | End: 2018-11-08

## 2018-11-08 RX ORDER — MAGNESIUM OXIDE 400 MG ORAL TABLET 241.3 MG
400 TABLET ORAL ONCE
Qty: 0 | Refills: 0 | Status: COMPLETED | OUTPATIENT
Start: 2018-11-08 | End: 2018-11-08

## 2018-11-08 RX ORDER — BUMETANIDE 0.25 MG/ML
2 INJECTION INTRAMUSCULAR; INTRAVENOUS
Qty: 0 | Refills: 0 | Status: DISCONTINUED | OUTPATIENT
Start: 2018-11-08 | End: 2018-11-08

## 2018-11-08 RX ORDER — CALCIUM CHLORIDE
200 POWDER (GRAM) MISCELLANEOUS ONCE
Qty: 0 | Refills: 0 | Status: COMPLETED | OUTPATIENT
Start: 2018-11-08 | End: 2018-11-08

## 2018-11-08 RX ORDER — INSULIN LISPRO 100/ML
VIAL (ML) SUBCUTANEOUS
Qty: 0 | Refills: 0 | Status: DISCONTINUED | OUTPATIENT
Start: 2018-11-08 | End: 2018-11-23

## 2018-11-08 RX ORDER — INSULIN LISPRO 100/ML
VIAL (ML) SUBCUTANEOUS AT BEDTIME
Qty: 0 | Refills: 0 | Status: DISCONTINUED | OUTPATIENT
Start: 2018-11-08 | End: 2018-11-23

## 2018-11-08 RX ORDER — MAGNESIUM SULFATE 500 MG/ML
2 VIAL (ML) INJECTION ONCE
Qty: 0 | Refills: 0 | Status: COMPLETED | OUTPATIENT
Start: 2018-11-08 | End: 2018-11-08

## 2018-11-08 RX ORDER — MILRINONE LACTATE 1 MG/ML
0.25 INJECTION, SOLUTION INTRAVENOUS
Qty: 20 | Refills: 0 | Status: DISCONTINUED | OUTPATIENT
Start: 2018-11-08 | End: 2018-11-16

## 2018-11-08 RX ORDER — BUMETANIDE 0.25 MG/ML
2 INJECTION INTRAMUSCULAR; INTRAVENOUS
Qty: 0 | Refills: 0 | Status: DISCONTINUED | OUTPATIENT
Start: 2018-11-08 | End: 2018-11-09

## 2018-11-08 RX ORDER — POTASSIUM CHLORIDE 20 MEQ
20 PACKET (EA) ORAL ONCE
Qty: 0 | Refills: 0 | Status: COMPLETED | OUTPATIENT
Start: 2018-11-08 | End: 2018-11-08

## 2018-11-08 RX ADMIN — Medication 81 MILLIGRAM(S): at 12:55

## 2018-11-08 RX ADMIN — CALCITRIOL 0.5 MICROGRAM(S): 0.5 CAPSULE ORAL at 05:02

## 2018-11-08 RX ADMIN — Medication 1: at 08:17

## 2018-11-08 RX ADMIN — Medication 2 TABLET(S): at 22:48

## 2018-11-08 RX ADMIN — Medication 20 MILLIEQUIVALENT(S): at 05:02

## 2018-11-08 RX ADMIN — CALCITRIOL 0.5 MICROGRAM(S): 0.5 CAPSULE ORAL at 18:05

## 2018-11-08 RX ADMIN — Medication 200 MILLIGRAM(S): at 05:45

## 2018-11-08 RX ADMIN — Medication 1 TABLET(S): at 05:02

## 2018-11-08 RX ADMIN — Medication 1 TABLET(S): at 12:56

## 2018-11-08 RX ADMIN — Medication 50 GRAM(S): at 06:26

## 2018-11-08 RX ADMIN — MILRINONE LACTATE 7.26 MICROGRAM(S)/KG/MIN: 1 INJECTION, SOLUTION INTRAVENOUS at 13:17

## 2018-11-08 RX ADMIN — Medication 5 MILLIGRAM(S): at 08:10

## 2018-11-08 RX ADMIN — CHLORHEXIDINE GLUCONATE 1 APPLICATION(S): 213 SOLUTION TOPICAL at 22:47

## 2018-11-08 RX ADMIN — Medication 175 MICROGRAM(S): at 05:02

## 2018-11-08 RX ADMIN — Medication 2001 MILLIGRAM(S): at 18:05

## 2018-11-08 RX ADMIN — MAGNESIUM OXIDE 400 MG ORAL TABLET 400 MILLIGRAM(S): 241.3 TABLET ORAL at 08:10

## 2018-11-08 RX ADMIN — MILRINONE LACTATE 7.26 MICROGRAM(S)/KG/MIN: 1 INJECTION, SOLUTION INTRAVENOUS at 22:59

## 2018-11-08 RX ADMIN — BUMETANIDE 2 MILLIGRAM(S): 0.25 INJECTION INTRAMUSCULAR; INTRAVENOUS at 18:05

## 2018-11-08 RX ADMIN — CHLORHEXIDINE GLUCONATE 1 APPLICATION(S): 213 SOLUTION TOPICAL at 04:44

## 2018-11-08 RX ADMIN — Medication 7.26 MICROGRAM(S)/KG/MIN: at 08:10

## 2018-11-08 RX ADMIN — Medication 2001 MILLIGRAM(S): at 12:55

## 2018-11-08 RX ADMIN — Medication 2001 MILLIGRAM(S): at 08:10

## 2018-11-08 RX ADMIN — CEFTRIAXONE 100 GRAM(S): 500 INJECTION, POWDER, FOR SOLUTION INTRAMUSCULAR; INTRAVENOUS at 16:37

## 2018-11-08 NOTE — PROGRESS NOTE ADULT - ASSESSMENT
62yo post-menopausal F w/ pmh of CHF, thyroid ca, HTN, DM, daily ASA user,  c/o vaginal bleeding that she noticed this morning. Pt states she woke up feeling fatigued with abdominal pressure and that when she sat down, she noticed that she had bled through her underwear. Pt has not experienced any bleeding episodes like this in the past. she admits to normal urinary and bowel functions and denies any HA, dizziness, SOB, CP, N/V/D, constipation. Pt has no known uterine pathologies and has not seen a GYN in 15-20 years. Currently Pt reports fatigue and daughter states she has been in this state for the past couple weeks since she was discharged from hospital for CHF exacerbation.      Problem/Plan - 1:  ·  Problem: Acute on chronic systolic congestive heart failure.  Plan: IV Bumex and Dobutamine  . Cardiology and CHF team helping.      Problem/Plan - 2:  ·  Problem: Acute renal failure.  Plan: Creatinine high Renal following. . Has been Taking NSAID prn also.      Problem/Plan - 3:  ·  Problem: Hyperkalemia.  Plan: Corrected .     Problem/Plan - 4:  ·  Problem: Hyponatremia.  Plan: Still low. Management per renal.      Problem/Plan - 5:  ·  Problem:  Hypocalcemia.  Plan: Chronic . Replacing IV and PO ..      Problem/Plan - 6:  Problem:  Jaundice, hepatocellular. Plan: Likely from CHF . Monitoring LFT      Problem/Plan - 7:  ·  Problem: DM (diabetes mellitus).  Plan: SSI and Lantus . Sugars in good control.      Problem/Plan - 8:  ·  Problem: UTI .  Plan: IV Abxs     Problem/Plan - 9:  ·  Problem: Hypothyroidism.  Plan: Synthroid home dose and will check TFT in am.      Problem/Plan - 10:  Problem: Vaginal bleeding. Plan; GYn consult consulted and awaiting Endometrial BX.       Problem/Plan - 11:  ·  Problem: Coagulopathy .  Plan: Sec to Liver injury. Can be Given Vitamin K.

## 2018-11-08 NOTE — PROGRESS NOTE ADULT - ASSESSMENT
Assessment:  62yo post-menopausal F w/ pmh of NICM, thyroid ca, HTN, DM, daily ASA user, c/o vaginal bleeding x1d and was found to be in acute decompensated HFrEF. Pt has no known uterine pathologies and has not seen a GYN in 15-20 years.      Plan    #Neuro  AAOx4    #Cardiovascular  Acute on chronic decompensated HFrEF c/b severe TR  -Urgent TTE  -start dobutamine 2.5 for inotropic support and to aid diuresis w/t forward flow  -Diuresis w/t 80mg IV lasix and then gtt if no response  -Strict Is/Os  -Trend lactate  -Interrogate ICD  -Consult HF & Structural cards  -Will place a CVC    -Interventional cards recs: No indication for ischemic eval at this time.  -EP recs: once euvolemic, may try turning off BiV pacing to see if she responds better    #Pulmonary  Cough likely 2/2 acute CHF  -Keep head of bed above 30degrees  -O2 support PRN to maintain goal SpO2 > 90    #Renal  MAGNUS on chronic CKD 2/2 prerenal azotemia 2/2 ADHF   -Dobutamine as above to increase EABP seen by kidney  -Strict I/Os  -Renally dose meds to GFR 20-30    Hyponatremia  -Improving at an appropriate rate  - likely to help  -will monitor Q12 at least    Hyperkalemia   -improved  -Hold entresto and KCl tabs    Hypocalcemia  -s/p 1g IV Ca  -c/w CaCO3 & calcitriol supps    Hyperphosphatemia  -increase phoslo to 2001mg tid w/t meals    #Infectious disease  No active issues    #Hematology/Oncology  Elevated INR 2/2 congestive hepatopathy 2/2 CHF  -CHF Tx as above    Mild acute blood loss anemia 2/2 vaginal bleed  -Pelvic US showing thickened endometrial stripe  GYN recs: endometrial bx once INR improves    #Gastrointestinal  Congestive hepatopathy 2/2 CHF  -CHF Tx as above    #Endocrine  T2DM  -Hba1c  -FSG Q6H  -SSI    #Psychiatric  No active issues    #FEN  Carb consistent DASH diet    #PPx  SCDs 2/2 elevated INR    Emmett Tesfaye MD  Internal Medicine, PGY-1  Pager [phone] Assessment:  64yo post-menopausal F w/t a pmh of NICM, thyroid ca s/p thyroidectomy c/b hypoparathyroidism, HTN, pre-DM who presented w/t vaginal bleeding x1d and was found to be in acute on chronic decompensated HFrEF (9%) w/t a course further c/b UTI      Plan    #Neuro  AAOx4    #Cardiovascular  Cardiogenic Shock 2/2 acute on chronic decompensated HFrEF 9% c/b severe TR  -CVPs were elevated to 20 overnight, but improved to 16 this AM after diuresis w/t bumex  -Will start 0.25 milrinone and then check CVP 2hrs after and then decrease  if milrinone tolerated  -Diuresis w/t 2mg IV bumex BID  -Strict Is/Os  -Lactate trending down  -ICD interrogation unremarkable  -Appreciate HF recs for IABP w/t possible bridge to palliative inotropic therapy    -Interventional cards recs: No indication for ischemic eval at this time.  -EP recs: once euvolemic, may try turning off BiV pacing to see if she responds better    Tricuspid regurgitation  -Structural Cards was consulted  -Will likely need repeat TTE/ANDER and LHC for full eval     #Pulmonary  Cough likely 2/2 acute CHF  -Keep head of bed above 30degrees  -O2 support PRN to maintain goal SpO2 > 90  -Robitussin    #Renal  MAGNUS on chronic CKD 2/2 prerenal azotemia 2/2 ADHF   Mild improvement in sCr today  -Dobutamine as above to increase EABP seen by kidney  -Strict I/Os  -Renally dose meds to GFR 20-30    Hyponatremia likley 2/2 SIADH from ADHF   -Improving at an appropriate rate  - likely improving  -will monitor Q12 at least    Hyperkalemia   -improved  -Hold entresto and KCl tabs    Hypoparathyroidism  -Endocrine consult    Hypocalcemia  -c/w CaCO3 & calcitriol supp    Hyperphosphatemia  -increase phoslo to 2001mg tid w/t meals    #Infectious disease  UTI  -c/w CTX pending speciation of UCx    #Hematology/Oncology  Elevated INR 2/2 congestive hepatopathy 2/2 CHF  -CHF Tx as above  -Additional dose of Vit K today    Microcytic anemia likely 2/2 vaginal bleed  -Pelvic US showing thickened endometrial stripe  -GYN recs: endometrial bx once INR improves  -May consider iron studies    #Gastrointestinal  Congestive hepatopathy 2/2 CHF, however, bili higher than expected per GI  -CHF Tx as above  -Hold statin  -May consider transjugular liver biopsy once more stable per GI    #Endocrine  Acquired Hypoparathyroidism 2/2 thyroidectomy  -Endocrine consult    Hypocalcemia  -c/w CaCO3 & calcitriol supp    Hyperphosphatemia  -increase phoslo to 2001mg tid w/t meals      Prediabetes  -Hba1c 6.0   -DC FSG Q6H  -DC SSI    #Psychiatric  No active issues    #FEN  Carb consistent DASH diet    #PPx  SCDs 2/2 elevated INR    Emmett Tesfaye MD  Internal Medicine, PGY-1  Pager [phone]

## 2018-11-08 NOTE — PROGRESS NOTE ADULT - SUBJECTIVE AND OBJECTIVE BOX
Subjective: No chest pain; pt. complains of abdominal pain   	  acetaminophen   Tablet .. 650 milliGRAM(s) Oral every 6 hours PRN  aspirin  chewable 81 milliGRAM(s) Oral daily  calcitriol   Capsule 0.5 MICROGram(s) Oral two times a day  calcium acetate 2001 milliGRAM(s) Oral three times a day with meals  calcium carbonate 1250 mG  + Vitamin D (OsCal 500 + D) 1 Tablet(s) Oral three times a day  cefTRIAXone   IVPB 1 Gram(s) IV Intermittent every 24 hours  chlorhexidine 4% Liquid 1 Application(s) Topical <User Schedule>  DOBUTamine Infusion 2.5 MICROgram(s)/kG/Min IV Continuous <Continuous>  influenza   Vaccine 0.5 milliLiter(s) IntraMuscular once  insulin lispro (HumaLOG) corrective regimen sliding scale   SubCutaneous three times a day before meals  insulin lispro (HumaLOG) corrective regimen sliding scale   SubCutaneous at bedtime  levothyroxine 175 MICROGram(s) Oral daily  nystatin/triamcinolone Cream 1 Application(s) Topical every 12 hours  phytonadione   Solution 5 milliGRAM(s) Oral once                            10.9   7.7   )-----------( 148      ( 08 Nov 2018 04:04 )             32.5       Hemoglobin: 10.9 g/dL (11-08 @ 04:04)  Hemoglobin: 11.1 g/dL (11-07 @ 13:01)  Hemoglobin: 11.3 g/dL (11-07 @ 03:59)  Hemoglobin: 12.3 g/dL (11-06 @ 13:24)      11-08    126<L>  |  79<L>  |  67<H>  ----------------------------<  165<H>  3.8   |  28  |  1.83<H>    Ca    6.5<LL>      08 Nov 2018 04:04  Phos  6.1     11-08  Mg     1.7     11-08    TPro  6.6  /  Alb  3.1<L>  /  TBili  10.2<H>  /  DBili  x   /  AST  56<H>  /  ALT  27  /  AlkPhos  338<H>  11-08    Creatinine Trend: 1.83<--, 2.05<--, 1.98<--, 1.98<--, 2.02<--, 1.95<--    COAGS: PT/INR - ( 08 Nov 2018 04:04 )   PT: 35.8 sec;   INR: 3.03 ratio         PTT - ( 08 Nov 2018 04:04 )  PTT:29.2 sec          T(C): 36.6 (11-08-18 @ 06:00), Max: 36.6 (11-08-18 @ 06:00)  HR: 92 (11-08-18 @ 07:00) (88 - 100)  BP: 95/75 (11-08-18 @ 07:00) (73/62 - 107/79)  RR: 10 (11-08-18 @ 07:00) (8 - 23)  SpO2: 96% (11-08-18 @ 07:00) (92% - 99%)  Wt(kg): --    I&O's Summary    07 Nov 2018 07:01  -  08 Nov 2018 07:00  --------------------------------------------------------  IN: 943.3 mL / OUT: 1815 mL / NET: -871.7 mL    	  Lymphatic: No lymphadenopathy + pitting edema in LE bilaterally   Cardiovascular: Normal S1 S2,RRR, No murmurs , Peripheral pulses palpable 2+ bilaterally  Respiratory: Lungs clear to auscultation, normal effort 	  Gastrointestinal:  Soft, distended   Skin: No rashes, No ecchymoses, No cyanosis,       TELEMETRY:	    ECG: < from: 12 Lead ECG (11.06.18 @ 19:34) >  Atrial-sensed ventricular-paced rhythm  Biventricular pacemaker detected  ABNORMAL ECG    < end of copied text >    	  RADIOLOGY:   DIAGNOSTIC TESTING:  [ ] Echocardiogram: < from: Transthoracic Echocardiogram (08.23.18 @ 11:25) >  1. Tethered mitral valve leaflets with normal opening.  Moderate-severe mitral regurgitation.  2. Normal left ventricular internal dimensions and wall  thicknesses.  3. Severe global left ventricular systolic dysfunction.  4. Moderate right atrial enlargement.  A device wire is  noted in the right heart.  5. Right ventricular enlargement with decreased right  ventricular systolic function.  6. Normal tricuspid valve.  Severe tricuspid regurgitation.  7. Estimated pulmonary artery systolic pressure equals 24  mm Hg, assuming right atrial pressure equals 10  mm Hg,  consistent with normal pulmonary pressures.  *** Compared with echocardiogram of 6/29/2018, no  significant changes noted.    < end of copied text >    [ ]  Catheterization:  [ ] Stress Test:    OTHER: 	      ASSESSMENT/PLAN: 64 y/o F with PMHx of NICM s/p AICD, HTN, moderate-severe MR, DM, ? medication compliance who was admitted with volume overload and heart failure.    - cont inotropic assisted diuresis and bumex   - ABX per Medicine   - monitor coagulation , just got vit k.   - trend LFT,   - dialy weight ,  -   GI / DVT prophylaxis,  keep K>4, mag >2.0   -  GYN follow up   - heart failure input appreciated   -continue with supportive care per CCU  no need for urgent Intervention   D/W Dr Muller Subjective: No chest pain; pt. complains of abdominal pain   	  acetaminophen   Tablet .. 650 milliGRAM(s) Oral every 6 hours PRN  aspirin  chewable 81 milliGRAM(s) Oral daily  calcitriol   Capsule 0.5 MICROGram(s) Oral two times a day  calcium acetate 2001 milliGRAM(s) Oral three times a day with meals  calcium carbonate 1250 mG  + Vitamin D (OsCal 500 + D) 1 Tablet(s) Oral three times a day  cefTRIAXone   IVPB 1 Gram(s) IV Intermittent every 24 hours  chlorhexidine 4% Liquid 1 Application(s) Topical <User Schedule>  DOBUTamine Infusion 2.5 MICROgram(s)/kG/Min IV Continuous <Continuous>  influenza   Vaccine 0.5 milliLiter(s) IntraMuscular once  insulin lispro (HumaLOG) corrective regimen sliding scale   SubCutaneous three times a day before meals  insulin lispro (HumaLOG) corrective regimen sliding scale   SubCutaneous at bedtime  levothyroxine 175 MICROGram(s) Oral daily  nystatin/triamcinolone Cream 1 Application(s) Topical every 12 hours  phytonadione   Solution 5 milliGRAM(s) Oral once                            10.9   7.7   )-----------( 148      ( 08 Nov 2018 04:04 )             32.5       Hemoglobin: 10.9 g/dL (11-08 @ 04:04)  Hemoglobin: 11.1 g/dL (11-07 @ 13:01)  Hemoglobin: 11.3 g/dL (11-07 @ 03:59)  Hemoglobin: 12.3 g/dL (11-06 @ 13:24)      11-08    126<L>  |  79<L>  |  67<H>  ----------------------------<  165<H>  3.8   |  28  |  1.83<H>    Ca    6.5<LL>      08 Nov 2018 04:04  Phos  6.1     11-08  Mg     1.7     11-08    TPro  6.6  /  Alb  3.1<L>  /  TBili  10.2<H>  /  DBili  x   /  AST  56<H>  /  ALT  27  /  AlkPhos  338<H>  11-08    Creatinine Trend: 1.83<--, 2.05<--, 1.98<--, 1.98<--, 2.02<--, 1.95<--    COAGS: PT/INR - ( 08 Nov 2018 04:04 )   PT: 35.8 sec;   INR: 3.03 ratio         PTT - ( 08 Nov 2018 04:04 )  PTT:29.2 sec          T(C): 36.6 (11-08-18 @ 06:00), Max: 36.6 (11-08-18 @ 06:00)  HR: 92 (11-08-18 @ 07:00) (88 - 100)  BP: 95/75 (11-08-18 @ 07:00) (73/62 - 107/79)  RR: 10 (11-08-18 @ 07:00) (8 - 23)  SpO2: 96% (11-08-18 @ 07:00) (92% - 99%)  Wt(kg): --    I&O's Summary    07 Nov 2018 07:01  -  08 Nov 2018 07:00  --------------------------------------------------------  IN: 943.3 mL / OUT: 1815 mL / NET: -871.7 mL    	  Lymphatic: No lymphadenopathy + pitting edema in LE bilaterally   Cardiovascular: Normal S1 S2,RRR, No murmurs , Peripheral pulses palpable 2+ bilaterally  Respiratory: Lungs clear to auscultation, normal effort 	  Gastrointestinal:  Soft, distended   Skin: No rashes, No ecchymoses, No cyanosis,       TELEMETRY:	    ECG: < from: 12 Lead ECG (11.06.18 @ 19:34) >  Atrial-sensed ventricular-paced rhythm  Biventricular pacemaker detected  ABNORMAL ECG    < end of copied text >    	  RADIOLOGY:   DIAGNOSTIC TESTING:  [ ] Echocardiogram: < from: Transthoracic Echocardiogram (08.23.18 @ 11:25) >  1. Tethered mitral valve leaflets with normal opening.  Moderate-severe mitral regurgitation.  2. Normal left ventricular internal dimensions and wall  thicknesses.  3. Severe global left ventricular systolic dysfunction.  4. Moderate right atrial enlargement.  A device wire is  noted in the right heart.  5. Right ventricular enlargement with decreased right  ventricular systolic function.  6. Normal tricuspid valve.  Severe tricuspid regurgitation.  7. Estimated pulmonary artery systolic pressure equals 24  mm Hg, assuming right atrial pressure equals 10  mm Hg,  consistent with normal pulmonary pressures.  *** Compared with echocardiogram of 6/29/2018, no  significant changes noted.    < end of copied text >    [ ]  Catheterization:  [ ] Stress Test:    OTHER: 	      ASSESSMENT/PLAN: 64 y/o F with PMHx of NICM s/p AICD, HTN, moderate-severe MR, DM, ? medication compliance who was admitted with volume overload and heart failure.    - cont inotropic assisted diuresis and bumex   - ABX per Medicine   - monitor coagulation , just got vit k.   - trend LFT,   - dialy weight ,  -   GI / DVT prophylaxis,  keep K>4, mag >2.0   -  GYN follow up   - heart failure input appreciated   -continue with supportive care per CCU

## 2018-11-08 NOTE — PROGRESS NOTE ADULT - SUBJECTIVE AND OBJECTIVE BOX
OOB in chair feeling improved since yesterday  Patient with no anginal chest pain or shortness of breath  s/p RIJ placement  now on /Primacor    MEDICATIONS  (STANDING):  aspirin  chewable 81 milliGRAM(s) Oral daily  buMETAnide Injectable 2 milliGRAM(s) IV Push two times a day  calcitriol   Capsule 0.5 MICROGram(s) Oral two times a day  calcium acetate 2001 milliGRAM(s) Oral three times a day with meals  calcium carbonate 1250 mG  + Vitamin D (OsCal 500 + D) 2 Tablet(s) Oral three times a day  cefTRIAXone   IVPB 1 Gram(s) IV Intermittent every 24 hours  chlorhexidine 4% Liquid 1 Application(s) Topical <User Schedule>  DOBUTamine Infusion 2.5 MICROgram(s)/kG/Min (7.26 mL/Hr) IV Continuous <Continuous>  influenza   Vaccine 0.5 milliLiter(s) IntraMuscular once  levothyroxine 175 MICROGram(s) Oral daily  milrinone Infusion 0.25 MICROgram(s)/kG/Min (7.26 mL/Hr) IV Continuous <Continuous>  nystatin/triamcinolone Cream 1 Application(s) Topical every 12 hours    MEDICATIONS  (PRN):  acetaminophen   Tablet .. 650 milliGRAM(s) Oral every 6 hours PRN Moderate Pain (4 - 6)      LABS:                        10.9   7.7   )-----------( 148      ( 2018 04:04 )             32.5     Hemoglobin: 10.9 g/dL ( @ 04:04)  Hemoglobin: 11.1 g/dL ( @ 13:01)  Hemoglobin: 11.3 g/dL ( @ 03:59)  Hemoglobin: 12.3 g/dL ( @ 13:24)        126<L>  |  79<L>  |  67<H>  ----------------------------<  165<H>  3.8   |  28  |  1.83<H>    Ca    6.5<LL>      2018 04:04  Phos  6.1       Mg     1.7         TPro  6.6  /  Alb  3.1<L>  /  TBili  10.2<H>  /  DBili  x   /  AST  56<H>  /  ALT  27  /  AlkPhos  338<H>  -    Creatinine Trend: 1.83<--, 2.05<--, 1.98<--, 1.98<--, 2.02<--, 1.95<--   PT/INR - ( 2018 04:04 )   PT: 35.8 sec;   INR: 3.03 ratio         PTT - ( 2018 04:04 )  PTT:29.2 sec        PHYSICAL EXAM  Vital Signs Last 24 Hrs  T(C): 36.4 (2018 12:40), Max: 36.6 (2018 06:00)  T(F): 97.6 (2018 12:40), Max: 97.8 (2018 06:00)  HR: 96 (2018 15:00) (88 - 100)  BP: 113/62 (2018 15:) (73/62 - 113/62)  BP(mean): 70 (2018 15:) (67 - 89)  RR: 10 (2018 15:) (8 - 26)  SpO2: 99% (2018 15:) (92% - 99%)    	  Lymphatic: No lymphadenopathy + pitting edema in LE bilaterally   Cardiovascular: Normal S1 S2,RRR, No murmurs , Peripheral pulses palpable 2+ bilaterally  Respiratory: Lungs clear to auscultation, normal effort 	  Gastrointestinal:  Soft, distended   Skin: No rashes, No ecchymoses, No cyanosis,       TELEMETRY:	    ECG: < from: 12 Lead ECG (18 @ 19:34) >  Atrial-sensed ventricular-paced rhythm  Biventricular pacemaker detected  ABNORMAL ECG    < end of copied text >    	  RADIOLOGY:     < from: Xray Chest 1 View- PORTABLE-Routine (18 @ 08:51) >    Impression:    The heart is enlarged. Small left pleural effusion. The right lung is   clear. A pacer is in good position. A central line is seen on the right   and the tip is in superior vena cava. No pneumothorax.    < end of copied text >        DIAGNOSTIC TESTING:    < from: TTE with Doppler (w/Cont) (18 @ 05:53) >  ------------------------------------------------------------------------  Conclusions:  1. Tethered mitral valve leaflets with normal opening.  Mitral annular calcification and thickened  mitral leaflet  tips Moderate mitral regurgitation.  2. Calcified trileaflet aortic valve with normal opening.  3. Moderately dilated left atrium.  LA volume index = 43  cc/m2.  4. Eccentric left ventricular hypertrophy (dilated left  ventricle with normal relative wall thickness).  5. Severe global left ventricularsystolic dysfunction.  Endocardial visualization enhanced with intravenous  injection of Ultrasonic Enhancing Agent (Definity). No LV  thrombus.  Septal flattening consistent with right  ventricular overload.  6. Severe diastolic dysfunction with elevated filling  pressures  7. Severe right atrial enlargement.  8. Right ventricular enlargement with decreased right  ventricular systolic function.  A device wire is noted in  the right heart.  9. Dilated tricuspid annulus with malcoaptation of  tricuspid leaflets. Severe tricuspid regurgitation.    < end of copied text >        [ ] Echocardiogram: < from: Transthoracic Echocardiogram (18 @ 11:25) >  1. Tethered mitral valve leaflets with normal opening.  Moderate-severe mitral regurgitation.  2. Normal left ventricular internal dimensions and wall  thicknesses.  3. Severe global left ventricular systolic dysfunction.  4. Moderate right atrial enlargement.  A device wire is  noted in the right heart.  5. Right ventricular enlargement with decreased right  ventricular systolic function.  6. Normal tricuspid valve.  Severe tricuspid regurgitation.  7. Estimated pulmonary artery systolic pressure equals 24  mm Hg, assuming right atrial pressure equals 10  mm Hg,  consistent with normal pulmonary pressures.  *** Compared with echocardiogram of 2018, no  significant changes noted.    < end of copied text >    [ ]  Catheterization:  [ ] Stress Test:    OTHER: 	  ICD check 18   · Additional Procedure Details	call to interrogate device to check arrhythmias normal sensing and pacing thresholds , stable lead impedances AT AF burden <0.1 % ,optivol fluid index below impedence line indicating no fluid accumulation No stored data for review	  · Comments	3.8 years battery life	  · Underlying Rhythm	SR 80's, not pacemaker dependent	  · % Bi-V Paced	100%	  · Battery	Good	        ASSESSMENT/PLAN: 62 y/o F with PMHx of NICM s/p AICD, HTN, moderate-severe MR, DM, ? medication compliance who was admitted with volume overload and heart failure.      -- s/p RIJ  -now on /Primacor/Bumex IVP - diuresing well (~1 L thus far today)   -- TTE reveals severe LV dysfunction with moderate- severe MR and severe TR     - structural heart noted - re-evaulate for possible raul clip when euvolemic   -lactate/LFTS/INR downtrending   -no urgent ischemic evaluation needed at this time  -ICD interrogation with NSR    - renal appreciated   -appreciate GYN consultation - eventual endometrial bx  -heart failure recs noted -   "RHC with IABP for MCS as bridge to possibly palliative inotropic therapy"                       - at this time given patient's history of non compliance, she is a poor candidate for advanced therapies/transplant                       -f/u pending   -continue with supportive care per CCU

## 2018-11-08 NOTE — PROGRESS NOTE ADULT - ATTENDING COMMENTS
Patient is seen and examined with fellow, NP and the CCU house-staff. I agree with the history, physical and the assessment and plan.  endo eval pending  c/w ionotropic assisted diuresisi

## 2018-11-08 NOTE — CHART NOTE - NSCHARTNOTEFT_GEN_A_CORE
====================  CCU MIDNIGHT ROUNDS  ====================    DELIA SERRANO  [id number]    ====================  SUMMARY: HPI:  64yo post-menopausal F w/ pmh of CHF, thyroid ca, HTN, DM, daily ASA user,  c/o vaginal bleeding that she noticed this morning. Pt states she woke up feeling fatigued with abdominal pressure and that when she sat down, she noticed that she had bled through her underwear. Pt has not experienced any bleeding episodes like this in the past. she admits to normal urinary and bowel functions and denies any HA, dizziness, SOB, CP, N/V/D, constipation. Pt has no known uterine pathologies and has not seen a GYN in 15-20 years. Currently Pt reports fatigue and daughter states she has been in this state for the past couple weeks since she was discharged from hospital for CHF exacerbation. (06 Nov 2018 19:12)    ====================        ====================  NEW EVENTS:  ====================        ====================  VITALS (Last 12 hrs):  ====================    T(C): 36.4 (11-07-18 @ 20:15), Max: 36.4 (11-07-18 @ 13:00)  HR: 96 (11-08-18 @ 00:00) (88 - 96)  BP: 91/70 (11-08-18 @ 00:00) (73/62 - 96/71)  BP(mean): 79 (11-08-18 @ 00:00) (67 - 81)  ABP: --  ABP(mean): --  RR: 19 (11-08-18 @ 00:00) (8 - 19)  SpO2: 98% (11-08-18 @ 00:00) (94% - 99%)  Wt(kg): --  CVP(mm Hg): 19 (11-08-18 @ 00:00) (19 - 23)  CO: --  CI: --  PA: --  PA(mean): --  PA(direct): --  PCWP: --  SVR: --    TELEMETRY:        *BLOOD GAS/ARTERIAL/MIXED/VENOUS  *LACTATE    I&O's Summary    06 Nov 2018 07:01  -  07 Nov 2018 07:00  --------------------------------------------------------  IN: 200 mL / OUT: 550 mL / NET: -350 mL    07 Nov 2018 07:01  -  08 Nov 2018 00:18  --------------------------------------------------------  IN: 609.5 mL / OUT: 415 mL / NET: 194.5 mL        ====================  PLAN:  ====================    HEALTH ISSUES - PROBLEM Dx:  Elevated liver enzymes: Elevated liver enzymes  UTI (urinary tract infection): UTI (urinary tract infection)  Cardiogenic shock: Cardiogenic shock  Postmenopausal bleeding: Postmenopausal bleeding  Vaginal bleeding: Vaginal bleeding  Hypothyroidism: Hypothyroidism  HTN (hypertension): HTN (hypertension)  DM (diabetes mellitus): DM (diabetes mellitus)  Hyponatremia: Hyponatremia  Hyperkalemia: Hyperkalemia  Acute renal failure: Acute renal failure  Acute on chronic systolic congestive heart failure: Acute on chronic systolic congestive heart failure        HEALTH ISSUES - R/O PROBLEM Dx:  Jaundice, hepatocellular: R/O Jaundice, hepatocellular  Hypocalcemia: R/O Hypocalcemia      Daniela Martinez U PA #[id number] ====================  CCU MIDNIGHT ROUNDS  ====================    DELIA SERRANO  [id number]    ====================  SUMMARY: HPI:  64yo post-menopausal F w/ pmh of CHF, thyroid ca, HTN, DM, daily ASA user,  c/o vaginal bleeding that she noticed this morning. Pt states she woke up feeling fatigued with abdominal pressure and that when she sat down, she noticed that she had bled through her underwear. Pt has not experienced any bleeding episodes like this in the past. she admits to normal urinary and bowel functions and denies any HA, dizziness, SOB, CP, N/V/D, constipation. Pt has no known uterine pathologies and has not seen a GYN in 15-20 years. Currently Pt reports fatigue and daughter states she has been in this state for the past couple weeks since she was discharged from hospital for CHF exacerbation. (06 Nov 2018 19:12)    ====================        ====================  NEW EVENTS:   Pts CVP >20 from cordis. Was given 80mg IVP lasix during the day with minimal response in U/O. Will give IVP of bumex and monitor closely. Volume overloaded on exam.   ====================        ====================  VITALS (Last 12 hrs):  ====================    T(C): 36.4 (11-07-18 @ 20:15), Max: 36.4 (11-07-18 @ 13:00)  HR: 96 (11-08-18 @ 00:00) (88 - 96)  BP: 91/70 (11-08-18 @ 00:00) (73/62 - 96/71)  BP(mean): 79 (11-08-18 @ 00:00) (67 - 81)  RR: 19 (11-08-18 @ 00:00) (8 - 19)  SpO2: 98% (11-08-18 @ 00:00) (94% - 99%)  CVP(mm Hg): 19 (11-08-18 @ 00:00) (19 - 23)      TELEMETRY: paced        I&O's Summary    06 Nov 2018 07:01  -  07 Nov 2018 07:00  --------------------------------------------------------  IN: 200 mL / OUT: 550 mL / NET: -350 mL    07 Nov 2018 07:01  -  08 Nov 2018 00:18  --------------------------------------------------------  IN: 609.5 mL / OUT: 415 mL / NET: 194.5 mL        ====================  PLAN:  ====================    HEALTH ISSUES - PROBLEM Dx:  Elevated liver enzymes: Elevated liver enzymes  UTI (urinary tract infection): UTI (urinary tract infection)  Cardiogenic shock: Cardiogenic shock  Postmenopausal bleeding: Postmenopausal bleeding  Vaginal bleeding: Vaginal bleeding  Hypothyroidism: Hypothyroidism  HTN (hypertension): HTN (hypertension)  DM (diabetes mellitus): DM (diabetes mellitus)  Hyponatremia: Hyponatremia  Hyperkalemia: Hyperkalemia  Acute renal failure: Acute renal failure  Acute on chronic systolic congestive heart failure: Acute on chronic systolic congestive heart failure        HEALTH ISSUES - R/O PROBLEM Dx:  Jaundice, hepatocellular: R/O Jaundice, hepatocellular  Hypocalcemia: R/O Hypocalcemia      Daniela Martinez, U PA #[id number] ====================  CCU MIDNIGHT ROUNDS  ====================    DELIA SERRANO  [id number]    ====================  SUMMARY: HPI:  62yo post-menopausal F w/ pmh of CHF, thyroid ca, HTN, DM, daily ASA user,  c/o vaginal bleeding that she noticed this morning. Pt states she woke up feeling fatigued with abdominal pressure and that when she sat down, she noticed that she had bled through her underwear. Pt has not experienced any bleeding episodes like this in the past. she admits to normal urinary and bowel functions and denies any HA, dizziness, SOB, CP, N/V/D, constipation. Pt has no known uterine pathologies and has not seen a GYN in 15-20 years. Currently Pt reports fatigue and daughter states she has been in this state for the past couple weeks since she was discharged from hospital for CHF exacerbation. (06 Nov 2018 19:12)    ====================        ====================  NEW EVENTS:   Pts CVP >20 from cordis. Was given 80mg IVP lasix during the day with minimal response in U/O. Will give IVP of bumex and monitor closely. Volume overloaded on exam.   ====================        ====================  VITALS (Last 12 hrs):  ====================    T(C): 36.4 (11-07-18 @ 20:15), Max: 36.4 (11-07-18 @ 13:00)  HR: 96 (11-08-18 @ 00:00) (88 - 96)  BP: 91/70 (11-08-18 @ 00:00) (73/62 - 96/71)  BP(mean): 79 (11-08-18 @ 00:00) (67 - 81)  RR: 19 (11-08-18 @ 00:00) (8 - 19)  SpO2: 98% (11-08-18 @ 00:00) (94% - 99%)  CVP(mm Hg): 19 (11-08-18 @ 00:00) (19 - 23)      TELEMETRY: paced        I&O's Summary    06 Nov 2018 07:01  -  07 Nov 2018 07:00  --------------------------------------------------------  IN: 200 mL / OUT: 550 mL / NET: -350 mL    07 Nov 2018 07:01  -  08 Nov 2018 00:18  --------------------------------------------------------  IN: 609.5 mL / OUT: 415 mL / NET: 194.5 mL        ====================  PLAN:  - cardiogenic shock: c/w dobutamine. Monitor CVPs, U/O, and daily weights. Given bumex IVP for diuresis with good response. If pt does not improve may need mechanical support. Will need RHC eventually.   - vaginal bleeding: GYN following. Once INR normalizes plan for biopsy.  ====================    HEALTH ISSUES - PROBLEM Dx:  Elevated liver enzymes: Elevated liver enzymes  UTI (urinary tract infection): UTI (urinary tract infection)  Cardiogenic shock: Cardiogenic shock  Postmenopausal bleeding: Postmenopausal bleeding  Vaginal bleeding: Vaginal bleeding  Hypothyroidism: Hypothyroidism  HTN (hypertension): HTN (hypertension)  DM (diabetes mellitus): DM (diabetes mellitus)  Hyponatremia: Hyponatremia  Hyperkalemia: Hyperkalemia  Acute renal failure: Acute renal failure  Acute on chronic systolic congestive heart failure: Acute on chronic systolic congestive heart failure        HEALTH ISSUES - R/O PROBLEM Dx:  Jaundice, hepatocellular: R/O Jaundice, hepatocellular  Hypocalcemia: R/O Hypocalcemia      Daniela Martinez, U PA #[id number]

## 2018-11-08 NOTE — CONSULT NOTE ADULT - ATTENDING COMMENTS
Agree with assessment and plan as above by Dr. Deutsch. Reviewed all pertinent labs, glucose values, and imaging studies. Modifications made as indicated above.     John Knox D.O  [phone]
This is a 63 yo F with chronic systolic HF, LVEF 10% (since 2013) who has had 5 admissions to [facility] and 1 recently to [place] in 2018 for acute decompensated HF. I have cared for her on some of those admissions and she is a deidre lady, but with very poor insight into her condition and she has failed to follow-up with the advanced HF team (or her own providers consistently) resulting in suboptimal GDMT for her HF. Her other PMH of note includes hypertension, hyperlipidemia, DM II, thyroid cancer s/p thyroidectomy, & hypocalcemia, s/p CRT-D (6/17). Her primary cardiologist is Dr. Robbins.    Overnight, she was started on  2.5 mcg/kg/min and bumex 2 mg IV BID. Her initial Na 116, BUN 75, SCr 1.95, INR 3.33, TBili 11.3. Milrinone 0.25 mcg/kg/min was added this morning. Her U/O was 1800 mL overnight and has improved over the course of the day to 2200 mL so far. Her Na is up to 126 and her BUN/SCr 67/1.83, TBili 10.2. Her K is now 3.8.    She remains volume overloaded with a CVP of 13-15 when I leveled her transducer. I again tried to probe her to understand her thought process regarding her heart disease. She left [facility] last week and is again on inotropes. The current plan is for PICC line placement and home inotrope, but she does not understand that this is a life limiting disease ("I don't want to talk about that"). She has not been compliant despite multiple hospitalizations and explanations of her condition. I personally met with her and her daughter during one such stay at [facility] CCU, but she cannot recall what we spoke about, but she does remember the visit.    She remains in cardiogenic shock and has a very high mortality. I believe an IABP would correct her hemodynamics, but the CCU team has elected to hold off. At a minimum, she should be on spironolactone 25 mg po daily and the  can be weaned once the milrinone has been on board for a few hours. Her BP would tolerate oral medical therapy, but in the past she has not been compliant and she is resigned to the home infusion. She is not a candidate for advanced HF therapies, such as LVAD or heart transplant, given her compliance issues.    Critical Care Time = 60 minutes

## 2018-11-08 NOTE — CONSULT NOTE ADULT - ASSESSMENT
62yo F w/ pmh of CHF, papillary thyroid ca c/b hypoparathyroidism, HTN, DM (well controlled, A1C 6.0),  c/o vaginal bleeding. Admitted to CCU for CHF exacerbation. Consult for hypocalcemia.

## 2018-11-08 NOTE — PROGRESS NOTE ADULT - ATTENDING COMMENTS
EP ATTENDING    Agree with above. She is a pleasant 64 y/o female PMH severe NICM (LVEF 10-15%) who had a [employer] Biv-ICD implanted at [place]. A recent LHC at [place] was also unremarkable. She now returns for with a severe decompensation of her NICM (JVP > 20, hepatic congestion, abdominal pain, LE edema and a near 20 lb weight gain). Her device interrogation recently demonstrates excellent RA, RV and LV lead functions, but with frequent episodes of NSVT. She denies palpitations nor high voltage therapy.     -supportive care as per CCU  -continue lasix and dobutamine drips  -CHF follow up for advanced therapies (VAD vs transplant)  -no need for AAD  -ICD interrogation on Wednesday unremarkable. Future options including turning off BiV pacing to see if she responds better. This can be addressed when she's euvolemic  -will follow  -she remains critically ill

## 2018-11-08 NOTE — CONSULT NOTE ADULT - SUBJECTIVE AND OBJECTIVE BOX
HPI:  64yo F w/ pmh of CHF, thyroid ca, HTN, DM, daily ASA user,  c/o vaginal bleeding that she noticed this morning. Pt states she woke up feeling fatigued with abdominal pressure and that when she sat down, she noticed that she had bled through her underwear. Pt has not experienced any bleeding episodes like this in the past. she admits to normal urinary and bowel functions and denies any HA, dizziness, SOB, CP, N/V/D, constipation. Pt has no known uterine pathologies and has not seen a GYN in 15-20 years. Currently Pt reports fatigue and daughter states she has been in this state for the past couple weeks since she was discharged from hospital for CHF exacerbation. (06 Nov 2018 19:12)      PAST MEDICAL & SURGICAL HISTORY:  Asthma  CHF (congestive heart failure): with EF of 10% s/p AICD  DM (diabetes mellitus)  HTN (hypertension)  Thyroid ca  AICD (automatic cardioverter/defibrillator) present  S/P thyroidectomy      FAMILY HISTORY:  No pertinent family history in first degree relatives      Social History:    Outpatient Medications:    MEDICATIONS  (STANDING):  aspirin  chewable 81 milliGRAM(s) Oral daily  buMETAnide Injectable 2 milliGRAM(s) IV Push two times a day  calcitriol   Capsule 0.5 MICROGram(s) Oral two times a day  calcium acetate 2001 milliGRAM(s) Oral three times a day with meals  calcium carbonate 1250 mG  + Vitamin D (OsCal 500 + D) 2 Tablet(s) Oral three times a day  cefTRIAXone   IVPB 1 Gram(s) IV Intermittent every 24 hours  chlorhexidine 4% Liquid 1 Application(s) Topical <User Schedule>  DOBUTamine Infusion 2.5 MICROgram(s)/kG/Min (7.26 mL/Hr) IV Continuous <Continuous>  influenza   Vaccine 0.5 milliLiter(s) IntraMuscular once  levothyroxine 175 MICROGram(s) Oral daily  milrinone Infusion 0.25 MICROgram(s)/kG/Min (7.26 mL/Hr) IV Continuous <Continuous>  nystatin/triamcinolone Cream 1 Application(s) Topical every 12 hours    MEDICATIONS  (PRN):  acetaminophen   Tablet .. 650 milliGRAM(s) Oral every 6 hours PRN Moderate Pain (4 - 6)      Allergies    Isordil (Headache)  penicillin (Rash)    Intolerances      Review of Systems:  Constitutional: No fever  Eyes: No blurry vision  Neuro: No tremors  HEENT: No pain  Cardiovascular: No chest pain, palpitations  Respiratory: No SOB, no cough  GI: No nausea, vomiting, abdominal pain  : No dysuria  Skin: no rash  Psych: no depression  Endocrine: no polyuria, polydipsia  Hem/lymph: no swelling  Osteoporosis: no fractures    ALL OTHER SYSTEMS REVIEWED AND NEGATIVE    UNABLE TO OBTAIN    PHYSICAL EXAM:  VITALS: T(C): 36.4 (11-08-18 @ 12:40)  T(F): 97.6 (11-08-18 @ 12:40), Max: 97.8 (11-08-18 @ 06:00)  HR: 94 (11-08-18 @ 14:00) (88 - 100)  BP: 107/66 (11-08-18 @ 14:00) (73/62 - 111/74)  RR:  (8 - 26)  SpO2:  (92% - 99%)  Wt(kg): --  GENERAL: NAD, well-groomed, well-developed  EYES: No proptosis, no lid lag, anicteric  HEENT:  Atraumatic, Normocephalic, moist mucous membranes  THYROID: Normal size, no palpable nodules  RESPIRATORY: Clear to auscultation bilaterally; No rales, rhonchi, wheezing  CARDIOVASCULAR: Regular rate and rhythm; No murmurs; no peripheral edema  GI: Soft, nontender, non distended, normal bowel sounds  SKIN: Dry, intact, No rashes or lesions  MUSCULOSKELETAL: Full range of motion, normal strength  NEURO: sensation intact, extraocular movements intact, no tremor  PSYCH: Alert and oriented x 3, normal affect, normal mood  CUSHING'S SIGNS: no striae    POCT Blood Glucose.: 127 mg/dL (11-08-18 @ 12:14)  POCT Blood Glucose.: 157 mg/dL (11-08-18 @ 08:12)  POCT Blood Glucose.: 189 mg/dL (11-07-18 @ 22:40)  POCT Blood Glucose.: 169 mg/dL (11-07-18 @ 17:14)  POCT Blood Glucose.: 163 mg/dL (11-07-18 @ 13:02)  POCT Blood Glucose.: 142 mg/dL (11-07-18 @ 08:14)                            10.9   7.7   )-----------( 148      ( 08 Nov 2018 04:04 )             32.5       11-08    126<L>  |  79<L>  |  67<H>  ----------------------------<  165<H>  3.8   |  28  |  1.83<H>    EGFR if : 34<L>  EGFR if non : 29<L>    Ca    6.5<LL>      11-08  Mg     1.7     11-08  Phos  6.1     11-08    TPro  6.6  /  Alb  3.1<L>  /  TBili  10.2<H>  /  DBili  x   /  AST  56<H>  /  ALT  27  /  AlkPhos  338<H>  11-08      Thyroid Function Tests:      Hemoglobin A1C, Whole Blood: 6.0 % <H> [4.0 - 5.6] (11-08-18 @ 08:09)  Hemoglobin A1C, Whole Blood: 6.4 % <H> [4.0 - 5.6] (08-21-18 @ 07:47)      11-08 Chol 79 LDL 59 HDL 6<L> Trig 69      Radiology: HPI:  62yo F w/ pmh of CHF, thyroid ca, HTN, DM, daily ASA user,  c/o vaginal bleeding that she noticed this morning. Pt states she woke up feeling fatigued with abdominal pressure and that when she sat down, she noticed that she had bled through her underwear. Pt has not experienced any bleeding episodes like this in the past. she admits to normal urinary and bowel functions and denies any HA, dizziness, SOB, CP, N/V/D, constipation. Pt has no known uterine pathologies and has not seen a GYN in 15-20 years. Currently Pt reports fatigue and daughter states she has been in this state for the past couple weeks since she was discharged from hospital for CHF exacerbation. Patient was admitted to CCU for CHF exacerbation.    Endocrine History:  Patient has a hx       PAST MEDICAL & SURGICAL HISTORY:  Asthma  CHF (congestive heart failure): with EF of 10% s/p AICD  DM (diabetes mellitus)  HTN (hypertension)  Thyroid ca  AICD (automatic cardioverter/defibrillator) present  S/P thyroidectomy      FAMILY HISTORY:  No pertinent family history in first degree relatives      Social History:    Outpatient Medications:    MEDICATIONS  (STANDING):  aspirin  chewable 81 milliGRAM(s) Oral daily  buMETAnide Injectable 2 milliGRAM(s) IV Push two times a day  calcitriol   Capsule 0.5 MICROGram(s) Oral two times a day  calcium acetate 2001 milliGRAM(s) Oral three times a day with meals  calcium carbonate 1250 mG  + Vitamin D (OsCal 500 + D) 2 Tablet(s) Oral three times a day  cefTRIAXone   IVPB 1 Gram(s) IV Intermittent every 24 hours  chlorhexidine 4% Liquid 1 Application(s) Topical <User Schedule>  DOBUTamine Infusion 2.5 MICROgram(s)/kG/Min (7.26 mL/Hr) IV Continuous <Continuous>  influenza   Vaccine 0.5 milliLiter(s) IntraMuscular once  levothyroxine 175 MICROGram(s) Oral daily  milrinone Infusion 0.25 MICROgram(s)/kG/Min (7.26 mL/Hr) IV Continuous <Continuous>  nystatin/triamcinolone Cream 1 Application(s) Topical every 12 hours    MEDICATIONS  (PRN):  acetaminophen   Tablet .. 650 milliGRAM(s) Oral every 6 hours PRN Moderate Pain (4 - 6)      Allergies    Isordil (Headache)  penicillin (Rash)    Intolerances      Review of Systems:  Constitutional: No fever  Eyes: No blurry vision  Neuro: No tremors  HEENT: No pain  Cardiovascular: No chest pain, palpitations  Respiratory: No SOB, no cough  GI: No nausea, vomiting, abdominal pain  : No dysuria  Skin: no rash  Psych: no depression  Endocrine: no polyuria, polydipsia  Hem/lymph: no swelling  Osteoporosis: no fractures    ALL OTHER SYSTEMS REVIEWED AND NEGATIVE    UNABLE TO OBTAIN    PHYSICAL EXAM:  VITALS: T(C): 36.4 (11-08-18 @ 12:40)  T(F): 97.6 (11-08-18 @ 12:40), Max: 97.8 (11-08-18 @ 06:00)  HR: 94 (11-08-18 @ 14:00) (88 - 100)  BP: 107/66 (11-08-18 @ 14:00) (73/62 - 111/74)  RR:  (8 - 26)  SpO2:  (92% - 99%)  Wt(kg): --  GENERAL: NAD, well-groomed, well-developed  EYES: No proptosis, no lid lag, anicteric  HEENT:  Atraumatic, Normocephalic, moist mucous membranes  THYROID: Normal size, no palpable nodules  RESPIRATORY: Clear to auscultation bilaterally; No rales, rhonchi, wheezing  CARDIOVASCULAR: Regular rate and rhythm; No murmurs; no peripheral edema  GI: Soft, nontender, non distended, normal bowel sounds  SKIN: Dry, intact, No rashes or lesions  MUSCULOSKELETAL: Full range of motion, normal strength  NEURO: sensation intact, extraocular movements intact, no tremor  PSYCH: Alert and oriented x 3, normal affect, normal mood  CUSHING'S SIGNS: no striae    POCT Blood Glucose.: 127 mg/dL (11-08-18 @ 12:14)  POCT Blood Glucose.: 157 mg/dL (11-08-18 @ 08:12)  POCT Blood Glucose.: 189 mg/dL (11-07-18 @ 22:40)  POCT Blood Glucose.: 169 mg/dL (11-07-18 @ 17:14)  POCT Blood Glucose.: 163 mg/dL (11-07-18 @ 13:02)  POCT Blood Glucose.: 142 mg/dL (11-07-18 @ 08:14)                            10.9   7.7   )-----------( 148      ( 08 Nov 2018 04:04 )             32.5       11-08    126<L>  |  79<L>  |  67<H>  ----------------------------<  165<H>  3.8   |  28  |  1.83<H>    EGFR if : 34<L>  EGFR if non : 29<L>    Ca    6.5<LL>      11-08  Mg     1.7     11-08  Phos  6.1     11-08    TPro  6.6  /  Alb  3.1<L>  /  TBili  10.2<H>  /  DBili  x   /  AST  56<H>  /  ALT  27  /  AlkPhos  338<H>  11-08      Thyroid Function Tests:      Hemoglobin A1C, Whole Blood: 6.0 % <H> [4.0 - 5.6] (11-08-18 @ 08:09)  Hemoglobin A1C, Whole Blood: 6.4 % <H> [4.0 - 5.6] (08-21-18 @ 07:47)      11-08 Chol 79 LDL 59 HDL 6<L> Trig 69      Radiology: HPI:  62yo F w/ pmh of CHF, papillary thyroid ca c/b hypoparathyroidism, HTN, DM (well controlled, A1C 6.0),  c/o vaginal bleeding that she noticed this morning. Pt states she woke up feeling fatigued with abdominal pressure and that when she sat down, she noticed that she had bled through her underwear. Pt has not experienced any bleeding episodes like this in the past. she admits to normal urinary and bowel functions and denies any HA, dizziness, SOB, CP, N/V/D, constipation. Pt has no known uterine pathologies and has not seen a GYN in 15-20 years. Currently Pt reports fatigue and daughter states she has been in this state for the past couple weeks since she was discharged from hospital for CHF exacerbation. Patient was admitted to CCU for CHF exacerbation.    Endocrine History:  Patient has a hx of papillary thyroid cancer s/p total thyroidectomy in 2000, as well as ZULETA, c/b persistent hypocalcemia 2/2 hypoparathyroidism. Follows with endocrinologist in [place]. Patient reports being on calcitriol 0.5mcg BID as well as taking 2 tums BID. When she has symptoms of paresthesias she takes an extra tums. Reports has symptoms approx 1X/month. Denies any current symptoms. Also on Vitamin D3 1000 units daily. Currently also on levothyroxine 175mcg. Denies any recent fatigue, D/C, palpitations, heat/cold intolerance. Follows with endo and gets regular thyroid US, and reports have been WNL.    Patient also with a hx of Type 2 DM (well controlled A1C 6.0). Reports a hx of Dm for past 3-4 years. Currently on tradjenta 5mg daily. has glucometer and checks FS in [place] a couple times a week. Range 110-140s. Reports trying to follow low carb diet. No soda, or juice.       PAST MEDICAL & SURGICAL HISTORY:  Asthma  CHF (congestive heart failure): with EF of 10% s/p AICD  DM (diabetes mellitus)  HTN (hypertension)  Thyroid ca  AICD (automatic cardioverter/defibrillator) present  S/P thyroidectomy      FAMILY HISTORY:  No pertinent family history in first degree relatives      Social History: No smoking, alcohol use    Outpatient Medications:  · 	Robaxin-750 oral tablet: 1 tab(s) orally 3 times a day   · 	 mg oral tablet: 1 tab(s) orally every 8 hours as needed for pain  · 	pantoprazole 40 mg oral delayed release tablet: 1 tab(s) orally once a day (before a meal)  · 	hydroCHLOROthiazide 50 mg oral tablet: 1 tab(s) orally 2 times a day  · 	Calphron 667 mg oral tablet: 1 tab(s) orally 3 times a day  · 	cholecalciferol oral tablet: 1 tab(s) orally once a day , 1000 units  · 	calcitriol 0.5 mcg oral capsule: 1 cap(s) orally once a day (at bedtime)  · 	calcium-vitamin D 500 mg-200 intl units oral tablet: 1 tab(s) orally 3 times a day  · 	K-Tab 20 mEq oral tablet, extended release: 1 tab(s) orally once a day   · 	famotidine 20 mg oral tablet: 1 tab(s) orally 2 times a day  · 	torsemide 20 mg oral tablet: 2 tab(s) orally 2 times a day   · 	metoprolol succinate 25 mg oral tablet, extended release: 1 tab(s) orally once a day  · 	aspirin 81 mg oral delayed release tablet: 1 tab(s) orally once a day  · 	sacubitril-valsartan 24 mg-26 mg oral tablet: 1 tab(s) orally 2 times a day  · 	Aldactone 25 mg oral tablet: 1 tab(s) orally once a day   · 	levothyroxine 175 mcg (0.175 mg) oral tablet: 1 tab(s) orally once a day  · 	magnesium oxide 400 mg (241.3 mg elemental magnesium) oral tablet: 1 tab(s) orally 3 times a day (with meals)      MEDICATIONS  (STANDING):  aspirin  chewable 81 milliGRAM(s) Oral daily  buMETAnide Injectable 2 milliGRAM(s) IV Push two times a day  calcitriol   Capsule 0.5 MICROGram(s) Oral two times a day  calcium acetate 2001 milliGRAM(s) Oral three times a day with meals  calcium carbonate 1250 mG  + Vitamin D (OsCal 500 + D) 2 Tablet(s) Oral three times a day  cefTRIAXone   IVPB 1 Gram(s) IV Intermittent every 24 hours  chlorhexidine 4% Liquid 1 Application(s) Topical <User Schedule>  DOBUTamine Infusion 2.5 MICROgram(s)/kG/Min (7.26 mL/Hr) IV Continuous <Continuous>  influenza   Vaccine 0.5 milliLiter(s) IntraMuscular once  levothyroxine 175 MICROGram(s) Oral daily  milrinone Infusion 0.25 MICROgram(s)/kG/Min (7.26 mL/Hr) IV Continuous <Continuous>  nystatin/triamcinolone Cream 1 Application(s) Topical every 12 hours    MEDICATIONS  (PRN):  acetaminophen   Tablet .. 650 milliGRAM(s) Oral every 6 hours PRN Moderate Pain (4 - 6)      Allergies    Isordil (Headache)  penicillin (Rash)    Intolerances      Review of Systems:  Constitutional: No fever  Eyes: No blurry vision  Neuro: No tremors  HEENT: No pain  Cardiovascular: No chest pain, palpitations  Respiratory: Has SOB, no cough  GI: No nausea, vomiting, abdominal pain  Skin: no rash    Endocrine: no polyuria, polydipsia  Hem/lymph: no swelling    ALL OTHER SYSTEMS REVIEWED AND NEGATIVE      PHYSICAL EXAM:  VITALS: T(C): 36.4 (11-08-18 @ 12:40)  T(F): 97.6 (11-08-18 @ 12:40), Max: 97.8 (11-08-18 @ 06:00)  HR: 94 (11-08-18 @ 14:00) (88 - 100)  BP: 107/66 (11-08-18 @ 14:00) (73/62 - 111/74)  RR:  (8 - 26)  SpO2:  (92% - 99%)  Wt(kg): --  GENERAL: NAD, well-groomed, well-developed  EYES: No proptosis, no lid lag, anicteric  HEENT:  Atraumatic, Normocephalic, moist mucous membranes. right sided central line  THYROID:  no palpable nodules, healed surgical incision  RESPIRATORY: Clear to auscultation bilaterally; No rales, rhonchi, wheezing  CARDIOVASCULAR: Regular rate and rhythm; No murmurs; 1+ LE peripheral edema  GI: Soft, nontender, non distended, normal bowel sounds  SKIN: Dry, intact, No rashes or lesions  MUSCULOSKELETAL: Full range of motion, normal strength  PSYCH: Alert and oriented x 3, normal affect, normal mood    POCT Blood Glucose.: 127 mg/dL (11-08-18 @ 12:14)  POCT Blood Glucose.: 157 mg/dL (11-08-18 @ 08:12)  POCT Blood Glucose.: 189 mg/dL (11-07-18 @ 22:40)  POCT Blood Glucose.: 169 mg/dL (11-07-18 @ 17:14)  POCT Blood Glucose.: 163 mg/dL (11-07-18 @ 13:02)  POCT Blood Glucose.: 142 mg/dL (11-07-18 @ 08:14)                            10.9   7.7   )-----------( 148      ( 08 Nov 2018 04:04 )             32.5       11-08    126<L>  |  79<L>  |  67<H>  ----------------------------<  165<H>  3.8   |  28  |  1.83<H>    EGFR if : 34<L>  EGFR if non : 29<L>    Ca    6.5<LL>      11-08  Mg     1.7     11-08  Phos  6.1     11-08    TPro  6.6  /  Alb  3.1<L>  /  TBili  10.2<H>  /  DBili  x   /  AST  56<H>  /  ALT  27  /  AlkPhos  338<H>  11-08        Hemoglobin A1C, Whole Blood: 6.0 % <H> [4.0 - 5.6] (11-08-18 @ 08:09)  Hemoglobin A1C, Whole Blood: 6.4 % <H> [4.0 - 5.6] (08-21-18 @ 07:47)      11-08 Chol 79 LDL 59 HDL 6<L> Trig 69

## 2018-11-08 NOTE — PROGRESS NOTE ADULT - PROBLEM SELECTOR PLAN 1
suspect congestive hepatopathy but uncertain  prior imaging not impressive  serologic workup negative  complete workup would include transjugular liver biopsy with pressure measurement

## 2018-11-08 NOTE — PROGRESS NOTE ADULT - SUBJECTIVE AND OBJECTIVE BOX
INTERVAL HISTORY:    REVIEW OF SYSTEMS: As above; all others negative    MEDICATIONS  (STANDING):  aspirin  chewable 81 milliGRAM(s) Oral daily  buMETAnide Injectable 2 milliGRAM(s) IV Push two times a day  calcitriol   Capsule 0.5 MICROGram(s) Oral two times a day  calcium acetate 2001 milliGRAM(s) Oral three times a day with meals  calcium carbonate 1250 mG  + Vitamin D (OsCal 500 + D) 1 Tablet(s) Oral three times a day  cefTRIAXone   IVPB 1 Gram(s) IV Intermittent every 24 hours  chlorhexidine 4% Liquid 1 Application(s) Topical <User Schedule>  DOBUTamine Infusion 2.5 MICROgram(s)/kG/Min (7.26 mL/Hr) IV Continuous <Continuous>  influenza   Vaccine 0.5 milliLiter(s) IntraMuscular once  insulin lispro (HumaLOG) corrective regimen sliding scale   SubCutaneous three times a day before meals  insulin lispro (HumaLOG) corrective regimen sliding scale   SubCutaneous at bedtime  levothyroxine 175 MICROGram(s) Oral daily  magnesium oxide 400 milliGRAM(s) Oral once  nystatin/triamcinolone Cream 1 Application(s) Topical every 12 hours  phytonadione   Solution 5 milliGRAM(s) Oral once    MEDICATIONS  (PRN):  acetaminophen   Tablet .. 650 milliGRAM(s) Oral every 6 hours PRN Moderate Pain (4 - 6)      Objective:  ICU Vital Signs Last 24 Hrs  T(C): 36.6 (2018 06:00), Max: 36.6 (2018 06:00)  T(F): 97.8 (2018 06:00), Max: 97.8 (2018 06:00)  HR: 92 (2018 07:00) (88 - 100)  BP: 95/75 (2018 07:00) (73/62 - 107/79)  BP(mean): 86 (2018 07:00) (67 - 89)  ABP: --  ABP(mean): --  RR: 10 (2018 07:00) (8 - 23)  SpO2: 96% (2018 07:00) (92% - 99%)      Adult Advanced Hemodynamics Last 24 Hrs  CVP(mm Hg): 16 (2018 07:00) (12 - 23)  CVP(cm H2O): --  CO: --  CI: --  PA: --  PA(mean): --  PCWP: --  SVR: --  SVRI: --  PVR: --  PVRI: --       @ 07:01  -   @ 07:00  --------------------------------------------------------  IN: 943.3 mL / OUT: 1815 mL / NET: -871.7 mL      Daily Height in cm: 170.18 (2018 08:00)    Daily     PHYSICAL EXAM:      Constitutional:    HEENT:    Respiratory:    Cardiovascular:    Gastrointestinal:    Genitourinary:    Extremities:    Vascular:    Neurological:    Skin:      TELEMETRY:     EKG:     IMAGING:    Labs:                          10.9   7.7   )-----------( 148      ( 2018 04:04 )             32.5         126<L>  |  79<L>  |  67<H>  ----------------------------<  165<H>  3.8   |  28  |  1.83<H>    Ca    6.5<LL>      2018 04:04  Phos  6.1       Mg     1.7         TPro  6.6  /  Alb  3.1<L>  /  TBili  10.2<H>  /  DBili  x   /  AST  56<H>  /  ALT  27  /  AlkPhos  338<H>      LIVER FUNCTIONS - ( 2018 04:04 )  Alb: 3.1 g/dL / Pro: 6.6 g/dL / ALK PHOS: 338 U/L / ALT: 27 U/L / AST: 56 U/L / GGT: x           PT/INR - ( 2018 04:04 )   PT: 35.8 sec;   INR: 3.03 ratio         PTT - ( 2018 04:04 )  PTT:29.2 sec    Urinalysis Basic - ( 2018 15:45 )    Color: Dark Yellow / Appearance: Turbid / S.013 / pH: x  Gluc: x / Ketone: Negative  / Bili: Small / Urobili: 2 mg/dL   Blood: x / Protein: Trace / Nitrite: Negative   Leuk Esterase: Large / RBC: 10 /hpf /  /hpf   Sq Epi: x / Non Sq Epi: 1 /hpf / Bacteria: Many        HEALTH ISSUES - PROBLEM Dx:  Elevated liver enzymes: Elevated liver enzymes  UTI (urinary tract infection): UTI (urinary tract infection)  Cardiogenic shock: Cardiogenic shock  Postmenopausal bleeding: Postmenopausal bleeding  Vaginal bleeding: Vaginal bleeding  Hypothyroidism: Hypothyroidism  HTN (hypertension): HTN (hypertension)  DM (diabetes mellitus): DM (diabetes mellitus)  Hyponatremia: Hyponatremia  Hyperkalemia: Hyperkalemia  Acute renal failure: Acute renal failure  Acute on chronic systolic congestive heart failure: Acute on chronic systolic congestive heart failure INTERVAL HISTORY:  CVP elevated to 20 overnight, got 2mg bumex IV ON. This morning, no CP, SOB, palpitations, N/V, or diaphoresis. No abdominal pain. No fevers, sweats, or chills. Still having burning on urination.     REVIEW OF SYSTEMS: As above; all others negative    MEDICATIONS  (STANDING):  aspirin  chewable 81 milliGRAM(s) Oral daily  buMETAnide Injectable 2 milliGRAM(s) IV Push two times a day  calcitriol   Capsule 0.5 MICROGram(s) Oral two times a day  calcium acetate 2001 milliGRAM(s) Oral three times a day with meals  calcium carbonate 1250 mG  + Vitamin D (OsCal 500 + D) 1 Tablet(s) Oral three times a day  cefTRIAXone   IVPB 1 Gram(s) IV Intermittent every 24 hours  chlorhexidine 4% Liquid 1 Application(s) Topical <User Schedule>  DOBUTamine Infusion 2.5 MICROgram(s)/kG/Min (7.26 mL/Hr) IV Continuous <Continuous>  influenza   Vaccine 0.5 milliLiter(s) IntraMuscular once  insulin lispro (HumaLOG) corrective regimen sliding scale   SubCutaneous three times a day before meals  insulin lispro (HumaLOG) corrective regimen sliding scale   SubCutaneous at bedtime  levothyroxine 175 MICROGram(s) Oral daily  magnesium oxide 400 milliGRAM(s) Oral once  nystatin/triamcinolone Cream 1 Application(s) Topical every 12 hours  phytonadione   Solution 5 milliGRAM(s) Oral once    MEDICATIONS  (PRN):  acetaminophen   Tablet .. 650 milliGRAM(s) Oral every 6 hours PRN Moderate Pain (4 - 6)      Objective:  ICU Vital Signs Last 24 Hrs  T(C): 36.6 (2018 06:00), Max: 36.6 (2018 06:00)  T(F): 97.8 (2018 06:00), Max: 97.8 (2018 06:00)  HR: 92 (2018 07:00) (88 - 100)  BP: 95/75 (2018 07:00) (73/62 - 107/79)  BP(mean): 86 (2018 07:00) (67 - 89)  ABP: --  ABP(mean): --  RR: 10 (2018 07:00) (8 - 23)  SpO2: 96% (2018 07:00) (92% - 99%)      Adult Advanced Hemodynamics Last 24 Hrs  CVP(mm Hg): 16 (2018 07:00) (12 - 23)  CVP(cm H2O): --  CO: --  CI: --  PA: --  PA(mean): --  PCWP: --  SVR: --  SVRI: --  PVR: --  PVRI: --       @ 07:01  -   @ 07:00  --------------------------------------------------------  IN: 943.3 mL / OUT: 1815 mL / NET: -871.7 mL      Daily Height in cm: 170.18 (2018 08:00)    Daily     PHYSICAL EXAM:  GENERAL: NAD, well-developed  HEAD:  Atraumatic, Normocephalic  EYES: EOMI, PERRLA, conjunctiva and sclera clear  NECK: JVD to the ear  CHEST/LUNG: Crackles w/t expiratory wheeze to the mid lung field bl.   HEART: NL s1s2, regular rate, v-paced, No murmurs, rubs, or gallops  ABDOMEN: Soft, Nontender, Nondistended; Bowel sounds present  EXTREMITIES:  BL upper and lower extremities warm and well perfused. 2+ Peripheral Pulses, No clubbing, cyanosis, or edema  PSYCH: AAOx3  NEUROLOGY: non-focal  SKIN: No rashes or lesions      TELEMETRY:     EKG:     IMAGING:    Labs:                          10.9   7.7   )-----------( 148      ( 2018 04:04 )             32.5     11-08    126<L>  |  79<L>  |  67<H>  ----------------------------<  165<H>  3.8   |  28  |  1.83<H>    Ca    6.5<LL>      2018 04:04  Phos  6.1     11-08  Mg     1.7     11-08    TPro  6.6  /  Alb  3.1<L>  /  TBili  10.2<H>  /  DBili  x   /  AST  56<H>  /  ALT  27  /  AlkPhos  338<H>  11-08    LIVER FUNCTIONS - ( 2018 04:04 )  Alb: 3.1 g/dL / Pro: 6.6 g/dL / ALK PHOS: 338 U/L / ALT: 27 U/L / AST: 56 U/L / GGT: x           PT/INR - ( 2018 04:04 )   PT: 35.8 sec;   INR: 3.03 ratio         PTT - ( 2018 04:04 )  PTT:29.2 sec    Urinalysis Basic - ( 2018 15:45 )    Color: Dark Yellow / Appearance: Turbid / S.013 / pH: x  Gluc: x / Ketone: Negative  / Bili: Small / Urobili: 2 mg/dL   Blood: x / Protein: Trace / Nitrite: Negative   Leuk Esterase: Large / RBC: 10 /hpf /  /hpf   Sq Epi: x / Non Sq Epi: 1 /hpf / Bacteria: Many        HEALTH ISSUES - PROBLEM Dx:  Elevated liver enzymes: Elevated liver enzymes  UTI (urinary tract infection): UTI (urinary tract infection)  Cardiogenic shock: Cardiogenic shock  Postmenopausal bleeding: Postmenopausal bleeding  Vaginal bleeding: Vaginal bleeding  Hypothyroidism: Hypothyroidism  HTN (hypertension): HTN (hypertension)  DM (diabetes mellitus): DM (diabetes mellitus)  Hyponatremia: Hyponatremia  Hyperkalemia: Hyperkalemia  Acute renal failure: Acute renal failure  Acute on chronic systolic congestive heart failure: Acute on chronic systolic congestive heart failure INTERVAL HISTORY:  CVP elevated to 20 overnight, got 2mg bumex IV ON. This morning, no CP, SOB, palpitations, N/V, or diaphoresis. No abdominal pain. No fevers, sweats, or chills. Still having burning on urination. Has a shooting pain from toes to knees bl.     REVIEW OF SYSTEMS: As above; all others negative    MEDICATIONS  (STANDING):  aspirin  chewable 81 milliGRAM(s) Oral daily  buMETAnide Injectable 2 milliGRAM(s) IV Push two times a day  calcitriol   Capsule 0.5 MICROGram(s) Oral two times a day  calcium acetate 2001 milliGRAM(s) Oral three times a day with meals  calcium carbonate 1250 mG  + Vitamin D (OsCal 500 + D) 1 Tablet(s) Oral three times a day  cefTRIAXone   IVPB 1 Gram(s) IV Intermittent every 24 hours  chlorhexidine 4% Liquid 1 Application(s) Topical <User Schedule>  DOBUTamine Infusion 2.5 MICROgram(s)/kG/Min (7.26 mL/Hr) IV Continuous <Continuous>  influenza   Vaccine 0.5 milliLiter(s) IntraMuscular once  insulin lispro (HumaLOG) corrective regimen sliding scale   SubCutaneous three times a day before meals  insulin lispro (HumaLOG) corrective regimen sliding scale   SubCutaneous at bedtime  levothyroxine 175 MICROGram(s) Oral daily  magnesium oxide 400 milliGRAM(s) Oral once  nystatin/triamcinolone Cream 1 Application(s) Topical every 12 hours  phytonadione   Solution 5 milliGRAM(s) Oral once    MEDICATIONS  (PRN):  acetaminophen   Tablet .. 650 milliGRAM(s) Oral every 6 hours PRN Moderate Pain (4 - 6)      Objective:  ICU Vital Signs Last 24 Hrs  T(C): 36.6 (2018 06:00), Max: 36.6 (2018 06:00)  T(F): 97.8 (2018 06:00), Max: 97.8 (2018 06:00)  HR: 92 (2018 07:00) (88 - 100)  BP: 95/75 (2018 07:00) (73/62 - 107/79)  BP(mean): 86 (2018 07:00) (67 - 89)  ABP: --  ABP(mean): --  RR: 10 (2018 07:00) (8 - 23)  SpO2: 96% (2018 07:00) (92% - 99%)      Adult Advanced Hemodynamics Last 24 Hrs  CVP(mm Hg): 16 (2018 07:) (12 - 23)  CVP(cm H2O): --  CO: --  CI: --  PA: --  PA(mean): --  PCWP: --  SVR: --  SVRI: --  PVR: --  PVRI: --       @ 07:01  -   @ 07:00  --------------------------------------------------------  IN: 943.3 mL / OUT: 1815 mL / NET: -871.7 mL      Daily Height in cm: 170.18 (2018 08:00)    Daily     PHYSICAL EXAM:  GENERAL: NAD, well-developed  HEAD:  Atraumatic, Normocephalic  EYES: EOMI, PERRLA, conjunctiva and sclera clear  NECK: JVD to the ear  CHEST/LUNG: Crackles w/t expiratory wheeze to the mid lung fields bl.   HEART: NL s1s2, regular rate, v-paced, No murmurs, rubs, or gallops  ABDOMEN: Soft, Nontender, Nondistended; Bowel sounds present  EXTREMITIES:  BL upper and lower extremities warm and well perfused. Radial and pedal pulses weak. 1+ pitting edema from the feet up to the pelvis.   PSYCH: AAOx3  NEUROLOGY: non-focal  SKIN: No rashes or lesions      TELEMETRY: v-paced    EKG:     IMAGING:      < from: TTE with Doppler (w/Cont) (18 @ 05:53) >  Dimensions:    Normal Values:  LA:     5.0    2.0 - 4.0 cm  Ao:     2.6    2.0 - 3.8 cm  SEPTUM: 0.8    0.6 - 1.2 cm  PWT:    0.9    0.6 - 1.1 cm  LVIDd:  6.1    3.0 - 5.6 cm  LVIDs:  5.5    1.8 - 4.0 cm  Derived variables:  LVMI: 99 g/m2  RWT: 0.29  Fractional short: 10 %  EF (Winter Rule): 9 %Doppler Peak Velocity (m/sec):  AoV=0.8  ------------------------------------------------------------------------  Observations:  Mitral Valve: Tethered mitral valve leaflets with normal  opening. Mitral annular calcification and thickened  mitral  leaflet tips Moderate mitral regurgitation.  Aortic Valve/Aorta: Calcified trileaflet aortic valve with  normal opening. Peak transaortic valve gradient equals 3 mm  Hg, mean transaortic valve gradient equals 1 mm Hg, aortic  valve velocity time integral equals 12 cm. Peak left  ventricular outflow tract gradient equals 1 mm Hg, mean  gradient is equal to 1 mm Hg, LVOT velocity time integral  equals 8 cm.  Aortic Root: 2.6 cm.  LVOT diameter: 1.8 cm.  Left Atrium: Moderately dilated left atrium.  LA volume  index = 43 cc/m2.  Left Ventricle: Severe global left ventricular systolic  dysfunction.  Endocardial visualization enhanced with  intravenous injection of Ultrasonic Enhancing Agent  (Definity). No LV thrombus.  Septal flattening consistent  with right ventricular overload. Eccentric left ventricular  hypertrophy (dilated left ventricle with normal relative  wall thickness). Severe diastolic dysfunction with elevated  filling pressures  Right Heart: Severe right atrial enlargement. Right  ventricular enlargement with decreased right ventricular  systolic function.  A device wire is noted in the right  heart. Dilated tricuspid annulus with malcoaptation of  tricuspid leaflets. Severe tricuspid regurgitation. Normal  pulmonic valve.  Pericardium/Pleura: Normal pericardium with no pericardial  effusion.  Hemodynamic: Estimated right ventricular systolic pressure  equals 19 mm Hg, assuming right atrial pressure equals 10 -  15 mm Hg, consistent with normal pulmonary  pressures.Estimated pulmonary artery systolic pressure  equals 19 mm Hg, However, this is likely underestimated in  the setting of severe TR  ------------------------------------------------------------------------  Conclusions:  1. Tethered mitral valve leaflets with normal opening.  Mitral annular calcification and thickened  mitral leaflet  tips Moderate mitral regurgitation.  2. Calcified trileaflet aortic valve with normal opening.  3. Moderately dilated left atrium.  LA volume index = 43  cc/m2.  4. Eccentric left ventricular hypertrophy (dilated left  ventricle with normal relative wall thickness).  5. Severe global left ventricularsystolic dysfunction.  Endocardial visualization enhanced with intravenous  injection of Ultrasonic Enhancing Agent (Definity). No LV  thrombus.  Septal flattening consistent with right  ventricular overload.  6. Severe diastolic dysfunction with elevated filling  pressures  7. Severe right atrial enlargement.  8. Right ventricular enlargement with decreased right  ventricular systolic function.  A device wire is noted in  the right heart.  9. Dilated tricuspid annulus with malcoaptation of  tricuspid leaflets. Severe tricuspid regurgitation.  *** No previous Echo exam.    < end of copied text >    < from: Xray Chest 1 View- PORTABLE-Routine (18 @ 08:51) >  Impression:    The heart is enlarged. Small left pleural effusion. The right lung is   clear. A pacer is in good position. A central line is seen on the right   and the tip is in superior vena cava. No pneumothorax.    < end of copied text >    Labs:                          10.9   7.7   )-----------( 148      ( 2018 04:04 )             32.5         126<L>  |  79<L>  |  67<H>  ----------------------------<  165<H>  3.8   |  28  |  1.83<H>    Ca    6.5<LL>      2018 04:04  Phos  6.1       Mg     1.7         TPro  6.6  /  Alb  3.1<L>  /  TBili  10.2<H>  /  DBili  x   /  AST  56<H>  /  ALT  27  /  AlkPhos  338<H>      LIVER FUNCTIONS - ( 2018 04:04 )  Alb: 3.1 g/dL / Pro: 6.6 g/dL / ALK PHOS: 338 U/L / ALT: 27 U/L / AST: 56 U/L / GGT: x           PT/INR - ( 2018 04:04 )   PT: 35.8 sec;   INR: 3.03 ratio         PTT - ( 2018 04:04 )  PTT:29.2 sec    Urinalysis Basic - ( 2018 15:45 )    Color: Dark Yellow / Appearance: Turbid / S.013 / pH: x  Gluc: x / Ketone: Negative  / Bili: Small / Urobili: 2 mg/dL   Blood: x / Protein: Trace / Nitrite: Negative   Leuk Esterase: Large / RBC: 10 /hpf /  /hpf   Sq Epi: x / Non Sq Epi: 1 /hpf / Bacteria: Many        HEALTH ISSUES - PROBLEM Dx:  Elevated liver enzymes: Elevated liver enzymes  UTI (urinary tract infection): UTI (urinary tract infection)  Cardiogenic shock: Cardiogenic shock  Postmenopausal bleeding: Postmenopausal bleeding  Vaginal bleeding: Vaginal bleeding  Hypothyroidism: Hypothyroidism  HTN (hypertension): HTN (hypertension)  DM (diabetes mellitus): DM (diabetes mellitus)  Hyponatremia: Hyponatremia  Hyperkalemia: Hyperkalemia  Acute renal failure: Acute renal failure  Acute on chronic systolic congestive heart failure: Acute on chronic systolic congestive heart failure

## 2018-11-08 NOTE — PROGRESS NOTE ADULT - SUBJECTIVE AND OBJECTIVE BOX
[facility]  Wale Tovar PA-C  [address] [place] [address]  [phone]      INTERVAL HPI/OVERNIGHT EVENTS:    lfts unchanged     MEDICATIONS  (STANDING):  aspirin  chewable 81 milliGRAM(s) Oral daily  buMETAnide Injectable 2 milliGRAM(s) IV Push two times a day  calcitriol   Capsule 0.5 MICROGram(s) Oral two times a day  calcium acetate 2001 milliGRAM(s) Oral three times a day with meals  calcium carbonate 1250 mG  + Vitamin D (OsCal 500 + D) 2 Tablet(s) Oral three times a day  cefTRIAXone   IVPB 1 Gram(s) IV Intermittent every 24 hours  chlorhexidine 4% Liquid 1 Application(s) Topical <User Schedule>  DOBUTamine Infusion 2.5 MICROgram(s)/kG/Min (7.26 mL/Hr) IV Continuous <Continuous>  influenza   Vaccine 0.5 milliLiter(s) IntraMuscular once  levothyroxine 175 MICROGram(s) Oral daily  milrinone Infusion 0.25 MICROgram(s)/kG/Min (7.26 mL/Hr) IV Continuous <Continuous>  nystatin/triamcinolone Cream 1 Application(s) Topical every 12 hours    MEDICATIONS  (PRN):  acetaminophen   Tablet .. 650 milliGRAM(s) Oral every 6 hours PRN Moderate Pain (4 - 6)      Allergies    Isordil (Headache)  penicillin (Rash)    Intolerances        ROS:   General:  No wt loss, fevers, chills, night sweats, fatigue,   Eyes:  Good vision, no reported pain  ENT:  No sore throat, pain, runny nose, dysphagia  CV:  No pain, palpitations, hypo/hypertension  Resp:  No dyspnea, cough, tachypnea, wheezing  GI:  No pain, No nausea, No vomiting, No diarrhea, No constipation, No weight loss, No fever, No pruritis, No rectal bleeding, No tarry stools, No dysphagia,  :  No pain, bleeding, incontinence, nocturia  Muscle:  No pain, weakness  Neuro:  No weakness, tingling, memory problems  Psych:  No fatigue, insomnia, mood problems, depression  Endocrine:  No polyuria, polydipsia, cold/heat intolerance  Heme:  No petechiae, ecchymosis, easy bruisability  Skin:  No rash, tattoos, scars, edema      PHYSICAL EXAM:   Vital Signs:  Vital Signs Last 24 Hrs  T(C): 36.4 (2018 12:40), Max: 36.6 (2018 06:00)  T(F): 97.6 (2018 12:40), Max: 97.8 (2018 06:00)  HR: 94 (2018 16:00) (90 - 100)  BP: 107/67 (2018 16:00) (73/62 - 113/62)  BP(mean): 79 (2018 16:00) (67 - 89)  RR: 13 (2018 16:00) (8 - 26)  SpO2: 92% (2018 16:00) (92% - 99%)  Daily     Daily     GENERAL:  Appears stated age, well-groomed, well-nourished, no distress  HEENT:  NC/AT,  conjunctivae clear and pink, no thyromegaly, nodules, adenopathy, no JVD, sclera -anicteric  CHEST:  Full & symmetric excursion, no increased effort, breath sounds clear  HEART:  Regular rhythm, S1, S2, no murmur/rub/S3/S4, no abdominal bruit, no edema  ABDOMEN:  Soft, non-tender, non-distended, normoactive bowel sounds,  no masses ,no hepato-splenomegaly, no signs of chronic liver disease  EXTEREMITIES:  no cyanosis,clubbing or edema  SKIN:  No rash/erythema/ecchymoses/petechiae/wounds/abscess/warm/dry  NEURO:  Alert, oriented, no asterixis, no tremor, no encephalopathy      LABS:                        10.9   7.7   )-----------( 148      ( 2018 04:04 )             32.5     11-08    126<L>  |  79<L>  |  67<H>  ----------------------------<  165<H>  3.8   |  28  |  1.83<H>    Ca    6.5<LL>      2018 04:04  Phos  6.1     11-08  Mg     1.7     11-08    TPro  6.6  /  Alb  3.1<L>  /  TBili  10.2<H>  /  DBili  x   /  AST  56<H>  /  ALT  27  /  AlkPhos  338<H>      PT/INR - ( 2018 04:04 )   PT: 35.8 sec;   INR: 3.03 ratio         PTT - ( 2018 04:04 )  PTT:29.2 sec  Urinalysis Basic - ( 2018 15:45 )    Color: Dark Yellow / Appearance: Turbid / S.013 / pH: x  Gluc: x / Ketone: Negative  / Bili: Small / Urobili: 2 mg/dL   Blood: x / Protein: Trace / Nitrite: Negative   Leuk Esterase: Large / RBC: 10 /hpf /  /hpf   Sq Epi: x / Non Sq Epi: 1 /hpf / Bacteria: Many        RADIOLOGY & ADDITIONAL TESTS:

## 2018-11-08 NOTE — CONSULT NOTE ADULT - ASSESSMENT
The Pt is a 61 y/o woman with h/o NICM (LVEF 20%, LVIDd 6.5 cm), initially identified in 2013, s/p CRT-D (6/2017), Mod-Severe MR, Severe TR, HTN, HLD, DM II who presented to the ED with c/o vaginal bleeding in the setting of an elevated INR, which was likely the result of Congestive Hepatopathy. She has multiple findings that suggest that she is in Cardiogenic Shock complicated by UTI, and has been started on empiric inotropic support. The Pt is a 61 y/o woman with h/o NICM (LVEF 20%, LVIDd 6.5 cm), initially identified in 2013, s/p CRT-D (6/2017), Mod-Severe MR, Severe TR, HTN, HLD, DM II who presented to the ED with c/o vaginal bleeding in the setting of an elevated INR, likely secondary to hepatic congestion. Course complicated by UTI, and cardiogenic shock, for which  inotropic support has been initiated. Consulted for evaluation of MR and consideration of mitraclip.

## 2018-11-08 NOTE — CONSULT NOTE ADULT - SUBJECTIVE AND OBJECTIVE BOX
HPI:  64yo post-menopausal F w/ pmh of CHF, thyroid ca, HTN, DM, daily ASA user,  c/o vaginal bleeding that she noticed this morning. Pt states she woke up feeling fatigued with abdominal pressure and that when she sat down, she noticed that she had bled through her underwear. Pt has not experienced any bleeding episodes like this in the past. she admits to normal urinary and bowel functions and denies any HA, dizziness, SOB, CP, N/V/D, constipation. Pt has no known uterine pathologies and has not seen a GYN in 15-20 years. Currently Pt reports fatigue and daughter states she has been in this state for the past couple weeks since she was discharged from hospital for CHF exacerbation. (2018 19:12)    Called by Dr. Domingo for evaluation of mitral regurgitation and consideration of mitraclip.         PAST MEDICAL & SURGICAL HISTORY:  Asthma  CHF (congestive heart failure): with EF of 10% s/p AICD  DM (diabetes mellitus)  HTN (hypertension)  Thyroid ca  AICD (automatic cardioverter/defibrillator) present  S/P thyroidectomy      REVIEW OF SYSTEMS:    CONSTITUTIONAL: No weakness, fevers or chills, activity limiting fatigue  EYES/ENT: No visual changes;  No vertigo or throat pain   NECK: No pain or stiffness  RESPIRATORY: No cough, wheezing, hemoptysis; No shortness of breath at rest  CARDIOVASCULAR: No chest pain or palpitations, PND, orthopnea, dizziness, (+) LE edema, (+) exertional dyspnea  GASTROINTESTINAL: No abdominal or epigastric pain. No nausea, vomiting, or hematemesis; No diarrhea or constipation. No melena or hematochezia.  GENITOURINARY: No dysuria, frequency or hematuria  NEUROLOGICAL: No numbness or weakness  SKIN: No itching, rashes      MEDICATIONS  (STANDING):  aspirin  chewable 81 milliGRAM(s) Oral daily  buMETAnide Injectable 2 milliGRAM(s) IV Push two times a day  calcitriol   Capsule 0.5 MICROGram(s) Oral two times a day  calcium acetate 2001 milliGRAM(s) Oral three times a day with meals  calcium carbonate 1250 mG  + Vitamin D (OsCal 500 + D) 1 Tablet(s) Oral three times a day  cefTRIAXone   IVPB 1 Gram(s) IV Intermittent every 24 hours  chlorhexidine 4% Liquid 1 Application(s) Topical <User Schedule>  DOBUTamine Infusion 2.5 MICROgram(s)/kG/Min (7.26 mL/Hr) IV Continuous <Continuous>  influenza   Vaccine 0.5 milliLiter(s) IntraMuscular once  levothyroxine 175 MICROGram(s) Oral daily  milrinone Infusion 0.25 MICROgram(s)/kG/Min (7.26 mL/Hr) IV Continuous <Continuous>  nystatin/triamcinolone Cream 1 Application(s) Topical every 12 hours    MEDICATIONS  (PRN):  acetaminophen   Tablet .. 650 milliGRAM(s) Oral every 6 hours PRN Moderate Pain (4 - 6)      Allergies    Isordil (Headache)  penicillin (Rash)    Intolerances        SOCIAL HISTORY: Lives with family, has recently required assistance with ADLs    FAMILY HISTORY:  No pertinent family history in first degree relatives      Vital Signs Last 24 Hrs  T(C): 36.4 (2018 12:40), Max: 36.6 (2018 06:00)  T(F): 97.6 (2018 12:40), Max: 97.8 (2018 06:00)  HR: 92 (2018 13:) (88 - 100)  BP: 91/63 (2018 13:00) (73/62 - 111/74)  BP(mean): 72 (2018 13:00) (67 - 89)  RR: 12 (2018 13:) (8 - 26)  SpO2: 98% (2018 13:00) (92% - 99%)    Physical Exam  General: A/ox 3, No acute Distress, initiated in inotropic support, scleral icterus   Neck: Supple, NO JVD  Cardiac: S1 S2, II/VI LLSB murmur  Pulmonary: CTAB, Breathing unlabored, No Rhonchi/Rales/Wheezing, diminished bilaterally   Abdomen: Soft, Non -tender, +BS x 4 quads  Extremities: No Rashes, (+) BLE edema  Neuro: A/o x 3, No focal deficits    LABS:                        10.9   7.7   )-----------( 148      ( 2018 04:04 )             32.5     11-08    126<L>  |  79<L>  |  67<H>  ----------------------------<  165<H>  3.8   |  28  |  1.83<H>    Ca    6.5<LL>      2018 04:04  Phos  6.1       Mg     1.7         TPro  6.6  /  Alb  3.1<L>  /  TBili  10.2<H>  /  DBili  x   /  AST  56<H>  /  ALT  27  /  AlkPhos  338<H>  11-    PT/INR - ( 2018 04:04 )   PT: 35.8 sec;   INR: 3.03 ratio         PTT - ( 2018 04:04 )  PTT:29.2 sec  Urinalysis Basic - ( 2018 15:45 )    Color: Dark Yellow / Appearance: Turbid / S.013 / pH: x  Gluc: x / Ketone: Negative  / Bili: Small / Urobili: 2 mg/dL   Blood: x / Protein: Trace / Nitrite: Negative   Leuk Esterase: Large / RBC: 10 /hpf /  /hpf   Sq Epi: x / Non Sq Epi: 1 /hpf / Bacteria: Many        RADIOLOGY & ADDITIONAL STUDIES: HPI:  62yo post-menopausal F w/ pmh of CHF, thyroid ca, HTN, DM, daily ASA user,  c/o vaginal bleeding that she noticed this morning. Pt states she woke up feeling fatigued with abdominal pressure and that when she sat down, she noticed that she had bled through her underwear. Pt has not experienced any bleeding episodes like this in the past. she admits to normal urinary and bowel functions and denies any HA, dizziness, SOB, CP, N/V/D, constipation. Pt has no known uterine pathologies and has not seen a GYN in 15-20 years. Currently Pt reports fatigue and daughter states she has been in this state for the past couple weeks since she was discharged from hospital for CHF exacerbation. (2018 19:12)    Called by Dr. Domingo for evaluation of mitral regurgitation and consideration of mitraclip.         PAST MEDICAL & SURGICAL HISTORY:  Asthma  CHF (congestive heart failure): with EF of 10% s/p AICD  DM (diabetes mellitus)  HTN (hypertension)  Thyroid ca  AICD (automatic cardioverter/defibrillator) present  S/P thyroidectomy      REVIEW OF SYSTEMS:    CONSTITUTIONAL: No weakness, fevers or chills, activity limiting fatigue  EYES/ENT: No visual changes;  No vertigo or throat pain   NECK: No pain or stiffness  RESPIRATORY: No cough, wheezing, hemoptysis; No shortness of breath at rest  CARDIOVASCULAR: No chest pain or palpitations, PND, orthopnea, dizziness, (+) LE edema, (+) exertional dyspnea  GASTROINTESTINAL: No abdominal or epigastric pain. No nausea, vomiting, or hematemesis; No diarrhea or constipation. No melena or hematochezia.  GENITOURINARY: No dysuria, frequency or hematuria  NEUROLOGICAL: No numbness or weakness  SKIN: No itching, rashes      MEDICATIONS  (STANDING):  aspirin  chewable 81 milliGRAM(s) Oral daily  buMETAnide Injectable 2 milliGRAM(s) IV Push two times a day  calcitriol   Capsule 0.5 MICROGram(s) Oral two times a day  calcium acetate 2001 milliGRAM(s) Oral three times a day with meals  calcium carbonate 1250 mG  + Vitamin D (OsCal 500 + D) 1 Tablet(s) Oral three times a day  cefTRIAXone   IVPB 1 Gram(s) IV Intermittent every 24 hours  chlorhexidine 4% Liquid 1 Application(s) Topical <User Schedule>  DOBUTamine Infusion 2.5 MICROgram(s)/kG/Min (7.26 mL/Hr) IV Continuous <Continuous>  influenza   Vaccine 0.5 milliLiter(s) IntraMuscular once  levothyroxine 175 MICROGram(s) Oral daily  milrinone Infusion 0.25 MICROgram(s)/kG/Min (7.26 mL/Hr) IV Continuous <Continuous>  nystatin/triamcinolone Cream 1 Application(s) Topical every 12 hours    MEDICATIONS  (PRN):  acetaminophen   Tablet .. 650 milliGRAM(s) Oral every 6 hours PRN Moderate Pain (4 - 6)      Allergies    Isordil (Headache)  penicillin (Rash)    Intolerances        SOCIAL HISTORY: Lives with family, has recently required assistance with ADLs    FAMILY HISTORY:  No pertinent family history in first degree relatives      Vital Signs Last 24 Hrs  T(C): 36.4 (2018 12:40), Max: 36.6 (2018 06:00)  T(F): 97.6 (2018 12:40), Max: 97.8 (2018 06:00)  HR: 92 (2018 13:) (88 - 100)  BP: 91/63 (2018 13:00) (73/62 - 111/74)  BP(mean): 72 (2018 13:00) (67 - 89)  RR: 12 (2018 13:) (8 - 26)  SpO2: 98% (2018 13:00) (92% - 99%)    Physical Exam  General: A/ox 3, No acute Distress, initiated in inotropic support, scleral icterus   Neck: Supple, NO JVD  Cardiac: S1 S2, II/VI LLSB murmur  Pulmonary: CTAB, Breathing unlabored, No Rhonchi/Rales/Wheezing, diminished bilaterally   Abdomen: Soft, Non -tender, +BS x 4 quads  Extremities: No Rashes, (+) BLE edema  Neuro: A/o x 3, No focal deficits    LABS:                        10.9   7.7   )-----------( 148      ( 2018 04:04 )             32.5     11-08    126<L>  |  79<L>  |  67<H>  ----------------------------<  165<H>  3.8   |  28  |  1.83<H>    Ca    6.5<LL>      2018 04:04  Phos  6.1     11-  Mg     1.7     -    TPro  6.6  /  Alb  3.1<L>  /  TBili  10.2<H>  /  DBili  x   /  AST  56<H>  /  ALT  27  /  AlkPhos  338<H>  11-08    PT/INR - ( 2018 04:04 )   PT: 35.8 sec;   INR: 3.03 ratio       PTT - ( 2018 04:04 )  PTT:29.2 sec  Urinalysis Basic - ( 2018 15:45 )    Color: Dark Yellow / Appearance: Turbid / S.013 / pH: x  Gluc: x / Ketone: Negative  / Bili: Small / Urobili: 2 mg/dL   Blood: x / Protein: Trace / Nitrite: Negative   Leuk Esterase: Large / RBC: 10 /hpf /  /hpf   Sq Epi: x / Non Sq Epi: 1 /hpf / Bacteria: Many        RADIOLOGY & ADDITIONAL STUDIES:    < from: TTE with Doppler (w/Cont) (18 @ 05:53) >  Patient name: DELIA SERRANO  YOB: 1955   Age: 63 (F)   MR#: [id number]  Study Date: 2018  Location: [place] [id number]: Joselin Borden RDCS  Study quality: Technically fair  Referring Physician: Tiara Rojas MD  Blood Pressure: 85/68 mmHg  Height: 170 cm  Weight: 98 kg  BSA: 2.1 m2  ------------------------------------------------------------------------  PROCEDURE: Transthoracic echocardiogram with 2-D, M-Mode  and complete spectral and color flow Doppler. Verbal  consentwas obtained for injection of  Ultrasonic Enhancing  Agent following a discussion of risks and benefits.  Following intravenous injection of Ultrasonic Enhancing  Agent , harmonic imaging was performed.  INDICATION: Heart failure, unspecified (I50.9)  ------------------------------------------------------------------------  Dimensions:    Normal Values:  LA:     5.0    2.0 - 4.0 cm  Ao:     2.6    2.0 - 3.8 cm  SEPTUM: 0.8    0.6 - 1.2 cm  PWT:    0.9    0.6 - 1.1 cm  LVIDd:  6.1    3.0 - 5.6 cm  LVIDs:  5.5    1.8 - 4.0 cm  Derived variables:  LVMI: 99 g/m2  RWT: 0.29  Fractional short: 10 %  EF (Winter Rule): 9 %Doppler Peak Velocity (m/sec):  AoV=0.8  ------------------------------------------------------------------------  Observations:  Mitral Valve: Tethered mitral valve leaflets with normal  opening. Mitral annular calcification and thickened  mitral  leaflet tips Moderate mitral regurgitation.  Aortic Valve/Aorta: Calcified trileaflet aortic valve with  normal opening. Peak transaortic valve gradient equals 3 mm  Hg, mean transaortic valve gradient equals 1 mm Hg, aortic  valve velocity time integral equals 12 cm. Peak left  ventricular outflow tract gradient equals 1 mm Hg, mean  gradient is equal to 1 mm Hg, LVOT velocity time integral  equals 8 cm.  Aortic Root: 2.6 cm.  LVOT diameter: 1.8 cm.  Left Atrium: Moderately dilated left atrium.  LA volume  index = 43 cc/m2.  Left Ventricle: Severe global left ventricular systolic  dysfunction.  Endocardial visualization enhanced with  intravenous injection of Ultrasonic Enhancing Agent  (Definity). No LV thrombus.  Septal flattening consistent  with right ventricular overload. Eccentric left ventricular  hypertrophy (dilated left ventricle with normal relative  wall thickness). Severe diastolic dysfunction with elevated  filling pressures  Right Heart: Severe right atrial enlargement. Right  ventricular enlargement with decreased right ventricular  systolic function.  A device wire is noted in the right  heart. Dilated tricuspid annulus with malcoaptation of  tricuspid leaflets. Severe tricuspid regurgitation. Normal  pulmonic valve.  Pericardium/Pleura: Normal pericardium with no pericardial  effusion.  Hemodynamic: Estimated right ventricular systolic pressure  equals 19 mm Hg, assuming right atrial pressure equals 10 -  15 mm Hg, consistent with normal pulmonary  pressures.Estimated pulmonary artery systolic pressure  equals 19 mm Hg, However, this is likely underestimated in  the setting of severe TR  ------------------------------------------------------------------------    < end of copied text >

## 2018-11-08 NOTE — PROGRESS NOTE ADULT - SUBJECTIVE AND OBJECTIVE BOX
No pain, no shortness of breath  On low-dose  gtt      VITAL:  T(C): , Max: 36.6 (18 @ 06:00)  T(F): , Max: 97.8 (18 @ 06:00)  HR: 94 (18 @ 14:00)  BP: 107/66 (18 @ 14:00)  BP(mean): 73 (18 @ 14:00)  RR: 21 (18 @ 14:00)  SpO2: 97% (18 @ 14:00)      PHYSICAL EXAM:  Constitutional: NAD, Alert  HEENT: NCAT, MMM  Neck: Supple, (+) large JVD  Respiratory: decreased BS b/l bases  Cardiovascular: RRR s1s2, no m/r/g  Gastrointestinal: BS+, soft, NT, (+)large distension  Extremities: 2-3+ b/l LE edema  Neurological: no focal deficits; strength grossly intact  Back: no CVAT b/l  Skin: No rashes, no nevi      LABS:                        10.9   7.7   )-----------( 148      ( 2018 04:04 )             32.5     Na(126)/K(3.8)/Cl(79)/HCO3(28)/BUN(67)/Cr(1.83)Glu(165)/Ca(6.5)/Mg(1.7)/PO4(6.1)     @ 04:04  Na(122)/K(4.6)/Cl(79)/HCO3(22)/BUN(74)/Cr(2.05)Glu(156)/Ca(6.0)/Mg(1.8)/PO4(6.5)     @ 13:01  Na(123)/K(4.5)/Cl(80)/HCO3(21)/BUN(76)/Cr(1.98)Glu(150)/Ca(5.9)/Mg(2.0)/PO4(6.5)     @ 03:59  Na(123)/K(4.5)/Cl(77)/HCO3(22)/BUN(72)/Cr(1.98)Glu(151)/Ca(5.9)/Mg(1.8)/PO4(6.6)     @ 21:13  Na(121)/K(6.1)/Cl(75)/HCO3(23)/BUN(74)/Cr(2.02)Glu(142)/Ca(6.2)/Mg(--)/PO4(--)     @ 15:24  Na(116)/K(6.4)/Cl(73)/HCO3(17)/BUN(75)/Cr(1.95)Glu(168)/Ca(6.1)/Mg(--)/PO4(--)     @ 13:24    Urinalysis Basic - ( 2018 15:45 )  Color: Dark Yellow / Appearance: Turbid / S.013 / pH: x  Gluc: x / Ketone: Negative  / Bili: Small / Urobili: 2 mg/dL   Blood: x / Protein: Trace / Nitrite: Negative   Leuk Esterase: Large / RBC: 10 /hpf /  /hpf   Sq Epi: x / Non Sq Epi: 1 /hpf / Bacteria: Many      ASSESSMENT: 63F w/ HTN, DM2, thyroid CA s/p resection, acquired hypoparathyroidism, and severe HFrEF, 18 a/w vaginal bleeding/MAGNUS/electrolyte derangements/ acute on chronic HFrEF    (1)Renal - MAGNUS - Prerenally mediated in setting of decompensated CHF. Slowly improving.    (2)Hyperkalemia - resolved    (3)Bone - hypocalcemia/hyperphosphatemia due to hypoparathyroidism (s/p iatrogenic parathyroidectomy). Very chronic. Calcium slowly improving    (4)Hyponatremia - due to decompensated CHF. Slowly improving.    (5)CV - slowly resolving CHF flare, on  gtt      RECOMMEND:  (1)Dobutamine infusion per Cardiology/CCU; Is<Os  (2)Oscal/Phoslo/Calcitriol as ordered  (3)1 Liter free water restriction  (4)Discontinue dietary K+ restriction  (5)BMP daily  (6)Dose new meds for GFR 20-30ml/min        Brian Henry MD  [facility]  [phone]

## 2018-11-08 NOTE — CHART NOTE - NSCHARTNOTEFT_GEN_A_CORE
====================  CCU MIDNIGHT ROUNDS  ====================    DELIA SERRANO  [id number]    ====================  SUMMARY:  ====================        ====================  NEW EVENTS:  ====================        ====================  VITALS (Last 12 hrs):  ====================    T(C): 36.4 (11-08-18 @ 12:40), Max: 36.4 (11-08-18 @ 12:40)  HR: 98 (11-08-18 @ 22:00) (92 - 100)  BP: 98/71 (11-08-18 @ 22:00) (91/63 - 114/68)  BP(mean): 86 (11-08-18 @ 22:00) (70 - 94)  ABP: --  ABP(mean): --  RR: 14 (11-08-18 @ 22:00) (10 - 21)  SpO2: 99% (11-08-18 @ 22:00) (92% - 99%)  Wt(kg): --  CVP(mm Hg): 15 (11-08-18 @ 22:00) (3 - 22)  CO: --  CI: --  PA: --  PA(mean): --  PA(direct): --  PCWP: --  SVR: --    TELEMETRY:        *BLOOD GAS/ARTERIAL/MIXED/VENOUS  *LACTATE    I&O's Summary    07 Nov 2018 07:01  -  08 Nov 2018 07:00  --------------------------------------------------------  IN: 950.6 mL / OUT: 1815 mL / NET: -864.4 mL    08 Nov 2018 07:01  -  08 Nov 2018 23:23  --------------------------------------------------------  IN: 484.1 mL / OUT: 5050 mL / NET: -4565.9 mL        ====================  PLAN:  ====================    HEALTH ISSUES - PROBLEM Dx:  Type 2 diabetes mellitus without complication, without long-term current use of insulin: Type 2 diabetes mellitus without complication, without long-term current use of insulin  Postoperative hypothyroidism: Postoperative hypothyroidism  Hypoparathyroidism: Hypoparathyroidism  Non-rheumatic mitral regurgitation: Non-rheumatic mitral regurgitation  Elevated liver enzymes: Elevated liver enzymes  UTI (urinary tract infection): UTI (urinary tract infection)  Cardiogenic shock: Cardiogenic shock  Postmenopausal bleeding: Postmenopausal bleeding  Vaginal bleeding: Vaginal bleeding  Hypothyroidism: Hypothyroidism  HTN (hypertension): HTN (hypertension)  DM (diabetes mellitus): DM (diabetes mellitus)  Hyponatremia: Hyponatremia  Hyperkalemia: Hyperkalemia  Acute renal failure: Acute renal failure  Acute on chronic systolic congestive heart failure: Acute on chronic systolic congestive heart failure        HEALTH ISSUES - R/O PROBLEM Dx:  Jaundice, hepatocellular: R/O Jaundice, hepatocellular  Hypocalcemia: R/O Hypocalcemia      Sunitha Jaffe [facility]  #[id number] ====================  CCU MIDNIGHT ROUNDS  ====================    DELIA SERRANO  [id number]    ====================  SUMMARY:  ====================    62 yo F HFrEF/NICM (20% 8/18), mod-sev MR, MDT biVICD, HTN, DM2, CKD, papillary thyroid Ca c/b hypoparathyroidism s/p thyroidectomy a/w ADHF, post-menopausal vag bleeding, supratherapeutic INR, MAGNUS, hypoNa, hypoCa, Hyperphos, req inotropes and IV diuresis      ====================  NEW EVENTS:  ====================    no events, no complaints     ====================  VITALS (Last 12 hrs):  ====================    T(C): 36.4 (11-08-18 @ 12:40), Max: 36.4 (11-08-18 @ 12:40)  HR: 98 (11-08-18 @ 22:00) (92 - 100)  BP: 98/71 (11-08-18 @ 22:00) (91/63 - 114/68)  BP(mean): 86 (11-08-18 @ 22:00) (70 - 94)  RR: 14 (11-08-18 @ 22:00) (10 - 21)  SpO2: 99% (11-08-18 @ 22:00) (92% - 99%)  CVP(mm Hg): 15 (11-08-18 @ 22:00) (3 - 22)    TELEMETRY: paced, NSVT    I&O's Summary    07 Nov 2018 07:01  -  08 Nov 2018 07:00  --------------------------------------------------------  IN: 950.6 mL / OUT: 1815 mL / NET: -864.4 mL    08 Nov 2018 07:01  -  08 Nov 2018 23:23  --------------------------------------------------------  IN: 484.1 mL / OUT: 5050 mL / NET: -4565.9 mL    ====================  PLAN:  ====================  - acute on chronic HFrEF/NICM -- c/w milrinone, given brisk rresponse to diuresis - bumex dec to 2 mg IV qd, strict I&Os ,daily weights, monitor Cr and lytes, monitor CVP, consider hydral for afterload reduction   - mod -s ev MR - TTE 11/7 w/ mod MR, consider repeating TTE when euvolemic to assess MR   - MAGNUS on CKD in setting of ADHF - avoid nephrotoxins, strict I&Os, daily weights and monitor Cr and lytes   - f/u with endo w/ regards to electrolyte replacement in Hypoparathyroidism.  - + UA - on ceftriaxone - would obtain urine culture - since patient is asymptomatic, non toxic appearing, no leukocytosis - would consider only short course of ceftriaxone     Sunitha Jaffe [facility]  #[id number]

## 2018-11-08 NOTE — CONSULT NOTE ADULT - PROBLEM SELECTOR RECOMMENDATION 9
Discussed with Dr. Marie and Dr. Calvillo  Pt. is at high risk for surgical intervention with STS of 11.1% and combined morbidity and mortality of 76%.  Given current decompensated condition, would not pursue mitraclip at this time. Would consider re-evaluation once optimized.  Discussed with patient.    [id number]

## 2018-11-08 NOTE — CONSULT NOTE ADULT - PROBLEM SELECTOR RECOMMENDATION 9
-Patient with hypoparathyroidism s/p thyroidectomy in 2000 for papillary thyroid cancer.   -Initial calcium corrected on presentation was 6.6. S/p 2G calcium gluconate IV. Current corrected calcium of 7.2.   -Patient noted in Aug 2018 with PTH of 22, inappropriately normal for hypocalcemia at that time. 25 vitamin D 59. Currently phos 6.1, Mg 1.7  -Currently on calcitriol 0.5mcg BID and calcium carbonate 1 tab TID. Would increase  calcium carbonate to 2 tabs TID to be taken with meals.  -c/w current dose of calcitriol. Would not increase, with the high phos.  -monitor calcium, phos, mg daily.  D/W primary team

## 2018-11-09 LAB
ALBUMIN SERPL ELPH-MCNC: 2.7 G/DL — LOW (ref 3.3–5)
ALBUMIN SERPL ELPH-MCNC: 2.8 G/DL — LOW (ref 3.3–5)
ALBUMIN SERPL ELPH-MCNC: 3 G/DL — LOW (ref 3.3–5)
ALP SERPL-CCNC: 320 U/L — HIGH (ref 40–120)
ALP SERPL-CCNC: 328 U/L — HIGH (ref 40–120)
ALP SERPL-CCNC: 335 U/L — HIGH (ref 40–120)
ALT FLD-CCNC: 24 U/L — SIGNIFICANT CHANGE UP (ref 10–45)
ALT FLD-CCNC: 25 U/L — SIGNIFICANT CHANGE UP (ref 10–45)
ALT FLD-CCNC: 28 U/L — SIGNIFICANT CHANGE UP (ref 10–45)
ANION GAP SERPL CALC-SCNC: 14 MMOL/L — SIGNIFICANT CHANGE UP (ref 5–17)
ANION GAP SERPL CALC-SCNC: 14 MMOL/L — SIGNIFICANT CHANGE UP (ref 5–17)
ANION GAP SERPL CALC-SCNC: 19 MMOL/L — HIGH (ref 5–17)
APTT BLD: 31.5 SEC — SIGNIFICANT CHANGE UP (ref 27.5–36.3)
AST SERPL-CCNC: 53 U/L — HIGH (ref 10–40)
AST SERPL-CCNC: 54 U/L — HIGH (ref 10–40)
AST SERPL-CCNC: 55 U/L — HIGH (ref 10–40)
BASOPHILS # BLD AUTO: 0 K/UL — SIGNIFICANT CHANGE UP (ref 0–0.2)
BASOPHILS NFR BLD AUTO: 0.1 % — SIGNIFICANT CHANGE UP (ref 0–2)
BILIRUB SERPL-MCNC: 8.3 MG/DL — HIGH (ref 0.2–1.2)
BILIRUB SERPL-MCNC: 8.5 MG/DL — HIGH (ref 0.2–1.2)
BILIRUB SERPL-MCNC: 9.2 MG/DL — HIGH (ref 0.2–1.2)
BUN SERPL-MCNC: 32 MG/DL — HIGH (ref 7–23)
BUN SERPL-MCNC: 39 MG/DL — HIGH (ref 7–23)
BUN SERPL-MCNC: 46 MG/DL — HIGH (ref 7–23)
BURR CELLS BLD QL SMEAR: PRESENT — SIGNIFICANT CHANGE UP
CALCIUM SERPL-MCNC: 6.7 MG/DL — LOW (ref 8.4–10.5)
CALCIUM SERPL-MCNC: 6.7 MG/DL — LOW (ref 8.4–10.5)
CALCIUM SERPL-MCNC: 6.8 MG/DL — LOW (ref 8.4–10.5)
CALCIUM SERPL-MCNC: 6.8 MG/DL — LOW (ref 8.4–10.5)
CHLORIDE SERPL-SCNC: 79 MMOL/L — LOW (ref 96–108)
CHLORIDE SERPL-SCNC: 83 MMOL/L — LOW (ref 96–108)
CHLORIDE SERPL-SCNC: 83 MMOL/L — LOW (ref 96–108)
CO2 SERPL-SCNC: 30 MMOL/L — SIGNIFICANT CHANGE UP (ref 22–31)
CO2 SERPL-SCNC: 32 MMOL/L — HIGH (ref 22–31)
CO2 SERPL-SCNC: 34 MMOL/L — HIGH (ref 22–31)
CREAT SERPL-MCNC: 0.94 MG/DL — SIGNIFICANT CHANGE UP (ref 0.5–1.3)
CREAT SERPL-MCNC: 1.07 MG/DL — SIGNIFICANT CHANGE UP (ref 0.5–1.3)
CREAT SERPL-MCNC: 1.26 MG/DL — SIGNIFICANT CHANGE UP (ref 0.5–1.3)
ELLIPTOCYTES BLD QL SMEAR: SLIGHT — SIGNIFICANT CHANGE UP
EOSINOPHIL # BLD AUTO: 0 K/UL — SIGNIFICANT CHANGE UP (ref 0–0.5)
EOSINOPHIL NFR BLD AUTO: 0.6 % — SIGNIFICANT CHANGE UP (ref 0–6)
GLUCOSE BLDC GLUCOMTR-MCNC: 147 MG/DL — HIGH (ref 70–99)
GLUCOSE BLDC GLUCOMTR-MCNC: 160 MG/DL — HIGH (ref 70–99)
GLUCOSE BLDC GLUCOMTR-MCNC: 162 MG/DL — HIGH (ref 70–99)
GLUCOSE BLDC GLUCOMTR-MCNC: 182 MG/DL — HIGH (ref 70–99)
GLUCOSE BLDC GLUCOMTR-MCNC: 184 MG/DL — HIGH (ref 70–99)
GLUCOSE BLDC GLUCOMTR-MCNC: 185 MG/DL — HIGH (ref 70–99)
GLUCOSE SERPL-MCNC: 119 MG/DL — HIGH (ref 70–99)
GLUCOSE SERPL-MCNC: 139 MG/DL — HIGH (ref 70–99)
GLUCOSE SERPL-MCNC: 176 MG/DL — HIGH (ref 70–99)
HCT VFR BLD CALC: 33.1 % — LOW (ref 34.5–45)
HGB BLD-MCNC: 10.6 G/DL — LOW (ref 11.5–15.5)
HYPOCHROMIA BLD QL: SIGNIFICANT CHANGE UP
INR BLD: 2.39 RATIO — HIGH (ref 0.88–1.16)
INR BLD: 2.51 RATIO — HIGH (ref 0.88–1.16)
LYMPHOCYTES # BLD AUTO: 1 K/UL — SIGNIFICANT CHANGE UP (ref 1–3.3)
LYMPHOCYTES # BLD AUTO: 11.7 % — LOW (ref 13–44)
MAGNESIUM SERPL-MCNC: 1.4 MG/DL — LOW (ref 1.6–2.6)
MAGNESIUM SERPL-MCNC: 1.7 MG/DL — SIGNIFICANT CHANGE UP (ref 1.6–2.6)
MAGNESIUM SERPL-MCNC: 2.1 MG/DL — SIGNIFICANT CHANGE UP (ref 1.6–2.6)
MCHC RBC-ENTMCNC: 23.1 PG — LOW (ref 27–34)
MCHC RBC-ENTMCNC: 32.1 GM/DL — SIGNIFICANT CHANGE UP (ref 32–36)
MCV RBC AUTO: 72 FL — LOW (ref 80–100)
MONOCYTES # BLD AUTO: 0.8 K/UL — SIGNIFICANT CHANGE UP (ref 0–0.9)
MONOCYTES NFR BLD AUTO: 9.9 % — SIGNIFICANT CHANGE UP (ref 2–14)
NEUTROPHILS # BLD AUTO: 6.6 K/UL — SIGNIFICANT CHANGE UP (ref 1.8–7.4)
NEUTROPHILS NFR BLD AUTO: 77.8 % — HIGH (ref 43–77)
PHOSPHATE SERPL-MCNC: 3.8 MG/DL — SIGNIFICANT CHANGE UP (ref 2.5–4.5)
PHOSPHATE SERPL-MCNC: 4.6 MG/DL — HIGH (ref 2.5–4.5)
PHOSPHATE SERPL-MCNC: 4.9 MG/DL — HIGH (ref 2.5–4.5)
PLAT MORPH BLD: NORMAL — SIGNIFICANT CHANGE UP
PLATELET # BLD AUTO: 154 K/UL — SIGNIFICANT CHANGE UP (ref 150–400)
POIKILOCYTOSIS BLD QL AUTO: SLIGHT — SIGNIFICANT CHANGE UP
POTASSIUM SERPL-MCNC: 2.5 MMOL/L — CRITICAL LOW (ref 3.5–5.3)
POTASSIUM SERPL-MCNC: 2.7 MMOL/L — CRITICAL LOW (ref 3.5–5.3)
POTASSIUM SERPL-MCNC: 2.7 MMOL/L — CRITICAL LOW (ref 3.5–5.3)
POTASSIUM SERPL-SCNC: 2.5 MMOL/L — CRITICAL LOW (ref 3.5–5.3)
POTASSIUM SERPL-SCNC: 2.7 MMOL/L — CRITICAL LOW (ref 3.5–5.3)
POTASSIUM SERPL-SCNC: 2.7 MMOL/L — CRITICAL LOW (ref 3.5–5.3)
PROT SERPL-MCNC: 6.2 G/DL — SIGNIFICANT CHANGE UP (ref 6–8.3)
PROT SERPL-MCNC: 6.4 G/DL — SIGNIFICANT CHANGE UP (ref 6–8.3)
PROT SERPL-MCNC: 6.5 G/DL — SIGNIFICANT CHANGE UP (ref 6–8.3)
PROTHROM AB SERPL-ACNC: 28.2 SEC — HIGH (ref 10–12.9)
PROTHROM AB SERPL-ACNC: 29.4 SEC — HIGH (ref 10–12.9)
RBC # BLD: 4.6 M/UL — SIGNIFICANT CHANGE UP (ref 3.8–5.2)
RBC # FLD: 23 % — HIGH (ref 10.3–14.5)
RBC BLD AUTO: ABNORMAL
SCHISTOCYTES BLD QL AUTO: SLIGHT — SIGNIFICANT CHANGE UP
SODIUM SERPL-SCNC: 128 MMOL/L — LOW (ref 135–145)
SODIUM SERPL-SCNC: 129 MMOL/L — LOW (ref 135–145)
SODIUM SERPL-SCNC: 131 MMOL/L — LOW (ref 135–145)
SPHEROCYTES BLD QL SMEAR: SLIGHT — SIGNIFICANT CHANGE UP
T4 FREE SERPL-MCNC: 1.5 NG/DL — SIGNIFICANT CHANGE UP (ref 0.9–1.8)
TARGETS BLD QL SMEAR: SIGNIFICANT CHANGE UP
TSH SERPL-MCNC: 0.39 UIU/ML — SIGNIFICANT CHANGE UP (ref 0.27–4.2)
WBC # BLD: 8.5 K/UL — SIGNIFICANT CHANGE UP (ref 3.8–10.5)
WBC # FLD AUTO: 8.5 K/UL — SIGNIFICANT CHANGE UP (ref 3.8–10.5)

## 2018-11-09 PROCEDURE — 99291 CRITICAL CARE FIRST HOUR: CPT

## 2018-11-09 PROCEDURE — 93010 ELECTROCARDIOGRAM REPORT: CPT

## 2018-11-09 PROCEDURE — 99232 SBSQ HOSP IP/OBS MODERATE 35: CPT

## 2018-11-09 PROCEDURE — 71045 X-RAY EXAM CHEST 1 VIEW: CPT | Mod: 26

## 2018-11-09 RX ORDER — CEFPODOXIME PROXETIL 100 MG
200 TABLET ORAL EVERY 12 HOURS
Qty: 0 | Refills: 0 | Status: DISCONTINUED | OUTPATIENT
Start: 2018-11-09 | End: 2018-11-17

## 2018-11-09 RX ORDER — POTASSIUM CHLORIDE 20 MEQ
40 PACKET (EA) ORAL ONCE
Qty: 0 | Refills: 0 | Status: COMPLETED | OUTPATIENT
Start: 2018-11-09 | End: 2018-11-09

## 2018-11-09 RX ORDER — PHYTONADIONE (VIT K1) 5 MG
5 TABLET ORAL ONCE
Qty: 0 | Refills: 0 | Status: COMPLETED | OUTPATIENT
Start: 2018-11-09 | End: 2018-11-09

## 2018-11-09 RX ORDER — POTASSIUM CHLORIDE 20 MEQ
40 PACKET (EA) ORAL
Qty: 0 | Refills: 0 | Status: DISCONTINUED | OUTPATIENT
Start: 2018-11-09 | End: 2018-11-09

## 2018-11-09 RX ORDER — BUMETANIDE 0.25 MG/ML
2 INJECTION INTRAMUSCULAR; INTRAVENOUS DAILY
Qty: 0 | Refills: 0 | Status: DISCONTINUED | OUTPATIENT
Start: 2018-11-09 | End: 2018-11-09

## 2018-11-09 RX ORDER — MAGNESIUM SULFATE 500 MG/ML
2 VIAL (ML) INJECTION ONCE
Qty: 0 | Refills: 0 | Status: COMPLETED | OUTPATIENT
Start: 2018-11-09 | End: 2018-11-09

## 2018-11-09 RX ORDER — POTASSIUM CHLORIDE 20 MEQ
10 PACKET (EA) ORAL
Qty: 0 | Refills: 0 | Status: DISCONTINUED | OUTPATIENT
Start: 2018-11-09 | End: 2018-11-09

## 2018-11-09 RX ORDER — POTASSIUM CHLORIDE 20 MEQ
20 PACKET (EA) ORAL
Qty: 0 | Refills: 0 | Status: COMPLETED | OUTPATIENT
Start: 2018-11-09 | End: 2018-11-09

## 2018-11-09 RX ORDER — GABAPENTIN 400 MG/1
100 CAPSULE ORAL THREE TIMES A DAY
Qty: 0 | Refills: 0 | Status: DISCONTINUED | OUTPATIENT
Start: 2018-11-09 | End: 2018-11-12

## 2018-11-09 RX ORDER — POTASSIUM CHLORIDE 20 MEQ
20 PACKET (EA) ORAL ONCE
Qty: 0 | Refills: 0 | Status: COMPLETED | OUTPATIENT
Start: 2018-11-09 | End: 2018-11-09

## 2018-11-09 RX ORDER — POTASSIUM CHLORIDE 20 MEQ
40 PACKET (EA) ORAL EVERY 4 HOURS
Qty: 0 | Refills: 0 | Status: DISCONTINUED | OUTPATIENT
Start: 2018-11-09 | End: 2018-11-09

## 2018-11-09 RX ORDER — BUMETANIDE 0.25 MG/ML
2 INJECTION INTRAMUSCULAR; INTRAVENOUS DAILY
Qty: 0 | Refills: 0 | Status: DISCONTINUED | OUTPATIENT
Start: 2018-11-09 | End: 2018-11-12

## 2018-11-09 RX ORDER — POTASSIUM CHLORIDE 20 MEQ
40 PACKET (EA) ORAL
Qty: 0 | Refills: 0 | Status: COMPLETED | OUTPATIENT
Start: 2018-11-09 | End: 2018-11-09

## 2018-11-09 RX ADMIN — Medication 81 MILLIGRAM(S): at 12:43

## 2018-11-09 RX ADMIN — GABAPENTIN 100 MILLIGRAM(S): 400 CAPSULE ORAL at 15:16

## 2018-11-09 RX ADMIN — Medication 2001 MILLIGRAM(S): at 09:20

## 2018-11-09 RX ADMIN — Medication 50 MILLIEQUIVALENT(S): at 05:28

## 2018-11-09 RX ADMIN — Medication 2 TABLET(S): at 06:02

## 2018-11-09 RX ADMIN — Medication 5 MILLIGRAM(S): at 09:16

## 2018-11-09 RX ADMIN — Medication 2 TABLET(S): at 21:58

## 2018-11-09 RX ADMIN — MILRINONE LACTATE 7.26 MICROGRAM(S)/KG/MIN: 1 INJECTION, SOLUTION INTRAVENOUS at 12:43

## 2018-11-09 RX ADMIN — Medication 1: at 17:49

## 2018-11-09 RX ADMIN — Medication 2001 MILLIGRAM(S): at 17:42

## 2018-11-09 RX ADMIN — Medication 50 MILLIEQUIVALENT(S): at 03:49

## 2018-11-09 RX ADMIN — GABAPENTIN 100 MILLIGRAM(S): 400 CAPSULE ORAL at 21:59

## 2018-11-09 RX ADMIN — Medication 2 TABLET(S): at 15:16

## 2018-11-09 RX ADMIN — Medication 40 MILLIEQUIVALENT(S): at 17:34

## 2018-11-09 RX ADMIN — Medication 50 GRAM(S): at 06:10

## 2018-11-09 RX ADMIN — Medication 1: at 14:31

## 2018-11-09 RX ADMIN — Medication 2001 MILLIGRAM(S): at 12:43

## 2018-11-09 RX ADMIN — CALCITRIOL 0.5 MICROGRAM(S): 0.5 CAPSULE ORAL at 17:42

## 2018-11-09 RX ADMIN — Medication 100 MILLIEQUIVALENT(S): at 00:56

## 2018-11-09 RX ADMIN — CALCITRIOL 0.5 MICROGRAM(S): 0.5 CAPSULE ORAL at 06:02

## 2018-11-09 RX ADMIN — Medication 40 MILLIEQUIVALENT(S): at 10:21

## 2018-11-09 RX ADMIN — Medication 50 GRAM(S): at 15:50

## 2018-11-09 RX ADMIN — Medication 40 MILLIEQUIVALENT(S): at 15:16

## 2018-11-09 RX ADMIN — Medication 40 MILLIEQUIVALENT(S): at 13:09

## 2018-11-09 RX ADMIN — Medication 175 MICROGRAM(S): at 06:02

## 2018-11-09 RX ADMIN — Medication 50 GRAM(S): at 05:28

## 2018-11-09 NOTE — PROGRESS NOTE ADULT - ASSESSMENT
62yo post-menopausal F w/ pmh of CHF, thyroid ca, HTN, DM, daily ASA user,  c/o vaginal bleeding that she noticed this morning. Pt states she woke up feeling fatigued with abdominal pressure and that when she sat down, she noticed that she had bled through her underwear. Pt has not experienced any bleeding episodes like this in the past. she admits to normal urinary and bowel functions and denies any HA, dizziness, SOB, CP, N/V/D, constipation. Pt has no known uterine pathologies and has not seen a GYN in 15-20 years. Currently Pt reports fatigue and daughter states she has been in this state for the past couple weeks since she was discharged from hospital for CHF exacerbation.      Problem/Plan - 1:  ·  Problem: Acute on chronic systolic congestive heart failure.  Plan: Improving. IV Bumex and Dobutamine  . Cardiology and CHF team helping.      Problem/Plan - 2:  ·  Problem: Acute renal failure.  Plan: Creatinine better.  Renal following .     Problem/Plan - 3:  ·  Problem: Hyperkalemia.  Plan: Now Hypokalemia .Correcting.      Problem/Plan - 4:  ·  Problem: Hyponatremia.  Plan: Still low. Management per renal.      Problem/Plan - 5:  ·  Problem:  Hypocalcemia.  Plan: Chronic . Replacing IV and PO ..      Problem/Plan - 6:  Problem:  Jaundice, hepatocellular. Plan: Likely from CHF . Monitoring LFT      Problem/Plan - 7:  ·  Problem: DM (diabetes mellitus).  Plan: SSI and Lantus . Sugars in good control.      Problem/Plan - 8:  ·  Problem: UTI .  Plan: IV Abxs     Problem/Plan - 9:  ·  Problem: Hypothyroidism.  Plan: Synthroid home dose and will check TFT in am.      Problem/Plan - 10:  Problem: Vaginal bleeding. Plan; GYn consult consulted and awaiting Endometrial BX.       Problem/Plan - 11:  ·  Problem: Coagulopathy .  Plan: Sec to Liver injury. INR better.

## 2018-11-09 NOTE — PROGRESS NOTE ADULT - PROBLEM SELECTOR PLAN 1
Patient with hypoparathyroidism s/p thyroidectomy in 2000 for papillary thyroid cancer.   -Initial calcium corrected on presentation was 6.6. S/p 2G calcium gluconate IV. Current corrected calcium of 7.9  -Patient noted in Aug 2018 with PTH of 22, inappropriately normal for hypocalcemia at that time. 25 vitamin D 59. Currently phos 6.1, Mg 1.7  -Currently on calcitriol 0.5mcg BID and calcium carbonate 2 tab TID. Would continue or monitor on calcium carbonate 2 tabs TID to be taken with meals.  -c/w current dose of calcitriol. Would not increase, with the high phos.  -monitor calcium, phos, mg daily.

## 2018-11-09 NOTE — PROGRESS NOTE ADULT - SUBJECTIVE AND OBJECTIVE BOX
[facility]  Wale Tovar PA-C  [address] [place] [address]  [phone]      INTERVAL HPI/OVERNIGHT EVENTS:    lfts slowly improving     MEDICATIONS  (STANDING):  aspirin  chewable 81 milliGRAM(s) Oral daily  buMETAnide Injectable 2 milliGRAM(s) IV Push two times a day  calcitriol   Capsule 0.5 MICROGram(s) Oral two times a day  calcium acetate 2001 milliGRAM(s) Oral three times a day with meals  calcium carbonate 1250 mG  + Vitamin D (OsCal 500 + D) 2 Tablet(s) Oral three times a day  cefTRIAXone   IVPB 1 Gram(s) IV Intermittent every 24 hours  chlorhexidine 4% Liquid 1 Application(s) Topical <User Schedule>  DOBUTamine Infusion 2.5 MICROgram(s)/kG/Min (7.26 mL/Hr) IV Continuous <Continuous>  influenza   Vaccine 0.5 milliLiter(s) IntraMuscular once  levothyroxine 175 MICROGram(s) Oral daily  milrinone Infusion 0.25 MICROgram(s)/kG/Min (7.26 mL/Hr) IV Continuous <Continuous>  nystatin/triamcinolone Cream 1 Application(s) Topical every 12 hours    MEDICATIONS  (PRN):  acetaminophen   Tablet .. 650 milliGRAM(s) Oral every 6 hours PRN Moderate Pain (4 - 6)      Allergies    Isordil (Headache)  penicillin (Rash)    Intolerances        ROS:   General:  No wt loss, fevers, chills, night sweats, fatigue,   Eyes:  Good vision, no reported pain  ENT:  No sore throat, pain, runny nose, dysphagia  CV:  No pain, palpitations, hypo/hypertension  Resp:  No dyspnea, cough, tachypnea, wheezing  GI:  No pain, No nausea, No vomiting, No diarrhea, No constipation, No weight loss, No fever, No pruritis, No rectal bleeding, No tarry stools, No dysphagia,  :  No pain, bleeding, incontinence, nocturia  Muscle:  No pain, weakness  Neuro:  No weakness, tingling, memory problems  Psych:  No fatigue, insomnia, mood problems, depression  Endocrine:  No polyuria, polydipsia, cold/heat intolerance  Heme:  No petechiae, ecchymosis, easy bruisability  Skin:  No rash, tattoos, scars, edema      PHYSICAL EXAM:   Vital Signs:  Vital Signs Last 24 Hrs  T(C): 36.4 (2018 12:40), Max: 36.6 (2018 06:00)  T(F): 97.6 (2018 12:40), Max: 97.8 (2018 06:00)  HR: 94 (2018 16:00) (90 - 100)  BP: 107/67 (2018 16:00) (73/62 - 113/62)  BP(mean): 79 (2018 16:00) (67 - 89)  RR: 13 (2018 16:00) (8 - 26)  SpO2: 92% (2018 16:00) (92% - 99%)  Daily     Daily     GENERAL:  Appears stated age, well-groomed, well-nourished, no distress  HEENT:  NC/AT,  conjunctivae clear and pink, no thyromegaly, nodules, adenopathy, no JVD, sclera -anicteric  CHEST:  Full & symmetric excursion, no increased effort, breath sounds clear  HEART:  Regular rhythm, S1, S2, no murmur/rub/S3/S4, no abdominal bruit, no edema  ABDOMEN:  Soft, non-tender, non-distended, normoactive bowel sounds,  no masses ,no hepato-splenomegaly, no signs of chronic liver disease  EXTEREMITIES:  no cyanosis,clubbing or edema  SKIN:  No rash/erythema/ecchymoses/petechiae/wounds/abscess/warm/dry  NEURO:  Alert, oriented, no asterixis, no tremor, no encephalopathy      LABS:                        10.9   7.7   )-----------( 148      ( 2018 04:04 )             32.5     11-08    126<L>  |  79<L>  |  67<H>  ----------------------------<  165<H>  3.8   |  28  |  1.83<H>    Ca    6.5<LL>      2018 04:04  Phos  6.1     11-08  Mg     1.7     11-08    TPro  6.6  /  Alb  3.1<L>  /  TBili  10.2<H>  /  DBili  x   /  AST  56<H>  /  ALT  27  /  AlkPhos  338<H>      PT/INR - ( 2018 04:04 )   PT: 35.8 sec;   INR: 3.03 ratio         PTT - ( 2018 04:04 )  PTT:29.2 sec  Urinalysis Basic - ( 2018 15:45 )    Color: Dark Yellow / Appearance: Turbid / S.013 / pH: x  Gluc: x / Ketone: Negative  / Bili: Small / Urobili: 2 mg/dL   Blood: x / Protein: Trace / Nitrite: Negative   Leuk Esterase: Large / RBC: 10 /hpf /  /hpf   Sq Epi: x / Non Sq Epi: 1 /hpf / Bacteria: Many        RADIOLOGY & ADDITIONAL TESTS:

## 2018-11-09 NOTE — PROGRESS NOTE ADULT - PROBLEM SELECTOR PLAN 1
continue Milrinone 0.25 and diuresis with Bumetanide 2 mg IV Daily   will consider BB therapy when euvolemic

## 2018-11-09 NOTE — PROGRESS NOTE ADULT - SUBJECTIVE AND OBJECTIVE BOX
INTERVAL HISTORY:  ON: K2.5, Mg 1.4 (s/p 4g mg, pending 60meq K, will need more). Bumex scaled back to QD. CVP 20 this morning.     AM: No CP, SOB, palpitations,     REVIEW OF SYSTEMS: As above; all others negative    MEDICATIONS  (STANDING):  aspirin  chewable 81 milliGRAM(s) Oral daily  buMETAnide Injectable 2 milliGRAM(s) IV Push daily  calcitriol   Capsule 0.5 MICROGram(s) Oral two times a day  calcium acetate 2001 milliGRAM(s) Oral three times a day with meals  calcium carbonate 1250 mG  + Vitamin D (OsCal 500 + D) 2 Tablet(s) Oral three times a day  cefTRIAXone   IVPB 1 Gram(s) IV Intermittent every 24 hours  chlorhexidine 4% Liquid 1 Application(s) Topical <User Schedule>  influenza   Vaccine 0.5 milliLiter(s) IntraMuscular once  insulin lispro (HumaLOG) corrective regimen sliding scale   SubCutaneous three times a day before meals  insulin lispro (HumaLOG) corrective regimen sliding scale   SubCutaneous at bedtime  levothyroxine 175 MICROGram(s) Oral daily  milrinone Infusion 0.25 MICROgram(s)/kG/Min (7.26 mL/Hr) IV Continuous <Continuous>  phytonadione   Solution 5 milliGRAM(s) Oral once    MEDICATIONS  (PRN):  acetaminophen   Tablet .. 650 milliGRAM(s) Oral every 6 hours PRN Moderate Pain (4 - 6)      Objective:  ICU Vital Signs Last 24 Hrs  T(C): 36.8 (2018 06:00), Max: 36.8 (2018 06:00)  T(F): 98.3 (2018 06:00), Max: 98.3 (2018 06:00)  HR: 96 (2018 07:00) (92 - 102)  BP: 103/61 (2018 07:00) (90/65 - 114/68)  BP(mean): 76 (2018 07:00) (70 - 94)  ABP: --  ABP(mean): --  RR: 9 (2018 07:00) (9 - 28)  SpO2: 97% (2018 07:00) (92% - 99%)      Adult Advanced Hemodynamics Last 24 Hrs  CVP(mm Hg): 14 (2018 07:00) (3 - 22)  CVP(cm H2O): --  CO: --  CI: --  PA: --  PA(mean): --  PCWP: --  SVR: --  SVRI: --  PVR: --  PVRI: --       @ 07:01  -   @ 07:00  --------------------------------------------------------  IN: 1005.2 mL / OUT: 5950 mL / NET: -4944.8 mL      Daily     Daily Weight in k.2 (2018 05:00)    PHYSICAL EXAM:      Constitutional:    HEENT:    Respiratory:    Cardiovascular:    Gastrointestinal:    Genitourinary:    Extremities:    Vascular:    Neurological:    Skin:      TELEMETRY:     EKG:     IMAGING:    Labs:                          10.9   7.7   )-----------( 148      ( 2018 04:04 )             32.5         128<L>  |  79<L>  |  46<H>  ----------------------------<  119<H>  2.5<LL>   |  30  |  1.26    Ca    6.8<L>      2018 00:56  Phos  4.9       Mg     1.4         TPro  6.5  /  Alb  3.0<L>  /  TBili  9.2<H>  /  DBili  x   /  AST  55<H>  /  ALT  28  /  AlkPhos  335<H>      LIVER FUNCTIONS - ( 2018 00:56 )  Alb: 3.0 g/dL / Pro: 6.5 g/dL / ALK PHOS: 335 U/L / ALT: 28 U/L / AST: 55 U/L / GGT: x           PT/INR - ( 2018 06:50 )   PT: 28.2 sec;   INR: 2.39 ratio         PTT - ( 2018 04:04 )  PTT:29.2 sec    Urinalysis Basic - ( 2018 15:45 )    Color: Dark Yellow / Appearance: Turbid / S.013 / pH: x  Gluc: x / Ketone: Negative  / Bili: Small / Urobili: 2 mg/dL   Blood: x / Protein: Trace / Nitrite: Negative   Leuk Esterase: Large / RBC: 10 /hpf /  /hpf   Sq Epi: x / Non Sq Epi: 1 /hpf / Bacteria: Many        HEALTH ISSUES - PROBLEM Dx:  Type 2 diabetes mellitus without complication, without long-term current use of insulin: Type 2 diabetes mellitus without complication, without long-term current use of insulin  Postoperative hypothyroidism: Postoperative hypothyroidism  Hypoparathyroidism: Hypoparathyroidism  Non-rheumatic mitral regurgitation: Non-rheumatic mitral regurgitation  Elevated liver enzymes: Elevated liver enzymes  UTI (urinary tract infection): UTI (urinary tract infection)  Cardiogenic shock: Cardiogenic shock  Postmenopausal bleeding: Postmenopausal bleeding  Vaginal bleeding: Vaginal bleeding  Hypothyroidism: Hypothyroidism  HTN (hypertension): HTN (hypertension)  DM (diabetes mellitus): DM (diabetes mellitus)  Hyponatremia: Hyponatremia  Hyperkalemia: Hyperkalemia  Acute renal failure: Acute renal failure  Acute on chronic systolic congestive heart failure: Acute on chronic systolic congestive heart failure INTERVAL HISTORY:  ON: K2.5, Mg 1.4 (s/p 4g mg, pending 60meq K, will need more). Bumex scaled back to QD. CVP 20 this morning.     AM: No CP, SOB, palpitations, N/V, or diaphoresis. Continues to have bl lower leg pain.     REVIEW OF SYSTEMS: As above; all others negative    MEDICATIONS  (STANDING):  aspirin  chewable 81 milliGRAM(s) Oral daily  buMETAnide Injectable 2 milliGRAM(s) IV Push daily  calcitriol   Capsule 0.5 MICROGram(s) Oral two times a day  calcium acetate 2001 milliGRAM(s) Oral three times a day with meals  calcium carbonate 1250 mG  + Vitamin D (OsCal 500 + D) 2 Tablet(s) Oral three times a day  cefTRIAXone   IVPB 1 Gram(s) IV Intermittent every 24 hours  chlorhexidine 4% Liquid 1 Application(s) Topical <User Schedule>  influenza   Vaccine 0.5 milliLiter(s) IntraMuscular once  insulin lispro (HumaLOG) corrective regimen sliding scale   SubCutaneous three times a day before meals  insulin lispro (HumaLOG) corrective regimen sliding scale   SubCutaneous at bedtime  levothyroxine 175 MICROGram(s) Oral daily  milrinone Infusion 0.25 MICROgram(s)/kG/Min (7.26 mL/Hr) IV Continuous <Continuous>  phytonadione   Solution 5 milliGRAM(s) Oral once    MEDICATIONS  (PRN):  acetaminophen   Tablet .. 650 milliGRAM(s) Oral every 6 hours PRN Moderate Pain (4 - 6)      Objective:  ICU Vital Signs Last 24 Hrs  T(C): 36.8 (2018 06:00), Max: 36.8 (2018 06:00)  T(F): 98.3 (2018 06:00), Max: 98.3 (2018 06:00)  HR: 96 (2018 07:00) (92 - 102)  BP: 103/61 (2018 07:00) (90/65 - 114/68)  BP(mean): 76 (2018 07:00) (70 - 94)  ABP: --  ABP(mean): --  RR: 9 (2018 07:00) (9 - 28)  SpO2: 97% (2018 07:00) (92% - 99%)      Adult Advanced Hemodynamics Last 24 Hrs  CVP(mm Hg): 14 (2018 07:00) (3 - 22)  CVP(cm H2O): --  CO: --  CI: --  PA: --  PA(mean): --  PCWP: --  SVR: --  SVRI: --  PVR: --  PVRI: --       @ 07:01  -   @ 07:00  --------------------------------------------------------  IN: 1005.2 mL / OUT: 5950 mL / NET: -4944.8 mL      Daily     Daily Weight in k.2 (2018 05:00)    PHYSICAL EXAM:  GENERAL: NAD, well-developed  HEAD:  Atraumatic, Normocephalic  EYES: EOMI, PERRLA, conjunctiva and sclera clear  NECK: Supple, +JVD to the ear  CHEST/LUNG: Clear to auscultation bilaterally; Mild expiratory wheeze  HEART: Soft heart sounds, NL s1s2, regular rate and rhythm; No murmurs, rubs, or gallops  ABDOMEN: Soft, Nontender, Nondistended; Bowel sounds present  EXTREMITIES:  2+ radial pulses bl. Pedal pulses not palpable, but extremities are warm. Edema improved to 1+  PSYCH: AAOx3  NEUROLOGY: non-focal  SKIN: No rashes or lesions    TELEMETRY: V-paced    EKG:     IMAGING:    Labs:                          10.9   7.7   )-----------( 148      ( 2018 04:04 )             32.5         128<L>  |  79<L>  |  46<H>  ----------------------------<  119<H>  2.5<LL>   |  30  |  1.26    Ca    6.8<L>      2018 00:56  Phos  4.9       Mg     1.4         TPro  6.5  /  Alb  3.0<L>  /  TBili  9.2<H>  /  DBili  x   /  AST  55<H>  /  ALT  28  /  AlkPhos  335<H>      LIVER FUNCTIONS - ( 2018 00:56 )  Alb: 3.0 g/dL / Pro: 6.5 g/dL / ALK PHOS: 335 U/L / ALT: 28 U/L / AST: 55 U/L / GGT: x           PT/INR - ( 2018 06:50 )   PT: 28.2 sec;   INR: 2.39 ratio         PTT - ( 2018 04:04 )  PTT:29.2 sec    Urinalysis Basic - ( 2018 15:45 )    Color: Dark Yellow / Appearance: Turbid / S.013 / pH: x  Gluc: x / Ketone: Negative  / Bili: Small / Urobili: 2 mg/dL   Blood: x / Protein: Trace / Nitrite: Negative   Leuk Esterase: Large / RBC: 10 /hpf /  /hpf   Sq Epi: x / Non Sq Epi: 1 /hpf / Bacteria: Many        HEALTH ISSUES - PROBLEM Dx:  Type 2 diabetes mellitus without complication, without long-term current use of insulin: Type 2 diabetes mellitus without complication, without long-term current use of insulin  Postoperative hypothyroidism: Postoperative hypothyroidism  Hypoparathyroidism: Hypoparathyroidism  Non-rheumatic mitral regurgitation: Non-rheumatic mitral regurgitation  Elevated liver enzymes: Elevated liver enzymes  UTI (urinary tract infection): UTI (urinary tract infection)  Cardiogenic shock: Cardiogenic shock  Postmenopausal bleeding: Postmenopausal bleeding  Vaginal bleeding: Vaginal bleeding  Hypothyroidism: Hypothyroidism  HTN (hypertension): HTN (hypertension)  DM (diabetes mellitus): DM (diabetes mellitus)  Hyponatremia: Hyponatremia  Hyperkalemia: Hyperkalemia  Acute renal failure: Acute renal failure  Acute on chronic systolic congestive heart failure: Acute on chronic systolic congestive heart failure

## 2018-11-09 NOTE — PROGRESS NOTE ADULT - SUBJECTIVE AND OBJECTIVE BOX
Chief Complaint: Evaluating this 64 y/o F for thyroid cancer s/p total thyroidectomy with post-operative hypothyroidism and hypoparathyroidism.       Interval History: Calcium stable. Denies perioral paresthesias. No chest pain or SOB. Complains of LE pain from ankle to knee.     MEDICATIONS  (STANDING):  aspirin  chewable 81 milliGRAM(s) Oral daily  buMETAnide Injectable 2 milliGRAM(s) IV Push daily  calcitriol   Capsule 0.5 MICROGram(s) Oral two times a day  calcium acetate 2001 milliGRAM(s) Oral three times a day with meals  calcium carbonate 1250 mG  + Vitamin D (OsCal 500 + D) 2 Tablet(s) Oral three times a day  cefTRIAXone   IVPB 1 Gram(s) IV Intermittent every 24 hours  gabapentin 100 milliGRAM(s) Oral three times a day  influenza   Vaccine 0.5 milliLiter(s) IntraMuscular once  insulin lispro (HumaLOG) corrective regimen sliding scale   SubCutaneous three times a day before meals  insulin lispro (HumaLOG) corrective regimen sliding scale   SubCutaneous at bedtime  levothyroxine 175 MICROGram(s) Oral daily  magnesium sulfate  IVPB 2 Gram(s) IV Intermittent once  milrinone Infusion 0.25 MICROgram(s)/kG/Min (7.26 mL/Hr) IV Continuous <Continuous>  potassium chloride    Tablet ER 40 milliEquivalent(s) Oral every 2 hours    MEDICATIONS  (PRN):      Allergies    Isordil (Headache)  penicillin (Rash)    Intolerances      Review of Systems:  Constitutional: No fever  Eyes: No blurry vision  Cardiovascular: No chest pain  Respiratory: No SOB  GI: No abdominal pain, No nausea, No vomiting  Endocrine: as noted in HPI    All other negative      PHYSICAL EXAM:  VITALS: T(C): 36.4 (11-09-18 @ 14:00)  T(F): 97.5 (11-09-18 @ 14:00), Max: 98.3 (11-09-18 @ 06:00)  HR: 94 (11-09-18 @ 14:00) (90 - 102)  BP: 103/61 (11-09-18 @ 14:00) (88/69 - 114/68)  RR:  (9 - 28)  SpO2:  (92% - 99%)  Wt(kg): --  GENERAL: NAD at this time  EYES: EOMI, No proptosis  HEENT:  Atraumatic, Normocephalic,   RESPIRATORY: Clear to auscultation bilaterally, full excursion, non labored  CARDIOVASCULAR: Regular rhythm; normal S1/S2, +b/l LE peripheral edema  GI: Soft, nontender, non distended, normal bowel sounds  SKIN: Warm and dry  PSYCH: normal affect, normal mood      POCT Blood Glucose.: 184 mg/dL (11-09-18 @ 14:30)  POCT Blood Glucose.: 162 mg/dL (11-09-18 @ 13:10)  POCT Blood Glucose.: 147 mg/dL (11-09-18 @ 08:30)  POCT Blood Glucose.: 160 mg/dL (11-08-18 @ 22:46)  POCT Blood Glucose.: 127 mg/dL (11-08-18 @ 12:14)  POCT Blood Glucose.: 157 mg/dL (11-08-18 @ 08:12)  POCT Blood Glucose.: 189 mg/dL (11-07-18 @ 22:40)  POCT Blood Glucose.: 169 mg/dL (11-07-18 @ 17:14)  POCT Blood Glucose.: 163 mg/dL (11-07-18 @ 13:02)  POCT Blood Glucose.: 142 mg/dL (11-07-18 @ 08:14)        11-09    131<L>  |  83<L>  |  32<H>  ----------------------------<  176<H>  2.7<LL>   |  34<H>  |  0.94    EGFR if : 75  EGFR if non : 65    Ca    6.7<L>      11-09  Mg     1.7     11-09  Phos  3.8     11-09    TPro  6.2  /  Alb  2.7<L>  /  TBili  8.3<H>  /  DBili  x   /  AST  54<H>  /  ALT  25  /  AlkPhos  320<H>  11-09        Thyroid Function Tests:  11-08 @ 09:24 TSH 0.39 FreeT4 1.5 T3 -- Anti TPO -- Anti Thyroglobulin Ab -- TSI --      Hemoglobin A1C, Whole Blood: 6.0 % <H> [4.0 - 5.6] (11-08-18 @ 08:09)  Hemoglobin A1C, Whole Blood: 6.4 % <H> [4.0 - 5.6] (08-21-18 @ 07:47)

## 2018-11-09 NOTE — CHART NOTE - NSCHARTNOTEFT_GEN_A_CORE
CCU Transfer Note    Transfer from: CCU  Transfer to:  (  ) Medicine    ( x ) Telemetry    (  ) RCU    (  ) Palliative    (  ) Stroke Unit    (  ) _______________  Accepting physician:      CCU COURSE:  64 yo post-menopausal female w/t a pmh of HFrEF 2/2 NICM, papillary thyroid ca s/p thyroidectomy c/b hypoparathyroidism, HTN, and DM who presented for vaginal bleeding x1 day.  Pt states she woke up feeling fatigued with abdominal pressure and that when she sat down, she noticed that she had bled through her underwear. Pt has not experienced any bleeding episodes like this in the past. she admits to normal urinary and bowel functions and denies any HA, dizziness, SOB, CP, N/V/D, constipation. Pt has no known uterine pathologies and has not seen a GYN in 15-20 years. Currently Pt reports fatigue and daughter states she has been in this state for the past couple weeks since she was discharged from hospital for CHF exacerbation.    In the ED, she was noted to be in acute decompensated heart failure c/b MAGNUS, hyponatremia, hyperkalemia, hypocalcemia, and hyperphophatemia so she was then admitted to the CCU. In the CCU she has been aggressively diuresed using bumex w/t a dobutamine gtt. She         ASSESSMENT & PLAN:         For Follow-Up:          Vital Signs Last 24 Hrs  T(C): 36.8 (09 Nov 2018 06:00), Max: 36.8 (09 Nov 2018 06:00)  T(F): 98.3 (09 Nov 2018 06:00), Max: 98.3 (09 Nov 2018 06:00)  HR: 94 (09 Nov 2018 12:00) (90 - 102)  BP: 108/56 (09 Nov 2018 12:00) (90/65 - 114/68)  BP(mean): 73 (09 Nov 2018 12:00) (65 - 94)  RR: 18 (09 Nov 2018 12:00) (9 - 28)  SpO2: 98% (09 Nov 2018 12:00) (92% - 99%)  I&O's Summary    08 Nov 2018 07:01  -  09 Nov 2018 07:00  --------------------------------------------------------  IN: 1012.5 mL / OUT: 5950 mL / NET: -4937.5 mL    09 Nov 2018 07:01  -  09 Nov 2018 14:42  --------------------------------------------------------  IN: 283.8 mL / OUT: 1100 mL / NET: -816.2 mL          MEDICATIONS  (STANDING):  aspirin  chewable 81 milliGRAM(s) Oral daily  buMETAnide Injectable 2 milliGRAM(s) IV Push daily  calcitriol   Capsule 0.5 MICROGram(s) Oral two times a day  calcium acetate 2001 milliGRAM(s) Oral three times a day with meals  calcium carbonate 1250 mG  + Vitamin D (OsCal 500 + D) 2 Tablet(s) Oral three times a day  cefTRIAXone   IVPB 1 Gram(s) IV Intermittent every 24 hours  chlorhexidine 4% Liquid 1 Application(s) Topical <User Schedule>  gabapentin 100 milliGRAM(s) Oral three times a day  influenza   Vaccine 0.5 milliLiter(s) IntraMuscular once  insulin lispro (HumaLOG) corrective regimen sliding scale   SubCutaneous three times a day before meals  insulin lispro (HumaLOG) corrective regimen sliding scale   SubCutaneous at bedtime  levothyroxine 175 MICROGram(s) Oral daily  milrinone Infusion 0.25 MICROgram(s)/kG/Min (7.26 mL/Hr) IV Continuous <Continuous>  potassium chloride    Tablet ER 40 milliEquivalent(s) Oral every 2 hours    MEDICATIONS  (PRN):  acetaminophen   Tablet .. 650 milliGRAM(s) Oral every 6 hours PRN Moderate Pain (4 - 6)        LABS                                            10.6                  Neurophils% (auto):   77.8   (11-09 @ 06:50):    8.5  )-----------(154          Lymphocytes% (auto):  11.7                                          33.1                   Eosinphils% (auto):   0.6      Manual%: Neutrophils x    ; Lymphocytes x    ; Eosinophils x    ; Bands%: x    ; Blasts x                                    129    |  83     |  39                  Calcium: 6.8   / iCa: x      (11-09 @ 08:50)    ----------------------------<  139       Magnesium: 2.1                              2.7     |  32     |  1.07             Phosphorous: 4.6      TPro  6.4    /  Alb  2.8    /  TBili  8.5    /  DBili  x      /  AST  53     /  ALT  24     /  AlkPhos  328    09 Nov 2018 08:50    ( 11-09 @ 06:50 )   PT: 28.2 sec;   INR: 2.39 ratio  aPTT: x CCU Transfer Note    Transfer from: CCU  Transfer to:  (  ) Medicine    ( x ) Telemetry    (  ) RCU    (  ) Palliative    (  ) Stroke Unit    (  ) _______________  Accepting physician: Dr. Rojas      CCU COURSE:  62 yo post-menopausal female w/t a pmh of HFrEF 2/2 NICM, papillary thyroid ca s/p thyroidectomy c/b hypoparathyroidism, HTN, and DM who presented for vaginal bleeding x1 day.  Pt states she woke up feeling fatigued with abdominal pressure and that when she sat down, she noticed that she had bled through her underwear. Pt has not experienced any bleeding episodes like this in the past. she admits to normal urinary and bowel functions and denies any HA, dizziness, SOB, CP, N/V/D, constipation. Pt has no known uterine pathologies and has not seen a GYN in 15-20 years. Currently Pt reports fatigue and daughter states she has been in this state for the past couple weeks since she was discharged from hospital for CHF exacerbation.    In the ED, she had a GYN consult and TVUS showing endometrial thickening. While in the ED she was noted to be in acute decompensated heart failure c/b MAGNUS, hyponatremia, hyperkalemia, hypocalcemia, and hyperphophatemia so she was then admitted to the CCU. In the CCU she has been aggressively diuresed using bumex w/t a dobutamine gtt. She also had a TLC placed in her RIJ showing elevated CVP to 21. Her TTE showed severely reduced LVEF of 9% w/t severe TVR and mod-severe MVR. She was also had a + UA and w/t burning on urination and was started on ceftriaxone (completes a 7 day course on 11/13). After her CVPs improved she was weaned off of dobutamine and transitioned to milrinone. She is now ready for transfer to the telemetry floor.         ASSESSMENT & PLAN:   Assessment:  62yo post-menopausal F w/t a pmh of HFrEF 2/2 NICM, papillary thyroid ca s/p thyroidectomy c/b hypoparathyroidism, HTN, and pre-DM who presented w/t vaginal bleeding x1d and was found to be in acute on chronic decompensated HFrEF (9%) w/t a course further c/b UTI      Plan    #Neuro  AAOx4  Diabetic neuropathy  -Gabapentin 100mg PO TID    #Cardiovascular  Cardiogenic Shock 2/2 acute on chronic decompensated HFrEF 9% c/b severe TR  -CVPs were low all day, but elevated to 20 this morning  -0.25 milrinone gtt, plan for tunneled cath vs. PICC for palliative home tx  -decrease bumex today 2mg IV   -Strict Is/Os  -Lactate trending down  -ICD interrogation unremarkable  -Appreciate HF recs: BB when euvolemic  -Palliative care consult    -Interventional cards recs: No indication for ischemic eval at this time.  -EP recs: once euvolemic, may try turning off BiV pacing to see if she responds better    Tricuspid regurgitation  -Structural Cards was consulted  -Will likely need repeat TTE/ANDER and LHC for full eval once euvolemic    #Pulmonary  Cough likely 2/2 acute CHF  -Keep head of bed above 30degrees  -O2 support PRN to maintain goal SpO2 > 90  -Robitussin    #Renal  MAGNUS on chronic CKD 2/2 prerenal azotemia 2/2 ADHF - resolved  sCr 1.26, back to baseline  -Strict I/Os  -avoid nephrotoxins    Hyponatremia likley 2/2 SIADH from ADHF   -Improving at an appropriate rate, no acute intervention as asymptomatic  -will monitor Q12    Hypokalemia  -getting 60meq this morning will need more this afternoon. Recheck after 2 doses of 40meq PO  -Hold entresto    Hypoparathyroidism  Inappropriately normal PTH in setting of hypocalcemia  -Endocrine recs as below    Hypocalcemia  -c/w 2T CaCO3 TID w/t meals & calcitriol supp    Hyperphosphatemia  -increase phoslo to 2001mg tid w/t meals    #Infectious disease  UTI  -c/w CTX until 7/13 to complete 7 day course    #Hematology/Oncology  Elevated INR 2/2 congestive hepatopathy 2/2 CHF  -CHF Tx as above  -Additional dose of Vit K today  -INR improving    Microcytic anemia likely 2/2 vaginal bleed  -Pelvic US showing thickened endometrial stripe  -GYN recs: endometrial bx once INR improves  -May consider iron studies    #Gastrointestinal  Congestive hepatopathy 2/2 CHF, however, bili higher than expected per GI - all numbers improving  -CHF Tx as above  -Hold statin  -May consider transjugular liver biopsy w/t pressure measurement once more stable per GI      #Endocrine  Acquired Hypoparathyroidism 2/2 thyroidectomy  -Endocrine consult    Hypocalcemia  -c/w CaCO3 & calcitriol supp    Hyperphosphatemia  -increase phoslo to 2001mg tid w/t meals    Hypothyroidism  -TSH wnl  -Free T4 pending  -c/w levothyroxine 175mcg qd    Prediabetes  -Hba1c 6.0   -FSG with meals and qhs  -SSI    #Psychiatric  No active issues    #FEN  Carb consistent DASH diet    #PPx  SCDs 2/2 elevated INR    Emmett Tesfaye MD  Internal Medicine, PGY-1  Pager [phone]        For Follow-Up:  [] Palliative care recs  [] HF recs: PICC line placement once INR < 1.5, to be discharged on milrinone. BB once HF approves.   [] EP recs: may try turning off biV pacing once euvolemic  [] TTE once euvolemic for structural cards  [] Endometrial bx once INR stable  [] Endo recs  [] Nephro recs      Vital Signs Last 24 Hrs  T(C): 36.8 (09 Nov 2018 06:00), Max: 36.8 (09 Nov 2018 06:00)  T(F): 98.3 (09 Nov 2018 06:00), Max: 98.3 (09 Nov 2018 06:00)  HR: 94 (09 Nov 2018 12:00) (90 - 102)  BP: 108/56 (09 Nov 2018 12:00) (90/65 - 114/68)  BP(mean): 73 (09 Nov 2018 12:00) (65 - 94)  RR: 18 (09 Nov 2018 12:00) (9 - 28)  SpO2: 98% (09 Nov 2018 12:00) (92% - 99%)  I&O's Summary    08 Nov 2018 07:01  -  09 Nov 2018 07:00  --------------------------------------------------------  IN: 1012.5 mL / OUT: 5950 mL / NET: -4937.5 mL    09 Nov 2018 07:01  -  09 Nov 2018 14:42  --------------------------------------------------------  IN: 283.8 mL / OUT: 1100 mL / NET: -816.2 mL          MEDICATIONS  (STANDING):  aspirin  chewable 81 milliGRAM(s) Oral daily  buMETAnide Injectable 2 milliGRAM(s) IV Push daily  calcitriol   Capsule 0.5 MICROGram(s) Oral two times a day  calcium acetate 2001 milliGRAM(s) Oral three times a day with meals  calcium carbonate 1250 mG  + Vitamin D (OsCal 500 + D) 2 Tablet(s) Oral three times a day  cefTRIAXone   IVPB 1 Gram(s) IV Intermittent every 24 hours  chlorhexidine 4% Liquid 1 Application(s) Topical <User Schedule>  gabapentin 100 milliGRAM(s) Oral three times a day  influenza   Vaccine 0.5 milliLiter(s) IntraMuscular once  insulin lispro (HumaLOG) corrective regimen sliding scale   SubCutaneous three times a day before meals  insulin lispro (HumaLOG) corrective regimen sliding scale   SubCutaneous at bedtime  levothyroxine 175 MICROGram(s) Oral daily  milrinone Infusion 0.25 MICROgram(s)/kG/Min (7.26 mL/Hr) IV Continuous <Continuous>  potassium chloride    Tablet ER 40 milliEquivalent(s) Oral every 2 hours    MEDICATIONS  (PRN):  acetaminophen   Tablet .. 650 milliGRAM(s) Oral every 6 hours PRN Moderate Pain (4 - 6)        LABS                                            10.6                  Neurophils% (auto):   77.8   (11-09 @ 06:50):    8.5  )-----------(154          Lymphocytes% (auto):  11.7                                          33.1                   Eosinphils% (auto):   0.6      Manual%: Neutrophils x    ; Lymphocytes x    ; Eosinophils x    ; Bands%: x    ; Blasts x                                    129    |  83     |  39                  Calcium: 6.8   / iCa: x      (11-09 @ 08:50)    ----------------------------<  139       Magnesium: 2.1                              2.7     |  32     |  1.07             Phosphorous: 4.6      TPro  6.4    /  Alb  2.8    /  TBili  8.5    /  DBili  x      /  AST  53     /  ALT  24     /  AlkPhos  328    09 Nov 2018 08:50    ( 11-09 @ 06:50 )   PT: 28.2 sec;   INR: 2.39 ratio  aPTT: x

## 2018-11-09 NOTE — PROGRESS NOTE ADULT - SUBJECTIVE AND OBJECTIVE BOX
No pain, no shortness of breath  On Primacor gtt      VITAL:  T(C): , Max: 36.8 (18 @ 06:00)  T(F): , Max: 98.3 (18 @ 06:00)  HR: 94 (18 @ 09:00)  BP: 96/64 (18 @ 09:00)  BP(mean): 73 (18 @ 09:00)  RR: 20 (18 @ 09:00)  SpO2: 97% (18 @ 09:00)  urine output 5950cc/24h      PHYSICAL EXAM:  Constitutional: NAD, Alert  HEENT: NCAT, MMM  Neck: Supple, (+) large JVD  Respiratory: decreased BS b/l bases  Cardiovascular: RRR s1s2, no m/r/g  Gastrointestinal: BS+, soft, NT, (+)large distension  Extremities: 2-3+ b/l LE edema  Neurological: no focal deficits; strength grossly intact  Back: no CVAT b/l  Skin: No rashes, no nevi      LABS:                        10.6   8.5   )-----------( 154      ( 2018 06:50 )             33.1    Na(128)/K(2.5)/Cl(79)/HCO3(30)/BUN(46)/Cr(1.26)Glu(119)/Ca(6.8)/Mg(1.4)/PO4(4.9)     @ 00:56  Na(126)/K(3.8)/Cl(79)/HCO3(28)/BUN(67)/Cr(1.83)Glu(165)/Ca(6.5)/Mg(1.7)/PO4(6.1)     @ 04:04  Na(122)/K(4.6)/Cl(79)/HCO3(22)/BUN(74)/Cr(2.05)Glu(156)/Ca(6.0)/Mg(1.8)/PO4(6.5)     @ 13:01  Na(123)/K(4.5)/Cl(80)/HCO3(21)/BUN(76)/Cr(1.98)Glu(150)/Ca(5.9)/Mg(2.0)/PO4(6.5)     @ 03:59  Na(123)/K(4.5)/Cl(77)/HCO3(22)/BUN(72)/Cr(1.98)Glu(151)/Ca(5.9)/Mg(1.8)/PO4(6.6)     @ 21:13  Na(121)/K(6.1)/Cl(75)/HCO3(23)/BUN(74)/Cr(2.02)Glu(142)/Ca(6.2)/Mg(--)/PO4(--)     @ 15:24  Na(116)/K(6.4)/Cl(73)/HCO3(17)/BUN(75)/Cr(1.95)Glu(168)/Ca(6.1)/Mg(--)/PO4(--)     @ 13:24    Urinalysis Basic - ( 2018 15:45 )  Color: Dark Yellow / Appearance: Turbid / S.013 / pH: x  Gluc: x / Ketone: Negative  / Bili: Small / Urobili: 2 mg/dL   Blood: x / Protein: Trace / Nitrite: Negative   Leuk Esterase: Large / RBC: 10 /hpf /  /hpf   Sq Epi: x / Non Sq Epi: 1 /hpf / Bacteria: Many      ASSESSMENT: 63F w/ HTN, DM2, thyroid CA s/p resection, acquired hypoparathyroidism, and severe HFrEF, 18 a/w vaginal bleeding/MAGNUS/electrolyte derangements/ acute on chronic HFrEF    (1)Renal - MAGNUS - Prerenally mediated in setting of decompensated CHF. Much improved today -due to improved cardiac function induced by Primacor    (2)Hypokalemia - due to diuresis    (3)Hypomagnesemia - due to diuresis    (4)Bone - hypocalcemia/hyperphosphatemia due to hypoparathyroidism (s/p iatrogenic parathyroidectomy). Very chronic. Calcium slowly improving    (5)Hyponatremia - due to decompensated CHF. Slowly improving.    (6)CV - slowly improving, with Primacor-assisted diuresis        RECOMMEND:  (1)Primacor + Bumex gtts as ordered  (2)Oscal/Phoslo/Calcitriol as ordered  (3)1 Liter free water restriction  (4)K+ repletion - KCl 20meq IV x 2 and 20meq PO x 2  (5)Mg++ repletion - 2gm IV x 1  (6)BMP+Mg at least q12h  (7)Dose new meds for GFR 40-50ml/min (present dosing is acceptable)          Brian Henry MD  [facility]  [phone] (+)persistent b/l leg pain; no shortness of breath  On Primacor gtt      VITAL:  T(C): , Max: 36.8 (18 @ 06:00)  T(F): , Max: 98.3 (18 @ 06:00)  HR: 94 (18 @ 09:00)  BP: 96/64 (18 @ 09:00)  BP(mean): 73 (18 @ 09:00)  RR: 20 (18 @ 09:00)  SpO2: 97% (18 @ :00)  urine output 5950cc/24h      PHYSICAL EXAM:  Constitutional: NAD, Alert  HEENT: NCAT, MMM  Neck: Supple, (+) large JVD  Respiratory: decreased BS b/l bases  Cardiovascular: RRR s1s2, no m/r/g  Gastrointestinal: BS+, soft, NT, (+)large distension  Extremities: 2-3+ b/l LE edema  Neurological: no focal deficits; strength grossly intact  Back: no CVAT b/l  Skin: No rashes, no nevi      LABS:                        10.6   8.5   )-----------( 154      ( 2018 06:50 )             33.1    Na(128)/K(2.5)/Cl(79)/HCO3(30)/BUN(46)/Cr(1.26)Glu(119)/Ca(6.8)/Mg(1.4)/PO4(4.9)     @ 00:56  Na(126)/K(3.8)/Cl(79)/HCO3(28)/BUN(67)/Cr(1.83)Glu(165)/Ca(6.5)/Mg(1.7)/PO4(6.1)     @ 04:04  Na(122)/K(4.6)/Cl(79)/HCO3(22)/BUN(74)/Cr(2.05)Glu(156)/Ca(6.0)/Mg(1.8)/PO4(6.5)     @ 13:01  Na(123)/K(4.5)/Cl(80)/HCO3(21)/BUN(76)/Cr(1.98)Glu(150)/Ca(5.9)/Mg(2.0)/PO4(6.5)     @ 03:59  Na(123)/K(4.5)/Cl(77)/HCO3(22)/BUN(72)/Cr(1.98)Glu(151)/Ca(5.9)/Mg(1.8)/PO4(6.6)     @ 21:13  Na(121)/K(6.1)/Cl(75)/HCO3(23)/BUN(74)/Cr(2.02)Glu(142)/Ca(6.2)/Mg(--)/PO4(--)     @ 15:24  Na(116)/K(6.4)/Cl(73)/HCO3(17)/BUN(75)/Cr(1.95)Glu(168)/Ca(6.1)/Mg(--)/PO4(--)     @ 13:24    Urinalysis Basic - ( 2018 15:45 )  Color: Dark Yellow / Appearance: Turbid / S.013 / pH: x  Gluc: x / Ketone: Negative  / Bili: Small / Urobili: 2 mg/dL   Blood: x / Protein: Trace / Nitrite: Negative   Leuk Esterase: Large / RBC: 10 /hpf /  /hpf   Sq Epi: x / Non Sq Epi: 1 /hpf / Bacteria: Many      ASSESSMENT: 63F w/ HTN, DM2, thyroid CA s/p resection, acquired hypoparathyroidism, and severe HFrEF, 18 a/w vaginal bleeding/MAGNUS/electrolyte derangements/ acute on chronic HFrEF    (1)Renal - MAGNUS - Prerenally mediated in setting of decompensated CHF. Much improved today -due to improved cardiac function induced by Primacor    (2)Hypokalemia - due to diuresis    (3)Hypomagnesemia - due to diuresis    (4)Bone - hypocalcemia/hyperphosphatemia due to hypoparathyroidism (s/p iatrogenic parathyroidectomy). Very chronic. Calcium slowly improving    (5)Hyponatremia - due to decompensated CHF. Slowly improving.    (6)CV - slowly improving, with Primacor-assisted diuresis      RECOMMEND:  (1)Primacor + Bumex gtts as ordered  (2)Oscal/Phoslo/Calcitriol as ordered  (3)1 Liter free water restriction  (4)K+ repletion - KCl 20meq IV x 2 and 20meq PO x 2  (5)Mg++ repletion - 2gm IV x 1  (6)BMP+Mg at least q12h  (7)Dose new meds for GFR 40-50ml/min (present dosing is acceptable)          Brian Henry MD  [facility]  [phone]

## 2018-11-09 NOTE — PROGRESS NOTE ADULT - SUBJECTIVE AND OBJECTIVE BOX
EP ATTENDING    tele: NSR, biv pacing    no palpitations, no syncope, no angina. dyspnea improved    acetaminophen   Tablet .. 650 milliGRAM(s) Oral every 6 hours PRN  aspirin  chewable 81 milliGRAM(s) Oral daily  buMETAnide Injectable 2 milliGRAM(s) IV Push daily  calcitriol   Capsule 0.5 MICROGram(s) Oral two times a day  calcium acetate 2001 milliGRAM(s) Oral three times a day with meals  calcium carbonate 1250 mG  + Vitamin D (OsCal 500 + D) 2 Tablet(s) Oral three times a day  cefTRIAXone   IVPB 1 Gram(s) IV Intermittent every 24 hours  chlorhexidine 4% Liquid 1 Application(s) Topical <User Schedule>  gabapentin 100 milliGRAM(s) Oral three times a day  influenza   Vaccine 0.5 milliLiter(s) IntraMuscular once  insulin lispro (HumaLOG) corrective regimen sliding scale   SubCutaneous three times a day before meals  insulin lispro (HumaLOG) corrective regimen sliding scale   SubCutaneous at bedtime  levothyroxine 175 MICROGram(s) Oral daily  milrinone Infusion 0.25 MICROgram(s)/kG/Min IV Continuous <Continuous>  potassium chloride    Tablet ER 40 milliEquivalent(s) Oral every 2 hours                            10.6   8.5   )-----------( 154      ( 09 Nov 2018 06:50 )             33.1       11-09    129<L>  |  83<L>  |  39<H>  ----------------------------<  139<H>  2.7<LL>   |  32<H>  |  1.07    Ca    6.8<L>      09 Nov 2018 08:50  Phos  4.6     11-09  Mg     2.1     11-09    TPro  6.4  /  Alb  2.8<L>  /  TBili  8.5<H>  /  DBili  x   /  AST  53<H>  /  ALT  24  /  AlkPhos  328<H>  11-09        T(C): 36.8 (11-09-18 @ 06:00), Max: 36.8 (11-09-18 @ 06:00)  HR: 94 (11-09-18 @ 12:00) (90 - 102)  BP: 108/56 (11-09-18 @ 12:00) (90/65 - 114/68)  RR: 18 (11-09-18 @ 12:00) (9 - 28)  SpO2: 98% (11-09-18 @ 12:00) (92% - 99%)  Wt(kg): --    JVP 12  tachy, no murmurs  lungs with rales  soft nt/nd  + 1 edema    device interrogation normal  EKG: NSR, Biv pacing    A/P) She is a pleasant 64 y/o female PMH severe NICM (LVEF 10-15%) who had a [employer] Biv-ICD implanted at [facility]. A recent LHC at [facility] was also unremarkable. She now returns for with a severe decompensation of her NICM. Her device interrogation demonstrates excellent RA, RV and LV lead function. She denies palpitations nor high voltage therapy.     -supportive care as per CCU  -continue lasix and dobutamine drips  -f/u CHF consult for advanced therapies (VAD vs transplant)  -no need for AAD  -Her biv-icd was recently optimized by me. Future options including turning off BiV pacing to see if she responds better. This can be addressed when she's euvolemic  -will follow

## 2018-11-09 NOTE — PROGRESS NOTE ADULT - SUBJECTIVE AND OBJECTIVE BOX
Subjective: No acute events overnight. Feeling better today.     Medications:  acetaminophen   Tablet .. 650 milliGRAM(s) Oral every 6 hours PRN  aspirin  chewable 81 milliGRAM(s) Oral daily  buMETAnide Injectable 2 milliGRAM(s) IV Push daily  calcitriol   Capsule 0.5 MICROGram(s) Oral two times a day  calcium acetate 2001 milliGRAM(s) Oral three times a day with meals  calcium carbonate 1250 mG  + Vitamin D (OsCal 500 + D) 2 Tablet(s) Oral three times a day  cefTRIAXone   IVPB 1 Gram(s) IV Intermittent every 24 hours  chlorhexidine 4% Liquid 1 Application(s) Topical <User Schedule>  influenza   Vaccine 0.5 milliLiter(s) IntraMuscular once  insulin lispro (HumaLOG) corrective regimen sliding scale   SubCutaneous three times a day before meals  insulin lispro (HumaLOG) corrective regimen sliding scale   SubCutaneous at bedtime  levothyroxine 175 MICROGram(s) Oral daily  milrinone Infusion 0.25 MICROgram(s)/kG/Min IV Continuous <Continuous>  potassium chloride    Tablet ER 40 milliEquivalent(s) Oral every 4 hours    Vitals:  T(C): 36.8 (18 @ 06:00), Max: 36.8 (18 @ 06:00)  HR: 94 (18 @ 10:00) (92 - 102)  BP: 100/61 (18 @ 10:00) (90/65 - 114/68)  BP(mean): 72 (18 @ 10:00) (70 - 94)  RR: 22 (18 @ 10:00) (9 - 28)  SpO2: 97% (18 @ 10:00) (92% - 99%)     Daily Weight in k.2 (2018 05:00)        I&O's Summary    2018 07:  -  2018 07:00  --------------------------------------------------------  IN: 1012.5 mL / OUT: 5950 mL / NET: -4937.5 mL    2018 07:  -  2018 10:48  --------------------------------------------------------  IN: 261.9 mL / OUT: 600 mL / NET: -338.1 mL        Physical Exam:  Appearance: No Acute Distress  HEENT: JVP moderately elevated  Cardiovascular: RRR, Normal S1 S2  Respiratory: Clear to auscultation bilaterally  Gastrointestinal: Soft, Non-tender, non-distended	  Skin: no skin lesions  Neurologic: Non-focal  Extremities: 1+ LE pitting edema   Psychiatry: A & O x 3, Mood & affect appropriate      Labs:                        10.6   8.5   )-----------( 154      ( 2018 06:50 )             33.1         129<L>  |  83<L>  |  39<H>  ----------------------------<  139<H>  2.7<LL>   |  32<H>  |  1.07    Ca    6.8<L>      2018 08:50  Phos  4.6       Mg     2.1         TPro  6.4  /  Alb  2.8<L>  /  TBili  8.5<H>  /  DBili  x   /  AST  53<H>  /  ALT  24  /  AlkPhos  328<H>        PT/INR - ( 2018 06:50 )   PT: 28.2 sec;   INR: 2.39 ratio         PTT - ( 2018 04:04 )  PTT:29.2 sec      Serum Pro-Brain Natriuretic Peptide: 2414 pg/mL ( @ 04:04)        Lactate, Blood: 1.9 mmol/L ( @ 04:04)  Lactate, Blood: 2.1 mmol/L ( @ 18:43)  Lactate, Blood: 4.8 mmol/L ( @ 03:59)    TELEMETRY: Sinus Rhythm, V-Paced

## 2018-11-09 NOTE — PROGRESS NOTE ADULT - ATTENDING COMMENTS
Patient seen and examined.  Agree with above.   -Still volume overloaded  -continue with inotrope assisted diuresis  -will hold off on iabp for now as patient clinically improving    Kunal Muller MD

## 2018-11-09 NOTE — PROGRESS NOTE ADULT - ATTENDING COMMENTS
Clinically improving and seems stable on the current dose of milrinone and bolus dose diuretic.  She is already talking about leaving the hospital, which is her usual behavior.    Would continue current therapies for now.  She has been noncompliant in the past with medications and the CCU plan was to send her out on milrinone.  She is not a candidate for LVAD or transplant given her poor compliance and lack of medical follow-up.  I encouraged her to follow-up with either the [facility] or [facility] HF team and provided her with our clinic phone number.    Critical Care Time = 60 minutes

## 2018-11-09 NOTE — PROGRESS NOTE ADULT - SUBJECTIVE AND OBJECTIVE BOX
pt seen and examined, no complaints, ROS - .  tele stable , paced   no chest pain , or sob on exam,     acetaminophen   Tablet .. 650 milliGRAM(s) Oral every 6 hours PRN  aspirin  chewable 81 milliGRAM(s) Oral daily  buMETAnide Injectable 2 milliGRAM(s) IV Push daily  calcitriol   Capsule 0.5 MICROGram(s) Oral two times a day  calcium acetate 2001 milliGRAM(s) Oral three times a day with meals  calcium carbonate 1250 mG  + Vitamin D (OsCal 500 + D) 2 Tablet(s) Oral three times a day  cefTRIAXone   IVPB 1 Gram(s) IV Intermittent every 24 hours  chlorhexidine 4% Liquid 1 Application(s) Topical <User Schedule>  influenza   Vaccine 0.5 milliLiter(s) IntraMuscular once  insulin lispro (HumaLOG) corrective regimen sliding scale   SubCutaneous three times a day before meals  insulin lispro (HumaLOG) corrective regimen sliding scale   SubCutaneous at bedtime  levothyroxine 175 MICROGram(s) Oral daily  magnesium sulfate  IVPB 2 Gram(s) IV Intermittent once  milrinone Infusion 0.25 MICROgram(s)/kG/Min IV Continuous <Continuous>  nystatin/triamcinolone Cream 1 Application(s) Topical every 12 hours  potassium chloride  20 mEq/100 mL IVPB 20 milliEquivalent(s) IV Intermittent every 2 hours                            10.9   7.7   )-----------( 148      ( 08 Nov 2018 04:04 )             32.5       Hemoglobin: 10.9 g/dL (11-08 @ 04:04)  Hemoglobin: 11.1 g/dL (11-07 @ 13:01)  Hemoglobin: 11.3 g/dL (11-07 @ 03:59)  Hemoglobin: 12.3 g/dL (11-06 @ 13:24)      11-09    128<L>  |  79<L>  |  46<H>  ----------------------------<  119<H>  2.5<LL>   |  30  |  1.26    Ca    6.8<L>      09 Nov 2018 00:56  Phos  4.9     11-09  Mg     1.4     11-09    TPro  6.5  /  Alb  3.0<L>  /  TBili  9.2<H>  /  DBili  x   /  AST  55<H>  /  ALT  28  /  AlkPhos  335<H>  11-09    Creatinine Trend: 1.26<--, 1.83<--, 2.05<--, 1.98<--, 1.98<--, 2.02<--    COAGS:           T(C): 36.4 (11-08-18 @ 12:40), Max: 36.6 (11-08-18 @ 06:00)  HR: 102 (11-09-18 @ 05:00) (92 - 102)  BP: 97/52 (11-09-18 @ 05:00) (91/63 - 114/68)  RR: 16 (11-09-18 @ 05:00) (10 - 28)  SpO2: 96% (11-09-18 @ 05:00) (92% - 99%)  Wt(kg): --    I&O's Summary    07 Nov 2018 07:01  -  08 Nov 2018 07:00  --------------------------------------------------------  IN: 950.6 mL / OUT: 1815 mL / NET: -864.4 mL    08 Nov 2018 07:01  -  09 Nov 2018 05:11  --------------------------------------------------------  IN: 520.6 mL / OUT: 5950 mL / NET: -5429.4 mL        	  Lymphatic: No lymphadenopathy + pitting edema in LE bilaterally   Cardiovascular: Normal S1 S2,RRR, No murmurs , Peripheral pulses palpable 2+ bilaterally  Respiratory: Lungs clear to auscultation, normal effort 	  Gastrointestinal:  Soft, distended   Skin: No rashes, No ecchymoses, No cyanosis,       TELEMETRY:	    ECG: < from: 12 Lead ECG (11.06.18 @ 19:34) >  Atrial-sensed ventricular-paced rhythm  Biventricular pacemaker detected  ABNORMAL ECG    < end of copied text >    	  RADIOLOGY:   DIAGNOSTIC TESTING:  [ ] Echocardiogram: < from: Transthoracic Echocardiogram (08.23.18 @ 11:25) >  1. Tethered mitral valve leaflets with normal opening.  Moderate-severe mitral regurgitation.  2. Normal left ventricular internal dimensions and wall  thicknesses.  3. Severe global left ventricular systolic dysfunction.  4. Moderate right atrial enlargement.  A device wire is  noted in the right heart.  5. Right ventricular enlargement with decreased right  ventricular systolic function.  6. Normal tricuspid valve.  Severe tricuspid regurgitation.  7. Estimated pulmonary artery systolic pressure equals 24  mm Hg, assuming right atrial pressure equals 10  mm Hg,  consistent with normal pulmonary pressures.  *** Compared with echocardiogram of 6/29/2018, no  significant changes noted.    < end of copied text >    [ ]  Catheterization:  [ ] Stress Test:    OTHER: 	      ASSESSMENT/PLAN: 64 y/o F with PMHx of NICM s/p AICD, HTN, moderate-severe MR, DM, ? medication compliance who was admitted with volume overload and heart failure.    - cont inotropic assisted diuresis and bumex ( dose to be  decreased today )   - ABX per Medicine   - monitor coagulation , Am labs pending   - trend LFT,   -   GI / DVT prophylaxis,  keep K>4, mag >2.0   - heart failure input appreciated   -continue with supportive care per CCU  D/W Dr Hallman

## 2018-11-09 NOTE — PROGRESS NOTE ADULT - ASSESSMENT
The Pt is a 61 y/o woman with h/o NICM (LVEF 20%, LVIDd 6.5 cm), initially identified in 2013, s/p CRT-D (6/2017), Mod-Severe MR, Severe TR, HTN, HLD, DM II who presented to the ED with c/o vaginal bleeding in the setting of an elevated INR, which was likely the result of Congestive Hepatopathy. She has multiple findings that suggest that she is in Cardiogenic Shock complicated by UTI, and has been started on empiric inotropic support. She improved significantly with inotropic support and diuresis, but her chronic non-adherence to medical therapy and recommendations The Pt is a 61 y/o woman with h/o NICM (LVEF 20%, LVIDd 6.5 cm), initially identified in 2013, s/p CRT-D (6/2017), Mod-Severe MR, Severe TR, HTN, HLD, DM II who presented to the ED with c/o vaginal bleeding in the setting of an elevated INR, which was likely the result of Congestive Hepatopathy. She has multiple findings that suggest that she is in Cardiogenic Shock complicated by UTI, and has been started on empiric inotropic support. She improved significantly with inotropic support and diuresis, but her chronic non-adherence to medical therapy and recommendations suggest a poor long term outcome if she does not pursue close follow up.

## 2018-11-09 NOTE — PROGRESS NOTE ADULT - SUBJECTIVE AND OBJECTIVE BOX
INTERVAL HPI/OVERNIGHT EVENTS: Sitting in chair . I feel much better.   Vital Signs Last 24 Hrs  T(C): 36.8 (2018 06:00), Max: 36.8 (2018 06:00)  T(F): 98.3 (2018 06:00), Max: 98.3 (2018 06:00)  HR: 94 (2018 10:00) (92 - 102)  BP: 100/61 (2018 10:00) (90/65 - 114/68)  BP(mean): 72 (2018 10:00) (70 - 94)  RR: 22 (2018 10:00) (9 - 28)  SpO2: 97% (2018 10:00) (92% - 99%)  I&O's Summary    2018 07:01  -  2018 07:00  --------------------------------------------------------  IN: 1012.5 mL / OUT: 5950 mL / NET: -4937.5 mL    2018 07:01  -  2018 11:12  --------------------------------------------------------  IN: 261.9 mL / OUT: 600 mL / NET: -338.1 mL      MEDICATIONS  (STANDING):  aspirin  chewable 81 milliGRAM(s) Oral daily  buMETAnide Injectable 2 milliGRAM(s) IV Push daily  calcitriol   Capsule 0.5 MICROGram(s) Oral two times a day  calcium acetate 2001 milliGRAM(s) Oral three times a day with meals  calcium carbonate 1250 mG  + Vitamin D (OsCal 500 + D) 2 Tablet(s) Oral three times a day  cefTRIAXone   IVPB 1 Gram(s) IV Intermittent every 24 hours  chlorhexidine 4% Liquid 1 Application(s) Topical <User Schedule>  influenza   Vaccine 0.5 milliLiter(s) IntraMuscular once  insulin lispro (HumaLOG) corrective regimen sliding scale   SubCutaneous three times a day before meals  insulin lispro (HumaLOG) corrective regimen sliding scale   SubCutaneous at bedtime  levothyroxine 175 MICROGram(s) Oral daily  milrinone Infusion 0.25 MICROgram(s)/kG/Min (7.26 mL/Hr) IV Continuous <Continuous>  potassium chloride    Tablet ER 40 milliEquivalent(s) Oral every 4 hours    MEDICATIONS  (PRN):  acetaminophen   Tablet .. 650 milliGRAM(s) Oral every 6 hours PRN Moderate Pain (4 - 6)    LABS:                        10.6   8.5   )-----------( 154      ( 2018 06:50 )             33.1         129<L>  |  83<L>  |  39<H>  ----------------------------<  139<H>  2.7<LL>   |  32<H>  |  1.07    Ca    6.8<L>      2018 08:50  Phos  4.6       Mg     2.1         TPro  6.4  /  Alb  2.8<L>  /  TBili  8.5<H>  /  DBili  x   /  AST  53<H>  /  ALT  24  /  AlkPhos  328<H>      PT/INR - ( 2018 06:50 )   PT: 28.2 sec;   INR: 2.39 ratio         PTT - ( 2018 04:04 )  PTT:29.2 sec  Urinalysis Basic - ( 2018 15:45 )    Color: Dark Yellow / Appearance: Turbid / S.013 / pH: x  Gluc: x / Ketone: Negative  / Bili: Small / Urobili: 2 mg/dL   Blood: x / Protein: Trace / Nitrite: Negative   Leuk Esterase: Large / RBC: 10 /hpf /  /hpf   Sq Epi: x / Non Sq Epi: 1 /hpf / Bacteria: Many      CAPILLARY BLOOD GLUCOSE      POCT Blood Glucose.: 147 mg/dL (2018 08:30)  POCT Blood Glucose.: 127 mg/dL (2018 12:14)        Urinalysis Basic - ( 2018 15:45 )    Color: Dark Yellow / Appearance: Turbid / S.013 / pH: x  Gluc: x / Ketone: Negative  / Bili: Small / Urobili: 2 mg/dL   Blood: x / Protein: Trace / Nitrite: Negative   Leuk Esterase: Large / RBC: 10 /hpf /  /hpf   Sq Epi: x / Non Sq Epi: 1 /hpf / Bacteria: Many      REVIEW OF SYSTEMS:  CONSTITUTIONAL: No fever, weight loss, or fatigue  EYES: No eye pain, visual disturbances, or discharge  ENMT:  No difficulty hearing, tinnitus, vertigo; No sinus or throat pain  NECK: No pain or stiffness  BREASTS: No pain, masses, or nipple discharge  RESPIRATORY: No cough, wheezing, chills or hemoptysis; No shortness of breath  CARDIOVASCULAR: No chest pain, palpitations, dizziness,but  leg swelling  GASTROINTESTINAL: No abdominal or epigastric pain. No nausea, vomiting, or hematemesis; No diarrhea or constipation. No melena or hematochezia.      Consultant(s) Notes Reviewed:  [x ] YES  [ ] NO    PHYSICAL EXAM:  GENERAL: NAD, well-groomed, well-developed,not in any distress ,  HEAD:  Atraumatic, Normocephalic  EYES: EOMI, PERRLA, conjunctiva and sclera clear  ENMT: No tonsillar erythema, exudates, or enlargement; Moist mucous membranes, Good dentition, No lesions  NECK: Supple,+JVD, Normal thyroid  NERVOUS SYSTEM:  Alert & Oriented X3, No focal deficit   CHEST/LUNG: Good air entry bilateral with no  rales, rhonchi, wheezing, or rubs  HEART: Regular rate and rhythm; No murmurs, rubs, or gallops  ABDOMEN: Soft, Nontender, Nondistended; Bowel sounds present  EXTREMITIES:  2+ Peripheral Pulses, No clubbing, cyanosis, but ++ edema  SKIN: No rashes or lesions    Care Discussed with Consultants/Other Providers [ x] YES  [ ] NO

## 2018-11-09 NOTE — PROGRESS NOTE ADULT - ASSESSMENT
Assessment:  64yo post-menopausal F w/t a pmh of NICM, thyroid ca s/p thyroidectomy c/b hypoparathyroidism, HTN, pre-DM who presented w/t vaginal bleeding x1d and was found to be in acute on chronic decompensated HFrEF (9%) w/t a course further c/b UTI      Plan    #Neuro  AAOx4    #Cardiovascular  Cardiogenic Shock 2/2 acute on chronic decompensated HFrEF 9% c/b severe TR  -CVPs were low all day, but elevated to 20 this morning  -0.25 milrinone  -2mg IV bumex qd  -Strict Is/Os  -Lactate trending down  -ICD interrogation unremarkable  -Appreciate HF recs for IABP w/t possible bridge to palliative inotropic therapy    -Interventional cards recs: No indication for ischemic eval at this time.  -EP recs: once euvolemic, may try turning off BiV pacing to see if she responds better    Tricuspid regurgitation  -Structural Cards was consulted  -Will likely need repeat TTE/ANDER and LHC for full eval once euvolemic    #Pulmonary  Cough likely 2/2 acute CHF  -Keep head of bed above 30degrees  -O2 support PRN to maintain goal SpO2 > 90  -Robitussin    #Renal  MAGNUS on chronic CKD 2/2 prerenal azotemia 2/2 ADHF   sCr 1.26, back to baseline  -Strict I/Os  -avoid nephrotoxins    Hyponatremia likley 2/2 SIADH from ADHF   -Improving at an appropriate rate, no acute intervention as asymptomatic  -will monitor Q12 at least    Hypokalemia  -getting 60meq this morning will need more this afternoon  -Hold entresto    Hypoparathyroidism  Inappropriately normal PTH in setting of hypocalcemia  -Endocrine recs as below    Hypocalcemia  -c/w 2T CaCO3 TID w/t meals & calcitriol supp    Hyperphosphatemia  -increase phoslo to 2001mg tid w/t meals    #Infectious disease  UTI  -c/w CTX until 7/13 to complete 7 day course    #Hematology/Oncology  Elevated INR 2/2 congestive hepatopathy 2/2 CHF  -CHF Tx as above  -Additional dose of Vit K today  -INR improving    Microcytic anemia likely 2/2 vaginal bleed  -Pelvic US showing thickened endometrial stripe  -GYN recs: endometrial bx once INR improves  -May consider iron studies    #Gastrointestinal  Congestive hepatopathy 2/2 CHF, however, bili higher than expected per GI  -CHF Tx as above  -Hold statin  -May consider transjugular liver biopsy w/t pressure measurement once more stable per GI      #Endocrine  Acquired Hypoparathyroidism 2/2 thyroidectomy  -Endocrine consult    Hypocalcemia  -c/w CaCO3 & calcitriol supp    Hyperphosphatemia  -increase phoslo to 2001mg tid w/t meals      Prediabetes  -Hba1c 6.0   -FSG with meals and qhs  -SSI    #Psychiatric  No active issues    #FEN  Carb consistent DASH diet    #PPx  SCDs 2/2 elevated INR    Emmett Tesfaye MD  Internal Medicine, PGY-1  Pager [phone] Assessment:  64yo post-menopausal F w/t a pmh of HFrEF 2/2 NICM, papillary thyroid ca s/p thyroidectomy c/b hypoparathyroidism, HTN, and pre-DM who presented w/t vaginal bleeding x1d and was found to be in acute on chronic decompensated HFrEF (9%) w/t a course further c/b UTI      Plan    #Neuro  AAOx4  Diabetic neuropathy  -Gabapentin 100mg PO TID    #Cardiovascular  Cardiogenic Shock 2/2 acute on chronic decompensated HFrEF 9% c/b severe TR  -CVPs were low all day, but elevated to 20 this morning  -0.25 milrinone gtt, plan for tunneled cath vs. PICC for palliative home tx  -decrease bumex today 2mg IV   -Strict Is/Os  -Lactate trending down  -ICD interrogation unremarkable  -Appreciate HF recs: BB when euvolemic    -Interventional cards recs: No indication for ischemic eval at this time.  -EP recs: once euvolemic, may try turning off BiV pacing to see if she responds better    Tricuspid regurgitation  -Structural Cards was consulted  -Will likely need repeat TTE/ANDER and LHC for full eval once euvolemic    #Pulmonary  Cough likely 2/2 acute CHF  -Keep head of bed above 30degrees  -O2 support PRN to maintain goal SpO2 > 90  -Robitussin    #Renal  MAGNUS on chronic CKD 2/2 prerenal azotemia 2/2 ADHF - resolved  sCr 1.26, back to baseline  -Strict I/Os  -avoid nephrotoxins    Hyponatremia likley 2/2 SIADH from ADHF   -Improving at an appropriate rate, no acute intervention as asymptomatic  -will monitor Q12    Hypokalemia  -getting 60meq this morning will need more this afternoon. Recheck after 2 doses of 40meq PO  -Hold entresto    Hypoparathyroidism  Inappropriately normal PTH in setting of hypocalcemia  -Endocrine recs as below    Hypocalcemia  -c/w 2T CaCO3 TID w/t meals & calcitriol supp    Hyperphosphatemia  -increase phoslo to 2001mg tid w/t meals    #Infectious disease  UTI  -c/w CTX until 7/13 to complete 7 day course    #Hematology/Oncology  Elevated INR 2/2 congestive hepatopathy 2/2 CHF  -CHF Tx as above  -Additional dose of Vit K today  -INR improving    Microcytic anemia likely 2/2 vaginal bleed  -Pelvic US showing thickened endometrial stripe  -GYN recs: endometrial bx once INR improves  -May consider iron studies    #Gastrointestinal  Congestive hepatopathy 2/2 CHF, however, bili higher than expected per GI - all numbers improving  -CHF Tx as above  -Hold statin  -May consider transjugular liver biopsy w/t pressure measurement once more stable per GI      #Endocrine  Acquired Hypoparathyroidism 2/2 thyroidectomy  -Endocrine consult    Hypocalcemia  -c/w CaCO3 & calcitriol supp    Hyperphosphatemia  -increase phoslo to 2001mg tid w/t meals    Hypothyroidism  -TSH wnl  -Free T4 pending  -c/w levothyroxine 175mcg qd    Prediabetes  -Hba1c 6.0   -FSG with meals and qhs  -SSI    #Psychiatric  No active issues    #FEN  Carb consistent DASH diet    #PPx  SCDs 2/2 elevated INR    Emmett Tesfaye MD  Internal Medicine, PGY-1  Pager [phone]

## 2018-11-10 LAB
ALBUMIN SERPL ELPH-MCNC: 2.7 G/DL — LOW (ref 3.3–5)
ALP SERPL-CCNC: 301 U/L — HIGH (ref 40–120)
ALT FLD-CCNC: 24 U/L — SIGNIFICANT CHANGE UP (ref 10–45)
ANION GAP SERPL CALC-SCNC: 12 MMOL/L — SIGNIFICANT CHANGE UP (ref 5–17)
APTT BLD: 31.6 SEC — SIGNIFICANT CHANGE UP (ref 27.5–36.3)
AST SERPL-CCNC: 48 U/L — HIGH (ref 10–40)
BILIRUB SERPL-MCNC: 7.3 MG/DL — HIGH (ref 0.2–1.2)
BUN SERPL-MCNC: 26 MG/DL — HIGH (ref 7–23)
CALCIUM SERPL-MCNC: 7.6 MG/DL — LOW (ref 8.4–10.5)
CHLORIDE SERPL-SCNC: 86 MMOL/L — LOW (ref 96–108)
CO2 SERPL-SCNC: 35 MMOL/L — HIGH (ref 22–31)
CREAT SERPL-MCNC: 0.96 MG/DL — SIGNIFICANT CHANGE UP (ref 0.5–1.3)
GLUCOSE BLDC GLUCOMTR-MCNC: 128 MG/DL — HIGH (ref 70–99)
GLUCOSE BLDC GLUCOMTR-MCNC: 146 MG/DL — HIGH (ref 70–99)
GLUCOSE BLDC GLUCOMTR-MCNC: 195 MG/DL — HIGH (ref 70–99)
GLUCOSE BLDC GLUCOMTR-MCNC: 209 MG/DL — HIGH (ref 70–99)
GLUCOSE SERPL-MCNC: 113 MG/DL — HIGH (ref 70–99)
HCT VFR BLD CALC: 32.1 % — LOW (ref 34.5–45)
HGB BLD-MCNC: 10.3 G/DL — LOW (ref 11.5–15.5)
INR BLD: 1.97 RATIO — HIGH (ref 0.88–1.16)
MAGNESIUM SERPL-MCNC: 1.8 MG/DL — SIGNIFICANT CHANGE UP (ref 1.6–2.6)
MCHC RBC-ENTMCNC: 22.6 PG — LOW (ref 27–34)
MCHC RBC-ENTMCNC: 32.1 GM/DL — SIGNIFICANT CHANGE UP (ref 32–36)
MCV RBC AUTO: 70.5 FL — LOW (ref 80–100)
PHOSPHATE SERPL-MCNC: 3.6 MG/DL — SIGNIFICANT CHANGE UP (ref 2.5–4.5)
PLATELET # BLD AUTO: 143 K/UL — LOW (ref 150–400)
POTASSIUM SERPL-MCNC: 3.5 MMOL/L — SIGNIFICANT CHANGE UP (ref 3.5–5.3)
POTASSIUM SERPL-SCNC: 3.5 MMOL/L — SIGNIFICANT CHANGE UP (ref 3.5–5.3)
PROT SERPL-MCNC: 6.2 G/DL — SIGNIFICANT CHANGE UP (ref 6–8.3)
PROTHROM AB SERPL-ACNC: 22.5 SEC — HIGH (ref 10–13.1)
RBC # BLD: 4.55 M/UL — SIGNIFICANT CHANGE UP (ref 3.8–5.2)
RBC # FLD: 23.8 % — HIGH (ref 10.3–14.5)
SODIUM SERPL-SCNC: 133 MMOL/L — LOW (ref 135–145)
WBC # BLD: 7 K/UL — SIGNIFICANT CHANGE UP (ref 3.8–10.5)
WBC # FLD AUTO: 7 K/UL — SIGNIFICANT CHANGE UP (ref 3.8–10.5)

## 2018-11-10 RX ORDER — POTASSIUM CHLORIDE 20 MEQ
40 PACKET (EA) ORAL EVERY 4 HOURS
Qty: 0 | Refills: 0 | Status: COMPLETED | OUTPATIENT
Start: 2018-11-10 | End: 2018-11-10

## 2018-11-10 RX ADMIN — Medication 2001 MILLIGRAM(S): at 17:46

## 2018-11-10 RX ADMIN — GABAPENTIN 100 MILLIGRAM(S): 400 CAPSULE ORAL at 14:07

## 2018-11-10 RX ADMIN — Medication 200 MILLIGRAM(S): at 06:51

## 2018-11-10 RX ADMIN — Medication 2 TABLET(S): at 14:07

## 2018-11-10 RX ADMIN — Medication 175 MICROGRAM(S): at 06:49

## 2018-11-10 RX ADMIN — Medication 200 MILLIGRAM(S): at 17:47

## 2018-11-10 RX ADMIN — Medication 81 MILLIGRAM(S): at 12:26

## 2018-11-10 RX ADMIN — Medication 2: at 12:26

## 2018-11-10 RX ADMIN — GABAPENTIN 100 MILLIGRAM(S): 400 CAPSULE ORAL at 06:49

## 2018-11-10 RX ADMIN — CALCITRIOL 0.5 MICROGRAM(S): 0.5 CAPSULE ORAL at 17:46

## 2018-11-10 RX ADMIN — CALCITRIOL 0.5 MICROGRAM(S): 0.5 CAPSULE ORAL at 06:49

## 2018-11-10 RX ADMIN — BUMETANIDE 2 MILLIGRAM(S): 0.25 INJECTION INTRAMUSCULAR; INTRAVENOUS at 06:50

## 2018-11-10 RX ADMIN — Medication 2 TABLET(S): at 22:50

## 2018-11-10 RX ADMIN — Medication 40 MILLIEQUIVALENT(S): at 17:46

## 2018-11-10 RX ADMIN — Medication 2001 MILLIGRAM(S): at 12:26

## 2018-11-10 RX ADMIN — GABAPENTIN 100 MILLIGRAM(S): 400 CAPSULE ORAL at 22:50

## 2018-11-10 RX ADMIN — Medication 2001 MILLIGRAM(S): at 08:22

## 2018-11-10 RX ADMIN — Medication 2 TABLET(S): at 06:50

## 2018-11-10 RX ADMIN — Medication 40 MILLIEQUIVALENT(S): at 14:07

## 2018-11-10 NOTE — PROGRESS NOTE ADULT - ATTENDING COMMENTS
Agree with above  continue with diuresis per heart failure  no plans for cath or ischemic eval at this time    Kunal Muller MD

## 2018-11-10 NOTE — CONSULT NOTE ADULT - SUBJECTIVE AND OBJECTIVE BOX
HISTORY OF PRESENT ILLNESS: HPI:  64yo post-menopausal F w/ pmh of CHF, thyroid ca, HTN, DM, daily ASA user,  c/o vaginal bleeding that she noticed this morning. Pt states she woke up feeling fatigued with abdominal pressure and that when she sat down, she noticed that she had bled through her underwear. Pt has not experienced any bleeding episodes like this in the past. she admits to normal urinary and bowel functions and denies any HA, dizziness, SOB, CP, N/V/D, constipation. Pt has no known uterine pathologies and has not seen a GYN in 15-20 years. Currently Pt reports fatigue and daughter states she has been in this state for the past couple weeks since she was discharged from hospital for CHF exacerbation. She is now s/p course of inotrope assisted diuresis with clinical improvment       PAST MEDICAL & SURGICAL HISTORY:  Asthma  CHF (congestive heart failure): with EF of 10% s/p AICD  DM (diabetes mellitus)  HTN (hypertension)  Thyroid ca  AICD (automatic cardioverter/defibrillator) present  S/P thyroidectomy          MEDICATIONS:  MEDICATIONS  (STANDING):  aspirin  chewable 81 milliGRAM(s) Oral daily  buMETAnide Injectable 2 milliGRAM(s) IV Push daily  calcitriol   Capsule 0.5 MICROGram(s) Oral two times a day  calcium acetate 2001 milliGRAM(s) Oral three times a day with meals  calcium carbonate 1250 mG  + Vitamin D (OsCal 500 + D) 2 Tablet(s) Oral three times a day  cefpodoxime 200 milliGRAM(s) Oral every 12 hours  gabapentin 100 milliGRAM(s) Oral three times a day  influenza   Vaccine 0.5 milliLiter(s) IntraMuscular once  insulin lispro (HumaLOG) corrective regimen sliding scale   SubCutaneous three times a day before meals  insulin lispro (HumaLOG) corrective regimen sliding scale   SubCutaneous at bedtime  levothyroxine 175 MICROGram(s) Oral daily  milrinone Infusion 0.25 MICROgram(s)/kG/Min (7.26 mL/Hr) IV Continuous <Continuous>  potassium chloride    Tablet ER 40 milliEquivalent(s) Oral every 4 hours      Allergies    Isordil (Headache)  penicillin (Rash)    Intolerances        FAMILY HISTORY:  No pertinent family history in first degree relatives    Non-contributary for premature coronary disease or sudden cardiac death    SOCIAL HISTORY:    [X ] Non-smoker  [ ] Smoker  [ ] Alcohol      REVIEW OF SYSTEMS:  [ ]chest pain  [ X ]shortness of breath  [  ]palpitations  [  ]syncope  [ ]near syncope [ ]upper extremity weakness   [ ] lower extremity weakness  [  ]diplopia  [  ]altered mental status   [  ]fevers  [ ]chills [ ]nausea  [ ]vomitting  [  ]dysphagia    [ ]abdominal pain  [ ]melena  [ ]BRBPR    [  ]epistaxis  [  ]rash    [ X]lower extremity edema        [ X] All others negative	  [ ] Unable to obtain      LABS:	 	    CARDIAC MARKERS:                              10.3   7.00  )-----------( 143      ( 10 Nov 2018 08:06 )             32.1     Hb Trend: 10.3<--    11-10    133<L>  |  86<L>  |  26<H>  ----------------------------<  113<H>  3.5   |  35<H>  |  0.96    Ca    7.6<L>      10 Nov 2018 05:50  Phos  3.6     11-10  Mg     1.8     11-10    TPro  6.2  /  Alb  2.7<L>  /  TBili  7.3<H>  /  DBili  x   /  AST  48<H>  /  ALT  24  /  AlkPhos  301<H>  11-10    Creatinine Trend: 0.96<--, 0.94<--, 1.07<--, 1.26<--, 1.83<--, 2.05<--    Coags:  PT/INR - ( 10 Nov 2018 08:06 )   PT: 22.5 sec;   INR: 1.97 ratio         PTT - ( 10 Nov 2018 08:06 )  PTT:31.6 sec    PHYSICAL EXAM:  T(C): 36.6 (11-10-18 @ 11:45), Max: 36.7 (11-09-18 @ 18:23)  HR: 97 (11-10-18 @ 11:45) (88 - 103)  BP: 105/72 (11-10-18 @ 11:45) (94/66 - 110/72)  RR: 17 (11-10-18 @ 11:45) (16 - 18)  SpO2: 100% (11-10-18 @ 11:45) (95% - 100%)  Wt(kg): --  I&O's Summary    09 Nov 2018 07:01  -  10 Nov 2018 07:00  --------------------------------------------------------  IN: 595.7 mL / OUT: 1400 mL / NET: -804.3 mL    10 Nov 2018 07:01  -  10 Nov 2018 14:31  --------------------------------------------------------  IN: 540 mL / OUT: 2750 mL / NET: -2210 mL        Gen: Appears well in NAD  HEENT:  (-)icterus (-)pallor  CV: N S1 S2 1/6 ROSALINDA (+)2 Pulses B/l  Resp:  CLear B/L  normal effort  GI: (+) BS Soft, NT, ND  Lymph:  (+)Edema, (-)obvious lymphadenopathy  Skin: Warm to touch, Normal turgor  Psych: Appropriate mood and affect        TELEMETRY: 	  Sinus PVC    ECG:  	Sinus V paced    RADIOLOGY:         CXR:  No infiltrate      62 y/o F with PMHx of NICM s/p AICD, HTN, moderate-severe MR, DM, ? medication compliance who was admitted with volume overload and heart failure.      - Cont to keep net negative  - Off Bumex  - Cont to hold (-) inotrope  - Maintain strict I+O's  - Consultants appreciated    Augusto Grimes MD, [facility] HISTORY OF PRESENT ILLNESS: HPI:  64yo post-menopausal F w/ pmh of CHF, thyroid ca, HTN, DM, daily ASA user,  c/o vaginal bleeding that she noticed this morning. Pt states she woke up feeling fatigued with abdominal pressure and that when she sat down, she noticed that she had bled through her underwear. Pt has not experienced any bleeding episodes like this in the past. she admits to normal urinary and bowel functions and denies any HA, dizziness, SOB, CP, N/V/D, constipation. Pt has no known uterine pathologies and has not seen a GYN in 15-20 years. Currently Pt reports fatigue and daughter states she has been in this state for the past couple weeks since she was discharged from hospital for CHF exacerbation. She is now s/p course of inotrope assisted diuresis with clinical improvment       PAST MEDICAL & SURGICAL HISTORY:  Asthma  CHF (congestive heart failure): with EF of 10% s/p AICD  DM (diabetes mellitus)  HTN (hypertension)  Thyroid ca  AICD (automatic cardioverter/defibrillator) present  S/P thyroidectomy          MEDICATIONS:  MEDICATIONS  (STANDING):  aspirin  chewable 81 milliGRAM(s) Oral daily  buMETAnide Injectable 2 milliGRAM(s) IV Push daily  calcitriol   Capsule 0.5 MICROGram(s) Oral two times a day  calcium acetate 2001 milliGRAM(s) Oral three times a day with meals  calcium carbonate 1250 mG  + Vitamin D (OsCal 500 + D) 2 Tablet(s) Oral three times a day  cefpodoxime 200 milliGRAM(s) Oral every 12 hours  gabapentin 100 milliGRAM(s) Oral three times a day  influenza   Vaccine 0.5 milliLiter(s) IntraMuscular once  insulin lispro (HumaLOG) corrective regimen sliding scale   SubCutaneous three times a day before meals  insulin lispro (HumaLOG) corrective regimen sliding scale   SubCutaneous at bedtime  levothyroxine 175 MICROGram(s) Oral daily  milrinone Infusion 0.25 MICROgram(s)/kG/Min (7.26 mL/Hr) IV Continuous <Continuous>  potassium chloride    Tablet ER 40 milliEquivalent(s) Oral every 4 hours      Allergies    Isordil (Headache)  penicillin (Rash)    Intolerances        FAMILY HISTORY:  No pertinent family history in first degree relatives    Non-contributary for premature coronary disease or sudden cardiac death    SOCIAL HISTORY:    [X ] Non-smoker  [ ] Smoker  [ ] Alcohol      REVIEW OF SYSTEMS:  [ ]chest pain  [ X ]shortness of breath  [  ]palpitations  [  ]syncope  [ ]near syncope [ ]upper extremity weakness   [ ] lower extremity weakness  [  ]diplopia  [  ]altered mental status   [  ]fevers  [ ]chills [ ]nausea  [ ]vomitting  [  ]dysphagia    [ ]abdominal pain  [ ]melena  [ ]BRBPR    [  ]epistaxis  [  ]rash    [ X]lower extremity edema        [ X] All others negative	  [ ] Unable to obtain      LABS:	 	    CARDIAC MARKERS:                              10.3   7.00  )-----------( 143      ( 10 Nov 2018 08:06 )             32.1     Hb Trend: 10.3<--    11-10    133<L>  |  86<L>  |  26<H>  ----------------------------<  113<H>  3.5   |  35<H>  |  0.96    Ca    7.6<L>      10 Nov 2018 05:50  Phos  3.6     11-10  Mg     1.8     11-10    TPro  6.2  /  Alb  2.7<L>  /  TBili  7.3<H>  /  DBili  x   /  AST  48<H>  /  ALT  24  /  AlkPhos  301<H>  11-10    Creatinine Trend: 0.96<--, 0.94<--, 1.07<--, 1.26<--, 1.83<--, 2.05<--    Coags:  PT/INR - ( 10 Nov 2018 08:06 )   PT: 22.5 sec;   INR: 1.97 ratio         PTT - ( 10 Nov 2018 08:06 )  PTT:31.6 sec    PHYSICAL EXAM:  T(C): 36.6 (11-10-18 @ 11:45), Max: 36.7 (11-09-18 @ 18:23)  HR: 97 (11-10-18 @ 11:45) (88 - 103)  BP: 105/72 (11-10-18 @ 11:45) (94/66 - 110/72)  RR: 17 (11-10-18 @ 11:45) (16 - 18)  SpO2: 100% (11-10-18 @ 11:45) (95% - 100%)  Wt(kg): --  I&O's Summary    09 Nov 2018 07:01  -  10 Nov 2018 07:00  --------------------------------------------------------  IN: 595.7 mL / OUT: 1400 mL / NET: -804.3 mL    10 Nov 2018 07:01  -  10 Nov 2018 14:31  --------------------------------------------------------  IN: 540 mL / OUT: 2750 mL / NET: -2210 mL        Gen: Appears well in NAD  HEENT:  (-)icterus (-)pallor  CV: N S1 S2 1/6 ROSALINDA (+)2 Pulses B/l  Resp:  CLear B/L  normal effort  GI: (+) BS Soft, NT, ND  Lymph:  (+)Edema, (-)obvious lymphadenopathy  Skin: Warm to touch, Normal turgor  Psych: Appropriate mood and affect        TELEMETRY: 	  Sinus PVC    ECG:  	Sinus V paced    RADIOLOGY:         CXR:  No infiltrate      64 y/o F with PMHx of NICM s/p AICD, HTN, moderate-severe MR, DM, ? medication compliance who was admitted with volume overload and heart failure.      - Cont to keep net negative  - OnBumex  - Cont to hold (-) inotrope  - Maintain strict I+O's  - Consultants appreciated    Augusto Grimes MD, [facility]

## 2018-11-10 NOTE — PROGRESS NOTE ADULT - ASSESSMENT
62yo post-menopausal F w/ pmh of CHF, thyroid ca, HTN, DM, daily ASA user,  c/o vaginal bleeding that she noticed this morning. Pt states she woke up feeling fatigued with abdominal pressure and that when she sat down, she noticed that she had bled through her underwear. Pt has not experienced any bleeding episodes like this in the past. she admits to normal urinary and bowel functions and denies any HA, dizziness, SOB, CP, N/V/D, constipation. Pt has no known uterine pathologies and has not seen a GYN in 15-20 years. Currently Pt reports fatigue and daughter states she has been in this state for the past couple weeks since she was discharged from hospital for CHF exacerbation.      Problem/Plan - 1:  ·  Problem: Acute on chronic systolic congestive heart failure.  Plan: Improving. IV Bumex and Dobutamine  . Cardiology and CHF team helping. Awaiting PICC Line,.     Problem/Plan - 2:  ·  Problem: Acute renal failure.  Plan: Creatinine better.  Renal following .     Problem/Plan - 3:  ·  Problem: Hyperkalemia/ Hypokalemia .  Plan: .Correcting.      Problem/Plan - 4:  ·  Problem: Hyponatremia.  Plan: Still low. Management per renal.      Problem/Plan - 5:  ·  Problem:  Hypocalcemia.  Plan: Chronic . Replacing IV and PO ..      Problem/Plan - 6:  Problem:  Jaundice, hepatocellular. Plan: Likely from CHF . Monitoring LFT      Problem/Plan - 7:  ·  Problem: DM (diabetes mellitus).  Plan: SSI and Lantus .Sugars in good control.      Problem/Plan - 8:  ·  Problem: UTI .  Plan: IV Abxs     Problem/Plan - 9:  ·  Problem: Hypothyroidism.  Plan: Synthroid home dose and will check TFT in am.      Problem/Plan - 10:  Problem: Vaginal bleeding. Plan; GYn consult consulted and awaiting Endometrial BX.       Problem/Plan - 11:  ·  Problem: Coagulopathy .  Plan: Sec to Liver injury. INR better. Holding DVT prophylaxis as vaginal bleeding.       Problem/Plan - 12:  ·  Problem: Diabetic Neuropathy  .  Plan: responding to Neurontin. Will check B12 and Folate as well TFT.

## 2018-11-10 NOTE — PROGRESS NOTE ADULT - SUBJECTIVE AND OBJECTIVE BOX
Sub: Patient seen and examined at the bedside. Denies complaints. Sitting in chair comfortably. Daughter at the bedside.  Patient with no anginal chest pain or shortness of breath.    ROS otherwise negative.        MEDICATIONS  (STANDING):  aspirin  chewable 81 milliGRAM(s) Oral daily  buMETAnide Injectable 2 milliGRAM(s) IV Push daily  calcitriol   Capsule 0.5 MICROGram(s) Oral two times a day  calcium acetate 2001 milliGRAM(s) Oral three times a day with meals  calcium carbonate 1250 mG  + Vitamin D (OsCal 500 + D) 2 Tablet(s) Oral three times a day  cefpodoxime 200 milliGRAM(s) Oral every 12 hours  gabapentin 100 milliGRAM(s) Oral three times a day  influenza   Vaccine 0.5 milliLiter(s) IntraMuscular once  insulin lispro (HumaLOG) corrective regimen sliding scale   SubCutaneous three times a day before meals  insulin lispro (HumaLOG) corrective regimen sliding scale   SubCutaneous at bedtime  levothyroxine 175 MICROGram(s) Oral daily  milrinone Infusion 0.25 MICROgram(s)/kG/Min (7.26 mL/Hr) IV Continuous <Continuous>  potassium chloride    Tablet ER 40 milliEquivalent(s) Oral every 4 hours    MEDICATIONS  (PRN):      LABS:                        10.3   7.00  )-----------( 143      ( 10 Nov 2018 08:06 )             32.1     Hemoglobin: 10.3 g/dL (11-10 @ 08:06)  Hemoglobin: 10.6 g/dL (11-09 @ 06:50)  Hemoglobin: 10.9 g/dL (11-08 @ 04:04)  Hemoglobin: 11.1 g/dL (11-07 @ 13:01)  Hemoglobin: 11.3 g/dL (11-07 @ 03:59)    11-10    133<L>  |  86<L>  |  26<H>  ----------------------------<  113<H>  3.5   |  35<H>  |  0.96    Ca    7.6<L>      10 Nov 2018 05:50  Phos  3.6     11-10  Mg     1.8     11-10    TPro  6.2  /  Alb  2.7<L>  /  TBili  7.3<H>  /  DBili  x   /  AST  48<H>  /  ALT  24  /  AlkPhos  301<H>  11-10    Creatinine Trend: 0.96<--, 0.94<--, 1.07<--, 1.26<--, 1.83<--, 2.05<--   PT/INR - ( 10 Nov 2018 08:06 )   PT: 22.5 sec;   INR: 1.97 ratio         PTT - ( 10 Nov 2018 08:06 )  PTT:31.6 sec        PHYSICAL EXAM  Vital Signs Last 24 Hrs  T(C): 36.6 (10 Nov 2018 11:45), Max: 36.7 (09 Nov 2018 18:23)  T(F): 97.8 (10 Nov 2018 11:45), Max: 98.1 (10 Nov 2018 04:08)  HR: 97 (10 Nov 2018 11:45) (88 - 103)  BP: 105/72 (10 Nov 2018 11:45) (94/66 - 110/72)  BP(mean): 78 (09 Nov 2018 16:00) (77 - 78)  RR: 17 (10 Nov 2018 11:45) (16 - 18)  SpO2: 100% (10 Nov 2018 11:45) (95% - 100%)    	  Lymphatic: No lymphadenopathy + pitting edema in LE bilaterally   Cardiovascular: Normal S1 S2,RRR, No murmurs , Peripheral pulses palpable 2+ bilaterally  Respiratory: Lungs clear to auscultation, normal effort 	  Gastrointestinal:  Soft, distended   Skin: No rashes, No ecchymoses, No cyanosis,       TELEMETRY: 	    ECG: < from: 12 Lead ECG (11.06.18 @ 19:34) >  Atrial-sensed ventricular-paced rhythm  Biventricular pacemaker detected  ABNORMAL ECG    < end of copied text >    	  RADIOLOGY:     < from: Xray Chest 1 View- PORTABLE-Routine (11.08.18 @ 08:51) >    Impression:    The heart is enlarged. Small left pleural effusion. The right lung is   clear. A pacer is in good position. A central line is seen on the right   and the tip is in superior vena cava. No pneumothorax.    < end of copied text >        DIAGNOSTIC TESTING:    < from: TTE with Doppler (w/Cont) (11.07.18 @ 05:53) >  ------------------------------------------------------------------------  Conclusions:  1. Tethered mitral valve leaflets with normal opening.  Mitral annular calcification and thickened  mitral leaflet  tips Moderate mitral regurgitation.  2. Calcified trileaflet aortic valve with normal opening.  3. Moderately dilated left atrium.  LA volume index = 43  cc/m2.  4. Eccentric left ventricular hypertrophy (dilated left  ventricle with normal relative wall thickness).  5. Severe global left ventricularsystolic dysfunction.  Endocardial visualization enhanced with intravenous  injection of Ultrasonic Enhancing Agent (Definity). No LV  thrombus.  Septal flattening consistent with right  ventricular overload.  6. Severe diastolic dysfunction with elevated filling  pressures  7. Severe right atrial enlargement.  8. Right ventricular enlargement with decreased right  ventricular systolic function.  A device wire is noted in  the right heart.  9. Dilated tricuspid annulus with malcoaptation of  tricuspid leaflets. Severe tricuspid regurgitation.    < end of copied text >        [ ] Echocardiogram: < from: Transthoracic Echocardiogram (08.23.18 @ 11:25) >  1. Tethered mitral valve leaflets with normal opening.  Moderate-severe mitral regurgitation.  2. Normal left ventricular internal dimensions and wall  thicknesses.  3. Severe global left ventricular systolic dysfunction.  4. Moderate right atrial enlargement.  A device wire is  noted in the right heart.  5. Right ventricular enlargement with decreased right  ventricular systolic function.  6. Normal tricuspid valve.  Severe tricuspid regurgitation.  7. Estimated pulmonary artery systolic pressure equals 24  mm Hg, assuming right atrial pressure equals 10  mm Hg,  consistent with normal pulmonary pressures.  *** Compared with echocardiogram of 6/29/2018, no  significant changes noted.    < end of copied text >    [ ]  Catheterization:  [ ] Stress Test:    OTHER: 	  ICD check 11/7/18   · Additional Procedure Details	call to interrogate device to check arrhythmias normal sensing and pacing thresholds , stable lead impedances AT AF burden <0.1 % ,optivol fluid index below impedence line indicating no fluid accumulation No stored data for review	  · Comments	3.8 years battery life	  · Underlying Rhythm	SR 80's, not pacemaker dependent	  · % Bi-V Paced	100%	  · Battery	Good	        ASSESSMENT/PLAN: 62 y/o F with PMHx of NICM s/p AICD, HTN, moderate-severe MR, DM, ? medication compliance who was admitted with volume overload and heart failure.      -- Patient hemodynamically stable   -- Continue to maintain net negative.  -- TTE reveals severe LV dysfunction with moderate- severe MR and severe TR     - structural heart noted - re-evaulate for possible raul clip when euvolemic   -lactate/LFTS/INR downtrending   -no urgent ischemic evaluation needed at this time  -ICD interrogation with NSR    - renal recs noted ( 1 liter free water, Primacor/Bumex per cardiology)  -appreciate GYN consultation - eventual endometrial bx  -heart failure recs noted -   "RHC with IABP for MCS as bridge to possibly palliative inotropic therapy"                       - at this time given patient's history of non compliance, she is a poor candidate for advanced therapies/transplant                       -f/u pending       Adele Miller PA-C

## 2018-11-10 NOTE — PROGRESS NOTE ADULT - SUBJECTIVE AND OBJECTIVE BOX
GASTROENTEROLOGY    Patient seen and examined at bedside  complains that she is still having some vaginal bleeding      MEDICATIONS  (STANDING):  aspirin  chewable 81 milliGRAM(s) Oral daily  buMETAnide Injectable 2 milliGRAM(s) IV Push daily  calcitriol   Capsule 0.5 MICROGram(s) Oral two times a day  calcium acetate 2001 milliGRAM(s) Oral three times a day with meals  calcium carbonate 1250 mG  + Vitamin D (OsCal 500 + D) 2 Tablet(s) Oral three times a day  cefpodoxime 200 milliGRAM(s) Oral every 12 hours  gabapentin 100 milliGRAM(s) Oral three times a day  influenza   Vaccine 0.5 milliLiter(s) IntraMuscular once  insulin lispro (HumaLOG) corrective regimen sliding scale   SubCutaneous three times a day before meals  insulin lispro (HumaLOG) corrective regimen sliding scale   SubCutaneous at bedtime  levothyroxine 175 MICROGram(s) Oral daily  milrinone Infusion 0.25 MICROgram(s)/kG/Min (7.26 mL/Hr) IV Continuous <Continuous>  potassium chloride    Tablet ER 40 milliEquivalent(s) Oral every 4 hours        T(F): 97.8 (11-10-18 @ 11:45), Max: 98.1 (11-10-18 @ 04:08)  HR: 97 (11-10-18 @ 11:45) (88 - 103)  BP: 105/72 (11-10-18 @ 11:45) (94/66 - 110/72)  RR: 17 (11-10-18 @ 11:45) (16 - 18)  SpO2: 100% (11-10-18 @ 11:45) (95% - 100%)  Wt(kg): --    PHYSICAL EXAM  GENERAL:   NAD  HEENT:  NC/AT, scleral icterus  CHEST:  respirations nonlabored  HEART:  +S1+S2 regular rate and rhythm   ABDOMEN:  Soft, non-tender, + BS  EXTREMITIES:  + edema  NEURO:  Alert, oriented  SKIN:  No rash/warm/dry      LABS:                        10.3<L>  7.00  )-----------( 143<L>    ( 10 Nov 2018 08:06 )             32.1<L>  10 Nov 2018 08:06    11-10    133<L>  |  86<L>  |  26<H>  ----------------------------<  113<H>  3.5   |  35<H>  |  0.96    Ca    7.6<L>      10 Nov 2018 05:50  Phos  3.6     11-10  Mg     1.8     11-10    TPro  6.2  /  Alb  2.7<L>  /  TBili  7.3<H>  /  DBili  x   /  AST  48<H>  /  ALT  24  /  AlkPhos  301<H>  11-10    LIVER FUNCTIONS - ( 10 Nov 2018 05:50 )  Alb: 2.7 g/dL / Pro: 6.2 g/dL / ALK PHOS: 301 U/L / ALT: 24 U/L / AST: 48 U/L / GGT: x           PT/INR - ( 10 Nov 2018 08:06 )   PT: 22.5 sec;   INR: 1.97 ratio         PTT - ( 10 Nov 2018 08:06 )  PTT:31.6 sec  I&O's Detail    09 Nov 2018 07:01  -  10 Nov 2018 07:00  --------------------------------------------------------  IN:    IV PiggyBack: 50 mL    milrinone  Infusion: 65.7 mL    Oral Fluid: 480 mL  Total IN: 595.7 mL    OUT:    Voided: 1400 mL  Total OUT: 1400 mL    Total NET: -804.3 mL      10 Nov 2018 07:01  -  10 Nov 2018 13:28  --------------------------------------------------------  IN:    Oral Fluid: 240 mL  Total IN: 240 mL    OUT:    Voided: 1450 mL  Total OUT: 1450 mL    Total NET: -1210 mL

## 2018-11-10 NOTE — PROGRESS NOTE ADULT - SUBJECTIVE AND OBJECTIVE BOX
pt seen and examined, no complaints, ROS - .    aspirin  chewable 81 milliGRAM(s) Oral daily  buMETAnide Injectable 2 milliGRAM(s) IV Push daily  calcitriol   Capsule 0.5 MICROGram(s) Oral two times a day  calcium acetate 2001 milliGRAM(s) Oral three times a day with meals  calcium carbonate 1250 mG  + Vitamin D (OsCal 500 + D) 2 Tablet(s) Oral three times a day  cefpodoxime 200 milliGRAM(s) Oral every 12 hours  gabapentin 100 milliGRAM(s) Oral three times a day  influenza   Vaccine 0.5 milliLiter(s) IntraMuscular once  insulin lispro (HumaLOG) corrective regimen sliding scale   SubCutaneous three times a day before meals  insulin lispro (HumaLOG) corrective regimen sliding scale   SubCutaneous at bedtime  levothyroxine 175 MICROGram(s) Oral daily  milrinone Infusion 0.25 MICROgram(s)/kG/Min IV Continuous <Continuous>                            10.6   8.5   )-----------( 154      ( 09 Nov 2018 06:50 )             33.1       Hemoglobin: 10.6 g/dL (11-09 @ 06:50)  Hemoglobin: 10.9 g/dL (11-08 @ 04:04)  Hemoglobin: 11.1 g/dL (11-07 @ 13:01)  Hemoglobin: 11.3 g/dL (11-07 @ 03:59)  Hemoglobin: 12.3 g/dL (11-06 @ 13:24)      11-09    131<L>  |  83<L>  |  32<H>  ----------------------------<  176<H>  2.7<LL>   |  34<H>  |  0.94    Ca    6.7<L>      09 Nov 2018 14:37  Phos  3.8     11-09  Mg     1.7     11-09    TPro  6.2  /  Alb  2.7<L>  /  TBili  8.3<H>  /  DBili  x   /  AST  54<H>  /  ALT  25  /  AlkPhos  320<H>  11-09    Creatinine Trend: 0.94<--, 1.07<--, 1.26<--, 1.83<--, 2.05<--, 1.98<--    COAGS: PT/INR - ( 09 Nov 2018 14:47 )   PT: 29.4 sec;   INR: 2.51 ratio         PTT - ( 09 Nov 2018 14:47 )  PTT:31.5 sec          T(C): 36.7 (11-10-18 @ 04:08), Max: 36.7 (11-09-18 @ 18:23)  HR: 103 (11-10-18 @ 04:08) (88 - 103)  BP: 105/70 (11-10-18 @ 04:08) (88/69 - 110/72)  RR: 18 (11-10-18 @ 04:08) (9 - 22)  SpO2: 98% (11-10-18 @ 04:08) (94% - 99%)  Wt(kg): --    I&O's Summary    08 Nov 2018 07:01  -  09 Nov 2018 07:00  --------------------------------------------------------  IN: 1012.5 mL / OUT: 5950 mL / NET: -4937.5 mL    09 Nov 2018 07:01  -  10 Nov 2018 06:20  --------------------------------------------------------  IN: 595.7 mL / OUT: 1400 mL / NET: -804.3 mL    	  Lymphatic: No lymphadenopathy + pitting edema in LE bilaterally   Cardiovascular: Normal S1 S2,RRR, No murmurs , Peripheral pulses palpable 2+ bilaterally  Respiratory: Lungs clear to auscultation, normal effort 	  Gastrointestinal:  Soft, distended   Skin: No rashes, No ecchymoses, No cyanosis,       TELEMETRY:	    ECG: < from: 12 Lead ECG (11.06.18 @ 19:34) >  Atrial-sensed ventricular-paced rhythm  Biventricular pacemaker detected  ABNORMAL ECG    < end of copied text >    	  RADIOLOGY:   DIAGNOSTIC TESTING:  [ ] Echocardiogram: < from: Transthoracic Echocardiogram (08.23.18 @ 11:25) >  1. Tethered mitral valve leaflets with normal opening.  Moderate-severe mitral regurgitation.  2. Normal left ventricular internal dimensions and wall  thicknesses.  3. Severe global left ventricular systolic dysfunction.  4. Moderate right atrial enlargement.  A device wire is  noted in the right heart.  5. Right ventricular enlargement with decreased right  ventricular systolic function.  6. Normal tricuspid valve.  Severe tricuspid regurgitation.  7. Estimated pulmonary artery systolic pressure equals 24  mm Hg, assuming right atrial pressure equals 10  mm Hg,  consistent with normal pulmonary pressures.  *** Compared with echocardiogram of 6/29/2018, no  significant changes noted.    < end of copied text >    [ ]  Catheterization:  [ ] Stress Test:    OTHER: 	      ASSESSMENT/PLAN: 64 y/o F with PMHx of NICM s/p AICD, HTN, moderate-severe MR, DM, ? medication compliance who was admitted with volume overload and heart failure.    - cont inotropic assisted diuresis and bumex   - ABX per Medicine   - monitor coagulation , Am labs pending   -   GI / DVT prophylaxis,  keep K>4, mag >2.0   - heart failure follow up   D/W Dr Hallman

## 2018-11-10 NOTE — PROGRESS NOTE ADULT - PROBLEM SELECTOR PLAN 1
suspect congestive hepatopathy but uncertain  prior imaging not impressive  serologic workup negative  LFT's continue to trend downward  complete workup would include transjugular liver biopsy with pressure measurement  Monitor LFT's daily

## 2018-11-10 NOTE — PROGRESS NOTE ADULT - SUBJECTIVE AND OBJECTIVE BOX
NEPHROLOGY - NSN    Patient seen and examined.    MEDICATIONS  (STANDING):  aspirin  chewable 81 milliGRAM(s) Oral daily  buMETAnide Injectable 2 milliGRAM(s) IV Push daily  calcitriol   Capsule 0.5 MICROGram(s) Oral two times a day  calcium acetate 2001 milliGRAM(s) Oral three times a day with meals  calcium carbonate 1250 mG  + Vitamin D (OsCal 500 + D) 2 Tablet(s) Oral three times a day  cefpodoxime 200 milliGRAM(s) Oral every 12 hours  gabapentin 100 milliGRAM(s) Oral three times a day  influenza   Vaccine 0.5 milliLiter(s) IntraMuscular once  insulin lispro (HumaLOG) corrective regimen sliding scale   SubCutaneous three times a day before meals  insulin lispro (HumaLOG) corrective regimen sliding scale   SubCutaneous at bedtime  levothyroxine 175 MICROGram(s) Oral daily  milrinone Infusion 0.25 MICROgram(s)/kG/Min (7.26 mL/Hr) IV Continuous <Continuous>    VITALS:  T(C): , Max: 36.7 (11-09-18 @ 18:23)  T(F): , Max: 98.1 (11-10-18 @ 04:08)  HR: 103 (11-10-18 @ 04:08)  BP: 105/70 (11-10-18 @ 04:08)  BP(mean): 78 (11-09-18 @ 16:00)  RR: 18 (11-10-18 @ 04:08)  SpO2: 98% (11-10-18 @ 04:08)  Wt(kg): --  I and O's:    11-09 @ 07:01  -  11-10 @ 07:00  --------------------------------------------------------  IN: 595.7 mL / OUT: 1400 mL / NET: -804.3 mL          REVIEW OF SYSTEMS:  Full ROS done and were negative unless otherwise indicated in HPI/assessment.     PHYSICAL EXAM:  Constitutional: NAD  Respiratory: CTA B/L  Cardiovascular: S1 and S2, RRR  Gastrointestinal: + BS, soft, NT, ND  Extremities: No peripheral edema  Neurological: AAO x 3, CN 2-12 intact  : No Gustafson  Access: Not applicable    LABS:                        10.3   7.00  )-----------( 143      ( 10 Nov 2018 08:06 )             32.1     11-10    133<L>  |  86<L>  |  26<H>  ----------------------------<  113<H>  3.5   |  35<H>  |  0.96    Ca    7.6<L>      10 Nov 2018 05:50  Phos  3.6     11-10  Mg     1.8     11-10    TPro  6.2  /  Alb  2.7<L>  /  TBili  7.3<H>  /  DBili  x   /  AST  48<H>  /  ALT  24  /  AlkPhos  301<H>  11-10      Urine Studies:        RADIOLOGY & ADDITIONAL STUDIES:      ASSESSMENT/RECOMMEND    Nannette Rice NP  [facility]  [phone] NEPHROLOGY - NSN    Patient seen and examined.  Still spotting (vaginal bleeding)  Transferred out of CCU     MEDICATIONS  (STANDING):  aspirin  chewable 81 milliGRAM(s) Oral daily  buMETAnide Injectable 2 milliGRAM(s) IV Push daily  calcitriol   Capsule 0.5 MICROGram(s) Oral two times a day  calcium acetate 2001 milliGRAM(s) Oral three times a day with meals  calcium carbonate 1250 mG  + Vitamin D (OsCal 500 + D) 2 Tablet(s) Oral three times a day  cefpodoxime 200 milliGRAM(s) Oral every 12 hours  gabapentin 100 milliGRAM(s) Oral three times a day  influenza   Vaccine 0.5 milliLiter(s) IntraMuscular once  insulin lispro (HumaLOG) corrective regimen sliding scale   SubCutaneous three times a day before meals  insulin lispro (HumaLOG) corrective regimen sliding scale   SubCutaneous at bedtime  levothyroxine 175 MICROGram(s) Oral daily  milrinone Infusion 0.25 MICROgram(s)/kG/Min (7.26 mL/Hr) IV Continuous <Continuous>    VITALS:  T(C): , Max: 36.7 (11-09-18 @ 18:23)  T(F): , Max: 98.1 (11-10-18 @ 04:08)  HR: 103 (11-10-18 @ 04:08)  BP: 105/70 (11-10-18 @ 04:08)  BP(mean): 78 (11-09-18 @ 16:00)  RR: 18 (11-10-18 @ 04:08)  SpO2: 98% (11-10-18 @ 04:08)  Wt(kg): --  I and O's:    11-09 @ 07:01  -  11-10 @ 07:00  --------------------------------------------------------  IN: 595.7 mL / OUT: 1400 mL / NET: -804.3 mL          REVIEW OF SYSTEMS:  Full ROS done and were negative unless otherwise indicated in HPI/assessment.     PHYSICAL EXAM:  Constitutional: NAD  Respiratory: CTA B/L  Cardiovascular: S1 and S2, RRR  Gastrointestinal: + BS, soft, NT, ND  Extremities: + edema  Neurological: AAO x 3, CN 2-12 intact  : No Gustafson  Access: Not applicable    LABS:                        10.3   7.00  )-----------( 143      ( 10 Nov 2018 08:06 )             32.1     11-10    133<L>  |  86<L>  |  26<H>  ----------------------------<  113<H>  3.5   |  35<H>  |  0.96    Ca    7.6<L>      10 Nov 2018 05:50  Phos  3.6     11-10  Mg     1.8     11-10    TPro  6.2  /  Alb  2.7<L>  /  TBili  7.3<H>  /  DBili  x   /  AST  48<H>  /  ALT  24  /  AlkPhos  301<H>  11-10      Urine Studies:        RADIOLOGY & ADDITIONAL STUDIES:      ASSESSMENT  63F w/ HTN, DM2, thyroid CA s/p resection, acquired hypoparathyroidism, and severe HFrEF, 11/6/18 a/w vaginal bleeding/MAGNUS/electrolyte derangements/ acute on chronic HFrEF    (1)Renal - MAGNUS - Prerenally mediated in setting of decompensated CHF. Much improved today -due to improved cardiac function induced by Primacor    (2)Hypokalemia - due to diuresis, improving    (3)Hypomagnesemia - due to diuresis, improving    (4)Bone - hypocalcemia/hyperphosphatemia due to hypoparathyroidism (s/p iatrogenic parathyroidectomy). Very chronic. Calcium slowly improving    (5)Hyponatremia - due to decompensated CHF. Slowly improving.    (6)CV - slowly improving, with Primacor-assisted diuresis      RECOMMEND:  (1)Primacor + Bumex gtts as ordered per cardiology  (2)Oscal/Phoslo/Calcitriol as ordered  (3)1 Liter free water restriction  (4)K+ repletion - give KCL 40 meq po x 2 doses today   (5)daily BMP/Mg/Phos  (7)Dose new meds for GFR 40-50ml/min (present dosing is acceptable)    Nannette Rice NP  [facility]  [phone]

## 2018-11-10 NOTE — PROGRESS NOTE ADULT - SUBJECTIVE AND OBJECTIVE BOX
INTERVAL HPI/OVERNIGHT EVENTS: had spotting last night   Vital Signs Last 24 Hrs  T(C): 36.6 (10 Nov 2018 11:45), Max: 36.7 (09 Nov 2018 18:23)  T(F): 97.8 (10 Nov 2018 11:45), Max: 98.1 (10 Nov 2018 04:08)  HR: 97 (10 Nov 2018 11:45) (88 - 103)  BP: 105/72 (10 Nov 2018 11:45) (94/66 - 110/72)  BP(mean): 78 (09 Nov 2018 16:00) (73 - 78)  RR: 17 (10 Nov 2018 11:45) (16 - 18)  SpO2: 100% (10 Nov 2018 11:45) (95% - 100%)  I&O's Summary    09 Nov 2018 07:01  -  10 Nov 2018 07:00  --------------------------------------------------------  IN: 595.7 mL / OUT: 1400 mL / NET: -804.3 mL    10 Nov 2018 07:01  -  10 Nov 2018 13:04  --------------------------------------------------------  IN: 240 mL / OUT: 1450 mL / NET: -1210 mL      MEDICATIONS  (STANDING):  aspirin  chewable 81 milliGRAM(s) Oral daily  buMETAnide Injectable 2 milliGRAM(s) IV Push daily  calcitriol   Capsule 0.5 MICROGram(s) Oral two times a day  calcium acetate 2001 milliGRAM(s) Oral three times a day with meals  calcium carbonate 1250 mG  + Vitamin D (OsCal 500 + D) 2 Tablet(s) Oral three times a day  cefpodoxime 200 milliGRAM(s) Oral every 12 hours  gabapentin 100 milliGRAM(s) Oral three times a day  influenza   Vaccine 0.5 milliLiter(s) IntraMuscular once  insulin lispro (HumaLOG) corrective regimen sliding scale   SubCutaneous three times a day before meals  insulin lispro (HumaLOG) corrective regimen sliding scale   SubCutaneous at bedtime  levothyroxine 175 MICROGram(s) Oral daily  milrinone Infusion 0.25 MICROgram(s)/kG/Min (7.26 mL/Hr) IV Continuous <Continuous>    MEDICATIONS  (PRN):    LABS:                        10.3   7.00  )-----------( 143      ( 10 Nov 2018 08:06 )             32.1     11-10    133<L>  |  86<L>  |  26<H>  ----------------------------<  113<H>  3.5   |  35<H>  |  0.96    Ca    7.6<L>      10 Nov 2018 05:50  Phos  3.6     11-10  Mg     1.8     11-10    TPro  6.2  /  Alb  2.7<L>  /  TBili  7.3<H>  /  DBili  x   /  AST  48<H>  /  ALT  24  /  AlkPhos  301<H>  11-10    PT/INR - ( 10 Nov 2018 08:06 )   PT: 22.5 sec;   INR: 1.97 ratio         PTT - ( 10 Nov 2018 08:06 )  PTT:31.6 sec    CAPILLARY BLOOD GLUCOSE      POCT Blood Glucose.: 209 mg/dL (10 Nov 2018 11:45)  POCT Blood Glucose.: 128 mg/dL (10 Nov 2018 07:54)  POCT Blood Glucose.: 182 mg/dL (09 Nov 2018 21:33)  POCT Blood Glucose.: 185 mg/dL (09 Nov 2018 17:46)  POCT Blood Glucose.: 184 mg/dL (09 Nov 2018 14:30)  POCT Blood Glucose.: 162 mg/dL (09 Nov 2018 13:10)          REVIEW OF SYSTEMS:  CONSTITUTIONAL: No fever, weight loss, or fatigue  EYES: No eye pain, visual disturbances, or discharge  ENMT:  No difficulty hearing, tinnitus, vertigo; No sinus or throat pain  NECK: No pain or stiffness  RESPIRATORY: No cough, wheezing, chills or hemoptysis; No shortness of breath  CARDIOVASCULAR: No chest pain, palpitations, dizziness, but  leg swelling  GASTROINTESTINAL: No abdominal or epigastric pain. No nausea, vomiting, or hematemesis; No diarrhea or constipation. No melena or hematochezia.  GENITOURINARY: No dysuria, frequency, hematuria, or incontinence  NEUROLOGICAL: No headaches, memory loss, loss of strength, numbness, or tremors    Consultant(s) Notes Reviewed:  [x ] YES  [ ] NO    PHYSICAL EXAM:  GENERAL: NAD, well-groomed, well-developed, not in any distress ,NC Oxygen   HEAD:  Atraumatic, Normocephalic  EYES: EOMI, PERRLA, conjunctiva and sclera clear  ENMT: No tonsillar erythema, exudates, or enlargement; Moist mucous membranes, Good dentition, No lesions  NECK: Supple, No JVD, Normal thyroid  NERVOUS SYSTEM:  Alert & Oriented X3, No focal deficit   CHEST/LUNG: Good air entry bilateral with no  rales, rhonchi, wheezing, or rubs  HEART: Regular rate and rhythm; No murmurs, rubs, or gallops  ABDOMEN: Soft, Nontender, Nondistended; Bowel sounds present  EXTREMITIES:  2+ Peripheral Pulses, No clubbing, cyanosis, but ++edema  SKIN: No rashes or lesions    Care Discussed with Consultants/Other Providers [ x] YES  [ ] NO

## 2018-11-11 LAB
ANION GAP SERPL CALC-SCNC: 12 MMOL/L — SIGNIFICANT CHANGE UP (ref 5–17)
BUN SERPL-MCNC: 19 MG/DL — SIGNIFICANT CHANGE UP (ref 7–23)
CALCIUM SERPL-MCNC: 8.2 MG/DL — LOW (ref 8.4–10.5)
CHLORIDE SERPL-SCNC: 87 MMOL/L — LOW (ref 96–108)
CO2 SERPL-SCNC: 35 MMOL/L — HIGH (ref 22–31)
CREAT SERPL-MCNC: 0.78 MG/DL — SIGNIFICANT CHANGE UP (ref 0.5–1.3)
FOLATE SERPL-MCNC: 12.6 NG/ML — SIGNIFICANT CHANGE UP
GLUCOSE BLDC GLUCOMTR-MCNC: 160 MG/DL — HIGH (ref 70–99)
GLUCOSE BLDC GLUCOMTR-MCNC: 167 MG/DL — HIGH (ref 70–99)
GLUCOSE BLDC GLUCOMTR-MCNC: 179 MG/DL — HIGH (ref 70–99)
GLUCOSE BLDC GLUCOMTR-MCNC: 95 MG/DL — SIGNIFICANT CHANGE UP (ref 70–99)
GLUCOSE SERPL-MCNC: 156 MG/DL — HIGH (ref 70–99)
INR BLD: 1.8 RATIO — HIGH (ref 0.88–1.16)
POTASSIUM SERPL-MCNC: 3.6 MMOL/L — SIGNIFICANT CHANGE UP (ref 3.5–5.3)
POTASSIUM SERPL-SCNC: 3.6 MMOL/L — SIGNIFICANT CHANGE UP (ref 3.5–5.3)
PROTHROM AB SERPL-ACNC: 20.9 SEC — HIGH (ref 10–12.9)
SODIUM SERPL-SCNC: 134 MMOL/L — LOW (ref 135–145)
T4 FREE SERPL-MCNC: 1.1 NG/DL — SIGNIFICANT CHANGE UP (ref 0.9–1.8)
VIT B12 SERPL-MCNC: >2000 PG/ML — HIGH (ref 232–1245)

## 2018-11-11 PROCEDURE — 93010 ELECTROCARDIOGRAM REPORT: CPT

## 2018-11-11 RX ORDER — POTASSIUM CHLORIDE 20 MEQ
40 PACKET (EA) ORAL EVERY 4 HOURS
Qty: 0 | Refills: 0 | Status: COMPLETED | OUTPATIENT
Start: 2018-11-11 | End: 2018-11-11

## 2018-11-11 RX ORDER — LIDOCAINE 4 G/100G
2 CREAM TOPICAL ONCE
Qty: 0 | Refills: 0 | Status: COMPLETED | OUTPATIENT
Start: 2018-11-11 | End: 2018-11-11

## 2018-11-11 RX ORDER — CALCITRIOL 0.5 UG/1
0.5 CAPSULE ORAL DAILY
Qty: 0 | Refills: 0 | Status: DISCONTINUED | OUTPATIENT
Start: 2018-11-11 | End: 2018-11-23

## 2018-11-11 RX ADMIN — Medication 40 MILLIEQUIVALENT(S): at 14:58

## 2018-11-11 RX ADMIN — LIDOCAINE 2 PATCH: 4 CREAM TOPICAL at 19:00

## 2018-11-11 RX ADMIN — Medication 175 MICROGRAM(S): at 06:04

## 2018-11-11 RX ADMIN — GABAPENTIN 100 MILLIGRAM(S): 400 CAPSULE ORAL at 15:00

## 2018-11-11 RX ADMIN — Medication 2 TABLET(S): at 14:59

## 2018-11-11 RX ADMIN — Medication 2001 MILLIGRAM(S): at 16:56

## 2018-11-11 RX ADMIN — Medication 2001 MILLIGRAM(S): at 08:50

## 2018-11-11 RX ADMIN — Medication 2 TABLET(S): at 06:04

## 2018-11-11 RX ADMIN — GABAPENTIN 100 MILLIGRAM(S): 400 CAPSULE ORAL at 06:04

## 2018-11-11 RX ADMIN — CALCITRIOL 0.5 MICROGRAM(S): 0.5 CAPSULE ORAL at 12:39

## 2018-11-11 RX ADMIN — Medication 2 TABLET(S): at 21:46

## 2018-11-11 RX ADMIN — Medication 1: at 16:57

## 2018-11-11 RX ADMIN — BUMETANIDE 2 MILLIGRAM(S): 0.25 INJECTION INTRAMUSCULAR; INTRAVENOUS at 06:04

## 2018-11-11 RX ADMIN — LIDOCAINE 2 PATCH: 4 CREAM TOPICAL at 18:05

## 2018-11-11 RX ADMIN — CALCITRIOL 0.5 MICROGRAM(S): 0.5 CAPSULE ORAL at 06:05

## 2018-11-11 RX ADMIN — Medication 200 MILLIGRAM(S): at 18:05

## 2018-11-11 RX ADMIN — Medication 1: at 12:35

## 2018-11-11 RX ADMIN — Medication 200 MILLIGRAM(S): at 06:05

## 2018-11-11 RX ADMIN — Medication 40 MILLIEQUIVALENT(S): at 18:06

## 2018-11-11 RX ADMIN — Medication 2001 MILLIGRAM(S): at 12:40

## 2018-11-11 RX ADMIN — GABAPENTIN 100 MILLIGRAM(S): 400 CAPSULE ORAL at 21:46

## 2018-11-11 RX ADMIN — Medication 81 MILLIGRAM(S): at 12:38

## 2018-11-11 NOTE — PROGRESS NOTE ADULT - SUBJECTIVE AND OBJECTIVE BOX
Patient denies chest pain or shortness of breath.   Review of systems otherwise (-)  	  MEDICATIONS:  MEDICATIONS  (STANDING):  aspirin  chewable 81 milliGRAM(s) Oral daily  buMETAnide Injectable 2 milliGRAM(s) IV Push daily  calcitriol   Capsule 0.5 MICROGram(s) Oral daily  calcium acetate 2001 milliGRAM(s) Oral three times a day with meals  calcium carbonate 1250 mG  + Vitamin D (OsCal 500 + D) 2 Tablet(s) Oral three times a day  cefpodoxime 200 milliGRAM(s) Oral every 12 hours  gabapentin 100 milliGRAM(s) Oral three times a day  influenza   Vaccine 0.5 milliLiter(s) IntraMuscular once  insulin lispro (HumaLOG) corrective regimen sliding scale   SubCutaneous three times a day before meals  insulin lispro (HumaLOG) corrective regimen sliding scale   SubCutaneous at bedtime  levothyroxine 175 MICROGram(s) Oral daily  milrinone Infusion 0.25 MICROgram(s)/kG/Min (7.26 mL/Hr) IV Continuous <Continuous>      LABS:	 	    CARDIAC MARKERS:                                10.3   7.00  )-----------( 143      ( 10 Nov 2018 08:06 )             32.1     Hemoglobin: 10.3 g/dL (11-10 @ 08:06)  Hemoglobin: 10.6 g/dL (11-09 @ 06:50)  Hemoglobin: 10.9 g/dL (11-08 @ 04:04)  Hemoglobin: 11.1 g/dL (11-07 @ 13:01)  Hemoglobin: 11.3 g/dL (11-07 @ 03:59)      11-11    134<L>  |  87<L>  |  19  ----------------------------<  156<H>  3.6   |  35<H>  |  0.78    Ca    8.2<L>      11 Nov 2018 05:28  Phos  3.6     11-10  Mg     1.8     11-10    TPro  6.2  /  Alb  2.7<L>  /  TBili  7.3<H>  /  DBili  x   /  AST  48<H>  /  ALT  24  /  AlkPhos  301<H>  11-10    Creatinine Trend: 0.78<--, 0.96<--, 0.94<--, 1.07<--, 1.26<--, 1.83<--        PHYSICAL EXAM:  T(C): 36.6 (11-11-18 @ 04:00), Max: 36.6 (11-10-18 @ 11:45)  HR: 97 (11-11-18 @ 10:57) (95 - 98)  BP: 117/77 (11-11-18 @ 10:57) (95/62 - 117/77)  RR: 18 (11-11-18 @ 10:57) (17 - 18)  SpO2: 97% (11-11-18 @ 10:57) (97% - 100%)  Wt(kg): --  I&O's Summary    10 Nov 2018 07:01  -  11 Nov 2018 07:00  --------------------------------------------------------  IN: 900 mL / OUT: 3350 mL / NET: -2450 mL    11 Nov 2018 07:01  -  11 Nov 2018 11:18  --------------------------------------------------------  IN: 240 mL / OUT: 200 mL / NET: 40 mL          Gen: Appears well in NAD  HEENT:  (-)icterus (-)pallor  CV: N S1 S2 1/6 ROSALINDA (+)2 Pulses B/l  Resp:  Clear to ausculatation B/L, normal effort  GI: (+) BS Soft, NT, ND  Lymph:  (+)Edema, (-)obvious lymphadenopathy  Skin: Warm to touch, Normal turgor  Psych: Appropriate mood and affect      TELEMETRY: 	  Sinus SInus Tach        ASSESSMENT/PLAN: 	63y  Female ith PMHx of NICM s/p AICD, HTN, moderate-severe MR, DM, ? medication compliance who was admitted with volume overload and heart failure.      - Cont to keep net negative  - On Bumex  - Cont to hold (-) inotrope  - Maintain strict I+O's  - On Milrinone gtt    Augusto Grimes MD, FACC

## 2018-11-11 NOTE — PROGRESS NOTE ADULT - SUBJECTIVE AND OBJECTIVE BOX
GASTROENTEROLOGY    Patient seen and examined at bedside  No complaints offered.   No abdominal pain  Denies nausea and vomiting. Tolerating diet.         MEDICATIONS  (STANDING):  aspirin  chewable 81 milliGRAM(s) Oral daily  buMETAnide Injectable 2 milliGRAM(s) IV Push daily  calcitriol   Capsule 0.5 MICROGram(s) Oral daily  calcium acetate 2001 milliGRAM(s) Oral three times a day with meals  calcium carbonate 1250 mG  + Vitamin D (OsCal 500 + D) 2 Tablet(s) Oral three times a day  cefpodoxime 200 milliGRAM(s) Oral every 12 hours  gabapentin 100 milliGRAM(s) Oral three times a day  influenza   Vaccine 0.5 milliLiter(s) IntraMuscular once  insulin lispro (HumaLOG) corrective regimen sliding scale   SubCutaneous three times a day before meals  insulin lispro (HumaLOG) corrective regimen sliding scale   SubCutaneous at bedtime  levothyroxine 175 MICROGram(s) Oral daily  milrinone Infusion 0.25 MICROgram(s)/kG/Min (7.26 mL/Hr) IV Continuous <Continuous>  potassium chloride    Tablet ER 40 milliEquivalent(s) Oral every 4 hours        T(F): 98.3 (11-11-18 @ 10:57), Max: 98.3 (11-11-18 @ 10:57)  HR: 97 (11-11-18 @ 10:57) (95 - 98)  BP: 117/77 (11-11-18 @ 10:57) (95/62 - 117/77)  RR: 18 (11-11-18 @ 10:57) (18 - 18)  SpO2: 97% (11-11-18 @ 10:57) (97% - 100%)  Wt(kg): --    PHYSICAL EXAM  GENERAL:   NAD  HEENT:  NC/AT, no JVD, sclera-anicteric  CHEST:  clear to ascultation bilaterally, respirations nonlabored  HEART:  +S1+S2 regular rate and rhythm   ABDOMEN:  Soft, non-tender, non-distended, normoactive bowel sounds, no masses  EXTEREMITIES:  + b/l LE edema  NEURO:  Alert, oriented, no asterixis, no tremor, no encephalopathy  SKIN:  No rash/warm/dry      LABS:    11-11    134<L>  |  87<L>  |  19  ----------------------------<  156<H>  3.6   |  35<H>  |  0.78    Ca    8.2<L>      11 Nov 2018 05:28  Phos  3.6     11-10  Mg     1.8     11-10    TPro  6.2  /  Alb  2.7<L>  /  TBili  7.3<H>  /  DBili  x   /  AST  48<H>  /  ALT  24  /  AlkPhos  301<H>  11-10    LIVER FUNCTIONS - ( 10 Nov 2018 05:50 )  Alb: 2.7 g/dL / Pro: 6.2 g/dL / ALK PHOS: 301 U/L / ALT: 24 U/L / AST: 48 U/L / GGT: x           PT/INR - ( 11 Nov 2018 11:06 )   PT: 20.9 sec;   INR: 1.80 ratio         PTT - ( 10 Nov 2018 08:06 )  PTT:31.6 sec  I&O's Detail    10 Nov 2018 07:01  -  11 Nov 2018 07:00  --------------------------------------------------------  IN:    Oral Fluid: 900 mL  Total IN: 900 mL    OUT:    Voided: 3350 mL  Total OUT: 3350 mL    Total NET: -2450 mL      11 Nov 2018 07:01  -  11 Nov 2018 13:48  --------------------------------------------------------  IN:    Oral Fluid: 480 mL  Total IN: 480 mL    OUT:    Voided: 950 mL  Total OUT: 950 mL    Total NET: -470 mL        Vitamin B12, Serum: >2000 pg/mL (11-11 @ 08:26)

## 2018-11-11 NOTE — PROGRESS NOTE ADULT - ATTENDING COMMENTS
Agree with above.   -continue with diuresis per heart failure and cardiology  -no ischemic eval needed at this time    Kunal Muller MD

## 2018-11-11 NOTE — PROGRESS NOTE ADULT - SUBJECTIVE AND OBJECTIVE BOX
NEPHROLOGY - NSN    Patient seen and examined.  No more VB/ feels OK today - just woke up    MEDICATIONS  (STANDING):  aspirin  chewable 81 milliGRAM(s) Oral daily  buMETAnide Injectable 2 milliGRAM(s) IV Push daily  calcitriol   Capsule 0.5 MICROGram(s) Oral daily  calcium acetate 2001 milliGRAM(s) Oral three times a day with meals  calcium carbonate 1250 mG  + Vitamin D (OsCal 500 + D) 2 Tablet(s) Oral three times a day  cefpodoxime 200 milliGRAM(s) Oral every 12 hours  gabapentin 100 milliGRAM(s) Oral three times a day  influenza   Vaccine 0.5 milliLiter(s) IntraMuscular once  insulin lispro (HumaLOG) corrective regimen sliding scale   SubCutaneous three times a day before meals  insulin lispro (HumaLOG) corrective regimen sliding scale   SubCutaneous at bedtime  levothyroxine 175 MICROGram(s) Oral daily  milrinone Infusion 0.25 MICROgram(s)/kG/Min (7.26 mL/Hr) IV Continuous <Continuous>    VITALS:  T(C): , Max: 36.8 (11-11-18 @ 10:57)  T(F): , Max: 98.3 (11-11-18 @ 10:57)  HR: 97 (11-11-18 @ 10:57)  BP: 117/77 (11-11-18 @ 10:57)  BP(mean): --  RR: 18 (11-11-18 @ 10:57)  SpO2: 97% (11-11-18 @ 10:57)  Wt(kg): --  I and O's:    11-10 @ 07:01  -  11-11 @ 07:00  --------------------------------------------------------  IN: 900 mL / OUT: 3350 mL / NET: -2450 mL    11-11 @ 07:01  -  11-11 @ 13:04  --------------------------------------------------------  IN: 240 mL / OUT: 950 mL / NET: -710 mL    REVIEW OF SYSTEMS:  Full ROS done and were negative unless otherwise indicated in HPI/assessment.     PHYSICAL EXAM:  Constitutional: NAD  Respiratory: CTA B/L  Cardiovascular: S1 and S2, RRR  Gastrointestinal: + BS, soft, NT, ND  Extremities: + edema  Neurological: AAO x 3  : No Gustafson  Access: Not applicable    LABS:                        10.3   7.00  )-----------( 143      ( 10 Nov 2018 08:06 )             32.1     11-11    134<L>  |  87<L>  |  19  ----------------------------<  156<H>  3.6   |  35<H>  |  0.78    Ca    8.2<L>      11 Nov 2018 05:28  Phos  3.6     11-10  Mg     1.8     11-10    TPro  6.2  /  Alb  2.7<L>  /  TBili  7.3<H>  /  DBili  x   /  AST  48<H>  /  ALT  24  /  AlkPhos  301<H>  11-10    ASSESSMENT/RECOMMEND  63F w/ HTN, DM2, thyroid CA s/p resection, acquired hypoparathyroidism, and severe HFrEF, 11/6/18 a/w vaginal bleeding/MAGNUS/electrolyte derangements/ acute on chronic HFrEF    (1)Renal - MAGNUS - Prerenally mediated in setting of decompensated CHF.  Resolved   (2)Hypokalemia - due to diuresis, improving  (3)Hypomagnesemia - due to diuresis, improved  (4)Bone - hypocalcemia/hyperphosphatemia due to hypoparathyroidism (s/p iatrogenic parathyroidectomy). Very chronic. Corrected Ca within normal limits   (5)Hyponatremia - due to decompensated CHF. Stable  (6)CV - hypervolemia improving cor-assisted diuresis    RECOMMEND:  (1)Primacor + Bumex as ordered per cardiology  (2)A/W decreasing Calcitriol, continue phoslo and calcium carbonate   (3)maintain 1 L free water restriction  (4)Maintain potassium of 4, give KCL 40 meq PO x 2 doses today   (5)daily BMP/MG  (6)Dose new meds for GFR 40-50ml/min (present dosing is acceptable)    Nannette Rice NP  [facility]  [phone] NEPHROLOGY - NSN    Patient seen and examined.  No more VB/ feels OK today - just woke up    MEDICATIONS  (STANDING):  aspirin  chewable 81 milliGRAM(s) Oral daily  buMETAnide Injectable 2 milliGRAM(s) IV Push daily  calcitriol   Capsule 0.5 MICROGram(s) Oral daily  calcium acetate 2001 milliGRAM(s) Oral three times a day with meals  calcium carbonate 1250 mG  + Vitamin D (OsCal 500 + D) 2 Tablet(s) Oral three times a day  cefpodoxime 200 milliGRAM(s) Oral every 12 hours  gabapentin 100 milliGRAM(s) Oral three times a day  influenza   Vaccine 0.5 milliLiter(s) IntraMuscular once  insulin lispro (HumaLOG) corrective regimen sliding scale   SubCutaneous three times a day before meals  insulin lispro (HumaLOG) corrective regimen sliding scale   SubCutaneous at bedtime  levothyroxine 175 MICROGram(s) Oral daily  milrinone Infusion 0.25 MICROgram(s)/kG/Min (7.26 mL/Hr) IV Continuous <Continuous>    VITALS:  T(C): , Max: 36.8 (11-11-18 @ 10:57)  T(F): , Max: 98.3 (11-11-18 @ 10:57)  HR: 97 (11-11-18 @ 10:57)  BP: 117/77 (11-11-18 @ 10:57)  BP(mean): --  RR: 18 (11-11-18 @ 10:57)  SpO2: 97% (11-11-18 @ 10:57)  Wt(kg): --  I and O's:    11-10 @ 07:01  -  11-11 @ 07:00  --------------------------------------------------------  IN: 900 mL / OUT: 3350 mL / NET: -2450 mL    11-11 @ 07:01  -  11-11 @ 13:04  --------------------------------------------------------  IN: 240 mL / OUT: 950 mL / NET: -710 mL    REVIEW OF SYSTEMS:  Full ROS done and were negative unless otherwise indicated in HPI/assessment.     PHYSICAL EXAM:  Constitutional: NAD  Respiratory: CTA B/L  Cardiovascular: S1 and S2, RRR  Gastrointestinal: + BS, soft, NT, ND  Extremities: + edema  Neurological: AAO x 3  : external urinary catheter   Access: Not applicable    LABS:                        10.3   7.00  )-----------( 143      ( 10 Nov 2018 08:06 )             32.1     11-11    134<L>  |  87<L>  |  19  ----------------------------<  156<H>  3.6   |  35<H>  |  0.78    Ca    8.2<L>      11 Nov 2018 05:28  Phos  3.6     11-10  Mg     1.8     11-10    TPro  6.2  /  Alb  2.7<L>  /  TBili  7.3<H>  /  DBili  x   /  AST  48<H>  /  ALT  24  /  AlkPhos  301<H>  11-10    ASSESSMENT/RECOMMEND  63F w/ HTN, DM2, thyroid CA s/p resection, acquired hypoparathyroidism, and severe HFrEF, 11/6/18 a/w vaginal bleeding/MAGNUS/electrolyte derangements/ acute on chronic HFrEF    (1)Renal - MAGNUS - Prerenally mediated in setting of decompensated CHF.  Resolved   (2)Hypokalemia - due to diuresis, improving  (3)Hypomagnesemia - due to diuresis, improved  (4)Bone - hypocalcemia/hyperphosphatemia due to hypoparathyroidism (s/p iatrogenic parathyroidectomy). Very chronic. Corrected Ca within normal limits   (5)Hyponatremia - due to decompensated CHF. Stable  (6)CV - hypervolemia improving cor-assisted diuresis    RECOMMEND:  (1)Primacor + Bumex as ordered per cardiology  (2)A/W decreasing Calcitriol, continue phoslo and calcium carbonate   (3)maintain 1 L free water restriction  (4)Maintain potassium of 4, give KCL 40 meq PO x 2 doses today   (5)daily BMP/MG  (6)Dose new meds for GFR 40-50ml/min (present dosing is acceptable)    Nannette Rice NP  [facility]  [phone]

## 2018-11-11 NOTE — PROGRESS NOTE ADULT - PROBLEM SELECTOR PLAN 1
suspect congestive hepatopathy   prior imaging not impressive  serologic workup negative  LFT's continue to trend downward  complete workup would include transjugular liver biopsy with pressure measurement  Monitor LFT's daily

## 2018-11-11 NOTE — PROGRESS NOTE ADULT - SUBJECTIVE AND OBJECTIVE BOX
INTERVAL HPI/OVERNIGHT EVENTS: no new concerns.   Vital Signs Last 24 Hrs  T(C): 36.6 (11 Nov 2018 04:00), Max: 36.6 (10 Nov 2018 11:45)  T(F): 97.9 (11 Nov 2018 04:00), Max: 97.9 (10 Nov 2018 20:28)  HR: 97 (11 Nov 2018 10:57) (95 - 98)  BP: 117/77 (11 Nov 2018 10:57) (95/62 - 117/77)  BP(mean): --  RR: 18 (11 Nov 2018 10:57) (17 - 18)  SpO2: 97% (11 Nov 2018 10:57) (97% - 100%)  I&O's Summary    10 Nov 2018 07:01  -  11 Nov 2018 07:00  --------------------------------------------------------  IN: 900 mL / OUT: 3350 mL / NET: -2450 mL    11 Nov 2018 07:01  -  11 Nov 2018 11:20  --------------------------------------------------------  IN: 240 mL / OUT: 200 mL / NET: 40 mL      MEDICATIONS  (STANDING):  aspirin  chewable 81 milliGRAM(s) Oral daily  buMETAnide Injectable 2 milliGRAM(s) IV Push daily  calcitriol   Capsule 0.5 MICROGram(s) Oral daily  calcium acetate 2001 milliGRAM(s) Oral three times a day with meals  calcium carbonate 1250 mG  + Vitamin D (OsCal 500 + D) 2 Tablet(s) Oral three times a day  cefpodoxime 200 milliGRAM(s) Oral every 12 hours  gabapentin 100 milliGRAM(s) Oral three times a day  influenza   Vaccine 0.5 milliLiter(s) IntraMuscular once  insulin lispro (HumaLOG) corrective regimen sliding scale   SubCutaneous three times a day before meals  insulin lispro (HumaLOG) corrective regimen sliding scale   SubCutaneous at bedtime  levothyroxine 175 MICROGram(s) Oral daily  milrinone Infusion 0.25 MICROgram(s)/kG/Min (7.26 mL/Hr) IV Continuous <Continuous>    MEDICATIONS  (PRN):    LABS:                        10.3   7.00  )-----------( 143      ( 10 Nov 2018 08:06 )             32.1     11-11    134<L>  |  87<L>  |  19  ----------------------------<  156<H>  3.6   |  35<H>  |  0.78    Ca    8.2<L>      11 Nov 2018 05:28  Phos  3.6     11-10  Mg     1.8     11-10    TPro  6.2  /  Alb  2.7<L>  /  TBili  7.3<H>  /  DBili  x   /  AST  48<H>  /  ALT  24  /  AlkPhos  301<H>  11-10    PT/INR - ( 10 Nov 2018 08:06 )   PT: 22.5 sec;   INR: 1.97 ratio         PTT - ( 10 Nov 2018 08:06 )  PTT:31.6 sec    CAPILLARY BLOOD GLUCOSE      POCT Blood Glucose.: 95 mg/dL (11 Nov 2018 07:49)  POCT Blood Glucose.: 195 mg/dL (10 Nov 2018 21:30)  POCT Blood Glucose.: 146 mg/dL (10 Nov 2018 16:31)  POCT Blood Glucose.: 209 mg/dL (10 Nov 2018 11:45)          REVIEW OF SYSTEMS:  CONSTITUTIONAL: No fever, weight loss, or fatigue  EYES: No eye pain, visual disturbances, or discharge  ENMT:  No difficulty hearing, tinnitus, vertigo; No sinus or throat pain  NECK: No pain or stiffness  RESPIRATORY: No cough, wheezing, chills or hemoptysis; less  shortness of breath  CARDIOVASCULAR: No chest pain, palpitations, dizziness, less  leg swelling  GASTROINTESTINAL: No abdominal or epigastric pain. No nausea, vomiting, or hematemesis; No diarrhea or constipation. No melena or hematochezia.  GENITOURINARY: No dysuria, frequency, hematuria, or incontinence  NEUROLOGICAL: No headaches, memory loss, loss of strength, numbness, or tremors    Consultant(s) Notes Reviewed:  [x ] YES  [ ] NO    PHYSICAL EXAM:  GENERAL: NAD, well-groomed, well-developed,not in any distress ,  HEAD:  Atraumatic, Normocephalic  EYES: EOMI, PERRLA, conjunctiva and sclera clear  ENMT: No tonsillar erythema, exudates, or enlargement; Moist mucous membranes, Good dentition, No lesions  NECK: Supple, No JVD, Normal thyroid  NERVOUS SYSTEM:  Alert & Oriented X3, No focal deficit   CHEST/LUNG: Good air entry bilateral with no  rales, rhonchi, wheezing, or rubs  HEART: Regular rate and rhythm; No murmurs, rubs, or gallops  ABDOMEN: Soft, Nontender, Nondistended; Bowel sounds present  EXTREMITIES:  2+ Peripheral Pulses, No clubbing, cyanosis, but ++ edema  SKIN: No rashes or lesions    Care Discussed with Consultants/Other Providers [ x] YES  [ ] NO

## 2018-11-11 NOTE — CHART NOTE - NSCHARTNOTEFT_GEN_A_CORE
Called by RN to report Pt had a run of PAT with HR up to 140s for 3.2 seconds on tele. Seen and examined at the bedside. Patient is asymptomatic. EKG showed NSR with HR 98 bpm. NSR on tele now. will continue to monitor.    Vital Signs Last 24 Hrs  T(C): 36.8 (11 Nov 2018 10:57), Max: 36.8 (11 Nov 2018 10:57)  T(F): 98.3 (11 Nov 2018 10:57), Max: 98.3 (11 Nov 2018 10:57)  HR: 97 (11 Nov 2018 10:57) (95 - 98)  BP: 117/77 (11 Nov 2018 10:57) (95/62 - 117/77)  BP(mean): --  RR: 18 (11 Nov 2018 10:57) (18 - 18)  SpO2: 97% (11 Nov 2018 10:57) (97% - 100%)                          10.3   7.00  )-----------( 143      ( 10 Nov 2018 08:06 )             32.1       11-11    134<L>  |  87<L>  |  19  ----------------------------<  156<H>  3.6   |  35<H>  |  0.78    Ca    8.2<L>      11 Nov 2018 05:28  Phos  3.6     11-10  Mg     1.8     11-10    TPro  6.2  /  Alb  2.7<L>  /  TBili  7.3<H>  /  DBili  x   /  AST  48<H>  /  ALT  24  /  AlkPhos  301<H>  11-10      PT/INR - ( 11 Nov 2018 11:06 )   PT: 20.9 sec;   INR: 1.80 ratio         PTT - ( 10 Nov 2018 08:06 )  PTT:31.6 sec Called by RN to report Pt had a run of PAT with HR up to 140s for 3.2 seconds on tele. Seen and examined at the bedside. Patient is asymptomatic. EKG showed NSR with HR 99 bpm. Electronic Ventricular pacemaker on tele now. will continue to monitor.    Vital Signs Last 24 Hrs  T(C): 36.8 (11 Nov 2018 10:57), Max: 36.8 (11 Nov 2018 10:57)  T(F): 98.3 (11 Nov 2018 10:57), Max: 98.3 (11 Nov 2018 10:57)  HR: 97 (11 Nov 2018 10:57) (95 - 98)  BP: 117/77 (11 Nov 2018 10:57) (95/62 - 117/77)  BP(mean): --  RR: 18 (11 Nov 2018 10:57) (18 - 18)  SpO2: 97% (11 Nov 2018 10:57) (97% - 100%)                          10.3   7.00  )-----------( 143      ( 10 Nov 2018 08:06 )             32.1       11-11    134<L>  |  87<L>  |  19  ----------------------------<  156<H>  3.6   |  35<H>  |  0.78    Ca    8.2<L>      11 Nov 2018 05:28  Phos  3.6     11-10  Mg     1.8     11-10    TPro  6.2  /  Alb  2.7<L>  /  TBili  7.3<H>  /  DBili  x   /  AST  48<H>  /  ALT  24  /  AlkPhos  301<H>  11-10      PT/INR - ( 11 Nov 2018 11:06 )   PT: 20.9 sec;   INR: 1.80 ratio         PTT - ( 10 Nov 2018 08:06 )  PTT:31.6 sec

## 2018-11-11 NOTE — PROGRESS NOTE ADULT - SUBJECTIVE AND OBJECTIVE BOX
pt seen and examined, no complaints, ROS - .    aspirin  chewable 81 milliGRAM(s) Oral daily  buMETAnide Injectable 2 milliGRAM(s) IV Push daily  calcitriol   Capsule 0.5 MICROGram(s) Oral two times a day  calcium acetate 2001 milliGRAM(s) Oral three times a day with meals  calcium carbonate 1250 mG  + Vitamin D (OsCal 500 + D) 2 Tablet(s) Oral three times a day  cefpodoxime 200 milliGRAM(s) Oral every 12 hours  gabapentin 100 milliGRAM(s) Oral three times a day  influenza   Vaccine 0.5 milliLiter(s) IntraMuscular once  insulin lispro (HumaLOG) corrective regimen sliding scale   SubCutaneous three times a day before meals  insulin lispro (HumaLOG) corrective regimen sliding scale   SubCutaneous at bedtime  levothyroxine 175 MICROGram(s) Oral daily  milrinone Infusion 0.25 MICROgram(s)/kG/Min IV Continuous <Continuous>                            10.3   7.00  )-----------( 143      ( 10 Nov 2018 08:06 )             32.1       Hemoglobin: 10.3 g/dL (11-10 @ 08:06)  Hemoglobin: 10.6 g/dL (11-09 @ 06:50)  Hemoglobin: 10.9 g/dL (11-08 @ 04:04)  Hemoglobin: 11.1 g/dL (11-07 @ 13:01)  Hemoglobin: 11.3 g/dL (11-07 @ 03:59)      11-10    133<L>  |  86<L>  |  26<H>  ----------------------------<  113<H>  3.5   |  35<H>  |  0.96    Ca    7.6<L>      10 Nov 2018 05:50  Phos  3.6     11-10  Mg     1.8     11-10    TPro  6.2  /  Alb  2.7<L>  /  TBili  7.3<H>  /  DBili  x   /  AST  48<H>  /  ALT  24  /  AlkPhos  301<H>  11-10    Creatinine Trend: 0.96<--, 0.94<--, 1.07<--, 1.26<--, 1.83<--, 2.05<--    COAGS:           T(C): 36.6 (11-11-18 @ 04:00), Max: 36.6 (11-10-18 @ 11:45)  HR: 98 (11-11-18 @ 04:00) (95 - 98)  BP: 95/62 (11-11-18 @ 04:00) (95/62 - 108/74)  RR: 18 (11-11-18 @ 04:00) (17 - 18)  SpO2: 97% (11-11-18 @ 04:00) (97% - 100%)  Wt(kg): --    I&O's Summary    09 Nov 2018 07:01  -  10 Nov 2018 07:00  --------------------------------------------------------  IN: 595.7 mL / OUT: 1400 mL / NET: -804.3 mL    10 Nov 2018 07:01  -  11 Nov 2018 04:43  --------------------------------------------------------  IN: 900 mL / OUT: 3350 mL / NET: -2450 mL    	  Lymphatic: No lymphadenopathy + pitting edema in LE bilaterally   Cardiovascular: Normal S1 S2,RRR, No murmurs , Peripheral pulses palpable 2+ bilaterally  Respiratory: Lungs clear to auscultation, normal effort 	  Gastrointestinal:  Soft, distended   Skin: No rashes, No ecchymoses, No cyanosis,       TELEMETRY:	    ECG: < from: 12 Lead ECG (11.06.18 @ 19:34) >  Atrial-sensed ventricular-paced rhythm  Biventricular pacemaker detected  ABNORMAL ECG    < end of copied text >    	  RADIOLOGY:   DIAGNOSTIC TESTING:  [ ] Echocardiogram: < from: Transthoracic Echocardiogram (08.23.18 @ 11:25) >  1. Tethered mitral valve leaflets with normal opening.  Moderate-severe mitral regurgitation.  2. Normal left ventricular internal dimensions and wall  thicknesses.  3. Severe global left ventricular systolic dysfunction.  4. Moderate right atrial enlargement.  A device wire is  noted in the right heart.  5. Right ventricular enlargement with decreased right  ventricular systolic function.  6. Normal tricuspid valve.  Severe tricuspid regurgitation.  7. Estimated pulmonary artery systolic pressure equals 24  mm Hg, assuming right atrial pressure equals 10  mm Hg,  consistent with normal pulmonary pressures.  *** Compared with echocardiogram of 6/29/2018, no  significant changes noted.    < end of copied text >    [ ]  Catheterization:  [ ] Stress Test:    OTHER: 	      ASSESSMENT/PLAN: 64 y/o F with PMHx of NICM s/p AICD, HTN, moderate-severe MR, DM, ? medication compliance who was admitted with volume overload and heart failure.    - cont inotropic assisted diuresis and bumex   -   GI / DVT prophylaxis,  keep K>4, mag >2.0   - heart failure follow up   - cont synthroid   - no further EPS work up needed at present .   D/W Dr Hallman

## 2018-11-11 NOTE — CHART NOTE - NSCHARTNOTEFT_GEN_A_CORE
Patient with hypocalcemia 2/2 hypoparathyroidism. Patient not able to be seen at bedside, but  noted with calcium today corrected to 9.2. Recommend to decrease calcitriol to 0.5mcg once a day. C/w calcium carbonate. D/W primary NP

## 2018-11-11 NOTE — PROGRESS NOTE ADULT - ASSESSMENT
64yo post-menopausal F w/ pmh of CHF, thyroid ca, HTN, DM, daily ASA user,  c/o vaginal bleeding that she noticed this morning. Pt states she woke up feeling fatigued with abdominal pressure and that when she sat down, she noticed that she had bled through her underwear. Pt has not experienced any bleeding episodes like this in the past. she admits to normal urinary and bowel functions and denies any HA, dizziness, SOB, CP, N/V/D, constipation. Pt has no known uterine pathologies and has not seen a GYN in 15-20 years. Currently Pt reports fatigue and daughter states she has been in this state for the past couple weeks since she was discharged from hospital for CHF exacerbation.      Problem/Plan - 1:  ·  Problem: Acute on chronic systolic congestive heart failure.  Plan: Improving. IV Bumex and Dobutamine  . Cardiology and CHF team helping. Awaiting PICC Line.     Problem/Plan - 2:  ·  Problem: Acute renal failure.  Plan: Creatinine better.  Renal following .     Problem/Plan - 3:  ·  Problem: Hyperkalemia/ Hypokalemia .  Plan: Correcting.      Problem/Plan - 4:  ·  Problem: Hyponatremia.  Plan: Correcting . Management per renal.      Problem/Plan - 5:  ·  Problem:  Hypocalcemia.  Plan: Chronic . Replacing IV and PO ..      Problem/Plan - 6:  Problem:  Jaundice, hepatocellular. Plan: Likely from CHF . Monitoring LFT      Problem/Plan - 7:  ·  Problem: DM (diabetes mellitus).  Plan: SSI and Lantus .Sugars in good control.      Problem/Plan - 8:  ·  Problem: UTI .  Plan: IV Abxs for 5 days .     Problem/Plan - 9:  ·  Problem: Hypothyroidism.  Plan: Synthroid home dose and will check TFT in am.      Problem/Plan - 10:  Problem: Vaginal bleeding. Plan; GYn consult consulted and awaiting Endometrial BX so will reconsult GYN.       Problem/Plan - 11:  ·  Problem: Coagulopathy .  Plan: Sec to Liver injury. INR better. Holding DVT prophylaxis as vaginal bleeding.       Problem/Plan - 12:  ·  Problem: Diabetic Neuropathy  .  Plan: responding to Neurontin. Will check B12 and Folate as well TFT.

## 2018-11-11 NOTE — PROGRESS NOTE ADULT - SUBJECTIVE AND OBJECTIVE BOX
Sub: Patient seen and examined in the chair.  C/o B/L whole foot pain.  ROS otherwise negative.    MEDICATIONS  (STANDING):  aspirin  chewable 81 milliGRAM(s) Oral daily  buMETAnide Injectable 2 milliGRAM(s) IV Push daily  calcitriol   Capsule 0.5 MICROGram(s) Oral daily  calcium acetate 2001 milliGRAM(s) Oral three times a day with meals  calcium carbonate 1250 mG  + Vitamin D (OsCal 500 + D) 2 Tablet(s) Oral three times a day  cefpodoxime 200 milliGRAM(s) Oral every 12 hours  gabapentin 100 milliGRAM(s) Oral three times a day  influenza   Vaccine 0.5 milliLiter(s) IntraMuscular once  insulin lispro (HumaLOG) corrective regimen sliding scale   SubCutaneous three times a day before meals  insulin lispro (HumaLOG) corrective regimen sliding scale   SubCutaneous at bedtime  levothyroxine 175 MICROGram(s) Oral daily  milrinone Infusion 0.25 MICROgram(s)/kG/Min (7.26 mL/Hr) IV Continuous <Continuous>  potassium chloride    Tablet ER 40 milliEquivalent(s) Oral every 4 hours    LABS:                        10.3   7.00  )-----------( 143      ( 10 Nov 2018 08:06 )             32.1     134<L>  |  87<L>  |  19  ----------------------------<  156<H>  3.6   |  35<H>  |  0.78    Ca    8.2<L>      11 Nov 2018 05:28  Phos  3.6     11-10  Mg     1.8     11-10    TPro  6.2  /  Alb  2.7<L>  /  TBili  7.3<H>  /  DBili  x   /  AST  48<H>  /  ALT  24  /  AlkPhos  301<H>  11-10    Creatinine Trend: 0.78<--, 0.96<--, 0.94<--, 1.07<--, 1.26<--, 1.83<--   PT/INR - ( 11 Nov 2018 11:06 )   PT: 20.9 sec;   INR: 1.80 ratio      PHYSICAL EXAM  Vital Signs Last 24 Hrs  T(C): 36.8 (11 Nov 2018 10:57), Max: 36.8 (11 Nov 2018 10:57)  T(F): 98.3 (11 Nov 2018 10:57), Max: 98.3 (11 Nov 2018 10:57)  HR: 97 (11 Nov 2018 10:57) (95 - 98)  BP: 117/77 (11 Nov 2018 10:57) (95/62 - 117/77)  RR: 18 (11 Nov 2018 10:57) (18 - 18)  SpO2: 97% (11 Nov 2018 10:57) (97% - 100%)    	  Lymphatic: No lymphadenopathy + pitting edema in LE bilaterally   Cardiovascular: Normal S1 S2,RRR, No murmurs , Peripheral pulses difficult to palpate  Respiratory: Lungs clear to auscultation, normal effort 	  Gastrointestinal:  Soft, distended   Skin: No rashes, No ecchymoses, No cyanosis,     TELEMETRY: 	      ECG: < from: 12 Lead ECG (11.06.18 @ 19:34) >  Atrial-sensed ventricular-paced rhythm  Biventricular pacemaker detected  ABNORMAL ECG  < end of copied text >        < from: Xray Chest 1 View- PORTABLE-Routine (11.08.18 @ 08:51) >    Impression:    The heart is enlarged. Small left pleural effusion. The right lung is   clear. A pacer is in good position. A central line is seen on the right   and the tip is in superior vena cava. No pneumothorax.  < end of copied text >      < from: TTE with Doppler (w/Cont) (11.07.18 @ 05:53) >  ------------------------------------------------------------------------  Conclusions:  1. Tethered mitral valve leaflets with normal opening.  Mitral annular calcification and thickened  mitral leaflet  tips Moderate mitral regurgitation.  2. Calcified trileaflet aortic valve with normal opening.  3. Moderately dilated left atrium.  LA volume index = 43  cc/m2.  4. Eccentric left ventricular hypertrophy (dilated left  ventricle with normal relative wall thickness).  5. Severe global left ventricularsystolic dysfunction.  Endocardial visualization enhanced with intravenous  injection of Ultrasonic Enhancing Agent (Definity). No LV  thrombus.  Septal flattening consistent with right  ventricular overload.  6. Severe diastolic dysfunction with elevated filling  pressures  7. Severe right atrial enlargement.  8. Right ventricular enlargement with decreased right  ventricular systolic function.  A device wire is noted in  the right heart.  9. Dilated tricuspid annulus with malcoaptation of  tricuspid leaflets. Severe tricuspid regurgitation.    < end of copied text >  	  ICD check 11/7/18   · Additional Procedure Details	call to interrogate device to check arrhythmias normal sensing and pacing thresholds , stable lead impedances AT AF burden <0.1 % ,optivol fluid index below impedence line indicating no fluid accumulation No stored data for review	  · Comments	3.8 years battery life	  · Underlying Rhythm	SR 80's, not pacemaker dependent	  · % Bi-V Paced	100%	  · Battery	Good	      ASSESSMENT/PLAN: 62 y/o F with PMHx of NICM s/p AICD, HTN, moderate-severe MR, DM, medication non-compliance who was admitted with volume overload and acute on chronic decomp CHF       -- Continue to maintain net negative.  -- TTE reveals severe LV dysfunction with moderate- severe MR and severe TR     - structural heart eval noted - re-evaulate for possible raul clip when euvolemic   -lactate/LFTS/INR downtrending   -monitor pedal pulses  -no urgent ischemic evaluation needed at this time  -ICD interrogation with NSR    -appreciate GYN consultation - eventual endometrial bx  -heart failure recs noted -  at this time given patient's history of non compliance, she is a poor candidate for advanced therapies/transplant

## 2018-11-12 DIAGNOSIS — Z51.5 ENCOUNTER FOR PALLIATIVE CARE: ICD-10-CM

## 2018-11-12 DIAGNOSIS — Z71.89 OTHER SPECIFIED COUNSELING: ICD-10-CM

## 2018-11-12 LAB
ANION GAP SERPL CALC-SCNC: 8 MMOL/L — SIGNIFICANT CHANGE UP (ref 5–17)
BUN SERPL-MCNC: 16 MG/DL — SIGNIFICANT CHANGE UP (ref 7–23)
CALCIUM SERPL-MCNC: 8.6 MG/DL — SIGNIFICANT CHANGE UP (ref 8.4–10.5)
CHLORIDE SERPL-SCNC: 88 MMOL/L — LOW (ref 96–108)
CO2 SERPL-SCNC: 38 MMOL/L — HIGH (ref 22–31)
CREAT SERPL-MCNC: 0.86 MG/DL — SIGNIFICANT CHANGE UP (ref 0.5–1.3)
GLUCOSE BLDC GLUCOMTR-MCNC: 114 MG/DL — HIGH (ref 70–99)
GLUCOSE BLDC GLUCOMTR-MCNC: 135 MG/DL — HIGH (ref 70–99)
GLUCOSE BLDC GLUCOMTR-MCNC: 170 MG/DL — HIGH (ref 70–99)
GLUCOSE BLDC GLUCOMTR-MCNC: 180 MG/DL — HIGH (ref 70–99)
GLUCOSE SERPL-MCNC: 120 MG/DL — HIGH (ref 70–99)
HCT VFR BLD CALC: 33.7 % — LOW (ref 34.5–45)
HGB BLD-MCNC: 10.6 G/DL — LOW (ref 11.5–15.5)
INR BLD: 1.76 RATIO — HIGH (ref 0.88–1.16)
MAGNESIUM SERPL-MCNC: 1.4 MG/DL — LOW (ref 1.6–2.6)
MCHC RBC-ENTMCNC: 22.5 PG — LOW (ref 27–34)
MCHC RBC-ENTMCNC: 31.5 GM/DL — LOW (ref 32–36)
MCV RBC AUTO: 71.4 FL — LOW (ref 80–100)
PLATELET # BLD AUTO: 119 K/UL — LOW (ref 150–400)
POTASSIUM SERPL-MCNC: 4 MMOL/L — SIGNIFICANT CHANGE UP (ref 3.5–5.3)
POTASSIUM SERPL-SCNC: 4 MMOL/L — SIGNIFICANT CHANGE UP (ref 3.5–5.3)
PROTHROM AB SERPL-ACNC: 20.1 SEC — HIGH (ref 10–13.1)
RBC # BLD: 4.72 M/UL — SIGNIFICANT CHANGE UP (ref 3.8–5.2)
RBC # FLD: 24.6 % — HIGH (ref 10.3–14.5)
SODIUM SERPL-SCNC: 134 MMOL/L — LOW (ref 135–145)
WBC # BLD: 7.22 K/UL — SIGNIFICANT CHANGE UP (ref 3.8–10.5)
WBC # FLD AUTO: 7.22 K/UL — SIGNIFICANT CHANGE UP (ref 3.8–10.5)

## 2018-11-12 PROCEDURE — 99253 IP/OBS CNSLTJ NEW/EST LOW 45: CPT

## 2018-11-12 PROCEDURE — 99497 ADVNCD CARE PLAN 30 MIN: CPT

## 2018-11-12 PROCEDURE — 71045 X-RAY EXAM CHEST 1 VIEW: CPT | Mod: 26

## 2018-11-12 PROCEDURE — 99232 SBSQ HOSP IP/OBS MODERATE 35: CPT | Mod: GC

## 2018-11-12 PROCEDURE — 99233 SBSQ HOSP IP/OBS HIGH 50: CPT | Mod: GC

## 2018-11-12 RX ORDER — GABAPENTIN 400 MG/1
200 CAPSULE ORAL THREE TIMES A DAY
Qty: 0 | Refills: 0 | Status: DISCONTINUED | OUTPATIENT
Start: 2018-11-12 | End: 2018-11-23

## 2018-11-12 RX ORDER — BUMETANIDE 0.25 MG/ML
3 INJECTION INTRAMUSCULAR; INTRAVENOUS EVERY 12 HOURS
Qty: 0 | Refills: 0 | Status: DISCONTINUED | OUTPATIENT
Start: 2018-11-12 | End: 2018-11-14

## 2018-11-12 RX ORDER — MAGNESIUM SULFATE 500 MG/ML
2 VIAL (ML) INJECTION ONCE
Qty: 0 | Refills: 0 | Status: COMPLETED | OUTPATIENT
Start: 2018-11-12 | End: 2018-11-12

## 2018-11-12 RX ORDER — BUMETANIDE 0.25 MG/ML
3 INJECTION INTRAMUSCULAR; INTRAVENOUS
Qty: 0 | Refills: 0 | Status: DISCONTINUED | OUTPATIENT
Start: 2018-11-12 | End: 2018-11-12

## 2018-11-12 RX ADMIN — GABAPENTIN 200 MILLIGRAM(S): 400 CAPSULE ORAL at 21:15

## 2018-11-12 RX ADMIN — Medication 2001 MILLIGRAM(S): at 08:00

## 2018-11-12 RX ADMIN — BUMETANIDE 2 MILLIGRAM(S): 0.25 INJECTION INTRAMUSCULAR; INTRAVENOUS at 05:17

## 2018-11-12 RX ADMIN — Medication 2 TABLET(S): at 21:15

## 2018-11-12 RX ADMIN — Medication 81 MILLIGRAM(S): at 13:26

## 2018-11-12 RX ADMIN — BUMETANIDE 124 MILLIGRAM(S): 0.25 INJECTION INTRAMUSCULAR; INTRAVENOUS at 18:24

## 2018-11-12 RX ADMIN — Medication 200 MILLIGRAM(S): at 17:29

## 2018-11-12 RX ADMIN — CALCITRIOL 0.5 MICROGRAM(S): 0.5 CAPSULE ORAL at 13:26

## 2018-11-12 RX ADMIN — Medication 175 MICROGRAM(S): at 05:17

## 2018-11-12 RX ADMIN — MILRINONE LACTATE 7.26 MICROGRAM(S)/KG/MIN: 1 INJECTION, SOLUTION INTRAVENOUS at 21:15

## 2018-11-12 RX ADMIN — MILRINONE LACTATE 7.26 MICROGRAM(S)/KG/MIN: 1 INJECTION, SOLUTION INTRAVENOUS at 17:36

## 2018-11-12 RX ADMIN — Medication 50 GRAM(S): at 08:00

## 2018-11-12 RX ADMIN — Medication 2001 MILLIGRAM(S): at 13:26

## 2018-11-12 RX ADMIN — Medication 2 TABLET(S): at 05:17

## 2018-11-12 RX ADMIN — Medication 200 MILLIGRAM(S): at 05:17

## 2018-11-12 RX ADMIN — Medication 1: at 16:57

## 2018-11-12 RX ADMIN — LIDOCAINE 2 PATCH: 4 CREAM TOPICAL at 05:25

## 2018-11-12 RX ADMIN — Medication 2001 MILLIGRAM(S): at 17:29

## 2018-11-12 RX ADMIN — Medication 2 TABLET(S): at 13:26

## 2018-11-12 RX ADMIN — GABAPENTIN 100 MILLIGRAM(S): 400 CAPSULE ORAL at 05:17

## 2018-11-12 RX ADMIN — GABAPENTIN 100 MILLIGRAM(S): 400 CAPSULE ORAL at 13:26

## 2018-11-12 NOTE — PROGRESS NOTE ADULT - SUBJECTIVE AND OBJECTIVE BOX
Chief Complaint: Evaluating this 64 y/o F for thyroid cancer s/p total thyroidectomy with post-operative hypothyroidism and hypoparathyroidism.     History: Patient reports feeling well. has placement of PICC line today. No CP, SOB, abd pain, N/V. Denies paresthesias.    MEDICATIONS  (STANDING):  aspirin  chewable 81 milliGRAM(s) Oral daily  buMETAnide Injectable 2 milliGRAM(s) IV Push daily  calcitriol   Capsule 0.5 MICROGram(s) Oral daily  calcium acetate 2001 milliGRAM(s) Oral three times a day with meals  calcium carbonate 1250 mG  + Vitamin D (OsCal 500 + D) 2 Tablet(s) Oral three times a day  cefpodoxime 200 milliGRAM(s) Oral every 12 hours  gabapentin 100 milliGRAM(s) Oral three times a day  influenza   Vaccine 0.5 milliLiter(s) IntraMuscular once  insulin lispro (HumaLOG) corrective regimen sliding scale   SubCutaneous three times a day before meals  insulin lispro (HumaLOG) corrective regimen sliding scale   SubCutaneous at bedtime  levothyroxine 175 MICROGram(s) Oral daily  milrinone Infusion 0.25 MICROgram(s)/kG/Min (7.26 mL/Hr) IV Continuous <Continuous>    MEDICATIONS  (PRN):      Allergies    Isordil (Headache)  penicillin (Rash)    Intolerances      PHYSICAL EXAM:  VITALS: T(C): 36.3 (11-12-18 @ 11:29)  T(F): 97.3 (11-12-18 @ 11:29), Max: 98.8 (11-12-18 @ 04:00)  HR: 80 (11-12-18 @ 11:29) (80 - 115)  BP: 104/64 (11-12-18 @ 11:29) (101/59 - 108/73)  RR:  (17 - 18)  SpO2:  (96% - 99%)  Wt(kg): --  GENERAL: NAD at this time  EYES: EOMI, No proptosis  HEENT:  Atraumatic, Normocephalic,   GI: Soft, nontender, non distended  SKIN: Warm and dry  PSYCH: normal affect, normal mood    POCT Blood Glucose.: 135 mg/dL (11-12-18 @ 11:44)  POCT Blood Glucose.: 114 mg/dL (11-12-18 @ 07:46)  POCT Blood Glucose.: 160 mg/dL (11-11-18 @ 21:17)  POCT Blood Glucose.: 179 mg/dL (11-11-18 @ 16:36)  POCT Blood Glucose.: 167 mg/dL (11-11-18 @ 11:49)  POCT Blood Glucose.: 95 mg/dL (11-11-18 @ 07:49)  POCT Blood Glucose.: 195 mg/dL (11-10-18 @ 21:30)  POCT Blood Glucose.: 146 mg/dL (11-10-18 @ 16:31)  POCT Blood Glucose.: 209 mg/dL (11-10-18 @ 11:45)  POCT Blood Glucose.: 128 mg/dL (11-10-18 @ 07:54)  POCT Blood Glucose.: 182 mg/dL (11-09-18 @ 21:33)  POCT Blood Glucose.: 185 mg/dL (11-09-18 @ 17:46)  POCT Blood Glucose.: 184 mg/dL (11-09-18 @ 14:30)      11-12    134<L>  |  88<L>  |  16  ----------------------------<  120<H>  4.0   |  38<H>  |  0.86    EGFR if : 84  EGFR if non : 72    Ca    8.6      11-12  Mg     1.4     11-12  Phos  3.6     11-10    TPro  6.2  /  Alb  2.7<L>  /  TBili  7.3<H>  /  DBili  x   /  AST  48<H>  /  ALT  24  /  AlkPhos  301<H>  11-10          Thyroid Function Tests:  11-11 @ 08:26 TSH -- FreeT4 1.1 T3 -- Anti TPO -- Anti Thyroglobulin Ab -- TSI --  11-08 @ 09:24 TSH 0.39 FreeT4 1.5 T3 -- Anti TPO -- Anti Thyroglobulin Ab -- TSI --      Hemoglobin A1C, Whole Blood: 6.0 % <H> [4.0 - 5.6] (11-08-18 @ 08:09)  Hemoglobin A1C, Whole Blood: 6.4 % <H> [4.0 - 5.6] (08-21-18 @ 07:47) Chief Complaint: Evaluating this 62 y/o F for thyroid cancer s/p total thyroidectomy with post-operative hypothyroidism and hypoparathyroidism.     History: Patient reports feeling well. has placement of PICC line today. No CP, SOB, abd pain, N/V. Denies paresthesias.    MEDICATIONS  (STANDING):  aspirin  chewable 81 milliGRAM(s) Oral daily  buMETAnide Injectable 2 milliGRAM(s) IV Push daily  calcitriol   Capsule 0.5 MICROGram(s) Oral daily  calcium acetate 2001 milliGRAM(s) Oral three times a day with meals  calcium carbonate 1250 mG  + Vitamin D (OsCal 500 + D) 2 Tablet(s) Oral three times a day  cefpodoxime 200 milliGRAM(s) Oral every 12 hours  gabapentin 100 milliGRAM(s) Oral three times a day  influenza   Vaccine 0.5 milliLiter(s) IntraMuscular once  insulin lispro (HumaLOG) corrective regimen sliding scale   SubCutaneous three times a day before meals  insulin lispro (HumaLOG) corrective regimen sliding scale   SubCutaneous at bedtime  levothyroxine 175 MICROGram(s) Oral daily  milrinone Infusion 0.25 MICROgram(s)/kG/Min (7.26 mL/Hr) IV Continuous <Continuous>    MEDICATIONS  (PRN):      Allergies    Isordil (Headache)  penicillin (Rash)          PHYSICAL EXAM:  VITALS: T(C): 36.3 (11-12-18 @ 11:29)  T(F): 97.3 (11-12-18 @ 11:29), Max: 98.8 (11-12-18 @ 04:00)  HR: 80 (11-12-18 @ 11:29) (80 - 115)  BP: 104/64 (11-12-18 @ 11:29) (101/59 - 108/73)  RR:  (17 - 18)  SpO2:  (96% - 99%)  Wt(kg): --  GENERAL: NAD at this time  EYES: no injection b/l, No proptosis  HEENT:  Atraumatic, Normocephalic,   CVS: +s1, s2, rrr, no murmurs  GI: Soft, nontender, non distended  SKIN: Warm and dry      POCT Blood Glucose.: 135 mg/dL (11-12-18 @ 11:44)  POCT Blood Glucose.: 114 mg/dL (11-12-18 @ 07:46)  POCT Blood Glucose.: 160 mg/dL (11-11-18 @ 21:17)  POCT Blood Glucose.: 179 mg/dL (11-11-18 @ 16:36)  POCT Blood Glucose.: 167 mg/dL (11-11-18 @ 11:49)  POCT Blood Glucose.: 95 mg/dL (11-11-18 @ 07:49)  POCT Blood Glucose.: 195 mg/dL (11-10-18 @ 21:30)  POCT Blood Glucose.: 146 mg/dL (11-10-18 @ 16:31)  POCT Blood Glucose.: 209 mg/dL (11-10-18 @ 11:45)  POCT Blood Glucose.: 128 mg/dL (11-10-18 @ 07:54)  POCT Blood Glucose.: 182 mg/dL (11-09-18 @ 21:33)  POCT Blood Glucose.: 185 mg/dL (11-09-18 @ 17:46)  POCT Blood Glucose.: 184 mg/dL (11-09-18 @ 14:30)      11-12    134<L>  |  88<L>  |  16  ----------------------------<  120<H>  4.0   |  38<H>  |  0.86    EGFR if : 84  EGFR if non : 72    Ca    8.6      11-12  Mg     1.4     11-12  Phos  3.6     11-10    TPro  6.2  /  Alb  2.7<L>  /  TBili  7.3<H>  /  DBili  x   /  AST  48<H>  /  ALT  24  /  AlkPhos  301<H>  11-10          Thyroid Function Tests:  11-11 @ 08:26 FreeT4 1.1   11-08 @ 09:24 TSH 0.39 FreeT4 1.5     Hemoglobin A1C, Whole Blood: 6.0 % <H> [4.0 - 5.6] (11-08-18 @ 08:09)  Hemoglobin A1C, Whole Blood: 6.4 % <H> [4.0 - 5.6] (08-21-18 @ 07:47)

## 2018-11-12 NOTE — CONSULT NOTE ADULT - SUBJECTIVE AND OBJECTIVE BOX
HPI:  62yo post-menopausal F w/ pmh of CHF, thyroid ca, HTN, DM, daily ASA user,  c/o vaginal bleeding that she noticed this morning. Pt states she woke up feeling fatigued with abdominal pressure and that when she sat down, she noticed that she had bled through her underwear. Pt has not experienced any bleeding episodes like this in the past. she admits to normal urinary and bowel functions and denies any HA, dizziness, SOB, CP, N/V/D, constipation. Pt has no known uterine pathologies and has not seen a GYN in 15-20 years. Currently Pt reports fatigue and daughter states she has been in this state for the past couple weeks since she was discharged from hospital for CHF exacerbation. (06 Nov 2018 19:12)    Palliative care consulted for GOC. Patient seen and evaluated at bedside, eating lunch, daughter Inge present. Patient informed me that she has a long standing history of heart failure, but that she is not interested in talking about the future as she will "cross that bridge when she gets there." Explained that if she gets more sick, we may need to talk to her family to help with medical decision making- patient was open to filling out HCP form as she would want to designate her daughters as decision makers even though she is . HCP filled out; >16 minutes spent on advance care planning. Patient and family not receptive to further GOC discussion at this time.     PERTINENT PM/SXH:   Asthma  CHF (congestive heart failure)  DM (diabetes mellitus)  HTN (hypertension)  Thyroid ca    AICD (automatic cardioverter/defibrillator) present  S/P thyroidectomy    FAMILY HISTORY:  No pertinent family history in first degree relatives    ITEMS NOT CHECKED ARE NOT PRESENT    SOCIAL HISTORY:   Significant other/partner:  [ ]  Children:  [ ]  Zoroastrian/Spirituality:  Substance hx:  [ ]   Tobacco hx:  [ ]   Alcohol hx: [ ]   Home Opioid hx:  [ ] I-Stop Reference No:  Living Situation: [ ]Home  [ ]Long term care  [ ]Rehab [ ]Other    ADVANCE DIRECTIVES:    DNR  MOLST  [ ]  Living Will  [ ]   DECISION MAKER(s):  [x ] Health Care Proxy(s)  [ ] Surrogate(s)  [ ] Guardian           Name(s): Phone Number(s): Tamra Michaud [phone]    BASELINE (I)ADL(s) (prior to admission):  [place]: [x ]Total  [ ] Moderate [ ]Dependent    Allergies    Isordil (Headache)  penicillin (Rash)    Intolerances    MEDICATIONS  (STANDING):  aspirin  chewable 81 milliGRAM(s) Oral daily  buMETAnide Injectable 3 milliGRAM(s) IV Push two times a day  calcitriol   Capsule 0.5 MICROGram(s) Oral daily  calcium acetate 2001 milliGRAM(s) Oral three times a day with meals  calcium carbonate 1250 mG  + Vitamin D (OsCal 500 + D) 2 Tablet(s) Oral three times a day  cefpodoxime 200 milliGRAM(s) Oral every 12 hours  gabapentin 200 milliGRAM(s) Oral three times a day  influenza   Vaccine 0.5 milliLiter(s) IntraMuscular once  insulin lispro (HumaLOG) corrective regimen sliding scale   SubCutaneous three times a day before meals  insulin lispro (HumaLOG) corrective regimen sliding scale   SubCutaneous at bedtime  levothyroxine 175 MICROGram(s) Oral daily  milrinone Infusion 0.25 MICROgram(s)/kG/Min (7.26 mL/Hr) IV Continuous <Continuous>    MEDICATIONS  (PRN):    PRESENT SYMPTOMS: [ ]Unable to obtain due to poor mentation   Source if other than patient:  [ ]Family   [ ]Team     Pain (Impact on QOL):    Location -         Minimal acceptable level (0-10 scale):                    Aggrevating factors -  Quality -  Radiation -  Severity (0-10 scale) -    Timing -    PAIN AD Score:     [email or web address] (press ctrl +  left click to view)    Dyspnea:                           [ ]Mild [ ]Moderate [ ]Severe  Anxiety:                             [ ]Mild [ ]Moderate [ ]Severe  Fatigue:                             [x ]Mild [ ]Moderate [ ]Severe  Nausea:                             [ ]Mild [ ]Moderate [ ]Severe  Loss of appetite:              [ ]Mild [ ]Moderate [ ]Severe  Constipation:                    [ ]Mild [ ]Moderate [ ]Severe    Other Symptoms:  [x ]All other review of systems negative     Karnofsky Performance Score/Palliative Performance Status Version 2:    50%  PHYSICAL EXAM:  Vital Signs Last 24 Hrs  T(C): 36.3 (12 Nov 2018 11:29), Max: 37.1 (12 Nov 2018 04:00)  T(F): 97.3 (12 Nov 2018 11:29), Max: 98.8 (12 Nov 2018 04:00)  HR: 80 (12 Nov 2018 11:29) (80 - 115)  BP: 104/64 (12 Nov 2018 11:29) (101/59 - 108/73)  BP(mean): --  RR: 17 (12 Nov 2018 11:29) (17 - 18)  SpO2: 97% (12 Nov 2018 11:29) (96% - 99%) I&O's Summary    11 Nov 2018 07:01  -  12 Nov 2018 07:00  --------------------------------------------------------  IN: 1047.6 mL / OUT: 1950 mL / NET: -902.4 mL    12 Nov 2018 07:01  -  12 Nov 2018 15:49  --------------------------------------------------------  IN: 780 mL / OUT: 1600 mL / NET: -820 mL    GENERAL:  [x ]Alert  [x ]Oriented x3   [ ]Lethargic  [ ]Cachexia  [ ]Unarousable  [ ]Verbal  [ ]Non-Verbal  Behavioral:   [ ] Anxiety  [ ] Delirium [ ] Agitation [ ] Other  HEENT:  [x ]Normal   [ ]Dry mouth   [ ]ET Tube/Trach  [ ]Oral lesions  PULMONARY:   [ ]Clear [ ]Tachypnea  [ ]Audible excessive secretions   [ ]Rhonchi        [ ]Right [ ]Left [ ]Bilateral  [x ]Crackles        [ ]Right [ ]Left [x ]Bilateral  [ ]Wheezing     [ ]Right [ ]Left [ ]Bilateral  CARDIOVASCULAR:    [x ]Regular [ ]Irregular [ ]Tachy  [ ]Yimi [ ]Murmur [ ]Other  GASTROINTESTINAL:  [x ]Soft  [ ]Distended   [ ]+BS  [ x]Non tender [ ]Tender  [ ]PEG [ ]OGT/ NGT  Last BM: RN documentation reviewed  GENITOURINARY:  [x ]Normal [ ] Incontinent   [ ]Oliguria/Anuria   [ ]Gustafson  MUSCULOSKELETAL:   [ ]Normal   [x ]Weakness  [ ]Bed/Wheelchair bound [ ]Edema  NEUROLOGIC:   [x ]No focal deficits  [ ] Cognitive impairment  [ ] Dysphagia [ ]Dysarthria [ ] Paresis [ ]Other   SKIN:   [ x]Normal   [ ]Pressure ulcer(s)  [ ]Rash    CRITICAL CARE:  [ ] Shock Present  [ ]Septic [ ]Cardiogenic [ ]Neurologic [ ]Hypovolemic  [ ]  Vasopressors [x ]  Inotropes   [ ] Respiratory failure present  [ ] Acute  [ ] Chronic [ ] Hypoxic  [ ] Hypercarbic [ ] Other  [x ] Other organ failure - heart    LABS:                        10.6   7.22  )-----------( 119      ( 12 Nov 2018 07:45 )             33.7   11-12    134<L>  |  88<L>  |  16  ----------------------------<  120<H>  4.0   |  38<H>  |  0.86    Ca    8.6      12 Nov 2018 05:36  Mg     1.4     11-12    PT/INR - ( 12 Nov 2018 07:42 )   PT: 20.1 sec;   INR: 1.76 ratio        RADIOLOGY & ADDITIONAL STUDIES:  < from: TTE with Doppler (w/Cont) (11.07.18 @ 05:53) >    Patient name: EDLIA SERRANO  YOB: 1955   Age: 63 (F)   MR#: [id number]  Study Date: 11/7/2018  Location: [place] [id number]: Joselin Borden RDCS  Study quality: Technically fair  Referring Physician: Tiara Rojas MD  Blood Pressure: 85/68 mmHg  Height: 170 cm  Weight: 98 kg  BSA: 2.1 m2  ------------------------------------------------------------------------  PROCEDURE: Transthoracic echocardiogram with 2-D, M-Mode  and complete spectral and color flow Doppler. Verbal  consentwas obtained for injection of  Ultrasonic Enhancing  Agent following a discussion of risks and benefits.  Following intravenous injection of Ultrasonic Enhancing  Agent , harmonic imaging was performed.  INDICATION: Heart failure, unspecified (I50.9)  ------------------------------------------------------------------------  Dimensions:    Normal Values:  LA:     5.0    2.0 - 4.0 cm  Ao:     2.6    2.0 - 3.8 cm  SEPTUM: 0.8    0.6 - 1.2 cm  PWT:    0.9    0.6 - 1.1 cm  LVIDd:  6.1    3.0 - 5.6 cm  LVIDs:  5.5    1.8 - 4.0 cm  Derived variables:  LVMI: 99 g/m2  RWT: 0.29  Fractional short: 10 %  EF (Winter Rule): 9 %Doppler Peak Velocity (m/sec):  AoV=0.8  ------------------------------------------------------------------------  Observations:  Mitral Valve: Tethered mitral valve leaflets with normal  opening. Mitral annular calcification and thickened  mitral  leaflet tips Moderate mitral regurgitation.  Aortic Valve/Aorta: Calcified trileaflet aortic valve with  normal opening. Peak transaortic valve gradient equals 3 mm  Hg, mean transaortic valve gradient equals 1 mm Hg, aortic  valve velocity time integral equals 12 cm. Peak left  ventricular outflow tract gradient equals 1 mm Hg, mean  gradient is equal to 1 mm Hg, LVOT velocity time integral  equals 8 cm.  Aortic Root: 2.6 cm.  LVOT diameter: 1.8 cm.  Left Atrium: Moderately dilated left atrium.  LA volume  index = 43 cc/m2.  Left Ventricle: Severe global left ventricular systolic  dysfunction.  Endocardial visualization enhanced with  intravenous injection of Ultrasonic Enhancing Agent  (Definity). No LV thrombus.  Septal flattening consistent  with right ventricular overload. Eccentric left ventricular  hypertrophy (dilated left ventricle with normal relative  wall thickness). Severe diastolic dysfunction with elevated  filling pressures  Right Heart: Severe right atrial enlargement. Right  ventricular enlargement with decreased right ventricular  systolic function.  A device wire is noted in the right  heart. Dilated tricuspid annulus with malcoaptation of  tricuspid leaflets. Severe tricuspid regurgitation. Normal  pulmonic valve.  Pericardium/Pleura: Normal pericardium with no pericardial  effusion.  Hemodynamic: Estimated right ventricular systolic pressure  equals 19 mm Hg, assuming right atrial pressure equals 10 -  15 mm Hg, consistent with normal pulmonary  pressures.Estimated pulmonary artery systolic pressure  equals 19 mm Hg, However, this is likely underestimated in  the setting of severe TR  ------------------------------------------------------------------------  Conclusions:  1. Tethered mitral valve leaflets with normal opening.  Mitral annular calcification and thickened  mitral leaflet  tips Moderate mitral regurgitation.  2. Calcified trileaflet aortic valve with normal opening.  3. Moderately dilated left atrium.  LA volume index = 43  cc/m2.  4. Eccentric left ventricular hypertrophy (dilated left  ventricle with normal relative wall thickness).  5. Severe global left ventricularsystolic dysfunction.  Endocardial visualization enhanced with intravenous  injection of Ultrasonic Enhancing Agent (Definity). No LV  thrombus.  Septal flattening consistent with right  ventricular overload.  6. Severe diastolic dysfunction with elevated filling  pressures  7. Severe right atrial enlargement.  8. Right ventricular enlargement with decreased right  ventricular systolic function.  A device wire is noted in  the right heart.  9. Dilated tricuspid annulus with malcoaptation of  tricuspid leaflets. Severe tricuspid regurgitation.  *** No previous Echo exam.  ------------------------------------------------------------------------  Confirmed on  11/7/2018 - 16:40:23 by Haley Koch M.D.  ------------------------------------------------------------------------    < end of copied text >      PROTEIN CALORIE MALNUTRITION:   [ ] PPSV2 < or = to 30% [ ] significant weight loss  [x ] poor nutritional intake [ ] catabolic state [ ] anasarca     Albumin, Serum: 2.7 g/dL (11-10-18 @ 05:50)  Artificial Nutrition [ ]     REFERRALS: none  Goals of Care Discussion Document: none

## 2018-11-12 NOTE — PROGRESS NOTE ADULT - ATTENDING COMMENTS
Agree with above assessment and plan as outlined above.    - Cont milrinone, volume status slowly improving    Augusto Grimes MD, FACC

## 2018-11-12 NOTE — PROGRESS NOTE ADULT - PROBLEM SELECTOR PLAN 1
continue Milrinone 0.25 and diuresis with Bumetanide 3 mg IV BID   will consider BB therapy when euvolemic  discussed the possible need for advanced therapies and encouraged outpatient follow up in HF Clinic

## 2018-11-12 NOTE — CONSULT NOTE ADULT - PROBLEM SELECTOR RECOMMENDATION 4
palliative care will sign off at this time as patient not interested in further GOC or advance care planning conversations. Thank you for involving us in the care of this patient.
likely due to renal venous congestion, trend with diuresis

## 2018-11-12 NOTE — PROGRESS NOTE ADULT - ASSESSMENT
62yo F w/ pmh of CHF, papillary thyroid ca c/b hypoparathyroidism, HTN, DM (well controlled, A1C 6.0),  c/o vaginal bleeding. Admitted for CHF exacerbation. Consult for hypocalcemia.

## 2018-11-12 NOTE — PROGRESS NOTE ADULT - ASSESSMENT
The Pt is a 63 y/o woman with h/o NICM (LVEF 20%, LVIDd 6.5 cm), initially identified in 2013, s/p CRT-D (6/2017), Mod-Severe MR, Severe TR, HTN, HLD, DM II who presented to the ED with c/o vaginal bleeding in the setting of an elevated INR, which was likely the result of Congestive Hepatopathy. She has multiple findings that suggest that she is in Cardiogenic Shock complicated by UTI, and has been started on empiric inotropic support. She improved significantly with inotropic support and diuresis and is now planned for discharge with chronic inotropic support.

## 2018-11-12 NOTE — PROGRESS NOTE ADULT - ATTENDING COMMENTS
Agree with above.   -No ischemic evaluation needed at this time  -PICC line placement today for inotrope infusion  -follow up heart failure    Kunal Muller MD

## 2018-11-12 NOTE — PROGRESS NOTE ADULT - SUBJECTIVE AND OBJECTIVE BOX
INTERVAL HPI/OVERNIGHT EVENTS: no new concerns. My feet hurt again.   Vital Signs Last 24 Hrs  T(C): 36.3 (12 Nov 2018 11:29), Max: 37.1 (12 Nov 2018 04:00)  T(F): 97.3 (12 Nov 2018 11:29), Max: 98.8 (12 Nov 2018 04:00)  HR: 80 (12 Nov 2018 11:29) (80 - 115)  BP: 104/64 (12 Nov 2018 11:29) (101/59 - 108/73)  BP(mean): --  RR: 17 (12 Nov 2018 11:29) (17 - 18)  SpO2: 97% (12 Nov 2018 11:29) (96% - 99%)  I&O's Summary    11 Nov 2018 07:01  -  12 Nov 2018 07:00  --------------------------------------------------------  IN: 1047.6 mL / OUT: 1950 mL / NET: -902.4 mL    12 Nov 2018 07:01  -  12 Nov 2018 14:17  --------------------------------------------------------  IN: 780 mL / OUT: 1350 mL / NET: -570 mL      MEDICATIONS  (STANDING):  aspirin  chewable 81 milliGRAM(s) Oral daily  buMETAnide Injectable 2 milliGRAM(s) IV Push daily  calcitriol   Capsule 0.5 MICROGram(s) Oral daily  calcium acetate 2001 milliGRAM(s) Oral three times a day with meals  calcium carbonate 1250 mG  + Vitamin D (OsCal 500 + D) 2 Tablet(s) Oral three times a day  cefpodoxime 200 milliGRAM(s) Oral every 12 hours  gabapentin 200 milliGRAM(s) Oral three times a day  influenza   Vaccine 0.5 milliLiter(s) IntraMuscular once  insulin lispro (HumaLOG) corrective regimen sliding scale   SubCutaneous three times a day before meals  insulin lispro (HumaLOG) corrective regimen sliding scale   SubCutaneous at bedtime  levothyroxine 175 MICROGram(s) Oral daily  milrinone Infusion 0.25 MICROgram(s)/kG/Min (7.26 mL/Hr) IV Continuous <Continuous>    MEDICATIONS  (PRN):    LABS:                        10.6   7.22  )-----------( 119      ( 12 Nov 2018 07:45 )             33.7     11-12    134<L>  |  88<L>  |  16  ----------------------------<  120<H>  4.0   |  38<H>  |  0.86    Ca    8.6      12 Nov 2018 05:36  Mg     1.4     11-12      PT/INR - ( 12 Nov 2018 07:42 )   PT: 20.1 sec;   INR: 1.76 ratio             CAPILLARY BLOOD GLUCOSE      POCT Blood Glucose.: 135 mg/dL (12 Nov 2018 11:44)  POCT Blood Glucose.: 114 mg/dL (12 Nov 2018 07:46)  POCT Blood Glucose.: 160 mg/dL (11 Nov 2018 21:17)  POCT Blood Glucose.: 179 mg/dL (11 Nov 2018 16:36)          REVIEW OF SYSTEMS:  CONSTITUTIONAL: No fever, weight loss, or fatigue  EYES: No eye pain, visual disturbances, or discharge  ENMT:  No difficulty hearing, tinnitus, vertigo; No sinus or throat pain  NECK: No pain or stiffness  RESPIRATORY: No cough, wheezing, chills or hemoptysis; No shortness of breath  CARDIOVASCULAR: No chest pain, palpitations, dizziness, less  leg swelling  GASTROINTESTINAL: No abdominal or epigastric pain. No nausea, vomiting, or hematemesis; No diarrhea or constipation. No melena or hematochezia.  GENITOURINARY: No dysuria, frequency, hematuria, or incontinence  NEUROLOGICAL: No headaches, memory loss, loss of strength, numbness, or tremors    Consultant(s) Notes Reviewed:  [x ] YES  [ ] NO    PHYSICAL EXAM:  GENERAL: NAD, well-groomed, well-developed,not in any distress ,  HEAD:  Atraumatic, Normocephalic  EYES: EOMI, PERRLA, conjunctiva and sclera clear  ENMT: No tonsillar erythema, exudates, or enlargement; Moist mucous membranes, Good dentition, No lesions  NECK: Supple, No JVD, Normal thyroid  NERVOUS SYSTEM:  Alert & Oriented X3, No focal deficit   CHEST/LUNG: Good air entry bilateral with no  rales, rhonchi, wheezing, or rubs  HEART: Regular rate and rhythm; No murmurs, rubs, or gallops  ABDOMEN: Soft, Nontender, Nondistended; Bowel sounds present  EXTREMITIES:  2+ Peripheral Pulses, No clubbing, cyanosis, but less  edema    Care Discussed with Consultants/Other Providers [ x] YES  [ ] NO

## 2018-11-12 NOTE — PROGRESS NOTE ADULT - PROBLEM SELECTOR PLAN 1
Patient with hypoparathyroidism s/p thyroidectomy in 2000 for papillary thyroid cancer.   -Current corrected calcium of 9.6  -Currently on decreased dose of calcitriol 0.5mcg q daily and calcium carbonate 2 tab TID. Would continue current regiment   -monitor CMP, phos, mg daily.

## 2018-11-12 NOTE — CONSULT NOTE ADULT - PROBLEM SELECTOR RECOMMENDATION 3
>16 minutes spent on ACP, HCP filled out, copy in chart.   patient not receptive to goals of care conversation, "will cross that bridge when we get there."
-patient with Type 2 DM well controlled (A1C 6.0)  -currently FS acceptable range 100-180 inpatient. Can c/w low dose humalog correctional.  -can be discharged on home regiment with good control and f/u with outpatient endo.
suspect due to increased ADH secretion in shock state

## 2018-11-12 NOTE — CONSULT NOTE ADULT - PROBLEM SELECTOR PROBLEM 3
Hyponatremia
Advance care planning
Type 2 diabetes mellitus without complication, without long-term current use of insulin

## 2018-11-12 NOTE — PROGRESS NOTE ADULT - SUBJECTIVE AND OBJECTIVE BOX
Subjective: No acute events overnight. Pt feeling well and without acute complaints.     Medications:  aspirin  chewable 81 milliGRAM(s) Oral daily  buMETAnide IVPB 3 milliGRAM(s) IV Intermittent every 12 hours  calcitriol   Capsule 0.5 MICROGram(s) Oral daily  calcium acetate 2001 milliGRAM(s) Oral three times a day with meals  calcium carbonate 1250 mG  + Vitamin D (OsCal 500 + D) 2 Tablet(s) Oral three times a day  cefpodoxime 200 milliGRAM(s) Oral every 12 hours  gabapentin 200 milliGRAM(s) Oral three times a day  influenza   Vaccine 0.5 milliLiter(s) IntraMuscular once  insulin lispro (HumaLOG) corrective regimen sliding scale   SubCutaneous three times a day before meals  insulin lispro (HumaLOG) corrective regimen sliding scale   SubCutaneous at bedtime  levothyroxine 175 MICROGram(s) Oral daily  milrinone Infusion 0.25 MICROgram(s)/kG/Min IV Continuous <Continuous>    Vitals:  T(C): 36.3 (18 @ 11:29), Max: 37.1 (18 @ 04:00)  HR: 80 (18 @ 11:29) (80 - 115)  BP: 104/64 (18 @ 11:29) (101/59 - 108/73)  RR: 17 (18 @ 11:29) (17 - 18)  SpO2: 97% (18 @ 11:29) (96% - 99%)    Daily Weight in k.8 (2018 15:00)        I&O's Summary    2018 07:  -  2018 07:00  --------------------------------------------------------  IN: 1047.6 mL / OUT: 1950 mL / NET: -902.4 mL    2018 07:  -  2018 17:56  --------------------------------------------------------  IN: 1107.6 mL / OUT: 1600 mL / NET: -492.4 mL        Physical Exam:  Appearance: No Acute Distress  HEENT: JVP moderately elevated  Cardiovascular: RRR, Normal S1 S2  Respiratory: Clear to auscultation bilaterally  Gastrointestinal: Soft, Non-tender, non-distended	  Skin: no skin lesions  Neurologic: Non-focal  Extremities: No LE edema, warm and well perfused  Psychiatry: A & O x 3, Mood & affect appropriate      Labs:                        10.6   7.22  )-----------( 119      ( 2018 07:45 )             33.7     -12    134<L>  |  88<L>  |  16  ----------------------------<  120<H>  4.0   |  38<H>  |  0.86    Ca    8.6      2018 05:36  Mg     1.4             PT/INR - ( 2018 07:42 )   PT: 20.1 sec;   INR: 1.76 ratio               Serum Pro-Brain Natriuretic Peptide: 2414 pg/mL ( @ 04:04)

## 2018-11-12 NOTE — PROGRESS NOTE ADULT - ASSESSMENT
62yo post-menopausal F w/ pmh of CHF, thyroid ca, HTN, DM, daily ASA user,  c/o vaginal bleeding that she noticed this morning. Pt states she woke up feeling fatigued with abdominal pressure and that when she sat down, she noticed that she had bled through her underwear. Pt has not experienced any bleeding episodes like this in the past. she admits to normal urinary and bowel functions and denies any HA, dizziness, SOB, CP, N/V/D, constipation. Pt has no known uterine pathologies and has not seen a GYN in 15-20 years. Currently Pt reports fatigue and daughter states she has been in this state for the past couple weeks since she was discharged from hospital for CHF exacerbation.      Problem/Plan - 1:  ·  Problem: Acute on chronic systolic congestive heart failure.  Plan: Improving. IV Bumex and Milrinone  . Cardiology and CHF team helping. S/P PICC Line.     Problem/Plan - 2:  ·  Problem: Acute renal failure.  Plan: Creatinine better.  Renal following .     Problem/Plan - 3:  ·  Problem: Hyperkalemia/ Hypokalemia/ hypomagnesemia  .  Plan: Correcting.      Problem/Plan - 4:  ·  Problem: Hyponatremia.  Plan: Correcting . Management per renal.      Problem/Plan - 5:  ·  Problem:  Hypocalcemia.  Plan: Chronic . Replacing IV and PO ..      Problem/Plan - 6:  Problem:  Jaundice, hepatocellular. Plan: Likely from CHF . Monitoring LFT      Problem/Plan - 7:  ·  Problem: DM (diabetes mellitus).  Plan: SSI and Lantus .Sugars in good control.      Problem/Plan - 8:  ·  Problem: UTI .  Plan: IV Abxs for 5 days .     Problem/Plan - 9:  ·  Problem: Hypothyroidism.  Plan: Synthroid home dose and will check TFT in am.      Problem/Plan - 10:  Problem: Vaginal bleeding. Plan; GYn consult consulted and awaiting Endometrial BX so will reconsult GYN.       Problem/Plan - 11:  ·  Problem: Coagulopathy .  Plan: Sec to Liver injury. INR better. Holding DVT prophylaxis as vaginal bleeding.       Problem/Plan - 12:  ·  Problem: Feet pain sec to Diabetic Neuropathy  .  Plan: Increased  Neurontin. Will check B12 and Folate as well TFT.      Problem/Plan - 13:  ·  Problem: Mod to Severe MR and Severe TR .  Plan: Structural team following.

## 2018-11-12 NOTE — CONSULT NOTE ADULT - PROBLEM/RECOMMENDATION-1
DISPLAY PLAN FREE TEXT
[id number] Detailed

## 2018-11-12 NOTE — PROGRESS NOTE ADULT - ATTENDING COMMENTS
Patient seen and examined. Agree with note as above with addendum: agree with reduction in dose of calcitriol to home dose, would also reduce her calcium dose at that time. Dehydration likely altered her labs. She just had her synthroid adjusted by her outpatient endocrinologist, so would keep her on the same dose of synthroid.  Sue Mendosa MD  Pager: [phone]  Nights and Weekends: [phone] Patient seen and examined. Agree with note as above with addendum: agree with reduction in dose of calcitriol to home dose, would also reduce her calcium dose at that time. Dehydration likely altered her labs. She just had her synthroid adjusted by her outpatient endocrinologist, so would keep her on the same dose of synthroid. Will sign-off at this time. Re-consult as needed.  Sue Mendosa MD  Pager: [phone]  Nights and Weekends: [phone]

## 2018-11-12 NOTE — PROGRESS NOTE ADULT - SUBJECTIVE AND OBJECTIVE BOX
EP ATTENDING    tele: NSR, biv pacing    no palpitations, no syncope, no angina    aspirin  chewable 81 milliGRAM(s) Oral daily  buMETAnide Injectable 2 milliGRAM(s) IV Push daily  calcitriol   Capsule 0.5 MICROGram(s) Oral daily  calcium acetate 2001 milliGRAM(s) Oral three times a day with meals  calcium carbonate 1250 mG  + Vitamin D (OsCal 500 + D) 2 Tablet(s) Oral three times a day  cefpodoxime 200 milliGRAM(s) Oral every 12 hours  gabapentin 100 milliGRAM(s) Oral three times a day  influenza   Vaccine 0.5 milliLiter(s) IntraMuscular once  insulin lispro (HumaLOG) corrective regimen sliding scale   SubCutaneous three times a day before meals  insulin lispro (HumaLOG) corrective regimen sliding scale   SubCutaneous at bedtime  levothyroxine 175 MICROGram(s) Oral daily  milrinone Infusion 0.25 MICROgram(s)/kG/Min IV Continuous <Continuous>          11-12    134<L>  |  88<L>  |  16  ----------------------------<  120<H>  4.0   |  38<H>  |  0.86    Ca    8.6      12 Nov 2018 05:36  Mg     1.4     11-12        T(C): 37.1 (11-12-18 @ 04:00), Max: 37.1 (11-12-18 @ 04:00)  HR: 106 (11-12-18 @ 05:00) (97 - 115)  BP: 101/59 (11-12-18 @ 04:00) (101/59 - 117/77)  RR: 18 (11-12-18 @ 04:00) (18 - 18)  SpO2: 96% (11-12-18 @ 04:00) (96% - 99%)  Wt(kg): --    JVP 12  tachy, no murmurs  lungs with rales  soft nt/nd  + 1 edema    device interrogation normal  EKG: NSR, Biv pacing    A/P) She is a pleasant 64 y/o female PMH severe NICM (LVEF 10-15%) who had a [employer] Biv-ICD implanted at [place]. A recent LHC at [place] was also unremarkable. She now returns for with a severe decompensation of her NICM. Her device interrogation demonstrates excellent RA, RV and LV lead function. She denies palpitations nor high voltage therapy.     -continue lasix, f/u cardiology  -f/u CHF consult for advanced therapies (VAD vs transplant)  -no need for AAD  -Her biv-icd was recently optimized by me. Future options including turning off BiV pacing to see if she responds better. This can be addressed when she's euvolemic  -will follow

## 2018-11-12 NOTE — CONSULT NOTE ADULT - PROBLEM SELECTOR RECOMMENDATION 2
patient being followed by heart failure; not an LVAD or transplant candidate given hx of non compliance. s/p biv-aicd. patient not receptive to goals of care conversation.
-Patient with hx of PTC s/p thyroidectomy.   -can c/w levothyroxine 175mcg.  -check TSH and Ft4
likely due to pre-existing endometrial lesions and exacerbated in the setting of elevated INR, which would need to further explored with endometrial biopsy

## 2018-11-12 NOTE — PROGRESS NOTE ADULT - ATTENDING COMMENTS
Briefly, 62 year old F h/o HTN, HLD, DM II, thyroid cancer s/p thyroidectomy c/b hypocalcemia, HFrEF (LVEF 10%, LVIDd 6.6 cm; first diagnosed in 2013), s/p CRT-D placement in June 2017 with multiple recurrent admissions for cardiogenic shock requiring inotropes who presented with vaginal bleeding in setting of supratherapeutic INR which was 2/2 congestive hepatopathy. Was found to be likely low output heart failure and started on inotropes with diuretics with improvement. Follows with Dr. Robbins. On exam, JVD approx 12 cm, RRR, grade II/VI systolic murmur, S3, CTAB, nontender abdomen, trace edema. Labs reviewed - K 4, BUN/Cr 16/0.8, Ca 8.6. RHC on 5/25/18 which showed RA of 28, PCWP 37, PA 57/39/46 , EDP 23, PA sat 34.7%, CO 2.72, CI 1.21. TTE reviewed - EF 10%, LVEDD 6.6 cm, mod MR, severe TR. Overall stage D HF, NYHA class IV with plan for home inotropes via PICC as patient adamant about not being evaluated for LVAD currently.   - volume overloaded; increase bumex to 3 mg q12  - standing weights daily  - continue milrinone 0.25 mcg/kg/min  - will arrange close outpt f/u with Dr. Gallego who she would like to see when ready for discharge

## 2018-11-12 NOTE — PROGRESS NOTE ADULT - SUBJECTIVE AND OBJECTIVE BOX
No pain, no shortness of breath      VITAL:  T(C): , Max: 37.1 (11-12-18 @ 04:00)  T(F): , Max: 98.8 (11-12-18 @ 04:00)  HR: 106 (11-12-18 @ 05:00)  BP: 101/59 (11-12-18 @ 04:00)  RR: 18 (11-12-18 @ 04:00)  SpO2: 96% (11-12-18 @ 04:00)  urine output 1950cc/24h      PHYSICAL EXAM:  Constitutional: NAD, Alert  HEENT: NCAT, MMM  Neck: Supple, (+) large JVD  Respiratory: decreased BS b/l bases  Cardiovascular: RRR s1s2, no m/r/g  Gastrointestinal: BS+, soft, NT, (+)large distension  Extremities: 2-3+ b/l LE edema  Neurological: no focal deficits; strength grossly intact  Back: no CVAT b/l  Skin: No rashes, no nevi      LABS:    Na(134)/K(4.0)/Cl(88)/HCO3(38)/BUN(16)/Cr(0.86)Glu(120)/Ca(8.6)/Mg(1.4)/PO4(--)    11-12 @ 05:36  Na(134)/K(3.6)/Cl(87)/HCO3(35)/BUN(19)/Cr(0.78)Glu(156)/Ca(8.2)/Mg(--)/PO4(--)    11-11 @ 05:28  Na(133)/K(3.5)/Cl(86)/HCO3(35)/BUN(26)/Cr(0.96)Glu(113)/Ca(7.6)/Mg(1.8)/PO4(3.6)    11-10 @ 05:50  Na(131)/K(2.7)/Cl(83)/HCO3(34)/BUN(32)/Cr(0.94)Glu(176)/Ca(6.7)/Mg(1.7)/PO4(3.8)    11-09 @ 14:37  Na(129)/K(2.7)/Cl(83)/HCO3(32)/BUN(39)/Cr(1.07)Glu(139)/Ca(6.8)/Mg(2.1)/PO4(4.6)    11-09 @ 08:50      ASSESSMENT: 63F w/ HTN, DM2, thyroid CA s/p resection, acquired hypoparathyroidism, and severe HFrEF, 11/6/18 a/w vaginal bleeding/MAGNUS/electrolyte derangements/ acute on chronic HFrEF    (1)Renal - MAGNUS - Prerenally mediated from decompensated CHF. Now resolved.    (2)Hypokalemia - due to diuresis - effectively repleted    (3)Hypomagnesemia - due to diuresis - repleted this a.m.     (4)Bone - hypocalcemia/hyperphosphatemia due to hypoparathyroidism (s/p iatrogenic parathyroidectomy). Very chronic. Much improved relative to admission.    (5)Hyponatremia - due to decompensated CHF. Much improved since admission    (6)CV - slowly improving, with Primacor-assisted diuresis      RECOMMEND:  (1)Primacor + Bumex gtts per Cardiology  (2)Oscal/Phoslo/Calcitriol as ordered  (3)Mg++ repletion as ordered  (4)BMP+Mg+PO4 qd  (5)Dose new meds for GFR >60ml/min (present dosing is acceptable)            Brian Henry MD  [facility]  [phone] No pain, no shortness of breath      VITAL:  T(C): , Max: 37.1 (11-12-18 @ 04:00)  T(F): , Max: 98.8 (11-12-18 @ 04:00)  HR: 106 (11-12-18 @ 05:00)  BP: 101/59 (11-12-18 @ 04:00)  RR: 18 (11-12-18 @ 04:00)  SpO2: 96% (11-12-18 @ 04:00)  urine output 1950cc/24h      PHYSICAL EXAM:  Constitutional: NAD, Alert  HEENT: NCAT, MMM  Neck: Supple, (+) large JVD  Respiratory: decreased BS b/l bases  Cardiovascular: RRR s1s2, no m/r/g  Gastrointestinal: BS+, soft, NT, (+)large distension  Extremities: 2-3+ b/l LE edema  Neurological: no focal deficits; strength grossly intact  Back: no CVAT b/l  Skin: No rashes, no nevi      LABS:    Na(134)/K(4.0)/Cl(88)/HCO3(38)/BUN(16)/Cr(0.86)Glu(120)/Ca(8.6)/Mg(1.4)/PO4(--)    11-12 @ 05:36  Na(134)/K(3.6)/Cl(87)/HCO3(35)/BUN(19)/Cr(0.78)Glu(156)/Ca(8.2)/Mg(--)/PO4(--)    11-11 @ 05:28  Na(133)/K(3.5)/Cl(86)/HCO3(35)/BUN(26)/Cr(0.96)Glu(113)/Ca(7.6)/Mg(1.8)/PO4(3.6)    11-10 @ 05:50  Na(131)/K(2.7)/Cl(83)/HCO3(34)/BUN(32)/Cr(0.94)Glu(176)/Ca(6.7)/Mg(1.7)/PO4(3.8)    11-09 @ 14:37  Na(129)/K(2.7)/Cl(83)/HCO3(32)/BUN(39)/Cr(1.07)Glu(139)/Ca(6.8)/Mg(2.1)/PO4(4.6)    11-09 @ 08:50      ASSESSMENT: 63F w/ HTN, DM2, thyroid CA s/p resection, acquired hypoparathyroidism, and severe HFrEF, 11/6/18 a/w vaginal bleeding/MAGNUS/electrolyte derangements/ acute on chronic HFrEF    (1)Renal - MAGNUS - Prerenally mediated from decompensated CHF. Now resolved.    (2)Hypokalemia - due to diuresis - effectively repleted    (3)Hypomagnesemia - due to diuresis - repleted this a.m.     (4)Bone - hypocalcemia/hyperphosphatemia due to hypoparathyroidism (s/p iatrogenic parathyroidectomy). Very chronic. Much improved relative to admission.    (5)Hyponatremia - due to decompensated CHF. Much improved since admission    (6)CV - slowly improving, with Primacor-assisted diuresis. She was on Entresto prior to admission. There is no renal contraindication to adding it back.      RECOMMEND:  (1)Primacor + Bumex gtts per Cardiology  (2)Oscal/Phoslo/Calcitriol as ordered  (3)Mg++ repletion as ordered  (4)BMP+Mg+PO4 qd  (5)Dose new meds for GFR >60ml/min (present dosing is acceptable)  (6)No objection to adding back Entresto          Brian Henry MD  [facility]  [phone] Complains of b/l foot pain. No SOB.      VITAL:  T(C): , Max: 37.1 (11-12-18 @ 04:00)  T(F): , Max: 98.8 (11-12-18 @ 04:00)  HR: 106 (11-12-18 @ 05:00)  BP: 101/59 (11-12-18 @ 04:00)  RR: 18 (11-12-18 @ 04:00)  SpO2: 96% (11-12-18 @ 04:00)  urine output 1950cc/24h      PHYSICAL EXAM:  Constitutional: NAD, Alert  HEENT: NCAT, MMM  Neck: Supple, (+) JVD  Respiratory: CTA b/l  Cardiovascular: reg, tachy s1s2  Gastrointestinal: BS+, soft, NT, (+)mild distension  Extremities: 2+ b/l LE edema  Neurological: no focal deficits; strength grossly intact  Back: no CVAT b/l  Skin: No rashes, no nevi      LABS:    Na(134)/K(4.0)/Cl(88)/HCO3(38)/BUN(16)/Cr(0.86)Glu(120)/Ca(8.6)/Mg(1.4)/PO4(--)    11-12 @ 05:36  Na(134)/K(3.6)/Cl(87)/HCO3(35)/BUN(19)/Cr(0.78)Glu(156)/Ca(8.2)/Mg(--)/PO4(--)    11-11 @ 05:28  Na(133)/K(3.5)/Cl(86)/HCO3(35)/BUN(26)/Cr(0.96)Glu(113)/Ca(7.6)/Mg(1.8)/PO4(3.6)    11-10 @ 05:50  Na(131)/K(2.7)/Cl(83)/HCO3(34)/BUN(32)/Cr(0.94)Glu(176)/Ca(6.7)/Mg(1.7)/PO4(3.8)    11-09 @ 14:37  Na(129)/K(2.7)/Cl(83)/HCO3(32)/BUN(39)/Cr(1.07)Glu(139)/Ca(6.8)/Mg(2.1)/PO4(4.6)    11-09 @ 08:50      ASSESSMENT: 63F w/ HTN, DM2, thyroid CA s/p resection, acquired hypoparathyroidism, and severe HFrEF, 11/6/18 a/w vaginal bleeding/MAGNUS/electrolyte derangements/ acute on chronic HFrEF    (1)Renal - MAGNUS - Prerenally mediated from decompensated CHF. Now resolved.    (2)Hypokalemia - due to diuresis - effectively repleted    (3)Hypomagnesemia - due to diuresis - repleted this a.m.     (4)Bone - hypocalcemia/hyperphosphatemia due to hypoparathyroidism (s/p iatrogenic parathyroidectomy). Very chronic. Much improved relative to admission.    (5)Hyponatremia - due to decompensated CHF. Much improved since admission    (6)CV - slowly improving, with Primacor-assisted diuresis. She was on Entresto prior to admission. There is no renal contraindication to adding it back.      RECOMMEND:  (1)Primacor + Bumex gtts per Cardiology  (2)Oscal/Phoslo/Calcitriol as ordered  (3)Mg++ repletion as ordered  (4)BMP+Mg+PO4 qd  (5)Dose new meds for GFR >60ml/min (present dosing is acceptable)  (6)No objection to adding back Entresto          Brian Henry MD  [facility]  [phone]

## 2018-11-12 NOTE — PROGRESS NOTE ADULT - SUBJECTIVE AND OBJECTIVE BOX
Sub: Patient seen and examined at the bedside. Denies any complaints of CP or EDDY. Does reports continued B/L foot pain.     ROS otherwise negative.      MEDICATIONS  (STANDING):  aspirin  chewable 81 milliGRAM(s) Oral daily  buMETAnide Injectable 2 milliGRAM(s) IV Push daily  calcitriol   Capsule 0.5 MICROGram(s) Oral daily  calcium acetate 2001 milliGRAM(s) Oral three times a day with meals  calcium carbonate 1250 mG  + Vitamin D (OsCal 500 + D) 2 Tablet(s) Oral three times a day  cefpodoxime 200 milliGRAM(s) Oral every 12 hours  gabapentin 100 milliGRAM(s) Oral three times a day  influenza   Vaccine 0.5 milliLiter(s) IntraMuscular once  insulin lispro (HumaLOG) corrective regimen sliding scale   SubCutaneous three times a day before meals  insulin lispro (HumaLOG) corrective regimen sliding scale   SubCutaneous at bedtime  levothyroxine 175 MICROGram(s) Oral daily  milrinone Infusion 0.25 MICROgram(s)/kG/Min (7.26 mL/Hr) IV Continuous <Continuous>    MEDICATIONS  (PRN):      LABS:                        10.6   7.22  )-----------( 119      ( 12 Nov 2018 07:45 )             33.7     Hemoglobin: 10.6 g/dL (11-12 @ 07:45)  Hemoglobin: 10.3 g/dL (11-10 @ 08:06)  Hemoglobin: 10.6 g/dL (11-09 @ 06:50)  Hemoglobin: 10.9 g/dL (11-08 @ 04:04)  Hemoglobin: 11.1 g/dL (11-07 @ 13:01)    11-12    134<L>  |  88<L>  |  16  ----------------------------<  120<H>  4.0   |  38<H>  |  0.86    Ca    8.6      12 Nov 2018 05:36  Mg     1.4     11-12      Creatinine Trend: 0.86<--, 0.78<--, 0.96<--, 0.94<--, 1.07<--, 1.26<--   PT/INR - ( 12 Nov 2018 07:42 )   PT: 20.1 sec;   INR: 1.76 ratio         PHYSICAL EXAM  Vital Signs Last 24 Hrs  T(C): 37.1 (12 Nov 2018 04:00), Max: 37.1 (12 Nov 2018 04:00)  T(F): 98.8 (12 Nov 2018 04:00), Max: 98.8 (12 Nov 2018 04:00)  HR: 106 (12 Nov 2018 05:00) (106 - 115)  BP: 101/59 (12 Nov 2018 04:00) (101/59 - 108/73)  BP(mean): --  RR: 18 (12 Nov 2018 04:00) (18 - 18)  SpO2: 96% (12 Nov 2018 04:00) (96% - 99%)    	  Lymphatic: No lymphadenopathy + 2 pitting edema in LE bilaterally   Cardiovascular: Normal S1 S2,RRR, No murmurs , Peripheral pulses difficult to palpate  Respiratory: Lungs clear to auscultation, normal effort 	  Gastrointestinal:  Soft, distended   Skin: No rashes, No ecchymoses, No cyanosis,     TELEMETRY: 	      ECG: < from: 12 Lead ECG (11.06.18 @ 19:34) >  Atrial-sensed ventricular-paced rhythm  Biventricular pacemaker detected  ABNORMAL ECG  < end of copied text >        < from: Xray Chest 1 View- PORTABLE-Routine (11.08.18 @ 08:51) >    Impression:    The heart is enlarged. Small left pleural effusion. The right lung is   clear. A pacer is in good position. A central line is seen on the right   and the tip is in superior vena cava. No pneumothorax.  < end of copied text >      < from: TTE with Doppler (w/Cont) (11.07.18 @ 05:53) >  ------------------------------------------------------------------------  Conclusions:  1. Tethered mitral valve leaflets with normal opening.  Mitral annular calcification and thickened  mitral leaflet  tips Moderate mitral regurgitation.  2. Calcified trileaflet aortic valve with normal opening.  3. Moderately dilated left atrium.  LA volume index = 43  cc/m2.  4. Eccentric left ventricular hypertrophy (dilated left  ventricle with normal relative wall thickness).  5. Severe global left ventricularsystolic dysfunction.  Endocardial visualization enhanced with intravenous  injection of Ultrasonic Enhancing Agent (Definity). No LV  thrombus.  Septal flattening consistent with right  ventricular overload.  6. Severe diastolic dysfunction with elevated filling  pressures  7. Severe right atrial enlargement.  8. Right ventricular enlargement with decreased right  ventricular systolic function.  A device wire is noted in  the right heart.  9. Dilated tricuspid annulus with malcoaptation of  tricuspid leaflets. Severe tricuspid regurgitation.    < end of copied text >  	  ICD check 11/7/18   · Additional Procedure Details	call to interrogate device to check arrhythmias normal sensing and pacing thresholds , stable lead impedances AT AF burden <0.1 % ,optivol fluid index below impedence line indicating no fluid accumulation No stored data for review	  · Comments	3.8 years battery life	  · Underlying Rhythm	SR 80's, not pacemaker dependent	  · % Bi-V Paced	100%	  · Battery	Good	      ASSESSMENT/PLAN: 62 y/o F with PMHx of NICM s/p AICD, HTN, moderate-severe MR, DM, medication non-compliance who was admitted with volume overload and acute on chronic decomp CHF       -- Continue to maintain net negative.  -- TTE reveals severe LV dysfunction with moderate- severe MR and severe TR     - structural heart eval noted - re-evaulate for possible raul clip when euvolemic   -- lactate/LFTS/INR downtrending   -- continue to monitor pedal pulses  -- no urgent ischemic evaluation needed at this time  -- ICD interrogation with NSR    -- appreciate GYN consultation - eventual endometrial bx inpatient   -- heart failure recs noted -  at this time given patient's history of non compliance, she is a poor candidate for advanced therapies/transplant                        Adele Miller PA-C

## 2018-11-12 NOTE — CONSULT NOTE ADULT - PROBLEM SELECTOR PROBLEM 1
Cardiogenic shock
Elevated liver enzymes
Postmenopausal bleeding
Hypoparathyroidism
Non-rheumatic mitral regurgitation
Postmenopausal bleeding

## 2018-11-12 NOTE — PROGRESS NOTE ADULT - SUBJECTIVE AND OBJECTIVE BOX
Interval events: pt seen and examined. She denies abdominal pain, n/v. Tolerating PO well.     MEDICATIONS  (STANDING):  aspirin  chewable 81 milliGRAM(s) Oral daily  buMETAnide Injectable 2 milliGRAM(s) IV Push daily  calcitriol   Capsule 0.5 MICROGram(s) Oral daily  calcium acetate 2001 milliGRAM(s) Oral three times a day with meals  calcium carbonate 1250 mG  + Vitamin D (OsCal 500 + D) 2 Tablet(s) Oral three times a day  cefpodoxime 200 milliGRAM(s) Oral every 12 hours  gabapentin 200 milliGRAM(s) Oral three times a day  influenza   Vaccine 0.5 milliLiter(s) IntraMuscular once  insulin lispro (HumaLOG) corrective regimen sliding scale   SubCutaneous three times a day before meals  insulin lispro (HumaLOG) corrective regimen sliding scale   SubCutaneous at bedtime  levothyroxine 175 MICROGram(s) Oral daily  milrinone Infusion 0.25 MICROgram(s)/kG/Min (7.26 mL/Hr) IV Continuous <Continuous>    MEDICATIONS  (PRN):      Allergies    Isordil (Headache)  penicillin (Rash)    Intolerances        Review of Systems:    General:  No wt loss, fevers, chills, night sweats,fatigue,   Eyes:  Good vision, no reported pain  ENT:  No sore throat, pain, runny nose, dysphagia  CV:  No pain, palpitations, hypo/hypertension  Resp:  No dyspnea, cough, tachypnea, wheezing  GI:  No pain, No nausea, No vomiting, No diarrhea, No constipation, No weight loss, No fever, No pruritis, No rectal bleeding, No melena, No dysphagia  :  No pain, bleeding, incontinence, nocturia  Muscle:  No pain, weakness  Neuro:  No weakness, tingling, memory problems  Psych:  No fatigue, insomnia, mood problems, depression  Endocrine:  No polyuria, polydypsia, cold/heat intolerance  Heme:  No petechiae, ecchymosis, easy bruisability  Skin:  No rash, tattoos, scars, edema      Vital Signs Last 24 Hrs  T(C): 36.3 (12 Nov 2018 11:29), Max: 37.1 (12 Nov 2018 04:00)  T(F): 97.3 (12 Nov 2018 11:29), Max: 98.8 (12 Nov 2018 04:00)  HR: 80 (12 Nov 2018 11:29) (80 - 115)  BP: 104/64 (12 Nov 2018 11:29) (101/59 - 108/73)  BP(mean): --  RR: 17 (12 Nov 2018 11:29) (17 - 18)  SpO2: 97% (12 Nov 2018 11:29) (96% - 99%)    PHYSICAL EXAM:    Constitutional: NAD, well-developed  HEENT: EOMI, throat clear  Neck: No LAD, supple  Respiratory: CTA and P  Cardiovascular: S1 and S2, RRR, no M  Gastrointestinal: BS+, soft, NT/ND, neg HSM,  Extremities: No peripheral edema, neg clubing, cyanosis  Vascular: 2+ peripheral pulses  Neurological: A/O x 3, no focal deficits  Psychiatric: Normal mood, normal affect  Skin: No rashes      LABS:                        10.6   7.22  )-----------( 119      ( 12 Nov 2018 07:45 )             33.7     11-12    134<L>  |  88<L>  |  16  ----------------------------<  120<H>  4.0   |  38<H>  |  0.86    Ca    8.6      12 Nov 2018 05:36  Mg     1.4     11-12      PT/INR - ( 12 Nov 2018 07:42 )   PT: 20.1 sec;   INR: 1.76 ratio               RADIOLOGY & ADDITIONAL TESTS:

## 2018-11-12 NOTE — PROGRESS NOTE ADULT - SUBJECTIVE AND OBJECTIVE BOX
pt seen and examined, no complaints, ROS - .    aspirin  chewable 81 milliGRAM(s) Oral daily  buMETAnide Injectable 2 milliGRAM(s) IV Push daily  calcitriol   Capsule 0.5 MICROGram(s) Oral daily  calcium acetate 2001 milliGRAM(s) Oral three times a day with meals  calcium carbonate 1250 mG  + Vitamin D (OsCal 500 + D) 2 Tablet(s) Oral three times a day  cefpodoxime 200 milliGRAM(s) Oral every 12 hours  gabapentin 100 milliGRAM(s) Oral three times a day  influenza   Vaccine 0.5 milliLiter(s) IntraMuscular once  insulin lispro (HumaLOG) corrective regimen sliding scale   SubCutaneous three times a day before meals  insulin lispro (HumaLOG) corrective regimen sliding scale   SubCutaneous at bedtime  levothyroxine 175 MICROGram(s) Oral daily  milrinone Infusion 0.25 MICROgram(s)/kG/Min IV Continuous <Continuous>                            10.3   7.00  )-----------( 143      ( 10 Nov 2018 08:06 )             32.1       Hemoglobin: 10.3 g/dL (11-10 @ 08:06)  Hemoglobin: 10.6 g/dL (11-09 @ 06:50)  Hemoglobin: 10.9 g/dL (11-08 @ 04:04)  Hemoglobin: 11.1 g/dL (11-07 @ 13:01)      11-11    134<L>  |  87<L>  |  19  ----------------------------<  156<H>  3.6   |  35<H>  |  0.78    Ca    8.2<L>      11 Nov 2018 05:28  Phos  3.6     11-10  Mg     1.8     11-10    TPro  6.2  /  Alb  2.7<L>  /  TBili  7.3<H>  /  DBili  x   /  AST  48<H>  /  ALT  24  /  AlkPhos  301<H>  11-10    Creatinine Trend: 0.78<--, 0.96<--, 0.94<--, 1.07<--, 1.26<--, 1.83<--    COAGS: PT/INR - ( 11 Nov 2018 11:06 )   PT: 20.9 sec;   INR: 1.80 ratio         T(C): 37.1 (11-12-18 @ 04:00), Max: 37.1 (11-12-18 @ 04:00)  HR: 106 (11-12-18 @ 05:00) (97 - 115)  BP: 101/59 (11-12-18 @ 04:00) (101/59 - 117/77)  RR: 18 (11-12-18 @ 04:00) (18 - 18)  SpO2: 96% (11-12-18 @ 04:00) (96% - 99%)  Wt(kg): --    I&O's Summary    10 Nov 2018 07:01  -  11 Nov 2018 07:00  --------------------------------------------------------  IN: 900 mL / OUT: 3350 mL / NET: -2450 mL    11 Nov 2018 07:01  -  12 Nov 2018 05:38  --------------------------------------------------------  IN: 1047.6 mL / OUT: 1450 mL / NET: -402.4 mL    	  Lymphatic: No lymphadenopathy + pitting edema in LE bilaterally   Cardiovascular: Normal S1 S2,RRR, No murmurs , Peripheral pulses palpable 2+ bilaterally  Respiratory: Lungs clear to auscultation, normal effort 	  Gastrointestinal:  Soft, distended   Skin: No rashes, No ecchymoses, No cyanosis,       TELEMETRY:	    ECG: < from: 12 Lead ECG (11.06.18 @ 19:34) >  Atrial-sensed ventricular-paced rhythm  Biventricular pacemaker detected  ABNORMAL ECG    < end of copied text >    	  RADIOLOGY:   DIAGNOSTIC TESTING:  [ ] Echocardiogram: < from: Transthoracic Echocardiogram (08.23.18 @ 11:25) >  1. Tethered mitral valve leaflets with normal opening.  Moderate-severe mitral regurgitation.  2. Normal left ventricular internal dimensions and wall  thicknesses.  3. Severe global left ventricular systolic dysfunction.  4. Moderate right atrial enlargement.  A device wire is  noted in the right heart.  5. Right ventricular enlargement with decreased right  ventricular systolic function.  6. Normal tricuspid valve.  Severe tricuspid regurgitation.  7. Estimated pulmonary artery systolic pressure equals 24  mm Hg, assuming right atrial pressure equals 10  mm Hg,  consistent with normal pulmonary pressures.  *** Compared with echocardiogram of 6/29/2018, no  significant changes noted.    < end of copied text >    [ ]  Catheterization:  [ ] Stress Test:    OTHER: 	      ASSESSMENT/PLAN: 62 y/o F with PMHx of NICM s/p AICD, HTN, moderate-severe MR, DM, ? medication compliance who was admitted with volume overload and heart failure.    - cont inotropic assisted diuresis and bumex   -   GI / DVT prophylaxis,  keep K>4, mag >2.0   - heart failure follow up   - cont synthroid   - no further EPS work up needed at present .   D/W Dr Hallman pt seen and examined, no complaints, ROS - .    aspirin  chewable 81 milliGRAM(s) Oral daily  buMETAnide Injectable 2 milliGRAM(s) IV Push daily  calcitriol   Capsule 0.5 MICROGram(s) Oral daily  calcium acetate 2001 milliGRAM(s) Oral three times a day with meals  calcium carbonate 1250 mG  + Vitamin D (OsCal 500 + D) 2 Tablet(s) Oral three times a day  cefpodoxime 200 milliGRAM(s) Oral every 12 hours  gabapentin 100 milliGRAM(s) Oral three times a day  influenza   Vaccine 0.5 milliLiter(s) IntraMuscular once  insulin lispro (HumaLOG) corrective regimen sliding scale   SubCutaneous three times a day before meals  insulin lispro (HumaLOG) corrective regimen sliding scale   SubCutaneous at bedtime  levothyroxine 175 MICROGram(s) Oral daily  milrinone Infusion 0.25 MICROgram(s)/kG/Min IV Continuous <Continuous>                            10.3   7.00  )-----------( 143      ( 10 Nov 2018 08:06 )             32.1       Hemoglobin: 10.3 g/dL (11-10 @ 08:06)  Hemoglobin: 10.6 g/dL (11-09 @ 06:50)  Hemoglobin: 10.9 g/dL (11-08 @ 04:04)  Hemoglobin: 11.1 g/dL (11-07 @ 13:01)      11-11    134<L>  |  87<L>  |  19  ----------------------------<  156<H>  3.6   |  35<H>  |  0.78    Ca    8.2<L>      11 Nov 2018 05:28  Phos  3.6     11-10  Mg     1.8     11-10    TPro  6.2  /  Alb  2.7<L>  /  TBili  7.3<H>  /  DBili  x   /  AST  48<H>  /  ALT  24  /  AlkPhos  301<H>  11-10    Creatinine Trend: 0.78<--, 0.96<--, 0.94<--, 1.07<--, 1.26<--, 1.83<--    COAGS: PT/INR - ( 11 Nov 2018 11:06 )   PT: 20.9 sec;   INR: 1.80 ratio         T(C): 37.1 (11-12-18 @ 04:00), Max: 37.1 (11-12-18 @ 04:00)  HR: 106 (11-12-18 @ 05:00) (97 - 115)  BP: 101/59 (11-12-18 @ 04:00) (101/59 - 117/77)  RR: 18 (11-12-18 @ 04:00) (18 - 18)  SpO2: 96% (11-12-18 @ 04:00) (96% - 99%)  Wt(kg): --    I&O's Summary    10 Nov 2018 07:01  -  11 Nov 2018 07:00  --------------------------------------------------------  IN: 900 mL / OUT: 3350 mL / NET: -2450 mL    11 Nov 2018 07:01  -  12 Nov 2018 05:38  --------------------------------------------------------  IN: 1047.6 mL / OUT: 1450 mL / NET: -402.4 mL    	  Lymphatic: No lymphadenopathy + pitting edema in LE bilaterally   Cardiovascular: Normal S1 S2,RRR, No murmurs , Peripheral pulses palpable 2+ bilaterally  Respiratory: Lungs clear to auscultation, normal effort 	  Gastrointestinal:  Soft, distended   Skin: No rashes, No ecchymoses, No cyanosis,       TELEMETRY:	    ECG: < from: 12 Lead ECG (11.06.18 @ 19:34) >  Atrial-sensed ventricular-paced rhythm  Biventricular pacemaker detected  ABNORMAL ECG    < end of copied text >    	  RADIOLOGY:   DIAGNOSTIC TESTING:  [ ] Echocardiogram: < from: Transthoracic Echocardiogram (08.23.18 @ 11:25) >  1. Tethered mitral valve leaflets with normal opening.  Moderate-severe mitral regurgitation.  2. Normal left ventricular internal dimensions and wall  thicknesses.  3. Severe global left ventricular systolic dysfunction.  4. Moderate right atrial enlargement.  A device wire is  noted in the right heart.  5. Right ventricular enlargement with decreased right  ventricular systolic function.  6. Normal tricuspid valve.  Severe tricuspid regurgitation.  7. Estimated pulmonary artery systolic pressure equals 24  mm Hg, assuming right atrial pressure equals 10  mm Hg,  consistent with normal pulmonary pressures.  *** Compared with echocardiogram of 6/29/2018, no  significant changes noted.    < end of copied text >    [ ]  Catheterization:  [ ] Stress Test:    OTHER: 	      ASSESSMENT/PLAN: 62 y/o F with PMHx of NICM s/p AICD, HTN, moderate-severe MR, DM, ? medication compliance who was admitted with volume overload and heart failure.    - cont inotropic assisted diuresis and bumex   -   GI / DVT prophylaxis,  keep K>4, mag >2.0   - cont synthroid   - Strict I+O's, keep net negative  - D/w Dr. Grimes

## 2018-11-12 NOTE — CONSULT NOTE ADULT - ASSESSMENT
62yo post-menopausal F w/ pmh of CHF, thyroid ca, HTN, DM, daily ASA user,  c/o vaginal bleeding; also was in CCU for acute decompensated heart failure with EF 10%. Palliative care consulted for GOC.

## 2018-11-12 NOTE — PROGRESS NOTE ADULT - PROBLEM SELECTOR PLAN 1
suspect congestive hepatopathy   prior imaging not impressive  serologic workup negative  LFT's continue to trend downward  complete workup would include transjugular liver biopsy with pressure measurement  Monitor LFT's daily; trending down

## 2018-11-13 LAB
ANION GAP SERPL CALC-SCNC: 12 MMOL/L — SIGNIFICANT CHANGE UP (ref 5–17)
BUN SERPL-MCNC: 14 MG/DL — SIGNIFICANT CHANGE UP (ref 7–23)
CALCIUM SERPL-MCNC: 8.3 MG/DL — LOW (ref 8.4–10.5)
CHLORIDE SERPL-SCNC: 85 MMOL/L — LOW (ref 96–108)
CO2 SERPL-SCNC: 36 MMOL/L — HIGH (ref 22–31)
CREAT SERPL-MCNC: 0.73 MG/DL — SIGNIFICANT CHANGE UP (ref 0.5–1.3)
GLUCOSE BLDC GLUCOMTR-MCNC: 117 MG/DL — HIGH (ref 70–99)
GLUCOSE BLDC GLUCOMTR-MCNC: 188 MG/DL — HIGH (ref 70–99)
GLUCOSE BLDC GLUCOMTR-MCNC: 196 MG/DL — HIGH (ref 70–99)
GLUCOSE BLDC GLUCOMTR-MCNC: 197 MG/DL — HIGH (ref 70–99)
GLUCOSE SERPL-MCNC: 101 MG/DL — HIGH (ref 70–99)
HCT VFR BLD CALC: 32.3 % — LOW (ref 34.5–45)
HGB BLD-MCNC: 10.5 G/DL — LOW (ref 11.5–15.5)
MCHC RBC-ENTMCNC: 22.9 PG — LOW (ref 27–34)
MCHC RBC-ENTMCNC: 32.5 GM/DL — SIGNIFICANT CHANGE UP (ref 32–36)
MCV RBC AUTO: 70.5 FL — LOW (ref 80–100)
PLATELET # BLD AUTO: 104 K/UL — LOW (ref 150–400)
POTASSIUM SERPL-MCNC: 3.2 MMOL/L — LOW (ref 3.5–5.3)
POTASSIUM SERPL-SCNC: 3.2 MMOL/L — LOW (ref 3.5–5.3)
RBC # BLD: 4.58 M/UL — SIGNIFICANT CHANGE UP (ref 3.8–5.2)
RBC # FLD: 25.5 % — HIGH (ref 10.3–14.5)
SODIUM SERPL-SCNC: 133 MMOL/L — LOW (ref 135–145)
WBC # BLD: 6.67 K/UL — SIGNIFICANT CHANGE UP (ref 3.8–10.5)
WBC # FLD AUTO: 6.67 K/UL — SIGNIFICANT CHANGE UP (ref 3.8–10.5)

## 2018-11-13 PROCEDURE — 99233 SBSQ HOSP IP/OBS HIGH 50: CPT | Mod: GC

## 2018-11-13 RX ORDER — POTASSIUM CHLORIDE 20 MEQ
40 PACKET (EA) ORAL EVERY 4 HOURS
Qty: 0 | Refills: 0 | Status: COMPLETED | OUTPATIENT
Start: 2018-11-13 | End: 2018-11-13

## 2018-11-13 RX ADMIN — GABAPENTIN 200 MILLIGRAM(S): 400 CAPSULE ORAL at 05:46

## 2018-11-13 RX ADMIN — Medication 40 MILLIEQUIVALENT(S): at 17:32

## 2018-11-13 RX ADMIN — Medication 200 MILLIGRAM(S): at 05:43

## 2018-11-13 RX ADMIN — CALCITRIOL 0.5 MICROGRAM(S): 0.5 CAPSULE ORAL at 11:20

## 2018-11-13 RX ADMIN — Medication 81 MILLIGRAM(S): at 11:20

## 2018-11-13 RX ADMIN — Medication 2001 MILLIGRAM(S): at 11:17

## 2018-11-13 RX ADMIN — GABAPENTIN 200 MILLIGRAM(S): 400 CAPSULE ORAL at 21:30

## 2018-11-13 RX ADMIN — BUMETANIDE 124 MILLIGRAM(S): 0.25 INJECTION INTRAMUSCULAR; INTRAVENOUS at 05:43

## 2018-11-13 RX ADMIN — Medication 200 MILLIGRAM(S): at 17:33

## 2018-11-13 RX ADMIN — Medication 1: at 16:54

## 2018-11-13 RX ADMIN — Medication 175 MICROGRAM(S): at 05:43

## 2018-11-13 RX ADMIN — BUMETANIDE 124 MILLIGRAM(S): 0.25 INJECTION INTRAMUSCULAR; INTRAVENOUS at 17:34

## 2018-11-13 RX ADMIN — MILRINONE LACTATE 7.26 MICROGRAM(S)/KG/MIN: 1 INJECTION, SOLUTION INTRAVENOUS at 23:01

## 2018-11-13 RX ADMIN — Medication 2001 MILLIGRAM(S): at 17:32

## 2018-11-13 RX ADMIN — Medication 2001 MILLIGRAM(S): at 13:22

## 2018-11-13 RX ADMIN — Medication 2 TABLET(S): at 13:22

## 2018-11-13 RX ADMIN — Medication 40 MILLIEQUIVALENT(S): at 13:22

## 2018-11-13 RX ADMIN — Medication 1: at 12:17

## 2018-11-13 RX ADMIN — GABAPENTIN 200 MILLIGRAM(S): 400 CAPSULE ORAL at 13:22

## 2018-11-13 RX ADMIN — Medication 2 TABLET(S): at 05:46

## 2018-11-13 NOTE — PROGRESS NOTE ADULT - SUBJECTIVE AND OBJECTIVE BOX
INTERVAL HPI/OVERNIGHT EVENTS:No new concerns as foot pain better now.   Vital Signs Last 24 Hrs  T(C): 36.6 (13 Nov 2018 12:25), Max: 37.2 (13 Nov 2018 04:27)  T(F): 97.8 (13 Nov 2018 12:25), Max: 99 (13 Nov 2018 04:27)  HR: 95 (13 Nov 2018 12:25) (95 - 110)  BP: 101/68 (13 Nov 2018 12:25) (101/68 - 120/80)  BP(mean): --  RR: 18 (13 Nov 2018 12:25) (18 - 18)  SpO2: 99% (13 Nov 2018 12:25) (95% - 99%)  I&O's Summary    12 Nov 2018 07:01  -  13 Nov 2018 07:00  --------------------------------------------------------  IN: 1399.8 mL / OUT: 2200 mL / NET: -800.2 mL    13 Nov 2018 07:01  -  13 Nov 2018 12:29  --------------------------------------------------------  IN: 300 mL / OUT: 0 mL / NET: 300 mL      MEDICATIONS  (STANDING):  aspirin  chewable 81 milliGRAM(s) Oral daily  buMETAnide IVPB 3 milliGRAM(s) IV Intermittent every 12 hours  calcitriol   Capsule 0.5 MICROGram(s) Oral daily  calcium acetate 2001 milliGRAM(s) Oral three times a day with meals  calcium carbonate 1250 mG  + Vitamin D (OsCal 500 + D) 2 Tablet(s) Oral three times a day  cefpodoxime 200 milliGRAM(s) Oral every 12 hours  gabapentin 200 milliGRAM(s) Oral three times a day  influenza   Vaccine 0.5 milliLiter(s) IntraMuscular once  insulin lispro (HumaLOG) corrective regimen sliding scale   SubCutaneous three times a day before meals  insulin lispro (HumaLOG) corrective regimen sliding scale   SubCutaneous at bedtime  levothyroxine 175 MICROGram(s) Oral daily  milrinone Infusion 0.25 MICROgram(s)/kG/Min (7.26 mL/Hr) IV Continuous <Continuous>  potassium chloride    Tablet ER 40 milliEquivalent(s) Oral every 4 hours    MEDICATIONS  (PRN):    LABS:                        10.5   6.67  )-----------( 104      ( 13 Nov 2018 07:59 )             32.3     11-13    133<L>  |  85<L>  |  14  ----------------------------<  101<H>  3.2<L>   |  36<H>  |  0.73    Ca    8.3<L>      13 Nov 2018 06:24  Mg     1.4     11-12      PT/INR - ( 12 Nov 2018 07:42 )   PT: 20.1 sec;   INR: 1.76 ratio             CAPILLARY BLOOD GLUCOSE      POCT Blood Glucose.: 196 mg/dL (13 Nov 2018 12:05)  POCT Blood Glucose.: 117 mg/dL (13 Nov 2018 08:07)  POCT Blood Glucose.: 170 mg/dL (12 Nov 2018 21:23)  POCT Blood Glucose.: 180 mg/dL (12 Nov 2018 16:46)          REVIEW OF SYSTEMS:  CONSTITUTIONAL: No fever, weight loss, or fatigue  EYES: No eye pain, visual disturbances, or discharge  ENMT:  No difficulty hearing, tinnitus, vertigo; No sinus or throat pain  NECK: No pain or stiffness  RESPIRATORY: No cough, wheezing, chills or hemoptysis; No shortness of breath  CARDIOVASCULAR: No chest pain, palpitations, dizziness, or leg swelling  GASTROINTESTINAL: No abdominal or epigastric pain. No nausea, vomiting, or hematemesis; No diarrhea or constipation. No melena or hematochezia.  GENITOURINARY: No dysuria, frequency, hematuria, or incontinence  NEUROLOGICAL: No headaches, memory loss, loss of strength, numbness, or tremors    Consultant(s) Notes Reviewed:  [x ] YES  [ ] NO    PHYSICAL EXAM:  GENERAL: NAD, well-groomed, well-developed, not in any distress ,  HEAD:  Atraumatic, Normocephalic  NECK: Supple, No JVD, Normal thyroid  NERVOUS SYSTEM:  Alert & Oriented X3, No focal deficit   CHEST/LUNG: Good air entry bilateral with no  rales, rhonchi, wheezing, or rubs  HEART: Regular rate and rhythm; No murmurs, rubs, or gallops  ABDOMEN: Soft, Nontender, Nondistended; Bowel sounds present  EXTREMITIES:  2+ Peripheral Pulses, No clubbing, cyanosis, but less edema    Care Discussed with Consultants/Other Providers [ x] YES  [ ] NO

## 2018-11-13 NOTE — PHYSICAL THERAPY INITIAL EVALUATION ADULT - PRECAUTIONS/LIMITATIONS, REHAB EVAL
t has no known uterine pathologies and has not seen a GYN in 15-20 years. Currently Pt reports fatigue and daughter states she has been in this state for the past couple weeks since she was discharged from hospital for CHF exacerbation. CT A/P:Unchanged endometrial thickening and endometrial free fluid. 2 high density foci measuring approximately 1.5 cm may represent small fibroids. TTE: Tethered mitral valve leaflets with normal opening. Mitral annular calcification and thickened  mitral leaflet tips. Moderate mitral regurgitation. Calcified trileaflet aortic valve with normal opening. Severe global left ventricular systolic dysfunction. Pt has no known uterine pathologies and has not seen a GYN in 15-20 years. Currently Pt reports fatigue and daughter states she has been in this state for the past couple weeks since she was discharged from hospital for CHF exacerbation. CT A/P:Unchanged endometrial thickening and endometrial free fluid. 2 high density foci measuring approximately 1.5 cm may represent small fibroids. TTE: Tethered mitral valve leaflets with normal opening. Mitral annular calcification and thickened  mitral leaflet tips. Moderate mitral regurgitation. Calcified trileaflet aortic valve with normal opening. Severe global left ventricular systolic dysfunction.

## 2018-11-13 NOTE — PROGRESS NOTE ADULT - SUBJECTIVE AND OBJECTIVE BOX
Interval events: pt seen and examined. She denies abdominal pain, n/v. Tolerating PO well.     MEDICATIONS  (STANDING):  aspirin  chewable 81 milliGRAM(s) Oral daily  buMETAnide IVPB 3 milliGRAM(s) IV Intermittent every 12 hours  calcitriol   Capsule 0.5 MICROGram(s) Oral daily  calcium acetate 2001 milliGRAM(s) Oral three times a day with meals  calcium carbonate 1250 mG  + Vitamin D (OsCal 500 + D) 2 Tablet(s) Oral three times a day  cefpodoxime 200 milliGRAM(s) Oral every 12 hours  gabapentin 200 milliGRAM(s) Oral three times a day  influenza   Vaccine 0.5 milliLiter(s) IntraMuscular once  insulin lispro (HumaLOG) corrective regimen sliding scale   SubCutaneous three times a day before meals  insulin lispro (HumaLOG) corrective regimen sliding scale   SubCutaneous at bedtime  levothyroxine 175 MICROGram(s) Oral daily  milrinone Infusion 0.25 MICROgram(s)/kG/Min (7.26 mL/Hr) IV Continuous <Continuous>    MEDICATIONS  (PRN):      Allergies    Isordil (Headache)  penicillin (Rash)    Intolerances        Review of Systems:    General:  No wt loss, fevers, chills, night sweats,fatigue,   Eyes:  Good vision, no reported pain  ENT:  No sore throat, pain, runny nose, dysphagia  CV:  No pain, palpitations, hypo/hypertension  Resp:  No dyspnea, cough, tachypnea, wheezing  GI:  No pain, No nausea, No vomiting, No diarrhea, No constipation, No weight loss, No fever, No pruritis, No rectal bleeding, No melena, No dysphagia  :  No pain, bleeding, incontinence, nocturia  Muscle:  No pain, weakness  Neuro:  No weakness, tingling, memory problems  Psych:  No fatigue, insomnia, mood problems, depression  Endocrine:  No polyuria, polydypsia, cold/heat intolerance  Heme:  No petechiae, ecchymosis, easy bruisability  Skin:  No rash, tattoos, scars, edema      Vital Signs Last 24 Hrs  T(C): 37.2 (13 Nov 2018 04:27), Max: 37.2 (13 Nov 2018 04:27)  T(F): 99 (13 Nov 2018 04:27), Max: 99 (13 Nov 2018 04:27)  HR: 105 (13 Nov 2018 04:27) (80 - 110)  BP: 102/67 (13 Nov 2018 04:27) (102/67 - 120/80)  BP(mean): --  RR: 18 (13 Nov 2018 04:27) (17 - 18)  SpO2: 95% (13 Nov 2018 04:27) (95% - 98%)    PHYSICAL EXAM:    Constitutional: NAD, well-developed  HEENT: EOMI, throat clear  Neck: No LAD, supple  Respiratory: CTA and P  Cardiovascular: S1 and S2, RRR, no M  Gastrointestinal: BS+, soft, NT/ND, neg HSM,  Extremities: No peripheral edema, neg clubing, cyanosis  Vascular: 2+ peripheral pulses  Neurological: A/O x 3, no focal deficits  Psychiatric: Normal mood, normal affect  Skin: No rashes      LABS:                        10.5   6.67  )-----------( 104      ( 13 Nov 2018 07:59 )             32.3     11-13    133<L>  |  85<L>  |  14  ----------------------------<  101<H>  3.2<L>   |  36<H>  |  0.73    Ca    8.3<L>      13 Nov 2018 06:24  Mg     1.4     11-12      PT/INR - ( 12 Nov 2018 07:42 )   PT: 20.1 sec;   INR: 1.76 ratio               RADIOLOGY & ADDITIONAL TESTS:

## 2018-11-13 NOTE — PHYSICAL THERAPY INITIAL EVALUATION ADULT - PERTINENT HX OF CURRENT PROBLEM, REHAB EVAL
Pt is a 62 y/o female admitted to [facility] on 11/6/18 post-menopausal F w/ pmh of CHF, thyroid ca, HTN, DM, daily ASA user,  c/o vaginal bleeding that she noticed this morning. Pt states she woke up feeling fatigued with abdominal pressure and that when she sat down, she noticed that she had bled through her underwear. Pt has not experienced any bleeding episodes like this in the past.

## 2018-11-13 NOTE — PHYSICAL THERAPY INITIAL EVALUATION ADULT - PLANNED THERAPY INTERVENTIONS, PT EVAL
transfer training/gait training/bed mobility training/stair negotiation: GOAL: Pt will be able to negotiate 10 steps +HR independently with reciprocal pattern in 2 weeks.

## 2018-11-13 NOTE — PROGRESS NOTE ADULT - SUBJECTIVE AND OBJECTIVE BOX
Subjective: No acute events overnight. Pt reports that she is feeling well and does not require further titration of her medical therapy.     Medications:  aspirin  chewable 81 milliGRAM(s) Oral daily  buMETAnide IVPB 3 milliGRAM(s) IV Intermittent every 12 hours  calcitriol   Capsule 0.5 MICROGram(s) Oral daily  calcium acetate 2001 milliGRAM(s) Oral three times a day with meals  calcium carbonate 1250 mG  + Vitamin D (OsCal 500 + D) 2 Tablet(s) Oral three times a day  cefpodoxime 200 milliGRAM(s) Oral every 12 hours  gabapentin 200 milliGRAM(s) Oral three times a day  influenza   Vaccine 0.5 milliLiter(s) IntraMuscular once  insulin lispro (HumaLOG) corrective regimen sliding scale   SubCutaneous three times a day before meals  insulin lispro (HumaLOG) corrective regimen sliding scale   SubCutaneous at bedtime  levothyroxine 175 MICROGram(s) Oral daily  milrinone Infusion 0.25 MICROgram(s)/kG/Min IV Continuous <Continuous>  potassium chloride    Tablet ER 40 milliEquivalent(s) Oral every 4 hours    Vitals:  T(C): 36.6 (18 @ 12:25), Max: 37.2 (18 @ 04:27)  HR: 106 (18 @ 16:12) (95 - 110)  BP: 108/70 (18 @ 16:12) (101/68 - 120/80)  RR: 18 (18 @ 12:25) (18 - 18)  SpO2: 100% (18 @ 13:21) (95% - 100%)    Daily Weight in k.1 (2018 14:25)        I&O's Summary    2018 07:  -  2018 07:00  --------------------------------------------------------  IN: 1399.8 mL / OUT: 2200 mL / NET: -800.2 mL    2018 07:  -  2018 16:46  --------------------------------------------------------  IN: 480 mL / OUT: 900 mL / NET: -420 mL        Physical Exam:  Appearance: No Acute Distress  HEENT: JVP mildly elevated   Cardiovascular: RRR, Normal S1 S2 with S3 gallop   Respiratory: Clear to auscultation bilaterally  Gastrointestinal: Soft, Non-tender, non-distended	  Skin: no skin lesions  Neurologic: Non-focal  Extremities: No LE edema, warm and well perfused  Psychiatry: A & O x 3, Mood & affect appropriate      Labs:                        10.5   6.67  )-----------( 104      ( 2018 07:59 )             32.3     11-13    133<L>  |  85<L>  |  14  ----------------------------<  101<H>  3.2<L>   |  36<H>  |  0.73    Ca    8.3<L>      2018 06:24  Mg     1.4     -12        PT/INR - ( 2018 07:42 )   PT: 20.1 sec;   INR: 1.76 ratio        Serum Pro-Brain Natriuretic Peptide: 2414 pg/mL ( @ 04:04)    TELEMETRY: Sinus Rhythm, Intermittently V-Paced, PVCs

## 2018-11-13 NOTE — DIETITIAN INITIAL EVALUATION ADULT. - NS AS NUTRI INTERV ED CONTENT
Purpose of the nutrition education/provided diet education for heart failure nutrition therapy. discussed importance of avoiding salt and high sodium containing foods. patient made aware of need to monitor and restrict fluid intake, and importance/purpose of daily weights. Also provided brief diet education on carbohydrate consistent diet. Education included reviewing sources of carbohydrates, portion sizes, pairing protein with carbohydrates, limiting sugar sweetened beverages in diet such as fruit juice and the importance of consistent eating pattern to help optimize glycemic control.

## 2018-11-13 NOTE — DIETITIAN INITIAL EVALUATION ADULT. - ADHERENCE
Follows low sodium diet, weighs herself daily. Avoids soda and concentrated sweets, only has carrot juice or fruit juice. Checks BG 1x per with value 151-160mg/dL, HbA2c: 6% suggesting good glycemic control. Takes  Tradjenta

## 2018-11-13 NOTE — PROGRESS NOTE ADULT - ATTENDING COMMENTS
Briefly, 62 year old F h/o HTN, HLD, DM II, thyroid cancer s/p thyroidectomy c/b hypocalcemia, HFrEF (LVEF 10%, LVIDd 6.6 cm; first diagnosed in 2013), s/p CRT-D placement in June 2017 with multiple recurrent admissions for cardiogenic shock requiring inotropes who presented with vaginal bleeding in setting of supratherapeutic INR which was 2/2 congestive hepatopathy. Was found to be likely low output heart failure and started on inotropes with diuretics with improvement. Follows with Dr. Robbins. On exam, JVD approx 12 cm, RRR, grade II/VI systolic murmur, S3, CTAB, nontender abdomen, trace edema. Labs reviewed - K 3.2, BUN/Cr 14/0.73, K 3.2, TBili 7.3 (downtrending), Ca 8.6. RHC on 5/25/18 which showed RA of 28, PCWP 37, PA 57/39/46 , EDP 23, PA sat 34.7%, CO 2.72, CI 1.21. TTE reviewed - EF 10%, LVEDD 6.6 cm, mod MR, severe TR (malcoaptation). Overall stage D HF, NYHA class IV with plan for home inotropes via PICC as patient adamant about not being evaluated for LVAD currently.   - volume overloaded; continue bumex 3 mg q12  - start marcel 25 mg daily  - standing weights daily  - ideally would increase milrinone to 0.375 mcg/kg/min; will defer for now  - will arrange close outpt f/u with Dr. Gallego who she would like to see when ready for discharge  - possible d/c Thursday

## 2018-11-13 NOTE — DIETITIAN INITIAL EVALUATION ADULT. - NS AS NUTRI INTERV MEALS SNACK
General/healthful diet/Continue current diet as tolerated. Consider removing phosphrous restriction and adding low sodium diet restriction.

## 2018-11-13 NOTE — PROGRESS NOTE ADULT - SUBJECTIVE AND OBJECTIVE BOX
NEPHROLOGY - NSN [phone]    Patient found NAD in bed. New complaints: none    MEDS:  MEDICATIONS  (STANDING):  aspirin  chewable 81 milliGRAM(s) Oral daily  buMETAnide IVPB 3 milliGRAM(s) IV Intermittent every 12 hours  calcitriol   Capsule 0.5 MICROGram(s) Oral daily  calcium acetate 2001 milliGRAM(s) Oral three times a day with meals  calcium carbonate 1250 mG  + Vitamin D (OsCal 500 + D) 2 Tablet(s) Oral three times a day  cefpodoxime 200 milliGRAM(s) Oral every 12 hours  gabapentin 200 milliGRAM(s) Oral three times a day  influenza   Vaccine 0.5 milliLiter(s) IntraMuscular once  insulin lispro (HumaLOG) corrective regimen sliding scale   SubCutaneous three times a day before meals  insulin lispro (HumaLOG) corrective regimen sliding scale   SubCutaneous at bedtime  levothyroxine 175 MICROGram(s) Oral daily  milrinone Infusion 0.25 MICROgram(s)/kG/Min (7.26 mL/Hr) IV Continuous <Continuous>  potassium chloride    Tablet ER 40 milliEquivalent(s) Oral every 4 hours      VITAL:  T(C): , Max: 37.2 (11-13-18 @ 04:27)  T(F): , Max: 99 (11-13-18 @ 04:27)  HR: 105 (11-13-18 @ 04:27)  BP: 102/67 (11-13-18 @ 04:27)  BP(mean): --  RR: 18 (11-13-18 @ 04:27)  SpO2: 95% (11-13-18 @ 04:27)  Wt(kg): --    I/Os:    11-12-18 @ 07:01  -  11-13-18 @ 07:00  --------------------------------------------------------  IN: 1399.8 mL / OUT: 2200 mL / NET: -800.2 mL    11-13-18 @ 07:01  -  11-13-18 @ 11:28  --------------------------------------------------------  IN: 300 mL / OUT: 0 mL / NET: 300 mL            PHYSICAL EXAM:    Constitutional: NAD  HEENT:  NCAT; OP clear  Neck: No JVD; supple  Chest: CTA B/L  Cardiac: S1 S2 no rub  Gastrointestinal: BS+, soft, NT/ND  Extremities: +2 peripheral edema      LABS:                        10.5   6.67  )-----------( 104      ( 13 Nov 2018 07:59 )             32.3     11-13    133<L>  |  85<L>  |  14  ----------------------------<  101<H>  3.2<L>   |  36<H>  |  0.73    Ca    8.3<L>      13 Nov 2018 06:24  Mg     1.4     11-12      Na(133)/K(3.2)/Cl(85)/HCO3(36)/BUN(14)/Cr(0.73)Glu(101)/Ca(8.3)/Mg(--)/PO4(--)    11-13 @ 06:24  Na(134)/K(4.0)/Cl(88)/HCO3(38)/BUN(16)/Cr(0.86)Glu(120)/Ca(8.6)/Mg(1.4)/PO4(--)    11-12 @ 05:36  Na(134)/K(3.6)/Cl(87)/HCO3(35)/BUN(19)/Cr(0.78)Glu(156)/Ca(8.2)/Mg(--)/PO4(--)    11-11 @ 05:28    ASSESSMENT: 63F w/ HTN, DM2, thyroid CA s/p resection, acquired hypoparathyroidism, and severe HFrEF, 11/6/18 a/w vaginal bleeding/MAGNUS/electrolyte derangements/ acute on chronic HFrEF    (1)Renal - MAGNUS - Prerenally mediated from decompensated CHF. Now resolved.    (2)Hypokalemia - due to diuresis - cont to replete PRN and liberalize K in diet    (3)Hypomagnesemia - due to diuresis - cont to monitor    (4)Bone - hypocalcemia/hyperphosphatemia due to hypoparathyroidism (s/p iatrogenic parathyroidectomy). Very chronic. Much improved relative to admission.    (5)Hyponatremia - due to decompensated CHF. Much improved since admission    (6)CV - slowly improving, with Primacor-assisted diuresis. She was on Entresto prior to admission. There is no renal contraindication to adding it back.      RECOMMEND:  (1)Primacor + Bumex per Cardiology  (2)Oscal/Phoslo/Calcitriol as ordered  (3)Mg++ repletion PRN  (4)BMP+Mg+PO4 qd  (5)Dose new meds for GFR 60ml/min   (6)Replete K -- Kcl 40mEq pox2 ordered, Liberalize K in diet        Jayce Maciel DO, URIEL, FACP, FASN, CHFP, CPE  [facility]  [phone]

## 2018-11-13 NOTE — PROGRESS NOTE ADULT - PROBLEM SELECTOR PLAN 1
continue Milrinone 0.25 and diuresis with Bumetanide 3 mg IV BID   - start Spironolactone 25 mg PO Daily   will consider BB therapy when euvolemic  discussed the possible need for advanced therapies and encouraged outpatient follow up in HF Clinic

## 2018-11-13 NOTE — PHYSICAL THERAPY INITIAL EVALUATION ADULT - GAIT DEVIATIONS NOTED, PT EVAL
decreased juwan/decreased weight-shifting ability/decreased velocity of limb motion/decreased step length

## 2018-11-13 NOTE — DIETITIAN INITIAL EVALUATION ADULT. - OTHER INFO
Patient seen for Length Of Stay on 4MON. Pt reports UBW as 200lbs, notes weight gain due to fluid retention. Current dosing weight noted as ~213lbs (11/7), standing weight today noted as 201lbs. Per most recent RD note, pt noted with wt of 216lbs (8/22/2018) during this time pt was admitted with CHF exacerbation. Since admission pt reports good PO intake, completing at least 75% of meals. Denies any acute GI distress. Last BM 11/12.

## 2018-11-13 NOTE — PROGRESS NOTE ADULT - PROBLEM SELECTOR PLAN 1
suspect congestive hepatopathy   prior imaging not impressive  serologic workup negative  complete workup would include transjugular liver biopsy with pressure measurement  Monitor LFT's daily; trending down

## 2018-11-13 NOTE — DIETITIAN INITIAL EVALUATION ADULT. - ORAL INTAKE PTA
good/Pt reports overall good PO intake and appetite PTA, does note decreased appetite occasionally. Consumes 3 meals per day consisting of a variety of different foods. Confirms NKFA. Takes vitamin D, calcium and vitamin E supplements. Pt denies chewing/swallowing difficulty, nausea, vomiting, diarrhea, constipation.

## 2018-11-13 NOTE — PHYSICAL THERAPY INITIAL EVALUATION ADULT - ADDITIONAL COMMENTS
Pt lives in a private house with dtr with one flight of steps inside. Pt was Ind with all ADLs, amb with a tripod cane

## 2018-11-13 NOTE — PROGRESS NOTE ADULT - SUBJECTIVE AND OBJECTIVE BOX
EP ATTENDING    tele: NSR, biv pacing    no palpitations, no syncope, no angina    aspirin  chewable 81 milliGRAM(s) Oral daily  buMETAnide IVPB 3 milliGRAM(s) IV Intermittent every 12 hours  calcitriol   Capsule 0.5 MICROGram(s) Oral daily  calcium acetate 2001 milliGRAM(s) Oral three times a day with meals  calcium carbonate 1250 mG  + Vitamin D (OsCal 500 + D) 2 Tablet(s) Oral three times a day  cefpodoxime 200 milliGRAM(s) Oral every 12 hours  gabapentin 200 milliGRAM(s) Oral three times a day  influenza   Vaccine 0.5 milliLiter(s) IntraMuscular once  insulin lispro (HumaLOG) corrective regimen sliding scale   SubCutaneous three times a day before meals  insulin lispro (HumaLOG) corrective regimen sliding scale   SubCutaneous at bedtime  levothyroxine 175 MICROGram(s) Oral daily  milrinone Infusion 0.25 MICROgram(s)/kG/Min IV Continuous <Continuous>  potassium chloride    Tablet ER 40 milliEquivalent(s) Oral every 4 hours                            10.5   6.67  )-----------( 104      ( 13 Nov 2018 07:59 )             32.3       11-13    133<L>  |  85<L>  |  14  ----------------------------<  101<H>  3.2<L>   |  36<H>  |  0.73    Ca    8.3<L>      13 Nov 2018 06:24  Mg     1.4     11-12        T(C): 37.2 (11-13-18 @ 04:27), Max: 37.2 (11-13-18 @ 04:27)  HR: 105 (11-13-18 @ 04:27) (105 - 110)  BP: 102/67 (11-13-18 @ 04:27) (102/67 - 120/80)  RR: 18 (11-13-18 @ 04:27) (18 - 18)  SpO2: 95% (11-13-18 @ 04:27) (95% - 98%)  Wt(kg): --    JVP 12  tachy, no murmurs  lungs with rales  soft nt/nd  + 1 edema    device interrogation normal  EKG: NSR, Biv pacing    A/P) She is a pleasant 64 y/o female PMH severe NICM (LVEF 10-15%) who had a [employer] Biv-ICD implanted at [place]. A recent LHC at [place] was also unremarkable. She now returns for with a severe decompensation of her NICM. Her device interrogation demonstrates excellent RA, RV and LV lead function. She denies palpitations nor high voltage therapy.     -continue lasix, f/u cardiology  -f/u CHF consult for advanced therapies (VAD vs transplant)  -no need for AAD  -Her biv-icd was recently optimized by me. Future options including turning off BiV pacing to see if she responds better. This can be addressed when she's euvolemic  -will follow  -f/u with [facility] after discharge

## 2018-11-13 NOTE — DIETITIAN INITIAL EVALUATION ADULT. - ENERGY NEEDS
Ht: 67 inches, Wt: 201lbs, BMI: 30kg/m2, IBW: 135lbs +/- 10%, %IBW: 149%  Edema: +2 right left ankle  Skin: free of pressure injuries per nursing flow sheets   Pertinent Information: 64 y/o F with PMHx of NICM s/p AICD, HTN, moderate-severe MR, DM, medication non-compliance who was admitted with volume overload and acute on chronic decomp CHF and vaginal bleeding. S/p PICC line for Milrinone. Awaiting Endometrial BX.

## 2018-11-13 NOTE — PROGRESS NOTE ADULT - ATTENDING COMMENTS
Agree with above assessment and plan as outlined above.    - Bumex increased yesterday  - Keep negative  - Remains on milrinone   - No Urgency to start Beta Blockers    Augusto Grimes MD, FACC

## 2018-11-13 NOTE — PROGRESS NOTE ADULT - SUBJECTIVE AND OBJECTIVE BOX
pt seen and examined, no complaints, ROS - .    aspirin  chewable 81 milliGRAM(s) Oral daily  buMETAnide IVPB 3 milliGRAM(s) IV Intermittent every 12 hours  calcitriol   Capsule 0.5 MICROGram(s) Oral daily  calcium acetate 2001 milliGRAM(s) Oral three times a day with meals  calcium carbonate 1250 mG  + Vitamin D (OsCal 500 + D) 2 Tablet(s) Oral three times a day  cefpodoxime 200 milliGRAM(s) Oral every 12 hours  gabapentin 200 milliGRAM(s) Oral three times a day  influenza   Vaccine 0.5 milliLiter(s) IntraMuscular once  insulin lispro (HumaLOG) corrective regimen sliding scale   SubCutaneous three times a day before meals  insulin lispro (HumaLOG) corrective regimen sliding scale   SubCutaneous at bedtime  levothyroxine 175 MICROGram(s) Oral daily  milrinone Infusion 0.25 MICROgram(s)/kG/Min IV Continuous <Continuous>                            10.6   7.22  )-----------( 119      ( 12 Nov 2018 07:45 )             33.7       Hemoglobin: 10.6 g/dL (11-12 @ 07:45)  Hemoglobin: 10.3 g/dL (11-10 @ 08:06)  Hemoglobin: 10.6 g/dL (11-09 @ 06:50)      11-12    134<L>  |  88<L>  |  16  ----------------------------<  120<H>  4.0   |  38<H>  |  0.86    Ca    8.6      12 Nov 2018 05:36  Mg     1.4     11-12      Creatinine Trend: 0.86<--, 0.78<--, 0.96<--, 0.94<--, 1.07<--, 1.26<--    COAGS:           T(C): 37.2 (11-13-18 @ 04:27), Max: 37.2 (11-13-18 @ 04:27)  HR: 105 (11-13-18 @ 04:27) (80 - 110)  BP: 102/67 (11-13-18 @ 04:27) (102/67 - 120/80)  RR: 18 (11-13-18 @ 04:27) (17 - 18)  SpO2: 95% (11-13-18 @ 04:27) (95% - 98%)  Wt(kg): --    I&O's Summary    11 Nov 2018 07:01  -  12 Nov 2018 07:00  --------------------------------------------------------  IN: 1047.6 mL / OUT: 1950 mL / NET: -902.4 mL    12 Nov 2018 07:01  -  13 Nov 2018 05:35  --------------------------------------------------------  IN: 1335.2 mL / OUT: 2000 mL / NET: -664.8 mL        	  Lymphatic: No lymphadenopathy + pitting edema in LE bilaterally   Cardiovascular: Normal S1 S2,RRR, No murmurs , Peripheral pulses palpable 2+ bilaterally  Respiratory: Lungs clear to auscultation, normal effort 	  Gastrointestinal:  Soft, distended   Skin: No rashes, No ecchymoses, No cyanosis,       TELEMETRY:	    ECG: < from: 12 Lead ECG (11.06.18 @ 19:34) >  Atrial-sensed ventricular-paced rhythm  Biventricular pacemaker detected  ABNORMAL ECG    < end of copied text >    	  RADIOLOGY:   DIAGNOSTIC TESTING:  [ ] Echocardiogram: < from: Transthoracic Echocardiogram (08.23.18 @ 11:25) >  1. Tethered mitral valve leaflets with normal opening.  Moderate-severe mitral regurgitation.  2. Normal left ventricular internal dimensions and wall  thicknesses.  3. Severe global left ventricular systolic dysfunction.  4. Moderate right atrial enlargement.  A device wire is  noted in the right heart.  5. Right ventricular enlargement with decreased right  ventricular systolic function.  6. Normal tricuspid valve.  Severe tricuspid regurgitation.  7. Estimated pulmonary artery systolic pressure equals 24  mm Hg, assuming right atrial pressure equals 10  mm Hg,  consistent with normal pulmonary pressures.  *** Compared with echocardiogram of 6/29/2018, no  significant changes noted.    < end of copied text >    [ ]  Catheterization:  [ ] Stress Test:    OTHER: 	      ASSESSMENT/PLAN: 62 y/o F with PMHx of NICM s/p AICD, HTN, moderate-severe MR, DM, ? medication compliance who was admitted with volume overload and heart failure.    - cont inotropic assisted diuresis and bumex   -   GI / DVT prophylaxis,  keep K>4, mag >2.0   - cont synthroid   - Strict I+O's, keep net negative  - GYN follwo up   - D/w Dr. Grimes

## 2018-11-13 NOTE — PROGRESS NOTE ADULT - SUBJECTIVE AND OBJECTIVE BOX
S: no chest pain or sob     aspirin  chewable 81 milliGRAM(s) Oral daily  buMETAnide IVPB 3 milliGRAM(s) IV Intermittent every 12 hours  calcitriol   Capsule 0.5 MICROGram(s) Oral daily  calcium acetate 2001 milliGRAM(s) Oral three times a day with meals  calcium carbonate 1250 mG  + Vitamin D (OsCal 500 + D) 2 Tablet(s) Oral three times a day  cefpodoxime 200 milliGRAM(s) Oral every 12 hours  gabapentin 200 milliGRAM(s) Oral three times a day  influenza   Vaccine 0.5 milliLiter(s) IntraMuscular once  insulin lispro (HumaLOG) corrective regimen sliding scale   SubCutaneous three times a day before meals  insulin lispro (HumaLOG) corrective regimen sliding scale   SubCutaneous at bedtime  levothyroxine 175 MICROGram(s) Oral daily  milrinone Infusion 0.25 MICROgram(s)/kG/Min IV Continuous <Continuous>  potassium chloride    Tablet ER 40 milliEquivalent(s) Oral every 4 hours                            10.5   6.67  )-----------( 104      ( 13 Nov 2018 07:59 )             32.3       11-13    133<L>  |  85<L>  |  14  ----------------------------<  101<H>  3.2<L>   |  36<H>  |  0.73    Ca    8.3<L>      13 Nov 2018 06:24  Mg     1.4     11-12              T(C): 36.6 (11-13-18 @ 12:25), Max: 37.2 (11-13-18 @ 04:27)  HR: 95 (11-13-18 @ 12:25) (95 - 110)  BP: 101/68 (11-13-18 @ 12:25) (101/68 - 120/80)  RR: 18 (11-13-18 @ 12:25) (18 - 18)  SpO2: 99% (11-13-18 @ 12:25) (95% - 99%)  Wt(kg): --    I&O's Summary    12 Nov 2018 07:01  -  13 Nov 2018 07:00  --------------------------------------------------------  IN: 1399.8 mL / OUT: 2200 mL / NET: -800.2 mL    13 Nov 2018 07:01  -  13 Nov 2018 12:42  --------------------------------------------------------  IN: 300 mL / OUT: 0 mL / NET: 300 mL      	  Lymphatic: No lymphadenopathy + 1 pitting edema in LE bilaterally   Cardiovascular: Normal S1 S2,RRR, No murmurs ,  Respiratory: Lungs clear to auscultation, normal effort 	  Gastrointestinal:  Soft, distended   Skin: No rashes, No ecchymoses, No cyanosis,     TELEMETRY: SR, 	      ECG: < from: 12 Lead ECG (11.06.18 @ 19:34) >  Atrial-sensed ventricular-paced rhythm  Biventricular pacemaker detected  ABNORMAL ECG  < end of copied text >        < from: Xray Chest 1 View- PORTABLE-Routine (11.08.18 @ 08:51) >    Impression:    The heart is enlarged. Small left pleural effusion. The right lung is   clear. A pacer is in good position. A central line is seen on the right   and the tip is in superior vena cava. No pneumothorax.  < end of copied text >      < from: TTE with Doppler (w/Cont) (11.07.18 @ 05:53) >  ------------------------------------------------------------------------  Conclusions:  1. Tethered mitral valve leaflets with normal opening.  Mitral annular calcification and thickened  mitral leaflet  tips Moderate mitral regurgitation.  2. Calcified trileaflet aortic valve with normal opening.  3. Moderately dilated left atrium.  LA volume index = 43  cc/m2.  4. Eccentric left ventricular hypertrophy (dilated left  ventricle with normal relative wall thickness).  5. Severe global left ventricularsystolic dysfunction.  Endocardial visualization enhanced with intravenous  injection of Ultrasonic Enhancing Agent (Definity). No LV  thrombus.  Septal flattening consistent with right  ventricular overload.  6. Severe diastolic dysfunction with elevated filling  pressures  7. Severe right atrial enlargement.  8. Right ventricular enlargement with decreased right  ventricular systolic function.  A device wire is noted in  the right heart.  9. Dilated tricuspid annulus with malcoaptation of  tricuspid leaflets. Severe tricuspid regurgitation.    < end of copied text >  	  ICD check 11/7/18   · Additional Procedure Details	call to interrogate device to check arrhythmias normal sensing and pacing thresholds , stable lead impedances AT AF burden <0.1 % ,optivol fluid index below impedence line indicating no fluid accumulation No stored data for review	  · Comments	3.8 years battery life	  · Underlying Rhythm	SR 80's, not pacemaker dependent	  · % Bi-V Paced	100%	  · Battery	Good	      ASSESSMENT/PLAN: 62 y/o F with PMHx of NICM s/p AICD, HTN, moderate-severe MR, DM, medication non-compliance who was admitted with volume overload and acute on chronic decomp CHF       -continue with inotrope assisted diuresis  -s/p PICC line so patient can be on home milrinone  -continue to keep net negative  -diuretics per heart failure  -no further interventional workup needed at this time    Kunal Muller MD

## 2018-11-13 NOTE — PROGRESS NOTE ADULT - ASSESSMENT
62yo post-menopausal F w/ pmh of CHF, thyroid ca, HTN, DM, daily ASA user,  c/o vaginal bleeding that she noticed this morning. Pt states she woke up feeling fatigued with abdominal pressure and that when she sat down, she noticed that she had bled through her underwear. Pt has not experienced any bleeding episodes like this in the past. she admits to normal urinary and bowel functions and denies any HA, dizziness, SOB, CP, N/V/D, constipation. Pt has no known uterine pathologies and has not seen a GYN in 15-20 years. Currently Pt reports fatigue and daughter states she has been in this state for the past couple weeks since she was discharged from hospital for CHF exacerbation.      Problem/Plan - 1:  ·  Problem: Acute on chronic systolic congestive heart failure.  Plan: Improving. IV Bumex and Milrinone  . Cardiology and CHF team helping. S/P PICC Line.     Problem/Plan - 2:  ·  Problem: Acute renal failure.  Plan: Creatinine better.  Renal following .     Problem/Plan - 3:  ·  Problem: Hyperkalemia/ Hypokalemia/ hypomagnesemia  .  Plan: Correcting.      Problem/Plan - 4:  ·  Problem: Hyponatremia.  Plan: Correcting . Management per renal.      Problem/Plan - 5:  ·  Problem:  Hypocalcemia.  Plan: Corrected.  Replacing IV and PO ..      Problem/Plan - 6:  Problem:  Jaundice, hepatocellular. Plan: Likely from CHF . Monitoring LFT .GI following .     Problem/Plan - 7:  ·  Problem: DM (diabetes mellitus).  Plan: SSI and Lantus .Sugars in good control.      Problem/Plan - 8:  ·  Problem: UTI .  Plan: IV Abxs for 5 days .     Problem/Plan - 9:  ·  Problem: Hypothyroidism.  Plan: Synthroid home dose and will check TFT in am.      Problem/Plan - 10:  Problem: Vaginal bleeding. Plan; GYn consult consulted and awaiting Endometrial BX so will reconsult GYN.       Problem/Plan - 11:  ·  Problem: Coagulopathy .  Plan: Sec to Liver injury. INR better. Holding DVT prophylaxis as vaginal bleeding.       Problem/Plan - 12:  ·  Problem: Feet pain sec to Diabetic Neuropathy  .  Plan: Increased  Neurontin. Normal  B12 and Folate as well TFT.      Problem/Plan - 13:  ·  Problem: Mod to Severe MR and Severe TR .  Plan: Structural Heart team following.

## 2018-11-14 ENCOUNTER — TRANSCRIPTION ENCOUNTER (OUTPATIENT)
Age: 63
End: 2018-11-14

## 2018-11-14 DIAGNOSIS — R27.8 OTHER LACK OF COORDINATION: ICD-10-CM

## 2018-11-14 LAB
ALBUMIN SERPL ELPH-MCNC: 2.7 G/DL — LOW (ref 3.3–5)
ALP SERPL-CCNC: 282 U/L — HIGH (ref 40–120)
ALT FLD-CCNC: 27 U/L — SIGNIFICANT CHANGE UP (ref 10–45)
ANION GAP SERPL CALC-SCNC: 12 MMOL/L — SIGNIFICANT CHANGE UP (ref 5–17)
ANION GAP SERPL CALC-SCNC: 13 MMOL/L — SIGNIFICANT CHANGE UP (ref 5–17)
AST SERPL-CCNC: 61 U/L — HIGH (ref 10–40)
BILIRUB DIRECT SERPL-MCNC: 3.1 MG/DL — HIGH (ref 0–0.2)
BILIRUB INDIRECT FLD-MCNC: 1.7 MG/DL — HIGH (ref 0.2–1)
BILIRUB SERPL-MCNC: 4.8 MG/DL — HIGH (ref 0.2–1.2)
BUN SERPL-MCNC: 15 MG/DL — SIGNIFICANT CHANGE UP (ref 7–23)
BUN SERPL-MCNC: 18 MG/DL — SIGNIFICANT CHANGE UP (ref 7–23)
CALCIUM SERPL-MCNC: 7.3 MG/DL — LOW (ref 8.4–10.5)
CALCIUM SERPL-MCNC: 7.7 MG/DL — LOW (ref 8.4–10.5)
CHLORIDE SERPL-SCNC: 84 MMOL/L — LOW (ref 96–108)
CHLORIDE SERPL-SCNC: 85 MMOL/L — LOW (ref 96–108)
CO2 SERPL-SCNC: 34 MMOL/L — HIGH (ref 22–31)
CO2 SERPL-SCNC: 35 MMOL/L — HIGH (ref 22–31)
CREAT SERPL-MCNC: 0.74 MG/DL — SIGNIFICANT CHANGE UP (ref 0.5–1.3)
CREAT SERPL-MCNC: 0.74 MG/DL — SIGNIFICANT CHANGE UP (ref 0.5–1.3)
GLUCOSE BLDC GLUCOMTR-MCNC: 122 MG/DL — HIGH (ref 70–99)
GLUCOSE BLDC GLUCOMTR-MCNC: 148 MG/DL — HIGH (ref 70–99)
GLUCOSE BLDC GLUCOMTR-MCNC: 200 MG/DL — HIGH (ref 70–99)
GLUCOSE BLDC GLUCOMTR-MCNC: 248 MG/DL — HIGH (ref 70–99)
GLUCOSE SERPL-MCNC: 161 MG/DL — HIGH (ref 70–99)
GLUCOSE SERPL-MCNC: 230 MG/DL — HIGH (ref 70–99)
HCT VFR BLD CALC: 33.2 % — LOW (ref 34.5–45)
HGB BLD-MCNC: 10.4 G/DL — LOW (ref 11.5–15.5)
INR BLD: 1.6 RATIO — HIGH (ref 0.88–1.16)
MAGNESIUM SERPL-MCNC: 1.2 MG/DL — LOW (ref 1.6–2.6)
MCHC RBC-ENTMCNC: 22.7 PG — LOW (ref 27–34)
MCHC RBC-ENTMCNC: 31.3 GM/DL — LOW (ref 32–36)
MCV RBC AUTO: 72.3 FL — LOW (ref 80–100)
PHOSPHATE SERPL-MCNC: 4 MG/DL — SIGNIFICANT CHANGE UP (ref 2.5–4.5)
PLATELET # BLD AUTO: 111 K/UL — LOW (ref 150–400)
POTASSIUM SERPL-MCNC: 3.2 MMOL/L — LOW (ref 3.5–5.3)
POTASSIUM SERPL-MCNC: 3.4 MMOL/L — LOW (ref 3.5–5.3)
POTASSIUM SERPL-SCNC: 3.2 MMOL/L — LOW (ref 3.5–5.3)
POTASSIUM SERPL-SCNC: 3.4 MMOL/L — LOW (ref 3.5–5.3)
PROT SERPL-MCNC: 6.4 G/DL — SIGNIFICANT CHANGE UP (ref 6–8.3)
PROTHROM AB SERPL-ACNC: 18.5 SEC — HIGH (ref 10–13.1)
RBC # BLD: 4.59 M/UL — SIGNIFICANT CHANGE UP (ref 3.8–5.2)
RBC # FLD: 25.6 % — HIGH (ref 10.3–14.5)
SODIUM SERPL-SCNC: 131 MMOL/L — LOW (ref 135–145)
SODIUM SERPL-SCNC: 132 MMOL/L — LOW (ref 135–145)
WBC # BLD: 7.31 K/UL — SIGNIFICANT CHANGE UP (ref 3.8–10.5)
WBC # FLD AUTO: 7.31 K/UL — SIGNIFICANT CHANGE UP (ref 3.8–10.5)

## 2018-11-14 PROCEDURE — 99233 SBSQ HOSP IP/OBS HIGH 50: CPT | Mod: GC

## 2018-11-14 RX ORDER — POTASSIUM CHLORIDE 20 MEQ
40 PACKET (EA) ORAL EVERY 4 HOURS
Qty: 0 | Refills: 0 | Status: COMPLETED | OUTPATIENT
Start: 2018-11-14 | End: 2018-11-14

## 2018-11-14 RX ORDER — LACTULOSE 10 G/15ML
15 SOLUTION ORAL ONCE
Qty: 0 | Refills: 0 | Status: COMPLETED | OUTPATIENT
Start: 2018-11-14 | End: 2018-11-14

## 2018-11-14 RX ORDER — SPIRONOLACTONE 25 MG/1
25 TABLET, FILM COATED ORAL DAILY
Qty: 0 | Refills: 0 | Status: DISCONTINUED | OUTPATIENT
Start: 2018-11-14 | End: 2018-11-16

## 2018-11-14 RX ORDER — BUMETANIDE 0.25 MG/ML
3 INJECTION INTRAMUSCULAR; INTRAVENOUS EVERY 12 HOURS
Qty: 0 | Refills: 0 | Status: DISCONTINUED | OUTPATIENT
Start: 2018-11-14 | End: 2018-11-15

## 2018-11-14 RX ORDER — HYDRALAZINE HCL 50 MG
10 TABLET ORAL EVERY 8 HOURS
Qty: 0 | Refills: 0 | Status: DISCONTINUED | OUTPATIENT
Start: 2018-11-14 | End: 2018-11-15

## 2018-11-14 RX ADMIN — CALCITRIOL 0.5 MICROGRAM(S): 0.5 CAPSULE ORAL at 11:31

## 2018-11-14 RX ADMIN — Medication 40 MILLIEQUIVALENT(S): at 13:21

## 2018-11-14 RX ADMIN — SPIRONOLACTONE 25 MILLIGRAM(S): 25 TABLET, FILM COATED ORAL at 21:28

## 2018-11-14 RX ADMIN — Medication 2001 MILLIGRAM(S): at 17:24

## 2018-11-14 RX ADMIN — BUMETANIDE 3 MILLIGRAM(S): 0.25 INJECTION INTRAMUSCULAR; INTRAVENOUS at 17:23

## 2018-11-14 RX ADMIN — GABAPENTIN 200 MILLIGRAM(S): 400 CAPSULE ORAL at 13:21

## 2018-11-14 RX ADMIN — Medication 2 TABLET(S): at 09:00

## 2018-11-14 RX ADMIN — Medication 175 MICROGRAM(S): at 05:20

## 2018-11-14 RX ADMIN — Medication 81 MILLIGRAM(S): at 11:31

## 2018-11-14 RX ADMIN — Medication 2001 MILLIGRAM(S): at 11:31

## 2018-11-14 RX ADMIN — Medication 2: at 17:24

## 2018-11-14 RX ADMIN — Medication 2001 MILLIGRAM(S): at 09:00

## 2018-11-14 RX ADMIN — Medication 10 MILLIGRAM(S): at 13:22

## 2018-11-14 RX ADMIN — Medication 2 TABLET(S): at 21:27

## 2018-11-14 RX ADMIN — Medication 200 MILLIGRAM(S): at 17:24

## 2018-11-14 RX ADMIN — GABAPENTIN 200 MILLIGRAM(S): 400 CAPSULE ORAL at 21:27

## 2018-11-14 RX ADMIN — Medication 10 MILLIGRAM(S): at 21:27

## 2018-11-14 RX ADMIN — Medication 40 MILLIEQUIVALENT(S): at 11:31

## 2018-11-14 RX ADMIN — GABAPENTIN 200 MILLIGRAM(S): 400 CAPSULE ORAL at 05:20

## 2018-11-14 RX ADMIN — BUMETANIDE 124 MILLIGRAM(S): 0.25 INJECTION INTRAMUSCULAR; INTRAVENOUS at 05:19

## 2018-11-14 RX ADMIN — Medication 200 MILLIGRAM(S): at 05:20

## 2018-11-14 RX ADMIN — Medication 2 TABLET(S): at 13:21

## 2018-11-14 NOTE — DISCHARGE NOTE ADULT - CARE PROVIDER_API CALL
Javier Gallego (MD; PhD), Adv Heart Fail Trnsplnt Cardio; Cardiovascular Disease; Internal Medicine  [address] [place] [address]  Phone: [phone]  Fax: [phone] Javier Gallego (MD; PhD), Adv Heart Fail Trnsplnt Cardio; Cardiovascular Disease; Internal Medicine  [address] [place] [address]  Phone: [phone]  Fax: [phone]    Heart Failure Team,   [address] [address]  Phone: (   )    -  Fax: (   )    -

## 2018-11-14 NOTE — DISCHARGE NOTE ADULT - [id number]_GEN_ALL_CORE
rehabilitation services/observation and assessment/teaching and training/central venous access care/medication teaching and assessment

## 2018-11-14 NOTE — PROGRESS NOTE ADULT - SUBJECTIVE AND OBJECTIVE BOX
S: no chest pain or sob; complains of bilateral leg pain         aspirin  chewable 81 milliGRAM(s) Oral daily  buMETAnide 3 milliGRAM(s) Oral every 12 hours  calcitriol   Capsule 0.5 MICROGram(s) Oral daily  calcium acetate 2001 milliGRAM(s) Oral three times a day with meals  calcium carbonate 1250 mG  + Vitamin D (OsCal 500 + D) 2 Tablet(s) Oral three times a day  cefpodoxime 200 milliGRAM(s) Oral every 12 hours  gabapentin 200 milliGRAM(s) Oral three times a day  hydrALAZINE 10 milliGRAM(s) Oral every 8 hours  influenza   Vaccine 0.5 milliLiter(s) IntraMuscular once  insulin lispro (HumaLOG) corrective regimen sliding scale   SubCutaneous three times a day before meals  insulin lispro (HumaLOG) corrective regimen sliding scale   SubCutaneous at bedtime  levothyroxine 175 MICROGram(s) Oral daily  milrinone Infusion 0.25 MICROgram(s)/kG/Min IV Continuous <Continuous>  rifaximin 550 milliGRAM(s) Oral two times a day  spironolactone 25 milliGRAM(s) Oral daily                            10.4   7.31  )-----------( 111      ( 14 Nov 2018 07:43 )             33.2       11-14    132<L>  |  85<L>  |  18  ----------------------------<  161<H>  3.4<L>   |  34<H>  |  0.74    Ca    7.7<L>      14 Nov 2018 06:00    TPro  6.4  /  Alb  2.7<L>  /  TBili  4.8<H>  /  DBili  3.1<H>  /  AST  61<H>  /  ALT  27  /  AlkPhos  282<H>  11-14            T(C): 37.3 (11-14-18 @ 11:28), Max: 37.3 (11-14-18 @ 11:28)  HR: 104 (11-14-18 @ 13:23) (102 - 108)  BP: 94/61 (11-14-18 @ 13:23) (93/59 - 106/71)  RR: 17 (11-14-18 @ 11:28) (17 - 18)  SpO2: 96% (11-14-18 @ 11:28) (96% - 97%)  Wt(kg): --    I&O's Summary    13 Nov 2018 07:01  -  14 Nov 2018 07:00  --------------------------------------------------------  IN: 1185.2 mL / OUT: 2175 mL / NET: -989.8 mL    14 Nov 2018 07:01  -  14 Nov 2018 16:24  --------------------------------------------------------  IN: 300 mL / OUT: 800 mL / NET: -500 mL        	  Lymphatic: No lymphadenopathy + 1 pitting edema in LE bilaterally   Cardiovascular: Normal S1 S2,RRR, No murmurs ,  Respiratory: Lungs clear to auscultation  Gastrointestinal:  Soft, distended   Skin: No rashes, No ecchymoses, No cyanosis,     TELEMETRY: SR, 	      ECG: < from: 12 Lead ECG (11.06.18 @ 19:34) >  Atrial-sensed ventricular-paced rhythm  Biventricular pacemaker detected  ABNORMAL ECG  < end of copied text >        < from: Xray Chest 1 View- PORTABLE-Routine (11.08.18 @ 08:51) >    Impression:    The heart is enlarged. Small left pleural effusion. The right lung is   clear. A pacer is in good position. A central line is seen on the right   and the tip is in superior vena cava. No pneumothorax.  < end of copied text >      < from: TTE with Doppler (w/Cont) (11.07.18 @ 05:53) >  ------------------------------------------------------------------------  Conclusions:  1. Tethered mitral valve leaflets with normal opening.  Mitral annular calcification and thickened  mitral leaflet  tips Moderate mitral regurgitation.  2. Calcified trileaflet aortic valve with normal opening.  3. Moderately dilated left atrium.  LA volume index = 43  cc/m2.  4. Eccentric left ventricular hypertrophy (dilated left  ventricle with normal relative wall thickness).  5. Severe global left ventricularsystolic dysfunction.  Endocardial visualization enhanced with intravenous  injection of Ultrasonic Enhancing Agent (Definity). No LV  thrombus.  Septal flattening consistent with right  ventricular overload.  6. Severe diastolic dysfunction with elevated filling  pressures  7. Severe right atrial enlargement.  8. Right ventricular enlargement with decreased right  ventricular systolic function.  A device wire is noted in  the right heart.  9. Dilated tricuspid annulus with malcoaptation of  tricuspid leaflets. Severe tricuspid regurgitation.    < end of copied text >  	  ICD check 11/7/18   · Additional Procedure Details	call to interrogate device to check arrhythmias normal sensing and pacing thresholds , stable lead impedances AT AF burden <0.1 % ,optivol fluid index below impedence line indicating no fluid accumulation No stored data for review	  · Comments	3.8 years battery life	  · Underlying Rhythm	SR 80's, not pacemaker dependent	  · % Bi-V Paced	100%	  · Battery	Good	      ASSESSMENT/PLAN: 62 y/o F with PMHx of NICM s/p AICD, HTN, moderate-severe MR, DM, medication non-compliance who was admitted with volume overload and acute on chronic decomp CHF       -continue with inotrope assisted diuresis  -keep net negative  -diuretics per heart failure and cardiology  -no further interventional workup needed at this time  -follow up with Dr. Robbins post discharge     Kunal Muller MD

## 2018-11-14 NOTE — PROGRESS NOTE ADULT - PROBLEM SELECTOR PLAN 1
continue Milrinone 0.25   - change diuretic to Bumetanide 3 mg PO BID   - start Spironolactone 25 mg PO Daily   discussed the possible need for advanced therapies and encouraged outpatient follow up in HF Clinic

## 2018-11-14 NOTE — PROGRESS NOTE ADULT - ATTENDING COMMENTS
Patient seen and examined, agree with above assessment and plan as transcribed above.    - Cont to keep net negative    Augusto Grimes MD, FACC

## 2018-11-14 NOTE — DISCHARGE NOTE ADULT - INSTRUCTIONS
No fluid restrictions. Resume diabetic diet with consistent amount of carbohydrates and sugar. Avoid sweetened and carbonated beverages, pasta, white bread, white rice, cake and other sweet desserts.

## 2018-11-14 NOTE — PROGRESS NOTE ADULT - SUBJECTIVE AND OBJECTIVE BOX
INTERVAL HPI/OVERNIGHT EVENTS:  Vital Signs Last 24 Hrs  T(C): 37.3 (14 Nov 2018 11:28), Max: 37.3 (14 Nov 2018 11:28)  T(F): 99.1 (14 Nov 2018 11:28), Max: 99.1 (14 Nov 2018 11:28)  HR: 103 (14 Nov 2018 11:28) (95 - 108)  BP: 93/59 (14 Nov 2018 11:28) (93/59 - 112/77)  BP(mean): --  RR: 17 (14 Nov 2018 11:28) (17 - 18)  SpO2: 96% (14 Nov 2018 11:28) (96% - 100%)  I&O's Summary    13 Nov 2018 07:01  -  14 Nov 2018 07:00  --------------------------------------------------------  IN: 1185.2 mL / OUT: 2175 mL / NET: -989.8 mL      MEDICATIONS  (STANDING):  aspirin  chewable 81 milliGRAM(s) Oral daily  buMETAnide 3 milliGRAM(s) Oral every 12 hours  calcitriol   Capsule 0.5 MICROGram(s) Oral daily  calcium acetate 2001 milliGRAM(s) Oral three times a day with meals  calcium carbonate 1250 mG  + Vitamin D (OsCal 500 + D) 2 Tablet(s) Oral three times a day  cefpodoxime 200 milliGRAM(s) Oral every 12 hours  gabapentin 200 milliGRAM(s) Oral three times a day  influenza   Vaccine 0.5 milliLiter(s) IntraMuscular once  insulin lispro (HumaLOG) corrective regimen sliding scale   SubCutaneous three times a day before meals  insulin lispro (HumaLOG) corrective regimen sliding scale   SubCutaneous at bedtime  levothyroxine 175 MICROGram(s) Oral daily  milrinone Infusion 0.25 MICROgram(s)/kG/Min (7.26 mL/Hr) IV Continuous <Continuous>  potassium chloride    Tablet ER 40 milliEquivalent(s) Oral every 4 hours    MEDICATIONS  (PRN):    LABS:                        10.4   7.31  )-----------( x        ( 14 Nov 2018 07:43 )             33.2     11-14    132<L>  |  85<L>  |  18  ----------------------------<  161<H>  3.4<L>   |  34<H>  |  0.74    Ca    7.7<L>      14 Nov 2018 06:00    TPro  6.4  /  Alb  2.7<L>  /  TBili  4.8<H>  /  DBili  3.1<H>  /  AST  61<H>  /  ALT  27  /  AlkPhos  282<H>  11-14    PT/INR - ( 14 Nov 2018 08:58 )   PT: 18.5 sec;   INR: 1.60 ratio             CAPILLARY BLOOD GLUCOSE      POCT Blood Glucose.: 148 mg/dL (14 Nov 2018 07:41)  POCT Blood Glucose.: 197 mg/dL (13 Nov 2018 21:28)  POCT Blood Glucose.: 188 mg/dL (13 Nov 2018 16:35)  POCT Blood Glucose.: 196 mg/dL (13 Nov 2018 12:05)          REVIEW OF SYSTEMS:  CONSTITUTIONAL: No fever, weight loss, or fatigue  EYES: No eye pain, visual disturbances, or discharge  ENMT:  No difficulty hearing, tinnitus, vertigo; No sinus or throat pain  NECK: No pain or stiffness  BREASTS: No pain, masses, or nipple discharge  RESPIRATORY: No cough, wheezing, chills or hemoptysis; No shortness of breath  CARDIOVASCULAR: No chest pain, palpitations, dizziness, or leg swelling  GASTROINTESTINAL: No abdominal or epigastric pain. No nausea, vomiting, or hematemesis; No diarrhea or constipation. No melena or hematochezia.  GENITOURINARY: No dysuria, frequency, hematuria, or incontinence  NEUROLOGICAL: No headaches, memory loss, loss of strength, numbness, or tremors  SKIN: No itching, burning, rashes, or lesions   LYMPH NODES: No enlarged glands  ENDOCRINE: No heat or cold intolerance; No hair loss  MUSCULOSKELETAL: No joint pain or swelling; No muscle, back, or extremity pain  PSYCHIATRIC: No depression, anxiety, mood swings, or difficulty sleeping  HEME/LYMPH: No easy bruising, or bleeding gums  ALLERY AND IMMUNOLOGIC: No hives or eczema    RADIOLOGY & ADDITIONAL TESTS:    Consultant(s) Notes Reviewed:  [x ] YES  [ ] NO    PHYSICAL EXAM:  GENERAL: NAD, well-groomed, well-developed,not in any distress ,  HEAD:  Atraumatic, Normocephalic  EYES: EOMI, PERRLA, conjunctiva and sclera clear  ENMT: No tonsillar erythema, exudates, or enlargement; Moist mucous membranes, Good dentition, No lesions  NECK: Supple, No JVD, Normal thyroid  NERVOUS SYSTEM:  Alert & Oriented X3, No focal deficit   CHEST/LUNG: Good air entry bilateral with no  rales, rhonchi, wheezing, or rubs  HEART: Regular rate and rhythm; No murmurs, rubs, or gallops  ABDOMEN: Soft, Nontender, Nondistended; Bowel sounds present  EXTREMITIES:  2+ Peripheral Pulses, No clubbing, cyanosis, or edema  SKIN: No rashes or lesions    Care Discussed with Consultants/Other Providers [ x] YES  [ ] NO INTERVAL HPI/OVERNIGHT EVENTS: I feel better and no pain in feet.   Vital Signs Last 24 Hrs  T(C): 37.3 (14 Nov 2018 11:28), Max: 37.3 (14 Nov 2018 11:28)  T(F): 99.1 (14 Nov 2018 11:28), Max: 99.1 (14 Nov 2018 11:28)  HR: 103 (14 Nov 2018 11:28) (95 - 108)  BP: 93/59 (14 Nov 2018 11:28) (93/59 - 112/77)  BP(mean): --  RR: 17 (14 Nov 2018 11:28) (17 - 18)  SpO2: 96% (14 Nov 2018 11:28) (96% - 100%)  I&O's Summary    13 Nov 2018 07:01  -  14 Nov 2018 07:00  --------------------------------------------------------  IN: 1185.2 mL / OUT: 2175 mL / NET: -989.8 mL      MEDICATIONS  (STANDING):  aspirin  chewable 81 milliGRAM(s) Oral daily  buMETAnide 3 milliGRAM(s) Oral every 12 hours  calcitriol   Capsule 0.5 MICROGram(s) Oral daily  calcium acetate 2001 milliGRAM(s) Oral three times a day with meals  calcium carbonate 1250 mG  + Vitamin D (OsCal 500 + D) 2 Tablet(s) Oral three times a day  cefpodoxime 200 milliGRAM(s) Oral every 12 hours  gabapentin 200 milliGRAM(s) Oral three times a day  influenza   Vaccine 0.5 milliLiter(s) IntraMuscular once  insulin lispro (HumaLOG) corrective regimen sliding scale   SubCutaneous three times a day before meals  insulin lispro (HumaLOG) corrective regimen sliding scale   SubCutaneous at bedtime  levothyroxine 175 MICROGram(s) Oral daily  milrinone Infusion 0.25 MICROgram(s)/kG/Min (7.26 mL/Hr) IV Continuous <Continuous>  potassium chloride    Tablet ER 40 milliEquivalent(s) Oral every 4 hours    MEDICATIONS  (PRN):    LABS:                        10.4   7.31  )-----------( x        ( 14 Nov 2018 07:43 )             33.2     11-14    132<L>  |  85<L>  |  18  ----------------------------<  161<H>  3.4<L>   |  34<H>  |  0.74    Ca    7.7<L>      14 Nov 2018 06:00    TPro  6.4  /  Alb  2.7<L>  /  TBili  4.8<H>  /  DBili  3.1<H>  /  AST  61<H>  /  ALT  27  /  AlkPhos  282<H>  11-14    PT/INR - ( 14 Nov 2018 08:58 )   PT: 18.5 sec;   INR: 1.60 ratio             CAPILLARY BLOOD GLUCOSE      POCT Blood Glucose.: 148 mg/dL (14 Nov 2018 07:41)  POCT Blood Glucose.: 197 mg/dL (13 Nov 2018 21:28)  POCT Blood Glucose.: 188 mg/dL (13 Nov 2018 16:35)  POCT Blood Glucose.: 196 mg/dL (13 Nov 2018 12:05)          REVIEW OF SYSTEMS:  CONSTITUTIONAL: No fever, weight loss, or fatigue  EYES: No eye pain, visual disturbances, or discharge  ENMT:  No difficulty hearing, tinnitus, vertigo; No sinus or throat pain  NECK: No pain or stiffness  RESPIRATORY: No cough, wheezing, chills or hemoptysis; No shortness of breath  CARDIOVASCULAR: No chest pain, palpitations, dizziness, or leg swelling  GASTROINTESTINAL: No abdominal or epigastric pain. No nausea, vomiting, or hematemesis; No diarrhea or constipation. No melena or hematochezia.  GENITOURINARY: No dysuria, frequency, hematuria, or incontinence  NEUROLOGICAL: No headaches, memory loss, loss of strength, numbness, or tremors      Consultant(s) Notes Reviewed:  [x ] YES  [ ] NO    PHYSICAL EXAM:  GENERAL: NAD, well-groomed, well-developed,not in any distress ,  HEAD:  Atraumatic, Normocephalic  NECK: Supple,+JVD, Normal thyroid  NERVOUS SYSTEM:  Alert & Oriented X3, No focal deficit   CHEST/LUNG: Good air entry bilateral with no  rales, rhonchi, wheezing, or rubs  HEART: Regular rate and rhythm; No murmurs, rubs, or gallops  ABDOMEN: Soft, Nontender, Nondistended; Bowel sounds present  EXTREMITIES:  2+ Peripheral Pulses, No clubbing, cyanosis, but less edema    Care Discussed with Consultants/Other Providers [ x] YES  [ ] NO

## 2018-11-14 NOTE — DISCHARGE NOTE ADULT - MEDICATION SUMMARY - MEDICATIONS TO STOP TAKING
I will STOP taking the medications listed below when I get home from the hospital:    Pepcid  -- As Needed    potassium  -- 1 tab(s) by mouth once a day    ***see internal [facility]***    torsemide 20 mg oral tablet  -- 5 tab(s) by mouth 2 times a day    ***see internal [facility]***  -- Avoid prolonged or excessive exposure to direct and/or artificial sunlight while taking this medication.  It is very important that you take or use this exactly as directed.  Do not skip doses or discontinue unless directed by your doctor.  It may be advisable to drink a full glass orange juice or eat a banana daily while taking this medication.    calcitriol 0.25 mcg oral capsule  -- 1 cap(s) by mouth once a day    Entresto 24 mg-26 mg oral tablet  -- 1 tab(s) by mouth 2 times a day

## 2018-11-14 NOTE — DISCHARGE NOTE ADULT - CARE PROVIDERS DIRECT ADDRESSES
,[email or web address]_Unknown

## 2018-11-14 NOTE — DISCHARGE NOTE ADULT - SECONDARY DIAGNOSIS.
Acute renal failure Electrolyte imbalance Hyponatremia Jaundice, hepatocellular Hypocalcemia Type 2 diabetes mellitus without complication, without long-term current use of insulin

## 2018-11-14 NOTE — PROGRESS NOTE ADULT - SUBJECTIVE AND OBJECTIVE BOX
Patient with no anginal chest pain or shortness of breath      MEDICATIONS  (STANDING):  aspirin  chewable 81 milliGRAM(s) Oral daily  buMETAnide 3 milliGRAM(s) Oral every 12 hours  calcitriol   Capsule 0.5 MICROGram(s) Oral daily  calcium acetate 2001 milliGRAM(s) Oral three times a day with meals  calcium carbonate 1250 mG  + Vitamin D (OsCal 500 + D) 2 Tablet(s) Oral three times a day  cefpodoxime 200 milliGRAM(s) Oral every 12 hours  gabapentin 200 milliGRAM(s) Oral three times a day  hydrALAZINE 10 milliGRAM(s) Oral every 8 hours  influenza   Vaccine 0.5 milliLiter(s) IntraMuscular once  insulin lispro (HumaLOG) corrective regimen sliding scale   SubCutaneous three times a day before meals  insulin lispro (HumaLOG) corrective regimen sliding scale   SubCutaneous at bedtime  levothyroxine 175 MICROGram(s) Oral daily  milrinone Infusion 0.25 MICROgram(s)/kG/Min (7.26 mL/Hr) IV Continuous <Continuous>  rifaximin 550 milliGRAM(s) Oral two times a day  spironolactone 25 milliGRAM(s) Oral daily    MEDICATIONS  (PRN):      LABS:                        10.4   7.31  )-----------( 111      ( 14 Nov 2018 07:43 )             33.2     Hemoglobin: 10.4 g/dL (11-14 @ 07:43)  Hemoglobin: 10.5 g/dL (11-13 @ 07:59)  Hemoglobin: 10.6 g/dL (11-12 @ 07:45)  Hemoglobin: 10.3 g/dL (11-10 @ 08:06)    11-14    132<L>  |  85<L>  |  18  ----------------------------<  161<H>  3.4<L>   |  34<H>  |  0.74    Ca    7.7<L>      14 Nov 2018 06:00    TPro  6.4  /  Alb  2.7<L>  /  TBili  4.8<H>  /  DBili  3.1<H>  /  AST  61<H>  /  ALT  27  /  AlkPhos  282<H>  11-14    Creatinine Trend: 0.74<--, 0.73<--, 0.86<--, 0.78<--, 0.96<--, 0.94<--   PT/INR - ( 14 Nov 2018 08:58 )   PT: 18.5 sec;   INR: 1.60 ratio                 PHYSICAL EXAM  Vital Signs Last 24 Hrs  T(C): 37.3 (14 Nov 2018 11:28), Max: 37.3 (14 Nov 2018 11:28)  T(F): 99.1 (14 Nov 2018 11:28), Max: 99.1 (14 Nov 2018 11:28)  HR: 104 (14 Nov 2018 13:23) (102 - 108)  BP: 94/61 (14 Nov 2018 13:23) (93/59 - 108/70)  BP(mean): --  RR: 17 (14 Nov 2018 11:28) (17 - 18)  SpO2: 96% (14 Nov 2018 11:28) (96% - 97%)  	  Lymphatic: No lymphadenopathy + pitting edema in LE bilaterally   Cardiovascular: Normal S1 S2,RRR, No murmurs , Peripheral pulses palpable 2+ bilaterally  Respiratory: Lungs clear to auscultation, normal effort 	  Gastrointestinal:  Soft, distended   Skin: No rashes, No ecchymoses, No cyanosis,       TELEMETRY:	    ECG: < from: 12 Lead ECG (11.06.18 @ 19:34) >  Atrial-sensed ventricular-paced rhythm  Biventricular pacemaker detected  ABNORMAL ECG    < end of copied text >    	  RADIOLOGY:   DIAGNOSTIC TESTING:  [ ] Echocardiogram: < from: Transthoracic Echocardiogram (08.23.18 @ 11:25) >  1. Tethered mitral valve leaflets with normal opening.  Moderate-severe mitral regurgitation.  2. Normal left ventricular internal dimensions and wall  thicknesses.  3. Severe global left ventricular systolic dysfunction.  4. Moderate right atrial enlargement.  A device wire is  noted in the right heart.  5. Right ventricular enlargement with decreased right  ventricular systolic function.  6. Normal tricuspid valve.  Severe tricuspid regurgitation.  7. Estimated pulmonary artery systolic pressure equals 24  mm Hg, assuming right atrial pressure equals 10  mm Hg,  consistent with normal pulmonary pressures.  *** Compared with echocardiogram of 6/29/2018, no  significant changes noted.    < end of copied text >    [ ]  Catheterization:  [ ] Stress Test:    OTHER: 	      ASSESSMENT/PLAN: 64 y/o F with PMHx of NICM s/p AICD, HTN, moderate-severe MR, DM, ? medication compliance who was admitted with volume overload and heart failure.    - CHF team recs noted - cont inotropic assisted diuresis and bumex PO   - GI / DVT prophylaxis,  keep K>4, mag >2.0   - cont synthroid   - Strict I+O's, keep net negative  - GYN follow up   - HD stable   - follow up Dr Robbins for cardiology upon DC

## 2018-11-14 NOTE — PROGRESS NOTE ADULT - SUBJECTIVE AND OBJECTIVE BOX
EP ATTENDING    tele: NSR, biv pacing    no palpitations, no syncope, no angina    aspirin  chewable 81 milliGRAM(s) Oral daily  buMETAnide 3 milliGRAM(s) Oral every 12 hours  calcitriol   Capsule 0.5 MICROGram(s) Oral daily  calcium acetate 2001 milliGRAM(s) Oral three times a day with meals  calcium carbonate 1250 mG  + Vitamin D (OsCal 500 + D) 2 Tablet(s) Oral three times a day  cefpodoxime 200 milliGRAM(s) Oral every 12 hours  gabapentin 200 milliGRAM(s) Oral three times a day  influenza   Vaccine 0.5 milliLiter(s) IntraMuscular once  insulin lispro (HumaLOG) corrective regimen sliding scale   SubCutaneous three times a day before meals  insulin lispro (HumaLOG) corrective regimen sliding scale   SubCutaneous at bedtime  levothyroxine 175 MICROGram(s) Oral daily  milrinone Infusion 0.25 MICROgram(s)/kG/Min IV Continuous <Continuous>                            10.4   7.31  )-----------( x        ( 14 Nov 2018 07:43 )             33.2       11-14    132<L>  |  85<L>  |  18  ----------------------------<  161<H>  3.4<L>   |  34<H>  |  0.74    Ca    7.7<L>      14 Nov 2018 06:00    TPro  6.4  /  Alb  2.7<L>  /  TBili  4.8<H>  /  DBili  3.1<H>  /  AST  61<H>  /  ALT  27  /  AlkPhos  282<H>  11-14      T(C): 36.7 (11-14-18 @ 04:32), Max: 36.7 (11-13-18 @ 20:43)  HR: 108 (11-14-18 @ 04:32) (95 - 108)  BP: 106/71 (11-14-18 @ 04:32) (101/68 - 112/77)  RR: 18 (11-14-18 @ 04:32) (18 - 18)  SpO2: 96% (11-14-18 @ 04:32) (96% - 100%)  Wt(kg): --    JVP 12  tachy, no murmurs  lungs with rales  soft nt/nd  + 2 edema    device interrogation normal  EKG: NSR, Biv pacing    A/P) She is a pleasant 62 y/o female PMH severe NICM (LVEF 10-15%) who had a [employer] Biv-ICD implanted at [place]. A recent LHC at [place] was also unremarkable. She now returns for with a severe decompensation of her NICM. Her device interrogation demonstrates excellent RA, RV and LV lead function. She denies palpitations nor high voltage therapy.     -continue lasix and milrinone as patient is still grossly volume overloaded, f/u cardiology  -f/u CHF consult for advanced therapies (VAD vs transplant)  -no need for AAD  -Her biv-icd was recently optimized by me. Future options including turning off BiV pacing to see if she responds better. This can be addressed when she's euvolemic  -will follow  -f/u with [facility] after discharge

## 2018-11-14 NOTE — PROGRESS NOTE ADULT - SUBJECTIVE AND OBJECTIVE BOX
NEPHROLOGY - NSN    Patient seen and examined. C/O leg pain x weeks due to swelling.     MEDICATIONS  (STANDING):  aspirin  chewable 81 milliGRAM(s) Oral daily  buMETAnide 3 milliGRAM(s) Oral every 12 hours  calcitriol   Capsule 0.5 MICROGram(s) Oral daily  calcium acetate 2001 milliGRAM(s) Oral three times a day with meals  calcium carbonate 1250 mG  + Vitamin D (OsCal 500 + D) 2 Tablet(s) Oral three times a day  cefpodoxime 200 milliGRAM(s) Oral every 12 hours  gabapentin 200 milliGRAM(s) Oral three times a day  hydrALAZINE 10 milliGRAM(s) Oral every 8 hours  influenza   Vaccine 0.5 milliLiter(s) IntraMuscular once  insulin lispro (HumaLOG) corrective regimen sliding scale   SubCutaneous three times a day before meals  insulin lispro (HumaLOG) corrective regimen sliding scale   SubCutaneous at bedtime  levothyroxine 175 MICROGram(s) Oral daily  milrinone Infusion 0.25 MICROgram(s)/kG/Min (7.26 mL/Hr) IV Continuous <Continuous>  rifaximin 550 milliGRAM(s) Oral two times a day  spironolactone 25 milliGRAM(s) Oral daily    VITALS:  T(C): , Max: 37.3 (11-14-18 @ 11:28)  T(F): , Max: 99.1 (11-14-18 @ 11:28)  HR: 104 (11-14-18 @ 13:23)  BP: 94/61 (11-14-18 @ 13:23)  BP(mean): --  RR: 17 (11-14-18 @ 11:28)  SpO2: 96% (11-14-18 @ 11:28)  Wt(kg): --  I and O's:    11-13 @ 07:01  -  11-14 @ 07:00  --------------------------------------------------------  IN: 1185.2 mL / OUT: 2175 mL / NET: -989.8 mL    11-14 @ 07:01  -  11-14 @ 16:26  --------------------------------------------------------  IN: 300 mL / OUT: 800 mL / NET: -500 mL    REVIEW OF SYSTEMS:  Full ROS done and were negative unless otherwise indicated in HPI/assessment.     PHYSICAL EXAM:  Constitutional: NAD  HEENT: + JVD  Respiratory: diminished B/L  Cardiovascular: S1 and S2, irregular  Gastrointestinal: + BS, soft, NT, mildly distended   Extremities: ++ edema  Neurological: AAO x 3, CN 2-12 intact  : No Gustafson  Access: Not applicable    LABS:                        10.4   7.31  )-----------( 111      ( 14 Nov 2018 07:43 )             33.2     11-14    132<L>  |  85<L>  |  18  ----------------------------<  161<H>  3.4<L>   |  34<H>  |  0.74    Ca    7.7<L>      14 Nov 2018 06:00    TPro  6.4  /  Alb  2.7<L>  /  TBili  4.8<H>  /  DBili  3.1<H>  /  AST  61<H>  /  ALT  27  /  AlkPhos  282<H>  11-14    ASSESSMENT/RECOMMEND  63F w/ HTN, DM2, thyroid CA s/p resection, acquired hypoparathyroidism, and severe HFrEF, 11/6/18 a/w vaginal bleeding/MAGNUS/electrolyte derangements/ acute on chronic HFrEF    (1)Renal - MAGNUS - Prerenally mediated from decompensated CHF. Resolved.    (2)Hypokalemia - due to diuresis - improving    (3)Hypomagnesemia - due to diuresis - cont to monitor    (4)Bone - hypocalcemia/hyperphosphatemia due to hypoparathyroidism (s/p iatrogenic parathyroidectomy). Very chronic. Much improved relative to admission.    (5)Hyponatremia - due to decompensated CHF. Much improved since admission    (6)CV - slowly improving, with Primacor-assisted diuresis. She was on Entresto prior to admission.     (7)Elevated HCO3 - likely contraction alkalosis due to diuresis, trend for now     RECOMMEND:  - Primacor gtt + PO Bumex + aldactone per Cardiology (bumex decreased from tid to bid)  - no renal objection to resuming enteresto   	- pt refusing VAD w/u   -Oscal/Phoslo/Calcitriol as ordered  -A/W kcl 40 meq po x 2 doses today  -BMP+Mg+PO4 daily  -Dose new meds for GFR 60ml/min   -no renal objection to discharge planning    Nannette Rice NP  [facility]  [phone]

## 2018-11-14 NOTE — DISCHARGE NOTE ADULT - ADDITIONAL INSTRUCTIONS
Heart Failure Team appointment--11/28, at 9 am Follow up with the Heart Failure Team. An appointment was made for 11/28/18 at 9 am. Bring all paperwork to the appointment. Call [phone] if you have any questions. The clinic is located on [address] [place] [address] [place] [address].

## 2018-11-14 NOTE — DISCHARGE NOTE ADULT - PLAN OF CARE
controlled cont iv Milrinone vi PICC, the drug company will come to teach on Friday @ 10 am ([phone]), cont Bumex, follow up with Heart Failure Team on 11/28/18 @ 9 am stable cont the above meds as prescribed much better now cont current meds follow up blood work follow up with GI doctor cont calcitonin cont current home meds cont iv Milrinone vi PICC, the drug company will come to assess tomorrow @ 10 am ([phone]), and drug will be delivered Friday per CM, cont Bumex, follow up with Heart Failure Team on 11/28/18 @ 9 am Continue IV Milrinone via PICC, the drug company will come to assess. You can call [phone].   You have congestive heart failure which means your heart does not pump as adequately as it should. Weigh yourself daily as small changes in weight can affect your fluid status.   If you gain 3lbs in 3 days, or 5lbs in a week call your Health Care Provider.  Do not eat or drink foods containing more than 2000mg of salt (sodium) in your diet every day.  Call your Health Care Provider if you have any swelling or increased swelling in your feet, ankles, and/or stomach.  Take all of your medication as directed.  If you become dizzy call your Health Care Provider. Stable One of the many causes of acute kidney injury is dehydration. You were treated and your kidney function improved. Avoid nephrotoxic medications (meds that harm the kidneys) including NSAIDs - (ex: Ibuprofen, Advil, Motrin, Celebrex, Naprosyn/Aleve), Intravenous contrast for diagnostic testing, certain antibiotics, and combination cold medications.  Have all medications adjusted for your renal function by your Health Care Provider.  Blood pressure control is important.  Take all medication as prescribed.  Follow up with your primary care physician within 1-2 weeks of discharge. You electrolytes have improved, you are stable for discharge home with continued milrinone IV infusion. Your sodium levels are improved. Follow up with routine blood work with your primary care provider.  HOME CARE INSTRUCTIONS  Only take medicines as directed by your caregiver. Many medicines can make hyponatremia worse. Discuss all your medicines with your caregiver.  Carefully follow any recommended diet, including any fluid restrictions.  You may be asked to repeat lab tests. Follow these directions.  Avoid alcohol and recreational drugs.  SEEK MEDICAL CARE IF:  You develop worsening nausea, fatigue, headache, confusion, or weakness.  Your original hyponatremia symptoms return.  You have problems following the recommended diet.   SEEK IMMEDIATE MEDICAL CARE IF:  You have a seizure.  You faint.  You have ongoing diarrhea or vomiting follow up with your GI doctor Continue calcitonin When you have diabetes, a way to monitor your blood sugars is checking your hemoglobin A1c level. A level greater than 6.5 indicates diabetes, however may be okay when you are taking medications. HgA1C this admission:  Make sure you get your HgA1c checked every three months.  While taking oral diabetes medications, check your blood glucose two times a day.  If you take insulin, check your blood glucose before meals and at bedtime.  It's important not to skip any meals or you risk your blood sugar level dropping too low.   Low blood sugar (hypoglycemia) is a blood sugar below 70mg/dl. Check your blood sugar if you feel signs/symptoms of hypoglycemia. If your blood sugar is below 70 take 15 grams of carbohydrates (ex 4 oz of apple juice, 3-4 glucose tablets, or 4-6 oz of regular soda) wait 15 minutes and repeat blood sugar to make sure it comes up above 70.  If your blood sugar is above 70 and you are due for a meal, have a meal.  If you are not due for a meal have a snack.  This snack helps keeps your blood sugar at a safe range.  Keep a log of your blood glucose results and always take it with you to your doctor appointments.  Keep a list of your current medications including injectables and over the counter medications and bring this medication list with you to all your doctor appointments.  If you have not seen your ophthalmologist this year call for appointment. You should see your eye doctor annually to avoid any eye complications from your diabetes.   Check your feet daily for redness, sores, or openings. Do not self treat any sores or ulcers you notice. If there is no improvement in two days of any lesions, call your primary care physician for an appointment. It is recommended that you follow up with a podiatrist regularly to avoid any vascular complications of your diabetes.

## 2018-11-14 NOTE — PROGRESS NOTE ADULT - SUBJECTIVE AND OBJECTIVE BOX
Subjective: No acute events overnight. Pt with no acute complaints.     Medications:  aspirin  chewable 81 milliGRAM(s) Oral daily  buMETAnide 3 milliGRAM(s) Oral every 12 hours  calcitriol   Capsule 0.5 MICROGram(s) Oral daily  calcium acetate 2001 milliGRAM(s) Oral three times a day with meals  calcium carbonate 1250 mG  + Vitamin D (OsCal 500 + D) 2 Tablet(s) Oral three times a day  cefpodoxime 200 milliGRAM(s) Oral every 12 hours  gabapentin 200 milliGRAM(s) Oral three times a day  hydrALAZINE 10 milliGRAM(s) Oral every 8 hours  influenza   Vaccine 0.5 milliLiter(s) IntraMuscular once  insulin lispro (HumaLOG) corrective regimen sliding scale   SubCutaneous three times a day before meals  insulin lispro (HumaLOG) corrective regimen sliding scale   SubCutaneous at bedtime  levothyroxine 175 MICROGram(s) Oral daily  milrinone Infusion 0.25 MICROgram(s)/kG/Min IV Continuous <Continuous>  rifaximin 550 milliGRAM(s) Oral two times a day  spironolactone 25 milliGRAM(s) Oral daily    Vitals:  T(C): 37.3 (18 @ 11:28), Max: 37.3 (18 @ 11:28)  HR: 104 (18 @ 13:23) (102 - 108)  BP: 94/61 (18 @ 13:23) (93/59 - 108/70)  RR: 17 (18 @ 11:28) (17 - 18)  SpO2: 96% (18 @ 11:28) (96% - 97%)      Daily Weight in k.1 (2018 10:16)        I&O's Summary    2018 07:01  -  2018 07:00  --------------------------------------------------------  IN: 1185.2 mL / OUT: 2175 mL / NET: -989.8 mL    2018 07:01  -  2018 14:37  --------------------------------------------------------  IN: 300 mL / OUT: 800 mL / NET: -500 mL        Physical Exam:  Appearance: No Acute Distress  HEENT: JVP not elevated   Cardiovascular: RRR, Normal S1 S2  Respiratory: Clear to auscultation bilaterally  Gastrointestinal: Soft, Non-tender, non-distended	  Skin: no skin lesions  Neurologic: Non-focal, +asterixis   Extremities: No LE edema, warm and well perfused  Psychiatry: A & O x 3, Mood & affect appropriate      Labs:                        10.4   7.31  )-----------( 111      ( 2018 07:43 )             33.2         132<L>  |  85<L>  |  18  ----------------------------<  161<H>  3.4<L>   |  34<H>  |  0.74    Ca    7.7<L>      2018 06:00    TPro  6.4  /  Alb  2.7<L>  /  TBili  4.8<H>  /  DBili  3.1<H>  /  AST  61<H>  /  ALT  27  /  AlkPhos  282<H>        PT/INR - ( 2018 08:58 )   PT: 18.5 sec;   INR: 1.60 ratio      Serum Pro-Brain Natriuretic Peptide: 2414 pg/mL ( @ 04:04)

## 2018-11-14 NOTE — DISCHARGE NOTE ADULT - PATIENT PORTAL LINK FT
You can access the [facility] Patient Portal, offered by [facility], by registering with the following website: [email or web address]

## 2018-11-14 NOTE — DISCHARGE NOTE ADULT - HOSPITAL COURSE
64yo post-menopausal F w/ pmh of CHF, thyroid ca, HTN, DM, daily ASA user,  c/o vaginal bleeding that she noticed this morning. Pt states she woke up feeling fatigued with abdominal pressure and that when she sat down, she noticed that she had bled through her underwear. Pt has not experienced any bleeding episodes like this in the past. she admits to normal urinary and bowel functions and denies any HA, dizziness, SOB, CP, N/V/D, constipation. Pt has no known uterine pathologies and has not seen a GYN in 15-20 years. Currently Pt reports fatigue and daughter states she has been in this state for the past couple weeks since she was discharged from hospital for CHF exacerbation.   Treated with iv Bumex, Dobutamine then Milrinone . A  PICC line was placed and she will cont iv Milrinone at home with po Bumex. 62yo post-menopausal F w/ pmh of CHF, thyroid ca, HTN, DM, daily ASA user,  c/o vaginal bleeding that she noticed this morning. Pt states she woke up feeling fatigued with abdominal pressure and that when she sat down, she noticed that she had bled through her underwear. Pt has not experienced any bleeding episodes like this in the past. she admits to normal urinary and bowel functions and denies any HA, dizziness, SOB, CP, N/V/D, constipation. Pt has no known uterine pathologies and has not seen a GYN in 15-20 years. Currently Pt reports fatigue and daughter states she has been in this state for the past couple weeks since she was discharged from hospital for CHF exacerbation.   Treated with iv Bumex, Dobutamine then Milrinone . A  PICC line was placed and she will cont iv Milrinone at home with po Bumex. She was seen by Heart Failure Team, card, renal, GI, Psych, EP, Palliative care and Heart Failure Team was following her all along during this admission. 64 y/o post-menopausal F w/ PMHx of CHF, thyroid CA, HTN, DM, daily ASA user,  c/o vaginal bleeding that she noticed this morning. Pt states she woke up feeling fatigued with abdominal pressure and that when she sat down, she noticed that she had bled through her underwear. Pt has not experienced any bleeding episodes like this in the past. she admits to normal urinary and bowel functions and denies any HA, dizziness, SOB, CP, N/V/D, constipation. Pt has no known uterine pathologies and has not seen a GYN in 15-20 years. Currently Pt reports fatigue and daughter states she has been in this state for the past couple weeks since she was discharged from hospital for CHF exacerbation.   Treated with iv Bumex, Dobutamine then Milrinone . A  PICC line was placed and she will cont iv Milrinone at home with po Bumex. She was seen by Heart Failure Team, card, renal, GI, Psych, EP, Palliative care and Heart Failure Team was following her all along during this admission.

## 2018-11-14 NOTE — PROGRESS NOTE ADULT - ASSESSMENT
62yo post-menopausal F w/ pmh of CHF, thyroid ca, HTN, DM, daily ASA user,  c/o vaginal bleeding that she noticed this morning. Pt states she woke up feeling fatigued with abdominal pressure and that when she sat down, she noticed that she had bled through her underwear. Pt has not experienced any bleeding episodes like this in the past. she admits to normal urinary and bowel functions and denies any HA, dizziness, SOB, CP, N/V/D, constipation. Pt has no known uterine pathologies and has not seen a GYN in 15-20 years. Currently Pt reports fatigue and daughter states she has been in this state for the past couple weeks since she was discharged from hospital for CHF exacerbation.      Problem/Plan - 1:  ·  Problem: Acute on chronic systolic congestive heart failure.  Plan: Improving. IV Bumex and Milrinone  . Cardiology and CHF team helping. S/P PICC Line.     Problem/Plan - 2:  ·  Problem: Acute renal failure.  Plan: Creatinine better.  Renal following .     Problem/Plan - 3:  ·  Problem: Hyperkalemia/ Hypokalemia/ hypomagnesemia  .  Plan: Correcting.      Problem/Plan - 4:  ·  Problem: Hyponatremia.  Plan: Correcting . Management per renal.      Problem/Plan - 5:  ·  Problem:  Hypocalcemia.  Plan: Corrected.  Replacing IV and PO ..      Problem/Plan - 6:  Problem:  Jaundice, hepatocellular. Plan: Likely from CHF . Monitoring LFT .GI following .     Problem/Plan - 7:  ·  Problem: DM (diabetes mellitus).  Plan: SSI and Lantus .Sugars in good control.      Problem/Plan - 8:  ·  Problem: UTI .  Plan: IV /PO Abxs for 5 days .     Problem/Plan - 9:  ·  Problem: Hypothyroidism.  Plan: Synthroid home dose and will check TFT in am.      Problem/Plan - 10:  Problem: Vaginal bleeding. Plan; GYn consult consulted and awaiting Endometrial BX so will reconsult GYN.       Problem/Plan - 11:  ·  Problem: Coagulopathy .  Plan: Sec to Liver injury. INR better. Holding DVT prophylaxis as vaginal bleeding.       Problem/Plan - 12:  ·  Problem: Feet pain sec to Diabetic Neuropathy  .  Plan: Increased  Neurontin. Normal  B12 and Folate as well TFT.      Problem/Plan - 13:  ·  Problem: Mod to Severe MR and Severe TR .  Plan: Structural Heart team following.     Disposition : DC planning home tomorrow.

## 2018-11-14 NOTE — PROGRESS NOTE ADULT - SUBJECTIVE AND OBJECTIVE BOX
Interval events: pt seen and examined. She denies abdominal pain, n/v. Tolerating PO well.     MEDICATIONS  (STANDING):  aspirin  chewable 81 milliGRAM(s) Oral daily  buMETAnide 3 milliGRAM(s) Oral every 12 hours  calcitriol   Capsule 0.5 MICROGram(s) Oral daily  calcium acetate 2001 milliGRAM(s) Oral three times a day with meals  calcium carbonate 1250 mG  + Vitamin D (OsCal 500 + D) 2 Tablet(s) Oral three times a day  cefpodoxime 200 milliGRAM(s) Oral every 12 hours  gabapentin 200 milliGRAM(s) Oral three times a day  hydrALAZINE 10 milliGRAM(s) Oral every 8 hours  influenza   Vaccine 0.5 milliLiter(s) IntraMuscular once  insulin lispro (HumaLOG) corrective regimen sliding scale   SubCutaneous three times a day before meals  insulin lispro (HumaLOG) corrective regimen sliding scale   SubCutaneous at bedtime  levothyroxine 175 MICROGram(s) Oral daily  milrinone Infusion 0.25 MICROgram(s)/kG/Min (7.26 mL/Hr) IV Continuous <Continuous>  potassium chloride    Tablet ER 40 milliEquivalent(s) Oral every 4 hours  rifaximin 550 milliGRAM(s) Oral two times a day  spironolactone 25 milliGRAM(s) Oral daily    MEDICATIONS  (PRN):      Allergies    Isordil (Headache)  penicillin (Rash)    Intolerances        Review of Systems:    General:  No wt loss, fevers, chills, night sweats,fatigue,   Eyes:  Good vision, no reported pain  ENT:  No sore throat, pain, runny nose, dysphagia  CV:  No pain, palpitations, hypo/hypertension  Resp:  No dyspnea, cough, tachypnea, wheezing  GI:  No pain, No nausea, No vomiting, No diarrhea, No constipation, No weight loss, No fever, No pruritis, No rectal bleeding, No melena, No dysphagia  :  No pain, bleeding, incontinence, nocturia  Muscle:  No pain, weakness  Neuro:  No weakness, tingling, memory problems  Psych:  No fatigue, insomnia, mood problems, depression  Endocrine:  No polyuria, polydypsia, cold/heat intolerance  Heme:  No petechiae, ecchymosis, easy bruisability  Skin:  No rash, tattoos, scars, edema      Vital Signs Last 24 Hrs  T(C): 37.3 (14 Nov 2018 11:28), Max: 37.3 (14 Nov 2018 11:28)  T(F): 99.1 (14 Nov 2018 11:28), Max: 99.1 (14 Nov 2018 11:28)  HR: 103 (14 Nov 2018 11:28) (102 - 108)  BP: 93/59 (14 Nov 2018 11:28) (93/59 - 112/77)  BP(mean): --  RR: 17 (14 Nov 2018 11:28) (17 - 18)  SpO2: 96% (14 Nov 2018 11:28) (96% - 100%)    PHYSICAL EXAM:    Constitutional: NAD, well-developed  HEENT: EOMI, throat clear  Neck: No LAD, supple  Respiratory: CTA and P  Cardiovascular: S1 and S2, RRR, no M  Gastrointestinal: BS+, soft, NT/ND, neg HSM,  Extremities: No peripheral edema, neg clubing, cyanosis  Vascular: 2+ peripheral pulses  Neurological: A/O x 3, no focal deficits  Psychiatric: Normal mood, normal affect  Skin: No rashes      LABS:                        10.4   7.31  )-----------( 111      ( 14 Nov 2018 07:43 )             33.2     11-14    132<L>  |  85<L>  |  18  ----------------------------<  161<H>  3.4<L>   |  34<H>  |  0.74    Ca    7.7<L>      14 Nov 2018 06:00    TPro  6.4  /  Alb  2.7<L>  /  TBili  4.8<H>  /  DBili  3.1<H>  /  AST  61<H>  /  ALT  27  /  AlkPhos  282<H>  11-14    PT/INR - ( 14 Nov 2018 08:58 )   PT: 18.5 sec;   INR: 1.60 ratio               RADIOLOGY & ADDITIONAL TESTS:

## 2018-11-14 NOTE — DISCHARGE NOTE ADULT - CARE PLAN
Principal Discharge DX:	Acute on chronic systolic congestive heart failure  Goal:	controlled  Assessment and plan of treatment:	cont iv Milrinone vi PICC, the drug company will come to teach on Friday @ 10 am ([phone]), cont Bumex, follow up with Heart Failure Team on 11/28/18 @ 9 am  Secondary Diagnosis:	Acute renal failure  Goal:	stable  Assessment and plan of treatment:	cont the above meds as prescribed  Secondary Diagnosis:	Electrolyte imbalance  Goal:	much better now  Assessment and plan of treatment:	cont current meds  Secondary Diagnosis:	Hyponatremia  Assessment and plan of treatment:	follow up blood work  Secondary Diagnosis:	Jaundice, hepatocellular  Assessment and plan of treatment:	follow up with GI doctor  Secondary Diagnosis:	Hypocalcemia  Goal:	stable  Assessment and plan of treatment:	cont calcitonin  Secondary Diagnosis:	Type 2 diabetes mellitus without complication, without long-term current use of insulin  Goal:	controlled  Assessment and plan of treatment:	cont current home meds Principal Discharge DX:	Acute on chronic systolic congestive heart failure  Goal:	controlled  Assessment and plan of treatment:	cont iv Milrinone vi PICC, the drug company will come to assess tomorrow @ 10 am ([phone]), and drug will be delivered Friday per CM, cont Bumex, follow up with Heart Failure Team on 11/28/18 @ 9 am  Secondary Diagnosis:	Acute renal failure  Goal:	stable  Assessment and plan of treatment:	cont the above meds as prescribed  Secondary Diagnosis:	Electrolyte imbalance  Goal:	much better now  Assessment and plan of treatment:	cont current meds  Secondary Diagnosis:	Hyponatremia  Assessment and plan of treatment:	follow up blood work  Secondary Diagnosis:	Jaundice, hepatocellular  Assessment and plan of treatment:	follow up with GI doctor  Secondary Diagnosis:	Hypocalcemia  Goal:	stable  Assessment and plan of treatment:	cont calcitonin  Secondary Diagnosis:	Type 2 diabetes mellitus without complication, without long-term current use of insulin  Goal:	controlled  Assessment and plan of treatment:	cont current home meds Principal Discharge DX:	Acute on chronic systolic congestive heart failure  Goal:	controlled  Assessment and plan of treatment:	Continue IV Milrinone via PICC, the drug company will come to assess. You can call [phone].   You have congestive heart failure which means your heart does not pump as adequately as it should. Weigh yourself daily as small changes in weight can affect your fluid status.   If you gain 3lbs in 3 days, or 5lbs in a week call your Health Care Provider.  Do not eat or drink foods containing more than 2000mg of salt (sodium) in your diet every day.  Call your Health Care Provider if you have any swelling or increased swelling in your feet, ankles, and/or stomach.  Take all of your medication as directed.  If you become dizzy call your Health Care Provider.  Secondary Diagnosis:	Acute renal failure  Goal:	Stable  Assessment and plan of treatment:	One of the many causes of acute kidney injury is dehydration. You were treated and your kidney function improved. Avoid nephrotoxic medications (meds that harm the kidneys) including NSAIDs - (ex: Ibuprofen, Advil, Motrin, Celebrex, Naprosyn/Aleve), Intravenous contrast for diagnostic testing, certain antibiotics, and combination cold medications.  Have all medications adjusted for your renal function by your Health Care Provider.  Blood pressure control is important.  Take all medication as prescribed.  Follow up with your primary care physician within 1-2 weeks of discharge.  Secondary Diagnosis:	Electrolyte imbalance  Goal:	Stable  Assessment and plan of treatment:	You electrolytes have improved, you are stable for discharge home with continued milrinone IV infusion.  Secondary Diagnosis:	Hyponatremia  Assessment and plan of treatment:	Your sodium levels are improved. Follow up with routine blood work with your primary care provider.  HOME CARE INSTRUCTIONS  Only take medicines as directed by your caregiver. Many medicines can make hyponatremia worse. Discuss all your medicines with your caregiver.  Carefully follow any recommended diet, including any fluid restrictions.  You may be asked to repeat lab tests. Follow these directions.  Avoid alcohol and recreational drugs.  SEEK MEDICAL CARE IF:  You develop worsening nausea, fatigue, headache, confusion, or weakness.  Your original hyponatremia symptoms return.  You have problems following the recommended diet.   SEEK IMMEDIATE MEDICAL CARE IF:  You have a seizure.  You faint.  You have ongoing diarrhea or vomiting  Secondary Diagnosis:	Jaundice, hepatocellular  Assessment and plan of treatment:	follow up with your GI doctor  Secondary Diagnosis:	Hypocalcemia  Goal:	stable  Assessment and plan of treatment:	Continue calcitonin  Secondary Diagnosis:	Type 2 diabetes mellitus without complication, without long-term current use of insulin  Goal:	controlled  Assessment and plan of treatment:	When you have diabetes, a way to monitor your blood sugars is checking your hemoglobin A1c level. A level greater than 6.5 indicates diabetes, however may be okay when you are taking medications. HgA1C this admission:  Make sure you get your HgA1c checked every three months.  While taking oral diabetes medications, check your blood glucose two times a day.  If you take insulin, check your blood glucose before meals and at bedtime.  It's important not to skip any meals or you risk your blood sugar level dropping too low.   Low blood sugar (hypoglycemia) is a blood sugar below 70mg/dl. Check your blood sugar if you feel signs/symptoms of hypoglycemia. If your blood sugar is below 70 take 15 grams of carbohydrates (ex 4 oz of apple juice, 3-4 glucose tablets, or 4-6 oz of regular soda) wait 15 minutes and repeat blood sugar to make sure it comes up above 70.  If your blood sugar is above 70 and you are due for a meal, have a meal.  If you are not due for a meal have a snack.  This snack helps keeps your blood sugar at a safe range.  Keep a log of your blood glucose results and always take it with you to your doctor appointments.  Keep a list of your current medications including injectables and over the counter medications and bring this medication list with you to all your doctor appointments.  If you have not seen your ophthalmologist this year call for appointment. You should see your eye doctor annually to avoid any eye complications from your diabetes.   Check your feet daily for redness, sores, or openings. Do not self treat any sores or ulcers you notice. If there is no improvement in two days of any lesions, call your primary care physician for an appointment. It is recommended that you follow up with a podiatrist regularly to avoid any vascular complications of your diabetes.

## 2018-11-14 NOTE — DISCHARGE NOTE ADULT - PROVIDER TOKENS
TOKEN:'[id number]' TOKEN:'[id number]',FREE:[LAST:[Heart Failure Team],PHONE:[(   )    -],FAX:[(   )    -],ADDRESS:[[address] [place], [place] [id number]]]

## 2018-11-14 NOTE — PROGRESS NOTE ADULT - ATTENDING COMMENTS
Briefly, 62 year old F h/o HTN, HLD, DM II, thyroid cancer s/p thyroidectomy c/b hypocalcemia, HFrEF (LVEF 10%, LVIDd 6.6 cm; first diagnosed in 2013), s/p CRT-D placement in June 2017 with multiple recurrent admissions for cardiogenic shock requiring inotropes who presented with vaginal bleeding in setting of supratherapeutic INR which was 2/2 congestive hepatopathy. Was found to be likely low output heart failure and started on inotropes with diuretics with improvement. Follows with Dr. Robbins. On exam, JVD approx 12 cm, RRR, grade II/VI systolic murmur, S3, CTAB, nontender abdomen, trace edema, asterixis. Labs reviewed - K 3.4, BUN/Cr 18/0.7, TBili 4.3 (downtrending), Ca 8.6. RHC on 5/25/18 which showed RA of 28, PCWP 37, PA 57/39/46 , EDP 23, PA sat 34.7%, CO 2.72, CI 1.21. TTE reviewed - EF 10%, LVEDD 6.6 cm, mod MR, severe TR (malcoaptation). Overall stage D HF, NYHA class IV with plan for home inotropes via PICC as patient adamant about not being evaluated for LVAD currently.   - volume overloaded but improved; switch to bumex 3 mg PO q12  - start marcel 25 mg daily  - standing weights daily  - ideally would increase milrinone to 0.375 mcg/kg/min; will start hydral 10 mg q8h for now  - has asterixis and likely cirrhosis; check ammonia; lactulose/rifaximin as above  - will arrange close outpt f/u with Dr. Gallego who she would like to see when ready for discharge  - possible d/c Thursday

## 2018-11-15 LAB
AMMONIA BLD-MCNC: 46 UMOL/L — SIGNIFICANT CHANGE UP (ref 11–55)
ANION GAP SERPL CALC-SCNC: 15 MMOL/L — SIGNIFICANT CHANGE UP (ref 5–17)
BUN SERPL-MCNC: 15 MG/DL — SIGNIFICANT CHANGE UP (ref 7–23)
CALCIUM SERPL-MCNC: 7.3 MG/DL — LOW (ref 8.4–10.5)
CHLORIDE SERPL-SCNC: 85 MMOL/L — LOW (ref 96–108)
CO2 SERPL-SCNC: 33 MMOL/L — HIGH (ref 22–31)
CREAT SERPL-MCNC: 0.78 MG/DL — SIGNIFICANT CHANGE UP (ref 0.5–1.3)
GLUCOSE BLDC GLUCOMTR-MCNC: 103 MG/DL — HIGH (ref 70–99)
GLUCOSE BLDC GLUCOMTR-MCNC: 163 MG/DL — HIGH (ref 70–99)
GLUCOSE BLDC GLUCOMTR-MCNC: 169 MG/DL — HIGH (ref 70–99)
GLUCOSE BLDC GLUCOMTR-MCNC: 181 MG/DL — HIGH (ref 70–99)
GLUCOSE BLDC GLUCOMTR-MCNC: 205 MG/DL — HIGH (ref 70–99)
GLUCOSE SERPL-MCNC: 147 MG/DL — HIGH (ref 70–99)
HCT VFR BLD CALC: 31.7 % — LOW (ref 34.5–45)
HGB BLD-MCNC: 10 G/DL — LOW (ref 11.5–15.5)
MAGNESIUM SERPL-MCNC: 1.7 MG/DL — SIGNIFICANT CHANGE UP (ref 1.6–2.6)
MCHC RBC-ENTMCNC: 22.8 PG — LOW (ref 27–34)
MCHC RBC-ENTMCNC: 31.5 GM/DL — LOW (ref 32–36)
MCV RBC AUTO: 72.2 FL — LOW (ref 80–100)
PHOSPHATE SERPL-MCNC: 3.8 MG/DL — SIGNIFICANT CHANGE UP (ref 2.5–4.5)
PLATELET # BLD AUTO: 108 K/UL — LOW (ref 150–400)
POTASSIUM SERPL-MCNC: 3.9 MMOL/L — SIGNIFICANT CHANGE UP (ref 3.5–5.3)
POTASSIUM SERPL-SCNC: 3.9 MMOL/L — SIGNIFICANT CHANGE UP (ref 3.5–5.3)
RBC # BLD: 4.39 M/UL — SIGNIFICANT CHANGE UP (ref 3.8–5.2)
RBC # FLD: 26 % — HIGH (ref 10.3–14.5)
SODIUM SERPL-SCNC: 133 MMOL/L — LOW (ref 135–145)
WBC # BLD: 8.49 K/UL — SIGNIFICANT CHANGE UP (ref 3.8–10.5)
WBC # FLD AUTO: 8.49 K/UL — SIGNIFICANT CHANGE UP (ref 3.8–10.5)

## 2018-11-15 PROCEDURE — 99233 SBSQ HOSP IP/OBS HIGH 50: CPT | Mod: GC

## 2018-11-15 RX ORDER — MAGNESIUM SULFATE 500 MG/ML
2 VIAL (ML) INJECTION ONCE
Qty: 0 | Refills: 0 | Status: COMPLETED | OUTPATIENT
Start: 2018-11-15 | End: 2018-11-15

## 2018-11-15 RX ORDER — BUMETANIDE 0.25 MG/ML
4 INJECTION INTRAMUSCULAR; INTRAVENOUS EVERY 12 HOURS
Qty: 0 | Refills: 0 | Status: DISCONTINUED | OUTPATIENT
Start: 2018-11-15 | End: 2018-11-15

## 2018-11-15 RX ORDER — BUMETANIDE 0.25 MG/ML
6 INJECTION INTRAMUSCULAR; INTRAVENOUS ONCE
Qty: 0 | Refills: 0 | Status: COMPLETED | OUTPATIENT
Start: 2018-11-15 | End: 2018-11-15

## 2018-11-15 RX ORDER — BUMETANIDE 0.25 MG/ML
2 INJECTION INTRAMUSCULAR; INTRAVENOUS EVERY 12 HOURS
Qty: 0 | Refills: 0 | Status: DISCONTINUED | OUTPATIENT
Start: 2018-11-15 | End: 2018-11-15

## 2018-11-15 RX ORDER — BUMETANIDE 0.25 MG/ML
1 INJECTION INTRAMUSCULAR; INTRAVENOUS
Qty: 20 | Refills: 0 | Status: DISCONTINUED | OUTPATIENT
Start: 2018-11-15 | End: 2018-11-17

## 2018-11-15 RX ORDER — POTASSIUM CHLORIDE 20 MEQ
40 PACKET (EA) ORAL ONCE
Qty: 0 | Refills: 0 | Status: COMPLETED | OUTPATIENT
Start: 2018-11-15 | End: 2018-11-15

## 2018-11-15 RX ADMIN — Medication 200 MILLIGRAM(S): at 04:47

## 2018-11-15 RX ADMIN — Medication 81 MILLIGRAM(S): at 13:42

## 2018-11-15 RX ADMIN — Medication 1: at 13:46

## 2018-11-15 RX ADMIN — Medication 50 GRAM(S): at 01:06

## 2018-11-15 RX ADMIN — Medication 2001 MILLIGRAM(S): at 09:16

## 2018-11-15 RX ADMIN — BUMETANIDE 132 MILLIGRAM(S): 0.25 INJECTION INTRAMUSCULAR; INTRAVENOUS at 21:10

## 2018-11-15 RX ADMIN — SPIRONOLACTONE 25 MILLIGRAM(S): 25 TABLET, FILM COATED ORAL at 04:47

## 2018-11-15 RX ADMIN — GABAPENTIN 200 MILLIGRAM(S): 400 CAPSULE ORAL at 21:13

## 2018-11-15 RX ADMIN — BUMETANIDE 6 MILLIGRAM(S): 0.25 INJECTION INTRAMUSCULAR; INTRAVENOUS at 13:42

## 2018-11-15 RX ADMIN — Medication 2001 MILLIGRAM(S): at 13:45

## 2018-11-15 RX ADMIN — Medication 0: at 21:22

## 2018-11-15 RX ADMIN — Medication 2001 MILLIGRAM(S): at 18:15

## 2018-11-15 RX ADMIN — BUMETANIDE 3 MILLIGRAM(S): 0.25 INJECTION INTRAMUSCULAR; INTRAVENOUS at 04:46

## 2018-11-15 RX ADMIN — GABAPENTIN 200 MILLIGRAM(S): 400 CAPSULE ORAL at 04:46

## 2018-11-15 RX ADMIN — Medication 1: at 08:28

## 2018-11-15 RX ADMIN — Medication 40 MILLIEQUIVALENT(S): at 01:07

## 2018-11-15 RX ADMIN — Medication 2 TABLET(S): at 21:12

## 2018-11-15 RX ADMIN — Medication 175 MICROGRAM(S): at 04:46

## 2018-11-15 RX ADMIN — Medication 200 MILLIGRAM(S): at 18:15

## 2018-11-15 RX ADMIN — GABAPENTIN 200 MILLIGRAM(S): 400 CAPSULE ORAL at 13:42

## 2018-11-15 RX ADMIN — BUMETANIDE 5 MG/HR: 0.25 INJECTION INTRAMUSCULAR; INTRAVENOUS at 22:33

## 2018-11-15 RX ADMIN — Medication 2 TABLET(S): at 13:42

## 2018-11-15 RX ADMIN — Medication 2 TABLET(S): at 09:16

## 2018-11-15 RX ADMIN — CALCITRIOL 0.5 MICROGRAM(S): 0.5 CAPSULE ORAL at 13:42

## 2018-11-15 NOTE — PROVIDER CONTACT NOTE (OTHER) - BACKGROUND
Admitted for "vaginal bleeding for to be in ADHF, MAGNUS with severe hyponatremia 2/2 thiazide use."

## 2018-11-15 NOTE — PROGRESS NOTE ADULT - SUBJECTIVE AND OBJECTIVE BOX
INTERVAL HPI/OVERNIGHT EVENTS: Gained weight since yesterday .   Vital Signs Last 24 Hrs  T(C): 36.7 (15 Nov 2018 11:37), Max: 37.2 (14 Nov 2018 22:34)  T(F): 98.1 (15 Nov 2018 11:37), Max: 98.9 (14 Nov 2018 22:34)  HR: 95 (15 Nov 2018 11:37) (95 - 111)  BP: 105/75 (15 Nov 2018 11:37) (88/58 - 111/74)  BP(mean): --  RR: 17 (15 Nov 2018 11:37) (17 - 18)  SpO2: 96% (15 Nov 2018 11:37) (93% - 99%)  I&O's Summary    14 Nov 2018 07:01  -  15 Nov 2018 07:00  --------------------------------------------------------  IN: 720 mL / OUT: 1600 mL / NET: -880 mL      MEDICATIONS  (STANDING):  aspirin  chewable 81 milliGRAM(s) Oral daily  buMETAnide 6 milliGRAM(s) Oral once  calcitriol   Capsule 0.5 MICROGram(s) Oral daily  calcium acetate 2001 milliGRAM(s) Oral three times a day with meals  calcium carbonate 1250 mG  + Vitamin D (OsCal 500 + D) 2 Tablet(s) Oral three times a day  cefpodoxime 200 milliGRAM(s) Oral every 12 hours  gabapentin 200 milliGRAM(s) Oral three times a day  influenza   Vaccine 0.5 milliLiter(s) IntraMuscular once  insulin lispro (HumaLOG) corrective regimen sliding scale   SubCutaneous three times a day before meals  insulin lispro (HumaLOG) corrective regimen sliding scale   SubCutaneous at bedtime  levothyroxine 175 MICROGram(s) Oral daily  milrinone Infusion 0.25 MICROgram(s)/kG/Min (7.26 mL/Hr) IV Continuous <Continuous>  rifaximin 550 milliGRAM(s) Oral two times a day  spironolactone 25 milliGRAM(s) Oral daily    MEDICATIONS  (PRN):    LABS:                        10.0   8.49  )-----------( 108      ( 15 Nov 2018 07:53 )             31.7     11-15    133<L>  |  85<L>  |  15  ----------------------------<  147<H>  3.9   |  33<H>  |  0.78    Ca    7.3<L>      15 Nov 2018 06:34  Phos  3.8     11-15  Mg     1.7     11-15    TPro  6.4  /  Alb  2.7<L>  /  TBili  4.8<H>  /  DBili  3.1<H>  /  AST  61<H>  /  ALT  27  /  AlkPhos  282<H>  11-14    PT/INR - ( 14 Nov 2018 08:58 )   PT: 18.5 sec;   INR: 1.60 ratio             CAPILLARY BLOOD GLUCOSE      POCT Blood Glucose.: 205 mg/dL (15 Nov 2018 11:55)  POCT Blood Glucose.: 163 mg/dL (15 Nov 2018 07:51)  POCT Blood Glucose.: 200 mg/dL (14 Nov 2018 21:39)  POCT Blood Glucose.: 248 mg/dL (14 Nov 2018 16:47)          REVIEW OF SYSTEMS:  CONSTITUTIONAL: No fever, weight loss, or fatigue  EYES: No eye pain, visual disturbances, or discharge  ENMT:  No difficulty hearing, tinnitus, vertigo; No sinus or throat pain  NECK: No pain or stiffness  RESPIRATORY: No cough, wheezing, chills or hemoptysis; No shortness of breath  CARDIOVASCULAR: No chest pain, palpitations, dizziness, or leg swelling  GASTROINTESTINAL: No abdominal or epigastric pain. No nausea, vomiting, or hematemesis; No diarrhea or constipation. No melena or hematochezia.  GENITOURINARY: No dysuria, frequency, hematuria, or incontinence  NEUROLOGICAL: No headaches, memory loss, loss of strength, numbness, or tremors      Consultant(s) Notes Reviewed:  [x ] YES  [ ] NO    PHYSICAL EXAM:  GENERAL: NAD, well-groomed, well-developed ,not in any distress ,  HEAD:  Atraumatic, Normocephalic  NECK: Supple, ++ JVD, Normal thyroid  NERVOUS SYSTEM:  Alert & Oriented X3, No focal deficit   CHEST/LUNG: Good air entry bilateral with no  rales, rhonchi, wheezing, or rubs  HEART: Regular rate and rhythm; No murmurs, rubs, or gallops  ABDOMEN: Soft, Nontender, Nondistended; Bowel sounds present  EXTREMITIES:  2+ Peripheral Pulses, No clubbing, cyanosis, but +edema    Care Discussed with Consultants/Other Providers [ x] YES  [ ] NO

## 2018-11-15 NOTE — PROGRESS NOTE ADULT - ATTENDING COMMENTS
Briefly, 62 year old F h/o HTN, HLD, DM II, thyroid cancer s/p thyroidectomy c/b hypocalcemia, HFrEF (LVEF 10%, LVIDd 6.6 cm; first diagnosed in 2013), s/p CRT-D placement in June 2017 with multiple recurrent admissions for cardiogenic shock requiring inotropes who presented with vaginal bleeding in setting of supratherapeutic INR which was 2/2 congestive hepatopathy. Was found to be likely low output heart failure and started on inotropes with diuretics with improvement. Follows with Dr. Robbins. Switched to oral diuretics with inadequate response and weight actually increased. On exam, JVD approx 18 cm, RRR, grade II/VI systolic murmur, S3, CTAB, nontender abdomen, trace edema, asterixis. Labs reviewed - K 3.9, BUN/Cr 18/0.7, TBili 4.3 (downtrending), Ca 8.6. RHC on 5/25/18 which showed RA of 28, PCWP 37, PA 57/39/46 , EDP 23, PA sat 34.7%, CO 2.72, CI 1.21. TTE reviewed - EF 10%, LVEDD 6.6 cm, mod MR, severe TR (malcoaptation). Overall stage D HF, NYHA class IV with plan for home inotropes via PICC as patient adamant about not being evaluated for LVAD currently.   - volume overloaded; discussed with patient that she will not do well if she is discharged today as she did not respond well to oral diuretics; start bumex 4 mg IV q12; if inadequate response, place on bumex gtt at 1mg/hr  - continue marcel 25 mg daily  - standing weights daily  - ideally would increase milrinone to 0.375 mcg/kg/min  - d/c hydral given hypotension  - has asterixis and likely cirrhosis; ammonia wnl; lactulose/rifaximin as above  - advised pt against leaving as she is decompensated; has agreed to stay for now

## 2018-11-15 NOTE — PROGRESS NOTE ADULT - ASSESSMENT
62yo post-menopausal F w/ pmh of CHF, thyroid ca, HTN, DM, daily ASA user,  c/o vaginal bleeding that she noticed this morning. Pt states she woke up feeling fatigued with abdominal pressure and that when she sat down, she noticed that she had bled through her underwear. Pt has not experienced any bleeding episodes like this in the past. she admits to normal urinary and bowel functions and denies any HA, dizziness, SOB, CP, N/V/D, constipation. Pt has no known uterine pathologies and has not seen a GYN in 15-20 years. Currently Pt reports fatigue and daughter states she has been in this state for the past couple weeks since she was discharged from hospital for CHF exacerbation.      Problem/Plan - 1:  ·  Problem: Acute on chronic systolic congestive heart failure.  Plan: Heart Failure team helping . D/W attending and giving Bumex 6mg now and seeing response .  S/P PICC Line.     Problem/Plan - 2:  ·  Problem: Acute renal failure.  Plan: Creatinine better.  Renal following .     Problem/Plan - 3:  ·  Problem: Hyperkalemia/ Hypokalemia/ hypomagnesemia  .  Plan: Correcting.      Problem/Plan - 4:  ·  Problem: Hyponatremia.  Plan: Correcting . Management per renal.      Problem/Plan - 5:  ·  Problem:  Hypocalcemia.  Plan: Corrected.  Replacing IV and PO ..      Problem/Plan - 6:  Problem:  Jaundice, hepatocellular. Plan: Likely from CHF . Monitoring LFT .GI following .     Problem/Plan - 7:  ·  Problem: DM (diabetes mellitus).  Plan: SSI and Lantus .Sugars in good control.      Problem/Plan - 8:  ·  Problem: UTI .  Plan: IV /PO Abxs for 5 days .     Problem/Plan - 9:  ·  Problem: Hypothyroidism.  Plan: Synthroid home dose and will check TFT in am.      Problem/Plan - 10:  Problem: Vaginal bleeding. Plan; GYn consult consulted and awaiting Endometrial BX so will reconsult GYN.       Problem/Plan - 11:  ·  Problem: Coagulopathy .  Plan: Sec to Liver injury. INR better. Holding DVT prophylaxis as vaginal bleeding.       Problem/Plan - 12:  ·  Problem: Feet pain sec to Diabetic Neuropathy  .  Plan: Resolved . Increased  Neurontin. Normal  B12 and Folate as well TFT.      Problem/Plan - 13:  ·  Problem: Mod to Severe MR and Severe TR .  Plan: Structural Heart team following.     Disposition : DC planning home in next few days .

## 2018-11-15 NOTE — PROGRESS NOTE ADULT - SUBJECTIVE AND OBJECTIVE BOX
Patient with no anginal chest pain or shortness of breath    aspirin  chewable 81 milliGRAM(s) Oral daily  buMETAnide 3 milliGRAM(s) Oral every 12 hours  calcitriol   Capsule 0.5 MICROGram(s) Oral daily  calcium acetate 2001 milliGRAM(s) Oral three times a day with meals  calcium carbonate 1250 mG  + Vitamin D (OsCal 500 + D) 2 Tablet(s) Oral three times a day  cefpodoxime 200 milliGRAM(s) Oral every 12 hours  gabapentin 200 milliGRAM(s) Oral three times a day  hydrALAZINE 10 milliGRAM(s) Oral every 8 hours  influenza   Vaccine 0.5 milliLiter(s) IntraMuscular once  insulin lispro (HumaLOG) corrective regimen sliding scale   SubCutaneous three times a day before meals  insulin lispro (HumaLOG) corrective regimen sliding scale   SubCutaneous at bedtime  levothyroxine 175 MICROGram(s) Oral daily  milrinone Infusion 0.25 MICROgram(s)/kG/Min IV Continuous <Continuous>  rifaximin 550 milliGRAM(s) Oral two times a day  spironolactone 25 milliGRAM(s) Oral daily                            10.4   7.31  )-----------( 111      ( 14 Nov 2018 07:43 )             33.2       Hemoglobin: 10.4 g/dL (11-14 @ 07:43)  Hemoglobin: 10.5 g/dL (11-13 @ 07:59)  Hemoglobin: 10.6 g/dL (11-12 @ 07:45)  Hemoglobin: 10.3 g/dL (11-10 @ 08:06)      11-14    131<L>  |  84<L>  |  15  ----------------------------<  230<H>  3.2<L>   |  35<H>  |  0.74    Ca    7.3<L>      14 Nov 2018 23:09  Phos  4.0     11-14  Mg     1.2     11-14    TPro  6.4  /  Alb  2.7<L>  /  TBili  4.8<H>  /  DBili  3.1<H>  /  AST  61<H>  /  ALT  27  /  AlkPhos  282<H>  11-14    Creatinine Trend: 0.74<--, 0.74<--, 0.73<--, 0.86<--, 0.78<--, 0.96<--    COAGS: PT/INR - ( 14 Nov 2018 08:58 )   PT: 18.5 sec;   INR: 1.60 ratio                   T(C): 36.8 (11-15-18 @ 04:20), Max: 37.3 (11-14-18 @ 11:28)  HR: 108 (11-15-18 @ 04:20) (103 - 111)  BP: 92/67 (11-15-18 @ 04:20) (92/67 - 111/74)  RR: 17 (11-15-18 @ 04:20) (17 - 18)  SpO2: 93% (11-15-18 @ 04:20) (93% - 99%)  Wt(kg): --    I&O's Summary    13 Nov 2018 07:01  -  14 Nov 2018 07:00  --------------------------------------------------------  IN: 1185.2 mL / OUT: 2175 mL / NET: -989.8 mL    14 Nov 2018 07:01  -  15 Nov 2018 04:50  --------------------------------------------------------  IN: 720 mL / OUT: 1300 mL / NET: -580 mL        lymphatic: No lymphadenopathy + pitting edema in LE bilaterally   Cardiovascular: Normal S1 S2,RRR, No murmurs , Peripheral pulses palpable 2+ bilaterally  Respiratory: Lungs clear to auscultation, normal effort 	  Gastrointestinal:  Soft, distended   Skin: No rashes, No ecchymoses, No cyanosis,       TELEMETRY:	    ECG: < from: 12 Lead ECG (11.06.18 @ 19:34) >  Atrial-sensed ventricular-paced rhythm  Biventricular pacemaker detected  ABNORMAL ECG    < end of copied text >    	  RADIOLOGY:   DIAGNOSTIC TESTING:  [ ] Echocardiogram: < from: Transthoracic Echocardiogram (08.23.18 @ 11:25) >  1. Tethered mitral valve leaflets with normal opening.  Moderate-severe mitral regurgitation.  2. Normal left ventricular internal dimensions and wall  thicknesses.  3. Severe global left ventricular systolic dysfunction.  4. Moderate right atrial enlargement.  A device wire is  noted in the right heart.  5. Right ventricular enlargement with decreased right  ventricular systolic function.  6. Normal tricuspid valve.  Severe tricuspid regurgitation.  7. Estimated pulmonary artery systolic pressure equals 24  mm Hg, assuming right atrial pressure equals 10  mm Hg,  consistent with normal pulmonary pressures.  *** Compared with echocardiogram of 6/29/2018, no  significant changes noted.    < end of copied text >    [ ]  Catheterization:  [ ] Stress Test:    OTHER: 	      ASSESSMENT/PLAN: 62 y/o F with PMHx of NICM s/p AICD, HTN, moderate-severe MR, DM, ? medication compliance who was admitted with volume overload and heart failure.    - CHF team recs noted - cont inotropic assisted diuresis and bumex PO   - GI / DVT prophylaxis,  keep K>4, mag >2.0   - cont synthroid   - HD stable   - follow up Dr Robbins for cardiology upon DC  -no further interventional workup needed at this time  D/W Dr Muller Patient with no anginal chest pain or shortness of breath    aspirin  chewable 81 milliGRAM(s) Oral daily  buMETAnide 3 milliGRAM(s) Oral every 12 hours  calcitriol   Capsule 0.5 MICROGram(s) Oral daily  calcium acetate 2001 milliGRAM(s) Oral three times a day with meals  calcium carbonate 1250 mG  + Vitamin D (OsCal 500 + D) 2 Tablet(s) Oral three times a day  cefpodoxime 200 milliGRAM(s) Oral every 12 hours  gabapentin 200 milliGRAM(s) Oral three times a day  hydrALAZINE 10 milliGRAM(s) Oral every 8 hours  influenza   Vaccine 0.5 milliLiter(s) IntraMuscular once  insulin lispro (HumaLOG) corrective regimen sliding scale   SubCutaneous three times a day before meals  insulin lispro (HumaLOG) corrective regimen sliding scale   SubCutaneous at bedtime  levothyroxine 175 MICROGram(s) Oral daily  milrinone Infusion 0.25 MICROgram(s)/kG/Min IV Continuous <Continuous>  rifaximin 550 milliGRAM(s) Oral two times a day  spironolactone 25 milliGRAM(s) Oral daily                            10.4   7.31  )-----------( 111      ( 14 Nov 2018 07:43 )             33.2       Hemoglobin: 10.4 g/dL (11-14 @ 07:43)  Hemoglobin: 10.5 g/dL (11-13 @ 07:59)  Hemoglobin: 10.6 g/dL (11-12 @ 07:45)  Hemoglobin: 10.3 g/dL (11-10 @ 08:06)      11-14    131<L>  |  84<L>  |  15  ----------------------------<  230<H>  3.2<L>   |  35<H>  |  0.74    Ca    7.3<L>      14 Nov 2018 23:09  Phos  4.0     11-14  Mg     1.2     11-14    TPro  6.4  /  Alb  2.7<L>  /  TBili  4.8<H>  /  DBili  3.1<H>  /  AST  61<H>  /  ALT  27  /  AlkPhos  282<H>  11-14    Creatinine Trend: 0.74<--, 0.74<--, 0.73<--, 0.86<--, 0.78<--, 0.96<--    COAGS: PT/INR - ( 14 Nov 2018 08:58 )   PT: 18.5 sec;   INR: 1.60 ratio                   T(C): 36.8 (11-15-18 @ 04:20), Max: 37.3 (11-14-18 @ 11:28)  HR: 108 (11-15-18 @ 04:20) (103 - 111)  BP: 92/67 (11-15-18 @ 04:20) (92/67 - 111/74)  RR: 17 (11-15-18 @ 04:20) (17 - 18)  SpO2: 93% (11-15-18 @ 04:20) (93% - 99%)  Wt(kg): --    I&O's Summary    13 Nov 2018 07:01  -  14 Nov 2018 07:00  --------------------------------------------------------  IN: 1185.2 mL / OUT: 2175 mL / NET: -989.8 mL    14 Nov 2018 07:01  -  15 Nov 2018 04:50  --------------------------------------------------------  IN: 720 mL / OUT: 1300 mL / NET: -580 mL        lymphatic: No lymphadenopathy + pitting edema in LE bilaterally   Cardiovascular: Normal S1 S2,RRR, No murmurs , Peripheral pulses palpable 2+ bilaterally  Respiratory: Lungs clear to auscultation, normal effort 	  Gastrointestinal:  Soft, distended   Skin: No rashes, No ecchymoses, No cyanosis,       TELEMETRY:	    ECG: < from: 12 Lead ECG (11.06.18 @ 19:34) >  Atrial-sensed ventricular-paced rhythm  Biventricular pacemaker detected  ABNORMAL ECG    < end of copied text >    	  RADIOLOGY:   DIAGNOSTIC TESTING:  [ ] Echocardiogram: < from: Transthoracic Echocardiogram (08.23.18 @ 11:25) >  1. Tethered mitral valve leaflets with normal opening.  Moderate-severe mitral regurgitation.  2. Normal left ventricular internal dimensions and wall  thicknesses.  3. Severe global left ventricular systolic dysfunction.  4. Moderate right atrial enlargement.  A device wire is  noted in the right heart.  5. Right ventricular enlargement with decreased right  ventricular systolic function.  6. Normal tricuspid valve.  Severe tricuspid regurgitation.  7. Estimated pulmonary artery systolic pressure equals 24  mm Hg, assuming right atrial pressure equals 10  mm Hg,  consistent with normal pulmonary pressures.  *** Compared with echocardiogram of 6/29/2018, no  significant changes noted.    < end of copied text >    [ ]  Catheterization:  [ ] Stress Test:    OTHER: 	      ASSESSMENT/PLAN: 64 y/o F with PMHx of NICM s/p AICD, HTN, moderate-severe MR, DM, ? medication compliance who was admitted with volume overload and heart failure.    - CHF team recs noted - cont inotropic assisted diuresis and bumex PO   - GI / DVT prophylaxis,  keep K>4, mag >2.0   - cont synthroid   - HD stable   - follow up Dr Robbins for cardiology upon DC  -no further EP workup needed at this time

## 2018-11-15 NOTE — PROGRESS NOTE ADULT - PROBLEM SELECTOR PLAN 1
continue Milrinone 0.25   - increase diuretic to Bumetanide 6 mg PO BID - if she does not respond adequately to increased dose of diuretics, will need to consider restarting IV diuretic therapy   - start Spironolactone 25 mg PO Daily   discussed the possible need for advanced therapies and encouraged outpatient follow up in [facility] Clinic (appointments have been scheduled)

## 2018-11-15 NOTE — PROGRESS NOTE ADULT - SUBJECTIVE AND OBJECTIVE BOX
S: denies chest pain or sob         aspirin  chewable 81 milliGRAM(s) Oral daily  buMETAnide 3 milliGRAM(s) Oral every 12 hours  calcitriol   Capsule 0.5 MICROGram(s) Oral daily  calcium acetate 2001 milliGRAM(s) Oral three times a day with meals  calcium carbonate 1250 mG  + Vitamin D (OsCal 500 + D) 2 Tablet(s) Oral three times a day  cefpodoxime 200 milliGRAM(s) Oral every 12 hours  gabapentin 200 milliGRAM(s) Oral three times a day  hydrALAZINE 10 milliGRAM(s) Oral every 8 hours  influenza   Vaccine 0.5 milliLiter(s) IntraMuscular once  insulin lispro (HumaLOG) corrective regimen sliding scale   SubCutaneous three times a day before meals  insulin lispro (HumaLOG) corrective regimen sliding scale   SubCutaneous at bedtime  levothyroxine 175 MICROGram(s) Oral daily  milrinone Infusion 0.25 MICROgram(s)/kG/Min IV Continuous <Continuous>  rifaximin 550 milliGRAM(s) Oral two times a day  spironolactone 25 milliGRAM(s) Oral daily                            10.0   8.49  )-----------( 108      ( 15 Nov 2018 07:53 )             31.7       11-15    133<L>  |  85<L>  |  15  ----------------------------<  147<H>  3.9   |  33<H>  |  0.78    Ca    7.3<L>      15 Nov 2018 06:34  Phos  3.8     11-15  Mg     1.7     11-15    TPro  6.4  /  Alb  2.7<L>  /  TBili  4.8<H>  /  DBili  3.1<H>  /  AST  61<H>  /  ALT  27  /  AlkPhos  282<H>  11-14            T(C): 36.8 (11-15-18 @ 04:20), Max: 37.3 (11-14-18 @ 11:28)  HR: 108 (11-15-18 @ 09:18) (103 - 111)  BP: 88/58 (11-15-18 @ 09:18) (88/58 - 111/74)  RR: 17 (11-15-18 @ 04:20) (17 - 18)  SpO2: 93% (11-15-18 @ 04:20) (93% - 99%)  Wt(kg): --    I&O's Summary    14 Nov 2018 07:01  -  15 Nov 2018 07:00  --------------------------------------------------------  IN: 720 mL / OUT: 1600 mL / NET: -880 mL        lymphatic: No lymphadenopathy + edema in LE bilaterally   Cardiovascular: Normal S1 S2,RRR, No murmurs ,   Respiratory: Lungs clear to auscultation, normal effort 	  Gastrointestinal:  Soft, distended   Skin: No rashes, No ecchymoses, No cyanosis,       TELEMETRY:	    ECG: < from: 12 Lead ECG (11.06.18 @ 19:34) >  Atrial-sensed ventricular-paced rhythm  Biventricular pacemaker detected  ABNORMAL ECG    < end of copied text >    	  RADIOLOGY:   DIAGNOSTIC TESTING:  [ ] Echocardiogram: < from: Transthoracic Echocardiogram (08.23.18 @ 11:25) >  1. Tethered mitral valve leaflets with normal opening.  Moderate-severe mitral regurgitation.  2. Normal left ventricular internal dimensions and wall  thicknesses.  3. Severe global left ventricular systolic dysfunction.  4. Moderate right atrial enlargement.  A device wire is  noted in the right heart.  5. Right ventricular enlargement with decreased right  ventricular systolic function.  6. Normal tricuspid valve.  Severe tricuspid regurgitation.  7. Estimated pulmonary artery systolic pressure equals 24  mm Hg, assuming right atrial pressure equals 10  mm Hg,  consistent with normal pulmonary pressures.  *** Compared with echocardiogram of 6/29/2018, no  significant changes noted.    < end of copied text >    [ ]  Catheterization:  [ ] Stress Test:    OTHER: 	      ASSESSMENT/PLAN: 64 y/o F with PMHx of NICM s/p AICD, HTN, moderate-severe MR, DM, ? medication compliance who was admitted with volume overload and heart failure.    -continue with inotrope assissted diuresis per heart failure  -no further interventional workup or ischemic evaluation needed at this time  -follow up heart failure  -follow up GYN  -dc planning per primary team    Kunal Muller MD

## 2018-11-15 NOTE — PROGRESS NOTE ADULT - SUBJECTIVE AND OBJECTIVE BOX
No pain, no shortness of breath      VITAL:  T(C): , Max: 37.2 (11-14-18 @ 22:34)  T(F): , Max: 98.9 (11-14-18 @ 22:34)  HR: 111 (11-15-18 @ 13:33)  BP: 105/75 (11-15-18 @ 13:33)  RR: 17 (11-15-18 @ 11:37)  SpO2: 98% (11-15-18 @ 13:33)      PHYSICAL EXAM:  Constitutional: NAD, Alert  HEENT: NCAT, MMM  Neck: Supple, (+) JVD  Respiratory: CTA b/l  Cardiovascular: reg, tachy s1s2  Gastrointestinal: BS+, soft, NT, (+)mild distension  Extremities: 2+ b/l LE edema  Neurological: no focal deficits; strength grossly intact  Back: no CVAT b/l  Skin: No rashes, no nevi        LABS:                        10.0   8.49  )-----------( 108      ( 15 Nov 2018 07:53 )             31.7     Na(133)/K(3.9)/Cl(85)/HCO3(33)/BUN(15)/Cr(0.78)Glu(147)/Ca(7.3)/Mg(1.7)/PO4(3.8)    11-15 @ 06:34  Na(131)/K(3.2)/Cl(84)/HCO3(35)/BUN(15)/Cr(0.74)Glu(230)/Ca(7.3)/Mg(1.2)/PO4(4.0)    11-14 @ 23:09  Na(132)/K(3.4)/Cl(85)/HCO3(34)/BUN(18)/Cr(0.74)Glu(161)/Ca(7.7)/Mg(--)/PO4(--)    11-14 @ 06:00  Na(133)/K(3.2)/Cl(85)/HCO3(36)/BUN(14)/Cr(0.73)Glu(101)/Ca(8.3)/Mg(--)/PO4(--)    11-13 @ 06:24      ASSESSMENT: 63F w/ HTN, DM2, thyroid CA s/p resection, acquired hypoparathyroidism, and severe HFrEF, 11/6/18 a/w vaginal bleeding/MAGNUS/electrolyte derangements/ acute on chronic HFrEF    (1)Renal - MAGNUS - Prerenally mediated from decompensated CHF. Now resolved.    (2)Hypokalemia -improved, s/p repletion    (3)Hypomagnesemia - improved s/p repletion    (4)Bone - hypocalcemia/hyperphosphatemia due to hypoparathyroidism (s/p iatrogenic parathyroidectomy). Very chronic. Much improved relative to admission.    (5)CV - much improved volume status relative to admission. Now off Bumex and Primacor gtts.      RECOMMEND:  (1)Could add back low-dose Entresto  (2)Spironolactone as ordered  (3)Torsemide, KCl, and Mg as taken at home            Brian Henry MD  [facility]  [phone]

## 2018-11-15 NOTE — PROGRESS NOTE ADULT - SUBJECTIVE AND OBJECTIVE BOX
Patient with no anginal chest pain or shortness of breath      aspirin  chewable 81 milliGRAM(s) Oral daily  calcitriol   Capsule 0.5 MICROGram(s) Oral daily  calcium acetate 2001 milliGRAM(s) Oral three times a day with meals  calcium carbonate 1250 mG  + Vitamin D (OsCal 500 + D) 2 Tablet(s) Oral three times a day  cefpodoxime 200 milliGRAM(s) Oral every 12 hours  gabapentin 200 milliGRAM(s) Oral three times a day  influenza   Vaccine 0.5 milliLiter(s) IntraMuscular once  insulin lispro (HumaLOG) corrective regimen sliding scale   SubCutaneous three times a day before meals  insulin lispro (HumaLOG) corrective regimen sliding scale   SubCutaneous at bedtime  levothyroxine 175 MICROGram(s) Oral daily  milrinone Infusion 0.25 MICROgram(s)/kG/Min IV Continuous <Continuous>  rifaximin 550 milliGRAM(s) Oral two times a day  spironolactone 25 milliGRAM(s) Oral daily                            10.0   8.49  )-----------( 108      ( 15 Nov 2018 07:53 )             31.7       Hemoglobin: 10.0 g/dL (11-15 @ 07:53)  Hemoglobin: 10.4 g/dL (11-14 @ 07:43)  Hemoglobin: 10.5 g/dL (11-13 @ 07:59)  Hemoglobin: 10.6 g/dL (11-12 @ 07:45)      11-15    133<L>  |  85<L>  |  15  ----------------------------<  147<H>  3.9   |  33<H>  |  0.78    Ca    7.3<L>      15 Nov 2018 06:34  Phos  3.8     11-15  Mg     1.7     11-15    TPro  6.4  /  Alb  2.7<L>  /  TBili  4.8<H>  /  DBili  3.1<H>  /  AST  61<H>  /  ALT  27  /  AlkPhos  282<H>  11-14    Creatinine Trend: 0.78<--, 0.74<--, 0.74<--, 0.73<--, 0.86<--, 0.78<--    COAGS:           T(C): 36.7 (11-15-18 @ 11:37), Max: 37.2 (11-14-18 @ 22:34)  HR: 111 (11-15-18 @ 13:33) (95 - 111)  BP: 105/75 (11-15-18 @ 13:33) (88/58 - 111/74)  RR: 17 (11-15-18 @ 11:37) (17 - 18)  SpO2: 98% (11-15-18 @ 13:33) (93% - 99%)  Wt(kg): --    I&O's Summary    14 Nov 2018 07:01  -  15 Nov 2018 07:00  --------------------------------------------------------  IN: 720 mL / OUT: 1600 mL / NET: -880 mL    15 Nov 2018 07:01  -  15 Nov 2018 16:40  --------------------------------------------------------  IN: 300 mL / OUT: 300 mL / NET: 0 mL      	  Lymphatic: No lymphadenopathy + pitting edema in LE bilaterally   Cardiovascular: Normal S1 S2,RRR, No murmurs , Peripheral pulses palpable 2+ bilaterally  Respiratory: Lungs clear to auscultation, normal effort 	  Gastrointestinal:  Soft, distended   Skin: No rashes, No ecchymoses, No cyanosis,       TELEMETRY:	    ECG: < from: 12 Lead ECG (11.06.18 @ 19:34) >  Atrial-sensed ventricular-paced rhythm  Biventricular pacemaker detected  ABNORMAL ECG    < end of copied text >    	  RADIOLOGY:   DIAGNOSTIC TESTING:  [ ] Echocardiogram: < from: Transthoracic Echocardiogram (08.23.18 @ 11:25) >  1. Tethered mitral valve leaflets with normal opening.  Moderate-severe mitral regurgitation.  2. Normal left ventricular internal dimensions and wall  thicknesses.  3. Severe global left ventricular systolic dysfunction.  4. Moderate right atrial enlargement.  A device wire is  noted in the right heart.  5. Right ventricular enlargement with decreased right  ventricular systolic function.  6. Normal tricuspid valve.  Severe tricuspid regurgitation.  7. Estimated pulmonary artery systolic pressure equals 24  mm Hg, assuming right atrial pressure equals 10  mm Hg,  consistent with normal pulmonary pressures.  *** Compared with echocardiogram of 6/29/2018, no  significant changes noted.    < end of copied text >    [ ]  Catheterization:  [ ] Stress Test:    OTHER: 	      ASSESSMENT/PLAN: 64 y/o F with PMHx of NICM s/p AICD, HTN, moderate-severe MR, DM, ? medication compliance who was admitted with volume overload and heart failure.    -  Remains fluid overloaded cont Milrinone and IV bumex, May need lasix gtt, will d/w CHF team  - No urgency to start Beta blockers   - follow up Dr Robbins for cardiology upon DC      Augusto Grimes MD, FACC Patient with no anginal chest pain or shortness of breath      aspirin  chewable 81 milliGRAM(s) Oral daily  calcitriol   Capsule 0.5 MICROGram(s) Oral daily  calcium acetate 2001 milliGRAM(s) Oral three times a day with meals  calcium carbonate 1250 mG  + Vitamin D (OsCal 500 + D) 2 Tablet(s) Oral three times a day  cefpodoxime 200 milliGRAM(s) Oral every 12 hours  gabapentin 200 milliGRAM(s) Oral three times a day  influenza   Vaccine 0.5 milliLiter(s) IntraMuscular once  insulin lispro (HumaLOG) corrective regimen sliding scale   SubCutaneous three times a day before meals  insulin lispro (HumaLOG) corrective regimen sliding scale   SubCutaneous at bedtime  levothyroxine 175 MICROGram(s) Oral daily  milrinone Infusion 0.25 MICROgram(s)/kG/Min IV Continuous <Continuous>  rifaximin 550 milliGRAM(s) Oral two times a day  spironolactone 25 milliGRAM(s) Oral daily                            10.0   8.49  )-----------( 108      ( 15 Nov 2018 07:53 )             31.7       Hemoglobin: 10.0 g/dL (11-15 @ 07:53)  Hemoglobin: 10.4 g/dL (11-14 @ 07:43)  Hemoglobin: 10.5 g/dL (11-13 @ 07:59)  Hemoglobin: 10.6 g/dL (11-12 @ 07:45)      11-15    133<L>  |  85<L>  |  15  ----------------------------<  147<H>  3.9   |  33<H>  |  0.78    Ca    7.3<L>      15 Nov 2018 06:34  Phos  3.8     11-15  Mg     1.7     11-15    TPro  6.4  /  Alb  2.7<L>  /  TBili  4.8<H>  /  DBili  3.1<H>  /  AST  61<H>  /  ALT  27  /  AlkPhos  282<H>  11-14    Creatinine Trend: 0.78<--, 0.74<--, 0.74<--, 0.73<--, 0.86<--, 0.78<--    COAGS:           T(C): 36.7 (11-15-18 @ 11:37), Max: 37.2 (11-14-18 @ 22:34)  HR: 111 (11-15-18 @ 13:33) (95 - 111)  BP: 105/75 (11-15-18 @ 13:33) (88/58 - 111/74)  RR: 17 (11-15-18 @ 11:37) (17 - 18)  SpO2: 98% (11-15-18 @ 13:33) (93% - 99%)  Wt(kg): --    I&O's Summary    14 Nov 2018 07:01  -  15 Nov 2018 07:00  --------------------------------------------------------  IN: 720 mL / OUT: 1600 mL / NET: -880 mL    15 Nov 2018 07:01  -  15 Nov 2018 16:40  --------------------------------------------------------  IN: 300 mL / OUT: 300 mL / NET: 0 mL      	  Lymphatic: No lymphadenopathy + pitting edema in LE bilaterally   Cardiovascular: Normal S1 S2,RRR, No murmurs , Peripheral pulses palpable 2+ bilaterally  Respiratory: Lungs clear to auscultation, normal effort 	  Gastrointestinal:  Soft, distended   Skin: No rashes, No ecchymoses, No cyanosis,       TELEMETRY:	    ECG: < from: 12 Lead ECG (11.06.18 @ 19:34) >  Atrial-sensed ventricular-paced rhythm  Biventricular pacemaker detected  ABNORMAL ECG    < end of copied text >    	  RADIOLOGY:   DIAGNOSTIC TESTING:  [ ] Echocardiogram: < from: Transthoracic Echocardiogram (08.23.18 @ 11:25) >  1. Tethered mitral valve leaflets with normal opening.  Moderate-severe mitral regurgitation.  2. Normal left ventricular internal dimensions and wall  thicknesses.  3. Severe global left ventricular systolic dysfunction.  4. Moderate right atrial enlargement.  A device wire is  noted in the right heart.  5. Right ventricular enlargement with decreased right  ventricular systolic function.  6. Normal tricuspid valve.  Severe tricuspid regurgitation.  7. Estimated pulmonary artery systolic pressure equals 24  mm Hg, assuming right atrial pressure equals 10  mm Hg,  consistent with normal pulmonary pressures.  *** Compared with echocardiogram of 6/29/2018, no  significant changes noted.    < end of copied text >    [ ]  Catheterization:  [ ] Stress Test:    OTHER: 	      ASSESSMENT/PLAN: 62 y/o F with PMHx of NICM s/p AICD, HTN, moderate-severe MR, DM, ? medication compliance who was admitted with volume overload and heart failure.    -  Remains fluid overloaded cont Milrinone  May need lasix gtt, will d/w CHF team  - No urgency to start Beta blockers   - follow up Dr Robbins for cardiology upon DC      Augusto Grimes MD, FACC

## 2018-11-15 NOTE — PROGRESS NOTE ADULT - ASSESSMENT
The Pt is a 61 y/o woman with h/o NICM (LVEF 20%, LVIDd 6.5 cm), initially identified in 2013, s/p CRT-D (6/2017), Mod-Severe MR, Severe TR, HTN, HLD, DM II who presented to the ED with c/o vaginal bleeding in the setting of an elevated INR, which was likely the result of Congestive Hepatopathy. She has multiple findings that suggest that she is in Cardiogenic Shock complicated by UTI, and has been started on empiric inotropic support. She improved significantly with inotropic support and diuresis, but in the past day had a 3 lb weight gain after transitioning to oral diuretic therapy and now has evidence of elevated filling pressures.

## 2018-11-15 NOTE — PROGRESS NOTE ADULT - SUBJECTIVE AND OBJECTIVE BOX
Subjective: No acute events overnight. Pt denies acute complaints and would like to go home today.     Medications:  aspirin  chewable 81 milliGRAM(s) Oral daily  buMETAnide 6 milliGRAM(s) Oral once  calcitriol   Capsule 0.5 MICROGram(s) Oral daily  calcium acetate 2001 milliGRAM(s) Oral three times a day with meals  calcium carbonate 1250 mG  + Vitamin D (OsCal 500 + D) 2 Tablet(s) Oral three times a day  cefpodoxime 200 milliGRAM(s) Oral every 12 hours  gabapentin 200 milliGRAM(s) Oral three times a day  influenza   Vaccine 0.5 milliLiter(s) IntraMuscular once  insulin lispro (HumaLOG) corrective regimen sliding scale   SubCutaneous three times a day before meals  insulin lispro (HumaLOG) corrective regimen sliding scale   SubCutaneous at bedtime  levothyroxine 175 MICROGram(s) Oral daily  milrinone Infusion 0.25 MICROgram(s)/kG/Min IV Continuous <Continuous>  rifaximin 550 milliGRAM(s) Oral two times a day  spironolactone 25 milliGRAM(s) Oral daily    Vitals:  T(C): 36.7 (11-15-18 @ 11:37), Max: 37.2 (18 @ 22:34)  HR: 95 (11-15-18 @ 11:37) (95 - 111)  BP: 105/75 (11-15-18 @ 11:37) (88/58 - 111/74)  RR: 17 (11-15-18 @ 11:37) (17 - 18)  SpO2: 96% (11-15-18 @ 11:37) (93% - 99%)    Daily Weight in k.8 (15 Nov 2018 11:10)        I&O's Summary    2018 07:01  -  15 Nov 2018 07:00  --------------------------------------------------------  IN: 720 mL / OUT: 1600 mL / NET: -880 mL        Physical Exam:  Appearance: No Acute Distress  HEENT: JVP moderately elevated   Cardiovascular: RRR, Normal S1 S2  Respiratory: Clear to auscultation bilaterally  Gastrointestinal: Soft, Non-tender, non-distended	  Skin: no skin lesions  Neurologic: Non-focal, +asterixis   Extremities: No LE edema, warm and well perfused  Psychiatry: A & O x 3, Mood & affect appropriate      Labs:                        10.0   8.49  )-----------( 108      ( 15 Nov 2018 07:53 )             31.7     11-15    133<L>  |  85<L>  |  15  ----------------------------<  147<H>  3.9   |  33<H>  |  0.78    Ca    7.3<L>      15 Nov 2018 06:34  Phos  3.8     11-15  Mg     1.7     -15    TPro  6.4  /  Alb  2.7<L>  /  TBili  4.8<H>  /  DBili  3.1<H>  /  AST  61<H>  /  ALT  27  /  AlkPhos  282<H>  11-14      PT/INR - ( 2018 08:58 )   PT: 18.5 sec;   INR: 1.60 ratio

## 2018-11-15 NOTE — PROGRESS NOTE ADULT - SUBJECTIVE AND OBJECTIVE BOX
Interval Events: Interval events: pt seen and examined. She denies abdominal pain, n/v. Tolerating PO well.     MEDICATIONS  (STANDING):  aspirin  chewable 81 milliGRAM(s) Oral daily  buMETAnide 3 milliGRAM(s) Oral every 12 hours  calcitriol   Capsule 0.5 MICROGram(s) Oral daily  calcium acetate 2001 milliGRAM(s) Oral three times a day with meals  calcium carbonate 1250 mG  + Vitamin D (OsCal 500 + D) 2 Tablet(s) Oral three times a day  cefpodoxime 200 milliGRAM(s) Oral every 12 hours  gabapentin 200 milliGRAM(s) Oral three times a day  hydrALAZINE 10 milliGRAM(s) Oral every 8 hours  influenza   Vaccine 0.5 milliLiter(s) IntraMuscular once  insulin lispro (HumaLOG) corrective regimen sliding scale   SubCutaneous three times a day before meals  insulin lispro (HumaLOG) corrective regimen sliding scale   SubCutaneous at bedtime  levothyroxine 175 MICROGram(s) Oral daily  milrinone Infusion 0.25 MICROgram(s)/kG/Min (7.26 mL/Hr) IV Continuous <Continuous>  rifaximin 550 milliGRAM(s) Oral two times a day  spironolactone 25 milliGRAM(s) Oral daily    MEDICATIONS  (PRN):      Allergies    Isordil (Headache)  penicillin (Rash)    Intolerances        Review of Systems:    General:  No wt loss, fevers, chills, night sweats,fatigue,   Eyes:  Good vision, no reported pain  ENT:  No sore throat, pain, runny nose, dysphagia  CV:  No pain, palpitations, hypo/hypertension  Resp:  No dyspnea, cough, tachypnea, wheezing  GI:  No pain, No nausea, No vomiting, No diarrhea, No constipation, No weight loss, No fever, No pruritis, No rectal bleeding, No melena, No dysphagia  :  No pain, bleeding, incontinence, nocturia  Muscle:  No pain, weakness  Neuro:  No weakness, tingling, memory problems  Psych:  No fatigue, insomnia, mood problems, depression  Endocrine:  No polyuria, polydypsia, cold/heat intolerance  Heme:  No petechiae, ecchymosis, easy bruisability  Skin:  No rash, tattoos, scars, edema      Vital Signs Last 24 Hrs  T(C): 36.8 (15 Nov 2018 04:20), Max: 37.3 (14 Nov 2018 11:28)  T(F): 98.3 (15 Nov 2018 04:20), Max: 99.1 (14 Nov 2018 11:28)  HR: 108 (15 Nov 2018 09:18) (103 - 111)  BP: 88/58 (15 Nov 2018 09:18) (88/58 - 111/74)  BP(mean): --  RR: 17 (15 Nov 2018 04:20) (17 - 18)  SpO2: 93% (15 Nov 2018 04:20) (93% - 99%)    PHYSICAL EXAM:    Constitutional: NAD, well-developed  HEENT: EOMI, throat clear  Neck: No LAD, supple  Respiratory: CTA and P  Cardiovascular: S1 and S2, RRR, no M  Gastrointestinal: BS+, soft, NT/ND, neg HSM,  Extremities: No peripheral edema, neg clubing, cyanosis  Vascular: 2+ peripheral pulses  Neurological: A/O x 3, no focal deficits  Psychiatric: Normal mood, normal affect  Skin: No rashes      LABS:                        10.0   8.49  )-----------( x        ( 15 Nov 2018 07:53 )             31.7     11-15    133<L>  |  85<L>  |  15  ----------------------------<  147<H>  3.9   |  33<H>  |  0.78    Ca    7.3<L>      15 Nov 2018 06:34  Phos  3.8     11-15  Mg     1.7     11-15    TPro  6.4  /  Alb  2.7<L>  /  TBili  4.8<H>  /  DBili  3.1<H>  /  AST  61<H>  /  ALT  27  /  AlkPhos  282<H>  11-14    PT/INR - ( 14 Nov 2018 08:58 )   PT: 18.5 sec;   INR: 1.60 ratio               RADIOLOGY & ADDITIONAL TESTS:

## 2018-11-16 DIAGNOSIS — E87.8 OTHER DISORDERS OF ELECTROLYTE AND FLUID BALANCE, NOT ELSEWHERE CLASSIFIED: ICD-10-CM

## 2018-11-16 LAB
ANION GAP SERPL CALC-SCNC: 16 MMOL/L — SIGNIFICANT CHANGE UP (ref 5–17)
ANION GAP SERPL CALC-SCNC: 18 MMOL/L — HIGH (ref 5–17)
BUN SERPL-MCNC: 13 MG/DL — SIGNIFICANT CHANGE UP (ref 7–23)
BUN SERPL-MCNC: 14 MG/DL — SIGNIFICANT CHANGE UP (ref 7–23)
CALCIUM SERPL-MCNC: 6.8 MG/DL — LOW (ref 8.4–10.5)
CALCIUM SERPL-MCNC: 7.3 MG/DL — LOW (ref 8.4–10.5)
CHLORIDE SERPL-SCNC: 84 MMOL/L — LOW (ref 96–108)
CHLORIDE SERPL-SCNC: 85 MMOL/L — LOW (ref 96–108)
CO2 SERPL-SCNC: 31 MMOL/L — SIGNIFICANT CHANGE UP (ref 22–31)
CO2 SERPL-SCNC: 33 MMOL/L — HIGH (ref 22–31)
CREAT SERPL-MCNC: 0.74 MG/DL — SIGNIFICANT CHANGE UP (ref 0.5–1.3)
CREAT SERPL-MCNC: 0.8 MG/DL — SIGNIFICANT CHANGE UP (ref 0.5–1.3)
GLUCOSE BLDC GLUCOMTR-MCNC: 138 MG/DL — HIGH (ref 70–99)
GLUCOSE BLDC GLUCOMTR-MCNC: 174 MG/DL — HIGH (ref 70–99)
GLUCOSE BLDC GLUCOMTR-MCNC: 177 MG/DL — HIGH (ref 70–99)
GLUCOSE BLDC GLUCOMTR-MCNC: 218 MG/DL — HIGH (ref 70–99)
GLUCOSE SERPL-MCNC: 115 MG/DL — HIGH (ref 70–99)
GLUCOSE SERPL-MCNC: 178 MG/DL — HIGH (ref 70–99)
HCT VFR BLD CALC: 32.3 % — LOW (ref 34.5–45)
HGB BLD-MCNC: 10 G/DL — LOW (ref 11.5–15.5)
MAGNESIUM SERPL-MCNC: 1.2 MG/DL — LOW (ref 1.6–2.6)
MAGNESIUM SERPL-MCNC: 1.5 MG/DL — LOW (ref 1.6–2.6)
MCHC RBC-ENTMCNC: 22.5 PG — LOW (ref 27–34)
MCHC RBC-ENTMCNC: 31 GM/DL — LOW (ref 32–36)
MCV RBC AUTO: 72.6 FL — LOW (ref 80–100)
PHOSPHATE SERPL-MCNC: 4.1 MG/DL — SIGNIFICANT CHANGE UP (ref 2.5–4.5)
PLATELET # BLD AUTO: 125 K/UL — LOW (ref 150–400)
POTASSIUM SERPL-MCNC: 2.6 MMOL/L — CRITICAL LOW (ref 3.5–5.3)
POTASSIUM SERPL-MCNC: 3.1 MMOL/L — LOW (ref 3.5–5.3)
POTASSIUM SERPL-SCNC: 2.6 MMOL/L — CRITICAL LOW (ref 3.5–5.3)
POTASSIUM SERPL-SCNC: 3.1 MMOL/L — LOW (ref 3.5–5.3)
RBC # BLD: 4.45 M/UL — SIGNIFICANT CHANGE UP (ref 3.8–5.2)
RBC # FLD: 26.1 % — HIGH (ref 10.3–14.5)
SODIUM SERPL-SCNC: 133 MMOL/L — LOW (ref 135–145)
SODIUM SERPL-SCNC: 134 MMOL/L — LOW (ref 135–145)
WBC # BLD: 7.65 K/UL — SIGNIFICANT CHANGE UP (ref 3.8–10.5)
WBC # FLD AUTO: 7.65 K/UL — SIGNIFICANT CHANGE UP (ref 3.8–10.5)

## 2018-11-16 PROCEDURE — 99233 SBSQ HOSP IP/OBS HIGH 50: CPT | Mod: GC

## 2018-11-16 RX ORDER — MILRINONE LACTATE 1 MG/ML
0.38 INJECTION, SOLUTION INTRAVENOUS
Qty: 20 | Refills: 0 | Status: DISCONTINUED | OUTPATIENT
Start: 2018-11-16 | End: 2018-11-23

## 2018-11-16 RX ORDER — POTASSIUM CHLORIDE 20 MEQ
10 PACKET (EA) ORAL
Qty: 0 | Refills: 0 | Status: COMPLETED | OUTPATIENT
Start: 2018-11-16 | End: 2018-11-16

## 2018-11-16 RX ORDER — SPIRONOLACTONE 25 MG/1
50 TABLET, FILM COATED ORAL
Qty: 0 | Refills: 0 | Status: DISCONTINUED | OUTPATIENT
Start: 2018-11-16 | End: 2018-11-23

## 2018-11-16 RX ORDER — POTASSIUM CHLORIDE 20 MEQ
10 PACKET (EA) ORAL
Qty: 0 | Refills: 0 | Status: COMPLETED | OUTPATIENT
Start: 2018-11-16 | End: 2018-11-17

## 2018-11-16 RX ORDER — CALCIUM GLUCONATE 100 MG/ML
1 VIAL (ML) INTRAVENOUS ONCE
Qty: 0 | Refills: 0 | Status: COMPLETED | OUTPATIENT
Start: 2018-11-16 | End: 2018-11-16

## 2018-11-16 RX ORDER — SALIVA SUBSTITUTE COMB NO.11 351 MG
5 POWDER IN PACKET (EA) MUCOUS MEMBRANE
Qty: 0 | Refills: 0 | Status: DISCONTINUED | OUTPATIENT
Start: 2018-11-16 | End: 2018-11-23

## 2018-11-16 RX ORDER — POTASSIUM CHLORIDE 20 MEQ
40 PACKET (EA) ORAL ONCE
Qty: 0 | Refills: 0 | Status: COMPLETED | OUTPATIENT
Start: 2018-11-16 | End: 2018-11-16

## 2018-11-16 RX ORDER — POTASSIUM CHLORIDE 20 MEQ
40 PACKET (EA) ORAL EVERY 4 HOURS
Qty: 0 | Refills: 0 | Status: COMPLETED | OUTPATIENT
Start: 2018-11-16 | End: 2018-11-16

## 2018-11-16 RX ORDER — MAGNESIUM SULFATE 500 MG/ML
2 VIAL (ML) INJECTION
Qty: 0 | Refills: 0 | Status: DISCONTINUED | OUTPATIENT
Start: 2018-11-16 | End: 2018-11-16

## 2018-11-16 RX ORDER — MAGNESIUM SULFATE 500 MG/ML
2 VIAL (ML) INJECTION ONCE
Qty: 0 | Refills: 0 | Status: COMPLETED | OUTPATIENT
Start: 2018-11-16 | End: 2018-11-16

## 2018-11-16 RX ADMIN — Medication 200 MILLIGRAM(S): at 17:03

## 2018-11-16 RX ADMIN — SPIRONOLACTONE 50 MILLIGRAM(S): 25 TABLET, FILM COATED ORAL at 17:12

## 2018-11-16 RX ADMIN — Medication 2 TABLET(S): at 21:07

## 2018-11-16 RX ADMIN — Medication 100 MILLIEQUIVALENT(S): at 09:56

## 2018-11-16 RX ADMIN — GABAPENTIN 200 MILLIGRAM(S): 400 CAPSULE ORAL at 15:11

## 2018-11-16 RX ADMIN — CALCITRIOL 0.5 MICROGRAM(S): 0.5 CAPSULE ORAL at 12:19

## 2018-11-16 RX ADMIN — Medication 40 MILLIEQUIVALENT(S): at 15:12

## 2018-11-16 RX ADMIN — Medication 100 MILLIEQUIVALENT(S): at 12:37

## 2018-11-16 RX ADMIN — Medication 1: at 17:04

## 2018-11-16 RX ADMIN — SPIRONOLACTONE 25 MILLIGRAM(S): 25 TABLET, FILM COATED ORAL at 05:05

## 2018-11-16 RX ADMIN — Medication 2001 MILLIGRAM(S): at 17:03

## 2018-11-16 RX ADMIN — MILRINONE LACTATE 10.89 MICROGRAM(S)/KG/MIN: 1 INJECTION, SOLUTION INTRAVENOUS at 17:07

## 2018-11-16 RX ADMIN — Medication 100 MILLIEQUIVALENT(S): at 11:12

## 2018-11-16 RX ADMIN — Medication 81 MILLIGRAM(S): at 12:20

## 2018-11-16 RX ADMIN — BUMETANIDE 5 MG/HR: 0.25 INJECTION INTRAMUSCULAR; INTRAVENOUS at 11:12

## 2018-11-16 RX ADMIN — Medication 200 GRAM(S): at 12:25

## 2018-11-16 RX ADMIN — Medication 40 MILLIEQUIVALENT(S): at 06:59

## 2018-11-16 RX ADMIN — Medication 200 MILLIGRAM(S): at 05:06

## 2018-11-16 RX ADMIN — Medication 50 GRAM(S): at 10:10

## 2018-11-16 RX ADMIN — Medication 2001 MILLIGRAM(S): at 09:58

## 2018-11-16 RX ADMIN — GABAPENTIN 200 MILLIGRAM(S): 400 CAPSULE ORAL at 21:07

## 2018-11-16 RX ADMIN — Medication 175 MICROGRAM(S): at 05:05

## 2018-11-16 RX ADMIN — Medication 5 MILLILITER(S): at 20:49

## 2018-11-16 RX ADMIN — GABAPENTIN 200 MILLIGRAM(S): 400 CAPSULE ORAL at 05:04

## 2018-11-16 RX ADMIN — Medication 2001 MILLIGRAM(S): at 12:19

## 2018-11-16 RX ADMIN — Medication 2 TABLET(S): at 15:11

## 2018-11-16 RX ADMIN — Medication 2: at 12:20

## 2018-11-16 RX ADMIN — MILRINONE LACTATE 7.26 MICROGRAM(S)/KG/MIN: 1 INJECTION, SOLUTION INTRAVENOUS at 11:12

## 2018-11-16 RX ADMIN — Medication 2 TABLET(S): at 05:04

## 2018-11-16 NOTE — PROGRESS NOTE ADULT - SUBJECTIVE AND OBJECTIVE BOX
S: Patient was seen and examined this morning. Was sleeping comfortably in chair initially Denied any CP or SOB. Doing well.     ROS otherwise negative        MEDICATIONS  (STANDING):  aspirin  chewable 81 milliGRAM(s) Oral daily  buMETAnide Infusion 1 mG/Hr (5 mL/Hr) IV Continuous <Continuous>  calcitriol   Capsule 0.5 MICROGram(s) Oral daily  calcium acetate 2001 milliGRAM(s) Oral three times a day with meals  calcium carbonate 1250 mG  + Vitamin D (OsCal 500 + D) 2 Tablet(s) Oral three times a day  calcium gluconate IVPB 1 Gram(s) IV Intermittent once  cefpodoxime 200 milliGRAM(s) Oral every 12 hours  gabapentin 200 milliGRAM(s) Oral three times a day  influenza   Vaccine 0.5 milliLiter(s) IntraMuscular once  insulin lispro (HumaLOG) corrective regimen sliding scale   SubCutaneous three times a day before meals  insulin lispro (HumaLOG) corrective regimen sliding scale   SubCutaneous at bedtime  levothyroxine 175 MICROGram(s) Oral daily  magnesium sulfate  IVPB 2 Gram(s) IV Intermittent once  milrinone Infusion 0.25 MICROgram(s)/kG/Min (7.26 mL/Hr) IV Continuous <Continuous>  potassium chloride    Tablet ER 40 milliEquivalent(s) Oral every 4 hours  potassium chloride  10 mEq/100 mL IVPB 10 milliEquivalent(s) IV Intermittent every 1 hour  rifaximin 550 milliGRAM(s) Oral two times a day  spironolactone 50 milliGRAM(s) Oral two times a day    MEDICATIONS  (PRN):      LABS:                        10.0   7.65  )-----------( 125      ( 16 Nov 2018 07:55 )             32.3     Hemoglobin: 10.0 g/dL (11-16 @ 07:55)  Hemoglobin: 10.0 g/dL (11-15 @ 07:53)  Hemoglobin: 10.4 g/dL (11-14 @ 07:43)  Hemoglobin: 10.5 g/dL (11-13 @ 07:59)  Hemoglobin: 10.6 g/dL (11-12 @ 07:45)    11-16    133<L>  |  84<L>  |  13  ----------------------------<  115<H>  2.6<LL>   |  33<H>  |  0.74    Ca    6.8<L>      16 Nov 2018 06:11  Phos  4.1     11-16  Mg     1.2     11-16      Creatinine Trend: 0.74<--, 0.78<--, 0.74<--, 0.74<--, 0.73<--, 0.86<--           PHYSICAL EXAM  Vital Signs Last 24 Hrs  T(C): 36.8 (16 Nov 2018 04:19), Max: 37.7 (15 Nov 2018 21:12)  T(F): 98.2 (16 Nov 2018 04:19), Max: 99.9 (15 Nov 2018 21:12)  HR: 95 (16 Nov 2018 04:19) (95 - 111)  BP: 104/73 (16 Nov 2018 04:19) (104/73 - 108/76)  BP(mean): --  RR: 18 (16 Nov 2018 04:19) (17 - 18)  SpO2: 96% (16 Nov 2018 04:19) (96% - 100%)    lymphatic: No lymphadenopathy + edema in LE bilaterally   Cardiovascular: Normal S1 S2,RRR, No murmurs ,   Respiratory: Lungs clear to auscultation, normal effort 	  Gastrointestinal:  Soft, distended   Skin: No rashes, No ecchymoses, No cyanosis,       TELEMETRY:	      ECG: < from: 12 Lead ECG (11.06.18 @ 19:34) >  Atrial-sensed ventricular-paced rhythm  Biventricular pacemaker detected  ABNORMAL ECG    < end of copied text >    	  RADIOLOGY:   DIAGNOSTIC TESTING:  [ ] Echocardiogram: < from: Transthoracic Echocardiogram (08.23.18 @ 11:25) >  1. Tethered mitral valve leaflets with normal opening.  Moderate-severe mitral regurgitation.  2. Normal left ventricular internal dimensions and wall  thicknesses.  3. Severe global left ventricular systolic dysfunction.  4. Moderate right atrial enlargement.  A device wire is  noted in the right heart.  5. Right ventricular enlargement with decreased right  ventricular systolic function.  6. Normal tricuspid valve.  Severe tricuspid regurgitation.  7. Estimated pulmonary artery systolic pressure equals 24  mm Hg, assuming right atrial pressure equals 10  mm Hg,  consistent with normal pulmonary pressures.  *** Compared with echocardiogram of 6/29/2018, no  significant changes noted.    < end of copied text >    [ ]  Catheterization:  [ ] Stress Test:    OTHER: 	      ASSESSMENT/PLAN: 64 y/o F with PMHx of NICM s/p AICD, HTN, moderate-severe MR, DM, ? medication compliance who was admitted with volume overload and heart failure.    -continue with inotrope assisted diuresis per heart failure. Last night 2L out. Maintain net neg.  -no further interventional workup or ischemic evaluation needed at this time  -follow up heart failure  -follow up GYN  -dc planning per primary team    Adele Miller PA-C

## 2018-11-16 NOTE — PROGRESS NOTE ADULT - ATTENDING COMMENTS
Agree with above assessment and plan as outlined above.    - Keep net negative    Augusto Grimes MD, FACC

## 2018-11-16 NOTE — PROGRESS NOTE ADULT - SUBJECTIVE AND OBJECTIVE BOX
Subjective: No acute events overnight. Pt had brief episode of NSVT in setting of Hypokalemia. She has no acute complaints and would like to be discharged.     Medications:  aspirin  chewable 81 milliGRAM(s) Oral daily  Biotene Dry Mouth Oral Rinse 5 milliLiter(s) Swish and Spit two times a day  buMETAnide Infusion 1 mG/Hr IV Continuous <Continuous>  calcitriol   Capsule 0.5 MICROGram(s) Oral daily  calcium acetate 2001 milliGRAM(s) Oral three times a day with meals  calcium carbonate 1250 mG  + Vitamin D (OsCal 500 + D) 2 Tablet(s) Oral three times a day  cefpodoxime 200 milliGRAM(s) Oral every 12 hours  gabapentin 200 milliGRAM(s) Oral three times a day  influenza   Vaccine 0.5 milliLiter(s) IntraMuscular once  insulin lispro (HumaLOG) corrective regimen sliding scale   SubCutaneous three times a day before meals  insulin lispro (HumaLOG) corrective regimen sliding scale   SubCutaneous at bedtime  levothyroxine 175 MICROGram(s) Oral daily  milrinone Infusion 0.25 MICROgram(s)/kG/Min IV Continuous <Continuous>  rifaximin 550 milliGRAM(s) Oral two times a day  spironolactone 50 milliGRAM(s) Oral two times a day    Vitals:  T(C): 36.9 (18 @ 11:28), Max: 37.7 (11-15-18 @ 21:12)  HR: 104 (18 @ 11:28) (95 - 110)  BP: 94/65 (18 @ 11:28) (94/65 - 108/76)  RR: 17 (18 @ 11:28) (17 - 18)  SpO2: 100% (18 @ 11:28) (96% - 100%)    Daily Weight in k (2018 07:28)        I&O's Summary    15 Nov 2018 07:01  -  2018 07:00  --------------------------------------------------------  IN: 770 mL / OUT: 2500 mL / NET: -1730 mL    2018 07:01  -  2018 16:06  --------------------------------------------------------  IN: 300 mL / OUT: 800 mL / NET: -500 mL        Physical Exam:  Appearance: No Acute Distress  HEENT: JVP moderately elevated  Cardiovascular: RRR, Normal S1 S2  Respiratory: Clear to auscultation bilaterally  Gastrointestinal: Soft, Non-tender, non-distended	  Skin: no skin lesions  Neurologic: Non-focal  Extremities: No LE edema, warm and well perfused  Psychiatry: A & O x 3, Mood & affect appropriate      Labs:                        10.0   7.65  )-----------( 125      ( 2018 07:55 )             32.3     11-16    133<L>  |  84<L>  |  13  ----------------------------<  115<H>  2.6<LL>   |  33<H>  |  0.74    Ca    6.8<L>      2018 06:11  Phos  4.1     -  Mg     1.2     -

## 2018-11-16 NOTE — PROGRESS NOTE ADULT - SUBJECTIVE AND OBJECTIVE BOX
EP ATTENDING    tele: NSR, biv pacing, brief NSVT    no palpitations, no syncope, no angina, dyspnea and LE edema improving slowly    aspirin  chewable 81 milliGRAM(s) Oral daily  buMETAnide Infusion 1 mG/Hr IV Continuous <Continuous>  calcitriol   Capsule 0.5 MICROGram(s) Oral daily  calcium acetate 2001 milliGRAM(s) Oral three times a day with meals  calcium carbonate 1250 mG  + Vitamin D (OsCal 500 + D) 2 Tablet(s) Oral three times a day  calcium gluconate IVPB 1 Gram(s) IV Intermittent once  cefpodoxime 200 milliGRAM(s) Oral every 12 hours  gabapentin 200 milliGRAM(s) Oral three times a day  influenza   Vaccine 0.5 milliLiter(s) IntraMuscular once  insulin lispro (HumaLOG) corrective regimen sliding scale   SubCutaneous three times a day before meals  insulin lispro (HumaLOG) corrective regimen sliding scale   SubCutaneous at bedtime  levothyroxine 175 MICROGram(s) Oral daily  milrinone Infusion 0.25 MICROgram(s)/kG/Min IV Continuous <Continuous>  potassium chloride    Tablet ER 40 milliEquivalent(s) Oral every 4 hours  potassium chloride  10 mEq/100 mL IVPB 10 milliEquivalent(s) IV Intermittent every 1 hour  rifaximin 550 milliGRAM(s) Oral two times a day  spironolactone 50 milliGRAM(s) Oral two times a day                            10.0   7.65  )-----------( 125      ( 16 Nov 2018 07:55 )             32.3       11-16    133<L>  |  84<L>  |  13  ----------------------------<  115<H>  2.6<LL>   |  33<H>  |  0.74    Ca    6.8<L>      16 Nov 2018 06:11  Phos  4.1     11-16  Mg     1.2     11-16        T(C): 36.9 (11-16-18 @ 11:28), Max: 37.7 (11-15-18 @ 21:12)  HR: 104 (11-16-18 @ 11:28) (95 - 111)  BP: 94/65 (11-16-18 @ 11:28) (94/65 - 108/76)  RR: 17 (11-16-18 @ 11:28) (17 - 18)  SpO2: 100% (11-16-18 @ 11:28) (96% - 100%)  Wt(kg): --      JVP 12  tachy, no murmurs  lungs with rales  soft nt/nd  + 2 edema    device interrogation normal  EKG: NSR, Biv pacing    A/P) She is a pleasant 62 y/o female PMH severe NICM (LVEF 10-15%) who had a [employer] Biv-ICD implanted at [facility]. A recent LHC at [facility] was also unremarkable. She now returns for with a severe decompensation of her NICM. Her device interrogation demonstrates excellent RA, RV and LV lead function. She denies palpitations nor high voltage therapy.     -continue lasix and milrinone as patient is still grossly volume overloaded, f/u cardiology  -f/u CHF consult for advanced therapies (VAD vs transplant)  -no need for AAD  -Her biv-icd was recently optimized by me. Future options including turning off BiV pacing to see if she responds better. This can be addressed when she's euvolemic  -will follow  -f/u with [facility] after discharge

## 2018-11-16 NOTE — CONSULT NOTE ADULT - ASSESSMENT
The pt. is unable to appreciate the risks of leaving the hospital and returning home. Her CHF may be contributing to her cognitive impairment in addition to personality issues (chronically noncompliant with tx per PA who spoke with her ). Based on a reasonable degree of psychiatric certaintly, the pt. lacks the capacity to make disposition decisions.     Recommend  Biotene mouthwash and oral swabs to reduce her fluid intake.  If QTc is under 500ms and pt. is agitated and not responding to verbal interventions, rx Haldol 1mg IM q8h prn.       Will follow with you here.   Thank you.    Momo Ortiz M.D.  Psychiatry  [phone]

## 2018-11-16 NOTE — PROGRESS NOTE ADULT - SUBJECTIVE AND OBJECTIVE BOX
S: denies chest pain or sob; still with LE pain       aspirin  chewable 81 milliGRAM(s) Oral daily  buMETAnide Infusion 1 mG/Hr IV Continuous <Continuous>  calcitriol   Capsule 0.5 MICROGram(s) Oral daily  calcium acetate 2001 milliGRAM(s) Oral three times a day with meals  calcium carbonate 1250 mG  + Vitamin D (OsCal 500 + D) 2 Tablet(s) Oral three times a day  calcium gluconate IVPB 1 Gram(s) IV Intermittent once  cefpodoxime 200 milliGRAM(s) Oral every 12 hours  gabapentin 200 milliGRAM(s) Oral three times a day  influenza   Vaccine 0.5 milliLiter(s) IntraMuscular once  insulin lispro (HumaLOG) corrective regimen sliding scale   SubCutaneous three times a day before meals  insulin lispro (HumaLOG) corrective regimen sliding scale   SubCutaneous at bedtime  levothyroxine 175 MICROGram(s) Oral daily  milrinone Infusion 0.25 MICROgram(s)/kG/Min IV Continuous <Continuous>  potassium chloride    Tablet ER 40 milliEquivalent(s) Oral every 4 hours  potassium chloride  10 mEq/100 mL IVPB 10 milliEquivalent(s) IV Intermittent every 1 hour  rifaximin 550 milliGRAM(s) Oral two times a day  spironolactone 50 milliGRAM(s) Oral two times a day                            10.0   7.65  )-----------( 125      ( 16 Nov 2018 07:55 )             32.3       11-16    133<L>  |  84<L>  |  13  ----------------------------<  115<H>  2.6<LL>   |  33<H>  |  0.74    Ca    6.8<L>      16 Nov 2018 06:11  Phos  4.1     11-16  Mg     1.2     11-16              T(C): 36.9 (11-16-18 @ 11:28), Max: 37.7 (11-15-18 @ 21:12)  HR: 104 (11-16-18 @ 11:28) (95 - 111)  BP: 94/65 (11-16-18 @ 11:28) (94/65 - 108/76)  RR: 17 (11-16-18 @ 11:28) (17 - 18)  SpO2: 100% (11-16-18 @ 11:28) (96% - 100%)  Wt(kg): --    I&O's Summary    15 Nov 2018 07:01  -  16 Nov 2018 07:00  --------------------------------------------------------  IN: 770 mL / OUT: 2500 mL / NET: -1730 mL    16 Nov 2018 07:01  -  16 Nov 2018 11:50  --------------------------------------------------------  IN: 0 mL / OUT: 800 mL / NET: -800 mL          lymphatic: No lymphadenopathy + edema in LE bilaterally   Cardiovascular: Normal S1 S2,RRR, No murmurs ,   Respiratory: Lungs clear to auscultation, normal effort 	  Gastrointestinal:  Soft, distended   Skin: No rashes, No ecchymoses, No cyanosis,       TELEMETRY: ST, , 15 beats NSVT	    ECG: < from: 12 Lead ECG (11.06.18 @ 19:34) >  Atrial-sensed ventricular-paced rhythm  Biventricular pacemaker detected  ABNORMAL ECG    < end of copied text >    	  RADIOLOGY:   DIAGNOSTIC TESTING:  [ ] Echocardiogram: < from: Transthoracic Echocardiogram (08.23.18 @ 11:25) >  1. Tethered mitral valve leaflets with normal opening.  Moderate-severe mitral regurgitation.  2. Normal left ventricular internal dimensions and wall  thicknesses.  3. Severe global left ventricular systolic dysfunction.  4. Moderate right atrial enlargement.  A device wire is  noted in the right heart.  5. Right ventricular enlargement with decreased right  ventricular systolic function.  6. Normal tricuspid valve.  Severe tricuspid regurgitation.  7. Estimated pulmonary artery systolic pressure equals 24  mm Hg, assuming right atrial pressure equals 10  mm Hg,  consistent with normal pulmonary pressures.  *** Compared with echocardiogram of 6/29/2018, no  significant changes noted.    < end of copied text >    [ ]  Catheterization:  [ ] Stress Test:    OTHER: 	      ASSESSMENT/PLAN: 64 y/o F with PMHx of NICM s/p AICD, HTN, moderate-severe MR, DM, ? medication compliance who was admitted with volume overload and heart failure.    -continue with inotrope assisted diuresis per heart failure  -placed on Bumex gtt yesterday - now negative 1.7L  -continue with IV diuresis  -follow up EP re: NSVT  -heart failure follow up   -no further interventional workup or ischemic evaluation needed at this time      Kunal Muller MD

## 2018-11-16 NOTE — CONSULT NOTE ADULT - REASON FOR ADMISSION
SOB, Vaginal bleeding and weakness.

## 2018-11-16 NOTE — PROGRESS NOTE ADULT - SUBJECTIVE AND OBJECTIVE BOX
No pain, no shortness of breath      VITAL:  T(C): , Max: 37.7 (11-15-18 @ 21:12)  T(F): , Max: 99.9 (11-15-18 @ 21:12)  HR: 95 (11-16-18 @ 04:19)  BP: 104/73 (11-16-18 @ 04:19)  RR: 18 (11-16-18 @ 04:19)  SpO2: 96% (11-16-18 @ 04:19)  urine output 2200cc/12h    PHYSICAL EXAM:  Constitutional: NAD, Alert  HEENT: NCAT, MMM  Neck: Supple, (+) JVD  Respiratory: CTA b/l  Cardiovascular: reg, tachy s1s2  Gastrointestinal: BS+, soft, NT, (+)mild distension  Extremities: 2+ b/l LE edema  Neurological: no focal deficits; strength grossly intact  Back: no CVAT b/l  Skin: No rashes, no nevi    LABS:                        10.0   8.49  )-----------( 108      ( 15 Nov 2018 07:53 )             31.7     Na(133)/K(2.6)/Cl(84)/HCO3(33)/BUN(13)/Cr(0.74)Glu(115)/Ca(6.8)/Mg(1.2)/PO4(4.1)    11-16 @ 06:11  Na(133)/K(3.9)/Cl(85)/HCO3(33)/BUN(15)/Cr(0.78)Glu(147)/Ca(7.3)/Mg(1.7)/PO4(3.8)    11-15 @ 06:34  Na(131)/K(3.2)/Cl(84)/HCO3(35)/BUN(15)/Cr(0.74)Glu(230)/Ca(7.3)/Mg(1.2)/PO4(4.0)    11-14 @ 23:09  Na(132)/K(3.4)/Cl(85)/HCO3(34)/BUN(18)/Cr(0.74)Glu(161)/Ca(7.7)/Mg(--)/PO4(--)    11-14 @ 06:00      ASSESSMENT: 63F w/ HTN, DM2, thyroid CA s/p resection, acquired hypoparathyroidism, and severe HFrEF, 11/6/18 a/w vaginal bleeding/MAGNUS/electrolyte derangements/ acute on chronic HFrEF    (1)Renal - MAGNUS - Prerenally mediated from decompensated CHF. Now resolved.    (2)Hypokalemia -significantly worse as of today - in setting of placement on bumex gtt/aggressive diuresis overnight    (3)Hypomagnesemia - worsening, in association with diuresis    (4)Bone - hypocalcemia/hyperphosphatemia due to hypoparathyroidism (s/p iatrogenic parathyroidectomy). Very chronic. Improved relative to admission, although downtrending as of today. The drop in calcium today could be from the Bumex; hypomagnesemia could also be playing a role here.     (5)CV - much improved volume status relative to admission. Now off Bumex and Primacor gtts.      RECOMMEND:  (1)Bumex gtt as ordered  (2)KCl repletion as ordered  (3)Mg 2gm IV x 2  (4)Calcium 1gm IV x 1, in addition to the oral calcium as ordered  (5)Would increase Spironolactone to 50 bid for now, as much as anything in effort to help manage the electrolytes            Brian Henry MD  [facility]  [phone]

## 2018-11-16 NOTE — PROGRESS NOTE ADULT - PROBLEM SELECTOR PLAN 5
she has been treated empirically for 10 days, which appears to be adequate given lack of symptoms, would consider stopping Abx

## 2018-11-16 NOTE — PROGRESS NOTE ADULT - ASSESSMENT
The Pt is a 61 y/o woman with h/o NICM (LVEF 20%, LVIDd 6.5 cm), initially identified in 2013, s/p CRT-D (6/2017), Mod-Severe MR, Severe TR, HTN, HLD, DM II who presented to the ED with c/o vaginal bleeding in the setting of an elevated INR, which was likely the result of Congestive Hepatopathy. She has multiple findings that suggest that she is in Cardiogenic Shock complicated by UTI, and has been started on empiric inotropic support. She improved significantly with inotropic support and diuresis, but had a 3 lb weight gain after transitioning to oral diuretic therapy and was restarted on IV diuretics, to which she has responded favorably, but remains with evidence of elevated filling pressures.     On interview today, she was unable to demonstrate adequate understanding of her medical condition and appeared to lack the capacity to make medical decisions with regard to her Cardiomyopathy.

## 2018-11-16 NOTE — PROGRESS NOTE ADULT - SUBJECTIVE AND OBJECTIVE BOX
Interval events: pt seen and examined. She denies abdominal pain, n/v. Tolerating PO well.     MEDICATIONS  (STANDING):  aspirin  chewable 81 milliGRAM(s) Oral daily  buMETAnide Infusion 1 mG/Hr (5 mL/Hr) IV Continuous <Continuous>  calcitriol   Capsule 0.5 MICROGram(s) Oral daily  calcium acetate 2001 milliGRAM(s) Oral three times a day with meals  calcium carbonate 1250 mG  + Vitamin D (OsCal 500 + D) 2 Tablet(s) Oral three times a day  calcium gluconate IVPB 1 Gram(s) IV Intermittent once  cefpodoxime 200 milliGRAM(s) Oral every 12 hours  gabapentin 200 milliGRAM(s) Oral three times a day  influenza   Vaccine 0.5 milliLiter(s) IntraMuscular once  insulin lispro (HumaLOG) corrective regimen sliding scale   SubCutaneous three times a day before meals  insulin lispro (HumaLOG) corrective regimen sliding scale   SubCutaneous at bedtime  levothyroxine 175 MICROGram(s) Oral daily  milrinone Infusion 0.25 MICROgram(s)/kG/Min (7.26 mL/Hr) IV Continuous <Continuous>  potassium chloride    Tablet ER 40 milliEquivalent(s) Oral every 4 hours  potassium chloride  10 mEq/100 mL IVPB 10 milliEquivalent(s) IV Intermittent every 1 hour  rifaximin 550 milliGRAM(s) Oral two times a day  spironolactone 50 milliGRAM(s) Oral two times a day    MEDICATIONS  (PRN):      Allergies    Isordil (Headache)  penicillin (Rash)    Intolerances        Review of Systems:    General:  No wt loss, fevers, chills, night sweats,fatigue,   Eyes:  Good vision, no reported pain  ENT:  No sore throat, pain, runny nose, dysphagia  CV:  No pain, palpitations, hypo/hypertension  Resp:  No dyspnea, cough, tachypnea, wheezing  GI:  No pain, No nausea, No vomiting, No diarrhea, No constipation, No weight loss, No fever, No pruritis, No rectal bleeding, No melena, No dysphagia  :  No pain, bleeding, incontinence, nocturia  Muscle:  No pain, weakness  Neuro:  No weakness, tingling, memory problems  Psych:  No fatigue, insomnia, mood problems, depression  Endocrine:  No polyuria, polydypsia, cold/heat intolerance  Heme:  No petechiae, ecchymosis, easy bruisability  Skin:  No rash, tattoos, scars, edema      Vital Signs Last 24 Hrs  T(C): 36.8 (16 Nov 2018 04:19), Max: 37.7 (15 Nov 2018 21:12)  T(F): 98.2 (16 Nov 2018 04:19), Max: 99.9 (15 Nov 2018 21:12)  HR: 95 (16 Nov 2018 04:19) (95 - 111)  BP: 104/73 (16 Nov 2018 04:19) (104/73 - 108/76)  BP(mean): --  RR: 18 (16 Nov 2018 04:19) (17 - 18)  SpO2: 96% (16 Nov 2018 04:19) (96% - 100%)    PHYSICAL EXAM:    Constitutional: NAD, well-developed  HEENT: EOMI, throat clear  Neck: No LAD, supple  Respiratory: CTA and P  Cardiovascular: S1 and S2, RRR, no M  Gastrointestinal: BS+, soft, NT/ND, neg HSM,  Extremities: No peripheral edema, neg clubing, cyanosis  Vascular: 2+ peripheral pulses  Neurological: A/O x 3, no focal deficits  Psychiatric: Normal mood, normal affect  Skin: No rashes      LABS:                        10.0   7.65  )-----------( 125      ( 16 Nov 2018 07:55 )             32.3     11-16    133<L>  |  84<L>  |  13  ----------------------------<  115<H>  2.6<LL>   |  33<H>  |  0.74    Ca    6.8<L>      16 Nov 2018 06:11  Phos  4.1     11-16  Mg     1.2     11-16            RADIOLOGY & ADDITIONAL TESTS:

## 2018-11-16 NOTE — PROGRESS NOTE ADULT - PROBLEM SELECTOR PLAN 1
increase Milrinone to 0.375   - continue Bumetanide 1 mg/hr   - agree with increased Spironolactone frequency   discussed the possible need for advanced therapies and encouraged outpatient follow up in [facility] Clinic (appointments have been scheduled)

## 2018-11-16 NOTE — CONSULT NOTE ADULT - SUBJECTIVE AND OBJECTIVE BOX
Chart reviewed and patient seen by undersigned    Asked to consult for disposition making ability    Historians include chart, the pt., the pt's RICHARD Franco    History of Present Illness  The patient is a 63 year old BF originally from [facility]. She was admitted here on 11/6/18 for dyspnea and vaginal bleeding and weakness. The pt. was found to have acute on chronic congestive heart failure.  told staff the pt. is chronically noncompliant with treatment and has multiple admissions for cardiogenic shock. Found here to be noncompliant with fluids (she drinks water despite being told not to do so). Staff is concerned that the pt. expressed she will leave here to go home today and so psychiatry was called to assess her capacity to make this decision. Also found to be hyponatremic. She minimizes the amount of fluids she ingests here. She admits to being depressed over being here.     Past Psychiatric History  The pt. denies    Psychosocial History  Works as a . Lives with  and one daughter. Has 4 daughters. She denies smoking cigarettes, drinking alcohol, and using illicit drugs.     PAST MEDICAL & SURGICAL HISTORY:  Asthma  CHF (congestive heart failure): with EF of 10% s/p AICD  DM (diabetes mellitus)  HTN (hypertension)  Thyroid ca  AICD (automatic cardioverter/defibrillator) present  S/P thyroidectomy      FAMILY HISTORY:  No pertinent family history in first degree relatives      Vital Signs Last 24 Hrs  T(C): 36.9 (16 Nov 2018 11:28), Max: 37.7 (15 Nov 2018 21:12)  T(F): 98.5 (16 Nov 2018 11:28), Max: 99.9 (15 Nov 2018 21:12)  HR: 104 (16 Nov 2018 11:28) (95 - 110)  BP: 94/65 (16 Nov 2018 11:28) (94/65 - 108/76)  BP(mean): --  RR: 17 (16 Nov 2018 11:28) (17 - 18)  SpO2: 100% (16 Nov 2018 11:28) (96% - 100%)                          10.0   7.65  )-----------( 125      ( 16 Nov 2018 07:55 )             32.3       11-16    133<L>  |  84<L>  |  13  ----------------------------<  115<H>  2.6<LL>   |  33<H>  |  0.74    Ca    6.8<L>      16 Nov 2018 06:11  Phos  4.1     11-16  Mg     1.2     11-16              MEDICATIONS  (STANDING):  aspirin  chewable 81 milliGRAM(s) Oral daily  buMETAnide Infusion 1 mG/Hr (5 mL/Hr) IV Continuous <Continuous>  calcitriol   Capsule 0.5 MICROGram(s) Oral daily  calcium acetate 2001 milliGRAM(s) Oral three times a day with meals  calcium carbonate 1250 mG  + Vitamin D (OsCal 500 + D) 2 Tablet(s) Oral three times a day  cefpodoxime 200 milliGRAM(s) Oral every 12 hours  gabapentin 200 milliGRAM(s) Oral three times a day  influenza   Vaccine 0.5 milliLiter(s) IntraMuscular once  insulin lispro (HumaLOG) corrective regimen sliding scale   SubCutaneous three times a day before meals  insulin lispro (HumaLOG) corrective regimen sliding scale   SubCutaneous at bedtime  levothyroxine 175 MICROGram(s) Oral daily  milrinone Infusion 0.25 MICROgram(s)/kG/Min (7.26 mL/Hr) IV Continuous <Continuous>  potassium chloride    Tablet ER 40 milliEquivalent(s) Oral every 4 hours  rifaximin 550 milliGRAM(s) Oral two times a day  spironolactone 50 milliGRAM(s) Oral two times a day    MEDICATIONS  (PRN):      BF in chair,  calm, cooperative, alert and oriented x 3 though she took a long time to recall the year. No psychomotor abnormalities. Insight and judgment are impaired. I explained to her on several occasions that she needs her CHF and fluid status better controlled or otherwise she can die from this. When I then asked her to explain why the doctors want her to stay in the hospital, she could not recall the reason. When I then asked her if there is any danger to going home now, she replied "no" and failed to recall my above discussions of her CHF/fluid status. She minimizes the amount of fluids she ingests here. Speech is coherent with normal rate and volume. No hallucinations nor delusions. The patient denied suicidal and homicidal ideation and plan. Mood is stressed by being hospitalized  and affect full range and appropriate. Attention and concentration fair but decreased short term memory as above. Long term memory within normal limits.     Suicidal risk assessment  Risk factors include having chronic medical problems and STM decline  Protective factors include no plan/past attempts/psychosis/substance abuse/guns

## 2018-11-16 NOTE — PROGRESS NOTE ADULT - ATTENDING COMMENTS
Briefly, 62 year old F h/o HTN, HLD, DM II, thyroid cancer s/p thyroidectomy c/b hypocalcemia, HFrEF (LVEF 10%, LVIDd 6.6 cm; first diagnosed in 2013), s/p CRT-D placement in June 2017 with multiple recurrent admissions for cardiogenic shock requiring inotropes who presented with vaginal bleeding in setting of supratherapeutic INR which was 2/2 congestive hepatopathy. Was found to be likely low output heart failure and started on inotropes with diuretics with improvement. Follows with Dr. Robbins. Switched to oral diuretics with inadequate response and weight actually increased. Started on bumex gtt with good effect however she does not understand the rationale of requiring these medications and appears worse than prior. On exam, JVD approx 18 cm, RRR, grade II/VI systolic murmur, S3, CTAB, nontender abdomen, trace edema, asterixis. Labs reviewed - K 2.6, BUN/Cr 18/0.7, TBili 4.3 (downtrending), Ca 8.6. RHC on 5/25/18 which showed RA of 28, PCWP 37, PA 57/39/46 , EDP 23, PA sat 34.7%, CO 2.72, CI 1.21. TTE reviewed - EF 10%, LVEDD 6.6 cm, mod MR, severe TR (malcoaptation). Overall stage D HF, NYHA class IV with plan for home inotropes via PICC as patient adamant about not being evaluated for LVAD currently, however have concerns about her capacity to make decisions  - volume overloaded; discussed with patient that she will not do well if she is discharged today as she did not respond well to oral diuretics but does not appear to understand; continue bumex gtt at 1 mg/hr  - hypokalemia; agree with increaseing marcel to 50 mg bid   - standing weights daily  - increase milrinone to 0.375 mcg/kg/min  - has asterixis and likely cirrhosis; ammonia wnl; lactulose/rifaximin as above  - advised pt against leaving as she is decompensated; does not have capacity to make decisions

## 2018-11-16 NOTE — CHART NOTE - NSCHARTNOTEFT_GEN_A_CORE
MEDICINE PA MEDICINE PA     Notified by RN that patient with 15 beats MEDICINE PA     Notified by RN that patient with 15 beats WCT HR up to 110s. Pt examined at bedside by me. Resting comfortably in bed. Denies CP, SOB, nausea, vomiting, abdominal pain, palpitations, dizziness, changes in vision.     1. NSVT likely 2/2 electrolyte abnormalities while on bumex gtt   - BMP, mag, phosph stat ordered   - Continue close monitoring of clinical status/vital signs   - Cardio/HF follow up   - Endorsed to primary team in AM. Attending to follow.     FABIEN RoseC   Department of Medicine   Spectra [phone]

## 2018-11-16 NOTE — PROGRESS NOTE ADULT - ASSESSMENT
64yo post-menopausal F w/ pmh of CHF, thyroid ca, HTN, DM, daily ASA user,  c/o vaginal bleeding that she noticed this morning. Pt states she woke up feeling fatigued with abdominal pressure and that when she sat down, she noticed that she had bled through her underwear. Pt has not experienced any bleeding episodes like this in the past. she admits to normal urinary and bowel functions and denies any HA, dizziness, SOB, CP, N/V/D, constipation. Pt has no known uterine pathologies and has not seen a GYN in 15-20 years. Currently Pt reports fatigue and daughter states she has been in this state for the past couple weeks since she was discharged from hospital for CHF exacerbation.      Problem/Plan - 1:  ·  Problem: Acute on chronic systolic congestive heart failure.  Plan: Heart Failure team helping . On Bumex drip.  S/P PICC Line.     Problem/Plan - 2:  ·  Problem: Acute renal failure.  Plan: Creatinine better.  Renal following .     Problem/Plan - 3:  ·  Problem: Hyperkalemia/ Hypokalemia/ hypomagnesemia  .  Plan: Correcting.      Problem/Plan - 4:  ·  Problem: Hyponatremia.  Plan: Correcting . Management per renal.      Problem/Plan - 5:  ·  Problem:  Hypocalcemia.  Plan: Corrected.  Replacing IV and PO ..      Problem/Plan - 6:  Problem:  Jaundice, hepatocellular. Plan: Likely from CHF . Monitoring LFT .GI following .     Problem/Plan - 7:  ·  Problem: DM (diabetes mellitus).  Plan: SSI and Lantus .Sugars in good control.      Problem/Plan - 8:  ·  Problem: UTI .  Plan: IV /PO Abxs for 5 days .     Problem/Plan - 9:  ·  Problem: Hypothyroidism.  Plan: Synthroid home dose and will check TFT in am.      Problem/Plan - 10:  Problem: Vaginal bleeding. Plan; GYn consult consulted and awaiting Endometrial BX so will reconsult GYN.       Problem/Plan - 11:  ·  Problem: Coagulopathy .  Plan: Sec to Liver injury. INR better. Holding DVT prophylaxis as vaginal bleeding.       Problem/Plan - 12:  ·  Problem: Feet pain sec to Diabetic Neuropathy  .  Plan: Resolved . Increased  Neurontin. Normal  B12 and Folate as well TFT.      Problem/Plan - 13:  ·  Problem: Mod to Severe MR and Severe TR .  Plan: Structural Heart team following.     Psychiatry consult noted. Patient lacks capacity to make disposition decisions.

## 2018-11-16 NOTE — PROGRESS NOTE ADULT - SUBJECTIVE AND OBJECTIVE BOX
INTERVAL HPI/OVERNIGHT EVENTS:  Vital Signs Last 24 Hrs  T(C): 36.8 (16 Nov 2018 04:19), Max: 37.7 (15 Nov 2018 21:12)  T(F): 98.2 (16 Nov 2018 04:19), Max: 99.9 (15 Nov 2018 21:12)  HR: 95 (16 Nov 2018 04:19) (95 - 111)  BP: 104/73 (16 Nov 2018 04:19) (104/73 - 108/76)  BP(mean): --  RR: 18 (16 Nov 2018 04:19) (17 - 18)  SpO2: 96% (16 Nov 2018 04:19) (96% - 100%)  I&O's Summary    15 Nov 2018 07:01  -  16 Nov 2018 07:00  --------------------------------------------------------  IN: 770 mL / OUT: 2500 mL / NET: -1730 mL    16 Nov 2018 07:01  -  16 Nov 2018 11:27  --------------------------------------------------------  IN: 0 mL / OUT: 800 mL / NET: -800 mL      MEDICATIONS  (STANDING):  aspirin  chewable 81 milliGRAM(s) Oral daily  buMETAnide Infusion 1 mG/Hr (5 mL/Hr) IV Continuous <Continuous>  calcitriol   Capsule 0.5 MICROGram(s) Oral daily  calcium acetate 2001 milliGRAM(s) Oral three times a day with meals  calcium carbonate 1250 mG  + Vitamin D (OsCal 500 + D) 2 Tablet(s) Oral three times a day  calcium gluconate IVPB 1 Gram(s) IV Intermittent once  cefpodoxime 200 milliGRAM(s) Oral every 12 hours  gabapentin 200 milliGRAM(s) Oral three times a day  influenza   Vaccine 0.5 milliLiter(s) IntraMuscular once  insulin lispro (HumaLOG) corrective regimen sliding scale   SubCutaneous three times a day before meals  insulin lispro (HumaLOG) corrective regimen sliding scale   SubCutaneous at bedtime  levothyroxine 175 MICROGram(s) Oral daily  milrinone Infusion 0.25 MICROgram(s)/kG/Min (7.26 mL/Hr) IV Continuous <Continuous>  potassium chloride    Tablet ER 40 milliEquivalent(s) Oral every 4 hours  potassium chloride  10 mEq/100 mL IVPB 10 milliEquivalent(s) IV Intermittent every 1 hour  rifaximin 550 milliGRAM(s) Oral two times a day  spironolactone 50 milliGRAM(s) Oral two times a day    MEDICATIONS  (PRN):    LABS:                        10.0   7.65  )-----------( 125      ( 16 Nov 2018 07:55 )             32.3     11-16    133<L>  |  84<L>  |  13  ----------------------------<  115<H>  2.6<LL>   |  33<H>  |  0.74    Ca    6.8<L>      16 Nov 2018 06:11  Phos  4.1     11-16  Mg     1.2     11-16          CAPILLARY BLOOD GLUCOSE      POCT Blood Glucose.: 138 mg/dL (16 Nov 2018 07:54)  POCT Blood Glucose.: 181 mg/dL (15 Nov 2018 21:16)  POCT Blood Glucose.: 103 mg/dL (15 Nov 2018 17:06)  POCT Blood Glucose.: 169 mg/dL (15 Nov 2018 13:41)  POCT Blood Glucose.: 205 mg/dL (15 Nov 2018 11:55)          REVIEW OF SYSTEMS:  CONSTITUTIONAL: No fever, weight loss, or fatigue  EYES: No eye pain, visual disturbances, or discharge  ENMT:  No difficulty hearing, tinnitus, vertigo; No sinus or throat pain  NECK: No pain or stiffness  BREASTS: No pain, masses, or nipple discharge  RESPIRATORY: No cough, wheezing, chills or hemoptysis; No shortness of breath  CARDIOVASCULAR: No chest pain, palpitations, dizziness, or leg swelling  GASTROINTESTINAL: No abdominal or epigastric pain. No nausea, vomiting, or hematemesis; No diarrhea or constipation. No melena or hematochezia.  GENITOURINARY: No dysuria, frequency, hematuria, or incontinence  NEUROLOGICAL: No headaches, memory loss, loss of strength, numbness, or tremors  SKIN: No itching, burning, rashes, or lesions   LYMPH NODES: No enlarged glands  ENDOCRINE: No heat or cold intolerance; No hair loss  MUSCULOSKELETAL: No joint pain or swelling; No muscle, back, or extremity pain  PSYCHIATRIC: No depression, anxiety, mood swings, or difficulty sleeping  HEME/LYMPH: No easy bruising, or bleeding gums  ALLERY AND IMMUNOLOGIC: No hives or eczema    RADIOLOGY & ADDITIONAL TESTS:    Consultant(s) Notes Reviewed:  [x ] YES  [ ] NO    PHYSICAL EXAM:  GENERAL: NAD, well-groomed, well-developed,not in any distress ,  HEAD:  Atraumatic, Normocephalic  EYES: EOMI, PERRLA, conjunctiva and sclera clear  ENMT: No tonsillar erythema, exudates, or enlargement; Moist mucous membranes, Good dentition, No lesions  NECK: Supple, No JVD, Normal thyroid  NERVOUS SYSTEM:  Alert & Oriented X3, No focal deficit   CHEST/LUNG: Good air entry bilateral with no  rales, rhonchi, wheezing, or rubs  HEART: Regular rate and rhythm; No murmurs, rubs, or gallops  ABDOMEN: Soft, Nontender, Nondistended; Bowel sounds present  EXTREMITIES:  2+ Peripheral Pulses, No clubbing, cyanosis, or edema  SKIN: No rashes or lesions    Care Discussed with Consultants/Other Providers [ x] YES  [ ] NO INTERVAL HPI/OVERNIGHT EVENTS: I feel slightly better but didn't sleep last night .   Vital Signs Last 24 Hrs  T(C): 36.8 (16 Nov 2018 04:19), Max: 37.7 (15 Nov 2018 21:12)  T(F): 98.2 (16 Nov 2018 04:19), Max: 99.9 (15 Nov 2018 21:12)  HR: 95 (16 Nov 2018 04:19) (95 - 111)  BP: 104/73 (16 Nov 2018 04:19) (104/73 - 108/76)  BP(mean): --  RR: 18 (16 Nov 2018 04:19) (17 - 18)  SpO2: 96% (16 Nov 2018 04:19) (96% - 100%)  I&O's Summary    15 Nov 2018 07:01  -  16 Nov 2018 07:00  --------------------------------------------------------  IN: 770 mL / OUT: 2500 mL / NET: -1730 mL    16 Nov 2018 07:01  -  16 Nov 2018 11:27  --------------------------------------------------------  IN: 0 mL / OUT: 800 mL / NET: -800 mL      MEDICATIONS  (STANDING):  aspirin  chewable 81 milliGRAM(s) Oral daily  buMETAnide Infusion 1 mG/Hr (5 mL/Hr) IV Continuous <Continuous>  calcitriol   Capsule 0.5 MICROGram(s) Oral daily  calcium acetate 2001 milliGRAM(s) Oral three times a day with meals  calcium carbonate 1250 mG  + Vitamin D (OsCal 500 + D) 2 Tablet(s) Oral three times a day  calcium gluconate IVPB 1 Gram(s) IV Intermittent once  cefpodoxime 200 milliGRAM(s) Oral every 12 hours  gabapentin 200 milliGRAM(s) Oral three times a day  influenza   Vaccine 0.5 milliLiter(s) IntraMuscular once  insulin lispro (HumaLOG) corrective regimen sliding scale   SubCutaneous three times a day before meals  insulin lispro (HumaLOG) corrective regimen sliding scale   SubCutaneous at bedtime  levothyroxine 175 MICROGram(s) Oral daily  milrinone Infusion 0.25 MICROgram(s)/kG/Min (7.26 mL/Hr) IV Continuous <Continuous>  potassium chloride    Tablet ER 40 milliEquivalent(s) Oral every 4 hours  potassium chloride  10 mEq/100 mL IVPB 10 milliEquivalent(s) IV Intermittent every 1 hour  rifaximin 550 milliGRAM(s) Oral two times a day  spironolactone 50 milliGRAM(s) Oral two times a day    MEDICATIONS  (PRN):    LABS:                        10.0   7.65  )-----------( 125      ( 16 Nov 2018 07:55 )             32.3     11-16    133<L>  |  84<L>  |  13  ----------------------------<  115<H>  2.6<LL>   |  33<H>  |  0.74    Ca    6.8<L>      16 Nov 2018 06:11  Phos  4.1     11-16  Mg     1.2     11-16          CAPILLARY BLOOD GLUCOSE      POCT Blood Glucose.: 138 mg/dL (16 Nov 2018 07:54)  POCT Blood Glucose.: 181 mg/dL (15 Nov 2018 21:16)  POCT Blood Glucose.: 103 mg/dL (15 Nov 2018 17:06)  POCT Blood Glucose.: 169 mg/dL (15 Nov 2018 13:41)  POCT Blood Glucose.: 205 mg/dL (15 Nov 2018 11:55)          REVIEW OF SYSTEMS:  CONSTITUTIONAL: No fever, weight loss, or fatigue  EYES: No eye pain, visual disturbances, or discharge  ENMT:  No difficulty hearing, tinnitus, vertigo; No sinus or throat pain  NECK: No pain or stiffness  BREASTS: No pain, masses, or nipple discharge  RESPIRATORY: No cough, wheezing, chills or hemoptysis; No shortness of breath  CARDIOVASCULAR: No chest pain, palpitations, dizziness, or leg swelling  GASTROINTESTINAL: No abdominal or epigastric pain. No nausea, vomiting, or hematemesis; No diarrhea or constipation. No melena or hematochezia.  GENITOURINARY: No dysuria, frequency, hematuria, or incontinence  NEUROLOGICAL: No headaches, memory loss, loss of strength, numbness, or tremors  SKIN: No itching, burning, rashes, or lesions   LYMPH NODES: No enlarged glands  ENDOCRINE: No heat or cold intolerance; No hair loss  MUSCULOSKELETAL: No joint pain or swelling; No muscle, back, or extremity pain  PSYCHIATRIC: No depression, anxiety, mood swings, or difficulty sleeping  HEME/LYMPH: No easy bruising, or bleeding gums  ALLERY AND IMMUNOLOGIC: No hives or eczema    RADIOLOGY & ADDITIONAL TESTS:    Consultant(s) Notes Reviewed:  [x ] YES  [ ] NO    PHYSICAL EXAM:  GENERAL: NAD, ,not in any distress ,  HEAD:  Atraumatic, Normocephalic  EYES: EOMI, PERRLA, conjunctiva and sclera clear  ENMT: No tonsillar erythema, exudates, or enlargement; Moist mucous membranes, Good dentition, No lesions  NECK: Supple, No JVD, Normal thyroid  NERVOUS SYSTEM:  Alert & Oriented X3, No focal deficit   CHEST/LUNG: Good air entry bilateral with no  rales, rhonchi, wheezing, or rubs  HEART: Regular rate and rhythm; No murmurs, rubs, or gallops  ABDOMEN: Soft, Nontender, Nondistended; Bowel sounds present  EXTREMITIES:  2+ Peripheral Pulses, No clubbing, cyanosis, but less edema    Care Discussed with Consultants/Other Providers [ x] YES  [ ] NO

## 2018-11-17 LAB
ANION GAP SERPL CALC-SCNC: 14 MMOL/L — SIGNIFICANT CHANGE UP (ref 5–17)
ANION GAP SERPL CALC-SCNC: 17 MMOL/L — SIGNIFICANT CHANGE UP (ref 5–17)
BUN SERPL-MCNC: 14 MG/DL — SIGNIFICANT CHANGE UP (ref 7–23)
BUN SERPL-MCNC: 15 MG/DL — SIGNIFICANT CHANGE UP (ref 7–23)
CALCIUM SERPL-MCNC: 7.5 MG/DL — LOW (ref 8.4–10.5)
CALCIUM SERPL-MCNC: 7.7 MG/DL — LOW (ref 8.4–10.5)
CHLORIDE SERPL-SCNC: 84 MMOL/L — LOW (ref 96–108)
CHLORIDE SERPL-SCNC: 86 MMOL/L — LOW (ref 96–108)
CO2 SERPL-SCNC: 31 MMOL/L — SIGNIFICANT CHANGE UP (ref 22–31)
CO2 SERPL-SCNC: 32 MMOL/L — HIGH (ref 22–31)
CREAT SERPL-MCNC: 0.81 MG/DL — SIGNIFICANT CHANGE UP (ref 0.5–1.3)
CREAT SERPL-MCNC: 0.81 MG/DL — SIGNIFICANT CHANGE UP (ref 0.5–1.3)
GLUCOSE BLDC GLUCOMTR-MCNC: 125 MG/DL — HIGH (ref 70–99)
GLUCOSE BLDC GLUCOMTR-MCNC: 147 MG/DL — HIGH (ref 70–99)
GLUCOSE BLDC GLUCOMTR-MCNC: 153 MG/DL — HIGH (ref 70–99)
GLUCOSE BLDC GLUCOMTR-MCNC: 160 MG/DL — HIGH (ref 70–99)
GLUCOSE SERPL-MCNC: 154 MG/DL — HIGH (ref 70–99)
GLUCOSE SERPL-MCNC: 157 MG/DL — HIGH (ref 70–99)
HCT VFR BLD CALC: 32.9 % — LOW (ref 34.5–45)
HGB BLD-MCNC: 10.4 G/DL — LOW (ref 11.5–15.5)
MAGNESIUM SERPL-MCNC: 1.6 MG/DL — SIGNIFICANT CHANGE UP (ref 1.6–2.6)
MAGNESIUM SERPL-MCNC: 1.8 MG/DL — SIGNIFICANT CHANGE UP (ref 1.6–2.6)
MCHC RBC-ENTMCNC: 23.6 PG — LOW (ref 27–34)
MCHC RBC-ENTMCNC: 31.7 GM/DL — LOW (ref 32–36)
MCV RBC AUTO: 74.4 FL — LOW (ref 80–100)
PHOSPHATE SERPL-MCNC: 4 MG/DL — SIGNIFICANT CHANGE UP (ref 2.5–4.5)
PLATELET # BLD AUTO: 131 K/UL — LOW (ref 150–400)
POTASSIUM SERPL-MCNC: 3.5 MMOL/L — SIGNIFICANT CHANGE UP (ref 3.5–5.3)
POTASSIUM SERPL-MCNC: 3.8 MMOL/L — SIGNIFICANT CHANGE UP (ref 3.5–5.3)
POTASSIUM SERPL-SCNC: 3.5 MMOL/L — SIGNIFICANT CHANGE UP (ref 3.5–5.3)
POTASSIUM SERPL-SCNC: 3.8 MMOL/L — SIGNIFICANT CHANGE UP (ref 3.5–5.3)
RBC # BLD: 4.42 M/UL — SIGNIFICANT CHANGE UP (ref 3.8–5.2)
RBC # FLD: 25 % — HIGH (ref 10.3–14.5)
SODIUM SERPL-SCNC: 132 MMOL/L — LOW (ref 135–145)
SODIUM SERPL-SCNC: 132 MMOL/L — LOW (ref 135–145)
WBC # BLD: 8 K/UL — SIGNIFICANT CHANGE UP (ref 3.8–10.5)
WBC # FLD AUTO: 8 K/UL — SIGNIFICANT CHANGE UP (ref 3.8–10.5)

## 2018-11-17 PROCEDURE — 99233 SBSQ HOSP IP/OBS HIGH 50: CPT | Mod: GC

## 2018-11-17 RX ORDER — MAGNESIUM SULFATE 500 MG/ML
2 VIAL (ML) INJECTION ONCE
Qty: 0 | Refills: 0 | Status: DISCONTINUED | OUTPATIENT
Start: 2018-11-17 | End: 2018-11-17

## 2018-11-17 RX ORDER — MAGNESIUM SULFATE 500 MG/ML
2 VIAL (ML) INJECTION ONCE
Qty: 0 | Refills: 0 | Status: COMPLETED | OUTPATIENT
Start: 2018-11-17 | End: 2018-11-17

## 2018-11-17 RX ORDER — BUMETANIDE 0.25 MG/ML
2 INJECTION INTRAMUSCULAR; INTRAVENOUS
Qty: 20 | Refills: 0 | Status: DISCONTINUED | OUTPATIENT
Start: 2018-11-17 | End: 2018-11-20

## 2018-11-17 RX ORDER — POTASSIUM CHLORIDE 20 MEQ
40 PACKET (EA) ORAL ONCE
Qty: 0 | Refills: 0 | Status: COMPLETED | OUTPATIENT
Start: 2018-11-17 | End: 2018-11-17

## 2018-11-17 RX ORDER — MAGNESIUM SULFATE 500 MG/ML
1 VIAL (ML) INJECTION ONCE
Qty: 0 | Refills: 0 | Status: COMPLETED | OUTPATIENT
Start: 2018-11-17 | End: 2018-11-17

## 2018-11-17 RX ORDER — POTASSIUM CHLORIDE 20 MEQ
40 PACKET (EA) ORAL EVERY 4 HOURS
Qty: 0 | Refills: 0 | Status: DISCONTINUED | OUTPATIENT
Start: 2018-11-17 | End: 2018-11-17

## 2018-11-17 RX ORDER — POTASSIUM CHLORIDE 20 MEQ
20 PACKET (EA) ORAL ONCE
Qty: 0 | Refills: 0 | Status: COMPLETED | OUTPATIENT
Start: 2018-11-17 | End: 2018-11-17

## 2018-11-17 RX ORDER — BUMETANIDE 0.25 MG/ML
4 INJECTION INTRAMUSCULAR; INTRAVENOUS ONCE
Qty: 0 | Refills: 0 | Status: COMPLETED | OUTPATIENT
Start: 2018-11-17 | End: 2018-11-17

## 2018-11-17 RX ADMIN — BUMETANIDE 10 MG/HR: 0.25 INJECTION INTRAMUSCULAR; INTRAVENOUS at 12:45

## 2018-11-17 RX ADMIN — Medication 200 MILLIGRAM(S): at 06:13

## 2018-11-17 RX ADMIN — SPIRONOLACTONE 50 MILLIGRAM(S): 25 TABLET, FILM COATED ORAL at 06:13

## 2018-11-17 RX ADMIN — Medication 2 TABLET(S): at 06:14

## 2018-11-17 RX ADMIN — Medication 50 GRAM(S): at 00:18

## 2018-11-17 RX ADMIN — Medication 1: at 12:36

## 2018-11-17 RX ADMIN — Medication 5 MILLILITER(S): at 19:37

## 2018-11-17 RX ADMIN — Medication 2001 MILLIGRAM(S): at 12:36

## 2018-11-17 RX ADMIN — Medication 175 MICROGRAM(S): at 06:14

## 2018-11-17 RX ADMIN — Medication 100 MILLIEQUIVALENT(S): at 00:18

## 2018-11-17 RX ADMIN — BUMETANIDE 132 MILLIGRAM(S): 0.25 INJECTION INTRAMUSCULAR; INTRAVENOUS at 15:03

## 2018-11-17 RX ADMIN — Medication 40 MILLIEQUIVALENT(S): at 15:44

## 2018-11-17 RX ADMIN — Medication 5 MILLILITER(S): at 06:12

## 2018-11-17 RX ADMIN — GABAPENTIN 200 MILLIGRAM(S): 400 CAPSULE ORAL at 06:14

## 2018-11-17 RX ADMIN — GABAPENTIN 200 MILLIGRAM(S): 400 CAPSULE ORAL at 15:03

## 2018-11-17 RX ADMIN — Medication 2 TABLET(S): at 21:12

## 2018-11-17 RX ADMIN — MILRINONE LACTATE 10.89 MICROGRAM(S)/KG/MIN: 1 INJECTION, SOLUTION INTRAVENOUS at 17:13

## 2018-11-17 RX ADMIN — Medication 2001 MILLIGRAM(S): at 17:12

## 2018-11-17 RX ADMIN — Medication 100 MILLIGRAM(S): at 21:12

## 2018-11-17 RX ADMIN — SPIRONOLACTONE 50 MILLIGRAM(S): 25 TABLET, FILM COATED ORAL at 17:13

## 2018-11-17 RX ADMIN — Medication 20 MILLIEQUIVALENT(S): at 10:17

## 2018-11-17 RX ADMIN — Medication 100 MILLIEQUIVALENT(S): at 01:34

## 2018-11-17 RX ADMIN — Medication 1: at 17:12

## 2018-11-17 RX ADMIN — Medication 2 TABLET(S): at 15:03

## 2018-11-17 RX ADMIN — Medication 50 GRAM(S): at 10:17

## 2018-11-17 RX ADMIN — Medication 2001 MILLIGRAM(S): at 10:17

## 2018-11-17 RX ADMIN — Medication 81 MILLIGRAM(S): at 12:36

## 2018-11-17 RX ADMIN — CALCITRIOL 0.5 MICROGRAM(S): 0.5 CAPSULE ORAL at 12:36

## 2018-11-17 RX ADMIN — Medication 40 MILLIEQUIVALENT(S): at 00:18

## 2018-11-17 RX ADMIN — Medication 100 MILLIEQUIVALENT(S): at 02:44

## 2018-11-17 RX ADMIN — Medication 100 GRAM(S): at 23:10

## 2018-11-17 RX ADMIN — GABAPENTIN 200 MILLIGRAM(S): 400 CAPSULE ORAL at 21:12

## 2018-11-17 RX ADMIN — Medication 30 MILLILITER(S): at 23:53

## 2018-11-17 NOTE — PROGRESS NOTE ADULT - SUBJECTIVE AND OBJECTIVE BOX
Patient seen and examined in bed; no new events.  NAD.    REVIEW OF SYSTEMS:  As per HPI, otherwise 8 full 10 ROS were unremarkable    MEDICATIONS  (STANDING):  aspirin  chewable 81 milliGRAM(s) Oral daily  Biotene Dry Mouth Oral Rinse 5 milliLiter(s) Swish and Spit two times a day  buMETAnide Infusion 1 mG/Hr (5 mL/Hr) IV Continuous <Continuous>  calcitriol   Capsule 0.5 MICROGram(s) Oral daily  calcium acetate 2001 milliGRAM(s) Oral three times a day with meals  calcium carbonate 1250 mG  + Vitamin D (OsCal 500 + D) 2 Tablet(s) Oral three times a day  cefpodoxime 200 milliGRAM(s) Oral every 12 hours  gabapentin 200 milliGRAM(s) Oral three times a day  influenza   Vaccine 0.5 milliLiter(s) IntraMuscular once  insulin lispro (HumaLOG) corrective regimen sliding scale   SubCutaneous three times a day before meals  insulin lispro (HumaLOG) corrective regimen sliding scale   SubCutaneous at bedtime  levothyroxine 175 MICROGram(s) Oral daily  milrinone Infusion 0.375 MICROgram(s)/kG/Min (10.89 mL/Hr) IV Continuous <Continuous>  rifaximin 550 milliGRAM(s) Oral two times a day  spironolactone 50 milliGRAM(s) Oral two times a day      VITAL:  T(C): , Max: 37.2 (11-17-18 @ 04:00)  T(F): , Max: 98.9 (11-17-18 @ 04:00)  HR: 110 (11-17-18 @ 04:00)  BP: 110/81 (11-17-18 @ 04:00)  BP(mean): --  RR: 18 (11-17-18 @ 04:00)  SpO2: 93% (11-17-18 @ 04:00)  Wt(kg): --    I and O's:    11-16 @ 07:01  -  11-17 @ 07:00  --------------------------------------------------------  IN: 1070 mL / OUT: 1400 mL / NET: -330 mL          PHYSICAL EXAM:    Constitutional: NAD, Alert  HEENT: NCAT, MMM  Neck: Supple, (+) JVD  Respiratory: CTA b/l  Cardiovascular: reg, tachy s1s2  Gastrointestinal: BS+, soft, NT, (+)mild distension  Extremities: 2+ b/l LE edema  Neurological: no focal deficits; strength grossly intact  Back: no CVAT b/l  Skin: No rashes, no nevi    LABS:                        10.4   8.0   )-----------( 131      ( 17 Nov 2018 07:16 )             32.9     11-17    132<L>  |  86<L>  |  14  ----------------------------<  157<H>  3.5   |  32<H>  |  0.81    Ca    7.5<L>      17 Nov 2018 07:16  Phos  4.0     11-17  Mg     1.6     11-17      ASSESSMENT: 63F w/ HTN, DM2, thyroid CA s/p resection, acquired hypoparathyroidism, and severe HFrEF, 11/6/18 a/w vaginal bleeding/MAGNUS/electrolyte derangements/ acute on chronic HFrEF    (1)Renal - MAGNUS - Prerenally mediated from decompensated CHF. Now resolved; renal fxn remains stable     (2)Hypokalemia -in setting of placement on bumex gtt/aggressive diuresis overnight; s/p repletion; improved though borderline low    (3)Hypomagnesemia - in association with diuresis; slightly improved though low     (4)Bone - hypocalcemia/hyperphosphatemia due to hypoparathyroidism (s/p iatrogenic parathyroidectomy). Very chronic. Improved relative to admission, although downtrending as of today. The recent drop in calcium today could be from the Bumex; hypomagnesemia could also be playing a role here; s/p Ca+ IV administration yesterday; improved; acceptable for now     (5)CV - much improved volume status relative to admission. Now on Bumex and Primacor gtts; diuresing well; CHF on board     (6)Hyponatremia:  likely hypervolemic; slight downtrend this AM though acceptable       RECOMMEND:  (1)Bumex gtt as ordered; management per CHF   (2)KCl 20 meq PO x 1 dose   (3)Mg 2gm IV x 1  (4)Continue oral calcium as ordered; no need for IV this AM  (5)Continue Spironolactone 50 bid for now  (6)Strict I & Os   (7)Maintain 1L FR as ordered

## 2018-11-17 NOTE — PROGRESS NOTE ADULT - SUBJECTIVE AND OBJECTIVE BOX
S: Patient was seen and examined this morning.  Sitting in chair.  Denies any CP or SOB. Doing well.     ROS otherwise negative      MEDICATIONS  (STANDING):  aspirin  chewable 81 milliGRAM(s) Oral daily  Biotene Dry Mouth Oral Rinse 5 milliLiter(s) Swish and Spit two times a day  buMETAnide Infusion 2 mG/Hr (10 mL/Hr) IV Continuous <Continuous>  calcitriol   Capsule 0.5 MICROGram(s) Oral daily  calcium acetate 2001 milliGRAM(s) Oral three times a day with meals  calcium carbonate 1250 mG  + Vitamin D (OsCal 500 + D) 2 Tablet(s) Oral three times a day  gabapentin 200 milliGRAM(s) Oral three times a day  influenza   Vaccine 0.5 milliLiter(s) IntraMuscular once  insulin lispro (HumaLOG) corrective regimen sliding scale   SubCutaneous three times a day before meals  insulin lispro (HumaLOG) corrective regimen sliding scale   SubCutaneous at bedtime  levothyroxine 175 MICROGram(s) Oral daily  milrinone Infusion 0.375 MICROgram(s)/kG/Min (10.89 mL/Hr) IV Continuous <Continuous>  potassium chloride    Tablet ER 40 milliEquivalent(s) Oral once  rifaximin 550 milliGRAM(s) Oral two times a day  spironolactone 50 milliGRAM(s) Oral two times a day    MEDICATIONS  (PRN):      LABS:                        10.4   8.0   )-----------( 131      ( 17 Nov 2018 07:16 )             32.9     Hemoglobin: 10.4 g/dL (11-17 @ 07:16)  Hemoglobin: 10.0 g/dL (11-16 @ 07:55)  Hemoglobin: 10.0 g/dL (11-15 @ 07:53)  Hemoglobin: 10.4 g/dL (11-14 @ 07:43)  Hemoglobin: 10.5 g/dL (11-13 @ 07:59)    11-17    132<L>  |  86<L>  |  14  ----------------------------<  157<H>  3.5   |  32<H>  |  0.81    Ca    7.5<L>      17 Nov 2018 07:16  Phos  4.0     11-17  Mg     1.6     11-17      Creatinine Trend: 0.81<--, 0.80<--, 0.74<--, 0.78<--, 0.74<--, 0.74<--           PHYSICAL EXAM  Vital Signs Last 24 Hrs  T(C): 37.2 (17 Nov 2018 04:00), Max: 37.2 (17 Nov 2018 04:00)  T(F): 98.9 (17 Nov 2018 04:00), Max: 98.9 (17 Nov 2018 04:00)  HR: 110 (17 Nov 2018 04:00) (104 - 110)  BP: 110/81 (17 Nov 2018 04:00) (98/69 - 110/81)  BP(mean): --  RR: 18 (17 Nov 2018 04:00) (18 - 18)  SpO2: 93% (17 Nov 2018 04:00) (93% - 99%)      lymphatic: No lymphadenopathy + edema in LE bilaterally   Cardiovascular: Normal S1 S2,RRR, No murmurs ,   Respiratory: Lungs clear to auscultation, normal effort 	  Gastrointestinal:  Soft, distended   Skin: No rashes, No ecchymoses, No cyanosis,       TELEMETRY:	      ECG: < from: 12 Lead ECG (11.06.18 @ 19:34) >  Atrial-sensed ventricular-paced rhythm  Biventricular pacemaker detected  ABNORMAL ECG    < end of copied text >    	  RADIOLOGY:   DIAGNOSTIC TESTING:  [ ] Echocardiogram: < from: Transthoracic Echocardiogram (08.23.18 @ 11:25) >  1. Tethered mitral valve leaflets with normal opening.  Moderate-severe mitral regurgitation.  2. Normal left ventricular internal dimensions and wall  thicknesses.  3. Severe global left ventricular systolic dysfunction.  4. Moderate right atrial enlargement.  A device wire is  noted in the right heart.  5. Right ventricular enlargement with decreased right  ventricular systolic function.  6. Normal tricuspid valve.  Severe tricuspid regurgitation.  7. Estimated pulmonary artery systolic pressure equals 24  mm Hg, assuming right atrial pressure equals 10  mm Hg,  consistent with normal pulmonary pressures.  *** Compared with echocardiogram of 6/29/2018, no  significant changes noted.    < end of copied text >    [ ]  Catheterization:  [ ] Stress Test:    OTHER: 	      ASSESSMENT/PLAN: 64 y/o F with PMHx of NICM s/p AICD, HTN, moderate-severe MR, DM, ? medication compliance who was admitted with volume overload and heart failure.    -continue with inotrope assisted diuresis per heart failure. Maintain net neg.   -no further interventional workup or ischemic evaluation needed at this time  -follow up heart failure  -follow up GYN  -dc planning per primary team    Adele Miller PA-C

## 2018-11-17 NOTE — PROGRESS NOTE ADULT - ATTENDING COMMENTS
Agree with above assessment and plan as outlined above.    - cont milrinone  - IV bumex    Augusto Grimes MD, FACC

## 2018-11-17 NOTE — PROGRESS NOTE ADULT - ASSESSMENT
The Pt is a 61 y/o woman with h/o NICM (LVEF 20%, LVIDd 6.5 cm), initially identified in 2013, s/p CRT-D (6/2017), Mod-Severe MR, Severe TR, HTN, HLD, DM II who presented to the ED with c/o vaginal bleeding in the setting of an elevated INR, which was likely the result of Congestive Hepatopathy. She has multiple findings that suggest that she is in Cardiogenic Shock complicated by UTI, and has been started on empiric inotropic support. She improved significantly with inotropic support and diuresis, but had a 3 lb weight gain after transitioning to oral diuretic therapy and was restarted on IV diuretics, to which she has responded favorably, but remains with evidence of elevated filling pressures.     On interview yesterday, she was unable to demonstrate adequate understanding of her medical condition and appeared to lack the capacity to make medical decisions with regard to her Cardiomyopathy.

## 2018-11-17 NOTE — PROGRESS NOTE ADULT - PROBLEM SELECTOR PLAN 1
continue Milrinone to 0.375   - increase Bumetanide to 2 mg/hr with 4 mg IV bolus dosing   - agree with increased Spironolactone frequency   - please check BMP/Mg Q12H while on diuretic infusion   discussed the possible need for advanced therapies and encouraged outpatient follow up in HF Clinic (appointments have been scheduled)

## 2018-11-17 NOTE — PROGRESS NOTE ADULT - PROBLEM SELECTOR PLAN 5
she has been treated empirically for 11 days, which appears to be adequate given lack of symptoms, would recommend stopping Abx

## 2018-11-17 NOTE — PROGRESS NOTE ADULT - SUBJECTIVE AND OBJECTIVE BOX
EP ATTENDING    tele: NSR, biv pacing    no palpitations, no syncope, no angina, dyspnea and LE edema improving slowly    aspirin  chewable 81 milliGRAM(s) Oral daily  Biotene Dry Mouth Oral Rinse 5 milliLiter(s) Swish and Spit two times a day  buMETAnide Infusion 1 mG/Hr IV Continuous <Continuous>  calcitriol   Capsule 0.5 MICROGram(s) Oral daily  calcium acetate 2001 milliGRAM(s) Oral three times a day with meals  calcium carbonate 1250 mG  + Vitamin D (OsCal 500 + D) 2 Tablet(s) Oral three times a day  cefpodoxime 200 milliGRAM(s) Oral every 12 hours  gabapentin 200 milliGRAM(s) Oral three times a day  influenza   Vaccine 0.5 milliLiter(s) IntraMuscular once  insulin lispro (HumaLOG) corrective regimen sliding scale   SubCutaneous three times a day before meals  insulin lispro (HumaLOG) corrective regimen sliding scale   SubCutaneous at bedtime  levothyroxine 175 MICROGram(s) Oral daily  magnesium sulfate  IVPB 2 Gram(s) IV Intermittent once  milrinone Infusion 0.375 MICROgram(s)/kG/Min IV Continuous <Continuous>  potassium chloride    Tablet ER 40 milliEquivalent(s) Oral every 4 hours  rifaximin 550 milliGRAM(s) Oral two times a day  spironolactone 50 milliGRAM(s) Oral two times a day                            10.4   8.0   )-----------( 131      ( 17 Nov 2018 07:16 )             32.9       11-17    132<L>  |  86<L>  |  14  ----------------------------<  157<H>  3.5   |  32<H>  |  0.81    Ca    7.5<L>      17 Nov 2018 07:16  Phos  4.0     11-17  Mg     1.6     11-17        T(C): 37.2 (11-17-18 @ 04:00), Max: 37.2 (11-17-18 @ 04:00)  HR: 110 (11-17-18 @ 04:00) (104 - 110)  BP: 110/81 (11-17-18 @ 04:00) (98/69 - 110/81)  RR: 18 (11-17-18 @ 04:00) (18 - 18)  SpO2: 93% (11-17-18 @ 04:00) (93% - 99%)  Wt(kg): --    JVP 10  tachy, no murmurs  lungs with rales  soft nt/nd  + 2 edema    device interrogation normal  EKG: NSR, Biv pacing    A/P) She is a pleasant 62 y/o female PMH severe NICM (LVEF 10-15%) who had a [employer] Biv-ICD implanted at [facility]. A recent LHC at [facility] was also unremarkable. She now returns for with a severe decompensation of her NICM. Her device interrogation demonstrates excellent RA, RV and LV lead function. She denies palpitations nor high voltage therapy.     -continue lasix and milrinone as patient is still grossly volume overloaded, f/u cardiology  -f/u CHF consult for advanced therapies (VAD vs transplant)  -no need for AAD  -Her biv-icd was recently optimized by me. Future options including turning off BiV pacing to see if she responds better. This can be addressed when she's euvolemic  -will follow  -f/u with [facility] after discharge

## 2018-11-17 NOTE — PROGRESS NOTE ADULT - ATTENDING COMMENTS
Agree with above.   no further interventional workup needed at this time  continue with diuresis per cardiology and heart failure    Kunal Muller MD

## 2018-11-17 NOTE — PROGRESS NOTE ADULT - SUBJECTIVE AND OBJECTIVE BOX
INTERVAL HPI/OVERNIGHT EVENTS: Having cough   Vital Signs Last 24 Hrs  T(C): 37.2 (17 Nov 2018 04:00), Max: 37.2 (17 Nov 2018 04:00)  T(F): 98.9 (17 Nov 2018 04:00), Max: 98.9 (17 Nov 2018 04:00)  HR: 110 (17 Nov 2018 04:00) (104 - 110)  BP: 110/81 (17 Nov 2018 04:00) (98/69 - 110/81)  BP(mean): --  RR: 18 (17 Nov 2018 04:00) (18 - 18)  SpO2: 93% (17 Nov 2018 04:00) (93% - 99%)  I&O's Summary    16 Nov 2018 07:01  -  17 Nov 2018 07:00  --------------------------------------------------------  IN: 1070 mL / OUT: 1400 mL / NET: -330 mL      MEDICATIONS  (STANDING):  aspirin  chewable 81 milliGRAM(s) Oral daily  Biotene Dry Mouth Oral Rinse 5 milliLiter(s) Swish and Spit two times a day  buMETAnide Infusion 2 mG/Hr (10 mL/Hr) IV Continuous <Continuous>  calcitriol   Capsule 0.5 MICROGram(s) Oral daily  calcium acetate 2001 milliGRAM(s) Oral three times a day with meals  calcium carbonate 1250 mG  + Vitamin D (OsCal 500 + D) 2 Tablet(s) Oral three times a day  gabapentin 200 milliGRAM(s) Oral three times a day  influenza   Vaccine 0.5 milliLiter(s) IntraMuscular once  insulin lispro (HumaLOG) corrective regimen sliding scale   SubCutaneous three times a day before meals  insulin lispro (HumaLOG) corrective regimen sliding scale   SubCutaneous at bedtime  levothyroxine 175 MICROGram(s) Oral daily  milrinone Infusion 0.375 MICROgram(s)/kG/Min (10.89 mL/Hr) IV Continuous <Continuous>  rifaximin 550 milliGRAM(s) Oral two times a day  spironolactone 50 milliGRAM(s) Oral two times a day    MEDICATIONS  (PRN):    LABS:                        10.4   8.0   )-----------( 131      ( 17 Nov 2018 07:16 )             32.9     11-17    132<L>  |  86<L>  |  14  ----------------------------<  157<H>  3.5   |  32<H>  |  0.81    Ca    7.5<L>      17 Nov 2018 07:16  Phos  4.0     11-17  Mg     1.6     11-17          CAPILLARY BLOOD GLUCOSE      POCT Blood Glucose.: 160 mg/dL (17 Nov 2018 11:59)  POCT Blood Glucose.: 125 mg/dL (17 Nov 2018 07:55)  POCT Blood Glucose.: 177 mg/dL (16 Nov 2018 21:50)  POCT Blood Glucose.: 174 mg/dL (16 Nov 2018 16:41)          REVIEW OF SYSTEMS:  CONSTITUTIONAL: No fever, weight loss, or fatigue  EYES: No eye pain, visual disturbances, or discharge  RESPIRATORY:cough,  shortness of breath  CARDIOVASCULAR: No chest pain, palpitations, dizziness, or leg swelling  GASTROINTESTINAL: No abdominal or epigastric pain. No nausea, vomiting, or hematemesis; No diarrhea or constipation. No melena or hematochezia.  GENITOURINARY: No dysuria, frequency, hematuria, or incontinence  NEUROLOGICAL: No headaches, memory loss, loss of strength, numbness, or tremors    Consultant(s) Notes Reviewed:  [x ] YES  [ ] NO    PHYSICAL EXAM:  GENERAL: NAD, well-groomed, well-developed ,not in any distress ,  HEAD:  Atraumatic, Normocephalic  EYES: EOMI, PERRLA, conjunctiva and sclera clear  ENMT: No tonsillar erythema, exudates, or enlargement; Moist mucous membranes, Good dentition, No lesions  NECK: Supple, No JVD, Normal thyroid  NERVOUS SYSTEM:  Alert & Oriented X3, No focal deficit   CHEST/LUNG: Good air entry bilateral with no  rales, rhonchi, wheezing, or rubs  HEART: Regular rate and rhythm; No murmurs, rubs, or gallops  ABDOMEN: Soft, Nontender, Nondistended; Bowel sounds present  EXTREMITIES:  2+ Peripheral Pulses, No clubbing, cyanosis, less edema    Care Discussed with Consultants/Other Providers [ x] YES  [ ] NO

## 2018-11-17 NOTE — PROGRESS NOTE ADULT - SUBJECTIVE AND OBJECTIVE BOX
Interval Events: Interval events: pt seen and examined. She denies abdominal pain, n/v. Tolerating PO well.   + formed bm overnight    MEDICATIONS  (STANDING):  aspirin  chewable 81 milliGRAM(s) Oral daily  Biotene Dry Mouth Oral Rinse 5 milliLiter(s) Swish and Spit two times a day  buMETAnide Infusion 1 mG/Hr (5 mL/Hr) IV Continuous <Continuous>  calcitriol   Capsule 0.5 MICROGram(s) Oral daily  calcium acetate 2001 milliGRAM(s) Oral three times a day with meals  calcium carbonate 1250 mG  + Vitamin D (OsCal 500 + D) 2 Tablet(s) Oral three times a day  cefpodoxime 200 milliGRAM(s) Oral every 12 hours  gabapentin 200 milliGRAM(s) Oral three times a day  influenza   Vaccine 0.5 milliLiter(s) IntraMuscular once  insulin lispro (HumaLOG) corrective regimen sliding scale   SubCutaneous three times a day before meals  insulin lispro (HumaLOG) corrective regimen sliding scale   SubCutaneous at bedtime  levothyroxine 175 MICROGram(s) Oral daily  magnesium sulfate  IVPB 2 Gram(s) IV Intermittent once  milrinone Infusion 0.375 MICROgram(s)/kG/Min (10.89 mL/Hr) IV Continuous <Continuous>  potassium chloride    Tablet ER 20 milliEquivalent(s) Oral once  rifaximin 550 milliGRAM(s) Oral two times a day  spironolactone 50 milliGRAM(s) Oral two times a day    MEDICATIONS  (PRN):      Allergies    Isordil (Headache)  penicillin (Rash)    Intolerances        Review of Systems:    General:  No wt loss, fevers, chills, night sweats,fatigue,   Eyes:  Good vision, no reported pain  ENT:  No sore throat, pain, runny nose, dysphagia  CV:  No pain, palpitations, hypo/hypertension  Resp:  No dyspnea, cough, tachypnea, wheezing  GI:  No pain, No nausea, No vomiting, No diarrhea, No constipation, No weight loss, No fever, No pruritis, No rectal bleeding, No melena, No dysphagia  :  No pain, bleeding, incontinence, nocturia  Muscle:  No pain, weakness  Neuro:  No weakness, tingling, memory problems  Psych:  No fatigue, insomnia, mood problems, depression  Endocrine:  No polyuria, polydypsia, cold/heat intolerance  Heme:  No petechiae, ecchymosis, easy bruisability  Skin:  No rash, tattoos, scars, edema      Vital Signs Last 24 Hrs  T(C): 37.2 (17 Nov 2018 04:00), Max: 37.2 (17 Nov 2018 04:00)  T(F): 98.9 (17 Nov 2018 04:00), Max: 98.9 (17 Nov 2018 04:00)  HR: 110 (17 Nov 2018 04:00) (104 - 110)  BP: 110/81 (17 Nov 2018 04:00) (94/65 - 110/81)  BP(mean): --  RR: 18 (17 Nov 2018 04:00) (17 - 18)  SpO2: 93% (17 Nov 2018 04:00) (93% - 100%)    PHYSICAL EXAM:    Constitutional: NAD, well-developed  HEENT: EOMI, throat clear  Neck: No LAD, supple  Respiratory: CTA and P  Cardiovascular: S1 and S2, RRR, no M  Gastrointestinal: BS+, soft, NT/ND, neg HSM,  Extremities: No peripheral edema, neg clubing, cyanosis  Vascular: 2+ peripheral pulses  Neurological: A/O x 3, no focal deficits  Psychiatric: Normal mood, normal affect  Skin: No rashes      LABS:                        10.4   8.0   )-----------( 131      ( 17 Nov 2018 07:16 )             32.9     11-17    132<L>  |  86<L>  |  14  ----------------------------<  157<H>  3.5   |  32<H>  |  0.81    Ca    7.5<L>      17 Nov 2018 07:16  Phos  4.0     11-17  Mg     1.6     11-17            RADIOLOGY & ADDITIONAL TESTS:

## 2018-11-17 NOTE — PROGRESS NOTE ADULT - ASSESSMENT
64yo post-menopausal F w/ pmh of CHF, thyroid ca, HTN, DM, daily ASA user,  c/o vaginal bleeding that she noticed this morning. Pt states she woke up feeling fatigued with abdominal pressure and that when she sat down, she noticed that she had bled through her underwear. Pt has not experienced any bleeding episodes like this in the past. she admits to normal urinary and bowel functions and denies any HA, dizziness, SOB, CP, N/V/D, constipation. Pt has no known uterine pathologies and has not seen a GYN in 15-20 years. Currently Pt reports fatigue and daughter states she has been in this state for the past couple weeks since she was discharged from hospital for CHF exacerbation.      Problem/Plan - 1:  ·  Problem: Acute on chronic systolic congestive heart failure.  Plan: Heart Failure team helping . On Bumex and Milrinone drip as well Spironolactone .  S/P PICC Line.     Problem/Plan - 2:  ·  Problem: Acute renal failure.  Plan: Creatinine better.  Renal following .     Problem/Plan - 3:  ·  Problem: Hyperkalemia/ Hypokalemia/ hypomagnesemia  .  Plan: Correcting.      Problem/Plan - 4:  ·  Problem: Hyponatremia.  Plan: Correcting . Management per renal.      Problem/Plan - 5:  ·  Problem:  Hypocalcemia.  Plan: Corrected.  Replacing IV and PO ..      Problem/Plan - 6:  Problem:  Jaundice, hepatocellular. Plan: Likely from CHF . Monitoring LFT .GI following .     Problem/Plan - 7:  ·  Problem: DM (diabetes mellitus).  Plan: SSI and Lantus .Sugars in good control.      Problem/Plan - 8:  ·  Problem: UTI .  Plan: IV /PO Abxs for 5 days .     Problem/Plan - 9:  ·  Problem: Hypothyroidism.  Plan: Synthroid home dose and free T4 pending.       Problem/Plan - 10:  Problem: Vaginal bleeding. Plan; GYn consult consulted and awaiting Endometrial BX.      Problem/Plan - 11:  ·  Problem: Coagulopathy .  Plan: Sec to Liver injury. INR better. Holding DVT prophylaxis as vaginal bleeding.       Problem/Plan - 12:  ·  Problem: Feet pain sec to Diabetic Neuropathy  .  Plan: Resolved . Increased  Neurontin. Normal  B12 and Folate as well TFT.      Problem/Plan - 13:  ·  Problem: Mod to Severe MR and Severe TR .  Plan: Structural Heart team following.     Psychiatry consult noted. Patient lacks capacity to make disposition decisions.

## 2018-11-17 NOTE — PROGRESS NOTE ADULT - ATTENDING COMMENTS
Briefly, 62 year old F h/o HTN, HLD, DM II, thyroid cancer s/p thyroidectomy c/b hypocalcemia, HFrEF (LVEF 10%, LVIDd 6.6 cm; first diagnosed in 2013), s/p CRT-D placement in June 2017 with multiple recurrent admissions for cardiogenic shock requiring inotropes who presented with vaginal bleeding in setting of supratherapeutic INR which was 2/2 congestive hepatopathy. Was found to be likely low output heart failure and started on inotropes with diuretics with improvement. Follows with Dr. Robbins. Switched to oral diuretics with inadequate response and weight actually increased. Started on bumex gtt with good effect however she does not understand the rationale of requiring these medications and appears worse than prior. On exam, JVD approx 18 cm, RRR, grade II/VI systolic murmur, S3, CTAB, nontender abdomen, trace edema, asterixis. Labs reviewed - K 3.1, BUN/Cr 18/0.7, prior TBili 4.3 (downtrending), Ca 8.6. RHC on 5/25/18 which showed RA of 28, PCWP 37, PA 57/39/46 , EDP 23, PA sat 34.7%, CO 2.72, CI 1.21. TTE reviewed - EF 10%, LVEDD 6.6 cm, mod MR, severe TR (malcoaptation). Overall stage D HF, NYHA class IV with plan for home inotropes via PICC as patient adamant about not being evaluated for LVAD currently, however have concerns about her capacity to make decisions  - volume overloaded; give bolus bumex 4 mg IV and then increase to 2 mg/hr  - hypokalemia; agree with increaseing marcel to 50 mg bid   - standing weights daily  - continue milrinone to 0.375 mcg/kg/min  - has asterixis and likely cirrhosis; ammonia wnl; lactulose/rifaximin as above  - advised pt against leaving as she is decompensated; does not have capacity to make decisions

## 2018-11-17 NOTE — PROGRESS NOTE ADULT - SUBJECTIVE AND OBJECTIVE BOX
pt seen and examined, no complaints, ROS - .     aspirin  chewable 81 milliGRAM(s) Oral daily  Biotene Dry Mouth Oral Rinse 5 milliLiter(s) Swish and Spit two times a day  buMETAnide Infusion 1 mG/Hr IV Continuous <Continuous>  calcitriol   Capsule 0.5 MICROGram(s) Oral daily  calcium acetate 2001 milliGRAM(s) Oral three times a day with meals  calcium carbonate 1250 mG  + Vitamin D (OsCal 500 + D) 2 Tablet(s) Oral three times a day  cefpodoxime 200 milliGRAM(s) Oral every 12 hours  gabapentin 200 milliGRAM(s) Oral three times a day  influenza   Vaccine 0.5 milliLiter(s) IntraMuscular once  insulin lispro (HumaLOG) corrective regimen sliding scale   SubCutaneous three times a day before meals  insulin lispro (HumaLOG) corrective regimen sliding scale   SubCutaneous at bedtime  levothyroxine 175 MICROGram(s) Oral daily  milrinone Infusion 0.375 MICROgram(s)/kG/Min IV Continuous <Continuous>  rifaximin 550 milliGRAM(s) Oral two times a day  spironolactone 50 milliGRAM(s) Oral two times a day                            10.0   7.65  )-----------( 125      ( 16 Nov 2018 07:55 )             32.3       Hemoglobin: 10.0 g/dL (11-16 @ 07:55)  Hemoglobin: 10.0 g/dL (11-15 @ 07:53)  Hemoglobin: 10.4 g/dL (11-14 @ 07:43)  Hemoglobin: 10.5 g/dL (11-13 @ 07:59)      11-17    132<L>  |  86<L>  |  14  ----------------------------<  157<H>  3.5   |  32<H>  |  0.81    Ca    7.5<L>      17 Nov 2018 07:16  Phos  4.0     11-17  Mg     1.6     11-17      Creatinine Trend: 0.81<--, 0.80<--, 0.74<--, 0.78<--, 0.74<--, 0.74<--    COAGS:           T(C): 37.2 (11-17-18 @ 04:00), Max: 37.2 (11-17-18 @ 04:00)  HR: 110 (11-17-18 @ 04:00) (104 - 110)  BP: 110/81 (11-17-18 @ 04:00) (94/65 - 110/81)  RR: 18 (11-17-18 @ 04:00) (17 - 18)  SpO2: 93% (11-17-18 @ 04:00) (93% - 100%)  Wt(kg): --    I&O's Summary    16 Nov 2018 07:01  -  17 Nov 2018 07:00  --------------------------------------------------------  IN: 990 mL / OUT: 1400 mL / NET: -410 mL            lymphatic: No lymphadenopathy + edema in LE bilaterally   Cardiovascular: Normal S1 S2,RRR, No murmurs ,   Respiratory: Lungs clear to auscultation, normal effort 	  Gastrointestinal:  Soft, distended   Skin: No rashes, No ecchymoses, No cyanosis,       TELEMETRY: ST, , 15 beats NSVT	    ECG: < from: 12 Lead ECG (11.06.18 @ 19:34) >  Atrial-sensed ventricular-paced rhythm  Biventricular pacemaker detected  ABNORMAL ECG    < end of copied text >    	  RADIOLOGY:   DIAGNOSTIC TESTING:  [ ] Echocardiogram: < from: Transthoracic Echocardiogram (08.23.18 @ 11:25) >  1. Tethered mitral valve leaflets with normal opening.  Moderate-severe mitral regurgitation.  2. Normal left ventricular internal dimensions and wall  thicknesses.  3. Severe global left ventricular systolic dysfunction.  4. Moderate right atrial enlargement.  A device wire is  noted in the right heart.  5. Right ventricular enlargement with decreased right  ventricular systolic function.  6. Normal tricuspid valve.  Severe tricuspid regurgitation.  7. Estimated pulmonary artery systolic pressure equals 24  mm Hg, assuming right atrial pressure equals 10  mm Hg,  consistent with normal pulmonary pressures.  *** Compared with echocardiogram of 6/29/2018, no  significant changes noted.    < end of copied text >    [ ]  Catheterization:  [ ] Stress Test:    OTHER: 	      ASSESSMENT/PLAN: 64 y/o F with PMHx of NICM s/p AICD, HTN, moderate-severe MR, DM, ? medication compliance who was admitted with volume overload and heart failure.    -continue with inotrope ( primacor)  assisted diuresis per heart failure  -placed on Bumex gtt yesterday - now negative 1.7L  -continue with IV diuresis  -follow up EP re: NSVT  -heart failure follow up   -no further interventional workup or ischemic evaluation needed at this time  D/W Dr Muller

## 2018-11-18 LAB
AMYLASE P1 CFR SERPL: 56 U/L — SIGNIFICANT CHANGE UP (ref 25–125)
ANION GAP SERPL CALC-SCNC: 17 MMOL/L — SIGNIFICANT CHANGE UP (ref 5–17)
BUN SERPL-MCNC: 18 MG/DL — SIGNIFICANT CHANGE UP (ref 7–23)
CALCIUM SERPL-MCNC: 7.6 MG/DL — LOW (ref 8.4–10.5)
CHLORIDE SERPL-SCNC: 86 MMOL/L — LOW (ref 96–108)
CO2 SERPL-SCNC: 30 MMOL/L — SIGNIFICANT CHANGE UP (ref 22–31)
CREAT SERPL-MCNC: 0.97 MG/DL — SIGNIFICANT CHANGE UP (ref 0.5–1.3)
GLUCOSE BLDC GLUCOMTR-MCNC: 100 MG/DL — HIGH (ref 70–99)
GLUCOSE BLDC GLUCOMTR-MCNC: 135 MG/DL — HIGH (ref 70–99)
GLUCOSE BLDC GLUCOMTR-MCNC: 164 MG/DL — HIGH (ref 70–99)
GLUCOSE BLDC GLUCOMTR-MCNC: 178 MG/DL — HIGH (ref 70–99)
GLUCOSE SERPL-MCNC: 141 MG/DL — HIGH (ref 70–99)
HCT VFR BLD CALC: 31.5 % — LOW (ref 34.5–45)
HGB BLD-MCNC: 10.2 G/DL — LOW (ref 11.5–15.5)
INR BLD: 2.22 RATIO — HIGH (ref 0.88–1.16)
LIDOCAIN IGE QN: 36 U/L — SIGNIFICANT CHANGE UP (ref 7–60)
MAGNESIUM SERPL-MCNC: 1.8 MG/DL — SIGNIFICANT CHANGE UP (ref 1.6–2.6)
MCHC RBC-ENTMCNC: 23.4 PG — LOW (ref 27–34)
MCHC RBC-ENTMCNC: 32.4 GM/DL — SIGNIFICANT CHANGE UP (ref 32–36)
MCV RBC AUTO: 72.4 FL — LOW (ref 80–100)
PLATELET # BLD AUTO: 169 K/UL — SIGNIFICANT CHANGE UP (ref 150–400)
POTASSIUM SERPL-MCNC: 4.3 MMOL/L — SIGNIFICANT CHANGE UP (ref 3.5–5.3)
POTASSIUM SERPL-SCNC: 4.3 MMOL/L — SIGNIFICANT CHANGE UP (ref 3.5–5.3)
PROTHROM AB SERPL-ACNC: 25.9 SEC — HIGH (ref 10–13.1)
PTH RELATED PROT SERPL-MCNC: <2 PMOL/L — SIGNIFICANT CHANGE UP
RAPID RVP RESULT: SIGNIFICANT CHANGE UP
RBC # BLD: 4.35 M/UL — SIGNIFICANT CHANGE UP (ref 3.8–5.2)
RBC # FLD: 26.1 % — HIGH (ref 10.3–14.5)
SODIUM SERPL-SCNC: 133 MMOL/L — LOW (ref 135–145)
T4 FREE SERPL-MCNC: 1.2 NG/DL — SIGNIFICANT CHANGE UP (ref 0.9–1.8)
WBC # BLD: 7.05 K/UL — SIGNIFICANT CHANGE UP (ref 3.8–10.5)
WBC # FLD AUTO: 7.05 K/UL — SIGNIFICANT CHANGE UP (ref 3.8–10.5)

## 2018-11-18 PROCEDURE — 74018 RADEX ABDOMEN 1 VIEW: CPT | Mod: 26

## 2018-11-18 PROCEDURE — 99233 SBSQ HOSP IP/OBS HIGH 50: CPT | Mod: GC

## 2018-11-18 RX ORDER — MAGNESIUM SULFATE 500 MG/ML
1 VIAL (ML) INJECTION ONCE
Qty: 0 | Refills: 0 | Status: COMPLETED | OUTPATIENT
Start: 2018-11-18 | End: 2018-11-18

## 2018-11-18 RX ORDER — IBUPROFEN 200 MG
400 TABLET ORAL ONCE
Qty: 0 | Refills: 0 | Status: DISCONTINUED | OUTPATIENT
Start: 2018-11-18 | End: 2018-11-18

## 2018-11-18 RX ORDER — BENZOCAINE AND MENTHOL 5; 1 G/100ML; G/100ML
1 LIQUID ORAL ONCE
Qty: 0 | Refills: 0 | Status: COMPLETED | OUTPATIENT
Start: 2018-11-18 | End: 2018-11-18

## 2018-11-18 RX ORDER — ACETAMINOPHEN 500 MG
975 TABLET ORAL EVERY 4 HOURS
Qty: 0 | Refills: 0 | Status: DISCONTINUED | OUTPATIENT
Start: 2018-11-18 | End: 2018-11-23

## 2018-11-18 RX ORDER — OXYCODONE HYDROCHLORIDE 5 MG/1
5 TABLET ORAL ONCE
Qty: 0 | Refills: 0 | Status: DISCONTINUED | OUTPATIENT
Start: 2018-11-18 | End: 2018-11-18

## 2018-11-18 RX ORDER — POTASSIUM CHLORIDE 20 MEQ
10 PACKET (EA) ORAL
Qty: 0 | Refills: 0 | Status: COMPLETED | OUTPATIENT
Start: 2018-11-18 | End: 2018-11-18

## 2018-11-18 RX ADMIN — Medication 100 MILLIGRAM(S): at 00:41

## 2018-11-18 RX ADMIN — Medication 100 MILLIEQUIVALENT(S): at 03:18

## 2018-11-18 RX ADMIN — OXYCODONE HYDROCHLORIDE 5 MILLIGRAM(S): 5 TABLET ORAL at 05:35

## 2018-11-18 RX ADMIN — Medication 975 MILLIGRAM(S): at 19:15

## 2018-11-18 RX ADMIN — Medication 2 TABLET(S): at 15:08

## 2018-11-18 RX ADMIN — Medication 5 MILLILITER(S): at 20:11

## 2018-11-18 RX ADMIN — BUMETANIDE 10 MG/HR: 0.25 INJECTION INTRAMUSCULAR; INTRAVENOUS at 08:07

## 2018-11-18 RX ADMIN — Medication 100 MILLIEQUIVALENT(S): at 04:24

## 2018-11-18 RX ADMIN — GABAPENTIN 200 MILLIGRAM(S): 400 CAPSULE ORAL at 22:36

## 2018-11-18 RX ADMIN — Medication 2001 MILLIGRAM(S): at 17:16

## 2018-11-18 RX ADMIN — Medication 1: at 17:20

## 2018-11-18 RX ADMIN — GABAPENTIN 200 MILLIGRAM(S): 400 CAPSULE ORAL at 09:46

## 2018-11-18 RX ADMIN — SPIRONOLACTONE 50 MILLIGRAM(S): 25 TABLET, FILM COATED ORAL at 22:36

## 2018-11-18 RX ADMIN — Medication 2 TABLET(S): at 22:36

## 2018-11-18 RX ADMIN — OXYCODONE HYDROCHLORIDE 5 MILLIGRAM(S): 5 TABLET ORAL at 05:06

## 2018-11-18 RX ADMIN — MILRINONE LACTATE 10.89 MICROGRAM(S)/KG/MIN: 1 INJECTION, SOLUTION INTRAVENOUS at 15:07

## 2018-11-18 RX ADMIN — BENZOCAINE AND MENTHOL 1 LOZENGE: 5; 1 LIQUID ORAL at 23:00

## 2018-11-18 RX ADMIN — SPIRONOLACTONE 50 MILLIGRAM(S): 25 TABLET, FILM COATED ORAL at 09:48

## 2018-11-18 RX ADMIN — Medication 175 MICROGRAM(S): at 09:47

## 2018-11-18 RX ADMIN — Medication 100 GRAM(S): at 11:23

## 2018-11-18 RX ADMIN — Medication 2 TABLET(S): at 09:46

## 2018-11-18 RX ADMIN — Medication 975 MILLIGRAM(S): at 17:19

## 2018-11-18 RX ADMIN — CALCITRIOL 0.5 MICROGRAM(S): 0.5 CAPSULE ORAL at 11:24

## 2018-11-18 RX ADMIN — Medication 81 MILLIGRAM(S): at 11:24

## 2018-11-18 RX ADMIN — Medication 2001 MILLIGRAM(S): at 09:49

## 2018-11-18 RX ADMIN — Medication 5 MILLILITER(S): at 09:49

## 2018-11-18 RX ADMIN — GABAPENTIN 200 MILLIGRAM(S): 400 CAPSULE ORAL at 15:08

## 2018-11-18 NOTE — PROGRESS NOTE ADULT - ASSESSMENT
64yo post-menopausal F w/ pmh of CHF, thyroid ca, HTN, DM, daily ASA user,  c/o vaginal bleeding that she noticed this morning. Pt states she woke up feeling fatigued with abdominal pressure and that when she sat down, she noticed that she had bled through her underwear. Pt has not experienced any bleeding episodes like this in the past. she admits to normal urinary and bowel functions and denies any HA, dizziness, SOB, CP, N/V/D, constipation. Pt has no known uterine pathologies and has not seen a GYN in 15-20 years. Currently Pt reports fatigue and daughter states she has been in this state for the past couple weeks since she was discharged from hospital for CHF exacerbation.      Problem/Plan - 1:  ·  Problem: Acute on chronic systolic congestive heart failure.  Plan: Heart Failure team helping . On Bumex and Milrinone drip as well Spironolactone .  S/P PICC Line.     Problem/Plan - 2:  ·  Problem: Acute renal failure.  Plan: Creatinine better.  Renal following .     Problem/Plan - 3:  ·  Problem: Hyperkalemia/ Hypokalemia/ hypomagnesemia  .  Plan: Correcting.      Problem/Plan - 4:  ·  Problem: Hyponatremia.  Plan: Correcting . Management per renal.      Problem/Plan - 5:  ·  Problem:  Hypocalcemia.  Plan: Corrected.  Replacing IV and PO ..      Problem/Plan - 6:  Problem:  Jaundice, hepatocellular. Plan: Likely from CHF . Monitoring LFT .GI following .     Problem/Plan - 7:  ·  Problem: DM (diabetes mellitus).  Plan: SSI and Lantus .Sugars in good control.      Problem/Plan - 8:  ·  Problem: UTI .  Plan: S/P Abxs for 5 days .     Problem/Plan - 9:  ·  Problem: Hypothyroidism.  Plan: Synthroid home dose and free T4 pending.       Problem/Plan - 10:  Problem: Vaginal bleeding. Plan; GYn follow up pending and awaiting Endometrial BX.      Problem/Plan - 11:  ·  Problem: Coagulopathy .  Plan: Sec to Liver injury. INR up. Holding DVT prophylaxis as vaginal bleeding.       Problem/Plan - 12:  ·  Problem: Feet pain sec to Diabetic Neuropathy  .  Plan: Resolved . Increased  Neurontin. Normal  B12 and Folate as well TFT.      Problem/Plan - 13:  ·  Problem: Mod to Severe MR and Severe TR .  Plan: Structural Heart team following.     Psychiatry consult noted. Patient lacks capacity to make disposition decisions.

## 2018-11-18 NOTE — PROGRESS NOTE ADULT - ATTENDING COMMENTS
Agree with above.   -no ischemic evaluation needed at this time  -diuresis per heart failure and cardiology    Kunal Muller MD

## 2018-11-18 NOTE — PROGRESS NOTE ADULT - SUBJECTIVE AND OBJECTIVE BOX
S: No CP or SOB.  ROS otherwise negative    MEDICATIONS  (STANDING):  aspirin  chewable 81 milliGRAM(s) Oral daily  Biotene Dry Mouth Oral Rinse 5 milliLiter(s) Swish and Spit two times a day  buMETAnide Infusion 2 mG/Hr (10 mL/Hr) IV Continuous <Continuous>  calcitriol   Capsule 0.5 MICROGram(s) Oral daily  calcium acetate 2001 milliGRAM(s) Oral three times a day with meals  calcium carbonate 1250 mG  + Vitamin D (OsCal 500 + D) 2 Tablet(s) Oral three times a day  gabapentin 200 milliGRAM(s) Oral three times a day  influenza   Vaccine 0.5 milliLiter(s) IntraMuscular once  insulin lispro (HumaLOG) corrective regimen sliding scale   SubCutaneous three times a day before meals  insulin lispro (HumaLOG) corrective regimen sliding scale   SubCutaneous at bedtime  levothyroxine 175 MICROGram(s) Oral daily  milrinone Infusion 0.375 MICROgram(s)/kG/Min (10.89 mL/Hr) IV Continuous <Continuous>  rifaximin 550 milliGRAM(s) Oral two times a day  spironolactone 50 milliGRAM(s) Oral two times a day    LABS:                        10.2   7.05  )-----------( 169      ( 18 Nov 2018 08:11 )             31.5     133<L>  |  86<L>  |  18  ----------------------------<  141<H>  4.3   |  30  |  0.97    Ca    7.6<L>      18 Nov 2018 14:48  Phos  4.0     11-17  Mg     1.8     11-18    Creatinine Trend: 0.97<--, 0.81<--, 0.81<--, 0.80<--, 0.74<--, 0.78<--   PT/INR - ( 18 Nov 2018 08:15 )   PT: 25.9 sec;   INR: 2.22 ratio      PHYSICAL EXAM  Vital Signs Last 24 Hrs  T(C): 36.5 (18 Nov 2018 14:41), Max: 37.2 (18 Nov 2018 04:28)  T(F): 97.7 (18 Nov 2018 14:41), Max: 98.9 (18 Nov 2018 04:28)  HR: 113 (18 Nov 2018 09:40) (110 - 114)  BP: 105/74 (18 Nov 2018 14:41) (94/58 - 114/82)  RR: 18 (18 Nov 2018 14:41) (16 - 19)  SpO2: 98% (18 Nov 2018 14:41) (95% - 98%)      Lymphatic: No lymphadenopathy + edema in LE bilaterally   Cardiovascular: Normal S1 S2,RRR, No murmur   Respiratory: Lungs clear to auscultation, normal effort 	  Gastrointestinal:  Soft, distended   Skin: No rashes, No ecchymoses, No cyanosis,     DATA:    TELEMETRY: 	      	    < from: TTE with Doppler (w/Cont) (11.07.18 @ 05:53) >  Conclusions:  1. Tethered mitral valve leaflets with normal opening.  Mitral annular calcification and thickened  mitral leaflet  tips Moderate mitral regurgitation.  2. Calcified trileaflet aortic valve with normal opening.  3. Moderately dilated left atrium.  LA volume index = 43  cc/m2.  4. Eccentric left ventricular hypertrophy (dilated left  ventricle with normal relative wall thickness).  5. Severe global left ventricular systolic dysfunction.  Endocardial visualization enhanced with intravenous  injection of Ultrasonic Enhancing Agent (Definity). No LV  thrombus.  Septal flattening consistent with right  ventricular overload.  6. Severe diastolic dysfunction with elevated filling  pressures  7. Severe right atrial enlargement.  8. Right ventricular enlargement with decreased right  ventricular systolic function.  A device wire is noted in  the right heart.  9. Dilated tricuspid annulus with malcoaptation of  tricuspid leaflets. Severe tricuspid regurgitation.  *** No previous Echo exam.  ------------------------------------------------------------------------  Confirmed on  11/7/2018 - 16:40:23 by Haley Koch M.D.    < end of copied text >      < from: Cardiac Cath Lab - Adult (05.25.18 @ 11:46) >  DIAGNOSTIC RECOMMENDATIONS: Right heart catheterization demonstrates  elevated PCWP and low cardiac output. Will transfer patient to the CCU for  inotrope assisted diuresis.  Prepared and signed by  Kunal Muller M.D.  Signed 05/29/2018 16:07:27    < end of copied text >      ASSESSMENT/PLAN: 62 y/o F with PMHx of NICM s/p AICD, HTN, moderate-severe MR, reportedly recent LHC at [place] was negative, thyroid cancer s/p thyroidectomy, DM,  admitted with decompensated acute on chronic systolic heart failure.    -continue with Primacor and Bumex gtt per heart failure. Maintain o>I  -daily labs  -no further ischemic evaluation needed at this time  -follow up GYN S: No CP or SOB.  ROS otherwise negative    MEDICATIONS  (STANDING):  aspirin  chewable 81 milliGRAM(s) Oral daily  Biotene Dry Mouth Oral Rinse 5 milliLiter(s) Swish and Spit two times a day  buMETAnide Infusion 2 mG/Hr (10 mL/Hr) IV Continuous <Continuous>  calcitriol   Capsule 0.5 MICROGram(s) Oral daily  calcium acetate 2001 milliGRAM(s) Oral three times a day with meals  calcium carbonate 1250 mG  + Vitamin D (OsCal 500 + D) 2 Tablet(s) Oral three times a day  gabapentin 200 milliGRAM(s) Oral three times a day  influenza   Vaccine 0.5 milliLiter(s) IntraMuscular once  insulin lispro (HumaLOG) corrective regimen sliding scale   SubCutaneous three times a day before meals  insulin lispro (HumaLOG) corrective regimen sliding scale   SubCutaneous at bedtime  levothyroxine 175 MICROGram(s) Oral daily  milrinone Infusion 0.375 MICROgram(s)/kG/Min (10.89 mL/Hr) IV Continuous <Continuous>  rifaximin 550 milliGRAM(s) Oral two times a day  spironolactone 50 milliGRAM(s) Oral two times a day    LABS:                        10.2   7.05  )-----------( 169      ( 18 Nov 2018 08:11 )             31.5     133<L>  |  86<L>  |  18  ----------------------------<  141<H>  4.3   |  30  |  0.97    Ca    7.6<L>      18 Nov 2018 14:48  Phos  4.0     11-17  Mg     1.8     11-18    Creatinine Trend: 0.97<--, 0.81<--, 0.81<--, 0.80<--, 0.74<--, 0.78<--   PT/INR - ( 18 Nov 2018 08:15 )   PT: 25.9 sec;   INR: 2.22 ratio      PHYSICAL EXAM  Vital Signs Last 24 Hrs  T(C): 36.5 (18 Nov 2018 14:41), Max: 37.2 (18 Nov 2018 04:28)  T(F): 97.7 (18 Nov 2018 14:41), Max: 98.9 (18 Nov 2018 04:28)  HR: 113 (18 Nov 2018 09:40) (110 - 114)  BP: 105/74 (18 Nov 2018 14:41) (94/58 - 114/82)  RR: 18 (18 Nov 2018 14:41) (16 - 19)  SpO2: 98% (18 Nov 2018 14:41) (95% - 98%)      Lymphatic: No lymphadenopathy + edema in LE bilaterally   Cardiovascular: Normal S1 S2,RRR, No murmur   Respiratory: Lungs clear to auscultation, normal effort 	  Gastrointestinal:  Soft, distended   Skin: No rashes, No ecchymoses, No cyanosis,     DATA:    TELEMETRY: 	      	    < from: TTE with Doppler (w/Cont) (11.07.18 @ 05:53) >  Conclusions:  1. Tethered mitral valve leaflets with normal opening.  Mitral annular calcification and thickened  mitral leaflet  tips Moderate mitral regurgitation.  2. Calcified trileaflet aortic valve with normal opening.  3. Moderately dilated left atrium.  LA volume index = 43  cc/m2.  4. Eccentric left ventricular hypertrophy (dilated left  ventricle with normal relative wall thickness).  5. Severe global left ventricular systolic dysfunction.  Endocardial visualization enhanced with intravenous  injection of Ultrasonic Enhancing Agent (Definity). No LV  thrombus.  Septal flattening consistent with right  ventricular overload.  6. Severe diastolic dysfunction with elevated filling  pressures  7. Severe right atrial enlargement.  8. Right ventricular enlargement with decreased right  ventricular systolic function.  A device wire is noted in  the right heart.  9. Dilated tricuspid annulus with malcoaptation of  tricuspid leaflets. Severe tricuspid regurgitation.  *** No previous Echo exam.  ------------------------------------------------------------------------  Confirmed on  11/7/2018 - 16:40:23 by Haley Koch M.D.    < end of copied text >      < from: Cardiac Cath Lab - Adult (05.25.18 @ 11:46) >  DIAGNOSTIC RECOMMENDATIONS: Right heart catheterization demonstrates  elevated PCWP and low cardiac output. Will transfer patient to the CCU for  inotrope assisted diuresis.  Prepared and signed by  Kunal Muller M.D.  Signed 05/29/2018 16:07:27    < end of copied text >      ASSESSMENT/PLAN: 64 y/o F with PMHx of NICM s/p AICD, HTN, moderate-severe MR, reportedly recent LHC at [place] was negative, thyroid cancer s/p thyroidectomy, DM,  admitted with decompensated acute on chronic systolic heart failure.    -continue with Primacor and Bumex gtt . Maintain o>I  -daily labs  -no further ischemic evaluation needed at this time  -follow up GYN

## 2018-11-18 NOTE — PROGRESS NOTE ADULT - SUBJECTIVE AND OBJECTIVE BOX
INTERVAL HPI/OVERNIGHT EVENTS: I do not feel good . My body hurts and having vaginal bleeding.   Vital Signs Last 24 Hrs  T(C): 36.8 (18 Nov 2018 09:40), Max: 37.2 (18 Nov 2018 04:28)  T(F): 98.3 (18 Nov 2018 09:40), Max: 98.9 (18 Nov 2018 04:28)  HR: 113 (18 Nov 2018 09:40) (110 - 114)  BP: 114/81 (18 Nov 2018 09:40) (94/58 - 114/82)  BP(mean): --  RR: 19 (18 Nov 2018 09:40) (16 - 19)  SpO2: 95% (18 Nov 2018 09:40) (95% - 97%)  I&O's Summary    17 Nov 2018 07:01  -  18 Nov 2018 07:00  --------------------------------------------------------  IN: 220 mL / OUT: 1400 mL / NET: -1180 mL      MEDICATIONS  (STANDING):  aspirin  chewable 81 milliGRAM(s) Oral daily  Biotene Dry Mouth Oral Rinse 5 milliLiter(s) Swish and Spit two times a day  buMETAnide Infusion 2 mG/Hr (10 mL/Hr) IV Continuous <Continuous>  calcitriol   Capsule 0.5 MICROGram(s) Oral daily  calcium acetate 2001 milliGRAM(s) Oral three times a day with meals  calcium carbonate 1250 mG  + Vitamin D (OsCal 500 + D) 2 Tablet(s) Oral three times a day  gabapentin 200 milliGRAM(s) Oral three times a day  influenza   Vaccine 0.5 milliLiter(s) IntraMuscular once  insulin lispro (HumaLOG) corrective regimen sliding scale   SubCutaneous three times a day before meals  insulin lispro (HumaLOG) corrective regimen sliding scale   SubCutaneous at bedtime  levothyroxine 175 MICROGram(s) Oral daily  magnesium sulfate  IVPB 1 Gram(s) IV Intermittent once  metolazone 5 milliGRAM(s) Oral once  milrinone Infusion 0.375 MICROgram(s)/kG/Min (10.89 mL/Hr) IV Continuous <Continuous>  rifaximin 550 milliGRAM(s) Oral two times a day  spironolactone 50 milliGRAM(s) Oral two times a day    MEDICATIONS  (PRN):  guaiFENesin   Syrup  (Sugar-Free) 100 milliGRAM(s) Oral every 6 hours PRN Cough    LABS:                        10.2   7.05  )-----------( 169      ( 18 Nov 2018 08:11 )             31.5     11-17    132<L>  |  84<L>  |  15  ----------------------------<  154<H>  3.8   |  31  |  0.81    Ca    7.7<L>      17 Nov 2018 18:52  Phos  4.0     11-17  Mg     1.8     11-18      PT/INR - ( 18 Nov 2018 08:15 )   PT: 25.9 sec;   INR: 2.22 ratio             CAPILLARY BLOOD GLUCOSE      POCT Blood Glucose.: 100 mg/dL (18 Nov 2018 08:03)  POCT Blood Glucose.: 147 mg/dL (17 Nov 2018 21:40)  POCT Blood Glucose.: 153 mg/dL (17 Nov 2018 16:35)  POCT Blood Glucose.: 160 mg/dL (17 Nov 2018 11:59)          REVIEW OF SYSTEMS:  CONSTITUTIONAL: No fever, weight loss, or fatigue  EYES: No eye pain, visual disturbances, or discharge  ENMT:  No difficulty hearing, tinnitus, vertigo; No sinus or throat pain  RESPIRATORY: shortness of breath  CARDIOVASCULAR: No chest pain, palpitations, dizziness, or leg swelling  GASTROINTESTINAL: No abdominal or epigastric pain. No nausea, vomiting, or hematemesis; No diarrhea or constipation. No melena or hematochezia.  GENITOURINARY: No dysuria, frequency, hematuria, or incontinence  NEUROLOGICAL: numbness feet     Consultant(s) Notes Reviewed:  [x ] YES  [ ] NO    PHYSICAL EXAM:  GENERAL: NAD, well-groomed, well-developed, not in any distress ,  HEAD:  Atraumatic, Normocephalic  EYES: EOMI, PERRLA, conjunctiva and sclera clear  NECK: Supple, No JVD, Normal thyroid  NERVOUS SYSTEM:  Alert & Oriented X3, No focal deficit   CHEST/LUNG: Good air entry bilateral with no  rales, rhonchi, wheezing, or rubs  HEART: Regular rate and rhythm; No murmurs, rubs, or gallops  ABDOMEN: Soft, Nontender, Nondistended; Bowel sounds present  EXTREMITIES:  2+  edema      Care Discussed with Consultants/Other Providers [ x] YES  [ ] NO

## 2018-11-18 NOTE — PROGRESS NOTE ADULT - SUBJECTIVE AND OBJECTIVE BOX
Interval Events: Pt seen and examined. She complains of lower crampy abdominal pain and vaginal bleeding.  No n/v.   Abd xray reviewed, non- obstructive bowel gas pattern.     MEDICATIONS  (STANDING):  aspirin  chewable 81 milliGRAM(s) Oral daily  Biotene Dry Mouth Oral Rinse 5 milliLiter(s) Swish and Spit two times a day  buMETAnide Infusion 2 mG/Hr (10 mL/Hr) IV Continuous <Continuous>  calcitriol   Capsule 0.5 MICROGram(s) Oral daily  calcium acetate 2001 milliGRAM(s) Oral three times a day with meals  calcium carbonate 1250 mG  + Vitamin D (OsCal 500 + D) 2 Tablet(s) Oral three times a day  gabapentin 200 milliGRAM(s) Oral three times a day  influenza   Vaccine 0.5 milliLiter(s) IntraMuscular once  insulin lispro (HumaLOG) corrective regimen sliding scale   SubCutaneous three times a day before meals  insulin lispro (HumaLOG) corrective regimen sliding scale   SubCutaneous at bedtime  levothyroxine 175 MICROGram(s) Oral daily  magnesium sulfate  IVPB 1 Gram(s) IV Intermittent once  metolazone 5 milliGRAM(s) Oral once  milrinone Infusion 0.375 MICROgram(s)/kG/Min (10.89 mL/Hr) IV Continuous <Continuous>  rifaximin 550 milliGRAM(s) Oral two times a day  spironolactone 50 milliGRAM(s) Oral two times a day    MEDICATIONS  (PRN):  guaiFENesin   Syrup  (Sugar-Free) 100 milliGRAM(s) Oral every 6 hours PRN Cough      Allergies    Isordil (Headache)  penicillin (Rash)    Intolerances        Review of Systems:    General:  No wt loss, fevers, chills, night sweats,fatigue,   Eyes:  Good vision, no reported pain  ENT:  No sore throat, pain, runny nose, dysphagia  CV:  No pain, palpitations, hypo/hypertension  Resp:  No dyspnea, cough, tachypnea, wheezing  GI:  No pain, No nausea, No vomiting, No diarrhea, No constipation, No weight loss, No fever, No pruritis, No rectal bleeding, No melena, No dysphagia  :  No pain, bleeding, incontinence, nocturia  Muscle:  No pain, weakness  Neuro:  No weakness, tingling, memory problems  Psych:  No fatigue, insomnia, mood problems, depression  Endocrine:  No polyuria, polydypsia, cold/heat intolerance  Heme:  No petechiae, ecchymosis, easy bruisability  Skin:  No rash, tattoos, scars, edema      Vital Signs Last 24 Hrs  T(C): 36.8 (18 Nov 2018 09:40), Max: 37.2 (18 Nov 2018 04:28)  T(F): 98.3 (18 Nov 2018 09:40), Max: 98.9 (18 Nov 2018 04:28)  HR: 113 (18 Nov 2018 09:40) (110 - 114)  BP: 114/81 (18 Nov 2018 09:40) (94/58 - 114/82)  BP(mean): --  RR: 19 (18 Nov 2018 09:40) (16 - 19)  SpO2: 95% (18 Nov 2018 09:40) (95% - 97%)    PHYSICAL EXAM:    Constitutional: NAD, well-developed  HEENT: EOMI, throat clear  Neck: No LAD, supple  Respiratory: CTA and P  Cardiovascular: S1 and S2, RRR, no M  Gastrointestinal: BS+, soft, NT/ND, neg HSM,  Extremities: No peripheral edema, neg clubing, cyanosis  Vascular: 2+ peripheral pulses  Neurological: A/O x 3, no focal deficits  Psychiatric: Normal mood, normal affect  Skin: No rashes      LABS:                        10.2   7.05  )-----------( 169      ( 18 Nov 2018 08:11 )             31.5     11-17    132<L>  |  84<L>  |  15  ----------------------------<  154<H>  3.8   |  31  |  0.81    Ca    7.7<L>      17 Nov 2018 18:52  Phos  4.0     11-17  Mg     1.8     11-18      PT/INR - ( 18 Nov 2018 08:15 )   PT: 25.9 sec;   INR: 2.22 ratio               RADIOLOGY & ADDITIONAL TESTS:

## 2018-11-18 NOTE — PROGRESS NOTE ADULT - SUBJECTIVE AND OBJECTIVE BOX
pt seen and examined, no complaints, ROS - .     aspirin  chewable 81 milliGRAM(s) Oral daily  Biotene Dry Mouth Oral Rinse 5 milliLiter(s) Swish and Spit two times a day  buMETAnide Infusion 1 mG/Hr IV Continuous <Continuous>  calcitriol   Capsule 0.5 MICROGram(s) Oral daily  calcium acetate 2001 milliGRAM(s) Oral three times a day with meals  calcium carbonate 1250 mG  + Vitamin D (OsCal 500 + D) 2 Tablet(s) Oral three times a day  cefpodoxime 200 milliGRAM(s) Oral every 12 hours  gabapentin 200 milliGRAM(s) Oral three times a day  influenza   Vaccine 0.5 milliLiter(s) IntraMuscular once  insulin lispro (HumaLOG) corrective regimen sliding scale   SubCutaneous three times a day before meals  insulin lispro (HumaLOG) corrective regimen sliding scale   SubCutaneous at bedtime  levothyroxine 175 MICROGram(s) Oral daily  milrinone Infusion 0.375 MICROgram(s)/kG/Min IV Continuous <Continuous>  rifaximin 550 milliGRAM(s) Oral two times a day  spironolactone 50 milliGRAM(s) Oral two times a day                            10.0   7.65  )-----------( 125      ( 16 Nov 2018 07:55 )             32.3       Hemoglobin: 10.0 g/dL (11-16 @ 07:55)  Hemoglobin: 10.0 g/dL (11-15 @ 07:53)  Hemoglobin: 10.4 g/dL (11-14 @ 07:43)  Hemoglobin: 10.5 g/dL (11-13 @ 07:59)      11-17    132<L>  |  86<L>  |  14  ----------------------------<  157<H>  3.5   |  32<H>  |  0.81    Ca    7.5<L>      17 Nov 2018 07:16  Phos  4.0     11-17  Mg     1.6     11-17      Creatinine Trend: 0.81<--, 0.80<--, 0.74<--, 0.78<--, 0.74<--, 0.74<--    COAGS:           T(C): 37.2 (11-17-18 @ 04:00), Max: 37.2 (11-17-18 @ 04:00)  HR: 110 (11-17-18 @ 04:00) (104 - 110)  BP: 110/81 (11-17-18 @ 04:00) (94/65 - 110/81)  RR: 18 (11-17-18 @ 04:00) (17 - 18)  SpO2: 93% (11-17-18 @ 04:00) (93% - 100%)  Wt(kg): --    I&O's Summary    16 Nov 2018 07:01  -  17 Nov 2018 07:00  --------------------------------------------------------  IN: 990 mL / OUT: 1400 mL / NET: -410 mL            lymphatic: No lymphadenopathy + edema in LE bilaterally   Cardiovascular: Normal S1 S2,RRR, No murmurs ,   Respiratory: Lungs clear to auscultation, normal effort 	  Gastrointestinal:  Soft, distended   Skin: No rashes, No ecchymoses, No cyanosis,       TELEMETRY: ST, , 15 beats NSVT	    ECG: < from: 12 Lead ECG (11.06.18 @ 19:34) >  Atrial-sensed ventricular-paced rhythm  Biventricular pacemaker detected  ABNORMAL ECG    < end of copied text >    	  RADIOLOGY:   DIAGNOSTIC TESTING:  [ ] Echocardiogram: < from: Transthoracic Echocardiogram (08.23.18 @ 11:25) >  1. Tethered mitral valve leaflets with normal opening.  Moderate-severe mitral regurgitation.  2. Normal left ventricular internal dimensions and wall  thicknesses.  3. Severe global left ventricular systolic dysfunction.  4. Moderate right atrial enlargement.  A device wire is  noted in the right heart.  5. Right ventricular enlargement with decreased right  ventricular systolic function.  6. Normal tricuspid valve.  Severe tricuspid regurgitation.  7. Estimated pulmonary artery systolic pressure equals 24  mm Hg, assuming right atrial pressure equals 10  mm Hg,  consistent with normal pulmonary pressures.  *** Compared with echocardiogram of 6/29/2018, no  significant changes noted.    < end of copied text >    [ ]  Catheterization:  [ ] Stress Test:    OTHER: 	      ASSESSMENT/PLAN: 62 y/o F with PMHx of NICM s/p AICD, HTN, moderate-severe MR, DM, ? medication compliance who was admitted with volume overload and heart failure.    -continue with inotrope ( primacor)  assisted diuresis per heart failure  -placed on Bumex gtt yesterday - now negative 1.7L  -continue with IV diuresis  -follow up EP re: NSVT  -heart failure follow up   -no further interventional workup or ischemic evaluation needed at this time  D/W Dr Muller

## 2018-11-18 NOTE — PROGRESS NOTE ADULT - SUBJECTIVE AND OBJECTIVE BOX
Subjective: Pt c/o recurrent vaginal bleeding, and generalized malaise and pain.     Medications:  aspirin  chewable 81 milliGRAM(s) Oral daily  Biotene Dry Mouth Oral Rinse 5 milliLiter(s) Swish and Spit two times a day  buMETAnide Infusion 2 mG/Hr IV Continuous <Continuous>  calcitriol   Capsule 0.5 MICROGram(s) Oral daily  calcium acetate 2001 milliGRAM(s) Oral three times a day with meals  calcium carbonate 1250 mG  + Vitamin D (OsCal 500 + D) 2 Tablet(s) Oral three times a day  gabapentin 200 milliGRAM(s) Oral three times a day  guaiFENesin   Syrup  (Sugar-Free) 100 milliGRAM(s) Oral every 6 hours PRN  influenza   Vaccine 0.5 milliLiter(s) IntraMuscular once  insulin lispro (HumaLOG) corrective regimen sliding scale   SubCutaneous three times a day before meals  insulin lispro (HumaLOG) corrective regimen sliding scale   SubCutaneous at bedtime  levothyroxine 175 MICROGram(s) Oral daily  milrinone Infusion 0.375 MICROgram(s)/kG/Min IV Continuous <Continuous>  rifaximin 550 milliGRAM(s) Oral two times a day  spironolactone 50 milliGRAM(s) Oral two times a day    Vitals:  T(C): 36.8 (18 @ 09:40), Max: 37.2 (18 @ 04:28)  HR: 113 (18 @ 09:40) (110 - 114)  BP: 114/81 (18 @ 09:40) (94/58 - 114/82)  RR: 19 (18 @ 09:40) (16 - 19)  SpO2: 95% (18 @ 09:40) (95% - 97%)     Daily Weight in k (2018 08:03)        I&O's Summary    2018 07:01  -  2018 07:00  --------------------------------------------------------  IN: 220 mL / OUT: 1400 mL / NET: -1180 mL        Physical Exam:  Appearance: Lethargic  HEENT: JVP moderately elevated   Cardiovascular: RRR, Normal S1 S2  Respiratory: Clear to auscultation bilaterally  Gastrointestinal: Soft, Non-tender, non-distended	  Skin: no skin lesions  Neurologic: Non-focal  Extremities: No LE edema, warm and well perfused  Psychiatry: A & O x 3, Mood & affect appropriate      Labs:                        10.2   7.05  )-----------( 169      ( 2018 08:11 )             31.5     11-17    132<L>  |  84<L>  |  15  ----------------------------<  154<H>  3.8   |  31  |  0.81    Ca    7.7<L>      2018 18:52  Phos  4.0     -  Mg     1.8             PT/INR - ( 2018 08:15 )   PT: 25.9 sec;   INR: 2.22 ratio

## 2018-11-18 NOTE — PROGRESS NOTE ADULT - ATTENDING COMMENTS
Briefly, 62 year old F h/o HTN, HLD, DM II, thyroid cancer s/p thyroidectomy c/b hypocalcemia, HFrEF (LVEF 10%, LVIDd 6.6 cm; first diagnosed in 2013), s/p CRT-D placement in June 2017 with multiple recurrent admissions for cardiogenic shock requiring inotropes who presented with vaginal bleeding in setting of supratherapeutic INR which was 2/2 congestive hepatopathy. Was found to be likely low output heart failure and started on inotropes with diuretics with improvement. Follows with Dr. Robbins. Switched to oral diuretics with inadequate response and weight actually increased. Started on bumex gtt with good effect however she does not understand the rationale of requiring these medications and appears worse than prior. Improving somewhat to bumex gtt. Had vaginal spotting. On exam, JVD approx 18 cm, RRR, grade II/VI systolic murmur, S3, CTAB, nontender abdomen, trace edema, asterixis. Labs reviewed - K 3.1, BUN/Cr 18/0.7, prior TBili 4.3 (downtrending), Ca 8.6. RHC on 5/25/18 which showed RA of 28, PCWP 37, PA 57/39/46 , EDP 23, PA sat 34.7%, CO 2.72, CI 1.21. TTE reviewed - EF 10%, LVEDD 6.6 cm, mod MR, severe TR (malcoaptation). Overall stage D HF, NYHA class IV with plan for home inotropes via PICC as patient adamant about not being evaluated for LVAD currently, however have concerns about her capacity to make decisions  - volume overloaded; continue bumex gtt at 2mg/hr; give metolazone 5 mg x1 today  - hypokalemia; on marcel to 50 mg bid   - standing weights daily  - continue milrinone to 0.375 mcg/kg/min  - has asterixis and likely cirrhosis; ammonia wnl; lactulose/rifaximin as above  - advised pt against leaving as she is decompensated; does not have capacity to make decisions; will need to discuss with family GOC as patient is likely not adequately supported with current dose of milrinone  - would benefit from repeat RHC

## 2018-11-18 NOTE — PROGRESS NOTE ADULT - SUBJECTIVE AND OBJECTIVE BOX
EP ATTENDING    tele: NSR, biv pacing    no palpitations, no syncope, no angina    remains on bumex and milrinone drips    aspirin  chewable 81 milliGRAM(s) Oral daily  Biotene Dry Mouth Oral Rinse 5 milliLiter(s) Swish and Spit two times a day  buMETAnide Infusion 2 mG/Hr IV Continuous <Continuous>  calcitriol   Capsule 0.5 MICROGram(s) Oral daily  calcium acetate 2001 milliGRAM(s) Oral three times a day with meals  calcium carbonate 1250 mG  + Vitamin D (OsCal 500 + D) 2 Tablet(s) Oral three times a day  gabapentin 200 milliGRAM(s) Oral three times a day  guaiFENesin   Syrup  (Sugar-Free) 100 milliGRAM(s) Oral every 6 hours PRN  influenza   Vaccine 0.5 milliLiter(s) IntraMuscular once  insulin lispro (HumaLOG) corrective regimen sliding scale   SubCutaneous three times a day before meals  insulin lispro (HumaLOG) corrective regimen sliding scale   SubCutaneous at bedtime  levothyroxine 175 MICROGram(s) Oral daily  milrinone Infusion 0.375 MICROgram(s)/kG/Min IV Continuous <Continuous>  rifaximin 550 milliGRAM(s) Oral two times a day  spironolactone 50 milliGRAM(s) Oral two times a day                            10.2   7.05  )-----------( 169      ( 18 Nov 2018 08:11 )             31.5       11-17    132<L>  |  84<L>  |  15  ----------------------------<  154<H>  3.8   |  31  |  0.81    Ca    7.7<L>      17 Nov 2018 18:52  Phos  4.0     11-17  Mg     1.8     11-18      T(C): 36.8 (11-18-18 @ 09:40), Max: 37.2 (11-18-18 @ 04:28)  HR: 113 (11-18-18 @ 09:40) (110 - 114)  BP: 114/81 (11-18-18 @ 09:40) (94/58 - 114/82)  RR: 19 (11-18-18 @ 09:40) (16 - 19)  SpO2: 95% (11-18-18 @ 09:40) (95% - 97%)  Wt(kg): --    JVP 14  tachy, no murmurs  lungs with rales  soft nt/nd  + 2 edema    device interrogation normal  EKG: NSR, Biv pacing    A/P) She is a pleasant 62 y/o female PMH severe NICM (LVEF 10-15%) who had a [employer] Biv-ICD implanted at [facility]. A recent LHC at [facility] was also unremarkable. She now returns for with a severe decompensation of her NICM. Her device interrogation demonstrates excellent RA, RV and LV lead function. She denies palpitations nor high voltage therapy.     -continue bumex and milrinone as patient is still grossly volume overloaded, f/u cardiology  -f/u CHF consult for advanced therapies (VAD vs transplant)  -no need for AAD  -Her biv-icd was recently optimized by me. Future options including turning off BiV pacing to see if she responds better. This can be addressed when she's euvolemic  -will follow  -f/u with [facility] after discharge

## 2018-11-18 NOTE — PROVIDER CONTACT NOTE (OTHER) - ASSESSMENT
Pt. a&ox3, vss, pt. is having scant vaginal bleeding, no c/o of c/p. SOB, dizziness, headache, palpitations. Abdomen is tender, bowel sounds are active. Pt. had a BM over night.

## 2018-11-18 NOTE — CHART NOTE - NSCHARTNOTEFT_GEN_A_CORE
Called by RN that, pt c/o crampy abdominal Pain and vaginal spotting noted. Pt was evaluated at bedside. Abdominal Pain generalized, 6/10, aching in quality, no alleviating or aggravating factors. Pt is a 64yo post-menopausal F w/ pmh of CHF, thyroid ca, HTN, DM, daily ASA user,  c/o vaginal bleeding that she noticed this morning. Pt states she woke up feeling fatigued with abdominal pressure and that when she sat down, she noticed that she had bled through her underwear. Pt has not experienced any bleeding episodes like this in the past. she admits to normal urinary and bowel functions and denies any HA, dizziness, SOB, CP, N/V/D, constipation. Pt has no known uterine pathologies and has not seen a GYN in 15-20 years. Currently Pt reports fatigue and daughter states she has been in this state for the past couple weeks since she was discharged from hospital for CHF exacerbation.     - Oxycodone 5mg PO STAT.   - Ob/Gyn consulted.   - f/u Ob/Gyn recs.   - Ob/Gyn: Pending Endometrial Bx when INR better.   - AXR ordered.   - Maalox x1, Simethicone x1.   - GI: Elevated liver enzymes. 2/2 Congestive hepatopathy,  imaging not impressive, serologic workup negative  complete workup would include transjugular liver biopsy with pressure measurement  - f/u Amylase, Lipase and LFT.   - f/u GI recs.   - Will endorse to primary team in am.    MARYAN. EMILY LA   #[id number]  Medicine PA. Called by RN that, pt c/o crampy abdominal Pain and vaginal spotting noted. Pt was evaluated at bedside. Abdominal Pain generalized, 6/10, aching in quality, no alleviating or aggravating factors. Pt is a 64yo post-menopausal F w/ pmh of CHF, thyroid ca, HTN, DM, daily ASA user, presents to ED c/o vaginal bleeding  Pt states she woke up feeling fatigued with abdominal pressure and that when she sat down, she noticed that she had bled through her underwear. Pt has not experienced any bleeding episodes like this in the past. she admits to normal urinary and bowel functions and denies any HA, dizziness, SOB, CP, N/V/D, constipation. Pt has no known uterine pathologies and has not seen a GYN in 15-20 years.     - Oxycodone 5mg PO STAT.   - Ob/Gyn consulted.   - f/u Ob/Gyn recs.   - Ob/Gyn: Pending Endometrial Bx when INR better.   - AXR ordered.   - Maalox x1, Simethicone x1.   - GI: Elevated liver enzymes. 2/2 Congestive hepatopathy,  imaging not impressive, serologic workup negative  complete workup would include transjugular liver biopsy with pressure measurement  - f/u Amylase, Lipase and LFT.   - f/u GI recs.   - Will endorse to primary team in am.    MARYAN. EMILY LA   #[id number]  Medicine PA.

## 2018-11-18 NOTE — PROGRESS NOTE ADULT - ATTENDING COMMENTS
Agree with above assessment and plan as outlined above.    - Cont Bumex and milrinone    Augusto Grimes MD, FACC

## 2018-11-18 NOTE — PROGRESS NOTE ADULT - SUBJECTIVE AND OBJECTIVE BOX
Patient seen and examined in bed; no new events.  NAD.    REVIEW OF SYSTEMS:  As per HPI, otherwise 8 full 10 ROS were unremarkable    MEDICATIONS  (STANDING):  aspirin  chewable 81 milliGRAM(s) Oral daily  Biotene Dry Mouth Oral Rinse 5 milliLiter(s) Swish and Spit two times a day  buMETAnide Infusion 2 mG/Hr (10 mL/Hr) IV Continuous <Continuous>  calcitriol   Capsule 0.5 MICROGram(s) Oral daily  calcium acetate 2001 milliGRAM(s) Oral three times a day with meals  calcium carbonate 1250 mG  + Vitamin D (OsCal 500 + D) 2 Tablet(s) Oral three times a day  gabapentin 200 milliGRAM(s) Oral three times a day  influenza   Vaccine 0.5 milliLiter(s) IntraMuscular once  insulin lispro (HumaLOG) corrective regimen sliding scale   SubCutaneous three times a day before meals  insulin lispro (HumaLOG) corrective regimen sliding scale   SubCutaneous at bedtime  levothyroxine 175 MICROGram(s) Oral daily  milrinone Infusion 0.375 MICROgram(s)/kG/Min (10.89 mL/Hr) IV Continuous <Continuous>  rifaximin 550 milliGRAM(s) Oral two times a day  spironolactone 50 milliGRAM(s) Oral two times a day      VITAL:  T(C): , Max: 37.2 (11-18-18 @ 04:28)  T(F): , Max: 98.9 (11-18-18 @ 04:28)  HR: 113 (11-18-18 @ 09:40)  BP: 114/81 (11-18-18 @ 09:40)  BP(mean): --  RR: 19 (11-18-18 @ 09:40)  SpO2: 95% (11-18-18 @ 09:40)  Wt(kg): --    I and O's:    11-17 @ 07:01  -  11-18 @ 07:00  --------------------------------------------------------  IN: 220 mL / OUT: 1400 mL / NET: -1180 mL          PHYSICAL EXAM:    Constitutional: NAD, Alert  HEENT: NCAT, MMM  Neck: Supple, (+) JVD  Respiratory: CTA b/l  Cardiovascular: reg, tachy s1s2  Gastrointestinal: BS+, soft, NT, (+)mild distension  Extremities: 2+ b/l LE edema  Neurological: no focal deficits; strength grossly intact  Back: no CVAT b/l  Skin: No rashes, no nevi    LABS:                        10.2   7.05  )-----------( 169      ( 18 Nov 2018 08:11 )             31.5     11-17    132<L>  |  84<L>  |  15  ----------------------------<  154<H>  3.8   |  31  |  0.81    Ca    7.7<L>      17 Nov 2018 18:52  Phos  4.0     11-17  Mg     1.8     11-18      ASSESSMENT: 63F w/ HTN, DM2, thyroid CA s/p resection, acquired hypoparathyroidism, and severe HFrEF, 11/6/18 a/w vaginal bleeding/MAGNUS/electrolyte derangements/ acute on chronic HFrEF    (1)Renal - MAGNUS - Prerenally mediated from decompensated CHF. Now resolved; renal fxn remains stable as of late    (2)Hypokalemia -in setting of placement on bumex gtt/aggressive diuresis overnight; improving; stable    (3)Hypomagnesemia - in association with diuresis; ordered for repletion today    (4)Bone - hypocalcemia/hyperphosphatemia due to hypoparathyroidism (s/p iatrogenic parathyroidectomy). Very chronic. Improved relative to admission, although downtrending as of today. The recent drop in calcium today could be from the Bumex; hypomagnesemia could also be playing a role here; s/p Ca+ IV administration as of late; improved; acceptable for now     (5)CV - On Bumex (increased) and Primacor gtts; diuresing well; CHF on board     (6)Hyponatremia:  likely hypervolemic; acceptable as of late        RECOMMEND:  (1)A/w increased Bumex gtt (2mg/hr) as ordered; management per CHF   (2)Continue to monitor lytes; replete PRN; maintain K+ > 4.0; Mg>2.0  (3)BMP pending today  (4)Continue oral calcium as ordered; no need for IV this AM  (5)Continue Spironolactone 50 bid for now  (6)Strict I & Os   (7)Maintain 1L FR as ordered   (8)daily weights   volume overloaded; give bolus bumex 4 mg IV and then increase to 2 mg/hr  - hypokalemia; agree with increaseing marcel to 50 mg bid   - standing weights daily

## 2018-11-18 NOTE — PROGRESS NOTE ADULT - ASSESSMENT
The Pt is a 61 y/o woman with h/o NICM (LVEF 20%, LVIDd 6.5 cm), initially identified in 2013, s/p CRT-D (6/2017), Mod-Severe MR, Severe TR, HTN, HLD, DM II who presented to the ED with c/o vaginal bleeding in the setting of an elevated INR, which was likely the result of Congestive Hepatopathy. She has multiple findings that suggest that she is in Cardiogenic Shock complicated by UTI, and has been started on empiric inotropic support. She improved significantly with inotropic support and diuresis, but had a 3 lb weight gain after transitioning to oral diuretic therapy and was restarted on IV diuretics, to which she initially responded favorably, but remains with evidence of elevated filling pressures

## 2018-11-19 LAB
ANION GAP SERPL CALC-SCNC: 15 MMOL/L — SIGNIFICANT CHANGE UP (ref 5–17)
ANION GAP SERPL CALC-SCNC: 16 MMOL/L — SIGNIFICANT CHANGE UP (ref 5–17)
ANION GAP SERPL CALC-SCNC: 17 MMOL/L — SIGNIFICANT CHANGE UP (ref 5–17)
BUN SERPL-MCNC: 15 MG/DL — SIGNIFICANT CHANGE UP (ref 7–23)
BUN SERPL-MCNC: 17 MG/DL — SIGNIFICANT CHANGE UP (ref 7–23)
BUN SERPL-MCNC: 19 MG/DL — SIGNIFICANT CHANGE UP (ref 7–23)
CALCIUM SERPL-MCNC: 6.9 MG/DL — LOW (ref 8.4–10.5)
CALCIUM SERPL-MCNC: 7.7 MG/DL — LOW (ref 8.4–10.5)
CALCIUM SERPL-MCNC: 8.3 MG/DL — LOW (ref 8.4–10.5)
CHLORIDE SERPL-SCNC: 81 MMOL/L — LOW (ref 96–108)
CHLORIDE SERPL-SCNC: 86 MMOL/L — LOW (ref 96–108)
CHLORIDE SERPL-SCNC: 88 MMOL/L — LOW (ref 96–108)
CO2 SERPL-SCNC: 23 MMOL/L — SIGNIFICANT CHANGE UP (ref 22–31)
CO2 SERPL-SCNC: 32 MMOL/L — HIGH (ref 22–31)
CO2 SERPL-SCNC: 34 MMOL/L — HIGH (ref 22–31)
CREAT SERPL-MCNC: 0.85 MG/DL — SIGNIFICANT CHANGE UP (ref 0.5–1.3)
CREAT SERPL-MCNC: 0.91 MG/DL — SIGNIFICANT CHANGE UP (ref 0.5–1.3)
CREAT SERPL-MCNC: 0.93 MG/DL — SIGNIFICANT CHANGE UP (ref 0.5–1.3)
GLUCOSE BLDC GLUCOMTR-MCNC: 105 MG/DL — HIGH (ref 70–99)
GLUCOSE BLDC GLUCOMTR-MCNC: 175 MG/DL — HIGH (ref 70–99)
GLUCOSE BLDC GLUCOMTR-MCNC: 183 MG/DL — HIGH (ref 70–99)
GLUCOSE BLDC GLUCOMTR-MCNC: 210 MG/DL — HIGH (ref 70–99)
GLUCOSE SERPL-MCNC: 144 MG/DL — HIGH (ref 70–99)
GLUCOSE SERPL-MCNC: 151 MG/DL — HIGH (ref 70–99)
GLUCOSE SERPL-MCNC: 189 MG/DL — HIGH (ref 70–99)
HCT VFR BLD CALC: 31.3 % — LOW (ref 34.5–45)
HGB BLD-MCNC: 9.9 G/DL — LOW (ref 11.5–15.5)
MAGNESIUM SERPL-MCNC: 1.6 MG/DL — SIGNIFICANT CHANGE UP (ref 1.6–2.6)
MAGNESIUM SERPL-MCNC: 1.7 MG/DL — SIGNIFICANT CHANGE UP (ref 1.6–2.6)
MCHC RBC-ENTMCNC: 23.5 PG — LOW (ref 27–34)
MCHC RBC-ENTMCNC: 31.6 GM/DL — LOW (ref 32–36)
MCV RBC AUTO: 74.3 FL — LOW (ref 80–100)
PLATELET # BLD AUTO: 211 K/UL — SIGNIFICANT CHANGE UP (ref 150–400)
POTASSIUM SERPL-MCNC: 2.9 MMOL/L — CRITICAL LOW (ref 3.5–5.3)
POTASSIUM SERPL-MCNC: 3 MMOL/L — LOW (ref 3.5–5.3)
POTASSIUM SERPL-MCNC: SIGNIFICANT CHANGE UP (ref 3.5–5.3)
POTASSIUM SERPL-SCNC: 2.9 MMOL/L — CRITICAL LOW (ref 3.5–5.3)
POTASSIUM SERPL-SCNC: 3 MMOL/L — LOW (ref 3.5–5.3)
POTASSIUM SERPL-SCNC: SIGNIFICANT CHANGE UP (ref 3.5–5.3)
RBC # BLD: 4.21 M/UL — SIGNIFICANT CHANGE UP (ref 3.8–5.2)
RBC # FLD: 26.1 % — HIGH (ref 10.3–14.5)
SODIUM SERPL-SCNC: 128 MMOL/L — LOW (ref 135–145)
SODIUM SERPL-SCNC: 131 MMOL/L — LOW (ref 135–145)
SODIUM SERPL-SCNC: 133 MMOL/L — LOW (ref 135–145)
WBC # BLD: 5.88 K/UL — SIGNIFICANT CHANGE UP (ref 3.8–10.5)
WBC # FLD AUTO: 5.88 K/UL — SIGNIFICANT CHANGE UP (ref 3.8–10.5)

## 2018-11-19 RX ORDER — POTASSIUM CHLORIDE 20 MEQ
40 PACKET (EA) ORAL EVERY 4 HOURS
Qty: 0 | Refills: 0 | Status: COMPLETED | OUTPATIENT
Start: 2018-11-19 | End: 2018-11-19

## 2018-11-19 RX ORDER — ONDANSETRON 8 MG/1
4 TABLET, FILM COATED ORAL ONCE
Qty: 0 | Refills: 0 | Status: COMPLETED | OUTPATIENT
Start: 2018-11-19 | End: 2018-11-19

## 2018-11-19 RX ORDER — PANTOPRAZOLE SODIUM 20 MG/1
40 TABLET, DELAYED RELEASE ORAL
Qty: 0 | Refills: 0 | Status: DISCONTINUED | OUTPATIENT
Start: 2018-11-19 | End: 2018-11-23

## 2018-11-19 RX ORDER — MAGNESIUM SULFATE 500 MG/ML
1 VIAL (ML) INJECTION ONCE
Qty: 0 | Refills: 0 | Status: COMPLETED | OUTPATIENT
Start: 2018-11-19 | End: 2018-11-19

## 2018-11-19 RX ORDER — POTASSIUM CHLORIDE 20 MEQ
20 PACKET (EA) ORAL
Qty: 0 | Refills: 0 | Status: COMPLETED | OUTPATIENT
Start: 2018-11-19 | End: 2018-11-19

## 2018-11-19 RX ORDER — MAGNESIUM SULFATE 500 MG/ML
2 VIAL (ML) INJECTION ONCE
Qty: 0 | Refills: 0 | Status: COMPLETED | OUTPATIENT
Start: 2018-11-19 | End: 2018-11-19

## 2018-11-19 RX ADMIN — CALCITRIOL 0.5 MICROGRAM(S): 0.5 CAPSULE ORAL at 14:37

## 2018-11-19 RX ADMIN — GABAPENTIN 200 MILLIGRAM(S): 400 CAPSULE ORAL at 14:36

## 2018-11-19 RX ADMIN — Medication 100 MILLIGRAM(S): at 23:12

## 2018-11-19 RX ADMIN — Medication 175 MICROGRAM(S): at 05:06

## 2018-11-19 RX ADMIN — Medication 2 TABLET(S): at 21:42

## 2018-11-19 RX ADMIN — SPIRONOLACTONE 50 MILLIGRAM(S): 25 TABLET, FILM COATED ORAL at 05:06

## 2018-11-19 RX ADMIN — Medication 50 GRAM(S): at 07:02

## 2018-11-19 RX ADMIN — Medication 2: at 17:30

## 2018-11-19 RX ADMIN — Medication 2001 MILLIGRAM(S): at 17:41

## 2018-11-19 RX ADMIN — Medication 5 MILLILITER(S): at 17:43

## 2018-11-19 RX ADMIN — Medication 50 MILLIEQUIVALENT(S): at 05:06

## 2018-11-19 RX ADMIN — Medication 5 MILLILITER(S): at 05:06

## 2018-11-19 RX ADMIN — Medication 100 MILLIGRAM(S): at 02:48

## 2018-11-19 RX ADMIN — Medication 50 MILLIEQUIVALENT(S): at 10:48

## 2018-11-19 RX ADMIN — Medication 2 TABLET(S): at 14:36

## 2018-11-19 RX ADMIN — MILRINONE LACTATE 10.89 MICROGRAM(S)/KG/MIN: 1 INJECTION, SOLUTION INTRAVENOUS at 22:17

## 2018-11-19 RX ADMIN — Medication 2001 MILLIGRAM(S): at 13:22

## 2018-11-19 RX ADMIN — MILRINONE LACTATE 10.89 MICROGRAM(S)/KG/MIN: 1 INJECTION, SOLUTION INTRAVENOUS at 09:11

## 2018-11-19 RX ADMIN — Medication 2 TABLET(S): at 05:05

## 2018-11-19 RX ADMIN — BUMETANIDE 10 MG/HR: 0.25 INJECTION INTRAMUSCULAR; INTRAVENOUS at 09:11

## 2018-11-19 RX ADMIN — Medication 40 MILLIEQUIVALENT(S): at 19:14

## 2018-11-19 RX ADMIN — PANTOPRAZOLE SODIUM 40 MILLIGRAM(S): 20 TABLET, DELAYED RELEASE ORAL at 12:37

## 2018-11-19 RX ADMIN — Medication 50 MILLIEQUIVALENT(S): at 07:46

## 2018-11-19 RX ADMIN — Medication 81 MILLIGRAM(S): at 14:37

## 2018-11-19 RX ADMIN — SPIRONOLACTONE 50 MILLIGRAM(S): 25 TABLET, FILM COATED ORAL at 17:41

## 2018-11-19 RX ADMIN — Medication 1: at 12:18

## 2018-11-19 RX ADMIN — GABAPENTIN 200 MILLIGRAM(S): 400 CAPSULE ORAL at 21:42

## 2018-11-19 RX ADMIN — GABAPENTIN 200 MILLIGRAM(S): 400 CAPSULE ORAL at 05:05

## 2018-11-19 RX ADMIN — Medication 40 MILLIEQUIVALENT(S): at 21:42

## 2018-11-19 RX ADMIN — Medication 100 GRAM(S): at 20:05

## 2018-11-19 RX ADMIN — Medication 2001 MILLIGRAM(S): at 09:11

## 2018-11-19 NOTE — PROVIDER CONTACT NOTE (CRITICAL VALUE NOTIFICATION) - ACTION/TREATMENT ORDERED:
40mEq KCL po every 4 hours x2
calcium chloride
Above provider aware. K supplement will be given
Aware Pt is on BMB Q 12 hr
administer supplemental potassium as ordered by PA.
aware we will supplement the potasium

## 2018-11-19 NOTE — PROGRESS NOTE ADULT - SUBJECTIVE AND OBJECTIVE BOX
pt seen and examined, no complaints, ROS - .     acetaminophen   Tablet .. 975 milliGRAM(s) Oral every 4 hours PRN  aspirin  chewable 81 milliGRAM(s) Oral daily  Biotene Dry Mouth Oral Rinse 5 milliLiter(s) Swish and Spit two times a day  buMETAnide Infusion 2 mG/Hr IV Continuous <Continuous>  calcitriol   Capsule 0.5 MICROGram(s) Oral daily  calcium acetate 2001 milliGRAM(s) Oral three times a day with meals  calcium carbonate 1250 mG  + Vitamin D (OsCal 500 + D) 2 Tablet(s) Oral three times a day  gabapentin 200 milliGRAM(s) Oral three times a day  guaiFENesin   Syrup  (Sugar-Free) 100 milliGRAM(s) Oral every 6 hours PRN  influenza   Vaccine 0.5 milliLiter(s) IntraMuscular once  insulin lispro (HumaLOG) corrective regimen sliding scale   SubCutaneous three times a day before meals  insulin lispro (HumaLOG) corrective regimen sliding scale   SubCutaneous at bedtime  levothyroxine 175 MICROGram(s) Oral daily  magnesium sulfate  IVPB 2 Gram(s) IV Intermittent once  milrinone Infusion 0.375 MICROgram(s)/kG/Min IV Continuous <Continuous>  potassium chloride  20 mEq/100 mL IVPB 20 milliEquivalent(s) IV Intermittent every 2 hours  rifaximin 550 milliGRAM(s) Oral two times a day  spironolactone 50 milliGRAM(s) Oral two times a day                            10.2   7.05  )-----------( 169      ( 18 Nov 2018 08:11 )             31.5       Hemoglobin: 10.2 g/dL (11-18 @ 08:11)  Hemoglobin: 10.4 g/dL (11-17 @ 07:16)  Hemoglobin: 10.0 g/dL (11-16 @ 07:55)  Hemoglobin: 10.0 g/dL (11-15 @ 07:53)  Hemoglobin: 10.4 g/dL (11-14 @ 07:43)      11-19    133<L>  |  86<L>  |  19  ----------------------------<  144<H>  3.0<L>   |  32<H>  |  0.93    Ca    7.7<L>      19 Nov 2018 02:23  Phos  4.0     11-17  Mg     1.6     11-19      Creatinine Trend: 0.93<--, 0.97<--, 0.81<--, 0.81<--, 0.80<--, 0.74<--    COAGS:           T(C): 36.5 (11-19-18 @ 04:26), Max: 36.8 (11-18-18 @ 09:40)  HR: 99 (11-19-18 @ 04:26) (99 - 113)  BP: 114/81 (11-19-18 @ 04:26) (104/73 - 114/81)  RR: 18 (11-19-18 @ 04:26) (18 - 19)  SpO2: 98% (11-19-18 @ 04:26) (95% - 98%)  Wt(kg): --    I&O's Summary    17 Nov 2018 07:01  -  18 Nov 2018 07:00  --------------------------------------------------------  IN: 220 mL / OUT: 1400 mL / NET: -1180 mL    18 Nov 2018 07:01  -  19 Nov 2018 06:52  --------------------------------------------------------  IN: 180 mL / OUT: 3750 mL / NET: -3570 mL      lymphatic: No lymphadenopathy + edema in LE bilaterally   Cardiovascular: Normal S1 S2,RRR, No murmurs ,   Respiratory: Lungs clear to auscultation, normal effort 	  Gastrointestinal:  Soft, distended   Skin: No rashes, No ecchymoses, No cyanosis,       TELEMETRY: ST, , 15 beats NSVT	    ECG: < from: 12 Lead ECG (11.06.18 @ 19:34) >  Atrial-sensed ventricular-paced rhythm  Biventricular pacemaker detected  ABNORMAL ECG    < end of copied text >    	  RADIOLOGY:   DIAGNOSTIC TESTING:  [ ] Echocardiogram: < from: Transthoracic Echocardiogram (08.23.18 @ 11:25) >  1. Tethered mitral valve leaflets with normal opening.  Moderate-severe mitral regurgitation.  2. Normal left ventricular internal dimensions and wall  thicknesses.  3. Severe global left ventricular systolic dysfunction.  4. Moderate right atrial enlargement.  A device wire is  noted in the right heart.  5. Right ventricular enlargement with decreased right  ventricular systolic function.  6. Normal tricuspid valve.  Severe tricuspid regurgitation.  7. Estimated pulmonary artery systolic pressure equals 24  mm Hg, assuming right atrial pressure equals 10  mm Hg,  consistent with normal pulmonary pressures.  *** Compared with echocardiogram of 6/29/2018, no  significant changes noted.    < end of copied text >    [ ]  Catheterization:  [ ] Stress Test:    OTHER: 	      ASSESSMENT/PLAN: 62 y/o F with PMHx of NICM s/p AICD, HTN, moderate-severe MR, DM, ? medication compliance who was admitted with volume overload and heart failure.    -continue with inotrope ( primacor)  assisted diuresis per heart failure  - cont Bumex   -continue with IV diuresis  -follow up EP re: NSVT  -heart failure follow up   -no further interventional workup or ischemic evaluation needed at this time  D/W Dr Muller

## 2018-11-19 NOTE — PROGRESS NOTE ADULT - SUBJECTIVE AND OBJECTIVE BOX
S: Patient seen and examined at the bedside thir morning. Denies CP or SOB.      ROS otherwise negative      MEDICATIONS  (STANDING):  aspirin  chewable 81 milliGRAM(s) Oral daily  Biotene Dry Mouth Oral Rinse 5 milliLiter(s) Swish and Spit two times a day  buMETAnide Infusion 2 mG/Hr (10 mL/Hr) IV Continuous <Continuous>  calcitriol   Capsule 0.5 MICROGram(s) Oral daily  calcium acetate 2001 milliGRAM(s) Oral three times a day with meals  calcium carbonate 1250 mG  + Vitamin D (OsCal 500 + D) 2 Tablet(s) Oral three times a day  gabapentin 200 milliGRAM(s) Oral three times a day  influenza   Vaccine 0.5 milliLiter(s) IntraMuscular once  insulin lispro (HumaLOG) corrective regimen sliding scale   SubCutaneous three times a day before meals  insulin lispro (HumaLOG) corrective regimen sliding scale   SubCutaneous at bedtime  levothyroxine 175 MICROGram(s) Oral daily  milrinone Infusion 0.375 MICROgram(s)/kG/Min (10.89 mL/Hr) IV Continuous <Continuous>  potassium chloride  20 mEq/100 mL IVPB 20 milliEquivalent(s) IV Intermittent every 2 hours  rifaximin 550 milliGRAM(s) Oral two times a day  spironolactone 50 milliGRAM(s) Oral two times a day    MEDICATIONS  (PRN):  acetaminophen   Tablet .. 975 milliGRAM(s) Oral every 4 hours PRN Mild Pain (1 - 3)  guaiFENesin   Syrup  (Sugar-Free) 100 milliGRAM(s) Oral every 6 hours PRN Cough      LABS:                        9.9    5.88  )-----------( 211      ( 19 Nov 2018 09:05 )             31.3     Hemoglobin: 9.9 g/dL (11-19 @ 09:05)  Hemoglobin: 10.2 g/dL (11-18 @ 08:11)  Hemoglobin: 10.4 g/dL (11-17 @ 07:16)  Hemoglobin: 10.0 g/dL (11-16 @ 07:55)  Hemoglobin: 10.0 g/dL (11-15 @ 07:53)    11-19    133<L>  |  86<L>  |  19  ----------------------------<  144<H>  3.0<L>   |  32<H>  |  0.93    Ca    7.7<L>      19 Nov 2018 02:23  Mg     1.6     11-19      Creatinine Trend: 0.93<--, 0.97<--, 0.91<--, 0.81<--, 0.81<--, 0.80<--           PHYSICAL EXAM  Vital Signs Last 24 Hrs  T(C): 36.5 (19 Nov 2018 04:26), Max: 36.5 (18 Nov 2018 14:41)  T(F): 97.7 (19 Nov 2018 04:26), Max: 97.7 (18 Nov 2018 14:41)  HR: 99 (19 Nov 2018 04:26) (99 - 105)  BP: 114/81 (19 Nov 2018 04:26) (104/73 - 114/81)  BP(mean): --  RR: 18 (19 Nov 2018 04:26) (18 - 18)  SpO2: 98% (19 Nov 2018 04:26) (97% - 98%)    Lymphatic: No lymphadenopathy + edema in LE bilaterally   Cardiovascular: Normal S1 S2,RRR, No murmur   Respiratory: Lungs clear to auscultation, normal effort 	  Gastrointestinal:  Soft, distended   Skin: No rashes, No ecchymoses, No cyanosis,     DATA:    TELEMETRY: 	      	    < from: TTE with Doppler (w/Cont) (11.07.18 @ 05:53) >  Conclusions:  1. Tethered mitral valve leaflets with normal opening.  Mitral annular calcification and thickened  mitral leaflet  tips Moderate mitral regurgitation.  2. Calcified trileaflet aortic valve with normal opening.  3. Moderately dilated left atrium.  LA volume index = 43  cc/m2.  4. Eccentric left ventricular hypertrophy (dilated left  ventricle with normal relative wall thickness).  5. Severe global left ventricular systolic dysfunction.  Endocardial visualization enhanced with intravenous  injection of Ultrasonic Enhancing Agent (Definity). No LV  thrombus.  Septal flattening consistent with right  ventricular overload.  6. Severe diastolic dysfunction with elevated filling  pressures  7. Severe right atrial enlargement.  8. Right ventricular enlargement with decreased right  ventricular systolic function.  A device wire is noted in  the right heart.  9. Dilated tricuspid annulus with malcoaptation of  tricuspid leaflets. Severe tricuspid regurgitation.  *** No previous Echo exam.  ------------------------------------------------------------------------  Confirmed on  11/7/2018 - 16:40:23 by Haley Koch M.D.    < end of copied text >      < from: Cardiac Cath Lab - Adult (05.25.18 @ 11:46) >  DIAGNOSTIC RECOMMENDATIONS: Right heart catheterization demonstrates  elevated PCWP and low cardiac output. Will transfer patient to the CCU for  inotrope assisted diuresis.  Prepared and signed by  Kunal Muller M.D.  Signed 05/29/2018 16:07:27    < end of copied text >      ASSESSMENT/PLAN: 64 y/o F with PMHx of NICM s/p AICD, HTN, moderate-severe MR, reportedly recent LHC at [place] was negative, thyroid cancer s/p thyroidectomy, DM,  admitted with decompensated acute on chronic systolic heart failure.    - Continue with Primacor/Bumex/Aldactone.  Maintain o>I.  - Patient hypokalemic today. Repletion per Nephrology  - daily labs  - no further ischemic evaluation needed at this time  - follow up GYN     Adele Miller PA-C S: Patient seen and examined at the bedside thir morning. Denies CP or SOB.      ROS otherwise negative      MEDICATIONS  (STANDING):  aspirin  chewable 81 milliGRAM(s) Oral daily  Biotene Dry Mouth Oral Rinse 5 milliLiter(s) Swish and Spit two times a day  buMETAnide Infusion 2 mG/Hr (10 mL/Hr) IV Continuous <Continuous>  calcitriol   Capsule 0.5 MICROGram(s) Oral daily  calcium acetate 2001 milliGRAM(s) Oral three times a day with meals  calcium carbonate 1250 mG  + Vitamin D (OsCal 500 + D) 2 Tablet(s) Oral three times a day  gabapentin 200 milliGRAM(s) Oral three times a day  influenza   Vaccine 0.5 milliLiter(s) IntraMuscular once  insulin lispro (HumaLOG) corrective regimen sliding scale   SubCutaneous three times a day before meals  insulin lispro (HumaLOG) corrective regimen sliding scale   SubCutaneous at bedtime  levothyroxine 175 MICROGram(s) Oral daily  milrinone Infusion 0.375 MICROgram(s)/kG/Min (10.89 mL/Hr) IV Continuous <Continuous>  potassium chloride  20 mEq/100 mL IVPB 20 milliEquivalent(s) IV Intermittent every 2 hours  rifaximin 550 milliGRAM(s) Oral two times a day  spironolactone 50 milliGRAM(s) Oral two times a day    MEDICATIONS  (PRN):  acetaminophen   Tablet .. 975 milliGRAM(s) Oral every 4 hours PRN Mild Pain (1 - 3)  guaiFENesin   Syrup  (Sugar-Free) 100 milliGRAM(s) Oral every 6 hours PRN Cough      LABS:                        9.9    5.88  )-----------( 211      ( 19 Nov 2018 09:05 )             31.3     Hemoglobin: 9.9 g/dL (11-19 @ 09:05)  Hemoglobin: 10.2 g/dL (11-18 @ 08:11)  Hemoglobin: 10.4 g/dL (11-17 @ 07:16)  Hemoglobin: 10.0 g/dL (11-16 @ 07:55)  Hemoglobin: 10.0 g/dL (11-15 @ 07:53)    11-19    133<L>  |  86<L>  |  19  ----------------------------<  144<H>  3.0<L>   |  32<H>  |  0.93    Ca    7.7<L>      19 Nov 2018 02:23  Mg     1.6     11-19      Creatinine Trend: 0.93<--, 0.97<--, 0.91<--, 0.81<--, 0.81<--, 0.80<--           PHYSICAL EXAM  Vital Signs Last 24 Hrs  T(C): 36.5 (19 Nov 2018 04:26), Max: 36.5 (18 Nov 2018 14:41)  T(F): 97.7 (19 Nov 2018 04:26), Max: 97.7 (18 Nov 2018 14:41)  HR: 99 (19 Nov 2018 04:26) (99 - 105)  BP: 114/81 (19 Nov 2018 04:26) (104/73 - 114/81)  BP(mean): --  RR: 18 (19 Nov 2018 04:26) (18 - 18)  SpO2: 98% (19 Nov 2018 04:26) (97% - 98%)    Lymphatic: No lymphadenopathy + edema in LE bilaterally   Cardiovascular: Normal S1 S2,RRR, No murmur   Respiratory: Lungs clear to auscultation, normal effort 	  Gastrointestinal:  Soft, distended   Skin: No rashes, No ecchymoses, No cyanosis,     DATA:    TELEMETRY: 	      	    < from: TTE with Doppler (w/Cont) (11.07.18 @ 05:53) >  Conclusions:  1. Tethered mitral valve leaflets with normal opening.  Mitral annular calcification and thickened  mitral leaflet  tips Moderate mitral regurgitation.  2. Calcified trileaflet aortic valve with normal opening.  3. Moderately dilated left atrium.  LA volume index = 43  cc/m2.  4. Eccentric left ventricular hypertrophy (dilated left  ventricle with normal relative wall thickness).  5. Severe global left ventricular systolic dysfunction.  Endocardial visualization enhanced with intravenous  injection of Ultrasonic Enhancing Agent (Definity). No LV  thrombus.  Septal flattening consistent with right  ventricular overload.  6. Severe diastolic dysfunction with elevated filling  pressures  7. Severe right atrial enlargement.  8. Right ventricular enlargement with decreased right  ventricular systolic function.  A device wire is noted in  the right heart.  9. Dilated tricuspid annulus with malcoaptation of  tricuspid leaflets. Severe tricuspid regurgitation.  *** No previous Echo exam.  ------------------------------------------------------------------------  Confirmed on  11/7/2018 - 16:40:23 by Haley Koch M.D.    < end of copied text >      < from: Cardiac Cath Lab - Adult (05.25.18 @ 11:46) >  DIAGNOSTIC RECOMMENDATIONS: Right heart catheterization demonstrates  elevated PCWP and low cardiac output. Will transfer patient to the CCU for  inotrope assisted diuresis.  Prepared and signed by  Kunal Muller M.D.  Signed 05/29/2018 16:07:27    < end of copied text >      ASSESSMENT/PLAN: 62 y/o F with PMHx of NICM s/p AICD, HTN, moderate-severe MR, reportedly recent LHC at [place] was negative, thyroid cancer s/p thyroidectomy, DM,  admitted with decompensated acute on chronic systolic heart failure.    - Continue with Primacor/Bumex/Aldactone.  Maintain o>I.  - Patient hypokalemic today. Repletion per Nephrology  - daily labs  - no further ischemic evaluation needed at this time      Adele Miller PA-C

## 2018-11-19 NOTE — PROGRESS NOTE ADULT - SUBJECTIVE AND OBJECTIVE BOX
Events noted. No agitation per NP. Had some vaginal spotting and abdominal pain over the weekend.       Vital Signs Last 24 Hrs  T(C): 36.5 (19 Nov 2018 04:26), Max: 36.5 (18 Nov 2018 14:41)  T(F): 97.7 (19 Nov 2018 04:26), Max: 97.7 (18 Nov 2018 14:41)  HR: 108 (19 Nov 2018 11:36) (99 - 108)  BP: 114/84 (19 Nov 2018 11:36) (104/73 - 114/84)  BP(mean): --  RR: 18 (19 Nov 2018 11:36) (18 - 18)  SpO2: 98% (19 Nov 2018 11:36) (97% - 98%)                          9.9    5.88  )-----------( 211      ( 19 Nov 2018 09:05 )             31.3       11-19    133<L>  |  86<L>  |  19  ----------------------------<  144<H>  3.0<L>   |  32<H>  |  0.93    Ca    7.7<L>      19 Nov 2018 02:23  Mg     1.6     11-19                MEDICATIONS  (STANDING):  aspirin  chewable 81 milliGRAM(s) Oral daily  Biotene Dry Mouth Oral Rinse 5 milliLiter(s) Swish and Spit two times a day  buMETAnide Infusion 2 mG/Hr (10 mL/Hr) IV Continuous <Continuous>  calcitriol   Capsule 0.5 MICROGram(s) Oral daily  calcium acetate 2001 milliGRAM(s) Oral three times a day with meals  calcium carbonate 1250 mG  + Vitamin D (OsCal 500 + D) 2 Tablet(s) Oral three times a day  gabapentin 200 milliGRAM(s) Oral three times a day  influenza   Vaccine 0.5 milliLiter(s) IntraMuscular once  insulin lispro (HumaLOG) corrective regimen sliding scale   SubCutaneous three times a day before meals  insulin lispro (HumaLOG) corrective regimen sliding scale   SubCutaneous at bedtime  levothyroxine 175 MICROGram(s) Oral daily  milrinone Infusion 0.375 MICROgram(s)/kG/Min (10.89 mL/Hr) IV Continuous <Continuous>  ondansetron Injectable 4 milliGRAM(s) IV Push once  pantoprazole    Tablet 40 milliGRAM(s) Oral before breakfast  rifaximin 550 milliGRAM(s) Oral two times a day  spironolactone 50 milliGRAM(s) Oral two times a day    MEDICATIONS  (PRN):  acetaminophen   Tablet .. 975 milliGRAM(s) Oral every 4 hours PRN Mild Pain (1 - 3)  guaiFENesin   Syrup  (Sugar-Free) 100 milliGRAM(s) Oral every 6 hours PRN Cough      BF in chair, calm, cooperative, alert and oriented x 3  .  No psychomotor abnormalities. Insight and judgment are fair. Speech is coherent with normal rate and volume. No hallucinations nor delusions. The patient denied suicidal and homicidal ideation and plan. Mood is euthymic and affect full range and appropriate.  Attention and concentration, short term memory, and long term memory within normal limits.

## 2018-11-19 NOTE — PROGRESS NOTE ADULT - SUBJECTIVE AND OBJECTIVE BOX
INTERVAL HPI/OVERNIGHT EVENTS: Was nauseated and vomited this AM.   Vital Signs Last 24 Hrs  T(C): 36.5 (19 Nov 2018 04:26), Max: 36.5 (18 Nov 2018 14:41)  T(F): 97.7 (19 Nov 2018 04:26), Max: 97.7 (18 Nov 2018 14:41)  HR: 108 (19 Nov 2018 11:36) (99 - 108)  BP: 114/84 (19 Nov 2018 11:36) (104/73 - 114/84)  BP(mean): --  RR: 18 (19 Nov 2018 11:36) (18 - 18)  SpO2: 98% (19 Nov 2018 11:36) (97% - 98%)  I&O's Summary    18 Nov 2018 07:01  -  19 Nov 2018 07:00  --------------------------------------------------------  IN: 180 mL / OUT: 3750 mL / NET: -3570 mL    19 Nov 2018 07:01  -  19 Nov 2018 12:33  --------------------------------------------------------  IN: 0 mL / OUT: 1100 mL / NET: -1100 mL      MEDICATIONS  (STANDING):  aspirin  chewable 81 milliGRAM(s) Oral daily  Biotene Dry Mouth Oral Rinse 5 milliLiter(s) Swish and Spit two times a day  buMETAnide Infusion 2 mG/Hr (10 mL/Hr) IV Continuous <Continuous>  calcitriol   Capsule 0.5 MICROGram(s) Oral daily  calcium acetate 2001 milliGRAM(s) Oral three times a day with meals  calcium carbonate 1250 mG  + Vitamin D (OsCal 500 + D) 2 Tablet(s) Oral three times a day  gabapentin 200 milliGRAM(s) Oral three times a day  influenza   Vaccine 0.5 milliLiter(s) IntraMuscular once  insulin lispro (HumaLOG) corrective regimen sliding scale   SubCutaneous three times a day before meals  insulin lispro (HumaLOG) corrective regimen sliding scale   SubCutaneous at bedtime  levothyroxine 175 MICROGram(s) Oral daily  milrinone Infusion 0.375 MICROgram(s)/kG/Min (10.89 mL/Hr) IV Continuous <Continuous>  ondansetron Injectable 4 milliGRAM(s) IV Push once  rifaximin 550 milliGRAM(s) Oral two times a day  spironolactone 50 milliGRAM(s) Oral two times a day    MEDICATIONS  (PRN):  acetaminophen   Tablet .. 975 milliGRAM(s) Oral every 4 hours PRN Mild Pain (1 - 3)  guaiFENesin   Syrup  (Sugar-Free) 100 milliGRAM(s) Oral every 6 hours PRN Cough    LABS:                        9.9    5.88  )-----------( 211      ( 19 Nov 2018 09:05 )             31.3     11-19    133<L>  |  86<L>  |  19  ----------------------------<  144<H>  3.0<L>   |  32<H>  |  0.93    Ca    7.7<L>      19 Nov 2018 02:23  Mg     1.6     11-19      PT/INR - ( 18 Nov 2018 08:15 )   PT: 25.9 sec;   INR: 2.22 ratio             CAPILLARY BLOOD GLUCOSE      POCT Blood Glucose.: 183 mg/dL (19 Nov 2018 12:12)  POCT Blood Glucose.: 105 mg/dL (19 Nov 2018 07:56)  POCT Blood Glucose.: 164 mg/dL (18 Nov 2018 21:29)  POCT Blood Glucose.: 178 mg/dL (18 Nov 2018 16:50)          REVIEW OF SYSTEMS:  CONSTITUTIONAL: No fever, weight loss, or fatigue  EYES: No eye pain, visual disturbances, or discharge  ENMT:  No difficulty hearing, tinnitus, vertigo; No sinus or throat pain  NECK: No pain or stiffness  RESPIRATORY: No cough, wheezing, chills or hemoptysis; No shortness of breath  CARDIOVASCULAR: No chest pain, palpitations, dizziness, or leg swelling  GASTROINTESTINAL: No abdominal or epigastric pain. No nausea, vomiting, or hematemesis; No diarrhea or constipation. No melena or hematochezia.  GENITOURINARY: No dysuria, frequency, hematuria, or incontinence  NEUROLOGICAL: No headaches, memory loss, loss of strength, numbness, or tremors      Consultant(s) Notes Reviewed:  [x ] YES  [ ] NO    PHYSICAL EXAM:  GENERAL: NAD, well-groomed, well-developed ,not in any distress ,  HEAD:  Atraumatic, Normocephalic  EYES: EOMI, PERRLA, conjunctiva and sclera clear  ENMT: No tonsillar erythema, exudates, or enlargement; Moist mucous membranes, Good dentition, No lesions  NECK: Supple, No JVD, Normal thyroid  NERVOUS SYSTEM:  Alert & Oriented X3, No focal deficit   CHEST/LUNG: Good air entry bilateral with no  rales, rhonchi, wheezing, or rubs  HEART: Regular rate and rhythm; No murmurs, rubs, or gallops  ABDOMEN: Soft, Nontender, Nondistended; Bowel sounds present  EXTREMITIES:  2+ Peripheral Pulses, No clubbing, cyanosis, or edema      Care Discussed with Consultants/Other Providers [ x] YES  [ ] NO

## 2018-11-19 NOTE — PROVIDER CONTACT NOTE (CRITICAL VALUE NOTIFICATION) - SITUATION
K=2.7
Blood resul;t CMP K level 2.5 cre 1.26
Potasium possible  contamination
Potassium 2.6
Pt admitted with CHF
potasium-2.9
vbg ionized calcium .67 , sodium 114

## 2018-11-19 NOTE — PROGRESS NOTE ADULT - ASSESSMENT
The Pt is a 63 y/o woman with h/o NICM (LVEF 20%, LVIDd 6.5 cm), initially identified in 2013, s/p CRT-D (6/2017), Mod-Severe MR, Severe TR, HTN, HLD, DM II who presented to the ED with c/o vaginal bleeding in the setting of an elevated INR, which was likely the result of Congestive Hepatopathy. She has multiple findings that suggest that she is in Cardiogenic Shock complicated by UTI, and has been started on empiric inotropic support. She improved significantly with inotropic support and diuresis, but had a 3 lb weight gain after transitioning to oral diuretic therapy and was restarted on IV diuretics, to which she initially responded favorably, but remains with evidence of elevated filling pressures.  Patient does not want further procedures for HF management. Patient has regained capacity to make decisions.

## 2018-11-19 NOTE — PROGRESS NOTE ADULT - ATTENDING COMMENTS
Ms Carson here with heart failure  Active issues  ADHF  NICM, EF 20%, LVEDD 6.5cm, s/p CRT , stage D now inotrope depedent  m-sMR, STR  HTN, DM  Encephalopathy resolved    has regained capacity as of today, very poor insight, I have tried to explain need for advanced therapies and she declines  I have also explained likelyhood of death once on inotropes, she understands  she states she will reconsider as outpatient    - picc line placement  - switch to bumex 3mg po bid tomorrow  - very poor prognosis due to poor insight and poor compliance

## 2018-11-19 NOTE — PROVIDER CONTACT NOTE (CRITICAL VALUE NOTIFICATION) - RECOMMENDATIONS
Supplement K and repeat CMP, Mg, Phos.
calcium, increased PO intake, fluids
K supplement
Monitor
notify PA for further recommendations
redramalcolm harrisum post supplementation
supplement the potasium

## 2018-11-19 NOTE — PROVIDER CONTACT NOTE (CRITICAL VALUE NOTIFICATION) - ASSESSMENT
Pt diuresing well; Pt appears paced on telemonitor.
Inatake and outout
No s/s of distress noted
PT denies any cp sob palpitation Pt is getting potasium supplementation
Pt denies any cp sob palpitation
pt asleep in recliner. denies cp or palpitations

## 2018-11-19 NOTE — PROGRESS NOTE ADULT - ASSESSMENT
Pt. has regained her capacity to make medical decisions as she is better able to appreciate her medical problems and reason.     Recommend  No standing psychiatric meds.   Following.    Momo Ortiz M.D.  Psychiatry  [phone]

## 2018-11-19 NOTE — PROGRESS NOTE ADULT - ATTENDING COMMENTS
Patient seen and examined, agree with above assessment and plan as transcribed above.    - Diuresed approx 1800 cc overnight  - Cont current meds    Augusto Grimes MD, FACC

## 2018-11-19 NOTE — PROGRESS NOTE ADULT - SUBJECTIVE AND OBJECTIVE BOX
EP ATTENDING    tele: NSR, biv pacing    no palpitations, no syncope, no angina    remains on bumex and milrinone drips    acetaminophen   Tablet .. 975 milliGRAM(s) Oral every 4 hours PRN  aspirin  chewable 81 milliGRAM(s) Oral daily  Biotene Dry Mouth Oral Rinse 5 milliLiter(s) Swish and Spit two times a day  buMETAnide Infusion 2 mG/Hr IV Continuous <Continuous>  calcitriol   Capsule 0.5 MICROGram(s) Oral daily  calcium acetate 2001 milliGRAM(s) Oral three times a day with meals  calcium carbonate 1250 mG  + Vitamin D (OsCal 500 + D) 2 Tablet(s) Oral three times a day  gabapentin 200 milliGRAM(s) Oral three times a day  guaiFENesin   Syrup  (Sugar-Free) 100 milliGRAM(s) Oral every 6 hours PRN  influenza   Vaccine 0.5 milliLiter(s) IntraMuscular once  insulin lispro (HumaLOG) corrective regimen sliding scale   SubCutaneous three times a day before meals  insulin lispro (HumaLOG) corrective regimen sliding scale   SubCutaneous at bedtime  levothyroxine 175 MICROGram(s) Oral daily  milrinone Infusion 0.375 MICROgram(s)/kG/Min IV Continuous <Continuous>  potassium chloride  20 mEq/100 mL IVPB 20 milliEquivalent(s) IV Intermittent every 2 hours  rifaximin 550 milliGRAM(s) Oral two times a day  spironolactone 50 milliGRAM(s) Oral two times a day                            10.2   7.05  )-----------( 169      ( 18 Nov 2018 08:11 )             31.5       11-19    133<L>  |  86<L>  |  19  ----------------------------<  144<H>  3.0<L>   |  32<H>  |  0.93    Ca    7.7<L>      19 Nov 2018 02:23  Mg     1.6     11-19      T(C): 36.5 (11-19-18 @ 04:26), Max: 36.8 (11-18-18 @ 09:40)  HR: 99 (11-19-18 @ 04:26) (99 - 113)  BP: 114/81 (11-19-18 @ 04:26) (104/73 - 114/81)  RR: 18 (11-19-18 @ 04:26) (18 - 19)  SpO2: 98% (11-19-18 @ 04:26) (95% - 98%)  Wt(kg): --    JVP 16  tachy, no murmurs  lungs with rales  soft nt/nd  + 2 edema    device interrogation normal  EKG: NSR, Biv pacing    A/P) She is a pleasant 64 y/o female PMH severe NICM (LVEF 10-15%) who had a [employer] Biv-ICD implanted at [place]. A recent LHC at [place] was also unremarkable. She now returns for with a severe decompensation of her NICM. Her device interrogation demonstrates excellent RA, RV and LV lead function. She denies palpitations nor high voltage therapy.     -continue bumex and milrinone as patient is still grossly volume overloaded  -f/u CHF consult for advanced therapies (VAD vs transplant)  -no need for AAD  -Her biv-icd was recently optimized by me. Future options including turning off BiV pacing to see if she responds better. This can be addressed when she's euvolemic  -f/u with [facility] after discharge

## 2018-11-19 NOTE — PROGRESS NOTE ADULT - SUBJECTIVE AND OBJECTIVE BOX
No pain, no shortness of breath      VITAL:  T(C): , Max: 36.8 (11-18-18 @ 09:40)  T(F): , Max: 98.3 (11-18-18 @ 09:40)  HR: 99 (11-19-18 @ 04:26)  BP: 114/81 (11-19-18 @ 04:26)  RR: 18 (11-19-18 @ 04:26)  SpO2: 98% (11-19-18 @ 04:26)  Urine output 3750cc/24h      PHYSICAL EXAM:  Constitutional: NAD, Alert  HEENT: NCAT, MMM  Neck: Supple, (+) JVD  Respiratory: CTA b/l  Cardiovascular: reg, tachy s1s2  Gastrointestinal: BS+, soft, NT, (+)mild distension  Extremities: 2+ b/l LE edema  Neurological: no focal deficits; strength grossly intact  Back: no CVAT b/l  Skin: No rashes, no nevi      LABS:                        10.2   7.05  )-----------( 169      ( 18 Nov 2018 08:11 )             31.5     Na(133)/K(3.0)/Cl(86)/HCO3(32)/BUN(19)/Cr(0.93)Glu(144)/Ca(7.7)/Mg(1.6)/PO4(--)    11-19 @ 02:23  Na(133)/K(4.3)/Cl(86)/HCO3(30)/BUN(18)/Cr(0.97)Glu(141)/Ca(7.6)/Mg(--)/PO4(--)    11-18 @ 14:48  Na(132)/K(3.8)/Cl(84)/HCO3(31)/BUN(15)/Cr(0.81)Glu(154)/Ca(7.7)/Mg(1.8)/PO4(--)    11-17 @ 18:52  Na(132)/K(3.5)/Cl(86)/HCO3(32)/BUN(14)/Cr(0.81)Glu(157)/Ca(7.5)/Mg(1.6)/PO4(4.0)    11-17 @ 07:16  Na(134)/K(3.1)/Cl(85)/HCO3(31)/BUN(14)/Cr(0.80)Glu(178)/Ca(7.3)/Mg(1.5)/PO4(--)    11-16 @ 21:05      ASSESSMENT: 63F w/ HTN, DM2, thyroid CA s/p resection, acquired hypoparathyroidism, and severe HFrEF, 11/6/18 a/w vaginal bleeding/MAGNUS/electrolyte derangements/ acute on chronic HFrEF    (1)Renal - MAGNUS - Prerenally mediated from decompensated CHF. Now resolved; renal fxn remains stable as of late    (2)Hypokalemia -due to diuresis (in association with bumex/primacor/spironolactone). Being repleted.    (3)Hypomagnesemia - in association with diuresis; ordered for repletion today    (4)Bone - hypocalcemia/hyperphosphatemia due to hypoparathyroidism (s/p iatrogenic parathyroidectomy). Very chronic. Acceptable for now, on oscal, calcitriol, and phoslo.    (5)CV - diuresing well on bumex, primacor, and spironolactone        RECOMMEND:  (1)Bumex/primacor/spironolactone as ordered  (2)Electrolyte repletion as ordered  (3)BMP + Mg + PO4 daily  (4)Dose new meds for GFR 50-60ml/min (present dosing is acceptable)          Brian Henry MD  [facility],   [phone] No pain, no shortness of breath      VITAL:  T(C): , Max: 36.8 (11-18-18 @ 09:40)  T(F): , Max: 98.3 (11-18-18 @ 09:40)  HR: 99 (11-19-18 @ 04:26)  BP: 114/81 (11-19-18 @ 04:26)  RR: 18 (11-19-18 @ 04:26)  SpO2: 98% (11-19-18 @ 04:26)  Urine output 3750cc/24h      PHYSICAL EXAM:  Constitutional: NAD, Alert  HEENT: NCAT, MMM  Neck: Supple, (+) JVD  Respiratory: CTA b/l  Cardiovascular: reg, s1s2  Gastrointestinal: BS+, soft, NT, (+)mild distension  Extremities: 2+ b/l LE edema  Neurological: no focal deficits; strength grossly intact  Back: no CVAT b/l  Skin: No rashes, no nevi      LABS:                        10.2   7.05  )-----------( 169      ( 18 Nov 2018 08:11 )             31.5     Na(133)/K(3.0)/Cl(86)/HCO3(32)/BUN(19)/Cr(0.93)Glu(144)/Ca(7.7)/Mg(1.6)/PO4(--)    11-19 @ 02:23  Na(133)/K(4.3)/Cl(86)/HCO3(30)/BUN(18)/Cr(0.97)Glu(141)/Ca(7.6)/Mg(--)/PO4(--)    11-18 @ 14:48  Na(132)/K(3.8)/Cl(84)/HCO3(31)/BUN(15)/Cr(0.81)Glu(154)/Ca(7.7)/Mg(1.8)/PO4(--)    11-17 @ 18:52  Na(132)/K(3.5)/Cl(86)/HCO3(32)/BUN(14)/Cr(0.81)Glu(157)/Ca(7.5)/Mg(1.6)/PO4(4.0)    11-17 @ 07:16  Na(134)/K(3.1)/Cl(85)/HCO3(31)/BUN(14)/Cr(0.80)Glu(178)/Ca(7.3)/Mg(1.5)/PO4(--)    11-16 @ 21:05      ASSESSMENT: 63F w/ HTN, DM2, thyroid CA s/p resection, acquired hypoparathyroidism, and severe HFrEF, 11/6/18 a/w vaginal bleeding/MAGNUS/electrolyte derangements/ acute on chronic HFrEF    (1)Renal - MAGNUS - Prerenally mediated from decompensated CHF. Now resolved; renal fxn remains stable as of late    (2)Hypokalemia -due to diuresis (in association with bumex/primacor/spironolactone). Being repleted.    (3)Hypomagnesemia - in association with diuresis; ordered for repletion today    (4)Bone - hypocalcemia/hyperphosphatemia due to hypoparathyroidism (s/p iatrogenic parathyroidectomy). Very chronic. Acceptable for now, on oscal, calcitriol, and phoslo.    (5)CV - diuresing well on bumex, primacor, and spironolactone        RECOMMEND:  (1)Bumex/primacor/spironolactone as ordered  (2)Electrolyte repletion as ordered  (3)BMP + Mg + PO4 daily  (4)Dose new meds for GFR 50-60ml/min (present dosing is acceptable)          Brian Henry MD  [facility]  [phone]

## 2018-11-19 NOTE — PROGRESS NOTE ADULT - PROBLEM SELECTOR PLAN 1
- continue Milrinone to 0.375   - continue Bumetanide to 2 mg/hr  - will plan to switch to bumex 3 mg PO BID tomorrow  - needs PICC placed for home milrinone  - agree with increased Spironolactone frequency   - please check BMP/Mg Q12H while on diuretic infusion   discussed the possible need for advanced therapies and encouraged outpatient follow up in HF Clinic (appointments have been scheduled)

## 2018-11-19 NOTE — PROGRESS NOTE ADULT - SUBJECTIVE AND OBJECTIVE BOX
Interval Events: Pt seen and examined. She denies abdominal pain, N/V.  Tolerating PO.     MEDICATIONS  (STANDING):  aspirin  chewable 81 milliGRAM(s) Oral daily  Biotene Dry Mouth Oral Rinse 5 milliLiter(s) Swish and Spit two times a day  buMETAnide Infusion 2 mG/Hr (10 mL/Hr) IV Continuous <Continuous>  calcitriol   Capsule 0.5 MICROGram(s) Oral daily  calcium acetate 2001 milliGRAM(s) Oral three times a day with meals  calcium carbonate 1250 mG  + Vitamin D (OsCal 500 + D) 2 Tablet(s) Oral three times a day  gabapentin 200 milliGRAM(s) Oral three times a day  influenza   Vaccine 0.5 milliLiter(s) IntraMuscular once  insulin lispro (HumaLOG) corrective regimen sliding scale   SubCutaneous three times a day before meals  insulin lispro (HumaLOG) corrective regimen sliding scale   SubCutaneous at bedtime  levothyroxine 175 MICROGram(s) Oral daily  milrinone Infusion 0.375 MICROgram(s)/kG/Min (10.89 mL/Hr) IV Continuous <Continuous>  potassium chloride  20 mEq/100 mL IVPB 20 milliEquivalent(s) IV Intermittent every 2 hours  rifaximin 550 milliGRAM(s) Oral two times a day  spironolactone 50 milliGRAM(s) Oral two times a day    MEDICATIONS  (PRN):  acetaminophen   Tablet .. 975 milliGRAM(s) Oral every 4 hours PRN Mild Pain (1 - 3)  guaiFENesin   Syrup  (Sugar-Free) 100 milliGRAM(s) Oral every 6 hours PRN Cough      Allergies    Isordil (Headache)  penicillin (Rash)    Intolerances        Review of Systems:    General:  No wt loss, fevers, chills, night sweats,fatigue,   Eyes:  Good vision, no reported pain  ENT:  No sore throat, pain, runny nose, dysphagia  CV:  No pain, palpitations, hypo/hypertension  Resp:  No dyspnea, cough, tachypnea, wheezing  GI:  No pain, No nausea, No vomiting, No diarrhea, No constipation, No weight loss, No fever, No pruritis, No rectal bleeding, No melena, No dysphagia  :  No pain, bleeding, incontinence, nocturia  Muscle:  No pain, weakness  Neuro:  No weakness, tingling, memory problems  Psych:  No fatigue, insomnia, mood problems, depression  Endocrine:  No polyuria, polydypsia, cold/heat intolerance  Heme:  No petechiae, ecchymosis, easy bruisability  Skin:  No rash, tattoos, scars, edema      Vital Signs Last 24 Hrs  T(C): 36.5 (19 Nov 2018 04:26), Max: 36.8 (18 Nov 2018 09:40)  T(F): 97.7 (19 Nov 2018 04:26), Max: 98.3 (18 Nov 2018 09:40)  HR: 99 (19 Nov 2018 04:26) (99 - 113)  BP: 114/81 (19 Nov 2018 04:26) (104/73 - 114/81)  BP(mean): --  RR: 18 (19 Nov 2018 04:26) (18 - 19)  SpO2: 98% (19 Nov 2018 04:26) (95% - 98%)    PHYSICAL EXAM:    Constitutional: NAD, well-developed  HEENT: EOMI, throat clear  Neck: No LAD, supple  Respiratory: CTA and P  Cardiovascular: S1 and S2, RRR, no M  Gastrointestinal: BS+, soft, NT/ND, neg HSM,  Extremities: No peripheral edema, neg clubing, cyanosis  Vascular: 2+ peripheral pulses  Neurological: A/O x 3, no focal deficits  Psychiatric: Normal mood, normal affect  Skin: No rashes      LABS:                        9.9    5.88  )-----------( 211      ( 19 Nov 2018 09:05 )             31.3     11-19    133<L>  |  86<L>  |  19  ----------------------------<  144<H>  3.0<L>   |  32<H>  |  0.93    Ca    7.7<L>      19 Nov 2018 02:23  Mg     1.6     11-19      PT/INR - ( 18 Nov 2018 08:15 )   PT: 25.9 sec;   INR: 2.22 ratio               RADIOLOGY & ADDITIONAL TESTS:

## 2018-11-19 NOTE — PROGRESS NOTE ADULT - ATTENDING COMMENTS
Agree with above.   -monitor I / O   -no plans for ischemic evaluation at this time  -follow up heart failure    Kunal Muller MD

## 2018-11-19 NOTE — PROGRESS NOTE ADULT - SUBJECTIVE AND OBJECTIVE BOX
Cardiology Progress Note    Interval events: No complaints. Wants to be discharged for Thanksgiving dinner. Discussed at length milrinone infusion and prognosis.    Tele: sinus v paced , pAT 150 for 3.8 seconds    Medications:  acetaminophen   Tablet .. 975 milliGRAM(s) Oral every 4 hours PRN  aspirin  chewable 81 milliGRAM(s) Oral daily  Biotene Dry Mouth Oral Rinse 5 milliLiter(s) Swish and Spit two times a day  buMETAnide Infusion 2 mG/Hr IV Continuous <Continuous>  calcitriol   Capsule 0.5 MICROGram(s) Oral daily  calcium acetate 2001 milliGRAM(s) Oral three times a day with meals  calcium carbonate 1250 mG  + Vitamin D (OsCal 500 + D) 2 Tablet(s) Oral three times a day  gabapentin 200 milliGRAM(s) Oral three times a day  guaiFENesin   Syrup  (Sugar-Free) 100 milliGRAM(s) Oral every 6 hours PRN  influenza   Vaccine 0.5 milliLiter(s) IntraMuscular once  insulin lispro (HumaLOG) corrective regimen sliding scale   SubCutaneous three times a day before meals  insulin lispro (HumaLOG) corrective regimen sliding scale   SubCutaneous at bedtime  levothyroxine 175 MICROGram(s) Oral daily  milrinone Infusion 0.375 MICROgram(s)/kG/Min IV Continuous <Continuous>  pantoprazole    Tablet 40 milliGRAM(s) Oral before breakfast  rifaximin 550 milliGRAM(s) Oral two times a day  spironolactone 50 milliGRAM(s) Oral two times a day      Review of Systems:  Constitutional: [ ] Fever [ ] Chills [ ] Fatigue [ ] Weight change   HEENT: [ ] Blurred vision [ ] Eye Pain [ ] Headache [ ] Runny nose [ ] Sore Throat   Respiratory: [ ] Cough [ ] Wheezing [ ] Shortness of breath  Cardiovascular: [ ] Chest Pain [ ] Palpitations [ ] EDDY [ ] PND [ ] Orthopnea  Gastrointestinal: [ ] Abdominal Pain [ ] Diarrhea [ ] Constipation [ ] Hemorrhoids [ ] Nausea [ ] Vomiting  Genitourinary: [ ] Nocturia [ ] Dysuria [ ] Incontinence  Extremities: [ ] Swelling [ ] Joint Pain  Neurologic: [ ] Focal deficit [ ] Paresthesias [ ] Syncope  Lymphatic: [ ] Swelling [ ] Lymphadenopathy   Skin: [ ] Rash [ ] Ecchymoses [ ] Wounds [ ] Lesions  Psychiatry: [ ] Depression [ ] Suicidal/Homicidal Ideation [ ] Anxiety [ ] Sleep Disturbances  [x] 10 point review of systems is otherwise negative except as mentioned above            [ ]Unable to obtain    Vitals:  T(C): 36.7 (18 @ 17:40), Max: 36.7 (18 @ 11:36)  HR: 105 (18 @ 17:40) (99 - 108)  BP: 109/75 (18 @ 17:40) (104/73 - 114/84)  BP(mean): --  RR: 19 (18 @ 17:40) (18 - 19)  SpO2: 98% (18 @ 17:40) (97% - 98%)  Wt(kg): --  Daily     Daily Weight in k.5 (2018 09:48)  I&O's Summary    2018 07:  -  2018 07:00  --------------------------------------------------------  IN: 180 mL / OUT: 3750 mL / NET: -3570 mL    2018 07:01  -  2018 18:21  --------------------------------------------------------  IN: 480 mL / OUT: 3000 mL / NET: -2520 mL        Physical Exam:  NAD  PERRL, EOMI  Normal oral [place] NC/AT  S1, S2, RRR, No m/r/g appreciated, No edema, moderately elevated JVD  + crackles  Soft, Non-tender, Non-distended, BS+  No clubbing, No joint deformity   Non-focal  No lymphadenopathy  AAOx3, Mood & affect appropriate  No rashes, No ecchymoses, No cyanosis    Labs:                        9.9    5.88  )-----------( 211      ( 2018 09:05 )             31.3         133<L>  |  86<L>  |  19  ----------------------------<  144<H>  3.0<L>   |  32<H>  |  0.93    Ca    7.7<L>      2018 02:23  Mg     1.6           PT/INR - ( 2018 08:15 )   PT: 25.9 sec;   INR: 2.22 ratio      New results/imaging:

## 2018-11-19 NOTE — PROGRESS NOTE ADULT - ASSESSMENT
64yo post-menopausal F w/ pmh of CHF, thyroid ca, HTN, DM, daily ASA user,  c/o vaginal bleeding that she noticed this morning. Pt states she woke up feeling fatigued with abdominal pressure and that when she sat down, she noticed that she had bled through her underwear. Pt has not experienced any bleeding episodes like this in the past. she admits to normal urinary and bowel functions and denies any HA, dizziness, SOB, CP, N/V/D, constipation. Pt has no known uterine pathologies and has not seen a GYN in 15-20 years. Currently Pt reports fatigue and daughter states she has been in this state for the past couple weeks since she was discharged from hospital for CHF exacerbation.      Problem/Plan - 1:  ·  Problem: Acute on chronic systolic congestive heart failure.  Plan: Heart Failure team helping . On Bumex and Milrinone drip as well Spironolactone .  S/P PICC Line.     Problem/Plan - 2:  ·  Problem: Acute renal failure.  Plan: Creatinine better.  Renal following .     Problem/Plan - 3:  ·  Problem: Hyperkalemia/ Hypokalemia/ hypomagnesemia  .  Plan: Correcting.      Problem/Plan - 4:  ·  Problem: Hyponatremia.  Plan: Correcting . Management per renal.      Problem/Plan - 5:  ·  Problem:  Hypocalcemia.  Plan: Corrected.  Replacing IV and PO ..      Problem/Plan - 6:  Problem:  Jaundice, hepatocellular with abnormal LFT . Plan: Likely from CHF . Monitoring LFT . Management per GI.     Problem/Plan - 7:  ·  Problem: DM (diabetes mellitus).  Plan: SSI and Lantus .Sugars in good control.      Problem/Plan - 8:  ·  Problem: UTI .  Plan: S/P Abxs for 5 days .     Problem/Plan - 9:  ·  Problem: Hypothyroidism.  Plan: Synthroid home dose and free T4 pending.       Problem/Plan - 10:  Problem: Vaginal bleeding. Plan; GYn follow up pending and awaiting Endometrial BX.      Problem/Plan - 11:  ·  Problem: Coagulopathy .  Plan: Sec to Liver injury. INR up. Holding DVT prophylaxis as vaginal bleeding.       Problem/Plan - 12:  ·  Problem: Feet pain sec to Diabetic Neuropathy  .  Plan: Resolved . Increased  Neurontin. Normal  B12 and Folate as well TFT.      Problem/Plan - 13:  ·  Problem: Mod to Severe MR and Severe TR .  Plan: Structural Heart team following.      Problem/Plan - 14:  ·  Problem: Nausea with Vomiting .  Plan: PPI and Zofran . GI following.     Disposition : Patient wants to go home before thanks giving.

## 2018-11-20 LAB
ALBUMIN SERPL ELPH-MCNC: 3 G/DL — LOW (ref 3.3–5)
ALP SERPL-CCNC: 315 U/L — HIGH (ref 40–120)
ALT FLD-CCNC: 38 U/L — SIGNIFICANT CHANGE UP (ref 10–45)
ANION GAP SERPL CALC-SCNC: 17 MMOL/L — SIGNIFICANT CHANGE UP (ref 5–17)
ANION GAP SERPL CALC-SCNC: 17 MMOL/L — SIGNIFICANT CHANGE UP (ref 5–17)
AST SERPL-CCNC: 62 U/L — HIGH (ref 10–40)
BILIRUB DIRECT SERPL-MCNC: 3.8 MG/DL — HIGH (ref 0–0.2)
BILIRUB INDIRECT FLD-MCNC: 2.1 MG/DL — HIGH (ref 0.2–1)
BILIRUB SERPL-MCNC: 5.9 MG/DL — HIGH (ref 0.2–1.2)
BUN SERPL-MCNC: 15 MG/DL — SIGNIFICANT CHANGE UP (ref 7–23)
BUN SERPL-MCNC: 16 MG/DL — SIGNIFICANT CHANGE UP (ref 7–23)
CALCIUM SERPL-MCNC: 8.1 MG/DL — LOW (ref 8.4–10.5)
CALCIUM SERPL-MCNC: 8.7 MG/DL — SIGNIFICANT CHANGE UP (ref 8.4–10.5)
CHLORIDE SERPL-SCNC: 83 MMOL/L — LOW (ref 96–108)
CHLORIDE SERPL-SCNC: 84 MMOL/L — LOW (ref 96–108)
CO2 SERPL-SCNC: 31 MMOL/L — SIGNIFICANT CHANGE UP (ref 22–31)
CO2 SERPL-SCNC: 33 MMOL/L — HIGH (ref 22–31)
CREAT SERPL-MCNC: 0.86 MG/DL — SIGNIFICANT CHANGE UP (ref 0.5–1.3)
CREAT SERPL-MCNC: 0.93 MG/DL — SIGNIFICANT CHANGE UP (ref 0.5–1.3)
GLUCOSE BLDC GLUCOMTR-MCNC: 131 MG/DL — HIGH (ref 70–99)
GLUCOSE BLDC GLUCOMTR-MCNC: 134 MG/DL — HIGH (ref 70–99)
GLUCOSE BLDC GLUCOMTR-MCNC: 142 MG/DL — HIGH (ref 70–99)
GLUCOSE BLDC GLUCOMTR-MCNC: 228 MG/DL — HIGH (ref 70–99)
GLUCOSE SERPL-MCNC: 104 MG/DL — HIGH (ref 70–99)
GLUCOSE SERPL-MCNC: 239 MG/DL — HIGH (ref 70–99)
HCT VFR BLD CALC: 31.4 % — LOW (ref 34.5–45)
HGB BLD-MCNC: 10.1 G/DL — LOW (ref 11.5–15.5)
MAGNESIUM SERPL-MCNC: 1.5 MG/DL — LOW (ref 1.6–2.6)
MAGNESIUM SERPL-MCNC: 1.7 MG/DL — SIGNIFICANT CHANGE UP (ref 1.6–2.6)
MCHC RBC-ENTMCNC: 23 PG — LOW (ref 27–34)
MCHC RBC-ENTMCNC: 32.2 GM/DL — SIGNIFICANT CHANGE UP (ref 32–36)
MCV RBC AUTO: 71.5 FL — LOW (ref 80–100)
PHOSPHATE SERPL-MCNC: 4.3 MG/DL — SIGNIFICANT CHANGE UP (ref 2.5–4.5)
PLATELET # BLD AUTO: 268 K/UL — SIGNIFICANT CHANGE UP (ref 150–400)
POTASSIUM SERPL-MCNC: 3 MMOL/L — LOW (ref 3.5–5.3)
POTASSIUM SERPL-MCNC: 3.2 MMOL/L — LOW (ref 3.5–5.3)
POTASSIUM SERPL-SCNC: 3 MMOL/L — LOW (ref 3.5–5.3)
POTASSIUM SERPL-SCNC: 3.2 MMOL/L — LOW (ref 3.5–5.3)
PROT SERPL-MCNC: 7 G/DL — SIGNIFICANT CHANGE UP (ref 6–8.3)
RBC # BLD: 4.39 M/UL — SIGNIFICANT CHANGE UP (ref 3.8–5.2)
RBC # FLD: 26.2 % — HIGH (ref 10.3–14.5)
SODIUM SERPL-SCNC: 132 MMOL/L — LOW (ref 135–145)
SODIUM SERPL-SCNC: 133 MMOL/L — LOW (ref 135–145)
WBC # BLD: 6.87 K/UL — SIGNIFICANT CHANGE UP (ref 3.8–10.5)
WBC # FLD AUTO: 6.87 K/UL — SIGNIFICANT CHANGE UP (ref 3.8–10.5)

## 2018-11-20 RX ORDER — MAGNESIUM SULFATE 500 MG/ML
2 VIAL (ML) INJECTION ONCE
Qty: 0 | Refills: 0 | Status: COMPLETED | OUTPATIENT
Start: 2018-11-20 | End: 2018-11-20

## 2018-11-20 RX ORDER — MILRINONE LACTATE 1 MG/ML
0.38 INJECTION, SOLUTION INTRAVENOUS
Qty: 30 | Refills: 0 | OUTPATIENT
Start: 2018-11-20 | End: 2018-12-19

## 2018-11-20 RX ORDER — POTASSIUM CHLORIDE 20 MEQ
40 PACKET (EA) ORAL EVERY 4 HOURS
Qty: 0 | Refills: 0 | Status: COMPLETED | OUTPATIENT
Start: 2018-11-20 | End: 2018-11-20

## 2018-11-20 RX ORDER — POTASSIUM CHLORIDE 20 MEQ
40 PACKET (EA) ORAL EVERY 4 HOURS
Qty: 0 | Refills: 0 | Status: COMPLETED | OUTPATIENT
Start: 2018-11-20 | End: 2018-11-21

## 2018-11-20 RX ORDER — BUMETANIDE 0.25 MG/ML
3 INJECTION INTRAMUSCULAR; INTRAVENOUS
Qty: 0 | Refills: 0 | Status: DISCONTINUED | OUTPATIENT
Start: 2018-11-20 | End: 2018-11-23

## 2018-11-20 RX ADMIN — Medication 5 MILLILITER(S): at 17:28

## 2018-11-20 RX ADMIN — Medication 40 MILLIEQUIVALENT(S): at 21:55

## 2018-11-20 RX ADMIN — Medication 2 TABLET(S): at 13:17

## 2018-11-20 RX ADMIN — Medication 2001 MILLIGRAM(S): at 12:02

## 2018-11-20 RX ADMIN — Medication 2 TABLET(S): at 21:55

## 2018-11-20 RX ADMIN — Medication 2 TABLET(S): at 05:59

## 2018-11-20 RX ADMIN — Medication 2001 MILLIGRAM(S): at 08:12

## 2018-11-20 RX ADMIN — Medication 50 GRAM(S): at 20:25

## 2018-11-20 RX ADMIN — GABAPENTIN 200 MILLIGRAM(S): 400 CAPSULE ORAL at 05:59

## 2018-11-20 RX ADMIN — Medication 40 MILLIEQUIVALENT(S): at 13:16

## 2018-11-20 RX ADMIN — Medication 2: at 17:27

## 2018-11-20 RX ADMIN — Medication 2001 MILLIGRAM(S): at 17:28

## 2018-11-20 RX ADMIN — SPIRONOLACTONE 50 MILLIGRAM(S): 25 TABLET, FILM COATED ORAL at 17:29

## 2018-11-20 RX ADMIN — Medication 50 GRAM(S): at 13:17

## 2018-11-20 RX ADMIN — GABAPENTIN 200 MILLIGRAM(S): 400 CAPSULE ORAL at 13:16

## 2018-11-20 RX ADMIN — MILRINONE LACTATE 10.89 MICROGRAM(S)/KG/MIN: 1 INJECTION, SOLUTION INTRAVENOUS at 09:25

## 2018-11-20 RX ADMIN — SPIRONOLACTONE 50 MILLIGRAM(S): 25 TABLET, FILM COATED ORAL at 06:06

## 2018-11-20 RX ADMIN — PANTOPRAZOLE SODIUM 40 MILLIGRAM(S): 20 TABLET, DELAYED RELEASE ORAL at 06:06

## 2018-11-20 RX ADMIN — Medication 5 MILLILITER(S): at 06:00

## 2018-11-20 RX ADMIN — Medication 40 MILLIEQUIVALENT(S): at 17:29

## 2018-11-20 RX ADMIN — CALCITRIOL 0.5 MICROGRAM(S): 0.5 CAPSULE ORAL at 12:02

## 2018-11-20 RX ADMIN — Medication 81 MILLIGRAM(S): at 12:02

## 2018-11-20 RX ADMIN — GABAPENTIN 200 MILLIGRAM(S): 400 CAPSULE ORAL at 21:55

## 2018-11-20 RX ADMIN — BUMETANIDE 10 MG/HR: 0.25 INJECTION INTRAMUSCULAR; INTRAVENOUS at 01:29

## 2018-11-20 RX ADMIN — BUMETANIDE 3 MILLIGRAM(S): 0.25 INJECTION INTRAMUSCULAR; INTRAVENOUS at 17:38

## 2018-11-20 RX ADMIN — Medication 175 MICROGRAM(S): at 05:59

## 2018-11-20 NOTE — PROGRESS NOTE ADULT - SUBJECTIVE AND OBJECTIVE BOX
pt seen and examined, no complaints, ROS - .     acetaminophen   Tablet .. 975 milliGRAM(s) Oral every 4 hours PRN  aspirin  chewable 81 milliGRAM(s) Oral daily  Biotene Dry Mouth Oral Rinse 5 milliLiter(s) Swish and Spit two times a day  buMETAnide Infusion 2 mG/Hr IV Continuous <Continuous>  calcitriol   Capsule 0.5 MICROGram(s) Oral daily  calcium acetate 2001 milliGRAM(s) Oral three times a day with meals  calcium carbonate 1250 mG  + Vitamin D (OsCal 500 + D) 2 Tablet(s) Oral three times a day  gabapentin 200 milliGRAM(s) Oral three times a day  guaiFENesin   Syrup  (Sugar-Free) 100 milliGRAM(s) Oral every 6 hours PRN  influenza   Vaccine 0.5 milliLiter(s) IntraMuscular once  insulin lispro (HumaLOG) corrective regimen sliding scale   SubCutaneous three times a day before meals  insulin lispro (HumaLOG) corrective regimen sliding scale   SubCutaneous at bedtime  levothyroxine 175 MICROGram(s) Oral daily  milrinone Infusion 0.375 MICROgram(s)/kG/Min IV Continuous <Continuous>  pantoprazole    Tablet 40 milliGRAM(s) Oral before breakfast  rifaximin 550 milliGRAM(s) Oral two times a day  spironolactone 50 milliGRAM(s) Oral two times a day                            9.9    5.88  )-----------( 211      ( 19 Nov 2018 09:05 )             31.3       Hemoglobin: 9.9 g/dL (11-19 @ 09:05)  Hemoglobin: 10.2 g/dL (11-18 @ 08:11)  Hemoglobin: 10.4 g/dL (11-17 @ 07:16)  Hemoglobin: 10.0 g/dL (11-16 @ 07:55)  Hemoglobin: 10.0 g/dL (11-15 @ 07:53)      11-20    133<L>  |  83<L>  |  15  ----------------------------<  104<H>  3.0<L>   |  33<H>  |  0.86    Ca    8.1<L>      20 Nov 2018 05:26  Phos  4.3     11-20  Mg     1.5     11-20    TPro  7.0  /  Alb  3.0<L>  /  TBili  5.9<H>  /  DBili  3.8<H>  /  AST  62<H>  /  ALT  38  /  AlkPhos  315<H>  11-20    Creatinine Trend: 0.86<--, 0.85<--, 0.93<--, 0.97<--, 0.91<--, 0.81<--    COAGS:           T(C): 36.6 (11-20-18 @ 04:04), Max: 36.9 (11-20-18 @ 00:00)  HR: 105 (11-20-18 @ 05:57) (96 - 109)  BP: 105/71 (11-20-18 @ 05:57) (94/56 - 114/84)  RR: 18 (11-20-18 @ 04:04) (18 - 19)  SpO2: 97% (11-20-18 @ 04:04) (94% - 98%)  Wt(kg): --    I&O's Summary    18 Nov 2018 07:01  -  19 Nov 2018 07:00  --------------------------------------------------------  IN: 180 mL / OUT: 3750 mL / NET: -3570 mL    19 Nov 2018 07:01  -  20 Nov 2018 06:18  --------------------------------------------------------  IN: 720 mL / OUT: 5850 mL / NET: -5130 mL      Lymphatic: No lymphadenopathy + edema in LE bilaterally   Cardiovascular: Normal S1 S2,RRR, No murmur   Respiratory: Lungs clear to auscultation, normal effort 	  Gastrointestinal:  Soft, distended   Skin: No rashes, No ecchymoses, No cyanosis,     DATA:    TELEMETRY: 	      	    < from: TTE with Doppler (w/Cont) (11.07.18 @ 05:53) >  Conclusions:  1. Tethered mitral valve leaflets with normal opening.  Mitral annular calcification and thickened  mitral leaflet  tips Moderate mitral regurgitation.  2. Calcified trileaflet aortic valve with normal opening.  3. Moderately dilated left atrium.  LA volume index = 43  cc/m2.  4. Eccentric left ventricular hypertrophy (dilated left  ventricle with normal relative wall thickness).  5. Severe global left ventricular systolic dysfunction.  Endocardial visualization enhanced with intravenous  injection of Ultrasonic Enhancing Agent (Definity). No LV  thrombus.  Septal flattening consistent with right  ventricular overload.  6. Severe diastolic dysfunction with elevated filling  pressures  7. Severe right atrial enlargement.  8. Right ventricular enlargement with decreased right  ventricular systolic function.  A device wire is noted in  the right heart.  9. Dilated tricuspid annulus with malcoaptation of  tricuspid leaflets. Severe tricuspid regurgitation.  *** No previous Echo exam.  ------------------------------------------------------------------------  Confirmed on  11/7/2018 - 16:40:23 by Haley Koch M.D.    < end of copied text >      < from: Cardiac Cath Lab - Adult (05.25.18 @ 11:46) >  DIAGNOSTIC RECOMMENDATIONS: Right heart catheterization demonstrates  elevated PCWP and low cardiac output. Will transfer patient to the CCU for  inotrope assisted diuresis.  Prepared and signed by  Kunal Muller M.D.  Signed 05/29/2018 16:07:27    < end of copied text >      ASSESSMENT/PLAN: 64 y/o F with PMHx of NICM s/p AICD, HTN, moderate-severe MR, reportedly recent LHC at [place] was negative, thyroid cancer s/p thyroidectomy, DM,  admitted with decompensated acute on chronic systolic heart failure.    -continue with inotrope ( primacor)  assisted diuresis per heart failure  - cont Bumex , changing to PO today   -continue with IV diuresis  -follow up EP re: NSVT  -heart failure follow up   -no further interventional workup or ischemic evaluation needed at this time  D/W Dr Muller pt seen and examined, no complaints, ROS - .     acetaminophen   Tablet .. 975 milliGRAM(s) Oral every 4 hours PRN  aspirin  chewable 81 milliGRAM(s) Oral daily  Biotene Dry Mouth Oral Rinse 5 milliLiter(s) Swish and Spit two times a day  buMETAnide Infusion 2 mG/Hr IV Continuous <Continuous>  calcitriol   Capsule 0.5 MICROGram(s) Oral daily  calcium acetate 2001 milliGRAM(s) Oral three times a day with meals  calcium carbonate 1250 mG  + Vitamin D (OsCal 500 + D) 2 Tablet(s) Oral three times a day  gabapentin 200 milliGRAM(s) Oral three times a day  guaiFENesin   Syrup  (Sugar-Free) 100 milliGRAM(s) Oral every 6 hours PRN  influenza   Vaccine 0.5 milliLiter(s) IntraMuscular once  insulin lispro (HumaLOG) corrective regimen sliding scale   SubCutaneous three times a day before meals  insulin lispro (HumaLOG) corrective regimen sliding scale   SubCutaneous at bedtime  levothyroxine 175 MICROGram(s) Oral daily  milrinone Infusion 0.375 MICROgram(s)/kG/Min IV Continuous <Continuous>  pantoprazole    Tablet 40 milliGRAM(s) Oral before breakfast  rifaximin 550 milliGRAM(s) Oral two times a day  spironolactone 50 milliGRAM(s) Oral two times a day                            9.9    5.88  )-----------( 211      ( 19 Nov 2018 09:05 )             31.3       Hemoglobin: 9.9 g/dL (11-19 @ 09:05)  Hemoglobin: 10.2 g/dL (11-18 @ 08:11)  Hemoglobin: 10.4 g/dL (11-17 @ 07:16)  Hemoglobin: 10.0 g/dL (11-16 @ 07:55)  Hemoglobin: 10.0 g/dL (11-15 @ 07:53)      11-20    133<L>  |  83<L>  |  15  ----------------------------<  104<H>  3.0<L>   |  33<H>  |  0.86    Ca    8.1<L>      20 Nov 2018 05:26  Phos  4.3     11-20  Mg     1.5     11-20    TPro  7.0  /  Alb  3.0<L>  /  TBili  5.9<H>  /  DBili  3.8<H>  /  AST  62<H>  /  ALT  38  /  AlkPhos  315<H>  11-20    Creatinine Trend: 0.86<--, 0.85<--, 0.93<--, 0.97<--, 0.91<--, 0.81<--    COAGS:           T(C): 36.6 (11-20-18 @ 04:04), Max: 36.9 (11-20-18 @ 00:00)  HR: 105 (11-20-18 @ 05:57) (96 - 109)  BP: 105/71 (11-20-18 @ 05:57) (94/56 - 114/84)  RR: 18 (11-20-18 @ 04:04) (18 - 19)  SpO2: 97% (11-20-18 @ 04:04) (94% - 98%)  Wt(kg): --    I&O's Summary    18 Nov 2018 07:01  -  19 Nov 2018 07:00  --------------------------------------------------------  IN: 180 mL / OUT: 3750 mL / NET: -3570 mL    19 Nov 2018 07:01  -  20 Nov 2018 06:18  --------------------------------------------------------  IN: 720 mL / OUT: 5850 mL / NET: -5130 mL      Lymphatic: No lymphadenopathy + edema in LE bilaterally   Cardiovascular: Normal S1 S2,RRR, No murmur   Respiratory: Lungs clear to auscultation, normal effort 	  Gastrointestinal:  Soft, distended   Skin: No rashes, No ecchymoses, No cyanosis,     DATA:    TELEMETRY: 	      	    < from: TTE with Doppler (w/Cont) (11.07.18 @ 05:53) >  Conclusions:  1. Tethered mitral valve leaflets with normal opening.  Mitral annular calcification and thickened  mitral leaflet  tips Moderate mitral regurgitation.  2. Calcified trileaflet aortic valve with normal opening.  3. Moderately dilated left atrium.  LA volume index = 43  cc/m2.  4. Eccentric left ventricular hypertrophy (dilated left  ventricle with normal relative wall thickness).  5. Severe global left ventricular systolic dysfunction.  Endocardial visualization enhanced with intravenous  injection of Ultrasonic Enhancing Agent (Definity). No LV  thrombus.  Septal flattening consistent with right  ventricular overload.  6. Severe diastolic dysfunction with elevated filling  pressures  7. Severe right atrial enlargement.  8. Right ventricular enlargement with decreased right  ventricular systolic function.  A device wire is noted in  the right heart.  9. Dilated tricuspid annulus with malcoaptation of  tricuspid leaflets. Severe tricuspid regurgitation.  *** No previous Echo exam.  ------------------------------------------------------------------------  Confirmed on  11/7/2018 - 16:40:23 by Haley Koch M.D.    < end of copied text >      < from: Cardiac Cath Lab - Adult (05.25.18 @ 11:46) >  DIAGNOSTIC RECOMMENDATIONS: Right heart catheterization demonstrates  elevated PCWP and low cardiac output. Will transfer patient to the CCU for  inotrope assisted diuresis.  Prepared and signed by  Kunal Muller M.D.  Signed 05/29/2018 16:07:27    < end of copied text >      ASSESSMENT/PLAN: 64 y/o F with PMHx of NICM s/p AICD, HTN, moderate-severe MR, reportedly recent LHC at [place] was negative, thyroid cancer s/p thyroidectomy, DM,  admitted with decompensated acute on chronic systolic heart failure.    -continue with inotrope ( primacor)  assisted diuresis per heart failure  - cont Bumex , changing to PO today   -continue with IV diuresis  -follow up EP re: NSVT  D/W Dr Grimes

## 2018-11-20 NOTE — PROGRESS NOTE ADULT - PROBLEM SELECTOR PLAN 5
she has been treated empirically for 11 days, which appears to be adequate given lack of symptoms, would recommend stopping Abx she has been treated empirically for 11 days

## 2018-11-20 NOTE — PROGRESS NOTE ADULT - PROBLEM SELECTOR PROBLEM 5
UTI (urinary tract infection)
R/O Hypocalcemia
UTI (urinary tract infection)

## 2018-11-20 NOTE — CHART NOTE - NSCHARTNOTESELECT_GEN_ALL_CORE
Nutrition Services
CCU MN Rounds/Event Note
CCU Transfer/Transfer Note
Event Note
Event Note/Endocrinology
Event Note/NSVT
Event Note/Procedure Note
Event Note/mn rounds
Transfer Note

## 2018-11-20 NOTE — PROGRESS NOTE ADULT - ATTENDING COMMENTS
Ms Carson here with heart failure  Active issues  ADHF  NICM, EF 20%, LVEDD 6.5cm, s/p CRT , stage D now inotrope depedent  m-sMR, STR  HTN, DM  Encephalopathy resolved    picc in place, switch bumex to oral  - switch to bumex 3mg po bid  - home with milrinone  - dispo planning  - very poor prognosis due to poor insight and poor compliance  - needs follow up appt with us in 1 week NP clinic

## 2018-11-20 NOTE — PROGRESS NOTE ADULT - SUBJECTIVE AND OBJECTIVE BOX
Patient seen and examined in bed; no new events overnight.  NAD.    REVIEW OF SYSTEMS:  As per HPI, otherwise 8 full 10 ROS were unremarkable    MEDICATIONS  (STANDING):  aspirin  chewable 81 milliGRAM(s) Oral daily  Biotene Dry Mouth Oral Rinse 5 milliLiter(s) Swish and Spit two times a day  buMETAnide Infusion 2 mG/Hr (10 mL/Hr) IV Continuous <Continuous>  calcitriol   Capsule 0.5 MICROGram(s) Oral daily  calcium acetate 2001 milliGRAM(s) Oral three times a day with meals  calcium carbonate 1250 mG  + Vitamin D (OsCal 500 + D) 2 Tablet(s) Oral three times a day  gabapentin 200 milliGRAM(s) Oral three times a day  influenza   Vaccine 0.5 milliLiter(s) IntraMuscular once  insulin lispro (HumaLOG) corrective regimen sliding scale   SubCutaneous three times a day before meals  insulin lispro (HumaLOG) corrective regimen sliding scale   SubCutaneous at bedtime  levothyroxine 175 MICROGram(s) Oral daily  milrinone Infusion 0.375 MICROgram(s)/kG/Min (10.89 mL/Hr) IV Continuous <Continuous>  pantoprazole    Tablet 40 milliGRAM(s) Oral before breakfast  rifaximin 550 milliGRAM(s) Oral two times a day  spironolactone 50 milliGRAM(s) Oral two times a day      VITAL:  T(C): , Max: 36.9 (11-20-18 @ 00:00)  T(F): , Max: 98.5 (11-20-18 @ 00:00)  HR: 99 (11-20-18 @ 12:00)  BP: 101/68 (11-20-18 @ 12:00)  BP(mean): --  RR: 18 (11-20-18 @ 12:00)  SpO2: 95% (11-20-18 @ 12:00)  Wt(kg): --    I and O's:    11-19 @ 07:01  -  11-20 @ 07:00  --------------------------------------------------------  IN: 1090.8 mL / OUT: 6850 mL / NET: -5759.2 mL          PHYSICAL EXAM:    Constitutional: NAD, Alert  HEENT: NCAT, MMM  Neck: Supple, (+) JVD  Respiratory: CTA b/l  Cardiovascular: reg, s1s2  Gastrointestinal: BS+, soft, NT, (+)mild distension  Extremities: 2+ b/l LE edema  Neurological: no focal deficits; strength grossly intact  Back: no CVAT b/l  Skin: No rashes, no nevi        LABS:                        10.1   6.87  )-----------( 268      ( 20 Nov 2018 07:49 )             31.4     11-20    133<L>  |  83<L>  |  15  ----------------------------<  104<H>  3.0<L>   |  33<H>  |  0.86    Ca    8.1<L>      20 Nov 2018 05:26  Phos  4.3     11-20  Mg     1.5     11-20    TPro  7.0  /  Alb  3.0<L>  /  TBili  5.9<H>  /  DBili  3.8<H>  /  AST  62<H>  /  ALT  38  /  AlkPhos  315<H>  11-20      ASSESSMENT: 63F w/ HTN, DM2, thyroid CA s/p resection, acquired hypoparathyroidism, and severe HFrEF, 11/6/18 a/w vaginal bleeding/MAGNUS/electrolyte derangements/ acute on chronic HFrEF    (1)Renal - MAGNUS - Prerenally mediated from decompensated CHF. Now resolved; renal fxn remains stable     (2)Hypokalemia -due to diuresis (in association with bumex/primacor/spironolactone). Low today; needs repletion     (3)Hypomagnesemia - in association with diuresis; will order for repletion     (4)Bone - hypocalcemia/hyperphosphatemia due to hypoparathyroidism (s/p iatrogenic parathyroidectomy). Very chronic. Acceptable for now, on oscal, calcitriol, and phoslo.    (5)CV - diuresing well on bumex, primacor, and spironolactone      RECOMMEND:  - primacor/spironolactone as ordered  - No objection to continue Bumex gtt as ordered for now; plan to likely transition to PO tomorrow as per CHF  - KCL 40 meq PO x 2 doses  - Magnesium sulfate 2 G IV x 1 dose  - Monitor I & Os  - Plan for PICC placement for home milrinone   - Dose new meds for GFR 50-60ml/min (present dosing is acceptable) Patient seen and examined in bed; no new events overnight.  NAD.    REVIEW OF SYSTEMS:  As per HPI, otherwise 8 full 10 ROS were unremarkable    MEDICATIONS  (STANDING):  aspirin  chewable 81 milliGRAM(s) Oral daily  Biotene Dry Mouth Oral Rinse 5 milliLiter(s) Swish and Spit two times a day  buMETAnide Infusion 2 mG/Hr (10 mL/Hr) IV Continuous <Continuous>  calcitriol   Capsule 0.5 MICROGram(s) Oral daily  calcium acetate 2001 milliGRAM(s) Oral three times a day with meals  calcium carbonate 1250 mG  + Vitamin D (OsCal 500 + D) 2 Tablet(s) Oral three times a day  gabapentin 200 milliGRAM(s) Oral three times a day  influenza   Vaccine 0.5 milliLiter(s) IntraMuscular once  insulin lispro (HumaLOG) corrective regimen sliding scale   SubCutaneous three times a day before meals  insulin lispro (HumaLOG) corrective regimen sliding scale   SubCutaneous at bedtime  levothyroxine 175 MICROGram(s) Oral daily  milrinone Infusion 0.375 MICROgram(s)/kG/Min (10.89 mL/Hr) IV Continuous <Continuous>  pantoprazole    Tablet 40 milliGRAM(s) Oral before breakfast  rifaximin 550 milliGRAM(s) Oral two times a day  spironolactone 50 milliGRAM(s) Oral two times a day      VITAL:  T(C): , Max: 36.9 (11-20-18 @ 00:00)  T(F): , Max: 98.5 (11-20-18 @ 00:00)  HR: 99 (11-20-18 @ 12:00)  BP: 101/68 (11-20-18 @ 12:00)  BP(mean): --  RR: 18 (11-20-18 @ 12:00)  SpO2: 95% (11-20-18 @ 12:00)  Wt(kg): --    I and O's:    11-19 @ 07:01  -  11-20 @ 07:00  --------------------------------------------------------  IN: 1090.8 mL / OUT: 6850 mL / NET: -5759.2 mL          PHYSICAL EXAM:    Constitutional: NAD, Alert  HEENT: NCAT, MMM  Neck: Supple, (+) JVD  Respiratory: CTA b/l  Cardiovascular: reg, s1s2  Gastrointestinal: BS+, soft, NT, (+)mild distension  Extremities: 2+ b/l LE edema  Neurological: no focal deficits; strength grossly intact  Back: no CVAT b/l  Skin: No rashes, no nevi        LABS:                        10.1   6.87  )-----------( 268      ( 20 Nov 2018 07:49 )             31.4     11-20    133<L>  |  83<L>  |  15  ----------------------------<  104<H>  3.0<L>   |  33<H>  |  0.86    Ca    8.1<L>      20 Nov 2018 05:26  Phos  4.3     11-20  Mg     1.5     11-20    TPro  7.0  /  Alb  3.0<L>  /  TBili  5.9<H>  /  DBili  3.8<H>  /  AST  62<H>  /  ALT  38  /  AlkPhos  315<H>  11-20      ASSESSMENT: 63F w/ HTN, DM2, thyroid CA s/p resection, acquired hypoparathyroidism, and severe HFrEF, 11/6/18 a/w vaginal bleeding/MAGNUS/electrolyte derangements/ acute on chronic HFrEF    (1)Renal - MAGNUS - Prerenally mediated from decompensated CHF. Now resolved; renal fxn remains stable     (2)Hypokalemia -due to diuresis (in association with bumex/primacor/spironolactone). Low today; needs repletion     (3)Hypomagnesemia - in association with diuresis; will order for repletion     (4)Bone - hypocalcemia/hyperphosphatemia due to hypoparathyroidism (s/p iatrogenic parathyroidectomy). Very chronic. Acceptable for now, on oscal, calcitriol, and phoslo.    (5)CV - diuresing well on bumex, primacor, and spironolactone      RECOMMEND:  - primacor/spironolactone as ordered  - No objection to continue Bumex gtt as ordered for now; plan to likely transition to PO tomorrow as per CHF  - KCL 40 meq PO x 2 doses  - Magnesium sulfate 2 G IV x 1 dose  - Monitor I & Os  - Plan to be discharged home with PICC with home milrinone   - Dose new meds for GFR 50-60ml/min (present dosing is acceptable)

## 2018-11-20 NOTE — CHART NOTE - NSCHARTNOTEFT_GEN_A_CORE
Nutrition follow up     Hospital course as per chart: Pt 62 y/o F with PMH: NICM S/P AICD, HTN, moderate-severe MR, DM, medication non-compliance who was admitted with volume overload and acute on chronic decomp CHF and vaginal bleeding, S/P PICC line for Milrinone, hypokalemia due to diuresis, hypomagnesemia, hypocalcemia, elevated liver indices.     Source: Patient [x]    Family [ ]     other [x]; Medical record    Pt reports good appetite and PO intake. Noted 70-80% PO intake as per flow sheets. Denies difficulty chewing/swallowing. Pt denies nausea, vomiting, diarrhea, or constipation, reports last BM yesterday (11/19). Noted pt received education on heart healthy nutrition therapy as per previous RD note, reviewed main points as per pt's request. Pt denies having further questions/concerns about diet and nutrition education provided.     Diet: Consistent Carbohydrate no snacks + 1000 ml fluid restriction + no concentrated phosphorus     Enteral /Parenteral Nutrition: n/a    Current Weight: (11/08) 213.4 pounds -> (11/10) 203.9 pounds -> (11/13) 200.8 pounds -> (11/16) 200.6 pounds -> (11/20) 181.6 pounds -?accuracy of weight fluctuations likely due to fluid shifts.   % Weight Change: n/a    Pertinent Medications: MEDICATIONS  (STANDING):  aspirin  chewable 81 milliGRAM(s) Oral daily  Biotene Dry Mouth Oral Rinse 5 milliLiter(s) Swish and Spit two times a day  buMETAnide 3 milliGRAM(s) Oral two times a day  calcitriol   Capsule 0.5 MICROGram(s) Oral daily  calcium acetate 2001 milliGRAM(s) Oral three times a day with meals  calcium carbonate 1250 mG  + Vitamin D (OsCal 500 + D) 2 Tablet(s) Oral three times a day  gabapentin 200 milliGRAM(s) Oral three times a day  influenza   Vaccine 0.5 milliLiter(s) IntraMuscular once  insulin lispro (HumaLOG) corrective regimen sliding scale   SubCutaneous three times a day before meals  insulin lispro (HumaLOG) corrective regimen sliding scale   SubCutaneous at bedtime  levothyroxine 175 MICROGram(s) Oral daily  milrinone Infusion 0.375 MICROgram(s)/kG/Min (10.89 mL/Hr) IV Continuous <Continuous>  pantoprazole    Tablet 40 milliGRAM(s) Oral before breakfast  potassium chloride    Tablet ER 40 milliEquivalent(s) Oral every 4 hours  rifaximin 550 milliGRAM(s) Oral two times a day  spironolactone 50 milliGRAM(s) Oral two times a day    MEDICATIONS  (PRN):  acetaminophen   Tablet .. 975 milliGRAM(s) Oral every 4 hours PRN Mild Pain (1 - 3)  guaiFENesin   Syrup  (Sugar-Free) 100 milliGRAM(s) Oral every 6 hours PRN Cough    Pertinent Labs: (11/20) Na133 mmol/L<L> Glu 104 mg/dL<H> K+ 3.0 mmol/L<L> Mg 1.5 <L>  Finger sticks: (11/20) 131-134 (11/19) 105-210  (11/08) [id number] 6.0 %    Skin: no noted pressure injuries as per documentation.   Noted +2 BLE edema as per flow sheets.     Estimated Needs:   [x] no change since previous assessment  [ ] recalculated:     Previous Nutrition Diagnosis: [x] Unintended weight gain     Nutrition Diagnosis is [x] ongoing - being addressed with fluid restriction and medications.     New Nutrition Diagnosis: [x] not applicable    Interventions:     1. Recommend change to Consistent Carbohydrate no snack + 1000 fluid restriction + DASH/TLC diet.   2. Encourage PO intake, obtain food preferences, provide feeding assistance as needed.   3. Reviewed education on heart failure and fluid restriction nutrition therapy as per pt's request. Discussed foods considered as fluids, reviewed foods high in salt and saturated fats, discussed foods recommended within therapeutic diet.   4. Continue K+, Mg, and Ca supplementation as appropriate to help attain normal serum levels.   5. Continue to obtain daily weights as pt with CHF and to identify changes if any.     Monitoring and Evaluation:     [x] PO intake [x] Tolerance to diet prescription [x] weights [x] follow up per protocol    RD remains available.  Ksenia Escobedo RDN

## 2018-11-20 NOTE — CHART NOTE - NSCHARTNOTEFT_GEN_A_CORE
Per Dr. Rojas's discussion with CHF team - Bumex drip discontinued and started on Bumex 3 mg PO two times a day    [id number]

## 2018-11-20 NOTE — PROGRESS NOTE ADULT - PROBLEM SELECTOR PLAN 4
likely due to renal venous congestion, improved with diuresis and inotropic support
likely due to renal venous congestion, improved with diuresis and support
Management per renal.
likely due to renal venous congestion, improved with diuresis and inotropic support
likely due to renal venous congestion, improved with diuresis and support

## 2018-11-20 NOTE — PROGRESS NOTE ADULT - ATTENDING COMMENTS
Agree with above assessment and plan as outlined above.    - Volume status improving  - Outpt f/u for possible advanced therapies    Augusto Grimes MD, FACC

## 2018-11-20 NOTE — PROGRESS NOTE ADULT - SUBJECTIVE AND OBJECTIVE BOX
EP ATTENDING    tele: NSR, biv pacing    no palpitations, no syncope, no angina    remains on bumex and milrinone drips    acetaminophen   Tablet .. 975 milliGRAM(s) Oral every 4 hours PRN  aspirin  chewable 81 milliGRAM(s) Oral daily  Biotene Dry Mouth Oral Rinse 5 milliLiter(s) Swish and Spit two times a day  buMETAnide Infusion 2 mG/Hr IV Continuous <Continuous>  calcitriol   Capsule 0.5 MICROGram(s) Oral daily  calcium acetate 2001 milliGRAM(s) Oral three times a day with meals  calcium carbonate 1250 mG  + Vitamin D (OsCal 500 + D) 2 Tablet(s) Oral three times a day  gabapentin 200 milliGRAM(s) Oral three times a day  guaiFENesin   Syrup  (Sugar-Free) 100 milliGRAM(s) Oral every 6 hours PRN  influenza   Vaccine 0.5 milliLiter(s) IntraMuscular once  insulin lispro (HumaLOG) corrective regimen sliding scale   SubCutaneous three times a day before meals  insulin lispro (HumaLOG) corrective regimen sliding scale   SubCutaneous at bedtime  levothyroxine 175 MICROGram(s) Oral daily  milrinone Infusion 0.375 MICROgram(s)/kG/Min IV Continuous <Continuous>  pantoprazole    Tablet 40 milliGRAM(s) Oral before breakfast  rifaximin 550 milliGRAM(s) Oral two times a day  spironolactone 50 milliGRAM(s) Oral two times a day                            10.1   6.87  )-----------( 268      ( 20 Nov 2018 07:49 )             31.4       11-20    133<L>  |  83<L>  |  15  ----------------------------<  104<H>  3.0<L>   |  33<H>  |  0.86    Ca    8.1<L>      20 Nov 2018 05:26  Phos  4.3     11-20  Mg     1.5     11-20    TPro  7.0  /  Alb  3.0<L>  /  TBili  5.9<H>  /  DBili  3.8<H>  /  AST  62<H>  /  ALT  38  /  AlkPhos  315<H>  11-20      T(C): 36.6 (11-20-18 @ 04:04), Max: 36.9 (11-20-18 @ 00:00)  HR: 105 (11-20-18 @ 05:57) (98 - 109)  BP: 105/71 (11-20-18 @ 05:57) (94/56 - 114/84)  RR: 18 (11-20-18 @ 04:04) (18 - 19)  SpO2: 97% (11-20-18 @ 04:04) (94% - 98%)  Wt(kg): --    JVP 12  tachy, no murmurs  lungs with rales  soft nt/nd  + 2 edema    device interrogation normal  EKG: NSR, Biv pacing    A/P) She is a pleasant 62 y/o female PMH severe NICM (LVEF 10-15%) who had a [employer] Biv-ICD implanted at [place]. A recent LHC at [place] was also unremarkable. She now returns for with a severe decompensation of her NICM. Her device interrogation demonstrates excellent RA, RV and LV lead function. She denies palpitations nor high voltage therapy.     -continue bumex and milrinone as patient is still grossly volume overloaded  -f/u CHF consult for advanced therapies (VAD vs transplant)  -no need for AAD  -Her biv-icd was recently optimized by me. Future options including turning off BiV pacing to see if she responds better. This can be addressed when she's euvolemic  -f/u with [facility] after discharge

## 2018-11-20 NOTE — PROGRESS NOTE ADULT - SUBJECTIVE AND OBJECTIVE BOX
Interval Events: Pt seen and examined. She denies abdominal pain, N/V.  Tolerating PO.     MEDICATIONS  (STANDING):  aspirin  chewable 81 milliGRAM(s) Oral daily  Biotene Dry Mouth Oral Rinse 5 milliLiter(s) Swish and Spit two times a day  buMETAnide Infusion 2 mG/Hr (10 mL/Hr) IV Continuous <Continuous>  calcitriol   Capsule 0.5 MICROGram(s) Oral daily  calcium acetate 2001 milliGRAM(s) Oral three times a day with meals  calcium carbonate 1250 mG  + Vitamin D (OsCal 500 + D) 2 Tablet(s) Oral three times a day  gabapentin 200 milliGRAM(s) Oral three times a day  influenza   Vaccine 0.5 milliLiter(s) IntraMuscular once  insulin lispro (HumaLOG) corrective regimen sliding scale   SubCutaneous three times a day before meals  insulin lispro (HumaLOG) corrective regimen sliding scale   SubCutaneous at bedtime  levothyroxine 175 MICROGram(s) Oral daily  milrinone Infusion 0.375 MICROgram(s)/kG/Min (10.89 mL/Hr) IV Continuous <Continuous>  pantoprazole    Tablet 40 milliGRAM(s) Oral before breakfast  rifaximin 550 milliGRAM(s) Oral two times a day  spironolactone 50 milliGRAM(s) Oral two times a day    MEDICATIONS  (PRN):  acetaminophen   Tablet .. 975 milliGRAM(s) Oral every 4 hours PRN Mild Pain (1 - 3)  guaiFENesin   Syrup  (Sugar-Free) 100 milliGRAM(s) Oral every 6 hours PRN Cough      Allergies    Isordil (Headache)  penicillin (Rash)    Intolerances        Review of Systems:    General:  No wt loss, fevers, chills, night sweats,fatigue,   Eyes:  Good vision, no reported pain  ENT:  No sore throat, pain, runny nose, dysphagia  CV:  No pain, palpitations, hypo/hypertension  Resp:  No dyspnea, cough, tachypnea, wheezing  GI:  No pain, No nausea, No vomiting, No diarrhea, No constipation, No weight loss, No fever, No pruritis, No rectal bleeding, No melena, No dysphagia  :  No pain, bleeding, incontinence, nocturia  Muscle:  No pain, weakness  Neuro:  No weakness, tingling, memory problems  Psych:  No fatigue, insomnia, mood problems, depression  Endocrine:  No polyuria, polydypsia, cold/heat intolerance  Heme:  No petechiae, ecchymosis, easy bruisability  Skin:  No rash, tattoos, scars, edema      Vital Signs Last 24 Hrs  T(C): 36.6 (20 Nov 2018 04:04), Max: 36.9 (20 Nov 2018 00:00)  T(F): 97.9 (20 Nov 2018 04:04), Max: 98.5 (20 Nov 2018 00:00)  HR: 105 (20 Nov 2018 05:57) (98 - 109)  BP: 105/71 (20 Nov 2018 05:57) (94/56 - 113/79)  BP(mean): --  RR: 18 (20 Nov 2018 04:04) (18 - 19)  SpO2: 97% (20 Nov 2018 04:04) (94% - 98%)    PHYSICAL EXAM:    Constitutional: NAD, well-developed  HEENT: EOMI, throat clear  Neck: No LAD, supple  Respiratory: CTA and P  Cardiovascular: S1 and S2, RRR, no M  Gastrointestinal: BS+, soft, NT/ND, neg HSM,  Extremities: No peripheral edema, neg clubing, cyanosis  Vascular: 2+ peripheral pulses  Neurological: A/O x 3, no focal deficits  Psychiatric: Normal mood, normal affect  Skin: No rashes      LABS:                        10.1   6.87  )-----------( 268      ( 20 Nov 2018 07:49 )             31.4     11-20    133<L>  |  83<L>  |  15  ----------------------------<  104<H>  3.0<L>   |  33<H>  |  0.86    Ca    8.1<L>      20 Nov 2018 05:26  Phos  4.3     11-20  Mg     1.5     11-20    TPro  7.0  /  Alb  3.0<L>  /  TBili  5.9<H>  /  DBili  3.8<H>  /  AST  62<H>  /  ALT  38  /  AlkPhos  315<H>  11-20          RADIOLOGY & ADDITIONAL TESTS:

## 2018-11-20 NOTE — PROGRESS NOTE ADULT - PROBLEM SELECTOR PROBLEM 4
Acute renal failure
Acute renal failure
Hyponatremia
Acute renal failure

## 2018-11-20 NOTE — PROGRESS NOTE ADULT - SUBJECTIVE AND OBJECTIVE BOX
Cardiology Progress Note    Interval events: No acute events overnight. Patient in no acute distress.  I/Os 720/5850 net neg 5130    Tele: V paced, sinus 100s    Medications:  acetaminophen   Tablet .. 975 milliGRAM(s) Oral every 4 hours PRN  aspirin  chewable 81 milliGRAM(s) Oral daily  Biotene Dry Mouth Oral Rinse 5 milliLiter(s) Swish and Spit two times a day  buMETAnide Infusion 2 mG/Hr IV Continuous <Continuous>  calcitriol   Capsule 0.5 MICROGram(s) Oral daily  calcium acetate 2001 milliGRAM(s) Oral three times a day with meals  calcium carbonate 1250 mG  + Vitamin D (OsCal 500 + D) 2 Tablet(s) Oral three times a day  gabapentin 200 milliGRAM(s) Oral three times a day  guaiFENesin   Syrup  (Sugar-Free) 100 milliGRAM(s) Oral every 6 hours PRN  influenza   Vaccine 0.5 milliLiter(s) IntraMuscular once  insulin lispro (HumaLOG) corrective regimen sliding scale   SubCutaneous three times a day before meals  insulin lispro (HumaLOG) corrective regimen sliding scale   SubCutaneous at bedtime  levothyroxine 175 MICROGram(s) Oral daily  milrinone Infusion 0.375 MICROgram(s)/kG/Min IV Continuous <Continuous>  pantoprazole    Tablet 40 milliGRAM(s) Oral before breakfast  rifaximin 550 milliGRAM(s) Oral two times a day  spironolactone 50 milliGRAM(s) Oral two times a day      Review of Systems:  Constitutional: [ ] Fever [ ] Chills [ ] Fatigue [ ] Weight change   HEENT: [ ] Blurred vision [ ] Eye Pain [ ] Headache [ ] Runny nose [ ] Sore Throat   Respiratory: [ ] Cough [ ] Wheezing [ ] Shortness of breath  Cardiovascular: [ ] Chest Pain [ ] Palpitations [ ] EDDY [ ] PND [ ] Orthopnea  Gastrointestinal: [ ] Abdominal Pain [ ] Diarrhea [ ] Constipation [ ] Hemorrhoids [ ] Nausea [ ] Vomiting  Genitourinary: [ ] Nocturia [ ] Dysuria [ ] Incontinence  Extremities: [ ] Swelling [ ] Joint Pain  Neurologic: [ ] Focal deficit [ ] Paresthesias [ ] Syncope  Lymphatic: [ ] Swelling [ ] Lymphadenopathy   Skin: [ ] Rash [ ] Ecchymoses [ ] Wounds [ ] Lesions  Psychiatry: [ ] Depression [ ] Suicidal/Homicidal Ideation [ ] Anxiety [ ] Sleep Disturbances  [x] 10 point review of systems is otherwise negative except as mentioned above            [ ]Unable to obtain    Vitals:  T(C): 36.6 (18 @ 04:04), Max: 36.9 (18 @ 00:00)  HR: 105 (18 @ 05:57) (98 - 109)  BP: 105/71 (18 @ 05:57) (94/56 - 114/84)  BP(mean): --  RR: 18 (18 @ 04:04) (18 - 19)  SpO2: 97% (18 @ 04:04) (94% - 98%)  Wt(kg): --  Daily     Daily Weight in k.4 (2018 07:55)  I&O's Summary    2018 07:01  -  2018 07:00  --------------------------------------------------------  IN: 1090.8 mL / OUT: 6850 mL / NET: -5759.2 mL        Physical Exam:  NAD  PERRL, EOMI  Normal oral [place] NC/AT  S1, S2, RRR, No m/r/g appreciated, No edema, moderately elevated JVD  + crackles  Soft, Non-tender, Non-distended, BS+  No clubbing, No joint deformity   Non-focal  No lymphadenopathy  AAOx3, Mood & affect appropriate  No rashes, No ecchymoses, No cyanosis    Labs:                        9.9    5.88  )-----------( 211      ( 2018 09:05 )             31.3     11-20    133<L>  |  83<L>  |  15  ----------------------------<  104<H>  3.0<L>   |  33<H>  |  0.86    Ca    8.1<L>      2018 05:26  Phos  4.3     -  Mg     1.5     -    TPro  7.0  /  Alb  3.0<L>  /  TBili  5.9<H>  /  DBili  3.8<H>  /  AST  62<H>  /  ALT  38  /  AlkPhos  315<H>  11-20    PT/INR - ( 2018 08:15 )   PT: 25.9 sec;   INR: 2.22 ratio      New results/imaging:

## 2018-11-20 NOTE — PROGRESS NOTE ADULT - SUBJECTIVE AND OBJECTIVE BOX
INTERVAL HPI/OVERNIGHT EVENTS: I feel much better so want to go home .   Vital Signs Last 24 Hrs  T(C): 36.6 (20 Nov 2018 12:00), Max: 36.9 (20 Nov 2018 00:00)  T(F): 97.8 (20 Nov 2018 12:00), Max: 98.5 (20 Nov 2018 00:00)  HR: 99 (20 Nov 2018 12:00) (98 - 109)  BP: 101/68 (20 Nov 2018 12:00) (94/56 - 113/79)  BP(mean): --  RR: 18 (20 Nov 2018 12:00) (18 - 19)  SpO2: 95% (20 Nov 2018 12:00) (94% - 98%)  I&O's Summary    19 Nov 2018 07:01  -  20 Nov 2018 07:00  --------------------------------------------------------  IN: 1090.8 mL / OUT: 6850 mL / NET: -5759.2 mL    20 Nov 2018 07:01  -  20 Nov 2018 17:12  --------------------------------------------------------  IN: 480 mL / OUT: 1700 mL / NET: -1220 mL      MEDICATIONS  (STANDING):  aspirin  chewable 81 milliGRAM(s) Oral daily  Biotene Dry Mouth Oral Rinse 5 milliLiter(s) Swish and Spit two times a day  buMETAnide 3 milliGRAM(s) Oral two times a day  calcitriol   Capsule 0.5 MICROGram(s) Oral daily  calcium acetate 2001 milliGRAM(s) Oral three times a day with meals  calcium carbonate 1250 mG  + Vitamin D (OsCal 500 + D) 2 Tablet(s) Oral three times a day  gabapentin 200 milliGRAM(s) Oral three times a day  influenza   Vaccine 0.5 milliLiter(s) IntraMuscular once  insulin lispro (HumaLOG) corrective regimen sliding scale   SubCutaneous three times a day before meals  insulin lispro (HumaLOG) corrective regimen sliding scale   SubCutaneous at bedtime  levothyroxine 175 MICROGram(s) Oral daily  milrinone Infusion 0.375 MICROgram(s)/kG/Min (10.89 mL/Hr) IV Continuous <Continuous>  pantoprazole    Tablet 40 milliGRAM(s) Oral before breakfast  potassium chloride    Tablet ER 40 milliEquivalent(s) Oral every 4 hours  rifaximin 550 milliGRAM(s) Oral two times a day  spironolactone 50 milliGRAM(s) Oral two times a day    MEDICATIONS  (PRN):  acetaminophen   Tablet .. 975 milliGRAM(s) Oral every 4 hours PRN Mild Pain (1 - 3)  guaiFENesin   Syrup  (Sugar-Free) 100 milliGRAM(s) Oral every 6 hours PRN Cough    LABS:                        10.1   6.87  )-----------( 268      ( 20 Nov 2018 07:49 )             31.4     11-20    133<L>  |  83<L>  |  15  ----------------------------<  104<H>  3.0<L>   |  33<H>  |  0.86    Ca    8.1<L>      20 Nov 2018 05:26  Phos  4.3     11-20  Mg     1.5     11-20    TPro  7.0  /  Alb  3.0<L>  /  TBili  5.9<H>  /  DBili  3.8<H>  /  AST  62<H>  /  ALT  38  /  AlkPhos  315<H>  11-20        CAPILLARY BLOOD GLUCOSE      POCT Blood Glucose.: 228 mg/dL (20 Nov 2018 17:02)  POCT Blood Glucose.: 131 mg/dL (20 Nov 2018 11:57)  POCT Blood Glucose.: 134 mg/dL (20 Nov 2018 07:54)  POCT Blood Glucose.: 175 mg/dL (19 Nov 2018 21:49)          REVIEW OF SYSTEMS:  CONSTITUTIONAL: No fever, weight loss, or fatigue  EYES: No eye pain, visual disturbances, or discharge  ENMT:  No difficulty hearing, tinnitus, vertigo; No sinus or throat pain  NECK: No pain or stiffness  RESPIRATORY: No cough, wheezing, chills or hemoptysis; No shortness of breath  CARDIOVASCULAR: No chest pain, palpitations, dizziness, or leg swelling  GASTROINTESTINAL: No abdominal or epigastric pain. No nausea, vomiting, or hematemesis; No diarrhea or constipation. No melena or hematochezia.  GENITOURINARY: No dysuria, frequency, hematuria, or incontinence  NEUROLOGICAL: No headaches, memory loss, loss of strength, numbness, or tremors      Consultant(s) Notes Reviewed:  [x ] YES  [ ] NO    PHYSICAL EXAM:  GENERAL: NAD, well-groomed, well-developed ,not in any distress ,  HEAD:  Atraumatic, Normocephalic  EYES: EOMI, PERRLA, conjunctiva and sclera clear  ENMT: No tonsillar erythema, exudates, or enlargement; Moist mucous membranes, Good dentition, No lesions  NECK: Supple, No JVD, Normal thyroid  NERVOUS SYSTEM:  Alert & Oriented X3, No focal deficit   CHEST/LUNG: Good air entry bilateral with no  rales, rhonchi, wheezing, or rubs  HEART: Regular rate and rhythm; No murmurs, rubs, or gallops  ABDOMEN: Soft, Nontender, Nondistended; Bowel sounds present  EXTREMITIES:  2+ Peripheral Pulses, No clubbing, cyanosis,  but less edema    Care Discussed with Consultants/Other Providers [ x] YES  [ ] NO

## 2018-11-20 NOTE — PROGRESS NOTE ADULT - PROBLEM SELECTOR PLAN 6
there is serologic evidence suggestive of Congestive Hepatopathy and this finding likely represents some component of Hepatic Encephalopathy  - continue Rifaximin and Lactulose - recommend titrating Lactulose as needed to achieve 2 - 3 BMs/day
Likely from CHF . Her GI consulted.
there is serologic evidence suggestive of Congestive Hepatopathy and this finding likely represents some component of Hepatic Encephalopathy  - continue Rifaximin and Lactulose (titrate as needed)  - check Ammonia level
there is serologic evidence suggestive of Congestive Hepatopathy and this finding likely represents some component of Hepatic Encephalopathy  - continue Rifaximin and Lactulose (titrate as needed)  - check Ammonia level
there is serologic evidence suggestive of Congestive Hepatopathy and this finding likely represents some component of Hepatic Encephalopathy  - continue Rifaximin and Lactulose - recommend titrating Lactulose as needed to achieve 2 - 3 BMs/day
there is serologic evidence suggestive of Congestive Hepatopathy and this finding likely represents some component of Hepatic Encephalopathy  - start Rifaximin and Lactulose (titrate as needed)  - check Ammonia level

## 2018-11-20 NOTE — PROGRESS NOTE ADULT - PROBLEM SELECTOR PLAN 1
- continue Milrinone to 0.375   - continue Bumetanide to 2 mg/hr  - will plan to switch to bumex 3 mg PO BID tomorrow  - needs PICC placed for home milrinone  - agree with increased Spironolactone frequency   - please check BMP/Mg Q12H while on diuretic infusion   discussed the possible need for advanced therapies and encouraged outpatient follow up in HF Clinic (appointments have been scheduled) - continue Milrinone to 0.375   - switch to bumex 3 mg PO BID    - agree with increased Spironolactone frequency   - please check BMP/Mg Q12H while on diuretic infusion   discussed the possible need for advanced therapies and encouraged outpatient follow up in HF Clinic (appointments have been scheduled)

## 2018-11-20 NOTE — PROGRESS NOTE ADULT - SUBJECTIVE AND OBJECTIVE BOX
S: no chest pain or sob       acetaminophen   Tablet .. 975 milliGRAM(s) Oral every 4 hours PRN  aspirin  chewable 81 milliGRAM(s) Oral daily  Biotene Dry Mouth Oral Rinse 5 milliLiter(s) Swish and Spit two times a day  buMETAnide Infusion 2 mG/Hr IV Continuous <Continuous>  calcitriol   Capsule 0.5 MICROGram(s) Oral daily  calcium acetate 2001 milliGRAM(s) Oral three times a day with meals  calcium carbonate 1250 mG  + Vitamin D (OsCal 500 + D) 2 Tablet(s) Oral three times a day  gabapentin 200 milliGRAM(s) Oral three times a day  guaiFENesin   Syrup  (Sugar-Free) 100 milliGRAM(s) Oral every 6 hours PRN  influenza   Vaccine 0.5 milliLiter(s) IntraMuscular once  insulin lispro (HumaLOG) corrective regimen sliding scale   SubCutaneous three times a day before meals  insulin lispro (HumaLOG) corrective regimen sliding scale   SubCutaneous at bedtime  levothyroxine 175 MICROGram(s) Oral daily  milrinone Infusion 0.375 MICROgram(s)/kG/Min IV Continuous <Continuous>  pantoprazole    Tablet 40 milliGRAM(s) Oral before breakfast  rifaximin 550 milliGRAM(s) Oral two times a day  spironolactone 50 milliGRAM(s) Oral two times a day                            10.1   6.87  )-----------( 268      ( 20 Nov 2018 07:49 )             31.4       11-20    133<L>  |  83<L>  |  15  ----------------------------<  104<H>  3.0<L>   |  33<H>  |  0.86    Ca    8.1<L>      20 Nov 2018 05:26  Phos  4.3     11-20  Mg     1.5     11-20    TPro  7.0  /  Alb  3.0<L>  /  TBili  5.9<H>  /  DBili  3.8<H>  /  AST  62<H>  /  ALT  38  /  AlkPhos  315<H>  11-20            T(C): 36.6 (11-20-18 @ 04:04), Max: 36.9 (11-20-18 @ 00:00)  HR: 105 (11-20-18 @ 05:57) (98 - 109)  BP: 105/71 (11-20-18 @ 05:57) (94/56 - 114/84)  RR: 18 (11-20-18 @ 04:04) (18 - 19)  SpO2: 97% (11-20-18 @ 04:04) (94% - 98%)  Wt(kg): --    I&O's Summary    19 Nov 2018 07:01  -  20 Nov 2018 07:00  --------------------------------------------------------  IN: 1090.8 mL / OUT: 6850 mL / NET: -5759.2 mL          Lymphatic: No lymphadenopathy + edema in LE bilaterally   Cardiovascular: Normal S1 S2,RRR, No murmur   Respiratory: Lungs clear to auscultation, normal effort 	  Gastrointestinal:  Soft, distended   Skin: No rashes, No ecchymoses, No cyanosis,     DATA:    TELEMETRY: , SR, episode of PAT    	    < from: TTE with Doppler (w/Cont) (11.07.18 @ 05:53) >  Conclusions:  1. Tethered mitral valve leaflets with normal opening.  Mitral annular calcification and thickened  mitral leaflet  tips Moderate mitral regurgitation.  2. Calcified trileaflet aortic valve with normal opening.  3. Moderately dilated left atrium.  LA volume index = 43  cc/m2.  4. Eccentric left ventricular hypertrophy (dilated left  ventricle with normal relative wall thickness).  5. Severe global left ventricular systolic dysfunction.  Endocardial visualization enhanced with intravenous  injection of Ultrasonic Enhancing Agent (Definity). No LV  thrombus.  Septal flattening consistent with right  ventricular overload.  6. Severe diastolic dysfunction with elevated filling  pressures  7. Severe right atrial enlargement.  8. Right ventricular enlargement with decreased right  ventricular systolic function.  A device wire is noted in  the right heart.  9. Dilated tricuspid annulus with malcoaptation of  tricuspid leaflets. Severe tricuspid regurgitation.  *** No previous Echo exam.  ------------------------------------------------------------------------  Confirmed on  11/7/2018 - 16:40:23 by Haley Koch M.D.    < end of copied text >      < from: Cardiac Cath Lab - Adult (05.25.18 @ 11:46) >  DIAGNOSTIC RECOMMENDATIONS: Right heart catheterization demonstrates  elevated PCWP and low cardiac output. Will transfer patient to the CCU for  inotrope assisted diuresis.  Prepared and signed by  Kunal Muller M.D.  Signed 05/29/2018 16:07:27    < end of copied text >      ASSESSMENT/PLAN: 64 y/o F with PMHx of NICM s/p AICD, HTN, moderate-severe MR, reportedly recent LHC at [place] was negative, thyroid cancer s/p thyroidectomy, DM,  admitted with decompensated acute on chronic systolic heart failure.    -continue with inotrope assisted diuresis per heart failure  - cont Bumex  -follow up heart failure  -no ischemic evaluation or interventional workup planned at this time    Kunal Muller MD

## 2018-11-21 ENCOUNTER — APPOINTMENT (OUTPATIENT)
Dept: CARDIOLOGY | Facility: CLINIC | Age: 63
End: 2018-11-21

## 2018-11-21 LAB
ALBUMIN SERPL ELPH-MCNC: 3.1 G/DL — LOW (ref 3.3–5)
ALP SERPL-CCNC: 305 U/L — HIGH (ref 40–120)
ALT FLD-CCNC: 31 U/L — SIGNIFICANT CHANGE UP (ref 10–45)
ANION GAP SERPL CALC-SCNC: 15 MMOL/L — SIGNIFICANT CHANGE UP (ref 5–17)
AST SERPL-CCNC: 43 U/L — HIGH (ref 10–40)
BILIRUB SERPL-MCNC: 5.1 MG/DL — HIGH (ref 0.2–1.2)
BUN SERPL-MCNC: 18 MG/DL — SIGNIFICANT CHANGE UP (ref 7–23)
CALCIUM SERPL-MCNC: 8.5 MG/DL — SIGNIFICANT CHANGE UP (ref 8.4–10.5)
CHLORIDE SERPL-SCNC: 86 MMOL/L — LOW (ref 96–108)
CO2 SERPL-SCNC: 29 MMOL/L — SIGNIFICANT CHANGE UP (ref 22–31)
CREAT SERPL-MCNC: 0.92 MG/DL — SIGNIFICANT CHANGE UP (ref 0.5–1.3)
GLUCOSE BLDC GLUCOMTR-MCNC: 123 MG/DL — HIGH (ref 70–99)
GLUCOSE BLDC GLUCOMTR-MCNC: 158 MG/DL — HIGH (ref 70–99)
GLUCOSE BLDC GLUCOMTR-MCNC: 218 MG/DL — HIGH (ref 70–99)
GLUCOSE BLDC GLUCOMTR-MCNC: 244 MG/DL — HIGH (ref 70–99)
GLUCOSE SERPL-MCNC: 251 MG/DL — HIGH (ref 70–99)
HCT VFR BLD CALC: 32.4 % — LOW (ref 34.5–45)
HGB BLD-MCNC: 10.3 G/DL — LOW (ref 11.5–15.5)
MCHC RBC-ENTMCNC: 22.6 PG — LOW (ref 27–34)
MCHC RBC-ENTMCNC: 31.8 GM/DL — LOW (ref 32–36)
MCV RBC AUTO: 71.2 FL — LOW (ref 80–100)
PLATELET # BLD AUTO: 312 K/UL — SIGNIFICANT CHANGE UP (ref 150–400)
POTASSIUM SERPL-MCNC: 3.9 MMOL/L — SIGNIFICANT CHANGE UP (ref 3.5–5.3)
POTASSIUM SERPL-SCNC: 3.9 MMOL/L — SIGNIFICANT CHANGE UP (ref 3.5–5.3)
PROT SERPL-MCNC: 7.2 G/DL — SIGNIFICANT CHANGE UP (ref 6–8.3)
RBC # BLD: 4.55 M/UL — SIGNIFICANT CHANGE UP (ref 3.8–5.2)
RBC # FLD: 26.4 % — HIGH (ref 10.3–14.5)
SODIUM SERPL-SCNC: 130 MMOL/L — LOW (ref 135–145)
WBC # BLD: 6.67 K/UL — SIGNIFICANT CHANGE UP (ref 3.8–10.5)
WBC # FLD AUTO: 6.67 K/UL — SIGNIFICANT CHANGE UP (ref 3.8–10.5)

## 2018-11-21 RX ORDER — POTASSIUM CHLORIDE 20 MEQ
1 PACKET (EA) ORAL
Qty: 0 | Refills: 0 | COMMUNITY

## 2018-11-21 RX ORDER — BUMETANIDE 0.25 MG/ML
3 INJECTION INTRAMUSCULAR; INTRAVENOUS
Qty: 180 | Refills: 0 | OUTPATIENT
Start: 2018-11-21 | End: 2018-12-20

## 2018-11-21 RX ORDER — CALCIUM ACETATE 667 MG
2001 TABLET ORAL
Qty: 180090 | Refills: 0 | OUTPATIENT
Start: 2018-11-21 | End: 2018-12-20

## 2018-11-21 RX ORDER — SACUBITRIL AND VALSARTAN 24; 26 MG/1; MG/1
1 TABLET, FILM COATED ORAL
Qty: 0 | Refills: 0 | COMMUNITY

## 2018-11-21 RX ORDER — GABAPENTIN 400 MG/1
2 CAPSULE ORAL
Qty: 180 | Refills: 0 | OUTPATIENT
Start: 2018-11-21 | End: 2018-12-20

## 2018-11-21 RX ORDER — SPIRONOLACTONE 25 MG/1
2 TABLET, FILM COATED ORAL
Qty: 120 | Refills: 0 | OUTPATIENT
Start: 2018-11-21 | End: 2018-12-20

## 2018-11-21 RX ORDER — PANTOPRAZOLE SODIUM 20 MG/1
1 TABLET, DELAYED RELEASE ORAL
Qty: 30 | Refills: 0 | OUTPATIENT
Start: 2018-11-21 | End: 2018-12-20

## 2018-11-21 RX ORDER — CALCITRIOL 0.5 UG/1
1 CAPSULE ORAL
Qty: 0 | Refills: 0 | COMMUNITY

## 2018-11-21 RX ORDER — FAMOTIDINE 10 MG/ML
0 INJECTION INTRAVENOUS
Qty: 0 | Refills: 0 | COMMUNITY

## 2018-11-21 RX ADMIN — Medication 5 MILLILITER(S): at 17:37

## 2018-11-21 RX ADMIN — SPIRONOLACTONE 50 MILLIGRAM(S): 25 TABLET, FILM COATED ORAL at 06:01

## 2018-11-21 RX ADMIN — CALCITRIOL 0.5 MICROGRAM(S): 0.5 CAPSULE ORAL at 13:06

## 2018-11-21 RX ADMIN — Medication 2001 MILLIGRAM(S): at 13:06

## 2018-11-21 RX ADMIN — Medication 40 MILLIEQUIVALENT(S): at 03:00

## 2018-11-21 RX ADMIN — Medication 1: at 16:45

## 2018-11-21 RX ADMIN — SPIRONOLACTONE 50 MILLIGRAM(S): 25 TABLET, FILM COATED ORAL at 17:37

## 2018-11-21 RX ADMIN — Medication 2001 MILLIGRAM(S): at 17:36

## 2018-11-21 RX ADMIN — Medication 2001 MILLIGRAM(S): at 13:03

## 2018-11-21 RX ADMIN — Medication 5 MILLILITER(S): at 05:59

## 2018-11-21 RX ADMIN — PANTOPRAZOLE SODIUM 40 MILLIGRAM(S): 20 TABLET, DELAYED RELEASE ORAL at 13:03

## 2018-11-21 RX ADMIN — Medication 2 TABLET(S): at 22:13

## 2018-11-21 RX ADMIN — BUMETANIDE 3 MILLIGRAM(S): 0.25 INJECTION INTRAMUSCULAR; INTRAVENOUS at 17:36

## 2018-11-21 RX ADMIN — Medication 175 MICROGRAM(S): at 06:00

## 2018-11-21 RX ADMIN — GABAPENTIN 200 MILLIGRAM(S): 400 CAPSULE ORAL at 13:06

## 2018-11-21 RX ADMIN — GABAPENTIN 200 MILLIGRAM(S): 400 CAPSULE ORAL at 06:00

## 2018-11-21 RX ADMIN — Medication 81 MILLIGRAM(S): at 13:06

## 2018-11-21 RX ADMIN — BUMETANIDE 3 MILLIGRAM(S): 0.25 INJECTION INTRAMUSCULAR; INTRAVENOUS at 06:00

## 2018-11-21 RX ADMIN — Medication 2 TABLET(S): at 06:00

## 2018-11-21 RX ADMIN — GABAPENTIN 200 MILLIGRAM(S): 400 CAPSULE ORAL at 22:13

## 2018-11-21 RX ADMIN — Medication 2 TABLET(S): at 13:06

## 2018-11-21 NOTE — PROGRESS NOTE ADULT - SUBJECTIVE AND OBJECTIVE BOX
Interval Events: Pt seen and examined. No acute overnight events.  Pt denies abdominal pain, n/v  Tolerating PO.     MEDICATIONS  (STANDING):  aspirin  chewable 81 milliGRAM(s) Oral daily  Biotene Dry Mouth Oral Rinse 5 milliLiter(s) Swish and Spit two times a day  buMETAnide 3 milliGRAM(s) Oral two times a day  calcitriol   Capsule 0.5 MICROGram(s) Oral daily  calcium acetate 2001 milliGRAM(s) Oral three times a day with meals  calcium carbonate 1250 mG  + Vitamin D (OsCal 500 + D) 2 Tablet(s) Oral three times a day  gabapentin 200 milliGRAM(s) Oral three times a day  influenza   Vaccine 0.5 milliLiter(s) IntraMuscular once  insulin lispro (HumaLOG) corrective regimen sliding scale   SubCutaneous three times a day before meals  insulin lispro (HumaLOG) corrective regimen sliding scale   SubCutaneous at bedtime  levothyroxine 175 MICROGram(s) Oral daily  milrinone Infusion 0.375 MICROgram(s)/kG/Min (10.89 mL/Hr) IV Continuous <Continuous>  pantoprazole    Tablet 40 milliGRAM(s) Oral before breakfast  rifaximin 550 milliGRAM(s) Oral two times a day  spironolactone 50 milliGRAM(s) Oral two times a day    MEDICATIONS  (PRN):  acetaminophen   Tablet .. 975 milliGRAM(s) Oral every 4 hours PRN Mild Pain (1 - 3)  guaiFENesin   Syrup  (Sugar-Free) 100 milliGRAM(s) Oral every 6 hours PRN Cough      Allergies    Isordil (Headache)  penicillin (Rash)    Intolerances        Review of Systems:    General:  No wt loss, fevers, chills, night sweats,fatigue,   Eyes:  Good vision, no reported pain  ENT:  No sore throat, pain, runny nose, dysphagia  CV:  No pain, palpitations, hypo/hypertension  Resp:  No dyspnea, cough, tachypnea, wheezing  GI:  No pain, No nausea, No vomiting, No diarrhea, No constipation, No weight loss, No fever, No pruritis, No rectal bleeding, No melena, No dysphagia  :  No pain, bleeding, incontinence, nocturia  Muscle:  No pain, weakness  Neuro:  No weakness, tingling, memory problems  Psych:  No fatigue, insomnia, mood problems, depression  Endocrine:  No polyuria, polydypsia, cold/heat intolerance  Heme:  No petechiae, ecchymosis, easy bruisability  Skin:  No rash, tattoos, scars, edema      Vital Signs Last 24 Hrs  T(C): 36.9 (21 Nov 2018 04:02), Max: 37.2 (20 Nov 2018 20:51)  T(F): 98.5 (21 Nov 2018 04:02), Max: 98.9 (20 Nov 2018 20:51)  HR: 106 (21 Nov 2018 04:02) (99 - 106)  BP: 102/70 (21 Nov 2018 04:02) (101/68 - 102/70)  BP(mean): --  RR: 18 (21 Nov 2018 04:02) (18 - 18)  SpO2: 95% (21 Nov 2018 04:02) (95% - 98%)    PHYSICAL EXAM:    Constitutional: NAD, well-developed  HEENT: EOMI, throat clear  Neck: No LAD, supple  Respiratory: CTA and P  Cardiovascular: S1 and S2, RRR, no M  Gastrointestinal: BS+, soft, NT/ND, neg HSM,  Extremities: No peripheral edema, neg clubing, cyanosis  Vascular: 2+ peripheral pulses  Neurological: A/O x 3, no focal deficits  Psychiatric: Normal mood, normal affect  Skin: No rashes      LABS:                        10.1   6.87  )-----------( 268      ( 20 Nov 2018 07:49 )             31.4     11-21    130<L>  |  86<L>  |  18  ----------------------------<  251<H>  3.9   |  29  |  0.92    Ca    8.5      21 Nov 2018 07:31  Phos  4.3     11-20  Mg     1.7     11-20    TPro  7.2  /  Alb  3.1<L>  /  TBili  5.1<H>  /  DBili  x   /  AST  43<H>  /  ALT  31  /  AlkPhos  305<H>  11-21          RADIOLOGY & ADDITIONAL TESTS:

## 2018-11-21 NOTE — PROGRESS NOTE ADULT - SUBJECTIVE AND OBJECTIVE BOX
Patient seen and examined in bed. No pain, no SOB.       MEDICATIONS  (STANDING):  aspirin  chewable 81 milliGRAM(s) Oral daily  Biotene Dry Mouth Oral Rinse 5 milliLiter(s) Swish and Spit two times a day  buMETAnide 3 milliGRAM(s) Oral two times a day  calcitriol   Capsule 0.5 MICROGram(s) Oral daily  calcium acetate 2001 milliGRAM(s) Oral three times a day with meals  calcium carbonate 1250 mG  + Vitamin D (OsCal 500 + D) 2 Tablet(s) Oral three times a day  gabapentin 200 milliGRAM(s) Oral three times a day  influenza   Vaccine 0.5 milliLiter(s) IntraMuscular once  insulin lispro (HumaLOG) corrective regimen sliding scale   SubCutaneous three times a day before meals  insulin lispro (HumaLOG) corrective regimen sliding scale   SubCutaneous at bedtime  levothyroxine 175 MICROGram(s) Oral daily  milrinone Infusion 0.375 MICROgram(s)/kG/Min (10.89 mL/Hr) IV Continuous <Continuous>  pantoprazole    Tablet 40 milliGRAM(s) Oral before breakfast  rifaximin 550 milliGRAM(s) Oral two times a day  spironolactone 50 milliGRAM(s) Oral two times a day      VITAL:  T(C): , Max: 37.2 (11-20-18 @ 20:51)  T(F): , Max: 98.9 (11-20-18 @ 20:51)  HR: 108 (11-21-18 @ 12:25)  BP: 94/64 (11-21-18 @ 12:25)  RR: 18 (11-21-18 @ 12:25)  SpO2: 97% (11-21-18 @ 12:25)    I and O's:    11-20 @ 07:01  -  11-21 @ 07:00  --------------------------------------------------------  IN: 1330.8 mL / OUT: 4300 mL / NET: -2969.2 mL    11-21 @ 07:01  -  11-21 @ 15:58  --------------------------------------------------------  IN: 720 mL / OUT: 1400 mL / NET: -680 mL          PHYSICAL EXAM:    Constitutional: NAD  Neck:  +JVD  Respiratory: CTAB/L  Cardiovascular: S1 and S2  Gastrointestinal: BS+, soft, NT/ND, +distention  Extremities: +2 b/l lower ext. edema  : No [place]  Skin: No rashes    LABS:                        10.3   6.67  )-----------( 312      ( 21 Nov 2018 10:39 )             32.4     11-21    130<L>  |  86<L>  |  18  ----------------------------<  251<H>  3.9   |  29  |  0.92    Ca    8.5      21 Nov 2018 07:31  Phos  4.3     11-20  Mg     1.7     11-20    TPro  7.2  /  Alb  3.1<L>  /  TBili  5.1<H>  /  DBili  x   /  AST  43<H>  /  ALT  31  /  AlkPhos  305<H>  11-21      ASSESSMENT: 63F w/ HTN, DM2, thyroid CA s/p resection, acquired hypoparathyroidism, and severe HFrEF, 11/6/18 a/w vaginal bleeding/MAGNUS/electrolyte derangements/ acute on chronic HFrEF    (1)Renal - MAGNUS - Prerenally mediated from decompensated CHF. Now resolved; renal fxn remains stable     (2)Hypokalemia - due to diuresis (in association with bumex/primacor/spironolactone) - improved s/p repletion    (3)Hypomagnesemia - in association with diuresis; improved s/p repletion    (4)Bone - hypocalcemia/hyperphosphatemia due to hypoparathyroidism (s/p iatrogenic parathyroidectomy). Very chronic. Acceptable for now, on oscal, calcitriol, and phoslo.    (5)CV - diuresing well, now on PO bumex 3mg BID, primacor, and spironolactone      RECOMMEND:  - primacor/spironolactone as ordered  - PO Bumex as ordered  - Monitor I & Os  - Plan to be discharged home with PICC with home milrinone   - Dose new meds for GFR 50-60ml/min (present dosing is acceptable)      Rola Jovel, NP-C  [facility]  [phone]

## 2018-11-21 NOTE — PROGRESS NOTE ADULT - SUBJECTIVE AND OBJECTIVE BOX
INTERVAL HPI/OVERNIGHT EVENTS: Feeling better .   Vital Signs Last 24 Hrs  T(C): 36.9 (21 Nov 2018 04:02), Max: 37.2 (20 Nov 2018 20:51)  T(F): 98.5 (21 Nov 2018 04:02), Max: 98.9 (20 Nov 2018 20:51)  HR: 106 (21 Nov 2018 04:02) (99 - 106)  BP: 102/70 (21 Nov 2018 04:02) (101/68 - 102/70)  BP(mean): --  RR: 18 (21 Nov 2018 04:02) (18 - 18)  SpO2: 95% (21 Nov 2018 04:02) (95% - 98%)  I&O's Summary    20 Nov 2018 07:01  -  21 Nov 2018 07:00  --------------------------------------------------------  IN: 1330.8 mL / OUT: 4300 mL / NET: -2969.2 mL      MEDICATIONS  (STANDING):  aspirin  chewable 81 milliGRAM(s) Oral daily  Biotene Dry Mouth Oral Rinse 5 milliLiter(s) Swish and Spit two times a day  buMETAnide 3 milliGRAM(s) Oral two times a day  calcitriol   Capsule 0.5 MICROGram(s) Oral daily  calcium acetate 2001 milliGRAM(s) Oral three times a day with meals  calcium carbonate 1250 mG  + Vitamin D (OsCal 500 + D) 2 Tablet(s) Oral three times a day  gabapentin 200 milliGRAM(s) Oral three times a day  influenza   Vaccine 0.5 milliLiter(s) IntraMuscular once  insulin lispro (HumaLOG) corrective regimen sliding scale   SubCutaneous three times a day before meals  insulin lispro (HumaLOG) corrective regimen sliding scale   SubCutaneous at bedtime  levothyroxine 175 MICROGram(s) Oral daily  milrinone Infusion 0.375 MICROgram(s)/kG/Min (10.89 mL/Hr) IV Continuous <Continuous>  pantoprazole    Tablet 40 milliGRAM(s) Oral before breakfast  rifaximin 550 milliGRAM(s) Oral two times a day  spironolactone 50 milliGRAM(s) Oral two times a day    MEDICATIONS  (PRN):  acetaminophen   Tablet .. 975 milliGRAM(s) Oral every 4 hours PRN Mild Pain (1 - 3)  guaiFENesin   Syrup  (Sugar-Free) 100 milliGRAM(s) Oral every 6 hours PRN Cough    LABS:                        10.1   6.87  )-----------( 268      ( 20 Nov 2018 07:49 )             31.4     11-21    130<L>  |  86<L>  |  18  ----------------------------<  251<H>  3.9   |  29  |  0.92    Ca    8.5      21 Nov 2018 07:31  Phos  4.3     11-20  Mg     1.7     11-20    TPro  7.2  /  Alb  3.1<L>  /  TBili  5.1<H>  /  DBili  x   /  AST  43<H>  /  ALT  31  /  AlkPhos  305<H>  11-21        CAPILLARY BLOOD GLUCOSE      POCT Blood Glucose.: 244 mg/dL (21 Nov 2018 08:00)  POCT Blood Glucose.: 142 mg/dL (20 Nov 2018 21:26)  POCT Blood Glucose.: 228 mg/dL (20 Nov 2018 17:02)  POCT Blood Glucose.: 131 mg/dL (20 Nov 2018 11:57)          REVIEW OF SYSTEMS:  CONSTITUTIONAL: No fever, weight loss, or fatigue  EYES: No eye pain, visual disturbances, or discharge  ENMT:  No difficulty hearing, tinnitus, vertigo; No sinus or throat pain  NECK: No pain or stiffness  RESPIRATORY: No cough, wheezing, chills or hemoptysis; No shortness of breath  CARDIOVASCULAR: No chest pain, palpitations, dizziness, or leg swelling  GASTROINTESTINAL: No abdominal or epigastric pain. No nausea, vomiting, or hematemesis; No diarrhea or constipation. No melena or hematochezia.  GENITOURINARY: No dysuria, frequency, hematuria, or incontinence  NEUROLOGICAL: No headaches, memory loss, loss of strength, numbness, or tremors      Consultant(s) Notes Reviewed:  [x ] YES  [ ] NO    PHYSICAL EXAM:  GENERAL: NAD, well-groomed, well-developed, not in any distress ,  HEAD:  Atraumatic, Normocephalic  EYES: EOMI, PERRLA, conjunctiva and sclera clear  ENMT: No tonsillar erythema, exudates, or enlargement; Moist mucous membranes, Good dentition, No lesions  NECK: Supple, No JVD, Normal thyroid  NERVOUS SYSTEM:  Alert & Oriented X3, No focal deficit   CHEST/LUNG: Good air entry bilateral with no  rales, rhonchi, wheezing, or rubs  HEART: Regular rate and rhythm; No murmurs, rubs, or gallops  ABDOMEN: Soft, Nontender, Nondistended; Bowel sounds present  EXTREMITIES:  2+ Peripheral Pulses, No clubbing, cyanosis, less edema    Care Discussed with Consultants/Other Providers [ x] YES  [ ] NO

## 2018-11-21 NOTE — PROGRESS NOTE ADULT - SUBJECTIVE AND OBJECTIVE BOX
pt seen and examined, no complaints, ROS - .     acetaminophen   Tablet .. 975 milliGRAM(s) Oral every 4 hours PRN  aspirin  chewable 81 milliGRAM(s) Oral daily  Biotene Dry Mouth Oral Rinse 5 milliLiter(s) Swish and Spit two times a day  buMETAnide 3 milliGRAM(s) Oral two times a day  calcitriol   Capsule 0.5 MICROGram(s) Oral daily  calcium acetate 2001 milliGRAM(s) Oral three times a day with meals  calcium carbonate 1250 mG  + Vitamin D (OsCal 500 + D) 2 Tablet(s) Oral three times a day  gabapentin 200 milliGRAM(s) Oral three times a day  guaiFENesin   Syrup  (Sugar-Free) 100 milliGRAM(s) Oral every 6 hours PRN  influenza   Vaccine 0.5 milliLiter(s) IntraMuscular once  insulin lispro (HumaLOG) corrective regimen sliding scale   SubCutaneous three times a day before meals  insulin lispro (HumaLOG) corrective regimen sliding scale   SubCutaneous at bedtime  levothyroxine 175 MICROGram(s) Oral daily  milrinone Infusion 0.375 MICROgram(s)/kG/Min IV Continuous <Continuous>  pantoprazole    Tablet 40 milliGRAM(s) Oral before breakfast  rifaximin 550 milliGRAM(s) Oral two times a day  spironolactone 50 milliGRAM(s) Oral two times a day                            10.1   6.87  )-----------( 268      ( 20 Nov 2018 07:49 )             31.4       Hemoglobin: 10.1 g/dL (11-20 @ 07:49)  Hemoglobin: 9.9 g/dL (11-19 @ 09:05)  Hemoglobin: 10.2 g/dL (11-18 @ 08:11)  Hemoglobin: 10.4 g/dL (11-17 @ 07:16)      11-21    130<L>  |  86<L>  |  18  ----------------------------<  251<H>  3.9   |  29  |  0.92    Ca    8.5      21 Nov 2018 07:31  Phos  4.3     11-20  Mg     1.7     11-20    TPro  7.2  /  Alb  3.1<L>  /  TBili  5.1<H>  /  DBili  x   /  AST  43<H>  /  ALT  31  /  AlkPhos  305<H>  11-21    Creatinine Trend: 0.92<--, 0.93<--, 0.86<--, 0.85<--, 0.93<--, 0.97<--    COAGS:           T(C): 36.9 (11-21-18 @ 04:02), Max: 37.2 (11-20-18 @ 20:51)  HR: 106 (11-21-18 @ 04:02) (99 - 106)  BP: 102/70 (11-21-18 @ 04:02) (101/68 - 102/70)  RR: 18 (11-21-18 @ 04:02) (18 - 18)  SpO2: 95% (11-21-18 @ 04:02) (95% - 98%)  Wt(kg): --    I&O's Summary    20 Nov 2018 07:01  -  21 Nov 2018 07:00  --------------------------------------------------------  IN: 1330.8 mL / OUT: 4300 mL / NET: -2969.2 mL        Lymphatic: No lymphadenopathy + edema in LE bilaterally   Cardiovascular: Normal S1 S2,RRR, No murmur   Respiratory: Lungs clear to auscultation, normal effort 	  Gastrointestinal:  Soft, distended   Skin: No rashes, No ecchymoses, No cyanosis,     DATA:    TELEMETRY: 	      	    < from: TTE with Doppler (w/Cont) (11.07.18 @ 05:53) >  Conclusions:  1. Tethered mitral valve leaflets with normal opening.  Mitral annular calcification and thickened  mitral leaflet  tips Moderate mitral regurgitation.  2. Calcified trileaflet aortic valve with normal opening.  3. Moderately dilated left atrium.  LA volume index = 43  cc/m2.  4. Eccentric left ventricular hypertrophy (dilated left  ventricle with normal relative wall thickness).  5. Severe global left ventricular systolic dysfunction.  Endocardial visualization enhanced with intravenous  injection of Ultrasonic Enhancing Agent (Definity). No LV  thrombus.  Septal flattening consistent with right  ventricular overload.  6. Severe diastolic dysfunction with elevated filling  pressures  7. Severe right atrial enlargement.  8. Right ventricular enlargement with decreased right  ventricular systolic function.  A device wire is noted in  the right heart.  9. Dilated tricuspid annulus with malcoaptation of  tricuspid leaflets. Severe tricuspid regurgitation.  *** No previous Echo exam.  ------------------------------------------------------------------------  Confirmed on  11/7/2018 - 16:40:23 by Haley Koch M.D.    < end of copied text >      < from: Cardiac Cath Lab - Adult (05.25.18 @ 11:46) >  DIAGNOSTIC RECOMMENDATIONS: Right heart catheterization demonstrates  elevated PCWP and low cardiac output. Will transfer patient to the CCU for  inotrope assisted diuresis.  Prepared and signed by  Kunal Muller M.D.  Signed 05/29/2018 16:07:27    < end of copied text >      ASSESSMENT/PLAN: 62 y/o F with PMHx of NICM s/p AICD, HTN, moderate-severe MR, reportedly recent LHC at [place] was negative, thyroid cancer s/p thyroidectomy, DM,  admitted with decompensated acute on chronic systolic heart failure.    - s/p PICC for home milrinone  - Switched to PO Bumex  - Na improved,   - D/C planning     Augusto Grimes MD, [facility]

## 2018-11-21 NOTE — PROGRESS NOTE ADULT - ASSESSMENT
62yo post-menopausal F w/ pmh of CHF, thyroid ca, HTN, DM, daily ASA user,  c/o vaginal bleeding that she noticed this morning. Pt states she woke up feeling fatigued with abdominal pressure and that when she sat down, she noticed that she had bled through her underwear. Pt has not experienced any bleeding episodes like this in the past. she admits to normal urinary and bowel functions and denies any HA, dizziness, SOB, CP, N/V/D, constipation. Pt has no known uterine pathologies and has not seen a GYN in 15-20 years. Currently Pt reports fatigue and daughter states she has been in this state for the past couple weeks since she was discharged from hospital for CHF exacerbation.      Problem/Plan - 1:  ·  Problem: Acute on chronic systolic congestive heart failure.  Plan: Heart Failure team helping and D/W attending . Changed to PO  Bumex 3mg BID and continuing Milrinone drip as well Spironolactone .  S/P PICC Line.     Problem/Plan - 2:  ·  Problem: Acute renal failure.  Plan: Resolved.   Renal following .     Problem/Plan - 3:  ·  Problem: Hyperkalemia/ Hypokalemia/ hypomagnesemia  .  Plan: Correcting.      Problem/Plan - 4:  ·  Problem: Hyponatremia.  Plan: Correcting . Management per renal.      Problem/Plan - 5:  ·  Problem:  Hypocalcemia.  Plan: Corrected.  Replacing IV and PO ..      Problem/Plan - 6:  Problem:  Jaundice, hepatocellular with abnormal LFT . Plan: Likely from CHF . LFT trending down . Management per GI.     Problem/Plan - 7:  ·  Problem: DM (diabetes mellitus).  Plan: SSI and Lantus .Sugars in good control.      Problem/Plan - 8:  ·  Problem: UTI .  Plan: S/P Abxs .      Problem/Plan - 9:  ·  Problem: Hypothyroidism.  Plan: Synthroid home dose and free T4 pending.       Problem/Plan - 10:  Problem: Vaginal bleeding. Plan; GYn follow up pending and awaiting Endometrial BX.      Problem/Plan - 11:  ·  Problem: Coagulopathy .  Plan: Sec to Liver injury. INR up. Holding DVT prophylaxis as vaginal bleeding.       Problem/Plan - 12:  ·  Problem: Feet pain sec to Diabetic Neuropathy  .  Plan: Resolved . Increased  Neurontin. Normal  B12 and Folate as well TFT.      Problem/Plan - 13:  ·  Problem: Mod to Severe MR and Severe TR .  Plan: Structural Heart team following.      Problem/Plan - 14:  ·  Problem: Nausea with Vomiting .  Plan: PPI and Zofran . GI following. Resolved.     Disposition : DC planning home .

## 2018-11-21 NOTE — PROGRESS NOTE ADULT - SUBJECTIVE AND OBJECTIVE BOX
Patient with no anginal chest pain or shortness of breath  ROS otherwise negative        MEDICATIONS  (STANDING):  aspirin  chewable 81 milliGRAM(s) Oral daily  Biotene Dry Mouth Oral Rinse 5 milliLiter(s) Swish and Spit two times a day  buMETAnide 3 milliGRAM(s) Oral two times a day  calcitriol   Capsule 0.5 MICROGram(s) Oral daily  calcium acetate 2001 milliGRAM(s) Oral three times a day with meals  calcium carbonate 1250 mG  + Vitamin D (OsCal 500 + D) 2 Tablet(s) Oral three times a day  gabapentin 200 milliGRAM(s) Oral three times a day  influenza   Vaccine 0.5 milliLiter(s) IntraMuscular once  insulin lispro (HumaLOG) corrective regimen sliding scale   SubCutaneous three times a day before meals  insulin lispro (HumaLOG) corrective regimen sliding scale   SubCutaneous at bedtime  levothyroxine 175 MICROGram(s) Oral daily  milrinone Infusion 0.375 MICROgram(s)/kG/Min (10.89 mL/Hr) IV Continuous <Continuous>  pantoprazole    Tablet 40 milliGRAM(s) Oral before breakfast  rifaximin 550 milliGRAM(s) Oral two times a day  spironolactone 50 milliGRAM(s) Oral two times a day    MEDICATIONS  (PRN):  acetaminophen   Tablet .. 975 milliGRAM(s) Oral every 4 hours PRN Mild Pain (1 - 3)  guaiFENesin   Syrup  (Sugar-Free) 100 milliGRAM(s) Oral every 6 hours PRN Cough      LABS:                        10.1   6.87  )-----------( 268      ( 20 Nov 2018 07:49 )             31.4     Hemoglobin: 10.1 g/dL (11-20 @ 07:49)  Hemoglobin: 9.9 g/dL (11-19 @ 09:05)  Hemoglobin: 10.2 g/dL (11-18 @ 08:11)  Hemoglobin: 10.4 g/dL (11-17 @ 07:16)    11-21    130<L>  |  86<L>  |  18  ----------------------------<  251<H>  3.9   |  29  |  0.92    Ca    8.5      21 Nov 2018 07:31  Phos  4.3     11-20  Mg     1.7     11-20    TPro  7.2  /  Alb  3.1<L>  /  TBili  5.1<H>  /  DBili  x   /  AST  43<H>  /  ALT  31  /  AlkPhos  305<H>  11-21    Creatinine Trend: 0.92<--, 0.93<--, 0.86<--, 0.85<--, 0.93<--, 0.97<--           PHYSICAL EXAM  Vital Signs Last 24 Hrs  T(C): 36.9 (21 Nov 2018 04:02), Max: 37.2 (20 Nov 2018 20:51)  T(F): 98.5 (21 Nov 2018 04:02), Max: 98.9 (20 Nov 2018 20:51)  HR: 106 (21 Nov 2018 04:02) (99 - 106)  BP: 102/70 (21 Nov 2018 04:02) (101/68 - 102/70)  BP(mean): --  RR: 18 (21 Nov 2018 04:02) (18 - 18)  SpO2: 95% (21 Nov 2018 04:02) (95% - 98%)        Lymphatic: No lymphadenopathy + edema in LE bilaterally   Cardiovascular: Normal S1 S2,RRR, No murmur   Respiratory: Lungs clear to auscultation, normal effort 	  Gastrointestinal:  Soft, distended   Skin: No rashes, No ecchymoses, No cyanosis,     DATA:    TELEMETRY: , SR, episode of PAT    	    < from: TTE with Doppler (w/Cont) (11.07.18 @ 05:53) >  Conclusions:  1. Tethered mitral valve leaflets with normal opening.  Mitral annular calcification and thickened  mitral leaflet  tips Moderate mitral regurgitation.  2. Calcified trileaflet aortic valve with normal opening.  3. Moderately dilated left atrium.  LA volume index = 43  cc/m2.  4. Eccentric left ventricular hypertrophy (dilated left  ventricle with normal relative wall thickness).  5. Severe global left ventricular systolic dysfunction.  Endocardial visualization enhanced with intravenous  injection of Ultrasonic Enhancing Agent (Definity). No LV  thrombus.  Septal flattening consistent with right  ventricular overload.  6. Severe diastolic dysfunction with elevated filling  pressures  7. Severe right atrial enlargement.  8. Right ventricular enlargement with decreased right  ventricular systolic function.  A device wire is noted in  the right heart.  9. Dilated tricuspid annulus with malcoaptation of  tricuspid leaflets. Severe tricuspid regurgitation.  *** No previous Echo exam.  ------------------------------------------------------------------------  Confirmed on  11/7/2018 - 16:40:23 by Haley Koch M.D.    < end of copied text >      < from: Cardiac Cath Lab - Adult (05.25.18 @ 11:46) >  DIAGNOSTIC RECOMMENDATIONS: Right heart catheterization demonstrates  elevated PCWP and low cardiac output. Will transfer patient to the CCU for  inotrope assisted diuresis.  Prepared and signed by  Kunal Muller M.D.  Signed 05/29/2018 16:07:27    < end of copied text >      ASSESSMENT/PLAN: 64 y/o F with PMHx of NICM s/p AICD, HTN, moderate-severe MR, reportedly recent LHC at [place] was negative, thyroid cancer s/p thyroidectomy, DM,  admitted with decompensated acute on chronic systolic heart failure.    -continue with inotrope assisted diuresis per heart failure  -CHF team noted - recommending PO  Bumex  -no ischemic evaluation or interventional workup planned at this time  - f/u with Dr Robbins for cardio upon DC

## 2018-11-21 NOTE — PROGRESS NOTE ADULT - SUBJECTIVE AND OBJECTIVE BOX
EP ATTENDING    tele: NSR, biv pacing    no palpitations, no syncope, no angina    acetaminophen   Tablet .. 975 milliGRAM(s) Oral every 4 hours PRN  aspirin  chewable 81 milliGRAM(s) Oral daily  Biotene Dry Mouth Oral Rinse 5 milliLiter(s) Swish and Spit two times a day  buMETAnide 3 milliGRAM(s) Oral two times a day  calcitriol   Capsule 0.5 MICROGram(s) Oral daily  calcium acetate 2001 milliGRAM(s) Oral three times a day with meals  calcium carbonate 1250 mG  + Vitamin D (OsCal 500 + D) 2 Tablet(s) Oral three times a day  gabapentin 200 milliGRAM(s) Oral three times a day  guaiFENesin   Syrup  (Sugar-Free) 100 milliGRAM(s) Oral every 6 hours PRN  influenza   Vaccine 0.5 milliLiter(s) IntraMuscular once  insulin lispro (HumaLOG) corrective regimen sliding scale   SubCutaneous three times a day before meals  insulin lispro (HumaLOG) corrective regimen sliding scale   SubCutaneous at bedtime  levothyroxine 175 MICROGram(s) Oral daily  milrinone Infusion 0.375 MICROgram(s)/kG/Min IV Continuous <Continuous>  pantoprazole    Tablet 40 milliGRAM(s) Oral before breakfast  rifaximin 550 milliGRAM(s) Oral two times a day  spironolactone 50 milliGRAM(s) Oral two times a day                            10.1   6.87  )-----------( 268      ( 20 Nov 2018 07:49 )             31.4       11-21    130<L>  |  86<L>  |  18  ----------------------------<  251<H>  3.9   |  29  |  0.92    Ca    8.5      21 Nov 2018 07:31  Phos  4.3     11-20  Mg     1.7     11-20    TPro  7.2  /  Alb  3.1<L>  /  TBili  5.1<H>  /  DBili  x   /  AST  43<H>  /  ALT  31  /  AlkPhos  305<H>  11-21      T(C): 36.9 (11-21-18 @ 04:02), Max: 37.2 (11-20-18 @ 20:51)  HR: 106 (11-21-18 @ 04:02) (99 - 106)  BP: 102/70 (11-21-18 @ 04:02) (101/68 - 102/70)  RR: 18 (11-21-18 @ 04:02) (18 - 18)  SpO2: 95% (11-21-18 @ 04:02) (95% - 98%)  Wt(kg): --    JVP 12  tachy, no murmurs  lungs with rales  soft nt/nd  + 2 edema    device interrogation normal  EKG: NSR, Biv pacing      A/P) She is a pleasant 64 y/o female PMH severe NICM (LVEF 10-15%) who had a [employer] Biv-ICD implanted at [place]. A recent LHC at [place] was also unremarkable. She now returns for with a severe decompensation of her NICM. Her device interrogation demonstrates excellent RA, RV and LV lead function. She denies palpitations nor high voltage therapy.     -continue further diuresis as she's still volume overloaded  -f/u CHF consult for advanced therapies (VAD vs transplant)  -no need for AAD  -Her biv-icd was recently optimized by me. Future options including turning off BiV pacing to see if she responds better. This can be addressed when she's euvolemic  -f/u with [facility] after discharge

## 2018-11-22 LAB
ANION GAP SERPL CALC-SCNC: 16 MMOL/L — SIGNIFICANT CHANGE UP (ref 5–17)
BUN SERPL-MCNC: 18 MG/DL — SIGNIFICANT CHANGE UP (ref 7–23)
CALCIUM SERPL-MCNC: 8.5 MG/DL — SIGNIFICANT CHANGE UP (ref 8.4–10.5)
CHLORIDE SERPL-SCNC: 87 MMOL/L — LOW (ref 96–108)
CO2 SERPL-SCNC: 30 MMOL/L — SIGNIFICANT CHANGE UP (ref 22–31)
CREAT SERPL-MCNC: 0.84 MG/DL — SIGNIFICANT CHANGE UP (ref 0.5–1.3)
GLUCOSE BLDC GLUCOMTR-MCNC: 121 MG/DL — HIGH (ref 70–99)
GLUCOSE BLDC GLUCOMTR-MCNC: 162 MG/DL — HIGH (ref 70–99)
GLUCOSE BLDC GLUCOMTR-MCNC: 201 MG/DL — HIGH (ref 70–99)
GLUCOSE BLDC GLUCOMTR-MCNC: 209 MG/DL — HIGH (ref 70–99)
GLUCOSE SERPL-MCNC: 100 MG/DL — HIGH (ref 70–99)
HCT VFR BLD CALC: 34.7 % — SIGNIFICANT CHANGE UP (ref 34.5–45)
HGB BLD-MCNC: 11.1 G/DL — LOW (ref 11.5–15.5)
MAGNESIUM SERPL-MCNC: 1.4 MG/DL — LOW (ref 1.6–2.6)
MCHC RBC-ENTMCNC: 24.1 PG — LOW (ref 27–34)
MCHC RBC-ENTMCNC: 32 GM/DL — SIGNIFICANT CHANGE UP (ref 32–36)
MCV RBC AUTO: 75.1 FL — LOW (ref 80–100)
PLATELET # BLD AUTO: 334 K/UL — SIGNIFICANT CHANGE UP (ref 150–400)
POTASSIUM SERPL-MCNC: 3.1 MMOL/L — LOW (ref 3.5–5.3)
POTASSIUM SERPL-SCNC: 3.1 MMOL/L — LOW (ref 3.5–5.3)
RBC # BLD: 4.62 M/UL — SIGNIFICANT CHANGE UP (ref 3.8–5.2)
RBC # FLD: 24.9 % — HIGH (ref 10.3–14.5)
SODIUM SERPL-SCNC: 133 MMOL/L — LOW (ref 135–145)
WBC # BLD: 6 K/UL — SIGNIFICANT CHANGE UP (ref 3.8–10.5)
WBC # FLD AUTO: 6 K/UL — SIGNIFICANT CHANGE UP (ref 3.8–10.5)

## 2018-11-22 RX ORDER — MAGNESIUM SULFATE 500 MG/ML
2 VIAL (ML) INJECTION ONCE
Qty: 0 | Refills: 0 | Status: COMPLETED | OUTPATIENT
Start: 2018-11-22 | End: 2018-11-22

## 2018-11-22 RX ORDER — POTASSIUM CHLORIDE 20 MEQ
20 PACKET (EA) ORAL ONCE
Qty: 0 | Refills: 0 | Status: COMPLETED | OUTPATIENT
Start: 2018-11-22 | End: 2018-11-22

## 2018-11-22 RX ADMIN — Medication 81 MILLIGRAM(S): at 12:44

## 2018-11-22 RX ADMIN — GABAPENTIN 200 MILLIGRAM(S): 400 CAPSULE ORAL at 06:30

## 2018-11-22 RX ADMIN — Medication 50 GRAM(S): at 12:25

## 2018-11-22 RX ADMIN — Medication 100 MILLIGRAM(S): at 11:15

## 2018-11-22 RX ADMIN — SPIRONOLACTONE 50 MILLIGRAM(S): 25 TABLET, FILM COATED ORAL at 17:26

## 2018-11-22 RX ADMIN — MILRINONE LACTATE 10.89 MICROGRAM(S)/KG/MIN: 1 INJECTION, SOLUTION INTRAVENOUS at 22:16

## 2018-11-22 RX ADMIN — Medication 175 MICROGRAM(S): at 06:30

## 2018-11-22 RX ADMIN — BUMETANIDE 3 MILLIGRAM(S): 0.25 INJECTION INTRAMUSCULAR; INTRAVENOUS at 06:30

## 2018-11-22 RX ADMIN — Medication 2 TABLET(S): at 12:44

## 2018-11-22 RX ADMIN — Medication 5 MILLILITER(S): at 17:27

## 2018-11-22 RX ADMIN — Medication 2001 MILLIGRAM(S): at 17:26

## 2018-11-22 RX ADMIN — SPIRONOLACTONE 50 MILLIGRAM(S): 25 TABLET, FILM COATED ORAL at 06:30

## 2018-11-22 RX ADMIN — CALCITRIOL 0.5 MICROGRAM(S): 0.5 CAPSULE ORAL at 12:44

## 2018-11-22 RX ADMIN — GABAPENTIN 200 MILLIGRAM(S): 400 CAPSULE ORAL at 22:15

## 2018-11-22 RX ADMIN — PANTOPRAZOLE SODIUM 40 MILLIGRAM(S): 20 TABLET, DELAYED RELEASE ORAL at 06:30

## 2018-11-22 RX ADMIN — Medication 2: at 12:25

## 2018-11-22 RX ADMIN — GABAPENTIN 200 MILLIGRAM(S): 400 CAPSULE ORAL at 12:45

## 2018-11-22 RX ADMIN — Medication 2001 MILLIGRAM(S): at 09:18

## 2018-11-22 RX ADMIN — MILRINONE LACTATE 10.89 MICROGRAM(S)/KG/MIN: 1 INJECTION, SOLUTION INTRAVENOUS at 17:27

## 2018-11-22 RX ADMIN — Medication 50 MILLIEQUIVALENT(S): at 14:41

## 2018-11-22 RX ADMIN — Medication 2001 MILLIGRAM(S): at 12:44

## 2018-11-22 RX ADMIN — BUMETANIDE 3 MILLIGRAM(S): 0.25 INJECTION INTRAMUSCULAR; INTRAVENOUS at 17:26

## 2018-11-22 RX ADMIN — Medication 2 TABLET(S): at 22:15

## 2018-11-22 RX ADMIN — Medication 2 TABLET(S): at 06:29

## 2018-11-22 RX ADMIN — Medication 1: at 16:44

## 2018-11-22 NOTE — PROGRESS NOTE ADULT - SUBJECTIVE AND OBJECTIVE BOX
NEPHROLOGY - NSN    Patient seen and examined.  Upset because she wasnt d/c yesterday  No significant events noted     MEDICATIONS  (STANDING):  aspirin  chewable 81 milliGRAM(s) Oral daily  Biotene Dry Mouth Oral Rinse 5 milliLiter(s) Swish and Spit two times a day  buMETAnide 3 milliGRAM(s) Oral two times a day  calcitriol   Capsule 0.5 MICROGram(s) Oral daily  calcium acetate 2001 milliGRAM(s) Oral three times a day with meals  calcium carbonate 1250 mG  + Vitamin D (OsCal 500 + D) 2 Tablet(s) Oral three times a day  gabapentin 200 milliGRAM(s) Oral three times a day  influenza   Vaccine 0.5 milliLiter(s) IntraMuscular once  insulin lispro (HumaLOG) corrective regimen sliding scale   SubCutaneous three times a day before meals  insulin lispro (HumaLOG) corrective regimen sliding scale   SubCutaneous at bedtime  levothyroxine 175 MICROGram(s) Oral daily  milrinone Infusion 0.375 MICROgram(s)/kG/Min (10.89 mL/Hr) IV Continuous <Continuous>  pantoprazole    Tablet 40 milliGRAM(s) Oral before breakfast  potassium chloride  20 mEq/100 mL IVPB 20 milliEquivalent(s) IV Intermittent once  rifaximin 550 milliGRAM(s) Oral two times a day  spironolactone 50 milliGRAM(s) Oral two times a day    VITALS:  T(C): , Max: 36.7 (11-21-18 @ 21:59)  T(F): , Max: 98 (11-21-18 @ 21:59)  HR: 101 (11-22-18 @ 12:19)  BP: 102/67 (11-22-18 @ 12:19)  BP(mean): --  RR: 18 (11-22-18 @ 12:19)  SpO2: 95% (11-22-18 @ 12:19)  Wt(kg): --  I and O's:    11-21 @ 07:01  -  11-22 @ 07:00  --------------------------------------------------------  IN: 900 mL / OUT: 3850 mL / NET: -2950 mL    REVIEW OF SYSTEMS:  Full ROS done and were negative unless otherwise indicated in HPI/assessment.     PHYSICAL EXAM:  Constitutional: NAD  HEENT: + JVD  Respiratory: CTA B/L  Cardiovascular: S1 and S2, RRR  Gastrointestinal: + BS, soft, NT, + distended   Extremities: +edema  Neurological: AAO x 3  : No [place]  Access: Not applicable    LABS:                        11.1   6.0   )-----------( 334      ( 22 Nov 2018 07:02 )             34.7     11-22    133<L>  |  87<L>  |  18  ----------------------------<  100<H>  3.1<L>   |  30  |  0.84    Ca    8.5      22 Nov 2018 07:06  Mg     1.4     11-22    TPro  7.2  /  Alb  3.1<L>  /  TBili  5.1<H>  /  DBili  x   /  AST  43<H>  /  ALT  31  /  AlkPhos  305<H>  11-21    ASSESSMENT   63F w/ HTN, DM2, thyroid CA s/p resection, acquired hypoparathyroidism, and severe HFrEF, 11/6/18 a/w vaginal bleeding/MAGNUS/electrolyte derangements/ acute on chronic HFrEF    (1)Renal - MAGNUS - Prerenally mediated from decompensated CHF - Resolved. Renal function intact     (2)Hypokalemia - due to diuresis; trending down    (3)Hypomagnesemia - in association with diuresis; trending down    (4)Bone - hypocalcemia/hyperphosphatemia due to hypoparathyroidism (s/p iatrogenic parathyroidectomy). Very chronic. Acceptable for now, on oscal, calcitriol, and phoslo.    (5)CV - diuresing well, volume status much improved relative to admission    RECOMMEND:  - inotrope assisted diuresis per CACT/HF team  	- aldactone 50 po bid and bumex 3 po bid  	- milrinone gtt (planned to be d/c home with milrinone gtt per CACT/HF team)  - a/w KCL and Mg supplements as ordered  - Monitor I & Os, daily weights  - BMP daily while admitted  - no objection to d/c planning  - Dose new meds for GFR 50-60ml/min (present dosing is acceptable)    Nannette Rice, NP  [facility]  [phone]

## 2018-11-22 NOTE — PROGRESS NOTE ADULT - SUBJECTIVE AND OBJECTIVE BOX
INTERVAL HPI/OVERNIGHT EVENTS: Patient upset that she was not able to go home yesterday.   Vital Signs Last 24 Hrs  T(C): 36.2 (22 Nov 2018 12:19), Max: 36.7 (21 Nov 2018 21:59)  T(F): 97.2 (22 Nov 2018 12:19), Max: 98 (21 Nov 2018 21:59)  HR: 101 (22 Nov 2018 12:19) (98 - 109)  BP: 102/67 (22 Nov 2018 12:19) (102/67 - 113/83)  BP(mean): --  RR: 18 (22 Nov 2018 12:19) (18 - 18)  SpO2: 95% (22 Nov 2018 12:19) (95% - 98%)  I&O's Summary    21 Nov 2018 07:01  -  22 Nov 2018 07:00  --------------------------------------------------------  IN: 900 mL / OUT: 3850 mL / NET: -2950 mL      MEDICATIONS  (STANDING):  aspirin  chewable 81 milliGRAM(s) Oral daily  Biotene Dry Mouth Oral Rinse 5 milliLiter(s) Swish and Spit two times a day  buMETAnide 3 milliGRAM(s) Oral two times a day  calcitriol   Capsule 0.5 MICROGram(s) Oral daily  calcium acetate 2001 milliGRAM(s) Oral three times a day with meals  calcium carbonate 1250 mG  + Vitamin D (OsCal 500 + D) 2 Tablet(s) Oral three times a day  gabapentin 200 milliGRAM(s) Oral three times a day  influenza   Vaccine 0.5 milliLiter(s) IntraMuscular once  insulin lispro (HumaLOG) corrective regimen sliding scale   SubCutaneous three times a day before meals  insulin lispro (HumaLOG) corrective regimen sliding scale   SubCutaneous at bedtime  levothyroxine 175 MICROGram(s) Oral daily  milrinone Infusion 0.375 MICROgram(s)/kG/Min (10.89 mL/Hr) IV Continuous <Continuous>  pantoprazole    Tablet 40 milliGRAM(s) Oral before breakfast  potassium chloride  20 mEq/100 mL IVPB 20 milliEquivalent(s) IV Intermittent once  rifaximin 550 milliGRAM(s) Oral two times a day  spironolactone 50 milliGRAM(s) Oral two times a day    MEDICATIONS  (PRN):  acetaminophen   Tablet .. 975 milliGRAM(s) Oral every 4 hours PRN Mild Pain (1 - 3)  benzonatate 100 milliGRAM(s) Oral three times a day PRN Cough  guaiFENesin   Syrup  (Sugar-Free) 100 milliGRAM(s) Oral every 6 hours PRN Cough    LABS:                        11.1   6.0   )-----------( 334      ( 22 Nov 2018 07:02 )             34.7     11-22    133<L>  |  87<L>  |  18  ----------------------------<  100<H>  3.1<L>   |  30  |  0.84    Ca    8.5      22 Nov 2018 07:06  Mg     1.4     11-22    TPro  7.2  /  Alb  3.1<L>  /  TBili  5.1<H>  /  DBili  x   /  AST  43<H>  /  ALT  31  /  AlkPhos  305<H>  11-21        CAPILLARY BLOOD GLUCOSE      POCT Blood Glucose.: 209 mg/dL (22 Nov 2018 12:16)  POCT Blood Glucose.: 121 mg/dL (22 Nov 2018 08:26)  POCT Blood Glucose.: 218 mg/dL (21 Nov 2018 22:03)  POCT Blood Glucose.: 158 mg/dL (21 Nov 2018 16:43)          REVIEW OF SYSTEMS:  CONSTITUTIONAL: No fever, weight loss, or fatigue  EYES: No eye pain, visual disturbances, or discharge  ENMT:  No difficulty hearing, tinnitus, vertigo; No sinus or throat pain  NECK: No pain or stiffness  RESPIRATORY: No cough, wheezing, chills or hemoptysis; No shortness of breath  CARDIOVASCULAR: No chest pain, palpitations, dizziness, or leg swelling  GASTROINTESTINAL: No abdominal or epigastric pain. No nausea, vomiting, or hematemesis; No diarrhea or constipation. No melena or hematochezia.  GENITOURINARY: No dysuria, frequency, hematuria, or incontinence  NEUROLOGICAL: No headaches, memory loss, loss of strength, numbness, or tremors      Consultant(s) Notes Reviewed:  [x ] YES  [ ] NO    PHYSICAL EXAM:  GENERAL: NAD, well-groomed, well-developed,not in any distress ,  HEAD:  Atraumatic, Normocephalic  EYES: EOMI, PERRLA, conjunctiva and sclera clear  ENMT: No tonsillar erythema, exudates, or enlargement; Moist mucous membranes, Good dentition, No lesions  NECK: Supple, + JVD, Normal thyroid  NERVOUS SYSTEM:  Alert & Oriented X3, No focal deficit   CHEST/LUNG: Good air entry bilateral with no  rales, rhonchi, wheezing, or rubs  HEART: Regular rate and rhythm; No murmurs, rubs, or gallops  ABDOMEN: Soft, Nontender, Nondistended; Bowel sounds present  EXTREMITIES:  2+ Peripheral Pulses, No clubbing, cyanosis, but  trace edema    Care Discussed with Consultants/Other Providers [ x] YES  [ ] NO

## 2018-11-22 NOTE — PROGRESS NOTE ADULT - SUBJECTIVE AND OBJECTIVE BOX
S: no chest pain or sob    acetaminophen   Tablet .. 975 milliGRAM(s) Oral every 4 hours PRN  aspirin  chewable 81 milliGRAM(s) Oral daily  benzonatate 100 milliGRAM(s) Oral three times a day PRN  Biotene Dry Mouth Oral Rinse 5 milliLiter(s) Swish and Spit two times a day  buMETAnide 3 milliGRAM(s) Oral two times a day  calcitriol   Capsule 0.5 MICROGram(s) Oral daily  calcium acetate 2001 milliGRAM(s) Oral three times a day with meals  calcium carbonate 1250 mG  + Vitamin D (OsCal 500 + D) 2 Tablet(s) Oral three times a day  gabapentin 200 milliGRAM(s) Oral three times a day  guaiFENesin   Syrup  (Sugar-Free) 100 milliGRAM(s) Oral every 6 hours PRN  influenza   Vaccine 0.5 milliLiter(s) IntraMuscular once  insulin lispro (HumaLOG) corrective regimen sliding scale   SubCutaneous three times a day before meals  insulin lispro (HumaLOG) corrective regimen sliding scale   SubCutaneous at bedtime  levothyroxine 175 MICROGram(s) Oral daily  milrinone Infusion 0.375 MICROgram(s)/kG/Min IV Continuous <Continuous>  pantoprazole    Tablet 40 milliGRAM(s) Oral before breakfast  potassium chloride  20 mEq/100 mL IVPB 20 milliEquivalent(s) IV Intermittent once  rifaximin 550 milliGRAM(s) Oral two times a day  spironolactone 50 milliGRAM(s) Oral two times a day                            11.1   6.0   )-----------( 334      ( 22 Nov 2018 07:02 )             34.7       11-22    133<L>  |  87<L>  |  18  ----------------------------<  100<H>  3.1<L>   |  30  |  0.84    Ca    8.5      22 Nov 2018 07:06  Mg     1.4     11-22    TPro  7.2  /  Alb  3.1<L>  /  TBili  5.1<H>  /  DBili  x   /  AST  43<H>  /  ALT  31  /  AlkPhos  305<H>  11-21            T(C): 36.2 (11-22-18 @ 12:19), Max: 36.7 (11-21-18 @ 21:59)  HR: 101 (11-22-18 @ 12:19) (98 - 109)  BP: 102/67 (11-22-18 @ 12:19) (102/67 - 113/83)  RR: 18 (11-22-18 @ 12:19) (18 - 18)  SpO2: 95% (11-22-18 @ 12:19) (95% - 98%)  Wt(kg): --    I&O's Summary    21 Nov 2018 07:01  -  22 Nov 2018 07:00  --------------------------------------------------------  IN: 900 mL / OUT: 3850 mL / NET: -2950 mL            Lymphatic: No lymphadenopathy + edema in LE bilaterally improved   Cardiovascular: Normal S1 S2,RRR, No murmur   Respiratory: Lungs clear to auscultation, normal effort 	  Gastrointestinal:  Soft, distended   Skin: No rashes, No ecchymoses, No cyanosis,     DATA:    TELEMETRY: , SR,     	    < from: TTE with Doppler (w/Cont) (11.07.18 @ 05:53) >  Conclusions:  1. Tethered mitral valve leaflets with normal opening.  Mitral annular calcification and thickened  mitral leaflet  tips Moderate mitral regurgitation.  2. Calcified trileaflet aortic valve with normal opening.  3. Moderately dilated left atrium.  LA volume index = 43  cc/m2.  4. Eccentric left ventricular hypertrophy (dilated left  ventricle with normal relative wall thickness).  5. Severe global left ventricular systolic dysfunction.  Endocardial visualization enhanced with intravenous  injection of Ultrasonic Enhancing Agent (Definity). No LV  thrombus.  Septal flattening consistent with right  ventricular overload.  6. Severe diastolic dysfunction with elevated filling  pressures  7. Severe right atrial enlargement.  8. Right ventricular enlargement with decreased right  ventricular systolic function.  A device wire is noted in  the right heart.  9. Dilated tricuspid annulus with malcoaptation of  tricuspid leaflets. Severe tricuspid regurgitation.  *** No previous Echo exam.  ------------------------------------------------------------------------  Confirmed on  11/7/2018 - 16:40:23 by Haley Koch M.D.    < end of copied text >      < from: Cardiac Cath Lab - Adult (05.25.18 @ 11:46) >  DIAGNOSTIC RECOMMENDATIONS: Right heart catheterization demonstrates  elevated PCWP and low cardiac output. Will transfer patient to the CCU for  inotrope assisted diuresis.  Prepared and signed by  Kunal Muller M.D.  Signed 05/29/2018 16:07:27    < end of copied text >      ASSESSMENT/PLAN: 64 y/o F with PMHx of NICM s/p AICD, HTN, moderate-severe MR, reportedly recent LHC at [place] was negative, thyroid cancer s/p thyroidectomy, DM,  admitted with decompensated acute on chronic systolic heart failure.    -continue with inotrope assisted diuresis per heart failure  -continue with PO bumex - volume status improved   -no ischemic evaluation or interventional workup planned at this time  -dc planning per primary team  -follow up with Dr. Robbins after discharge    Kunal Muller MD

## 2018-11-22 NOTE — PROGRESS NOTE ADULT - SUBJECTIVE AND OBJECTIVE BOX
ELECTROPHYSIOLOGY    SUB: Patient seen and examined this morning. Was walking in the hallway with assistance. Denies SOB/EDDY or CP.    ROS otherwise negative.     tele: NSR, biv pacing    no palpitations, no syncope, no angina      MEDICATIONS  (STANDING):  aspirin  chewable 81 milliGRAM(s) Oral daily  Biotene Dry Mouth Oral Rinse 5 milliLiter(s) Swish and Spit two times a day  buMETAnide 3 milliGRAM(s) Oral two times a day  calcitriol   Capsule 0.5 MICROGram(s) Oral daily  calcium acetate 2001 milliGRAM(s) Oral three times a day with meals  calcium carbonate 1250 mG  + Vitamin D (OsCal 500 + D) 2 Tablet(s) Oral three times a day  gabapentin 200 milliGRAM(s) Oral three times a day  influenza   Vaccine 0.5 milliLiter(s) IntraMuscular once  insulin lispro (HumaLOG) corrective regimen sliding scale   SubCutaneous three times a day before meals  insulin lispro (HumaLOG) corrective regimen sliding scale   SubCutaneous at bedtime  levothyroxine 175 MICROGram(s) Oral daily  milrinone Infusion 0.375 MICROgram(s)/kG/Min (10.89 mL/Hr) IV Continuous <Continuous>  pantoprazole    Tablet 40 milliGRAM(s) Oral before breakfast  rifaximin 550 milliGRAM(s) Oral two times a day  spironolactone 50 milliGRAM(s) Oral two times a day    MEDICATIONS  (PRN):  acetaminophen   Tablet .. 975 milliGRAM(s) Oral every 4 hours PRN Mild Pain (1 - 3)  guaiFENesin   Syrup  (Sugar-Free) 100 milliGRAM(s) Oral every 6 hours PRN Cough      LABS:                        11.1   6.0   )-----------( 334      ( 22 Nov 2018 07:02 )             34.7     Hemoglobin: 11.1 g/dL (11-22 @ 07:02)  Hemoglobin: 10.3 g/dL (11-21 @ 10:39)  Hemoglobin: 10.1 g/dL (11-20 @ 07:49)  Hemoglobin: 9.9 g/dL (11-19 @ 09:05)  Hemoglobin: 10.2 g/dL (11-18 @ 08:11)    11-22    133<L>  |  87<L>  |  18  ----------------------------<  100<H>  3.1<L>   |  30  |  0.84    Ca    8.5      22 Nov 2018 07:06  Mg     1.4     11-22    TPro  7.2  /  Alb  3.1<L>  /  TBili  5.1<H>  /  DBili  x   /  AST  43<H>  /  ALT  31  /  AlkPhos  305<H>  11-21    Creatinine Trend: 0.84<--, 0.92<--, 0.93<--, 0.86<--, 0.85<--, 0.93<--           PHYSICAL EXAM  Vital Signs Last 24 Hrs  T(C): 36.7 (22 Nov 2018 05:02), Max: 36.7 (21 Nov 2018 21:59)  T(F): 98 (22 Nov 2018 05:02), Max: 98 (21 Nov 2018 21:59)  HR: 98 (22 Nov 2018 05:02) (98 - 109)  BP: 111/79 (22 Nov 2018 05:02) (94/64 - 113/83)  BP(mean): --  RR: 18 (22 Nov 2018 05:02) (18 - 18)  SpO2: 98% (22 Nov 2018 05:02) (97% - 98%)    JVP 12  tachy, no murmurs  lungs with rales  soft nt/nd  + 2 edema    device interrogation normal  EKG: NSR, Biv pacing      A/P) She is a pleasant 64 y/o female PMH severe NICM (LVEF 10-15%) who had a [employer] Biv-ICD implanted at [place]. A recent LHC at [place] was also unremarkable. She now returns for with a severe decompensation of her NICM. Her device interrogation demonstrates excellent RA, RV and LV lead function. She denies palpitations nor high voltage therapy.     -Patient hemodynamically stable   -Now on oral Bumex, tolerating well. Plan per nephro is to eventually d/c home with PICC line for milirone admin  -f/u CHF consult for advanced therapies (VAD vs transplant) . Per HF consult patient to f/u as outpatient with HF clinic.  -no need for AAD  -Her biv-icd was recently optimized by me. Future options including turning off BiV pacing to see if she responds better. This can be addressed when she's euvolemic  -f/u with [facility] after discharge    Adele Miller PA-C

## 2018-11-22 NOTE — PROGRESS NOTE ADULT - ASSESSMENT
62yo post-menopausal F w/ pmh of CHF, thyroid ca, HTN, DM, daily ASA user,  c/o vaginal bleeding that she noticed this morning. Pt states she woke up feeling fatigued with abdominal pressure and that when she sat down, she noticed that she had bled through her underwear. Pt has not experienced any bleeding episodes like this in the past. she admits to normal urinary and bowel functions and denies any HA, dizziness, SOB, CP, N/V/D, constipation. Pt has no known uterine pathologies and has not seen a GYN in 15-20 years. Currently Pt reports fatigue and daughter states she has been in this state for the past couple weeks since she was discharged from hospital for CHF exacerbation.      Problem/Plan - 1:  ·  Problem: Acute on chronic systolic congestive heart failure.  Plan: Heart Failure team helping and D/W attending . Changed to PO  Bumex 3mg BID and continuing Milrinone drip as well Spironolactone .  S/P PICC Line.     Problem/Plan - 2:  ·  Problem: Acute renal failure.  Plan: Resolved.   Renal following .     Problem/Plan - 3:  ·  Problem: Hyperkalemia/ Hypokalemia/ hypomagnesemia  .  Plan: Correcting.      Problem/Plan - 4:  ·  Problem: Hyponatremia.  Plan: Correcting . Management per renal.      Problem/Plan - 5:  ·  Problem:  Hypocalcemia.  Plan: Corrected.  Replacing IV and PO ..      Problem/Plan - 6:  Problem:  Jaundice, hepatocellular with abnormal LFT . Plan: Likely from CHF . LFT trending down . Management per GI.     Problem/Plan - 7:  ·  Problem: DM (diabetes mellitus).  Plan: SSI and Lantus .Sugars in good control.      Problem/Plan - 8:  ·  Problem: UTI .  Plan: S/P Abxs .      Problem/Plan - 9:  ·  Problem: Hypothyroidism.  Plan: Synthroid home dose and free T4 pending.       Problem/Plan - 10:  Problem: Vaginal bleeding. Plan; GYn follow up pending and awaiting Endometrial BX.      Problem/Plan - 11:  ·  Problem: Coagulopathy .  Plan: Sec to Liver injury. INR up. Holding DVT prophylaxis as vaginal bleeding.       Problem/Plan - 12:  ·  Problem: Feet pain sec to Diabetic Neuropathy  .  Plan: Resolved . Increased  Neurontin. Normal  B12 and Folate as well TFT.      Problem/Plan - 13:  ·  Problem: Mod to Severe MR and Severe TR .  Plan: Structural Heart team following.      Problem/Plan - 14:  ·  Problem: Nausea with Vomiting .  Plan: PPI and Zofran . GI following. Resolved.     Disposition : DC planning home pending home nurse and medication supply  .

## 2018-11-22 NOTE — PROGRESS NOTE ADULT - SUBJECTIVE AND OBJECTIVE BOX
EP ATTENDING    tele: NSR, biv pacing    no palpitations, no syncope, no angina    acetaminophen   Tablet .. 975 milliGRAM(s) Oral every 4 hours PRN  aspirin  chewable 81 milliGRAM(s) Oral daily  benzonatate 100 milliGRAM(s) Oral three times a day PRN  Biotene Dry Mouth Oral Rinse 5 milliLiter(s) Swish and Spit two times a day  buMETAnide 3 milliGRAM(s) Oral two times a day  calcitriol   Capsule 0.5 MICROGram(s) Oral daily  calcium acetate 2001 milliGRAM(s) Oral three times a day with meals  calcium carbonate 1250 mG  + Vitamin D (OsCal 500 + D) 2 Tablet(s) Oral three times a day  gabapentin 200 milliGRAM(s) Oral three times a day  guaiFENesin   Syrup  (Sugar-Free) 100 milliGRAM(s) Oral every 6 hours PRN  influenza   Vaccine 0.5 milliLiter(s) IntraMuscular once  insulin lispro (HumaLOG) corrective regimen sliding scale   SubCutaneous three times a day before meals  insulin lispro (HumaLOG) corrective regimen sliding scale   SubCutaneous at bedtime  levothyroxine 175 MICROGram(s) Oral daily  milrinone Infusion 0.375 MICROgram(s)/kG/Min IV Continuous <Continuous>  pantoprazole    Tablet 40 milliGRAM(s) Oral before breakfast  rifaximin 550 milliGRAM(s) Oral two times a day  spironolactone 50 milliGRAM(s) Oral two times a day                            11.1   6.0   )-----------( 334      ( 22 Nov 2018 07:02 )             34.7       11-22    133<L>  |  87<L>  |  18  ----------------------------<  100<H>  3.1<L>   |  30  |  0.84    Ca    8.5      22 Nov 2018 07:06  Mg     1.4     11-22    TPro  7.2  /  Alb  3.1<L>  /  TBili  5.1<H>  /  DBili  x   /  AST  43<H>  /  ALT  31  /  AlkPhos  305<H>  11-21            T(C): 36.7 (11-22-18 @ 05:02), Max: 36.7 (11-21-18 @ 21:59)  HR: 98 (11-22-18 @ 05:02) (98 - 109)  BP: 111/79 (11-22-18 @ 05:02) (94/64 - 113/83)  RR: 18 (11-22-18 @ 05:02) (18 - 18)  SpO2: 98% (11-22-18 @ 05:02) (97% - 98%)  Wt(kg): --    JVP 8  tachy, no murmurs  lungs with rales  soft nt/nd  + 2 edema    device interrogation normal  EKG: NSR, Biv pacing      A/P) She is a pleasant 64 y/o female PMH severe NICM (LVEF 10-15%) who had a [employer]onic Biv-ICD implanted at [place]. A recent LHC at [place] was also unremarkable. She now returns for with a severe decompensation of her NICM. Her device interrogation demonstrates excellent RA, RV and LV lead function. She denies palpitations nor high voltage therapy.     -will defer medical management of CHF to cardiology  -f/u CHF consult for advanced therapies (VAD vs transplant)  -no need for AAD  -Her biv-icd was recently optimized by me. Future options including turning off BiV pacing to see if she responds better. This can be addressed when as an outpatient  -f/u with [facility] after discharge  -no further inpatient EP workup needed  -d/c planning as per primary team

## 2018-11-22 NOTE — PROGRESS NOTE ADULT - ATTENDING COMMENTS
Agree with above assessment and plan as outlined above.    -  Cont Milrinone and PO Bumex    Augusto Grimes MD, FACC

## 2018-11-23 VITALS
SYSTOLIC BLOOD PRESSURE: 102 MMHG | OXYGEN SATURATION: 96 % | RESPIRATION RATE: 18 BRPM | HEART RATE: 64 BPM | TEMPERATURE: 98 F | DIASTOLIC BLOOD PRESSURE: 67 MMHG

## 2018-11-23 LAB
ALBUMIN SERPL ELPH-MCNC: 3.3 G/DL — SIGNIFICANT CHANGE UP (ref 3.3–5)
ALP SERPL-CCNC: 321 U/L — HIGH (ref 40–120)
ALT FLD-CCNC: 24 U/L — SIGNIFICANT CHANGE UP (ref 10–45)
ANION GAP SERPL CALC-SCNC: 17 MMOL/L — SIGNIFICANT CHANGE UP (ref 5–17)
AST SERPL-CCNC: 37 U/L — SIGNIFICANT CHANGE UP (ref 10–40)
BILIRUB SERPL-MCNC: 4.6 MG/DL — HIGH (ref 0.2–1.2)
BUN SERPL-MCNC: 21 MG/DL — SIGNIFICANT CHANGE UP (ref 7–23)
CALCIUM SERPL-MCNC: 8.9 MG/DL — SIGNIFICANT CHANGE UP (ref 8.4–10.5)
CHLORIDE SERPL-SCNC: 87 MMOL/L — LOW (ref 96–108)
CO2 SERPL-SCNC: 29 MMOL/L — SIGNIFICANT CHANGE UP (ref 22–31)
CREAT SERPL-MCNC: 1.01 MG/DL — SIGNIFICANT CHANGE UP (ref 0.5–1.3)
GLUCOSE BLDC GLUCOMTR-MCNC: 153 MG/DL — HIGH (ref 70–99)
GLUCOSE BLDC GLUCOMTR-MCNC: 174 MG/DL — HIGH (ref 70–99)
GLUCOSE BLDC GLUCOMTR-MCNC: 193 MG/DL — HIGH (ref 70–99)
GLUCOSE SERPL-MCNC: 103 MG/DL — HIGH (ref 70–99)
POTASSIUM SERPL-MCNC: 3.5 MMOL/L — SIGNIFICANT CHANGE UP (ref 3.5–5.3)
POTASSIUM SERPL-SCNC: 3.5 MMOL/L — SIGNIFICANT CHANGE UP (ref 3.5–5.3)
PROT SERPL-MCNC: 7.4 G/DL — SIGNIFICANT CHANGE UP (ref 6–8.3)
SODIUM SERPL-SCNC: 133 MMOL/L — LOW (ref 135–145)

## 2018-11-23 RX ORDER — POTASSIUM CHLORIDE 20 MEQ
40 PACKET (EA) ORAL ONCE
Qty: 0 | Refills: 0 | Status: COMPLETED | OUTPATIENT
Start: 2018-11-23 | End: 2018-11-23

## 2018-11-23 RX ORDER — POTASSIUM CHLORIDE 20 MEQ
1 PACKET (EA) ORAL
Qty: 30 | Refills: 0 | OUTPATIENT
Start: 2018-11-23 | End: 2018-12-22

## 2018-11-23 RX ORDER — POTASSIUM CHLORIDE 20 MEQ
20 PACKET (EA) ORAL DAILY
Qty: 0 | Refills: 0 | Status: DISCONTINUED | OUTPATIENT
Start: 2018-11-23 | End: 2018-11-23

## 2018-11-23 RX ORDER — MAGNESIUM OXIDE 400 MG ORAL TABLET 241.3 MG
1 TABLET ORAL
Qty: 60 | Refills: 0 | OUTPATIENT
Start: 2018-11-23 | End: 2018-12-22

## 2018-11-23 RX ORDER — MAGNESIUM OXIDE 400 MG ORAL TABLET 241.3 MG
400 TABLET ORAL
Qty: 0 | Refills: 0 | Status: DISCONTINUED | OUTPATIENT
Start: 2018-11-23 | End: 2018-11-23

## 2018-11-23 RX ORDER — MILRINONE LACTATE 1 MG/ML
0.38 INJECTION, SOLUTION INTRAVENOUS
Qty: 30 | Refills: 0 | OUTPATIENT
Start: 2018-11-23 | End: 2018-12-22

## 2018-11-23 RX ADMIN — CALCITRIOL 0.5 MICROGRAM(S): 0.5 CAPSULE ORAL at 12:55

## 2018-11-23 RX ADMIN — GABAPENTIN 200 MILLIGRAM(S): 400 CAPSULE ORAL at 13:33

## 2018-11-23 RX ADMIN — Medication 175 MICROGRAM(S): at 06:11

## 2018-11-23 RX ADMIN — Medication 1: at 12:53

## 2018-11-23 RX ADMIN — Medication 40 MILLIEQUIVALENT(S): at 13:33

## 2018-11-23 RX ADMIN — Medication 5 MILLILITER(S): at 06:15

## 2018-11-23 RX ADMIN — PANTOPRAZOLE SODIUM 40 MILLIGRAM(S): 20 TABLET, DELAYED RELEASE ORAL at 06:14

## 2018-11-23 RX ADMIN — Medication 2 TABLET(S): at 06:11

## 2018-11-23 RX ADMIN — Medication 2001 MILLIGRAM(S): at 12:54

## 2018-11-23 RX ADMIN — Medication 2 TABLET(S): at 17:19

## 2018-11-23 RX ADMIN — Medication 81 MILLIGRAM(S): at 13:13

## 2018-11-23 RX ADMIN — Medication 1: at 08:54

## 2018-11-23 RX ADMIN — Medication 2001 MILLIGRAM(S): at 17:18

## 2018-11-23 RX ADMIN — SPIRONOLACTONE 50 MILLIGRAM(S): 25 TABLET, FILM COATED ORAL at 06:11

## 2018-11-23 RX ADMIN — Medication 2001 MILLIGRAM(S): at 08:55

## 2018-11-23 RX ADMIN — Medication 1: at 17:17

## 2018-11-23 RX ADMIN — Medication 20 MILLIEQUIVALENT(S): at 13:13

## 2018-11-23 RX ADMIN — GABAPENTIN 200 MILLIGRAM(S): 400 CAPSULE ORAL at 06:12

## 2018-11-23 RX ADMIN — BUMETANIDE 3 MILLIGRAM(S): 0.25 INJECTION INTRAMUSCULAR; INTRAVENOUS at 06:11

## 2018-11-23 NOTE — PROGRESS NOTE ADULT - SUBJECTIVE AND OBJECTIVE BOX
No pain, no shortness of breath      VITAL:  T(C): , Max: 36.8 (11-22-18 @ 21:28)  T(F): , Max: 98.3 (11-22-18 @ 21:28)  HR: 105 (11-23-18 @ 04:03)  BP: 103/72 (11-23-18 @ 04:03)  RR: 18 (11-23-18 @ 04:03)  SpO2: 96% (11-23-18 @ 04:03)      PHYSICAL EXAM:    Constitutional: NAD; Alert  HEENT:  NCAT; DMM  Neck: No JVD; supple  Respiratory: CTA-b/l  Cardiac: RRR s1s2  Gastrointestinal: BS+, soft, NT/ND  Urologic: No arteaga  Extremities: No peripheral edema  Back: No CVAT b/l    LABS:                        11.1   6.0   )-----------( 334      ( 22 Nov 2018 07:02 )             34.7     Na(133)/K(3.5)/Cl(87)/HCO3(29)/BUN(21)/Cr(1.01)Glu(103)/Ca(8.9)/Mg(--)/PO4(--)    11-23 @ 07:15  Na(133)/K(3.1)/Cl(87)/HCO3(30)/BUN(18)/Cr(0.84)Glu(100)/Ca(8.5)/Mg(1.4)/PO4(--)    11-22 @ 07:06  Na(130)/K(3.9)/Cl(86)/HCO3(29)/BUN(18)/Cr(0.92)Glu(251)/Ca(8.5)/Mg(--)/PO4(--)    11-21 @ 07:31  Na(132)/K(3.2)/Cl(84)/HCO3(31)/BUN(16)/Cr(0.93)Glu(239)/Ca(8.7)/Mg(1.7)/PO4(--)    11-20 @ 18:10      ASSESSMENT: 63F w/ HTN, DM2, thyroid CA s/p resection, acquired hypoparathyroidism, and severe HFrEF, 11/6/18 a/w vaginal bleeding/MAGNUS/electrolyte derangements/ acute on chronic HFrEF    (1)Renal - MAGNUS - Prerenally mediated from decompensated CHF - Resolved. Renal function intact     (2)Hypokalemia - due to diuresis; improved s/p repletion 11/22. Indicated for standing repletion.    (3)Hypomagnesemia - in association with diuresis; repleted 11/22. Indicated for standing repletion.    (4)Bone - hypocalcemia/hyperphosphatemia due to hypoparathyroidism (s/p iatrogenic parathyroidectomy). Very chronic. Ca up to 8.9mg/dL as of today; we could cut back a bit on her calcium tabs.    (5)CV - diuresing well, volume status much improved relative to admission    RECOMMEND:  (1)Reduce CaCO3 from 2tab TID to 2tab BID  (2)Add MgO 400mg po BID  (3)Add KCl 20meq po QD  (4)Diuretics as ordered  (5)Primacor gtt per Cardiology/CHF team          Brian Henry MD  [facility]  [phone] No pain, no shortness of breath      VITAL:  T(C): , Max: 36.8 (11-22-18 @ 21:28)  T(F): , Max: 98.3 (11-22-18 @ 21:28)  HR: 105 (11-23-18 @ 04:03)  BP: 103/72 (11-23-18 @ 04:03)  RR: 18 (11-23-18 @ 04:03)  SpO2: 96% (11-23-18 @ 04:03)      PHYSICAL EXAM:    Constitutional: NAD; Alert  HEENT:  NCAT; DMM  Neck: No JVD; supple  Respiratory: CTA-b/l  Cardiac: RRR s1s2  Gastrointestinal: BS+, soft, NT/ND  Urologic: No arteaga  Extremities: 1+ b/l LE edema  Back: No CVAT b/l    LABS:                        11.1   6.0   )-----------( 334      ( 22 Nov 2018 07:02 )             34.7     Na(133)/K(3.5)/Cl(87)/HCO3(29)/BUN(21)/Cr(1.01)Glu(103)/Ca(8.9)/Mg(--)/PO4(--)    11-23 @ 07:15  Na(133)/K(3.1)/Cl(87)/HCO3(30)/BUN(18)/Cr(0.84)Glu(100)/Ca(8.5)/Mg(1.4)/PO4(--)    11-22 @ 07:06  Na(130)/K(3.9)/Cl(86)/HCO3(29)/BUN(18)/Cr(0.92)Glu(251)/Ca(8.5)/Mg(--)/PO4(--)    11-21 @ 07:31  Na(132)/K(3.2)/Cl(84)/HCO3(31)/BUN(16)/Cr(0.93)Glu(239)/Ca(8.7)/Mg(1.7)/PO4(--)    11-20 @ 18:10      ASSESSMENT: 63F w/ HTN, DM2, thyroid CA s/p resection, acquired hypoparathyroidism, and severe HFrEF, 11/6/18 a/w vaginal bleeding/MAGNUS/electrolyte derangements/ acute on chronic HFrEF    (1)Renal - MAGNUS - Prerenally mediated from decompensated CHF - Resolved. Renal function intact     (2)Hypokalemia - due to diuresis; improved s/p repletion 11/22. Indicated for standing repletion.    (3)Hypomagnesemia - in association with diuresis; repleted 11/22. Indicated for standing repletion.    (4)Bone - hypocalcemia/hyperphosphatemia due to hypoparathyroidism (s/p iatrogenic parathyroidectomy). Very chronic. Ca up to 8.9mg/dL as of today; we could cut back a bit on her calcium tabs.    (5)CV - diuresing well, volume status much improved relative to admission    RECOMMEND:  (1)Reduce CaCO3 from 2tab TID to 2tab BID  (2)Add MgO 400mg po BID  (3)Add KCl 20meq po QD  (4)Diuretics as ordered  (5)Primacor gtt per Cardiology/CHF team  (6)No renal objection to d/c - would recheck bloodwork in 1-2 weeks; could f/u at my office in 1-2 months        Brian Henry MD  [facility]  [phone]

## 2018-11-23 NOTE — PROGRESS NOTE ADULT - PROBLEM SELECTOR PROBLEM 2
Vaginal bleeding
Vaginal bleeding
Acute renal failure
Vaginal bleeding
Postoperative hypothyroidism
Postoperative hypothyroidism
Vaginal bleeding

## 2018-11-23 NOTE — PROGRESS NOTE ADULT - SUBJECTIVE AND OBJECTIVE BOX
INTERVAL HPI/OVERNIGHT EVENTS: No new concerns . waiting for home Nurse and supplies.   Vital Signs Last 24 Hrs  T(C): 36.6 (23 Nov 2018 12:06), Max: 36.8 (22 Nov 2018 21:28)  T(F): 97.9 (23 Nov 2018 12:06), Max: 98.3 (22 Nov 2018 21:28)  HR: 64 (23 Nov 2018 12:06) (64 - 110)  BP: 102/67 (23 Nov 2018 12:06) (102/67 - 117/82)  BP(mean): --  RR: 18 (23 Nov 2018 12:06) (18 - 18)  SpO2: 96% (23 Nov 2018 12:06) (96% - 96%)  I&O's Summary    22 Nov 2018 07:01  -  23 Nov 2018 07:00  --------------------------------------------------------  IN: 1312 mL / OUT: 2550 mL / NET: -1238 mL      MEDICATIONS  (STANDING):  aspirin  chewable 81 milliGRAM(s) Oral daily  Biotene Dry Mouth Oral Rinse 5 milliLiter(s) Swish and Spit two times a day  buMETAnide 3 milliGRAM(s) Oral two times a day  calcitriol   Capsule 0.5 MICROGram(s) Oral daily  calcium acetate 2001 milliGRAM(s) Oral three times a day with meals  calcium carbonate 1250 mG  + Vitamin D (OsCal 500 + D) 2 Tablet(s) Oral two times a day  gabapentin 200 milliGRAM(s) Oral three times a day  influenza   Vaccine 0.5 milliLiter(s) IntraMuscular once  insulin lispro (HumaLOG) corrective regimen sliding scale   SubCutaneous three times a day before meals  insulin lispro (HumaLOG) corrective regimen sliding scale   SubCutaneous at bedtime  levothyroxine 175 MICROGram(s) Oral daily  magnesium oxide 400 milliGRAM(s) Oral two times a day with meals  milrinone Infusion 0.375 MICROgram(s)/kG/Min (10.89 mL/Hr) IV Continuous <Continuous>  pantoprazole    Tablet 40 milliGRAM(s) Oral before breakfast  potassium chloride    Tablet ER 20 milliEquivalent(s) Oral daily  rifaximin 550 milliGRAM(s) Oral two times a day  spironolactone 50 milliGRAM(s) Oral two times a day    MEDICATIONS  (PRN):  acetaminophen   Tablet .. 975 milliGRAM(s) Oral every 4 hours PRN Mild Pain (1 - 3)  benzonatate 100 milliGRAM(s) Oral three times a day PRN Cough  guaiFENesin   Syrup  (Sugar-Free) 100 milliGRAM(s) Oral every 6 hours PRN Cough    LABS:                        11.1   6.0   )-----------( 334      ( 22 Nov 2018 07:02 )             34.7     11-23    133<L>  |  87<L>  |  21  ----------------------------<  103<H>  3.5   |  29  |  1.01    Ca    8.9      23 Nov 2018 07:15  Mg     1.4     11-22    TPro  7.4  /  Alb  3.3  /  TBili  4.6<H>  /  DBili  x   /  AST  37  /  ALT  24  /  AlkPhos  321<H>  11-23        CAPILLARY BLOOD GLUCOSE      POCT Blood Glucose.: 193 mg/dL (23 Nov 2018 12:19)  POCT Blood Glucose.: 174 mg/dL (23 Nov 2018 08:16)  POCT Blood Glucose.: 201 mg/dL (22 Nov 2018 21:30)  POCT Blood Glucose.: 162 mg/dL (22 Nov 2018 16:38)          REVIEW OF SYSTEMS:  CONSTITUTIONAL: No fever, weight loss, or fatigue  EYES: No eye pain, visual disturbances, or discharge  ENMT:  No difficulty hearing, tinnitus, vertigo; No sinus or throat pain  NECK: No pain or stiffness  RESPIRATORY: No cough, wheezing, chills or hemoptysis; No shortness of breath  CARDIOVASCULAR: No chest pain, palpitations, dizziness, or leg swelling  GASTROINTESTINAL: No abdominal or epigastric pain. No nausea, vomiting, or hematemesis; No diarrhea or constipation. No melena or hematochezia.  GENITOURINARY: No dysuria, frequency, hematuria, or incontinence  NEUROLOGICAL: No headaches, memory loss, loss of strength, numbness, or tremors      Consultant(s) Notes Reviewed:  [x ] YES  [ ] NO    PHYSICAL EXAM:  GENERAL: NAD, well-groomed, well-developed,not in any distress ,  HEAD:  Atraumatic, Normocephalic  EYES: EOMI, PERRLA, conjunctiva and sclera clear  ENMT: No tonsillar erythema, exudates, or enlargement; Moist mucous membranes, Good dentition, No lesions  NECK: Supple, + JVD, Normal thyroid  NERVOUS SYSTEM:  Alert & Oriented X3, No focal deficit   CHEST/LUNG: Good air entry bilateral with no  rales, rhonchi, wheezing, or rubs  HEART: Regular rate and rhythm; No murmurs, rubs, or gallops  ABDOMEN: Soft, Nontender, Nondistended; Bowel sounds present  EXTREMITIES:  2+ Peripheral Pulses, No clubbing, cyanosis, or edema  Care Discussed with Consultants/Other Providers [ x] YES  [ ] NO

## 2018-11-23 NOTE — PROGRESS NOTE ADULT - ASSESSMENT
64yo post-menopausal F w/ pmh of CHF, thyroid ca, HTN, DM, daily ASA user,  c/o vaginal bleeding that she noticed this morning. Pt states she woke up feeling fatigued with abdominal pressure and that when she sat down, she noticed that she had bled through her underwear. Pt has not experienced any bleeding episodes like this in the past. she admits to normal urinary and bowel functions and denies any HA, dizziness, SOB, CP, N/V/D, constipation. Pt has no known uterine pathologies and has not seen a GYN in 15-20 years. Currently Pt reports fatigue and daughter states she has been in this state for the past couple weeks since she was discharged from hospital for CHF exacerbation.      Problem/Plan - 1:  ·  Problem: Acute on chronic systolic congestive heart failure.  Plan: Doing well . Heart Failure team helping and D/W attending . Changed to PO  Bumex 3mg BID and continuing Milrinone drip as well Spironolactone .  S/P PICC Line.     Problem/Plan - 2:  ·  Problem: Acute renal failure.  Plan: Resolved.   Renal following .     Problem/Plan - 3:  ·  Problem: Hyperkalemia/ Hypokalemia/ hypomagnesemia  .  Plan: Corrected .      Problem/Plan - 4:  ·  Problem: Hyponatremia.  Plan: Correcting . Management per renal.      Problem/Plan - 5:  ·  Problem:  Hypocalcemia.  Plan: Corrected.  Replacing IV and PO ..      Problem/Plan - 6:  Problem:  Jaundice, hepatocellular with abnormal LFT . Plan: Likely from CHF . LFT trending down . Management per GI.     Problem/Plan - 7:  ·  Problem: DM (diabetes mellitus).  Plan: SSI and Lantus .Sugars in good control.      Problem/Plan - 8:  ·  Problem: UTI .  Plan: S/P Abxs .      Problem/Plan - 9:  ·  Problem: Hypothyroidism.  Plan: Synthroid home dose and free T4 pending.       Problem/Plan - 10:  Problem: Vaginal bleeding. Plan; GYn follow up pending and awaiting Endometrial BX.      Problem/Plan - 11:  ·  Problem: Coagulopathy .  Plan: Sec to Liver injury. INR up. Holding DVT prophylaxis as vaginal bleeding.       Problem/Plan - 12:  ·  Problem: Feet pain sec to Diabetic Neuropathy  .  Plan: Resolved . Increased  Neurontin. Normal  B12 and Folate as well TFT.      Problem/Plan - 13:  ·  Problem: Mod to Severe MR and Severe TR .  Plan: Structural Heart team following.      Problem/Plan - 14:  ·  Problem: Nausea with Vomiting .  Plan: PPI and Zofran . GI following. Resolved.     Disposition : DC planning home pending home nurse and medication supply  .

## 2018-11-23 NOTE — PROGRESS NOTE ADULT - ATTENDING COMMENTS
Agree with above.   No further inpatient interventional cardiac workup needed at this time.   diuresis and inotropes per heart failure and cardiology    Kunal Muller MD

## 2018-11-23 NOTE — PROGRESS NOTE ADULT - NSHPATTENDINGPLANDISCUSS_GEN_ALL_CORE
PA
pt and NP
pt and PA
pts daughter
pt and NP
pt and nP
pt and her 
NAIN Hart
CCU team & Dr. uMller
team, family members
team

## 2018-11-23 NOTE — PROGRESS NOTE ADULT - SUBJECTIVE AND OBJECTIVE BOX
Patient with no anginal chest pain or shortness of breath      MEDICATIONS  (STANDING):  aspirin  chewable 81 milliGRAM(s) Oral daily  Biotene Dry Mouth Oral Rinse 5 milliLiter(s) Swish and Spit two times a day  buMETAnide 3 milliGRAM(s) Oral two times a day  calcitriol   Capsule 0.5 MICROGram(s) Oral daily  calcium acetate 2001 milliGRAM(s) Oral three times a day with meals  calcium carbonate 1250 mG  + Vitamin D (OsCal 500 + D) 2 Tablet(s) Oral two times a day  gabapentin 200 milliGRAM(s) Oral three times a day  influenza   Vaccine 0.5 milliLiter(s) IntraMuscular once  insulin lispro (HumaLOG) corrective regimen sliding scale   SubCutaneous three times a day before meals  insulin lispro (HumaLOG) corrective regimen sliding scale   SubCutaneous at bedtime  levothyroxine 175 MICROGram(s) Oral daily  magnesium oxide 400 milliGRAM(s) Oral two times a day with meals  milrinone Infusion 0.375 MICROgram(s)/kG/Min (10.89 mL/Hr) IV Continuous <Continuous>  pantoprazole    Tablet 40 milliGRAM(s) Oral before breakfast  potassium chloride    Tablet ER 20 milliEquivalent(s) Oral daily  rifaximin 550 milliGRAM(s) Oral two times a day  spironolactone 50 milliGRAM(s) Oral two times a day    MEDICATIONS  (PRN):  acetaminophen   Tablet .. 975 milliGRAM(s) Oral every 4 hours PRN Mild Pain (1 - 3)  benzonatate 100 milliGRAM(s) Oral three times a day PRN Cough  guaiFENesin   Syrup  (Sugar-Free) 100 milliGRAM(s) Oral every 6 hours PRN Cough      LABS:                        11.1   6.0   )-----------( 334      ( 22 Nov 2018 07:02 )             34.7     Hemoglobin: 11.1 g/dL (11-22 @ 07:02)  Hemoglobin: 10.3 g/dL (11-21 @ 10:39)  Hemoglobin: 10.1 g/dL (11-20 @ 07:49)  Hemoglobin: 9.9 g/dL (11-19 @ 09:05)    11-23    133<L>  |  87<L>  |  21  ----------------------------<  103<H>  3.5   |  29  |  1.01    Ca    8.9      23 Nov 2018 07:15  Mg     1.4     11-22    TPro  7.4  /  Alb  3.3  /  TBili  4.6<H>  /  DBili  x   /  AST  37  /  ALT  24  /  AlkPhos  321<H>  11-23    Creatinine Trend: 1.01<--, 0.84<--, 0.92<--, 0.93<--, 0.86<--, 0.85<--           PHYSICAL EXAM  Vital Signs Last 24 Hrs  T(C): 36.8 (23 Nov 2018 04:03), Max: 36.8 (22 Nov 2018 21:28)  T(F): 98.3 (23 Nov 2018 04:03), Max: 98.3 (22 Nov 2018 21:28)  HR: 105 (23 Nov 2018 04:03) (101 - 110)  BP: 103/72 (23 Nov 2018 04:03) (102/67 - 117/82)  BP(mean): --  RR: 18 (23 Nov 2018 04:03) (18 - 18)  SpO2: 96% (23 Nov 2018 04:03) (95% - 96%)        Lymphatic: No lymphadenopathy + edema in LE bilaterally   Cardiovascular: Normal S1 S2,RRR, No murmur   Respiratory: Lungs clear to auscultation, normal effort 	  Gastrointestinal:  Soft, distended   Skin: No rashes, No ecchymoses, No cyanosis,     DATA:    TELEMETRY: 	      	    < from: TTE with Doppler (w/Cont) (11.07.18 @ 05:53) >  Conclusions:  1. Tethered mitral valve leaflets with normal opening.  Mitral annular calcification and thickened  mitral leaflet  tips Moderate mitral regurgitation.  2. Calcified trileaflet aortic valve with normal opening.  3. Moderately dilated left atrium.  LA volume index = 43  cc/m2.  4. Eccentric left ventricular hypertrophy (dilated left  ventricle with normal relative wall thickness).  5. Severe global left ventricular systolic dysfunction.  Endocardial visualization enhanced with intravenous  injection of Ultrasonic Enhancing Agent (Definity). No LV  thrombus.  Septal flattening consistent with right  ventricular overload.  6. Severe diastolic dysfunction with elevated filling  pressures  7. Severe right atrial enlargement.  8. Right ventricular enlargement with decreased right  ventricular systolic function.  A device wire is noted in  the right heart.  9. Dilated tricuspid annulus with malcoaptation of  tricuspid leaflets. Severe tricuspid regurgitation.  *** No previous Echo exam.  ------------------------------------------------------------------------  Confirmed on  11/7/2018 - 16:40:23 by Haley Koch M.D.    < end of copied text >      < from: Cardiac Cath Lab - Adult (05.25.18 @ 11:46) >  DIAGNOSTIC RECOMMENDATIONS: Right heart catheterization demonstrates  elevated PCWP and low cardiac output. Will transfer patient to the CCU for  inotrope assisted diuresis.  Prepared and signed by  Kunal Muller M.D.  Signed 05/29/2018 16:07:27    < end of copied text >      ASSESSMENT/PLAN: 62 y/o F with PMHx of NICM s/p AICD, HTN, moderate-severe MR, reportedly recent LHC at [place] was negative, thyroid cancer s/p thyroidectomy, DM,  admitted with decompensated acute on chronic systolic heart failure.    - s/p PICC for home milrinone  - Switched to PO Bumex  -Renal noted - Na  stable at 133  ; electrolyte management per renal   - D/C planning per primary team   - f/u with Dr Robbins December 4th, @ 1045am for cardiology

## 2018-11-23 NOTE — PROGRESS NOTE ADULT - SUBJECTIVE AND OBJECTIVE BOX
Interval Events: Pt seen and examined. No acute overnight events.  Pt denies abdominal pain, n/v  Tolerating PO.       MEDICATIONS  (STANDING):  aspirin  chewable 81 milliGRAM(s) Oral daily  Biotene Dry Mouth Oral Rinse 5 milliLiter(s) Swish and Spit two times a day  buMETAnide 3 milliGRAM(s) Oral two times a day  calcitriol   Capsule 0.5 MICROGram(s) Oral daily  calcium acetate 2001 milliGRAM(s) Oral three times a day with meals  calcium carbonate 1250 mG  + Vitamin D (OsCal 500 + D) 2 Tablet(s) Oral two times a day  gabapentin 200 milliGRAM(s) Oral three times a day  influenza   Vaccine 0.5 milliLiter(s) IntraMuscular once  insulin lispro (HumaLOG) corrective regimen sliding scale   SubCutaneous three times a day before meals  insulin lispro (HumaLOG) corrective regimen sliding scale   SubCutaneous at bedtime  levothyroxine 175 MICROGram(s) Oral daily  magnesium oxide 400 milliGRAM(s) Oral two times a day with meals  milrinone Infusion 0.375 MICROgram(s)/kG/Min (10.89 mL/Hr) IV Continuous <Continuous>  pantoprazole    Tablet 40 milliGRAM(s) Oral before breakfast  potassium chloride    Tablet ER 20 milliEquivalent(s) Oral daily  rifaximin 550 milliGRAM(s) Oral two times a day  spironolactone 50 milliGRAM(s) Oral two times a day    MEDICATIONS  (PRN):  acetaminophen   Tablet .. 975 milliGRAM(s) Oral every 4 hours PRN Mild Pain (1 - 3)  benzonatate 100 milliGRAM(s) Oral three times a day PRN Cough  guaiFENesin   Syrup  (Sugar-Free) 100 milliGRAM(s) Oral every 6 hours PRN Cough      Allergies    Isordil (Headache)  penicillin (Rash)    Intolerances        Review of Systems:    General:  No wt loss, fevers, chills, night sweats,fatigue,   Eyes:  Good vision, no reported pain  ENT:  No sore throat, pain, runny nose, dysphagia  CV:  No pain, palpitations, hypo/hypertension  Resp:  No dyspnea, cough, tachypnea, wheezing  GI:  No pain, No nausea, No vomiting, No diarrhea, No constipation, No weight loss, No fever, No pruritis, No rectal bleeding, No melena, No dysphagia  :  No pain, bleeding, incontinence, nocturia  Muscle:  No pain, weakness  Neuro:  No weakness, tingling, memory problems  Psych:  No fatigue, insomnia, mood problems, depression  Endocrine:  No polyuria, polydypsia, cold/heat intolerance  Heme:  No petechiae, ecchymosis, easy bruisability  Skin:  No rash, tattoos, scars, edema      Vital Signs Last 24 Hrs  T(C): 36.8 (23 Nov 2018 04:03), Max: 36.8 (22 Nov 2018 21:28)  T(F): 98.3 (23 Nov 2018 04:03), Max: 98.3 (22 Nov 2018 21:28)  HR: 105 (23 Nov 2018 04:03) (101 - 110)  BP: 103/72 (23 Nov 2018 04:03) (102/67 - 117/82)  BP(mean): --  RR: 18 (23 Nov 2018 04:03) (18 - 18)  SpO2: 96% (23 Nov 2018 04:03) (95% - 96%)    PHYSICAL EXAM:    Constitutional: NAD, well-developed  HEENT: EOMI, throat clear  Neck: No LAD, supple  Respiratory: CTA and P  Cardiovascular: S1 and S2, RRR, no M  Gastrointestinal: BS+, soft, NT/ND, neg HSM,  Extremities: No peripheral edema, neg clubing, cyanosis  Vascular: 2+ peripheral pulses  Neurological: A/O x 3, no focal deficits  Psychiatric: Normal mood, normal affect  Skin: No rashes      LABS:                        11.1   6.0   )-----------( 334      ( 22 Nov 2018 07:02 )             34.7     11-23    133<L>  |  87<L>  |  21  ----------------------------<  103<H>  3.5   |  29  |  1.01    Ca    8.9      23 Nov 2018 07:15  Mg     1.4     11-22    TPro  7.4  /  Alb  3.3  /  TBili  4.6<H>  /  DBili  x   /  AST  37  /  ALT  24  /  AlkPhos  321<H>  11-23          RADIOLOGY & ADDITIONAL TESTS:

## 2018-11-23 NOTE — PROGRESS NOTE ADULT - PROBLEM SELECTOR PLAN 3
likely due to Hypervolemic state with low cardiac output, has improved with diuresis and inotropic therapy
- renal following, appreciate ongoing recs
- renal following, appreciate ongoing recs  - KCl and magnesium repletion as ordered
Diuresing and renal helping.
Monitor glucose on correction scale.  Goal glucose 100-180  Adjust as needed.
Monitor glucose on correction scale.  Goal glucose 100-180  Adjust as needed.
likely due to Hypervolemic state with low cardiac output, has improved with diuresis and inotropic therapy

## 2018-11-23 NOTE — PROGRESS NOTE ADULT - ATTENDING COMMENTS
Agree with above assessment and plan as outlined above.    - Monitor electrolytes  - Keep net negative    Augusto Grimes MD, FACC

## 2018-11-23 NOTE — PROGRESS NOTE ADULT - REASON FOR ADMISSION
SOB, Vaginal bleeding and weakness.

## 2018-11-23 NOTE — PROGRESS NOTE ADULT - SUBJECTIVE AND OBJECTIVE BOX
Interventional Cardiology    Sub: patient seen and examined at the bedside. Denies CP or SOB. Denies edema.     ROS otherwise negative.      MEDICATIONS  (STANDING):  aspirin  chewable 81 milliGRAM(s) Oral daily  Biotene Dry Mouth Oral Rinse 5 milliLiter(s) Swish and Spit two times a day  buMETAnide 3 milliGRAM(s) Oral two times a day  calcitriol   Capsule 0.5 MICROGram(s) Oral daily  calcium acetate 2001 milliGRAM(s) Oral three times a day with meals  calcium carbonate 1250 mG  + Vitamin D (OsCal 500 + D) 2 Tablet(s) Oral two times a day  gabapentin 200 milliGRAM(s) Oral three times a day  influenza   Vaccine 0.5 milliLiter(s) IntraMuscular once  insulin lispro (HumaLOG) corrective regimen sliding scale   SubCutaneous three times a day before meals  insulin lispro (HumaLOG) corrective regimen sliding scale   SubCutaneous at bedtime  levothyroxine 175 MICROGram(s) Oral daily  magnesium oxide 400 milliGRAM(s) Oral two times a day with meals  milrinone Infusion 0.375 MICROgram(s)/kG/Min (10.89 mL/Hr) IV Continuous <Continuous>  pantoprazole    Tablet 40 milliGRAM(s) Oral before breakfast  potassium chloride    Tablet ER 20 milliEquivalent(s) Oral daily  rifaximin 550 milliGRAM(s) Oral two times a day  spironolactone 50 milliGRAM(s) Oral two times a day    MEDICATIONS  (PRN):  acetaminophen   Tablet .. 975 milliGRAM(s) Oral every 4 hours PRN Mild Pain (1 - 3)  benzonatate 100 milliGRAM(s) Oral three times a day PRN Cough  guaiFENesin   Syrup  (Sugar-Free) 100 milliGRAM(s) Oral every 6 hours PRN Cough      LABS:                        11.1   6.0   )-----------( 334      ( 22 Nov 2018 07:02 )             34.7     Hemoglobin: 11.1 g/dL (11-22 @ 07:02)  Hemoglobin: 10.3 g/dL (11-21 @ 10:39)  Hemoglobin: 10.1 g/dL (11-20 @ 07:49)  Hemoglobin: 9.9 g/dL (11-19 @ 09:05)    11-23    133<L>  |  87<L>  |  21  ----------------------------<  103<H>  3.5   |  29  |  1.01    Ca    8.9      23 Nov 2018 07:15  Mg     1.4     11-22    TPro  7.4  /  Alb  3.3  /  TBili  4.6<H>  /  DBili  x   /  AST  37  /  ALT  24  /  AlkPhos  321<H>  11-23    Creatinine Trend: 1.01<--, 0.84<--, 0.92<--, 0.93<--, 0.86<--, 0.85<--           PHYSICAL EXAM  Vital Signs Last 24 Hrs  T(C): 36.8 (23 Nov 2018 04:03), Max: 36.8 (22 Nov 2018 21:28)  T(F): 98.3 (23 Nov 2018 04:03), Max: 98.3 (22 Nov 2018 21:28)  HR: 105 (23 Nov 2018 04:03) (101 - 110)  BP: 103/72 (23 Nov 2018 04:03) (102/67 - 117/82)  BP(mean): --  RR: 18 (23 Nov 2018 04:03) (18 - 18)  SpO2: 96% (23 Nov 2018 04:03) (95% - 96%)      Lymphatic: No lymphadenopathy , No longer has visible edema  Cardiovascular: Normal S1 S2,RRR, No murmur   Respiratory: Lungs clear to auscultation, normal effort 	  Gastrointestinal:  Soft, distended   Skin: No rashes, No ecchymoses, No cyanosis,     DATA:    TELEMETRY: , SR,     	    < from: TTE with Doppler (w/Cont) (11.07.18 @ 05:53) >  Conclusions:  1. Tethered mitral valve leaflets with normal opening.  Mitral annular calcification and thickened  mitral leaflet  tips Moderate mitral regurgitation.  2. Calcified trileaflet aortic valve with normal opening.  3. Moderately dilated left atrium.  LA volume index = 43  cc/m2.  4. Eccentric left ventricular hypertrophy (dilated left  ventricle with normal relative wall thickness).  5. Severe global left ventricular systolic dysfunction.  Endocardial visualization enhanced with intravenous  injection of Ultrasonic Enhancing Agent (Definity). No LV  thrombus.  Septal flattening consistent with right  ventricular overload.  6. Severe diastolic dysfunction with elevated filling  pressures  7. Severe right atrial enlargement.  8. Right ventricular enlargement with decreased right  ventricular systolic function.  A device wire is noted in  the right heart.  9. Dilated tricuspid annulus with malcoaptation of  tricuspid leaflets. Severe tricuspid regurgitation.  *** No previous Echo exam.  ------------------------------------------------------------------------  Confirmed on  11/7/2018 - 16:40:23 by Haley Koch M.D.    < end of copied text >      < from: Cardiac Cath Lab - Adult (05.25.18 @ 11:46) >  DIAGNOSTIC RECOMMENDATIONS: Right heart catheterization demonstrates  elevated PCWP and low cardiac output. Will transfer patient to the CCU for  inotrope assisted diuresis.  Prepared and signed by  Kunal Muller M.D.  Signed 05/29/2018 16:07:27    < end of copied text >      ASSESSMENT/PLAN: 62 y/o F with PMHx of NICM s/p AICD, HTN, moderate-severe MR, reportedly recent LHC at [place] was negative, thyroid cancer s/p thyroidectomy, DM,  admitted with decompensated acute on chronic systolic heart failure.    -continue with inotrope assisted diuresis per heart failure. Will be d/c home with IV milirone via PICC  -continue with PO bumex - volume status improved   -no ischemic evaluation or interventional workup planned at this time  -dc planning per primary team  -follow up with Dr. Robbins  on December 4, 20188 at 10:45am.    Adele Miller PA-C

## 2018-11-23 NOTE — PROGRESS NOTE ADULT - PROBLEM SELECTOR PROBLEM 1
Acute on chronic systolic congestive heart failure
Elevated liver enzymes
Cardiogenic shock
Hypoparathyroidism
Hypoparathyroidism
Elevated liver enzymes
Cardiogenic shock
Cardiogenic shock

## 2018-11-23 NOTE — PROGRESS NOTE ADULT - PROBLEM SELECTOR PLAN 2
- appreciate GYN consultation - follow their ongoing recs
improved, Hgb stable, plan for Endometrial Biopsy when stable and INR improved
- appreciate GYN consultation - eventual endometrial bx inpatient
- appreciate GYN consultation - eventual endometrial bx inpatient
- appreciate GYN consultation - follow their ongoing recs
- appreciate GYN consultation - follow their ongoing recs  - await bx
Renal consulted. Has been Taking NSAID prn also.
-c/w levothyroxine check TFTs and TG
TSH 0.39, Ft4 1.1.  c/w levothyroxine  -Will f/u with outpatient endo
improved, Hgb stable, plan for Endometrial Biopsy (non-urgent)
improved, Hgb stable, plan for Endometrial Biopsy when stable and INR improved
improved, Hgb stable, plan for Endometrial Biopsy (non-urgent)

## 2018-11-23 NOTE — PROGRESS NOTE ADULT - PROBLEM SELECTOR PROBLEM 3
Hyponatremia
Hyponatremia
Electrolyte imbalance
Hyperkalemia
Hyponatremia
Type 2 diabetes mellitus without complication, without long-term current use of insulin
Type 2 diabetes mellitus without complication, without long-term current use of insulin
Hyponatremia

## 2018-11-23 NOTE — PROGRESS NOTE ADULT - SUBJECTIVE AND OBJECTIVE BOX
EP ATTENDING    tele: NSR, biv pacing    no palpitations, no syncope, no angina    acetaminophen   Tablet .. 975 milliGRAM(s) Oral every 4 hours PRN  aspirin  chewable 81 milliGRAM(s) Oral daily  benzonatate 100 milliGRAM(s) Oral three times a day PRN  Biotene Dry Mouth Oral Rinse 5 milliLiter(s) Swish and Spit two times a day  buMETAnide 3 milliGRAM(s) Oral two times a day  calcitriol   Capsule 0.5 MICROGram(s) Oral daily  calcium acetate 2001 milliGRAM(s) Oral three times a day with meals  calcium carbonate 1250 mG  + Vitamin D (OsCal 500 + D) 2 Tablet(s) Oral two times a day  gabapentin 200 milliGRAM(s) Oral three times a day  guaiFENesin   Syrup  (Sugar-Free) 100 milliGRAM(s) Oral every 6 hours PRN  influenza   Vaccine 0.5 milliLiter(s) IntraMuscular once  insulin lispro (HumaLOG) corrective regimen sliding scale   SubCutaneous three times a day before meals  insulin lispro (HumaLOG) corrective regimen sliding scale   SubCutaneous at bedtime  levothyroxine 175 MICROGram(s) Oral daily  magnesium oxide 400 milliGRAM(s) Oral two times a day with meals  milrinone Infusion 0.375 MICROgram(s)/kG/Min IV Continuous <Continuous>  pantoprazole    Tablet 40 milliGRAM(s) Oral before breakfast  potassium chloride    Tablet ER 20 milliEquivalent(s) Oral daily  rifaximin 550 milliGRAM(s) Oral two times a day  spironolactone 50 milliGRAM(s) Oral two times a day                            11.1   6.0   )-----------( 334      ( 22 Nov 2018 07:02 )             34.7       11-23    133<L>  |  87<L>  |  21  ----------------------------<  103<H>  3.5   |  29  |  1.01    Ca    8.9      23 Nov 2018 07:15  Mg     1.4     11-22    TPro  7.4  /  Alb  3.3  /  TBili  4.6<H>  /  DBili  x   /  AST  37  /  ALT  24  /  AlkPhos  321<H>  11-23        T(C): 36.8 (11-23-18 @ 04:03), Max: 36.8 (11-22-18 @ 21:28)  HR: 105 (11-23-18 @ 04:03) (101 - 110)  BP: 103/72 (11-23-18 @ 04:03) (102/67 - 117/82)  RR: 18 (11-23-18 @ 04:03) (18 - 18)  SpO2: 96% (11-23-18 @ 04:03) (95% - 96%)  Wt(kg): --    JVP 4  tachy, no murmurs  lungs with rales  soft nt/nd  + 2 edema    device interrogation normal  EKG: NSR, Biv pacing      A/P) She is a pleasant 62 y/o female PMH severe NICM (LVEF 10-15%) who had a [employer] Biv-ICD implanted at [place]. A recent LHC at [place] was also unremarkable. She now returns for with a severe decompensation of her NICM. Her device interrogation demonstrates excellent RA, RV and LV lead function. She denies palpitations nor high voltage therapy.     -will defer medical management of CHF to cardiology  -f/u CHF consult for advanced therapies (VAD vs transplant)  -no need for AAD  -Her biv-icd was recently optimized by me  -f/u with [facility] after discharge  -no further inpatient EP workup needed  -d/c planning as per primary team

## 2018-11-24 PROCEDURE — 82947 ASSAY GLUCOSE BLOOD QUANT: CPT

## 2018-11-24 PROCEDURE — 36569 INSJ PICC 5 YR+ W/O IMAGING: CPT

## 2018-11-24 PROCEDURE — 93005 ELECTROCARDIOGRAM TRACING: CPT

## 2018-11-24 PROCEDURE — 87581 M.PNEUMON DNA AMP PROBE: CPT

## 2018-11-24 PROCEDURE — C1751: CPT

## 2018-11-24 PROCEDURE — 80061 LIPID PANEL: CPT

## 2018-11-24 PROCEDURE — 83036 HEMOGLOBIN GLYCOSYLATED A1C: CPT

## 2018-11-24 PROCEDURE — 82150 ASSAY OF AMYLASE: CPT

## 2018-11-24 PROCEDURE — 99285 EMERGENCY DEPT VISIT HI MDM: CPT

## 2018-11-24 PROCEDURE — 83880 ASSAY OF NATRIURETIC PEPTIDE: CPT

## 2018-11-24 PROCEDURE — 87486 CHLMYD PNEUM DNA AMP PROBE: CPT

## 2018-11-24 PROCEDURE — 85730 THROMBOPLASTIN TIME PARTIAL: CPT

## 2018-11-24 PROCEDURE — 80076 HEPATIC FUNCTION PANEL: CPT

## 2018-11-24 PROCEDURE — 86850 RBC ANTIBODY SCREEN: CPT

## 2018-11-24 PROCEDURE — 82310 ASSAY OF CALCIUM: CPT

## 2018-11-24 PROCEDURE — 87633 RESP VIRUS 12-25 TARGETS: CPT

## 2018-11-24 PROCEDURE — 82803 BLOOD GASES ANY COMBINATION: CPT

## 2018-11-24 PROCEDURE — 71045 X-RAY EXAM CHEST 1 VIEW: CPT

## 2018-11-24 PROCEDURE — 97116 GAIT TRAINING THERAPY: CPT

## 2018-11-24 PROCEDURE — 82746 ASSAY OF FOLIC ACID SERUM: CPT

## 2018-11-24 PROCEDURE — 84100 ASSAY OF PHOSPHORUS: CPT

## 2018-11-24 PROCEDURE — 82330 ASSAY OF CALCIUM: CPT

## 2018-11-24 PROCEDURE — 86901 BLOOD TYPING SEROLOGIC RH(D): CPT

## 2018-11-24 PROCEDURE — 82435 ASSAY OF BLOOD CHLORIDE: CPT

## 2018-11-24 PROCEDURE — 83970 ASSAY OF PARATHORMONE: CPT

## 2018-11-24 PROCEDURE — 85014 HEMATOCRIT: CPT

## 2018-11-24 PROCEDURE — 74018 RADEX ABDOMEN 1 VIEW: CPT

## 2018-11-24 PROCEDURE — 83605 ASSAY OF LACTIC ACID: CPT

## 2018-11-24 PROCEDURE — 84439 ASSAY OF FREE THYROXINE: CPT

## 2018-11-24 PROCEDURE — 74176 CT ABD & PELVIS W/O CONTRAST: CPT

## 2018-11-24 PROCEDURE — 76937 US GUIDE VASCULAR ACCESS: CPT

## 2018-11-24 PROCEDURE — C8929: CPT

## 2018-11-24 PROCEDURE — 76856 US EXAM PELVIC COMPLETE: CPT

## 2018-11-24 PROCEDURE — 82962 GLUCOSE BLOOD TEST: CPT

## 2018-11-24 PROCEDURE — 80048 BASIC METABOLIC PNL TOTAL CA: CPT

## 2018-11-24 PROCEDURE — 97530 THERAPEUTIC ACTIVITIES: CPT

## 2018-11-24 PROCEDURE — 83690 ASSAY OF LIPASE: CPT

## 2018-11-24 PROCEDURE — 80053 COMPREHEN METABOLIC PANEL: CPT

## 2018-11-24 PROCEDURE — 83519 RIA NONANTIBODY: CPT

## 2018-11-24 PROCEDURE — 84295 ASSAY OF SERUM SODIUM: CPT

## 2018-11-24 PROCEDURE — 83735 ASSAY OF MAGNESIUM: CPT

## 2018-11-24 PROCEDURE — 82140 ASSAY OF AMMONIA: CPT

## 2018-11-24 PROCEDURE — 84443 ASSAY THYROID STIM HORMONE: CPT

## 2018-11-24 PROCEDURE — 97161 PT EVAL LOW COMPLEX 20 MIN: CPT

## 2018-11-24 PROCEDURE — 76830 TRANSVAGINAL US NON-OB: CPT

## 2018-11-24 PROCEDURE — 87798 DETECT AGENT NOS DNA AMP: CPT

## 2018-11-24 PROCEDURE — 84132 ASSAY OF SERUM POTASSIUM: CPT

## 2018-11-24 PROCEDURE — 85610 PROTHROMBIN TIME: CPT

## 2018-11-24 PROCEDURE — 86900 BLOOD TYPING SEROLOGIC ABO: CPT

## 2018-11-24 PROCEDURE — 81001 URINALYSIS AUTO W/SCOPE: CPT

## 2018-11-24 PROCEDURE — 82607 VITAMIN B-12: CPT

## 2018-11-24 PROCEDURE — 85027 COMPLETE CBC AUTOMATED: CPT

## 2018-11-26 ENCOUNTER — INBOUND DOCUMENT (OUTPATIENT)
Age: 63
End: 2018-11-26

## 2018-11-28 ENCOUNTER — APPOINTMENT (OUTPATIENT)
Dept: CARDIOLOGY | Facility: CLINIC | Age: 63
End: 2018-11-28

## 2018-11-30 ENCOUNTER — EMERGENCY (EMERGENCY)
Facility: HOSPITAL | Age: 63
LOS: 1 days | Discharge: HOME | End: 2018-11-30
Attending: EMERGENCY MEDICINE
Payer: COMMERCIAL

## 2018-11-30 VITALS
SYSTOLIC BLOOD PRESSURE: 111 MMHG | RESPIRATION RATE: 18 BRPM | TEMPERATURE: 98 F | DIASTOLIC BLOOD PRESSURE: 67 MMHG | HEART RATE: 90 BPM | OXYGEN SATURATION: 97 %

## 2018-11-30 DIAGNOSIS — Z95.810 PRESENCE OF AUTOMATIC (IMPLANTABLE) CARDIAC DEFIBRILLATOR: Chronic | ICD-10-CM

## 2018-11-30 PROCEDURE — 93010 ELECTROCARDIOGRAM REPORT: CPT

## 2018-11-30 PROCEDURE — 93005 ELECTROCARDIOGRAM TRACING: CPT

## 2018-11-30 PROCEDURE — 99283 EMERGENCY DEPT VISIT LOW MDM: CPT

## 2018-11-30 PROCEDURE — 99284 EMERGENCY DEPT VISIT MOD MDM: CPT | Mod: 25

## 2018-11-30 NOTE — ED ADULT NURSE NOTE - NSIMPLEMENTINTERV_GEN_ALL_ED
Implemented All Universal Safety Interventions:  Rena Lara to call system. Call bell, personal items and telephone within reach. Instruct patient to call for assistance. Room bathroom lighting operational. Non-slip footwear when patient is off stretcher. Physically safe environment: no spills, clutter or unnecessary equipment. Stretcher in lowest position, wheels locked, appropriate side rails in place.

## 2018-11-30 NOTE — ED ADULT NURSE NOTE - OBJECTIVE STATEMENT
63 yr old female with  from home with c/o wt gain, urinary retention this morning, (adm Nov 6 to 23rd, had RUE PICC placed for continuous infusion of milrinone, hx CHF), has been voiding continuously during infusion, admits to changing 3 heavy wet diapers yesterday, this morning noted diaper was dry, noticed PICC pump was not working, IV nurse came to the house, flushed the PICC x 2, now pump working since 5 pm today, +admits to feeling better, +voided 3-4 times since 5 pm, +admits to mild lower abd cramping, had low appetite, belching while pump not working earlier today, was d/shahana home from [facility] at 166 lbs, increased to 178 lbs this morning, 180 lb when IV RN came to the house, at present clear lungs, skin wdi, +ambulatory, vitals stable, +afebrile, PICC pump working now, +infusing, RUE PICC site appears clean dry intact, dressing intact, IV nurse recommended pt to come to ED for EKG and well-check, denies cp/sob/dizzy

## 2018-12-01 VITALS
DIASTOLIC BLOOD PRESSURE: 87 MMHG | TEMPERATURE: 98 F | OXYGEN SATURATION: 98 % | HEART RATE: 88 BPM | SYSTOLIC BLOOD PRESSURE: 108 MMHG | RESPIRATION RATE: 17 BRPM

## 2018-12-01 NOTE — ED PROVIDER NOTE - NSFOLLOWUPINSTRUCTIONS_ED_ALL_ED_FT
Follow-up with your Primary Care Doctor in 1-2 days. Return to ED for worsening, progressive or any other concerning symptoms such as fevers, severe pain, trouble breathing, weakness or lightheadedness.

## 2018-12-01 NOTE — ED PROVIDER NOTE - PHYSICAL EXAMINATION
Gen: No acute distress, alert, cooperative  Head: Normocephalic, Atraumatic  HEENT: PERRL, oral mucosa moist, normal conjunctiva  Lung: CTAB, no respiratory distress, no crackles or wheezes  CV: rrr, no murmur  Abd: soft, NTND, no rebound or guarding  MSK: Right arm PICC line in place, no erythema or swelling.   Neuro: No focal neurologic deficits  Skin: Warm and dry, no evidence of rash   Psych: normal affect, follows commands

## 2018-12-01 NOTE — ED PROVIDER NOTE - OBJECTIVE STATEMENT
64yo F hx of dm, mod-severe MR, NICM s/p AICD, patient now on continuous milrinone infusion presenting after pump stopped functioning. Was recently discharged after lengthy stay on Nov 23rd. Was doing well, weight constant, no issues, no fevers. Yesterday felt more weak, fatigued, abdominal discomfort. Last night didn't urinate all night, in the AM today noticed that her pump wasn't working. Also noted she gained 12 lbs off baseline. Called IV nurse who came and flushed, pump started working. Since then has felt much better, urinated 3 times. Currently feeling almost back to normal.   Also takes spironolactone and bumex.

## 2018-12-01 NOTE — ED PROVIDER NOTE - ATTENDING CONTRIBUTION TO CARE
63 yof pmhx NICM aicd, sv CHF, htn, mod to sv MR, recent admission just discharged nov 23 after she was started on milrinone gtt via picc line as well as po bumex for decompensated heart failure, presents w c/o abd swelling and mild weakness. states yest afternoon noted that her milrinone pump was beeping and giving her a pump failure message - states the beeping would stop intermittently, so didn't immediately do anything about it. states during the night while the pump was alarming, she didn't urinate at all. in AM noted her weight had gone up by 10 lbs, so called the infusion company. states infusion RN was able to flush the picc line; since then milrinone pump has been functioning w/o any issues. states overnight had some abd swelling and mild discomfort that is now resolved that the pump is working again and she has urinated mult times. denies cp or sob at any point during the night or today. family at bedside state pt is at baseline status. no recent f/c, no pain or swelling to r arm picc line site.   is asymptomatic at time of ED exam. states after infusion RN flushed line, she was recommended to come to ED to "get checked out and get an EKG as I am a heart patient."     ROS:   constitutional - no fever, no chills  eyes - no visual changes, no redness  eent - no sore throat, no nasal congestion  cvs - no chest pain, no leg swelling  resp - no shortness of breath, no cough  gi - no abdominal pain, no vomiting, no diarrhea  gu - no dysuria, no hematuria  msk - no acute back pain, no joint swelling  skin - no rashes, no jaundice  neuro - no headache, no focal weakness  psych - no acute mental health issue     Physical Exam:   constitutional - well appearing, awake and alert, oriented x3  head - no external evidence of trauma  cvs - rrr, no murmurs,   resp - breath sounds clear and equal bilat  gi - abdomen soft and nontender, no rigidity, guarding or rebound, bowel sounds present. chronic appearing mild abd distention   msk - moving all extremities spontaneously  neuro - alert and oriented x3, no focal deficits, CNs 2-12 grossly intact  skin- no jaundice, warm and dry  psych - mood and affect wnl, no apparent risk to self or others     pt w sxs likely related to transient mild vol overload in known chf pt w EF 10 on home milrinone/ bumex after pump malfunction - pump now working correctly ; pt asymptomatic at this time. pt states she knows what to look for if pump malfunction recurs, has phone number to contact infusion co if needed, counseled to return to ED if pump malfunctions again. has outpt f/u w cards arranged. additional verbal instructions regarding diagnosis, return precautions and follow up plan given to pt and/or family. ANUJA Gaston MD

## 2018-12-01 NOTE — ED PROVIDER NOTE - MEDICAL DECISION MAKING DETAILS
62yo F hx of dm, mod-severe MR, NICM s/p AICD, patient now on continuous milrinone infusion presenting after pump stopped functioning. Now working, feeling better, urinating. Appears well, vitals wnl. DC.

## 2018-12-05 ENCOUNTER — APPOINTMENT (OUTPATIENT)
Dept: CARDIOLOGY | Facility: CLINIC | Age: 63
End: 2018-12-05
Payer: COMMERCIAL

## 2018-12-05 VITALS
WEIGHT: 184 LBS | DIASTOLIC BLOOD PRESSURE: 71 MMHG | HEIGHT: 67 IN | BODY MASS INDEX: 28.88 KG/M2 | SYSTOLIC BLOOD PRESSURE: 102 MMHG | HEART RATE: 104 BPM | OXYGEN SATURATION: 99 %

## 2018-12-05 VITALS — HEART RATE: 100 BPM

## 2018-12-05 DIAGNOSIS — E83.42 HYPOMAGNESEMIA: ICD-10-CM

## 2018-12-05 DIAGNOSIS — K80.20 CALCULUS OF GALLBLADDER W/OUT CHOLECYSTITIS W/OUT OBSTRUCTION: ICD-10-CM

## 2018-12-05 DIAGNOSIS — Z86.79 PERSONAL HISTORY OF OTHER DISEASES OF THE CIRCULATORY SYSTEM: ICD-10-CM

## 2018-12-05 DIAGNOSIS — E11.9 TYPE 2 DIABETES MELLITUS W/OUT COMPLICATIONS: ICD-10-CM

## 2018-12-05 DIAGNOSIS — R05 COUGH: ICD-10-CM

## 2018-12-05 PROCEDURE — 99214 OFFICE O/P EST MOD 30 MIN: CPT

## 2018-12-05 RX ORDER — METOPROLOL SUCCINATE 25 MG/1
25 TABLET, EXTENDED RELEASE ORAL
Qty: 90 | Refills: 3 | Status: DISCONTINUED | COMMUNITY
Start: 2018-06-04 | End: 2018-12-05

## 2018-12-05 RX ORDER — TORSEMIDE 20 MG/1
20 TABLET ORAL
Qty: 180 | Refills: 3 | Status: DISCONTINUED | COMMUNITY
Start: 2018-06-14 | End: 2018-12-05

## 2018-12-05 RX ORDER — UBIDECARENONE/VIT E ACET 100MG-5
50 MCG CAPSULE ORAL
Refills: 0 | Status: DISCONTINUED | COMMUNITY
Start: 2018-06-14 | End: 2018-12-05

## 2018-12-05 RX ORDER — MAGNESIUM OXIDE 241.3 MG/1000MG
400 TABLET ORAL DAILY
Qty: 30 | Refills: 2 | Status: DISCONTINUED | COMMUNITY
Start: 2018-06-19 | End: 2018-12-05

## 2018-12-05 RX ORDER — METOLAZONE 2.5 MG/1
2.5 TABLET ORAL
Qty: 30 | Refills: 1 | Status: DISCONTINUED | COMMUNITY
Start: 2018-06-14 | End: 2018-12-05

## 2018-12-05 RX ORDER — VALSARTAN 80 MG/1
80 TABLET, COATED ORAL
Qty: 90 | Refills: 3 | Status: DISCONTINUED | COMMUNITY
Start: 2018-06-04 | End: 2018-12-05

## 2018-12-05 RX ORDER — HYDRALAZINE HYDROCHLORIDE 50 MG/1
50 TABLET ORAL
Qty: 180 | Refills: 1 | Status: DISCONTINUED | COMMUNITY
Start: 2018-06-04 | End: 2018-12-05

## 2018-12-06 ENCOUNTER — RESULT REVIEW (OUTPATIENT)
Age: 63
End: 2018-12-06

## 2018-12-06 LAB
ALBUMIN SERPL ELPH-MCNC: 3.9 G/DL
ALP BLD-CCNC: 402 U/L
ALT SERPL-CCNC: 47 U/L
ANION GAP SERPL CALC-SCNC: 18 MMOL/L
AST SERPL-CCNC: 77 U/L
BILIRUB SERPL-MCNC: 5.5 MG/DL
BUN SERPL-MCNC: 33 MG/DL
CALCIUM SERPL-MCNC: 7.6 MG/DL
CHLORIDE SERPL-SCNC: 86 MMOL/L
CO2 SERPL-SCNC: 22 MMOL/L
CREAT SERPL-MCNC: 1.27 MG/DL
GLUCOSE SERPL-MCNC: 148 MG/DL
MAGNESIUM SERPL-MCNC: 1.9 MG/DL
NT-PROBNP SERPL-MCNC: 2751 PG/ML
POTASSIUM SERPL-SCNC: 4.8 MMOL/L
PROT SERPL-MCNC: 8.8 G/DL
SODIUM SERPL-SCNC: 126 MMOL/L

## 2018-12-06 NOTE — HISTORY OF PRESENT ILLNESS
[FreeTextEntry1] : Ms. Carson is a 62 y/o AA woman with h/o NICM (LVEF now 9%, LVIDd 6.1 cm), initially identified in 2013, s/p CRT-D (6/2017), Mod-Severe MR, Severe TR, HTN, HLD, DM II, and Thyroid Cancer s/p Resection, Surgical Hypoparathyroidism with Chronic Hypocalcemia, who was recently admitted from 11/6/18-11/23/18 for ADHF and presents today for follow up post discharge. \par \par She has had 5 admissions to [facility] and 1 recently to [facility] in 2018 for ADHF, intermittently requiring IV inotropic support. She has not consistently followed-up with the advanced HF team resulting in suboptimal GDMT for her HF.\par \par She presented again to the ED on 11/6/18 with c/o vaginal bleeding in the setting of an elevated INR, which was likely the result of congestive hepatopathy. Her course was complicated by cardiogenic shock requiring IV inotropic support with dobutamine, which was then later switched to milrinone. She was diuresed on IV bumex and treated empirically with antibiotics for a UTI. A PICC line was placed and she was discharged home on milrinone 0.375 mcg/kg/min. Her discharge weight was 166.8 lbs.\par \par She presents today for follow-up. Since being discharged home on 11/23 she states she has been feeling well. She is able to walk about 10 blocks and can exercise on her bike at home for about 20 minutes. She uses a cane when walking outside, but in the house she does not need to use it. She can climb a flight of stairs without dyspnea, but reports that the bag for her milrinone infusion is heavy and limits her activity. She sleeps with 1 pillow at night and denies any orthopnea or PND. She has a cough, which she notes to be present since April. It is occasionally productive with yellow sputum, but denies any sinus tenderness, fever, or chills. She reports weight gain since being home from the hospital. Today she is up to 184 lbs and at home she reports being 180 lbs, at least 14lbs up from discharge. She states she has been limiting sodium in her diet and limiting fluid to < 2L per day. Her appetite has been normal. She has noticed that her urine output this morning was not as robust as usually, but typically responds well to current dose of diuretics. States her bowel habits have been normal for her. She denies any CP, palpitations, lightheadedness, dizziness, syncope, abdominal discomfort, diarrhea, erythema or discharge from PICC line site. Her ICD has not fired since being home. She endorses compliance with her medications as prescribed.

## 2018-12-06 NOTE — REASON FOR VISIT
[Follow-Up - From Hospitalization] : follow-up of a recent hospitalization for [Heart Failure] : congestive heart failure [Discharge Date: ___] : Discharge Date: [unfilled] [Admitted for Heart Failure] : patient was admitted for heart failure [FreeTextEntry1] : Patient Instructions:\par \par 1. Please INCREASE the BUMEX to 6 mg TWICE a day.\par \par 2. We will check you blood work today and call you with the results.\par \par 3. Continue the MILRINONE infusion and all your other medications as prescribed.\par \par 4. Continue to monitor your weight and BP at home. Call us if you notice a weight gain of 2 pounds in 2 days or 5 pounds in a week.\par \par 5. Continue to limit sodium and limit total fluid intake to less than 64 ounces a day.\par \par 6. Please follow-up with the HF NP on Monday.

## 2018-12-06 NOTE — ADDENDUM
[FreeTextEntry1] : Saw and examined patient with Beatriz Peterson NP. Agree with history, PE, assessment and plan as noted above.\par \par ADRIEN BraggP

## 2018-12-07 NOTE — ED ADULT TRIAGE NOTE - CADM TRG TX PRIOR TO ARRIVAL
Reason For Visit  Susannah Longoria is an established patient here today for a chief complaint of . lower back pain, mild constipation,. :  services not used. A chaperone is not applicable. He is unaccompanied. History of Present Illness  Patient is a 73-year-old male who presents presents with low back pain and constipation     Patient reports that both things started on Friday. He reports that he occasionally gets flareups in his lower back bilaterally muscles. He is not doing anything at the time. He reports taking no medications to help. Is been using heat pack which helps but only works on its own. He has taken Flexeril in the past for his back pain. He has not been as active lately. He works a desk job. He is not doing any heavy lifting. For patient's constipation and is on and off. He reports that he felt constipated on Friday. He took fiber supplements and he was able to have a bowel movement today and felt slightly better after that. He has taken MiraLAX in the past but does not have any. No blood in the stool. No pain on urination. Allergies  No Known Drug Allergies    Current Meds   1. Cyclobenzaprine HCl - 10 MG Oral Tablet; TAKE 1 TABLET AT BEDTIME AS NEEDED FOR   MUSCLE SPASMS; Therapy: [id number] to ([employer] [employer])  Requested for: 04Smc3692; Last   Rx:[id number] Ordered   2. Naproxen 500 MG Oral Tablet; TAKE 1 TABLET EVERY 12 HOURS for 10-14 days then as   needed for pain. (take with food); Therapy: 88Hby1929 to (Evaluate:83Gxh0676)  Requested for: 43Uye8475;  Last   Rx:[id number] Ordered    Active Problems  Ankle fracture, left (Z97.104X)  Back pain, chronic (M54.9,G89.29)  Carpal tunnel syndrome (G56.00)  Change in bowel movement (R19.8)  Cocaine use (F14.90)  Constipation (K59.00)  Decreased libido (R68.82)   Â· check labs  Difficulty in urination (R39.198)  Encounter for screening for diabetes mellitus (Z13.1)  Fatigue (R53.83)  Lesion of skin of scrotum (N50.9)  Lipid screening (Z13.220)  Macrocytic anemia (D53.9)  Macrocytosis (D75.89)  Neck muscle strain (S16.1XXA)  Need for DTP vaccine (Z23)  History of Need for immunization against influenza (Z23)  Obstructive sleep apnea (G47.33)  Occipital neuralgia (M54.81)  Pain of right upper extremity (M79.601)  Screening for deficiency anemia (Z13.0)  Screening for STD (sexually transmitted disease) (Z11.3)  Screening for thyroid disorder (Z13.29)  Snoring (R06.83)  Sorethroat (J02.9)  Total bilirubin, elevated (R17)  Upper back strain (S29.012A)    Past Medical History  History of Alcohol abuse, continuous drinking behavior (F10.10)  History of Backache (M54.9)  History of Cervicalgia (M54.2)  History of Elbow pain, left (M25.522)  History of Epicondylitis, lateral (tennis elbow) (M77.10)  History of allergic rhinitis (Z87.09)  History of upper respiratory infection (Z87.09)  History of Itching (L29.9)  History of Limb pain (M79.609)   Â· ? etiology  History of Localized superficial swelling, mass, or lump (R22.9)  History of Need for immunization against influenza (Z23)  History of Numbness (R20.0)  History of Pain of right upper extremity (M79.601)  History of Rash (R21)  History of Strain of thoracic region (S29.019A)  History of Urinary symptom or sign (R39.9)    Surgical History  History of Surgery   Â· partial coloctomy for diverticulitis    Family History  Paternal Grandfather   Family history of colon cancer (Z80.0)   Â· at age 48  Family History   Denied: Family history of Prostate Cancer    Social History  Denied: History of Alcohol  Current every day smoker (F17.200)  Former smoker (Z87.891)  Personal history of nicotine dependence (Z87.891)    Vitals  Signs   Recorded: 05OAO0649 01:16PM   Weight: 203 lb 7 oz  BMI Calculated: 31.86  BSA Calculated: 5.41  Systolic: 879, RUE, Sitting  Diastolic: 72, RUE, Sitting  Temperature: 98.8 F, Tympanic  Heart Rate: 61, R Radial  Respiration: 18  O2 Saturation: 98    Physical Exam  Constitutional: alert, in no acute distress and current vital signs reviewed. Head and Face: atraumatic, no deformities, normocephalic, normal facies. Eyes: no discharge and normal conjunctiva. ENT: normal appearing outer ear. normal lips. Pulmonary: no respiratory distress, normal respiratory rate and effort and no accessory muscle use. breath sounds clear to auscultation bilaterally. Cardiovascular: normal rate, no murmurs were heard, regular rhythm, normal S1 and normal S2. edema was not present in the lower extremities. Abdomen: soft, nontender and nondistended. Musculoskeletal: normal gait. Lower lumbar paraspinal muscle tightness and tenderness to palpation no spinal tenderness, negative straight leg raise bilaterally   Psychiatric: alert and awake, interactive and mood/affect were appropriate. Skin, Hair, Nails: normal skin color and pigmentation and no rash. Immunizations  Influenza --- Cindy Charlesleah: [id number] Peaches: 07-Dec-2016; Kim Zazueta: 20-Sep-2017   Tdap --- Series1: 15-Dec-2016     Assessment  Low back pain (M54.5)  Constipation (K59.00)  Back pain, chronic (M54.9,G89.29)  Macrocytic anemia (D53.9)  Lesion of skin of scrotum (N50.9)    Plan  Polyethylene Glycol 3350 Oral Packet; MIX 1 PACKET IN 8 OUNCES OF LIQUID  AND DRINK TWICE DAILY  Cyclobenzaprine HCl - 10 MG Oral Tablet; TAKE 1 TABLET Bedtime PRN back  spasm  Naproxen 500 MG Oral Tablet; TAKE 1 TABLET EVERY 12 HOURS for 10-14 days  then as needed for pain. (take with food)  CBC WITH AUTOMATED DIFFERENTIAL; Status:Active; Requested FDP:[id number];   COMP METABOLIC PNL; Status:Active; Requested for:19Nov2018;   LIPID PNL W/ RFX; Status:Active; Requested for:19Nov2018;   VITAMIN B12/FOLATE; Status:Active; Requested for:19Nov2018;   Plans:     Plan:   Back Pain: Likely lower back strain/muscle spasm  Naproxen 500 mg twice daily for 1-2 weeks then as needed after that.  Please take with food  Cyclobenzaprine 10 mg to take at bedtime given to patient  Handout given to patient for back strengthening and stretching exercises  Can continue to use heat pack followed up by ice  Return To clinic physical therapy if not improving in 2-3 weeks    Constipation: mild   Instructed patient to drink lots of water if taking fiber supplements  Miralax prescribed for patient    Hx of B12 def/ Macrocytic anemia: previously got shots   Would like to do routine labs to see if deficient again     Follow up in 2-3 weeks if not improving. Follow-up in the office as needed. Patient expresses understanding of the plan. Signatures   Electronically signed by :  Gege Malhotra, ; Nov 19 2018  1:18PM CST (Co-author)    Electronically signed by : Lauren Pierson M.D.; Nov 19 2018  3:52PM CST none

## 2018-12-13 ENCOUNTER — APPOINTMENT (OUTPATIENT)
Dept: CARDIOLOGY | Facility: CLINIC | Age: 63
End: 2018-12-13
Payer: COMMERCIAL

## 2018-12-13 VITALS
OXYGEN SATURATION: 99 % | SYSTOLIC BLOOD PRESSURE: 107 MMHG | BODY MASS INDEX: 29.35 KG/M2 | HEART RATE: 101 BPM | HEIGHT: 67 IN | WEIGHT: 187 LBS | DIASTOLIC BLOOD PRESSURE: 79 MMHG

## 2018-12-13 PROCEDURE — 99214 OFFICE O/P EST MOD 30 MIN: CPT

## 2018-12-13 RX ORDER — METOLAZONE 2.5 MG/1
2.5 TABLET ORAL EVERY OTHER DAY
Qty: 15 | Refills: 0 | Status: DISCONTINUED | COMMUNITY
Start: 2018-12-13 | End: 2018-12-13

## 2018-12-13 NOTE — HISTORY OF PRESENT ILLNESS
[FreeTextEntry1] : Ms. Carson is a 62 y/o AA woman with NICM (LVEF now 9%, LVIDd 6.1 cm), initially identified in 2013, s/p CRT-D (6/2017), Mod-Severe MR, Severe TR, HTN, HLD, DM II, and Thyroid Cancer s/p Resection, Surgical Hypoparathyroidism with Chronic Hypocalcemia.\par \par She has had 5 admissions to [facility] this past year and 1 recently to [facility] for ADHF, intermittently requiring IV inotropic support. She has not consistently followed-up with the advanced HF team resulting in suboptimal GDMT for her HF. She was then readmitted from 11/6/18-11/23/18 for ADHF. She presented to the ED with c/o vaginal bleeding in the setting of an elevated INR, which was likely the result of congestive hepatopathy. Her course was complicated by cardiogenic shock requiring IV inotropic support with dobutamine, which was then later switched to milrinone. She was diuresed on IV bumex and treated empirically with antibiotics for a UTI. A PICC line was placed and she was discharged home on milrinone at 0.375 mcg/kg/min. Her discharge weight was 166.8 lbs.\par \par She was seen last week on 12/5 where her weight was up 17 lbs since discharge, so here Bumex was increased from 3 mg BID to 6 mg BID. She returns today for follow-up and her weight is up 3 more lbs since last visit. She does not do much walking outside but has been able to do all her shopping and laundry without difficulty. She also reports exercising on her bike at home for about 20 minutes. She can climb a flight of stairs without dyspnea. She sleeps with 1 pillow at night and denies any orthopnea or PND. She still reports a dry cough throughout the day. It is occasionally productive with yellow sputum, but denies any sinus tenderness, fever, or chills. She states she has been limiting sodium in her diet and limiting fluid to < 2L per day. Her appetite has been normal. She has noticed that her urine output has increased on the higher dose of Bumex. She still has BLE edema, but states it is improving. She reports her bowel habits have been normal for her. She denies any CP, palpitations, lightheadedness, dizziness, syncope, abdominal discomfort, diarrhea, erythema or discharge from PICC line site. Her ICD has not fired since being home. She endorses compliance with her medications as prescribed.

## 2018-12-13 NOTE — REASON FOR VISIT
[Follow-Up - Clinic] : a clinic follow-up of [Cardiomyopathy] : cardiomyopathy [Heart Failure] : congestive heart failure [FreeTextEntry1] : Patient Instructions:\par \par 1. Please continue BUMEX 6mg TWICE a day. This is 3 2mg tablets TWICE a day as you have been taking.\par \par 2. We will check you blood work today and call you with the results.\par \par 3. Continue the MILRINONE infusion and all your other medications as prescribed. The infusion nurse will come to adjust the dose and to give you IV diuretics to help with fluid removal.\par \par 4. Continue to monitor your weight and BP at home. Call us if you notice a weight gain of 2 pounds in 2 days or 5 pounds in a week.\par \par 5. Continue to limit sodium and limit total fluid intake to less than 64 ounces a day.\par \par 6. Please follow-up with the HF NP on Wednesday 12/19 at 9: 30 AM

## 2018-12-13 NOTE — DISCUSSION/SUMMARY
[Patient] : the patient [___ Week(s)] : [unfilled] week(s) [With ___] : with [unfilled] [FreeTextEntry1] : Ms. Carson is a 62 y/o AA woman with dilated NICM (LVEF 9%, LVIDd 6.1 cm) s/p CRT-D, Mod-Severe MR, Severe TR, HLD, DM II, and Thyroid Cancer s/p Resection, Surgical Hypoparathyroidism with Chronic Hypocalcemia. She is AHA/ACC Stage D, NYHA class III, normotensive, who remains notably volume overloaded despite augmentation of oral diuretics. She is on continuous milrinone infusion, yet does not seem adequately supported on current dose as she has quickly decompensated. I recommend the following:\par \par 1. Chronic systolic heart failure - We will increase the milrinone infusion to 0.5 mcg/kg/min and adjust dosing weight for current weight. We will call the infusion RN so she can give the patient a dose of lasix 80 mg IVP (or Bumex 2 mg IVP) today and tomorrow. She will continue Bumex 6 mg PO BID and continue to monitor her weight. We will repeat a CMP, Mg, proBNP today to reassess her electrolytes, kidney function, and LFTs. If all are stable, we will then add losartan 25 mg daily for additional afterload reduction. She will continue the current doses of all her other medications. Will defer on initiation of beta blocker at this time in the setting of volume overload. She will notify us if she continues to have a weight gain of 2 lbs in 2 days or 5 lbs in a week. Reinforced low sodium diet and limiting fluid to < 2 L/day.\par \par 2. Hypervolemic hyponatremia - We will recheck sodium level today. If her sodium is less than 125, will consider starting Tolvaptan.\par \par 3. She will return for follow-up with the HF NP on a weekly basis and then with Dr. Gallego in January.

## 2018-12-13 NOTE — PHYSICAL EXAM
[Well Groomed] : well groomed [General Appearance - Well Nourished] : well nourished [General Appearance - In No Acute Distress] : no acute distress [Eyelids - No Xanthelasma] : the eyelids demonstrated no xanthelasmas [Normal Oral Mucosa] : normal oral mucosa [No Oral Pallor] : no oral pallor [No Oral Cyanosis] : no oral cyanosis [] : no respiratory distress [Respiration, Rhythm And Depth] : normal respiratory rhythm and effort [Exaggerated Use Of Accessory Muscles For Inspiration] : no accessory muscle use [Auscultation Breath Sounds / Voice Sounds] : lungs were clear to auscultation bilaterally [Heart Rate And Rhythm] : heart rate and rhythm were normal [Heart Sounds] : normal S1 and S2 [Arterial Pulses Normal] : the arterial pulses were normal [2+] : left 2+ [Bowel Sounds] : normal bowel sounds [Abdomen Soft] : soft [Abdomen Tenderness] : non-tender [Nail Clubbing] : no clubbing of the fingernails [Cyanosis, Localized] : no localized cyanosis [Skin Color & Pigmentation] : normal skin color and pigmentation [Oriented To Time, Place, And Person] : oriented to person, place, and time [Affect] : the affect was normal [Mood] : the mood was normal [FreeTextEntry1] : Right upper arm PICC site CDI, no erythema or drainage.

## 2018-12-14 ENCOUNTER — OTHER (OUTPATIENT)
Age: 63
End: 2018-12-14

## 2018-12-14 LAB
ALBUMIN SERPL ELPH-MCNC: 3.8 G/DL
ALP BLD-CCNC: 340 U/L
ALT SERPL-CCNC: 34 U/L
ANION GAP SERPL CALC-SCNC: 12 MMOL/L
AST SERPL-CCNC: 47 U/L
BILIRUB SERPL-MCNC: 4.2 MG/DL
BUN SERPL-MCNC: 25 MG/DL
CALCIUM SERPL-MCNC: 7.4 MG/DL
CHLORIDE SERPL-SCNC: 87 MMOL/L
CO2 SERPL-SCNC: 26 MMOL/L
CREAT SERPL-MCNC: 1.09 MG/DL
GLUCOSE SERPL-MCNC: 131 MG/DL
LACTATE BLDA-MCNC: 3.1 MMOL/L
MAGNESIUM SERPL-MCNC: 1.6 MG/DL
NT-PROBNP SERPL-MCNC: 2125 PG/ML
POTASSIUM SERPL-SCNC: 4.3 MMOL/L
PROT SERPL-MCNC: 7.8 G/DL
SODIUM SERPL-SCNC: 126 MMOL/L

## 2018-12-19 ENCOUNTER — APPOINTMENT (OUTPATIENT)
Dept: CARDIOLOGY | Facility: CLINIC | Age: 63
End: 2018-12-19
Payer: COMMERCIAL

## 2018-12-19 VITALS
WEIGHT: 197 LBS | SYSTOLIC BLOOD PRESSURE: 72 MMHG | BODY MASS INDEX: 30.85 KG/M2 | HEART RATE: 90 BPM | OXYGEN SATURATION: 99 % | DIASTOLIC BLOOD PRESSURE: 52 MMHG

## 2018-12-19 VITALS
OXYGEN SATURATION: 100 % | WEIGHT: 197 LBS | BODY MASS INDEX: 30.92 KG/M2 | DIASTOLIC BLOOD PRESSURE: 51 MMHG | HEART RATE: 88 BPM | SYSTOLIC BLOOD PRESSURE: 79 MMHG | HEIGHT: 67 IN

## 2018-12-19 VITALS — SYSTOLIC BLOOD PRESSURE: 85 MMHG | HEART RATE: 90 BPM | DIASTOLIC BLOOD PRESSURE: 57 MMHG

## 2018-12-19 VITALS — DIASTOLIC BLOOD PRESSURE: 59 MMHG | HEART RATE: 88 BPM | SYSTOLIC BLOOD PRESSURE: 83 MMHG

## 2018-12-19 DIAGNOSIS — E83.39 OTHER DISORDERS OF PHOSPHORUS METABOLISM: ICD-10-CM

## 2018-12-19 PROCEDURE — 99214 OFFICE O/P EST MOD 30 MIN: CPT

## 2018-12-19 RX ORDER — RIFAXIMIN 550 MG/1
550 TABLET ORAL
Refills: 0 | Status: ACTIVE | COMMUNITY
Start: 2018-12-05

## 2018-12-19 RX ORDER — LOSARTAN POTASSIUM 25 MG/1
25 TABLET, FILM COATED ORAL DAILY
Qty: 30 | Refills: 3 | Status: DISCONTINUED | COMMUNITY
Start: 2018-12-14 | End: 2018-12-19

## 2018-12-19 RX ADMIN — FUROSEMIDE 0 MG/ML: 40 INJECTION INTRAMUSCULAR; INTRAVENOUS at 00:00

## 2018-12-19 NOTE — REASON FOR VISIT
[Follow-Up - Clinic] : a clinic follow-up of [Cardiomyopathy] : cardiomyopathy [Heart Failure] : congestive heart failure [FreeTextEntry1] : Patient Instructions:\par \par 1. Please STOP the LOSARTAN.\par \par 2. Please continue BUMEX 6mg TWICE a day. This is 3 2mg tablets TWICE a day as you have been taking.\par \par 2. We will check your blood work today and call you with the results.\par \par 3. Continue the MILRINONE infusion and all your other medications as prescribed. We will request that the home infusion nurse come adjust your milrinone to 0.5 mcg/kg/min.\par \par 4. Continue to monitor your weight and BP at home. Call us if you notice a weight gain of 2 pounds in 2 days or 5 pounds in a week.\par \par 5. Continue to limit sodium and limit total fluid intake to less than 64 ounces a day.\par \par 6. Please follow-up with the HF NP in 1 week.

## 2018-12-19 NOTE — HISTORY OF PRESENT ILLNESS
[FreeTextEntry1] : Ms. Carson is a 64 y/o AA woman with NICM (LVEF now 9%, LVIDd 6.1 cm), initially identified in 2013, s/p CRT-D (6/2017), Mod-Severe MR, Severe TR, HTN, HLD, DM II, and Thyroid Cancer s/p Resection, Surgical Hypoparathyroidism with Chronic Hypocalcemia.\par \par She has had 5 admissions to [facility] this past year and 1 recently to [facility] for ADHF, intermittently requiring IV inotropic support. She has not consistently followed-up with the advanced HF team resulting in suboptimal GDMT for her HF. \par \par She was then readmitted from 11/6/18-11/23/18 for ADHF. Her course was complicated by cardiogenic shock requiring IV inotropic support with dobutamine, which was then later switched to milrinone. She was diuresed on IV bumex. A PICC line was placed and she was discharged home on milrinone at 0.375 mcg/kg/min. Her discharge weight was 166.8 lbs.\par \par She was seen last week on 12/13 at which time her weight was up 3 lbs despite augmentation of diuretics. Following that visit her milrinone was increased, she received IV lasix via home infusion RN, and she was started on losartan 25mg daily as of 12/17 for further afterload reduction. She returns today for follow-up and her weight is up 10 more lbs over the past week. BP 72/52- 85/57 today with last dose of losartan taken yesterday. She denies lightheadedness and dizziness. States her home -107 prior to starting losartan. She reports decreased urination over the past day. Her activity tolerance is unchanged. She does not do much walking outside but has been able to do all her shopping and laundry without difficulty. She can climb a flight of stairs without dyspnea. She sleeps with 1 pillow at night and denies any orthopnea or PND. Denies CP, palpitations, abdominal bloating/pain, worsening LE edema, and fatigue. She states she has been limiting sodium in her diet and limiting fluid to < 2L per day. Her appetite has been normal. She reports her bowel habits have been normal for her. She denies fever, chills, erythema or discharge from PICC line site. Dressing was changed recently by infusion RN. Her ICD has not fired since being home. She endorses compliance with her medications as prescribed.

## 2018-12-19 NOTE — DISCUSSION/SUMMARY
[Patient] : the patient [___ Week(s)] : [unfilled] week(s) [With ___] : with [unfilled] [FreeTextEntry1] : Ms. Carson is a 62 y/o AA woman with dilated NICM (LVEF 9%, LVIDd 6.1 cm) s/p CRT-D, Mod-Severe MR, Severe TR, HLD, DM II, and Thyroid Cancer s/p Resection, Surgical Hypoparathyroidism with Chronic Hypocalcemia. She is AHA/ACC Stage D, NYHA class III. She is hypotensive likely related to increase in milrinone and addition of losartan, although asymptomatic. She is on continuous milrinone infusion, yet despite increased dosing has continued to decline. I recommended the following:\par \par 1. Acute on chronic decompensated systolic heart failure - I have recommended hospital admission for optimization in setting of hypotension and volume overload, however she is currently refusing. Will discontinue losartan. Will give 80mg IV lasix in clinic and continue with bumex 6mg PO BID. Will continue milrinone at 0.5 mcg/kg/min. Will repeat a CMP, Mg, proBNP and lactate today to reassess her electrolytes, kidney function, and LFTs. Will defer on initiation of beta blocker at this time in the setting of decompensated heart failure and volume overload. She will notify us if she continues to have a weight gain of 2 lbs in 2 days or 5 lbs in a week. Reinforced low sodium diet and limiting fluid to < 2 L/day. Discussed with Dr. Gallego.\par \par 2. Hypervolemic hyponatremia - We will recheck sodium level today. If her sodium is less than 125, will consider starting Tolvaptan.\par \par 3. She will return for follow-up with the HF NP on a weekly basis and then with Dr. Gallego in January.

## 2018-12-19 NOTE — ADDENDUM
[FreeTextEntry1] : 12/19 12:45\par Pt returned to clinic for recheck of her BP. Repeat BP 83/59 HR 88. Remains asymptomatic. Again urged her to stay for inpatient admission, however she again refused. Given 80mg IV lasix via PICC line per discussion with Dr. Gallego. Labs from today still pending. Recommended that she check her blood pressure daily and asked that she call/come to the ER if she develops lightheadedness/dizziness or SOB. She will follow up with HF NP next week 12/28 at 11:30.

## 2018-12-19 NOTE — PHYSICAL EXAM
[Well Groomed] : well groomed [General Appearance - In No Acute Distress] : no acute distress [Eyelids - No Xanthelasma] : the eyelids demonstrated no xanthelasmas [No Oral Pallor] : no oral pallor [No Oral Cyanosis] : no oral cyanosis [] : no respiratory distress [Respiration, Rhythm And Depth] : normal respiratory rhythm and effort [Exaggerated Use Of Accessory Muscles For Inspiration] : no accessory muscle use [Auscultation Breath Sounds / Voice Sounds] : lungs were clear to auscultation bilaterally [Heart Rate And Rhythm] : heart rate and rhythm were normal [Heart Sounds] : normal S1 and S2 [Bowel Sounds] : normal bowel sounds [Abdomen Soft] : soft [Abdomen Tenderness] : non-tender [Nail Clubbing] : no clubbing of the fingernails [Cyanosis, Localized] : no localized cyanosis [Skin Color & Pigmentation] : normal skin color and pigmentation [Oriented To Time, Place, And Person] : oriented to person, place, and time [Affect] : the affect was normal [Mood] : the mood was normal [1+] : left 1+ [FreeTextEntry1] : poor insight

## 2018-12-20 LAB
ALBUMIN SERPL ELPH-MCNC: 3.2 G/DL
ALP BLD-CCNC: 305 U/L
ALT SERPL-CCNC: 29 U/L
ANION GAP SERPL CALC-SCNC: 14 MMOL/L
AST SERPL-CCNC: 41 U/L
BILIRUB SERPL-MCNC: 3.3 MG/DL
BUN SERPL-MCNC: 39 MG/DL
CALCIUM SERPL-MCNC: 6.8 MG/DL
CHLORIDE SERPL-SCNC: 88 MMOL/L
CO2 SERPL-SCNC: 24 MMOL/L
CREAT SERPL-MCNC: 1.31 MG/DL
GLUCOSE SERPL-MCNC: 93 MG/DL
LACTATE BLDA-MCNC: 2.5 MMOL/L
MAGNESIUM SERPL-MCNC: 1.9 MG/DL
NT-PROBNP SERPL-MCNC: 1192 PG/ML
POTASSIUM SERPL-SCNC: 5 MMOL/L
PROT SERPL-MCNC: 7.2 G/DL
SODIUM SERPL-SCNC: 126 MMOL/L

## 2018-12-26 ENCOUNTER — OTHER (OUTPATIENT)
Age: 63
End: 2018-12-26

## 2018-12-28 ENCOUNTER — OTHER (OUTPATIENT)
Age: 63
End: 2018-12-28

## 2019-01-01 ENCOUNTER — INPATIENT (INPATIENT)
Facility: HOSPITAL | Age: 64
LOS: 13 days | Discharge: LEFT BEFORE DISCHARGE | DRG: 291 | End: 2019-01-15
Attending: INTERNAL MEDICINE | Admitting: INTERNAL MEDICINE
Payer: COMMERCIAL

## 2019-01-01 VITALS
TEMPERATURE: 97 F | RESPIRATION RATE: 22 BRPM | HEART RATE: 109 BPM | DIASTOLIC BLOOD PRESSURE: 74 MMHG | SYSTOLIC BLOOD PRESSURE: 118 MMHG | OXYGEN SATURATION: 100 % | WEIGHT: 212.97 LBS | HEIGHT: 67 IN

## 2019-01-01 DIAGNOSIS — I50.9 HEART FAILURE, UNSPECIFIED: ICD-10-CM

## 2019-01-01 DIAGNOSIS — Z95.810 PRESENCE OF AUTOMATIC (IMPLANTABLE) CARDIAC DEFIBRILLATOR: Chronic | ICD-10-CM

## 2019-01-01 LAB
ALBUMIN SERPL ELPH-MCNC: 3.7 G/DL — SIGNIFICANT CHANGE UP (ref 3.3–5)
ALP SERPL-CCNC: 337 U/L — HIGH (ref 40–120)
ALT FLD-CCNC: 32 U/L — SIGNIFICANT CHANGE UP (ref 10–45)
ANION GAP SERPL CALC-SCNC: 21 MMOL/L — HIGH (ref 5–17)
APTT BLD: 32 SEC — SIGNIFICANT CHANGE UP (ref 27.5–36.3)
AST SERPL-CCNC: 43 U/L — HIGH (ref 10–40)
BASOPHILS # BLD AUTO: 0.1 K/UL — SIGNIFICANT CHANGE UP (ref 0–0.2)
BASOPHILS NFR BLD AUTO: 1 % — SIGNIFICANT CHANGE UP (ref 0–2)
BILIRUB SERPL-MCNC: 4.2 MG/DL — HIGH (ref 0.2–1.2)
BUN SERPL-MCNC: 28 MG/DL — HIGH (ref 7–23)
CALCIUM SERPL-MCNC: 6.5 MG/DL — CRITICAL LOW (ref 8.4–10.5)
CHLORIDE SERPL-SCNC: 85 MMOL/L — LOW (ref 96–108)
CO2 SERPL-SCNC: 24 MMOL/L — SIGNIFICANT CHANGE UP (ref 22–31)
CREAT SERPL-MCNC: 1.4 MG/DL — HIGH (ref 0.5–1.3)
EOSINOPHIL # BLD AUTO: 0 K/UL — SIGNIFICANT CHANGE UP (ref 0–0.5)
EOSINOPHIL NFR BLD AUTO: 0.4 % — SIGNIFICANT CHANGE UP (ref 0–6)
GAS PNL BLDV: SIGNIFICANT CHANGE UP
GLUCOSE SERPL-MCNC: 175 MG/DL — HIGH (ref 70–99)
HCT VFR BLD CALC: 30.9 % — LOW (ref 34.5–45)
HGB BLD-MCNC: 9.9 G/DL — LOW (ref 11.5–15.5)
INR BLD: 2.83 RATIO — HIGH (ref 0.88–1.16)
LYMPHOCYTES # BLD AUTO: 0.8 K/UL — LOW (ref 1–3.3)
LYMPHOCYTES # BLD AUTO: 11.5 % — LOW (ref 13–44)
MAGNESIUM SERPL-MCNC: 1.7 MG/DL — SIGNIFICANT CHANGE UP (ref 1.6–2.6)
MCHC RBC-ENTMCNC: 23 PG — LOW (ref 27–34)
MCHC RBC-ENTMCNC: 32 GM/DL — SIGNIFICANT CHANGE UP (ref 32–36)
MCV RBC AUTO: 71.9 FL — LOW (ref 80–100)
MONOCYTES # BLD AUTO: 1.1 K/UL — HIGH (ref 0–0.9)
MONOCYTES NFR BLD AUTO: 16.7 % — HIGH (ref 2–14)
NEUTROPHILS # BLD AUTO: 4.7 K/UL — SIGNIFICANT CHANGE UP (ref 1.8–7.4)
NEUTROPHILS NFR BLD AUTO: 70.4 % — SIGNIFICANT CHANGE UP (ref 43–77)
NT-PROBNP SERPL-SCNC: 2026 PG/ML — HIGH (ref 0–300)
PLATELET # BLD AUTO: 290 K/UL — SIGNIFICANT CHANGE UP (ref 150–400)
POTASSIUM SERPL-MCNC: 4.3 MMOL/L — SIGNIFICANT CHANGE UP (ref 3.5–5.3)
POTASSIUM SERPL-SCNC: 4.3 MMOL/L — SIGNIFICANT CHANGE UP (ref 3.5–5.3)
PROT SERPL-MCNC: 7.7 G/DL — SIGNIFICANT CHANGE UP (ref 6–8.3)
PROTHROM AB SERPL-ACNC: 33.3 SEC — HIGH (ref 10–12.9)
RBC # BLD: 4.3 M/UL — SIGNIFICANT CHANGE UP (ref 3.8–5.2)
RBC # FLD: 22.5 % — HIGH (ref 10.3–14.5)
SODIUM SERPL-SCNC: 130 MMOL/L — LOW (ref 135–145)
TROPONIN T, HIGH SENSITIVITY RESULT: 26 NG/L — SIGNIFICANT CHANGE UP (ref 0–51)
TROPONIN T, HIGH SENSITIVITY RESULT: 29 NG/L — SIGNIFICANT CHANGE UP (ref 0–51)
WBC # BLD: 6.7 K/UL — SIGNIFICANT CHANGE UP (ref 3.8–10.5)
WBC # FLD AUTO: 6.7 K/UL — SIGNIFICANT CHANGE UP (ref 3.8–10.5)

## 2019-01-01 PROCEDURE — 99285 EMERGENCY DEPT VISIT HI MDM: CPT

## 2019-01-01 PROCEDURE — 71045 X-RAY EXAM CHEST 1 VIEW: CPT | Mod: 26

## 2019-01-01 PROCEDURE — 99223 1ST HOSP IP/OBS HIGH 75: CPT

## 2019-01-01 RX ORDER — CALCIUM ACETATE 667 MG
667 TABLET ORAL
Qty: 0 | Refills: 0 | Status: DISCONTINUED | OUTPATIENT
Start: 2019-01-01 | End: 2019-01-15

## 2019-01-01 RX ORDER — GABAPENTIN 400 MG/1
200 CAPSULE ORAL THREE TIMES A DAY
Qty: 0 | Refills: 0 | Status: DISCONTINUED | OUTPATIENT
Start: 2019-01-01 | End: 2019-01-15

## 2019-01-01 RX ORDER — DEXTROSE 50 % IN WATER 50 %
15 SYRINGE (ML) INTRAVENOUS ONCE
Qty: 0 | Refills: 0 | Status: DISCONTINUED | OUTPATIENT
Start: 2019-01-01 | End: 2019-01-15

## 2019-01-01 RX ORDER — SPIRONOLACTONE 25 MG/1
50 TABLET, FILM COATED ORAL DAILY
Qty: 0 | Refills: 0 | Status: DISCONTINUED | OUTPATIENT
Start: 2019-01-01 | End: 2019-01-07

## 2019-01-01 RX ORDER — POTASSIUM CHLORIDE 20 MEQ
20 PACKET (EA) ORAL DAILY
Qty: 0 | Refills: 0 | Status: DISCONTINUED | OUTPATIENT
Start: 2019-01-01 | End: 2019-01-04

## 2019-01-01 RX ORDER — MAGNESIUM OXIDE 400 MG ORAL TABLET 241.3 MG
400 TABLET ORAL
Qty: 0 | Refills: 0 | Status: DISCONTINUED | OUTPATIENT
Start: 2019-01-01 | End: 2019-01-15

## 2019-01-01 RX ORDER — INSULIN LISPRO 100/ML
VIAL (ML) SUBCUTANEOUS
Qty: 0 | Refills: 0 | Status: DISCONTINUED | OUTPATIENT
Start: 2019-01-01 | End: 2019-01-15

## 2019-01-01 RX ORDER — GLUCAGON INJECTION, SOLUTION 0.5 MG/.1ML
1 INJECTION, SOLUTION SUBCUTANEOUS ONCE
Qty: 0 | Refills: 0 | Status: DISCONTINUED | OUTPATIENT
Start: 2019-01-01 | End: 2019-01-15

## 2019-01-01 RX ORDER — HEPARIN SODIUM 5000 [USP'U]/ML
5000 INJECTION INTRAVENOUS; SUBCUTANEOUS EVERY 12 HOURS
Qty: 0 | Refills: 0 | Status: DISCONTINUED | OUTPATIENT
Start: 2019-01-01 | End: 2019-01-02

## 2019-01-01 RX ORDER — BUMETANIDE 0.25 MG/ML
6 INJECTION INTRAMUSCULAR; INTRAVENOUS
Qty: 0 | Refills: 0 | Status: DISCONTINUED | OUTPATIENT
Start: 2019-01-01 | End: 2019-01-02

## 2019-01-01 RX ORDER — DEXTROSE 50 % IN WATER 50 %
25 SYRINGE (ML) INTRAVENOUS ONCE
Qty: 0 | Refills: 0 | Status: DISCONTINUED | OUTPATIENT
Start: 2019-01-01 | End: 2019-01-15

## 2019-01-01 RX ORDER — ACETAMINOPHEN 500 MG
650 TABLET ORAL EVERY 6 HOURS
Qty: 0 | Refills: 0 | Status: DISCONTINUED | OUTPATIENT
Start: 2019-01-01 | End: 2019-01-15

## 2019-01-01 RX ORDER — ASPIRIN/CALCIUM CARB/MAGNESIUM 324 MG
81 TABLET ORAL DAILY
Qty: 0 | Refills: 0 | Status: DISCONTINUED | OUTPATIENT
Start: 2019-01-01 | End: 2019-01-15

## 2019-01-01 RX ORDER — PANTOPRAZOLE SODIUM 20 MG/1
40 TABLET, DELAYED RELEASE ORAL
Qty: 0 | Refills: 0 | Status: DISCONTINUED | OUTPATIENT
Start: 2019-01-01 | End: 2019-01-15

## 2019-01-01 RX ORDER — CALCIUM GLUCONATE 100 MG/ML
2 VIAL (ML) INTRAVENOUS ONCE
Qty: 0 | Refills: 0 | Status: COMPLETED | OUTPATIENT
Start: 2019-01-01 | End: 2019-01-01

## 2019-01-01 RX ORDER — SODIUM CHLORIDE 9 MG/ML
1000 INJECTION, SOLUTION INTRAVENOUS
Qty: 0 | Refills: 0 | Status: DISCONTINUED | OUTPATIENT
Start: 2019-01-01 | End: 2019-01-15

## 2019-01-01 RX ORDER — FUROSEMIDE 40 MG
80 TABLET ORAL ONCE
Qty: 0 | Refills: 0 | Status: COMPLETED | OUTPATIENT
Start: 2019-01-01 | End: 2019-01-01

## 2019-01-01 RX ORDER — LEVOTHYROXINE SODIUM 125 MCG
175 TABLET ORAL DAILY
Qty: 0 | Refills: 0 | Status: DISCONTINUED | OUTPATIENT
Start: 2019-01-01 | End: 2019-01-15

## 2019-01-01 RX ORDER — CALCITRIOL 0.5 UG/1
0.5 CAPSULE ORAL DAILY
Qty: 0 | Refills: 0 | Status: DISCONTINUED | OUTPATIENT
Start: 2019-01-01 | End: 2019-01-11

## 2019-01-01 RX ORDER — MILRINONE LACTATE 1 MG/ML
0.47 INJECTION, SOLUTION INTRAVENOUS
Qty: 20 | Refills: 0 | Status: DISCONTINUED | OUTPATIENT
Start: 2019-01-01 | End: 2019-01-02

## 2019-01-01 RX ORDER — INSULIN LISPRO 100/ML
VIAL (ML) SUBCUTANEOUS AT BEDTIME
Qty: 0 | Refills: 0 | Status: DISCONTINUED | OUTPATIENT
Start: 2019-01-01 | End: 2019-01-15

## 2019-01-01 RX ADMIN — Medication 80 MILLIGRAM(S): at 21:45

## 2019-01-01 RX ADMIN — Medication 200 GRAM(S): at 22:43

## 2019-01-01 NOTE — H&P ADULT - PROBLEM SELECTOR PLAN 1
on milrinone infusion and oral Bumex however having poor response ,  discussed with cardiology fellow who deferred management until patient can be evaluated by heart failure team in the day time,  for now will continue current milrinone and bumex dose  - Cardiology consult - to be f/u by day team   - Heart failure consult - to be arranged by day team   - continue Bumex   - continue milrinone    - strict and outs   - daily weight   - bid electrolytes on milrinone infusion and oral Bumex however having poor response ,  discussed with cardiology fellow who deferred management until patient can be evaluated by heart failure team in the day time,  for now will continue current milrinone and bumex dose  - Cardiology consult - to be f/u by day team   - Heart failure consult - to be arranged by day team   - continue Bumex   - continue milrinone    - strict and outs   - daily weight   - bid electrolytes  - continue spironolactone

## 2019-01-01 NOTE — ED PROVIDER NOTE - OBJECTIVE STATEMENT
64 y/o female PMHx CHFrEF (mod-severe MR, EF 10% 11/2018) s/p ICD with PICC line for milrinone 200mg infusions  q48hrs from visiting home NP and lasix 80mg three times weekly (last dose yesterday), thyroid ca s/p resection, HTN, DM, presenting to the ED for worsening SOB x1 week. Patient feels dyspneic while walking few feet, clear productive cough, PND, peripheral edema but denied orthopnea. Patient also reported severe abdominal distension causing patient to have intermittent chest pressure. Patient stated she has had 40 pound unintentional weight gain since she was discharged 11/2018. Patient denied fever, chills, abdominal pain, N/V/D, headache, dizziness

## 2019-01-01 NOTE — ED PROVIDER NOTE - MEDICAL DECISION MAKING DETAILS
DDx: decompensated CHF. Will order cardiac workup, start Lasix, consult Cardiology, will need admission.  ATTG: Dr. Meadows

## 2019-01-01 NOTE — ED PROVIDER NOTE - PROGRESS NOTE DETAILS
RICHARD Thao: Patient hypocalcemic therefore will give calcium gluconate 2g x1. ED attending Dr. Moya aware and agreed

## 2019-01-01 NOTE — ED PROVIDER NOTE - TOBACCO USE
I have personally performed a history and physical exam on this patient and personally directed the management of the patient. Patient presents for evaluation of b/l knee pain.  it is b/l throbbing moderate in nature she denies any trauma or falls.  She denies any redness.  She denies any fevers or chills. On physical exam sshe is nc/at perrla eomi oropharynx clear cta b/l, rrr s1s2 noted abd-soft nt nd bs +e x t - patient has ttp of the b/l patella, worse with palpation no redness no swelling pedal pulses 2 +=.  we obtained xrays, labs and administered IVfluids, we advised potential admission as the patient is unable to ambualte but prior to completing treatment she eloped from the department. Unknown if ever smoked

## 2019-01-01 NOTE — H&P ADULT - NSHPREVIEWOFSYSTEMS_GEN_ALL_CORE
CONSTITUTIONAL: +weakness, no  fevers or chills  EYES/ENT: No visual changes;  No dysphagia  NECK: No pain or stiffness  RESPIRATORY:+ cough, no wheezing, no hemoptysis; + shortness of breath  CARDIOVASCULAR: No chest pain or palpitations; + lower extremity edema  EXTREMITIES: + le edema, no  cyanosis, clubbing  GASTROINTESTINAL: No abdominal or epigastric pain. No nausea, vomiting, or hematemesis; No diarrhea or constipation. No melena or hematochezia.  BACK: No back pain  GENITOURINARY: No dysuria, frequency or hematuria  NEUROLOGICAL: No numbness or weakness  MSK: no joint swelling or pain  SKIN: No itching, burning, rashes, or lesions   PSYCH: no agitation  All other review of systems is negative unless indicated above.

## 2019-01-01 NOTE — H&P ADULT - NSHPPHYSICALEXAM_GEN_ALL_CORE
Vital Signs Last 24 Hrs  T(C): 36.8 (01 Jan 2019 21:41), Max: 36.8 (01 Jan 2019 21:41)  T(F): 98.3 (01 Jan 2019 21:41), Max: 98.3 (01 Jan 2019 21:41)  HR: 102 (01 Jan 2019 23:23) (101 - 109)  BP: 121/81 (01 Jan 2019 23:23) (103/72 - 121/81)  BP(mean): --  RR: 20 (01 Jan 2019 23:23) (20 - 22)  SpO2: 100% (01 Jan 2019 23:23) (100% - 100%)    GENERAL: No acute distress, well-developed  HEAD:  Atraumatic, Normocephalic  ENT: EOMI, PERRLA, conjunctiva and sclera clear,  moist mucosa no pharyngeal erythema or exudates   NECK: + JVD   CHEST/LUNG: Clear to auscultation bilaterally; No wheeze, equal breath sounds bilaterally   BACK: No spinal tenderness,  No CVA tenderness   HEART: tachycardic  regular rhythm; No murmurs, rubs, or gallops  ABDOMEN: Distended firm nontender , + fluid wave. Bowel sounds present  EXTREMITIES:  No clubbing, cyanosis, + 2 pitting edema  MSK: No joint swelling or effusions, ROM intact   PSYCH: Normal behavior/affect  NEUROLOGY: AAOx3, non-focal, cranial nerves intact  SKIN: Normal color, No rashes or lesions

## 2019-01-01 NOTE — ED ADULT NURSE NOTE - OBJECTIVE STATEMENT
64 y/o female PMHx CHFrEF (mod-severe MR, EF 10% 11/2018) s/p ICD with PICC line for milrinone 200mg infusions  q48hrs from visiting home NP and lasix 80mg three times weekly (last dose yesterday), HTN, DM, presenting to the ED for worsening SOB x1 week. Patient feels dyspneic while walking few feet, clear productive cough, peripheral edema but denied orthopnea. Patient also reported severe abdominal distension causing patient to have intermittent chest pressure. Patient stated she has had 40 pound unintentional weight gain since she was discharged 11/2018. Patient denied fever, chills, abdominal pain, N/V/D, headache, dizziness

## 2019-01-01 NOTE — H&P ADULT - HISTORY OF PRESENT ILLNESS
62 y/o post-menopausal F w/ PMHx of Hfref (mod-severe MR, EF 10% 11/2018) s/p ICD  s/p  PICC line for milrinone ,   h/o  thyroid CA s/p resection c/b hypoparathyroidism, HTN, DMII, recent admission for vaginal bleeding  and HF exacerbation , presents for SOB x 1 week , Patient feels dyspneic while walking few feet, clear productive cough, PND, peripheral edema but denied orthopnea. Patient also reported severe abdominal distension causing patient to have intermittent chest pressure. Patient stated she has had 40 pound unintentional weight gain since she was discharged 11/2018. She also reports abdominal cramping. Patient denied fever, chills, abdominal pain, N/V/D, headache, dizziness. Patient reports compliance with diet and medical regimen   No family history of heart disease

## 2019-01-01 NOTE — H&P ADULT - NSHPLABSRESULTS_GEN_ALL_CORE
Labs personally reviewed:                          9.9    6.7   )-----------( 290      ( 01 Jan 2019 21:33 )             30.9     01-01    130<L>  |  85<L>  |  28<H>  ----------------------------<  175<H>  4.3   |  24  |  1.40<H>    Ca    6.5<LL>      01 Jan 2019 21:33  Mg     1.7     01-01    TPro  7.7  /  Alb  3.7  /  TBili  4.2<H>  /  DBili  x   /  AST  43<H>  /  ALT  32  /  AlkPhos  337<H>  01-01        LIVER FUNCTIONS - ( 01 Jan 2019 21:33 )  Alb: 3.7 g/dL / Pro: 7.7 g/dL / ALK PHOS: 337 U/L / ALT: 32 U/L / AST: 43 U/L / GGT: x           PT/INR - ( 01 Jan 2019 21:33 )   PT: 33.3 sec;   INR: 2.83 ratio         PTT - ( 01 Jan 2019 21:33 )  PTT:32.0 sec    CAPILLARY BLOOD GLUCOSE    Imaging:  CXR personally reviewed: no focal opacity    EKG personally reviewed: a sensed v paced at 109 bpm

## 2019-01-01 NOTE — H&P ADULT - ASSESSMENT
63F w/ PMHx of Hfref (mod-severe MR, EF 10% 11/2018) s/p ICD  s/p  PICC  on milrinone ,h/o  thyroid CA s/p resection c/b hypoparathyroidism, HTN, DMII, p/w worsening fluid retention

## 2019-01-02 ENCOUNTER — APPOINTMENT (OUTPATIENT)
Dept: CARDIOLOGY | Facility: CLINIC | Age: 64
End: 2019-01-02

## 2019-01-02 DIAGNOSIS — R94.5 ABNORMAL RESULTS OF LIVER FUNCTION STUDIES: ICD-10-CM

## 2019-01-02 DIAGNOSIS — D64.9 ANEMIA, UNSPECIFIED: ICD-10-CM

## 2019-01-02 DIAGNOSIS — I50.9 HEART FAILURE, UNSPECIFIED: ICD-10-CM

## 2019-01-02 DIAGNOSIS — Z29.9 ENCOUNTER FOR PROPHYLACTIC MEASURES, UNSPECIFIED: ICD-10-CM

## 2019-01-02 DIAGNOSIS — E83.51 HYPOCALCEMIA: ICD-10-CM

## 2019-01-02 DIAGNOSIS — I10 ESSENTIAL (PRIMARY) HYPERTENSION: ICD-10-CM

## 2019-01-02 DIAGNOSIS — C73 MALIGNANT NEOPLASM OF THYROID GLAND: ICD-10-CM

## 2019-01-02 DIAGNOSIS — E11.9 TYPE 2 DIABETES MELLITUS WITHOUT COMPLICATIONS: ICD-10-CM

## 2019-01-02 LAB
ALBUMIN SERPL ELPH-MCNC: 3.6 G/DL — SIGNIFICANT CHANGE UP (ref 3.3–5)
ALBUMIN SERPL ELPH-MCNC: 3.8 G/DL — SIGNIFICANT CHANGE UP (ref 3.3–5)
ALP SERPL-CCNC: 316 U/L — HIGH (ref 40–120)
ALP SERPL-CCNC: 326 U/L — HIGH (ref 40–120)
ALT FLD-CCNC: 32 U/L — SIGNIFICANT CHANGE UP (ref 10–45)
ALT FLD-CCNC: 35 U/L — SIGNIFICANT CHANGE UP (ref 10–45)
ANION GAP SERPL CALC-SCNC: 15 MMOL/L — SIGNIFICANT CHANGE UP (ref 5–17)
ANION GAP SERPL CALC-SCNC: 15 MMOL/L — SIGNIFICANT CHANGE UP (ref 5–17)
ANION GAP SERPL CALC-SCNC: 21 MMOL/L — HIGH (ref 5–17)
AST SERPL-CCNC: 43 U/L — HIGH (ref 10–40)
AST SERPL-CCNC: 71 U/L — HIGH (ref 10–40)
BASOPHILS # BLD AUTO: 0.25 K/UL — HIGH (ref 0–0.2)
BASOPHILS NFR BLD AUTO: 3 % — HIGH (ref 0–2)
BILIRUB SERPL-MCNC: 4.4 MG/DL — HIGH (ref 0.2–1.2)
BILIRUB SERPL-MCNC: 4.6 MG/DL — HIGH (ref 0.2–1.2)
BUN SERPL-MCNC: 27 MG/DL — HIGH (ref 7–23)
BUN SERPL-MCNC: 28 MG/DL — HIGH (ref 7–23)
BUN SERPL-MCNC: 29 MG/DL — HIGH (ref 7–23)
CALCIUM SERPL-MCNC: 6.5 MG/DL — CRITICAL LOW (ref 8.4–10.5)
CALCIUM SERPL-MCNC: 6.6 MG/DL — LOW (ref 8.4–10.5)
CALCIUM SERPL-MCNC: 6.7 MG/DL — LOW (ref 8.4–10.5)
CHLORIDE SERPL-SCNC: 86 MMOL/L — LOW (ref 96–108)
CHLORIDE SERPL-SCNC: 86 MMOL/L — LOW (ref 96–108)
CHLORIDE SERPL-SCNC: 89 MMOL/L — LOW (ref 96–108)
CO2 SERPL-SCNC: 21 MMOL/L — LOW (ref 22–31)
CO2 SERPL-SCNC: 23 MMOL/L — SIGNIFICANT CHANGE UP (ref 22–31)
CO2 SERPL-SCNC: 23 MMOL/L — SIGNIFICANT CHANGE UP (ref 22–31)
CREAT SERPL-MCNC: 1.18 MG/DL — SIGNIFICANT CHANGE UP (ref 0.5–1.3)
CREAT SERPL-MCNC: 1.24 MG/DL — SIGNIFICANT CHANGE UP (ref 0.5–1.3)
CREAT SERPL-MCNC: 1.25 MG/DL — SIGNIFICANT CHANGE UP (ref 0.5–1.3)
EOSINOPHIL # BLD AUTO: 0 K/UL — SIGNIFICANT CHANGE UP (ref 0–0.5)
EOSINOPHIL NFR BLD AUTO: 0 % — SIGNIFICANT CHANGE UP (ref 0–6)
GLUCOSE BLDC GLUCOMTR-MCNC: 161 MG/DL — HIGH (ref 70–99)
GLUCOSE BLDC GLUCOMTR-MCNC: 162 MG/DL — HIGH (ref 70–99)
GLUCOSE BLDC GLUCOMTR-MCNC: 181 MG/DL — HIGH (ref 70–99)
GLUCOSE BLDC GLUCOMTR-MCNC: 199 MG/DL — HIGH (ref 70–99)
GLUCOSE SERPL-MCNC: 132 MG/DL — HIGH (ref 70–99)
GLUCOSE SERPL-MCNC: 153 MG/DL — HIGH (ref 70–99)
GLUCOSE SERPL-MCNC: 207 MG/DL — HIGH (ref 70–99)
HCT VFR BLD CALC: 27.3 % — LOW (ref 34.5–45)
HCT VFR BLD CALC: 31.1 % — LOW (ref 34.5–45)
HGB BLD-MCNC: 10.5 G/DL — LOW (ref 11.5–15.5)
HGB BLD-MCNC: 8.9 G/DL — LOW (ref 11.5–15.5)
LYMPHOCYTES # BLD AUTO: 1.24 K/UL — SIGNIFICANT CHANGE UP (ref 1–3.3)
LYMPHOCYTES # BLD AUTO: 15 % — SIGNIFICANT CHANGE UP (ref 13–44)
MAGNESIUM SERPL-MCNC: 1.8 MG/DL — SIGNIFICANT CHANGE UP (ref 1.6–2.6)
MAGNESIUM SERPL-MCNC: 1.8 MG/DL — SIGNIFICANT CHANGE UP (ref 1.6–2.6)
MCHC RBC-ENTMCNC: 22 PG — LOW (ref 27–34)
MCHC RBC-ENTMCNC: 24.2 PG — LOW (ref 27–34)
MCHC RBC-ENTMCNC: 32.6 GM/DL — SIGNIFICANT CHANGE UP (ref 32–36)
MCHC RBC-ENTMCNC: 33.6 GM/DL — SIGNIFICANT CHANGE UP (ref 32–36)
MCV RBC AUTO: 67.4 FL — LOW (ref 80–100)
MCV RBC AUTO: 72.2 FL — LOW (ref 80–100)
MONOCYTES # BLD AUTO: 1.07 K/UL — HIGH (ref 0–0.9)
MONOCYTES NFR BLD AUTO: 13 % — SIGNIFICANT CHANGE UP (ref 2–14)
NEUTROPHILS # BLD AUTO: 5.36 K/UL — SIGNIFICANT CHANGE UP (ref 1.8–7.4)
NEUTROPHILS NFR BLD AUTO: 62 % — SIGNIFICANT CHANGE UP (ref 43–77)
PLATELET # BLD AUTO: 278 K/UL — SIGNIFICANT CHANGE UP (ref 150–400)
PLATELET # BLD AUTO: SIGNIFICANT CHANGE UP K/UL (ref 150–400)
POTASSIUM SERPL-MCNC: 3.8 MMOL/L — SIGNIFICANT CHANGE UP (ref 3.5–5.3)
POTASSIUM SERPL-MCNC: 4 MMOL/L — SIGNIFICANT CHANGE UP (ref 3.5–5.3)
POTASSIUM SERPL-MCNC: >9 MMOL/L — CRITICAL HIGH (ref 3.5–5.3)
POTASSIUM SERPL-SCNC: 3.8 MMOL/L — SIGNIFICANT CHANGE UP (ref 3.5–5.3)
POTASSIUM SERPL-SCNC: 4 MMOL/L — SIGNIFICANT CHANGE UP (ref 3.5–5.3)
POTASSIUM SERPL-SCNC: >9 MMOL/L — CRITICAL HIGH (ref 3.5–5.3)
PROT SERPL-MCNC: 7.8 G/DL — SIGNIFICANT CHANGE UP (ref 6–8.3)
PROT SERPL-MCNC: 8.3 G/DL — SIGNIFICANT CHANGE UP (ref 6–8.3)
RBC # BLD: 4.05 M/UL — SIGNIFICANT CHANGE UP (ref 3.8–5.2)
RBC # BLD: 4.32 M/UL — SIGNIFICANT CHANGE UP (ref 3.8–5.2)
RBC # FLD: 22.4 % — HIGH (ref 10.3–14.5)
RBC # FLD: 22.8 % — HIGH (ref 10.3–14.5)
SODIUM SERPL-SCNC: 122 MMOL/L — LOW (ref 135–145)
SODIUM SERPL-SCNC: 127 MMOL/L — LOW (ref 135–145)
SODIUM SERPL-SCNC: 130 MMOL/L — LOW (ref 135–145)
WBC # BLD: 6.6 K/UL — SIGNIFICANT CHANGE UP (ref 3.8–10.5)
WBC # BLD: 8.24 K/UL — SIGNIFICANT CHANGE UP (ref 3.8–10.5)
WBC # FLD AUTO: 6.6 K/UL — SIGNIFICANT CHANGE UP (ref 3.8–10.5)
WBC # FLD AUTO: 8.24 K/UL — SIGNIFICANT CHANGE UP (ref 3.8–10.5)

## 2019-01-02 PROCEDURE — 99255 IP/OBS CONSLTJ NEW/EST HI 80: CPT

## 2019-01-02 RX ORDER — BUMETANIDE 0.25 MG/ML
1 INJECTION INTRAMUSCULAR; INTRAVENOUS
Qty: 20 | Refills: 0 | Status: DISCONTINUED | OUTPATIENT
Start: 2019-01-02 | End: 2019-01-08

## 2019-01-02 RX ORDER — BUMETANIDE 0.25 MG/ML
3 INJECTION INTRAMUSCULAR; INTRAVENOUS
Qty: 0 | Refills: 0 | Status: DISCONTINUED | OUTPATIENT
Start: 2019-01-02 | End: 2019-01-02

## 2019-01-02 RX ORDER — CALCIUM GLUCONATE 100 MG/ML
1 VIAL (ML) INTRAVENOUS ONCE
Qty: 0 | Refills: 0 | Status: COMPLETED | OUTPATIENT
Start: 2019-01-02 | End: 2019-01-02

## 2019-01-02 RX ORDER — POTASSIUM CHLORIDE 20 MEQ
20 PACKET (EA) ORAL ONCE
Qty: 0 | Refills: 0 | Status: COMPLETED | OUTPATIENT
Start: 2019-01-02 | End: 2019-01-03

## 2019-01-02 RX ORDER — MAGNESIUM SULFATE 500 MG/ML
1 VIAL (ML) INJECTION ONCE
Qty: 0 | Refills: 0 | Status: COMPLETED | OUTPATIENT
Start: 2019-01-02 | End: 2019-01-03

## 2019-01-02 RX ORDER — HYDRALAZINE HCL 50 MG
10 TABLET ORAL EVERY 8 HOURS
Qty: 0 | Refills: 0 | Status: DISCONTINUED | OUTPATIENT
Start: 2019-01-02 | End: 2019-01-15

## 2019-01-02 RX ORDER — MILRINONE LACTATE 1 MG/ML
0.5 INJECTION, SOLUTION INTRAVENOUS
Qty: 20 | Refills: 0 | Status: DISCONTINUED | OUTPATIENT
Start: 2019-01-02 | End: 2019-01-11

## 2019-01-02 RX ORDER — MAGNESIUM OXIDE 400 MG ORAL TABLET 241.3 MG
400 TABLET ORAL ONCE
Qty: 0 | Refills: 0 | Status: COMPLETED | OUTPATIENT
Start: 2019-01-02 | End: 2019-01-02

## 2019-01-02 RX ADMIN — BUMETANIDE 3 MILLIGRAM(S): 0.25 INJECTION INTRAMUSCULAR; INTRAVENOUS at 05:17

## 2019-01-02 RX ADMIN — PANTOPRAZOLE SODIUM 40 MILLIGRAM(S): 20 TABLET, DELAYED RELEASE ORAL at 05:18

## 2019-01-02 RX ADMIN — MAGNESIUM OXIDE 400 MG ORAL TABLET 400 MILLIGRAM(S): 241.3 TABLET ORAL at 18:10

## 2019-01-02 RX ADMIN — GABAPENTIN 200 MILLIGRAM(S): 400 CAPSULE ORAL at 05:18

## 2019-01-02 RX ADMIN — Medication 1: at 18:10

## 2019-01-02 RX ADMIN — Medication 1: at 13:25

## 2019-01-02 RX ADMIN — Medication 1 TABLET(S): at 05:18

## 2019-01-02 RX ADMIN — Medication 667 MILLIGRAM(S): at 18:10

## 2019-01-02 RX ADMIN — Medication 81 MILLIGRAM(S): at 13:25

## 2019-01-02 RX ADMIN — SPIRONOLACTONE 50 MILLIGRAM(S): 25 TABLET, FILM COATED ORAL at 05:18

## 2019-01-02 RX ADMIN — MAGNESIUM OXIDE 400 MG ORAL TABLET 400 MILLIGRAM(S): 241.3 TABLET ORAL at 01:27

## 2019-01-02 RX ADMIN — Medication 200 GRAM(S): at 23:22

## 2019-01-02 RX ADMIN — BUMETANIDE 5 MG/HR: 0.25 INJECTION INTRAMUSCULAR; INTRAVENOUS at 17:06

## 2019-01-02 RX ADMIN — MILRINONE LACTATE 14.76 MICROGRAM(S)/KG/MIN: 1 INJECTION, SOLUTION INTRAVENOUS at 18:11

## 2019-01-02 RX ADMIN — Medication 1: at 09:52

## 2019-01-02 RX ADMIN — Medication 175 MICROGRAM(S): at 05:19

## 2019-01-02 RX ADMIN — GABAPENTIN 200 MILLIGRAM(S): 400 CAPSULE ORAL at 22:33

## 2019-01-02 RX ADMIN — MAGNESIUM OXIDE 400 MG ORAL TABLET 400 MILLIGRAM(S): 241.3 TABLET ORAL at 09:52

## 2019-01-02 RX ADMIN — MILRINONE LACTATE 13.6 MICROGRAM(S)/KG/MIN: 1 INJECTION, SOLUTION INTRAVENOUS at 01:03

## 2019-01-02 RX ADMIN — Medication 1 TABLET(S): at 22:33

## 2019-01-02 RX ADMIN — Medication 20 MILLIEQUIVALENT(S): at 13:25

## 2019-01-02 RX ADMIN — Medication 10 MILLIGRAM(S): at 22:32

## 2019-01-02 NOTE — CONSULT NOTE ADULT - SUBJECTIVE AND OBJECTIVE BOX
Reason for Consultation: ADHF  Chief Complaint: SOB  Date of Admission: 1/2/19      HPI:  62 y/o post-menopausal F w/ PMHx of Hfref (mod-severe MR, EF 10% 11/2018) s/p ICD  s/p  PICC line for milrinone ,   h/o  thyroid CA s/p resection c/b hypoparathyroidism, HTN, DMII, recent admission for vaginal bleeding  and HF exacerbation , presents for SOB x 1 week , Patient feels dyspneic while walking few feet, clear productive cough, PND, peripheral edema but denied orthopnea. Patient also reported severe abdominal distension causing patient to have intermittent chest pressure. Patient stated she has had 40 pound unintentional weight gain since she was discharged 11/2018. She also reports abdominal cramping. Patient denied fever, chills, abdominal pain, N/V/D, headache, dizziness. Patient reports compliance with diet and medical regimen   No family history of heart disease (01 Jan 2019 23:29)    Past Medical History:   Asthma  CHF (congestive heart failure)  DM (diabetes mellitus)  HTN (hypertension)  Thyroid ca    Past Surgical History:   AICD (automatic cardioverter/defibrillator) present  S/P thyroidectomy    Medications:   acetaminophen   Tablet .. 650 milliGRAM(s) Oral every 6 hours PRN  aspirin enteric coated 81 milliGRAM(s) Oral daily  buMETAnide 3 milliGRAM(s) Oral two times a day  calcitriol   Capsule 0.5 MICROGram(s) Oral daily  calcium acetate 667 milliGRAM(s) Oral three times a day with meals  calcium carbonate 1250 mG  + Vitamin D (OsCal 500 + D) 1 Tablet(s) Oral three times a day  dextrose 40% Gel 15 Gram(s) Oral once PRN  dextrose 5%. 1000 milliLiter(s) IV Continuous <Continuous>  dextrose 50% Injectable 25 Gram(s) IV Push once  gabapentin 200 milliGRAM(s) Oral three times a day  glucagon  Injectable 1 milliGRAM(s) IntraMuscular once PRN  insulin lispro (HumaLOG) corrective regimen sliding scale   SubCutaneous three times a day before meals  insulin lispro (HumaLOG) corrective regimen sliding scale   SubCutaneous at bedtime  levothyroxine 175 MICROGram(s) Oral daily  magnesium oxide 400 milliGRAM(s) Oral once  magnesium oxide 400 milliGRAM(s) Oral two times a day with meals  milrinone Infusion 0.469 MICROgram(s)/kG/Min IV Continuous <Continuous>  pantoprazole    Tablet 40 milliGRAM(s) Oral before breakfast  potassium chloride    Tablet ER 20 milliEquivalent(s) Oral daily  rifaximin 550 milliGRAM(s) Oral two times a day  spironolactone 50 milliGRAM(s) Oral daily    Allergies:  Isordil (Headache)  penicillin (Rash)    FAMILY HISTORY:  No pertinent family history in first degree relatives    Social History:  Smoking History:  Alcohol Use:  Drug Use:    Review of Systems:  REVIEW OF SYSTEMS:  CONSTITUTIONAL: No weakness, fevers or chills  EYES/ENT: No visual changes;  No dysphagia  NECK: No pain or stiffness  RESPIRATORY: No cough, wheezing, hemoptysis; No shortness of breath  CARDIOVASCULAR: No chest pain or palpitations; No lower extremity edema  GASTROINTESTINAL: No abdominal or epigastric pain. No nausea, vomiting, or hematemesis; No diarrhea or constipation. No melena or hematochezia.  BACK: No back pain  GENITOURINARY: No dysuria, frequency or hematuria  NEUROLOGICAL: No numbness or weakness  SKIN: No itching, burning, rashes, or lesions   All other review of systems is negative unless indicated above.    Vitals:  T(F): 98.3 (01-01), Max: 98.3 (01-01)  HR: 102 (01-01) (101 - 109)  BP: 121/81 (01-01) (103/72 - 121/81)  RR: 20 (01-01)  SpO2: 100% (01-01)    Physical Exam:  GENERAL: No acute distress, well-developed  HEAD:  Atraumatic, Normocephalic  ENT: EOMI, PERRLA, conjunctiva and sclera clear, Neck supple, No JVD, moist mucosa  CHEST/LUNG: Clear to auscultation bilaterally; No wheeze, equal breath sounds bilaterally   BACK: No spinal tenderness  HEART: Regular rate and rhythm; No murmurs, rubs, or gallops  ABDOMEN: Soft, Nontender, Nondistended; Bowel sounds present  EXTREMITIES:  No clubbing, cyanosis, or edema  PSYCH: Nl behavior, nl affect  NEUROLOGY: AAOx3, non-focal, cranial nerves intact  SKIN: Normal color, No rashes or lesions      Cardiovascular Diagnostic Testing:    Interpretation of Tele:    ECG: Personally reviewed    Echo:  < from: TTE with Doppler (w/Cont) (11.07.18 @ 05:53) >  Conclusions:  1. Tethered mitral valve leaflets with normal opening.  Mitral annular calcification and thickened  mitral leaflet  tips Moderate mitral regurgitation.  2. Calcified trileaflet aortic valve with normal opening.  3. Moderately dilated left atrium.  LA volume index = 43  cc/m2.  4. Eccentric left ventricular hypertrophy (dilated left  ventricle with normal relative wall thickness).  5. Severe global left ventricularsystolic dysfunction.  Endocardial visualization enhanced with intravenous  injection of Ultrasonic Enhancing Agent (Definity). No LV  thrombus.  Septal flattening consistent with right  ventricular overload.  6. Severe diastolic dysfunction with elevated filling  pressures  7. Severe right atrial enlargement.  8. Right ventricular enlargement with decreased right  ventricular systolic function.  A device wire is noted in  the right heart.  9. Dilated tricuspid annulus with malcoaptation of  tricuspid leaflets. Severe tricuspid regurgitation.    Stress Testing:    Cath:  < from: Cardiac Cath Lab (01.08.14 @ 16:49) >  CORONARY VESSELS: The coronary circulation is co-dominant.  LM:   -- LM: Normal.  LAD:   --  LAD: Normal.  CX:   --  Circumflex: Normal.  RCA:   --  RCA: Normal.  COMPLICATIONS: There were no complications.    Imaging:    Labs: Personally reviewed                        9.9    6.7   )-----------( 290      ( 01 Jan 2019 21:33 )             30.9     01-01    130<L>  |  85<L>  |  28<H>  ----------------------------<  175<H>  4.3   |  24  |  1.40<H>    Ca    6.5<LL>      01 Jan 2019 21:33  Mg     1.7     01-01    TPro  7.7  /  Alb  3.7  /  TBili  4.2<H>  /  DBili  x   /  AST  43<H>  /  ALT  32  /  AlkPhos  337<H>  01-01    PT/INR - ( 01 Jan 2019 21:33 )   PT: 33.3 sec;   INR: 2.83 ratio         PTT - ( 01 Jan 2019 21:33 )  PTT:32.0 sec      Serum Pro-Brain Natriuretic Peptide: 2026 pg/mL (01-01 @ 21:33) Reason for Consultation: ADHF  Chief Complaint: SOB  Date of Admission: 1/2/19      HPI:  63 year old  woman with NICM (EF 10%, LVIDD 6.1cm, dx 2013) s/p CRT-D (6/2017), Mod-Severe MR, Severe TR, HTN, HLD, DM2, and Thyroid Cancer s/p resection c/b hypoparathyroidism with chronic hypocalcemia. Presents to ED due to SOB and weight gain.     Recent admission from 11/6 to 11/23 for ADHF with course c/b cardiogenic shock. Discharged on home milrinone 0.375mch/kg/min with discharge weigh of 166 lbs. Seen in HF clinic on 12/5 where her weight was up 17lbs since discharge, so Bumex was increased from 3mg BID to 6mg BID. Returned for follow-up 12/13 at which time her weight was 187lbs, up 3lbs from December 5th despite augmentation of diuretics. Following that visit her milrinone was increased and she received Lasix IV via home infusion and started on Losartan 25mg daily for afterload reduction. Returned follow-up 12/18 with additional weight gain of 10lbs.     No fevers, chill, cough, chest pain, palpitations, nausea, vomiting, abdominal pain, urinary symptoms.     Currently, HDS on room air.    Past Medical History:   Asthma  CHF (congestive heart failure)  DM (diabetes mellitus)  HTN (hypertension)  Thyroid ca    Past Surgical History:   AICD (automatic cardioverter/defibrillator) present  S/P thyroidectomy    Medications:   acetaminophen   Tablet .. 650 milliGRAM(s) Oral every 6 hours PRN  aspirin enteric coated 81 milliGRAM(s) Oral daily  buMETAnide 3 milliGRAM(s) Oral two times a day  calcitriol   Capsule 0.5 MICROGram(s) Oral daily  calcium acetate 667 milliGRAM(s) Oral three times a day with meals  calcium carbonate 1250 mG  + Vitamin D (OsCal 500 + D) 1 Tablet(s) Oral three times a day  dextrose 40% Gel 15 Gram(s) Oral once PRN  dextrose 5%. 1000 milliLiter(s) IV Continuous <Continuous>  dextrose 50% Injectable 25 Gram(s) IV Push once  gabapentin 200 milliGRAM(s) Oral three times a day  glucagon  Injectable 1 milliGRAM(s) IntraMuscular once PRN  insulin lispro (HumaLOG) corrective regimen sliding scale   SubCutaneous three times a day before meals  insulin lispro (HumaLOG) corrective regimen sliding scale   SubCutaneous at bedtime  levothyroxine 175 MICROGram(s) Oral daily  magnesium oxide 400 milliGRAM(s) Oral once  magnesium oxide 400 milliGRAM(s) Oral two times a day with meals  milrinone Infusion 0.469 MICROgram(s)/kG/Min IV Continuous <Continuous>  pantoprazole    Tablet 40 milliGRAM(s) Oral before breakfast  potassium chloride    Tablet ER 20 milliEquivalent(s) Oral daily  rifaximin 550 milliGRAM(s) Oral two times a day  spironolactone 50 milliGRAM(s) Oral daily    Allergies:  Isordil (Headache)  penicillin (Rash)    FAMILY HISTORY:  No pertinent family history in first degree relatives    Social History:  Smoking History:  Alcohol Use:  Drug Use:    Review of Systems:  REVIEW OF SYSTEMS:  CONSTITUTIONAL: No weakness, fevers or chills  EYES/ENT: No visual changes;  No dysphagia  NECK: No pain or stiffness  RESPIRATORY: No cough, wheezing, hemoptysis; No shortness of breath  CARDIOVASCULAR: No chest pain or palpitations; No lower extremity edema  GASTROINTESTINAL: No abdominal or epigastric pain. No nausea, vomiting, or hematemesis; No diarrhea or constipation. No melena or hematochezia.  BACK: No back pain  GENITOURINARY: No dysuria, frequency or hematuria  NEUROLOGICAL: No numbness or weakness  SKIN: No itching, burning, rashes, or lesions   All other review of systems is negative unless indicated above.    Vitals:  T(F): 98.3 (01-01), Max: 98.3 (01-01)  HR: 102 (01-01) (101 - 109)  BP: 121/81 (01-01) (103/72 - 121/81)  RR: 20 (01-01)  SpO2: 100% (01-01)    Physical Exam:  GENERAL: No acute distress, well-developed  HEAD:  Atraumatic, Normocephalic  ENT: EOMI, PERRLA, conjunctiva and sclera clear, Neck supple, No JVD, moist mucosa  CHEST/LUNG: Clear to auscultation bilaterally; No wheeze, equal breath sounds bilaterally   BACK: No spinal tenderness  HEART: Regular rate and rhythm; No murmurs, rubs, or gallops  ABDOMEN: Soft, Nontender, Nondistended; Bowel sounds present  EXTREMITIES:  No clubbing, cyanosis, or edema  PSYCH: Nl behavior, nl affect  NEUROLOGY: AAOx3, non-focal, cranial nerves intact  SKIN: Normal color, No rashes or lesions      Cardiovascular Diagnostic Testing:    Interpretation of Tele:    ECG: Personally reviewed    Echo:  < from: TTE with Doppler (w/Cont) (11.07.18 @ 05:53) >  Conclusions:  1. Tethered mitral valve leaflets with normal opening.  Mitral annular calcification and thickened  mitral leaflet  tips Moderate mitral regurgitation.  2. Calcified trileaflet aortic valve with normal opening.  3. Moderately dilated left atrium.  LA volume index = 43  cc/m2.  4. Eccentric left ventricular hypertrophy (dilated left  ventricle with normal relative wall thickness).  5. Severe global left ventricularsystolic dysfunction.  Endocardial visualization enhanced with intravenous  injection of Ultrasonic Enhancing Agent (Definity). No LV  thrombus.  Septal flattening consistent with right  ventricular overload.  6. Severe diastolic dysfunction with elevated filling  pressures  7. Severe right atrial enlargement.  8. Right ventricular enlargement with decreased right  ventricular systolic function.  A device wire is noted in  the right heart.  9. Dilated tricuspid annulus with malcoaptation of  tricuspid leaflets. Severe tricuspid regurgitation.    Stress Testing:    Cath:  < from: Cardiac Cath Lab (01.08.14 @ 16:49) >  CORONARY VESSELS: The coronary circulation is co-dominant.  LM:   -- LM: Normal.  LAD:   --  LAD: Normal.  CX:   --  Circumflex: Normal.  RCA:   --  RCA: Normal.  COMPLICATIONS: There were no complications.    Imaging:    Labs: Personally reviewed                        9.9    6.7   )-----------( 290      ( 01 Jan 2019 21:33 )             30.9     01-01    130<L>  |  85<L>  |  28<H>  ----------------------------<  175<H>  4.3   |  24  |  1.40<H>    Ca    6.5<LL>      01 Jan 2019 21:33  Mg     1.7     01-01    TPro  7.7  /  Alb  3.7  /  TBili  4.2<H>  /  DBili  x   /  AST  43<H>  /  ALT  32  /  AlkPhos  337<H>  01-01    PT/INR - ( 01 Jan 2019 21:33 )   PT: 33.3 sec;   INR: 2.83 ratio         PTT - ( 01 Jan 2019 21:33 )  PTT:32.0 sec      Serum Pro-Brain Natriuretic Peptide: 2026 pg/mL (01-01 @ 21:33) Reason for Consultation: ADHF  Chief Complaint: SOB  Date of Admission: 1/2/19      HPI:  63 year old  woman with NICM (EF 10%, LVIDD 6.1cm, dx 2013) s/p CRT-D (6/2017), Mod-Severe MR, Severe TR, HTN, HLD, DM2, and Thyroid Cancer s/p resection c/b hypoparathyroidism with chronic hypocalcemia presents to ED with progressive SOB and weight gain despite up-titration of diuresis and milrinone as outpatient.    Recent admission from 11/6 to 11/23 for ADHF with course c/b cardiogenic shock. Discharged on home milrinone 0.375mcg/kg/min and discharge weight of 166 lbs. Seen in HF clinic on 12/5 where her weight was up 17lbs since discharge so Bumex was increased from 3mg BID to 6mg BID. Returned for follow-up 12/13 where her weight was up 4lbs despite augmentation of her diuretics. Following that visit her milrinone 0.500mcg/kg/min was increased and she received Lasix IV via home infusion and started on Losartan 25mg daily for afterload reduction. Returned follow-up 12/18 with additional weight gain of 10lbs.     HF consulted for further management. At time of evaluation, patient was HDS (afebrile, , /71, 98% RA). EKG with a-sensed, V-paced, . BNP elevated 2086 (last admission 2414). Troponin negative x 2. Endorses SOB. No chest pain or palpitations. Endorses significant abdominal distension and LE swelling. Sleeps with 1 pillow at night and denies PND or orthopnea. Denies fevers, chill, cough, or recent sick contacts. Denies nausea, vomiting, abdominal pain, urinary symptoms.     Past Medical History:   Asthma  CHF (congestive heart failure)  DM (diabetes mellitus)  HTN (hypertension)  Thyroid ca    Past Surgical History:   AICD (automatic cardioverter/defibrillator) present  S/P thyroidectomy    Medications:   acetaminophen   Tablet .. 650 milliGRAM(s) Oral every 6 hours PRN  aspirin enteric coated 81 milliGRAM(s) Oral daily  buMETAnide 3 milliGRAM(s) Oral two times a day  calcitriol   Capsule 0.5 MICROGram(s) Oral daily  calcium acetate 667 milliGRAM(s) Oral three times a day with meals  calcium carbonate 1250 mG  + Vitamin D (OsCal 500 + D) 1 Tablet(s) Oral three times a day  dextrose 40% Gel 15 Gram(s) Oral once PRN  dextrose 5%. 1000 milliLiter(s) IV Continuous <Continuous>  dextrose 50% Injectable 25 Gram(s) IV Push once  gabapentin 200 milliGRAM(s) Oral three times a day  glucagon  Injectable 1 milliGRAM(s) IntraMuscular once PRN  insulin lispro (HumaLOG) corrective regimen sliding scale   SubCutaneous three times a day before meals  insulin lispro (HumaLOG) corrective regimen sliding scale   SubCutaneous at bedtime  levothyroxine 175 MICROGram(s) Oral daily  magnesium oxide 400 milliGRAM(s) Oral once  magnesium oxide 400 milliGRAM(s) Oral two times a day with meals  milrinone Infusion 0.469 MICROgram(s)/kG/Min IV Continuous <Continuous>  pantoprazole    Tablet 40 milliGRAM(s) Oral before breakfast  potassium chloride    Tablet ER 20 milliEquivalent(s) Oral daily  rifaximin 550 milliGRAM(s) Oral two times a day  spironolactone 50 milliGRAM(s) Oral daily    Allergies:  Isordil (Headache)  penicillin (Rash)    FAMILY HISTORY:  No pertinent family history in first degree relatives    Social History:  Smoking History:  Alcohol Use:  Drug Use:    Review of Systems:  REVIEW OF SYSTEMS:  CONSTITUTIONAL: No weakness, fevers or chills  EYES/ENT: No visual changes;  No dysphagia  NECK: No pain or stiffness  RESPIRATORY: as per HPI  CARDIOVASCULAR: as per HPI  GASTROINTESTINAL: No abdominal or epigastric pain. No nausea, vomiting, or hematemesis; No diarrhea or constipation. No melena or hematochezia.  BACK: No back pain  GENITOURINARY: No dysuria, frequency or hematuria  NEUROLOGICAL: No numbness or weakness  SKIN: No itching, burning, rashes, or lesions   All other review of systems is negative unless indicated above.    Vitals:  T(F): 98.3 (01-01), Max: 98.3 (01-01)  HR: 102 (01-01) (101 - 109)  BP: 121/81 (01-01) (103/72 - 121/81)  RR: 20 (01-01)  SpO2: 100% (01-01)    Physical Exam:  GENERAL: No acute distress, well-developed  HEAD:  Atraumatic, Normocephalic  ENT: EOMI, PERRLA, conjunctiva and sclera clear, Neck supple, No JVD, moist mucosa  CHEST/LUNG: Clear to auscultation bilaterally; No wheeze, equal breath sounds bilaterally   BACK: No spinal tenderness  HEART: Regular rate and rhythm; No murmurs, rubs, or gallops  ABDOMEN: Soft, Nontender, Nondistended; Bowel sounds present  EXTREMITIES:  No clubbing, cyanosis, or edema  PSYCH: Nl behavior, nl affect  NEUROLOGY: AAOx3, non-focal, cranial nerves intact  SKIN: Normal color, No rashes or lesions    Cardiovascular Diagnostic Testing:    Interpretation of Tele:    ECG: Personally reviewed    Echo:  < from: TTE with Doppler (w/Cont) (11.07.18 @ 05:53) >  Conclusions:  1. Tethered mitral valve leaflets with normal opening.  Mitral annular calcification and thickened  mitral leaflet  tips Moderate mitral regurgitation.  2. Calcified trileaflet aortic valve with normal opening.  3. Moderately dilated left atrium.  LA volume index = 43  cc/m2.  4. Eccentric left ventricular hypertrophy (dilated left  ventricle with normal relative wall thickness).  5. Severe global left ventricularsystolic dysfunction.  Endocardial visualization enhanced with intravenous  injection of Ultrasonic Enhancing Agent (Definity). No LV  thrombus.  Septal flattening consistent with right  ventricular overload.  6. Severe diastolic dysfunction with elevated filling  pressures  7. Severe right atrial enlargement.  8. Right ventricular enlargement with decreased right  ventricular systolic function.  A device wire is noted in  the right heart.  9. Dilated tricuspid annulus with malcoaptation of  tricuspid leaflets. Severe tricuspid regurgitation.    Stress Testing:    Cath:  < from: Cardiac Cath Lab (01.08.14 @ 16:49) >  CORONARY VESSELS: The coronary circulation is co-dominant.  LM:   -- LM: Normal.  LAD:   --  LAD: Normal.  CX:   --  Circumflex: Normal.  RCA:   --  RCA: Normal.  COMPLICATIONS: There were no complications.    Imaging:    Labs: Personally reviewed                        9.9    6.7   )-----------( 290      ( 01 Jan 2019 21:33 )             30.9     01-01    130<L>  |  85<L>  |  28<H>  ----------------------------<  175<H>  4.3   |  24  |  1.40<H>    Ca    6.5<LL>      01 Jan 2019 21:33  Mg     1.7     01-01    TPro  7.7  /  Alb  3.7  /  TBili  4.2<H>  /  DBili  x   /  AST  43<H>  /  ALT  32  /  AlkPhos  337<H>  01-01    PT/INR - ( 01 Jan 2019 21:33 )   PT: 33.3 sec;   INR: 2.83 ratio         PTT - ( 01 Jan 2019 21:33 )  PTT:32.0 sec      Serum Pro-Brain Natriuretic Peptide: 2026 pg/mL (01-01 @ 21:33)

## 2019-01-02 NOTE — PROGRESS NOTE ADULT - ASSESSMENT
63F w/ PMHx of Hfref (mod-severe MR, EF 10% 11/2018) s/p ICD  s/p  PICC  on milrinone ,h/o  thyroid CA s/p resection c/b hypoparathyroidism, HTN, DMII, p/w worsening fluid retention      Problem/Plan - 1:  ·  Problem: Acute on Chronic Systolic CHF exacerbation.  Plan: on Milrinone infusion and starting Bumex drip .  - Cardiology consulted  - Heart failure consult noted.   - strict and outs   - daily weight   - bid electrolytes  - continue spironolacto   Problem/Plan - 2:  ·  Problem: DM (diabetes mellitus).  Plan: -Sugars in acceptable range.  -Hold oral hypoglycemics  -Start HISS, check fsg qac/qhs  -check a1c in am  -consistent carb diet.      Problem/Plan - 3:  ·  Problem: HTN (hypertension).  Plan: Well controlled.      Problem/Plan - 4:  ·  Problem: Hypothyroidism   Plan: - continue levothyroxine   - calcitriol.      Problem/Plan - 5:  ·  Problem: Hypocalcemia.  Plan: - Replacing.  -calcium acetate   - calcium + D.      Problem/Plan - 6:  Problem: Anemia. Plan:HH stable.   no active bleeding   - monitor CBC.     Problem/Plan - 7:  ·  Problem: Elevated liver function tests.  Plan: thought to be 2/2 congestive hepatopathy   - continue rifaximin.      Problem/Plan - 8:  ·  Problem: Diabetic neuropathy .  Plan: - Continue Gabapentin.      Problem/Plan - 9:  ·  Problem: Need for prophylactic measure.  Plan: - sub q heparin.

## 2019-01-02 NOTE — CHART NOTE - NSCHARTNOTEFT_GEN_A_CORE
Seen by CHF team and started pt on hydralazine 10mg [id number], metolazone 5mg daily and milrinone drip at 0.5mcg/kg/mt. Dr. Rojas concurs with the plan    [id number]

## 2019-01-02 NOTE — CHART NOTE - NSCHARTNOTEFT_GEN_A_CORE
Nephrology consult      The patient is a deidre 63 year_old female with past medical history of hypertension, diabetes, thyroid cancer, with presumed removal of parathyroid gland, congestive nonischemic cardiomyopathy, congestive heart failure, and pulmonary hypertension who presents with acute decompensated systolic heart failure.    The patient has hypervolemic hyponatremia likely secondary to heart failure.  She also has hypocalcemia likely secondary to the fact that she likely has no parathyroid gland.      Endocrine.  Once out of heart failure her synthroid dose may need adjustment    Renal.   Bumex  gtt when in a room	        Phosphorus-    Phoslo to 667 tid.             Sayed Yaneth  [phone].

## 2019-01-02 NOTE — CONSULT NOTE ADULT - ATTENDING COMMENTS
Pt is reportedly not a candidate for advanced HF therapies.  Start hydralazine 10 tid and metolazone 5 mg po qd.  Cont milrinone and Bumex gtts.

## 2019-01-02 NOTE — PROGRESS NOTE ADULT - SUBJECTIVE AND OBJECTIVE BOX
INTERVAL HPI/OVERNIGHT EVENTS: I was doing great but couldnt kept on gaining weight.   Vital Signs Last 24 Hrs  T(C): 36.8 (02 Jan 2019 07:36), Max: 36.8 (01 Jan 2019 21:41)  T(F): 98.2 (02 Jan 2019 07:36), Max: 98.3 (01 Jan 2019 21:41)  HR: 109 (02 Jan 2019 07:36) (76 - 110)  BP: 113/80 (02 Jan 2019 07:36) (103/72 - 121/81)  BP(mean): --  RR: 19 (02 Jan 2019 07:36) (19 - 22)  SpO2: 95% (02 Jan 2019 07:36) (94% - 100%)  I&O's Summary    MEDICATIONS  (STANDING):  aspirin enteric coated 81 milliGRAM(s) Oral daily  buMETAnide 3 milliGRAM(s) Oral two times a day  calcitriol   Capsule 0.5 MICROGram(s) Oral daily  calcium acetate 667 milliGRAM(s) Oral three times a day with meals  calcium carbonate 1250 mG  + Vitamin D (OsCal 500 + D) 1 Tablet(s) Oral three times a day  dextrose 5%. 1000 milliLiter(s) (50 mL/Hr) IV Continuous <Continuous>  dextrose 50% Injectable 25 Gram(s) IV Push once  gabapentin 200 milliGRAM(s) Oral three times a day  insulin lispro (HumaLOG) corrective regimen sliding scale   SubCutaneous three times a day before meals  insulin lispro (HumaLOG) corrective regimen sliding scale   SubCutaneous at bedtime  levothyroxine 175 MICROGram(s) Oral daily  magnesium oxide 400 milliGRAM(s) Oral two times a day with meals  milrinone Infusion 0.469 MICROgram(s)/kG/Min (13.6 mL/Hr) IV Continuous <Continuous>  pantoprazole    Tablet 40 milliGRAM(s) Oral before breakfast  potassium chloride    Tablet ER 20 milliEquivalent(s) Oral daily  rifaximin 550 milliGRAM(s) Oral two times a day  spironolactone 50 milliGRAM(s) Oral daily    MEDICATIONS  (PRN):  acetaminophen   Tablet .. 650 milliGRAM(s) Oral every 6 hours PRN Mild Pain (1 - 3)  dextrose 40% Gel 15 Gram(s) Oral once PRN Blood Glucose LESS THAN 70 milliGRAM(s)/deciliter  glucagon  Injectable 1 milliGRAM(s) IntraMuscular once PRN Glucose LESS THAN 70 milligrams/deciliter    LABS:                        10.5   6.6   )-----------( 278      ( 02 Jan 2019 11:27 )             31.1     01-02    130<L>  |  86<L>  |  27<H>  ----------------------------<  132<H>  4.0   |  23  |  1.25    Ca    6.6<L>      02 Jan 2019 11:21  Mg     1.8     01-02    TPro  7.8  /  Alb  3.8  /  TBili  4.6<H>  /  DBili  x   /  AST  43<H>  /  ALT  32  /  AlkPhos  326<H>  01-02    PT/INR - ( 01 Jan 2019 21:33 )   PT: 33.3 sec;   INR: 2.83 ratio         PTT - ( 01 Jan 2019 21:33 )  PTT:32.0 sec    CAPILLARY BLOOD GLUCOSE      POCT Blood Glucose.: 181 mg/dL (02 Jan 2019 12:26)  POCT Blood Glucose.: 199 mg/dL (02 Jan 2019 09:46)          REVIEW OF SYSTEMS:  CONSTITUTIONAL: No fever, weight loss, or fatigue  EYES: jaundice  ENMT:  No difficulty hearing, tinnitus, vertigo; No sinus or throat pain  NECK: No pain or stiffness  RESPIRATORY: No cough, wheezing, chills or hemoptysis; No shortness of breath  CARDIOVASCULAR: No chest pain, palpitations, dizziness, or leg swelling  GASTROINTESTINAL: No abdominal or epigastric pain. No nausea, vomiting, or hematemesis; No diarrhea or constipation. No melena or hematochezia.  GENITOURINARY: No dysuria, frequency, hematuria, or incontinence  NEUROLOGICAL: No headaches, memory loss, loss of strength, numbness, or tremors      Consultant(s) Notes Reviewed:  [x ] YES  [ ] NO    PHYSICAL EXAM:  GENERAL: NAD, well-groomed, well-developed, not in any distress ,  HEAD:  Atraumatic, Normocephalic  EYES: EOMI, PERRLA, conjunctiva and jaundice   ENMT: No tonsillar erythema, exudates, or enlargement; Moist mucous membranes, Good dentition, No lesions  NECK: JVD++++  NERVOUS SYSTEM:  Alert & Oriented X3, No focal deficit   CHEST/LUNG: Good air entry bilateral with no  rales, rhonchi, wheezing, or rubs  HEART: Regular rate and rhythm; No murmurs, rubs, or gallops  ABDOMEN: Soft, Nontender, distended; Bowel sounds present  EXTREMITIES:  2+ Peripheral Pulses, No clubbing, cyanosis, but + edema      Care Discussed with Consultants/Other Providers [ x] YES  [ ] NO

## 2019-01-02 NOTE — CONSULT NOTE ADULT - SUBJECTIVE AND OBJECTIVE BOX
HISTORY OF PRESENT ILLNESS: HPI:  64 y/o post-menopausal F w/ PMHx of Hfref (mod-severe MR, EF 10% 11/2018) s/p ICD  s/p  PICC line for milrinone ,   h/o  thyroid CA s/p resection c/b hypoparathyroidism, HTN, DMII, recent admission for vaginal bleeding  and HF exacerbation , presents for SOB x 1 week , Patient feels dyspneic while walking few feet, clear productive cough, PND, peripheral edema but denied orthopnea. Patient also reported severe abdominal distension causing patient to have intermittent chest pressure. Patient stated she has had 40 pound unintentional weight gain since she was discharged 11/2018. She also reports abdominal cramping. Patient denied fever, chills, abdominal pain, N/V/D, headache, dizziness. Patient reports compliance with diet and medical regimen   No family history of heart disease (01 Jan 2019 23:29)      PAST MEDICAL & SURGICAL HISTORY:  Asthma  CHF (congestive heart failure): with EF of 10% s/p AICD  DM (diabetes mellitus)  HTN (hypertension)  Thyroid ca  AICD (automatic cardioverter/defibrillator) present  S/P thyroidectomy          MEDICATIONS:  MEDICATIONS  (STANDING):  aspirin enteric coated 81 milliGRAM(s) Oral daily  buMETAnide Infusion 1 mG/Hr (5 mL/Hr) IV Continuous <Continuous>  calcitriol   Capsule 0.5 MICROGram(s) Oral daily  calcium acetate 667 milliGRAM(s) Oral three times a day with meals  calcium carbonate 1250 mG  + Vitamin D (OsCal 500 + D) 1 Tablet(s) Oral three times a day  dextrose 5%. 1000 milliLiter(s) (50 mL/Hr) IV Continuous <Continuous>  dextrose 50% Injectable 25 Gram(s) IV Push once  gabapentin 200 milliGRAM(s) Oral three times a day  insulin lispro (HumaLOG) corrective regimen sliding scale   SubCutaneous three times a day before meals  insulin lispro (HumaLOG) corrective regimen sliding scale   SubCutaneous at bedtime  levothyroxine 175 MICROGram(s) Oral daily  magnesium oxide 400 milliGRAM(s) Oral two times a day with meals  milrinone Infusion 0.469 MICROgram(s)/kG/Min (13.6 mL/Hr) IV Continuous <Continuous>  pantoprazole    Tablet 40 milliGRAM(s) Oral before breakfast  potassium chloride    Tablet ER 20 milliEquivalent(s) Oral daily  rifaximin 550 milliGRAM(s) Oral two times a day  spironolactone 50 milliGRAM(s) Oral daily      Allergies    Isordil (Headache)  penicillin (Rash)    Intolerances        FAMILY HISTORY:  No pertinent family history in first degree relatives    Non-contributary for premature coronary disease or sudden cardiac death    SOCIAL HISTORY:    [ X] Non-smoker  [ ] Smoker  [ ] Alcohol      REVIEW OF SYSTEMS:  [ ]chest pain  [  ]shortness of breath  [  ]palpitations  [  ]syncope  [ ]near syncope [ ]upper extremity weakness   [ ] lower extremity weakness  [  ]diplopia  [  ]altered mental status   [  ]fevers  [ ]chills [ ]nausea  [ ]vomitting  [  ]dysphagia    [ ]abdominal pain  [ ]melena  [ ]BRBPR    [  ]epistaxis  [  ]rash    [ ]lower extremity edema        [X ] All others negative	  [ ] Unable to obtain      LABS:	 	    CARDIAC MARKERS:                              10.5   6.6   )-----------( 278      ( 02 Jan 2019 11:27 )             31.1     Hb Trend: 10.5<--, 8.9<--, 9.9<--    01-02    130<L>  |  86<L>  |  27<H>  ----------------------------<  132<H>  4.0   |  23  |  1.25    Ca    6.6<L>      02 Jan 2019 11:21  Mg     1.8     01-02    TPro  7.8  /  Alb  3.8  /  TBili  4.6<H>  /  DBili  x   /  AST  43<H>  /  ALT  32  /  AlkPhos  326<H>  01-02    Creatinine Trend: 1.25<--, 1.18<--, 1.40<--    Coags:  PT/INR - ( 01 Jan 2019 21:33 )   PT: 33.3 sec;   INR: 2.83 ratio         PTT - ( 01 Jan 2019 21:33 )  PTT:32.0 sec    proBNP: Serum Pro-Brain Natriuretic Peptide: 2026 pg/mL (01-01 @ 21:33)            PHYSICAL EXAM:  T(C): 36.8 (01-02-19 @ 07:36), Max: 36.8 (01-01-19 @ 21:41)  HR: 109 (01-02-19 @ 07:36) (76 - 110)  BP: 113/80 (01-02-19 @ 07:36) (103/72 - 121/81)  RR: 19 (01-02-19 @ 07:36) (19 - 22)  SpO2: 95% (01-02-19 @ 07:36) (94% - 100%)  Wt(kg): --  I&O's Summary      A  HEENT:  (-)icterus (-)pallor  CV: N S1 S2 1/6 ROSALINDA (+)2 Pulses B/l  Resp:  Clear to ausculatation B/L, normal effort  GI: (+) BS Soft, NT, ND  Lymph:  (+)Edema, (-)obvious lymphadenopathy  Skin: Warm to touch, Normal turgor  Psych: Appropriate mood and affect        ECG:    A sense V paced	    RADIOLOGY:         CXR:  No infiltartes     ASSESSMENT/PLAN: 	63y Female  F with PMHx of NICM s/p AICD, HTN, moderate-severe MR, reportedly recent LHC at [place] was negative, thyroid cancer s/p thyroidectomy, DM,  admitted with decompensated acute on chronic systolic heart failure.    - Massively fluid overloaded, cont milrinone  - Would start Bumex gtt  - Maintain strict I+O's  - will follow with you

## 2019-01-02 NOTE — CONSULT NOTE ADULT - ASSESSMENT
ADHF  -keep K > 4 and Mg >2  -strict I/O, daily standing weights, fluid restrict 1.5L  -obtain routine Echo  -c/w home Milrinone 62F with NICM (LVEF 20%, LVIDd 6.5 cm), initially identified in 2013, s/p CRT-D (6/2017), Mod-Severe MR, Severe TR, HTN, HLD, DM II who presented to the ED    ADHF  -keep K > 4 and Mg >2  -strict I/O, daily standing weights, fluid restrict 1.5L  -obtain routine Echo  -c/w home Milrinone 63 year old  woman with NICM (EF 10%, LVIDD 6.1cm, dx 2013) s/p CRT-D (6/2017), Mod-Severe MR, Severe TR, HTN, HLD, DM2, and Thyroid Cancer s/p resection c/b hypoparathyroidism with chronic hypocalcemia presents with SOB and weight gain 2/2 acute on chronic heart failure exacerbation.    ADHF  -volume overloaded, no O2 requirements, extremities warm/well perfused  -continue home Milrinone  -Bumex 4mg IVP, then Bumex gtt @ 2  -BMP and Mg levels q12h  -keep K > 4 and Mg >2  -strict I/O, daily standing weights, fluid restrict 1.5L  -obtain routine Echo    Ross Cui M.D.  Cardiology Fellow 63 year old  woman with NICM (EF 10%, LVIDD 6.1cm, dx 2013) s/p CRT-D (6/2017), Mod-Severe MR, Severe TR, HTN, HLD, DM2, and Thyroid Cancer s/p resection c/b hypoparathyroidism with chronic hypocalcemia presents with SOB and weight gain 2/2 acute on chronic heart failure exacerbation.    Impression:  1. AHDF  2. Hypervolumic Hyponatremia  3. MAGNUS  -volume overloaded, no O2 requirements, extremities warm/well perfused  -cardiorenal in setting of ADHF    Recommendations  -continue home Milrinone  -Bumex 4mg IVP, then Bumex gtt @ 2  -BMP and Mg levels q12h  -keep K > 4 and Mg >2  -strict I/O, daily standing weights, fluid restrict 1.5L  -obtain routine Echo    Ross Cui M.D.  Cardiology Fellow 63 year old  woman with NICM (EF 10%, LVIDD 6.1cm, dx 2013) s/p CRT-D (6/2017), Mod-Severe MR, Severe TR, HTN, HLD, DM2, and Thyroid Cancer s/p resection c/b hypoparathyroidism with chronic hypocalcemia presents with SOB and weight gain 2/2 acute on chronic heart failure exacerbation.    Impression:  1. AHDF  2. Hypervolumic Hyponatremia  3. MAGNUS  -volume overloaded, no O2 requirements, extremities warm/well perfused  -cardiorenal in setting of ADHF    Recommendations  -continue home Milrinone  -s/p Lasix 80mg IV in ED, would start Bumex gtt @ 1   -BMP and Mg levels q12h  -keep K > 4 and Mg >2  -strict I/O, daily standing weights, fluid restrict 1.5L  -obtain routine Echo    Ross Cui M.D.  Cardiology Fellow 63 year old  woman with NICM (EF 10%, LVIDD 6.1cm, dx 2013) s/p CRT-D (6/2017), Mod-Severe MR, Severe TR, HTN, HLD, DM2, and Thyroid Cancer s/p resection c/b hypoparathyroidism with chronic hypocalcemia presents with SOB and weight gain 2/2 acute on chronic heart failure exacerbation.    Impression:  1. AHDF:   2. Hypervolumic Hyponatremia  3. MAGNUS  4. Hypocalcemia  -significant volume overloaded, HDS, no O2 requirements, extremities warm/well perfused  -cardiorenal in setting of ADHF  -dry weight 166lbs    Recommendations  -continue home Milrinone  -s/p Lasix 80mg IV in ED, would start Bumex gtt @ 1   -continue Spironolactone 50mg daily  -replete Calcium  -BMP and Mg levels q12h  -keep K > 4 and Mg >2  -strict I/O, daily standing weights, fluid restrict 1.5L  -obtain routine Echo    Ross Cui M.D.  Cardiology Fellow 63 year old  woman with NICM (EF 10%, LVIDD 6.1cm, dx 2013) s/p CRT-D (6/2017), Mod-Severe MR, Severe TR, HTN, HLD, DM2, and Thyroid Cancer s/p resection c/b hypoparathyroidism with chronic hypocalcemia presents with SOB and weight gain 2/2 acute on chronic heart failure exacerbation.    Impression:  1. AHDF:   2. Hypervolumic Hyponatremia  3. MAGNUS  4. Hypocalcemia  -significant volume overloaded, HDS, no O2 requirements, extremities warm/well perfused  -cardiorenal in setting of ADHF  -dry weight 166lbs    Recommendations  -continue home Milrinone  -s/p Lasix 80mg IV in ED, would start Bumex gtt @ 1   -continue Spironolactone 50mg daily  -replete Calcium  -BMP and Mg levels q12h  -keep K > 4 and Mg >2  -strict I/O, daily standing weights, fluid restrict 1.5L  -obtain routine Echo  -low suspicion for infection, but please obtain RVP, pro-calcitonin, UA to rule out precipitating/contributiing etiologies    Ross Cui M.D.  Cardiology Fellow

## 2019-01-03 DIAGNOSIS — I50.23 ACUTE ON CHRONIC SYSTOLIC (CONGESTIVE) HEART FAILURE: ICD-10-CM

## 2019-01-03 DIAGNOSIS — E87.1 HYPO-OSMOLALITY AND HYPONATREMIA: ICD-10-CM

## 2019-01-03 LAB
ALBUMIN SERPL ELPH-MCNC: 3.7 G/DL — SIGNIFICANT CHANGE UP (ref 3.3–5)
ALP SERPL-CCNC: 327 U/L — HIGH (ref 40–120)
ALT FLD-CCNC: 31 U/L — SIGNIFICANT CHANGE UP (ref 10–45)
ANION GAP SERPL CALC-SCNC: 19 MMOL/L — HIGH (ref 5–17)
ANION GAP SERPL CALC-SCNC: 21 MMOL/L — HIGH (ref 5–17)
AST SERPL-CCNC: 40 U/L — SIGNIFICANT CHANGE UP (ref 10–40)
BILIRUB DIRECT SERPL-MCNC: 2.6 MG/DL — HIGH (ref 0–0.2)
BILIRUB INDIRECT FLD-MCNC: 1.4 MG/DL — HIGH (ref 0.2–1)
BILIRUB SERPL-MCNC: 4 MG/DL — HIGH (ref 0.2–1.2)
BUN SERPL-MCNC: 25 MG/DL — HIGH (ref 7–23)
BUN SERPL-MCNC: 26 MG/DL — HIGH (ref 7–23)
CA-I BLD-SCNC: 0.79 MMOL/L — LOW (ref 1.12–1.3)
CALCIUM SERPL-MCNC: 6.6 MG/DL — LOW (ref 8.4–10.5)
CALCIUM SERPL-MCNC: 7.3 MG/DL — LOW (ref 8.4–10.5)
CHLORIDE SERPL-SCNC: 84 MMOL/L — LOW (ref 96–108)
CHLORIDE SERPL-SCNC: 88 MMOL/L — LOW (ref 96–108)
CO2 SERPL-SCNC: 23 MMOL/L — SIGNIFICANT CHANGE UP (ref 22–31)
CO2 SERPL-SCNC: 23 MMOL/L — SIGNIFICANT CHANGE UP (ref 22–31)
CREAT SERPL-MCNC: 1.08 MG/DL — SIGNIFICANT CHANGE UP (ref 0.5–1.3)
CREAT SERPL-MCNC: 1.13 MG/DL — SIGNIFICANT CHANGE UP (ref 0.5–1.3)
GLUCOSE BLDC GLUCOMTR-MCNC: 144 MG/DL — HIGH (ref 70–99)
GLUCOSE BLDC GLUCOMTR-MCNC: 156 MG/DL — HIGH (ref 70–99)
GLUCOSE BLDC GLUCOMTR-MCNC: 173 MG/DL — HIGH (ref 70–99)
GLUCOSE BLDC GLUCOMTR-MCNC: 185 MG/DL — HIGH (ref 70–99)
GLUCOSE SERPL-MCNC: 129 MG/DL — HIGH (ref 70–99)
GLUCOSE SERPL-MCNC: 234 MG/DL — HIGH (ref 70–99)
HCT VFR BLD CALC: 29.3 % — LOW (ref 34.5–45)
HGB BLD-MCNC: 9.6 G/DL — LOW (ref 11.5–15.5)
MAGNESIUM SERPL-MCNC: 1.8 MG/DL — SIGNIFICANT CHANGE UP (ref 1.6–2.6)
MAGNESIUM SERPL-MCNC: 1.9 MG/DL — SIGNIFICANT CHANGE UP (ref 1.6–2.6)
MCHC RBC-ENTMCNC: 22.3 PG — LOW (ref 27–34)
MCHC RBC-ENTMCNC: 32.8 GM/DL — SIGNIFICANT CHANGE UP (ref 32–36)
MCV RBC AUTO: 68.1 FL — LOW (ref 80–100)
PLATELET # BLD AUTO: 283 K/UL — SIGNIFICANT CHANGE UP (ref 150–400)
POTASSIUM SERPL-MCNC: 3.9 MMOL/L — SIGNIFICANT CHANGE UP (ref 3.5–5.3)
POTASSIUM SERPL-MCNC: 4.1 MMOL/L — SIGNIFICANT CHANGE UP (ref 3.5–5.3)
POTASSIUM SERPL-SCNC: 3.9 MMOL/L — SIGNIFICANT CHANGE UP (ref 3.5–5.3)
POTASSIUM SERPL-SCNC: 4.1 MMOL/L — SIGNIFICANT CHANGE UP (ref 3.5–5.3)
PROT SERPL-MCNC: 7.9 G/DL — SIGNIFICANT CHANGE UP (ref 6–8.3)
RBC # BLD: 4.3 M/UL — SIGNIFICANT CHANGE UP (ref 3.8–5.2)
RBC # FLD: 22.5 % — HIGH (ref 10.3–14.5)
SODIUM SERPL-SCNC: 128 MMOL/L — LOW (ref 135–145)
SODIUM SERPL-SCNC: 130 MMOL/L — LOW (ref 135–145)
WBC # BLD: 7.59 K/UL — SIGNIFICANT CHANGE UP (ref 3.8–10.5)
WBC # FLD AUTO: 7.59 K/UL — SIGNIFICANT CHANGE UP (ref 3.8–10.5)

## 2019-01-03 PROCEDURE — 99233 SBSQ HOSP IP/OBS HIGH 50: CPT

## 2019-01-03 RX ORDER — POTASSIUM CHLORIDE 20 MEQ
40 PACKET (EA) ORAL ONCE
Qty: 0 | Refills: 0 | Status: COMPLETED | OUTPATIENT
Start: 2019-01-03 | End: 2019-01-03

## 2019-01-03 RX ORDER — CHLORHEXIDINE GLUCONATE 213 G/1000ML
1 SOLUTION TOPICAL
Qty: 0 | Refills: 0 | Status: DISCONTINUED | OUTPATIENT
Start: 2019-01-03 | End: 2019-01-15

## 2019-01-03 RX ORDER — CALCIUM GLUCONATE 100 MG/ML
2 VIAL (ML) INTRAVENOUS ONCE
Qty: 0 | Refills: 0 | Status: COMPLETED | OUTPATIENT
Start: 2019-01-03 | End: 2019-01-03

## 2019-01-03 RX ADMIN — Medication 20 MILLIEQUIVALENT(S): at 12:39

## 2019-01-03 RX ADMIN — GABAPENTIN 200 MILLIGRAM(S): 400 CAPSULE ORAL at 21:06

## 2019-01-03 RX ADMIN — MAGNESIUM OXIDE 400 MG ORAL TABLET 400 MILLIGRAM(S): 241.3 TABLET ORAL at 17:17

## 2019-01-03 RX ADMIN — Medication 1: at 17:16

## 2019-01-03 RX ADMIN — Medication 667 MILLIGRAM(S): at 17:17

## 2019-01-03 RX ADMIN — Medication 100 GRAM(S): at 00:06

## 2019-01-03 RX ADMIN — Medication 40 MILLIEQUIVALENT(S): at 17:17

## 2019-01-03 RX ADMIN — PANTOPRAZOLE SODIUM 40 MILLIGRAM(S): 20 TABLET, DELAYED RELEASE ORAL at 08:32

## 2019-01-03 RX ADMIN — Medication 667 MILLIGRAM(S): at 12:40

## 2019-01-03 RX ADMIN — Medication 81 MILLIGRAM(S): at 12:40

## 2019-01-03 RX ADMIN — MILRINONE LACTATE 14.76 MICROGRAM(S)/KG/MIN: 1 INJECTION, SOLUTION INTRAVENOUS at 01:06

## 2019-01-03 RX ADMIN — Medication 200 MILLIGRAM(S): at 00:03

## 2019-01-03 RX ADMIN — GABAPENTIN 200 MILLIGRAM(S): 400 CAPSULE ORAL at 06:04

## 2019-01-03 RX ADMIN — Medication 10 MILLIGRAM(S): at 21:06

## 2019-01-03 RX ADMIN — CHLORHEXIDINE GLUCONATE 1 APPLICATION(S): 213 SOLUTION TOPICAL at 12:37

## 2019-01-03 RX ADMIN — Medication 1 TABLET(S): at 08:33

## 2019-01-03 RX ADMIN — MAGNESIUM OXIDE 400 MG ORAL TABLET 400 MILLIGRAM(S): 241.3 TABLET ORAL at 08:32

## 2019-01-03 RX ADMIN — Medication 200 MILLIGRAM(S): at 21:07

## 2019-01-03 RX ADMIN — Medication 200 GRAM(S): at 17:18

## 2019-01-03 RX ADMIN — MILRINONE LACTATE 14.76 MICROGRAM(S)/KG/MIN: 1 INJECTION, SOLUTION INTRAVENOUS at 17:18

## 2019-01-03 RX ADMIN — Medication 20 MILLIEQUIVALENT(S): at 00:04

## 2019-01-03 RX ADMIN — Medication 1: at 12:21

## 2019-01-03 RX ADMIN — Medication 175 MICROGRAM(S): at 06:02

## 2019-01-03 RX ADMIN — SPIRONOLACTONE 50 MILLIGRAM(S): 25 TABLET, FILM COATED ORAL at 08:32

## 2019-01-03 RX ADMIN — Medication 10 MILLIGRAM(S): at 08:34

## 2019-01-03 RX ADMIN — CALCITRIOL 0.5 MICROGRAM(S): 0.5 CAPSULE ORAL at 12:39

## 2019-01-03 RX ADMIN — BUMETANIDE 5 MG/HR: 0.25 INJECTION INTRAMUSCULAR; INTRAVENOUS at 17:19

## 2019-01-03 RX ADMIN — Medication 667 MILLIGRAM(S): at 08:32

## 2019-01-03 RX ADMIN — Medication 1 TABLET(S): at 13:15

## 2019-01-03 RX ADMIN — GABAPENTIN 200 MILLIGRAM(S): 400 CAPSULE ORAL at 13:15

## 2019-01-03 RX ADMIN — Medication 10 MILLIGRAM(S): at 13:15

## 2019-01-03 RX ADMIN — Medication 1 TABLET(S): at 21:06

## 2019-01-03 NOTE — PROGRESS NOTE ADULT - PROBLEM SELECTOR PLAN 1
Continue current dose of milrinone 0.5mcg/kg/min  Continue current dose of bumex gtt 1mg/hr  Continue metolazone 5mg daily  Continue hydralazine 10mg TID, hold for SBP < 90  Continue spironolactone 50mg daily  BID BMP, Mg with aggressive diuresis  Goal K 4-4.5, Mg 2-2.5  Daily standing weights  Strict I/Os, fluid restriction of 1L/day

## 2019-01-03 NOTE — PROGRESS NOTE ADULT - ATTENDING COMMENTS
Agree with above assessment and plan as outlined above.    - cont inotrope assisted diuresis for acute decompensated systolic heart failure  - Remains massively volume overloaded    Augusto Grimes MD, FACC

## 2019-01-03 NOTE — PROGRESS NOTE ADULT - SUBJECTIVE AND OBJECTIVE BOX
Subjective:  pt seen and examined, no complaints, ROS - .    acetaminophen   Tablet .. 650 milliGRAM(s) Oral every 6 hours PRN  aspirin enteric coated 81 milliGRAM(s) Oral daily  buMETAnide Infusion 1 mG/Hr IV Continuous <Continuous>  calcitriol   Capsule 0.5 MICROGram(s) Oral daily  calcium acetate 667 milliGRAM(s) Oral three times a day with meals  calcium carbonate 1250 mG  + Vitamin D (OsCal 500 + D) 1 Tablet(s) Oral three times a day  dextrose 40% Gel 15 Gram(s) Oral once PRN  dextrose 5%. 1000 milliLiter(s) IV Continuous <Continuous>  dextrose 50% Injectable 25 Gram(s) IV Push once  gabapentin 200 milliGRAM(s) Oral three times a day  glucagon  Injectable 1 milliGRAM(s) IntraMuscular once PRN  guaiFENesin   Syrup  (Sugar-Free) 200 milliGRAM(s) Oral every 6 hours PRN  hydrALAZINE 10 milliGRAM(s) Oral every 8 hours  insulin lispro (HumaLOG) corrective regimen sliding scale   SubCutaneous three times a day before meals  insulin lispro (HumaLOG) corrective regimen sliding scale   SubCutaneous at bedtime  levothyroxine 175 MICROGram(s) Oral daily  magnesium oxide 400 milliGRAM(s) Oral two times a day with meals  metolazone 5 milliGRAM(s) Oral daily  milrinone Infusion 0.5 MICROgram(s)/kG/Min IV Continuous <Continuous>  pantoprazole    Tablet 40 milliGRAM(s) Oral before breakfast  potassium chloride    Tablet ER 20 milliEquivalent(s) Oral daily  rifaximin 550 milliGRAM(s) Oral two times a day  spironolactone 50 milliGRAM(s) Oral daily                            10.5   6.6   )-----------( 278      ( 02 Jan 2019 11:27 )             31.1       Hemoglobin: 10.5 g/dL (01-02 @ 11:27)  Hemoglobin: 8.9 g/dL (01-02 @ 08:11)  Hemoglobin: 9.9 g/dL (01-01 @ 21:33)      01-02    127<L>  |  89<L>  |  29<H>  ----------------------------<  207<H>  3.8   |  23  |  1.24    Ca    6.5<LL>      02 Jan 2019 21:06  Mg     1.8     01-02    TPro  7.8  /  Alb  3.8  /  TBili  4.6<H>  /  DBili  x   /  AST  43<H>  /  ALT  32  /  AlkPhos  326<H>  01-02    Creatinine Trend: 1.24<--, 1.25<--, 1.18<--, 1.40<--    COAGS:           T(C): 37 (01-03-19 @ 04:31), Max: 37 (01-03-19 @ 04:31)  HR: 108 (01-03-19 @ 04:31) (103 - 117)  BP: 119/83 (01-03-19 @ 04:31) (100/69 - 119/83)  RR: 19 (01-03-19 @ 04:31) (18 - 19)  SpO2: 97% (01-03-19 @ 04:31) (95% - 100%)  Wt(kg): --    I&O's Summary    02 Jan 2019 07:01  -  03 Jan 2019 05:57  --------------------------------------------------------  IN: 360 mL / OUT: 150 mL / NET: 210 mL      HEENT:  (-)icterus (-)pallor  CV: N S1 S2 1/6 ROSALINDA (+)2 Pulses B/l  Resp:  Clear to ausculatation B/L, normal effort  GI: (+) BS Soft, NT, ND  Lymph:  (+)Edema, (-)obvious lymphadenopathy  Skin: Warm to touch, Normal turgor  Psych: Appropriate mood and affect        ECG:    A sense V paced	    RADIOLOGY:         CXR:  No infiltartes     ASSESSMENT/PLAN: 	63y Female  F with PMHx of NICM s/p AICD, HTN, moderate-severe MR, reportedly recent LHC at [place] was negative, thyroid cancer s/p thyroidectomy, DM,  admitted with decompensated acute on chronic systolic heart failure.    - ASA,  statin   - heart failure follow up , cont zaroxlyn , hydralizine , aldactone   - pt tolerating Primacor Gtt at present ,   *- would check daily standing weight   - Massively fluid overloaded  - renal follow up   -  GI / DVT prophylaxis,  keep K>4, mag >2.0   - Would start Bumex gtt  - Maintain strict I+O's  D/W Dr Grimes

## 2019-01-03 NOTE — PROGRESS NOTE ADULT - ASSESSMENT
A 63-year-old female with past medical history of hypertension, diabetes, chronic kidney disease stage III, presents with grossly volume overloaded state.  She has hypervolemic, hyponatremia and will need aggressive diuresis.  1.	Cardiology.  The patient will be on a Bumex drip  and good urine output  2.	Renal.  Simply trend her serum sodium; no need for Samsca at present.  Avoid excessive amounts of liquids without calories.  Encourage protein intake.  Additional KDUR 40meq po x 1  3.	Endo.  Continue with Synthroid.  Once she is out of heart failure, the dose of Synthroid may need to be adjusted.  Unclear how much medication she is absorbing as she likely has got edema as well.

## 2019-01-03 NOTE — PROGRESS NOTE ADULT - SUBJECTIVE AND OBJECTIVE BOX
INTERVAL HPI/OVERNIGHT EVENTS: I feel weak ,tired and sleepy.   Vital Signs Last 24 Hrs  T(C): 36.5 (03 Jan 2019 11:30), Max: 37 (03 Jan 2019 04:31)  T(F): 97.7 (03 Jan 2019 11:30), Max: 98.6 (03 Jan 2019 04:31)  HR: 105 (03 Jan 2019 11:30) (103 - 117)  BP: 125/82 (03 Jan 2019 11:30) (100/69 - 125/82)  BP(mean): --  RR: 17 (03 Jan 2019 11:30) (17 - 19)  SpO2: 99% (03 Jan 2019 11:30) (97% - 100%)  I&O's Summary    02 Jan 2019 07:01  -  03 Jan 2019 07:00  --------------------------------------------------------  IN: 596 mL / OUT: 150 mL / NET: 446 mL    03 Jan 2019 07:01  -  03 Jan 2019 12:26  --------------------------------------------------------  IN: 0 mL / OUT: 1300 mL / NET: -1300 mL      MEDICATIONS  (STANDING):  aspirin enteric coated 81 milliGRAM(s) Oral daily  buMETAnide Infusion 1 mG/Hr (5 mL/Hr) IV Continuous <Continuous>  calcitriol   Capsule 0.5 MICROGram(s) Oral daily  calcium acetate 667 milliGRAM(s) Oral three times a day with meals  calcium carbonate 1250 mG  + Vitamin D (OsCal 500 + D) 1 Tablet(s) Oral three times a day  chlorhexidine 4% Liquid 1 Application(s) Topical <User Schedule>  dextrose 5%. 1000 milliLiter(s) (50 mL/Hr) IV Continuous <Continuous>  dextrose 50% Injectable 25 Gram(s) IV Push once  gabapentin 200 milliGRAM(s) Oral three times a day  hydrALAZINE 10 milliGRAM(s) Oral every 8 hours  insulin lispro (HumaLOG) corrective regimen sliding scale   SubCutaneous three times a day before meals  insulin lispro (HumaLOG) corrective regimen sliding scale   SubCutaneous at bedtime  levothyroxine 175 MICROGram(s) Oral daily  magnesium oxide 400 milliGRAM(s) Oral two times a day with meals  metolazone 5 milliGRAM(s) Oral daily  milrinone Infusion 0.5 MICROgram(s)/kG/Min (14.76 mL/Hr) IV Continuous <Continuous>  pantoprazole    Tablet 40 milliGRAM(s) Oral before breakfast  potassium chloride    Tablet ER 20 milliEquivalent(s) Oral daily  rifaximin 550 milliGRAM(s) Oral two times a day  spironolactone 50 milliGRAM(s) Oral daily    MEDICATIONS  (PRN):  acetaminophen   Tablet .. 650 milliGRAM(s) Oral every 6 hours PRN Mild Pain (1 - 3)  dextrose 40% Gel 15 Gram(s) Oral once PRN Blood Glucose LESS THAN 70 milliGRAM(s)/deciliter  glucagon  Injectable 1 milliGRAM(s) IntraMuscular once PRN Glucose LESS THAN 70 milligrams/deciliter  guaiFENesin   Syrup  (Sugar-Free) 200 milliGRAM(s) Oral every 6 hours PRN Cough    LABS:                        9.6    7.59  )-----------( 283      ( 03 Jan 2019 07:33 )             29.3     01-02    127<L>  |  89<L>  |  29<H>  ----------------------------<  207<H>  3.8   |  23  |  1.24    Ca    6.5<LL>      02 Jan 2019 21:06  Mg     1.9     01-03    TPro  7.9  /  Alb  3.7  /  TBili  4.0<H>  /  DBili  2.6<H>  /  AST  40  /  ALT  31  /  AlkPhos  327<H>  01-03    PT/INR - ( 01 Jan 2019 21:33 )   PT: 33.3 sec;   INR: 2.83 ratio         PTT - ( 01 Jan 2019 21:33 )  PTT:32.0 sec    CAPILLARY BLOOD GLUCOSE      POCT Blood Glucose.: 185 mg/dL (03 Jan 2019 11:33)  POCT Blood Glucose.: 144 mg/dL (03 Jan 2019 07:59)  POCT Blood Glucose.: 161 mg/dL (02 Jan 2019 21:29)  POCT Blood Glucose.: 162 mg/dL (02 Jan 2019 17:57)          REVIEW OF SYSTEMS:  CONSTITUTIONAL: No fever, weight loss, or fatigue  EYES: No eye pain, visual disturbances, or discharge  ENMT:  No difficulty hearing, tinnitus, vertigo; No sinus or throat pain  NECK: No pain or stiffness  BREASTS: No pain, masses, or nipple discharge  RESPIRATORY: No cough, wheezing, chills or hemoptysis; No shortness of breath  CARDIOVASCULAR: No chest pain, palpitations, dizziness, or leg swelling  GASTROINTESTINAL: No abdominal or epigastric pain. No nausea, vomiting, or hematemesis; No diarrhea or constipation. No melena or hematochezia.  GENITOURINARY: No dysuria, frequency, hematuria, or incontinence  NEUROLOGICAL: No headaches, memory loss, loss of strength, numbness, or tremors  SKIN: No itching, burning, rashes, or lesions   LYMPH NODES: No enlarged glands  ENDOCRINE: No heat or cold intolerance; No hair loss  MUSCULOSKELETAL: No joint pain or swelling; No muscle, back, or extremity pain  PSYCHIATRIC: No depression, anxiety, mood swings, or difficulty sleeping  HEME/LYMPH: No easy bruising, or bleeding gums  ALLERY AND IMMUNOLOGIC: No hives or eczema    RADIOLOGY & ADDITIONAL TESTS:    Consultant(s) Notes Reviewed:  [x ] YES  [ ] NO    PHYSICAL EXAM:  GENERAL: NAD, well-groomed, well-developed,not in any distress ,  HEAD:  Atraumatic, Normocephalic  EYES: EOMI, PERRLA, conjunctiva and sclera clear  ENMT: No tonsillar erythema, exudates, or enlargement; Moist mucous membranes, Good dentition, No lesions  NECK: Supple, No JVD, Normal thyroid  NERVOUS SYSTEM:  Alert & Oriented X3, No focal deficit   CHEST/LUNG: Good air entry bilateral with no  rales, rhonchi, wheezing, or rubs  HEART: Regular rate and rhythm; No murmurs, rubs, or gallops  ABDOMEN: Soft, Nontender, Nondistended; Bowel sounds present  EXTREMITIES:  2+ Peripheral Pulses, No clubbing, cyanosis, or edema  SKIN: No rashes or lesions    Care Discussed with Consultants/Other Providers [ x] YES  [ ] NO INTERVAL HPI/OVERNIGHT EVENTS: I feel weak ,tired and sleepy.   Vital Signs Last 24 Hrs  T(C): 36.5 (03 Jan 2019 11:30), Max: 37 (03 Jan 2019 04:31)  T(F): 97.7 (03 Jan 2019 11:30), Max: 98.6 (03 Jan 2019 04:31)  HR: 105 (03 Jan 2019 11:30) (103 - 117)  BP: 125/82 (03 Jan 2019 11:30) (100/69 - 125/82)  BP(mean): --  RR: 17 (03 Jan 2019 11:30) (17 - 19)  SpO2: 99% (03 Jan 2019 11:30) (97% - 100%)  I&O's Summary    02 Jan 2019 07:01  -  03 Jan 2019 07:00  --------------------------------------------------------  IN: 596 mL / OUT: 150 mL / NET: 446 mL    03 Jan 2019 07:01  -  03 Jan 2019 12:26  --------------------------------------------------------  IN: 0 mL / OUT: 1300 mL / NET: -1300 mL      MEDICATIONS  (STANDING):  aspirin enteric coated 81 milliGRAM(s) Oral daily  buMETAnide Infusion 1 mG/Hr (5 mL/Hr) IV Continuous <Continuous>  calcitriol   Capsule 0.5 MICROGram(s) Oral daily  calcium acetate 667 milliGRAM(s) Oral three times a day with meals  calcium carbonate 1250 mG  + Vitamin D (OsCal 500 + D) 1 Tablet(s) Oral three times a day  chlorhexidine 4% Liquid 1 Application(s) Topical <User Schedule>  dextrose 5%. 1000 milliLiter(s) (50 mL/Hr) IV Continuous <Continuous>  dextrose 50% Injectable 25 Gram(s) IV Push once  gabapentin 200 milliGRAM(s) Oral three times a day  hydrALAZINE 10 milliGRAM(s) Oral every 8 hours  insulin lispro (HumaLOG) corrective regimen sliding scale   SubCutaneous three times a day before meals  insulin lispro (HumaLOG) corrective regimen sliding scale   SubCutaneous at bedtime  levothyroxine 175 MICROGram(s) Oral daily  magnesium oxide 400 milliGRAM(s) Oral two times a day with meals  metolazone 5 milliGRAM(s) Oral daily  milrinone Infusion 0.5 MICROgram(s)/kG/Min (14.76 mL/Hr) IV Continuous <Continuous>  pantoprazole    Tablet 40 milliGRAM(s) Oral before breakfast  potassium chloride    Tablet ER 20 milliEquivalent(s) Oral daily  rifaximin 550 milliGRAM(s) Oral two times a day  spironolactone 50 milliGRAM(s) Oral daily    MEDICATIONS  (PRN):  acetaminophen   Tablet .. 650 milliGRAM(s) Oral every 6 hours PRN Mild Pain (1 - 3)  dextrose 40% Gel 15 Gram(s) Oral once PRN Blood Glucose LESS THAN 70 milliGRAM(s)/deciliter  glucagon  Injectable 1 milliGRAM(s) IntraMuscular once PRN Glucose LESS THAN 70 milligrams/deciliter  guaiFENesin   Syrup  (Sugar-Free) 200 milliGRAM(s) Oral every 6 hours PRN Cough    LABS:                        9.6    7.59  )-----------( 283      ( 03 Jan 2019 07:33 )             29.3     01-02    127<L>  |  89<L>  |  29<H>  ----------------------------<  207<H>  3.8   |  23  |  1.24    Ca    6.5<LL>      02 Jan 2019 21:06  Mg     1.9     01-03    TPro  7.9  /  Alb  3.7  /  TBili  4.0<H>  /  DBili  2.6<H>  /  AST  40  /  ALT  31  /  AlkPhos  327<H>  01-03    PT/INR - ( 01 Jan 2019 21:33 )   PT: 33.3 sec;   INR: 2.83 ratio         PTT - ( 01 Jan 2019 21:33 )  PTT:32.0 sec    CAPILLARY BLOOD GLUCOSE      POCT Blood Glucose.: 185 mg/dL (03 Jan 2019 11:33)  POCT Blood Glucose.: 144 mg/dL (03 Jan 2019 07:59)  POCT Blood Glucose.: 161 mg/dL (02 Jan 2019 21:29)  POCT Blood Glucose.: 162 mg/dL (02 Jan 2019 17:57)          REVIEW OF SYSTEMS:  CONSTITUTIONAL: No fever, weight loss, or fatigue  EYES: No eye pain, visual disturbances, or discharge  ENMT:  No difficulty hearing, tinnitus, vertigo; No sinus or throat pain  NECK: No pain or stiffness  RESPIRATORY: No cough, wheezing, chills or hemoptysis; No shortness of breath  CARDIOVASCULAR: No chest pain, palpitations, dizziness, or leg swelling  GASTROINTESTINAL: No abdominal or epigastric pain. No nausea, vomiting, or hematemesis; No diarrhea or constipation. No melena or hematochezia.  GENITOURINARY: No dysuria, frequency, hematuria, or incontinence  NEUROLOGICAL: No headaches, memory loss, loss of strength, numbness, or tremors      Consultant(s) Notes Reviewed:  [x ] YES  [ ] NO    PHYSICAL EXAM:  GENERAL: NAD, well-groomed, well-developed, not in any distress ,  HEAD:  Atraumatic, Normocephalic  EYES: EOMI, PERRLA, conjunctiva and sclera clear  NECK: JVD++++  NERVOUS SYSTEM:  Alert & Oriented X3, No focal deficit   CHEST/LUNG: Good air entry bilateral with no  rales, rhonchi, wheezing, or rubs  HEART: Regular rate and rhythm; No murmurs, rubs, or gallops  ABDOMEN: Soft, Nontender, Nondistended; Bowel sounds present  EXTREMITIES:  2+ Peripheral Pulses, No clubbing, cyanosis, but + edema    Care Discussed with Consultants/Other Providers [ x] YES  [ ] NO

## 2019-01-03 NOTE — PROGRESS NOTE ADULT - SUBJECTIVE AND OBJECTIVE BOX
Subjective:  - Fatigued today  - Ongoing abdominal distention  - Denies SOB at rest or with ambulation to commode, orthopnea, PND, CP, palpitations, lightheadedness, dizziness    Medications:  acetaminophen   Tablet .. 650 milliGRAM(s) Oral every 6 hours PRN  aspirin enteric coated 81 milliGRAM(s) Oral daily  buMETAnide Infusion 1 mG/Hr IV Continuous <Continuous>  calcitriol   Capsule 0.5 MICROGram(s) Oral daily  calcium acetate 667 milliGRAM(s) Oral three times a day with meals  calcium carbonate 1250 mG  + Vitamin D (OsCal 500 + D) 1 Tablet(s) Oral three times a day  chlorhexidine 4% Liquid 1 Application(s) Topical <User Schedule>  dextrose 40% Gel 15 Gram(s) Oral once PRN  dextrose 5%. 1000 milliLiter(s) IV Continuous <Continuous>  dextrose 50% Injectable 25 Gram(s) IV Push once  gabapentin 200 milliGRAM(s) Oral three times a day  glucagon  Injectable 1 milliGRAM(s) IntraMuscular once PRN  guaiFENesin   Syrup  (Sugar-Free) 200 milliGRAM(s) Oral every 6 hours PRN  hydrALAZINE 10 milliGRAM(s) Oral every 8 hours  insulin lispro (HumaLOG) corrective regimen sliding scale   SubCutaneous three times a day before meals  insulin lispro (HumaLOG) corrective regimen sliding scale   SubCutaneous at bedtime  levothyroxine 175 MICROGram(s) Oral daily  magnesium oxide 400 milliGRAM(s) Oral two times a day with meals  metolazone 5 milliGRAM(s) Oral daily  milrinone Infusion 0.5 MICROgram(s)/kG/Min IV Continuous <Continuous>  pantoprazole    Tablet 40 milliGRAM(s) Oral before breakfast  potassium chloride    Tablet ER 20 milliEquivalent(s) Oral daily  potassium chloride    Tablet ER 40 milliEquivalent(s) Oral once  rifaximin 550 milliGRAM(s) Oral two times a day  spironolactone 50 milliGRAM(s) Oral daily      Physical Exam:    Vitals:  Vital Signs Last 24 Hours  T(C): 36.5 (19 @ 11:30), Max: 37 (19 @ 04:31)  HR: 105 (19 @ 11:30) (103 - 117)  BP: 125/82 (19 @ 11:30) (100/69 - 125/82)  RR: 17 (19 @ 11:30) (17 - 19)  SpO2: 99% (19 @ 11:30) (97% - 100%)    Weight in k.9 ( @ 07:59)    I&O's Summary    2019 07:  -  2019 07:00  --------------------------------------------------------  IN: 596 mL / OUT: 150 mL / NET: 446 mL    2019 07:  -  2019 14:40  --------------------------------------------------------  IN: 840 mL / OUT: 1300 mL / NET: -460 mL    Tele: SR- -110 PVCs    General: No distress. Comfortable.  HEENT: EOM intact.  Neck: Neck supple. JVP severely elevated, distended EJ  Chest: Clear to auscultation bilaterally  CV: Tachycardic, Normal S1 and S2. Radial pulses normal. Tense non pitting edema through thighs bilaterally.  Abdomen: Distended, nontender  Skin: RUE PICC line with dressing c/d/i  Neurology: Alert and oriented times three. Sensation intact  Psych: Affect normal, poor insight    Labs:                        9.6    7.59  )-----------( 283      ( 2019 07:33 )             29.3     01-02    127<L>  |  89<L>  |  29<H>  ----------------------------<  207<H>  3.8   |  23  |  1.24    Ca    6.5<LL>      2019 21:06  Mg     1.9         TPro  7.9  /  Alb  3.7  /  TBili  4.0<H>  /  DBili  2.6<H>  /  AST  40  /  ALT  31  /  AlkPhos  327<H>      PT/INR - ( 2019 21:33 )   PT: 33.3 sec;   INR: 2.83 ratio         PTT - ( 2019 21:33 )  PTT:32.0 sec      Serum Pro-Brain Natriuretic Peptide: 2026 pg/mL ( @ 21:33) Subjective:  - Fatigued today  - Ongoing abdominal distention  - Denies SOB at rest or with ambulation to commode, orthopnea, PND, CP, palpitations, lightheadedness, dizziness    Medications:  acetaminophen   Tablet .. 650 milliGRAM(s) Oral every 6 hours PRN  aspirin enteric coated 81 milliGRAM(s) Oral daily  buMETAnide Infusion 1 mG/Hr IV Continuous <Continuous>  calcitriol   Capsule 0.5 MICROGram(s) Oral daily  calcium acetate 667 milliGRAM(s) Oral three times a day with meals  calcium carbonate 1250 mG  + Vitamin D (OsCal 500 + D) 1 Tablet(s) Oral three times a day  chlorhexidine 4% Liquid 1 Application(s) Topical <User Schedule>  dextrose 40% Gel 15 Gram(s) Oral once PRN  dextrose 5%. 1000 milliLiter(s) IV Continuous <Continuous>  dextrose 50% Injectable 25 Gram(s) IV Push once  gabapentin 200 milliGRAM(s) Oral three times a day  glucagon  Injectable 1 milliGRAM(s) IntraMuscular once PRN  guaiFENesin   Syrup  (Sugar-Free) 200 milliGRAM(s) Oral every 6 hours PRN  hydrALAZINE 10 milliGRAM(s) Oral every 8 hours  insulin lispro (HumaLOG) corrective regimen sliding scale   SubCutaneous three times a day before meals  insulin lispro (HumaLOG) corrective regimen sliding scale   SubCutaneous at bedtime  levothyroxine 175 MICROGram(s) Oral daily  magnesium oxide 400 milliGRAM(s) Oral two times a day with meals  metolazone 5 milliGRAM(s) Oral daily  milrinone Infusion 0.5 MICROgram(s)/kG/Min IV Continuous <Continuous>  pantoprazole    Tablet 40 milliGRAM(s) Oral before breakfast  potassium chloride    Tablet ER 20 milliEquivalent(s) Oral daily  potassium chloride    Tablet ER 40 milliEquivalent(s) Oral once  rifaximin 550 milliGRAM(s) Oral two times a day  spironolactone 50 milliGRAM(s) Oral daily      Physical Exam:    Vitals:  Vital Signs Last 24 Hours  T(C): 36.5 (19 @ 11:30), Max: 37 (19 @ 04:31)  HR: 105 (19 @ 11:30) (103 - 117)  BP: 125/82 (19 @ 11:30) (100/69 - 125/82)  RR: 17 (19 @ 11:30) (17 - 19)  SpO2: 99% (19 @ 11:30) (97% - 100%)    Weight in k.9 ( @ 07:59)    I&O's Summary    2019 07:  -  2019 07:00  --------------------------------------------------------  IN: 596 mL / OUT: 150 mL / NET: 446 mL    2019 07:  -  2019 14:40  --------------------------------------------------------  IN: 840 mL / OUT: 1300 mL / NET: -460 mL    Tele: SR- -110 PVCs    General: No distress. Comfortable.  HEENT: EOM intact.  Neck: Neck supple. JVP severely elevated, distended EJ  Chest: Clear to auscultation bilaterally  CV: Tachycardic, Normal S1 and S2. Radial pulses normal. Tense non pitting edema through thighs bilaterally.  Abdomen: Distended, nontender  Skin: RUE PICC line with dressing c/d/i, LE cool peripherally  Neurology: Alert and oriented times three. Sensation intact  Psych: Affect normal, poor insight    Labs:                        9.6    7.59  )-----------( 283      ( 2019 07:33 )             29.3     01-02    127<L>  |  89<L>  |  29<H>  ----------------------------<  207<H>  3.8   |  23  |  1.24    Ca    6.5<LL>      2019 21:06  Mg     1.9         TPro  7.9  /  Alb  3.7  /  TBili  4.0<H>  /  DBili  2.6<H>  /  AST  40  /  ALT  31  /  AlkPhos  327<H>      PT/INR - ( 2019 21:33 )   PT: 33.3 sec;   INR: 2.83 ratio         PTT - ( 2019 21:33 )  PTT:32.0 sec      Serum Pro-Brain Natriuretic Peptide: 2026 pg/mL ( @ 21:33)

## 2019-01-03 NOTE — PROGRESS NOTE ADULT - SUBJECTIVE AND OBJECTIVE BOX
[facility] [phone]        Patient seen and examined in bed.  She is urinating well at present        MEDICATIONS  (STANDING):  aspirin enteric coated 81 milliGRAM(s) Oral daily  buMETAnide Infusion 1 mG/Hr (5 mL/Hr) IV Continuous <Continuous>  calcitriol   Capsule 0.5 MICROGram(s) Oral daily  calcium acetate 667 milliGRAM(s) Oral three times a day with meals  calcium carbonate 1250 mG  + Vitamin D (OsCal 500 + D) 1 Tablet(s) Oral three times a day  chlorhexidine 4% Liquid 1 Application(s) Topical <User Schedule>  dextrose 5%. 1000 milliLiter(s) (50 mL/Hr) IV Continuous <Continuous>  dextrose 50% Injectable 25 Gram(s) IV Push once  gabapentin 200 milliGRAM(s) Oral three times a day  hydrALAZINE 10 milliGRAM(s) Oral every 8 hours  insulin lispro (HumaLOG) corrective regimen sliding scale   SubCutaneous three times a day before meals  insulin lispro (HumaLOG) corrective regimen sliding scale   SubCutaneous at bedtime  levothyroxine 175 MICROGram(s) Oral daily  magnesium oxide 400 milliGRAM(s) Oral two times a day with meals  metolazone 5 milliGRAM(s) Oral daily  milrinone Infusion 0.5 MICROgram(s)/kG/Min (14.76 mL/Hr) IV Continuous <Continuous>  pantoprazole    Tablet 40 milliGRAM(s) Oral before breakfast  potassium chloride    Tablet ER 20 milliEquivalent(s) Oral daily  rifaximin 550 milliGRAM(s) Oral two times a day  spironolactone 50 milliGRAM(s) Oral daily      VITAL:  T(C): , Max: 37 (01-03-19 @ 04:31)  T(F): , Max: 98.6 (01-03-19 @ 04:31)  HR: 105 (01-03-19 @ 11:30)  BP: 125/82 (01-03-19 @ 11:30)  BP(mean): --  RR: 17 (01-03-19 @ 11:30)  SpO2: 99% (01-03-19 @ 11:30)  Wt(kg): --    I and O's:    01-02 @ 07:01  -  01-03 @ 07:00  --------------------------------------------------------  IN: 596 mL / OUT: 150 mL / NET: 446 mL    01-03 @ 07:01  -  01-03 @ 13:07  --------------------------------------------------------  IN: 0 mL / OUT: 1300 mL / NET: -1300 mL      Height (cm): 170.18 (01-02 @ 17:04)  Weight (kg): 98.4 (01-02 @ 17:04)  BMI (kg/m2): 34 (01-02 @ 17:04)  BSA (m2): 2.09 (01-02 @ 17:04)    PHYSICAL EXAM:    Constitutional: NAD  Neck:  No JVD  Respiratory: CTAB/L  Cardiovascular: S1 and S2  Gastrointestinal: BS+, soft, NT  Extremities: +  peripheral edema  Neurological: A/O x 3, no focal deficits  Psychiatric: Normal mood, normal affect  : No Arteaga--external arteaga  Skin: No rashes  Access: Not applicable    LABS:                        9.6    7.59  )-----------( 283      ( 03 Jan 2019 07:33 )             29.3     01-02    127<L>  |  89<L>  |  29<H>  ----------------------------<  207<H>  3.8   |  23  |  1.24    Ca    6.5<LL>      02 Jan 2019 21:06  Mg     1.9     01-03    TPro  7.9  /  Alb  3.7  /  TBili  4.0<H>  /  DBili  2.6<H>  /  AST  40  /  ALT  31  /  AlkPhos  327<H>  01-03          Urine Studies:          RADIOLOGY & ADDITIONAL STUDIES:

## 2019-01-03 NOTE — PROGRESS NOTE ADULT - ASSESSMENT
63F w/ PMHx of Hfref (mod-severe MR, EF 10% 11/2018) s/p ICD  s/p  PICC  on milrinone ,h/o  thyroid CA s/p resection c/b hypoparathyroidism, HTN, DMII, p/w worsening fluid retention      Problem/Plan - 1:  ·  Problem: Acute on Chronic Systolic CHF exacerbation.  Plan: on Milrinone infusion and starting Bumex drip .  - Spironolactone, hydralazine and Metolazone   - Heart failure and Cardiology helping.   - strict and outs   - daily weight   - bid electrolytes  - continue spironolacto   Problem/Plan - 2:  ·  Problem: DM (diabetes mellitus).  Plan: -Sugars in acceptable range.  -Hold oral hypoglycemics  -Start HISS, check fsg qac/qhs  -check a1c in am  -consistent carb diet.      Problem/Plan - 3:  ·  Problem: HTN (hypertension).  Plan: Well controlled.      Problem/Plan - 4:  ·  Problem: Hypothyroidism   Plan: - continue levothyroxine   - calcitriol.      Problem/Plan - 5:  ·  Problem: Hypocalcemia.  Plan: - Replacing.  -calcium acetate   - calcium + D.      Problem/Plan - 6:  Problem: Anemia. Plan: HH stable.   no active bleeding   - monitor CBC.     Problem/Plan - 7:  ·  Problem: Elevated liver function tests.  Plan:  2/2 congestive hepatopathy   - continue rifaximin.      Problem/Plan - 8:  ·  Problem: Diabetic neuropathy .  Plan: - Continue Gabapentin.      Problem/Plan - 9:  ·  Problem: Need for prophylactic measure.  Plan: - sub q heparin.

## 2019-01-03 NOTE — PROGRESS NOTE ADULT - ASSESSMENT
63 year old  woman with NICM (EF 10%, LVIDD 6.1cm, dx 2013) s/p CRT-D (6/2017), Mod-Severe MR, Severe TR, HTN, HLD, DM2, and Thyroid Cancer s/p resection c/b hypoparathyroidism with chronic hypocalcemia presents with SOB and weight gain 2/2 acute on chronic heart failure exacerbation. Reports good urine output on current regimen, 4kg weight loss noted from admission weight. Labs for today pending. Remains significantly volume overloaded. Normotensive tolerating initiation of low dose of vasodilator. 63 year old  woman with NICM (EF 10%, LVIDD 6.1cm, dx 2013) s/p CRT-D (6/2017), Mod-Severe MR, Severe TR, HTN, HLD, DM2, and Thyroid Cancer s/p resection c/b hypoparathyroidism with chronic hypocalcemia presents with SOB and weight gain 2/2 acute on chronic heart failure exacerbation. Reports good urine output on current regimen, 4kg weight loss noted from admission weight. Labs for today pending. Remains significantly volume overloaded. Hypervolemic hyponatremia. Normotensive tolerating initiation of low dose of vasodilator.

## 2019-01-04 LAB
ALBUMIN SERPL ELPH-MCNC: 3.7 G/DL — SIGNIFICANT CHANGE UP (ref 3.3–5)
ALP SERPL-CCNC: 321 U/L — HIGH (ref 40–120)
ALT FLD-CCNC: 27 U/L — SIGNIFICANT CHANGE UP (ref 10–45)
ANION GAP SERPL CALC-SCNC: 18 MMOL/L — HIGH (ref 5–17)
ANION GAP SERPL CALC-SCNC: 19 MMOL/L — HIGH (ref 5–17)
ANION GAP SERPL CALC-SCNC: 19 MMOL/L — HIGH (ref 5–17)
AST SERPL-CCNC: 39 U/L — SIGNIFICANT CHANGE UP (ref 10–40)
BILIRUB SERPL-MCNC: 4.3 MG/DL — HIGH (ref 0.2–1.2)
BUN SERPL-MCNC: 24 MG/DL — HIGH (ref 7–23)
BUN SERPL-MCNC: 25 MG/DL — HIGH (ref 7–23)
BUN SERPL-MCNC: 25 MG/DL — HIGH (ref 7–23)
CALCIUM SERPL-MCNC: 6.9 MG/DL — LOW (ref 8.4–10.5)
CALCIUM SERPL-MCNC: 7.1 MG/DL — LOW (ref 8.4–10.5)
CALCIUM SERPL-MCNC: 7.1 MG/DL — LOW (ref 8.4–10.5)
CHLORIDE SERPL-SCNC: 85 MMOL/L — LOW (ref 96–108)
CHLORIDE SERPL-SCNC: 85 MMOL/L — LOW (ref 96–108)
CHLORIDE SERPL-SCNC: 86 MMOL/L — LOW (ref 96–108)
CO2 SERPL-SCNC: 23 MMOL/L — SIGNIFICANT CHANGE UP (ref 22–31)
CO2 SERPL-SCNC: 23 MMOL/L — SIGNIFICANT CHANGE UP (ref 22–31)
CO2 SERPL-SCNC: 25 MMOL/L — SIGNIFICANT CHANGE UP (ref 22–31)
CREAT SERPL-MCNC: 1.04 MG/DL — SIGNIFICANT CHANGE UP (ref 0.5–1.3)
CREAT SERPL-MCNC: 1.08 MG/DL — SIGNIFICANT CHANGE UP (ref 0.5–1.3)
CREAT SERPL-MCNC: 1.08 MG/DL — SIGNIFICANT CHANGE UP (ref 0.5–1.3)
GLUCOSE BLDC GLUCOMTR-MCNC: 147 MG/DL — HIGH (ref 70–99)
GLUCOSE BLDC GLUCOMTR-MCNC: 164 MG/DL — HIGH (ref 70–99)
GLUCOSE BLDC GLUCOMTR-MCNC: 200 MG/DL — HIGH (ref 70–99)
GLUCOSE BLDC GLUCOMTR-MCNC: 259 MG/DL — HIGH (ref 70–99)
GLUCOSE SERPL-MCNC: 111 MG/DL — HIGH (ref 70–99)
GLUCOSE SERPL-MCNC: 116 MG/DL — HIGH (ref 70–99)
GLUCOSE SERPL-MCNC: 188 MG/DL — HIGH (ref 70–99)
HCT VFR BLD CALC: 29.8 % — LOW (ref 34.5–45)
HGB BLD-MCNC: 9.6 G/DL — LOW (ref 11.5–15.5)
MAGNESIUM SERPL-MCNC: 1.6 MG/DL — SIGNIFICANT CHANGE UP (ref 1.6–2.6)
MAGNESIUM SERPL-MCNC: 1.6 MG/DL — SIGNIFICANT CHANGE UP (ref 1.6–2.6)
MAGNESIUM SERPL-MCNC: 1.7 MG/DL — SIGNIFICANT CHANGE UP (ref 1.6–2.6)
MCHC RBC-ENTMCNC: 22.4 PG — LOW (ref 27–34)
MCHC RBC-ENTMCNC: 32.2 GM/DL — SIGNIFICANT CHANGE UP (ref 32–36)
MCV RBC AUTO: 69.5 FL — LOW (ref 80–100)
PLATELET # BLD AUTO: 284 K/UL — SIGNIFICANT CHANGE UP (ref 150–400)
POTASSIUM SERPL-MCNC: 3.2 MMOL/L — LOW (ref 3.5–5.3)
POTASSIUM SERPL-MCNC: 3.6 MMOL/L — SIGNIFICANT CHANGE UP (ref 3.5–5.3)
POTASSIUM SERPL-MCNC: 4.2 MMOL/L — SIGNIFICANT CHANGE UP (ref 3.5–5.3)
POTASSIUM SERPL-SCNC: 3.2 MMOL/L — LOW (ref 3.5–5.3)
POTASSIUM SERPL-SCNC: 3.6 MMOL/L — SIGNIFICANT CHANGE UP (ref 3.5–5.3)
POTASSIUM SERPL-SCNC: 4.2 MMOL/L — SIGNIFICANT CHANGE UP (ref 3.5–5.3)
PROT SERPL-MCNC: 7.8 G/DL — SIGNIFICANT CHANGE UP (ref 6–8.3)
RBC # BLD: 4.29 M/UL — SIGNIFICANT CHANGE UP (ref 3.8–5.2)
RBC # FLD: 22.5 % — HIGH (ref 10.3–14.5)
SODIUM SERPL-SCNC: 126 MMOL/L — LOW (ref 135–145)
SODIUM SERPL-SCNC: 128 MMOL/L — LOW (ref 135–145)
SODIUM SERPL-SCNC: 129 MMOL/L — LOW (ref 135–145)
WBC # BLD: 7.64 K/UL — SIGNIFICANT CHANGE UP (ref 3.8–10.5)
WBC # FLD AUTO: 7.64 K/UL — SIGNIFICANT CHANGE UP (ref 3.8–10.5)

## 2019-01-04 PROCEDURE — 99233 SBSQ HOSP IP/OBS HIGH 50: CPT

## 2019-01-04 RX ORDER — POTASSIUM CHLORIDE 20 MEQ
40 PACKET (EA) ORAL EVERY 4 HOURS
Qty: 0 | Refills: 0 | Status: COMPLETED | OUTPATIENT
Start: 2019-01-04 | End: 2019-01-04

## 2019-01-04 RX ORDER — POTASSIUM CHLORIDE 20 MEQ
40 PACKET (EA) ORAL
Qty: 0 | Refills: 0 | Status: DISCONTINUED | OUTPATIENT
Start: 2019-01-04 | End: 2019-01-04

## 2019-01-04 RX ORDER — METOPROLOL TARTRATE 50 MG
12.5 TABLET ORAL DAILY
Qty: 0 | Refills: 0 | Status: DISCONTINUED | OUTPATIENT
Start: 2019-01-05 | End: 2019-01-15

## 2019-01-04 RX ORDER — POTASSIUM CHLORIDE 20 MEQ
40 PACKET (EA) ORAL DAILY
Qty: 0 | Refills: 0 | Status: DISCONTINUED | OUTPATIENT
Start: 2019-01-04 | End: 2019-01-10

## 2019-01-04 RX ADMIN — SPIRONOLACTONE 50 MILLIGRAM(S): 25 TABLET, FILM COATED ORAL at 05:40

## 2019-01-04 RX ADMIN — Medication 1: at 16:56

## 2019-01-04 RX ADMIN — Medication 10 MILLIGRAM(S): at 13:34

## 2019-01-04 RX ADMIN — Medication 667 MILLIGRAM(S): at 12:09

## 2019-01-04 RX ADMIN — Medication 1: at 12:10

## 2019-01-04 RX ADMIN — GABAPENTIN 200 MILLIGRAM(S): 400 CAPSULE ORAL at 13:33

## 2019-01-04 RX ADMIN — MILRINONE LACTATE 14.76 MICROGRAM(S)/KG/MIN: 1 INJECTION, SOLUTION INTRAVENOUS at 22:36

## 2019-01-04 RX ADMIN — Medication 81 MILLIGRAM(S): at 12:10

## 2019-01-04 RX ADMIN — PANTOPRAZOLE SODIUM 40 MILLIGRAM(S): 20 TABLET, DELAYED RELEASE ORAL at 05:40

## 2019-01-04 RX ADMIN — Medication 1 TABLET(S): at 22:36

## 2019-01-04 RX ADMIN — GABAPENTIN 200 MILLIGRAM(S): 400 CAPSULE ORAL at 22:36

## 2019-01-04 RX ADMIN — MAGNESIUM OXIDE 400 MG ORAL TABLET 400 MILLIGRAM(S): 241.3 TABLET ORAL at 08:41

## 2019-01-04 RX ADMIN — Medication 175 MICROGRAM(S): at 05:40

## 2019-01-04 RX ADMIN — Medication 1 TABLET(S): at 13:33

## 2019-01-04 RX ADMIN — BUMETANIDE 5 MG/HR: 0.25 INJECTION INTRAMUSCULAR; INTRAVENOUS at 22:36

## 2019-01-04 RX ADMIN — Medication 667 MILLIGRAM(S): at 08:41

## 2019-01-04 RX ADMIN — Medication 10 MILLIGRAM(S): at 05:40

## 2019-01-04 RX ADMIN — Medication 200 MILLIGRAM(S): at 22:40

## 2019-01-04 RX ADMIN — MAGNESIUM OXIDE 400 MG ORAL TABLET 400 MILLIGRAM(S): 241.3 TABLET ORAL at 16:57

## 2019-01-04 RX ADMIN — Medication 1: at 22:36

## 2019-01-04 RX ADMIN — Medication 40 MILLIEQUIVALENT(S): at 13:34

## 2019-01-04 RX ADMIN — Medication 40 MILLIEQUIVALENT(S): at 17:14

## 2019-01-04 RX ADMIN — Medication 1 TABLET(S): at 05:40

## 2019-01-04 RX ADMIN — Medication 10 MILLIGRAM(S): at 22:35

## 2019-01-04 RX ADMIN — Medication 667 MILLIGRAM(S): at 16:57

## 2019-01-04 RX ADMIN — GABAPENTIN 200 MILLIGRAM(S): 400 CAPSULE ORAL at 05:40

## 2019-01-04 RX ADMIN — CALCITRIOL 0.5 MICROGRAM(S): 0.5 CAPSULE ORAL at 12:10

## 2019-01-04 RX ADMIN — Medication 40 MILLIEQUIVALENT(S): at 12:09

## 2019-01-04 NOTE — PROGRESS NOTE ADULT - ASSESSMENT
A 63-year-old female with past medical history of hypertension, diabetes, chronic kidney disease stage III, presents with grossly volume overloaded state.  She has hypervolemic, hyponatremia and will need aggressive diuresis.  Hypocalcemia and hypoparathyroidism  1.	Cardiology.  The patient will be on a Bumex drip  and excellent urine output  2.	Renal.  Simply trend her serum sodium; no need for Samsca at present.  Avoid excessive amounts of liquids without calories.  Encourage protein intake.  Additional KDUR 40meq po x 3 doses and then repeat the potassium.  May need additional potassium as well  3.	Endo.  Continue with Synthroid.  Once she is out of heart failure, the dose of Synthroid may need to be adjusted.  Unclear how much medication she is absorbing as she likely has got edema as well.

## 2019-01-04 NOTE — PHYSICAL THERAPY INITIAL EVALUATION ADULT - PERTINENT HX OF CURRENT PROBLEM, REHAB EVAL
63F with PMHx of HFrEF (mod-severe MR, EF 10% 11/2018) s/p ICD, s/p PICC on milrinone, h/o thyroid CA s/p resection c/b hypoparathyroidism, HTN, DMII, p/w worsening fluid retention. Pt recent admission for vaginal bleeding and HF exacerbation. Pt a/w CHF exacerbation treated with Milrinone and Bumex drip.

## 2019-01-04 NOTE — PROGRESS NOTE ADULT - SUBJECTIVE AND OBJECTIVE BOX
NEPHROLOGY-[facility] [phone]        Patient seen and examined in bed.  She has excellent urine outpt        MEDICATIONS  (STANDING):  aspirin enteric coated 81 milliGRAM(s) Oral daily  buMETAnide Infusion 1 mG/Hr (5 mL/Hr) IV Continuous <Continuous>  calcitriol   Capsule 0.5 MICROGram(s) Oral daily  calcium acetate 667 milliGRAM(s) Oral three times a day with meals  calcium carbonate 1250 mG  + Vitamin D (OsCal 500 + D) 1 Tablet(s) Oral three times a day  chlorhexidine 4% Liquid 1 Application(s) Topical <User Schedule>  dextrose 5%. 1000 milliLiter(s) (50 mL/Hr) IV Continuous <Continuous>  dextrose 50% Injectable 25 Gram(s) IV Push once  gabapentin 200 milliGRAM(s) Oral three times a day  hydrALAZINE 10 milliGRAM(s) Oral every 8 hours  insulin lispro (HumaLOG) corrective regimen sliding scale   SubCutaneous three times a day before meals  insulin lispro (HumaLOG) corrective regimen sliding scale   SubCutaneous at bedtime  levothyroxine 175 MICROGram(s) Oral daily  magnesium oxide 400 milliGRAM(s) Oral two times a day with meals  metolazone 5 milliGRAM(s) Oral daily  milrinone Infusion 0.5 MICROgram(s)/kG/Min (14.76 mL/Hr) IV Continuous <Continuous>  pantoprazole    Tablet 40 milliGRAM(s) Oral before breakfast  potassium chloride    Tablet ER 20 milliEquivalent(s) Oral daily  rifaximin 550 milliGRAM(s) Oral two times a day  spironolactone 50 milliGRAM(s) Oral daily      VITAL:  T(C): , Max: 37.1 (01-04-19 @ 04:33)  T(F): , Max: 98.8 (01-04-19 @ 04:33)  HR: 107 (01-04-19 @ 08:35)  BP: 116/81 (01-04-19 @ 08:35)  BP(mean): --  RR: 18 (01-04-19 @ 08:35)  SpO2: 98% (01-04-19 @ 08:35)  Wt(kg): --    I and O's:    01-03 @ 07:01  -  01-04 @ 07:00  --------------------------------------------------------  IN: 1176 mL / OUT: 4800 mL / NET: -3624 mL          PHYSICAL EXAM:    Constitutional: NAD  Neck:  + JVD  Respiratory: CTAB/L  Cardiovascular: S1 and S2  Gastrointestinal: BS+, soft, NT   Extremities: +  peripheral edema  Neurological: A/O x 3, no focal deficits  Psychiatric: Normal mood, normal affect  : No Gustafson  Skin: No rashes  Access: Not applicable    LABS:                        9.6    7.64  )-----------( 284      ( 04 Jan 2019 08:17 )             29.8     01-04    129<L>  |  85<L>  |  25<H>  ----------------------------<  116<H>  3.2<L>   |  25  |  1.08    Ca    7.1<L>      04 Jan 2019 06:40  Mg     1.7     01-04    TPro  7.8  /  Alb  3.7  /  TBili  4.3<H>  /  DBili  x   /  AST  39  /  ALT  27  /  AlkPhos  321<H>  01-04          Urine Studies:          RADIOLOGY & ADDITIONAL STUDIES:

## 2019-01-04 NOTE — CONSULT NOTE ADULT - SUBJECTIVE AND OBJECTIVE BOX
EP ATTENDING    History: She is a pleasant 62 y/o female PMH severe NICM (LVEF 10-15%) who had a Medtronic Biv-ICD implanted at [place]. A LHC at [place] was also unremarkable. She now returns for with a severe decompensation of her NICM (JVP > 12, LE edema and a near 14 lb weight gain). Her device interrogation recently demonstrates excellent RA, RV and LV lead functions, but with frequent episodes of NSVT. She denies palpitations nor high voltage therapy. EP is now called because she is persistently tachycardic. Review of her EKGs and tele show the rhythm is sinus with adequate BiV-tracking (RBBB in V1, negative in I/L). She hasn't had any more significant NSVT despite milrinone.    PAST MEDICAL & SURGICAL HISTORY:  Asthma  NICM  DM (diabetes mellitus)  HTN (hypertension)    PShx: thyroid CA s/p thyroidectomy, Medtronic BiV-ICD    Allergies: penicillin    FAMILY HISTORY:  No pertinent family history in first degree relatives    SOCIAL HISTORY:    (x ) Non-smoker ( ) Smoker ( ) Alcohol Abuse ( ) IVDA    REVIEW OF SYSTEMS:     CONSTITUTIONAL: No fever, chills, weight loss, or fatigue  RESPIRATORY: No cough, wheezing +SOB, +EDDY, +Orthopnea  CARDIOVASCULAR: No chest pain, palpitations, syncope +LE edema  GASTROINTESTINAL: No nausea, vomiting, diarrhea or constipation. No melena or hematochezia. +abdominal discomfort  GENITOURINARY: No dysuria, frequency, hematuria, or incontinence  NEUROLOGICAL: No headaches, memory loss, loss of strength, numbness, or tremors	    [x ] All others negative	  [ ] Unable to obtain    Meds  acetaminophen   Tablet .. 650 milliGRAM(s) Oral every 6 hours PRN  aspirin enteric coated 81 milliGRAM(s) Oral daily  buMETAnide Infusion 1 mG/Hr IV Continuous <Continuous>  calcitriol   Capsule 0.5 MICROGram(s) Oral daily  calcium acetate 667 milliGRAM(s) Oral three times a day with meals  calcium carbonate 1250 mG  + Vitamin D (OsCal 500 + D) 1 Tablet(s) Oral three times a day  chlorhexidine 4% Liquid 1 Application(s) Topical <User Schedule>  dextrose 40% Gel 15 Gram(s) Oral once PRN  dextrose 5%. 1000 milliLiter(s) IV Continuous <Continuous>  dextrose 50% Injectable 25 Gram(s) IV Push once  gabapentin 200 milliGRAM(s) Oral three times a day  glucagon  Injectable 1 milliGRAM(s) IntraMuscular once PRN  guaiFENesin   Syrup  (Sugar-Free) 200 milliGRAM(s) Oral every 6 hours PRN  hydrALAZINE 10 milliGRAM(s) Oral every 8 hours  insulin lispro (HumaLOG) corrective regimen sliding scale   SubCutaneous three times a day before meals  insulin lispro (HumaLOG) corrective regimen sliding scale   SubCutaneous at bedtime  levothyroxine 175 MICROGram(s) Oral daily  magnesium oxide 400 milliGRAM(s) Oral two times a day with meals  metolazone 5 milliGRAM(s) Oral daily  milrinone Infusion 0.5 MICROgram(s)/kG/Min IV Continuous <Continuous>  pantoprazole    Tablet 40 milliGRAM(s) Oral before breakfast  potassium chloride    Tablet ER 20 milliEquivalent(s) Oral daily  rifaximin 550 milliGRAM(s) Oral two times a day  spironolactone 50 milliGRAM(s) Oral daily                            9.6    7.64  )-----------( 284      ( 04 Jan 2019 08:17 )             29.8       01-04    129<L>  |  85<L>  |  25<H>  ----------------------------<  116<H>  3.2<L>   |  25  |  1.08    Ca    7.1<L>      04 Jan 2019 06:40  Mg     1.7     01-04    TPro  7.8  /  Alb  3.7  /  TBili  4.3<H>  /  DBili  x   /  AST  39  /  ALT  27  /  AlkPhos  321<H>  01-04      T(C): 36.7 (01-04-19 @ 08:35), Max: 37.1 (01-04-19 @ 04:33)  HR: 107 (01-04-19 @ 08:35) (104 - 110)  BP: 116/81 (01-04-19 @ 08:35) (108/77 - 125/82)  RR: 18 (01-04-19 @ 08:35) (17 - 19)  SpO2: 98% (01-04-19 @ 08:35) (97% - 99%)  Wt(kg): --    JVP 12  tachy, no murmurs  lungs with rales  soft nt/nd  + 1 edema      EKG: sinus tachycardia, Biv pacing    A/P) She is a pleasant 62 y/o female PMH severe NICM (LVEF 10-15%) who had a Medtronic Biv-ICD implanted at [place]. A LHC at [place] was also unremarkable. She now returns for with a severe decompensation of her NICM (JVP > 12, LE edema and a near 14 lb weight gain). Her device interrogation recently demonstrates excellent RA, RV and LV lead functions, but with frequent episodes of NSVT. She denies palpitations nor high voltage therapy. EP is now called because she is persistently tachycardic. Review of her EKGs and tele show the rhythm is sinus with adequate BiV-tracking (RBBB in V1, negative in I/L). She hasn't had any more significant NSVT despite milrinone.    -her sinus tachycardia is a response to being off beta blockers and need for milrinone. Would not restart beta blockers at this time, and given that her device is adequately BiV-tracking her sinus tachycardia I would not readjust her device settings  -f/u CHF regarding milrinone  -no need for AAD  -no need to recheck ICD  -a future option includes turning off BiV pacing to see if she responds better. This can be addressed when she's euvolemic and I will discuss with CHF team  -will follow  -f/u with [facility] after discharge

## 2019-01-04 NOTE — PROGRESS NOTE ADULT - SUBJECTIVE AND OBJECTIVE BOX
Subjective:  pt seen and examined, no complaints, ROS - .    acetaminophen   Tablet .. 650 milliGRAM(s) Oral every 6 hours PRN  aspirin enteric coated 81 milliGRAM(s) Oral daily  buMETAnide Infusion 1 mG/Hr IV Continuous <Continuous>  calcitriol   Capsule 0.5 MICROGram(s) Oral daily  calcium acetate 667 milliGRAM(s) Oral three times a day with meals  calcium carbonate 1250 mG  + Vitamin D (OsCal 500 + D) 1 Tablet(s) Oral three times a day  chlorhexidine 4% Liquid 1 Application(s) Topical <User Schedule>  dextrose 40% Gel 15 Gram(s) Oral once PRN  dextrose 5%. 1000 milliLiter(s) IV Continuous <Continuous>  dextrose 50% Injectable 25 Gram(s) IV Push once  gabapentin 200 milliGRAM(s) Oral three times a day  glucagon  Injectable 1 milliGRAM(s) IntraMuscular once PRN  guaiFENesin   Syrup  (Sugar-Free) 200 milliGRAM(s) Oral every 6 hours PRN  hydrALAZINE 10 milliGRAM(s) Oral every 8 hours  insulin lispro (HumaLOG) corrective regimen sliding scale   SubCutaneous three times a day before meals  insulin lispro (HumaLOG) corrective regimen sliding scale   SubCutaneous at bedtime  levothyroxine 175 MICROGram(s) Oral daily  magnesium oxide 400 milliGRAM(s) Oral two times a day with meals  metolazone 5 milliGRAM(s) Oral daily  milrinone Infusion 0.5 MICROgram(s)/kG/Min IV Continuous <Continuous>  pantoprazole    Tablet 40 milliGRAM(s) Oral before breakfast  potassium chloride    Tablet ER 20 milliEquivalent(s) Oral daily  rifaximin 550 milliGRAM(s) Oral two times a day  spironolactone 50 milliGRAM(s) Oral daily                            9.6    7.59  )-----------( 283      ( 03 Jan 2019 07:33 )             29.3       Hemoglobin: 9.6 g/dL (01-03 @ 07:33)  Hemoglobin: 10.5 g/dL (01-02 @ 11:27)  Hemoglobin: 8.9 g/dL (01-02 @ 08:11)  Hemoglobin: 9.9 g/dL (01-01 @ 21:33)      01-03    128<L>  |  84<L>  |  25<H>  ----------------------------<  234<H>  3.9   |  23  |  1.08    Ca    7.3<L>      03 Jan 2019 18:34  Mg     1.8     01-03    TPro  7.9  /  Alb  3.7  /  TBili  4.0<H>  /  DBili  2.6<H>  /  AST  40  /  ALT  31  /  AlkPhos  327<H>  01-03    Creatinine Trend: 1.08<--, 1.13<--, 1.24<--, 1.25<--, 1.18<--, 1.40<--    COAGS:           T(C): 37.1 (01-04-19 @ 04:33), Max: 37.1 (01-04-19 @ 04:33)  HR: 110 (01-04-19 @ 04:33) (104 - 110)  BP: 108/77 (01-04-19 @ 04:33) (108/77 - 125/82)  RR: 19 (01-04-19 @ 04:33) (17 - 19)  SpO2: 97% (01-04-19 @ 04:33) (97% - 99%)  Wt(kg): --    I&O's Summary    02 Jan 2019 07:01  -  03 Jan 2019 07:00  --------------------------------------------------------  IN: 596 mL / OUT: 150 mL / NET: 446 mL    03 Jan 2019 07:01  -  04 Jan 2019 05:42  --------------------------------------------------------  IN: 1176 mL / OUT: 4000 mL / NET: -2824 mL        HEENT:  (-)icterus (-)pallor  CV: N S1 S2 1/6 ROSALINDA (+)2 Pulses B/l  Resp:  Clear to ausculatation B/L, normal effort  GI: (+) BS Soft, NT, ND  Lymph:  (+)Edema, (-)obvious lymphadenopathy  Skin: Warm to touch, Normal turgor  Psych: Appropriate mood and affect        ECG:    A sense V paced	    RADIOLOGY:         CXR:  No infiltartes     ASSESSMENT/PLAN: 	63y Female  F with PMHx of NICM s/p AICD, HTN, moderate-severe MR, reportedly recent LHC at [place] was negative, thyroid cancer s/p thyroidectomy, DM,  admitted with decompensated acute on chronic systolic heart failure.    - ASA,  statin   - heart failure follow up , cont zaroxlyn , hydralizine , aldactone   - cont Primacor Gtt / Bumex gtt    - renal follow up   -  GI / DVT prophylaxis,  keep K>4, mag >2.0   - Maintain strict I+O's  - daily weights   D/W Dr Grimes

## 2019-01-04 NOTE — PROGRESS NOTE ADULT - ASSESSMENT
63F w/ PMHx of Hfref (mod-severe MR, EF 10% 11/2018) s/p ICD  s/p  PICC  on milrinone ,h/o  thyroid CA s/p resection c/b hypoparathyroidism, HTN, DMII, p/w worsening fluid retention      Problem/Plan - 1:  ·  Problem: Acute on Chronic Systolic CHF exacerbation.  Plan: Clinically improving.   -on Milrinone infusion and starting Bumex drip .  - Spironolactone, hydralazine and Metolazone   - Heart failure and Cardiology helping.   - strict and outs   - daily weight   - bid electrolytes   Problem/Plan - 2:  ·  Problem: DM (diabetes mellitus).  Plan: -Sugars in acceptable range.  -Hold oral hypoglycemics  -Start HISS, check fsg qac/qhs  -check a1c in am  -consistent carb diet.      Problem/Plan - 3:  ·  Problem: HTN (hypertension).  Plan: Well controlled.      Problem/Plan - 4:  ·  Problem: Hypothyroidism   Plan: - continue levothyroxine   - calcitriol.      Problem/Plan - 5:  ·  Problem: Hypocalcemia & Hyponatremia .  Plan: - Renal helping.   -calcium acetate   - calcium + D.      Problem/Plan - 6:  Problem: Anemia. Plan: HH stable.   no active bleeding   - monitor CBC.     Problem/Plan - 7:  ·  Problem: Elevated liver function tests.  Plan:  2/2 congestive hepatopathy   - continue rifaximin.      Problem/Plan - 8:  ·  Problem: Diabetic neuropathy with Foot Pain .  Plan: - Continue Gabapentin. Podiatry consulted.      Problem/Plan - 9:  ·  Problem: Need for prophylactic measure.  Plan: - sub q heparin.

## 2019-01-04 NOTE — PHYSICAL THERAPY INITIAL EVALUATION ADULT - GENERAL OBSERVATIONS, REHAB EVAL
PT attempted to see pt for initial eval earlier, pt asking to eat lunch. Pt now rec'd standing in room with PCA, +tele, 2 peripheral IV lines, and NAD. Pt agreed to ambulate with PT.

## 2019-01-04 NOTE — PROGRESS NOTE ADULT - SUBJECTIVE AND OBJECTIVE BOX
Subjective:  - She reports feeling very fatigued and not doing much activity  - She feels her abdominal distention is improving.  - She denies any CP, palpitations, lightheadedness, dizziness, dyspnea, SOB at rest, orthopnea, or PND.    Medications:  acetaminophen   Tablet .. 650 milliGRAM(s) Oral every 6 hours PRN  aspirin enteric coated 81 milliGRAM(s) Oral daily  buMETAnide Infusion 1 mG/Hr IV Continuous <Continuous>  calcitriol   Capsule 0.5 MICROGram(s) Oral daily  calcium acetate 667 milliGRAM(s) Oral three times a day with meals  calcium carbonate 1250 mG  + Vitamin D (OsCal 500 + D) 1 Tablet(s) Oral three times a day  chlorhexidine 4% Liquid 1 Application(s) Topical <User Schedule>  dextrose 40% Gel 15 Gram(s) Oral once PRN  dextrose 5%. 1000 milliLiter(s) IV Continuous <Continuous>  dextrose 50% Injectable 25 Gram(s) IV Push once  gabapentin 200 milliGRAM(s) Oral three times a day  glucagon  Injectable 1 milliGRAM(s) IntraMuscular once PRN  guaiFENesin   Syrup  (Sugar-Free) 200 milliGRAM(s) Oral every 6 hours PRN  hydrALAZINE 10 milliGRAM(s) Oral every 8 hours  insulin lispro (HumaLOG) corrective regimen sliding scale   SubCutaneous three times a day before meals  insulin lispro (HumaLOG) corrective regimen sliding scale   SubCutaneous at bedtime  levothyroxine 175 MICROGram(s) Oral daily  magnesium oxide 400 milliGRAM(s) Oral two times a day with meals  metolazone 5 milliGRAM(s) Oral daily  milrinone Infusion 0.5 MICROgram(s)/kG/Min IV Continuous <Continuous>  pantoprazole    Tablet 40 milliGRAM(s) Oral before breakfast  potassium chloride    Tablet ER 40 milliEquivalent(s) Oral daily  potassium chloride    Tablet ER 40 milliEquivalent(s) Oral every 2 hours  rifaximin 550 milliGRAM(s) Oral two times a day  spironolactone 50 milliGRAM(s) Oral daily      Physical Exam:    Vitals:  Vital Signs Last 24 Hours  T(C): 36.7 (19 @ 12:11), Max: 37.1 (19 @ 04:33)  HR: 110 (19 @ 12:11) (104 - 110)  BP: 133/91 (19 @ 12:11) (108/77 - 133/91)  RR: 18 (19 @ 12:11) (18 - 19)  SpO2: 100% (19 @ 12:11) (97% - 100%)    Weight in k.8 ( @ 08:10)    I&O's Summary    2019 07:  -  2019 07:00  --------------------------------------------------------  IN: 1176 mL / OUT: 4800 mL / NET: -3624 mL    2019 07:  -  2019 12:27  --------------------------------------------------------  IN: 0 mL / OUT: 1650 mL / NET: -1650 mL    Tele: SR/ 100-110    General: No distress. Comfortable.  HEENT: EOM intact.  Neck: Neck supple. JVP severely elevated, distended EJ. No masses  Chest: Clear to auscultation bilaterally  CV: RRR. Normal S1 and S2. Radial pulses normal. Tense non pitting edema through thighs bilaterally.  Abdomen: Distended, nontender  Skin: RUE PICC line with dressing c/d/i, LE cool peripherally  Neurology: Alert and oriented times three. Sensation intact  Psych: Affect normal    Labs:                        9.6    7.64  )-----------( 284      ( 2019 08:17 )             29.8         129<L>  |  85<L>  |  25<H>  ----------------------------<  116<H>  3.2<L>   |  25  |  1.08    Ca    7.1<L>      2019 06:40  Mg     1.7         TPro  7.8  /  Alb  3.7  /  TBili  4.3<H>  /  DBili  x   /  AST  39  /  ALT  27  /  AlkPhos  321<H>            Serum Pro-Brain Natriuretic Peptide: 2026 pg/mL ( @ 21:33)

## 2019-01-04 NOTE — PROGRESS NOTE ADULT - ASSESSMENT
63 year old  woman with NICM (EF 10%, LVIDD 6.1cm, dx 2013) s/p CRT-D (6/2017), Mod-Severe MR, Severe TR, HTN, HLD, DM2, and Thyroid Cancer s/p resection c/b hypoparathyroidism with chronic hypocalcemia presents with SOB and weight gain 2/2 acute on chronic heart failure exacerbation. Currently diuresing well on Bumex gtt with 24 hr total UOP 4.8L, net negative 3.6L, but still remains significantly volume overloaded. Hypervolemic hyponatremia. SCr stable 1.08. Normotensive tolerating initiation of low dose of vasodilator.

## 2019-01-04 NOTE — PROGRESS NOTE ADULT - ATTENDING COMMENTS
Agree with above assessment and plan as outlined above.    - cont inotrope assisted diuresis  - EP eval appreciated    Augusto Grimes MD, FACC

## 2019-01-04 NOTE — PHYSICAL THERAPY INITIAL EVALUATION ADULT - ADDITIONAL COMMENTS
Pt states she lives in a private home with family with about 3-4 steps to enter and a flight of steps to bedroom (+handrail). Pt states prior to admission being independent with all functional mobility and ADLs. Pt states she used a straight cane for stair negotiation at baseline. Pt states she lives in a private home with family with about 3-4 steps to enter and a flight of steps to bedroom (+handrail). Pt states prior to admission being independent with all functional mobility and ADLs. Pt states she used a straight cane for stair negotiation at baseline or when she felt her fluid was impacting her walking.

## 2019-01-05 LAB
ANION GAP SERPL CALC-SCNC: 17 MMOL/L — SIGNIFICANT CHANGE UP (ref 5–17)
ANION GAP SERPL CALC-SCNC: 17 MMOL/L — SIGNIFICANT CHANGE UP (ref 5–17)
BUN SERPL-MCNC: 26 MG/DL — HIGH (ref 7–23)
BUN SERPL-MCNC: 29 MG/DL — HIGH (ref 7–23)
CALCIUM SERPL-MCNC: 6.8 MG/DL — LOW (ref 8.4–10.5)
CALCIUM SERPL-MCNC: 7.3 MG/DL — LOW (ref 8.4–10.5)
CHLORIDE SERPL-SCNC: 81 MMOL/L — LOW (ref 96–108)
CHLORIDE SERPL-SCNC: 84 MMOL/L — LOW (ref 96–108)
CO2 SERPL-SCNC: 25 MMOL/L — SIGNIFICANT CHANGE UP (ref 22–31)
CO2 SERPL-SCNC: 26 MMOL/L — SIGNIFICANT CHANGE UP (ref 22–31)
CREAT SERPL-MCNC: 1.08 MG/DL — SIGNIFICANT CHANGE UP (ref 0.5–1.3)
CREAT SERPL-MCNC: 1.1 MG/DL — SIGNIFICANT CHANGE UP (ref 0.5–1.3)
GLUCOSE BLDC GLUCOMTR-MCNC: 133 MG/DL — HIGH (ref 70–99)
GLUCOSE BLDC GLUCOMTR-MCNC: 154 MG/DL — HIGH (ref 70–99)
GLUCOSE BLDC GLUCOMTR-MCNC: 221 MG/DL — HIGH (ref 70–99)
GLUCOSE BLDC GLUCOMTR-MCNC: 267 MG/DL — HIGH (ref 70–99)
GLUCOSE SERPL-MCNC: 128 MG/DL — HIGH (ref 70–99)
GLUCOSE SERPL-MCNC: 186 MG/DL — HIGH (ref 70–99)
HCT VFR BLD CALC: 28.9 % — LOW (ref 34.5–45)
HGB BLD-MCNC: 9.2 G/DL — LOW (ref 11.5–15.5)
MAGNESIUM SERPL-MCNC: 1.5 MG/DL — LOW (ref 1.6–2.6)
MAGNESIUM SERPL-MCNC: 1.6 MG/DL — SIGNIFICANT CHANGE UP (ref 1.6–2.6)
MCHC RBC-ENTMCNC: 22.1 PG — LOW (ref 27–34)
MCHC RBC-ENTMCNC: 31.8 GM/DL — LOW (ref 32–36)
MCV RBC AUTO: 69.5 FL — LOW (ref 80–100)
PLATELET # BLD AUTO: 312 K/UL — SIGNIFICANT CHANGE UP (ref 150–400)
POTASSIUM SERPL-MCNC: 3.7 MMOL/L — SIGNIFICANT CHANGE UP (ref 3.5–5.3)
POTASSIUM SERPL-MCNC: 4.2 MMOL/L — SIGNIFICANT CHANGE UP (ref 3.5–5.3)
POTASSIUM SERPL-SCNC: 3.7 MMOL/L — SIGNIFICANT CHANGE UP (ref 3.5–5.3)
POTASSIUM SERPL-SCNC: 4.2 MMOL/L — SIGNIFICANT CHANGE UP (ref 3.5–5.3)
RBC # BLD: 4.16 M/UL — SIGNIFICANT CHANGE UP (ref 3.8–5.2)
RBC # FLD: 22.9 % — HIGH (ref 10.3–14.5)
SODIUM SERPL-SCNC: 124 MMOL/L — LOW (ref 135–145)
SODIUM SERPL-SCNC: 126 MMOL/L — LOW (ref 135–145)
WBC # BLD: 6.41 K/UL — SIGNIFICANT CHANGE UP (ref 3.8–10.5)
WBC # FLD AUTO: 6.41 K/UL — SIGNIFICANT CHANGE UP (ref 3.8–10.5)

## 2019-01-05 PROCEDURE — 73630 X-RAY EXAM OF FOOT: CPT | Mod: 26,50

## 2019-01-05 RX ORDER — MAGNESIUM SULFATE 500 MG/ML
1 VIAL (ML) INJECTION ONCE
Qty: 0 | Refills: 0 | Status: COMPLETED | OUTPATIENT
Start: 2019-01-05 | End: 2019-01-05

## 2019-01-05 RX ADMIN — BUMETANIDE 5 MG/HR: 0.25 INJECTION INTRAMUSCULAR; INTRAVENOUS at 22:07

## 2019-01-05 RX ADMIN — Medication 10 MILLIGRAM(S): at 22:07

## 2019-01-05 RX ADMIN — Medication 667 MILLIGRAM(S): at 17:40

## 2019-01-05 RX ADMIN — GABAPENTIN 200 MILLIGRAM(S): 400 CAPSULE ORAL at 05:05

## 2019-01-05 RX ADMIN — Medication 1 TABLET(S): at 14:53

## 2019-01-05 RX ADMIN — GABAPENTIN 200 MILLIGRAM(S): 400 CAPSULE ORAL at 22:07

## 2019-01-05 RX ADMIN — Medication 81 MILLIGRAM(S): at 11:48

## 2019-01-05 RX ADMIN — Medication 40 MILLIEQUIVALENT(S): at 11:48

## 2019-01-05 RX ADMIN — Medication 12.5 MILLIGRAM(S): at 05:05

## 2019-01-05 RX ADMIN — Medication 667 MILLIGRAM(S): at 11:47

## 2019-01-05 RX ADMIN — MAGNESIUM OXIDE 400 MG ORAL TABLET 400 MILLIGRAM(S): 241.3 TABLET ORAL at 17:40

## 2019-01-05 RX ADMIN — CALCITRIOL 0.5 MICROGRAM(S): 0.5 CAPSULE ORAL at 11:47

## 2019-01-05 RX ADMIN — Medication 175 MICROGRAM(S): at 05:04

## 2019-01-05 RX ADMIN — SPIRONOLACTONE 50 MILLIGRAM(S): 25 TABLET, FILM COATED ORAL at 05:04

## 2019-01-05 RX ADMIN — PANTOPRAZOLE SODIUM 40 MILLIGRAM(S): 20 TABLET, DELAYED RELEASE ORAL at 05:04

## 2019-01-05 RX ADMIN — CHLORHEXIDINE GLUCONATE 1 APPLICATION(S): 213 SOLUTION TOPICAL at 08:32

## 2019-01-05 RX ADMIN — Medication 200 MILLIGRAM(S): at 05:05

## 2019-01-05 RX ADMIN — Medication 10 MILLIGRAM(S): at 05:04

## 2019-01-05 RX ADMIN — Medication 200 MILLIGRAM(S): at 22:07

## 2019-01-05 RX ADMIN — Medication 1: at 17:39

## 2019-01-05 RX ADMIN — MILRINONE LACTATE 14.76 MICROGRAM(S)/KG/MIN: 1 INJECTION, SOLUTION INTRAVENOUS at 22:08

## 2019-01-05 RX ADMIN — Medication 10 MILLIGRAM(S): at 14:53

## 2019-01-05 RX ADMIN — Medication 1 TABLET(S): at 22:07

## 2019-01-05 RX ADMIN — Medication 100 GRAM(S): at 10:30

## 2019-01-05 RX ADMIN — MAGNESIUM OXIDE 400 MG ORAL TABLET 400 MILLIGRAM(S): 241.3 TABLET ORAL at 08:31

## 2019-01-05 RX ADMIN — Medication 1 TABLET(S): at 05:04

## 2019-01-05 RX ADMIN — Medication 667 MILLIGRAM(S): at 08:31

## 2019-01-05 RX ADMIN — Medication 3: at 11:47

## 2019-01-05 RX ADMIN — GABAPENTIN 200 MILLIGRAM(S): 400 CAPSULE ORAL at 14:52

## 2019-01-05 NOTE — CONSULT NOTE ADULT - SUBJECTIVE AND OBJECTIVE BOX
Requesting Physician : Dr. Robbins/Dr. Rojas    Reason for Consultation: mitral regurgitation/ mitral clip eval    HISTORY OF PRESENT ILLNESS: HPI:  64 y/o post-menopausal F w/ PMHx of Hfref (mod-severe MR, EF 10% 11/2018) s/p ICD  s/p  PICC line for milrinone ,   h/o  thyroid CA s/p resection c/b hypoparathyroidism, HTN, DMII, recent admission for vaginal bleeding  and HF exacerbation , presents for SOB x 1 week , Patient feels dyspneic while walking few feet, clear productive cough, PND, peripheral edema but denied orthopnea. Patient also reported severe abdominal distension causing patient to have intermittent chest pressure. Patient stated she has had 40 pound unintentional weight gain since she was discharged 11/2018. She also reports abdominal cramping. Patient denied fever, chills, abdominal pain, N/V/D, headache, dizziness. Patient reports compliance with diet and medical regimen   No family history of heart disease (01 Jan 2019 23:29)  patient seen/examined. dyspnea slightly improved. no chest pain.      PAST MEDICAL & SURGICAL HISTORY:  Asthma  CHF (congestive heart failure): with EF of 10% s/p AICD  DM (diabetes mellitus)  HTN (hypertension)  Thyroid ca  AICD (automatic cardioverter/defibrillator) present  S/P thyroidectomy          MEDICATIONS:  MEDICATIONS  (STANDING):  aspirin enteric coated 81 milliGRAM(s) Oral daily  buMETAnide Infusion 1 mG/Hr (5 mL/Hr) IV Continuous <Continuous>  calcitriol   Capsule 0.5 MICROGram(s) Oral daily  calcium acetate 667 milliGRAM(s) Oral three times a day with meals  calcium carbonate 1250 mG  + Vitamin D (OsCal 500 + D) 1 Tablet(s) Oral three times a day  chlorhexidine 4% Liquid 1 Application(s) Topical <User Schedule>  dextrose 5%. 1000 milliLiter(s) (50 mL/Hr) IV Continuous <Continuous>  dextrose 50% Injectable 25 Gram(s) IV Push once  gabapentin 200 milliGRAM(s) Oral three times a day  hydrALAZINE 10 milliGRAM(s) Oral every 8 hours  insulin lispro (HumaLOG) corrective regimen sliding scale   SubCutaneous three times a day before meals  insulin lispro (HumaLOG) corrective regimen sliding scale   SubCutaneous at bedtime  levothyroxine 175 MICROGram(s) Oral daily  magnesium oxide 400 milliGRAM(s) Oral two times a day with meals  metolazone 5 milliGRAM(s) Oral daily  metoprolol succinate ER 12.5 milliGRAM(s) Oral daily  milrinone Infusion 0.5 MICROgram(s)/kG/Min (14.76 mL/Hr) IV Continuous <Continuous>  pantoprazole    Tablet 40 milliGRAM(s) Oral before breakfast  potassium chloride    Tablet ER 40 milliEquivalent(s) Oral daily  rifaximin 550 milliGRAM(s) Oral two times a day  spironolactone 50 milliGRAM(s) Oral daily      Allergies    Isordil (Headache)  penicillin (Rash)    Intolerances        FAMILY HISTORY:  No pertinent family history in first degree relatives    Non-contributary for premature coronary disease or sudden cardiac death    SOCIAL HISTORY:    [ ] Non-smoker  [ ] Smoker  [ ] Alcohol      REVIEW OF SYSTEMS:  [o ]chest pain  [y]shortness of breath  [ y ]palpitations  n ]syncope  [ o]near syncope [n ]upper extremity weakness   [ n] lower extremity weakness  [n  ]diplopia  [n  ]altered mental status   [y ]lower extremity edema        [ ] All others negative	  [ ] Unable to obtain    PHYSICAL EXAM:  T(C): 37.1 (01-05-19 @ 21:10), Max: 37.3 (01-05-19 @ 00:16)  HR: 101 (01-05-19 @ 21:10) (100 - 106)  BP: 107/73 (01-05-19 @ 21:10) (97/65 - 115/77)  RR: 18 (01-05-19 @ 21:10) (18 - 18)  SpO2: 97% (01-05-19 @ 21:10) (96% - 98%)  Wt(kg): --  I&O's Summary    04 Jan 2019 07:01  -  05 Jan 2019 07:00  --------------------------------------------------------  IN: 1077.6 mL / OUT: 4300 mL / NET: -3222.4 mL    05 Jan 2019 07:01  -  05 Jan 2019 22:38  --------------------------------------------------------  IN: 960 mL / OUT: 2400 mL / NET: -1440 mL          HEENT:   Normal oral mucosa, PERRL, EOMI	  Lymphatic: No obvious lymphadenopathy , no edema  Cardiovascular: Normal S1 S2, No JVD,  1/6 ROSALINDA murmur , Peripheral pulses palpable 2+ bilaterally  Respiratory: Lungs clear to auscultation, normal effort 	  Gastrointestinal:  Soft, Non-tender, + BS	  Skin: No rashes, No cyanosis, warm to touch  Musculoskeletal: Normal range of motion, normal strength  Psychiatry:  Appropriate Mood & affect     TELEMETRY: 	    	  LABS:	 	    CARDIAC MARKERS:                              9.2    6.41  )-----------( 312      ( 05 Jan 2019 07:56 )             28.9     Hb Trend: 9.2<--    01-05    124<L>  |  81<L>  |  29<H>  ----------------------------<  186<H>  4.2   |  26  |  1.10    Ca    7.3<L>      05 Jan 2019 19:02  Mg     1.6     01-05    TPro  7.8  /  Alb  3.7  /  TBili  4.3<H>  /  DBili  x   /  AST  39  /  ALT  27  /  AlkPhos  321<H>  01-04    Creatinine Trend: 1.10<--, 1.08<--, 1.08<--, 1.08<--, 1.08<--, 1.13<--         ASSESSMENT/PLAN: 	63yFemale with pmhx of chronic systolic heart failure with known moderate to severe MR now with acute on chronic systolic heart failure  1) paitent with moderate to severe MR with severe TR--acute heart failure secondary to MR--?mitral clip--? ANDER--will follow closely--may need ANDER to assess severity of MR  will need to check old evaluation  2) continue diuretic--keep O>I  3) continue tele  4) medical management

## 2019-01-05 NOTE — PROGRESS NOTE ADULT - SUBJECTIVE AND OBJECTIVE BOX
Subjective:  pt seen and examined, no complaints, ROS - .      acetaminophen   Tablet .. 650 milliGRAM(s) Oral every 6 hours PRN  aspirin enteric coated 81 milliGRAM(s) Oral daily  buMETAnide Infusion 1 mG/Hr IV Continuous <Continuous>  calcitriol   Capsule 0.5 MICROGram(s) Oral daily  calcium acetate 667 milliGRAM(s) Oral three times a day with meals  calcium carbonate 1250 mG  + Vitamin D (OsCal 500 + D) 1 Tablet(s) Oral three times a day  chlorhexidine 4% Liquid 1 Application(s) Topical <User Schedule>  dextrose 40% Gel 15 Gram(s) Oral once PRN  dextrose 5%. 1000 milliLiter(s) IV Continuous <Continuous>  dextrose 50% Injectable 25 Gram(s) IV Push once  gabapentin 200 milliGRAM(s) Oral three times a day  glucagon  Injectable 1 milliGRAM(s) IntraMuscular once PRN  guaiFENesin   Syrup  (Sugar-Free) 200 milliGRAM(s) Oral every 6 hours PRN  hydrALAZINE 10 milliGRAM(s) Oral every 8 hours  insulin lispro (HumaLOG) corrective regimen sliding scale   SubCutaneous three times a day before meals  insulin lispro (HumaLOG) corrective regimen sliding scale   SubCutaneous at bedtime  levothyroxine 175 MICROGram(s) Oral daily  magnesium oxide 400 milliGRAM(s) Oral two times a day with meals  metolazone 5 milliGRAM(s) Oral daily  metoprolol succinate ER 12.5 milliGRAM(s) Oral daily  milrinone Infusion 0.5 MICROgram(s)/kG/Min IV Continuous <Continuous>  pantoprazole    Tablet 40 milliGRAM(s) Oral before breakfast  potassium chloride    Tablet ER 40 milliEquivalent(s) Oral daily  rifaximin 550 milliGRAM(s) Oral two times a day  spironolactone 50 milliGRAM(s) Oral daily                            9.6    7.64  )-----------( 284      ( 04 Jan 2019 08:17 )             29.8       Hemoglobin: 9.6 g/dL (01-04 @ 08:17)  Hemoglobin: 9.6 g/dL (01-03 @ 07:33)  Hemoglobin: 10.5 g/dL (01-02 @ 11:27)  Hemoglobin: 8.9 g/dL (01-02 @ 08:11)  Hemoglobin: 9.9 g/dL (01-01 @ 21:33)      01-04    126<L>  |  85<L>  |  24<H>  ----------------------------<  188<H>  4.2   |  23  |  1.08    Ca    6.9<L>      04 Jan 2019 18:48  Mg     1.6     01-04    TPro  7.8  /  Alb  3.7  /  TBili  4.3<H>  /  DBili  x   /  AST  39  /  ALT  27  /  AlkPhos  321<H>  01-04    Creatinine Trend: 1.08<--, 1.08<--, 1.08<--, 1.13<--, 1.24<--, 1.25<--    COAGS:           T(C): 37.3 (01-05-19 @ 05:01), Max: 37.3 (01-05-19 @ 00:16)  HR: 104 (01-05-19 @ 05:01) (99 - 110)  BP: 109/76 (01-05-19 @ 05:01) (103/71 - 133/91)  RR: 18 (01-05-19 @ 05:01) (18 - 18)  SpO2: 97% (01-05-19 @ 05:01) (96% - 100%)  Wt(kg): --    I&O's Summary    04 Jan 2019 07:01  -  05 Jan 2019 07:00  --------------------------------------------------------  IN: 1077.6 mL / OUT: 4300 mL / NET: -3222.4 mL      HEENT:  (-)icterus (-)pallor  CV: N S1 S2 1/6 ROSALINDA (+)2 Pulses B/l  Resp:  Clear to ausculatation B/L, normal effort  GI: (+) BS Soft, NT, ND  Lymph:  (+)Edema, (-)obvious lymphadenopathy  Skin: Warm to touch, Normal turgor  Psych: Appropriate mood and affect        ECG:    A sense V paced	    RADIOLOGY:         CXR:  No infiltartes     ASSESSMENT/PLAN: 	63y Female  F with PMHx of NICM s/p AICD, HTN, moderate-severe MR, reportedly recent LHC at [place] was negative, thyroid cancer s/p thyroidectomy, DM,  admitted with decompensated acute on chronic systolic heart failure.    - ASA,  statin   - tolerating aggressive diuresis with primacor and bumex gtt   - heart failure follow up , cont zaroxlyn , hydralizine , aldactone   - renal follow up , trend creatine   -  GI / DVT prophylaxis,  keep K>4, mag >2.0   - Maintain strict I+O's, standing daily weights   D/W Dr Grimes

## 2019-01-05 NOTE — CONSULT NOTE ADULT - ASSESSMENT
This patient has diffuse bilateral foot/ankle discomfort impeding her ability to ambulate well.   Will order x-rays to rule out any acute osseous pathologies.   If negative, likely that this patient is having mechanical foot/ankle pain due to the significant edema present in her extremities as well as her severe limitation in joint range of motion.   For which, this can be managed as an outpatient and patient advised to follow up upon discharge  If any acute osseous pathology visualized on x-rays - will re-adjust recommendations.   Encouraged to work with PT when able to.

## 2019-01-05 NOTE — CONSULT NOTE ADULT - REASON FOR ADMISSION
acute on chronic systolic heart failure
worsened heart failure

## 2019-01-05 NOTE — CONSULT NOTE ADULT - SUBJECTIVE AND OBJECTIVE BOX
Podiatry pager #: [phone]/ [phone]    Patient is a 63y old  Female who presents with a chief complaint of worsened heart failure (05 Jan 2019 07:47)      HPI:  64 y/o post-menopausal F w/ PMHx of Hfref (mod-severe MR, EF 10% 11/2018) s/p ICD  s/p  PICC line for milrinone ,   h/o  thyroid CA s/p resection c/b hypoparathyroidism, HTN, DMII, recent admission for vaginal bleeding  and HF exacerbation , presents for SOB x 1 week , Patient feels dyspneic while walking few feet, clear productive cough, PND, peripheral edema but denied orthopnea. Patient also reported severe abdominal distension causing patient to have intermittent chest pressure. Patient stated she has had 40 pound unintentional weight gain since she was discharged 11/2018. She also reports abdominal cramping. Patient denied fever, chills, abdominal pain, N/V/D, headache, dizziness. Patient reports compliance with diet and medical regimen   No family history of heart disease (01 Jan 2019 23:29)      Patient has complaint of long standing foot/ankle pain for which she has not had evalauted before. States it can get well controlled with medicine but this only lasts temporary. States that when she develops this pain she is unable to walk without significant discomfort.       PAST MEDICAL & SURGICAL HISTORY:  Asthma  CHF (congestive heart failure): with EF of 10% s/p AICD  DM (diabetes mellitus)  HTN (hypertension)  Thyroid ca  AICD (automatic cardioverter/defibrillator) present  S/P thyroidectomy      MEDICATIONS  (STANDING):  aspirin enteric coated 81 milliGRAM(s) Oral daily  buMETAnide Infusion 1 mG/Hr (5 mL/Hr) IV Continuous <Continuous>  calcitriol   Capsule 0.5 MICROGram(s) Oral daily  calcium acetate 667 milliGRAM(s) Oral three times a day with meals  calcium carbonate 1250 mG  + Vitamin D (OsCal 500 + D) 1 Tablet(s) Oral three times a day  chlorhexidine 4% Liquid 1 Application(s) Topical <User Schedule>  dextrose 5%. 1000 milliLiter(s) (50 mL/Hr) IV Continuous <Continuous>  dextrose 50% Injectable 25 Gram(s) IV Push once  gabapentin 200 milliGRAM(s) Oral three times a day  hydrALAZINE 10 milliGRAM(s) Oral every 8 hours  insulin lispro (HumaLOG) corrective regimen sliding scale   SubCutaneous three times a day before meals  insulin lispro (HumaLOG) corrective regimen sliding scale   SubCutaneous at bedtime  levothyroxine 175 MICROGram(s) Oral daily  magnesium oxide 400 milliGRAM(s) Oral two times a day with meals  metolazone 5 milliGRAM(s) Oral daily  metoprolol succinate ER 12.5 milliGRAM(s) Oral daily  milrinone Infusion 0.5 MICROgram(s)/kG/Min (14.76 mL/Hr) IV Continuous <Continuous>  pantoprazole    Tablet 40 milliGRAM(s) Oral before breakfast  potassium chloride    Tablet ER 40 milliEquivalent(s) Oral daily  rifaximin 550 milliGRAM(s) Oral two times a day  spironolactone 50 milliGRAM(s) Oral daily    MEDICATIONS  (PRN):  acetaminophen   Tablet .. 650 milliGRAM(s) Oral every 6 hours PRN Mild Pain (1 - 3)  dextrose 40% Gel 15 Gram(s) Oral once PRN Blood Glucose LESS THAN 70 milliGRAM(s)/deciliter  glucagon  Injectable 1 milliGRAM(s) IntraMuscular once PRN Glucose LESS THAN 70 milligrams/deciliter  guaiFENesin   Syrup  (Sugar-Free) 200 milliGRAM(s) Oral every 6 hours PRN Cough      Allergies    Isordil (Headache)  penicillin (Rash)    Intolerances        VITALS:    Vital Signs Last 24 Hrs  T(C): 37.3 (05 Jan 2019 05:01), Max: 37.3 (05 Jan 2019 00:16)  T(F): 99.2 (05 Jan 2019 05:01), Max: 99.2 (05 Jan 2019 00:16)  HR: 104 (05 Jan 2019 05:01) (99 - 110)  BP: 109/76 (05 Jan 2019 05:01) (103/71 - 133/91)  BP(mean): --  RR: 18 (05 Jan 2019 05:01) (18 - 18)  SpO2: 97% (05 Jan 2019 05:01) (96% - 100%)    LABS:                          9.6    7.64  )-----------( 284      ( 04 Jan 2019 08:17 )             29.8       01-05    126<L>  |  84<L>  |  26<H>  ----------------------------<  128<H>  3.7   |  25  |  1.08    Ca    6.8<L>      05 Jan 2019 07:08  Mg     1.5     01-05    TPro  7.8  /  Alb  3.7  /  TBili  4.3<H>  /  DBili  x   /  AST  39  /  ALT  27  /  AlkPhos  321<H>  01-04      CAPILLARY BLOOD GLUCOSE      POCT Blood Glucose.: 133 mg/dL (05 Jan 2019 07:15)  POCT Blood Glucose.: 259 mg/dL (04 Jan 2019 21:50)  POCT Blood Glucose.: 164 mg/dL (04 Jan 2019 16:52)  POCT Blood Glucose.: 200 mg/dL (04 Jan 2019 12:08)          LOWER EXTREMITY PHYSICAL EXAM:    Vasular: DP/PT 2/4 bilateral. There is lower extremity edema present bilaterally. Normal capillary refill time  Neuro: Epicritic sensation grossly intact  Musculoskeletal/Ortho: There is diffuse pain on palpation of the hindfoot and dorsal aspect of the foot. There is significant limitation in available ankle joint dorsiflexion. Decreased ROM of the hindfoot joints. No open lesions. No erythema. Edema is diffuse across entire foot, ankle and leg. Non specific.   Skin: Diffuse scaling of skin plantar aspect of both feet.       RADIOLOGY & ADDITIONAL STUDIES:

## 2019-01-05 NOTE — PROGRESS NOTE ADULT - ATTENDING COMMENTS
Agree with above assessment and plan as outlined above.    - cont to keep net negative    Augusto Grimes MD, FACC

## 2019-01-05 NOTE — PROGRESS NOTE ADULT - SUBJECTIVE AND OBJECTIVE BOX
INTERVAL HPI/OVERNIGHT EVENTS: Feeling better.   Vital Signs Last 24 Hrs  T(C): 37 (05 Jan 2019 14:25), Max: 37.3 (05 Jan 2019 00:16)  T(F): 98.6 (05 Jan 2019 14:25), Max: 99.2 (05 Jan 2019 00:16)  HR: 100 (05 Jan 2019 14:25) (99 - 106)  BP: 115/77 (05 Jan 2019 14:51) (97/65 - 128/86)  BP(mean): --  RR: 18 (05 Jan 2019 14:25) (18 - 18)  SpO2: 98% (05 Jan 2019 14:25) (96% - 100%)  I&O's Summary    04 Jan 2019 07:01  -  05 Jan 2019 07:00  --------------------------------------------------------  IN: 1077.6 mL / OUT: 4300 mL / NET: -3222.4 mL    05 Jan 2019 07:01  -  05 Jan 2019 14:55  --------------------------------------------------------  IN: 660 mL / OUT: 1200 mL / NET: -540 mL      MEDICATIONS  (STANDING):  aspirin enteric coated 81 milliGRAM(s) Oral daily  buMETAnide Infusion 1 mG/Hr (5 mL/Hr) IV Continuous <Continuous>  calcitriol   Capsule 0.5 MICROGram(s) Oral daily  calcium acetate 667 milliGRAM(s) Oral three times a day with meals  calcium carbonate 1250 mG  + Vitamin D (OsCal 500 + D) 1 Tablet(s) Oral three times a day  chlorhexidine 4% Liquid 1 Application(s) Topical <User Schedule>  dextrose 5%. 1000 milliLiter(s) (50 mL/Hr) IV Continuous <Continuous>  dextrose 50% Injectable 25 Gram(s) IV Push once  gabapentin 200 milliGRAM(s) Oral three times a day  hydrALAZINE 10 milliGRAM(s) Oral every 8 hours  insulin lispro (HumaLOG) corrective regimen sliding scale   SubCutaneous three times a day before meals  insulin lispro (HumaLOG) corrective regimen sliding scale   SubCutaneous at bedtime  levothyroxine 175 MICROGram(s) Oral daily  magnesium oxide 400 milliGRAM(s) Oral two times a day with meals  metolazone 5 milliGRAM(s) Oral daily  metoprolol succinate ER 12.5 milliGRAM(s) Oral daily  milrinone Infusion 0.5 MICROgram(s)/kG/Min (14.76 mL/Hr) IV Continuous <Continuous>  pantoprazole    Tablet 40 milliGRAM(s) Oral before breakfast  potassium chloride    Tablet ER 40 milliEquivalent(s) Oral daily  rifaximin 550 milliGRAM(s) Oral two times a day  spironolactone 50 milliGRAM(s) Oral daily    MEDICATIONS  (PRN):  acetaminophen   Tablet .. 650 milliGRAM(s) Oral every 6 hours PRN Mild Pain (1 - 3)  dextrose 40% Gel 15 Gram(s) Oral once PRN Blood Glucose LESS THAN 70 milliGRAM(s)/deciliter  glucagon  Injectable 1 milliGRAM(s) IntraMuscular once PRN Glucose LESS THAN 70 milligrams/deciliter  guaiFENesin   Syrup  (Sugar-Free) 200 milliGRAM(s) Oral every 6 hours PRN Cough    LABS:                        9.2    6.41  )-----------( 312      ( 05 Jan 2019 07:56 )             28.9     01-05    126<L>  |  84<L>  |  26<H>  ----------------------------<  128<H>  3.7   |  25  |  1.08    Ca    6.8<L>      05 Jan 2019 07:08  Mg     1.5     01-05    TPro  7.8  /  Alb  3.7  /  TBili  4.3<H>  /  DBili  x   /  AST  39  /  ALT  27  /  AlkPhos  321<H>  01-04        CAPILLARY BLOOD GLUCOSE      POCT Blood Glucose.: 267 mg/dL (05 Jan 2019 11:26)  POCT Blood Glucose.: 133 mg/dL (05 Jan 2019 07:15)  POCT Blood Glucose.: 259 mg/dL (04 Jan 2019 21:50)  POCT Blood Glucose.: 164 mg/dL (04 Jan 2019 16:52)          REVIEW OF SYSTEMS:  CONSTITUTIONAL: No fever, weight loss, or fatigue  EYES: No eye pain, visual disturbances, or discharge  ENMT:  No difficulty hearing, tinnitus, vertigo; No sinus or throat pain  NECK: No pain or stiffness  RESPIRATORY: No cough, wheezing, chills or hemoptysis; No shortness of breath  CARDIOVASCULAR: No chest pain, palpitations, dizziness, or leg swelling  GASTROINTESTINAL: No abdominal or epigastric pain. No nausea, vomiting, or hematemesis; No diarrhea or constipation. No melena or hematochezia.  GENITOURINARY: No dysuria, frequency, hematuria, or incontinence  NEUROLOGICAL: No headaches, memory loss, loss of strength, numbness, or tremors    Consultant(s) Notes Reviewed:  [x ] YES  [ ] NO    PHYSICAL EXAM:  GENERAL: NAD, well-groomed, well-developed, not in any distress ,  HEAD:  Atraumatic, Normocephalic  EYES: EOMI, PERRLA, conjunctiva and sclera clear  ENMT: No tonsillar erythema, exudates, or enlargement; Moist mucous membranes, Good dentition, No lesions  NECK: JVD++  NERVOUS SYSTEM:  Alert & Oriented X3, No focal deficit   CHEST/LUNG: Good air entry bilateral with no  rales, rhonchi, wheezing, or rubs  HEART: Regular rate and rhythm; No murmurs, rubs, or gallops  ABDOMEN: Soft, Nontender, Nondistended; Bowel sounds present  EXTREMITIES:  2+ Peripheral Pulses, No clubbing, cyanosis, but +edema    Care Discussed with Consultants/Other Providers [ x] YES  [ ] NO

## 2019-01-05 NOTE — ED PROCEDURE NOTE - PROCEDURE ADDITIONAL DETAILS
Emergency Department Focused Ultrasound performed at patient's bedside.  The complete report can be found in PACS.  18G in left AC

## 2019-01-05 NOTE — PROGRESS NOTE ADULT - SUBJECTIVE AND OBJECTIVE BOX
EP ATTENDING    tele: sinus tachycardia improving, no significant events    no palpitations, no syncope, less dyspnea    acetaminophen   Tablet .. 650 milliGRAM(s) Oral every 6 hours PRN  aspirin enteric coated 81 milliGRAM(s) Oral daily  buMETAnide Infusion 1 mG/Hr IV Continuous <Continuous>  calcitriol   Capsule 0.5 MICROGram(s) Oral daily  calcium acetate 667 milliGRAM(s) Oral three times a day with meals  calcium carbonate 1250 mG  + Vitamin D (OsCal 500 + D) 1 Tablet(s) Oral three times a day  chlorhexidine 4% Liquid 1 Application(s) Topical <User Schedule>  dextrose 40% Gel 15 Gram(s) Oral once PRN  dextrose 5%. 1000 milliLiter(s) IV Continuous <Continuous>  dextrose 50% Injectable 25 Gram(s) IV Push once  gabapentin 200 milliGRAM(s) Oral three times a day  glucagon  Injectable 1 milliGRAM(s) IntraMuscular once PRN  guaiFENesin   Syrup  (Sugar-Free) 200 milliGRAM(s) Oral every 6 hours PRN  hydrALAZINE 10 milliGRAM(s) Oral every 8 hours  insulin lispro (HumaLOG) corrective regimen sliding scale   SubCutaneous three times a day before meals  insulin lispro (HumaLOG) corrective regimen sliding scale   SubCutaneous at bedtime  levothyroxine 175 MICROGram(s) Oral daily  magnesium oxide 400 milliGRAM(s) Oral two times a day with meals  metolazone 5 milliGRAM(s) Oral daily  metoprolol succinate ER 12.5 milliGRAM(s) Oral daily  milrinone Infusion 0.5 MICROgram(s)/kG/Min IV Continuous <Continuous>  pantoprazole    Tablet 40 milliGRAM(s) Oral before breakfast  potassium chloride    Tablet ER 40 milliEquivalent(s) Oral daily  rifaximin 550 milliGRAM(s) Oral two times a day  spironolactone 50 milliGRAM(s) Oral daily                            9.2    6.41  )-----------( 312      ( 05 Jan 2019 07:56 )             28.9       01-05    126<L>  |  84<L>  |  26<H>  ----------------------------<  128<H>  3.7   |  25  |  1.08    Ca    6.8<L>      05 Jan 2019 07:08  Mg     1.5     01-05    TPro  7.8  /  Alb  3.7  /  TBili  4.3<H>  /  DBili  x   /  AST  39  /  ALT  27  /  AlkPhos  321<H>  01-04            T(C): 37.3 (01-05-19 @ 05:01), Max: 37.3 (01-05-19 @ 00:16)  HR: 104 (01-05-19 @ 05:01) (99 - 106)  BP: 109/76 (01-05-19 @ 05:01) (103/71 - 128/86)  RR: 18 (01-05-19 @ 05:01) (18 - 18)  SpO2: 97% (01-05-19 @ 05:01) (96% - 100%)  Wt(kg): --    JVP 16  RRR, no murmurs  CTAB  soft nt/nd  no c/c, + 1 edema      A/P) She is a pleasant 64 y/o female PMH severe NICM (LVEF 10-15%) who had a [employer]onic Biv-ICD implanted at [place]. A LHC at [place] was also unremarkable. She now returns for with a severe decompensation of her NICM (JVP > 12, LE edema and a near 14 lb weight gain). Her device interrogation recently demonstrates excellent RA, RV and LV lead functions, but with frequent episodes of NSVT. She denies palpitations nor high voltage therapy. EP is now called because she is persistently tachycardic. Review of her EKGs and tele show the rhythm is sinus with adequate BiV-tracking (RBBB in V1, negative in I/L). She hasn't had any more significant NSVT despite milrinone.    -continue toprol   -continue milrinone and bumex  -her sinus tachycardia is a response to milrinone. Given that her device is adequately BiV-tracking her sinus tachycardia I would not readjust her device settings  -no need for AAD  -no need to recheck ICD  -a future option includes turning off BiV pacing to see if she responds better. This can be addressed when she's euvolemic and I will discuss with CHF team  -f/u with [facility] after discharge

## 2019-01-05 NOTE — PROGRESS NOTE ADULT - ASSESSMENT
63F w/ PMHx of Hfref (mod-severe MR, EF 10% 11/2018) s/p ICD  s/p  PICC  on milrinone ,h/o  thyroid CA s/p resection c/b hypoparathyroidism, HTN, DMII, p/w worsening fluid retention      Problem/Plan - 1:  ·  Problem: Acute on Chronic Systolic CHF exacerbation.  Plan: Clinically improving.   -on Milrinone infusion and  Bumex drip .  - Spironolactone, hydralazine and Metolazone   - Heart failure and Cardiology helping.   - strict and outs   - daily weight    Problem/Plan - 2:  ·  Problem: DM (diabetes mellitus).  Plan: -Sugars in acceptable range.  -Hold oral hypoglycemics  -Start HISS, check fsg qac/qhs  -check a1c in am  -consistent carb diet.      Problem/Plan - 3:  ·  Problem: HTN (hypertension).  Plan: Well controlled.      Problem/Plan - 4:  ·  Problem: Hypothyroidism   Plan: - continue levothyroxine   - calcitriol.      Problem/Plan - 5:  ·  Problem: Hypocalcemia & Hyponatremia .  Plan: - Renal helping.   -calcium acetate   - calcium + D.      Problem/Plan - 6:  Problem: Anemia. Plan: HH stable.   no active bleeding   - monitor CBC.     Problem/Plan - 7:  ·  Problem: Elevated liver function tests.  Plan:  2/2 congestive hepatopathy   - continue rifaximin.      Problem/Plan - 8:  ·  Problem: Diabetic neuropathy with Foot Pain .  Plan: - Continue Gabapentin. Podiatry consult noted.      Problem/Plan - 9:  ·  Problem: Need for prophylactic measure.  Plan: - sub q heparin.

## 2019-01-06 LAB
ALBUMIN SERPL ELPH-MCNC: 3.7 G/DL — SIGNIFICANT CHANGE UP (ref 3.3–5)
ALP SERPL-CCNC: 316 U/L — HIGH (ref 40–120)
ALT FLD-CCNC: 24 U/L — SIGNIFICANT CHANGE UP (ref 10–45)
ANION GAP SERPL CALC-SCNC: 17 MMOL/L — SIGNIFICANT CHANGE UP (ref 5–17)
ANION GAP SERPL CALC-SCNC: 17 MMOL/L — SIGNIFICANT CHANGE UP (ref 5–17)
AST SERPL-CCNC: 38 U/L — SIGNIFICANT CHANGE UP (ref 10–40)
BILIRUB SERPL-MCNC: 3.3 MG/DL — HIGH (ref 0.2–1.2)
BUN SERPL-MCNC: 31 MG/DL — HIGH (ref 7–23)
BUN SERPL-MCNC: 32 MG/DL — HIGH (ref 7–23)
CALCIUM SERPL-MCNC: 7 MG/DL — LOW (ref 8.4–10.5)
CALCIUM SERPL-MCNC: 7.2 MG/DL — LOW (ref 8.4–10.5)
CHLORIDE SERPL-SCNC: 80 MMOL/L — LOW (ref 96–108)
CHLORIDE SERPL-SCNC: 82 MMOL/L — LOW (ref 96–108)
CO2 SERPL-SCNC: 27 MMOL/L — SIGNIFICANT CHANGE UP (ref 22–31)
CO2 SERPL-SCNC: 27 MMOL/L — SIGNIFICANT CHANGE UP (ref 22–31)
CREAT SERPL-MCNC: 1.15 MG/DL — SIGNIFICANT CHANGE UP (ref 0.5–1.3)
CREAT SERPL-MCNC: 1.21 MG/DL — SIGNIFICANT CHANGE UP (ref 0.5–1.3)
GLUCOSE BLDC GLUCOMTR-MCNC: 161 MG/DL — HIGH (ref 70–99)
GLUCOSE BLDC GLUCOMTR-MCNC: 163 MG/DL — HIGH (ref 70–99)
GLUCOSE BLDC GLUCOMTR-MCNC: 193 MG/DL — HIGH (ref 70–99)
GLUCOSE BLDC GLUCOMTR-MCNC: 204 MG/DL — HIGH (ref 70–99)
GLUCOSE SERPL-MCNC: 192 MG/DL — HIGH (ref 70–99)
GLUCOSE SERPL-MCNC: 195 MG/DL — HIGH (ref 70–99)
HCT VFR BLD CALC: 29.5 % — LOW (ref 34.5–45)
HGB BLD-MCNC: 9.4 G/DL — LOW (ref 11.5–15.5)
MAGNESIUM SERPL-MCNC: 1.7 MG/DL — SIGNIFICANT CHANGE UP (ref 1.6–2.6)
MAGNESIUM SERPL-MCNC: 1.7 MG/DL — SIGNIFICANT CHANGE UP (ref 1.6–2.6)
MCHC RBC-ENTMCNC: 22 PG — LOW (ref 27–34)
MCHC RBC-ENTMCNC: 31.9 GM/DL — LOW (ref 32–36)
MCV RBC AUTO: 68.9 FL — LOW (ref 80–100)
PHOSPHATE SERPL-MCNC: 6 MG/DL — HIGH (ref 2.5–4.5)
PLATELET # BLD AUTO: 294 K/UL — SIGNIFICANT CHANGE UP (ref 150–400)
POTASSIUM SERPL-MCNC: 3.6 MMOL/L — SIGNIFICANT CHANGE UP (ref 3.5–5.3)
POTASSIUM SERPL-MCNC: 3.9 MMOL/L — SIGNIFICANT CHANGE UP (ref 3.5–5.3)
POTASSIUM SERPL-SCNC: 3.6 MMOL/L — SIGNIFICANT CHANGE UP (ref 3.5–5.3)
POTASSIUM SERPL-SCNC: 3.9 MMOL/L — SIGNIFICANT CHANGE UP (ref 3.5–5.3)
PROT SERPL-MCNC: 7.6 G/DL — SIGNIFICANT CHANGE UP (ref 6–8.3)
RBC # BLD: 4.28 M/UL — SIGNIFICANT CHANGE UP (ref 3.8–5.2)
RBC # FLD: 22.6 % — HIGH (ref 10.3–14.5)
SODIUM SERPL-SCNC: 124 MMOL/L — LOW (ref 135–145)
SODIUM SERPL-SCNC: 126 MMOL/L — LOW (ref 135–145)
WBC # BLD: 6.2 K/UL — SIGNIFICANT CHANGE UP (ref 3.8–10.5)
WBC # FLD AUTO: 6.2 K/UL — SIGNIFICANT CHANGE UP (ref 3.8–10.5)

## 2019-01-06 RX ORDER — GABAPENTIN 400 MG/1
100 CAPSULE ORAL AT BEDTIME
Qty: 0 | Refills: 0 | Status: DISCONTINUED | OUTPATIENT
Start: 2019-01-06 | End: 2019-01-15

## 2019-01-06 RX ORDER — ACETAMINOPHEN 500 MG
1000 TABLET ORAL ONCE
Qty: 0 | Refills: 0 | Status: DISCONTINUED | OUTPATIENT
Start: 2019-01-06 | End: 2019-01-15

## 2019-01-06 RX ORDER — POTASSIUM CHLORIDE 20 MEQ
40 PACKET (EA) ORAL ONCE
Qty: 0 | Refills: 0 | Status: COMPLETED | OUTPATIENT
Start: 2019-01-06 | End: 2019-01-06

## 2019-01-06 RX ADMIN — Medication 1: at 11:36

## 2019-01-06 RX ADMIN — BUMETANIDE 5 MG/HR: 0.25 INJECTION INTRAMUSCULAR; INTRAVENOUS at 23:19

## 2019-01-06 RX ADMIN — Medication 667 MILLIGRAM(S): at 17:21

## 2019-01-06 RX ADMIN — GABAPENTIN 200 MILLIGRAM(S): 400 CAPSULE ORAL at 23:18

## 2019-01-06 RX ADMIN — MAGNESIUM OXIDE 400 MG ORAL TABLET 400 MILLIGRAM(S): 241.3 TABLET ORAL at 08:01

## 2019-01-06 RX ADMIN — Medication 175 MICROGRAM(S): at 05:06

## 2019-01-06 RX ADMIN — Medication 200 MILLIGRAM(S): at 23:18

## 2019-01-06 RX ADMIN — GABAPENTIN 200 MILLIGRAM(S): 400 CAPSULE ORAL at 14:06

## 2019-01-06 RX ADMIN — SPIRONOLACTONE 50 MILLIGRAM(S): 25 TABLET, FILM COATED ORAL at 05:06

## 2019-01-06 RX ADMIN — Medication 1 TABLET(S): at 23:18

## 2019-01-06 RX ADMIN — Medication 12.5 MILLIGRAM(S): at 05:06

## 2019-01-06 RX ADMIN — Medication 40 MILLIEQUIVALENT(S): at 14:07

## 2019-01-06 RX ADMIN — Medication 40 MILLIEQUIVALENT(S): at 10:14

## 2019-01-06 RX ADMIN — Medication 1: at 07:41

## 2019-01-06 RX ADMIN — Medication 1 TABLET(S): at 05:06

## 2019-01-06 RX ADMIN — Medication 1 TABLET(S): at 14:06

## 2019-01-06 RX ADMIN — MAGNESIUM OXIDE 400 MG ORAL TABLET 400 MILLIGRAM(S): 241.3 TABLET ORAL at 17:21

## 2019-01-06 RX ADMIN — Medication 667 MILLIGRAM(S): at 11:36

## 2019-01-06 RX ADMIN — Medication 667 MILLIGRAM(S): at 07:41

## 2019-01-06 RX ADMIN — Medication 81 MILLIGRAM(S): at 11:37

## 2019-01-06 RX ADMIN — GABAPENTIN 100 MILLIGRAM(S): 400 CAPSULE ORAL at 23:18

## 2019-01-06 RX ADMIN — Medication 10 MILLIGRAM(S): at 05:06

## 2019-01-06 RX ADMIN — Medication 10 MILLIGRAM(S): at 23:18

## 2019-01-06 RX ADMIN — CALCITRIOL 0.5 MICROGRAM(S): 0.5 CAPSULE ORAL at 11:36

## 2019-01-06 RX ADMIN — Medication 1: at 17:21

## 2019-01-06 RX ADMIN — Medication 10 MILLIGRAM(S): at 14:07

## 2019-01-06 RX ADMIN — PANTOPRAZOLE SODIUM 40 MILLIGRAM(S): 20 TABLET, DELAYED RELEASE ORAL at 05:07

## 2019-01-06 RX ADMIN — CHLORHEXIDINE GLUCONATE 1 APPLICATION(S): 213 SOLUTION TOPICAL at 07:41

## 2019-01-06 RX ADMIN — MILRINONE LACTATE 14.76 MICROGRAM(S)/KG/MIN: 1 INJECTION, SOLUTION INTRAVENOUS at 23:19

## 2019-01-06 RX ADMIN — Medication 200 MILLIGRAM(S): at 05:07

## 2019-01-06 RX ADMIN — GABAPENTIN 200 MILLIGRAM(S): 400 CAPSULE ORAL at 05:06

## 2019-01-06 NOTE — PROGRESS NOTE ADULT - SUBJECTIVE AND OBJECTIVE BOX
EP ATTENDING    tele: sinus tachycardia improving, no significant events    no palpitations, no syncope, less dyspnea    acetaminophen   Tablet .. 650 milliGRAM(s) Oral every 6 hours PRN  acetaminophen  IVPB .. 1000 milliGRAM(s) IV Intermittent once  aspirin enteric coated 81 milliGRAM(s) Oral daily  buMETAnide Infusion 1 mG/Hr IV Continuous <Continuous>  calcitriol   Capsule 0.5 MICROGram(s) Oral daily  calcium acetate 667 milliGRAM(s) Oral three times a day with meals  calcium carbonate 1250 mG  + Vitamin D (OsCal 500 + D) 1 Tablet(s) Oral three times a day  chlorhexidine 4% Liquid 1 Application(s) Topical <User Schedule>  dextrose 40% Gel 15 Gram(s) Oral once PRN  dextrose 5%. 1000 milliLiter(s) IV Continuous <Continuous>  dextrose 50% Injectable 25 Gram(s) IV Push once  gabapentin 200 milliGRAM(s) Oral three times a day  glucagon  Injectable 1 milliGRAM(s) IntraMuscular once PRN  guaiFENesin   Syrup  (Sugar-Free) 200 milliGRAM(s) Oral every 6 hours PRN  hydrALAZINE 10 milliGRAM(s) Oral every 8 hours  insulin lispro (HumaLOG) corrective regimen sliding scale   SubCutaneous three times a day before meals  insulin lispro (HumaLOG) corrective regimen sliding scale   SubCutaneous at bedtime  levothyroxine 175 MICROGram(s) Oral daily  magnesium oxide 400 milliGRAM(s) Oral two times a day with meals  metolazone 5 milliGRAM(s) Oral daily  metoprolol succinate ER 12.5 milliGRAM(s) Oral daily  milrinone Infusion 0.5 MICROgram(s)/kG/Min IV Continuous <Continuous>  pantoprazole    Tablet 40 milliGRAM(s) Oral before breakfast  potassium chloride    Tablet ER 40 milliEquivalent(s) Oral daily  rifaximin 550 milliGRAM(s) Oral two times a day  spironolactone 50 milliGRAM(s) Oral daily                            9.4    6.20  )-----------( 294      ( 06 Jan 2019 06:08 )             29.5       01-06    126<L>  |  82<L>  |  31<H>  ----------------------------<  192<H>  3.6   |  27  |  1.15    Ca    7.0<L>      06 Jan 2019 05:21  Phos  6.0     01-06  Mg     1.7     01-06    TPro  7.6  /  Alb  3.7  /  TBili  3.3<H>  /  DBili  x   /  AST  38  /  ALT  24  /  AlkPhos  316<H>  01-06        T(C): 36.5 (01-06-19 @ 04:46), Max: 37.1 (01-05-19 @ 21:10)  HR: 96 (01-06-19 @ 09:00) (96 - 106)  BP: 104/74 (01-06-19 @ 09:00) (97/65 - 115/77)  RR: 18 (01-06-19 @ 04:46) (18 - 18)  SpO2: 99% (01-06-19 @ 04:46) (97% - 99%)  Wt(kg): --    JVP 16  RRR, no murmurs  CTAB  soft nt/nd  no c/c, + 1 edema      A/P) She is a pleasant 62 y/o female PMH severe NICM (LVEF 10-15%) who had a Med[employer]onic Biv-ICD implanted at [place]. A LHC at [place] was also unremarkable. She now returns for with a severe decompensation of her NICM (JVP > 12, LE edema and a near 14 lb weight gain). Her device interrogation recently demonstrates excellent RA, RV and LV lead functions, but with frequent episodes of NSVT. She denies palpitations nor high voltage therapy. EP is now called because she is persistently tachycardic. Review of her EKGs and tele show the rhythm is sinus with adequate BiV-tracking (RBBB in V1, negative in I/L). She hasn't had any more significant NSVT despite milrinone.    -continue toprol   -continue milrinone and bumex  -her sinus tachycardia is a response to milrinone. Given that her device is adequately BiV-tracking her sinus tachycardia I would not readjust her device settings  -no need for AAD  -no need to recheck ICD  -a future option includes turning off BiV pacing to see if she responds better. This can be addressed when she's euvolemic and I will discuss with CHF team  -f/u with [facility] after discharge   -would repeat echo when she is euvolemic to see if she would benefit from a TV repair

## 2019-01-06 NOTE — PROGRESS NOTE ADULT - ASSESSMENT
ASSESSMENT/RECOMMEND  A 63-year-old female with past medical history of hypertension, diabetes, chronic kidney disease stage III, presents with grossly volume overloaded state.  She has hypervolemic, hyponatremia and will need aggressive diuresis.  Hypocalcemia and hypoparathyroidism    Cardiology.  On Bumex drip with good urine output; on primacor gtt  Renal.  Simply trend her serum sodium; no need for Samsca at present.  Avoid excessive amounts of liquids without calories.  Encourage protein intake. On KCl 40 meq PO daily, was given additional dose this AM   Endo.  Continue with Synthroid.  Once she is out of heart failure, the dose of Synthroid may need to be adjusted.  Unclear how much medication she is absorbing as she has edema as well.    Geno Carver NP  [facility]  [phone]

## 2019-01-06 NOTE — PROGRESS NOTE ADULT - SUBJECTIVE AND OBJECTIVE BOX
INTERVAL HPI/OVERNIGHT EVENTS: My feet hurting at night more .   Vital Signs Last 24 Hrs  T(C): 36.5 (06 Jan 2019 04:46), Max: 37.1 (05 Jan 2019 21:10)  T(F): 97.7 (06 Jan 2019 04:46), Max: 98.8 (05 Jan 2019 21:10)  HR: 96 (06 Jan 2019 09:00) (96 - 106)  BP: 104/74 (06 Jan 2019 09:00) (97/65 - 115/77)  BP(mean): --  RR: 18 (06 Jan 2019 04:46) (18 - 18)  SpO2: 99% (06 Jan 2019 04:46) (97% - 99%)  I&O's Summary    05 Jan 2019 07:01  -  06 Jan 2019 07:00  --------------------------------------------------------  IN: 1197.6 mL / OUT: 4200 mL / NET: -3002.4 mL    06 Jan 2019 07:01  -  06 Jan 2019 12:13  --------------------------------------------------------  IN: 400 mL / OUT: 200 mL / NET: 200 mL      MEDICATIONS  (STANDING):  acetaminophen  IVPB .. 1000 milliGRAM(s) IV Intermittent once  aspirin enteric coated 81 milliGRAM(s) Oral daily  buMETAnide Infusion 1 mG/Hr (5 mL/Hr) IV Continuous <Continuous>  calcitriol   Capsule 0.5 MICROGram(s) Oral daily  calcium acetate 667 milliGRAM(s) Oral three times a day with meals  calcium carbonate 1250 mG  + Vitamin D (OsCal 500 + D) 1 Tablet(s) Oral three times a day  chlorhexidine 4% Liquid 1 Application(s) Topical <User Schedule>  dextrose 5%. 1000 milliLiter(s) (50 mL/Hr) IV Continuous <Continuous>  dextrose 50% Injectable 25 Gram(s) IV Push once  gabapentin 200 milliGRAM(s) Oral three times a day  hydrALAZINE 10 milliGRAM(s) Oral every 8 hours  insulin lispro (HumaLOG) corrective regimen sliding scale   SubCutaneous three times a day before meals  insulin lispro (HumaLOG) corrective regimen sliding scale   SubCutaneous at bedtime  levothyroxine 175 MICROGram(s) Oral daily  magnesium oxide 400 milliGRAM(s) Oral two times a day with meals  metolazone 5 milliGRAM(s) Oral daily  metoprolol succinate ER 12.5 milliGRAM(s) Oral daily  milrinone Infusion 0.5 MICROgram(s)/kG/Min (14.76 mL/Hr) IV Continuous <Continuous>  pantoprazole    Tablet 40 milliGRAM(s) Oral before breakfast  potassium chloride    Tablet ER 40 milliEquivalent(s) Oral daily  rifaximin 550 milliGRAM(s) Oral two times a day  spironolactone 50 milliGRAM(s) Oral daily    MEDICATIONS  (PRN):  acetaminophen   Tablet .. 650 milliGRAM(s) Oral every 6 hours PRN Mild Pain (1 - 3)  dextrose 40% Gel 15 Gram(s) Oral once PRN Blood Glucose LESS THAN 70 milliGRAM(s)/deciliter  glucagon  Injectable 1 milliGRAM(s) IntraMuscular once PRN Glucose LESS THAN 70 milligrams/deciliter  guaiFENesin   Syrup  (Sugar-Free) 200 milliGRAM(s) Oral every 6 hours PRN Cough    LABS:                        9.4    6.20  )-----------( 294      ( 06 Jan 2019 06:08 )             29.5     01-06    126<L>  |  82<L>  |  31<H>  ----------------------------<  192<H>  3.6   |  27  |  1.15    Ca    7.0<L>      06 Jan 2019 05:21  Phos  6.0     01-06  Mg     1.7     01-06    TPro  7.6  /  Alb  3.7  /  TBili  3.3<H>  /  DBili  x   /  AST  38  /  ALT  24  /  AlkPhos  316<H>  01-06        CAPILLARY BLOOD GLUCOSE      POCT Blood Glucose.: 193 mg/dL (06 Jan 2019 11:15)  POCT Blood Glucose.: 163 mg/dL (06 Jan 2019 07:17)  POCT Blood Glucose.: 221 mg/dL (05 Jan 2019 22:01)  POCT Blood Glucose.: 154 mg/dL (05 Jan 2019 17:10)          REVIEW OF SYSTEMS:  CONSTITUTIONAL: No fever, weight loss, or fatigue  EYES: No eye pain, visual disturbances, or discharge  RESPIRATORY: No cough, wheezing, chills or hemoptysis; No shortness of breath  CARDIOVASCULAR: No chest pain, palpitations, dizziness, or leg swelling  GASTROINTESTINAL: No abdominal or epigastric pain. No nausea, vomiting, or hematemesis; No diarrhea or constipation. No melena or hematochezia.  GENITOURINARY: No dysuria, frequency, hematuria, or incontinence  NEUROLOGICAL: No headaches, memory loss, loss of strength, numbness, or tremors    MUSCULOSKELETAL: feet pain     Consultant(s) Notes Reviewed:  [x ] YES  [ ] NO    PHYSICAL EXAM:  GENERAL: NAD, well-groomed, well-developed ,not in any distress ,  HEAD:  Atraumatic, Normocephalic  EYES: EOMI, PERRLA, conjunctiva and sclera clear  ENMT: No tonsillar erythema, exudates, or enlargement; Moist mucous membranes, Good dentition, No lesions  NECK: JVD++  NERVOUS SYSTEM:  Alert & Oriented X3, No focal deficit   CHEST/LUNG: Good air entry bilateral with no  rales, rhonchi, wheezing, or rubs  HEART: Regular rate and rhythm; No murmurs, rubs, or gallops  ABDOMEN: Soft, Nontender, Nondistended; Bowel sounds present  EXTREMITIES:  2+ Peripheral Pulses, No clubbing, cyanosis, or edema    Care Discussed with Consultants/Other Providers [ x] YES  [ ] NO

## 2019-01-06 NOTE — PROGRESS NOTE ADULT - SUBJECTIVE AND OBJECTIVE BOX
NEPHROLOGY - NSN      Patient seen and examined. Pt seen sitting in bed, reports feeling better, no complaints offered.     MEDICATIONS  (STANDING):  acetaminophen  IVPB .. 1000 milliGRAM(s) IV Intermittent once  aspirin enteric coated 81 milliGRAM(s) Oral daily  buMETAnide Infusion 1 mG/Hr (5 mL/Hr) IV Continuous <Continuous>  calcitriol   Capsule 0.5 MICROGram(s) Oral daily  calcium acetate 667 milliGRAM(s) Oral three times a day with meals  calcium carbonate 1250 mG  + Vitamin D (OsCal 500 + D) 1 Tablet(s) Oral three times a day  chlorhexidine 4% Liquid 1 Application(s) Topical <User Schedule>  dextrose 5%. 1000 milliLiter(s) (50 mL/Hr) IV Continuous <Continuous>  dextrose 50% Injectable 25 Gram(s) IV Push once  gabapentin 200 milliGRAM(s) Oral three times a day  gabapentin 100 milliGRAM(s) Oral at bedtime  hydrALAZINE 10 milliGRAM(s) Oral every 8 hours  insulin lispro (HumaLOG) corrective regimen sliding scale   SubCutaneous three times a day before meals  insulin lispro (HumaLOG) corrective regimen sliding scale   SubCutaneous at bedtime  levothyroxine 175 MICROGram(s) Oral daily  magnesium oxide 400 milliGRAM(s) Oral two times a day with meals  metolazone 5 milliGRAM(s) Oral daily  metoprolol succinate ER 12.5 milliGRAM(s) Oral daily  milrinone Infusion 0.5 MICROgram(s)/kG/Min (14.76 mL/Hr) IV Continuous <Continuous>  pantoprazole    Tablet 40 milliGRAM(s) Oral before breakfast  potassium chloride    Tablet ER 40 milliEquivalent(s) Oral daily  rifaximin 550 milliGRAM(s) Oral two times a day  spironolactone 50 milliGRAM(s) Oral daily    VITALS:  T(C): , Max: 37.1 (01-05-19 @ 21:10)  T(F): , Max: 98.8 (01-05-19 @ 21:10)  HR: 100 (01-06-19 @ 14:04)  BP: 117/78 (01-06-19 @ 14:04)  RR: 18 (01-06-19 @ 14:04)  SpO2: 97% (01-06-19 @ 14:04)    I and O's:    01-05 @ 07:01 - 01-06 @ 07:00  --------------------------------------------------------  IN: 1197.6 mL / OUT: 4200 mL / NET: -3002.4 mL    01-06 @ 07:01 - 01-06 @ 14:49  --------------------------------------------------------  IN: 820 mL / OUT: 500 mL / NET: 320 mL    REVIEW OF SYSTEMS:  Full ROS done and were negative unless otherwise indicated in HPI/assessment.     PHYSICAL EXAM:  Constitutional: NAD  Respiratory: CTA B/L  Cardiovascular: S1 and S2, RRR  Gastrointestinal: + BS, soft, NT, ND  Extremities: + peripheral edema   Neurological: AAO x 3, CN 2-12 intact  : No Gustafson  Access: Not applicable    LABS:                        9.4    6.20  )-----------( 294      ( 06 Jan 2019 06:08 )             29.5     01-06    126<L>  |  82<L>  |  31<H>  ----------------------------<  192<H>  3.6   |  27  |  1.15    Ca    7.0<L>      06 Jan 2019 05:21  Phos  6.0     01-06  Mg     1.7     01-06    TPro  7.6  /  Alb  3.7  /  TBili  3.3<H>  /  DBili  x   /  AST  38  /  ALT  24  /  AlkPhos  316<H>  01-06

## 2019-01-06 NOTE — PROGRESS NOTE ADULT - ASSESSMENT
63F w/ PMHx of Hfref (mod-severe MR, EF 10% 11/2018) s/p ICD  s/p  PICC  on milrinone ,h/o  thyroid CA s/p resection c/b hypoparathyroidism, HTN, DMII, p/w worsening fluid retention      Problem/Plan - 1:  ·  Problem: Acute on Chronic Systolic CHF exacerbation.  Plan: Clinically improving.   -on Milrinone infusion and  Bumex drip .  - Spironolactone, hydralazine and Metolazone   - Heart failure and Cardiology helping.   - strict and outs   - daily weight    Problem/Plan - 2:  ·  Problem: DM (diabetes mellitus).  Plan: -Sugars in acceptable range.  -Hold oral hypoglycemics  -Start HISS, check fsg qac/qhs  -check a1c in am  -consistent carb diet.      Problem/Plan - 3:  ·  Problem: HTN (hypertension).  Plan: Well controlled.      Problem/Plan - 4:  ·  Problem: Hypothyroidism   Plan: - continue levothyroxine   - calcitriol.      Problem/Plan - 5:  ·  Problem: Hypocalcemia & Hyponatremia .  Plan: - Renal helping.   -calcium acetate   - calcium + D.      Problem/Plan - 6:  Problem: Anemia. Plan: HH stable.   no active bleeding   - monitor CBC.     Problem/Plan - 7:  ·  Problem: Elevated liver function tests.  Plan:  2/2 congestive hepatopathy   - continue rifaximin.      Problem/Plan - 8:  ·  Problem: Diabetic neuropathy with Foot Pain .  Plan: - Continue Gabapentin and added extra dose at Bed time. . Podiatry consult noted.      Problem/Plan - 9:  ·  Problem: Need for prophylactic measure.  Plan: - sub q heparin.

## 2019-01-06 NOTE — PROGRESS NOTE ADULT - ATTENDING COMMENTS
Agree with above assessment and plan as outlined above.    - cont inotrope assissted diuresis    Augusto Grimes MD, FACC

## 2019-01-06 NOTE — PROGRESS NOTE ADULT - SUBJECTIVE AND OBJECTIVE BOX
SUBJECTIVE: No CP or SOB      MEDICATIONS  (STANDING):  acetaminophen  IVPB .. 1000 milliGRAM(s) IV Intermittent once  aspirin enteric coated 81 milliGRAM(s) Oral daily  buMETAnide Infusion 1 mG/Hr (5 mL/Hr) IV Continuous <Continuous>  calcitriol   Capsule 0.5 MICROGram(s) Oral daily  calcium acetate 667 milliGRAM(s) Oral three times a day with meals  calcium carbonate 1250 mG  + Vitamin D (OsCal 500 + D) 1 Tablet(s) Oral three times a day  chlorhexidine 4% Liquid 1 Application(s) Topical <User Schedule>  dextrose 5%. 1000 milliLiter(s) (50 mL/Hr) IV Continuous <Continuous>  dextrose 50% Injectable 25 Gram(s) IV Push once  gabapentin 200 milliGRAM(s) Oral three times a day  gabapentin 100 milliGRAM(s) Oral at bedtime  hydrALAZINE 10 milliGRAM(s) Oral every 8 hours  insulin lispro (HumaLOG) corrective regimen sliding scale   SubCutaneous three times a day before meals  insulin lispro (HumaLOG) corrective regimen sliding scale   SubCutaneous at bedtime  levothyroxine 175 MICROGram(s) Oral daily  magnesium oxide 400 milliGRAM(s) Oral two times a day with meals  metolazone 5 milliGRAM(s) Oral daily  metoprolol succinate ER 12.5 milliGRAM(s) Oral daily  milrinone Infusion 0.5 MICROgram(s)/kG/Min (14.76 mL/Hr) IV Continuous <Continuous>  pantoprazole    Tablet 40 milliGRAM(s) Oral before breakfast  potassium chloride    Tablet ER 40 milliEquivalent(s) Oral daily  rifaximin 550 milliGRAM(s) Oral two times a day  spironolactone 50 milliGRAM(s) Oral daily    LABS:                        9.4    6.20  )-----------( 294      ( 06 Jan 2019 06:08 )             29.5     126<L>  |  82<L>  |  31<H>  ----------------------------<  192<H>  3.6   |  27  |  1.15    Ca    7.0<L>      06 Jan 2019 05:21  Phos  6.0     01-06  Mg     1.7     01-06    TPro  7.6  /  Alb  3.7  /  TBili  3.3<H>  /  DBili  x   /  AST  38  /  ALT  24  /  AlkPhos  316<H>  01-06    PHYSICAL EXAM:  Vital Signs Last 24 Hrs  T(C): 36.8 (06 Jan 2019 14:04), Max: 37.1 (05 Jan 2019 21:10)  T(F): 98.3 (06 Jan 2019 14:04), Max: 98.8 (05 Jan 2019 21:10)  HR: 100 (06 Jan 2019 14:04) (96 - 106)  BP: 117/78 (06 Jan 2019 14:04) (104/74 - 117/78)  RR: 18 (06 Jan 2019 14:04) (18 - 18)  SpO2: 97% (06 Jan 2019 14:04) (97% - 99%)    Cardiovascular:  S1S2 RRR, No JVD  Respiratory: Lungs clear to auscultation, normal effort  Gastrointestinal: Abdomen soft, ND, NT, +BS  Skin: Warm, dry, intact. No rash.  Musculoskeletal: Normal ROM, normal strength  Ext: 2-3+ LE edema    DIAGNOSTIC DATA    < from: TTE with Doppler (w/Cont) (11.07.18 @ 05:53) >  Conclusions:  1. Tethered mitral valve leaflets with normal opening.  Mitral annular calcification and thickened  mitral leaflet  tips Moderate mitral regurgitation.  2. Calcified trileaflet aortic valve with normal opening.  3. Moderately dilated left atrium.  LA volume index = 43  cc/m2.  4. Eccentric left ventricular hypertrophy (dilated left  ventricle with normal relative wall thickness).  5. Severe global left ventricular systolic dysfunction.  Endocardial visualization enhanced with intravenous  injection of Ultrasonic Enhancing Agent (Definity). No LV  thrombus.  Septal flattening consistent with right  ventricular overload.  6. Severe diastolic dysfunction with elevated filling  pressures  7. Severe right atrial enlargement.  8. Right ventricular enlargement with decreased right  ventricular systolic function.  A device wire is noted in  the right heart.  9. Dilated tricuspid annulus with malcoaptation of  tricuspid leaflets. Severe tricuspid regurgitation.  *** No previous Echo exam.  ------------------------------------------------------------------------  Confirmed on  11/7/2018 - 16:40:23 by Haley Koch M.D.    < end of copied text >    ASSESSMENT AND PLAN:  62 y/o F with PMHx of NICM s/p AICD, HTN, moderate-severe MR, reportedly recent LHC at [place] was negative, thyroid cancer s/p thyroidectomy, DM,  admitted with decompensated acute on chronic systolic heart failure.      1) patient with moderate to severe MR with severe TR--acute heart failure secondary to MR--?mitral clip--? ANDER--will follow closely--may need ANDER to assess severity of MR once euvolemic  2) IV diuresis  3) CHF following  3) continue tele

## 2019-01-06 NOTE — PROGRESS NOTE ADULT - ATTENDING COMMENTS
agree with above  continue with inotrope assisted diuresis per cardiology  no urgent ischemic eval needed at this time    Kunal Muller MD

## 2019-01-06 NOTE — PROGRESS NOTE ADULT - SUBJECTIVE AND OBJECTIVE BOX
Subjective:  pt seen and examined, no complaints, ROS - .    acetaminophen   Tablet .. 650 milliGRAM(s) Oral every 6 hours PRN  aspirin enteric coated 81 milliGRAM(s) Oral daily  buMETAnide Infusion 1 mG/Hr IV Continuous <Continuous>  calcitriol   Capsule 0.5 MICROGram(s) Oral daily  calcium acetate 667 milliGRAM(s) Oral three times a day with meals  calcium carbonate 1250 mG  + Vitamin D (OsCal 500 + D) 1 Tablet(s) Oral three times a day  chlorhexidine 4% Liquid 1 Application(s) Topical <User Schedule>  dextrose 40% Gel 15 Gram(s) Oral once PRN  dextrose 5%. 1000 milliLiter(s) IV Continuous <Continuous>  dextrose 50% Injectable 25 Gram(s) IV Push once  gabapentin 200 milliGRAM(s) Oral three times a day  glucagon  Injectable 1 milliGRAM(s) IntraMuscular once PRN  guaiFENesin   Syrup  (Sugar-Free) 200 milliGRAM(s) Oral every 6 hours PRN  hydrALAZINE 10 milliGRAM(s) Oral every 8 hours  insulin lispro (HumaLOG) corrective regimen sliding scale   SubCutaneous three times a day before meals  insulin lispro (HumaLOG) corrective regimen sliding scale   SubCutaneous at bedtime  levothyroxine 175 MICROGram(s) Oral daily  magnesium oxide 400 milliGRAM(s) Oral two times a day with meals  metolazone 5 milliGRAM(s) Oral daily  metoprolol succinate ER 12.5 milliGRAM(s) Oral daily  milrinone Infusion 0.5 MICROgram(s)/kG/Min IV Continuous <Continuous>  pantoprazole    Tablet 40 milliGRAM(s) Oral before breakfast  potassium chloride    Tablet ER 40 milliEquivalent(s) Oral daily  rifaximin 550 milliGRAM(s) Oral two times a day  spironolactone 50 milliGRAM(s) Oral daily                            9.2    6.41  )-----------( 312      ( 05 Jan 2019 07:56 )             28.9       Hemoglobin: 9.2 g/dL (01-05 @ 07:56)  Hemoglobin: 9.6 g/dL (01-04 @ 08:17)  Hemoglobin: 9.6 g/dL (01-03 @ 07:33)  Hemoglobin: 10.5 g/dL (01-02 @ 11:27)  Hemoglobin: 8.9 g/dL (01-02 @ 08:11)      01-05    124<L>  |  81<L>  |  29<H>  ----------------------------<  186<H>  4.2   |  26  |  1.10    Ca    7.3<L>      05 Jan 2019 19:02  Mg     1.6     01-05    TPro  7.8  /  Alb  3.7  /  TBili  4.3<H>  /  DBili  x   /  AST  39  /  ALT  27  /  AlkPhos  321<H>  01-04    Creatinine Trend: 1.10<--, 1.08<--, 1.08<--, 1.08<--, 1.08<--, 1.13<--    COAGS:           T(C): 36.6 (01-06-19 @ 00:14), Max: 37.3 (01-05-19 @ 05:01)  HR: 100 (01-06-19 @ 00:14) (100 - 104)  BP: 110/71 (01-06-19 @ 00:14) (97/65 - 115/77)  RR: 18 (01-06-19 @ 00:14) (18 - 18)  SpO2: 99% (01-06-19 @ 00:14) (97% - 99%)  Wt(kg): --    I&O's Summary    04 Jan 2019 07:01  -  05 Jan 2019 07:00  --------------------------------------------------------  IN: 1077.6 mL / OUT: 4300 mL / NET: -3222.4 mL    05 Jan 2019 07:01  -  06 Jan 2019 04:11  --------------------------------------------------------  IN: 960 mL / OUT: 2400 mL / NET: -1440 mL        HEENT:  (-)icterus (-)pallor  CV: N S1 S2 1/6 ROSALINDA (+)2 Pulses B/l  Resp:  Clear to ausculatation B/L, normal effort  GI: (+) BS Soft, NT, ND  Lymph:  (+)Edema, (-)obvious lymphadenopathy  Skin: Warm to touch, Normal turgor  Psych: Appropriate mood and affect        ECG:    A sense V paced	    RADIOLOGY:         CXR:  No infiltartes     ASSESSMENT/PLAN: 	63y Female  F with PMHx of NICM s/p AICD, HTN, moderate-severe MR, reportedly recent LHC at [place] was negative, thyroid cancer s/p thyroidectomy, DM,  admitted with decompensated acute on chronic systolic heart failure.    - ASA,  statin   - tolerating aggressive diuresis with primacor and bumex gtt   - pt tolerating low dose BB with primacor gtt   - heart failure follow up   - cont zaroxlyn , hydralizine , aldactone   - renal follow up , trend creatine   -  GI / DVT prophylaxis,  keep K>4, mag >2.0   - Maintain strict I+O's, standing daily weights   D/W Dr Grimes

## 2019-01-07 LAB
ANION GAP SERPL CALC-SCNC: 18 MMOL/L — HIGH (ref 5–17)
ANION GAP SERPL CALC-SCNC: 18 MMOL/L — HIGH (ref 5–17)
BUN SERPL-MCNC: 34 MG/DL — HIGH (ref 7–23)
BUN SERPL-MCNC: 35 MG/DL — HIGH (ref 7–23)
CALCIUM SERPL-MCNC: 7.3 MG/DL — LOW (ref 8.4–10.5)
CALCIUM SERPL-MCNC: 7.4 MG/DL — LOW (ref 8.4–10.5)
CHLORIDE SERPL-SCNC: 78 MMOL/L — LOW (ref 96–108)
CHLORIDE SERPL-SCNC: 81 MMOL/L — LOW (ref 96–108)
CO2 SERPL-SCNC: 30 MMOL/L — SIGNIFICANT CHANGE UP (ref 22–31)
CO2 SERPL-SCNC: 30 MMOL/L — SIGNIFICANT CHANGE UP (ref 22–31)
CREAT SERPL-MCNC: 1.09 MG/DL — SIGNIFICANT CHANGE UP (ref 0.5–1.3)
CREAT SERPL-MCNC: 1.09 MG/DL — SIGNIFICANT CHANGE UP (ref 0.5–1.3)
GLUCOSE BLDC GLUCOMTR-MCNC: 133 MG/DL — HIGH (ref 70–99)
GLUCOSE BLDC GLUCOMTR-MCNC: 218 MG/DL — HIGH (ref 70–99)
GLUCOSE BLDC GLUCOMTR-MCNC: 236 MG/DL — HIGH (ref 70–99)
GLUCOSE BLDC GLUCOMTR-MCNC: 266 MG/DL — HIGH (ref 70–99)
GLUCOSE SERPL-MCNC: 120 MG/DL — HIGH (ref 70–99)
GLUCOSE SERPL-MCNC: 253 MG/DL — HIGH (ref 70–99)
HCT VFR BLD CALC: 29.8 % — LOW (ref 34.5–45)
HGB BLD-MCNC: 9.6 G/DL — LOW (ref 11.5–15.5)
MAGNESIUM SERPL-MCNC: 1.6 MG/DL — SIGNIFICANT CHANGE UP (ref 1.6–2.6)
MAGNESIUM SERPL-MCNC: 1.6 MG/DL — SIGNIFICANT CHANGE UP (ref 1.6–2.6)
MCHC RBC-ENTMCNC: 22 PG — LOW (ref 27–34)
MCHC RBC-ENTMCNC: 32.2 GM/DL — SIGNIFICANT CHANGE UP (ref 32–36)
MCV RBC AUTO: 68.2 FL — LOW (ref 80–100)
OB PNL STL: NEGATIVE — SIGNIFICANT CHANGE UP
PHOSPHATE SERPL-MCNC: 5.9 MG/DL — HIGH (ref 2.5–4.5)
PLATELET # BLD AUTO: 282 K/UL — SIGNIFICANT CHANGE UP (ref 150–400)
POTASSIUM SERPL-MCNC: 3.1 MMOL/L — LOW (ref 3.5–5.3)
POTASSIUM SERPL-MCNC: 3.5 MMOL/L — SIGNIFICANT CHANGE UP (ref 3.5–5.3)
POTASSIUM SERPL-SCNC: 3.1 MMOL/L — LOW (ref 3.5–5.3)
POTASSIUM SERPL-SCNC: 3.5 MMOL/L — SIGNIFICANT CHANGE UP (ref 3.5–5.3)
RBC # BLD: 4.37 M/UL — SIGNIFICANT CHANGE UP (ref 3.8–5.2)
RBC # FLD: 22.5 % — HIGH (ref 10.3–14.5)
SODIUM SERPL-SCNC: 126 MMOL/L — LOW (ref 135–145)
SODIUM SERPL-SCNC: 129 MMOL/L — LOW (ref 135–145)
WBC # BLD: 6.22 K/UL — SIGNIFICANT CHANGE UP (ref 3.8–10.5)
WBC # FLD AUTO: 6.22 K/UL — SIGNIFICANT CHANGE UP (ref 3.8–10.5)

## 2019-01-07 RX ORDER — MAGNESIUM SULFATE 500 MG/ML
1 VIAL (ML) INJECTION ONCE
Qty: 0 | Refills: 0 | Status: COMPLETED | OUTPATIENT
Start: 2019-01-07 | End: 2019-01-07

## 2019-01-07 RX ORDER — POTASSIUM CHLORIDE 20 MEQ
40 PACKET (EA) ORAL EVERY 4 HOURS
Qty: 0 | Refills: 0 | Status: COMPLETED | OUTPATIENT
Start: 2019-01-07 | End: 2019-01-07

## 2019-01-07 RX ORDER — POTASSIUM CHLORIDE 20 MEQ
40 PACKET (EA) ORAL ONCE
Qty: 0 | Refills: 0 | Status: COMPLETED | OUTPATIENT
Start: 2019-01-07 | End: 2019-01-07

## 2019-01-07 RX ORDER — SPIRONOLACTONE 25 MG/1
50 TABLET, FILM COATED ORAL
Qty: 0 | Refills: 0 | Status: DISCONTINUED | OUTPATIENT
Start: 2019-01-07 | End: 2019-01-15

## 2019-01-07 RX ADMIN — MILRINONE LACTATE 14.76 MICROGRAM(S)/KG/MIN: 1 INJECTION, SOLUTION INTRAVENOUS at 08:12

## 2019-01-07 RX ADMIN — MILRINONE LACTATE 14.76 MICROGRAM(S)/KG/MIN: 1 INJECTION, SOLUTION INTRAVENOUS at 05:26

## 2019-01-07 RX ADMIN — MAGNESIUM OXIDE 400 MG ORAL TABLET 400 MILLIGRAM(S): 241.3 TABLET ORAL at 17:33

## 2019-01-07 RX ADMIN — Medication 40 MILLIEQUIVALENT(S): at 13:29

## 2019-01-07 RX ADMIN — Medication 40 MILLIEQUIVALENT(S): at 12:04

## 2019-01-07 RX ADMIN — Medication 175 MICROGRAM(S): at 05:27

## 2019-01-07 RX ADMIN — Medication 1 TABLET(S): at 23:40

## 2019-01-07 RX ADMIN — Medication 2: at 17:33

## 2019-01-07 RX ADMIN — CALCITRIOL 0.5 MICROGRAM(S): 0.5 CAPSULE ORAL at 12:04

## 2019-01-07 RX ADMIN — Medication 667 MILLIGRAM(S): at 08:12

## 2019-01-07 RX ADMIN — GABAPENTIN 100 MILLIGRAM(S): 400 CAPSULE ORAL at 23:40

## 2019-01-07 RX ADMIN — GABAPENTIN 200 MILLIGRAM(S): 400 CAPSULE ORAL at 05:27

## 2019-01-07 RX ADMIN — MAGNESIUM OXIDE 400 MG ORAL TABLET 400 MILLIGRAM(S): 241.3 TABLET ORAL at 12:03

## 2019-01-07 RX ADMIN — Medication 1 TABLET(S): at 05:26

## 2019-01-07 RX ADMIN — Medication 10 MILLIGRAM(S): at 23:41

## 2019-01-07 RX ADMIN — Medication 667 MILLIGRAM(S): at 17:33

## 2019-01-07 RX ADMIN — CHLORHEXIDINE GLUCONATE 1 APPLICATION(S): 213 SOLUTION TOPICAL at 08:03

## 2019-01-07 RX ADMIN — Medication 667 MILLIGRAM(S): at 12:04

## 2019-01-07 RX ADMIN — BUMETANIDE 5 MG/HR: 0.25 INJECTION INTRAMUSCULAR; INTRAVENOUS at 05:26

## 2019-01-07 RX ADMIN — Medication 40 MILLIEQUIVALENT(S): at 09:17

## 2019-01-07 RX ADMIN — GABAPENTIN 200 MILLIGRAM(S): 400 CAPSULE ORAL at 23:40

## 2019-01-07 RX ADMIN — Medication 81 MILLIGRAM(S): at 12:04

## 2019-01-07 RX ADMIN — Medication 3: at 12:04

## 2019-01-07 RX ADMIN — Medication 10 MILLIGRAM(S): at 05:27

## 2019-01-07 RX ADMIN — Medication 1 TABLET(S): at 13:30

## 2019-01-07 RX ADMIN — BUMETANIDE 5 MG/HR: 0.25 INJECTION INTRAMUSCULAR; INTRAVENOUS at 08:12

## 2019-01-07 RX ADMIN — Medication 40 MILLIEQUIVALENT(S): at 20:13

## 2019-01-07 RX ADMIN — Medication 12.5 MILLIGRAM(S): at 05:26

## 2019-01-07 RX ADMIN — PANTOPRAZOLE SODIUM 40 MILLIGRAM(S): 20 TABLET, DELAYED RELEASE ORAL at 05:27

## 2019-01-07 RX ADMIN — SPIRONOLACTONE 50 MILLIGRAM(S): 25 TABLET, FILM COATED ORAL at 05:27

## 2019-01-07 RX ADMIN — Medication 10 MILLIGRAM(S): at 13:30

## 2019-01-07 RX ADMIN — GABAPENTIN 200 MILLIGRAM(S): 400 CAPSULE ORAL at 13:30

## 2019-01-07 RX ADMIN — MILRINONE LACTATE 14.76 MICROGRAM(S)/KG/MIN: 1 INJECTION, SOLUTION INTRAVENOUS at 23:41

## 2019-01-07 RX ADMIN — Medication 100 GRAM(S): at 20:13

## 2019-01-07 RX ADMIN — BUMETANIDE 5 MG/HR: 0.25 INJECTION INTRAMUSCULAR; INTRAVENOUS at 23:41

## 2019-01-07 NOTE — PROGRESS NOTE ADULT - PROBLEM SELECTOR PLAN 1
Please increase spironolactone to 50mg BID  Continue current dose of milrinone 0.5mcg/kg/min  Continue current dose of bumex gtt 1mg/hr  Continue metolazone 5mg daily  Continue hydralazine 10mg TID, hold for SBP < 90  BID BMP, Mg with aggressive diuresis  Goal K 4-4.5, Mg 2-2.5  Daily standing weights  Strict I/Os, fluid restriction of 1L/day

## 2019-01-07 NOTE — PROGRESS NOTE ADULT - SUBJECTIVE AND OBJECTIVE BOX
Subjective:  pt seen and examined, no complaints, ROS - .    acetaminophen   Tablet .. 650 milliGRAM(s) Oral every 6 hours PRN  acetaminophen  IVPB .. 1000 milliGRAM(s) IV Intermittent once  aspirin enteric coated 81 milliGRAM(s) Oral daily  buMETAnide Infusion 1 mG/Hr IV Continuous <Continuous>  calcitriol   Capsule 0.5 MICROGram(s) Oral daily  calcium acetate 667 milliGRAM(s) Oral three times a day with meals  calcium carbonate 1250 mG  + Vitamin D (OsCal 500 + D) 1 Tablet(s) Oral three times a day  chlorhexidine 4% Liquid 1 Application(s) Topical <User Schedule>  dextrose 40% Gel 15 Gram(s) Oral once PRN  dextrose 5%. 1000 milliLiter(s) IV Continuous <Continuous>  dextrose 50% Injectable 25 Gram(s) IV Push once  gabapentin 200 milliGRAM(s) Oral three times a day  gabapentin 100 milliGRAM(s) Oral at bedtime  glucagon  Injectable 1 milliGRAM(s) IntraMuscular once PRN  guaiFENesin   Syrup  (Sugar-Free) 200 milliGRAM(s) Oral every 6 hours PRN  hydrALAZINE 10 milliGRAM(s) Oral every 8 hours  insulin lispro (HumaLOG) corrective regimen sliding scale   SubCutaneous three times a day before meals  insulin lispro (HumaLOG) corrective regimen sliding scale   SubCutaneous at bedtime  levothyroxine 175 MICROGram(s) Oral daily  magnesium oxide 400 milliGRAM(s) Oral two times a day with meals  metolazone 5 milliGRAM(s) Oral daily  metoprolol succinate ER 12.5 milliGRAM(s) Oral daily  milrinone Infusion 0.5 MICROgram(s)/kG/Min IV Continuous <Continuous>  pantoprazole    Tablet 40 milliGRAM(s) Oral before breakfast  potassium chloride    Tablet ER 40 milliEquivalent(s) Oral daily  rifaximin 550 milliGRAM(s) Oral two times a day  spironolactone 50 milliGRAM(s) Oral daily                            9.4    6.20  )-----------( 294      ( 06 Jan 2019 06:08 )             29.5       Hemoglobin: 9.4 g/dL (01-06 @ 06:08)  Hemoglobin: 9.2 g/dL (01-05 @ 07:56)  Hemoglobin: 9.6 g/dL (01-04 @ 08:17)  Hemoglobin: 9.6 g/dL (01-03 @ 07:33)  Hemoglobin: 10.5 g/dL (01-02 @ 11:27)      01-06    124<L>  |  80<L>  |  32<H>  ----------------------------<  195<H>  3.9   |  27  |  1.21    Ca    7.2<L>      06 Jan 2019 18:21  Phos  6.0     01-06  Mg     1.7     01-06    TPro  7.6  /  Alb  3.7  /  TBili  3.3<H>  /  DBili  x   /  AST  38  /  ALT  24  /  AlkPhos  316<H>  01-06    Creatinine Trend: 1.21<--, 1.15<--, 1.10<--, 1.08<--, 1.08<--, 1.08<--    COAGS:           T(C): 36.8 (01-07-19 @ 04:10), Max: 36.8 (01-06-19 @ 14:04)  HR: 105 (01-07-19 @ 04:10) (96 - 105)  BP: 103/67 (01-07-19 @ 04:10) (103/67 - 117/78)  RR: 16 (01-07-19 @ 04:10) (16 - 18)  SpO2: 97% (01-07-19 @ 04:10) (97% - 100%)  Wt(kg): --    I&O's Summary    05 Jan 2019 07:01  -  06 Jan 2019 07:00  --------------------------------------------------------  IN: 1197.6 mL / OUT: 4200 mL / NET: -3002.4 mL    06 Jan 2019 07:01  -  07 Jan 2019 04:54  --------------------------------------------------------  IN: 1220 mL / OUT: 4300 mL / NET: -3080 mL        HEENT:  (-)icterus (-)pallor  CV: N S1 S2 1/6 ROSALINDA (+)2 Pulses B/l  Resp:  Clear to ausculatation B/L, normal effort  GI: (+) BS Soft, NT, ND  Lymph:  (+)Edema, (-)obvious lymphadenopathy  Skin: Warm to touch, Normal turgor  Psych: Appropriate mood and affect        ECG:    A sense V paced	    RADIOLOGY:         CXR:  No infiltartes     ASSESSMENT/PLAN: 	63y Female  F with PMHx of NICM s/p AICD, HTN, moderate-severe MR, reportedly recent LHC at [place] was negative, thyroid cancer s/p thyroidectomy, DM,  admitted with decompensated acute on chronic systolic heart failure.    - ASA,  statin   - trend q 12 sodium . CMP with diuresis   - tolerating aggressive diuresis with primacor and bumex gtt   - pt tolerating low dose BB with primacor gtt   - heart failure follow up   - cont zaroxlyn , hydralizine , aldactone   - renal follow up , trend creatine   -  GI / DVT prophylaxis,  keep K>4, mag >2.0   - Maintain strict I+O's, standing daily weights   D/W Dr Grimes

## 2019-01-07 NOTE — PROGRESS NOTE ADULT - SUBJECTIVE AND OBJECTIVE BOX
EP ATTENDING    tele: sinus tach, no events    no palpitations, no syncope, less dyspnea, less edema    acetaminophen   Tablet .. 650 milliGRAM(s) Oral every 6 hours PRN  acetaminophen  IVPB .. 1000 milliGRAM(s) IV Intermittent once  aspirin enteric coated 81 milliGRAM(s) Oral daily  buMETAnide Infusion 1 mG/Hr IV Continuous <Continuous>  calcitriol   Capsule 0.5 MICROGram(s) Oral daily  calcium acetate 667 milliGRAM(s) Oral three times a day with meals  calcium carbonate 1250 mG  + Vitamin D (OsCal 500 + D) 1 Tablet(s) Oral three times a day  chlorhexidine 4% Liquid 1 Application(s) Topical <User Schedule>  dextrose 40% Gel 15 Gram(s) Oral once PRN  dextrose 5%. 1000 milliLiter(s) IV Continuous <Continuous>  dextrose 50% Injectable 25 Gram(s) IV Push once  gabapentin 200 milliGRAM(s) Oral three times a day  gabapentin 100 milliGRAM(s) Oral at bedtime  glucagon  Injectable 1 milliGRAM(s) IntraMuscular once PRN  guaiFENesin   Syrup  (Sugar-Free) 200 milliGRAM(s) Oral every 6 hours PRN  hydrALAZINE 10 milliGRAM(s) Oral every 8 hours  insulin lispro (HumaLOG) corrective regimen sliding scale   SubCutaneous three times a day before meals  insulin lispro (HumaLOG) corrective regimen sliding scale   SubCutaneous at bedtime  levothyroxine 175 MICROGram(s) Oral daily  magnesium oxide 400 milliGRAM(s) Oral two times a day with meals  metolazone 5 milliGRAM(s) Oral daily  metoprolol succinate ER 12.5 milliGRAM(s) Oral daily  milrinone Infusion 0.5 MICROgram(s)/kG/Min IV Continuous <Continuous>  pantoprazole    Tablet 40 milliGRAM(s) Oral before breakfast  potassium chloride    Tablet ER 40 milliEquivalent(s) Oral daily  potassium chloride    Tablet ER 40 milliEquivalent(s) Oral every 4 hours  rifaximin 550 milliGRAM(s) Oral two times a day  spironolactone 50 milliGRAM(s) Oral daily                            9.6    6.22  )-----------( 282      ( 07 Jan 2019 07:56 )             29.8       01-07    129<L>  |  81<L>  |  34<H>  ----------------------------<  120<H>  3.1<L>   |  30  |  1.09    Ca    7.4<L>      07 Jan 2019 05:30  Phos  5.9     01-07  Mg     1.6     01-07    TPro  7.6  /  Alb  3.7  /  TBili  3.3<H>  /  DBili  x   /  AST  38  /  ALT  24  /  AlkPhos  316<H>  01-06      T(C): 36.8 (01-07-19 @ 04:10), Max: 36.8 (01-06-19 @ 14:04)  HR: 105 (01-07-19 @ 04:10) (99 - 105)  BP: 103/67 (01-07-19 @ 04:10) (103/67 - 117/78)  RR: 16 (01-07-19 @ 04:10) (16 - 18)  SpO2: 97% (01-07-19 @ 04:10) (97% - 100%)  Wt(kg): --    JVP 12  RRR, no murmurs  CTAB  soft nt/nd  no c/c, + 1 edema      A/P) She is a pleasant 62 y/o female PMH severe NICM (LVEF 10-15%) who had a [employer] Biv-ICD implanted at [facility]. A LHC at [facility] was also unremarkable. She now returns for with a severe decompensation of her NICM (JVP > 12, LE edema and a near 14 lb weight gain). Her device interrogation recently demonstrates excellent RA, RV and LV lead functions, but with frequent episodes of NSVT. She denies palpitations nor high voltage therapy. EP is now called because she is persistently tachycardic. Review of her EKGs and tele show the rhythm is sinus with adequate BiV-tracking (RBBB in V1, negative in I/L). She hasn't had any more significant NSVT despite milrinone.    -continue toprol   -continue milrinone and bumex  -her sinus tachycardia is a response to milrinone. Given that her device is adequately BiV-tracking her sinus tachycardia I would not readjust her device settings  -no need for AAD  -no need to recheck ICD  -a future option includes turning off BiV pacing to see if she responds better. This can be addressed when she's euvolemic and I will discuss with CHF team  -f/u with [facility] after discharge   -would repeat echo when she is euvolemic to see if she would benefit from a TV repair

## 2019-01-07 NOTE — PROGRESS NOTE ADULT - SUBJECTIVE AND OBJECTIVE BOX
NEPHROLOGY-[facility] [phone]        Patient seen and examined in bed.  SHe is about 187 lbs now.  Edema is much better        MEDICATIONS  (STANDING):  acetaminophen  IVPB .. 1000 milliGRAM(s) IV Intermittent once  aspirin enteric coated 81 milliGRAM(s) Oral daily  buMETAnide Infusion 1 mG/Hr (5 mL/Hr) IV Continuous <Continuous>  calcitriol   Capsule 0.5 MICROGram(s) Oral daily  calcium acetate 667 milliGRAM(s) Oral three times a day with meals  calcium carbonate 1250 mG  + Vitamin D (OsCal 500 + D) 1 Tablet(s) Oral three times a day  chlorhexidine 4% Liquid 1 Application(s) Topical <User Schedule>  dextrose 5%. 1000 milliLiter(s) (50 mL/Hr) IV Continuous <Continuous>  dextrose 50% Injectable 25 Gram(s) IV Push once  gabapentin 200 milliGRAM(s) Oral three times a day  gabapentin 100 milliGRAM(s) Oral at bedtime  hydrALAZINE 10 milliGRAM(s) Oral every 8 hours  insulin lispro (HumaLOG) corrective regimen sliding scale   SubCutaneous three times a day before meals  insulin lispro (HumaLOG) corrective regimen sliding scale   SubCutaneous at bedtime  levothyroxine 175 MICROGram(s) Oral daily  magnesium oxide 400 milliGRAM(s) Oral two times a day with meals  metolazone 5 milliGRAM(s) Oral daily  metoprolol succinate ER 12.5 milliGRAM(s) Oral daily  milrinone Infusion 0.5 MICROgram(s)/kG/Min (14.76 mL/Hr) IV Continuous <Continuous>  pantoprazole    Tablet 40 milliGRAM(s) Oral before breakfast  potassium chloride    Tablet ER 40 milliEquivalent(s) Oral daily  potassium chloride    Tablet ER 40 milliEquivalent(s) Oral every 4 hours  rifaximin 550 milliGRAM(s) Oral two times a day  spironolactone 50 milliGRAM(s) Oral daily      VITAL:  T(C): , Max: 36.8 (01-06-19 @ 14:04)  T(F): , Max: 98.3 (01-06-19 @ 14:04)  HR: 105 (01-07-19 @ 04:10)  BP: 103/67 (01-07-19 @ 04:10)  BP(mean): --  RR: 16 (01-07-19 @ 04:10)  SpO2: 97% (01-07-19 @ 04:10)  Wt(kg): --    I and O's:    01-06 @ 07:01  -  01-07 @ 07:00  --------------------------------------------------------  IN: 1457.6 mL / OUT: 5200 mL / NET: -3742.4 mL          PHYSICAL EXAM:    Constitutional: NAD  Neck:  No JVD  Respiratory: CTAB/L  Cardiovascular: S1 and S2  Gastrointestinal: BS+, soft, NT/ND  Extremities: trace  peripheral edema  Neurological: A/O x 3, no focal deficits  Psychiatric: Normal mood, normal affect  : No Gustafson  Skin: No rashes  Access: Not applicable    LABS:                        9.6    6.22  )-----------( 282      ( 07 Jan 2019 07:56 )             29.8     01-07    129<L>  |  81<L>  |  34<H>  ----------------------------<  120<H>  3.1<L>   |  30  |  1.09    Ca    7.4<L>      07 Jan 2019 05:30  Phos  5.9     01-07  Mg     1.6     01-07    TPro  7.6  /  Alb  3.7  /  TBili  3.3<H>  /  DBili  x   /  AST  38  /  ALT  24  /  AlkPhos  316<H>  01-06          Urine Studies:          RADIOLOGY & ADDITIONAL STUDIES:

## 2019-01-07 NOTE — PROGRESS NOTE ADULT - SUBJECTIVE AND OBJECTIVE BOX
Subjective:  pt seen and examined, no complaints, ROS - .    acetaminophen   Tablet .. 650 milliGRAM(s) Oral every 6 hours PRN  acetaminophen  IVPB .. 1000 milliGRAM(s) IV Intermittent once  aspirin enteric coated 81 milliGRAM(s) Oral daily  buMETAnide Infusion 1 mG/Hr IV Continuous <Continuous>  calcitriol   Capsule 0.5 MICROGram(s) Oral daily  calcium acetate 667 milliGRAM(s) Oral three times a day with meals  calcium carbonate 1250 mG  + Vitamin D (OsCal 500 + D) 1 Tablet(s) Oral three times a day  chlorhexidine 4% Liquid 1 Application(s) Topical <User Schedule>  dextrose 40% Gel 15 Gram(s) Oral once PRN  dextrose 5%. 1000 milliLiter(s) IV Continuous <Continuous>  dextrose 50% Injectable 25 Gram(s) IV Push once  gabapentin 200 milliGRAM(s) Oral three times a day  gabapentin 100 milliGRAM(s) Oral at bedtime  glucagon  Injectable 1 milliGRAM(s) IntraMuscular once PRN  guaiFENesin   Syrup  (Sugar-Free) 200 milliGRAM(s) Oral every 6 hours PRN  hydrALAZINE 10 milliGRAM(s) Oral every 8 hours  insulin lispro (HumaLOG) corrective regimen sliding scale   SubCutaneous three times a day before meals  insulin lispro (HumaLOG) corrective regimen sliding scale   SubCutaneous at bedtime  levothyroxine 175 MICROGram(s) Oral daily  magnesium oxide 400 milliGRAM(s) Oral two times a day with meals  metolazone 5 milliGRAM(s) Oral daily  metoprolol succinate ER 12.5 milliGRAM(s) Oral daily  milrinone Infusion 0.5 MICROgram(s)/kG/Min IV Continuous <Continuous>  pantoprazole    Tablet 40 milliGRAM(s) Oral before breakfast  potassium chloride    Tablet ER 40 milliEquivalent(s) Oral daily  rifaximin 550 milliGRAM(s) Oral two times a day  spironolactone 50 milliGRAM(s) Oral daily                            9.6    6.22  )-----------( 282      ( 07 Jan 2019 07:56 )             29.8       Hemoglobin: 9.6 g/dL (01-07 @ 07:56)  Hemoglobin: 9.4 g/dL (01-06 @ 06:08)  Hemoglobin: 9.2 g/dL (01-05 @ 07:56)  Hemoglobin: 9.6 g/dL (01-04 @ 08:17)  Hemoglobin: 9.6 g/dL (01-03 @ 07:33)      01-07    129<L>  |  81<L>  |  34<H>  ----------------------------<  120<H>  3.1<L>   |  30  |  1.09    Ca    7.4<L>      07 Jan 2019 05:30  Phos  5.9     01-07  Mg     1.6     01-07    TPro  7.6  /  Alb  3.7  /  TBili  3.3<H>  /  DBili  x   /  AST  38  /  ALT  24  /  AlkPhos  316<H>  01-06    Creatinine Trend: 1.09<--, 1.21<--, 1.15<--, 1.10<--, 1.08<--, 1.08<--    COAGS:           T(C): 36.4 (01-07-19 @ 13:30), Max: 36.8 (01-07-19 @ 04:10)  HR: 98 (01-07-19 @ 13:30) (98 - 105)  BP: 100/72 (01-07-19 @ 13:30) (100/72 - 107/72)  RR: 15 (01-07-19 @ 13:30) (15 - 18)  SpO2: 99% (01-07-19 @ 13:30) (97% - 100%)  Wt(kg): --    I&O's Summary    06 Jan 2019 07:01  -  07 Jan 2019 07:00  --------------------------------------------------------  IN: 1457.6 mL / OUT: 5200 mL / NET: -3742.4 mL    07 Jan 2019 07:01  -  07 Jan 2019 15:40  --------------------------------------------------------  IN: 100 mL / OUT: 2300 mL / NET: -2200 mL        HEENT:  (-)icterus (-)pallor  CV: N S1 S2 1/6 ROSALINDA (+)2 Pulses B/l  Resp:  Clear to ausculatation B/L, normal effort  GI: (+) BS Soft, NT, ND  Lymph:  (+)Edema, (-)obvious lymphadenopathy  Skin: Warm to touch, Normal turgor  Psych: Appropriate mood and affect        ECG:    A sense V paced	    RADIOLOGY:         CXR:  No infiltartes     ASSESSMENT/PLAN: 	63y Female  F with PMHx of NICM s/p AICD, HTN, moderate-severe MR, reportedly recent LHC at [facility] was negative, thyroid cancer s/p thyroidectomy, DM,  admitted with decompensated acute on chronic systolic heart failure.    - ASA,  statin   - tolerating aggressive diuresis with primacor and bumex gtt   - pt tolerating low dose BB with primacor gtt   - heart failure follow up   - cont zaroxlyn , hydralizine , aldactone   - renal follow up , trend creatine   -  GI / DVT prophylaxis,  keep K>4, mag >2.0   - Maintain strict I+O's, standing daily weights     Augusto Grimes MD, FACC

## 2019-01-07 NOTE — PROGRESS NOTE ADULT - ASSESSMENT
63 year old  woman with NICM (EF 10%, LVIDD 6.1cm, dx 2013) s/p CRT-D (6/2017), Mod-Severe MR, Severe TR, HTN, HLD, DM2, and Thyroid Cancer s/p resection c/b hypoparathyroidism with chronic hypocalcemia presents with SOB and weight gain 2/2 acute on chronic heart failure exacerbation. Currently diuresing well on Bumex gtt with 24 hr total UOP 5.2L, net negative 3.7L, but still remains notably volume overloaded. Hypervolemic hyponatremia gradually improving with diuresis. Renal function stable. Normotensive tolerating initiation of low dose of vasodilator.

## 2019-01-07 NOTE — PROGRESS NOTE ADULT - ASSESSMENT
ASSESSMENT/RECOMMEND  A 63-year-old female with past medical history of hypertension, diabetes, chronic kidney disease stage III, presents with grossly volume overloaded state.  She has hypervolemic, hyponatremia and will need aggressive diuresis.  Hypocalcemia and hypoparathyroidism  Hypokalemia    Cardiology.  On Bumex drip with good urine output; on primacor gtt.  We are approaching dry wt and can likely dc both gtt soon  Renal.  Serum sodium improving; no need for Samsca at present.  Avoid excessive amounts of liquids without calories.  Encourage protein intake. On KCl 40 meq PO daily but additional Kdur 40 x2 and then repeat(give all 3 swetha and then repeat)   Endo.  Continue with Synthroid.  Once she is out of heart failure, the dose of Synthroid may need to be adjusted.  Unclear how much medication she is absorbing as she has edema as well.       [facility]  [phone]

## 2019-01-07 NOTE — PROGRESS NOTE ADULT - SUBJECTIVE AND OBJECTIVE BOX
INTERVAL HPI/OVERNIGHT EVENTS: I feel fine.   Vital Signs Last 24 Hrs  T(C): 36.4 (07 Jan 2019 13:30), Max: 36.8 (07 Jan 2019 04:10)  T(F): 97.5 (07 Jan 2019 13:30), Max: 98.2 (07 Jan 2019 04:10)  HR: 98 (07 Jan 2019 13:30) (98 - 105)  BP: 100/72 (07 Jan 2019 13:30) (100/72 - 107/72)  BP(mean): --  RR: 15 (07 Jan 2019 13:30) (15 - 18)  SpO2: 99% (07 Jan 2019 13:30) (97% - 100%)  I&O's Summary    06 Jan 2019 07:01  -  07 Jan 2019 07:00  --------------------------------------------------------  IN: 1457.6 mL / OUT: 5200 mL / NET: -3742.4 mL    07 Jan 2019 07:01  -  07 Jan 2019 17:47  --------------------------------------------------------  IN: 100 mL / OUT: 3000 mL / NET: -2900 mL      MEDICATIONS  (STANDING):  acetaminophen  IVPB .. 1000 milliGRAM(s) IV Intermittent once  aspirin enteric coated 81 milliGRAM(s) Oral daily  buMETAnide Infusion 1 mG/Hr (5 mL/Hr) IV Continuous <Continuous>  calcitriol   Capsule 0.5 MICROGram(s) Oral daily  calcium acetate 667 milliGRAM(s) Oral three times a day with meals  calcium carbonate 1250 mG  + Vitamin D (OsCal 500 + D) 1 Tablet(s) Oral three times a day  chlorhexidine 4% Liquid 1 Application(s) Topical <User Schedule>  dextrose 5%. 1000 milliLiter(s) (50 mL/Hr) IV Continuous <Continuous>  dextrose 50% Injectable 25 Gram(s) IV Push once  gabapentin 200 milliGRAM(s) Oral three times a day  gabapentin 100 milliGRAM(s) Oral at bedtime  hydrALAZINE 10 milliGRAM(s) Oral every 8 hours  insulin lispro (HumaLOG) corrective regimen sliding scale   SubCutaneous three times a day before meals  insulin lispro (HumaLOG) corrective regimen sliding scale   SubCutaneous at bedtime  levothyroxine 175 MICROGram(s) Oral daily  magnesium oxide 400 milliGRAM(s) Oral two times a day with meals  metolazone 5 milliGRAM(s) Oral daily  metoprolol succinate ER 12.5 milliGRAM(s) Oral daily  milrinone Infusion 0.5 MICROgram(s)/kG/Min (14.76 mL/Hr) IV Continuous <Continuous>  pantoprazole    Tablet 40 milliGRAM(s) Oral before breakfast  potassium chloride    Tablet ER 40 milliEquivalent(s) Oral daily  rifaximin 550 milliGRAM(s) Oral two times a day  spironolactone 50 milliGRAM(s) Oral daily    MEDICATIONS  (PRN):  acetaminophen   Tablet .. 650 milliGRAM(s) Oral every 6 hours PRN Mild Pain (1 - 3)  dextrose 40% Gel 15 Gram(s) Oral once PRN Blood Glucose LESS THAN 70 milliGRAM(s)/deciliter  glucagon  Injectable 1 milliGRAM(s) IntraMuscular once PRN Glucose LESS THAN 70 milligrams/deciliter  guaiFENesin   Syrup  (Sugar-Free) 200 milliGRAM(s) Oral every 6 hours PRN Cough    LABS:                        9.6    6.22  )-----------( 282      ( 07 Jan 2019 07:56 )             29.8     01-07    129<L>  |  81<L>  |  34<H>  ----------------------------<  120<H>  3.1<L>   |  30  |  1.09    Ca    7.4<L>      07 Jan 2019 05:30  Phos  5.9     01-07  Mg     1.6     01-07    TPro  7.6  /  Alb  3.7  /  TBili  3.3<H>  /  DBili  x   /  AST  38  /  ALT  24  /  AlkPhos  316<H>  01-06        CAPILLARY BLOOD GLUCOSE      POCT Blood Glucose.: 236 mg/dL (07 Jan 2019 17:12)  POCT Blood Glucose.: 266 mg/dL (07 Jan 2019 11:45)  POCT Blood Glucose.: 133 mg/dL (07 Jan 2019 07:17)  POCT Blood Glucose.: 204 mg/dL (06 Jan 2019 21:40)          REVIEW OF SYSTEMS:  CONSTITUTIONAL: No fever, weight loss, or fatigue  EYES: No eye pain, visual disturbances, or discharge  ENMT:  No difficulty hearing, tinnitus, vertigo; No sinus or throat pain  NECK: No pain or stiffness  RESPIRATORY: No cough, wheezing, chills or hemoptysis; No shortness of breath  CARDIOVASCULAR: No chest pain, palpitations, dizziness, or leg swelling  GASTROINTESTINAL: No abdominal or epigastric pain. No nausea, vomiting, or hematemesis; No diarrhea or constipation. No melena or hematochezia.  GENITOURINARY: No dysuria, frequency, hematuria, or incontinence  NEUROLOGICAL: No headaches, memory loss, loss of strength, numbness, or tremors    Consultant(s) Notes Reviewed:  [x ] YES  [ ] NO    PHYSICAL EXAM:  GENERAL: NAD, well-groomed, well-developed ,not in any distress ,  HEAD:  Atraumatic, Normocephalic  EYES: EOMI, PERRLA, conjunctiva and sclera clear  NECK: Supple, No JVD, Normal thyroid  NERVOUS SYSTEM:  Alert & Oriented X3, No focal deficit   CHEST/LUNG: Good air entry bilateral with no  rales, rhonchi, wheezing, or rubs  HEART: Regular rate and rhythm; No murmurs, rubs, or gallops  ABDOMEN: Soft, Nontender, Nondistended; Bowel sounds present  EXTREMITIES:  2+ Peripheral Pulses, No clubbing, cyanosis,  but less edema    Care Discussed with Consultants/Other Providers [ x] YES  [ ] NO

## 2019-01-07 NOTE — PROGRESS NOTE ADULT - SUBJECTIVE AND OBJECTIVE BOX
Subjective:  - 4 beats WCT overnight  - Reports feeling well. Ambulating around the nursing unit without EDDY. Abdominal distention improving.  - Denies orthopnea, PND, CP, palpitations, lightheadedness, dizziness    Medications:  acetaminophen   Tablet .. 650 milliGRAM(s) Oral every 6 hours PRN  acetaminophen  IVPB .. 1000 milliGRAM(s) IV Intermittent once  aspirin enteric coated 81 milliGRAM(s) Oral daily  buMETAnide Infusion 1 mG/Hr IV Continuous <Continuous>  calcitriol   Capsule 0.5 MICROGram(s) Oral daily  calcium acetate 667 milliGRAM(s) Oral three times a day with meals  calcium carbonate 1250 mG  + Vitamin D (OsCal 500 + D) 1 Tablet(s) Oral three times a day  chlorhexidine 4% Liquid 1 Application(s) Topical <User Schedule>  dextrose 40% Gel 15 Gram(s) Oral once PRN  dextrose 5%. 1000 milliLiter(s) IV Continuous <Continuous>  dextrose 50% Injectable 25 Gram(s) IV Push once  gabapentin 200 milliGRAM(s) Oral three times a day  gabapentin 100 milliGRAM(s) Oral at bedtime  glucagon  Injectable 1 milliGRAM(s) IntraMuscular once PRN  guaiFENesin   Syrup  (Sugar-Free) 200 milliGRAM(s) Oral every 6 hours PRN  hydrALAZINE 10 milliGRAM(s) Oral every 8 hours  insulin lispro (HumaLOG) corrective regimen sliding scale   SubCutaneous three times a day before meals  insulin lispro (HumaLOG) corrective regimen sliding scale   SubCutaneous at bedtime  levothyroxine 175 MICROGram(s) Oral daily  magnesium oxide 400 milliGRAM(s) Oral two times a day with meals  metolazone 5 milliGRAM(s) Oral daily  metoprolol succinate ER 12.5 milliGRAM(s) Oral daily  milrinone Infusion 0.5 MICROgram(s)/kG/Min IV Continuous <Continuous>  pantoprazole    Tablet 40 milliGRAM(s) Oral before breakfast  potassium chloride    Tablet ER 40 milliEquivalent(s) Oral daily  rifaximin 550 milliGRAM(s) Oral two times a day  spironolactone 50 milliGRAM(s) Oral daily      Physical Exam:    Vitals:  Vital Signs Last 24 Hours  T(C): 36.4 (19 @ 13:30), Max: 36.8 (19 @ 04:10)  HR: 98 (19 @ 13:30) (98 - 105)  BP: 100/72 (19 @ 13:30) (100/72 - 107/72)  RR: 15 (19 @ 13:30) (15 - 18)  SpO2: 99% (19 @ 13:30) (97% - 100%)    Weight in k.7 ( @ 04:10)    I&O's Summary    2019 07:  -  2019 07:00  --------------------------------------------------------  IN: 1457.6 mL / OUT: 5200 mL / NET: -3742.4 mL    2019 07:  -  2019 15:49  --------------------------------------------------------  IN: 100 mL / OUT: 2300 mL / NET: -2200 mL      Tele: SR, HR , 4 beats WCT    General: No distress. Comfortable.  HEENT: EOM intact.  Neck: Neck supple. JVP moderately elevated.  Chest: Clear to auscultation bilaterally  CV: Tachycardic. Regular. Normal S1 and S2. III/VI SM. Radial pulses normal. Tense nonpitting edema BLE  Abdomen: Soft, mildly distended, nontender  Skin: No rashes or skin breakdown  Neurology: Alert and oriented times three. Sensation intact  Psych: Affect normal    Labs:                        9.6    6.22  )-----------( 282      ( 2019 07:56 )             29.8         129<L>  |  81<L>  |  34<H>  ----------------------------<  120<H>  3.1<L>   |  30  |  1.09    Ca    7.4<L>      2019 05:30  Phos  5.9       Mg     1.6         TPro  7.6  /  Alb  3.7  /  TBili  3.3<H>  /  DBili  x   /  AST  38  /  ALT  24  /  AlkPhos  316<H>      Serum Pro-Brain Natriuretic Peptide: 2026 pg/mL ( @ 21:33)

## 2019-01-07 NOTE — PROGRESS NOTE ADULT - ASSESSMENT
63F w/ PMHx of Hfref (mod-severe MR, EF 10% 11/2018) s/p ICD  s/p  PICC  on milrinone ,h/o  thyroid CA s/p resection c/b hypoparathyroidism, HTN, DMII, p/w worsening fluid retention      Problem/Plan - 1:  ·  Problem: Acute on Chronic Systolic CHF exacerbation.  Plan: Clinically improving.   -on Milrinone infusion and  Bumex drip .  - Spironolactone, hydralazine and Metolazone   - Heart failure and Cardiology helping.   - strict and outs   - daily weight    Problem/Plan - 2:  ·  Problem: DM (diabetes mellitus).  Plan: -Sugars in acceptable range.  -Hold oral hypoglycemics  -Start HISS, check fsg qac/qhs  -check a1c in am  -consistent carb diet.      Problem/Plan - 3:  ·  Problem: HTN (hypertension).  Plan: Well controlled.      Problem/Plan - 4:  ·  Problem: Hypothyroidism   Plan: - continue levothyroxine   - calcitriol.      Problem/Plan - 5:  ·  Problem: Hypocalcemia & Hyponatremia .  Plan: - Renal helping.   -calcium acetate   - calcium + D.      Problem/Plan - 6:  Problem: Anemia. Plan: HH stable. Work up in progress.    no active bleeding   - monitor CBC.     Problem/Plan - 7:  ·  Problem: Elevated liver function tests.  Plan:  2/2 congestive hepatopathy . LFT trending down.   - continue rifaximin.      Problem/Plan - 8:  ·  Problem: Diabetic neuropathy with Foot Pain .  Plan: - Continue Gabapentin and added extra dose at Bed time. . Podiatry consult noted.      Problem/Plan - 9:  ·  Problem: Need for prophylactic measure.  Plan: - sub q heparin.

## 2019-01-07 NOTE — PROGRESS NOTE ADULT - ATTENDING COMMENTS
Agree with above.   no urgent cath needed at this time  continue with inotrope assisted diuresis    Kunal Muller MD

## 2019-01-08 LAB
ANION GAP SERPL CALC-SCNC: 16 MMOL/L — SIGNIFICANT CHANGE UP (ref 5–17)
ANION GAP SERPL CALC-SCNC: 18 MMOL/L — HIGH (ref 5–17)
BUN SERPL-MCNC: 38 MG/DL — HIGH (ref 7–23)
BUN SERPL-MCNC: 39 MG/DL — HIGH (ref 7–23)
CALCIUM SERPL-MCNC: 7.8 MG/DL — LOW (ref 8.4–10.5)
CALCIUM SERPL-MCNC: 7.9 MG/DL — LOW (ref 8.4–10.5)
CHLORIDE SERPL-SCNC: 78 MMOL/L — LOW (ref 96–108)
CHLORIDE SERPL-SCNC: 81 MMOL/L — LOW (ref 96–108)
CO2 SERPL-SCNC: 31 MMOL/L — SIGNIFICANT CHANGE UP (ref 22–31)
CO2 SERPL-SCNC: 31 MMOL/L — SIGNIFICANT CHANGE UP (ref 22–31)
CREAT SERPL-MCNC: 1.08 MG/DL — SIGNIFICANT CHANGE UP (ref 0.5–1.3)
CREAT SERPL-MCNC: 1.16 MG/DL — SIGNIFICANT CHANGE UP (ref 0.5–1.3)
FERRITIN SERPL-MCNC: 53 NG/ML — SIGNIFICANT CHANGE UP (ref 15–150)
GLUCOSE BLDC GLUCOMTR-MCNC: 169 MG/DL — HIGH (ref 70–99)
GLUCOSE BLDC GLUCOMTR-MCNC: 181 MG/DL — HIGH (ref 70–99)
GLUCOSE BLDC GLUCOMTR-MCNC: 181 MG/DL — HIGH (ref 70–99)
GLUCOSE BLDC GLUCOMTR-MCNC: 205 MG/DL — HIGH (ref 70–99)
GLUCOSE BLDC GLUCOMTR-MCNC: 239 MG/DL — HIGH (ref 70–99)
GLUCOSE SERPL-MCNC: 223 MG/DL — HIGH (ref 70–99)
GLUCOSE SERPL-MCNC: 247 MG/DL — HIGH (ref 70–99)
HCT VFR BLD CALC: 31.1 % — LOW (ref 34.5–45)
HGB BLD-MCNC: 10.1 G/DL — LOW (ref 11.5–15.5)
IRON SATN MFR SERPL: 33 UG/DL — SIGNIFICANT CHANGE UP (ref 30–160)
IRON SATN MFR SERPL: 8 % — LOW (ref 14–50)
MAGNESIUM SERPL-MCNC: 1.7 MG/DL — SIGNIFICANT CHANGE UP (ref 1.6–2.6)
MAGNESIUM SERPL-MCNC: 1.9 MG/DL — SIGNIFICANT CHANGE UP (ref 1.6–2.6)
MCHC RBC-ENTMCNC: 22.4 PG — LOW (ref 27–34)
MCHC RBC-ENTMCNC: 32.5 GM/DL — SIGNIFICANT CHANGE UP (ref 32–36)
MCV RBC AUTO: 69 FL — LOW (ref 80–100)
PLATELET # BLD AUTO: 282 K/UL — SIGNIFICANT CHANGE UP (ref 150–400)
POTASSIUM SERPL-MCNC: 3.3 MMOL/L — LOW (ref 3.5–5.3)
POTASSIUM SERPL-MCNC: 3.6 MMOL/L — SIGNIFICANT CHANGE UP (ref 3.5–5.3)
POTASSIUM SERPL-SCNC: 3.3 MMOL/L — LOW (ref 3.5–5.3)
POTASSIUM SERPL-SCNC: 3.6 MMOL/L — SIGNIFICANT CHANGE UP (ref 3.5–5.3)
RBC # BLD: 4.51 M/UL — SIGNIFICANT CHANGE UP (ref 3.8–5.2)
RBC # FLD: 22.3 % — HIGH (ref 10.3–14.5)
SODIUM SERPL-SCNC: 127 MMOL/L — LOW (ref 135–145)
SODIUM SERPL-SCNC: 128 MMOL/L — LOW (ref 135–145)
TIBC SERPL-MCNC: 419 UG/DL — SIGNIFICANT CHANGE UP (ref 220–430)
UIBC SERPL-MCNC: 386 UG/DL — HIGH (ref 110–370)
WBC # BLD: 6.26 K/UL — SIGNIFICANT CHANGE UP (ref 3.8–10.5)
WBC # FLD AUTO: 6.26 K/UL — SIGNIFICANT CHANGE UP (ref 3.8–10.5)

## 2019-01-08 RX ORDER — INSULIN GLARGINE 100 [IU]/ML
5 INJECTION, SOLUTION SUBCUTANEOUS AT BEDTIME
Qty: 0 | Refills: 0 | Status: DISCONTINUED | OUTPATIENT
Start: 2019-01-08 | End: 2019-01-09

## 2019-01-08 RX ORDER — BUMETANIDE 0.25 MG/ML
2 INJECTION INTRAMUSCULAR; INTRAVENOUS
Qty: 20 | Refills: 0 | Status: DISCONTINUED | OUTPATIENT
Start: 2019-01-08 | End: 2019-01-10

## 2019-01-08 RX ORDER — POTASSIUM CHLORIDE 20 MEQ
40 PACKET (EA) ORAL ONCE
Qty: 0 | Refills: 0 | Status: COMPLETED | OUTPATIENT
Start: 2019-01-08 | End: 2019-01-08

## 2019-01-08 RX ADMIN — Medication 10 MILLIGRAM(S): at 21:57

## 2019-01-08 RX ADMIN — BUMETANIDE 5 MG/HR: 0.25 INJECTION INTRAMUSCULAR; INTRAVENOUS at 08:55

## 2019-01-08 RX ADMIN — GABAPENTIN 100 MILLIGRAM(S): 400 CAPSULE ORAL at 21:55

## 2019-01-08 RX ADMIN — Medication 10 MILLIGRAM(S): at 13:14

## 2019-01-08 RX ADMIN — CALCITRIOL 0.5 MICROGRAM(S): 0.5 CAPSULE ORAL at 12:05

## 2019-01-08 RX ADMIN — Medication 81 MILLIGRAM(S): at 12:05

## 2019-01-08 RX ADMIN — Medication 667 MILLIGRAM(S): at 17:20

## 2019-01-08 RX ADMIN — BUMETANIDE 5 MG/HR: 0.25 INJECTION INTRAMUSCULAR; INTRAVENOUS at 06:19

## 2019-01-08 RX ADMIN — PANTOPRAZOLE SODIUM 40 MILLIGRAM(S): 20 TABLET, DELAYED RELEASE ORAL at 06:21

## 2019-01-08 RX ADMIN — Medication 175 MICROGRAM(S): at 06:21

## 2019-01-08 RX ADMIN — Medication 12.5 MILLIGRAM(S): at 06:22

## 2019-01-08 RX ADMIN — GABAPENTIN 200 MILLIGRAM(S): 400 CAPSULE ORAL at 21:55

## 2019-01-08 RX ADMIN — INSULIN GLARGINE 5 UNIT(S): 100 INJECTION, SOLUTION SUBCUTANEOUS at 21:55

## 2019-01-08 RX ADMIN — Medication 40 MILLIEQUIVALENT(S): at 13:19

## 2019-01-08 RX ADMIN — Medication 1: at 12:05

## 2019-01-08 RX ADMIN — Medication 1 TABLET(S): at 06:22

## 2019-01-08 RX ADMIN — GABAPENTIN 200 MILLIGRAM(S): 400 CAPSULE ORAL at 13:14

## 2019-01-08 RX ADMIN — MILRINONE LACTATE 14.76 MICROGRAM(S)/KG/MIN: 1 INJECTION, SOLUTION INTRAVENOUS at 08:55

## 2019-01-08 RX ADMIN — MAGNESIUM OXIDE 400 MG ORAL TABLET 400 MILLIGRAM(S): 241.3 TABLET ORAL at 17:20

## 2019-01-08 RX ADMIN — SPIRONOLACTONE 50 MILLIGRAM(S): 25 TABLET, FILM COATED ORAL at 06:21

## 2019-01-08 RX ADMIN — Medication 1: at 08:52

## 2019-01-08 RX ADMIN — GABAPENTIN 200 MILLIGRAM(S): 400 CAPSULE ORAL at 06:21

## 2019-01-08 RX ADMIN — Medication 1 TABLET(S): at 13:14

## 2019-01-08 RX ADMIN — Medication 667 MILLIGRAM(S): at 08:53

## 2019-01-08 RX ADMIN — BUMETANIDE 10 MG/HR: 0.25 INJECTION INTRAMUSCULAR; INTRAVENOUS at 22:01

## 2019-01-08 RX ADMIN — Medication 667 MILLIGRAM(S): at 12:06

## 2019-01-08 RX ADMIN — MILRINONE LACTATE 14.76 MICROGRAM(S)/KG/MIN: 1 INJECTION, SOLUTION INTRAVENOUS at 20:00

## 2019-01-08 RX ADMIN — MAGNESIUM OXIDE 400 MG ORAL TABLET 400 MILLIGRAM(S): 241.3 TABLET ORAL at 08:53

## 2019-01-08 RX ADMIN — Medication 2: at 17:24

## 2019-01-08 RX ADMIN — SPIRONOLACTONE 50 MILLIGRAM(S): 25 TABLET, FILM COATED ORAL at 17:20

## 2019-01-08 RX ADMIN — Medication 1 TABLET(S): at 21:56

## 2019-01-08 RX ADMIN — Medication 40 MILLIEQUIVALENT(S): at 12:05

## 2019-01-08 RX ADMIN — MILRINONE LACTATE 14.76 MICROGRAM(S)/KG/MIN: 1 INJECTION, SOLUTION INTRAVENOUS at 06:20

## 2019-01-08 RX ADMIN — BUMETANIDE 10 MG/HR: 0.25 INJECTION INTRAMUSCULAR; INTRAVENOUS at 13:10

## 2019-01-08 RX ADMIN — Medication 10 MILLIGRAM(S): at 06:22

## 2019-01-08 RX ADMIN — CHLORHEXIDINE GLUCONATE 1 APPLICATION(S): 213 SOLUTION TOPICAL at 08:51

## 2019-01-08 NOTE — DIETITIAN INITIAL EVALUATION ADULT. - ENERGY NEEDS
Height (cm): 170.18 (01-02)  Weight (kg): 81.9 (01-05)  BMI (kg/m2): 28.6 (01-05)  IBW (kg): 61.3 +/-10%   % IBW: 133.6  No pressure injuries noted per nursing flow sheet, edema +1 B/L legs

## 2019-01-08 NOTE — DIETITIAN INITIAL EVALUATION ADULT. - PROBLEM SELECTOR PLAN 1
on milrinone infusion and oral Bumex however having poor response ,  discussed with cardiology fellow who deferred management until patient can be evaluated by heart failure team in the day time,  for now will continue current milrinone and bumex dose  - Cardiology consult - to be f/u by day team   - Heart failure consult - to be arranged by day team   - continue Bumex   - continue milrinone    - strict and outs   - daily weight   - bid electrolytes  - continue spironolactone

## 2019-01-08 NOTE — PROGRESS NOTE ADULT - SUBJECTIVE AND OBJECTIVE BOX
Subjective:  pt seen and examined, no complaints, ROS - .    acetaminophen   Tablet .. 650 milliGRAM(s) Oral every 6 hours PRN  acetaminophen  IVPB .. 1000 milliGRAM(s) IV Intermittent once  aspirin enteric coated 81 milliGRAM(s) Oral daily  buMETAnide Infusion 2 mG/Hr IV Continuous <Continuous>  calcitriol   Capsule 0.5 MICROGram(s) Oral daily  calcium acetate 667 milliGRAM(s) Oral three times a day with meals  calcium carbonate 1250 mG  + Vitamin D (OsCal 500 + D) 1 Tablet(s) Oral three times a day  chlorhexidine 4% Liquid 1 Application(s) Topical <User Schedule>  dextrose 40% Gel 15 Gram(s) Oral once PRN  dextrose 5%. 1000 milliLiter(s) IV Continuous <Continuous>  dextrose 50% Injectable 25 Gram(s) IV Push once  gabapentin 200 milliGRAM(s) Oral three times a day  gabapentin 100 milliGRAM(s) Oral at bedtime  glucagon  Injectable 1 milliGRAM(s) IntraMuscular once PRN  guaiFENesin   Syrup  (Sugar-Free) 200 milliGRAM(s) Oral every 6 hours PRN  hydrALAZINE 10 milliGRAM(s) Oral every 8 hours  insulin glargine Injectable (LANTUS) 5 Unit(s) SubCutaneous at bedtime  insulin lispro (HumaLOG) corrective regimen sliding scale   SubCutaneous three times a day before meals  insulin lispro (HumaLOG) corrective regimen sliding scale   SubCutaneous at bedtime  levothyroxine 175 MICROGram(s) Oral daily  magnesium oxide 400 milliGRAM(s) Oral two times a day with meals  metolazone 5 milliGRAM(s) Oral daily  metoprolol succinate ER 12.5 milliGRAM(s) Oral daily  milrinone Infusion 0.5 MICROgram(s)/kG/Min IV Continuous <Continuous>  pantoprazole    Tablet 40 milliGRAM(s) Oral before breakfast  potassium chloride    Tablet ER 40 milliEquivalent(s) Oral daily  rifaximin 550 milliGRAM(s) Oral two times a day  spironolactone 50 milliGRAM(s) Oral two times a day                            10.1   6.26  )-----------( 282      ( 08 Jan 2019 08:09 )             31.1       Hemoglobin: 10.1 g/dL (01-08 @ 08:09)  Hemoglobin: 9.6 g/dL (01-07 @ 07:56)  Hemoglobin: 9.4 g/dL (01-06 @ 06:08)  Hemoglobin: 9.2 g/dL (01-05 @ 07:56)  Hemoglobin: 9.6 g/dL (01-04 @ 08:17)      01-08    127<L>  |  78<L>  |  39<H>  ----------------------------<  247<H>  3.3<L>   |  31  |  1.16    Ca    7.9<L>      08 Jan 2019 17:57  Phos  5.9     01-07  Mg     1.7     01-08      Creatinine Trend: 1.16<--, 1.08<--, 1.09<--, 1.09<--, 1.21<--, 1.15<--    COAGS:           T(C): 36.6 (01-08-19 @ 13:13), Max: 36.7 (01-07-19 @ 21:46)  HR: 96 (01-08-19 @ 13:13) (96 - 100)  BP: 101/65 (01-08-19 @ 13:13) (100/66 - 110/75)  RR: 18 (01-08-19 @ 13:13) (18 - 18)  SpO2: 100% (01-08-19 @ 13:13) (99% - 100%)  Wt(kg): --    I&O's Summary    07 Jan 2019 07:01  -  08 Jan 2019 07:00  --------------------------------------------------------  IN: 875.2 mL / OUT: 5850 mL / NET: -4974.8 mL    08 Jan 2019 07:01  -  08 Jan 2019 18:43  --------------------------------------------------------  IN: 1120 mL / OUT: 4800 mL / NET: -3680 mL          HEENT:  (-)icterus (-)pallor  CV: N S1 S2 1/6 ROSALINDA (+)2 Pulses B/l  Resp:  Clear to ausculatation B/L, normal effort  GI: (+) BS Soft, NT, ND  Lymph:  (+)Edema, (-)obvious lymphadenopathy  Skin: Warm to touch, Normal turgor  Psych: Appropriate mood and affect        ECG:    A sense V paced	    RADIOLOGY:         CXR:  No infiltartes     ASSESSMENT/PLAN: 	63y Female  F with PMHx of NICM s/p AICD, HTN, moderate-severe MR, reportedly recent LHC at [place] was negative, thyroid cancer s/p thyroidectomy, DM,  admitted with decompensated acute on chronic systolic heart failure.    - ASA,  statin   - tolerating aggressive diuresis with primacor and bumex gtt   - pt tolerating low dose BB with primacor gtt   - heart failure follow up   - cont zaroxlyn , hydralizine , aldactone   - renal follow up , trend creatine   -  GI / DVT prophylaxis,  keep K>4, mag >2.0   - Maintain strict I+O's, standing daily weights   - Volume status improving    Augusto Grimes MD, [facility]

## 2019-01-08 NOTE — DIETITIAN INITIAL EVALUATION ADULT. - DIET TYPE
consistent carbohydrate (no snacks)/DASH/TLC (sodium and cholesterol restricted diet)/1/03/18/1200ml

## 2019-01-08 NOTE — PROGRESS NOTE ADULT - SUBJECTIVE AND OBJECTIVE BOX
INTERVAL HPI/OVERNIGHT EVENTS: My feet feel better now.   Vital Signs Last 24 Hrs  T(C): 36.6 (08 Jan 2019 04:41), Max: 36.7 (07 Jan 2019 21:46)  T(F): 97.9 (08 Jan 2019 04:41), Max: 98.1 (07 Jan 2019 21:46)  HR: 100 (08 Jan 2019 04:41) (97 - 100)  BP: 110/75 (08 Jan 2019 04:41) (100/66 - 110/75)  BP(mean): --  RR: 18 (08 Jan 2019 04:41) (15 - 18)  SpO2: 100% (08 Jan 2019 04:41) (99% - 100%)  I&O's Summary    07 Jan 2019 07:01  -  08 Jan 2019 07:00  --------------------------------------------------------  IN: 875.2 mL / OUT: 5850 mL / NET: -4974.8 mL    08 Jan 2019 07:01  -  08 Jan 2019 10:43  --------------------------------------------------------  IN: 360 mL / OUT: 1000 mL / NET: -640 mL      MEDICATIONS  (STANDING):  acetaminophen  IVPB .. 1000 milliGRAM(s) IV Intermittent once  aspirin enteric coated 81 milliGRAM(s) Oral daily  buMETAnide Infusion 1 mG/Hr (5 mL/Hr) IV Continuous <Continuous>  calcitriol   Capsule 0.5 MICROGram(s) Oral daily  calcium acetate 667 milliGRAM(s) Oral three times a day with meals  calcium carbonate 1250 mG  + Vitamin D (OsCal 500 + D) 1 Tablet(s) Oral three times a day  chlorhexidine 4% Liquid 1 Application(s) Topical <User Schedule>  dextrose 5%. 1000 milliLiter(s) (50 mL/Hr) IV Continuous <Continuous>  dextrose 50% Injectable 25 Gram(s) IV Push once  gabapentin 200 milliGRAM(s) Oral three times a day  gabapentin 100 milliGRAM(s) Oral at bedtime  hydrALAZINE 10 milliGRAM(s) Oral every 8 hours  insulin lispro (HumaLOG) corrective regimen sliding scale   SubCutaneous three times a day before meals  insulin lispro (HumaLOG) corrective regimen sliding scale   SubCutaneous at bedtime  levothyroxine 175 MICROGram(s) Oral daily  magnesium oxide 400 milliGRAM(s) Oral two times a day with meals  metolazone 5 milliGRAM(s) Oral daily  metoprolol succinate ER 12.5 milliGRAM(s) Oral daily  milrinone Infusion 0.5 MICROgram(s)/kG/Min (14.76 mL/Hr) IV Continuous <Continuous>  pantoprazole    Tablet 40 milliGRAM(s) Oral before breakfast  potassium chloride    Tablet ER 40 milliEquivalent(s) Oral daily  rifaximin 550 milliGRAM(s) Oral two times a day  spironolactone 50 milliGRAM(s) Oral two times a day    MEDICATIONS  (PRN):  acetaminophen   Tablet .. 650 milliGRAM(s) Oral every 6 hours PRN Mild Pain (1 - 3)  dextrose 40% Gel 15 Gram(s) Oral once PRN Blood Glucose LESS THAN 70 milliGRAM(s)/deciliter  glucagon  Injectable 1 milliGRAM(s) IntraMuscular once PRN Glucose LESS THAN 70 milligrams/deciliter  guaiFENesin   Syrup  (Sugar-Free) 200 milliGRAM(s) Oral every 6 hours PRN Cough    LABS:                        9.6    6.22  )-----------( 282      ( 07 Jan 2019 07:56 )             29.8     01-08    128<L>  |  81<L>  |  38<H>  ----------------------------<  223<H>  3.6   |  31  |  1.08    Ca    7.8<L>      08 Jan 2019 06:54  Phos  5.9     01-07  Mg     1.9     01-08          CAPILLARY BLOOD GLUCOSE      POCT Blood Glucose.: 169 mg/dL (08 Jan 2019 08:50)  POCT Blood Glucose.: 205 mg/dL (08 Jan 2019 07:17)  POCT Blood Glucose.: 218 mg/dL (07 Jan 2019 21:33)  POCT Blood Glucose.: 236 mg/dL (07 Jan 2019 17:12)  POCT Blood Glucose.: 266 mg/dL (07 Jan 2019 11:45)          REVIEW OF SYSTEMS:  CONSTITUTIONAL: No fever, weight loss, or fatigue  EYES: No eye pain, visual disturbances, or discharge  ENMT:  No difficulty hearing, tinnitus, vertigo; No sinus or throat pain  NECK: No pain or stiffness  RESPIRATORY: No cough, wheezing, chills or hemoptysis; No shortness of breath  CARDIOVASCULAR: No chest pain, palpitations, dizziness, or leg swelling  GASTROINTESTINAL: No abdominal or epigastric pain. No nausea, vomiting, or hematemesis; No diarrhea or constipation. No melena or hematochezia.  GENITOURINARY: No dysuria, frequency, hematuria, or incontinence  NEUROLOGICAL: No headaches, memory loss, loss of strength, numbness, or tremors      Consultant(s) Notes Reviewed:  [x ] YES  [ ] NO    PHYSICAL EXAM:  GENERAL: NAD, well-groomed, well-developed, not in any distress ,  HEAD:  Atraumatic, Normocephalic  EYES: EOMI, PERRLA, conjunctiva and sclera clear  NECK: Supple, No JVD, Normal thyroid  NERVOUS SYSTEM:  Alert & Oriented X3, No focal deficit   CHEST/LUNG: Good air entry bilateral with no  rales, rhonchi, wheezing, or rubs  HEART: Regular rate and rhythm; No murmurs, rubs, or gallops  ABDOMEN: Soft, Nontender, Nondistended; Bowel sounds present  EXTREMITIES:  2+ Peripheral Pulses, No clubbing, cyanosis, or edema    Care Discussed with Consultants/Other Providers [ x] YES  [ ] NO

## 2019-01-08 NOTE — PROGRESS NOTE ADULT - ATTENDING COMMENTS
weight coming down, will increase diuretic today  she is fixated that her dry weight is 180, which is today's weight however she still has JVP 12, and tense LE edema; known to have poor insight  - escalate diuretics as above  - daily standing weights

## 2019-01-08 NOTE — DIETITIAN INITIAL EVALUATION ADULT. - OTHER INFO
Pt seen for length of stay. Pt with PMHx CHF, T2DM, HTN, thyroid Ca. Currently presents with worsening fluid retention 2/2 CHF exacerbation. Pt interviewed at bedside. No nausea/vomiting/constipation/diarrhea or chewing/swallowing problems reported, NKFA. Pt states she weighs herself daily in the morning, noticed weight "creeping up" and abdominal distension since previous discharge despite monitoring PO fluids. States usual dry weight of 80 kg, currently weighs 81.9 kg (1-07). Previous discharge weight of 75.7 kg (11-). Pt able to teach back weight gain cutoffs for CHF (2 lbs/day, 5 lbs/week), items that count towards fluid intake (tea, coffee, soup). Handout provided on nutritional recommendations for heart failure, Pt was receptive to information.

## 2019-01-08 NOTE — PROGRESS NOTE ADULT - ASSESSMENT
ASSESSMENT/RECOMMEND  A 63-year-old female with past medical history of hypertension, diabetes, chronic kidney disease stage III, presents with grossly volume overloaded state.  She has hypervolemic, hyponatremia and will need aggressive diuresis.  Hypocalcemia and hypoparathyroidism  Hypokalemia and hyponatremia    Cardiology.  On Bumex drip with good urine output; on primacor gtt.  We are approaching dry wt and can likely dc both gtt soon.  I suspect her dry wt 175?  Renal.  Serum sodium stable; no need for Samsca at present.  Avoid excessive amounts of liquids without calories.  Encourage protein intake. On KCl 40 meq PO daily but additional Kdur 40 x 1    Endo.  Continue with Synthroid.  Once she is out of heart failure, the dose of Synthroid may need to be adjusted.  Unclear how much medication she is absorbing as she has gut edema as well.       [facility]  [phone]

## 2019-01-08 NOTE — PROGRESS NOTE ADULT - ASSESSMENT
63 year old  woman with NICM (EF 10%, LVIDD 6.1cm, dx 2013) s/p CRT-D (6/2017), Mod-Severe MR, Severe TR, HTN, HLD, DM2, and Thyroid Cancer s/p resection c/b hypoparathyroidism with chronic hypocalcemia presents with SOB and weight gain 2/2 acute on chronic heart failure exacerbation. Currently diuresing well on Bumex gtt with 24 hr total UOP 2.8L, net negative 2.8L, but still remains volume overloaded. Hypervolemic hyponatremia gradually improving with diuresis. Renal function stable. Normotensive, tolerating low dose vasodilator.

## 2019-01-08 NOTE — DIETITIAN INITIAL EVALUATION ADULT. - NS AS NUTRI INTERV ED CONTENT
Reviewed dietary recommendations for CHF: sodium restriction, fluid restriction and general healthful diet recommendations. Encouraged daily weights to monitor weight gain/Nutrition relationship to health/disease

## 2019-01-08 NOTE — PROGRESS NOTE ADULT - SUBJECTIVE AND OBJECTIVE BOX
Subjective:  pt seen and examined, no complaints    Tele : NSR     acetaminophen   Tablet .. 650 milliGRAM(s) Oral every 6 hours PRN  acetaminophen  IVPB .. 1000 milliGRAM(s) IV Intermittent once  aspirin enteric coated 81 milliGRAM(s) Oral daily  buMETAnide Infusion 1 mG/Hr IV Continuous <Continuous>  calcitriol   Capsule 0.5 MICROGram(s) Oral daily  calcium acetate 667 milliGRAM(s) Oral three times a day with meals  calcium carbonate 1250 mG  + Vitamin D (OsCal 500 + D) 1 Tablet(s) Oral three times a day  chlorhexidine 4% Liquid 1 Application(s) Topical <User Schedule>  dextrose 40% Gel 15 Gram(s) Oral once PRN  dextrose 5%. 1000 milliLiter(s) IV Continuous <Continuous>  dextrose 50% Injectable 25 Gram(s) IV Push once  gabapentin 200 milliGRAM(s) Oral three times a day  gabapentin 100 milliGRAM(s) Oral at bedtime  glucagon  Injectable 1 milliGRAM(s) IntraMuscular once PRN  guaiFENesin   Syrup  (Sugar-Free) 200 milliGRAM(s) Oral every 6 hours PRN  hydrALAZINE 10 milliGRAM(s) Oral every 8 hours  insulin lispro (HumaLOG) corrective regimen sliding scale   SubCutaneous three times a day before meals  insulin lispro (HumaLOG) corrective regimen sliding scale   SubCutaneous at bedtime  levothyroxine 175 MICROGram(s) Oral daily  magnesium oxide 400 milliGRAM(s) Oral two times a day with meals  metolazone 5 milliGRAM(s) Oral daily  metoprolol succinate ER 12.5 milliGRAM(s) Oral daily  milrinone Infusion 0.5 MICROgram(s)/kG/Min IV Continuous <Continuous>  pantoprazole    Tablet 40 milliGRAM(s) Oral before breakfast  potassium chloride    Tablet ER 40 milliEquivalent(s) Oral daily  rifaximin 550 milliGRAM(s) Oral two times a day  spironolactone 50 milliGRAM(s) Oral two times a day                            9.6    6.22  )-----------( 282      ( 07 Jan 2019 07:56 )             29.8       Hemoglobin: 9.6 g/dL (01-07 @ 07:56)  Hemoglobin: 9.4 g/dL (01-06 @ 06:08)  Hemoglobin: 9.2 g/dL (01-05 @ 07:56)  Hemoglobin: 9.6 g/dL (01-04 @ 08:17)  Hemoglobin: 9.6 g/dL (01-03 @ 07:33)      01-07    126<L>  |  78<L>  |  35<H>  ----------------------------<  253<H>  3.5   |  30  |  1.09    Ca    7.3<L>      07 Jan 2019 18:15  Phos  5.9     01-07  Mg     1.6     01-07    TPro  7.6  /  Alb  3.7  /  TBili  3.3<H>  /  DBili  x   /  AST  38  /  ALT  24  /  AlkPhos  316<H>  01-06    Creatinine Trend: 1.09<--, 1.09<--, 1.21<--, 1.15<--, 1.10<--, 1.08<--    COAGS:           T(C): 36.7 (01-07-19 @ 21:46), Max: 36.8 (01-07-19 @ 04:10)  HR: 97 (01-07-19 @ 21:46) (97 - 105)  BP: 100/66 (01-07-19 @ 21:46) (100/66 - 103/67)  RR: 18 (01-07-19 @ 21:46) (15 - 18)  SpO2: 99% (01-07-19 @ 21:46) (97% - 99%)  Wt(kg): --    I&O's Summary    06 Jan 2019 07:01  -  07 Jan 2019 07:00  --------------------------------------------------------  IN: 1457.6 mL / OUT: 5200 mL / NET: -3742.4 mL    07 Jan 2019 07:01  -  08 Jan 2019 03:40  --------------------------------------------------------  IN: 637.6 mL / OUT: 4700 mL / NET: -4062.4 mL    	    HEENT:  (-)icterus (-)pallor  CV: N S1 S2 1/6 ROSALINDA (+)2 Pulses B/l  Resp:  Clear to ausculatation B/L, normal effort  GI: (+) BS Soft, NT, ND  Lymph:  (+)Edema, (-)obvious lymphadenopathy  Skin: Warm to touch, Normal turgor  Psych: Appropriate mood and affect        ECG:    A sense V paced	    RADIOLOGY:         CXR:  No infiltartes     ASSESSMENT/PLAN: 	63y Female  F with PMHx of NICM s/p AICD, HTN, moderate-severe MR, reportedly recent LHC at [place] was negative, thyroid cancer s/p thyroidectomy, DM,  admitted with decompensated acute on chronic systolic heart failure.    - ASA,  statin   - tolerating aggressive diuresis with primacor and bumex gtt , zaroxlyn   - pt tolerating low dose BB with primacor gtt   - heart failure follow up   - cont hydralizine , aldactone BP stable   - trend h/h ,occult negative   - renal follow up , trend creatine   -  GI / DVT prophylaxis,  keep K>4, mag >2.0   - Maintain strict I+O's, standing daily weights   D/W Dr Muller Subjective:  pt seen and examined, no complaints    Tele : NSR     acetaminophen   Tablet .. 650 milliGRAM(s) Oral every 6 hours PRN  acetaminophen  IVPB .. 1000 milliGRAM(s) IV Intermittent once  aspirin enteric coated 81 milliGRAM(s) Oral daily  buMETAnide Infusion 1 mG/Hr IV Continuous <Continuous>  calcitriol   Capsule 0.5 MICROGram(s) Oral daily  calcium acetate 667 milliGRAM(s) Oral three times a day with meals  calcium carbonate 1250 mG  + Vitamin D (OsCal 500 + D) 1 Tablet(s) Oral three times a day  chlorhexidine 4% Liquid 1 Application(s) Topical <User Schedule>  dextrose 40% Gel 15 Gram(s) Oral once PRN  dextrose 5%. 1000 milliLiter(s) IV Continuous <Continuous>  dextrose 50% Injectable 25 Gram(s) IV Push once  gabapentin 200 milliGRAM(s) Oral three times a day  gabapentin 100 milliGRAM(s) Oral at bedtime  glucagon  Injectable 1 milliGRAM(s) IntraMuscular once PRN  guaiFENesin   Syrup  (Sugar-Free) 200 milliGRAM(s) Oral every 6 hours PRN  hydrALAZINE 10 milliGRAM(s) Oral every 8 hours  insulin lispro (HumaLOG) corrective regimen sliding scale   SubCutaneous three times a day before meals  insulin lispro (HumaLOG) corrective regimen sliding scale   SubCutaneous at bedtime  levothyroxine 175 MICROGram(s) Oral daily  magnesium oxide 400 milliGRAM(s) Oral two times a day with meals  metolazone 5 milliGRAM(s) Oral daily  metoprolol succinate ER 12.5 milliGRAM(s) Oral daily  milrinone Infusion 0.5 MICROgram(s)/kG/Min IV Continuous <Continuous>  pantoprazole    Tablet 40 milliGRAM(s) Oral before breakfast  potassium chloride    Tablet ER 40 milliEquivalent(s) Oral daily  rifaximin 550 milliGRAM(s) Oral two times a day  spironolactone 50 milliGRAM(s) Oral two times a day                            9.6    6.22  )-----------( 282      ( 07 Jan 2019 07:56 )             29.8       Hemoglobin: 9.6 g/dL (01-07 @ 07:56)  Hemoglobin: 9.4 g/dL (01-06 @ 06:08)  Hemoglobin: 9.2 g/dL (01-05 @ 07:56)  Hemoglobin: 9.6 g/dL (01-04 @ 08:17)  Hemoglobin: 9.6 g/dL (01-03 @ 07:33)      01-07    126<L>  |  78<L>  |  35<H>  ----------------------------<  253<H>  3.5   |  30  |  1.09    Ca    7.3<L>      07 Jan 2019 18:15  Phos  5.9     01-07  Mg     1.6     01-07    TPro  7.6  /  Alb  3.7  /  TBili  3.3<H>  /  DBili  x   /  AST  38  /  ALT  24  /  AlkPhos  316<H>  01-06    Creatinine Trend: 1.09<--, 1.09<--, 1.21<--, 1.15<--, 1.10<--, 1.08<--    COAGS:           T(C): 36.7 (01-07-19 @ 21:46), Max: 36.8 (01-07-19 @ 04:10)  HR: 97 (01-07-19 @ 21:46) (97 - 105)  BP: 100/66 (01-07-19 @ 21:46) (100/66 - 103/67)  RR: 18 (01-07-19 @ 21:46) (15 - 18)  SpO2: 99% (01-07-19 @ 21:46) (97% - 99%)  Wt(kg): --    I&O's Summary    06 Jan 2019 07:01  -  07 Jan 2019 07:00  --------------------------------------------------------  IN: 1457.6 mL / OUT: 5200 mL / NET: -3742.4 mL    07 Jan 2019 07:01  -  08 Jan 2019 03:40  --------------------------------------------------------  IN: 637.6 mL / OUT: 4700 mL / NET: -4062.4 mL    	    HEENT:  (-)icterus (-)pallor  CV: N S1 S2 1/6 ROSALINDA (+)2 Pulses B/l  Resp:  Clear to ausculatation B/L, normal effort  GI: (+) BS Soft, NT, ND  Lymph:  (+)Edema, (-)obvious lymphadenopathy  Skin: Warm to touch, Normal turgor  Psych: Appropriate mood and affect        ECG:    A sense V paced	    RADIOLOGY:         CXR:  No infiltartes     ASSESSMENT/PLAN: 	63y Female  F with PMHx of NICM s/p AICD, HTN, moderate-severe MR, reportedly recent LHC at [place] was negative, thyroid cancer s/p thyroidectomy, DM,  admitted with decompensated acute on chronic systolic heart failure.    - ASA,  statin   - tolerating aggressive diuresis with primacor and bumex gtt , zaroxlyn   - pt tolerating low dose BB with primacor gtt   - heart failure follow up   - cont hydralizine , aldactone BP stable   - trend h/h ,occult negative   - renal follow up , trend creatine   -  GI / DVT prophylaxis,  keep K>4, mag >2.0   - Maintain strict I+O's, standing daily weights

## 2019-01-08 NOTE — DIETITIAN INITIAL EVALUATION ADULT. - PERTINENT LABORATORY DATA
01-08 Na 128<L>    K+ 3.6   Cl- 81<L>    CO2 31    BUN 38<H>    Cr 1.08    Glu 223<H> 24Hr FS; 01-08-19 @ 08:50 -- 169<H> 205<H> 218<H> 236<H> 266<H>  Hemoglobin A1c 6.0 (11-8-18)

## 2019-01-08 NOTE — PROGRESS NOTE ADULT - ASSESSMENT
63F w/ PMHx of Hfref (mod-severe MR, EF 10% 11/2018) s/p ICD  s/p  PICC  on milrinone ,h/o  thyroid CA s/p resection c/b hypoparathyroidism, HTN, DMII, p/w worsening fluid retention      Problem/Plan - 1:  ·  Problem: Acute on Chronic Systolic CHF exacerbation.  Plan: Clinically improving.   -on Milrinone infusion and  Bumex drip .  - Spironolactone, hydralazine and Metolazone   - Heart failure and Cardiology helping.   - strict and outs   - daily weight    Problem/Plan - 2:  ·  Problem: DM (diabetes mellitus).  Plan: -Sugars high so added Lantus .   -Hold oral hypoglycemics  -Start HISS, check fsg qac/qhs  -consistent carb diet.      Problem/Plan - 3:  ·  Problem: HTN (hypertension).  Plan: Well controlled.      Problem/Plan - 4:  ·  Problem: Hypothyroidism   Plan: - continue levothyroxine   - calcitriol.      Problem/Plan - 5:  ·  Problem: Hypocalcemia & Hyponatremia .  Plan: - Renal helping.   -calcium acetate   - calcium + D.      Problem/Plan - 6:  Problem: Anemia. Plan: HH stable. Work up in progress.    no active bleeding   - monitor CBC.     Problem/Plan - 7:  ·  Problem: Elevated liver function tests.  Plan:  2/2 congestive hepatopathy . LFT trending down.   - continue rifaximin.      Problem/Plan - 8:  ·  Problem: Diabetic neuropathy with Foot Pain .  Plan: - Continue Gabapentin and added extra dose at Bed time. . Podiatry consult noted.      Problem/Plan - 9:  ·  Problem: Need for prophylactic measure.  Plan: - sub q heparin.

## 2019-01-08 NOTE — PROGRESS NOTE ADULT - PROBLEM SELECTOR PLAN 1
- Increase Bumex infusion to 2 mg/hr.  - Continue current dose of milrinone at 0.5 mcg/kg/min  - Continue metolazone 5 mg daily  - Continue hydralazine 10 mg TID, hold for SBP <90  - Continue spironolactone 50 mg BID  - BID BMP and Mg with aggressive diuresis  - Goal K 4-4.5, Mg 2-2.5  - Strict I/Os and daily standing weights  - Fluid restriction of 1L/day

## 2019-01-08 NOTE — PROGRESS NOTE ADULT - SUBJECTIVE AND OBJECTIVE BOX
Subjective:  - No acute events overnight  - Ambulating around nursing unit and denies any dyspnea, fatigue, CP, palpitations, lightheadedness, dizziness, orthopnea, or PND.    Medications:  acetaminophen   Tablet .. 650 milliGRAM(s) Oral every 6 hours PRN  acetaminophen  IVPB .. 1000 milliGRAM(s) IV Intermittent once  aspirin enteric coated 81 milliGRAM(s) Oral daily  buMETAnide Infusion 1 mG/Hr IV Continuous  calcitriol   Capsule 0.5 MICROGram(s) Oral daily  calcium acetate 667 milliGRAM(s) Oral three times a day with meals  calcium carbonate 1250 mG  + Vitamin D (OsCal 500 + D) 1 Tablet(s) Oral three times a day  chlorhexidine 4% Liquid 1 Application(s) Topical <User Schedule>  dextrose 40% Gel 15 Gram(s) Oral once PRN  dextrose 5%. 1000 milliLiter(s) IV Continuous <Continuous>  dextrose 50% Injectable 25 Gram(s) IV Push once  gabapentin 200 milliGRAM(s) Oral three times a day  gabapentin 100 milliGRAM(s) Oral at bedtime  glucagon  Injectable 1 milliGRAM(s) IntraMuscular once PRN  guaiFENesin   Syrup  (Sugar-Free) 200 milliGRAM(s) Oral every 6 hours PRN  hydrALAZINE 10 milliGRAM(s) Oral every 8 hours  insulin glargine Injectable (LANTUS) 5 Unit(s) SubCutaneous at bedtime  insulin lispro (HumaLOG) corrective regimen sliding scale   SubCutaneous three times a day before meals  insulin lispro (HumaLOG) corrective regimen sliding scale   SubCutaneous at bedtime  levothyroxine 175 MICROGram(s) Oral daily  magnesium oxide 400 milliGRAM(s) Oral two times a day with meals  metolazone 5 milliGRAM(s) Oral daily  metoprolol succinate ER 12.5 milliGRAM(s) Oral daily  milrinone Infusion 0.5 MICROgram(s)/kG/Min IV Continuous  pantoprazole    Tablet 40 milliGRAM(s) Oral before breakfast  potassium chloride    Tablet ER 40 milliEquivalent(s) Oral daily  rifaximin 550 milliGRAM(s) Oral two times a day  spironolactone 50 milliGRAM(s) Oral two times a day    Physical Exam:    Vitals:  Vital Signs Last 24 Hours  T(C): 36.6 (19 @ 04:41), Max: 36.7 (19 @ 21:46)  HR: 100 (19 @ 04:41) (97 - 100)  BP: 110/75 (19 @ 04:41) (100/66 - 110/75)  RR: 18 (19 @ 04:41) (15 - 18)  SpO2: 100% (19 @ 04:41) (99% - 100%)    Weight in k.7 ( @ 10:42)    I&O's Summary    2019 07:  -  2019 07:00  --------------------------------------------------------  IN: 875.2 mL / OUT: 5850 mL / NET: -4974.8 mL    2019 07:01  -  2019 12:56  --------------------------------------------------------  IN: 360 mL / OUT: 2800 mL / NET: -2440 mL    Tele: SR     General: No distress. Comfortable.  HEENT: EOM intact.  Neck: Neck supple. JVP 12 cm H2O. No masses  Chest: Clear to auscultation bilaterally  CV: RRR. Normal S1 and S2. III/VI SM. Radial pulses normal. Tense nonpitting BLE a to calves.  Abdomen: Soft, non-distended, non-tender  Skin: No rashes or skin breakdown  Neurology: Alert and oriented times three. Sensation intact  Psych: Affect normal    Labs:                        10.1   6.26  )-----------( 282      ( 2019 08:09 )             31.1         128<L>  |  81<L>  |  38<H>  ----------------------------<  223<H>  3.6   |  31  |  1.08    Ca    7.8<L>      2019 06:54  Phos  5.9       Mg     1.9                 Serum Pro-Brain Natriuretic Peptide: 2026 pg/mL ( @ 21:33) Subjective:  - No acute events overnight  - Ambulating around nursing unit and denies any dyspnea, fatigue, CP, palpitations, lightheadedness, dizziness, orthopnea, or PND.    Medications:  acetaminophen   Tablet .. 650 milliGRAM(s) Oral every 6 hours PRN  acetaminophen  IVPB .. 1000 milliGRAM(s) IV Intermittent once  aspirin enteric coated 81 milliGRAM(s) Oral daily  buMETAnide Infusion 1 mG/Hr IV Continuous  calcitriol   Capsule 0.5 MICROGram(s) Oral daily  calcium acetate 667 milliGRAM(s) Oral three times a day with meals  calcium carbonate 1250 mG  + Vitamin D (OsCal 500 + D) 1 Tablet(s) Oral three times a day  chlorhexidine 4% Liquid 1 Application(s) Topical <User Schedule>  dextrose 40% Gel 15 Gram(s) Oral once PRN  dextrose 5%. 1000 milliLiter(s) IV Continuous <Continuous>  dextrose 50% Injectable 25 Gram(s) IV Push once  gabapentin 200 milliGRAM(s) Oral three times a day  gabapentin 100 milliGRAM(s) Oral at bedtime  glucagon  Injectable 1 milliGRAM(s) IntraMuscular once PRN  guaiFENesin   Syrup  (Sugar-Free) 200 milliGRAM(s) Oral every 6 hours PRN  hydrALAZINE 10 milliGRAM(s) Oral every 8 hours  insulin glargine Injectable (LANTUS) 5 Unit(s) SubCutaneous at bedtime  insulin lispro (HumaLOG) corrective regimen sliding scale   SubCutaneous three times a day before meals  insulin lispro (HumaLOG) corrective regimen sliding scale   SubCutaneous at bedtime  levothyroxine 175 MICROGram(s) Oral daily  magnesium oxide 400 milliGRAM(s) Oral two times a day with meals  metolazone 5 milliGRAM(s) Oral daily  metoprolol succinate ER 12.5 milliGRAM(s) Oral daily  milrinone Infusion 0.5 MICROgram(s)/kG/Min IV Continuous  pantoprazole    Tablet 40 milliGRAM(s) Oral before breakfast  potassium chloride    Tablet ER 40 milliEquivalent(s) Oral daily  rifaximin 550 milliGRAM(s) Oral two times a day  spironolactone 50 milliGRAM(s) Oral two times a day    Physical Exam:    Vitals:  Vital Signs Last 24 Hours  T(C): 36.6 (19 @ 04:41), Max: 36.7 (19 @ 21:46)  HR: 100 (19 @ 04:41) (97 - 100)  BP: 110/75 (19 @ 04:41) (100/66 - 110/75)  RR: 18 (19 @ 04:41) (15 - 18)  SpO2: 100% (19 @ 04:41) (99% - 100%)    Weight in k.7 ( @ 10:42)    I&O's Summary    2019 07:  -  2019 07:00  --------------------------------------------------------  IN: 875.2 mL / OUT: 5850 mL / NET: -4974.8 mL    2019 07:01  -  2019 12:56  --------------------------------------------------------  IN: 360 mL / OUT: 2800 mL / NET: -2440 mL    Tele: SR     General: No distress. Comfortable.  HEENT: EOM intact.  Neck: Neck supple. JVP 12 cm H2O. No masses  Chest: Clear to auscultation bilaterally  CV: RRR. Normal S1 and S2. III/VI SM. Radial pulses normal. Tense nonpitting BLE edema to calves.  Abdomen: Soft, non-distended, non-tender  Skin: No rashes or skin breakdown  Neurology: Alert and oriented times three. Sensation intact  Psych: Affect normal    Labs:                        10.1   6.26  )-----------( 282      ( 2019 08:09 )             31.1         128<L>  |  81<L>  |  38<H>  ----------------------------<  223<H>  3.6   |  31  |  1.08    Ca    7.8<L>      2019 06:54  Phos  5.9       Mg     1.9         Serum Pro-Brain Natriuretic Peptide: 2026 pg/mL ( @ 21:33)

## 2019-01-08 NOTE — DIETITIAN INITIAL EVALUATION ADULT. - PERTINENT MEDS FT
calcium carbonate 1250 mG  + Vitamin D (OsCal 500 + D)  dextrose 40% Gel PRN  dextrose 50% Injectable  glucagon  Injectable PRN  insulin glargine Injectable (LANTUS)  insulin lispro (HumaLOG) corrective regimen sliding scale  magnesium oxide  pantoprazole  potassium chloride

## 2019-01-08 NOTE — PROGRESS NOTE ADULT - ATTENDING COMMENTS
EP ATTENDING    Agree with above. Rhythm remains sinus tach. No need for AAD nor repeat device interrogation. Would repeat ANDER after she is euvolemic to see if TR is still severe requiring potential TV repair.

## 2019-01-08 NOTE — DIETITIAN INITIAL EVALUATION ADULT. - NS AS NUTRI INTERV MEALS SNACK
Fat - modified diet/Consistent carbohydrate, DASH diet; defer fluid to team/Fluid - modified diet/Carbohydrate - modified diet/Mineral - modified diet

## 2019-01-08 NOTE — PROGRESS NOTE ADULT - SUBJECTIVE AND OBJECTIVE BOX
[facility] [phone]        Patient seen and examined in bed.  She felt better        MEDICATIONS  (STANDING):  acetaminophen  IVPB .. 1000 milliGRAM(s) IV Intermittent once  aspirin enteric coated 81 milliGRAM(s) Oral daily  buMETAnide Infusion 2 mG/Hr (10 mL/Hr) IV Continuous <Continuous>  calcitriol   Capsule 0.5 MICROGram(s) Oral daily  calcium acetate 667 milliGRAM(s) Oral three times a day with meals  calcium carbonate 1250 mG  + Vitamin D (OsCal 500 + D) 1 Tablet(s) Oral three times a day  chlorhexidine 4% Liquid 1 Application(s) Topical <User Schedule>  dextrose 5%. 1000 milliLiter(s) (50 mL/Hr) IV Continuous <Continuous>  dextrose 50% Injectable 25 Gram(s) IV Push once  gabapentin 200 milliGRAM(s) Oral three times a day  gabapentin 100 milliGRAM(s) Oral at bedtime  hydrALAZINE 10 milliGRAM(s) Oral every 8 hours  insulin glargine Injectable (LANTUS) 5 Unit(s) SubCutaneous at bedtime  insulin lispro (HumaLOG) corrective regimen sliding scale   SubCutaneous three times a day before meals  insulin lispro (HumaLOG) corrective regimen sliding scale   SubCutaneous at bedtime  levothyroxine 175 MICROGram(s) Oral daily  magnesium oxide 400 milliGRAM(s) Oral two times a day with meals  metolazone 5 milliGRAM(s) Oral daily  metoprolol succinate ER 12.5 milliGRAM(s) Oral daily  milrinone Infusion 0.5 MICROgram(s)/kG/Min (14.76 mL/Hr) IV Continuous <Continuous>  pantoprazole    Tablet 40 milliGRAM(s) Oral before breakfast  potassium chloride    Tablet ER 40 milliEquivalent(s) Oral daily  rifaximin 550 milliGRAM(s) Oral two times a day  spironolactone 50 milliGRAM(s) Oral two times a day      VITAL:  T(C): , Max: 36.7 (01-07-19 @ 21:46)  T(F): , Max: 98.1 (01-07-19 @ 21:46)  HR: 100 (01-08-19 @ 04:41)  BP: 110/75 (01-08-19 @ 04:41)  BP(mean): --  RR: 18 (01-08-19 @ 04:41)  SpO2: 100% (01-08-19 @ 04:41)  Wt(kg): --    I and O's:    01-07 @ 07:01  -  01-08 @ 07:00  --------------------------------------------------------  IN: 875.2 mL / OUT: 5850 mL / NET: -4974.8 mL    01-08 @ 07:01  -  01-08 @ 12:57  --------------------------------------------------------  IN: 360 mL / OUT: 2800 mL / NET: -2440 mL          PHYSICAL EXAM:    Constitutional: NAD  Neck:  No JVD  Respiratory: CTAB/L  Cardiovascular: S1 and S2  Gastrointestinal: BS+, soft, NT/ND  Extremities: trace peripheral edema  Neurological: A/O x 3, no focal deficits  Psychiatric: Normal mood, normal affect  : No Gustafson  Skin: No rashes  Access: picc    LABS:                        10.1   6.26  )-----------( 282      ( 08 Jan 2019 08:09 )             31.1     01-08    128<L>  |  81<L>  |  38<H>  ----------------------------<  223<H>  3.6   |  31  |  1.08    Ca    7.8<L>      08 Jan 2019 06:54  Phos  5.9     01-07  Mg     1.9     01-08            Urine Studies:          RADIOLOGY & ADDITIONAL STUDIES:    < from: Xray Foot AP + Lateral + Oblique, Bilat (01.05.19 @ 18:22) >    EXAM:  FOOT COMPLETE BILAT-MIN 3 VIEWS                            PROCEDURE DATE:  01/05/2019            INTERPRETATION:  EXAMINATION: 3 views each foot    CLINICAL INFORMATION: B/L foot pain and edema    IMPRESSION:     No acute fracture or dislocation. No radiographic evidence of   osteomyelitis. Status post resection of the head of the proximal phalanx   of the fifth digit on the right. Otherwise, preserved joint spaces.   Bilateral plantar and dorsal calcaneal enthesophytes.                     JOHNATHAN JAVED M.D., ATTENDING RADIOLOGIST  This document has been electronically signed. Jan 6 2019 11:02AM                < end of copied text >

## 2019-01-08 NOTE — DIETITIAN INITIAL EVALUATION ADULT. - NS AS NUTRI INTERV STRATEGIES
1) Continue current diet Rx  2) Monitor PO intake / labs / GI function / skin integrity  3) Obtain daily weights  4) RDN to remain available

## 2019-01-09 LAB
ANION GAP SERPL CALC-SCNC: 17 MMOL/L — SIGNIFICANT CHANGE UP (ref 5–17)
ANION GAP SERPL CALC-SCNC: 19 MMOL/L — HIGH (ref 5–17)
BUN SERPL-MCNC: 45 MG/DL — HIGH (ref 7–23)
BUN SERPL-MCNC: 50 MG/DL — HIGH (ref 7–23)
CALCIUM SERPL-MCNC: 8.8 MG/DL — SIGNIFICANT CHANGE UP (ref 8.4–10.5)
CALCIUM SERPL-MCNC: 8.8 MG/DL — SIGNIFICANT CHANGE UP (ref 8.4–10.5)
CHLORIDE SERPL-SCNC: 76 MMOL/L — LOW (ref 96–108)
CHLORIDE SERPL-SCNC: 78 MMOL/L — LOW (ref 96–108)
CO2 SERPL-SCNC: 29 MMOL/L — SIGNIFICANT CHANGE UP (ref 22–31)
CO2 SERPL-SCNC: 33 MMOL/L — HIGH (ref 22–31)
CREAT SERPL-MCNC: 1.12 MG/DL — SIGNIFICANT CHANGE UP (ref 0.5–1.3)
CREAT SERPL-MCNC: 1.24 MG/DL — SIGNIFICANT CHANGE UP (ref 0.5–1.3)
GLUCOSE BLDC GLUCOMTR-MCNC: 198 MG/DL — HIGH (ref 70–99)
GLUCOSE BLDC GLUCOMTR-MCNC: 206 MG/DL — HIGH (ref 70–99)
GLUCOSE BLDC GLUCOMTR-MCNC: 263 MG/DL — HIGH (ref 70–99)
GLUCOSE BLDC GLUCOMTR-MCNC: 290 MG/DL — HIGH (ref 70–99)
GLUCOSE BLDC GLUCOMTR-MCNC: 290 MG/DL — HIGH (ref 70–99)
GLUCOSE SERPL-MCNC: 159 MG/DL — HIGH (ref 70–99)
GLUCOSE SERPL-MCNC: 316 MG/DL — HIGH (ref 70–99)
HCT VFR BLD CALC: 34 % — LOW (ref 34.5–45)
HGB BLD-MCNC: 11.1 G/DL — LOW (ref 11.5–15.5)
MAGNESIUM SERPL-MCNC: 1.8 MG/DL — SIGNIFICANT CHANGE UP (ref 1.6–2.6)
MAGNESIUM SERPL-MCNC: 2.1 MG/DL — SIGNIFICANT CHANGE UP (ref 1.6–2.6)
MCHC RBC-ENTMCNC: 22.6 PG — LOW (ref 27–34)
MCHC RBC-ENTMCNC: 32.6 GM/DL — SIGNIFICANT CHANGE UP (ref 32–36)
MCV RBC AUTO: 69.1 FL — LOW (ref 80–100)
PLATELET # BLD AUTO: 363 K/UL — SIGNIFICANT CHANGE UP (ref 150–400)
POTASSIUM SERPL-MCNC: 3.3 MMOL/L — LOW (ref 3.5–5.3)
POTASSIUM SERPL-MCNC: 3.9 MMOL/L — SIGNIFICANT CHANGE UP (ref 3.5–5.3)
POTASSIUM SERPL-SCNC: 3.3 MMOL/L — LOW (ref 3.5–5.3)
POTASSIUM SERPL-SCNC: 3.9 MMOL/L — SIGNIFICANT CHANGE UP (ref 3.5–5.3)
RBC # BLD: 4.92 M/UL — SIGNIFICANT CHANGE UP (ref 3.8–5.2)
RBC # FLD: 22.4 % — HIGH (ref 10.3–14.5)
SODIUM SERPL-SCNC: 124 MMOL/L — LOW (ref 135–145)
SODIUM SERPL-SCNC: 128 MMOL/L — LOW (ref 135–145)
WBC # BLD: 7 K/UL — SIGNIFICANT CHANGE UP (ref 3.8–10.5)
WBC # FLD AUTO: 7 K/UL — SIGNIFICANT CHANGE UP (ref 3.8–10.5)

## 2019-01-09 RX ORDER — POTASSIUM CHLORIDE 20 MEQ
40 PACKET (EA) ORAL EVERY 4 HOURS
Qty: 0 | Refills: 0 | Status: DISCONTINUED | OUTPATIENT
Start: 2019-01-09 | End: 2019-01-09

## 2019-01-09 RX ORDER — POTASSIUM CHLORIDE 20 MEQ
40 PACKET (EA) ORAL ONCE
Qty: 0 | Refills: 0 | Status: COMPLETED | OUTPATIENT
Start: 2019-01-09 | End: 2019-01-09

## 2019-01-09 RX ORDER — INSULIN GLARGINE 100 [IU]/ML
10 INJECTION, SOLUTION SUBCUTANEOUS AT BEDTIME
Qty: 0 | Refills: 0 | Status: DISCONTINUED | OUTPATIENT
Start: 2019-01-09 | End: 2019-01-10

## 2019-01-09 RX ORDER — MAGNESIUM SULFATE 500 MG/ML
1 VIAL (ML) INJECTION ONCE
Qty: 0 | Refills: 0 | Status: COMPLETED | OUTPATIENT
Start: 2019-01-09 | End: 2019-01-09

## 2019-01-09 RX ADMIN — PANTOPRAZOLE SODIUM 40 MILLIGRAM(S): 20 TABLET, DELAYED RELEASE ORAL at 06:10

## 2019-01-09 RX ADMIN — Medication 1: at 22:35

## 2019-01-09 RX ADMIN — SPIRONOLACTONE 50 MILLIGRAM(S): 25 TABLET, FILM COATED ORAL at 06:10

## 2019-01-09 RX ADMIN — Medication 667 MILLIGRAM(S): at 17:35

## 2019-01-09 RX ADMIN — Medication 2: at 08:00

## 2019-01-09 RX ADMIN — BUMETANIDE 10 MG/HR: 0.25 INJECTION INTRAMUSCULAR; INTRAVENOUS at 08:01

## 2019-01-09 RX ADMIN — Medication 10 MILLIGRAM(S): at 06:10

## 2019-01-09 RX ADMIN — Medication 667 MILLIGRAM(S): at 12:22

## 2019-01-09 RX ADMIN — Medication 1 TABLET(S): at 22:36

## 2019-01-09 RX ADMIN — Medication 10 MILLIGRAM(S): at 22:36

## 2019-01-09 RX ADMIN — Medication 667 MILLIGRAM(S): at 08:01

## 2019-01-09 RX ADMIN — Medication 40 MILLIEQUIVALENT(S): at 17:35

## 2019-01-09 RX ADMIN — MILRINONE LACTATE 14.76 MICROGRAM(S)/KG/MIN: 1 INJECTION, SOLUTION INTRAVENOUS at 22:36

## 2019-01-09 RX ADMIN — Medication 40 MILLIEQUIVALENT(S): at 09:32

## 2019-01-09 RX ADMIN — Medication 175 MICROGRAM(S): at 06:10

## 2019-01-09 RX ADMIN — MAGNESIUM OXIDE 400 MG ORAL TABLET 400 MILLIGRAM(S): 241.3 TABLET ORAL at 08:01

## 2019-01-09 RX ADMIN — Medication 1: at 12:17

## 2019-01-09 RX ADMIN — MILRINONE LACTATE 14.76 MICROGRAM(S)/KG/MIN: 1 INJECTION, SOLUTION INTRAVENOUS at 08:01

## 2019-01-09 RX ADMIN — GABAPENTIN 200 MILLIGRAM(S): 400 CAPSULE ORAL at 06:10

## 2019-01-09 RX ADMIN — GABAPENTIN 200 MILLIGRAM(S): 400 CAPSULE ORAL at 15:09

## 2019-01-09 RX ADMIN — Medication 81 MILLIGRAM(S): at 12:22

## 2019-01-09 RX ADMIN — BUMETANIDE 10 MG/HR: 0.25 INJECTION INTRAMUSCULAR; INTRAVENOUS at 22:36

## 2019-01-09 RX ADMIN — Medication 100 GRAM(S): at 01:02

## 2019-01-09 RX ADMIN — Medication 12.5 MILLIGRAM(S): at 06:09

## 2019-01-09 RX ADMIN — Medication 1 TABLET(S): at 15:09

## 2019-01-09 RX ADMIN — GABAPENTIN 200 MILLIGRAM(S): 400 CAPSULE ORAL at 22:35

## 2019-01-09 RX ADMIN — Medication 1 TABLET(S): at 06:10

## 2019-01-09 RX ADMIN — Medication 3: at 17:34

## 2019-01-09 RX ADMIN — SPIRONOLACTONE 50 MILLIGRAM(S): 25 TABLET, FILM COATED ORAL at 17:36

## 2019-01-09 RX ADMIN — MAGNESIUM OXIDE 400 MG ORAL TABLET 400 MILLIGRAM(S): 241.3 TABLET ORAL at 17:35

## 2019-01-09 RX ADMIN — CALCITRIOL 0.5 MICROGRAM(S): 0.5 CAPSULE ORAL at 12:22

## 2019-01-09 RX ADMIN — INSULIN GLARGINE 10 UNIT(S): 100 INJECTION, SOLUTION SUBCUTANEOUS at 22:05

## 2019-01-09 RX ADMIN — GABAPENTIN 100 MILLIGRAM(S): 400 CAPSULE ORAL at 22:35

## 2019-01-09 RX ADMIN — Medication 40 MILLIEQUIVALENT(S): at 23:20

## 2019-01-09 RX ADMIN — CHLORHEXIDINE GLUCONATE 1 APPLICATION(S): 213 SOLUTION TOPICAL at 06:11

## 2019-01-09 RX ADMIN — Medication 10 MILLIGRAM(S): at 15:09

## 2019-01-09 RX ADMIN — Medication 40 MILLIEQUIVALENT(S): at 12:21

## 2019-01-09 NOTE — PROGRESS NOTE ADULT - SUBJECTIVE AND OBJECTIVE BOX
Subjective:  pt seen and examined, no complaints    Tele : NSR , no arrythmia     acetaminophen   Tablet .. 650 milliGRAM(s) Oral every 6 hours PRN  acetaminophen  IVPB .. 1000 milliGRAM(s) IV Intermittent once  aspirin enteric coated 81 milliGRAM(s) Oral daily  buMETAnide Infusion 2 mG/Hr IV Continuous <Continuous>  calcitriol   Capsule 0.5 MICROGram(s) Oral daily  calcium acetate 667 milliGRAM(s) Oral three times a day with meals  calcium carbonate 1250 mG  + Vitamin D (OsCal 500 + D) 1 Tablet(s) Oral three times a day  chlorhexidine 4% Liquid 1 Application(s) Topical <User Schedule>  dextrose 40% Gel 15 Gram(s) Oral once PRN  dextrose 5%. 1000 milliLiter(s) IV Continuous <Continuous>  dextrose 50% Injectable 25 Gram(s) IV Push once  gabapentin 200 milliGRAM(s) Oral three times a day  gabapentin 100 milliGRAM(s) Oral at bedtime  glucagon  Injectable 1 milliGRAM(s) IntraMuscular once PRN  guaiFENesin   Syrup  (Sugar-Free) 200 milliGRAM(s) Oral every 6 hours PRN  hydrALAZINE 10 milliGRAM(s) Oral every 8 hours  insulin glargine Injectable (LANTUS) 5 Unit(s) SubCutaneous at bedtime  insulin lispro (HumaLOG) corrective regimen sliding scale   SubCutaneous three times a day before meals  insulin lispro (HumaLOG) corrective regimen sliding scale   SubCutaneous at bedtime  levothyroxine 175 MICROGram(s) Oral daily  magnesium oxide 400 milliGRAM(s) Oral two times a day with meals  metolazone 5 milliGRAM(s) Oral daily  metoprolol succinate ER 12.5 milliGRAM(s) Oral daily  milrinone Infusion 0.5 MICROgram(s)/kG/Min IV Continuous <Continuous>  pantoprazole    Tablet 40 milliGRAM(s) Oral before breakfast  potassium chloride    Tablet ER 40 milliEquivalent(s) Oral daily  rifaximin 550 milliGRAM(s) Oral two times a day  spironolactone 50 milliGRAM(s) Oral two times a day                            10.1   6.26  )-----------( 282      ( 08 Jan 2019 08:09 )             31.1       Hemoglobin: 10.1 g/dL (01-08 @ 08:09)  Hemoglobin: 9.6 g/dL (01-07 @ 07:56)  Hemoglobin: 9.4 g/dL (01-06 @ 06:08)  Hemoglobin: 9.2 g/dL (01-05 @ 07:56)  Hemoglobin: 9.6 g/dL (01-04 @ 08:17)      01-08    127<L>  |  78<L>  |  39<H>  ----------------------------<  247<H>  3.3<L>   |  31  |  1.16    Ca    7.9<L>      08 Jan 2019 17:57  Mg     1.7     01-08      Creatinine Trend: 1.16<--, 1.08<--, 1.09<--, 1.09<--, 1.21<--, 1.15<--    COAGS:           T(C): 36.9 (01-09-19 @ 04:59), Max: 36.9 (01-09-19 @ 04:59)  HR: 104 (01-09-19 @ 04:59) (96 - 104)  BP: 108/71 (01-09-19 @ 04:59) (101/65 - 110/75)  RR: 18 (01-09-19 @ 04:59) (18 - 20)  SpO2: 98% (01-09-19 @ 04:59) (98% - 100%)  Wt(kg): --    I&O's Summary    08 Jan 2019 07:01  -  09 Jan 2019 07:00  --------------------------------------------------------  IN: 1517.6 mL / OUT: 9600 mL / NET: -8082.4 mL      	    HEENT:  (-)icterus (-)pallor  CV: N S1 S2 1/6 ROSALINDA (+)2 Pulses B/l  Resp:  Clear to ausculatation B/L, normal effort  GI: (+) BS Soft, NT, ND  Lymph:  (+)Edema, (-)obvious lymphadenopathy  Skin: Warm to touch, Normal turgor  Psych: Appropriate mood and affect        ECG:    A sense V paced	    RADIOLOGY:         CXR:  No infiltartes     ASSESSMENT/PLAN: 	63y Female  F with PMHx of NICM s/p AICD, HTN, moderate-severe MR, reportedly recent LHC at [place] was negative, thyroid cancer s/p thyroidectomy, DM,  admitted with decompensated acute on chronic systolic heart failure.    - ASA,  statin   - tele stable / paced   - tolerating aggressive diuresis with primacor and bumex gtt , zaroxlyn   - pt tolerating low dose BB with primacor gtt   - heart failure follow up   - cont hydralizine , aldactone BP stable   - CBC remains stable    - renal follow up  -  GI / DVT prophylaxis,  keep K>4, mag >2.0   -  pt will need repeat echo once euvolemic to evaluate TR gradients Subjective:  pt seen and examined, no complaints    Tele : NSR , no arrythmia     acetaminophen   Tablet .. 650 milliGRAM(s) Oral every 6 hours PRN  acetaminophen  IVPB .. 1000 milliGRAM(s) IV Intermittent once  aspirin enteric coated 81 milliGRAM(s) Oral daily  buMETAnide Infusion 2 mG/Hr IV Continuous <Continuous>  calcitriol   Capsule 0.5 MICROGram(s) Oral daily  calcium acetate 667 milliGRAM(s) Oral three times a day with meals  calcium carbonate 1250 mG  + Vitamin D (OsCal 500 + D) 1 Tablet(s) Oral three times a day  chlorhexidine 4% Liquid 1 Application(s) Topical <User Schedule>  dextrose 40% Gel 15 Gram(s) Oral once PRN  dextrose 5%. 1000 milliLiter(s) IV Continuous <Continuous>  dextrose 50% Injectable 25 Gram(s) IV Push once  gabapentin 200 milliGRAM(s) Oral three times a day  gabapentin 100 milliGRAM(s) Oral at bedtime  glucagon  Injectable 1 milliGRAM(s) IntraMuscular once PRN  guaiFENesin   Syrup  (Sugar-Free) 200 milliGRAM(s) Oral every 6 hours PRN  hydrALAZINE 10 milliGRAM(s) Oral every 8 hours  insulin glargine Injectable (LANTUS) 5 Unit(s) SubCutaneous at bedtime  insulin lispro (HumaLOG) corrective regimen sliding scale   SubCutaneous three times a day before meals  insulin lispro (HumaLOG) corrective regimen sliding scale   SubCutaneous at bedtime  levothyroxine 175 MICROGram(s) Oral daily  magnesium oxide 400 milliGRAM(s) Oral two times a day with meals  metolazone 5 milliGRAM(s) Oral daily  metoprolol succinate ER 12.5 milliGRAM(s) Oral daily  milrinone Infusion 0.5 MICROgram(s)/kG/Min IV Continuous <Continuous>  pantoprazole    Tablet 40 milliGRAM(s) Oral before breakfast  potassium chloride    Tablet ER 40 milliEquivalent(s) Oral daily  rifaximin 550 milliGRAM(s) Oral two times a day  spironolactone 50 milliGRAM(s) Oral two times a day                            10.1   6.26  )-----------( 282      ( 08 Jan 2019 08:09 )             31.1       Hemoglobin: 10.1 g/dL (01-08 @ 08:09)  Hemoglobin: 9.6 g/dL (01-07 @ 07:56)  Hemoglobin: 9.4 g/dL (01-06 @ 06:08)  Hemoglobin: 9.2 g/dL (01-05 @ 07:56)  Hemoglobin: 9.6 g/dL (01-04 @ 08:17)      01-08    127<L>  |  78<L>  |  39<H>  ----------------------------<  247<H>  3.3<L>   |  31  |  1.16    Ca    7.9<L>      08 Jan 2019 17:57  Mg     1.7     01-08      Creatinine Trend: 1.16<--, 1.08<--, 1.09<--, 1.09<--, 1.21<--, 1.15<--    COAGS:           T(C): 36.9 (01-09-19 @ 04:59), Max: 36.9 (01-09-19 @ 04:59)  HR: 104 (01-09-19 @ 04:59) (96 - 104)  BP: 108/71 (01-09-19 @ 04:59) (101/65 - 110/75)  RR: 18 (01-09-19 @ 04:59) (18 - 20)  SpO2: 98% (01-09-19 @ 04:59) (98% - 100%)  Wt(kg): --    I&O's Summary    08 Jan 2019 07:01  -  09 Jan 2019 07:00  --------------------------------------------------------  IN: 1517.6 mL / OUT: 9600 mL / NET: -8082.4 mL      	    HEENT:  (-)icterus (-)pallor  CV: N S1 S2 1/6 ROSALINDA (+)2 Pulses B/l  Resp:  Clear to ausculatation B/L, normal effort  GI: (+) BS Soft, NT, ND  Lymph:  (-)Edema, (-)obvious lymphadenopathy  Skin: Warm to touch, Normal turgor  Psych: Appropriate mood and affect        ECG:    A sense V paced	    RADIOLOGY:         CXR:  No infiltartes     ASSESSMENT/PLAN: 	63y Female  F with PMHx of NICM s/p AICD, HTN, moderate-severe MR, reportedly recent LHC at [place] was negative, thyroid cancer s/p thyroidectomy, DM,  admitted with decompensated acute on chronic systolic heart failure.    - ASA,  statin   - tele stable / paced   - tolerating aggressive diuresis with primacor and bumex gtt , zaroxlyn   - pt tolerating low dose BB with primacor gtt   - heart failure follow up   - cont hydralizine , aldactone BP stable   - CBC remains stable    - renal follow up  -  GI / DVT prophylaxis,  keep K>4, mag >2.0

## 2019-01-09 NOTE — PROGRESS NOTE ADULT - ASSESSMENT
63F w/ PMHx of Hfref (mod-severe MR, EF 10% 11/2018) s/p ICD  s/p  PICC  on milrinone ,h/o  thyroid CA s/p resection c/b hypoparathyroidism, HTN, DMII, p/w worsening fluid retention      Problem/Plan - 1:  ·  Problem: Acute on Chronic Systolic CHF exacerbation.  Plan: Clinically improving.   -on Milrinone infusion and  Bumex drip .  - Spironolactone, hydralazine and Metolazone   - Heart failure and Cardiology helping.   - strict and outs   - daily weight    Problem/Plan - 2:  ·  Problem: DM (diabetes mellitus).  Plan: -Sugars high so increased Lantus .   -Holding  oral hypoglycemics  -Start HISS, check fsg qac/qhs  -consistent carb diet.      Problem/Plan - 3:  ·  Problem: HTN (hypertension).  Plan: Well controlled.      Problem/Plan - 4:  ·  Problem: Hypothyroidism   Plan: - continue levothyroxine   - calcitriol.      Problem/Plan - 5:  ·  Problem: Hypocalcemia & Hyponatremia .  Plan: - Renal helping.   -calcium acetate   - calcium + D.      Problem/Plan - 6:  Problem: Anemia. Plan: HH stable. Work up in progress.    no active bleeding   - monitor CBC.     Problem/Plan - 7:  ·  Problem: Elevated liver function tests.  Plan:  2/2 congestive hepatopathy . LFT trending down.   - continue rifaximin.      Problem/Plan - 8:  ·  Problem: Diabetic neuropathy with Foot Pain .  Plan: - Continue Gabapentin and added extra dose at Bed time. . Podiatry consult noted.      Problem/Plan - 9:  ·  Problem: Need for prophylactic measure.  Plan: - sub q heparin.

## 2019-01-09 NOTE — PROGRESS NOTE ADULT - SUBJECTIVE AND OBJECTIVE BOX
[facility] [phone]        Patient seen and examined in bed.  On the Bumex and Primacor gtt        MEDICATIONS  (STANDING):  acetaminophen  IVPB .. 1000 milliGRAM(s) IV Intermittent once  aspirin enteric coated 81 milliGRAM(s) Oral daily  buMETAnide Infusion 2 mG/Hr (10 mL/Hr) IV Continuous <Continuous>  calcitriol   Capsule 0.5 MICROGram(s) Oral daily  calcium acetate 667 milliGRAM(s) Oral three times a day with meals  calcium carbonate 1250 mG  + Vitamin D (OsCal 500 + D) 1 Tablet(s) Oral three times a day  chlorhexidine 4% Liquid 1 Application(s) Topical <User Schedule>  dextrose 5%. 1000 milliLiter(s) (50 mL/Hr) IV Continuous <Continuous>  dextrose 50% Injectable 25 Gram(s) IV Push once  gabapentin 200 milliGRAM(s) Oral three times a day  gabapentin 100 milliGRAM(s) Oral at bedtime  hydrALAZINE 10 milliGRAM(s) Oral every 8 hours  insulin glargine Injectable (LANTUS) 5 Unit(s) SubCutaneous at bedtime  insulin lispro (HumaLOG) corrective regimen sliding scale   SubCutaneous three times a day before meals  insulin lispro (HumaLOG) corrective regimen sliding scale   SubCutaneous at bedtime  levothyroxine 175 MICROGram(s) Oral daily  magnesium oxide 400 milliGRAM(s) Oral two times a day with meals  metolazone 5 milliGRAM(s) Oral daily  metoprolol succinate ER 12.5 milliGRAM(s) Oral daily  milrinone Infusion 0.5 MICROgram(s)/kG/Min (14.76 mL/Hr) IV Continuous <Continuous>  pantoprazole    Tablet 40 milliGRAM(s) Oral before breakfast  potassium chloride    Tablet ER 40 milliEquivalent(s) Oral daily  rifaximin 550 milliGRAM(s) Oral two times a day  spironolactone 50 milliGRAM(s) Oral two times a day      VITAL:  T(C): , Max: 36.9 (01-09-19 @ 04:59)  T(F): , Max: 98.4 (01-09-19 @ 04:59)  HR: 104 (01-09-19 @ 04:59)  BP: 108/71 (01-09-19 @ 04:59)  BP(mean): --  RR: 18 (01-09-19 @ 04:59)  SpO2: 98% (01-09-19 @ 04:59)  Wt(kg): --    I and O's:    01-08 @ 07:01  -  01-09 @ 07:00  --------------------------------------------------------  IN: 1517.6 mL / OUT: 9600 mL / NET: -8082.4 mL    01-09 @ 07:01  -  01-09 @ 09:38  --------------------------------------------------------  IN: 0 mL / OUT: 1100 mL / NET: -1100 mL          PHYSICAL EXAM:    Constitutional: NAD  Neck:  No JVD  Respiratory: CTAB/L  Cardiovascular: S1 and S2  Gastrointestinal: BS+, soft, NT/ND  Extremities: trace  peripheral edema  Neurological: A/O x 3, no focal deficits  Psychiatric: Normal mood, normal affect  : No Gustafson  Skin: No rashes  Access: Not applicable    LABS:                        10.1   6.26  )-----------( 282      ( 08 Jan 2019 08:09 )             31.1     01-09    128<L>  |  78<L>  |  45<H>  ----------------------------<  159<H>  3.3<L>   |  33<H>  |  1.24    Ca    8.8      09 Jan 2019 06:42  Mg     2.1     01-09            Urine Studies:          RADIOLOGY & ADDITIONAL STUDIES:

## 2019-01-09 NOTE — PROGRESS NOTE ADULT - SUBJECTIVE AND OBJECTIVE BOX
EP ATTENDING    tele: sinus tach, 6 beats of NSVT    no palpitations, no syncope, less dyspnea, less edema    acetaminophen   Tablet .. 650 milliGRAM(s) Oral every 6 hours PRN  acetaminophen  IVPB .. 1000 milliGRAM(s) IV Intermittent once  aspirin enteric coated 81 milliGRAM(s) Oral daily  buMETAnide Infusion 2 mG/Hr IV Continuous <Continuous>  calcitriol   Capsule 0.5 MICROGram(s) Oral daily  calcium acetate 667 milliGRAM(s) Oral three times a day with meals  calcium carbonate 1250 mG  + Vitamin D (OsCal 500 + D) 1 Tablet(s) Oral three times a day  chlorhexidine 4% Liquid 1 Application(s) Topical <User Schedule>  dextrose 40% Gel 15 Gram(s) Oral once PRN  dextrose 5%. 1000 milliLiter(s) IV Continuous <Continuous>  dextrose 50% Injectable 25 Gram(s) IV Push once  gabapentin 200 milliGRAM(s) Oral three times a day  gabapentin 100 milliGRAM(s) Oral at bedtime  glucagon  Injectable 1 milliGRAM(s) IntraMuscular once PRN  guaiFENesin   Syrup  (Sugar-Free) 200 milliGRAM(s) Oral every 6 hours PRN  hydrALAZINE 10 milliGRAM(s) Oral every 8 hours  insulin glargine Injectable (LANTUS) 5 Unit(s) SubCutaneous at bedtime  insulin lispro (HumaLOG) corrective regimen sliding scale   SubCutaneous three times a day before meals  insulin lispro (HumaLOG) corrective regimen sliding scale   SubCutaneous at bedtime  levothyroxine 175 MICROGram(s) Oral daily  magnesium oxide 400 milliGRAM(s) Oral two times a day with meals  metolazone 5 milliGRAM(s) Oral daily  metoprolol succinate ER 12.5 milliGRAM(s) Oral daily  milrinone Infusion 0.5 MICROgram(s)/kG/Min IV Continuous <Continuous>  pantoprazole    Tablet 40 milliGRAM(s) Oral before breakfast  potassium chloride    Tablet ER 40 milliEquivalent(s) Oral daily  rifaximin 550 milliGRAM(s) Oral two times a day  spironolactone 50 milliGRAM(s) Oral two times a day                            10.1   6.26  )-----------( 282      ( 08 Jan 2019 08:09 )             31.1       01-09    128<L>  |  78<L>  |  45<H>  ----------------------------<  159<H>  3.3<L>   |  33<H>  |  1.24    Ca    8.8      09 Jan 2019 06:42  Mg     2.1     01-09        T(C): 36.9 (01-09-19 @ 04:59), Max: 36.9 (01-09-19 @ 04:59)  HR: 104 (01-09-19 @ 04:59) (96 - 104)  BP: 108/71 (01-09-19 @ 04:59) (101/65 - 110/75)  RR: 18 (01-09-19 @ 04:59) (18 - 20)  SpO2: 98% (01-09-19 @ 04:59) (98% - 100%)  Wt(kg): --    JVP 8  RRR, no murmurs  CTAB  soft nt/nd  no c/c, + 1 edema      A/P) She is a pleasant 64 y/o female PMH severe NICM (LVEF 10-15%) who had a [employer] BiV-ICD implanted at [place]. A LHC at [place] was also unremarkable. She now returns for with a severe decompensation of her NICM (JVP > 12, LE edema and a near 14 lb weight gain). Her device interrogation recently demonstrates excellent RA, RV and LV lead functions, but with frequent episodes of NSVT. She denies palpitations nor high voltage therapy. EP is now called because she is persistently tachycardic. Review of her EKGs and tele show the rhythm is sinus with adequate BiV-tracking (RBBB in V1, negative in I/L). She hasn't had any more significant NSVT despite milrinone (only 6 beats)    -continue toprol   -continue milrinone and bumex  -her sinus tachycardia is a response to milrinone. Given that her device is adequately BiV-tracking her sinus tachycardia I would not readjust her device settings  -no need for AAD  -no need to recheck ICD  -keep K above 4 and Mg above 2 given myalgias  -a future option includes turning off BiV pacing to see if she responds better. This can be addressed when she's euvolemic and I will discuss with CHF team  -f/u with [facility] after discharge   -would repeat echo when she is euvolemic to see if she would benefit from a TV repair

## 2019-01-09 NOTE — PROGRESS NOTE ADULT - SUBJECTIVE AND OBJECTIVE BOX
INTERVAL HPI/OVERNIGHT EVENTS: no new concerns. When can I go home.   Vital Signs Last 24 Hrs  T(C): 36.9 (09 Jan 2019 04:59), Max: 36.9 (09 Jan 2019 04:59)  T(F): 98.4 (09 Jan 2019 04:59), Max: 98.4 (09 Jan 2019 04:59)  HR: 104 (09 Jan 2019 04:59) (96 - 104)  BP: 108/71 (09 Jan 2019 04:59) (101/65 - 110/75)  BP(mean): --  RR: 18 (09 Jan 2019 04:59) (18 - 20)  SpO2: 98% (09 Jan 2019 04:59) (98% - 100%)  I&O's Summary    08 Jan 2019 07:01  -  09 Jan 2019 07:00  --------------------------------------------------------  IN: 1517.6 mL / OUT: 9600 mL / NET: -8082.4 mL    09 Jan 2019 07:01  -  09 Jan 2019 11:38  --------------------------------------------------------  IN: 420 mL / OUT: 1700 mL / NET: -1280 mL      MEDICATIONS  (STANDING):  acetaminophen  IVPB .. 1000 milliGRAM(s) IV Intermittent once  aspirin enteric coated 81 milliGRAM(s) Oral daily  buMETAnide Infusion 2 mG/Hr (10 mL/Hr) IV Continuous <Continuous>  calcitriol   Capsule 0.5 MICROGram(s) Oral daily  calcium acetate 667 milliGRAM(s) Oral three times a day with meals  calcium carbonate 1250 mG  + Vitamin D (OsCal 500 + D) 1 Tablet(s) Oral three times a day  chlorhexidine 4% Liquid 1 Application(s) Topical <User Schedule>  dextrose 5%. 1000 milliLiter(s) (50 mL/Hr) IV Continuous <Continuous>  dextrose 50% Injectable 25 Gram(s) IV Push once  gabapentin 200 milliGRAM(s) Oral three times a day  gabapentin 100 milliGRAM(s) Oral at bedtime  hydrALAZINE 10 milliGRAM(s) Oral every 8 hours  insulin glargine Injectable (LANTUS) 5 Unit(s) SubCutaneous at bedtime  insulin lispro (HumaLOG) corrective regimen sliding scale   SubCutaneous three times a day before meals  insulin lispro (HumaLOG) corrective regimen sliding scale   SubCutaneous at bedtime  levothyroxine 175 MICROGram(s) Oral daily  magnesium oxide 400 milliGRAM(s) Oral two times a day with meals  metolazone 5 milliGRAM(s) Oral daily  metoprolol succinate ER 12.5 milliGRAM(s) Oral daily  milrinone Infusion 0.5 MICROgram(s)/kG/Min (14.76 mL/Hr) IV Continuous <Continuous>  pantoprazole    Tablet 40 milliGRAM(s) Oral before breakfast  potassium chloride    Tablet ER 40 milliEquivalent(s) Oral daily  potassium chloride    Tablet ER 40 milliEquivalent(s) Oral every 4 hours  rifaximin 550 milliGRAM(s) Oral two times a day  spironolactone 50 milliGRAM(s) Oral two times a day    MEDICATIONS  (PRN):  acetaminophen   Tablet .. 650 milliGRAM(s) Oral every 6 hours PRN Mild Pain (1 - 3)  dextrose 40% Gel 15 Gram(s) Oral once PRN Blood Glucose LESS THAN 70 milliGRAM(s)/deciliter  glucagon  Injectable 1 milliGRAM(s) IntraMuscular once PRN Glucose LESS THAN 70 milligrams/deciliter  guaiFENesin   Syrup  (Sugar-Free) 200 milliGRAM(s) Oral every 6 hours PRN Cough    LABS:                        11.1   7.00  )-----------( 363      ( 09 Jan 2019 08:06 )             34.0     01-09    128<L>  |  78<L>  |  45<H>  ----------------------------<  159<H>  3.3<L>   |  33<H>  |  1.24    Ca    8.8      09 Jan 2019 06:42  Mg     2.1     01-09          CAPILLARY BLOOD GLUCOSE      POCT Blood Glucose.: 206 mg/dL (09 Jan 2019 07:23)  POCT Blood Glucose.: 181 mg/dL (08 Jan 2019 21:54)  POCT Blood Glucose.: 239 mg/dL (08 Jan 2019 17:22)          REVIEW OF SYSTEMS:  CONSTITUTIONAL: No fever, weight loss, or fatigue  EYES: No eye pain, visual disturbances, or discharge  ENMT:  No difficulty hearing, tinnitus, vertigo; No sinus or throat pain  NECK: No pain or stiffness  RESPIRATORY: No cough, wheezing, chills or hemoptysis; No shortness of breath  CARDIOVASCULAR: No chest pain, palpitations, dizziness, or leg swelling  GASTROINTESTINAL: No abdominal or epigastric pain. No nausea, vomiting, or hematemesis; No diarrhea or constipation. No melena or hematochezia.  GENITOURINARY: No dysuria, frequency, hematuria, or incontinence  NEUROLOGICAL: No headaches, memory loss, loss of strength, numbness, or tremors  Consultant(s) Notes Reviewed:  [x ] YES  [ ] NO    PHYSICAL EXAM:  GENERAL: NAD, well-groomed, well-developed ,not in any distress ,  HEAD:  Atraumatic, Normocephalic  NECK: Supple, No JVD, Normal thyroid  NERVOUS SYSTEM:  Alert & Oriented X3, No focal deficit   CHEST/LUNG: Good air entry bilateral with no  rales, rhonchi, wheezing, or rubs  HEART: Regular rate and rhythm; No murmurs, rubs, or gallops  ABDOMEN: Soft, Nontender, Nondistended; Bowel sounds present  EXTREMITIES:  2+ Peripheral Pulses, No clubbing, cyanosis, or edema    Care Discussed with Consultants/Other Providers [ x] YES  [ ] NO

## 2019-01-09 NOTE — PROGRESS NOTE ADULT - SUBJECTIVE AND OBJECTIVE BOX
S: no chest pain or sob         acetaminophen   Tablet .. 650 milliGRAM(s) Oral every 6 hours PRN  acetaminophen  IVPB .. 1000 milliGRAM(s) IV Intermittent once  aspirin enteric coated 81 milliGRAM(s) Oral daily  buMETAnide Infusion 2 mG/Hr IV Continuous <Continuous>  calcitriol   Capsule 0.5 MICROGram(s) Oral daily  calcium acetate 667 milliGRAM(s) Oral three times a day with meals  calcium carbonate 1250 mG  + Vitamin D (OsCal 500 + D) 1 Tablet(s) Oral three times a day  chlorhexidine 4% Liquid 1 Application(s) Topical <User Schedule>  dextrose 40% Gel 15 Gram(s) Oral once PRN  dextrose 5%. 1000 milliLiter(s) IV Continuous <Continuous>  dextrose 50% Injectable 25 Gram(s) IV Push once  gabapentin 200 milliGRAM(s) Oral three times a day  gabapentin 100 milliGRAM(s) Oral at bedtime  glucagon  Injectable 1 milliGRAM(s) IntraMuscular once PRN  guaiFENesin   Syrup  (Sugar-Free) 200 milliGRAM(s) Oral every 6 hours PRN  hydrALAZINE 10 milliGRAM(s) Oral every 8 hours  insulin glargine Injectable (LANTUS) 10 Unit(s) SubCutaneous at bedtime  insulin lispro (HumaLOG) corrective regimen sliding scale   SubCutaneous three times a day before meals  insulin lispro (HumaLOG) corrective regimen sliding scale   SubCutaneous at bedtime  levothyroxine 175 MICROGram(s) Oral daily  magnesium oxide 400 milliGRAM(s) Oral two times a day with meals  metolazone 5 milliGRAM(s) Oral daily  metoprolol succinate ER 12.5 milliGRAM(s) Oral daily  milrinone Infusion 0.5 MICROgram(s)/kG/Min IV Continuous <Continuous>  pantoprazole    Tablet 40 milliGRAM(s) Oral before breakfast  potassium chloride    Tablet ER 40 milliEquivalent(s) Oral daily  potassium chloride    Tablet ER 40 milliEquivalent(s) Oral every 4 hours  rifaximin 550 milliGRAM(s) Oral two times a day  spironolactone 50 milliGRAM(s) Oral two times a day                            11.1   7.00  )-----------( 363      ( 09 Jan 2019 08:06 )             34.0       01-09    128<L>  |  78<L>  |  45<H>  ----------------------------<  159<H>  3.3<L>   |  33<H>  |  1.24    Ca    8.8      09 Jan 2019 06:42  Mg     2.1     01-09              T(C): 36.2 (01-09-19 @ 11:41), Max: 36.9 (01-09-19 @ 04:59)  HR: 99 (01-09-19 @ 11:41) (96 - 104)  BP: 115/75 (01-09-19 @ 11:41) (101/65 - 115/75)  RR: 18 (01-09-19 @ 11:41) (18 - 20)  SpO2: 99% (01-09-19 @ 11:41) (98% - 100%)  Wt(kg): --    I&O's Summary    08 Jan 2019 07:01  -  09 Jan 2019 07:00  --------------------------------------------------------  IN: 1517.6 mL / OUT: 9600 mL / NET: -8082.4 mL    09 Jan 2019 07:01  -  09 Jan 2019 11:43  --------------------------------------------------------  IN: 420 mL / OUT: 1700 mL / NET: -1280 mL            HEENT:  (-)icterus (-)pallor  CV: N S1 S2 1/6 ROSALINDA (+)2 Pulses B/l  Resp:  Clear to ausculatation B/L, normal effort  GI: (+) BS Soft, NT, ND  Lymph:  (+) trace Edema, (-)obvious lymphadenopathy  Skin: Warm to touch, Normal turgor  Psych: Appropriate mood and affect        ECG:    A sense V paced	    RADIOLOGY:         CXR:  No infiltartes     ASSESSMENT/PLAN: 	63y Female  F with PMHx of NICM s/p AICD, HTN, moderate-severe MR, reportedly recent LHC at [place] was negative, thyroid cancer s/p thyroidectomy, DM,  admitted with decompensated acute on chronic systolic heart failure.    -no anginal symptoms currently  -volume status improving  -diuresis per heart failure and cardiology  -no urgent ischemic evaluation needed at this time  -eventual TTE when euvolemic to assess valvular heart disease    Kunal Muller MD

## 2019-01-09 NOTE — PROGRESS NOTE ADULT - ATTENDING COMMENTS
Patient seen and examined, agree with above assessment and plan as transcribed above.    - Volume status much improved  - Can likely change to PO Bumex in next 24 hrs  - Biventricular failure cont milrinone    Augusto Grimes MD, FACC

## 2019-01-09 NOTE — PROGRESS NOTE ADULT - ASSESSMENT
ASSESSMENT/RECOMMEND  A 63-year-old female with past medical history of hypertension, diabetes, chronic kidney disease stage III, presents with grossly volume overloaded state.  She has hypervolemic, hyponatremia and sp  aggressive diuresis.  Hypoparathyroidism  Hypokalemia and hyponatremia    Cardiology.  On Bumex drip with good urine output; on primacor gtt.  We are approaching dry wt and can likely dc both gtt soon.  I suspect her dry wt 175?  Excellent urine output  Renal.  Serum sodium stable; no need for Samsca at present.  Avoid excessive amounts of liquids without calories.  Encourage protein intake. On KCl 40 meq PO daily but additional Kdur 40 x 2   Endo.  Continue with Synthroid.  Once she is out of heart failure, the dose of Synthroid may need to be adjusted.  Unclear how much medication she is absorbing as she has gut edema as well.  Calcium is improving as well     [facility]  [phone]

## 2019-01-10 LAB
ANION GAP SERPL CALC-SCNC: 18 MMOL/L — HIGH (ref 5–17)
BUN SERPL-MCNC: 51 MG/DL — HIGH (ref 7–23)
CALCIUM SERPL-MCNC: 9.1 MG/DL — SIGNIFICANT CHANGE UP (ref 8.4–10.5)
CHLORIDE SERPL-SCNC: 78 MMOL/L — LOW (ref 96–108)
CO2 SERPL-SCNC: 31 MMOL/L — SIGNIFICANT CHANGE UP (ref 22–31)
CREAT SERPL-MCNC: 1.08 MG/DL — SIGNIFICANT CHANGE UP (ref 0.5–1.3)
GLUCOSE BLDC GLUCOMTR-MCNC: 138 MG/DL — HIGH (ref 70–99)
GLUCOSE BLDC GLUCOMTR-MCNC: 211 MG/DL — HIGH (ref 70–99)
GLUCOSE BLDC GLUCOMTR-MCNC: 292 MG/DL — HIGH (ref 70–99)
GLUCOSE BLDC GLUCOMTR-MCNC: 293 MG/DL — HIGH (ref 70–99)
GLUCOSE SERPL-MCNC: 120 MG/DL — HIGH (ref 70–99)
HCT VFR BLD CALC: 38.2 % — SIGNIFICANT CHANGE UP (ref 34.5–45)
HGB BLD-MCNC: 12.4 G/DL — SIGNIFICANT CHANGE UP (ref 11.5–15.5)
MAGNESIUM SERPL-MCNC: 1.9 MG/DL — SIGNIFICANT CHANGE UP (ref 1.6–2.6)
MCHC RBC-ENTMCNC: 22.3 PG — LOW (ref 27–34)
MCHC RBC-ENTMCNC: 32.5 GM/DL — SIGNIFICANT CHANGE UP (ref 32–36)
MCV RBC AUTO: 68.7 FL — LOW (ref 80–100)
PLATELET # BLD AUTO: 393 K/UL — SIGNIFICANT CHANGE UP (ref 150–400)
POTASSIUM SERPL-MCNC: 3.7 MMOL/L — SIGNIFICANT CHANGE UP (ref 3.5–5.3)
POTASSIUM SERPL-SCNC: 3.7 MMOL/L — SIGNIFICANT CHANGE UP (ref 3.5–5.3)
RBC # BLD: 5.56 M/UL — HIGH (ref 3.8–5.2)
RBC # FLD: 22.3 % — HIGH (ref 10.3–14.5)
SODIUM SERPL-SCNC: 127 MMOL/L — LOW (ref 135–145)
WBC # BLD: 7.24 K/UL — SIGNIFICANT CHANGE UP (ref 3.8–10.5)
WBC # FLD AUTO: 7.24 K/UL — SIGNIFICANT CHANGE UP (ref 3.8–10.5)

## 2019-01-10 RX ORDER — INSULIN LISPRO 100/ML
3 VIAL (ML) SUBCUTANEOUS
Qty: 0 | Refills: 0 | Status: DISCONTINUED | OUTPATIENT
Start: 2019-01-10 | End: 2019-01-13

## 2019-01-10 RX ORDER — BUMETANIDE 0.25 MG/ML
4 INJECTION INTRAMUSCULAR; INTRAVENOUS
Qty: 0 | Refills: 0 | Status: DISCONTINUED | OUTPATIENT
Start: 2019-01-10 | End: 2019-01-15

## 2019-01-10 RX ORDER — POTASSIUM CHLORIDE 20 MEQ
40 PACKET (EA) ORAL ONCE
Qty: 0 | Refills: 0 | Status: COMPLETED | OUTPATIENT
Start: 2019-01-10 | End: 2019-01-10

## 2019-01-10 RX ORDER — INSULIN GLARGINE 100 [IU]/ML
13 INJECTION, SOLUTION SUBCUTANEOUS AT BEDTIME
Qty: 0 | Refills: 0 | Status: DISCONTINUED | OUTPATIENT
Start: 2019-01-10 | End: 2019-01-10

## 2019-01-10 RX ORDER — INSULIN GLARGINE 100 [IU]/ML
10 INJECTION, SOLUTION SUBCUTANEOUS AT BEDTIME
Qty: 0 | Refills: 0 | Status: DISCONTINUED | OUTPATIENT
Start: 2019-01-10 | End: 2019-01-13

## 2019-01-10 RX ADMIN — Medication 1 TABLET(S): at 05:43

## 2019-01-10 RX ADMIN — Medication 667 MILLIGRAM(S): at 07:37

## 2019-01-10 RX ADMIN — MAGNESIUM OXIDE 400 MG ORAL TABLET 400 MILLIGRAM(S): 241.3 TABLET ORAL at 18:06

## 2019-01-10 RX ADMIN — MAGNESIUM OXIDE 400 MG ORAL TABLET 400 MILLIGRAM(S): 241.3 TABLET ORAL at 07:36

## 2019-01-10 RX ADMIN — CALCITRIOL 0.5 MICROGRAM(S): 0.5 CAPSULE ORAL at 12:20

## 2019-01-10 RX ADMIN — CHLORHEXIDINE GLUCONATE 1 APPLICATION(S): 213 SOLUTION TOPICAL at 05:37

## 2019-01-10 RX ADMIN — PANTOPRAZOLE SODIUM 40 MILLIGRAM(S): 20 TABLET, DELAYED RELEASE ORAL at 05:42

## 2019-01-10 RX ADMIN — Medication 3 UNIT(S): at 18:05

## 2019-01-10 RX ADMIN — Medication 667 MILLIGRAM(S): at 12:19

## 2019-01-10 RX ADMIN — Medication 10 MILLIGRAM(S): at 13:38

## 2019-01-10 RX ADMIN — BUMETANIDE 10 MG/HR: 0.25 INJECTION INTRAMUSCULAR; INTRAVENOUS at 07:36

## 2019-01-10 RX ADMIN — Medication 81 MILLIGRAM(S): at 12:20

## 2019-01-10 RX ADMIN — BUMETANIDE 4 MILLIGRAM(S): 0.25 INJECTION INTRAMUSCULAR; INTRAVENOUS at 13:40

## 2019-01-10 RX ADMIN — Medication 10 MILLIGRAM(S): at 21:33

## 2019-01-10 RX ADMIN — Medication 40 MILLIEQUIVALENT(S): at 13:28

## 2019-01-10 RX ADMIN — Medication 175 MICROGRAM(S): at 05:42

## 2019-01-10 RX ADMIN — Medication 12.5 MILLIGRAM(S): at 05:42

## 2019-01-10 RX ADMIN — Medication 3: at 18:06

## 2019-01-10 RX ADMIN — Medication 667 MILLIGRAM(S): at 18:08

## 2019-01-10 RX ADMIN — Medication 1 TABLET(S): at 18:08

## 2019-01-10 RX ADMIN — GABAPENTIN 200 MILLIGRAM(S): 400 CAPSULE ORAL at 05:42

## 2019-01-10 RX ADMIN — INSULIN GLARGINE 10 UNIT(S): 100 INJECTION, SOLUTION SUBCUTANEOUS at 22:08

## 2019-01-10 RX ADMIN — GABAPENTIN 200 MILLIGRAM(S): 400 CAPSULE ORAL at 13:38

## 2019-01-10 RX ADMIN — Medication 3: at 12:18

## 2019-01-10 RX ADMIN — Medication 10 MILLIGRAM(S): at 05:43

## 2019-01-10 RX ADMIN — GABAPENTIN 100 MILLIGRAM(S): 400 CAPSULE ORAL at 21:33

## 2019-01-10 RX ADMIN — GABAPENTIN 200 MILLIGRAM(S): 400 CAPSULE ORAL at 21:33

## 2019-01-10 RX ADMIN — MILRINONE LACTATE 14.76 MICROGRAM(S)/KG/MIN: 1 INJECTION, SOLUTION INTRAVENOUS at 07:36

## 2019-01-10 RX ADMIN — SPIRONOLACTONE 50 MILLIGRAM(S): 25 TABLET, FILM COATED ORAL at 05:43

## 2019-01-10 RX ADMIN — MILRINONE LACTATE 14.76 MICROGRAM(S)/KG/MIN: 1 INJECTION, SOLUTION INTRAVENOUS at 21:32

## 2019-01-10 RX ADMIN — SPIRONOLACTONE 50 MILLIGRAM(S): 25 TABLET, FILM COATED ORAL at 18:08

## 2019-01-10 NOTE — PHARMACOTHERAPY INTERVENTION NOTE - COMMENTS
Patient with DM on Tradjenta at home, now with hyperglycemia past few days, initiated on Lantus, received 10 units last night. BG today 138, 292. Recommended keep Lantus 10 units and add Humalog 2 units TID AC to better control postprandial BG.

## 2019-01-10 NOTE — PROGRESS NOTE ADULT - SUBJECTIVE AND OBJECTIVE BOX
[facility] [phone]        Patient seen and examined in bed.  She felt well  Diuresing well        MEDICATIONS  (STANDING):  acetaminophen  IVPB .. 1000 milliGRAM(s) IV Intermittent once  aspirin enteric coated 81 milliGRAM(s) Oral daily  buMETAnide Infusion 2 mG/Hr (10 mL/Hr) IV Continuous <Continuous>  calcitriol   Capsule 0.5 MICROGram(s) Oral daily  calcium acetate 667 milliGRAM(s) Oral three times a day with meals  calcium carbonate 1250 mG  + Vitamin D (OsCal 500 + D) 1 Tablet(s) Oral three times a day  chlorhexidine 4% Liquid 1 Application(s) Topical <User Schedule>  dextrose 5%. 1000 milliLiter(s) (50 mL/Hr) IV Continuous <Continuous>  dextrose 50% Injectable 25 Gram(s) IV Push once  gabapentin 200 milliGRAM(s) Oral three times a day  gabapentin 100 milliGRAM(s) Oral at bedtime  hydrALAZINE 10 milliGRAM(s) Oral every 8 hours  insulin glargine Injectable (LANTUS) 10 Unit(s) SubCutaneous at bedtime  insulin lispro (HumaLOG) corrective regimen sliding scale   SubCutaneous three times a day before meals  insulin lispro (HumaLOG) corrective regimen sliding scale   SubCutaneous at bedtime  levothyroxine 175 MICROGram(s) Oral daily  magnesium oxide 400 milliGRAM(s) Oral two times a day with meals  metolazone 5 milliGRAM(s) Oral daily  metoprolol succinate ER 12.5 milliGRAM(s) Oral daily  milrinone Infusion 0.5 MICROgram(s)/kG/Min (14.76 mL/Hr) IV Continuous <Continuous>  pantoprazole    Tablet 40 milliGRAM(s) Oral before breakfast  potassium chloride    Tablet ER 40 milliEquivalent(s) Oral daily  rifaximin 550 milliGRAM(s) Oral two times a day  spironolactone 50 milliGRAM(s) Oral two times a day      VITAL:  T(C): , Max: 36.7 (01-10-19 @ 04:29)  T(F): , Max: 98 (01-10-19 @ 04:29)  HR: 107 (01-10-19 @ 04:29)  BP: 107/73 (01-10-19 @ 04:29)  BP(mean): --  RR: 18 (01-10-19 @ 04:29)  SpO2: 94% (01-10-19 @ 04:29)  Wt(kg): --    I and O's:    01-09 @ 07:01  -  01-10 @ 07:00  --------------------------------------------------------  IN: 2516 mL / OUT: 4950 mL / NET: -2434 mL    01-10 @ 07:01  -  01-10 @ 09:46  --------------------------------------------------------  IN: 0 mL / OUT: 700 mL / NET: -700 mL          PHYSICAL EXAM:    Constitutional: NAD  Neck:  No JVD  Respiratory: CTAB/L  Cardiovascular: S1 and S2  Gastrointestinal: BS+, soft, NT/ND  Extremities: + peripheral edema  Neurological: A/O x 3, no focal deficits  Psychiatric: Normal mood, normal affect  : No Gustafson  Skin: No rashes  Access: Not applicable    LABS:                        12.4   7.24  )-----------( 393      ( 10 Gianluca 2019 07:22 )             38.2     01-10    127<L>  |  78<L>  |  51<H>  ----------------------------<  120<H>  3.7   |  31  |  1.08    Ca    9.1      10 Gianluca 2019 06:18  Mg     1.9     01-10            Urine Studies:          RADIOLOGY & ADDITIONAL STUDIES:

## 2019-01-10 NOTE — PROGRESS NOTE ADULT - SUBJECTIVE AND OBJECTIVE BOX
INTERVAL HPI/OVERNIGHT EVENTS: I feel fine.   Vital Signs Last 24 Hrs  T(C): 36.8 (10 Gianluca 2019 13:49), Max: 36.8 (10 Gianluca 2019 13:49)  T(F): 98.3 (10 Gianluca 2019 13:49), Max: 98.3 (10 Gianluca 2019 13:49)  HR: 109 (10 Gianluca 2019 13:49) (101 - 109)  BP: 107/76 (10 Gianluca 2019 13:49) (105/64 - 110/73)  BP(mean): --  RR: 17 (10 Gianluca 2019 13:49) (17 - 18)  SpO2: 98% (10 Gianluca 2019 13:49) (94% - 98%)  I&O's Summary    09 Jan 2019 07:01  -  10 Gianluca 2019 07:00  --------------------------------------------------------  IN: 2516 mL / OUT: 4950 mL / NET: -2434 mL    10 Gianluca 2019 07:01  -  10 Gianluca 2019 14:39  --------------------------------------------------------  IN: 660 mL / OUT: 1750 mL / NET: -1090 mL      MEDICATIONS  (STANDING):  acetaminophen  IVPB .. 1000 milliGRAM(s) IV Intermittent once  aspirin enteric coated 81 milliGRAM(s) Oral daily  buMETAnide 4 milliGRAM(s) Oral two times a day  calcitriol   Capsule 0.5 MICROGram(s) Oral daily  calcium acetate 667 milliGRAM(s) Oral three times a day with meals  calcium carbonate 1250 mG  + Vitamin D (OsCal 500 + D) 1 Tablet(s) Oral two times a day  chlorhexidine 4% Liquid 1 Application(s) Topical <User Schedule>  dextrose 5%. 1000 milliLiter(s) (50 mL/Hr) IV Continuous <Continuous>  dextrose 50% Injectable 25 Gram(s) IV Push once  gabapentin 200 milliGRAM(s) Oral three times a day  gabapentin 100 milliGRAM(s) Oral at bedtime  hydrALAZINE 10 milliGRAM(s) Oral every 8 hours  insulin glargine Injectable (LANTUS) 10 Unit(s) SubCutaneous at bedtime  insulin lispro (HumaLOG) corrective regimen sliding scale   SubCutaneous three times a day before meals  insulin lispro (HumaLOG) corrective regimen sliding scale   SubCutaneous at bedtime  levothyroxine 175 MICROGram(s) Oral daily  magnesium oxide 400 milliGRAM(s) Oral two times a day with meals  metolazone 2.5 milliGRAM(s) Oral <User Schedule>  metoprolol succinate ER 12.5 milliGRAM(s) Oral daily  milrinone Infusion 0.5 MICROgram(s)/kG/Min (14.76 mL/Hr) IV Continuous <Continuous>  pantoprazole    Tablet 40 milliGRAM(s) Oral before breakfast  rifaximin 550 milliGRAM(s) Oral two times a day  spironolactone 50 milliGRAM(s) Oral two times a day    MEDICATIONS  (PRN):  acetaminophen   Tablet .. 650 milliGRAM(s) Oral every 6 hours PRN Mild Pain (1 - 3)  dextrose 40% Gel 15 Gram(s) Oral once PRN Blood Glucose LESS THAN 70 milliGRAM(s)/deciliter  glucagon  Injectable 1 milliGRAM(s) IntraMuscular once PRN Glucose LESS THAN 70 milligrams/deciliter  guaiFENesin   Syrup  (Sugar-Free) 200 milliGRAM(s) Oral every 6 hours PRN Cough    LABS:                        12.4   7.24  )-----------( 393      ( 10 Gianluca 2019 07:22 )             38.2     01-10    127<L>  |  78<L>  |  51<H>  ----------------------------<  120<H>  3.7   |  31  |  1.08    Ca    9.1      10 Gianluca 2019 06:18  Mg     1.9     01-10          CAPILLARY BLOOD GLUCOSE      POCT Blood Glucose.: 292 mg/dL (10 Gianluca 2019 11:38)  POCT Blood Glucose.: 138 mg/dL (10 Gianluca 2019 07:09)  POCT Blood Glucose.: 263 mg/dL (09 Jan 2019 22:33)  POCT Blood Glucose.: 290 mg/dL (09 Jan 2019 21:02)  POCT Blood Glucose.: 290 mg/dL (09 Jan 2019 17:18)          REVIEW OF SYSTEMS:  CONSTITUTIONAL: No fever, weight loss, or fatigue  EYES: No eye pain, visual disturbances, or discharge  ENMT:  No difficulty hearing, tinnitus, vertigo; No sinus or throat pain  NECK: No pain or stiffness  RESPIRATORY: No cough, wheezing, chills or hemoptysis; No shortness of breath  CARDIOVASCULAR: No chest pain, palpitations, dizziness, or leg swelling  GASTROINTESTINAL: No abdominal or epigastric pain. No nausea, vomiting, or hematemesis; No diarrhea or constipation. No melena or hematochezia.  GENITOURINARY: No dysuria, frequency, hematuria, or incontinence  NEUROLOGICAL: No headaches, memory loss, loss of strength, numbness, or tremors    Consultant(s) Notes Reviewed:  [x ] YES  [ ] NO    PHYSICAL EXAM:  GENERAL: NAD, well-groomed, well-developed, not in any distress ,  HEAD:  Atraumatic, Normocephalic  EYES: EOMI, PERRLA, conjunctiva and sclera clear  NECK: Supple, No JVD, Normal thyroid  NERVOUS SYSTEM:  Alert & Oriented X3, No focal deficit   CHEST/LUNG: Good air entry bilateral with no  rales, rhonchi, wheezing, or rubs  HEART: Regular rate and rhythm; No murmurs, rubs, or gallops  ABDOMEN: Soft, Nontender, Nondistended; Bowel sounds present  EXTREMITIES:  2+ Peripheral Pulses, No clubbing, cyanosis, but less edema    Care Discussed with Consultants/Other Providers [ x] YES  [ ] NO

## 2019-01-10 NOTE — PROGRESS NOTE ADULT - SUBJECTIVE AND OBJECTIVE BOX
Subjective:  pt seen and examined, no complaints    Tele : NSR , no arrythmia     acetaminophen   Tablet .. 650 milliGRAM(s) Oral every 6 hours PRN  acetaminophen  IVPB .. 1000 milliGRAM(s) IV Intermittent once  aspirin enteric coated 81 milliGRAM(s) Oral daily  buMETAnide Infusion 2 mG/Hr IV Continuous <Continuous>  calcitriol   Capsule 0.5 MICROGram(s) Oral daily  calcium acetate 667 milliGRAM(s) Oral three times a day with meals  calcium carbonate 1250 mG  + Vitamin D (OsCal 500 + D) 1 Tablet(s) Oral three times a day  chlorhexidine 4% Liquid 1 Application(s) Topical <User Schedule>  dextrose 40% Gel 15 Gram(s) Oral once PRN  dextrose 5%. 1000 milliLiter(s) IV Continuous <Continuous>  dextrose 50% Injectable 25 Gram(s) IV Push once  gabapentin 200 milliGRAM(s) Oral three times a day  gabapentin 100 milliGRAM(s) Oral at bedtime  glucagon  Injectable 1 milliGRAM(s) IntraMuscular once PRN  guaiFENesin   Syrup  (Sugar-Free) 200 milliGRAM(s) Oral every 6 hours PRN  hydrALAZINE 10 milliGRAM(s) Oral every 8 hours  insulin glargine Injectable (LANTUS) 5 Unit(s) SubCutaneous at bedtime  insulin lispro (HumaLOG) corrective regimen sliding scale   SubCutaneous three times a day before meals  insulin lispro (HumaLOG) corrective regimen sliding scale   SubCutaneous at bedtime  levothyroxine 175 MICROGram(s) Oral daily  magnesium oxide 400 milliGRAM(s) Oral two times a day with meals  metolazone 5 milliGRAM(s) Oral daily  metoprolol succinate ER 12.5 milliGRAM(s) Oral daily  milrinone Infusion 0.5 MICROgram(s)/kG/Min IV Continuous <Continuous>  pantoprazole    Tablet 40 milliGRAM(s) Oral before breakfast  potassium chloride    Tablet ER 40 milliEquivalent(s) Oral daily  rifaximin 550 milliGRAM(s) Oral two times a day  spironolactone 50 milliGRAM(s) Oral two times a day                            10.1   6.26  )-----------( 282      ( 08 Jan 2019 08:09 )             31.1       Hemoglobin: 10.1 g/dL (01-08 @ 08:09)  Hemoglobin: 9.6 g/dL (01-07 @ 07:56)  Hemoglobin: 9.4 g/dL (01-06 @ 06:08)  Hemoglobin: 9.2 g/dL (01-05 @ 07:56)  Hemoglobin: 9.6 g/dL (01-04 @ 08:17)      01-08    127<L>  |  78<L>  |  39<H>  ----------------------------<  247<H>  3.3<L>   |  31  |  1.16    Ca    7.9<L>      08 Jan 2019 17:57  Mg     1.7     01-08      Creatinine Trend: 1.16<--, 1.08<--, 1.09<--, 1.09<--, 1.21<--, 1.15<--    COAGS:           T(C): 36.9 (01-09-19 @ 04:59), Max: 36.9 (01-09-19 @ 04:59)  HR: 104 (01-09-19 @ 04:59) (96 - 104)  BP: 108/71 (01-09-19 @ 04:59) (101/65 - 110/75)  RR: 18 (01-09-19 @ 04:59) (18 - 20)  SpO2: 98% (01-09-19 @ 04:59) (98% - 100%)  Wt(kg): --    I&O's Summary    08 Jan 2019 07:01  -  09 Jan 2019 07:00  --------------------------------------------------------  IN: 1517.6 mL / OUT: 9600 mL / NET: -8082.4 mL      	    HEENT:  (-)icterus (-)pallor  CV: N S1 S2 1/6 ROSALINDA (+)2 Pulses B/l  Resp:  Clear to ausculatation B/L, normal effort  GI: (+) BS Soft, NT, ND  Lymph:  (-)Edema, (-)obvious lymphadenopathy  Skin: Warm to touch, Normal turgor  Psych: Appropriate mood and affect        ECG:    A sense V paced	    RADIOLOGY:         CXR:  No infiltartes     ASSESSMENT/PLAN: 	63y Female  F with PMHx of NICM s/p AICD, HTN, moderate-severe MR, reportedly recent LHC at [place] was negative, thyroid cancer s/p thyroidectomy, DM,  admitted with decompensated acute on chronic systolic heart failure.    - ASA,  statin   - tele stable / paced   - tolerating aggressive diuresis with primacor and bumex gtt , zaroxlyn   - pt tolerating low dose BB with primacor gtt   - heart failure follow up   - cont hydralizine , aldactone BP stable   - CBC remains stable    - renal follow up  -  GI / DVT prophylaxis,  keep K>4, mag >2.0

## 2019-01-10 NOTE — PROGRESS NOTE ADULT - ATTENDING COMMENTS
Patient seen and examined, agree with above assessment and plan as transcribed above.    - transition to PO Bumex  - cont afterload reducers      Augusto Grimes MD, FACC

## 2019-01-10 NOTE — PROGRESS NOTE ADULT - SUBJECTIVE AND OBJECTIVE BOX
Subjective:  patient seent/examined.  dyspnea improved.  no chest pain.  	  MEDICATIONS:  MEDICATIONS  (STANDING):  acetaminophen  IVPB .. 1000 milliGRAM(s) IV Intermittent once  aspirin enteric coated 81 milliGRAM(s) Oral daily  buMETAnide 4 milliGRAM(s) Oral two times a day  calcitriol   Capsule 0.5 MICROGram(s) Oral daily  calcium acetate 667 milliGRAM(s) Oral three times a day with meals  calcium carbonate 1250 mG  + Vitamin D (OsCal 500 + D) 1 Tablet(s) Oral two times a day  chlorhexidine 4% Liquid 1 Application(s) Topical <User Schedule>  dextrose 5%. 1000 milliLiter(s) (50 mL/Hr) IV Continuous <Continuous>  dextrose 50% Injectable 25 Gram(s) IV Push once  gabapentin 200 milliGRAM(s) Oral three times a day  gabapentin 100 milliGRAM(s) Oral at bedtime  hydrALAZINE 10 milliGRAM(s) Oral every 8 hours  insulin glargine Injectable (LANTUS) 10 Unit(s) SubCutaneous at bedtime  insulin lispro (HumaLOG) corrective regimen sliding scale   SubCutaneous three times a day before meals  insulin lispro (HumaLOG) corrective regimen sliding scale   SubCutaneous at bedtime  insulin lispro Injectable (HumaLOG) 3 Unit(s) SubCutaneous before breakfast  insulin lispro Injectable (HumaLOG) 3 Unit(s) SubCutaneous before lunch  insulin lispro Injectable (HumaLOG) 3 Unit(s) SubCutaneous before dinner  levothyroxine 175 MICROGram(s) Oral daily  magnesium oxide 400 milliGRAM(s) Oral two times a day with meals  metolazone 2.5 milliGRAM(s) Oral <User Schedule>  metoprolol succinate ER 12.5 milliGRAM(s) Oral daily  milrinone Infusion 0.5 MICROgram(s)/kG/Min (14.76 mL/Hr) IV Continuous <Continuous>  pantoprazole    Tablet 40 milliGRAM(s) Oral before breakfast  rifaximin 550 milliGRAM(s) Oral two times a day  spironolactone 50 milliGRAM(s) Oral two times a day      LABS:	 	    CARDIAC MARKERS:                                12.4   7.24  )-----------( 393      ( 10 Gianluca 2019 07:22 )             38.2     Hemoglobin: 12.4 g/dL (01-10 @ 07:22)  Hemoglobin: 11.1 g/dL (01-09 @ 08:06)  Hemoglobin: 10.1 g/dL (01-08 @ 08:09)  Hemoglobin: 9.6 g/dL (01-07 @ 07:56)  Hemoglobin: 9.4 g/dL (01-06 @ 06:08)      01-10    127<L>  |  78<L>  |  51<H>  ----------------------------<  120<H>  3.7   |  31  |  1.08    Ca    9.1      10 Gianluca 2019 06:18  Mg     1.9     01-10      Creatinine Trend: 1.08<--, 1.12<--, 1.24<--, 1.16<--, 1.08<--, 1.09<--        PHYSICAL EXAM:  T(C): 36.8 (01-10-19 @ 13:49), Max: 36.8 (01-10-19 @ 13:49)  HR: 109 (01-10-19 @ 13:49) (101 - 109)  BP: 107/76 (01-10-19 @ 13:49) (107/73 - 110/73)  RR: 17 (01-10-19 @ 13:49) (17 - 18)  SpO2: 98% (01-10-19 @ 13:49) (94% - 98%)  Wt(kg): --  I&O's Summary    09 Jan 2019 07:01  -  10 Gianluca 2019 07:00  --------------------------------------------------------  IN: 2516 mL / OUT: 4950 mL / NET: -2434 mL    10 Gianluca 2019 07:01  -  10 Gianluca 2019 19:09  --------------------------------------------------------  IN: 660 mL / OUT: 1750 mL / NET: -1090 mL          HEENT:   Normal oral mucosa, PERRL, EOMI	  Lymphatic: No obvious lymphadenopathy , no edema  Cardiovascular: Normal S1 S2, No JVD, 1/6 ROSALINDA murmur, Peripheral pulses palpable 2+ bilaterally  Respiratory: Lungs clear to auscultation, normal effort 	  Gastrointestinal:  Soft, Non-tender, + BS	  Skin: No rashes,  No cyanosis, warm to touch  Musculoskeletal: Normal range of motion, normal strength  Psychiatry:  Appropriate Mood & affect   ext: no clubbing/cyanosis    TELEMETRY: 	          ASSESSMENT/PLAN: 	63y Female with pmhx of dm/chf now with acute on chronic systolic heart failure  1) no need for ischemic evaluation  2) patient with moderate MR--aggresive diuresis--may need repeat echo once fluid status optimized  3) continue tele

## 2019-01-10 NOTE — PROGRESS NOTE ADULT - ATTENDING COMMENTS
EP ATTENDING    Agree with above.     -continue toprol   -continue milrinone and bumex  -her sinus tachycardia is a response to milrinone. Given that her device is adequately BiV-tracking her sinus tachycardia I would not readjust her device settings  -no need for AAD  -no need to recheck ICD  -keep K above 4 and Mg above 2 given myalgias  -a future option includes turning off BiV pacing to see if she responds better. This can be addressed when she's euvolemic and I will discuss with CHF team  -f/u with [facility] after discharge   -would repeat echo when she is euvolemic to see if she would benefit from a TV repair

## 2019-01-10 NOTE — PROGRESS NOTE ADULT - PROBLEM SELECTOR PLAN 1
- Will consider transitioning Bumex gtt to PO diuretics today.  - Continue current dose of milrinone at 0.5 mcg/kg/min  - Continue metolazone 5 mg daily  - Continue hydralazine 10 mg TID, hold for SBP <90, will consider increasing today.  - Continue spironolactone 50 mg BID  - Continue Toprol XL 12.5 mg daily.  - BID BMP and Mg with aggressive diuresis  - Goal K 4-4.5, Mg 2-2.5  - Strict I/Os and daily standing weights  - Fluid restriction of 1L/day - Discontinue Bumex gtt and start Bumex 4 mg PO BID starting this evening.  - Decrease metolazone to 2.5 mg QOD. Patient received 5 mg today, so will hold dose tomorrow and start the 2.5 mg on Saturday.  - Continue current dose of milrinone at 0.5 mcg/kg/min  - Continue hydralazine 10 mg TID, hold for SBP <90, will consider increasing today.  - Continue spironolactone 50 mg BID  - Continue Toprol XL 12.5 mg daily.  - Daily BMP and Mg  - Goal K 4-4.5, Mg 2-2.5  - Strict I/Os and daily standing weights  - Fluid restriction of 1L/day

## 2019-01-10 NOTE — PROGRESS NOTE ADULT - ATTENDING COMMENTS
much improved, iglesia today 168  - switch to oral regimen and watch for 24 hs  - continue inotropes

## 2019-01-10 NOTE — PROGRESS NOTE ADULT - ASSESSMENT
63F w/ PMHx of Hfref (mod-severe MR, EF 10% 11/2018) s/p ICD  s/p  PICC  on milrinone ,h/o  thyroid CA s/p resection c/b hypoparathyroidism, HTN, DMII, p/w worsening fluid retention      Problem/Plan - 1:  ·  Problem: Acute on Chronic Systolic CHF exacerbation.  Plan: Clinically improving.   -on Milrinone infusion and  Bumex PO now  .  - Spironolactone, hydralazine and Metolazone   - Heart failure and Cardiology helping.   - strict and outs   - daily weight    Problem/Plan - 2:  ·  Problem: DM (diabetes mellitus).  Plan: -Sugars high so increased Lantus .   -Holding  oral hypoglycemics  -Start HISS, check fsg qac/qhs  -consistent carb diet.      Problem/Plan - 3:  ·  Problem: HTN (hypertension).  Plan: Well controlled.      Problem/Plan - 4:  ·  Problem: Hypothyroidism   Plan: - continue levothyroxine   - calcitriol.      Problem/Plan - 5:  ·  Problem: Hypocalcemia & Hyponatremia .  Plan: - Renal helping.   -calcium acetate   - calcium + D.      Problem/Plan - 6:  Problem: Anemia. Plan: HH stable. Work up in progress.    no active bleeding   - monitor CBC.     Problem/Plan - 7:  ·  Problem: Elevated liver function tests.  Plan:  2/2 congestive hepatopathy . LFT trending down.   - continue rifaximin.      Problem/Plan - 8:  ·  Problem: Diabetic neuropathy with Foot Pain .  Plan: - Continue Gabapentin and added extra dose at Bed time. . Podiatry consult noted.      Problem/Plan - 9:  ·  Problem: Need for prophylactic measure.  Plan: - sub q heparin.    Disposition : DC planning home .

## 2019-01-10 NOTE — PROGRESS NOTE ADULT - SUBJECTIVE AND OBJECTIVE BOX
Subjective:  - No acute events overnight.  - She has been ambulating around the nursing unit with PT and denies any CP, palpitations, dyspnea, lightheadedness, dizziness, orthopnea, or PND.    Medications:  acetaminophen   Tablet .. 650 milliGRAM(s) Oral every 6 hours PRN  acetaminophen  IVPB .. 1000 milliGRAM(s) IV Intermittent once  aspirin enteric coated 81 milliGRAM(s) Oral daily  buMETAnide Infusion 2 mG/Hr IV Continuous <Continuous>  calcitriol   Capsule 0.5 MICROGram(s) Oral daily  calcium acetate 667 milliGRAM(s) Oral three times a day with meals  calcium carbonate 1250 mG  + Vitamin D (OsCal 500 + D) 1 Tablet(s) Oral two times a day  chlorhexidine 4% Liquid 1 Application(s) Topical <User Schedule>  dextrose 40% Gel 15 Gram(s) Oral once PRN  dextrose 5%. 1000 milliLiter(s) IV Continuous <Continuous>  dextrose 50% Injectable 25 Gram(s) IV Push once  gabapentin 200 milliGRAM(s) Oral three times a day  gabapentin 100 milliGRAM(s) Oral at bedtime  glucagon  Injectable 1 milliGRAM(s) IntraMuscular once PRN  guaiFENesin   Syrup  (Sugar-Free) 200 milliGRAM(s) Oral every 6 hours PRN  hydrALAZINE 10 milliGRAM(s) Oral every 8 hours  insulin glargine Injectable (LANTUS) 10 Unit(s) SubCutaneous at bedtime  insulin lispro (HumaLOG) corrective regimen sliding scale   SubCutaneous three times a day before meals  insulin lispro (HumaLOG) corrective regimen sliding scale   SubCutaneous at bedtime  levothyroxine 175 MICROGram(s) Oral daily  magnesium oxide 400 milliGRAM(s) Oral two times a day with meals  metolazone 5 milliGRAM(s) Oral daily  metoprolol succinate ER 12.5 milliGRAM(s) Oral daily  milrinone Infusion 0.5 MICROgram(s)/kG/Min IV Continuous <Continuous>  pantoprazole    Tablet 40 milliGRAM(s) Oral before breakfast  potassium chloride    Tablet ER 40 milliEquivalent(s) Oral once  rifaximin 550 milliGRAM(s) Oral two times a day  spironolactone 50 milliGRAM(s) Oral two times a day      Physical Exam:    Vitals:  Vital Signs Last 24 Hours  T(C): 36.7 (01-10-19 @ 04:29), Max: 36.7 (01-10-19 @ 04:29)  HR: 107 (01-10-19 @ 04:29) (99 - 107)  BP: 107/73 (01-10-19 @ 04:29) (105/64 - 115/75)  RR: 18 (01-10-19 @ 04:29) (18 - 18)  SpO2: 94% (01-10-19 @ 04:29) (94% - 99%)    Weight in k.6 (01-10 @ 08:13)    I&O's Summary    2019 07:  -  10 Gianluca 2019 07:00  --------------------------------------------------------  IN: 2516 mL / OUT: 4950 mL / NET: -2434 mL    10 Gianluca 2019 07:  -  10 Gianluca 2019 11:04  --------------------------------------------------------  IN: 300 mL / OUT: 700 mL / NET: -400 mL    Tele: SR/ST  with occasional     General: No distress. Comfortable.  HEENT: EOM intact.  Neck: Neck supple. JVP 8 cm H2O, no HJR. No masses  Chest: Clear to auscultation bilaterally  CV: RRR. Normal S1 and S2. II/VI SM. Radial pulses normal. Tense trace BLE nonpitting edema to ankles.  Abdomen: Soft, non-distended, non-tender  Skin: No rashes or skin breakdown  Neurology: Alert and oriented times three. Sensation intact  Psych: Affect normal    Labs:                        12.4   7.24  )-----------( 393      ( 10 Gianluca 2019 07:22 )             38.2     01-10    127<L>  |  78<L>  |  51<H>  ----------------------------<  120<H>  3.7   |  31  |  1.08    Ca    9.1      10 Gianluca 2019 06:18  Mg     1.9     01-10

## 2019-01-10 NOTE — PROGRESS NOTE ADULT - ASSESSMENT
ASSESSMENT/RECOMMEND  A 63-year-old female with past medical history of hypertension, diabetes, chronic kidney disease stage III, presents with grossly volume overloaded state.  She has hypervolemic, hyponatremia and sp  aggressive diuresis.  Hypoparathyroidism  Hypokalemia and hyponatremia    Cardiology.  On Bumex drip with good urine output; on primacor gtt.  We are approaching dry wt.    Excellent urine output.  Recent wt was 168  Renal.  Serum sodium stable; no need for Samsca at present.  Avoid excessive amounts of liquids without calories.  Encourage protein intake. On KCl 40 meq PO daily but additional Kdur 40 x 1   Endo.  Continue with Synthroid.  Once she is out of heart failure, the dose of Synthroid may need to be adjusted.     Calcium is improving as well.  Reduce calcium bid     [facility]  [phone]

## 2019-01-10 NOTE — PROGRESS NOTE ADULT - ASSESSMENT
63 year old  woman with NICM (EF 10%, LVIDD 6.1cm, dx 2013) s/p CRT-D (6/2017), Mod-Severe MR, Severe TR, HTN, HLD, DM2, and Thyroid Cancer s/p resection c/b hypoparathyroidism with chronic hypocalcemia presents with SOB and weight gain 2/2 acute on chronic heart failure exacerbation. Currently diuresing well on Bumex gtt with 24 hr total UOP 5L, net negative 2.4L, weight is downtrending (168.8lbs today down from 209.2lbs on admission), and appears close to euvolemic on exam. Hypervolemic hyponatremia gradually improving with diuresis. Renal function stable. Normotensive, tolerating low dose vasodilator. 63 year old  woman with NICM (EF 10%, LVIDD 6.1cm, dx 2013) s/p CRT-D (6/2017), Mod-Severe MR, Severe TR, HTN, HLD, DM2, and Thyroid Cancer s/p resection c/b hypoparathyroidism with chronic hypocalcemia presents with SOB and weight gain 2/2 acute on chronic heart failure exacerbation. Currently diuresing well on Bumex gtt with 24 hr total UOP 5L, net negative 2.4L, weight is downtrending (168.8lbs today down from 209.2lbs on admission), and appears euvolemic on exam. Hypervolemic hyponatremia gradually improving with diuresis. Renal function stable. Normotensive, tolerating low dose vasodilator. 63 year old  woman with NICM (EF 10%, LVIDD 6.1cm, dx 2013) s/p CRT-D (6/2017), Mod-Severe MR, Severe TR, HTN, HLD, DM2, and Thyroid Cancer s/p resection c/b hypoparathyroidism with chronic hypocalcemia presents with SOB and weight gain 2/2 acute on chronic heart failure exacerbation. Currently diuresing well on Bumex gtt with 24 hr total UOP 5L, net negative 2.4L, weight is close to her dry weight of 167 lbs (168.8lbs today down from 209.2lbs on admission), and appears euvolemic on exam. Hypervolemic hyponatremia gradually improving with diuresis. Renal function stable. Normotensive, tolerating low dose vasodilator.

## 2019-01-10 NOTE — PROGRESS NOTE ADULT - SUBJECTIVE AND OBJECTIVE BOX
Subjective:  pt seen and examined, no complaints    Tele : NSR/ 's, no arrythmia     acetaminophen   Tablet .. 650 milliGRAM(s) Oral every 6 hours PRN  acetaminophen  IVPB .. 1000 milliGRAM(s) IV Intermittent once  aspirin enteric coated 81 milliGRAM(s) Oral daily  buMETAnide Infusion 2 mG/Hr IV Continuous <Continuous>  calcitriol   Capsule 0.5 MICROGram(s) Oral daily  calcium acetate 667 milliGRAM(s) Oral three times a day with meals  calcium carbonate 1250 mG  + Vitamin D (OsCal 500 + D) 1 Tablet(s) Oral three times a day  chlorhexidine 4% Liquid 1 Application(s) Topical <User Schedule>  dextrose 40% Gel 15 Gram(s) Oral once PRN  dextrose 5%. 1000 milliLiter(s) IV Continuous <Continuous>  dextrose 50% Injectable 25 Gram(s) IV Push once  gabapentin 200 milliGRAM(s) Oral three times a day  gabapentin 100 milliGRAM(s) Oral at bedtime  glucagon  Injectable 1 milliGRAM(s) IntraMuscular once PRN  guaiFENesin   Syrup  (Sugar-Free) 200 milliGRAM(s) Oral every 6 hours PRN  hydrALAZINE 10 milliGRAM(s) Oral every 8 hours  insulin glargine Injectable (LANTUS) 10 Unit(s) SubCutaneous at bedtime  insulin lispro (HumaLOG) corrective regimen sliding scale   SubCutaneous three times a day before meals  insulin lispro (HumaLOG) corrective regimen sliding scale   SubCutaneous at bedtime  levothyroxine 175 MICROGram(s) Oral daily  magnesium oxide 400 milliGRAM(s) Oral two times a day with meals  metolazone 5 milliGRAM(s) Oral daily  metoprolol succinate ER 12.5 milliGRAM(s) Oral daily  milrinone Infusion 0.5 MICROgram(s)/kG/Min IV Continuous <Continuous>  pantoprazole    Tablet 40 milliGRAM(s) Oral before breakfast  potassium chloride    Tablet ER 40 milliEquivalent(s) Oral daily  rifaximin 550 milliGRAM(s) Oral two times a day  spironolactone 50 milliGRAM(s) Oral two times a day                            11.1   7.00  )-----------( 363      ( 09 Jan 2019 08:06 )             34.0       Hemoglobin: 11.1 g/dL (01-09 @ 08:06)  Hemoglobin: 10.1 g/dL (01-08 @ 08:09)  Hemoglobin: 9.6 g/dL (01-07 @ 07:56)  Hemoglobin: 9.4 g/dL (01-06 @ 06:08)  Hemoglobin: 9.2 g/dL (01-05 @ 07:56)      01-09    124<L>  |  76<L>  |  50<H>  ----------------------------<  316<H>  3.9   |  29  |  1.12    Ca    8.8      09 Jan 2019 19:14  Mg     1.8     01-09      Creatinine Trend: 1.12<--, 1.24<--, 1.16<--, 1.08<--, 1.09<--, 1.09<--    COAGS:           T(C): 36.6 (01-09-19 @ 21:12), Max: 36.9 (01-09-19 @ 04:59)  HR: 101 (01-09-19 @ 21:12) (99 - 105)  BP: 110/73 (01-09-19 @ 21:12) (105/64 - 115/75)  RR: 18 (01-09-19 @ 21:12) (18 - 18)  SpO2: 98% (01-09-19 @ 21:12) (98% - 99%)  Wt(kg): --    I&O's Summary    08 Jan 2019 07:01  -  09 Jan 2019 07:00  --------------------------------------------------------  IN: 1517.6 mL / OUT: 9600 mL / NET: -8082.4 mL    09 Jan 2019 07:01  -  10 Gianluca 2019 03:44  --------------------------------------------------------  IN: 2218.4 mL / OUT: 3750 mL / NET: -1531.6 mL    	    HEENT:  (-)icterus (-)pallor  CV: N S1 S2 1/6 ROSALINDA (+)2 Pulses B/l  Resp:  Clear to ausculatation B/L, normal effort  GI: (+) BS Soft, NT, ND  Lymph:  (-)Edema, (-)obvious lymphadenopathy  Skin: Warm to touch, Normal turgor  Psych: Appropriate mood and affect        ECG:    A sense V paced	    RADIOLOGY:         CXR:  No infiltartes     ASSESSMENT/PLAN: 	63y Female  F with PMHx of NICM s/p AICD, HTN, moderate-severe MR, reportedly recent LHC at [place] was negative, thyroid cancer s/p thyroidectomy, DM,  admitted with decompensated acute on chronic systolic heart failure.    - ASA,  statin   - tele stable  - pt on  aggressive diuresis with primacor and bumex gtt , zaroxlyn - good urine output, creatine stable  renal following   - pt tolerating low dose BB with primacor gtt   - heart failure follow up   - cont hydralizine , aldactone BP stable   -  GI / DVT prophylaxis,  keep K>4, mag >2.0   - pt will need TTE once euvolemic, no urgent ischemic eval at present  D/W Dr Muller Subjective:  pt seen and examined, no complaints    Tele : NSR/ 's, no arrythmia     acetaminophen   Tablet .. 650 milliGRAM(s) Oral every 6 hours PRN  acetaminophen  IVPB .. 1000 milliGRAM(s) IV Intermittent once  aspirin enteric coated 81 milliGRAM(s) Oral daily  buMETAnide Infusion 2 mG/Hr IV Continuous <Continuous>  calcitriol   Capsule 0.5 MICROGram(s) Oral daily  calcium acetate 667 milliGRAM(s) Oral three times a day with meals  calcium carbonate 1250 mG  + Vitamin D (OsCal 500 + D) 1 Tablet(s) Oral three times a day  chlorhexidine 4% Liquid 1 Application(s) Topical <User Schedule>  dextrose 40% Gel 15 Gram(s) Oral once PRN  dextrose 5%. 1000 milliLiter(s) IV Continuous <Continuous>  dextrose 50% Injectable 25 Gram(s) IV Push once  gabapentin 200 milliGRAM(s) Oral three times a day  gabapentin 100 milliGRAM(s) Oral at bedtime  glucagon  Injectable 1 milliGRAM(s) IntraMuscular once PRN  guaiFENesin   Syrup  (Sugar-Free) 200 milliGRAM(s) Oral every 6 hours PRN  hydrALAZINE 10 milliGRAM(s) Oral every 8 hours  insulin glargine Injectable (LANTUS) 10 Unit(s) SubCutaneous at bedtime  insulin lispro (HumaLOG) corrective regimen sliding scale   SubCutaneous three times a day before meals  insulin lispro (HumaLOG) corrective regimen sliding scale   SubCutaneous at bedtime  levothyroxine 175 MICROGram(s) Oral daily  magnesium oxide 400 milliGRAM(s) Oral two times a day with meals  metolazone 5 milliGRAM(s) Oral daily  metoprolol succinate ER 12.5 milliGRAM(s) Oral daily  milrinone Infusion 0.5 MICROgram(s)/kG/Min IV Continuous <Continuous>  pantoprazole    Tablet 40 milliGRAM(s) Oral before breakfast  potassium chloride    Tablet ER 40 milliEquivalent(s) Oral daily  rifaximin 550 milliGRAM(s) Oral two times a day  spironolactone 50 milliGRAM(s) Oral two times a day                            11.1   7.00  )-----------( 363      ( 09 Jan 2019 08:06 )             34.0       Hemoglobin: 11.1 g/dL (01-09 @ 08:06)  Hemoglobin: 10.1 g/dL (01-08 @ 08:09)  Hemoglobin: 9.6 g/dL (01-07 @ 07:56)  Hemoglobin: 9.4 g/dL (01-06 @ 06:08)  Hemoglobin: 9.2 g/dL (01-05 @ 07:56)      01-09    124<L>  |  76<L>  |  50<H>  ----------------------------<  316<H>  3.9   |  29  |  1.12    Ca    8.8      09 Jan 2019 19:14  Mg     1.8     01-09      Creatinine Trend: 1.12<--, 1.24<--, 1.16<--, 1.08<--, 1.09<--, 1.09<--    COAGS:           T(C): 36.6 (01-09-19 @ 21:12), Max: 36.9 (01-09-19 @ 04:59)  HR: 101 (01-09-19 @ 21:12) (99 - 105)  BP: 110/73 (01-09-19 @ 21:12) (105/64 - 115/75)  RR: 18 (01-09-19 @ 21:12) (18 - 18)  SpO2: 98% (01-09-19 @ 21:12) (98% - 99%)  Wt(kg): --    I&O's Summary    08 Jan 2019 07:01  -  09 Jan 2019 07:00  --------------------------------------------------------  IN: 1517.6 mL / OUT: 9600 mL / NET: -8082.4 mL    09 Jan 2019 07:01  -  10 Gianluca 2019 03:44  --------------------------------------------------------  IN: 2218.4 mL / OUT: 3750 mL / NET: -1531.6 mL    	    HEENT:  (-)icterus (-)pallor  CV: N S1 S2 1/6 ROSALINDA (+)2 Pulses B/l  Resp:  Clear to ausculatation B/L, normal effort  GI: (+) BS Soft, NT, ND  Lymph:  (-)Edema, (-)obvious lymphadenopathy  Skin: Warm to touch, Normal turgor  Psych: Appropriate mood and affect        ECG:    A sense V paced	    RADIOLOGY:         CXR:  No infiltartes     ASSESSMENT/PLAN: 	63y Female  F with PMHx of NICM s/p AICD, HTN, moderate-severe MR, reportedly recent LHC at [place] was negative, thyroid cancer s/p thyroidectomy, DM,  admitted with decompensated acute on chronic systolic heart failure.    - ASA,  statin   - tele stable  - pt on  aggressive diuresis with primacor and bumex gtt , zaroxlyn - good urine output, creatine stable  renal following   - pt tolerating low dose BB with primacor gtt   - heart failure follow up   - cont hydralizine , aldactone BP stable   -  GI / DVT prophylaxis,  keep K>4, mag >2.0   - pt will need TTE once euvolemic, no urgent ischemic eval at present

## 2019-01-11 ENCOUNTER — TRANSCRIPTION ENCOUNTER (OUTPATIENT)
Age: 64
End: 2019-01-11

## 2019-01-11 LAB
ANION GAP SERPL CALC-SCNC: 20 MMOL/L — HIGH (ref 5–17)
BUN SERPL-MCNC: 64 MG/DL — HIGH (ref 7–23)
CALCIUM SERPL-MCNC: 9.6 MG/DL — SIGNIFICANT CHANGE UP (ref 8.4–10.5)
CHLORIDE SERPL-SCNC: 78 MMOL/L — LOW (ref 96–108)
CO2 SERPL-SCNC: 29 MMOL/L — SIGNIFICANT CHANGE UP (ref 22–31)
CREAT SERPL-MCNC: 1.3 MG/DL — SIGNIFICANT CHANGE UP (ref 0.5–1.3)
GLUCOSE BLDC GLUCOMTR-MCNC: 192 MG/DL — HIGH (ref 70–99)
GLUCOSE BLDC GLUCOMTR-MCNC: 215 MG/DL — HIGH (ref 70–99)
GLUCOSE BLDC GLUCOMTR-MCNC: 226 MG/DL — HIGH (ref 70–99)
GLUCOSE BLDC GLUCOMTR-MCNC: 272 MG/DL — HIGH (ref 70–99)
GLUCOSE BLDC GLUCOMTR-MCNC: 350 MG/DL — HIGH (ref 70–99)
GLUCOSE SERPL-MCNC: 168 MG/DL — HIGH (ref 70–99)
HBA1C BLD-MCNC: 6.7 % — HIGH (ref 4–5.6)
POTASSIUM SERPL-MCNC: 3.5 MMOL/L — SIGNIFICANT CHANGE UP (ref 3.5–5.3)
POTASSIUM SERPL-SCNC: 3.5 MMOL/L — SIGNIFICANT CHANGE UP (ref 3.5–5.3)
SODIUM SERPL-SCNC: 127 MMOL/L — LOW (ref 135–145)

## 2019-01-11 RX ORDER — PANTOPRAZOLE SODIUM 20 MG/1
1 TABLET, DELAYED RELEASE ORAL
Qty: 30 | Refills: 0 | OUTPATIENT
Start: 2019-01-11 | End: 2019-02-09

## 2019-01-11 RX ORDER — CALCITRIOL 0.5 UG/1
1 CAPSULE ORAL
Qty: 0 | Refills: 0 | COMMUNITY
Start: 2019-01-11

## 2019-01-11 RX ORDER — LINAGLIPTIN 5 MG/1
1 TABLET, FILM COATED ORAL
Qty: 30 | Refills: 0 | OUTPATIENT
Start: 2019-01-11 | End: 2019-02-09

## 2019-01-11 RX ORDER — POTASSIUM CHLORIDE 20 MEQ
40 PACKET (EA) ORAL ONCE
Qty: 0 | Refills: 0 | Status: COMPLETED | OUTPATIENT
Start: 2019-01-11 | End: 2019-01-11

## 2019-01-11 RX ORDER — MILRINONE LACTATE 1 MG/ML
0.5 INJECTION, SOLUTION INTRAVENOUS
Qty: 1 | Refills: 0 | OUTPATIENT
Start: 2019-01-11 | End: 2019-02-09

## 2019-01-11 RX ORDER — CALCIUM ACETATE 667 MG
1 TABLET ORAL
Qty: 90 | Refills: 0 | OUTPATIENT
Start: 2019-01-11 | End: 2019-02-09

## 2019-01-11 RX ORDER — MILRINONE LACTATE 1 MG/ML
0.5 INJECTION, SOLUTION INTRAVENOUS
Qty: 0 | Refills: 0 | COMMUNITY

## 2019-01-11 RX ORDER — SPIRONOLACTONE 25 MG/1
2 TABLET, FILM COATED ORAL
Qty: 120 | Refills: 0 | OUTPATIENT
Start: 2019-01-11 | End: 2019-02-09

## 2019-01-11 RX ORDER — GABAPENTIN 400 MG/1
2 CAPSULE ORAL
Qty: 210 | Refills: 0 | OUTPATIENT
Start: 2019-01-11 | End: 2019-02-09

## 2019-01-11 RX ORDER — LINAGLIPTIN 5 MG/1
1 TABLET, FILM COATED ORAL
Qty: 0 | Refills: 0 | COMMUNITY

## 2019-01-11 RX ORDER — HYDRALAZINE HCL 50 MG
1 TABLET ORAL
Qty: 90 | Refills: 0 | OUTPATIENT
Start: 2019-01-11 | End: 2019-02-09

## 2019-01-11 RX ORDER — BUMETANIDE 0.25 MG/ML
2 INJECTION INTRAMUSCULAR; INTRAVENOUS
Qty: 0 | Refills: 0 | COMMUNITY
Start: 2019-01-11

## 2019-01-11 RX ORDER — METOPROLOL TARTRATE 50 MG
0.5 TABLET ORAL
Qty: 15 | Refills: 0 | OUTPATIENT
Start: 2019-01-11 | End: 2019-02-09

## 2019-01-11 RX ORDER — CALCITRIOL 0.5 UG/1
0.25 CAPSULE ORAL DAILY
Qty: 0 | Refills: 0 | Status: DISCONTINUED | OUTPATIENT
Start: 2019-01-11 | End: 2019-01-15

## 2019-01-11 RX ORDER — SIMETHICONE 80 MG/1
80 TABLET, CHEWABLE ORAL ONCE
Qty: 0 | Refills: 0 | Status: COMPLETED | OUTPATIENT
Start: 2019-01-11 | End: 2019-01-11

## 2019-01-11 RX ORDER — MILRINONE LACTATE 1 MG/ML
0 INJECTION, SOLUTION INTRAVENOUS
Qty: 0 | Refills: 0 | COMMUNITY

## 2019-01-11 RX ORDER — METOPROLOL TARTRATE 50 MG
0.5 TABLET ORAL
Qty: 0 | Refills: 0 | COMMUNITY

## 2019-01-11 RX ORDER — BUMETANIDE 0.25 MG/ML
3 INJECTION INTRAMUSCULAR; INTRAVENOUS
Qty: 0 | Refills: 0 | COMMUNITY

## 2019-01-11 RX ORDER — POTASSIUM CHLORIDE 20 MEQ
20 PACKET (EA) ORAL DAILY
Qty: 0 | Refills: 0 | Status: DISCONTINUED | OUTPATIENT
Start: 2019-01-11 | End: 2019-01-15

## 2019-01-11 RX ORDER — MILRINONE LACTATE 1 MG/ML
0.5 INJECTION, SOLUTION INTRAVENOUS
Qty: 20 | Refills: 0 | Status: DISCONTINUED | OUTPATIENT
Start: 2019-01-11 | End: 2019-01-15

## 2019-01-11 RX ADMIN — Medication 175 MICROGRAM(S): at 05:20

## 2019-01-11 RX ADMIN — SPIRONOLACTONE 50 MILLIGRAM(S): 25 TABLET, FILM COATED ORAL at 05:20

## 2019-01-11 RX ADMIN — Medication 667 MILLIGRAM(S): at 08:15

## 2019-01-11 RX ADMIN — Medication 3: at 12:16

## 2019-01-11 RX ADMIN — Medication 3 UNIT(S): at 12:16

## 2019-01-11 RX ADMIN — GABAPENTIN 200 MILLIGRAM(S): 400 CAPSULE ORAL at 23:19

## 2019-01-11 RX ADMIN — GABAPENTIN 200 MILLIGRAM(S): 400 CAPSULE ORAL at 13:54

## 2019-01-11 RX ADMIN — BUMETANIDE 4 MILLIGRAM(S): 0.25 INJECTION INTRAMUSCULAR; INTRAVENOUS at 17:38

## 2019-01-11 RX ADMIN — Medication 10 MILLIGRAM(S): at 23:18

## 2019-01-11 RX ADMIN — Medication 81 MILLIGRAM(S): at 12:17

## 2019-01-11 RX ADMIN — MILRINONE LACTATE 14.76 MICROGRAM(S)/KG/MIN: 1 INJECTION, SOLUTION INTRAVENOUS at 08:16

## 2019-01-11 RX ADMIN — BUMETANIDE 4 MILLIGRAM(S): 0.25 INJECTION INTRAMUSCULAR; INTRAVENOUS at 05:21

## 2019-01-11 RX ADMIN — Medication 40 MILLIEQUIVALENT(S): at 12:16

## 2019-01-11 RX ADMIN — Medication 10 MILLIGRAM(S): at 13:54

## 2019-01-11 RX ADMIN — Medication 10 MILLIGRAM(S): at 05:21

## 2019-01-11 RX ADMIN — PANTOPRAZOLE SODIUM 40 MILLIGRAM(S): 20 TABLET, DELAYED RELEASE ORAL at 05:21

## 2019-01-11 RX ADMIN — Medication 667 MILLIGRAM(S): at 12:17

## 2019-01-11 RX ADMIN — SIMETHICONE 80 MILLIGRAM(S): 80 TABLET, CHEWABLE ORAL at 05:20

## 2019-01-11 RX ADMIN — GABAPENTIN 200 MILLIGRAM(S): 400 CAPSULE ORAL at 05:20

## 2019-01-11 RX ADMIN — Medication 2: at 22:06

## 2019-01-11 RX ADMIN — Medication 3 UNIT(S): at 08:14

## 2019-01-11 RX ADMIN — Medication 2: at 17:37

## 2019-01-11 RX ADMIN — SPIRONOLACTONE 50 MILLIGRAM(S): 25 TABLET, FILM COATED ORAL at 17:39

## 2019-01-11 RX ADMIN — Medication 2: at 08:14

## 2019-01-11 RX ADMIN — MILRINONE LACTATE 11.41 MICROGRAM(S)/KG/MIN: 1 INJECTION, SOLUTION INTRAVENOUS at 17:35

## 2019-01-11 RX ADMIN — Medication 1 TABLET(S): at 05:21

## 2019-01-11 RX ADMIN — CALCITRIOL 0.25 MICROGRAM(S): 0.5 CAPSULE ORAL at 13:54

## 2019-01-11 RX ADMIN — Medication 20 MILLIEQUIVALENT(S): at 13:57

## 2019-01-11 RX ADMIN — MAGNESIUM OXIDE 400 MG ORAL TABLET 400 MILLIGRAM(S): 241.3 TABLET ORAL at 17:39

## 2019-01-11 RX ADMIN — GABAPENTIN 100 MILLIGRAM(S): 400 CAPSULE ORAL at 23:18

## 2019-01-11 RX ADMIN — Medication 12.5 MILLIGRAM(S): at 05:20

## 2019-01-11 RX ADMIN — MAGNESIUM OXIDE 400 MG ORAL TABLET 400 MILLIGRAM(S): 241.3 TABLET ORAL at 08:15

## 2019-01-11 RX ADMIN — Medication 667 MILLIGRAM(S): at 17:38

## 2019-01-11 RX ADMIN — INSULIN GLARGINE 10 UNIT(S): 100 INJECTION, SOLUTION SUBCUTANEOUS at 22:06

## 2019-01-11 RX ADMIN — CHLORHEXIDINE GLUCONATE 1 APPLICATION(S): 213 SOLUTION TOPICAL at 05:22

## 2019-01-11 RX ADMIN — Medication 3 UNIT(S): at 17:37

## 2019-01-11 RX ADMIN — Medication 1 TABLET(S): at 17:39

## 2019-01-11 NOTE — DISCHARGE NOTE ADULT - MEDICATION SUMMARY - MEDICATIONS TO STOP TAKING
I will STOP taking the medications listed below when I get home from the hospital:    bumetanide 2 mg oral tablet  -- 3 tab(s) by mouth 2 times a day I will STOP taking the medications listed below when I get home from the hospital:  None I will STOP taking the medications listed below when I get home from the hospital:    calcitriol 0.5 mcg oral capsule  -- 1 cap(s) by mouth once a day    calcium acetate 667 mg oral tablet  -- 2001 milligram(s) by mouth 3 times a day    bumetanide 2 mg oral tablet  -- 3 tab(s) by mouth 2 times a day I will STOP taking the medications listed below when I get home from the hospital:    calcium acetate 667 mg oral tablet  -- 2001 milligram(s) by mouth 3 times a day

## 2019-01-11 NOTE — DISCHARGE NOTE ADULT - MEDICATION SUMMARY - MEDICATIONS TO CHANGE
I will SWITCH the dose or number of times a day I take the medications listed below when I get home from the hospital:  None I will SWITCH the dose or number of times a day I take the medications listed below when I get home from the hospital:    potassium chloride 20 mEq oral tablet, extended release  -- 1 tab(s) by mouth once a day    bumetanide 2 mg oral tablet  -- 3 tab(s) by mouth 2 times a day

## 2019-01-11 NOTE — DISCHARGE NOTE ADULT - CARE PLAN
Principal Discharge DX:	Acute on chronic systolic heart failure  Goal:	stable  Assessment and plan of treatment:	Weigh yourself daily.  If you gain 3lbs in 3 days, or 5lbs in a week call your Health Care Provider.  Do not eat or drink foods containing more than 2000mg of salt (sodium) in your diet every day.  Call your Health Care Provider if you have any swelling or increased swelling in your feet, ankles, and/or stomach.  Take all of your medication as directed.  If you become dizzy call your Health Care Provider.  Secondary Diagnosis:	DM (diabetes mellitus)  Goal:	stable  Assessment and plan of treatment:	HgA1C this admission.  Make sure you get your HgA1c checked every three months.  If you take oral diabetes medications, check your blood glucose two times a day.  If you take insulin, check your blood glucose before meals and at bedtime.  It's important not to skip any meals.  Keep a log of your blood glucose results and always take it with you to your doctor appointments.  Keep a list of your current medications including injectables and over the counter medications and bring this medication list with you to all your doctor appointments.  If you have not seen your ophthalmologist this year call for appointment.  Check your feet daily for redness, sores, or openings. Do not self treat. If no improvement in two days call your primary care physician for an appointment.  Low blood sugar (hypoglycemia) is a blood sugar below 70mg/dl. Check your blood sugar if you feel signs/symptoms of hypoglycemia. If your blood sugar is below 70 take 15 grams of carbohydrates (ex 4 oz of apple juice, 3-4 glucose tablets, or 4-6 oz of regular soda) wait 15 minutes and repeat blood sugar to make sure it comes up above 70.  If your blood sugar is above 70 and you are due for a meal, have a meal.  If you are not due for a meal have a snack.  This snack helps keeps your blood sugar at a safe range.  Secondary Diagnosis:	HTN (hypertension)  Goal:	stable  Assessment and plan of treatment:	Follow up with your medical doctor to establish long term blood pressure treatment goals.  Secondary Diagnosis:	Hypocalcemia  Goal:	stable  Assessment and plan of treatment:	c/w meds as prescribed  Secondary Diagnosis:	Hyponatremia  Goal:	stable Principal Discharge DX:	Acute on chronic systolic heart failure  Goal:	stable  Assessment and plan of treatment:	Weigh yourself daily.  If you gain 3lbs in 3 days, or 5lbs in a week call your Health Care Provider.  Do not eat or drink foods containing more than 2000mg of salt (sodium) in your diet every day.  Call your Health Care Provider if you have any swelling or increased swelling in your feet, ankles, and/or stomach.  Take all of your medication as directed.  If you become dizzy call your Health Care Provider.  Secondary Diagnosis:	DM (diabetes mellitus)  Goal:	stable  Assessment and plan of treatment:	HgA1C this admission 6.7.  Make sure you get your HgA1c checked every three months.  If you take oral diabetes medications, check your blood glucose two times a day.  If you take insulin, check your blood glucose before meals and at bedtime.  It's important not to skip any meals.  Keep a log of your blood glucose results and always take it with you to your doctor appointments.  Keep a list of your current medications including injectables and over the counter medications and bring this medication list with you to all your doctor appointments.  If you have not seen your ophthalmologist this year call for appointment.  Check your feet daily for redness, sores, or openings. Do not self treat. If no improvement in two days call your primary care physician for an appointment.  Low blood sugar (hypoglycemia) is a blood sugar below 70mg/dl. Check your blood sugar if you feel signs/symptoms of hypoglycemia. If your blood sugar is below 70 take 15 grams of carbohydrates (ex 4 oz of apple juice, 3-4 glucose tablets, or 4-6 oz of regular soda) wait 15 minutes and repeat blood sugar to make sure it comes up above 70.  If your blood sugar is above 70 and you are due for a meal, have a meal.  If you are not due for a meal have a snack.  This snack helps keeps your blood sugar at a safe range.  Secondary Diagnosis:	HTN (hypertension)  Goal:	stable  Assessment and plan of treatment:	Follow up with your medical doctor to establish long term blood pressure treatment goals.  Secondary Diagnosis:	Hypocalcemia  Goal:	stable  Assessment and plan of treatment:	c/w meds as prescribed  Secondary Diagnosis:	Hyponatremia  Assessment and plan of treatment:	Follow up with Nephrologist within 1-2 days of discharge.

## 2019-01-11 NOTE — PROGRESS NOTE ADULT - SUBJECTIVE AND OBJECTIVE BOX
Subjective:  pt seen and examined, no complaints    Tele : NSR/ ST    acetaminophen   Tablet .. 650 milliGRAM(s) Oral every 6 hours PRN  acetaminophen  IVPB .. 1000 milliGRAM(s) IV Intermittent once  aspirin enteric coated 81 milliGRAM(s) Oral daily  buMETAnide 4 milliGRAM(s) Oral two times a day  calcitriol   Capsule 0.5 MICROGram(s) Oral daily  calcium acetate 667 milliGRAM(s) Oral three times a day with meals  calcium carbonate 1250 mG  + Vitamin D (OsCal 500 + D) 1 Tablet(s) Oral two times a day  chlorhexidine 4% Liquid 1 Application(s) Topical <User Schedule>  dextrose 40% Gel 15 Gram(s) Oral once PRN  dextrose 5%. 1000 milliLiter(s) IV Continuous <Continuous>  dextrose 50% Injectable 25 Gram(s) IV Push once  gabapentin 200 milliGRAM(s) Oral three times a day  gabapentin 100 milliGRAM(s) Oral at bedtime  glucagon  Injectable 1 milliGRAM(s) IntraMuscular once PRN  guaiFENesin   Syrup  (Sugar-Free) 200 milliGRAM(s) Oral every 6 hours PRN  hydrALAZINE 10 milliGRAM(s) Oral every 8 hours  insulin glargine Injectable (LANTUS) 10 Unit(s) SubCutaneous at bedtime  insulin lispro (HumaLOG) corrective regimen sliding scale   SubCutaneous three times a day before meals  insulin lispro (HumaLOG) corrective regimen sliding scale   SubCutaneous at bedtime  insulin lispro Injectable (HumaLOG) 3 Unit(s) SubCutaneous before breakfast  insulin lispro Injectable (HumaLOG) 3 Unit(s) SubCutaneous before lunch  insulin lispro Injectable (HumaLOG) 3 Unit(s) SubCutaneous before dinner  levothyroxine 175 MICROGram(s) Oral daily  magnesium oxide 400 milliGRAM(s) Oral two times a day with meals  metolazone 2.5 milliGRAM(s) Oral <User Schedule>  metoprolol succinate ER 12.5 milliGRAM(s) Oral daily  milrinone Infusion 0.5 MICROgram(s)/kG/Min IV Continuous <Continuous>  pantoprazole    Tablet 40 milliGRAM(s) Oral before breakfast  rifaximin 550 milliGRAM(s) Oral two times a day  spironolactone 50 milliGRAM(s) Oral two times a day                            12.4   7.24  )-----------( 393      ( 10 Gianluca 2019 07:22 )             38.2       Hemoglobin: 12.4 g/dL (01-10 @ 07:22)  Hemoglobin: 11.1 g/dL (01-09 @ 08:06)  Hemoglobin: 10.1 g/dL (01-08 @ 08:09)  Hemoglobin: 9.6 g/dL (01-07 @ 07:56)      01-11    127<L>  |  78<L>  |  64<H>  ----------------------------<  168<H>  3.5   |  29  |  1.30    Ca    9.6      11 Jan 2019 06:23  Mg     1.9     01-10      Creatinine Trend: 1.30<--, 1.08<--, 1.12<--, 1.24<--, 1.16<--, 1.08<--    COAGS:           T(C): 36.7 (01-11-19 @ 04:40), Max: 36.8 (01-10-19 @ 13:49)  HR: 114 (01-11-19 @ 04:40) (107 - 114)  BP: 113/70 (01-11-19 @ 04:40) (107/76 - 116/81)  RR: 18 (01-11-19 @ 04:40) (17 - 18)  SpO2: 98% (01-11-19 @ 04:40) (98% - 98%)  Wt(kg): --    I&O's Summary    10 Gianluca 2019 07:01  -  11 Jan 2019 07:00  --------------------------------------------------------  IN: 1237.6 mL / OUT: 3650 mL / NET: -2412.4 mL    	    HEENT:  (-)icterus (-)pallor  CV: N S1 S2 1/6 ROSALINDA (+)2 Pulses B/l  Resp:  Clear to ausculatation B/L, normal effort  GI: (+) BS Soft, NT, ND  Lymph:  (-)Edema, (-)obvious lymphadenopathy  Skin: Warm to touch, Normal turgor  Psych: Appropriate mood and affect        ECG:    A sense V paced	    RADIOLOGY:         CXR:  No infiltartes     ASSESSMENT/PLAN: 	63y Female  F with PMHx of NICM s/p AICD, HTN, moderate-severe MR, reportedly recent LHC at [place] was negative, thyroid cancer s/p thyroidectomy, DM,  admitted with decompensated acute on chronic systolic heart failure.    - ASA,  statin   - HR stable on tele   - cont diuresis on Bumex / Primacor .   - heart failure follow up   - cont hydralizine , aldactone BP stable   -  GI / DVT prophylaxis,  keep K>4, mag >2.0   - pt will need TTE once euvolemic, no urgent ischemic eval at present  D/W Dr Hallman

## 2019-01-11 NOTE — PROGRESS NOTE ADULT - ASSESSMENT
63 year old  woman with NICM (EF 10%, LVIDD 6.1cm, dx 2013) s/p CRT-D (6/2017), Mod-Severe MR, Severe TR, HTN, HLD, DM2, and Thyroid Cancer s/p resection c/b hypoparathyroidism with chronic hypocalcemia presents with SOB and weight gain 2/2 acute on chronic heart failure exacerbation. She was transitioned to oral diuretics yesterday and appears euvolemic on exam. Her weight is stable at 167.7 lbs and 24 hr total UOP 3.6L, net negative 2.4L. Hypervolemic hyponatremia gradually improving. Today she has a slight rise in her SCr at 1.30 from 1.08. She is normotensive, tolerating low dose vasodilator.

## 2019-01-11 NOTE — DISCHARGE NOTE ADULT - MEDICATION SUMMARY - MEDICATIONS TO TAKE
I will START or STAY ON the medications listed below when I get home from the hospital:    spironolactone 25 mg oral tablet  -- 2 tab(s) by mouth 2 times a day  -- Indication: For CHF exacerbation    aspirin 81 mg oral delayed release tablet  -- 1 tab(s) by mouth once a day  -- Indication: For Cad     gabapentin 100 mg oral capsule  -- 2 cap(s) by mouth 3 times a day  -- Indication: For Neuropathy     Tradjenta 5 mg oral tablet  -- 1 tab(s) by mouth once a day  -- Indication: For Diabetes     Metoprolol Succinate ER 25 mg oral tablet, extended release  -- 0.5 tab(s) by mouth once a day  -- Indication: For CHF exacerbation    metOLazone 2.5 mg oral tablet  -- 1 tab(s) by mouth 2 times a week monday and thursday  -- Indication: For CHF exacerbation    bumetanide 2 mg oral tablet  -- 2 tab(s) by mouth 2 times a day  -- Indication: For CHF exacerbation    guaiFENesin 100 mg/5 mL oral liquid  -- 10 milliliter(s) by mouth every 6 hours, As needed, Cough  -- Indication: For Cough     milrinone 200 mcg/mL-D5% intravenous solution  -- milliliter(s) intravenous  -- Indication: For Acute on chronic systolic heart failure    potassium chloride 20 mEq oral tablet, extended release  -- 1 tab(s) by mouth once a day  -- Indication: For potassium     magnesium oxide 400 mg (241.3 mg elemental magnesium) oral tablet  -- 1 tab(s) by mouth 2 times a day (with meals)  -- Indication: For magnesium     rifAXIMin 550 mg oral tablet  -- 1 tab(s) by mouth 2 times a day  -- Indication: For Acute on chronic systolic heart failure    calcium acetate 667 mg oral tablet  -- 2001 milligram(s) by mouth 3 times a day  -- Indication: For phoslo     pantoprazole 40 mg oral delayed release tablet  -- 1 tab(s) by mouth once a day (before a meal)  -- Indication: For gerd     levothyroxine 175 mcg (0.175 mg) oral tablet  -- 1 tab(s) by mouth once a day  -- Indication: For Hypothyroid     hydrALAZINE 10 mg oral tablet  -- 1 tab(s) by mouth every 8 hours  -- Indication: For HTN (hypertension)    calcium-vitamin D  -- 1 tab(s) by mouth 3 times a day  -- Indication: For Calcium     calcitriol 0.5 mcg oral capsule  -- 1 cap(s) by mouth once a day  -- Indication: For Hyocalcemia I will START or STAY ON the medications listed below when I get home from the hospital:    spironolactone 25 mg oral tablet  -- 2 tab(s) by mouth 2 times a day  -- Indication: For CHF exacerbation    aspirin 81 mg oral delayed release tablet  -- 1 tab(s) by mouth once a day  -- Indication: For Cad     gabapentin 100 mg oral capsule  -- 2 cap(s) by mouth 3 times a day  -- Indication: For Neuropathy     Tradjenta 5 mg oral tablet  -- 1 tab(s) by mouth once a day  -- Indication: For Diabetes     Metoprolol Succinate ER 25 mg oral tablet, extended release  -- 0.5 tab(s) by mouth once a day  -- Indication: For CHF exacerbation    metOLazone 2.5 mg oral tablet  -- 1 tab(s) by mouth 2 times a week monday and thursday  -- Indication: For CHF exacerbation    bumetanide 2 mg oral tablet  -- 2 tab(s) by mouth 2 times a day  -- Indication: For CHF exacerbation    guaiFENesin 100 mg/5 mL oral liquid  -- 10 milliliter(s) by mouth every 6 hours, As needed, Cough  -- Indication: For Cough     milrinone 200 mcg/mL-D5% intravenous solution  -- 0.5 mcg/kg intravenous once a day  -- Indication: For CHF exacerbation    potassium chloride 20 mEq oral tablet, extended release  -- 1 tab(s) by mouth once a day  -- Indication: For potassium     magnesium oxide 400 mg (241.3 mg elemental magnesium) oral tablet  -- 1 tab(s) by mouth 2 times a day (with meals)  -- Indication: For magnesium     rifAXIMin 550 mg oral tablet  -- 1 tab(s) by mouth 2 times a day  -- Indication: For Acute on chronic systolic heart failure    calcium acetate 667 mg oral tablet  -- 2001 milligram(s) by mouth 3 times a day  -- Indication: For phoslo     pantoprazole 40 mg oral delayed release tablet  -- 1 tab(s) by mouth once a day (before a meal)  -- Indication: For gerd     levothyroxine 175 mcg (0.175 mg) oral tablet  -- 1 tab(s) by mouth once a day  -- Indication: For Hypothyroid     hydrALAZINE 10 mg oral tablet  -- 1 tab(s) by mouth every 8 hours  -- Indication: For HTN (hypertension)    calcium-vitamin D  -- 1 tab(s) by mouth 3 times a day  -- Indication: For Calcium     calcitriol 0.5 mcg oral capsule  -- 1 cap(s) by mouth once a day  -- Indication: For Hyocalcemia I will START or STAY ON the medications listed below when I get home from the hospital:    aspirin 81 mg oral delayed release tablet  -- 1 tab(s) by mouth once a day  -- Indication: For Cad     bumetanide 2 mg oral tablet  -- 2 tab(s) by mouth 2 times a day  -- Indication: For CHF exacerbation    metOLazone 2.5 mg oral tablet  -- 1 tab(s) by mouth 2 times a week monday and thursday  -- Indication: For CHF exacerbation    guaiFENesin 100 mg/5 mL oral liquid  -- 10 milliliter(s) by mouth every 6 hours, As needed, Cough  -- Indication: For Cough     milrinone 200 mcg/mL-D5% intravenous solution  -- 0.5 mcg/kg/min  intravenous continuous   Dosing Weight 76.1 kg  -- Indication: For milrinone     potassium chloride 20 mEq oral tablet, extended release  -- 1 tab(s) by mouth once a day  -- Indication: For potassium     magnesium oxide 400 mg (241.3 mg elemental magnesium) oral tablet  -- 1 tab(s) by mouth 2 times a day (with meals)  -- Indication: For magnesium     rifAXIMin 550 mg oral tablet  -- 1 tab(s) by mouth 2 times a day  -- Indication: For Acute on chronic systolic heart failure    levothyroxine 175 mcg (0.175 mg) oral tablet  -- 1 tab(s) by mouth once a day  -- Indication: For Hypothyroid     hydrALAZINE 10 mg oral tablet  -- 1 tab(s) by mouth every 8 hours  -- Indication: For HTN (hypertension)    calcium-vitamin D  -- 1 tab(s) by mouth 3 times a day  -- Indication: For Calcium     calcitriol 0.25 mcg oral capsule  -- 1 cap(s) by mouth once a day  -- Indication: For Calcium I will START or STAY ON the medications listed below when I get home from the hospital:    spironolactone 25 mg oral tablet  -- 2 tab(s) by mouth 2 times a day  -- Indication: For Heart failure    aspirin 81 mg oral delayed release tablet  -- 1 tab(s) by mouth once a day  -- Indication: For Cad     gabapentin 100 mg oral capsule  -- 2 cap(s) by mouth 3 times a day and 1 cap at bedtime   -- Indication: For Neuropathy     Tradjenta 5 mg oral tablet  -- 1 tab(s) by mouth once a day  -- Indication: For Diabetes     Metoprolol Succinate ER 25 mg oral tablet, extended release  -- 0.5 tab(s) by mouth once a day  -- Indication: For HTN (hypertension)    bumetanide 2 mg oral tablet  -- 2 tab(s) by mouth 2 times a day  -- Indication: For CHF exacerbation    metOLazone 2.5 mg oral tablet  -- 1 tab(s) by mouth 2 times a week monday and thursday  -- Indication: For CHF exacerbation    guaiFENesin 100 mg/5 mL oral liquid  -- 10 milliliter(s) by mouth every 6 hours, As needed, Cough  -- Indication: For Cough     milrinone 200 mcg/mL-D5% intravenous solution  -- 0.5 mcg/kg/min  intravenous continuous   Dosing Weight 76.1 kg  -- Indication: For CHF exacerbation    potassium chloride 20 mEq oral tablet, extended release  -- 1 tab(s) by mouth once a day  -- Indication: For potassium     magnesium oxide 400 mg (241.3 mg elemental magnesium) oral tablet  -- 1 tab(s) by mouth 2 times a day (with meals)  -- Indication: For magnesium     rifAXIMin 550 mg oral tablet  -- 1 tab(s) by mouth 2 times a day  -- Indication: For Acute on chronic systolic heart failure    calcium acetate 667 mg oral tablet  -- 1 tab(s) by mouth 3 times a day   -- Indication: For phoslo    pantoprazole 40 mg oral delayed release tablet  -- 1 tab(s) by mouth once a day (before a meal)  -- Indication: For gerd     levothyroxine 175 mcg (0.175 mg) oral tablet  -- 1 tab(s) by mouth once a day  -- Indication: For Hypothyroid     hydrALAZINE 10 mg oral tablet  -- 1 tab(s) by mouth every 8 hours  -- Indication: For HTN (hypertension)    calcium-vitamin D  -- 1 tab(s) by mouth 2 times a day  -- Indication: For vitamin     calcitriol 0.25 mcg oral capsule  -- 1 cap(s) by mouth once a day  -- Indication: For vitamin I will START or STAY ON the medications listed below when I get home from the hospital:    spironolactone 25 mg oral tablet  -- 2 tab(s) by mouth 2 times a day  -- Indication: For Heart failure    aspirin 81 mg oral delayed release tablet  -- 1 tab(s) by mouth once a day  -- Indication: For Cad     gabapentin 100 mg oral capsule  -- 2 cap(s) by mouth 3 times a day and 1 cap at bedtime   -- Indication: For Neuropathy     Tradjenta 5 mg oral tablet  -- 1 tab(s) by mouth once a day  -- Indication: For Diabetes     Metoprolol Succinate ER 25 mg oral tablet, extended release  -- 0.5 tab(s) by mouth once a day  -- Indication: For HTN (hypertension)    bumetanide 2 mg oral tablet  -- 2 tab(s) by mouth 2 times a day  -- Indication: For CHF exacerbation    metOLazone 2.5 mg oral tablet  -- 1 tab(s) by mouth 2 times a week monday and thursday  -- Indication: For CHF exacerbation    guaiFENesin 100 mg/5 mL oral liquid  -- 10 milliliter(s) by mouth every 6 hours, As needed, Cough  -- Indication: For Cough     milrinone 200 mcg/mL-D5% intravenous solution  -- 0.5 mcg/kg/min  intravenous continuous   Dosing Weight 76.1 kg  -- Indication: For CHF exacerbation    potassium chloride 20 mEq oral tablet, extended release  -- 2 tab(s) by mouth once a day   -- Indication: For Electrolyte replenishment    magnesium oxide 400 mg (241.3 mg elemental magnesium) oral tablet  -- 1 tab(s) by mouth 2 times a day (with meals)  -- Indication: For magnesium     rifAXIMin 550 mg oral tablet  -- 1 tab(s) by mouth 2 times a day  -- Indication: For Acute on chronic systolic heart failure    calcium acetate 667 mg oral tablet  -- 1 tab(s) by mouth 3 times a day   -- Indication: For phoslo    pantoprazole 40 mg oral delayed release tablet  -- 1 tab(s) by mouth once a day (before a meal)  -- Indication: For gerd     levothyroxine 175 mcg (0.175 mg) oral tablet  -- 1 tab(s) by mouth once a day  -- Indication: For Hypothyroid     hydrALAZINE 10 mg oral tablet  -- 1 tab(s) by mouth every 8 hours  -- Indication: For HTN (hypertension)    calcium-vitamin D  -- 1 tab(s) by mouth 2 times a day  -- Indication: For vitamin     calcitriol 0.25 mcg oral capsule  -- 1 cap(s) by mouth once a day  -- Indication: For vitamin

## 2019-01-11 NOTE — DISCHARGE NOTE ADULT - PATIENT PORTAL LINK FT
You can access the [facility] Portal, offered by [facility], by registering with the following website: [email or web address]

## 2019-01-11 NOTE — DISCHARGE NOTE ADULT - PLAN OF CARE
stable Weigh yourself daily.  If you gain 3lbs in 3 days, or 5lbs in a week call your Health Care Provider.  Do not eat or drink foods containing more than 2000mg of salt (sodium) in your diet every day.  Call your Health Care Provider if you have any swelling or increased swelling in your feet, ankles, and/or stomach.  Take all of your medication as directed.  If you become dizzy call your Health Care Provider. HgA1C this admission.  Make sure you get your HgA1c checked every three months.  If you take oral diabetes medications, check your blood glucose two times a day.  If you take insulin, check your blood glucose before meals and at bedtime.  It's important not to skip any meals.  Keep a log of your blood glucose results and always take it with you to your doctor appointments.  Keep a list of your current medications including injectables and over the counter medications and bring this medication list with you to all your doctor appointments.  If you have not seen your ophthalmologist this year call for appointment.  Check your feet daily for redness, sores, or openings. Do not self treat. If no improvement in two days call your primary care physician for an appointment.  Low blood sugar (hypoglycemia) is a blood sugar below 70mg/dl. Check your blood sugar if you feel signs/symptoms of hypoglycemia. If your blood sugar is below 70 take 15 grams of carbohydrates (ex 4 oz of apple juice, 3-4 glucose tablets, or 4-6 oz of regular soda) wait 15 minutes and repeat blood sugar to make sure it comes up above 70.  If your blood sugar is above 70 and you are due for a meal, have a meal.  If you are not due for a meal have a snack.  This snack helps keeps your blood sugar at a safe range. Follow up with your medical doctor to establish long term blood pressure treatment goals. c/w meds as prescribed HgA1C this admission 6.7.  Make sure you get your HgA1c checked every three months.  If you take oral diabetes medications, check your blood glucose two times a day.  If you take insulin, check your blood glucose before meals and at bedtime.  It's important not to skip any meals.  Keep a log of your blood glucose results and always take it with you to your doctor appointments.  Keep a list of your current medications including injectables and over the counter medications and bring this medication list with you to all your doctor appointments.  If you have not seen your ophthalmologist this year call for appointment.  Check your feet daily for redness, sores, or openings. Do not self treat. If no improvement in two days call your primary care physician for an appointment.  Low blood sugar (hypoglycemia) is a blood sugar below 70mg/dl. Check your blood sugar if you feel signs/symptoms of hypoglycemia. If your blood sugar is below 70 take 15 grams of carbohydrates (ex 4 oz of apple juice, 3-4 glucose tablets, or 4-6 oz of regular soda) wait 15 minutes and repeat blood sugar to make sure it comes up above 70.  If your blood sugar is above 70 and you are due for a meal, have a meal.  If you are not due for a meal have a snack.  This snack helps keeps your blood sugar at a safe range. Follow up with Nephrologist within 1-2 days of discharge.

## 2019-01-11 NOTE — PROGRESS NOTE ADULT - SUBJECTIVE AND OBJECTIVE BOX
Subjective:  pt seen and examined, no complaints    Tele : NSR , no arrythmia     acetaminophen   Tablet .. 650 milliGRAM(s) Oral every 6 hours PRN  acetaminophen  IVPB .. 1000 milliGRAM(s) IV Intermittent once  aspirin enteric coated 81 milliGRAM(s) Oral daily  buMETAnide 4 milliGRAM(s) Oral two times a day  calcitriol   Capsule 0.25 MICROGram(s) Oral daily  calcium acetate 667 milliGRAM(s) Oral three times a day with meals  calcium carbonate 1250 mG  + Vitamin D (OsCal 500 + D) 1 Tablet(s) Oral two times a day  chlorhexidine 4% Liquid 1 Application(s) Topical <User Schedule>  dextrose 40% Gel 15 Gram(s) Oral once PRN  dextrose 5%. 1000 milliLiter(s) IV Continuous <Continuous>  dextrose 50% Injectable 25 Gram(s) IV Push once  gabapentin 200 milliGRAM(s) Oral three times a day  gabapentin 100 milliGRAM(s) Oral at bedtime  glucagon  Injectable 1 milliGRAM(s) IntraMuscular once PRN  guaiFENesin   Syrup  (Sugar-Free) 200 milliGRAM(s) Oral every 6 hours PRN  hydrALAZINE 10 milliGRAM(s) Oral every 8 hours  insulin glargine Injectable (LANTUS) 10 Unit(s) SubCutaneous at bedtime  insulin lispro (HumaLOG) corrective regimen sliding scale   SubCutaneous three times a day before meals  insulin lispro (HumaLOG) corrective regimen sliding scale   SubCutaneous at bedtime  insulin lispro Injectable (HumaLOG) 3 Unit(s) SubCutaneous before breakfast  insulin lispro Injectable (HumaLOG) 3 Unit(s) SubCutaneous before lunch  insulin lispro Injectable (HumaLOG) 3 Unit(s) SubCutaneous before dinner  levothyroxine 175 MICROGram(s) Oral daily  magnesium oxide 400 milliGRAM(s) Oral two times a day with meals  metolazone 2.5 milliGRAM(s) Oral <User Schedule>  metoprolol succinate ER 12.5 milliGRAM(s) Oral daily  milrinone Infusion 0.5 MICROgram(s)/kG/Min IV Continuous <Continuous>  pantoprazole    Tablet 40 milliGRAM(s) Oral before breakfast  potassium chloride    Tablet ER 20 milliEquivalent(s) Oral daily  rifaximin 550 milliGRAM(s) Oral two times a day  spironolactone 50 milliGRAM(s) Oral two times a day                            12.4   7.24  )-----------( 393      ( 10 Gianluca 2019 07:22 )             38.2       Hemoglobin: 12.4 g/dL (01-10 @ 07:22)  Hemoglobin: 11.1 g/dL (01-09 @ 08:06)  Hemoglobin: 10.1 g/dL (01-08 @ 08:09)  Hemoglobin: 9.6 g/dL (01-07 @ 07:56)      01-11    127<L>  |  78<L>  |  64<H>  ----------------------------<  168<H>  3.5   |  29  |  1.30    Ca    9.6      11 Jan 2019 06:23  Mg     1.9     01-10      Creatinine Trend: 1.30<--, 1.08<--, 1.12<--, 1.24<--, 1.16<--, 1.08<--    COAGS:           T(C): 36.3 (01-11-19 @ 13:33), Max: 36.8 (01-10-19 @ 20:34)  HR: 107 (01-11-19 @ 13:33) (107 - 114)  BP: 105/71 (01-11-19 @ 13:33) (105/71 - 116/81)  RR: 15 (01-11-19 @ 13:33) (15 - 18)  SpO2: 97% (01-11-19 @ 13:33) (97% - 98%)  Wt(kg): --    I&O's Summary    10 Gianluca 2019 07:01  -  11 Jan 2019 07:00  --------------------------------------------------------  IN: 1237.6 mL / OUT: 3650 mL / NET: -2412.4 mL    11 Jan 2019 07:01  -  11 Jan 2019 15:43  --------------------------------------------------------  IN: 978.4 mL / OUT: 1150 mL / NET: -171.6 mL        HEENT:  (-)icterus (-)pallor  CV: N S1 S2 1/6 ROSALINDA (+)2 Pulses B/l  Resp:  Clear to ausculatation B/L, normal effort  GI: (+) BS Soft, NT, ND  Lymph:  (-)Edema, (-)obvious lymphadenopathy  Skin: Warm to touch, Normal turgor  Psych: Appropriate mood and affect        ECG:    A sense V paced	    RADIOLOGY:         CXR:  No infiltartes     ASSESSMENT/PLAN: 	63y Female  F with PMHx of NICM s/p AICD, HTN, moderate-severe MR, reportedly recent LHC at [facility] was negative, thyroid cancer s/p thyroidectomy, DM,  admitted with decompensated acute on chronic systolic heart failure.    - ASA,  statin   - cont milrinone gtt per CHF  - tolerating PO Bumex'  - F/u with CHF and our office in 1-2 weeks    Augusto Grimes MD, FACC

## 2019-01-11 NOTE — PROGRESS NOTE ADULT - ATTENDING COMMENTS
Agree with above.   no ischemic evaluation needed at this time  eventual echo when euvolemic to evaluate valvular disease    Kunal Muller MD

## 2019-01-11 NOTE — PHARMACOTHERAPY INTERVENTION NOTE - COMMENTS
Counseled patient on discharge medication doses, indications, and possible side effects. Provided patient with medication list from [facility].

## 2019-01-11 NOTE — PROGRESS NOTE ADULT - SUBJECTIVE AND OBJECTIVE BOX
Subjective:  - No acute events overnight  - She has been ambulating around the nursing unit with PT and denies any CP, palpitations, dyspnea, lightheadedness, dizziness, orthopnea, or PND.    Medications:  acetaminophen   Tablet .. 650 milliGRAM(s) Oral every 6 hours PRN  acetaminophen  IVPB .. 1000 milliGRAM(s) IV Intermittent once  aspirin enteric coated 81 milliGRAM(s) Oral daily  buMETAnide 4 milliGRAM(s) Oral two times a day  calcitriol   Capsule 0.25 MICROGram(s) Oral daily  calcium acetate 667 milliGRAM(s) Oral three times a day with meals  calcium carbonate 1250 mG  + Vitamin D (OsCal 500 + D) 1 Tablet(s) Oral two times a day  chlorhexidine 4% Liquid 1 Application(s) Topical <User Schedule>  dextrose 40% Gel 15 Gram(s) Oral once PRN  dextrose 5%. 1000 milliLiter(s) IV Continuous <Continuous>  dextrose 50% Injectable 25 Gram(s) IV Push once  gabapentin 200 milliGRAM(s) Oral three times a day  gabapentin 100 milliGRAM(s) Oral at bedtime  glucagon  Injectable 1 milliGRAM(s) IntraMuscular once PRN  guaiFENesin   Syrup  (Sugar-Free) 200 milliGRAM(s) Oral every 6 hours PRN  hydrALAZINE 10 milliGRAM(s) Oral every 8 hours  insulin glargine Injectable (LANTUS) 10 Unit(s) SubCutaneous at bedtime  insulin lispro (HumaLOG) corrective regimen sliding scale   SubCutaneous three times a day before meals  insulin lispro (HumaLOG) corrective regimen sliding scale   SubCutaneous at bedtime  insulin lispro Injectable (HumaLOG) 3 Unit(s) SubCutaneous before breakfast  insulin lispro Injectable (HumaLOG) 3 Unit(s) SubCutaneous before lunch  insulin lispro Injectable (HumaLOG) 3 Unit(s) SubCutaneous before dinner  levothyroxine 175 MICROGram(s) Oral daily  magnesium oxide 400 milliGRAM(s) Oral two times a day with meals  metolazone 2.5 milliGRAM(s) Oral <User Schedule>  metoprolol succinate ER 12.5 milliGRAM(s) Oral daily  milrinone Infusion 0.5 MICROgram(s)/kG/Min IV Continuous <Continuous>  pantoprazole    Tablet 40 milliGRAM(s) Oral before breakfast  potassium chloride    Tablet ER 40 milliEquivalent(s) Oral once  potassium chloride    Tablet ER 20 milliEquivalent(s) Oral daily  rifaximin 550 milliGRAM(s) Oral two times a day  spironolactone 50 milliGRAM(s) Oral two times a day      Physical Exam:    Vitals:  Vital Signs Last 24 Hours  T(C): 36.7 (19 @ 04:40), Max: 36.8 (01-10-19 @ 13:49)  HR: 114 (19 @ 04:40) (107 - 114)  BP: 113/70 (19 @ 04:40) (107/76 - 116/81)  RR: 18 (19 @ 04:40) (17 - 18)  SpO2: 98% (19 @ 04:40) (98% - 98%)    Weight in k.1 ( @ 09:37)    I&O's Summary    10 Gianluca 2019 07:  -  2019 07:00  --------------------------------------------------------  IN: 1237.6 mL / OUT: 3650 mL / NET: -2412.4 mL    2019 07:  -  2019 10:48  --------------------------------------------------------  IN: 300 mL / OUT: 0 mL / NET: 300 mL    Tele: SR/ST 90-110s with occasional     General: No distress. Comfortable.  HEENT: EOM intact.  Neck: Neck supple. JVP 12 cm H2O. No masses  Chest: Clear to auscultation bilaterally  CV: RRR. Normal S1 and S2. II/VI SM. Radial pulses normal. Tense trace BLE nonpitting edema to ankles.  Abdomen: Soft, non-distended, non-tender  Skin: No rashes or skin breakdown  Neurology: Alert and oriented times three. Sensation intact  Psych: Affect normal    Labs:                        12.4   7.24  )-----------( 393      ( 10 Gianluca 2019 07:22 )             38.2     01-11    127<L>  |  78<L>  |  64<H>  ----------------------------<  168<H>  3.5   |  29  |  1.30    Ca    9.6      2019 06:23  Mg     1.9     01-10 Subjective:  - No acute events overnight  - She has been ambulating around the nursing unit with PT and denies any CP, palpitations, dyspnea, lightheadedness, dizziness, orthopnea, or PND.    Medications:  acetaminophen   Tablet .. 650 milliGRAM(s) Oral every 6 hours PRN  acetaminophen  IVPB .. 1000 milliGRAM(s) IV Intermittent once  aspirin enteric coated 81 milliGRAM(s) Oral daily  buMETAnide 4 milliGRAM(s) Oral two times a day  calcitriol   Capsule 0.25 MICROGram(s) Oral daily  calcium acetate 667 milliGRAM(s) Oral three times a day with meals  calcium carbonate 1250 mG  + Vitamin D (OsCal 500 + D) 1 Tablet(s) Oral two times a day  chlorhexidine 4% Liquid 1 Application(s) Topical <User Schedule>  dextrose 40% Gel 15 Gram(s) Oral once PRN  dextrose 5%. 1000 milliLiter(s) IV Continuous <Continuous>  dextrose 50% Injectable 25 Gram(s) IV Push once  gabapentin 200 milliGRAM(s) Oral three times a day  gabapentin 100 milliGRAM(s) Oral at bedtime  glucagon  Injectable 1 milliGRAM(s) IntraMuscular once PRN  guaiFENesin   Syrup  (Sugar-Free) 200 milliGRAM(s) Oral every 6 hours PRN  hydrALAZINE 10 milliGRAM(s) Oral every 8 hours  insulin glargine Injectable (LANTUS) 10 Unit(s) SubCutaneous at bedtime  insulin lispro (HumaLOG) corrective regimen sliding scale   SubCutaneous three times a day before meals  insulin lispro (HumaLOG) corrective regimen sliding scale   SubCutaneous at bedtime  insulin lispro Injectable (HumaLOG) 3 Unit(s) SubCutaneous before breakfast  insulin lispro Injectable (HumaLOG) 3 Unit(s) SubCutaneous before lunch  insulin lispro Injectable (HumaLOG) 3 Unit(s) SubCutaneous before dinner  levothyroxine 175 MICROGram(s) Oral daily  magnesium oxide 400 milliGRAM(s) Oral two times a day with meals  metolazone 2.5 milliGRAM(s) Oral <User Schedule>  metoprolol succinate ER 12.5 milliGRAM(s) Oral daily  milrinone Infusion 0.5 MICROgram(s)/kG/Min IV Continuous <Continuous>  pantoprazole    Tablet 40 milliGRAM(s) Oral before breakfast  potassium chloride    Tablet ER 40 milliEquivalent(s) Oral once  potassium chloride    Tablet ER 20 milliEquivalent(s) Oral daily  rifaximin 550 milliGRAM(s) Oral two times a day  spironolactone 50 milliGRAM(s) Oral two times a day      Physical Exam:    Vitals:  Vital Signs Last 24 Hours  T(C): 36.7 (19 @ 04:40), Max: 36.8 (01-10-19 @ 13:49)  HR: 114 (19 @ 04:40) (107 - 114)  BP: 113/70 (19 @ 04:40) (107/76 - 116/81)  RR: 18 (19 @ 04:40) (17 - 18)  SpO2: 98% (19 @ 04:40) (98% - 98%)    Weight in k.1 ( @ 09:37)    I&O's Summary    10 Gianluca 2019 07:  -  2019 07:00  --------------------------------------------------------  IN: 1237.6 mL / OUT: 3650 mL / NET: -2412.4 mL    2019 07:  -  2019 10:48  --------------------------------------------------------  IN: 300 mL / OUT: 0 mL / NET: 300 mL    Tele: SR/ST 90-110s with occasional     General: No distress. Comfortable.  HEENT: EOM intact.  Neck: Neck supple. JVP 8 cm H2O. No masses  Chest: Clear to auscultation bilaterally  CV: RRR. Normal S1 and S2. II/VI SM. Radial pulses normal. Tense trace BLE nonpitting edema to ankles.  Abdomen: Soft, non-distended, non-tender  Skin: No rashes or skin breakdown  Neurology: Alert and oriented times three. Sensation intact  Psych: Affect normal    Labs:                        12.4   7.24  )-----------( 393      ( 10 Gianluca 2019 07:22 )             38.2     01-11    127<L>  |  78<L>  |  64<H>  ----------------------------<  168<H>  3.5   |  29  |  1.30    Ca    9.6      2019 06:23  Mg     1.9     01-10

## 2019-01-11 NOTE — DISCHARGE NOTE ADULT - HOSPITAL COURSE
63F w/ PMHx of Hfref (mod-severe MR, EF 10% 11/2018) s/p ICD  s/p  PICC  on milrinone ,h/o  thyroid CA s/p resection c/b hypoparathyroidism, HTN, DMII, p/w worsening fluid retention . You have been followed by heart failure and diurused on admission. You are now appeared to be euvolemic an have been cleared by heart failure team for discharge with close follow up. 63F w/ PMHx of Hfref (mod-severe MR, EF 10% 11/2018) s/p ICD  s/p  PICC  on milrinone ,h/o  thyroid CA s/p resection c/b hypoparathyroidism, HTN, DMII, p/w worsening fluid retention      Problem/Plan - 1:  ·  Problem: Acute on Chronic Systolic CHF exacerbation.  Plan: Clinically improving.   -on Milrinone infusion and  Bumex PO now  .  - Spironolactone, hydralazine and Metolazone   -    Problem/Plan - 2:  ·  Problem: DM (diabetes mellitus).  Plan: -Sugars high so increased Lantus .   -Holding  oral hypoglycemics  -Start HISS, check fsg qac/qhs  -consistent carb diet.      Problem/Plan - 3:  ·  Problem: HTN (hypertension).  Plan: Well controlled.      Problem/Plan - 4:  ·  Problem: Hypothyroidism   Plan: - continue levothyroxine   - calcitriol.      Problem/Plan - 5:  ·  Problem: Hypocalcemia & Hyponatremia .  Plan: - Renal helping.   -calcium acetate   - calcium + D.      Problem/Plan - 6:  Problem: Anemia. Plan: HH stable. Work up in progress.    no active bleeding   -     Problem/Plan - 7:  ·  Problem: Elevated liver function tests.  Plan:  2/2 congestive hepatopathy . LFT trending down.   - continue rifaximin.      Problem/Plan - 8:  ·  Problem: Diabetic neuropathy with Foot Pain .  Plan: - Continue Gabapentin and added extra dose at Bed time. .    Problem/Plan - 9:  ·  Problem: Moderate to Severe MR.  Plan: - outpt follow up      Problem/Plan - 10:  ·  Problem: Need for prophylactic measure.  Plan: - sub q heparin.    Patient stable, dc home with home care outpt follow up with Cardiologist, Heart Failure and Nephrologist.

## 2019-01-11 NOTE — DISCHARGE NOTE ADULT - HOME CARE AGENCY
[facility]  [phone]  for infusion. [facility] at [address] for chha. [phone] [facility]/PICC Care [phone]. To come to hospital today 1/14 to re-hook you up to home pump & will con't home services.

## 2019-01-11 NOTE — DISCHARGE NOTE ADULT - ADDITIONAL INSTRUCTIONS
Follow-up in the Heart Failure clinic on 1/15/19 at 2:30pm. Go to entrance 1 and they are located on [address] [place] [address]. Follow-up in the Heart Failure clinic on 1/15/19 at 2:30pm. Go to entrance 1 and they are located on [address] [place] [address].  Follow up with Dr. Robbins after discharge on Tue March 12th at 9:45am  Check TSH in one to two weeks Follow-up in the Heart Failure clinic within 1 week of discharge.   Follow up with Dr. Robbins after discharge on Tue March 12th at 9:45am  Check TSH in one to two weeks  Follow up with your PMD within 2 weeks of discharge. Follow-up in the Heart Failure clinic within 1 week of discharge.   Follow up with Dr. Robbins after discharge on Tue March 12th at 9:45am  Check TSH in one to two weeks  Follow up with your PMD within 2 weeks of discharge.  Follow up with Nephrologist within 1-2 days of discharge.

## 2019-01-11 NOTE — DISCHARGE NOTE ADULT - CARE PROVIDER_API CALL
Evelyn Padron), Cardiovascular Disease; Internal Medicine  [address] [facility] [address] [place] [address]  Phone: [phone]  Fax: [phone] Evelyn Padron), Cardiovascular Disease; Internal Medicine  [address] [facility]  [address]  [place] [address]  Phone: [phone]  Fax: [phone]    Bill Santana), Adv Heart Fail Trnsplnt Cardio  [address] [place] [address]  Phone: [phone]  Fax: [phone]    Abbi Wolfe), Internal Medicine; Nephrology  [address] [address]  Phone: [phone]  Fax: [phone]

## 2019-01-11 NOTE — PROGRESS NOTE ADULT - PROBLEM SELECTOR PLAN 1
- Continue Bumex 4 mg PO BID and metolazone 2.5 mg QOD  - Continue current dose of milrinone at 0.5 mcg/kg/min  - Continue hydralazine 10 mg TID, hold for SBP <90, will consider increasing today.  - Continue spironolactone 50 mg BID  - Continue Toprol XL 12.5 mg daily.  - Daily BMP and Mg  - Goal K 4-4.5, Mg 2-2.5  - Strict I/Os and daily standing weights  - Fluid restriction of 1L/day - Continue Bumex 4 mg PO BID and metolazone 2.5 mg mondays and thursdays  - Continue current dose of milrinone at 0.5 mcg/kg/min  - Continue hydralazine 10 mg TID, hold for SBP <90, will consider increasing today.  - Continue spironolactone 50 mg BID  - Continue Toprol XL 12.5 mg daily.  - Daily BMP and Mg  - Goal K 4-4.5, Mg 2-2.5  - Strict I/Os and daily standing weights  - Fluid restriction of 1L/day 119

## 2019-01-11 NOTE — PROGRESS NOTE ADULT - ASSESSMENT
63F w/ PMHx of Hfref (mod-severe MR, EF 10% 11/2018) s/p ICD  s/p  PICC  on milrinone ,h/o  thyroid CA s/p resection c/b hypoparathyroidism, HTN, DMII, p/w worsening fluid retention      Problem/Plan - 1:  ·  Problem: Acute on Chronic Systolic CHF exacerbation.  Plan: Clinically improving.   -on Milrinone infusion and  Bumex PO now  .  - Spironolactone, hydralazine and Metolazone   - Heart failure and Cardiology helping.   - strict and outs   - daily weight    Problem/Plan - 2:  ·  Problem: DM (diabetes mellitus).  Plan: -Sugars high so increased Lantus .   -Holding  oral hypoglycemics  -Start HISS, check fsg qac/qhs  -consistent carb diet.      Problem/Plan - 3:  ·  Problem: HTN (hypertension).  Plan: Well controlled.      Problem/Plan - 4:  ·  Problem: Hypothyroidism   Plan: - continue levothyroxine   - calcitriol.      Problem/Plan - 5:  ·  Problem: Hypocalcemia & Hyponatremia .  Plan: - Renal helping.   -calcium acetate   - calcium + D.      Problem/Plan - 6:  Problem: Anemia. Plan: HH stable. Work up in progress.    no active bleeding   - monitor CBC.     Problem/Plan - 7:  ·  Problem: Elevated liver function tests.  Plan:  2/2 congestive hepatopathy . LFT trending down.   - continue rifaximin.      Problem/Plan - 8:  ·  Problem: Diabetic neuropathy with Foot Pain .  Plan: - Continue Gabapentin and added extra dose at Bed time. . Podiatry consult noted.      Problem/Plan - 9:  ·  Problem: Moderate to Severe MR.  Plan: - May need Rpt TTE per cardiology.      Problem/Plan - 10:  ·  Problem: Need for prophylactic measure.  Plan: - sub q heparin.    Disposition : DC planning home .

## 2019-01-11 NOTE — PROGRESS NOTE ADULT - SUBJECTIVE AND OBJECTIVE BOX
NEPHROLOGY-[facility] [phone]        Patient seen and examined in bed.  She was breathing  well.  Off Bumex gtt at present        MEDICATIONS  (STANDING):  acetaminophen  IVPB .. 1000 milliGRAM(s) IV Intermittent once  aspirin enteric coated 81 milliGRAM(s) Oral daily  buMETAnide 4 milliGRAM(s) Oral two times a day  calcitriol   Capsule 0.5 MICROGram(s) Oral daily  calcium acetate 667 milliGRAM(s) Oral three times a day with meals  calcium carbonate 1250 mG  + Vitamin D (OsCal 500 + D) 1 Tablet(s) Oral two times a day  chlorhexidine 4% Liquid 1 Application(s) Topical <User Schedule>  dextrose 5%. 1000 milliLiter(s) (50 mL/Hr) IV Continuous <Continuous>  dextrose 50% Injectable 25 Gram(s) IV Push once  gabapentin 200 milliGRAM(s) Oral three times a day  gabapentin 100 milliGRAM(s) Oral at bedtime  hydrALAZINE 10 milliGRAM(s) Oral every 8 hours  insulin glargine Injectable (LANTUS) 10 Unit(s) SubCutaneous at bedtime  insulin lispro (HumaLOG) corrective regimen sliding scale   SubCutaneous three times a day before meals  insulin lispro (HumaLOG) corrective regimen sliding scale   SubCutaneous at bedtime  insulin lispro Injectable (HumaLOG) 3 Unit(s) SubCutaneous before breakfast  insulin lispro Injectable (HumaLOG) 3 Unit(s) SubCutaneous before lunch  insulin lispro Injectable (HumaLOG) 3 Unit(s) SubCutaneous before dinner  levothyroxine 175 MICROGram(s) Oral daily  magnesium oxide 400 milliGRAM(s) Oral two times a day with meals  metolazone 2.5 milliGRAM(s) Oral <User Schedule>  metoprolol succinate ER 12.5 milliGRAM(s) Oral daily  milrinone Infusion 0.5 MICROgram(s)/kG/Min (14.76 mL/Hr) IV Continuous <Continuous>  pantoprazole    Tablet 40 milliGRAM(s) Oral before breakfast  rifaximin 550 milliGRAM(s) Oral two times a day  spironolactone 50 milliGRAM(s) Oral two times a day      VITAL:  T(C): , Max: 36.8 (01-10-19 @ 13:49)  T(F): , Max: 98.3 (01-10-19 @ 13:49)  HR: 114 (01-11-19 @ 04:40)  BP: 113/70 (01-11-19 @ 04:40)  BP(mean): --  RR: 18 (01-11-19 @ 04:40)  SpO2: 98% (01-11-19 @ 04:40)  Wt(kg): --    I and O's:    01-10 @ 07:01 - 01-11 @ 07:00  --------------------------------------------------------  IN: 1237.6 mL / OUT: 3650 mL / NET: -2412.4 mL    01-11 @ 07:01 - 01-11 @ 10:07  --------------------------------------------------------  IN: 300 mL / OUT: 0 mL / NET: 300 mL          PHYSICAL EXAM:    Constitutional: NAD  Neck:  No JVD  Respiratory: CTAB/L  Cardiovascular: S1 and S2  Gastrointestinal: BS+, soft, NT/ND  Extremities: trace peripheral edema  Neurological: A/O x 3, no focal deficits  Psychiatric: Normal mood, normal affect  : No Gustafson  Skin: No rashes  Access: Not applicable    LABS:                        12.4   7.24  )-----------( 393      ( 10 Gianluca 2019 07:22 )             38.2     01-11    127<L>  |  78<L>  |  64<H>  ----------------------------<  168<H>  3.5   |  29  |  1.30    Ca    9.6      11 Jan 2019 06:23  Mg     1.9     01-10            Urine Studies:          RADIOLOGY & ADDITIONAL STUDIES:

## 2019-01-11 NOTE — PROGRESS NOTE ADULT - ATTENDING COMMENTS
euvolemic on exam, she has previously been on bumex 3mg bid at home with reported compliance  she is chronically hyponatremic, however she is currently at her dry weight    - bumex 4mg bid  - metolazone 2.5mg po mondays and thursdays  - metoprolol succinate as above  - spironolactone as above  - metolazone as above  - follow up with us as outpatient euvolemic on exam, she has previously been on bumex 3mg bid at home with reported compliance; inotrope dependent  she is chronically hyponatremic, however she is currently at her dry weight    - bumex 4mg bid  - metolazone 2.5mg po mondays and thursdays  - rest of meds as above  - follow up as outpatient

## 2019-01-11 NOTE — PROGRESS NOTE ADULT - SUBJECTIVE AND OBJECTIVE BOX
EP ATTENDING    no palpitations, no syncope, no dyspnea    acetaminophen   Tablet .. 650 milliGRAM(s) Oral every 6 hours PRN  acetaminophen  IVPB .. 1000 milliGRAM(s) IV Intermittent once  aspirin enteric coated 81 milliGRAM(s) Oral daily  buMETAnide 4 milliGRAM(s) Oral two times a day  calcitriol   Capsule 0.25 MICROGram(s) Oral daily  calcium acetate 667 milliGRAM(s) Oral three times a day with meals  calcium carbonate 1250 mG  + Vitamin D (OsCal 500 + D) 1 Tablet(s) Oral two times a day  chlorhexidine 4% Liquid 1 Application(s) Topical <User Schedule>  dextrose 40% Gel 15 Gram(s) Oral once PRN  dextrose 5%. 1000 milliLiter(s) IV Continuous <Continuous>  dextrose 50% Injectable 25 Gram(s) IV Push once  gabapentin 200 milliGRAM(s) Oral three times a day  gabapentin 100 milliGRAM(s) Oral at bedtime  glucagon  Injectable 1 milliGRAM(s) IntraMuscular once PRN  guaiFENesin   Syrup  (Sugar-Free) 200 milliGRAM(s) Oral every 6 hours PRN  hydrALAZINE 10 milliGRAM(s) Oral every 8 hours  insulin glargine Injectable (LANTUS) 10 Unit(s) SubCutaneous at bedtime  insulin lispro (HumaLOG) corrective regimen sliding scale   SubCutaneous three times a day before meals  insulin lispro (HumaLOG) corrective regimen sliding scale   SubCutaneous at bedtime  insulin lispro Injectable (HumaLOG) 3 Unit(s) SubCutaneous before breakfast  insulin lispro Injectable (HumaLOG) 3 Unit(s) SubCutaneous before lunch  insulin lispro Injectable (HumaLOG) 3 Unit(s) SubCutaneous before dinner  levothyroxine 175 MICROGram(s) Oral daily  magnesium oxide 400 milliGRAM(s) Oral two times a day with meals  metolazone 2.5 milliGRAM(s) Oral <User Schedule>  metoprolol succinate ER 12.5 milliGRAM(s) Oral daily  milrinone Infusion 0.5 MICROgram(s)/kG/Min IV Continuous <Continuous>  pantoprazole    Tablet 40 milliGRAM(s) Oral before breakfast  potassium chloride    Tablet ER 20 milliEquivalent(s) Oral daily  rifaximin 550 milliGRAM(s) Oral two times a day  spironolactone 50 milliGRAM(s) Oral two times a day                            12.4   7.24  )-----------( 393      ( 10 Gianluca 2019 07:22 )             38.2       01-11    127<L>  |  78<L>  |  64<H>  ----------------------------<  168<H>  3.5   |  29  |  1.30    Ca    9.6      11 Jan 2019 06:23  Mg     1.9     01-10        T(C): 36.7 (01-11-19 @ 04:40), Max: 36.8 (01-10-19 @ 13:49)  HR: 114 (01-11-19 @ 04:40) (107 - 114)  BP: 113/70 (01-11-19 @ 04:40) (107/76 - 116/81)  RR: 18 (01-11-19 @ 04:40) (17 - 18)  SpO2: 98% (01-11-19 @ 04:40) (98% - 98%)  Wt(kg): --    JVP 6  RRR, no murmurs  CTAB  soft nt/nd  no c/c/e     A/P) She is a pleasant 62 y/o female PMH severe NICM (LVEF 10-15%) who had a [employer] BiV-ICD implanted at [place]. A LHC at [place] was also unremarkable. She now returns for with a severe decompensation of her NICM (JVP > 12, LE edema and a near 14 lb weight gain). Her device interrogation recently demonstrates excellent RA, RV and LV lead functions, but with frequent episodes of NSVT. She denies palpitations nor high voltage therapy. EP is now called because she is persistently tachycardic. Review of her EKGs and tele show the rhythm is sinus with adequate BiV-tracking (RBBB in V1, negative in I/L). She hasn't had any more significant NSVT despite milrinone (only 6 beats)    -continue toprol, milrinone and bumex  -repeat TTE now that she's euvolemic to assess severity of TR (this can be done as outpatient if needed)  -a future option includes turning off BiV pacing to see if she responds better  -f/u with [facility] after discharge on Tue March 12th at 9:45am  -d/c planning as per primary team

## 2019-01-11 NOTE — DISCHARGE NOTE ADULT - CARE PROVIDERS DIRECT ADDRESSES
,DirectAddress_Unknown ,DirectAddress_Unknown,[email or web address],DirectAddress_Unknown

## 2019-01-11 NOTE — PROGRESS NOTE ADULT - SUBJECTIVE AND OBJECTIVE BOX
INTERVAL HPI/OVERNIGHT EVENTS: Sleepy otherwise fine.   Vital Signs Last 24 Hrs  T(C): 36.7 (11 Jan 2019 04:40), Max: 36.8 (10 Gianluca 2019 13:49)  T(F): 98 (11 Jan 2019 04:40), Max: 98.3 (10 Gianluca 2019 13:49)  HR: 114 (11 Jan 2019 04:40) (107 - 114)  BP: 113/70 (11 Jan 2019 04:40) (107/76 - 116/81)  BP(mean): --  RR: 18 (11 Jan 2019 04:40) (17 - 18)  SpO2: 98% (11 Jan 2019 04:40) (98% - 98%)  I&O's Summary    10 Gianluca 2019 07:01  -  11 Jan 2019 07:00  --------------------------------------------------------  IN: 1237.6 mL / OUT: 3650 mL / NET: -2412.4 mL    11 Jan 2019 07:01  -  11 Jan 2019 12:08  --------------------------------------------------------  IN: 300 mL / OUT: 0 mL / NET: 300 mL      MEDICATIONS  (STANDING):  acetaminophen  IVPB .. 1000 milliGRAM(s) IV Intermittent once  aspirin enteric coated 81 milliGRAM(s) Oral daily  buMETAnide 4 milliGRAM(s) Oral two times a day  calcitriol   Capsule 0.25 MICROGram(s) Oral daily  calcium acetate 667 milliGRAM(s) Oral three times a day with meals  calcium carbonate 1250 mG  + Vitamin D (OsCal 500 + D) 1 Tablet(s) Oral two times a day  chlorhexidine 4% Liquid 1 Application(s) Topical <User Schedule>  dextrose 5%. 1000 milliLiter(s) (50 mL/Hr) IV Continuous <Continuous>  dextrose 50% Injectable 25 Gram(s) IV Push once  gabapentin 200 milliGRAM(s) Oral three times a day  gabapentin 100 milliGRAM(s) Oral at bedtime  hydrALAZINE 10 milliGRAM(s) Oral every 8 hours  insulin glargine Injectable (LANTUS) 10 Unit(s) SubCutaneous at bedtime  insulin lispro (HumaLOG) corrective regimen sliding scale   SubCutaneous three times a day before meals  insulin lispro (HumaLOG) corrective regimen sliding scale   SubCutaneous at bedtime  insulin lispro Injectable (HumaLOG) 3 Unit(s) SubCutaneous before breakfast  insulin lispro Injectable (HumaLOG) 3 Unit(s) SubCutaneous before lunch  insulin lispro Injectable (HumaLOG) 3 Unit(s) SubCutaneous before dinner  levothyroxine 175 MICROGram(s) Oral daily  magnesium oxide 400 milliGRAM(s) Oral two times a day with meals  metolazone 2.5 milliGRAM(s) Oral <User Schedule>  metoprolol succinate ER 12.5 milliGRAM(s) Oral daily  milrinone Infusion 0.5 MICROgram(s)/kG/Min (14.76 mL/Hr) IV Continuous <Continuous>  pantoprazole    Tablet 40 milliGRAM(s) Oral before breakfast  potassium chloride    Tablet ER 40 milliEquivalent(s) Oral once  potassium chloride    Tablet ER 20 milliEquivalent(s) Oral daily  rifaximin 550 milliGRAM(s) Oral two times a day  spironolactone 50 milliGRAM(s) Oral two times a day    MEDICATIONS  (PRN):  acetaminophen   Tablet .. 650 milliGRAM(s) Oral every 6 hours PRN Mild Pain (1 - 3)  dextrose 40% Gel 15 Gram(s) Oral once PRN Blood Glucose LESS THAN 70 milliGRAM(s)/deciliter  glucagon  Injectable 1 milliGRAM(s) IntraMuscular once PRN Glucose LESS THAN 70 milligrams/deciliter  guaiFENesin   Syrup  (Sugar-Free) 200 milliGRAM(s) Oral every 6 hours PRN Cough    LABS:                        12.4   7.24  )-----------( 393      ( 10 Gianluca 2019 07:22 )             38.2     01-11    127<L>  |  78<L>  |  64<H>  ----------------------------<  168<H>  3.5   |  29  |  1.30    Ca    9.6      11 Jan 2019 06:23  Mg     1.9     01-10          CAPILLARY BLOOD GLUCOSE      POCT Blood Glucose.: 272 mg/dL (11 Jan 2019 11:39)  POCT Blood Glucose.: 215 mg/dL (11 Jan 2019 08:13)  POCT Blood Glucose.: 192 mg/dL (11 Jan 2019 07:08)  POCT Blood Glucose.: 211 mg/dL (10 Gianluca 2019 21:24)  POCT Blood Glucose.: 293 mg/dL (10 Gianluca 2019 17:14)          REVIEW OF SYSTEMS:  CONSTITUTIONAL: No fever, weight loss, or fatigue  EYES: No eye pain, visual disturbances, or discharge  ENMT:  No difficulty hearing, tinnitus, vertigo; No sinus or throat pain  NECK: No pain or stiffness  RESPIRATORY: No cough, wheezing, chills or hemoptysis; No shortness of breath  CARDIOVASCULAR: No chest pain, palpitations, dizziness, or leg swelling  GASTROINTESTINAL: No abdominal or epigastric pain. No nausea, vomiting, or hematemesis; No diarrhea or constipation. No melena or hematochezia.  GENITOURINARY: No dysuria, frequency, hematuria, or incontinence  NEUROLOGICAL: No headaches, memory loss, loss of strength, numbness, or tremors    Consultant(s) Notes Reviewed:  [x ] YES  [ ] NO    PHYSICAL EXAM:  GENERAL: NAD, well-groomed, well-developed, not in any distress ,  HEAD:  Atraumatic, Normocephalic  EYES: EOMI, PERRLA, conjunctiva and sclera clear  ENMT: No tonsillar erythema, exudates, or enlargement; Moist mucous membranes, Good dentition, No lesions  NECK: JVD++  NERVOUS SYSTEM:  Alert & Oriented X3, No focal deficit   CHEST/LUNG: Good air entry bilateral with no  rales, rhonchi, wheezing, or rubs  HEART: Regular rate and rhythm; No murmurs, rubs, or gallops  ABDOMEN: Soft, Nontender, Nondistended; Bowel sounds present  EXTREMITIES:  2+ Peripheral Pulses, No clubbing, cyanosis, or edema  SKIN: No rashes or lesions    Care Discussed with Consultants/Other Providers [ x] YES  [ ] NO none

## 2019-01-11 NOTE — PROGRESS NOTE ADULT - ASSESSMENT
ASSESSMENT/RECOMMEND  A 63-year-old female with past medical history of hypertension, diabetes, chronic kidney disease stage III, presents with grossly volume overloaded state.  She has hypervolemic, hyponatremia and sp  aggressive diuresis.  Hypoparathyroidism  Hypokalemia and hyponatremia    Cardiology.  On Bumex PO now with good urine output; on primacor gtt.  We are approaching dry wt.  Renal.  Serum sodium stable; no need for Samsca at present.  Avoid excessive amounts of liquids without calories.  Encourage protein intake. On KCl 40 meq PO daily but additional Kdur 40 x 1   Endo.  Continue with Synthroid.  Once she is out of heart failure, the dose of Synthroid may need to be adjusted.     Calcium is improving as well.  Reduce calcium bid yesterday and reduce calcitrol to 0.25mcg po qd.  I suspect gut edema was also preventing absorption      [facility]  [phone]

## 2019-01-12 LAB
ANION GAP SERPL CALC-SCNC: 20 MMOL/L — HIGH (ref 5–17)
BUN SERPL-MCNC: 74 MG/DL — HIGH (ref 7–23)
CALCIUM SERPL-MCNC: 9.5 MG/DL — SIGNIFICANT CHANGE UP (ref 8.4–10.5)
CHLORIDE SERPL-SCNC: 76 MMOL/L — LOW (ref 96–108)
CO2 SERPL-SCNC: 29 MMOL/L — SIGNIFICANT CHANGE UP (ref 22–31)
CREAT SERPL-MCNC: 1.2 MG/DL — SIGNIFICANT CHANGE UP (ref 0.5–1.3)
GLUCOSE BLDC GLUCOMTR-MCNC: 154 MG/DL — HIGH (ref 70–99)
GLUCOSE BLDC GLUCOMTR-MCNC: 188 MG/DL — HIGH (ref 70–99)
GLUCOSE BLDC GLUCOMTR-MCNC: 316 MG/DL — HIGH (ref 70–99)
GLUCOSE BLDC GLUCOMTR-MCNC: 364 MG/DL — HIGH (ref 70–99)
GLUCOSE SERPL-MCNC: 117 MG/DL — HIGH (ref 70–99)
HCT VFR BLD CALC: 41.3 % — SIGNIFICANT CHANGE UP (ref 34.5–45)
HGB BLD-MCNC: 13.1 G/DL — SIGNIFICANT CHANGE UP (ref 11.5–15.5)
MAGNESIUM SERPL-MCNC: 2.2 MG/DL — SIGNIFICANT CHANGE UP (ref 1.6–2.6)
MCHC RBC-ENTMCNC: 22.6 PG — LOW (ref 27–34)
MCHC RBC-ENTMCNC: 31.7 GM/DL — LOW (ref 32–36)
MCV RBC AUTO: 71.2 FL — LOW (ref 80–100)
PLATELET # BLD AUTO: 343 K/UL — SIGNIFICANT CHANGE UP (ref 150–400)
POTASSIUM SERPL-MCNC: 3.3 MMOL/L — LOW (ref 3.5–5.3)
POTASSIUM SERPL-SCNC: 3.3 MMOL/L — LOW (ref 3.5–5.3)
RBC # BLD: 5.79 M/UL — HIGH (ref 3.8–5.2)
RBC # FLD: 22.1 % — HIGH (ref 10.3–14.5)
SODIUM SERPL-SCNC: 125 MMOL/L — LOW (ref 135–145)
WBC # BLD: 8.1 K/UL — SIGNIFICANT CHANGE UP (ref 3.8–10.5)
WBC # FLD AUTO: 8.1 K/UL — SIGNIFICANT CHANGE UP (ref 3.8–10.5)

## 2019-01-12 RX ADMIN — GABAPENTIN 100 MILLIGRAM(S): 400 CAPSULE ORAL at 22:42

## 2019-01-12 RX ADMIN — SPIRONOLACTONE 50 MILLIGRAM(S): 25 TABLET, FILM COATED ORAL at 06:08

## 2019-01-12 RX ADMIN — Medication 175 MICROGRAM(S): at 06:08

## 2019-01-12 RX ADMIN — Medication 3: at 22:41

## 2019-01-12 RX ADMIN — Medication 667 MILLIGRAM(S): at 12:45

## 2019-01-12 RX ADMIN — MAGNESIUM OXIDE 400 MG ORAL TABLET 400 MILLIGRAM(S): 241.3 TABLET ORAL at 08:20

## 2019-01-12 RX ADMIN — CHLORHEXIDINE GLUCONATE 1 APPLICATION(S): 213 SOLUTION TOPICAL at 08:20

## 2019-01-12 RX ADMIN — MAGNESIUM OXIDE 400 MG ORAL TABLET 400 MILLIGRAM(S): 241.3 TABLET ORAL at 17:48

## 2019-01-12 RX ADMIN — PANTOPRAZOLE SODIUM 40 MILLIGRAM(S): 20 TABLET, DELAYED RELEASE ORAL at 06:08

## 2019-01-12 RX ADMIN — Medication 1 TABLET(S): at 17:48

## 2019-01-12 RX ADMIN — CALCITRIOL 0.25 MICROGRAM(S): 0.5 CAPSULE ORAL at 12:45

## 2019-01-12 RX ADMIN — GABAPENTIN 200 MILLIGRAM(S): 400 CAPSULE ORAL at 06:08

## 2019-01-12 RX ADMIN — Medication 81 MILLIGRAM(S): at 12:45

## 2019-01-12 RX ADMIN — GABAPENTIN 200 MILLIGRAM(S): 400 CAPSULE ORAL at 14:33

## 2019-01-12 RX ADMIN — MILRINONE LACTATE 11.41 MICROGRAM(S)/KG/MIN: 1 INJECTION, SOLUTION INTRAVENOUS at 08:20

## 2019-01-12 RX ADMIN — Medication 10 MILLIGRAM(S): at 22:42

## 2019-01-12 RX ADMIN — Medication 3 UNIT(S): at 12:44

## 2019-01-12 RX ADMIN — Medication 20 MILLIEQUIVALENT(S): at 12:45

## 2019-01-12 RX ADMIN — Medication 3 UNIT(S): at 08:19

## 2019-01-12 RX ADMIN — Medication 1: at 17:48

## 2019-01-12 RX ADMIN — INSULIN GLARGINE 10 UNIT(S): 100 INJECTION, SOLUTION SUBCUTANEOUS at 22:41

## 2019-01-12 RX ADMIN — Medication 3 UNIT(S): at 17:48

## 2019-01-12 RX ADMIN — Medication 1: at 08:19

## 2019-01-12 RX ADMIN — BUMETANIDE 4 MILLIGRAM(S): 0.25 INJECTION INTRAMUSCULAR; INTRAVENOUS at 17:56

## 2019-01-12 RX ADMIN — Medication 667 MILLIGRAM(S): at 17:48

## 2019-01-12 RX ADMIN — MILRINONE LACTATE 11.41 MICROGRAM(S)/KG/MIN: 1 INJECTION, SOLUTION INTRAVENOUS at 14:33

## 2019-01-12 RX ADMIN — Medication 1 TABLET(S): at 06:08

## 2019-01-12 RX ADMIN — Medication 12.5 MILLIGRAM(S): at 06:07

## 2019-01-12 RX ADMIN — GABAPENTIN 200 MILLIGRAM(S): 400 CAPSULE ORAL at 22:42

## 2019-01-12 RX ADMIN — Medication 10 MILLIGRAM(S): at 14:33

## 2019-01-12 RX ADMIN — Medication 667 MILLIGRAM(S): at 08:20

## 2019-01-12 RX ADMIN — SPIRONOLACTONE 50 MILLIGRAM(S): 25 TABLET, FILM COATED ORAL at 17:48

## 2019-01-12 RX ADMIN — Medication 4: at 12:44

## 2019-01-12 RX ADMIN — BUMETANIDE 4 MILLIGRAM(S): 0.25 INJECTION INTRAMUSCULAR; INTRAVENOUS at 06:09

## 2019-01-12 RX ADMIN — Medication 10 MILLIGRAM(S): at 06:08

## 2019-01-12 NOTE — PROGRESS NOTE ADULT - SUBJECTIVE AND OBJECTIVE BOX
EP ATTENDING    no palpitations, no syncope, no dyspnea    acetaminophen   Tablet .. 650 milliGRAM(s) Oral every 6 hours PRN  acetaminophen  IVPB .. 1000 milliGRAM(s) IV Intermittent once  aspirin enteric coated 81 milliGRAM(s) Oral daily  buMETAnide 4 milliGRAM(s) Oral two times a day  calcitriol   Capsule 0.25 MICROGram(s) Oral daily  calcium acetate 667 milliGRAM(s) Oral three times a day with meals  calcium carbonate 1250 mG  + Vitamin D (OsCal 500 + D) 1 Tablet(s) Oral two times a day  chlorhexidine 4% Liquid 1 Application(s) Topical <User Schedule>  dextrose 40% Gel 15 Gram(s) Oral once PRN  dextrose 5%. 1000 milliLiter(s) IV Continuous <Continuous>  dextrose 50% Injectable 25 Gram(s) IV Push once  gabapentin 200 milliGRAM(s) Oral three times a day  gabapentin 100 milliGRAM(s) Oral at bedtime  glucagon  Injectable 1 milliGRAM(s) IntraMuscular once PRN  guaiFENesin   Syrup  (Sugar-Free) 200 milliGRAM(s) Oral every 6 hours PRN  hydrALAZINE 10 milliGRAM(s) Oral every 8 hours  insulin glargine Injectable (LANTUS) 10 Unit(s) SubCutaneous at bedtime  insulin lispro (HumaLOG) corrective regimen sliding scale   SubCutaneous three times a day before meals  insulin lispro (HumaLOG) corrective regimen sliding scale   SubCutaneous at bedtime  insulin lispro Injectable (HumaLOG) 3 Unit(s) SubCutaneous before breakfast  insulin lispro Injectable (HumaLOG) 3 Unit(s) SubCutaneous before lunch  insulin lispro Injectable (HumaLOG) 3 Unit(s) SubCutaneous before dinner  levothyroxine 175 MICROGram(s) Oral daily  magnesium oxide 400 milliGRAM(s) Oral two times a day with meals  metolazone 2.5 milliGRAM(s) Oral <User Schedule>  metoprolol succinate ER 12.5 milliGRAM(s) Oral daily  milrinone Infusion 0.5 MICROgram(s)/kG/Min IV Continuous <Continuous>  pantoprazole    Tablet 40 milliGRAM(s) Oral before breakfast  potassium chloride    Tablet ER 20 milliEquivalent(s) Oral daily  rifaximin 550 milliGRAM(s) Oral two times a day  spironolactone 50 milliGRAM(s) Oral two times a day                            13.1   8.1   )-----------( 343      ( 12 Jan 2019 06:41 )             41.3       01-12    125<L>  |  76<L>  |  74<H>  ----------------------------<  117<H>  3.3<L>   |  29  |  1.20    Ca    9.5      12 Jan 2019 06:41  Mg     2.2     01-12        T(C): 36.8 (01-12-19 @ 04:55), Max: 36.8 (01-12-19 @ 04:55)  HR: 92 (01-12-19 @ 04:55) (92 - 109)  BP: 105/71 (01-12-19 @ 04:55) (101/69 - 118/71)  RR: 18 (01-12-19 @ 04:55) (15 - 18)  SpO2: 96% (01-12-19 @ 04:55) (96% - 97%)  Wt(kg): --    JVP 6  RRR, no murmurs  CTAB  soft nt/nd  no c/c/e     A/P) She is a pleasant 62 y/o female PMH severe NICM (LVEF 10-15%) who had a [employer] BiV-ICD implanted at [place]. A LHC at [place] was also unremarkable. She now returns for with a severe decompensation of her NICM (JVP > 12, LE edema and a near 14 lb weight gain). Her device interrogation recently demonstrates excellent RA, RV and LV lead functions, but with frequent episodes of NSVT. She denies palpitations nor high voltage therapy. EP is now called because she is persistently tachycardic. Review of her EKGs and tele show the rhythm is sinus with adequate BiV-tracking (RBBB in V1, negative in I/L). She hasn't had any more significant NSVT despite milrinone    -continue toprol, milrinone and bumex  -repeat TTE now that she's euvolemic to assess severity of TR (this can be done as outpatient if needed)  -a future option includes turning off BiV pacing to see if she responds better  -f/u with [facility] after discharge on Tue March 12th at 9:45am  -d/c planning as per primary team

## 2019-01-12 NOTE — PROGRESS NOTE ADULT - PROBLEM SELECTOR PLAN 1
- Continue Bumex 4 mg PO BID and metolazone 2.5 mg mondays and thursdays  - Continue current dose of milrinone at 0.5mcg/kg/min  - Continue hydralazine 10 mg TID, hold for SBP <90, will consider increasing today.  - Continue spironolactone 50 mg BID  - Continue Toprol XL 12.5 mg daily.  - Daily BMP and Mg  - Goal K 4-4.5, Mg 2-2.5  - Strict I/Os and daily standing weights  - Fluid restriction of 1L/day - Continue Bumex 4 mg PO BID and metolazone 2.5 mg mondays and thursdays  - Continue current dose of milrinone at 0.5mcg/kg/min  - Continue hydralazine 10 mg TID  - Continue spironolactone 50 mg BID  - Continue Toprol XL 12.5 mg daily.    No cardiac objections for discharge

## 2019-01-12 NOTE — PROGRESS NOTE ADULT - ASSESSMENT
63 year old  woman with NICM (EF 10%, LVIDD 6.1cm, dx 2013, ACC/AHA stage D, class IV HF, milrinone dependent) s/p CRT-D (6/2017), Mod-Severe MR, Severe TR, HTN, HLD, DM2, and Thyroid Cancer s/p resection c/b hypoparathyroidism with chronic hypocalcemia who presents for acute on chronic systolic heart failure.    Labs improved  Euvolemic on exam

## 2019-01-12 NOTE — PROGRESS NOTE ADULT - SUBJECTIVE AND OBJECTIVE BOX
- doing well this morning   - no dyspnea, or any other new symptoms     Medications:  acetaminophen   Tablet .. 650 milliGRAM(s) Oral every 6 hours PRN  acetaminophen  IVPB .. 1000 milliGRAM(s) IV Intermittent once  aspirin enteric coated 81 milliGRAM(s) Oral daily  buMETAnide 4 milliGRAM(s) Oral two times a day  calcitriol   Capsule 0.25 MICROGram(s) Oral daily  calcium acetate 667 milliGRAM(s) Oral three times a day with meals  calcium carbonate 1250 mG  + Vitamin D (OsCal 500 + D) 1 Tablet(s) Oral two times a day  chlorhexidine 4% Liquid 1 Application(s) Topical <User Schedule>  dextrose 40% Gel 15 Gram(s) Oral once PRN  dextrose 5%. 1000 milliLiter(s) IV Continuous <Continuous>  dextrose 50% Injectable 25 Gram(s) IV Push once  gabapentin 200 milliGRAM(s) Oral three times a day  gabapentin 100 milliGRAM(s) Oral at bedtime  glucagon  Injectable 1 milliGRAM(s) IntraMuscular once PRN  guaiFENesin   Syrup  (Sugar-Free) 200 milliGRAM(s) Oral every 6 hours PRN  hydrALAZINE 10 milliGRAM(s) Oral every 8 hours  insulin glargine Injectable (LANTUS) 10 Unit(s) SubCutaneous at bedtime  insulin lispro (HumaLOG) corrective regimen sliding scale   SubCutaneous three times a day before meals  insulin lispro (HumaLOG) corrective regimen sliding scale   SubCutaneous at bedtime  insulin lispro Injectable (HumaLOG) 3 Unit(s) SubCutaneous before breakfast  insulin lispro Injectable (HumaLOG) 3 Unit(s) SubCutaneous before lunch  insulin lispro Injectable (HumaLOG) 3 Unit(s) SubCutaneous before dinner  levothyroxine 175 MICROGram(s) Oral daily  magnesium oxide 400 milliGRAM(s) Oral two times a day with meals  metolazone 2.5 milliGRAM(s) Oral <User Schedule>  metoprolol succinate ER 12.5 milliGRAM(s) Oral daily  milrinone Infusion 0.5 MICROgram(s)/kG/Min IV Continuous <Continuous>  pantoprazole    Tablet 40 milliGRAM(s) Oral before breakfast  potassium chloride    Tablet ER 40 milliEquivalent(s) Oral once  potassium chloride    Tablet ER 20 milliEquivalent(s) Oral daily  rifaximin 550 milliGRAM(s) Oral two times a day  spironolactone 50 milliGRAM(s) Oral two times a day      Physical Exam:    Vitals:  Vital Signs Last 24 Hours  T(C): 36.7 (19 @ 04:40), Max: 36.8 (01-10-19 @ 13:49)  HR: 114 (19 @ 04:40) (107 - 114)  BP: 113/70 (19 @ 04:40) (107/76 - 116/81)  RR: 18 (19 @ 04:40) (17 - 18)  SpO2: 98% (19 @ 04:40) (98% - 98%)    Weight in k.1 ( @ 09:37)    I&O's Summary    10 Gianluca 2019 07:  -  2019 07:00  --------------------------------------------------------  IN: 1237.6 mL / OUT: 3650 mL / NET: -2412.4 mL    2019 07:  -  2019 10:48  --------------------------------------------------------  IN: 300 mL / OUT: 0 mL / NET: 300 mL    Tele: SR/ST 90-110s with occasional     General: No distress. Comfortable.  HEENT: EOM intact.  Neck: Neck supple. JVP 8 cm H2O. No masses  Chest: Clear to auscultation bilaterally  CV: RRR. Normal S1 and S2. II/VI SM. Radial pulses normal. trace BLE non-pitting edema to ankles; no change as compared to previous exam   Abdomen: Soft, non-distended, non-tender  Skin: No rashes or skin breakdown  Neurology: Alert and oriented times three. Sensation intact  Psych: Affect normal    Labs:                        12.4   7.24  )-----------( 393      ( 10 Gianluca 2019 07:22 )             38.2         127<L>  |  78<L>  |  64<H>  ----------------------------<  168<H>  3.5   |  29  |  1.30    Ca    9.6      2019 06:23  Mg     1.9     -10

## 2019-01-12 NOTE — PROGRESS NOTE ADULT - SUBJECTIVE AND OBJECTIVE BOX
Subjective:  no chest pain or sob     Tele : NSR      acetaminophen   Tablet .. 650 milliGRAM(s) Oral every 6 hours PRN  acetaminophen  IVPB .. 1000 milliGRAM(s) IV Intermittent once  aspirin enteric coated 81 milliGRAM(s) Oral daily  buMETAnide 4 milliGRAM(s) Oral two times a day  calcitriol   Capsule 0.25 MICROGram(s) Oral daily  calcium acetate 667 milliGRAM(s) Oral three times a day with meals  calcium carbonate 1250 mG  + Vitamin D (OsCal 500 + D) 1 Tablet(s) Oral two times a day  chlorhexidine 4% Liquid 1 Application(s) Topical <User Schedule>  dextrose 40% Gel 15 Gram(s) Oral once PRN  dextrose 5%. 1000 milliLiter(s) IV Continuous <Continuous>  dextrose 50% Injectable 25 Gram(s) IV Push once  gabapentin 200 milliGRAM(s) Oral three times a day  gabapentin 100 milliGRAM(s) Oral at bedtime  glucagon  Injectable 1 milliGRAM(s) IntraMuscular once PRN  guaiFENesin   Syrup  (Sugar-Free) 200 milliGRAM(s) Oral every 6 hours PRN  hydrALAZINE 10 milliGRAM(s) Oral every 8 hours  insulin glargine Injectable (LANTUS) 10 Unit(s) SubCutaneous at bedtime  insulin lispro (HumaLOG) corrective regimen sliding scale   SubCutaneous three times a day before meals  insulin lispro (HumaLOG) corrective regimen sliding scale   SubCutaneous at bedtime  insulin lispro Injectable (HumaLOG) 3 Unit(s) SubCutaneous before breakfast  insulin lispro Injectable (HumaLOG) 3 Unit(s) SubCutaneous before lunch  insulin lispro Injectable (HumaLOG) 3 Unit(s) SubCutaneous before dinner  levothyroxine 175 MICROGram(s) Oral daily  magnesium oxide 400 milliGRAM(s) Oral two times a day with meals  metolazone 2.5 milliGRAM(s) Oral <User Schedule>  metoprolol succinate ER 12.5 milliGRAM(s) Oral daily  milrinone Infusion 0.5 MICROgram(s)/kG/Min IV Continuous <Continuous>  pantoprazole    Tablet 40 milliGRAM(s) Oral before breakfast  potassium chloride    Tablet ER 20 milliEquivalent(s) Oral daily  rifaximin 550 milliGRAM(s) Oral two times a day  spironolactone 50 milliGRAM(s) Oral two times a day                            13.1   8.1   )-----------( 343      ( 12 Jan 2019 06:41 )             41.3       01-12    125<L>  |  76<L>  |  74<H>  ----------------------------<  117<H>  3.3<L>   |  29  |  1.20    Ca    9.5      12 Jan 2019 06:41  Mg     2.2     01-12              T(C): 36.8 (01-12-19 @ 04:55), Max: 36.8 (01-12-19 @ 04:55)  HR: 111 (01-12-19 @ 17:45) (92 - 111)  BP: 104/71 (01-12-19 @ 17:45) (104/71 - 118/71)  RR: 18 (01-12-19 @ 13:10) (18 - 18)  SpO2: 97% (01-12-19 @ 13:10) (96% - 97%)  Wt(kg): --    I&O's Summary    11 Jan 2019 07:01  -  12 Jan 2019 07:00  --------------------------------------------------------  IN: 1378.4 mL / OUT: 2750 mL / NET: -1371.6 mL    12 Jan 2019 07:01  -  12 Jan 2019 19:02  --------------------------------------------------------  IN: 760 mL / OUT: 700 mL / NET: 60 mL        HEENT:  (-)icterus (-)pallor  CV: N S1 S2 1/6 ROSALINDA (+)2 Pulses B/l  Resp:  Clear to ausculatation B/L, normal effort  GI: (+) BS Soft, NT, ND  Lymph:  (-)Edema, (-)obvious lymphadenopathy  Skin: Warm to touch, Normal turgor  Psych: Appropriate mood and affect        ECG:    A sense V paced	    RADIOLOGY:         CXR:  No infiltartes     ASSESSMENT/PLAN: 	63y Female  F with PMHx of NICM s/p AICD, HTN, moderate-severe MR, reportedly recent LHC at [place] was negative, thyroid cancer s/p thyroidectomy, DM,  admitted with decompensated acute on chronic systolic heart failure.    -no anginal symptoms currently  -continue with milrinone per heart failure  -appears more euvolemic  -no further interventional workup needed at this time  -primary care medicine    Kunal Muller MD

## 2019-01-12 NOTE — PROGRESS NOTE ADULT - SUBJECTIVE AND OBJECTIVE BOX
Subjective:  pt seen and examined, no complaints    Tele : NSR/ ST    acetaminophen   Tablet .. 650 milliGRAM(s) Oral every 6 hours PRN  acetaminophen  IVPB .. 1000 milliGRAM(s) IV Intermittent once  aspirin enteric coated 81 milliGRAM(s) Oral daily  buMETAnide 4 milliGRAM(s) Oral two times a day  calcitriol   Capsule 0.25 MICROGram(s) Oral daily  calcium acetate 667 milliGRAM(s) Oral three times a day with meals  calcium carbonate 1250 mG  + Vitamin D (OsCal 500 + D) 1 Tablet(s) Oral two times a day  chlorhexidine 4% Liquid 1 Application(s) Topical <User Schedule>  dextrose 40% Gel 15 Gram(s) Oral once PRN  dextrose 5%. 1000 milliLiter(s) IV Continuous <Continuous>  dextrose 50% Injectable 25 Gram(s) IV Push once  gabapentin 200 milliGRAM(s) Oral three times a day  gabapentin 100 milliGRAM(s) Oral at bedtime  glucagon  Injectable 1 milliGRAM(s) IntraMuscular once PRN  guaiFENesin   Syrup  (Sugar-Free) 200 milliGRAM(s) Oral every 6 hours PRN  hydrALAZINE 10 milliGRAM(s) Oral every 8 hours  insulin glargine Injectable (LANTUS) 10 Unit(s) SubCutaneous at bedtime  insulin lispro (HumaLOG) corrective regimen sliding scale   SubCutaneous three times a day before meals  insulin lispro (HumaLOG) corrective regimen sliding scale   SubCutaneous at bedtime  insulin lispro Injectable (HumaLOG) 3 Unit(s) SubCutaneous before breakfast  insulin lispro Injectable (HumaLOG) 3 Unit(s) SubCutaneous before lunch  insulin lispro Injectable (HumaLOG) 3 Unit(s) SubCutaneous before dinner  levothyroxine 175 MICROGram(s) Oral daily  magnesium oxide 400 milliGRAM(s) Oral two times a day with meals  metolazone 2.5 milliGRAM(s) Oral <User Schedule>  metoprolol succinate ER 12.5 milliGRAM(s) Oral daily  milrinone Infusion 0.5 MICROgram(s)/kG/Min IV Continuous <Continuous>  pantoprazole    Tablet 40 milliGRAM(s) Oral before breakfast  potassium chloride    Tablet ER 20 milliEquivalent(s) Oral daily  rifaximin 550 milliGRAM(s) Oral two times a day  spironolactone 50 milliGRAM(s) Oral two times a day                            12.4   7.24  )-----------( 393      ( 10 Gianluca 2019 07:22 )             38.2       Hemoglobin: 12.4 g/dL (01-10 @ 07:22)  Hemoglobin: 11.1 g/dL (01-09 @ 08:06)  Hemoglobin: 10.1 g/dL (01-08 @ 08:09)  Hemoglobin: 9.6 g/dL (01-07 @ 07:56)      01-11    127<L>  |  78<L>  |  64<H>  ----------------------------<  168<H>  3.5   |  29  |  1.30    Ca    9.6      11 Jan 2019 06:23      Creatinine Trend: 1.30<--, 1.08<--, 1.12<--, 1.24<--, 1.16<--, 1.08<--    COAGS:           T(C): 36.8 (01-12-19 @ 04:55), Max: 36.8 (01-12-19 @ 04:55)  HR: 92 (01-12-19 @ 04:55) (92 - 109)  BP: 105/71 (01-12-19 @ 04:55) (101/69 - 118/71)  RR: 18 (01-12-19 @ 04:55) (15 - 18)  SpO2: 96% (01-12-19 @ 04:55) (96% - 97%)  Wt(kg): --    I&O's Summary    11 Jan 2019 07:01  -  12 Jan 2019 07:00  --------------------------------------------------------  IN: 1378.4 mL / OUT: 2750 mL / NET: -1371.6 mL    	    HEENT:  (-)icterus (-)pallor  CV: N S1 S2 1/6 ROSALINDA (+)2 Pulses B/l  Resp:  Clear to ausculatation B/L, normal effort  GI: (+) BS Soft, NT, ND  Lymph:  (-)Edema, (-)obvious lymphadenopathy  Skin: Warm to touch, Normal turgor  Psych: Appropriate mood and affect        ECG:    A sense V paced	    RADIOLOGY:         CXR:  No infiltartes     ASSESSMENT/PLAN: 	63y Female  F with PMHx of NICM s/p AICD, HTN, moderate-severe MR, reportedly recent LHC at [place] was negative, thyroid cancer s/p thyroidectomy, DM,  admitted with decompensated acute on chronic systolic heart failure.    - ASA,  statin   - HR stable on tele   - cont diuresis on Bumex PO  / Primacor gtt   - heart failure follow up   - cont hydralizine , aldactone BP stable   - pt tolerating BB - HR stable   -  GI / DVT prophylaxis,  keep K>4, mag >2.0   D/W Dr Grimes

## 2019-01-12 NOTE — PROGRESS NOTE ADULT - SUBJECTIVE AND OBJECTIVE BOX
INTERVAL HPI/OVERNIGHT EVENTS:  Vital Signs Last 24 Hrs  T(C): 36.8 (12 Jan 2019 04:55), Max: 36.8 (12 Jan 2019 04:55)  T(F): 98.3 (12 Jan 2019 04:55), Max: 98.3 (12 Jan 2019 04:55)  HR: 106 (12 Jan 2019 13:10) (92 - 109)  BP: 107/71 (12 Jan 2019 13:10) (101/69 - 118/71)  BP(mean): --  RR: 18 (12 Jan 2019 13:10) (18 - 18)  SpO2: 97% (12 Jan 2019 13:10) (96% - 97%)  I&O's Summary    11 Jan 2019 07:01  -  12 Jan 2019 07:00  --------------------------------------------------------  IN: 1378.4 mL / OUT: 2750 mL / NET: -1371.6 mL    12 Jan 2019 07:01  -  12 Jan 2019 16:32  --------------------------------------------------------  IN: 760 mL / OUT: 700 mL / NET: 60 mL      MEDICATIONS  (STANDING):  acetaminophen  IVPB .. 1000 milliGRAM(s) IV Intermittent once  aspirin enteric coated 81 milliGRAM(s) Oral daily  buMETAnide 4 milliGRAM(s) Oral two times a day  calcitriol   Capsule 0.25 MICROGram(s) Oral daily  calcium acetate 667 milliGRAM(s) Oral three times a day with meals  calcium carbonate 1250 mG  + Vitamin D (OsCal 500 + D) 1 Tablet(s) Oral two times a day  chlorhexidine 4% Liquid 1 Application(s) Topical <User Schedule>  dextrose 5%. 1000 milliLiter(s) (50 mL/Hr) IV Continuous <Continuous>  dextrose 50% Injectable 25 Gram(s) IV Push once  gabapentin 200 milliGRAM(s) Oral three times a day  gabapentin 100 milliGRAM(s) Oral at bedtime  hydrALAZINE 10 milliGRAM(s) Oral every 8 hours  insulin glargine Injectable (LANTUS) 10 Unit(s) SubCutaneous at bedtime  insulin lispro (HumaLOG) corrective regimen sliding scale   SubCutaneous three times a day before meals  insulin lispro (HumaLOG) corrective regimen sliding scale   SubCutaneous at bedtime  insulin lispro Injectable (HumaLOG) 3 Unit(s) SubCutaneous before breakfast  insulin lispro Injectable (HumaLOG) 3 Unit(s) SubCutaneous before lunch  insulin lispro Injectable (HumaLOG) 3 Unit(s) SubCutaneous before dinner  levothyroxine 175 MICROGram(s) Oral daily  magnesium oxide 400 milliGRAM(s) Oral two times a day with meals  metolazone 2.5 milliGRAM(s) Oral <User Schedule>  metoprolol succinate ER 12.5 milliGRAM(s) Oral daily  milrinone Infusion 0.5 MICROgram(s)/kG/Min (11.415 mL/Hr) IV Continuous <Continuous>  pantoprazole    Tablet 40 milliGRAM(s) Oral before breakfast  potassium chloride    Tablet ER 20 milliEquivalent(s) Oral daily  rifaximin 550 milliGRAM(s) Oral two times a day  spironolactone 50 milliGRAM(s) Oral two times a day    MEDICATIONS  (PRN):  acetaminophen   Tablet .. 650 milliGRAM(s) Oral every 6 hours PRN Mild Pain (1 - 3)  dextrose 40% Gel 15 Gram(s) Oral once PRN Blood Glucose LESS THAN 70 milliGRAM(s)/deciliter  glucagon  Injectable 1 milliGRAM(s) IntraMuscular once PRN Glucose LESS THAN 70 milligrams/deciliter  guaiFENesin   Syrup  (Sugar-Free) 200 milliGRAM(s) Oral every 6 hours PRN Cough    LABS:                        13.1   8.1   )-----------( 343      ( 12 Jan 2019 06:41 )             41.3     01-12    125<L>  |  76<L>  |  74<H>  ----------------------------<  117<H>  3.3<L>   |  29  |  1.20    Ca    9.5      12 Jan 2019 06:41  Mg     2.2     01-12          CAPILLARY BLOOD GLUCOSE      POCT Blood Glucose.: 316 mg/dL (12 Jan 2019 11:59)  POCT Blood Glucose.: 154 mg/dL (12 Jan 2019 07:37)  POCT Blood Glucose.: 350 mg/dL (11 Jan 2019 21:11)  POCT Blood Glucose.: 226 mg/dL (11 Jan 2019 17:13)          REVIEW OF SYSTEMS:  CONSTITUTIONAL: No fever, weight loss, or fatigue  EYES: No eye pain, visual disturbances, or discharge  ENMT:  No difficulty hearing, tinnitus, vertigo; No sinus or throat pain  NECK: No pain or stiffness  BREASTS: No pain, masses, or nipple discharge  RESPIRATORY: No cough, wheezing, chills or hemoptysis; No shortness of breath  CARDIOVASCULAR: No chest pain, palpitations, dizziness, or leg swelling  GASTROINTESTINAL: No abdominal or epigastric pain. No nausea, vomiting, or hematemesis; No diarrhea or constipation. No melena or hematochezia.  GENITOURINARY: No dysuria, frequency, hematuria, or incontinence  NEUROLOGICAL: No headaches, memory loss, loss of strength, numbness, or tremors  SKIN: No itching, burning, rashes, or lesions   LYMPH NODES: No enlarged glands  ENDOCRINE: No heat or cold intolerance; No hair loss  MUSCULOSKELETAL: No joint pain or swelling; No muscle, back, or extremity pain  PSYCHIATRIC: No depression, anxiety, mood swings, or difficulty sleeping  HEME/LYMPH: No easy bruising, or bleeding gums  ALLERY AND IMMUNOLOGIC: No hives or eczema    RADIOLOGY & ADDITIONAL TESTS:    Consultant(s) Notes Reviewed:  [x ] YES  [ ] NO    PHYSICAL EXAM:  GENERAL: NAD, well-groomed, well-developed,not in any distress ,  HEAD:  Atraumatic, Normocephalic  EYES: EOMI, PERRLA, conjunctiva and sclera clear  ENMT: No tonsillar erythema, exudates, or enlargement; Moist mucous membranes, Good dentition, No lesions  NECK: Supple, No JVD, Normal thyroid  NERVOUS SYSTEM:  Alert & Oriented X3, No focal deficit   CHEST/LUNG: Good air entry bilateral with no  rales, rhonchi, wheezing, or rubs  HEART: Regular rate and rhythm; No murmurs, rubs, or gallops  ABDOMEN: Soft, Nontender, Nondistended; Bowel sounds present  EXTREMITIES:  2+ Peripheral Pulses, No clubbing, cyanosis, or edema  SKIN: No rashes or lesions    Care Discussed with Consultants/Other Providers [ x] YES  [ ] NO INTERVAL HPI/OVERNIGHT EVENTS: no new concerns.   Vital Signs Last 24 Hrs  T(C): 36.8 (12 Jan 2019 04:55), Max: 36.8 (12 Jan 2019 04:55)  T(F): 98.3 (12 Jan 2019 04:55), Max: 98.3 (12 Jan 2019 04:55)  HR: 106 (12 Jan 2019 13:10) (92 - 109)  BP: 107/71 (12 Jan 2019 13:10) (101/69 - 118/71)  BP(mean): --  RR: 18 (12 Jan 2019 13:10) (18 - 18)  SpO2: 97% (12 Jan 2019 13:10) (96% - 97%)  I&O's Summary    11 Jan 2019 07:01  -  12 Jan 2019 07:00  --------------------------------------------------------  IN: 1378.4 mL / OUT: 2750 mL / NET: -1371.6 mL    12 Jan 2019 07:01  -  12 Jan 2019 16:32  --------------------------------------------------------  IN: 760 mL / OUT: 700 mL / NET: 60 mL      MEDICATIONS  (STANDING):  acetaminophen  IVPB .. 1000 milliGRAM(s) IV Intermittent once  aspirin enteric coated 81 milliGRAM(s) Oral daily  buMETAnide 4 milliGRAM(s) Oral two times a day  calcitriol   Capsule 0.25 MICROGram(s) Oral daily  calcium acetate 667 milliGRAM(s) Oral three times a day with meals  calcium carbonate 1250 mG  + Vitamin D (OsCal 500 + D) 1 Tablet(s) Oral two times a day  chlorhexidine 4% Liquid 1 Application(s) Topical <User Schedule>  dextrose 5%. 1000 milliLiter(s) (50 mL/Hr) IV Continuous <Continuous>  dextrose 50% Injectable 25 Gram(s) IV Push once  gabapentin 200 milliGRAM(s) Oral three times a day  gabapentin 100 milliGRAM(s) Oral at bedtime  hydrALAZINE 10 milliGRAM(s) Oral every 8 hours  insulin glargine Injectable (LANTUS) 10 Unit(s) SubCutaneous at bedtime  insulin lispro (HumaLOG) corrective regimen sliding scale   SubCutaneous three times a day before meals  insulin lispro (HumaLOG) corrective regimen sliding scale   SubCutaneous at bedtime  insulin lispro Injectable (HumaLOG) 3 Unit(s) SubCutaneous before breakfast  insulin lispro Injectable (HumaLOG) 3 Unit(s) SubCutaneous before lunch  insulin lispro Injectable (HumaLOG) 3 Unit(s) SubCutaneous before dinner  levothyroxine 175 MICROGram(s) Oral daily  magnesium oxide 400 milliGRAM(s) Oral two times a day with meals  metolazone 2.5 milliGRAM(s) Oral <User Schedule>  metoprolol succinate ER 12.5 milliGRAM(s) Oral daily  milrinone Infusion 0.5 MICROgram(s)/kG/Min (11.415 mL/Hr) IV Continuous <Continuous>  pantoprazole    Tablet 40 milliGRAM(s) Oral before breakfast  potassium chloride    Tablet ER 20 milliEquivalent(s) Oral daily  rifaximin 550 milliGRAM(s) Oral two times a day  spironolactone 50 milliGRAM(s) Oral two times a day    MEDICATIONS  (PRN):  acetaminophen   Tablet .. 650 milliGRAM(s) Oral every 6 hours PRN Mild Pain (1 - 3)  dextrose 40% Gel 15 Gram(s) Oral once PRN Blood Glucose LESS THAN 70 milliGRAM(s)/deciliter  glucagon  Injectable 1 milliGRAM(s) IntraMuscular once PRN Glucose LESS THAN 70 milligrams/deciliter  guaiFENesin   Syrup  (Sugar-Free) 200 milliGRAM(s) Oral every 6 hours PRN Cough    LABS:                        13.1   8.1   )-----------( 343      ( 12 Jan 2019 06:41 )             41.3     01-12    125<L>  |  76<L>  |  74<H>  ----------------------------<  117<H>  3.3<L>   |  29  |  1.20    Ca    9.5      12 Jan 2019 06:41  Mg     2.2     01-12          CAPILLARY BLOOD GLUCOSE      POCT Blood Glucose.: 316 mg/dL (12 Jan 2019 11:59)  POCT Blood Glucose.: 154 mg/dL (12 Jan 2019 07:37)  POCT Blood Glucose.: 350 mg/dL (11 Jan 2019 21:11)  POCT Blood Glucose.: 226 mg/dL (11 Jan 2019 17:13)          REVIEW OF SYSTEMS:  CONSTITUTIONAL: No fever, weight loss, or fatigue  EYES: No eye pain, visual disturbances, or discharge  ENMT:  No difficulty hearing, tinnitus, vertigo; No sinus or throat pain  NECK: No pain or stiffness  RESPIRATORY: No cough, wheezing, chills or hemoptysis; No shortness of breath  CARDIOVASCULAR: No chest pain, palpitations, dizziness, or leg swelling  GASTROINTESTINAL: No abdominal or epigastric pain. No nausea, vomiting, or hematemesis; No diarrhea or constipation. No melena or hematochezia.  GENITOURINARY: No dysuria, frequency, hematuria, or incontinence  NEUROLOGICAL: No headaches, memory loss, loss of strength, numbness, or tremors      Consultant(s) Notes Reviewed:  [x ] YES  [ ] NO    PHYSICAL EXAM:  GENERAL: NAD, well-groomed, well-developed,not in any distress ,  HEAD:  Atraumatic, Normocephalic  EYES: EOMI, PERRLA, conjunctiva and sclera clear  NECK: Supple, No JVD, Normal thyroid  NERVOUS SYSTEM:  Alert & Oriented X3, No focal deficit   CHEST/LUNG: Good air entry bilateral with no  rales, rhonchi, wheezing, or rubs  HEART: Regular rate and rhythm; No murmurs, rubs, or gallops  ABDOMEN: Soft, Nontender, Nondistended; Bowel sounds present  EXTREMITIES:  2+ Peripheral Pulses, No clubbing, cyanosis, or edema    Care Discussed with Consultants/Other Providers [ x] YES  [ ] NO

## 2019-01-13 LAB
GLUCOSE BLDC GLUCOMTR-MCNC: 185 MG/DL — HIGH (ref 70–99)
GLUCOSE BLDC GLUCOMTR-MCNC: 235 MG/DL — HIGH (ref 70–99)
GLUCOSE BLDC GLUCOMTR-MCNC: 293 MG/DL — HIGH (ref 70–99)
GLUCOSE BLDC GLUCOMTR-MCNC: 308 MG/DL — HIGH (ref 70–99)
HCT VFR BLD CALC: 37 % — SIGNIFICANT CHANGE UP (ref 34.5–45)
HGB BLD-MCNC: 12.6 G/DL — SIGNIFICANT CHANGE UP (ref 11.5–15.5)
MCHC RBC-ENTMCNC: 22.4 PG — LOW (ref 27–34)
MCHC RBC-ENTMCNC: 34.1 GM/DL — SIGNIFICANT CHANGE UP (ref 32–36)
MCV RBC AUTO: 65.7 FL — LOW (ref 80–100)
PLATELET # BLD AUTO: 325 K/UL — SIGNIFICANT CHANGE UP (ref 150–400)
RBC # BLD: 5.63 M/UL — HIGH (ref 3.8–5.2)
RBC # FLD: 22.5 % — HIGH (ref 10.3–14.5)
WBC # BLD: 7.08 K/UL — SIGNIFICANT CHANGE UP (ref 3.8–10.5)
WBC # FLD AUTO: 7.08 K/UL — SIGNIFICANT CHANGE UP (ref 3.8–10.5)

## 2019-01-13 RX ORDER — INSULIN LISPRO 100/ML
6 VIAL (ML) SUBCUTANEOUS
Qty: 0 | Refills: 0 | Status: DISCONTINUED | OUTPATIENT
Start: 2019-01-13 | End: 2019-01-15

## 2019-01-13 RX ORDER — INSULIN GLARGINE 100 [IU]/ML
15 INJECTION, SOLUTION SUBCUTANEOUS AT BEDTIME
Qty: 0 | Refills: 0 | Status: DISCONTINUED | OUTPATIENT
Start: 2019-01-13 | End: 2019-01-15

## 2019-01-13 RX ORDER — IBUPROFEN 200 MG
400 TABLET ORAL ONCE
Qty: 0 | Refills: 0 | Status: DISCONTINUED | OUTPATIENT
Start: 2019-01-13 | End: 2019-01-13

## 2019-01-13 RX ORDER — POTASSIUM CHLORIDE 20 MEQ
40 PACKET (EA) ORAL ONCE
Qty: 0 | Refills: 0 | Status: DISCONTINUED | OUTPATIENT
Start: 2019-01-13 | End: 2019-01-13

## 2019-01-13 RX ADMIN — BUMETANIDE 4 MILLIGRAM(S): 0.25 INJECTION INTRAMUSCULAR; INTRAVENOUS at 06:06

## 2019-01-13 RX ADMIN — MAGNESIUM OXIDE 400 MG ORAL TABLET 400 MILLIGRAM(S): 241.3 TABLET ORAL at 18:04

## 2019-01-13 RX ADMIN — Medication 667 MILLIGRAM(S): at 08:26

## 2019-01-13 RX ADMIN — Medication 81 MILLIGRAM(S): at 12:30

## 2019-01-13 RX ADMIN — BUMETANIDE 4 MILLIGRAM(S): 0.25 INJECTION INTRAMUSCULAR; INTRAVENOUS at 18:05

## 2019-01-13 RX ADMIN — Medication 1: at 22:13

## 2019-01-13 RX ADMIN — CALCITRIOL 0.25 MICROGRAM(S): 0.5 CAPSULE ORAL at 12:30

## 2019-01-13 RX ADMIN — MAGNESIUM OXIDE 400 MG ORAL TABLET 400 MILLIGRAM(S): 241.3 TABLET ORAL at 08:26

## 2019-01-13 RX ADMIN — GABAPENTIN 200 MILLIGRAM(S): 400 CAPSULE ORAL at 22:12

## 2019-01-13 RX ADMIN — Medication 667 MILLIGRAM(S): at 18:05

## 2019-01-13 RX ADMIN — Medication 3 UNIT(S): at 08:26

## 2019-01-13 RX ADMIN — SPIRONOLACTONE 50 MILLIGRAM(S): 25 TABLET, FILM COATED ORAL at 18:04

## 2019-01-13 RX ADMIN — Medication 6 UNIT(S): at 12:30

## 2019-01-13 RX ADMIN — Medication 1 TABLET(S): at 06:07

## 2019-01-13 RX ADMIN — Medication 667 MILLIGRAM(S): at 12:30

## 2019-01-13 RX ADMIN — Medication 12.5 MILLIGRAM(S): at 06:07

## 2019-01-13 RX ADMIN — Medication 2: at 08:26

## 2019-01-13 RX ADMIN — Medication 175 MICROGRAM(S): at 06:07

## 2019-01-13 RX ADMIN — Medication 1 TABLET(S): at 18:04

## 2019-01-13 RX ADMIN — GABAPENTIN 200 MILLIGRAM(S): 400 CAPSULE ORAL at 06:07

## 2019-01-13 RX ADMIN — PANTOPRAZOLE SODIUM 40 MILLIGRAM(S): 20 TABLET, DELAYED RELEASE ORAL at 06:07

## 2019-01-13 RX ADMIN — GABAPENTIN 100 MILLIGRAM(S): 400 CAPSULE ORAL at 22:12

## 2019-01-13 RX ADMIN — Medication 10 MILLIGRAM(S): at 06:07

## 2019-01-13 RX ADMIN — MILRINONE LACTATE 11.41 MICROGRAM(S)/KG/MIN: 1 INJECTION, SOLUTION INTRAVENOUS at 08:27

## 2019-01-13 RX ADMIN — INSULIN GLARGINE 15 UNIT(S): 100 INJECTION, SOLUTION SUBCUTANEOUS at 22:13

## 2019-01-13 RX ADMIN — GABAPENTIN 200 MILLIGRAM(S): 400 CAPSULE ORAL at 15:41

## 2019-01-13 RX ADMIN — Medication 6 UNIT(S): at 18:04

## 2019-01-13 RX ADMIN — Medication 4: at 12:30

## 2019-01-13 RX ADMIN — Medication 20 MILLIEQUIVALENT(S): at 12:30

## 2019-01-13 RX ADMIN — Medication 10 MILLIGRAM(S): at 22:12

## 2019-01-13 RX ADMIN — Medication 10 MILLIGRAM(S): at 15:41

## 2019-01-13 RX ADMIN — SPIRONOLACTONE 50 MILLIGRAM(S): 25 TABLET, FILM COATED ORAL at 06:07

## 2019-01-13 RX ADMIN — Medication 1: at 18:03

## 2019-01-13 NOTE — PROGRESS NOTE ADULT - SUBJECTIVE AND OBJECTIVE BOX
Subjective:  pt seen and examined, no complaints    Tele : NSR/ ST  NO chest pain or sob on exam     acetaminophen   Tablet .. 650 milliGRAM(s) Oral every 6 hours PRN  acetaminophen  IVPB .. 1000 milliGRAM(s) IV Intermittent once  aspirin enteric coated 81 milliGRAM(s) Oral daily  buMETAnide 4 milliGRAM(s) Oral two times a day  calcitriol   Capsule 0.25 MICROGram(s) Oral daily  calcium acetate 667 milliGRAM(s) Oral three times a day with meals  calcium carbonate 1250 mG  + Vitamin D (OsCal 500 + D) 1 Tablet(s) Oral two times a day  chlorhexidine 4% Liquid 1 Application(s) Topical <User Schedule>  dextrose 40% Gel 15 Gram(s) Oral once PRN  dextrose 5%. 1000 milliLiter(s) IV Continuous <Continuous>  dextrose 50% Injectable 25 Gram(s) IV Push once  gabapentin 200 milliGRAM(s) Oral three times a day  gabapentin 100 milliGRAM(s) Oral at bedtime  glucagon  Injectable 1 milliGRAM(s) IntraMuscular once PRN  guaiFENesin   Syrup  (Sugar-Free) 200 milliGRAM(s) Oral every 6 hours PRN  hydrALAZINE 10 milliGRAM(s) Oral every 8 hours  insulin glargine Injectable (LANTUS) 10 Unit(s) SubCutaneous at bedtime  insulin lispro (HumaLOG) corrective regimen sliding scale   SubCutaneous three times a day before meals  insulin lispro (HumaLOG) corrective regimen sliding scale   SubCutaneous at bedtime  insulin lispro Injectable (HumaLOG) 3 Unit(s) SubCutaneous before breakfast  insulin lispro Injectable (HumaLOG) 3 Unit(s) SubCutaneous before lunch  insulin lispro Injectable (HumaLOG) 3 Unit(s) SubCutaneous before dinner  levothyroxine 175 MICROGram(s) Oral daily  magnesium oxide 400 milliGRAM(s) Oral two times a day with meals  metolazone 2.5 milliGRAM(s) Oral <User Schedule>  metoprolol succinate ER 12.5 milliGRAM(s) Oral daily  milrinone Infusion 0.5 MICROgram(s)/kG/Min IV Continuous <Continuous>  pantoprazole    Tablet 40 milliGRAM(s) Oral before breakfast  potassium chloride    Tablet ER 20 milliEquivalent(s) Oral daily  rifaximin 550 milliGRAM(s) Oral two times a day  spironolactone 50 milliGRAM(s) Oral two times a day                            13.1   8.1   )-----------( 343      ( 12 Jan 2019 06:41 )             41.3       Hemoglobin: 13.1 g/dL (01-12 @ 06:41)  Hemoglobin: 12.4 g/dL (01-10 @ 07:22)  Hemoglobin: 11.1 g/dL (01-09 @ 08:06)  Hemoglobin: 10.1 g/dL (01-08 @ 08:09)      01-12    125<L>  |  76<L>  |  74<H>  ----------------------------<  117<H>  3.3<L>   |  29  |  1.20    Ca    9.5      12 Jan 2019 06:41  Mg     2.2     01-12      Creatinine Trend: 1.20<--, 1.30<--, 1.08<--, 1.12<--, 1.24<--, 1.16<--    COAGS:           T(C): 36.6 (01-13-19 @ 04:45), Max: 36.6 (01-13-19 @ 04:45)  HR: 101 (01-13-19 @ 04:45) (101 - 111)  BP: 109/70 (01-13-19 @ 04:45) (104/71 - 112/77)  RR: 18 (01-13-19 @ 04:45) (18 - 18)  SpO2: 96% (01-13-19 @ 04:45) (96% - 99%)  Wt(kg): --    I&O's Summary    12 Jan 2019 07:01  -  13 Jan 2019 07:00  --------------------------------------------------------  IN: 1060 mL / OUT: 2100 mL / NET: -1040 mL        HEENT:  (-)icterus (-)pallor  CV: N S1 S2 1/6 ROSALINDA (+)2 Pulses B/l  Resp:  Clear to ausculatation B/L, normal effort  GI: (+) BS Soft, NT, ND  Lymph:  (-)Edema, (-)obvious lymphadenopathy  Skin: Warm to touch, Normal turgor  Psych: Appropriate mood and affect        ECG:    A sense V paced	    RADIOLOGY:         CXR:  No infiltartes     ASSESSMENT/PLAN: 	63y Female  F with PMHx of NICM s/p AICD, HTN, moderate-severe MR, reportedly recent LHC at [place] was negative, thyroid cancer s/p thyroidectomy, DM,  admitted with decompensated acute on chronic systolic heart failure.    - ASA,  statin   - tele stable   - cont diuresis on Bumex PO  / Primacor gtt   - heart failure follow up - Set up for d/c with primacor gtt at home   - cont hydralizine , aldactone BP stable   - pt tolerating BB - BP stable , no orthostatic s/s   -  GI / DVT prophylaxis,  keep K>4, mag >2.0   D/W Dr Grimes Subjective:  pt seen and examined, no complaints    Tele : NSR/ ST  NO chest pain or sob on exam     acetaminophen   Tablet .. 650 milliGRAM(s) Oral every 6 hours PRN  acetaminophen  IVPB .. 1000 milliGRAM(s) IV Intermittent once  aspirin enteric coated 81 milliGRAM(s) Oral daily  buMETAnide 4 milliGRAM(s) Oral two times a day  calcitriol   Capsule 0.25 MICROGram(s) Oral daily  calcium acetate 667 milliGRAM(s) Oral three times a day with meals  calcium carbonate 1250 mG  + Vitamin D (OsCal 500 + D) 1 Tablet(s) Oral two times a day  chlorhexidine 4% Liquid 1 Application(s) Topical <User Schedule>  dextrose 40% Gel 15 Gram(s) Oral once PRN  dextrose 5%. 1000 milliLiter(s) IV Continuous <Continuous>  dextrose 50% Injectable 25 Gram(s) IV Push once  gabapentin 200 milliGRAM(s) Oral three times a day  gabapentin 100 milliGRAM(s) Oral at bedtime  glucagon  Injectable 1 milliGRAM(s) IntraMuscular once PRN  guaiFENesin   Syrup  (Sugar-Free) 200 milliGRAM(s) Oral every 6 hours PRN  hydrALAZINE 10 milliGRAM(s) Oral every 8 hours  insulin glargine Injectable (LANTUS) 10 Unit(s) SubCutaneous at bedtime  insulin lispro (HumaLOG) corrective regimen sliding scale   SubCutaneous three times a day before meals  insulin lispro (HumaLOG) corrective regimen sliding scale   SubCutaneous at bedtime  insulin lispro Injectable (HumaLOG) 3 Unit(s) SubCutaneous before breakfast  insulin lispro Injectable (HumaLOG) 3 Unit(s) SubCutaneous before lunch  insulin lispro Injectable (HumaLOG) 3 Unit(s) SubCutaneous before dinner  levothyroxine 175 MICROGram(s) Oral daily  magnesium oxide 400 milliGRAM(s) Oral two times a day with meals  metolazone 2.5 milliGRAM(s) Oral <User Schedule>  metoprolol succinate ER 12.5 milliGRAM(s) Oral daily  milrinone Infusion 0.5 MICROgram(s)/kG/Min IV Continuous <Continuous>  pantoprazole    Tablet 40 milliGRAM(s) Oral before breakfast  potassium chloride    Tablet ER 20 milliEquivalent(s) Oral daily  rifaximin 550 milliGRAM(s) Oral two times a day  spironolactone 50 milliGRAM(s) Oral two times a day                            13.1   8.1   )-----------( 343      ( 12 Jan 2019 06:41 )             41.3       Hemoglobin: 13.1 g/dL (01-12 @ 06:41)  Hemoglobin: 12.4 g/dL (01-10 @ 07:22)  Hemoglobin: 11.1 g/dL (01-09 @ 08:06)  Hemoglobin: 10.1 g/dL (01-08 @ 08:09)      01-12    125<L>  |  76<L>  |  74<H>  ----------------------------<  117<H>  3.3<L>   |  29  |  1.20    Ca    9.5      12 Jan 2019 06:41  Mg     2.2     01-12      Creatinine Trend: 1.20<--, 1.30<--, 1.08<--, 1.12<--, 1.24<--, 1.16<--    COAGS:           T(C): 36.6 (01-13-19 @ 04:45), Max: 36.6 (01-13-19 @ 04:45)  HR: 101 (01-13-19 @ 04:45) (101 - 111)  BP: 109/70 (01-13-19 @ 04:45) (104/71 - 112/77)  RR: 18 (01-13-19 @ 04:45) (18 - 18)  SpO2: 96% (01-13-19 @ 04:45) (96% - 99%)  Wt(kg): --    I&O's Summary    12 Jan 2019 07:01  -  13 Jan 2019 07:00  --------------------------------------------------------  IN: 1060 mL / OUT: 2100 mL / NET: -1040 mL        HEENT:  (-)icterus (-)pallor  CV: N S1 S2 1/6 ROSALINDA (+)2 Pulses B/l  Resp:  Clear to ausculatation B/L, normal effort  GI: (+) BS Soft, NT, ND  Lymph:  (-)Edema, (-)obvious lymphadenopathy  Skin: Warm to touch, Normal turgor  Psych: Appropriate mood and affect        ECG:    A sense V paced	    RADIOLOGY:         CXR:  No infiltartes     ASSESSMENT/PLAN: 	63y Female  F with PMHx of NICM s/p AICD, HTN, moderate-severe MR, reportedly recent LHC at [place] was negative, thyroid cancer s/p thyroidectomy, DM,  admitted with decompensated acute on chronic systolic heart failure.    - ASA,  statin   - tele stable   - cont diuresis on Bumex PO  / Primacor gtt   - heart failure follow up - Set up for d/c with primacor gtt at home   - cont hydralizine , aldactone BP stable   - pt tolerating BB - BP stable , no orthostatic s/s   -  GI / DVT prophylaxis,  keep K>4, mag >2.0

## 2019-01-13 NOTE — PROGRESS NOTE ADULT - SUBJECTIVE AND OBJECTIVE BOX
Subjective:  pt seen and examined, no complaints    Tele : NSR    MEDICATIONS  (STANDING):  acetaminophen  IVPB .. 1000 milliGRAM(s) IV Intermittent once  aspirin enteric coated 81 milliGRAM(s) Oral daily  buMETAnide 4 milliGRAM(s) Oral two times a day  calcitriol   Capsule 0.25 MICROGram(s) Oral daily  calcium acetate 667 milliGRAM(s) Oral three times a day with meals  calcium carbonate 1250 mG  + Vitamin D (OsCal 500 + D) 1 Tablet(s) Oral two times a day  chlorhexidine 4% Liquid 1 Application(s) Topical <User Schedule>  dextrose 5%. 1000 milliLiter(s) (50 mL/Hr) IV Continuous <Continuous>  dextrose 50% Injectable 25 Gram(s) IV Push once  gabapentin 200 milliGRAM(s) Oral three times a day  gabapentin 100 milliGRAM(s) Oral at bedtime  hydrALAZINE 10 milliGRAM(s) Oral every 8 hours  insulin glargine Injectable (LANTUS) 15 Unit(s) SubCutaneous at bedtime  insulin lispro (HumaLOG) corrective regimen sliding scale   SubCutaneous three times a day before meals  insulin lispro (HumaLOG) corrective regimen sliding scale   SubCutaneous at bedtime  insulin lispro Injectable (HumaLOG) 6 Unit(s) SubCutaneous before breakfast  insulin lispro Injectable (HumaLOG) 6 Unit(s) SubCutaneous before lunch  insulin lispro Injectable (HumaLOG) 6 Unit(s) SubCutaneous before dinner  levothyroxine 175 MICROGram(s) Oral daily  magnesium oxide 400 milliGRAM(s) Oral two times a day with meals  metolazone 2.5 milliGRAM(s) Oral <User Schedule>  metoprolol succinate ER 12.5 milliGRAM(s) Oral daily  milrinone Infusion 0.5 MICROgram(s)/kG/Min (11.415 mL/Hr) IV Continuous <Continuous>  pantoprazole    Tablet 40 milliGRAM(s) Oral before breakfast  potassium chloride    Tablet ER 20 milliEquivalent(s) Oral daily  rifaximin 550 milliGRAM(s) Oral two times a day  spironolactone 50 milliGRAM(s) Oral two times a day    MEDICATIONS  (PRN):  acetaminophen   Tablet .. 650 milliGRAM(s) Oral every 6 hours PRN Mild Pain (1 - 3)  dextrose 40% Gel 15 Gram(s) Oral once PRN Blood Glucose LESS THAN 70 milliGRAM(s)/deciliter  glucagon  Injectable 1 milliGRAM(s) IntraMuscular once PRN Glucose LESS THAN 70 milligrams/deciliter  guaiFENesin   Syrup  (Sugar-Free) 200 milliGRAM(s) Oral every 6 hours PRN Cough      LABS:                        12.6   7.08  )-----------( 325      ( 13 Jan 2019 08:29 )             37.0     125<L>  |  76<L>  |  74<H>  ----------------------------<  117<H>  3.3<L>   |  29  |  1.20    Ca    9.5      12 Jan 2019 06:41  Mg     2.2     01-12    Creatinine Trend: 1.20<--, 1.30<--, 1.08<--, 1.12<--, 1.24<--, 1.16<--     PHYSICAL EXAM  Vital Signs Last 24 Hrs  T(C): 36.3 (13 Jan 2019 14:20), Max: 36.6 (13 Jan 2019 04:45)  T(F): 97.3 (13 Jan 2019 14:20), Max: 97.9 (13 Jan 2019 04:45)  HR: 107 (13 Jan 2019 14:20) (101 - 107)  BP: 108/70 (13 Jan 2019 14:20) (108/70 - 112/77)  RR: 17 (13 Jan 2019 14:20) (17 - 18)  SpO2: 97% (13 Jan 2019 14:20) (96% - 99%)         HEENT:  (-)icterus (-)pallor  CV: N S1 S2 1/6 ROSALINDA (+)2 Pulses B/l  Resp:  Clear to ausculatation B/L, normal effort  GI: (+) BS Soft, NT, ND  Lymph:  (-)Edema, (-)obvious lymphadenopathy  Skin: Warm to touch, Normal turgor  Psych: Appropriate mood and affect    < from: TTE with Doppler (w/Cont) (11.07.18 @ 05:53) >  Conclusions:  1. Tethered mitral valve leaflets with normal opening.  Mitral annular calcification and thickened  mitral leaflet  tips Moderate mitral regurgitation.  2. Calcified trileaflet aortic valve with normal opening.  3. Moderately dilated left atrium.  LA volume index = 43  cc/m2.  4. Eccentric left ventricular hypertrophy (dilated left  ventricle with normal relative wall thickness).  5. Severe global left ventricularsystolic dysfunction.  Endocardial visualization enhanced with intravenous  injection of Ultrasonic Enhancing Agent (Definity). No LV  thrombus.  Septal flattening consistent with right  ventricular overload.  6. Severe diastolic dysfunction with elevated filling  pressures  7. Severe right atrial enlargement.  8. Right ventricular enlargement with decreased right  ventricular systolic function.  A device wire is noted in  the right heart.  9. Dilated tricuspid annulus with malcoaptation of  tricuspid leaflets. Severe tricuspid regurgitation.  *** No previous Echo exam.  ------------------------------------------------------------------------  Confirmed on  11/7/2018 - 16:40:23 by Haley Koch M.D.    < end of copied text >      ASSESSMENT/PLAN: 	63y Female  F with PMHx of NICM s/p AICD, HTN, moderate-severe MR, reportedly recent LHC at [place] was negative, thyroid cancer s/p thyroidectomy, DM,  admitted with decompensated acute on chronic systolic heart failure.    --transitioned to PO Bumex  --Remains on milrinone GTT  --DC planning for 1/14 when pump can be set up/arranged at home  --Close op F/U with Dr Robbins and CHF clinic

## 2019-01-13 NOTE — PROGRESS NOTE ADULT - SUBJECTIVE AND OBJECTIVE BOX
Subjective:  no chest pain or sob     Tele : NSR,       acetaminophen   Tablet .. 650 milliGRAM(s) Oral every 6 hours PRN  acetaminophen  IVPB .. 1000 milliGRAM(s) IV Intermittent once  aspirin enteric coated 81 milliGRAM(s) Oral daily  buMETAnide 4 milliGRAM(s) Oral two times a day  calcitriol   Capsule 0.25 MICROGram(s) Oral daily  calcium acetate 667 milliGRAM(s) Oral three times a day with meals  calcium carbonate 1250 mG  + Vitamin D (OsCal 500 + D) 1 Tablet(s) Oral two times a day  chlorhexidine 4% Liquid 1 Application(s) Topical <User Schedule>  dextrose 40% Gel 15 Gram(s) Oral once PRN  dextrose 5%. 1000 milliLiter(s) IV Continuous <Continuous>  dextrose 50% Injectable 25 Gram(s) IV Push once  gabapentin 200 milliGRAM(s) Oral three times a day  gabapentin 100 milliGRAM(s) Oral at bedtime  glucagon  Injectable 1 milliGRAM(s) IntraMuscular once PRN  guaiFENesin   Syrup  (Sugar-Free) 200 milliGRAM(s) Oral every 6 hours PRN  hydrALAZINE 10 milliGRAM(s) Oral every 8 hours  insulin glargine Injectable (LANTUS) 15 Unit(s) SubCutaneous at bedtime  insulin lispro (HumaLOG) corrective regimen sliding scale   SubCutaneous three times a day before meals  insulin lispro (HumaLOG) corrective regimen sliding scale   SubCutaneous at bedtime  insulin lispro Injectable (HumaLOG) 6 Unit(s) SubCutaneous before breakfast  insulin lispro Injectable (HumaLOG) 6 Unit(s) SubCutaneous before lunch  insulin lispro Injectable (HumaLOG) 6 Unit(s) SubCutaneous before dinner  levothyroxine 175 MICROGram(s) Oral daily  magnesium oxide 400 milliGRAM(s) Oral two times a day with meals  metolazone 2.5 milliGRAM(s) Oral <User Schedule>  metoprolol succinate ER 12.5 milliGRAM(s) Oral daily  milrinone Infusion 0.5 MICROgram(s)/kG/Min IV Continuous <Continuous>  pantoprazole    Tablet 40 milliGRAM(s) Oral before breakfast  potassium chloride    Tablet ER 20 milliEquivalent(s) Oral daily  rifaximin 550 milliGRAM(s) Oral two times a day  spironolactone 50 milliGRAM(s) Oral two times a day                            12.6   7.08  )-----------( 325      ( 13 Jan 2019 08:29 )             37.0       01-12    125<L>  |  76<L>  |  74<H>  ----------------------------<  117<H>  3.3<L>   |  29  |  1.20    Ca    9.5      12 Jan 2019 06:41  Mg     2.2     01-12              T(C): 36.6 (01-13-19 @ 04:45), Max: 36.6 (01-13-19 @ 04:45)  HR: 101 (01-13-19 @ 04:45) (101 - 111)  BP: 109/70 (01-13-19 @ 04:45) (104/71 - 112/77)  RR: 18 (01-13-19 @ 04:45) (18 - 18)  SpO2: 96% (01-13-19 @ 04:45) (96% - 99%)  Wt(kg): --    I&O's Summary    12 Jan 2019 07:01  -  13 Jan 2019 07:00  --------------------------------------------------------  IN: 1060 mL / OUT: 2100 mL / NET: -1040 mL        HEENT:  (-)icterus (-)pallor  CV: N S1 S2 1/6 ROSALINDA (+)2 Pulses B/l  Resp:  Clear to ausculatation B/L, normal effort  GI: (+) BS Soft, NT, ND  Lymph:  (-)Edema, (-)obvious lymphadenopathy  Skin: Warm to touch, Normal turgor  Psych: Appropriate mood and affect        ECG:    A sense V paced	    RADIOLOGY:         CXR:  No infiltartes     ASSESSMENT/PLAN: 	63y Female  F with PMHx of NICM s/p AICD, HTN, moderate-severe MR, reportedly recent LHC at [place] was negative, thyroid cancer s/p thyroidectomy, DM,  admitted with decompensated acute on chronic systolic heart failure.    -no anginal symptoms   -continue with milrinone per heart failure  -appears euvolemic today  -no further interventional workup needed at this time or ischemic eval needed at this time   -primary care medicine    Kunal Muller MD

## 2019-01-13 NOTE — PROGRESS NOTE ADULT - ASSESSMENT
63F w/ PMHx of Hfref (mod-severe MR, EF 10% 11/2018) s/p ICD  s/p  PICC  on milrinone ,h/o  thyroid CA s/p resection c/b hypoparathyroidism, HTN, DMII, p/w worsening fluid retention      Problem/Plan - 1:  ·  Problem: Acute on Chronic Systolic CHF exacerbation.  Plan: Clinically improving.   -on Milrinone infusion and  Bumex PO now  .  - Spironolactone, hydralazine and Metolazone   - Heart failure and Cardiology helping.   - strict and outs   - daily weight    Problem/Plan - 2:  ·  Problem: DM (diabetes mellitus).  Plan: -Sugars high so increased Lantus as well Pre meal Insulin.   -Holding  oral hypoglycemics  -Start HISS, check fsg qac/qhs  -consistent carb diet.      Problem/Plan - 3:  ·  Problem: HTN (hypertension).  Plan: Well controlled.      Problem/Plan - 4:  ·  Problem: Hypothyroidism   Plan: - continue levothyroxine   - calcitriol.      Problem/Plan - 5:  ·  Problem: Hypocalcemia & Hyponatremia .  Plan: - Renal helping.   -calcium acetate   - calcium + D.      Problem/Plan - 6:  Problem: Anemia. Plan: HH stable. Work up in progress.    no active bleeding   - monitor CBC.     Problem/Plan - 7:  ·  Problem: Elevated liver function tests.  Plan:  2/2 congestive hepatopathy . LFT trending down.   - continue rifaximin.      Problem/Plan - 8:  ·  Problem: Diabetic neuropathy with Foot Pain .  Plan: - Continue Gabapentin and added extra dose at Bed time. . Podiatry consult noted.      Problem/Plan - 9:  ·  Problem: Moderate to Severe MR.  Plan: - May need Rpt TTE per cardiology.      Problem/Plan - 10:  ·  Problem: Need for prophylactic measure.  Plan: - sub q heparin.    Disposition : DC planning home .

## 2019-01-13 NOTE — PROGRESS NOTE ADULT - SUBJECTIVE AND OBJECTIVE BOX
INTERVAL HPI/OVERNIGHT EVENTS: no new concerns had shower .  Vital Signs Last 24 Hrs  T(C): 36.6 (13 Jan 2019 04:45), Max: 36.6 (13 Jan 2019 04:45)  T(F): 97.9 (13 Jan 2019 04:45), Max: 97.9 (13 Jan 2019 04:45)  HR: 101 (13 Jan 2019 04:45) (101 - 111)  BP: 109/70 (13 Jan 2019 04:45) (104/71 - 112/77)  BP(mean): --  RR: 18 (13 Jan 2019 04:45) (18 - 18)  SpO2: 96% (13 Jan 2019 04:45) (96% - 99%)  I&O's Summary    12 Jan 2019 07:01  -  13 Jan 2019 07:00  --------------------------------------------------------  IN: 1060 mL / OUT: 2100 mL / NET: -1040 mL      MEDICATIONS  (STANDING):  acetaminophen  IVPB .. 1000 milliGRAM(s) IV Intermittent once  aspirin enteric coated 81 milliGRAM(s) Oral daily  buMETAnide 4 milliGRAM(s) Oral two times a day  calcitriol   Capsule 0.25 MICROGram(s) Oral daily  calcium acetate 667 milliGRAM(s) Oral three times a day with meals  calcium carbonate 1250 mG  + Vitamin D (OsCal 500 + D) 1 Tablet(s) Oral two times a day  chlorhexidine 4% Liquid 1 Application(s) Topical <User Schedule>  dextrose 5%. 1000 milliLiter(s) (50 mL/Hr) IV Continuous <Continuous>  dextrose 50% Injectable 25 Gram(s) IV Push once  gabapentin 200 milliGRAM(s) Oral three times a day  gabapentin 100 milliGRAM(s) Oral at bedtime  hydrALAZINE 10 milliGRAM(s) Oral every 8 hours  insulin glargine Injectable (LANTUS) 10 Unit(s) SubCutaneous at bedtime  insulin lispro (HumaLOG) corrective regimen sliding scale   SubCutaneous three times a day before meals  insulin lispro (HumaLOG) corrective regimen sliding scale   SubCutaneous at bedtime  insulin lispro Injectable (HumaLOG) 3 Unit(s) SubCutaneous before breakfast  insulin lispro Injectable (HumaLOG) 3 Unit(s) SubCutaneous before lunch  insulin lispro Injectable (HumaLOG) 3 Unit(s) SubCutaneous before dinner  levothyroxine 175 MICROGram(s) Oral daily  magnesium oxide 400 milliGRAM(s) Oral two times a day with meals  metolazone 2.5 milliGRAM(s) Oral <User Schedule>  metoprolol succinate ER 12.5 milliGRAM(s) Oral daily  milrinone Infusion 0.5 MICROgram(s)/kG/Min (11.415 mL/Hr) IV Continuous <Continuous>  pantoprazole    Tablet 40 milliGRAM(s) Oral before breakfast  potassium chloride    Tablet ER 20 milliEquivalent(s) Oral daily  rifaximin 550 milliGRAM(s) Oral two times a day  spironolactone 50 milliGRAM(s) Oral two times a day    MEDICATIONS  (PRN):  acetaminophen   Tablet .. 650 milliGRAM(s) Oral every 6 hours PRN Mild Pain (1 - 3)  dextrose 40% Gel 15 Gram(s) Oral once PRN Blood Glucose LESS THAN 70 milliGRAM(s)/deciliter  glucagon  Injectable 1 milliGRAM(s) IntraMuscular once PRN Glucose LESS THAN 70 milligrams/deciliter  guaiFENesin   Syrup  (Sugar-Free) 200 milliGRAM(s) Oral every 6 hours PRN Cough    LABS:                        12.6   7.08  )-----------( 325      ( 13 Jan 2019 08:29 )             37.0     01-12    125<L>  |  76<L>  |  74<H>  ----------------------------<  117<H>  3.3<L>   |  29  |  1.20    Ca    9.5      12 Jan 2019 06:41  Mg     2.2     01-12          CAPILLARY BLOOD GLUCOSE      POCT Blood Glucose.: 235 mg/dL (13 Jan 2019 07:29)  POCT Blood Glucose.: 364 mg/dL (12 Jan 2019 21:46)  POCT Blood Glucose.: 188 mg/dL (12 Jan 2019 17:14)  POCT Blood Glucose.: 316 mg/dL (12 Jan 2019 11:59)          REVIEW OF SYSTEMS:  CONSTITUTIONAL: No fever, weight loss, or fatigue  EYES: No eye pain, visual disturbances, or discharge  ENMT:  No difficulty hearing, tinnitus, vertigo; No sinus or throat pain  NECK: No pain or stiffness  RESPIRATORY: No cough, wheezing, chills or hemoptysis; No shortness of breath  CARDIOVASCULAR: No chest pain, palpitations, dizziness, or leg swelling  GASTROINTESTINAL: No abdominal or epigastric pain. No nausea, vomiting, or hematemesis; No diarrhea or constipation. No melena or hematochezia.  GENITOURINARY: No dysuria, frequency, hematuria, or incontinence  NEUROLOGICAL: No headaches, memory loss, loss of strength, numbness, or tremors      Consultant(s) Notes Reviewed:  [x ] YES  [ ] NO    PHYSICAL EXAM:  GENERAL: NAD,not in any distress ,  HEAD:  Atraumatic, Normocephalic  EYES: EOMI, PERRLA, conjunctiva and sclera clear  NECK: Supple, No JVD, Normal thyroid  NERVOUS SYSTEM:  Alert & Oriented X3, No focal deficit   CHEST/LUNG: Good air entry bilateral with no  rales, rhonchi, wheezing, or rubs  HEART: Regular rate and rhythm; No murmurs, rubs, or gallops  ABDOMEN: Soft, Nontender, Nondistended; Bowel sounds present  EXTREMITIES:  2+ Peripheral Pulses, No clubbing, cyanosis, or edema    Care Discussed with Consultants/Other Providers [ x] YES  [ ] NO

## 2019-01-14 VITALS
RESPIRATION RATE: 18 BRPM | OXYGEN SATURATION: 98 % | DIASTOLIC BLOOD PRESSURE: 74 MMHG | HEART RATE: 105 BPM | TEMPERATURE: 98 F | SYSTOLIC BLOOD PRESSURE: 115 MMHG

## 2019-01-14 LAB
ANION GAP SERPL CALC-SCNC: 17 MMOL/L — SIGNIFICANT CHANGE UP (ref 5–17)
ANION GAP SERPL CALC-SCNC: 17 MMOL/L — SIGNIFICANT CHANGE UP (ref 5–17)
ANION GAP SERPL CALC-SCNC: 18 MMOL/L — HIGH (ref 5–17)
ANION GAP SERPL CALC-SCNC: 18 MMOL/L — HIGH (ref 5–17)
BUN SERPL-MCNC: 84 MG/DL — HIGH (ref 7–23)
BUN SERPL-MCNC: 86 MG/DL — HIGH (ref 7–23)
BUN SERPL-MCNC: 86 MG/DL — HIGH (ref 7–23)
BUN SERPL-MCNC: 87 MG/DL — HIGH (ref 7–23)
CALCIUM SERPL-MCNC: 9.4 MG/DL — SIGNIFICANT CHANGE UP (ref 8.4–10.5)
CALCIUM SERPL-MCNC: 9.5 MG/DL — SIGNIFICANT CHANGE UP (ref 8.4–10.5)
CHLORIDE SERPL-SCNC: 73 MMOL/L — LOW (ref 96–108)
CHLORIDE SERPL-SCNC: 74 MMOL/L — LOW (ref 96–108)
CHLORIDE SERPL-SCNC: 74 MMOL/L — LOW (ref 96–108)
CHLORIDE SERPL-SCNC: 76 MMOL/L — LOW (ref 96–108)
CO2 SERPL-SCNC: 28 MMOL/L — SIGNIFICANT CHANGE UP (ref 22–31)
CO2 SERPL-SCNC: 28 MMOL/L — SIGNIFICANT CHANGE UP (ref 22–31)
CO2 SERPL-SCNC: 29 MMOL/L — SIGNIFICANT CHANGE UP (ref 22–31)
CO2 SERPL-SCNC: 31 MMOL/L — SIGNIFICANT CHANGE UP (ref 22–31)
CREAT SERPL-MCNC: 1.23 MG/DL — SIGNIFICANT CHANGE UP (ref 0.5–1.3)
CREAT SERPL-MCNC: 1.24 MG/DL — SIGNIFICANT CHANGE UP (ref 0.5–1.3)
CREAT SERPL-MCNC: 1.26 MG/DL — SIGNIFICANT CHANGE UP (ref 0.5–1.3)
CREAT SERPL-MCNC: 1.26 MG/DL — SIGNIFICANT CHANGE UP (ref 0.5–1.3)
GLUCOSE BLDC GLUCOMTR-MCNC: 179 MG/DL — HIGH (ref 70–99)
GLUCOSE BLDC GLUCOMTR-MCNC: 192 MG/DL — HIGH (ref 70–99)
GLUCOSE BLDC GLUCOMTR-MCNC: 242 MG/DL — HIGH (ref 70–99)
GLUCOSE BLDC GLUCOMTR-MCNC: 267 MG/DL — HIGH (ref 70–99)
GLUCOSE SERPL-MCNC: 171 MG/DL — HIGH (ref 70–99)
GLUCOSE SERPL-MCNC: 191 MG/DL — HIGH (ref 70–99)
GLUCOSE SERPL-MCNC: 194 MG/DL — HIGH (ref 70–99)
GLUCOSE SERPL-MCNC: 280 MG/DL — HIGH (ref 70–99)
HCT VFR BLD CALC: 37.2 % — SIGNIFICANT CHANGE UP (ref 34.5–45)
HGB BLD-MCNC: 12.7 G/DL — SIGNIFICANT CHANGE UP (ref 11.5–15.5)
MAGNESIUM SERPL-MCNC: 2.2 MG/DL — SIGNIFICANT CHANGE UP (ref 1.6–2.6)
MAGNESIUM SERPL-MCNC: 2.3 MG/DL — SIGNIFICANT CHANGE UP (ref 1.6–2.6)
MCHC RBC-ENTMCNC: 22.6 PG — LOW (ref 27–34)
MCHC RBC-ENTMCNC: 34.1 GM/DL — SIGNIFICANT CHANGE UP (ref 32–36)
MCV RBC AUTO: 66.1 FL — LOW (ref 80–100)
PHOSPHATE SERPL-MCNC: 6.2 MG/DL — HIGH (ref 2.5–4.5)
PLATELET # BLD AUTO: 336 K/UL — SIGNIFICANT CHANGE UP (ref 150–400)
POTASSIUM SERPL-MCNC: 2.9 MMOL/L — CRITICAL LOW (ref 3.5–5.3)
POTASSIUM SERPL-MCNC: 3 MMOL/L — LOW (ref 3.5–5.3)
POTASSIUM SERPL-MCNC: 3.7 MMOL/L — SIGNIFICANT CHANGE UP (ref 3.5–5.3)
POTASSIUM SERPL-MCNC: 5.3 MMOL/L — SIGNIFICANT CHANGE UP (ref 3.5–5.3)
POTASSIUM SERPL-SCNC: 2.9 MMOL/L — CRITICAL LOW (ref 3.5–5.3)
POTASSIUM SERPL-SCNC: 3 MMOL/L — LOW (ref 3.5–5.3)
POTASSIUM SERPL-SCNC: 3.7 MMOL/L — SIGNIFICANT CHANGE UP (ref 3.5–5.3)
POTASSIUM SERPL-SCNC: 5.3 MMOL/L — SIGNIFICANT CHANGE UP (ref 3.5–5.3)
RBC # BLD: 5.63 M/UL — HIGH (ref 3.8–5.2)
RBC # FLD: 22.2 % — HIGH (ref 10.3–14.5)
SODIUM SERPL-SCNC: 118 MMOL/L — CRITICAL LOW (ref 135–145)
SODIUM SERPL-SCNC: 121 MMOL/L — LOW (ref 135–145)
SODIUM SERPL-SCNC: 121 MMOL/L — LOW (ref 135–145)
SODIUM SERPL-SCNC: 122 MMOL/L — LOW (ref 135–145)
WBC # BLD: 7.63 K/UL — SIGNIFICANT CHANGE UP (ref 3.8–10.5)
WBC # FLD AUTO: 7.63 K/UL — SIGNIFICANT CHANGE UP (ref 3.8–10.5)

## 2019-01-14 PROCEDURE — 80048 BASIC METABOLIC PNL TOTAL CA: CPT

## 2019-01-14 PROCEDURE — 83880 ASSAY OF NATRIURETIC PEPTIDE: CPT

## 2019-01-14 PROCEDURE — 83605 ASSAY OF LACTIC ACID: CPT

## 2019-01-14 PROCEDURE — 93005 ELECTROCARDIOGRAM TRACING: CPT

## 2019-01-14 PROCEDURE — 83550 IRON BINDING TEST: CPT

## 2019-01-14 PROCEDURE — 82962 GLUCOSE BLOOD TEST: CPT

## 2019-01-14 PROCEDURE — 84100 ASSAY OF PHOSPHORUS: CPT

## 2019-01-14 PROCEDURE — 99285 EMERGENCY DEPT VISIT HI MDM: CPT | Mod: 25

## 2019-01-14 PROCEDURE — 82272 OCCULT BLD FECES 1-3 TESTS: CPT

## 2019-01-14 PROCEDURE — 83036 HEMOGLOBIN GLYCOSYLATED A1C: CPT

## 2019-01-14 PROCEDURE — 85610 PROTHROMBIN TIME: CPT

## 2019-01-14 PROCEDURE — 84295 ASSAY OF SERUM SODIUM: CPT

## 2019-01-14 PROCEDURE — 85014 HEMATOCRIT: CPT

## 2019-01-14 PROCEDURE — 82803 BLOOD GASES ANY COMBINATION: CPT

## 2019-01-14 PROCEDURE — 73630 X-RAY EXAM OF FOOT: CPT

## 2019-01-14 PROCEDURE — 82435 ASSAY OF BLOOD CHLORIDE: CPT

## 2019-01-14 PROCEDURE — 97161 PT EVAL LOW COMPLEX 20 MIN: CPT

## 2019-01-14 PROCEDURE — 80076 HEPATIC FUNCTION PANEL: CPT

## 2019-01-14 PROCEDURE — 76937 US GUIDE VASCULAR ACCESS: CPT

## 2019-01-14 PROCEDURE — 82947 ASSAY GLUCOSE BLOOD QUANT: CPT

## 2019-01-14 PROCEDURE — 85730 THROMBOPLASTIN TIME PARTIAL: CPT

## 2019-01-14 PROCEDURE — 97116 GAIT TRAINING THERAPY: CPT

## 2019-01-14 PROCEDURE — 83540 ASSAY OF IRON: CPT

## 2019-01-14 PROCEDURE — 96374 THER/PROPH/DIAG INJ IV PUSH: CPT

## 2019-01-14 PROCEDURE — 82330 ASSAY OF CALCIUM: CPT

## 2019-01-14 PROCEDURE — 99233 SBSQ HOSP IP/OBS HIGH 50: CPT

## 2019-01-14 PROCEDURE — 85027 COMPLETE CBC AUTOMATED: CPT

## 2019-01-14 PROCEDURE — 71045 X-RAY EXAM CHEST 1 VIEW: CPT

## 2019-01-14 PROCEDURE — 84484 ASSAY OF TROPONIN QUANT: CPT

## 2019-01-14 PROCEDURE — 80053 COMPREHEN METABOLIC PANEL: CPT

## 2019-01-14 PROCEDURE — 97530 THERAPEUTIC ACTIVITIES: CPT

## 2019-01-14 PROCEDURE — 84132 ASSAY OF SERUM POTASSIUM: CPT

## 2019-01-14 PROCEDURE — 82728 ASSAY OF FERRITIN: CPT

## 2019-01-14 PROCEDURE — 83735 ASSAY OF MAGNESIUM: CPT

## 2019-01-14 RX ORDER — POTASSIUM CHLORIDE 20 MEQ
40 PACKET (EA) ORAL
Qty: 0 | Refills: 0 | Status: DISCONTINUED | OUTPATIENT
Start: 2019-01-14 | End: 2019-01-14

## 2019-01-14 RX ORDER — POTASSIUM CHLORIDE 20 MEQ
40 PACKET (EA) ORAL EVERY 4 HOURS
Qty: 0 | Refills: 0 | Status: DISCONTINUED | OUTPATIENT
Start: 2019-01-14 | End: 2019-01-15

## 2019-01-14 RX ORDER — POTASSIUM CHLORIDE 20 MEQ
2 PACKET (EA) ORAL
Qty: 60 | Refills: 0 | OUTPATIENT
Start: 2019-01-14 | End: 2019-02-12

## 2019-01-14 RX ORDER — POTASSIUM CHLORIDE 20 MEQ
40 PACKET (EA) ORAL
Qty: 0 | Refills: 0 | Status: COMPLETED | OUTPATIENT
Start: 2019-01-14 | End: 2019-01-14

## 2019-01-14 RX ORDER — TOLVAPTAN 15 MG/1
15 TABLET ORAL ONCE
Qty: 0 | Refills: 0 | Status: COMPLETED | OUTPATIENT
Start: 2019-01-14 | End: 2019-01-14

## 2019-01-14 RX ADMIN — SPIRONOLACTONE 50 MILLIGRAM(S): 25 TABLET, FILM COATED ORAL at 17:41

## 2019-01-14 RX ADMIN — Medication 12.5 MILLIGRAM(S): at 06:13

## 2019-01-14 RX ADMIN — Medication 40 MILLIEQUIVALENT(S): at 12:43

## 2019-01-14 RX ADMIN — TOLVAPTAN 15 MILLIGRAM(S): 15 TABLET ORAL at 17:42

## 2019-01-14 RX ADMIN — MAGNESIUM OXIDE 400 MG ORAL TABLET 400 MILLIGRAM(S): 241.3 TABLET ORAL at 17:39

## 2019-01-14 RX ADMIN — Medication 40 MILLIEQUIVALENT(S): at 10:42

## 2019-01-14 RX ADMIN — Medication 81 MILLIGRAM(S): at 11:29

## 2019-01-14 RX ADMIN — Medication 6 UNIT(S): at 08:08

## 2019-01-14 RX ADMIN — Medication 2: at 17:47

## 2019-01-14 RX ADMIN — Medication 1 TABLET(S): at 06:13

## 2019-01-14 RX ADMIN — GABAPENTIN 200 MILLIGRAM(S): 400 CAPSULE ORAL at 06:14

## 2019-01-14 RX ADMIN — MAGNESIUM OXIDE 400 MG ORAL TABLET 400 MILLIGRAM(S): 241.3 TABLET ORAL at 08:09

## 2019-01-14 RX ADMIN — Medication 6 UNIT(S): at 17:47

## 2019-01-14 RX ADMIN — Medication 6 UNIT(S): at 11:27

## 2019-01-14 RX ADMIN — CALCITRIOL 0.25 MICROGRAM(S): 0.5 CAPSULE ORAL at 11:30

## 2019-01-14 RX ADMIN — Medication 10 MILLIGRAM(S): at 06:14

## 2019-01-14 RX ADMIN — Medication 1 TABLET(S): at 17:39

## 2019-01-14 RX ADMIN — PANTOPRAZOLE SODIUM 40 MILLIGRAM(S): 20 TABLET, DELAYED RELEASE ORAL at 06:13

## 2019-01-14 RX ADMIN — Medication 667 MILLIGRAM(S): at 08:09

## 2019-01-14 RX ADMIN — Medication 1: at 08:08

## 2019-01-14 RX ADMIN — GABAPENTIN 200 MILLIGRAM(S): 400 CAPSULE ORAL at 14:51

## 2019-01-14 RX ADMIN — Medication 10 MILLIGRAM(S): at 14:52

## 2019-01-14 RX ADMIN — CHLORHEXIDINE GLUCONATE 1 APPLICATION(S): 213 SOLUTION TOPICAL at 10:52

## 2019-01-14 RX ADMIN — MILRINONE LACTATE 11.41 MICROGRAM(S)/KG/MIN: 1 INJECTION, SOLUTION INTRAVENOUS at 08:07

## 2019-01-14 RX ADMIN — Medication 667 MILLIGRAM(S): at 11:31

## 2019-01-14 RX ADMIN — Medication 10 MILLIGRAM(S): at 22:19

## 2019-01-14 RX ADMIN — INSULIN GLARGINE 15 UNIT(S): 100 INJECTION, SOLUTION SUBCUTANEOUS at 22:19

## 2019-01-14 RX ADMIN — Medication 3: at 11:27

## 2019-01-14 RX ADMIN — BUMETANIDE 4 MILLIGRAM(S): 0.25 INJECTION INTRAMUSCULAR; INTRAVENOUS at 06:14

## 2019-01-14 RX ADMIN — Medication 40 MILLIEQUIVALENT(S): at 10:44

## 2019-01-14 RX ADMIN — Medication 40 MILLIEQUIVALENT(S): at 14:50

## 2019-01-14 RX ADMIN — GABAPENTIN 100 MILLIGRAM(S): 400 CAPSULE ORAL at 22:19

## 2019-01-14 RX ADMIN — Medication 175 MICROGRAM(S): at 06:14

## 2019-01-14 RX ADMIN — Medication 667 MILLIGRAM(S): at 17:39

## 2019-01-14 RX ADMIN — SPIRONOLACTONE 50 MILLIGRAM(S): 25 TABLET, FILM COATED ORAL at 06:13

## 2019-01-14 RX ADMIN — GABAPENTIN 200 MILLIGRAM(S): 400 CAPSULE ORAL at 22:19

## 2019-01-14 NOTE — PROGRESS NOTE ADULT - SUBJECTIVE AND OBJECTIVE BOX
Subjective:  pt seen and examined, no complaints    Tele : Vpaced       MEDICATIONS  (STANDING):  acetaminophen  IVPB .. 1000 milliGRAM(s) IV Intermittent once  aspirin enteric coated 81 milliGRAM(s) Oral daily  buMETAnide 4 milliGRAM(s) Oral two times a day  calcitriol   Capsule 0.25 MICROGram(s) Oral daily  calcium acetate 667 milliGRAM(s) Oral three times a day with meals  calcium carbonate 1250 mG  + Vitamin D (OsCal 500 + D) 1 Tablet(s) Oral two times a day  chlorhexidine 4% Liquid 1 Application(s) Topical <User Schedule>  dextrose 5%. 1000 milliLiter(s) (50 mL/Hr) IV Continuous <Continuous>  dextrose 50% Injectable 25 Gram(s) IV Push once  gabapentin 200 milliGRAM(s) Oral three times a day  gabapentin 100 milliGRAM(s) Oral at bedtime  hydrALAZINE 10 milliGRAM(s) Oral every 8 hours  insulin glargine Injectable (LANTUS) 15 Unit(s) SubCutaneous at bedtime  insulin lispro (HumaLOG) corrective regimen sliding scale   SubCutaneous three times a day before meals  insulin lispro (HumaLOG) corrective regimen sliding scale   SubCutaneous at bedtime  insulin lispro Injectable (HumaLOG) 6 Unit(s) SubCutaneous before breakfast  insulin lispro Injectable (HumaLOG) 6 Unit(s) SubCutaneous before lunch  insulin lispro Injectable (HumaLOG) 6 Unit(s) SubCutaneous before dinner  levothyroxine 175 MICROGram(s) Oral daily  magnesium oxide 400 milliGRAM(s) Oral two times a day with meals  metolazone 2.5 milliGRAM(s) Oral <User Schedule>  metoprolol succinate ER 12.5 milliGRAM(s) Oral daily  milrinone Infusion 0.5 MICROgram(s)/kG/Min (11.415 mL/Hr) IV Continuous <Continuous>  pantoprazole    Tablet 40 milliGRAM(s) Oral before breakfast  potassium chloride    Tablet ER 20 milliEquivalent(s) Oral daily  potassium chloride    Tablet ER 40 milliEquivalent(s) Oral every 2 hours  rifaximin 550 milliGRAM(s) Oral two times a day  spironolactone 50 milliGRAM(s) Oral two times a day  tolvaptan 15 milliGRAM(s) Oral once    MEDICATIONS  (PRN):  acetaminophen   Tablet .. 650 milliGRAM(s) Oral every 6 hours PRN Mild Pain (1 - 3)  dextrose 40% Gel 15 Gram(s) Oral once PRN Blood Glucose LESS THAN 70 milliGRAM(s)/deciliter  glucagon  Injectable 1 milliGRAM(s) IntraMuscular once PRN Glucose LESS THAN 70 milligrams/deciliter  guaiFENesin   Syrup  (Sugar-Free) 200 milliGRAM(s) Oral every 6 hours PRN Cough      LABS:                        12.7   7.63  )-----------( 336      ( 14 Jan 2019 08:10 )             37.2     Hemoglobin: 12.7 g/dL (01-14 @ 08:10)  Hemoglobin: 12.6 g/dL (01-13 @ 08:29)  Hemoglobin: 13.1 g/dL (01-12 @ 06:41)  Hemoglobin: 12.4 g/dL (01-10 @ 07:22)    01-14    122<L>  |  74<L>  |  87<H>  ----------------------------<  194<H>  3.0<L>   |  31  |  1.23    Ca    9.5      14 Jan 2019 06:34  Phos  6.2     01-14  Mg     2.2     01-14      Creatinine Trend: 1.23<--, 1.20<--, 1.30<--, 1.08<--, 1.12<--, 1.24<--           PHYSICAL EXAM  Vital Signs Last 24 Hrs  T(C): 36.8 (14 Jan 2019 04:34), Max: 36.8 (14 Jan 2019 04:34)  T(F): 98.3 (14 Jan 2019 04:34), Max: 98.3 (14 Jan 2019 04:34)  HR: 106 (14 Jan 2019 06:09) (100 - 110)  BP: 134/88 (14 Jan 2019 06:09) (93/64 - 134/88)  BP(mean): --  RR: 18 (14 Jan 2019 04:34) (17 - 18)  SpO2: 96% (14 Jan 2019 04:34) (96% - 99%)       HEENT:  (-)icterus (-)pallor  CV: N S1 S2 1/6 ROSALINDA (+)2 Pulses B/l  Resp:  Clear to ausculatation B/L, normal effort  GI: (+) BS Soft, NT, ND  Lymph:  (-)Edema, (-)obvious lymphadenopathy  Skin: Warm to touch, Normal turgor  Psych: Appropriate mood and affect    < from: TTE with Doppler (w/Cont) (11.07.18 @ 05:53) >  Conclusions:  1. Tethered mitral valve leaflets with normal opening.  Mitral annular calcification and thickened  mitral leaflet  tips Moderate mitral regurgitation.  2. Calcified trileaflet aortic valve with normal opening.  3. Moderately dilated left atrium.  LA volume index = 43  cc/m2.  4. Eccentric left ventricular hypertrophy (dilated left  ventricle with normal relative wall thickness).  5. Severe global left ventricularsystolic dysfunction.  Endocardial visualization enhanced with intravenous  injection of Ultrasonic Enhancing Agent (Definity). No LV  thrombus.  Septal flattening consistent with right  ventricular overload.  6. Severe diastolic dysfunction with elevated filling  pressures  7. Severe right atrial enlargement.  8. Right ventricular enlargement with decreased right  ventricular systolic function.  A device wire is noted in  the right heart.  9. Dilated tricuspid annulus with malcoaptation of  tricuspid leaflets. Severe tricuspid regurgitation.  *** No previous Echo exam.  ------------------------------------------------------------------------  Confirmed on  11/7/2018 - 16:40:23 by Haley Koch M.D.    < end of copied text >      ASSESSMENT/PLAN: 	63y Female  F with PMHx of NICM s/p AICD, HTN, moderate-severe MR, reportedly recent LHC at [place] was negative, thyroid cancer s/p thyroidectomy, DM,  admitted with decompensated acute on chronic systolic heart failure.    --transitioned to PO Bumex, tolerating well  --Continues on milrinone GTT  --DC planning tentatively for 1/14 when pump can be set up/arranged at home  --Close op F/U with Dr Robbins on 01/16/2019 At 11:15am and CHF clinic     Adele Miller PA-C

## 2019-01-14 NOTE — CHART NOTE - NSCHARTNOTEFT_GEN_A_CORE
Nutrition Follow Up Note  Patient seen for nutrition follow up.  Chart reviewed.  Events noted.    Per chart, "63F w/ PMHx of Hfref (mod-severe MR, EF 10% 11/2018) s/p ICD  s/p  PICC  on milrinone ,h/o  thyroid CA s/p resection c/b hypoparathyroidism, HTN, DMII, p/w worsening fluid retention." Nephrology following fo rhypokalemia, hyponatremia and hypocalcemia.    Source: Pt, electronic medical record    Diet: consistent carbohydrate, DASH, 1200 mL fluid restriction    Patient reports appetite and intake have remained stable.  Per flowsheets, pt consistently consuming >75% of meals (01/05-01/14/19).  Pt denies nausea/vomiting.  Per Pt, no concerns with bowel movements (last BM 1/12 per chart).  Per Pt report, occasional abdominal cramping 2/2 potassium chloride tablets, but this does not appear to impact intake.  Pt notes she should be going home today.  RD offered review of CHF dietary restrictions; Pt confirms she has handout from previous RD visit and politely declined review at this time.    Review of labs indicate elevated Phos and POCT blood glucose.  Per chart, adjustments made to Lantus and premeal insulin to address elevated blood glucose.  Noted Phos high despite PhosLo.  Consider no concentrated phosphorus diet restriction - RD will discuss with NP.    Daily Weights:  01/14   75 kg  01/13   75.7 kg  01/12   76.1 kg  01/10   76.6 kg  Noted trend down in weight (intentional, as Pt with diuresis --- aldactone and Bumex).    Pertinent Medications: MEDICATIONS  (STANDING):  acetaminophen  IVPB .. 1000 milliGRAM(s) IV Intermittent once  aspirin enteric coated 81 milliGRAM(s) Oral daily  buMETAnide 4 milliGRAM(s) Oral two times a day  calcitriol   Capsule 0.25 MICROGram(s) Oral daily  calcium acetate 667 milliGRAM(s) Oral three times a day with meals  calcium carbonate 1250 mG  + Vitamin D (OsCal 500 + D) 1 Tablet(s) Oral two times a day  chlorhexidine 4% Liquid 1 Application(s) Topical <User Schedule>  dextrose 5%. 1000 milliLiter(s) (50 mL/Hr) IV Continuous <Continuous>  dextrose 50% Injectable 25 Gram(s) IV Push once  gabapentin 200 milliGRAM(s) Oral three times a day  gabapentin 100 milliGRAM(s) Oral at bedtime  hydrALAZINE 10 milliGRAM(s) Oral every 8 hours  insulin glargine Injectable (LANTUS) 15 Unit(s) SubCutaneous at bedtime  insulin lispro (HumaLOG) corrective regimen sliding scale   SubCutaneous three times a day before meals  insulin lispro (HumaLOG) corrective regimen sliding scale   SubCutaneous at bedtime  insulin lispro Injectable (HumaLOG) 6 Unit(s) SubCutaneous before breakfast  insulin lispro Injectable (HumaLOG) 6 Unit(s) SubCutaneous before lunch  insulin lispro Injectable (HumaLOG) 6 Unit(s) SubCutaneous before dinner  levothyroxine 175 MICROGram(s) Oral daily  magnesium oxide 400 milliGRAM(s) Oral two times a day with meals  metolazone 2.5 milliGRAM(s) Oral <User Schedule>  metoprolol succinate ER 12.5 milliGRAM(s) Oral daily  milrinone Infusion 0.5 MICROgram(s)/kG/Min (11.415 mL/Hr) IV Continuous <Continuous>  pantoprazole    Tablet 40 milliGRAM(s) Oral before breakfast  potassium chloride    Tablet ER 20 milliEquivalent(s) Oral daily  potassium chloride    Tablet ER 40 milliEquivalent(s) Oral every 4 hours  rifaximin 550 milliGRAM(s) Oral two times a day  spironolactone 50 milliGRAM(s) Oral two times a day    MEDICATIONS  (PRN):  acetaminophen   Tablet .. 650 milliGRAM(s) Oral every 6 hours PRN Mild Pain (1 - 3)  dextrose 40% Gel 15 Gram(s) Oral once PRN Blood Glucose LESS THAN 70 milliGRAM(s)/deciliter  glucagon  Injectable 1 milliGRAM(s) IntraMuscular once PRN Glucose LESS THAN 70 milligrams/deciliter  guaiFENesin   Syrup  (Sugar-Free) 200 milliGRAM(s) Oral every 6 hours PRN Cough    Pertinent Labs: 01-14 @ 13:30: Na 118<LL>, BUN 86<H>, Cr 1.26, <H>, K+ 3.7  01-14 @ 06:34: Na 122<L>, BUN 87<H>, Cr 1.23, <H>, K+ 3.0<L>, Phos 6.2<H>, Mg 2.2    Finger Sticks:  POCT Blood Glucose.: 267 mg/dL (01-14 @ 11:22)  POCT Blood Glucose.: 192 mg/dL (01-14 @ 07:38)  POCT Blood Glucose.: 293 mg/dL (01-13 @ 21:27)  POCT Blood Glucose.: 185 mg/dL (01-13 @ 17:05)      Skin per nursing documentation: no pressure injuries noted  Edema: 1+ l leg, R leg    Estimated Needs:   [ x] no change since previous assessment  [ ] recalculated:     Previous Nutrition Diagnosis: unintended weight gain  Nutrition Diagnosis is: addressed with diuresis, low Na diet restriction    New Nutrition Diagnosis: n/a     Interventions:     Recommend  1) Continue consistent carbohydrate, DASH diet restrictions.  Defer fluid restriction to medical team.  2) If elevated phos continues, consider no concentrated phos diet restriction (RD will discuss with NP).    Monitoring and Evaluation:     Continue to monitor Nutritional intake, Tolerance to diet prescription, weights, labs, skin integrity    RD remains available upon request and will follow up per protocol    Amanda Mccord MS, RD Pager# [phone] Nutrition Follow Up Note  Patient seen for nutrition follow up.  Chart reviewed.  Events noted.    Per chart, "63F w/ PMHx of Hfref (mod-severe MR, EF 10% 11/2018) s/p ICD  s/p  PICC  on milrinone ,h/o  thyroid CA s/p resection c/b hypoparathyroidism, HTN, DMII, p/w worsening fluid retention." Nephrology following fo rhypokalemia, hyponatremia and hypocalcemia.    Source: Pt, electronic medical record    Diet: consistent carbohydrate, DASH, 1200 mL fluid restriction    Patient reports appetite and intake have remained stable.  Per flowsheets, pt consistently consuming >75% of meals (01/05-01/14/19).  Pt denies nausea/vomiting.  Per Pt, no concerns with bowel movements (last BM 1/12 per chart).  Per Pt report, occasional abdominal cramping 2/2 potassium chloride tablets, but this does not appear to impact intake.  Pt notes she should be going home today.  RD offered review of CHF dietary restrictions; Pt confirms she has handout from previous RD visit and politely declined review at this time.      Review of labs indicate elevated Phos and POCT blood glucose.  Per chart, adjustments made to Lantus and premeal insulin to address elevated blood glucose.  Noted Phos high despite PhosLo.  Consider no concentrated phosphorus diet restriction - RD discussed with NP.  RD provided Pt with list of high phosphorus foods for reference, advising Pt of foods that may be limited if she is instructed to monitor phos intake.    Daily Weights:  01/14   75 kg  01/13   75.7 kg  01/12   76.1 kg  01/10   76.6 kg  Noted trend down in weight (intentional, as Pt with diuresis --- aldactone and Bumex).    Pertinent Medications: MEDICATIONS  (STANDING):  acetaminophen  IVPB .. 1000 milliGRAM(s) IV Intermittent once  aspirin enteric coated 81 milliGRAM(s) Oral daily  buMETAnide 4 milliGRAM(s) Oral two times a day  calcitriol   Capsule 0.25 MICROGram(s) Oral daily  calcium acetate 667 milliGRAM(s) Oral three times a day with meals  calcium carbonate 1250 mG  + Vitamin D (OsCal 500 + D) 1 Tablet(s) Oral two times a day  chlorhexidine 4% Liquid 1 Application(s) Topical <User Schedule>  dextrose 5%. 1000 milliLiter(s) (50 mL/Hr) IV Continuous <Continuous>  dextrose 50% Injectable 25 Gram(s) IV Push once  gabapentin 200 milliGRAM(s) Oral three times a day  gabapentin 100 milliGRAM(s) Oral at bedtime  hydrALAZINE 10 milliGRAM(s) Oral every 8 hours  insulin glargine Injectable (LANTUS) 15 Unit(s) SubCutaneous at bedtime  insulin lispro (HumaLOG) corrective regimen sliding scale   SubCutaneous three times a day before meals  insulin lispro (HumaLOG) corrective regimen sliding scale   SubCutaneous at bedtime  insulin lispro Injectable (HumaLOG) 6 Unit(s) SubCutaneous before breakfast  insulin lispro Injectable (HumaLOG) 6 Unit(s) SubCutaneous before lunch  insulin lispro Injectable (HumaLOG) 6 Unit(s) SubCutaneous before dinner  levothyroxine 175 MICROGram(s) Oral daily  magnesium oxide 400 milliGRAM(s) Oral two times a day with meals  metolazone 2.5 milliGRAM(s) Oral <User Schedule>  metoprolol succinate ER 12.5 milliGRAM(s) Oral daily  milrinone Infusion 0.5 MICROgram(s)/kG/Min (11.415 mL/Hr) IV Continuous <Continuous>  pantoprazole    Tablet 40 milliGRAM(s) Oral before breakfast  potassium chloride    Tablet ER 20 milliEquivalent(s) Oral daily  potassium chloride    Tablet ER 40 milliEquivalent(s) Oral every 4 hours  rifaximin 550 milliGRAM(s) Oral two times a day  spironolactone 50 milliGRAM(s) Oral two times a day    MEDICATIONS  (PRN):  acetaminophen   Tablet .. 650 milliGRAM(s) Oral every 6 hours PRN Mild Pain (1 - 3)  dextrose 40% Gel 15 Gram(s) Oral once PRN Blood Glucose LESS THAN 70 milliGRAM(s)/deciliter  glucagon  Injectable 1 milliGRAM(s) IntraMuscular once PRN Glucose LESS THAN 70 milligrams/deciliter  guaiFENesin   Syrup  (Sugar-Free) 200 milliGRAM(s) Oral every 6 hours PRN Cough    Pertinent Labs: 01-14 @ 13:30: Na 118<LL>, BUN 86<H>, Cr 1.26, <H>, K+ 3.7  01-14 @ 06:34: Na 122<L>, BUN 87<H>, Cr 1.23, <H>, K+ 3.0<L>, Phos 6.2<H>, Mg 2.2    Finger Sticks:  POCT Blood Glucose.: 267 mg/dL (01-14 @ 11:22)  POCT Blood Glucose.: 192 mg/dL (01-14 @ 07:38)  POCT Blood Glucose.: 293 mg/dL (01-13 @ 21:27)  POCT Blood Glucose.: 185 mg/dL (01-13 @ 17:05)      Skin per nursing documentation: no pressure injuries noted  Edema: 1+ l leg, R leg    Estimated Needs:   [ x] no change since previous assessment  [ ] recalculated:     Previous Nutrition Diagnosis: unintended weight gain  Nutrition Diagnosis is: addressed with diuresis, low Na diet restriction    New Nutrition Diagnosis: n/a     Interventions:     Recommend  1) Continue consistent carbohydrate, DASH diet restrictions.  Defer fluid restriction to medical team.  2) If elevated phos continues, consider no concentrated phos diet restriction (RD discussed with NP); RD provided handout of phos content of foods from nutrition care manual.    Monitoring and Evaluation:     Continue to monitor Nutritional intake, Tolerance to diet prescription, weights, labs, skin integrity    RD remains available upon request and will follow up per protocol    Amanda Mccord MS, RD Pager# [phone]

## 2019-01-14 NOTE — PROGRESS NOTE ADULT - ASSESSMENT
63F w/ PMHx of Hfref (mod-severe MR, EF 10% 11/2018) s/p ICD  s/p  PICC  on milrinone ,h/o  thyroid CA s/p resection c/b hypoparathyroidism, HTN, DMII, p/w worsening fluid retention      Problem/Plan - 1:  ·  Problem: Acute on Chronic Systolic CHF exacerbation.  Plan: Clinically improving.   -on Milrinone infusion and  Bumex PO now  .  - Spironolactone, hydralazine and Metolazone   - Heart failure and Cardiology helping.   - strict and outs   - daily weight    Problem/Plan - 2:  ·  Problem: DM (diabetes mellitus).  Plan: -Sugars high so increased Lantus as well Pre meal Insulin.   -Holding  oral hypoglycemics  -Start HISS, check fsg qac/qhs  -consistent carb diet.      Problem/Plan - 3:  ·  Problem: HTN (hypertension).  Plan: Well controlled.      Problem/Plan - 4:  ·  Problem: Hypothyroidism   Plan: - continue levothyroxine   - calcitriol.      Problem/Plan - 5:  ·  Problem: Hypocalcemia & Hyponatremia .  Plan: - Renal helping. NA level is low and pt refusing to take [place] .  -calcium acetate   - calcium + D.      Problem/Plan - 6:  Problem: Anemia. Plan: HH stable. Work up in progress.    no active bleeding   - monitor CBC.     Problem/Plan - 7:  ·  Problem: Elevated liver function tests.  Plan:  2/2 congestive hepatopathy . LFT trending down.   - continue rifaximin.      Problem/Plan - 8:  ·  Problem: Diabetic neuropathy with Foot Pain .  Plan: - Continue Gabapentin and added extra dose at Bed time. . Podiatry consult noted.      Problem/Plan - 9:  ·  Problem: Moderate to Severe MR.  Plan: - May need Rpt TTE per cardiology.      Problem/Plan - 10:  ·  Problem: Need for prophylactic measure.  Plan: - sub q heparin.    Disposition : Patient adamant in going home today . Explained to patient that electrolytes are very abnormal so need to have them corrected before discharge.

## 2019-01-14 NOTE — PROVIDER CONTACT NOTE (OTHER) - RECOMMENDATIONS
Educate patient regarding medication, will reschedule when pt is more amenable.
Review lab results
Notify NP
PK made aware.
Will continue to monitor.
replenish mag, will con't to monitor.

## 2019-01-14 NOTE — PROGRESS NOTE ADULT - SUBJECTIVE AND OBJECTIVE BOX
EP ATTENDING    no palpitations, no syncope, no dyspnea    acetaminophen   Tablet .. 650 milliGRAM(s) Oral every 6 hours PRN  acetaminophen  IVPB .. 1000 milliGRAM(s) IV Intermittent once  aspirin enteric coated 81 milliGRAM(s) Oral daily  buMETAnide 4 milliGRAM(s) Oral two times a day  calcitriol   Capsule 0.25 MICROGram(s) Oral daily  calcium acetate 667 milliGRAM(s) Oral three times a day with meals  calcium carbonate 1250 mG  + Vitamin D (OsCal 500 + D) 1 Tablet(s) Oral two times a day  chlorhexidine 4% Liquid 1 Application(s) Topical <User Schedule>  dextrose 40% Gel 15 Gram(s) Oral once PRN  dextrose 5%. 1000 milliLiter(s) IV Continuous <Continuous>  dextrose 50% Injectable 25 Gram(s) IV Push once  gabapentin 200 milliGRAM(s) Oral three times a day  gabapentin 100 milliGRAM(s) Oral at bedtime  glucagon  Injectable 1 milliGRAM(s) IntraMuscular once PRN  guaiFENesin   Syrup  (Sugar-Free) 200 milliGRAM(s) Oral every 6 hours PRN  hydrALAZINE 10 milliGRAM(s) Oral every 8 hours  insulin glargine Injectable (LANTUS) 15 Unit(s) SubCutaneous at bedtime  insulin lispro (HumaLOG) corrective regimen sliding scale   SubCutaneous three times a day before meals  insulin lispro (HumaLOG) corrective regimen sliding scale   SubCutaneous at bedtime  insulin lispro Injectable (HumaLOG) 6 Unit(s) SubCutaneous before breakfast  insulin lispro Injectable (HumaLOG) 6 Unit(s) SubCutaneous before lunch  insulin lispro Injectable (HumaLOG) 6 Unit(s) SubCutaneous before dinner  levothyroxine 175 MICROGram(s) Oral daily  magnesium oxide 400 milliGRAM(s) Oral two times a day with meals  metolazone 2.5 milliGRAM(s) Oral <User Schedule>  metoprolol succinate ER 12.5 milliGRAM(s) Oral daily  milrinone Infusion 0.5 MICROgram(s)/kG/Min IV Continuous <Continuous>  pantoprazole    Tablet 40 milliGRAM(s) Oral before breakfast  potassium chloride    Tablet ER 20 milliEquivalent(s) Oral daily  potassium chloride    Tablet ER 40 milliEquivalent(s) Oral every 2 hours  rifaximin 550 milliGRAM(s) Oral two times a day  spironolactone 50 milliGRAM(s) Oral two times a day  tolvaptan 15 milliGRAM(s) Oral once                            12.6   7.08  )-----------( 325      ( 13 Jan 2019 08:29 )             37.0       01-14    122<L>  |  74<L>  |  87<H>  ----------------------------<  194<H>  3.0<L>   |  31  |  1.23    Ca    9.5      14 Jan 2019 06:34  Phos  6.2     01-14  Mg     2.2     01-14      T(C): 36.8 (01-14-19 @ 04:34), Max: 36.8 (01-14-19 @ 04:34)  HR: 106 (01-14-19 @ 06:09) (100 - 110)  BP: 134/88 (01-14-19 @ 06:09) (93/64 - 134/88)  RR: 18 (01-14-19 @ 04:34) (17 - 18)  SpO2: 96% (01-14-19 @ 04:34) (96% - 99%)  Wt(kg): --    JVP 4  RRR, no murmurs  CTAB  soft nt/nd  no c/c/e     A/P) She is a pleasant 64 y/o female PMH severe NICM (LVEF 10-15%) who had a [employer] BiV-ICD implanted at [place]. A LHC at [place] was also unremarkable. She now returns for with a severe decompensation of her NICM (JVP > 12, LE edema and a near 14 lb weight gain). Her device interrogation recently demonstrates excellent RA, RV and LV lead functions, but with frequent episodes of NSVT. She denies palpitations nor high voltage therapy. EP is now called because she is persistently tachycardic. Review of her EKGs and tele show the rhythm is sinus with adequate BiV-tracking (RBBB in V1, negative in I/L). She hasn't had any more significant NSVT despite milrinone    -continue toprol, milrinone and bumex  -repeat TTE now that she's euvolemic to assess severity of TR (this can be done as outpatient if needed)  -a future option includes turning off BiV pacing to see if she responds better  -f/u with [facility] after discharge on Tue March 12th at 9:45am  -d/c planning as per primary team  -f/u final CHF recommendations

## 2019-01-14 NOTE — PROGRESS NOTE ADULT - PROBLEM SELECTOR PLAN 1
- Stop metolazone and hold PM dose of Bumex  - Continue current dose of milrinone at 0.5 mcg/kg/min  - Continue hydralazine 10 mg TID  - Continue spironolactone 50 mg BID  - Continue Toprol XL 12.5 mg daily.  - Repeat BMP at least 2 hours after she receives dose of tovaptam. Would also check VBG, lactate, and hepatic panel.  - Discussed option of advanced therapies and patient has poor insight into the prognosis of her condition and is not interested at this time. Would recommend palliative care to discuss GOC with the patient and family

## 2019-01-14 NOTE — PROGRESS NOTE ADULT - REASON FOR ADMISSION
worsened heart failure

## 2019-01-14 NOTE — PROGRESS NOTE ADULT - ATTENDING COMMENTS
Agree with above.   -continue with medical therapy of NICM per heart failure  -no further interventional workup planned at this time    Kunal Muller MD

## 2019-01-14 NOTE — PROGRESS NOTE ADULT - ATTENDING COMMENTS
Briefly, 62 year old F h/o HTN, HLD, DM II, thyroid cancer s/p thyroidectomy c/b hypocalcemia, HFrEF (LVEF 10%, LVIDd 6.6 cm; first diagnosed in 2013, now inotrope dependent), s/p CRT-D placement in June 2017 with multiple recurrent admissions for cardiogenic shock requiring inotropes who presented to ER on 1/1 with acute on chronic decompensated heart failure. Was diuresed aggressivley with improvement. Also noted to have hyponatremia in setting of metolazone. On exam, JVD approx 6-8cm, RRR, grade II/VI systolic murmur, S3, CTAB, nontender abdomen, trace edema, asterixis. Labs reviewed - K 3.7, BUN/Cr 81/1.26 (uptrending BUN), prior TBili 4.3 (downtrending), Ca 8.6. TTE reviewed - EF 10%, LVEDD 6.6 cm, mod MR, severe TR (malcoaptation). Overall stage D HF, NYHA class IV with plan for home inotropes via PICC as patient adamant about not being evaluated for LVAD currently, however have concerns about her capacity to make decisions  - give tolvaptan today for hyponatremia; hold bumex for now   - hypokalemia; on marcel to 50 mg bid   - standing weights daily  - continue milrinone to 0.5; check VBG from PICC line   - would benefit from repeat RHC  - prognosis guarded which was discussed with pt and her  but pt refusing to listen

## 2019-01-14 NOTE — PROVIDER CONTACT NOTE (CRITICAL VALUE NOTIFICATION) - ASSESSMENT
Pt K >9 per lab severely hemolyzed, VSS, a&ox3, no signs of distress
Pt asymptomatic   VSS
Pt asymptomatic  VSS
VSS. Pt denies pain or SOB. Pt resting in bed at this time, no signs of distress.

## 2019-01-14 NOTE — PROGRESS NOTE ADULT - SUBJECTIVE AND OBJECTIVE BOX
INTERVAL HPI/OVERNIGHT EVENTS: I want to go home .   Vital Signs Last 24 Hrs  T(C): 36.6 (14 Jan 2019 13:46), Max: 36.8 (14 Jan 2019 04:34)  T(F): 97.8 (14 Jan 2019 13:46), Max: 98.3 (14 Jan 2019 04:34)  HR: 105 (14 Jan 2019 13:46) (100 - 110)  BP: 115/73 (14 Jan 2019 13:46) (93/64 - 134/88)  BP(mean): --  RR: 17 (14 Jan 2019 13:46) (17 - 18)  SpO2: 99% (14 Jan 2019 13:46) (96% - 99%)  I&O's Summary    13 Jan 2019 07:01  -  14 Jan 2019 07:00  --------------------------------------------------------  IN: 1076.8 mL / OUT: 2500 mL / NET: -1423.2 mL    14 Jan 2019 07:01  -  14 Jan 2019 14:43  --------------------------------------------------------  IN: 720 mL / OUT: 750 mL / NET: -30 mL      MEDICATIONS  (STANDING):  acetaminophen  IVPB .. 1000 milliGRAM(s) IV Intermittent once  aspirin enteric coated 81 milliGRAM(s) Oral daily  buMETAnide 4 milliGRAM(s) Oral two times a day  calcitriol   Capsule 0.25 MICROGram(s) Oral daily  calcium acetate 667 milliGRAM(s) Oral three times a day with meals  calcium carbonate 1250 mG  + Vitamin D (OsCal 500 + D) 1 Tablet(s) Oral two times a day  chlorhexidine 4% Liquid 1 Application(s) Topical <User Schedule>  dextrose 5%. 1000 milliLiter(s) (50 mL/Hr) IV Continuous <Continuous>  dextrose 50% Injectable 25 Gram(s) IV Push once  gabapentin 200 milliGRAM(s) Oral three times a day  gabapentin 100 milliGRAM(s) Oral at bedtime  hydrALAZINE 10 milliGRAM(s) Oral every 8 hours  insulin glargine Injectable (LANTUS) 15 Unit(s) SubCutaneous at bedtime  insulin lispro (HumaLOG) corrective regimen sliding scale   SubCutaneous three times a day before meals  insulin lispro (HumaLOG) corrective regimen sliding scale   SubCutaneous at bedtime  insulin lispro Injectable (HumaLOG) 6 Unit(s) SubCutaneous before breakfast  insulin lispro Injectable (HumaLOG) 6 Unit(s) SubCutaneous before lunch  insulin lispro Injectable (HumaLOG) 6 Unit(s) SubCutaneous before dinner  levothyroxine 175 MICROGram(s) Oral daily  magnesium oxide 400 milliGRAM(s) Oral two times a day with meals  metolazone 2.5 milliGRAM(s) Oral <User Schedule>  metoprolol succinate ER 12.5 milliGRAM(s) Oral daily  milrinone Infusion 0.5 MICROgram(s)/kG/Min (11.415 mL/Hr) IV Continuous <Continuous>  pantoprazole    Tablet 40 milliGRAM(s) Oral before breakfast  potassium chloride    Tablet ER 20 milliEquivalent(s) Oral daily  potassium chloride    Tablet ER 40 milliEquivalent(s) Oral every 4 hours  rifaximin 550 milliGRAM(s) Oral two times a day  spironolactone 50 milliGRAM(s) Oral two times a day  tolvaptan 15 milliGRAM(s) Oral once    MEDICATIONS  (PRN):  acetaminophen   Tablet .. 650 milliGRAM(s) Oral every 6 hours PRN Mild Pain (1 - 3)  dextrose 40% Gel 15 Gram(s) Oral once PRN Blood Glucose LESS THAN 70 milliGRAM(s)/deciliter  glucagon  Injectable 1 milliGRAM(s) IntraMuscular once PRN Glucose LESS THAN 70 milligrams/deciliter  guaiFENesin   Syrup  (Sugar-Free) 200 milliGRAM(s) Oral every 6 hours PRN Cough    LABS:                        12.7   7.63  )-----------( 336      ( 14 Jan 2019 08:10 )             37.2     01-14    118<LL>  |  73<L>  |  86<H>  ----------------------------<  280<H>  3.7   |  28  |  1.26    Ca    9.4      14 Jan 2019 13:30  Phos  6.2     01-14  Mg     2.3     01-14          CAPILLARY BLOOD GLUCOSE      POCT Blood Glucose.: 267 mg/dL (14 Jan 2019 11:22)  POCT Blood Glucose.: 192 mg/dL (14 Jan 2019 07:38)  POCT Blood Glucose.: 293 mg/dL (13 Jan 2019 21:27)  POCT Blood Glucose.: 185 mg/dL (13 Jan 2019 17:05)          REVIEW OF SYSTEMS:  CONSTITUTIONAL: No fever, weight loss, or fatigue  EYES: No eye pain, visual disturbances, or discharge  ENMT:  No difficulty hearing, tinnitus, vertigo; No sinus or throat pain  NECK: No pain or stiffness  RESPIRATORY: No cough, wheezing, chills or hemoptysis; No shortness of breath  CARDIOVASCULAR: No chest pain, palpitations, dizziness, or leg swelling  GASTROINTESTINAL: No abdominal or epigastric pain. No nausea, vomiting, or hematemesis; No diarrhea or constipation. No melena or hematochezia.  GENITOURINARY: No dysuria, frequency, hematuria, or incontinence  NEUROLOGICAL: No headaches, memory loss, loss of strength, numbness, or tremors    Consultant(s) Notes Reviewed:  [x ] YES  [ ] NO    PHYSICAL EXAM:  GENERAL: NAD, well-groomed, well-developed ,not in any distress ,  HEAD:  Atraumatic, Normocephalic  EYES: EOMI, PERRLA, conjunctiva and sclera clear  NECK: Supple, No JVD, Normal thyroid  NERVOUS SYSTEM:  Alert & Oriented X3, No focal deficit   CHEST/LUNG: Good air entry bilateral with no  rales, rhonchi, wheezing, or rubs  HEART: Regular rate and rhythm; No murmurs, rubs, or gallops  ABDOMEN: Soft, Nontender, Nondistended; Bowel sounds present  EXTREMITIES:  2+ Peripheral Pulses, No clubbing, cyanosis, or edema    Care Discussed with Consultants/Other Providers [ x] YES  [ ] NO

## 2019-01-14 NOTE — PROVIDER CONTACT NOTE (OTHER) - BACKGROUND
Dx heart failure, Liver disease, Anemia, Thyroid CA
Dx heart failure, Liver disease, Anemia, Thyroid CA
Patient admitted with heart failure.
Pt admitted for CHF exac
Pt admitted for HF. Pt is on bumex gtt & primacor gtt
Pt admitted for Heart failure
Pt admitted for heart failure. Pt has a hx of 5 beats WCT. Pt is on bumex & primacor gtts

## 2019-01-14 NOTE — PROGRESS NOTE ADULT - SUBJECTIVE AND OBJECTIVE BOX
Subjective:  - No acute events overnight  - She has been ambulating around the nursing unit with PT and denies any CP, palpitations, lightheadedness, dizziness, dyspnea, orthopnea, or PND    Medications:  acetaminophen   Tablet .. 650 milliGRAM(s) Oral every 6 hours PRN  acetaminophen  IVPB .. 1000 milliGRAM(s) IV Intermittent once  aspirin enteric coated 81 milliGRAM(s) Oral daily  buMETAnide 4 milliGRAM(s) Oral two times a day  calcitriol   Capsule 0.25 MICROGram(s) Oral daily  calcium acetate 667 milliGRAM(s) Oral three times a day with meals  calcium carbonate 1250 mG  + Vitamin D (OsCal 500 + D) 1 Tablet(s) Oral two times a day  chlorhexidine 4% Liquid 1 Application(s) Topical <User Schedule>  dextrose 40% Gel 15 Gram(s) Oral once PRN  dextrose 5%. 1000 milliLiter(s) IV Continuous <Continuous>  dextrose 50% Injectable 25 Gram(s) IV Push once  gabapentin 200 milliGRAM(s) Oral three times a day  gabapentin 100 milliGRAM(s) Oral at bedtime  glucagon  Injectable 1 milliGRAM(s) IntraMuscular once PRN  guaiFENesin   Syrup  (Sugar-Free) 200 milliGRAM(s) Oral every 6 hours PRN  hydrALAZINE 10 milliGRAM(s) Oral every 8 hours  insulin glargine Injectable (LANTUS) 15 Unit(s) SubCutaneous at bedtime  insulin lispro (HumaLOG) corrective regimen sliding scale   SubCutaneous three times a day before meals  insulin lispro (HumaLOG) corrective regimen sliding scale   SubCutaneous at bedtime  insulin lispro Injectable (HumaLOG) 6 Unit(s) SubCutaneous before breakfast  insulin lispro Injectable (HumaLOG) 6 Unit(s) SubCutaneous before lunch  insulin lispro Injectable (HumaLOG) 6 Unit(s) SubCutaneous before dinner  levothyroxine 175 MICROGram(s) Oral daily  magnesium oxide 400 milliGRAM(s) Oral two times a day with meals  metoprolol succinate ER 12.5 milliGRAM(s) Oral daily  milrinone Infusion 0.5 MICROgram(s)/kG/Min IV Continuous <Continuous>  pantoprazole    Tablet 40 milliGRAM(s) Oral before breakfast  potassium chloride    Tablet ER 20 milliEquivalent(s) Oral daily  potassium chloride    Tablet ER 40 milliEquivalent(s) Oral every 4 hours  rifaximin 550 milliGRAM(s) Oral two times a day  spironolactone 50 milliGRAM(s) Oral two times a day      Physical Exam:    Vitals:  Vital Signs Last 24 Hours  T(C): 36.6 (19 @ 13:46), Max: 36.8 (19 @ 04:34)  HR: 105 (19 @ 13:46) (100 - 110)  BP: 115/73 (19 @ 13:46) (93/64 - 134/88)  RR: 17 (19 @ 13:46) (17 - 18)  SpO2: 99% (19 @ 13:46) (96% - 99%)    Weight in k ( @ 09:50)    I&O's Summary    2019 07:  -  2019 07:00  --------------------------------------------------------  IN: 1076.8 mL / OUT: 2500 mL / NET: -1423.2 mL    2019 07:  -  2019 18:01  --------------------------------------------------------  IN: 720 mL / OUT: 750 mL / NET: -30 mL        Tele: SR/ST  with occasional     General: No distress. Comfortable.  HEENT: EOM intact.  Neck: Neck supple. JVP 8 cm H2O, no HJR. No masses  Chest: Clear to auscultation bilaterally  CV: RRR. Normal S1 and S2. No murmurs, rub, or gallops. Radial pulses normal. No edema.  Abdomen: Soft, non-distended, non-tender  Skin: No rashes or skin breakdown  Neurology: Alert and oriented times three. Sensation intact  Psych: Affect normal    Labs:                        12.7   7.63  )-----------( 336      ( 2019 08:10 )             37.2         118<LL>  |  73<L>  |  86<H>  ----------------------------<  280<H>  3.7   |  28  |  1.26    Ca    9.4      2019 13:30  Phos  6.2       Mg     2.3

## 2019-01-14 NOTE — PROVIDER CONTACT NOTE (OTHER) - DATE AND TIME:
04-Jan-2019 10:05
06-Jan-2019 08:55
06-Jan-2019 20:00
07-Jan-2019 02:09
09-Jan-2019 00:47
14-Jan-2019 00:15
04-Jan-2019 06:56

## 2019-01-14 NOTE — PROVIDER CONTACT NOTE (OTHER) - REASON
7 bts WTC
Lab results
PT had 5 beats WCT first time
Pt c/o R leg contractions
WCT
pt had 1.8sec PAT, HR 180s
pt refusing AM Xifaxin

## 2019-01-14 NOTE — PROVIDER CONTACT NOTE (OTHER) - ASSESSMENT
VSS. AO4.
Pt asymptomatic /74 HR 96, no complains of CP/SOB, on a bumex gtts /primacor gtts
Pt is asymptomatic. RF=047/72. MB=466.
VSS, AO4, no complaints of SOB, chest pain, dizziness, or palpitations; no s/s of distress.
VSS. AOx4. asymptomatic, pt resting ini bed
VSS. BP: 110/71 HR: 76 O2:96% on RA  Patient resting comfortably

## 2019-01-14 NOTE — PROGRESS NOTE ADULT - ATTENDING COMMENTS
Patient seen and examined, agree with above assessment and plan as transcribed above.    - D/C planning pending K+    Augusto Grimes MD, FACC

## 2019-01-14 NOTE — PROGRESS NOTE ADULT - ASSESSMENT
63 year old  woman with NICM (EF 10%, LVIDD 6.1cm, dx 2013, ACC/AHA stage D, class IV HF, milrinone dependent) s/p CRT-D (6/2017), Mod-Severe MR, Severe TR, HTN, HLD, DM2, and Thyroid Cancer s/p resection c/b hypoparathyroidism with chronic hypocalcemia who presents for acute on chronic systolic heart failure. She is ACC/AHA Stage D and NYHA Class IV. She currently appears euvolemic on exam and normotensive. Renal function stable, but hyponatremia worsening and hypokalemic this AM.

## 2019-01-14 NOTE — PROGRESS NOTE ADULT - ASSESSMENT
ASSESSMENT/RECOMMEND  A 63-year-old female with past medical history of hypertension, diabetes, chronic kidney disease stage III, presents with grossly volume overloaded state.  She has hypervolemic, hyponatremia and sp  aggressive diuresis.  Hypoparathyroidism  Hypokalemia and hyponatremia    Cardiology.  On Bumex PO now with good urine output; on primacor gtt.   Renal.  Serum sodium stable is low and a combination of heart failure and water intake;Samsca 15mg po x 1.  Avoid excessive amounts of liquids without calories.  Encourage protein intake. On KCl 40 meq PO x 3 doses and then stat repeat.  She will need more potassium later today as well   Endo.  Continue with Synthroid.  Once she is out of heart failure, the dose of Synthroid may need to be adjusted.   Check TSH in one to two weeks  Calcium is improving as well.  Reduce calcium bid yesterday and reduce calcitrol to 0.25mcg po qd.        [facility]  [phone]

## 2019-01-14 NOTE — PROVIDER CONTACT NOTE (OTHER) - SITUATION
pt refusing AM Xifaxin
7 bts WTC
Notified provider-pt had 4 beats of WCT w/ 
Notified provider-q 12 BMP & Mg resulted
PT had 5 beats WCT first time
Pt c/o R leg contractions
pt had 1.8sec PAT, HR 180s

## 2019-01-14 NOTE — PROVIDER CONTACT NOTE (OTHER) - ACTION/TREATMENT ORDERED:
Pt was educated regarding importance of medication and elevated lab results. will reschedule and re-educate when pt is more amenable.
NP aware-continue to monitor pt
NP aware-no need for supplementation
1 Gram mg sulfate ivpb, will con't to monitor.
BMP, Mg, and Phos ordered for AM bloodwork
Continue to monitor.
NP notified, no intervention at this time, will continue to monitor

## 2019-01-14 NOTE — PROGRESS NOTE ADULT - SUBJECTIVE AND OBJECTIVE BOX
Subjective:  pt seen and examined, no complaints    Tele : NSR/ ST    acetaminophen   Tablet .. 650 milliGRAM(s) Oral every 6 hours PRN  acetaminophen  IVPB .. 1000 milliGRAM(s) IV Intermittent once  aspirin enteric coated 81 milliGRAM(s) Oral daily  buMETAnide 4 milliGRAM(s) Oral two times a day  calcitriol   Capsule 0.25 MICROGram(s) Oral daily  calcium acetate 667 milliGRAM(s) Oral three times a day with meals  calcium carbonate 1250 mG  + Vitamin D (OsCal 500 + D) 1 Tablet(s) Oral two times a day  chlorhexidine 4% Liquid 1 Application(s) Topical <User Schedule>  dextrose 40% Gel 15 Gram(s) Oral once PRN  dextrose 5%. 1000 milliLiter(s) IV Continuous <Continuous>  dextrose 50% Injectable 25 Gram(s) IV Push once  gabapentin 200 milliGRAM(s) Oral three times a day  gabapentin 100 milliGRAM(s) Oral at bedtime  glucagon  Injectable 1 milliGRAM(s) IntraMuscular once PRN  guaiFENesin   Syrup  (Sugar-Free) 200 milliGRAM(s) Oral every 6 hours PRN  hydrALAZINE 10 milliGRAM(s) Oral every 8 hours  insulin glargine Injectable (LANTUS) 15 Unit(s) SubCutaneous at bedtime  insulin lispro (HumaLOG) corrective regimen sliding scale   SubCutaneous three times a day before meals  insulin lispro (HumaLOG) corrective regimen sliding scale   SubCutaneous at bedtime  insulin lispro Injectable (HumaLOG) 6 Unit(s) SubCutaneous before breakfast  insulin lispro Injectable (HumaLOG) 6 Unit(s) SubCutaneous before lunch  insulin lispro Injectable (HumaLOG) 6 Unit(s) SubCutaneous before dinner  levothyroxine 175 MICROGram(s) Oral daily  magnesium oxide 400 milliGRAM(s) Oral two times a day with meals  metolazone 2.5 milliGRAM(s) Oral <User Schedule>  metoprolol succinate ER 12.5 milliGRAM(s) Oral daily  milrinone Infusion 0.5 MICROgram(s)/kG/Min IV Continuous <Continuous>  pantoprazole    Tablet 40 milliGRAM(s) Oral before breakfast  potassium chloride    Tablet ER 20 milliEquivalent(s) Oral daily  rifaximin 550 milliGRAM(s) Oral two times a day  spironolactone 50 milliGRAM(s) Oral two times a day                            12.6   7.08  )-----------( 325      ( 13 Jan 2019 08:29 )             37.0       Hemoglobin: 12.6 g/dL (01-13 @ 08:29)  Hemoglobin: 13.1 g/dL (01-12 @ 06:41)  Hemoglobin: 12.4 g/dL (01-10 @ 07:22)  Hemoglobin: 11.1 g/dL (01-09 @ 08:06)      01-12    125<L>  |  76<L>  |  74<H>  ----------------------------<  117<H>  3.3<L>   |  29  |  1.20    Ca    9.5      12 Jan 2019 06:41  Mg     2.2     01-12      Creatinine Trend: 1.20<--, 1.30<--, 1.08<--, 1.12<--, 1.24<--, 1.16<--    COAGS:           T(C): 36.8 (01-14-19 @ 04:34), Max: 36.8 (01-14-19 @ 04:34)  HR: 110 (01-14-19 @ 04:34) (100 - 110)  BP: 93/64 (01-14-19 @ 04:34) (93/64 - 108/70)  RR: 18 (01-14-19 @ 04:34) (17 - 18)  SpO2: 96% (01-14-19 @ 04:34) (96% - 99%)  Wt(kg): --    I&O's Summary    12 Jan 2019 07:01  -  13 Jan 2019 07:00  --------------------------------------------------------  IN: 1060 mL / OUT: 2100 mL / NET: -1040 mL    13 Jan 2019 07:01  -  14 Jan 2019 04:52  --------------------------------------------------------  IN: 1076.8 mL / OUT: 2500 mL / NET: -1423.2 mL            HEENT:  (-)icterus (-)pallor  CV: N S1 S2 1/6 ROSALINDA (+)2 Pulses B/l  Resp:  Clear to ausculatation B/L, normal effort  GI: (+) BS Soft, NT, ND  Lymph:  (-)Edema, (-)obvious lymphadenopathy  Skin: Warm to touch, Normal turgor  Psych: Appropriate mood and affect        ECG:    A sense V paced	    RADIOLOGY:         CXR:  No infiltartes     ASSESSMENT/PLAN: 	63y Female  F with PMHx of NICM s/p AICD, HTN, moderate-severe MR, reportedly recent LHC at [place] was negative, thyroid cancer s/p thyroidectomy, DM,  admitted with decompensated acute on chronic systolic heart failure.    - ASA,  statin   - tele stable   - Bumex PO  / Primacor gtt / Toprol   - heart failure follow up - Set up for d/c with primacor gtt at home   - cont hydralizine , aldactone BP stable   -  GI / DVT prophylaxis,  keep K>4, mag >2.0   D/C home today with pump for Primacor   D/W Dr Hallman

## 2019-01-14 NOTE — PROVIDER CONTACT NOTE (CRITICAL VALUE NOTIFICATION) - ACTION/TREATMENT ORDERED:
Potassium repleted  Pt awaiting repeat labs  Please see EMAR for further details  Will cont to monitor
RICHARD Hidalgo aware, PA to order repeat labs, pending phlebotomy Pt hard stick, cont to monitor
SAMSCA ordered and given  Please see EMAR for further details  Will cont to monitor
NP ordered 1 gram Calcium Gluconate IVPB, 1 Gram Magnesium Sulfate IVPB, and 20 mEq of Potassium chloride. NP ordered Calcium and Magnesium AM labs as well. Will continue to monitor.

## 2019-01-14 NOTE — PROGRESS NOTE ADULT - SUBJECTIVE AND OBJECTIVE BOX
[facility] [phone]        Patient seen and examined in bed.  She felt well        MEDICATIONS  (STANDING):  acetaminophen  IVPB .. 1000 milliGRAM(s) IV Intermittent once  aspirin enteric coated 81 milliGRAM(s) Oral daily  buMETAnide 4 milliGRAM(s) Oral two times a day  calcitriol   Capsule 0.25 MICROGram(s) Oral daily  calcium acetate 667 milliGRAM(s) Oral three times a day with meals  calcium carbonate 1250 mG  + Vitamin D (OsCal 500 + D) 1 Tablet(s) Oral two times a day  chlorhexidine 4% Liquid 1 Application(s) Topical <User Schedule>  dextrose 5%. 1000 milliLiter(s) (50 mL/Hr) IV Continuous <Continuous>  dextrose 50% Injectable 25 Gram(s) IV Push once  gabapentin 200 milliGRAM(s) Oral three times a day  gabapentin 100 milliGRAM(s) Oral at bedtime  hydrALAZINE 10 milliGRAM(s) Oral every 8 hours  insulin glargine Injectable (LANTUS) 15 Unit(s) SubCutaneous at bedtime  insulin lispro (HumaLOG) corrective regimen sliding scale   SubCutaneous three times a day before meals  insulin lispro (HumaLOG) corrective regimen sliding scale   SubCutaneous at bedtime  insulin lispro Injectable (HumaLOG) 6 Unit(s) SubCutaneous before breakfast  insulin lispro Injectable (HumaLOG) 6 Unit(s) SubCutaneous before lunch  insulin lispro Injectable (HumaLOG) 6 Unit(s) SubCutaneous before dinner  levothyroxine 175 MICROGram(s) Oral daily  magnesium oxide 400 milliGRAM(s) Oral two times a day with meals  metolazone 2.5 milliGRAM(s) Oral <User Schedule>  metoprolol succinate ER 12.5 milliGRAM(s) Oral daily  milrinone Infusion 0.5 MICROgram(s)/kG/Min (11.415 mL/Hr) IV Continuous <Continuous>  pantoprazole    Tablet 40 milliGRAM(s) Oral before breakfast  potassium chloride    Tablet ER 20 milliEquivalent(s) Oral daily  potassium chloride    Tablet ER 40 milliEquivalent(s) Oral every 2 hours  rifaximin 550 milliGRAM(s) Oral two times a day  spironolactone 50 milliGRAM(s) Oral two times a day  tolvaptan 15 milliGRAM(s) Oral once      VITAL:  T(C): , Max: 36.8 (01-14-19 @ 04:34)  T(F): , Max: 98.3 (01-14-19 @ 04:34)  HR: 106 (01-14-19 @ 06:09)  BP: 134/88 (01-14-19 @ 06:09)  BP(mean): --  RR: 18 (01-14-19 @ 04:34)  SpO2: 96% (01-14-19 @ 04:34)  Wt(kg): --    I and O's:    01-13 @ 07:01  -  01-14 @ 07:00  --------------------------------------------------------  IN: 1076.8 mL / OUT: 2500 mL / NET: -1423.2 mL          PHYSICAL EXAM:    Constitutional: NAD  Neck:  No JVD  Respiratory: CTAB/L  Cardiovascular: S1 and S2  Gastrointestinal: BS+, soft, NT/ND  Extremities: No peripheral edema  Neurological: A/O x 3, no focal deficits  Psychiatric: Normal mood, normal affect  : No Gustafson  Skin: No rashes  Access: Not applicable    LABS:                        12.6   7.08  )-----------( 325      ( 13 Jan 2019 08:29 )             37.0     01-14    122<L>  |  74<L>  |  87<H>  ----------------------------<  194<H>  3.0<L>   |  31  |  1.23    Ca    9.5      14 Jan 2019 06:34  Phos  6.2     01-14  Mg     2.2     01-14            Urine Studies:          RADIOLOGY & ADDITIONAL STUDIES:

## 2019-01-15 NOTE — CHART NOTE - NSCHARTNOTEFT_GEN_A_CORE
Was called by RN due to patient wanting to sign out AMA. Asked patient why she wanted to leave, reports it's unfair for the hospital to keep her any longer and thinks the doctors will continue to find reasons to delay her discharge. Re-explained patient's hyponatremic condition, and discussed the risks of untreated hyponatremia including possible seizures, coma, death.     Patient is AAO x 3. I explained at length to the patient the risks of signing out AMA including but not limited to harm, injury or death. I explained the risks, benefits and alternatives to treatment as well as the attendant risks of refusing treatment at this time. I offered to answer any questions and fully answered any such questions. We believe that the patient fully understands what has been explained and answered. I informed attending, Dr. Rojas of this, aware. Patient signed form to sign out AMA and accepts responsibility for any and all results of this decision.      Odalys Mac PA-C  Department of Medicine  #[id number]

## 2019-01-22 ENCOUNTER — APPOINTMENT (OUTPATIENT)
Dept: CARDIOLOGY | Facility: CLINIC | Age: 64
End: 2019-01-22
Payer: COMMERCIAL

## 2019-01-22 ENCOUNTER — LABORATORY RESULT (OUTPATIENT)
Age: 64
End: 2019-01-22

## 2019-01-22 VITALS
HEIGHT: 67 IN | WEIGHT: 175 LBS | BODY MASS INDEX: 27.47 KG/M2 | SYSTOLIC BLOOD PRESSURE: 104 MMHG | OXYGEN SATURATION: 100 % | HEART RATE: 109 BPM | DIASTOLIC BLOOD PRESSURE: 72 MMHG

## 2019-01-22 PROCEDURE — 99214 OFFICE O/P EST MOD 30 MIN: CPT

## 2019-01-23 LAB
ALBUMIN SERPL ELPH-MCNC: 3.9 G/DL
ALP BLD-CCNC: 334 U/L
ALT SERPL-CCNC: 48 U/L
ANION GAP SERPL CALC-SCNC: 14 MMOL/L
AST SERPL-CCNC: 63 U/L
BASOPHILS # BLD AUTO: 0.08 K/UL
BASOPHILS NFR BLD AUTO: 1.5 %
BILIRUB SERPL-MCNC: 1.8 MG/DL
BUN SERPL-MCNC: 24 MG/DL
CALCIUM SERPL-MCNC: 8.2 MG/DL
CHLORIDE SERPL-SCNC: 79 MMOL/L
CO2 SERPL-SCNC: 36 MMOL/L
CREAT SERPL-MCNC: 0.96 MG/DL
EOSINOPHIL # BLD AUTO: 0.12 K/UL
EOSINOPHIL NFR BLD AUTO: 2.2 %
GLUCOSE SERPL-MCNC: 227 MG/DL
HCT VFR BLD CALC: 37.6 %
HGB BLD-MCNC: 11.8 G/DL
IMM GRANULOCYTES NFR BLD AUTO: 0.4 %
LYMPHOCYTES # BLD AUTO: 1.1 K/UL
LYMPHOCYTES NFR BLD AUTO: 20 %
MAN DIFF?: NORMAL
MCHC RBC-ENTMCNC: 22.3 PG
MCHC RBC-ENTMCNC: 31.4 GM/DL
MCV RBC AUTO: 71.2 FL
MONOCYTES # BLD AUTO: 0.61 K/UL
MONOCYTES NFR BLD AUTO: 11.1 %
NEUTROPHILS # BLD AUTO: 3.56 K/UL
NEUTROPHILS NFR BLD AUTO: 64.8 %
PLATELET # BLD AUTO: 249 K/UL
POTASSIUM SERPL-SCNC: 3 MMOL/L
PROT SERPL-MCNC: 7.9 G/DL
RBC # BLD: 5.28 M/UL
RBC # FLD: 23.2 %
SODIUM SERPL-SCNC: 129 MMOL/L
WBC # FLD AUTO: 5.49 K/UL

## 2019-01-24 NOTE — DISCUSSION/SUMMARY
[Patient] : the patient [FreeTextEntry1] : Ms. Carson is a 62 y/o AA woman with dilated NICM (LVEF 9%, LVIDd 6.1 cm) s/p CRT-D, Mod-Severe MR, Severe TR, HLD, DM II, and Thyroid Cancer s/p Resection, Surgical Hypoparathyroidism with Chronic Hypocalcemia. She is AHA/ACC Stage D, NYHA class III. She appears stable today, normotensive, no overt volume overload, though weight is up. Plan discussed with Ms Carson and her .\par \par 1. Chronic systolic heart failure -\par Stressed we need to review medication list. \par Should resume metoprolol 12.5mg daily\par Continue current dose of bumex 4mg bid with melolazone 2.5mg \par Continue all other meds as prescribed, will review\par Continue milrinone at 0.5 mcg/kg/min. \par Labs today\par \par 2. RTC in one week with NP\par \par

## 2019-01-24 NOTE — REASON FOR VISIT
[Follow-Up - Clinic] : a clinic follow-up of [Cardiomyopathy] : cardiomyopathy [Heart Failure] : congestive heart failure [FreeTextEntry1] : Patient Instructions:\par \par 1. Call  or fax med list \par \par 2. Continue present meds, resume metoprolol if that is what you stopped\par \par 3. We will check your blood work today and call you with the results, especially concerning potassium level\par \par 4. Continue the MILRINONE infusion and all your other medications as prescribed. \par \par 5. Continue to monitor your weight and BP at home. Call us if you notice a weight gain of 2 pounds in 2 days or 5 pounds in a week.\par \par 6. Please follow-up with the HF NP in 1 week.

## 2019-01-24 NOTE — HISTORY OF PRESENT ILLNESS
[FreeTextEntry1] : Ms. Carson is a 64 y/o AA woman with NICM (LVEF now 9%, LVIDd 6.1 cm), initially identified in 2013, s/p CRT-D (6/2017), Mod-Severe MR, Severe TR, HTN, HLD, DM II, and Thyroid Cancer s/p Resection, Surgical Hypoparathyroidism with Chronic Hypocalcemia.\par \par She has had 5 admissions to [facility] over the past year and 1 recently to [facility] for ADHF, intermittently requiring IV inotropic support. She has not consistently followed-up with the advanced HF team resulting in suboptimal GDMT for her HF. \par \par She was then readmitted from 11/6/18-11/23/18 for ADHF. Her course was complicated by cardiogenic shock requiring IV inotropic support with dobutamine, which was then later switched to milrinone. She was diuresed on IV bumex. A PICC line was placed and she was discharged home on milrinone at 0.375 mcg/kg/min. Her discharge weight was 166.8 lbs. She was seen weekly outpatient with increasing weight gain despite doses of IV lasix and increase of milrinone to 0.5mcg.  Though recommended she refused admission.\par When symptoms became worse she went to the ER and was admitted 1/1-1/15/19 with ADHF, treated with IV diuretics, her sodium was low and she was given tolvaptan, potassium was low on day of discharge and she was sent home on KCL 40meq daily and aldactone 50mg BID, she has not been taking the potassium supplement.  Of note patient signed out AMA\par She presents today post discharge, states feeling well. She did not bring her medications and does not know what she is taking, though states she has questions about her meds.  She reports not taking the potassium supplement and the "1/2" pill. She denies any sob, orthopnea or PND, she feels she has more energy, not as fatigued and able to ambulate around the house without difficulty.  She denies any chest pain, palpitations or ICD shocks. No reported dizziness, or lightheadedness. She is eating and sleeping well, no abdominal pain or bloating. She denies fever, chills, erythema or discharge from PICC line site. Dressing was changed recently by infusion RN. \par Her discharge weight was 165lbs, today 175lbs, at home 170

## 2019-01-24 NOTE — PHYSICAL EXAM
[General Appearance - In No Acute Distress] : no acute distress [Eyelids - No Xanthelasma] : the eyelids demonstrated no xanthelasmas [No Oral Pallor] : no oral pallor [No Oral Cyanosis] : no oral cyanosis [] : no respiratory distress [Respiration, Rhythm And Depth] : normal respiratory rhythm and effort [Exaggerated Use Of Accessory Muscles For Inspiration] : no accessory muscle use [Auscultation Breath Sounds / Voice Sounds] : lungs were clear to auscultation bilaterally [Heart Rate And Rhythm] : heart rate and rhythm were normal [Heart Sounds] : normal S1 and S2 [1+] : left 1+ [Bowel Sounds] : normal bowel sounds [Abdomen Soft] : soft [Abdomen Tenderness] : non-tender [Nail Clubbing] : no clubbing of the fingernails [Cyanosis, Localized] : no localized cyanosis [Skin Color & Pigmentation] : normal skin color and pigmentation [Oriented To Time, Place, And Person] : oriented to person, place, and time [Affect] : the affect was normal [Mood] : the mood was normal [Normal Appearance] : normal appearance [General Appearance - Well Nourished] : well nourished [FreeTextEntry1] : poor insight

## 2019-01-24 NOTE — ADDENDUM
[FreeTextEntry1] : 1/23 Labs reviewed, K 3.0, janneth to home no answer and message box full\par Call to Mr. sarah chua, informed of critically low K and need for supplementation.  Take 40meq KCL BID for 3 days and resume 40meq daily.  Stressed compliance, will have repeat labs at next visit

## 2019-01-30 ENCOUNTER — APPOINTMENT (OUTPATIENT)
Dept: CARDIOLOGY | Facility: CLINIC | Age: 64
End: 2019-01-30
Payer: COMMERCIAL

## 2019-01-30 ENCOUNTER — OTHER (OUTPATIENT)
Age: 64
End: 2019-01-30

## 2019-01-30 VITALS
HEIGHT: 67 IN | DIASTOLIC BLOOD PRESSURE: 80 MMHG | WEIGHT: 177 LBS | OXYGEN SATURATION: 100 % | SYSTOLIC BLOOD PRESSURE: 117 MMHG | HEART RATE: 89 BPM | BODY MASS INDEX: 27.78 KG/M2

## 2019-01-30 PROCEDURE — 99214 OFFICE O/P EST MOD 30 MIN: CPT

## 2019-01-31 LAB
ALBUMIN SERPL ELPH-MCNC: 3.9 G/DL
ALP BLD-CCNC: 315 U/L
ALT SERPL-CCNC: 46 U/L
ANION GAP SERPL CALC-SCNC: 20 MMOL/L
AST SERPL-CCNC: 54 U/L
BILIRUB SERPL-MCNC: 2.3 MG/DL
BUN SERPL-MCNC: 23 MG/DL
CALCIUM SERPL-MCNC: 8.7 MG/DL
CHLORIDE SERPL-SCNC: 75 MMOL/L
CO2 SERPL-SCNC: 39 MMOL/L
CREAT SERPL-MCNC: 1.08 MG/DL
GLUCOSE SERPL-MCNC: 159 MG/DL
POTASSIUM SERPL-SCNC: 2.7 MMOL/L
PROT SERPL-MCNC: 8.1 G/DL
SODIUM SERPL-SCNC: 134 MMOL/L

## 2019-01-31 NOTE — HISTORY OF PRESENT ILLNESS
[FreeTextEntry1] : Ms. Carson is a 62 y/o AA woman with NICM (LVEF now 9%, LVIDd 6.1 cm), initially identified in 2013, s/p CRT-D (6/2017), Mod-Severe MR, Severe TR, HTN, HLD, DM II, and Thyroid Cancer s/p Resection, Surgical Hypoparathyroidism with Chronic Hypocalcemia.\par \par She has had 5 admissions to [facility] over the past year and  to [facility] for ADHF, intermittently requiring IV inotropic support. She has not consistently followed-up with the advanced HF team resulting in suboptimal GDMT for her HF. \par \par She was then readmitted from 11/6/18-11/23/18 for ADHF. Her course was complicated by cardiogenic shock requiring IV inotropic support with dobutamine, which was then later switched to milrinone. She was diuresed on IV bumex. A PICC line was placed and she was discharged home on milrinone at 0.375 mcg/kg/min. Her discharge weight was 166.8 lbs. She was seen weekly outpatient with increasing weight gain despite doses of IV lasix and increase of milrinone to 0.5mcg.  Though recommended she refused admission.\par When symptoms became worse she went to the ER and was admitted 1/1-1/15/19 with ADHF, treated with IV diuretics, her sodium was low and she was given tolvaptan, potassium was low on day of discharge and she was sent home on KCL 40meq daily and aldactone 50mg BID, she has not been taking the potassium supplement or metoprolol.  Of note patient signed out AMA\par Last visit 1/22 instructed to call with accurate med list or have family fax list and to resume Toprol 12.5 mg if that is also what she was not taking.  In addition K was 3.0 and instructed to resume K supplement.\par Ms. Carson did not return any calls and did not send med list. She has not been taking the K supplement consistently, has not taken metolozone, last visit resumed toprol 12.5 without complication.\par She presents today for follow up, appears fatigued, she states she feels well, she denies any sob, orthopnea or PND, she feels she has more energy, not as fatigued and able to ambulate around the house without difficulty.  She denies any chest pain, palpitations or ICD shocks. No reported dizziness, or lightheadedness. She is eating and sleeping well, no abdominal pain or bloating. She denies fever, chills, PICC line site clean and dry.\par Her discharge weight was 165lbs, today 177lbs, at home 176

## 2019-01-31 NOTE — REASON FOR VISIT
[Follow-Up - Clinic] : a clinic follow-up of [Cardiomyopathy] : cardiomyopathy [Heart Failure] : congestive heart failure [FreeTextEntry1] : Patient Instructions:\par \par 1. Must take Potassium supplement 40meq ( 2-20meq) tabs daily\par \par 2. Will add metolozone 2.5mg for 2 days\par  \par 3. Continue all other medications\par \par 4. We will check your blood work today and call you with the results, especially concerning potassium level\par \par 5. Continue the MILRINONE infusion and all your other medications as prescribed. \par \par 6. Continue to monitor your weight and BP at home. Call us if you notice a weight gain of 2 pounds in 2 days or 5 pounds in a week.\par \par 6. Please follow-up with the HF NP in 2 weeks.

## 2019-01-31 NOTE — DISCUSSION/SUMMARY
[Patient] : the patient [FreeTextEntry1] : Ms. Carson is a 62 y/o AA woman with dilated NICM (LVEF 9%, LVIDd 6.1 cm) s/p CRT-D, Mod-Severe MR, Severe TR, HLD, DM II, and Thyroid Cancer s/p Resection, Surgical Hypoparathyroidism with Chronic Hypocalcemia. She is AHA/ACC Stage D, NYHA class III. She appears stable today, normotensive, mild volume overload, weight continues to increase. Plan discussed with Ms Carson and her .\par \par 1. Chronic systolic heart failure -\par Stressed medication compliance\par Continue metoprolol 12.5mg daily\par Continue current dose of bumex 4mg bid add metolozone 2.5mg for 2 days, she will call office on 2/1\par Continue all other meds as prescribed, med list reviewed at length, daughter is filling a pill box from the list\par Continue milrinone at 0.5 mcg/kg/min, if no change in weight will adjust rate based on weight \par Labs today\par \par 2. RTC in 2 weeks with NP\par \par

## 2019-01-31 NOTE — PHYSICAL EXAM
[Normal Appearance] : normal appearance [General Appearance - Well Nourished] : well nourished [General Appearance - In No Acute Distress] : no acute distress [Eyelids - No Xanthelasma] : the eyelids demonstrated no xanthelasmas [No Oral Pallor] : no oral pallor [No Oral Cyanosis] : no oral cyanosis [] : no respiratory distress [Respiration, Rhythm And Depth] : normal respiratory rhythm and effort [Exaggerated Use Of Accessory Muscles For Inspiration] : no accessory muscle use [Auscultation Breath Sounds / Voice Sounds] : lungs were clear to auscultation bilaterally [Heart Rate And Rhythm] : heart rate and rhythm were normal [Heart Sounds] : normal S1 and S2 [1+] : left 1+ [Bowel Sounds] : normal bowel sounds [Abdomen Soft] : soft [Abdomen Tenderness] : non-tender [Nail Clubbing] : no clubbing of the fingernails [Cyanosis, Localized] : no localized cyanosis [Skin Color & Pigmentation] : normal skin color and pigmentation [Oriented To Time, Place, And Person] : oriented to person, place, and time [Affect] : the affect was normal [Mood] : the mood was normal [FreeTextEntry1] : poor insight

## 2019-01-31 NOTE — ADDENDUM
[FreeTextEntry1] : K 2.7, left message for patient and spoke to , instructed to take 40meq bid for 3 days then 40meq daily, will have repeat home lab draw next week

## 2019-02-01 ENCOUNTER — OTHER (OUTPATIENT)
Age: 64
End: 2019-02-01

## 2019-02-04 NOTE — H&P ADULT - PROBLEM SELECTOR PLAN 7
Healing well, no pain.  Return to work planned 2/26/19 with lifting restriction.  F/u PRN issues/concerns   thought to be 2/2 congestive hepatopathy   - continue rifaximin

## 2019-02-19 ENCOUNTER — APPOINTMENT (OUTPATIENT)
Dept: CARDIOLOGY | Facility: CLINIC | Age: 64
End: 2019-02-19
Payer: COMMERCIAL

## 2019-02-19 VITALS
OXYGEN SATURATION: 100 % | HEART RATE: 97 BPM | DIASTOLIC BLOOD PRESSURE: 76 MMHG | HEIGHT: 67 IN | WEIGHT: 188 LBS | BODY MASS INDEX: 29.51 KG/M2 | SYSTOLIC BLOOD PRESSURE: 111 MMHG

## 2019-02-19 DIAGNOSIS — E87.6 HYPOKALEMIA: ICD-10-CM

## 2019-02-19 PROCEDURE — 99214 OFFICE O/P EST MOD 30 MIN: CPT

## 2019-02-19 RX ORDER — CALCIUM ACETATE 667 MG
667 TABLET ORAL 3 TIMES DAILY
Refills: 0 | Status: DISCONTINUED | COMMUNITY
Start: 2018-12-05 | End: 2019-02-19

## 2019-02-19 RX ORDER — HYDRALAZINE HYDROCHLORIDE 10 MG/1
10 TABLET ORAL
Qty: 90 | Refills: 3 | Status: DISCONTINUED | COMMUNITY
Start: 2019-01-30 | End: 2019-02-19

## 2019-02-19 NOTE — REASON FOR VISIT
[Follow-Up - Clinic] : a clinic follow-up of [Cardiomyopathy] : cardiomyopathy [Heart Failure] : congestive heart failure [Spouse] : spouse [FreeTextEntry1] : Patient Instructions:\par \par 1. Please STOP taking the hydralazine.\par \par 2. Please START taking LOSARTAN 12.5mg (1/2 of 25mg tablet) ONCE a day. This will replace your hydralazine.\par \par 3. Labs today. We will call with the results and any further instructions.\par \par 4. Please INCREASE METOLAZONE to 2.5mg THREE TIMES per WEEK (On Monday, Wednesdays and Fridays ONLY). \par \par 5. Continue the MILRINONE infusion and all your other medications as prescribed. \par \par 6. Continue to monitor your weight and BP at home. Call us if you notice a weight gain of 2 pounds in 2 days or 5 pounds in a week.\par \par 7. Please follow-up with the HF NP in 2 weeks on 3/6 at 1:30 PM and with Dr. Gallego 4/10 at 12PM.

## 2019-02-19 NOTE — PHYSICAL EXAM
[Normal Appearance] : normal appearance [General Appearance - In No Acute Distress] : no acute distress [Eyelids - No Xanthelasma] : the eyelids demonstrated no xanthelasmas [No Oral Pallor] : no oral pallor [No Oral Cyanosis] : no oral cyanosis [] : no respiratory distress [Respiration, Rhythm And Depth] : normal respiratory rhythm and effort [Exaggerated Use Of Accessory Muscles For Inspiration] : no accessory muscle use [Auscultation Breath Sounds / Voice Sounds] : lungs were clear to auscultation bilaterally [Heart Rate And Rhythm] : heart rate and rhythm were normal [Heart Sounds] : normal S1 and S2 [1+] : left 1+ [Bowel Sounds] : normal bowel sounds [Abdomen Soft] : soft [Abdomen Tenderness] : non-tender [Nail Clubbing] : no clubbing of the fingernails [Cyanosis, Localized] : no localized cyanosis [Skin Color & Pigmentation] : normal skin color and pigmentation [Oriented To Time, Place, And Person] : oriented to person, place, and time [Affect] : the affect was normal [Mood] : the mood was normal [FreeTextEntry1] : poor insight

## 2019-02-19 NOTE — DISCUSSION/SUMMARY
[Patient] : the patient [FreeTextEntry1] : Ms. Carson is a 62 y/o AA woman with dilated NICM (LVEF 9%, LVIDd 6.1 cm) s/p CRT-D, Mod-Severe MR, Severe TR, HLD, DM II, and Thyroid Cancer s/p Resection, Surgical Hypoparathyroidism with Chronic Hypocalcemia. She is AHA/ACC Stage D, NYHA class III, volume overloaded, low normotensive on continuous inotrope infusion. I have recommended the following.\par \par 1. Chronic systolic heart failure - Will continue milrinone 0.5mcg/kg/min. Will stop hydralazine and start losartan 12.5mg daily in favor of GDMT as renal function is normal. Will continue current doses of Toprol XL 12.5mg daily and spironolactone 50mg BID at this time. Will continue bumex 4mg BID and increase metolazone to 2.5mg TIW. Will recheck labs to reassess renal function and electrolytes. Will continue KCl 40 mEq BID pending review of labs. Stressed importance of medication compliance and communication with team regarding weight change and change in symptoms.\par \par 2. Hypokalemia- Will recheck labs today and adjust medications pending review of results.\par \par 3. RTC in 2 weeks with NP\par \par

## 2019-02-19 NOTE — HISTORY OF PRESENT ILLNESS
[FreeTextEntry1] : Ms. Carson is a 62 y/o AA woman with NICM (LVEF now 9%, LVIDd 6.1 cm), initially identified in 2013, s/p CRT-D (6/2017), Mod-Severe MR, Severe TR, HTN, HLD, DM II, and Thyroid Cancer s/p Resection, Surgical Hypoparathyroidism with Chronic Hypocalcemia.\par \par She has had 5 admissions to [facility] over the past year and to [facility] for ADHF, intermittently requiring IV inotropic support. She has not consistently followed-up with the advanced HF team resulting in suboptimal GDMT for her HF. \par \par She was then readmitted from 11/6/18-11/23/18 for ADHF. Her course was complicated by cardiogenic shock requiring IV inotropes and diuresis. She was discharged home on milrinone infusion via PICC line. Despite close outpatient follow up and escalation of diuretics she became progressively volume overloaded and symptomatic leading to admission 1/1-1/15/19 again for ADHF. Her discharge weight was 165lbs.\par \par She presents today for follow up with 11lb weight gain since she was last seen on 1/30. She notes her weight at home has been fluctuating, dropping several pounds on the days she takes metolazone, and over all ranging 175-186lbs. She has been minimally active but states she is able to do chores around the house such as cooking and laundry without dyspnea. She is able to climb a flight of stairs without SOB. She reports indigestion and belching. States she has only been eating small portions, reporting poor appetite. Denies early satiety or abdominal pain. Denies orthopnea, PND, CP, palpitations, lightheadedness, dizziness, abdominal distention, increased LE edema, and fatigue. Her ICD has not fired. She has not been to the ER or admitted to hospital since last visit. Reports following low sodium and fluid restricted diet. States she has been taking medications as prescribed.\par

## 2019-02-20 ENCOUNTER — RESULT REVIEW (OUTPATIENT)
Age: 64
End: 2019-02-20

## 2019-02-20 LAB
ALBUMIN SERPL ELPH-MCNC: 3.6 G/DL
ALP BLD-CCNC: 308 U/L
ALT SERPL-CCNC: 42 U/L
ANION GAP SERPL CALC-SCNC: 22 MMOL/L
AST SERPL-CCNC: 89 U/L
BILIRUB SERPL-MCNC: 4.9 MG/DL
BUN SERPL-MCNC: 42 MG/DL
CALCIUM SERPL-MCNC: 6 MG/DL
CHLORIDE SERPL-SCNC: 74 MMOL/L
CO2 SERPL-SCNC: 26 MMOL/L
CREAT SERPL-MCNC: 1.32 MG/DL
GLUCOSE SERPL-MCNC: 207 MG/DL
MAGNESIUM SERPL-MCNC: 1.5 MG/DL
NT-PROBNP SERPL-MCNC: 5824 PG/ML
POTASSIUM SERPL-SCNC: 2.7 MMOL/L
PROT SERPL-MCNC: 7.9 G/DL
SODIUM SERPL-SCNC: 122 MMOL/L

## 2019-02-27 ENCOUNTER — INPATIENT (INPATIENT)
Facility: HOSPITAL | Age: 64
LOS: 4 days | Discharge: HOME | DRG: 291 | End: 2019-03-04
Attending: STUDENT IN AN ORGANIZED HEALTH CARE EDUCATION/TRAINING PROGRAM | Admitting: INTERNAL MEDICINE
Payer: COMMERCIAL

## 2019-02-27 VITALS
HEIGHT: 67 IN | RESPIRATION RATE: 22 BRPM | TEMPERATURE: 98 F | OXYGEN SATURATION: 99 % | WEIGHT: 186.95 LBS | HEART RATE: 116 BPM | SYSTOLIC BLOOD PRESSURE: 108 MMHG | DIASTOLIC BLOOD PRESSURE: 72 MMHG

## 2019-02-27 DIAGNOSIS — D68.9 COAGULATION DEFECT, UNSPECIFIED: ICD-10-CM

## 2019-02-27 DIAGNOSIS — I50.9 HEART FAILURE, UNSPECIFIED: ICD-10-CM

## 2019-02-27 DIAGNOSIS — Z95.810 PRESENCE OF AUTOMATIC (IMPLANTABLE) CARDIAC DEFIBRILLATOR: Chronic | ICD-10-CM

## 2019-02-27 DIAGNOSIS — E03.9 HYPOTHYROIDISM, UNSPECIFIED: ICD-10-CM

## 2019-02-27 DIAGNOSIS — E87.1 HYPO-OSMOLALITY AND HYPONATREMIA: ICD-10-CM

## 2019-02-27 DIAGNOSIS — E83.51 HYPOCALCEMIA: ICD-10-CM

## 2019-02-27 DIAGNOSIS — I50.23 ACUTE ON CHRONIC SYSTOLIC (CONGESTIVE) HEART FAILURE: ICD-10-CM

## 2019-02-27 DIAGNOSIS — E11.9 TYPE 2 DIABETES MELLITUS WITHOUT COMPLICATIONS: ICD-10-CM

## 2019-02-27 DIAGNOSIS — D64.9 ANEMIA, UNSPECIFIED: ICD-10-CM

## 2019-02-27 LAB
ALBUMIN SERPL ELPH-MCNC: 3.9 G/DL — SIGNIFICANT CHANGE UP (ref 3.3–5)
ALP SERPL-CCNC: 379 U/L — HIGH (ref 40–120)
ALT FLD-CCNC: 31 U/L — SIGNIFICANT CHANGE UP (ref 10–45)
ANION GAP SERPL CALC-SCNC: 20 MMOL/L — HIGH (ref 5–17)
ANION GAP SERPL CALC-SCNC: 21 MMOL/L — HIGH (ref 5–17)
APTT BLD: 29.9 SEC — SIGNIFICANT CHANGE UP (ref 27.5–36.3)
AST SERPL-CCNC: 89 U/L — HIGH (ref 10–40)
BASE EXCESS BLDV CALC-SCNC: 7.4 MMOL/L — HIGH (ref -2–2)
BASOPHILS # BLD AUTO: 0 K/UL — SIGNIFICANT CHANGE UP (ref 0–0.2)
BASOPHILS NFR BLD AUTO: 0.3 % — SIGNIFICANT CHANGE UP (ref 0–2)
BILIRUB SERPL-MCNC: 6.3 MG/DL — HIGH (ref 0.2–1.2)
BUN SERPL-MCNC: 34 MG/DL — HIGH (ref 7–23)
BUN SERPL-MCNC: 34 MG/DL — HIGH (ref 7–23)
CA-I BLD-SCNC: 0.66 MMOL/L — CRITICAL LOW (ref 1.12–1.3)
CA-I SERPL-SCNC: 0.69 MMOL/L — CRITICAL LOW (ref 1.12–1.3)
CALCIUM SERPL-MCNC: 6.4 MG/DL — CRITICAL LOW (ref 8.4–10.5)
CALCIUM SERPL-MCNC: 6.5 MG/DL — CRITICAL LOW (ref 8.4–10.5)
CHLORIDE BLDV-SCNC: 80 MMOL/L — LOW (ref 96–108)
CHLORIDE SERPL-SCNC: 76 MMOL/L — LOW (ref 96–108)
CHLORIDE SERPL-SCNC: 76 MMOL/L — LOW (ref 96–108)
CO2 BLDV-SCNC: 33 MMOL/L — HIGH (ref 22–30)
CO2 SERPL-SCNC: 24 MMOL/L — SIGNIFICANT CHANGE UP (ref 22–31)
CO2 SERPL-SCNC: 27 MMOL/L — SIGNIFICANT CHANGE UP (ref 22–31)
CREAT SERPL-MCNC: 1.1 MG/DL — SIGNIFICANT CHANGE UP (ref 0.5–1.3)
CREAT SERPL-MCNC: 1.3 MG/DL — SIGNIFICANT CHANGE UP (ref 0.5–1.3)
EOSINOPHIL # BLD AUTO: 0.1 K/UL — SIGNIFICANT CHANGE UP (ref 0–0.5)
EOSINOPHIL NFR BLD AUTO: 0.6 % — SIGNIFICANT CHANGE UP (ref 0–6)
GAS PNL BLDV: 122 MMOL/L — LOW (ref 136–145)
GAS PNL BLDV: SIGNIFICANT CHANGE UP
GAS PNL BLDV: SIGNIFICANT CHANGE UP
GLUCOSE BLDV-MCNC: 176 MG/DL — HIGH (ref 70–99)
GLUCOSE SERPL-MCNC: 176 MG/DL — HIGH (ref 70–99)
GLUCOSE SERPL-MCNC: 198 MG/DL — HIGH (ref 70–99)
HCO3 BLDV-SCNC: 32 MMOL/L — HIGH (ref 21–29)
HCT VFR BLD CALC: 34 % — LOW (ref 34.5–45)
HCT VFR BLDA CALC: 28 % — LOW (ref 39–50)
HGB BLD CALC-MCNC: 9.1 G/DL — LOW (ref 11.5–15.5)
HGB BLD-MCNC: 10.9 G/DL — LOW (ref 11.5–15.5)
INR BLD: 3.14 RATIO — HIGH (ref 0.88–1.16)
LACTATE BLDV-MCNC: 2.6 MMOL/L — HIGH (ref 0.7–2)
LYMPHOCYTES # BLD AUTO: 1.2 K/UL — SIGNIFICANT CHANGE UP (ref 1–3.3)
LYMPHOCYTES # BLD AUTO: 13.2 % — SIGNIFICANT CHANGE UP (ref 13–44)
MCHC RBC-ENTMCNC: 23.6 PG — LOW (ref 27–34)
MCHC RBC-ENTMCNC: 32.2 GM/DL — SIGNIFICANT CHANGE UP (ref 32–36)
MCV RBC AUTO: 73.1 FL — LOW (ref 80–100)
MONOCYTES # BLD AUTO: 0.7 K/UL — SIGNIFICANT CHANGE UP (ref 0–0.9)
MONOCYTES NFR BLD AUTO: 8.1 % — SIGNIFICANT CHANGE UP (ref 2–14)
NEUTROPHILS # BLD AUTO: 7.1 K/UL — SIGNIFICANT CHANGE UP (ref 1.8–7.4)
NEUTROPHILS NFR BLD AUTO: 77.7 % — HIGH (ref 43–77)
NT-PROBNP SERPL-SCNC: 2995 PG/ML — HIGH (ref 0–300)
PCO2 BLDV: 44 MMHG — SIGNIFICANT CHANGE UP (ref 35–50)
PH BLDV: 7.47 — HIGH (ref 7.35–7.45)
PLATELET # BLD AUTO: 250 K/UL — SIGNIFICANT CHANGE UP (ref 150–400)
PO2 BLDV: 28 MMHG — SIGNIFICANT CHANGE UP (ref 25–45)
POTASSIUM BLDV-SCNC: 3.6 MMOL/L — SIGNIFICANT CHANGE UP (ref 3.5–5.3)
POTASSIUM SERPL-MCNC: 3.7 MMOL/L — SIGNIFICANT CHANGE UP (ref 3.5–5.3)
POTASSIUM SERPL-MCNC: 6.4 MMOL/L — CRITICAL HIGH (ref 3.5–5.3)
POTASSIUM SERPL-SCNC: 3.7 MMOL/L — SIGNIFICANT CHANGE UP (ref 3.5–5.3)
POTASSIUM SERPL-SCNC: 6.4 MMOL/L — CRITICAL HIGH (ref 3.5–5.3)
PROT SERPL-MCNC: 8.5 G/DL — HIGH (ref 6–8.3)
PROTHROM AB SERPL-ACNC: 37.1 SEC — HIGH (ref 10–12.9)
RBC # BLD: 4.64 M/UL — SIGNIFICANT CHANGE UP (ref 3.8–5.2)
RBC # FLD: 22.3 % — HIGH (ref 10.3–14.5)
SAO2 % BLDV: 44 % — LOW (ref 67–88)
SODIUM SERPL-SCNC: 120 MMOL/L — CRITICAL LOW (ref 135–145)
SODIUM SERPL-SCNC: 124 MMOL/L — LOW (ref 135–145)
TROPONIN T, HIGH SENSITIVITY RESULT: 29 NG/L — SIGNIFICANT CHANGE UP (ref 0–51)
TROPONIN T, HIGH SENSITIVITY RESULT: 31 NG/L — SIGNIFICANT CHANGE UP (ref 0–51)
WBC # BLD: 9.2 K/UL — SIGNIFICANT CHANGE UP (ref 3.8–10.5)
WBC # FLD AUTO: 9.2 K/UL — SIGNIFICANT CHANGE UP (ref 3.8–10.5)

## 2019-02-27 PROCEDURE — 93010 ELECTROCARDIOGRAM REPORT: CPT

## 2019-02-27 PROCEDURE — 99285 EMERGENCY DEPT VISIT HI MDM: CPT | Mod: 25

## 2019-02-27 PROCEDURE — 71046 X-RAY EXAM CHEST 2 VIEWS: CPT | Mod: 26

## 2019-02-27 PROCEDURE — 99223 1ST HOSP IP/OBS HIGH 75: CPT | Mod: GC

## 2019-02-27 RX ORDER — ONDANSETRON 8 MG/1
1 TABLET, FILM COATED ORAL
Qty: 0 | Refills: 0 | COMMUNITY

## 2019-02-27 RX ORDER — CALCITRIOL 0.5 UG/1
0.25 CAPSULE ORAL DAILY
Qty: 0 | Refills: 0 | Status: DISCONTINUED | OUTPATIENT
Start: 2019-02-27 | End: 2019-02-28

## 2019-02-27 RX ORDER — DEXTROSE 50 % IN WATER 50 %
15 SYRINGE (ML) INTRAVENOUS ONCE
Qty: 0 | Refills: 0 | Status: DISCONTINUED | OUTPATIENT
Start: 2019-02-27 | End: 2019-03-04

## 2019-02-27 RX ORDER — SODIUM CHLORIDE 9 MG/ML
1000 INJECTION, SOLUTION INTRAVENOUS
Qty: 0 | Refills: 0 | Status: DISCONTINUED | OUTPATIENT
Start: 2019-02-27 | End: 2019-03-04

## 2019-02-27 RX ORDER — GABAPENTIN 400 MG/1
200 CAPSULE ORAL
Qty: 0 | Refills: 0 | Status: DISCONTINUED | OUTPATIENT
Start: 2019-02-27 | End: 2019-03-04

## 2019-02-27 RX ORDER — DEXTROSE 50 % IN WATER 50 %
12.5 SYRINGE (ML) INTRAVENOUS ONCE
Qty: 0 | Refills: 0 | Status: DISCONTINUED | OUTPATIENT
Start: 2019-02-27 | End: 2019-03-04

## 2019-02-27 RX ORDER — INSULIN LISPRO 100/ML
VIAL (ML) SUBCUTANEOUS
Qty: 0 | Refills: 0 | Status: DISCONTINUED | OUTPATIENT
Start: 2019-02-27 | End: 2019-03-04

## 2019-02-27 RX ORDER — CALCIUM ACETATE 667 MG
667 TABLET ORAL
Qty: 0 | Refills: 0 | Status: DISCONTINUED | OUTPATIENT
Start: 2019-02-27 | End: 2019-03-04

## 2019-02-27 RX ORDER — METOPROLOL TARTRATE 50 MG
12.5 TABLET ORAL DAILY
Qty: 0 | Refills: 0 | Status: DISCONTINUED | OUTPATIENT
Start: 2019-02-27 | End: 2019-03-04

## 2019-02-27 RX ORDER — MILRINONE LACTATE 1 MG/ML
0.56 INJECTION, SOLUTION INTRAVENOUS
Qty: 20 | Refills: 0 | Status: DISCONTINUED | OUTPATIENT
Start: 2019-02-27 | End: 2019-02-28

## 2019-02-27 RX ORDER — OMEPRAZOLE 10 MG/1
1 CAPSULE, DELAYED RELEASE ORAL
Qty: 0 | Refills: 0 | COMMUNITY

## 2019-02-27 RX ORDER — DEXTROSE 50 % IN WATER 50 %
25 SYRINGE (ML) INTRAVENOUS ONCE
Qty: 0 | Refills: 0 | Status: DISCONTINUED | OUTPATIENT
Start: 2019-02-27 | End: 2019-03-04

## 2019-02-27 RX ORDER — INSULIN GLARGINE 100 [IU]/ML
10 INJECTION, SOLUTION SUBCUTANEOUS AT BEDTIME
Qty: 0 | Refills: 0 | Status: DISCONTINUED | OUTPATIENT
Start: 2019-02-27 | End: 2019-03-03

## 2019-02-27 RX ORDER — GABAPENTIN 400 MG/1
2 CAPSULE ORAL
Qty: 0 | Refills: 0 | COMMUNITY

## 2019-02-27 RX ORDER — PHYTONADIONE (VIT K1) 5 MG
5 TABLET ORAL ONCE
Qty: 0 | Refills: 0 | Status: COMPLETED | OUTPATIENT
Start: 2019-02-27 | End: 2019-02-28

## 2019-02-27 RX ORDER — SPIRONOLACTONE 25 MG/1
25 TABLET, FILM COATED ORAL
Qty: 0 | Refills: 0 | Status: DISCONTINUED | OUTPATIENT
Start: 2019-02-27 | End: 2019-03-04

## 2019-02-27 RX ORDER — ASPIRIN/CALCIUM CARB/MAGNESIUM 324 MG
81 TABLET ORAL DAILY
Qty: 0 | Refills: 0 | Status: DISCONTINUED | OUTPATIENT
Start: 2019-02-27 | End: 2019-03-04

## 2019-02-27 RX ORDER — INSULIN LISPRO 100/ML
VIAL (ML) SUBCUTANEOUS AT BEDTIME
Qty: 0 | Refills: 0 | Status: DISCONTINUED | OUTPATIENT
Start: 2019-02-27 | End: 2019-03-04

## 2019-02-27 RX ORDER — GLUCAGON INJECTION, SOLUTION 0.5 MG/.1ML
1 INJECTION, SOLUTION SUBCUTANEOUS ONCE
Qty: 0 | Refills: 0 | Status: DISCONTINUED | OUTPATIENT
Start: 2019-02-27 | End: 2019-03-04

## 2019-02-27 RX ORDER — LEVOTHYROXINE SODIUM 125 MCG
1 TABLET ORAL
Qty: 0 | Refills: 0 | COMMUNITY

## 2019-02-27 RX ORDER — BUMETANIDE 0.25 MG/ML
2 INJECTION INTRAMUSCULAR; INTRAVENOUS
Qty: 0 | Refills: 0 | Status: DISCONTINUED | OUTPATIENT
Start: 2019-02-27 | End: 2019-02-28

## 2019-02-27 RX ORDER — LEVOTHYROXINE SODIUM 125 MCG
175 TABLET ORAL DAILY
Qty: 0 | Refills: 0 | Status: DISCONTINUED | OUTPATIENT
Start: 2019-02-27 | End: 2019-03-04

## 2019-02-27 RX ORDER — CALCITRIOL 0.5 UG/1
1 CAPSULE ORAL
Qty: 30 | Refills: 0 | OUTPATIENT

## 2019-02-27 RX ORDER — CALCIUM GLUCONATE 100 MG/ML
1 VIAL (ML) INTRAVENOUS ONCE
Qty: 0 | Refills: 0 | Status: COMPLETED | OUTPATIENT
Start: 2019-02-27 | End: 2019-02-27

## 2019-02-27 RX ADMIN — Medication 175 MICROGRAM(S): at 23:16

## 2019-02-27 RX ADMIN — MILRINONE LACTATE 12.8 MICROGRAM(S)/KG/MIN: 1 INJECTION, SOLUTION INTRAVENOUS at 21:13

## 2019-02-27 RX ADMIN — Medication 12.5 MILLIGRAM(S): at 23:16

## 2019-02-27 RX ADMIN — GABAPENTIN 200 MILLIGRAM(S): 400 CAPSULE ORAL at 23:16

## 2019-02-27 RX ADMIN — Medication 400 GRAM(S): at 22:38

## 2019-02-27 RX ADMIN — Medication 81 MILLIGRAM(S): at 23:16

## 2019-02-27 NOTE — ED ADULT NURSE NOTE - OBJECTIVE STATEMENT
63 y f came to the ed c/o cough since her d/c in november and sob with activity, i.e walking up stairs. patient is a/ox3. denies any fevers, chills, chest pain. has picc line in right arm for milrinone drip which she states is a continuous infusion.

## 2019-02-27 NOTE — ED PROVIDER NOTE - GASTROINTESTINAL, MLM
Abdomen soft, non-tender, no guarding, no organomegaly, normoactive bowel sounds, no palpable masses

## 2019-02-27 NOTE — ED ADULT TRIAGE NOTE - CHIEF COMPLAINT QUOTE
shortness of breath and cough, seen by visiting nurse today advised to come to ED for further evaluation

## 2019-02-27 NOTE — ED PROVIDER NOTE - CONSTITUTIONAL, MLM
normal... , awake, alert, oriented to person, place, time/situation and in no apparent distress. Sitting up in bed, in no apparent respirator distress, speaking full sentences, no use of accessory muscles

## 2019-02-27 NOTE — ED PROVIDER NOTE - ATTENDING CONTRIBUTION TO CARE
Attending MD Gordon:  I personally have seen and examined this patient.  Resident note reviewed and agree on plan of care and except where noted.  See MDM for details.

## 2019-02-27 NOTE — ED PROVIDER NOTE - PROGRESS NOTE DETAILS
Spoke with Dr. Rojas. Will admit to his service for further management. Spoke w/ cardiology fellow covering HF. Will evaluate patient.

## 2019-02-27 NOTE — ED PROVIDER NOTE - CLINICAL SUMMARY MEDICAL DECISION MAKING FREE TEXT BOX
Attending MD Gordon: 64 yo female with with PMH for CHF (EF 10%) on milrinone infusion via PICC line, thyroidectomy, presents at request of VNS as patient has gained 20lb since mid-january.  VNS called Dr. Gallego office and she was instructed to come here.  Pt with worsening chronic cough, decrease appetite with 2 episodes of vomiting.  Only SOB when climbing stairs.  Denies fevers or chills.  No medication or diet non-compliance. Denies increase in LE edema.  Exam (MD Evan):  +JVD, lungs are clear, and LE with 2+ pitting edema bilaterally (pt states baseline).  DDX CHF exacerbation.  Plan; labs, cardiac enzymes, xray chest, heart failure consult.

## 2019-02-27 NOTE — H&P ADULT - RS GEN PE MLT RESP DETAILS PC
airway patent/good air movement/clear to auscultation bilaterally/respirations non-labored/normal/breath sounds equal

## 2019-02-27 NOTE — H&P ADULT - PROBLEM SELECTOR PLAN 2
Likely sec to Liver congestion . Will check Ammonia level in AM . Will consult Hepatology if AM labs are worse.

## 2019-02-27 NOTE — ED PROVIDER NOTE - OBJECTIVE STATEMENT
64 yo F h/o CHF on cont. milrinone infusion via PICC, HTN, DM, who reports a few days of progressively worsening SOB at rest and w/ exertion as compared to her usual baseline.  Associated w/ bilateral LE non painful edema over the last 2-3 days. Pt. w/ chronic non productive cough at baseline but this has also worsened over the last 2 days. She denies hemoptysis. States that she had a couple episodes of vomiting which occurred after intense coughing. She experienced one episode of emesis yesterday evening after dinner which was described as non bloody mucus. She denies abdominal pain or discomfort. Last BM was this morning normal appearing. Denies CP. Denies pleuritic pain. No reports of fevers, chill, flank or back pain, urinary complaints. Visiting nurse today aware of patient's worsening cough and SOB contacted patients cardiologist and patient was referred to the ED for admission.

## 2019-02-27 NOTE — H&P ADULT - HISTORY OF PRESENT ILLNESS
64 yo F h/o Severe  Cardiomyopathy ,DM, Thyroid cancer CHF on cont. milrinone infusion via PICC, HTN, DM, who reports a few days of progressively worsening SOB at rest and w/ exertion as compared to her usual baseline.  Associated w/ bilateral LE non painful edema over the last 2-3 days. Pt. w/ chronic non productive cough at baseline but this has also worsened over the last 2 days. She denies hemoptysis. States that she had a couple episodes of vomiting which occurred after intense coughing. She experienced one episode of emesis yesterday evening after dinner which was described as non bloody mucus. She denies abdominal pain or discomfort. Last BM was this morning normal appearing. Denies CP. Denies pleuritic pain. No reports of fevers, chill, flank or back pain, urinary complaints. Visiting nurse today aware of patient's worsening cough and SOB contacted patients cardiologist and patient was referred to the ED for admission.

## 2019-02-28 LAB
ALBUMIN SERPL ELPH-MCNC: 3.2 G/DL — LOW (ref 3.3–5)
ALBUMIN SERPL ELPH-MCNC: 3.5 G/DL — SIGNIFICANT CHANGE UP (ref 3.3–5)
ALP SERPL-CCNC: 336 U/L — HIGH (ref 40–120)
ALP SERPL-CCNC: 354 U/L — HIGH (ref 40–120)
ALT FLD-CCNC: 28 U/L — SIGNIFICANT CHANGE UP (ref 10–45)
ALT FLD-CCNC: 29 U/L — SIGNIFICANT CHANGE UP (ref 10–45)
AMMONIA BLD-MCNC: 49 UMOL/L — SIGNIFICANT CHANGE UP (ref 11–55)
ANION GAP SERPL CALC-SCNC: 19 MMOL/L — HIGH (ref 5–17)
ANION GAP SERPL CALC-SCNC: 21 MMOL/L — HIGH (ref 5–17)
APTT BLD: 27 SEC — LOW (ref 27.5–36.3)
AST SERPL-CCNC: 60 U/L — HIGH (ref 10–40)
AST SERPL-CCNC: 66 U/L — HIGH (ref 10–40)
BILIRUB SERPL-MCNC: 6.2 MG/DL — HIGH (ref 0.2–1.2)
BILIRUB SERPL-MCNC: 6.8 MG/DL — HIGH (ref 0.2–1.2)
BUN SERPL-MCNC: 33 MG/DL — HIGH (ref 7–23)
BUN SERPL-MCNC: 34 MG/DL — HIGH (ref 7–23)
CALCIUM SERPL-MCNC: 6.7 MG/DL — LOW (ref 8.4–10.5)
CALCIUM SERPL-MCNC: 7 MG/DL — LOW (ref 8.4–10.5)
CHLORIDE SERPL-SCNC: 76 MMOL/L — LOW (ref 96–108)
CHLORIDE SERPL-SCNC: 76 MMOL/L — LOW (ref 96–108)
CO2 SERPL-SCNC: 26 MMOL/L — SIGNIFICANT CHANGE UP (ref 22–31)
CO2 SERPL-SCNC: 29 MMOL/L — SIGNIFICANT CHANGE UP (ref 22–31)
CREAT SERPL-MCNC: 1.33 MG/DL — HIGH (ref 0.5–1.3)
CREAT SERPL-MCNC: 1.4 MG/DL — HIGH (ref 0.5–1.3)
CRP SERPL-MCNC: 7.13 MG/DL — HIGH (ref 0–0.4)
ERYTHROCYTE [SEDIMENTATION RATE] IN BLOOD: 120 MM/HR — HIGH (ref 0–20)
GLUCOSE BLDC GLUCOMTR-MCNC: 151 MG/DL — HIGH (ref 70–99)
GLUCOSE BLDC GLUCOMTR-MCNC: 161 MG/DL — HIGH (ref 70–99)
GLUCOSE BLDC GLUCOMTR-MCNC: 169 MG/DL — HIGH (ref 70–99)
GLUCOSE BLDC GLUCOMTR-MCNC: 184 MG/DL — HIGH (ref 70–99)
GLUCOSE BLDC GLUCOMTR-MCNC: 186 MG/DL — HIGH (ref 70–99)
GLUCOSE SERPL-MCNC: 202 MG/DL — HIGH (ref 70–99)
GLUCOSE SERPL-MCNC: 211 MG/DL — HIGH (ref 70–99)
HCOV PNL SPEC NAA+PROBE: DETECTED
HCT VFR BLD CALC: 27 % — LOW (ref 34.5–45)
HCT VFR BLD CALC: 29.4 % — LOW (ref 34.5–45)
HCV AB S/CO SERPL IA: 0.21 S/CO — SIGNIFICANT CHANGE UP (ref 0–0.79)
HCV AB SERPL-IMP: SIGNIFICANT CHANGE UP
HGB BLD-MCNC: 9 G/DL — LOW (ref 11.5–15.5)
HGB BLD-MCNC: 9.9 G/DL — LOW (ref 11.5–15.5)
INR BLD: 3.15 RATIO — HIGH (ref 0.88–1.16)
INR BLD: 3.15 RATIO — HIGH (ref 0.88–1.16)
LACTATE SERPL-SCNC: 2.3 MMOL/L — HIGH (ref 0.7–2)
LACTATE SERPL-SCNC: 2.9 MMOL/L — HIGH (ref 0.7–2)
MAGNESIUM SERPL-MCNC: 1.4 MG/DL — LOW (ref 1.6–2.6)
MCHC RBC-ENTMCNC: 22.9 PG — LOW (ref 27–34)
MCHC RBC-ENTMCNC: 24.4 PG — LOW (ref 27–34)
MCHC RBC-ENTMCNC: 33.3 GM/DL — SIGNIFICANT CHANGE UP (ref 32–36)
MCHC RBC-ENTMCNC: 33.7 GM/DL — SIGNIFICANT CHANGE UP (ref 32–36)
MCV RBC AUTO: 68.7 FL — LOW (ref 80–100)
MCV RBC AUTO: 72.4 FL — LOW (ref 80–100)
PHOSPHATE SERPL-MCNC: 4.1 MG/DL — SIGNIFICANT CHANGE UP (ref 2.5–4.5)
PLATELET # BLD AUTO: 181 K/UL — SIGNIFICANT CHANGE UP (ref 150–400)
PLATELET # BLD AUTO: 218 K/UL — SIGNIFICANT CHANGE UP (ref 150–400)
POTASSIUM SERPL-MCNC: 3.3 MMOL/L — LOW (ref 3.5–5.3)
POTASSIUM SERPL-MCNC: 4.3 MMOL/L — SIGNIFICANT CHANGE UP (ref 3.5–5.3)
POTASSIUM SERPL-SCNC: 3.3 MMOL/L — LOW (ref 3.5–5.3)
POTASSIUM SERPL-SCNC: 4.3 MMOL/L — SIGNIFICANT CHANGE UP (ref 3.5–5.3)
PROCALCITONIN SERPL-MCNC: 1.92 NG/ML — HIGH (ref 0.02–0.1)
PROT SERPL-MCNC: 7.5 G/DL — SIGNIFICANT CHANGE UP (ref 6–8.3)
PROT SERPL-MCNC: 7.5 G/DL — SIGNIFICANT CHANGE UP (ref 6–8.3)
PROTHROM AB SERPL-ACNC: 36.9 SEC — HIGH (ref 10–13.1)
PROTHROM AB SERPL-ACNC: 37.5 SEC — HIGH (ref 10–12.9)
RAPID RVP RESULT: DETECTED
RBC # BLD: 3.93 M/UL — SIGNIFICANT CHANGE UP (ref 3.8–5.2)
RBC # BLD: 4.06 M/UL — SIGNIFICANT CHANGE UP (ref 3.8–5.2)
RBC # FLD: 22.5 % — HIGH (ref 10.3–14.5)
RBC # FLD: 23 % — HIGH (ref 10.3–14.5)
SODIUM SERPL-SCNC: 123 MMOL/L — LOW (ref 135–145)
SODIUM SERPL-SCNC: 124 MMOL/L — LOW (ref 135–145)
WBC # BLD: 8.5 K/UL — SIGNIFICANT CHANGE UP (ref 3.8–10.5)
WBC # BLD: 9.85 K/UL — SIGNIFICANT CHANGE UP (ref 3.8–10.5)
WBC # FLD AUTO: 8.5 K/UL — SIGNIFICANT CHANGE UP (ref 3.8–10.5)
WBC # FLD AUTO: 9.85 K/UL — SIGNIFICANT CHANGE UP (ref 3.8–10.5)

## 2019-02-28 PROCEDURE — 99291 CRITICAL CARE FIRST HOUR: CPT

## 2019-02-28 PROCEDURE — 99223 1ST HOSP IP/OBS HIGH 75: CPT | Mod: GC

## 2019-02-28 PROCEDURE — 99232 SBSQ HOSP IP/OBS MODERATE 35: CPT

## 2019-02-28 PROCEDURE — 93010 ELECTROCARDIOGRAM REPORT: CPT

## 2019-02-28 PROCEDURE — 71045 X-RAY EXAM CHEST 1 VIEW: CPT | Mod: 26

## 2019-02-28 RX ORDER — MAGNESIUM OXIDE 400 MG ORAL TABLET 241.3 MG
400 TABLET ORAL
Qty: 0 | Refills: 0 | Status: DISCONTINUED | OUTPATIENT
Start: 2019-02-28 | End: 2019-03-04

## 2019-02-28 RX ORDER — MILRINONE LACTATE 1 MG/ML
0.5 INJECTION, SOLUTION INTRAVENOUS
Qty: 20 | Refills: 0 | Status: DISCONTINUED | OUTPATIENT
Start: 2019-02-28 | End: 2019-03-04

## 2019-02-28 RX ORDER — BUMETANIDE 0.25 MG/ML
2 INJECTION INTRAMUSCULAR; INTRAVENOUS ONCE
Qty: 0 | Refills: 0 | Status: COMPLETED | OUTPATIENT
Start: 2019-02-28 | End: 2019-02-28

## 2019-02-28 RX ORDER — CEFTRIAXONE 500 MG/1
1 INJECTION, POWDER, FOR SOLUTION INTRAMUSCULAR; INTRAVENOUS ONCE
Qty: 0 | Refills: 0 | Status: DISCONTINUED | OUTPATIENT
Start: 2019-02-28 | End: 2019-03-01

## 2019-02-28 RX ORDER — CALCITRIOL 0.5 UG/1
0.5 CAPSULE ORAL DAILY
Qty: 0 | Refills: 0 | Status: DISCONTINUED | OUTPATIENT
Start: 2019-02-28 | End: 2019-03-04

## 2019-02-28 RX ORDER — MILRINONE LACTATE 1 MG/ML
0.56 INJECTION, SOLUTION INTRAVENOUS
Qty: 20 | Refills: 0 | Status: DISCONTINUED | OUTPATIENT
Start: 2019-02-28 | End: 2019-02-28

## 2019-02-28 RX ORDER — BUMETANIDE 0.25 MG/ML
2 INJECTION INTRAMUSCULAR; INTRAVENOUS
Qty: 20 | Refills: 0 | Status: DISCONTINUED | OUTPATIENT
Start: 2019-02-28 | End: 2019-03-01

## 2019-02-28 RX ORDER — AZITHROMYCIN 500 MG/1
500 TABLET, FILM COATED ORAL ONCE
Qty: 0 | Refills: 0 | Status: COMPLETED | OUTPATIENT
Start: 2019-02-28 | End: 2019-02-28

## 2019-02-28 RX ORDER — HYDRALAZINE HCL 50 MG
10 TABLET ORAL EVERY 8 HOURS
Qty: 0 | Refills: 0 | Status: DISCONTINUED | OUTPATIENT
Start: 2019-02-28 | End: 2019-03-01

## 2019-02-28 RX ORDER — POTASSIUM CHLORIDE 20 MEQ
40 PACKET (EA) ORAL ONCE
Qty: 0 | Refills: 0 | Status: COMPLETED | OUTPATIENT
Start: 2019-02-28 | End: 2019-02-28

## 2019-02-28 RX ORDER — ACETAMINOPHEN 500 MG
1000 TABLET ORAL ONCE
Qty: 0 | Refills: 0 | Status: COMPLETED | OUTPATIENT
Start: 2019-02-28 | End: 2019-02-28

## 2019-02-28 RX ORDER — VANCOMYCIN HCL 1 G
1000 VIAL (EA) INTRAVENOUS ONCE
Qty: 0 | Refills: 0 | Status: DISCONTINUED | OUTPATIENT
Start: 2019-02-28 | End: 2019-03-01

## 2019-02-28 RX ADMIN — Medication 667 MILLIGRAM(S): at 00:09

## 2019-02-28 RX ADMIN — INSULIN GLARGINE 10 UNIT(S): 100 INJECTION, SOLUTION SUBCUTANEOUS at 22:14

## 2019-02-28 RX ADMIN — GABAPENTIN 200 MILLIGRAM(S): 400 CAPSULE ORAL at 17:14

## 2019-02-28 RX ADMIN — Medication 81 MILLIGRAM(S): at 12:30

## 2019-02-28 RX ADMIN — Medication 2: at 17:18

## 2019-02-28 RX ADMIN — SPIRONOLACTONE 25 MILLIGRAM(S): 25 TABLET, FILM COATED ORAL at 22:14

## 2019-02-28 RX ADMIN — Medication 667 MILLIGRAM(S): at 17:14

## 2019-02-28 RX ADMIN — Medication 667 MILLIGRAM(S): at 12:30

## 2019-02-28 RX ADMIN — Medication 40 MILLIEQUIVALENT(S): at 17:15

## 2019-02-28 RX ADMIN — BUMETANIDE 2 MILLIGRAM(S): 0.25 INJECTION INTRAMUSCULAR; INTRAVENOUS at 00:09

## 2019-02-28 RX ADMIN — Medication 5 MILLIGRAM(S): at 00:09

## 2019-02-28 RX ADMIN — MAGNESIUM OXIDE 400 MG ORAL TABLET 400 MILLIGRAM(S): 241.3 TABLET ORAL at 17:15

## 2019-02-28 RX ADMIN — Medication 1 TABLET(S): at 17:15

## 2019-02-28 RX ADMIN — Medication 1 TABLET(S): at 05:48

## 2019-02-28 RX ADMIN — CALCITRIOL 0.5 MICROGRAM(S): 0.5 CAPSULE ORAL at 12:30

## 2019-02-28 RX ADMIN — Medication 175 MICROGRAM(S): at 05:48

## 2019-02-28 RX ADMIN — SPIRONOLACTONE 25 MILLIGRAM(S): 25 TABLET, FILM COATED ORAL at 00:09

## 2019-02-28 RX ADMIN — MILRINONE LACTATE 12.8 MICROGRAM(S)/KG/MIN: 1 INJECTION, SOLUTION INTRAVENOUS at 04:19

## 2019-02-28 RX ADMIN — Medication 1 TABLET(S): at 00:09

## 2019-02-28 RX ADMIN — MAGNESIUM OXIDE 400 MG ORAL TABLET 400 MILLIGRAM(S): 241.3 TABLET ORAL at 12:30

## 2019-02-28 RX ADMIN — Medication 1 TABLET(S): at 12:32

## 2019-02-28 RX ADMIN — Medication 1 TABLET(S): at 23:54

## 2019-02-28 RX ADMIN — Medication 667 MILLIGRAM(S): at 22:15

## 2019-02-28 RX ADMIN — BUMETANIDE 10 MG/HR: 0.25 INJECTION INTRAMUSCULAR; INTRAVENOUS at 07:54

## 2019-02-28 RX ADMIN — CALCITRIOL 0.25 MICROGRAM(S): 0.5 CAPSULE ORAL at 00:09

## 2019-02-28 RX ADMIN — BUMETANIDE 2 MILLIGRAM(S): 0.25 INJECTION INTRAMUSCULAR; INTRAVENOUS at 06:20

## 2019-02-28 RX ADMIN — AZITHROMYCIN 250 MILLIGRAM(S): 500 TABLET, FILM COATED ORAL at 12:30

## 2019-02-28 RX ADMIN — Medication 400 MILLIGRAM(S): at 06:20

## 2019-02-28 RX ADMIN — MILRINONE LACTATE 12.72 MICROGRAM(S)/KG/MIN: 1 INJECTION, SOLUTION INTRAVENOUS at 12:57

## 2019-02-28 RX ADMIN — Medication 2: at 12:41

## 2019-02-28 RX ADMIN — MILRINONE LACTATE 12.72 MICROGRAM(S)/KG/MIN: 1 INJECTION, SOLUTION INTRAVENOUS at 06:29

## 2019-02-28 RX ADMIN — Medication 10 MILLIGRAM(S): at 22:15

## 2019-02-28 RX ADMIN — GABAPENTIN 200 MILLIGRAM(S): 400 CAPSULE ORAL at 05:47

## 2019-02-28 RX ADMIN — BUMETANIDE 10 MG/HR: 0.25 INJECTION INTRAMUSCULAR; INTRAVENOUS at 16:08

## 2019-02-28 NOTE — CONSULT NOTE ADULT - ASSESSMENT
62 yo F with a PMH NICM(EF 10%, LVIDD 6.1cm, dx 2013, milrinone dependent) s/p CRT-D (6/2017), Mod-Severe MR, Severe TR, HTN, HLD, DM, and thyroid CA s/p resection c/b hypoparathyroidism with chronic hypocalcemia who presents for acute on chronic systolic heart failure.     #ADHF  Patient with volume overload on exam. She is about 20 pounds above her weight from Jan 14th. This is most likely 2/2 noncompliance and poor insight into her condition.   - admit to telemetry  - continue milrinone 0.5 mcg/kg/min  - metolazone 5mg daily first dose now then Bumex 2mg IVP then gtt at 2mg/hr  - hold home losartan given MAGNUS(baseline Cr 0.74 from Nov 2018), start hydral 10mg PO TID  - daily weights, strict Is/Os, fluid restrict 1L 64 yo F with a PMH NICM(EF 10%, LVIDD 6.1cm, dx 2013, milrinone dependent) s/p CRT-D (6/2017), Mod-Severe MR, Severe TR, HTN, HLD, DM, and thyroid CA s/p resection c/b hypoparathyroidism with chronic hypocalcemia who presents for acute on chronic systolic heart failure.     #ADHF  Patient with volume overload on exam. She is about 20 pounds above her weight from Jan 14th. This is most likely 2/2 noncompliance and poor insight into her condition.   - admit to telemetry  - continue milrinone 0.5 mcg/kg/min  - metolazone 5mg daily first dose now then Bumex 2mg IVP then gtt at 2mg/hr  - hold home losartan given MAGNUS(baseline Cr 0.74 from Nov 2018), start hydral 10mg PO TID  - continue home aldactone 25mg BID  - daily weights, strict Is/Os, fluid restrict 1L

## 2019-02-28 NOTE — CONSULT NOTE ADULT - SUBJECTIVE AND OBJECTIVE BOX
HISTORY OF PRESENT ILLNESS: HPI:  62 yo F h/o Severe  NICM MDT BIV ICD DM, Thyroid cancer CHF on cont. milrinone infusion via PICC, HTN, DM, who reports a few days of progressively worsening SOB at rest and w/ exertion as compared to her usual baseline.  Associated w/ bilateral LE non painful edema over the last 2-3 days. Pt. w/ chronic non productive cough at baseline but this has also worsened over the last 2 days. She denies hemoptysis. States that she had a couple episodes of vomiting which occurred after intense coughing. She experienced one episode of emesis yesterday evening after dinner which was described as non bloody mucus. She denies abdominal pain or discomfort. Last BM was this morning normal appearing. Denies CP. Denies pleuritic pain. No reports of fevers, chill, flank or back pain, urinary complaints. Visiting nurse today aware of patient's worsening cough and SOB contacted patients cardiologist and patient was referred to the ED for admission. (+) 20 lb weight gain since last admit      PAST MEDICAL & SURGICAL HISTORY:  Asthma  CHF (congestive heart failure): with EF of 10% s/p AICD  DM (diabetes mellitus)  HTN (hypertension)  Thyroid ca  ICD   S/P thyroidectomy          MEDICATIONS:  MEDICATIONS  (STANDING):  aspirin enteric coated 81 milliGRAM(s) Oral daily  buMETAnide Infusion 2 mG/Hr (10 mL/Hr) IV Continuous <Continuous>  calcitriol   Capsule 0.5 MICROGram(s) Oral daily  calcium acetate 667 milliGRAM(s) Oral four times a day with meals  calcium carbonate 1250 mG  + Vitamin D (OsCal 500 + D) 1 Tablet(s) Oral four times a day  cefTRIAXone   IVPB 1 Gram(s) IV Intermittent once  dextrose 5%. 1000 milliLiter(s) (50 mL/Hr) IV Continuous <Continuous>  dextrose 50% Injectable 12.5 Gram(s) IV Push once  dextrose 50% Injectable 25 Gram(s) IV Push once  dextrose 50% Injectable 25 Gram(s) IV Push once  gabapentin 200 milliGRAM(s) Oral two times a day  hydrALAZINE 10 milliGRAM(s) Oral every 8 hours  insulin glargine Injectable (LANTUS) 10 Unit(s) SubCutaneous at bedtime  insulin lispro (HumaLOG) corrective regimen sliding scale   SubCutaneous three times a day before meals  insulin lispro (HumaLOG) corrective regimen sliding scale   SubCutaneous at bedtime  levothyroxine 175 MICROGram(s) Oral daily  magnesium oxide 400 milliGRAM(s) Oral three times a day with meals  metoprolol succinate ER 12.5 milliGRAM(s) Oral daily  milrinone Infusion 0.5 MICROgram(s)/kG/Min (12.72 mL/Hr) IV Continuous <Continuous>  rifaximin 550 milliGRAM(s) Oral two times a day  spironolactone 25 milliGRAM(s) Oral two times a day  vancomycin  IVPB 1000 milliGRAM(s) IV Intermittent once      Allergies    Isordil (Headache)  penicillin (Rash)    Intolerances        FAMILY HISTORY:  No pertinent family history in first degree relatives    Non-contributary for premature coronary disease or sudden cardiac death    SOCIAL HISTORY:    [X ] Non-smoker  [ ] Smoker  [ ] Alcohol      REVIEW OF SYSTEMS:  [ ]chest pain  [ X ]shortness of breath  [  ]palpitations  [  ]syncope  [ ]near syncope [ ]upper extremity weakness   [ ] lower extremity weakness  [  ]diplopia  [  ]altered mental status   [  ]fevers  [ ]chills [x ]nausea  [ ]vomitting  [  ]dysphagia    [ ]abdominal pain  [ ]melena  [ ]BRBPR    [  ]epistaxis  [  ]rash    [ X]lower extremity edema        [X ] All others negative	  [ ] Unable to obtain      LABS:	 	    CARDIAC MARKERS:                              9.0    9.85  )-----------( 218      ( 28 Feb 2019 09:29 )             27.0     Hb Trend: 9.0<--, 10.9<--    02-28    123<L>  |  76<L>  |  34<H>  ----------------------------<  202<H>  4.3   |  26  |  1.40<H>    Ca    6.7<L>      28 Feb 2019 05:00  Phos  4.1     02-28  Mg     1.4     02-28    TPro  7.5  /  Alb  3.5  /  TBili  6.2<H>  /  DBili  x   /  AST  60<H>  /  ALT  28  /  AlkPhos  354<H>  02-28    Creatinine Trend: 1.40<--, 1.30<--, 1.10<--    Coags:  PT/INR - ( 28 Feb 2019 08:19 )   PT: 36.9 sec;   INR: 3.15 ratio         PTT - ( 27 Feb 2019 21:04 )  PTT:29.9 sec    proBNP: Serum Pro-Brain Natriuretic Peptide: 2995 pg/mL (02-27 @ 18:53)    PHYSICAL EXAM:  T(C): 37.2 (02-28-19 @ 09:01), Max: 38.8 (02-28-19 @ 06:15)  HR: 110 (02-28-19 @ 15:01) (104 - 117)  BP: 100/82 (02-28-19 @ 15:01) (89/70 - 136/73)  RR: 15 (02-28-19 @ 15:01) (14 - 24)  SpO2: 98% (02-28-19 @ 15:01) (95% - 100%)  Wt(kg): --  I&O's Summary    28 Feb 2019 07:01  -  28 Feb 2019 15:30  --------------------------------------------------------  IN: 183.5 mL / OUT: 1500 mL / NET: -1316.5 mL        Gen: Appears well in NAD  HEENT:  (-)icterus (-)pallor  CV: N S1 S2 1/6 ROSALINDA (+)2 Pulses B/l  Resp:  Clear to ausculatation B/L, normal effort  GI: (+) BS Soft, NT, ND  Lymph:  (+)Edema, (-)obvious lymphadenopathy  Skin: Warm to touch, Normal turgor  Psych: Appropriate mood and affect        TELEMETRY: 	  V paced, ? Long R-P tach    ECG:  	Sinus Vpaced    RADIOLOGY:         CXR:  no infiltrate     ASSESSMENT/PLAN: 	63y Female  Severe  NICM MDT BIV ICD DM, Thyroid cancer CHF on cont. milrinone infusion via PICC, HTN, DM, who reports a few days of progressively worsening SOB at rest and w/ exertion acute on chronic decompensated systolic heart failure    - Cont milrinone.  - ? Long R- tachycardia interoogate ICD.  Dr. Hallman called  - Bumex gtt to keep net negative  - consider D/C beta blocker until euvolemic    Augusto Grimes MD, FACC  BEEPER [phone]

## 2019-02-28 NOTE — CONSULT NOTE ADULT - SUBJECTIVE AND OBJECTIVE BOX
HPI:  She is a pleasant 64 y/o female PMH severe NICM (LVEF 10-15%) who had a [employer] Biv-ICD implanted at [facility]. A LHC at [facility] was also unremarkable. She was recently admitted in January with a severe decompensation of her NICM and had frequent NSVT at that time. She is on home milrinone.  Ep now consulted for a presumed SVT since admission.  Patient is again admitted with decompensated CHF in the setting of Enterovirus.  She complains of B/L foot/toe pain, LE edema, episodes of vomiting and fevers.  Denies CP, LOC, Palpitations, dizziness.        PAST MEDICAL & SURGICAL HISTORY:  Asthma  CHF (congestive heart failure): with EF of 10% s/p AICD  DM (diabetes mellitus)  HTN (hypertension)  Thyroid ca  AICD (automatic cardioverter/defibrillator) present  S/P thyroidectomy    MEDICATIONS  (STANDING):  aspirin enteric coated 81 milliGRAM(s) Oral daily  buMETAnide Infusion 2 mG/Hr (10 mL/Hr) IV Continuous <Continuous>  calcitriol   Capsule 0.5 MICROGram(s) Oral daily  calcium acetate 667 milliGRAM(s) Oral four times a day with meals  calcium carbonate 1250 mG  + Vitamin D (OsCal 500 + D) 1 Tablet(s) Oral four times a day  cefTRIAXone   IVPB 1 Gram(s) IV Intermittent once  hydrALAZINE 10 milliGRAM(s) Oral every 8 hours  insulin glargine Injectable (LANTUS) 10 Unit(s) SubCutaneous at bedtime  insulin lispro (HumaLOG) corrective regimen sliding scale   SubCutaneous three times a day before meals  insulin lispro (HumaLOG) corrective regimen sliding scale   SubCutaneous at bedtime  levothyroxine 175 MICROGram(s) Oral daily  magnesium oxide 400 milliGRAM(s) Oral three times a day with meals  metoprolol succinate ER 12.5 milliGRAM(s) Oral daily  milrinone Infusion 0.5 MICROgram(s)/kG/Min (12.72 mL/Hr) IV Continuous <Continuous>  rifaximin 550 milliGRAM(s) Oral two times a day  spironolactone 25 milliGRAM(s) Oral two times a day  vancomycin  IVPB 1000 milliGRAM(s) IV Intermittent once      Allergies  Isordil (Headache)  penicillin (Rash)    FAMILY HISTORY:  No pertinent family history in first degree relatives  Noncontributory for premature coronary disease or sudden cardiac death    SOCIAL HISTORY:    [x ] Non-smoker  [ ] Smoker  [ ] Alcohol      REVIEW OF SYSTEMS:  [ ]chest pain  [ x ]shortness of breath  [  ]palpitations  [  ]syncope  [ ]near syncope [ ]upper extremity weakness   [x ] lower extremity weakness  [  ]diplopia  [  ]altered mental status   [ x ]fevers  [ ]chills [ ]nausea  [x ]vomitting  [  ]dysphagia    [ ]abdominal pain  [ ]melena  [ ]BRBPR    [  ]epistaxis  [  ]rash  [x ]lower extremity edema      [x ] All others negative	  [ ] Unable to obtain    PHYSICAL EXAM:  T(C): 37.2 (02-28-19 @ 09:01), Max: 38.8 (02-28-19 @ 06:15)  HR: 108 (02-28-19 @ 16:07) (104 - 117)  BP: 102/76 (02-28-19 @ 16:07) (89/70 - 136/73)  RR: 20 (02-28-19 @ 16:07) (14 - 24)  SpO2: 97% (02-28-19 @ 16:07) (95% - 100%)  	  HEENT:   Normal oral mucosa, PERRL, EOMI	  Lymphatic: No lymphadenopathy , no edema  Cardiovascular: Normal S1 S2, No JVD, No murmurs , Peripheral pulses palpable 2+ bilaterally  Respiratory: Lungs clear to auscultation, normal effort 	  Gastrointestinal:  Soft, Non-tender, + BS	  Skin: No rashes, No ecchymoses, No cyanosis, warm to touch    DIAGNOSTIC DATA: 	      ECG:  	Vpaced    < from: TTE with Doppler (w/Cont) (11.07.18 @ 05:53) >  Conclusions:  1. Tethered mitral valve leaflets with normal opening.  Mitral annular calcification and thickened  mitral leaflet  tips Moderate mitral regurgitation.  2. Calcified trileaflet aortic valve with normal opening.  3. Moderately dilated left atrium.  LA volume index = 43  cc/m2.  4. Eccentric left ventricular hypertrophy (dilated left  ventricle with normal relative wall thickness).  5. Severe global left ventricularsystolic dysfunction.  Endocardial visualization enhanced with intravenous  injection of Ultrasonic Enhancing Agent (Definity). No LV  thrombus.  Septal flattening consistent with right  ventricular overload.  6. Severe diastolic dysfunction with elevated filling  pressures  7. Severe right atrial enlargement.  8. Right ventricular enlargement with decreased right  ventricular systolic function.  A device wire is noted in  the right heart.  9. Dilated tricuspid annulus with malcoaptation of  tricuspid leaflets. Severe tricuspid regurgitation.  *** No previous Echo exam.  ------------------------------------------------------------------------  Confirmed on  11/7/2018 - 16:40:23 by Haley Koch M.D.    < end of copied text >      	  LABS:	 	                        9.9    8.5   )-----------( 181      ( 28 Feb 2019 15:49 )             29.4     123<L>  |  76<L>  |  34<H>  ----------------------------<  202<H>  4.3   |  26  |  1.40<H>    Ca    6.7<L>      28 Feb 2019 05:00  Phos  4.1     02-28  Mg     1.4     02-28    TPro  7.5  /  Alb  3.5  /  TBili  6.2<H>  /  DBili  x   /  AST  60<H>  /  ALT  28  /  AlkPhos  354<H>  02-28    proBNP: Serum Pro-Brain Natriuretic Peptide: 2995 pg/mL (02-27 @ 18:53)    ASSESSMENT/PLAN:   She is a pleasant 64 y/o female PMH severe NICM (LVEF 10-15%) who had a [employer]onic Biv-ICD implanted at [facility]. A LHC at [facility] was also unremarkable. She was recently admitted in January with a severe decompensation of her NICM and had frequent NSVT at that time. She is on home milrinone.  Ep now consulted for a presumed SVT since admission.  Patient is again admitted with decompensated CHF in the setting of Enterovirus.  She complains of B/L foot/toe pain, LE edema, episodes of vomiting and fevers.     --supportive care of her viral illness  --interrogate ICD  --on low dose Toprol as tolerated given hypotension in setting of febrile illness  --CHF management per CCU/CHF  --pain management    will review with Dr Hallman

## 2019-02-28 NOTE — CHART NOTE - NSCHARTNOTEFT_GEN_A_CORE
====================  CCU MIDNIGHT ROUNDS  ====================    DELIA SERRANO  [id number]    ====================  SUMMARY: HPI:  64 yo F h/o Severe  Cardiomyopathy ,DM, Thyroid cancer CHF on cont. milrinone infusion via PICC, HTN, DM, who reports a few days of progressively worsening SOB at rest and w/ exertion as compared to her usual baseline.  Associated w/ bilateral LE non painful edema over the last 2-3 days. Pt. w/ chronic non productive cough at baseline but this has also worsened over the last 2 days. She denies hemoptysis. States that she had a couple episodes of vomiting which occurred after intense coughing. She experienced one episode of emesis yesterday evening after dinner which was described as non bloody mucus. She denies abdominal pain or discomfort. Last BM was this morning normal appearing. Denies CP. Denies pleuritic pain. No reports of fevers, chill, flank or back pain, urinary complaints. Visiting nurse today aware of patient's worsening cough and SOB contacted patients cardiologist and patient was referred to the ED for admission. (27 Feb 2019 21:59)    ====================        ====================  NEW EVENTS:  Excellent U/O on bumex gtt.  ====================        ====================  VITALS (Last 12 hrs):  ====================    T(C): 37.1 (02-28-19 @ 19:00), Max: 37.2 (02-28-19 @ 16:07)  HR: 106 (02-28-19 @ 22:00) (104 - 112)  BP: 98/68 (02-28-19 @ 22:00) (89/70 - 123/80)  RR: 17 (02-28-19 @ 22:00) (13 - 31)  SpO2: 94% (02-28-19 @ 22:00) (94% - 98%)      TELEMETRY: paced        I&O's Summary    28 Feb 2019 07:01  -  28 Feb 2019 23:20  --------------------------------------------------------  IN: 359.7 mL / OUT: 4300 mL / NET: -3940.3 mL        ====================  PLAN:  # ADHF  - c/w milrinone @home dose  - c/w bumex gtt for diuresis; monitor strict I/Os, lytes, weights  - not a candidate for advanced therapies; poorly compliant   - + RVP; continue supportive care and abx  - continue spironolactone, hydralazine, BB  ====================    HEALTH ISSUES - PROBLEM Dx:  Anemia: Anemia  Hyponatremia: Hyponatremia  Hypocalcemia: Hypocalcemia  Coagulopathy: Coagulopathy  Hypothyroidism: Hypothyroidism  DM (diabetes mellitus): DM (diabetes mellitus)  Acute on chronic systolic congestive heart failure: Acute on chronic systolic congestive heart failure        HEALTH ISSUES - R/O PROBLEM Dx:  Abnormal LFTs (liver function tests): R/O Abnormal LFTs (liver function tests)      GERALD Hale PA #[id number]

## 2019-02-28 NOTE — PROGRESS NOTE ADULT - ASSESSMENT
64 yo F h/o Severe  Cardiomyopathy ,DM, Thyroid cancer CHF on cont. milrinone infusion via PICC, HTN, DM, who reports a few days of progressively worsening SOB at rest and w/ exertion as compared to her usual baseline.  Associated w/ bilateral LE non painful edema over the last 2-3 days. Pt. w/ chronic non productive cough at baseline but this has also worsened over the last 2 days. She denies hemoptysis. States that she had a couple episodes of vomiting which occurred after intense coughing. She experienced one episode of emesis yesterday evening after dinner which was described as non bloody mucus. She denies abdominal pain or discomfort. Last BM was this morning normal appearing. Denies CP. Denies pleuritic pain. No reports of fevers, chill, flank or back pain, urinary complaints. Visiting nurse today aware of patient's worsening cough and SOB contacted patients cardiologist and patient was referred to the ED for admission.     Problem/Plan - 1:  ·  Problem: Acute on chronic systolic congestive heart failure.  Plan: Milrinone and Bumex infusion  and PO spironolactone. Heart Failure  team and cardiology helping.      Problem/Plan - 2:  ·  Problem:  Abnormal LFTs (liver function tests).  Plan: Likely sec to Liver congestion . Will check Ammonia level in AM . Will consult Hepatology if AM labs are worse.      Problem/Plan - 3:  ·  Problem: DM (diabetes mellitus).  Plan: Insulin for now. Sugars in good range.      Problem/Plan - 4:  ·  Problem: Hypothyroidism.  Plan: Synthroid home dose.      Problem/Plan - 5:  ·  Problem: Coagulopathy.  Plan: Vitamin K . Sec to liver congestion .      Problem/Plan - 6:  Problem: Hypocalcemia. Plan: Replacing.     Problem/Plan - 7:  ·  Problem: Hyponatremia.  Plan: Renal helping.      Problem/Plan - 8:  ·  Problem: Anemia.  Plan: Watching CBC.      Problem/Plan - 9:  ·  Problem: Fever .  Plan: Likely Viral but has high ESR&CRP. Cultures pending.

## 2019-02-28 NOTE — PROGRESS NOTE ADULT - ASSESSMENT
62 yo F h/o Severe  Cardiomyopathy ,DM, Thyroid cancer CHF on cont. milrinone infusion via PICC, HTN, DM p/w acute on chronic CHF exacerbation with hospital course c/b concern for sepsis.    NEURO:   ZANDRA    CV:  CHF exacerbation  - on milrinone at home, continue home dose  - continue bumex gtt  - hold metolazone per HF recs  - c/w spironolactone  - c/w hydralazine  - c/w metoprolol  - daily weights  - fluid restrict  - f/u HF team recs    Pulm:  - diuresis as above    :  - elevated Cr likely prerenal 2/2 intravascular depletion  - improving Cr, will continue to monitor Cr  - monitor UOP    - hyponatremia likely 2/2 hypervolemic hypovolemia  - continue to monitor      GI:  - DASH diet    ID:  - sepsis with unknown source, possible pna given pt with cough  - cxr  - bcx pending  - ua, ucx pending  - started on vanc/zosyn/ceftriaxone

## 2019-02-28 NOTE — CHART NOTE - NSCHARTNOTEFT_GEN_A_CORE
Nephrology consult      The patient is a deidre 63 year_old female with past medical history of hypertension, diabetes, thyroid cancer, with presumed removal of parathyroid gland, congestive nonischemic cardiomyopathy, congestive heart failure, and pulmonary hypertension who presents with acute decompensated systolic heart failure.    The patient has hypervolemic hyponatremia likely secondary to heart failure.  She also has hypocalcemia likely secondary to the fact that she likely has no parathyroid gland.    Hypomagnesemia  ARF  Hepatitis  RV dysfunction         Endocrine.  Once out of heart failure her synthroid dose may need adjustment  Increase calcitrol to 0.5 and increase the calcium pills to 4 x daily;  Check Phos as well    Renal.   Bumex  gtt and Milrinone;  Hold off on Samsca for now     ID - positive RVP panel.         Sayed Yaneth  [phone].

## 2019-02-28 NOTE — PROGRESS NOTE ADULT - SUBJECTIVE AND OBJECTIVE BOX
INTERVAL HPI/OVERNIGHT EVENTS: I feel better than yesterday.   Vital Signs Last 24 Hrs  T(C): 37.2 (28 Feb 2019 09:01), Max: 38.8 (28 Feb 2019 06:15)  T(F): 99 (28 Feb 2019 09:01), Max: 101.8 (28 Feb 2019 06:15)  HR: 106 (28 Feb 2019 14:00) (104 - 117)  BP: 89/70 (28 Feb 2019 14:10) (89/70 - 136/73)  BP(mean): 82 (28 Feb 2019 14:00) (81 - 86)  RR: 15 (28 Feb 2019 14:10) (14 - 24)  SpO2: 97% (28 Feb 2019 14:00) (95% - 100%)  I&O's Summary    28 Feb 2019 07:01  -  28 Feb 2019 14:34  --------------------------------------------------------  IN: 148.1 mL / OUT: 0 mL / NET: 148.1 mL      MEDICATIONS  (STANDING):  aspirin enteric coated 81 milliGRAM(s) Oral daily  buMETAnide Infusion 2 mG/Hr (10 mL/Hr) IV Continuous <Continuous>  calcitriol   Capsule 0.5 MICROGram(s) Oral daily  calcium acetate 667 milliGRAM(s) Oral four times a day with meals  calcium carbonate 1250 mG  + Vitamin D (OsCal 500 + D) 1 Tablet(s) Oral four times a day  cefTRIAXone   IVPB 1 Gram(s) IV Intermittent once  dextrose 5%. 1000 milliLiter(s) (50 mL/Hr) IV Continuous <Continuous>  dextrose 50% Injectable 12.5 Gram(s) IV Push once  dextrose 50% Injectable 25 Gram(s) IV Push once  dextrose 50% Injectable 25 Gram(s) IV Push once  gabapentin 200 milliGRAM(s) Oral two times a day  hydrALAZINE 10 milliGRAM(s) Oral every 8 hours  insulin glargine Injectable (LANTUS) 10 Unit(s) SubCutaneous at bedtime  insulin lispro (HumaLOG) corrective regimen sliding scale   SubCutaneous three times a day before meals  insulin lispro (HumaLOG) corrective regimen sliding scale   SubCutaneous at bedtime  levothyroxine 175 MICROGram(s) Oral daily  magnesium oxide 400 milliGRAM(s) Oral three times a day with meals  metoprolol succinate ER 12.5 milliGRAM(s) Oral daily  milrinone Infusion 0.5 MICROgram(s)/kG/Min (12.72 mL/Hr) IV Continuous <Continuous>  rifaximin 550 milliGRAM(s) Oral two times a day  spironolactone 25 milliGRAM(s) Oral two times a day  vancomycin  IVPB 1000 milliGRAM(s) IV Intermittent once    MEDICATIONS  (PRN):  dextrose 40% Gel 15 Gram(s) Oral once PRN Blood Glucose LESS THAN 70 milliGRAM(s)/deciliter  glucagon  Injectable 1 milliGRAM(s) IntraMuscular once PRN Glucose LESS THAN 70 milligrams/deciliter  guaiFENesin   Syrup  (Sugar-Free) 200 milliGRAM(s) Oral every 6 hours PRN Cough    LABS:                        9.0    9.85  )-----------( 218      ( 28 Feb 2019 09:29 )             27.0     02-28    123<L>  |  76<L>  |  34<H>  ----------------------------<  202<H>  4.3   |  26  |  1.40<H>    Ca    6.7<L>      28 Feb 2019 05:00  Phos  4.1     02-28  Mg     1.4     02-28    TPro  7.5  /  Alb  3.5  /  TBili  6.2<H>  /  DBili  x   /  AST  60<H>  /  ALT  28  /  AlkPhos  354<H>  02-28    PT/INR - ( 28 Feb 2019 08:19 )   PT: 36.9 sec;   INR: 3.15 ratio         PTT - ( 27 Feb 2019 21:04 )  PTT:29.9 sec    CAPILLARY BLOOD GLUCOSE      POCT Blood Glucose.: 161 mg/dL (28 Feb 2019 12:29)  POCT Blood Glucose.: 184 mg/dL (28 Feb 2019 04:55)  POCT Blood Glucose.: 186 mg/dL (28 Feb 2019 01:02)          REVIEW OF SYSTEMS:  CONSTITUTIONAL:  fever,   EYES: No eye pain, visual disturbances, or discharge  ENMT:  No difficulty hearing, tinnitus, vertigo; No sinus or throat pain  RESPIRATORY: No cough, wheezing, chills or hemoptysis; No shortness of breath  CARDIOVASCULAR: No chest pain, palpitations, dizziness, or leg swelling  GASTROINTESTINAL: No abdominal or epigastric pain. No nausea, vomiting, or hematemesis; No diarrhea or constipation. No melena or hematochezia.  GENITOURINARY: No dysuria, frequency, hematuria, or incontinence  NEUROLOGICAL: No headaches, memory loss, loss of strength, numbness, or tremors    RADIOLOGY & ADDITIONAL TESTS:    Consultant(s) Notes Reviewed:  [x ] YES  [ ] NO    PHYSICAL EXAM:  GENERAL: NAD, well-groomed, well-developed, not in any distress ,  HEAD:  Atraumatic, Normocephalic  EYES: EOMI, PERRLA, conjunctiva and sclera clear  NECK: Supple, No JVD, Normal thyroid  NERVOUS SYSTEM:  Alert & Oriented X3, No focal deficit   CHEST/LUNG: Good air entry bilateral with no  rales, rhonchi, wheezing, or rubs  HEART: Regular rate and rhythm; No murmurs, rubs, or gallops  ABDOMEN: Soft, Nontender, Nondistended; Bowel sounds present  EXTREMITIES:  2+ Peripheral Pulses, No clubbing, cyanosis, but less  edema    Care Discussed with Consultants/Other Providers [ x] YES  [ ] NO

## 2019-02-28 NOTE — CONSULT NOTE ADULT - ATTENDING COMMENTS
62 yo F well known to our service with NICM, LVEF 10%, inotrope-dependent and poorly compliant with medications, diet, and follow-up. She presents again with acute on chronic systolic HF with evidence of poor end-organ perfusion. Of note, RVP was positive (Coronavirus). She is markedly fluid overloaded (again).    Continue milrinone 0.5 mcg/kg/min for her ACC/AHA Stage D HF along with the bumex infusion.  Would not give metolazone given her hyponatremia.  Keep K > 4.3 with spironolactone.  She is not a candidate for advanced therapies given her poor compliance.

## 2019-02-28 NOTE — PROVIDER CONTACT NOTE (OTHER) - ASSESSMENT
A/Ox4, EKG obtained, V-paced 115 @ time of EKG. Event strip printed and placed in chart. Rectal temp noted 101.8. PT has slight tremors. Denies c/o chest pain, palpitations, and SOB.

## 2019-02-28 NOTE — PROGRESS NOTE ADULT - SUBJECTIVE AND OBJECTIVE BOX
PATIENT:  DELIA SERRANO  [id number]    CHIEF COMPLAINT:  Patient is a 63y old  Female who presents with a chief complaint of Gained weight and sob (28 Feb 2019 03:05)      INTERVAL HISTORY/OVERNIGHT EVENTS:     History of Present Illness:  Reason for Admission: Gained weight and sob	  History of Present Illness: 	  64 yo F h/o Severe  Cardiomyopathy ,DM, Thyroid cancer CHF on cont. milrinone infusion via PICC, HTN, DM, who reports a few days of progressively worsening SOB at rest and w/ exertion as compared to her usual baseline.  Associated w/ bilateral LE non painful edema over the last 2-3 days. Pt. w/ chronic non productive cough at baseline but this has also worsened over the last 2 days. She denies hemoptysis. States that she had a couple episodes of vomiting which occurred after intense coughing. She experienced one episode of emesis yesterday evening after dinner which was described as non bloody mucus. She denies abdominal pain or discomfort. Last BM was this morning normal appearing. Denies CP. Denies pleuritic pain. No reports of fevers, chill, flank or back pain, urinary complaints. Visiting nurse today aware of patient's worsening cough and SOB contacted patients cardiologist and patient was referred to the ED for admission.    Pt was admitted to floors with CHF exacerbation, CCU consulted for concern for sepsis with worsening lactate, tachycardia 140s (resolved spontaneously).        REVIEW OF SYSTEMS:    Constitutional:     [ ] negative [ ] fevers [ ] chills [ ] weight loss [ ] weight gain  HEENT:                  [ ] negative [ ] dry eyes [ ] eye irritation [ ] postnasal drip [ ] nasal congestion  CV:                         [ ] negative  [ ] chest pain [ ] orthopnea [ ] palpitations [ ] murmur  Resp:                     [ ] negative [ ] cough [ ] shortness of breath [ ] dyspnea [ ] wheezing [ ] sputum [ ] hemoptysis  GI:                          [ ] negative [ ] nausea [ ] vomiting [ ] diarrhea [ ] constipation [ ] abd pain [ ] dysphagia   :                        [ ] negative [ ] dysuria [ ] nocturia [ ] hematuria [ ] increased urinary frequency  Musculoskeletal: [ ] negative [ ] back pain [ ] myalgias [ ] arthralgias [ ] fracture  Skin:                       [ ] negative [ ] rash [ ] itch  Neurological:        [ ] negative [ ] headache [ ] dizziness [ ] syncope [ ] weakness [ ] numbness  Psychiatric:           [ ] negative [ ] anxiety [ ] depression  Endocrine:            [ ] negative [ ] diabetes [ ] thyroid problem  Heme/Lymph:      [ ] negative [ ] anemia [ ] bleeding problem  Allergic/Immune: [ ] negative [ ] itchy eyes [ ] nasal discharge [ ] hives [ ] angioedema    [ ] All other systems negative  [ ] Unable to assess ROS because ________.    MEDICATIONS:  MEDICATIONS  (STANDING):  aspirin enteric coated 81 milliGRAM(s) Oral daily  azithromycin  IVPB 500 milliGRAM(s) IV Intermittent once  buMETAnide Infusion 2 mG/Hr (10 mL/Hr) IV Continuous <Continuous>  calcitriol   Capsule 0.25 MICROGram(s) Oral daily  calcium acetate 667 milliGRAM(s) Oral four times a day with meals  cefTRIAXone   IVPB 1 Gram(s) IV Intermittent once  dextrose 5%. 1000 milliLiter(s) (50 mL/Hr) IV Continuous <Continuous>  dextrose 50% Injectable 12.5 Gram(s) IV Push once  dextrose 50% Injectable 25 Gram(s) IV Push once  dextrose 50% Injectable 25 Gram(s) IV Push once  gabapentin 200 milliGRAM(s) Oral two times a day  hydrALAZINE 10 milliGRAM(s) Oral every 8 hours  insulin glargine Injectable (LANTUS) 10 Unit(s) SubCutaneous at bedtime  insulin lispro (HumaLOG) corrective regimen sliding scale   SubCutaneous three times a day before meals  insulin lispro (HumaLOG) corrective regimen sliding scale   SubCutaneous at bedtime  levothyroxine 175 MICROGram(s) Oral daily  magnesium oxide 400 milliGRAM(s) Oral three times a day with meals  metolazone 5 milliGRAM(s) Oral daily  metoprolol succinate ER 12.5 milliGRAM(s) Oral daily  milrinone Infusion 0.5 MICROgram(s)/kG/Min (12.72 mL/Hr) IV Continuous <Continuous>  rifaximin 550 milliGRAM(s) Oral two times a day  spironolactone 25 milliGRAM(s) Oral two times a day  vancomycin  IVPB 1000 milliGRAM(s) IV Intermittent once    MEDICATIONS  (PRN):  dextrose 40% Gel 15 Gram(s) Oral once PRN Blood Glucose LESS THAN 70 milliGRAM(s)/deciliter  glucagon  Injectable 1 milliGRAM(s) IntraMuscular once PRN Glucose LESS THAN 70 milligrams/deciliter      ALLERGIES:  Allergies    Isordil (Headache)  penicillin (Rash)    Intolerances        OBJECTIVE:  ICU Vital Signs Last 24 Hrs  T(C): 37.2 (28 Feb 2019 09:01), Max: 38.8 (28 Feb 2019 06:15)  T(F): 99 (28 Feb 2019 09:01), Max: 101.8 (28 Feb 2019 06:15)  HR: 113 (28 Feb 2019 08:29) (112 - 117)  BP: 101/69 (28 Feb 2019 09:01) (92/55 - 136/73)  BP(mean): 85 (27 Feb 2019 21:59) (85 - 85)  ABP: --  ABP(mean): --  RR: 16 (28 Feb 2019 09:01) (16 - 24)  SpO2: 97% (28 Feb 2019 08:29) (97% - 100%)      Adult Advanced Hemodynamics Last 24 Hrs  CVP(mm Hg): --  CVP(cm H2O): --  CO: --  CI: --  PA: --  PA(mean): --  PCWP: --  SVR: --  SVRI: --  PVR: --  PVRI: --  CAPILLARY BLOOD GLUCOSE      POCT Blood Glucose.: 184 mg/dL (28 Feb 2019 04:55)  POCT Blood Glucose.: 186 mg/dL (28 Feb 2019 01:02)    CAPILLARY BLOOD GLUCOSE      POCT Blood Glucose.: 184 mg/dL (28 Feb 2019 04:55)    I&O's Summary    Daily Height in cm: 170.18 (27 Feb 2019 17:27)    Daily     PHYSICAL EXAMINATION:  General: WN/WD NAD  HEENT: PERRLA, EOMI, moist mucous membranes  Neurology: A&Ox3, nonfocal, ENCARNACION x 4  Respiratory: CTA B/L, normal respiratory effort, no wheezes, crackles, rales  CV: RRR, S1S2, no murmurs, rubs or gallops  Abdominal: Soft, NT, ND +BS, Last BM  Extremities: No edema, + peripheral pulses  Incisions:   Tubes:    LABS:                          10.9   9.2   )-----------( 250      ( 27 Feb 2019 18:53 )             34.0     02-28    123<L>  |  76<L>  |  34<H>  ----------------------------<  202<H>  4.3   |  26  |  1.40<H>    Ca    6.7<L>      28 Feb 2019 05:00  Phos  4.1     02-28  Mg     1.4     02-28    TPro  7.5  /  Alb  3.5  /  TBili  6.2<H>  /  DBili  x   /  AST  60<H>  /  ALT  28  /  AlkPhos  354<H>  02-28    LIVER FUNCTIONS - ( 28 Feb 2019 05:00 )  Alb: 3.5 g/dL / Pro: 7.5 g/dL / ALK PHOS: 354 U/L / ALT: 28 U/L / AST: 60 U/L / GGT: x           PT/INR - ( 28 Feb 2019 08:19 )   PT: 36.9 sec;   INR: 3.15 ratio         PTT - ( 27 Feb 2019 21:04 )  PTT:29.9 sec            TELEMETRY:     EKG:     IMAGING:

## 2019-02-28 NOTE — CHART NOTE - NSCHARTNOTEFT_GEN_A_CORE
MEDICINE NP    DELIA SERRANO  63y Female    Patient is a 63y old  Female who presents with a chief complaint of Gained weight and sob (28 Feb 2019 03:05)       > Event Summary:  Notified by RN, Patient with  [id number] on Tele and seen at bedside.  Patient easily awakened, denies chest pain, sob, palpitations, dizziness, dysuria, abdominal pain, URI symptoms.  +Feverish to touch.  Rectal T-101.8.  Now APaced on Tele, .          > Vital Signs Last 24 Hrs  T(C): 38.8 (28 Feb 2019 06:15), Max: 38.8 (28 Feb 2019 06:15)  T(F): 101.8 (28 Feb 2019 06:15), Max: 101.8 (28 Feb 2019 06:15)  HR: 114 (28 Feb 2019 05:56) (112 - 117)  BP: 98/58 (28 Feb 2019 05:56) (98/58 - 136/73)  BP(mean): 85 (27 Feb 2019 21:59) (85 - 85)  RR: 20 (28 Feb 2019 05:56) (20 - 24)  SpO2: 98% (28 Feb 2019 05:56) (97% - 100%)    > Review Of System:   General:	No fevers. No chills.    Neurological:	 No headache.  No dizziness.   No weakness.   No numbness/tingling.  No B/B incontinence.   Ophthalmologic:  No visual changes. No eye pain  ENMT:  No hearing difficulty. No Rhinorrhea.  No oral lesions or dysphagia.    Respiratory and Thorax:  No cough. No SOB. No hemoptysis  Cardiovascular:	No chest pain. No palpitations.  Gastrointestinal:	 No abdominal pain. No Nausea/ vomiting/ diarrhea.  No hematemesis. No melena.    Genitourinary:  No hematuria, dysuria, frequency or burning.   Musculoskeletal:	 No joint pain.  No limited ROM.  Skin:  No rash. No open lesions.     >LABS  Procalcitonin, Serum (02.28.19 @ 05:00)    Procalcitonin, Serum: 1.92:      Lactate, Blood (02.28.19 @ 05:00)     Lactate, Blood: 2.9 mmol/L      > Physical Assessment:  General: Awake, No acute distress.   Neurology: A&Ox3, nonfocal  CV: +S1S2, RRR.  No peripheral edema  Respiratory: Even, unlabored.  CTA B/L.    Abdomen:  +BS.  Soft, NT, ND.  No palpable mass  MSK: ENCARNACION x4.   Skin: Warm and Dry         > Assessment & Plan:  - HPI:     -Plan:               NADINE Martínez-BC  Medicine Department MEDICINE NP    DELIA SERRANO  63y Female    Patient is a 63y old  Female who presents with a chief complaint of Gained weight and sob (28 Feb 2019 03:05)       > Event Summary:  Notified by RN, Patient with  [id number] on Tele and seen at bedside.  Patient easily awakened, denies chest pain, sob, palpitations, dizziness, dysuria, abdominal pain.   Reports +productive cough w/ yellow phlegm.  +Feverish to touch.  Rectal T-101.8.  Now APaced on Tele, .          > Vital Signs Last 24 Hrs  T(C): 38.8 (28 Feb 2019 06:15), Max: 38.8 (28 Feb 2019 06:15)  T(F): 101.8 (28 Feb 2019 06:15), Max: 101.8 (28 Feb 2019 06:15)  HR: 114 (28 Feb 2019 05:56) (112 - 117)  BP: 98/58 (28 Feb 2019 05:56) (98/58 - 136/73)  BP(mean): 85 (27 Feb 2019 21:59) (85 - 85)  RR: 20 (28 Feb 2019 05:56) (20 - 24)  SpO2: 98% (28 Feb 2019 05:56) (97% - 100%)    > Review Of System:   General:	No fevers. No chills.    Neurological:  No headache.  No dizziness.      Respiratory and Thorax:  +productive cough w/ yellow phlegm. + SOB. No hemoptysis  Cardiovascular: No chest pain. No palpitations.  Gastrointestinal:  No abdominal pain. No Nausea/ vomiting/ diarrhea.     Genitourinary:  No  dysuria, frequency or burning.   Musculoskeletal:	 No joint pain.  No limited ROM.  Skin:  No rash. No open lesions.     >LABS  Procalcitonin, Serum (02.28.19 @ 05:00)    Procalcitonin, Serum: 1.92:      Lactate, Blood (02.28.19 @ 05:00)     Lactate, Blood: 2.9 mmol/L      > Physical Assessment:  General: A&Ox3, nonfocal  CV: +S1S2, RRR.  ++3 b/l LE edema  Respiratory: Even, unlabored.  CTA B/L.    Abdomen:  +BS.  Soft, NT, ND.  No palpable mass  MSK: ENCARNACION x4.   Skin:  +Feverish to touch.          > Assessment & Plan:   HPI:  64 yo F h/o CHF (EF 10%) on cont. milrinone infusion via PICC, HTN, DMT2.--- Presents reports  a few days of progressively worsening SOB at rest and w/ exertion as compared to her usual baseline.  Associated w/ bilateral LE non painful edema over the last 2-3 days. Pt. w/ chronic non productive cough at baseline but this has also worsened over the last 2 days.  Visiting nurse today aware of patient's worsening cough and SOB contacted patients cardiologist and patient was referred to the ED.  Admitted with ADHF on Primacor and Bumex gtt.  Now with Fever / SIRS.     1) Fever - SIRS:  Unclear source  -With uptrending Lactate, and Procalcitonin 1.92  -Tylenol IV and cooling measures prn   -Given patient w/ EF 10% will hold off IVF for now   -f/u RVP, Blood Cx, UA, UCX ordered   -Concern for URI given productive cough, will give CTX 1G x1 now, and Azithromycin 500mg IV x1 now.    -Consider ID in AM   -Trend AM CBC, Lactate, Vitals signs q4  -Consider CT Chest if needed   -D/w Dr. Helton, CCU consult called, c/w ABX, and will se patient.  Awaiting recommendations        2) Arrythmia :  ?SVT on Tele [id number] : Likely 2/2 infection   -Tele strip noted, likely ATach, Patient now back to baseline Atrial Pace  -ECG: APace, 113bpm. No acute changes from prior  -f/u AM BMP /Lytes  -c/w Current regimen  -f/u Cardiology this AM        D/w Attending.    Endorsed to incoming Day RICHARD Rice, NADINE-BC  Medicine Department

## 2019-02-28 NOTE — PROVIDER CONTACT NOTE (OTHER) - ACTION/TREATMENT ORDERED:
EKG performed. NP at bedside. additional labs ordered + BC. IV Tylenol administered for Temp. Bumex IVP x1. Pending BUMEX infusion. Continue to tele monitor.

## 2019-02-28 NOTE — CONSULT NOTE ADULT - SUBJECTIVE AND OBJECTIVE BOX
Patient seen and evaluated at bedside    Chief Complaint:    HPI:  62 yo F h/o Severe  Cardiomyopathy ,DM, Thyroid cancer CHF on cont. milrinone infusion via PICC, HTN, DM, who reports a few days of progressively worsening SOB at rest and w/ exertion as compared to her usual baseline.  Associated w/ bilateral LE non painful edema over the last 2-3 days. Pt. w/ chronic non productive cough at baseline but this has also worsened over the last 2 days. She denies hemoptysis. States that she had a couple episodes of vomiting which occurred after intense coughing. She experienced one episode of emesis yesterday evening after dinner which was described as non bloody mucus. She denies abdominal pain or discomfort. Last BM was this morning normal appearing. Denies CP. Denies pleuritic pain. No reports of fevers, chill, flank or back pain, urinary complaints. Visiting nurse today aware of patient's worsening cough and SOB contacted patients cardiologist and patient was referred to the ED for admission. (27 Feb 2019 21:59)      PMHx:   Asthma  CHF (congestive heart failure)  DM (diabetes mellitus)  HTN (hypertension)  Thyroid ca      PSHx:   AICD (automatic cardioverter/defibrillator) present  S/P thyroidectomy      Allergies:  Isordil (Headache)  penicillin (Rash)      Home Meds:    Current Medications:   aspirin enteric coated 81 milliGRAM(s) Oral daily  buMETAnide 2 milliGRAM(s) Oral two times a day  calcitriol   Capsule 0.25 MICROGram(s) Oral daily  calcium acetate 667 milliGRAM(s) Oral four times a day with meals  calcium carbonate 1250 mG  + Vitamin D (OsCal 500 + D) 1 Tablet(s) Oral two times a day  dextrose 40% Gel 15 Gram(s) Oral once PRN  dextrose 5%. 1000 milliLiter(s) IV Continuous <Continuous>  dextrose 50% Injectable 12.5 Gram(s) IV Push once  dextrose 50% Injectable 25 Gram(s) IV Push once  dextrose 50% Injectable 25 Gram(s) IV Push once  gabapentin 200 milliGRAM(s) Oral two times a day  glucagon  Injectable 1 milliGRAM(s) IntraMuscular once PRN  insulin glargine Injectable (LANTUS) 10 Unit(s) SubCutaneous at bedtime  insulin lispro (HumaLOG) corrective regimen sliding scale   SubCutaneous three times a day before meals  insulin lispro (HumaLOG) corrective regimen sliding scale   SubCutaneous at bedtime  levothyroxine 175 MICROGram(s) Oral daily  metoprolol succinate ER 12.5 milliGRAM(s) Oral daily  milrinone Infusion 0.561 MICROgram(s)/kG/Min IV Continuous <Continuous>  rifaximin 550 milliGRAM(s) Oral two times a day  spironolactone 25 milliGRAM(s) Oral two times a day      FAMILY HISTORY:  No pertinent family history in first degree relatives      Social History:  Smoking History:  Alcohol Use:  Drug Use:    REVIEW OF SYSTEMS:  CONSTITUTIONAL: No weakness, fevers or chills  EYES/ENT: No visual changes;  No dysphagia  NECK: No pain or stiffness  RESPIRATORY: No cough, wheezing, hemoptysis; No shortness of breath  CARDIOVASCULAR: No chest pain or palpitations; No lower extremity edema  GASTROINTESTINAL: No abdominal or epigastric pain. No nausea, vomiting, or hematemesis; No diarrhea or constipation. No melena or hematochezia.  BACK: No back pain  GENITOURINARY: No dysuria, frequency or hematuria  NEUROLOGICAL: No numbness or weakness  SKIN: No itching, burning, rashes, or lesions   All other review of systems is negative unless indicated above.    Physical Exam:  T(F): 98.6 (02-28), Max: 98.6 (02-28)  HR: 115 (02-28) (112 - 116)  BP: 136/73 (02-28) (101/78 - 136/73)  RR: 20 (02-28)  SpO2: 97% (02-28)  GENERAL: No acute distress, well-developed  HEAD:  Atraumatic, Normocephalic  ENT: EOMI, PERRLA, conjunctiva and sclera clear, Neck supple, No JVD, moist mucosa  CHEST/LUNG: Clear to auscultation bilaterally; No wheeze, equal breath sounds bilaterally   BACK: No spinal tenderness  HEART: Regular rate and rhythm; No murmurs, rubs, or gallops  ABDOMEN: Soft, Nontender, Nondistended; Bowel sounds present  EXTREMITIES:  No clubbing, cyanosis, or edema  PSYCH: Nl behavior, nl affect  NEUROLOGY: AAOx3, non-focal, cranial nerves intact  SKIN: Normal color, No rashes or lesions  LINES:    Cardiovascular Diagnostic Testing:    ECG: Personally reviewed:    Echo: Personally reviewed:    Stress Testing:    Cath:    Imaging:    CXR: Personally reviewed    Labs: Personally reviewed                        10.9   9.2   )-----------( 250      ( 27 Feb 2019 18:53 )             34.0     02-27    124<L>  |  76<L>  |  34<H>  ----------------------------<  176<H>  3.7   |  27  |  1.30    Ca    6.4<LL>      27 Feb 2019 21:04    TPro  8.5<H>  /  Alb  3.9  /  TBili  6.3<H>  /  DBili  x   /  AST  89<H>  /  ALT  31  /  AlkPhos  379<H>  02-27    PT/INR - ( 27 Feb 2019 21:04 )   PT: 37.1 sec;   INR: 3.14 ratio         PTT - ( 27 Feb 2019 21:04 )  PTT:29.9 sec    CARDIAC MARKERS ( 27 Feb 2019 22:23 )  31 ng/L / x     / x     / x     / x     / x      CARDIAC MARKERS ( 27 Feb 2019 18:53 )  29 ng/L / x     / x     / x     / x     / x            Serum Pro-Brain Natriuretic Peptide: 2995 pg/mL (02-27 @ 18:53) Patient seen and evaluated at bedside    Chief Complaint: SOB    HPI:  Ms Carson is a 62 yo F with a PMH NICM(EF 10%, LVIDD 6.1cm, dx 2013, milrinone dependent) s/p CRT-D (6/2017), Mod-Severe MR, Severe TR, HTN, HLD, DM, and thyroid CA s/p resection c/b hypoparathyroidism with chronic hypocalcemia who presents for acute on chronic systolic heart failure. Patient followed up with Cardiology 2/19 and at that time was changed from hydralazine to losartan 12.5mg daily. Metolazone increased to 2.5mg three times weekly and bumex 4mg BID. Labs from that visit notable for Na 122, K 2.7, AG 22, Cr 1.32. She was called and instructed to present to ED however patient did not so until today. She complains of SOB, EDDY, LE edema and cough. She notes noncompliance with her diuretics and possibly other medicines. She states she has not interrupted her milrinone.    In ED, P 116, /72, O2 sat 99 on RA, T 36.8. Weight is 84.8kg, it was 75kg on 1/14.CXR normal. Labs with Na 124, K 3.7, Cl 76, BUN/Cr 34/1.3(baseline 0.74), proBNP 2995, venous lactate 2.6.    PMHx:   Asthma  CHF (congestive heart failure)  DM (diabetes mellitus)  HTN (hypertension)  Thyroid ca      PSHx:   AICD (automatic cardioverter/defibrillator) present  S/P thyroidectomy      Allergies:  Isordil (Headache)  penicillin (Rash)      Current Medications:   aspirin enteric coated 81 milliGRAM(s) Oral daily  buMETAnide 2 milliGRAM(s) Oral two times a day  calcitriol   Capsule 0.25 MICROGram(s) Oral daily  calcium acetate 667 milliGRAM(s) Oral four times a day with meals  calcium carbonate 1250 mG  + Vitamin D (OsCal 500 + D) 1 Tablet(s) Oral two times a day  dextrose 40% Gel 15 Gram(s) Oral once PRN  dextrose 5%. 1000 milliLiter(s) IV Continuous <Continuous>  dextrose 50% Injectable 12.5 Gram(s) IV Push once  dextrose 50% Injectable 25 Gram(s) IV Push once  dextrose 50% Injectable 25 Gram(s) IV Push once  gabapentin 200 milliGRAM(s) Oral two times a day  glucagon  Injectable 1 milliGRAM(s) IntraMuscular once PRN  insulin glargine Injectable (LANTUS) 10 Unit(s) SubCutaneous at bedtime  insulin lispro (HumaLOG) corrective regimen sliding scale   SubCutaneous three times a day before meals  insulin lispro (HumaLOG) corrective regimen sliding scale   SubCutaneous at bedtime  levothyroxine 175 MICROGram(s) Oral daily  metoprolol succinate ER 12.5 milliGRAM(s) Oral daily  milrinone Infusion 0.561 MICROgram(s)/kG/Min IV Continuous <Continuous>  rifaximin 550 milliGRAM(s) Oral two times a day  spironolactone 25 milliGRAM(s) Oral two times a day      FAMILY HISTORY:  No pertinent family history in first degree relatives      Social History:  Smoking History: denies  Alcohol Use: denies  Drug Use: denies    REVIEW OF SYSTEMS:  CONSTITUTIONAL: No weakness, fevers or chills  EYES/ENT: No visual changes;  No dysphagia  NECK: No pain or stiffness  RESPIRATORY: + cough, SOB, EDDY  CARDIOVASCULAR: + LE edema, No chest pain or palpitations  GASTROINTESTINAL: No abdominal or epigastric pain. No nausea, vomiting, or hematemesis; No diarrhea or constipation. No melena or hematochezia.  BACK: No back pain  GENITOURINARY: No dysuria, frequency or hematuria  NEUROLOGICAL: No numbness or weakness  SKIN: No itching, burning, rashes, or lesions   All other review of systems is negative unless indicated above.    Physical Exam:  T(F): 98.6 (02-28), Max: 98.6 (02-28)  HR: 115 (02-28) (112 - 116)  BP: 136/73 (02-28) (101/78 - 136/73)  RR: 20 (02-28)  SpO2: 97% (02-28)  GENERAL: No acute distress, obese  HEAD:  Atraumatic, Normocephalic  ENT: EOMI,  conjunctiva and sclera clear, Neck supple, JVD to ear, moist mucosa  CHEST/LUNG: Clear to auscultation bilaterally; No wheeze, equal breath sounds bilaterally   HEART: Regular rate and rhythm; No murmurs, rubs, or gallops  ABDOMEN: Soft, Nontender, Nondistended; Bowel sounds present  EXTREMITIES:  1-2+ edema to knees b/l  PSYCH: Nl behavior, nl affect  NEUROLOGY: AAOx3, non-focal, cranial nerves intact  SKIN: Normal color, No rashes or lesions    Cardiovascular Diagnostic Testing:    Echo: Personally reviewed:  11/7/18  Conclusions:  1. Tethered mitral valve leaflets with normal opening. Mitral annular calcification and thickened  mitral leaflet tips Moderate mitral regurgitation.  2. Calcified trileaflet aortic valve with normal opening.  3. Moderately dilated left atrium.  LA volume index = 43 cc/m2.  4. Eccentric left ventricular hypertrophy (dilated left ventricle with normal relative wall thickness).  5. Severe global left ventricular systolic dysfunction. Endocardial visualization enhanced with intravenous injection of Ultrasonic Enhancing Agent (Definity). No LV thrombus.  Septal flattening consistent with right ventricular overload.  6. Severe diastolic dysfunction with elevated filling pressures  7. Severe right atrial enlargement.  8. Right ventricular enlargement with decreased right ventricular systolic function.  A device wire is noted in the right heart.  9. Dilated tricuspid annulus with malcoaptation of tricuspid leaflets. Severe tricuspid regurgitation.  *** No previous Echo exam.    Cath:  1/8/14  CORONARY VESSELS: The coronary circulation is co-dominant.  LM:   -- LM: Normal.  LAD:   --  LAD: Normal.  CX:   --  Circumflex: Normal.  RCA:   --  RCA: Normal.    Imaging:    CXR: Personally reviewed    Labs: Personally reviewed                        10.9   9.2   )-----------( 250      ( 27 Feb 2019 18:53 )             34.0     02-27    124<L>  |  76<L>  |  34<H>  ----------------------------<  176<H>  3.7   |  27  |  1.30    Ca    6.4<LL>      27 Feb 2019 21:04    TPro  8.5<H>  /  Alb  3.9  /  TBili  6.3<H>  /  DBili  x   /  AST  89<H>  /  ALT  31  /  AlkPhos  379<H>  02-27    PT/INR - ( 27 Feb 2019 21:04 )   PT: 37.1 sec;   INR: 3.14 ratio     PTT - ( 27 Feb 2019 21:04 )  PTT:29.9 sec    CARDIAC MARKERS ( 27 Feb 2019 22:23 )  31 ng/L / x     / x     / x     / x     / x      CARDIAC MARKERS ( 27 Feb 2019 18:53 )  29 ng/L / x     / x     / x     / x     / x          Serum Pro-Brain Natriuretic Peptide: 2995 pg/mL (02-27 @ 18:53)

## 2019-03-01 DIAGNOSIS — E11.9 TYPE 2 DIABETES MELLITUS WITHOUT COMPLICATIONS: ICD-10-CM

## 2019-03-01 DIAGNOSIS — E87.6 HYPOKALEMIA: ICD-10-CM

## 2019-03-01 DIAGNOSIS — J98.8 OTHER SPECIFIED RESPIRATORY DISORDERS: ICD-10-CM

## 2019-03-01 LAB
ALBUMIN SERPL ELPH-MCNC: 2.9 G/DL — LOW (ref 3.3–5)
ALBUMIN SERPL ELPH-MCNC: 3.2 G/DL — LOW (ref 3.3–5)
ALBUMIN SERPL ELPH-MCNC: 3.3 G/DL — SIGNIFICANT CHANGE UP (ref 3.3–5)
ALBUMIN SERPL ELPH-MCNC: 3.4 G/DL — SIGNIFICANT CHANGE UP (ref 3.3–5)
ALP SERPL-CCNC: 308 U/L — HIGH (ref 40–120)
ALP SERPL-CCNC: 330 U/L — HIGH (ref 40–120)
ALP SERPL-CCNC: 335 U/L — HIGH (ref 40–120)
ALP SERPL-CCNC: 352 U/L — HIGH (ref 40–120)
ALT FLD-CCNC: 26 U/L — SIGNIFICANT CHANGE UP (ref 10–45)
ALT FLD-CCNC: 28 U/L — SIGNIFICANT CHANGE UP (ref 10–45)
ANION GAP SERPL CALC-SCNC: 14 MMOL/L — SIGNIFICANT CHANGE UP (ref 5–17)
ANION GAP SERPL CALC-SCNC: 17 MMOL/L — SIGNIFICANT CHANGE UP (ref 5–17)
ANION GAP SERPL CALC-SCNC: 17 MMOL/L — SIGNIFICANT CHANGE UP (ref 5–17)
ANION GAP SERPL CALC-SCNC: 19 MMOL/L — HIGH (ref 5–17)
APTT BLD: 28.3 SEC — SIGNIFICANT CHANGE UP (ref 27.5–36.3)
AST SERPL-CCNC: 53 U/L — HIGH (ref 10–40)
AST SERPL-CCNC: 56 U/L — HIGH (ref 10–40)
AST SERPL-CCNC: 56 U/L — HIGH (ref 10–40)
AST SERPL-CCNC: 58 U/L — HIGH (ref 10–40)
BILIRUB SERPL-MCNC: 5.3 MG/DL — HIGH (ref 0.2–1.2)
BILIRUB SERPL-MCNC: 5.6 MG/DL — HIGH (ref 0.2–1.2)
BILIRUB SERPL-MCNC: 6.1 MG/DL — HIGH (ref 0.2–1.2)
BILIRUB SERPL-MCNC: 6.4 MG/DL — HIGH (ref 0.2–1.2)
BUN SERPL-MCNC: 27 MG/DL — HIGH (ref 7–23)
BUN SERPL-MCNC: 28 MG/DL — HIGH (ref 7–23)
BUN SERPL-MCNC: 30 MG/DL — HIGH (ref 7–23)
BUN SERPL-MCNC: 32 MG/DL — HIGH (ref 7–23)
CALCIUM SERPL-MCNC: 6.9 MG/DL — LOW (ref 8.4–10.5)
CALCIUM SERPL-MCNC: 7.5 MG/DL — LOW (ref 8.4–10.5)
CALCIUM SERPL-MCNC: 7.6 MG/DL — LOW (ref 8.4–10.5)
CALCIUM SERPL-MCNC: 7.6 MG/DL — LOW (ref 8.4–10.5)
CHLORIDE SERPL-SCNC: 75 MMOL/L — LOW (ref 96–108)
CHLORIDE SERPL-SCNC: 75 MMOL/L — LOW (ref 96–108)
CHLORIDE SERPL-SCNC: 76 MMOL/L — LOW (ref 96–108)
CHLORIDE SERPL-SCNC: 76 MMOL/L — LOW (ref 96–108)
CO2 SERPL-SCNC: 31 MMOL/L — SIGNIFICANT CHANGE UP (ref 22–31)
CO2 SERPL-SCNC: 32 MMOL/L — HIGH (ref 22–31)
CO2 SERPL-SCNC: 32 MMOL/L — HIGH (ref 22–31)
CO2 SERPL-SCNC: 37 MMOL/L — HIGH (ref 22–31)
CREAT SERPL-MCNC: 1.14 MG/DL — SIGNIFICANT CHANGE UP (ref 0.5–1.3)
CREAT SERPL-MCNC: 1.2 MG/DL — SIGNIFICANT CHANGE UP (ref 0.5–1.3)
CREAT SERPL-MCNC: 1.24 MG/DL — SIGNIFICANT CHANGE UP (ref 0.5–1.3)
CREAT SERPL-MCNC: 1.27 MG/DL — SIGNIFICANT CHANGE UP (ref 0.5–1.3)
GLUCOSE BLDC GLUCOMTR-MCNC: 184 MG/DL — HIGH (ref 70–99)
GLUCOSE BLDC GLUCOMTR-MCNC: 194 MG/DL — HIGH (ref 70–99)
GLUCOSE BLDC GLUCOMTR-MCNC: 214 MG/DL — HIGH (ref 70–99)
GLUCOSE SERPL-MCNC: 183 MG/DL — HIGH (ref 70–99)
GLUCOSE SERPL-MCNC: 188 MG/DL — HIGH (ref 70–99)
GLUCOSE SERPL-MCNC: 211 MG/DL — HIGH (ref 70–99)
GLUCOSE SERPL-MCNC: 211 MG/DL — HIGH (ref 70–99)
HCT VFR BLD CALC: 30.6 % — LOW (ref 34.5–45)
HGB BLD-MCNC: 10.1 G/DL — LOW (ref 11.5–15.5)
INR BLD: 2.52 RATIO — HIGH (ref 0.88–1.16)
MAGNESIUM SERPL-MCNC: 1.4 MG/DL — LOW (ref 1.6–2.6)
MAGNESIUM SERPL-MCNC: 1.9 MG/DL — SIGNIFICANT CHANGE UP (ref 1.6–2.6)
MAGNESIUM SERPL-MCNC: 2 MG/DL — SIGNIFICANT CHANGE UP (ref 1.6–2.6)
MAGNESIUM SERPL-MCNC: 2.2 MG/DL — SIGNIFICANT CHANGE UP (ref 1.6–2.6)
MCHC RBC-ENTMCNC: 24.2 PG — LOW (ref 27–34)
MCHC RBC-ENTMCNC: 33.1 GM/DL — SIGNIFICANT CHANGE UP (ref 32–36)
MCV RBC AUTO: 73.1 FL — LOW (ref 80–100)
PHOSPHATE SERPL-MCNC: 4.1 MG/DL — SIGNIFICANT CHANGE UP (ref 2.5–4.5)
PHOSPHATE SERPL-MCNC: 4.2 MG/DL — SIGNIFICANT CHANGE UP (ref 2.5–4.5)
PHOSPHATE SERPL-MCNC: 4.6 MG/DL — HIGH (ref 2.5–4.5)
PLATELET # BLD AUTO: 220 K/UL — SIGNIFICANT CHANGE UP (ref 150–400)
POTASSIUM SERPL-MCNC: 3.1 MMOL/L — LOW (ref 3.5–5.3)
POTASSIUM SERPL-MCNC: 3.3 MMOL/L — LOW (ref 3.5–5.3)
POTASSIUM SERPL-MCNC: 3.7 MMOL/L — SIGNIFICANT CHANGE UP (ref 3.5–5.3)
POTASSIUM SERPL-MCNC: 4 MMOL/L — SIGNIFICANT CHANGE UP (ref 3.5–5.3)
POTASSIUM SERPL-SCNC: 3.1 MMOL/L — LOW (ref 3.5–5.3)
POTASSIUM SERPL-SCNC: 3.3 MMOL/L — LOW (ref 3.5–5.3)
POTASSIUM SERPL-SCNC: 3.7 MMOL/L — SIGNIFICANT CHANGE UP (ref 3.5–5.3)
POTASSIUM SERPL-SCNC: 4 MMOL/L — SIGNIFICANT CHANGE UP (ref 3.5–5.3)
PROT SERPL-MCNC: 7.1 G/DL — SIGNIFICANT CHANGE UP (ref 6–8.3)
PROT SERPL-MCNC: 7.4 G/DL — SIGNIFICANT CHANGE UP (ref 6–8.3)
PROT SERPL-MCNC: 7.6 G/DL — SIGNIFICANT CHANGE UP (ref 6–8.3)
PROT SERPL-MCNC: 7.7 G/DL — SIGNIFICANT CHANGE UP (ref 6–8.3)
PROTHROM AB SERPL-ACNC: 29.8 SEC — HIGH (ref 10–12.9)
RAPID RVP RESULT: SIGNIFICANT CHANGE UP
RBC # BLD: 4.19 M/UL — SIGNIFICANT CHANGE UP (ref 3.8–5.2)
RBC # FLD: 22.1 % — HIGH (ref 10.3–14.5)
RHEUMATOID FACT SERPL-ACNC: <10 IU/ML — SIGNIFICANT CHANGE UP (ref 0–13)
SODIUM SERPL-SCNC: 124 MMOL/L — LOW (ref 135–145)
SODIUM SERPL-SCNC: 125 MMOL/L — LOW (ref 135–145)
SODIUM SERPL-SCNC: 126 MMOL/L — LOW (ref 135–145)
SODIUM SERPL-SCNC: 126 MMOL/L — LOW (ref 135–145)
WBC # BLD: 7.4 K/UL — SIGNIFICANT CHANGE UP (ref 3.8–10.5)
WBC # FLD AUTO: 7.4 K/UL — SIGNIFICANT CHANGE UP (ref 3.8–10.5)

## 2019-03-01 PROCEDURE — 99291 CRITICAL CARE FIRST HOUR: CPT

## 2019-03-01 PROCEDURE — 93010 ELECTROCARDIOGRAM REPORT: CPT

## 2019-03-01 PROCEDURE — 99233 SBSQ HOSP IP/OBS HIGH 50: CPT | Mod: GC

## 2019-03-01 RX ORDER — MAGNESIUM SULFATE 500 MG/ML
2 VIAL (ML) INJECTION ONCE
Qty: 0 | Refills: 0 | Status: COMPLETED | OUTPATIENT
Start: 2019-03-01 | End: 2019-03-01

## 2019-03-01 RX ORDER — POTASSIUM CHLORIDE 20 MEQ
40 PACKET (EA) ORAL ONCE
Qty: 0 | Refills: 0 | Status: COMPLETED | OUTPATIENT
Start: 2019-03-01 | End: 2019-03-01

## 2019-03-01 RX ORDER — APIXABAN 2.5 MG/1
5 TABLET, FILM COATED ORAL EVERY 12 HOURS
Qty: 0 | Refills: 0 | Status: DISCONTINUED | OUTPATIENT
Start: 2019-03-01 | End: 2019-03-04

## 2019-03-01 RX ORDER — POTASSIUM CHLORIDE 20 MEQ
20 PACKET (EA) ORAL
Qty: 0 | Refills: 0 | Status: COMPLETED | OUTPATIENT
Start: 2019-03-01 | End: 2019-03-01

## 2019-03-01 RX ORDER — POTASSIUM CHLORIDE 20 MEQ
20 PACKET (EA) ORAL ONCE
Qty: 0 | Refills: 0 | Status: COMPLETED | OUTPATIENT
Start: 2019-03-01 | End: 2019-03-01

## 2019-03-01 RX ORDER — SPIRONOLACTONE 25 MG/1
25 TABLET, FILM COATED ORAL ONCE
Qty: 0 | Refills: 0 | Status: DISCONTINUED | OUTPATIENT
Start: 2019-03-01 | End: 2019-03-01

## 2019-03-01 RX ORDER — POTASSIUM CHLORIDE 20 MEQ
40 PACKET (EA) ORAL ONCE
Qty: 0 | Refills: 0 | Status: DISCONTINUED | OUTPATIENT
Start: 2019-03-01 | End: 2019-03-01

## 2019-03-01 RX ORDER — MAGNESIUM SULFATE 500 MG/ML
1 VIAL (ML) INJECTION ONCE
Qty: 0 | Refills: 0 | Status: COMPLETED | OUTPATIENT
Start: 2019-03-01 | End: 2019-03-01

## 2019-03-01 RX ORDER — SPIRONOLACTONE 25 MG/1
25 TABLET, FILM COATED ORAL ONCE
Qty: 0 | Refills: 0 | Status: COMPLETED | OUTPATIENT
Start: 2019-03-01 | End: 2019-03-01

## 2019-03-01 RX ORDER — BUMETANIDE 0.25 MG/ML
0.5 INJECTION INTRAMUSCULAR; INTRAVENOUS
Qty: 20 | Refills: 0 | Status: DISCONTINUED | OUTPATIENT
Start: 2019-03-01 | End: 2019-03-02

## 2019-03-01 RX ORDER — POTASSIUM CHLORIDE 20 MEQ
10 PACKET (EA) ORAL
Qty: 0 | Refills: 0 | Status: DISCONTINUED | OUTPATIENT
Start: 2019-03-01 | End: 2019-03-01

## 2019-03-01 RX ORDER — ACETAMINOPHEN 500 MG
650 TABLET ORAL ONCE
Qty: 0 | Refills: 0 | Status: COMPLETED | OUTPATIENT
Start: 2019-03-01 | End: 2019-03-01

## 2019-03-01 RX ORDER — POTASSIUM CHLORIDE 20 MEQ
10 PACKET (EA) ORAL ONCE
Qty: 0 | Refills: 0 | Status: DISCONTINUED | OUTPATIENT
Start: 2019-03-01 | End: 2019-03-01

## 2019-03-01 RX ORDER — HYDRALAZINE HCL 50 MG
20 TABLET ORAL EVERY 8 HOURS
Qty: 0 | Refills: 0 | Status: DISCONTINUED | OUTPATIENT
Start: 2019-03-01 | End: 2019-03-04

## 2019-03-01 RX ADMIN — Medication 667 MILLIGRAM(S): at 21:19

## 2019-03-01 RX ADMIN — BUMETANIDE 10 MG/HR: 0.25 INJECTION INTRAMUSCULAR; INTRAVENOUS at 03:09

## 2019-03-01 RX ADMIN — Medication 667 MILLIGRAM(S): at 17:18

## 2019-03-01 RX ADMIN — Medication 175 MICROGRAM(S): at 05:46

## 2019-03-01 RX ADMIN — Medication 10 MILLIGRAM(S): at 05:46

## 2019-03-01 RX ADMIN — Medication 100 MILLIEQUIVALENT(S): at 01:29

## 2019-03-01 RX ADMIN — SPIRONOLACTONE 25 MILLIGRAM(S): 25 TABLET, FILM COATED ORAL at 17:18

## 2019-03-01 RX ADMIN — Medication 1 TABLET(S): at 05:46

## 2019-03-01 RX ADMIN — Medication 667 MILLIGRAM(S): at 08:29

## 2019-03-01 RX ADMIN — Medication 2: at 08:28

## 2019-03-01 RX ADMIN — SPIRONOLACTONE 25 MILLIGRAM(S): 25 TABLET, FILM COATED ORAL at 14:59

## 2019-03-01 RX ADMIN — Medication 12.5 MILLIGRAM(S): at 05:46

## 2019-03-01 RX ADMIN — Medication 650 MILLIGRAM(S): at 09:44

## 2019-03-01 RX ADMIN — Medication 20 MILLIGRAM(S): at 14:59

## 2019-03-01 RX ADMIN — Medication 2: at 17:21

## 2019-03-01 RX ADMIN — Medication 100 MILLIEQUIVALENT(S): at 08:28

## 2019-03-01 RX ADMIN — Medication 1 TABLET(S): at 23:00

## 2019-03-01 RX ADMIN — MAGNESIUM OXIDE 400 MG ORAL TABLET 400 MILLIGRAM(S): 241.3 TABLET ORAL at 12:11

## 2019-03-01 RX ADMIN — GABAPENTIN 200 MILLIGRAM(S): 400 CAPSULE ORAL at 17:21

## 2019-03-01 RX ADMIN — SPIRONOLACTONE 25 MILLIGRAM(S): 25 TABLET, FILM COATED ORAL at 09:44

## 2019-03-01 RX ADMIN — BUMETANIDE 5 MG/HR: 0.25 INJECTION INTRAMUSCULAR; INTRAVENOUS at 10:18

## 2019-03-01 RX ADMIN — MILRINONE LACTATE 12.72 MICROGRAM(S)/KG/MIN: 1 INJECTION, SOLUTION INTRAVENOUS at 03:11

## 2019-03-01 RX ADMIN — Medication 81 MILLIGRAM(S): at 12:11

## 2019-03-01 RX ADMIN — Medication 667 MILLIGRAM(S): at 12:11

## 2019-03-01 RX ADMIN — Medication 50 GRAM(S): at 03:09

## 2019-03-01 RX ADMIN — MAGNESIUM OXIDE 400 MG ORAL TABLET 400 MILLIGRAM(S): 241.3 TABLET ORAL at 17:18

## 2019-03-01 RX ADMIN — MILRINONE LACTATE 12.72 MICROGRAM(S)/KG/MIN: 1 INJECTION, SOLUTION INTRAVENOUS at 17:21

## 2019-03-01 RX ADMIN — Medication 4: at 12:11

## 2019-03-01 RX ADMIN — INSULIN GLARGINE 10 UNIT(S): 100 INJECTION, SOLUTION SUBCUTANEOUS at 21:20

## 2019-03-01 RX ADMIN — GABAPENTIN 200 MILLIGRAM(S): 400 CAPSULE ORAL at 05:46

## 2019-03-01 RX ADMIN — MAGNESIUM OXIDE 400 MG ORAL TABLET 400 MILLIGRAM(S): 241.3 TABLET ORAL at 08:30

## 2019-03-01 RX ADMIN — Medication 1 TABLET(S): at 12:13

## 2019-03-01 RX ADMIN — Medication 100 MILLIEQUIVALENT(S): at 02:29

## 2019-03-01 RX ADMIN — Medication 650 MILLIGRAM(S): at 10:16

## 2019-03-01 RX ADMIN — Medication 50 MILLIEQUIVALENT(S): at 14:59

## 2019-03-01 RX ADMIN — Medication 20 MILLIGRAM(S): at 21:19

## 2019-03-01 RX ADMIN — Medication 50 MILLIEQUIVALENT(S): at 12:10

## 2019-03-01 RX ADMIN — Medication 40 MILLIEQUIVALENT(S): at 03:09

## 2019-03-01 RX ADMIN — Medication 50 MILLIEQUIVALENT(S): at 09:44

## 2019-03-01 RX ADMIN — CALCITRIOL 0.5 MICROGRAM(S): 0.5 CAPSULE ORAL at 12:11

## 2019-03-01 RX ADMIN — Medication 1 TABLET(S): at 17:19

## 2019-03-01 RX ADMIN — Medication 100 GRAM(S): at 17:18

## 2019-03-01 RX ADMIN — Medication 50 MILLIEQUIVALENT(S): at 21:27

## 2019-03-01 NOTE — DIETITIAN INITIAL EVALUATION ADULT. - ADHERENCE
Pt states she follows a low-sodium diet at home by using no-salt seasonings and purchasing low-sodium products, in addition to watching fluid intake by sucking on sugar-free candy when thirsty, and eating ice rather than drinking water.  Pt is unfamiliar with a consistent carbohydrate diet, however A1c is 6.7% (1/11/19), indicating good control. Pt states she checks her blood sugar once daily in the morning, values are usually 150-165mg/dL, and takes Tradjenta. Pt also weighs herself daily in the am./good

## 2019-03-01 NOTE — DIETITIAN INITIAL EVALUATION ADULT. - OTHER INFO
Pt seen for length of stay on CCU. Pt reports good PO intake, eating most of her meals and good appetite. Pt denies chewing/swallowing difficulty, and nausea/vomiting. Pt reports her last BM was 4 days ago, however is not feeling discomfort. Pt reports her UBW as 170 pounds, however dry wt noted as 165 pounds upon discharge from her last hospital visit. Wt gain attributed to fluid accumulation. Pt was unfamiliar with carbohydrate sources and was amenable to receiving DM diet education, written materials were also provided. Pt seen for length of stay on CCU. Pt reports good PO intake, eating most of her meals and good appetite. Pt denies chewing/swallowing difficulty, and nausea/vomiting. Pt reports her last BM was 4 days ago, however is not feeling discomfort. Pt reports her UBW as 170 pounds, however dry wt noted as 165 pounds upon discharge from her last hospital visit. Wt fluctuations in previous RD notes noted, likely due to fluid shifts. As per chart, pt admitted with medication noncompliance. Wt gain attributed to fluid accumulation. Pt was unfamiliar with carbohydrate sources and was amenable to receiving DM diet education, written materials were also provided.

## 2019-03-01 NOTE — PROGRESS NOTE ADULT - ASSESSMENT
The patient is a 63-year-old female with the past medical history of nonischemic cardiomyopathy, diabetes, presumed parathyroid removal, thyroid cancer presents with coronavirus.  She has an evidence of hyponatremia, hypomagnesemia and hypocalcemia.  The patient also with acute kidney injury; suspect the coronavirus tripped her over into (at minimum)acute right ventricular dysfunction.  Chest x-ray at present is clear.   r dry weight.    1.	Endocrine.  The patient likely had her parathyroid gland removed at the time of the thyroid removal.  Parathyroid hormone historically has been low in light of hypocalcemia.  Increase her calcium pills to four times a day;  She could not tolerate the magnesium oxide 400 mg three times a day.  Hopefully, the Bumex drip and the increased inotrope will help with renal perfusion and the excretion of free water which will long term help with increasing serum sodium.  For now hold off on Samsca.  2.	Infectious Disease.  Supportive care.  The patient with positive RVP panel.  3.	Cardiology.  Milrinone dose per heart failure.  Agree with Bumex drip.    Further workup pending above.

## 2019-03-01 NOTE — DIETITIAN INITIAL EVALUATION ADULT. - NS AS NUTRI INTERV ED CONTENT
The following were discussed with the pt: CHO sources, protein sources, protein-CHO pairing, consistent carb, timing of meals, Glucerna vs Ensure, recommended meal modifications, low-sodium diet, fluid restriction. Written materials provided.

## 2019-03-01 NOTE — PROGRESS NOTE ADULT - SUBJECTIVE AND OBJECTIVE BOX
PATIENT:  DELIA SERRANO  [id number]    CHIEF COMPLAINT:  Patient is a 63y old  Female who presents with a chief complaint of Gained weight and sob (01 Mar 2019 05:49)      HPI:  64 yo F h/o Severe  Cardiomyopathy ,DM, Thyroid cancer CHF on cont. milrinone infusion via PICC, HTN, DM, who reports a few days of progressively worsening SOB at rest and w/ exertion as compared to her usual baseline.  Associated w/ bilateral LE non painful edema over the last 2-3 days. Pt. w/ chronic non productive cough at baseline but this has also worsened over the last 2 days. She denies hemoptysis. States that she had a couple episodes of vomiting which occurred after intense coughing. She experienced one episode of emesis yesterday evening after dinner which was described as non bloody mucus. She denies abdominal pain or discomfort. Last BM was this morning normal appearing. Denies CP. Denies pleuritic pain. No reports of fevers, chill, flank or back pain, urinary complaints. Visiting nurse today aware of patient's worsening cough and SOB contacted patients cardiologist and patient was referred to the ED for admission.    Pt was admitted to floors with CHF exacerbation, CCU consulted for concern for sepsis with worsening lactate, tachycardia 140s (resolved spontaneously).    INTERVAL/OVERNIGHT EVENTS: continued to diurese well. Stopped metolazone per HF team recs.      REVIEW OF SYSTEMS:    Constitutional:     [ ] negative [ ] fevers [ ] chills [ ] weight loss [ ] weight gain  HEENT:                  [ ] negative [ ] dry eyes [ ] eye irritation [ ] postnasal drip [ ] nasal congestion  CV:                         [ ] negative  [ ] chest pain [ ] orthopnea [ ] palpitations [ ] murmur  Resp:                     [ ] negative [ ] cough [ ] shortness of breath [ ] dyspnea [ ] wheezing [ ] sputum [ ] hemoptysis  GI:                          [ ] negative [ ] nausea [ ] vomiting [ ] diarrhea [ ] constipation [ ] abd pain [ ] dysphagia   :                        [ ] negative [ ] dysuria [ ] nocturia [ ] hematuria [ ] increased urinary frequency  Musculoskeletal: [ ] negative [ ] back pain [ ] myalgias [ ] arthralgias [ ] fracture  Skin:                       [ ] negative [ ] rash [ ] itch  Neurological:        [ ] negative [ ] headache [ ] dizziness [ ] syncope [ ] weakness [ ] numbness  Psychiatric:           [ ] negative [ ] anxiety [ ] depression  Endocrine:            [ ] negative [ ] diabetes [ ] thyroid problem  Heme/Lymph:      [ ] negative [ ] anemia [ ] bleeding problem  Allergic/Immune: [ ] negative [ ] itchy eyes [ ] nasal discharge [ ] hives [ ] angioedema    [ ] All other systems negative  [ ] Unable to assess ROS because ________.    MEDICATIONS:  MEDICATIONS  (STANDING):  aspirin enteric coated 81 milliGRAM(s) Oral daily  buMETAnide Infusion 2 mG/Hr (10 mL/Hr) IV Continuous <Continuous>  calcitriol   Capsule 0.5 MICROGram(s) Oral daily  calcium acetate 667 milliGRAM(s) Oral four times a day with meals  calcium carbonate 1250 mG  + Vitamin D (OsCal 500 + D) 1 Tablet(s) Oral four times a day  cefTRIAXone   IVPB 1 Gram(s) IV Intermittent once  dextrose 5%. 1000 milliLiter(s) (50 mL/Hr) IV Continuous <Continuous>  dextrose 50% Injectable 12.5 Gram(s) IV Push once  dextrose 50% Injectable 25 Gram(s) IV Push once  dextrose 50% Injectable 25 Gram(s) IV Push once  gabapentin 200 milliGRAM(s) Oral two times a day  hydrALAZINE 10 milliGRAM(s) Oral every 8 hours  insulin glargine Injectable (LANTUS) 10 Unit(s) SubCutaneous at bedtime  insulin lispro (HumaLOG) corrective regimen sliding scale   SubCutaneous three times a day before meals  insulin lispro (HumaLOG) corrective regimen sliding scale   SubCutaneous at bedtime  levothyroxine 175 MICROGram(s) Oral daily  magnesium oxide 400 milliGRAM(s) Oral three times a day with meals  metoprolol succinate ER 12.5 milliGRAM(s) Oral daily  milrinone Infusion 0.5 MICROgram(s)/kG/Min (12.72 mL/Hr) IV Continuous <Continuous>  rifaximin 550 milliGRAM(s) Oral two times a day  spironolactone 25 milliGRAM(s) Oral two times a day  vancomycin  IVPB 1000 milliGRAM(s) IV Intermittent once    MEDICATIONS  (PRN):  dextrose 40% Gel 15 Gram(s) Oral once PRN Blood Glucose LESS THAN 70 milliGRAM(s)/deciliter  glucagon  Injectable 1 milliGRAM(s) IntraMuscular once PRN Glucose LESS THAN 70 milligrams/deciliter  guaiFENesin   Syrup  (Sugar-Free) 200 milliGRAM(s) Oral every 6 hours PRN Cough      ALLERGIES:  Allergies    Isordil (Headache)  penicillin (Rash)    Intolerances        OBJECTIVE:  ICU Vital Signs Last 24 Hrs  T(C): 37.1 (2019 19:00), Max: 37.6 (2019 08:29)  T(F): 98.7 (2019 19:00), Max: 99.7 (2019 08:29)  HR: 110 (01 Mar 2019 06:00) (94 - 113)  BP: 116/72 (01 Mar 2019 06:00) (89/70 - 123/80)  BP(mean): 81 (01 Mar 2019 06:00) (68 - 95)  ABP: --  ABP(mean): --  RR: 17 (01 Mar 2019 06:00) (13 - 32)  SpO2: 97% (01 Mar 2019 06:00) (92% - 98%)      Adult Advanced Hemodynamics Last 24 Hrs  CVP(mm Hg): --  CVP(cm H2O): --  CO: --  CI: --  PA: --  PA(mean): --  PCWP: --  SVR: --  SVRI: --  PVR: --  PVRI: --  CAPILLARY BLOOD GLUCOSE      POCT Blood Glucose.: 151 mg/dL (2019 21:31)  POCT Blood Glucose.: 169 mg/dL (2019 17:13)  POCT Blood Glucose.: 161 mg/dL (2019 12:29)    CAPILLARY BLOOD GLUCOSE      POCT Blood Glucose.: 151 mg/dL (2019 21:31)    I&O's Summary    2019 07:01  -  01 Mar 2019 07:00  --------------------------------------------------------  IN: 851.3 mL / OUT: 6700 mL / NET: -5848.7 mL      Daily     Daily Weight in k.4 (01 Mar 2019 06:00)    PHYSICAL EXAMINATION:  General: WN/WD NAD  HEENT: PERRLA, EOMI, moist mucous membranes  Neurology: A&Ox3, nonfocal, ENCARNACION x 4  Respiratory: CTA B/L, normal respiratory effort, no wheezes, crackles, rales  CV: RRR, S1S2, no murmurs, rubs or gallops  Abdominal: Soft, NT, ND +BS, Last BM  Extremities: No edema, + peripheral pulses  Incisions:   Tubes:    LABS:                          9.9    8.5   )-----------( 181      ( 2019 15:49 )             29.4     03-    126<L>  |  75<L>  |  28<H>  ----------------------------<  183<H>  3.3<L>   |  32<H>  |  1.27    Ca    7.5<L>      01 Mar 2019 06:25  Phos  4.6     03-  Mg     2.0     -    TPro  7.4  /  Alb  3.3  /  TBili  6.4<H>  /  DBili  x   /  AST  56<H>  /  ALT  26  /  AlkPhos  330<H>  03-    LIVER FUNCTIONS - ( 01 Mar 2019 06:25 )  Alb: 3.3 g/dL / Pro: 7.4 g/dL / ALK PHOS: 330 U/L / ALT: 26 U/L / AST: 56 U/L / GGT: x           PT/INR - ( 01 Mar 2019 06:25 )   PT: 29.8 sec;   INR: 2.52 ratio         PTT - ( 01 Mar 2019 06:25 )  PTT:28.3 sec            TELEMETRY:     EKG:     IMAGING:

## 2019-03-01 NOTE — PROGRESS NOTE ADULT - ATTENDING COMMENTS
Patient seen and examined, agree with above assessment and plan as transcribed above.    - Clinically improved  - Brisk diuresis overnight  - Would decreas Bumex gtt  - CCU care appreciated    Augusto Grimes MD, [facility]  BEEPER [phone]

## 2019-03-01 NOTE — PROGRESS NOTE ADULT - SUBJECTIVE AND OBJECTIVE BOX
NEPHROLOGY-[facility] [phone]        Patient seen and examined in bed.  She remains on Milrinone and Bumex gtt   She feels better        MEDICATIONS  (STANDING):  apixaban 5 milliGRAM(s) Oral every 12 hours  aspirin enteric coated 81 milliGRAM(s) Oral daily  buMETAnide Infusion 1 mG/Hr (5 mL/Hr) IV Continuous <Continuous>  calcitriol   Capsule 0.5 MICROGram(s) Oral daily  calcium acetate 667 milliGRAM(s) Oral four times a day with meals  calcium carbonate 1250 mG  + Vitamin D (OsCal 500 + D) 1 Tablet(s) Oral four times a day  dextrose 5%. 1000 milliLiter(s) (50 mL/Hr) IV Continuous <Continuous>  dextrose 50% Injectable 12.5 Gram(s) IV Push once  dextrose 50% Injectable 25 Gram(s) IV Push once  dextrose 50% Injectable 25 Gram(s) IV Push once  gabapentin 200 milliGRAM(s) Oral two times a day  hydrALAZINE 20 milliGRAM(s) Oral every 8 hours  insulin glargine Injectable (LANTUS) 10 Unit(s) SubCutaneous at bedtime  insulin lispro (HumaLOG) corrective regimen sliding scale   SubCutaneous three times a day before meals  insulin lispro (HumaLOG) corrective regimen sliding scale   SubCutaneous at bedtime  levothyroxine 175 MICROGram(s) Oral daily  magnesium oxide 400 milliGRAM(s) Oral three times a day with meals  metoprolol succinate ER 12.5 milliGRAM(s) Oral daily  milrinone Infusion 0.5 MICROgram(s)/kG/Min (12.72 mL/Hr) IV Continuous <Continuous>  potassium chloride  10 mEq/100 mL IVPB 10 milliEquivalent(s) IV Intermittent once  rifaximin 550 milliGRAM(s) Oral two times a day  spironolactone 25 milliGRAM(s) Oral two times a day      VITAL:  T(C): , Max: 37.2 (02-28-19 @ 16:07)  T(F): , Max: 99 (02-28-19 @ 16:07)  HR: 94 (03-01-19 @ 14:00)  BP: 86/62 (03-01-19 @ 14:00)  BP(mean): 73 (03-01-19 @ 14:00)  RR: 19 (03-01-19 @ 14:00)  SpO2: 95% (03-01-19 @ 14:00)  Wt(kg): --    I and O's:    02-28 @ 07:01  -  03-01 @ 07:00  --------------------------------------------------------  IN: 864 mL / OUT: 7700 mL / NET: -6836 mL    03-01 @ 07:01  -  03-01 @ 15:04  --------------------------------------------------------  IN: 516.2 mL / OUT: 1000 mL / NET: -483.8 mL          PHYSICAL EXAM:    Constitutional: NAD  Neck:  +  JVD  Respiratory: reduced breath sounds  Cardiovascular: S1 and S2  Gastrointestinal: BS+, soft, NT/ND  Extremities: +  peripheral edema  Neurological: A/O x 3, no focal deficits  Psychiatric: Normal mood, normal affect  : No Gustafson  Skin: No rashes  Access: Not applicable    LABS:                        10.1   7.4   )-----------( 220      ( 01 Mar 2019 06:25 )             30.6     03-01    124<L>  |  76<L>  |  27<H>  ----------------------------<  188<H>  4.0   |  31  |  1.20    Ca    7.6<L>      01 Mar 2019 14:04  Phos  4.1     03-01  Mg     1.9     03-01    TPro  7.6  /  Alb  3.2<L>  /  TBili  5.6<H>  /  DBili  x   /  AST  56<H>  /  ALT  26  /  AlkPhos  335<H>  03-01          Urine Studies:          RADIOLOGY & ADDITIONAL STUDIES:  < from: Xray Chest 1 View- PORTABLE-Routine (02.28.19 @ 10:11) >    EXAM:  XR CHEST PORTABLE ROUTINE 1V                            PROCEDURE DATE:  02/28/2019            INTERPRETATION:  A single chest x-ray was obtained on February 28, 2019.    Indication: Shortness of breath. Rule out pneumonia.    Impression:    The heart is enlarged. The lungs are clear. A pacer is in good position.   No change in the appearance of the chest when compared to previous study   done February 27, 2019.                    ELIZABETH WINSTON M.D., ATTENDING RADIOLOGIST  This document has been electronically signed. Feb 28 2019 10:37AM                < end of copied text >

## 2019-03-01 NOTE — PROGRESS NOTE ADULT - SUBJECTIVE AND OBJECTIVE BOX
EP ATTENDING    tele: sinus tach - BiV pacing    no palpitations, no syncope, + dyspnea, + LE edema    apixaban 5 milliGRAM(s) Oral every 12 hours  aspirin enteric coated 81 milliGRAM(s) Oral daily  buMETAnide Infusion 1 mG/Hr IV Continuous <Continuous>  calcitriol   Capsule 0.5 MICROGram(s) Oral daily  calcium acetate 667 milliGRAM(s) Oral four times a day with meals  calcium carbonate 1250 mG  + Vitamin D (OsCal 500 + D) 1 Tablet(s) Oral four times a day  dextrose 40% Gel 15 Gram(s) Oral once PRN  dextrose 5%. 1000 milliLiter(s) IV Continuous <Continuous>  dextrose 50% Injectable 12.5 Gram(s) IV Push once  dextrose 50% Injectable 25 Gram(s) IV Push once  dextrose 50% Injectable 25 Gram(s) IV Push once  gabapentin 200 milliGRAM(s) Oral two times a day  glucagon  Injectable 1 milliGRAM(s) IntraMuscular once PRN  guaiFENesin   Syrup  (Sugar-Free) 200 milliGRAM(s) Oral every 6 hours PRN  hydrALAZINE 20 milliGRAM(s) Oral every 8 hours  insulin glargine Injectable (LANTUS) 10 Unit(s) SubCutaneous at bedtime  insulin lispro (HumaLOG) corrective regimen sliding scale   SubCutaneous three times a day before meals  insulin lispro (HumaLOG) corrective regimen sliding scale   SubCutaneous at bedtime  levothyroxine 175 MICROGram(s) Oral daily  magnesium oxide 400 milliGRAM(s) Oral three times a day with meals  metoprolol succinate ER 12.5 milliGRAM(s) Oral daily  milrinone Infusion 0.5 MICROgram(s)/kG/Min IV Continuous <Continuous>  potassium chloride  10 mEq/100 mL IVPB 10 milliEquivalent(s) IV Intermittent once  potassium chloride  20 mEq/100 mL IVPB 20 milliEquivalent(s) IV Intermittent every 2 hours  rifaximin 550 milliGRAM(s) Oral two times a day  spironolactone 25 milliGRAM(s) Oral two times a day  spironolactone 25 milliGRAM(s) Oral once                            10.1   7.4   )-----------( 220      ( 01 Mar 2019 06:25 )             30.6       03-01    126<L>  |  75<L>  |  28<H>  ----------------------------<  183<H>  3.3<L>   |  32<H>  |  1.27    Ca    7.5<L>      01 Mar 2019 06:25  Phos  4.6     03-01  Mg     2.0     03-01    TPro  7.4  /  Alb  3.3  /  TBili  6.4<H>  /  DBili  x   /  AST  56<H>  /  ALT  26  /  AlkPhos  330<H>  03-01      T(C): 36.8 (03-01-19 @ 08:00), Max: 37.2 (02-28-19 @ 16:07)  HR: 102 (03-01-19 @ 11:00) (94 - 112)  BP: 104/74 (03-01-19 @ 11:00) (89/70 - 123/80)  RR: 17 (03-01-19 @ 11:00) (11 - 32)  SpO2: 97% (03-01-19 @ 11:00) (92% - 98%)  Wt(kg): --    JVP > 20  RRR, no murmurs  CTAB  soft nt, mildly distented  no c/c, + 2 edema    A/P) She is a pleasant 64 y/o female PMH severe NICM (LVEF 10-15%) who had a [employer] BiV-ICD implanted at [facility]. A LHC at [facility] was also unremarkable. She now returns for with a severe decompensation of her NICM (JVP > 12, LE edema and a near 30 lb weight gain). Her device interrogation recently demonstrates excellent RA, RV and LV lead functions, but with frequent episodes of NSVT. She denies palpitations nor high voltage therapy. EP is now called because she was thought "to have SVT" on tele. Review of all tele strips shows only sinus tachycardia, but keep in mind she does have a history of both PAF and NSVT. Review of her EKGs and tele show the rhythm is sinus with adequate BiV-tracking (RBBB in V1, negative in I/L). She hasn't had any more significant NSVT despite milrinone    -medical therapy as per CHF  -supportive care as per CCU  -would resume NOAC for lifelong a/c given PAF with elevated chads-vasc  -a future option includes turning off BiV pacing to see if she responds better  -f/u with [facility] after discharge on Tue March 12th at 9:45am

## 2019-03-01 NOTE — DIETITIAN INITIAL EVALUATION ADULT. - PERTINENT MEDS FT
lantus, humalog sliding scale, spironolactone, calcitriol, calcium carbonate, vitamin D, phoslo, synthroid

## 2019-03-01 NOTE — PROGRESS NOTE ADULT - ATTENDING COMMENTS
Patient is seen and examined with fellow, NP and the CCU house-staff. I agree with the history, physical and the assessment and plan.  c/w diuresis with bumex  one extra dose of aldactone and increase hydralakzine  monitor and replete electrolytes  afib noted on remote tele - will start NOAC  c/w inotropic support

## 2019-03-01 NOTE — PROGRESS NOTE ADULT - PROBLEM SELECTOR PLAN 1
ACC/AHA stage D, Class IV symptoms   decrease bumex GTT to 1 mg/hr  increase hydralazine to 20 mg TID   give extra dose of PO spironolactone 25 mg x 1 dose now.  Continue PO spironolactone 25 mg Q12H.    Cont Toprol XL 12.5 mg daily   cont milrinone 0.5 mcg/kg/hr (home dose)  BMP Q12H; K>4.5, Mg>2   daily standing weights, strict I/Os

## 2019-03-01 NOTE — PROGRESS NOTE ADULT - ASSESSMENT
62 yo F h/o Severe  Cardiomyopathy ,DM, Thyroid cancer CHF on cont. milrinone infusion via PICC, HTN, DM p/w acute on chronic CHF exacerbation with hospital course c/b concern for sepsis.    NEURO:   ZANDRA    CV:  CHF exacerbation  - on milrinone at home, continue home dose  - continue bumex gtt, decreased dose from 2mg/hr to 1mg/hr given UOP 6L/24h  - hold metolazone per HF recs  - hydralazine to increase from 10mg to 20mg per HF  - c/w spironolactone  - c/w metoprolol  - daily weights  - fluid restrict  - f/u HF team recs  - f/u EP to interrogate medtronic biV pacemaker    Pulm:  - diuresis as above    :  - elevated Cr likely prerenal 2/2 intravascular depletion  - improving Cr, will continue to monitor Cr  - monitor UOP    - hyponatremia likely 2/2 hypervolemic hypovolemia  - continue to monitor, improving    GI:  - DASH diet    ID:  - previously with fever, leukocytosis, mildly hypotensive  - clinically improved  - positive for coronavirus  - BCx NGTD from 2/28  - will stop vanc/zosyn/ceftriaxone  - will get repeat RVP per HF recs

## 2019-03-01 NOTE — DIETITIAN INITIAL EVALUATION ADULT. - PERTINENT LABORATORY DATA
(3/1) Na 126, K+ 3.3, BUN 28, Glucose serum 183, Phosphorus 4.6. (1/11/19) HgbA1c 6.7% (3/1) Na 126, K+ 3.3, BUN 28, Glucose serum 183, Phosphorus 4.6. (1/11/19) HgbA1c 6.7%. Finger sticks: 151-214mg/dL.

## 2019-03-01 NOTE — PROGRESS NOTE ADULT - ATTENDING COMMENTS
She had >6L urine output since yesterday morning and is feeling much better today. She remains volume overloaded and c/o bad cough/cold symptoms. RVP was positive for Coronavirus, but given how poorly she feels, would repeat the flu swab.    Continue milrinone 0.5 mcg/kg/min for her ACC/AHA Stage D HF.  Reduce the bumex to 1 mg/hr now and continue spironolactone 25 mg po BID.  Give one extra dose of spironolactone 25 mg now to help her hypokalemia.  Please increase the hydralazine to 20 mg po TID, hold for SBP < 90.  Would not give metolazone given her hyponatremia.  She is not a candidate for advanced therapies given her poor compliance. She had >6L urine output since yesterday morning and is feeling much better today. She remains volume overloaded and c/o bad cough/cold symptoms. RVP was positive for Coronavirus, but given how poorly she feels, would repeat the flu swab.    Continue milrinone 0.5 mcg/kg/min for her ACC/AHA Stage D HF.  Reduce the bumex to 1 mg/hr now and continue spironolactone 25 mg po BID.  Give one extra dose of spironolactone 25 mg now to help her hypokalemia.  Please increase the hydralazine to 20 mg po TID, hold for SBP < 90.  Would not give metolazone given her hyponatremia.  She is not a candidate for advanced therapies given her poor compliance.    Critical Care Time = 60 minutes

## 2019-03-01 NOTE — PROGRESS NOTE ADULT - ASSESSMENT
64 yo F with a PMH NICM(EF 10%, LVIDD 6.1cm, dx 2013, milrinone dependent) s/p CRT-D (6/2017), Mod-Severe MR, Severe TR, HTN, HLD, DM, and thyroid CA s/p resection c/b hypoparathyroidism with chronic hypocalcemia who presented for acute on chronic systolic heart failure due to medication non-compliance.  Currently on responding quite well to IV bumex infusion; however still hypervolemic w/ evidence of elevated filling pressures on exam.     ACC/AHA stage D, Class IV symptoms; ionotrope dependent   wt:  81.4 kg today (yest 85.5kg; dry weight 75 kg)

## 2019-03-01 NOTE — DIETITIAN INITIAL EVALUATION ADULT. - ENERGY NEEDS
Ht: 67 inches Wt: 165.3 pounds (dry), 179 pounds (current, 3/1) Dry BMI: 25.8 kg/m2 IBW: 135 pounds(+/-10%) %%  +1 L/R foot edema. no pressure ulcers documented.      Other Pertinent Information: Per chart, 62 y/o female admitted for CHF exacerbation, fluid overload and cough. Respiratory viral panel was positive for Coronavirus, however flu swab is being repeated.      PMHx: severe cardiomyopathy, T2DM, thyroid cancer, CHF, HTN, s/p AICD. Ht: 67 inches Wt: 165.3 pounds (dry), 179 pounds (current, 3/1) Dry BMI: 25.8 kg/m2 IBW: 135 pounds(+/-10%) %%  +1 L/R foot edema. no pressure ulcers documented.      Other Pertinent Information: Per chart, 64 y/o female admitted for CHF exacerbation, fluid overload, cough, sepsis and tachycardia. Respiratory viral panel was positive for Coronavirus, however flu swab is being repeated.     PMHx: severe cardiomyopathy, T2DM, thyroid cancer, CHF, HTN, s/p AICD.

## 2019-03-01 NOTE — PROGRESS NOTE ADULT - SUBJECTIVE AND OBJECTIVE BOX
Subjective: pt seen and examined, no complaints, ROS - .     aspirin enteric coated 81 milliGRAM(s) Oral daily  buMETAnide Infusion 2 mG/Hr IV Continuous <Continuous>  calcitriol   Capsule 0.5 MICROGram(s) Oral daily  calcium acetate 667 milliGRAM(s) Oral four times a day with meals  calcium carbonate 1250 mG  + Vitamin D (OsCal 500 + D) 1 Tablet(s) Oral four times a day  cefTRIAXone   IVPB 1 Gram(s) IV Intermittent once  dextrose 40% Gel 15 Gram(s) Oral once PRN  dextrose 5%. 1000 milliLiter(s) IV Continuous <Continuous>  dextrose 50% Injectable 12.5 Gram(s) IV Push once  dextrose 50% Injectable 25 Gram(s) IV Push once  dextrose 50% Injectable 25 Gram(s) IV Push once  gabapentin 200 milliGRAM(s) Oral two times a day  glucagon  Injectable 1 milliGRAM(s) IntraMuscular once PRN  guaiFENesin   Syrup  (Sugar-Free) 200 milliGRAM(s) Oral every 6 hours PRN  hydrALAZINE 10 milliGRAM(s) Oral every 8 hours  insulin glargine Injectable (LANTUS) 10 Unit(s) SubCutaneous at bedtime  insulin lispro (HumaLOG) corrective regimen sliding scale   SubCutaneous three times a day before meals  insulin lispro (HumaLOG) corrective regimen sliding scale   SubCutaneous at bedtime  levothyroxine 175 MICROGram(s) Oral daily  magnesium oxide 400 milliGRAM(s) Oral three times a day with meals  metoprolol succinate ER 12.5 milliGRAM(s) Oral daily  milrinone Infusion 0.5 MICROgram(s)/kG/Min IV Continuous <Continuous>  rifaximin 550 milliGRAM(s) Oral two times a day  spironolactone 25 milliGRAM(s) Oral two times a day  vancomycin  IVPB 1000 milliGRAM(s) IV Intermittent once                            9.9    8.5   )-----------( 181      ( 28 Feb 2019 15:49 )             29.4       Hemoglobin: 9.9 g/dL (02-28 @ 15:49)  Hemoglobin: 9.0 g/dL (02-28 @ 09:29)  Hemoglobin: 10.9 g/dL (02-27 @ 18:53)      03-01    125<L>  |  76<L>  |  30<H>  ----------------------------<  211<H>  3.1<L>   |  32<H>  |  1.14    Ca    6.9<L>      01 Mar 2019 00:18  Phos  4.1     02-28  Mg     1.4     03-01    TPro  7.1  /  Alb  2.9<L>  /  TBili  6.1<H>  /  DBili  x   /  AST  58<H>  /  ALT  28  /  AlkPhos  308<H>  03-01    Creatinine Trend: 1.14<--, 1.33<--, 1.40<--, 1.30<--, 1.10<--    COAGS: PT/INR - ( 28 Feb 2019 15:49 )   PT: 37.5 sec;   INR: 3.15 ratio         PTT - ( 28 Feb 2019 15:49 )  PTT:27.0 sec          T(C): 37.1 (02-28-19 @ 19:00), Max: 38.8 (02-28-19 @ 06:15)  HR: 108 (03-01-19 @ 05:00) (94 - 114)  BP: 111/72 (03-01-19 @ 05:00) (89/70 - 123/80)  RR: 17 (03-01-19 @ 05:00) (13 - 32)  SpO2: 92% (03-01-19 @ 05:00) (92% - 98%)  Wt(kg): --    I&O's Summary    28 Feb 2019 07:01  -  01 Mar 2019 05:49  --------------------------------------------------------  IN: 768.6 mL / OUT: 6100 mL / NET: -5331.4 mL      Gen: Appears well in NAD  HEENT:  (-)icterus (-)pallor  CV: N S1 S2 1/6 ROSALINDA (+)2 Pulses B/l  Resp:  Clear to ausculatation B/L, normal effort  GI: (+) BS Soft, NT, ND  Lymph:  (+)Edema, (-)obvious lymphadenopathy  Skin: Warm to touch, Normal turgor  Psych: Appropriate mood and affect        TELEMETRY: 	  V paced     ECG:  	Sinus Vpaced    RADIOLOGY:         CXR:  no infiltrate     ASSESSMENT/PLAN: 	63y Female  Severe  NICM MDT BIV ICD DM, Thyroid cancer CHF on cont. milrinone infusion via PICC, HTN, DM, who reports a few days of progressively worsening SOB at rest and w/ exertion acute on chronic decompensated systolic heart failure    -ASA, statin   - Abx per ID   - keep net neg with IV bumex gtt, diuresing well, monitor creatine ,   - tolerating Primacor   - EPS follow up   - low dose BB , tolerating it well at present ,   - CHF management per CCU   D/W Dr Grimes Subjective: pt seen and examined, no complaints, ROS - .     aspirin enteric coated 81 milliGRAM(s) Oral daily  buMETAnide Infusion 2 mG/Hr IV Continuous <Continuous>  calcitriol   Capsule 0.5 MICROGram(s) Oral daily  calcium acetate 667 milliGRAM(s) Oral four times a day with meals  calcium carbonate 1250 mG  + Vitamin D (OsCal 500 + D) 1 Tablet(s) Oral four times a day  cefTRIAXone   IVPB 1 Gram(s) IV Intermittent once  dextrose 40% Gel 15 Gram(s) Oral once PRN  dextrose 5%. 1000 milliLiter(s) IV Continuous <Continuous>  dextrose 50% Injectable 12.5 Gram(s) IV Push once  dextrose 50% Injectable 25 Gram(s) IV Push once  dextrose 50% Injectable 25 Gram(s) IV Push once  gabapentin 200 milliGRAM(s) Oral two times a day  glucagon  Injectable 1 milliGRAM(s) IntraMuscular once PRN  guaiFENesin   Syrup  (Sugar-Free) 200 milliGRAM(s) Oral every 6 hours PRN  hydrALAZINE 10 milliGRAM(s) Oral every 8 hours  insulin glargine Injectable (LANTUS) 10 Unit(s) SubCutaneous at bedtime  insulin lispro (HumaLOG) corrective regimen sliding scale   SubCutaneous three times a day before meals  insulin lispro (HumaLOG) corrective regimen sliding scale   SubCutaneous at bedtime  levothyroxine 175 MICROGram(s) Oral daily  magnesium oxide 400 milliGRAM(s) Oral three times a day with meals  metoprolol succinate ER 12.5 milliGRAM(s) Oral daily  milrinone Infusion 0.5 MICROgram(s)/kG/Min IV Continuous <Continuous>  rifaximin 550 milliGRAM(s) Oral two times a day  spironolactone 25 milliGRAM(s) Oral two times a day  vancomycin  IVPB 1000 milliGRAM(s) IV Intermittent once                            9.9    8.5   )-----------( 181      ( 28 Feb 2019 15:49 )             29.4       Hemoglobin: 9.9 g/dL (02-28 @ 15:49)  Hemoglobin: 9.0 g/dL (02-28 @ 09:29)  Hemoglobin: 10.9 g/dL (02-27 @ 18:53)      03-01    125<L>  |  76<L>  |  30<H>  ----------------------------<  211<H>  3.1<L>   |  32<H>  |  1.14    Ca    6.9<L>      01 Mar 2019 00:18  Phos  4.1     02-28  Mg     1.4     03-01    TPro  7.1  /  Alb  2.9<L>  /  TBili  6.1<H>  /  DBili  x   /  AST  58<H>  /  ALT  28  /  AlkPhos  308<H>  03-01    Creatinine Trend: 1.14<--, 1.33<--, 1.40<--, 1.30<--, 1.10<--    COAGS: PT/INR - ( 28 Feb 2019 15:49 )   PT: 37.5 sec;   INR: 3.15 ratio         PTT - ( 28 Feb 2019 15:49 )  PTT:27.0 sec          T(C): 37.1 (02-28-19 @ 19:00), Max: 38.8 (02-28-19 @ 06:15)  HR: 108 (03-01-19 @ 05:00) (94 - 114)  BP: 111/72 (03-01-19 @ 05:00) (89/70 - 123/80)  RR: 17 (03-01-19 @ 05:00) (13 - 32)  SpO2: 92% (03-01-19 @ 05:00) (92% - 98%)  Wt(kg): --    I&O's Summary    28 Feb 2019 07:01  -  01 Mar 2019 05:49  --------------------------------------------------------  IN: 768.6 mL / OUT: 6100 mL / NET: -5331.4 mL      Gen: Appears well in NAD  HEENT:  (-)icterus (-)pallor  CV: N S1 S2 1/6 ROSALINDA (+)2 Pulses B/l  Resp:  Clear to ausculatation B/L, normal effort  GI: (+) BS Soft, NT, ND  Lymph:  (+)Edema, (-)obvious lymphadenopathy  Skin: Warm to touch, Normal turgor  Psych: Appropriate mood and affect        TELEMETRY: 	  V paced     ECG:  	Sinus Vpaced    RADIOLOGY:         CXR:  no infiltrate     ASSESSMENT/PLAN: 	63y Female  Severe  NICM MDT BIV ICD DM, Thyroid cancer CHF on cont. milrinone infusion via PICC, HTN, DM, who reports a few days of progressively worsening SOB at rest and w/ exertion acute on chronic decompensated systolic heart failure    -ASA, statin   - Abx per ID   - keep net neg with IV bumex gtt, diuresing well, monitor creatine ,   - tolerating Primacor   - EPS follow up   - low dose BB , tolerating it well at present ,   D/W Dr Grimes

## 2019-03-01 NOTE — PROGRESS NOTE ADULT - SUBJECTIVE AND OBJECTIVE BOX
INTERVAL HPI/OVERNIGHT EVENTS: I feel better.   Vital Signs Last 24 Hrs  T(C): 36.8 (01 Mar 2019 08:00), Max: 37.2 (28 Feb 2019 16:07)  T(F): 98.2 (01 Mar 2019 08:00), Max: 99 (28 Feb 2019 16:07)  HR: 108 (01 Mar 2019 08:00) (94 - 112)  BP: 100/70 (01 Mar 2019 08:00) (89/70 - 123/80)  BP(mean): 81 (01 Mar 2019 08:00) (68 - 95)  RR: 11 (01 Mar 2019 08:00) (11 - 32)  SpO2: 96% (01 Mar 2019 08:00) (92% - 98%)  I&O's Summary    28 Feb 2019 07:01  -  01 Mar 2019 07:00  --------------------------------------------------------  IN: 864 mL / OUT: 7700 mL / NET: -6836 mL    01 Mar 2019 07:01  -  01 Mar 2019 10:25  --------------------------------------------------------  IN: 122.7 mL / OUT: 0 mL / NET: 122.7 mL      MEDICATIONS  (STANDING):  aspirin enteric coated 81 milliGRAM(s) Oral daily  buMETAnide Infusion 1 mG/Hr (5 mL/Hr) IV Continuous <Continuous>  calcitriol   Capsule 0.5 MICROGram(s) Oral daily  calcium acetate 667 milliGRAM(s) Oral four times a day with meals  calcium carbonate 1250 mG  + Vitamin D (OsCal 500 + D) 1 Tablet(s) Oral four times a day  cefTRIAXone   IVPB 1 Gram(s) IV Intermittent once  dextrose 5%. 1000 milliLiter(s) (50 mL/Hr) IV Continuous <Continuous>  dextrose 50% Injectable 12.5 Gram(s) IV Push once  dextrose 50% Injectable 25 Gram(s) IV Push once  dextrose 50% Injectable 25 Gram(s) IV Push once  gabapentin 200 milliGRAM(s) Oral two times a day  hydrALAZINE 20 milliGRAM(s) Oral every 8 hours  insulin glargine Injectable (LANTUS) 10 Unit(s) SubCutaneous at bedtime  insulin lispro (HumaLOG) corrective regimen sliding scale   SubCutaneous three times a day before meals  insulin lispro (HumaLOG) corrective regimen sliding scale   SubCutaneous at bedtime  levothyroxine 175 MICROGram(s) Oral daily  magnesium oxide 400 milliGRAM(s) Oral three times a day with meals  metoprolol succinate ER 12.5 milliGRAM(s) Oral daily  milrinone Infusion 0.5 MICROgram(s)/kG/Min (12.72 mL/Hr) IV Continuous <Continuous>  potassium chloride  10 mEq/100 mL IVPB 10 milliEquivalent(s) IV Intermittent once  potassium chloride  20 mEq/100 mL IVPB 20 milliEquivalent(s) IV Intermittent every 2 hours  rifaximin 550 milliGRAM(s) Oral two times a day  spironolactone 25 milliGRAM(s) Oral two times a day  spironolactone 25 milliGRAM(s) Oral once  vancomycin  IVPB 1000 milliGRAM(s) IV Intermittent once    MEDICATIONS  (PRN):  dextrose 40% Gel 15 Gram(s) Oral once PRN Blood Glucose LESS THAN 70 milliGRAM(s)/deciliter  glucagon  Injectable 1 milliGRAM(s) IntraMuscular once PRN Glucose LESS THAN 70 milligrams/deciliter  guaiFENesin   Syrup  (Sugar-Free) 200 milliGRAM(s) Oral every 6 hours PRN Cough    LABS:                        10.1   7.4   )-----------( 220      ( 01 Mar 2019 06:25 )             30.6     03-01    126<L>  |  75<L>  |  28<H>  ----------------------------<  183<H>  3.3<L>   |  32<H>  |  1.27    Ca    7.5<L>      01 Mar 2019 06:25  Phos  4.6     03-01  Mg     2.0     03-01    TPro  7.4  /  Alb  3.3  /  TBili  6.4<H>  /  DBili  x   /  AST  56<H>  /  ALT  26  /  AlkPhos  330<H>  03-01    PT/INR - ( 01 Mar 2019 06:25 )   PT: 29.8 sec;   INR: 2.52 ratio         PTT - ( 01 Mar 2019 06:25 )  PTT:28.3 sec    CAPILLARY BLOOD GLUCOSE      POCT Blood Glucose.: 184 mg/dL (01 Mar 2019 08:27)  POCT Blood Glucose.: 151 mg/dL (28 Feb 2019 21:31)  POCT Blood Glucose.: 169 mg/dL (28 Feb 2019 17:13)  POCT Blood Glucose.: 161 mg/dL (28 Feb 2019 12:29)          REVIEW OF SYSTEMS:  CONSTITUTIONAL: No fever, weight loss, or fatigue  EYES: No eye pain, visual disturbances, or discharge  ENMT:  No difficulty hearing, tinnitus, vertigo; No sinus or throat pain  NECK: No pain or stiffness  RESPIRATORY: No cough, wheezing, chills or hemoptysis; No shortness of breath  CARDIOVASCULAR: No chest pain, palpitations, dizziness, or leg swelling  GASTROINTESTINAL: No abdominal or epigastric pain. No nausea, vomiting, or hematemesis; No diarrhea or constipation. No melena or hematochezia.  GENITOURINARY: No dysuria, frequency, hematuria, or incontinence  NEUROLOGICAL: No headaches, memory loss, loss of strength, numbness, or tremors      Consultant(s) Notes Reviewed:  [x ] YES  [ ] NO    PHYSICAL EXAM:  GENERAL: NAD, well-groomed, well-developed, not in any distress ,  HEAD:  Atraumatic, Normocephalic  EYES: EOMI, PERRLA, conjunctiva and sclera clear  NECK: JVD++  NERVOUS SYSTEM:  Alert & Oriented X3, No focal deficit   CHEST/LUNG: Good air entry bilateral with no  rales, rhonchi, wheezing, or rubs  HEART: Regular rate and rhythm; No murmurs, rubs, or gallops  ABDOMEN: Soft, Nontender, Nondistended; Bowel sounds present  EXTREMITIES:  2+ Peripheral Pulses, No clubbing, cyanosis, less edema    Care Discussed with Consultants/Other Providers [ x] YES  [ ] NO

## 2019-03-01 NOTE — PROCEDURE NOTE - ADDITIONAL PROCEDURE DETAILS
Indication: Heart Failure  -normal sensing and pacing thresholds  -stable lead impedance  -review of stored data revealed one ventricular high rate episode on 2/22/19 6 beats.   -OptiVol fluid index is below threshold indicating euvolemia, however it has been on an incline since early February.   Per OptiVol observation, there was a possible fluid accumulation 12/21/18- 1/7/19.   -changes made: none    #[id number]

## 2019-03-01 NOTE — DIETITIAN INITIAL EVALUATION ADULT. - ORAL INTAKE PTA
good/Pt reports good appetite and PO intake PTA, with the exception of feeling nauseous and vomiting 1-2 days PTA. Pt states she usually consumes foods like fruit, cereal, turkey sandwiches, soup, fish, vegetables and 1 Ensure daily. Pt has NKFA and took calcitriol, and calcium/vitamin D PTA.

## 2019-03-01 NOTE — PROGRESS NOTE ADULT - SUBJECTIVE AND OBJECTIVE BOX
- no acute overnight events   - feeling better today w/ improved apetite         Medications:  aspirin enteric coated 81 milliGRAM(s) Oral daily  buMETAnide Infusion 1 mG/Hr IV Continuous <Continuous>  calcitriol   Capsule 0.5 MICROGram(s) Oral daily  calcium acetate 667 milliGRAM(s) Oral four times a day with meals  calcium carbonate 1250 mG  + Vitamin D (OsCal 500 + D) 1 Tablet(s) Oral four times a day  cefTRIAXone   IVPB 1 Gram(s) IV Intermittent once  dextrose 40% Gel 15 Gram(s) Oral once PRN  dextrose 5%. 1000 milliLiter(s) IV Continuous <Continuous>  dextrose 50% Injectable 12.5 Gram(s) IV Push once  dextrose 50% Injectable 25 Gram(s) IV Push once  dextrose 50% Injectable 25 Gram(s) IV Push once  gabapentin 200 milliGRAM(s) Oral two times a day  glucagon  Injectable 1 milliGRAM(s) IntraMuscular once PRN  guaiFENesin   Syrup  (Sugar-Free) 200 milliGRAM(s) Oral every 6 hours PRN  hydrALAZINE 20 milliGRAM(s) Oral every 8 hours  insulin glargine Injectable (LANTUS) 10 Unit(s) SubCutaneous at bedtime  insulin lispro (HumaLOG) corrective regimen sliding scale   SubCutaneous three times a day before meals  insulin lispro (HumaLOG) corrective regimen sliding scale   SubCutaneous at bedtime  levothyroxine 175 MICROGram(s) Oral daily  magnesium oxide 400 milliGRAM(s) Oral three times a day with meals  metoprolol succinate ER 12.5 milliGRAM(s) Oral daily  milrinone Infusion 0.5 MICROgram(s)/kG/Min IV Continuous <Continuous>  potassium chloride  10 mEq/100 mL IVPB 10 milliEquivalent(s) IV Intermittent once  potassium chloride  20 mEq/100 mL IVPB 20 milliEquivalent(s) IV Intermittent every 2 hours  rifaximin 550 milliGRAM(s) Oral two times a day  spironolactone 25 milliGRAM(s) Oral two times a day  spironolactone 25 milliGRAM(s) Oral once  vancomycin  IVPB 1000 milliGRAM(s) IV Intermittent once      PAST MEDICAL & SURGICAL HISTORY:  Asthma  CHF (congestive heart failure): with EF of 10% s/p AICD  DM (diabetes mellitus)  HTN (hypertension)  Thyroid ca  AICD (automatic cardioverter/defibrillator) present  S/P thyroidectomy        Vitals:  T(F): 98.2 (03-01), Max: 99 (02-28)  HR: 108 (03-01) (94 - 112)  BP: 100/70 (03-01) (89/70 - 123/80)  RR: 11 (03-01)  SpO2: 96% (03-01)  I&O's Summary    28 Feb 2019 07:01  -  01 Mar 2019 07:00  --------------------------------------------------------  IN: 864 mL / OUT: 7700 mL / NET: -6836 mL    01 Mar 2019 07:01  -  01 Mar 2019 10:04  --------------------------------------------------------  IN: 122.7 mL / OUT: 0 mL / NET: 122.7 mL        Physical Exam:  GENERAL: No acute distress, obese  HEAD:  Atraumatic, Normocephalic  ENT: EOMI,  conjunctiva and sclera clear, Neck supple, JVP>18 cm   CHEST/LUNG:  Bibasilar crackles w/ no wheezes/rhonchi   HEART: Regular rate and rhythm; II/VI holosystolic murmur along LSB; no rubs, or gallops  ABDOMEN: Soft, Nontender, Nondistended; Bowel sounds present  EXTREMITIES:  +BLE pitting edema; improved   PSYCH: Nl behavior, nl affect  NEUROLOGY: AAOx3, non-focal, cranial nerves intact  SKIN: Normal color, No rashes or lesions                        10.1   7.4   )-----------( 220      ( 01 Mar 2019 06:25 )             30.6     03-01    126<L>  |  75<L>  |  28<H>  ----------------------------<  183<H>  3.3<L>   |  32<H>  |  1.27    Ca    7.5<L>      01 Mar 2019 06:25  Phos  4.6     03-01  Mg     2.0     03-01    TPro  7.4  /  Alb  3.3  /  TBili  6.4<H>  /  DBili  x   /  AST  56<H>  /  ALT  26  /  AlkPhos  330<H>  03-01    PT/INR - ( 01 Mar 2019 06:25 )   PT: 29.8 sec;   INR: 2.52 ratio         PTT - ( 01 Mar 2019 06:25 )  PTT:28.3 sec      Serum Pro-Brain Natriuretic Peptide: 2995 pg/mL (02-27 @ 18:53)    Interpretation of Telemetry: sinus rhythm, HR 90s w/ bi-V paced

## 2019-03-02 ENCOUNTER — TRANSCRIPTION ENCOUNTER (OUTPATIENT)
Age: 64
End: 2019-03-02

## 2019-03-02 LAB
ALBUMIN SERPL ELPH-MCNC: 2.9 G/DL — LOW (ref 3.3–5)
ALBUMIN SERPL ELPH-MCNC: 3.4 G/DL — SIGNIFICANT CHANGE UP (ref 3.3–5)
ALP SERPL-CCNC: 345 U/L — HIGH (ref 40–120)
ALP SERPL-CCNC: 346 U/L — HIGH (ref 40–120)
ALT FLD-CCNC: 25 U/L — SIGNIFICANT CHANGE UP (ref 10–45)
ALT FLD-CCNC: 27 U/L — SIGNIFICANT CHANGE UP (ref 10–45)
ANION GAP SERPL CALC-SCNC: 11 MMOL/L — SIGNIFICANT CHANGE UP (ref 5–17)
ANION GAP SERPL CALC-SCNC: 16 MMOL/L — SIGNIFICANT CHANGE UP (ref 5–17)
APTT BLD: 28.5 SEC — SIGNIFICANT CHANGE UP (ref 27.5–36.3)
AST SERPL-CCNC: 49 U/L — HIGH (ref 10–40)
AST SERPL-CCNC: 52 U/L — HIGH (ref 10–40)
BILIRUB SERPL-MCNC: 4.1 MG/DL — HIGH (ref 0.2–1.2)
BILIRUB SERPL-MCNC: 4.9 MG/DL — HIGH (ref 0.2–1.2)
BUN SERPL-MCNC: 25 MG/DL — HIGH (ref 7–23)
BUN SERPL-MCNC: 31 MG/DL — HIGH (ref 7–23)
CALCIUM SERPL-MCNC: 7.3 MG/DL — LOW (ref 8.4–10.5)
CALCIUM SERPL-MCNC: 7.7 MG/DL — LOW (ref 8.4–10.5)
CHLORIDE SERPL-SCNC: 74 MMOL/L — LOW (ref 96–108)
CHLORIDE SERPL-SCNC: 77 MMOL/L — LOW (ref 96–108)
CO2 SERPL-SCNC: 32 MMOL/L — HIGH (ref 22–31)
CO2 SERPL-SCNC: 36 MMOL/L — HIGH (ref 22–31)
CREAT SERPL-MCNC: 1.1 MG/DL — SIGNIFICANT CHANGE UP (ref 0.5–1.3)
CREAT SERPL-MCNC: 1.15 MG/DL — SIGNIFICANT CHANGE UP (ref 0.5–1.3)
GLUCOSE BLDC GLUCOMTR-MCNC: 155 MG/DL — HIGH (ref 70–99)
GLUCOSE BLDC GLUCOMTR-MCNC: 184 MG/DL — HIGH (ref 70–99)
GLUCOSE BLDC GLUCOMTR-MCNC: 186 MG/DL — HIGH (ref 70–99)
GLUCOSE BLDC GLUCOMTR-MCNC: 206 MG/DL — HIGH (ref 70–99)
GLUCOSE SERPL-MCNC: 252 MG/DL — HIGH (ref 70–99)
GLUCOSE SERPL-MCNC: 321 MG/DL — HIGH (ref 70–99)
HCT VFR BLD CALC: 31.1 % — LOW (ref 34.5–45)
HGB BLD-MCNC: 9.8 G/DL — LOW (ref 11.5–15.5)
INR BLD: 2.2 RATIO — HIGH (ref 0.88–1.16)
MAGNESIUM SERPL-MCNC: 1.6 MG/DL — SIGNIFICANT CHANGE UP (ref 1.6–2.6)
MAGNESIUM SERPL-MCNC: 1.9 MG/DL — SIGNIFICANT CHANGE UP (ref 1.6–2.6)
MCHC RBC-ENTMCNC: 23.3 PG — LOW (ref 27–34)
MCHC RBC-ENTMCNC: 31.6 GM/DL — LOW (ref 32–36)
MCV RBC AUTO: 73.7 FL — LOW (ref 80–100)
PHOSPHATE SERPL-MCNC: 4.3 MG/DL — SIGNIFICANT CHANGE UP (ref 2.5–4.5)
PHOSPHATE SERPL-MCNC: 4.8 MG/DL — HIGH (ref 2.5–4.5)
PLATELET # BLD AUTO: 203 K/UL — SIGNIFICANT CHANGE UP (ref 150–400)
POTASSIUM SERPL-MCNC: 3.4 MMOL/L — LOW (ref 3.5–5.3)
POTASSIUM SERPL-MCNC: 3.8 MMOL/L — SIGNIFICANT CHANGE UP (ref 3.5–5.3)
POTASSIUM SERPL-SCNC: 3.4 MMOL/L — LOW (ref 3.5–5.3)
POTASSIUM SERPL-SCNC: 3.8 MMOL/L — SIGNIFICANT CHANGE UP (ref 3.5–5.3)
PROT SERPL-MCNC: 7.3 G/DL — SIGNIFICANT CHANGE UP (ref 6–8.3)
PROT SERPL-MCNC: 7.5 G/DL — SIGNIFICANT CHANGE UP (ref 6–8.3)
PROTHROM AB SERPL-ACNC: 25.7 SEC — HIGH (ref 10–12.9)
RBC # BLD: 4.22 M/UL — SIGNIFICANT CHANGE UP (ref 3.8–5.2)
RBC # FLD: 22.6 % — HIGH (ref 10.3–14.5)
SODIUM SERPL-SCNC: 122 MMOL/L — LOW (ref 135–145)
SODIUM SERPL-SCNC: 124 MMOL/L — LOW (ref 135–145)
WBC # BLD: 7.9 K/UL — SIGNIFICANT CHANGE UP (ref 3.8–10.5)
WBC # FLD AUTO: 7.9 K/UL — SIGNIFICANT CHANGE UP (ref 3.8–10.5)

## 2019-03-02 PROCEDURE — 93010 ELECTROCARDIOGRAM REPORT: CPT

## 2019-03-02 PROCEDURE — 99291 CRITICAL CARE FIRST HOUR: CPT

## 2019-03-02 RX ORDER — MAGNESIUM SULFATE 500 MG/ML
1 VIAL (ML) INJECTION ONCE
Qty: 0 | Refills: 0 | Status: COMPLETED | OUTPATIENT
Start: 2019-03-02 | End: 2019-03-02

## 2019-03-02 RX ORDER — POTASSIUM CHLORIDE 20 MEQ
20 PACKET (EA) ORAL
Qty: 0 | Refills: 0 | Status: COMPLETED | OUTPATIENT
Start: 2019-03-02 | End: 2019-03-02

## 2019-03-02 RX ORDER — MAGNESIUM SULFATE 500 MG/ML
2 VIAL (ML) INJECTION ONCE
Qty: 0 | Refills: 0 | Status: COMPLETED | OUTPATIENT
Start: 2019-03-02 | End: 2019-03-02

## 2019-03-02 RX ORDER — POTASSIUM CHLORIDE 20 MEQ
40 PACKET (EA) ORAL EVERY 4 HOURS
Qty: 0 | Refills: 0 | Status: DISCONTINUED | OUTPATIENT
Start: 2019-03-02 | End: 2019-03-02

## 2019-03-02 RX ORDER — POTASSIUM CHLORIDE 20 MEQ
20 PACKET (EA) ORAL ONCE
Qty: 0 | Refills: 0 | Status: COMPLETED | OUTPATIENT
Start: 2019-03-02 | End: 2019-03-02

## 2019-03-02 RX ORDER — CHLORHEXIDINE GLUCONATE 213 G/1000ML
1 SOLUTION TOPICAL
Qty: 0 | Refills: 0 | Status: DISCONTINUED | OUTPATIENT
Start: 2019-03-02 | End: 2019-03-04

## 2019-03-02 RX ADMIN — Medication 2: at 17:38

## 2019-03-02 RX ADMIN — Medication 1 TABLET(S): at 06:15

## 2019-03-02 RX ADMIN — MAGNESIUM OXIDE 400 MG ORAL TABLET 400 MILLIGRAM(S): 241.3 TABLET ORAL at 08:31

## 2019-03-02 RX ADMIN — Medication 175 MICROGRAM(S): at 06:21

## 2019-03-02 RX ADMIN — Medication 667 MILLIGRAM(S): at 08:31

## 2019-03-02 RX ADMIN — Medication 2: at 12:51

## 2019-03-02 RX ADMIN — Medication 1 TABLET(S): at 12:15

## 2019-03-02 RX ADMIN — Medication 1 TABLET(S): at 23:00

## 2019-03-02 RX ADMIN — MAGNESIUM OXIDE 400 MG ORAL TABLET 400 MILLIGRAM(S): 241.3 TABLET ORAL at 12:14

## 2019-03-02 RX ADMIN — Medication 50 MILLIEQUIVALENT(S): at 23:00

## 2019-03-02 RX ADMIN — Medication 667 MILLIGRAM(S): at 12:13

## 2019-03-02 RX ADMIN — Medication 1 TABLET(S): at 17:33

## 2019-03-02 RX ADMIN — Medication 50 MILLIEQUIVALENT(S): at 05:08

## 2019-03-02 RX ADMIN — CALCITRIOL 0.5 MICROGRAM(S): 0.5 CAPSULE ORAL at 12:14

## 2019-03-02 RX ADMIN — BUMETANIDE 2.5 MG/HR: 0.25 INJECTION INTRAMUSCULAR; INTRAVENOUS at 04:06

## 2019-03-02 RX ADMIN — Medication 81 MILLIGRAM(S): at 12:14

## 2019-03-02 RX ADMIN — Medication 200 MILLIGRAM(S): at 21:13

## 2019-03-02 RX ADMIN — Medication 50 MILLIEQUIVALENT(S): at 21:10

## 2019-03-02 RX ADMIN — Medication 12.5 MILLIGRAM(S): at 06:15

## 2019-03-02 RX ADMIN — SPIRONOLACTONE 25 MILLIGRAM(S): 25 TABLET, FILM COATED ORAL at 06:15

## 2019-03-02 RX ADMIN — Medication 20 MILLIGRAM(S): at 14:34

## 2019-03-02 RX ADMIN — MILRINONE LACTATE 12.72 MICROGRAM(S)/KG/MIN: 1 INJECTION, SOLUTION INTRAVENOUS at 10:00

## 2019-03-02 RX ADMIN — Medication 20 MILLIGRAM(S): at 06:16

## 2019-03-02 RX ADMIN — Medication 50 MILLIEQUIVALENT(S): at 17:58

## 2019-03-02 RX ADMIN — MAGNESIUM OXIDE 400 MG ORAL TABLET 400 MILLIGRAM(S): 241.3 TABLET ORAL at 17:30

## 2019-03-02 RX ADMIN — APIXABAN 5 MILLIGRAM(S): 2.5 TABLET, FILM COATED ORAL at 17:33

## 2019-03-02 RX ADMIN — SPIRONOLACTONE 25 MILLIGRAM(S): 25 TABLET, FILM COATED ORAL at 17:32

## 2019-03-02 RX ADMIN — INSULIN GLARGINE 10 UNIT(S): 100 INJECTION, SOLUTION SUBCUTANEOUS at 21:24

## 2019-03-02 RX ADMIN — Medication 667 MILLIGRAM(S): at 17:31

## 2019-03-02 RX ADMIN — GABAPENTIN 200 MILLIGRAM(S): 400 CAPSULE ORAL at 17:31

## 2019-03-02 RX ADMIN — Medication 200 MILLIGRAM(S): at 12:24

## 2019-03-02 RX ADMIN — GABAPENTIN 200 MILLIGRAM(S): 400 CAPSULE ORAL at 06:15

## 2019-03-02 RX ADMIN — Medication 20 MILLIGRAM(S): at 21:10

## 2019-03-02 RX ADMIN — Medication 2: at 08:35

## 2019-03-02 RX ADMIN — Medication 50 GRAM(S): at 18:21

## 2019-03-02 RX ADMIN — Medication 100 GRAM(S): at 04:11

## 2019-03-02 NOTE — DISCHARGE NOTE ADULT - PATIENT PORTAL LINK FT
You can access the [facility] Patient Portal, offered by [facility], by registering with the following website: [email or web address]

## 2019-03-02 NOTE — DISCHARGE NOTE ADULT - HOSPITAL COURSE
63 F NICM/HFrEF (10%, MDT CRT-D, home milrinone), mod-sev MR, sev TR, AF ([id number], eliquis), HTN, HLD, DM2, thyroid Ca s/p resection c/b hypoparathyroidism w/ chronic hypocalcemia a/w ADHF in setting of med non compliance. renal, EP and cardiology consulted. HF consulted. Tx to CCU 2/28 d/t sepsis s/t coronavirus. 3/1 ICD interr 6 bts 2/22/19, optivol below threshold but on incline since early feb, poss fluid accumulation 12/21/18-1/7/19s/p bumex gtt for diuresis 2/28-3/2. Pt on hydralazine, toprol & spironolactone. 63 F NICM/HFrEF (10%, MDT CRT-D, home milrinone), mod-sev MR, sev TR, AF ([id number], eliquis), HTN, HLD, DM2, thyroid Ca s/p resection c/b hypoparathyroidism w/ chronic hypocalcemia a/w ADHF in setting of med non compliance. renal, EP and cardiology consulted. HF consulted. Tx to CCU 2/28 d/t sepsis s/t coronavirus. 3/1 ICD interr 6 bts 2/22/19, optivol below threshold but on incline since early feb, poss fluid accumulation 12/21/18-1/7/19s/p bumex gtt for diuresis 2/28-3/2. Pt on hydralazine, toprol & spironolactone increased at discharge to 50mg po bid as per renal.  Inpatient stay  remarkable for hyponatremia in the setting of fluid overload now resolved, repeat  BMP  scheduled for 3/8/19 home blood draw Dr Wolfe Sayed to  be notified.  Pt discharged on  Primacor gtt via RUE gtt.  VNS sent for med management, adherence in the setting of polypharmacy 63 F NICM/HFrEF (10%, MDT CRT-D, home milrinone), mod-sev MR, sev TR, AF ([id number], eliquis), HTN, HLD, DM2, thyroid Ca s/p resection c/b hypoparathyroidism w/ chronic hypocalcemia a/w ADHF in setting of med non compliance. renal, EP and cardiology consulted. HF consulted. Tx to CCU 2/28 d/t sepsis s/t coronavirus. 3/1 ICD interr 6 bts 2/22/19, optivol below threshold but on incline since early feb, poss fluid accumulation 12/21/18-1/7/19s/p bumex gtt for diuresis 2/28-3/2. Pt on hydralazine, toprol & spironolactone increased at discharge to 50mg po bid as per renal.  Inpatient stay  remarkable for hyponatremia in the setting of fluid overload now resolved, repeat  BMP  scheduled for 3/8/19 home blood draw Dr Wolfe Sayed to  be notified.  Pt discharged on  Primacor gtt via RUE gtt.  VNS sent for med management, adherence in the setting of polypharmacy.Pt is high risk for readmission   outpatient appointment made for Monday 3/11/19 830 am in heart failure clinic.

## 2019-03-02 NOTE — PROGRESS NOTE ADULT - ATTENDING COMMENTS
EP ATTENDING    Agree with above. Recommend NOAC for lifelong anticoagulation given PAF with elevated chads-vasc.

## 2019-03-02 NOTE — PROGRESS NOTE ADULT - SUBJECTIVE AND OBJECTIVE BOX
Patient denies CP, SOB ,no more nausea, Edema improving    apixaban 5 milliGRAM(s) Oral every 12 hours  aspirin enteric coated 81 milliGRAM(s) Oral daily  calcitriol   Capsule 0.5 MICROGram(s) Oral daily  calcium acetate 667 milliGRAM(s) Oral four times a day with meals  calcium carbonate 1250 mG  + Vitamin D (OsCal 500 + D) 1 Tablet(s) Oral four times a day  dextrose 40% Gel 15 Gram(s) Oral once PRN  dextrose 5%. 1000 milliLiter(s) IV Continuous <Continuous>  dextrose 50% Injectable 12.5 Gram(s) IV Push once  dextrose 50% Injectable 25 Gram(s) IV Push once  dextrose 50% Injectable 25 Gram(s) IV Push once  gabapentin 200 milliGRAM(s) Oral two times a day  glucagon  Injectable 1 milliGRAM(s) IntraMuscular once PRN  guaiFENesin   Syrup  (Sugar-Free) 200 milliGRAM(s) Oral every 6 hours PRN  hydrALAZINE 20 milliGRAM(s) Oral every 8 hours  insulin glargine Injectable (LANTUS) 10 Unit(s) SubCutaneous at bedtime  insulin lispro (HumaLOG) corrective regimen sliding scale   SubCutaneous three times a day before meals  insulin lispro (HumaLOG) corrective regimen sliding scale   SubCutaneous at bedtime  levothyroxine 175 MICROGram(s) Oral daily  magnesium oxide 400 milliGRAM(s) Oral three times a day with meals  metoprolol succinate ER 12.5 milliGRAM(s) Oral daily  milrinone Infusion 0.5 MICROgram(s)/kG/Min IV Continuous <Continuous>  rifaximin 550 milliGRAM(s) Oral two times a day  spironolactone 25 milliGRAM(s) Oral two times a day                            9.8    7.9   )-----------( 203      ( 02 Mar 2019 01:54 )             31.1       Hemoglobin: 9.8 g/dL (03-02 @ 01:54)  Hemoglobin: 10.1 g/dL (03-01 @ 06:25)  Hemoglobin: 9.9 g/dL (02-28 @ 15:49)  Hemoglobin: 9.0 g/dL (02-28 @ 09:29)  Hemoglobin: 10.9 g/dL (02-27 @ 18:53)      03-02    124<L>  |  77<L>  |  31<H>  ----------------------------<  252<H>  3.8   |  36<H>  |  1.15    Ca    7.7<L>      02 Mar 2019 01:54  Phos  4.3     03-02  Mg     1.9     03-02    TPro  7.5  /  Alb  3.4  /  TBili  4.9<H>  /  DBili  x   /  AST  49<H>  /  ALT  25  /  AlkPhos  346<H>  03-02    Creatinine Trend: 1.15<--, 1.24<--, 1.20<--, 1.27<--, 1.14<--, 1.33<--    COAGS: PT/INR - ( 02 Mar 2019 01:54 )   PT: 25.7 sec;   INR: 2.20 ratio         PTT - ( 02 Mar 2019 01:54 )  PTT:28.5 sec          T(C): 37.4 (03-02-19 @ 12:00), Max: 37.4 (03-02-19 @ 12:00)  HR: 106 (03-02-19 @ 14:00) (92 - 106)  BP: 122/85 (03-02-19 @ 14:00) (94/68 - 129/79)  RR: 17 (03-02-19 @ 14:00) (14 - 31)  SpO2: 92% (03-02-19 @ 14:00) (92% - 98%)  Wt(kg): --    I&O's Summary    01 Mar 2019 07:01  -  02 Mar 2019 07:00  --------------------------------------------------------  IN: 1804.6 mL / OUT: 5975 mL / NET: -4170.4 mL    02 Mar 2019 07:01  -  02 Mar 2019 15:11  --------------------------------------------------------  IN: 303.5 mL / OUT: 1600 mL / NET: -1296.5 mL      Gen: Appears well in NAD  HEENT:  (-)icterus (-)pallor  CV: N S1 S2 1/6 ROSALINDA (+)2 Pulses B/l  Resp:  Clear to ausculatation B/L, normal effort  GI: (+) BS Soft, NT, ND  Lymph:  (+)Edema, (-)obvious lymphadenopathy  Skin: Warm to touch, Normal turgor  Psych: Appropriate mood and affect        TELEMETRY: 	  V paced     ECG:  	Sinus Vpaced    RADIOLOGY:         CXR:  no infiltrate     ASSESSMENT/PLAN: 	63y Female  Severe  NICM MDT BIV ICD DM, Thyroid cancer CHF on cont. milrinone infusion via PICC, HTN, DM, who reports a few days of progressively worsening SOB at rest and w/ exertion acute on chronic decompensated systolic heart failure    - cont Eliquis for PAF  - Off Bumex gtt  - PO diuretics  - cont home milrinone gtt  - Advanced CHF therapy per CHF team    Augusto Grimes MD, [facility]  BEEPER [phone]

## 2019-03-02 NOTE — DISCHARGE NOTE ADULT - PLAN OF CARE
remain well compensated Weigh yourself daily.  If you gain 3lbs in 3 days, or 5lbs in a week call your Health Care Provider.  Do not eat or drink foods containing more than 2000mg of salt (sodium) in your diet every day.  Call your Health Care Provider if you have any swelling or increased swelling in your feet, ankles, and/or stomach.  Take all of your medication as directed.  If you become dizzy call your Health Care Provider. perform frequent hand washing with soap and water or use of an alcohol based hand rub. Avoid  touching your eyes, nose and mouth with unclean hands and encourage others to cover their nose and  mouth with a tissue when coughing or sneezing. HgA1C this admission 6.7  Make sure you get your HgA1c checked every three months.  If you take oral diabetes medications, check your blood glucose two times a day.  If you take insulin, check your blood glucose before meals and at bedtime.  It's important not to skip any meals.  Keep a log of your blood glucose results and always take it with you to your doctor appointments.  Keep a list of your current medications including injectables and over the counter medications and bring this medication list with you to all your doctor appointments.  If you have not seen your ophthalmologist this year call for appointment.  Check your feet daily for redness, sores, or openings. Do not self treat. If no improvement in two days call your primary care physician for an appointment.  Low blood sugar (hypoglycemia) is a blood sugar below 70mg/dl. Check your blood sugar if you feel signs/symptoms of hypoglycemia. If your blood sugar is below 70 take 15 grams of carbohydrates (ex 4 oz of apple juice, 3-4 glucose tablets, or 4-6 oz of regular soda) wait 15 minutes and repeat blood sugar to make sure it comes up above 70.  If your blood sugar is above 70 and you are due for a meal, have a meal.  If you are not due for a meal have a snack.  This snack helps keeps your blood sugar at a safe range. take medications as prescribed follow up with renal take synthroid as prescribed follow up with renal   you have blood draw scheduled for Fri 3/8/19

## 2019-03-02 NOTE — PROGRESS NOTE ADULT - ATTENDING COMMENTS
Patient is seen and examined with fellow, NP and the CCU house-staff. I agree with the history, physical and the assessment and plan.  euvolemic on physiical exam  explained the importnace of eliquis in setting of the afib noted on remote telemetry  c/w inotropic support

## 2019-03-02 NOTE — CHART NOTE - NSCHARTNOTEFT_GEN_A_CORE
====================  CCU MIDNIGHT ROUNDS  ====================    DELIA SERRANO  [id number]    ====================  SUMMARY:  ====================        ====================  NEW EVENTS:  ====================        ====================  VITALS (Last 12 hrs):  ====================    T(C): 37.1 (03-02-19 @ 21:00), Max: 37.4 (03-02-19 @ 12:00)  HR: 105 (03-02-19 @ 22:00) (98 - 107)  BP: 132/113 (03-02-19 @ 22:00) (91/61 - 132/113)  BP(mean): 121 (03-02-19 @ 22:00) (69 - 121)  ABP: --  ABP(mean): --  RR: 15 (03-02-19 @ 22:00) (14 - 31)  SpO2: 98% (03-02-19 @ 22:00) (92% - 98%)  Wt(kg): --  CVP(mm Hg): --  CO: --  CI: --  PA: --  PA(mean): --  PA(direct): --  PCWP: --  SVR: --    TELEMETRY:        *BLOOD GAS/ARTERIAL/MIXED/VENOUS  *LACTATE    I&O's Summary    01 Mar 2019 07:01  -  02 Mar 2019 07:00  --------------------------------------------------------  IN: 1804.6 mL / OUT: 5975 mL / NET: -4170.4 mL    02 Mar 2019 07:01  -  02 Mar 2019 23:01  --------------------------------------------------------  IN: 1177.8 mL / OUT: 2400 mL / NET: -1222.2 mL        ====================  PLAN:  ====================    Sunitha AJP-BC/CCU  spectra #[id number]  beeper #[id number] ====================  CCU MIDNIGHT ROUNDS  ====================    DELIA SERRANO  [id number]    ====================  SUMMARY:  ====================    63 F NICM/HFrEF (10%, MDT CRT-D, home milrinone), mod-sev MR, sev TR, AF ([id number] 4, eliquis), HTN, HLD, DM2, thyroid Ca s/p resection c/b hypoparathyroidism w/ chronic hypocalcemia a/w ADHF in setting of med non compliance. Tx to CCU 2/28 d/t sepsis s/t coronavirus. s/p bumex gtt    ====================  NEW EVENTS:  ====================    no events. no complaints. tx to CCU2     ====================  VITALS (Last 12 hrs):  ====================    T(C): 37.1 (03-02-19 @ 21:00), Max: 37.4 (03-02-19 @ 12:00)  HR: 105 (03-02-19 @ 22:00) (98 - 107)  BP: 132/113 (03-02-19 @ 22:00) (91/61 - 132/113)  BP(mean): 121 (03-02-19 @ 22:00) (69 - 121)  RR: 15 (03-02-19 @ 22:00) (14 - 31)  SpO2: 98% (03-02-19 @ 22:00) (92% - 98%)    TELEMETRY: paced     I&O's Summary    01 Mar 2019 07:01  -  02 Mar 2019 07:00  --------------------------------------------------------  IN: 1804.6 mL / OUT: 5975 mL / NET: -4170.4 mL    02 Mar 2019 07:01  -  02 Mar 2019 23:01  --------------------------------------------------------  IN: 1177.8 mL / OUT: 2400 mL / NET: -1222.2 mL    ====================  PLAN:  ====================  - c/w milrinone, hydral, toprol, sprionolactone, strict I&os, daily weights, monitor Cr and lytes, K>4, Mg >2    - monitor Na   - humaira for AF   Sunitha FARIAS-BC/CCU  spectra #[id number]  beeper #[id number]

## 2019-03-02 NOTE — DISCHARGE NOTE ADULT - CARE PROVIDER_API CALL
Rosendo,   Phone: (   )    -  Fax: (   )    -  Follow Up Time: Evelyn Padron)  Cardiovascular Disease; Internal Medicine  [address] [facility], [address]  [place] [address]  Phone: [phone]  Fax: [phone]  Follow Up Time:     Javier Gallego; PhD)  Adv Heart Fail Trnsplnt Cardio; Cardiovascular Disease; Internal Medicine  [address] [place] [address]  Phone: [phone]  Fax: [phone]  Follow Up Time:     Abbi Wolfe)  Internal Medicine; Nephrology  [address] [facility] [address], [address]  [place] [address]  Phone: [phone]  Fax: [phone]  Follow Up Time:

## 2019-03-02 NOTE — DISCHARGE NOTE ADULT - MEDICATION SUMMARY - MEDICATIONS TO TAKE
I will START or STAY ON the medications listed below when I get home from the hospital:    basic metabolic panel to be drawn on Fri 3/8/19 send results to Dr Yaneth Go  phone [phone]  --    BMP blood draw  3/8/19   -- Indication: For Hyponatremia    spironolactone 50 mg oral tablet  -- 1 tab(s) by mouth 2 times a day  -- Indication: For Heart failure    aspirin 81 mg oral delayed release tablet  -- 1 tab(s) by mouth once a day  -- Indication: For Heart failure    apixaban 5 mg oral tablet  -- 1 tab(s) by mouth every 12 hours  -- Indication: For Heart failure    gabapentin 100 mg oral capsule  -- 2 cap(s) by mouth 2 times a day  -- Indication: For Type 2 diabetes mellitus without complication, with long-term current use of insulin    Tradjenta 5 mg oral tablet  -- 1 tab(s) by mouth once a day  -- Indication: For Type 2 diabetes mellitus without complication, with long-term current use of insulin    Metoprolol Succinate ER 25 mg oral tablet, extended release  -- 0.5 tab(s) by mouth once a day  -- Indication: For Heart failure    bumetanide 2 mg oral tablet  -- 2 tab(s) by mouth 2 times a day  -- Indication: For Heart failure    milrinone 200 mcg/mL-D5% intravenous solution  -- 0.5 mcg/kg/min  intravenous continuous     -- Indication: For Heart failure    magnesium oxide 400 mg (241.3 mg elemental magnesium) oral tablet  -- 1 tab(s) by mouth 3 times a day (with meals)  -- Indication: For supplement     rifAXIMin 550 mg oral tablet  -- 1 tab(s) by mouth 2 times a day  -- Indication: For AnTI INFECTION     calcium acetate 667 mg oral tablet  -- 1 tab(s) by mouth 3 times a day   -- Indication: For Hypocalcemia    levothyroxine 175 mcg (0.175 mg) oral tablet  -- 1 tab(s) by mouth once a day  -- Indication: For Hypothyroidism    hydrALAZINE 10 mg oral tablet  -- 2 tab(s) by mouth every 8 hours  -- Indication: For Heart failure    calcium-vitamin D  -- 1 tab(s) by mouth 2 times a day  -- Indication: For Hypocalcemia    calcitriol 0.25 mcg oral capsule  -- 1 cap(s) by mouth once a day  -- Indication: For Hypo

## 2019-03-02 NOTE — PROGRESS NOTE ADULT - SUBJECTIVE AND OBJECTIVE BOX
Subjective: pt seen and examined,   DIuresing well on bumex gtt overnight   Tele stable     apixaban 5 milliGRAM(s) Oral every 12 hours  aspirin enteric coated 81 milliGRAM(s) Oral daily  buMETAnide Infusion 0.5 mG/Hr IV Continuous <Continuous>  calcitriol   Capsule 0.5 MICROGram(s) Oral daily  calcium acetate 667 milliGRAM(s) Oral four times a day with meals  calcium carbonate 1250 mG  + Vitamin D (OsCal 500 + D) 1 Tablet(s) Oral four times a day  dextrose 40% Gel 15 Gram(s) Oral once PRN  dextrose 5%. 1000 milliLiter(s) IV Continuous <Continuous>  dextrose 50% Injectable 12.5 Gram(s) IV Push once  dextrose 50% Injectable 25 Gram(s) IV Push once  dextrose 50% Injectable 25 Gram(s) IV Push once  gabapentin 200 milliGRAM(s) Oral two times a day  glucagon  Injectable 1 milliGRAM(s) IntraMuscular once PRN  guaiFENesin   Syrup  (Sugar-Free) 200 milliGRAM(s) Oral every 6 hours PRN  hydrALAZINE 20 milliGRAM(s) Oral every 8 hours  insulin glargine Injectable (LANTUS) 10 Unit(s) SubCutaneous at bedtime  insulin lispro (HumaLOG) corrective regimen sliding scale   SubCutaneous three times a day before meals  insulin lispro (HumaLOG) corrective regimen sliding scale   SubCutaneous at bedtime  levothyroxine 175 MICROGram(s) Oral daily  magnesium oxide 400 milliGRAM(s) Oral three times a day with meals  metoprolol succinate ER 12.5 milliGRAM(s) Oral daily  milrinone Infusion 0.5 MICROgram(s)/kG/Min IV Continuous <Continuous>  rifaximin 550 milliGRAM(s) Oral two times a day  spironolactone 25 milliGRAM(s) Oral two times a day                            9.8    7.9   )-----------( 203      ( 02 Mar 2019 01:54 )             31.1       Hemoglobin: 9.8 g/dL (03-02 @ 01:54)  Hemoglobin: 10.1 g/dL (03-01 @ 06:25)  Hemoglobin: 9.9 g/dL (02-28 @ 15:49)  Hemoglobin: 9.0 g/dL (02-28 @ 09:29)  Hemoglobin: 10.9 g/dL (02-27 @ 18:53)      03-02    124<L>  |  77<L>  |  31<H>  ----------------------------<  252<H>  3.8   |  36<H>  |  1.15    Ca    7.7<L>      02 Mar 2019 01:54  Phos  4.3     03-02  Mg     1.9     03-02    TPro  7.5  /  Alb  3.4  /  TBili  4.9<H>  /  DBili  x   /  AST  49<H>  /  ALT  25  /  AlkPhos  346<H>  03-02    Creatinine Trend: 1.15<--, 1.24<--, 1.20<--, 1.27<--, 1.14<--, 1.33<--    COAGS: PT/INR - ( 02 Mar 2019 01:54 )   PT: 25.7 sec;   INR: 2.20 ratio         PTT - ( 02 Mar 2019 01:54 )  PTT:28.5 sec          T(C): 36.6 (03-01-19 @ 20:00), Max: 36.8 (03-01-19 @ 08:00)  HR: 98 (03-02-19 @ 05:00) (92 - 108)  BP: 114/74 (03-02-19 @ 05:00) (71/48 - 129/79)  RR: 14 (03-02-19 @ 05:00) (11 - 22)  SpO2: 95% (03-02-19 @ 05:00) (92% - 98%)  Wt(kg): --    I&O's Summary    28 Feb 2019 07:01  -  01 Mar 2019 07:00  --------------------------------------------------------  IN: 864 mL / OUT: 7700 mL / NET: -6836 mL    01 Mar 2019 07:01  -  02 Mar 2019 06:22  --------------------------------------------------------  IN: 1804.6 mL / OUT: 5575 mL / NET: -3770.4 mL      Gen: Appears well in NAD  HEENT:  (-)icterus (-)pallor  CV: N S1 S2 1/6 ROSALINDA (+)2 Pulses B/l  Resp:  Clear to ausculatation B/L, normal effort  GI: (+) BS Soft, NT, ND  Lymph:  (+)Edema, (-)obvious lymphadenopathy  Skin: Warm to touch, Normal turgor  Psych: Appropriate mood and affect        TELEMETRY: 	  V paced     ECG:  	Sinus Vpaced    RADIOLOGY:         CXR:  no infiltrate     ASSESSMENT/PLAN: 	63y Female  Severe  NICM MDT BIV ICD DM, Thyroid cancer CHF on cont. milrinone infusion via PICC, HTN, DM, who reports a few days of progressively worsening SOB.    -interrogation of PPM / AICD noted   -medical therapy as per CHF  -supportive care as per CCU  -would resume NOAC for lifelong a/c given PAF with elevated chads-vasc  -a future option includes turning off BiV pacing to see if she responds better  -f/u with Rosendo after discharge on Tue March 12th at 9:45am  D/W Dr Hallman

## 2019-03-02 NOTE — PROGRESS NOTE ADULT - ASSESSMENT
64 yo F h/o Severe  Cardiomyopathy ,DM, Thyroid cancer CHF on cont. milrinone infusion via PICC, HTN, DM p/w acute on chronic CHF exacerbation with hospital course c/b concern for sepsis.    NEURO:   ZANDRA    CV:  CHF exacerbation  - on milrinone at home, continue home dose  - hold metolazone per HF recs  - hydralazine 20mg TID   - c/w spironolactone  - c/w metoprolol  - daily weights  - fluid restrict  - f/u HF team recs  - f/u EP to interrogate medtronic biV pacemaker    Pulm:  - diuresis as above  - RVP negative    :  - elevated Cr likely prerenal 2/2 intravascular depletion  - improving Cr, will continue to monitor Cr  - monitor UOP    - hyponatremia likely 2/2 hypervolemic hypovolemia  - continue to monitor, improving    GI:  - DASH diet    ID:  - previously with fever, leukocytosis, mildly hypotensive, repeat RVP negative  - clinically improved  - positive for coronavirus  - BCx NGTD from 2/28  - will stop vanc/zosyn/ceftriaxone

## 2019-03-02 NOTE — DISCHARGE NOTE ADULT - MEDICATION SUMMARY - MEDICATIONS TO STOP TAKING
I will STOP taking the medications listed below when I get home from the hospital:    metOLazone 2.5 mg oral tablet  -- 1 tab(s) by mouth 2 times a week monday and thursday    spironolactone 25 mg oral tablet  -- 2 tab(s) by mouth 2 times a day    potassium chloride 20 mEq oral tablet, extended release  -- 2 tab(s) by mouth once a day

## 2019-03-02 NOTE — PROGRESS NOTE ADULT - SUBJECTIVE AND OBJECTIVE BOX
PATIENT:  DELIA SERRANO  [id number]    CHIEF COMPLAINT:  Patient is a 63y old  Female who presents with a chief complaint of Gained weight and sob (02 Mar 2019 06:21)      INTERVAL HISTORY/OVERNIGHT EVENTS: Bumex gtt d/c'ed o/n; UOP >4L      REVIEW OF SYSTEMS:    Constitutional:     [ ] negative [ ] fevers [ ] chills [ ] weight loss [ ] weight gain  HEENT:                  [ ] negative [ ] dry eyes [ ] eye irritation [ ] postnasal drip [ ] nasal congestion  CV:                         [ ] negative  [ ] chest pain [ ] orthopnea [ ] palpitations [ ] murmur  Resp:                     [ ] negative [ ] cough [ ] shortness of breath [ ] dyspnea [ ] wheezing [ ] sputum [ ] hemoptysis  GI:                          [ ] negative [ ] nausea [ ] vomiting [ ] diarrhea [ ] constipation [ ] abd pain [ ] dysphagia   :                        [ ] negative [ ] dysuria [ ] nocturia [ ] hematuria [ ] increased urinary frequency  Musculoskeletal: [ ] negative [ ] back pain [ ] myalgias [ ] arthralgias [ ] fracture  Skin:                       [ ] negative [ ] rash [ ] itch  Neurological:        [ ] negative [ ] headache [ ] dizziness [ ] syncope [ ] weakness [ ] numbness  Psychiatric:           [ ] negative [ ] anxiety [ ] depression  Endocrine:            [ ] negative [ ] diabetes [ ] thyroid problem  Heme/Lymph:      [ ] negative [ ] anemia [ ] bleeding problem  Allergic/Immune: [ ] negative [ ] itchy eyes [ ] nasal discharge [ ] hives [ ] angioedema    [ ] All other systems negative  [ ] Unable to assess ROS because ________.    MEDICATIONS:  MEDICATIONS  (STANDING):  apixaban 5 milliGRAM(s) Oral every 12 hours  aspirin enteric coated 81 milliGRAM(s) Oral daily  calcitriol   Capsule 0.5 MICROGram(s) Oral daily  calcium acetate 667 milliGRAM(s) Oral four times a day with meals  calcium carbonate 1250 mG  + Vitamin D (OsCal 500 + D) 1 Tablet(s) Oral four times a day  dextrose 5%. 1000 milliLiter(s) (50 mL/Hr) IV Continuous <Continuous>  dextrose 50% Injectable 12.5 Gram(s) IV Push once  dextrose 50% Injectable 25 Gram(s) IV Push once  dextrose 50% Injectable 25 Gram(s) IV Push once  gabapentin 200 milliGRAM(s) Oral two times a day  hydrALAZINE 20 milliGRAM(s) Oral every 8 hours  insulin glargine Injectable (LANTUS) 10 Unit(s) SubCutaneous at bedtime  insulin lispro (HumaLOG) corrective regimen sliding scale   SubCutaneous three times a day before meals  insulin lispro (HumaLOG) corrective regimen sliding scale   SubCutaneous at bedtime  levothyroxine 175 MICROGram(s) Oral daily  magnesium oxide 400 milliGRAM(s) Oral three times a day with meals  metoprolol succinate ER 12.5 milliGRAM(s) Oral daily  milrinone Infusion 0.5 MICROgram(s)/kG/Min (12.72 mL/Hr) IV Continuous <Continuous>  rifaximin 550 milliGRAM(s) Oral two times a day  spironolactone 25 milliGRAM(s) Oral two times a day    MEDICATIONS  (PRN):  dextrose 40% Gel 15 Gram(s) Oral once PRN Blood Glucose LESS THAN 70 milliGRAM(s)/deciliter  glucagon  Injectable 1 milliGRAM(s) IntraMuscular once PRN Glucose LESS THAN 70 milligrams/deciliter  guaiFENesin   Syrup  (Sugar-Free) 200 milliGRAM(s) Oral every 6 hours PRN Cough      ALLERGIES:  Allergies    Isordil (Headache)  penicillin (Rash)    Intolerances        OBJECTIVE:  ICU Vital Signs Last 24 Hrs  T(C): 36.9 (02 Mar 2019 06:00), Max: 36.9 (02 Mar 2019 06:00)  T(F): 98.4 (02 Mar 2019 06:00), Max: 98.4 (02 Mar 2019 06:00)  HR: 96 (02 Mar 2019 07:00) (92 - 108)  BP: 106/69 (02 Mar 2019 07:00) (71/48 - 129/79)  BP(mean): 80 (02 Mar 2019 07:00) (60 - 97)  RR: 16 (02 Mar 2019 07:00) (11 - 22)  SpO2: 94% (02 Mar 2019 07:00) (94% - 98%)      POCT Blood Glucose.: 194 mg/dL (01 Mar 2019 17:05)  POCT Blood Glucose.: 214 mg/dL (01 Mar 2019 11:36)  POCT Blood Glucose.: 184 mg/dL (01 Mar 2019 08:27)    CAPILLARY BLOOD GLUCOSE      POCT Blood Glucose.: 194 mg/dL (01 Mar 2019 17:05)    I&O's Summary    01 Mar 2019 07:01  -  02 Mar 2019 07:00  --------------------------------------------------------  IN: 1804.6 mL / OUT: 5975 mL / NET: -4170.4 mL      Daily     Daily Weight in k.5 (02 Mar 2019 06:00)    PHYSICAL EXAMINATION:  General: WN/WD NAD  HEENT: PERRLA, EOMI, moist mucous membranes  Neurology: A&Ox3, nonfocal, ENCARNACION x 4  Respiratory: CTA B/L, normal respiratory effort, no wheezes, crackles, rales  CV: RRR, S1S2, no murmurs, rubs or gallops  Abdominal: Soft, NT, ND +BS, Last BM  Extremities: No edema, + peripheral pulses  Incisions:   Tubes:    LABS:                          9.8    7.9   )-----------( 203      ( 02 Mar 2019 01:54 )             31.1     03-02    124<L>  |  77<L>  |  31<H>  ----------------------------<  252<H>  3.8   |  36<H>  |  1.15    Ca    7.7<L>      02 Mar 2019 01:54  Phos  4.3     03-02  Mg     1.9     03-02    TPro  7.5  /  Alb  3.4  /  TBili  4.9<H>  /  DBili  x   /  AST  49<H>  /  ALT  25  /  AlkPhos  346<H>  03-02    LIVER FUNCTIONS - ( 02 Mar 2019 01:54 )  Alb: 3.4 g/dL / Pro: 7.5 g/dL / ALK PHOS: 346 U/L / ALT: 25 U/L / AST: 49 U/L / GGT: x           PT/INR - ( 02 Mar 2019 01:54 )   PT: 25.7 sec;   INR: 2.20 ratio         PTT - ( 02 Mar 2019 01:54 )  PTT:28.5 sec            TELEMETRY:     EKG:     IMAGING:

## 2019-03-02 NOTE — PROGRESS NOTE ADULT - ASSESSMENT
64 yo F h/o Severe  Cardiomyopathy ,DM, Thyroid cancer CHF on cont. milrinone infusion via PICC, HTN, DM, who reports a few days of progressively worsening SOB at rest and w/ exertion as compared to her usual baseline.  Associated w/ bilateral LE non painful edema over the last 2-3 days. Pt. w/ chronic non productive cough at baseline but this has also worsened over the last 2 days. She denies hemoptysis. States that she had a couple episodes of vomiting which occurred after intense coughing. She experienced one episode of emesis yesterday evening after dinner which was described as non bloody mucus. She denies abdominal pain or discomfort. Last BM was this morning normal appearing. Denies CP. Denies pleuritic pain. No reports of fevers, chill, flank or back pain, urinary complaints. Visiting nurse today aware of patient's worsening cough and SOB contacted patients cardiologist and patient was referred to the ED for admission.     Problem/Plan - 1:  ·  Problem: Acute on chronic systolic congestive heart failure.  Plan: Milrinone and Bumex infusion  and PO spironolactone. Heart Failure  team and cardiology helping.      Problem/Plan - 2:  ·  Problem:  Abnormal LFTs (liver function tests).  Plan: Likely sec to Liver congestion . Will check Ammonia level in AM . Will consult Hepatology if AM labs are worse.      Problem/Plan - 3:  ·  Problem: DM (diabetes mellitus).  Plan: Insulin for now. Sugars in good range.      Problem/Plan - 4:  ·  Problem: Hypothyroidism.  Plan: Synthroid home dose.      Problem/Plan - 5:  ·  Problem: Coagulopathy.  Plan: Vitamin K . Sec to liver congestion .      Problem/Plan - 6:  Problem: Hypocalcemia. Plan: Replacing.     Problem/Plan - 7:  ·  Problem: Hyponatremia.  Plan: Renal helping.      Problem/Plan - 8:  ·  Problem: Anemia.  Plan: Watching CBC.      Problem/Plan - 9:  ·  Problem: Fever .  Plan: Likely Viral Corona but has high ESR&CRP. Cultures negative . Cough suppressant .

## 2019-03-02 NOTE — DISCHARGE NOTE ADULT - ADDITIONAL INSTRUCTIONS
follow up with Dr. Robbins 3/12/19 @ 9:45 AM outpatient appointment made for Monday 3/11/19 830 am in heart failure clinic at [facility]   follow up with Dr. Robbins 3/12/19 @ 9:45 AM

## 2019-03-02 NOTE — CHART NOTE - NSCHARTNOTEFT_GEN_A_CORE
====================  CCU MIDNIGHT ROUNDS  ====================    DELIA SERRANO  [id number]    ====================  SUMMARY: HPI:  64 yo F h/o Severe  Cardiomyopathy ,DM, Thyroid cancer CHF on cont. milrinone infusion via PICC, HTN, DM, who reports a few days of progressively worsening SOB at rest and w/ exertion as compared to her usual baseline.  Associated w/ bilateral LE non painful edema over the last 2-3 days. Pt. w/ chronic non productive cough at baseline but this has also worsened over the last 2 days. She denies hemoptysis. States that she had a couple episodes of vomiting which occurred after intense coughing. She experienced one episode of emesis yesterday evening after dinner which was described as non bloody mucus. She denies abdominal pain or discomfort. Last BM was this morning normal appearing. Denies CP. Denies pleuritic pain. No reports of fevers, chill, flank or back pain, urinary complaints. Visiting nurse today aware of patient's worsening cough and SOB contacted patients cardiologist and patient was referred to the ED for admission. (27 Feb 2019 21:59)    ====================        ====================  NEW EVENTS:  ====================        ====================  VITALS (Last 12 hrs):  ====================    T(C): 36.6 (03-01-19 @ 20:00), Max: 36.6 (03-01-19 @ 20:00)  HR: 104 (03-02-19 @ 01:00) (92 - 104)  BP: 126/80 (03-02-19 @ 01:00) (86/62 - 129/79)  BP(mean): 97 (03-02-19 @ 01:00) (73 - 97)  ABP: --  ABP(mean): --  RR: 19 (03-02-19 @ 01:00) (16 - 22)  SpO2: 97% (03-02-19 @ 01:00) (95% - 98%)  Wt(kg): --  CVP(mm Hg): --  CO: --  CI: --  PA: --  PA(mean): --  PA(direct): --  PCWP: --  SVR: --    TELEMETRY:        *BLOOD GAS/ARTERIAL/MIXED/VENOUS  *LACTATE    I&O's Summary    28 Feb 2019 07:01  -  01 Mar 2019 07:00  --------------------------------------------------------  IN: 864 mL / OUT: 7700 mL / NET: -6836 mL    01 Mar 2019 07:01  -  02 Mar 2019 01:54  --------------------------------------------------------  IN: 1326.3 mL / OUT: 3875 mL / NET: -2548.7 mL        ====================  PLAN:  ====================      Brian Alberts, TRISH PA-C  #[id number] ====================  CCU MIDNIGHT ROUNDS  ====================    DELIA SERRANO  [id number]    ====================  SUMMARY: HPI:  64 yo F h/o Severe  Cardiomyopathy ,DM, Thyroid cancer CHF on cont. milrinone infusion via PICC, HTN, DM, who reports a few days of progressively worsening SOB at rest and w/ exertion as compared to her usual baseline.  Associated w/ bilateral LE non painful edema over the last 2-3 days. Pt. w/ chronic non productive cough at baseline but this has also worsened over the last 2 days. She denies hemoptysis. States that she had a couple episodes of vomiting which occurred after intense coughing. She experienced one episode of emesis yesterday evening after dinner which was described as non bloody mucus. She denies abdominal pain or discomfort. Last BM was this morning normal appearing. Denies CP. Denies pleuritic pain. No reports of fevers, chill, flank or back pain, urinary complaints. Visiting nurse today aware of patient's worsening cough and SOB contacted patients cardiologist and patient was referred to the ED for admission. (27 Feb 2019 21:59)    ====================        ====================  NEW EVENTS:  ====================  Net negative 3.5L, Bumex gtt decreased to 0.5.      ====================  VITALS (Last 12 hrs):  ====================    T(C): 36.6 (03-01-19 @ 20:00), Max: 36.6 (03-01-19 @ 20:00)  HR: 104 (03-02-19 @ 01:00) (92 - 104)  BP: 126/80 (03-02-19 @ 01:00) (86/62 - 129/79)  BP(mean): 97 (03-02-19 @ 01:00) (73 - 97)  RR: 19 (03-02-19 @ 01:00) (16 - 22)  SpO2: 97% (03-02-19 @ 01:00) (95% - 98%)      TELEMETRY: Paced rhythm      *LACTATE  Lactate, Blood (02.28.19 @ 15:49)    Lactate, Blood: 2.3 mmol/L      I&O's Summary    28 Feb 2019 07:01  -  01 Mar 2019 07:00  --------------------------------------------------------  IN: 864 mL / OUT: 7700 mL / NET: -6836 mL    01 Mar 2019 07:01  -  02 Mar 2019 01:54  --------------------------------------------------------  IN: 1326.3 mL / OUT: 3875 mL / NET: -2548.7 mL        ====================  PLAN:  ====================  # ADHF  - c/w milrinone @ home dose  - c/w bumex gtt for diuresis; monitor strict I/Os, lytes, weights  - not a candidate for advanced therapies; poorly compliant   - neg RVP  - continue spironolactone, hydralazine, BB    Brian Alberts, CCU PA-C  #[id number]

## 2019-03-02 NOTE — PROGRESS NOTE ADULT - SUBJECTIVE AND OBJECTIVE BOX
INTERVAL HPI/OVERNIGHT EVENTS: I feel better.   Vital Signs Last 24 Hrs  T(C): 37.2 (02 Mar 2019 18:00), Max: 37.4 (02 Mar 2019 12:00)  T(F): 98.9 (02 Mar 2019 18:00), Max: 99.3 (02 Mar 2019 12:00)  HR: 100 (02 Mar 2019 18:00) (92 - 106)  BP: 110/83 (02 Mar 2019 18:00) (94/68 - 129/79)  BP(mean): 93 (02 Mar 2019 18:00) (71 - 98)  RR: 24 (02 Mar 2019 18:00) (14 - 31)  SpO2: 96% (02 Mar 2019 18:00) (92% - 98%)  I&O's Summary    01 Mar 2019 07:01  -  02 Mar 2019 07:00  --------------------------------------------------------  IN: 1804.6 mL / OUT: 5975 mL / NET: -4170.4 mL    02 Mar 2019 07:01  -  02 Mar 2019 18:38  --------------------------------------------------------  IN: 997 mL / OUT: 2050 mL / NET: -1053 mL      MEDICATIONS  (STANDING):  apixaban 5 milliGRAM(s) Oral every 12 hours  aspirin enteric coated 81 milliGRAM(s) Oral daily  calcitriol   Capsule 0.5 MICROGram(s) Oral daily  calcium acetate 667 milliGRAM(s) Oral four times a day with meals  calcium carbonate 1250 mG  + Vitamin D (OsCal 500 + D) 1 Tablet(s) Oral four times a day  dextrose 5%. 1000 milliLiter(s) (50 mL/Hr) IV Continuous <Continuous>  dextrose 50% Injectable 12.5 Gram(s) IV Push once  dextrose 50% Injectable 25 Gram(s) IV Push once  dextrose 50% Injectable 25 Gram(s) IV Push once  gabapentin 200 milliGRAM(s) Oral two times a day  hydrALAZINE 20 milliGRAM(s) Oral every 8 hours  insulin glargine Injectable (LANTUS) 10 Unit(s) SubCutaneous at bedtime  insulin lispro (HumaLOG) corrective regimen sliding scale   SubCutaneous three times a day before meals  insulin lispro (HumaLOG) corrective regimen sliding scale   SubCutaneous at bedtime  levothyroxine 175 MICROGram(s) Oral daily  magnesium oxide 400 milliGRAM(s) Oral three times a day with meals  metoprolol succinate ER 12.5 milliGRAM(s) Oral daily  milrinone Infusion 0.5 MICROgram(s)/kG/Min (12.72 mL/Hr) IV Continuous <Continuous>  potassium chloride  20 mEq/100 mL IVPB 20 milliEquivalent(s) IV Intermittent every 2 hours  rifaximin 550 milliGRAM(s) Oral two times a day  spironolactone 25 milliGRAM(s) Oral two times a day    MEDICATIONS  (PRN):  dextrose 40% Gel 15 Gram(s) Oral once PRN Blood Glucose LESS THAN 70 milliGRAM(s)/deciliter  glucagon  Injectable 1 milliGRAM(s) IntraMuscular once PRN Glucose LESS THAN 70 milligrams/deciliter  guaiFENesin   Syrup  (Sugar-Free) 200 milliGRAM(s) Oral every 6 hours PRN Cough    LABS:                        9.8    7.9   )-----------( 203      ( 02 Mar 2019 01:54 )             31.1     03-02    122<L>  |  74<L>  |  25<H>  ----------------------------<  321<H>  3.4<L>   |  32<H>  |  1.10    Ca    7.3<L>      02 Mar 2019 16:24  Phos  4.8     03-02  Mg     1.6     03-02    TPro  7.3  /  Alb  2.9<L>  /  TBili  4.1<H>  /  DBili  x   /  AST  52<H>  /  ALT  27  /  AlkPhos  345<H>  03-02    PT/INR - ( 02 Mar 2019 01:54 )   PT: 25.7 sec;   INR: 2.20 ratio         PTT - ( 02 Mar 2019 01:54 )  PTT:28.5 sec    CAPILLARY BLOOD GLUCOSE      POCT Blood Glucose.: 186 mg/dL (02 Mar 2019 17:35)  POCT Blood Glucose.: 184 mg/dL (02 Mar 2019 12:47)  POCT Blood Glucose.: 155 mg/dL (02 Mar 2019 08:26)          REVIEW OF SYSTEMS:  CONSTITUTIONAL: No fever, weight loss, or fatigue  EYES: No eye pain, visual disturbances, or discharge  RESPIRATORY: No cough, wheezing, chills or hemoptysis; No shortness of breath  CARDIOVASCULAR: No chest pain, palpitations, dizziness, or leg swelling  GASTROINTESTINAL: No abdominal or epigastric pain. No nausea, vomiting, or hematemesis; No diarrhea or constipation. No melena or hematochezia.  GENITOURINARY: No dysuria, frequency, hematuria, or incontinence  NEUROLOGICAL: No headaches, memory loss, loss of strength, numbness, or tremors    Consultant(s) Notes Reviewed:  [x ] YES  [ ] NO    PHYSICAL EXAM:  GENERAL: NAD, ,not in any distress ,  HEAD:  Atraumatic, Normocephalic  EYES: EOMI, PERRLA, conjunctiva and sclera clear  NECK: JVD ++  NERVOUS SYSTEM:  Alert & Oriented X3, No focal deficit   CHEST/LUNG: Good air entry bilateral with no  rales, rhonchi, wheezing, or rubs  HEART: Regular rate and rhythm; No murmurs, rubs, or gallops  ABDOMEN: Soft, Nontender, Nondistended; Bowel sounds present  EXTREMITIES:  2+ Peripheral Pulses, No clubbing, cyanosis, but less edema    Care Discussed with Consultants/Other Providers [ x] YES  [ ] NO

## 2019-03-02 NOTE — DISCHARGE NOTE ADULT - PROVIDER TOKENS
FREE:[LAST:[[facility]],PHONE:[(   )    -],FAX:[(   )    -]] PROVIDER:[TOKEN:[[id number]]],PROVIDER:[TOKEN:[[id number]]],PROVIDER:[TOKEN:[[id number]]]

## 2019-03-02 NOTE — DISCHARGE NOTE ADULT - NS AS DC HF EDUCATION INSTRUCTIONS
Activities as tolerated/Monitor Weight Daily/Call Primary Care Provider for follow-up after discharge/Report weight gain of 2 or more pounds in one day or 3 or more pounds in one week, worsening shortness of breath, fatigue, weakness, increased swelling of hands and feet to primary care provider/Low salt diet

## 2019-03-03 LAB
ALBUMIN SERPL ELPH-MCNC: 2.9 G/DL — LOW (ref 3.3–5)
ALBUMIN SERPL ELPH-MCNC: 3.2 G/DL — LOW (ref 3.3–5)
ALBUMIN SERPL ELPH-MCNC: 3.2 G/DL — LOW (ref 3.3–5)
ALBUMIN SERPL ELPH-MCNC: 3.4 G/DL — SIGNIFICANT CHANGE UP (ref 3.3–5)
ALBUMIN SERPL ELPH-MCNC: 3.5 G/DL — SIGNIFICANT CHANGE UP (ref 3.3–5)
ALP SERPL-CCNC: 371 U/L — HIGH (ref 40–120)
ALP SERPL-CCNC: 374 U/L — HIGH (ref 40–120)
ALP SERPL-CCNC: 397 U/L — HIGH (ref 40–120)
ALP SERPL-CCNC: 407 U/L — HIGH (ref 40–120)
ALP SERPL-CCNC: 417 U/L — HIGH (ref 40–120)
ALT FLD-CCNC: 27 U/L — SIGNIFICANT CHANGE UP (ref 10–45)
ALT FLD-CCNC: 29 U/L — SIGNIFICANT CHANGE UP (ref 10–45)
ALT FLD-CCNC: 31 U/L — SIGNIFICANT CHANGE UP (ref 10–45)
ALT FLD-CCNC: 33 U/L — SIGNIFICANT CHANGE UP (ref 10–45)
ALT FLD-CCNC: 37 U/L — SIGNIFICANT CHANGE UP (ref 10–45)
ANION GAP SERPL CALC-SCNC: 14 MMOL/L — SIGNIFICANT CHANGE UP (ref 5–17)
ANION GAP SERPL CALC-SCNC: 16 MMOL/L — SIGNIFICANT CHANGE UP (ref 5–17)
ANION GAP SERPL CALC-SCNC: 18 MMOL/L — HIGH (ref 5–17)
APTT BLD: 32.4 SEC — SIGNIFICANT CHANGE UP (ref 27.5–36.3)
APTT BLD: 32.6 SEC — SIGNIFICANT CHANGE UP (ref 27.5–36.3)
APTT BLD: 33.2 SEC — SIGNIFICANT CHANGE UP (ref 27.5–36.3)
AST SERPL-CCNC: 59 U/L — HIGH (ref 10–40)
AST SERPL-CCNC: 69 U/L — HIGH (ref 10–40)
AST SERPL-CCNC: 72 U/L — HIGH (ref 10–40)
AST SERPL-CCNC: 77 U/L — HIGH (ref 10–40)
AST SERPL-CCNC: 85 U/L — HIGH (ref 10–40)
BILIRUB SERPL-MCNC: 3.9 MG/DL — HIGH (ref 0.2–1.2)
BILIRUB SERPL-MCNC: 3.9 MG/DL — HIGH (ref 0.2–1.2)
BILIRUB SERPL-MCNC: 4.2 MG/DL — HIGH (ref 0.2–1.2)
BILIRUB SERPL-MCNC: 4.3 MG/DL — HIGH (ref 0.2–1.2)
BILIRUB SERPL-MCNC: 4.3 MG/DL — HIGH (ref 0.2–1.2)
BUN SERPL-MCNC: 20 MG/DL — SIGNIFICANT CHANGE UP (ref 7–23)
BUN SERPL-MCNC: 22 MG/DL — SIGNIFICANT CHANGE UP (ref 7–23)
BUN SERPL-MCNC: 24 MG/DL — HIGH (ref 7–23)
CALCIUM SERPL-MCNC: 7.3 MG/DL — LOW (ref 8.4–10.5)
CALCIUM SERPL-MCNC: 7.9 MG/DL — LOW (ref 8.4–10.5)
CALCIUM SERPL-MCNC: 7.9 MG/DL — LOW (ref 8.4–10.5)
CALCIUM SERPL-MCNC: 8.2 MG/DL — LOW (ref 8.4–10.5)
CALCIUM SERPL-MCNC: 8.4 MG/DL — SIGNIFICANT CHANGE UP (ref 8.4–10.5)
CHLORIDE SERPL-SCNC: 70 MMOL/L — LOW (ref 96–108)
CHLORIDE SERPL-SCNC: 74 MMOL/L — LOW (ref 96–108)
CHLORIDE SERPL-SCNC: 75 MMOL/L — LOW (ref 96–108)
CHLORIDE SERPL-SCNC: 76 MMOL/L — LOW (ref 96–108)
CHLORIDE SERPL-SCNC: 77 MMOL/L — LOW (ref 96–108)
CO2 SERPL-SCNC: 26 MMOL/L — SIGNIFICANT CHANGE UP (ref 22–31)
CO2 SERPL-SCNC: 28 MMOL/L — SIGNIFICANT CHANGE UP (ref 22–31)
CO2 SERPL-SCNC: 29 MMOL/L — SIGNIFICANT CHANGE UP (ref 22–31)
CO2 SERPL-SCNC: 31 MMOL/L — SIGNIFICANT CHANGE UP (ref 22–31)
CO2 SERPL-SCNC: 32 MMOL/L — HIGH (ref 22–31)
CREAT SERPL-MCNC: 0.81 MG/DL — SIGNIFICANT CHANGE UP (ref 0.5–1.3)
CREAT SERPL-MCNC: 0.84 MG/DL — SIGNIFICANT CHANGE UP (ref 0.5–1.3)
CREAT SERPL-MCNC: 0.86 MG/DL — SIGNIFICANT CHANGE UP (ref 0.5–1.3)
CREAT SERPL-MCNC: 0.88 MG/DL — SIGNIFICANT CHANGE UP (ref 0.5–1.3)
CREAT SERPL-MCNC: 1.05 MG/DL — SIGNIFICANT CHANGE UP (ref 0.5–1.3)
GLUCOSE BLDC GLUCOMTR-MCNC: 150 MG/DL — HIGH (ref 70–99)
GLUCOSE BLDC GLUCOMTR-MCNC: 157 MG/DL — HIGH (ref 70–99)
GLUCOSE BLDC GLUCOMTR-MCNC: 177 MG/DL — HIGH (ref 70–99)
GLUCOSE BLDC GLUCOMTR-MCNC: 197 MG/DL — HIGH (ref 70–99)
GLUCOSE BLDC GLUCOMTR-MCNC: 204 MG/DL — HIGH (ref 70–99)
GLUCOSE BLDC GLUCOMTR-MCNC: 340 MG/DL — HIGH (ref 70–99)
GLUCOSE SERPL-MCNC: 165 MG/DL — HIGH (ref 70–99)
GLUCOSE SERPL-MCNC: 169 MG/DL — HIGH (ref 70–99)
GLUCOSE SERPL-MCNC: 438 MG/DL — HIGH (ref 70–99)
GLUCOSE SERPL-MCNC: 481 MG/DL — CRITICAL HIGH (ref 70–99)
GLUCOSE SERPL-MCNC: 99 MG/DL — SIGNIFICANT CHANGE UP (ref 70–99)
HCT VFR BLD CALC: 28.8 % — LOW (ref 34.5–45)
HCT VFR BLD CALC: 33 % — LOW (ref 34.5–45)
HCT VFR BLD CALC: 33.2 % — LOW (ref 34.5–45)
HCT VFR BLD CALC: 33.3 % — LOW (ref 34.5–45)
HGB BLD-MCNC: 10.6 G/DL — LOW (ref 11.5–15.5)
HGB BLD-MCNC: 10.7 G/DL — LOW (ref 11.5–15.5)
HGB BLD-MCNC: 10.9 G/DL — LOW (ref 11.5–15.5)
HGB BLD-MCNC: 9.4 G/DL — LOW (ref 11.5–15.5)
INR BLD: 2.33 RATIO — HIGH (ref 0.88–1.16)
INR BLD: 2.66 RATIO — HIGH (ref 0.88–1.16)
INR BLD: 2.7 RATIO — HIGH (ref 0.88–1.16)
MAGNESIUM SERPL-MCNC: 1.9 MG/DL — SIGNIFICANT CHANGE UP (ref 1.6–2.6)
MAGNESIUM SERPL-MCNC: 2 MG/DL — SIGNIFICANT CHANGE UP (ref 1.6–2.6)
MAGNESIUM SERPL-MCNC: 2.1 MG/DL — SIGNIFICANT CHANGE UP (ref 1.6–2.6)
MAGNESIUM SERPL-MCNC: 2.1 MG/DL — SIGNIFICANT CHANGE UP (ref 1.6–2.6)
MAGNESIUM SERPL-MCNC: 2.2 MG/DL — SIGNIFICANT CHANGE UP (ref 1.6–2.6)
MCHC RBC-ENTMCNC: 23.6 PG — LOW (ref 27–34)
MCHC RBC-ENTMCNC: 23.6 PG — LOW (ref 27–34)
MCHC RBC-ENTMCNC: 24 PG — LOW (ref 27–34)
MCHC RBC-ENTMCNC: 24.1 PG — LOW (ref 27–34)
MCHC RBC-ENTMCNC: 32.2 GM/DL — SIGNIFICANT CHANGE UP (ref 32–36)
MCHC RBC-ENTMCNC: 32.2 GM/DL — SIGNIFICANT CHANGE UP (ref 32–36)
MCHC RBC-ENTMCNC: 32.8 GM/DL — SIGNIFICANT CHANGE UP (ref 32–36)
MCHC RBC-ENTMCNC: 32.8 GM/DL — SIGNIFICANT CHANGE UP (ref 32–36)
MCV RBC AUTO: 73.3 FL — LOW (ref 80–100)
MCV RBC AUTO: 73.3 FL — LOW (ref 80–100)
MCV RBC AUTO: 73.4 FL — LOW (ref 80–100)
MCV RBC AUTO: 73.6 FL — LOW (ref 80–100)
PHOSPHATE SERPL-MCNC: 3.6 MG/DL — SIGNIFICANT CHANGE UP (ref 2.5–4.5)
PHOSPHATE SERPL-MCNC: 3.8 MG/DL — SIGNIFICANT CHANGE UP (ref 2.5–4.5)
PHOSPHATE SERPL-MCNC: 3.9 MG/DL — SIGNIFICANT CHANGE UP (ref 2.5–4.5)
PHOSPHATE SERPL-MCNC: 4 MG/DL — SIGNIFICANT CHANGE UP (ref 2.5–4.5)
PHOSPHATE SERPL-MCNC: 4.4 MG/DL — SIGNIFICANT CHANGE UP (ref 2.5–4.5)
PLATELET # BLD AUTO: 181 K/UL — SIGNIFICANT CHANGE UP (ref 150–400)
PLATELET # BLD AUTO: 188 K/UL — SIGNIFICANT CHANGE UP (ref 150–400)
PLATELET # BLD AUTO: 232 K/UL — SIGNIFICANT CHANGE UP (ref 150–400)
PLATELET # BLD AUTO: 246 K/UL — SIGNIFICANT CHANGE UP (ref 150–400)
POTASSIUM SERPL-MCNC: 3.3 MMOL/L — LOW (ref 3.5–5.3)
POTASSIUM SERPL-MCNC: 3.4 MMOL/L — LOW (ref 3.5–5.3)
POTASSIUM SERPL-MCNC: 3.5 MMOL/L — SIGNIFICANT CHANGE UP (ref 3.5–5.3)
POTASSIUM SERPL-MCNC: 4.1 MMOL/L — SIGNIFICANT CHANGE UP (ref 3.5–5.3)
POTASSIUM SERPL-MCNC: 4.1 MMOL/L — SIGNIFICANT CHANGE UP (ref 3.5–5.3)
POTASSIUM SERPL-SCNC: 3.3 MMOL/L — LOW (ref 3.5–5.3)
POTASSIUM SERPL-SCNC: 3.4 MMOL/L — LOW (ref 3.5–5.3)
POTASSIUM SERPL-SCNC: 3.5 MMOL/L — SIGNIFICANT CHANGE UP (ref 3.5–5.3)
POTASSIUM SERPL-SCNC: 4.1 MMOL/L — SIGNIFICANT CHANGE UP (ref 3.5–5.3)
POTASSIUM SERPL-SCNC: 4.1 MMOL/L — SIGNIFICANT CHANGE UP (ref 3.5–5.3)
PROT SERPL-MCNC: 7 G/DL — SIGNIFICANT CHANGE UP (ref 6–8.3)
PROT SERPL-MCNC: 7.4 G/DL — SIGNIFICANT CHANGE UP (ref 6–8.3)
PROT SERPL-MCNC: 7.6 G/DL — SIGNIFICANT CHANGE UP (ref 6–8.3)
PROT SERPL-MCNC: 7.6 G/DL — SIGNIFICANT CHANGE UP (ref 6–8.3)
PROT SERPL-MCNC: 7.9 G/DL — SIGNIFICANT CHANGE UP (ref 6–8.3)
PROTHROM AB SERPL-ACNC: 27.5 SEC — HIGH (ref 10–12.9)
PROTHROM AB SERPL-ACNC: 31.3 SEC — HIGH (ref 10–12.9)
PROTHROM AB SERPL-ACNC: 31.8 SEC — HIGH (ref 10–12.9)
RBC # BLD: 3.91 M/UL — SIGNIFICANT CHANGE UP (ref 3.8–5.2)
RBC # BLD: 4.5 M/UL — SIGNIFICANT CHANGE UP (ref 3.8–5.2)
RBC # BLD: 4.52 M/UL — SIGNIFICANT CHANGE UP (ref 3.8–5.2)
RBC # BLD: 4.54 M/UL — SIGNIFICANT CHANGE UP (ref 3.8–5.2)
RBC # FLD: 22.6 % — HIGH (ref 10.3–14.5)
RBC # FLD: 22.8 % — HIGH (ref 10.3–14.5)
RBC # FLD: 23 % — HIGH (ref 10.3–14.5)
RBC # FLD: 23.1 % — HIGH (ref 10.3–14.5)
SODIUM SERPL-SCNC: 116 MMOL/L — CRITICAL LOW (ref 135–145)
SODIUM SERPL-SCNC: 117 MMOL/L — CRITICAL LOW (ref 135–145)
SODIUM SERPL-SCNC: 120 MMOL/L — CRITICAL LOW (ref 135–145)
SODIUM SERPL-SCNC: 122 MMOL/L — LOW (ref 135–145)
SODIUM SERPL-SCNC: 123 MMOL/L — LOW (ref 135–145)
WBC # BLD: 7 K/UL — SIGNIFICANT CHANGE UP (ref 3.8–10.5)
WBC # BLD: 7.2 K/UL — SIGNIFICANT CHANGE UP (ref 3.8–10.5)
WBC # BLD: 7.4 K/UL — SIGNIFICANT CHANGE UP (ref 3.8–10.5)
WBC # BLD: 7.9 K/UL — SIGNIFICANT CHANGE UP (ref 3.8–10.5)
WBC # FLD AUTO: 7 K/UL — SIGNIFICANT CHANGE UP (ref 3.8–10.5)
WBC # FLD AUTO: 7.2 K/UL — SIGNIFICANT CHANGE UP (ref 3.8–10.5)
WBC # FLD AUTO: 7.4 K/UL — SIGNIFICANT CHANGE UP (ref 3.8–10.5)
WBC # FLD AUTO: 7.9 K/UL — SIGNIFICANT CHANGE UP (ref 3.8–10.5)

## 2019-03-03 PROCEDURE — 99233 SBSQ HOSP IP/OBS HIGH 50: CPT | Mod: GC

## 2019-03-03 PROCEDURE — 93010 ELECTROCARDIOGRAM REPORT: CPT

## 2019-03-03 RX ORDER — POTASSIUM CHLORIDE 20 MEQ
40 PACKET (EA) ORAL ONCE
Qty: 0 | Refills: 0 | Status: COMPLETED | OUTPATIENT
Start: 2019-03-03 | End: 2019-03-03

## 2019-03-03 RX ORDER — INSULIN GLARGINE 100 [IU]/ML
12 INJECTION, SOLUTION SUBCUTANEOUS AT BEDTIME
Qty: 0 | Refills: 0 | Status: DISCONTINUED | OUTPATIENT
Start: 2019-03-03 | End: 2019-03-04

## 2019-03-03 RX ORDER — TOLVAPTAN 15 MG/1
30 TABLET ORAL ONCE
Qty: 0 | Refills: 0 | Status: COMPLETED | OUTPATIENT
Start: 2019-03-03 | End: 2019-03-03

## 2019-03-03 RX ADMIN — Medication 12.5 MILLIGRAM(S): at 05:49

## 2019-03-03 RX ADMIN — Medication 20 MILLIGRAM(S): at 05:49

## 2019-03-03 RX ADMIN — Medication 1 TABLET(S): at 05:49

## 2019-03-03 RX ADMIN — MILRINONE LACTATE 12.72 MICROGRAM(S)/KG/MIN: 1 INJECTION, SOLUTION INTRAVENOUS at 05:46

## 2019-03-03 RX ADMIN — Medication 667 MILLIGRAM(S): at 21:28

## 2019-03-03 RX ADMIN — Medication 20 MILLIGRAM(S): at 14:56

## 2019-03-03 RX ADMIN — Medication 175 MICROGRAM(S): at 05:49

## 2019-03-03 RX ADMIN — GABAPENTIN 200 MILLIGRAM(S): 400 CAPSULE ORAL at 17:38

## 2019-03-03 RX ADMIN — SPIRONOLACTONE 25 MILLIGRAM(S): 25 TABLET, FILM COATED ORAL at 05:49

## 2019-03-03 RX ADMIN — Medication 667 MILLIGRAM(S): at 17:39

## 2019-03-03 RX ADMIN — Medication 81 MILLIGRAM(S): at 12:18

## 2019-03-03 RX ADMIN — CHLORHEXIDINE GLUCONATE 1 APPLICATION(S): 213 SOLUTION TOPICAL at 06:03

## 2019-03-03 RX ADMIN — Medication 4: at 12:20

## 2019-03-03 RX ADMIN — MAGNESIUM OXIDE 400 MG ORAL TABLET 400 MILLIGRAM(S): 241.3 TABLET ORAL at 08:12

## 2019-03-03 RX ADMIN — GABAPENTIN 200 MILLIGRAM(S): 400 CAPSULE ORAL at 06:11

## 2019-03-03 RX ADMIN — Medication 200 MILLIGRAM(S): at 17:41

## 2019-03-03 RX ADMIN — Medication 40 MILLIEQUIVALENT(S): at 16:00

## 2019-03-03 RX ADMIN — Medication 667 MILLIGRAM(S): at 12:19

## 2019-03-03 RX ADMIN — Medication 1 TABLET(S): at 21:39

## 2019-03-03 RX ADMIN — MILRINONE LACTATE 12.72 MICROGRAM(S)/KG/MIN: 1 INJECTION, SOLUTION INTRAVENOUS at 08:13

## 2019-03-03 RX ADMIN — INSULIN GLARGINE 12 UNIT(S): 100 INJECTION, SOLUTION SUBCUTANEOUS at 21:34

## 2019-03-03 RX ADMIN — MAGNESIUM OXIDE 400 MG ORAL TABLET 400 MILLIGRAM(S): 241.3 TABLET ORAL at 17:38

## 2019-03-03 RX ADMIN — Medication 40 MILLIEQUIVALENT(S): at 17:40

## 2019-03-03 RX ADMIN — APIXABAN 5 MILLIGRAM(S): 2.5 TABLET, FILM COATED ORAL at 05:50

## 2019-03-03 RX ADMIN — Medication 20 MILLIGRAM(S): at 21:28

## 2019-03-03 RX ADMIN — APIXABAN 5 MILLIGRAM(S): 2.5 TABLET, FILM COATED ORAL at 17:38

## 2019-03-03 RX ADMIN — Medication 1 TABLET(S): at 17:40

## 2019-03-03 RX ADMIN — CALCITRIOL 0.5 MICROGRAM(S): 0.5 CAPSULE ORAL at 12:18

## 2019-03-03 RX ADMIN — SPIRONOLACTONE 25 MILLIGRAM(S): 25 TABLET, FILM COATED ORAL at 17:39

## 2019-03-03 RX ADMIN — MAGNESIUM OXIDE 400 MG ORAL TABLET 400 MILLIGRAM(S): 241.3 TABLET ORAL at 12:18

## 2019-03-03 RX ADMIN — Medication 1 TABLET(S): at 12:18

## 2019-03-03 RX ADMIN — Medication 667 MILLIGRAM(S): at 08:12

## 2019-03-03 RX ADMIN — Medication 2: at 17:52

## 2019-03-03 NOTE — PROGRESS NOTE ADULT - ATTENDING COMMENTS
Patient is seen and examined with fellow, NP and the CCU house-staff. I agree with the history, physical and the assessment and plan.  patient is currently euvolemic  on her home inotropic suport   taking the eliquis for the afib noted on remote monitor

## 2019-03-03 NOTE — PROGRESS NOTE ADULT - SUBJECTIVE AND OBJECTIVE BOX
pt seen and examined, no complaints, ROS - .     apixaban 5 milliGRAM(s) Oral every 12 hours  aspirin enteric coated 81 milliGRAM(s) Oral daily  calcitriol   Capsule 0.5 MICROGram(s) Oral daily  calcium acetate 667 milliGRAM(s) Oral four times a day with meals  calcium carbonate 1250 mG  + Vitamin D (OsCal 500 + D) 1 Tablet(s) Oral four times a day  chlorhexidine 4% Liquid 1 Application(s) Topical <User Schedule>  dextrose 40% Gel 15 Gram(s) Oral once PRN  dextrose 5%. 1000 milliLiter(s) IV Continuous <Continuous>  dextrose 50% Injectable 12.5 Gram(s) IV Push once  dextrose 50% Injectable 25 Gram(s) IV Push once  dextrose 50% Injectable 25 Gram(s) IV Push once  gabapentin 200 milliGRAM(s) Oral two times a day  glucagon  Injectable 1 milliGRAM(s) IntraMuscular once PRN  guaiFENesin   Syrup  (Sugar-Free) 200 milliGRAM(s) Oral every 6 hours PRN  hydrALAZINE 20 milliGRAM(s) Oral every 8 hours  insulin glargine Injectable (LANTUS) 12 Unit(s) SubCutaneous at bedtime  insulin lispro (HumaLOG) corrective regimen sliding scale   SubCutaneous three times a day before meals  insulin lispro (HumaLOG) corrective regimen sliding scale   SubCutaneous at bedtime  levothyroxine 175 MICROGram(s) Oral daily  magnesium oxide 400 milliGRAM(s) Oral three times a day with meals  metoprolol succinate ER 12.5 milliGRAM(s) Oral daily  milrinone Infusion 0.5 MICROgram(s)/kG/Min IV Continuous <Continuous>  rifaximin 550 milliGRAM(s) Oral two times a day  spironolactone 25 milliGRAM(s) Oral two times a day                            10.6   7.9   )-----------( 246      ( 03 Mar 2019 06:31 )             33.0       Hemoglobin: 10.6 g/dL (03-03 @ 06:31)  Hemoglobin: 9.8 g/dL (03-02 @ 01:54)  Hemoglobin: 10.1 g/dL (03-01 @ 06:25)  Hemoglobin: 9.9 g/dL (02-28 @ 15:49)  Hemoglobin: 9.0 g/dL (02-28 @ 09:29)      03-03    122<L>  |  75<L>  |  24<H>  ----------------------------<  99  4.1   |  31  |  1.05    Ca    7.9<L>      03 Mar 2019 06:31  Phos  4.4     03-03  Mg     2.2     03-03    TPro  7.4  /  Alb  3.4  /  TBili  4.3<H>  /  DBili  x   /  AST  59<H>  /  ALT  27  /  AlkPhos  374<H>  03-03    Creatinine Trend: 1.05<--, 1.10<--, 1.15<--, 1.24<--, 1.20<--, 1.27<--    COAGS: PT/INR - ( 03 Mar 2019 06:31 )   PT: 27.5 sec;   INR: 2.33 ratio         PTT - ( 03 Mar 2019 06:31 )  PTT:32.4 sec          T(C): 37.2 (03-03-19 @ 06:00), Max: 37.4 (03-02-19 @ 12:00)  HR: 97 (03-03-19 @ 07:00) (97 - 109)  BP: 102/75 (03-03-19 @ 07:00) (91/61 - 132/113)  RR: 18 (03-03-19 @ 07:00) (14 - 31)  SpO2: 97% (03-03-19 @ 07:00) (92% - 99%)  Wt(kg): --    I&O's Summary    02 Mar 2019 07:01  -  03 Mar 2019 07:00  --------------------------------------------------------  IN: 1529.4 mL / OUT: 2900 mL / NET: -1370.6 mL        Gen: Appears well in NAD  HEENT:  (-)icterus (-)pallor  CV: N S1 S2 1/6 ROSALINDA (+)2 Pulses B/l  Resp:  Clear to ausculatation B/L, normal effort  GI: (+) BS Soft, NT, ND  Lymph:  (+)Edema, (-)obvious lymphadenopathy  Skin: Warm to touch, Normal turgor  Psych: Appropriate mood and affect        TELEMETRY: 	  V paced     ECG:  	Sinus Vpaced    RADIOLOGY:         CXR:  no infiltrate     ASSESSMENT/PLAN: 	63y Female  Severe  NICM MDT BIV ICD DM, Thyroid cancer CHF on cont. milrinone infusion via PICC, HTN, DM, who reports a few days of progressively worsening SOB at rest and w/ exertion acute on chronic decompensated systolic heart failure    - Eliquis for PAF  - ASA, statin   -  cont primacor, afterload reduction with hydralizine   - PO diuretics  - cont low dose BB , tolerating well   - Advanced CHF therapy per CHF team  D/W Dr Grimes pt seen and examined, no complaints, ROS - .     apixaban 5 milliGRAM(s) Oral every 12 hours  aspirin enteric coated 81 milliGRAM(s) Oral daily  calcitriol   Capsule 0.5 MICROGram(s) Oral daily  calcium acetate 667 milliGRAM(s) Oral four times a day with meals  calcium carbonate 1250 mG  + Vitamin D (OsCal 500 + D) 1 Tablet(s) Oral four times a day  chlorhexidine 4% Liquid 1 Application(s) Topical <User Schedule>  dextrose 40% Gel 15 Gram(s) Oral once PRN  dextrose 5%. 1000 milliLiter(s) IV Continuous <Continuous>  dextrose 50% Injectable 12.5 Gram(s) IV Push once  dextrose 50% Injectable 25 Gram(s) IV Push once  dextrose 50% Injectable 25 Gram(s) IV Push once  gabapentin 200 milliGRAM(s) Oral two times a day  glucagon  Injectable 1 milliGRAM(s) IntraMuscular once PRN  guaiFENesin   Syrup  (Sugar-Free) 200 milliGRAM(s) Oral every 6 hours PRN  hydrALAZINE 20 milliGRAM(s) Oral every 8 hours  insulin glargine Injectable (LANTUS) 12 Unit(s) SubCutaneous at bedtime  insulin lispro (HumaLOG) corrective regimen sliding scale   SubCutaneous three times a day before meals  insulin lispro (HumaLOG) corrective regimen sliding scale   SubCutaneous at bedtime  levothyroxine 175 MICROGram(s) Oral daily  magnesium oxide 400 milliGRAM(s) Oral three times a day with meals  metoprolol succinate ER 12.5 milliGRAM(s) Oral daily  milrinone Infusion 0.5 MICROgram(s)/kG/Min IV Continuous <Continuous>  rifaximin 550 milliGRAM(s) Oral two times a day  spironolactone 25 milliGRAM(s) Oral two times a day                            10.6   7.9   )-----------( 246      ( 03 Mar 2019 06:31 )             33.0       Hemoglobin: 10.6 g/dL (03-03 @ 06:31)  Hemoglobin: 9.8 g/dL (03-02 @ 01:54)  Hemoglobin: 10.1 g/dL (03-01 @ 06:25)  Hemoglobin: 9.9 g/dL (02-28 @ 15:49)  Hemoglobin: 9.0 g/dL (02-28 @ 09:29)      03-03    122<L>  |  75<L>  |  24<H>  ----------------------------<  99  4.1   |  31  |  1.05    Ca    7.9<L>      03 Mar 2019 06:31  Phos  4.4     03-03  Mg     2.2     03-03    TPro  7.4  /  Alb  3.4  /  TBili  4.3<H>  /  DBili  x   /  AST  59<H>  /  ALT  27  /  AlkPhos  374<H>  03-03    Creatinine Trend: 1.05<--, 1.10<--, 1.15<--, 1.24<--, 1.20<--, 1.27<--    COAGS: PT/INR - ( 03 Mar 2019 06:31 )   PT: 27.5 sec;   INR: 2.33 ratio         PTT - ( 03 Mar 2019 06:31 )  PTT:32.4 sec          T(C): 37.2 (03-03-19 @ 06:00), Max: 37.4 (03-02-19 @ 12:00)  HR: 97 (03-03-19 @ 07:00) (97 - 109)  BP: 102/75 (03-03-19 @ 07:00) (91/61 - 132/113)  RR: 18 (03-03-19 @ 07:00) (14 - 31)  SpO2: 97% (03-03-19 @ 07:00) (92% - 99%)  Wt(kg): --    I&O's Summary    02 Mar 2019 07:01  -  03 Mar 2019 07:00  --------------------------------------------------------  IN: 1529.4 mL / OUT: 2900 mL / NET: -1370.6 mL        Gen: Appears well in NAD  HEENT:  (-)icterus (-)pallor  CV: N S1 S2 1/6 ROSALINDA (+)2 Pulses B/l  Resp:  Clear to ausculatation B/L, normal effort  GI: (+) BS Soft, NT, ND  Lymph:  (+)Edema, (-)obvious lymphadenopathy  Skin: Warm to touch, Normal turgor  Psych: Appropriate mood and affect        TELEMETRY: 	  V paced     ECG:  	Sinus Vpaced    RADIOLOGY:         CXR:  no infiltrate     ASSESSMENT/PLAN: 	63y Female  Severe  NICM MDT BIV ICD DM, Thyroid cancer CHF on cont. milrinone infusion via PICC, HTN, DM, who reports a few days of progressively worsening SOB at rest and w/ exertion acute on chronic decompensated systolic heart failure    - Eliquis for PAF  - ASA, statin   -  cont primacor, afterload reduction with hydralizine   - PO diuretics  - cont low dose BB , tolerating well   - Advanced CHF therapy per CHF team

## 2019-03-03 NOTE — PROGRESS NOTE ADULT - SUBJECTIVE AND OBJECTIVE BOX
Admission date:  CHIEF COMPLAINT:  HPI:  64 yo F h/o Severe  Cardiomyopathy ,DM, Thyroid cancer CHF on cont. milrinone infusion via PICC, HTN, DM, who reports a few days of progressively worsening SOB at rest and w/ exertion as compared to her usual baseline.  Associated w/ bilateral LE non painful edema over the last 2-3 days. Pt. w/ chronic non productive cough at baseline but this has also worsened over the last 2 days. She denies hemoptysis. States that she had a couple episodes of vomiting which occurred after intense coughing. She experienced one episode of emesis yesterday evening after dinner which was described as non bloody mucus. She denies abdominal pain or discomfort. Last BM was this morning normal appearing. Denies CP. Denies pleuritic pain. No reports of fevers, chill, flank or back pain, urinary complaints. Visiting nurse today aware of patient's worsening cough and SOB contacted patients cardiologist and patient was referred to the ED for admission. (2019 21:59)    INTERVAL HISTORY: Patient seen and examined,     REVIEW OF SYSTEMS:    CONSTITUTIONAL: No weakness, fevers or chills  EYES/ENT: No visual changes;  No vertigo or throat pain   NECK: No pain or stiffness  RESPIRATORY: No cough, wheezing, hemoptysis; No shortness of breath  CARDIOVASCULAR: No chest pain or palpitations  GASTROINTESTINAL: No abdominal or epigastric pain. No nausea, vomiting, or hematemesis; No diarrhea or constipation. No melena or hematochezia.  GENITOURINARY: No dysuria, frequency or hematuria  NEUROLOGICAL: No numbness or weakness  SKIN: No itching, rashes      MEDICATIONS  (STANDING):  apixaban 5 milliGRAM(s) Oral every 12 hours  aspirin enteric coated 81 milliGRAM(s) Oral daily  calcitriol   Capsule 0.5 MICROGram(s) Oral daily  calcium acetate 667 milliGRAM(s) Oral four times a day with meals  calcium carbonate 1250 mG  + Vitamin D (OsCal 500 + D) 1 Tablet(s) Oral four times a day  chlorhexidine 4% Liquid 1 Application(s) Topical <User Schedule>  dextrose 5%. 1000 milliLiter(s) (50 mL/Hr) IV Continuous <Continuous>  dextrose 50% Injectable 12.5 Gram(s) IV Push once  dextrose 50% Injectable 25 Gram(s) IV Push once  dextrose 50% Injectable 25 Gram(s) IV Push once  gabapentin 200 milliGRAM(s) Oral two times a day  hydrALAZINE 20 milliGRAM(s) Oral every 8 hours  insulin glargine Injectable (LANTUS) 12 Unit(s) SubCutaneous at bedtime  insulin lispro (HumaLOG) corrective regimen sliding scale   SubCutaneous three times a day before meals  insulin lispro (HumaLOG) corrective regimen sliding scale   SubCutaneous at bedtime  levothyroxine 175 MICROGram(s) Oral daily  magnesium oxide 400 milliGRAM(s) Oral three times a day with meals  metoprolol succinate ER 12.5 milliGRAM(s) Oral daily  milrinone Infusion 0.5 MICROgram(s)/kG/Min (12.72 mL/Hr) IV Continuous <Continuous>  rifaximin 550 milliGRAM(s) Oral two times a day  spironolactone 25 milliGRAM(s) Oral two times a day    MEDICATIONS  (PRN):  dextrose 40% Gel 15 Gram(s) Oral once PRN Blood Glucose LESS THAN 70 milliGRAM(s)/deciliter  glucagon  Injectable 1 milliGRAM(s) IntraMuscular once PRN Glucose LESS THAN 70 milligrams/deciliter  guaiFENesin   Syrup  (Sugar-Free) 200 milliGRAM(s) Oral every 6 hours PRN Cough      Objective:  ICU Vital Signs Last 24 Hrs  T(C): 37.2 (03 Mar 2019 06:00), Max: 37.4 (02 Mar 2019 12:00)  T(F): 99 (03 Mar 2019 06:00), Max: 99.3 (02 Mar 2019 12:00)  HR: 99 (03 Mar 2019 06:00) (94 - 109)  BP: 120/81 (03 Mar 2019 06:00) (91/61 - 132/113)  BP(mean): 94 (03 Mar 2019 06:00) (69 - 121)  ABP: --  ABP(mean): --  RR: 14 (03 Mar 2019 06:00) (14 - 31)  SpO2: 99% (03 Mar 2019 06:00) (92% - 99%)           @ 07:01  -  - @ 07:00  --------------------------------------------------------  IN: 1529.4 mL / OUT: 2900 mL / NET: -1370.6 mL      Daily Weight in k.5 (02 Mar 2019 23:14)    PHYSICAL EXAM:  General: WN/WD NAD  Neurology: Awake, nonfocal, ENCARNACION x 4  Eyes: Scleras clear, PERRLA/ EOMI, Gross vision intact  ENT:Gross hearing intact, grossly patent pharynx, no stridor  Neck: Neck supple, trachea midline, No JVD,   Respiratory: CTA B/L, No wheezing, rales, rhonchi  CV: RRR, S1S2, no murmurs, rubs or gallops  Abdominal: Soft, NT, ND +BS,   Extremities: No edema, + peripheral pulses  Skin: No Rashes, Hematoma, Ecchymosis  Lymphatic: No Neck, axilla, groin LAD  Psych: Oriented x 3, normal affect        TELEMETRY: 101 bpm Paced    EKG:     IMAGING:  < from: TTE with Doppler (w/Cont) (18 @ 05:53) >  Conclusions: EF 9%  1. Tethered mitral valve leaflets with normal opening.  Mitral annular calcification and thickened  mitral leaflet  tips Moderate mitral regurgitation.  2. Calcified trileaflet aortic valve with normal opening.  3. Moderately dilated left atrium.  LA volume index = 43  cc/m2.  4. Eccentric left ventricular hypertrophy (dilated left  ventricle with normal relative wall thickness).  5. Severe global left ventricularsystolic dysfunction.  Endocardial visualization enhanced with intravenous  injection of Ultrasonic Enhancing Agent (Definity). No LV  thrombus.  Septal flattening consistent with right  ventricular overload.  6. Severe diastolic dysfunction with elevated filling  pressures  7. Severe right atrial enlargement.  8. Right ventricular enlargement with decreased right  ventricular systolic function.  A device wire is noted in  the right heart.  9. Dilated tricuspid annulus with malcoaptation of  tricuspid leaflets. Severe tricuspid regurgitation.    < end of copied text >    Labs:                          10.6   7.9   )-----------( 246      ( 03 Mar 2019 06:31 )             33.0     03-03    122<L>  |  75<L>  |  24<H>  ----------------------------<  99  4.1   |  31  |  1.05    Ca    7.9<L>      03 Mar 2019 06:31  Phos  4.4     -  Mg     2.2         TPro  7.4  /  Alb  3.4  /  TBili  4.3<H>  /  DBili  x   /  AST  59<H>  /  ALT  27  /  AlkPhos  374<H>      LIVER FUNCTIONS - ( 03 Mar 2019 06:31 )  Alb: 3.4 g/dL / Pro: 7.4 g/dL / ALK PHOS: 374 U/L / ALT: 27 U/L / AST: 59 U/L / GGT: x           PT/INR - ( 03 Mar 2019 06:31 )   PT: 27.5 sec;   INR: 2.33 ratio         PTT - ( 03 Mar 2019 06:31 )  PTT:32.4 sec        HEALTH ISSUES - PROBLEM Dx:  Type 2 diabetes mellitus without complication, with long-term current use of insulin: Type 2 diabetes mellitus without complication, with long-term current use of insulin  Respiratory infection: Respiratory infection  Hypokalemia: Hypokalemia  Anemia: Anemia  Hyponatremia: Hyponatremia  Hypocalcemia: Hypocalcemia  Coagulopathy: Coagulopathy  Hypothyroidism: Hypothyroidism  DM (diabetes mellitus): DM (diabetes mellitus)  Acute on chronic systolic congestive heart failure: Acute on chronic systolic congestive heart failure Admission date:  CHIEF COMPLAINT:  HPI:  64 yo F h/o Severe  Cardiomyopathy ,DM, Thyroid cancer CHF on cont. milrinone infusion via PICC, HTN, DM, who reports a few days of progressively worsening SOB at rest and w/ exertion as compared to her usual baseline.  Associated w/ bilateral LE non painful edema over the last 2-3 days. Pt. w/ chronic non productive cough at baseline but this has also worsened over the last 2 days. She denies hemoptysis. States that she had a couple episodes of vomiting which occurred after intense coughing. She experienced one episode of emesis yesterday evening after dinner which was described as non bloody mucus. She denies abdominal pain or discomfort. Last BM was this morning normal appearing. Denies CP. Denies pleuritic pain. No reports of fevers, chill, flank or back pain, urinary complaints. Visiting nurse today aware of patient's worsening cough and SOB contacted patients cardiologist and patient was referred to the ED for admission. (2019 21:59)    INTERVAL HISTORY: Patient seen and examined, denies CP, Dyspnea, +orthopnea, PND, and denies palpitations. OOB- in recliner comfortable.    REVIEW OF SYSTEMS:    CONSTITUTIONAL: No weakness, fevers or chills  EYES/ENT: No visual changes;  No vertigo or throat pain   NECK: No pain or stiffness  RESPIRATORY: No cough, wheezing, hemoptysis; + shortness of breath  CARDIOVASCULAR: No chest pain or palpitations  GASTROINTESTINAL: No abdominal or epigastric pain. No nausea, vomiting, or hematemesis; No diarrhea or constipation. No melena or hematochezia.  GENITOURINARY: No dysuria, frequency or hematuria  NEUROLOGICAL: No numbness or weakness  SKIN: No itching, rashes      MEDICATIONS  (STANDING):  apixaban 5 milliGRAM(s) Oral every 12 hours  aspirin enteric coated 81 milliGRAM(s) Oral daily  calcitriol   Capsule 0.5 MICROGram(s) Oral daily  calcium acetate 667 milliGRAM(s) Oral four times a day with meals  calcium carbonate 1250 mG  + Vitamin D (OsCal 500 + D) 1 Tablet(s) Oral four times a day  chlorhexidine 4% Liquid 1 Application(s) Topical <User Schedule>  dextrose 5%. 1000 milliLiter(s) (50 mL/Hr) IV Continuous <Continuous>  dextrose 50% Injectable 12.5 Gram(s) IV Push once  dextrose 50% Injectable 25 Gram(s) IV Push once  dextrose 50% Injectable 25 Gram(s) IV Push once  gabapentin 200 milliGRAM(s) Oral two times a day  hydrALAZINE 20 milliGRAM(s) Oral every 8 hours  insulin glargine Injectable (LANTUS) 12 Unit(s) SubCutaneous at bedtime  insulin lispro (HumaLOG) corrective regimen sliding scale   SubCutaneous three times a day before meals  insulin lispro (HumaLOG) corrective regimen sliding scale   SubCutaneous at bedtime  levothyroxine 175 MICROGram(s) Oral daily  magnesium oxide 400 milliGRAM(s) Oral three times a day with meals  metoprolol succinate ER 12.5 milliGRAM(s) Oral daily  milrinone Infusion 0.5 MICROgram(s)/kG/Min (12.72 mL/Hr) IV Continuous <Continuous>  rifaximin 550 milliGRAM(s) Oral two times a day  spironolactone 25 milliGRAM(s) Oral two times a day    MEDICATIONS  (PRN):  dextrose 40% Gel 15 Gram(s) Oral once PRN Blood Glucose LESS THAN 70 milliGRAM(s)/deciliter  glucagon  Injectable 1 milliGRAM(s) IntraMuscular once PRN Glucose LESS THAN 70 milligrams/deciliter  guaiFENesin   Syrup  (Sugar-Free) 200 milliGRAM(s) Oral every 6 hours PRN Cough      Objective:  ICU Vital Signs Last 24 Hrs  T(C): 37.2 (03 Mar 2019 06:00), Max: 37.4 (02 Mar 2019 12:00)  T(F): 99 (03 Mar 2019 06:00), Max: 99.3 (02 Mar 2019 12:00)  HR: 99 (03 Mar 2019 06:00) (94 - 109)  BP: 120/81 (03 Mar 2019 06:00) (91/61 - 132/113)  BP(mean): 94 (03 Mar 2019 06:00) (69 - 121)  ABP: --  ABP(mean): --  RR: 14 (03 Mar 2019 06:00) (14 - 31)  SpO2: 99% (03 Mar 2019 06:00) (92% - 99%)          - @ 07:01  -  - @ 07:00  --------------------------------------------------------  IN: 1529.4 mL / OUT: 2900 mL / NET: -1370.6 mL      Daily Weight in k.5 (02 Mar 2019 23:14)    PHYSICAL EXAM:  General: WN/WD NAD  Neurology: Awake, nonfocal, ENCARNACION x 4  Eyes: Scleras clear, PERRLA/ EOMI, Gross vision intact  ENT:Gross hearing intact, grossly patent pharynx, no stridor  Neck: Neck supple, trachea midline, No JVD,   Respiratory: CTA B/L, No wheezing, rales, rhonchi, productive cough  CV: RRR, S1S2, 1/6 ROSALINDA, rubs or gallops  Abdominal: Soft, NT, ND +BS,   Extremities: + edema, + peripheral pulses  Skin: No Rashes, Hematoma, Ecchymosis  Lymphatic: No Neck, axilla, groin LAD  Psych: Oriented x 3, normal affect        TELEMETRY: 101 bpm Paced    EKG:   < from: 12 Lead ECG (19 @ 08:24) >  Ventricular Rate 99 BPM    Atrial Rate 99 BPM    QRS Duration 138 ms    Q-T Interval 442 ms    QTC Calculation(Bezet) 567 ms    P Axis 70 degrees    R Axis 259 degrees    T Axis 66 degrees    Diagnosis Line ELECTRONIC VENTRICULAR PACEMAKER with atrial tracking      < end of copied text >    IMAGING:  < from: TTE with Doppler (w/Cont) (18 @ 05:53) >  Conclusions: EF 9%  1. Tethered mitral valve leaflets with normal opening.  Mitral annular calcification and thickened  mitral leaflet  tips Moderate mitral regurgitation.  2. Calcified trileaflet aortic valve with normal opening.  3. Moderately dilated left atrium.  LA volume index = 43  cc/m2.  4. Eccentric left ventricular hypertrophy (dilated left  ventricle with normal relative wall thickness).  5. Severe global left ventricularsystolic dysfunction.  Endocardial visualization enhanced with intravenous  injection of Ultrasonic Enhancing Agent (Definity). No LV  thrombus.  Septal flattening consistent with right  ventricular overload.  6. Severe diastolic dysfunction with elevated filling  pressures  7. Severe right atrial enlargement.  8. Right ventricular enlargement with decreased right  ventricular systolic function.  A device wire is noted in  the right heart.  9. Dilated tricuspid annulus with malcoaptation of  tricuspid leaflets. Severe tricuspid regurgitation.    < end of copied text >    Labs:                          10.6   7.9   )-----------( 246      ( 03 Mar 2019 06:31 )             33.0         122<L>  |  75<L>  |  24<H>  ----------------------------<  99  4.1   |  31  |  1.05    Ca    7.9<L>      03 Mar 2019 06:31  Phos  4.4       Mg     2.2         TPro  7.4  /  Alb  3.4  /  TBili  4.3<H>  /  DBili  x   /  AST  59<H>  /  ALT  27  /  AlkPhos  374<H>      LIVER FUNCTIONS - ( 03 Mar 2019 06:31 )  Alb: 3.4 g/dL / Pro: 7.4 g/dL / ALK PHOS: 374 U/L / ALT: 27 U/L / AST: 59 U/L / GGT: x           PT/INR - ( 03 Mar 2019 06:31 )   PT: 27.5 sec;   INR: 2.33 ratio         PTT - ( 03 Mar 2019 06:31 )  PTT:32.4 sec        HEALTH ISSUES - PROBLEM Dx:  Type 2 diabetes mellitus without complication, with long-term current use of insulin: Type 2 diabetes mellitus without complication, with long-term current use of insulin  Respiratory infection: Respiratory infection  Hypokalemia: Hypokalemia  Anemia: Anemia  Hyponatremia: Hyponatremia  Hypocalcemia: Hypocalcemia  Coagulopathy: Coagulopathy  Hypothyroidism: Hypothyroidism  DM (diabetes mellitus): DM (diabetes mellitus)  Acute on chronic systolic congestive heart failure: Acute on chronic systolic congestive heart failure

## 2019-03-03 NOTE — CHART NOTE - NSCHARTNOTEFT_GEN_A_CORE
====================  CCU MIDNIGHT ROUNDS  ====================    DELIA SERRANO  [id number]    ====================  SUMMARY: HPI:  62 yo F h/o Severe  Cardiomyopathy ,DM, Thyroid cancer CHF on cont. milrinone infusion via PICC, HTN, DM, who reports a few days of progressively worsening SOB at rest and w/ exertion as compared to her usual baseline.  Associated w/ bilateral LE non painful edema over the last 2-3 days. Pt. w/ chronic non productive cough at baseline but this has also worsened over the last 2 days. She denies hemoptysis. States that she had a couple episodes of vomiting which occurred after intense coughing. She experienced one episode of emesis yesterday evening after dinner which was described as non bloody mucus. She denies abdominal pain or discomfort. Last BM was this morning normal appearing. Denies CP. Denies pleuritic pain. No reports of fevers, chill, flank or back pain, urinary complaints. Visiting nurse today aware of patient's worsening cough and SOB contacted patients cardiologist and patient was referred to the ED for admission. (27 Feb 2019 21:59)    ====================        ====================  NEW EVENTS:  BMP being sent  reports doing well with no complaints  ====================        ====================  VITALS (Last 12 hrs):  ====================    T(C): 36.6 (03-03-19 @ 12:00), Max: 36.6 (03-03-19 @ 12:00)  HR: 100 (03-03-19 @ 21:00) (90 - 101)  BP: 110/88 (03-03-19 @ 21:00) (106/93 - 122/81)  BP(mean): 96 (03-03-19 @ 21:00) (80 - 101)  RR: 14 (03-03-19 @ 21:00) (13 - 17)  SpO2: 99% (03-03-19 @ 21:00) (95% - 100%)      I&O's Summary    02 Mar 2019 07:01  -  03 Mar 2019 07:00  --------------------------------------------------------  IN: 1542.1 mL / OUT: 2900 mL / NET: -1357.9 mL    03 Mar 2019 07:01  -  03 Mar 2019 22:00  --------------------------------------------------------  IN: 977.8 mL / OUT: 1500 mL / NET: -522.2 mL        ====================  PLAN:  # ADHF  - c/w milrinone @ home dose  - off bumex gtt- negative 13L for hospital stay; monitor strict I/Os, lytes, weights. Replete K aggressively  - not a candidate for advanced therapies; poorly compliant   - + RVP; continue supportive care and abx  - continue spironolactone, hydralazine, BB  - continue eliquis for afib  - monitor sodium levels  ====================    HEALTH ISSUES - PROBLEM Dx:  Type 2 diabetes mellitus without complication, with long-term current use of insulin: Type 2 diabetes mellitus without complication, with long-term current use of insulin  Respiratory infection: Respiratory infection  Hypokalemia: Hypokalemia  Anemia: Anemia  Hyponatremia: Hyponatremia  Hypocalcemia: Hypocalcemia  Coagulopathy: Coagulopathy  Hypothyroidism: Hypothyroidism  DM (diabetes mellitus): DM (diabetes mellitus)  Acute on chronic systolic congestive heart failure: Acute on chronic systolic congestive heart failure        HEALTH ISSUES - R/O PROBLEM Dx:  Abnormal LFTs (liver function tests): R/O Abnormal LFTs (liver function tests)      GERALD Hale #[id number]

## 2019-03-03 NOTE — PROGRESS NOTE ADULT - ASSESSMENT
ASSESSMENT/RECOMMEND  The patient is a 63-year-old female with the past medical history of nonischemic cardiomyopathy, diabetes, presumed parathyroid removal, thyroid cancer presents with coronavirus.  She has an evidence of hyponatremia, hypomagnesemia and hypocalcemia.  The patient also with acute kidney injury; suspect the coronavirus tripped her over into (at minimum)acute right ventricular dysfunction.  Chest x-ray at present is clear.       (1)Endocrine.  The patient likely had her parathyroid gland removed at the time of the thyroid removal.  Parathyroid hormone historically has been low in light of hypocalcemia.  On calcium pills orally four times a day She could not tolerate the magnesium oxide 400 mg three times a day.    (2)Hyponatremia  (3)Infectious Disease.  Supportive care.  The patient with positive RVP panel.  (4)Cardiology.  Milrinone dose per heart failure.  Agree with Bumex drip.    JANUSZ ChristieC  [facility]  [phone] ASSESSMENT/RECOMMEND  The patient is a 63-year-old female with the past medical history of nonischemic cardiomyopathy, diabetes, presumed parathyroid removal, thyroid cancer presents with coronavirus.  She has an evidence of hyponatremia, hypomagnesemia and hypocalcemia.  The patient also with acute kidney injury; suspect the coronavirus tripped her over into (at minimum)acute right ventricular dysfunction.  Chest x-ray at present is clear.       (1)Endocrine.  The patient likely had her parathyroid gland removed at the time of the thyroid removal.  Parathyroid hormone historically has been low in light of hypocalcemia.  On calcium pills orally four times a day She could not tolerate the magnesium oxide 400 mg three times a day.    (2)Hyponatremia; monitor   (3)Infectious Disease.  Supportive care.  The patient with positive RVP panel.  (4)Cardiology.  Milrinone dose per heart failure.  Agree with Bumex drip.    D/W Dr. Yaneth Carver, NP-C  [facility]  [phone]

## 2019-03-03 NOTE — PROGRESS NOTE ADULT - SUBJECTIVE AND OBJECTIVE BOX
NEPHROLOGY - NSN    Patient seen and examined sitting in bed. Pt reports feeling fine, no complaints offered.     MEDICATIONS  (STANDING):  apixaban 5 milliGRAM(s) Oral every 12 hours  aspirin enteric coated 81 milliGRAM(s) Oral daily  calcitriol   Capsule 0.5 MICROGram(s) Oral daily  calcium acetate 667 milliGRAM(s) Oral four times a day with meals  calcium carbonate 1250 mG  + Vitamin D (OsCal 500 + D) 1 Tablet(s) Oral four times a day  chlorhexidine 4% Liquid 1 Application(s) Topical <User Schedule>  dextrose 5%. 1000 milliLiter(s) (50 mL/Hr) IV Continuous <Continuous>  dextrose 50% Injectable 12.5 Gram(s) IV Push once  dextrose 50% Injectable 25 Gram(s) IV Push once  dextrose 50% Injectable 25 Gram(s) IV Push once  gabapentin 200 milliGRAM(s) Oral two times a day  hydrALAZINE 20 milliGRAM(s) Oral every 8 hours  insulin glargine Injectable (LANTUS) 12 Unit(s) SubCutaneous at bedtime  insulin lispro (HumaLOG) corrective regimen sliding scale   SubCutaneous three times a day before meals  insulin lispro (HumaLOG) corrective regimen sliding scale   SubCutaneous at bedtime  levothyroxine 175 MICROGram(s) Oral daily  magnesium oxide 400 milliGRAM(s) Oral three times a day with meals  metoprolol succinate ER 12.5 milliGRAM(s) Oral daily  milrinone Infusion 0.5 MICROgram(s)/kG/Min (12.72 mL/Hr) IV Continuous <Continuous>  rifaximin 550 milliGRAM(s) Oral two times a day  spironolactone 25 milliGRAM(s) Oral two times a day    VITALS:  T(C): , Max: 37.4 (03-02-19 @ 12:00)  T(F): , Max: 99.3 (03-02-19 @ 12:00)  HR: 97 (03-03-19 @ 09:00)  BP: 111/95 (03-03-19 @ 09:00)  BP(mean): 101 (03-03-19 @ 09:00)  RR: 17 (03-03-19 @ 09:00)  SpO2: 98% (03-03-19 @ 09:00)    I and O's:    03-02 @ 07:01 - 03-03 @ 07:00  --------------------------------------------------------  IN: 1542.1 mL / OUT: 2900 mL / NET: -1357.9 mL    03-03 @ 07:01  - 03-03 @ 09:58  --------------------------------------------------------  IN: 225.4 mL / OUT: 200 mL / NET: 25.4 mL    REVIEW OF SYSTEMS:  Full ROS done and were negative unless otherwise indicated in HPI/assessment.     PHYSICAL EXAM:  Constitutional: NAD  Neck:  +  JVD  Respiratory: reduced breath sounds  Cardiovascular: S1 and S2  Gastrointestinal: BS+, soft, NT/ND  Extremities: +  peripheral edema  Neurological: A/O x 3, no focal deficits  Psychiatric: Normal mood, normal affect  : No Gustafson  Skin: No rashes  Access: Not applicable    LABS:                        10.6   7.9   )-----------( 246      ( 03 Mar 2019 06:31 )             33.0     03-03    122<L>  |  75<L>  |  24<H>  ----------------------------<  99  4.1   |  31  |  1.05    Ca    7.9<L>      03 Mar 2019 06:31  Phos  4.4     03-03  Mg     2.2     03-03    TPro  7.4  /  Alb  3.4  /  TBili  4.3<H>  /  DBili  x   /  AST  59<H>  /  ALT  27  /  AlkPhos  374<H>  03-03

## 2019-03-03 NOTE — PROGRESS NOTE ADULT - ASSESSMENT
64 yo F h/o Severe  Cardiomyopathy ,DM, Thyroid cancer CHF on cont. milrinone infusion via PICC, HTN, DM p/w acute on chronic CHF exacerbation with hospital course c/b concern for sepsis.    NEURO:   ZANDRA    CV:  CHF exacerbation  - on milrinone at home, continue home dose  - hold metolazone per HF recs  - hydralazine 20mg TID   - c/w spironolactone  - c/w metoprolol  - daily weights  - fluid restrict  - f/u HF team recs  - f/u EP to interrogate medtronic biV pacemaker    Pulm:  - diuresis as above  - RVP negative    :  - elevated Cr likely prerenal 2/2 intravascular depletion  - improving Cr, will continue to monitor Cr  - monitor UOP    - hyponatremia likely 2/2 hypervolemic hypovolemia  - continue to monitor, improving    GI:  - DASH diet    ID:  - previously with fever, leukocytosis, mildly hypotensive, repeat RVP negative  - clinically improved  - positive for coronavirus  - BCx NGTD from 2/28  - will stop vanc/zosyn/ceftriaxone 62 yo F h/o Severe  Cardiomyopathy ,DM, Thyroid cancer CHF on cont. milrinone infusion via PICC, HTN, DM p/w acute on chronic CHF exacerbation with hospital course c/b concern for sepsis.    NEURO:   ZANDRA    CV:  CHF exacerbation  - on milrinone at home, continue home dose  - hold metolazone per HF recs  - hydralazine 20mg TID   - c/w spironolactone  - c/w metoprolol  - daily weights  - fluid restrict  - f/u HF team recs  - f/u EP to interrogate medtronic biV pacemaker    Pulm:  - diuresis as above  - RVP negative    :  - elevated Cr likely prerenal 2/2 intravascular depletion  - improving Cr, will continue to monitor Cr  - monitor UOP    - hyponatremia likely 2/2 hypervolemic hypovolemia  - continue to monitor, improving    GI:  - DASH diet    ID:  - previously with fever, leukocytosis, mildly hypotensive, repeat RVP negative  - clinically improved  - positive for coronavirus  - BCx NGTD from 2/28  - 2/28 D/C  vanc/zosyn/ceftriaxone

## 2019-03-03 NOTE — PROVIDER CONTACT NOTE (CRITICAL VALUE NOTIFICATION) - ASSESSMENT
Pt intubated / unable to follow commands see Vitals
no s/s of distress noted
pt remains free from respiratory distress, MAP remains >60

## 2019-03-03 NOTE — PROGRESS NOTE ADULT - ATTENDING COMMENTS
EP ATTENDING    Agree with above. Recommend lifelong anticoagulation for PAF with elevated chads-vasc. Supportive care as per CCU and CHF/cardiology appreciated.

## 2019-03-03 NOTE — PROGRESS NOTE ADULT - SUBJECTIVE AND OBJECTIVE BOX
INTERVAL HPI/OVERNIGHT EVENTS: I have upset stomach .   Vital Signs Last 24 Hrs  T(C): 37.2 (03 Mar 2019 06:00), Max: 37.4 (02 Mar 2019 12:00)  T(F): 99 (03 Mar 2019 06:00), Max: 99.3 (02 Mar 2019 12:00)  HR: 96 (03 Mar 2019 10:00) (96 - 109)  BP: 102/73 (03 Mar 2019 10:00) (91/61 - 132/113)  BP(mean): 84 (03 Mar 2019 10:00) (69 - 121)  RR: 18 (03 Mar 2019 10:00) (14 - 31)  SpO2: 96% (03 Mar 2019 10:00) (92% - 99%)  I&O's Summary    02 Mar 2019 07:01  -  03 Mar 2019 07:00  --------------------------------------------------------  IN: 1542.1 mL / OUT: 2900 mL / NET: -1357.9 mL    03 Mar 2019 07:01  -  03 Mar 2019 11:20  --------------------------------------------------------  IN: 238.1 mL / OUT: 200 mL / NET: 38.1 mL      MEDICATIONS  (STANDING):  apixaban 5 milliGRAM(s) Oral every 12 hours  aspirin enteric coated 81 milliGRAM(s) Oral daily  calcitriol   Capsule 0.5 MICROGram(s) Oral daily  calcium acetate 667 milliGRAM(s) Oral four times a day with meals  calcium carbonate 1250 mG  + Vitamin D (OsCal 500 + D) 1 Tablet(s) Oral four times a day  chlorhexidine 4% Liquid 1 Application(s) Topical <User Schedule>  dextrose 5%. 1000 milliLiter(s) (50 mL/Hr) IV Continuous <Continuous>  dextrose 50% Injectable 12.5 Gram(s) IV Push once  dextrose 50% Injectable 25 Gram(s) IV Push once  dextrose 50% Injectable 25 Gram(s) IV Push once  gabapentin 200 milliGRAM(s) Oral two times a day  hydrALAZINE 20 milliGRAM(s) Oral every 8 hours  insulin glargine Injectable (LANTUS) 12 Unit(s) SubCutaneous at bedtime  insulin lispro (HumaLOG) corrective regimen sliding scale   SubCutaneous three times a day before meals  insulin lispro (HumaLOG) corrective regimen sliding scale   SubCutaneous at bedtime  levothyroxine 175 MICROGram(s) Oral daily  magnesium oxide 400 milliGRAM(s) Oral three times a day with meals  metoprolol succinate ER 12.5 milliGRAM(s) Oral daily  milrinone Infusion 0.5 MICROgram(s)/kG/Min (12.72 mL/Hr) IV Continuous <Continuous>  rifaximin 550 milliGRAM(s) Oral two times a day  spironolactone 25 milliGRAM(s) Oral two times a day    MEDICATIONS  (PRN):  dextrose 40% Gel 15 Gram(s) Oral once PRN Blood Glucose LESS THAN 70 milliGRAM(s)/deciliter  glucagon  Injectable 1 milliGRAM(s) IntraMuscular once PRN Glucose LESS THAN 70 milligrams/deciliter  guaiFENesin   Syrup  (Sugar-Free) 200 milliGRAM(s) Oral every 6 hours PRN Cough    LABS:                        10.6   7.9   )-----------( 246      ( 03 Mar 2019 06:31 )             33.0     03-03    122<L>  |  75<L>  |  24<H>  ----------------------------<  99  4.1   |  31  |  1.05    Ca    7.9<L>      03 Mar 2019 06:31  Phos  4.4     03-03  Mg     2.2     03-03    TPro  7.4  /  Alb  3.4  /  TBili  4.3<H>  /  DBili  x   /  AST  59<H>  /  ALT  27  /  AlkPhos  374<H>  03-03    PT/INR - ( 03 Mar 2019 06:31 )   PT: 27.5 sec;   INR: 2.33 ratio         PTT - ( 03 Mar 2019 06:31 )  PTT:32.4 sec    CAPILLARY BLOOD GLUCOSE      POCT Blood Glucose.: 150 mg/dL (03 Mar 2019 08:06)  POCT Blood Glucose.: 206 mg/dL (02 Mar 2019 21:15)  POCT Blood Glucose.: 186 mg/dL (02 Mar 2019 17:35)  POCT Blood Glucose.: 184 mg/dL (02 Mar 2019 12:47)          REVIEW OF SYSTEMS:  CONSTITUTIONAL: No fever, weight loss, or fatigue  EYES: No eye pain, visual disturbances, or discharge  ENMT:  No difficulty hearing, tinnitus, vertigo; No sinus or throat pain  RESPIRATORY: No cough, wheezing, chills or hemoptysis; No shortness of breath  CARDIOVASCULAR: No chest pain, palpitations, dizziness, but  leg swelling  GASTROINTESTINAL: No abdominal or epigastric pain. No nausea, vomiting, or hematemesis; No diarrhea or constipation. No melena or hematochezia.  GENITOURINARY: No dysuria, frequency, hematuria, or incontinence  NEUROLOGICAL: No headaches, memory loss, loss of strength, numbness, or tremors    Consultant(s) Notes Reviewed:  [x ] YES  [ ] NO    PHYSICAL EXAM:  GENERAL: NAD, well-groomed, well-developed, not in any distress ,  HEAD:  Atraumatic, Normocephalic  EYES: EOMI, PERRLA, conjunctiva and sclera clear  ENMT: No tonsillar erythema, exudates, or enlargement; Moist mucous membranes, Good dentition, No lesions  NECK: JVD   NERVOUS SYSTEM:  Alert & Oriented X3, No focal deficit   CHEST/LUNG: Good air entry bilateral with no  rales, rhonchi, wheezing, or rubs  HEART: Regular rate and rhythm; No murmurs, rubs, or gallops  ABDOMEN: Soft, Nontender, Nondistended; Bowel sounds present  EXTREMITIES:  2+ Peripheral Pulses, No clubbing, cyanosis, or edema      Care Discussed with Consultants/Other Providers [ x] YES  [ ] NO

## 2019-03-03 NOTE — PROGRESS NOTE ADULT - ASSESSMENT
64 yo F h/o Severe  Cardiomyopathy ,DM, Thyroid cancer CHF on cont. milrinone infusion via PICC, HTN, DM, who reports a few days of progressively worsening SOB at rest and w/ exertion as compared to her usual baseline.  Associated w/ bilateral LE non painful edema over the last 2-3 days. Pt. w/ chronic non productive cough at baseline but this has also worsened over the last 2 days. She denies hemoptysis. States that she had a couple episodes of vomiting which occurred after intense coughing. She experienced one episode of emesis yesterday evening after dinner which was described as non bloody mucus. She denies abdominal pain or discomfort. Last BM was this morning normal appearing. Denies CP. Denies pleuritic pain. No reports of fevers, chill, flank or back pain, urinary complaints. Visiting nurse today aware of patient's worsening cough and SOB contacted patients cardiologist and patient was referred to the ED for admission.     Problem/Plan - 1:  ·  Problem: Acute on chronic systolic congestive heart failure.  Plan: Milrinone and Bumex infusion  and PO spironolactone. Heart Failure  team and cardiology helping.      Problem/Plan - 2:  ·  Problem:  Abnormal LFTs (liver function tests).  Plan: Likely sec to Liver congestion . Will check Ammonia level in AM . Will consult Hepatology if AM labs are worse.      Problem/Plan - 3:  ·  Problem: DM (diabetes mellitus).  Plan: Insulin for now. Sugars in good range.      Problem/Plan - 4:  ·  Problem: Hypothyroidism.  Plan: Synthroid home dose.      Problem/Plan - 5:  ·  Problem: Coagulopathy.  Plan: Vitamin K . Sec to liver congestion .      Problem/Plan - 6:  Problem: Hypocalcemia. Plan: Replacing.     Problem/Plan - 7:  ·  Problem: Hyponatremia.  Plan: Renal helping.      Problem/Plan - 8:  ·  Problem: Anemia.  Plan: Watching CBC.      Problem/Plan - 9:  ·  Problem: Fever .  Plan: Likely Viral Corona but has high ESR&CRP. Cultures negative . Cough suppressant . 64 yo F h/o Severe  Cardiomyopathy ,DM, Thyroid cancer CHF on cont. milrinone infusion via PICC, HTN, DM, who reports a few days of progressively worsening SOB at rest and w/ exertion as compared to her usual baseline.  Associated w/ bilateral LE non painful edema over the last 2-3 days. Pt. w/ chronic non productive cough at baseline but this has also worsened over the last 2 days. She denies hemoptysis. States that she had a couple episodes of vomiting which occurred after intense coughing. She experienced one episode of emesis yesterday evening after dinner which was described as non bloody mucus. She denies abdominal pain or discomfort. Last BM was this morning normal appearing. Denies CP. Denies pleuritic pain. No reports of fevers, chill, flank or back pain, urinary complaints. Visiting nurse today aware of patient's worsening cough and SOB contacted patients cardiologist and patient was referred to the ED for admission.     Problem/Plan - 1:  ·  Problem: Acute on chronic systolic congestive heart failure.  Plan: Milrinone and Bumex infusion  and PO spironolactone. Heart Failure  team and cardiology helping.      Problem/Plan - 2:  ·  Problem:  Abnormal LFTs (liver function tests).  Plan: Likely sec to Liver congestion . LFT trending down.      Problem/Plan - 3:  ·  Problem: DM (diabetes mellitus).  Plan: Insulin for now. Sugars in good range.      Problem/Plan - 4:  ·  Problem: Hypothyroidism.  Plan: Synthroid home dose.      Problem/Plan - 5:  ·  Problem: Coagulopathy.  Plan: Vitamin K . Sec to liver congestion .      Problem/Plan - 6:  Problem: Hypocalcemia. Plan: Replacing.     Problem/Plan - 7:  ·  Problem: Hyponatremia.  Plan: Renal helping.      Problem/Plan - 8:  ·  Problem: Anemia.  Plan: Watching CBC.      Problem/Plan - 9:  ·  Problem: Fever .  Plan: Likely Viral Corona but has high ESR&CRP. Cultures negative . Cough suppressant .

## 2019-03-04 ENCOUNTER — INBOUND DOCUMENT (OUTPATIENT)
Age: 64
End: 2019-03-04

## 2019-03-04 VITALS
RESPIRATION RATE: 15 BRPM | DIASTOLIC BLOOD PRESSURE: 71 MMHG | SYSTOLIC BLOOD PRESSURE: 101 MMHG | OXYGEN SATURATION: 96 % | HEART RATE: 101 BPM

## 2019-03-04 LAB
ALBUMIN SERPL ELPH-MCNC: 2.9 G/DL — LOW (ref 3.3–5)
ALP SERPL-CCNC: 428 U/L — HIGH (ref 40–120)
ALT FLD-CCNC: 42 U/L — SIGNIFICANT CHANGE UP (ref 10–45)
ANION GAP SERPL CALC-SCNC: 18 MMOL/L — HIGH (ref 5–17)
APTT BLD: 32.3 SEC — SIGNIFICANT CHANGE UP (ref 27.5–36.3)
AST SERPL-CCNC: 95 U/L — HIGH (ref 10–40)
BILIRUB SERPL-MCNC: 3.5 MG/DL — HIGH (ref 0.2–1.2)
BUN SERPL-MCNC: 20 MG/DL — SIGNIFICANT CHANGE UP (ref 7–23)
CALCIUM SERPL-MCNC: 8.5 MG/DL — SIGNIFICANT CHANGE UP (ref 8.4–10.5)
CHLORIDE SERPL-SCNC: 82 MMOL/L — LOW (ref 96–108)
CO2 SERPL-SCNC: 25 MMOL/L — SIGNIFICANT CHANGE UP (ref 22–31)
CREAT SERPL-MCNC: 0.75 MG/DL — SIGNIFICANT CHANGE UP (ref 0.5–1.3)
GLUCOSE BLDC GLUCOMTR-MCNC: 111 MG/DL — HIGH (ref 70–99)
GLUCOSE BLDC GLUCOMTR-MCNC: 113 MG/DL — HIGH (ref 70–99)
GLUCOSE BLDC GLUCOMTR-MCNC: 212 MG/DL — HIGH (ref 70–99)
GLUCOSE SERPL-MCNC: 131 MG/DL — HIGH (ref 70–99)
HCT VFR BLD CALC: 32.4 % — LOW (ref 34.5–45)
HGB BLD-MCNC: 10.6 G/DL — LOW (ref 11.5–15.5)
INR BLD: 2.43 RATIO — HIGH (ref 0.88–1.16)
MAGNESIUM SERPL-MCNC: 2 MG/DL — SIGNIFICANT CHANGE UP (ref 1.6–2.6)
MCHC RBC-ENTMCNC: 24.1 PG — LOW (ref 27–34)
MCHC RBC-ENTMCNC: 32.8 GM/DL — SIGNIFICANT CHANGE UP (ref 32–36)
MCV RBC AUTO: 73.6 FL — LOW (ref 80–100)
PHOSPHATE SERPL-MCNC: 3.5 MG/DL — SIGNIFICANT CHANGE UP (ref 2.5–4.5)
PLATELET # BLD AUTO: 185 K/UL — SIGNIFICANT CHANGE UP (ref 150–400)
POTASSIUM SERPL-MCNC: 4.3 MMOL/L — SIGNIFICANT CHANGE UP (ref 3.5–5.3)
POTASSIUM SERPL-SCNC: 4.3 MMOL/L — SIGNIFICANT CHANGE UP (ref 3.5–5.3)
PROT SERPL-MCNC: 7.3 G/DL — SIGNIFICANT CHANGE UP (ref 6–8.3)
PROTHROM AB SERPL-ACNC: 28.5 SEC — HIGH (ref 10–12.9)
RBC # BLD: 4.4 M/UL — SIGNIFICANT CHANGE UP (ref 3.8–5.2)
RBC # FLD: 22.4 % — HIGH (ref 10.3–14.5)
SODIUM SERPL-SCNC: 125 MMOL/L — LOW (ref 135–145)
WBC # BLD: 8.2 K/UL — SIGNIFICANT CHANGE UP (ref 3.8–10.5)
WBC # FLD AUTO: 8.2 K/UL — SIGNIFICANT CHANGE UP (ref 3.8–10.5)

## 2019-03-04 PROCEDURE — 84295 ASSAY OF SERUM SODIUM: CPT

## 2019-03-04 PROCEDURE — 85730 THROMBOPLASTIN TIME PARTIAL: CPT

## 2019-03-04 PROCEDURE — 83605 ASSAY OF LACTIC ACID: CPT

## 2019-03-04 PROCEDURE — 93010 ELECTROCARDIOGRAM REPORT: CPT

## 2019-03-04 PROCEDURE — 82330 ASSAY OF CALCIUM: CPT

## 2019-03-04 PROCEDURE — 87798 DETECT AGENT NOS DNA AMP: CPT

## 2019-03-04 PROCEDURE — 93005 ELECTROCARDIOGRAM TRACING: CPT

## 2019-03-04 PROCEDURE — 83880 ASSAY OF NATRIURETIC PEPTIDE: CPT

## 2019-03-04 PROCEDURE — 86140 C-REACTIVE PROTEIN: CPT

## 2019-03-04 PROCEDURE — 80053 COMPREHEN METABOLIC PANEL: CPT

## 2019-03-04 PROCEDURE — 84100 ASSAY OF PHOSPHORUS: CPT

## 2019-03-04 PROCEDURE — 86803 HEPATITIS C AB TEST: CPT

## 2019-03-04 PROCEDURE — 85014 HEMATOCRIT: CPT

## 2019-03-04 PROCEDURE — 99233 SBSQ HOSP IP/OBS HIGH 50: CPT | Mod: GC

## 2019-03-04 PROCEDURE — 83690 ASSAY OF LIPASE: CPT

## 2019-03-04 PROCEDURE — 82803 BLOOD GASES ANY COMBINATION: CPT

## 2019-03-04 PROCEDURE — 87486 CHLMYD PNEUM DNA AMP PROBE: CPT

## 2019-03-04 PROCEDURE — 82947 ASSAY GLUCOSE BLOOD QUANT: CPT

## 2019-03-04 PROCEDURE — 71046 X-RAY EXAM CHEST 2 VIEWS: CPT

## 2019-03-04 PROCEDURE — 84132 ASSAY OF SERUM POTASSIUM: CPT

## 2019-03-04 PROCEDURE — 82962 GLUCOSE BLOOD TEST: CPT

## 2019-03-04 PROCEDURE — 87633 RESP VIRUS 12-25 TARGETS: CPT

## 2019-03-04 PROCEDURE — 80048 BASIC METABOLIC PNL TOTAL CA: CPT

## 2019-03-04 PROCEDURE — 85027 COMPLETE CBC AUTOMATED: CPT

## 2019-03-04 PROCEDURE — 86038 ANTINUCLEAR ANTIBODIES: CPT

## 2019-03-04 PROCEDURE — 82140 ASSAY OF AMMONIA: CPT

## 2019-03-04 PROCEDURE — 82435 ASSAY OF BLOOD CHLORIDE: CPT

## 2019-03-04 PROCEDURE — 85610 PROTHROMBIN TIME: CPT

## 2019-03-04 PROCEDURE — 87040 BLOOD CULTURE FOR BACTERIA: CPT

## 2019-03-04 PROCEDURE — 71045 X-RAY EXAM CHEST 1 VIEW: CPT

## 2019-03-04 PROCEDURE — 96374 THER/PROPH/DIAG INJ IV PUSH: CPT

## 2019-03-04 PROCEDURE — 84484 ASSAY OF TROPONIN QUANT: CPT

## 2019-03-04 PROCEDURE — 99285 EMERGENCY DEPT VISIT HI MDM: CPT | Mod: 25

## 2019-03-04 PROCEDURE — 85652 RBC SED RATE AUTOMATED: CPT

## 2019-03-04 PROCEDURE — 86431 RHEUMATOID FACTOR QUANT: CPT

## 2019-03-04 PROCEDURE — 87581 M.PNEUMON DNA AMP PROBE: CPT

## 2019-03-04 PROCEDURE — 83735 ASSAY OF MAGNESIUM: CPT

## 2019-03-04 PROCEDURE — 84145 PROCALCITONIN (PCT): CPT

## 2019-03-04 RX ORDER — APIXABAN 2.5 MG/1
1 TABLET, FILM COATED ORAL
Qty: 60 | Refills: 0 | OUTPATIENT
Start: 2019-03-04 | End: 2019-04-02

## 2019-03-04 RX ORDER — BUMETANIDE 0.25 MG/ML
4 INJECTION INTRAMUSCULAR; INTRAVENOUS ONCE
Qty: 0 | Refills: 0 | Status: COMPLETED | OUTPATIENT
Start: 2019-03-04 | End: 2019-03-04

## 2019-03-04 RX ORDER — HYDRALAZINE HCL 50 MG
2 TABLET ORAL
Qty: 180 | Refills: 0 | OUTPATIENT
Start: 2019-03-04 | End: 2019-04-02

## 2019-03-04 RX ORDER — SPIRONOLACTONE 25 MG/1
1 TABLET, FILM COATED ORAL
Qty: 60 | Refills: 0 | OUTPATIENT
Start: 2019-03-04 | End: 2019-04-02

## 2019-03-04 RX ORDER — MAGNESIUM OXIDE 400 MG ORAL TABLET 241.3 MG
1 TABLET ORAL
Qty: 90 | Refills: 0 | OUTPATIENT
Start: 2019-03-04 | End: 2019-04-02

## 2019-03-04 RX ORDER — MILRINONE LACTATE 1 MG/ML
0.5 INJECTION, SOLUTION INTRAVENOUS
Qty: 1 | Refills: 0 | OUTPATIENT

## 2019-03-04 RX ORDER — HYDRALAZINE HCL 50 MG
3 TABLET ORAL
Qty: 0 | Refills: 0 | COMMUNITY
Start: 2019-03-04 | End: 2019-04-02

## 2019-03-04 RX ORDER — MILRINONE LACTATE 1 MG/ML
0.5 INJECTION, SOLUTION INTRAVENOUS
Qty: 1 | Refills: 0 | OUTPATIENT
Start: 2019-03-04

## 2019-03-04 RX ADMIN — SPIRONOLACTONE 25 MILLIGRAM(S): 25 TABLET, FILM COATED ORAL at 17:26

## 2019-03-04 RX ADMIN — CALCITRIOL 0.5 MICROGRAM(S): 0.5 CAPSULE ORAL at 13:05

## 2019-03-04 RX ADMIN — Medication 81 MILLIGRAM(S): at 13:05

## 2019-03-04 RX ADMIN — GABAPENTIN 200 MILLIGRAM(S): 400 CAPSULE ORAL at 06:10

## 2019-03-04 RX ADMIN — GABAPENTIN 200 MILLIGRAM(S): 400 CAPSULE ORAL at 17:26

## 2019-03-04 RX ADMIN — MAGNESIUM OXIDE 400 MG ORAL TABLET 400 MILLIGRAM(S): 241.3 TABLET ORAL at 17:27

## 2019-03-04 RX ADMIN — Medication 200 MILLIGRAM(S): at 06:16

## 2019-03-04 RX ADMIN — Medication 1 TABLET(S): at 06:10

## 2019-03-04 RX ADMIN — Medication 12.5 MILLIGRAM(S): at 06:09

## 2019-03-04 RX ADMIN — Medication 667 MILLIGRAM(S): at 13:05

## 2019-03-04 RX ADMIN — Medication 4: at 13:06

## 2019-03-04 RX ADMIN — CHLORHEXIDINE GLUCONATE 1 APPLICATION(S): 213 SOLUTION TOPICAL at 06:10

## 2019-03-04 RX ADMIN — Medication 20 MILLIGRAM(S): at 06:10

## 2019-03-04 RX ADMIN — MAGNESIUM OXIDE 400 MG ORAL TABLET 400 MILLIGRAM(S): 241.3 TABLET ORAL at 08:33

## 2019-03-04 RX ADMIN — BUMETANIDE 4 MILLIGRAM(S): 0.25 INJECTION INTRAMUSCULAR; INTRAVENOUS at 16:03

## 2019-03-04 RX ADMIN — Medication 667 MILLIGRAM(S): at 08:33

## 2019-03-04 RX ADMIN — APIXABAN 5 MILLIGRAM(S): 2.5 TABLET, FILM COATED ORAL at 06:19

## 2019-03-04 RX ADMIN — Medication 667 MILLIGRAM(S): at 17:26

## 2019-03-04 RX ADMIN — Medication 1 TABLET(S): at 17:26

## 2019-03-04 RX ADMIN — MAGNESIUM OXIDE 400 MG ORAL TABLET 400 MILLIGRAM(S): 241.3 TABLET ORAL at 13:05

## 2019-03-04 RX ADMIN — APIXABAN 5 MILLIGRAM(S): 2.5 TABLET, FILM COATED ORAL at 17:27

## 2019-03-04 RX ADMIN — Medication 175 MICROGRAM(S): at 06:10

## 2019-03-04 RX ADMIN — Medication 20 MILLIGRAM(S): at 13:05

## 2019-03-04 RX ADMIN — SPIRONOLACTONE 25 MILLIGRAM(S): 25 TABLET, FILM COATED ORAL at 06:09

## 2019-03-04 RX ADMIN — Medication 1 TABLET(S): at 13:05

## 2019-03-04 NOTE — PROGRESS NOTE ADULT - SUBJECTIVE AND OBJECTIVE BOX
pt seen and examined,  tele: paced     INR  2.43 this AM     apixaban 5 milliGRAM(s) Oral every 12 hours  aspirin enteric coated 81 milliGRAM(s) Oral daily  calcitriol   Capsule 0.5 MICROGram(s) Oral daily  calcium acetate 667 milliGRAM(s) Oral four times a day with meals  calcium carbonate 1250 mG  + Vitamin D (OsCal 500 + D) 1 Tablet(s) Oral four times a day  chlorhexidine 4% Liquid 1 Application(s) Topical <User Schedule>  dextrose 40% Gel 15 Gram(s) Oral once PRN  dextrose 5%. 1000 milliLiter(s) IV Continuous <Continuous>  dextrose 50% Injectable 12.5 Gram(s) IV Push once  dextrose 50% Injectable 25 Gram(s) IV Push once  dextrose 50% Injectable 25 Gram(s) IV Push once  gabapentin 200 milliGRAM(s) Oral two times a day  glucagon  Injectable 1 milliGRAM(s) IntraMuscular once PRN  guaiFENesin   Syrup  (Sugar-Free) 200 milliGRAM(s) Oral every 6 hours PRN  hydrALAZINE 20 milliGRAM(s) Oral every 8 hours  insulin glargine Injectable (LANTUS) 12 Unit(s) SubCutaneous at bedtime  insulin lispro (HumaLOG) corrective regimen sliding scale   SubCutaneous three times a day before meals  insulin lispro (HumaLOG) corrective regimen sliding scale   SubCutaneous at bedtime  levothyroxine 175 MICROGram(s) Oral daily  magnesium oxide 400 milliGRAM(s) Oral three times a day with meals  metoprolol succinate ER 12.5 milliGRAM(s) Oral daily  milrinone Infusion 0.5 MICROgram(s)/kG/Min IV Continuous <Continuous>  rifaximin 550 milliGRAM(s) Oral two times a day  spironolactone 25 milliGRAM(s) Oral two times a day                            10.6   8.2   )-----------( 185      ( 04 Mar 2019 05:07 )             32.4       Hemoglobin: 10.6 g/dL (03-04 @ 05:07)  Hemoglobin: 10.9 g/dL (03-03 @ 15:11)  Hemoglobin: 9.4 g/dL (03-03 @ 13:52)  Hemoglobin: 10.7 g/dL (03-03 @ 12:07)  Hemoglobin: 10.6 g/dL (03-03 @ 06:31)      03-04    125<L>  |  82<L>  |  20  ----------------------------<  131<H>  4.3   |  25  |  0.75    Ca    8.5      04 Mar 2019 05:07  Phos  3.5     03-04  Mg     2.0     03-04    TPro  7.3  /  Alb  2.9<L>  /  TBili  3.5<H>  /  DBili  x   /  AST  95<H>  /  ALT  42  /  AlkPhos  428<H>  03-04    Creatinine Trend: 0.75<--, 0.84<--, 0.86<--, 0.81<--, 0.88<--, 1.05<--    COAGS: PT/INR - ( 04 Mar 2019 05:07 )   PT: 28.5 sec;   INR: 2.43 ratio         PTT - ( 04 Mar 2019 05:07 )  PTT:32.3 sec          T(C): 36.6 (03-03-19 @ 12:00), Max: 36.6 (03-03-19 @ 12:00)  HR: 98 (03-04-19 @ 06:00) (90 - 107)  BP: 103/77 (03-04-19 @ 06:00) (93/69 - 122/81)  RR: 12 (03-04-19 @ 06:00) (12 - 22)  SpO2: 99% (03-04-19 @ 00:00) (95% - 100%)  Wt(kg): --    I&O's Summary    02 Mar 2019 07:01  -  03 Mar 2019 07:00  --------------------------------------------------------  IN: 1542.1 mL / OUT: 2900 mL / NET: -1357.9 mL    03 Mar 2019 07:01  -  04 Mar 2019 06:32  --------------------------------------------------------  IN: 1212.1 mL / OUT: 2400 mL / NET: -1187.9 mL      Gen: Appears well in NAD  HEENT:  (-)icterus (-)pallor  CV: N S1 S2 1/6 ROSALINDA (+)2 Pulses B/l  Resp:  Clear to ausculatation B/L, normal effort  GI: (+) BS Soft, NT, ND  Lymph:  (+)Edema, (-)obvious lymphadenopathy  Skin: Warm to touch, Normal turgor  Psych: Appropriate mood and affect        TELEMETRY: 	  V paced     ECG:  	Sinus Vpaced    RADIOLOGY:         CXR:  no infiltrate     ASSESSMENT/PLAN: 	63y Female  Severe  NICM MDT BIV ICD DM, Thyroid cancer CHF on cont. milrinone infusion via PICC, HTN, DM, who reports a few days of progressively worsening SOB at rest and w/ exertion acute on chronic decompensated systolic heart failure    - A/C with coumadin , INR 2-3   - ASA, statin   -  cont primacor, afterload reduction with hydralizine   - cont low dose BB ,  tolerating well   - Advanced CHF therapy per CHF team  D/W Dr Hallman

## 2019-03-04 NOTE — PROGRESS NOTE ADULT - SUBJECTIVE AND OBJECTIVE BOX
Patient is a 63y old  Female who presents with a chief complaint of Gained weight and sob (04 Mar 2019 09:37)      Overnight Event; no events       REVIEW OF SYSTEMS: denies cp/ shortness of breath   	    MEDICATIONS  (STANDING):  apixaban 5 milliGRAM(s) Oral every 12 hours  aspirin enteric coated 81 milliGRAM(s) Oral daily  calcitriol   Capsule 0.5 MICROGram(s) Oral daily  calcium acetate 667 milliGRAM(s) Oral four times a day with meals  calcium carbonate 1250 mG  + Vitamin D (OsCal 500 + D) 1 Tablet(s) Oral four times a day  chlorhexidine 4% Liquid 1 Application(s) Topical <User Schedule>  dextrose 5%. 1000 milliLiter(s) (50 mL/Hr) IV Continuous <Continuous>  dextrose 50% Injectable 12.5 Gram(s) IV Push once  dextrose 50% Injectable 25 Gram(s) IV Push once  dextrose 50% Injectable 25 Gram(s) IV Push once  gabapentin 200 milliGRAM(s) Oral two times a day  hydrALAZINE 20 milliGRAM(s) Oral every 8 hours  insulin glargine Injectable (LANTUS) 12 Unit(s) SubCutaneous at bedtime  insulin lispro (HumaLOG) corrective regimen sliding scale   SubCutaneous three times a day before meals  insulin lispro (HumaLOG) corrective regimen sliding scale   SubCutaneous at bedtime  levothyroxine 175 MICROGram(s) Oral daily  magnesium oxide 400 milliGRAM(s) Oral three times a day with meals  metoprolol succinate ER 12.5 milliGRAM(s) Oral daily  milrinone Infusion 0.5 MICROgram(s)/kG/Min (12.72 mL/Hr) IV Continuous <Continuous>  rifaximin 550 milliGRAM(s) Oral two times a day  spironolactone 25 milliGRAM(s) Oral two times a day    MEDICATIONS  (PRN):  dextrose 40% Gel 15 Gram(s) Oral once PRN Blood Glucose LESS THAN 70 milliGRAM(s)/deciliter  glucagon  Injectable 1 milliGRAM(s) IntraMuscular once PRN Glucose LESS THAN 70 milligrams/deciliter  guaiFENesin   Syrup  (Sugar-Free) 200 milliGRAM(s) Oral every 6 hours PRN Cough        PHYSICAL EXAM:  Telemetry: paced  Vital Signs Last 24 Hrs  T(C): 37.2 (04 Mar 2019 07:00), Max: 37.2 (04 Mar 2019 07:00)  T(F): 98.9 (04 Mar 2019 07:00), Max: 98.9 (04 Mar 2019 07:00)  HR: 99 (04 Mar 2019 10:00) (90 - 107)  BP: 114/85 (04 Mar 2019 10:00) (95/72 - 122/81)  BP(mean): 94 (04 Mar 2019 10:00) (80 - 101)  RR: 18 (04 Mar 2019 10:00) (12 - 22)  SpO2: 99% (04 Mar 2019 10:00) (95% - 100%)  I&O's Summary    03 Mar 2019 07:01  -  04 Mar 2019 07:00  --------------------------------------------------------  IN: 1224.8 mL / OUT: 2800 mL / NET: -1575.2 mL        Appearance: Normal	  HEENT:   Normal oral mucosa, PERRL, EOMI	  Lymphatic: No lymphadenopathy  Cardiovascular: Normal S1 S2, + JVD, No murmurs,+2 BLLE edema  Respiratory: Lungs clear to auscultation	  Psychiatry: A & O x 3, Mood & affect appropriate  Gastrointestinal:  Soft, Non-tender, + BS	  Skin: No rashes, No ecchymoses, No cyanosis	  Neurologic: Non-focal  Extremities: Normal range of motion, No clubbing, cyanosis. +2 BLLE edema RUE PICC  Vascular: Peripheral pulses palpable 2+ bilaterally    LABS:	 	                        10.6   8.2   )-----------( 185      ( 04 Mar 2019 05:07 )             32.4     Auto Eosinophil # x     / Auto Eosinophil % x     / Auto Neutrophil # x     / Auto Neutrophil % x     / BANDS % x                            10.9   7.4   )-----------( 232      ( 03 Mar 2019 15:11 )             33.3     Auto Eosinophil # x     / Auto Eosinophil % x     / Auto Neutrophil # x     / Auto Neutrophil % x     / BANDS % x                            9.4    7.2   )-----------( 181      ( 03 Mar 2019 13:52 )             28.8     Auto Eosinophil # x     / Auto Eosinophil % x     / Auto Neutrophil # x     / Auto Neutrophil % x     / BANDS % x        INR: 2.43 ratio (03-04 @ 05:07)  INR: 2.66 ratio (03-03 @ 13:52)  INR: 2.70 ratio (03-03 @ 12:07)    03-04    125<L>  |  82<L>  |  20  ----------------------------<  131<H>  4.3   |  25  |  0.75  03-03    120<LL>  |  76<L>  |  22  ----------------------------<  169<H>  4.1   |  28  |  0.84  03-03    123<L>  |  77<L>  |  22  ----------------------------<  165<H>  3.4<L>   |  32<H>  |  0.86    Ca    8.5      04 Mar 2019 05:07  Mg     2.0     03-04  Phos  3.5     03-04  TPro  7.3  /  Alb  2.9<L>  /  TBili  3.5<H>  /  DBili  x   /  AST  95<H>  /  ALT  42  /  AlkPhos  428<H>  03-04  TPro  7.6  /  Alb  3.2<L>  /  TBili  3.9<H>  /  DBili  x   /  AST  85<H>  /  ALT  37  /  AlkPhos  417<H>  03-03  TPro  7.9  /  Alb  3.5  /  TBili  4.2<H>  /  DBili  x   /  AST  77<H>  /  ALT  33  /  AlkPhos  407<H>  03-03        proBNP: Serum Pro-Brain Natriuretic Peptide: 2995 pg/mL (02-27 @ 18:53)    Lipid Profile:   HgA1c: 6.7 % (01-11 @ 08:16)        [facility] Ram[facility]rez[facility]  Contact #	[phone]

## 2019-03-04 NOTE — PROGRESS NOTE ADULT - SUBJECTIVE AND OBJECTIVE BOX
INTERVAL HPI/OVERNIGHT EVENTS: I feel much better so going home.   Vital Signs Last 24 Hrs  T(C): 37.2 (04 Mar 2019 07:00), Max: 37.2 (04 Mar 2019 07:00)  T(F): 98.9 (04 Mar 2019 07:00), Max: 98.9 (04 Mar 2019 07:00)  HR: 99 (04 Mar 2019 10:00) (90 - 107)  BP: 114/85 (04 Mar 2019 10:00) (95/72 - 122/81)  BP(mean): 94 (04 Mar 2019 10:00) (80 - 101)  RR: 18 (04 Mar 2019 10:00) (12 - 22)  SpO2: 99% (04 Mar 2019 10:00) (95% - 100%)  I&O's Summary    03 Mar 2019 07:01  -  04 Mar 2019 07:00  --------------------------------------------------------  IN: 1224.8 mL / OUT: 2800 mL / NET: -1575.2 mL      MEDICATIONS  (STANDING):  apixaban 5 milliGRAM(s) Oral every 12 hours  aspirin enteric coated 81 milliGRAM(s) Oral daily  calcitriol   Capsule 0.5 MICROGram(s) Oral daily  calcium acetate 667 milliGRAM(s) Oral four times a day with meals  calcium carbonate 1250 mG  + Vitamin D (OsCal 500 + D) 1 Tablet(s) Oral four times a day  chlorhexidine 4% Liquid 1 Application(s) Topical <User Schedule>  dextrose 5%. 1000 milliLiter(s) (50 mL/Hr) IV Continuous <Continuous>  dextrose 50% Injectable 12.5 Gram(s) IV Push once  dextrose 50% Injectable 25 Gram(s) IV Push once  dextrose 50% Injectable 25 Gram(s) IV Push once  gabapentin 200 milliGRAM(s) Oral two times a day  hydrALAZINE 20 milliGRAM(s) Oral every 8 hours  insulin glargine Injectable (LANTUS) 12 Unit(s) SubCutaneous at bedtime  insulin lispro (HumaLOG) corrective regimen sliding scale   SubCutaneous three times a day before meals  insulin lispro (HumaLOG) corrective regimen sliding scale   SubCutaneous at bedtime  levothyroxine 175 MICROGram(s) Oral daily  magnesium oxide 400 milliGRAM(s) Oral three times a day with meals  metoprolol succinate ER 12.5 milliGRAM(s) Oral daily  milrinone Infusion 0.5 MICROgram(s)/kG/Min (12.72 mL/Hr) IV Continuous <Continuous>  rifaximin 550 milliGRAM(s) Oral two times a day  spironolactone 25 milliGRAM(s) Oral two times a day    MEDICATIONS  (PRN):  dextrose 40% Gel 15 Gram(s) Oral once PRN Blood Glucose LESS THAN 70 milliGRAM(s)/deciliter  glucagon  Injectable 1 milliGRAM(s) IntraMuscular once PRN Glucose LESS THAN 70 milligrams/deciliter  guaiFENesin   Syrup  (Sugar-Free) 200 milliGRAM(s) Oral every 6 hours PRN Cough    LABS:                        10.6   8.2   )-----------( 185      ( 04 Mar 2019 05:07 )             32.4     03-04    125<L>  |  82<L>  |  20  ----------------------------<  131<H>  4.3   |  25  |  0.75    Ca    8.5      04 Mar 2019 05:07  Phos  3.5     03-04  Mg     2.0     03-04    TPro  7.3  /  Alb  2.9<L>  /  TBili  3.5<H>  /  DBili  x   /  AST  95<H>  /  ALT  42  /  AlkPhos  428<H>  03-04    PT/INR - ( 04 Mar 2019 05:07 )   PT: 28.5 sec;   INR: 2.43 ratio         PTT - ( 04 Mar 2019 05:07 )  PTT:32.3 sec    CAPILLARY BLOOD GLUCOSE      POCT Blood Glucose.: 113 mg/dL (04 Mar 2019 08:19)  POCT Blood Glucose.: 197 mg/dL (03 Mar 2019 21:34)  POCT Blood Glucose.: 157 mg/dL (03 Mar 2019 17:36)  POCT Blood Glucose.: 177 mg/dL (03 Mar 2019 14:35)          REVIEW OF SYSTEMS:  CONSTITUTIONAL: No fever, weight loss, or fatigue  EYES: No eye pain, visual disturbances, or discharge  ENMT:  No difficulty hearing, tinnitus, vertigo; No sinus or throat pain  NECK: No pain or stiffness  RESPIRATORY: No cough, wheezing, chills or hemoptysis; No shortness of breath  CARDIOVASCULAR: No chest pain, palpitations, dizziness, or leg swelling  GASTROINTESTINAL: No abdominal or epigastric pain. No nausea, vomiting, or hematemesis; No diarrhea or constipation. No melena or hematochezia.  GENITOURINARY: No dysuria, frequency, hematuria, or incontinence  NEUROLOGICAL: No headaches, memory loss, loss of strength, numbness, or tremors  SKIN: No itching, burning, rashes, or lesions     Consultant(s) Notes Reviewed:  [x ] YES  [ ] NO    PHYSICAL EXAM:  GENERAL: NAD, well-groomed, well-developed, not in any distress ,  HEAD:  Atraumatic, Normocephalic  EYES: EOMI, PERRLA, conjunctiva and sclera clear  ENMT: No tonsillar erythema, exudates, or enlargement; Moist mucous membranes, Good dentition, No lesions  NECK: Supple, No JVD, Normal thyroid  NERVOUS SYSTEM:  Alert & Oriented X3, No focal deficit   CHEST/LUNG: Good air entry bilateral with no  rales, rhonchi, wheezing, or rubs  HEART: Regular rate and rhythm; No murmurs, rubs, or gallops  ABDOMEN: Soft, Nontender, Nondistended; Bowel sounds present  EXTREMITIES:  2+ Peripheral Pulses, No clubbing, cyanosis, but less edema    Care Discussed with Consultants/Other Providers [ x] YES  [ ] NO

## 2019-03-04 NOTE — PROGRESS NOTE ADULT - ATTENDING COMMENTS
Patient is seen and examined with fellow, NP and the CCU house-staff. I agree with the history, physical and the assessment and plan.  euvolemic with stable Na levels  c/winotropic home support  plan to resume home dose diuretics

## 2019-03-04 NOTE — PROGRESS NOTE ADULT - ASSESSMENT
62 yo F h/o Severe  Cardiomyopathy ,DM, Thyroid cancer CHF on cont. milrinone infusion via PICC, HTN, DM, who reports a few days of progressively worsening SOB at rest and w/ exertion as compared to her usual baseline.  Associated w/ bilateral LE non painful edema over the last 2-3 days. Pt. w/ chronic non productive cough at baseline but this has also worsened over the last 2 days. She denies hemoptysis. States that she had a couple episodes of vomiting which occurred after intense coughing. She experienced one episode of emesis yesterday evening after dinner which was described as non bloody mucus. She denies abdominal pain or discomfort. Last BM was this morning normal appearing. Denies CP. Denies pleuritic pain. No reports of fevers, chill, flank or back pain, urinary complaints. Visiting nurse today aware of patient's worsening cough and SOB contacted patients cardiologist and patient was referred to the ED for admission.     Problem/Plan - 1:  ·  Problem: Acute on chronic systolic congestive heart failure.  Plan: Milrinone and Bumex infusion  and PO spironolactone. Heart Failure  team and cardiology helping.      Problem/Plan - 2:  ·  Problem:  Abnormal LFTs (liver function tests).  Plan: Likely sec to Liver congestion . LFT trending down.      Problem/Plan - 3:  ·  Problem: DM (diabetes mellitus).  Plan: Insulin for now. Sugars in good range.      Problem/Plan - 4:  ·  Problem: Hypothyroidism.  Plan: Synthroid home dose.      Problem/Plan - 5:  ·  Problem: Coagulopathy.  Plan: Vitamin K . Sec to liver congestion .      Problem/Plan - 6:  Problem: Hypocalcemia. Plan: Replacing.     Problem/Plan - 7:  ·  Problem: Hyponatremia.  Plan: Renal helping.      Problem/Plan - 8:  ·  Problem: Anemia.  Plan: Watching CBC.      Problem/Plan - 9:  ·  Problem: Fever .  Plan: Likely Viral Corona but has high ESR&CRP. Cultures negative . Cough suppressant .

## 2019-03-04 NOTE — PROGRESS NOTE ADULT - ASSESSMENT
ASSESSMENT/RECOMMEND  The patient is a 63-year-old female with the past medical history of nonischemic cardiomyopathy, diabetes, presumed parathyroid removal, thyroid cancer presents with coronavirus.  She has an evidence of hyponatremia, hypomagnesemia and hypocalcemia.  The patient also with acute kidney injury; suspect the coronavirus tripped her over into (at minimum)acute right ventricular dysfunction.  Chest x-ray at present is clear.       (1)Endocrine.  The patient likely had her parathyroid gland removed at the time of the thyroid removal.   On calcium pills orally four times a day She could not tolerate the magnesium oxide 400 mg three times a day.    (2)Hyponatremia; SP samsca and Urea Na yesterday;  Hyponatremia with heart failure has increased mortality   (3)Infectious Disease.  Supportive care.  The patient with positive RVP panel.  (4)Cardiology.  Milrinone dose per heart failure.  On aldactone and this medication should be increased        [facility]  [phone] ASSESSMENT/RECOMMEND  The patient is a 63-year-old female with the past medical history of nonischemic cardiomyopathy, diabetes, presumed parathyroid removal, thyroid cancer presents with coronavirus.  She has an evidence of hyponatremia, hypomagnesemia and hypocalcemia.  The patient also with acute kidney injury; suspect the coronavirus tripped her over into (at minimum)acute right ventricular dysfunction.  Chest x-ray at present is clear.       (1)Endocrine.  The patient likely had her parathyroid gland removed at the time of the thyroid removal.   On calcium pills orally four times a day    (2)Hyponatremia; SP samsca and Urea Na yesterday;  Hyponatremia with heart failure has increased mortality   (3)Infectious Disease.  Supportive care.  The patient with positive RVP panel.  (4)Cardiology.  Milrinone dose per heart failure.  On aldactone and this medication should be increased        [facility]  [phone] ASSESSMENT/RECOMMEND  The patient is a 63-year-old female with the past medical history of nonischemic cardiomyopathy, diabetes, presumed parathyroid removal, thyroid cancer presents with coronavirus.  She has an evidence of hyponatremia, hypomagnesemia and hypocalcemia.  The patient also with acute kidney injury; suspect the coronavirus tripped her over into (at minimum)acute right ventricular dysfunction.  Chest x-ray at present is clear.       (1)Endocrine.  The patient likely had her parathyroid gland removed at the time of the thyroid removal.   On calcium pills orally four times a day    (2)Hyponatremia; SP  Urea Na yesterday;  Hyponatremia with heart failure has increased mortality   (3)Infectious Disease.  Supportive care.  The patient with positive RVP panel.  (4)Cardiology.  Milrinone dose per heart failure.  On aldactone and this medication should be increased        [facility]  [phone]

## 2019-03-04 NOTE — PROGRESS NOTE ADULT - SUBJECTIVE AND OBJECTIVE BOX
EP ATTENDING    tele: sinus tach - BiV pacing    no palpitations, no syncope, + dyspnea, + LE edema    apixaban 5 milliGRAM(s) Oral every 12 hours  aspirin enteric coated 81 milliGRAM(s) Oral daily  calcitriol   Capsule 0.5 MICROGram(s) Oral daily  calcium acetate 667 milliGRAM(s) Oral four times a day with meals  calcium carbonate 1250 mG  + Vitamin D (OsCal 500 + D) 1 Tablet(s) Oral four times a day  chlorhexidine 4% Liquid 1 Application(s) Topical <User Schedule>  dextrose 40% Gel 15 Gram(s) Oral once PRN  dextrose 5%. 1000 milliLiter(s) IV Continuous <Continuous>  dextrose 50% Injectable 12.5 Gram(s) IV Push once  dextrose 50% Injectable 25 Gram(s) IV Push once  dextrose 50% Injectable 25 Gram(s) IV Push once  gabapentin 200 milliGRAM(s) Oral two times a day  glucagon  Injectable 1 milliGRAM(s) IntraMuscular once PRN  guaiFENesin   Syrup  (Sugar-Free) 200 milliGRAM(s) Oral every 6 hours PRN  hydrALAZINE 20 milliGRAM(s) Oral every 8 hours  insulin glargine Injectable (LANTUS) 12 Unit(s) SubCutaneous at bedtime  insulin lispro (HumaLOG) corrective regimen sliding scale   SubCutaneous three times a day before meals  insulin lispro (HumaLOG) corrective regimen sliding scale   SubCutaneous at bedtime  levothyroxine 175 MICROGram(s) Oral daily  magnesium oxide 400 milliGRAM(s) Oral three times a day with meals  metoprolol succinate ER 12.5 milliGRAM(s) Oral daily  milrinone Infusion 0.5 MICROgram(s)/kG/Min IV Continuous <Continuous>  rifaximin 550 milliGRAM(s) Oral two times a day  spironolactone 25 milliGRAM(s) Oral two times a day                            10.6   8.2   )-----------( 185      ( 04 Mar 2019 05:07 )             32.4       03-04    125<L>  |  82<L>  |  20  ----------------------------<  131<H>  4.3   |  25  |  0.75    Ca    8.5      04 Mar 2019 05:07  Phos  3.5     03-04  Mg     2.0     03-04    TPro  7.3  /  Alb  2.9<L>  /  TBili  3.5<H>  /  DBili  x   /  AST  95<H>  /  ALT  42  /  AlkPhos  428<H>  03-04      T(C): 37.2 (03-04-19 @ 07:00), Max: 37.2 (03-04-19 @ 07:00)  HR: 98 (03-04-19 @ 07:00) (90 - 107)  BP: 95/72 (03-04-19 @ 07:00) (95/72 - 122/81)  RR: 14 (03-04-19 @ 07:00) (12 - 22)  SpO2: 98% (03-04-19 @ 07:00) (95% - 100%)  Wt(kg): --    JVP > 20  RRR, no murmurs  CTAB  soft nt, mildly distented  no c/c, + 2 edema    device interrogation: NSVT, PAF, otherwise normal device function      A/P) She is a pleasant 62 y/o female PMH severe NICM (LVEF 10-15%) who had a Medtronic BiV-ICD implanted at [facility]. A LHC at [facility] was also unremarkable. She now returns for with a severe decompensation of her NICM (JVP > 12, LE edema and a near 30 lb weight gain). Her device interrogation recently demonstrates excellent RA, RV and LV lead functions, but with frequent episodes of NSVT. She denies palpitations nor high voltage therapy. EP is now called because she was thought "to have SVT" on tele. Review of all tele strips shows only sinus tachycardia, but keep in mind she does have a history of both PAF and NSVT. Review of her EKGs and tele show the rhythm is sinus with adequate BiV-tracking (RBBB in V1, negative in I/L). She hasn't had any more significant NSVT despite milrinone    -medical therapy as per CHF  -supportive care as per CCU  -would continue eliquis 5 bid for lifelong a/c given PAF with elevated chads-vasc  -a future option includes turning off BiV pacing to see if she responds better  -f/u with [facility] after discharge on Tue March 12th at 9:45am  -no further inpatient EP workup needed as long as she doesn't require HV therapy for VT

## 2019-03-04 NOTE — PROGRESS NOTE ADULT - SUBJECTIVE AND OBJECTIVE BOX
[facility] [phone]        Patient seen and examined in bed. She was in good spirits         MEDICATIONS  (STANDING):  apixaban 5 milliGRAM(s) Oral every 12 hours  aspirin enteric coated 81 milliGRAM(s) Oral daily  calcitriol   Capsule 0.5 MICROGram(s) Oral daily  calcium acetate 667 milliGRAM(s) Oral four times a day with meals  calcium carbonate 1250 mG  + Vitamin D (OsCal 500 + D) 1 Tablet(s) Oral four times a day  chlorhexidine 4% Liquid 1 Application(s) Topical <User Schedule>  dextrose 5%. 1000 milliLiter(s) (50 mL/Hr) IV Continuous <Continuous>  dextrose 50% Injectable 12.5 Gram(s) IV Push once  dextrose 50% Injectable 25 Gram(s) IV Push once  dextrose 50% Injectable 25 Gram(s) IV Push once  gabapentin 200 milliGRAM(s) Oral two times a day  hydrALAZINE 20 milliGRAM(s) Oral every 8 hours  insulin glargine Injectable (LANTUS) 12 Unit(s) SubCutaneous at bedtime  insulin lispro (HumaLOG) corrective regimen sliding scale   SubCutaneous three times a day before meals  insulin lispro (HumaLOG) corrective regimen sliding scale   SubCutaneous at bedtime  levothyroxine 175 MICROGram(s) Oral daily  magnesium oxide 400 milliGRAM(s) Oral three times a day with meals  metoprolol succinate ER 12.5 milliGRAM(s) Oral daily  milrinone Infusion 0.5 MICROgram(s)/kG/Min (12.72 mL/Hr) IV Continuous <Continuous>  rifaximin 550 milliGRAM(s) Oral two times a day  spironolactone 25 milliGRAM(s) Oral two times a day      VITAL:  T(C): , Max: 37.2 (03-04-19 @ 07:00)  T(F): , Max: 98.9 (03-04-19 @ 07:00)  HR: 102 (03-04-19 @ 09:00)  BP: 118/75 (03-04-19 @ 09:00)  BP(mean): 88 (03-04-19 @ 09:00)  RR: 16 (03-04-19 @ 09:00)  SpO2: 98% (03-04-19 @ 09:00)  Wt(kg): --    I and O's:    03-03 @ 07:01  -  03-04 @ 07:00  --------------------------------------------------------  IN: 1224.8 mL / OUT: 2800 mL / NET: -1575.2 mL          PHYSICAL EXAM:    Constitutional: NAD  Neck:  +  JVD  Respiratory: CTAB/L  Cardiovascular: S1 and S2  Gastrointestinal: BS+, soft, NT/ND  Extremities: +  peripheral edema  Neurological: A/O x 3, no focal deficits  Psychiatric: Normal mood, normal affect  : No Gustafson  Skin: No rashes  Access: Not applicable    LABS:                        10.6   8.2   )-----------( 185      ( 04 Mar 2019 05:07 )             32.4     03-04    125<L>  |  82<L>  |  20  ----------------------------<  131<H>  4.3   |  25  |  0.75    Ca    8.5      04 Mar 2019 05:07  Phos  3.5     03-04  Mg     2.0     03-04    TPro  7.3  /  Alb  2.9<L>  /  TBili  3.5<H>  /  DBili  x   /  AST  95<H>  /  ALT  42  /  AlkPhos  428<H>  03-04          Urine Studies:          RADIOLOGY & ADDITIONAL STUDIES: [facility] [phone]        Patient seen and examined in bed. She was in good spirits         MEDICATIONS  (STANDING):  apixaban 5 milliGRAM(s) Oral every 12 hours  aspirin enteric coated 81 milliGRAM(s) Oral daily  calcitriol   Capsule 0.5 MICROGram(s) Oral daily  calcium acetate 667 milliGRAM(s) Oral four times a day with meals  calcium carbonate 1250 mG  + Vitamin D (OsCal 500 + D) 1 Tablet(s) Oral four times a day  chlorhexidine 4% Liquid 1 Application(s) Topical <User Schedule>  dextrose 5%. 1000 milliLiter(s) (50 mL/Hr) IV Continuous <Continuous>  dextrose 50% Injectable 12.5 Gram(s) IV Push once  dextrose 50% Injectable 25 Gram(s) IV Push once  dextrose 50% Injectable 25 Gram(s) IV Push once  gabapentin 200 milliGRAM(s) Oral two times a day  hydrALAZINE 20 milliGRAM(s) Oral every 8 hours  insulin glargine Injectable (LANTUS) 12 Unit(s) SubCutaneous at bedtime  insulin lispro (HumaLOG) corrective regimen sliding scale   SubCutaneous three times a day before meals  insulin lispro (HumaLOG) corrective regimen sliding scale   SubCutaneous at bedtime  levothyroxine 175 MICROGram(s) Oral daily  magnesium oxide 400 milliGRAM(s) Oral three times a day with meals  metoprolol succinate ER 12.5 milliGRAM(s) Oral daily  milrinone Infusion 0.5 MICROgram(s)/kG/Min (12.72 mL/Hr) IV Continuous <Continuous>  rifaximin 550 milliGRAM(s) Oral two times a day  spironolactone 25 milliGRAM(s) Oral two times a day      VITAL:  T(C): , Max: 37.2 (03-04-19 @ 07:00)  T(F): , Max: 98.9 (03-04-19 @ 07:00)  HR: 102 (03-04-19 @ 09:00)  BP: 118/75 (03-04-19 @ 09:00)  BP(mean): 88 (03-04-19 @ 09:00)  RR: 16 (03-04-19 @ 09:00)  SpO2: 98% (03-04-19 @ 09:00)  Wt(kg): --    I and O's:    03-03 @ 07:01  -  03-04 @ 07:00  --------------------------------------------------------  IN: 1224.8 mL / OUT: 2800 mL / NET: -1575.2 mL          PHYSICAL EXAM:    Constitutional: NAD  Neck:  no  JVD  Respiratory: CTAB/L  Cardiovascular: S1 and S2  Gastrointestinal: BS+, soft, NT/ND  Extremities:   peripheral edema  Neurological: A/O x 3, no focal deficits  Psychiatric: Normal mood, normal affect  : No Gustafson  Skin: No rashes  Access: Not applicable    LABS:                        10.6   8.2   )-----------( 185      ( 04 Mar 2019 05:07 )             32.4     03-04    125<L>  |  82<L>  |  20  ----------------------------<  131<H>  4.3   |  25  |  0.75    Ca    8.5      04 Mar 2019 05:07  Phos  3.5     03-04  Mg     2.0     03-04    TPro  7.3  /  Alb  2.9<L>  /  TBili  3.5<H>  /  DBili  x   /  AST  95<H>  /  ALT  42  /  AlkPhos  428<H>  03-04          Urine Studies:          RADIOLOGY & ADDITIONAL STUDIES:

## 2019-03-04 NOTE — PROGRESS NOTE ADULT - ASSESSMENT
64 yo F h/o Severe  Cardiomyopathy ,DM, Thyroid cancer CHF on cont. milrinone infusion via PICC, HTN, DM p/w acute on chronic CHF exacerbation with hospital course c/b concern for sepsis. She had no improved, she is off bumex gtt for 2 days,  she is net negative fluid balance. She is to be discharged today and resume her home diuretics    NEURO:   ZANDRA    CV:  CHF exacerbation  - on milrinone at home, [facility]  - hold metolazone per HF recs  - hydralazine 20mg TID   - spironolactone increased to 50mg po bid as per renal   - c/w metoprolol  - daily weights  - fluid restrict  - f/u HF team recs  - f/u EP  s/p interrogation  medtronic biV pacemaker-no events noted     Pulm:  - diuresis as above,  resume home  bumex dose on 3/5/19  - RVP negative    :  - elevated Cr likely prerenal 2/2 intravascular depletion  - improving Cr, will continue to monitor Cr  - monitor UOP  -hyponatremia likely 2/2 hypervolemic hypovolemia had resolved, lab repeated will  need bmp recheck on Fri 3/8/19 arrranged for home blood draws at discharge       GI:  - DASH diet    ID:  - previously with fever, leukocytosis, mildly hypotensive, repeat RVP negative  - clinically improved  - positive for coronavirus  - BCx NGTD from 2/28  - 2/28 D/C  vanc/zosyn/ceftriaxone  no afebrile

## 2019-03-05 LAB
CULTURE RESULTS: SIGNIFICANT CHANGE UP
CULTURE RESULTS: SIGNIFICANT CHANGE UP
SPECIMEN SOURCE: SIGNIFICANT CHANGE UP
SPECIMEN SOURCE: SIGNIFICANT CHANGE UP

## 2019-03-06 ENCOUNTER — APPOINTMENT (OUTPATIENT)
Dept: CARDIOLOGY | Facility: CLINIC | Age: 64
End: 2019-03-06

## 2019-03-06 LAB — ANA TITR SER: NEGATIVE — SIGNIFICANT CHANGE UP

## 2019-03-09 NOTE — CONSULT NOTE ADULT - PROBLEM SELECTOR RECOMMENDATION 9
likely 2/2 NICM/CHF with low flow state with EF<25%  avoid hepatoxins  cont ursodiol   cont ppi/carafate  simethicone prn/zofran prn  bentyl tid  given recent EGD last month with a negative MRCP on last admission, unlikely hyperbilirubinemia a result of biliary obstruction  surgery eval/recs noted and appreciated with no plans for surgical intervention; pt high risk  low fat/dash diet negative

## 2019-03-13 ENCOUNTER — APPOINTMENT (OUTPATIENT)
Dept: CARDIOLOGY | Facility: CLINIC | Age: 64
End: 2019-03-13
Payer: COMMERCIAL

## 2019-03-13 VITALS
OXYGEN SATURATION: 100 % | DIASTOLIC BLOOD PRESSURE: 65 MMHG | SYSTOLIC BLOOD PRESSURE: 101 MMHG | WEIGHT: 174 LBS | BODY MASS INDEX: 27.31 KG/M2 | HEART RATE: 72 BPM | HEIGHT: 67 IN

## 2019-03-13 DIAGNOSIS — R74.8 ABNORMAL LEVELS OF OTHER SERUM ENZYMES: ICD-10-CM

## 2019-03-13 DIAGNOSIS — I48.91 UNSPECIFIED ATRIAL FIBRILLATION: ICD-10-CM

## 2019-03-13 DIAGNOSIS — E87.1 HYPO-OSMOLALITY AND HYPONATREMIA: ICD-10-CM

## 2019-03-13 DIAGNOSIS — I50.20 UNSPECIFIED SYSTOLIC (CONGESTIVE) HEART FAILURE: ICD-10-CM

## 2019-03-13 DIAGNOSIS — I42.0 DILATED CARDIOMYOPATHY: ICD-10-CM

## 2019-03-13 PROCEDURE — 99214 OFFICE O/P EST MOD 30 MIN: CPT

## 2019-03-13 RX ORDER — LOSARTAN POTASSIUM 25 MG/1
25 TABLET, FILM COATED ORAL DAILY
Qty: 15 | Refills: 1 | Status: DISCONTINUED | COMMUNITY
Start: 2019-02-19 | End: 2019-03-13

## 2019-03-13 RX ORDER — POTASSIUM CHLORIDE 1500 MG/1
20 TABLET, FILM COATED, EXTENDED RELEASE ORAL
Refills: 1 | Status: DISCONTINUED | COMMUNITY
Start: 2018-06-19 | End: 2019-03-13

## 2019-03-13 RX ORDER — METOLAZONE 2.5 MG/1
2.5 TABLET ORAL
Refills: 0 | Status: DISCONTINUED | COMMUNITY
Start: 2019-01-30 | End: 2019-03-13

## 2019-03-13 RX ORDER — GABAPENTIN 100 MG/1
100 CAPSULE ORAL TWICE DAILY
Qty: 120 | Refills: 0 | Status: ACTIVE | COMMUNITY
Start: 2018-06-14

## 2019-03-14 PROBLEM — I50.20 ACC/AHA STAGE D SYSTOLIC HEART FAILURE: Status: ACTIVE | Noted: 2018-12-05

## 2019-03-14 PROBLEM — I42.0 NONISCHEMIC DILATED CARDIOMYOPATHY: Status: ACTIVE | Noted: 2018-12-05

## 2019-03-14 PROBLEM — E87.1 HYPONATREMIA: Status: ACTIVE | Noted: 2018-12-13

## 2019-03-18 NOTE — DISCUSSION/SUMMARY
[Patient] : the patient [FreeTextEntry1] : 62 y/o AA woman with dilated NICM (LVEF 9%, LVIDd 6.1 cm), s/p CRT-D, Afib (on Eliquis), Mod-Severe MR, Severe TR, HTN, HLD, DM II, Thyroid Cancer s/p Resection, Surgical Hypoparathyroidism with Chronic Hypocalcemia, and multiple hospitalizations over the past year for ADHF, now inotrope dependent on home milrinone infusion via PICC line, who is doing well post discharge. She is ACC/AHA Stage D, NYHA class III, euvolemic, and normotensive. I have recommended the following:\par \par 1. ACC/AHA Stage D Heart Failure - Stable and well compensated. Will continue milrinone 0.5mcg/kg/min. She will continue Bumex 6mg in the AM and 4mg in the PM. Will increase hydralazine to 30 mg TID. Will continue current doses of Toprol XL 12.5mg daily and spironolactone 50mg BID at this time. Recent labs reviewed from 3/8: Renal function is stable. Will try and avoid metolazone given hyponatremia. She will continue to monitor her weight daily and instructed to notify us if she has a weight gain of 2 lbs in a day or 5 lbs in a week. Reinforced low sodium diet and limitiing fluid to < 2L per day. She will have weekly blood draws at home while she is on the milrinone infusion. She is not a candidate for advanced therapies given her poor insight and poor compliance.\par \par 3. RTC in 2 weeks with NP

## 2019-03-18 NOTE — PHYSICAL EXAM
[General Appearance - In No Acute Distress] : no acute distress [Eyelids - No Xanthelasma] : the eyelids demonstrated no xanthelasmas [No Oral Pallor] : no oral pallor [No Oral Cyanosis] : no oral cyanosis [] : no respiratory distress [Respiration, Rhythm And Depth] : normal respiratory rhythm and effort [Auscultation Breath Sounds / Voice Sounds] : lungs were clear to auscultation bilaterally [Heart Rate And Rhythm] : heart rate and rhythm were normal [Heart Sounds] : normal S1 and S2 [1+] : left 1+ [Bowel Sounds] : normal bowel sounds [Abdomen Soft] : soft [Abdomen Tenderness] : non-tender [Nail Clubbing] : no clubbing of the fingernails [Cyanosis, Localized] : no localized cyanosis [Skin Color & Pigmentation] : normal skin color and pigmentation [Oriented To Time, Place, And Person] : oriented to person, place, and time [Affect] : the affect was normal [Mood] : the mood was normal [General Appearance - Well Developed] : well developed [General Appearance - Well Nourished] : well nourished [FreeTextEntry1] : warm peripherally

## 2019-03-18 NOTE — REASON FOR VISIT
[Follow-Up - From Hospitalization] : follow-up of a recent hospitalization for [Heart Failure] : congestive heart failure [Discharge Date: ___] : Discharge Date: [unfilled] [Spouse] : spouse [FreeTextEntry1] : Patient Instructions:\par \par 1. Please INCREASE the HYDRALAZINE to 30 mg (three 10 mg tablets) THREE times a day.\par \par 2. CONTINUE the BUMEX (Bumetanide) 6 mg (three 2 mg tablets) in the morning and 4 mg (two 2 mg tablets) in the evening.\par \par 3. Continue all your other medications as prescribed.\par \par 4. Continue the MILRINONE infusion.\par \par 5. Continue to monitor your weight and BP at home. Call us if you notice a weight gain of 2 pounds in a days or 5 pounds in a week.\par \par 6. Please follow-up with the nurse practitioner in 2 weeks.

## 2019-03-18 NOTE — HISTORY OF PRESENT ILLNESS
[FreeTextEntry1] : Ms. Carson is a 62 y/o AA woman with NICM (LVEF now 9%, LVIDd 6.1 cm), initially identified in 2013, s/p CRT-D (6/2017), Afib (on Eliquis), Mod-Severe MR, Severe TR, HTN, HLD, DM II, Thyroid Cancer s/p Resection, Surgical Hypoparathyroidism with Chronic Hypocalcemia, and multiple hospitalizations over the past year for ADHF, now inotrope dependent on home milrinone infusion via PICC line, who presents today for follow-up post hospital discharge. \par \par Despite close outpatient follow up and escalation of diuretics she became progressively volume overloaded and symptomatic leading to admission 2/27-3/4 again for ADHF. Of note during that admission she admitted to medication noncompliance.  Her discharge weight was 173.5 lbs.\par \par Since discharge she reports feeling well. She was discharged on Bumex 4 mg BID, but states she was not "urinating enough" on that dose and had increased it to 4 mg AM and 6 mg PM. Since doing so, she reports better UO and her weight at home has remained stable since discharge ranging from 172-173 lbs. She does not get much activity outside of her home but states she is able to do all of her own shopping without dyspnea or fatigue. She can climb a flight of stairs without difficulty. She denies any CP, palpitations, syncope, LH/dizziness, SOB at rest, orthopnea, PND, cough, abdominal discomfort, LE edema, or weight gain. Her appetite is normal and her bowel and bladder habits are unchanged. She has been limiting fluid and sodium in his diet and taking her medications as directed. She has not had an ICD discharge since going home.

## 2019-03-19 ENCOUNTER — INPATIENT (INPATIENT)
Facility: HOSPITAL | Age: 64
LOS: 8 days | Discharge: LEFT BEFORE DISCHARGE | DRG: 871 | End: 2019-03-28
Attending: INTERNAL MEDICINE | Admitting: INTERNAL MEDICINE
Payer: COMMERCIAL

## 2019-03-19 VITALS
SYSTOLIC BLOOD PRESSURE: 113 MMHG | OXYGEN SATURATION: 97 % | TEMPERATURE: 98 F | DIASTOLIC BLOOD PRESSURE: 72 MMHG | WEIGHT: 154.32 LBS | RESPIRATION RATE: 18 BRPM | HEART RATE: 97 BPM

## 2019-03-19 DIAGNOSIS — Z95.810 PRESENCE OF AUTOMATIC (IMPLANTABLE) CARDIAC DEFIBRILLATOR: Chronic | ICD-10-CM

## 2019-03-19 LAB
ALBUMIN SERPL ELPH-MCNC: 3 G/DL — LOW (ref 3.3–5)
ALP SERPL-CCNC: 451 U/L — HIGH (ref 40–120)
ALT FLD-CCNC: 43 U/L — SIGNIFICANT CHANGE UP (ref 10–45)
ANION GAP SERPL CALC-SCNC: 16 MMOL/L — SIGNIFICANT CHANGE UP (ref 5–17)
ANISOCYTOSIS BLD QL: SIGNIFICANT CHANGE UP
APPEARANCE UR: CLEAR — SIGNIFICANT CHANGE UP
APTT BLD: 35.1 SEC — SIGNIFICANT CHANGE UP (ref 27.5–36.3)
AST SERPL-CCNC: 105 U/L — HIGH (ref 10–40)
BASOPHILS # BLD AUTO: 0 K/UL — SIGNIFICANT CHANGE UP (ref 0–0.2)
BASOPHILS NFR BLD AUTO: 1 % — SIGNIFICANT CHANGE UP (ref 0–2)
BILIRUB SERPL-MCNC: 4.7 MG/DL — HIGH (ref 0.2–1.2)
BILIRUB UR-MCNC: NEGATIVE — SIGNIFICANT CHANGE UP
BUN SERPL-MCNC: 25 MG/DL — HIGH (ref 7–23)
CALCIUM SERPL-MCNC: 8.4 MG/DL — SIGNIFICANT CHANGE UP (ref 8.4–10.5)
CHLORIDE SERPL-SCNC: 83 MMOL/L — LOW (ref 96–108)
CO2 SERPL-SCNC: 29 MMOL/L — SIGNIFICANT CHANGE UP (ref 22–31)
COLOR SPEC: SIGNIFICANT CHANGE UP
CREAT SERPL-MCNC: 1.21 MG/DL — SIGNIFICANT CHANGE UP (ref 0.5–1.3)
DIFF PNL FLD: NEGATIVE — SIGNIFICANT CHANGE UP
ELLIPTOCYTES BLD QL SMEAR: SLIGHT — SIGNIFICANT CHANGE UP
EOSINOPHIL # BLD AUTO: 0.1 K/UL — SIGNIFICANT CHANGE UP (ref 0–0.5)
EOSINOPHIL NFR BLD AUTO: 0 % — SIGNIFICANT CHANGE UP (ref 0–6)
GAS PNL BLDV: SIGNIFICANT CHANGE UP
GLUCOSE SERPL-MCNC: 176 MG/DL — HIGH (ref 70–99)
GLUCOSE UR QL: NEGATIVE — SIGNIFICANT CHANGE UP
HCT VFR BLD CALC: 34 % — LOW (ref 34.5–45)
HGB BLD-MCNC: 10.8 G/DL — LOW (ref 11.5–15.5)
HYPOCHROMIA BLD QL: SLIGHT — SIGNIFICANT CHANGE UP
INR BLD: 2.4 RATIO — HIGH (ref 0.88–1.16)
KETONES UR-MCNC: NEGATIVE — SIGNIFICANT CHANGE UP
LEUKOCYTE ESTERASE UR-ACNC: NEGATIVE — SIGNIFICANT CHANGE UP
LIDOCAIN IGE QN: 140 U/L — HIGH (ref 7–60)
LYMPHOCYTES # BLD AUTO: 1.9 K/UL — SIGNIFICANT CHANGE UP (ref 1–3.3)
LYMPHOCYTES # BLD AUTO: 7 % — LOW (ref 13–44)
MCHC RBC-ENTMCNC: 24 PG — LOW (ref 27–34)
MCHC RBC-ENTMCNC: 31.7 GM/DL — LOW (ref 32–36)
MCV RBC AUTO: 75.7 FL — LOW (ref 80–100)
MICROCYTES BLD QL: SLIGHT — SIGNIFICANT CHANGE UP
MONOCYTES # BLD AUTO: 1.5 K/UL — HIGH (ref 0–0.9)
MONOCYTES NFR BLD AUTO: 7 % — SIGNIFICANT CHANGE UP (ref 2–14)
NEUTROPHILS # BLD AUTO: 18.7 K/UL — HIGH (ref 1.8–7.4)
NEUTROPHILS NFR BLD AUTO: 83 % — HIGH (ref 43–77)
NITRITE UR-MCNC: NEGATIVE — SIGNIFICANT CHANGE UP
PH UR: 7.5 — SIGNIFICANT CHANGE UP (ref 5–8)
PLAT MORPH BLD: NORMAL — SIGNIFICANT CHANGE UP
PLATELET # BLD AUTO: 256 K/UL — SIGNIFICANT CHANGE UP (ref 150–400)
POIKILOCYTOSIS BLD QL AUTO: SLIGHT — SIGNIFICANT CHANGE UP
POLYCHROMASIA BLD QL SMEAR: SLIGHT — SIGNIFICANT CHANGE UP
POTASSIUM SERPL-MCNC: 5.7 MMOL/L — HIGH (ref 3.5–5.3)
POTASSIUM SERPL-SCNC: 5.7 MMOL/L — HIGH (ref 3.5–5.3)
PROT SERPL-MCNC: 8 G/DL — SIGNIFICANT CHANGE UP (ref 6–8.3)
PROT UR-MCNC: NEGATIVE — SIGNIFICANT CHANGE UP
PROTHROM AB SERPL-ACNC: 28.1 SEC — HIGH (ref 10–12.9)
RBC # BLD: 4.48 M/UL — SIGNIFICANT CHANGE UP (ref 3.8–5.2)
RBC # FLD: 23.5 % — HIGH (ref 10.3–14.5)
RBC BLD AUTO: ABNORMAL
SODIUM SERPL-SCNC: 128 MMOL/L — LOW (ref 135–145)
SP GR SPEC: 1.01 — LOW (ref 1.01–1.02)
TARGETS BLD QL SMEAR: SLIGHT — SIGNIFICANT CHANGE UP
UROBILINOGEN FLD QL: NEGATIVE — SIGNIFICANT CHANGE UP
VARIANT LYMPHS # BLD: 2 % — SIGNIFICANT CHANGE UP (ref 0–6)
WBC # BLD: 20.8 K/UL — HIGH (ref 3.8–10.5)
WBC # FLD AUTO: 20.8 K/UL — HIGH (ref 3.8–10.5)

## 2019-03-19 PROCEDURE — 71045 X-RAY EXAM CHEST 1 VIEW: CPT | Mod: 26

## 2019-03-19 PROCEDURE — 12001 RPR S/N/AX/GEN/TRNK 2.5CM/<: CPT

## 2019-03-19 PROCEDURE — 99291 CRITICAL CARE FIRST HOUR: CPT | Mod: 25

## 2019-03-19 PROCEDURE — 93010 ELECTROCARDIOGRAM REPORT: CPT | Mod: 59

## 2019-03-19 PROCEDURE — 72128 CT CHEST SPINE W/O DYE: CPT | Mod: 26

## 2019-03-19 PROCEDURE — 74177 CT ABD & PELVIS W/CONTRAST: CPT | Mod: 26

## 2019-03-19 PROCEDURE — 72125 CT NECK SPINE W/O DYE: CPT | Mod: 26

## 2019-03-19 PROCEDURE — 71260 CT THORAX DX C+: CPT | Mod: 26

## 2019-03-19 PROCEDURE — 70450 CT HEAD/BRAIN W/O DYE: CPT | Mod: 26

## 2019-03-19 RX ORDER — TETANUS TOXOID, REDUCED DIPHTHERIA TOXOID AND ACELLULAR PERTUSSIS VACCINE, ADSORBED 5; 2.5; 8; 8; 2.5 [IU]/.5ML; [IU]/.5ML; UG/.5ML; UG/.5ML; UG/.5ML
0.5 SUSPENSION INTRAMUSCULAR ONCE
Qty: 0 | Refills: 0 | Status: COMPLETED | OUTPATIENT
Start: 2019-03-19 | End: 2019-03-19

## 2019-03-19 RX ORDER — ACETAMINOPHEN 500 MG
1000 TABLET ORAL ONCE
Qty: 0 | Refills: 0 | Status: COMPLETED | OUTPATIENT
Start: 2019-03-19 | End: 2019-03-19

## 2019-03-19 RX ORDER — SODIUM CHLORIDE 9 MG/ML
1000 INJECTION INTRAMUSCULAR; INTRAVENOUS; SUBCUTANEOUS ONCE
Qty: 0 | Refills: 0 | Status: COMPLETED | OUTPATIENT
Start: 2019-03-19 | End: 2019-03-19

## 2019-03-19 RX ORDER — PROTHROMBIN COMPLEX CONCENTRATE (HUMAN) 25.5; 16.5; 24; 22; 22; 26 [IU]/ML; [IU]/ML; [IU]/ML; [IU]/ML; [IU]/ML; [IU]/ML
2000 POWDER, FOR SOLUTION INTRAVENOUS ONCE
Qty: 0 | Refills: 0 | Status: COMPLETED | OUTPATIENT
Start: 2019-03-19 | End: 2019-03-19

## 2019-03-19 RX ORDER — PROTHROMBIN COMPLEX CONCENTRATE (HUMAN) 25.5; 16.5; 24; 22; 22; 26 [IU]/ML; [IU]/ML; [IU]/ML; [IU]/ML; [IU]/ML; [IU]/ML
1500 POWDER, FOR SOLUTION INTRAVENOUS ONCE
Qty: 0 | Refills: 0 | Status: DISCONTINUED | OUTPATIENT
Start: 2019-03-19 | End: 2019-03-19

## 2019-03-19 RX ADMIN — Medication 400 MILLIGRAM(S): at 21:20

## 2019-03-19 RX ADMIN — SODIUM CHLORIDE 500 MILLILITER(S): 9 INJECTION INTRAMUSCULAR; INTRAVENOUS; SUBCUTANEOUS at 21:23

## 2019-03-19 RX ADMIN — TETANUS TOXOID, REDUCED DIPHTHERIA TOXOID AND ACELLULAR PERTUSSIS VACCINE, ADSORBED 0.5 MILLILITER(S): 5; 2.5; 8; 8; 2.5 SUSPENSION INTRAMUSCULAR at 21:22

## 2019-03-19 NOTE — ED PROVIDER NOTE - OBJECTIVE STATEMENT
64 yo F h/o Severe  Cardiomyopathy ,DM, Thyroid cancer CHF on cont. milrinone infusion via PICC, HTN, DM, coming in after a mechanical slip and fall down 14 steps.  + hit head, no LOC.  Was able to get up and walked to the couch with assistance form her nurse.  Here currently with pain on her left shin and pain in her head.  NO nausea, no vomiting, no visual changes.  NO neck or back pain.  No numbness no weakness.  Nothing makes her complaints better or worse.

## 2019-03-19 NOTE — ED PROVIDER NOTE - ATTENDING CONTRIBUTION TO CARE
FYI 63F, pmh cardiomypoathy, dm, thyroid ca, chf on milrinone infusion, htn, dm, presents s/p mechanical slip and fall down 14 steps. +head strike, no LOC. able to ambulate following. now complaining of L shin pain, headache, denies neck, back pain. denies sx preceding fall including but not limited to cp, sob, palpitations, dizziness. no n/v, no vision changes.    PE: C-collar in place, NAD, NCAT, MMM, Trachea midline, Normal conjunctiva, lungs CTAB, S1/S2 RRR, Normal perfusion, 2+ radial pulses bilat, Abdomen Soft, NTND, No rebound/guarding, No LE edema, No deformity of extremities, No rashes,  No focal motor or sensory deficits. No midline cervical ttp, +midline thoracic ttp. FROM bilat UE and LE wihtout bony or joint ssp. +abrasion over L shin. +2 cm laceration R parietal scalp. CN II-XII intact. Visual fields intact. EOMI, PERRLA. Facial sensation equal bilat. Smile and eye closure equal bilat. Hearing intact bilat. Palate elevation equal, tongue protrusion midline. Lateral head rotation equal bilat. 5/5 strength UE and LE bilat. Sensation grossly intact. No pronator drift. Normal finger to nose.     CW mechanical slip and fall. Due to mechanism, to obtain ct head, c-spine, chest, a/p to eval for acute traumatic injury. Obtain labs, urinalysis. Analgesia as needed. Re-eval. - Deep Oneill MD

## 2019-03-19 NOTE — ED PROVIDER NOTE - MUSCULOSKELETAL, MLM
No midline spinal tenderness, chest and pelvis non tender.  b/l UE and LE non tender thought-out full ROM. +midline spinal tenderness around T4, remainder of spine in non tender.  , chest and pelvis non tender.  b/l UE and LE non tender thought-out full ROM.

## 2019-03-19 NOTE — CONSULT NOTE ADULT - SUBJECTIVE AND OBJECTIVE BOX
p (1480)     HPI: Pearl mcgill, 64 yo F h/o Severe  Cardiomyopathy, DM, Thyroid cancer CHF on cont. milrinone infusion via PICC, EF of 10% per records, afib on asa81/elliquis, PPM, HTN, DM, coming in after a mechanical slip and fall down 14 steps.  + hit head, no LOC.  Was able to get up and walked to the couch with assistance form her nurse.  No HA/n/v.  CTH showed very thin r sdh.    PAST MEDICAL HISTORY   Asthma  CHF (congestive heart failure)  DM (diabetes mellitus)  HTN (hypertension)  Thyroid ca    PAST SURGICAL HISTORY   AICD (automatic cardioverter/defibrillator) present  S/P thyroidectomy    Isordil (Headache)  penicillin (Rash)      MEDICATIONS:  Antibiotics:    Neuro:    Anticoagulation:  prothrombin complex concentrate IVPB (KCENTRA) 2000 International Unit(s) IV Intermittent once    Other:      SOCIAL HISTORY:   Occupation:   Marital Status:     FAMILY HISTORY:  No pertinent family history in first degree relatives      REVIEW OF SYSTEMS:  Check here if all are normal other than Neurological/HPI [x]  General:  Eyes:  ENT:  Cardiac:  Respiratory:  GI:  Musculoskeletal:   Skin:  Neurologic:   Psychiatric:     PHYSICAL EXAMINATION:   T(C): 36.7 (19 @ 23:04), Max: 36.7 (19 @ 23:04)  HR: 97 (19 @ 23:04) (97 - 97)  BP: 113/72 (19 @ 23:04) (113/72 - 113/72)  RR: 18 (19 @ 23:04) (18 - 18)  SpO2: 97% (19 @ 23:04) (97% - 97%)  Wt(kg): --    Neurologic Examination:           AOx3, FC, PERRL, EOMI, no facial   5/5 throughout, no drift  SILT    LABS:                        10.8   20.8  )-----------( 256      ( 19 Mar 2019 20:16 )             34.0         128<L>  |  83<L>  |  25<H>  ----------------------------<  176<H>  5.7<H>   |  29  |  1.21    Ca    8.4      19 Mar 2019 20:16    TPro  8.0  /  Alb  3.0<L>  /  TBili  4.7<H>  /  DBili  x   /  AST  105<H>  /  ALT  43  /  AlkPhos  451<H>  03-    PT/INR - ( 19 Mar 2019 20:16 )   PT: 28.1 sec;   INR: 2.40 ratio         PTT - ( 19 Mar 2019 20:16 )  PTT:35.1 sec  Urinalysis Basic - ( 19 Mar 2019 20:28 )    Color: Light Yellow / Appearance: Clear / S.008 / pH: x  Gluc: x / Ketone: Negative  / Bili: Negative / Urobili: Negative   Blood: x / Protein: Negative / Nitrite: Negative   Leuk Esterase: Negative / RBC: x / WBC x   Sq Epi: x / Non Sq Epi: x / Bacteria: x

## 2019-03-19 NOTE — ED PROVIDER NOTE - PROGRESS NOTE DETAILS
Spoke with neurosurgery resident and reviewed CT, he stated that if she has not had recent stents to give Kcentra and repeat CT in 4 hours.  Spoke with pt, she does not have stents, was on Eliquis for clot prevention.  Rodriguez emma: pt hd stable, neuro intact, will adm under dr almeida who'll f/u cth results. Attending Lacey Henry: pt signed out admitted with h/o chf on milirone. came to evaluate pt ill appearing rigorous. concern for bacteremia. received abx. pt with fever, due for tylenol. additoinal IV access placed. pt awake but altered. repeat head ct completed. d/w micu as concern as pt ill appearing. unclear code status. tachycardic to 122. Attending Lacey Henry: pt ill appearing, micu consulted. pt now hypotensive. has 2 IV and PICC line will need levophed. ext wwp, likely septic shock over cardiogenic. dr elle romero

## 2019-03-20 DIAGNOSIS — S06.5X9A TRAUMATIC SUBDURAL HEMORRHAGE WITH LOSS OF CONSCIOUSNESS OF UNSPECIFIED DURATION, INITIAL ENCOUNTER: ICD-10-CM

## 2019-03-20 LAB
-  K. PNEUMONIAE GROUP: SIGNIFICANT CHANGE UP
ALBUMIN SERPL ELPH-MCNC: 2.7 G/DL — LOW (ref 3.3–5)
ALBUMIN SERPL ELPH-MCNC: 2.9 G/DL — LOW (ref 3.3–5)
ALBUMIN SERPL ELPH-MCNC: 3.3 G/DL — SIGNIFICANT CHANGE UP (ref 3.3–5)
ALP SERPL-CCNC: 442 U/L — HIGH (ref 40–120)
ALP SERPL-CCNC: 525 U/L — HIGH (ref 40–120)
ALP SERPL-CCNC: 645 U/L — HIGH (ref 40–120)
ALT FLD-CCNC: 36 U/L — SIGNIFICANT CHANGE UP (ref 10–45)
ALT FLD-CCNC: 39 U/L — SIGNIFICANT CHANGE UP (ref 10–45)
ALT FLD-CCNC: 39 U/L — SIGNIFICANT CHANGE UP (ref 10–45)
AMMONIA BLD-MCNC: 30 UMOL/L — SIGNIFICANT CHANGE UP (ref 11–55)
ANION GAP SERPL CALC-SCNC: 15 MMOL/L — SIGNIFICANT CHANGE UP (ref 5–17)
ANION GAP SERPL CALC-SCNC: 17 MMOL/L — SIGNIFICANT CHANGE UP (ref 5–17)
ANION GAP SERPL CALC-SCNC: 19 MMOL/L — HIGH (ref 5–17)
ANION GAP SERPL CALC-SCNC: 20 MMOL/L — HIGH (ref 5–17)
ANISOCYTOSIS BLD QL: SIGNIFICANT CHANGE UP
ANISOCYTOSIS BLD QL: SLIGHT — SIGNIFICANT CHANGE UP
APTT BLD: 25.9 SEC — LOW (ref 27.5–36.3)
APTT BLD: 28.5 SEC — SIGNIFICANT CHANGE UP (ref 27.5–36.3)
APTT BLD: 31.8 SEC — SIGNIFICANT CHANGE UP (ref 27.5–36.3)
APTT BLD: 33.5 SEC — SIGNIFICANT CHANGE UP (ref 27.5–36.3)
AST SERPL-CCNC: 52 U/L — HIGH (ref 10–40)
AST SERPL-CCNC: 55 U/L — HIGH (ref 10–40)
AST SERPL-CCNC: 58 U/L — HIGH (ref 10–40)
BASE EXCESS BLDV CALC-SCNC: 5.3 MMOL/L — HIGH (ref -2–2)
BASE EXCESS BLDV CALC-SCNC: 6.1 MMOL/L — HIGH (ref -2–2)
BASOPHILS # BLD AUTO: 0 K/UL — SIGNIFICANT CHANGE UP (ref 0–0.2)
BASOPHILS # BLD AUTO: 0.05 K/UL — SIGNIFICANT CHANGE UP (ref 0–0.2)
BASOPHILS # BLD AUTO: 0.1 K/UL — SIGNIFICANT CHANGE UP (ref 0–0.2)
BASOPHILS NFR BLD AUTO: 0 % — SIGNIFICANT CHANGE UP (ref 0–2)
BASOPHILS NFR BLD AUTO: 0.3 % — SIGNIFICANT CHANGE UP (ref 0–2)
BILIRUB SERPL-MCNC: 6.1 MG/DL — HIGH (ref 0.2–1.2)
BILIRUB SERPL-MCNC: 6.7 MG/DL — HIGH (ref 0.2–1.2)
BILIRUB SERPL-MCNC: 7 MG/DL — HIGH (ref 0.2–1.2)
BLD GP AB SCN SERPL QL: NEGATIVE — SIGNIFICANT CHANGE UP
BUN SERPL-MCNC: 23 MG/DL — SIGNIFICANT CHANGE UP (ref 7–23)
BUN SERPL-MCNC: 24 MG/DL — HIGH (ref 7–23)
BUN SERPL-MCNC: 27 MG/DL — HIGH (ref 7–23)
BUN SERPL-MCNC: 30 MG/DL — HIGH (ref 7–23)
CA-I SERPL-SCNC: 0.94 MMOL/L — LOW (ref 1.12–1.3)
CALCIUM SERPL-MCNC: 7.9 MG/DL — LOW (ref 8.4–10.5)
CALCIUM SERPL-MCNC: 8.4 MG/DL — SIGNIFICANT CHANGE UP (ref 8.4–10.5)
CHLORIDE BLDV-SCNC: 88 MMOL/L — LOW (ref 96–108)
CHLORIDE SERPL-SCNC: 84 MMOL/L — LOW (ref 96–108)
CHLORIDE SERPL-SCNC: 85 MMOL/L — LOW (ref 96–108)
CHLORIDE SERPL-SCNC: 85 MMOL/L — LOW (ref 96–108)
CHLORIDE SERPL-SCNC: 87 MMOL/L — LOW (ref 96–108)
CK MB CFR SERPL CALC: 2.4 NG/ML — SIGNIFICANT CHANGE UP (ref 0–3.8)
CK SERPL-CCNC: 90 U/L — SIGNIFICANT CHANGE UP (ref 25–170)
CO2 BLDV-SCNC: 31 MMOL/L — HIGH (ref 22–30)
CO2 BLDV-SCNC: 33 MMOL/L — HIGH (ref 22–30)
CO2 SERPL-SCNC: 24 MMOL/L — SIGNIFICANT CHANGE UP (ref 22–31)
CO2 SERPL-SCNC: 26 MMOL/L — SIGNIFICANT CHANGE UP (ref 22–31)
CO2 SERPL-SCNC: 27 MMOL/L — SIGNIFICANT CHANGE UP (ref 22–31)
CO2 SERPL-SCNC: 29 MMOL/L — SIGNIFICANT CHANGE UP (ref 22–31)
CREAT SERPL-MCNC: 1.07 MG/DL — SIGNIFICANT CHANGE UP (ref 0.5–1.3)
CREAT SERPL-MCNC: 1.07 MG/DL — SIGNIFICANT CHANGE UP (ref 0.5–1.3)
CREAT SERPL-MCNC: 1.27 MG/DL — SIGNIFICANT CHANGE UP (ref 0.5–1.3)
CREAT SERPL-MCNC: 1.58 MG/DL — HIGH (ref 0.5–1.3)
DACRYOCYTES BLD QL SMEAR: SLIGHT — SIGNIFICANT CHANGE UP
ELLIPTOCYTES BLD QL SMEAR: SLIGHT — SIGNIFICANT CHANGE UP
EOSINOPHIL # BLD AUTO: 0 K/UL — SIGNIFICANT CHANGE UP (ref 0–0.5)
EOSINOPHIL # BLD AUTO: 0 K/UL — SIGNIFICANT CHANGE UP (ref 0–0.5)
EOSINOPHIL # BLD AUTO: 0.01 K/UL — SIGNIFICANT CHANGE UP (ref 0–0.5)
EOSINOPHIL NFR BLD AUTO: 0 % — SIGNIFICANT CHANGE UP (ref 0–6)
EOSINOPHIL NFR BLD AUTO: 0.1 % — SIGNIFICANT CHANGE UP (ref 0–6)
GAS PNL BLDA: SIGNIFICANT CHANGE UP
GAS PNL BLDV: 125 MMOL/L — LOW (ref 136–145)
GAS PNL BLDV: SIGNIFICANT CHANGE UP
GAS PNL BLDV: SIGNIFICANT CHANGE UP
GLUCOSE BLDC GLUCOMTR-MCNC: 146 MG/DL — HIGH (ref 70–99)
GLUCOSE BLDC GLUCOMTR-MCNC: 162 MG/DL — HIGH (ref 70–99)
GLUCOSE BLDC GLUCOMTR-MCNC: 163 MG/DL — HIGH (ref 70–99)
GLUCOSE BLDC GLUCOMTR-MCNC: 208 MG/DL — HIGH (ref 70–99)
GLUCOSE BLDV-MCNC: 184 MG/DL — HIGH (ref 70–99)
GLUCOSE SERPL-MCNC: 123 MG/DL — HIGH (ref 70–99)
GLUCOSE SERPL-MCNC: 128 MG/DL — HIGH (ref 70–99)
GLUCOSE SERPL-MCNC: 158 MG/DL — HIGH (ref 70–99)
GLUCOSE SERPL-MCNC: 169 MG/DL — HIGH (ref 70–99)
GRAM STN FLD: SIGNIFICANT CHANGE UP
GRAM STN FLD: SIGNIFICANT CHANGE UP
HCO3 BLDV-SCNC: 30 MMOL/L — HIGH (ref 21–29)
HCO3 BLDV-SCNC: 31 MMOL/L — HIGH (ref 21–29)
HCT VFR BLD CALC: 30.9 % — LOW (ref 34.5–45)
HCT VFR BLD CALC: 32.4 % — LOW (ref 34.5–45)
HCT VFR BLD CALC: 34.1 % — LOW (ref 34.5–45)
HCT VFR BLD CALC: 34.4 % — LOW (ref 34.5–45)
HCT VFR BLDA CALC: 36 % — LOW (ref 39–50)
HGB BLD CALC-MCNC: 11.6 G/DL — SIGNIFICANT CHANGE UP (ref 11.5–15.5)
HGB BLD-MCNC: 10.3 G/DL — LOW (ref 11.5–15.5)
HGB BLD-MCNC: 10.7 G/DL — LOW (ref 11.5–15.5)
HGB BLD-MCNC: 10.9 G/DL — LOW (ref 11.5–15.5)
HGB BLD-MCNC: 9.9 G/DL — LOW (ref 11.5–15.5)
HOROWITZ INDEX BLDV+IHG-RTO: 21 — SIGNIFICANT CHANGE UP
HOROWITZ INDEX BLDV+IHG-RTO: SIGNIFICANT CHANGE UP
HYPOCHROMIA BLD QL: SLIGHT — SIGNIFICANT CHANGE UP
HYPOCHROMIA BLD QL: SLIGHT — SIGNIFICANT CHANGE UP
IMM GRANULOCYTES NFR BLD AUTO: 1.4 % — SIGNIFICANT CHANGE UP (ref 0–1.5)
INR BLD: 1.6 RATIO — HIGH (ref 0.88–1.16)
INR BLD: 1.72 RATIO — HIGH (ref 0.88–1.16)
INR BLD: 1.76 RATIO — HIGH (ref 0.88–1.16)
LACTATE BLDV-MCNC: 4 MMOL/L — CRITICAL HIGH (ref 0.7–2)
LACTATE SERPL-SCNC: 1.5 MMOL/L — SIGNIFICANT CHANGE UP (ref 0.7–2)
LACTATE SERPL-SCNC: 3.4 MMOL/L — HIGH (ref 0.7–2)
LG PLATELETS BLD QL AUTO: SLIGHT — SIGNIFICANT CHANGE UP
LYMPHOCYTES # BLD AUTO: 0.34 K/UL — LOW (ref 1–3.3)
LYMPHOCYTES # BLD AUTO: 0.6 K/UL — LOW (ref 1–3.3)
LYMPHOCYTES # BLD AUTO: 1.1 K/UL — SIGNIFICANT CHANGE UP (ref 1–3.3)
LYMPHOCYTES # BLD AUTO: 2 % — LOW (ref 13–44)
LYMPHOCYTES # BLD AUTO: 2.3 % — LOW (ref 13–44)
LYMPHOCYTES # BLD AUTO: 4 % — LOW (ref 13–44)
MACROCYTES BLD QL: SLIGHT — SIGNIFICANT CHANGE UP
MACROCYTES BLD QL: SLIGHT — SIGNIFICANT CHANGE UP
MAGNESIUM SERPL-MCNC: 1.2 MG/DL — LOW (ref 1.6–2.6)
MAGNESIUM SERPL-MCNC: 1.2 MG/DL — LOW (ref 1.6–2.6)
MAGNESIUM SERPL-MCNC: 2 MG/DL — SIGNIFICANT CHANGE UP (ref 1.6–2.6)
MANUAL SMEAR VERIFICATION: SIGNIFICANT CHANGE UP
MCHC RBC-ENTMCNC: 23.1 PG — LOW (ref 27–34)
MCHC RBC-ENTMCNC: 23.7 PG — LOW (ref 27–34)
MCHC RBC-ENTMCNC: 24.2 PG — LOW (ref 27–34)
MCHC RBC-ENTMCNC: 24.2 PG — LOW (ref 27–34)
MCHC RBC-ENTMCNC: 31 GM/DL — LOW (ref 32–36)
MCHC RBC-ENTMCNC: 31.7 GM/DL — LOW (ref 32–36)
MCHC RBC-ENTMCNC: 32 GM/DL — SIGNIFICANT CHANGE UP (ref 32–36)
MCHC RBC-ENTMCNC: 32 GM/DL — SIGNIFICANT CHANGE UP (ref 32–36)
MCV RBC AUTO: 72.2 FL — LOW (ref 80–100)
MCV RBC AUTO: 75.8 FL — LOW (ref 80–100)
MCV RBC AUTO: 76.1 FL — LOW (ref 80–100)
MCV RBC AUTO: 76.4 FL — LOW (ref 80–100)
METAMYELOCYTES # FLD: 1 % — HIGH (ref 0–0)
METAMYELOCYTES # FLD: 1 % — HIGH (ref 0–0)
METHOD TYPE: SIGNIFICANT CHANGE UP
MICROCYTES BLD QL: SIGNIFICANT CHANGE UP
MICROCYTES BLD QL: SLIGHT — SIGNIFICANT CHANGE UP
MONOCYTES # BLD AUTO: 0.05 K/UL — SIGNIFICANT CHANGE UP (ref 0–0.9)
MONOCYTES # BLD AUTO: 0.1 K/UL — SIGNIFICANT CHANGE UP (ref 0–0.9)
MONOCYTES # BLD AUTO: 1.6 K/UL — HIGH (ref 0–0.9)
MONOCYTES NFR BLD AUTO: 0.3 % — LOW (ref 2–14)
MONOCYTES NFR BLD AUTO: 5 % — SIGNIFICANT CHANGE UP (ref 2–14)
MYELOCYTES NFR BLD: 1 % — HIGH (ref 0–0)
MYELOCYTES NFR BLD: 1 % — HIGH (ref 0–0)
NEUTROPHILS # BLD AUTO: 13.98 K/UL — HIGH (ref 1.8–7.4)
NEUTROPHILS # BLD AUTO: 26.1 K/UL — HIGH (ref 1.8–7.4)
NEUTROPHILS # BLD AUTO: 58.4 K/UL — HIGH (ref 1.8–7.4)
NEUTROPHILS NFR BLD AUTO: 75 % — SIGNIFICANT CHANGE UP (ref 43–77)
NEUTROPHILS NFR BLD AUTO: 80 % — HIGH (ref 43–77)
NEUTROPHILS NFR BLD AUTO: 95.6 % — HIGH (ref 43–77)
NEUTS BAND # BLD: 21 % — HIGH (ref 0–8)
NEUTS BAND # BLD: 9 % — HIGH (ref 0–8)
OVALOCYTES BLD QL SMEAR: SLIGHT — SIGNIFICANT CHANGE UP
OVALOCYTES BLD QL SMEAR: SLIGHT — SIGNIFICANT CHANGE UP
PCO2 BLDV: 45 MMHG — SIGNIFICANT CHANGE UP (ref 35–50)
PCO2 BLDV: 50 MMHG — SIGNIFICANT CHANGE UP (ref 35–50)
PH BLDV: 7.41 — SIGNIFICANT CHANGE UP (ref 7.35–7.45)
PH BLDV: 7.43 — SIGNIFICANT CHANGE UP (ref 7.35–7.45)
PHOSPHATE SERPL-MCNC: 3.5 MG/DL — SIGNIFICANT CHANGE UP (ref 2.5–4.5)
PLAT MORPH BLD: NORMAL — SIGNIFICANT CHANGE UP
PLATELET # BLD AUTO: 194 K/UL — SIGNIFICANT CHANGE UP (ref 150–400)
PLATELET # BLD AUTO: 202 K/UL — SIGNIFICANT CHANGE UP (ref 150–400)
PLATELET # BLD AUTO: 221 K/UL — SIGNIFICANT CHANGE UP (ref 150–400)
PLATELET # BLD AUTO: 226 K/UL — SIGNIFICANT CHANGE UP (ref 150–400)
PO2 BLDV: 22 MMHG — LOW (ref 25–45)
PO2 BLDV: 24 MMHG — LOW (ref 25–45)
POIKILOCYTOSIS BLD QL AUTO: SLIGHT — SIGNIFICANT CHANGE UP
POIKILOCYTOSIS BLD QL AUTO: SLIGHT — SIGNIFICANT CHANGE UP
POLYCHROMASIA BLD QL SMEAR: SLIGHT — SIGNIFICANT CHANGE UP
POLYCHROMASIA BLD QL SMEAR: SLIGHT — SIGNIFICANT CHANGE UP
POTASSIUM BLDV-SCNC: 3.2 MMOL/L — LOW (ref 3.5–5.3)
POTASSIUM SERPL-MCNC: 3.1 MMOL/L — LOW (ref 3.5–5.3)
POTASSIUM SERPL-MCNC: 3.1 MMOL/L — LOW (ref 3.5–5.3)
POTASSIUM SERPL-MCNC: 3.7 MMOL/L — SIGNIFICANT CHANGE UP (ref 3.5–5.3)
POTASSIUM SERPL-MCNC: 4 MMOL/L — SIGNIFICANT CHANGE UP (ref 3.5–5.3)
POTASSIUM SERPL-SCNC: 3.1 MMOL/L — LOW (ref 3.5–5.3)
POTASSIUM SERPL-SCNC: 3.1 MMOL/L — LOW (ref 3.5–5.3)
POTASSIUM SERPL-SCNC: 3.7 MMOL/L — SIGNIFICANT CHANGE UP (ref 3.5–5.3)
POTASSIUM SERPL-SCNC: 4 MMOL/L — SIGNIFICANT CHANGE UP (ref 3.5–5.3)
PROCALCITONIN SERPL-MCNC: 43.65 NG/ML — HIGH (ref 0.02–0.1)
PROT SERPL-MCNC: 7.1 G/DL — SIGNIFICANT CHANGE UP (ref 6–8.3)
PROT SERPL-MCNC: 7.3 G/DL — SIGNIFICANT CHANGE UP (ref 6–8.3)
PROT SERPL-MCNC: 7.7 G/DL — SIGNIFICANT CHANGE UP (ref 6–8.3)
PROTHROM AB SERPL-ACNC: 18.5 SEC — HIGH (ref 10–12.9)
PROTHROM AB SERPL-ACNC: 19.9 SEC — HIGH (ref 10–12.9)
PROTHROM AB SERPL-ACNC: 20.4 SEC — HIGH (ref 10–12.9)
RAPID RVP RESULT: SIGNIFICANT CHANGE UP
RBC # BLD: 4.08 M/UL — SIGNIFICANT CHANGE UP (ref 3.8–5.2)
RBC # BLD: 4.26 M/UL — SIGNIFICANT CHANGE UP (ref 3.8–5.2)
RBC # BLD: 4.5 M/UL — SIGNIFICANT CHANGE UP (ref 3.8–5.2)
RBC # BLD: 4.72 M/UL — SIGNIFICANT CHANGE UP (ref 3.8–5.2)
RBC # FLD: 23.3 % — HIGH (ref 10.3–14.5)
RBC # FLD: 23.4 % — HIGH (ref 10.3–14.5)
RBC # FLD: 23.5 % — HIGH (ref 10.3–14.5)
RBC # FLD: 23.9 % — HIGH (ref 10.3–14.5)
RBC BLD AUTO: ABNORMAL
RBC BLD AUTO: ABNORMAL
RBC BLD AUTO: SIGNIFICANT CHANGE UP
RH IG SCN BLD-IMP: POSITIVE — SIGNIFICANT CHANGE UP
SAO2 % BLDV: 33 % — LOW (ref 67–88)
SAO2 % BLDV: 34 % — LOW (ref 67–88)
SCHISTOCYTES BLD QL AUTO: SLIGHT — SIGNIFICANT CHANGE UP
SODIUM SERPL-SCNC: 128 MMOL/L — LOW (ref 135–145)
SODIUM SERPL-SCNC: 129 MMOL/L — LOW (ref 135–145)
SODIUM SERPL-SCNC: 130 MMOL/L — LOW (ref 135–145)
SODIUM SERPL-SCNC: 131 MMOL/L — LOW (ref 135–145)
SPECIMEN SOURCE: SIGNIFICANT CHANGE UP
SPECIMEN SOURCE: SIGNIFICANT CHANGE UP
T3 SERPL-MCNC: 44 NG/DL — LOW (ref 80–200)
T4 AB SER-ACNC: 4.5 UG/DL — LOW (ref 4.6–12)
TARGETS BLD QL SMEAR: SLIGHT — SIGNIFICANT CHANGE UP
TROPONIN T, HIGH SENSITIVITY RESULT: 37 NG/L — SIGNIFICANT CHANGE UP (ref 0–51)
TROPONIN T, HIGH SENSITIVITY RESULT: 55 NG/L — HIGH (ref 0–51)
TSH SERPL-MCNC: 6.18 UIU/ML — HIGH (ref 0.27–4.2)
WBC # BLD: 14.64 K/UL — HIGH (ref 3.8–10.5)
WBC # BLD: 27 K/UL — HIGH (ref 3.8–10.5)
WBC # BLD: 56.2 K/UL — CRITICAL HIGH (ref 3.8–10.5)
WBC # BLD: 61 K/UL — CRITICAL HIGH (ref 3.8–10.5)
WBC # FLD AUTO: 14.64 K/UL — HIGH (ref 3.8–10.5)
WBC # FLD AUTO: 27 K/UL — HIGH (ref 3.8–10.5)
WBC # FLD AUTO: 56.2 K/UL — CRITICAL HIGH (ref 3.8–10.5)
WBC # FLD AUTO: 61 K/UL — CRITICAL HIGH (ref 3.8–10.5)

## 2019-03-20 PROCEDURE — 93308 TTE F-UP OR LMTD: CPT | Mod: 26

## 2019-03-20 PROCEDURE — 76937 US GUIDE VASCULAR ACCESS: CPT | Mod: 26

## 2019-03-20 PROCEDURE — 99254 IP/OBS CNSLTJ NEW/EST MOD 60: CPT | Mod: GC

## 2019-03-20 PROCEDURE — 71045 X-RAY EXAM CHEST 1 VIEW: CPT | Mod: 26

## 2019-03-20 PROCEDURE — 99291 CRITICAL CARE FIRST HOUR: CPT

## 2019-03-20 PROCEDURE — 70450 CT HEAD/BRAIN W/O DYE: CPT | Mod: 26

## 2019-03-20 RX ORDER — NOREPINEPHRINE BITARTRATE/D5W 8 MG/250ML
1 PLASTIC BAG, INJECTION (ML) INTRAVENOUS
Qty: 8 | Refills: 0 | Status: DISCONTINUED | OUTPATIENT
Start: 2019-03-20 | End: 2019-03-20

## 2019-03-20 RX ORDER — AZTREONAM 2 G
1000 VIAL (EA) INJECTION ONCE
Qty: 0 | Refills: 0 | Status: COMPLETED | OUTPATIENT
Start: 2019-03-20 | End: 2019-03-20

## 2019-03-20 RX ORDER — AZTREONAM 2 G
1000 VIAL (EA) INJECTION EVERY 8 HOURS
Qty: 0 | Refills: 0 | Status: DISCONTINUED | OUTPATIENT
Start: 2019-03-20 | End: 2019-03-20

## 2019-03-20 RX ORDER — PHYTONADIONE (VIT K1) 5 MG
10 TABLET ORAL ONCE
Qty: 0 | Refills: 0 | Status: COMPLETED | OUTPATIENT
Start: 2019-03-20 | End: 2019-03-20

## 2019-03-20 RX ORDER — VANCOMYCIN HCL 1 G
1000 VIAL (EA) INTRAVENOUS EVERY 12 HOURS
Qty: 0 | Refills: 0 | Status: DISCONTINUED | OUTPATIENT
Start: 2019-03-20 | End: 2019-03-21

## 2019-03-20 RX ORDER — DESMOPRESSIN ACETATE 0.1 MG/1
20 TABLET ORAL ONCE
Qty: 0 | Refills: 0 | Status: COMPLETED | OUTPATIENT
Start: 2019-03-20 | End: 2019-03-20

## 2019-03-20 RX ORDER — DESMOPRESSIN ACETATE 0.1 MG/1
20 TABLET ORAL ONCE
Qty: 0 | Refills: 0 | Status: DISCONTINUED | OUTPATIENT
Start: 2019-03-20 | End: 2019-03-20

## 2019-03-20 RX ORDER — METRONIDAZOLE 500 MG
500 TABLET ORAL ONCE
Qty: 0 | Refills: 0 | Status: DISCONTINUED | OUTPATIENT
Start: 2019-03-20 | End: 2019-03-20

## 2019-03-20 RX ORDER — FENTANYL CITRATE 50 UG/ML
25 INJECTION INTRAVENOUS ONCE
Qty: 0 | Refills: 0 | Status: DISCONTINUED | OUTPATIENT
Start: 2019-03-20 | End: 2019-03-20

## 2019-03-20 RX ORDER — POTASSIUM CHLORIDE 20 MEQ
40 PACKET (EA) ORAL ONCE
Qty: 0 | Refills: 0 | Status: COMPLETED | OUTPATIENT
Start: 2019-03-20 | End: 2019-03-20

## 2019-03-20 RX ORDER — ACETAMINOPHEN 500 MG
1000 TABLET ORAL ONCE
Qty: 0 | Refills: 0 | Status: COMPLETED | OUTPATIENT
Start: 2019-03-20 | End: 2019-03-20

## 2019-03-20 RX ORDER — BUMETANIDE 0.25 MG/ML
2 INJECTION INTRAMUSCULAR; INTRAVENOUS
Qty: 120 | Refills: 0 | OUTPATIENT

## 2019-03-20 RX ORDER — NOREPINEPHRINE BITARTRATE/D5W 8 MG/250ML
0.05 PLASTIC BAG, INJECTION (ML) INTRAVENOUS
Qty: 16 | Refills: 0 | Status: DISCONTINUED | OUTPATIENT
Start: 2019-03-20 | End: 2019-03-23

## 2019-03-20 RX ORDER — MILRINONE LACTATE 1 MG/ML
0.12 INJECTION, SOLUTION INTRAVENOUS
Qty: 20 | Refills: 0 | Status: DISCONTINUED | OUTPATIENT
Start: 2019-03-20 | End: 2019-03-20

## 2019-03-20 RX ORDER — MAGNESIUM SULFATE 500 MG/ML
2 VIAL (ML) INJECTION ONCE
Qty: 0 | Refills: 0 | Status: COMPLETED | OUTPATIENT
Start: 2019-03-20 | End: 2019-03-20

## 2019-03-20 RX ORDER — DEXTROSE 50 % IN WATER 50 %
12.5 SYRINGE (ML) INTRAVENOUS ONCE
Qty: 0 | Refills: 0 | Status: DISCONTINUED | OUTPATIENT
Start: 2019-03-20 | End: 2019-03-21

## 2019-03-20 RX ORDER — VANCOMYCIN HCL 1 G
1000 VIAL (EA) INTRAVENOUS ONCE
Qty: 0 | Refills: 0 | Status: COMPLETED | OUTPATIENT
Start: 2019-03-20 | End: 2019-03-20

## 2019-03-20 RX ORDER — AZITHROMYCIN 500 MG/1
500 TABLET, FILM COATED ORAL EVERY 24 HOURS
Qty: 0 | Refills: 0 | Status: DISCONTINUED | OUTPATIENT
Start: 2019-03-20 | End: 2019-03-20

## 2019-03-20 RX ORDER — DEXTROSE 50 % IN WATER 50 %
25 SYRINGE (ML) INTRAVENOUS ONCE
Qty: 0 | Refills: 0 | Status: DISCONTINUED | OUTPATIENT
Start: 2019-03-20 | End: 2019-03-21

## 2019-03-20 RX ORDER — GLUCAGON INJECTION, SOLUTION 0.5 MG/.1ML
1 INJECTION, SOLUTION SUBCUTANEOUS ONCE
Qty: 0 | Refills: 0 | Status: DISCONTINUED | OUTPATIENT
Start: 2019-03-20 | End: 2019-03-21

## 2019-03-20 RX ORDER — MEROPENEM 1 G/30ML
1000 INJECTION INTRAVENOUS EVERY 12 HOURS
Qty: 0 | Refills: 0 | Status: DISCONTINUED | OUTPATIENT
Start: 2019-03-20 | End: 2019-03-20

## 2019-03-20 RX ORDER — BUMETANIDE 0.25 MG/ML
2 INJECTION INTRAMUSCULAR; INTRAVENOUS ONCE
Qty: 0 | Refills: 0 | Status: COMPLETED | OUTPATIENT
Start: 2019-03-20 | End: 2019-03-20

## 2019-03-20 RX ORDER — SODIUM CHLORIDE 9 MG/ML
1000 INJECTION, SOLUTION INTRAVENOUS
Qty: 0 | Refills: 0 | Status: DISCONTINUED | OUTPATIENT
Start: 2019-03-20 | End: 2019-03-21

## 2019-03-20 RX ORDER — MILRINONE LACTATE 1 MG/ML
0.5 INJECTION, SOLUTION INTRAVENOUS
Qty: 20 | Refills: 0 | Status: DISCONTINUED | OUTPATIENT
Start: 2019-03-20 | End: 2019-03-26

## 2019-03-20 RX ORDER — NOREPINEPHRINE BITARTRATE/D5W 8 MG/250ML
0.05 PLASTIC BAG, INJECTION (ML) INTRAVENOUS
Qty: 8 | Refills: 0 | Status: DISCONTINUED | OUTPATIENT
Start: 2019-03-20 | End: 2019-03-20

## 2019-03-20 RX ORDER — INSULIN LISPRO 100/ML
VIAL (ML) SUBCUTANEOUS EVERY 6 HOURS
Qty: 0 | Refills: 0 | Status: DISCONTINUED | OUTPATIENT
Start: 2019-03-20 | End: 2019-03-21

## 2019-03-20 RX ORDER — PROTHROMBIN COMPLEX CONCENTRATE (HUMAN) 25.5; 16.5; 24; 22; 22; 26 [IU]/ML; [IU]/ML; [IU]/ML; [IU]/ML; [IU]/ML; [IU]/ML
1000 POWDER, FOR SOLUTION INTRAVENOUS ONCE
Qty: 0 | Refills: 0 | Status: COMPLETED | OUTPATIENT
Start: 2019-03-20 | End: 2019-03-20

## 2019-03-20 RX ORDER — METRONIDAZOLE 500 MG
TABLET ORAL
Qty: 0 | Refills: 0 | Status: DISCONTINUED | OUTPATIENT
Start: 2019-03-20 | End: 2019-03-20

## 2019-03-20 RX ORDER — MEROPENEM 1 G/30ML
1000 INJECTION INTRAVENOUS EVERY 24 HOURS
Qty: 0 | Refills: 0 | Status: DISCONTINUED | OUTPATIENT
Start: 2019-03-20 | End: 2019-03-21

## 2019-03-20 RX ORDER — AZITHROMYCIN 500 MG/1
500 TABLET, FILM COATED ORAL ONCE
Qty: 0 | Refills: 0 | Status: COMPLETED | OUTPATIENT
Start: 2019-03-20 | End: 2019-03-20

## 2019-03-20 RX ORDER — NOREPINEPHRINE BITARTRATE/D5W 8 MG/250ML
0.25 PLASTIC BAG, INJECTION (ML) INTRAVENOUS
Qty: 8 | Refills: 0 | Status: DISCONTINUED | OUTPATIENT
Start: 2019-03-20 | End: 2019-03-20

## 2019-03-20 RX ORDER — AMIODARONE HYDROCHLORIDE 400 MG/1
150 TABLET ORAL ONCE
Qty: 0 | Refills: 0 | Status: COMPLETED | OUTPATIENT
Start: 2019-03-20 | End: 2019-03-20

## 2019-03-20 RX ORDER — METRONIDAZOLE 500 MG
500 TABLET ORAL EVERY 8 HOURS
Qty: 0 | Refills: 0 | Status: DISCONTINUED | OUTPATIENT
Start: 2019-03-20 | End: 2019-03-20

## 2019-03-20 RX ORDER — CHLORHEXIDINE GLUCONATE 213 G/1000ML
1 SOLUTION TOPICAL
Qty: 0 | Refills: 0 | Status: DISCONTINUED | OUTPATIENT
Start: 2019-03-20 | End: 2019-03-28

## 2019-03-20 RX ORDER — SODIUM CHLORIDE 9 MG/ML
10 INJECTION INTRAMUSCULAR; INTRAVENOUS; SUBCUTANEOUS
Qty: 0 | Refills: 0 | Status: DISCONTINUED | OUTPATIENT
Start: 2019-03-20 | End: 2019-03-20

## 2019-03-20 RX ORDER — DEXTROSE 50 % IN WATER 50 %
15 SYRINGE (ML) INTRAVENOUS ONCE
Qty: 0 | Refills: 0 | Status: DISCONTINUED | OUTPATIENT
Start: 2019-03-20 | End: 2019-03-21

## 2019-03-20 RX ORDER — POTASSIUM CHLORIDE 20 MEQ
10 PACKET (EA) ORAL
Qty: 0 | Refills: 0 | Status: COMPLETED | OUTPATIENT
Start: 2019-03-20 | End: 2019-03-20

## 2019-03-20 RX ADMIN — Medication 2: at 18:16

## 2019-03-20 RX ADMIN — DESMOPRESSIN ACETATE 220 MICROGRAM(S): 0.1 TABLET ORAL at 00:59

## 2019-03-20 RX ADMIN — BUMETANIDE 2 MILLIGRAM(S): 0.25 INJECTION INTRAMUSCULAR; INTRAVENOUS at 21:59

## 2019-03-20 RX ADMIN — Medication 100 MILLIEQUIVALENT(S): at 10:21

## 2019-03-20 RX ADMIN — Medication 250 MILLIGRAM(S): at 17:27

## 2019-03-20 RX ADMIN — Medication 50 MILLIGRAM(S): at 02:38

## 2019-03-20 RX ADMIN — Medication 1000 MILLIGRAM(S): at 10:21

## 2019-03-20 RX ADMIN — Medication 50 GRAM(S): at 09:12

## 2019-03-20 RX ADMIN — MEROPENEM 100 MILLIGRAM(S): 1 INJECTION INTRAVENOUS at 21:59

## 2019-03-20 RX ADMIN — Medication 131.25 MICROGRAM(S)/KG/MIN: at 07:23

## 2019-03-20 RX ADMIN — Medication 40 MILLIEQUIVALENT(S): at 02:38

## 2019-03-20 RX ADMIN — Medication 1: at 12:40

## 2019-03-20 RX ADMIN — AZITHROMYCIN 250 MILLIGRAM(S): 500 TABLET, FILM COATED ORAL at 03:46

## 2019-03-20 RX ADMIN — Medication 3.75 MICROGRAM(S)/KG/MIN: at 12:34

## 2019-03-20 RX ADMIN — FENTANYL CITRATE 25 MICROGRAM(S): 50 INJECTION INTRAVENOUS at 15:23

## 2019-03-20 RX ADMIN — AZITHROMYCIN 250 MILLIGRAM(S): 500 TABLET, FILM COATED ORAL at 10:08

## 2019-03-20 RX ADMIN — Medication 100 MILLIEQUIVALENT(S): at 07:00

## 2019-03-20 RX ADMIN — Medication 102 MILLIGRAM(S): at 09:50

## 2019-03-20 RX ADMIN — MILRINONE LACTATE 11.98 MICROGRAM(S)/KG/MIN: 1 INJECTION, SOLUTION INTRAVENOUS at 16:49

## 2019-03-20 RX ADMIN — FENTANYL CITRATE 25 MICROGRAM(S): 50 INJECTION INTRAVENOUS at 15:08

## 2019-03-20 RX ADMIN — Medication 250 MILLIGRAM(S): at 04:52

## 2019-03-20 RX ADMIN — CHLORHEXIDINE GLUCONATE 1 APPLICATION(S): 213 SOLUTION TOPICAL at 21:59

## 2019-03-20 RX ADMIN — PROTHROMBIN COMPLEX CONCENTRATE (HUMAN) 400 INTERNATIONAL UNIT(S): 25.5; 16.5; 24; 22; 22; 26 POWDER, FOR SOLUTION INTRAVENOUS at 09:12

## 2019-03-20 RX ADMIN — Medication 50 MILLIGRAM(S): at 14:15

## 2019-03-20 RX ADMIN — Medication 400 MILLIGRAM(S): at 06:17

## 2019-03-20 RX ADMIN — MILRINONE LACTATE 11.98 MICROGRAM(S)/KG/MIN: 1 INJECTION, SOLUTION INTRAVENOUS at 09:50

## 2019-03-20 RX ADMIN — Medication 400 MILLIGRAM(S): at 09:53

## 2019-03-20 RX ADMIN — Medication 100 MILLIEQUIVALENT(S): at 08:45

## 2019-03-20 RX ADMIN — AMIODARONE HYDROCHLORIDE 600 MILLIGRAM(S): 400 TABLET ORAL at 08:15

## 2019-03-20 RX ADMIN — Medication 3.75 MICROGRAM(S)/KG/MIN: at 16:49

## 2019-03-20 RX ADMIN — PROTHROMBIN COMPLEX CONCENTRATE (HUMAN) 400 INTERNATIONAL UNIT(S): 25.5; 16.5; 24; 22; 22; 26 POWDER, FOR SOLUTION INTRAVENOUS at 00:04

## 2019-03-20 NOTE — H&P ADULT - NSHPPHYSICALEXAM_GEN_ALL_CORE
VITALS:  Vital Signs Last 24 Hrs  T(C): 38.8 (20 Mar 2019 05:50), Max: 38.8 (20 Mar 2019 05:50)  T(F): 101.8 (20 Mar 2019 05:50), Max: 101.8 (20 Mar 2019 05:50)  HR: 115 (20 Mar 2019 07:14) (86 - 133)  BP: 92/42 (20 Mar 2019 07:14) (80/59 - 113/72)  BP(mean): --  RR: 22 (20 Mar 2019 07:14) (15 - 22)  SpO2: 100% (20 Mar 2019 07:14) (95% - 100%)    PHYSICAL EXAM:  GEN: NAD, somnolent but arousable  HEENT: NCAT, right scalp laceration closed with surgical staples  PULM: symmetric chest rise bilaterally, no increased WOB  CV: tachycardic, S1 S2, peripheral pulses intact  ABD: soft, mildly distended, nontender +BS  EXTR: no cyanosis or edema, moving all extremities; superficial skin tear left distal shin  NEURO: somnolent but arousable, AOx3, PERRL, EOMI, no focal deficits, moving all extremities

## 2019-03-20 NOTE — CONSULT NOTE ADULT - SUBJECTIVE AND OBJECTIVE BOX
Requesting Physician : Dr. Robbins    Reason for Consultation: CHF    HISTORY OF PRESENT ILLNESS:  62 yo F with history of severe NICM s/p AICD on home milrinone, AF on ac with eliquis, HTN, DM, thyroid CA s/p thyroidectomy who is being seen for heart failure and cardiomyopathy.  The patient was admitted after a mechanical fall causing a SDH.  The patient denies syncope and reports slipping down the stairs at home.  She denies chest pain or anginal symptoms.  She was evaluated in the ED and transferred to the SICU for further workup.  NSx evaluated the patient; no surgery planned.  The patient was found to have fevers and hypotensive and started on broad spectrum Abx and pressors.          PAST MEDICAL & SURGICAL HISTORY:  Asthma  CHF (congestive heart failure): with EF of 10% s/p AICD  DM (diabetes mellitus)  HTN (hypertension)  Thyroid ca  AICD (automatic cardioverter/defibrillator) present  S/P thyroidectomy          MEDICATIONS:  MEDICATIONS  (STANDING):  azithromycin  IVPB 500 milliGRAM(s) IV Intermittent every 24 hours  aztreonam  IVPB 1000 milliGRAM(s) IV Intermittent every 8 hours  dextrose 5%. 1000 milliLiter(s) (50 mL/Hr) IV Continuous <Continuous>  dextrose 50% Injectable 12.5 Gram(s) IV Push once  dextrose 50% Injectable 25 Gram(s) IV Push once  dextrose 50% Injectable 25 Gram(s) IV Push once  insulin lispro (HumaLOG) corrective regimen sliding scale   SubCutaneous every 6 hours  milrinone Infusion 0.5 MICROgram(s)/kG/Min (11.985 mL/Hr) IV Continuous <Continuous>  norepinephrine Infusion 0.25 MICROgram(s)/kG/Min (37.453 mL/Hr) IV Continuous <Continuous>  vancomycin  IVPB 1000 milliGRAM(s) IV Intermittent every 12 hours      Allergies    Isordil (Headache)  penicillin (Rash)    Intolerances        FAMILY HISTORY:  No pertinent family history in first degree relatives    Non-contributary for premature coronary disease or sudden cardiac death    SOCIAL HISTORY:    [x ] Non-smoker  [ ] Smoker  [ ] Alcohol      REVIEW OF SYSTEMS:  [ ]chest pain  [  ]shortness of breath  [  ]palpitations  [  ]syncope  [ ]near syncope [ ]upper extremity weakness   [ ] lower extremity weakness  [  ]diplopia  [  ]altered mental status   [  ]fevers  [ ]chills [ ]nausea  [ ]vomitting  [  ]dysphagia    [ ]abdominal pain  [ ]melena  [ ]BRBPR    [  ]epistaxis  [  ]rash    [ ]lower extremity edema        [x ] All others negative	  [ ] Unable to obtain    PHYSICAL EXAM:  T(C): 38.3 (03-20-19 @ 07:47), Max: 38.8 (03-20-19 @ 05:50)  HR: 99 (03-20-19 @ 10:45) (86 - 133)  BP: 86/47 (03-20-19 @ 10:45) (69/48 - 115/60)  RR: 24 (03-20-19 @ 10:45) (15 - 24)  SpO2: 98% (03-20-19 @ 10:45) (94% - 100%)  Wt(kg): --  I&O's Summary    20 Mar 2019 07:01  -  20 Mar 2019 11:32  --------------------------------------------------------  IN: 682 mL / OUT: 20 mL / NET: 662 mL          	  Lymphatic: No lymphadenopathy trace edema in LE bilaterally   Cardiovascular: Normal S1 S2, + JVD, No murmurs , Peripheral pulses palpable 2+ bilaterally  Respiratory: Lungs clear to auscultation  Gastrointestinal:  Soft, Non-tender, + BS	  Skin: No rashes, No ecchymoses, No cyanosis, warm to touch  Psychiatry:  Mood & affect appropriate      TELEMETRY: 	    ECG: < from: 12 Lead ECG (03.20.19 @ 05:58) >  WIDE QRS TACHYCARDIA  LEFT AXIS DEVIATION  RIGHT BUNDLE BRANCH BLOCK  LEFT VENTRICULAR HYPERTROPHY WITH REPOLARIZATION ABNORMALITY  INFERIOR INFARCT , AGE UNDETERMINED  ABNORMAL ECG    < end of copied text >    	  RADIOLOGY:  OTHER:     DIAGNOSTIC TESTING:  [ ] Echocardiogram: < from: TTE with Doppler (w/Cont) (11.07.18 @ 05:53) >  Conclusions:  1. Tethered mitral valve leaflets with normal opening.  Mitral annular calcification and thickened  mitral leaflet  tips Moderate mitral regurgitation.  2. Calcified trileaflet aortic valve with normal opening.  3. Moderately dilated left atrium.  LA volume index = 43  cc/m2.  4. Eccentric left ventricular hypertrophy (dilated left  ventricle with normal relative wall thickness).  5. Severe global left ventricularsystolic dysfunction.  Endocardial visualization enhanced with intravenous  injection of Ultrasonic Enhancing Agent (Definity). No LV  thrombus.  Septal flattening consistent with right  ventricular overload.  6. Severe diastolic dysfunction with elevated filling  pressures  7. Severe right atrial enlargement.  8. Right ventricular enlargement with decreased right  ventricular systolic function.  A device wire is noted in  the right heart.  9. Dilated tricuspid annulus with malcoaptation of  tricuspid leaflets. Severe tricuspid regurgitation.  *** No previous Echo exam.    < end of copied text >    [ ]  Catheterization:  [ ] Stress Test:    	  	  LABS:	 	    CARDIAC MARKERS:  CARDIAC MARKERS ( 20 Mar 2019 08:41 )  x     / x     / 90 U/L / x     / 2.4 ng/mL                              10.9   14.64 )-----------( 194      ( 20 Mar 2019 08:44 )             34.1     03-20    129<L>  |  85<L>  |  27<H>  ----------------------------<  128<H>  3.7   |  24  |  1.27    Ca    7.9<L>      20 Mar 2019 08:41  Phos  3.5     03-20  Mg     1.2     03-20    TPro  7.1  /  Alb  2.7<L>  /  TBili  6.1<H>  /  DBili  x   /  AST  58<H>  /  ALT  36  /  AlkPhos  525<H>  03-20    proBNP:   Lipid Profile:   HgA1c:   TSH:     ASSESSMENT/PLAN:  62 yo F with history of severe NICM s/p AICD on home milrinone, AF on ac with eliquis, HTN, DM, thyroid CA s/p thyroidectomy who is being seen for heart failure and cardiomyopathy.    -APT and ac on hold due to SDH  -follow up neurosurgery  -continue with inotropic support with milrinone  -heart failure consultation for CHF management  -check AICD interrogation  -EP consult with Dr. Hallman for management of af  -continue with supportive care per ASHKAN Muller MD

## 2019-03-20 NOTE — CONSULT NOTE ADULT - SUBJECTIVE AND OBJECTIVE BOX
Consulting surgical team: ACS, [id number]  Consulting attending: Dr. Pickett    HPI:  62 yo woman with a hx of severe CHF (EF 10%) on a milrinone drip via PICC at home,       PAST MEDICAL HISTORY:  Asthma  CHF (congestive heart failure)  DM (diabetes mellitus)  HTN (hypertension)  Thyroid ca      PAST SURGICAL HISTORY:  AICD (automatic cardioverter/defibrillator) present  S/P thyroidectomy      MEDICATIONS:  aztreonam  IVPB 1000 milliGRAM(s) IV Intermittent once  potassium chloride    Tablet ER 40 milliEquivalent(s) Oral once  vancomycin  IVPB 1000 milliGRAM(s) IV Intermittent once      ALLERGIES:  Isordil (Headache)  penicillin (Rash)      VITALS & I/Os:  Vital Signs Last 24 Hrs  T(C): 36.7 (19 Mar 2019 23:04), Max: 36.7 (19 Mar 2019 23:04)  T(F): 98.1 (19 Mar 2019 23:04), Max: 98.1 (19 Mar 2019 23:04)  HR: 97 (19 Mar 2019 23:04) (97 - 97)  BP: 113/72 (19 Mar 2019 23:04) (113/72 - 113/72)  BP(mean): --  RR: 18 (19 Mar 2019 23:04) (18 - 18)  SpO2: 97% (19 Mar 2019 23:04) (97% - 97%)    I&O's Summary      PHYSICAL EXAM:  General: No acute distress  Respiratory: Nonlabored  Cardiovascular: RRR  Abdominal: Soft, nondistended, nontender. No rebound or guarding. No organomegaly, no palpable mass.  Extremities: Warm    LABS:                        10.8   20.8  )-----------( 256      ( 19 Mar 2019 20:16 )             34.0     03-    131<L>  |  87<L>  |  23  ----------------------------<  123<H>  3.1<L>   |  29  |  1.07    Ca    7.9<L>      20 Mar 2019 00:10    TPro  8.0  /  Alb  3.0<L>  /  TBili  4.7<H>  /  DBili  x   /  AST  105<H>  /  ALT  43  /  AlkPhos  451<H>      Lactate: Lactate, Blood: 1.5 mmol/L ( @ 00:10)    @ 20:36  2.5    PT/INR - ( 20 Mar 2019 01:04 )   PT: 18.5 sec;   INR: 1.60 ratio         PTT - ( 20 Mar 2019 01:04 )  PTT:28.5 sec          Urinalysis Basic - ( 19 Mar 2019 20:28 )    Color: Light Yellow / Appearance: Clear / S.008 / pH: x  Gluc: x / Ketone: Negative  / Bili: Negative / Urobili: Negative   Blood: x / Protein: Negative / Nitrite: Negative   Leuk Esterase: Negative / RBC: x / WBC x   Sq Epi: x / Non Sq Epi: x / Bacteria: x        IMAGING: Consulting surgical team: ACS, [id number]  Consulting attending: Dr. Pickett    HPI:  62 yo woman with a hx of severe CHF (EF 10%) on a milrinone drip via PICC at home, DM, HTN, thyroid CA presents after a mechanical fall down 14 steps. The patient reports striking her head but denies LOC. She was able to ambulate after falling. She is currently complaining of pain in her head and her left shin.      PAST MEDICAL HISTORY:  Asthma  CHF (congestive heart failure)  DM (diabetes mellitus)  HTN (hypertension)  Thyroid ca      PAST SURGICAL HISTORY:  AICD (automatic cardioverter/defibrillator) present  S/P thyroidectomy      MEDICATIONS:  aztreonam  IVPB 1000 milliGRAM(s) IV Intermittent once  potassium chloride    Tablet ER 40 milliEquivalent(s) Oral once  vancomycin  IVPB 1000 milliGRAM(s) IV Intermittent once      ALLERGIES:  Isordil (Headache)  penicillin (Rash)      VITALS & I/Os:  Vital Signs Last 24 Hrs  T(C): 36.7 (19 Mar 2019 23:04), Max: 36.7 (19 Mar 2019 23:04)  T(F): 98.1 (19 Mar 2019 23:04), Max: 98.1 (19 Mar 2019 23:04)  HR: 97 (19 Mar 2019 23:04) (97 - 97)  BP: 113/72 (19 Mar 2019 23:04) (113/72 - 113/72)  BP(mean): --  RR: 18 (19 Mar 2019 23:04) (18 - 18)  SpO2: 97% (19 Mar 2019 23:04) (97% - 97%)      PHYSICAL EXAM:  GEN: NAD, resting quietly  HEENT: NCAT, right scalp laceration closed with surgical staples  PULM: symmetric chest rise bilaterally, no increased WOB  CV: regular rate, peripheral pulses intact  ABD: soft, NTND  EXTR: no cyanosis or edema, moving all extremities; superficial skin tear left distal shin      LABS:                        10.8   20.8  )-----------( 256      ( 19 Mar 2019 20:16 )             34.0     03-20    131<L>  |  87<L>  |  23  ----------------------------<  123<H>  3.1<L>   |  29  |  1.07    Ca    7.9<L>      20 Mar 2019 00:10    TPro  8.0  /  Alb  3.0<L>  /  TBili  4.7<H>  /  DBili  x   /  AST  105<H>  /  ALT  43  /  AlkPhos  451<H>      Lactate: Lactate, Blood: 1.5 mmol/L ( @ 00:10)    @ 20:36  2.5    PT/INR - ( 20 Mar 2019 01:04 )   PT: 18.5 sec;   INR: 1.60 ratio         PTT - ( 20 Mar 2019 01:04 )  PTT:28.5 sec    Urinalysis Basic - ( 19 Mar 2019 20:28 )    Color: Light Yellow / Appearance: Clear / S.008 / pH: x  Gluc: x / Ketone: Negative  / Bili: Negative / Urobili: Negative   Blood: x / Protein: Negative / Nitrite: Negative   Leuk Esterase: Negative / RBC: x / WBC x   Sq Epi: x / Non Sq Epi: x / Bacteria: x      IMAGING:  < from: CT Head No Cont (19 @ 22:23) >  IMPRESSION:    Head CT: Small acute hematoma along the right convexity.    Cervical spine CT: No fracture or traumatic malalignment in the cervical   spine. If there is clinical suspicion for acute fracture or   ligamentous/cord injury, MRI may be obtained for further evaluation.    < end of copied text >    < from: CT Cervical Spine No Cont (19 @ 21:50) >  IMPRESSION:    Head CT: Small acute hematoma along the right convexity.    Cervical spine CT: No fracture or traumatic malalignment in the cervical   spine. If there is clinical suspicion for acute fracture or   ligamentous/cord injury, MRI may be obtained for further evaluation.    < end of copied text >    < from: CT Thoracic Spine Reform No Cont (19 @ 22:32) >  IMPRESSION:     No obvious acute intrathoracic, intra-abdominal or pelvic solid oval,   visceral or vascular injury, given the extent of artifact. No obvious   acute fracture or traumatic malalignment.    Groundglass opacities in the right middle and right lower lobes, which   were noted on 2018. Differential diagnosis includes pulmonary edema,   infection and inflammation although neoplasm cannot be excluded.   Recommend follow-up.    < end of copied text >    < from: CT Chest w/ IV Cont (19 @ 21:55) >  IMPRESSION:     No obvious acute intrathoracic, intra-abdominal or pelvic solid oval,   visceral or vascular injury, given the extent of artifact. No obvious   acute fracture or traumatic malalignment.    Groundglass opacities in the right middle and right lower lobes, which   were noted on 2018. Differential diagnosis includes pulmonary edema,   infection and inflammation although neoplasm cannot be excluded.   Recommend follow-up.    < end of copied text >    < from: CT Abdomen and Pelvis w/ IV Cont (19 @ 22:06) >  IMPRESSION:     No obvious acute intrathoracic, intra-abdominal or pelvic solid oval,   visceral or vascular injury, given the extent of artifact. No obvious   acute fracture or traumatic malalignment.    Groundglass opacities in the right middle and right lower lobes, which   were noted on 2018. Differential diagnosis includes pulmonary edema,   infection and inflammation although neoplasm cannot be excluded.   Recommend follow-up.    < end of copied text >

## 2019-03-20 NOTE — CONSULT NOTE ADULT - ASSESSMENT
63 year old female PMH severe cardiomyopathy c/b CHF w EF 10% s/p AICD on home milrinone infusion via PICC, afib on aspirin & Eliquis, DM2, HTN, thyroid cancer s/p thyroidectomy who presented for mechanical fall on 3/19/19. In the ED febrile to 101.8, tachycardic to > 130, hypotensive to SBP in the 80's. WBC on presentation of 20.8 with 83% PMN. Transaminitis with Alk Phos of 451, , ALT of 43 and T Bili of 4.7. Lactic acid of 2.5. U/A with negative leukocyte esterase and negative nitrites. RVP negative. CT Chest/Abdomen/Pelvis with no obvious intraabdominal pathology and chest with ground glass opacities in the right middle and right lower lobes (present on 11/6/18). Blood cultures resulted with Klebsiella pneumoniae.     Overall, Klebsiella bacteremia likely due to PICC infection - patient with chills with Milrinone infusion changes. Pneumonia is possible however, the ground glass opacities in the lungs were present back in 11/2018. Unsure about etiology of new leukocytosis today - would obtain differentiation and repeat CBC. Would escalate from Aztreonam to Meropenem given vasopressor requirements. Would continue Vancomycin until BCx at 48 hours.    --Recommend stopping Aztreonam - Recommend Meropenem 1g IV Q12H  --Recommend decreasing Vancomycin to 1g IV Q24H. Trough prior to third dose.  --Followup Prelim Blood Cultures  --Agree with plan to remove PICC line  --Followup susceptibilities of Klebsiella pneumoniae 63 year old female PMH severe cardiomyopathy c/b CHF w EF 10% s/p AICD on home milrinone infusion via PICC, afib on aspirin & Eliquis, DM2, HTN, thyroid cancer s/p thyroidectomy who presented for mechanical fall on 3/19/19. In the ED febrile to 101.8, tachycardic to > 130, hypotensive to SBP in the 80's. WBC on presentation of 20.8 with 83% PMN. Transaminitis with Alk Phos of 451, , ALT of 43 and T Bili of 4.7. Lactic acid of 2.5. U/A with negative leukocyte esterase and negative nitrites. RVP negative. CT Chest/Abdomen/Pelvis with no obvious intraabdominal pathology and chest with ground glass opacities in the right middle and right lower lobes (present on 11/6/18). Blood cultures resulted with Klebsiella pneumoniae.     Overall, Klebsiella bacteremia likely due to PICC infection - patient with chills with Milrinone infusion changes. Pneumonia is possible however, the ground glass opacities in the lungs were present back in 11/2018. Unsure about etiology of new leukocytosis today - would obtain differentiation and repeat CBC. Would escalate from Aztreonam to Meropenem given vasopressor requirements. Would continue Vancomycin until BCx at 48 hours.    --Recommend stopping Aztreonam - Recommend Meropenem 1g IV Q12H  --Recommend decreasing Vancomycin to 1g IV Q24H. Trough prior to third dose., may be able to d/ c this if blood cultures are negative for GP organisms for 48 hours  --Followup Prelim Blood Cultures  --Agree with plan to remove PICC line  --Followup susceptibilities of Klebsiella pneumoniae

## 2019-03-20 NOTE — H&P ADULT - HISTORY OF PRESENT ILLNESS
62 yo F h/o severe cardiomyopathy c/b CHF on home milrinone infusion via PICC, DM2, HTN, thyroid cancer presents for mechanical fall. Pt brought to ED for mechanical fall down 14 steps, denied loss of consciousness. Pt reports headache & back pain since the fall.    ED vitals T 38.8, HR 86 - 133, BP 80/59 - 113/72, RR 15 - 18, SpO2 95 - 100%. Head CT revealed SDH. Pt was given vanc, azithro, aztreonam, tylenol, and kaycentra. Initially admitted to medicine, then became hypotensive, started on levophed gtt and transferred to MICU. 64 yo F h/o severe cardiomyopathy c/b CHF on home milrinone infusion via PICC, DM2, HTN, thyroid cancer presents for mechanical fall. Pt brought to ED for mechanical fall down 14 steps, denied loss of consciousness. Pt reports headache & back pain since the fall. She was able to ambulate after falling. On admission she complained of pain in her head and her left shin.      ED vitals T 38.8, HR 86 - 133, BP 80/59 - 113/72, RR 15 - 18, SpO2 95 - 100%. CT Head, C-spine, thoracic spine, C/A/P performed demonstrating SDH. Pt was given vanc, azithro, aztreonam, tylenol, and kaycentra. Seen by trauma surgery & neurosurgery, initially admitted to medicine, then became hypotensive, started on levophed gtt and transferred to MICU. 64 yo F h/o severe cardiomyopathy c/b CHF on home milrinone infusion via PICC, DM2, HTN, thyroid cancer presents for mechanical fall. Pt brought to ED for mechanical fall down 14 steps, denied loss of consciousness. Pt reports headache & back pain since the fall. She was able to ambulate after falling. On admission she complained of pain in her head and her left shin.      ED vitals T 38.8, HR 86 - 133, BP 80/59 - 113/72, RR 15 - 18, SpO2 95 - 100%. CT Head, C-spine, thoracic spine, C/A/P performed demonstrating SDH. Pt was given vanc, azithro, aztreonam, tylenol, kaycentra, and 1 L NS. Seen by trauma surgery & neurosurgery, initially admitted to medicine, then became hypotensive, started on levophed gtt and transferred to MICU. 64 yo F h/o severe cardiomyopathy c/b CHF on home milrinone infusion via PICC, DM2, HTN, thyroid cancer presents for mechanical fall. Pt brought to ED for mechanical fall down 14 steps, denied loss of consciousness. Pt reports headache & back pain since the fall. She was able to ambulate after falling. On admission she complained of pain in her head and her left shin.    ED vitals were T 38.8, HR 86 - 133, BP 80/59 - 113/72, RR 15 - 18, SpO2 95 - 100%. CT Head, C-spine, thoracic spine, C/A/P performed demonstrating SDH. Pt was given vanc, azithro, aztreonam, tylenol, kaycentra, and 1 L NS. Seen by trauma surgery & neurosurgery, initially admitted to medicine, then became hypotensive, started on levophed gtt and transferred to MICU. 64 yo F h/o severe cardiomyopathy c/b CHF on home milrinone infusion via PICC, DM2, HTN, thyroid cancer presents for mechanical fall. Pt brought to ED for mechanical fall down 14 steps, denied loss of consciousness. Pt reports headache & back pain since the fall. She was able to ambulate after falling. On admission she complained of pain in her head and her left shin.    ED vitals were T 38.8, HR 86 - 133, BP 80/59 - 113/72, RR 15 - 18, SpO2 95 - 100%. CT Head, C-spine, thoracic spine, C/A/P performed demonstrating SDH. Pt was given vanc, azithro, aztreonam, tylenol, kaycentra, and 1 L NS. Seen by trauma surgery & neurosurgery, initially admitted to medicine, then became lethargic & hypotensive, started on levophed gtt and transferred to MICU. 62 yo F h/o severe cardiomyopathy c/b CHF w EF 10% s/p AICD on home milrinone infusion via PICC, afib on aspirin & elliquis, DM2, HTN, thyroid cancer s/p thyroidectomy presents for mechanical fall. Pt brought to ED for mechanical fall down 14 steps, denied loss of consciousness. Pt reports headache & back pain since the fall. She was able to ambulate after falling. On admission she complained of pain in her head and her left shin.    ED vitals were T 38.8, HR 86 - 133, BP 80/59 - 113/72, RR 15 - 18, SpO2 95 - 100%. CT Head, C-spine, thoracic spine, C/A/P performed demonstrating SDH. Pt was given vanc, azithro, aztreonam, tylenol, kaycentra, and 1 L NS. Seen by trauma surgery & neurosurgery, initially admitted to medicine, then became lethargic & hypotensive, started on levophed gtt and transferred to MICU.

## 2019-03-20 NOTE — H&P ADULT - NSICDXPASTMEDICALHX_GEN_ALL_CORE_FT
PAST MEDICAL HISTORY:  Asthma     CHF (congestive heart failure) with EF of 10% s/p AICD    DM (diabetes mellitus)     HTN (hypertension)     Thyroid ca

## 2019-03-20 NOTE — CHART NOTE - NSCHARTNOTEFT_GEN_A_CORE
Pt was seen and examined.  Briefly this is a female c hx HTN DM NICM pw mechanical fall  Febrile illness  Hypervolemic hyponatremia  Hypomagnesemia    Suggest  -Once allowed to eat then restart Mag oxide 400mg po tid x 3 days; Oscal 3 tabs a day and Rocaltrol .5mg po qd  -Congestive hepatology?  SP IVF in er.  At present on pressors and not maintaining MAP;  Issue of febrile illness on IV abx.  Would a calixto help?  -Heart failure consult          Sayed Yaneth  [facility]  [phone]

## 2019-03-20 NOTE — ED ADULT NURSE REASSESSMENT NOTE - NS ED NURSE REASSESS COMMENT FT1
MD Henry at bedside. US guided line placed by MD Henry in left forearm. MD Henry doing bedside echo. pt appears altered and lethargic but when stimulated is answering questions. MICU consult called

## 2019-03-20 NOTE — CONSULT NOTE ADULT - SUBJECTIVE AND OBJECTIVE BOX
EP ATTENDING    History: She is a pleasant 62 y/o female PMH paroxysmal atrial fibrillation and a severe NICM (LVEF 10-15%) who had a Medtronic Biv-ICD implanted at [place]. A LHC at [place] was also unremarkable. She now returns with a mechanical fall resulting in a subdural hematoma. A/C on hold as per neurosurgery. EP is now called for further recommendations and consideration for Watchman if she can't tolerate a/c long term. Review of her EKGs and tele show the rhythm is sinus with adequate BiV-tracking (RBBB in V1, negative in I/L). She hasn't had any more significant NSVT despite milrinone.    PAST MEDICAL & SURGICAL HISTORY:  Asthma  NICM  DM (diabetes mellitus)  HTN (hypertension)  paroxysmal atrial fibrillation    PShx: thyroid CA s/p thyroidectomy, Medtronic BiV-ICD    Allergies: penicillin    MEDICATIONS  (STANDING):  aztreonam  IVPB 1000 milliGRAM(s) IV Intermittent every 8 hours  dextrose 5%. 1000 milliLiter(s) (50 mL/Hr) IV Continuous <Continuous>  dextrose 50% Injectable 12.5 Gram(s) IV Push once  dextrose 50% Injectable 25 Gram(s) IV Push once  dextrose 50% Injectable 25 Gram(s) IV Push once  insulin lispro (HumaLOG) corrective regimen sliding scale   SubCutaneous every 6 hours  milrinone Infusion 0.5 MICROgram(s)/kG/Min (11.985 mL/Hr) IV Continuous <Continuous>  norepinephrine Infusion 0.05 MICROgram(s)/kG/Min (3.745 mL/Hr) IV Continuous <Continuous>  vancomycin  IVPB 1000 milliGRAM(s) IV Intermittent every 12 hours      FAMILY HISTORY:  No pertinent family history in first degree relatives  Non-contributary for premature coronary disease or sudden cardiac death    SOCIAL HISTORY:    [x ] Non-smoker  [ ] Smoker  [ ] Alcohol      REVIEW OF SYSTEMS:  [ ]chest pain  [ x ]shortness of breath  [  ]palpitations  [  ]syncope  [ ]near syncope [ ]upper extremity weakness   [ ] lower extremity weakness  [  ]diplopia  [ x ]altered mental status   [  ]fevers  [ ]chills [ ]nausea  [ ]vomitting  [  ]dysphagia    [ ]abdominal pain  [ ]melena  [ ]BRBPR    [  ]epistaxis  [  ]rash    [ ]lower extremity edema        [x ] All others negative	  [ ] Unable to obtain    PHYSICAL EXAM:  T(C): 37.2 (03-20-19 @ 09:30), Max: 38.8 (03-20-19 @ 05:50)  HR: 106 (03-20-19 @ 13:45) (86 - 133)  BP: 129/88 (03-20-19 @ 13:45) (69/48 - 129/88)  RR: 31 (03-20-19 @ 13:45) (10 - 31)  SpO2: 99% (03-20-19 @ 13:45) (94% - 100%)  Wt(kg): --    JVP 8  tachy, no murmurs  CTAB  soft nt/nd  no c/c/e    TELEMETRY: 	    ECG:  	    Echo:  NST:  Cath:  	  	  LABS:	 	                          10.9   14.64 )-----------( 194      ( 20 Mar 2019 08:44 )             34.1     03-20    129<L>  |  85<L>  |  27<H>  ----------------------------<  128<H>  3.7   |  24  |  1.27    Ca    7.9<L>      20 Mar 2019 08:41  Phos  3.5     03-20  Mg     1.2     03-20    TPro  7.1  /  Alb  2.7<L>  /  TBili  6.1<H>  /  DBili  x   /  AST  58<H>  /  ALT  36  /  AlkPhos  525<H>  03-20    proBNP:   Lipid Profile:   HgA1c:   TSH: Thyroid Stimulating Hormone, Serum: 6.18 uIU/mL (03-20 @ 11:16)      A/P) She is a pleasant 62 y/o female PMH paroxysmal atrial fibrillation and a severe NICM (LVEF 10-15%) who had a Medtronic Biv-ICD implanted at [place]. A LHC at [place] was also unremarkable. She now returns with a mechanical fall resulting in a subdural hematoma. A/C on hold as per neurosurgery. EP is now called for further recommendations and consideration for Watchman if she can't tolerate a/c long term. Review of her EKGs and tele show the rhythm is sinus with adequate BiV-tracking (RBBB in V1, negative in I/L). She hasn't had any more significant NSVT despite milrinone.    -medical therapy for CHF as per cardiology  -f/u neurosurgery regarding if/when anticoagulation for PAF with elevated chads-vasc can ever be started  -if she can't tolerate a/c long term then next step would be outpatient left atrial appendage occlusion with Watchman, but this requires at least 45 days of full anticoagulation, or at least DAPT as per ASAP-TOO trial.   -supportive care as per SICU  -will follow  -no specific EP intervention indicated at this time      Dariana Hallman M.D., RUST  Cardiac Electrophysiology  [facility]  [address] [place] [address], [address] [place] [address]  [email or web address]    office [phone]  pager [phone]

## 2019-03-20 NOTE — CONSULT NOTE ADULT - SUBJECTIVE AND OBJECTIVE BOX
Patient is a 63y old  Female who presents with a chief complaint of mechanical fall       HPI:  62 yo F h/o severe cardiomyopathy c/b CHF w EF 10% s/p AICD on home milrinone infusion via PICC, afib on aspirin & elliquis, DM2, HTN, thyroid cancer s/p thyroidectomy presents for mechanical fall. Pt brought to ED for mechanical fall down 14 steps, denied loss of consciousness. Pt reports headache & back pain since the fall. She was able to ambulate after falling. On admission she complained of pain in her head and her left shin.    ED vitals were T 38.8, HR 86 - 133, BP 80/59 - 113/72, RR 15 - 18, SpO2 95 - 100%. CT Head, C-spine, thoracic spine, C/A/P performed demonstrating SDH. Pt was given vanc, azithro, aztreonam, tylenol, kaycentra, and 1 L NS. Seen by trauma surgery & neurosurgery, initially admitted to my service , then became lethargic & hypotensive, started on levophed gtt and transferred to MICU.      PAST MEDICAL & SURGICAL HISTORY:  Asthma  CHF (congestive heart failure): with EF of 10% s/p AICD  DM (diabetes mellitus)  HTN (hypertension)  Thyroid ca  AICD (automatic cardioverter/defibrillator) present  S/P thyroidectomy      Social History: Denies smoking ,alcohol or drugs.     FAMILY HISTORY:  No pertinent family history in first degree relatives      Allergies    Isordil (Headache)  penicillin (Rash)    Intolerances        REVIEW OF SYSTEMS:    CONSTITUTIONAL: No fever, weight loss, or fatigue  EYES: No eye pain, visual disturbances, or discharge  RESPIRATORY:  cough, o shortness of breath  CARDIOVASCULAR: No chest pain, palpitations, dizziness, or leg swelling  GASTROINTESTINAL: No abdominal or epigastric pain. No nausea, vomiting, or hematemesis; No diarrhea or constipation. No melena or hematochezia.  GENITOURINARY: No dysuria, frequency, hematuria, or incontinence  NEUROLOGICAL: headache   SKIN: No itching, burning, rashes, or lesions   ENDOCRINE: No heat or cold intolerance; No hair loss  MUSCULOSKELETAL: No joint pain or swelling; No muscle, back, or extremity pain  PSYCHIATRIC: No depression, anxiety, mood swings, or difficulty sleeping      MEDICATIONS  (STANDING):  azithromycin  IVPB 500 milliGRAM(s) IV Intermittent every 24 hours  aztreonam  IVPB 1000 milliGRAM(s) IV Intermittent every 8 hours  dextrose 5%. 1000 milliLiter(s) (50 mL/Hr) IV Continuous <Continuous>  dextrose 50% Injectable 12.5 Gram(s) IV Push once  dextrose 50% Injectable 25 Gram(s) IV Push once  dextrose 50% Injectable 25 Gram(s) IV Push once  insulin lispro (HumaLOG) corrective regimen sliding scale   SubCutaneous every 6 hours  milrinone Infusion 0.5 MICROgram(s)/kG/Min (11.985 mL/Hr) IV Continuous <Continuous>  norepinephrine Infusion 0.05 MICROgram(s)/kG/Min (3.745 mL/Hr) IV Continuous <Continuous>  vancomycin  IVPB 1000 milliGRAM(s) IV Intermittent every 12 hours    MEDICATIONS  (PRN):  dextrose 40% Gel 15 Gram(s) Oral once PRN Blood Glucose LESS THAN 70 milliGRAM(s)/deciliter  glucagon  Injectable 1 milliGRAM(s) IntraMuscular once PRN Glucose LESS THAN 70 milligrams/deciliter      Vital Signs Last 24 Hrs  T(C): 37.2 (20 Mar 2019 09:30), Max: 38.8 (20 Mar 2019 05:50)  T(F): 99 (20 Mar 2019 09:30), Max: 101.8 (20 Mar 2019 05:50)  HR: 94 (20 Mar 2019 13:00) (86 - 133)  BP: 89/51 (20 Mar 2019 13:00) (69/48 - 116/64)  BP(mean): 64 (20 Mar 2019 13:00) (53 - 85)  RR: 14 (20 Mar 2019 13:00) (10 - 24)  SpO2: 97% (20 Mar 2019 13:00) (94% - 100%)    PHYSICAL EXAM:    GENERAL: NAD, well-groomed, well-developed  HEAD:  Atraumatic, Normocephalic with stitches ++  EYES: EOMI, PERRLA, conjunctiva and sclera clear  NECK: JVD++  NERVOUS SYSTEM:  Alert & Oriented X3, No focal sign   CHEST/LUNG: Clear except bases ; No rales, rhonchi, wheezing, or rubs  HEART: Regular rate and rhythm; SSM+  ABDOMEN: Soft, Nontender, Nondistended; Bowel sounds present  EXTREMITIES:  2+ Peripheral Pulses, No clubbing, cyanosis, but  edema    LABS:                        10.9   14.64 )-----------( 194      ( 20 Mar 2019 08:44 )             34.1     03-20    129<L>  |  85<L>  |  27<H>  ----------------------------<  128<H>  3.7   |  24  |  1.27    Ca    7.9<L>      20 Mar 2019 08:41  Phos  3.5     -  Mg     1.2     -    TPro  7.1  /  Alb  2.7<L>  /  TBili  6.1<H>  /  DBili  x   /  AST  58<H>  /  ALT  36  /  AlkPhos  525<H>  20    PT/INR - ( 20 Mar 2019 08:41 )   PT: 20.4 sec;   INR: 1.76 ratio         PTT - ( 20 Mar 2019 08:41 )  PTT:25.9 sec  Urinalysis Basic - ( 19 Mar 2019 20:28 )    Color: Light Yellow / Appearance: Clear / S.008 / pH: x  Gluc: x / Ketone: Negative  / Bili: Negative / Urobili: Negative   Blood: x / Protein: Negative / Nitrite: Negative   Leuk Esterase: Negative / RBC: x / WBC x   Sq Epi: x / Non Sq Epi: x / Bacteria: x          RADIOLOGY & ADDITIONAL STUDIES:

## 2019-03-20 NOTE — H&P ADULT - NSHPLABSRESULTS_GEN_ALL_CORE
LABS:                          10.8   20.8  )-----------( 256      ( 19 Mar 2019 20:16 )             34.0     03-20    130<L>  |  84<L>  |  24<H>  ----------------------------<  158<H>  3.1<L>   |  27  |  1.07    Ca    8.4      20 Mar 2019 06:03  Mg     1.2     -20    TPro  7.7  /  Alb  3.3  /  TBili  6.7<H>  /  DBili  x   /  AST  52<H>  /  ALT  39  /  AlkPhos  645<H>  -    LIVER FUNCTIONS - ( 20 Mar 2019 06:03 )  Alb: 3.3 g/dL / Pro: 7.7 g/dL / ALK PHOS: 645 U/L / ALT: 39 U/L / AST: 52 U/L / GGT: x           PT/INR - ( 20 Mar 2019 06:04 )   PT: 19.9 sec;   INR: 1.72 ratio      PTT - ( 20 Mar 2019 06:04 )  PTT:31.8 sec    Urinalysis Basic - ( 19 Mar 2019 20:28 )    Color: Light Yellow / Appearance: Clear / S.008 / pH: x  Gluc: x / Ketone: Negative  / Bili: Negative / Urobili: Negative   Blood: x / Protein: Negative / Nitrite: Negative   Leuk Esterase: Negative / RBC: x / WBC x   Sq Epi: x / Non Sq Epi: x / Bacteria: x      RADIOLOGY:    3/19 CT Head & Cervical spine:    Head CT: Small acute hematoma along the right convexity.    Cervical spine CT: No fracture or traumatic malalignment in the cervical   spine. If there is clinical suspicion for acute fracture or   ligamentous/cord injury, MRI may be obtained for further evaluation.    3/19 CT Chest, Abdomen, Pelvis, & Thoracic spine:    No obvious acute intrathoracic, intra-abdominal or pelvic solid oval,   visceral or vascular injury, given the extent of artifact. No obvious   acute fracture or traumatic malalignment.    Groundglass opacities in the right middle and right lower lobes, which   were noted on 2018. Differential diagnosis includes pulmonary edema,   infection and inflammation although neoplasm cannot be excluded.   Recommend follow-up.    Additional findings as described.      3/20 CT Head:    No significant interval change since CT head obtained LABS:                          10.8   20.8  )-----------( 256      ( 19 Mar 2019 20:16 )             34.0     03-20    130<L>  |  84<L>  |  24<H>  ----------------------------<  158<H>  3.1<L>   |  27  |  1.07    Ca    8.4      20 Mar 2019 06:03  Mg     1.2     -20    TPro  7.7  /  Alb  3.3  /  TBili  6.7<H>  /  DBili  x   /  AST  52<H>  /  ALT  39  /  AlkPhos  645<H>  -    LIVER FUNCTIONS - ( 20 Mar 2019 06:03 )  Alb: 3.3 g/dL / Pro: 7.7 g/dL / ALK PHOS: 645 U/L / ALT: 39 U/L / AST: 52 U/L / GGT: x           PT/INR - ( 20 Mar 2019 06:04 )   PT: 19.9 sec;   INR: 1.72 ratio      PTT - ( 20 Mar 2019 06:04 )  PTT:31.8 sec    Urinalysis Basic - ( 19 Mar 2019 20:28 )    Color: Light Yellow / Appearance: Clear / S.008 / pH: x  Gluc: x / Ketone: Negative  / Bili: Negative / Urobili: Negative   Blood: x / Protein: Negative / Nitrite: Negative   Leuk Esterase: Negative / RBC: x / WBC x   Sq Epi: x / Non Sq Epi: x / Bacteria: x      RADIOLOGY:    3/19 CT Head & Cervical spine:    Head CT: Small acute hematoma along the right convexity.    Cervical spine CT: No fracture or traumatic malalignment in the cervical   spine. If there is clinical suspicion for acute fracture or   ligamentous/cord injury, MRI may be obtained for further evaluation.    3/19 CT Chest, Abdomen, Pelvis, & Thoracic spine:    No obvious acute intrathoracic, intra-abdominal or pelvic solid oval,   visceral or vascular injury, given the extent of artifact. No obvious   acute fracture or traumatic malalignment.    Groundglass opacities in the right middle and right lower lobes, which   were noted on 2018. Differential diagnosis includes pulmonary edema,   infection and inflammation although neoplasm cannot be excluded.   Recommend follow-up.    Additional findings as described.      3/20 CT Head:    No significant interval change since CT head obtained      2018 TTE:  - LVEF 10%  1. Tethered mitral valve leaflets with normal opening. Mitral annular calcification and thickened  mitral leaflet tips Moderate mitral regurgitation.  2. Calcified trileaflet aortic valve with normal opening.  3. Moderately dilated left atrium.  LA volume index = 43 cc/m2.  4. Eccentric left ventricular hypertrophy (dilated left ventricle with normal relative wall thickness).  5. Severe global left ventricularsystolic dysfunction. Endocardial visualization enhanced with intravenous  injection of Ultrasonic Enhancing Agent (Definity). No LV thrombus.  Septal flattening consistent with right ventricular overload.  6. Severe diastolic dysfunction with elevated filling pressures  7. Severe right atrial enlargement.  8. Right ventricular enlargement with decreased right ventricular systolic function.  A device wire is noted in the right heart.  9. Dilated tricuspid annulus with malcoaptation of tricuspid leaflets. Severe tricuspid regurgitation.

## 2019-03-20 NOTE — CONSULT NOTE ADULT - SUBJECTIVE AND OBJECTIVE BOX
HPI:    63 year old female PMH severe cardiomyopathy c/b CHF w EF 10% s/p AICD on home milrinone infusion via PICC, afib on aspirin & elliquis, DM2, HTN, thyroid cancer s/p thyroidectomy who presented for mechanical fall. Pt brought to ED for mechanical fall down 14 steps, denied loss of consciousness. Pt reports headache & back pain since the fall. She was able to ambulate after falling. On admission she complained of pain in her head and her left shin.    In the ED febrile to 101.8, tachycardic to > 130, hypotensive to SBP in the 80's. WBC on presentation of 20.8 with 83% PMN. Transaminitis with Alk Phos of 451, , ALT of 43 and T Bili of 4.7. Lactic acid of 2.5. U/A with negative leukocyte esterase and negative nitries. RVP negative. CT Chest/Abdomen/Pelvis with no obvious intraabdominal pathology and chest with ground glass opacities in the right middle and right lower lobes (present on 18). Started on Vancomycin / Azithromycin / Aztreonam / Flagyl. Started on Levophed. Blood cultures resulted with Klebsiella pneumoniae.       PAST MEDICAL & SURGICAL HISTORY:  Asthma  CHF (congestive heart failure): with EF of 10% s/p AICD  DM (diabetes mellitus)  HTN (hypertension)  Thyroid ca  AICD (automatic cardioverter/defibrillator) present  S/P thyroidectomy      Allergies  Isordil (Headache)  penicillin (Rash)        ANTIMICROBIALS:  aztreonam  IVPB 1000 every 8 hours  vancomycin  IVPB 1000 every 12 hours      OTHER MEDS: MEDICATIONS  (STANDING):  dextrose 40% Gel 15 once PRN  dextrose 50% Injectable 12.5 once  dextrose 50% Injectable 25 once  dextrose 50% Injectable 25 once  glucagon  Injectable 1 once PRN  insulin lispro (HumaLOG) corrective regimen sliding scale  every 6 hours  milrinone Infusion 0.5 <Continuous>  norepinephrine Infusion 0.05 <Continuous>      SOCIAL HISTORY:  [ ] etoh [ ] tobacco [ ] former smoker [ ] IVDU    FAMILY HISTORY:  No pertinent family history in first degree relatives      REVIEW OF SYSTEMS  [  ] ROS unobtainable because:    [  ] All other systems negative except as noted below:	    Constitutional:  [ ] fever [ ] chills  [ ] weight loss  [ ] weakness  Skin:  [ ] rash [ ] phlebitis	  Eyes: [ ] icterus [ ] pain  [ ] discharge	  ENMT: [ ] sore throat  [ ] thrush [ ] ulcers [ ] exudates  Respiratory: [ ] dyspnea [ ] hemoptysis [ ] cough [ ] sputum	  Cardiovascular:  [ ] chest pain [ ] palpitations [ ] edema	  Gastrointestinal:  [ ] nausea [ ] vomiting [ ] diarrhea [ ] constipation [ ] pain	  Genitourinary:  [ ] dysuria [ ] frequency [ ] hematuria [ ] discharge [ ] flank pain  [ ] incontinence  Musculoskeletal:  [ ] myalgias [ ] arthralgias [ ] arthritis  [ ] back pain  Neurological:  [ ] headache [ ] seizures  [ ] confusion/altered mental status  Psychiatric:  [ ] anxiety [ ] depression	  Hematology/Lymphatics:  [ ] lymphadenopathy  Endocrine:  [ ] adrenal [ ] thyroid  Allergic/Immunologic:	 [ ] transplant [ ] seasonal    Vital Signs Last 24 Hrs  T(F): 99 (19 @ 09:30), Max: 101.8 (19 @ 05:50)    Vital Signs Last 24 Hrs  HR: 97 (19 @ 15:15) (86 - 133)  BP: 117/67 (19 @ 15:15) (69/48 - 131/105)  RR: 38 (19 @ 15:15)  SpO2: 98% (19 @ 15:15) (94% - 100%)  Wt(kg): --    PHYSICAL EXAM:  General: non-toxic  HEAD/EYES: anicteric, PERRL  ENT:  supple  Cardiovascular:   S1, S2  Respiratory:  clear bilaterally  GI:  soft, non-tender, normal bowel sounds  :  no CVA tenderness   Musculoskeletal:  no synovitis  Neurologic:  grossly non-focal  Skin:  no rash  Lymph: no lymphadenopathy  Psychiatric:  appropriate affect  Vascular:  no phlebitis                                10.3   61.0  )-----------( 221      ( 20 Mar 2019 16:09 )             32.4       03-20    129<L>  |  85<L>  |  27<H>  ----------------------------<  128<H>  3.7   |  24  |  1.27    Ca    7.9<L>      20 Mar 2019 08:41  Phos  3.5     -20  Mg     1.2     -    TPro  7.1  /  Alb  2.7<L>  /  TBili  6.1<H>  /  DBili  x   /  AST  58<H>  /  ALT  36  /  AlkPhos  525<H>        Urinalysis Basic - ( 19 Mar 2019 20:28 )    Color: Light Yellow / Appearance: Clear / S.008 / pH: x  Gluc: x / Ketone: Negative  / Bili: Negative / Urobili: Negative   Blood: x / Protein: Negative / Nitrite: Negative   Leuk Esterase: Negative / RBC: x / WBC x   Sq Epi: x / Non Sq Epi: x / Bacteria: x        MICROBIOLOGY:  .Blood Blood-Peripheral 1 AEROBIC BOTTLE REC'D  19   Growth in aerobic bottle: Gram Negative Rods  "Due to technical problems, Proteus sp. will Not be reported as part of  the BCID panel until further notice"  ***Blood Panel PCR results on this specimen are available  approximately 3 hours after the Gram stain result.***  Gram stain, PCR, and/or culture results may not always  correspond due to difference in methodologies.  ************************************************************  This PCR assay was performed using [employer].  The following targets are tested for: Enterococcus,  vancomycin resistant enterococci, Listeria monocytogenes,  coagulase negative staphylococci, S. aureus,  methicillin resistant S. aureus, Streptococcus agalactiae  (Group B), S. pneumoniae, S. pyogenes (Group A),  Acinetobacter baumannii, Enterobacter cloacae, E. coli,  Klebsiella oxytoca, K. pneumoniae, Proteus sp.,  Serratia marcescens, Haemophilus influenzae,  Neisseria meningitidis, Pseudomonas aeruginosa, Candida  albicans, C. glabrata, C krusei, C parapsilosis,  C. tropicalis and the KPC resistance gene.  --  Blood Culture PCR      .Blood Blood-Peripheral  19   No growth at 5 days.  --  --      .Blood Blood-Peripheral  19   No growth at 5 days.  --  --              v    Rapid RVP Result: NotDetec ( @ 01:04)          RADIOLOGY: HPI:    63 year old female PMH severe cardiomyopathy c/b CHF w EF 10% s/p AICD on home milrinone infusion via PICC, afib on aspirin & elliquis, DM2, HTN, thyroid cancer s/p thyroidectomy who presented for mechanical fall. Pt brought to ED for mechanical fall down 14 steps, denied loss of consciousness. Pt reports headache & back pain since the fall. She was able to ambulate after falling. On admission she complained of pain in her head and her left shin.    In the ED febrile to 101.8, tachycardic to > 130, hypotensive to SBP in the 80's. WBC on presentation of 20.8 with 83% PMN. Transaminitis with Alk Phos of 451, , ALT of 43 and T Bili of 4.7. Lactic acid of 2.5. U/A with negative leukocyte esterase and negative nitries. RVP negative. CT Chest/Abdomen/Pelvis with no obvious intraabdominal pathology and chest with ground glass opacities in the right middle and right lower lobes (present on 18). Started on Vancomycin / Azithromycin / Aztreonam / Flagyl. Started on Levophed. Blood cultures resulted with Klebsiella pneumoniae.       PAST MEDICAL & SURGICAL HISTORY:  Asthma  CHF (congestive heart failure): with EF of 10% s/p AICD  DM (diabetes mellitus)  HTN (hypertension)  Thyroid ca  AICD (automatic cardioverter/defibrillator) present  S/P thyroidectomy      Allergies  Isordil (Headache)  penicillin (Rash)        ANTIMICROBIALS:  aztreonam  IVPB 1000 every 8 hours  vancomycin  IVPB 1000 every 12 hours      OTHER MEDS: MEDICATIONS  (STANDING):  dextrose 40% Gel 15 once PRN  dextrose 50% Injectable 12.5 once  dextrose 50% Injectable 25 once  dextrose 50% Injectable 25 once  glucagon  Injectable 1 once PRN  insulin lispro (HumaLOG) corrective regimen sliding scale  every 6 hours  milrinone Infusion 0.5 <Continuous>  norepinephrine Infusion 0.05 <Continuous>      SOCIAL HISTORY:  [ ] etoh [ ] tobacco [ ] former smoker [ ] IVDU    FAMILY HISTORY:  No pertinent family history in first degree relatives      REVIEW OF SYSTEMS  [  ] ROS unobtainable because:    [  ] All other systems negative except as noted below:	    Constitutional:  [ ] fever [ ] chills  [ ] weight loss  [ ] weakness  Skin:  [ ] rash [ ] phlebitis	  Eyes: [ ] icterus [ ] pain  [ ] discharge	  ENMT: [ ] sore throat  [ ] thrush [ ] ulcers [ ] exudates  Respiratory: [ ] dyspnea [ ] hemoptysis [ ] cough [ ] sputum	  Cardiovascular:  [ ] chest pain [ ] palpitations [ ] edema	  Gastrointestinal:  [ ] nausea [ ] vomiting [ ] diarrhea [ ] constipation [ ] pain	  Genitourinary:  [ ] dysuria [ ] frequency [ ] hematuria [ ] discharge [ ] flank pain  [ ] incontinence  Musculoskeletal:  [ ] myalgias [ ] arthralgias [ ] arthritis  [ ] back pain  Neurological:  [ ] headache [ ] seizures  [ ] confusion/altered mental status  Psychiatric:  [ ] anxiety [ ] depression	  Hematology/Lymphatics:  [ ] lymphadenopathy  Endocrine:  [ ] adrenal [ ] thyroid  Allergic/Immunologic:	 [ ] transplant [ ] seasonal    Vital Signs Last 24 Hrs  T(F): 99 (19 @ 09:30), Max: 101.8 (19 @ 05:50)    Vital Signs Last 24 Hrs  HR: 97 (19 @ 15:15) (86 - 133)  BP: 117/67 (19 @ 15:15) (69/48 - 131/105)  RR: 38 (19 @ 15:15)  SpO2: 98% (19 @ 15:15) (94% - 100%)  Wt(kg): --    PHYSICAL EXAM:  General: non-toxic  HEAD/EYES: anicteric, PERRL  ENT:  supple  Cardiovascular:   S1, S2  Respiratory:  clear bilaterally  GI:  soft, non-tender, normal bowel sounds  :  no CVA tenderness   Musculoskeletal:  no synovitis  Neurologic:  grossly non-focal  Skin:  no rash  Lymph: no lymphadenopathy  Psychiatric:  appropriate affect  Vascular:  no phlebitis                                10.3   61.0  )-----------( 221      ( 20 Mar 2019 16:09 )             32.4       03-20    129<L>  |  85<L>  |  27<H>  ----------------------------<  128<H>  3.7   |  24  |  1.27    Ca    7.9<L>      20 Mar 2019 08:41  Phos  3.5     -20  Mg     1.2     -    TPro  7.1  /  Alb  2.7<L>  /  TBili  6.1<H>  /  DBili  x   /  AST  58<H>  /  ALT  36  /  AlkPhos  525<H>        Urinalysis Basic - ( 19 Mar 2019 20:28 )    Color: Light Yellow / Appearance: Clear / S.008 / pH: x  Gluc: x / Ketone: Negative  / Bili: Negative / Urobili: Negative   Blood: x / Protein: Negative / Nitrite: Negative   Leuk Esterase: Negative / RBC: x / WBC x   Sq Epi: x / Non Sq Epi: x / Bacteria: x        MICROBIOLOGY:  .Blood Blood-Peripheral 1 AEROBIC BOTTLE REC'D  19   Growth in aerobic bottle: Gram Negative Rods  "Due to technical problems, Proteus sp. will Not be reported as part of  the BCID panel until further notice"  ***Blood Panel PCR results on this specimen are available  approximately 3 hours after the Gram stain result.***  Gram stain, PCR, and/or culture results may not always  correspond due to difference in methodologies.  ************************************************************  This PCR assay was performed using [employer].  The following targets are tested for: Enterococcus,  vancomycin resistant enterococci, Listeria monocytogenes,  coagulase negative staphylococci, S. aureus,  methicillin resistant S. aureus, Streptococcus agalactiae  (Group B), S. pneumoniae, S. pyogenes (Group A),  Acinetobacter baumannii, Enterobacter cloacae, E. coli,  Klebsiella oxytoca, K. pneumoniae, Proteus sp.,  Serratia marcescens, Haemophilus influenzae,  Neisseria meningitidis, Pseudomonas aeruginosa, Candida  albicans, C. glabrata, C krusei, C parapsilosis,  C. tropicalis and the KPC resistance gene.  --  Blood Culture PCR      .Blood Blood-Peripheral  19   No growth at 5 days.  --  --      .Blood Blood-Peripheral  19   No growth at 5 days.  --  --    Rapid RVP Result: [employer] ( @ 01:04)    RADIOLOGY:    EXAM:  CT REFORM SPINE T                        EXAM:  CT ABDOMEN AND PELVIS IC                        EXAM:  CT CHEST IC                        PROCEDURE DATE:  2019    No obvious acute intrathoracic, intra-abdominal or pelvic solid oval, visceral or vascular injury, given the extent of artifact. No obvious acute fracture or traumatic malalignment.  Groundglass opacities in the right middle and right lower lobes, which were noted on 2018. Differential diagnosis includes pulmonary edema, infection and inflammation although neoplasm cannot be excluded.   Recommend follow-up.  Additional findings as described.    EXAM:  CT BRAIN                        PROCEDURE DATE:  2019    Stable right-sided subdural hematoma.    EXAM:  CT BRAIN                        EXAM:  CT CERVICAL SPINE                        PROCEDURE DATE:  2019    Head CT: Small acute hematoma along the right convexity.  Cervical spine CT: No fracture or traumatic malalignment in the cervical spine. If there is clinical suspicion for acute fracture or ligamentous/cord injury, MRI may be obtained for further evaluation.    EXAM:  ER TTE LIMITED    PROCEDURE DATE:  2019    No Pericardial Effusion HPI:    63 year old female PMH severe cardiomyopathy c/b CHF w EF 10% s/p AICD on home milrinone infusion via PICC, afib on aspirin & Eliquis, DM2, HTN, thyroid cancer s/p thyroidectomy who presented for mechanical fall on 3/19/19. Patient was brought to ED for mechanical fall down 14 steps, denied loss of consciousness. Patient reports headache & back pain since the fall. She was able to ambulate after falling. She notes chronic cough since November which is intermittently productive of pink colored sputum. Notes fevers this past  and the Monday prior. Notes chills that coincided with Milrinone drip changes. Denies direct pain at PICC line site. Denies drainage from PICC line site. Denies dysuria or urinary frequency. Denies N/V/D or abdominal pain.     In the ED febrile to 101.8, tachycardic to > 130, hypotensive to SBP in the 80's. WBC on presentation of 20.8 with 83% PMN. Transaminitis with Alk Phos of 451, , ALT of 43 and T Bili of 4.7. Lactic acid of 2.5. U/A with negative leukocyte esterase and negative nitrites. RVP negative. CT Chest/Abdomen/Pelvis with no obvious intraabdominal pathology and chest with ground glass opacities in the right middle and right lower lobes (present on 18). Started on Vancomycin / Azithromycin / Aztreonam / Flagyl. Started on Levophed. Blood cultures resulted with Klebsiella pneumoniae.     PAST MEDICAL & SURGICAL HISTORY:  Asthma  CHF (congestive heart failure): with EF of 10% s/p AICD  DM (diabetes mellitus)  HTN (hypertension)  Thyroid ca  AICD (automatic cardioverter/defibrillator) present  S/P thyroidectomy      Allergies  Isordil (Headache)  penicillin (Rash)        ANTIMICROBIALS:  aztreonam  IVPB 1000 every 8 hours  vancomycin  IVPB 1000 every 12 hours      OTHER MEDS: MEDICATIONS  (STANDING):  dextrose 40% Gel 15 once PRN  dextrose 50% Injectable 12.5 once  dextrose 50% Injectable 25 once  dextrose 50% Injectable 25 once  glucagon  Injectable 1 once PRN  insulin lispro (HumaLOG) corrective regimen sliding scale  every 6 hours  milrinone Infusion 0.5 <Continuous>  norepinephrine Infusion 0.05 <Continuous>      SOCIAL HISTORY:  No prior smoking history or EtOH use. No recreational drug use. No recent travel. Born in [facility]. Worked as a  in [place]. Currently retired.     FAMILY HISTORY:  Father passed away when patient was age 10 from poisoning - unsure of his medical history  Mother is healthy at age 87    REVIEW OF SYSTEMS  [  ] ROS unobtainable because:    [ x ] All other systems negative except as noted below:	    Constitutional:  [x ] fever [ x] chills  [ ] weight loss  [ ] weakness  Skin:  [ ] rash [ ] phlebitis	  Eyes: [ ] icterus [ ] pain  [ ] discharge	  ENMT: [ ] sore throat  [ ] thrush [ ] ulcers [ ] exudates  Respiratory: [ ] dyspnea [ ] hemoptysis [ x] cough [ ] sputum	  Cardiovascular:  [ ] chest pain [ ] palpitations [ ] edema	  Gastrointestinal:  [ ] nausea [ ] vomiting [ ] diarrhea [ ] constipation [ ] pain	  Genitourinary:  [ ] dysuria [ ] frequency [ ] hematuria [ ] discharge [ ] flank pain  [ ] incontinence  Musculoskeletal:  [ ] myalgias [ ] arthralgias [ ] arthritis  [ ] back pain  Neurological:  [ ] headache [ ] seizures  [ ] confusion/altered mental status  Psychiatric:  [ ] anxiety [ ] depression	  Hematology/Lymphatics:  [ ] lymphadenopathy  Endocrine:  [ ] adrenal [ ] thyroid  Allergic/Immunologic:	 [ ] transplant [ ] seasonal    Vital Signs Last 24 Hrs  T(F): 99 (19 @ 09:30), Max: 101.8 (19 @ 05:50)    Vital Signs Last 24 Hrs  HR: 97 (19 @ 15:15) (86 - 133)  BP: 117/67 (19 @ 15:15) (69/48 - 131/105)  RR: 38 (19 @ 15:15)  SpO2: 98% (19 @ 15:15) (94% - 100%)  Wt(kg): --    PHYSICAL EXAMINATION:  General: Alert and Awake, NAD  HEENT: PERRL, EOMI, +Scleral Icterus, Oropharynx Clear, MMM  Neck: Supple, No FAREED, +RIJ  Cardiac: RRR, No M/R/G  Chest: L AICD (No surrounding erythema, drainage or tenderness to palpation)  Resp: CTAB, No Wh/Rh/Ra  Abdomen: NBS, NT/ND, No HSM, No rigidity or guarding  MSK: No LE edema. No stigmata of IE. No evidence of phlebitis. No evidence of synovitis.  Back: No CVA Tenderness  : No arteaga  Vascular: RUE PICC Line (No surrounding erythema, drainage or tenderness to palpation)  Skin: No rashes or lesions. Skin is warm and dry to the touch.   Neuro: Alert and Awake. CN 2-12 Grossly intact. Moves all four extremities spontaneously.  Psych: Calm, Pleasant, Cooperative                          10.3   61.0  )-----------( 221      ( 20 Mar 2019 16:09 )             32.4       03-20    129<L>  |  85<L>  |  27<H>  ----------------------------<  128<H>  3.7   |  24  |  1.27    Ca    7.9<L>      20 Mar 2019 08:41  Phos  3.5       Mg     1.2         TPro  7.1  /  Alb  2.7<L>  /  TBili  6.1<H>  /  DBili  x   /  AST  58<H>  /  ALT  36  /  AlkPhos  525<H>        Urinalysis Basic - ( 19 Mar 2019 20:28 )    Color: Light Yellow / Appearance: Clear / S.008 / pH: x  Gluc: x / Ketone: Negative  / Bili: Negative / Urobili: Negative   Blood: x / Protein: Negative / Nitrite: Negative   Leuk Esterase: Negative / RBC: x / WBC x   Sq Epi: x / Non Sq Epi: x / Bacteria: x        MICROBIOLOGY:  .Blood Blood-Peripheral 1 AEROBIC BOTTLE REC'D  19   Growth in aerobic bottle: Gram Negative Rods  "Due to technical problems, Proteus sp. will Not be reported as part of  the BCID panel until further notice"  ***Blood Panel PCR results on this specimen are available  approximately 3 hours after the Gram stain result.***  Gram stain, PCR, and/or culture results may not always  correspond due to difference in methodologies.  ************************************************************  This PCR assay was performed using [employer].  The following targets are tested for: Enterococcus,  vancomycin resistant enterococci, Listeria monocytogenes,  coagulase negative staphylococci, S. aureus,  methicillin resistant S. aureus, Streptococcus agalactiae  (Group B), S. pneumoniae, S. pyogenes (Group A),  Acinetobacter baumannii, Enterobacter cloacae, E. coli,  Klebsiella oxytoca, K. pneumoniae, Proteus sp.,  Serratia marcescens, Haemophilus influenzae,  Neisseria meningitidis, Pseudomonas aeruginosa, Candida  albicans, C. glabrata, C krusei, C parapsilosis,  C. tropicalis and the KPC resistance gene.  --  Blood Culture PCR      .Blood Blood-Peripheral  19   No growth at 5 days.  --  --      .Blood Blood-Peripheral  19   No growth at 5 days.  --  --    Rapid RVP Result: NotDetec ( @ 01:04)    RADIOLOGY:    EXAM:  CT REFORM SPINE T                        EXAM:  CT ABDOMEN AND PELVIS IC                        EXAM:  CT CHEST IC                        PROCEDURE DATE:  2019    No obvious acute intrathoracic, intra-abdominal or pelvic solid oval, visceral or vascular injury, given the extent of artifact. No obvious acute fracture or traumatic malalignment.  Groundglass opacities in the right middle and right lower lobes, which were noted on 2018. Differential diagnosis includes pulmonary edema, infection and inflammation although neoplasm cannot be excluded.   Recommend follow-up.  Additional findings as described.    EXAM:  CT BRAIN                        PROCEDURE DATE:  2019    Stable right-sided subdural hematoma.    EXAM:  CT BRAIN                        EXAM:  CT CERVICAL SPINE                        PROCEDURE DATE:  2019    Head CT: Small acute hematoma along the right convexity.  Cervical spine CT: No fracture or traumatic malalignment in the cervical spine. If there is clinical suspicion for acute fracture or ligamentous/cord injury, MRI may be obtained for further evaluation.    EXAM:  ER TTE LIMITED    PROCEDURE DATE:  2019    No Pericardial Effusion HPI:    63 year old female PMH severe cardiomyopathy c/b CHF w EF 10% s/p AICD on home milrinone infusion via PICC, afib on aspirin & Eliquis, DM2, HTN, thyroid cancer s/p thyroidectomy who presented for mechanical fall on 3/19/19. Patient was brought to ED for mechanical fall down 14 steps, denied loss of consciousness. Patient reports headache & back pain since the fall. She was able to ambulate after falling. She notes chronic cough since November which is intermittently productive of pink colored sputum. Notes fevers this past  and the Monday prior. Notes chills that coincided with Milrinone drip changes. Denies direct pain at PICC line site. Denies drainage from PICC line site. Denies dysuria or urinary frequency. Denies N/V/D or abdominal pain.     In the ED febrile to 101.8, tachycardic to > 130, hypotensive to SBP in the 80's. WBC on presentation of 20.8 with 83% PMN. Transaminitis with Alk Phos of 451, , ALT of 43 and T Bili of 4.7. Lactic acid of 2.5. U/A with negative leukocyte esterase and negative nitrites. RVP negative. CT Chest/Abdomen/Pelvis with no obvious intraabdominal pathology and chest with ground glass opacities in the right middle and right lower lobes (present on 18). Started on Vancomycin / Azithromycin / Aztreonam / Flagyl. Started on Levophed. Blood cultures resulted with Klebsiella pneumoniae.     PAST MEDICAL & SURGICAL HISTORY:  Asthma  CHF (congestive heart failure): with EF of 10% s/p AICD  DM (diabetes mellitus)  HTN (hypertension)  Thyroid ca  AICD (automatic cardioverter/defibrillator) present  S/P thyroidectomy      Allergies  Isordil (Headache)  penicillin (Rash)        ANTIMICROBIALS:  aztreonam  IVPB 1000 every 8 hours  vancomycin  IVPB 1000 every 12 hours      OTHER MEDS: MEDICATIONS  (STANDING):  dextrose 40% Gel 15 once PRN  dextrose 50% Injectable 12.5 once  dextrose 50% Injectable 25 once  dextrose 50% Injectable 25 once  glucagon  Injectable 1 once PRN  insulin lispro (HumaLOG) corrective regimen sliding scale  every 6 hours  milrinone Infusion 0.5 <Continuous>  norepinephrine Infusion 0.05 <Continuous>      SOCIAL HISTORY:  No prior smoking history or EtOH use. No recreational drug use. No recent travel. Born in [facility]. Worked as a  in [place]. Currently retired.     FAMILY HISTORY:  Father passed away when patient was age 10- Patient suspects it was  from poisoning - unsure of his medical history  Mother is healthy at age 87    REVIEW OF SYSTEMS  [  ] ROS unobtainable because:    [ x ] All other systems negative except as noted below:	    Constitutional:  [x ] fever [ x] chills  [ ] weight loss  [ ] weakness  Skin:  [ ] rash [ ] phlebitis	  Eyes: [ ] icterus [ ] pain  [ ] discharge	  ENMT: [ ] sore throat  [ ] thrush [ ] ulcers [ ] exudates  Respiratory: [ ] dyspnea [ ] hemoptysis [ x] cough [ ] sputum	  Cardiovascular:  [ ] chest pain [ ] palpitations [ ] edema	  Gastrointestinal:  [ ] nausea [ ] vomiting [ ] diarrhea [ ] constipation [ ] pain	  Genitourinary:  [ ] dysuria [ ] frequency [ ] hematuria [ ] discharge [ ] flank pain  [ ] incontinence  Musculoskeletal:  [ ] myalgias [ ] arthralgias [ ] arthritis  [ ] back pain  Neurological:  [ ] headache [ ] seizures  [ ] confusion/altered mental status  Psychiatric:  [ ] anxiety [ ] depression	  Hematology/Lymphatics:  [ ] lymphadenopathy  Endocrine:  [ ] adrenal [ ] thyroid  Allergic/Immunologic:	 [ ] transplant [ ] seasonal    Vital Signs Last 24 Hrs  T(F): 99 (19 @ 09:30), Max: 101.8 (19 @ 05:50)    Vital Signs Last 24 Hrs  HR: 97 (19 @ 15:15) (86 - 133)  BP: 117/67 (19 @ 15:15) (69/48 - 131/105)  RR: 38 (19 @ 15:15)  SpO2: 98% (19 @ 15:15) (94% - 100%)  Wt(kg): --    PHYSICAL EXAMINATION:  General: Alert and Awake, NAD  HEENT: PERRL, EOMI, +Scleral Icterus, Oropharynx Clear, MMM  Neck: Supple, No FAREED, +RIJ  Cardiac: RRR, No M/R/G  Chest: L AICD (No surrounding erythema, drainage or tenderness to palpation)  Resp: CTAB, No Wh/Rh/Ra  Abdomen: NBS, NT/ND, No HSM, No rigidity or guarding  MSK: No LE edema. No stigmata of IE. No evidence of phlebitis. No evidence of synovitis.  Back: No CVA Tenderness  : No arteaga  Vascular: RUE PICC Line (No surrounding erythema, drainage or tenderness to palpation)  Skin: No rashes or lesions. Skin is warm and dry to the touch.   Neuro: Alert and Awake. CN 2-12 Grossly intact. Moves all four extremities spontaneously.  Psych: Calm, Pleasant, Cooperative                          10.3   61.0  )-----------( 221      ( 20 Mar 2019 16:09 )             32.4       03-20    129<L>  |  85<L>  |  27<H>  ----------------------------<  128<H>  3.7   |  24  |  1.27    Ca    7.9<L>      20 Mar 2019 08:41  Phos  3.5       Mg     1.2         TPro  7.1  /  Alb  2.7<L>  /  TBili  6.1<H>  /  DBili  x   /  AST  58<H>  /  ALT  36  /  AlkPhos  525<H>        Urinalysis Basic - ( 19 Mar 2019 20:28 )    Color: Light Yellow / Appearance: Clear / S.008 / pH: x  Gluc: x / Ketone: Negative  / Bili: Negative / Urobili: Negative   Blood: x / Protein: Negative / Nitrite: Negative   Leuk Esterase: Negative / RBC: x / WBC x   Sq Epi: x / Non Sq Epi: x / Bacteria: x        MICROBIOLOGY:  .Blood Blood-Peripheral 1 AEROBIC BOTTLE REC'D  19   Growth in aerobic bottle: Gram Negative Rods  "Due to technical problems, Proteus sp. will Not be reported as part of  the BCID panel until further notice"  ***Blood Panel PCR results on this specimen are available  approximately 3 hours after the Gram stain result.***  Gram stain, PCR, and/or culture results may not always  correspond due to difference in methodologies.  ************************************************************  This PCR assay was performed using [employer].  The following targets are tested for: Enterococcus,  vancomycin resistant enterococci, Listeria monocytogenes,  coagulase negative staphylococci, S. aureus,  methicillin resistant S. aureus, Streptococcus agalactiae  (Group B), S. pneumoniae, S. pyogenes (Group A),  Acinetobacter baumannii, Enterobacter cloacae, E. coli,  Klebsiella oxytoca, K. pneumoniae, Proteus sp.,  Serratia marcescens, Haemophilus influenzae,  Neisseria meningitidis, Pseudomonas aeruginosa, Candida  albicans, C. glabrata, C krusei, C parapsilosis,  C. tropicalis and the KPC resistance gene.  --  Blood Culture PCR      .Blood Blood-Peripheral  19   No growth at 5 days.  --  --      .Blood Blood-Peripheral  19   No growth at 5 days.  --  --    Rapid RVP Result: NotDetec ( @ 01:04)    RADIOLOGY:    EXAM:  CT REFORM SPINE T                        EXAM:  CT ABDOMEN AND PELVIS IC                        EXAM:  CT CHEST IC                        PROCEDURE DATE:  2019    No obvious acute intrathoracic, intra-abdominal or pelvic solid oval, visceral or vascular injury, given the extent of artifact. No obvious acute fracture or traumatic malalignment.  Groundglass opacities in the right middle and right lower lobes, which were noted on 2018. Differential diagnosis includes pulmonary edema, infection and inflammation although neoplasm cannot be excluded.   Recommend follow-up.  Additional findings as described.    EXAM:  CT BRAIN                        PROCEDURE DATE:  2019    Stable right-sided subdural hematoma.    EXAM:  CT BRAIN                        EXAM:  CT CERVICAL SPINE                        PROCEDURE DATE:  2019    Head CT: Small acute hematoma along the right convexity.  Cervical spine CT: No fracture or traumatic malalignment in the cervical spine. If there is clinical suspicion for acute fracture or ligamentous/cord injury, MRI may be obtained for further evaluation.    EXAM:  ER TTE LIMITED    PROCEDURE DATE:  2019    No Pericardial Effusion

## 2019-03-20 NOTE — CONSULT NOTE ADULT - ASSESSMENT
64 yo F h/o severe cardiomyopathy c/b CHF w EF 10% s/p AICD on home milrinone infusion via PICC, afib on aspirin & elliquis, DM2, HTN, thyroid cancer s/p thyroidectomy presents for mechanical fall, found to have SDH, admitted to MICU for multifactorial shock likely cardiogenic and septic shock i/s/o possible PNA.    Mechanical Fall with SDH   - SDH stable on repeat CT head. S/p kaycentra for elevated INR 1.7.  - serial head CT  - frequent neuro checks  - monitor coags, give kaycentra as needed for elevated INR, avoid anticoagulation  - appreciate neurosurgery recs    Severe cardiomyopathy c/b CHF s/p AICD on home milrinone infusion via PICC with Shock ; Multifactorial shock w cardiogenic & septic shock i/s/o PNA. Prolonged QTc 602 ms. Troponin 37.  - amiodarone 150 mg stat  - c/w norepinephrine gtt, titrate to maintain MAP > 65  - c/w milrinone gtt  - TTE    Septic shock w fever 38.8, WBC 20.8, CT chest w ground-glass opacities in RML & RLL consistent w PNA vs pulm edema vs malignancy, RVP, & UA negative for infection. Treating empirically for possible HCAP though low suspicion, will also cover for possible PICC line infection.  - c/w vancomycin 1g q12h & monitor vanc trough  - c/w aztronam 1g q8h  - c/w azithromycin 500 mg qD  - start metronidazole 500 mg q8h --> update: plan to D/C  - send Ulegionella & pro-calcitonin  - Would recommend ID     Hypokalemia & hypomagnesemia. Cr stable. Renal consult noted.   - monitor BMP, replete for K > 4, Mg > 2    Congestive hepatopathy : Sec to CHF .Abd mildly distended, non-tender. Cholelithiasis on prior US. Lipase 140. Ammonia wnl.   - Stable.     DM2  - ISS q6h while NPO  Thyroid cancer s/p thyroidectomy  - send thyroid studies    - DVT ppx: SCDs, hold pharmacological ppx  - tx SDH as above

## 2019-03-20 NOTE — PROCEDURE NOTE - SUPERVISORY STATEMENT
EP ATTENDING    Agree with above. Unremarkable device interrogation. See my separate progress note for EP A/P.

## 2019-03-20 NOTE — PROCEDURE NOTE - NSPROCNAME_GEN_A_CORE
CRT-D (Cardiac Resynchronization Therapy with Defibrillation Capabilities) Interrogation Note
Central Line Insertion

## 2019-03-20 NOTE — CONSULT NOTE ADULT - ATTENDING COMMENTS
Patient seen and examined  Agree with above
Agree with above. No trauma interventions at this time. Tertiary exam.
Madhavi Brown M.D. ,   Pager [phone]     after 5PM/ weekends [phone]

## 2019-03-20 NOTE — PROGRESS NOTE ADULT - ASSESSMENT
ASSESSMENT:   63F w/ extensive cardiac history as noted presenting s/p fall found to have r parietal laceration and stable SDH on repeat CTH, currently in ICU for hemodynamic  monitoring given hypotension yesterday evening     PLAN:   - No new injuries on tertiary exam  - care as per primary team  - F/u with Neurosurgery as outpatient  - Reconsult as needed  - Plan discussed with Dr Roberto Kerr pGY-2  ACS [id number]

## 2019-03-20 NOTE — PROCEDURE NOTE - ADDITIONAL PROCEDURE DETAILS
call to check CRT D for s/p fall  normal sensing and pacing thresholds, stable lead impedence   stored data review reveal ventricular high rate episodes c/w NSVT lasting 1 second on 3/14/19  optivol fluid index below impedence line which indicates no fluid accumulation,m AT AF burden 0%    [id number]

## 2019-03-20 NOTE — PROGRESS NOTE ADULT - SUBJECTIVE AND OBJECTIVE BOX
TERTIARY TRAUMA SURVEY  ------------------------------------------------------------------------------------    Date of TTS: 3/20/19  Time: 10:45  Admit Date: 3/19/19    Trauma Activation: N/A  Admit GCS: 15    HPI:  64 yo F h/o severe cardiomyopathy c/b CHF w EF 10% s/p AICD on home milrinone infusion via PICC, afib on aspirin & elliquis, DM2, HTN, thyroid cancer s/p thyroidectomy presents for mechanical fall. Pt brought to ED for mechanical fall down 14 steps, denied loss of consciousness. Pt reports headache & back pain since the fall. She was able to ambulate after falling. On admission she complained of pain in her head and her left shin.    ED vitals were T 38.8, HR 86 - 133, BP 80/59 - 113/72, RR 15 - 18, SpO2 95 - 100%. CT Head, C-spine, thoracic spine, C/A/P performed demonstrating SDH. Pt was given vanc, azithro, aztreonam, tylenol, kaycentra, and 1 L NS. Seen by trauma surgery & neurosurgery, initially admitted to medicine, then became lethargic & hypotensive, started on levophed gtt and transferred to MICU. (20 Mar 2019 07:22)      INTERVAL EVENTS: R parietal scalp laceration repaired by ED. Stable repaet head CT this afternoon. Denied any pain bedsides pain from facial laceration.     PAST MEDICAL & SURGICAL HISTORY:  Asthma  CHF (congestive heart failure): with EF of 10% s/p AICD  DM (diabetes mellitus)  HTN (hypertension)  Thyroid ca  AICD (automatic cardioverter/defibrillator) present  S/P thyroidectomy    FAMILY HISTORY:  No pertinent family history in first degree relatives      ALLERGIES: Isordil (Headache)  penicillin (Rash)        CURRENT MEDICATIONS  MEDICATIONS (STANDING): azithromycin  IVPB 500 milliGRAM(s) IV Intermittent every 24 hours  aztreonam  IVPB 1000 milliGRAM(s) IV Intermittent every 8 hours  dextrose 5%. 1000 milliLiter(s) IV Continuous <Continuous>  dextrose 50% Injectable 12.5 Gram(s) IV Push once  dextrose 50% Injectable 25 Gram(s) IV Push once  dextrose 50% Injectable 25 Gram(s) IV Push once  insulin lispro (HumaLOG) corrective regimen sliding scale   SubCutaneous every 6 hours  milrinone Infusion 0.5 MICROgram(s)/kG/Min IV Continuous <Continuous>  norepinephrine Infusion 0.05 MICROgram(s)/kG/Min IV Continuous <Continuous>  vancomycin  IVPB 1000 milliGRAM(s) IV Intermittent every 12 hours    MEDICATIONS (PRN):dextrose 40% Gel 15 Gram(s) Oral once PRN Blood Glucose LESS THAN 70 milliGRAM(s)/deciliter  glucagon  Injectable 1 milliGRAM(s) IntraMuscular once PRN Glucose LESS THAN 70 milligrams/deciliter    -----------------------------------------------------------------------------------    VITAL SIGNS:  T(C): 37.2 (19 @ 09:30), Max: 38.8 (19 @ 05:50)  HR: 101 (19 @ 12:15) (86 - 133)  BP: 116/64 (19 @ 12:15) (69/48 - 116/64)  RR: 16 (19 @ 12:15) (10 - 24)  SpO2: 97% (19 @ 12:15) (94% - 100%)  CAPILLARY BLOOD GLUCOSE      POCT Blood Glucose.: 146 mg/dL (20 Mar 2019 10:13)  POCT Blood Glucose.: 162 mg/dL (20 Mar 2019 05:50)    Drug Dosing Weight  Height (cm): 170.18 (20 Mar 2019 07:47)  Weight (kg): 79.9 (20 Mar 2019 07:47)  BMI (kg/m2): 27.6 (20 Mar 2019 07:47)  BSA (m2): 1.92 (20 Mar 2019 07:47)     @ 07:01  -  03-20 @ 12:41  --------------------------------------------------------  IN:    IV PiggyBack: 600 mL    milrinone  Infusion: 10.8 mL    norepinephrine Infusion: 30 mL    norepinephrine Infusion: 96 mL  Total IN: 736.8 mL    OUT:    Indwelling Catheter - Urethral: 25 mL  Total OUT: 25 mL    Total NET: 711.8 mL          PHYSICAL EXAM:    General: NAD, Sitting in bed comfortably  HEENT: R pariteal laceration with staples, CDI.   Neck: Soft, supple  Cardio: RRR, nml S1/S2  Resp: Good effort, on 3 L NC  Thorax: No chest wall tenderness  Breast: No lesions/masses, no drainage  GI/Abd: Soft, NT/ND, no rebound/guarding, no masses palpated  Vascular: Extremities warm, brisk cap refill, B/l radial pulses palpable, b/l DP/PT palpable, no palpable abdominal pulsatile mass  Skin: Intact, no breakdown  Lymphatic/Nodes: No palpable lymphadenopathy  Musculoskeletal: All 4 extremities moving spontaneously, no limitations    LABS:  CBC ( @ 08:44)                              10.9<L>                         14.64<H>  )----------------(  194        --    % Neutrophils, --    % Lymphocytes, ANC: --                                  34.1<L>  CBC ( @ 08:41)                              10.7<L>                         27.0<H>  )----------------(  202        75.0  % Neutrophils, 2.0<L>% Lymphocytes, ANC: 26.1<H>                              34.4<L>    BMP ( @ 08:41)             129<L>  |  85<L>   |  27<H> 		Ca++ --      Ca 7.9<L>             ---------------------------------( 128<H>		Mg 1.2<L>             3.7     |  24      |  1.27  			Ph 3.5     BMP ( @ 06:03)             130<L>  |  84<L>   |  24<H> 		Ca++ --      Ca 8.4                ---------------------------------( 158<H>		Mg 1.2<L>             3.1<L>  |  27      |  1.07  			Ph --        LFTs ( @ 08:41)      TPro 7.1 / Alb 2.7<L> / TBili 6.1<H> / DBili -- / AST 58<H> / ALT 36 / AlkPhos 525<H>  LFTs ( @ 06:03)      TPro 7.7 / Alb 3.3 / TBili 6.7<H> / DBili -- / AST 52<H> / ALT 39 / AlkPhos 645<H>    Coags ( @ 08:41)  aPTT 25.9<L> / INR 1.76<H> / PT 20.4<H>  Coags ( @ 06:04)  aPTT 31.8 / INR 1.72<H> / PT 19.9<H>    Cardiac Markers ( @ 08:41)     HSTrop: -- / CKMB: -- / CK: 90    ABG ( @ 08:16)     7.53<H> / 35 / 80 / 29 / 6.1<H> / 97<H>%     Lactate:    ABG ( @ 00:10)      /  /  /  /  / %     Lactate:  1.5    VBG ( @ 06:03)     7.41 / 50 / 24<L> / 31<H> / 6.1<H> / 34<L>%     Lactate: 4.0<HH>  VBG ( @ 20:36)     7.46<H> / 54<H> / 34 / 37<H> / 11.9<H> / 63<L>%     Lactate: 2.5<H>      MICROBIOLOGY:  Urinalysis ( @ 20:28):     Color: Light Yellow / Appearance: Clear / S.008<L> / pH: 7.5 / Gluc: Negative / Ketones: Negative / Bili: Negative / Urobili: Negative / Protein :Negative / Nitrites: Negative / Leuk.Est: Negative / RBC:  / WBC:  / Sq Epi:  / Non Sq Epi:  / Bacteria        -> .Blood Blood-Peripheral 1 AEROBIC BOTTLE REC'D Culture ( @ 06:14)       Growth in aerobic bottle: Gram Negative Rods    NG    Growth in aerobic bottle: Gram Negative Rods  "Due to technical problems, Proteus sp. will Not be reported as part of  the BCID panel until further notice"  ***Blood Panel PCR results on this specimen are available  approximately 3 hours after the Gram stain result.***  Gram stain, PCR, and/or culture results may not always  correspond due to difference in methodologies.  ************************************************************  This PCR assay was performed using [employer].  The following targets are tested for: Enterococcus,  vancomycin resistant enterococci, Listeria monocytogenes,  coagulase negative staphylococci, S. aureus,  methicillin resistant S. aureus, Streptococcus agalactiae  (Group B), S. pneumoniae, S. pyogenes (Group A),  Acinetobacter baumannii, Enterobacter cloacae, E. coli,  Klebsiella oxytoca, K. pneumoniae, Proteus sp.,  Serratia marcescens, Haemophilus influenzae,  Neisseria meningitidis, Pseudomonas aeruginosa, Candida  albicans, C. glabrata, C krusei, C parapsilosis,  C. tropicalis and the KPC resistance gene.        ------------------------------------------------------------------------------------------  RADIOLOGICAL FINDINGS REVIEW:    < from: CT Head No Cont (19 @ 05:34) >  FINDINGS:     Unchanged hyperdense right subdural collection measuring 4 mm in maximum   transverse dimension with effacement of adjacent sulci. No new   hemorrhage, hydrocephalus or midline shift.    Mild age-related resolution of intraventricular chronic microvascular   white matter ischemic changes. The basal cisterns are patent.    Small parietal extracalvarial soft tissue swelling with overlying   surgical staples. The calvarium is intact.    Paranasal sinuses and mastoid air cells are free of acute disease.    IMPRESSION:     Stable right-sided subdural hematoma.    < end of copied text >  < from: CT Abdomen and Pelvis w/ IV Cont (19 @ 22:06) >  FINDINGS: Artifact from the patient's arms and AICD/pacemaker degrades   images.    CHEST:     LUNGS AND LARGE AIRWAYS: Patent central airways. Persistent groundglass   opacities in the right middle and right lower lobes. Subsegmental   bibasilar atelectasis.  PLEURA: No pleural effusion or pneumatosis.  VESSELS: No contrast extravasation or perivascular hematoma.  HEART: Stable cardiomegaly. No pericardial effusion. Left chest wall AICD   pacemaker.  MEDIASTINUM AND FLORINDA: No lymphadenopathy.  CHEST WALL AND LOWER NECK: Left subclavian approach AICD pacemaker.    ABDOMEN AND PELVIS:    LIVER: Within normal limits.  BILE DUCTS: Normal caliber.  GALLBLADDER: Cholelithiasis.  SPLEEN: Within normal limits.  PANCREAS: Within normal limits.    ADRENALS: Within normal limits.  KIDNEYS/URETERS: No hydronephrosis, hydroureter or significant   perinephric stranding.  BLADDER: Within normal limits.  REPRODUCTIVE ORGANS: Postsurgical changes in the uterus. No adnexal mass.    BOWEL: No bowel obstruction. Unremarkable appendix. Colon diverticulosis.   No significant bowel wall thickening or inflammatory change.  PERITONEUM: No drainable fluid collection or free air.  VESSELS: Atherosclerotic change of the abdominal aorta and its branches.   No contrast extravasation or perivascular hematoma.  RETROPERITONEUM: No lymphadenopathy.      ABDOMINAL WALL: Small fat-containing umbilical hernia.    BONES/THORACIC SPINE: Transitional lumbosacral anatomy. Degenerative   changes of the spine. No obvious acute fracture or traumatic malalignment.    IMPRESSION:     No obvious acute intrathoracic, intra-abdominal or pelvic solid oval,   visceral or vascular injury, given the extent of artifact. No obvious   acute fracture or traumatic malalignment.    Groundglass opacities in the right middle and right lower lobes, which   were noted on 2018. Differential diagnosis includes pulmonary edema,   infection and inflammation although neoplasm cannot be excluded.   Recommend follow-up.    < end of copied text >    List Injuries Identified to Date:  Right subdural hematoma. R parietal laceration.       List Operative and Interventional Radiological Procedures: N/A      Consults (Date):  [X] Neurosurgery 3/19  [x] Cardiology 3/19

## 2019-03-20 NOTE — CONSULT NOTE ADULT - ASSESSMENT
62 yo woman with a hx of severe CHF (EF 10%) on a milrinone drip via PICC at home, DM, HTN, thyroid CA presents after a mechanical fall down 14 steps. CT Head, C-spine, thoracic spine, C/A/P performed demonstrating SDH. Patient GCS 15, neuro-intact without deficits. Imaging otherwise negative for acute traumatic injuries.    - no indication for trauma surgical admission  - appreciate neurosurgical evaluation and recommendations  - internal medicine evaluation in setting of multiple co-morbidities, severe CHF  - trauma surgery will follow w/ tertiary exam, [id number]    Patient discussed with Dr. Pickett

## 2019-03-20 NOTE — CHART NOTE - NSCHARTNOTEFT_GEN_A_CORE
ACTS STUDY ENROLLMENT NOTE    I spoke with the patient, Pearl Carson, with Roxanne Farmer and Paty Reeves regarding her eligibility for the Ascorbic Acid, Corticosteroids and Thiamine in Sepsis and Septic Shock study.  The patient was able to consent for herself.  I spoke with the patient's  Juliana Carson as well.  I explained the risks and benefits as well as the study design specifically pertaining to the chance of receiving a placebo.  Patient has no exclusion criteria at this time.  All questions presented by family were answered to their satisfaction.  The patient signed the consent and Dr. Rome Diamond signed the physician's consent      Consent was placed in the medical chart, a copy was provided to pharmacy and the patient.  Pharmacy was informed of the enrollment and the study blood draws were initiated.      Kasey Titus MD  Pulmonary Critical Care Fellow  [phone]

## 2019-03-20 NOTE — H&P ADULT - ASSESSMENT
62 yo F h/o severe cardiomyopathy c/b CHF on home milrinone infusion via PICC, DM2, HTN, thyroid cancer presents for mechanical fall, found to have SDH & septic shock likely 2/2 PNA.    #Neuro  SDH s/p mechanical fall with head trauma, lethargic but A&Ox3 w/o focal deficits, likely also component of acute metabolic encephalopathy.  - frequent neuro checks  - appreciate neurosurgery recs    #CV  Hypotension likely 2/2 septic shock i/s/o PNA vs cardiogenic shock. Concern for possible malfunction of pt's milrinone infusion. Course c/b Vtach  - amniodarone stat  - c/w norepinephrine gtt, titrate to maintain MAP > 65  - c/w home milrinone gtt  - cardiology consult  - EP consult    #ID  Septic shock w fever 38.8, WBC 20.8, CT chest w groundglass opacities in RML & RLL consistent w PNA vs pulm edema vs malignancy, RVP & UA negative for infection. Will treat empirically for possible HCAP  - c/w vancomycin, aztreonam, & azithromycin  - send Ulegionella & pro-calcitonin  - f/u BCx    #Pulm      #Renal  Cr stable  - monitor BMP    #Heme  - DVT ppx: 64 yo F h/o severe cardiomyopathy c/b CHF on home milrinone infusion via PICC, DM2, HTN, thyroid cancer presents for mechanical fall, found to have SDH & septic shock likely 2/2 PNA.    #Neuro  SDH s/p mechanical fall with head trauma, lethargic but A&Ox3 w/o focal deficits, likely also component of acute metabolic encephalopathy.  - frequent neuro checks  - appreciate neurosurgery recs    #CV  Hypotension likely 2/2 septic shock i/s/o PNA vs cardiogenic shock. Concern for possible malfunction of pt's milrinone infusion. Course c/b Vtach  - amniodarone stat  - c/w norepinephrine gtt, titrate to maintain MAP > 65  - c/w home milrinone gtt  - cardiology consult  - EP consult    #Pulm  Possible PNA  - tx for PNA as below    #ID  Septic shock w fever 38.8, WBC 20.8, CT chest w groundglass opacities in RML & RLL consistent w PNA vs pulm edema vs malignancy, RVP & UA negative for infection. Will treat empirically for possible HCAP  - c/w vancomycin, aztreonam, & azithromycin  - send Ulegionella & pro-calcitonin  - f/u BCx      #Renal  Cr stable  - monitor BMP    #MSK  Mechanical fall w LOC, CT with SDH. Otherwise no e/o trauma.  - PT consult when improved  - appreciate trauma surgery recs    #Heme  - DVT ppx: SCDs, hold pharmacological ppx 62 yo F h/o severe cardiomyopathy c/b CHF on home milrinone infusion via PICC, DM2, HTN, thyroid cancer presents for mechanical fall, found to have SDH & septic shock likely 2/2 PNA.    #Neuro  SDH s/p mechanical fall with head trauma, lethargic but A&Ox3 w/o focal deficits, likely also component of acute metabolic encephalopathy.  - serial head CT  - frequent neuro checks  - avoid anticoagulation  - appreciate neurosurgery recs    #CV  Hypotension likely 2/2 septic shock i/s/o PNA vs cardiogenic shock. Concern for possible malfunction of pt's milrinone infusion. Course c/b Vtach.  - amniodarone 150 mg stat  - c/w norepinephrine gtt, titrate to maintain MAP > 65  - c/w milrinone gtt  - cardiology consult  - EP consult    #Pulm  Possible PNA  - tx for PNA as below    #ID  Septic shock w fever 38.8, WBC 20.8, CT chest w groundglass opacities in RML & RLL consistent w PNA vs pulm edema vs malignancy, RVP & UA negative for infection. Will treat empirically for possible HCAP  - c/w vancomycin 1g q12h & monitor vanc trough  - c/w aztronam 1g q8h  - c/w azithromycin 500 mg qD  - start metronidazole 500 mg q8h  - send Ulegionella & pro-calcitonin  - f/u BCx      #Renal  Hypokalemia & hypomagnesemia  - monitor BMP, replete for K > 4, Mg >2    #MSK  Mechanical fall down stairs w head trauma & LOC, found to have SDH, no other evidence of trauma on imaging.  - PT consult when improved  - appreciate trauma surgery recs    #Heme  - DVT ppx: SCDs, hold pharmacological ppx        Eloisa Hill PGY-1  Internal Medicine [facility]  Pager [phone] / [phone] 62 yo F h/o severe cardiomyopathy c/b CHF on home milrinone infusion via PICC, DM2, HTN, thyroid cancer presents for mechanical fall, found to have SDH, admitted to MICU for multifactorial shock likely cardiogenic and septic shock i/s/o possible PNA.    #Neuro  SDH s/p mechanical fall with head trauma i/s/o elliquis, last dose yesterday morning. Currently lethargic but arousable A&Ox3 w/o focal deficits, likely also component of acute metabolic encephalopathy i/s/o septic shock. SDH stable on repeat CT head.   - serial head CT  - frequent neuro checks  - avoid anticoagulation  - appreciate neurosurgery recs    #CV  Severe cardiomyopathy c/b CHF & Afib s/p AICD on home milrinone infusion via PICC. TTE from November 2018 w LVEF 10%.  Multifactorial shock w cardiogenic & septic shock i/s/o PNA. Prolonged QTc 602 ms. Course c/b Vtach x 7 beats. Troponin 37.  - amniodarone 150 mg stat  - c/w norepinephrine gtt, titrate to maintain MAP > 65  - c/w milrinone gtt  - TTE  - EP consult for AICD interrogation  - HF consult for diuresis & optimization  - trend troponins  - send thyroid studies  - hold elliquis i/s/o SDH  - monitor telemetry    #Pulm  CT chest w possible PNA vs pulm edema vs malignancy  - POCUS this am for further eval  - f/u final read of chest CT  - bipap as needed to maintain SpO2 > 92%  - tx for possible PNA as below    #ID  Septic shock w fever 38.8, WBC 20.8, CT chest w groundglass opacities in RML & RLL consistent w PNA vs pulm edema vs malignancy, RVP, & UA negative for infection. Treating empirically for possible HCAP.  - c/w vancomycin 1g q12h & monitor vanc trough  - c/w aztronam 1g q8h  - c/w azithromycin 500 mg qD  - start metronidazole 500 mg q8h  - send Ulegionella & pro-calcitonin  - f/u BCx    #Renal  Hypokalemia & hypomagnesemia. Cr stable.  - monitor BMP, replete for K > 4, Mg > 2    #MSK  Mechanical fall down stairs w head trauma & LOC, found to have SDH, no other evidence of trauma on imaging.  - PT consult when improved  - appreciate trauma surgery recs    #Heme  - DVT ppx: SCDs, hold pharmacological ppx        Eloisa Hill PGY-1  Internal Medicine [facility]  Pager [phone] / [phone] 64 yo F h/o severe cardiomyopathy c/b CHF on home milrinone infusion via PICC, DM2, HTN, thyroid cancer presents for mechanical fall, found to have SDH, admitted to MICU for multifactorial shock likely cardiogenic and septic shock i/s/o possible PNA.    #Neuro  SDH s/p mechanical fall with head trauma i/s/o elliquis, last dose yesterday morning. Currently lethargic but arousable A&Ox3 w/o focal deficits, likely also component of acute metabolic encephalopathy i/s/o septic shock. Ammonia wnl. SDH stable on repeat CT head. S/p kaycentra for elevated INR 1.7.  - serial head CT  - frequent neuro checks  - monitor coags, give kaycentra as needed for elevated INR, avoid anticoagulation  - appreciate neurosurgery recs    #CV  Severe cardiomyopathy c/b CHF & Afib s/p AICD on home milrinone infusion via PICC. TTE from November 2018 w LVEF 10%.  Multifactorial shock w cardiogenic & septic shock i/s/o PNA. Prolonged QTc 602 ms. Course c/b Vtach x 7 beats. Troponin 37.  - amniodarone 150 mg stat  - c/w norepinephrine gtt, titrate to maintain MAP > 65  - c/w milrinone gtt  - TTE  - EP consult for AICD interrogation  - HF consult for diuresis & optimization  - trend troponins  - send thyroid studies  - hold elliquis i/s/o SDH  - monitor telemetry    #Pulm  CT chest w possible PNA vs pulm edema vs malignancy  - POCUS this am for further eval  - f/u final read of chest CT  - bipap as needed to maintain SpO2 > 92%  - tx for possible PNA as below    #ID  Septic shock w fever 38.8, WBC 20.8, CT chest w groundglass opacities in RML & RLL consistent w PNA vs pulm edema vs malignancy, RVP, & UA negative for infection. Treating empirically for possible HCAP.  - c/w vancomycin 1g q12h & monitor vanc trough  - c/w aztronam 1g q8h  - c/w azithromycin 500 mg qD  - start metronidazole 500 mg q8h  - send Ulegionella & pro-calcitonin  - f/u BCx    #Renal  Hypokalemia & hypomagnesemia. Cr stable.  - monitor BMP, replete for K > 4, Mg > 2    #GI  Abd mildly distended, non-tender. Cholelithiasis on prior US. Lipase 140. Ammonia wnl.   - NPO for now  - consider abd US if no clear source of infection    #MSK  Mechanical fall down stairs w head trauma & LOC, found to have SDH, no other evidence of trauma on imaging.  - PT consult when improved  - appreciate trauma surgery recs    #Heme  - DVT ppx: SCDs, hold pharmacological ppx  - tx SDH as above    #GOC  - Full code confirmed with pt      Eloisa Hill PGY-1  Internal Medicine [facility]  Pager [phone] / [phone] 64 yo F h/o severe cardiomyopathy c/b CHF w EF 10% s/p AICD on home milrinone infusion via PICC, afib on aspirin & elliquis, DM2, HTN, thyroid cancer s/p thyroidectomy presents for mechanical fall, found to have SDH, admitted to MICU for multifactorial shock likely cardiogenic and septic shock i/s/o possible PNA.    #Neuro  SDH s/p mechanical fall with head trauma i/s/o elliquis, last dose yesterday morning. Currently lethargic but arousable A&Ox3 w/o focal deficits, likely also component of acute metabolic encephalopathy i/s/o septic shock. Ammonia wnl. SDH stable on repeat CT head. S/p kaycentra for elevated INR 1.7.  - serial head CT  - frequent neuro checks  - monitor coags, give kaycentra as needed for elevated INR, avoid anticoagulation  - appreciate neurosurgery recs    #CV  Severe cardiomyopathy c/b CHF s/p AICD on home milrinone infusion via PICC.  TTE from November 2018 w LVEF 10%.  Afib on aspirin & elliquis. Course c/b Vtach x 7 beats.  Multifactorial shock w cardiogenic & septic shock i/s/o PNA. Prolonged QTc 602 ms. Troponin 37.  - amniodarone 150 mg stat  - c/w norepinephrine gtt, titrate to maintain MAP > 65  - c/w milrinone gtt  - TTE  - EP consult for AICD interrogation  - HF consult for diuresis & optimization  - trend troponins  - send thyroid studies  - hold elliquis & aspirin i/s/o SDH  - monitor telemetry    #Pulm  CT chest w groundglass opacities consistent w PNA vs pulm edema vs malignancy  - f/u final read of chest CT  - POCUS this am for further eval  - bipap as needed to maintain SpO2 > 92%  - tx for possible PNA as below    #ID  Septic shock w fever 38.8, WBC 20.8, CT chest w groundglass opacities in RML & RLL consistent w PNA vs pulm edema vs malignancy, RVP, & UA negative for infection. Treating empirically for possible HCAP.  - c/w vancomycin 1g q12h & monitor vanc trough  - c/w aztronam 1g q8h  - c/w azithromycin 500 mg qD  - start metronidazole 500 mg q8h  - send Ulegionella & pro-calcitonin  - consider LP  - f/u BCx    #Renal  Hypokalemia & hypomagnesemia. Cr stable.  - monitor BMP, replete for K > 4, Mg > 2    #GI  Abd mildly distended, non-tender. Cholelithiasis on prior US. Lipase 140. Ammonia wnl.   - NPO for now  - consider abd US if no clear source of infection    #Endo  DM2  - ISS q6h while NPO  Thyroid cancer s/p thyroidectomy  - send thyroid studies    #MSK  Mechanical fall down stairs w head trauma & LOC, found to have SDH, no other evidence of trauma on imaging.  - PT consult when improved  - appreciate trauma surgery recs    #Heme  - DVT ppx: SCDs, hold pharmacological ppx  - tx SDH as above    #GOC  - Full code confirmed with pt      Eloisa Hill PGY-1  Internal Medicine [facility]  Pager [phone] / [phone] 64 yo F h/o severe cardiomyopathy c/b CHF w EF 10% s/p AICD on home milrinone infusion via PICC, afib on aspirin & elliquis, DM2, HTN, thyroid cancer s/p thyroidectomy presents for mechanical fall, found to have SDH, admitted to MICU for multifactorial shock likely cardiogenic and septic shock i/s/o possible PNA.    #Neuro  SDH s/p mechanical fall with head trauma i/s/o elliquis, last dose yesterday morning. Currently lethargic but arousable A&Ox3 w/o focal deficits, likely also component of acute metabolic encephalopathy i/s/o septic shock. Ammonia wnl. SDH stable on repeat CT head. S/p kaycentra for elevated INR 1.7.  - serial head CT  - frequent neuro checks  - monitor coags, give kaycentra as needed for elevated INR, avoid anticoagulation  - appreciate neurosurgery recs    #CV  Severe cardiomyopathy c/b CHF s/p AICD on home milrinone infusion via PICC.  TTE from November 2018 w LVEF 10%.  Afib on aspirin & elliquis. Course c/b Vtach x 7 beats.  Multifactorial shock w cardiogenic & septic shock i/s/o PNA. Prolonged QTc 602 ms. Troponin 37.  - amniodarone 150 mg stat  - c/w norepinephrine gtt, titrate to maintain MAP > 65  - c/w milrinone gtt  - TTE  - EP consult for AICD interrogation  - HF consult for diuresis & optimization  - trend troponins  - send thyroid studies  - hold elliquis & aspirin i/s/o SDH  - monitor telemetry    #Pulm  CT chest w groundglass opacities consistent w PNA vs pulm edema vs malignancy. Satting well on room air.  - f/u final read of chest CT  - POCUS this am for further eval  - tx for possible PNA as below    #ID  Septic shock w fever 38.8, WBC 20.8, CT chest w groundglass opacities in RML & RLL consistent w PNA vs pulm edema vs malignancy, RVP, & UA negative for infection. Treating empirically for possible HCAP though low suspicion, will also cover for possible PICC line infection.  - c/w vancomycin 1g q12h & monitor vanc trough  - c/w aztronam 1g q8h  - c/w azithromycin 500 mg qD  - start metronidazole 500 mg q8h --> update: plan to D/C  - send Ulegionella & pro-calcitonin  - send PICC Cx & remove PICC line.  - consider LP  - f/u BCx    #Renal  Hypokalemia & hypomagnesemia. Cr stable.  - monitor BMP, replete for K > 4, Mg > 2    #GI  Congestive hepatopathy. Abd mildly distended, non-tender. Cholelithiasis on prior US. Lipase 140. Ammonia wnl.   - NPO for now, advance as tolerated  - consider abd US if no clear source of infection    #Endo  DM2  - ISS q6h while NPO  Thyroid cancer s/p thyroidectomy  - send thyroid studies    #MSK  Mechanical fall down stairs w head trauma & LOC, found to have SDH, no other evidence of trauma on imaging.  - PT consult when improved  - appreciate trauma surgery recs    #Heme  - DVT ppx: SCDs, hold pharmacological ppx  - tx SDH as above    #GOC  - Full code confirmed with pt      Eloisa Hill PGY-1  Internal Medicine [facility]  Pager [phone] / [phone]

## 2019-03-21 LAB
ALBUMIN SERPL ELPH-MCNC: 2.6 G/DL — LOW (ref 3.3–5)
ALBUMIN SERPL ELPH-MCNC: 2.8 G/DL — LOW (ref 3.3–5)
ALBUMIN SERPL ELPH-MCNC: 3 G/DL — LOW (ref 3.3–5)
ALBUMIN SERPL ELPH-MCNC: 3.1 G/DL — LOW (ref 3.3–5)
ALP SERPL-CCNC: 401 U/L — HIGH (ref 40–120)
ALP SERPL-CCNC: 406 U/L — HIGH (ref 40–120)
ALP SERPL-CCNC: 416 U/L — HIGH (ref 40–120)
ALP SERPL-CCNC: 419 U/L — HIGH (ref 40–120)
ALT FLD-CCNC: 32 U/L — SIGNIFICANT CHANGE UP (ref 10–45)
ALT FLD-CCNC: 33 U/L — SIGNIFICANT CHANGE UP (ref 10–45)
ALT FLD-CCNC: 34 U/L — SIGNIFICANT CHANGE UP (ref 10–45)
ALT FLD-CCNC: 36 U/L — SIGNIFICANT CHANGE UP (ref 10–45)
ANION GAP SERPL CALC-SCNC: 15 MMOL/L — SIGNIFICANT CHANGE UP (ref 5–17)
ANION GAP SERPL CALC-SCNC: 16 MMOL/L — SIGNIFICANT CHANGE UP (ref 5–17)
ANION GAP SERPL CALC-SCNC: 17 MMOL/L — SIGNIFICANT CHANGE UP (ref 5–17)
ANION GAP SERPL CALC-SCNC: 18 MMOL/L — HIGH (ref 5–17)
APTT BLD: 31.7 SEC — SIGNIFICANT CHANGE UP (ref 27.5–36.3)
APTT BLD: 32.2 SEC — SIGNIFICANT CHANGE UP (ref 27.5–36.3)
APTT BLD: 34.1 SEC — SIGNIFICANT CHANGE UP (ref 27.5–36.3)
AST SERPL-CCNC: 49 U/L — HIGH (ref 10–40)
AST SERPL-CCNC: 49 U/L — HIGH (ref 10–40)
AST SERPL-CCNC: 50 U/L — HIGH (ref 10–40)
AST SERPL-CCNC: 52 U/L — HIGH (ref 10–40)
BASOPHILS # BLD AUTO: 0.1 K/UL — SIGNIFICANT CHANGE UP (ref 0–0.2)
BASOPHILS NFR BLD AUTO: 0.2 % — SIGNIFICANT CHANGE UP (ref 0–2)
BILIRUB SERPL-MCNC: 6.4 MG/DL — HIGH (ref 0.2–1.2)
BILIRUB SERPL-MCNC: 7.3 MG/DL — HIGH (ref 0.2–1.2)
BILIRUB SERPL-MCNC: 7.7 MG/DL — HIGH (ref 0.2–1.2)
BILIRUB SERPL-MCNC: 8.4 MG/DL — HIGH (ref 0.2–1.2)
BUN SERPL-MCNC: 26 MG/DL — HIGH (ref 7–23)
BUN SERPL-MCNC: 29 MG/DL — HIGH (ref 7–23)
BUN SERPL-MCNC: 30 MG/DL — HIGH (ref 7–23)
BUN SERPL-MCNC: 32 MG/DL — HIGH (ref 7–23)
CALCIUM SERPL-MCNC: 7.5 MG/DL — LOW (ref 8.4–10.5)
CALCIUM SERPL-MCNC: 7.6 MG/DL — LOW (ref 8.4–10.5)
CALCIUM SERPL-MCNC: 7.7 MG/DL — LOW (ref 8.4–10.5)
CALCIUM SERPL-MCNC: 7.9 MG/DL — LOW (ref 8.4–10.5)
CHLORIDE SERPL-SCNC: 83 MMOL/L — LOW (ref 96–108)
CHLORIDE SERPL-SCNC: 83 MMOL/L — LOW (ref 96–108)
CHLORIDE SERPL-SCNC: 87 MMOL/L — LOW (ref 96–108)
CHLORIDE SERPL-SCNC: 87 MMOL/L — LOW (ref 96–108)
CO2 SERPL-SCNC: 26 MMOL/L — SIGNIFICANT CHANGE UP (ref 22–31)
CO2 SERPL-SCNC: 29 MMOL/L — SIGNIFICANT CHANGE UP (ref 22–31)
CREAT SERPL-MCNC: 1.02 MG/DL — SIGNIFICANT CHANGE UP (ref 0.5–1.3)
CREAT SERPL-MCNC: 1.11 MG/DL — SIGNIFICANT CHANGE UP (ref 0.5–1.3)
CREAT SERPL-MCNC: 1.19 MG/DL — SIGNIFICANT CHANGE UP (ref 0.5–1.3)
CREAT SERPL-MCNC: 1.38 MG/DL — HIGH (ref 0.5–1.3)
EOSINOPHIL # BLD AUTO: 0.1 K/UL — SIGNIFICANT CHANGE UP (ref 0–0.5)
EOSINOPHIL NFR BLD AUTO: 0.2 % — SIGNIFICANT CHANGE UP (ref 0–6)
GLUCOSE BLDC GLUCOMTR-MCNC: 116 MG/DL — HIGH (ref 70–99)
GLUCOSE BLDC GLUCOMTR-MCNC: 231 MG/DL — HIGH (ref 70–99)
GLUCOSE BLDC GLUCOMTR-MCNC: 248 MG/DL — HIGH (ref 70–99)
GLUCOSE BLDC GLUCOMTR-MCNC: 252 MG/DL — HIGH (ref 70–99)
GLUCOSE SERPL-MCNC: 110 MG/DL — HIGH (ref 70–99)
GLUCOSE SERPL-MCNC: 166 MG/DL — HIGH (ref 70–99)
GLUCOSE SERPL-MCNC: 218 MG/DL — HIGH (ref 70–99)
GLUCOSE SERPL-MCNC: 255 MG/DL — HIGH (ref 70–99)
HCT VFR BLD CALC: 31.3 % — LOW (ref 34.5–45)
HCT VFR BLD CALC: 32.3 % — LOW (ref 34.5–45)
HGB BLD-MCNC: 10.3 G/DL — LOW (ref 11.5–15.5)
HGB BLD-MCNC: 9.9 G/DL — LOW (ref 11.5–15.5)
INR BLD: 2.17 RATIO — HIGH (ref 0.88–1.16)
INR BLD: 2.37 RATIO — HIGH (ref 0.88–1.16)
LACTATE SERPL-SCNC: 2.2 MMOL/L — HIGH (ref 0.7–2)
LEGIONELLA AG UR QL: NEGATIVE — SIGNIFICANT CHANGE UP
LYMPHOCYTES # BLD AUTO: 1.8 K/UL — SIGNIFICANT CHANGE UP (ref 1–3.3)
LYMPHOCYTES # BLD AUTO: 4.7 % — LOW (ref 13–44)
MAGNESIUM SERPL-MCNC: 2 MG/DL — SIGNIFICANT CHANGE UP (ref 1.6–2.6)
MCHC RBC-ENTMCNC: 24.3 PG — LOW (ref 27–34)
MCHC RBC-ENTMCNC: 24.6 PG — LOW (ref 27–34)
MCHC RBC-ENTMCNC: 31.8 GM/DL — LOW (ref 32–36)
MCHC RBC-ENTMCNC: 32 GM/DL — SIGNIFICANT CHANGE UP (ref 32–36)
MCV RBC AUTO: 76.3 FL — LOW (ref 80–100)
MCV RBC AUTO: 76.9 FL — LOW (ref 80–100)
MONOCYTES # BLD AUTO: 2 K/UL — HIGH (ref 0–0.9)
MONOCYTES NFR BLD AUTO: 5.2 % — SIGNIFICANT CHANGE UP (ref 2–14)
NEUTROPHILS # BLD AUTO: 34.6 K/UL — HIGH (ref 1.8–7.4)
NEUTROPHILS NFR BLD AUTO: 89.7 % — HIGH (ref 43–77)
PHOSPHATE SERPL-MCNC: 4 MG/DL — SIGNIFICANT CHANGE UP (ref 2.5–4.5)
PLAT MORPH BLD: NORMAL — SIGNIFICANT CHANGE UP
PLATELET # BLD AUTO: 225 K/UL — SIGNIFICANT CHANGE UP (ref 150–400)
PLATELET # BLD AUTO: 228 K/UL — SIGNIFICANT CHANGE UP (ref 150–400)
POTASSIUM SERPL-MCNC: 3.4 MMOL/L — LOW (ref 3.5–5.3)
POTASSIUM SERPL-MCNC: 3.5 MMOL/L — SIGNIFICANT CHANGE UP (ref 3.5–5.3)
POTASSIUM SERPL-MCNC: 3.6 MMOL/L — SIGNIFICANT CHANGE UP (ref 3.5–5.3)
POTASSIUM SERPL-MCNC: 3.8 MMOL/L — SIGNIFICANT CHANGE UP (ref 3.5–5.3)
POTASSIUM SERPL-SCNC: 3.4 MMOL/L — LOW (ref 3.5–5.3)
POTASSIUM SERPL-SCNC: 3.5 MMOL/L — SIGNIFICANT CHANGE UP (ref 3.5–5.3)
POTASSIUM SERPL-SCNC: 3.6 MMOL/L — SIGNIFICANT CHANGE UP (ref 3.5–5.3)
POTASSIUM SERPL-SCNC: 3.8 MMOL/L — SIGNIFICANT CHANGE UP (ref 3.5–5.3)
PROT SERPL-MCNC: 7 G/DL — SIGNIFICANT CHANGE UP (ref 6–8.3)
PROT SERPL-MCNC: 7.3 G/DL — SIGNIFICANT CHANGE UP (ref 6–8.3)
PROT SERPL-MCNC: 7.4 G/DL — SIGNIFICANT CHANGE UP (ref 6–8.3)
PROT SERPL-MCNC: 7.7 G/DL — SIGNIFICANT CHANGE UP (ref 6–8.3)
PROTHROM AB SERPL-ACNC: 25.6 SEC — HIGH (ref 10–12.9)
PROTHROM AB SERPL-ACNC: 27.7 SEC — HIGH (ref 10–12.9)
RBC # BLD: 4.1 M/UL — SIGNIFICANT CHANGE UP (ref 3.8–5.2)
RBC # BLD: 4.2 M/UL — SIGNIFICANT CHANGE UP (ref 3.8–5.2)
RBC # FLD: 23.1 % — HIGH (ref 10.3–14.5)
RBC # FLD: 23.4 % — HIGH (ref 10.3–14.5)
RBC BLD AUTO: SIGNIFICANT CHANGE UP
SODIUM SERPL-SCNC: 127 MMOL/L — LOW (ref 135–145)
SODIUM SERPL-SCNC: 128 MMOL/L — LOW (ref 135–145)
SODIUM SERPL-SCNC: 128 MMOL/L — LOW (ref 135–145)
SODIUM SERPL-SCNC: 130 MMOL/L — LOW (ref 135–145)
WBC # BLD: 28.5 K/UL — HIGH (ref 3.8–10.5)
WBC # BLD: 38.6 K/UL — HIGH (ref 3.8–10.5)
WBC # FLD AUTO: 28.5 K/UL — HIGH (ref 3.8–10.5)
WBC # FLD AUTO: 38.6 K/UL — HIGH (ref 3.8–10.5)

## 2019-03-21 PROCEDURE — 99291 CRITICAL CARE FIRST HOUR: CPT

## 2019-03-21 PROCEDURE — 93010 ELECTROCARDIOGRAM REPORT: CPT

## 2019-03-21 PROCEDURE — 99221 1ST HOSP IP/OBS SF/LOW 40: CPT

## 2019-03-21 PROCEDURE — 99232 SBSQ HOSP IP/OBS MODERATE 35: CPT

## 2019-03-21 RX ORDER — BUMETANIDE 0.25 MG/ML
2 INJECTION INTRAMUSCULAR; INTRAVENOUS ONCE
Qty: 0 | Refills: 0 | Status: COMPLETED | OUTPATIENT
Start: 2019-03-21 | End: 2019-03-21

## 2019-03-21 RX ORDER — DEXTROSE 50 % IN WATER 50 %
15 SYRINGE (ML) INTRAVENOUS ONCE
Qty: 0 | Refills: 0 | Status: DISCONTINUED | OUTPATIENT
Start: 2019-03-21 | End: 2019-03-28

## 2019-03-21 RX ORDER — INSULIN LISPRO 100/ML
VIAL (ML) SUBCUTANEOUS AT BEDTIME
Qty: 0 | Refills: 0 | Status: DISCONTINUED | OUTPATIENT
Start: 2019-03-21 | End: 2019-03-28

## 2019-03-21 RX ORDER — GLUCAGON INJECTION, SOLUTION 0.5 MG/.1ML
1 INJECTION, SOLUTION SUBCUTANEOUS ONCE
Qty: 0 | Refills: 0 | Status: DISCONTINUED | OUTPATIENT
Start: 2019-03-21 | End: 2019-03-28

## 2019-03-21 RX ORDER — SODIUM CHLORIDE 9 MG/ML
1000 INJECTION, SOLUTION INTRAVENOUS
Qty: 0 | Refills: 0 | Status: DISCONTINUED | OUTPATIENT
Start: 2019-03-21 | End: 2019-03-28

## 2019-03-21 RX ORDER — POTASSIUM CHLORIDE 20 MEQ
20 PACKET (EA) ORAL
Qty: 0 | Refills: 0 | Status: COMPLETED | OUTPATIENT
Start: 2019-03-21 | End: 2019-03-21

## 2019-03-21 RX ORDER — MEROPENEM 1 G/30ML
1000 INJECTION INTRAVENOUS EVERY 8 HOURS
Qty: 0 | Refills: 0 | Status: DISCONTINUED | OUTPATIENT
Start: 2019-03-21 | End: 2019-03-22

## 2019-03-21 RX ORDER — DEXTROSE 50 % IN WATER 50 %
25 SYRINGE (ML) INTRAVENOUS ONCE
Qty: 0 | Refills: 0 | Status: DISCONTINUED | OUTPATIENT
Start: 2019-03-21 | End: 2019-03-28

## 2019-03-21 RX ORDER — DEXTROSE 50 % IN WATER 50 %
12.5 SYRINGE (ML) INTRAVENOUS ONCE
Qty: 0 | Refills: 0 | Status: DISCONTINUED | OUTPATIENT
Start: 2019-03-21 | End: 2019-03-28

## 2019-03-21 RX ORDER — PHYTONADIONE (VIT K1) 5 MG
10 TABLET ORAL DAILY
Qty: 0 | Refills: 0 | Status: COMPLETED | OUTPATIENT
Start: 2019-03-21 | End: 2019-03-23

## 2019-03-21 RX ORDER — BENZOCAINE AND MENTHOL 5; 1 G/100ML; G/100ML
1 LIQUID ORAL EVERY 4 HOURS
Qty: 0 | Refills: 0 | Status: DISCONTINUED | OUTPATIENT
Start: 2019-03-21 | End: 2019-03-28

## 2019-03-21 RX ORDER — INSULIN LISPRO 100/ML
VIAL (ML) SUBCUTANEOUS
Qty: 0 | Refills: 0 | Status: DISCONTINUED | OUTPATIENT
Start: 2019-03-21 | End: 2019-03-28

## 2019-03-21 RX ORDER — MEROPENEM 1 G/30ML
1000 INJECTION INTRAVENOUS EVERY 12 HOURS
Qty: 0 | Refills: 0 | Status: DISCONTINUED | OUTPATIENT
Start: 2019-03-21 | End: 2019-03-21

## 2019-03-21 RX ADMIN — Medication 20 MILLIEQUIVALENT(S): at 14:24

## 2019-03-21 RX ADMIN — Medication 6: at 11:21

## 2019-03-21 RX ADMIN — Medication 102 MILLIGRAM(S): at 09:44

## 2019-03-21 RX ADMIN — Medication 20 MILLIEQUIVALENT(S): at 16:37

## 2019-03-21 RX ADMIN — MEROPENEM 100 MILLIGRAM(S): 1 INJECTION INTRAVENOUS at 21:30

## 2019-03-21 RX ADMIN — MILRINONE LACTATE 11.98 MICROGRAM(S)/KG/MIN: 1 INJECTION, SOLUTION INTRAVENOUS at 12:06

## 2019-03-21 RX ADMIN — BENZOCAINE AND MENTHOL 1 LOZENGE: 5; 1 LIQUID ORAL at 23:29

## 2019-03-21 RX ADMIN — Medication 4: at 17:16

## 2019-03-21 RX ADMIN — BUMETANIDE 2 MILLIGRAM(S): 0.25 INJECTION INTRAMUSCULAR; INTRAVENOUS at 04:10

## 2019-03-21 RX ADMIN — Medication 250 MILLIGRAM(S): at 05:01

## 2019-03-21 RX ADMIN — CHLORHEXIDINE GLUCONATE 1 APPLICATION(S): 213 SOLUTION TOPICAL at 05:01

## 2019-03-21 RX ADMIN — Medication 3.75 MICROGRAM(S)/KG/MIN: at 12:06

## 2019-03-21 RX ADMIN — Medication 3.75 MICROGRAM(S)/KG/MIN: at 04:10

## 2019-03-21 NOTE — PROGRESS NOTE ADULT - SUBJECTIVE AND OBJECTIVE BOX
[facility] [phone]        Patient seen and examined in the chair.  She is on Levo and Milrinone         MEDICATIONS  (STANDING):  chlorhexidine 4% Liquid 1 Application(s) Topical <User Schedule>  dextrose 5%. 1000 milliLiter(s) (50 mL/Hr) IV Continuous <Continuous>  dextrose 50% Injectable 12.5 Gram(s) IV Push once  dextrose 50% Injectable 25 Gram(s) IV Push once  dextrose 50% Injectable 25 Gram(s) IV Push once  insulin lispro (HumaLOG) corrective regimen sliding scale   SubCutaneous three times a day before meals  insulin lispro (HumaLOG) corrective regimen sliding scale   SubCutaneous at bedtime  investigational medication - general 50 milliGRAM(s) IV Push every 6 hours  investigational medication - IVPB 1500 milliGRAM(s) IV Intermittent every 6 hours  meropenem  IVPB 1000 milliGRAM(s) IV Intermittent every 12 hours  milrinone Infusion 0.5 MICROgram(s)/kG/Min (11.985 mL/Hr) IV Continuous <Continuous>  norepinephrine Infusion 0.05 MICROgram(s)/kG/Min (3.745 mL/Hr) IV Continuous <Continuous>  phytonadione  IVPB 10 milliGRAM(s) IV Intermittent daily  rifaximin 550 milliGRAM(s) Oral two times a day  vancomycin  IVPB 1000 milliGRAM(s) IV Intermittent every 12 hours      VITAL:  T(C): , Max: 36.8 (19 @ 03:00)  T(F): , Max: 98.3 (19 @ 11:00)  HR: 104 (19 @ 12:15)  BP: 96/63 (19 @ 12:15)  BP(mean): 75 (19 @ 12:15)  RR: 14 (19 @ 12:15)  SpO2: 99% (19 @ 12:15)  Wt(kg): --    I and O's:     @ 07:01  -   @ 07:00  --------------------------------------------------------  IN: 8.7 mL / OUT: 1184 mL / NET: 904.7 mL     @ 07:01  -   @ 12:25  --------------------------------------------------------  IN: 382.3 mL / OUT: 612 mL / NET: -229.7 mL          PHYSICAL EXAM:    Constitutional: NAD  Neck:  +  JVD  Respiratory: CTAB/L  Cardiovascular: S1 and S2  Gastrointestinal: BS+, soft, NT/ND  Extremities: No peripheral edema  Neurological: A/O x 3, no focal deficits  Psychiatric: Normal mood, normal affect  : +  Gustafson  Skin: No rashes  Access: Not applicable    LABS:                        9.9    38.6  )-----------( 225      ( 21 Mar 2019 04:27 )             31.3         130<L>  |  87<L>  |  32<H>  ----------------------------<  110<H>  3.8   |  26  |  1.38<H>    Ca    7.7<L>      21 Mar 2019 04:27  Phos  4.0       Mg     2.0         TPro  7.0  /  Alb  2.6<L>  /  TBili  7.7<H>  /  DBili  x   /  AST  52<H>  /  ALT  34  /  AlkPhos  416<H>            Urine Studies:  Urinalysis Basic - ( 19 Mar 2019 20:28 )    Color: Light Yellow / Appearance: Clear / S.008 / pH: x  Gluc: x / Ketone: Negative  / Bili: Negative / Urobili: Negative   Blood: x / Protein: Negative / Nitrite: Negative   Leuk Esterase: Negative / RBC: x / WBC x   Sq Epi: x / Non Sq Epi: x / Bacteria: x            RADIOLOGY & ADDITIONAL STUDIES:

## 2019-03-21 NOTE — PROGRESS NOTE ADULT - ASSESSMENT
63 year old female PMH severe cardiomyopathy c/b CHF w EF 10% s/p AICD on home milrinone infusion via PICC, afib on aspirin & Eliquis, DM2, HTN, thyroid cancer s/p thyroidectomy who presented for mechanical fall on 3/19/19. In the ED febrile to 101.8, tachycardic to > 130, hypotensive to SBP in the 80's. WBC on presentation of 20.8 with 83% PMN. Transaminitis with Alk Phos of 451, , ALT of 43 and T Bili of 4.7. Lactic acid of 2.5. U/A with negative leukocyte esterase and negative nitrites. RVP negative. CT Chest/Abdomen/Pelvis with no obvious intraabdominal pathology and chest with ground glass opacities in the right middle and right lower lobes (present on 11/6/18). Blood cultures resulted with Klebsiella pneumoniae.     Overall, Klebsiella bacteremia likely due to PICC infection - patient with chills with Milrinone infusion changes. Pneumonia is possible however, the ground glass opacities in the lungs were present back in 11/2018. Unsure about etiology of new leukocytosis today - would obtain differentiation and repeat CBC. Would escalate from Aztreonam to Meropenem given vasopressor requirements. Would continue Vancomycin until BCx at 48 hours.    - please increase meropenem dose for improved renal function  -- can d/ c the vancomycin   --Followup Prelim Blood Cultures  - PICC line removed  --Followup susceptibilities of Klebsiella pneumoniae  WBC still elevated but improving

## 2019-03-21 NOTE — PROGRESS NOTE ADULT - SUBJECTIVE AND OBJECTIVE BOX
INTERVAL HPI/OVERNIGHT EVENTS: I feel better and want to eat.   Vital Signs Last 24 Hrs  T(C): 36.6 (21 Mar 2019 07:00), Max: 37.2 (20 Mar 2019 09:30)  T(F): 97.9 (21 Mar 2019 07:00), Max: 99 (20 Mar 2019 09:30)  HR: 104 (21 Mar 2019 08:15) (94 - 116)  BP: 100/65 (21 Mar 2019 08:15) (73/43 - 131/105)  BP(mean): 78 (21 Mar 2019 08:15) (53 - 115)  RR: 13 (21 Mar 2019 08:15) (9 - 40)  SpO2: 100% (21 Mar 2019 08:15) (93% - 100%)  I&O's Summary    20 Mar 2019 07:  -  21 Mar 2019 07:00  --------------------------------------------------------  IN: 2088.7 mL / OUT: 1184 mL / NET: 904.7 mL    21 Mar 2019 07:01  -  21 Mar 2019 08:45  --------------------------------------------------------  IN: 23.2 mL / OUT: 137 mL / NET: -113.8 mL      MEDICATIONS  (STANDING):  chlorhexidine 4% Liquid 1 Application(s) Topical <User Schedule>  dextrose 5%. 1000 milliLiter(s) (50 mL/Hr) IV Continuous <Continuous>  dextrose 50% Injectable 12.5 Gram(s) IV Push once  dextrose 50% Injectable 25 Gram(s) IV Push once  dextrose 50% Injectable 25 Gram(s) IV Push once  insulin lispro (HumaLOG) corrective regimen sliding scale   SubCutaneous every 6 hours  investigational medication - general 50 milliGRAM(s) IV Push every 6 hours  investigational medication - IVPB 1500 milliGRAM(s) IV Intermittent every 6 hours  meropenem  IVPB 1000 milliGRAM(s) IV Intermittent every 24 hours  milrinone Infusion 0.5 MICROgram(s)/kG/Min (11.985 mL/Hr) IV Continuous <Continuous>  norepinephrine Infusion 0.05 MICROgram(s)/kG/Min (3.745 mL/Hr) IV Continuous <Continuous>  vancomycin  IVPB 1000 milliGRAM(s) IV Intermittent every 12 hours    MEDICATIONS  (PRN):  dextrose 40% Gel 15 Gram(s) Oral once PRN Blood Glucose LESS THAN 70 milliGRAM(s)/deciliter  glucagon  Injectable 1 milliGRAM(s) IntraMuscular once PRN Glucose LESS THAN 70 milligrams/deciliter    LABS:                        9.9    38.6  )-----------( 225      ( 21 Mar 2019 04:27 )             31.3         130<L>  |  87<L>  |  32<H>  ----------------------------<  110<H>  3.8   |  26  |  1.38<H>    Ca    7.7<L>      21 Mar 2019 04:27  Phos  4.0       Mg     2.0         TPro  7.0  /  Alb  2.6<L>  /  TBili  7.7<H>  /  DBili  x   /  AST  52<H>  /  ALT  34  /  AlkPhos  416<H>      PT/INR - ( 21 Mar 2019 04:27 )   PT: 25.6 sec;   INR: 2.17 ratio         PTT - ( 21 Mar 2019 04:27 )  PTT:31.7 sec  Urinalysis Basic - ( 19 Mar 2019 20:28 )    Color: Light Yellow / Appearance: Clear / S.008 / pH: x  Gluc: x / Ketone: Negative  / Bili: Negative / Urobili: Negative   Blood: x / Protein: Negative / Nitrite: Negative   Leuk Esterase: Negative / RBC: x / WBC x   Sq Epi: x / Non Sq Epi: x / Bacteria: x      CAPILLARY BLOOD GLUCOSE      POCT Blood Glucose.: 116 mg/dL (21 Mar 2019 00:54)  POCT Blood Glucose.: 208 mg/dL (20 Mar 2019 18:13)  POCT Blood Glucose.: 163 mg/dL (20 Mar 2019 12:39)  POCT Blood Glucose.: 146 mg/dL (20 Mar 2019 10:13)      ABG - ( 20 Mar 2019 08:16 )  pH, Arterial: 7.53  pH, Blood: x     /  pCO2: 35    /  pO2: 80    / HCO3: 29    / Base Excess: 6.1   /  SaO2: 97                Urinalysis Basic - ( 19 Mar 2019 20:28 )    Color: Light Yellow / Appearance: Clear / S.008 / pH: x  Gluc: x / Ketone: Negative  / Bili: Negative / Urobili: Negative   Blood: x / Protein: Negative / Nitrite: Negative   Leuk Esterase: Negative / RBC: x / WBC x   Sq Epi: x / Non Sq Epi: x / Bacteria: x      REVIEW OF SYSTEMS:  CONSTITUTIONAL: No fever, weight loss, or fatigue  EYES: No eye pain, visual disturbances, or discharge  RESPIRATORY: No cough, wheezing, chills or hemoptysis; No shortness of breath  CARDIOVASCULAR: No chest pain, palpitations, dizziness, or leg swelling  GASTROINTESTINAL: No abdominal or epigastric pain. No nausea, vomiting, or hematemesis; No diarrhea or constipation. No melena or hematochezia.  GENITOURINARY: No dysuria, frequency, hematuria, or incontinence  NEUROLOGICAL: No headaches, memory loss, loss of strength, numbness, or tremors      Consultant(s) Notes Reviewed:  [x ] YES  [ ] NO    PHYSICAL EXAM:  GENERAL: NAD, not in any distress ,  HEAD:  Atraumatic, Normocephalic  EYES: EOMI, PERRLA, conjunctiva and sclera clear  ENMT: No tonsillar erythema, exudates, or enlargement; Moist mucous membranes, Good dentition, No lesions  NECK: JVD+++  NERVOUS SYSTEM:  Alert & Oriented X3, No focal deficit   CHEST/LUNG: Good air entry bilateral with no  rales, rhonchi, wheezing, or rubs  HEART: Regular rate and rhythm; No murmurs, rubs, or gallops  ABDOMEN: Soft, Nontender, Nondistended; Bowel sounds present  EXTREMITIES:  2+ Peripheral Pulses, No clubbing, cyanosis, but ++ edema    Care Discussed with Consultants/Other Providers [ x] YES  [ ] NO

## 2019-03-21 NOTE — PROGRESS NOTE ADULT - SUBJECTIVE AND OBJECTIVE BOX
Subjective: pt seen and examined, no complaints, ROS - .     no chest pain or sob on exam   requiring primacor / pressor support  Tele  Afib     buMETAnide Injectable 2 milliGRAM(s) IV Push once  chlorhexidine 4% Liquid 1 Application(s) Topical <User Schedule>  dextrose 40% Gel 15 Gram(s) Oral once PRN  dextrose 5%. 1000 milliLiter(s) IV Continuous <Continuous>  dextrose 50% Injectable 12.5 Gram(s) IV Push once  dextrose 50% Injectable 25 Gram(s) IV Push once  dextrose 50% Injectable 25 Gram(s) IV Push once  glucagon  Injectable 1 milliGRAM(s) IntraMuscular once PRN  insulin lispro (HumaLOG) corrective regimen sliding scale   SubCutaneous every 6 hours  investigational medication - general 50 milliGRAM(s) IV Push every 6 hours  investigational medication - IVPB 1500 milliGRAM(s) IV Intermittent every 6 hours  meropenem  IVPB 1000 milliGRAM(s) IV Intermittent every 24 hours  milrinone Infusion 0.5 MICROgram(s)/kG/Min IV Continuous <Continuous>  norepinephrine Infusion 0.05 MICROgram(s)/kG/Min IV Continuous <Continuous>  vancomycin  IVPB 1000 milliGRAM(s) IV Intermittent every 12 hours                            9.9    56.2  )-----------( 226      ( 20 Mar 2019 17:51 )             30.9       Hemoglobin: 9.9 g/dL (03-20 @ 17:51)  Hemoglobin: 10.3 g/dL (03-20 @ 16:09)  Hemoglobin: 10.9 g/dL (03-20 @ 08:44)  Hemoglobin: 10.7 g/dL (03-20 @ 08:41)  Hemoglobin: 10.8 g/dL (03-19 @ 20:16)      03-20    128<L>  |  85<L>  |  30<H>  ----------------------------<  169<H>  4.0   |  26  |  1.58<H>    Ca    7.9<L>      20 Mar 2019 16:10  Phos  3.5     03-20  Mg     2.0     03-20    TPro  7.3  /  Alb  2.9<L>  /  TBili  7.0<H>  /  DBili  x   /  AST  55<H>  /  ALT  39  /  AlkPhos  442<H>  03-20    Creatinine Trend: 1.58<--, 1.27<--, 1.07<--, 1.07<--, 1.21<--, 0.75<--    COAGS: PT/INR - ( 20 Mar 2019 08:41 )   PT: 20.4 sec;   INR: 1.76 ratio         PTT - ( 20 Mar 2019 16:09 )  PTT:33.5 sec    CARDIAC MARKERS ( 20 Mar 2019 08:41 )  x     / x     / 90 U/L / x     / 2.4 ng/mL        T(C): 36.7 (03-20-19 @ 19:00), Max: 38.8 (03-20-19 @ 05:50)  HR: 101 (03-21-19 @ 02:15) (94 - 133)  BP: 96/60 (03-21-19 @ 02:00) (69/48 - 131/105)  RR: 15 (03-21-19 @ 02:15) (10 - 40)  SpO2: 96% (03-21-19 @ 02:15) (93% - 100%)  Wt(kg): --    I&O's Summary    20 Mar 2019 07:01  -  21 Mar 2019 03:00  --------------------------------------------------------  IN: 1695.9 mL / OUT: 522 mL / NET: 1173.9 mL      	  Lymphatic: No lymphadenopathy trace edema in LE bilaterally   Cardiovascular: Normal S1 S2, + JVD, No murmurs , Peripheral pulses palpable 2+ bilaterally  Respiratory: Lungs clear to auscultation  Gastrointestinal:  Soft, Non-tender, + BS	  Skin: No rashes, No ecchymoses, No cyanosis, warm to touch  Psychiatry:  Mood & affect appropriate      TELEMETRY: Afib       ECG: < from: 12 Lead ECG (03.20.19 @ 05:58) >  WIDE QRS TACHYCARDIA  LEFT AXIS DEVIATION  RIGHT BUNDLE BRANCH BLOCK  LEFT VENTRICULAR HYPERTROPHY WITH REPOLARIZATION ABNORMALITY  INFERIOR INFARCT , AGE UNDETERMINED  ABNORMAL ECG    < end of copied text >    	  RADIOLOGY:  OTHER:     DIAGNOSTIC TESTING:  [ ] Echocardiogram: < from: TTE with Doppler (w/Cont) (11.07.18 @ 05:53) >  Conclusions:  1. Tethered mitral valve leaflets with normal opening.  Mitral annular calcification and thickened  mitral leaflet  tips Moderate mitral regurgitation.  2. Calcified trileaflet aortic valve with normal opening.  3. Moderately dilated left atrium.  LA volume index = 43  cc/m2.  4. Eccentric left ventricular hypertrophy (dilated left  ventricle with normal relative wall thickness).  5. Severe global left ventricularsystolic dysfunction.  Endocardial visualization enhanced with intravenous  injection of Ultrasonic Enhancing Agent (Definity). No LV  thrombus.  Septal flattening consistent with right  ventricular overload.  6. Severe diastolic dysfunction with elevated filling  pressures  7. Severe right atrial enlargement.  8. Right ventricular enlargement with decreased right  ventricular systolic function.  A device wire is noted in  the right heart.  9. Dilated tricuspid annulus with malcoaptation of  tricuspid leaflets. Severe tricuspid regurgitation.  *** No previous Echo exam.    < end of copied text >    [ ]  Catheterization:  [ ] Stress Test:    	         ASSESSMENT/PLAN:  64 yo F with history of severe NICM s/p AICD on home milrinone, AF on ac with eliquis, HTN, DM, thyroid CA s/p thyroidectomy who is being seen for heart failure and cardiomyopathy.    cont to hold ASA at present   Neurosx following for SDH   ABX per ICU   continue with inotropic support with milrinone  heart failure consultation for CHF management  EP follow up appreciated   continue with supportive care per SICU  D/W Dr Muller Subjective: pt seen and examined, no complaints, ROS - .     no chest pain or sob on exam   requiring primacor / pressor support      buMETAnide Injectable 2 milliGRAM(s) IV Push once  chlorhexidine 4% Liquid 1 Application(s) Topical <User Schedule>  dextrose 40% Gel 15 Gram(s) Oral once PRN  dextrose 5%. 1000 milliLiter(s) IV Continuous <Continuous>  dextrose 50% Injectable 12.5 Gram(s) IV Push once  dextrose 50% Injectable 25 Gram(s) IV Push once  dextrose 50% Injectable 25 Gram(s) IV Push once  glucagon  Injectable 1 milliGRAM(s) IntraMuscular once PRN  insulin lispro (HumaLOG) corrective regimen sliding scale   SubCutaneous every 6 hours  investigational medication - general 50 milliGRAM(s) IV Push every 6 hours  investigational medication - IVPB 1500 milliGRAM(s) IV Intermittent every 6 hours  meropenem  IVPB 1000 milliGRAM(s) IV Intermittent every 24 hours  milrinone Infusion 0.5 MICROgram(s)/kG/Min IV Continuous <Continuous>  norepinephrine Infusion 0.05 MICROgram(s)/kG/Min IV Continuous <Continuous>  vancomycin  IVPB 1000 milliGRAM(s) IV Intermittent every 12 hours                            9.9    56.2  )-----------( 226      ( 20 Mar 2019 17:51 )             30.9       Hemoglobin: 9.9 g/dL (03-20 @ 17:51)  Hemoglobin: 10.3 g/dL (03-20 @ 16:09)  Hemoglobin: 10.9 g/dL (03-20 @ 08:44)  Hemoglobin: 10.7 g/dL (03-20 @ 08:41)  Hemoglobin: 10.8 g/dL (03-19 @ 20:16)      03-20    128<L>  |  85<L>  |  30<H>  ----------------------------<  169<H>  4.0   |  26  |  1.58<H>    Ca    7.9<L>      20 Mar 2019 16:10  Phos  3.5     03-20  Mg     2.0     03-20    TPro  7.3  /  Alb  2.9<L>  /  TBili  7.0<H>  /  DBili  x   /  AST  55<H>  /  ALT  39  /  AlkPhos  442<H>  03-20    Creatinine Trend: 1.58<--, 1.27<--, 1.07<--, 1.07<--, 1.21<--, 0.75<--    COAGS: PT/INR - ( 20 Mar 2019 08:41 )   PT: 20.4 sec;   INR: 1.76 ratio         PTT - ( 20 Mar 2019 16:09 )  PTT:33.5 sec    CARDIAC MARKERS ( 20 Mar 2019 08:41 )  x     / x     / 90 U/L / x     / 2.4 ng/mL        T(C): 36.7 (03-20-19 @ 19:00), Max: 38.8 (03-20-19 @ 05:50)  HR: 101 (03-21-19 @ 02:15) (94 - 133)  BP: 96/60 (03-21-19 @ 02:00) (69/48 - 131/105)  RR: 15 (03-21-19 @ 02:15) (10 - 40)  SpO2: 96% (03-21-19 @ 02:15) (93% - 100%)  Wt(kg): --    I&O's Summary    20 Mar 2019 07:01  -  21 Mar 2019 03:00  --------------------------------------------------------  IN: 1695.9 mL / OUT: 522 mL / NET: 1173.9 mL      	  Lymphatic: No lymphadenopathy trace edema in LE bilaterally   Cardiovascular: Normal S1 S2, + JVD, No murmurs , Peripheral pulses palpable 2+ bilaterally  Respiratory: Lungs clear to auscultation  Gastrointestinal:  Soft, Non-tender, + BS	  Skin: No rashes, No ecchymoses, No cyanosis, warm to touch  Psychiatry:  Mood & affect appropriate      TELEMETRY: Afib       ECG: < from: 12 Lead ECG (03.20.19 @ 05:58) >  WIDE QRS TACHYCARDIA  LEFT AXIS DEVIATION  RIGHT BUNDLE BRANCH BLOCK  LEFT VENTRICULAR HYPERTROPHY WITH REPOLARIZATION ABNORMALITY  INFERIOR INFARCT , AGE UNDETERMINED  ABNORMAL ECG    < end of copied text >    	  RADIOLOGY:  OTHER:     DIAGNOSTIC TESTING:  [ ] Echocardiogram: < from: TTE with Doppler (w/Cont) (11.07.18 @ 05:53) >  Conclusions:  1. Tethered mitral valve leaflets with normal opening.  Mitral annular calcification and thickened  mitral leaflet  tips Moderate mitral regurgitation.  2. Calcified trileaflet aortic valve with normal opening.  3. Moderately dilated left atrium.  LA volume index = 43  cc/m2.  4. Eccentric left ventricular hypertrophy (dilated left  ventricle with normal relative wall thickness).  5. Severe global left ventricularsystolic dysfunction.  Endocardial visualization enhanced with intravenous  injection of Ultrasonic Enhancing Agent (Definity). No LV  thrombus.  Septal flattening consistent with right  ventricular overload.  6. Severe diastolic dysfunction with elevated filling  pressures  7. Severe right atrial enlargement.  8. Right ventricular enlargement with decreased right  ventricular systolic function.  A device wire is noted in  the right heart.  9. Dilated tricuspid annulus with malcoaptation of  tricuspid leaflets. Severe tricuspid regurgitation.  *** No previous Echo exam.    < end of copied text >    [ ]  Catheterization:  [ ] Stress Test:    	         ASSESSMENT/PLAN:  64 yo F with history of severe NICM s/p AICD on home milrinone, AF on ac with eliquis, HTN, DM, thyroid CA s/p thyroidectomy who is being seen for heart failure and cardiomyopathy.    cont to hold ASA at present   Neurosx following for SDH   ABX per ICU   continue with inotropic support with milrinone  heart failure consultation for CHF management  continue with supportive care per SICU

## 2019-03-21 NOTE — PROGRESS NOTE ADULT - ATTENDING COMMENTS
EP ATTENDING    Agree with above. f/U with neurosurgery regarding if/when A/C can be ever restarted for atrial fibrillation.

## 2019-03-21 NOTE — PROGRESS NOTE ADULT - ASSESSMENT
The patient is a deidre 63-year-old female with the past medical history of hypertension, diabetes, nonishemic cardiomyopathy, presumed parathyroidectomy, history of thyroid cancer, presents with mechanical fall.  The patient at present has hypervolemic hyponatremia.  She also has hypocalcemia, but again an old finding.    Gram neg rich sepsis          1.	Renal.  Restart her calcitriol and her Os-Donta.     2.	Cardiology.   Continue with milrinone and Levophed to maintain mean arterial pressure.    The patient with worsening liver function test abnormalities/hepatitis.  Total bilirubin is up to 6.     3.	Infectious Disease.  Febrile illness.   Continue with intravenous antibiotics(large salt load).  Further workup pending above.

## 2019-03-21 NOTE — PROGRESS NOTE ADULT - SUBJECTIVE AND OBJECTIVE BOX
PATIENT: DELIA SERRANO   AGE: o     CHIEF COMPLAINT:  Patient is a 63y old  Female who presents with a chief complaint of mechanical fall (21 Mar 2019 02:47)      OVERNIGHT EVENTS:  Patient had low urine output overnight. Received bumex.     SUBJECTIVE: Patient seen and examined at bedside.     REVIEW OF SYSTEM:  CONSTITUTIONAL: No weakness, fevers or chills  EYES/ENT: No visual changes;  No vertigo or throat pain   NECK: No pain or stiffness  RESPIRATORY: No cough, wheezing, hemoptysis; No shortness of breath  CARDIOVASCULAR: No chest pain or palpitations  GASTROINTESTINAL: No abdominal or epigastric pain. No nausea, vomiting, or hematemesis; No diarrhea or constipation. No melena or hematochezia.  GENITOURINARY: No dysuria, frequency or hematuria  NEUROLOGICAL: No numbness or weakness  SKIN: No itching, rashes    OBJECTIVE:    VITAL SIGNS:  ICU Vital Signs Last 24 Hrs  T(C): 36.8 (21 Mar 2019 03:00), Max: 38.3 (20 Mar 2019 07:47)  T(F): 98.2 (21 Mar 2019 03:00), Max: 100.9 (20 Mar 2019 07:47)  HR: 107 (21 Mar 2019 06:45) (94 - 116)  BP: 103/69 (21 Mar 2019 06:45) (69/48 - 131/105)  BP(mean): 81 (21 Mar 2019 06:45) (53 - 115)  RR: 14 (21 Mar 2019 06:45) (9 - 40)  SpO2: 100% (21 Mar 2019 06:45) (93% - 100%)     @ 07:  -   @ 07:00  --------------------------------------------------------  IN: 2088.7 mL / OUT: 1047 mL / NET: 1041.7 mL    CAPILLARY BLOOD GLUCOSE  POCT Blood Glucose.: 116 mg/dL (21 Mar 2019 00:54)    I/O SUMMARY:  19 @ 07:  -  19 @ 07:00  --------------------------------------------------------  IN: 2088.7 mL / OUT: 1047 mL / NET: 1041.7 mL    PHYSICAL EXAM:  General: NAD  HEENT: NC/AT;, clear conjunctiva  Neck: supple  Respiratory: CTA b/l  Cardiovascular: +S1/S2; RRR  Abdomen: soft, NT/ND; +BS x4  Extremities: 2+ peripheral pulses b/l; no LE edema  Skin: superficial skin tear left distal shin  Neurological: no focal deficits     MEDICATIONS:  MEDICATIONS  (STANDING):  chlorhexidine 4% Liquid 1 Application(s) Topical <User Schedule>  dextrose 5%. 1000 milliLiter(s) (50 mL/Hr) IV Continuous <Continuous>  dextrose 50% Injectable 12.5 Gram(s) IV Push once  dextrose 50% Injectable 25 Gram(s) IV Push once  dextrose 50% Injectable 25 Gram(s) IV Push once  insulin lispro (HumaLOG) corrective regimen sliding scale   SubCutaneous every 6 hours  investigational medication - general 50 milliGRAM(s) IV Push every 6 hours  investigational medication - IVPB 1500 milliGRAM(s) IV Intermittent every 6 hours  meropenem  IVPB 1000 milliGRAM(s) IV Intermittent every 24 hours  milrinone Infusion 0.5 MICROgram(s)/kG/Min (11.985 mL/Hr) IV Continuous <Continuous>  norepinephrine Infusion 0.05 MICROgram(s)/kG/Min (3.745 mL/Hr) IV Continuous <Continuous>  vancomycin  IVPB 1000 milliGRAM(s) IV Intermittent every 12 hours    MEDICATIONS  (PRN):  dextrose 40% Gel 15 Gram(s) Oral once PRN Blood Glucose LESS THAN 70 milliGRAM(s)/deciliter  glucagon  Injectable 1 milliGRAM(s) IntraMuscular once PRN Glucose LESS THAN 70 milligrams/deciliter      ALLERGIES:  Allergies    Isordil (Headache)  penicillin (Rash)    Intolerances        LABS:                        9.9    38.6  )-----------( 225      ( 21 Mar 2019 04:27 )             31.3     03-21    130<L>  |  87<L>  |  32<H>  ----------------------------<  110<H>  3.8   |  26  |  1.38<H>    Ca    7.7<L>      21 Mar 2019 04:27  Phos  4.0       Mg     2.0         TPro  7.0  /  Alb  2.6<L>  /  TBili  7.7<H>  /  DBili  x   /  AST  52<H>  /  ALT  34  /  AlkPhos  416<H>      LIVER FUNCTIONS - ( 21 Mar 2019 04:27 )  Alb: 2.6 g/dL / Pro: 7.0 g/dL / ALK PHOS: 416 U/L / ALT: 34 U/L / AST: 52 U/L / GGT: x           COAGULATION STUDIES:     aPTT  31.7 sec    (19 @ 04:27)     PT     25.6 sec    (19 @ 04:27)     INR    2.17 ratio         (19 @ 04:27), COAGULATION STUDIES:     aPTT  33.5 sec    (19 @ 16:09)     PT     x        (19 @ 16:09)     INR    x             (19 @ 16:09), COAGULATION STUDIES:     aPTT  25.9 sec    (19 @ 08:41)     PT     20.4 sec    (19 @ 08:41)     INR    1.76 ratio         (19 @ 08:41), COAGULATION STUDIES:     aPTT  31.8 sec    (19 @ 06:04)     PT     19.9 sec    (19 @ 06:04)     INR    1.72 ratio         (19 @ 06:04), COAGULATION STUDIES:     aPTT  28.5 sec    (19 @ 01:04)     PT     18.5 sec    (19 @ 01:04)     INR    1.60 ratio         (19 @ 01:04), COAGULATION STUDIES:     aPTT  35.1 sec    (19 @ 20:16)     PT     28.1 sec    (19 @ 20:16)     INR    2.40 ratio         (19 @ 20:16)   PT/INR - ( 21 Mar 2019 04:27 )   PT: 25.6 sec;   INR: 2.17 ratio         PTT - ( 21 Mar 2019 04:27 )  PTT:31.7 sec  Urinalysis Basic - ( 19 Mar 2019 20:28 )    Color: Light Yellow / Appearance: Clear / S.008 / pH: x  Gluc: x / Ketone: Negative  / Bili: Negative / Urobili: Negative   Blood: x / Protein: Negative / Nitrite: Negative   Leuk Esterase: Negative / RBC: x / WBC x   Sq Epi: x / Non Sq Epi: x / Bacteria: x        ABG - ( 20 Mar 2019 08:16 )  pH, Arterial: 7.53  pH, Blood: x     /  pCO2: 35    /  pO2: 80    / HCO3: 29    / Base Excess: 6.1   /  SaO2: 97                POCT Blood Glucose.: 116 mg/dL (19 @ 00:54)  POCT Blood Glucose.: 208 mg/dL (19 @ 18:13)  POCT Blood Glucose.: 163 mg/dL (19 @ 12:39)  POCT Blood Glucose.: 146 mg/dL (19 @ 10:13)    Urinalysis Basic - ( 19 Mar 2019 20:28 )    Color: Light Yellow / Appearance: Clear / S.008 / pH: x  Gluc: x / Ketone: Negative  / Bili: Negative / Urobili: Negative   Blood: x / Protein: Negative / Nitrite: Negative   Leuk Esterase: Negative / RBC: x / WBC x   Sq Epi: x / Non Sq Epi: x / Bacteria: x        MICROBIOLOGY:    Culture - Blood (collected 19 @ 06:14)  Source: .Blood Blood-Peripheral 1 AEROBIC BOTTLE REC&#x27;D  Gram Stain (19 @ 12:19):    Growth in aerobic bottle: Gram Negative Rods  Preliminary Report (19 @ 12:19):    Growth in aerobic bottle: Gram Negative Rods    "Due to technical problems, Proteus sp. will Not be reported as part of    the BCID panel until further notice"    ***Blood Panel PCR results on this specimen are available    approximately 3 hours after the Gram stain result.***    Gram stain, PCR, and/or culture results may not always    correspond due to difference in methodologies.    ************************************************************    This PCR assay was performed using [employer].    The following targets are tested for: Enterococcus,    vancomycin resistant enterococci, Listeria monocytogenes,    coagulase negative staphylococci, S. aureus,    methicillin resistant S. aureus, Streptococcus agalactiae    (Group B), S. pneumoniae, S. pyogenes (Group A),    Acinetobacter baumannii, Enterobacter cloacae, E. coli,    Klebsiella oxytoca, K. pneumoniae, Proteus sp.,    Serratia marcescens, Haemophilus influenzae,    Neisseria meningitidis, Pseudomonas aeruginosa, Candida    albicans, C. glabrata, C krusei, C parapsilosis,    C. tropicalis and the KPC resistance gene.  Organism: Blood Culture PCR (19 @ 14:06)  Organism: Blood Culture PCR (19 @ 14:06)    Culture - Blood (collected 19 @ 02:51)  Source: .Blood PICC Tip  Gram Stain (19 @ 19:10):    Growth in aerobic bottle: Gram Negative Rods  Preliminary Report (19 @ 19:11):    Growth in aerobic bottle: Gram Negative Rods        RADIOLOGY & ADDITIONAL TESTS: Reviewed. PATIENT: DELIA SERRANO   AGE: o     CHIEF COMPLAINT:  Patient is a 63y old  Female who presents with a chief complaint of mechanical fall (21 Mar 2019 02:47)      OVERNIGHT EVENTS:  Patient had low urine output overnight. Received bumex.     SUBJECTIVE: Patient seen and examined at bedside.     REVIEW OF SYSTEM:  CONSTITUTIONAL: No weakness, fevers or chills  EYES/ENT: No visual changes;  No vertigo or throat pain   NECK: No pain or stiffness  RESPIRATORY: No cough, wheezing, hemoptysis; No shortness of breath  CARDIOVASCULAR: No chest pain or palpitations  GASTROINTESTINAL: No abdominal or epigastric pain. No nausea, vomiting, or hematemesis; No diarrhea or constipation.   GENITOURINARY: No dysuria, frequency or hematuria  NEUROLOGICAL: No numbness or weakness  SKIN: No itching, rashes    OBJECTIVE:    VITAL SIGNS:  ICU Vital Signs Last 24 Hrs  T(C): 36.8 (21 Mar 2019 03:00), Max: 38.3 (20 Mar 2019 07:47)  T(F): 98.2 (21 Mar 2019 03:00), Max: 100.9 (20 Mar 2019 07:47)  HR: 107 (21 Mar 2019 06:45) (94 - 116)  BP: 103/69 (21 Mar 2019 06:45) (69/48 - 131/105)  BP(mean): 81 (21 Mar 2019 06:45) (53 - 115)  RR: 14 (21 Mar 2019 06:45) (9 - 40)  SpO2: 100% (21 Mar 2019 06:45) (93% - 100%)     @ 07:  -   @ 07:00  --------------------------------------------------------  IN: 2088.7 mL / OUT: 1047 mL / NET: 1041.7 mL    CAPILLARY BLOOD GLUCOSE  POCT Blood Glucose.: 116 mg/dL (21 Mar 2019 00:54)    I/O SUMMARY:  19 @ 07:01  -  19 @ 07:00  --------------------------------------------------------  IN: 2088.7 mL / OUT: 1047 mL / NET: 1041.7 mL    PHYSICAL EXAM:  General: NAD  HEENT: NC/AT;, clear conjunctiva  Neck: supple  Respiratory: CTA b/l  Cardiovascular: +S1/S2; RRR  Abdomen: soft, NT/ND; +BS x4  Extremities: 2+ peripheral pulses b/l; Trace LE edema  Skin: Staples R temporal area. R line.   Neurological: no focal deficits     MEDICATIONS:  MEDICATIONS  (STANDING):  chlorhexidine 4% Liquid 1 Application(s) Topical <User Schedule>  dextrose 5%. 1000 milliLiter(s) (50 mL/Hr) IV Continuous <Continuous>  dextrose 50% Injectable 12.5 Gram(s) IV Push once  dextrose 50% Injectable 25 Gram(s) IV Push once  dextrose 50% Injectable 25 Gram(s) IV Push once  insulin lispro (HumaLOG) corrective regimen sliding scale   SubCutaneous every 6 hours  investigational medication - general 50 milliGRAM(s) IV Push every 6 hours  investigational medication - IVPB 1500 milliGRAM(s) IV Intermittent every 6 hours  meropenem  IVPB 1000 milliGRAM(s) IV Intermittent every 24 hours  milrinone Infusion 0.5 MICROgram(s)/kG/Min (11.985 mL/Hr) IV Continuous <Continuous>  norepinephrine Infusion 0.05 MICROgram(s)/kG/Min (3.745 mL/Hr) IV Continuous <Continuous>  vancomycin  IVPB 1000 milliGRAM(s) IV Intermittent every 12 hours    MEDICATIONS  (PRN):  dextrose 40% Gel 15 Gram(s) Oral once PRN Blood Glucose LESS THAN 70 milliGRAM(s)/deciliter  glucagon  Injectable 1 milliGRAM(s) IntraMuscular once PRN Glucose LESS THAN 70 milligrams/deciliter      ALLERGIES:  Allergies    Isordil (Headache)  penicillin (Rash)    Intolerances        LABS:                        9.9    38.6  )-----------( 225      ( 21 Mar 2019 04:27 )             31.3     03-21    130<L>  |  87<L>  |  32<H>  ----------------------------<  110<H>  3.8   |  26  |  1.38<H>    Ca    7.7<L>      21 Mar 2019 04:27  Phos  4.0       Mg     2.0         TPro  7.0  /  Alb  2.6<L>  /  TBili  7.7<H>  /  DBili  x   /  AST  52<H>  /  ALT  34  /  AlkPhos  416<H>      LIVER FUNCTIONS - ( 21 Mar 2019 04:27 )  Alb: 2.6 g/dL / Pro: 7.0 g/dL / ALK PHOS: 416 U/L / ALT: 34 U/L / AST: 52 U/L / GGT: x           COAGULATION STUDIES:     aPTT  31.7 sec    (19 @ 04:27)     PT     25.6 sec    (19 @ 04:27)     INR    2.17 ratio         (19 @ 04:27), COAGULATION STUDIES:     aPTT  33.5 sec    (19 @ 16:09)     PT     x        (19 @ 16:09)     INR    x             (19 @ 16:09), COAGULATION STUDIES:     aPTT  25.9 sec    (19 @ 08:41)     PT     20.4 sec    (19 @ 08:41)     INR    1.76 ratio         (19 @ 08:41), COAGULATION STUDIES:     aPTT  31.8 sec    (19 @ 06:04)     PT     19.9 sec    (19 @ 06:04)     INR    1.72 ratio         (19 @ 06:04), COAGULATION STUDIES:     aPTT  28.5 sec    (19 @ 01:04)     PT     18.5 sec    (19 @ 01:04)     INR    1.60 ratio         (19 @ 01:04), COAGULATION STUDIES:     aPTT  35.1 sec    (19 @ 20:16)     PT     28.1 sec    (19 @ 20:16)     INR    2.40 ratio         (19 @ 20:16)   PT/INR - ( 21 Mar 2019 04:27 )   PT: 25.6 sec;   INR: 2.17 ratio         PTT - ( 21 Mar 2019 04:27 )  PTT:31.7 sec  Urinalysis Basic - ( 19 Mar 2019 20:28 )    Color: Light Yellow / Appearance: Clear / S.008 / pH: x  Gluc: x / Ketone: Negative  / Bili: Negative / Urobili: Negative   Blood: x / Protein: Negative / Nitrite: Negative   Leuk Esterase: Negative / RBC: x / WBC x   Sq Epi: x / Non Sq Epi: x / Bacteria: x        ABG - ( 20 Mar 2019 08:16 )  pH, Arterial: 7.53  pH, Blood: x     /  pCO2: 35    /  pO2: 80    / HCO3: 29    / Base Excess: 6.1   /  SaO2: 97                POCT Blood Glucose.: 116 mg/dL (19 @ 00:54)  POCT Blood Glucose.: 208 mg/dL (19 @ 18:13)  POCT Blood Glucose.: 163 mg/dL (19 @ 12:39)  POCT Blood Glucose.: 146 mg/dL (19 @ 10:13)    Urinalysis Basic - ( 19 Mar 2019 20:28 )    Color: Light Yellow / Appearance: Clear / S.008 / pH: x  Gluc: x / Ketone: Negative  / Bili: Negative / Urobili: Negative   Blood: x / Protein: Negative / Nitrite: Negative   Leuk Esterase: Negative / RBC: x / WBC x   Sq Epi: x / Non Sq Epi: x / Bacteria: x        MICROBIOLOGY:    Culture - Blood (collected 19 @ 06:14)  Source: .Blood Blood-Peripheral 1 AEROBIC BOTTLE REC&#x27;D  Gram Stain (19 @ 12:19):    Growth in aerobic bottle: Gram Negative Rods  Preliminary Report (19 @ 12:19):    Growth in aerobic bottle: Gram Negative Rods    "Due to technical problems, Proteus sp. will Not be reported as part of    the BCID panel until further notice"    ***Blood Panel PCR results on this specimen are available    approximately 3 hours after the Gram stain result.***    Gram stain, PCR, and/or culture results may not always    correspond due to difference in methodologies.    ************************************************************    This PCR assay was performed using [employer].    The following targets are tested for: Enterococcus,    vancomycin resistant enterococci, Listeria monocytogenes,    coagulase negative staphylococci, S. aureus,    methicillin resistant S. aureus, Streptococcus agalactiae    (Group B), S. pneumoniae, S. pyogenes (Group A),    Acinetobacter baumannii, Enterobacter cloacae, E. coli,    Klebsiella oxytoca, K. pneumoniae, Proteus sp.,    Serratia marcescens, Haemophilus influenzae,    Neisseria meningitidis, Pseudomonas aeruginosa, Candida    albicans, C. glabrata, C krusei, C parapsilosis,    C. tropicalis and the KPC resistance gene.  Organism: Blood Culture PCR (19 @ 14:06)  Organism: Blood Culture PCR (19 @ 14:06)    Culture - Blood (collected 19 @ 02:51)  Source: .Blood PICC Tip  Gram Stain (19 @ 19:10):    Growth in aerobic bottle: Gram Negative Rods  Preliminary Report (19 @ 19:11):    Growth in aerobic bottle: Gram Negative Rods        RADIOLOGY & ADDITIONAL TESTS: Reviewed. PATIENT: DELIA SERRANO   AGE: o     CHIEF COMPLAINT:  Patient is a 63y old  Female who presents with a chief complaint of mechanical fall (21 Mar 2019 02:47)    OVERNIGHT EVENTS:  Patient had low urine output overnight. Received bumex.     SUBJECTIVE: Patient seen and examined at bedside.     REVIEW OF SYSTEM:  CONSTITUTIONAL: No weakness, fevers or chills  EYES/ENT: No visual changes;  No vertigo or throat pain   NECK: No pain or stiffness  RESPIRATORY: No cough, wheezing, hemoptysis; No shortness of breath  CARDIOVASCULAR: No chest pain or palpitations  GASTROINTESTINAL: No abdominal or epigastric pain. No nausea, vomiting, or hematemesis; No diarrhea or constipation.   GENITOURINARY: No dysuria, frequency or hematuria  NEUROLOGICAL: No numbness or weakness  SKIN: No itching, rashes    OBJECTIVE:    VITAL SIGNS:  ICU Vital Signs Last 24 Hrs  T(C): 36.8 (21 Mar 2019 03:00), Max: 38.3 (20 Mar 2019 07:47)  T(F): 98.2 (21 Mar 2019 03:00), Max: 100.9 (20 Mar 2019 07:47)  HR: 107 (21 Mar 2019 06:45) (94 - 116)  BP: 103/69 (21 Mar 2019 06:45) (69/48 - 131/105)  BP(mean): 81 (21 Mar 2019 06:45) (53 - 115)  RR: 14 (21 Mar 2019 06:45) (9 - 40)  SpO2: 100% (21 Mar 2019 06:45) (93% - 100%)     @ 07:  -   @ 07:00  --------------------------------------------------------  IN: 2088.7 mL / OUT: 1047 mL / NET: 1041.7 mL    CAPILLARY BLOOD GLUCOSE  POCT Blood Glucose.: 116 mg/dL (21 Mar 2019 00:54)    I/O SUMMARY:  19 @ 07:01  -  19 @ 07:00  --------------------------------------------------------  IN: 2088.7 mL / OUT: 1047 mL / NET: 1041.7 mL    PHYSICAL EXAM:  General: NAD  HEENT: NC/AT;, clear conjunctiva  Neck: supple  Respiratory: CTA b/l  Cardiovascular: +S1/S2; RRR  Abdomen: soft, NT/ND; +BS x4  Extremities: 2+ peripheral pulses b/l; Trace LE edema  Skin: Staples R temporal area. R line.   Neurological: no focal deficits     MEDICATIONS:  MEDICATIONS  (STANDING):  chlorhexidine 4% Liquid 1 Application(s) Topical <User Schedule>  dextrose 5%. 1000 milliLiter(s) (50 mL/Hr) IV Continuous <Continuous>  dextrose 50% Injectable 12.5 Gram(s) IV Push once  dextrose 50% Injectable 25 Gram(s) IV Push once  dextrose 50% Injectable 25 Gram(s) IV Push once  insulin lispro (HumaLOG) corrective regimen sliding scale   SubCutaneous every 6 hours  investigational medication - general 50 milliGRAM(s) IV Push every 6 hours  investigational medication - IVPB 1500 milliGRAM(s) IV Intermittent every 6 hours  meropenem  IVPB 1000 milliGRAM(s) IV Intermittent every 24 hours  milrinone Infusion 0.5 MICROgram(s)/kG/Min (11.985 mL/Hr) IV Continuous <Continuous>  norepinephrine Infusion 0.05 MICROgram(s)/kG/Min (3.745 mL/Hr) IV Continuous <Continuous>  vancomycin  IVPB 1000 milliGRAM(s) IV Intermittent every 12 hours    MEDICATIONS  (PRN):  dextrose 40% Gel 15 Gram(s) Oral once PRN Blood Glucose LESS THAN 70 milliGRAM(s)/deciliter  glucagon  Injectable 1 milliGRAM(s) IntraMuscular once PRN Glucose LESS THAN 70 milligrams/deciliter    ALLERGIES:  Allergies  Isordil (Headache)  penicillin (Rash)  Intolerances    LABS:                        9.9    38.6  )-----------( 225      ( 21 Mar 2019 04:27 )             31.3     03-21    130<L>  |  87<L>  |  32<H>  ----------------------------<  110<H>  3.8   |  26  |  1.38<H>    Ca    7.7<L>      21 Mar 2019 04:27  Phos  4.0       Mg     2.0         TPro  7.0  /  Alb  2.6<L>  /  TBili  7.7<H>  /  DBili  x   /  AST  52<H>  /  ALT  34  /  AlkPhos  416<H>      LIVER FUNCTIONS - ( 21 Mar 2019 04:27 )  Alb: 2.6 g/dL / Pro: 7.0 g/dL / ALK PHOS: 416 U/L / ALT: 34 U/L / AST: 52 U/L / GGT: x           COAGULATION STUDIES:     aPTT  31.7 sec    (19 @ 04:27)     PT     25.6 sec    (19 @ 04:27)     INR    2.17 ratio         (19 @ 04:27), COAGULATION STUDIES:     aPTT  33.5 sec    (19 @ 16:09)     PT     x        (19 @ 16:09)     INR    x             (19 @ 16:09), COAGULATION STUDIES:     aPTT  25.9 sec    (19 @ 08:41)     PT     20.4 sec    (19 @ 08:41)     INR    1.76 ratio         (19 @ 08:41), COAGULATION STUDIES:     aPTT  31.8 sec    (19 @ 06:04)     PT     19.9 sec    (19 @ 06:04)     INR    1.72 ratio         (19 @ 06:04), COAGULATION STUDIES:     aPTT  28.5 sec    (19 @ 01:04)     PT     18.5 sec    (19 @ 01:04)     INR    1.60 ratio         (19 @ 01:04), COAGULATION STUDIES:     aPTT  35.1 sec    (19 @ 20:16)     PT     28.1 sec    (19 @ 20:16)     INR    2.40 ratio         (19 @ 20:16)   PT/INR - ( 21 Mar 2019 04:27 )   PT: 25.6 sec;   INR: 2.17 ratio    PTT - ( 21 Mar 2019 04:27 )  PTT:31.7 sec    Urinalysis Basic - ( 19 Mar 2019 20:28 )  Color: Light Yellow / Appearance: Clear / S.008 / pH: x  Gluc: x / Ketone: Negative  / Bili: Negative / Urobili: Negative   Blood: x / Protein: Negative / Nitrite: Negative   Leuk Esterase: Negative / RBC: x / WBC x   Sq Epi: x / Non Sq Epi: x / Bacteria: x    ABG - ( 20 Mar 2019 08:16 )  pH, Arterial: 7.53  pH, Blood: x     /  pCO2: 35    /  pO2: 80    / HCO3: 29    / Base Excess: 6.1   /  SaO2: 97        POCT Blood Glucose.: 116 mg/dL (19 @ 00:54)  POCT Blood Glucose.: 208 mg/dL (19 @ 18:13)  POCT Blood Glucose.: 163 mg/dL (19 @ 12:39)  POCT Blood Glucose.: 146 mg/dL (19 @ 10:13)    Urinalysis Basic - ( 19 Mar 2019 20:28 )  Color: Light Yellow / Appearance: Clear / S.008 / pH: x  Gluc: x / Ketone: Negative  / Bili: Negative / Urobili: Negative   Blood: x / Protein: Negative / Nitrite: Negative   Leuk Esterase: Negative / RBC: x / WBC x   Sq Epi: x / Non Sq Epi: x / Bacteria: x    Culture - Blood (collected 19 @ 06:14)  Source: .Blood Blood-Peripheral 1 AEROBIC BOTTLE REC&#x27;D  Gram Stain (19 @ 12:19):    Growth in aerobic bottle: Gram Negative Rods  Preliminary Report (19 @ 12:19):    Growth in aerobic bottle: Gram Negative Rods    "Due to technical problems, Proteus sp. will Not be reported as part of    the BCID panel until further notice"    ***Blood Panel PCR results on this specimen are available    approximately 3 hours after the Gram stain result.***    Gram stain, PCR, and/or culture results may not always    correspond due to difference in methodologies.    ************************************************************    This PCR assay was performed using [employer].    The following targets are tested for: Enterococcus,    vancomycin resistant enterococci, Listeria monocytogenes,    coagulase negative staphylococci, S. aureus,    methicillin resistant S. aureus, Streptococcus agalactiae    (Group B), S. pneumoniae, S. pyogenes (Group A),    Acinetobacter baumannii, Enterobacter cloacae, E. coli,    Klebsiella oxytoca, K. pneumoniae, Proteus sp.,    Serratia marcescens, Haemophilus influenzae,    Neisseria meningitidis, Pseudomonas aeruginosa, Candida    albicans, C. glabrata, C krusei, C parapsilosis,    C. tropicalis and the KPC resistance gene.  Organism: Blood Culture PCR (19 @ 14:06)  Organism: Blood Culture PCR (19 @ 14:06)    Culture - Blood (collected 19 @ 02:51)  Source: .Blood PICC Tip  Gram Stain (19 @ 19:10):    Growth in aerobic bottle: Gram Negative Rods  Preliminary Report (19 @ 19:11):    Growth in aerobic bottle: Gram Negative Rods        RADIOLOGY & ADDITIONAL TESTS: Reviewed.    < from: CT Chest w/ IV Cont (19 @ 21:55) >  CHEST:     LUNGS AND LARGE AIRWAYS: Patent central airways. Persistent groundglass   opacities in the right middle and right lower lobes. Subsegmental   bibasilar atelectasis.  PLEURA: No pleural effusion or pneumatosis.  VESSELS: No contrast extravasation or perivascular hematoma.  HEART: Stable cardiomegaly. No pericardial effusion. Left chest wall AICD   pacemaker.  MEDIASTINUM AND FLORINDA: No lymphadenopathy.  CHEST WALL AND LOWER NECK: Left subclavian approach AICD pacemaker.    ABDOMEN AND PELVIS:    LIVER: Within normal limits.  BILE DUCTS: Normal caliber.  GALLBLADDER: Cholelithiasis.  SPLEEN: Within normal limits.  PANCREAS: Within normal limits.    ADRENALS: Within normal limits.  KIDNEYS/URETERS: No hydronephrosis, hydroureter or significant   perinephric stranding.  BLADDER: Within normal limits.  REPRODUCTIVE ORGANS: Postsurgical changes in the uterus. No adnexal mass.    BOWEL: No bowel obstruction. Unremarkable appendix. Colon diverticulosis.   No significant bowel wall thickening or inflammatory change.  PERITONEUM: No drainable fluid collection or free air.  VESSELS: Atherosclerotic change of the abdominal aorta and its branches.   No contrast extravasation or perivascular hematoma.  RETROPERITONEUM: No lymphadenopathy.      ABDOMINAL WALL: Small fat-containing umbilical hernia.    BONES/THORACIC SPINE: Transitional lumbosacral anatomy. Degenerative   changes of the spine. No obvious acute fracture or traumatic malalignment.    IMPRESSION:     No obvious acute intrathoracic, intra-abdominal or pelvic solid oval,   visceral or vascular injury, given the extent of artifact. No obvious   acute fracture or traumatic malalignment.    Groundglass opacities in the right middle and right lower lobes, which   were noted on 2018. Differential diagnosis includes pulmonary edema,   infection and inflammation although neoplasm cannot be excluded.   Recommend follow-up. PATIENT: DELIA SERRANO   AGE: o     CHIEF COMPLAINT:  Patient is a 63y old  Female who presents with a chief complaint of mechanical fall (21 Mar 2019 02:47)    OVERNIGHT EVENTS:  Patient had low urine output overnight. Received bumex.     SUBJECTIVE: Patient seen and examined at bedside.     REVIEW OF SYSTEM:  CONSTITUTIONAL: No weakness, fevers or chills  EYES/ENT: No visual changes;  No vertigo or throat pain   NECK: No pain or stiffness  RESPIRATORY: No cough, wheezing, hemoptysis; No shortness of breath  CARDIOVASCULAR: No chest pain or palpitations  GASTROINTESTINAL: No abdominal or epigastric pain. No nausea, vomiting, or hematemesis; No diarrhea or constipation.   GENITOURINARY: No dysuria, frequency or hematuria  NEUROLOGICAL: No numbness or weakness  SKIN: No itching, rashes    OBJECTIVE:    VITAL SIGNS:  ICU Vital Signs Last 24 Hrs  T(C): 36.8 (21 Mar 2019 03:00), Max: 38.3 (20 Mar 2019 07:47)  T(F): 98.2 (21 Mar 2019 03:00), Max: 100.9 (20 Mar 2019 07:47)  HR: 107 (21 Mar 2019 06:45) (94 - 116)  BP: 103/69 (21 Mar 2019 06:45) (69/48 - 131/105)  BP(mean): 81 (21 Mar 2019 06:45) (53 - 115)  RR: 14 (21 Mar 2019 06:45) (9 - 40)  SpO2: 100% (21 Mar 2019 06:45) (93% - 100%)     @ 07:  -   @ 07:00  --------------------------------------------------------  IN: 2088.7 mL / OUT: 1047 mL / NET: 1041.7 mL    CAPILLARY BLOOD GLUCOSE  POCT Blood Glucose.: 116 mg/dL (21 Mar 2019 00:54)    I/O SUMMARY:  19 @ 07:01  -  19 @ 07:00  --------------------------------------------------------  IN: 2088.7 mL / OUT: 1047 mL / NET: 1041.7 mL    PHYSICAL EXAM:  General: NAD  HEENT: NC/AT;, clear conjunctiva  Neck: supple  Respiratory: CTA b/l  Cardiovascular: +S1/S2; RRR  Abdomen: soft, NT/ND; +BS x4  Extremities: 2+ peripheral pulses b/l; Trace LE edema  Skin: Staples R temporal area staples. R IJ line.   Neurological: no focal deficits     MEDICATIONS:  MEDICATIONS  (STANDING):  chlorhexidine 4% Liquid 1 Application(s) Topical <User Schedule>  dextrose 5%. 1000 milliLiter(s) (50 mL/Hr) IV Continuous <Continuous>  dextrose 50% Injectable 12.5 Gram(s) IV Push once  dextrose 50% Injectable 25 Gram(s) IV Push once  dextrose 50% Injectable 25 Gram(s) IV Push once  insulin lispro (HumaLOG) corrective regimen sliding scale   SubCutaneous every 6 hours  investigational medication - general 50 milliGRAM(s) IV Push every 6 hours  investigational medication - IVPB 1500 milliGRAM(s) IV Intermittent every 6 hours  meropenem  IVPB 1000 milliGRAM(s) IV Intermittent every 24 hours  milrinone Infusion 0.5 MICROgram(s)/kG/Min (11.985 mL/Hr) IV Continuous <Continuous>  norepinephrine Infusion 0.05 MICROgram(s)/kG/Min (3.745 mL/Hr) IV Continuous <Continuous>  vancomycin  IVPB 1000 milliGRAM(s) IV Intermittent every 12 hours    MEDICATIONS  (PRN):  dextrose 40% Gel 15 Gram(s) Oral once PRN Blood Glucose LESS THAN 70 milliGRAM(s)/deciliter  glucagon  Injectable 1 milliGRAM(s) IntraMuscular once PRN Glucose LESS THAN 70 milligrams/deciliter    ALLERGIES:  Allergies  Isordil (Headache)  penicillin (Rash)  Intolerances    LABS:                        9.9    38.6  )-----------( 225      ( 21 Mar 2019 04:27 )             31.3     03-21    130<L>  |  87<L>  |  32<H>  ----------------------------<  110<H>  3.8   |  26  |  1.38<H>    Ca    7.7<L>      21 Mar 2019 04:27  Phos  4.0       Mg     2.0         TPro  7.0  /  Alb  2.6<L>  /  TBili  7.7<H>  /  DBili  x   /  AST  52<H>  /  ALT  34  /  AlkPhos  416<H>      LIVER FUNCTIONS - ( 21 Mar 2019 04:27 )  Alb: 2.6 g/dL / Pro: 7.0 g/dL / ALK PHOS: 416 U/L / ALT: 34 U/L / AST: 52 U/L / GGT: x           COAGULATION STUDIES:     aPTT  31.7 sec    (19 @ 04:27)     PT     25.6 sec    (19 @ 04:27)     INR    2.17 ratio         (19 @ 04:27), COAGULATION STUDIES:     aPTT  33.5 sec    (19 @ 16:09)     PT     x        (19 @ 16:09)     INR    x             (19 @ 16:09), COAGULATION STUDIES:     aPTT  25.9 sec    (19 @ 08:41)     PT     20.4 sec    (19 @ 08:41)     INR    1.76 ratio         (19 @ 08:41), COAGULATION STUDIES:     aPTT  31.8 sec    (19 @ 06:04)     PT     19.9 sec    (19 @ 06:04)     INR    1.72 ratio         (19 @ 06:04), COAGULATION STUDIES:     aPTT  28.5 sec    (19 @ 01:04)     PT     18.5 sec    (19 @ 01:04)     INR    1.60 ratio         (19 @ 01:04), COAGULATION STUDIES:     aPTT  35.1 sec    (19 @ 20:16)     PT     28.1 sec    (19 @ 20:16)     INR    2.40 ratio         (19 @ 20:16)   PT/INR - ( 21 Mar 2019 04:27 )   PT: 25.6 sec;   INR: 2.17 ratio    PTT - ( 21 Mar 2019 04:27 )  PTT:31.7 sec    Urinalysis Basic - ( 19 Mar 2019 20:28 )  Color: Light Yellow / Appearance: Clear / S.008 / pH: x  Gluc: x / Ketone: Negative  / Bili: Negative / Urobili: Negative   Blood: x / Protein: Negative / Nitrite: Negative   Leuk Esterase: Negative / RBC: x / WBC x   Sq Epi: x / Non Sq Epi: x / Bacteria: x    ABG - ( 20 Mar 2019 08:16 )  pH, Arterial: 7.53  pH, Blood: x     /  pCO2: 35    /  pO2: 80    / HCO3: 29    / Base Excess: 6.1   /  SaO2: 97        POCT Blood Glucose.: 116 mg/dL (19 @ 00:54)  POCT Blood Glucose.: 208 mg/dL (19 @ 18:13)  POCT Blood Glucose.: 163 mg/dL (19 @ 12:39)  POCT Blood Glucose.: 146 mg/dL (19 @ 10:13)    Urinalysis Basic - ( 19 Mar 2019 20:28 )  Color: Light Yellow / Appearance: Clear / S.008 / pH: x  Gluc: x / Ketone: Negative  / Bili: Negative / Urobili: Negative   Blood: x / Protein: Negative / Nitrite: Negative   Leuk Esterase: Negative / RBC: x / WBC x   Sq Epi: x / Non Sq Epi: x / Bacteria: x    Culture - Blood (collected 19 @ 06:14)  Source: .Blood Blood-Peripheral 1 AEROBIC BOTTLE REC&#x27;D  Gram Stain (19 @ 12:19):    Growth in aerobic bottle: Gram Negative Rods  Preliminary Report (19 @ 12:19):    Growth in aerobic bottle: Gram Negative Rods    "Due to technical problems, Proteus sp. will Not be reported as part of    the BCID panel until further notice"    ***Blood Panel PCR results on this specimen are available    approximately 3 hours after the Gram stain result.***    Gram stain, PCR, and/or culture results may not always    correspond due to difference in methodologies.    ************************************************************    This PCR assay was performed using [employer].    The following targets are tested for: Enterococcus,    vancomycin resistant enterococci, Listeria monocytogenes,    coagulase negative staphylococci, S. aureus,    methicillin resistant S. aureus, Streptococcus agalactiae    (Group B), S. pneumoniae, S. pyogenes (Group A),    Acinetobacter baumannii, Enterobacter cloacae, E. coli,    Klebsiella oxytoca, K. pneumoniae, Proteus sp.,    Serratia marcescens, Haemophilus influenzae,    Neisseria meningitidis, Pseudomonas aeruginosa, Candida    albicans, C. glabrata, C krusei, C parapsilosis,    C. tropicalis and the KPC resistance gene.  Organism: Blood Culture PCR (19 @ 14:06)  Organism: Blood Culture PCR (19 @ 14:06)    Culture - Blood (collected 19 @ 02:51)  Source: .Blood PICC Tip  Gram Stain (19 @ 19:10):    Growth in aerobic bottle: Gram Negative Rods  Preliminary Report (19 @ 19:11):    Growth in aerobic bottle: Gram Negative Rods        RADIOLOGY & ADDITIONAL TESTS: Reviewed.    < from: CT Chest w/ IV Cont (19 @ 21:55) >  CHEST:     LUNGS AND LARGE AIRWAYS: Patent central airways. Persistent groundglass   opacities in the right middle and right lower lobes. Subsegmental   bibasilar atelectasis.  PLEURA: No pleural effusion or pneumatosis.  VESSELS: No contrast extravasation or perivascular hematoma.  HEART: Stable cardiomegaly. No pericardial effusion. Left chest wall AICD   pacemaker.  MEDIASTINUM AND FLORINDA: No lymphadenopathy.  CHEST WALL AND LOWER NECK: Left subclavian approach AICD pacemaker.    ABDOMEN AND PELVIS:    LIVER: Within normal limits.  BILE DUCTS: Normal caliber.  GALLBLADDER: Cholelithiasis.  SPLEEN: Within normal limits.  PANCREAS: Within normal limits.    ADRENALS: Within normal limits.  KIDNEYS/URETERS: No hydronephrosis, hydroureter or significant   perinephric stranding.  BLADDER: Within normal limits.  REPRODUCTIVE ORGANS: Postsurgical changes in the uterus. No adnexal mass.    BOWEL: No bowel obstruction. Unremarkable appendix. Colon diverticulosis.   No significant bowel wall thickening or inflammatory change.  PERITONEUM: No drainable fluid collection or free air.  VESSELS: Atherosclerotic change of the abdominal aorta and its branches.   No contrast extravasation or perivascular hematoma.  RETROPERITONEUM: No lymphadenopathy.      ABDOMINAL WALL: Small fat-containing umbilical hernia.    BONES/THORACIC SPINE: Transitional lumbosacral anatomy. Degenerative   changes of the spine. No obvious acute fracture or traumatic malalignment.    IMPRESSION:     No obvious acute intrathoracic, intra-abdominal or pelvic solid oval,   visceral or vascular injury, given the extent of artifact. No obvious   acute fracture or traumatic malalignment.    Groundglass opacities in the right middle and right lower lobes, which   were noted on 2018. Differential diagnosis includes pulmonary edema,   infection and inflammation although neoplasm cannot be excluded.   Recommend follow-up.

## 2019-03-21 NOTE — PROGRESS NOTE ADULT - ASSESSMENT
64 yo F h/o severe cardiomyopathy c/b CHF w EF 10% s/p AICD on home milrinone infusion via PICC, afib on aspirin & elliquis, DM2, HTN, thyroid cancer s/p thyroidectomy presents for mechanical fall, found to have SDH, admitted to MICU for multifactorial shock likely cardiogenic and septic shock i/s/o possible PNA.    Mechanical Fall with SDH   - SDH stable on repeat CT head. S/p kaycentra for elevated INR 1.7.  - serial head CT  - frequent neuro checks  - appreciate neurosurgery recs    Severe cardiomyopathy c/b CHF s/p AICD on home milrinone infusion via PICC with Shock ; Multifactorial shock w cardiogenic & septic shock i/s/o PNA. Prolonged QTc 602 ms. Troponin 37.  - amiodarone 150 mg stat  - c/w norepinephrine gtt, titrate to maintain MAP > 65  - c/w milrinone gtt  - TTE    Sepsis  with Bacteremia : -ID helping.   - c/w vancomycin 1g q12h & meropenum      Hypokalemia & hypomagnesemia. Cr stable. Renal helping.   - monitor BMP, replete for K > 4, Mg > 2    Congestive hepatopathy : Sec to CHF .Abd mildly distended, non-tender. Cholelithiasis on prior US. Lipase 140. Ammonia wnl.   - Stable.     DM2  - ISS q6h  Thyroid cancer s/p thyroidectomy  - on synthroid     - DVT ppx: SCDs, hold pharmacological ppx  - tx SDH as above

## 2019-03-21 NOTE — PROGRESS NOTE ADULT - SUBJECTIVE AND OBJECTIVE BOX
Patient is a 63y old  Female who presents with a chief complaint of mechanical fall (21 Mar 2019 12:24)    Being followed by ID for        Interval history:  No other acute events      ROS:  No cough,SOB,CP  No N/V/D  No abd pain  No urinary complaints  No HA  No joint or limb pain  No other complaints    PAST MEDICAL & SURGICAL HISTORY:  Asthma  CHF (congestive heart failure): with EF of 10% s/p AICD  DM (diabetes mellitus)  HTN (hypertension)  Thyroid ca  AICD (automatic cardioverter/defibrillator) present  S/P thyroidectomy    Allergies    Isordil (Headache)  penicillin (Rash)    Intolerances      Antimicrobials:    meropenem  IVPB 1000 milliGRAM(s) IV Intermittent every 12 hours  rifaximin 550 milliGRAM(s) Oral two times a day  vancomycin  IVPB 1000 milliGRAM(s) IV Intermittent every 12 hours    MEDICATIONS  (STANDING):  chlorhexidine 4% Liquid 1 Application(s) Topical <User Schedule>  dextrose 5%. 1000 milliLiter(s) (50 mL/Hr) IV Continuous <Continuous>  dextrose 50% Injectable 12.5 Gram(s) IV Push once  dextrose 50% Injectable 25 Gram(s) IV Push once  dextrose 50% Injectable 25 Gram(s) IV Push once  insulin lispro (HumaLOG) corrective regimen sliding scale   SubCutaneous three times a day before meals  insulin lispro (HumaLOG) corrective regimen sliding scale   SubCutaneous at bedtime  investigational medication - general 50 milliGRAM(s) IV Push every 6 hours  investigational medication - IVPB 1500 milliGRAM(s) IV Intermittent every 6 hours  meropenem  IVPB 1000 milliGRAM(s) IV Intermittent every 12 hours  milrinone Infusion 0.5 MICROgram(s)/kG/Min (11.985 mL/Hr) IV Continuous <Continuous>  norepinephrine Infusion 0.05 MICROgram(s)/kG/Min (3.745 mL/Hr) IV Continuous <Continuous>  phytonadione  IVPB 10 milliGRAM(s) IV Intermittent daily  potassium chloride    Tablet ER 20 milliEquivalent(s) Oral every 2 hours  rifaximin 550 milliGRAM(s) Oral two times a day  vancomycin  IVPB 1000 milliGRAM(s) IV Intermittent every 12 hours      Vital Signs Last 24 Hrs  T(C): 36.8 (19 @ 11:00), Max: 36.8 (19 @ 03:00)  T(F): 98.3 (19 @ 11:00), Max: 98.3 (19 @ 11:00)  HR: 106 (19 @ 13:15) (97 - 116)  BP: 105/64 (19 @ 13:15) (86/58 - 131/105)  BP(mean): 79 (19 @ 13:15) (67 - 115)  RR: 18 (19 @ 13:15) (9 - 40)  SpO2: 98% (19 @ 13:15) (93% - 100%)    Physical Exam:    Constitutional well preserved,comfortable,pleasant    HEENT PERRLA EOMI,No pallor or icterus    No oral exudate or erythema    Neck supple no JVD or LN    Chest Good AE,CTA    CVS RRR S1 S2 WNl No murmur or rub or gallop    Abd soft BS normal No tenderness     Ext No cyanosis clubbing or edema    IV site no erythema tenderness or discharge    Joints no swelling or LOM    CNS AAO X 3 no focal    Lab Data:                          9.9    38.6  )-----------( 225      ( 21 Mar 2019 04:27 )             31.3           128<L>  |  83<L>  |  30<H>  ----------------------------<  255<H>  3.4<L>   |  29  |  1.19    Ca    7.9<L>      21 Mar 2019 12:03  Phos  4.0       Mg     2.0         TPro  7.7  /  Alb  3.1<L>  /  TBili  8.4<H>  /  DBili  x   /  AST  49<H>  /  ALT  36  /  AlkPhos  419<H>        Urinalysis Basic - ( 19 Mar 2019 20:28 )    Color: Light Yellow / Appearance: Clear / S.008 / pH: x  Gluc: x / Ketone: Negative  / Bili: Negative / Urobili: Negative   Blood: x / Protein: Negative / Nitrite: Negative   Leuk Esterase: Negative / RBC: x / WBC x   Sq Epi: x / Non Sq Epi: x / Bacteria: x        .Blood Blood-Peripheral 1 AEROBIC BOTTLE REC'D  19   Growth in aerobic bottle: Gram Negative Rods . Identification and  susceptibility to follow.  "Due to technical problems, Proteus sp. will Not be reported as part of  the BCID panel until further notice"  ***Blood Panel PCR results on this specimenare available  approximately 3 hours after the Gram stain result.***  Gram stain, PCR, and/or culture results may not always  correspond due to difference in methodologies.  ************************************************************  This PCR assaywas performed using [employer].  The following targets are tested for: Enterococcus,  vancomycin resistant enterococci, Listeria monocytogenes,  coagulase negative staphylococci, S. aureus,  methicillin resistant S. aureus, Streptococcus agalactiae  (Group B), S. pneumoniae, S. pyogenes (Group A),  Acinetobacter baumannii, Enterobacter cloacae, E. coli,  Klebsiella oxytoca, K. pneumoniae, Proteus sp.,  Serratia marcescens, Haemophilus influenzae,  Neisseria meningitidis, Pseudomonas aeruginosa, Candida  albicans, C. glabrata, C krusei, C parapsilosis,  C. tropicalis and the KPC resistance gene.  --  Blood Culture PCR      .Blood PICC Tip  19   Growth in aerobic bottle: Gram Negative Rods  --    Growth in aerobic bottle: Gram Negative Rods        WBC Count: 38.6 (19 @ 04:27)  WBC Count: 56.2 (19 @ 17:51)  WBC Count: 61.0 (19 @ 16:09)  WBC Count: 14.64 (19 @ 08:44)  WBC Count: 27.0 (19 @ 08:41)  WBC Count: 20.8 (19 @ 20:16)      < from: CT Chest w/ IV Cont (19 @ 21:55) >    EXAM:  CT REFORM SPINE T                          EXAM:  CT ABDOMEN AND PELVIS IC                          EXAM:  CT CHEST IC                            PROCEDURE DATE:  2019            INTERPRETATION:  CLINICAL INFORMATION: Trauma. Fall down14 steps.    COMPARISON: CT abdomen/pelvis 2018. Chest CTA 2014.    PROCEDURE:   CT of the Chest, Abdomen and Pelvis was performed with intravenous   contrast.   Imaging was performed through the chest in the arterial phase followed by   imaging of the abdomen and pelvis in the portal venous phase.  Intravenous contrast: 90 ml Omnipaque 350. 10 ml discarded.  Oral contrast:None.  Sagittal and coronal reformats were performed.  Axial, coronal, and sagittal reformats of the thoracic spinewere   obtained from the CT of the chest/abdomen/pelvis.     FINDINGS: Artifact from the patient's arms and AICD/pacemaker degrades   images.    CHEST:     LUNGS AND LARGE AIRWAYS: Patent central airways. Persistent groundglass   opacities in the right middle and right lower lobes. Subsegmental   bibasilar atelectasis.  PLEURA: No pleural effusion or pneumatosis.  VESSELS: No contrast extravasation or perivascular hematoma.  HEART: Stable cardiomegaly. No pericardial effusion. Left chest wall AICD   pacemaker.  MEDIASTINUM AND FLORINDA: No lymphadenopathy.  CHEST WALL AND LOWER NECK: Left subclavian approach AICD pacemaker.    ABDOMEN AND PELVIS:    LIVER: Within normal limits.  BILE DUCTS: Normal caliber.  GALLBLADDER: Cholelithiasis.  SPLEEN: Within normal limits.  PANCREAS: Within normal limits.    ADRENALS: Within normal limits.  KIDNEYS/URETERS: No hydronephrosis, hydroureter or significant   perinephric stranding.  BLADDER: Within normal limits.  REPRODUCTIVE ORGANS: Postsurgical changes in the uterus. No adnexal mass.    BOWEL: No bowel obstruction. Unremarkable appendix. Colon diverticulosis.   No significant bowel wall thickening or inflammatory change.  PERITONEUM: No drainable fluid collection or free air.  VESSELS: Atherosclerotic change of the abdominal aorta and its branches.   No contrast extravasation or perivascular hematoma.  RETROPERITONEUM: No lymphadenopathy.      ABDOMINAL WALL: Small fat-containing umbilical hernia.    BONES/THORACIC SPINE: Transitional lumbosacral anatomy. Degenerative   changes of the spine. No obvious acute fracture or traumatic malalignment.    IMPRESSION:     No obvious acute intrathoracic, intra-abdominal or pelvic solid oval,   visceral or vascular injury, given the extent of artifact. No obvious   acute fracture or traumatic malalignment.    Groundglass opacities in the right middle and right lower lobes, which   were noted on 2018. Differential diagnosis includes pulmonary edema,   infection and inflammation although neoplasm cannot be excluded.   Recommend follow-up.    Additional findings as described.          KIKE BERG D.O., RADIOLOGY RESIDENT  This document has been electronically signed.  HARLEEN BRUNO M.D., ATTENDING RADIOLOGIST  This document has beenelectronically signed. Mar 20 2019 12:42AM              < end of copied text >

## 2019-03-21 NOTE — PROGRESS NOTE ADULT - ASSESSMENT
62 yo F h/o severe cardiomyopathy c/b CHF w EF 10% s/p AICD on home milrinone infusion via PICC, afib on aspirin & elliquis, DM2, HTN, thyroid cancer s/p thyroidectomy presents for mechanical fall, found to have SDH, admitted to MICU for multifactorial shock likely cardiogenic and septic shock possible PNA on levophed. Patient found with klebsiella bacteremia likely from catheter.     #Neuro  SDH s/p mechanical fall with head trauma i/s/o elliquis, last dose yesterday morning. Currently lethargic but arousable A&Ox3 w/o focal deficits, likely also component of acute metabolic encephalopathy i/s/o septic shock. Ammonia wnl. SDH stable on repeat CT head. S/p kaycentra for elevated INR 1.7. s/p K-centra and IV vitamin K.   - serial head CT  - frequent neuro checks  - appreciate neurosurgery recs    #CV  Severe cardiomyopathy c/b CHF s/p AICD on home milrinone infusion via PICC.  TTE from November 2018 w LVEF 10%.  Afib on aspirin & elliquis. Course c/b Vtach x 7 beats.  Multifactorial shock w cardiogenic & septic shock i/s/o PNA. Prolonged QTc 602 ms. Troponin 37.  - amniodarone 150 mg stat  - c/w norepinephrine gtt, titrate to maintain MAP > 65  - c/w milrinone gtt  - TTE  - EP consult for AICD interrogation  - HF consult for diuresis & optimization  - trend troponins  - send thyroid studies  - hold elliquis & aspirin i/s/o SDH  - monitor telemetry    #Pulm  CT chest w groundglass opacities consistent w PNA vs pulm edema vs malignancy. Satting well on room air.  - f/u final read of chest CT  - POCUS this am for further eval  - tx for possible PNA as below    #ID  Septic shock w fever 38.8, WBC 20.8, CT chest w groundglass opacities in RML & RLL consistent w PNA vs pulm edema vs malignancy, RVP, & UA negative for infection. Treating empirically for possible HCAP though low suspicion, will also cover for possible PICC line infection.  - c/w vancomycin 1g q12h & monitor vanc trough  - c/w aztronam 1g q8h  - c/w azithromycin 500 mg qD  - start metronidazole 500 mg q8h --> update: plan to D/C  - send Ulegionella & pro-calcitonin  - send PICC Cx & remove PICC line.  - consider LP  - f/u BCx    #Renal  Hypokalemia & hypomagnesemia. Cr stable.  - monitor BMP, replete for K > 4, Mg > 2    #GI  Congestive hepatopathy. Abd mildly distended, non-tender. Cholelithiasis on prior US. Lipase 140. Ammonia wnl.   - NPO for now, advance as tolerated  - consider abd US if no clear source of infection    #Endo  DM2  - ISS q6h while NPO  Thyroid cancer s/p thyroidectomy  - send thyroid studies    #MSK  Mechanical fall down stairs w head trauma & LOC, found to have SDH, no other evidence of trauma on imaging.  - PT consult when improved  - appreciate trauma surgery recs    #Heme  - DVT ppx: SCDs, hold pharmacological ppx  - tx SDH as above    #GOC  - Full code confirmed with pt 62 yo F h/o severe cardiomyopathy c/b CHF w EF 10% s/p AICD on home milrinone infusion via PICC, afib on aspirin & elliquis, DM2, HTN, thyroid cancer s/p thyroidectomy presents for mechanical fall, found to have SDH, admitted to MICU for multifactorial shock likely cardiogenic and septic shock possible PNA on levophed. Patient found with klebsiella bacteremia likely from catheter.     #Neuro  SDH s/p mechanical fall with head trauma i/s/o elliquis, last dose yesterday morning. Currently lethargic but arousable A&Ox3 w/o focal deficits, likely also component of acute metabolic encephalopathy i/s/o septic shock. Ammonia wnl. SDH stable on repeat CT head. S/p kaycentra gave IV vitamin K.   - frequent neuro checks  - appreciate neurosurgery recs  - CT head if any mental status change.     #CV  Severe cardiomyopathy c/b CHF s/p AICD on home milrinone infusion via PICC.  TTE from November 2018 w LVEF 10%.  Afib on aspirin & elliquis. Course c/b Vtach x 7 beats. Multifactorial shock w cardiogenic & septic shock i/s/o PNA. Prolonged QTc 602 ms. Troponin 37.  - c/w norepinephrine gtt, titrate to maintain MAP > 65  - c/w milrinone gtt  - TTE  - EP consult for AICD interrogation  - HF consult for diuresis & optimization  - trend troponins  - send thyroid studies  - hold elliquis & aspirin i/s/o SDH  - monitor telemetry    #Pulm  CT chest w groundglass opacities consistent w PNA vs pulm edema vs malignancy. Satting well on room air.  - f/u final read of chest CT  - POCUS this am for further eval  - tx for possible PNA as below    #ID  Septic shock w fever 38.8, WBC 20.8, CT chest w groundglass opacities in RML & RLL consistent w PNA vs pulm edema vs malignancy, RVP, & UA negative for infection. Treating empirically for possible HCAP though low suspicion, will also cover for possible PICC line infection.  - Blood culture positive for klebsiella.   - c/w vancomycin 1g q12h & monitor vanc trough  - c/w meropenem  - PICC line tip culture sent.   #Renal  Hypokalemia & hypomagnesemia. Cr stable.  - monitor BMP, replete for K > 4, Mg > 2    #GI  Congestive hepatopathy. Abd mildly distended, non-tender. Cholelithiasis on prior US. Lipase 140. Ammonia wnl.   - NPO for now, advance as tolerated  - consider abd US if no clear source of infection    #Endo  DM2  - ISS q6h while NPO  Thyroid cancer s/p thyroidectomy  - send thyroid studies    #MSK  Mechanical fall down stairs w head trauma & LOC, found to have SDH, no other evidence of trauma on imaging.  - PT consult when improved  - appreciate trauma surgery recs    #Heme  - DVT ppx: SCDs, hold pharmacological ppx  - tx SDH as above    #GOC  - Full code confirmed with pt 62 yo F h/o severe cardiomyopathy c/b CHF w EF 10% s/p AICD on home milrinone infusion via PICC, afib on aspirin & elliquis, DM2, HTN, thyroid cancer s/p thyroidectomy presents for mechanical fall, found to have SDH, admitted to MICU for multifactorial shock likely cardiogenic and septic shock possible PNA on levophed. Patient found with klebsiella bacteremia likely from catheter.     #Neuro  SDH s/p mechanical fall with head trauma i/s/o elliquis, last dose yesterday morning. Currently lethargic but arousable A&Ox3 w/o focal deficits, likely also component of acute metabolic encephalopathy i/s/o septic shock. Ammonia wnl. SDH stable on repeat CT head. S/p kaycentra gave IV vitamin K.   - frequent neuro checks  - appreciate neurosurgery recs  - No neuro deficits.   - Will get CT head if any mental status change.   - Elevated INR in the setting of liver injury, will give IV vitamin K for improvement of INR in the setting of recent bleed.     #CV  Severe cardiomyopathy c/b CHF s/p AICD on home milrinone infusion via PICC.  TTE from November 2018 w LVEF 10%.  Afib on aspirin & elliquis. Course c/b Vtach x 7 beats. Multifactorial shock w cardiogenic & septic shock from bacteremia.   - c/w norepinephrine gtt, titrate to maintain MAP > 65  - c/w milrinone gtt  - TTE  - EP recs appreciated. Device interrogated. AF burden 0%. No interventions needed.   - HF consult for diuresis & optimization  - hold elliquis & aspirin i/s/o SDH  - monitor telemetry  - EKG - QTc 510 on March 21     #Pulm  CT chest w groundglass opacities consistent w PNA vs pulm edema vs malignancy. Satting well on room air.  - tx for possible PNA as below    #ID  On admission, with Septic shock in the setting of PNA c/b Kleb bacteremia.   - Blood culture positive for klebsiella.   - c/w vancomycin 1g q12h & monitor vanc trough  - c/w meropenem  - PICC line tip culture growing GNR.     #Renal  - Replete electrolytes prn.   - monitor BMP, replete for K > 4, Mg > 2    #GI  Congestive hepatopathy. Abd mildly distended, non-tender. Cholelithiasis on prior US. Lipase 140. Ammonia wnl.   - Elevated bilirubin, likely from hepatic congestion 2/2 CHF.   - Dash diet     #Endo  DM2  - Moderate SSI   Thyroid cancer s/p thyroidectomy  - TSH 6.1 and T4 1.2     #MSK  Mechanical fall down stairs w head trauma & LOC, found to have SDH, no other evidence of trauma on imaging.  - PT consult   - appreciate trauma surgery recs    #Heme  - DVT ppx: SCDs, hold pharmacological ppx  - tx SDH as above    #GOC  - Full code confirmed with pt 64 yo F h/o severe cardiomyopathy c/b CHF w EF 10% s/p AICD on home milrinone infusion via PICC, afib on aspirin & elliquis, DM2, HTN, thyroid cancer s/p thyroidectomy presents for mechanical fall, found to have SDH w/ INR reversal with K-centra and IV Vitamin K admitted to MICU for multifactorial shock likely cardiogenic and septic shock possible PNA on levophed. Patient found with klebsiella bacteremia likely from catheter infection. Hospital course complicated by increasing bilirubin.     #Neuro  SDH s/p mechanical fall with head trauma i/s/o elliquis, last dose yesterday morning. Currently lethargic but arousable A&Ox3 w/o focal deficits, likely also component of acute metabolic encephalopathy i/s/o septic shock. Ammonia wnl. SDH stable on repeat CT head. S/p kaycentra gave IV vitamin K.   - frequent neuro checks  - appreciate neurosurgery recs  - No neuro deficits.   - Will get CT head if any mental status change.   - Elevated INR in the setting of liver injury, will give IV vitamin K x 3 doses for improvement of INR in the setting of recent bleed.     #CV  Severe cardiomyopathy c/b CHF s/p AICD on home milrinone infusion via PICC.  TTE from November 2018 w LVEF 10%.  Afib on aspirin & elliquis. Course c/b Vtach x 7 beats. Multifactorial shock w cardiogenic & septic shock from bacteremia.   - c/w norepinephrine gtt, titrate to maintain MAP > 65  - c/w milrinone gtt  - TTE  - EP recs appreciated. Device interrogated. AF burden 0%. No interventions needed.   - HF consult for diuresis & optimization  - hold elliquis & aspirin i/s/o SDH  - monitor telemetry  - EKG - QTc 510 on March 21     #Pulm  CT chest w groundglass opacities consistent w PNA vs pulm edema vs malignancy. Satting well on room air.  - tx for possible PNA as below    #ID  On admission, with Septic shock in the setting of PNA c/b Kleb bacteremia.   - Blood culture positive for klebsiella.   - c/w vancomycin 1g q12h & monitor vanc trough  - c/w meropenem  - PICC line tip culture growing GNR.     #Renal  - Replete electrolytes prn.   - monitor BMP, replete for K > 4, Mg > 2    #GI  Congestive hepatopathy. Abd mildly distended, non-tender. Cholelithiasis on prior US. Lipase 140. Ammonia wnl.   - Elevated bilirubin, likely from hepatic congestion 2/2 CHF.   - Dash diet     #Endo  DM2  - Moderate SSI   Thyroid cancer s/p thyroidectomy  - TSH 6.1 and T4 1.2     #MSK  Mechanical fall down stairs w head trauma & LOC, found to have SDH, no other evidence of trauma on imaging.  - PT consult   - appreciate trauma surgery recs    #Heme  - DVT ppx: SCDs, hold pharmacological ppx  - tx SDH as above    #GOC  - Full code confirmed with pt.     Discussed with ICU attending.

## 2019-03-21 NOTE — PROGRESS NOTE ADULT - SUBJECTIVE AND OBJECTIVE BOX
Patient denies chest pain or shortness of breath.   Review of systems otherwise (-)  	  MEDICATIONS:  MEDICATIONS  (STANDING):  chlorhexidine 4% Liquid 1 Application(s) Topical <User Schedule>  dextrose 5%. 1000 milliLiter(s) (50 mL/Hr) IV Continuous <Continuous>  dextrose 50% Injectable 12.5 Gram(s) IV Push once  dextrose 50% Injectable 25 Gram(s) IV Push once  dextrose 50% Injectable 25 Gram(s) IV Push once  insulin lispro (HumaLOG) corrective regimen sliding scale   SubCutaneous three times a day before meals  insulin lispro (HumaLOG) corrective regimen sliding scale   SubCutaneous at bedtime  investigational medication - general 50 milliGRAM(s) IV Push every 6 hours  investigational medication - IVPB 1500 milliGRAM(s) IV Intermittent every 6 hours  meropenem  IVPB 1000 milliGRAM(s) IV Intermittent every 8 hours  milrinone Infusion 0.5 MICROgram(s)/kG/Min (11.985 mL/Hr) IV Continuous <Continuous>  norepinephrine Infusion 0.05 MICROgram(s)/kG/Min (3.745 mL/Hr) IV Continuous <Continuous>  phytonadione  IVPB 10 milliGRAM(s) IV Intermittent daily  rifaximin 550 milliGRAM(s) Oral two times a day      LABS:	 	    CARDIAC MARKERS:  CARDIAC MARKERS ( 20 Mar 2019 08:41 )  x     / x     / 90 U/L / x     / 2.4 ng/mL                                9.9    38.6  )-----------( 225      ( 21 Mar 2019 04:27 )             31.3     Hemoglobin: 9.9 g/dL (03-21 @ 04:27)  Hemoglobin: 9.9 g/dL (03-20 @ 17:51)  Hemoglobin: 10.3 g/dL (03-20 @ 16:09)  Hemoglobin: 10.9 g/dL (03-20 @ 08:44)  Hemoglobin: 10.7 g/dL (03-20 @ 08:41)      03-21    128<L>  |  83<L>  |  30<H>  ----------------------------<  255<H>  3.4<L>   |  29  |  1.19    Ca    7.9<L>      21 Mar 2019 12:03  Phos  4.0     03-21  Mg     2.0     03-21    TPro  7.7  /  Alb  3.1<L>  /  TBili  8.4<H>  /  DBili  x   /  AST  49<H>  /  ALT  36  /  AlkPhos  419<H>  03-21    Creatinine Trend: 1.19<--, 1.38<--, 1.58<--, 1.27<--, 1.07<--, 1.07<--    COAGS:   PT/INR - ( 21 Mar 2019 04:27 )   PT: 25.6 sec;   INR: 2.17 ratio         PTT - ( 21 Mar 2019 12:03 )  PTT:32.2 sec        PHYSICAL EXAM:  T(C): 37.1 (03-21-19 @ 15:00), Max: 37.1 (03-21-19 @ 15:00)  HR: 110 (03-21-19 @ 16:15) (97 - 116)  BP: 105/64 (03-21-19 @ 16:15) (86/58 - 125/77)  RR: 19 (03-21-19 @ 16:15) (9 - 40)  SpO2: 100% (03-21-19 @ 16:15) (93% - 100%)  Wt(kg): --  I&O's Summary    20 Mar 2019 07:01  -  21 Mar 2019 07:00  --------------------------------------------------------  IN: 2088.7 mL / OUT: 1184 mL / NET: 904.7 mL    21 Mar 2019 07:01  -  21 Mar 2019 16:49  --------------------------------------------------------  IN: 449.8 mL / OUT: 1022 mL / NET: -572.2 mL          Gen: Appears well in NAD  HEENT:  (-)icterus (-)pallor  CV: N S1 S2 1/6 ROSALINDA (+)2 Pulses B/l  Resp:  Clear to ausculatation B/L, normal effort  GI: (+) BS Soft, NT, ND  Lymph:  (+)Edema, (-)obvious lymphadenopathy  Skin: Warm to touch, Normal turgor  Psych: Appropriate mood and affect      TELEMETRY: 	V paced          ASSESSMENT/PLAN: 	63y  Female F with history of severe NICM s/p AICD on home milrinone, AF on ac with eliquis, HTN, DM, thyroid CA s/p thyroidectomy who is being seen for heart failure and cardiomyopathy.    -APT and ac on hold due to SDH  -follow up neurosurgery  -continue with inotropic support with milrinone  -check AICD interrogation noted no events to explain fall  - Keep net negative      Augusto Grimes MD, FACC  BEEPER [phone]

## 2019-03-21 NOTE — PROGRESS NOTE ADULT - ATTENDING COMMENTS
62 yo F h/o CHF (EF 10%), s/p AICD on home milrinone infusion via PICC, afib on aspirin & elliquis, DM2, HTN, thyroid cancer s/p thyroidectomy p/w SDH (s/p kcentra), septic shock from klebsiella bacteremia which is likely from infected PICC which has been removed. Pt cont on baseline milrinone dose. Bumex dosing as needed. f/u HF reccs. Cont abx for bacteremia and f/u sensitivities. Cont vit k x 3 days and cont to monitor w/ neuro checks (repeat kcentra and cth if any change). Taper down levophed dose.

## 2019-03-22 LAB
-  AMIKACIN: SIGNIFICANT CHANGE UP
-  AMPICILLIN/SULBACTAM: SIGNIFICANT CHANGE UP
-  AMPICILLIN: SIGNIFICANT CHANGE UP
-  AZTREONAM: SIGNIFICANT CHANGE UP
-  CEFAZOLIN: SIGNIFICANT CHANGE UP
-  CEFEPIME: SIGNIFICANT CHANGE UP
-  CEFOXITIN: SIGNIFICANT CHANGE UP
-  CEFTRIAXONE: SIGNIFICANT CHANGE UP
-  CIPROFLOXACIN: SIGNIFICANT CHANGE UP
-  ERTAPENEM: SIGNIFICANT CHANGE UP
-  GENTAMICIN: SIGNIFICANT CHANGE UP
-  IMIPENEM: SIGNIFICANT CHANGE UP
-  LEVOFLOXACIN: SIGNIFICANT CHANGE UP
-  MEROPENEM: SIGNIFICANT CHANGE UP
-  PIPERACILLIN/TAZOBACTAM: SIGNIFICANT CHANGE UP
-  TOBRAMYCIN: SIGNIFICANT CHANGE UP
-  TRIMETHOPRIM/SULFAMETHOXAZOLE: SIGNIFICANT CHANGE UP
ALBUMIN SERPL ELPH-MCNC: 2.6 G/DL — LOW (ref 3.3–5)
ALBUMIN SERPL ELPH-MCNC: 2.6 G/DL — LOW (ref 3.3–5)
ALBUMIN SERPL ELPH-MCNC: 2.7 G/DL — LOW (ref 3.3–5)
ALP SERPL-CCNC: 326 U/L — HIGH (ref 40–120)
ALP SERPL-CCNC: 360 U/L — HIGH (ref 40–120)
ALP SERPL-CCNC: 368 U/L — HIGH (ref 40–120)
ALT FLD-CCNC: 27 U/L — SIGNIFICANT CHANGE UP (ref 10–45)
ALT FLD-CCNC: 28 U/L — SIGNIFICANT CHANGE UP (ref 10–45)
ALT FLD-CCNC: 31 U/L — SIGNIFICANT CHANGE UP (ref 10–45)
ANION GAP SERPL CALC-SCNC: 11 MMOL/L — SIGNIFICANT CHANGE UP (ref 5–17)
ANION GAP SERPL CALC-SCNC: 13 MMOL/L — SIGNIFICANT CHANGE UP (ref 5–17)
ANION GAP SERPL CALC-SCNC: 15 MMOL/L — SIGNIFICANT CHANGE UP (ref 5–17)
APTT BLD: 29.3 SEC — SIGNIFICANT CHANGE UP (ref 27.5–36.3)
APTT BLD: 29.4 SEC — SIGNIFICANT CHANGE UP (ref 27.5–36.3)
APTT BLD: 29.8 SEC — SIGNIFICANT CHANGE UP (ref 27.5–36.3)
AST SERPL-CCNC: 38 U/L — SIGNIFICANT CHANGE UP (ref 10–40)
AST SERPL-CCNC: 42 U/L — HIGH (ref 10–40)
AST SERPL-CCNC: 43 U/L — HIGH (ref 10–40)
BASE EXCESS BLDV CALC-SCNC: 4.3 MMOL/L — HIGH (ref -2–2)
BILIRUB SERPL-MCNC: 4.3 MG/DL — HIGH (ref 0.2–1.2)
BILIRUB SERPL-MCNC: 4.6 MG/DL — HIGH (ref 0.2–1.2)
BILIRUB SERPL-MCNC: 5.5 MG/DL — HIGH (ref 0.2–1.2)
BUN SERPL-MCNC: 17 MG/DL — SIGNIFICANT CHANGE UP (ref 7–23)
BUN SERPL-MCNC: 19 MG/DL — SIGNIFICANT CHANGE UP (ref 7–23)
BUN SERPL-MCNC: 25 MG/DL — HIGH (ref 7–23)
CALCIUM SERPL-MCNC: 6.9 MG/DL — LOW (ref 8.4–10.5)
CALCIUM SERPL-MCNC: 7.1 MG/DL — LOW (ref 8.4–10.5)
CALCIUM SERPL-MCNC: 7.1 MG/DL — LOW (ref 8.4–10.5)
CHLORIDE SERPL-SCNC: 86 MMOL/L — LOW (ref 96–108)
CHLORIDE SERPL-SCNC: 87 MMOL/L — LOW (ref 96–108)
CHLORIDE SERPL-SCNC: 92 MMOL/L — LOW (ref 96–108)
CO2 BLDV-SCNC: 30 MMOL/L — SIGNIFICANT CHANGE UP (ref 22–30)
CO2 SERPL-SCNC: 25 MMOL/L — SIGNIFICANT CHANGE UP (ref 22–31)
CO2 SERPL-SCNC: 26 MMOL/L — SIGNIFICANT CHANGE UP (ref 22–31)
CO2 SERPL-SCNC: 28 MMOL/L — SIGNIFICANT CHANGE UP (ref 22–31)
CREAT SERPL-MCNC: 0.82 MG/DL — SIGNIFICANT CHANGE UP (ref 0.5–1.3)
CREAT SERPL-MCNC: 0.9 MG/DL — SIGNIFICANT CHANGE UP (ref 0.5–1.3)
CREAT SERPL-MCNC: 1 MG/DL — SIGNIFICANT CHANGE UP (ref 0.5–1.3)
CULTURE RESULTS: SIGNIFICANT CHANGE UP
CULTURE RESULTS: SIGNIFICANT CHANGE UP
GAS PNL BLDV: SIGNIFICANT CHANGE UP
GLUCOSE BLDC GLUCOMTR-MCNC: 111 MG/DL — HIGH (ref 70–99)
GLUCOSE BLDC GLUCOMTR-MCNC: 142 MG/DL — HIGH (ref 70–99)
GLUCOSE BLDC GLUCOMTR-MCNC: 155 MG/DL — HIGH (ref 70–99)
GLUCOSE BLDC GLUCOMTR-MCNC: 304 MG/DL — HIGH (ref 70–99)
GLUCOSE SERPL-MCNC: 113 MG/DL — HIGH (ref 70–99)
GLUCOSE SERPL-MCNC: 266 MG/DL — HIGH (ref 70–99)
GLUCOSE SERPL-MCNC: 285 MG/DL — HIGH (ref 70–99)
HBA1C BLD-MCNC: 5.4 % — SIGNIFICANT CHANGE UP (ref 4–5.6)
HCO3 BLDV-SCNC: 28 MMOL/L — SIGNIFICANT CHANGE UP (ref 21–29)
HCT VFR BLD CALC: 31.5 % — LOW (ref 34.5–45)
HGB BLD-MCNC: 9.7 G/DL — LOW (ref 11.5–15.5)
HOROWITZ INDEX BLDV+IHG-RTO: 21 — SIGNIFICANT CHANGE UP
INR BLD: 1.94 RATIO — HIGH (ref 0.88–1.16)
INR BLD: 2.22 RATIO — HIGH (ref 0.88–1.16)
MAGNESIUM SERPL-MCNC: 1.6 MG/DL — SIGNIFICANT CHANGE UP (ref 1.6–2.6)
MAGNESIUM SERPL-MCNC: 1.7 MG/DL — SIGNIFICANT CHANGE UP (ref 1.6–2.6)
MCHC RBC-ENTMCNC: 23.5 PG — LOW (ref 27–34)
MCHC RBC-ENTMCNC: 30.6 GM/DL — LOW (ref 32–36)
MCV RBC AUTO: 76.9 FL — LOW (ref 80–100)
METHOD TYPE: SIGNIFICANT CHANGE UP
ORGANISM # SPEC MICROSCOPIC CNT: SIGNIFICANT CHANGE UP
PCO2 BLDV: 43 MMHG — SIGNIFICANT CHANGE UP (ref 35–50)
PH BLDV: 7.44 — SIGNIFICANT CHANGE UP (ref 7.35–7.45)
PHOSPHATE SERPL-MCNC: 3.3 MG/DL — SIGNIFICANT CHANGE UP (ref 2.5–4.5)
PHOSPHATE SERPL-MCNC: 3.5 MG/DL — SIGNIFICANT CHANGE UP (ref 2.5–4.5)
PLATELET # BLD AUTO: 238 K/UL — SIGNIFICANT CHANGE UP (ref 150–400)
PO2 BLDV: 28 MMHG — SIGNIFICANT CHANGE UP (ref 25–45)
POTASSIUM SERPL-MCNC: 3.5 MMOL/L — SIGNIFICANT CHANGE UP (ref 3.5–5.3)
POTASSIUM SERPL-MCNC: 3.8 MMOL/L — SIGNIFICANT CHANGE UP (ref 3.5–5.3)
POTASSIUM SERPL-MCNC: 4 MMOL/L — SIGNIFICANT CHANGE UP (ref 3.5–5.3)
POTASSIUM SERPL-SCNC: 3.5 MMOL/L — SIGNIFICANT CHANGE UP (ref 3.5–5.3)
POTASSIUM SERPL-SCNC: 3.8 MMOL/L — SIGNIFICANT CHANGE UP (ref 3.5–5.3)
POTASSIUM SERPL-SCNC: 4 MMOL/L — SIGNIFICANT CHANGE UP (ref 3.5–5.3)
PROT SERPL-MCNC: 6.3 G/DL — SIGNIFICANT CHANGE UP (ref 6–8.3)
PROT SERPL-MCNC: 7 G/DL — SIGNIFICANT CHANGE UP (ref 6–8.3)
PROT SERPL-MCNC: 7 G/DL — SIGNIFICANT CHANGE UP (ref 6–8.3)
PROTHROM AB SERPL-ACNC: 22.8 SEC — HIGH (ref 10–12.9)
PROTHROM AB SERPL-ACNC: 26 SEC — HIGH (ref 10–12.9)
RBC # BLD: 4.1 M/UL — SIGNIFICANT CHANGE UP (ref 3.8–5.2)
RBC # FLD: 23 % — HIGH (ref 10.3–14.5)
SAO2 % BLDV: 46 % — LOW (ref 67–88)
SODIUM SERPL-SCNC: 126 MMOL/L — LOW (ref 135–145)
SODIUM SERPL-SCNC: 126 MMOL/L — LOW (ref 135–145)
SODIUM SERPL-SCNC: 131 MMOL/L — LOW (ref 135–145)
SPECIMEN SOURCE: SIGNIFICANT CHANGE UP
SPECIMEN SOURCE: SIGNIFICANT CHANGE UP
WBC # BLD: 19.5 K/UL — HIGH (ref 3.8–10.5)
WBC # FLD AUTO: 19.5 K/UL — HIGH (ref 3.8–10.5)

## 2019-03-22 PROCEDURE — 99232 SBSQ HOSP IP/OBS MODERATE 35: CPT

## 2019-03-22 PROCEDURE — 93306 TTE W/DOPPLER COMPLETE: CPT | Mod: 26

## 2019-03-22 PROCEDURE — 99223 1ST HOSP IP/OBS HIGH 75: CPT | Mod: GC

## 2019-03-22 PROCEDURE — 99233 SBSQ HOSP IP/OBS HIGH 50: CPT

## 2019-03-22 RX ORDER — POTASSIUM CHLORIDE 20 MEQ
40 PACKET (EA) ORAL ONCE
Qty: 0 | Refills: 0 | Status: COMPLETED | OUTPATIENT
Start: 2019-03-22 | End: 2019-03-22

## 2019-03-22 RX ORDER — CEFTRIAXONE 500 MG/1
2 INJECTION, POWDER, FOR SOLUTION INTRAMUSCULAR; INTRAVENOUS ONCE
Qty: 0 | Refills: 0 | Status: COMPLETED | OUTPATIENT
Start: 2019-03-22 | End: 2019-03-22

## 2019-03-22 RX ORDER — MAGNESIUM SULFATE 500 MG/ML
1 VIAL (ML) INJECTION ONCE
Qty: 0 | Refills: 0 | Status: COMPLETED | OUTPATIENT
Start: 2019-03-22 | End: 2019-03-22

## 2019-03-22 RX ORDER — CEFTRIAXONE 500 MG/1
2 INJECTION, POWDER, FOR SOLUTION INTRAMUSCULAR; INTRAVENOUS EVERY 24 HOURS
Qty: 0 | Refills: 0 | Status: DISCONTINUED | OUTPATIENT
Start: 2019-03-23 | End: 2019-03-28

## 2019-03-22 RX ORDER — GABAPENTIN 400 MG/1
100 CAPSULE ORAL EVERY 12 HOURS
Qty: 0 | Refills: 0 | Status: DISCONTINUED | OUTPATIENT
Start: 2019-03-22 | End: 2019-03-28

## 2019-03-22 RX ORDER — POTASSIUM CHLORIDE 20 MEQ
20 PACKET (EA) ORAL ONCE
Qty: 0 | Refills: 0 | Status: COMPLETED | OUTPATIENT
Start: 2019-03-22 | End: 2019-03-22

## 2019-03-22 RX ORDER — CEFTRIAXONE 500 MG/1
INJECTION, POWDER, FOR SOLUTION INTRAMUSCULAR; INTRAVENOUS
Qty: 0 | Refills: 0 | Status: DISCONTINUED | OUTPATIENT
Start: 2019-03-22 | End: 2019-03-28

## 2019-03-22 RX ORDER — BUMETANIDE 0.25 MG/ML
1 INJECTION INTRAMUSCULAR; INTRAVENOUS DAILY
Qty: 0 | Refills: 0 | Status: DISCONTINUED | OUTPATIENT
Start: 2019-03-22 | End: 2019-03-23

## 2019-03-22 RX ADMIN — Medication 102 MILLIGRAM(S): at 12:06

## 2019-03-22 RX ADMIN — CEFTRIAXONE 100 GRAM(S): 500 INJECTION, POWDER, FOR SOLUTION INTRAMUSCULAR; INTRAVENOUS at 13:08

## 2019-03-22 RX ADMIN — Medication 2: at 11:26

## 2019-03-22 RX ADMIN — MEROPENEM 100 MILLIGRAM(S): 1 INJECTION INTRAVENOUS at 06:29

## 2019-03-22 RX ADMIN — GABAPENTIN 100 MILLIGRAM(S): 400 CAPSULE ORAL at 17:32

## 2019-03-22 RX ADMIN — Medication 8: at 17:32

## 2019-03-22 RX ADMIN — Medication 20 MILLIEQUIVALENT(S): at 04:41

## 2019-03-22 RX ADMIN — BENZOCAINE AND MENTHOL 1 LOZENGE: 5; 1 LIQUID ORAL at 22:56

## 2019-03-22 RX ADMIN — BUMETANIDE 1 MILLIGRAM(S): 0.25 INJECTION INTRAMUSCULAR; INTRAVENOUS at 20:10

## 2019-03-22 RX ADMIN — CHLORHEXIDINE GLUCONATE 1 APPLICATION(S): 213 SOLUTION TOPICAL at 04:45

## 2019-03-22 RX ADMIN — Medication 40 MILLIEQUIVALENT(S): at 09:35

## 2019-03-22 RX ADMIN — BENZOCAINE AND MENTHOL 1 LOZENGE: 5; 1 LIQUID ORAL at 13:50

## 2019-03-22 RX ADMIN — MILRINONE LACTATE 11.98 MICROGRAM(S)/KG/MIN: 1 INJECTION, SOLUTION INTRAVENOUS at 09:35

## 2019-03-22 RX ADMIN — BENZOCAINE AND MENTHOL 1 LOZENGE: 5; 1 LIQUID ORAL at 18:52

## 2019-03-22 RX ADMIN — Medication 100 GRAM(S): at 04:41

## 2019-03-22 NOTE — PROGRESS NOTE ADULT - ASSESSMENT
The patient is a deidre 63-year-old female with the past medical history of hypertension, diabetes, nonishemic cardiomyopathy, presumed parathyroidectomy, history of thyroid cancer, presents with mechanical fall.  The patient at present has hypervolemic hyponatremia.  She also has hypocalcemia, but again an old finding.    Gram neg rich sepsis  Hyponatremia         1.	Renal.  Restart her calcitriol and her Os-Donta.   Kdur 40meq po x 1   2.	Cardiology.   Continue with milrinone.  Off the Levophed.  Will need to restart diuretics soon      3.	Infectious Disease.    Continue with intravenous antibiotics.     4.	Heart failure called--Pt has poor insight into her disease process

## 2019-03-22 NOTE — DIETITIAN INITIAL EVALUATION ADULT. - OTHER INFO
Pt seen for length of stay in [facility]. RD unable to interview pt at this time. Per review of prior admission records, pt denied hx of chewing/swallowing difficulty. Reported UBW ~170 lbs, dry wt 165 lbs. Wt fluctuations noted. Per review, wt (3/1): 179 lbs. Dosing wt (3/20) 176.1 lbs. Appears stable; no edema noted at this time. Will obtain interim subjective diet/wt hx as able.

## 2019-03-22 NOTE — PROGRESS NOTE ADULT - SUBJECTIVE AND OBJECTIVE BOX
PATIENT: DELIA SERRANO   AGE: o     CHIEF COMPLAINT:  Patient is a 63y old  Female who presents with a chief complaint of mechanical fall (22 Mar 2019 07:58)    OVERNIGHT EVENTS:  No acute overnight events. Levophed was titrated off.     SUBJECTIVE: Patient seen and examined at bedside.     REVIEW OF SYSTEM:  CONSTITUTIONAL: No weakness, fevers or chills  EYES/ENT: No visual changes;  No vertigo or throat pain   NECK: No pain or stiffness  RESPIRATORY: No cough, wheezing, hemoptysis; No shortness of breath  CARDIOVASCULAR: No chest pain or palpitations  GASTROINTESTINAL: No abdominal or epigastric pain. No nausea, vomiting, or hematemesis; No diarrhea or constipation. No melena or hematochezia.  GENITOURINARY: No dysuria, frequency or hematuria  NEUROLOGICAL: No numbness or weakness  SKIN: No itching, rashes    OBJECTIVE:    VITAL SIGNS:  ICU Vital Signs Last 24 Hrs  T(C): 36.4 (22 Mar 2019 07:00), Max: 37.2 (22 Mar 2019 03:00)  T(F): 97.5 (22 Mar 2019 07:00), Max: 99 (22 Mar 2019 03:00)  HR: 105 (22 Mar 2019 07:45) (103 - 116)  BP: 98/57 (22 Mar 2019 07:45) (86/50 - 119/63)  BP(mean): 72 (22 Mar 2019 07:45) (64 - 92)  RR: 16 (22 Mar 2019 07:45) (13 - 28)  SpO2: 96% (22 Mar 2019 07:45) (90% - 100%)    03-21 @ 07:01  -  03-22 @ 07:00  --------------------------------------------------------  IN: 1437.8 mL / OUT: 2237 mL / NET: -799.2 mL    03-22 @ 07:01  -  03-22 @ 08:17  --------------------------------------------------------  IN: 12 mL / OUT: 85 mL / NET: -73 mL      CAPILLARY BLOOD GLUCOSE  POCT Blood Glucose.: 111 mg/dL (22 Mar 2019 07:53)    I/O SUMMARY:    03-21-19 @ 07:01  -  03-22-19 @ 07:00  --------------------------------------------------------  IN: 1437.8 mL / OUT: 2237 mL / NET: -799.2 mL    03-22-19 @ 07:01 - 03-22-19 @ 08:17  --------------------------------------------------------  IN: 12 mL / OUT: 85 mL / NET: -73 mL      PHYSICAL EXAM:  General: NAD  HEENT: NC/AT;, clear conjunctiva  Neck: supple  Respiratory: CTA b/l  Cardiovascular: +S1/S2; RRR  Abdomen: soft, NT/ND; +BS x4  Extremities: 2+ peripheral pulses b/l; Trace LE edema  Skin: Staples R temporal area staples. R IJ line.   Neurological: no focal deficits     MEDICATIONS:  MEDICATIONS  (STANDING):  chlorhexidine 4% Liquid 1 Application(s) Topical <User Schedule>  dextrose 5%. 1000 milliLiter(s) (50 mL/Hr) IV Continuous <Continuous>  dextrose 50% Injectable 12.5 Gram(s) IV Push once  dextrose 50% Injectable 25 Gram(s) IV Push once  dextrose 50% Injectable 25 Gram(s) IV Push once  insulin lispro (HumaLOG) corrective regimen sliding scale   SubCutaneous three times a day before meals  insulin lispro (HumaLOG) corrective regimen sliding scale   SubCutaneous at bedtime  investigational medication - general 50 milliGRAM(s) IV Push every 6 hours  investigational medication - IVPB 1500 milliGRAM(s) IV Intermittent every 6 hours  meropenem  IVPB 1000 milliGRAM(s) IV Intermittent every 8 hours  milrinone Infusion 0.5 MICROgram(s)/kG/Min (11.985 mL/Hr) IV Continuous <Continuous>  norepinephrine Infusion 0.05 MICROgram(s)/kG/Min (3.745 mL/Hr) IV Continuous <Continuous>  phytonadione  IVPB 10 milliGRAM(s) IV Intermittent daily  rifaximin 550 milliGRAM(s) Oral two times a day    MEDICATIONS  (PRN):  benzocaine 15 mG/menthol 3.6 mG Lozenge 1 Lozenge Oral every 4 hours PRN Sore Throat  dextrose 40% Gel 15 Gram(s) Oral once PRN Blood Glucose LESS THAN 70 milliGRAM(s)/deciliter  glucagon  Injectable 1 milliGRAM(s) IntraMuscular once PRN Glucose LESS THAN 70 milligrams/deciliter      ALLERGIES:  Allergies    Isordil (Headache)  penicillin (Rash)    Intolerances        LABS:                        9.7    19.5  )-----------( 238      ( 22 Mar 2019 00:39 )             31.5     03-22    126<L>  |  86<L>  |  25<H>  ----------------------------<  266<H>  3.8   |  25  |  1.00    Ca    7.1<L>      22 Mar 2019 00:39  Phos  3.3     03-22  Mg     1.6     03-22    TPro  7.0  /  Alb  2.6<L>  /  TBili  5.5<H>  /  DBili  x   /  AST  43<H>  /  ALT  31  /  AlkPhos  368<H>  03-22    LIVER FUNCTIONS - ( 22 Mar 2019 00:39 )  Alb: 2.6 g/dL / Pro: 7.0 g/dL / ALK PHOS: 368 U/L / ALT: 31 U/L / AST: 43 U/L / GGT: x           COAGULATION STUDIES:     aPTT  29.4 sec    (03-22-19 @ 07:57)     PT     x        (03-22-19 @ 07:57)     INR    x             (03-22-19 @ 07:57), COAGULATION STUDIES:     aPTT  29.3 sec    (03-22-19 @ 00:39)     PT     26.0 sec    (03-22-19 @ 00:39)     INR    2.22 ratio         (03-22-19 @ 00:39), COAGULATION STUDIES:     aPTT  34.1 sec    (03-21-19 @ 20:01)     PT     x        (03-21-19 @ 20:01)     INR    x             (03-21-19 @ 20:01), COAGULATION STUDIES:     aPTT  x        (03-21-19 @ 16:20)     PT     27.7 sec    (03-21-19 @ 16:20)     INR    2.37 ratio         (03-21-19 @ 16:20), COAGULATION STUDIES:     aPTT  32.2 sec    (03-21-19 @ 12:03)     PT     x        (03-21-19 @ 12:03)     INR    x             (03-21-19 @ 12:03), COAGULATION STUDIES:     aPTT  31.7 sec    (03-21-19 @ 04:27)     PT     25.6 sec    (03-21-19 @ 04:27)     INR    2.17 ratio         (03-21-19 @ 04:27), COAGULATION STUDIES:     aPTT  33.5 sec    (03-20-19 @ 16:09)     PT     x        (03-20-19 @ 16:09)     INR    x             (03-20-19 @ 16:09), COAGULATION STUDIES:     aPTT  25.9 sec    (03-20-19 @ 08:41)     PT     20.4 sec    (03-20-19 @ 08:41)     INR    1.76 ratio         (03-20-19 @ 08:41)   PT/INR - ( 22 Mar 2019 00:39 )   PT: 26.0 sec;   INR: 2.22 ratio         PTT - ( 22 Mar 2019 07:57 )  PTT:29.4 sec        POCT Blood Glucose.: 111 mg/dL (03-22-19 @ 07:53)  POCT Blood Glucose.: 248 mg/dL (03-21-19 @ 21:29)  POCT Blood Glucose.: 231 mg/dL (03-21-19 @ 16:35)  POCT Blood Glucose.: 252 mg/dL (03-21-19 @ 11:19)        MICROBIOLOGY:    Culture - Blood (collected 03-20-19 @ 06:14)  Source: .Blood Blood-Peripheral 1 AEROBIC BOTTLE REC&#x27;D  Gram Stain (03-20-19 @ 12:19):    Growth in aerobic bottle: Gram Negative Rods  Preliminary Report (03-21-19 @ 13:15):    Growth in aerobic bottle: Gram Negative Rods . Identification and    susceptibility to follow.    "Due to technical problems, Proteus sp. will Not be reported as part of    the BCID panel until further notice"    ***Blood Panel PCR results on this specimenare available    approximately 3 hours after the Gram stain result.***    Gram stain, PCR, and/or culture results may not always    correspond due to difference in methodologies.    ************************************************************    This PCR assaywas performed using [employer].    The following targets are tested for: Enterococcus,    vancomycin resistant enterococci, Listeria monocytogenes,    coagulase negative staphylococci, S. aureus,    methicillin resistant S. aureus, Streptococcus agalactiae    (Group B), S. pneumoniae, S. pyogenes (Group A),    Acinetobacter baumannii, Enterobacter cloacae, E. coli,    Klebsiella oxytoca, K. pneumoniae, Proteus sp.,    Serratia marcescens, Haemophilus influenzae,    Neisseria meningitidis, Pseudomonas aeruginosa, Candida    albicans, C. glabrata, C krusei, C parapsilosis,    C. tropicalis and the KPC resistance gene.  Organism: Blood Culture PCR (03-20-19 @ 14:06)  Organism: Blood Culture PCR (03-20-19 @ 14:06)    Culture - Blood (collected 03-20-19 @ 02:51)  Source: .Blood PICC Tip  Gram Stain (03-20-19 @ 19:10):    Growth in aerobic bottle: Gram Negative Rods  Preliminary Report (03-21-19 @ 20:29):    Growth in aerobic bottle: Gram Negative Rods    See previous culture [id number]        RADIOLOGY & ADDITIONAL TESTS: Reviewed.

## 2019-03-22 NOTE — DIETITIAN INITIAL EVALUATION ADULT. - PHYSICAL APPEARANCE
Unable to conduct Nutrition Focused Physical Assessment - pt with medical team and other medical services despite three attempts.

## 2019-03-22 NOTE — CONSULT NOTE ADULT - SUBJECTIVE AND OBJECTIVE BOX
Patient seen and evaluated at bedside    Chief Complaint: fevers, chills, fall    HPI:  62 yo F h/o severe non ischemic cardiomyopathy (EF 10%, LVIDd 6.1 cm) identified in 2013, moderate-severe MR, severe TR, s/p AICD on home milrinone infusion via PICC, afib on aspirin & elliquis, DM2, HTN, thyroid cancer s/p thyroidectomy presents for mechanical fall. Pt brought to ED for mechanical fall down 14 steps, denied loss of consciousness. Pt reports headache & back pain since the fall. She was able to ambulate after falling.    HOspital Course:  ED vitals were T 101 F , HR 86 - 133, BP 80/59 - 113/72, RR 15 - 18, SpO2 95 - 100%. CT Head, C-spine, thoracic spine, C/A/P performed demonstrating SDH. Pt was given vanc, azithro, aztreonam, tylenol, kaycentra, and 1 L NS. Seen by trauma surgery & neurosurgery, initially admitted to medicine, then became lethargic & hypotensive, started on levophed gtt and transferred to MICU. (20 Mar 2019 07:22)    In the MICU patient was treated for Klebsiella bacteremia. PAtient states that at home she was having fevers, chills, nausea, vomiting and abd cramps prior to coming to the hospital. PT has been weaned of epi and remains only on milrinone at present. Of note her discharge weight from recent ADHF hospitalization was 173.5 lbs. She was discharged on Bumex 4 mg BID, milrinone 0.5, hydral 50 mg BID ,       PMH:   Asthma  CHF (congestive heart failure)  DM (diabetes mellitus)  HTN (hypertension)  Thyroid ca      PSH:   AICD (automatic cardioverter/defibrillator) present  S/P thyroidectomy      Medications:   benzocaine 15 mG/menthol 3.6 mG Lozenge 1 Lozenge Oral every 4 hours PRN  cefTRIAXone   IVPB      chlorhexidine 4% Liquid 1 Application(s) Topical <User Schedule>  dextrose 40% Gel 15 Gram(s) Oral once PRN  dextrose 5%. 1000 milliLiter(s) IV Continuous <Continuous>  dextrose 50% Injectable 12.5 Gram(s) IV Push once  dextrose 50% Injectable 25 Gram(s) IV Push once  dextrose 50% Injectable 25 Gram(s) IV Push once  glucagon  Injectable 1 milliGRAM(s) IntraMuscular once PRN  insulin lispro (HumaLOG) corrective regimen sliding scale   SubCutaneous three times a day before meals  insulin lispro (HumaLOG) corrective regimen sliding scale   SubCutaneous at bedtime  investigational medication - general 50 milliGRAM(s) IV Push every 6 hours  investigational medication - IVPB 1500 milliGRAM(s) IV Intermittent every 6 hours  milrinone Infusion 0.5 MICROgram(s)/kG/Min IV Continuous <Continuous>  norepinephrine Infusion 0.05 MICROgram(s)/kG/Min IV Continuous <Continuous>  phytonadione  IVPB 10 milliGRAM(s) IV Intermittent daily  rifaximin 550 milliGRAM(s) Oral two times a day      Allergies:  Isordil (Headache)  penicillin (Rash)      FAMILY HISTORY:  No pertinent family history in first degree relatives      Social History:  Smoking History: denies   Alcohol Use: n/a   Drug Use: n/a     Review of Systems:  REVIEW OF SYSTEMS:  CONSTITUTIONAL: No weakness, fevers or chills  EYES/ENT: No visual changes;  No dysphagia  NECK: No pain or stiffness  RESPIRATORY: No cough, wheezing, hemoptysis; No shortness of breath  CARDIOVASCULAR: No chest pain or palpitations; No lower extremity edema  GASTROINTESTINAL: No abdominal or epigastric pain. No nausea, vomiting, or hematemesis; No diarrhea or constipation. No melena or hematochezia.  BACK: No back pain  GENITOURINARY: No dysuria, frequency or hematuria  NEUROLOGICAL: No numbness or weakness  SKIN: No itching, burning, rashes, or lesions   All other review of systems is negative unless indicated above.    Physical Exam:  T(F): 98.6 (03-22), Max: 99 (03-22)  HR: 109 (03-22) (101 - 116)  BP: 86/59 (03-22) (86/50 - 115/66)  RR: 17 (03-22)  SpO2: 100% (03-22)    GENERAL: No acute distress, well-developed  HEAD:  Atraumatic, Normocephalic  ENT: EOMI, PERRLA, conjunctiva and sclera clear, Neck supple, No JVD, moist mucosa  CHEST/LUNG: Clear to auscultation bilaterally; No wheeze, equal breath sounds bilaterally   BACK: No spinal tenderness  HEART: Regular rate and rhythm; No murmurs, rubs, or gallops  ABDOMEN: Soft, Nontender, Nondistended; Bowel sounds present  EXTREMITIES:  No clubbing, cyanosis, or edema  PSYCH: Nl behavior, nl affect  NEUROLOGY: AAOx3, non-focal, cranial nerves intact  SKIN: Normal color, No rashes or lesions  LINES: R iJ central line     Cardiovascular Diagnostic Testing:    ECG: Personally reviewed  v-paced rhythm at 100    Echo:  < from: TTE with Doppler (w/Cont) (11.07.18 @ 05:53) >  Dimensions:    Normal Values:  LA:     5.0    2.0 - 4.0 cm  Ao:     2.6    2.0 - 3.8 cm  SEPTUM: 0.8    0.6 - 1.2 cm  PWT:    0.9    0.6 - 1.1 cm  LVIDd:  6.1    3.0 - 5.6 cm  LVIDs:  5.5    1.8 - 4.0 cm  Derived variables:  LVMI: 99 g/m2  RWT: 0.29  Fractional short: 10 %  EF (Winter Rule): 9 %Doppler Peak Velocity (m/sec):  AoV=0.8  ------------------------------------------------------------------------  Observations:  Mitral Valve: Tethered mitral valve leaflets with normal  opening. Mitral annular calcification and thickened  mitral  leaflet tips Moderate mitral regurgitation.  Aortic Valve/Aorta: Calcified trileaflet aortic valve with  normal opening. Peak transaortic valve gradient equals 3 mm  Hg, mean transaortic valve gradient equals 1 mm Hg, aortic  valve velocity time integral equals 12 cm. Peak left  ventricular outflow tract gradient equals 1 mm Hg, mean  gradient is equal to 1 mm Hg, LVOT velocity time integral  equals 8 cm.  Aortic Root: 2.6 cm.  LVOT diameter: 1.8 cm.  Left Atrium: Moderately dilated left atrium.  LA volume  index = 43 cc/m2.  Left Ventricle: Severe global left ventricular systolic  dysfunction.  Endocardial visualization enhanced with  intravenous injection of Ultrasonic Enhancing Agent  (Definity). No LV thrombus.  Septal flattening consistent  with right ventricular overload. Eccentric left ventricular  hypertrophy (dilated left ventricle with normal relative  wall thickness). Severe diastolic dysfunction with elevated  filling pressures  Right Heart: Severe right atrial enlargement. Right  ventricular enlargement with decreased right ventricular  systolic function.  A device wire is noted in the right  heart. Dilated tricuspid annulus with malcoaptation of  tricuspid leaflets. Severe tricuspid regurgitation. Normal  pulmonic valve.  Pericardium/Pleura: Normal pericardium with no pericardial  effusion.  Hemodynamic: Estimated right ventricular systolic pressure  equals 19 mm Hg, assuming right atrial pressure equals 10 -  15 mm Hg, consistent with normal pulmonary  pressures.Estimated pulmonary artery systolic pressure  equals 19 mm Hg, However, this is likely underestimated in  the setting of severe TR  ------------------------------------------------------------------------  Conclusions:  1. Tethered mitral valve leaflets with normal opening.  Mitral annular calcification and thickened  mitral leaflet  tips Moderate mitral regurgitation.  2. Calcified trileaflet aortic valve with normal opening.  3. Moderately dilated left atrium.  LA volume index = 43  cc/m2.  4. Eccentric left ventricular hypertrophy (dilated left  ventricle with normal relative wall thickness).  5. Severe global left ventricularsystolic dysfunction.  Endocardial visualization enhanced with intravenous  injection of Ultrasonic Enhancing Agent (Definity). No LV  thrombus.  Septal flattening consistent with right  ventricular overload.  6. Severe diastolic dysfunction with elevated filling  pressures  7. Severe right atrial enlargement.  8. Right ventricular enlargement with decreased right  ventricular systolic function.  A device wire is noted in  the right heart.  9. Dilated tricuspid annulus with malcoaptation of  tricuspid leaflets. Severe tricuspid regurgitation.  *** No previous Echo exam.  ------------------------------------------------------------------------    < end of copied text >      Stress Testing:    Cath:  < from: Cardiac Cath Lab (01.08.14 @ 16:49) >  CORONARY VESSELS: The coronary circulation is co-dominant.  LM:   -- LM: Normal.  LAD:   --  LAD: Normal.  CX:   --  Circumflex: Normal.  RCA:   --  RCA: Normal.  COMPLICATIONS: There were no complications.    < end of copied text >      Interpretation of Telemetry: paced between 100-110     Imaging:  < from: Xray Chest 1 View- PORTABLE-Urgent (03.20.19 @ 15:25) >    INTERPRETATION:  CLINICAL INFORMATION: Right IJ triple-lumen catheter.    EXAM: Frontal radiograph of the chest.    COMPARISON: Chest radiograph from 3/19/2019.    FINDINGS:  Interval placement of right sided central venous catheter with tip in the   SVC. A right-sided PICC line tip is in the SVC. Left-sided AICD.     The lungs are clear. No pleural effusion or pneumothorax. The heart is   enlarged.    IMPRESSION: Right IJ catheter is in the SVC. No pneumothorax.      < end of copied text >      Labs: Personally reviewed                        9.7    19.5  )-----------( 238      ( 22 Mar 2019 00:39 )             31.5     03-22    131<L>  |  92<L>  |  19  ----------------------------<  113<H>  3.5   |  28  |  0.90    Ca    6.9<L>      22 Mar 2019 07:57  Phos  3.5     03-22  Mg     1.7     03-22    TPro  6.3  /  Alb  2.7<L>  /  TBili  4.6<H>  /  DBili  x   /  AST  38  /  ALT  27  /  AlkPhos  326<H>  03-22    PT/INR - ( 22 Mar 2019 12:20 )   PT: 22.8 sec;   INR: 1.94 ratio         PTT - ( 22 Mar 2019 07:57 )  PTT:29.4 sec  CARDIAC MARKERS ( 20 Mar 2019 08:41 )  55 ng/L / x     / x     / 90 U/L / x     / 2.4 ng/mL  CARDIAC MARKERS ( 20 Mar 2019 06:03 )  37 ng/L / x     / x     / x     / x     / x          Culture - Blood (03.20.19 @ 06:14)    -  Amikacin: S <=16    -  Ampicillin/Sulbactam: S <=8/4    -  Aztreonam: S <=4    -  Cefepime: S <=4    -  Ceftriaxone: S <=1 Enterobacter, Citrobacter, and Serratia may develop resistance during prolonged therapy    -  Ciprofloxacin: S <=1    -  Ertapenem: S <=1    -  Gentamicin: S <=4    -  Imipenem: S <=1    -  Piperacillin/Tazobactam: S <=16    -  Tobramycin: S <=4    -  Trimethoprim/Sulfamethoxazole: S <=2/38    Gram Stain:   Growth in aerobic bottle: Gram Negative Rods    -  Levofloxacin: S <=2    -  Meropenem: S <=1    -  Klebsiella pneumoniae: Detec    -  Ampicillin: R >16 These ampicillin results predict results for amoxicillin    -  Cefazolin: S <=8    -  Cefoxitin: S <=8    Specimen Source: .Blood Blood-Peripheral 1 AEROBIC BOTTLE REC'D    Organism: Blood Culture PCR    Organism: Klebsiella pneumoniae    Culture Results:   Growth in aerobic bottle: Klebsiella pneumoniae  "Due to technical problems, Proteus sp. will Not be reported as part of  the BCID panel until further notice"  ***Blood Panel PCR results on this specimen are available  approximately 3 hours after the Gram stain result.***  Gram stain, PCR, and/or culture results may not always  correspond due to difference in methodologies.  ************************************************************  This PCR assay was performed using [employer].  The following targets are tested for: Enterococcus,  vancomycin resistant enterococci, Listeria monocytogenes,  coagulase negative staphylococci, S. aureus,  methicillin resistant S. aureus, Streptococcus agalactiae  (Group B), S. pneumoniae, S. pyogenes (Group A),  Acinetobacter baumannii, Enterobacter cloacae, E. coli,  Klebsiella oxytoca, K. pneumoniae, Proteus sp.,  Serratia marcescens, Haemophilus influenzae,  Neisseria meningitidis, Pseudomonas aeruginosa, Candida  albicans, C. glabrata, C krusei, C parapsilosis,  C. tropicalis and the KPC resistance gene.    Organism Identification: Blood Culture PCR  Klebsiella pneumoniae    Method Type: PCR    Method Type: JOSÉ MIGUEL      Hemoglobin A1C, Whole Blood: 5.4 % (03-22 @ 09:10)    Thyroid Stimulating Hormone, Serum: 6.18 uIU/mL (03-20 @ 11:16) Patient seen and evaluated at bedside    Chief Complaint: fevers, chills, fall    HPI:  62 yo F h/o severe non ischemic cardiomyopathy (EF 10%, LVIDd 6.1 cm) identified in 2013, moderate-severe MR, severe TR, s/p AICD on home milrinone infusion via PICC, afib on aspirin & elliquis, DM2, HTN, thyroid cancer s/p thyroidectomy presents for mechanical fall. Pt brought to ED for mechanical fall down 14 steps, denied loss of consciousness. Pt reports headache & back pain since the fall. She was able to ambulate after falling.    HOspital Course:  ED vitals were T 101 F , HR 86 - 133, BP 80/59 - 113/72, RR 15 - 18, SpO2 95 - 100%. CT Head, C-spine, thoracic spine, C/A/P performed demonstrating SDH. Pt was given vanc, azithro, aztreonam, tylenol, kaycentra, and 1 L NS. Seen by trauma surgery & neurosurgery, initially admitted to medicine, then became lethargic & hypotensive, started on levophed gtt and transferred to MICU. (20 Mar 2019 07:22)    In the MICU patient was treated for Klebsiella bacteremia. PAtient states that at home she was having fevers, chills, nausea, vomiting and abd cramps prior to coming to the hospital. PT has been weaned of epi and remains only on milrinone at present. Of note her discharge weight from recent ADHF hospitalization was 173.5 lbs. She was discharged on Bumex 4 mg BID, milrinone 0.5, hydral 50 mg BID ,       PMH:   Asthma  CHF (congestive heart failure)  DM (diabetes mellitus)  HTN (hypertension)  Thyroid ca      PSH:   AICD (automatic cardioverter/defibrillator) present  S/P thyroidectomy      Medications:   benzocaine 15 mG/menthol 3.6 mG Lozenge 1 Lozenge Oral every 4 hours PRN  cefTRIAXone   IVPB      chlorhexidine 4% Liquid 1 Application(s) Topical <User Schedule>  dextrose 40% Gel 15 Gram(s) Oral once PRN  dextrose 5%. 1000 milliLiter(s) IV Continuous <Continuous>  dextrose 50% Injectable 12.5 Gram(s) IV Push once  dextrose 50% Injectable 25 Gram(s) IV Push once  dextrose 50% Injectable 25 Gram(s) IV Push once  glucagon  Injectable 1 milliGRAM(s) IntraMuscular once PRN  insulin lispro (HumaLOG) corrective regimen sliding scale   SubCutaneous three times a day before meals  insulin lispro (HumaLOG) corrective regimen sliding scale   SubCutaneous at bedtime  investigational medication - general 50 milliGRAM(s) IV Push every 6 hours  investigational medication - IVPB 1500 milliGRAM(s) IV Intermittent every 6 hours  milrinone Infusion 0.5 MICROgram(s)/kG/Min IV Continuous <Continuous>  norepinephrine Infusion 0.05 MICROgram(s)/kG/Min IV Continuous <Continuous>  phytonadione  IVPB 10 milliGRAM(s) IV Intermittent daily  rifaximin 550 milliGRAM(s) Oral two times a day      Allergies:  Isordil (Headache)  penicillin (Rash)      FAMILY HISTORY:  No pertinent family history in first degree relatives      Social History:  Smoking History: denies   Alcohol Use: n/a   Drug Use: n/a     Review of Systems:  REVIEW OF SYSTEMS:  CONSTITUTIONAL: No weakness, fevers or chills  EYES/ENT: No visual changes;  No dysphagia  NECK: No pain or stiffness  RESPIRATORY: No cough, wheezing, hemoptysis; No shortness of breath  CARDIOVASCULAR: No chest pain or palpitations; No lower extremity edema  GASTROINTESTINAL: No abdominal or epigastric pain. No nausea, vomiting, or hematemesis; No diarrhea or constipation. No melena or hematochezia.  BACK: No back pain  GENITOURINARY: No dysuria, frequency or hematuria  NEUROLOGICAL: No numbness or weakness  SKIN: No itching, burning, rashes, or lesions   All other review of systems is negative unless indicated above.    Physical Exam:  T(F): 98.6 (03-22), Max: 99 (03-22)  HR: 109 (03-22) (101 - 116)  BP: 86/59 (03-22) (86/50 - 115/66)  RR: 17 (03-22)  SpO2: 100% (03-22)    GENERAL: No acute distress, well-developed  HEAD:  Atraumatic, Normocephalic  ENT: EOMI, PERRLA, conjunctiva and sclera clear, Neck supple, JVD approx 8 cm with mild HJR, moist mucosa  CHEST/LUNG: Clear to auscultation bilaterally; No wheeze, equal breath sounds bilaterally   BACK: No spinal tenderness  HEART: Regular rate and rhythm; No murmurs, rubs, or gallops  ABDOMEN: Soft, Nontender, Nondistended; Bowel sounds present  EXTREMITIES:  No clubbing, cyanosis, or edema  PSYCH: Nl behavior, nl affect  NEUROLOGY: AAOx3, non-focal, cranial nerves intact  SKIN: Normal color, No rashes or lesions  LINES: R iJ central line     Cardiovascular Diagnostic Testing:    ECG: Personally reviewed  v-paced rhythm at 100    Echo:  < from: TTE with Doppler (w/Cont) (11.07.18 @ 05:53) >  Dimensions:    Normal Values:  LA:     5.0    2.0 - 4.0 cm  Ao:     2.6    2.0 - 3.8 cm  SEPTUM: 0.8    0.6 - 1.2 cm  PWT:    0.9    0.6 - 1.1 cm  LVIDd:  6.1    3.0 - 5.6 cm  LVIDs:  5.5    1.8 - 4.0 cm  Derived variables:  LVMI: 99 g/m2  RWT: 0.29  Fractional short: 10 %  EF (Winter Rule): 9 %Doppler Peak Velocity (m/sec):  AoV=0.8  ------------------------------------------------------------------------  Observations:  Mitral Valve: Tethered mitral valve leaflets with normal  opening. Mitral annular calcification and thickened  mitral  leaflet tips Moderate mitral regurgitation.  Aortic Valve/Aorta: Calcified trileaflet aortic valve with  normal opening. Peak transaortic valve gradient equals 3 mm  Hg, mean transaortic valve gradient equals 1 mm Hg, aortic  valve velocity time integral equals 12 cm. Peak left  ventricular outflow tract gradient equals 1 mm Hg, mean  gradient is equal to 1 mm Hg, LVOT velocity time integral  equals 8 cm.  Aortic Root: 2.6 cm.  LVOT diameter: 1.8 cm.  Left Atrium: Moderately dilated left atrium.  LA volume  index = 43 cc/m2.  Left Ventricle: Severe global left ventricular systolic  dysfunction.  Endocardial visualization enhanced with  intravenous injection of Ultrasonic Enhancing Agent  (Definity). No LV thrombus.  Septal flattening consistent  with right ventricular overload. Eccentric left ventricular  hypertrophy (dilated left ventricle with normal relative  wall thickness). Severe diastolic dysfunction with elevated  filling pressures  Right Heart: Severe right atrial enlargement. Right  ventricular enlargement with decreased right ventricular  systolic function.  A device wire is noted in the right  heart. Dilated tricuspid annulus with malcoaptation of  tricuspid leaflets. Severe tricuspid regurgitation. Normal  pulmonic valve.  Pericardium/Pleura: Normal pericardium with no pericardial  effusion.  Hemodynamic: Estimated right ventricular systolic pressure  equals 19 mm Hg, assuming right atrial pressure equals 10 -  15 mm Hg, consistent with normal pulmonary  pressures.Estimated pulmonary artery systolic pressure  equals 19 mm Hg, However, this is likely underestimated in  the setting of severe TR  ------------------------------------------------------------------------  Conclusions:  1. Tethered mitral valve leaflets with normal opening.  Mitral annular calcification and thickened  mitral leaflet  tips Moderate mitral regurgitation.  2. Calcified trileaflet aortic valve with normal opening.  3. Moderately dilated left atrium.  LA volume index = 43  cc/m2.  4. Eccentric left ventricular hypertrophy (dilated left  ventricle with normal relative wall thickness).  5. Severe global left ventricularsystolic dysfunction.  Endocardial visualization enhanced with intravenous  injection of Ultrasonic Enhancing Agent (Definity). No LV  thrombus.  Septal flattening consistent with right  ventricular overload.  6. Severe diastolic dysfunction with elevated filling  pressures  7. Severe right atrial enlargement.  8. Right ventricular enlargement with decreased right  ventricular systolic function.  A device wire is noted in  the right heart.  9. Dilated tricuspid annulus with malcoaptation of  tricuspid leaflets. Severe tricuspid regurgitation.  *** No previous Echo exam.  ------------------------------------------------------------------------    < end of copied text >      Stress Testing:    Cath:  < from: Cardiac Cath Lab (01.08.14 @ 16:49) >  CORONARY VESSELS: The coronary circulation is co-dominant.  LM:   -- LM: Normal.  LAD:   --  LAD: Normal.  CX:   --  Circumflex: Normal.  RCA:   --  RCA: Normal.  COMPLICATIONS: There were no complications.    < end of copied text >      Interpretation of Telemetry: paced between 100-110     Imaging:  < from: Xray Chest 1 View- PORTABLE-Urgent (03.20.19 @ 15:25) >    INTERPRETATION:  CLINICAL INFORMATION: Right IJ triple-lumen catheter.    EXAM: Frontal radiograph of the chest.    COMPARISON: Chest radiograph from 3/19/2019.    FINDINGS:  Interval placement of right sided central venous catheter with tip in the   SVC. A right-sided PICC line tip is in the SVC. Left-sided AICD.     The lungs are clear. No pleural effusion or pneumothorax. The heart is   enlarged.    IMPRESSION: Right IJ catheter is in the SVC. No pneumothorax.      < end of copied text >      Labs: Personally reviewed                        9.7    19.5  )-----------( 238      ( 22 Mar 2019 00:39 )             31.5     03-22    131<L>  |  92<L>  |  19  ----------------------------<  113<H>  3.5   |  28  |  0.90    Ca    6.9<L>      22 Mar 2019 07:57  Phos  3.5     03-22  Mg     1.7     03-22    TPro  6.3  /  Alb  2.7<L>  /  TBili  4.6<H>  /  DBili  x   /  AST  38  /  ALT  27  /  AlkPhos  326<H>  03-22    PT/INR - ( 22 Mar 2019 12:20 )   PT: 22.8 sec;   INR: 1.94 ratio         PTT - ( 22 Mar 2019 07:57 )  PTT:29.4 sec  CARDIAC MARKERS ( 20 Mar 2019 08:41 )  55 ng/L / x     / x     / 90 U/L / x     / 2.4 ng/mL  CARDIAC MARKERS ( 20 Mar 2019 06:03 )  37 ng/L / x     / x     / x     / x     / x          Culture - Blood (03.20.19 @ 06:14)    -  Amikacin: S <=16    -  Ampicillin/Sulbactam: S <=8/4    -  Aztreonam: S <=4    -  Cefepime: S <=4    -  Ceftriaxone: S <=1 Enterobacter, Citrobacter, and Serratia may develop resistance during prolonged therapy    -  Ciprofloxacin: S <=1    -  Ertapenem: S <=1    -  Gentamicin: S <=4    -  Imipenem: S <=1    -  Piperacillin/Tazobactam: S <=16    -  Tobramycin: S <=4    -  Trimethoprim/Sulfamethoxazole: S <=2/38    Gram Stain:   Growth in aerobic bottle: Gram Negative Rods    -  Levofloxacin: S <=2    -  Meropenem: S <=1    -  Klebsiella pneumoniae: Detec    -  Ampicillin: R >16 These ampicillin results predict results for amoxicillin    -  Cefazolin: S <=8    -  Cefoxitin: S <=8    Specimen Source: .Blood Blood-Peripheral 1 AEROBIC BOTTLE REC'D    Organism: Blood Culture PCR    Organism: Klebsiella pneumoniae    Culture Results:   Growth in aerobic bottle: Klebsiella pneumoniae  "Due to technical problems, Proteus sp. will Not be reported as part of  the BCID panel until further notice"  ***Blood Panel PCR results on this specimen are available  approximately 3 hours after the Gram stain result.***  Gram stain, PCR, and/or culture results may not always  correspond due to difference in methodologies.  ************************************************************  This PCR assay was performed using [employer].  The following targets are tested for: Enterococcus,  vancomycin resistant enterococci, Listeria monocytogenes,  coagulase negative staphylococci, S. aureus,  methicillin resistant S. aureus, Streptococcus agalactiae  (Group B), S. pneumoniae, S. pyogenes (Group A),  Acinetobacter baumannii, Enterobacter cloacae, E. coli,  Klebsiella oxytoca, K. pneumoniae, Proteus sp.,  Serratia marcescens, Haemophilus influenzae,  Neisseria meningitidis, Pseudomonas aeruginosa, Candida  albicans, C. glabrata, C krusei, C parapsilosis,  C. tropicalis and the KPC resistance gene.    Organism Identification: Blood Culture PCR  Klebsiella pneumoniae    Method Type: PCR    Method Type: JOSÉ MIGUEL      Hemoglobin A1C, Whole Blood: 5.4 % (03-22 @ 09:10)    Thyroid Stimulating Hormone, Serum: 6.18 uIU/mL (03-20 @ 11:16)

## 2019-03-22 NOTE — PROGRESS NOTE ADULT - ASSESSMENT
64 yo F h/o severe cardiomyopathy c/b CHF w EF 10% s/p AICD on home milrinone infusion via PICC, afib on aspirin & elliquis, DM2, HTN, thyroid cancer s/p thyroidectomy presents for mechanical fall, found to have SDH, admitted to MICU for multifactorial shock likely cardiogenic and septic shock i/s/o possible PNA.    Mechanical Fall with SDH   - SDH stable on repeat CT head. S/p kaycentra for elevated INR 1.7.  - serial head CT  - frequent neuro checks  - appreciate neurosurgery recs    Severe cardiomyopathy c/b CHF s/p AICD on home milrinone infusion via PICC with Shock ; Multifactorial shock w cardiogenic & septic shock i/s/o PNA. Prolonged QTc 602 ms. Troponin 37.  - amiodarone 150 mg stat  - c/w norepinephrine gtt, titrate to maintain MAP > 65  - c/w milrinone gtt  - TTE    Sepsis  with Bacteremia K Pneumonia  : -ID helping.   - IV Rocephin     Hypokalemia & hypomagnesemia. Cr stable. Renal helping.   - monitor BMP, replete for K > 4, Mg > 2    Congestive hepatopathy : Sec to CHF .Abd mildly distended, non-tender. Cholelithiasis on prior US. Lipase 140. Ammonia wnl.   - Stable.     DM2  - ISS q6h  Thyroid cancer s/p thyroidectomy  - on synthroid     - DVT ppx: SCDs, hold pharmacological ppx  - tx SDH as above

## 2019-03-22 NOTE — PROGRESS NOTE ADULT - ATTENDING COMMENTS
Patient seen and examined, agree with above assessment and plan as transcribed above.    - Cont milrinone  - BP improved now off pressors  - NSx f/u      Augusto Grimes MD, [facility]  BEEPER [phone]

## 2019-03-22 NOTE — PHYSICAL THERAPY INITIAL EVALUATION ADULT - ADDITIONAL COMMENTS
Pt states she lives with spouse and daughter in a private home with 3 steps to enter and a flight of steps inside Pt states she lives with spouse and daughter in a private home with 3 steps to enter and a flight of steps inside (+handrail). Pt states she was independent with ADLs and functional mobility prior to admission. Pt states she uses a straight cane with functional mobility.

## 2019-03-22 NOTE — PROGRESS NOTE ADULT - SUBJECTIVE AND OBJECTIVE BOX
INTERVAL HPI/OVERNIGHT EVENTS: I do not want any PICC line for a while as want to rest .   Vital Signs Last 24 Hrs  T(C): 37 (22 Mar 2019 11:00), Max: 37.2 (22 Mar 2019 03:00)  T(F): 98.6 (22 Mar 2019 11:00), Max: 99 (22 Mar 2019 03:00)  HR: 116 (22 Mar 2019 14:00) (101 - 116)  BP: 94/58 (22 Mar 2019 14:00) (86/50 - 115/66)  BP(mean): 71 (22 Mar 2019 14:00) (63 - 92)  RR: 15 (22 Mar 2019 14:00) (13 - 30)  SpO2: 93% (22 Mar 2019 14:00) (90% - 100%)  I&O's Summary    21 Mar 2019 07:01  -  22 Mar 2019 07:00  --------------------------------------------------------  IN: 1437.8 mL / OUT: 2237 mL / NET: -799.2 mL    22 Mar 2019 07:01  -  22 Mar 2019 14:41  --------------------------------------------------------  IN: 404 mL / OUT: 340 mL / NET: 64 mL      MEDICATIONS  (STANDING):  cefTRIAXone   IVPB      chlorhexidine 4% Liquid 1 Application(s) Topical <User Schedule>  dextrose 5%. 1000 milliLiter(s) (50 mL/Hr) IV Continuous <Continuous>  dextrose 50% Injectable 12.5 Gram(s) IV Push once  dextrose 50% Injectable 25 Gram(s) IV Push once  dextrose 50% Injectable 25 Gram(s) IV Push once  insulin lispro (HumaLOG) corrective regimen sliding scale   SubCutaneous three times a day before meals  insulin lispro (HumaLOG) corrective regimen sliding scale   SubCutaneous at bedtime  investigational medication - general 50 milliGRAM(s) IV Push every 6 hours  investigational medication - IVPB 1500 milliGRAM(s) IV Intermittent every 6 hours  milrinone Infusion 0.5 MICROgram(s)/kG/Min (11.985 mL/Hr) IV Continuous <Continuous>  norepinephrine Infusion 0.05 MICROgram(s)/kG/Min (3.745 mL/Hr) IV Continuous <Continuous>  phytonadione  IVPB 10 milliGRAM(s) IV Intermittent daily  rifaximin 550 milliGRAM(s) Oral two times a day    MEDICATIONS  (PRN):  benzocaine 15 mG/menthol 3.6 mG Lozenge 1 Lozenge Oral every 4 hours PRN Sore Throat  dextrose 40% Gel 15 Gram(s) Oral once PRN Blood Glucose LESS THAN 70 milliGRAM(s)/deciliter  glucagon  Injectable 1 milliGRAM(s) IntraMuscular once PRN Glucose LESS THAN 70 milligrams/deciliter    LABS:                        9.7    19.5  )-----------( 238      ( 22 Mar 2019 00:39 )             31.5     03-22    131<L>  |  92<L>  |  19  ----------------------------<  113<H>  3.5   |  28  |  0.90    Ca    6.9<L>      22 Mar 2019 07:57  Phos  3.5     03-22  Mg     1.7     03-22    TPro  6.3  /  Alb  2.7<L>  /  TBili  4.6<H>  /  DBili  x   /  AST  38  /  ALT  27  /  AlkPhos  326<H>  03-22    PT/INR - ( 22 Mar 2019 12:20 )   PT: 22.8 sec;   INR: 1.94 ratio         PTT - ( 22 Mar 2019 07:57 )  PTT:29.4 sec    CAPILLARY BLOOD GLUCOSE      POCT Blood Glucose.: 155 mg/dL (22 Mar 2019 10:49)  POCT Blood Glucose.: 111 mg/dL (22 Mar 2019 07:53)  POCT Blood Glucose.: 248 mg/dL (21 Mar 2019 21:29)  POCT Blood Glucose.: 231 mg/dL (21 Mar 2019 16:35)          REVIEW OF SYSTEMS:  CONSTITUTIONAL: No fever, weight loss, or fatigue  EYES: No eye pain, visual disturbances, or discharge  ENMT:  No difficulty hearing, tinnitus, vertigo; No sinus or throat pain  RESPIRATORY: No cough, wheezing, chills or hemoptysis; No shortness of breath  CARDIOVASCULAR: No chest pain, palpitations, dizziness, or leg swelling  GASTROINTESTINAL: No abdominal or epigastric pain. No nausea, vomiting, or hematemesis; No diarrhea or constipation. No melena or hematochezia.  GENITOURINARY: No dysuria, frequency, hematuria, or incontinence  NEUROLOGICAL: No headaches, memory loss, loss of strength, numbness, or tremors  RADIOLOGY & ADDITIONAL TESTS:    Consultant(s) Notes Reviewed:  [x ] YES  [ ] NO    PHYSICAL EXAM:  GENERAL: NAD, well-groomed, well-developed, not in any distress ,  HEAD:  Atraumatic, Normocephalic  NECK: +++JVD, Normal thyroid. IJ catheters  NERVOUS SYSTEM:  Alert & Oriented X3, No focal deficit   CHEST/LUNG: Good air entry bilateral with no  rales, rhonchi, wheezing, or rubs  HEART: Regular rate and rhythm; No murmurs, rubs, or gallops  ABDOMEN: Soft, Nontender, Nondistended; Bowel sounds present  EXTREMITIES:  2+ Peripheral Pulses, No clubbing, cyanosis ,but less edema    Care Discussed with Consultants/Other Providers [ x] YES  [ ] NO

## 2019-03-22 NOTE — PROGRESS NOTE ADULT - ATTENDING COMMENTS
62 yo F h/o CHF (EF 10%), s/p AICD on home milrinone infusion via PICC, afib on aspirin & elliquis, DM2, HTN, thyroid cancer s/p thyroidectomy p/w SDH (s/p kcentra), septic shock from klebsiella bacteremia which is likely from infected PICC which has been removed. Pt cont on baseline milrinone dose. Bumex dosed yesterday x1. f/u HF reccs. Cont abx for bacteremia but kleb bacteremia pan S- abx cahnged to CTX 2g. Cont vit k x 3 days and cont to monitor w/ neuro checks (repeat kcentra and cth if any change). BP stable off levophed

## 2019-03-22 NOTE — PROGRESS NOTE ADULT - SUBJECTIVE AND OBJECTIVE BOX
NEPHROLOGY-[facility] [phone]        Patient seen and examined in bed.  She is off pressors at present   On Milrinone         MEDICATIONS  (STANDING):  cefTRIAXone   IVPB      cefTRIAXone   IVPB 2 Gram(s) IV Intermittent once  chlorhexidine 4% Liquid 1 Application(s) Topical <User Schedule>  dextrose 5%. 1000 milliLiter(s) (50 mL/Hr) IV Continuous <Continuous>  dextrose 50% Injectable 12.5 Gram(s) IV Push once  dextrose 50% Injectable 25 Gram(s) IV Push once  dextrose 50% Injectable 25 Gram(s) IV Push once  insulin lispro (HumaLOG) corrective regimen sliding scale   SubCutaneous three times a day before meals  insulin lispro (HumaLOG) corrective regimen sliding scale   SubCutaneous at bedtime  investigational medication - general 50 milliGRAM(s) IV Push every 6 hours  investigational medication - IVPB 1500 milliGRAM(s) IV Intermittent every 6 hours  milrinone Infusion 0.5 MICROgram(s)/kG/Min (11.985 mL/Hr) IV Continuous <Continuous>  norepinephrine Infusion 0.05 MICROgram(s)/kG/Min (3.745 mL/Hr) IV Continuous <Continuous>  phytonadione  IVPB 10 milliGRAM(s) IV Intermittent daily  rifaximin 550 milliGRAM(s) Oral two times a day      VITAL:  T(C): , Max: 37.2 (03-22-19 @ 03:00)  T(F): , Max: 99 (03-22-19 @ 03:00)  HR: 109 (03-22-19 @ 12:00)  BP: 86/59 (03-22-19 @ 12:00)  BP(mean): 68 (03-22-19 @ 12:00)  RR: 17 (03-22-19 @ 12:00)  SpO2: 100% (03-22-19 @ 12:00)  Wt(kg): --    I and O's:    03-21 @ 07:01  -  03-22 @ 07:00  --------------------------------------------------------  IN: 1437.8 mL / OUT: 2237 mL / NET: -799.2 mL    03-22 @ 07:01  -  03-22 @ 13:05  --------------------------------------------------------  IN: 330 mL / OUT: 275 mL / NET: 55 mL          PHYSICAL EXAM:    Constitutional: NAD  Neck:  +  JVD  Respiratory: CTAB/L  Cardiovascular: S1 and S2  Gastrointestinal: BS+, soft, NT/ND  Extremities: +  peripheral edema  Neurological: A/O x 3, no focal deficits  Psychiatric: Normal mood, normal affect  : +  Gustafson  Skin: No rashes  Access: Not applicable    LABS:                        9.7    19.5  )-----------( 238      ( 22 Mar 2019 00:39 )             31.5     03-22    131<L>  |  92<L>  |  19  ----------------------------<  113<H>  3.5   |  28  |  0.90    Ca    6.9<L>      22 Mar 2019 07:57  Phos  3.5     03-22  Mg     1.7     03-22    TPro  6.3  /  Alb  2.7<L>  /  TBili  4.6<H>  /  DBili  x   /  AST  38  /  ALT  27  /  AlkPhos  326<H>  03-22          Urine Studies:          RADIOLOGY & ADDITIONAL STUDIES:

## 2019-03-22 NOTE — CONSULT NOTE ADULT - ASSESSMENT
62 yo F h/o severe non ischemic cardiomyopathy (EF 10%, LVIDd 6.1 cm) identified in 2013, moderate-severe MR, severe TR, s/p AICD on home milrinone infusion via PICC, afib on aspirin & elliquis, DM2, HTN, thyroid cancer s/p thyroidectomy presents with fall, found to have stable subdural hematoma, and as well as Klebsiella bacteremia. On exam patient appears euvolemic.    # non ischemic cardiomyopathy - volume status appears close to euvolemia   - continue Milrinone 0.5 mcg/ kg/ min  - pt just off epi today  - will initiate hydralazine later in the hospital course  - transduce CVP , obtain central venous sat (pt with R IJ)   - hold off bumex for now     - plan to be discussed with Dr. Micah Kasper MD   Cardiology fellow 62 yo F h/o severe non ischemic cardiomyopathy (EF 10%, LVIDd 6.1 cm) identified in 2013, moderate-severe MR, severe TR, s/p AICD on home milrinone infusion via PICC, afib on aspirin & elliquis, DM2, HTN, thyroid cancer s/p thyroidectomy presents with fall, found to have stable subdural hematoma, and as well as Klebsiella bacteremia. On exam patient appears euvolemic.    # non ischemic cardiomyopathy - volume status appears close to euvolemia   - continue Milrinone 0.5 mcg/ kg/ min  - transduce CVP , obtain central venous sat (pt with R CVL in IJ)   - start Bumex 1 mg daily   - will have to continue discussing with patient importance of inotropes.   - f/u formal TTE    Stan Kasper MD   Cardiology fellow 64 yo F h/o severe non ischemic cardiomyopathy (EF 10%, LVIDd 6.1 cm) identified in 2013, moderate-severe MR, severe TR, s/p AICD on home milrinone infusion via PICC, afib on aspirin & elliquis, DM2, HTN, thyroid cancer s/p thyroidectomy presents with fall, found to have stable subdural hematoma, and as well as Klebsiella bacteremia. On exam patient appears mildly volume overloaded. Patient is a poor candidate for advanced therapies given lack of insight which may be secondary to poor cardiac output howvever she has also left AMA on multiple occasions.     # non ischemic cardiomyopathy - volume status appears close to euvolemia   - continue Milrinone 0.5 mcg/ kg/ min  - transduce CVP , central venous sat 46% (CI 1.9)  - start Bumex 1 mg daily   - standing weights daily   - will have to continue discussing with patient importance of inotropes as pt would like to have a "break" from it from fear of infection from PICC line again  - f/u formal TTE    Stan Kasper MD   Cardiology fellow

## 2019-03-22 NOTE — PROGRESS NOTE ADULT - SUBJECTIVE AND OBJECTIVE BOX
Patient is a 63y old  Female who presents with a chief complaint of mechanical fall (22 Mar 2019 08:16)    Being followed by ID for        Interval history:  No other acute events      ROS:  No cough,SOB,CP  No N/V/D  No abd pain  No urinary complaints  No HA  No joint or limb pain  No other complaints    PAST MEDICAL & SURGICAL HISTORY:  Asthma  CHF (congestive heart failure): with EF of 10% s/p AICD  DM (diabetes mellitus)  HTN (hypertension)  Thyroid ca  AICD (automatic cardioverter/defibrillator) present  S/P thyroidectomy    Allergies    Isordil (Headache)  penicillin (Rash)    Intolerances      Antimicrobials:    cefTRIAXone   IVPB      cefTRIAXone   IVPB 2 Gram(s) IV Intermittent once  rifaximin 550 milliGRAM(s) Oral two times a day    MEDICATIONS  (STANDING):  cefTRIAXone   IVPB      cefTRIAXone   IVPB 2 Gram(s) IV Intermittent once  chlorhexidine 4% Liquid 1 Application(s) Topical <User Schedule>  dextrose 5%. 1000 milliLiter(s) (50 mL/Hr) IV Continuous <Continuous>  dextrose 50% Injectable 12.5 Gram(s) IV Push once  dextrose 50% Injectable 25 Gram(s) IV Push once  dextrose 50% Injectable 25 Gram(s) IV Push once  insulin lispro (HumaLOG) corrective regimen sliding scale   SubCutaneous three times a day before meals  insulin lispro (HumaLOG) corrective regimen sliding scale   SubCutaneous at bedtime  investigational medication - general 50 milliGRAM(s) IV Push every 6 hours  investigational medication - IVPB 1500 milliGRAM(s) IV Intermittent every 6 hours  milrinone Infusion 0.5 MICROgram(s)/kG/Min (11.985 mL/Hr) IV Continuous <Continuous>  norepinephrine Infusion 0.05 MICROgram(s)/kG/Min (3.745 mL/Hr) IV Continuous <Continuous>  phytonadione  IVPB 10 milliGRAM(s) IV Intermittent daily  rifaximin 550 milliGRAM(s) Oral two times a day      Vital Signs Last 24 Hrs  T(C): 37 (03-22-19 @ 11:00), Max: 37.2 (03-22-19 @ 03:00)  T(F): 98.6 (03-22-19 @ 11:00), Max: 99 (03-22-19 @ 03:00)  HR: 109 (03-22-19 @ 12:00) (101 - 116)  BP: 86/59 (03-22-19 @ 12:00) (86/50 - 115/66)  BP(mean): 68 (03-22-19 @ 12:00) (63 - 92)  RR: 17 (03-22-19 @ 12:00) (13 - 30)  SpO2: 100% (03-22-19 @ 12:00) (90% - 100%)    Physical Exam:    Constitutional well preserved,comfortable,pleasant    HEENT PERRLA EOMI,No pallor or icterus    No oral exudate or erythema    Neck supple no JVD or LN    Chest Good AE,CTA    CVS RRR S1 S2 WNl No murmur or rub or gallop    Abd soft BS normal No tenderness no masses    Ext No cyanosis clubbing or edema    IV site no erythema tenderness or discharge    Joints no swelling or LOM    CNS AAO X 3 no focal    Lab Data:                          9.7    19.5  )-----------( 238      ( 22 Mar 2019 00:39 )             31.5       03-22    131<L>  |  92<L>  |  19  ----------------------------<  113<H>  3.5   |  28  |  0.90    Ca    6.9<L>      22 Mar 2019 07:57  Phos  3.5     03-22  Mg     1.7     03-22    TPro  6.3  /  Alb  2.7<L>  /  TBili  4.6<H>  /  DBili  x   /  AST  38  /  ALT  27  /  AlkPhos  326<H>  03-22          .Blood Blood-Peripheral 1 AEROBIC BOTTLE REC'D  03-20-19   Growth in aerobic bottle: Klebsiella pneumoniae  "Due to technical problems, Proteus sp. will Not be reported as part of  the BCID panel until further notice"  ***Blood Panel PCR results on this specimen are available  approximately 3 hours after the Gram stain result.***  Gram stain, PCR, and/or culture results may not always  correspond due to difference in methodologies.  ************************************************************  This PCR assay was performed using [employer].  The following targets are tested for: Enterococcus,  vancomycin resistant enterococci, Listeria monocytogenes,  coagulase negative staphylococci, S. aureus,  methicillin resistant S. aureus, Streptococcus agalactiae  (Group B), S. pneumoniae, S. pyogenes (Group A),  Acinetobacter baumannii, Enterobacter cloacae, E. coli,  Klebsiella oxytoca, K. pneumoniae, Proteus sp.,  Serratia marcescens, Haemophilus influenzae,  Neisseria meningitidis, Pseudomonas aeruginosa, Candida  albicans, C. glabrata, C krusei, C parapsilosis,  C. tropicalis and the KPC resistance gene.  --  Blood Culture PCR  Klebsiella pneumoniae      .Blood PICC Tip  03-20-19   Growth in aerobic bottle: Gram Negative Rods  See previous culture [id number]  --    Growth in aerobic bottle: Gram Negative Rods                    WBC Count: 19.5 (03-22-19 @ 00:39)  WBC Count: 28.5 (03-21-19 @ 17:33)  WBC Count: 38.6 (03-21-19 @ 04:27)  WBC Count: 56.2 (03-20-19 @ 17:51)  WBC Count: 61.0 (03-20-19 @ 16:09)  WBC Count: 14.64 (03-20-19 @ 08:44)  WBC Count: 27.0 (03-20-19 @ 08:41)  WBC Count: 20.8 (03-19-19 @ 20:16) Patient is a 63y old  Female who presents with a chief complaint of mechanical fall (22 Mar 2019 08:16)    Being followed by ID for bacteremia, leucocytosis        Interval history:  pt continues to improve  arteaga is uncomfortable  remainder ROS negative  No other acute events      PAST MEDICAL & SURGICAL HISTORY:  Asthma  CHF (congestive heart failure): with EF of 10% s/p AICD  DM (diabetes mellitus)  HTN (hypertension)  Thyroid ca  AICD (automatic cardioverter/defibrillator) present  S/P thyroidectomy    Allergies    Isordil (Headache)  penicillin (Rash)    Intolerances      Antimicrobials:    cefTRIAXone   IVPB      cefTRIAXone   IVPB 2 Gram(s) IV Intermittent once  rifaximin 550 milliGRAM(s) Oral two times a day    MEDICATIONS  (STANDING):  cefTRIAXone   IVPB      cefTRIAXone   IVPB 2 Gram(s) IV Intermittent once  chlorhexidine 4% Liquid 1 Application(s) Topical <User Schedule>  dextrose 5%. 1000 milliLiter(s) (50 mL/Hr) IV Continuous <Continuous>  dextrose 50% Injectable 12.5 Gram(s) IV Push once  dextrose 50% Injectable 25 Gram(s) IV Push once  dextrose 50% Injectable 25 Gram(s) IV Push once  insulin lispro (HumaLOG) corrective regimen sliding scale   SubCutaneous three times a day before meals  insulin lispro (HumaLOG) corrective regimen sliding scale   SubCutaneous at bedtime  investigational medication - general 50 milliGRAM(s) IV Push every 6 hours  investigational medication - IVPB 1500 milliGRAM(s) IV Intermittent every 6 hours  milrinone Infusion 0.5 MICROgram(s)/kG/Min (11.985 mL/Hr) IV Continuous <Continuous>  norepinephrine Infusion 0.05 MICROgram(s)/kG/Min (3.745 mL/Hr) IV Continuous <Continuous>  phytonadione  IVPB 10 milliGRAM(s) IV Intermittent daily  rifaximin 550 milliGRAM(s) Oral two times a day      Vital Signs Last 24 Hrs  T(C): 37 (03-22-19 @ 11:00), Max: 37.2 (03-22-19 @ 03:00)  T(F): 98.6 (03-22-19 @ 11:00), Max: 99 (03-22-19 @ 03:00)  HR: 109 (03-22-19 @ 12:00) (101 - 116)  BP: 86/59 (03-22-19 @ 12:00) (86/50 - 115/66)  BP(mean): 68 (03-22-19 @ 12:00) (63 - 92)  RR: 17 (03-22-19 @ 12:00) (13 - 30)  SpO2: 100% (03-22-19 @ 12:00) (90% - 100%)    Physical Exam:    Constitutional well preserved,comfortable,pleasant    HEENT PERRLA EOMI,No pallor or icterus    No oral exudate or erythema    Neck supple no JVD or LN    Chest Good AE,CTA    CVS S1 S2     Abd soft BS normal No tenderness     Ext No cyanosis clubbing or edema    IV site no erythema tenderness or discharge    Joints no swelling or LOM    CNS AAO X 3 no focal    Lab Data:                          9.7    19.5  )-----------( 238      ( 22 Mar 2019 00:39 )             31.5       03-22    131<L>  |  92<L>  |  19  ----------------------------<  113<H>  3.5   |  28  |  0.90    Ca    6.9<L>      22 Mar 2019 07:57  Phos  3.5     03-22  Mg     1.7     03-22    TPro  6.3  /  Alb  2.7<L>  /  TBili  4.6<H>  /  DBili  x   /  AST  38  /  ALT  27  /  AlkPhos  326<H>  03-22          .Blood Blood-Peripheral 1 AEROBIC BOTTLE REC'D  03-20-19   Growth in aerobic bottle: Klebsiella pneumoniae  "Due to technical problems, Proteus sp. will Not be reported as part of  the BCID panel until further notice"  ***Blood Panel PCR results on this specimen are available  approximately 3 hours after the Gram stain result.***  Gram stain, PCR, and/or culture results may not always  correspond due to difference in methodologies.  ************************************************************  This PCR assay was performed using [employer].  The following targets are tested for: Enterococcus,  vancomycin resistant enterococci, Listeria monocytogenes,  coagulase negative staphylococci, S. aureus,  methicillin resistant S. aureus, Streptococcus agalactiae  (Group B), S. pneumoniae, S. pyogenes (Group A),  Acinetobacter baumannii, Enterobacter cloacae, E. coli,  Klebsiella oxytoca, K. pneumoniae, Proteus sp.,  Serratia marcescens, Haemophilus influenzae,  Neisseria meningitidis, Pseudomonas aeruginosa, Candida  albicans, C. glabrata, C krusei, C parapsilosis,  C. tropicalis and the KPC resistance gene.  --  Blood Culture PCR  Klebsiella pneumoniae  Culture - Blood (03.20.19 @ 06:14)    Gram Stain:   Growth in aerobic bottle: Gram Negative Rods    -  Levofloxacin: S <=2    -  Meropenem: S <=1    -  Klebsiella pneumoniae: Detec    -  Ampicillin: R >16 These ampicillin results predict results for amoxicillin    -  Cefazolin: S <=8    -  Cefoxitin: S <=8    -  Amikacin: S <=16    -  Ampicillin/Sulbactam: S <=8/4    -  Aztreonam: S <=4    -  Cefepime: S <=4    -  Ceftriaxone: S <=1 Enterobacter, Citrobacter, and Serratia may develop resistance during prolonged therapy    -  Ciprofloxacin: S <=1    -  Ertapenem: S <=1    -  Gentamicin: S <=4    -  Imipenem: S <=1    -  Piperacillin/Tazobactam: S <=16    -  Tobramycin: S <=4    -  Trimethoprim/Sulfamethoxazole: S <=2/38    Specimen Source: .Blood Blood-Peripheral 1 AEROBIC BOTTLE REC'D    Organism: Blood Culture PCR    Organism: Klebsiella pneumoniae    Culture Results:   Growth in aerobic bottle: Klebsiella pneumoniae  "Due to technical problems, Proteus sp. will Not be reported as part of  the BCID panel until further notice"  ***Blood Panel PCR results on this specimen are available  approximately 3 hours after the Gram stain result.***  Gram stain, PCR, and/or culture results may not always  correspond due to difference in methodologies.  ************************************************************  This PCR assay was performed using [employer].  The following targets are tested for: Enterococcus,  vancomycin resistant enterococci, Listeria monocytogenes,  coagulase negative staphylococci, S. aureus,  methicillin resistant S. aureus, Streptococcus agalactiae  (Group B), S. pneumoniae, S. pyogenes (Group A),  Acinetobacter baumannii, Enterobacter cloacae, E. coli,  Klebsiella oxytoca, K. pneumoniae, Proteus sp.,  Serratia marcescens, Haemophilus influenzae,  Neisseria meningitidis, Pseudomonas aeruginosa, Candida  albicans, C. glabrata, C krusei, C parapsilosis,  C. tropicalis and the KPC resistance gene.    Organism Identification: Blood Culture PCR  Klebsiella pneumoniae    Method Type: PCR    Method Type: JOSÉ MIGUEL        .Blood PICC Tip  03-20-19   Growth in aerobic bottle: Gram Negative Rods  See previous culture [id number]  --    Growth in aerobic bottle: Gram Negative Rods                    WBC Count: 19.5 (03-22-19 @ 00:39)  WBC Count: 28.5 (03-21-19 @ 17:33)  WBC Count: 38.6 (03-21-19 @ 04:27)  WBC Count: 56.2 (03-20-19 @ 17:51)  WBC Count: 61.0 (03-20-19 @ 16:09)  WBC Count: 14.64 (03-20-19 @ 08:44)  WBC Count: 27.0 (03-20-19 @ 08:41)  WBC Count: 20.8 (03-19-19 @ 20:16)

## 2019-03-22 NOTE — DIETITIAN INITIAL EVALUATION ADULT. - PERTINENT LABORATORY DATA
(3/22): Hgb 9.7, Hct 31.5, POCT blood glucose 155, HgbA1c 5.4, Na 131, Cl 92, Glu 113, Ca 6.9, Alb 2.7

## 2019-03-22 NOTE — DIETITIAN INITIAL EVALUATION ADULT. - ENERGY NEEDS
Pt is a 64 yo F with multiple prior admission, well-known to nutrition team. Per review, pt with PMH: severe cardiomyopathy c/b CHF, A.Fib, DM2, HTN, thyroid cancer s/p thyroidectomy. Presented s/p mechanical fall, found to have SDH. Admitted to ICU for multifactorial shock likely cardiogenic and septic shock, possible pneumonia. Per review of chart, pt admitted to fever, chills, nausea, vomiting and abdominal cramps prior to coming to hospital.     Ht: 67"  Wt: 176.1 lbs  BMI: 27.6 kg/m2  IBW: 135 (+/-10%)    130 % IBW  Edema: None          Skin: 5 staples to right head laceration

## 2019-03-22 NOTE — DIETITIAN INITIAL EVALUATION ADULT. - ADHERENCE
good/Per review of RD note 3/1/19, pt reported that she followed a low-sodium diet at home by using no-salt seasonings and purchasing low sodium products; monitored fluid intake by sucking on sugar-free candy when thirsty, and eating ice rather than drinking water. Per review, RD provided consistent carbohydrate education. A1c (1/11/9): 6.7; current (3/22/19): 5.4. Notable improvement in A1c noted.

## 2019-03-22 NOTE — DIETITIAN INITIAL EVALUATION ADULT. - FACTORS AFF FOOD INTAKE
? Nausea/vomiting/abdominal pain - need to confirm with patient when available. Observed lunch tray at bedside, with ~50% consumed.

## 2019-03-22 NOTE — PROGRESS NOTE ADULT - SUBJECTIVE AND OBJECTIVE BOX
EP ATTENDING    tele: sinus tachycardia, biv-pacing    no palpitations, no syncope, no angina    benzocaine 15 mG/menthol 3.6 mG Lozenge 1 Lozenge Oral every 4 hours PRN  chlorhexidine 4% Liquid 1 Application(s) Topical <User Schedule>  dextrose 40% Gel 15 Gram(s) Oral once PRN  dextrose 5%. 1000 milliLiter(s) IV Continuous <Continuous>  dextrose 50% Injectable 12.5 Gram(s) IV Push once  dextrose 50% Injectable 25 Gram(s) IV Push once  dextrose 50% Injectable 25 Gram(s) IV Push once  glucagon  Injectable 1 milliGRAM(s) IntraMuscular once PRN  insulin lispro (HumaLOG) corrective regimen sliding scale   SubCutaneous three times a day before meals  insulin lispro (HumaLOG) corrective regimen sliding scale   SubCutaneous at bedtime  investigational medication - general 50 milliGRAM(s) IV Push every 6 hours  investigational medication - IVPB 1500 milliGRAM(s) IV Intermittent every 6 hours  meropenem  IVPB 1000 milliGRAM(s) IV Intermittent every 8 hours  milrinone Infusion 0.5 MICROgram(s)/kG/Min IV Continuous <Continuous>  norepinephrine Infusion 0.05 MICROgram(s)/kG/Min IV Continuous <Continuous>  phytonadione  IVPB 10 milliGRAM(s) IV Intermittent daily  rifaximin 550 milliGRAM(s) Oral two times a day                            9.7    19.5  )-----------( 238      ( 22 Mar 2019 00:39 )             31.5       03-22    126<L>  |  86<L>  |  25<H>  ----------------------------<  266<H>  3.8   |  25  |  1.00    Ca    7.1<L>      22 Mar 2019 00:39  Phos  3.3     03-22  Mg     1.6     03-22    TPro  7.0  /  Alb  2.6<L>  /  TBili  5.5<H>  /  DBili  x   /  AST  43<H>  /  ALT  31  /  AlkPhos  368<H>  03-22      CARDIAC MARKERS ( 20 Mar 2019 08:41 )  x     / x     / 90 U/L / x     / 2.4 ng/mL        T(C): 36.4 (03-22-19 @ 07:00), Max: 37.2 (03-22-19 @ 03:00)  HR: 105 (03-22-19 @ 07:45) (103 - 116)  BP: 98/57 (03-22-19 @ 07:45) (86/50 - 119/63)  RR: 16 (03-22-19 @ 07:45) (13 - 28)  SpO2: 96% (03-22-19 @ 07:45) (90% - 100%)  Wt(kg): --    JVP 8  tachy, no murmurs  CTAB  soft nt/nd  no c/c/e    3/20/19 device interrogation: unremarkable      A/P) She is a pleasant 64 y/o female PMH paroxysmal atrial fibrillation and a severe NICM (LVEF 10-15%) who had a [employer] Biv-ICD implanted at [place]. A LHC at [place] was also unremarkable. She now returns with a mechanical fall resulting in a subdural hematoma. A/C on hold as per neurosurgery. EP is now called for further recommendations and consideration for Watchman if she can't tolerate a/c long term. Review of her EKGs and tele show the rhythm is sinus with adequate BiV-tracking (RBBB in V1, negative in I/L). She hasn't had any more significant NSVT despite milrinone. There were no arrhythmic events on her device interrogation to explain her fall.    -medical therapy for CHF as per cardiology  -f/u neurosurgery regarding if/when anticoagulation for PAF with elevated chads-vasc can ever be restarted  -if she can't tolerate a/c long term then next step would be outpatient left atrial appendage occlusion with Watchman, but this requires at least 45 days of full anticoagulation, or at least DAPT as per ASAP-TOO trial.   -supportive care as per SICU  -will follow  -no specific EP intervention indicated at this time      Dariana Hallman M.D., [facility]  Cardiac Electrophysiology  [facility]  [address] [place] [address], [address] [place] [address]  [email or web address]    office [phone]  pager [phone]

## 2019-03-22 NOTE — PHYSICAL THERAPY INITIAL EVALUATION ADULT - PRECAUTIONS/LIMITATIONS, REHAB EVAL
RESULTS CONTINUED: CT Head: small acute hematoma along the R convexity. CT cervical spine: no acute changes. CT Chest/Abdomen/Thoracic spine: groundglass opacities in R middle and R lower lobes, which were noted on 11/6/2018. No other significant findings. RESULTS CONTINUED: CT Head: small acute hematoma along the R convexity. CT cervical spine: no acute changes. CT Chest/Abdomen/Thoracic spine: groundglass opacities in R middle and R lower lobes, which were noted on 11/6/2018. No other significant findings./cardiac precautions

## 2019-03-22 NOTE — PROGRESS NOTE ADULT - ASSESSMENT
64 yo F h/o severe cardiomyopathy c/b CHF w EF 10% s/p AICD on home milrinone infusion via PICC, afib on aspirin & elliquis, DM2, HTN, thyroid cancer s/p thyroidectomy presents for mechanical fall, found to have SDH w/ INR reversal with K-centra and IV Vitamin K admitted to MICU for multifactorial shock likely cardiogenic and septic shock possible PNA on levophed. Patient found with klebsiella bacteremia likely from catheter infection. Hospital course complicated by increasing bilirubin.     #Neuro  SDH s/p mechanical fall with head trauma i/s/o elliquis, last dose yesterday morning. Currently lethargic but arousable A&Ox3 w/o focal deficits, likely also component of acute metabolic encephalopathy i/s/o septic shock. Ammonia wnl. SDH stable on repeat CT head. S/p kaycentra gave IV vitamin K.   - frequent neuro checks  - appreciate neurosurgery recs  - No neuro deficits.   - Will get CT head if any mental status change.   - Elevated INR in the setting of liver injury, will give IV vitamin K x 3 doses for improvement of INR in the setting of recent bleed.     #CV  Severe cardiomyopathy c/b CHF s/p AICD on home milrinone infusion via PICC.  TTE from November 2018 w LVEF 10%.  Afib on aspirin & elliquis. Course c/b Vtach x 7 beats. Multifactorial shock w cardiogenic & septic shock from bacteremia.   - Titrated off levophed.   - c/w milrinone gtt  - TTE  - EP recs appreciated. Device interrogated. AF burden 0%. No interventions needed.   - HF consult for diuresis & optimization  - hold elliquis & aspirin i/s/o SDH  - monitor telemetry  - EKG - QTc 510 on March 21     #Pulm  CT chest w groundglass opacities consistent w PNA vs pulm edema vs malignancy. Satting well on room air.  - tx for possible PNA as below    #ID  On admission, with Septic shock in the setting of PNA c/b Kleb bacteremia.   - Blood culture positive for klebsiella.   - c/w vancomycin 1g q12h & monitor vanc trough  - c/w meropenem  - PICC line tip culture growing GNR.     #Renal  - Replete electrolytes prn.   - monitor BMP, replete for K > 4, Mg > 2    #GI  Congestive hepatopathy. Abd mildly distended, non-tender. Cholelithiasis on prior US. Lipase 140. Ammonia wnl.   - Elevated bilirubin, likely from hepatic congestion 2/2 CHF.   - Dash diet     #Endo  DM2  - Moderate SSI   Thyroid cancer s/p thyroidectomy  - TSH 6.1 and T4 1.2     #MSK  Mechanical fall down stairs w head trauma & LOC, found to have SDH, no other evidence of trauma on imaging.  - PT consult   - appreciate trauma surgery recs    #Heme  - DVT ppx: SCDs, hold pharmacological ppx  - tx SDH as above    #GOC  - Full code confirmed with pt.     Discussed with ICU attending.

## 2019-03-22 NOTE — DIETITIAN INITIAL EVALUATION ADULT. - NS AS NUTRI INTERV MEALS SNACK
1) Continue DASH/TLC diet as ordered. 2) Obtain subjective diet/wt hx as able. Pt unavailable for RD interview despite multiple attempts. 3) Assess need for diet education, RD to provide verbal/written education if pt amenable to same./General/healthful diet General/healthful diet/1) Continue DASH/TLC diet as ordered. 2) Obtain subjective diet/wt hx as able. Pt unavailable for RD interview despite multiple attempts. 3) Assess need for diet education, RD to provide verbal/written education if pt amenable to same. 4) Monitor nutrition related labs, skin integrity, fluid/hydration status.

## 2019-03-22 NOTE — PHYSICAL THERAPY INITIAL EVALUATION ADULT - PERTINENT HX OF CURRENT PROBLEM, REHAB EVAL
62 y/o F with h/o severe cardiomyopathy, cb/ CHF with EF 10% s/p AICD on home milrinone infusion vai PICC, afib on aspirin & eliquis, DM2, thyroid cancer s/p thyroidectomy presents s/p mechanical fall down 14 steps. Pt found to have SDH with INR reversal with K-centra and IV Vitamin K. Pt admitted to MICU for multifactorial shock likely cardiogenic and sepitc shock, possible PNA. Pt found with klebsiella bacteremia likely from catheter infection. Hospital course c/b increasing bilirubin

## 2019-03-22 NOTE — CHART NOTE - NSCHARTNOTEFT_GEN_A_CORE
MICU Transfer Note    Transfer from: MICU    Transfer to:  (  ) Medicine    ( x ) Telemetry    (  ) RCU    (  ) Palliative    (  ) Stroke Unit    (  ) _______________    Accepting physican:      MICU COURSE:  64 yo F h/o severe cardiomyopathy c/b CHF w EF 10% s/p AICD on home milrinone infusion via PICC, afib on aspirin & elliquis, DM2, HTN, thyroid cancer s/p thyroidectomy presents for mechanical fall, found to have SDH w/ INR reversal with K-centra and IV Vitamin K admitted to MICU for multifactorial shock likely cardiogenic and septic shock possible PNA on levophed. Patient found with klebsiella bacteremia likely from catheter infection. Elevated bilirubin possibly from hepatic congestion vs acute liver injury from hypotension, now improving. Patient was weaned off pressors 3/22 overnight. Blood culture was pan-sensitive, switch to ceftriaxone. EP saw patient with no afib burden on interrogation.       For Follow-Up:  [ ] HF recs. May need PICC line replaced.   [ ] Repeat blood cultures. MICU Transfer Note    Transfer from: MICU    Transfer to:  (  ) Medicine    ( x ) Telemetry    (  ) RCU    (  ) Palliative    (  ) Stroke Unit    (  ) _______________    Accepting physican:      MICU COURSE:  62 yo F h/o severe cardiomyopathy c/b CHF w EF 10% s/p AICD on home milrinone infusion via PICC, afib on aspirin & elliquis, DM2, HTN, thyroid cancer s/p thyroidectomy presents for mechanical fall, found to have SDH w/ INR reversal with K-centra and IV Vitamin K admitted to MICU for multifactorial shock likely cardiogenic and septic shock possible PNA on levophed. Patient found with klebsiella bacteremia likely from catheter infection. Elevated bilirubin possibly from hepatic congestion vs acute liver injury from hypotension, now improving. Patient was weaned off pressors 3/22 overnight. Blood culture was pan-sensitive, switch to ceftriaxone. EP saw patient with no afib burden on interrogation.       For Follow-Up:  [ ] HF recs. May need PICC line replaced.   [ ] Repeat blood cultures.  [ ] Patient need to follow up with neurosurgery as an outpatient as per trauma. MICU Transfer Note    Transfer from: MICU    Transfer to:  (  ) Medicine    ( x ) Telemetry    (  ) RCU    (  ) Palliative    (  ) Stroke Unit    (  ) _______________    Accepting physican:      MICU COURSE:  64 yo F h/o severe cardiomyopathy c/b CHF w EF 10% s/p AICD on home milrinone infusion via PICC, afib on aspirin & elliquis, DM2, HTN, thyroid cancer s/p thyroidectomy presents for mechanical fall, found to have SDH w/ INR reversal with K-centra and IV Vitamin K admitted to MICU for multifactorial shock likely cardiogenic and septic shock possible PNA on levophed. Patient found with klebsiella bacteremia likely from catheter infection. Elevated bilirubin possibly from hepatic congestion vs acute liver injury from hypotension, now improving. Patient was weaned off pressors 3/22 overnight. Blood culture was pan-sensitive, switch to ceftriaxone. EP saw patient with no afib burden on interrogation.       For Follow-Up:  [ ] Follow up HF recs.  [ ] DC CENTRAL LINE AND PLACE PICC LINE.   [ ] Follow up blood cultures.  [ ] Patient need to follow up with neurosurgery as an outpatient as per trauma.  [ ] AOX3. NO FOCAL DEFICITS. C/w neuro checks. If mental status changes get STAT HEAD CT and CALL NEURO SURGERY. MICU Transfer Note    Transfer from: MICU    Transfer to:  (  ) Medicine    ( x ) Telemetry    (  ) RCU    (  ) Palliative    (  ) Stroke Unit    (  ) _______________    Accepting physican: Tiara JENSEN.      MICU COURSE:  62 yo F h/o severe cardiomyopathy c/b CHF w EF 10% s/p AICD on home milrinone infusion via PICC, afib on aspirin & elliquis, DM2, HTN, thyroid cancer s/p thyroidectomy presents for mechanical fall, found to have SDH w/ INR reversal with K-centra and IV Vitamin K admitted to MICU for multifactorial shock likely cardiogenic and septic shock possible PNA on levophed. Patient found with klebsiella bacteremia likely from catheter infection. Elevated bilirubin possibly from hepatic congestion vs acute liver injury from hypotension, now improving. Patient was weaned off pressors 3/22 overnight. Blood culture was pan-sensitive, switch to ceftriaxone. EP saw patient with no afib burden on interrogation.       For Follow-Up:  [ ] Follow up HF recs.  [ ] DC CENTRAL LINE AND PLACE PICC LINE.   [ ] Follow up blood cultures.  [ ] Patient need to follow up with neurosurgery as an outpatient as per trauma.  [ ] AOX3. NO FOCAL DEFICITS. C/w neuro checks. If mental status changes get STAT HEAD CT and CALL NEURO SURGERY.

## 2019-03-22 NOTE — PROGRESS NOTE ADULT - SUBJECTIVE AND OBJECTIVE BOX
Subjective: pt seen and examined, no complaints, ROS - .   Tolerating Primacor gtt at .5 mcg,   weaned off pressor for past few hours       benzocaine 15 mG/menthol 3.6 mG Lozenge 1 Lozenge Oral every 4 hours PRN  chlorhexidine 4% Liquid 1 Application(s) Topical <User Schedule>  dextrose 40% Gel 15 Gram(s) Oral once PRN  dextrose 5%. 1000 milliLiter(s) IV Continuous <Continuous>  dextrose 50% Injectable 12.5 Gram(s) IV Push once  dextrose 50% Injectable 25 Gram(s) IV Push once  dextrose 50% Injectable 25 Gram(s) IV Push once  glucagon  Injectable 1 milliGRAM(s) IntraMuscular once PRN  insulin lispro (HumaLOG) corrective regimen sliding scale   SubCutaneous three times a day before meals  insulin lispro (HumaLOG) corrective regimen sliding scale   SubCutaneous at bedtime  investigational medication - general 50 milliGRAM(s) IV Push every 6 hours  investigational medication - IVPB 1500 milliGRAM(s) IV Intermittent every 6 hours  meropenem  IVPB 1000 milliGRAM(s) IV Intermittent every 8 hours  milrinone Infusion 0.5 MICROgram(s)/kG/Min IV Continuous <Continuous>  norepinephrine Infusion 0.05 MICROgram(s)/kG/Min IV Continuous <Continuous>  phytonadione  IVPB 10 milliGRAM(s) IV Intermittent daily  rifaximin 550 milliGRAM(s) Oral two times a day                            9.7    19.5  )-----------( 238      ( 22 Mar 2019 00:39 )             31.5       Hemoglobin: 9.7 g/dL (03-22 @ 00:39)  Hemoglobin: 10.3 g/dL (03-21 @ 17:33)  Hemoglobin: 9.9 g/dL (03-21 @ 04:27)  Hemoglobin: 9.9 g/dL (03-20 @ 17:51)  Hemoglobin: 10.3 g/dL (03-20 @ 16:09)      03-22    126<L>  |  86<L>  |  25<H>  ----------------------------<  266<H>  3.8   |  25  |  1.00    Ca    7.1<L>      22 Mar 2019 00:39  Phos  3.3     03-22  Mg     1.6     03-22    TPro  7.0  /  Alb  2.6<L>  /  TBili  5.5<H>  /  DBili  x   /  AST  43<H>  /  ALT  31  /  AlkPhos  368<H>  03-22    Creatinine Trend: 1.00<--, 1.02<--, 1.11<--, 1.19<--, 1.38<--, 1.58<--    COAGS: PT/INR - ( 22 Mar 2019 00:39 )   PT: 26.0 sec;   INR: 2.22 ratio         PTT - ( 22 Mar 2019 00:39 )  PTT:29.3 sec    CARDIAC MARKERS ( 20 Mar 2019 08:41 )  x     / x     / 90 U/L / x     / 2.4 ng/mL        T(C): 37.2 (03-22-19 @ 03:00), Max: 37.2 (03-22-19 @ 03:00)  HR: 104 (03-22-19 @ 06:30) (103 - 116)  BP: 103/56 (03-22-19 @ 06:30) (86/50 - 119/63)  RR: 18 (03-22-19 @ 06:30) (13 - 28)  SpO2: 97% (03-22-19 @ 06:30) (90% - 100%)  Wt(kg): --    I&O's Summary    21 Mar 2019 07:01  -  22 Mar 2019 07:00  --------------------------------------------------------  IN: 1437.8 mL / OUT: 2152 mL / NET: -714.2 mL      	  Lymphatic: No lymphadenopathy trace edema in LE bilaterally   Cardiovascular: Normal S1 S2, + JVD, No murmurs , Peripheral pulses palpable 2+ bilaterally  Respiratory: Lungs clear to auscultation  Gastrointestinal:  Soft, Non-tender, + BS	  Skin: No rashes, No ecchymoses, No cyanosis, warm to touch  Psychiatry:  Mood & affect appropriate      TELEMETRY: Afib       ECG: < from: 12 Lead ECG (03.20.19 @ 05:58) >  WIDE QRS TACHYCARDIA  LEFT AXIS DEVIATION  RIGHT BUNDLE BRANCH BLOCK  LEFT VENTRICULAR HYPERTROPHY WITH REPOLARIZATION ABNORMALITY  INFERIOR INFARCT , AGE UNDETERMINED  ABNORMAL ECG    < end of copied text >    	  RADIOLOGY:  OTHER:     DIAGNOSTIC TESTING:  [ ] Echocardiogram: < from: TTE with Doppler (w/Cont) (11.07.18 @ 05:53) >  Conclusions:  1. Tethered mitral valve leaflets with normal opening.  Mitral annular calcification and thickened  mitral leaflet  tips Moderate mitral regurgitation.  2. Calcified trileaflet aortic valve with normal opening.  3. Moderately dilated left atrium.  LA volume index = 43  cc/m2.  4. Eccentric left ventricular hypertrophy (dilated left  ventricle with normal relative wall thickness).  5. Severe global left ventricularsystolic dysfunction.  Endocardial visualization enhanced with intravenous  injection of Ultrasonic Enhancing Agent (Definity). No LV  thrombus.  Septal flattening consistent with right  ventricular overload.  6. Severe diastolic dysfunction with elevated filling  pressures  7. Severe right atrial enlargement.  8. Right ventricular enlargement with decreased right  ventricular systolic function.  A device wire is noted in  the right heart.  9. Dilated tricuspid annulus with malcoaptation of  tricuspid leaflets. Severe tricuspid regurgitation.  *** No previous Echo exam.    < end of copied text >    [ ]  Catheterization:  [ ] Stress Test:    	         ASSESSMENT/PLAN:  62 yo F with history of severe NICM s/p AICD on home milrinone, AF on ac with eliquis, HTN, DM, thyroid CA s/p thyroidectomy who is being seen for heart failure and cardiomyopathy.    cont to hold ASA at present  , restart once cleared by SX   Neurosx following for SDH   ABX per ICU   continue with inotropic support with milrinone  continue with supportive care per SICU  D/W Dr Hallman Subjective: pt seen and examined, no complaints, ROS - .   Tolerating Primacor gtt at .5 mcg,   weaned off pressor for past few hours       benzocaine 15 mG/menthol 3.6 mG Lozenge 1 Lozenge Oral every 4 hours PRN  chlorhexidine 4% Liquid 1 Application(s) Topical <User Schedule>  dextrose 40% Gel 15 Gram(s) Oral once PRN  dextrose 5%. 1000 milliLiter(s) IV Continuous <Continuous>  dextrose 50% Injectable 12.5 Gram(s) IV Push once  dextrose 50% Injectable 25 Gram(s) IV Push once  dextrose 50% Injectable 25 Gram(s) IV Push once  glucagon  Injectable 1 milliGRAM(s) IntraMuscular once PRN  insulin lispro (HumaLOG) corrective regimen sliding scale   SubCutaneous three times a day before meals  insulin lispro (HumaLOG) corrective regimen sliding scale   SubCutaneous at bedtime  investigational medication - general 50 milliGRAM(s) IV Push every 6 hours  investigational medication - IVPB 1500 milliGRAM(s) IV Intermittent every 6 hours  meropenem  IVPB 1000 milliGRAM(s) IV Intermittent every 8 hours  milrinone Infusion 0.5 MICROgram(s)/kG/Min IV Continuous <Continuous>  norepinephrine Infusion 0.05 MICROgram(s)/kG/Min IV Continuous <Continuous>  phytonadione  IVPB 10 milliGRAM(s) IV Intermittent daily  rifaximin 550 milliGRAM(s) Oral two times a day                            9.7    19.5  )-----------( 238      ( 22 Mar 2019 00:39 )             31.5       Hemoglobin: 9.7 g/dL (03-22 @ 00:39)  Hemoglobin: 10.3 g/dL (03-21 @ 17:33)  Hemoglobin: 9.9 g/dL (03-21 @ 04:27)  Hemoglobin: 9.9 g/dL (03-20 @ 17:51)  Hemoglobin: 10.3 g/dL (03-20 @ 16:09)      03-22    126<L>  |  86<L>  |  25<H>  ----------------------------<  266<H>  3.8   |  25  |  1.00    Ca    7.1<L>      22 Mar 2019 00:39  Phos  3.3     03-22  Mg     1.6     03-22    TPro  7.0  /  Alb  2.6<L>  /  TBili  5.5<H>  /  DBili  x   /  AST  43<H>  /  ALT  31  /  AlkPhos  368<H>  03-22    Creatinine Trend: 1.00<--, 1.02<--, 1.11<--, 1.19<--, 1.38<--, 1.58<--    COAGS: PT/INR - ( 22 Mar 2019 00:39 )   PT: 26.0 sec;   INR: 2.22 ratio         PTT - ( 22 Mar 2019 00:39 )  PTT:29.3 sec    CARDIAC MARKERS ( 20 Mar 2019 08:41 )  x     / x     / 90 U/L / x     / 2.4 ng/mL        T(C): 37.2 (03-22-19 @ 03:00), Max: 37.2 (03-22-19 @ 03:00)  HR: 104 (03-22-19 @ 06:30) (103 - 116)  BP: 103/56 (03-22-19 @ 06:30) (86/50 - 119/63)  RR: 18 (03-22-19 @ 06:30) (13 - 28)  SpO2: 97% (03-22-19 @ 06:30) (90% - 100%)  Wt(kg): --    I&O's Summary    21 Mar 2019 07:01  -  22 Mar 2019 07:00  --------------------------------------------------------  IN: 1437.8 mL / OUT: 2152 mL / NET: -714.2 mL      	  Lymphatic: No lymphadenopathy trace edema in LE bilaterally   Cardiovascular: Normal S1 S2, + JVD, No murmurs , Peripheral pulses palpable 2+ bilaterally  Respiratory: Lungs clear to auscultation  Gastrointestinal:  Soft, Non-tender, + BS	  Skin: No rashes, No ecchymoses, No cyanosis, warm to touch  Psychiatry:  Mood & affect appropriate      TELEMETRY: Afib       ECG: < from: 12 Lead ECG (03.20.19 @ 05:58) >  WIDE QRS TACHYCARDIA  LEFT AXIS DEVIATION  RIGHT BUNDLE BRANCH BLOCK  LEFT VENTRICULAR HYPERTROPHY WITH REPOLARIZATION ABNORMALITY  INFERIOR INFARCT , AGE UNDETERMINED  ABNORMAL ECG    < end of copied text >    	  RADIOLOGY:  OTHER:     DIAGNOSTIC TESTING:  [ ] Echocardiogram: < from: TTE with Doppler (w/Cont) (11.07.18 @ 05:53) >  Conclusions:  1. Tethered mitral valve leaflets with normal opening.  Mitral annular calcification and thickened  mitral leaflet  tips Moderate mitral regurgitation.  2. Calcified trileaflet aortic valve with normal opening.  3. Moderately dilated left atrium.  LA volume index = 43  cc/m2.  4. Eccentric left ventricular hypertrophy (dilated left  ventricle with normal relative wall thickness).  5. Severe global left ventricularsystolic dysfunction.  Endocardial visualization enhanced with intravenous  injection of Ultrasonic Enhancing Agent (Definity). No LV  thrombus.  Septal flattening consistent with right  ventricular overload.  6. Severe diastolic dysfunction with elevated filling  pressures  7. Severe right atrial enlargement.  8. Right ventricular enlargement with decreased right  ventricular systolic function.  A device wire is noted in  the right heart.  9. Dilated tricuspid annulus with malcoaptation of  tricuspid leaflets. Severe tricuspid regurgitation.  *** No previous Echo exam.    < end of copied text >    [ ]  Catheterization:  [ ] Stress Test:    	         ASSESSMENT/PLAN:  64 yo F with history of severe NICM s/p ICD on home milrinone, AF on ac with eliquis, HTN, DM, thyroid CA s/p thyroidectomy who is being seen for heart failure and cardiomyopathy.    cont to hold ASA / Eliquis at present  , restart once cleared by SX   Neurosx following for SDH   ABX per ICU   continue with inotropic support with milrinone  continue with supportive care per SICU  D/W Dr Grimes

## 2019-03-22 NOTE — DIETITIAN INITIAL EVALUATION ADULT. - PT NOT SOURCE
Pt unavailable for RD interview despite x 3 visits; receiving medical procedures/with medical teams at time of RD visit. Pt well-known to nutrition team, as noted with multiple prior admissions./other (specify)

## 2019-03-22 NOTE — DIETITIAN INITIAL EVALUATION ADULT. - ORAL INTAKE PTA
Per review of RD note 3/1/19, pt reported good appetite and PO intake PTA. Noted with enjoyment of fruit, cereal, turkey, sandwiches, soup and fish, vegetables and 1 Ensure daily. Noted with NFKA, hx of taking calcitriol and calcium/Vit D PTA. Per review, pt noted with nausea, vomiting and abdominal cramps prior to coming to hospital.

## 2019-03-23 LAB
ALBUMIN SERPL ELPH-MCNC: 2.6 G/DL — LOW (ref 3.3–5)
ALBUMIN SERPL ELPH-MCNC: 2.8 G/DL — LOW (ref 3.3–5)
ALP SERPL-CCNC: 356 U/L — HIGH (ref 40–120)
ALP SERPL-CCNC: 374 U/L — HIGH (ref 40–120)
ALT FLD-CCNC: 25 U/L — SIGNIFICANT CHANGE UP (ref 10–45)
ALT FLD-CCNC: 29 U/L — SIGNIFICANT CHANGE UP (ref 10–45)
ANION GAP SERPL CALC-SCNC: 15 MMOL/L — SIGNIFICANT CHANGE UP (ref 5–17)
ANION GAP SERPL CALC-SCNC: 15 MMOL/L — SIGNIFICANT CHANGE UP (ref 5–17)
ANISOCYTOSIS BLD QL: SLIGHT — SIGNIFICANT CHANGE UP
AST SERPL-CCNC: 41 U/L — HIGH (ref 10–40)
AST SERPL-CCNC: 43 U/L — HIGH (ref 10–40)
BASOPHILS # BLD AUTO: 0.16 K/UL — SIGNIFICANT CHANGE UP (ref 0–0.2)
BASOPHILS # BLD AUTO: 0.34 K/UL — HIGH (ref 0–0.2)
BASOPHILS NFR BLD AUTO: 1.9 % — SIGNIFICANT CHANGE UP (ref 0–2)
BASOPHILS NFR BLD AUTO: 3.4 % — HIGH (ref 0–2)
BILIRUB SERPL-MCNC: 3.7 MG/DL — HIGH (ref 0.2–1.2)
BILIRUB SERPL-MCNC: 3.8 MG/DL — HIGH (ref 0.2–1.2)
BUN SERPL-MCNC: 14 MG/DL — SIGNIFICANT CHANGE UP (ref 7–23)
BUN SERPL-MCNC: 14 MG/DL — SIGNIFICANT CHANGE UP (ref 7–23)
CALCIUM SERPL-MCNC: 6.6 MG/DL — LOW (ref 8.4–10.5)
CALCIUM SERPL-MCNC: 6.7 MG/DL — LOW (ref 8.4–10.5)
CHLORIDE SERPL-SCNC: 88 MMOL/L — LOW (ref 96–108)
CHLORIDE SERPL-SCNC: 91 MMOL/L — LOW (ref 96–108)
CO2 SERPL-SCNC: 24 MMOL/L — SIGNIFICANT CHANGE UP (ref 22–31)
CO2 SERPL-SCNC: 25 MMOL/L — SIGNIFICANT CHANGE UP (ref 22–31)
CREAT ?TM UR-MCNC: 87 MG/DL — SIGNIFICANT CHANGE UP
CREAT SERPL-MCNC: 0.74 MG/DL — SIGNIFICANT CHANGE UP (ref 0.5–1.3)
CREAT SERPL-MCNC: 0.85 MG/DL — SIGNIFICANT CHANGE UP (ref 0.5–1.3)
ELLIPTOCYTES BLD QL SMEAR: SLIGHT — SIGNIFICANT CHANGE UP
EOSINOPHIL # BLD AUTO: 0.07 K/UL — SIGNIFICANT CHANGE UP (ref 0–0.5)
EOSINOPHIL # BLD AUTO: 0.09 K/UL — SIGNIFICANT CHANGE UP (ref 0–0.5)
EOSINOPHIL NFR BLD AUTO: 0.8 % — SIGNIFICANT CHANGE UP (ref 0–6)
EOSINOPHIL NFR BLD AUTO: 0.9 % — SIGNIFICANT CHANGE UP (ref 0–6)
GLUCOSE BLDC GLUCOMTR-MCNC: 122 MG/DL — HIGH (ref 70–99)
GLUCOSE BLDC GLUCOMTR-MCNC: 173 MG/DL — HIGH (ref 70–99)
GLUCOSE BLDC GLUCOMTR-MCNC: 197 MG/DL — HIGH (ref 70–99)
GLUCOSE BLDC GLUCOMTR-MCNC: 247 MG/DL — HIGH (ref 70–99)
GLUCOSE SERPL-MCNC: 126 MG/DL — HIGH (ref 70–99)
GLUCOSE SERPL-MCNC: 271 MG/DL — HIGH (ref 70–99)
HCT VFR BLD CALC: 27.8 % — LOW (ref 34.5–45)
HCT VFR BLD CALC: 29.4 % — LOW (ref 34.5–45)
HGB BLD-MCNC: 9.1 G/DL — LOW (ref 11.5–15.5)
HGB BLD-MCNC: 9.4 G/DL — LOW (ref 11.5–15.5)
HYPOCHROMIA BLD QL: SLIGHT — SIGNIFICANT CHANGE UP
IMM GRANULOCYTES NFR BLD AUTO: 4 % — HIGH (ref 0–1.5)
LACTATE SERPL-SCNC: 1.6 MMOL/L — SIGNIFICANT CHANGE UP (ref 0.7–2)
LYMPHOCYTES # BLD AUTO: 0.53 K/UL — LOW (ref 1–3.3)
LYMPHOCYTES # BLD AUTO: 1.55 K/UL — SIGNIFICANT CHANGE UP (ref 1–3.3)
LYMPHOCYTES # BLD AUTO: 18.3 % — SIGNIFICANT CHANGE UP (ref 13–44)
LYMPHOCYTES # BLD AUTO: 5.2 % — LOW (ref 13–44)
MACROCYTES BLD QL: SLIGHT — SIGNIFICANT CHANGE UP
MAGNESIUM SERPL-MCNC: 1.5 MG/DL — LOW (ref 1.6–2.6)
MANUAL SMEAR VERIFICATION: SIGNIFICANT CHANGE UP
MCHC RBC-ENTMCNC: 23.3 PG — LOW (ref 27–34)
MCHC RBC-ENTMCNC: 23.5 PG — LOW (ref 27–34)
MCHC RBC-ENTMCNC: 32 GM/DL — SIGNIFICANT CHANGE UP (ref 32–36)
MCHC RBC-ENTMCNC: 32.7 GM/DL — SIGNIFICANT CHANGE UP (ref 32–36)
MCV RBC AUTO: 71.8 FL — LOW (ref 80–100)
MCV RBC AUTO: 73 FL — LOW (ref 80–100)
MICROCYTES BLD QL: SLIGHT — SIGNIFICANT CHANGE UP
MONOCYTES # BLD AUTO: 0.87 K/UL — SIGNIFICANT CHANGE UP (ref 0–0.9)
MONOCYTES # BLD AUTO: 0.97 K/UL — HIGH (ref 0–0.9)
MONOCYTES NFR BLD AUTO: 11.5 % — SIGNIFICANT CHANGE UP (ref 2–14)
MONOCYTES NFR BLD AUTO: 8.6 % — SIGNIFICANT CHANGE UP (ref 2–14)
MYELOCYTES NFR BLD: 1.7 % — HIGH (ref 0–0)
NEUTROPHILS # BLD AUTO: 5.38 K/UL — SIGNIFICANT CHANGE UP (ref 1.8–7.4)
NEUTROPHILS # BLD AUTO: 8.12 K/UL — HIGH (ref 1.8–7.4)
NEUTROPHILS NFR BLD AUTO: 63.5 % — SIGNIFICANT CHANGE UP (ref 43–77)
NEUTROPHILS NFR BLD AUTO: 80.2 % — HIGH (ref 43–77)
NRBC # BLD: 2 /100 — HIGH (ref 0–0)
OSMOLALITY SERPL: 270 MOS/KG — LOW (ref 289–308)
OSMOLALITY UR: 276 MOS/KG — SIGNIFICANT CHANGE UP (ref 50–1200)
PHOSPHATE SERPL-MCNC: 4.3 MG/DL — SIGNIFICANT CHANGE UP (ref 2.5–4.5)
PLAT MORPH BLD: NORMAL — SIGNIFICANT CHANGE UP
PLATELET # BLD AUTO: 233 K/UL — SIGNIFICANT CHANGE UP (ref 150–400)
PLATELET # BLD AUTO: 239 K/UL — SIGNIFICANT CHANGE UP (ref 150–400)
POIKILOCYTOSIS BLD QL AUTO: SLIGHT — SIGNIFICANT CHANGE UP
POLYCHROMASIA BLD QL SMEAR: SLIGHT — SIGNIFICANT CHANGE UP
POTASSIUM SERPL-MCNC: 3.6 MMOL/L — SIGNIFICANT CHANGE UP (ref 3.5–5.3)
POTASSIUM SERPL-MCNC: 3.8 MMOL/L — SIGNIFICANT CHANGE UP (ref 3.5–5.3)
POTASSIUM SERPL-SCNC: 3.6 MMOL/L — SIGNIFICANT CHANGE UP (ref 3.5–5.3)
POTASSIUM SERPL-SCNC: 3.8 MMOL/L — SIGNIFICANT CHANGE UP (ref 3.5–5.3)
PROT SERPL-MCNC: 6.7 G/DL — SIGNIFICANT CHANGE UP (ref 6–8.3)
PROT SERPL-MCNC: 7 G/DL — SIGNIFICANT CHANGE UP (ref 6–8.3)
RBC # BLD: 3.87 M/UL — SIGNIFICANT CHANGE UP (ref 3.8–5.2)
RBC # BLD: 4.03 M/UL — SIGNIFICANT CHANGE UP (ref 3.8–5.2)
RBC # FLD: 24.4 % — HIGH (ref 10.3–14.5)
RBC # FLD: 24.6 % — HIGH (ref 10.3–14.5)
RBC BLD AUTO: ABNORMAL
SODIUM SERPL-SCNC: 128 MMOL/L — LOW (ref 135–145)
SODIUM SERPL-SCNC: 130 MMOL/L — LOW (ref 135–145)
SODIUM UR-SCNC: 22 MMOL/L — SIGNIFICANT CHANGE UP
TARGETS BLD QL SMEAR: SLIGHT — SIGNIFICANT CHANGE UP
URATE UR-MCNC: 34.8 MG/DL — SIGNIFICANT CHANGE UP
UUN UR-MCNC: 322 MG/DL — SIGNIFICANT CHANGE UP
WBC # BLD: 10.12 K/UL — SIGNIFICANT CHANGE UP (ref 3.8–10.5)
WBC # BLD: 8.47 K/UL — SIGNIFICANT CHANGE UP (ref 3.8–10.5)
WBC # FLD AUTO: 10.12 K/UL — SIGNIFICANT CHANGE UP (ref 3.8–10.5)
WBC # FLD AUTO: 8.47 K/UL — SIGNIFICANT CHANGE UP (ref 3.8–10.5)

## 2019-03-23 PROCEDURE — 93010 ELECTROCARDIOGRAM REPORT: CPT

## 2019-03-23 RX ORDER — BUMETANIDE 0.25 MG/ML
1 INJECTION INTRAMUSCULAR; INTRAVENOUS ONCE
Qty: 0 | Refills: 0 | Status: COMPLETED | OUTPATIENT
Start: 2019-03-23 | End: 2019-03-23

## 2019-03-23 RX ORDER — BUMETANIDE 0.25 MG/ML
2 INJECTION INTRAMUSCULAR; INTRAVENOUS DAILY
Qty: 0 | Refills: 0 | Status: DISCONTINUED | OUTPATIENT
Start: 2019-03-24 | End: 2019-03-26

## 2019-03-23 RX ORDER — MAGNESIUM SULFATE 500 MG/ML
1 VIAL (ML) INJECTION ONCE
Qty: 0 | Refills: 0 | Status: COMPLETED | OUTPATIENT
Start: 2019-03-23 | End: 2019-03-23

## 2019-03-23 RX ADMIN — Medication 2: at 12:02

## 2019-03-23 RX ADMIN — GABAPENTIN 100 MILLIGRAM(S): 400 CAPSULE ORAL at 06:10

## 2019-03-23 RX ADMIN — CEFTRIAXONE 100 GRAM(S): 500 INJECTION, POWDER, FOR SOLUTION INTRAMUSCULAR; INTRAVENOUS at 12:02

## 2019-03-23 RX ADMIN — MILRINONE LACTATE 11.98 MICROGRAM(S)/KG/MIN: 1 INJECTION, SOLUTION INTRAVENOUS at 12:02

## 2019-03-23 RX ADMIN — BENZOCAINE AND MENTHOL 1 LOZENGE: 5; 1 LIQUID ORAL at 12:03

## 2019-03-23 RX ADMIN — BUMETANIDE 1 MILLIGRAM(S): 0.25 INJECTION INTRAMUSCULAR; INTRAVENOUS at 12:02

## 2019-03-23 RX ADMIN — BUMETANIDE 1 MILLIGRAM(S): 0.25 INJECTION INTRAMUSCULAR; INTRAVENOUS at 17:55

## 2019-03-23 RX ADMIN — Medication 4: at 17:55

## 2019-03-23 RX ADMIN — Medication 102 MILLIGRAM(S): at 12:02

## 2019-03-23 RX ADMIN — MILRINONE LACTATE 11.98 MICROGRAM(S)/KG/MIN: 1 INJECTION, SOLUTION INTRAVENOUS at 08:09

## 2019-03-23 RX ADMIN — MILRINONE LACTATE 11.98 MICROGRAM(S)/KG/MIN: 1 INJECTION, SOLUTION INTRAVENOUS at 21:31

## 2019-03-23 RX ADMIN — GABAPENTIN 100 MILLIGRAM(S): 400 CAPSULE ORAL at 17:55

## 2019-03-23 RX ADMIN — Medication 100 GRAM(S): at 12:13

## 2019-03-23 NOTE — PROGRESS NOTE ADULT - SUBJECTIVE AND OBJECTIVE BOX
Subjective: pt seen and examined, no complaints, ROS - .   Tolerating Primacor gtt     benzocaine 15 mG/menthol 3.6 mG Lozenge 1 Lozenge Oral every 4 hours PRN  buMETAnide 1 milliGRAM(s) Oral daily  cefTRIAXone   IVPB      cefTRIAXone   IVPB 2 Gram(s) IV Intermittent every 24 hours  chlorhexidine 4% Liquid 1 Application(s) Topical <User Schedule>  dextrose 40% Gel 15 Gram(s) Oral once PRN  dextrose 5%. 1000 milliLiter(s) IV Continuous <Continuous>  dextrose 50% Injectable 12.5 Gram(s) IV Push once  dextrose 50% Injectable 25 Gram(s) IV Push once  dextrose 50% Injectable 25 Gram(s) IV Push once  gabapentin 100 milliGRAM(s) Oral every 12 hours  glucagon  Injectable 1 milliGRAM(s) IntraMuscular once PRN  insulin lispro (HumaLOG) corrective regimen sliding scale   SubCutaneous three times a day before meals  insulin lispro (HumaLOG) corrective regimen sliding scale   SubCutaneous at bedtime  milrinone Infusion 0.5 MICROgram(s)/kG/Min IV Continuous <Continuous>  rifaximin 550 milliGRAM(s) Oral two times a day                            9.1    10.12 )-----------( 233      ( 23 Mar 2019 09:49 )             27.8       Hemoglobin: 9.1 g/dL (03-23 @ 09:49)  Hemoglobin: 9.7 g/dL (03-22 @ 00:39)  Hemoglobin: 10.3 g/dL (03-21 @ 17:33)  Hemoglobin: 9.9 g/dL (03-21 @ 04:27)  Hemoglobin: 9.9 g/dL (03-20 @ 17:51)      03-23    130<L>  |  91<L>  |  14  ----------------------------<  126<H>  3.8   |  24  |  0.85    Ca    6.7<L>      23 Mar 2019 06:50  Phos  4.3     03-23  Mg     1.5     03-23    TPro  6.7  /  Alb  2.6<L>  /  TBili  3.8<H>  /  DBili  x   /  AST  41<H>  /  ALT  25  /  AlkPhos  356<H>  03-23    Creatinine Trend: 0.85<--, 0.82<--, 0.90<--, 1.00<--, 1.02<--, 1.11<--    COAGS:           T(C): 36.5 (03-23-19 @ 13:30), Max: 37.4 (03-23-19 @ 00:17)  HR: 110 (03-23-19 @ 13:30) (101 - 118)  BP: 96/66 (03-23-19 @ 13:30) (96/66 - 118/76)  RR: 16 (03-23-19 @ 13:30) (16 - 40)  SpO2: 99% (03-23-19 @ 13:30) (98% - 100%)  Wt(kg): --    I&O's Summary    22 Mar 2019 07:01  -  23 Mar 2019 07:00  --------------------------------------------------------  IN: 1608 mL / OUT: 1140 mL / NET: 468 mL    23 Mar 2019 07:01  -  23 Mar 2019 14:13  --------------------------------------------------------  IN: 560 mL / OUT: 250 mL / NET: 310 mL      	  Lymphatic: No lymphadenopathy trace edema in LE bilaterally   Cardiovascular: Normal S1 S2, + JVD, No murmurs , Peripheral pulses palpable 2+ bilaterally  Respiratory: Lungs clear to auscultation  Gastrointestinal:  Soft, Non-tender, + BS	  Skin: No rashes, No ecchymoses, No cyanosis, warm to touch  Psychiatry:  Mood & affect appropriate      TELEMETRY: Afib       ECG: < from: 12 Lead ECG (03.20.19 @ 05:58) >  WIDE QRS TACHYCARDIA  LEFT AXIS DEVIATION  RIGHT BUNDLE BRANCH BLOCK  LEFT VENTRICULAR HYPERTROPHY WITH REPOLARIZATION ABNORMALITY  INFERIOR INFARCT , AGE UNDETERMINED  ABNORMAL ECG    < end of copied text >    	  RADIOLOGY:  OTHER:     DIAGNOSTIC TESTING:  [ ] Echocardiogram: < from: TTE with Doppler (w/Cont) (11.07.18 @ 05:53) >  Conclusions:  1. Tethered mitral valve leaflets with normal opening.  Mitral annular calcification and thickened  mitral leaflet  tips Moderate mitral regurgitation.  2. Calcified trileaflet aortic valve with normal opening.  3. Moderately dilated left atrium.  LA volume index = 43  cc/m2.  4. Eccentric left ventricular hypertrophy (dilated left  ventricle with normal relative wall thickness).  5. Severe global left ventricularsystolic dysfunction.  Endocardial visualization enhanced with intravenous  injection of Ultrasonic Enhancing Agent (Definity). No LV  thrombus.  Septal flattening consistent with right  ventricular overload.  6. Severe diastolic dysfunction with elevated filling  pressures  7. Severe right atrial enlargement.  8. Right ventricular enlargement with decreased right  ventricular systolic function.  A device wire is noted in  the right heart.  9. Dilated tricuspid annulus with malcoaptation of  tricuspid leaflets. Severe tricuspid regurgitation.  *** No previous Echo exam.    < end of copied text >    [ ]  Catheterization:  [ ] Stress Test:    	         ASSESSMENT/PLAN:  62 yo F with history of severe NICM s/p ICD on home milrinone, AF on ac with eliquis, HTN, DM, thyroid CA s/p thyroidectomy who is being seen for heart failure and cardiomyopathy.    cont to hold ASA / Eliquis at present  , restart once cleared by SX   Neurosx following for SDH   ABX per ICU   continue with inotropic support with milrinone      Augusto Grimes MD, [facility]  BEEPER [phone]

## 2019-03-23 NOTE — PROGRESS NOTE ADULT - ASSESSMENT
62 yo F h/o severe cardiomyopathy c/b CHF w EF 10% s/p AICD on home milrinone infusion via PICC, afib on aspirin & elliquis, DM2, HTN, thyroid cancer s/p thyroidectomy presents for mechanical fall, found to have SDH, admitted to MICU for multifactorial shock likely cardiogenic and septic shock i/s/o possible PNA.    Mechanical Fall with SDH   - SDH stable on repeat CT head.   - frequent neuro checks  - appreciate neurosurgery recs    Severe cardiomyopathy c/b CHF s/p AICD on home milrinone infusion via PICC with Shock ; Multifactorial shock w cardiogenic & septic shock i/s/o PNA. Prolonged QTc 602 ms. Troponin 37.  - amiodarone 150 mg stat  - c/w norepinephrine gtt, titrate to maintain MAP > 65  - c/w milrinone gtt  - TTE    Sepsis  with Bacteremia K Pneumonia  : -ID helping.   - IV Rocephin     Hypocalcemia ,Hypokalemia & hypomagnesemia. Cr stable. Renal helping.   - monitor BMP, replete for K > 4, Mg > 2    Congestive hepatopathy : Sec to CHF .Abd mildly distended, non-tender. Cholelithiasis on prior US. Lipase 140. Ammonia wnl.   - Stable.     DM2  - ISS q6h  Thyroid cancer s/p thyroidectomy  - on synthroid     - DVT ppx: SCDs, hold pharmacological ppx  - tx SDH as above

## 2019-03-23 NOTE — PROGRESS NOTE ADULT - ATTENDING COMMENTS
EP ATTENDING    Agree with above. F/U neurosurgery regarding if/when anticoagulation can be restarted for Afib.

## 2019-03-23 NOTE — PROGRESS NOTE ADULT - SUBJECTIVE AND OBJECTIVE BOX
INTERVAL HPI/OVERNIGHT EVENTS: I feel better . I want to rest for a while from Milrinone drip.   Vital Signs Last 24 Hrs  T(C): 36.5 (23 Mar 2019 13:30), Max: 37.4 (23 Mar 2019 00:17)  T(F): 97.7 (23 Mar 2019 13:30), Max: 99.3 (23 Mar 2019 00:17)  HR: 110 (23 Mar 2019 13:30) (101 - 118)  BP: 96/66 (23 Mar 2019 13:30) (96/66 - 118/76)  BP(mean): 78 (22 Mar 2019 17:00) (78 - 80)  RR: 16 (23 Mar 2019 13:30) (16 - 40)  SpO2: 99% (23 Mar 2019 13:30) (98% - 100%)  I&O's Summary    22 Mar 2019 07:01  -  23 Mar 2019 07:00  --------------------------------------------------------  IN: 1608 mL / OUT: 1140 mL / NET: 468 mL    23 Mar 2019 07:01  -  23 Mar 2019 14:53  --------------------------------------------------------  IN: 560 mL / OUT: 250 mL / NET: 310 mL      MEDICATIONS  (STANDING):  buMETAnide 1 milliGRAM(s) Oral daily  cefTRIAXone   IVPB      cefTRIAXone   IVPB 2 Gram(s) IV Intermittent every 24 hours  chlorhexidine 4% Liquid 1 Application(s) Topical <User Schedule>  dextrose 5%. 1000 milliLiter(s) (50 mL/Hr) IV Continuous <Continuous>  dextrose 50% Injectable 12.5 Gram(s) IV Push once  dextrose 50% Injectable 25 Gram(s) IV Push once  dextrose 50% Injectable 25 Gram(s) IV Push once  gabapentin 100 milliGRAM(s) Oral every 12 hours  insulin lispro (HumaLOG) corrective regimen sliding scale   SubCutaneous three times a day before meals  insulin lispro (HumaLOG) corrective regimen sliding scale   SubCutaneous at bedtime  milrinone Infusion 0.5 MICROgram(s)/kG/Min (11.985 mL/Hr) IV Continuous <Continuous>  rifaximin 550 milliGRAM(s) Oral two times a day    MEDICATIONS  (PRN):  benzocaine 15 mG/menthol 3.6 mG Lozenge 1 Lozenge Oral every 4 hours PRN Sore Throat  dextrose 40% Gel 15 Gram(s) Oral once PRN Blood Glucose LESS THAN 70 milliGRAM(s)/deciliter  glucagon  Injectable 1 milliGRAM(s) IntraMuscular once PRN Glucose LESS THAN 70 milligrams/deciliter    LABS:                        9.1    10.12 )-----------( 233      ( 23 Mar 2019 09:49 )             27.8     03-23    130<L>  |  91<L>  |  14  ----------------------------<  126<H>  3.8   |  24  |  0.85    Ca    6.7<L>      23 Mar 2019 06:50  Phos  4.3     03-23  Mg     1.5     03-23    TPro  6.7  /  Alb  2.6<L>  /  TBili  3.8<H>  /  DBili  x   /  AST  41<H>  /  ALT  25  /  AlkPhos  356<H>  03-23    PT/INR - ( 22 Mar 2019 12:20 )   PT: 22.8 sec;   INR: 1.94 ratio         PTT - ( 22 Mar 2019 16:28 )  PTT:29.8 sec    CAPILLARY BLOOD GLUCOSE      POCT Blood Glucose.: 173 mg/dL (23 Mar 2019 11:28)  POCT Blood Glucose.: 122 mg/dL (23 Mar 2019 07:29)  POCT Blood Glucose.: 142 mg/dL (22 Mar 2019 21:19)  POCT Blood Glucose.: 304 mg/dL (22 Mar 2019 16:49)          REVIEW OF SYSTEMS:  CONSTITUTIONAL: No fever, weight loss, or fatigue  EYES: No eye pain, visual disturbances, or discharge  ENMT:  No difficulty hearing, tinnitus, vertigo; No sinus or throat pain  NECK: No pain or stiffness  BREASTS: No pain, masses, or nipple discharge  RESPIRATORY: No cough, wheezing, chills or hemoptysis; No shortness of breath  CARDIOVASCULAR: No chest pain, palpitations, dizziness, or leg swelling  GASTROINTESTINAL: No abdominal or epigastric pain. No nausea, vomiting, or hematemesis; No diarrhea or constipation. No melena or hematochezia.  GENITOURINARY: No dysuria, frequency, hematuria, or incontinence  NEUROLOGICAL: No headaches, memory loss, loss of strength, numbness, or tremors  SKIN: No itching, burning, rashes, or lesions   LYMPH NODES: No enlarged glands  ENDOCRINE: No heat or cold intolerance; No hair loss  MUSCULOSKELETAL: No joint pain or swelling; No muscle, back, or extremity pain  PSYCHIATRIC: No depression, anxiety, mood swings, or difficulty sleeping  HEME/LYMPH: No easy bruising, or bleeding gums  ALLERY AND IMMUNOLOGIC: No hives or eczema    RADIOLOGY & ADDITIONAL TESTS:    Consultant(s) Notes Reviewed:  [x ] YES  [ ] NO    PHYSICAL EXAM:  GENERAL: NAD, well-groomed, well-developed,not in any distress ,  HEAD:  Atraumatic, Normocephalic  EYES: EOMI, PERRLA, conjunctiva and sclera clear  ENMT: No tonsillar erythema, exudates, or enlargement; Moist mucous membranes, Good dentition, No lesions  NECK: Supple, No JVD, Normal thyroid  NERVOUS SYSTEM:  Alert & Oriented X3, No focal deficit   CHEST/LUNG: Good air entry bilateral with no  rales, rhonchi, wheezing, or rubs  HEART: Regular rate and rhythm; No murmurs, rubs, or gallops  ABDOMEN: Soft, Nontender, Nondistended; Bowel sounds present  EXTREMITIES:  2+ Peripheral Pulses, No clubbing, cyanosis, or edema  SKIN: No rashes or lesions    Care Discussed with Consultants/Other Providers [ x] YES  [ ] NO INTERVAL HPI/OVERNIGHT EVENTS: I feel better . I want to rest for a while from Milrinone drip.   Vital Signs Last 24 Hrs  T(C): 36.5 (23 Mar 2019 13:30), Max: 37.4 (23 Mar 2019 00:17)  T(F): 97.7 (23 Mar 2019 13:30), Max: 99.3 (23 Mar 2019 00:17)  HR: 110 (23 Mar 2019 13:30) (101 - 118)  BP: 96/66 (23 Mar 2019 13:30) (96/66 - 118/76)  BP(mean): 78 (22 Mar 2019 17:00) (78 - 80)  RR: 16 (23 Mar 2019 13:30) (16 - 40)  SpO2: 99% (23 Mar 2019 13:30) (98% - 100%)  I&O's Summary    22 Mar 2019 07:01  -  23 Mar 2019 07:00  --------------------------------------------------------  IN: 1608 mL / OUT: 1140 mL / NET: 468 mL    23 Mar 2019 07:01  -  23 Mar 2019 14:53  --------------------------------------------------------  IN: 560 mL / OUT: 250 mL / NET: 310 mL      MEDICATIONS  (STANDING):  buMETAnide 1 milliGRAM(s) Oral daily  cefTRIAXone   IVPB      cefTRIAXone   IVPB 2 Gram(s) IV Intermittent every 24 hours  chlorhexidine 4% Liquid 1 Application(s) Topical <User Schedule>  dextrose 5%. 1000 milliLiter(s) (50 mL/Hr) IV Continuous <Continuous>  dextrose 50% Injectable 12.5 Gram(s) IV Push once  dextrose 50% Injectable 25 Gram(s) IV Push once  dextrose 50% Injectable 25 Gram(s) IV Push once  gabapentin 100 milliGRAM(s) Oral every 12 hours  insulin lispro (HumaLOG) corrective regimen sliding scale   SubCutaneous three times a day before meals  insulin lispro (HumaLOG) corrective regimen sliding scale   SubCutaneous at bedtime  milrinone Infusion 0.5 MICROgram(s)/kG/Min (11.985 mL/Hr) IV Continuous <Continuous>  rifaximin 550 milliGRAM(s) Oral two times a day    MEDICATIONS  (PRN):  benzocaine 15 mG/menthol 3.6 mG Lozenge 1 Lozenge Oral every 4 hours PRN Sore Throat  dextrose 40% Gel 15 Gram(s) Oral once PRN Blood Glucose LESS THAN 70 milliGRAM(s)/deciliter  glucagon  Injectable 1 milliGRAM(s) IntraMuscular once PRN Glucose LESS THAN 70 milligrams/deciliter    LABS:                        9.1    10.12 )-----------( 233      ( 23 Mar 2019 09:49 )             27.8     03-23    130<L>  |  91<L>  |  14  ----------------------------<  126<H>  3.8   |  24  |  0.85    Ca    6.7<L>      23 Mar 2019 06:50  Phos  4.3     03-23  Mg     1.5     03-23    TPro  6.7  /  Alb  2.6<L>  /  TBili  3.8<H>  /  DBili  x   /  AST  41<H>  /  ALT  25  /  AlkPhos  356<H>  03-23    PT/INR - ( 22 Mar 2019 12:20 )   PT: 22.8 sec;   INR: 1.94 ratio         PTT - ( 22 Mar 2019 16:28 )  PTT:29.8 sec    CAPILLARY BLOOD GLUCOSE      POCT Blood Glucose.: 173 mg/dL (23 Mar 2019 11:28)  POCT Blood Glucose.: 122 mg/dL (23 Mar 2019 07:29)  POCT Blood Glucose.: 142 mg/dL (22 Mar 2019 21:19)  POCT Blood Glucose.: 304 mg/dL (22 Mar 2019 16:49)          REVIEW OF SYSTEMS:  CONSTITUTIONAL: No fever, weight loss, or fatigue  EYES: No eye pain, visual disturbances, or discharge  ENMT:  No difficulty hearing, tinnitus, vertigo; No sinus or throat pain  NECK: No pain or stiffness  RESPIRATORY: No cough, wheezing, chills or hemoptysis; No shortness of breath  CARDIOVASCULAR: No chest pain, palpitations, dizziness, or leg swelling  GASTROINTESTINAL: No abdominal or epigastric pain. No nausea, vomiting, or hematemesis; No diarrhea or constipation. No melena or hematochezia.  GENITOURINARY: No dysuria, frequency, hematuria, or incontinence  NEUROLOGICAL: No headaches, memory loss, loss of strength, numbness, or tremors    Consultant(s) Notes Reviewed:  [x ] YES  [ ] NO    PHYSICAL EXAM:  GENERAL: NAD, well-groomed, well-developed,not in any distress ,  HEAD:  Atraumatic, Normocephalic  NECK: JVD+++  NERVOUS SYSTEM:  Alert & Oriented X3, No focal deficit   CHEST/LUNG: Good air entry bilateral with no  rales, rhonchi, wheezing, or rubs  HEART: Regular rate and rhythm; No murmurs, rubs, or gallops  ABDOMEN: Soft, Nontender, Nondistended; Bowel sounds present  EXTREMITIES:  2+ Peripheral Pulses, No clubbing, cyanosis, or edema    Care Discussed with Consultants/Other Providers [ x] YES  [ ] NO

## 2019-03-23 NOTE — PROGRESS NOTE ADULT - SUBJECTIVE AND OBJECTIVE BOX
EP Nurse Practitioner note     tele: sinus tachycardia, biv-pacing    benzocaine 15 mG/menthol 3.6 mG Lozenge 1 Lozenge Oral every 4 hours PRN  buMETAnide 1 milliGRAM(s) Oral daily  cefTRIAXone   IVPB      cefTRIAXone   IVPB 2 Gram(s) IV Intermittent every 24 hours  chlorhexidine 4% Liquid 1 Application(s) Topical <User Schedule>  dextrose 40% Gel 15 Gram(s) Oral once PRN  dextrose 5%. 1000 milliLiter(s) IV Continuous <Continuous>  dextrose 50% Injectable 12.5 Gram(s) IV Push once  dextrose 50% Injectable 25 Gram(s) IV Push once  dextrose 50% Injectable 25 Gram(s) IV Push once  gabapentin 100 milliGRAM(s) Oral every 12 hours  glucagon  Injectable 1 milliGRAM(s) IntraMuscular once PRN  insulin lispro (HumaLOG) corrective regimen sliding scale   SubCutaneous three times a day before meals  insulin lispro (HumaLOG) corrective regimen sliding scale   SubCutaneous at bedtime  milrinone Infusion 0.5 MICROgram(s)/kG/Min IV Continuous <Continuous>  norepinephrine Infusion 0.05 MICROgram(s)/kG/Min IV Continuous <Continuous>  phytonadione  IVPB 10 milliGRAM(s) IV Intermittent daily  rifaximin 550 milliGRAM(s) Oral two times a day                            9.7    19.5  )-----------( 238      ( 22 Mar 2019 00:39 )             31.5       Hemoglobin: 9.7 g/dL (03-22 @ 00:39)  Hemoglobin: 10.3 g/dL (03-21 @ 17:33)  Hemoglobin: 9.9 g/dL (03-21 @ 04:27)  Hemoglobin: 9.9 g/dL (03-20 @ 17:51)  Hemoglobin: 10.3 g/dL (03-20 @ 16:09)      03-23    130<L>  |  91<L>  |  14  ----------------------------<  126<H>  3.8   |  24  |  0.85    Ca    6.7<L>      23 Mar 2019 06:50  Phos  4.3     03-23  Mg     1.5     03-23    TPro  6.7  /  Alb  2.6<L>  /  TBili  3.8<H>  /  DBili  x   /  AST  41<H>  /  ALT  25  /  AlkPhos  356<H>  03-23    Creatinine Trend: 0.85<--, 0.82<--, 0.90<--, 1.00<--, 1.02<--, 1.11<--    COAGS: PT/INR - ( 22 Mar 2019 12:20 )   PT: 22.8 sec;   INR: 1.94 ratio         PTT - ( 22 Mar 2019 16:28 )  PTT:29.8 sec    CARDIAC MARKERS ( 20 Mar 2019 08:41 )  x     / x     / 90 U/L / x     / 2.4 ng/mL        T(C): 36.6 (03-23-19 @ 05:00), Max: 37.4 (03-23-19 @ 00:17)  HR: 110 (03-23-19 @ 05:00) (101 - 118)  BP: 104/72 (03-23-19 @ 05:00) (86/53 - 114/72)  RR: 18 (03-23-19 @ 05:00) (13 - 40)  SpO2: 100% (03-23-19 @ 05:00) (93% - 100%)  Wt(kg): --    I&O's Summary    22 Mar 2019 07:01  -  23 Mar 2019 07:00  --------------------------------------------------------  IN: 1608 mL / OUT: 1140 mL / NET: 468 mL        JVP 8  tachy, no murmurs  CTAB  soft nt/nd  no c/c/e    3/20/19 device interrogation: unremarkable      A/P) She is a pleasant 64 y/o female PMH paroxysmal atrial fibrillation and a severe NICM (LVEF 10-15%) who had a [employer] Biv-ICD implanted at [facility]. A LHC at [facility] was also unremarkable. She now returns with a mechanical fall resulting in a subdural hematoma. A/C on hold as per neurosurgery. EP is now called for further recommendations and consideration for Watchman if she can't tolerate a/c long term. Review of her EKGs and tele show the rhythm is sinus with adequate BiV-tracking (RBBB in V1, negative in I/L). She hasn't had any more significant NSVT despite milrinone. There were no arrhythmic events on her device interrogation to explain her fall.    -medical therapy for CHF as per cardiology  -A/C and ASA per neurosx, ,  for PAF with elevated chads-vasc    -if she can't tolerate a/c long term then next step would be outpatient left atrial appendage occlusion with Watchman, but this requires at least 45 days of full anticoagulation, or at least DAPT as per ASAP-TOO trial.   -supportive care as per surgery   -will follow  -no specific EP intervention indicated at this time  D/W Dr Hallman

## 2019-03-24 LAB
ANION GAP SERPL CALC-SCNC: 17 MMOL/L — SIGNIFICANT CHANGE UP (ref 5–17)
BUN SERPL-MCNC: 12 MG/DL — SIGNIFICANT CHANGE UP (ref 7–23)
CALCIUM SERPL-MCNC: 6.6 MG/DL — LOW (ref 8.4–10.5)
CHLORIDE SERPL-SCNC: 89 MMOL/L — LOW (ref 96–108)
CO2 SERPL-SCNC: 25 MMOL/L — SIGNIFICANT CHANGE UP (ref 22–31)
CREAT SERPL-MCNC: 0.76 MG/DL — SIGNIFICANT CHANGE UP (ref 0.5–1.3)
GLUCOSE BLDC GLUCOMTR-MCNC: 145 MG/DL — HIGH (ref 70–99)
GLUCOSE BLDC GLUCOMTR-MCNC: 186 MG/DL — HIGH (ref 70–99)
GLUCOSE BLDC GLUCOMTR-MCNC: 207 MG/DL — HIGH (ref 70–99)
GLUCOSE BLDC GLUCOMTR-MCNC: 249 MG/DL — HIGH (ref 70–99)
GLUCOSE SERPL-MCNC: 185 MG/DL — HIGH (ref 70–99)
HCT VFR BLD CALC: 27.1 % — LOW (ref 34.5–45)
HGB BLD-MCNC: 8.8 G/DL — LOW (ref 11.5–15.5)
MAGNESIUM SERPL-MCNC: 1.4 MG/DL — LOW (ref 1.6–2.6)
MCHC RBC-ENTMCNC: 23.3 PG — LOW (ref 27–34)
MCHC RBC-ENTMCNC: 32.5 GM/DL — SIGNIFICANT CHANGE UP (ref 32–36)
MCV RBC AUTO: 71.7 FL — LOW (ref 80–100)
PLATELET # BLD AUTO: 223 K/UL — SIGNIFICANT CHANGE UP (ref 150–400)
POTASSIUM SERPL-MCNC: 3.4 MMOL/L — LOW (ref 3.5–5.3)
POTASSIUM SERPL-SCNC: 3.4 MMOL/L — LOW (ref 3.5–5.3)
RBC # BLD: 3.78 M/UL — LOW (ref 3.8–5.2)
RBC # FLD: 24.8 % — HIGH (ref 10.3–14.5)
SODIUM SERPL-SCNC: 131 MMOL/L — LOW (ref 135–145)
WBC # BLD: 7.25 K/UL — SIGNIFICANT CHANGE UP (ref 3.8–10.5)
WBC # FLD AUTO: 7.25 K/UL — SIGNIFICANT CHANGE UP (ref 3.8–10.5)

## 2019-03-24 RX ORDER — MAGNESIUM SULFATE 500 MG/ML
1 VIAL (ML) INJECTION ONCE
Qty: 0 | Refills: 0 | Status: COMPLETED | OUTPATIENT
Start: 2019-03-24 | End: 2019-03-24

## 2019-03-24 RX ORDER — POTASSIUM CHLORIDE 20 MEQ
40 PACKET (EA) ORAL EVERY 4 HOURS
Qty: 0 | Refills: 0 | Status: COMPLETED | OUTPATIENT
Start: 2019-03-24 | End: 2019-03-24

## 2019-03-24 RX ADMIN — Medication 100 GRAM(S): at 18:28

## 2019-03-24 RX ADMIN — CEFTRIAXONE 100 GRAM(S): 500 INJECTION, POWDER, FOR SOLUTION INTRAMUSCULAR; INTRAVENOUS at 11:54

## 2019-03-24 RX ADMIN — Medication 2: at 17:22

## 2019-03-24 RX ADMIN — BUMETANIDE 2 MILLIGRAM(S): 0.25 INJECTION INTRAMUSCULAR; INTRAVENOUS at 05:42

## 2019-03-24 RX ADMIN — BENZOCAINE AND MENTHOL 1 LOZENGE: 5; 1 LIQUID ORAL at 17:23

## 2019-03-24 RX ADMIN — GABAPENTIN 100 MILLIGRAM(S): 400 CAPSULE ORAL at 17:23

## 2019-03-24 RX ADMIN — MILRINONE LACTATE 11.98 MICROGRAM(S)/KG/MIN: 1 INJECTION, SOLUTION INTRAVENOUS at 11:51

## 2019-03-24 RX ADMIN — Medication 4: at 11:51

## 2019-03-24 RX ADMIN — CHLORHEXIDINE GLUCONATE 1 APPLICATION(S): 213 SOLUTION TOPICAL at 06:07

## 2019-03-24 RX ADMIN — GABAPENTIN 100 MILLIGRAM(S): 400 CAPSULE ORAL at 05:42

## 2019-03-24 RX ADMIN — Medication 40 MILLIEQUIVALENT(S): at 11:50

## 2019-03-24 NOTE — CHART NOTE - NSCHARTNOTEFT_GEN_A_CORE
Called by RN that pt. with 4 beats wct  Pt. potassium and mag replete   No further events on telemetry   Tiffanie Barron FNP-BC

## 2019-03-24 NOTE — PROGRESS NOTE ADULT - SUBJECTIVE AND OBJECTIVE BOX
INTERVAL HPI/OVERNIGHT EVENTS: i feel better .   Vital Signs Last 24 Hrs  T(C): 36.3 (24 Mar 2019 13:17), Max: 36.7 (23 Mar 2019 20:32)  T(F): 97.4 (24 Mar 2019 13:17), Max: 98.1 (23 Mar 2019 20:32)  HR: 105 (24 Mar 2019 13:17) (105 - 113)  BP: 107/75 (24 Mar 2019 13:17) (106/74 - 108/78)  BP(mean): --  RR: 17 (24 Mar 2019 13:17) (16 - 18)  SpO2: 96% (24 Mar 2019 13:17) (96% - 100%)  I&O's Summary    23 Mar 2019 07:01  -  24 Mar 2019 07:00  --------------------------------------------------------  IN: 1316 mL / OUT: 1950 mL / NET: -634 mL    24 Mar 2019 07:01  -  24 Mar 2019 18:14  --------------------------------------------------------  IN: 360 mL / OUT: 1500 mL / NET: -1140 mL      MEDICATIONS  (STANDING):  buMETAnide 2 milliGRAM(s) Oral daily  cefTRIAXone   IVPB      cefTRIAXone   IVPB 2 Gram(s) IV Intermittent every 24 hours  chlorhexidine 4% Liquid 1 Application(s) Topical <User Schedule>  dextrose 5%. 1000 milliLiter(s) (50 mL/Hr) IV Continuous <Continuous>  dextrose 50% Injectable 12.5 Gram(s) IV Push once  dextrose 50% Injectable 25 Gram(s) IV Push once  dextrose 50% Injectable 25 Gram(s) IV Push once  gabapentin 100 milliGRAM(s) Oral every 12 hours  insulin lispro (HumaLOG) corrective regimen sliding scale   SubCutaneous three times a day before meals  insulin lispro (HumaLOG) corrective regimen sliding scale   SubCutaneous at bedtime  magnesium sulfate  IVPB 1 Gram(s) IV Intermittent once  milrinone Infusion 0.5 MICROgram(s)/kG/Min (11.985 mL/Hr) IV Continuous <Continuous>  rifaximin 550 milliGRAM(s) Oral two times a day    MEDICATIONS  (PRN):  benzocaine 15 mG/menthol 3.6 mG Lozenge 1 Lozenge Oral every 4 hours PRN Sore Throat  dextrose 40% Gel 15 Gram(s) Oral once PRN Blood Glucose LESS THAN 70 milliGRAM(s)/deciliter  glucagon  Injectable 1 milliGRAM(s) IntraMuscular once PRN Glucose LESS THAN 70 milligrams/deciliter    LABS:                        8.8    7.25  )-----------( 223      ( 24 Mar 2019 08:58 )             27.1     03-24    131<L>  |  89<L>  |  12  ----------------------------<  185<H>  3.4<L>   |  25  |  0.76    Ca    6.6<L>      24 Mar 2019 05:52  Phos  4.3     03-23  Mg     1.4     03-24    TPro  7.0  /  Alb  2.8<L>  /  TBili  3.7<H>  /  DBili  x   /  AST  43<H>  /  ALT  29  /  AlkPhos  374<H>  03-23        CAPILLARY BLOOD GLUCOSE      POCT Blood Glucose.: 186 mg/dL (24 Mar 2019 17:04)  POCT Blood Glucose.: 207 mg/dL (24 Mar 2019 11:28)  POCT Blood Glucose.: 145 mg/dL (24 Mar 2019 07:27)  POCT Blood Glucose.: 197 mg/dL (23 Mar 2019 21:40)          REVIEW OF SYSTEMS:  CONSTITUTIONAL: No fever, weight loss, or fatigue  EYES: No eye pain, visual disturbances, or discharge  ENMT:  No difficulty hearing, tinnitus, vertigo; No sinus or throat pain  NECK: No pain or stiffness  RESPIRATORY: No cough, wheezing, chills or hemoptysis; No shortness of breath  CARDIOVASCULAR: No chest pain, palpitations, dizziness, or leg swelling  GASTROINTESTINAL: No abdominal or epigastric pain. No nausea, vomiting, or hematemesis; No diarrhea or constipation. No melena or hematochezia.  GENITOURINARY: No dysuria, frequency, hematuria, or incontinence  NEUROLOGICAL: No headaches, memory loss, loss of strength, numbness, or tremors      Consultant(s) Notes Reviewed:  [x ] YES  [ ] NO    PHYSICAL EXAM:  GENERAL: NAD, well-groomed, well-developed, not in any distress ,  HEAD:  Atraumatic, Normocephalic  NECK: JVD+ .Rt IJ catheter  NERVOUS SYSTEM:  Alert & Oriented X3, No focal deficit   CHEST/LUNG: Good air entry bilateral with no  rales, rhonchi, wheezing, or rubs  HEART: Regular rate and rhythm; No murmurs, rubs, or gallops  ABDOMEN: Soft, Nontender, Nondistended; Bowel sounds present  EXTREMITIES:  2+ Peripheral Pulses, No clubbing, cyanosis, or edema    Care Discussed with Consultants/Other Providers [ x] YES  [ ] NO

## 2019-03-24 NOTE — PROGRESS NOTE ADULT - SUBJECTIVE AND OBJECTIVE BOX
EP Nurse Practitioner note     tele: sinus tachycardia, biv-pacing    benzocaine 15 mG/menthol 3.6 mG Lozenge 1 Lozenge Oral every 4 hours PRN  buMETAnide 2 milliGRAM(s) Oral daily  cefTRIAXone   IVPB      cefTRIAXone   IVPB 2 Gram(s) IV Intermittent every 24 hours  chlorhexidine 4% Liquid 1 Application(s) Topical <User Schedule>  dextrose 40% Gel 15 Gram(s) Oral once PRN  dextrose 5%. 1000 milliLiter(s) IV Continuous <Continuous>  dextrose 50% Injectable 12.5 Gram(s) IV Push once  dextrose 50% Injectable 25 Gram(s) IV Push once  dextrose 50% Injectable 25 Gram(s) IV Push once  gabapentin 100 milliGRAM(s) Oral every 12 hours  glucagon  Injectable 1 milliGRAM(s) IntraMuscular once PRN  insulin lispro (HumaLOG) corrective regimen sliding scale   SubCutaneous three times a day before meals  insulin lispro (HumaLOG) corrective regimen sliding scale   SubCutaneous at bedtime  milrinone Infusion 0.5 MICROgram(s)/kG/Min IV Continuous <Continuous>  rifaximin 550 milliGRAM(s) Oral two times a day                            9.4    8.47  )-----------( 239      ( 23 Mar 2019 19:28 )             29.4       Hemoglobin: 9.4 g/dL (03-23 @ 19:28)  Hemoglobin: 9.1 g/dL (03-23 @ 09:49)  Hemoglobin: 9.7 g/dL (03-22 @ 00:39)  Hemoglobin: 10.3 g/dL (03-21 @ 17:33)  Hemoglobin: 9.9 g/dL (03-21 @ 04:27)      03-24    131<L>  |  89<L>  |  12  ----------------------------<  185<H>  3.4<L>   |  25  |  0.76    Ca    6.6<L>      24 Mar 2019 05:52  Phos  4.3     03-23  Mg     1.5     03-23    TPro  7.0  /  Alb  2.8<L>  /  TBili  3.7<H>  /  DBili  x   /  AST  43<H>  /  ALT  29  /  AlkPhos  374<H>  03-23    Creatinine Trend: 0.76<--, 0.74<--, 0.85<--, 0.82<--, 0.90<--, 1.00<--    COAGS:           T(C): 36.7 (03-24-19 @ 04:50), Max: 36.8 (03-23-19 @ 10:30)  HR: 109 (03-24-19 @ 04:50) (109 - 118)  BP: 106/74 (03-24-19 @ 04:50) (95/68 - 118/76)  RR: 18 (03-24-19 @ 04:50) (16 - 20)  SpO2: 98% (03-24-19 @ 04:50) (98% - 100%)  Wt(kg): --    I&O's Summary    23 Mar 2019 07:01  -  24 Mar 2019 07:00  --------------------------------------------------------  IN: 1316 mL / OUT: 1950 mL / NET: -634 mL        JVP 8  tachy, no murmurs  CTAB  soft nt/nd  no c/c/e    3/20/19 device interrogation: unremarkable      A/P) She is a pleasant 64 y/o female PMH paroxysmal atrial fibrillation and a severe NICM (LVEF 10-15%) who had a [employer] Biv-ICD implanted at [facility]. A LHC at [facility] was also unremarkable. She now returns with a mechanical fall resulting in a subdural hematoma. A/C on hold as per neurosurgery. EP is now called for further recommendations and consideration for Watchman if she can't tolerate a/c long term. Review of her EKGs and tele show the rhythm is sinus with adequate BiV-tracking (RBBB in V1, negative in I/L). She hasn't had any more significant NSVT despite milrinone. There were no arrhythmic events on her device interrogation to explain her fall.    - cont Abx per medicine   -medical therapy for CHF as per cardiology  -A/C and ASA per neurosx, ,  for PAF with elevated chads-vasc    -if she can't tolerate a/c long term then next step would be outpatient left atrial appendage occlusion with Watchman, but this requires at least 45 days of full anticoagulation, or at least DAPT as per ASAP-TOO trial.   -supportive care as per surgery   -no specific EP intervention indicated at this time  D/W Dr Hallman

## 2019-03-24 NOTE — PROGRESS NOTE ADULT - ASSESSMENT
64 yo F h/o severe cardiomyopathy c/b CHF w EF 10% s/p AICD on home milrinone infusion via PICC, afib on aspirin & elliquis, DM2, HTN, thyroid cancer s/p thyroidectomy presents for mechanical fall, found to have SDH, admitted to MICU for multifactorial shock likely cardiogenic and septic shock i/s/o possible PNA.    Sepsis  with Bacteremia K Pneumonia  : -ID helping.   - IV Rocephin     Mechanical Fall with SDH   - SDH stable on repeat CT head.   - frequent neuro checks  - appreciate neurosurgery recs    Severe cardiomyopathy c/b CHF s/p AICD on home milrinone infusion via PICC with Shock ; Multifactorial shock w cardiogenic & septic shock i/s/o PNA. Management per  Cardiology, HF Team and EP.   - amiodarone 150 mg stat  - c/w milrinone gtt  - TTE    Anemia   : -Chronic.  - Daily CBC    Hypocalcemia ,Hypokalemia & hypomagnesemia. Cr stable. Renal helping.   - monitor BMP, replete for K > 4, Mg > 2    Congestive hepatopathy : Sec to CHF .Abd mildly distended, non-tender. Cholelithiasis on prior US. Lipase 140. Ammonia wnl.   - Stable.     DM2; Sugars in good range.   - ISS q6h    Thyroid cancer s/p thyroidectomy  - on synthroid     - DVT ppx: SCDs, hold pharmacological ppx  - tx SDH as above

## 2019-03-24 NOTE — PROGRESS NOTE ADULT - ATTENDING COMMENTS
EP ATTENDING    Agree with above. No further inpatient EP workup expected. F/U neurosurgery regarding a/c for afib

## 2019-03-24 NOTE — PROGRESS NOTE ADULT - SUBJECTIVE AND OBJECTIVE BOX
NEPHROLOGY-NSN        Patient seen and examined in bed. Awake, alert. She denies sob, chest pain. On Milrinone infusion     REVIEW OF SYSTEMS:  As per HPI, otherwise 8 full 10 ROS were unremarkable    MEDICATIONS  (STANDING):  buMETAnide 2 milliGRAM(s) Oral daily  cefTRIAXone   IVPB      cefTRIAXone   IVPB 2 Gram(s) IV Intermittent every 24 hours  chlorhexidine 4% Liquid 1 Application(s) Topical <User Schedule>  dextrose 5%. 1000 milliLiter(s) (50 mL/Hr) IV Continuous <Continuous>  dextrose 50% Injectable 12.5 Gram(s) IV Push once  dextrose 50% Injectable 25 Gram(s) IV Push once  dextrose 50% Injectable 25 Gram(s) IV Push once  gabapentin 100 milliGRAM(s) Oral every 12 hours  insulin lispro (HumaLOG) corrective regimen sliding scale   SubCutaneous three times a day before meals  insulin lispro (HumaLOG) corrective regimen sliding scale   SubCutaneous at bedtime  milrinone Infusion 0.5 MICROgram(s)/kG/Min (11.985 mL/Hr) IV Continuous <Continuous>  rifaximin 550 milliGRAM(s) Oral two times a day      VITAL:  T(C): , Max: 36.7 (03-23-19 @ 20:32)  T(F): , Max: 98.1 (03-23-19 @ 20:32)  HR: 109 (03-24-19 @ 04:50)  BP: 106/74 (03-24-19 @ 04:50)  BP(mean): --  RR: 18 (03-24-19 @ 04:50)  SpO2: 98% (03-24-19 @ 04:50)  Wt(kg): --    I and O's:    03-23 @ 07:01  -  03-24 @ 07:00  --------------------------------------------------------  IN: 1316 mL / OUT: 1950 mL / NET: -634 mL    urine output 1950/24 hours      PHYSICAL EXAM:    Constitutional: NAD                                Neck:  No JVD  Respiratory: CTAB/L  Cardiovascular: S1 and S2  Gastrointestinal: BS+, soft, NT/ND  Extremities: + edema; LLE dressing  Neurological: A/O x 3  Psychiatric: Normal mood, normal affect  : No Gustafson  Skin: No rashes  Access: Not applicable    LABS:                        8.8    7.25  )-----------( 223      ( 24 Mar 2019 08:58 )             27.1     03-24    131<L>  |  89<L>  |  12  ----------------------------<  185<H>  3.4<L>   |  25  |  0.76    Ca    6.6<L>      24 Mar 2019 05:52  Phos  4.3     03-23  Mg     1.5     03-23    TPro  7.0  /  Alb  2.8<L>  /  TBili  3.7<H>  /  DBili  x   /  AST  43<H>  /  ALT  29  /  AlkPhos  374<H>  03-23          Urine Studies:    Creatinine, Random Urine: 87 mg/dL (03-23 @ 14:17)  Osmolality, Random Urine: 276 mos/kg (03-23 @ 14:17)  Sodium, Random Urine: 22 mmol/L (03-23 @ 14:17)        RADIOLOGY & ADDITIONAL STUDIES:

## 2019-03-24 NOTE — PROGRESS NOTE ADULT - SUBJECTIVE AND OBJECTIVE BOX
Subjective: pt seen and examined, no complaints, ROS - .   Tolerating Primacor gtt     benzocaine 15 mG/menthol 3.6 mG Lozenge 1 Lozenge Oral every 4 hours PRN  buMETAnide 2 milliGRAM(s) Oral daily  cefTRIAXone   IVPB      cefTRIAXone   IVPB 2 Gram(s) IV Intermittent every 24 hours  chlorhexidine 4% Liquid 1 Application(s) Topical <User Schedule>  dextrose 40% Gel 15 Gram(s) Oral once PRN  dextrose 5%. 1000 milliLiter(s) IV Continuous <Continuous>  dextrose 50% Injectable 12.5 Gram(s) IV Push once  dextrose 50% Injectable 25 Gram(s) IV Push once  dextrose 50% Injectable 25 Gram(s) IV Push once  gabapentin 100 milliGRAM(s) Oral every 12 hours  glucagon  Injectable 1 milliGRAM(s) IntraMuscular once PRN  insulin lispro (HumaLOG) corrective regimen sliding scale   SubCutaneous three times a day before meals  insulin lispro (HumaLOG) corrective regimen sliding scale   SubCutaneous at bedtime  milrinone Infusion 0.5 MICROgram(s)/kG/Min IV Continuous <Continuous>  rifaximin 550 milliGRAM(s) Oral two times a day                            8.8    7.25  )-----------( 223      ( 24 Mar 2019 08:58 )             27.1       Hemoglobin: 8.8 g/dL (03-24 @ 08:58)  Hemoglobin: 9.4 g/dL (03-23 @ 19:28)  Hemoglobin: 9.1 g/dL (03-23 @ 09:49)  Hemoglobin: 9.7 g/dL (03-22 @ 00:39)  Hemoglobin: 10.3 g/dL (03-21 @ 17:33)      03-24    131<L>  |  89<L>  |  12  ----------------------------<  185<H>  3.4<L>   |  25  |  0.76    Ca    6.6<L>      24 Mar 2019 05:52  Phos  4.3     03-23  Mg     1.4     03-24    TPro  7.0  /  Alb  2.8<L>  /  TBili  3.7<H>  /  DBili  x   /  AST  43<H>  /  ALT  29  /  AlkPhos  374<H>  03-23    Creatinine Trend: 0.76<--, 0.74<--, 0.85<--, 0.82<--, 0.90<--, 1.00<--    COAGS:           T(C): 36.3 (03-24-19 @ 13:17), Max: 36.7 (03-23-19 @ 20:32)  HR: 105 (03-24-19 @ 13:17) (105 - 113)  BP: 107/75 (03-24-19 @ 13:17) (95/68 - 108/78)  RR: 17 (03-24-19 @ 13:17) (16 - 18)  SpO2: 96% (03-24-19 @ 13:17) (96% - 100%)  Wt(kg): --    I&O's Summary    23 Mar 2019 07:01  -  24 Mar 2019 07:00  --------------------------------------------------------  IN: 1316 mL / OUT: 1950 mL / NET: -634 mL        	  Lymphatic: No lymphadenopathy trace edema in LE bilaterally   Cardiovascular: Normal S1 S2, + JVD, No murmurs , Peripheral pulses palpable 2+ bilaterally  Respiratory: Lungs clear to auscultation  Gastrointestinal:  Soft, Non-tender, + BS	  Skin: No rashes, No ecchymoses, No cyanosis, warm to touch  Psychiatry:  Mood & affect appropriate      TELEMETRY: Afib       ECG: < from: 12 Lead ECG (03.20.19 @ 05:58) >  WIDE QRS TACHYCARDIA  LEFT AXIS DEVIATION  RIGHT BUNDLE BRANCH BLOCK  LEFT VENTRICULAR HYPERTROPHY WITH REPOLARIZATION ABNORMALITY  INFERIOR INFARCT , AGE UNDETERMINED  ABNORMAL ECG    < end of copied text >    	  RADIOLOGY:  OTHER:     DIAGNOSTIC TESTING:  [ ] Echocardiogram: < from: TTE with Doppler (w/Cont) (11.07.18 @ 05:53) >  Conclusions:  1. Tethered mitral valve leaflets with normal opening.  Mitral annular calcification and thickened  mitral leaflet  tips Moderate mitral regurgitation.  2. Calcified trileaflet aortic valve with normal opening.  3. Moderately dilated left atrium.  LA volume index = 43  cc/m2.  4. Eccentric left ventricular hypertrophy (dilated left  ventricle with normal relative wall thickness).  5. Severe global left ventricularsystolic dysfunction.  Endocardial visualization enhanced with intravenous  injection of Ultrasonic Enhancing Agent (Definity). No LV  thrombus.  Septal flattening consistent with right  ventricular overload.  6. Severe diastolic dysfunction with elevated filling  pressures  7. Severe right atrial enlargement.  8. Right ventricular enlargement with decreased right  ventricular systolic function.  A device wire is noted in  the right heart.  9. Dilated tricuspid annulus with malcoaptation of  tricuspid leaflets. Severe tricuspid regurgitation.  *** No previous Echo exam.    < end of copied text >    [ ]  Catheterization:  [ ] Stress Test:    	         ASSESSMENT/PLAN:  62 yo F with history of severe NICM s/p ICD on home milrinone, AF on ac with eliquis, HTN, DM, thyroid CA s/p thyroidectomy who is being seen for heart failure and cardiomyopathy.    cont to hold ASA / Eliquis at present  , restart once cleared by SX   Neurosx following for SDH   ABX per ICU   continue with inotropic support with milrinone  - f/u Bcx      Augusto Grimes MD, [facility]  BEEPER [phone]

## 2019-03-24 NOTE — PROGRESS NOTE ADULT - ASSESSMENT
The patient is a deidre 63-year-old female with the past medical history of hypertension, diabetes, nonishemic cardiomyopathy, presumed parathyroidectomy, history of thyroid cancer, presents with mechanical fall.  The patient at present has hypervolemic hyponatremia.  She also has hypocalcemia, but again an old finding.    Gram neg rich sepsis  Hyponatremia         1.	Renal.  Restart her calcitriol and her Os-Donta.   Agree with KCL supplementation  2.	Cardiology.   Continue with milrinone.  On Bumex 2mg orally daily    3.	Infectious Disease.    Continue with intravenous antibiotics.     4.	Heart failure called--Patient has poor insight into her disease process

## 2019-03-25 LAB
ALBUMIN SERPL ELPH-MCNC: 3.1 G/DL — LOW (ref 3.3–5)
ALP SERPL-CCNC: 382 U/L — HIGH (ref 40–120)
ALT FLD-CCNC: 29 U/L — SIGNIFICANT CHANGE UP (ref 10–45)
ANION GAP SERPL CALC-SCNC: 16 MMOL/L — SIGNIFICANT CHANGE UP (ref 5–17)
ANION GAP SERPL CALC-SCNC: 19 MMOL/L — HIGH (ref 5–17)
AST SERPL-CCNC: 46 U/L — HIGH (ref 10–40)
BASOPHILS # BLD AUTO: 0.13 K/UL — SIGNIFICANT CHANGE UP (ref 0–0.2)
BASOPHILS NFR BLD AUTO: 1.7 % — SIGNIFICANT CHANGE UP (ref 0–2)
BILIRUB SERPL-MCNC: 3.2 MG/DL — HIGH (ref 0.2–1.2)
BUN SERPL-MCNC: 14 MG/DL — SIGNIFICANT CHANGE UP (ref 7–23)
BUN SERPL-MCNC: 16 MG/DL — SIGNIFICANT CHANGE UP (ref 7–23)
CALCIUM SERPL-MCNC: 5.7 MG/DL — CRITICAL LOW (ref 8.4–10.5)
CALCIUM SERPL-MCNC: 5.8 MG/DL — CRITICAL LOW (ref 8.4–10.5)
CHLORIDE SERPL-SCNC: 85 MMOL/L — LOW (ref 96–108)
CHLORIDE SERPL-SCNC: 86 MMOL/L — LOW (ref 96–108)
CO2 SERPL-SCNC: 23 MMOL/L — SIGNIFICANT CHANGE UP (ref 22–31)
CO2 SERPL-SCNC: 25 MMOL/L — SIGNIFICANT CHANGE UP (ref 22–31)
CREAT SERPL-MCNC: 0.71 MG/DL — SIGNIFICANT CHANGE UP (ref 0.5–1.3)
CREAT SERPL-MCNC: 0.77 MG/DL — SIGNIFICANT CHANGE UP (ref 0.5–1.3)
EOSINOPHIL # BLD AUTO: 0.06 K/UL — SIGNIFICANT CHANGE UP (ref 0–0.5)
EOSINOPHIL NFR BLD AUTO: 0.8 % — SIGNIFICANT CHANGE UP (ref 0–6)
GLUCOSE BLDC GLUCOMTR-MCNC: 112 MG/DL — HIGH (ref 70–99)
GLUCOSE BLDC GLUCOMTR-MCNC: 189 MG/DL — HIGH (ref 70–99)
GLUCOSE BLDC GLUCOMTR-MCNC: 191 MG/DL — HIGH (ref 70–99)
GLUCOSE BLDC GLUCOMTR-MCNC: 210 MG/DL — HIGH (ref 70–99)
GLUCOSE SERPL-MCNC: 203 MG/DL — HIGH (ref 70–99)
GLUCOSE SERPL-MCNC: 225 MG/DL — HIGH (ref 70–99)
HCT VFR BLD CALC: 28.8 % — LOW (ref 34.5–45)
HGB BLD-MCNC: 9 G/DL — LOW (ref 11.5–15.5)
IMM GRANULOCYTES NFR BLD AUTO: 3.7 % — HIGH (ref 0–1.5)
LACTATE SERPL-SCNC: 2.1 MMOL/L — HIGH (ref 0.7–2)
LYMPHOCYTES # BLD AUTO: 1.42 K/UL — SIGNIFICANT CHANGE UP (ref 1–3.3)
LYMPHOCYTES # BLD AUTO: 18.3 % — SIGNIFICANT CHANGE UP (ref 13–44)
MAGNESIUM SERPL-MCNC: 1.4 MG/DL — LOW (ref 1.6–2.6)
MAGNESIUM SERPL-MCNC: 1.7 MG/DL — SIGNIFICANT CHANGE UP (ref 1.6–2.6)
MCHC RBC-ENTMCNC: 23.1 PG — LOW (ref 27–34)
MCHC RBC-ENTMCNC: 31.3 GM/DL — LOW (ref 32–36)
MCV RBC AUTO: 74 FL — LOW (ref 80–100)
MONOCYTES # BLD AUTO: 0.82 K/UL — SIGNIFICANT CHANGE UP (ref 0–0.9)
MONOCYTES NFR BLD AUTO: 10.6 % — SIGNIFICANT CHANGE UP (ref 2–14)
NEUTROPHILS # BLD AUTO: 5.05 K/UL — SIGNIFICANT CHANGE UP (ref 1.8–7.4)
NEUTROPHILS NFR BLD AUTO: 64.9 % — SIGNIFICANT CHANGE UP (ref 43–77)
PHOSPHATE SERPL-MCNC: 4.2 MG/DL — SIGNIFICANT CHANGE UP (ref 2.5–4.5)
PLATELET # BLD AUTO: 278 K/UL — SIGNIFICANT CHANGE UP (ref 150–400)
POTASSIUM SERPL-MCNC: 3.4 MMOL/L — LOW (ref 3.5–5.3)
POTASSIUM SERPL-MCNC: 3.5 MMOL/L — SIGNIFICANT CHANGE UP (ref 3.5–5.3)
POTASSIUM SERPL-SCNC: 3.4 MMOL/L — LOW (ref 3.5–5.3)
POTASSIUM SERPL-SCNC: 3.5 MMOL/L — SIGNIFICANT CHANGE UP (ref 3.5–5.3)
PROT SERPL-MCNC: 7.2 G/DL — SIGNIFICANT CHANGE UP (ref 6–8.3)
RBC # BLD: 3.89 M/UL — SIGNIFICANT CHANGE UP (ref 3.8–5.2)
RBC # FLD: 23.7 % — HIGH (ref 10.3–14.5)
SODIUM SERPL-SCNC: 126 MMOL/L — LOW (ref 135–145)
SODIUM SERPL-SCNC: 128 MMOL/L — LOW (ref 135–145)
WBC # BLD: 7.77 K/UL — SIGNIFICANT CHANGE UP (ref 3.8–10.5)
WBC # FLD AUTO: 7.77 K/UL — SIGNIFICANT CHANGE UP (ref 3.8–10.5)

## 2019-03-25 PROCEDURE — 99232 SBSQ HOSP IP/OBS MODERATE 35: CPT

## 2019-03-25 RX ORDER — MAGNESIUM OXIDE 400 MG ORAL TABLET 241.3 MG
400 TABLET ORAL
Qty: 0 | Refills: 0 | Status: COMPLETED | OUTPATIENT
Start: 2019-03-25 | End: 2019-03-28

## 2019-03-25 RX ORDER — POTASSIUM CHLORIDE 20 MEQ
40 PACKET (EA) ORAL EVERY 4 HOURS
Qty: 0 | Refills: 0 | Status: DISCONTINUED | OUTPATIENT
Start: 2019-03-25 | End: 2019-03-25

## 2019-03-25 RX ORDER — CALCITRIOL 0.5 UG/1
0.5 CAPSULE ORAL DAILY
Qty: 0 | Refills: 0 | Status: DISCONTINUED | OUTPATIENT
Start: 2019-03-25 | End: 2019-03-28

## 2019-03-25 RX ORDER — POTASSIUM CHLORIDE 20 MEQ
40 PACKET (EA) ORAL EVERY 4 HOURS
Qty: 0 | Refills: 0 | Status: COMPLETED | OUTPATIENT
Start: 2019-03-25 | End: 2019-03-26

## 2019-03-25 RX ORDER — MAGNESIUM SULFATE 500 MG/ML
1 VIAL (ML) INJECTION ONCE
Qty: 0 | Refills: 0 | Status: COMPLETED | OUTPATIENT
Start: 2019-03-25 | End: 2019-03-25

## 2019-03-25 RX ORDER — ALTEPLASE 100 MG
2 KIT INTRAVENOUS ONCE
Qty: 0 | Refills: 0 | Status: DISCONTINUED | OUTPATIENT
Start: 2019-03-25 | End: 2019-03-25

## 2019-03-25 RX ADMIN — MILRINONE LACTATE 11.98 MICROGRAM(S)/KG/MIN: 1 INJECTION, SOLUTION INTRAVENOUS at 13:24

## 2019-03-25 RX ADMIN — MILRINONE LACTATE 11.98 MICROGRAM(S)/KG/MIN: 1 INJECTION, SOLUTION INTRAVENOUS at 05:34

## 2019-03-25 RX ADMIN — GABAPENTIN 100 MILLIGRAM(S): 400 CAPSULE ORAL at 05:33

## 2019-03-25 RX ADMIN — Medication 4: at 07:53

## 2019-03-25 RX ADMIN — Medication 1 TABLET(S): at 17:51

## 2019-03-25 RX ADMIN — Medication 40 MILLIEQUIVALENT(S): at 20:59

## 2019-03-25 RX ADMIN — Medication 2: at 17:51

## 2019-03-25 RX ADMIN — BENZOCAINE AND MENTHOL 1 LOZENGE: 5; 1 LIQUID ORAL at 14:45

## 2019-03-25 RX ADMIN — MAGNESIUM OXIDE 400 MG ORAL TABLET 400 MILLIGRAM(S): 241.3 TABLET ORAL at 16:53

## 2019-03-25 RX ADMIN — CEFTRIAXONE 100 GRAM(S): 500 INJECTION, POWDER, FOR SOLUTION INTRAMUSCULAR; INTRAVENOUS at 12:46

## 2019-03-25 RX ADMIN — CHLORHEXIDINE GLUCONATE 1 APPLICATION(S): 213 SOLUTION TOPICAL at 13:25

## 2019-03-25 RX ADMIN — MAGNESIUM OXIDE 400 MG ORAL TABLET 400 MILLIGRAM(S): 241.3 TABLET ORAL at 13:24

## 2019-03-25 RX ADMIN — GABAPENTIN 100 MILLIGRAM(S): 400 CAPSULE ORAL at 17:51

## 2019-03-25 RX ADMIN — CALCITRIOL 0.5 MICROGRAM(S): 0.5 CAPSULE ORAL at 13:24

## 2019-03-25 RX ADMIN — BUMETANIDE 2 MILLIGRAM(S): 0.25 INJECTION INTRAMUSCULAR; INTRAVENOUS at 05:33

## 2019-03-25 RX ADMIN — Medication 100 GRAM(S): at 16:53

## 2019-03-25 NOTE — PROGRESS NOTE ADULT - ASSESSMENT
62 yo F h/o severe cardiomyopathy c/b CHF w EF 10% s/p AICD on home milrinone infusion via PICC, afib on aspirin & elliquis, DM2, HTN, thyroid cancer s/p thyroidectomy presents for mechanical fall, found to have SDH, admitted to MICU for multifactorial shock likely cardiogenic and septic shock i/s/o possible PNA.    Sepsis  with Bacteremia K Pneumonia  : -ID helping.   - IV Rocephin     Mechanical Fall with SDH   - SDH stable on repeat CT head.   - frequent neuro checks  - appreciate neurosurgery recs    Severe cardiomyopathy c/b CHF s/p AICD on home milrinone infusion via PICC with Shock ; Multifactorial shock w cardiogenic & septic shock i/s/o PNA. Management per  Cardiology, HF Team and EP.   - amiodarone 150 mg stat  - c/w milrinone gtt  - TTE    Anemia   : -Chronic.  - Daily CBC    Hypocalcemia ,Hypokalemia & hypomagnesemia. Cr stable. Renal helping.   - monitor BMP, replete for K > 4, Mg > 2    Congestive hepatopathy : Sec to CHF .Abd mildly distended, non-tender. Cholelithiasis on prior US. Lipase 140. Ammonia wnl.   - Stable.     DM2; Sugars in good range.   - ISS q6h    Thyroid cancer s/p thyroidectomy  - on synthroid     - DVT ppx: SCDs, hold pharmacological ppx  - tx SDH as above

## 2019-03-25 NOTE — PROGRESS NOTE ADULT - ASSESSMENT
63 year old female PMH severe cardiomyopathy c/b CHF w EF 10% s/p AICD on home milrinone infusion via PICC, afib on aspirin & Eliquis, DM2, HTN, thyroid cancer s/p thyroidectomy who presented for mechanical fall on 3/19/19. In the ED febrile to 101.8, tachycardic to > 130, hypotensive to SBP in the 80's. WBC on presentation of 20.8 with 83% PMN. Transaminitis with Alk Phos of 451, , ALT of 43 and T Bili of 4.7. Lactic acid of 2.5. U/A with negative leukocyte esterase and negative nitrites. RVP negative. CT Chest/Abdomen/Pelvis with no obvious intraabdominal pathology and chest with ground glass opacities in the right middle and right lower lobes (present on 11/6/18). Blood cultures resulted with Klebsiella pneumoniae.     Overall, Klebsiella bacteremia likely due to PICC infection - patient with chills with Milrinone infusion changes. Pneumonia is possible however, the ground glass opacities in the lungs were present back in 11/2018. Unsure about etiology of new leukocytosis today - would obtain differentiation and repeat CBC. Would escalate from Aztreonam to Meropenem given vasopressor requirements. Would continue Vancomycin until BCx at 48 hours.      --Followup Blood Cultures NGTD  - PICC line removed  --Followup susceptibilities of Klebsiella pneumoniae- pan sensitive , abs changed to ceftriaxone  -WBC improved  - d/c jenni  -day #5 since last postive blood culture. Will treat for 10-14 days, might be able to change to po quinolone after 7 days IV

## 2019-03-25 NOTE — PROGRESS NOTE ADULT - SUBJECTIVE AND OBJECTIVE BOX
Patient is a 63y old  Female who presents with a chief complaint of mechanical fall (25 Mar 2019 13:44)    Being followed by ID for        Interval history:  No other acute events      ROS:  No cough,SOB,CP  No N/V/D  No abd pain  No urinary complaints  No HA  No joint or limb pain  No other complaints    PAST MEDICAL & SURGICAL HISTORY:  Asthma  CHF (congestive heart failure): with EF of 10% s/p AICD  DM (diabetes mellitus)  HTN (hypertension)  Thyroid ca  AICD (automatic cardioverter/defibrillator) present  S/P thyroidectomy    Allergies    Isordil (Headache)  penicillin (Rash)    Intolerances      Antimicrobials:    cefTRIAXone   IVPB      cefTRIAXone   IVPB 2 Gram(s) IV Intermittent every 24 hours  rifaximin 550 milliGRAM(s) Oral two times a day    MEDICATIONS  (STANDING):  buMETAnide 2 milliGRAM(s) Oral daily  calcitriol   Capsule 0.5 MICROGram(s) Oral daily  calcium carbonate 1250 mG  + Vitamin D (OsCal 500 + D) 1 Tablet(s) Oral two times a day  cefTRIAXone   IVPB      cefTRIAXone   IVPB 2 Gram(s) IV Intermittent every 24 hours  chlorhexidine 4% Liquid 1 Application(s) Topical <User Schedule>  dextrose 5%. 1000 milliLiter(s) (50 mL/Hr) IV Continuous <Continuous>  dextrose 50% Injectable 12.5 Gram(s) IV Push once  dextrose 50% Injectable 25 Gram(s) IV Push once  dextrose 50% Injectable 25 Gram(s) IV Push once  gabapentin 100 milliGRAM(s) Oral every 12 hours  insulin lispro (HumaLOG) corrective regimen sliding scale   SubCutaneous three times a day before meals  insulin lispro (HumaLOG) corrective regimen sliding scale   SubCutaneous at bedtime  magnesium oxide 400 milliGRAM(s) Oral three times a day with meals  milrinone Infusion 0.5 MICROgram(s)/kG/Min (11.985 mL/Hr) IV Continuous <Continuous>  rifaximin 550 milliGRAM(s) Oral two times a day      Vital Signs Last 24 Hrs  T(C): 36.3 (03-25-19 @ 13:40), Max: 37.1 (03-24-19 @ 23:52)  T(F): 97.4 (03-25-19 @ 13:40), Max: 98.7 (03-24-19 @ 23:52)  HR: 105 (03-25-19 @ 13:40) (105 - 115)  BP: 90/64 (03-25-19 @ 13:40) (90/64 - 103/63)  BP(mean): --  RR: 16 (03-25-19 @ 13:40) (16 - 16)  SpO2: 100% (03-25-19 @ 13:40) (92% - 100%)    Physical Exam:    Constitutional well preserved,comfortable,pleasant    HEENT PERRLA EOMI,No pallor or icterus    No oral exudate or erythema    Neck supple no JVD or LN    Chest Good AE,CTA    CVS RRR S1 S2 WNl No murmur or rub or gallop    Abd soft BS normal No tenderness no masses    Ext No cyanosis clubbing or edema    IV site no erythema tenderness or discharge    Joints no swelling or LOM    CNS AAO X 3 no focal    Lab Data:                          8.8    7.25  )-----------( 223      ( 24 Mar 2019 08:58 )             27.1       03-25    126<L>  |  85<L>  |  14  ----------------------------<  225<H>  3.5   |  25  |  0.71    Ca    5.7<LL>      25 Mar 2019 13:40  Phos  4.2     03-25  Mg     1.4     03-25            .Blood Blood  03-22-19   No growth to date.  --  --      .Blood Blood-Peripheral 1 AEROBIC BOTTLE REC'D  03-20-19   Growth in aerobic bottle: Klebsiella pneumoniae  "Due to technical problems, Proteus sp. will Not be reported as part of  the BCID panel until further notice"  ***Blood Panel PCR results on this specimen are available  approximately 3 hours after the Gram stain result.***  Gram stain, PCR, and/or culture results may not always  correspond due to difference in methodologies.  ************************************************************  This PCR assay was performed using [employer].  The following targets are tested for: Enterococcus,  vancomycin resistant enterococci, Listeria monocytogenes,  coagulase negative staphylococci, S. aureus,  methicillin resistant S. aureus, Streptococcus agalactiae  (Group B), S. pneumoniae, S. pyogenes (Group A),  Acinetobacter baumannii, Enterobacter cloacae, E. coli,  Klebsiella oxytoca, K. pneumoniae, Proteus sp.,  Serratia marcescens, Haemophilus influenzae,  Neisseria meningitidis, Pseudomonas aeruginosa, Candida  albicans, C. glabrata, C krusei, C parapsilosis,  C. tropicalis and the KPC resistance gene.  --  Blood Culture PCR  Klebsiella pneumoniae      .Blood PICC Tip  03-20-19   Growth in aerobic bottle: Klebsiella pneumoniae  See previous culture [id number]  --    Growth in aerobic bottle: Gram Negative Rods    Culture - Blood (03.20.19 @ 06:14)    Gram Stain:   Growth in aerobic bottle: Gram Negative Rods    -  Levofloxacin: S <=2    -  Meropenem: S <=1    -  Amikacin: S <=16    -  Ampicillin/Sulbactam: S <=8/4    -  Aztreonam: S <=4    -  Cefepime: S <=4    -  Ceftriaxone: S <=1 Enterobacter, Citrobacter, and Serratia may develop resistance during prolonged therapy    -  Ciprofloxacin: S <=1    -  Ertapenem: S <=1    -  Gentamicin: S <=4    -  Imipenem: S <=1    -  Piperacillin/Tazobactam: S <=16    -  Tobramycin: S <=4    -  Trimethoprim/Sulfamethoxazole: S <=2/38    -  Klebsiella pneumoniae: Detec    -  Ampicillin: R >16 These ampicillin results predict results for amoxicillin    -  Cefazolin: S <=8    -  Cefoxitin: S <=8    Specimen Source: .Blood Blood-Peripheral 1 AEROBIC BOTTLE REC'D    Organism: Blood Culture PCR    Organism: Klebsiella pneumoniae    Culture Results:   Growth in aerobic bottle: Klebsiella pneumoniae  "Due to technical problems, Proteus sp. will Not be reported as part of  the BCID panel until further notice"  ***Blood Panel PCR results on this specimen are available  approximately 3 hours after the Gram stain result.***  Gram stain, PCR, and/or culture results may not always  correspond due to difference in methodologies.  ************************************************************  This PCR assay was performed using [employer].  The following targets are tested for: Enterococcus,  vancomycin resistant enterococci, Listeria monocytogenes,  coagulase negative staphylococci, S. aureus,  methicillin resistant S. aureus, Streptococcus agalactiae  (Group B), S. pneumoniae, S. pyogenes (Group A),  Acinetobacter baumannii, Enterobacter cloacae, E. coli,  Klebsiella oxytoca, K. pneumoniae, Proteus sp.,  Serratia marcescens, Haemophilus influenzae,  Neisseria meningitidis, Pseudomonas aeruginosa, Candida  albicans, C. glabrata, C krusei, C parapsilosis,  C. tropicalis and the KPC resistance gene.    Organism Identification: Blood Culture PCR  Klebsiella pneumoniae    Method Type: PCR    Method Type: JOSÉ MIGUEL        WBC Count: 7.25 (03-24-19 @ 08:58)  WBC Count: 8.47 (03-23-19 @ 19:28)  WBC Count: 10.12 (03-23-19 @ 09:49)  WBC Count: 19.5 (03-22-19 @ 00:39)  WBC Count: 28.5 (03-21-19 @ 17:33)  WBC Count: 38.6 (03-21-19 @ 04:27)  WBC Count: 56.2 (03-20-19 @ 17:51)  WBC Count: 61.0 (03-20-19 @ 16:09)  WBC Count: 14.64 (03-20-19 @ 08:44)  WBC Count: 27.0 (03-20-19 @ 08:41)  WBC Count: 20.8 (03-19-19 @ 20:16) Patient is a 63y old  Female who presents with a chief complaint of mechanical fall (25 Mar 2019 13:44)    Being followed by ID for bacteremia        Interval history:  pt unwilling to get a new PICC line  feeling improved  tolerating the antibiotics  no GI sxs  breathing stable  No other acute event    PAST MEDICAL & SURGICAL HISTORY:  Asthma  CHF (congestive heart failure): with EF of 10% s/p AICD  DM (diabetes mellitus)  HTN (hypertension)  Thyroid ca  AICD (automatic cardioverter/defibrillator) present  S/P thyroidectomy    Allergies    Isordil (Headache)  penicillin (Rash)    Intolerances      Antimicrobials:    cefTRIAXone   IVPB      cefTRIAXone   IVPB 2 Gram(s) IV Intermittent every 24 hours  rifaximin 550 milliGRAM(s) Oral two times a day    MEDICATIONS  (STANDING):  buMETAnide 2 milliGRAM(s) Oral daily  calcitriol   Capsule 0.5 MICROGram(s) Oral daily  calcium carbonate 1250 mG  + Vitamin D (OsCal 500 + D) 1 Tablet(s) Oral two times a day  cefTRIAXone   IVPB      cefTRIAXone   IVPB 2 Gram(s) IV Intermittent every 24 hours  chlorhexidine 4% Liquid 1 Application(s) Topical <User Schedule>  dextrose 5%. 1000 milliLiter(s) (50 mL/Hr) IV Continuous <Continuous>  dextrose 50% Injectable 12.5 Gram(s) IV Push once  dextrose 50% Injectable 25 Gram(s) IV Push once  dextrose 50% Injectable 25 Gram(s) IV Push once  gabapentin 100 milliGRAM(s) Oral every 12 hours  insulin lispro (HumaLOG) corrective regimen sliding scale   SubCutaneous three times a day before meals  insulin lispro (HumaLOG) corrective regimen sliding scale   SubCutaneous at bedtime  magnesium oxide 400 milliGRAM(s) Oral three times a day with meals  milrinone Infusion 0.5 MICROgram(s)/kG/Min (11.985 mL/Hr) IV Continuous <Continuous>  rifaximin 550 milliGRAM(s) Oral two times a day      Vital Signs Last 24 Hrs  T(C): 36.3 (03-25-19 @ 13:40), Max: 37.1 (03-24-19 @ 23:52)  T(F): 97.4 (03-25-19 @ 13:40), Max: 98.7 (03-24-19 @ 23:52)  HR: 105 (03-25-19 @ 13:40) (105 - 115)  BP: 90/64 (03-25-19 @ 13:40) (90/64 - 103/63)  BP(mean): --  RR: 16 (03-25-19 @ 13:40) (16 - 16)  SpO2: 100% (03-25-19 @ 13:40) (92% - 100%)    Physical Exam:    Constitutional well preserved,comfortable,pleasant    HEENT PERRLA EOMI,No pallor or icterus    No oral exudate or erythema    Neck supple no JVD or LN    Chest Good AE,CTA    CVS RRR S1 S2 WNl    Abd soft BS normal No tenderness    Ext No cyanosis clubbing or edema    IV site no erythema tenderness or discharge    Joints no swelling or LOM    CNS AAO X 3 no focal    Lab Data:                          8.8    7.25  )-----------( 223      ( 24 Mar 2019 08:58 )             27.1       03-25    126<L>  |  85<L>  |  14  ----------------------------<  225<H>  3.5   |  25  |  0.71    Ca    5.7<LL>      25 Mar 2019 13:40  Phos  4.2     03-25  Mg     1.4     03-25      < from: 12 Lead ECG (03.23.19 @ 07:28) >  Ventricular Rate 115 BPM    Atrial Rate 115 BPM    P-R Interval 156 ms    QRS Duration 84 ms    Q-T Interval 354 ms    QTC Calculation(Bezet) 489 ms    P Axis 28 degrees    R Axis -14 degrees    T Axis -10 degrees    Diagnosis Line SINUS TACHYCARDIA  OTHERWISE NORMAL ECG    < end of copied text >        .Blood Blood  03-22-19   No growth to date.  --  --      .Blood Blood-Peripheral 1 AEROBIC BOTTLE REC'D  03-20-19   Growth in aerobic bottle: Klebsiella pneumoniae  "Due to technical problems, Proteus sp. will Not be reported as part of  the BCID panel until further notice"  ***Blood Panel PCR results on this specimen are available  approximately 3 hours after the Gram stain result.***  Gram stain, PCR, and/or culture results may not always  correspond due to difference in methodologies.  ************************************************************  This PCR assay was performed using [employer].  The following targets are tested for: Enterococcus,  vancomycin resistant enterococci, Listeria monocytogenes,  coagulase negative staphylococci, S. aureus,  methicillin resistant S. aureus, Streptococcus agalactiae  (Group B), S. pneumoniae, S. pyogenes (Group A),  Acinetobacter baumannii, Enterobacter cloacae, E. coli,  Klebsiella oxytoca, K. pneumoniae, Proteus sp.,  Serratia marcescens, Haemophilus influenzae,  Neisseria meningitidis, Pseudomonas aeruginosa, Candida  albicans, C. glabrata, C krusei, C parapsilosis,  C. tropicalis and the KPC resistance gene.  --  Blood Culture PCR  Klebsiella pneumoniae      .Blood PICC Tip  03-20-19   Growth in aerobic bottle: Klebsiella pneumoniae  See previous culture [id number]  --    Growth in aerobic bottle: Gram Negative Rods    Culture - Blood (03.20.19 @ 06:14)    Gram Stain:   Growth in aerobic bottle: Gram Negative Rods    -  Levofloxacin: S <=2    -  Meropenem: S <=1    -  Amikacin: S <=16    -  Ampicillin/Sulbactam: S <=8/4    -  Aztreonam: S <=4    -  Cefepime: S <=4    -  Ceftriaxone: S <=1 Enterobacter, Citrobacter, and Serratia may develop resistance during prolonged therapy    -  Ciprofloxacin: S <=1    -  Ertapenem: S <=1    -  Gentamicin: S <=4    -  Imipenem: S <=1    -  Piperacillin/Tazobactam: S <=16    -  Tobramycin: S <=4    -  Trimethoprim/Sulfamethoxazole: S <=2/38    -  Klebsiella pneumoniae: Detec    -  Ampicillin: R >16 These ampicillin results predict results for amoxicillin    -  Cefazolin: S <=8    -  Cefoxitin: S <=8    Specimen Source: .Blood Blood-Peripheral 1 AEROBIC BOTTLE REC'D    Organism: Blood Culture PCR    Organism: Klebsiella pneumoniae    Culture Results:   Growth in aerobic bottle: Klebsiella pneumoniae  "Due to technical problems, Proteus sp. will Not be reported as part of  the BCID panel until further notice"  ***Blood Panel PCR results on this specimen are available  approximately 3 hours after the Gram stain result.***  Gram stain, PCR, and/or culture results may not always  correspond due to difference in methodologies.  ************************************************************  This PCR assay was performed using [employer].  The following targets are tested for: Enterococcus,  vancomycin resistant enterococci, Listeria monocytogenes,  coagulase negative staphylococci, S. aureus,  methicillin resistant S. aureus, Streptococcus agalactiae  (Group B), S. pneumoniae, S. pyogenes (Group A),  Acinetobacter baumannii, Enterobacter cloacae, E. coli,  Klebsiella oxytoca, K. pneumoniae, Proteus sp.,  Serratia marcescens, Haemophilus influenzae,  Neisseria meningitidis, Pseudomonas aeruginosa, Candida  albicans, C. glabrata, C krusei, C parapsilosis,  C. tropicalis and the KPC resistance gene.    Organism Identification: Blood Culture PCR  Klebsiella pneumoniae    Method Type: PCR    Method Type: JOSÉ MIGUEL        WBC Count: 7.25 (03-24-19 @ 08:58)  WBC Count: 8.47 (03-23-19 @ 19:28)  WBC Count: 10.12 (03-23-19 @ 09:49)  WBC Count: 19.5 (03-22-19 @ 00:39)  WBC Count: 28.5 (03-21-19 @ 17:33)  WBC Count: 38.6 (03-21-19 @ 04:27)  WBC Count: 56.2 (03-20-19 @ 17:51)  WBC Count: 61.0 (03-20-19 @ 16:09)  WBC Count: 14.64 (03-20-19 @ 08:44)  WBC Count: 27.0 (03-20-19 @ 08:41)  WBC Count: 20.8 (03-19-19 @ 20:16)      < from: CT Thoracic Spine Reform No Cont (03.19.19 @ 22:32) >    No obvious acute intrathoracic, intra-abdominal or pelvic solid oval,   visceral or vascular injury, given the extent of artifact. No obvious   acute fracture or traumatic malalignment.    Groundglass opacities in the right middle and right lower lobes, which   were noted on 11/6/2018. Differential diagnosis includes pulmonary edema,   infection and inflammation although neoplasm cannot be excluded.   Recommend follow-up.    Additional findings as described.          < end of copied text >

## 2019-03-25 NOTE — PROVIDER CONTACT NOTE (CRITICAL VALUE NOTIFICATION) - BACKGROUND
Pt admitted for traumatic SD hemorrhage w/no LOC, sepsis w/cardiogenic shock. Previous serum calcium from 13:40 was 5.7.  S/p PO calcium supplementation at 17:00. BP: 92/73, HR; 107, T: 98.4, RR: 16, o2: 99% on RA. K from BMP 3.4.

## 2019-03-25 NOTE — PROGRESS NOTE ADULT - SUBJECTIVE AND OBJECTIVE BOX
INTERVAL HPI/OVERNIGHT EVENTS: i feel fine .   Vital Signs Last 24 Hrs  T(C): 36.3 (25 Mar 2019 13:40), Max: 37.1 (24 Mar 2019 23:52)  T(F): 97.4 (25 Mar 2019 13:40), Max: 98.7 (24 Mar 2019 23:52)  HR: 105 (25 Mar 2019 13:40) (105 - 115)  BP: 90/64 (25 Mar 2019 13:40) (90/64 - 103/63)  BP(mean): --  RR: 16 (25 Mar 2019 13:40) (16 - 16)  SpO2: 100% (25 Mar 2019 13:40) (92% - 100%)  I&O's Summary    24 Mar 2019 07:01  -  25 Mar 2019 07:00  --------------------------------------------------------  IN: 984 mL / OUT: 2800 mL / NET: -1816 mL    25 Mar 2019 07:01  -  25 Mar 2019 13:45  --------------------------------------------------------  IN: 480 mL / OUT: 1200 mL / NET: -720 mL      MEDICATIONS  (STANDING):  buMETAnide 2 milliGRAM(s) Oral daily  calcitriol   Capsule 0.5 MICROGram(s) Oral daily  calcium carbonate 1250 mG  + Vitamin D (OsCal 500 + D) 1 Tablet(s) Oral two times a day  cefTRIAXone   IVPB      cefTRIAXone   IVPB 2 Gram(s) IV Intermittent every 24 hours  chlorhexidine 4% Liquid 1 Application(s) Topical <User Schedule>  dextrose 5%. 1000 milliLiter(s) (50 mL/Hr) IV Continuous <Continuous>  dextrose 50% Injectable 12.5 Gram(s) IV Push once  dextrose 50% Injectable 25 Gram(s) IV Push once  dextrose 50% Injectable 25 Gram(s) IV Push once  gabapentin 100 milliGRAM(s) Oral every 12 hours  insulin lispro (HumaLOG) corrective regimen sliding scale   SubCutaneous three times a day before meals  insulin lispro (HumaLOG) corrective regimen sliding scale   SubCutaneous at bedtime  magnesium oxide 400 milliGRAM(s) Oral three times a day with meals  milrinone Infusion 0.5 MICROgram(s)/kG/Min (11.985 mL/Hr) IV Continuous <Continuous>  rifaximin 550 milliGRAM(s) Oral two times a day    MEDICATIONS  (PRN):  benzocaine 15 mG/menthol 3.6 mG Lozenge 1 Lozenge Oral every 4 hours PRN Sore Throat  dextrose 40% Gel 15 Gram(s) Oral once PRN Blood Glucose LESS THAN 70 milliGRAM(s)/deciliter  glucagon  Injectable 1 milliGRAM(s) IntraMuscular once PRN Glucose LESS THAN 70 milligrams/deciliter    LABS:                        8.8    7.25  )-----------( 223      ( 24 Mar 2019 08:58 )             27.1     03-24    131<L>  |  89<L>  |  12  ----------------------------<  185<H>  3.4<L>   |  25  |  0.76    Ca    6.6<L>      24 Mar 2019 05:52  Mg     1.4     03-24    TPro  7.0  /  Alb  2.8<L>  /  TBili  3.7<H>  /  DBili  x   /  AST  43<H>  /  ALT  29  /  AlkPhos  374<H>  03-23        CAPILLARY BLOOD GLUCOSE      POCT Blood Glucose.: 112 mg/dL (25 Mar 2019 11:22)  POCT Blood Glucose.: 210 mg/dL (25 Mar 2019 07:27)  POCT Blood Glucose.: 249 mg/dL (24 Mar 2019 21:04)  POCT Blood Glucose.: 186 mg/dL (24 Mar 2019 17:04)          REVIEW OF SYSTEMS:  CONSTITUTIONAL: No fever, weight loss, or fatigue  EYES: No eye pain, visual disturbances, or discharge  ENMT:  No difficulty hearing, tinnitus, vertigo; No sinus or throat pain  NECK: No pain or stiffness  RESPIRATORY: No cough, wheezing, chills or hemoptysis; No shortness of breath  CARDIOVASCULAR: No chest pain, palpitations, dizziness, or leg swelling  GASTROINTESTINAL: No abdominal or epigastric pain. No nausea, vomiting, or hematemesis; No diarrhea or constipation. No melena or hematochezia.  GENITOURINARY: No dysuria, frequency, hematuria, or incontinence  NEUROLOGICAL: No headaches, memory loss, loss of strength, numbness, or tremors      Consultant(s) Notes Reviewed:  [x ] YES  [ ] NO    PHYSICAL EXAM:  GENERAL: NAD, well-groomed, well-developed ,not in any distress ,  HEAD:  Atraumatic, Normocephalic  NECK: JVD++ and IJ catheter   NERVOUS SYSTEM:  Alert & Oriented X3, No focal deficit   CHEST/LUNG: Good air entry bilateral with no  rales, rhonchi, wheezing, or rubs  HEART: Regular rate and rhythm; No murmurs, rubs, or gallops  ABDOMEN: Soft, Nontender, Nondistended; Bowel sounds present  EXTREMITIES:  2+ Peripheral Pulses, No clubbing, cyanosis, or edema    Care Discussed with Consultants/Other Providers [ x] YES  [ ] NO

## 2019-03-25 NOTE — PROVIDER CONTACT NOTE (OTHER) - ASSESSMENT
NAD, R TLC dressing intact, 2/3 lumens flushing wdl, 1 lumen unable to flush  pt receiving primacor gtt through catheter

## 2019-03-25 NOTE — CHART NOTE - NSCHARTNOTEFT_GEN_A_CORE
Notified by RN of pt's Calium Notified by RN of pt's Calcium 5.7. Pt placed on calcitriol by Renal. Spoke with Dr. Rojas who will contact Renal, Dr. Wolfe. Will continue to monitor.     Bhargav Gonzales NP  [id number]

## 2019-03-25 NOTE — PHARMACOTHERAPY INTERVENTION NOTE - COMMENTS
Patient on levothyroxine 175 mcg daily at home, not ordered inpatient. Thyroid panel from 3/20/19 with TSH 6.18, T4 4.5, T3 44. Recommended resume levothyroxine therapy if no contraindications.    Also recommended BMP to assess for need for potassium supplementation (received 40 mEq PO for K 3.4 yesterday, discussed with renal Dr. Wolfe).    Danielle Lopez, PharmD   [phone]

## 2019-03-25 NOTE — PROVIDER CONTACT NOTE (CRITICAL VALUE NOTIFICATION) - NAME OF MD/NP/PA/DO NOTIFIED:
RICHARD Mac met pt w spouse, pt had molst from 6/16, unable to locate form, verified hcp, molst discussed: pt agrees to dnr, had to use more time for dni (lay terms), after long discussion pt agrees to dni. support given for pt. contact # given.

## 2019-03-25 NOTE — PROVIDER CONTACT NOTE (CRITICAL VALUE NOTIFICATION) - ASSESSMENT
Pt resting comfortably in bed. Denies any CP SOB or any other discomfort. BP: 92/73, HR; 107, T: 98.4, RR: 16, o2: 99% on RA. K from BMP 3.4.

## 2019-03-25 NOTE — PROGRESS NOTE ADULT - SUBJECTIVE AND OBJECTIVE BOX
NEPHROLOGY-[facility] [phone]        Patient seen and examined in bed.  On Milrinone gtt at present         MEDICATIONS  (STANDING):  buMETAnide 2 milliGRAM(s) Oral daily  cefTRIAXone   IVPB      cefTRIAXone   IVPB 2 Gram(s) IV Intermittent every 24 hours  chlorhexidine 4% Liquid 1 Application(s) Topical <User Schedule>  dextrose 5%. 1000 milliLiter(s) (50 mL/Hr) IV Continuous <Continuous>  dextrose 50% Injectable 12.5 Gram(s) IV Push once  dextrose 50% Injectable 25 Gram(s) IV Push once  dextrose 50% Injectable 25 Gram(s) IV Push once  gabapentin 100 milliGRAM(s) Oral every 12 hours  insulin lispro (HumaLOG) corrective regimen sliding scale   SubCutaneous three times a day before meals  insulin lispro (HumaLOG) corrective regimen sliding scale   SubCutaneous at bedtime  milrinone Infusion 0.5 MICROgram(s)/kG/Min (11.985 mL/Hr) IV Continuous <Continuous>  rifaximin 550 milliGRAM(s) Oral two times a day      VITAL:  T(C): , Max: 37.1 (03-24-19 @ 23:52)  T(F): , Max: 98.7 (03-24-19 @ 23:52)  HR: 115 (03-24-19 @ 23:52)  BP: 91/65 (03-24-19 @ 23:52)  BP(mean): --  RR: 16 (03-24-19 @ 23:52)  SpO2: 97% (03-24-19 @ 23:52)  Wt(kg): --    I and O's:    03-24 @ 07:01  -  03-25 @ 07:00  --------------------------------------------------------  IN: 984 mL / OUT: 2800 mL / NET: -1816 mL          PHYSICAL EXAM:    Constitutional: NAD  Neck:  +  JVD  Respiratory: CTAB/L  Cardiovascular: S1 and S2  Gastrointestinal: BS+, soft, NT/ND  Extremities: +  peripheral edema  Neurological: A/O x 3, no focal deficits  Psychiatric: Normal mood, normal affect  : No Gustafson  Skin: No rashes  Access: Not applicable    LABS:                        8.8    7.25  )-----------( 223      ( 24 Mar 2019 08:58 )             27.1     03-24    131<L>  |  89<L>  |  12  ----------------------------<  185<H>  3.4<L>   |  25  |  0.76    Ca    6.6<L>      24 Mar 2019 05:52  Mg     1.4     03-24    TPro  7.0  /  Alb  2.8<L>  /  TBili  3.7<H>  /  DBili  x   /  AST  43<H>  /  ALT  29  /  AlkPhos  374<H>  03-23          Urine Studies:    Creatinine, Random Urine: 87 mg/dL (03-23 @ 14:17)  Osmolality, Random Urine: 276 mos/kg (03-23 @ 14:17)  Sodium, Random Urine: 22 mmol/L (03-23 @ 14:17)        RADIOLOGY & ADDITIONAL STUDIES:

## 2019-03-25 NOTE — PROGRESS NOTE ADULT - ATTENDING COMMENTS
Agree with above assessment and plan as outlined above.    - ID f/u  - f/u cx    Augusto Grimes MD, [facility]  [facility]PER [phone]

## 2019-03-25 NOTE — PROGRESS NOTE ADULT - ASSESSMENT
The patient is a deidre 63-year-old female with the past medical history of hypertension, diabetes, nonishemic cardiomyopathy, presumed parathyroidectomy, history of thyroid cancer, presents with mechanical fall.  The patient at present has hypervolemic hyponatremia.  She also has hypocalcemia, but again an old finding.    Gram neg rich sepsis  Hyponatremia/Hypocalcemia/Hypomagnesemia        1.	Renal.  Restart her calcitriol and her Os-Donta.   Kdur 40 x 2;  Mag oxide 400mg po tid x 3 days   2.	Cardiology.   Continue with milrinone.  On Bumex 2mg orally daily    3.	Infectious Disease.    Continue with intravenous antibiotics.     4.	Heart failure called--Patient has poor insight into her disease process The patient is a deidre 63-year-old female with the past medical history of hypertension, diabetes, nonishemic cardiomyopathy, presumed parathyroidectomy, history of thyroid cancer, presents with mechanical fall.  The patient at present has hypervolemic hyponatremia.  She also has hypocalcemia, but again an old finding.    Gram neg rich sepsis  Hyponatremia/Hypocalcemia/Hypomagnesemia        1.	Renal.  Restart her calcitriol and her Os-Donta.   Kdur 40 x 2;  Mag oxide 400mg po tid x 3 days   2.	Cardiology.   Continue with milrinone.  On Bumex 2mg orally daily    3.	Infectious Disease.    Continue with intravenous antibiotics.

## 2019-03-25 NOTE — PROGRESS NOTE ADULT - SUBJECTIVE AND OBJECTIVE BOX
pt seen and examined, no complaints, ROS - .       benzocaine 15 mG/menthol 3.6 mG Lozenge 1 Lozenge Oral every 4 hours PRN  buMETAnide 2 milliGRAM(s) Oral daily  cefTRIAXone   IVPB      cefTRIAXone   IVPB 2 Gram(s) IV Intermittent every 24 hours  chlorhexidine 4% Liquid 1 Application(s) Topical <User Schedule>  dextrose 40% Gel 15 Gram(s) Oral once PRN  dextrose 5%. 1000 milliLiter(s) IV Continuous <Continuous>  dextrose 50% Injectable 12.5 Gram(s) IV Push once  dextrose 50% Injectable 25 Gram(s) IV Push once  dextrose 50% Injectable 25 Gram(s) IV Push once  gabapentin 100 milliGRAM(s) Oral every 12 hours  glucagon  Injectable 1 milliGRAM(s) IntraMuscular once PRN  insulin lispro (HumaLOG) corrective regimen sliding scale   SubCutaneous three times a day before meals  insulin lispro (HumaLOG) corrective regimen sliding scale   SubCutaneous at bedtime  milrinone Infusion 0.5 MICROgram(s)/kG/Min IV Continuous <Continuous>  rifaximin 550 milliGRAM(s) Oral two times a day                            8.8    7.25  )-----------( 223      ( 24 Mar 2019 08:58 )             27.1       Hemoglobin: 8.8 g/dL (03-24 @ 08:58)  Hemoglobin: 9.4 g/dL (03-23 @ 19:28)  Hemoglobin: 9.1 g/dL (03-23 @ 09:49)  Hemoglobin: 9.7 g/dL (03-22 @ 00:39)  Hemoglobin: 10.3 g/dL (03-21 @ 17:33)      03-24    131<L>  |  89<L>  |  12  ----------------------------<  185<H>  3.4<L>   |  25  |  0.76    Ca    6.6<L>      24 Mar 2019 05:52  Mg     1.4     03-24    TPro  7.0  /  Alb  2.8<L>  /  TBili  3.7<H>  /  DBili  x   /  AST  43<H>  /  ALT  29  /  AlkPhos  374<H>  03-23    Creatinine Trend: 0.76<--, 0.74<--, 0.85<--, 0.82<--, 0.90<--, 1.00<--    COAGS:           T(C): 37.1 (03-24-19 @ 23:52), Max: 37.1 (03-24-19 @ 23:52)  HR: 115 (03-24-19 @ 23:52) (105 - 115)  BP: 91/65 (03-24-19 @ 23:52) (91/65 - 107/75)  RR: 16 (03-24-19 @ 23:52) (16 - 17)  SpO2: 97% (03-24-19 @ 23:52) (92% - 97%)  Wt(kg): --    I&O's Summary    24 Mar 2019 07:01  -  25 Mar 2019 07:00  --------------------------------------------------------  IN: 840 mL / OUT: 2300 mL / NET: -1460 mL        	  Lymphatic: No lymphadenopathy trace edema in LE bilaterally   Cardiovascular: Normal S1 S2, + JVD, No murmurs , Peripheral pulses palpable 2+ bilaterally  Respiratory: Lungs clear to auscultation  Gastrointestinal:  Soft, Non-tender, + BS	  Skin: No rashes, No ecchymoses, No cyanosis, warm to touch  Psychiatry:  Mood & affect appropriate      TELEMETRY: Afib       ECG: < from: 12 Lead ECG (03.20.19 @ 05:58) >  WIDE QRS TACHYCARDIA  LEFT AXIS DEVIATION  RIGHT BUNDLE BRANCH BLOCK  LEFT VENTRICULAR HYPERTROPHY WITH REPOLARIZATION ABNORMALITY  INFERIOR INFARCT , AGE UNDETERMINED  ABNORMAL ECG    < end of copied text >    	  RADIOLOGY:  OTHER:     DIAGNOSTIC TESTING:  [ ] Echocardiogram: < from: TTE with Doppler (w/Cont) (11.07.18 @ 05:53) >  Conclusions:  1. Tethered mitral valve leaflets with normal opening.  Mitral annular calcification and thickened  mitral leaflet  tips Moderate mitral regurgitation.  2. Calcified trileaflet aortic valve with normal opening.  3. Moderately dilated left atrium.  LA volume index = 43  cc/m2.  4. Eccentric left ventricular hypertrophy (dilated left  ventricle with normal relative wall thickness).  5. Severe global left ventricularsystolic dysfunction.  Endocardial visualization enhanced with intravenous  injection of Ultrasonic Enhancing Agent (Definity). No LV  thrombus.  Septal flattening consistent with right  ventricular overload.  6. Severe diastolic dysfunction with elevated filling  pressures  7. Severe right atrial enlargement.  8. Right ventricular enlargement with decreased right  ventricular systolic function.  A device wire is noted in  the right heart.  9. Dilated tricuspid annulus with malcoaptation of  tricuspid leaflets. Severe tricuspid regurgitation.  *** No previous Echo exam.    < end of copied text >    [ ]  Catheterization:  [ ] Stress Test:    	         ASSESSMENT/PLAN:  62 yo F with history of severe NICM s/p ICD on home milrinone, AF on ac with eliquis, HTN, DM, thyroid CA s/p thyroidectomy who is being seen for heart failure and cardiomyopathy.    Cont to hold A/C and antiplts meds  at present , would need to restart once cleared by Neuro sx    GI / DVT prophylaxis,  keep K>4, mag >2.0   keep net neg with Bumex   cont inotropic support with Primacor , for depressed EF   Neurosx following for SDH   ABX per ICU , follow up on  Cultures   D/W Dr Grimes pt seen and examined, no complaints, ROS - .       benzocaine 15 mG/menthol 3.6 mG Lozenge 1 Lozenge Oral every 4 hours PRN  buMETAnide 2 milliGRAM(s) Oral daily  cefTRIAXone   IVPB      cefTRIAXone   IVPB 2 Gram(s) IV Intermittent every 24 hours  chlorhexidine 4% Liquid 1 Application(s) Topical <User Schedule>  dextrose 40% Gel 15 Gram(s) Oral once PRN  dextrose 5%. 1000 milliLiter(s) IV Continuous <Continuous>  dextrose 50% Injectable 12.5 Gram(s) IV Push once  dextrose 50% Injectable 25 Gram(s) IV Push once  dextrose 50% Injectable 25 Gram(s) IV Push once  gabapentin 100 milliGRAM(s) Oral every 12 hours  glucagon  Injectable 1 milliGRAM(s) IntraMuscular once PRN  insulin lispro (HumaLOG) corrective regimen sliding scale   SubCutaneous three times a day before meals  insulin lispro (HumaLOG) corrective regimen sliding scale   SubCutaneous at bedtime  milrinone Infusion 0.5 MICROgram(s)/kG/Min IV Continuous <Continuous>  rifaximin 550 milliGRAM(s) Oral two times a day                            8.8    7.25  )-----------( 223      ( 24 Mar 2019 08:58 )             27.1       Hemoglobin: 8.8 g/dL (03-24 @ 08:58)  Hemoglobin: 9.4 g/dL (03-23 @ 19:28)  Hemoglobin: 9.1 g/dL (03-23 @ 09:49)  Hemoglobin: 9.7 g/dL (03-22 @ 00:39)  Hemoglobin: 10.3 g/dL (03-21 @ 17:33)      03-24    131<L>  |  89<L>  |  12  ----------------------------<  185<H>  3.4<L>   |  25  |  0.76    Ca    6.6<L>      24 Mar 2019 05:52  Mg     1.4     03-24    TPro  7.0  /  Alb  2.8<L>  /  TBili  3.7<H>  /  DBili  x   /  AST  43<H>  /  ALT  29  /  AlkPhos  374<H>  03-23    Creatinine Trend: 0.76<--, 0.74<--, 0.85<--, 0.82<--, 0.90<--, 1.00<--    COAGS:           T(C): 37.1 (03-24-19 @ 23:52), Max: 37.1 (03-24-19 @ 23:52)  HR: 115 (03-24-19 @ 23:52) (105 - 115)  BP: 91/65 (03-24-19 @ 23:52) (91/65 - 107/75)  RR: 16 (03-24-19 @ 23:52) (16 - 17)  SpO2: 97% (03-24-19 @ 23:52) (92% - 97%)  Wt(kg): --    I&O's Summary    24 Mar 2019 07:01  -  25 Mar 2019 07:00  --------------------------------------------------------  IN: 840 mL / OUT: 2300 mL / NET: -1460 mL        	  Lymphatic: No lymphadenopathy trace edema in LE bilaterally   Cardiovascular: Normal S1 S2, + JVD, No murmurs , Peripheral pulses palpable 2+ bilaterally  Respiratory: Lungs clear to auscultation  Gastrointestinal:  Soft, Non-tender, + BS	  Skin: No rashes, No ecchymoses, No cyanosis, warm to touch  Psychiatry:  Mood & affect appropriate      TELEMETRY: Afib       ECG: < from: 12 Lead ECG (03.20.19 @ 05:58) >  WIDE QRS TACHYCARDIA  LEFT AXIS DEVIATION  RIGHT BUNDLE BRANCH BLOCK  LEFT VENTRICULAR HYPERTROPHY WITH REPOLARIZATION ABNORMALITY  INFERIOR INFARCT , AGE UNDETERMINED  ABNORMAL ECG    < end of copied text >    	  RADIOLOGY:  OTHER:     DIAGNOSTIC TESTING:  [ ] Echocardiogram: < from: TTE with Doppler (w/Cont) (11.07.18 @ 05:53) >  Conclusions:  1. Tethered mitral valve leaflets with normal opening.  Mitral annular calcification and thickened  mitral leaflet  tips Moderate mitral regurgitation.  2. Calcified trileaflet aortic valve with normal opening.  3. Moderately dilated left atrium.  LA volume index = 43  cc/m2.  4. Eccentric left ventricular hypertrophy (dilated left  ventricle with normal relative wall thickness).  5. Severe global left ventricularsystolic dysfunction.  Endocardial visualization enhanced with intravenous  injection of Ultrasonic Enhancing Agent (Definity). No LV  thrombus.  Septal flattening consistent with right  ventricular overload.  6. Severe diastolic dysfunction with elevated filling  pressures  7. Severe right atrial enlargement.  8. Right ventricular enlargement with decreased right  ventricular systolic function.  A device wire is noted in  the right heart.  9. Dilated tricuspid annulus with malcoaptation of  tricuspid leaflets. Severe tricuspid regurgitation.  *** No previous Echo exam.    < end of copied text >    [ ]  Catheterization:  [ ] Stress Test:    	         ASSESSMENT/PLAN:  64 yo F with history of severe NICM s/p ICD on home milrinone, AF on ac with eliquis, HTN, DM, thyroid CA s/p thyroidectomy who is being seen for heart failure and cardiomyopathy.    Cont to hold A/C and antiplts meds  at present , would need to restart once cleared by Neuro sx    GI / DVT prophylaxis,  keep K>4, mag >2.0   keep net neg with Bumex   cont inotropic support with Primacor , for depressed EF   Neurosx following for SDH    follow up on  Cultures   D/W Dr Grimes

## 2019-03-26 ENCOUNTER — APPOINTMENT (OUTPATIENT)
Dept: CARDIOLOGY | Facility: CLINIC | Age: 64
End: 2019-03-26

## 2019-03-26 DIAGNOSIS — S06.5X9A TRAUMATIC SUBDURAL HEMORRHAGE WITH LOSS OF CONSCIOUSNESS OF UNSPECIFIED DURATION, INITIAL ENCOUNTER: ICD-10-CM

## 2019-03-26 DIAGNOSIS — I48.0 PAROXYSMAL ATRIAL FIBRILLATION: ICD-10-CM

## 2019-03-26 DIAGNOSIS — R78.81 BACTEREMIA: ICD-10-CM

## 2019-03-26 DIAGNOSIS — I50.23 ACUTE ON CHRONIC SYSTOLIC (CONGESTIVE) HEART FAILURE: ICD-10-CM

## 2019-03-26 LAB
ANION GAP SERPL CALC-SCNC: 18 MMOL/L — HIGH (ref 5–17)
BUN SERPL-MCNC: 18 MG/DL — SIGNIFICANT CHANGE UP (ref 7–23)
CALCIUM SERPL-MCNC: 6 MG/DL — CRITICAL LOW (ref 8.4–10.5)
CHLORIDE SERPL-SCNC: 84 MMOL/L — LOW (ref 96–108)
CO2 SERPL-SCNC: 22 MMOL/L — SIGNIFICANT CHANGE UP (ref 22–31)
CREAT SERPL-MCNC: 0.83 MG/DL — SIGNIFICANT CHANGE UP (ref 0.5–1.3)
GAS PNL BLDV: SIGNIFICANT CHANGE UP
GLUCOSE BLDC GLUCOMTR-MCNC: 142 MG/DL — HIGH (ref 70–99)
GLUCOSE BLDC GLUCOMTR-MCNC: 198 MG/DL — HIGH (ref 70–99)
GLUCOSE BLDC GLUCOMTR-MCNC: 235 MG/DL — HIGH (ref 70–99)
GLUCOSE BLDC GLUCOMTR-MCNC: 94 MG/DL — SIGNIFICANT CHANGE UP (ref 70–99)
GLUCOSE SERPL-MCNC: 241 MG/DL — HIGH (ref 70–99)
HCT VFR BLD CALC: 26.7 % — LOW (ref 34.5–45)
HGB BLD-MCNC: 8.8 G/DL — LOW (ref 11.5–15.5)
LACTATE SERPL-SCNC: 3.5 MMOL/L — HIGH (ref 0.7–2)
MAGNESIUM SERPL-MCNC: 2 MG/DL — SIGNIFICANT CHANGE UP (ref 1.6–2.6)
MCHC RBC-ENTMCNC: 24.4 PG — LOW (ref 27–34)
MCHC RBC-ENTMCNC: 33 GM/DL — SIGNIFICANT CHANGE UP (ref 32–36)
MCV RBC AUTO: 74 FL — LOW (ref 80–100)
PLATELET # BLD AUTO: 267 K/UL — SIGNIFICANT CHANGE UP (ref 150–400)
POTASSIUM SERPL-MCNC: 4 MMOL/L — SIGNIFICANT CHANGE UP (ref 3.5–5.3)
POTASSIUM SERPL-SCNC: 4 MMOL/L — SIGNIFICANT CHANGE UP (ref 3.5–5.3)
RBC # BLD: 3.61 M/UL — LOW (ref 3.8–5.2)
RBC # FLD: 23.5 % — HIGH (ref 10.3–14.5)
SODIUM SERPL-SCNC: 124 MMOL/L — LOW (ref 135–145)
WBC # BLD: 8.18 K/UL — SIGNIFICANT CHANGE UP (ref 3.8–10.5)
WBC # FLD AUTO: 8.18 K/UL — SIGNIFICANT CHANGE UP (ref 3.8–10.5)

## 2019-03-26 PROCEDURE — 99233 SBSQ HOSP IP/OBS HIGH 50: CPT

## 2019-03-26 PROCEDURE — 99232 SBSQ HOSP IP/OBS MODERATE 35: CPT

## 2019-03-26 RX ORDER — LEVOTHYROXINE SODIUM 125 MCG
175 TABLET ORAL DAILY
Qty: 0 | Refills: 0 | Status: DISCONTINUED | OUTPATIENT
Start: 2019-03-26 | End: 2019-03-28

## 2019-03-26 RX ORDER — SPIRONOLACTONE 25 MG/1
25 TABLET, FILM COATED ORAL DAILY
Qty: 0 | Refills: 0 | Status: DISCONTINUED | OUTPATIENT
Start: 2019-03-26 | End: 2019-03-28

## 2019-03-26 RX ORDER — MAGNESIUM SULFATE 500 MG/ML
1 VIAL (ML) INJECTION ONCE
Qty: 0 | Refills: 0 | Status: COMPLETED | OUTPATIENT
Start: 2019-03-26 | End: 2019-03-26

## 2019-03-26 RX ORDER — FUROSEMIDE 40 MG
40 TABLET ORAL ONCE
Qty: 0 | Refills: 0 | Status: COMPLETED | OUTPATIENT
Start: 2019-03-26 | End: 2019-03-26

## 2019-03-26 RX ORDER — MILRINONE LACTATE 1 MG/ML
0.25 INJECTION, SOLUTION INTRAVENOUS
Qty: 20 | Refills: 0 | Status: DISCONTINUED | OUTPATIENT
Start: 2019-03-26 | End: 2019-03-27

## 2019-03-26 RX ADMIN — MILRINONE LACTATE 5.99 MICROGRAM(S)/KG/MIN: 1 INJECTION, SOLUTION INTRAVENOUS at 18:01

## 2019-03-26 RX ADMIN — CALCITRIOL 0.5 MICROGRAM(S): 0.5 CAPSULE ORAL at 12:38

## 2019-03-26 RX ADMIN — Medication 40 MILLIGRAM(S): at 18:01

## 2019-03-26 RX ADMIN — GABAPENTIN 100 MILLIGRAM(S): 400 CAPSULE ORAL at 06:07

## 2019-03-26 RX ADMIN — MAGNESIUM OXIDE 400 MG ORAL TABLET 400 MILLIGRAM(S): 241.3 TABLET ORAL at 18:01

## 2019-03-26 RX ADMIN — CHLORHEXIDINE GLUCONATE 1 APPLICATION(S): 213 SOLUTION TOPICAL at 07:29

## 2019-03-26 RX ADMIN — MAGNESIUM OXIDE 400 MG ORAL TABLET 400 MILLIGRAM(S): 241.3 TABLET ORAL at 12:38

## 2019-03-26 RX ADMIN — Medication 2: at 12:27

## 2019-03-26 RX ADMIN — Medication 1 TABLET(S): at 18:02

## 2019-03-26 RX ADMIN — BUMETANIDE 2 MILLIGRAM(S): 0.25 INJECTION INTRAMUSCULAR; INTRAVENOUS at 06:07

## 2019-03-26 RX ADMIN — Medication 4: at 07:58

## 2019-03-26 RX ADMIN — CEFTRIAXONE 100 GRAM(S): 500 INJECTION, POWDER, FOR SOLUTION INTRAMUSCULAR; INTRAVENOUS at 12:38

## 2019-03-26 RX ADMIN — GABAPENTIN 100 MILLIGRAM(S): 400 CAPSULE ORAL at 18:01

## 2019-03-26 RX ADMIN — MILRINONE LACTATE 11.98 MICROGRAM(S)/KG/MIN: 1 INJECTION, SOLUTION INTRAVENOUS at 01:30

## 2019-03-26 RX ADMIN — MAGNESIUM OXIDE 400 MG ORAL TABLET 400 MILLIGRAM(S): 241.3 TABLET ORAL at 07:58

## 2019-03-26 RX ADMIN — Medication 100 GRAM(S): at 01:30

## 2019-03-26 RX ADMIN — Medication 1 TABLET(S): at 06:07

## 2019-03-26 RX ADMIN — MILRINONE LACTATE 11.98 MICROGRAM(S)/KG/MIN: 1 INJECTION, SOLUTION INTRAVENOUS at 12:45

## 2019-03-26 NOTE — PROGRESS NOTE ADULT - PROBLEM SELECTOR PLAN 1
- continue Milrinone 0.5 mcg/ kg/ min  - recheck VBG from [facility] to reassess central venous sat. Please send repeat lactate.  - Recommend removal of central line.  - Continue with bumex 2mg daily  - Recommend resuming marcel 25mg daily  - standing weights daily   - will have to continue discussing with patient importance of inotropes as pt would like to have a "break" from it from fear of infection from PICC line again  - Please consult palliative regarding GOC - continue Milrinone 0.5 mcg/ kg/ min  - recheck VBG from [facility] to reassess central venous sat. Please send repeat lactate.  - Recommend removal of central line.  - Recommend increasing bumex to 2mg BID dosed at 0600 a1400  - Recommend resuming marcel 25mg daily  - standing weights daily   - will have to continue discussing with patient importance of inotropes as pt would like to have a "break" from it from fear of infection from PICC line again  - Please consult palliative regarding GOC - Will wean milrinone as she is refusing home infusion. Please decrease Milrinone to 0.25 mcg/ kg/ min  - Recommend removal of central line.  - Please change diuretics to lasix 40mg IV x 1 now. Will assess response to determine ongoing dosing.  - Please start marcel 25mg daily  - standing weights daily   - Please consult palliative regarding GOC - Will wean milrinone as she is refusing home infusion. Please decrease Milrinone to 0.25 mcg/ kg/ min  - Recommend removal of central line.  - Please change diuretics to lasix 40mg IV x 1 now. Will assess response to determine ongoing dosing.  - Please start marcel 25mg daily  - standing weights daily   - Please consult palliative regarding GOC as she has no other options

## 2019-03-26 NOTE — PROGRESS NOTE ADULT - SUBJECTIVE AND OBJECTIVE BOX
NEPHROLOGY-[facility] [phone]        Patient seen and examined in bed.  She was in good spirits and offered no complaints   On Milrinone at present         MEDICATIONS  (STANDING):  buMETAnide 2 milliGRAM(s) Oral daily  calcitriol   Capsule 0.5 MICROGram(s) Oral daily  calcium carbonate 1250 mG  + Vitamin D (OsCal 500 + D) 1 Tablet(s) Oral two times a day  cefTRIAXone   IVPB      cefTRIAXone   IVPB 2 Gram(s) IV Intermittent every 24 hours  chlorhexidine 4% Liquid 1 Application(s) Topical <User Schedule>  dextrose 5%. 1000 milliLiter(s) (50 mL/Hr) IV Continuous <Continuous>  dextrose 50% Injectable 12.5 Gram(s) IV Push once  dextrose 50% Injectable 25 Gram(s) IV Push once  dextrose 50% Injectable 25 Gram(s) IV Push once  gabapentin 100 milliGRAM(s) Oral every 12 hours  insulin lispro (HumaLOG) corrective regimen sliding scale   SubCutaneous three times a day before meals  insulin lispro (HumaLOG) corrective regimen sliding scale   SubCutaneous at bedtime  magnesium oxide 400 milliGRAM(s) Oral three times a day with meals  milrinone Infusion 0.5 MICROgram(s)/kG/Min (11.985 mL/Hr) IV Continuous <Continuous>  rifaximin 550 milliGRAM(s) Oral two times a day      VITAL:  T(C): , Max: 37.2 (03-25-19 @ 20:02)  T(F): , Max: 98.9 (03-25-19 @ 20:02)  HR: 113 (03-26-19 @ 08:31)  BP: 94/65 (03-26-19 @ 08:31)  BP(mean): --  RR: 18 (03-26-19 @ 08:31)  SpO2: 100% (03-26-19 @ 08:31)  Wt(kg): --    I and O's:    03-25 @ 07:01  -  03-26 @ 07:00  --------------------------------------------------------  IN: 1194 mL / OUT: 1900 mL / NET: -706 mL          PHYSICAL EXAM:    Constitutional: NAD  Neck:  No JVD  Respiratory: CTAB/L  Cardiovascular: S1 and S2  Gastrointestinal: BS+, soft, NT/ND  Extremities: No peripheral edema  Neurological: A/O x 3, no focal deficits  Psychiatric: Normal mood, normal affect  : No Gustafson  Skin: No rashes  Access: Not applicable    LABS:                        8.8    8.18  )-----------( 267      ( 26 Mar 2019 09:13 )             26.7     03-26    124<L>  |  84<L>  |  18  ----------------------------<  241<H>  4.0   |  22  |  0.83    Ca    6.0<LL>      26 Mar 2019 06:16  Phos  4.2     03-25  Mg     2.0     03-26    TPro  7.2  /  Alb  3.1<L>  /  TBili  3.2<H>  /  DBili  x   /  AST  46<H>  /  ALT  29  /  AlkPhos  382<H>  03-25          Urine Studies:          RADIOLOGY & ADDITIONAL STUDIES:

## 2019-03-26 NOTE — PROGRESS NOTE ADULT - SUBJECTIVE AND OBJECTIVE BOX
Subjective:  - Fatigued this morning. Did not sleep well overnight  - States she has been walking in the hallways without EDDY  - Denies orthopnea, PND, CP, palpitations, lightheadedness, dizziness, headache, fevers, chills, abdominal distention    Medications:  benzocaine 15 mG/menthol 3.6 mG Lozenge 1 Lozenge Oral every 4 hours PRN  buMETAnide 2 milliGRAM(s) Oral daily  calcitriol   Capsule 0.5 MICROGram(s) Oral daily  calcium carbonate 1250 mG  + Vitamin D (OsCal 500 + D) 1 Tablet(s) Oral two times a day  cefTRIAXone   IVPB      cefTRIAXone   IVPB 2 Gram(s) IV Intermittent every 24 hours  chlorhexidine 4% Liquid 1 Application(s) Topical <User Schedule>  dextrose 40% Gel 15 Gram(s) Oral once PRN  dextrose 5%. 1000 milliLiter(s) IV Continuous <Continuous>  dextrose 50% Injectable 12.5 Gram(s) IV Push once  dextrose 50% Injectable 25 Gram(s) IV Push once  dextrose 50% Injectable 25 Gram(s) IV Push once  gabapentin 100 milliGRAM(s) Oral every 12 hours  glucagon  Injectable 1 milliGRAM(s) IntraMuscular once PRN  insulin lispro (HumaLOG) corrective regimen sliding scale   SubCutaneous three times a day before meals  insulin lispro (HumaLOG) corrective regimen sliding scale   SubCutaneous at bedtime  levothyroxine 175 MICROGram(s) Oral daily  magnesium oxide 400 milliGRAM(s) Oral three times a day with meals  milrinone Infusion 0.5 MICROgram(s)/kG/Min IV Continuous <Continuous>  rifaximin 550 milliGRAM(s) Oral two times a day      Physical Exam:    Vitals:  Vital Signs Last 24 Hours  T(C): 37.1 (19 @ 08:31), Max: 37.2 (19 @ 20:02)  HR: 113 (19 @ 08:31) (102 - 113)  BP: 94/65 (19 @ 08:31) (90/64 - 103/73)  RR: 18 (19 @ 08:31) (16 - 18)  SpO2: 100% (19 @ 08:31) (99% - 100%)    Weight in k.1 ( @ 08:34)    I&O's Summary    25 Mar 2019 07:  -  26 Mar 2019 07:00  --------------------------------------------------------  IN: 1194 mL / OUT: 1900 mL / NET: -706 mL    26 Mar 2019 07:  -  26 Mar 2019 10:33  --------------------------------------------------------  IN: 240 mL / OUT: 0 mL / NET: 240 mL    Tele: SR  HR     General: No distress. Comfortable.  HEENT: EOM intact.  Neck: Neck supple. JVP ~8 cm H2O on L, unable assess on R due to RIJ CVC. EJ distended No masses  Chest: Clear to auscultation bilaterally  CV: RRR, Normal S1 and S2. III/VI SM. +2 radial pulses. Trace BLE edema  Abdomen: Soft, non-distended, non-tender, normoactive BS  Skin: No rashes or skin breakdown. RIJ CVC in place. Dressing C/D/I  Neurology: Alert and oriented times three. Sensation intact  Psych: Affect normal    Labs:                        8.8    8.18  )-----------( 267      ( 26 Mar 2019 09:13 )             26.7         124<L>  |  84<L>  |  18  ----------------------------<  241<H>  4.0   |  22  |  0.83    Ca    6.0<LL>      26 Mar 2019 06:16  Phos  4.2       Mg     2.0         TPro  7.2  /  Alb  3.1<L>  /  TBili  3.2<H>  /  DBili  x   /  AST  46<H>  /  ALT  29  /  AlkPhos  382<H>      Lactate, Blood: 2.1 mmol/L ( @ 19:49)  Lactate, Blood: 1.6 mmol/L ( @ 15:52) Subjective:  - Fatigued this morning. Did not sleep well overnight  - States she has been walking in the hallways without EDDY  - Denies orthopnea, PND, CP, palpitations, lightheadedness, dizziness, headache, fevers, chills, abdominal distention    Medications:  benzocaine 15 mG/menthol 3.6 mG Lozenge 1 Lozenge Oral every 4 hours PRN  buMETAnide 2 milliGRAM(s) Oral daily  calcitriol   Capsule 0.5 MICROGram(s) Oral daily  calcium carbonate 1250 mG  + Vitamin D (OsCal 500 + D) 1 Tablet(s) Oral two times a day  cefTRIAXone   IVPB      cefTRIAXone   IVPB 2 Gram(s) IV Intermittent every 24 hours  chlorhexidine 4% Liquid 1 Application(s) Topical <User Schedule>  dextrose 40% Gel 15 Gram(s) Oral once PRN  dextrose 5%. 1000 milliLiter(s) IV Continuous <Continuous>  dextrose 50% Injectable 12.5 Gram(s) IV Push once  dextrose 50% Injectable 25 Gram(s) IV Push once  dextrose 50% Injectable 25 Gram(s) IV Push once  gabapentin 100 milliGRAM(s) Oral every 12 hours  glucagon  Injectable 1 milliGRAM(s) IntraMuscular once PRN  insulin lispro (HumaLOG) corrective regimen sliding scale   SubCutaneous three times a day before meals  insulin lispro (HumaLOG) corrective regimen sliding scale   SubCutaneous at bedtime  levothyroxine 175 MICROGram(s) Oral daily  magnesium oxide 400 milliGRAM(s) Oral three times a day with meals  milrinone Infusion 0.5 MICROgram(s)/kG/Min IV Continuous <Continuous>  rifaximin 550 milliGRAM(s) Oral two times a day      Physical Exam:    Vitals:  Vital Signs Last 24 Hours  T(C): 37.1 (19 @ 08:31), Max: 37.2 (19 @ 20:02)  HR: 113 (19 @ 08:31) (102 - 113)  BP: 94/65 (19 @ 08:31) (90/64 - 103/73)  RR: 18 (19 @ 08:31) (16 - 18)  SpO2: 100% (19 @ 08:31) (99% - 100%)    Weight in k.1 ( @ 08:34)    I&O's Summary    25 Mar 2019 07:  -  26 Mar 2019 07:00  --------------------------------------------------------  IN: 1194 mL / OUT: 1900 mL / NET: -706 mL    26 Mar 2019 07:  -  26 Mar 2019 10:33  --------------------------------------------------------  IN: 240 mL / OUT: 0 mL / NET: 240 mL    Tele: SR  HR     General: No distress. Comfortable.  HEENT: EOM intact.  Neck: Neck supple. JVP ~10 cm H2O on L, unable assess on R due to RIJ CVC. EJ distended No masses  Chest: Clear to auscultation bilaterally  CV: RRR, Normal S1 and S2. III/VI SM. +2 radial pulses. Trace BLE edema  Abdomen: Soft, non-distended, non-tender, normoactive BS  Skin: No rashes or skin breakdown. RIJ CVC in place. Dressing C/D/I  Neurology: Alert and oriented times three. Sensation intact  Psych: Affect normal    Labs:                        8.8    8.18  )-----------( 267      ( 26 Mar 2019 09:13 )             26.7         124<L>  |  84<L>  |  18  ----------------------------<  241<H>  4.0   |  22  |  0.83    Ca    6.0<LL>      26 Mar 2019 06:16  Phos  4.2       Mg     2.0         TPro  7.2  /  Alb  3.1<L>  /  TBili  3.2<H>  /  DBili  x   /  AST  46<H>  /  ALT  29  /  AlkPhos  382<H>      Lactate, Blood: 2.1 mmol/L ( @ 19:49)  Lactate, Blood: 1.6 mmol/L ( @ 15:52) Subjective:  - Fatigued this morning. Did not sleep well overnight  - States she has been walking in the hallways without EDDY  - Denies orthopnea, PND, CP, palpitations, lightheadedness, dizziness, headache, fevers, chills, abdominal distention    Medications:  benzocaine 15 mG/menthol 3.6 mG Lozenge 1 Lozenge Oral every 4 hours PRN  buMETAnide 2 milliGRAM(s) Oral daily  calcitriol   Capsule 0.5 MICROGram(s) Oral daily  calcium carbonate 1250 mG  + Vitamin D (OsCal 500 + D) 1 Tablet(s) Oral two times a day  cefTRIAXone   IVPB      cefTRIAXone   IVPB 2 Gram(s) IV Intermittent every 24 hours  chlorhexidine 4% Liquid 1 Application(s) Topical <User Schedule>  dextrose 40% Gel 15 Gram(s) Oral once PRN  dextrose 5%. 1000 milliLiter(s) IV Continuous <Continuous>  dextrose 50% Injectable 12.5 Gram(s) IV Push once  dextrose 50% Injectable 25 Gram(s) IV Push once  dextrose 50% Injectable 25 Gram(s) IV Push once  gabapentin 100 milliGRAM(s) Oral every 12 hours  glucagon  Injectable 1 milliGRAM(s) IntraMuscular once PRN  insulin lispro (HumaLOG) corrective regimen sliding scale   SubCutaneous three times a day before meals  insulin lispro (HumaLOG) corrective regimen sliding scale   SubCutaneous at bedtime  levothyroxine 175 MICROGram(s) Oral daily  magnesium oxide 400 milliGRAM(s) Oral three times a day with meals  milrinone Infusion 0.5 MICROgram(s)/kG/Min IV Continuous <Continuous>  rifaximin 550 milliGRAM(s) Oral two times a day      Physical Exam:    Vitals:  Vital Signs Last 24 Hours  T(C): 37.1 (19 @ 08:31), Max: 37.2 (19 @ 20:02)  HR: 113 (19 @ 08:31) (102 - 113)  BP: 94/65 (19 @ 08:31) (90/64 - 103/73)  RR: 18 (19 @ 08:31) (16 - 18)  SpO2: 100% (19 @ 08:31) (99% - 100%)    Weight in k.1 ( @ 08:34)    I&O's Summary    25 Mar 2019 07:  -  26 Mar 2019 07:00  --------------------------------------------------------  IN: 1194 mL / OUT: 1900 mL / NET: -706 mL    26 Mar 2019 07:  -  26 Mar 2019 10:33  --------------------------------------------------------  IN: 240 mL / OUT: 0 mL / NET: 240 mL    Tele: SR  HR     General: No distress. Comfortable.  HEENT: EOM intact.  Neck: Neck supple. JVP ~18 cm H2O on L, unable assess on R due to RIJ CVC. EJ distended No masses  Chest: Clear to auscultation bilaterally  CV: RRR, Normal S1 and S2. III/VI SM. +2 radial pulses. +1 BLE, tight  Abdomen: Soft, non-distended, non-tender, normoactive BS  Skin: No rashes or skin breakdown. RIJ CVC in place. Dressing C/D/I  Neurology: Lethargic and oriented times three. Sensation intact  Psych: Affect normal    Labs:                        8.8    8.18  )-----------( 267      ( 26 Mar 2019 09:13 )             26.7         124<L>  |  84<L>  |  18  ----------------------------<  241<H>  4.0   |  22  |  0.83    Ca    6.0<LL>      26 Mar 2019 06:16  Phos  4.2       Mg     2.0         TPro  7.2  /  Alb  3.1<L>  /  TBili  3.2<H>  /  DBili  x   /  AST  46<H>  /  ALT  29  /  AlkPhos  382<H>      Lactate, Blood: 2.1 mmol/L ( @ 19:49)  Lactate, Blood: 1.6 mmol/L ( @ 15:52)    Blood Gas Venous - Lactate (19 @ 13:16)    Blood Gas Venous - Lactate: 3.7 mmoL/L    Blood Gas Profile - Venous (19 @ 13:16)    pH, Venous: 7.43    pCO2, Venous: 39 mmHg    pO2, Venous: 24 mmHg    HCO3, Venous: 25 mmol/L    Base Excess, Venous: 1.5 mmol/L    Oxygen Saturation, Venous: 38 %    Total CO2, Venous: 26 mmol/L

## 2019-03-26 NOTE — PROGRESS NOTE ADULT - ASSESSMENT
62 yo F h/o severe cardiomyopathy c/b CHF w EF 10% s/p AICD on home milrinone infusion via PICC, afib on aspirin & elliquis, DM2, HTN, thyroid cancer s/p thyroidectomy presents for mechanical fall, found to have SDH, admitted to MICU for multifactorial shock likely cardiogenic and septic shock i/s/o possible PNA.    Sepsis  with Bacteremia K Pneumonia  : - Present on admission. ID helping.   - IV Rocephin till tomorrow and can be changed to PO Levaquin per ID.    Mechanical Fall with SDH   - SDH stable on repeat CT head.   - appreciate neurosurgery recs    Severe cardiomyopathy c/b CHF s/p AICD on home milrinone infusion via PICC with Shock ; Multifactorial shock w cardiogenic & septic shock i/s/o PNA. Management per  Cardiology, HF Team and EP.   - amiodarone 150 mg stat  - weaning off milrinone gtt but risks including worsening CHF and possible death explained to patient .   - TTE    Anemia   : -Chronic.  - Daily CBC    Hypocalcemia ,Hypokalemia & hypomagnesemia. Cr stable. Renal helping.   - monitor BMP, replete for K > 4, Mg > 2    Congestive hepatopathy : Sec to CHF .Abd mildly distended, non-tender. Cholelithiasis on prior US. Lipase 140. Ammonia wnl.   - Stable.     DM2; Sugars in good range.   - ISS q6h    Thyroid cancer s/p thyroidectomy  - on synthroid     - DVT ppx: SCDs, hold pharmacological ppx  - tx SDH as above

## 2019-03-26 NOTE — PROGRESS NOTE ADULT - SUBJECTIVE AND OBJECTIVE BOX
S: no chest pain      benzocaine 15 mG/menthol 3.6 mG Lozenge 1 Lozenge Oral every 4 hours PRN  buMETAnide 2 milliGRAM(s) Oral daily  calcitriol   Capsule 0.5 MICROGram(s) Oral daily  calcium carbonate 1250 mG  + Vitamin D (OsCal 500 + D) 1 Tablet(s) Oral two times a day  cefTRIAXone   IVPB      cefTRIAXone   IVPB 2 Gram(s) IV Intermittent every 24 hours  chlorhexidine 4% Liquid 1 Application(s) Topical <User Schedule>  dextrose 40% Gel 15 Gram(s) Oral once PRN  dextrose 5%. 1000 milliLiter(s) IV Continuous <Continuous>  dextrose 50% Injectable 12.5 Gram(s) IV Push once  dextrose 50% Injectable 25 Gram(s) IV Push once  dextrose 50% Injectable 25 Gram(s) IV Push once  gabapentin 100 milliGRAM(s) Oral every 12 hours  glucagon  Injectable 1 milliGRAM(s) IntraMuscular once PRN  insulin lispro (HumaLOG) corrective regimen sliding scale   SubCutaneous three times a day before meals  insulin lispro (HumaLOG) corrective regimen sliding scale   SubCutaneous at bedtime  levothyroxine 175 MICROGram(s) Oral daily  magnesium oxide 400 milliGRAM(s) Oral three times a day with meals  milrinone Infusion 0.5 MICROgram(s)/kG/Min IV Continuous <Continuous>  rifaximin 550 milliGRAM(s) Oral two times a day                            8.8    8.18  )-----------( 267      ( 26 Mar 2019 09:13 )             26.7       03-26    124<L>  |  84<L>  |  18  ----------------------------<  241<H>  4.0   |  22  |  0.83    Ca    6.0<LL>      26 Mar 2019 06:16  Phos  4.2     03-25  Mg     2.0     03-26    TPro  7.2  /  Alb  3.1<L>  /  TBili  3.2<H>  /  DBili  x   /  AST  46<H>  /  ALT  29  /  AlkPhos  382<H>  03-25            T(C): 37.1 (03-26-19 @ 08:31), Max: 37.2 (03-25-19 @ 20:02)  HR: 113 (03-26-19 @ 08:31) (102 - 113)  BP: 94/65 (03-26-19 @ 08:31) (90/64 - 103/73)  RR: 18 (03-26-19 @ 08:31) (16 - 18)  SpO2: 100% (03-26-19 @ 08:31) (99% - 100%)  Wt(kg): --    I&O's Summary    25 Mar 2019 07:01  -  26 Mar 2019 07:00  --------------------------------------------------------  IN: 1194 mL / OUT: 1900 mL / NET: -706 mL    26 Mar 2019 07:01  -  26 Mar 2019 12:22  --------------------------------------------------------  IN: 240 mL / OUT: 0 mL / NET: 240 mL      	  Lymphatic: No lymphadenopathy trace edema in LE bilaterally   Cardiovascular: Normal S1 S2, + JVD, No murmurs , Peripheral pulses palpable 2+ bilaterally  Respiratory: Lungs clear to auscultation  Gastrointestinal:  Soft, Non-tender, + BS	  Skin: No rashes, No ecchymoses, No cyanosis, warm to touch  Psychiatry:  Mood & affect appropriate      TELEMETRY:       ECG: < from: 12 Lead ECG (03.20.19 @ 05:58) >  WIDE QRS TACHYCARDIA  LEFT AXIS DEVIATION  RIGHT BUNDLE BRANCH BLOCK  LEFT VENTRICULAR HYPERTROPHY WITH REPOLARIZATION ABNORMALITY  INFERIOR INFARCT , AGE UNDETERMINED  ABNORMAL ECG    < end of copied text >    	  RADIOLOGY:  OTHER:     DIAGNOSTIC TESTING:  [ ] Echocardiogram: < from: TTE with Doppler (w/Cont) (11.07.18 @ 05:53) >  Conclusions:  1. Tethered mitral valve leaflets with normal opening.  Mitral annular calcification and thickened  mitral leaflet  tips Moderate mitral regurgitation.  2. Calcified trileaflet aortic valve with normal opening.  3. Moderately dilated left atrium.  LA volume index = 43  cc/m2.  4. Eccentric left ventricular hypertrophy (dilated left  ventricle with normal relative wall thickness).  5. Severe global left ventricularsystolic dysfunction.  Endocardial visualization enhanced with intravenous  injection of Ultrasonic Enhancing Agent (Definity). No LV  thrombus.  Septal flattening consistent with right  ventricular overload.  6. Severe diastolic dysfunction with elevated filling  pressures  7. Severe right atrial enlargement.  8. Right ventricular enlargement with decreased right  ventricular systolic function.  A device wire is noted in  the right heart.  9. Dilated tricuspid annulus with malcoaptation of  tricuspid leaflets. Severe tricuspid regurgitation.  *** No previous Echo exam.    < end of copied text >    [ ]  Catheterization:  [ ] Stress Test:    	         ASSESSMENT/PLAN:  64 yo F with history of severe NICM s/p ICD on home milrinone, AF on ac with eliquis, HTN, DM, thyroid CA s/p thyroidectomy who is being seen for heart failure and cardiomyopathy.    ac and apt on hold due to SDH  follow up NSx to see if and when it would be safe to resume ac for afib  continue with milrinone if patient agreeable  follow up heart failure  continue with bumex  follow up ID    Kunal Muller MD

## 2019-03-26 NOTE — PROGRESS NOTE ADULT - PROBLEM SELECTOR PLAN 4
- Currently SR  - Off AC pending clearance from neurosurg in setting of SDH  - EP to consider outpatient LOLITA occlusion with Watchman if unable to resume Eliquis

## 2019-03-26 NOTE — PROGRESS NOTE ADULT - SUBJECTIVE AND OBJECTIVE BOX
Patient is a 63y old  Female who presents with a chief complaint of mechanical fall (26 Mar 2019 12:21)    Being followed by ID for        Interval history:  No other acute events      ROS:  No cough,SOB,CP  No N/V/D  No abd pain  No urinary complaints  No HA  No joint or limb pain  No other complaints    PAST MEDICAL & SURGICAL HISTORY:  Asthma  CHF (congestive heart failure): with EF of 10% s/p AICD  DM (diabetes mellitus)  HTN (hypertension)  Thyroid ca  AICD (automatic cardioverter/defibrillator) present  S/P thyroidectomy    Allergies    Isordil (Headache)  penicillin (Rash)    Intolerances      Antimicrobials:    cefTRIAXone   IVPB      cefTRIAXone   IVPB 2 Gram(s) IV Intermittent every 24 hours  rifaximin 550 milliGRAM(s) Oral two times a day    MEDICATIONS  (STANDING):  buMETAnide 2 milliGRAM(s) Oral daily  calcitriol   Capsule 0.5 MICROGram(s) Oral daily  calcium carbonate 1250 mG  + Vitamin D (OsCal 500 + D) 1 Tablet(s) Oral two times a day  cefTRIAXone   IVPB      cefTRIAXone   IVPB 2 Gram(s) IV Intermittent every 24 hours  chlorhexidine 4% Liquid 1 Application(s) Topical <User Schedule>  dextrose 5%. 1000 milliLiter(s) (50 mL/Hr) IV Continuous <Continuous>  dextrose 50% Injectable 12.5 Gram(s) IV Push once  dextrose 50% Injectable 25 Gram(s) IV Push once  dextrose 50% Injectable 25 Gram(s) IV Push once  gabapentin 100 milliGRAM(s) Oral every 12 hours  insulin lispro (HumaLOG) corrective regimen sliding scale   SubCutaneous three times a day before meals  insulin lispro (HumaLOG) corrective regimen sliding scale   SubCutaneous at bedtime  levothyroxine 175 MICROGram(s) Oral daily  magnesium oxide 400 milliGRAM(s) Oral three times a day with meals  milrinone Infusion 0.5 MICROgram(s)/kG/Min (11.985 mL/Hr) IV Continuous <Continuous>  rifaximin 550 milliGRAM(s) Oral two times a day      Vital Signs Last 24 Hrs  T(C): 37.1 (03-26-19 @ 13:02), Max: 37.2 (03-25-19 @ 20:02)  T(F): 98.7 (03-26-19 @ 13:02), Max: 98.9 (03-25-19 @ 20:02)  HR: 112 (03-26-19 @ 13:02) (102 - 113)  BP: 102/72 (03-26-19 @ 13:02) (90/64 - 103/73)  BP(mean): --  RR: 18 (03-26-19 @ 13:02) (16 - 18)  SpO2: 98% (03-26-19 @ 13:02) (98% - 100%)    Physical Exam:    Constitutional well preserved,comfortable,pleasant    HEENT PERRLA EOMI,No pallor or icterus    No oral exudate or erythema    Neck supple no JVD or LN    Chest Good AE,CTA    CVS RRR S1 S2 WNl No murmur or rub or gallop    Abd soft BS normal No tenderness no masses    Ext No cyanosis clubbing or edema    IV site no erythema tenderness or discharge    Joints no swelling or LOM    CNS AAO X 3 no focal    Lab Data:                          8.8    8.18  )-----------( 267      ( 26 Mar 2019 09:13 )             26.7       03-26    124<L>  |  84<L>  |  18  ----------------------------<  241<H>  4.0   |  22  |  0.83    Ca    6.0<LL>      26 Mar 2019 06:16  Phos  4.2     03-25  Mg     2.0     03-26    TPro  7.2  /  Alb  3.1<L>  /  TBili  3.2<H>  /  DBili  x   /  AST  46<H>  /  ALT  29  /  AlkPhos  382<H>  03-25          .Blood Blood  03-22-19   No growth to date.  --  --      .Blood Blood-Peripheral 1 AEROBIC BOTTLE REC'D  03-20-19   Growth in aerobic bottle: Klebsiella pneumoniae  "Due to technical problems, Proteus sp. will Not be reported as part of  the BCID panel until further notice"  ***Blood Panel PCR results on this specimen are available  approximately 3 hours after the Gram stain result.***  Gram stain, PCR, and/or culture results may not always  correspond due to difference in methodologies.  ************************************************************  This PCR assay was performed using [employer].  The following targets are tested for: Enterococcus,  vancomycin resistant enterococci, Listeria monocytogenes,  coagulase negative staphylococci, S. aureus,  methicillin resistant S. aureus, Streptococcus agalactiae  (Group B), S. pneumoniae, S. pyogenes (Group A),  Acinetobacter baumannii, Enterobacter cloacae, E. coli,  Klebsiella oxytoca, K. pneumoniae, Proteus sp.,  Serratia marcescens, Haemophilus influenzae,  Neisseria meningitidis, Pseudomonas aeruginosa, Candida  albicans, C. glabrata, C krusei, C parapsilosis,  C. tropicalis and the KPC resistance gene.  --  Blood Culture PCR  Klebsiella pneumoniae      .Blood PICC Tip  03-20-19   Growth in aerobic bottle: Klebsiella pneumoniae  See previous culture [id number]  --    Growth in aerobic bottle: Gram Negative Rods    WBC Count: 8.18 (03-26-19 @ 09:13)  WBC Count: 7.77 (03-25-19 @ 22:29)  WBC Count: 7.25 (03-24-19 @ 08:58)  WBC Count: 8.47 (03-23-19 @ 19:28)  WBC Count: 10.12 (03-23-19 @ 09:49)  WBC Count: 19.5 (03-22-19 @ 00:39)  WBC Count: 28.5 (03-21-19 @ 17:33)  WBC Count: 38.6 (03-21-19 @ 04:27)  WBC Count: 56.2 (03-20-19 @ 17:51)  WBC Count: 61.0 (03-20-19 @ 16:09)  WBC Count: 14.64 (03-20-19 @ 08:44)  WBC Count: 27.0 (03-20-19 @ 08:41)  WBC Count: 20.8 (03-19-19 @ 20:16) Patient is a 63y old  Female who presents with a chief complaint of mechanical fall (26 Mar 2019 12:21)    Being followed by ID for help in management        Interval history:  pt anxious for discharge  refuses PICC line  No other acute events        PAST MEDICAL & SURGICAL HISTORY:  Asthma  CHF (congestive heart failure): with EF of 10% s/p AICD  DM (diabetes mellitus)  HTN (hypertension)  Thyroid ca  AICD (automatic cardioverter/defibrillator) present  S/P thyroidectomy    Allergies    Isordil (Headache)  penicillin (Rash)    Intolerances      Antimicrobials:    cefTRIAXone   IVPB      cefTRIAXone   IVPB 2 Gram(s) IV Intermittent every 24 hours  rifaximin 550 milliGRAM(s) Oral two times a day    MEDICATIONS  (STANDING):  buMETAnide 2 milliGRAM(s) Oral daily  calcitriol   Capsule 0.5 MICROGram(s) Oral daily  calcium carbonate 1250 mG  + Vitamin D (OsCal 500 + D) 1 Tablet(s) Oral two times a day  cefTRIAXone   IVPB      cefTRIAXone   IVPB 2 Gram(s) IV Intermittent every 24 hours  chlorhexidine 4% Liquid 1 Application(s) Topical <User Schedule>  dextrose 5%. 1000 milliLiter(s) (50 mL/Hr) IV Continuous <Continuous>  dextrose 50% Injectable 12.5 Gram(s) IV Push once  dextrose 50% Injectable 25 Gram(s) IV Push once  dextrose 50% Injectable 25 Gram(s) IV Push once  gabapentin 100 milliGRAM(s) Oral every 12 hours  insulin lispro (HumaLOG) corrective regimen sliding scale   SubCutaneous three times a day before meals  insulin lispro (HumaLOG) corrective regimen sliding scale   SubCutaneous at bedtime  levothyroxine 175 MICROGram(s) Oral daily  magnesium oxide 400 milliGRAM(s) Oral three times a day with meals  milrinone Infusion 0.5 MICROgram(s)/kG/Min (11.985 mL/Hr) IV Continuous <Continuous>  rifaximin 550 milliGRAM(s) Oral two times a day      Vital Signs Last 24 Hrs  T(C): 37.1 (03-26-19 @ 13:02), Max: 37.2 (03-25-19 @ 20:02)  T(F): 98.7 (03-26-19 @ 13:02), Max: 98.9 (03-25-19 @ 20:02)  HR: 112 (03-26-19 @ 13:02) (102 - 113)  BP: 102/72 (03-26-19 @ 13:02) (90/64 - 103/73)  BP(mean): --  RR: 18 (03-26-19 @ 13:02) (16 - 18)  SpO2: 98% (03-26-19 @ 13:02) (98% - 100%)    Physical Exam:    Constitutional well preserved,comfortable,pleasant    HEENT PERRLA EOMI,No pallor or icterus    No oral exudate or erythema    Neck supple no JVD or LN    Chest Good AE,CTA    CVS RRR S1 S2 WNl     Abd soft BS normal No tenderness no masses    Ext No cyanosis clubbing or edema    IV site no erythema tenderness or discharge    Joints no swelling or LOM    CNS AAO X 3 no focal    Lab Data:                          8.8    8.18  )-----------( 267      ( 26 Mar 2019 09:13 )             26.7       03-26    124<L>  |  84<L>  |  18  ----------------------------<  241<H>  4.0   |  22  |  0.83    Ca    6.0<LL>      26 Mar 2019 06:16  Phos  4.2     03-25  Mg     2.0     03-26    TPro  7.2  /  Alb  3.1<L>  /  TBili  3.2<H>  /  DBili  x   /  AST  46<H>  /  ALT  29  /  AlkPhos  382<H>  03-25          .Blood Blood  03-22-19   No growth to date.  --  --      .Blood Blood-Peripheral 1 AEROBIC BOTTLE REC'D  03-20-19   Growth in aerobic bottle: Klebsiella pneumoniae  "Due to technical problems, Proteus sp. will Not be reported as part of  the BCID panel until further notice"  ***Blood Panel PCR results on this specimen are available  approximately 3 hours after the Gram stain result.***  Gram stain, PCR, and/or culture results may not always  correspond due to difference in methodologies.  ************************************************************  This PCR assay was performed using [employer].  The following targets are tested for: Enterococcus,  vancomycin resistant enterococci, Listeria monocytogenes,  coagulase negative staphylococci, S. aureus,  methicillin resistant S. aureus, Streptococcus agalactiae  (Group B), S. pneumoniae, S. pyogenes (Group A),  Acinetobacter baumannii, Enterobacter cloacae, E. coli,  Klebsiella oxytoca, K. pneumoniae, Proteus sp.,  Serratia marcescens, Haemophilus influenzae,  Neisseria meningitidis, Pseudomonas aeruginosa, Candida  albicans, C. glabrata, C krusei, C parapsilosis,  C. tropicalis and the KPC resistance gene.  --  Blood Culture PCR  Klebsiella pneumoniae      .Blood PICC Tip  03-20-19   Growth in aerobic bottle: Klebsiella pneumoniae  See previous culture [id number]  --    Growth in aerobic bottle: Gram Negative Rods    WBC Count: 8.18 (03-26-19 @ 09:13)  WBC Count: 7.77 (03-25-19 @ 22:29)  WBC Count: 7.25 (03-24-19 @ 08:58)  WBC Count: 8.47 (03-23-19 @ 19:28)  WBC Count: 10.12 (03-23-19 @ 09:49)  WBC Count: 19.5 (03-22-19 @ 00:39)  WBC Count: 28.5 (03-21-19 @ 17:33)  WBC Count: 38.6 (03-21-19 @ 04:27)  WBC Count: 56.2 (03-20-19 @ 17:51)  WBC Count: 61.0 (03-20-19 @ 16:09)  WBC Count: 14.64 (03-20-19 @ 08:44)  WBC Count: 27.0 (03-20-19 @ 08:41)  WBC Count: 20.8 (03-19-19 @ 20:16)

## 2019-03-26 NOTE — PROGRESS NOTE ADULT - PROBLEM SELECTOR PLAN 2
- On ceftriaxone. Day 6 of IV abx with plan for 10-14 days. May potentially switch to PO quinolones after day 7 per ID.  - Appreciate ID input.

## 2019-03-26 NOTE — PROGRESS NOTE ADULT - ATTENDING COMMENTS
Challenging patient that I know well from prior admissions and the HF clinic. She is devastated by the PICC line infection and the associated sequellae (ie, fall and SDH) and she is asking for a "break" from being on milrinone. The problem is that she is poorly perfused despite milrinone 0.5 mcg/kg/min and weaning/stopping will certainly lead to a decompensation. She insists, though, and it is her choice as myself and others have explained the likely consequences of ADHF and likely death.    Will reduce the milrinone to 0.25 mcg/kg/min now.   Please start lasix 40 mg IV x1 dose for volume overload and we will address additional dosing after assessing her response.  Add spironolactone 25 mg once daily.  Antibiotics per ID recs.  Palliative Care consult as she is going to decline and she is not a candidate for advanced HF therapies given her poor compliance and support.

## 2019-03-26 NOTE — PROGRESS NOTE ADULT - ATTENDING COMMENTS
EP ATTENDING    Agree with above. F/U with neurosurgery regarding if/when anticoagulation can be restarted for afib. If eliquis can't be used long term then next step would be outpatient left atrial appendage occlusion with Watchman. No further inpatient EP workup expected, and f/u with [facility] after discharge

## 2019-03-26 NOTE — PROGRESS NOTE ADULT - SUBJECTIVE AND OBJECTIVE BOX
INTERVAL HPI/OVERNIGHT EVENTS: I feel fine and want to go home. I do not want to have PICC line anymore as need rest for now.   Vital Signs Last 24 Hrs  T(C): 37.1 (26 Mar 2019 13:02), Max: 37.2 (25 Mar 2019 20:02)  T(F): 98.7 (26 Mar 2019 13:02), Max: 98.9 (25 Mar 2019 20:02)  HR: 112 (26 Mar 2019 13:02) (102 - 113)  BP: 102/72 (26 Mar 2019 13:02) (92/73 - 103/73)  BP(mean): --  RR: 18 (26 Mar 2019 13:02) (16 - 18)  SpO2: 98% (26 Mar 2019 13:02) (98% - 100%)  I&O's Summary    25 Mar 2019 07:01  -  26 Mar 2019 07:00  --------------------------------------------------------  IN: 1194 mL / OUT: 1900 mL / NET: -706 mL    26 Mar 2019 07:01  -  26 Mar 2019 17:50  --------------------------------------------------------  IN: 240 mL / OUT: 0 mL / NET: 240 mL      MEDICATIONS  (STANDING):  calcitriol   Capsule 0.5 MICROGram(s) Oral daily  calcium carbonate 1250 mG  + Vitamin D (OsCal 500 + D) 1 Tablet(s) Oral two times a day  cefTRIAXone   IVPB      cefTRIAXone   IVPB 2 Gram(s) IV Intermittent every 24 hours  chlorhexidine 4% Liquid 1 Application(s) Topical <User Schedule>  dextrose 5%. 1000 milliLiter(s) (50 mL/Hr) IV Continuous <Continuous>  dextrose 50% Injectable 12.5 Gram(s) IV Push once  dextrose 50% Injectable 25 Gram(s) IV Push once  dextrose 50% Injectable 25 Gram(s) IV Push once  furosemide   Injectable 40 milliGRAM(s) IV Push once  gabapentin 100 milliGRAM(s) Oral every 12 hours  insulin lispro (HumaLOG) corrective regimen sliding scale   SubCutaneous three times a day before meals  insulin lispro (HumaLOG) corrective regimen sliding scale   SubCutaneous at bedtime  levothyroxine 175 MICROGram(s) Oral daily  magnesium oxide 400 milliGRAM(s) Oral three times a day with meals  milrinone Infusion 0.25 MICROgram(s)/kG/Min (5.992 mL/Hr) IV Continuous <Continuous>  rifaximin 550 milliGRAM(s) Oral two times a day  spironolactone 25 milliGRAM(s) Oral daily    MEDICATIONS  (PRN):  benzocaine 15 mG/menthol 3.6 mG Lozenge 1 Lozenge Oral every 4 hours PRN Sore Throat  dextrose 40% Gel 15 Gram(s) Oral once PRN Blood Glucose LESS THAN 70 milliGRAM(s)/deciliter  glucagon  Injectable 1 milliGRAM(s) IntraMuscular once PRN Glucose LESS THAN 70 milligrams/deciliter    LABS:                        8.8    8.18  )-----------( 267      ( 26 Mar 2019 09:13 )             26.7     03-26    124<L>  |  84<L>  |  18  ----------------------------<  241<H>  4.0   |  22  |  0.83    Ca    6.0<LL>      26 Mar 2019 06:16  Phos  4.2     03-25  Mg     2.0     03-26    TPro  7.2  /  Alb  3.1<L>  /  TBili  3.2<H>  /  DBili  x   /  AST  46<H>  /  ALT  29  /  AlkPhos  382<H>  03-25        CAPILLARY BLOOD GLUCOSE      POCT Blood Glucose.: 142 mg/dL (26 Mar 2019 17:14)  POCT Blood Glucose.: 198 mg/dL (26 Mar 2019 11:34)  POCT Blood Glucose.: 235 mg/dL (26 Mar 2019 07:21)  POCT Blood Glucose.: 189 mg/dL (25 Mar 2019 21:36)          REVIEW OF SYSTEMS:  CONSTITUTIONAL: No fever, weight loss, or fatigue  EYES: No eye pain, visual disturbances, or discharge  ENMT:  No difficulty hearing, tinnitus, vertigo; No sinus or throat pain  NECK: No pain or stiffness  RESPIRATORY: No cough, wheezing, chills or hemoptysis; No shortness of breath  CARDIOVASCULAR: No chest pain, palpitations, dizziness, or leg swelling  GASTROINTESTINAL: No abdominal or epigastric pain. No nausea, vomiting, or hematemesis; No diarrhea or constipation. No melena or hematochezia.  GENITOURINARY: No dysuria, frequency, hematuria, or incontinence  NEUROLOGICAL: No headaches, memory loss, loss of strength, numbness, or tremors      Consultant(s) Notes Reviewed:  [x ] YES  [ ] NO    PHYSICAL EXAM:  GENERAL: NAD, well-groomed, well-developed, not in any distress ,  HEAD:  Atraumatic, Normocephalic  EYES: EOMI, PERRLA, conjunctiva and sclera clear  NECK: Supple, No JVD, Normal thyroid  NERVOUS SYSTEM:  Alert & Oriented X3, No focal deficit   CHEST/LUNG: Good air entry bilateral with no  rales, rhonchi, wheezing, or rubs  HEART: Regular rate and rhythm; No murmurs, rubs, or gallops  ABDOMEN: Soft, Nontender, Nondistended; Bowel sounds present  EXTREMITIES:  2+ Peripheral Pulses, No clubbing, cyanosis, or edema    Care Discussed with Consultants/Other Providers [ x] YES  [ ] NO

## 2019-03-26 NOTE — PROGRESS NOTE ADULT - ASSESSMENT
The patient is a deidre 63-year-old female with the past medical history of hypertension, diabetes, nonishemic cardiomyopathy, presumed parathyroidectomy, history of thyroid cancer, presents with mechanical fall.  The patient at present has hypervolemic hyponatremia.  She also has hypocalcemia, but again an old finding.    Gram neg rich sepsis  Hyponatremia/Hypocalcemia/Hypomagnesemia        1.	Renal.  Calcitriol/Os-Donta. Mag oxide 400mg po tid x 2 days;  Pt was encouraged to increase intake of protein and restrict fluids without calories.  May need samsca if the serum sodium drops any further   2.	Cardiology.   Continue with milrinone.  On Bumex 2mg orally daily    3.	Infectious Disease.    Continue with intravenous antibiotics.    4.	Endo-Restart Synthroid 175 today

## 2019-03-26 NOTE — PROGRESS NOTE ADULT - ASSESSMENT
64 yo F h/o severe non ischemic cardiomyopathy (EF 10%, LVIDd 6.1 cm) identified in 2013, moderate-severe MR, severe TR, s/p AICD on home milrinone infusion via PICC, afib on aspirin & elliquis, DM2, HTN, thyroid cancer s/p thyroidectomy presents with fall, found to have stable subdural hematoma, and as well as Klebsiella bacteremia likely related to PICC line infection.     On exam patient appears close to euvolemic. Patient is a poor candidate for advanced therapies given lack of insight despite repeated education regarding her condition and treatment recommendations. Her renal function is normal. SBP 90-100s, not on neurohormonal antagonists. She is afebrile without leukocytosis. Last BCx 3/22 with NGTD. Lactate noted to be elevated at 2.1 yesterday on milrinone 0.5 likely related to low output. 64 yo F h/o severe non ischemic cardiomyopathy (EF 10%, LVIDd 6.1 cm) identified in 2013, moderate-severe MR, severe TR, s/p AICD on home milrinone infusion via PICC, afib on aspirin & elliquis, DM2, HTN, thyroid cancer s/p thyroidectomy presents with fall, found to have stable subdural hematoma, and as well as Klebsiella bacteremia likely related to PICC line infection.     On exam patient appears mildly volume up with elevated JVP. Patient is a poor candidate for advanced therapies given lack of insight despite repeated education regarding her condition and treatment recommendations. Her renal function is normal. SBP 90-100s, not on neurohormonal antagonists. She is afebrile without leukocytosis. Last BCx 3/22 with NGTD. Lactate noted to be elevated at 2.1 yesterday on milrinone 0.5 likely related to low output. 62 yo F h/o severe non ischemic cardiomyopathy (EF 10%, LVIDd 6.1 cm) identified in 2013, moderate-severe MR, severe TR, s/p AICD on home milrinone infusion via PICC, afib on aspirin & elliquis, DM2, HTN, thyroid cancer s/p thyroidectomy presents with fall, found to have stable subdural hematoma, and as well as Klebsiella bacteremia likely related to PICC line infection.     On exam patient appears volume up with elevated JVP. Patient is a poor candidate for advanced therapies given lack of insight despite repeated education regarding her condition and treatment recommendations. Her renal function is normal. SBP 90-100s, not on neurohormonal antagonists. She is afebrile without leukocytosis. Last BCx 3/22 with NGTD. Lactate noted to be elevated at 2.1 yesterday and now is increased to 3.7 with a central venous oxygen saturation of 38% (CI 1.4) on milrinone 0.5mcg/kg/min. She is not adequately supported on current dose of inotropes, however despite extensive conversation she is refusing PICC line placement and home milrinone due to concern for recurrent infection. She verbalized understanding of discussed risks of discontinuing milrinone including further decline and potentially death.

## 2019-03-26 NOTE — PROGRESS NOTE ADULT - ASSESSMENT
63 year old female PMH severe cardiomyopathy c/b CHF w EF 10% s/p AICD on home milrinone infusion via PICC, afib on aspirin & Eliquis, DM2, HTN, thyroid cancer s/p thyroidectomy who presented for mechanical fall on 3/19/19. In the ED febrile to 101.8, tachycardic to > 130, hypotensive to SBP in the 80's. WBC on presentation of 20.8 with 83% PMN. Transaminitis with Alk Phos of 451, , ALT of 43 and T Bili of 4.7. Lactic acid of 2.5. U/A with negative leukocyte esterase and negative nitrites. RVP negative. CT Chest/Abdomen/Pelvis with no obvious intraabdominal pathology and chest with ground glass opacities in the right middle and right lower lobes (present on 11/6/18). Blood cultures resulted with Klebsiella pneumoniae.     Overall, Klebsiella bacteremia likely due to PICC infection - patient with chills with Milrinone infusion changes. Pneumonia is possible however, the ground glass opacities in the lungs were present back in 11/2018. Unsure about etiology of new leukocytosis today - would obtain differentiation and repeat CBC. Would escalate from Aztreonam to Meropenem given vasopressor requirements. Would continue Vancomycin until BCx at 48 hours.      --Followup Blood Cultures NGTD  - PICC line removed  --Followup susceptibilities of Klebsiella pneumoniae- pan sensitive , abs changed to ceftriaxone  -WBC improved  - d/c arteaga  -day #6 since last postive blood culture. Will treat for 10-14 days, might be able to change to po quinolone after 7 days IV   Need to make sure that on no meds that will interact with the quinolone  - team is addressing the heart failure

## 2019-03-27 DIAGNOSIS — Z51.5 ENCOUNTER FOR PALLIATIVE CARE: ICD-10-CM

## 2019-03-27 LAB
ANION GAP SERPL CALC-SCNC: 35 MMOL/L — HIGH (ref 5–17)
BUN SERPL-MCNC: 22 MG/DL — SIGNIFICANT CHANGE UP (ref 7–23)
CALCIUM SERPL-MCNC: 7 MG/DL — LOW (ref 8.4–10.5)
CHLORIDE SERPL-SCNC: 78 MMOL/L — LOW (ref 96–108)
CO2 SERPL-SCNC: 11 MMOL/L — LOW (ref 22–31)
CREAT SERPL-MCNC: 1.08 MG/DL — SIGNIFICANT CHANGE UP (ref 0.5–1.3)
CULTURE RESULTS: SIGNIFICANT CHANGE UP
CULTURE RESULTS: SIGNIFICANT CHANGE UP
GLUCOSE BLDC GLUCOMTR-MCNC: 123 MG/DL — HIGH (ref 70–99)
GLUCOSE BLDC GLUCOMTR-MCNC: 250 MG/DL — HIGH (ref 70–99)
GLUCOSE BLDC GLUCOMTR-MCNC: 273 MG/DL — HIGH (ref 70–99)
GLUCOSE BLDC GLUCOMTR-MCNC: 79 MG/DL — SIGNIFICANT CHANGE UP (ref 70–99)
GLUCOSE SERPL-MCNC: 64 MG/DL — LOW (ref 70–99)
HCT VFR BLD CALC: 28.9 % — LOW (ref 34.5–45)
HGB BLD-MCNC: 9.1 G/DL — LOW (ref 11.5–15.5)
LACTATE SERPL-SCNC: 7.1 MMOL/L — CRITICAL HIGH (ref 0.7–2)
MCHC RBC-ENTMCNC: 24 PG — LOW (ref 27–34)
MCHC RBC-ENTMCNC: 31.5 GM/DL — LOW (ref 32–36)
MCV RBC AUTO: 76.3 FL — LOW (ref 80–100)
PLATELET # BLD AUTO: 272 K/UL — SIGNIFICANT CHANGE UP (ref 150–400)
POTASSIUM SERPL-MCNC: 4.9 MMOL/L — SIGNIFICANT CHANGE UP (ref 3.5–5.3)
POTASSIUM SERPL-SCNC: 4.9 MMOL/L — SIGNIFICANT CHANGE UP (ref 3.5–5.3)
RBC # BLD: 3.79 M/UL — LOW (ref 3.8–5.2)
RBC # FLD: 23.6 % — HIGH (ref 10.3–14.5)
SODIUM SERPL-SCNC: 124 MMOL/L — LOW (ref 135–145)
SPECIMEN SOURCE: SIGNIFICANT CHANGE UP
SPECIMEN SOURCE: SIGNIFICANT CHANGE UP
WBC # BLD: 15.35 K/UL — HIGH (ref 3.8–10.5)
WBC # FLD AUTO: 15.35 K/UL — HIGH (ref 3.8–10.5)

## 2019-03-27 PROCEDURE — 99233 SBSQ HOSP IP/OBS HIGH 50: CPT

## 2019-03-27 PROCEDURE — 99223 1ST HOSP IP/OBS HIGH 75: CPT

## 2019-03-27 RX ORDER — FUROSEMIDE 40 MG
80 TABLET ORAL ONCE
Qty: 0 | Refills: 0 | Status: COMPLETED | OUTPATIENT
Start: 2019-03-28 | End: 2019-03-28

## 2019-03-27 RX ORDER — ENOXAPARIN SODIUM 100 MG/ML
40 INJECTION SUBCUTANEOUS EVERY 24 HOURS
Qty: 0 | Refills: 0 | Status: DISCONTINUED | OUTPATIENT
Start: 2019-03-27 | End: 2019-03-28

## 2019-03-27 RX ORDER — FUROSEMIDE 40 MG
80 TABLET ORAL ONCE
Qty: 0 | Refills: 0 | Status: COMPLETED | OUTPATIENT
Start: 2019-03-27 | End: 2019-03-27

## 2019-03-27 RX ORDER — MILRINONE LACTATE 1 MG/ML
0.12 INJECTION, SOLUTION INTRAVENOUS
Qty: 20 | Refills: 0 | Status: DISCONTINUED | OUTPATIENT
Start: 2019-03-27 | End: 2019-03-28

## 2019-03-27 RX ADMIN — Medication 80 MILLIGRAM(S): at 17:31

## 2019-03-27 RX ADMIN — MAGNESIUM OXIDE 400 MG ORAL TABLET 400 MILLIGRAM(S): 241.3 TABLET ORAL at 10:06

## 2019-03-27 RX ADMIN — BENZOCAINE AND MENTHOL 1 LOZENGE: 5; 1 LIQUID ORAL at 23:30

## 2019-03-27 RX ADMIN — GABAPENTIN 100 MILLIGRAM(S): 400 CAPSULE ORAL at 07:37

## 2019-03-27 RX ADMIN — Medication 6: at 12:06

## 2019-03-27 RX ADMIN — MAGNESIUM OXIDE 400 MG ORAL TABLET 400 MILLIGRAM(S): 241.3 TABLET ORAL at 12:05

## 2019-03-27 RX ADMIN — CEFTRIAXONE 100 GRAM(S): 500 INJECTION, POWDER, FOR SOLUTION INTRAMUSCULAR; INTRAVENOUS at 12:05

## 2019-03-27 RX ADMIN — Medication 175 MICROGRAM(S): at 06:38

## 2019-03-27 RX ADMIN — MILRINONE LACTATE 3 MICROGRAM(S)/KG/MIN: 1 INJECTION, SOLUTION INTRAVENOUS at 17:31

## 2019-03-27 RX ADMIN — MAGNESIUM OXIDE 400 MG ORAL TABLET 400 MILLIGRAM(S): 241.3 TABLET ORAL at 17:31

## 2019-03-27 RX ADMIN — SPIRONOLACTONE 25 MILLIGRAM(S): 25 TABLET, FILM COATED ORAL at 07:37

## 2019-03-27 RX ADMIN — MILRINONE LACTATE 5.99 MICROGRAM(S)/KG/MIN: 1 INJECTION, SOLUTION INTRAVENOUS at 06:38

## 2019-03-27 RX ADMIN — Medication 1 TABLET(S): at 17:32

## 2019-03-27 RX ADMIN — Medication 1 TABLET(S): at 07:37

## 2019-03-27 RX ADMIN — Medication 4: at 17:33

## 2019-03-27 RX ADMIN — CALCITRIOL 0.5 MICROGRAM(S): 0.5 CAPSULE ORAL at 12:05

## 2019-03-27 RX ADMIN — MILRINONE LACTATE 5.99 MICROGRAM(S)/KG/MIN: 1 INJECTION, SOLUTION INTRAVENOUS at 10:06

## 2019-03-27 RX ADMIN — BENZOCAINE AND MENTHOL 1 LOZENGE: 5; 1 LIQUID ORAL at 17:31

## 2019-03-27 RX ADMIN — GABAPENTIN 100 MILLIGRAM(S): 400 CAPSULE ORAL at 17:32

## 2019-03-27 NOTE — CONSULT NOTE ADULT - PROBLEM SELECTOR RECOMMENDATION 3
Spoke with pt at bedside.  She is clear that she does not want to continue with Milrinone gtt and does not want a PICC line placed.  She said "the one thing I know is that I got an infection from that PICC line and I don't want another one.  No one can tell me that the Milrinone was defintely working so I don't want it!"  She wants to be managed on oral meds and understands that they may not control her symptoms of CHF like the milrinone can.  she refused to speak of code status.  She stated that "if her maker was to call her she would go"  On last admission she filled out a HCP naming her dtrs as her HCP.  Pt wants to finish her iv abx and go home.  I spoke with Sadaf Russell, NAIN and Dr. Rojas and they are aware of my conversation with her. Palliative care will sign off.  Please reconsult if necessary.

## 2019-03-27 NOTE — PROGRESS NOTE ADULT - ASSESSMENT
The patient is a deidre 63-year-old female with the past medical history of hypertension, diabetes, nonishemic cardiomyopathy, presumed parathyroidectomy, history of thyroid cancer, presents with mechanical fall.  The patient at present has hypervolemic hyponatremia.  She also has hypocalcemia, but again an old finding.    Gram neg rich sepsis  Hyponatremia/Hypocalcemia/Hypomagnesemia  She does not want Milrinone infusion and reduction has lead to decreased urine output and elevated lactate         1.	Renal.  Calcitriol/Os-Donta. Mag oxide 400mg po tid to end today;  Pt was encouraged to increase intake of protein and restrict fluids without calories.  However the reduction in EF will propagate the hyponatremia. I dont believe samsca is the correct Rx at present   2.	Cardiology.   Continue with milrinone at the reduced dose and the pt wants this medication off.  She is at high risk for sudden death!!!.  Start Bumex 2mg po bid   3.	Infectious Disease.    Continue with intravenous antibiotics.    4.	Endo-Restart Synthroid 175 today     I would get palliative care for GOC discussion

## 2019-03-27 NOTE — CONSULT NOTE ADULT - CONSULT REQUESTED DATE/TIME
19-Mar-2019 23:35
20-Mar-2019 01:48
20-Mar-2019 11:32
20-Mar-2019 14:12
20-Mar-2019 16:34
22-Mar-2019 13:07
27-Mar-2019 14:12
20-Mar-2019 13:25

## 2019-03-27 NOTE — PROGRESS NOTE ADULT - ATTENDING COMMENTS
Patient seen and examined.  Agree with above.   -AC and APT on hold due to SDH  -pt. does not want to continue with milrinone  -follow up heart failure and palliative care    Kunal Muller MD

## 2019-03-27 NOTE — PROGRESS NOTE ADULT - SUBJECTIVE AND OBJECTIVE BOX
EP ATTENDING    tele: sinus tachycardia, biv-pacing, no NSVT overnight    no palpitations, no syncope, no angina    benzocaine 15 mG/menthol 3.6 mG Lozenge 1 Lozenge Oral every 4 hours PRN  calcitriol   Capsule 0.5 MICROGram(s) Oral daily  calcium carbonate 1250 mG  + Vitamin D (OsCal 500 + D) 1 Tablet(s) Oral two times a day  cefTRIAXone   IVPB      cefTRIAXone   IVPB 2 Gram(s) IV Intermittent every 24 hours  chlorhexidine 4% Liquid 1 Application(s) Topical <User Schedule>  dextrose 40% Gel 15 Gram(s) Oral once PRN  dextrose 5%. 1000 milliLiter(s) IV Continuous <Continuous>  dextrose 50% Injectable 12.5 Gram(s) IV Push once  dextrose 50% Injectable 25 Gram(s) IV Push once  dextrose 50% Injectable 25 Gram(s) IV Push once  gabapentin 100 milliGRAM(s) Oral every 12 hours  glucagon  Injectable 1 milliGRAM(s) IntraMuscular once PRN  insulin lispro (HumaLOG) corrective regimen sliding scale   SubCutaneous three times a day before meals  insulin lispro (HumaLOG) corrective regimen sliding scale   SubCutaneous at bedtime  levothyroxine 175 MICROGram(s) Oral daily  magnesium oxide 400 milliGRAM(s) Oral three times a day with meals  milrinone Infusion 0.25 MICROgram(s)/kG/Min IV Continuous <Continuous>  rifaximin 550 milliGRAM(s) Oral two times a day  spironolactone 25 milliGRAM(s) Oral daily                            8.8    8.18  )-----------( 267      ( 26 Mar 2019 09:13 )             26.7       03-27    124<L>  |  78<L>  |  22  ----------------------------<  64<L>  4.9   |  11<L>  |  1.08    Ca    7.0<L>      27 Mar 2019 07:06  Phos  4.2     03-25  Mg     2.0     03-26    TPro  7.2  /  Alb  3.1<L>  /  TBili  3.2<H>  /  DBili  x   /  AST  46<H>  /  ALT  29  /  AlkPhos  382<H>  03-25      T(C): 36.5 (03-27-19 @ 05:00), Max: 37.1 (03-26-19 @ 13:02)  HR: 108 (03-27-19 @ 05:00) (103 - 112)  BP: 99/69 (03-27-19 @ 05:00) (96/64 - 102/72)  RR: 17 (03-27-19 @ 05:00) (17 - 18)  SpO2: 98% (03-27-19 @ 05:00) (98% - 100%)  Wt(kg): --    JVP 6  tachy, no murmurs  CTAB  soft nt/nd  no c/c/e    3/20/19 device interrogation: unremarkable      A/P) She is a pleasant 64 y/o female PMH paroxysmal atrial fibrillation and a severe NICM (LVEF 10-15%) who had a [employer] Biv-ICD implanted at [place]. A LHC at [place] was also unremarkable. She now returns with a mechanical fall resulting in a subdural hematoma. A/C on hold as per neurosurgery. EP is now called for further recommendations and consideration for Watchman if she can't tolerate a/c long term. Review of her EKGs and tele show the rhythm is sinus with adequate BiV-tracking (RBBB in V1, negative in I/L). She hasn't had any more significant NSVT despite milrinone. There were no arrhythmic events on her device interrogation to explain her fall.    -medical therapy for CHF as per cardiology/CHF teams  -f/u neurosurgery regarding if/when anticoagulation for PAF with elevated chads-vasc can ever be restarted  -if she can't tolerate a/c long term then next step would be outpatient left atrial appendage occlusion with Watchman  -no specific EP intervention indicated at this time      Dariana Hallman M.D., [facility]  Cardiac Electrophysiology  [facility]  [address] [place] [address], [address] [place] [address]  [email or web address]    office [phone]  pager [phone]

## 2019-03-27 NOTE — CHART NOTE - NSCHARTNOTEFT_GEN_A_CORE
Lactate level 7.1    CHF Lacey Mclaughlin NP from CHF team notified    Will continue to titrate off Primacor drip to off in AM & plan discharge tomorrow PM as per patient wishes.  Dr. Rojas aware as well

## 2019-03-27 NOTE — PROGRESS NOTE ADULT - SUBJECTIVE AND OBJECTIVE BOX
pt seen and examined, no complaints, no events on tele     benzocaine 15 mG/menthol 3.6 mG Lozenge 1 Lozenge Oral every 4 hours PRN  calcitriol   Capsule 0.5 MICROGram(s) Oral daily  calcium carbonate 1250 mG  + Vitamin D (OsCal 500 + D) 1 Tablet(s) Oral two times a day  cefTRIAXone   IVPB      cefTRIAXone   IVPB 2 Gram(s) IV Intermittent every 24 hours  chlorhexidine 4% Liquid 1 Application(s) Topical <User Schedule>  dextrose 40% Gel 15 Gram(s) Oral once PRN  dextrose 5%. 1000 milliLiter(s) IV Continuous <Continuous>  dextrose 50% Injectable 12.5 Gram(s) IV Push once  dextrose 50% Injectable 25 Gram(s) IV Push once  dextrose 50% Injectable 25 Gram(s) IV Push once  gabapentin 100 milliGRAM(s) Oral every 12 hours  glucagon  Injectable 1 milliGRAM(s) IntraMuscular once PRN  insulin lispro (HumaLOG) corrective regimen sliding scale   SubCutaneous three times a day before meals  insulin lispro (HumaLOG) corrective regimen sliding scale   SubCutaneous at bedtime  levothyroxine 175 MICROGram(s) Oral daily  magnesium oxide 400 milliGRAM(s) Oral three times a day with meals  milrinone Infusion 0.25 MICROgram(s)/kG/Min IV Continuous <Continuous>  rifaximin 550 milliGRAM(s) Oral two times a day  spironolactone 25 milliGRAM(s) Oral daily                            8.8    8.18  )-----------( 267      ( 26 Mar 2019 09:13 )             26.7       Hemoglobin: 8.8 g/dL (03-26 @ 09:13)  Hemoglobin: 9.0 g/dL (03-25 @ 22:29)  Hemoglobin: 8.8 g/dL (03-24 @ 08:58)  Hemoglobin: 9.4 g/dL (03-23 @ 19:28)  Hemoglobin: 9.1 g/dL (03-23 @ 09:49)      03-26    124<L>  |  84<L>  |  18  ----------------------------<  241<H>  4.0   |  22  |  0.83    Ca    6.0<LL>      26 Mar 2019 06:16  Phos  4.2     03-25  Mg     2.0     03-26    TPro  7.2  /  Alb  3.1<L>  /  TBili  3.2<H>  /  DBili  x   /  AST  46<H>  /  ALT  29  /  AlkPhos  382<H>  03-25    Creatinine Trend: 0.83<--, 0.77<--, 0.71<--, 0.76<--, 0.74<--, 0.85<--    COAGS:           T(C): 36.3 (03-26-19 @ 20:46), Max: 37.1 (03-26-19 @ 04:55)  HR: 107 (03-26-19 @ 20:46) (107 - 113)  BP: 101/72 (03-26-19 @ 20:46) (94/65 - 102/75)  RR: 18 (03-26-19 @ 20:46) (16 - 18)  SpO2: 100% (03-26-19 @ 20:46) (98% - 100%)  Wt(kg): --    I&O's Summary    25 Mar 2019 07:01  -  26 Mar 2019 07:00  --------------------------------------------------------  IN: 1194 mL / OUT: 1900 mL / NET: -706 mL    26 Mar 2019 07:01  -  27 Mar 2019 03:36  --------------------------------------------------------  IN: 280 mL / OUT: 150 mL / NET: 130 mL    	  Lymphatic: No lymphadenopathy trace edema in LE bilaterally   Cardiovascular: Normal S1 S2, + JVD, No murmurs , Peripheral pulses palpable 2+ bilaterally  Respiratory: Lungs clear to auscultation  Gastrointestinal:  Soft, Non-tender, + BS	  Skin: No rashes, No ecchymoses, No cyanosis, warm to touch  Psychiatry:  Mood & affect appropriate      TELEMETRY: Afib       ECG: < from: 12 Lead ECG (03.20.19 @ 05:58) >  WIDE QRS TACHYCARDIA  LEFT AXIS DEVIATION  RIGHT BUNDLE BRANCH BLOCK  LEFT VENTRICULAR HYPERTROPHY WITH REPOLARIZATION ABNORMALITY  INFERIOR INFARCT , AGE UNDETERMINED  ABNORMAL ECG    < end of copied text >    	  RADIOLOGY:  OTHER:     DIAGNOSTIC TESTING:  [ ] Echocardiogram: < from: TTE with Doppler (w/Cont) (11.07.18 @ 05:53) >  Conclusions:  1. Tethered mitral valve leaflets with normal opening.  Mitral annular calcification and thickened  mitral leaflet  tips Moderate mitral regurgitation.  2. Calcified trileaflet aortic valve with normal opening.  3. Moderately dilated left atrium.  LA volume index = 43  cc/m2.  4. Eccentric left ventricular hypertrophy (dilated left  ventricle with normal relative wall thickness).  5. Severe global left ventricularsystolic dysfunction.  Endocardial visualization enhanced with intravenous  injection of Ultrasonic Enhancing Agent (Definity). No LV  thrombus.  Septal flattening consistent with right  ventricular overload.  6. Severe diastolic dysfunction with elevated filling  pressures  7. Severe right atrial enlargement.  8. Right ventricular enlargement with decreased right  ventricular systolic function.  A device wire is noted in  the right heart.  9. Dilated tricuspid annulus with malcoaptation of  tricuspid leaflets. Severe tricuspid regurgitation.  *** No previous Echo exam.    < end of copied text >    [ ]  Catheterization:  [ ] Stress Test:    	         ASSESSMENT/PLAN:  64 yo F with history of severe NICM s/p ICD on home milrinone, AF on ac with eliquis, HTN, DM, thyroid CA s/p thyroidectomy who is being seen for heart failure and cardiomyopathy.    ABX per ID, follow up on cultures   Cont to hold A/C and antiplts meds  at present , would need to restart once cleared by Neuro sx   If pt cant be on A/C , then the pt need a outpatient LOLITA occlusion ( watchman)    GI / DVT prophylaxis,  keep K>4, mag >2.0    cont inotropic support with Primacor , for depressed EF   Neurosx following for SDH   no further EPS work up needed at present   D/W Dr Hallman

## 2019-03-27 NOTE — CONSULT NOTE ADULT - PROVIDER SPECIALTY LIST ADULT
Cardiology
Electrophysiology
Heart Failure
Infectious Disease
Neurosurgery
Trauma Surgery
Palliative Care
Internal Medicine

## 2019-03-27 NOTE — PROGRESS NOTE ADULT - ATTENDING COMMENTS
Very unfortunate situation in that patient is refusing to continue milrinone infusion and she has requested that it be weaned off. Her lactate has continued to rise with the weaning process, but she denies that her heart is the issue. She does not have any insight into her condition, but is able to verbalize an understanding that she could decline, be rehospitalized, or die by demanding the milrinone be removed. She believes that her condition is better now than yesterday with the milrinone dose being reduced despite the fact that her lactate has nearly doubled to 7.    Given her wishes, we will further reduce the milrinone to 0.125 mcg/kg/min now and discontinue it at 0800 tomorrow morning.  Please increase lasix to 80 mg IV bid for tonight and tomorrow morning then we will switch her to torsemide 80 mg po BID for discharge.  Continue spironolactone 25 mg once daily.  Antibiotics per ID recs.  Palliative Care consult appreciated.  Anticipate discharge home on oral diuretics and HF meds plus oral antibiotics. She should follow-up in HF NP clinic on Monday. Very unfortunate situation in that patient is refusing to continue milrinone infusion and she has requested that it be weaned off. Her lactate has continued to rise with the weaning process, but she denies that her heart is the issue. She does not have any insight into her condition, but is able to verbalize an understanding that she could decline, be rehospitalized, or die by demanding the milrinone be removed. She believes that her condition is better now than yesterday with the milrinone dose being reduced despite the fact that her lactate has nearly doubled to 7.    Given her wishes, we will further reduce the milrinone to 0.125 mcg/kg/min now and discontinue it at 0800 tomorrow morning.  Please increase lasix to 80 mg IV bid for tonight and tomorrow morning.    If she insists on leaving the hospital with an elevated lactate despite our warnings, she will be discharged against medical advice.  She should then be switched to torsemide 80 mg po BID for discharge and continue spironolactone 25 mg once daily.  Antibiotics per ID recs.  Palliative Care consult appreciated.  She is welcome to follow-up in ambulatory HF NP clinic on Monday if she is able.

## 2019-03-27 NOTE — CONSULT NOTE ADULT - CONSULT REASON
CHF
Klebsiella Bacteremia
Mechanical fall, SDH
PAF, fall with subdural hematoma, a/c management of AF
hx of congestive heart failure
sp fall
GOc
Medical Management

## 2019-03-27 NOTE — CONSULT NOTE ADULT - ASSESSMENT
Severe cardiomyopathy c/b CHF, AICD, on home milrinone gtt presented s/p fall down 14 steps.  Found to have SDH, shock cardiogenic vs septic+/- PNA.  Palliative care called for GOC

## 2019-03-27 NOTE — PROGRESS NOTE ADULT - ASSESSMENT
64 yo F h/o severe cardiomyopathy c/b CHF w EF 10% s/p AICD on home milrinone infusion via PICC, afib on aspirin & elliquis, DM2, HTN, thyroid cancer s/p thyroidectomy presents for mechanical fall, found to have SDH, admitted to MICU for multifactorial shock likely cardiogenic and septic shock i/s/o possible PNA.    Sepsis  with Bacteremia K Pneumonia  : - Present on admission. ID helping.   - IV Rocephin till tomorrow and can be changed to PO Levaquin per ID.    Mechanical Fall with SDH   - SDH stable on repeat CT head.   - appreciate neurosurgery recs    Severe cardiomyopathy c/b CHF s/p AICD on home milrinone infusion via PICC with Shock ; Multifactorial shock w cardiogenic & septic shock i/s/o PNA. Management per  Cardiology, HF Team and EP.   - weaning off Milrinone gtt but risks including worsening CHF and possible death explained to patient . patient understands but doesnt want any more PICC line Or IV Milrinone.   - TTE    Anemia   : -Chronic.  - Daily CBC    Hypocalcemia ,Hypokalemia & hypomagnesemia. Cr stable. Renal helping.   - monitor BMP, replete for K > 4, Mg > 2    Congestive hepatopathy : Sec to CHF .Abd mildly distended, non-tender. Cholelithiasis on prior US. Lipase 140. Ammonia wnl.   - Stable.     DM2; Sugars in good range.   - ISS q6h    Thyroid cancer s/p thyroidectomy  - on synthroid     - DVT ppx: SCDs, hold pharmacological ppx  - tx SDH as above    Disposition : Palliative consult noted. DC planning home tomorrow.

## 2019-03-27 NOTE — PROGRESS NOTE ADULT - SUBJECTIVE AND OBJECTIVE BOX
NEPHROLOGY-[facility] [phone]        Patient seen and examined in bed.  She was about the same  She does not want milrinone infusion at home         MEDICATIONS  (STANDING):  calcitriol   Capsule 0.5 MICROGram(s) Oral daily  calcium carbonate 1250 mG  + Vitamin D (OsCal 500 + D) 1 Tablet(s) Oral two times a day  cefTRIAXone   IVPB      cefTRIAXone   IVPB 2 Gram(s) IV Intermittent every 24 hours  chlorhexidine 4% Liquid 1 Application(s) Topical <User Schedule>  dextrose 5%. 1000 milliLiter(s) (50 mL/Hr) IV Continuous <Continuous>  dextrose 50% Injectable 12.5 Gram(s) IV Push once  dextrose 50% Injectable 25 Gram(s) IV Push once  dextrose 50% Injectable 25 Gram(s) IV Push once  gabapentin 100 milliGRAM(s) Oral every 12 hours  insulin lispro (HumaLOG) corrective regimen sliding scale   SubCutaneous three times a day before meals  insulin lispro (HumaLOG) corrective regimen sliding scale   SubCutaneous at bedtime  levothyroxine 175 MICROGram(s) Oral daily  magnesium oxide 400 milliGRAM(s) Oral three times a day with meals  milrinone Infusion 0.25 MICROgram(s)/kG/Min (5.992 mL/Hr) IV Continuous <Continuous>  rifaximin 550 milliGRAM(s) Oral two times a day  spironolactone 25 milliGRAM(s) Oral daily      VITAL:  T(C): , Max: 37.1 (03-26-19 @ 13:02)  T(F): , Max: 98.7 (03-26-19 @ 13:02)  HR: 108 (03-27-19 @ 05:00)  BP: 99/69 (03-27-19 @ 05:00)  BP(mean): --  RR: 17 (03-27-19 @ 05:00)  SpO2: 98% (03-27-19 @ 05:00)  Wt(kg): --    I and O's:    03-26 @ 07:01  -  03-27 @ 07:00  --------------------------------------------------------  IN: 452 mL / OUT: 350 mL / NET: 102 mL          PHYSICAL EXAM:    Constitutional: NAD  Neck:  unable to assess  Respiratory: CTAB/L  Cardiovascular: S1 and S2  Gastrointestinal: BS+, soft, NT/ND  Extremities: +  peripheral edema  Neurological: A/O x 3, no focal deficits  Psychiatric: Normal mood, normal affect  : No Gustafson + female cathater   Skin: No rashes  Access: Not applicable    LABS:                        8.8    8.18  )-----------( 267      ( 26 Mar 2019 09:13 )             26.7     03-27    124<L>  |  78<L>  |  22  ----------------------------<  64<L>  4.9   |  11<L>  |  1.08    Ca    7.0<L>      27 Mar 2019 07:06  Phos  4.2     03-25  Mg     2.0     03-26    TPro  7.2  /  Alb  3.1<L>  /  TBili  3.2<H>  /  DBili  x   /  AST  46<H>  /  ALT  29  /  AlkPhos  382<H>  03-25          Urine Studies:          RADIOLOGY & ADDITIONAL STUDIES:

## 2019-03-27 NOTE — PROGRESS NOTE ADULT - SUBJECTIVE AND OBJECTIVE BOX
Subjective:  - Milrinone decreased to 0.25mcg/kg/min yesterday evening.    Medications:  benzocaine 15 mG/menthol 3.6 mG Lozenge 1 Lozenge Oral every 4 hours PRN  calcitriol   Capsule 0.5 MICROGram(s) Oral daily  calcium carbonate 1250 mG  + Vitamin D (OsCal 500 + D) 1 Tablet(s) Oral two times a day  cefTRIAXone   IVPB      cefTRIAXone   IVPB 2 Gram(s) IV Intermittent every 24 hours  chlorhexidine 4% Liquid 1 Application(s) Topical <User Schedule>  dextrose 40% Gel 15 Gram(s) Oral once PRN  dextrose 5%. 1000 milliLiter(s) IV Continuous <Continuous>  dextrose 50% Injectable 12.5 Gram(s) IV Push once  dextrose 50% Injectable 25 Gram(s) IV Push once  dextrose 50% Injectable 25 Gram(s) IV Push once  gabapentin 100 milliGRAM(s) Oral every 12 hours  glucagon  Injectable 1 milliGRAM(s) IntraMuscular once PRN  insulin lispro (HumaLOG) corrective regimen sliding scale   SubCutaneous three times a day before meals  insulin lispro (HumaLOG) corrective regimen sliding scale   SubCutaneous at bedtime  levothyroxine 175 MICROGram(s) Oral daily  magnesium oxide 400 milliGRAM(s) Oral three times a day with meals  milrinone Infusion 0.25 MICROgram(s)/kG/Min IV Continuous <Continuous>  rifaximin 550 milliGRAM(s) Oral two times a day  spironolactone 25 milliGRAM(s) Oral daily      Physical Exam:    Vitals:  Vital Signs Last 24 Hours  T(C): 36.5 (19 @ 05:00), Max: 37.1 (19 @ 13:02)  HR: 108 (19 @ 05:00) (103 - 112)  BP: 99/69 (19 @ 05:00) (96/64 - 102/72)  RR: 17 (19 @ 05:00) (17 - 18)  SpO2: 98% (19 @ 05:00) (98% - 100%)    Weight in k.3 ( @ 09:17)    I&O's Summary    26 Mar 2019 07:01  -  27 Mar 2019 07:00  --------------------------------------------------------  IN: 452 mL / OUT: 350 mL / NET: 102 mL    Tele:  HR 90-110s    General: No distress. Comfortable.  HEENT: EOM intact.  Neck: Neck supple. JVP 18-20 cm H2O. No masses  Chest: Clear to auscultation bilaterally  CV: RRR, Normal S1 and S2. No murmurs, rub, or gallops. Radial pulses normal. + 1 BLE edema  Abdomen: Soft, non-distended, non-tender  Skin: No rashes or skin breakdown  Neurology: Alert and oriented times three. Sensation intact  Psych: Affect normal    Labs:                        9.1    15.35 )-----------( 272      ( 27 Mar 2019 10:01 )             28.9         124<L>  |  78<L>  |  22  ----------------------------<  64<L>  4.9   |  11<L>  |  1.08    Ca    7.0<L>      27 Mar 2019 07:06  Phos  4.2       Mg     2.0         TPro  7.2  /  Alb  3.1<L>  /  TBili  3.2<H>  /  DBili  x   /  AST  46<H>  /  ALT  29  /  AlkPhos  382<H>      Lactate, Blood: 3.5 mmol/L ( @ 13:10)  Lactate, Blood: 2.1 mmol/L ( @ 19:49) Subjective:  - Milrinone decreased to 0.25mcg/kg/min yesterday evening.  - Frustrated this morning and eager to go home  - Noted decrease in urine output and endorses fatigue  - Denies SOB, orthopnea, PND, CP, palpitations, lightheadedness, fevers, chills, abdominal pain    Medications:  benzocaine 15 mG/menthol 3.6 mG Lozenge 1 Lozenge Oral every 4 hours PRN  calcitriol   Capsule 0.5 MICROGram(s) Oral daily  calcium carbonate 1250 mG  + Vitamin D (OsCal 500 + D) 1 Tablet(s) Oral two times a day  cefTRIAXone   IVPB      cefTRIAXone   IVPB 2 Gram(s) IV Intermittent every 24 hours  chlorhexidine 4% Liquid 1 Application(s) Topical <User Schedule>  dextrose 40% Gel 15 Gram(s) Oral once PRN  dextrose 5%. 1000 milliLiter(s) IV Continuous <Continuous>  dextrose 50% Injectable 12.5 Gram(s) IV Push once  dextrose 50% Injectable 25 Gram(s) IV Push once  dextrose 50% Injectable 25 Gram(s) IV Push once  gabapentin 100 milliGRAM(s) Oral every 12 hours  glucagon  Injectable 1 milliGRAM(s) IntraMuscular once PRN  insulin lispro (HumaLOG) corrective regimen sliding scale   SubCutaneous three times a day before meals  insulin lispro (HumaLOG) corrective regimen sliding scale   SubCutaneous at bedtime  levothyroxine 175 MICROGram(s) Oral daily  magnesium oxide 400 milliGRAM(s) Oral three times a day with meals  milrinone Infusion 0.25 MICROgram(s)/kG/Min IV Continuous <Continuous>  rifaximin 550 milliGRAM(s) Oral two times a day  spironolactone 25 milliGRAM(s) Oral daily      Physical Exam:    Vitals:  Vital Signs Last 24 Hours  T(C): 36.5 (19 @ 05:00), Max: 37.1 (19 @ 13:02)  HR: 108 (19 @ 05:00) (103 - 112)  BP: 99/69 (19 @ 05:00) (96/64 - 102/72)  RR: 17 (19 @ 05:00) (17 - 18)  SpO2: 98% (19 @ 05:00) (98% - 100%)    Weight in k.3 ( @ 09:17)    I&O's Summary    26 Mar 2019 07:01  -  27 Mar 2019 07:00  --------------------------------------------------------  IN: 452 mL / OUT: 350 mL / NET: 102 mL    Tele:  HR 90-110s    General: No distress. Comfortable.  HEENT: EOM intact.  Neck: Neck supple. JVP 18-20 cm H2O. No masses  Chest: Clear to auscultation bilaterally  CV: RRR, Normal S1 and S2. No murmurs, rub, or gallops. Radial pulses normal. + 1 BLE edema  Abdomen: Soft, non-distended, non-tender  Skin: Staples on R scalp no drainage. Cool peripherally.  Neurology: Alert and oriented times three. Sensation intact  Psych: Affect normal. Poor insight.    Labs:                        9.1    15.35 )-----------( 272      ( 27 Mar 2019 10:01 )             28.9         124<L>  |  78<L>  |  22  ----------------------------<  64<L>  4.9   |  11<L>  |  1.08    Ca    7.0<L>      27 Mar 2019 07:06  Phos  4.2       Mg     2.0         TPro  7.2  /  Alb  3.1<L>  /  TBili  3.2<H>  /  DBili  x   /  AST  46<H>  /  ALT  29  /  AlkPhos  382<H>      Lactate, Blood: 3.5 mmol/L ( @ 13:10)  Lactate, Blood: 2.1 mmol/L ( @ 19:49)

## 2019-03-27 NOTE — PROGRESS NOTE ADULT - PROBLEM SELECTOR PLAN 1
- Will wean milrinone as she is refusing home infusion. Please decrease Milrinone to 0.25 mcg/ kg/ min  - Recommend removal of central line.  - Please change diuretics to lasix 40mg IV x 1 now. Will assess response to determine ongoing dosing.  - Please start marcel 25mg daily  - standing weights daily   - Please consult palliative regarding GOC as she has no other options - Will wean milrinone as she is refusing home infusion. Please decrease Milrinone to 0.125 mcg/ kg/ min  - No beta blocker at this time in setting of low cardiac output  - Will hold on ACE/ARB/ARNI in setting of marginal BP  - Recommend removal of central line.  - Recommend increasing diuretics.   - Continue marcel 25mg daily  - standing weights daily   - Please consult palliative regarding GOC as she has no other options - Will wean milrinone as she is refusing home infusion. Please decrease Milrinone to 0.125 mcg/ kg/ min now and turn off infusion at 0800 3/28  - Please send daily lactate  - No beta blocker at this time in setting of low cardiac output  - Will hold on ACE/ARB/ARNI in setting of marginal BP  - Recommend removal of central line.  - Please give lasix 80mg IV x 1 now and in the AM 3/28. Can send home on torsemide 80mg PO BID.  - Continue marcel 25mg daily  - standing weights daily   - Please consult palliative regarding GOC as she has no other options

## 2019-03-27 NOTE — PROVIDER CONTACT NOTE (CRITICAL VALUE NOTIFICATION) - ACTION/TREATMENT ORDERED:
Continue to monitor Ca.
Continue to monitor on tele. Supplement PO potasium.
NP made aware, no further interventions at this time.

## 2019-03-27 NOTE — CONSULT NOTE ADULT - SUBJECTIVE AND OBJECTIVE BOX
HPI:  64 yo F h/o severe cardiomyopathy c/b CHF w EF 10% s/p AICD on home milrinone infusion via PICC, afib on aspirin & elliquis, DM2, HTN, thyroid cancer s/p thyroidectomy presents for mechanical fall. Pt brought to ED for mechanical fall down 14 steps, denied loss of consciousness. Pt reports headache & back pain since the fall. She was able to ambulate after falling. On admission she complained of pain in her head and her left shin.    ED vitals were T 38.8, HR 86 - 133, BP 80/59 - 113/72, RR 15 - 18, SpO2 95 - 100%. CT Head, C-spine, thoracic spine, C/A/P performed demonstrating SDH. Pt was given vanc, azithro, aztreonam, tylenol, kaycentra, and 1 L NS. Seen by trauma surgery & neurosurgery, initially admitted to medicine, then became lethargic & hypotensive, started on levophed gtt and transferred to MICU. (20 Mar 2019 07:22)    Pt stabilized and weaned off levophed, transferred to floor on milrinone gtt.    PERTINENT PM/SXH:   Asthma  CHF (congestive heart failure)  DM (diabetes mellitus)  HTN (hypertension)  Thyroid ca    AICD (automatic cardioverter/defibrillator) present  S/P thyroidectomy    FAMILY HISTORY:  No pertinent family history in first degree relatives    ITEMS NOT CHECKED ARE NOT PRESENT    SOCIAL HISTORY:   Significant other/partner:   [x ]  Children: 4 dtrs  [ ]  Christianity/Spirituality:  Pentecostalism  Substance hx:  [ ]   Tobacco hx:  [ ]   Alcohol hx: [ ]   Home Opioid hx:  [ ] I-Stop Reference No:  Living Situation: [x ]Home  [ ]Long term care  [ ]Rehab [ ]Other    ADVANCE DIRECTIVES:    DNR No  MOLST  [ ]  Living Will  [ ]   DECISION MAKER(s):  [x ] Health Care Proxy(s)  [ ] Surrogate(s)  [ ] Guardian           Name(s): Phone Number(s): Tamra painting [phone]/ Crystal    BASELINE (I)ADL(s) (prior to admission):  [place]: [ ]Total  [x ] Moderate [ ]Dependent    Allergies  Isordil (Headache)  penicillin (Rash)    Intolerances    MEDICATIONS  (STANDING):  calcitriol   Capsule 0.5 MICROGram(s) Oral daily  calcium carbonate 1250 mG  + Vitamin D (OsCal 500 + D) 1 Tablet(s) Oral two times a day  cefTRIAXone   IVPB      cefTRIAXone   IVPB 2 Gram(s) IV Intermittent every 24 hours  chlorhexidine 4% Liquid 1 Application(s) Topical <User Schedule>  dextrose 5%. 1000 milliLiter(s) (50 mL/Hr) IV Continuous <Continuous>  dextrose 50% Injectable 12.5 Gram(s) IV Push once  dextrose 50% Injectable 25 Gram(s) IV Push once  dextrose 50% Injectable 25 Gram(s) IV Push once  gabapentin 100 milliGRAM(s) Oral every 12 hours  insulin lispro (HumaLOG) corrective regimen sliding scale   SubCutaneous three times a day before meals  insulin lispro (HumaLOG) corrective regimen sliding scale   SubCutaneous at bedtime  levothyroxine 175 MICROGram(s) Oral daily  magnesium oxide 400 milliGRAM(s) Oral three times a day with meals  milrinone Infusion 0.25 MICROgram(s)/kG/Min (5.992 mL/Hr) IV Continuous <Continuous>  rifaximin 550 milliGRAM(s) Oral two times a day  spironolactone 25 milliGRAM(s) Oral daily    MEDICATIONS  (PRN):  benzocaine 15 mG/menthol 3.6 mG Lozenge 1 Lozenge Oral every 4 hours PRN Sore Throat  dextrose 40% Gel 15 Gram(s) Oral once PRN Blood Glucose LESS THAN 70 milliGRAM(s)/deciliter  glucagon  Injectable 1 milliGRAM(s) IntraMuscular once PRN Glucose LESS THAN 70 milligrams/deciliter    PRESENT SYMPTOMS: [ ]Unable to obtain due to poor mentation   Source if other than patient:  [ ]Family   [ ]Team     Pain (Impact on QOL):    Location -         Minimal acceptable level (0-10 scale):                    Aggravating factors -  Quality -  Radiation -  Severity (0-10 scale) -    Timing -    PAIN AD Score:     [email or web address].[place].[email or web address]/[email or web address] (press ctrl +  left click to view)    Dyspnea:                           [x ]Mild [ ]Moderate [ ]Severe  Anxiety:                             [ ]Mild [ ]Moderate [ ]Severe  Fatigue:                             [x ]Mild [ ]Moderate [ ]Severe  Nausea:                            [ ]Mild [ ]Moderate [ ]Severe  Loss of appetite:              [x ]Mild [ ]Moderate [ ]Severe  Constipation:                    [ ]Mild [ ]Moderate [ ]Severe    Other Symptoms:  [x ]All other review of systems negative     Karnofsky Performance Score/Palliative Performance Status Version 2:     40    %    http://palliative.info/resource_material/[email or web address].pdf    PHYSICAL EXAM:  Vital Signs Last 24 Hrs  T(C): 36.5 (27 Mar 2019 05:00), Max: 36.7 (27 Mar 2019 01:00)  T(F): 97.7 (27 Mar 2019 05:00), Max: 98 (27 Mar 2019 01:00)  HR: 108 (27 Mar 2019 05:00) (103 - 108)  BP: 99/69 (27 Mar 2019 05:00) (96/64 - 101/72)  BP(mean): --  RR: 17 (27 Mar 2019 05:00) (17 - 18)  SpO2: 98% (27 Mar 2019 05:00) (98% - 100%) I&O's Summary    26 Mar 2019 07:01  -  27 Mar 2019 07:00  --------------------------------------------------------  IN: 452 mL / OUT: 350 mL / NET: 102 mL    GENERAL:  [x ]Alert  [x ]Oriented x 3   [ ]Lethargic  [ ]Cachexia  [ ]Unarousable  [x ]Verbal  [ ]Non-Verbal    Behavioral:   [ ] Anxiety  [ ] Delirium [ ] Agitation [ ] Other    HEENT:  [x ]Normal   [ ]Dry mouth   [ ]ET Tube/Trach  [ ]Oral lesions    PULMONARY:   [ ]Clear [ ]Tachypnea  [ ]Audible excessive secretions   [x]+cough  [ ]Rhonchi        [ ]Right [ ]Left [ ]Bilateral  [ ]Crackles        [ ]Right [ ]Left [ ]Bilateral  [ ]Wheezing     [ ]Right [ ]Left [ ]Bilateral    CARDIOVASCULAR:    [x ]Regular [ ]Irregular [ ]Tachy  [ ]Yimi [ ]Murmur [ ]Other    GASTROINTESTINAL:  [x ]Soft  [ ]Distended   [x ]+BS  [ ]Non tender [ ]Tender  [ ]PEG [ ]OGT/ NGT  Last BM:     GENITOURINARY:  [ ]Normal [ ] Incontinent   [ ]Oliguria/Anuria   [x]Gustafson external    MUSCULOSKELETAL:   [ ]Normal   [ ]Weakness  [x ]Bed/Wheelchair bound [ ]Edema    NEUROLOGIC:   [ x]No focal deficits  [ ] Cognitive impairment  [ ] Dysphagia [ ]Dysarthria [ ] Paresis [ ]Other     SKIN:   [x ]Normal   [ ]Pressure ulcer(s)  [ ]Rash    CRITICAL CARE:  [ ] Shock Present  [ ] Septic [ ]Cardiogenic [ ]Neurologic [ ]Hypovolemic  [ ]  Vasopressors [ ]  Inotropes   [ ] Respiratory failure present  [ ] Acute  [ ] Chronic [ ] Hypoxic  [ ] Hypercarbic [ ] Other  [ ] Other organ failure     LABS:                        9.1    15.35 )-----------( 272      ( 27 Mar 2019 10:01 )             28.9   03-27    124<L>  |  78<L>  |  22  ----------------------------<  64<L>  4.9   |  11<L>  |  1.08    Ca    7.0<L>      27 Mar 2019 07:06  Mg     2.0     03-26    TPro  7.2  /  Alb  3.1<L>  /  TBili  3.2<H>  /  DBili  x   /  AST  46<H>  /  ALT  29  /  AlkPhos  382<H>  03-25        RADIOLOGY & ADDITIONAL STUDIES:    PROTEIN CALORIE MALNUTRITION PRESENT: [ ] Yes [ ] No  [ ] PPSV2 < or = to 30% [ ] significant weight loss  [ ] poor nutritional intake [ ] catabolic state [ ] anasarca     Albumin, Serum: 3.1 g/dL (03-25-19 @ 19:49)  Artificial Nutrition [ ]     REFERRALS:   [ ]Chaplaincy  [ ] Hospice  [ ]Child Life  [ ]Social Work  [ ]Case management [ ]Holistic Therapy   Goals of Care Discussion Document:

## 2019-03-27 NOTE — PROGRESS NOTE ADULT - ASSESSMENT
64 yo F h/o severe non ischemic cardiomyopathy (EF 10%, LVIDd 6.1 cm) identified in 2013, moderate-severe MR, severe TR, s/p AICD on home milrinone infusion via PICC, afib on aspirin & elliquis, DM2, HTN, thyroid cancer s/p thyroidectomy presents with fall, found to have stable subdural hematoma, and as well as Klebsiella bacteremia likely related to PICC line infection.     On exam patient appears volume up with elevated JVP. Weight up 0.2kg from yesterday despite dose of IV diuretics. Patient is a poor candidate for advanced therapies given lack of insight despite repeated education regarding her condition and treatment recommendations. Her renal function is normal although SCr is uptrending. SBP 90-100s, not on neurohormonal antagonists. She is afebrile without leukocytosis. Last BCx 3/22 with NGTD. Lactate noted to be elevated and uptrending yesterday afternoon at 3.7 with a central venous oxygen saturation of 38% (CI 1.4) on milrinone 0.5mcg/kg/min. She is not adequately supported on high dose of inotropes, however despite extensive conversation she is refusing PICC line placement and home milrinone due to concern for recurrent infection, thus it is being weaned. She verbalized understanding of discussed risks of discontinuing milrinone including further decline and potentially death. 62 yo F h/o severe non ischemic cardiomyopathy (EF 10%, LVIDd 6.1 cm) identified in 2013, moderate-severe MR, severe TR, s/p AICD on home milrinone infusion via PICC, afib on aspirin & elliquis, DM2, HTN, thyroid cancer s/p thyroidectomy presents with fall, found to have stable subdural hematoma, and as well as Klebsiella bacteremia likely related to PICC line infection.     On exam patient appears volume up with elevated JVP. Weight up 0.2kg from yesterday despite dose of IV diuretics. Patient is a poor candidate for advanced therapies given lack of insight despite repeated education regarding her condition and treatment recommendations. Her renal function is normal although SCr is uptrending. SBP 90-100s, not on neurohormonal antagonists. She is afebrile without leukocytosis. Last BCx 3/22 with NGTD. Lactate noted to be elevated and uptrending yesterday afternoon at 3.7 with a central venous oxygen saturation of 38% (CI 1.4) on milrinone 0.5mcg/kg/min. She is not adequately supported on high dose of inotropes, however despite extensive conversation she is refusing PICC line placement and home milrinone due to concern for recurrent infection, thus it is being weaned. She verbalized understanding of discussed risks of discontinuing milrinone including further decline and potentially death. She can be discharged home from our perspective tomorrow afternoon.

## 2019-03-27 NOTE — PROVIDER CONTACT NOTE (CRITICAL VALUE NOTIFICATION) - ASSESSMENT
stable in no distress, VSS. afebrile.  primacor gtt in progress and tolerating well.  On tele without events. No sob, no palpitations, denies cp.

## 2019-03-27 NOTE — PROGRESS NOTE ADULT - SUBJECTIVE AND OBJECTIVE BOX
INTERVAL HPI/OVERNIGHT EVENTS: i feel fine. When can I go home.   Vital Signs Last 24 Hrs  T(C): 36.5 (27 Mar 2019 05:00), Max: 36.7 (27 Mar 2019 01:00)  T(F): 97.7 (27 Mar 2019 05:00), Max: 98 (27 Mar 2019 01:00)  HR: 108 (27 Mar 2019 05:00) (103 - 108)  BP: 99/69 (27 Mar 2019 05:00) (96/64 - 101/72)  BP(mean): --  RR: 17 (27 Mar 2019 05:00) (17 - 18)  SpO2: 98% (27 Mar 2019 05:00) (98% - 100%)  I&O's Summary    26 Mar 2019 07:01  -  27 Mar 2019 07:00  --------------------------------------------------------  IN: 452 mL / OUT: 350 mL / NET: 102 mL      MEDICATIONS  (STANDING):  calcitriol   Capsule 0.5 MICROGram(s) Oral daily  calcium carbonate 1250 mG  + Vitamin D (OsCal 500 + D) 1 Tablet(s) Oral two times a day  cefTRIAXone   IVPB      cefTRIAXone   IVPB 2 Gram(s) IV Intermittent every 24 hours  chlorhexidine 4% Liquid 1 Application(s) Topical <User Schedule>  dextrose 5%. 1000 milliLiter(s) (50 mL/Hr) IV Continuous <Continuous>  dextrose 50% Injectable 12.5 Gram(s) IV Push once  dextrose 50% Injectable 25 Gram(s) IV Push once  dextrose 50% Injectable 25 Gram(s) IV Push once  gabapentin 100 milliGRAM(s) Oral every 12 hours  insulin lispro (HumaLOG) corrective regimen sliding scale   SubCutaneous three times a day before meals  insulin lispro (HumaLOG) corrective regimen sliding scale   SubCutaneous at bedtime  levothyroxine 175 MICROGram(s) Oral daily  magnesium oxide 400 milliGRAM(s) Oral three times a day with meals  milrinone Infusion 0.25 MICROgram(s)/kG/Min (5.992 mL/Hr) IV Continuous <Continuous>  rifaximin 550 milliGRAM(s) Oral two times a day  spironolactone 25 milliGRAM(s) Oral daily    MEDICATIONS  (PRN):  benzocaine 15 mG/menthol 3.6 mG Lozenge 1 Lozenge Oral every 4 hours PRN Sore Throat  dextrose 40% Gel 15 Gram(s) Oral once PRN Blood Glucose LESS THAN 70 milliGRAM(s)/deciliter  glucagon  Injectable 1 milliGRAM(s) IntraMuscular once PRN Glucose LESS THAN 70 milligrams/deciliter    LABS:                        9.1    15.35 )-----------( 272      ( 27 Mar 2019 10:01 )             28.9     03-27    124<L>  |  78<L>  |  22  ----------------------------<  64<L>  4.9   |  11<L>  |  1.08    Ca    7.0<L>      27 Mar 2019 07:06  Mg     2.0     03-26    TPro  7.2  /  Alb  3.1<L>  /  TBili  3.2<H>  /  DBili  x   /  AST  46<H>  /  ALT  29  /  AlkPhos  382<H>  03-25        CAPILLARY BLOOD GLUCOSE      POCT Blood Glucose.: 273 mg/dL (27 Mar 2019 11:52)  POCT Blood Glucose.: 79 mg/dL (27 Mar 2019 07:48)  POCT Blood Glucose.: 94 mg/dL (26 Mar 2019 21:41)  POCT Blood Glucose.: 142 mg/dL (26 Mar 2019 17:14)          REVIEW OF SYSTEMS:  CONSTITUTIONAL: No fever, weight loss, or fatigue  EYES: No eye pain, visual disturbances, or discharge  ENMT:  No difficulty hearing, tinnitus, vertigo; No sinus or throat pain  NECK: No pain or stiffness  RESPIRATORY: No cough, wheezing, chills or hemoptysis; No shortness of breath  CARDIOVASCULAR: No chest pain, palpitations, dizziness, or leg swelling  GASTROINTESTINAL: No abdominal or epigastric pain. No nausea, vomiting, or hematemesis; No diarrhea or constipation. No melena or hematochezia.  GENITOURINARY: No dysuria, frequency, hematuria, or incontinence  NEUROLOGICAL: No headaches, memory loss, loss of strength, numbness, or tremors      Consultant(s) Notes Reviewed:  [x ] YES  [ ] NO    PHYSICAL EXAM:  GENERAL: NAD, well-groomed, well-developed ,not in any distress ,  HEAD:  Atraumatic, Normocephalic  EYES: EOMI, PERRLA, conjunctiva and sclera clear  NECK:JVD++  NERVOUS SYSTEM:  Alert & Oriented X3, No focal deficit   CHEST/LUNG: Good air entry bilateral with no  rales, rhonchi, wheezing, or rubs  HEART: Regular rate and rhythm; No murmurs, rubs, or gallops  ABDOMEN: Soft, Nontender, Nondistended; Bowel sounds present  EXTREMITIES:  2+ Peripheral Pulses, No clubbing, cyanosis, or edema    Care Discussed with Consultants/Other Providers [ x] YES  [ ] NO

## 2019-03-27 NOTE — PROGRESS NOTE ADULT - PROBLEM SELECTOR PLAN 2
- On ceftriaxone. Day 7 of IV abx with plan for 10-14 days. May potentially switch to PO quinolones after day 7 per ID.  - Appreciate ID input.

## 2019-03-28 ENCOUNTER — TRANSCRIPTION ENCOUNTER (OUTPATIENT)
Age: 64
End: 2019-03-28

## 2019-03-28 ENCOUNTER — INBOUND DOCUMENT (OUTPATIENT)
Age: 64
End: 2019-03-28

## 2019-03-28 VITALS
DIASTOLIC BLOOD PRESSURE: 76 MMHG | HEART RATE: 93 BPM | SYSTOLIC BLOOD PRESSURE: 108 MMHG | OXYGEN SATURATION: 99 % | TEMPERATURE: 97 F | RESPIRATION RATE: 18 BRPM

## 2019-03-28 DIAGNOSIS — F43.20 ADJUSTMENT DISORDER, UNSPECIFIED: ICD-10-CM

## 2019-03-28 LAB
ANION GAP SERPL CALC-SCNC: 27 MMOL/L — HIGH (ref 5–17)
BUN SERPL-MCNC: 29 MG/DL — HIGH (ref 7–23)
CALCIUM SERPL-MCNC: 7.3 MG/DL — LOW (ref 8.4–10.5)
CHLORIDE SERPL-SCNC: 79 MMOL/L — LOW (ref 96–108)
CO2 SERPL-SCNC: 17 MMOL/L — LOW (ref 22–31)
CREAT SERPL-MCNC: 1.18 MG/DL — SIGNIFICANT CHANGE UP (ref 0.5–1.3)
GLUCOSE BLDC GLUCOMTR-MCNC: 130 MG/DL — HIGH (ref 70–99)
GLUCOSE BLDC GLUCOMTR-MCNC: 165 MG/DL — HIGH (ref 70–99)
GLUCOSE BLDC GLUCOMTR-MCNC: 56 MG/DL — LOW (ref 70–99)
GLUCOSE BLDC GLUCOMTR-MCNC: 59 MG/DL — LOW (ref 70–99)
GLUCOSE BLDC GLUCOMTR-MCNC: 62 MG/DL — LOW (ref 70–99)
GLUCOSE BLDC GLUCOMTR-MCNC: 65 MG/DL — LOW (ref 70–99)
GLUCOSE BLDC GLUCOMTR-MCNC: 73 MG/DL — SIGNIFICANT CHANGE UP (ref 70–99)
GLUCOSE SERPL-MCNC: 134 MG/DL — HIGH (ref 70–99)
LACTATE SERPL-SCNC: 8.6 MMOL/L — CRITICAL HIGH (ref 0.7–2)
MAGNESIUM SERPL-MCNC: 1.9 MG/DL — SIGNIFICANT CHANGE UP (ref 1.6–2.6)
POTASSIUM SERPL-MCNC: 5.4 MMOL/L — HIGH (ref 3.5–5.3)
POTASSIUM SERPL-SCNC: 5.4 MMOL/L — HIGH (ref 3.5–5.3)
SODIUM SERPL-SCNC: 123 MMOL/L — LOW (ref 135–145)

## 2019-03-28 PROCEDURE — 82803 BLOOD GASES ANY COMBINATION: CPT

## 2019-03-28 PROCEDURE — 12001 RPR S/N/AX/GEN/TRNK 2.5CM/<: CPT | Mod: RT

## 2019-03-28 PROCEDURE — 93005 ELECTROCARDIOGRAM TRACING: CPT | Mod: XU

## 2019-03-28 PROCEDURE — 93306 TTE W/DOPPLER COMPLETE: CPT

## 2019-03-28 PROCEDURE — 99285 EMERGENCY DEPT VISIT HI MDM: CPT | Mod: 25

## 2019-03-28 PROCEDURE — 82435 ASSAY OF BLOOD CHLORIDE: CPT

## 2019-03-28 PROCEDURE — 87150 DNA/RNA AMPLIFIED PROBE: CPT

## 2019-03-28 PROCEDURE — 82550 ASSAY OF CK (CPK): CPT

## 2019-03-28 PROCEDURE — 82330 ASSAY OF CALCIUM: CPT

## 2019-03-28 PROCEDURE — 85014 HEMATOCRIT: CPT

## 2019-03-28 PROCEDURE — 85730 THROMBOPLASTIN TIME PARTIAL: CPT

## 2019-03-28 PROCEDURE — 84443 ASSAY THYROID STIM HORMONE: CPT

## 2019-03-28 PROCEDURE — 84540 ASSAY OF URINE/UREA-N: CPT

## 2019-03-28 PROCEDURE — 72125 CT NECK SPINE W/O DYE: CPT

## 2019-03-28 PROCEDURE — 83690 ASSAY OF LIPASE: CPT

## 2019-03-28 PROCEDURE — 87186 SC STD MICRODIL/AGAR DIL: CPT

## 2019-03-28 PROCEDURE — 82570 ASSAY OF URINE CREATININE: CPT

## 2019-03-28 PROCEDURE — 70450 CT HEAD/BRAIN W/O DYE: CPT

## 2019-03-28 PROCEDURE — 87581 M.PNEUMON DNA AMP PROBE: CPT

## 2019-03-28 PROCEDURE — 96375 TX/PRO/DX INJ NEW DRUG ADDON: CPT | Mod: XU

## 2019-03-28 PROCEDURE — 86900 BLOOD TYPING SEROLOGIC ABO: CPT

## 2019-03-28 PROCEDURE — 86850 RBC ANTIBODY SCREEN: CPT

## 2019-03-28 PROCEDURE — 80053 COMPREHEN METABOLIC PANEL: CPT

## 2019-03-28 PROCEDURE — 85027 COMPLETE CBC AUTOMATED: CPT

## 2019-03-28 PROCEDURE — 90471 IMMUNIZATION ADMIN: CPT

## 2019-03-28 PROCEDURE — 83605 ASSAY OF LACTIC ACID: CPT

## 2019-03-28 PROCEDURE — 84132 ASSAY OF SERUM POTASSIUM: CPT

## 2019-03-28 PROCEDURE — 71260 CT THORAX DX C+: CPT

## 2019-03-28 PROCEDURE — 85610 PROTHROMBIN TIME: CPT

## 2019-03-28 PROCEDURE — 87798 DETECT AGENT NOS DNA AMP: CPT

## 2019-03-28 PROCEDURE — 82553 CREATINE MB FRACTION: CPT

## 2019-03-28 PROCEDURE — 81003 URINALYSIS AUTO W/O SCOPE: CPT

## 2019-03-28 PROCEDURE — 84145 PROCALCITONIN (PCT): CPT

## 2019-03-28 PROCEDURE — 99233 SBSQ HOSP IP/OBS HIGH 50: CPT

## 2019-03-28 PROCEDURE — 96365 THER/PROPH/DIAG IV INF INIT: CPT | Mod: XU

## 2019-03-28 PROCEDURE — 87486 CHLMYD PNEUM DNA AMP PROBE: CPT

## 2019-03-28 PROCEDURE — 86901 BLOOD TYPING SEROLOGIC RH(D): CPT

## 2019-03-28 PROCEDURE — 83735 ASSAY OF MAGNESIUM: CPT

## 2019-03-28 PROCEDURE — 82962 GLUCOSE BLOOD TEST: CPT

## 2019-03-28 PROCEDURE — 82565 ASSAY OF CREATININE: CPT

## 2019-03-28 PROCEDURE — 87449 NOS EACH ORGANISM AG IA: CPT

## 2019-03-28 PROCEDURE — 80048 BASIC METABOLIC PNL TOTAL CA: CPT

## 2019-03-28 PROCEDURE — 84100 ASSAY OF PHOSPHORUS: CPT

## 2019-03-28 PROCEDURE — 87040 BLOOD CULTURE FOR BACTERIA: CPT

## 2019-03-28 PROCEDURE — 71045 X-RAY EXAM CHEST 1 VIEW: CPT

## 2019-03-28 PROCEDURE — 90715 TDAP VACCINE 7 YRS/> IM: CPT

## 2019-03-28 PROCEDURE — 99222 1ST HOSP IP/OBS MODERATE 55: CPT

## 2019-03-28 PROCEDURE — 83935 ASSAY OF URINE OSMOLALITY: CPT

## 2019-03-28 PROCEDURE — 83930 ASSAY OF BLOOD OSMOLALITY: CPT

## 2019-03-28 PROCEDURE — 82947 ASSAY GLUCOSE BLOOD QUANT: CPT

## 2019-03-28 PROCEDURE — 84480 ASSAY TRIIODOTHYRONINE (T3): CPT

## 2019-03-28 PROCEDURE — 84295 ASSAY OF SERUM SODIUM: CPT

## 2019-03-28 PROCEDURE — 87633 RESP VIRUS 12-25 TARGETS: CPT

## 2019-03-28 PROCEDURE — 84484 ASSAY OF TROPONIN QUANT: CPT

## 2019-03-28 PROCEDURE — 84436 ASSAY OF TOTAL THYROXINE: CPT

## 2019-03-28 PROCEDURE — 93308 TTE F-UP OR LMTD: CPT

## 2019-03-28 PROCEDURE — 84300 ASSAY OF URINE SODIUM: CPT

## 2019-03-28 PROCEDURE — 84560 ASSAY OF URINE/URIC ACID: CPT

## 2019-03-28 PROCEDURE — 82140 ASSAY OF AMMONIA: CPT

## 2019-03-28 PROCEDURE — 74177 CT ABD & PELVIS W/CONTRAST: CPT

## 2019-03-28 PROCEDURE — 83036 HEMOGLOBIN GLYCOSYLATED A1C: CPT

## 2019-03-28 PROCEDURE — 97161 PT EVAL LOW COMPLEX 20 MIN: CPT

## 2019-03-28 PROCEDURE — 76937 US GUIDE VASCULAR ACCESS: CPT

## 2019-03-28 RX ORDER — CIPROFLOXACIN LACTATE 400MG/40ML
1 VIAL (ML) INTRAVENOUS
Qty: 7 | Refills: 0 | OUTPATIENT
Start: 2019-03-28 | End: 2019-04-03

## 2019-03-28 RX ORDER — BUMETANIDE 0.25 MG/ML
2 INJECTION INTRAMUSCULAR; INTRAVENOUS
Qty: 120 | Refills: 0 | COMMUNITY

## 2019-03-28 RX ORDER — ASPIRIN/CALCIUM CARB/MAGNESIUM 324 MG
1 TABLET ORAL
Qty: 0 | Refills: 0 | COMMUNITY

## 2019-03-28 RX ORDER — DEXTROSE 50 % IN WATER 50 %
25 SYRINGE (ML) INTRAVENOUS ONCE
Qty: 0 | Refills: 0 | Status: COMPLETED | OUTPATIENT
Start: 2019-03-28 | End: 2019-03-28

## 2019-03-28 RX ADMIN — GABAPENTIN 100 MILLIGRAM(S): 400 CAPSULE ORAL at 06:33

## 2019-03-28 RX ADMIN — GABAPENTIN 100 MILLIGRAM(S): 400 CAPSULE ORAL at 18:23

## 2019-03-28 RX ADMIN — Medication 25 MILLILITER(S): at 08:41

## 2019-03-28 RX ADMIN — CEFTRIAXONE 100 GRAM(S): 500 INJECTION, POWDER, FOR SOLUTION INTRAMUSCULAR; INTRAVENOUS at 11:09

## 2019-03-28 RX ADMIN — MILRINONE LACTATE 3 MICROGRAM(S)/KG/MIN: 1 INJECTION, SOLUTION INTRAVENOUS at 06:41

## 2019-03-28 RX ADMIN — Medication 1 TABLET(S): at 18:23

## 2019-03-28 RX ADMIN — CALCITRIOL 0.5 MICROGRAM(S): 0.5 CAPSULE ORAL at 11:09

## 2019-03-28 RX ADMIN — Medication 80 MILLIGRAM(S): at 12:33

## 2019-03-28 RX ADMIN — SPIRONOLACTONE 25 MILLIGRAM(S): 25 TABLET, FILM COATED ORAL at 06:33

## 2019-03-28 RX ADMIN — Medication 175 MICROGRAM(S): at 06:33

## 2019-03-28 RX ADMIN — Medication 1 TABLET(S): at 06:33

## 2019-03-28 NOTE — BEHAVIORAL HEALTH ASSESSMENT NOTE - HPI (INCLUDE ILLNESS QUALITY, SEVERITY, DURATION, TIMING, CONTEXT, MODIFYING FACTORS, ASSOCIATED SIGNS AND SYMPTOMS)
64 yo female, domiciled with family, no pphx, retired , h/o severe cardiomyopathy c/b CHF w EF 10% s/p AICD on home milrinone infusion via PICC, afib on aspirin & elliquis, DM2, HTN, thyroid cancer s/p thyroidectomy presents for mechanical fall, complicated hospital course, Klebsiella bacteremia, PICC removal, now pt refusing milrinone, lasix, wants to leave the hospital, consulted for capacity to refuse meds-milrinone, lasix. Pt was initially refusing to speak to psychiatry, stated it was not necessary. Pt later agreed after medical team explained the role of psychiatry in addressing capacity. Pt repeatedly stated she does not need to take milrinione for her heart failure. She states she has been dealing with this since 2013, knows what her body needs. Pt wants to get a second opinion, didn't want to disclose if she was going to another hospital instead. Pt was unable to explain risks/benefits of treatment. Pt states she did not refuse lasix iv today. Pt denies depression, anxiety, psychosis, diego. Pt denies si/hi. Pt adamant about leaving the hospital, wants to find a , states she is being harassed by the medical team.

## 2019-03-28 NOTE — BEHAVIORAL HEALTH ASSESSMENT NOTE - NSBHCHARTREVIEWVS_PSY_A_CORE FT
Vital Signs Last 24 Hrs  T(C): 36.4 (28 Mar 2019 04:25), Max: 36.4 (27 Mar 2019 17:46)  T(F): 97.5 (28 Mar 2019 04:25), Max: 97.6 (27 Mar 2019 17:46)  HR: 100 (28 Mar 2019 06:38) (100 - 105)  BP: 91/64 (28 Mar 2019 06:38) (90/65 - 101/69)  BP(mean): --  RR: 18 (28 Mar 2019 06:38) (17 - 18)  SpO2: 100% (28 Mar 2019 06:38) (98% - 100%)

## 2019-03-28 NOTE — PROGRESS NOTE ADULT - PROBLEM SELECTOR PLAN 1
- No beta blocker at this time in setting of low cardiac output  - Will hold on ACE/ARB/ARNI in setting of marginal BP  - Recommend removal of central line.  - Please give lasix 80mg IV x 1 now and in the AM 3/28. Can send home on torsemide 80mg PO BID.  - Discontinue spironolactone in setting of hyperkalemia  - standing weights daily   - Appreciate palliative consult - Now off inotropes. No beta blocker at this time in setting of low cardiac output  - Will hold on ACE/ARB/ARNI in setting of marginal BP  - Recommend removal of central line.  - Start torsemide 80mg PO BID  - Discontinue spironolactone in setting of hyperkalemia  - standing weights daily   - Appreciate palliative consult

## 2019-03-28 NOTE — PROGRESS NOTE ADULT - SUBJECTIVE AND OBJECTIVE BOX
pt seen and examined, no complaints, no events on tele     benzocaine 15 mG/menthol 3.6 mG Lozenge 1 Lozenge Oral every 4 hours PRN  calcitriol   Capsule 0.5 MICROGram(s) Oral daily  calcium carbonate 1250 mG  + Vitamin D (OsCal 500 + D) 1 Tablet(s) Oral two times a day  cefTRIAXone   IVPB      cefTRIAXone   IVPB 2 Gram(s) IV Intermittent every 24 hours  chlorhexidine 4% Liquid 1 Application(s) Topical <User Schedule>  dextrose 40% Gel 15 Gram(s) Oral once PRN  dextrose 5%. 1000 milliLiter(s) IV Continuous <Continuous>  dextrose 50% Injectable 12.5 Gram(s) IV Push once  dextrose 50% Injectable 25 Gram(s) IV Push once  dextrose 50% Injectable 25 Gram(s) IV Push once  enoxaparin Injectable 40 milliGRAM(s) SubCutaneous every 24 hours  furosemide   Injectable 80 milliGRAM(s) IV Push once  gabapentin 100 milliGRAM(s) Oral every 12 hours  glucagon  Injectable 1 milliGRAM(s) IntraMuscular once PRN  insulin lispro (HumaLOG) corrective regimen sliding scale   SubCutaneous three times a day before meals  insulin lispro (HumaLOG) corrective regimen sliding scale   SubCutaneous at bedtime  levothyroxine 175 MICROGram(s) Oral daily  magnesium oxide 400 milliGRAM(s) Oral three times a day with meals  milrinone Infusion 0.125 MICROgram(s)/kG/Min IV Continuous <Continuous>  rifaximin 550 milliGRAM(s) Oral two times a day  spironolactone 25 milliGRAM(s) Oral daily                            9.1    15.35 )-----------( 272      ( 27 Mar 2019 10:01 )             28.9       Hemoglobin: 9.1 g/dL (03-27 @ 10:01)  Hemoglobin: 8.8 g/dL (03-26 @ 09:13)  Hemoglobin: 9.0 g/dL (03-25 @ 22:29)  Hemoglobin: 8.8 g/dL (03-24 @ 08:58)  Hemoglobin: 9.4 g/dL (03-23 @ 19:28)      03-27    124<L>  |  78<L>  |  22  ----------------------------<  64<L>  4.9   |  11<L>  |  1.08    Ca    7.0<L>      27 Mar 2019 07:06  Mg     2.0     03-26      Creatinine Trend: 1.08<--, 0.83<--, 0.77<--, 0.71<--, 0.76<--, 0.74<--    COAGS:           T(C): 36.4 (03-28-19 @ 04:25), Max: 36.4 (03-27-19 @ 13:00)  HR: 104 (03-28-19 @ 04:25) (102 - 105)  BP: 101/69 (03-28-19 @ 04:25) (90/65 - 108/71)  RR: 18 (03-28-19 @ 04:25) (17 - 18)  SpO2: 100% (03-28-19 @ 04:25) (98% - 100%)  Wt(kg): --    I&O's Summary    26 Mar 2019 07:01  -  27 Mar 2019 07:00  --------------------------------------------------------  IN: 452 mL / OUT: 350 mL / NET: 102 mL    27 Mar 2019 07:01  -  28 Mar 2019 05:53  --------------------------------------------------------  IN: 785.9 mL / OUT: 300 mL / NET: 485.9 mL      	  Lymphatic: No lymphadenopathy trace edema in LE bilaterally   Cardiovascular: Normal S1 S2, + JVD, No murmurs , Peripheral pulses palpable 2+ bilaterally  Respiratory: Lungs clear to auscultation  Gastrointestinal:  Soft, Non-tender, + BS	  Skin: No rashes, No ecchymoses, No cyanosis, warm to touch  Psychiatry:  Mood & affect appropriate      TELEMETRY: Afib       ECG: < from: 12 Lead ECG (03.20.19 @ 05:58) >  WIDE QRS TACHYCARDIA  LEFT AXIS DEVIATION  RIGHT BUNDLE BRANCH BLOCK  LEFT VENTRICULAR HYPERTROPHY WITH REPOLARIZATION ABNORMALITY  INFERIOR INFARCT , AGE UNDETERMINED  ABNORMAL ECG    < end of copied text >    	  RADIOLOGY:  OTHER:     DIAGNOSTIC TESTING:  [ ] Echocardiogram: < from: TTE with Doppler (w/Cont) (11.07.18 @ 05:53) >  Conclusions:  1. Tethered mitral valve leaflets with normal opening.  Mitral annular calcification and thickened  mitral leaflet  tips Moderate mitral regurgitation.  2. Calcified trileaflet aortic valve with normal opening.  3. Moderately dilated left atrium.  LA volume index = 43  cc/m2.  4. Eccentric left ventricular hypertrophy (dilated left  ventricle with normal relative wall thickness).  5. Severe global left ventricularsystolic dysfunction.  Endocardial visualization enhanced with intravenous  injection of Ultrasonic Enhancing Agent (Definity). No LV  thrombus.  Septal flattening consistent with right  ventricular overload.  6. Severe diastolic dysfunction with elevated filling  pressures  7. Severe right atrial enlargement.  8. Right ventricular enlargement with decreased right  ventricular systolic function.  A device wire is noted in  the right heart.  9. Dilated tricuspid annulus with malcoaptation of  tricuspid leaflets. Severe tricuspid regurgitation.  *** No previous Echo exam.    < end of copied text >    [ ]  Catheterization:  [ ] Stress Test:    	         ASSESSMENT/PLAN:  62 yo F with history of severe NICM s/p ICD on home milrinone, AF on ac with eliquis, HTN, DM, thyroid CA s/p thyroidectomy who is being seen for heart failure and cardiomyopathy.    Cont ABX  Primacor decreased to 0.125 mcg yesterday   hold ACE at present , monitor creatine ,    Cont to hold A/C and antiplts meds  at present , would need to restart once cleared by Neuro sx   If pt cant be on A/C , then the pt need a outpatient LOLITA occlusion ( watchman)    GI / DVT prophylaxis,  keep K>4, mag >2.0   Heart failure follow up   Neurosx following for SDH   D/W Dr Muller pt seen and examined, no complaints, no events on tele     benzocaine 15 mG/menthol 3.6 mG Lozenge 1 Lozenge Oral every 4 hours PRN  calcitriol   Capsule 0.5 MICROGram(s) Oral daily  calcium carbonate 1250 mG  + Vitamin D (OsCal 500 + D) 1 Tablet(s) Oral two times a day  cefTRIAXone   IVPB      cefTRIAXone   IVPB 2 Gram(s) IV Intermittent every 24 hours  chlorhexidine 4% Liquid 1 Application(s) Topical <User Schedule>  dextrose 40% Gel 15 Gram(s) Oral once PRN  dextrose 5%. 1000 milliLiter(s) IV Continuous <Continuous>  dextrose 50% Injectable 12.5 Gram(s) IV Push once  dextrose 50% Injectable 25 Gram(s) IV Push once  dextrose 50% Injectable 25 Gram(s) IV Push once  enoxaparin Injectable 40 milliGRAM(s) SubCutaneous every 24 hours  furosemide   Injectable 80 milliGRAM(s) IV Push once  gabapentin 100 milliGRAM(s) Oral every 12 hours  glucagon  Injectable 1 milliGRAM(s) IntraMuscular once PRN  insulin lispro (HumaLOG) corrective regimen sliding scale   SubCutaneous three times a day before meals  insulin lispro (HumaLOG) corrective regimen sliding scale   SubCutaneous at bedtime  levothyroxine 175 MICROGram(s) Oral daily  magnesium oxide 400 milliGRAM(s) Oral three times a day with meals  milrinone Infusion 0.125 MICROgram(s)/kG/Min IV Continuous <Continuous>  rifaximin 550 milliGRAM(s) Oral two times a day  spironolactone 25 milliGRAM(s) Oral daily                            9.1    15.35 )-----------( 272      ( 27 Mar 2019 10:01 )             28.9       Hemoglobin: 9.1 g/dL (03-27 @ 10:01)  Hemoglobin: 8.8 g/dL (03-26 @ 09:13)  Hemoglobin: 9.0 g/dL (03-25 @ 22:29)  Hemoglobin: 8.8 g/dL (03-24 @ 08:58)  Hemoglobin: 9.4 g/dL (03-23 @ 19:28)      03-27    124<L>  |  78<L>  |  22  ----------------------------<  64<L>  4.9   |  11<L>  |  1.08    Ca    7.0<L>      27 Mar 2019 07:06  Mg     2.0     03-26      Creatinine Trend: 1.08<--, 0.83<--, 0.77<--, 0.71<--, 0.76<--, 0.74<--    COAGS:           T(C): 36.4 (03-28-19 @ 04:25), Max: 36.4 (03-27-19 @ 13:00)  HR: 104 (03-28-19 @ 04:25) (102 - 105)  BP: 101/69 (03-28-19 @ 04:25) (90/65 - 108/71)  RR: 18 (03-28-19 @ 04:25) (17 - 18)  SpO2: 100% (03-28-19 @ 04:25) (98% - 100%)  Wt(kg): --    I&O's Summary    26 Mar 2019 07:01  -  27 Mar 2019 07:00  --------------------------------------------------------  IN: 452 mL / OUT: 350 mL / NET: 102 mL    27 Mar 2019 07:01  -  28 Mar 2019 05:53  --------------------------------------------------------  IN: 785.9 mL / OUT: 300 mL / NET: 485.9 mL      	  Lymphatic: No lymphadenopathy trace edema in LE bilaterally   Cardiovascular: Normal S1 S2, + JVD, No murmurs , Peripheral pulses palpable 2+ bilaterally  Respiratory: Lungs clear to auscultation  Gastrointestinal:  Soft, Non-tender, + BS	  Skin: No rashes, No ecchymoses, No cyanosis, warm to touch  Psychiatry:  Mood & affect appropriate      TELEMETRY: Afib       ECG: < from: 12 Lead ECG (03.20.19 @ 05:58) >  WIDE QRS TACHYCARDIA  LEFT AXIS DEVIATION  RIGHT BUNDLE BRANCH BLOCK  LEFT VENTRICULAR HYPERTROPHY WITH REPOLARIZATION ABNORMALITY  INFERIOR INFARCT , AGE UNDETERMINED  ABNORMAL ECG    < end of copied text >    	  RADIOLOGY:  OTHER:     DIAGNOSTIC TESTING:  [ ] Echocardiogram: < from: TTE with Doppler (w/Cont) (11.07.18 @ 05:53) >  Conclusions:  1. Tethered mitral valve leaflets with normal opening.  Mitral annular calcification and thickened  mitral leaflet  tips Moderate mitral regurgitation.  2. Calcified trileaflet aortic valve with normal opening.  3. Moderately dilated left atrium.  LA volume index = 43  cc/m2.  4. Eccentric left ventricular hypertrophy (dilated left  ventricle with normal relative wall thickness).  5. Severe global left ventricularsystolic dysfunction.  Endocardial visualization enhanced with intravenous  injection of Ultrasonic Enhancing Agent (Definity). No LV  thrombus.  Septal flattening consistent with right  ventricular overload.  6. Severe diastolic dysfunction with elevated filling  pressures  7. Severe right atrial enlargement.  8. Right ventricular enlargement with decreased right  ventricular systolic function.  A device wire is noted in  the right heart.  9. Dilated tricuspid annulus with malcoaptation of  tricuspid leaflets. Severe tricuspid regurgitation.  *** No previous Echo exam.    < end of copied text >    [ ]  Catheterization:  [ ] Stress Test:    	         ASSESSMENT/PLAN:  62 yo F with history of severe NICM s/p ICD on home milrinone, AF on ac with eliquis, HTN, DM, thyroid CA s/p thyroidectomy who is being seen for heart failure and cardiomyopathy.    Cont ABX  Primacor decreased to 0.125 mcg yesterday   hold ACE at present , monitor creatine ,    Cont to hold A/C and antiplts meds  at present , would need to restart once cleared by Neuro sx   If pt cant be on A/C , then the pt need a outpatient LOLITA occlusion ( watchman)    GI / DVT prophylaxis,  keep K>4, mag >2.0   Heart failure follow up   Neurosx following for SDH

## 2019-03-28 NOTE — DISCHARGE NOTE PROVIDER - REASON FOR ADMISSION
mechanical fall - infection in blood and PICC line treated w/ 2 weeks of antibiotics, subdural hemorrhage post fall which is stable, severe cardiomyopathy wit ejection fraction 10% - patient refusing Primacor drip which is essential for CHF treatment so daughter signing patient out as against medical advice, chronic atrial fibrillation - no anticoagulation due to subdural hematoma mechanical fall - mechanical fall - infection in blood and PICC line treated w/ 2 weeks of antibiotics, subdural hemorrhage post fall which is stable, severe cardiomyopathy wit ejection fraction 10% - patient refusing Primacor drip which is essential for CHF treatment so daughter signing patient out as against medical advice, chronic atrial fibrillation - no anticoagulation due to subdural hematoma

## 2019-03-28 NOTE — PROGRESS NOTE ADULT - SUBJECTIVE AND OBJECTIVE BOX
Subjective:  - Milrinone stopped this morning  - She reports feeling very fatigued this morning, states she didn't sleep well due to her roommate being up.  - Denies SOB, orthopnea, CP, abdominal pain.    Medications:  benzocaine 15 mG/menthol 3.6 mG Lozenge 1 Lozenge Oral every 4 hours PRN  calcitriol   Capsule 0.5 MICROGram(s) Oral daily  calcium carbonate 1250 mG  + Vitamin D (OsCal 500 + D) 1 Tablet(s) Oral two times a day  cefTRIAXone   IVPB      cefTRIAXone   IVPB 2 Gram(s) IV Intermittent every 24 hours  chlorhexidine 4% Liquid 1 Application(s) Topical <User Schedule>  dextrose 40% Gel 15 Gram(s) Oral once PRN  dextrose 5%. 1000 milliLiter(s) IV Continuous <Continuous>  dextrose 50% Injectable 12.5 Gram(s) IV Push once  dextrose 50% Injectable 25 Gram(s) IV Push once  dextrose 50% Injectable 25 Gram(s) IV Push once  enoxaparin Injectable 40 milliGRAM(s) SubCutaneous every 24 hours  gabapentin 100 milliGRAM(s) Oral every 12 hours  glucagon  Injectable 1 milliGRAM(s) IntraMuscular once PRN  insulin lispro (HumaLOG) corrective regimen sliding scale   SubCutaneous three times a day before meals  insulin lispro (HumaLOG) corrective regimen sliding scale   SubCutaneous at bedtime  levothyroxine 175 MICROGram(s) Oral daily  milrinone Infusion 0.125 MICROgram(s)/kG/Min IV Continuous <Continuous>  rifaximin 550 milliGRAM(s) Oral two times a day  spironolactone 25 milliGRAM(s) Oral daily  torsemide 20 milliGRAM(s) Oral two times a day      Physical Exam:    Vitals:  Vital Signs Last 24 Hours  T(C): 36.4 (19 @ 04:25), Max: 36.4 (19 @ 13:00)  HR: 100 (19 @ 06:38) (100 - 105)  BP: 91/64 (19 @ 06:38) (90/65 - 108/71)  RR: 18 (19 @ 06:38) (17 - 18)  SpO2: 100% (19 @ 06:38) (98% - 100%)    Weight in k.6 ( @ 07:55)    I&O's Summary    27 Mar 2019 07:01  -  28 Mar 2019 07:00  --------------------------------------------------------  IN: 820.7 mL / OUT: 325 mL / NET: 495.7 mL    Tele:  VPaced HR     General: No acute distress.  HEENT: EOM intact.  Neck: Neck supple. JVP 18-20 cm H2O. No masses  Chest: Clear to auscultation bilaterally  CV: RRR, Normal S1 and S2. III/VI SM. +1 radial pulses bilaterally. + 1 BLE edema  Abdomen: Soft, non-distended, non-tender  Skin: Staples on R scalp no drainage. Cool peripherally.  Neurology: Lethargic, arousable to voice. oriented times three. Sensation intact  Psych: Affect normal. Poor insight.    Labs:                        9.1    15.35 )-----------( 272      ( 27 Mar 2019 10:01 )             28.9         123<L>  |  79<L>  |  29<H>  ----------------------------<  134<H>  5.4<H>   |  17<L>  |  1.18    Ca    7.3<L>      28 Mar 2019 09:35  Mg     1.9         Lactate, Blood: 7.1 mmol/L ( @ 16:52)  Lactate, Blood: 3.5 mmol/L ( @ 13:10)  Lactate, Blood: 2.1 mmol/L ( @ 19:49)

## 2019-03-28 NOTE — BEHAVIORAL HEALTH ASSESSMENT NOTE - NSBHCHARTREVIEWLAB_PSY_A_CORE FT
9.1    15.35 )-----------( 272      ( 27 Mar 2019 10:01 )             28.9     03-28    123<L>  |  79<L>  |  29<H>  ----------------------------<  134<H>  5.4<H>   |  17<L>  |  1.18    Ca    7.3<L>      28 Mar 2019 09:35  Mg     1.9     03-28

## 2019-03-28 NOTE — PROGRESS NOTE ADULT - ATTENDING COMMENTS
She is complaining of fatigue due to poor sleep, but my concern is that it is related to weaning the milrinone. She refuses to discuss it further with me. Given her worsening lactate and poor cardiac output, we have stopped the metoprolol as that could hasten her decline in this circumstance (ie, shock-like state). She was willing to take the diuretics yesterday, but today has refused as she does not believe that her leg swelling is related to her heart condition, which she has stated to me on prior admissions.    She is now off milrinone and I believe her decline is due to poor cardiac output. Her lactate is 8.6 and she is hyponatremic with K 5.4. Her spironolactone will be stopped given the hyperkalemia. We will offer torsemide 80 mg po BID since she refused the IV lasix this morning.    If she insists on leaving the hospital with an elevated lactate despite our warnings, she will be discharged against medical advice. In my estimation, prior to weaning the milrinone, she was able to verbalize the risks of coming off the milrinone and she still insisted that it be done. She voiced this to myself, my colleague, Dr. Obrien the week prior, and to Dr. Wolfe, the nephrologist following her here.  Antibiotics per ID recs.  Palliative Care consult appreciated.  She is welcome to follow-up in ambulatory HF NP clinic on Monday if she is able.

## 2019-03-28 NOTE — BEHAVIORAL HEALTH ASSESSMENT NOTE - NSBHCONSULTFOLLOWDETAILS_PSY_A_CORE FT
pt does not have capacity to refuse milrinone, lasix . pt does not understand risks/benefits of tx, does not appreciate the severity of her condition. defer to hcp.

## 2019-03-28 NOTE — PROGRESS NOTE ADULT - SUBJECTIVE AND OBJECTIVE BOX
INTERVAL HPI/OVERNIGHT EVENTS: I feel tired as did not sleep well . Very ill looking and lethargic today.   Vital Signs Last 24 Hrs  T(C): 36.4 (28 Mar 2019 04:25), Max: 36.4 (27 Mar 2019 17:46)  T(F): 97.5 (28 Mar 2019 04:25), Max: 97.6 (27 Mar 2019 17:46)  HR: 100 (28 Mar 2019 06:38) (100 - 105)  BP: 91/64 (28 Mar 2019 06:38) (90/65 - 101/69)  BP(mean): --  RR: 18 (28 Mar 2019 06:38) (17 - 18)  SpO2: 100% (28 Mar 2019 06:38) (98% - 100%)  I&O's Summary    27 Mar 2019 07:01  -  28 Mar 2019 07:00  --------------------------------------------------------  IN: 820.7 mL / OUT: 325 mL / NET: 495.7 mL      MEDICATIONS  (STANDING):  calcitriol   Capsule 0.5 MICROGram(s) Oral daily  calcium carbonate 1250 mG  + Vitamin D (OsCal 500 + D) 1 Tablet(s) Oral two times a day  cefTRIAXone   IVPB      cefTRIAXone   IVPB 2 Gram(s) IV Intermittent every 24 hours  chlorhexidine 4% Liquid 1 Application(s) Topical <User Schedule>  dextrose 5%. 1000 milliLiter(s) (50 mL/Hr) IV Continuous <Continuous>  dextrose 50% Injectable 12.5 Gram(s) IV Push once  dextrose 50% Injectable 25 Gram(s) IV Push once  dextrose 50% Injectable 25 Gram(s) IV Push once  enoxaparin Injectable 40 milliGRAM(s) SubCutaneous every 24 hours  gabapentin 100 milliGRAM(s) Oral every 12 hours  insulin lispro (HumaLOG) corrective regimen sliding scale   SubCutaneous three times a day before meals  insulin lispro (HumaLOG) corrective regimen sliding scale   SubCutaneous at bedtime  levothyroxine 175 MICROGram(s) Oral daily  milrinone Infusion 0.125 MICROgram(s)/kG/Min (2.996 mL/Hr) IV Continuous <Continuous>  rifaximin 550 milliGRAM(s) Oral two times a day  torsemide 80 milliGRAM(s) Oral two times a day    MEDICATIONS  (PRN):  benzocaine 15 mG/menthol 3.6 mG Lozenge 1 Lozenge Oral every 4 hours PRN Sore Throat  dextrose 40% Gel 15 Gram(s) Oral once PRN Blood Glucose LESS THAN 70 milliGRAM(s)/deciliter  glucagon  Injectable 1 milliGRAM(s) IntraMuscular once PRN Glucose LESS THAN 70 milligrams/deciliter    LABS:                        9.1    15.35 )-----------( 272      ( 27 Mar 2019 10:01 )             28.9     03-28    123<L>  |  79<L>  |  29<H>  ----------------------------<  134<H>  5.4<H>   |  17<L>  |  1.18    Ca    7.3<L>      28 Mar 2019 09:35  Mg     1.9     03-28          CAPILLARY BLOOD GLUCOSE      POCT Blood Glucose.: 165 mg/dL (28 Mar 2019 12:25)  POCT Blood Glucose.: 130 mg/dL (28 Mar 2019 09:00)  POCT Blood Glucose.: 73 mg/dL (28 Mar 2019 08:37)  POCT Blood Glucose.: 59 mg/dL (28 Mar 2019 08:16)  POCT Blood Glucose.: 56 mg/dL (28 Mar 2019 07:46)  POCT Blood Glucose.: 62 mg/dL (28 Mar 2019 07:27)  POCT Blood Glucose.: 65 mg/dL (28 Mar 2019 07:25)  POCT Blood Glucose.: 123 mg/dL (27 Mar 2019 21:32)  POCT Blood Glucose.: 250 mg/dL (27 Mar 2019 17:21)          REVIEW OF SYSTEMS:  CONSTITUTIONAL:  fatigue  EYES: No eye pain, visual disturbances, or discharge  ENMT:  No difficulty hearing, tinnitus, vertigo; No sinus or throat pain  NECK: No pain or stiffness  RESPIRATORY: No cough, wheezing, chills or hemoptysis; No shortness of breath  CARDIOVASCULAR: No chest pain, palpitations, dizziness, or leg swelling  GASTROINTESTINAL: No abdominal or epigastric pain. No nausea, vomiting, or hematemesis; No diarrhea or constipation. No melena or hematochezia.  GENITOURINARY: No dysuria, frequency, hematuria, or incontinence  NEUROLOGICAL: lethargic     Consultant(s) Notes Reviewed:  [x ] YES  [ ] NO    PHYSICAL EXAM:  GENERAL: NAD,not in any distress ,  HEAD:  Atraumatic, Normocephalic  EYES: EOMI, PERRLA, conjunctiva and sclera clear  NECK: JVD+++  NERVOUS SYSTEM:  Alert & Oriented X3, No focal deficit   CHEST/LUNG: Good air entry bilateral with no  rales, rhonchi, wheezing, or rubs  HEART: Regular rate and rhythm; SSM+  ABDOMEN: Soft, Nontender, Nondistended; Bowel sounds present  EXTREMITIES:   No clubbing, cyanosis, but ++ edema    Care Discussed with Consultants/Other Providers [ x] YES  [ ] NO

## 2019-03-28 NOTE — PROGRESS NOTE ADULT - ASSESSMENT
64 yo F h/o severe non ischemic cardiomyopathy (EF 10%, LVIDd 6.1 cm) identified in 2013, moderate-severe MR, severe TR, s/p AICD on home milrinone infusion via PICC, afib on aspirin & elliquis, DM2, HTN, thyroid cancer s/p thyroidectomy presents with fall, found to have stable subdural hematoma, and as well as Klebsiella bacteremia likely related to PICC line infection.     On exam patient appears volume up with elevated JVP. Weight up 0.3kg from yesterday with scant urine output. She is refusing diuretics. Patient is not a candidate for advanced therapies given lack of insight despite repeated education regarding her condition and treatment recommendations. Her renal function is normal although SCr is uptrending. SBP 90-100s, not on neurohormonal antagonists. Lactate continues to uptrend and was last 7.1 yesterday evening on 0.25mcg/kg/min of milrinone, which has subsequently been weaned off per her request despite ongoing explanation that she was not adequately supported on high dose of inotropes, and is declining with high risk for further deterioration and death. This conversation was had with her  at the [facility], who also verbalized understanding of risks of stopping inotropes. Due to concern for recurrent infection with PICC line she does not want to go home on milrinone. Should she continue to insist upon leaving, it would be against medical advice.

## 2019-03-28 NOTE — DISCHARGE NOTE NURSING/CASE MANAGEMENT/SOCIAL WORK - NSDCDPATPORTLINK_GEN_ALL_CORE
You can access the [facility], offered by [facility], by registering with the following website: [email or web address]

## 2019-03-28 NOTE — DISCHARGE NOTE PROVIDER - INSTRUCTIONS
cardiac healthy diet  fluid restriction @ 1200 cc/day = five 8 ounce glasses fluid per day  Glucerna shake two times daily

## 2019-03-28 NOTE — PROVIDER CONTACT NOTE (OTHER) - ACTION/TREATMENT ORDERED:
NP recommended rescheduling lasix dose to later this AM, and performing bladder scan to check for retention. Bladder scan done, 90 ml found in bladder.
supplement potassium   & Mag to morning BMP
Apple juice given breakfast tray served will repeat fingerstick in 15min with goal over 100
PA to order and administer cathflow

## 2019-03-28 NOTE — PROGRESS NOTE ADULT - SUBJECTIVE AND OBJECTIVE BOX
Patient appears fatigues, no CP, NO SOB     benzocaine 15 mG/menthol 3.6 mG Lozenge 1 Lozenge Oral every 4 hours PRN  calcitriol   Capsule 0.5 MICROGram(s) Oral daily  calcium carbonate 1250 mG  + Vitamin D (OsCal 500 + D) 1 Tablet(s) Oral two times a day  cefTRIAXone   IVPB      cefTRIAXone   IVPB 2 Gram(s) IV Intermittent every 24 hours  chlorhexidine 4% Liquid 1 Application(s) Topical <User Schedule>  dextrose 40% Gel 15 Gram(s) Oral once PRN  dextrose 5%. 1000 milliLiter(s) IV Continuous <Continuous>  dextrose 50% Injectable 12.5 Gram(s) IV Push once  dextrose 50% Injectable 25 Gram(s) IV Push once  dextrose 50% Injectable 25 Gram(s) IV Push once  enoxaparin Injectable 40 milliGRAM(s) SubCutaneous every 24 hours  gabapentin 100 milliGRAM(s) Oral every 12 hours  glucagon  Injectable 1 milliGRAM(s) IntraMuscular once PRN  insulin lispro (HumaLOG) corrective regimen sliding scale   SubCutaneous three times a day before meals  insulin lispro (HumaLOG) corrective regimen sliding scale   SubCutaneous at bedtime  levothyroxine 175 MICROGram(s) Oral daily  milrinone Infusion 0.125 MICROgram(s)/kG/Min IV Continuous <Continuous>  rifaximin 550 milliGRAM(s) Oral two times a day  torsemide 80 milliGRAM(s) Oral two times a day                            9.1    15.35 )-----------( 272      ( 27 Mar 2019 10:01 )             28.9       Hemoglobin: 9.1 g/dL (03-27 @ 10:01)  Hemoglobin: 8.8 g/dL (03-26 @ 09:13)  Hemoglobin: 9.0 g/dL (03-25 @ 22:29)  Hemoglobin: 8.8 g/dL (03-24 @ 08:58)  Hemoglobin: 9.4 g/dL (03-23 @ 19:28)      03-28    123<L>  |  79<L>  |  29<H>  ----------------------------<  134<H>  5.4<H>   |  17<L>  |  1.18    Ca    7.3<L>      28 Mar 2019 09:35  Mg     1.9     03-28      Creatinine Trend: 1.18<--, 1.08<--, 0.83<--, 0.77<--, 0.71<--, 0.76<--    COAGS:           T(C): 36.3 (03-28-19 @ 14:00), Max: 36.4 (03-27-19 @ 17:46)  HR: 93 (03-28-19 @ 14:00) (93 - 105)  BP: 108/76 (03-28-19 @ 14:00) (90/65 - 108/76)  RR: 18 (03-28-19 @ 14:00) (17 - 18)  SpO2: 99% (03-28-19 @ 14:00) (98% - 100%)  Wt(kg): --    I&O's Summary    27 Mar 2019 07:01  -  28 Mar 2019 07:00  --------------------------------------------------------  IN: 820.7 mL / OUT: 325 mL / NET: 495.7 mL    28 Mar 2019 07:01  -  28 Mar 2019 17:14  --------------------------------------------------------  IN: 720 mL / OUT: 0 mL / NET: 720 mL      	  Lymphatic: No lymphadenopathy trace edema in LE bilaterally   Cardiovascular: Normal S1 S2, + JVD, No murmurs , Peripheral pulses palpable 2+ bilaterally  Respiratory: Lungs clear to auscultation  Gastrointestinal:  Soft, Non-tender, + BS	  Skin: No rashes, No ecchymoses, No cyanosis, warm to touch  Psychiatry:  Mood & affect appropriate      TELEMETRY: Afib       ECG: < from: 12 Lead ECG (03.20.19 @ 05:58) >  WIDE QRS TACHYCARDIA  LEFT AXIS DEVIATION  RIGHT BUNDLE BRANCH BLOCK  LEFT VENTRICULAR HYPERTROPHY WITH REPOLARIZATION ABNORMALITY  INFERIOR INFARCT , AGE UNDETERMINED  ABNORMAL ECG    < end of copied text >    	  RADIOLOGY:  OTHER:     DIAGNOSTIC TESTING:  [ ] Echocardiogram: < from: TTE with Doppler (w/Cont) (11.07.18 @ 05:53) >  Conclusions:  1. Tethered mitral valve leaflets with normal opening.  Mitral annular calcification and thickened  mitral leaflet  tips Moderate mitral regurgitation.  2. Calcified trileaflet aortic valve with normal opening.  3. Moderately dilated left atrium.  LA volume index = 43  cc/m2.  4. Eccentric left ventricular hypertrophy (dilated left  ventricle with normal relative wall thickness).  5. Severe global left ventricularsystolic dysfunction.  Endocardial visualization enhanced with intravenous  injection of Ultrasonic Enhancing Agent (Definity). No LV  thrombus.  Septal flattening consistent with right  ventricular overload.  6. Severe diastolic dysfunction with elevated filling  pressures  7. Severe right atrial enlargement.  8. Right ventricular enlargement with decreased right  ventricular systolic function.  A device wire is noted in  the right heart.  9. Dilated tricuspid annulus with malcoaptation of  tricuspid leaflets. Severe tricuspid regurgitation.  *** No previous Echo exam.    < end of copied text >    [ ]  Catheterization:  [ ] Stress Test:    	         ASSESSMENT/PLAN:  64 yo F with history of severe NICM s/p ICD on home milrinone, AF on ac with eliquis, HTN, DM, thyroid CA s/p thyroidectomy who is being seen for heart failure and cardiomyopathy.    ABX per ID, follow up on cultures   Cont to hold A/C and antiplts meds  at present , would need to restart once cleared by Neuro sx   If pt cant be on A/C , then the pt need a outpatient LOLITA occlusion ( watchman)   Off milrinone at this time, appears to be in a low flow state.  Hold all negative ionotropes  Patient does not want to restart milrinone at present    Augusto Grimes MD, [facility]  BEEPER [phone]

## 2019-03-28 NOTE — BEHAVIORAL HEALTH ASSESSMENT NOTE - NS ED BHA REVIEW OF ED CHART AVAILABLE INVESTIGATIONS REVIEWED
Saline nasal spray 2-4 sprays each nostril PRN    GERD (Gastro Esophageal Reflux Disorder)  Other names    reflux     Laryngopharyngeal Reflux Disorder (LPRD)   Symptoms    dry, choking sensation, especially during the night     voice quality that is worst in the morning     raw, burning sensation in the throat     pain in throat, neck, or running from back of chin along neck     frequent coughing or desire to clear throat     rough, gritty voice quality     decline in voice quality or comfort with continued voice use     a sour taste in your mouth upon waking up       Interestingly, the majority of the patients in the [facility] who have laryngeal symptoms of GERD do not have any stomach discomfort or sensation of heartburn.   Cause  Acid from the stomach refluxes back up through the esophagus and spills over onto the larynx. This irritates the vocal folds (also called vocal cords; see the explanation of this terminology) and creates inflammation, which causes the vocal folds to vibrate unevenly. Coughing and throat clearing from the irritation can make the inflammation worse. The resulting voice disorder is often related to the poor vibratory quality of the inflamed vocal folds and the muscle tension created by effortful attempts at compensation.  Treatment  Anti-reflux medication and dietary precautions are the first line of defense. Functional voice therapy is useful to teach techniques for reducing effortful compensation and instruct the individual in improved vocal hygiene.  At the [facility], we do not hand out a list of foods and beverages that must be avoided. Rather, we educate about types of foods and beverages known to cause reflux and encourage the individual to systematically investigate which foods stimulate their own reflux. Also, the individual is encouraged to manage reflux under the care of a Gastroenterologist (gastrointestinal specialist).  Interested readers are encouraged to  look at the website of the [facility] at [facility] for a more in depth discussion of GERD and the voice (see our Links page).  Lifestyle changes that may help reduce symptoms of GERD/LPRD    eat smaller meals more frequently throughout the day, rather than three large meals     elevate the head of your bed 2-3 inches (don't just use extra pillows for your head)     avoid clothes that fit tightly around the waist     avoid lying down within 2-3 hours of eating (don't eat dinner late at night)   Types of foods known to trigger increased stomach acid    spicy food (such as chili or jalapeño peppers, Kenyan or Szechuan spices)     acidic foods such as tomato products or citrus products     greasy foods     caffeine     alcohol     carbonation     roughage, such as popcorn and peanuts, or raw vegetables     dairy products     strong mint such as peppermint candies     chocolate     Reading this list might make you think you can only eat bread and oatmeal for the rest of your life. Fortunately, most individuals are not triggered by everything on this list. For example, for every person who is lactose intolerant and has problems with dairy products, there may be someone else whose digestive system is calmed by milk. Also, in our experience, few persons are triggered by peppermints or chocolate. Therefore, we recommend that you experiment with your own dietary habits, changing one food class at a time. Also, if you have recently started taking anti-reflux medications, you may want to wait for several months, to see how the medication works without any change in your dietary habits.         Yes

## 2019-03-28 NOTE — PROGRESS NOTE ADULT - ASSESSMENT
64 yo F h/o severe cardiomyopathy c/b CHF w EF 10% s/p AICD on home milrinone infusion via PICC, afib on aspirin & elliquis, DM2, HTN, thyroid cancer s/p thyroidectomy presents for mechanical fall, found to have SDH, admitted to MICU for multifactorial shock likely cardiogenic and septic shock i/s/o possible PNA.    Sepsis  with Bacteremia K Pneumonia  : - Present on admission. ID helping.   - IV Rocephin . WBC went up so ID concerned and will need recultured .     Mechanical Fall with SDH   - SDH stable on repeat CT head.   - appreciate neurosurgery recs    Severe cardiomyopathy c/b CHF s/p AICD on home milrinone infusion via PICC with Shock ; Multifactorial shock w cardiogenic & septic shock i/s/o PNA. Management per  Cardiology, HF Team and EP.   - off Milrinone gtt now but risks including worsening CHF and possible death explained to patient . Patient understands but doesn't want any more PICC line Or IV Milrinone. On Torsemide .   - TTE    Lactic Acidosis    : -Chronic.  - Daily CBC    Hypocalcemia ,Hypokalemia & hypomagnesemia, Hyponatremia . Cr stable. Renal helping.   - monitor BMP, replete for K > 4, Mg > 2    Congestive hepatopathy : Sec to CHF .Abd mildly distended, non-tender. Cholelithiasis on prior US. Lipase 140. Ammonia wnl.   - Stable.     DM2; Sugars in good range.   - ISS q6h    Thyroid cancer s/p thyroidectomy  - on synthroid     Mechanical Fall with SDH   - SDH stable on repeat CT head.   - appreciate neurosurgery recs    Anemia   : -Chronic.  - Daily CBC    - DVT ppx: SCDs, hold pharmacological ppx  - tx SDH as above    Disposition : Palliative consult noted. Psychiatry consulted and patient has no capacity to refuse Milrinone drip. Patient wants to leave against medical advised. Said I understand my condition will get worse and I may die but I need to go home. Tried to call her daughter as doesn't want me to speak to her  but no response. Patient said my daughter will be here in an hour.     her over all prognosis is very very poor. 62 yo F h/o severe cardiomyopathy c/b CHF w EF 10% s/p AICD on home milrinone infusion via PICC, afib on aspirin & elliquis, DM2, HTN, thyroid cancer s/p thyroidectomy presents for mechanical fall, found to have SDH, admitted to MICU for multifactorial shock likely cardiogenic and septic shock i/s/o possible PNA.    Sepsis  with Bacteremia K Pneumonia  : - Present on admission. ID helping.   - IV Rocephin . WBC went up so ID concerned and will need recultured .     Mechanical Fall with SDH   - SDH stable on repeat CT head.   - appreciate neurosurgery recs    Severe cardiomyopathy c/b CHF s/p AICD on home milrinone infusion via PICC with Shock ; Multifactorial shock w cardiogenic & septic shock i/s/o PNA. Management per  Cardiology, HF Team and EP.   - off Milrinone gtt now but risks including worsening CHF and possible death explained to patient . Patient understands but doesn't want any more PICC line Or IV Milrinone. On Torsemide .   - TTE    Lactic Acidosis with metabolic Acidosis   : - Likely secondary to hypoperfusion/Infection    - Daily Lactic acid     Hypocalcemia ,Hypokalemia & hypomagnesemia, Hyponatremia . Cr stable. Renal helping.   - monitor BMP, replete for K > 4, Mg > 2    Congestive hepatopathy : Sec to CHF .Abd mildly distended, non-tender. Cholelithiasis on prior US. Lipase 140. Ammonia wnl.   - Stable.     DM2; Sugars in good range.   - ISS q6h    Thyroid cancer s/p thyroidectomy  - on synthroid     Mechanical Fall with SDH   - SDH stable on repeat CT head.   - appreciate neurosurgery recs    Anemia   : -Chronic.  - Daily CBC    - DVT ppx: SCDs, hold pharmacological ppx  - tx SDH as above    Disposition : Palliative consult noted. Psychiatry consulted and patient has no capacity to refuse Milrinone drip. Patient wants to leave against medical advised. Said I understand my condition will get worse and I may die but I need to go home. Tried to call her daughter as doesn't want me to speak to her  but no response. Patient said my daughter will be here in an hour.     Her over all prognosis is very very poor.

## 2019-03-28 NOTE — PROGRESS NOTE ADULT - SUBJECTIVE AND OBJECTIVE BOX
NEPHROLOGY-[facility] [phone]        Patient seen and examined in bed.  Her Milrinone dose is being reduced and her lactate is increasing         MEDICATIONS  (STANDING):  calcitriol   Capsule 0.5 MICROGram(s) Oral daily  calcium carbonate 1250 mG  + Vitamin D (OsCal 500 + D) 1 Tablet(s) Oral two times a day  cefTRIAXone   IVPB      cefTRIAXone   IVPB 2 Gram(s) IV Intermittent every 24 hours  chlorhexidine 4% Liquid 1 Application(s) Topical <User Schedule>  dextrose 5%. 1000 milliLiter(s) (50 mL/Hr) IV Continuous <Continuous>  dextrose 50% Injectable 12.5 Gram(s) IV Push once  dextrose 50% Injectable 25 Gram(s) IV Push once  dextrose 50% Injectable 25 Gram(s) IV Push once  enoxaparin Injectable 40 milliGRAM(s) SubCutaneous every 24 hours  furosemide   Injectable 80 milliGRAM(s) IV Push once  gabapentin 100 milliGRAM(s) Oral every 12 hours  insulin lispro (HumaLOG) corrective regimen sliding scale   SubCutaneous three times a day before meals  insulin lispro (HumaLOG) corrective regimen sliding scale   SubCutaneous at bedtime  levothyroxine 175 MICROGram(s) Oral daily  milrinone Infusion 0.125 MICROgram(s)/kG/Min (2.996 mL/Hr) IV Continuous <Continuous>  rifaximin 550 milliGRAM(s) Oral two times a day  spironolactone 25 milliGRAM(s) Oral daily  torsemide 20 milliGRAM(s) Oral two times a day      VITAL:  T(C): , Max: 36.4 (03-27-19 @ 13:00)  T(F): , Max: 97.6 (03-27-19 @ 13:00)  HR: 100 (03-28-19 @ 06:38)  BP: 91/64 (03-28-19 @ 06:38)  BP(mean): --  RR: 18 (03-28-19 @ 06:38)  SpO2: 100% (03-28-19 @ 06:38)  Wt(kg): --    I and O's:    03-27 @ 07:01  -  03-28 @ 07:00  --------------------------------------------------------  IN: 820.7 mL / OUT: 325 mL / NET: 495.7 mL          PHYSICAL EXAM:    Constitutional: NAD  Neck:  No JVD  Respiratory: CTAB/L  Cardiovascular: S1 and S2  Gastrointestinal: BS+, soft, NT/ND  Extremities: +  peripheral edema  Neurological: A/O x 3, no focal deficits  Psychiatric: Normal mood, normal affect  : No Gustafson  Skin: No rashes  Access: Not applicable    LABS:                        9.1    15.35 )-----------( 272      ( 27 Mar 2019 10:01 )             28.9     03-27    124<L>  |  78<L>  |  22  ----------------------------<  64<L>  4.9   |  11<L>  |  1.08    Ca    7.0<L>      27 Mar 2019 07:06            Urine Studies:          RADIOLOGY & ADDITIONAL STUDIES:

## 2019-03-28 NOTE — PROGRESS NOTE ADULT - ASSESSMENT
The patient is a deidre 63-year-old female with the past medical history of hypertension, diabetes, nonishemic cardiomyopathy, presumed parathyroidectomy, history of thyroid cancer, presents with mechanical fall.  The patient at present has hypervolemic hyponatremia.  She also has hypocalcemia, but again an old finding.    Gram neg rich sepsis  Hyponatremia/Hypocalcemia/Hypomagnesemia  She does not want Milrinone infusion and reduction has lead to decreased urine output and worsening lactate         1.	Renal.  Calcitriol/Os-Donta. Mag oxide 400mg po tid to end today;  Pt was encouraged to increase intake of protein and restrict fluids without calories.  However the reduction in EF will propagate the hyponatremia. I dont believe samsca is the correct Rx at present   2.	Cardiology.   Continue with milrinone at the reduced dose and the pt wants this medication off.  She is at high risk for sudden death!!!.  Demedex started as well as Aldactone   3.	Infectious Disease.    Continue with intravenous antibiotics.    4.	Endo-Synthroid 175 today     Palliative care eval noted   Prognosis guarded

## 2019-03-28 NOTE — CHART NOTE - NSCHARTNOTEFT_GEN_A_CORE
PROCEDURE NOTE:  Central Line removal  Site: Right IJ Triple Lumen Catheter Placement    Requested by ______Dr. Rojas________ to remove the Central line. Daughter at bedside. Explained the procedure. Placed in Trendelenburg. Catheter removed and tip intact. Pressure applied for greater than 15 mins, no hematoma or bleeding noted. Patient tolerated procedure well. A DSD applied, instructed to keep patient supine for 20-30 mins. RN aware.

## 2019-03-28 NOTE — PROGRESS NOTE ADULT - NSHPATTENDINGPLANDISCUSS_GEN_ALL_CORE
pt and renal attending and NP pt and renal attending and NP. D/W NP in detail the discussion with her daughter

## 2019-03-28 NOTE — DISCHARGE NOTE PROVIDER - CARE PROVIDER_API CALL
Javier Gallego (MD; PhD)  Adv Heart Fail Trnsplnt Cardio; Cardiovascular Disease; Internal Medicine  [address] [place] [address]  Phone: [phone]  Fax: [phone]  Follow Up Time:

## 2019-03-28 NOTE — PROGRESS NOTE ADULT - REASON FOR ADMISSION
mechanical fall

## 2019-03-28 NOTE — BEHAVIORAL HEALTH ASSESSMENT NOTE - SUMMARY
62 yo female, domiciled with family, no pphx, retired , h/o severe cardiomyopathy c/b CHF w EF 10% s/p AICD on home milrinone infusion via PICC, afib on aspirin & elliquis, DM2, HTN, thyroid cancer s/p thyroidectomy presents for mechanical fall, complicated hospital course, Klebsiella bacteremia, PICC removal, now pt refusing milrinone, lasix, wants to leave the hospital, consulted for capacity to refuse meds-milrinone, lasix. Pt was initially refusing to speak to psychiatry, stated it was not necessary. Pt was not able to state risks/benefits of treatment, insisting on leaving the hospital, getting a second opinion. Pt does not have capacity to refuse milrinone and lasix at this time.

## 2019-03-28 NOTE — DISCHARGE NOTE NURSING/CASE MANAGEMENT/SOCIAL WORK - NSDCFUADDAPPT_GEN_ALL_CORE_FT
CHF clinic appointment in [address] on Wednesday 4/3 @ 2:30 PM  follow up with primary care physician within one week after discharge  Patient signed out against medical advice

## 2019-03-28 NOTE — DISCHARGE NOTE PROVIDER - HOSPITAL COURSE
64 yo F h/o severe cardiomyopathy c/b CHF w EF 10% s/p AICD on home milrinone infusion via PICC, afib on aspirin & elliquis, DM2, HTN, thyroid cancer s/p thyroidectomy presents for mechanical fall, found to have SDH, admitted to MICU for multifactorial shock likely cardiogenic and septic shock i/s/o possible PNA.        Sepsis  with Bacteremia K Pneumonia  : - Present on admission. ID helping.     - IV Rocephin . change to oral Levaquin for discharge x 7 more days        Mechanical Fall with SDH     - SDH stable on repeat CT head.     - appreciate neurosurgery recs        Severe cardiomyopathy c/b CHF s/p AICD on home milrinone infusion via PICC with Shock ; Multifactorial shock w cardiogenic & septic shock i/s/o PNA. Management per  Cardiology, HF Team and EP.     - off Milrinone gtt now but risks including worsening CHF and possible death explained to patient . Patient understands but doesn't want any more PICC line Or IV Milrinone. On Torsemide .     - TTE        Lactic Acidosis with metabolic Acidosis   : - Likely secondary to hypoperfusion/Infection          Hypocalcemia ,Hypokalemia & hypomagnesemia, Hyponatremia . Cr stable. Renal helping.     - monitor BMP, replete for K > 4, Mg > 2        Congestive hepatopathy : Sec to CHF .Abd mildly distended, non-tender. Cholelithiasis on prior US. Lipase 140. Ammonia wnl.     - Stable.         DM2; Sugars in good range.         Thyroid cancer s/p thyroidectomy    - on synthroid         Mechanical Fall with SDH     - SDH stable on repeat CT head.     - appreciate neurosurgery recs        Anemia   : -Chronic.    - Daily CBC        - DVT ppx: SCDs, hold pharmacological ppx    - tx SDH as above        Disposition : Palliative consult noted. Psychiatry consulted and patient has no capacity to refuse Milrinone drip. Patient wants to leave against medical advised. Said I understand my condition will get worse and I may die but I need to go home. Tried to call her daughter as doesn't want me to speak to her  but no response. Patient said my daughter will be here in an hour.       Her over all prognosis is very very poor.                 Attending Attestation:     45 minutes spent on total encounter; more than 50% of the visit was spent counseling and/or coordinating care by the attending physician.         Plan discussed with pt and renal attending and NP. D/W NP in detail the discussion with her daughter.        Electronic Signatures for Addendum Section:     Tiara Rojas) (Signed Addendum 28-Mar-2019 17:15)        Daughter Gaurang Lindsay HCP was by bedside and spoke to her in great detail . I showed her lab results also . Said I am  as well Sickle Cell patient so understand everything . My mother is stub born and will not allow you do  anything and will refuse all meds if she stays here . While we were talking pCA came that she refused finger stick to check glucose. Per daughter "I understand her condition is very critical and may even die ." I will take full responsibility of trying to convince her to get 2nd opinion at [place] or other hospital ASAP. Spoke with ID and will change to PO Levaquin . She will be leaving AMA and HCP daughter understands the risks including sudden death .

## 2019-03-28 NOTE — PROGRESS NOTE ADULT - PROBLEM SELECTOR PLAN 2
- On ceftriaxone. Day 8 of IV abx with plan for 10-14 days. May potentially switch to PO quinolones after day 7 per ID.  - Appreciate ID input. - On ceftriaxone. Day 8 of IV abx with plan for 10-14 days. May potentially switch to PO quinolones. Leukocytosis noted yesterday. Repeat Cx per ID.  - Appreciate ID input.

## 2019-03-28 NOTE — PROGRESS NOTE ADULT - ATTENDING COMMENTS
EP ATTENDING    Agree with above. F/u neurosurgery regarding if/when a/c can be restarted for paroxysmal atrial fibrillation. Otherwise no further inpatient EP workup needed.

## 2019-03-28 NOTE — BEHAVIORAL HEALTH ASSESSMENT NOTE - NSBHCHARTREVIEWINVESTIGATE_PSY_A_CORE FT
Ventricular Rate 115 BPM    Atrial Rate 115 BPM    P-R Interval 156 ms    QRS Duration 84 ms    Q-T Interval 354 ms    QTC Calculation(Bezet) 489 ms    P Axis 28 degrees    R Axis -14 degrees    T Axis -10 degrees    Diagnosis Line SINUS TACHYCARDIA  OTHERWISE NORMAL ECG    Confirmed by MD Stephanie, [facility] ([id number]) on 3/23/2019 1:20:26 PM

## 2019-03-28 NOTE — PROGRESS NOTE ADULT - SUBJECTIVE AND OBJECTIVE BOX
Patient is a 63y old  Female who presents with a chief complaint of mechanical fall (28 Mar 2019 10:30)    Being followed by ID for bacteremia        Interval history:  pt refusing IV therapy with milrinone  Pt doing poorly overnight  WBC is up  lactate is up  electrolytes are abnormal  had one loose bowel mvmt that patient blames on the glucerna  pt unwilling to stay for further management    No other acute events          PAST MEDICAL & SURGICAL HISTORY:  Asthma  CHF (congestive heart failure): with EF of 10% s/p AICD  DM (diabetes mellitus)  HTN (hypertension)  Thyroid ca  AICD (automatic cardioverter/defibrillator) present  S/P thyroidectomy    Allergies    Isordil (Headache)  penicillin (Rash)    Intolerances      Antimicrobials:    cefTRIAXone   IVPB      cefTRIAXone   IVPB 2 Gram(s) IV Intermittent every 24 hours  rifaximin 550 milliGRAM(s) Oral two times a day    MEDICATIONS  (STANDING):  calcitriol   Capsule 0.5 MICROGram(s) Oral daily  calcium carbonate 1250 mG  + Vitamin D (OsCal 500 + D) 1 Tablet(s) Oral two times a day  cefTRIAXone   IVPB      cefTRIAXone   IVPB 2 Gram(s) IV Intermittent every 24 hours  chlorhexidine 4% Liquid 1 Application(s) Topical <User Schedule>  dextrose 5%. 1000 milliLiter(s) (50 mL/Hr) IV Continuous <Continuous>  dextrose 50% Injectable 12.5 Gram(s) IV Push once  dextrose 50% Injectable 25 Gram(s) IV Push once  dextrose 50% Injectable 25 Gram(s) IV Push once  enoxaparin Injectable 40 milliGRAM(s) SubCutaneous every 24 hours  gabapentin 100 milliGRAM(s) Oral every 12 hours  insulin lispro (HumaLOG) corrective regimen sliding scale   SubCutaneous three times a day before meals  insulin lispro (HumaLOG) corrective regimen sliding scale   SubCutaneous at bedtime  levothyroxine 175 MICROGram(s) Oral daily  milrinone Infusion 0.125 MICROgram(s)/kG/Min (2.996 mL/Hr) IV Continuous <Continuous>  rifaximin 550 milliGRAM(s) Oral two times a day  torsemide 80 milliGRAM(s) Oral two times a day      Vital Signs Last 24 Hrs  T(C): 36.4 (03-28-19 @ 04:25), Max: 36.4 (03-27-19 @ 17:46)  T(F): 97.5 (03-28-19 @ 04:25), Max: 97.6 (03-27-19 @ 17:46)  HR: 100 (03-28-19 @ 06:38) (100 - 105)  BP: 91/64 (03-28-19 @ 06:38) (90/65 - 101/69)  BP(mean): --  RR: 18 (03-28-19 @ 06:38) (17 - 18)  SpO2: 100% (03-28-19 @ 06:38) (98% - 100%)    Physical Exam:    Constitutional pt sitting on side of the bed    HEENT PERRLA EOMI,No pallor or icterus    No oral exudate or erythema    Neck TLC     Chest Good AE,CTA    CVS  S1 S2     Abd soft BS normal No tenderness    Ext No cyanosis clubbing or edema    Joints no swelling or LOM      Lab Data:                          9.1    15.35 )-----------( 272      ( 27 Mar 2019 10:01 )             28.9       03-28    123<L>  |  79<L>  |  29<H>  ----------------------------<  134<H>  5.4<H>   |  17<L>  |  1.18    Ca    7.3<L>      28 Mar 2019 09:35  Mg     1.9     03-28      Lactate, Blood (03.28.19 @ 09:35)    Lactate, Blood: 8.6 mmol/L          .Blood Blood  03-22-19   No growth at 5 days.  --  --        < from: Xray Chest 1 View- PORTABLE-Urgent (03.20.19 @ 15:25) >      INTERPRETATION:  CLINICAL INFORMATION: Right IJ triple-lumen catheter.    EXAM: Frontal radiograph of the chest.    COMPARISON: Chest radiograph from 3/19/2019.    FINDINGS:  Interval placement of right sided central venous catheter with tip in the   SVC. A right-sided PICC line tip is in the SVC. Left-sided AICD.     The lungs are clear. No pleural effusion or pneumothorax. The heart is   enlarged.    IMPRESSION: Right IJ catheter is in the SVC. No pneumothorax.          < end of copied text >      Culture - Blood (03.22.19 @ 16:50)    Specimen Source: .Blood Blood-Peripheral    Culture Results:   No growth at 5 days.    Culture - Blood (03.22.19 @ 16:50)    Specimen Source: .Blood Blood    Culture Results:   No growth at 5 days.            WBC Count: 15.35 (03-27-19 @ 10:01)  WBC Count: 8.18 (03-26-19 @ 09:13)  WBC Count: 7.77 (03-25-19 @ 22:29)  WBC Count: 7.25 (03-24-19 @ 08:58)  WBC Count: 8.47 (03-23-19 @ 19:28)  WBC Count: 10.12 (03-23-19 @ 09:49)  WBC Count: 19.5 (03-22-19 @ 00:39)  WBC Count: 28.5 (03-21-19 @ 17:33)

## 2019-03-28 NOTE — DISCHARGE NOTE NURSING/CASE MANAGEMENT/SOCIAL WORK - FLU SEASON?
Bedside echo in progress. Dr Medina Learn by for bedside rounds.  Electronically signed by Juan Monte RN on 11/20/2018 at 2:08 PM Yes...

## 2019-03-28 NOTE — DISCHARGE NOTE PROVIDER - CARE PROVIDERS DIRECT ADDRESSES
,[email or web address]

## 2019-03-28 NOTE — DISCHARGE NOTE PROVIDER - NSDCFUADDAPPT_GEN_ALL_CORE_FT
CHF clinic appointment in [address] on Wednesday 4/3 @ 2:30 PM  follow up with primary care physician within one week after discharge CHF clinic appointment in [address] on Wednesday 4/3 @ 2:30 PM  follow up with primary care physician within one week after discharge  Patient signed out against medical advice

## 2019-03-28 NOTE — PROGRESS NOTE ADULT - ASSESSMENT
63 year old female PMH severe cardiomyopathy c/b CHF w EF 10% s/p AICD on home milrinone infusion via PICC, afib on aspirin & Eliquis, DM2, HTN, thyroid cancer s/p thyroidectomy who presented for mechanical fall on 3/19/19. In the ED febrile to 101.8, tachycardic to > 130, hypotensive to SBP in the 80's. WBC on presentation of 20.8 with 83% PMN. Transaminitis with Alk Phos of 451, , ALT of 43 and T Bili of 4.7. Lactic acid of 2.5. U/A with negative leukocyte esterase and negative nitrites. RVP negative. CT Chest/Abdomen/Pelvis with no obvious intraabdominal pathology and chest with ground glass opacities in the right middle and right lower lobes (present on 18). Blood cultures resulted with Klebsiella pneumoniae.     Overall, Klebsiella bacteremia likely due to PICC infection -    --Followup Blood Cultures NGTD  - PICC line removed  --Followup susceptibilities of Klebsiella pneumoniae- pan sensitive , abs changed to ceftriaxone    Patient is now off the milrinone . She has a high lactate , abnormal electrolytes   I do not think it is safe for patient to go home    I am also concerned by the elevated WBC. THe patient  needs to be recultured,  TLC should be removed. Pt should have follow up blood cultures. Pt denies urinary sxs but should check clean catch midstream u/a and stool should be sent for c diff  would also repeat LFTS  above discussed with patient who just insists on discharge    Psychiatry is asked to assess patients competency     Pt blames her sepsis on the line- "I could have  if I hadn't fallen and come in for evaluation " But patient does not explain why she didn't come in for medical attention despite a week of fevers and chills and did not notify anyone    Await psych input

## 2019-03-28 NOTE — DISCHARGE NOTE PROVIDER - NSDCCPCAREPLAN_GEN_ALL_CORE_FT
PRINCIPAL DISCHARGE DIAGNOSIS  Diagnosis: Subdural hematoma  Assessment and Plan of Treatment: stable status      SECONDARY DISCHARGE DIAGNOSES  Diagnosis: Pneumonia  Assessment and Plan of Treatment: Pneumonia is a lung infection that can cause a fever, cough, and trouble breathing.  Continue all antibiotics as ordered until complete.  Nutrition is important, eat small frequent meals.  Get lots of rest and drink fluids.  Call your health care provider upon arrival home from hospital and make a follow up appointment for one week.  If your cough worsens, you develop fever greater than 101', you have shaking chills, a fast heartbeat, trouble breathing and/or feel your are breathing much faster than usual, call your healthcare provider.  Make sure you wash your hands frequently.      Diagnosis: Bacteremia due to Klebsiella pneumoniae  Assessment and Plan of Treatment: Bacteremia due to Klebsiella pneumoniae  finish antibiotics as prescribed    Diagnosis: Acute on chronic systolic heart failure  Assessment and Plan of Treatment: Acute on chronic systolic heart failure  Weigh yourself daily.  If you gain 3lbs in 3 days, or 5lbs in a week call your Health Care Provider.  Do not eat or drink foods containing more than 2000mg of salt (sodium) in your diet every day.  Call your Health Care Provider if you have any swelling or increased swelling in your feet, ankles, and/or stomach.  Take all of your medication as directed.  If you become dizzy call your Health Care Provider.      Diagnosis: Paroxysmal A-fib  Assessment and Plan of Treatment: Paroxysmal A-fib  Atrial fibrillation is the most common heart rhythm problem & has the risk of stroke & heart attack  It helps if you control your blood pressure, not drink more than 1-2 alcohol drinks per day, cut down on caffeine, getting treatment for over active thyroid gland, & getting exercise  Call your doctor if you feel your heart racing or beating unusually, chest tightness or pain, lightheaded, faint, shortness of breath especially with exercise  It is important to take your heart medication as prescribed  You are NOT on anticoagulation due to subdural hematoma      Diagnosis: Encounter for palliative care  Assessment and Plan of Treatment: Encounter for palliative care  consider hospice care PRINCIPAL DISCHARGE DIAGNOSIS  Diagnosis: Subdural hematoma  Assessment and Plan of Treatment: stable status      SECONDARY DISCHARGE DIAGNOSES  Diagnosis: Bacteremia due to Klebsiella pneumoniae  Assessment and Plan of Treatment: Bacteremia due to Klebsiella pneumoniae  finish antibiotics as prescribed    Diagnosis: Acute on chronic systolic heart failure  Assessment and Plan of Treatment: Acute on chronic systolic heart failure  Weigh yourself daily.  If you gain 3lbs in 3 days, or 5lbs in a week call your Health Care Provider.  Do not eat or drink foods containing more than 2000mg of salt (sodium) in your diet every day.  Call your Health Care Provider if you have any swelling or increased swelling in your feet, ankles, and/or stomach.  Take all of your medication as directed.  If you become dizzy call your Health Care Provider.      Diagnosis: Paroxysmal A-fib  Assessment and Plan of Treatment: Paroxysmal A-fib  Atrial fibrillation is the most common heart rhythm problem & has the risk of stroke & heart attack  It helps if you control your blood pressure, not drink more than 1-2 alcohol drinks per day, cut down on caffeine, getting treatment for over active thyroid gland, & getting exercise  Call your doctor if you feel your heart racing or beating unusually, chest tightness or pain, lightheaded, faint, shortness of breath especially with exercise  It is important to take your heart medication as prescribed  You are NOT on anticoagulation due to subdural hematoma      Diagnosis: Encounter for palliative care  Assessment and Plan of Treatment: Encounter for palliative care  consider hospice care

## 2019-03-28 NOTE — PROVIDER CONTACT NOTE (OTHER) - ASSESSMENT
Pt refusing IV lasix due to a complaint of low urine output since last dose of lasix. Patient had 2 small wet spots on incontinent pad overnight, and less than 50 cc of urine in external catheter container. Pt states she does not want to take lasix due to her pour urine output.

## 2019-03-29 PROBLEM — J18.9 PNEUMONIA: Status: ACTIVE | Noted: 2019-03-29

## 2019-03-29 RX ORDER — BENZONATATE 100 MG/1
100 CAPSULE ORAL 3 TIMES DAILY
Qty: 180 | Refills: 1 | Status: ACTIVE | COMMUNITY
Start: 2019-03-29 | End: 1900-01-01

## 2019-03-29 RX ORDER — LEVOFLOXACIN 500 MG/1
500 TABLET, FILM COATED ORAL
Qty: 7 | Refills: 0 | Status: DISCONTINUED | COMMUNITY
Start: 2019-03-29 | End: 2019-03-29

## 2019-04-03 ENCOUNTER — APPOINTMENT (OUTPATIENT)
Dept: CARDIOLOGY | Facility: CLINIC | Age: 64
End: 2019-04-03
Payer: COMMERCIAL

## 2019-04-03 VITALS
SYSTOLIC BLOOD PRESSURE: 100 MMHG | HEART RATE: 77 BPM | BODY MASS INDEX: 26.37 KG/M2 | HEIGHT: 67 IN | WEIGHT: 168 LBS | OXYGEN SATURATION: 100 % | DIASTOLIC BLOOD PRESSURE: 62 MMHG

## 2019-04-03 DIAGNOSIS — J18.9 PNEUMONIA, UNSPECIFIED ORGANISM: ICD-10-CM

## 2019-04-03 PROCEDURE — 99214 OFFICE O/P EST MOD 30 MIN: CPT

## 2019-04-03 RX ORDER — PANTOPRAZOLE 40 MG/1
40 TABLET, DELAYED RELEASE ORAL
Refills: 3 | Status: DISCONTINUED | COMMUNITY
Start: 2018-12-05 | End: 2019-04-03

## 2019-04-03 RX ORDER — MULTIVITAMIN/IRON/FOLIC ACID 18MG-0.4MG
400 TABLET ORAL 3 TIMES DAILY
Qty: 90 | Refills: 0 | Status: DISCONTINUED | COMMUNITY
Start: 2018-06-14 | End: 2019-04-03

## 2019-04-03 RX ORDER — SPIRONOLACTONE 50 MG/1
50 TABLET ORAL TWICE DAILY
Qty: 60 | Refills: 0 | Status: DISCONTINUED | COMMUNITY
Start: 2018-12-05 | End: 2019-04-03

## 2019-04-03 RX ORDER — MILRINONE LACTATE 0.2 MG/ML
20-5 INJECTION, SOLUTION INTRAVENOUS
Refills: 0 | Status: DISCONTINUED | COMMUNITY
End: 2019-04-03

## 2019-04-03 RX ORDER — METOPROLOL SUCCINATE 25 MG/1
25 TABLET, EXTENDED RELEASE ORAL
Qty: 15 | Refills: 0 | Status: DISCONTINUED | COMMUNITY
Start: 2019-01-30 | End: 2019-04-03

## 2019-04-03 RX ORDER — LINAGLIPTIN 5 MG/1
5 TABLET, FILM COATED ORAL DAILY
Refills: 0 | Status: DISCONTINUED | COMMUNITY
Start: 2018-06-03 | End: 2019-04-03

## 2019-04-03 RX ORDER — CALCIUM ACETATE 667 MG/1
667 CAPSULE ORAL 3 TIMES DAILY
Refills: 0 | Status: DISCONTINUED | COMMUNITY
Start: 2019-01-30 | End: 2019-04-03

## 2019-04-03 RX ORDER — HYDRALAZINE HYDROCHLORIDE 10 MG/1
10 TABLET ORAL EVERY 8 HOURS
Qty: 270 | Refills: 0 | Status: DISCONTINUED | COMMUNITY
Start: 2019-03-13 | End: 2019-04-03

## 2019-04-03 RX ORDER — BUMETANIDE 2 MG/1
2 TABLET ORAL
Qty: 180 | Refills: 0 | Status: DISCONTINUED | COMMUNITY
Start: 2018-12-05 | End: 2019-04-03

## 2019-04-03 NOTE — PROVIDER CONTACT NOTE (CRITICAL VALUE NOTIFICATION) - NS PROVIDER READ BACK
4 Eyes Admission Assessment     I agree as the admission nurse that 2 RN's have performed a thorough Head to Toe Skin Assessment on the patient. ALL assessment sites listed below have been assessed on admission. Areas assessed by both nurses: ***  [x]   Head, Face, and Ears   [x]   Shoulders, Back, and Chest  [x]   Arms, Elbows, and Hands   [x]   Coccyx, Sacrum, and Ischum  [x]   Legs, Feet, and Heels        Does the Patient have Skin Breakdown? Patient has small open area above sacrum, on paras prominence.    Larry Prevention initiated: YES  Wound Care Orders initiated: YES      [facility] nurse consulted for Pressure Injury (Stage 3,4, Unstageable, DTI, NWPT, and Complex wounds):  YES  Nurse 1 eSignature: Electronically signed by Gris Brito RN on 4/3/19 at 5:06 AM    **SHARE this note so that the co-signing nurse is able to place an eSignature**    Nurse 2 eSignature: {Esignature:[id number]} yes

## 2019-04-04 LAB
ALBUMIN SERPL ELPH-MCNC: 3.4 G/DL
ALP BLD-CCNC: 546 U/L
ALT SERPL-CCNC: 99 U/L
ANION GAP SERPL CALC-SCNC: 19 MMOL/L
AST SERPL-CCNC: 152 U/L
BILIRUB SERPL-MCNC: 6.8 MG/DL
BUN SERPL-MCNC: 28 MG/DL
CALCIUM SERPL-MCNC: 8.9 MG/DL
CHLORIDE SERPL-SCNC: 83 MMOL/L
CO2 SERPL-SCNC: 31 MMOL/L
CREAT SERPL-MCNC: 1.08 MG/DL
GLUCOSE SERPL-MCNC: 212 MG/DL
NT-PROBNP SERPL-MCNC: 1116 PG/ML
POTASSIUM SERPL-SCNC: 2.9 MMOL/L
PROT SERPL-MCNC: 7.6 G/DL
SODIUM SERPL-SCNC: 133 MMOL/L

## 2019-04-04 NOTE — HISTORY OF PRESENT ILLNESS
[FreeTextEntry1] : Ms. Carson is a 62 y/o AA woman with NICM (LVEF now 9%, LVIDd 6.1 cm), initially identified in 2013, s/p CRT-D (6/2017), Afib (on Eliquis), Mod-Severe MR, Severe TR, HTN, HLD, DM II, Thyroid Cancer s/p Resection, Surgical Hypoparathyroidism with Chronic Hypocalcemia, and multiple hospitalizations over the past year for ADHF, now inotrope dependent and was on home milrinone infusion via PICC line.\par Despite close outpatient follow up and escalation of diuretics she became progressively volume overloaded and symptomatic leading to admission 2/27-3/4 again for ADHF. Of note during that admission she admitted to medication noncompliance. \par She presented to the ER 3/19/19 s/p a mechanical fall at home, states her cane got caught and she tripped down the stairs.  She denies any LOC, there was a head laceration requiring staples and a SDH, her aspirin and eliquis have been on hold. Noted elevated WBC 60, bacteremia likely line related, PICC was removed and she was treated with IV antibiotics.\par During admission she expressed she did not want another PICC, for fear for another infection and refused to go home on milrinone.  Multiple discussions with HF,  inpatient teams, and palliative care.  She continued to state she wanted the milrinone dc'd, her family were in agreement.  Despite medical advice, the milrinone was tapered off, her condition worsened lactate was elevated to 8.6, hypotensive, cardiac meds were held and Ms Carson signed out AMA.  She was discharged on  a 7 day course of levaquin, bumex 8mg bid, off BB, ARB/ACE and aldactone k at dc 5.4.  DC weight 177\par \par She presents today for follow up, she states feeling ok, though appears fatigued and weak. She denies any significant sob, orthopnea or PND.  Denies chest pain, palpitations or ICD shocks. She has been walking on her treadmill for about 20min the last few days.  She states her appetite is good, no abdominal bloating or discomfort. She has a cough which she is attributing to post nasal drip. She denies any dizziness or lightheadedness, SBP at home ranging in the 90's. \par She states that she needs to get stronger and did not want the PICC as no one knew she had an infection, when questioned whether she had a fever prior to the fall, she states she had a fever and chills for 2 days prior, though did not report this to our office. \par \par

## 2019-04-04 NOTE — REASON FOR VISIT
[Follow-Up - From Hospitalization] : follow-up of a recent hospitalization for [Heart Failure] : congestive heart failure [Spouse] : spouse [Discharge Date: ___] : Discharge Date: [unfilled]

## 2019-04-04 NOTE — DISCUSSION/SUMMARY
[Patient] : the patient [FreeTextEntry1] : 62 y/o AA woman with dilated NICM (LVEF 9%, LVIDd 6.1 cm), s/p CRT-D, Afib (on Eliquis), Mod-Severe MR, Severe TR, HTN, HLD, DM II, Thyroid Cancer s/p Resection, Surgical Hypoparathyroidism with Chronic Hypocalcemia, and multiple hospitalizations over the past year for ADHF, was on Milrinone 0.5mcg.  Recent admission s/p a fall at home, lines sepsis, treated with antibiotics, patient refused to go home on milrinone and signed out AMA. She is ACC/AHA Stage D, NYHA class III, mild volume overload, low normotensive.  Discussed with MsAlex Yamileth she is at risk for decompensation.  I have recommended the following:\par \par 1. ACC/AHA Stage D Heart Failure - \par - At risk for decompensation, given low flow state willl continue to hold cardiac\par - Continue Bumex 8mg bid. \par - K 2.9, SCR 1.08 today add back Aldactone 25mg bid\par - Continue daily weights and BP check\par - Report any changes in condition promptly\par - She is not a candidate for advanced therapies given her poor insight and poor compliance.\par \par 3. RTC every 2 weeks with NP and 8 weeks with Dr Gallego

## 2019-04-04 NOTE — PHYSICAL EXAM
[Eyelids - No Xanthelasma] : the eyelids demonstrated no xanthelasmas [No Oral Pallor] : no oral pallor [No Oral Cyanosis] : no oral cyanosis [] : no respiratory distress [Respiration, Rhythm And Depth] : normal respiratory rhythm and effort [Auscultation Breath Sounds / Voice Sounds] : lungs were clear to auscultation bilaterally [Heart Rate And Rhythm] : heart rate and rhythm were normal [Heart Sounds] : normal S1 and S2 [1+] : left 1+ [Bowel Sounds] : normal bowel sounds [Abdomen Soft] : soft [Abdomen Tenderness] : non-tender [Nail Clubbing] : no clubbing of the fingernails [Cyanosis, Localized] : no localized cyanosis [Skin Color & Pigmentation] : normal skin color and pigmentation [Oriented To Time, Place, And Person] : oriented to person, place, and time [Affect] : the affect was normal [Mood] : the mood was normal [General Appearance - In No Acute Distress] : no acute distress [FreeTextEntry1] : uses cane

## 2019-04-13 NOTE — PROGRESS NOTE ADULT - PROBLEM SELECTOR PLAN 2
Attempted to contact pt at  number, no answer. Unable Lvm for pt to return call to office at [phone] . Will continue to try to contact pt. Diuresis as above  Closely monitor BMP BID

## 2019-04-17 ENCOUNTER — APPOINTMENT (OUTPATIENT)
Dept: CARDIOLOGY | Facility: CLINIC | Age: 64
End: 2019-04-17
Payer: COMMERCIAL

## 2019-04-17 VITALS
HEART RATE: 92 BPM | SYSTOLIC BLOOD PRESSURE: 101 MMHG | BODY MASS INDEX: 28.56 KG/M2 | OXYGEN SATURATION: 100 % | WEIGHT: 182 LBS | DIASTOLIC BLOOD PRESSURE: 72 MMHG | HEIGHT: 67 IN

## 2019-04-17 PROCEDURE — 99214 OFFICE O/P EST MOD 30 MIN: CPT

## 2019-04-17 RX ORDER — APIXABAN 5 MG/1
5 TABLET, FILM COATED ORAL TWICE DAILY
Qty: 60 | Refills: 0 | Status: COMPLETED | COMMUNITY
Start: 2019-03-13 | End: 2019-04-17

## 2019-04-18 NOTE — PHYSICAL EXAM
[General Appearance - In No Acute Distress] : no acute distress [Eyelids - No Xanthelasma] : the eyelids demonstrated no xanthelasmas [No Oral Pallor] : no oral pallor [No Oral Cyanosis] : no oral cyanosis [Respiration, Rhythm And Depth] : normal respiratory rhythm and effort [] : no respiratory distress [Heart Rate And Rhythm] : heart rate and rhythm were normal [Auscultation Breath Sounds / Voice Sounds] : lungs were clear to auscultation bilaterally [Heart Sounds] : normal S1 and S2 [1+] : left 1+ [Bowel Sounds] : normal bowel sounds [Abdomen Soft] : soft [Abdomen Tenderness] : non-tender [Affect] : the affect was normal [Oriented To Time, Place, And Person] : oriented to person, place, and time [Mood] : the mood was normal [FreeTextEntry1] : uses cane [Nail Clubbing] : no clubbing of the fingernails [Cyanosis, Localized] : no localized cyanosis [Skin Color & Pigmentation] : normal skin color and pigmentation [Skin Turgor] : normal skin turgor

## 2019-04-18 NOTE — DISCUSSION/SUMMARY
[Patient] : the patient [FreeTextEntry1] : 64 y/o AA woman with dilated NICM (LVEF 9%, LVIDd 6.1 cm), s/p CRT-D, Afib (on Eliquis), Mod-Severe MR, Severe TR, HTN, HLD, DM II, Thyroid Cancer s/p Resection, Surgical Hypoparathyroidism with Chronic Hypocalcemia, and multiple hospitalizations over the past year for ADHF, was on Milrinone 0.5mcg.  Recent admission s/p a fall at home, line sepsis, treated with antibiotics, patient refused to go home on milrinone and signed out AMA. She is ACC/AHA Stage D, NYHA class III. Today appears volume overloaded, low normotensive.  Discussed with Ms. Carson she is at risk for decompensation, she does not want to be admitted. She remains unrealistic regarding her cardiac condition.  I have recommended the following:\par \par 1. ACC/AHA Stage D Heart Failure - \par - 80mg IV lasix X 1 today in clinic\par - Confirm medications with daughter Diane\par - At risk for decompensation, given low flow state, will trial low dose of hydralazine next week\par - Continue Bumex 8mg bid. \par - K 5.4, confirm aldactone dose of 50mg  and will decrease to 25mg daily\par - Continue daily weights and BP check\par - Report any changes in condition promptly\par - She is not a candidate for advanced therapies given her poor insight and poor compliance.\par \par 3. RTC every 4 weeks with NP and 2 months with Dr Gallego

## 2019-04-18 NOTE — REASON FOR VISIT
[Heart Failure] : congestive heart failure [Discharge Date: ___] : Discharge Date: [unfilled] [Follow-Up - Clinic] : a clinic follow-up of [Spouse] : spouse

## 2019-04-18 NOTE — HISTORY OF PRESENT ILLNESS
[FreeTextEntry1] : Ms. Carson is a 62 y/o AA woman with NICM (LVEF now 9%, LVIDd 6.1 cm), initially identified in 2013, s/p CRT-D (6/2017), Afib (on Eliquis), Mod-Severe MR, Severe TR, HTN, HLD, DM II, Thyroid Cancer s/p Resection, Surgical Hypoparathyroidism with Chronic Hypocalcemia, and multiple hospitalizations over the past year for ADHF, now inotrope dependent and was on home milrinone infusion via PICC line.\par Despite close outpatient follow up and escalation of diuretics she became progressively volume overloaded and symptomatic leading to admission 2/27-3/4 again for ADHF. Of note during that admission she admitted to medication noncompliance. \par She presented to the ER 3/19/19 s/p a mechanical fall at home, states her cane got caught and she tripped down the stairs.  She denies any LOC, there was a head laceration requiring staples and a SDH, her aspirin and eliquis have been on hold. Noted elevated WBC 60, bacteremia likely line related, PICC was removed and she was treated with IV antibiotics.\par During admission she expressed she did not want another PICC, for fear for another infection and refused to go home on milrinone.  Multiple discussions with HF,  inpatient teams, and palliative care.  She continued to state she wanted the milrinone dc'd, her family were in agreement.  Despite medical advice, the milrinone was tapered off, her condition worsened lactate was elevated to 8.6, hypotensive, cardiac meds were held and Ms Carson signed out AMA.  She was discharged on  a 7 day course of levaquin, bumex 8mg bid, off BB, ARB/ACE and aldactone k at dc 5.4.  DC weight 177\par \par She presents today for follow up, last visit K was 2.9 and we added back the aldactone, there were no other changes given low flow state. Today she states feeling ok, though more tired and easily fatigued She denies any significant sob, orthopnea or PND.  Denies chest pain, palpitations or ICD shocks. She has not been doing her treadmill due to fatigue, she tends to oversimplify her symptoms.  She states her appetite is good, no abdominal bloating or discomfort. She has a cough which she is attributing to post nasal drip. She denies any dizziness or lightheadedness, SBP at home ranging in the 90's. Mr. Carson expressed he feels she is getting weaker, of note she does not allow her  to be present in the exam room. She was seen yesterday by her endo who was concerned for a low sodium level of 125, which I expressed has been an ongoing problem.  Ms. Carson is unsure what amount of diuretics she is taking or if she has taken any Zaroxolyn. 4/16/19 labs K 5.4, BUN 64 SCR 1.8.  Weight is up 14lbs since last visit. \par \par \par

## 2019-05-08 ENCOUNTER — APPOINTMENT (OUTPATIENT)
Dept: CARDIOLOGY | Facility: CLINIC | Age: 64
End: 2019-05-08

## 2019-05-10 RX ORDER — TORSEMIDE 20 MG/1
20 TABLET ORAL
Qty: 240 | Refills: 0 | Status: ACTIVE | COMMUNITY
Start: 2019-04-03 | End: 1900-01-01

## 2019-05-10 RX ORDER — FUROSEMIDE 10 MG/ML
10 INJECTION, SOLUTION INTRAMUSCULAR; INTRAVENOUS
Refills: 0 | Status: COMPLETED | COMMUNITY
Start: 2019-04-17 | End: 2019-05-10

## 2019-05-15 ENCOUNTER — APPOINTMENT (OUTPATIENT)
Dept: CARDIOLOGY | Facility: CLINIC | Age: 64
End: 2019-05-15

## 2019-06-11 NOTE — DIETITIAN INITIAL EVALUATION ADULT. - PROBLEM SELECTOR PROBLEM 3
We will call with test results and recommendations    Change lisinopril to 20 mg daily.      
Hyperkalemia

## 2019-07-03 ENCOUNTER — APPOINTMENT (OUTPATIENT)
Dept: CARDIOLOGY | Facility: CLINIC | Age: 64
End: 2019-07-03

## 2019-08-13 NOTE — ED PROVIDER NOTE - CHIEF COMPLAINT
Subjective     Daljit Robb is a 11 y.o. male who is brought in for this well-child visit.  Mother reports concern for diabetes.  She has noted rapid weight gain over the past 3 months, approximately 20 pounds.  She also notes that they checked his blood sugar one day with his grandfather's glucometer and it is was close to 300.  She also notes that patient drinks quite a bit during the day.  This includes sugary drinks such as soda and koolaid.      History was provided by the mother and patient.     Immunization History   Administered Date(s) Administered   • DTaP 01/12/2012, 06/28/2013   • Hepatitis A 04/11/2012, 06/28/2013   • HiB 01/12/2012   • IPV 06/28/2013   • MMR 12/12/2011   • MMRV 06/28/2013   • Pneumococcal Conjugate 13-Valent (PCV13) 01/12/2012   • Varicella 12/12/2011     The following portions of the patient's history were reviewed and updated as appropriate: allergies, current medications, past family history, past medical history, past social history, past surgical history and problem list.    Current Issues:  Current concerns include:  Weight gain.  Currently menstruating? not applicable  Does patient snore? sometimes removed adenoids when he was younger, which improved snoring.      Review of Nutrition:  Current diet: Home cooked meals.  He does not prefer to eat vegetables, but will eat them if told.   Balanced diet? yes    Social Screening:  Sibling relations: sisters: 2  Discipline concerns? sometimes defiant   Concerns regarding behavior with peers? difficulty making friends.   School performance: doing well; no concerns  Secondhand smoke exposure? yes - mom, doesnt smoke in house.  Ocassionally in the car.     Review of Systems   Constitutional: Positive for appetite change and unexpected weight change. Negative for activity change, chills and fever.   HENT: Negative.    Eyes: Negative.    Respiratory: Negative for cough and shortness of breath.    Cardiovascular: Negative.   "  Gastrointestinal: Negative.  Negative for constipation, diarrhea and nausea.   Endocrine: Positive for polydipsia and polyphagia. Negative for polyuria.   Genitourinary: Negative.    Musculoskeletal: Negative for arthralgias, gait problem and myalgias.   Skin: Negative.    Allergic/Immunologic: Negative.    Neurological: Positive for speech difficulty (followed by speech therapy. ). Negative for dizziness and light-headedness.   Hematological: Negative for adenopathy. Does not bruise/bleed easily.   Psychiatric/Behavioral: Negative for behavioral problems and sleep disturbance.       Objective     Vitals:    08/13/19 1719   BP: (!) 106/76   Pulse: 87   Resp: 20   Temp: 98.2 °F (36.8 °C)   SpO2: 97%   Weight: 56.2 kg (124 lb)   Height: 146.1 cm (57.5\")       Growth parameters are noted and are appropriate for age.    Clothing Status fully clothed   General:   alert, appears stated age and cooperative   Gait:   normal   Skin:   normal   Oral cavity:   lips, mucosa, and tongue normal; teeth and gums normal   Eyes:   sclerae white, pupils equal and reactive   Ears:   Right:  external canal normal, TM mildly erythematous.  Previous tube placement noted.  Left:  External canal normal, TM has scarring.  No erythema.    Neck:   no adenopathy, no carotid bruit, no JVD, supple, symmetrical, trachea midline and thyroid: enlarged   Lungs:  clear to auscultation bilaterally   Heart:   regular rate and rhythm, S1, S2 normal, no murmur, click, rub or gallop   Abdomen:  soft, non-tender; bowel sounds normal; no masses,  no organomegaly   :  exam deferred   Master stage:      Extremities:  extremities normal, atraumatic, no cyanosis or edema   Neuro:  normal without focal findings, mental status, speech normal, alert and oriented x3, BRENDEN and reflexes normal and symmetric     Assessment/Plan     Healthy 11 y.o. male child.     Blood Pressure Risk Assessment    Child with specific risk conditions or change in risk No   Action NA "   Vision Assessment    Do you have concerns about how your child sees? No   Do your child's eyes appear unusual or seem to cross, drift, or lazy? No   Do your child's eyelids droop or does one eyelid tend to close? No   Have your child's eyes ever been injured? No   Dose your child hold objects close when trying to focus? No   Action NA   Hearing Assessment    Do you have concerns about how your child hears? Yes   Do you have concerns about how your child speaks?  Yes   Action NA and has speech at school    Tuberculosis Assessment    Has a family member or contact had tuberculosis or a positive tuberculin skin test? No   Was your child born in a country at high risk for tuberculosis (countries other than the [place], [place], [place], [place], or [place]?) No   Has your child traveled (had contact with resident populations) for longer than 1 week to a country at high risk for tuberculosis? No   Is your child infected with HIV? No   Action NA   Anemia Assessment    Do you ever struggle to put food on the table? No   Does your child's diet include iron-rich foods such as meat, eggs, iron-fortified cereals, or beans? No   Action NA   Oral Health Assessment:    Does your child have a dentist? Yes   Does your child's primary water source contain fluoride? Yes   Action NA   Dyslipidemia Assessment    Does your child have parents or grandparents who have had a stroke or heart problem before age 55? No   Does your child have a parent with elevated blood cholesterol (240 mg/dL or higher) or who is taking cholesterol medication? No   Action: ELEUTERIO      Daljit was seen today for well child.    Diagnoses and all orders for this visit:    Encounter for well child visit at 11 years of age    Overweight, pediatric  -     Lipid Panel    Elevated glucose  -     Comprehensive Metabolic Panel  -     CBC & Differential  -     Lipid Panel  -     Hemoglobin A1c    Rapid weight gain  -     TSH+Free T4    Thyromegaly  -      TSH+Free T4    Lipid screening  -     Lipid Panel      1. Anticipatory guidance discussed.  Gave handout on well-child issues at this age.  Specific topics reviewed: bicycle helmets, chores and other responsibilities, drugs, ETOH, and tobacco, importance of regular dental care, importance of regular exercise, importance of varied diet, minimize junk food and seat belts.    2.  Weight management:  The patient was counseled regarding nutrition and physical activity.    3. Development: appropriate for age    4. Immunizations today: Patient is due for Dtap, IPV, Hep B, and flu.   WIll obtain at the health department.      5.  With symptoms or rapid weight gain, thyromegaly, and at home reported elevated BGL, will obtain labs as noted above.  Continued to encourage mother and patient to improve dietary habits, including limiting sugary beverages and increasing water intake.      6. Follow-up visit in 1 year for next well child visit, or sooner as needed.  Will contact mother when labs are resulted.           The patient is a 62y Female complaining of sob

## 2019-11-06 NOTE — PATIENT PROFILE ADULT. - NS PRO OT REFERRAL QUES 2 YN
I have personally provided the amount of critical care time documented below concurrently with the resident/fellow.  This time excludes time spent on separate procedures and time spent teaching. I have reviewed the resident’s/fellow’s documentation and I agree with the assessment and plan of care
no

## 2019-12-19 NOTE — CONSULT NOTE ADULT - ATTENDING COMMENTS
EP ATTENDING    Agree with above. Admitted with acute on chronic systolic CHF from a known NICM s/p [employer] BiV-ICD. I will review the recent device interrogation to make sure the BiV is optimized. Medical management of CHF as per cardiology. Quinolones Counseling:  I discussed with the patient the risks of fluoroquinolones including but not limited to GI upset, allergic reaction, drug rash, diarrhea, dizziness, photosensitivity, yeast infections, liver function test abnormalities, tendonitis/tendon rupture.

## 2020-04-21 NOTE — PROGRESS NOTE ADULT - ATTENDING COMMENTS
Yes Patient seen and examined.  Agree with above.   -continue with inotropic support with milrinone  -off bumex  -follow up heart failure  -supportive care per CCU    Kunal Muller MD

## 2020-05-16 NOTE — DISCHARGE NOTE ADULT - CARE PROVIDERS DIRECT ADDRESSES
You can access the FollowMyHealth Patient Portal offered by [facility] by registering at the following website: [email or web address] By joining [email or web address]’s FollowMyHealth portal, you will also be able to view your health information using other applications (apps) compatible with our system. ,DirectAddress_Unknown ,DirectAddress_Unknown,[email or web address],DirectAddress_Unknown

## 2020-06-04 NOTE — DISCHARGE NOTE ADULT - ADMISSION DATE +STARTOFVISITDATE
Statement Selected Xolair Counseling:  Patient informed of potential adverse effects including but not limited to fever, muscle aches, rash and allergic reactions.  The patient verbalized understanding of the proper use and possible adverse effects of Xolair.  All of the patient's questions and concerns were addressed.

## 2020-06-15 NOTE — DIETITIAN INITIAL EVALUATION ADULT. - PROBLEM SELECTOR PROBLEM 7
Type 2 diabetes mellitus with hyperglycemia, without long-term current use of insulin
Patient/Parent

## 2020-07-20 NOTE — ED ADULT TRIAGE NOTE - BP NONINVASIVE DIASTOLIC (MM HG)
Abdomen , soft, nontender, nondistended , no guarding or rigidity , no masses palpable , normal bowel sounds , Liver and Spleen , no hepatomegaly present , no hepatosplenomegaly , liver nontender , spleen not palpable 86

## 2020-09-02 NOTE — BEHAVIORAL HEALTH ASSESSMENT NOTE - EMPLOYMENT
Leftname and callback number and mychart message for patient to contact the office for rescheduling. Retired

## 2020-11-10 NOTE — ED PROVIDER NOTE - CROS ED EYES ALL NEG
Pt combative with cares. Hitting with left hand while obtaining vitals. Grabbing at caretakers arms and hands and not letting go. Continues to be altered.    negative...

## 2021-06-04 NOTE — ED ADULT NURSE NOTE - NS ED NURSE RECORD ANOTHER VITAL SIGN
Patient Education     Preventing Vaginitis     Use mild, unscented soap when you bathe or shower to avoid irritating your vagina.    Vaginitis is irritation or infection of the vagina or vulva (the outside opening of the vagina). Vaginitis can be caused by bacteria, viruses, parasites, or yeast. Chemicals (such as in perfumes or soaps or in spermicides) can sometimes be a cause. Vaginitis can be caused by hormone changes in pregnancy or with menopause. You can help prevent vaginitis. Follow the tips below. And see your healthcare provider if you have any symptoms.  Hygiene  · Avoid chemicals. Do not use vaginal sprays. Do not use scented toilet paper or tampons that are scented. Sprays and scents have chemicals that can irritate your vagina.  · Do not douche unless you are told to by your healthcare provider. Douching is rarely needed. And it upsets the normal balance in the vagina.  · Wash yourself well. Wash the outer vaginal area (vulva) every day with mild, unscented soap. Keep it as dry as possible.  · Wipe correctly. Make sure to wipe from front to back after a bowel movement. This helps keep from spreading bacteria from your anus to your vagina.  · Change your tampon often. During your period, make sure to change your tampon as often as directed on the package. This allows the normal flow of vaginal discharge and blood.  Lifestyle  · Limit your number of sexual partners. The more partners you have, the greater your risk of infection. Using condoms helps reduce your risk.  · Get enough sleep. Sleep helps keep your body’s immune system healthy. This helps you fight infection.  · Lose weight, if needed. Excess weight can reduce air circulation around your vagina. This can increase your risk of infection.  · Exercise regularly. Regular activity helps keep your body healthy.  · Take antibiotics only as directed. Antibiotics can change the normal chemical balance in the vagina.    Clothing  · Don’t sit in wet  clothes. Yeast thrives when it’s warm and damp.  · Don’t wear tight pants. And don’t wear tights, leggings, or hose without a cotton crotch. These types of clothing trap warmth and moisture.  · Wear cotton underwear. Cotton lets air circulate around the vagina.  Symptoms of vaginitis  · Irritation, swelling, or itching of the genital area  · Vaginal discharge  · Bad vaginal odor  · Pain or burning during urination   Date Last Reviewed: 12/1/2016 © 2000-2018 [employer]. [address] [place] [address]. All rights reserved. This information is not intended as a substitute for professional medical care. Always follow your healthcare professional's instructions.              Yes

## 2022-03-02 NOTE — H&P ADULT - PSH
Medication:   Requested Prescriptions     Pending Prescriptions Disp Refills    labetalol (NORMODYNE) 100 MG tablet [Pharmacy Med Name: LABETALOL 100MG TABLETS] 180 tablet 0     Sig: TAKE 1 TABLET BY MOUTH TWICE DAILY        Last Filled:      Patient Phone Number: [phone] (home)     Last appt: 10/8/2021   Next appt: Visit date not found    Last OARRS:   RX Monitoring 1/11/2022   Attestation -   Periodic Controlled Substance Monitoring No signs of potential drug abuse or diversion identified. S/P thyroidectomy

## 2022-04-05 NOTE — DISCHARGE NOTE ADULT - PRINCIPAL DIAGNOSIS
Primary Defect Length In Cm (Final Defect Size - Required For Flaps/Grafts): 0.8 Acute on chronic systolic congestive heart failure

## 2022-07-03 NOTE — ED ADULT TRIAGE NOTE - TEMPERATURE IN FAHRENHEIT (DEGREES F)
Anesthesia Post Evaluation    Patient: Karin Urrutia    Procedure(s) Performed: Procedure(s) (LRB):  DEBRIDEMENT, LOWER EXTREMITY (Right)    Final Anesthesia Type: general      Patient location during evaluation: PACU  Patient participation: Yes- Able to Participate  Level of consciousness: awake and alert  Post-procedure vital signs: reviewed and stable  Pain management: adequate  Airway patency: patent  TOMMIE mitigation strategies: Multimodal analgesia  PONV status at discharge: No PONV  Anesthetic complications: no      Cardiovascular status: blood pressure returned to baseline  Respiratory status: unassisted  Hydration status: euvolemic  Follow-up not needed.          Vitals Value Taken Time   /63 07/03/22 0036   Temp 38.7 °C (101.7 °F) 07/03/22 0036   Pulse 81 07/03/22 0036   Resp 18 07/03/22 0036   SpO2 97 % 07/03/22 0036         Event Time   Out of Recovery 07/02/2022 13:06:00         Pain/George Score: Pain Rating Prior to Med Admin: 7 (7/3/2022 12:36 AM)  Pain Rating Post Med Admin: 3 (7/2/2022 10:07 AM)  George Score: 10 (7/2/2022  1:05 PM)        
98.2

## 2022-07-12 NOTE — ED ADULT NURSE NOTE - PATIENT DISCHARGE SIGNATURE
Patient comes in by EMS complaining of right sided sciatica which he has been seen multiple times for in the last several weeks. Patient also states that he was having a bowel movement earlier today prior to arrival and when he stood up, he fell back into the wall and broke the commode. Patient was placed in a c-collar by EMS even though patient denies neck and aback pain. 13-Sep-2018

## 2022-08-11 NOTE — PROVIDER CONTACT NOTE (CRITICAL VALUE NOTIFICATION) - NAME OF MD/NP/PA/DO NOTIFIED:
Subjective:       Patient ID: Hali Oquendo is a 68 y.o. male.    Chief Complaint: Other (3 month)      HPI:  68-year-old male with prostate cancer treated with proton therapy and hormonal therapy completed this in 2019 since that time his PSA has remained nearly undetectable it is now detectable but remains quite low February 22 was 0.186 it then decreased in June of 2020 2-0.107.  Patient is quite interested in continuing to check his PSA with some frequency    Past Medical History:   Past Medical History:   Diagnosis Date    Asthma     Clinical depression     DVT (deep venous thrombosis)     HLD (hyperlipidemia)     HTN (hypertension)     Hypothyroidism     Prostate cancer     Sleep apnea     Vitamin B deficiency     Vitamin D deficiency        Past Surgical Historical:   Past Surgical History:   Procedure Laterality Date    CATARACT EXTRACTION Right     EYE SURGERY Right     retina    HERNIA REPAIR  2003    TONSILLECTOMY  1977        Medications:   Medication List with Changes/Refills   Current Medications    BUDESONIDE-FORMOTEROL 160-4.5 MCG (SYMBICORT) 160-4.5 MCG/ACTUATION HFAA    Inhale 2 puffs into the lungs. Controller/ use 2 times q d in am and evening.    BUPROPION (WELLBUTRIN XL) 300 MG 24 HR TABLET    Take 300 mg by mouth once daily.    CYANOCOBALAMIN 2000 MCG TABLET    Take 1 tablet (2,000 mcg total) by mouth once daily. 50, 000 iu    ERGOCALCIFEROL (VITAMIN D2) 50,000 UNIT CAP    Take 1 capsule (50,000 Units total) by mouth every 7 days.    EZETIMIBE (ZETIA) 10 MG TABLET    Take 1 tablet (10 mg total) by mouth once daily.    L-METHYL-B6-B12 3-35-2 MG TAB    TAKE 1 TABLET BY MOUTH DAILY    LANSOPRAZOLE (PREVACID) 30 MG CAPSULE    Take 1 capsule (30 mg total) by mouth once daily.    LEVOTHYROXINE (SYNTHROID) 75 MCG TABLET    TAKE 1 TABLET BY MOUTH EVERY DAY BEFORE BREAKFAST    LEVOTHYROXINE (SYNTHROID) 88 MCG TABLET    Take 1 tablet (88 mcg total) by mouth before breakfast.     LIOTHYRONINE (CYTOMEL) 25 MCG TAB        LOSARTAN (COZAAR) 100 MG TABLET    Take 1 tablet (100 mg total) by mouth once daily.    METOPROLOL SUCCINATE (TOPROL-XL) 100 MG 24 HR TABLET    Take 100 mg by mouth 2 (two) times daily.    MONTELUKAST (SINGULAIR) 10 MG TABLET    TAKE 1 TABLET (10 MG TOTAL) BY MOUTH EVERY EVENING AS NEEDED    OLANZAPINE (ZYPREXA) 5 MG TABLET    Take 5 mg by mouth once daily.    ORACEA 40 MG CAPSULE    Take 40 mg by mouth every morning. before breakfast.    RIVAROXABAN (XARELTO) 20 MG TAB    Take 20 mg by mouth once daily.    ROSUVASTATIN (CRESTOR) 5 MG TABLET    Take 1 tablet (5 mg total) by mouth every evening.    TADALAFIL (CIALIS) 20 MG TAB    Take 20 mg by mouth once daily. 12 to 20 mg @@hs    TAMSULOSIN (FLOMAX) 0.4 MG CAP    Take 0.4 mg by mouth 2 (two) times a day.    VASCEPA 1 GRAM CAP    TAKE 2 CAPSULES BY MOUTH 2 TIMES DAILY.    VENLAFAXINE (EFFEXOR-XR) 150 MG CP24    Take 300 mg by mouth once daily.    VENLAFAXINE (EFFEXOR-XR) 75 MG 24 HR CAPSULE    Take 75 mg by mouth once daily.        Past Social History:   Social History     Socioeconomic History    Marital status: Single   Tobacco Use    Smoking status: Never Smoker    Smokeless tobacco: Never Used   Substance and Sexual Activity    Alcohol use: Never    Drug use: Never    Sexual activity: Never       Allergies:   Review of patient's allergies indicates:   Allergen Reactions    Lisinopril Anaphylaxis    Hay fever and allergy relief         Family History:   Family History   Problem Relation Age of Onset    Lung cancer Father     Mental illness Other         fathers side    Alcohol abuse Mother     Hypertension Mother         Review of Systems:  Review of Systems   Constitutional: Negative for activity change and appetite change.   HENT: Negative for congestion and dental problem.    Eyes: Negative for visual disturbance.   Respiratory: Negative for chest tightness and shortness of breath.    Cardiovascular: Negative  for chest pain.   Gastrointestinal: Negative for abdominal distention and abdominal pain.   Genitourinary: Negative for decreased urine volume, difficulty urinating, dysuria, enuresis, flank pain, frequency, genital sores, hematuria, penile discharge, penile pain, penile swelling, scrotal swelling, testicular pain and urgency.   Musculoskeletal: Negative for back pain and neck pain.   Skin: Negative for color change.   Neurological: Negative for dizziness.   Hematological: Negative for adenopathy.   Psychiatric/Behavioral: Negative for agitation, behavioral problems and confusion.       Physical Exam:  Physical Exam  Constitutional:       General: He is not in acute distress.     Appearance: He is well-developed.   HENT:      Head: Normocephalic and atraumatic.      Nose: Nose normal.   Eyes:      General: No scleral icterus.     Conjunctiva/sclera: Conjunctivae normal.      Pupils: Pupils are equal, round, and reactive to light.   Neck:      Thyroid: No thyromegaly.      Trachea: No tracheal deviation.   Cardiovascular:      Rate and Rhythm: Normal rate and regular rhythm.      Heart sounds: Normal heart sounds.   Pulmonary:      Effort: Pulmonary effort is normal. No respiratory distress.      Breath sounds: Normal breath sounds. No wheezing or rales.   Abdominal:      General: Bowel sounds are normal. There is no distension.      Palpations: Abdomen is soft.      Tenderness: There is no abdominal tenderness. There is no guarding or rebound.   Genitourinary:     Penis: Normal. No tenderness.       Prostate: Normal.   Musculoskeletal:         General: No deformity. Normal range of motion.      Cervical back: Neck supple.   Lymphadenopathy:      Cervical: No cervical adenopathy.   Skin:     General: Skin is warm and dry.      Findings: No erythema or rash.   Neurological:      Mental Status: He is alert and oriented to person, place, and time.      Cranial Nerves: No cranial nerve deficit.   Psychiatric:          Behavior: Behavior normal.         Assessment/Plan:       Problem List Items Addressed This Visit        Oncology    Prostate cancer - Primary    Relevant Orders    Prostate Specific Antigen, Diagnostic    Testosterone             Prostate cancer:  Patient's approaching 5 years out from proton therapy his PSA however is only begun to be registering will see the patient back in 3 months with PSA and testosterone   Dr. Donaldson

## 2022-08-23 NOTE — ED ADULT TRIAGE NOTE - PAIN: PRESENCE, MLM
DO Felix Mcneal Andrew Do ~ [facility] Clinical Support Pool 3 minutes ago (3:08 PM)         In system thanks   Let pt know     Message text          complains of pain/discomfort

## 2022-09-21 NOTE — DIETITIAN INITIAL EVALUATION ADULT. - ETIOLOGY
PT's wife called and would like to know if surgery will be inpatient or outpatient. Also wants to know if he can fly on 10/23/22. She has to know by Friday 9/23 so she can cancel the flight for refund purposes.  Please advise wife [phone] related to poor appetite secondary to abdominal pain, nausea, vomiting related to endocrine dysfunction

## 2022-11-04 NOTE — DISCUSSION/SUMMARY
22  Jewel Rodriguez : 1942 Sex: female  Age 79 y.o. Subjective:  Chief Complaint   Patient presents with    Rash     Was itchy last week on arms now spread to the backside and down the legs. HPI:   Jewel Rodriguez , 79 y.o. female presents to express care for evaluation of rash    HPI  70-year-old female presents to express care for evaluation of a rash. The patient has had this rash ongoing for the last week or so. Last week she noted that the areas were just itchy. Over the last couple of days the rash has worsened. Seems to be both in the antecubital areas into the buttocks area. Seems to be more hive-like. The patient has not had any new soaps, detergents, lotions. No new medication. The patient denies having any fevers. Patient denies any other complaints at this time. No lip or tongue swelling. No difficulty breathing. ROS:   Unless otherwise stated in this report the patient's positive and negative responses for review of systems for constitutional, eyes, ENT, cardiovascular, respiratory, gastrointestinal, neurological, , musculoskeletal, and integument systems and related systems to the presenting problem are either stated in the history of present illness or were not pertinent or were negative for the symptoms and/or complaints related to the presenting medical problem. Positives and pertinent negatives as per HPI. All others reviewed and are negative.       PMH:     Past Medical History:   Diagnosis Date    Anxiety     Asthma     controlled per pt    Atrial fibrillation ([facility] [address] 2021    Follows with Dr Reynaldo Fowler- 2-    History of supraventricular tachycardia     after surgery    Hypertension     Hypothyroidism     Macular degeneration 2022    bilateral    PVC's (premature ventricular contractions)        Past Surgical History:   Procedure Laterality Date    APPENDECTOMY      COLONOSCOPY      LAPAROSCOPIC APPENDECTOMY N/A 2020 APPENDECTOMY LAPAROSCOPIC performed by Leighann Meraz MD at [address] [place] [address] [address] Right 3/15/2022    RIGHT KNEE TOTAL ARTHROPLASTY   ++SKY++    ++PNB++ performed by Alen Henderson MD at [facility] OR       Family History   Problem Relation Age of Onset    Other Mother 80        no health problems    Colon Cancer Father 79    Alcohol Abuse Sister         DRUG abuse    Alcohol Abuse Sister         Drug abuse       Medications:     Current Outpatient Medications:     predniSONE (DELTASONE) 10 MG tablet, 3 tabs once daily for 3 days, 2 tabs once daily for 3 days, 1 tab once daily for 3 days, Disp: 18 tablet, Rfl: 0    dilTIAZem (CARDIZEM CD) 180 MG extended release capsule, TAKE ONE CAPSULE BY MOUTH EVERY DAY, Disp: , Rfl:     levETIRAcetam (KEPPRA) 500 MG tablet, Take 1 tablet by mouth 2 times daily, Disp: 60 tablet, Rfl: 5    acetaminophen (TYLENOL) 325 mg tablet, Take 2 tablets by mouth every 6 hours as needed for Pain or Fever, Disp: 120 tablet, Rfl: 3    lisinopril (PRINIVIL;ZESTRIL) 20 MG tablet, Take 1 tablet by mouth daily, Disp: 30 tablet, Rfl: 0    Multiple Vitamins-Minerals (PRESERVISION AREDS 2) CAPS, Take 1 capsule by mouth daily, Disp: , Rfl:     docusate sodium (COLACE) 100 mg capsule, Take 100 mg by mouth 2 times daily as needed for Constipation, Disp: , Rfl:     apixaban (ELIQUIS) 5 MG TABS tablet, Take 1 tablet by mouth 2 times daily, Disp: 180 tablet, Rfl: 3    omeprazole (PRILOSEC) 40 MG delayed release capsule, Take 40 mg by mouth daily, Disp: , Rfl:     levothyroxine (SYNTHROID) 50 MCG tablet, Take 50 mcg by mouth Six times weekly , Disp: , Rfl:     Allergies:      Allergies   Allergen Reactions    Other      Ragweed and milk after allergy testing  No particular symptoms       Social History:     Social History     Tobacco Use    Smoking status: Former     Types: Cigarettes     Quit date: 2002     Years since quittin.7    Smokeless tobacco: Never   Vaping Use    Vaping Use: Never used   Substance Use Topics    Alcohol use: No     Comment: 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 Physical Exam:     Vitals:    11/04/22 1304   BP: 138/76   Pulse: 73   Temp: 98.1 °F (36.7 °C)   SpO2: 97%   Weight: 159 lb (72.1 kg)   Height: 5' 4\" (1.626 m)       Exam:  Physical Exam  Vital Signs and nurse's notes are reviewed. The patient is not hypoxic. General: Alert, no acute distress, patient resting comfortably  Skin: warm, intact, no pallor noted. The patient has evidence of a urticarial type rash noted to the bilateral antecubital area, right buttocks and thighs. the patient has no evidence of petechiae, purpura, or vesicles. The patient is no sloughing of the skin. Rash does domingo. The patient has no involvement of the palms, soles, or oral mucosa. The patient has no significant sloughing of the skin associated. Head: Normocephalic, atraumatic  Eye: Normal conjunctiva  Ears, nose, throat: moist mucous membranes, there is no involvement of the oral mucosa, there is no lip or tongue swelling. The patient has airway patent. The patient has no tonsillar hypertrophy or swelling to the posterior oropharynx. Neck: The patient has no masses, warmth, or erythema. The patient has no meningeal signs. The patient has no nuchal rigidity noted. Cardiovascular: Regular Rate and Rhythm  Respiratory: No acute distress, no tachypnea, no evidence of rhonchi, wheezing, or rales noted in bilateral lung fields. No round noted. No retractions or stridor. Abdomen: Normal bowel sounds, soft, nontender, no rebound, guarding or rigidity noted. No masses detected. Musculoskeletal: No obvious deformity, normal range of motion  Neurological: Alert and oriented x4, normal speech        Testing:           Medical Decision Making:     Vital signs reviewed    Past medical history reviewed.     Allergies reviewed. Medications reviewed. Patient on arrival does not appear to be in any apparent distress or discomfort. The patient has been seen and evaluated. The patient does not appear to be toxic or lethargic. We will treat the patient with IM injection of methylprednisone here. The patient will be started on prednisone. Patient will continue to monitor symptoms while the patient follow-up with PCP. Use antihistamine at home. The patient was comfortable with the plan. The patient is to return to express care or go directly to the emergency department should any of the signs or symptoms worsen. The patient is to followup with primary care physician in 2-3 days for repeat evaluation. The patient has no other questions or concerns at this time the patient will be discharged home. Clinical Impression:   [place] was seen today for rash. Diagnoses and all orders for this visit:    Urticaria    Rash and nonspecific skin eruption    Other orders  -     predniSONE (DELTASONE) 10 MG tablet; 3 tabs once daily for 3 days, 2 tabs once daily for 3 days, 1 tab once daily for 3 days  -     methylPREDNISolone acetate (DEPO-MEDROL) injection 40 mg      The patient is to call for any concerns or return if any of the signs or symptoms worsen. The patient is to follow-up with PCP in the next 2-3 days for repeat evaluation repeat assessment or go directly to the emergency department.      SIGNATURE: Brown Kirkpatrick III, PA-C [Patient] : the patient [___ Day(s)] : [unfilled] day(s) [With ___] : with [unfilled] [FreeTextEntry1] : Ms. Carson is a 64 y/o AA woman with dilated NICM (LVEF 9%, LVIDd 6.1 cm) s/p CRT-D, Mod-Severe MR, Severe TR, HLD, DM II, and Thyroid Cancer s/p Resection, Surgical Hypoparathyroidism with Chronic Hypocalcemia, who presents today post discharge for ADHF for follow up. She is AHA/ACC Stage D, NYHA class III, normotensive, currently volume overloaded on continuous milrinone infusion. I recommend the following:\par \par 1. Chronic systolic heart failure - Continue milrinone infusion at 0.375 mcg/kg/min. We will increase bumex to 6 mg PO BID (from 3 mg PO BID). We draw CMP, Mg, proBNP toay to reassess. She will return for follow-up on Monday with the HF NP, if not adequately diuresing with augmented oral diuretics may require admission for optimization of volume status with IV diuretics. She will continue current doses of all other medications. She will continue to monitor her weight daily and notify us if she continues to have a weight gain of 2 lbs in 2 days or 5 lbs in a week. Reinforced low sodium diet and limiting fluid to < 2 L/day.\par \par 2. DM - She will follow-up with her PCP for her diabetes. Reinforced diabetic diet and she will continue to monitor her blood sugar.

## 2022-12-21 NOTE — PROVIDER CONTACT NOTE (OTHER) - ASSESSMENT
Interval History: Admitted overnight.  Refused Remdesivir.  Feels back to baseline.  Turned off oxygen and sats remained in the 90s, goal 88-92% with COPD.  Wants to go home.  Discussed Dexamethasone and strict return precautions, Covid home monitoring.    Review of Systems   Constitutional:  Negative for chills, fatigue and fever.   Respiratory:  Negative for cough and shortness of breath.    Cardiovascular:  Negative for chest pain, palpitations and leg swelling.   Gastrointestinal:  Negative for abdominal pain, diarrhea, nausea and vomiting.   Genitourinary:  Negative for dysuria and urgency.   Neurological:  Negative for dizziness and headaches.   All other systems reviewed and are negative.  Objective:     Vital Signs (Most Recent):  Temp: 97.3 °F (36.3 °C) (12/21/22 0732)  Pulse: (!) 118 (12/21/22 0732)  Resp: 20 (12/21/22 0732)  BP: (!) 143/75 (12/21/22 0732)  SpO2: (!) 94 % (12/21/22 0732)   Vital Signs (24h Range):  Temp:  [97.3 °F (36.3 °C)-98.4 °F (36.9 °C)] 97.3 °F (36.3 °C)  Pulse:  [] 118  Resp:  [11-29] 20  SpO2:  [83 %-98 %] 94 %  BP: (115-160)/(57-90) 143/75        There is no height or weight on file to calculate BMI.    Intake/Output Summary (Last 24 hours) at 12/21/2022 1056  Last data filed at 12/21/2022 0630  Gross per 24 hour   Intake --   Output 3 ml   Net -3 ml      Physical Exam  Constitutional:       Appearance: Normal appearance. He is well-developed.   HENT:      Head: Normocephalic and atraumatic.   Cardiovascular:      Rate and Rhythm: Normal rate and regular rhythm.      Heart sounds: No murmur heard.  Pulmonary:      Effort: Pulmonary effort is normal. No respiratory distress.      Breath sounds: Normal breath sounds. No wheezing or rales.   Abdominal:      General: There is no distension.      Palpations: Abdomen is soft.      Tenderness: There is no abdominal tenderness.   Musculoskeletal:         General: No deformity.   Skin:     General: Skin is warm.   Neurological:       General: No focal deficit present.      Mental Status: He is alert. Mental status is at baseline.       Significant Labs: All pertinent labs within the past 24 hours have been reviewed.    Significant Imaging: I have reviewed all pertinent imaging results/findings within the past 24 hours.   Patient A&O x4, VSS. Last BP at 1930 was 130/95 and HR of 80 bpm. NSR on tele.

## 2023-01-31 NOTE — ED ADULT NURSE NOTE - NS ED NURSE LEVEL OF CONSCIOUSNESS ORIENTATION
Oriented - self; Oriented - place; Oriented - time Follow up with the OBGYN in 1 week  If you experience any new or worsening symptoms or if you are concerned you can always come back to the emergency for a re-evaluation.  If there were any prescriptions given to you during the visit today take them as prescribed. If you have any questions you can ask the pharmacist.

## 2023-02-21 NOTE — ED PROVIDER NOTE - DATE/TIME 3
This was a shared visit with the [facility]. I reviewed and verified the documentation and independently performed the documented:
20-Mar-2019 06:05

## 2023-02-23 NOTE — ED ADULT NURSE NOTE - CHIEF COMPLAINT QUOTE
Impression: Myopia, bilateral: H52.13. Plan: Discussed diagnosis in detail with patient. New glasses Rx was given today. Recommend annual exams. pt c/o n/v/ and diarrhea pt also reports shortness of breath, respirations even and unlabored in triage.

## 2023-04-06 NOTE — PROGRESS NOTE ADULT - PROVIDER SPECIALTY LIST ADULT
Airway       Patient location during procedure: OR       Procedure Start/Stop Times: 4/6/2023 7:48 AM  Staff -        CRNA: Anna Jacobson APRN CRNA       Performed By: CRNA  Consent for Airway        Urgency: elective  Indications and Patient Condition       Indications for airway management: grisel-procedural       Induction type:intravenous       Mask difficulty assessment: 2 - vent by mask + OA or adjuvant +/- NMBA    Final Airway Details       Final airway type: endotracheal airway       Successful airway: ETT - single  Endotracheal Airway Details        ETT size (mm): 6.5       Successful intubation technique: direct laryngoscopy       DL Blade Type: MAC 4       Grade View of Cords: 1       Adjucts: stylet       Position: Right       Measured from: lips       Secured at (cm): 21       Bite block used: None    Post intubation assessment        Placement verified by: capnometry, equal breath sounds and chest rise        Number of attempts at approach: 1       Secured with: pink tape       Ease of procedure: easy       Dentition: Intact and Unchanged    Medication(s) Administered   Medication Administration Time: 4/6/2023 7:48 AM       Inter[facility] Cardiology

## 2023-04-09 NOTE — H&P ADULT - NEGATIVE CARDIOVASCULAR SYMPTOMS
no palpitations/no paroxysmal nocturnal dyspnea/no chest pain/no orthopnea
Xray Wrist 3 Views, Right

## 2023-04-17 NOTE — PROGRESS NOTE ADULT - PROVIDER SPECIALTY LIST ADULT
Heart Failure Samuel Bravo,    This 75yo pt had a vaginal colposcopy with you on 3/17/23. Biopsies were negative. Pt had been notified of normal results but no follow up plan was outlined. Okay for repeat cotesting in 1 year?    Colpo was done due to +HPV x2. Previous hx CIS in 2006    Daisy Post RN BSN, Pap Tracking

## 2023-05-03 NOTE — PROGRESS NOTE ADULT - I WAS PHYSICALLY PRESENT FOR THE KEY PORTIONS OF THE EVALUATION AND MANAGEMENT (E/M) SERVICE PROVIDED.  I AGREE WITH THE ABOVE HISTORY, PHYSICAL, AND PLAN WHICH I HAVE REVIEWED AND EDITED WHERE APPROPRIATE
Patient presents with c/o chest pain with inspiration  Has had discomfort since December  Patient denies N/V or dyspnea  Patient does not appear in any distress, no dyspnea  Patient placed on cardiac monitor, BP cuff and pulse ox. Alarms set.        Pablo Long RN  05/03/23 1950 Statement Selected

## 2023-05-14 NOTE — PROVIDER CONTACT NOTE (OTHER) - ACTION/TREATMENT ORDERED:
Straight cath, send UA.
NP aware, will reassess BP
Np aware EKG done Continue to monitor
PA aware. Will continue to monitor.
PA aware. Xray of abdomen ordered. Call bell in reach. Will continue to monitor.
Telemetry monitoring continued.
draw STAT labs to assess patients electrolytes, pending results. will continue to monitor
declines

## 2023-08-09 NOTE — PROVIDER CONTACT NOTE (CRITICAL VALUE NOTIFICATION) - RECOMMENDATIONS
For information on Fall & Injury Prevention, visit: [email or web address] OR  [email or web address] OR  https://www.cdc.gov/steadi/patient.html Notify NP

## 2023-08-14 NOTE — PHYSICAL EXAM
[General Appearance - In No Acute Distress] : no acute distress [] : no respiratory distress [Heart Rate And Rhythm] : heart rate and rhythm were normal [Heart Sounds] : normal S1 and S2 [Arterial Pulses Normal] : the arterial pulses were normal [Bowel Sounds] : normal bowel sounds [Skin Color & Pigmentation] : normal skin color and pigmentation [Oriented To Time, Place, And Person] : oriented to person, place, and time [Affect] : the affect was normal [Well Groomed] : well groomed [General Appearance - Well Nourished] : well nourished [Eyelids - No Xanthelasma] : the eyelids demonstrated no xanthelasmas [Normal Oral Mucosa] : normal oral mucosa [No Oral Cyanosis] : no oral cyanosis [Respiration, Rhythm And Depth] : normal respiratory rhythm and effort A&Ox3  NAD, >4 METS activity  lungs:   cor;   extr: [Exaggerated Use Of Accessory Muscles For Inspiration] : no accessory muscle use [Auscultation Breath Sounds / Voice Sounds] : lungs were clear to auscultation bilaterally [Murmurs] : no murmurs present [2+] : left 2+ [Abdomen Soft] : soft [Abdomen Tenderness] : non-tender [Nail Clubbing] : no clubbing of the fingernails [Cyanosis, Localized] : no localized cyanosis [Mood] : the mood was normal [No Oral Pallor] : no oral pallor A&Ox3  NAD, >4 METS activity  lungs: clear  cor; RRR  extr: no c/c/e [FreeTextEntry1] : Right upper arm PICC site CDI, no erythema or drainage.

## 2023-08-22 NOTE — PATIENT PROFILE ADULT - NSPROGENBLOODRESTRICT_GEN_A_NUR
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    Medical Information   Related to Industrial Injury? Yes    Subjective Complaints:  DOI: 7/10/23 ELBA: Patient reports he was lifting a heavy box yesterday and he felt a pop in his low back.  Patient seen in urgent care x2 for this injury.   offered but declined by patient at this visit.  Today he reports symptoms have improved minimally.  He continues to have pain with certain movements, walking for extended period of time, and radiating pain to the middle of his thighs.  He denies leg weakness, bowel or bladder changes, or saddle anesthesia.  He is taking OTC medication, applying ice, and had some relief from the steroid pack.  He states that he did get relief from the muscle relaxer but he ran out.  He has not been doing any stretching exercises or applying heat.  He has been tolerating light duty with minimal difficulty.  Physical therapy referral placed.  Plan of care discussed with patient.  Patient denies previous back injury, surgeries, or second job.   Objective Findings: Lumbar: No gross deformity noted.  Mild tenderness to paraspinal musculature L3-S1 and right SI joint.  Neg decreased flexion or rotation.  Straight leg test negative bilaterally.  Able to heel/toe walk with minimal difficulty. Cranial nerves grossly intact.  Achilles and patellar  reflexes 2+ bilaterally.   Sensation intact. No gait abnormalities.     Pre-Existing Condition(s):     Assessment:   Condition Same    Status: Additional Care Required  Permanent Disability:No    Plan: PTMedication (NOT at Work)    Diagnostics:      Comments:  Follow-up in 2 weeks  Restricted duty  Physical therapy referral placed  Take cyclobenzaprine as prescribed do not work or drive while taking this medication due to sedating effects  Take OTC Tylenol/ibuprofen, ice/heat application, and OTC topical ointment of your choosing i.e. Tiger balm, Voltaren gel, or Biofreeze  Recommend gentle range of motion and stretching exercises as tolerated and demonstrated  Strict ED precautions discussed with patient    Disability Information   Status: Released to Restricted Duty    From:  8/22/2023  Through: 9/12/2023 Restrictions are: Temporary   Physical Restrictions   Sitting:    Standing:    Stooping:    Bending:      Squatting:    Walking:    Climbing:    Pushing:      Pulling:    Other:    Reaching Above Shoulder (L):   Reaching Above Shoulder (R):       Reaching Below Shoulder (L):    Reaching Below Shoulder (R):      Not to exceed Weight Limits   Carrying(hrs):   Weight Limit(lb): < or = to 25 pounds Lifting(hrs):   Weight  Limit(lb): < or = to 25 pounds   Comments: Avoid lifting, pushing, pulling, or carrying more than 25 pounds    Repetitive Actions   Hands: i.e. Fine Manipulations from Grasping:     Feet: i.e. Operating Foot Controls:     Driving / Operate Machinery:     Health Care Provider’s Original or Electronic Signature  COLLEEN Mark Health Care Provider’s Original or Electronic Signature    Vazquez Pickett DO MPH     Clinic Name / Location: [address] [place] [address] Clinic Phone Number: Dept: [phone]   Appointment Time: 11:15 Am Visit Start Time: 11:12 AM   Check-In Time:  11:07 Am Visit Discharge Time:  11:36 AM   Original-Treating  Physician or Chiropractor    Page 2-Insurer/TPA    Page 3-Employer    Page 4-Employee       none

## 2023-10-28 NOTE — PATIENT PROFILE ADULT - NSPROALCOHOLUSE2_GEN_A_NUR
PAST MEDICAL HISTORY:  Atrial fibrillation     Elevated cholesterol     HTN (hypertension)     Hyperlipidemia     
never

## 2023-11-16 NOTE — PROGRESS NOTE ADULT - SUBJECTIVE AND OBJECTIVE BOX
Pre-procedure teaching reinforced. INTERVAL HPI/OVERNIGHT EVENTS: my feet hurting otherwise better.   Vital Signs Last 24 Hrs  T(C): 36.7 (04 Jan 2019 12:11), Max: 37.1 (04 Jan 2019 04:33)  T(F): 98 (04 Jan 2019 12:11), Max: 98.8 (04 Jan 2019 04:33)  HR: 110 (04 Jan 2019 12:11) (104 - 110)  BP: 133/91 (04 Jan 2019 12:11) (108/77 - 133/91)  BP(mean): --  RR: 18 (04 Jan 2019 12:11) (18 - 19)  SpO2: 100% (04 Jan 2019 12:11) (97% - 100%)  I&O's Summary    03 Jan 2019 07:01  -  04 Jan 2019 07:00  --------------------------------------------------------  IN: 1176 mL / OUT: 4800 mL / NET: -3624 mL    04 Jan 2019 07:01  -  04 Jan 2019 14:25  --------------------------------------------------------  IN: 0 mL / OUT: 1650 mL / NET: -1650 mL      MEDICATIONS  (STANDING):  aspirin enteric coated 81 milliGRAM(s) Oral daily  buMETAnide Infusion 1 mG/Hr (5 mL/Hr) IV Continuous <Continuous>  calcitriol   Capsule 0.5 MICROGram(s) Oral daily  calcium acetate 667 milliGRAM(s) Oral three times a day with meals  calcium carbonate 1250 mG  + Vitamin D (OsCal 500 + D) 1 Tablet(s) Oral three times a day  chlorhexidine 4% Liquid 1 Application(s) Topical <User Schedule>  dextrose 5%. 1000 milliLiter(s) (50 mL/Hr) IV Continuous <Continuous>  dextrose 50% Injectable 25 Gram(s) IV Push once  gabapentin 200 milliGRAM(s) Oral three times a day  hydrALAZINE 10 milliGRAM(s) Oral every 8 hours  insulin lispro (HumaLOG) corrective regimen sliding scale   SubCutaneous three times a day before meals  insulin lispro (HumaLOG) corrective regimen sliding scale   SubCutaneous at bedtime  levothyroxine 175 MICROGram(s) Oral daily  magnesium oxide 400 milliGRAM(s) Oral two times a day with meals  metolazone 5 milliGRAM(s) Oral daily  milrinone Infusion 0.5 MICROgram(s)/kG/Min (14.76 mL/Hr) IV Continuous <Continuous>  pantoprazole    Tablet 40 milliGRAM(s) Oral before breakfast  potassium chloride    Tablet ER 40 milliEquivalent(s) Oral daily  potassium chloride    Tablet ER 40 milliEquivalent(s) Oral every 4 hours  rifaximin 550 milliGRAM(s) Oral two times a day  spironolactone 50 milliGRAM(s) Oral daily    MEDICATIONS  (PRN):  acetaminophen   Tablet .. 650 milliGRAM(s) Oral every 6 hours PRN Mild Pain (1 - 3)  dextrose 40% Gel 15 Gram(s) Oral once PRN Blood Glucose LESS THAN 70 milliGRAM(s)/deciliter  glucagon  Injectable 1 milliGRAM(s) IntraMuscular once PRN Glucose LESS THAN 70 milligrams/deciliter  guaiFENesin   Syrup  (Sugar-Free) 200 milliGRAM(s) Oral every 6 hours PRN Cough    LABS:                        9.6    7.64  )-----------( 284      ( 04 Jan 2019 08:17 )             29.8     01-04    129<L>  |  85<L>  |  25<H>  ----------------------------<  116<H>  3.2<L>   |  25  |  1.08    Ca    7.1<L>      04 Jan 2019 06:40  Mg     1.7     01-04    TPro  7.8  /  Alb  3.7  /  TBili  4.3<H>  /  DBili  x   /  AST  39  /  ALT  27  /  AlkPhos  321<H>  01-04        CAPILLARY BLOOD GLUCOSE      POCT Blood Glucose.: 200 mg/dL (04 Jan 2019 12:08)  POCT Blood Glucose.: 147 mg/dL (04 Jan 2019 08:00)  POCT Blood Glucose.: 156 mg/dL (03 Jan 2019 22:01)  POCT Blood Glucose.: 173 mg/dL (03 Jan 2019 16:43)          REVIEW OF SYSTEMS:  CONSTITUTIONAL: No fever, weight loss, or fatigue  EYES: No eye pain, visual disturbances, or discharge  RESPIRATORY: No cough, wheezing, chills or hemoptysis; No shortness of breath  CARDIOVASCULAR: No chest pain, palpitations, dizziness, or leg swelling  GASTROINTESTINAL: No abdominal or epigastric pain. No nausea, vomiting, or hematemesis; No diarrhea or constipation. No melena or hematochezia.  GENITOURINARY: No dysuria, frequency, hematuria, or incontinence  NEUROLOGICAL: No headaches, memory loss, loss of strength, numbness, or tremors      Consultant(s) Notes Reviewed:  [x ] YES  [ ] NO    PHYSICAL EXAM:  GENERAL: NAD, well-groomed, well-developed,not in any distress ,  HEAD:  Atraumatic, Normocephalic  EYES: EOMI, PERRLA, conjunctiva and sclera clear  ENMT: No tonsillar erythema, exudates, or enlargement; Moist mucous membranes, Good dentition, No lesions  NECK: JVD+++  NERVOUS SYSTEM:  Alert & Oriented X3, No focal deficit   CHEST/LUNG: Good air entry bilateral with no  rales, rhonchi, wheezing, or rubs  HEART: Regular rate and rhythm; No murmurs, rubs, or gallops  ABDOMEN: Soft, Nontender, less distended; Bowel sounds present  EXTREMITIES:  2+ edema     Care Discussed with Consultants/Other Providers [ x] YES  [ ] NO

## 2024-01-01 NOTE — PROGRESS NOTE ADULT - SUBJECTIVE AND OBJECTIVE BOX
Subjective   Ebony Sanchez is a 4 days female who presents today for a well child visit. Concerns: small heart murmer, jaundice levels   Birth History    Birth     Length: 49.5 cm     Weight: 3.39 kg     HC 37 cm    Apgar     One: 9     Five: 9    Discharge Weight: 3.309 kg    Delivery Method: , Low Transverse    Gestation Age: 37 5/7 wks    Days in Hospital: 3.0    Hospital Name: [facility] Location: [place], [place]     The following portions of the patient's history were reviewed by a provider in this encounter and updated as appropriate:       Well Child Assessment:  History was provided by the mother and father. Ebony lives with her mother and father.   Nutrition  Types of milk consumed include breast feeding and formula. Breast Feeding - Feedings occur every 1-3 hours. The patient feeds from both sides. 11-15 minutes are spent on the right breast. 11-15 minutes are spent on the left breast. Formula - Formula type: Similac 360 total care. Formula consumed per feeding (oz): 25 ml. Feedings occur every 1-3 hours. Feeding problems do not include vomiting.   Elimination  Urination occurs 1-3 times per 24 hours. Bowel movements occur 1-3 times per 24 hours. Stools have a seedy consistency. Elimination problems do not include constipation or diarrhea.   Sleep  The patient sleeps in her bassinet (parents room). Sleep positions include supine.   Safety  Home is child-proofed? partially. There is an appropriate car seat in use.   Screening  Immunizations are up-to-date.  screens normal: pending.   Social  The caregiver enjoys the child. Childcare is provided at child's home. The childcare provider is a parent.   Review of Systems   Constitutional:  Negative for activity change, appetite change, fever and irritability.   HENT:  Negative for congestion, drooling and rhinorrhea.    Eyes:  Negative for discharge and redness.   Respiratory:  Negative for cough, wheezing and  INTERVAL HPI/OVERNIGHT EVENTS:  No new overnight event.  No N/V/D.  Tolerating diet.     MEDICATIONS  (STANDING):  aspirin  chewable 81 milliGRAM(s) Oral daily  Biotene Dry Mouth Oral Rinse 5 milliLiter(s) Swish and Spit two times a day  buMETAnide 3 milliGRAM(s) Oral two times a day  calcitriol   Capsule 0.5 MICROGram(s) Oral daily  calcium acetate 2001 milliGRAM(s) Oral three times a day with meals  calcium carbonate 1250 mG  + Vitamin D (OsCal 500 + D) 2 Tablet(s) Oral three times a day  gabapentin 200 milliGRAM(s) Oral three times a day  influenza   Vaccine 0.5 milliLiter(s) IntraMuscular once  insulin lispro (HumaLOG) corrective regimen sliding scale   SubCutaneous three times a day before meals  insulin lispro (HumaLOG) corrective regimen sliding scale   SubCutaneous at bedtime  levothyroxine 175 MICROGram(s) Oral daily  milrinone Infusion 0.375 MICROgram(s)/kG/Min (10.89 mL/Hr) IV Continuous <Continuous>  pantoprazole    Tablet 40 milliGRAM(s) Oral before breakfast  rifaximin 550 milliGRAM(s) Oral two times a day  spironolactone 50 milliGRAM(s) Oral two times a day    MEDICATIONS  (PRN):  acetaminophen   Tablet .. 975 milliGRAM(s) Oral every 4 hours PRN Mild Pain (1 - 3)  benzonatate 100 milliGRAM(s) Oral three times a day PRN Cough  guaiFENesin   Syrup  (Sugar-Free) 100 milliGRAM(s) Oral every 6 hours PRN Cough      Allergies    Isordil (Headache)  penicillin (Rash)    Intolerances        Review of Systems:    General:  No wt loss, fevers, chills, night sweats,fatigue,   Eyes:  Good vision, no reported pain  ENT:  No sore throat, pain, runny nose, dysphagia  CV:  No pain, palpitatioins, hypo/hypertension  Resp:  No dyspnea, cough, tachypnea, wheezing  GI:  No pain, No nausea, No vomiting, No diarrhea, No constipatiion, No weight loss, No fever, No pruritis, No rectal bleeding, No tarry stools, No dysphagia,  :  No pain, bleeding, incontinence, nocturia  Muscle:  No pain, weakness  Neuro:  No weakness, tingling, memory problems  Psych:  No fatigue, insomnia, mood problems, depression  Endocrine:  No polyuria, polydypsia, cold/heat intolerance  Heme:  No petechiae, ecchymosis, easy bruisability  Skin:  No rash, tattoos, scars, edema      Vital Signs Last 24 Hrs  T(C): 36.2 (22 Nov 2018 12:19), Max: 36.7 (21 Nov 2018 21:59)  T(F): 97.2 (22 Nov 2018 12:19), Max: 98 (21 Nov 2018 21:59)  HR: 101 (22 Nov 2018 12:19) (98 - 109)  BP: 102/67 (22 Nov 2018 12:19) (102/67 - 113/83)  BP(mean): --  RR: 18 (22 Nov 2018 12:19) (18 - 18)  SpO2: 95% (22 Nov 2018 12:19) (95% - 98%)    PHYSICAL EXAM:    Constitutional: NAD, well-developed  HEENT: EOMI, throat clear  Neck: No LAD, supple  Respiratory: CTA and P  Cardiovascular: S1 and S2, RRR, no M  Gastrointestinal: BS+, soft, NT/ND, neg HSM,  Extremities: No peripheral edema, neg clubing, cyanosis  Vascular: 2+ peripheral pulses  Neurological: A/O x 3, no focal deficits  Psychiatric: Normal mood, normal affect  Skin: No rashes      LABS:                        11.1   6.0   )-----------( 334      ( 22 Nov 2018 07:02 )             34.7     11-22    133<L>  |  87<L>  |  18  ----------------------------<  100<H>  3.1<L>   |  30  |  0.84    Ca    8.5      22 Nov 2018 07:06  Mg     1.4     11-22    TPro  7.2  /  Alb  3.1<L>  /  TBili  5.1<H>  /  DBili  x   /  AST  43<H>  /  ALT  31  /  AlkPhos  305<H>  11-21          RADIOLOGY & ADDITIONAL TESTS: stridor.    Cardiovascular:  Negative for fatigue with feeds and cyanosis.   Gastrointestinal:  Negative for abdominal distention, constipation, diarrhea and vomiting.   Genitourinary:  Negative for decreased urine volume and hematuria.   Musculoskeletal:  Negative for extremity weakness.   Skin:  Negative for rash.   Allergic/Immunologic: Negative for food allergies and immunocompromised state.   Neurological:  Negative for facial asymmetry.   Hematological:  Negative for adenopathy.         Objective Pulse 152   Temp 37.1 °C (98.7 °F)   Resp 40   Ht 48.3 cm   Wt 3.246 kg   HC 36.2 cm   BMI 13.94 kg/m²     Growth parameters are noted and are appropriate for age.  Physical Exam  Constitutional:       General: She is not in acute distress.     Appearance: Normal appearance.   HENT:      Head: Normocephalic. Anterior fontanelle is flat.      Right Ear: Tympanic membrane and ear canal normal.      Left Ear: Tympanic membrane and ear canal normal.      Nose: Nose normal.      Mouth/Throat:      Pharynx: Oropharynx is clear.   Eyes:      General: Red reflex is present bilaterally.      Conjunctiva/sclera: Conjunctivae normal.      Pupils: Pupils are equal, round, and reactive to light.   Cardiovascular:      Rate and Rhythm: Normal rate and regular rhythm.      Heart sounds: No murmur heard.  Pulmonary:      Effort: Pulmonary effort is normal.      Breath sounds: Normal breath sounds.   Abdominal:      General: Abdomen is flat. Bowel sounds are normal.      Palpations: Abdomen is soft.   Genitourinary:     General: Normal vulva.      Labia: No labial fusion.       Rectum: Normal.   Musculoskeletal:         General: Normal range of motion.      Cervical back: Normal range of motion and neck supple.   Skin:     General: Skin is warm and dry.      Capillary Refill: Capillary refill takes less than 2 seconds.      Findings: No rash.      Comments: Mild jaundice   Neurological:      General: No focal deficit present.     "  Mental Status: She is alert.         Assessment/Plan   Healthy 4 days female with normal weight loss, normal development  Mild jaundice, will monitor-follow up if worsening, poor feeding, lethargy  No murmur appreciated today  Monitored latch in office, still with difficulties at breast-refer to lactation for extra support  Weight check 1 week  1. Anticipatory guidance discussed.  Specific topics reviewed: adequate diet for breastfeeding, call for jaundice, decreased feeding, or fever, impossible to \"spoil\" infants at this age, limit daytime sleep to 3-4 hours at a time, normal crying, place in crib before completely asleep, safe sleep furniture, sleep face up to decrease chances of SIDS, typical  feeding habits, and umbilical cord stump care.  2. Screening tests:   a. State  metabolic screen:  pending  b. Hearing screen (OAE, ABR): negative  3. Ultrasound of the hips to screen for developmental dysplasia of the hip: not applicable  4. Risk factors for tuberculosis:  negative  5. Immunizations today: per orders.  History of previous adverse reactions to immunizations? no  6. Follow-up visit in 1 month for next well child visit, or sooner as needed.  "

## 2024-04-17 NOTE — ED ADULT TRIAGE NOTE - MODE OF ARRIVAL
Germania Crawley presents with a mass on her left arm for the past three months. She reports no pain, itching, drainage, or injury. Medications and allergies reviewed. She reports no nicotine use. She is in 7th grade.    Private Vehicle

## 2024-05-06 NOTE — H&P ADULT - BACK EXAM
48-year-old female past medical history of gastritis, colitis,  8 years ago, prediabetes, presenting for acute on chronic lower abdominal pain, worsening for the past 3 days. Pain is described as diffuse, radiating into her back on both sides, associated with nausea, vomiting, diarrhea, subjective fever.  Patient reports 5 episodes of vomiting in the past 2 days and 7 or 8 episodes of diarrhea per day.   Denies any bloody or dark stool. Patient reports pain started about a year ago typically lasts for 3 or 4 days before improving spontaneously. Patient reports she has previously been seen by her OB/GYN for this pain, and had a negative ultrasound in January. Patient also reports dysuria, denies any vaginal discharge or itching.  Finished her last period 4 days ago and notes it was heavier than usual.  Denies having seen a gastroenterologist for the symptoms or having had a colonoscopy in the past. 48-year-old female past medical history of gastritis, colitis,  8 years ago, prediabetes, presenting for acute on chronic lower abdominal pain, worsening for the past 3 days. Pain is described as diffuse, radiating into her back on both sides, associated with nausea, vomiting, diarrhea, subjective fever.  Patient reports 5 episodes of vomiting in the past 2 days and 7 or 8 episodes of diarrhea per day.   Denies any bloody or dark stool. Patient reports pain started about a year ago typically lasts for 3 or 4 days before improving spontaneously. Patient reports she has previously been seen by her OB/GYN for this pain, and had a negative ultrasound in January. Patient also reports dysuria, denies any vaginal discharge or itching.  Finished her last period 4 days ago and notes it was heavier than usual, denies history of STI.  Denies having seen a gastroenterologist for the symptoms or having had a colonoscopy in the past.    Discussion via Language Line  Service (Persian) #[id number] normal/normal shape/ROM intact

## 2024-06-17 NOTE — PROGRESS NOTE ADULT - SUBJECTIVE AND OBJECTIVE BOX
pt seen and examined, no complaints, ROS - .       benzocaine 15 mG/menthol 3.6 mG Lozenge 1 Lozenge Oral every 4 hours PRN  buMETAnide 2 milliGRAM(s) Oral daily  calcitriol   Capsule 0.5 MICROGram(s) Oral daily  calcium carbonate 1250 mG  + Vitamin D (OsCal 500 + D) 1 Tablet(s) Oral two times a day  cefTRIAXone   IVPB      cefTRIAXone   IVPB 2 Gram(s) IV Intermittent every 24 hours  chlorhexidine 4% Liquid 1 Application(s) Topical <User Schedule>  dextrose 40% Gel 15 Gram(s) Oral once PRN  dextrose 5%. 1000 milliLiter(s) IV Continuous <Continuous>  dextrose 50% Injectable 12.5 Gram(s) IV Push once  dextrose 50% Injectable 25 Gram(s) IV Push once  dextrose 50% Injectable 25 Gram(s) IV Push once  gabapentin 100 milliGRAM(s) Oral every 12 hours  glucagon  Injectable 1 milliGRAM(s) IntraMuscular once PRN  insulin lispro (HumaLOG) corrective regimen sliding scale   SubCutaneous three times a day before meals  insulin lispro (HumaLOG) corrective regimen sliding scale   SubCutaneous at bedtime  magnesium oxide 400 milliGRAM(s) Oral three times a day with meals  milrinone Infusion 0.5 MICROgram(s)/kG/Min IV Continuous <Continuous>  rifaximin 550 milliGRAM(s) Oral two times a day                            9.0    7.77  )-----------( 278      ( 25 Mar 2019 22:29 )             28.8       Hemoglobin: 9.0 g/dL (03-25 @ 22:29)  Hemoglobin: 8.8 g/dL (03-24 @ 08:58)  Hemoglobin: 9.4 g/dL (03-23 @ 19:28)  Hemoglobin: 9.1 g/dL (03-23 @ 09:49)  Hemoglobin: 9.7 g/dL (03-22 @ 00:39)      03-25    128<L>  |  86<L>  |  16  ----------------------------<  203<H>  3.4<L>   |  23  |  0.77    Ca    5.8<LL>      25 Mar 2019 19:49  Phos  4.2     03-25  Mg     1.7     03-25    TPro  7.2  /  Alb  3.1<L>  /  TBili  3.2<H>  /  DBili  x   /  AST  46<H>  /  ALT  29  /  AlkPhos  382<H>  03-25    Creatinine Trend: 0.77<--, 0.71<--, 0.76<--, 0.74<--, 0.85<--, 0.82<--    COAGS:           T(C): 37.1 (03-26-19 @ 04:55), Max: 37.2 (03-25-19 @ 20:02)  HR: 112 (03-26-19 @ 04:55) (105 - 112)  BP: 102/75 (03-26-19 @ 04:55) (90/64 - 103/73)  RR: 16 (03-26-19 @ 04:55) (16 - 16)  SpO2: 100% (03-26-19 @ 04:55) (99% - 100%)  Wt(kg): --    I&O's Summary    24 Mar 2019 07:01  -  25 Mar 2019 07:00  --------------------------------------------------------  IN: 984 mL / OUT: 2800 mL / NET: -1816 mL    25 Mar 2019 07:01  -  26 Mar 2019 05:00  --------------------------------------------------------  IN: 998 mL / OUT: 1600 mL / NET: -602 mL      	  Lymphatic: No lymphadenopathy trace edema in LE bilaterally   Cardiovascular: Normal S1 S2, + JVD, No murmurs , Peripheral pulses palpable 2+ bilaterally  Respiratory: Lungs clear to auscultation  Gastrointestinal:  Soft, Non-tender, + BS	  Skin: No rashes, No ecchymoses, No cyanosis, warm to touch  Psychiatry:  Mood & affect appropriate      TELEMETRY: Afib       ECG: < from: 12 Lead ECG (03.20.19 @ 05:58) >  WIDE QRS TACHYCARDIA  LEFT AXIS DEVIATION  RIGHT BUNDLE BRANCH BLOCK  LEFT VENTRICULAR HYPERTROPHY WITH REPOLARIZATION ABNORMALITY  INFERIOR INFARCT , AGE UNDETERMINED  ABNORMAL ECG    < end of copied text >    	  RADIOLOGY:  OTHER:     DIAGNOSTIC TESTING:  [ ] Echocardiogram: < from: TTE with Doppler (w/Cont) (11.07.18 @ 05:53) >  Conclusions:  1. Tethered mitral valve leaflets with normal opening.  Mitral annular calcification and thickened  mitral leaflet  tips Moderate mitral regurgitation.  2. Calcified trileaflet aortic valve with normal opening.  3. Moderately dilated left atrium.  LA volume index = 43  cc/m2.  4. Eccentric left ventricular hypertrophy (dilated left  ventricle with normal relative wall thickness).  5. Severe global left ventricularsystolic dysfunction.  Endocardial visualization enhanced with intravenous  injection of Ultrasonic Enhancing Agent (Definity). No LV  thrombus.  Septal flattening consistent with right  ventricular overload.  6. Severe diastolic dysfunction with elevated filling  pressures  7. Severe right atrial enlargement.  8. Right ventricular enlargement with decreased right  ventricular systolic function.  A device wire is noted in  the right heart.  9. Dilated tricuspid annulus with malcoaptation of  tricuspid leaflets. Severe tricuspid regurgitation.  *** No previous Echo exam.    < end of copied text >    [ ]  Catheterization:  [ ] Stress Test:    	         ASSESSMENT/PLAN:  62 yo F with history of severe NICM s/p ICD on home milrinone, AF on ac with eliquis, HTN, DM, thyroid CA s/p thyroidectomy who is being seen for heart failure and cardiomyopathy.    ABX per ID, follow up on cultures   Cont to hold A/C and antiplts meds  at present , would need to restart once cleared by Neuro sx    GI / DVT prophylaxis,  keep K>4, mag >2.0   keep net neg with Bumex   cont inotropic support with Primacor , for depressed EF   Neurosx following for SDH   EPS follow up , +/- watchman if indicated   D/W Dr Grimes pt seen and examined, no complaints, ROS - .       benzocaine 15 mG/menthol 3.6 mG Lozenge 1 Lozenge Oral every 4 hours PRN  buMETAnide 2 milliGRAM(s) Oral daily  calcitriol   Capsule 0.5 MICROGram(s) Oral daily  calcium carbonate 1250 mG  + Vitamin D (OsCal 500 + D) 1 Tablet(s) Oral two times a day  cefTRIAXone   IVPB      cefTRIAXone   IVPB 2 Gram(s) IV Intermittent every 24 hours  chlorhexidine 4% Liquid 1 Application(s) Topical <User Schedule>  dextrose 40% Gel 15 Gram(s) Oral once PRN  dextrose 5%. 1000 milliLiter(s) IV Continuous <Continuous>  dextrose 50% Injectable 12.5 Gram(s) IV Push once  dextrose 50% Injectable 25 Gram(s) IV Push once  dextrose 50% Injectable 25 Gram(s) IV Push once  gabapentin 100 milliGRAM(s) Oral every 12 hours  glucagon  Injectable 1 milliGRAM(s) IntraMuscular once PRN  insulin lispro (HumaLOG) corrective regimen sliding scale   SubCutaneous three times a day before meals  insulin lispro (HumaLOG) corrective regimen sliding scale   SubCutaneous at bedtime  magnesium oxide 400 milliGRAM(s) Oral three times a day with meals  milrinone Infusion 0.5 MICROgram(s)/kG/Min IV Continuous <Continuous>  rifaximin 550 milliGRAM(s) Oral two times a day                            9.0    7.77  )-----------( 278      ( 25 Mar 2019 22:29 )             28.8       Hemoglobin: 9.0 g/dL (03-25 @ 22:29)  Hemoglobin: 8.8 g/dL (03-24 @ 08:58)  Hemoglobin: 9.4 g/dL (03-23 @ 19:28)  Hemoglobin: 9.1 g/dL (03-23 @ 09:49)  Hemoglobin: 9.7 g/dL (03-22 @ 00:39)      03-25    128<L>  |  86<L>  |  16  ----------------------------<  203<H>  3.4<L>   |  23  |  0.77    Ca    5.8<LL>      25 Mar 2019 19:49  Phos  4.2     03-25  Mg     1.7     03-25    TPro  7.2  /  Alb  3.1<L>  /  TBili  3.2<H>  /  DBili  x   /  AST  46<H>  /  ALT  29  /  AlkPhos  382<H>  03-25    Creatinine Trend: 0.77<--, 0.71<--, 0.76<--, 0.74<--, 0.85<--, 0.82<--    COAGS:           T(C): 37.1 (03-26-19 @ 04:55), Max: 37.2 (03-25-19 @ 20:02)  HR: 112 (03-26-19 @ 04:55) (105 - 112)  BP: 102/75 (03-26-19 @ 04:55) (90/64 - 103/73)  RR: 16 (03-26-19 @ 04:55) (16 - 16)  SpO2: 100% (03-26-19 @ 04:55) (99% - 100%)  Wt(kg): --    I&O's Summary    24 Mar 2019 07:01  -  25 Mar 2019 07:00  --------------------------------------------------------  IN: 984 mL / OUT: 2800 mL / NET: -1816 mL    25 Mar 2019 07:01  -  26 Mar 2019 05:00  --------------------------------------------------------  IN: 998 mL / OUT: 1600 mL / NET: -602 mL      	  Lymphatic: No lymphadenopathy trace edema in LE bilaterally   Cardiovascular: Normal S1 S2, + JVD, No murmurs , Peripheral pulses palpable 2+ bilaterally  Respiratory: Lungs clear to auscultation  Gastrointestinal:  Soft, Non-tender, + BS	  Skin: No rashes, No ecchymoses, No cyanosis, warm to touch  Psychiatry:  Mood & affect appropriate      TELEMETRY: Afib       ECG: < from: 12 Lead ECG (03.20.19 @ 05:58) >  WIDE QRS TACHYCARDIA  LEFT AXIS DEVIATION  RIGHT BUNDLE BRANCH BLOCK  LEFT VENTRICULAR HYPERTROPHY WITH REPOLARIZATION ABNORMALITY  INFERIOR INFARCT , AGE UNDETERMINED  ABNORMAL ECG    < end of copied text >    	  RADIOLOGY:  OTHER:     DIAGNOSTIC TESTING:  [ ] Echocardiogram: < from: TTE with Doppler (w/Cont) (11.07.18 @ 05:53) >  Conclusions:  1. Tethered mitral valve leaflets with normal opening.  Mitral annular calcification and thickened  mitral leaflet  tips Moderate mitral regurgitation.  2. Calcified trileaflet aortic valve with normal opening.  3. Moderately dilated left atrium.  LA volume index = 43  cc/m2.  4. Eccentric left ventricular hypertrophy (dilated left  ventricle with normal relative wall thickness).  5. Severe global left ventricularsystolic dysfunction.  Endocardial visualization enhanced with intravenous  injection of Ultrasonic Enhancing Agent (Definity). No LV  thrombus.  Septal flattening consistent with right  ventricular overload.  6. Severe diastolic dysfunction with elevated filling  pressures  7. Severe right atrial enlargement.  8. Right ventricular enlargement with decreased right  ventricular systolic function.  A device wire is noted in  the right heart.  9. Dilated tricuspid annulus with malcoaptation of  tricuspid leaflets. Severe tricuspid regurgitation.  *** No previous Echo exam.    < end of copied text >    [ ]  Catheterization:  [ ] Stress Test:    	         ASSESSMENT/PLAN:  62 yo F with history of severe NICM s/p ICD on home milrinone, AF on ac with eliquis, HTN, DM, thyroid CA s/p thyroidectomy who is being seen for heart failure and cardiomyopathy.    ABX per ID, follow up on cultures   Cont to hold A/C and antiplts meds  at present , would need to restart once cleared by Neuro sx    GI / DVT prophylaxis,  keep K>4, mag >2.0   keep net neg with Bumex   cont inotropic support with Primacor , for depressed EF   Neurosx following for SDH Simple: Patient demonstrates quick and easy understanding

## 2024-08-02 NOTE — PHYSICAL THERAPY INITIAL EVALUATION ADULT - LEVEL OF CONSCIOUSNESS, REHAB EVAL
Sunscreen Recommendation Label Override: Broad Spectrum Sunscreen SPF 30+
Detail Level: Simple
Detail Level: Detailed
alert

## 2024-08-22 NOTE — PROGRESS NOTE ADULT - PROVIDER SPECIALTY LIST ADULT
Detail Level: Detailed Internal Medicine Quality 110: Preventive Care And Screening: Influenza Immunization: Influenza Immunization Administered during Influenza season Quality 47: Advance Care Plan: Advance Care Planning discussed and documented in the medical record; patient did not wish or was not able to name a surrogate decision maker or provide an advance care plan. Quality 226: Preventive Care And Screening: Tobacco Use: Screening And Cessation Intervention: Patient screened for tobacco use and is an ex/non-smoker Quality 111:Pneumonia Vaccination Status For Older Adults: Patient received any pneumococcal conjugate or polysaccharide vaccine on or after their 60th birthday and before the end of the measurement period Quality 130: Documentation Of Current Medications In The Medical Record: Current Medications Documented

## 2024-09-03 NOTE — H&P ADULT - PROBLEM/PLAN-6
[No Acute Distress] : no acute distress [No Resp Distress] : no resp distress DISPLAY PLAN FREE TEXT

## 2025-01-17 NOTE — CHART NOTE - NSCHARTNOTEFT_GEN_A_CORE
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 Actively drinking. Now p/w abdominal pain/distension with worsening of liver tests possibly triggered by infection. DDx also includes alcohol associated hepatitis given hx of alc hep 7/2024 which improved with steroids  - OLT: send workup to activate evaluation, complex given active drinking. Will likely need colonoscopy (unknown last c-scope)  - Ascites: large ascites on exam, requiring frequent LVPs         - Please perform paracentesis today (send cell count w/ diff, culture, albumin, total protein, and cytology)  - EV: EGD 7/2024 for melena showed grade II EV, small gastric ulcer (neg HP), and portal gastropathy. Takes nadolol at home. Holding BB for now given hypotension  - HE: c/w lactulose and rifaximin, goal 3-4 BM/day  - HCC screening: last CT w/ contrast 7/2024 no lesion seen. Due for repeat         - Obtain MRI abdomen with and without contrast and MRCP for HCC screening (liver protocol) and evaluate portal veins  - C/w home midodrine 10 mg tid  - C/w PPI for stress ulcer ppx and hx of ulcer  - Trend MELD labs daily (cmp, INR) daily    #Complex Right Pleural Effusion  #Leukocytosis  CT chest noncontrast reviewed from OSH showing moderate sized complex R sided pleural effusion. No thoracentesis done per records  - Obtain CT chest noncontrast to evaluate pleural effusion  - F/u BCx and UCx for infectious workup  - C/w empiric zosyn for now  - Trend cbc and fever curve    #Acute Renal Failure  Found to have acute renal failure requiring dialysis at OSH, ddx includes HRS vs ATN. Permacath in place. Cr baseline 1.12 10/2024  - Transplant nephrology consult  - HD per nephrology  - Trend CMP daily    #Anemia  #Thrombocytopenia  Normocytic anemia with Hgb 8.5 and plt count 64 likely due to chronic liver dz  - Trend cbc daily  - Transfuse Hgb>7, plt>10 (>20 if septic, >50 if bleeding)  - Hold DVT ppx for now given recent bleeding. If no further bleeding, likely can restart in 24-48 hrs    All recommendations are tentative until note is attested by attending.     Judy Tucker, PGY4  Gastroenterology/Hepatology Fellow  Available on Microsoft Teams  [phone] (Long Range Pager)  [phone] (Short Range Pager [facility])    After 5 pm, please contact the on-call GI fellow for any urgent issues via the Hospital Call         62 woman w/ hx of HTN, HLD, chronic RBBB,  thyroid CA s/p thyroidectomy, nonischemic cardiomyopathy with an ejection fraction of 11% s/p CRT-D, multiple admission for ADHF, 2/2 med non-compliance.  Admitted to CCU for ionotropic assisted diuretic therapy w/ good response.  Euvolemic at the moment.    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    Impression:  #Decompensated EtOH Associated Cirrhosis  #Hx of alcohol associated hepatitis 7/2024  MELD 3 score 34 on 1/17  Hx of decompensated cirrhosis c/b recurrent ascites requiring LVPs, grade II EV, and HE. Actively drinking. Now p/w abdominal pain/distension with worsening of liver tests possibly triggered by infection. DDx also includes alcohol associated hepatitis given hx of alc hep 7/2024 which improved with steroids  - OLT: send workup to activate evaluation, complex given active drinking. Will likely need colonoscopy (unknown last c-scope). Cardiology consult  - Ascites: large ascites on exam, requiring frequent LVPs         - Please perform paracentesis today (send cell count w/ diff, culture, albumin, total protein, and cytology)  - EV: EGD 7/2024 for melena showed grade II EV, small gastric ulcer (neg HP), and portal gastropathy. Takes nadolol at home. Holding BB for now given hypotension  - HE: c/w lactulose and rifaximin, goal 3-4 BM/day  - HCC screening: last CT w/ contrast 7/2024 no lesion seen. Due for repeat         - Obtain MRI abdomen with and without contrast and MRCP for HCC screening (liver protocol) and evaluate portal veins  - C/w home midodrine 10 mg tid  - C/w PPI for stress ulcer ppx and hx of ulcer  - Trend MELD labs daily (cmp, INR) daily    #Complex Right Pleural Effusion  #Leukocytosis  CT chest noncontrast reviewed from OSH showing moderate sized complex R sided pleural effusion. No thoracentesis done per records  - Obtain CT chest noncontrast to evaluate pleural effusion  - F/u BCx and UCx for infectious workup  - C/w empiric zosyn for now  - Trend cbc and fever curve    #Acute Renal Failure  Found to have acute renal failure requiring dialysis at OSH, ddx includes HRS vs ATN. Permacath in place. Cr baseline 1.12 10/2024  - Transplant nephrology consult  - HD per nephrology  - Trend CMP daily    #Anemia  #Thrombocytopenia  Normocytic anemia with Hgb 8.5 and plt count 64 likely due to chronic liver dz  - Trend cbc daily  - Transfuse Hgb>7, plt>10 (>20 if septic, >50 if bleeding)  - Hold DVT ppx for now given recent bleeding. If no further bleeding, likely can restart in 24-48 hrs    All recommendations are tentative until note is attested by attending.     Judy Tucker, PGY4  Gastroenterology/Hepatology Fellow  Available on Microsoft Teams  [phone] (Long Range Pager)  [phone] (Short Range Pager [facility])    After 5 pm, please contact the on-call GI fellow for any urgent issues via the Hospital Call

## 2025-01-24 NOTE — PHYSICAL THERAPY INITIAL EVALUATION ADULT - PLANNED THERAPY INTERVENTIONS, PT EVAL
Patient arrives to the ER with complaints of weakness that started yesterday. Lives at home alone. Also reports feeling shaky and having back pain. Denies fevers.   
GOAL: Pt will negotiate 9 steps with unilateral handrail and straight cane in a step over step pattern independently in 2 weeks/gait training

## 2025-02-05 NOTE — H&P ADULT. - TOBACCO USE
[facility]  EMERGENCY DEPARTMENT ENCOUNTER      Pt Name: Claudine Nicole  MRN: [id number]  Birthdate 1939  Date of evaluation: 2/4/2025  Provider: Danilo Concepcion MD    CHIEF COMPLAINT       Chief Complaint   Patient presents with    Dizziness         HISTORY OF PRESENT ILLNESS   (Location/Symptom, Timing/Onset, Context/Setting, Quality, Duration, Modifying Factors, Severity)  Note limiting factors.   Pt feeling light headed for past 2 weeks.  Symptoms worse when she gets up to walk.  She would get up for meals then return to room.  She was told her blood pressure was low.  No CP or SOB.      The history is provided by the patient and medical records.         Review of External Medical Records:     Nursing Notes were reviewed.    REVIEW OF SYSTEMS    (2-9 systems for level 4, 10 or more for level 5)     Review of Systems    Except as noted above the remainder of the review of systems was reviewed and negative.       PAST MEDICAL HISTORY   No past medical history on file.      SURGICAL HISTORY     No past surgical history on file.      CURRENT MEDICATIONS       Previous Medications    ACETAMINOPHEN (TYLENOL) 500 MG TABLET    Take 1 tablet by mouth every 4 hours as needed for Pain    AMIODARONE (CORDARONE) 200 MG TABLET    Take 1 tablet by mouth daily    BREO ELLIPTA 100-25 MCG/ACT INHALER    Inhale 1 puff into the lungs daily    CETIRIZINE (ZYRTEC) 5 MG TABLET    Take 1 tablet by mouth daily    FERROUS SULFATE (IRON 325) 325 (65 FE) MG TABLET    Take 1 tablet by mouth daily (with breakfast)    FLUOXETINE (PROZAC) 20 MG CAPSULE    Take 1 capsule by mouth daily    FUROSEMIDE (LASIX) 20 MG TABLET    Take 1 tablet by mouth daily    IPRATROPIUM 0.5 MG-ALBUTEROL 2.5 MG (DUONEB) 0.5-2.5 (3) MG/3ML SOLN NEBULIZER SOLUTION    Take 3 mLs by nebulization in the morning and 3 mLs at noon and 3 mLs in the evening.    MELATONIN 3 MG TABS TABLET    Take 2 tablets by mouth nightly    MEMANTINE 
Never smoker

## 2025-05-15 NOTE — ED ADULT TRIAGE NOTE - CCCP TRG CHIEF CMPLNT
Addended by: YNES BEVERLY on: 5/15/2025 09:43 AM     Modules accepted: Orders     shortness of breath/nausea/vomiting

## 2025-06-03 NOTE — ED ADULT NURSE NOTE - NS ED NOTE ABUSE RESPONSE YN
[No Acute Distress] : no acute distress [Well Nourished] : well nourished [Well Developed] : well developed [Well-Appearing] : well-appearing [Normal Sclera/Conjunctiva] : normal sclera/conjunctiva [PERRL] : pupils equal round and reactive to light [EOMI] : extraocular movements intact [Normal Outer Ear/Nose] : the outer ears and nose were normal in appearance [Normal Oropharynx] : the oropharynx was normal [No JVD] : no jugular venous distention [No Lymphadenopathy] : no lymphadenopathy [Supple] : supple [Thyroid Normal, No Nodules] : the thyroid was normal and there were no nodules present [No Respiratory Distress] : no respiratory distress  [No Accessory Muscle Use] : no accessory muscle use [Clear to Auscultation] : lungs were clear to auscultation bilaterally Yes [Normal Rate] : normal rate  [Regular Rhythm] : with a regular rhythm [Normal S1, S2] : normal S1 and S2 [No Murmur] : no murmur heard [No Carotid Bruits] : no carotid bruits [No Abdominal Bruit] : a ~M bruit was not heard ~T in the abdomen [No Varicosities] : no varicosities [Pedal Pulses Present] : the pedal pulses are present [No Edema] : there was no peripheral edema [No Palpable Aorta] : no palpable aorta [No Extremity Clubbing/Cyanosis] : no extremity clubbing/cyanosis [Soft] : abdomen soft [Non Tender] : non-tender [Non-distended] : non-distended [No Masses] : no abdominal mass palpated [No HSM] : no HSM [Normal Bowel Sounds] : normal bowel sounds [Normal Posterior Cervical Nodes] : no posterior cervical lymphadenopathy [Normal Anterior Cervical Nodes] : no anterior cervical lymphadenopathy [No CVA Tenderness] : no CVA  tenderness [No Spinal Tenderness] : no spinal tenderness [No Joint Swelling] : no joint swelling [Grossly Normal Strength/Tone] : grossly normal strength/tone [No Rash] : no rash [Coordination Grossly Intact] : coordination grossly intact [No Focal Deficits] : no focal deficits [Normal Gait] : normal gait [Deep Tendon Reflexes (DTR)] : deep tendon reflexes were 2+ and symmetric [Normal Affect] : the affect was normal [Normal Insight/Judgement] : insight and judgment were intact

## 2025-08-06 NOTE — PROGRESS NOTE ADULT - PROBLEM/PLAN-4
C3 nurse spoke with Caro Martell  for a TCC post hospital discharge follow up call. The patient has a scheduled [facility] appointment with Katherine Hernandez NP  on 08/07/25 @ [id number].       
DISPLAY PLAN FREE TEXT